# Patient Record
Sex: FEMALE | Race: WHITE | NOT HISPANIC OR LATINO | Employment: OTHER | URBAN - METROPOLITAN AREA
[De-identification: names, ages, dates, MRNs, and addresses within clinical notes are randomized per-mention and may not be internally consistent; named-entity substitution may affect disease eponyms.]

---

## 2017-01-04 ENCOUNTER — ONCOLOGY VISIT (OUTPATIENT)
Dept: ONCOLOGY | Facility: CLINIC | Age: 72
End: 2017-01-04
Attending: INTERNAL MEDICINE
Payer: COMMERCIAL

## 2017-01-04 ENCOUNTER — INFUSION THERAPY VISIT (OUTPATIENT)
Dept: INFUSION THERAPY | Facility: CLINIC | Age: 72
End: 2017-01-04
Attending: INTERNAL MEDICINE
Payer: COMMERCIAL

## 2017-01-04 ENCOUNTER — HOSPITAL ENCOUNTER (OUTPATIENT)
Facility: CLINIC | Age: 72
Setting detail: SPECIMEN
Discharge: HOME | End: 2017-01-04
Attending: INTERNAL MEDICINE | Admitting: INTERNAL MEDICINE
Payer: COMMERCIAL

## 2017-01-04 VITALS
TEMPERATURE: 98.2 F | RESPIRATION RATE: 17 BRPM | HEART RATE: 117 BPM | WEIGHT: 137.8 LBS | DIASTOLIC BLOOD PRESSURE: 76 MMHG | BODY MASS INDEX: 26.08 KG/M2 | SYSTOLIC BLOOD PRESSURE: 108 MMHG | OXYGEN SATURATION: 100 %

## 2017-01-04 DIAGNOSIS — D61.818 PANCYTOPENIA (H): ICD-10-CM

## 2017-01-04 DIAGNOSIS — D61.818 PANCYTOPENIA (H): Primary | ICD-10-CM

## 2017-01-04 LAB
ABO + RH BLD: NORMAL
BASOPHILS # BLD AUTO: 0 10E9/L (ref 0–0.2)
BASOPHILS NFR BLD AUTO: 0 %
BLD GP AB SCN SERPL QL: NORMAL
BLD GP AB SCN SERPL QL: NORMAL
BLD PROD TYP BPU: NORMAL
BLOOD BANK CMNT PATIENT-IMP: NORMAL
BLOOD BANK CMNT PATIENT-IMP: NORMAL
CELL TRANSPLANT TYPE: NORMAL
DIFFERENTIAL METHOD BLD: ABNORMAL
EOSINOPHIL # BLD AUTO: 0 10E9/L (ref 0–0.7)
EOSINOPHIL NFR BLD AUTO: 1.4 %
ERYTHROCYTE [DISTWIDTH] IN BLOOD BY AUTOMATED COUNT: 19 % (ref 10–15)
HCT VFR BLD AUTO: 25.7 % (ref 35–47)
HGB BLD-MCNC: 8.7 G/DL (ref 11.7–15.7)
IMM GRANULOCYTES # BLD: 0 10E9/L (ref 0–0.4)
IMM GRANULOCYTES NFR BLD: 0 %
LYMPHOCYTES # BLD AUTO: 1.8 10E9/L (ref 0.8–5.3)
LYMPHOCYTES NFR BLD AUTO: 83.3 %
MCH RBC QN AUTO: 31.4 PG (ref 26.5–33)
MCHC RBC AUTO-ENTMCNC: 33.9 G/DL (ref 31.5–36.5)
MCV RBC AUTO: 93 FL (ref 78–100)
MONOCYTES # BLD AUTO: 0.1 10E9/L (ref 0–1.3)
MONOCYTES NFR BLD AUTO: 3.3 %
NEUTROPHILS # BLD AUTO: 0.3 10E9/L (ref 1.6–8.3)
NEUTROPHILS NFR BLD AUTO: 12 %
NRBC # BLD AUTO: 0 10*3/UL
NRBC BLD AUTO-RTO: 0 /100
NUM BPU REQUESTED: 1
PLATELET # BLD AUTO: 6 10E9/L (ref 150–450)
RBC # BLD AUTO: 2.77 10E12/L (ref 3.8–5.2)
SPECIMEN EXP DATE BLD: NORMAL
SPECIMEN EXP DATE BLD: NORMAL
WBC # BLD AUTO: 2.1 10E9/L (ref 4–11)

## 2017-01-04 PROCEDURE — 86901 BLOOD TYPING SEROLOGIC RH(D): CPT | Performed by: INTERNAL MEDICINE

## 2017-01-04 PROCEDURE — 86850 RBC ANTIBODY SCREEN: CPT | Performed by: INTERNAL MEDICINE

## 2017-01-04 PROCEDURE — 85025 COMPLETE CBC W/AUTO DIFF WBC: CPT | Performed by: INTERNAL MEDICINE

## 2017-01-04 PROCEDURE — 36415 COLL VENOUS BLD VENIPUNCTURE: CPT

## 2017-01-04 PROCEDURE — 86900 BLOOD TYPING SEROLOGIC ABO: CPT | Performed by: INTERNAL MEDICINE

## 2017-01-04 PROCEDURE — 99214 OFFICE O/P EST MOD 30 MIN: CPT | Performed by: INTERNAL MEDICINE

## 2017-01-04 PROCEDURE — 99211 OFF/OP EST MAY X REQ PHY/QHP: CPT

## 2017-01-04 ASSESSMENT — PAIN SCALES - GENERAL: PAINLEVEL: NO PAIN (0)

## 2017-01-04 NOTE — PROGRESS NOTES
[facility] PHYSICIANS  HEMATOLOGY ONCOLOGY    HEMATOLOGY FOLLOWUP NOTE      DIAGNOSES:  Pancytopenia with moderate neutropenia, macrocytic anemia and severe thrombocytopenia.  S/P bone marrow biopsy 9/26/16 5-10% cellularity, no significant evidence of myelodysplasia. Normal flow-cytometry. Cytogenetics normal. Small clone of Red cells with CD 59 deficiency on Flow-cytometry. Working diagnosis is severe aplastic anemia vs MDS.  Repeat bone marrow biopsy 11/21/16 showed similar findings.      TREATMENT: patient to start  ATG, cyclosporine and eltrombopag     SUBJECTIVE:  The patient was seen as a followup today. She continues to have fatigued. No fever. No bleeding complications.     REVIEW OF SYSTEMS:  A complete review of systems was performed and found to be negative other than pertinent positives mentioned in history of present illness.     Past medical, social histories reviewed.    Meds- Reviewed.     PHYSICAL EXAMINATION:   VITAL SIGNS: /76 mmHg  Pulse 117  Temp(Src) 98.2  F (36.8  C) (Oral)  Resp 17  Wt 62.506 kg (137 lb 12.8 oz)  SpO2 100%  GENERAL: Sitting comfortably.   HEENT: Pupils are equal. Oropharynx is clear.   NECK: No cervical or supraclavicular lymphadenopathy.   LUNGS: Clear bilaterally.   HEART: S1, S2, regular.   ABDOMEN: Soft, nontender, nondistended, no hepatosplenomegaly.   EXTREMITIES: Warm, well perfused.   NEUROLOGIC: Alert, awake.   SKIN: No rash.   LYMPHATICS: No edema.     LABORATORY DATA:   Recent Labs   Lab Test  09/14/16   1345  09/08/16   0914   NA  140  141   POTASSIUM  3.8  4.2   CHLORIDE  108  110*   CO2  26  23   ANIONGAP  6  8   BUN  23  24   CR  0.95  0.91   GLC  132*  100*   LEO  8.7  9.1     Recent Labs   Lab Test  11/21/16   0945  11/16/16   1314  11/02/16   0845   WBC  1.9*  2.8*  2.5*   HGB  7.8*  6.9*  6.7*   PLT  9*  10*  18*   MCV  99  102*  112*   NEUTROPHIL  16.1  16.2  17.0     Recent Labs   Lab Test  09/14/16   1345  02/20/14   1049   01/30/12   1032   BILITOTAL  0.4  0.5  0.4   ALKPHOS  86  61  78   ALT  28  28  33   AST  17  27  32   ALBUMIN  3.6  3.9  4.2   LDH  195   --    --      ECOG PS: 1    ASSESSMENT:  Bonny Raymundo is a 70-year-old lady who presented with new onset dyspnea.  She has previous history of recurrent provoked venous thromboembolism and initial workup ruled out a venous thromboembolism.  Blood work was consistent with pancytopenia and she was seen by me on 09/14/2016 for initial evaluation.  Initial work up showed normal creatinine, normal LFTs, elevated erythropoietin, elevated ferritin, low iron of iron deficiency, normal B12 level.  She had a negative Salvador test, normal haptoglobin, serum protein electrophoresis was normal as well without any evidence of monoclonal gammopathy.  Abdominal ultrasound was also performed and there was no evidence of hepatosplenomegaly.   On 09/08/2016 she had a CT scan of the chest which did not show any evidence of pulmonary embolism.  There was a right-sided lung nodule which will need a followup CT scan.   PNH testing showed a small number of red cells which were CD-59 deficient.   She has seen Dr. Rafita Rogel at [facility].   parvovirus, CMV antibody, EBV antibody, aresenic level unremarkable. Repeat bone marrow biopsy 11/21/16 is consistent with severe AA..  - Transfusion parameters- Hb of 7 and platelet of 10K. Use leukocyte reduced irradiated products. Labs every two weeks.    She saw Dr. Rogel 12/8 and a discussion around ATG, cyclosporine and eltrombopag. I encouraged her to consider treatment, she agrees. She will follow up with [facility].     PLAN:   1. CBC diff every weeks- transfuse to keep Hb of 7 and platelet of 10K. Use leukocyte reduced irradiated products.   2- See Dr. Rogel 1/5  3- Continue prophylactic antibiotic    ANTONIA DHILLON MD    1/4/2017    cc: David Hicks NP

## 2017-01-04 NOTE — PROGRESS NOTES
"Bonny Raymundo is a 71 year old female who presents for:  Chief Complaint   Patient presents with     Oncology Clinic Visit     Ret Pancytopenia        Initial Vitals:  /76 mmHg  Pulse 117  Temp(Src) 98.2  F (36.8  C) (Oral)  Resp 17  Wt 62.506 kg (137 lb 12.8 oz)  SpO2 100% Estimated body mass index is 26.08 kg/(m^2) as calculated from the following:    Height as of 12/29/16: 1.548 m (5' 0.95\").    Weight as of this encounter: 62.506 kg (137 lb 12.8 oz).. There is no height on file to calculate BSA. BP completed using cuff size: regular  No Pain (0) No LMP recorded. Patient has had a hysterectomy. Allergies and medications reviewed.     Medications: Medication refills not needed today.  Pharmacy name entered into [employer]: [address]    Comments: None    6 minutes for nursing intake (face to face time)   Jennifer Garsia CMA    DISCHARGE PLAN:  Next appointments: See patient instruction section  Departure Mode: Ambulatory  Accompanied by: Self  4 minutes for nursing discharge (face to face time)   Jennie Monahan RN    1. CBC diff every week- transfuse to keep Hb of 7 and platelet of 10K. Use leukocyte reduced irradiated products. --encouraged Pt to schedule her wkly labs/poss trsfn appts but Pt wanted to wait until after her  appt.  2- See Dr. Rogel 1/5  3- Continue prophylactic antibiotic      1/5/2017 Thu  10:00 A(Ar*) 240 [facility] [[id number]] [facility] CHAIR 6 [[id number]] LEVEL 4 [666] transfuse 1 unit platelets       1/5/2017 Thu  2:00 PM  2:00 P(Ar*) 30 UCBMT [[id number]] RYANN ROGEL [[id number]] [facility] ONC RETURN [[id number]] APLASTIC ANEMIA/ALEJO REF pt request shelton scheduled in phone rm         "

## 2017-01-04 NOTE — MR AVS SNAPSHOT
After Visit Summary   1/4/2017    Bonny Raymundo    MRN: [id number]           Patient Information     Date Of Birth          1945        Visit Information        Provider Department      1/4/2017 2:45 PM Manpreet Mcdaniel MD [facility]        Care Instructions    1. CBC diff every week- transfuse to keep Hb of 7 and platelet of 10K. Use leukocyte reduced irradiated products.   2- See Dr. Rogel 1/5  3- Continue prophylactic antibiotic        Follow-ups after your visit        Your next 10 appointments already scheduled     Jan 05, 2017 10:00 AM   Level 4 with SOUTH CHAIR 6   [facility] ([facility])    [facility]  [address]  [facility] [id number] [phone]            Jan 05, 2017  2:00 PM   RETURN ONC with Rafita Rogel MD   [facility] Blood [facility] Marrow Transplant ([facility])    [address] [facility]  [address] [facility] [address] [phone]              Who to contact     If you have questions or need follow up information about today's clinic visit or your schedule please contact [facility] directly at [phone].  Normal or non-critical lab and imaging results will be communicated to you by [employer], letter or phone within 4 business days after the clinic has received the results. If you do not hear from us within 7 days, please contact the clinic through [employer] or phone. If you have a critical or abnormal lab result, we will notify you by phone as soon as possible.  Submit refill requests through [employer] or call your pharmacy and they will forward the refill request to us. Please allow 3 business days for your refill to be completed.          Additional Information About Your Visit        [employer] Information     [employer] lets you send messages to your doctor, view your test results, renew your prescriptions, schedule appointments and more.  "To sign up, go to [email or web address] . Click on \"Log in\" on the left side of the screen, which will take you to the Welcome page. Then click on \"Sign up Now\" on the right side of the page.     You will be asked to enter the access code listed below, as well as some personal information. Please follow the directions to create your username and password.     Your access code is: [email or web address]  Expires: 3/15/2017 11:47 AM     Your access code will  in 90 days. If you need help or a new code, please call your [facility] clinic or [phone].        Care EveryWhere ID     This is your Care EveryWhere ID. This could be used by other organizations to access your [facility] medical records  [id number]        Your Vitals Were     Pulse Temperature Respirations Pulse Oximetry          117 98.2  F (36.8  C) (Oral) 17 100%         Blood Pressure from Last 3 Encounters:   17 108/76   16 141/76   16 122/78    Weight from Last 3 Encounters:   17 62.506 kg (137 lb 12.8 oz)   16 64.003 kg (141 lb 1.6 oz)   16 61.508 kg (135 lb 9.6 oz)              Today, you had the following     No orders found for display       Primary Care Provider Office Phone # Fax #    Shivani Phelps PA-C [phone]       [facility] [address] [address]        Thank you!     Thank you for choosing [facility]  for your care. Our goal is always to provide you with excellent care. Hearing back from our patients is one way we can continue to improve our services. Please take a few minutes to complete the written survey that you may receive in the mail after your visit with us. Thank you!             Your Updated Medication List - Protect others around you: Learn how to safely use, store and throw away your medicines at www.disposemymeds.org.          This list is accurate as of: 17  3:27 PM.  Always use your most recent med list.                   Brand " Name Dispense Instructions for use    BENADRYL ALLERGY PO      Take 25 mg by mouth At Bedtime       calcium-vitamin D 600-400 MG-UNIT per tablet    CALTRATE     Take 1 tablet by mouth daily.       chlorpheniramine 4 MG tablet    CHLOR-TRIMETON     Take 4 mg by mouth every 6 hours as needed. For head cold       COMPRESSION STOCKINGS     2 each    Wear compression stockings at 20-30 mmHg rating most time during the day to the affected leg (left leg) or both legs. Take them off at night.       diclofenac 1 % Gel topical gel    VOLTAREN    100 g    Apply 2 g topically 2 times daily       fluconazole 200 MG tablet    DIFLUCAN    30 tablet    Take 1 tablet (200 mg) by mouth daily       levofloxacin 500 MG tablet    LEVAQUIN    30 tablet    Take 1 tablet (500 mg) by mouth daily       loratadine 10 MG tablet    CLARITIN     Take 10 mg by mouth daily as needed. For head cold       MULTIVITAMIN TABS   OR      1 tablet daily       order for DME     1 Box    Equipment being ordered: knee high compression stockings- 18-20 mm       TYLENOL PO      Take 325 mg by mouth every 6 hours as needed for mild pain or fever

## 2017-01-04 NOTE — PROGRESS NOTES
Medical Assistant Note:    Bonny Raymundo presents today for labs.    Patient seen by provider today: Yes: Jonas  Concerns: No Concerns    Labs drawn:  See orders   Peripheral RAC 21 gauge, green butterfly needle type. Gauze&coban applied.    Post Assessment:  Labs drawn without difficulty: Yes.    Discharge Plan:  Face to Face time: 8min.      Estefani Shankar MA

## 2017-01-04 NOTE — PATIENT INSTRUCTIONS
1. CBC diff every week- transfuse to keep Hb of 7 and platelet of 10K. Use leukocyte reduced irradiated products.   2- See Dr. Rogel 1/5  3- Continue prophylactic antibiotic

## 2017-01-05 ENCOUNTER — OFFICE VISIT (OUTPATIENT)
Dept: TRANSPLANT | Facility: CLINIC | Age: 72
End: 2017-01-05
Attending: INTERNAL MEDICINE
Payer: COMMERCIAL

## 2017-01-05 ENCOUNTER — INFUSION THERAPY VISIT (OUTPATIENT)
Dept: INFUSION THERAPY | Facility: CLINIC | Age: 72
End: 2017-01-05
Attending: INTERNAL MEDICINE
Payer: COMMERCIAL

## 2017-01-05 ENCOUNTER — HOSPITAL ENCOUNTER (OUTPATIENT)
Facility: CLINIC | Age: 72
Setting detail: SPECIMEN
End: 2017-01-05
Attending: INTERNAL MEDICINE

## 2017-01-05 VITALS
RESPIRATION RATE: 16 BRPM | SYSTOLIC BLOOD PRESSURE: 131 MMHG | DIASTOLIC BLOOD PRESSURE: 65 MMHG | HEART RATE: 75 BPM | TEMPERATURE: 97.9 F

## 2017-01-05 DIAGNOSIS — D61.818 PANCYTOPENIA (H): ICD-10-CM

## 2017-01-05 DIAGNOSIS — D61.818 PANCYTOPENIA (H): Primary | ICD-10-CM

## 2017-01-05 LAB
BLD PROD TYP BPU: NORMAL
BLD UNIT ID BPU: 0
BLOOD PRODUCT CODE: NORMAL
BPU ID: NORMAL
TRANSFUSION STATUS PATIENT QL: NORMAL
TRANSFUSION STATUS PATIENT QL: NORMAL

## 2017-01-05 PROCEDURE — P9037 PLATE PHERES LEUKOREDU IRRAD: HCPCS | Performed by: INTERNAL MEDICINE

## 2017-01-05 PROCEDURE — 99212 OFFICE O/P EST SF 10 MIN: CPT | Mod: 25

## 2017-01-05 PROCEDURE — 36430 TRANSFUSION BLD/BLD COMPNT: CPT

## 2017-01-05 PROCEDURE — 99212 OFFICE O/P EST SF 10 MIN: CPT | Mod: ZF

## 2017-01-05 RX ORDER — LIDOCAINE 40 MG/G
CREAM TOPICAL
Status: DISCONTINUED | OUTPATIENT
Start: 2017-01-05 | End: 2017-01-05 | Stop reason: HOSPADM

## 2017-01-05 RX ORDER — HEPARIN SODIUM,PORCINE 10 UNIT/ML
2-5 VIAL (ML) INTRAVENOUS
Status: DISCONTINUED | OUTPATIENT
Start: 2017-01-05 | End: 2017-01-05 | Stop reason: HOSPADM

## 2017-01-05 ASSESSMENT — PAIN SCALES - GENERAL: PAINLEVEL: NO PAIN (0)

## 2017-01-05 NOTE — PROGRESS NOTES
Infusion Nursing Note:  Bonny Raymundo presents today for 1 dose of Platelets.    Patient seen by provider today: No    Note: N/A.    Intravenous Access:  Peripheral IV placed.    Treatment Conditions:  HGB      8.7   1/4/2017  WBC      2.1   1/4/2017   ANEU      0.3   1/4/2017  PLT        6   1/4/2017     Results reviewed, labs MET treatment parameters, ok to proceed with treatment; Plts 6.  Blood consent signed 10/6/16.     Post Infusion Assessment:  Patient tolerated infusion without incident.  Blood return noted pre and post infusion.  Site patent and intact, free from redness, edema or discomfort.  No evidence of extravasations.  Access discontinued per protocol.    Discharge Plan:   AVS to patient via MYCHART.  Patient will return prn for next appointment.   Patient discharged in stable condition accompanied by: self.  Departure Mode: Ambulatory.    ANABELLE Jones RN (discharge)

## 2017-01-05 NOTE — NURSING NOTE
"Bonny Raymundo is a 71 year old female who presents for:  Chief Complaint   Patient presents with     Oncology Clinic Visit     Return: APLASTIC ANEMIA        Initial Vitals:  There were no vitals taken for this visit. Estimated body mass index is 26.08 kg/(m^2) as calculated from the following:    Height as of 12/29/16: 1.548 m (5' 0.95\").    Weight as of 1/4/17: 62.506 kg (137 lb 12.8 oz).. There is no height or weight on file to calculate BSA. BP completed using cuff size: regular  Data Unavailable No LMP recorded. Patient has had a hysterectomy. Allergies and medications reviewed.     Medications: Medication refills not needed today.  Pharmacy name entered into [facility]: [facility] [place] [address], [address]    Comments:     6 minutes for nursing intake (face to face time)   ANTHONY SPRINGER CMA          "

## 2017-01-05 NOTE — PROGRESS NOTES
Wt Readings from Last 4 Encounters:   01/04/17 62.506 kg (137 lb 12.8 oz)   12/29/16 64.003 kg (141 lb 1.6 oz)   12/08/16 61.508 kg (135 lb 9.6 oz)   11/30/16 62.143 kg (137 lb)     Vital Signs 1/5/2017 1/5/2017   Systolic 117 131   Diastolic 72 65   Pulse 72 75   Temperature     98 97.9   Respirations 16                               HISTORY OF PRESENT ILLNESS:  Bonny returns to follow up her severe aplastic anemia.  Over the last month, she has continued to have pancytopenia. Her neutrophils have been under 500 and platelets under 10,000 for most of the last 6 weeks. Her reticulocyte count is lower as well.  Her marrow in late November showed no new diagnostic or improved findings from the earlier one at diagnosis.    She has had no major infections or bleeding but has easy bruising and there is more cutaneous hemorrhage around her venapuncture sites.  She has had transfusions of red cells and platelets almost every week but thankfully has had no notable infections.  She has trace nosebleeds and petechiae but no other external bleeding.  She has some low back pain which has been treated by her primary care physician with topical diclofenac, which has helped a bit. She knows to avoid aspirin and systemic nonsteroidals.  She is eating reasonably well but has lost a bit of weight, even accounting for heavier clothes on this cold day.  There are no respiratory, cardiac, GI or  symptoms.  She is mildly constipated.      PHYSICAL EXAMINATION:   VITAL SIGNS:  Her exam shows acceptable vital signs.   LYMPH:  She has no peripheral lymphadenopathy.   EXTREMITIES:  She has no peripheral edema.   SKIN:  She has some petechiae in her lower extremities and her arms but they are not fresh.  She has a few slightly palpable bruises on her arms and her right lower shin laterally.   HEENT:  Her oropharynx is clear without mucosal lesions.  She has no conjunctival hemorrhage.   LUNGS:  Clear.   CARDIOVASCULAR:  Heart tones are  regular with a soft ejection murmur.   ABDOMEN:  Soft and nontender without hepatosplenomegaly or masses.      LABORATORY TESTING:  Laboratory testing  shows persisting pancytopenia. A recent INR was normal.     ASSESSMENT AND PLAN:  As we discussed on previous occasions, now her idiopathic aplastic anemia and pancytopenia is severe enough to be treated with ATG, cyclosporine, corticosteroids and eltrombopag.  I reviewed this plan with her last month and she agrees that it is time to get started.      She elects to be admitted on Monday 01/09.  Orders were placed for admission, placement of PICC line and then beginning the ATG therapy.      I reviewed the toxicities of ATG including fever, chills and transient increases in her red cell and platelet transfusion requirement, risks of infection, risk of bleeding.  We reviewed cyclosporine with the risks of hypertension, renal insufficiency, occasionally hyperglycemia, neurologic tremor and possibly even seizures or PRES.  We reviewed the hazards of corticosteroids and the other supportive care medicines accompany the ATG and the eltrombopag.  I told her that she would likely be hospitalized at least 5-7 days, but we will watch her closely and then she would need twice weekly minimum followup for blood counts, transfusion support and adjustment of her cyclosporine therapy afterwards.      If possible, we will arrange for outpatient care to be followed with Dr. Mcdaniel at [facility] as that is closer to her home, but we will accommodate what her needs are based on response to the ATG therapy.      Her questions were answered in full, and we will arrange admission for 01/09.      Rafita Rogel MD   Professor of Medicine     Extended; 50 minute visit.    Results for LISSETH PARIKH (MRN [id number]) as of 1/5/2017 15:00   Ref. Range 12/29/2016 08:22 12/29/2016 09:01 1/4/2017 15:00 1/4/2017 15:00 1/4/2017 20:43   Sodium Latest Ref Range: 133-144 mmol/L 143       Potassium  Latest Ref Range: 3.4-5.3 mmol/L 3.5       Chloride Latest Ref Range:  mmol/L 110 (H)       Carbon Dioxide Latest Ref Range: 20-32 mmol/L 24       Urea Nitrogen Latest Ref Range: 7-30 mg/dL 26       Creatinine Latest Ref Range: 0.52-1.04 mg/dL 0.88       GFR Estimate Latest Ref Range: >60 mL/min/1.7m2 63       GFR Estimate If Black Latest Ref Range: >60 mL/min/1.7m2 76       Calcium Latest Ref Range: 8.5-10.1 mg/dL 9.0       Anion Gap Latest Ref Range: 3-14 mmol/L 9       Albumin Latest Ref Range: 3.4-5.0 g/dL 3.4       Protein Total Latest Ref Range: 6.8-8.8 g/dL 7.1       Bilirubin Total Latest Ref Range: 0.2-1.3 mg/dL 0.5       Alkaline Phosphatase Latest Ref Range:  U/L 101       ALT Latest Ref Range: 0-50 U/L 21       AST Latest Ref Range: 0-45 U/L 19       Glucose Latest Ref Range: 70-99 mg/dL 111 (H)       WBC Latest Ref Range: 4.0-11.0 10e9/L 2.1 (L)  2.1 (L)     Hemoglobin Latest Ref Range: 11.7-15.7 g/dL 7.8 (L)  8.7 (L)     Hematocrit Latest Ref Range: 35.0-47.0 % 22.8 (L)  25.7 (L)     Platelet Count Latest Ref Range: 150-450 10e9/L 8 (LL)  6 (LL)     RBC Count Latest Ref Range: 3.8-5.2 10e12/L 2.45 (L)  2.77 (L)     MCV Latest Ref Range:  fl 93  93     MCH Latest Ref Range: 26.5-33.0 pg 31.8  31.4     MCHC Latest Ref Range: 31.5-36.5 g/dL 34.2  33.9     RDW Latest Ref Range: 10.0-15.0 % 21.8 (H)  19.0 (H)     Diff Method Unknown Automated Method  Automated Method     % Neutrophils Latest Units: % 10.7  12.0     % Lymphocytes Latest Units: % 86.3  83.3     % Monocytes Latest Units: % 1.5  3.3     % Eosinophils Latest Units: % 1.5  1.4     % Basophils Latest Units: % 0.0  0.0     % Immature Granulocytes Latest Units: % 0.0  0.0     Nucleated RBCs Latest Ref Range: 0 /100 0  0     Absolute Neutrophil Latest Ref Range: 1.6-8.3 10e9/L 0.2 (LL)  0.3 (LL)     Absolute Lymphocytes Latest Ref Range: 0.8-5.3 10e9/L 1.8  1.8     Absolute Monocytes Latest Ref Range: 0.0-1.3 10e9/L 0.0  0.1      Absolute Eosinophils Latest Ref Range: 0.0-0.7 10e9/L 0.0  0.0     Absolute Basophils Latest Ref Range: 0.0-0.2 10e9/L 0.0  0.0     Abs Immature Granulocytes Latest Ref Range: 0-0.4 10e9/L 0.0  0.0     Absolute Nucleated RBC Unknown 0.0  0.0     % Retic Latest Ref Range: 0.5-2.0 % 0.7       Absolute Retic Latest Ref Range: 25-95 10e9/L 15.9 (L)       INR Latest Ref Range: 0.86-1.14  1.06       ABO Unknown A  Blood Bank Cancel... A    RH(D) Unknown Pos  Blood Bank Cancel... Pos    Antibody Screen Unknown Neg  Canceled, Test cr... Neg    Test Valid Only At [facility]...  [facility]... [facility]...    Specimen Expires Unknown 01/01/2017 01/07/2017 01/07/2017    Ordered Component Type Unknown  PLT Pheresis   PLT Pheresis   Blood Component Type Unknown Red Blood Cells L... PlateletPheresis ...   PlateletPheresis,...   Unit Number Unknown [id number]   [id number]   Division Number Unknown 00 00   00   Status of Unit Unknown Released to care ... Released to care ...   Released to care ...   Cell Transplant Type Unknown    GROUP O CELLS DET...    Crossmatch Unknown Red Blood Cells       Unit Status Unknown ISS ISS   ISS

## 2017-01-05 NOTE — Clinical Note
1/5/2017      RE: Bonny Raymundo  [address]  [facility] [address]         Wt Readings from Last 4 Encounters:   01/04/17 62.506 kg (137 lb 12.8 oz)   12/29/16 64.003 kg (141 lb 1.6 oz)   12/08/16 61.508 kg (135 lb 9.6 oz)   11/30/16 62.143 kg (137 lb)     Vital Signs 1/5/2017 1/5/2017   Systolic 117 131   Diastolic 72 65   Pulse 72 75   Temperature     98 97.9   Respirations 16                               HISTORY OF PRESENT ILLNESS:  Bonny returns to follow up her severe aplastic anemia.  Over the last month, she has continued to have pancytopenia. Her neutrophils have been under 500 and platelets under 10,000 for most of the last 6 weeks. Her reticulocyte count is lower as well.  Her marrow in late November showed no new diagnostic or improved findings from the earlier one at diagnosis.    She has had no major infections or bleeding but has easy bruising and there is more cutaneous hemorrhage around her venapuncture sites.  She has had transfusions of red cells and platelets almost every week but thankfully has had no notable infections.  She has trace nosebleeds and petechiae but no other external bleeding.  She has some low back pain which has been treated by her primary care physician with topical diclofenac, which has helped a bit. She knows to avoid aspirin and systemic nonsteroidals.  She is eating reasonably well but has lost a bit of weight, even accounting for heavier clothes on this cold day.  There are no respiratory, cardiac, GI or  symptoms.  She is mildly constipated.      PHYSICAL EXAMINATION:   VITAL SIGNS:  Her exam shows acceptable vital signs.   LYMPH:  She has no peripheral lymphadenopathy.   EXTREMITIES:  She has no peripheral edema.   SKIN:  She has some petechiae in her lower extremities and her arms but they are not fresh.  She has a few slightly palpable bruises on her arms and her right lower shin laterally.   HEENT:  Her oropharynx is clear without mucosal lesions.  She  has no conjunctival hemorrhage.   LUNGS:  Clear.   CARDIOVASCULAR:  Heart tones are regular with a soft ejection murmur.   ABDOMEN:  Soft and nontender without hepatosplenomegaly or masses.      LABORATORY TESTING:  Laboratory testing  shows persisting pancytopenia. A recent INR was normal.     ASSESSMENT AND PLAN:  As we discussed on previous occasions, now her idiopathic aplastic anemia and pancytopenia is severe enough to be treated with ATG, cyclosporine, corticosteroids and eltrombopag.  I reviewed this plan with her last month and she agrees that it is time to get started.      She elects to be admitted on Monday 01/09.  Orders were placed for admission, placement of PICC line and then beginning the ATG therapy.      I reviewed the toxicities of ATG including fever, chills and transient increases in her red cell and platelet transfusion requirement, risks of infection, risk of bleeding.  We reviewed cyclosporine with the risks of hypertension, renal insufficiency, occasionally hyperglycemia, neurologic tremor and possibly even seizures or PRES.  We reviewed the hazards of corticosteroids and the other supportive care medicines accompany the ATG and the eltrombopag.  I told her that she would likely be hospitalized at least 5-7 days, but we will watch her closely and then she would need twice weekly minimum followup for blood counts, transfusion support and adjustment of her cyclosporine therapy afterwards.      If possible, we will arrange for outpatient care to be followed with Dr. Mcdaniel at [facility] as that is closer to her home, but we will accommodate what her needs are based on response to the ATG therapy.      Her questions were answered in full, and we will arrange admission for 01/09.      Rafita Rogel MD   Professor of Medicine     Extended; 50 minute visit.    Results for LISSETH PARIKH (MRN [id number]) as of 1/5/2017 15:00   Ref. Range 12/29/2016 08:22 12/29/2016 09:01 1/4/2017 15:00 1/4/2017  15:00 1/4/2017 20:43   Sodium Latest Ref Range: 133-144 mmol/L 143       Potassium Latest Ref Range: 3.4-5.3 mmol/L 3.5       Chloride Latest Ref Range:  mmol/L 110 (H)       Carbon Dioxide Latest Ref Range: 20-32 mmol/L 24       Urea Nitrogen Latest Ref Range: 7-30 mg/dL 26       Creatinine Latest Ref Range: 0.52-1.04 mg/dL 0.88       GFR Estimate Latest Ref Range: >60 mL/min/1.7m2 63       GFR Estimate If Black Latest Ref Range: >60 mL/min/1.7m2 76       Calcium Latest Ref Range: 8.5-10.1 mg/dL 9.0       Anion Gap Latest Ref Range: 3-14 mmol/L 9       Albumin Latest Ref Range: 3.4-5.0 g/dL 3.4       Protein Total Latest Ref Range: 6.8-8.8 g/dL 7.1       Bilirubin Total Latest Ref Range: 0.2-1.3 mg/dL 0.5       Alkaline Phosphatase Latest Ref Range:  U/L 101       ALT Latest Ref Range: 0-50 U/L 21       AST Latest Ref Range: 0-45 U/L 19       Glucose Latest Ref Range: 70-99 mg/dL 111 (H)       WBC Latest Ref Range: 4.0-11.0 10e9/L 2.1 (L)  2.1 (L)     Hemoglobin Latest Ref Range: 11.7-15.7 g/dL 7.8 (L)  8.7 (L)     Hematocrit Latest Ref Range: 35.0-47.0 % 22.8 (L)  25.7 (L)     Platelet Count Latest Ref Range: 150-450 10e9/L 8 (LL)  6 (LL)     RBC Count Latest Ref Range: 3.8-5.2 10e12/L 2.45 (L)  2.77 (L)     MCV Latest Ref Range:  fl 93  93     MCH Latest Ref Range: 26.5-33.0 pg 31.8  31.4     MCHC Latest Ref Range: 31.5-36.5 g/dL 34.2  33.9     RDW Latest Ref Range: 10.0-15.0 % 21.8 (H)  19.0 (H)     Diff Method Unknown Automated Method  Automated Method     % Neutrophils Latest Units: % 10.7  12.0     % Lymphocytes Latest Units: % 86.3  83.3     % Monocytes Latest Units: % 1.5  3.3     % Eosinophils Latest Units: % 1.5  1.4     % Basophils Latest Units: % 0.0  0.0     % Immature Granulocytes Latest Units: % 0.0  0.0     Nucleated RBCs Latest Ref Range: 0 /100 0  0     Absolute Neutrophil Latest Ref Range: 1.6-8.3 10e9/L 0.2 (LL)  0.3 (LL)     Absolute Lymphocytes Latest Ref Range: 0.8-5.3  10e9/L 1.8  1.8     Absolute Monocytes Latest Ref Range: 0.0-1.3 10e9/L 0.0  0.1     Absolute Eosinophils Latest Ref Range: 0.0-0.7 10e9/L 0.0  0.0     Absolute Basophils Latest Ref Range: 0.0-0.2 10e9/L 0.0  0.0     Abs Immature Granulocytes Latest Ref Range: 0-0.4 10e9/L 0.0  0.0     Absolute Nucleated RBC Unknown 0.0  0.0     % Retic Latest Ref Range: 0.5-2.0 % 0.7       Absolute Retic Latest Ref Range: 25-95 10e9/L 15.9 (L)       INR Latest Ref Range: 0.86-1.14  1.06       ABO Unknown A  Blood Bank Cancel... A    RH(D) Unknown Pos  Blood Bank Cancel... Pos    Antibody Screen Unknown Neg  Canceled, Test cr... Neg    Test Valid Only At Unknown [facility]...  [facility]... [facility]...    Specimen Expires Unknown 01/01/2017 01/07/2017 01/07/2017    Ordered Component Type Unknown  PLT Pheresis   PLT Pheresis   Blood Component Type Unknown Red Blood Cells L... PlateletPheresis ...   PlateletPheresis,...   Unit Number Unknown [id number]   [id number]   Division Number Unknown 00 00 00   Status of Unit Unknown Released to care ... Released to care ...   Released to care ...   Cell Transplant Type Unknown    GROUP O CELLS DET...    Crossmatch Unknown Red Blood Cells       Unit Status Unknown ISS ISS   ISS       Rafita Rogel MD

## 2017-01-06 DIAGNOSIS — D64.9 ANEMIA: Primary | ICD-10-CM

## 2017-01-09 ENCOUNTER — HOSPITAL ENCOUNTER (INPATIENT)
Facility: CLINIC | Age: 72
LOS: 4 days | Discharge: HOME HEALTH | DRG: 810 | End: 2017-01-13
Attending: INTERNAL MEDICINE | Admitting: INTERNAL MEDICINE
Payer: COMMERCIAL

## 2017-01-09 ENCOUNTER — APPOINTMENT (OUTPATIENT)
Dept: GENERAL RADIOLOGY | Facility: CLINIC | Age: 72
DRG: 810 | End: 2017-01-09
Attending: INTERNAL MEDICINE
Payer: COMMERCIAL

## 2017-01-09 ENCOUNTER — HOSPITAL ENCOUNTER (OUTPATIENT)
Dept: VASCULAR ULTRASOUND | Facility: CLINIC | Age: 72
DRG: 810 | End: 2017-01-09
Attending: INTERNAL MEDICINE
Payer: COMMERCIAL

## 2017-01-09 DIAGNOSIS — K21.9 GASTROESOPHAGEAL REFLUX DISEASE WITHOUT ESOPHAGITIS: ICD-10-CM

## 2017-01-09 DIAGNOSIS — D61.818 PANCYTOPENIA (H): ICD-10-CM

## 2017-01-09 DIAGNOSIS — I15.8 OTHER SECONDARY HYPERTENSION: ICD-10-CM

## 2017-01-09 DIAGNOSIS — D61.3 IDIOPATHIC APLASTIC ANEMIA (H): Primary | ICD-10-CM

## 2017-01-09 DIAGNOSIS — D64.9 ANEMIA: ICD-10-CM

## 2017-01-09 LAB
ALBUMIN SERPL-MCNC: 3.3 G/DL (ref 3.4–5)
ALBUMIN UR-MCNC: NEGATIVE MG/DL
ALP SERPL-CCNC: 122 U/L (ref 40–150)
ALT SERPL W P-5'-P-CCNC: 33 U/L (ref 0–50)
ANION GAP SERPL CALCULATED.3IONS-SCNC: 8 MMOL/L (ref 3–14)
APPEARANCE UR: CLEAR
AST SERPL W P-5'-P-CCNC: 18 U/L (ref 0–45)
BASOPHILS # BLD AUTO: 0 10E9/L (ref 0–0.2)
BASOPHILS NFR BLD AUTO: 0 %
BILIRUB SERPL-MCNC: 0.3 MG/DL (ref 0.2–1.3)
BILIRUB UR QL STRIP: NEGATIVE
BUN SERPL-MCNC: 29 MG/DL (ref 7–30)
CALCIUM SERPL-MCNC: 8.8 MG/DL (ref 8.5–10.1)
CHLORIDE SERPL-SCNC: 109 MMOL/L (ref 94–109)
CO2 SERPL-SCNC: 27 MMOL/L (ref 20–32)
COLOR UR AUTO: ABNORMAL
CREAT SERPL-MCNC: 0.95 MG/DL (ref 0.52–1.04)
DIFFERENTIAL METHOD BLD: ABNORMAL
EOSINOPHIL # BLD AUTO: 0 10E9/L (ref 0–0.7)
EOSINOPHIL NFR BLD AUTO: 0.4 %
ERYTHROCYTE [DISTWIDTH] IN BLOOD BY AUTOMATED COUNT: 19.3 % (ref 10–15)
GFR SERPL CREATININE-BSD FRML MDRD: 58 ML/MIN/1.7M2
GLUCOSE SERPL-MCNC: 109 MG/DL (ref 70–99)
GLUCOSE UR STRIP-MCNC: NEGATIVE MG/DL
HCT VFR BLD AUTO: 21.6 % (ref 35–47)
HGB BLD-MCNC: 7 G/DL (ref 11.7–15.7)
HGB UR QL STRIP: NEGATIVE
IMM GRANULOCYTES # BLD: 0 10E9/L (ref 0–0.4)
IMM GRANULOCYTES NFR BLD: 0 %
KETONES UR STRIP-MCNC: NEGATIVE MG/DL
LACTATE BLD-SCNC: 3.5 MMOL/L (ref 0.7–2.1)
LEUKOCYTE ESTERASE UR QL STRIP: NEGATIVE
LYMPHOCYTES # BLD AUTO: 2 10E9/L (ref 0.8–5.3)
LYMPHOCYTES NFR BLD AUTO: 86.7 %
MAGNESIUM SERPL-MCNC: 2.1 MG/DL (ref 1.6–2.3)
MCH RBC QN AUTO: 30.4 PG (ref 26.5–33)
MCHC RBC AUTO-ENTMCNC: 32.4 G/DL (ref 31.5–36.5)
MCV RBC AUTO: 94 FL (ref 78–100)
MONOCYTES # BLD AUTO: 0.1 10E9/L (ref 0–1.3)
MONOCYTES NFR BLD AUTO: 3 %
MUCOUS THREADS #/AREA URNS LPF: PRESENT /LPF
NEUTROPHILS # BLD AUTO: 0.2 10E9/L (ref 1.6–8.3)
NEUTROPHILS NFR BLD AUTO: 9.9 %
NITRATE UR QL: NEGATIVE
NRBC # BLD AUTO: 0 10*3/UL
NRBC BLD AUTO-RTO: 2 /100
PH UR STRIP: 5.5 PH (ref 5–7)
PHOSPHATE SERPL-MCNC: 3.6 MG/DL (ref 2.5–4.5)
PLATELET # BLD AUTO: 9 10E9/L (ref 150–450)
PLATELET # BLD EST: ABNORMAL 10*3/UL
POTASSIUM SERPL-SCNC: 3.8 MMOL/L (ref 3.4–5.3)
PROT SERPL-MCNC: 6.7 G/DL (ref 6.8–8.8)
RBC # BLD AUTO: 2.3 10E12/L (ref 3.8–5.2)
RBC #/AREA URNS AUTO: 0 /HPF (ref 0–2)
SODIUM SERPL-SCNC: 143 MMOL/L (ref 133–144)
SP GR UR STRIP: 1.01 (ref 1–1.03)
SQUAMOUS #/AREA URNS AUTO: <1 /HPF (ref 0–1)
URN SPEC COLLECT METH UR: ABNORMAL
UROBILINOGEN UR STRIP-MCNC: NORMAL MG/DL (ref 0–2)
WBC # BLD AUTO: 2.3 10E9/L (ref 4–11)
WBC #/AREA URNS AUTO: <1 /HPF (ref 0–2)

## 2017-01-09 PROCEDURE — 25000125 ZZHC RX 250: Performed by: INTERNAL MEDICINE

## 2017-01-09 PROCEDURE — 3E043WL INTRODUCTION OF IMMUNOSUPPRESSIVE INTO CENTRAL VEIN, PERCUTANEOUS: ICD-10-PCS | Performed by: INTERNAL MEDICINE

## 2017-01-09 PROCEDURE — 87040 BLOOD CULTURE FOR BACTERIA: CPT | Performed by: INTERNAL MEDICINE

## 2017-01-09 PROCEDURE — 36592 COLLECT BLOOD FROM PICC: CPT | Performed by: PHYSICIAN ASSISTANT

## 2017-01-09 PROCEDURE — 12000003 ZZH R&B CRITICAL UMMC

## 2017-01-09 PROCEDURE — 25000132 ZZH RX MED GY IP 250 OP 250 PS 637: Performed by: INTERNAL MEDICINE

## 2017-01-09 PROCEDURE — 25000128 H RX IP 250 OP 636: Performed by: INTERNAL MEDICINE

## 2017-01-09 PROCEDURE — 84100 ASSAY OF PHOSPHORUS: CPT | Performed by: PHYSICIAN ASSISTANT

## 2017-01-09 PROCEDURE — 83735 ASSAY OF MAGNESIUM: CPT | Performed by: PHYSICIAN ASSISTANT

## 2017-01-09 PROCEDURE — 36569 INSJ PICC 5 YR+ W/O IMAGING: CPT

## 2017-01-09 PROCEDURE — 81001 URINALYSIS AUTO W/SCOPE: CPT | Performed by: INTERNAL MEDICINE

## 2017-01-09 PROCEDURE — 80053 COMPREHEN METABOLIC PANEL: CPT | Performed by: PHYSICIAN ASSISTANT

## 2017-01-09 PROCEDURE — 83605 ASSAY OF LACTIC ACID: CPT | Performed by: INTERNAL MEDICINE

## 2017-01-09 PROCEDURE — 36592 COLLECT BLOOD FROM PICC: CPT | Performed by: INTERNAL MEDICINE

## 2017-01-09 PROCEDURE — 25000130 H RX MED GY IP 250 OP 259 PS 637: Performed by: INTERNAL MEDICINE

## 2017-01-09 PROCEDURE — 25000132 ZZH RX MED GY IP 250 OP 250 PS 637: Performed by: PHYSICIAN ASSISTANT

## 2017-01-09 PROCEDURE — 85025 COMPLETE CBC W/AUTO DIFF WBC: CPT | Performed by: PHYSICIAN ASSISTANT

## 2017-01-09 PROCEDURE — C1751 CATH, INF, PER/CENT/MIDLINE: HCPCS

## 2017-01-09 PROCEDURE — 71010 XR CHEST 1 VW: CPT

## 2017-01-09 PROCEDURE — 25000130 H RX MED GY IP 250 OP 259 PS 637: Performed by: PHYSICIAN ASSISTANT

## 2017-01-09 RX ORDER — PROCHLORPERAZINE MALEATE 5 MG
5 TABLET ORAL EVERY 6 HOURS PRN
Status: DISCONTINUED | OUTPATIENT
Start: 2017-01-09 | End: 2017-01-13 | Stop reason: HOSPADM

## 2017-01-09 RX ORDER — METHYLPREDNISOLONE SODIUM SUCCINATE 40 MG/ML
0.5 INJECTION, POWDER, LYOPHILIZED, FOR SOLUTION INTRAMUSCULAR; INTRAVENOUS EVERY 24 HOURS
Status: COMPLETED | OUTPATIENT
Start: 2017-01-10 | End: 2017-01-13

## 2017-01-09 RX ORDER — CYCLOSPORINE 100 MG/1
3 CAPSULE ORAL
Status: DISCONTINUED | OUTPATIENT
Start: 2017-01-09 | End: 2017-01-11

## 2017-01-09 RX ORDER — POTASSIUM CHLORIDE 29.8 MG/ML
20 INJECTION INTRAVENOUS
Status: DISCONTINUED | OUTPATIENT
Start: 2017-01-09 | End: 2017-01-13 | Stop reason: HOSPADM

## 2017-01-09 RX ORDER — ONDANSETRON 2 MG/ML
8 INJECTION INTRAMUSCULAR; INTRAVENOUS EVERY 8 HOURS PRN
Status: DISCONTINUED | OUTPATIENT
Start: 2017-01-09 | End: 2017-01-13 | Stop reason: HOSPADM

## 2017-01-09 RX ORDER — POTASSIUM CHLORIDE 1.5 G/1.58G
20-40 POWDER, FOR SOLUTION ORAL
Status: DISCONTINUED | OUTPATIENT
Start: 2017-01-09 | End: 2017-01-13 | Stop reason: HOSPADM

## 2017-01-09 RX ORDER — ONDANSETRON 8 MG/1
8 TABLET, ORALLY DISINTEGRATING ORAL EVERY 8 HOURS PRN
Status: DISCONTINUED | OUTPATIENT
Start: 2017-01-09 | End: 2017-01-13 | Stop reason: HOSPADM

## 2017-01-09 RX ORDER — ACETAMINOPHEN 325 MG/1
650 TABLET ORAL EVERY 24 HOURS
Status: COMPLETED | OUTPATIENT
Start: 2017-01-09 | End: 2017-01-12

## 2017-01-09 RX ORDER — ACETAMINOPHEN 325 MG/1
650 TABLET ORAL EVERY 4 HOURS PRN
Status: DISCONTINUED | OUTPATIENT
Start: 2017-01-09 | End: 2017-01-13 | Stop reason: HOSPADM

## 2017-01-09 RX ORDER — LORATADINE 10 MG/1
10 TABLET ORAL DAILY PRN
Status: DISCONTINUED | OUTPATIENT
Start: 2017-01-09 | End: 2017-01-13 | Stop reason: HOSPADM

## 2017-01-09 RX ORDER — POTASSIUM CHLORIDE 7.45 MG/ML
10 INJECTION INTRAVENOUS
Status: DISCONTINUED | OUTPATIENT
Start: 2017-01-09 | End: 2017-01-13 | Stop reason: HOSPADM

## 2017-01-09 RX ORDER — MAGNESIUM SULFATE HEPTAHYDRATE 40 MG/ML
4 INJECTION, SOLUTION INTRAVENOUS EVERY 4 HOURS PRN
Status: DISCONTINUED | OUTPATIENT
Start: 2017-01-09 | End: 2017-01-13 | Stop reason: HOSPADM

## 2017-01-09 RX ORDER — AMOXICILLIN 250 MG
1-2 CAPSULE ORAL 2 TIMES DAILY
Status: DISCONTINUED | OUTPATIENT
Start: 2017-01-09 | End: 2017-01-13 | Stop reason: HOSPADM

## 2017-01-09 RX ORDER — LORAZEPAM 2 MG/ML
.5-1 INJECTION INTRAMUSCULAR EVERY 6 HOURS PRN
Status: DISCONTINUED | OUTPATIENT
Start: 2017-01-09 | End: 2017-01-13 | Stop reason: HOSPADM

## 2017-01-09 RX ORDER — DIPHENHYDRAMINE HCL 25 MG
25 CAPSULE ORAL AT BEDTIME
Status: DISCONTINUED | OUTPATIENT
Start: 2017-01-09 | End: 2017-01-13 | Stop reason: HOSPADM

## 2017-01-09 RX ORDER — POTASSIUM CHLORIDE 750 MG/1
20-40 TABLET, EXTENDED RELEASE ORAL
Status: DISCONTINUED | OUTPATIENT
Start: 2017-01-09 | End: 2017-01-13 | Stop reason: HOSPADM

## 2017-01-09 RX ORDER — LIDOCAINE 40 MG/G
CREAM TOPICAL
Status: DISCONTINUED | OUTPATIENT
Start: 2017-01-09 | End: 2017-01-13 | Stop reason: HOSPADM

## 2017-01-09 RX ORDER — DIPHENHYDRAMINE HCL 25 MG
25 CAPSULE ORAL EVERY 24 HOURS
Status: COMPLETED | OUTPATIENT
Start: 2017-01-09 | End: 2017-01-12

## 2017-01-09 RX ORDER — LORAZEPAM 0.5 MG/1
.5-1 TABLET ORAL EVERY 6 HOURS PRN
Status: DISCONTINUED | OUTPATIENT
Start: 2017-01-09 | End: 2017-01-13 | Stop reason: HOSPADM

## 2017-01-09 RX ORDER — LEVOFLOXACIN 500 MG/1
500 TABLET, FILM COATED ORAL DAILY
Status: DISCONTINUED | OUTPATIENT
Start: 2017-01-09 | End: 2017-01-09

## 2017-01-09 RX ORDER — PANTOPRAZOLE SODIUM 40 MG/1
40 TABLET, DELAYED RELEASE ORAL EVERY MORNING
Status: DISCONTINUED | OUTPATIENT
Start: 2017-01-10 | End: 2017-01-13 | Stop reason: HOSPADM

## 2017-01-09 RX ORDER — POLYETHYLENE GLYCOL 3350 17 G/17G
17 POWDER, FOR SOLUTION ORAL DAILY PRN
Status: DISCONTINUED | OUTPATIENT
Start: 2017-01-09 | End: 2017-01-13 | Stop reason: HOSPADM

## 2017-01-09 RX ORDER — FLUCONAZOLE 200 MG/1
200 TABLET ORAL DAILY
Status: DISCONTINUED | OUTPATIENT
Start: 2017-01-09 | End: 2017-01-13 | Stop reason: HOSPADM

## 2017-01-09 RX ADMIN — FLUCONAZOLE 200 MG: 200 TABLET ORAL at 20:30

## 2017-01-09 RX ADMIN — METHYLPREDNISOLONE SODIUM SUCCINATE 31 MG: 40 INJECTION, POWDER, LYOPHILIZED, FOR SOLUTION INTRAMUSCULAR; INTRAVENOUS at 23:59

## 2017-01-09 RX ADMIN — METHYLPREDNISOLONE SODIUM SUCCINATE 2500 MG: 125 INJECTION, POWDER, LYOPHILIZED, FOR SOLUTION INTRAMUSCULAR; INTRAVENOUS at 14:50

## 2017-01-09 RX ADMIN — ACETAMINOPHEN 650 MG: 325 TABLET, FILM COATED ORAL at 14:07

## 2017-01-09 RX ADMIN — CEFEPIME 2 G: 2 INJECTION, POWDER, FOR SOLUTION INTRAMUSCULAR; INTRAVENOUS at 22:42

## 2017-01-09 RX ADMIN — ACETAMINOPHEN 650 MG: 325 TABLET, FILM COATED ORAL at 22:16

## 2017-01-09 RX ADMIN — CYCLOSPORINE 200 MG: 100 CAPSULE ORAL at 20:30

## 2017-01-09 RX ADMIN — ELTROMBOPAG OLAMINE 50 MG: 25 TABLET, FILM COATED ORAL at 14:04

## 2017-01-09 RX ADMIN — DIPHENHYDRAMINE HYDROCHLORIDE 25 MG: 25 CAPSULE ORAL at 20:30

## 2017-01-09 RX ADMIN — LIDOCAINE HYDROCHLORIDE 3 ML: 10 INJECTION, SOLUTION INFILTRATION; PERINEURAL at 10:06

## 2017-01-09 RX ADMIN — VANCOMYCIN HYDROCHLORIDE 1250 MG: 10 INJECTION, POWDER, LYOPHILIZED, FOR SOLUTION INTRAVENOUS at 23:59

## 2017-01-09 RX ADMIN — LEVOFLOXACIN 500 MG: 500 TABLET, FILM COATED ORAL at 20:30

## 2017-01-09 RX ADMIN — ACETAMINOPHEN 650 MG: 325 TABLET, FILM COATED ORAL at 17:54

## 2017-01-09 RX ADMIN — DIPHENHYDRAMINE HYDROCHLORIDE 25 MG: 25 CAPSULE ORAL at 14:04

## 2017-01-09 RX ADMIN — SENNOSIDES AND DOCUSATE SODIUM 1 TABLET: 8.6; 5 TABLET ORAL at 20:34

## 2017-01-09 ASSESSMENT — PAIN DESCRIPTION - DESCRIPTORS: DESCRIPTORS: ACHING

## 2017-01-09 NOTE — IP AVS SNAPSHOT
` `       Patient Information     Patient Name Sex     Bonny Raymundo ([id number]) Female 1945       Room Bed    7514 7514-01      Patient Demographics     Address Phone    [address]  [facility] [address] [phone] (Home) *Preferred*  none (Work)  none (Mobile)      Patient Ethnicity & Race     Ethnic Group Patient Race    American White      Emergency Contact(s)     Name Relation Home Work Mobile    Justo Carl Brother [phone] none none    Shakeel Raymundo [phone] none [phone]      Documents on File        Status Date Received Description       Documents for the Patient    Privacy Notice - Bureau Received 03     Insurance Card  03     Face Sheet  07     Insurance Card  06     External Medication Information Consent       Patient ID Received 12     Consent for Services - Hospital/Clinic Received 12     External Medication Information Consent Accepted 11     Consent for Services - Hospital/Clinic Received 11     CMS IM for Patient Signature       Insurance Card Received 12     Privacy Notice - Bureau Received 12     Consent for Services - Hospital/Clinic Received 12     Consent for Services - Hospital/Clinic       HIM JUANCARLOS Authorization - File Only   12  JUANCARLOS [place] [employer]    Advance Directives and Living Will Received 12/15/11     External Medication Information Consent Accepted 12     Consent for EHR Access  13 Copied from existing Consent for services - C/HOD collected on 2012    [facility] Specified Other       Consent for Services - Hospital/Clinic Received 13     Consent for Services - Hospital/Clinic Received 13     Patient ID Received 13     Insurance Card Received 14 [employer]    External Medication Information Consent Accepted 14     Patient ID Received 14 [place]    Consent for Services - Hospital/Clinic Received 14     Insurance Card  Received 09/15/16     Consent for Services - Hospital/Clinic Received 07/30/15     Consent for Services - Hospital/Clinic  07/31/15 CONSENT FOR SERVICE    Consent for Services/Privacy Notice - Hospital/Clinic Received 02/03/16     Consent for Services/Privacy Notice - Hospital/Clinic  02/05/16 CONSENT FOR SERVICES/PRIVACY NOTICE    Patient ID Received 09/16/16        Documents for the Encounter    CMS IM for Patient Signature Received 01/09/17       Admission Information     Attending Provider Admitting Provider Admission Type Admission Date/Time    Merritt Murray MD McClune, Brian Lee, MD Elective 01/09/17  0958    Discharge Date Hospital Service Auth/Cert Status Service Area     Hematology Incomplete [facility]    Unit Room/Bed Admission Status    UU U7D 7514/7514-01 Admission (Confirmed)            Admission     Complaint    aplastic anemia, Aplastic anemia (H)      Hospital Account     Name Acct ID Class Status Primary Coverage    Bonny Raymundo [id number] Inpatient Open [employer] - [employer]            Guarantor Account (for Hospital Account #[id number])     Name Relation to Pt Service Area Active? Acct Type    Bonny Raymundo  FCS Yes Personal/Family    Address Phone          [address] [place] [address] [phone](H)  none(O)              Coverage Information (for Hospital Account #[id number])     F/O Payor/Plan Precert #    [employer]     Subscriber Subscriber #    Bonny Raymundo [id number]    Address Phone    [address]  [place] [address] [phone]

## 2017-01-09 NOTE — IP AVS SNAPSHOT
MRN:[id number]                      After Visit Summary   1/9/2017    Bonny Raymundo    MRN: [id number]           Thank you!     Thank you for choosing [facility] for your care. Our goal is always to provide you with excellent care. Hearing back from our patients is one way we can continue to improve our services. Please take a few minutes to complete the written survey that you may receive in the mail after you visit with us. Thank you!        Patient Information     Date Of Birth          1945        About your hospital stay     You were admitted on:  January 9, 2017 You last received care in the:  [facility]    You were discharged on:  January 13, 2017        Reason for your hospital stay       Treatment for aplastic anemia with ATG, cyclosporine and eltrombopag                  Who to Call     For medical emergencies, please call 911.  For non-urgent questions about your medical care, please call your primary care provider or clinic, [phone]          Attending Provider     Provider    Merritt Murray MD       Primary Care Provider Office Phone # Fax #    Shivani Phelps PA-C [phone]       [facility] [place] [address] [facility] [address]  [place] [address]        After Care Instructions     Activity       Your activity upon discharge: activity as tolerated            Diet       Follow this diet upon discharge: Regular            Discharge Instructions       New medications: Cyclosporine take a total of 250 mg twice per day (this will be two 100 mg pills and two 25 mg pills), eltrombopag 50 mg once per day, prednisone 60 mg daily, protonix 40 mg in the morning before eating (this will help protect you stomach while taking prednisone. Amlodipine 5 mg daily for high blood pressure    Contact [facility] ([phone]) Monday - Friday during regular business hours (8-4:30), or call the main hospital number ([phone]) and request to  speak with the on-call hematology/oncology physician (after hours and weekends)  for temperature > 100.4, shortness of breath, chest pain, headaches, vision changes, bleeding, uncontrolled nausea, vomiting, diarrhea, or pain.            IV access       You are going home with the following vascular access device: PICC.                  Your next 10 appointments already scheduled     Benton 15, 2017  8:00 AM   Level 4 with SOUTH CHAIR 8   [facility] ([facility])    [facility] [place]  [address]  [place] [address]   [phone]            Jan 17, 2017 12:00 PM   [facility] Lab Draw with  MASONIC LAB DRAW   [facility] Lab Draw ([facility])    [address] [place] [address]  [address] [facility] [address]   [phone]            Jan 17, 2017 12:30 PM   (Arrive by 12:15 PM)   Return Visit with Celine Walls PA-C   [facility] ([facility])    [address] [place] [address]  [address] [facility] [address]   [phone]            Jan 19, 2017 10:00 AM   Level 5 with NORTH CHAIR 3   [facility] ([facility])    [facility] [place]  [address]  [place] [address]   [phone]            Jan 26, 2017  8:30 AM   Level 5 with NORTH CHAIR 3   [facility] ([facility])    [facility] [place]  [address]  [place] [address]   [phone]            Jan 26, 2017  2:30 PM   Masonic Lab Draw with  MASONIC LAB DRAW   [facility] Lab Draw ([facility])    [address] [place] [address]  2nd Floor  [facility] [address]   [phone]            Jan 26, 2017  3:00 PM   RETURN ONC with Rafita Rogel MD   [facility] ([facility])    [address] [place] [address]  Se  2nd Floor  [facility] [address]   [phone]            Feb 02, 2017  9:30 AM   Level 5 with NORTH CHAIR 3   [facility] ([facility])    [facility] [place]  [address]  [facility] [address]   [phone]              Additional Services     Home care nursing referral       [facility].  Phone  [phone]  Fax  [phone]    RN skilled nursing visit. RN to assess vital signs and weight, respiratory and cardiac status, pain level and activity tolerance, hydration, nutrition and bowel status and home safety.  RN to teach medication management.    Your provider has ordered home care nursing services. If you have not been contacted within 2 days of your discharge please call the inpatient department phone number at [phone] .            MED THERAPY MANAGE REFERRAL       Your provider has referred you to: **[facility] Scheduling (numerous locations) [phone]   [email or web address]  [facility]: [facility] [phone]   [email or web address]    Reason for Referral: Identified as a potential MTM candidate.    The [facility] department will contact you to schedule an appointment.  You may also schedule the appointment by calling [phone].  For [facility]   [place] patients, please call [phone] to confirm/schedule your appointment on the next business day.    This service is designed to help you get the most from your medications.  A specially trained Pharmacist will work closely with you and your providers to solve any questions, concerns, issues or problems related to your medications.    Please bring all of your prescription and non-prescription medications (such as vitamins, over-the-counter medications, and herbals) or a detailed medication list to  "your appointment.    If you have a glucose meter or other home monitoring information, please also bring this to your appointment (i.e. blood glucose log, blood pressure log, pain log, etc.).                  Future tests that were ordered for you     CBC with platelets and differential       Last Lab Result: HEMOGLOBIN (g/dL)       Date                     Value                 01/13/2017               7.7*             ----------            Basic metabolic panel  (Ca, Cl, CO2, Creat, Gluc, K, Na, BUN)           CMV DNA quantification           Cyclosporine                 Further instructions from your care team       Discharging RN or NST, Please fax at discharge to ensure continuity of care:    [facility].  Phone  [phone]  Fax  [phone]      Pending Results     Date and Time Order Name Status Description    1/9/2017 2115 Blood culture Preliminary     1/9/2017 2115 Blood culture Preliminary     1/9/2017 0859 IR PICC Placement > 5 Yrs of Age In process             Statement of Approval     Ordered          01/13/17 1220  I have reviewed and agree with all the recommendations and orders detailed in this document.   EFFECTIVE NOW     Approved and electronically signed by:  Susan Root PA-C             Admission Information        Provider Department Dept Phone    1/9/2017 Merritt Murray MD [facility] [phone]      Your Vitals Were     Blood Pressure Pulse Temperature    145/83 mmHg 74 97.7  F (36.5  C) (Oral)    Respirations Height Weight    18 1.6 m (5' 3\") 64.638 kg (142 lb 8 oz)    BMI (Body Mass Index) Pulse Oximetry       25.25 kg/m2 94%       MyChart Information     [employer] lets you send messages to your doctor, view your test results, renew your prescriptions, schedule appointments and more. To sign up, go to [email or web address] . Click on \"Log in\" on the left side of the screen, which will take you to the Welcome page. Then click on \"Sign up Now\" on the right side of the " page.     You will be asked to enter the access code listed below, as well as some personal information. Please follow the directions to create your username and password.     Your access code is: [email or web address]  Expires: 3/15/2017 11:47 AM     Your access code will  in 90 days. If you need help or a new code, please call your [facility] clinic or [phone].        Care EveryWhere ID     This is your Care EveryWhere ID. This could be used by other organizations to access your [facility] medical records  [id number]           Review of your medicines      START taking        Dose / Directions    amLODIPine 5 MG tablet   Commonly known as:  NORVASC   Used for:  Other secondary hypertension        Dose:  5 mg   Take 1 tablet (5 mg) by mouth daily   Quantity:  30 tablet   Refills:  0       * cycloSPORINE modified capsule   Used for:  Pancytopenia (H), Idiopathic aplastic anemia (H)        Dose:  200 mg   Take 2 capsules (200 mg) by mouth 2 times daily Along with two 25 mg capsules   Quantity:  120 capsule   Refills:  0       * cycloSPORINE modified 25 MG capsule   Commonly known as:  GENERIC EQUIVALENT   Used for:  Idiopathic aplastic anemia (H)        Dose:  50 mg   Take 2 capsules (50 mg) by mouth 2 times daily Along with two 100 mg capsules   Quantity:  120 capsule   Refills:  0       eltrombopag 50 MG tablet   Commonly known as:  PROMACTA   Used for:  Pancytopenia (H), Idiopathic aplastic anemia (H)        Dose:  50 mg   Take 1 tablet (50 mg) by mouth daily Administer on an empty stomach, 1 hour before or 2 hours after a meal.   Quantity:  30 tablet   Refills:  1       pantoprazole 40 MG EC tablet   Commonly known as:  PROTONIX   Used for:  Gastroesophageal reflux disease without esophagitis        Dose:  40 mg   Take 1 tablet (40 mg) by mouth every morning   Quantity:  30 tablet   Refills:  1       predniSONE 20 MG tablet   Commonly known as:  DELTASONE   Used for:  Pancytopenia (H), Idiopathic aplastic anemia  (H)        Dose:  1 mg/kg   Take 3 tablets (60 mg) by mouth daily   Quantity:  42 tablet   Refills:  0       * Notice:  This list has 2 medication(s) that are the same as other medications prescribed for you. Read the directions carefully, and ask your doctor or other care provider to review them with you.      CONTINUE these medicines which have NOT CHANGED        Dose / Directions    BENADRYL ALLERGY PO        Dose:  25 mg   Take 25 mg by mouth At Bedtime   Refills:  0       calcium-vitamin D 600-400 MG-UNIT per tablet   Commonly known as:  CALTRATE        Dose:  1 tablet   Take 1 tablet by mouth daily.   Refills:  0       chlorpheniramine 4 MG tablet   Commonly known as:  CHLOR-TRIMETON        Dose:  4 mg   Take 4 mg by mouth every 6 hours as needed. For head cold   Refills:  0       COMPRESSION STOCKINGS   Used for:  DVT (deep venous thrombosis), left, Postphlebitic syndrome        Wear compression stockings at 20-30 mmHg rating most time during the day to the affected leg (left leg) or both legs. Take them off at night.   Quantity:  2 each   Refills:  2       diclofenac 1 % Gel topical gel   Commonly known as:  VOLTAREN   Used for:  Myofascial pain        Dose:  2 g   Apply 2 g topically 2 times daily   Quantity:  100 g   Refills:  1       fluconazole 200 MG tablet   Commonly known as:  DIFLUCAN   Used for:  Pancytopenia (H)        Dose:  200 mg   Take 1 tablet (200 mg) by mouth daily   Quantity:  30 tablet   Refills:  3       levofloxacin 500 MG tablet   Commonly known as:  LEVAQUIN   Used for:  Pancytopenia (H)        Dose:  500 mg   Take 1 tablet (500 mg) by mouth daily   Quantity:  30 tablet   Refills:  3       loratadine 10 MG tablet   Commonly known as:  CLARITIN        Dose:  10 mg   Take 10 mg by mouth daily as needed. For head cold   Refills:  0       MULTIVITAMIN TABS   OR        1 tablet daily   Refills:  0       order for DME   Used for:  DVT prophylaxis        Equipment being ordered: knee high  compression stockings- 18-20 mm   Quantity:  1 Box   Refills:  1       TYLENOL PO        Dose:  325 mg   Take 325 mg by mouth every 6 hours as needed for mild pain or fever   Refills:  0            Where to get your medicines      These medications were sent to [facility] - [place], [place] - [address]  [address] [address]     Phone:  [phone]    - amLODIPine 5 MG tablet  - cycloSPORINE modified 25 MG capsule  - cycloSPORINE modified capsule  - eltrombopag 50 MG tablet  - pantoprazole 40 MG EC tablet  - predniSONE 20 MG tablet             Protect others around you: Learn how to safely use, store and throw away your medicines at www.disposemymeds.org.             Medication List: This is a list of all your medications and when to take them. Check marks below indicate your daily home schedule. Keep this list as a reference.      Medications           Morning Afternoon Evening Bedtime As Needed    amLODIPine 5 MG tablet   Commonly known as:  NORVASC   Take 1 tablet (5 mg) by mouth daily   Last time this was given:  5 mg on 1/13/2017  8:56 AM                                   BENADRYL ALLERGY PO   Take 25 mg by mouth At Bedtime   Last time this was given:  25 mg on 1/12/2017  8:34 PM                                   calcium-vitamin D 600-400 MG-UNIT per tablet   Commonly known as:  CALTRATE   Take 1 tablet by mouth daily.                                   chlorpheniramine 4 MG tablet   Commonly known as:  CHLOR-TRIMETON   Take 4 mg by mouth every 6 hours as needed. For head cold                                   COMPRESSION STOCKINGS   Wear compression stockings at 20-30 mmHg rating most time during the day to the affected leg (left leg) or both legs. Take them off at night.                                      * cycloSPORINE modified capsule   Take 2 capsules (200 mg) by mouth 2 times daily Along with two 25 mg capsules   Last time this was given:  250 mg on  1/13/2017  8:52 AM                                      * cycloSPORINE modified 25 MG capsule   Commonly known as:  GENERIC EQUIVALENT   Take 2 capsules (50 mg) by mouth 2 times daily Along with two 100 mg capsules                                      diclofenac 1 % Gel topical gel   Commonly known as:  VOLTAREN   Apply 2 g topically 2 times daily   Last time this was given:  2 g on 1/13/2017  8:52 AM                                eltrombopag 50 MG tablet   Commonly known as:  PROMACTA   Take 1 tablet (50 mg) by mouth daily Administer on an empty stomach, 1 hour before or 2 hours after a meal.   Last time this was given:  50 mg on 1/13/2017  8:56 AM                                   fluconazole 200 MG tablet   Commonly known as:  DIFLUCAN   Take 1 tablet (200 mg) by mouth daily   Last time this was given:  200 mg on 1/13/2017  8:52 AM                                   levofloxacin 500 MG tablet   Commonly known as:  LEVAQUIN   Take 1 tablet (500 mg) by mouth daily   Last time this was given:  500 mg on 1/13/2017  8:52 AM                                   loratadine 10 MG tablet   Commonly known as:  CLARITIN   Take 10 mg by mouth daily as needed. For head cold                                   MULTIVITAMIN TABS   OR   1 tablet daily                                   order for DME   Equipment being ordered: knee high compression stockings- 18-20 mm                                pantoprazole 40 MG EC tablet   Commonly known as:  PROTONIX   Take 1 tablet (40 mg) by mouth every morning   Last time this was given:  40 mg on 1/13/2017  8:52 AM                                   predniSONE 20 MG tablet   Commonly known as:  DELTASONE   Take 3 tablets (60 mg) by mouth daily   Last time this was given:  1/13/2017  8:52 AM                                   TYLENOL PO   Take 325 mg by mouth every 6 hours as needed for mild pain or fever   Last time this was given:  650 mg on 1/13/2017  2:38 PM                                    * Notice:  This list has 2 medication(s) that are the same as other medications prescribed for you. Read the directions carefully, and ask your doctor or other care provider to review them with you.

## 2017-01-09 NOTE — IP AVS SNAPSHOT
[address] [address]    Phone:  [phone]                                       After Visit Summary   1/9/2017    Bonny Raymundo    MRN: [id number]           After Visit Summary Signature Page     I have received my discharge instructions, and my questions have been answered. I have discussed any challenges I see with this plan with the nurse or doctor.    ..........................................................................................................................................  Patient/Patient Representative Signature      ..........................................................................................................................................  Patient Representative Print Name and Relationship to Patient    ..................................................               ................................................  Date                                            Time    ..........................................................................................................................................  Reviewed by Signature/Title    ...................................................              ..............................................  Date                                                            Time

## 2017-01-09 NOTE — H&P
[facility], [place]    History and Physical  Hospitalist       Date of Admission:  1/9/2017  Date of Service (when I saw the patient): 01/09/2017    Assessment and Plan  Bonny Raymundo is a 71 year old female with idiopathic aplastic anemia, PMH of provoked DVT x 2 and PE who presents with to start treatment with ATG, cyclosporine, eltrombopag and steroids.     HEME:  #Aplastic anemia: pancytopenia. First found on CBC 9/16 with symptoms of SOB. Primary hematologist is Dr. Rogel. Admitted to start treatment. PICC line placed prior to admission. No evidence of infection.   - Transfuse prn for hgb <7 and plt <10k    REGIMEN: Day 1= 1/9/17  ATG 2500 mg (40 mg/kg0 q24 hours D-4  Cyclosporine 200 mg (3 mg/kg) PO bid  Eltrombopag 50 mg daily  Methylpred 31 mg (0.5 mg/kg) q24 hours D1-4  Prednisone 60 mg (1 mg/kg) daily after completion of IV methylpred to continue for at least 2 weeks    ID:  #PPX: continue PTA fluconazole 200 mg daily, levaquin 500 mg daily     CV:  #Hx DVT: 2 episodes of provoked DVTs in 2012 and 2014 (travel related) with associated PE in 2012. Treated in the past with warfarin and lovenox. Transitioned to ASA which was stopped in the setting of thrombocytopenia    HEENT:  #Allergies: Continue prn claritin and benadryl qhs    FEN:  Regular diet  Electrolyte replacement prn per protocol    DVT Prophylaxis: VTE Prophylaxis contraindicated due to thrombocytopenia  Code Status: Full Code    Disposition: Expected discharge in 5 days once ATG is completed if she is tolerating it well.    Susan Root PA-C  Hematology/Oncology    Primary Care Physician  Shivani Phelps    Chief Complaint   Aplastic Anemia, admission to start treatment    History is obtained from the patient and chart review    History of Present Illness  Bonny Raymundo is a 71 year old female with idiopathic aplastic anemia, PMH of provoked DVT x 2 and PE who presents with to start treatment with  ATG, cyclosporine, eltrombopag and steroids. She presented to primary care with SOB in 2016 and was found on CBC to have pancytopenia. She went through an extensive hematologic work up and was diagnosed with idiopathic aplastic anemia. She has been requiring regular transfusions of prbcs and platelets. She still has mild OCASIO. She complains of some bruising. She has not had any other rashes. She has some chronic mild swelling on the left ankle since prior DVT. No new swelling. She denies recent fevers or chills. No abd pain. No n/v/d. No constipation. No dysuria. She has some chronic nasal drainage from allergies for which she takes prn claritin. Occasional blood when she blows her nose. No other bleeding symptoms. She denies mouth sores. Normal appetite. ROS is otherwise negative.     Past Medical History   I have reviewed this patient's medical history and updated it with pertinent information if needed.   Past Medical History   Diagnosis Date     Osteoporosis, unspecified      Allergic rhinitis due to other allergen      Headache(784.0)      Sensorineural hearing loss, unspecified      Closed anterior dislocation of humerus      Sprain of ankle, unspecified site      L     DVT of lower extremity (deep venous thrombosis) (H) 10-12     Left after prolonged sitting-plane     Pulmonary emboli (H) 10-12     DVT, recurrent, lower extremity, acute (H)        Past Surgical History  I have reviewed this patient's surgical history and updated it with pertinent information if needed.  Past Surgical History   Procedure Laterality Date     C nonspecific procedure            C nonspecific procedure       hysterectomy/BSO     C nonspecific procedure       myomectomy (fibroids)     Hc colonoscopy thru stoma, diagnostic       normal- minimal diverticulosis     C dexa interpretation, axial  03     Arthrodesis foot  11     Hallux valgus R-1st MP joint     Cataract iol, rt/lt Right      Cataract  iol, rt/lt Left 1985     Blepharoplasty bilateral  ,      Exchange intraocular lens implant Right 2015     Procedure: EXCHANGE INTRAOCULAR LENS IMPLANT;  Surgeon: Garrett Dawson MD;  Location:  [facility]     Vitrectomy parsplana with 23 gauge system Right 2015     Procedure: VITRECTOMY PARSPLANA WITH 23 GAUGE SYSTEM;  Surgeon: Racheal Loyd MD;  Location:  [facility]     Bone marrow biopsy, bone specimen, needle/trocar N/A 2016     Procedure: BIOPSY BONE MARROW;  Surgeon: Mu Morgan MD;  Location:  GI     Bone marrow biopsy, bone specimen, needle/trocar N/A 2016     Procedure: BIOPSY BONE MARROW;  Surgeon: Mu Morgan MD;  Location:  GI     Picc insertion Left 2017     5fr DL BioFlo PICC, 42cm (2cm external) in the L basilic vein w/ tip in the  SVC RA junction.       Prior to Admission Medications  Prior to Admission Medications   Prescriptions Last Dose Informant Patient Reported? Taking?   Acetaminophen (TYLENOL PO) 2017 at Unknown time Self Yes Yes   Sig: Take 325 mg by mouth every 6 hours as needed for mild pain or fever   COMPRESSION STOCKINGS Unknown at Unknown time Self No No   Sig: Wear compression stockings at 20-30 mmHg rating most time during the day to the affected leg (left leg) or both legs. Take them off at night.   DiphenhydrAMINE HCl (BENADRYL ALLERGY PO) 2017 at Unknown time Self Yes Yes   Sig: Take 25 mg by mouth At Bedtime    MULTIVITAMIN TABS   OR 2017 at Unknown time Self No Yes   Si tablet daily   ORDER FOR DME Unknown at Unknown time  No No   Sig: Equipment being ordered: knee high compression stockings- 18-20 mm   calcium-vitamin D (CALTRATE) 600-400 MG-UNIT per tablet 2017 at Unknown time Self Yes Yes   Sig: Take 1 tablet by mouth daily.     chlorpheniramine (CHLOR-TRIMETON) 4 MG tablet Unknown at Unknown time Self Yes No   Sig: Take 4 mg by mouth every 6 hours as needed. For head cold    diclofenac  (VOLTAREN) 1 % GEL topical gel 1/8/2017 at Unknown time  No Yes   Sig: Apply 2 g topically 2 times daily   fluconazole (DIFLUCAN) 200 MG tablet 1/8/2017 at Unknown time  Yes Yes   Sig: Take 1 tablet (200 mg) by mouth daily   levofloxacin (LEVAQUIN) 500 MG tablet 1/8/2017 at Unknown time  No Yes   Sig: Take 1 tablet (500 mg) by mouth daily   loratadine (CLARITIN) 10 MG tablet Past Month at Unknown time Self Yes Yes   Sig: Take 10 mg by mouth daily as needed. For head cold       Facility-Administered Medications: None     Allergies  Allergies   Allergen Reactions     Cats      Dogs      No Known Drug Allergies      Seasonal Allergies        Social History  I have reviewed this patient's social history and updated it with pertinent information if needed. Bonny Raymundo  reports that she quit smoking about 43 years ago. She has never used smokeless tobacco. She reports that she does not drink alcohol or use illicit drugs.    Family History  I have reviewed this patient's family history and updated it with pertinent information if needed.   Family History   Problem Relation Age of Onset     Musculoskeletal Disorder Mother      MS     HEART DISEASE Father      HEART DISEASE Brother      afib       Review of Systems  The 10 point Review of Systems is negative other than noted in the HPI or here.   Physical Exam  Temp: 95.6  F (35.3  C) Temp src: Oral BP: 133/66 mmHg Pulse: 87   Resp: 16 SpO2: 99 % O2 Device: None (Room air)    Vital Signs with Ranges  Temp:  [95.6  F (35.3  C)] 95.6  F (35.3  C)  Pulse:  [87] 87  Resp:  [16] 16  BP: (133)/(66) 133/66 mmHg  SpO2:  [99 %] 99 %  137 lbs 11.2 oz    Constitutional: Well appearing, in no acute distress  Eyes: PERRL, EOMs intact, sclera anicteric, conjunctiva clear  HEENT: normocephalic, atraumatic, moist mucus membranes, no thrush or mucositis  Respiratory: clear to auscultation bilaterally  Cardiovascular: RRR, S1S2, no m/r/g  GI: soft, nondistended, nontender  Genitourinary:  deferred  Skin: No visible rashes, PICC in place  Musculoskeletal: Trace LLE edema, no RLE edema, no swollen or erythematous joints  Neurologic: alert and oriented x 3, no focal deficits  Psychiatric: appropriate affect    Data  Data reviewed today:  I personally reviewed no images or EKG's today.    Recent Labs  Lab 01/04/17  1500   WBC 2.1*   HGB 8.7*   MCV 93   PLT 6*       No results found for this or any previous visit (from the past 24 hour(s)).     Staff Addendum: Patient was seen and examined by me. Full H&P, all  labs, VS and pertinent images were reviewed with the team on rounds. Plans for care were mutually developed and outlined above with my direct input.    Interval Events/ROS: New patient admit for treatment of underlying AA. Dates back to 8/2016 when Ms. Raymundo presented with pancytopenia. Work-up eventually revealed AA. Initially pt was managed with transfusions but with worsening transfusion requirements as of late, treatment was deemed necessary. She is feeling well today. She has no infectious sx. She feels able and ready to start.     Rest as above.     Exam: NAD/AAO, cooperative with exam. OP and lungs were clear, HRRR, Abd soft/NT/ND, no HSM or masses, No LE edema, Nro grossly non-focal, Skin exam was without rash. Few scattered bruised noted on extrems.     A/P: 70 yo F with idiopathic AMELIA here for immunosuppressive therapy  - No contraindications to starting therapy today.   - Std parameters for transfusions entered along with std infectious prophy  - Will monitor CMV PCRs and CSA levels weekly and as appropriate.   - Further per clinical course.     Merritt Murray,

## 2017-01-09 NOTE — PROGRESS NOTES
DATE/TIME  (DOT-TD, DOT-NOW) CHEMO CHECK ACTIVITY (REGIMEN & DOSE CHECK, DAY, DOSE #, NAME OF CHEMO #1)  CHEMO DRUG #2  CHEMO DRUG #3 NAME OF RN #1 (USE DOT-ME HERE) NAME OF RN#2 (2ND RN TO LOG IN SEPARATELY)   01/09/2017  11:43 AM   ATG protocol double check   Karley Ponce     1/09/2017  2:37 PM   ATG Dose 1   Fanta Monroe)

## 2017-01-09 NOTE — PLAN OF CARE
Problem: Goal Outcome Summary  Goal: Goal Outcome Summary  Admission    D: Pt admitted from home for ATG infusion. VSS. Double lumen PICC in place.   I: [place] to room and call light. PTA meds reconciled. Admission profile complete.   A: Pt steady on feet, up independent in room. Alert and oriented, asking good questions.  P: Medicate per MAR, continue POC.

## 2017-01-09 NOTE — LETTER
Transition Communication Hand-off for Care Transitions to Next Level of Care Provider    Name: Bonny Raymundo  MRN #: [id number]  Primary Care Provider: Shivani Phelps   Hematologist: Dr. Rogel, [facility]     Primary Clinic: [facility] [address] [facility] [address]  [place] [address]     Reason for Hospitalization:  aplastic anemia  Aplastic anemia (H)  Admit Date/Time: 1/9/2017  9:58 AM  Discharge Date: 01/13/2017  Payor Source: Payor: [facility] / Plan: [employer] / Product Type: HMO /     Plan of Care Goals/Milestone Events:   Patient Concerns: Lives alone with limited support.          Bonny Raymundo is a 71 year old female with idiopathic aplastic anemia, PMH of provoked DVT x 2 and PE admitted for ATG, cyclosporine, eltrombopag and steroids.     Discharge Plan: Home with frequent clinic visits. Home RN ordered for safety evaluation and to reinforce PICC education. PICC cares to be done in clinic.      Concern for non-adherence with plan of care: No     Discharge Needs Assessment:  Needs       Most Recent Value    Home Care [facility] [phone], Fax: [phone]        Follow-up plan:  Future Appointments  Date Time Provider Department Center   1/15/2017 8:00 AM S[facility]TH [facility] 8 [facility]   1/17/2017 12:00 PM  [facility]   1/17/2017 12:30 PM Celine Walls PA-C [facility]   1/19/2017 10:00 AM NORTH [facility] 3 [facility]   1/26/2017 8:30 AM NORTH [facility] 3 [facility]   1/26/2017 2:30 PM  [facility]   1/26/2017 3:00 PM Rafita Rogel MD [facility]   2/2/2017 9:30 AM NORTH [facility] 3 [facility]              Referrals     Future Labs/Procedures    Home care nursing referral     Comments:    [facility]  Phone  [phone]  Fax  [phone]    RN skilled nursing visit. RN to assess vital signs and weight, respiratory and cardiac status, pain level and activity tolerance, hydration, nutrition  and bowel status and home safety.  RN to teach medication management.    Your provider has ordered home care nursing services. If you have not been contacted within 2 days of your discharge please call the inpatient department phone number at [phone] .    MED THERAPY MANAGE REFERRAL     Comments:    Your provider has referred you to: **[facility] Scheduling (numerous locations) [phone]   [email or web address]  [facility]: [facility] [phone]   [email or web address]    Reason for Referral: Identified as a potential MTM candidate.    The [facility] department will contact you to schedule an appointment.  You may also schedule the appointment by calling [phone].  For [facility] patients, please call [phone] to confirm/schedule your appointment on the next business day.    This service is designed to help you get the most from your medications.  A specially trained Pharmacist will work closely with you and your providers to solve any questions, concerns, issues or problems related to your medications.    Please bring all of your prescription and non-prescription medications (such as vitamins, over-the-counter medications, and herbals) or a detailed medication list to your appointment.    If you have a glucose meter or other home monitoring information, please also bring this to your appointment (i.e. blood glucose log, blood pressure log, pain log, etc.).            Ansley Stoner RNCC  Phone [phone]  Pager [phone]    AVS/Discharge Summary is the source of truth; this is a helpful guide for improved communication of patient story

## 2017-01-10 ENCOUNTER — APPOINTMENT (OUTPATIENT)
Dept: GENERAL RADIOLOGY | Facility: CLINIC | Age: 72
DRG: 810 | End: 2017-01-10
Attending: PHYSICIAN ASSISTANT
Payer: COMMERCIAL

## 2017-01-10 LAB
ANION GAP SERPL CALCULATED.3IONS-SCNC: 9 MMOL/L (ref 3–14)
BLD PROD TYP BPU: NORMAL
BLD UNIT ID BPU: 0
BLOOD PRODUCT CODE: NORMAL
BPU ID: NORMAL
BUN SERPL-MCNC: 27 MG/DL (ref 7–30)
CALCIUM SERPL-MCNC: 7.9 MG/DL (ref 8.5–10.1)
CHLORIDE SERPL-SCNC: 111 MMOL/L (ref 94–109)
CO2 SERPL-SCNC: 22 MMOL/L (ref 20–32)
CREAT SERPL-MCNC: 0.79 MG/DL (ref 0.52–1.04)
DIFFERENTIAL METHOD BLD: ABNORMAL
ERYTHROCYTE [DISTWIDTH] IN BLOOD BY AUTOMATED COUNT: 17.9 % (ref 10–15)
GFR SERPL CREATININE-BSD FRML MDRD: 72 ML/MIN/1.7M2
GLUCOSE SERPL-MCNC: 160 MG/DL (ref 70–99)
HCT VFR BLD AUTO: 22.9 % (ref 35–47)
HGB BLD-MCNC: 7.5 G/DL (ref 11.7–15.7)
MCH RBC QN AUTO: 30.2 PG (ref 26.5–33)
MCHC RBC AUTO-ENTMCNC: 32.8 G/DL (ref 31.5–36.5)
MCV RBC AUTO: 92 FL (ref 78–100)
NUM BPU REQUESTED: 1
NUM BPU REQUESTED: 1
PLATELET # BLD AUTO: 7 10E9/L (ref 150–450)
POTASSIUM SERPL-SCNC: 4.1 MMOL/L (ref 3.4–5.3)
RBC # BLD AUTO: 2.48 10E12/L (ref 3.8–5.2)
SODIUM SERPL-SCNC: 142 MMOL/L (ref 133–144)
TRANSFUSION STATUS PATIENT QL: NORMAL
WBC # BLD AUTO: 0.4 10E9/L (ref 4–11)

## 2017-01-10 PROCEDURE — 25000130 H RX MED GY IP 250 OP 259 PS 637: Performed by: PHYSICIAN ASSISTANT

## 2017-01-10 PROCEDURE — 25000132 ZZH RX MED GY IP 250 OP 250 PS 637: Performed by: INTERNAL MEDICINE

## 2017-01-10 PROCEDURE — 25000132 ZZH RX MED GY IP 250 OP 250 PS 637: Performed by: PHYSICIAN ASSISTANT

## 2017-01-10 PROCEDURE — 25000125 ZZHC RX 250: Performed by: INTERNAL MEDICINE

## 2017-01-10 PROCEDURE — 40000940 XR CHEST PORT 1 VW

## 2017-01-10 PROCEDURE — 80048 BASIC METABOLIC PNL TOTAL CA: CPT | Performed by: PHYSICIAN ASSISTANT

## 2017-01-10 PROCEDURE — 85027 COMPLETE CBC AUTOMATED: CPT

## 2017-01-10 PROCEDURE — 25000128 H RX IP 250 OP 636: Performed by: INTERNAL MEDICINE

## 2017-01-10 PROCEDURE — 86923 COMPATIBILITY TEST ELECTRIC: CPT | Performed by: INTERNAL MEDICINE

## 2017-01-10 PROCEDURE — P9037 PLATE PHERES LEUKOREDU IRRAD: HCPCS | Performed by: PHYSICIAN ASSISTANT

## 2017-01-10 PROCEDURE — 86850 RBC ANTIBODY SCREEN: CPT | Performed by: INTERNAL MEDICINE

## 2017-01-10 PROCEDURE — 36592 COLLECT BLOOD FROM PICC: CPT | Performed by: PHYSICIAN ASSISTANT

## 2017-01-10 PROCEDURE — 12000008 ZZH R&B INTERMEDIATE UMMC

## 2017-01-10 PROCEDURE — 25000130 H RX MED GY IP 250 OP 259 PS 637: Performed by: INTERNAL MEDICINE

## 2017-01-10 PROCEDURE — P9040 RBC LEUKOREDUCED IRRADIATED: HCPCS | Performed by: INTERNAL MEDICINE

## 2017-01-10 PROCEDURE — 40000802 ZZH SITE CHECK

## 2017-01-10 PROCEDURE — 86900 BLOOD TYPING SEROLOGIC ABO: CPT | Performed by: INTERNAL MEDICINE

## 2017-01-10 PROCEDURE — 86901 BLOOD TYPING SEROLOGIC RH(D): CPT | Performed by: INTERNAL MEDICINE

## 2017-01-10 RX ORDER — BUTALBITAL, ACETAMINOPHEN AND CAFFEINE 50; 325; 40 MG/1; MG/1; MG/1
1 TABLET ORAL
Status: COMPLETED | OUTPATIENT
Start: 2017-01-10 | End: 2017-01-10

## 2017-01-10 RX ORDER — BUTALBITAL, ACETAMINOPHEN AND CAFFEINE 50; 325; 40 MG/1; MG/1; MG/1
1 TABLET ORAL
Status: DISCONTINUED | OUTPATIENT
Start: 2017-01-10 | End: 2017-01-13 | Stop reason: HOSPADM

## 2017-01-10 RX ORDER — OXYCODONE HYDROCHLORIDE 5 MG/1
5-10 TABLET ORAL ONCE
Status: COMPLETED | OUTPATIENT
Start: 2017-01-10 | End: 2017-01-10

## 2017-01-10 RX ORDER — HYDRALAZINE HYDROCHLORIDE 20 MG/ML
10 INJECTION INTRAMUSCULAR; INTRAVENOUS ONCE
Status: COMPLETED | OUTPATIENT
Start: 2017-01-10 | End: 2017-01-10

## 2017-01-10 RX ORDER — SODIUM CHLORIDE 9 MG/ML
INJECTION, SOLUTION INTRAVENOUS CONTINUOUS
Status: DISCONTINUED | OUTPATIENT
Start: 2017-01-10 | End: 2017-01-11

## 2017-01-10 RX ADMIN — CEFEPIME 2 G: 2 INJECTION, POWDER, FOR SOLUTION INTRAMUSCULAR; INTRAVENOUS at 08:47

## 2017-01-10 RX ADMIN — PANTOPRAZOLE SODIUM 40 MG: 40 TABLET, DELAYED RELEASE ORAL at 08:40

## 2017-01-10 RX ADMIN — HYDRALAZINE HYDROCHLORIDE 10 MG: 20 INJECTION INTRAMUSCULAR; INTRAVENOUS at 17:40

## 2017-01-10 RX ADMIN — DIPHENHYDRAMINE HYDROCHLORIDE 25 MG: 25 CAPSULE ORAL at 19:09

## 2017-01-10 RX ADMIN — DIPHENHYDRAMINE HYDROCHLORIDE 25 MG: 25 CAPSULE ORAL at 13:26

## 2017-01-10 RX ADMIN — ELTROMBOPAG OLAMINE 50 MG: 25 TABLET, FILM COATED ORAL at 08:40

## 2017-01-10 RX ADMIN — CYCLOSPORINE 200 MG: 100 CAPSULE ORAL at 08:40

## 2017-01-10 RX ADMIN — ACETAMINOPHEN 650 MG: 325 TABLET, FILM COATED ORAL at 22:21

## 2017-01-10 RX ADMIN — SODIUM CHLORIDE: 9 INJECTION, SOLUTION INTRAVENOUS at 01:20

## 2017-01-10 RX ADMIN — SODIUM CHLORIDE: 9 INJECTION, SOLUTION INTRAVENOUS at 19:17

## 2017-01-10 RX ADMIN — ACETAMINOPHEN 650 MG: 325 TABLET, FILM COATED ORAL at 02:26

## 2017-01-10 RX ADMIN — CEFEPIME 2 G: 2 INJECTION, POWDER, FOR SOLUTION INTRAMUSCULAR; INTRAVENOUS at 16:23

## 2017-01-10 RX ADMIN — OXYCODONE HYDROCHLORIDE 5 MG: 5 TABLET ORAL at 21:23

## 2017-01-10 RX ADMIN — CYCLOSPORINE 200 MG: 100 CAPSULE ORAL at 19:09

## 2017-01-10 RX ADMIN — FLUCONAZOLE 200 MG: 200 TABLET ORAL at 08:39

## 2017-01-10 RX ADMIN — METHYLPREDNISOLONE SODIUM SUCCINATE 2500 MG: 125 INJECTION, POWDER, LYOPHILIZED, FOR SOLUTION INTRAMUSCULAR; INTRAVENOUS at 13:54

## 2017-01-10 RX ADMIN — BUTALBITAL, ACETAMINOPHEN AND CAFFEINE 1 TABLET: 50; 325; 40 TABLET ORAL at 05:28

## 2017-01-10 RX ADMIN — ACETAMINOPHEN 650 MG: 325 TABLET, FILM COATED ORAL at 13:26

## 2017-01-10 ASSESSMENT — PAIN DESCRIPTION - DESCRIPTORS: DESCRIPTORS: HEADACHE

## 2017-01-10 NOTE — PLAN OF CARE
Problem: Goal Outcome Summary  Goal: Goal Outcome Summary  Outcome: No Change  Tmax 101.8f. HRmax 102. AOVSS. Sepsis protocol triggered. VS, LA, and BC completed. LA 3.5. MD paged and assessed pt. X-ray completed, UA/UC sent, AB started, and will start fluids (awaiting order). Cefepime infusing. Awaiting vanco from pharmacy. Pt completed first dose out of 4 of ATG over 7hrs as it was first dose. Subsequent doses may be given over 4hrs. Plts 9 and will need to be replaced. Hgb 7 and will need to be replaced. Pt up ad derick. Voiding adequately and having adequate intake. O2 stats dropped to 89%. Currently 1L NC. Continue with POC.

## 2017-01-10 NOTE — PHARMACY-VANCOMYCIN DOSING SERVICE
Pharmacy Vancomycin Initial Note  Date of Service 2017  Patient's  1945  71 year old, female    Indication: Febrile Neutropenia    Current estimated CrCl = Estimated Creatinine Clearance: 52 mL/min (based on Cr of 0.95).    Creatinine for last 3 days  2017:  1:54 PM Creatinine 0.95 mg/dL    Recent Vancomycin Level(s) for last 3 days  No results found for requested labs within last 3 days.      Vancomycin IV Administrations (past 72 hours)      No vancomycin orders with administrations in past 72 hours.                Nephrotoxins and other renal medications (Future)    Start     Dose/Rate Route Frequency Ordered Stop    17  vancomycin (VANCOCIN) 1,250 mg in NaCl 0.9 % 250 mL intermittent infusion      1,250 mg Intravenous EVERY 12 HOURS 17  cycloSPORINE modified (GENGRAF BRAND) capsule 200 mg      3 mg/kg × 62.5 kg (Treatment Plan Actual) Oral 2 TIMES DAILY. 17 1204            Contrast Orders - past 72 hours     None                Plan:  1.  Start vancomycin  1250 mg IV q12h.   2.  Goal Trough Level: 15-20 mg/L   3.  Pharmacy will check trough levels as appropriate in 1-3 Days.    4. Serum creatinine levels will be ordered daily for the first week of therapy and at least twice weekly for subsequent weeks.    5. Kennedy method utilized to dose vancomycin therapy: Method 1    Bairon Flores

## 2017-01-10 NOTE — PROVIDER NOTIFICATION
Sepsis protocol triggered. LA results 3.5. Paged to come assess. BC ordered per RN scope of practice. MD placed orders for UA, x-ray, and new AB.

## 2017-01-10 NOTE — PLAN OF CARE
Problem: Chemotherapy Effects (Adult)  Goal: Signs and Symptoms of Listed Potential Problems Will be Absent or Manageable (Chemotherapy Effects)  Signs and symptoms of listed potential problems will be absent or manageable by discharge/transition of care (reference Chemotherapy Effects (Adult) CPG).   Tmax 99.5. AOVSS. PRN tylenol given for headache with no relief, MD paged and gave pt one time dose of fioricet given.  1 unit of plts for plts of 9, 1 unit RBCs for hgb of 7.  Pt denies N/V. NS started @ 75/hour. Pt very tired. Continue to monitor and with POC.

## 2017-01-10 NOTE — PROGRESS NOTES
[facility], [place]    Hematology / Oncology Progress Note    Date of Service (when I saw the patient): 01/10/2017     Assessment and Plan  Bonny Raymundo is a 71 year old female with idiopathic aplastic anemia, PMH of provoked DVT x 2 and PE who presents with to start treatment with ATG, cyclosporine, eltrombopag and steroids.     HEME:  #Aplastic anemia: pancytopenia. First found on CBC 9/16 with symptoms of SOB. Primary hematologist is Dr. Rogel. Admitted to start treatment. PICC line placed prior to admission.   - Transfuse prn for hgb <7 and plt <10k    REGIMEN: Day 1= 1/9/17; Today is D2  ATG 2500 mg (40 mg/kg0 q24 hours D-4  Cyclosporine 200 mg (3 mg/kg) PO bid  Eltrombopag 50 mg daily  Methylpred 31 mg (0.5 mg/kg) q24 hours D1-4  Prednisone 60 mg (1 mg/kg) daily after completion of IV methylpred to continue for at least 2 weeks    ID:  #PPX: continue PTA fluconazole 200 mg daily.  levaquin 500 mg daily held while on cefepime    #Neutropenic fever: Spiked a fever shortly after receiving ATG yesterday. LA was 3.5. CXR no evidence of infection. UA negative. BC NGTD. She was given vanc and cefepime. No new symptoms of infection. This is likely a reaction to ATG.   - Continue cefepime for now as she is neutropenic. If her BC remain negative after 24 hours will d/c cefepime and resume ppx levaquin    CV:  #Hx DVT: 2 episodes of provoked DVTs in 2012 and 2014 (travel related) with associated PE in 2012. Treated in the past with warfarin and lovenox. Transitioned to ASA which was stopped in the setting of thrombocytopenia    HEENT:  #Allergies: Continue prn claritin and benadryl qhs    PULM:  #Hypoxia: she was mildly hypoxic (89%) while febrile last night. Also likely due to ATG. This AM 98% on RA.     FEN:  Regular diet  Electrolyte replacement prn per protocol  On CXR PICC was into the azygos, paged vascular access to try to reposition it    DVT Prophylaxis: VTE Prophylaxis  contraindicated due to thrombocytopenia  Code Status: Full Code    Disposition: Expected discharge in 4 days once ATG is completed if she is tolerating it well.    Susan Root PA-C  Hematology/Oncology    Interval History  She states that she is feeling fine. She was mildly chilled and warm when she was febrile. No other side effects from ATG. No rashes. No n/v/d. No abd pain. No sinus pain. Mild headaches. No SOB, CP or cough. No dysuria.     Physical Exam  Temp: 97  F (36.1  C) Temp src: Oral BP: 142/78 mmHg Pulse: 71   Resp: 16 SpO2: 98 % O2 Device: None (Room air)    Filed Vitals:    01/09/17 1012 01/10/17 0923   Weight: 62.46 kg (137 lb 11.2 oz) 63.231 kg (139 lb 6.4 oz)     Vital Signs with Ranges  Temp:  [95.9  F (35.5  C)-101.8  F (38.8  C)] 97  F (36.1  C)  Pulse:  [] 71  Resp:  [16-20] 16  BP: ()/(55-78) 142/78 mmHg  SpO2:  [89 %-98 %] 98 %  I/O last 3 completed shifts:  In: 1393 [P.O.:790; I.V.:100]  Out: 1400 [Urine:1400]    Constitutional: Well appearing, in no acute distress  Eyes: sclera anicteric, conjunctiva clear  HEENT: normocephalic, atraumatic, moist mucus membranes, no thrush or mucositis  Respiratory: clear to auscultation bilaterally  Cardiovascular: RRR, S1S2, no m/r/g  GI: soft, nondistended, nontender  Genitourinary: deferred  Skin: No visible rashes, PICC in place  Musculoskeletal: Trace LLE edema, no RLE edema, no swollen or erythematous joints  Neurologic: alert and oriented x 3, no focal deficits  Psychiatric: appropriate affect    Medications    NaCl 75 mL/hr at 01/10/17 0120     - MEDICATION INSTRUCTIONS -        -INFUSION HYPERSENSITIVITY-         ceFEPIme (MAIXPIME) IV  2 g Intravenous Q8H     senna-docusate  1-2 tablet Oral BID     sodium chloride (PF)  10 mL Intracatheter Q7 Days     fluconazole  200 mg Oral Daily     diphenhydrAMINE  25 mg Oral At Bedtime     diclofenac  2 g Topical BID     acetaminophen  650 mg Oral Q24H     diphenhydrAMINE  25 mg Oral  Q24H     eltrombopag  50 mg Oral Daily     anti-thymo immune glob (ATGAM - equine) intermittent infusion  40 mg/kg (Treatment Plan Actual) Intravenous Q24H     cycloSPORINE modified  3 mg/kg (Treatment Plan Actual) Oral BID IS     methylPREDNISolone  0.5 mg/kg (Treatment Plan Actual) Intravenous Q24H     [START ON 1/13/2017] predniSONE  1 mg/kg (Treatment Plan Actual) Oral Daily     pantoprazole  40 mg Oral QAM       Data  Results for orders placed or performed during the hospital encounter of 01/09/17 (from the past 24 hour(s))   CBC with platelets differential   Result Value Ref Range    WBC 2.3 (L) 4.0 - 11.0 10e9/L    RBC Count 2.30 (L) 3.8 - 5.2 10e12/L    Hemoglobin 7.0 (L) 11.7 - 15.7 g/dL    Hematocrit 21.6 (L) 35.0 - 47.0 %    MCV 94 78 - 100 fl    MCH 30.4 26.5 - 33.0 pg    MCHC 32.4 31.5 - 36.5 g/dL    RDW 19.3 (H) 10.0 - 15.0 %    Platelet Count 9 (LL) 150 - 450 10e9/L    Diff Method Automated Method     % Neutrophils 9.9 %    % Lymphocytes 86.7 %    % Monocytes 3.0 %    % Eosinophils 0.4 %    % Basophils 0.0 %    % Immature Granulocytes 0.0 %    Nucleated RBCs 2 (H) 0 /100    Absolute Neutrophil 0.2 (LL) 1.6 - 8.3 10e9/L    Absolute Lymphocytes 2.0 0.8 - 5.3 10e9/L    Absolute Monocytes 0.1 0.0 - 1.3 10e9/L    Absolute Eosinophils 0.0 0.0 - 0.7 10e9/L    Absolute Basophils 0.0 0.0 - 0.2 10e9/L    Abs Immature Granulocytes 0.0 0 - 0.4 10e9/L    Absolute Nucleated RBC 0.0     Platelet Estimate Confirming automated cell count    Comprehensive metabolic panel   Result Value Ref Range    Sodium 143 133 - 144 mmol/L    Potassium 3.8 3.4 - 5.3 mmol/L    Chloride 109 94 - 109 mmol/L    Carbon Dioxide 27 20 - 32 mmol/L    Anion Gap 8 3 - 14 mmol/L    Glucose 109 (H) 70 - 99 mg/dL    Urea Nitrogen 29 7 - 30 mg/dL    Creatinine 0.95 0.52 - 1.04 mg/dL    GFR Estimate 58 (L) >60 mL/min/1.7m2    GFR Estimate If Black 70 >60 mL/min/1.7m2    Calcium 8.8 8.5 - 10.1 mg/dL    Bilirubin Total 0.3 0.2 - 1.3 mg/dL    Albumin  3.3 (L) 3.4 - 5.0 g/dL    Protein Total 6.7 (L) 6.8 - 8.8 g/dL    Alkaline Phosphatase 122 40 - 150 U/L    ALT 33 0 - 50 U/L    AST 18 0 - 45 U/L   Magnesium   Result Value Ref Range    Magnesium 2.1 1.6 - 2.3 mg/dL   Phosphorus   Result Value Ref Range    Phosphorus 3.6 2.5 - 4.5 mg/dL   Lactic acid level STAT   Result Value Ref Range    Lactic Acid 3.5 (H) 0.7 - 2.1 mmol/L   Blood culture   Result Value Ref Range    Specimen Description Blood Right Hand     Culture Micro No growth after 6 hours     Micro Report Status Pending    Blood culture   Result Value Ref Range    Specimen Description Blood PICC     Culture Micro No growth after 6 hours     Micro Report Status Pending    XR Chest 1 View    Narrative    Exam:  XR CHEST 1 VW, 1/9/2017 10:48 PM    History: fever    Comparison:  Chest CT, 9/8/2016    Findings:  There is a left PICC projecting over the SVC with medial  deviation of the tip, likely in the azygos vein. The cardiac  silhouette and pulmonary vessels are within normal limits. Linear  opacities in lung bases likely represent scarring or atelectasis. The  lungs are otherwise clear no pleural effusion or pneumothorax.      Impression    Impression:    1.  No acute airspace abnormality.  2.  Left PICC projecting over the SVC with medial deviation of the  tip, likely toward/in the azygos vein    I have personally reviewed the examination and initial interpretation  and I agree with the findings.    HIRAM LEHMAN MD   UA with Microscopic reflex to Culture   Result Value Ref Range    Color Urine Light Yellow     Appearance Urine Clear     Glucose Urine Negative NEG mg/dL    Bilirubin Urine Negative NEG    Ketones Urine Negative NEG mg/dL    Specific Gravity Urine 1.015 1.003 - 1.035    Blood Urine Negative NEG    pH Urine 5.5 5.0 - 7.0 pH    Protein Albumin Urine Negative NEG mg/dL    Urobilinogen mg/dL Normal 0.0 - 2.0 mg/dL    Nitrite Urine Negative NEG    Leukocyte Esterase Urine Negative NEG    Source  Clean catch urine     WBC Urine <1 0 - 2 /HPF    RBC Urine 0 0 - 2 /HPF    Squamous Epithelial /HPF Urine <1 0 - 1 /HPF    Mucous Urine Present (A) NEG /LPF   Platelets prepare order unit conditional   Result Value Ref Range    Ordered Component Type PLT Pheresis     Units Ordered 1    Blood component   Result Value Ref Range    Unit Number [id number]     Blood Component Type PlateletPheresis,LeukoRed Irrad (Part 3)     Division Number 00     Status of Unit Released to care unit 01/10/2017 0217     Blood Product Code [id number]     Unit Status ISS    ABO/Rh type and screen   Result Value Ref Range    ABO A     RH(D)  Pos     Antibody Screen Neg     Test Valid Only At       [facility]    Specimen Expires 01/13/2017    Blood component   Result Value Ref Range    Unit Number [id number]     Blood Component Type Red Blood Cells LeukoReduced Irradiated     Division Number 00     Status of Unit Released to care unit 01/10/2017 0357     Blood Product Code [id number]     Unit Status ISS    CBC with platelets differential   Result Value Ref Range    WBC 0.4 (LL) 4.0 - 11.0 10e9/L    RBC Count 2.48 (L) 3.8 - 5.2 10e12/L    Hemoglobin 7.5 (L) 11.7 - 15.7 g/dL    Hematocrit 22.9 (L) 35.0 - 47.0 %    MCV 92 78 - 100 fl    MCH 30.2 26.5 - 33.0 pg    MCHC 32.8 31.5 - 36.5 g/dL    RDW 17.9 (H) 10.0 - 15.0 %    Platelet Count 7 (LL) 150 - 450 10e9/L    Diff Method WBC <0.5, Diff not done    Basic metabolic panel   Result Value Ref Range    Sodium 142 133 - 144 mmol/L    Potassium 4.1 3.4 - 5.3 mmol/L    Chloride 111 (H) 94 - 109 mmol/L    Carbon Dioxide 22 20 - 32 mmol/L    Anion Gap 9 3 - 14 mmol/L    Glucose 160 (H) 70 - 99 mg/dL    Urea Nitrogen 27 7 - 30 mg/dL    Creatinine 0.79 0.52 - 1.04 mg/dL    GFR Estimate 72 >60 mL/min/1.7m2    GFR Estimate If Black 87 >60 mL/min/1.7m2    Calcium 7.9 (L) 8.5 - 10.1 mg/dL   Platelets prepare order unit conditional   Result Value Ref Range     Ordered Component Type PLT Pheresis     Units Ordered 1    Blood component   Result Value Ref Range    Unit Number [id number]     Blood Component Type PlateletPheresis,LeukoRed Irrad (Part 2)     Division Number 00     Status of Unit Released to care unit 01/10/2017 0847     Blood Product Code [id number]     Unit Status ISS        Staff Addendum: Patient was seen and examined by me. All  labs, VS and pertinent images were reviewed with the team on rounds. Plans for care were mutually developed and outlined above with my direct input.    Interval Events/ROS: Fever overnight and mild hypoxia. Seems resolved this Am and pt has no new localizing s/s. No new bleeding. Breathing feels fine. Rest as above.      Exam: NAD/AAO, cooperative with exam. OP and lungs were clear, HRRR, Abd soft/NT/ND, no HSM or masses, No LE edema, Nro grossly non-focal, Skin exam was without rash. Few scattered bruised noted on extrems.     A/P: 72 yo F with idiopathic AMELIA here for immunosuppressive therapy; day 2 of ATG/MP/CSA  - Will give plts again today.   - Will continue Cefepime for 24 hours more until culture prelims are noted. D/C vanc as I do not feel it is needed at this time.   - Awaiting CMV PCRs; CSA level will be tomorrow.     - Further per clinical course.     Merritt Murray, DO

## 2017-01-11 LAB
ABO + RH BLD: NORMAL
ABO + RH BLD: NORMAL
ANION GAP SERPL CALCULATED.3IONS-SCNC: 10 MMOL/L (ref 3–14)
BASOPHILS # BLD AUTO: 0 10E9/L (ref 0–0.2)
BASOPHILS NFR BLD AUTO: 0 %
BLD GP AB SCN SERPL QL: NORMAL
BLD PROD TYP BPU: NORMAL
BLOOD BANK CMNT PATIENT-IMP: NORMAL
BUN SERPL-MCNC: 21 MG/DL (ref 7–30)
CALCIUM SERPL-MCNC: 8.1 MG/DL (ref 8.5–10.1)
CHLORIDE SERPL-SCNC: 114 MMOL/L (ref 94–109)
CO2 SERPL-SCNC: 20 MMOL/L (ref 20–32)
CREAT SERPL-MCNC: 0.63 MG/DL (ref 0.52–1.04)
CYCLOSPORINE BLD LC/MS/MS-MCNC: 135 UG/L (ref 50–400)
DIFFERENTIAL METHOD BLD: ABNORMAL
EOSINOPHIL # BLD AUTO: 0 10E9/L (ref 0–0.7)
EOSINOPHIL NFR BLD AUTO: 0 %
ERYTHROCYTE [DISTWIDTH] IN BLOOD BY AUTOMATED COUNT: 19.2 % (ref 10–15)
GFR SERPL CREATININE-BSD FRML MDRD: ABNORMAL ML/MIN/1.7M2
GLUCOSE SERPL-MCNC: 143 MG/DL (ref 70–99)
HCT VFR BLD AUTO: 21.7 % (ref 35–47)
HGB BLD-MCNC: 7.1 G/DL (ref 11.7–15.7)
LYMPHOCYTES # BLD AUTO: 0.1 10E9/L (ref 0.8–5.3)
LYMPHOCYTES NFR BLD AUTO: 19.9 %
MCH RBC QN AUTO: 30 PG (ref 26.5–33)
MCHC RBC AUTO-ENTMCNC: 32.7 G/DL (ref 31.5–36.5)
MCV RBC AUTO: 92 FL (ref 78–100)
METAMYELOCYTES # BLD: 0 10E9/L
METAMYELOCYTES NFR BLD MANUAL: 1.3 %
MONOCYTES # BLD AUTO: 0 10E9/L (ref 0–1.3)
MONOCYTES NFR BLD AUTO: 3.6 %
NEUTROPHILS # BLD AUTO: 0.5 10E9/L (ref 1.6–8.3)
NEUTROPHILS NFR BLD AUTO: 75.2 %
NRBC # BLD AUTO: 0 10*3/UL
NRBC BLD AUTO-RTO: 4 /100
NUM BPU REQUESTED: 1
PLATELET # BLD AUTO: 14 10E9/L (ref 150–450)
PLATELET # BLD AUTO: 24 10E9/L (ref 150–450)
POTASSIUM SERPL-SCNC: 4.6 MMOL/L (ref 3.4–5.3)
RBC # BLD AUTO: 2.37 10E12/L (ref 3.8–5.2)
SODIUM SERPL-SCNC: 143 MMOL/L (ref 133–144)
SPECIMEN EXP DATE BLD: NORMAL
TME LAST DOSE: NORMAL H
WBC # BLD AUTO: 0.6 10E9/L (ref 4–11)

## 2017-01-11 PROCEDURE — 25000125 ZZHC RX 250: Performed by: PHYSICIAN ASSISTANT

## 2017-01-11 PROCEDURE — 25000130 H RX MED GY IP 250 OP 259 PS 637: Performed by: INTERNAL MEDICINE

## 2017-01-11 PROCEDURE — 25000128 H RX IP 250 OP 636: Performed by: INTERNAL MEDICINE

## 2017-01-11 PROCEDURE — 36592 COLLECT BLOOD FROM PICC: CPT | Performed by: INTERNAL MEDICINE

## 2017-01-11 PROCEDURE — 40000802 ZZH SITE CHECK

## 2017-01-11 PROCEDURE — 25000132 ZZH RX MED GY IP 250 OP 250 PS 637: Performed by: INTERNAL MEDICINE

## 2017-01-11 PROCEDURE — 12000008 ZZH R&B INTERMEDIATE UMMC

## 2017-01-11 PROCEDURE — 25000130 H RX MED GY IP 250 OP 259 PS 637: Performed by: PHYSICIAN ASSISTANT

## 2017-01-11 PROCEDURE — 85025 COMPLETE CBC W/AUTO DIFF WBC: CPT | Performed by: PHYSICIAN ASSISTANT

## 2017-01-11 PROCEDURE — 25000125 ZZHC RX 250: Performed by: INTERNAL MEDICINE

## 2017-01-11 PROCEDURE — 80158 DRUG ASSAY CYCLOSPORINE: CPT | Performed by: INTERNAL MEDICINE

## 2017-01-11 PROCEDURE — 25000132 ZZH RX MED GY IP 250 OP 250 PS 637: Performed by: PHYSICIAN ASSISTANT

## 2017-01-11 PROCEDURE — 80048 BASIC METABOLIC PNL TOTAL CA: CPT | Performed by: PHYSICIAN ASSISTANT

## 2017-01-11 PROCEDURE — 36592 COLLECT BLOOD FROM PICC: CPT | Performed by: PHYSICIAN ASSISTANT

## 2017-01-11 PROCEDURE — 85049 AUTOMATED PLATELET COUNT: CPT | Performed by: PHYSICIAN ASSISTANT

## 2017-01-11 RX ORDER — LEVOFLOXACIN 500 MG/1
500 TABLET, FILM COATED ORAL DAILY
Status: DISCONTINUED | OUTPATIENT
Start: 2017-01-11 | End: 2017-01-13 | Stop reason: HOSPADM

## 2017-01-11 RX ORDER — NALOXONE HYDROCHLORIDE 0.4 MG/ML
.1-.4 INJECTION, SOLUTION INTRAMUSCULAR; INTRAVENOUS; SUBCUTANEOUS
Status: DISCONTINUED | OUTPATIENT
Start: 2017-01-11 | End: 2017-01-13 | Stop reason: HOSPADM

## 2017-01-11 RX ORDER — OXYCODONE HYDROCHLORIDE 5 MG/1
5 TABLET ORAL EVERY 4 HOURS PRN
Status: DISCONTINUED | OUTPATIENT
Start: 2017-01-11 | End: 2017-01-13 | Stop reason: HOSPADM

## 2017-01-11 RX ORDER — GUAIFENESIN 600 MG/1
600 TABLET, EXTENDED RELEASE ORAL 2 TIMES DAILY PRN
Status: DISCONTINUED | OUTPATIENT
Start: 2017-01-11 | End: 2017-01-13 | Stop reason: HOSPADM

## 2017-01-11 RX ORDER — HYDRALAZINE HYDROCHLORIDE 20 MG/ML
10 INJECTION INTRAMUSCULAR; INTRAVENOUS EVERY 6 HOURS PRN
Status: DISCONTINUED | OUTPATIENT
Start: 2017-01-11 | End: 2017-01-13 | Stop reason: HOSPADM

## 2017-01-11 RX ADMIN — PANTOPRAZOLE SODIUM 40 MG: 40 TABLET, DELAYED RELEASE ORAL at 09:01

## 2017-01-11 RX ADMIN — SENNOSIDES AND DOCUSATE SODIUM 2 TABLET: 8.6; 5 TABLET ORAL at 20:26

## 2017-01-11 RX ADMIN — CEFEPIME 2 G: 2 INJECTION, POWDER, FOR SOLUTION INTRAMUSCULAR; INTRAVENOUS at 08:59

## 2017-01-11 RX ADMIN — ACETAMINOPHEN 650 MG: 325 TABLET, FILM COATED ORAL at 11:12

## 2017-01-11 RX ADMIN — OXYCODONE HYDROCHLORIDE 5 MG: 5 TABLET ORAL at 23:55

## 2017-01-11 RX ADMIN — DIPHENHYDRAMINE HYDROCHLORIDE 25 MG: 25 CAPSULE ORAL at 20:26

## 2017-01-11 RX ADMIN — CEFEPIME 2 G: 2 INJECTION, POWDER, FOR SOLUTION INTRAMUSCULAR; INTRAVENOUS at 00:34

## 2017-01-11 RX ADMIN — ACETAMINOPHEN 650 MG: 325 TABLET, FILM COATED ORAL at 12:54

## 2017-01-11 RX ADMIN — DICLOFENAC SODIUM 2 G: 10 GEL TOPICAL at 23:59

## 2017-01-11 RX ADMIN — DIPHENHYDRAMINE HYDROCHLORIDE 25 MG: 25 CAPSULE ORAL at 12:54

## 2017-01-11 RX ADMIN — OXYCODONE HYDROCHLORIDE 5 MG: 5 TABLET ORAL at 20:26

## 2017-01-11 RX ADMIN — SENNOSIDES AND DOCUSATE SODIUM 1 TABLET: 8.6; 5 TABLET ORAL at 09:01

## 2017-01-11 RX ADMIN — HYDRALAZINE HYDROCHLORIDE 10 MG: 20 INJECTION INTRAMUSCULAR; INTRAVENOUS at 20:26

## 2017-01-11 RX ADMIN — METHYLPREDNISOLONE SODIUM SUCCINATE 31 MG: 40 INJECTION, POWDER, LYOPHILIZED, FOR SOLUTION INTRAMUSCULAR; INTRAVENOUS at 00:34

## 2017-01-11 RX ADMIN — ELTROMBOPAG OLAMINE 50 MG: 25 TABLET, FILM COATED ORAL at 09:01

## 2017-01-11 RX ADMIN — FLUCONAZOLE 200 MG: 200 TABLET ORAL at 09:01

## 2017-01-11 RX ADMIN — METHYLPREDNISOLONE SODIUM SUCCINATE 2500 MG: 125 INJECTION, POWDER, LYOPHILIZED, FOR SOLUTION INTRAMUSCULAR; INTRAVENOUS at 13:24

## 2017-01-11 RX ADMIN — CYCLOSPORINE 250 MG: 100 CAPSULE ORAL at 20:26

## 2017-01-11 RX ADMIN — CYCLOSPORINE 200 MG: 100 CAPSULE ORAL at 09:01

## 2017-01-11 RX ADMIN — LEVOFLOXACIN 500 MG: 500 TABLET, FILM COATED ORAL at 12:54

## 2017-01-11 ASSESSMENT — PAIN DESCRIPTION - DESCRIPTORS
DESCRIPTORS: ACHING
DESCRIPTORS: HEADACHE

## 2017-01-11 NOTE — PROGRESS NOTES
[facility], [place]    Hematology / Oncology Progress Note    Date of Service (when I saw the patient): 01/11/2017     Assessment and Plan  Bonny Raymundo is a 71 year old female with idiopathic aplastic anemia, PMH of provoked DVT x 2 and PE who presents with to start treatment with ATG, cyclosporine, eltrombopag and steroids.     HEME:  #Aplastic anemia: pancytopenia. First found on CBC 9/16 with symptoms of SOB. Primary hematologist is Dr. Rogel. Admitted to start treatment. PICC line placed prior to admission.   - Transfuse prn for hgb <7 and plt <10k    REGIMEN: Day 1= 1/9/17; Today is D2  ATG 2500 mg (40 mg/kg0 q24 hours D-4  Cyclosporine 200 mg (3 mg/kg) PO bid; cyclosporine level came back low (135) today, per pharmacy increase dose to 250 mg daily  Eltrombopag 50 mg daily  Methylpred 31 mg (0.5 mg/kg) q24 hours D1-4  Prednisone 60 mg (1 mg/kg) daily after completion of IV methylpred to continue for at least 2 weeks    ID:  #PPX: continue PTA fluconazole 200 mg daily, levaquin 500 mg daily     #Neutropenic fever: Spiked a fever shortly after receiving her first dose of ATG. LA was 3.5. CXR no evidence of infection. UA negative. BC NGTD. She was given vanc and cefepime. No new symptoms of infection. This is likely a reaction to ATG.   - D/c cefepime as she has been afebrile >24 hours and BC NGTD. Resume ppx levaquin    CV:  #Hx DVT: 2 episodes of provoked DVTs in 2012 and 2014 (travel related) with associated PE in 2012. Treated in the past with warfarin and lovenox. Transitioned to ASA which was stopped in the setting of thrombocytopenia    #Hypertensive: BP significantly elevated to 212/85. Improved with hydralazine. Likely from ATG, steroids or both. Normotensive this AM  - Continue hydralazine prn  - BID plt checks due to increased risk of stroke with very high BP    HEENT:  #Allergies: Continue prn claritin and benadryl qhs    PULM:  #Hypoxia: she was mildly hypoxic (89%)  while febrile last night. Also likely due to ATG. This AM 98% on RA.     FEN:  Regular diet  Electrolyte replacement prn per protocol  On CXR PICC was into the azygos, paged vascular access to try to reposition it    DVT Prophylaxis: VTE Prophylaxis contraindicated due to thrombocytopenia  Code Status: Full Code    Disposition: Expected discharge in 3 days once ATG is completed if she is tolerating it well.    Susan Root PA-C  Hematology/Oncology    Interval History  She states that she is feeling fine. Headache is very mild this AM and typical of headaches she gets frequently at home. No rashes. No n/v/d. No abd pain. No sinus pain. Mild headaches. No SOB, CP or cough. No dysuria.     Physical Exam  Temp: 97.8  F (36.6  C) Temp src: Oral BP: 162/89 mmHg Pulse: 65   Resp: 18 SpO2: 94 % O2 Device: None (Room air)    Filed Vitals:    01/09/17 1012 01/10/17 0923 01/11/17 1020   Weight: 62.46 kg (137 lb 11.2 oz) 63.231 kg (139 lb 6.4 oz) 65.772 kg (145 lb)     Vital Signs with Ranges  Temp:  [96.5  F (35.8  C)-99  F (37.2  C)] 97.8  F (36.6  C)  Pulse:  [65-87] 65  Resp:  [16-18] 18  BP: (143-212)/(68-89) 162/89 mmHg  SpO2:  [92 %-98 %] 94 %  I/O last 3 completed shifts:  In: 3027 [P.O.:1200; I.V.:1345]  Out: 1650 [Urine:1650]    Constitutional: Well appearing, in no acute distress  Eyes: sclera anicteric, conjunctiva clear  HEENT: normocephalic, atraumatic, moist mucus membranes, no thrush or mucositis  Respiratory: clear to auscultation bilaterally  Cardiovascular: RRR, S1S2, no m/r/g  GI: soft, nondistended, nontender  Genitourinary: deferred  Skin: No visible rashes, PICC in place  Musculoskeletal: Trace LLE edema, no RLE edema, no swollen or erythematous joints  Neurologic: alert and oriented x 3, no focal deficits  Psychiatric: appropriate affect    Medications    - MEDICATION INSTRUCTIONS -        -INFUSION HYPERSENSITIVITY-         levofloxacin  500 mg Oral Daily     senna-docusate  1-2 tablet Oral  BID     sodium chloride (PF)  10 mL Intracatheter Q7 Days     fluconazole  200 mg Oral Daily     diphenhydrAMINE  25 mg Oral At Bedtime     diclofenac  2 g Topical BID     acetaminophen  650 mg Oral Q24H     diphenhydrAMINE  25 mg Oral Q24H     eltrombopag  50 mg Oral Daily     anti-thymo immune glob (ATGAM - equine) intermittent infusion  40 mg/kg (Treatment Plan Actual) Intravenous Q24H     cycloSPORINE modified  3 mg/kg (Treatment Plan Actual) Oral BID IS     methylPREDNISolone  0.5 mg/kg (Treatment Plan Actual) Intravenous Q24H     [START ON 1/13/2017] predniSONE  1 mg/kg (Treatment Plan Actual) Oral Daily     pantoprazole  40 mg Oral QAM       Data  Results for orders placed or performed during the hospital encounter of 01/09/17 (from the past 24 hour(s))   CBC with platelets differential   Result Value Ref Range    WBC 0.6 (LL) 4.0 - 11.0 10e9/L    RBC Count 2.37 (L) 3.8 - 5.2 10e12/L    Hemoglobin 7.1 (L) 11.7 - 15.7 g/dL    Hematocrit 21.7 (L) 35.0 - 47.0 %    MCV 92 78 - 100 fl    MCH 30.0 26.5 - 33.0 pg    MCHC 32.7 31.5 - 36.5 g/dL    RDW 19.2 (H) 10.0 - 15.0 %    Platelet Count 24 (LL) 150 - 450 10e9/L    Diff Method Manual Differential     % Neutrophils 75.2 %    % Lymphocytes 19.9 %    % Monocytes 3.6 %    % Eosinophils 0.0 %    % Basophils 0.0 %    % Metamyelocytes 1.3 %    Nucleated RBCs 4 (H) 0 /100    Absolute Neutrophil 0.5 (L) 1.6 - 8.3 10e9/L    Absolute Lymphocytes 0.1 (L) 0.8 - 5.3 10e9/L    Absolute Monocytes 0.0 0.0 - 1.3 10e9/L    Absolute Eosinophils 0.0 0.0 - 0.7 10e9/L    Absolute Basophils 0.0 0.0 - 0.2 10e9/L    Absolute Metamyelocytes 0.0 0 10e9/L    Absolute Nucleated RBC 0.0    Basic metabolic panel   Result Value Ref Range    Sodium 143 133 - 144 mmol/L    Potassium 4.6 3.4 - 5.3 mmol/L    Chloride 114 (H) 94 - 109 mmol/L    Carbon Dioxide 20 20 - 32 mmol/L    Anion Gap 10 3 - 14 mmol/L    Glucose 143 (H) 70 - 99 mg/dL    Urea Nitrogen 21 7 - 30 mg/dL    Creatinine 0.63 0.52 - 1.04  mg/dL    GFR Estimate >90  Non  GFR Calc   >60 mL/min/1.7m2    GFR Estimate If Black >90   GFR Calc   >60 mL/min/1.7m2    Calcium 8.1 (L) 8.5 - 10.1 mg/dL       Staff Addendum: Patient was seen and examined by me. All  labs, VS and pertinent images were reviewed with the team on rounds. Plans for care were mutually developed and outlined above with my direct input.    Interval Events/ROS: No new fevers but was hypertensive with ATG last night with mild HA. Required stopping infusion and treatment prior to resumption.  Overall no other major changes today. Rest as above.      Exam: NAD/AAO, cooperative with exam. OP and lungs were clear, HRRR, Abd soft/NT/ND, no HSM or masses, No LE edema, Nro grossly non-focal, Skin exam was without rash. Few scattered bruised noted on extrems.     A/P: 70 yo F with idiopathic AMELIA here for immunosuppressive therapy; day 3 of ATG/MP/CSA  - No transfusions today but will assess plts BID given low counts and higher BPs.    - OK to d/c  Cefepime- Awaiting CMV PCR; CSA level noted.     - Further per clinical course.     Merritt Murray DO

## 2017-01-11 NOTE — PROGRESS NOTES
VSS. Afebrile. BMx2 today, senna held. Premeds given and dose #2 of ATG hung at 1400, pt tolerating well so far. Spoke with pharmacist and plan to infuse over 6 hours and not specified 4 d/t fever and 1 episode of hypotension yesterday evening (possible reaction?). Currently infusing at 150/hour and will increase to 180/hour if pt continues to tolerate after initial 60 minutes. Pt up ambulating halls this morning, took a shower. Eating well. Small petechiae noted on chin. No other new spots. Received 1 unit plt for 7K. Continue to monitor.

## 2017-01-11 NOTE — PLAN OF CARE
Problem: Goal Outcome Summary  Goal: Goal Outcome Summary  Outcome: No Change  Alert and oriented x4. Reports mild HA, reports Floricet and Tylenol minimally helps with the pain, MD notified, Oxycodone PO 5mg x1 given, some relief. Dose #2 ATG infused, after 3 hours, HTN, max /85, difficult for BP machine to measure, used manual BP cuff, /82, stopped ATG, MD notified, Hydralazine IV given, BP recheck 152/68, ATG restarted at 150 ml/hr, titrated back up to initiate rate of 180ml/hr. ATG completed at 2220. Pt reports a mild SOB, reports improve with rest, sating 92-98% on RA. Independent, steady up ad derick. Small BM x1, voided appropriately. Continue with POC.

## 2017-01-11 NOTE — PLAN OF CARE
Problem: Goal Outcome Summary  Goal: Goal Outcome Summary  Outcome: No Change    VSS. IVF d/c'd. Pt received tylenol for slight headache late this morning. Ate a large, late breakfast so declined lunch and had a snack instead. Pre-medications for ATG given. Will infuse ATG over 150/hour to prevent reaction. Pt up ambulating the halls. Continue to monitor.

## 2017-01-11 NOTE — PLAN OF CARE
Problem: Goal Outcome Summary  Goal: Goal Outcome Summary  Outcome: No Change    AVSS, afebrile. Denies pain, nausea, SOB. Voiding well. Cont POC.

## 2017-01-12 ENCOUNTER — DOCUMENTATION ONLY (OUTPATIENT)
Dept: PHARMACY | Facility: CLINIC | Age: 72
End: 2017-01-12

## 2017-01-12 LAB
ANION GAP SERPL CALCULATED.3IONS-SCNC: 10 MMOL/L (ref 3–14)
ANISOCYTOSIS BLD QL SMEAR: ABNORMAL
BASOPHILS # BLD AUTO: 0 10E9/L (ref 0–0.2)
BASOPHILS NFR BLD AUTO: 0 %
BLD PROD TYP BPU: NORMAL
BLD PROD TYP BPU: NORMAL
BLD UNIT ID BPU: 0
BLOOD PRODUCT CODE: NORMAL
BPU ID: NORMAL
BUN SERPL-MCNC: 25 MG/DL (ref 7–30)
CALCIUM SERPL-MCNC: 8.2 MG/DL (ref 8.5–10.1)
CHLORIDE SERPL-SCNC: 110 MMOL/L (ref 94–109)
CMV DNA SPEC NAA+PROBE-ACNC: NORMAL [IU]/ML
CMV DNA SPEC NAA+PROBE-LOG#: NORMAL {LOG_IU}/ML
CO2 SERPL-SCNC: 22 MMOL/L (ref 20–32)
CREAT SERPL-MCNC: 0.69 MG/DL (ref 0.52–1.04)
DIFFERENTIAL METHOD BLD: ABNORMAL
EOSINOPHIL # BLD AUTO: 0 10E9/L (ref 0–0.7)
EOSINOPHIL NFR BLD AUTO: 0 %
ERYTHROCYTE [DISTWIDTH] IN BLOOD BY AUTOMATED COUNT: 19.2 % (ref 10–15)
GFR SERPL CREATININE-BSD FRML MDRD: 84 ML/MIN/1.7M2
GLUCOSE SERPL-MCNC: 147 MG/DL (ref 70–99)
HCT VFR BLD AUTO: 21.2 % (ref 35–47)
HGB BLD-MCNC: 7.1 G/DL (ref 11.7–15.7)
LYMPHOCYTES # BLD AUTO: 0.1 10E9/L (ref 0.8–5.3)
LYMPHOCYTES NFR BLD AUTO: 17.5 %
MAGNESIUM SERPL-MCNC: 1.9 MG/DL (ref 1.6–2.3)
MCH RBC QN AUTO: 30.3 PG (ref 26.5–33)
MCHC RBC AUTO-ENTMCNC: 33.5 G/DL (ref 31.5–36.5)
MCV RBC AUTO: 91 FL (ref 78–100)
MONOCYTES # BLD AUTO: 0 10E9/L (ref 0–1.3)
MONOCYTES NFR BLD AUTO: 4.3 %
NEUTROPHILS # BLD AUTO: 0.5 10E9/L (ref 1.6–8.3)
NEUTROPHILS NFR BLD AUTO: 78.2 %
NRBC # BLD AUTO: 0.1 10*3/UL
NRBC BLD AUTO-RTO: 9 /100
NUM BPU REQUESTED: 1
PHOSPHATE SERPL-MCNC: 2.8 MG/DL (ref 2.5–4.5)
PLATELET # BLD AUTO: 10 10E9/L (ref 150–450)
PLATELET # BLD AUTO: 14 10E9/L (ref 150–450)
POTASSIUM SERPL-SCNC: 4.1 MMOL/L (ref 3.4–5.3)
RBC # BLD AUTO: 2.34 10E12/L (ref 3.8–5.2)
SODIUM SERPL-SCNC: 141 MMOL/L (ref 133–144)
SPECIMEN SOURCE: NORMAL
TRANSFUSION STATUS PATIENT QL: NORMAL
TRANSFUSION STATUS PATIENT QL: NORMAL
WBC # BLD AUTO: 0.7 10E9/L (ref 4–11)

## 2017-01-12 PROCEDURE — 36592 COLLECT BLOOD FROM PICC: CPT | Performed by: PHYSICIAN ASSISTANT

## 2017-01-12 PROCEDURE — 85049 AUTOMATED PLATELET COUNT: CPT | Performed by: PHYSICIAN ASSISTANT

## 2017-01-12 PROCEDURE — 40000802 ZZH SITE CHECK

## 2017-01-12 PROCEDURE — P9037 PLATE PHERES LEUKOREDU IRRAD: HCPCS | Performed by: PHYSICIAN ASSISTANT

## 2017-01-12 PROCEDURE — 25000130 H RX MED GY IP 250 OP 259 PS 637: Performed by: INTERNAL MEDICINE

## 2017-01-12 PROCEDURE — 85025 COMPLETE CBC W/AUTO DIFF WBC: CPT | Performed by: PHYSICIAN ASSISTANT

## 2017-01-12 PROCEDURE — 25000125 ZZHC RX 250: Performed by: PHYSICIAN ASSISTANT

## 2017-01-12 PROCEDURE — 12000008 ZZH R&B INTERMEDIATE UMMC

## 2017-01-12 PROCEDURE — 84100 ASSAY OF PHOSPHORUS: CPT | Performed by: PHYSICIAN ASSISTANT

## 2017-01-12 PROCEDURE — 80048 BASIC METABOLIC PNL TOTAL CA: CPT | Performed by: PHYSICIAN ASSISTANT

## 2017-01-12 PROCEDURE — 25000125 ZZHC RX 250: Performed by: INTERNAL MEDICINE

## 2017-01-12 PROCEDURE — 25000128 H RX IP 250 OP 636: Performed by: INTERNAL MEDICINE

## 2017-01-12 PROCEDURE — 83735 ASSAY OF MAGNESIUM: CPT | Performed by: PHYSICIAN ASSISTANT

## 2017-01-12 PROCEDURE — 25000130 H RX MED GY IP 250 OP 259 PS 637: Performed by: PHYSICIAN ASSISTANT

## 2017-01-12 PROCEDURE — 25000132 ZZH RX MED GY IP 250 OP 250 PS 637: Performed by: PHYSICIAN ASSISTANT

## 2017-01-12 PROCEDURE — 25000132 ZZH RX MED GY IP 250 OP 250 PS 637: Performed by: INTERNAL MEDICINE

## 2017-01-12 RX ORDER — FUROSEMIDE 10 MG/ML
20 INJECTION INTRAMUSCULAR; INTRAVENOUS ONCE
Status: COMPLETED | OUTPATIENT
Start: 2017-01-12 | End: 2017-01-12

## 2017-01-12 RX ADMIN — ELTROMBOPAG OLAMINE 50 MG: 25 TABLET, FILM COATED ORAL at 08:02

## 2017-01-12 RX ADMIN — SENNOSIDES AND DOCUSATE SODIUM 1 TABLET: 8.6; 5 TABLET ORAL at 08:04

## 2017-01-12 RX ADMIN — FLUCONAZOLE 200 MG: 200 TABLET ORAL at 08:01

## 2017-01-12 RX ADMIN — DIPHENHYDRAMINE HYDROCHLORIDE 25 MG: 25 CAPSULE ORAL at 20:34

## 2017-01-12 RX ADMIN — OXYCODONE HYDROCHLORIDE 5 MG: 5 TABLET ORAL at 21:45

## 2017-01-12 RX ADMIN — METHYLPREDNISOLONE SODIUM SUCCINATE 31 MG: 40 INJECTION, POWDER, LYOPHILIZED, FOR SOLUTION INTRAMUSCULAR; INTRAVENOUS at 00:16

## 2017-01-12 RX ADMIN — METHYLPREDNISOLONE SODIUM SUCCINATE 2500 MG: 125 INJECTION, POWDER, LYOPHILIZED, FOR SOLUTION INTRAMUSCULAR; INTRAVENOUS at 12:19

## 2017-01-12 RX ADMIN — OXYCODONE HYDROCHLORIDE 5 MG: 5 TABLET ORAL at 09:35

## 2017-01-12 RX ADMIN — PANTOPRAZOLE SODIUM 40 MG: 40 TABLET, DELAYED RELEASE ORAL at 08:01

## 2017-01-12 RX ADMIN — HYDRALAZINE HYDROCHLORIDE 10 MG: 20 INJECTION INTRAMUSCULAR; INTRAVENOUS at 08:28

## 2017-01-12 RX ADMIN — ACETAMINOPHEN 650 MG: 325 TABLET, FILM COATED ORAL at 11:13

## 2017-01-12 RX ADMIN — SENNOSIDES AND DOCUSATE SODIUM 2 TABLET: 8.6; 5 TABLET ORAL at 20:34

## 2017-01-12 RX ADMIN — FUROSEMIDE 20 MG: 10 INJECTION, SOLUTION INTRAVENOUS at 11:13

## 2017-01-12 RX ADMIN — CYCLOSPORINE 250 MG: 100 CAPSULE ORAL at 08:02

## 2017-01-12 RX ADMIN — DIPHENHYDRAMINE HYDROCHLORIDE 25 MG: 25 CAPSULE ORAL at 11:13

## 2017-01-12 RX ADMIN — CYCLOSPORINE 250 MG: 100 CAPSULE ORAL at 20:34

## 2017-01-12 RX ADMIN — LEVOFLOXACIN 500 MG: 500 TABLET, FILM COATED ORAL at 08:01

## 2017-01-12 ASSESSMENT — PAIN DESCRIPTION - DESCRIPTORS
DESCRIPTORS: HEADACHE
DESCRIPTORS: ACHING
DESCRIPTORS: HEADACHE

## 2017-01-12 NOTE — PROGRESS NOTES
Prior Authorization Requests    Cyclosporine 100 and 25 mg capsules  Qty: 120 and 60  Day Supply: 30  Diagnosis: Pancytopenia (H) (D61.818); Idiopathic aplastic anemia (H) (D61.3)  Expected copay: 82  Effective Dates: 12/13/2016 - 01/12/2020  Case Number: [id number]  Status: Approved    Promacta  Qty: 30  Day Supply: 30  Diagnosis: Pancytopenia (H) (D61.818); Idiopathic aplastic anemia (H) (D61.3)  Expected copay: 2702.62*  Effective Dates: 12/13/2016 - 01/12/2020  Case Number: [id number]   Status: Approved    Insurance: [facility]  Phone: [phone]  ID: [id number]  Submitted via: [facility]      *Note: Spoke with pt regarding high copay due to Medicare coverage gap. Applied for [employer] Patient Assistance Program. Pt was pre-approved for free Promacta 30-day shipment from [employer] . Per [employer] representative this will likely arrive Tuesday. Requested pt to provide proof of income to complete patient enrollment form.                     Laisha Mcelroy  Pharmacy Liaison  Cell: [phone] Page: [phone]

## 2017-01-12 NOTE — PLAN OF CARE
Problem: Goal Outcome Summary  Goal: Goal Outcome Summary  Outcome: No Change  Neutropenic precautions. Afebrile. SOB w/exertion. Oxy x 1 for back pain. VSS except for elevated blood pressure, but did not meet parameters for IV hydralazine. L) PICC x 2, saline locked. Independent. Good UOP. Today is Dose #4 of ATG. Will continue w/POC.

## 2017-01-12 NOTE — PLAN OF CARE
Problem: Goal Outcome Summary  Goal: Goal Outcome Summary  Outcome: No Change    HTN again today, hydralazine received x1 with some decrease in BP (see flow sheet).  Elevated again before ATG administered but too soon for another dose of hydralazine. Pt appears to be more SOB than previous days. Pt reports SOB with eating/drinking/talking/exeration. LS fine crackles in bases which is new from previous days. Wt up 8lbs. NP notified. Lasix 20mg IV x1 ordered and given. About 1L urine output since administration. 1 unit of platelets received for count of 10k, tolerated well. Pt feeling more fatigued today and concerned about d/c too soon and being unable to care for her self. Emotional support provided. Dose #4 ATG infusing, tolerating well so far. Oxycodone 5mg PO given x1 for headache with good relief. Continue to monitor.

## 2017-01-12 NOTE — PROGRESS NOTES
[facility] [place]    Hematology / Oncology Progress Note    Date of Service (when I saw the patient): 01/12/2017     Assessment and Plan  Bonny Raymundo is a 71 year old female with idiopathic aplastic anemia, PMH of provoked DVT x 2 and PE who presents with to start treatment with ATG, cyclosporine, eltrombopag and steroids.     HEME:  #Aplastic anemia: pancytopenia. First found on CBC 9/16 with symptoms of SOB. Primary hematologist is Dr. Rogel. Admitted to start treatment. PICC line placed prior to admission.   - Transfuse prn for hgb <7 and plt <10k    REGIMEN: Day 1= 1/9/17; Today is D4  ATG 2500 mg (40 mg/kg0 q24 hours D-4  Cyclosporine 200 mg (3 mg/kg) PO bid; cyclosporine level came back low (135) on 1/11, per pharmacy increased dose to 250 mg daily. Repeat level tomorrow (1/13).   Eltrombopag 50 mg daily  Methylpred 31 mg (0.5 mg/kg) q24 hours D1-4  Prednisone 60 mg (1 mg/kg) daily after completion of IV methylpred to continue for at least 2 weeks    ID:  #PPX: continue PTA fluconazole 200 mg daily, levaquin 500 mg daily     #Neutropenic fever: Spiked a fever shortly after receiving her first dose of ATG. LA was 3.5. CXR no evidence of infection. UA negative. BC NGTD. She was given vanc and cefepime. No new symptoms of infection. This is likely a reaction to ATG.   - D/c cefepime on 1/11 as she has been afebrile >24 hours and BC NGTD. Resume ppx levaquin    CV:  #Hx DVT: 2 episodes of provoked DVTs in 2012 and 2014 (travel related) with associated PE in 2012. Treated in the past with warfarin and lovenox. Transitioned to ASA which was stopped in the setting of thrombocytopenia    #Hypertensive: BP significantly elevated to 212/85. Improved with hydralazine. Continues to be elevated but not as significantly. Likely from ATG, steroids or cyclosporine   - Continue hydralazine prn  - BID plt checks due to increased risk of stroke with very high BP  - Consider norvasc if she  remains hypertensive  - Lasix today     #Fluid overload: Her weight is elevated 3 kg from admission. Mild crackles at the bases of her lungs. Received IV fluid when febrile and transfusions  - Lasix 20 mg IV today    HEENT:  #Allergies: Continue prn claritin and benadryl qhs    PULM:  #Hypoxia: she was mildly hypoxic (89%) while febrile 1/10. Also likely due to ATG. This AM 94% on RA.     FEN:  Regular diet  Electrolyte replacement prn per protocol  On CXR PICC was into the azygos, paged vascular access to try to reposition it on 1/10    DVT Prophylaxis: VTE Prophylaxis contraindicated due to thrombocytopenia  Code Status: Full Code    Disposition: Expected discharge in tomorrow once ATG is completed if she is tolerating it well.    Susan Root PA-C  Hematology/Oncology    Interval History  She states that she is feeling tired. Headache is very mild this AM and typical of headaches she gets frequently at home. No rashes. No n/v/d. No abd pain. No sinus pain. SOB is unchanged. No CP or cough. No dysuria.     Physical Exam  Temp: 98  F (36.7  C) Temp src: Oral BP: 162/83 mmHg Pulse: 64 Heart Rate: 74 Resp: 20 SpO2: 94 % O2 Device: None (Room air)    Filed Vitals:    01/10/17 0923 01/11/17 1020 01/12/17 0935   Weight: 63.231 kg (139 lb 6.4 oz) 65.772 kg (145 lb) 65.8 kg (145 lb 1 oz)     Vital Signs with Ranges  Temp:  [96.2  F (35.7  C)-98.4  F (36.9  C)] 98  F (36.7  C)  Pulse:  [64-74] 64  Heart Rate:  [67-79] 74  Resp:  [16-20] 20  BP: (142-170)/(66-89) 162/83 mmHg  SpO2:  [91 %-96 %] 94 %  I/O last 3 completed shifts:  In: 820 [P.O.:660; I.V.:160]  Out: 1775 [Urine:1775]    Constitutional: Well appearing, in no acute distress  Eyes: sclera anicteric, conjunctiva clear  HEENT: normocephalic, atraumatic, moist mucus membranes, no thrush or mucositis  Respiratory: faint crackles b/l bases.   Cardiovascular: RRR, S1S2, no m/r/g  GI: soft, nondistended, nontender  Genitourinary: deferred  Skin: No visible  rashes, PICC in place  Musculoskeletal: Trace LLE edema, no RLE edema, no swollen or erythematous joints  Neurologic: alert and oriented x 3, no focal deficits  Psychiatric: appropriate affect    Medications    - MEDICATION INSTRUCTIONS -        -INFUSION HYPERSENSITIVITY-         levofloxacin  500 mg Oral Daily     cycloSPORINE modified  250 mg Oral BID IS     senna-docusate  1-2 tablet Oral BID     sodium chloride (PF)  10 mL Intracatheter Q7 Days     fluconazole  200 mg Oral Daily     diphenhydrAMINE  25 mg Oral At Bedtime     diclofenac  2 g Topical BID     eltrombopag  50 mg Oral Daily     anti-thymo immune glob (ATGAM - equine) intermittent infusion  40 mg/kg (Treatment Plan Actual) Intravenous Q24H     methylPREDNISolone  0.5 mg/kg (Treatment Plan Actual) Intravenous Q24H     [START ON 1/13/2017] predniSONE  1 mg/kg (Treatment Plan Actual) Oral Daily     pantoprazole  40 mg Oral QAM       Data  Results for orders placed or performed during the hospital encounter of 01/09/17 (from the past 24 hour(s))   Platelet count   Result Value Ref Range    Platelet Count 14 (LL) 150 - 450 10e9/L   CBC with platelets differential   Result Value Ref Range    WBC 0.7 (LL) 4.0 - 11.0 10e9/L    RBC Count 2.34 (L) 3.8 - 5.2 10e12/L    Hemoglobin 7.1 (L) 11.7 - 15.7 g/dL    Hematocrit 21.2 (L) 35.0 - 47.0 %    MCV 91 78 - 100 fl    MCH 30.3 26.5 - 33.0 pg    MCHC 33.5 31.5 - 36.5 g/dL    RDW 19.2 (H) 10.0 - 15.0 %    Platelet Count 10 (LL) 150 - 450 10e9/L    Diff Method Manual Differential     % Neutrophils 78.2 %    % Lymphocytes 17.5 %    % Monocytes 4.3 %    % Eosinophils 0.0 %    % Basophils 0.0 %    Nucleated RBCs 9 (H) 0 /100    Absolute Neutrophil 0.5 (L) 1.6 - 8.3 10e9/L    Absolute Lymphocytes 0.1 (L) 0.8 - 5.3 10e9/L    Absolute Monocytes 0.0 0.0 - 1.3 10e9/L    Absolute Eosinophils 0.0 0.0 - 0.7 10e9/L    Absolute Basophils 0.0 0.0 - 0.2 10e9/L    Absolute Nucleated RBC 0.1     Anisocytosis Moderate    Basic  metabolic panel   Result Value Ref Range    Sodium 141 133 - 144 mmol/L    Potassium 4.1 3.4 - 5.3 mmol/L    Chloride 110 (H) 94 - 109 mmol/L    Carbon Dioxide 22 20 - 32 mmol/L    Anion Gap 10 3 - 14 mmol/L    Glucose 147 (H) 70 - 99 mg/dL    Urea Nitrogen 25 7 - 30 mg/dL    Creatinine 0.69 0.52 - 1.04 mg/dL    GFR Estimate 84 >60 mL/min/1.7m2    GFR Estimate If Black >90   GFR Calc   >60 mL/min/1.7m2    Calcium 8.2 (L) 8.5 - 10.1 mg/dL   Magnesium   Result Value Ref Range    Magnesium 1.9 1.6 - 2.3 mg/dL   Phosphorus   Result Value Ref Range    Phosphorus 2.8 2.5 - 4.5 mg/dL   Platelets prepare order unit conditional   Result Value Ref Range    Ordered Component Type PLT Pheresis     Units Ordered 1    Blood component   Result Value Ref Range    Unit Number [id number]     Blood Component Type PlateletPheresis,LeukoRed Irrad (Part 2)     Division Number 00     Status of Unit Released to care unit 01/12/2017 0755     Blood Product Code [id number]     Unit Status ISS        Staff Addendum: Patient was seen and examined by me. All  labs, VS and pertinent images were reviewed with the team on rounds. Plans for care were mutually developed and outlined above with my direct input.    Interval Events/ROS: No new fevers. Bps remain fluctuant. OCASIO and nervous about going home. Some edema noted in LEs. Rest as above.      Exam: NAD/AAO, cooperative with exam. OP and lungs were clear, HRRR, Abd soft/NT/ND, no HSM or masses, Tr LE edema, Nro grossly non-focal, Skin exam was without rash. Few scattered bruised noted on extrems.     A/P: 70 yo F with idiopathic AMELIA here for immunosuppressive therapy; day 4 of ATG/MP/CSA  - Will get plts and lasix today given pt's OCASIO and some third spacing.     - Check CSA again tomorrow given inc'd dose.   - Further per clinical course. D/C tomorrow or Sat expected.   Merritt Murray, DO

## 2017-01-12 NOTE — PLAN OF CARE
Problem: Goal Outcome Summary  Goal: Goal Outcome Summary  Outcome: No Change  Dose #3 ATG completed infusing, tolerated at the rate of 150 ml/hr; Hypertensive post ATG, max /88, Hydralazine IV x1 given, BP recheck 150/80. OVSS, afebrile. Reports ongoing HA, Oxy PO x1 given. Platelets recheck 14, no replacement needed. Reports SOB unchanged. Independent. Formed BM x2. Continue with POC.

## 2017-01-13 ENCOUNTER — TELEPHONE (OUTPATIENT)
Dept: FAMILY MEDICINE | Facility: CLINIC | Age: 72
End: 2017-01-13

## 2017-01-13 VITALS
BODY MASS INDEX: 25.25 KG/M2 | SYSTOLIC BLOOD PRESSURE: 145 MMHG | RESPIRATION RATE: 18 BRPM | WEIGHT: 142.5 LBS | HEART RATE: 74 BPM | HEIGHT: 63 IN | OXYGEN SATURATION: 94 % | DIASTOLIC BLOOD PRESSURE: 83 MMHG | TEMPERATURE: 97.7 F

## 2017-01-13 LAB
ANION GAP SERPL CALCULATED.3IONS-SCNC: 10 MMOL/L (ref 3–14)
ANISOCYTOSIS BLD QL SMEAR: SLIGHT
BASOPHILS # BLD AUTO: 0 10E9/L (ref 0–0.2)
BASOPHILS NFR BLD AUTO: 0 %
BLD PROD TYP BPU: NORMAL
BLD PROD TYP BPU: NORMAL
BLD UNIT ID BPU: 0
BLOOD PRODUCT CODE: NORMAL
BPU ID: NORMAL
BUN SERPL-MCNC: 28 MG/DL (ref 7–30)
CALCIUM SERPL-MCNC: 8.6 MG/DL (ref 8.5–10.1)
CHLORIDE SERPL-SCNC: 106 MMOL/L (ref 94–109)
CO2 SERPL-SCNC: 22 MMOL/L (ref 20–32)
CREAT SERPL-MCNC: 0.74 MG/DL (ref 0.52–1.04)
CYCLOSPORINE BLD LC/MS/MS-MCNC: 229 UG/L (ref 50–400)
DIFFERENTIAL METHOD BLD: ABNORMAL
EOSINOPHIL # BLD AUTO: 0 10E9/L (ref 0–0.7)
EOSINOPHIL NFR BLD AUTO: 0 %
ERYTHROCYTE [DISTWIDTH] IN BLOOD BY AUTOMATED COUNT: 18.9 % (ref 10–15)
GFR SERPL CREATININE-BSD FRML MDRD: 78 ML/MIN/1.7M2
GLUCOSE SERPL-MCNC: 170 MG/DL (ref 70–99)
HCT VFR BLD AUTO: 22.6 % (ref 35–47)
HGB BLD-MCNC: 7.7 G/DL (ref 11.7–15.7)
LYMPHOCYTES # BLD AUTO: 0.4 10E9/L (ref 0.8–5.3)
LYMPHOCYTES NFR BLD AUTO: 35.1 %
MCH RBC QN AUTO: 30.2 PG (ref 26.5–33)
MCHC RBC AUTO-ENTMCNC: 34.1 G/DL (ref 31.5–36.5)
MCV RBC AUTO: 89 FL (ref 78–100)
MONOCYTES # BLD AUTO: 0 10E9/L (ref 0–1.3)
MONOCYTES NFR BLD AUTO: 4.1 %
NEUTROPHILS # BLD AUTO: 0.6 10E9/L (ref 1.6–8.3)
NEUTROPHILS NFR BLD AUTO: 60.8 %
NRBC # BLD AUTO: 0.1 10*3/UL
NRBC BLD AUTO-RTO: 11 /100
NUM BPU REQUESTED: 1
PLATELET # BLD AUTO: 12 10E9/L (ref 150–450)
PLATELET # BLD AUTO: 67 10E9/L (ref 150–450)
PLATELET # BLD EST: ABNORMAL 10*3/UL
POIKILOCYTOSIS BLD QL SMEAR: SLIGHT
POTASSIUM SERPL-SCNC: 3.6 MMOL/L (ref 3.4–5.3)
RBC # BLD AUTO: 2.55 10E12/L (ref 3.8–5.2)
SODIUM SERPL-SCNC: 138 MMOL/L (ref 133–144)
TME LAST DOSE: NORMAL H
TRANSFUSION STATUS PATIENT QL: NORMAL
TRANSFUSION STATUS PATIENT QL: NORMAL
WBC # BLD AUTO: 1 10E9/L (ref 4–11)

## 2017-01-13 PROCEDURE — 25000125 ZZHC RX 250: Performed by: INTERNAL MEDICINE

## 2017-01-13 PROCEDURE — 80158 DRUG ASSAY CYCLOSPORINE: CPT | Performed by: INTERNAL MEDICINE

## 2017-01-13 PROCEDURE — 25000132 ZZH RX MED GY IP 250 OP 250 PS 637: Performed by: INTERNAL MEDICINE

## 2017-01-13 PROCEDURE — 85025 COMPLETE CBC W/AUTO DIFF WBC: CPT | Performed by: PHYSICIAN ASSISTANT

## 2017-01-13 PROCEDURE — P9037 PLATE PHERES LEUKOREDU IRRAD: HCPCS | Performed by: PHYSICIAN ASSISTANT

## 2017-01-13 PROCEDURE — 36592 COLLECT BLOOD FROM PICC: CPT | Performed by: PHYSICIAN ASSISTANT

## 2017-01-13 PROCEDURE — 85049 AUTOMATED PLATELET COUNT: CPT | Performed by: PHYSICIAN ASSISTANT

## 2017-01-13 PROCEDURE — 80048 BASIC METABOLIC PNL TOTAL CA: CPT | Performed by: PHYSICIAN ASSISTANT

## 2017-01-13 PROCEDURE — 25000132 ZZH RX MED GY IP 250 OP 250 PS 637: Performed by: PHYSICIAN ASSISTANT

## 2017-01-13 PROCEDURE — 40000802 ZZH SITE CHECK

## 2017-01-13 RX ORDER — AMLODIPINE BESYLATE 5 MG/1
5 TABLET ORAL DAILY
Status: DISCONTINUED | OUTPATIENT
Start: 2017-01-13 | End: 2017-01-13 | Stop reason: HOSPADM

## 2017-01-13 RX ORDER — PANTOPRAZOLE SODIUM 40 MG/1
40 TABLET, DELAYED RELEASE ORAL EVERY MORNING
Qty: 30 TABLET | Refills: 1 | Status: SHIPPED | OUTPATIENT
Start: 2017-01-13 | End: 2017-03-17

## 2017-01-13 RX ORDER — CYCLOSPORINE 100 MG/1
200 CAPSULE ORAL 2 TIMES DAILY
Qty: 120 CAPSULE | Refills: 0 | Status: SHIPPED | OUTPATIENT
Start: 2017-01-13 | End: 2017-01-23

## 2017-01-13 RX ORDER — CYCLOSPORINE 25 MG/1
50 CAPSULE, LIQUID FILLED ORAL 2 TIMES DAILY
Qty: 120 CAPSULE | Refills: 0 | Status: ON HOLD | OUTPATIENT
Start: 2017-01-13 | End: 2017-02-10

## 2017-01-13 RX ORDER — AMLODIPINE BESYLATE 5 MG/1
5 TABLET ORAL DAILY
Qty: 30 TABLET | Refills: 0 | Status: ON HOLD | OUTPATIENT
Start: 2017-01-13 | End: 2017-01-25

## 2017-01-13 RX ORDER — PREDNISONE 20 MG/1
1 TABLET ORAL DAILY
Qty: 42 TABLET | Refills: 0 | Status: SHIPPED | OUTPATIENT
Start: 2017-01-13 | End: 2017-01-26

## 2017-01-13 RX ADMIN — METHYLPREDNISOLONE SODIUM SUCCINATE 31 MG: 40 INJECTION, POWDER, LYOPHILIZED, FOR SOLUTION INTRAMUSCULAR; INTRAVENOUS at 00:51

## 2017-01-13 RX ADMIN — LEVOFLOXACIN 500 MG: 500 TABLET, FILM COATED ORAL at 08:52

## 2017-01-13 RX ADMIN — ELTROMBOPAG OLAMINE 50 MG: 25 TABLET, FILM COATED ORAL at 08:56

## 2017-01-13 RX ADMIN — PREDNISONE 60 MG: 50 TABLET ORAL at 08:52

## 2017-01-13 RX ADMIN — FLUCONAZOLE 200 MG: 200 TABLET ORAL at 08:52

## 2017-01-13 RX ADMIN — ACETAMINOPHEN 650 MG: 325 TABLET, FILM COATED ORAL at 14:38

## 2017-01-13 RX ADMIN — SENNOSIDES AND DOCUSATE SODIUM 2 TABLET: 8.6; 5 TABLET ORAL at 08:52

## 2017-01-13 RX ADMIN — PANTOPRAZOLE SODIUM 40 MG: 40 TABLET, DELAYED RELEASE ORAL at 08:52

## 2017-01-13 RX ADMIN — AMLODIPINE BESYLATE 5 MG: 5 TABLET ORAL at 08:56

## 2017-01-13 RX ADMIN — DICLOFENAC SODIUM 2 G: 10 GEL TOPICAL at 08:52

## 2017-01-13 RX ADMIN — CYCLOSPORINE 250 MG: 100 CAPSULE ORAL at 08:52

## 2017-01-13 ASSESSMENT — PAIN DESCRIPTION - DESCRIPTORS
DESCRIPTORS: HEADACHE
DESCRIPTORS: HEADACHE

## 2017-01-13 NOTE — PLAN OF CARE
"Problem: Goal Outcome Summary  Goal: Goal Outcome Summary  Outcome: No Change  VSS, afebrile. RA. BP WNL. Still states she has \"mild headache,\" but declined pain medication. L) PICC x 2, saline locked. Resting comfortably. Will continue w/POC.         "

## 2017-01-13 NOTE — PLAN OF CARE
Problem: Discharge Planning  Goal: Discharge Planning (Adult, OB, Behavioral, Peds)  1/13/17 Per discussion with unit Care Coordinator : PLC to teach PICC trouble shooting very basics only . Clinic will do saline flushes , end cap , and dressing changes . I reviewed very basic PICC trouble shooting per [facility] For Your PICC handout . I encouraged the pt to read the sheet as well before discharge and make sure and ask her nurse if she has any further questions or concerns. Pt verbalized understanding of content presented.Above information discussed with pt's nurse and unit Care Coordinator.Hospital and clinic staff to continue to reinforce information .

## 2017-01-13 NOTE — TELEPHONE ENCOUNTER
Shivani,    I think they are asking who cosigns on Medicare Certifications for homecare?  Are you able to do without cosign?  Ie. NP would have to have a MD cosign for Medicare.   Kindra Rojo RN

## 2017-01-13 NOTE — DISCHARGE SUMMARY
[facility], [place]    Discharge Summary  Hospitalist    Date of Admission:  1/9/2017  Date of Discharge:  1/13/2017  Discharging Provider: Susan Root  Date of Service (when I saw the patient): 01/13/2017    Discharge Diagnoses  Idiopathic aplastic anemia  Hypertension    History of Present Illness  Bonny Raymundo is a 71 year old female with idiopathic aplastic anemia, PMH of provoked DVT x 2 and PE who presents with to start treatment with ATG, cyclosporine, eltrombopag and steroids.     Hospital Course  Bonny Raymundo was admitted on 1/9/2017.  The following problems were addressed during her hospitalization:    HEME:  #Aplastic anemia: pancytopenia. First found on CBC 9/16 with symptoms of SOB. Primary hematologist is Dr. Rogel. Admitted to start treatment. PICC line placed prior to admission, patient wanted to keep it in due to frequency of transfusions and lab draws. Received teaching about cares, discussed risk of infection. Patient completed 4 days of ATG and methylpred along with PO cyclosporine and eltrombopag. She tolerated treatment well except for fever after the first dose and elevated BP. Cyclosporine dose increased to 250 mg bid on 1/11. Level therapeutic on 1/13 prior to discharge.   - Continue cyclosporine 250 mg bid, eltrombopag 50 mg daily (per pharmacy will not be able to get it delivered to her house until 1/17), and prednisone 60 mg daily.   - F/u in clinic for 3 time per week CBC and transfusions as needed  - Transfuse prn for hgb <7 and plt <10k  - Will have cyclosporine level and CMV repeated in clinic on 1/17    ID:  #PPX: continue PTA fluconazole 200 mg daily, levaquin 500 mg daily     #Neutropenic fever: Spiked a fever shortly after receiving her first dose of ATG. LA was 3.5. CXR no evidence of infection. UA negative. BC NGTD. She was given vanc and cefepime. No new symptoms of infection. This is likely a reaction to ATG.  D/c cefepime on  1/11 as she has been afebrile >24 hours and BC NGTD. Resume ppx levaquin    CV:  #Hx DVT: 2 episodes of provoked DVTs in 2012 and 2014 (travel related) with associated PE in 2012. Treated in the past with warfarin and lovenox. Transitioned to ASA which was stopped in the setting of thrombocytopenia    #Hypertensive: BP significantly elevated to 212/85. Improved with hydralazine. Continues to be elevated but not as significantly. Likely from ATG, steroids or cyclosporine. Controlled with hydralazine prn and lasix (mild fluid overload). Added norvasc today.   - Continue norvasc 5 mg daily and can be followed in clinic.      HEENT:  #Allergies: Continue prn claritin and benadryl qhs    PULM:  #Hypoxia: she was mildly hypoxic (89%) while febrile 1/10. Also likely due to ATG. This AM 94% on RA.      Susan Root PA-C  Hematology/Oncology    Significant Results and Procedures    Pending Results  Unresulted Labs Ordered in the Past 30 Days of this Admission     Date and Time Order Name Status Description    1/9/2017 2115 Blood culture Preliminary     1/9/2017 2115 Blood culture Preliminary           Code Status  Full Code       Primary Care Physician  Shivani Phelps    Constitutional: Well appearing, in no acute distress  Eyes: sclera anicteric, conjunctiva clear  HEENT: normocephalic, atraumatic, moist mucus membranes, no thrush or mucositis  Respiratory: faint crackles b/l bases.   Cardiovascular: RRR, S1S2, no m/r/g  GI: soft, nondistended, nontender  Skin: No visible rashes, PICC in place  Musculoskeletal: Trace LLE edema, no RLE edema, no swollen or erythematous joints  Neurologic: alert and oriented x 3, no focal deficits  Psychiatric: appropriate affect    Discharge Disposition  Discharged to home  Condition at discharge: Satisfactory    Consultations This Hospital Stay  PHARMACY TO DOSE VANCO  VASCULAR ACCESS ADULT IP CONSULT  PATIENT LEARNING CENTER IP CONSULT    Time Spent on This  Encounter    Discharge Orders    CBC with platelets and differential   Last Lab Result: HEMOGLOBIN (g/dL)      Date                     Value                01/13/2017               7.7*             ----------     Basic metabolic panel  (Ca, Cl, CO2, Creat, Gluc, K, Na, BUN)     Cyclosporine     CMV DNA quantification     MED THERAPY MANAGE REFERRAL     Home care nursing referral     Reason for your hospital stay   Treatment for aplastic anemia with ATG, cyclosporine and eltrombopag     Activity   Your activity upon discharge: activity as tolerated     Discharge Instructions   New medications: Cyclosporine take a total of 250 mg twice per day (this will be two 100 mg pills and two 25 mg pills), eltrombopag 50 mg once per day, prednisone 60 mg daily, protonix 40 mg in the morning before eating (this will help protect you stomach while taking prednisone. Amlodipine 5 mg daily for high blood pressure    [facility] ([phone]) Monday - Friday during regular business hours (8-4:30), or call the main hospital number ([phone]) and request to speak with the on-call hematology/oncology physician (after hours and weekends)  for temperature > 100.4, shortness of breath, chest pain, headaches, vision changes, bleeding, uncontrolled nausea, vomiting, diarrhea, or pain.     IV access   You are going home with the following vascular access device: PICC.     Full Code     Diet   Follow this diet upon discharge: Regular       Discharge Medications  Current Discharge Medication List      START taking these medications    Details   GENGRAF 100 MG PO CAPSULE Take 2 capsules (200 mg) by mouth 2 times daily Along with two 25 mg capsules  Qty: 120 capsule, Refills: 0    Associated Diagnoses: Pancytopenia (H); Idiopathic aplastic anemia (H)      cycloSPORINE modified (GENERIC EQUIVALENT) 25 MG capsule Take 2 capsules (50 mg) by mouth 2 times daily Along with two 100 mg capsules  Qty: 120 capsule,  Refills: 0    Associated Diagnoses: Idiopathic aplastic anemia (H)      amLODIPine (NORVASC) 5 MG tablet Take 1 tablet (5 mg) by mouth daily  Qty: 30 tablet, Refills: 0    Associated Diagnoses: Other secondary hypertension      predniSONE (DELTASONE) 20 MG tablet Take 3 tablets (60 mg) by mouth daily  Qty: 42 tablet, Refills: 0    Associated Diagnoses: Pancytopenia (H); Idiopathic aplastic anemia (H)      pantoprazole (PROTONIX) 40 MG EC tablet Take 1 tablet (40 mg) by mouth every morning  Qty: 30 tablet, Refills: 1    Associated Diagnoses: Gastroesophageal reflux disease without esophagitis      eltrombopag (PROMACTA) 50 MG tablet Take 1 tablet (50 mg) by mouth daily Administer on an empty stomach, 1 hour before or 2 hours after a meal.  Qty: 30 tablet, Refills: 1    Associated Diagnoses: Pancytopenia (H); Idiopathic aplastic anemia (H)         CONTINUE these medications which have NOT CHANGED    Details   fluconazole (DIFLUCAN) 200 MG tablet Take 1 tablet (200 mg) by mouth daily  Qty: 30 tablet, Refills: 3    Associated Diagnoses: Pancytopenia (H)      diclofenac (VOLTAREN) 1 % GEL topical gel Apply 2 g topically 2 times daily  Qty: 100 g, Refills: 1    Associated Diagnoses: Myofascial pain      levofloxacin (LEVAQUIN) 500 MG tablet Take 1 tablet (500 mg) by mouth daily  Qty: 30 tablet, Refills: 3    Associated Diagnoses: Pancytopenia (H)      Acetaminophen (TYLENOL PO) Take 325 mg by mouth every 6 hours as needed for mild pain or fever      DiphenhydrAMINE HCl (BENADRYL ALLERGY PO) Take 25 mg by mouth At Bedtime       calcium-vitamin D (CALTRATE) 600-400 MG-UNIT per tablet Take 1 tablet by mouth daily.        loratadine (CLARITIN) 10 MG tablet Take 10 mg by mouth daily as needed. For head cold       MULTIVITAMIN TABS   OR 1 tablet daily      COMPRESSION STOCKINGS Wear compression stockings at 20-30 mmHg rating most time during the day to the affected leg (left leg) or both legs. Take them off at night.  Qty: 2  each, Refills: 2    Associated Diagnoses: DVT (deep venous thrombosis), left; Postphlebitic syndrome      ORDER FOR DME Equipment being ordered: knee high compression stockings- 18-20 mm  Qty: 1 Box, Refills: 1    Associated Diagnoses: DVT prophylaxis      chlorpheniramine (CHLOR-TRIMETON) 4 MG tablet Take 4 mg by mouth every 6 hours as needed. For head cold            Allergies  Allergies   Allergen Reactions     Cats      Dogs      No Known Drug Allergies      Seasonal Allergies      Data  Most Recent 3 CBC's:  Recent Labs   Lab Test  01/13/17   0815  01/12/17   1741  01/12/17   0543   01/11/17   0813   WBC  1.0*   --   0.7*   --   0.6*   HGB  7.7*   --   7.1*   --   7.1*   MCV  89   --   91   --   92   PLT  12*  14*  10*   < >  24*    < > = values in this interval not displayed.      Most Recent 3 BMP's:  Recent Labs   Lab Test  01/13/17   0815  01/12/17   0543  01/11/17   0813   NA  138  141  143   POTASSIUM  3.6  4.1  4.6   CHLORIDE  106  110*  114*   CO2  22  22  20   BUN  28  25  21   CR  0.74  0.69  0.63   ANIONGAP  10  10  10   LEO  8.6  8.2*  8.1*   GLC  170*  147*  143*     Most Recent 2 LFT's:  Recent Labs   Lab Test  01/09/17   1354  12/29/16   0822   AST  18  19   ALT  33  21   ALKPHOS  122  101   BILITOTAL  0.3  0.5     Most Recent INR's and Anticoagulation Dosing History:  Anticoagulation Dose History     Recent Dosing and Labs Latest Ref Rn 7/23/2014 8/4/2014 8/13/2014 9/4/2014 9/23/2014 10/27/2014 12/29/2016    INR 0.86 - 1.14 - - - - - - 1.06    INR 0.86 - 1.14 4.0(A) 3.8(A) 3.1(A) 2.0(A) 2.3(A) - -    INR Point of Care 0.86 - 1.14 - - - - - 1.7(H) -        Most Recent 3 Troponin's:  Recent Labs   Lab Test  09/08/16   0914  07/06/14   1654  09/11/12   1832   TROPI  <0.015  The 99th percentile for upper reference range is 0.045 ug/L.  Troponin values in   the range of 0.045 - 0.120 ug/L may be associated with risks of adverse   clinical events.    <0.012  <0.012     Most Recent Cholesterol  Panel:  Recent Labs   Lab Test  02/20/14   1049   CHOL  185   LDL  117   HDL  53   TRIG  72     Most Recent 6 Bacteria Isolates From Any Culture (See EPIC Reports for Culture Details):  Recent Labs   Lab Test  01/09/17   2134  01/09/17   2129  10/19/16   1403  03/14/14   0938  08/16/11   1149   CULT  No growth after 3 days  No growth after 3 days  Moderate growth Normal baldemar  No Beta Streptococcus isolated    No Beta Streptococcus isolated  >100,000 colonies/mL Klebsiella pneumoniae     Most Recent TSH, T4 and A1c Labs:No lab results found.  Results for orders placed or performed during the hospital encounter of 01/09/17   XR Chest 1 View    Narrative    Exam:  XR CHEST 1 VW, 1/9/2017 10:48 PM    History: fever    Comparison:  Chest CT, 9/8/2016    Findings:  There is a left PICC projecting over the SVC with medial  deviation of the tip, likely in the azygos vein. The cardiac  silhouette and pulmonary vessels are within normal limits. Linear  opacities in lung bases likely represent scarring or atelectasis. The  lungs are otherwise clear no pleural effusion or pneumothorax.      Impression    Impression:    1.  No acute airspace abnormality.  2.  Left PICC projecting over the SVC with medial deviation of the  tip, likely toward/in the azygos vein    I have personally reviewed the examination and initial interpretation  and I agree with the findings.    HIRAM LEHMAN MD   XR Chest Port 1 View    Narrative    Exam:  XR CHEST PORT 1 VW, 1/10/2017 3:11 PM    History: check PICC position    Comparison:  Chest radiograph 1/9/2017 and chest CT 9/8/2016    Findings:  Single portable AP view of the chest was obtained.  Left-sided PICC with tip projecting over the low SVC.    The cardiomediastinal silhouette is stable. Atherosclerotic  calcifications of the aortic arch. The trachea is midline. Pulmonary  vasculature is indistinct. Small left-sided pleural effusion with  overlying patchy opacities. No pleural effusion on  the right. No  appreciable pneumothorax. Biapical scarring. Right retrocardiac nodule  noted which is better evaluated on chest CT 9/8/2016. No acute  airspace opacities.    The visualized upper abdomen is unremarkable. No acute osseous  abnormality.      Impression    Impression:    1. Left-sided PICC with tip projecting over the low SVC.  2. Small left-sided pleural effusion with overlying patchy  atelectasis.  3. Retrocardiac nodule better evaluated on chest CT 9/8/2016.    I have personally reviewed the examination and initial interpretation  and I agree with the findings.    ISMAEL KHOURY MD     Staff Addendum: Patient was seen and examined by me. All  labs, VS and pertinent images were reviewed with the team on rounds. Plans for care were mutually developed and outlined above with my direct input.    Interval Events/ROS: Feeling ok this AM. Seems at her baseline save for a bit more OCASIO. No new fevers. Nervous about going home but happy to be going. Rest as above.      Exam: NAD/AAO, cooperative with exam. OP and lungs were clear, HRRR, Abd soft/NT/ND, no HSM or masses, Tr LE edema, Nro grossly non-focal, Skin exam was without rash. Few scattered bruised noted on extrems.     A/P: 72 yo F with idiopathic AMELIA here for immunosuppressive therapy; day 5 of ATG/MP/CSA  - OK for d/c today. Will have follow-up over the weekend to assess plts.   - Add CCB for BP control.   - Trial of oral lasix.     >30 mins spent in coordination of this D/C.     Merritt Murray DO

## 2017-01-13 NOTE — TELEPHONE ENCOUNTER
PCP:    Spoke with Jolly with HomeCare.   She needs confirmation that PCP and Dr. Wen will follow the Patient for her HomeCare services.    Please advise and triage will call back with the confirmation.    Thank you    Little Guerrero RN

## 2017-01-13 NOTE — PLAN OF CARE
Problem: Goal Outcome Summary  Goal: Goal Outcome Summary  Outcome: No Change  AVSS, afebrile. BP WNL. Platelet recheck 14, no additional infusions needed. Reports mild HA, Oxy PO x1 given, comfortable. Pt observe to be more SOB than first seen by writer at 1600; pt declines feeling more SOB when at rest in bed, however feels more SOB when speaking, taking meds, eating and ambulating; MD notified, no additional interventions given; sating 92-93% on RA. Medium soft BM x1 this evening, scheduled Senna given. Dose #4 ATG completed. Decline ambulation in halls. Continue with POC.

## 2017-01-13 NOTE — TELEPHONE ENCOUNTER
Left message for Nurse to return call to Triage at Clinic.  Is nurse just needing ongoing medicare HC orders, what about would pt need OV FU with Shivani post hospitalization?  Kindra Rojo RN

## 2017-01-13 NOTE — PLAN OF CARE
Problem: Chemotherapy Effects (Adult)  Goal: Signs and Symptoms of Listed Potential Problems Will be Absent or Manageable (Chemotherapy Effects)  Signs and symptoms of listed potential problems will be absent or manageable by discharge/transition of care (reference Chemotherapy Effects (Adult) CPG).   Outcome: No Change    Transfused 1 unit of platelets without incident per 1x order.  Pt c/o mild headache, given Tylenol.  Pt will d/c with PICC, PLC RN did teaching at the bedside.  HTN, BP decreased WNL without meds.  OVSS.  Sating on RA mid 90%s.  Afebrile.  Denies nausea.  D/c this evening.

## 2017-01-13 NOTE — TELEPHONE ENCOUNTER
Reason for Call:  Home Health Care    Jolly with  Homecare called regarding (reason for call): orders    Orders are needed for this patient.     Nurse Jolly received orders from [facility] for HC eval and treat for med   Management; she wants to make sure we follow up with them          Pt Provider: DOROTA Phelps    Phone Number Homecare Nurse can be reached at: [phone]    Can we leave a detailed message on this number? YES        Call taken on 1/13/2017 at 3:52 PM by Vannessa Sung

## 2017-01-13 NOTE — PROGRESS NOTES
Care Coordinator- Discharge Planning     Admission Date/Time:  1/9/2017  Attending MD:  Merritt Murray MD  Hematologist: Dr. Rogel, [facility]     Data  Date of initial CC assessment:  01/13/2017  Chart reviewed, discussed with interdisciplinary team.   Patient was admitted for:   1. Idiopathic aplastic anemia (H)    2. Pancytopenia (H)    3. Other secondary hypertension    4. Gastroesophageal reflux disease without esophagitis         Assessment  Concerns with insurance coverage for discharge needs: None.  Current Living Situation: Patient lives alone.  Support System: Supportive  Services Involved: Home Care  Transportation: Family or Friend will provide  Barriers to Discharge: Patient lives alone with limited resources.      Coordination of Care and Referrals: Provided patient/family with options for Home Care.    Per DOROTA Hawk, patient will discharge today; follow up has been scheduled with [facility] and [facility]. Patient will discharge with PICC line; cares to be done in clinic.     Writer met with patient to review the role of the care coordinator and assess discharge needs. Patient shared would like nursing visits upon discharge to reinforce PICC education and home safety evaluation. Patient's son plans to move in with her in a month, but she will live alone with limited resources until then; referral made to [facility] for RN visits.     UPDATE 2:50 PM  Writer notified by [facility] that they are not able to accept the referral due to staffing. [facility]      Plan  Anticipated Discharge Date:  01/13/2017  Anticipated Discharge Plan:  Home with frequent clinic follow up    CTS Handoff completed:  YES (DELGADO Griffith)    DELGADO Obrien  Phone [phone]  Pager [phone]

## 2017-01-13 NOTE — DISCHARGE INSTRUCTIONS
Discharging RN or NST, Please fax at discharge to ensure continuity of care:    [facility]  Phone  [phone]  Fax  [phone]

## 2017-01-14 NOTE — PLAN OF CARE
Problem: Goal Outcome Summary  Goal: Goal Outcome Summary  Outcome: Adequate for Discharge Date Met:  01/13/17  Discharge  D: Orders for discharge and outpatient medications written.  I: Peripherally inserted central catheter saline-locked and external tubing secured with flexible mesh. Teaching reviewed on day shift. Home medications and return to clinic schedule reviewed with patient. Discharge instructions and parameters for calling Health Care Provider reviewed. After Visit Summary faxed to home nursing provider. Patient left at 18:00 accompanied by nursing assistant.   A: Patient verbalized understanding and was ready for discharge.   P: Patient instructed to  medications in Pharmacy. Follow up as scheduled on Sunday am for blood draw. Clinic to provide care for valved double-lumen peripherally inserted central catheter.

## 2017-01-15 ENCOUNTER — HOSPITAL ENCOUNTER (OUTPATIENT)
Facility: CLINIC | Age: 72
Setting detail: SPECIMEN
Discharge: HOME | End: 2017-01-15
Attending: PHYSICIAN ASSISTANT | Admitting: PHYSICIAN ASSISTANT
Payer: COMMERCIAL

## 2017-01-15 ENCOUNTER — INFUSION THERAPY VISIT (OUTPATIENT)
Dept: INFUSION THERAPY | Facility: CLINIC | Age: 72
DRG: 810 | End: 2017-01-15
Attending: PHYSICIAN ASSISTANT
Payer: COMMERCIAL

## 2017-01-15 VITALS
RESPIRATION RATE: 18 BRPM | SYSTOLIC BLOOD PRESSURE: 121 MMHG | DIASTOLIC BLOOD PRESSURE: 80 MMHG | TEMPERATURE: 97.3 F | OXYGEN SATURATION: 96 %

## 2017-01-15 DIAGNOSIS — D61.3 IDIOPATHIC APLASTIC ANEMIA (H): ICD-10-CM

## 2017-01-15 LAB
BASOPHILS # BLD AUTO: 0 10E9/L (ref 0–0.2)
BASOPHILS NFR BLD AUTO: 0 %
DIFFERENTIAL METHOD BLD: ABNORMAL
EOSINOPHIL # BLD AUTO: 0 10E9/L (ref 0–0.7)
EOSINOPHIL NFR BLD AUTO: 3 %
ERYTHROCYTE [DISTWIDTH] IN BLOOD BY AUTOMATED COUNT: 18.3 % (ref 10–15)
HCT VFR BLD AUTO: 22.7 % (ref 35–47)
HGB BLD-MCNC: 7.9 G/DL (ref 11.7–15.7)
LYMPHOCYTES # BLD AUTO: 0.4 10E9/L (ref 0.8–5.3)
LYMPHOCYTES NFR BLD AUTO: 39 %
MCH RBC QN AUTO: 30.9 PG (ref 26.5–33)
MCHC RBC AUTO-ENTMCNC: 34.8 G/DL (ref 31.5–36.5)
MCV RBC AUTO: 89 FL (ref 78–100)
MONOCYTES # BLD AUTO: 0.1 10E9/L (ref 0–1.3)
MONOCYTES NFR BLD AUTO: 7 %
NEUTROPHILS # BLD AUTO: 0.6 10E9/L (ref 1.6–8.3)
NEUTROPHILS NFR BLD AUTO: 51 %
NRBC # BLD AUTO: 0 10*3/UL
NRBC BLD AUTO-RTO: 2 /100
PLATELET # BLD AUTO: 32 10E9/L (ref 150–450)
RBC # BLD AUTO: 2.56 10E12/L (ref 3.8–5.2)
WBC # BLD AUTO: 1.1 10E9/L (ref 4–11)

## 2017-01-15 PROCEDURE — 85025 COMPLETE CBC W/AUTO DIFF WBC: CPT | Performed by: PHYSICIAN ASSISTANT

## 2017-01-15 ASSESSMENT — PAIN SCALES - GENERAL: PAINLEVEL: SEVERE PAIN (6)

## 2017-01-15 NOTE — MR AVS SNAPSHOT
After Visit Summary   1/15/2017    Bonny Raymundo    MRN: [id number]           Patient Information     Date Of Birth          1945        Visit Information        Provider Department      1/15/2017 8:00 AM SOUTH CHAIR 8 [facility]        Today's Diagnoses     Idiopathic aplastic anemia (H)            Follow-ups after your visit        Your next 10 appointments already scheduled     Jan 17, 2017 12:00 PM   Masonic Lab Draw with  MASONIC LAB DRAW   [facility] Masonic Lab Draw ([facility])    [address] [place] [address]  2nd [facility] [address]   [phone]            Jan 17, 2017 12:30 PM   (Arrive by 12:15 PM)   Return Visit with Celine Walls PA-C   [facility] ([facility])    [address] [place] [address]   [phone]            Jan 19, 2017 10:00 AM   Level 5 with NORTH CHAIR 3   [facility] ([facility])    [facility] [place]  [address]  [facility] [address]   [phone]            Jan 26, 2017  8:30 AM   Level 5 with NORTH CHAIR 3   [facility] ([facility])    [facility] [place]  [address]  [facility] [address]   [phone]            Jan 26, 2017  2:30 PM   Masonic Lab Draw with  MASONIC LAB DRAW   [facility] Masonic Lab Draw ([facility])    [address] [place] [address]  2nd [facility] [address]   [phone]            Jan 26, 2017  3:00 PM   RETURN ONC with Rafita Rogel MD   [facility] Blood and Marrow Transplant ([facility])    [address] [place] [address]   [phone]            Feb 02, 2017  9:30 AM   Level 5 with NORTH CHAIR 3   [facility] ([facility]  "[facility])    [facility] [place]  [address]  [place] [id number] [phone]              Who to contact     If you have questions or need follow up information about today's clinic visit or your schedule please contact [facility] directly at [phone].  Normal or non-critical lab and imaging results will be communicated to you by MyChart, letter or phone within 4 business days after the clinic has received the results. If you do not hear from us within 7 days, please contact the clinic through MyChart or phone. If you have a critical or abnormal lab result, we will notify you by phone as soon as possible.  Submit refill requests through [employer] or call your pharmacy and they will forward the refill request to us. Please allow 3 business days for your refill to be completed.          Additional Information About Your Visit        [employer] Information     [employer] lets you send messages to your doctor, view your test results, renew your prescriptions, schedule appointments and more. To sign up, go to [email or web address] . Click on \"Log in\" on the left side of the screen, which will take you to the Welcome page. Then click on \"Sign up Now\" on the right side of the page.     You will be asked to enter the access code listed below, as well as some personal information. Please follow the directions to create your username and password.     Your access code is: [email or web address]  Expires: 3/15/2017 11:47 AM     Your access code will  in 90 days. If you need help or a new code, please call your [facility] clinic or [phone].        Care EveryWhere ID     This is your Care EveryWhere ID. This could be used by other organizations to access your [facility] medical records  [id number]        Your Vitals Were     Temperature Respirations Pulse Oximetry             97.3  F (36.3  C) (Oral) 18 96%          Blood Pressure from Last 3 Encounters:   01/15/17 " 121/80   01/13/17 145/83   01/05/17 131/65    Weight from Last 3 Encounters:   01/13/17 64.638 kg (142 lb 8 oz)   01/04/17 62.506 kg (137 lb 12.8 oz)   12/29/16 64.003 kg (141 lb 1.6 oz)              We Performed the Following     CBC with platelets and differential        Primary Care Provider Office Phone # Fax #    Shivani Phelps PA-C [phone]       [facility] [address] [facility] [address]  [facility] [address]        Thank you!     Thank you for choosing [facility]  for your care. Our goal is always to provide you with excellent care. Hearing back from our patients is one way we can continue to improve our services. Please take a few minutes to complete the written survey that you may receive in the mail after your visit with us. Thank you!             Your Updated Medication List - Protect others around you: Learn how to safely use, store and throw away your medicines at www.disposemymeds.org.          This list is accurate as of: 1/15/17  9:10 AM.  Always use your most recent med list.                   Brand Name Dispense Instructions for use    amLODIPine 5 MG tablet    NORVASC    30 tablet    Take 1 tablet (5 mg) by mouth daily       BENADRYL ALLERGY PO      Take 25 mg by mouth At Bedtime       calcium-vitamin D 600-400 MG-UNIT per tablet    CALTRATE     Take 1 tablet by mouth daily.       chlorpheniramine 4 MG tablet    CHLOR-TRIMETON     Take 4 mg by mouth every 6 hours as needed. For head cold       COMPRESSION STOCKINGS     2 each    Wear compression stockings at 20-30 mmHg rating most time during the day to the affected leg (left leg) or both legs. Take them off at night.       * cycloSPORINE modified capsule     120 capsule    Take 2 capsules (200 mg) by mouth 2 times daily Along with two 25 mg capsules       * cycloSPORINE modified 25 MG capsule    GENERIC EQUIVALENT    120 capsule    Take 2 capsules (50 mg) by mouth 2 times daily Along with two  100 mg capsules       diclofenac 1 % Gel topical gel    VOLTAREN    100 g    Apply 2 g topically 2 times daily       eltrombopag 50 MG tablet    PROMACTA    30 tablet    Take 1 tablet (50 mg) by mouth daily Administer on an empty stomach, 1 hour before or 2 hours after a meal.       fluconazole 200 MG tablet    DIFLUCAN    30 tablet    Take 1 tablet (200 mg) by mouth daily       levofloxacin 500 MG tablet    LEVAQUIN    30 tablet    Take 1 tablet (500 mg) by mouth daily       loratadine 10 MG tablet    CLARITIN     Take 10 mg by mouth daily as needed. For head cold       MULTIVITAMIN TABS   OR      1 tablet daily       order for DME     1 Box    Equipment being ordered: knee high compression stockings- 18-20 mm       pantoprazole 40 MG EC tablet    PROTONIX    30 tablet    Take 1 tablet (40 mg) by mouth every morning       predniSONE 20 MG tablet    DELTASONE    42 tablet    Take 3 tablets (60 mg) by mouth daily       TYLENOL PO      Take 325 mg by mouth every 6 hours as needed for mild pain or fever       * Notice:  This list has 2 medication(s) that are the same as other medications prescribed for you. Read the directions carefully, and ask your doctor or other care provider to review them with you.

## 2017-01-15 NOTE — PROGRESS NOTES
Infusion Nursing Note:  Bonny Raymundo presents today for possible plt transfusion.    Patient seen by provider today: No    Note: N/A.    Intravenous Access:  PICC.    Treatment Conditions:  HGB      7.9   1/15/2017  WBC      1.1   1/15/2017   ANEU      0.6   1/13/2017  PLT       32   1/15/2017     Results reviewed, labs MET treatment parameters, ok to proceed with treatment. Patient declined treatment today. HgB level 7.9 trending upwards.  Per patient her SOB with OCASIO is her norm.  She is returning Tuesday to the  for lab draw and appt and would rather receive blood at that time.  Plt today 32,000. Patient given copy of last 3 lab draws (HgB and plts) Also reviewed neutropenic precautions with patient.  Patient agreed to plan and precautions.  Patient has not taken  AM meds today (total of 9) and wanted not to delay those medications by staying here for transfusion.      Discharge Plan:   Copy of AVS reviewed with patient and/or family.  Patient will return Tuesday to the  for next appointment.  Patient discharged in stable condition accompanied by: self.  Departure Mode: Ambulatory.    Too Singh RN

## 2017-01-16 LAB
BACTERIA SPEC CULT: NO GROWTH
BACTERIA SPEC CULT: NO GROWTH
MICRO REPORT STATUS: NORMAL
MICRO REPORT STATUS: NORMAL
SPECIMEN SOURCE: NORMAL
SPECIMEN SOURCE: NORMAL

## 2017-01-17 ENCOUNTER — APPOINTMENT (OUTPATIENT)
Dept: LAB | Facility: CLINIC | Age: 72
End: 2017-01-17
Attending: INTERNAL MEDICINE
Payer: COMMERCIAL

## 2017-01-17 ENCOUNTER — TELEPHONE (OUTPATIENT)
Dept: PHARMACY | Facility: OTHER | Age: 72
End: 2017-01-17

## 2017-01-17 ENCOUNTER — INFUSION THERAPY VISIT (OUTPATIENT)
Dept: ONCOLOGY | Facility: CLINIC | Age: 72
End: 2017-01-17
Attending: INTERNAL MEDICINE
Payer: COMMERCIAL

## 2017-01-17 ENCOUNTER — CARE COORDINATION (OUTPATIENT)
Dept: CASE MANAGEMENT | Facility: CLINIC | Age: 72
End: 2017-01-17

## 2017-01-17 ENCOUNTER — ONCOLOGY VISIT (OUTPATIENT)
Dept: ONCOLOGY | Facility: CLINIC | Age: 72
End: 2017-01-17
Attending: PHYSICIAN ASSISTANT
Payer: COMMERCIAL

## 2017-01-17 VITALS
RESPIRATION RATE: 16 BRPM | OXYGEN SATURATION: 99 % | WEIGHT: 135.8 LBS | DIASTOLIC BLOOD PRESSURE: 63 MMHG | BODY MASS INDEX: 24.06 KG/M2 | SYSTOLIC BLOOD PRESSURE: 109 MMHG | HEIGHT: 63 IN | TEMPERATURE: 97.8 F | HEART RATE: 91 BPM

## 2017-01-17 VITALS
DIASTOLIC BLOOD PRESSURE: 67 MMHG | SYSTOLIC BLOOD PRESSURE: 135 MMHG | TEMPERATURE: 97.9 F | OXYGEN SATURATION: 94 % | RESPIRATION RATE: 16 BRPM

## 2017-01-17 DIAGNOSIS — D61.3 IDIOPATHIC APLASTIC ANEMIA (H): Primary | ICD-10-CM

## 2017-01-17 DIAGNOSIS — M54.50 LEFT-SIDED LOW BACK PAIN WITHOUT SCIATICA, UNSPECIFIED CHRONICITY: ICD-10-CM

## 2017-01-17 DIAGNOSIS — D61.3 IDIOPATHIC APLASTIC ANEMIA (H): ICD-10-CM

## 2017-01-17 DIAGNOSIS — D61.818 PANCYTOPENIA (H): Primary | ICD-10-CM

## 2017-01-17 DIAGNOSIS — M79.89 SWELLING OF RIGHT LOWER EXTREMITY: ICD-10-CM

## 2017-01-17 DIAGNOSIS — K59.00 CONSTIPATION, UNSPECIFIED CONSTIPATION TYPE: ICD-10-CM

## 2017-01-17 LAB
ABO + RH BLD: NORMAL
ABO + RH BLD: NORMAL
ANION GAP SERPL CALCULATED.3IONS-SCNC: 9 MMOL/L (ref 3–14)
ANISOCYTOSIS BLD QL SMEAR: ABNORMAL
BASOPHILS # BLD AUTO: 0 10E9/L (ref 0–0.2)
BASOPHILS NFR BLD AUTO: 0 %
BLD GP AB SCN SERPL QL: NORMAL
BLD PROD TYP BPU: NORMAL
BLD PROD TYP BPU: NORMAL
BLD UNIT ID BPU: 0
BLOOD BANK CMNT PATIENT-IMP: NORMAL
BLOOD PRODUCT CODE: NORMAL
BPU ID: NORMAL
BUN SERPL-MCNC: 35 MG/DL (ref 7–30)
CALCIUM SERPL-MCNC: 8.8 MG/DL (ref 8.5–10.1)
CHLORIDE SERPL-SCNC: 107 MMOL/L (ref 94–109)
CO2 SERPL-SCNC: 25 MMOL/L (ref 20–32)
CREAT SERPL-MCNC: 0.8 MG/DL (ref 0.52–1.04)
DIFFERENTIAL METHOD BLD: ABNORMAL
EOSINOPHIL # BLD AUTO: 0 10E9/L (ref 0–0.7)
EOSINOPHIL NFR BLD AUTO: 0.9 %
ERYTHROCYTE [DISTWIDTH] IN BLOOD BY AUTOMATED COUNT: 19.2 % (ref 10–15)
GFR SERPL CREATININE-BSD FRML MDRD: 70 ML/MIN/1.7M2
GLUCOSE SERPL-MCNC: 132 MG/DL (ref 70–99)
HCT VFR BLD AUTO: 24.4 % (ref 35–47)
HGB BLD-MCNC: 8.4 G/DL (ref 11.7–15.7)
IMM PLASMA CELLS NFR BLD: 3.7 %
LYMPHOCYTES # BLD AUTO: 0.6 10E9/L (ref 0.8–5.3)
LYMPHOCYTES NFR BLD AUTO: 28.4 %
MCH RBC QN AUTO: 31.1 PG (ref 26.5–33)
MCHC RBC AUTO-ENTMCNC: 34.4 G/DL (ref 31.5–36.5)
MCV RBC AUTO: 90 FL (ref 78–100)
MONOCYTES # BLD AUTO: 0.1 10E9/L (ref 0–1.3)
MONOCYTES NFR BLD AUTO: 2.8 %
MYELOCYTES # BLD: 0 10E9/L
MYELOCYTES NFR BLD MANUAL: 0.9 %
NEUTROPHILS # BLD AUTO: 1.4 10E9/L (ref 1.6–8.3)
NEUTROPHILS NFR BLD AUTO: 63.3 %
NRBC # BLD AUTO: 0.1 10*3/UL
NRBC BLD AUTO-RTO: 6 /100
NUM BPU REQUESTED: 1
PLASMA CELLS # BLD MANUAL: 0.1 10E9/L
PLATELET # BLD AUTO: 5 10E9/L (ref 150–450)
POIKILOCYTOSIS BLD QL SMEAR: SLIGHT
POTASSIUM SERPL-SCNC: 3.8 MMOL/L (ref 3.4–5.3)
RBC # BLD AUTO: 2.7 10E12/L (ref 3.8–5.2)
SODIUM SERPL-SCNC: 140 MMOL/L (ref 133–144)
SPECIMEN EXP DATE BLD: NORMAL
TRANSFUSION STATUS PATIENT QL: NORMAL
TRANSFUSION STATUS PATIENT QL: NORMAL
WBC # BLD AUTO: 2.2 10E9/L (ref 4–11)

## 2017-01-17 PROCEDURE — P9037 PLATE PHERES LEUKOREDU IRRAD: HCPCS | Performed by: INTERNAL MEDICINE

## 2017-01-17 PROCEDURE — 36430 TRANSFUSION BLD/BLD COMPNT: CPT

## 2017-01-17 PROCEDURE — 86901 BLOOD TYPING SEROLOGIC RH(D): CPT | Performed by: PHYSICIAN ASSISTANT

## 2017-01-17 PROCEDURE — 86900 BLOOD TYPING SEROLOGIC ABO: CPT | Performed by: PHYSICIAN ASSISTANT

## 2017-01-17 PROCEDURE — 86850 RBC ANTIBODY SCREEN: CPT | Performed by: PHYSICIAN ASSISTANT

## 2017-01-17 PROCEDURE — 99212 OFFICE O/P EST SF 10 MIN: CPT | Mod: 25

## 2017-01-17 PROCEDURE — 80048 BASIC METABOLIC PNL TOTAL CA: CPT | Performed by: PHYSICIAN ASSISTANT

## 2017-01-17 PROCEDURE — 85025 COMPLETE CBC W/AUTO DIFF WBC: CPT | Performed by: PHYSICIAN ASSISTANT

## 2017-01-17 PROCEDURE — 99214 OFFICE O/P EST MOD 30 MIN: CPT | Mod: ZP | Performed by: PHYSICIAN ASSISTANT

## 2017-01-17 RX ORDER — AMOXICILLIN 250 MG
1-2 CAPSULE ORAL 2 TIMES DAILY PRN
Qty: 100 TABLET | Refills: 1 | Status: ON HOLD | OUTPATIENT
Start: 2017-01-17 | End: 2017-01-25

## 2017-01-17 RX ORDER — CYCLOSPORINE 100 MG/1
200 CAPSULE, LIQUID FILLED ORAL 2 TIMES DAILY
Status: ON HOLD | COMMUNITY
Start: 2017-01-13 | End: 2017-01-25

## 2017-01-17 RX ORDER — OXYCODONE HYDROCHLORIDE 5 MG/1
5 TABLET ORAL EVERY 6 HOURS PRN
Qty: 50 TABLET | Refills: 0 | Status: SHIPPED | OUTPATIENT
Start: 2017-01-17 | End: 2017-02-17

## 2017-01-17 RX ORDER — LIDOCAINE 50 MG/G
PATCH TOPICAL
Qty: 30 PATCH | Refills: 0 | Status: SHIPPED | OUTPATIENT
Start: 2017-01-17 | End: 2017-01-23

## 2017-01-17 ASSESSMENT — PAIN SCALES - GENERAL: PAINLEVEL: MODERATE PAIN (5)

## 2017-01-17 NOTE — PATIENT INSTRUCTIONS
January 2017 Sunday Monday Tuesday Wednesday Thursday Friday Saturday   1     2     3     4     Outpatient Visit    8:25 AM   Manpreet Mcdaniel MD   [facility]     LEVEL 0    2:00 PM   (30 min.)   NORTH LAB 2   [facility] Infusion [facility]     RETURN    2:45 PM   (15 min.)   Manpreet Mcdaniel MD   [facility] 5     Outpatient Visit    8:54 AM   [facility]     LEVEL 4   10:00 AM   (240 min.)   SOUTH CHAIR 6   [facility]fusion [facility]     [facility] ONC RETURN    2:00 PM   (30 min.)   Rafita Rogel MD   [facility] Blood and Marrow Transplant 6     7       8     9     IR PICC PLCMT > 5 YRS LEFT    9:00 AM   (60 min.)   [id number]   [facility], Vascular Access     Admission    9:58 AM   Merritt Murray MD   Unit [address] [facility]   (Discharge: 1/13/2017)     XR CHEST PORT 1 VIEW    9:35 PM   (20 min.)   [id number]   [facility],  Radiology 10     XR CHEST PORT 1 VIEW   12:15 PM   (20 min.)   [id number]   [facility],  Radiology 11     12     13     UU CENTRAL VENOUS CATHETER    1:00 PM   (60 min.)   Shivani Hess, RN   [facility], [facility] 14       15     LEVEL 4    8:00 AM   (240 min.)   SOUTH CHAIR 8   [facility] Infusion Center     Outpatient Visit    9:56 AM   [facility] 16     17     [facility] MASONIC LAB DRAW   12:00 PM   (15 min.)    MASONIC LAB DRAW   [facility] Lab Draw     [facility] RETURN   12:30 PM   (50 min.)   Celine Walls PA-C   [facility] ONC INFUSION 120    3:30 PM   (120 min.)   [facility]Y INFUSION   [facility] 18     19     US LWR EXT VENOUS DUPLEX RIGHT    9:10 AM   (30 min.)   SHUS5   [facility] Ultrasound     LEVEL 5   10:00 AM   (300 min.)   NORTH CHAIR 3   [facility]fusion [facility] 20     21       22     LEVEL 5    9:00 AM   (300 min.)   SOUTH CHAIR 2   [facility] and Infusion Center  23     24     LEVEL 5    9:00 AM   (300 min.)   SOUTH CHAIR 2   [facility] 25     26     LEVEL 5    8:30 AM   (300 min.)   NORTH CHAIR 3   [facility] and Infusion Center     [facility] ONC RETURN    3:00 PM   (30 min.)   Rafita Rogel MD   [facility] Blood and Marrow Transplant 27     28       29     LEVEL 5    9:00 AM   (300 min.)   SOUTH CHAIR 2   [facility]fusion [facility] 30     31     LEVEL 5    9:00 AM   (300 min.)   SOUTH CHAIR 1   [facility] Infusion Center                                February 2017 Sunday Monday Tuesday Wednesday Thursday Friday Saturday                  1     2     LEVEL 5    9:30 AM   (300 min.)   NORTH CHAIR 3   [facility] 3     4       5     LEVEL 5    9:00 AM   (300 min.)   SOUTH CHAIR 4   [facility] 6     7     LEVEL 5    9:00 AM   (300 min.)   SOUTH CHAIR 2   [facility] 8     9     10     [facility] MASONIC LAB DRAW    9:00 AM   (15 min.)    MASONIC LAB DRAW   [facility] Lab Draw     [facility] RETURN    9:30 AM   (50 min.)   Celine Walls PA-C   [facility] 360   10:30 AM   (360 min.)   [facility] ONCOLOGY INFUSION   [facility] 11       12     13     14     15     16     17     18       19     20     21     22     23     24     25       26     27     28 March 2017 Sunday Monday Tuesday Wednesday Thursday Friday Saturday                  1     2     3     4       5     6     7     8     9     10     11       12     13     14     15     16     17     18       19     20     21     22     23     24     25       26     27     28     29     30     31

## 2017-01-17 NOTE — Clinical Note
[facility] - CARE MANAGEMENT DEPT  [address] [address]  Phone: [phone]    01/17/2017    Bonny Raymundo  [address]  [facility] [address]        Dear Bonny,  I am the Clinic Care Coordinator that works with your primary care provider's clinic. I wanted to thank you for spending the time to talk with me.  Below is a description of what Clinic Care Coordination is and how I can further assist you.     The Clinic Care Coordinator role is a Registered Nurse and/or  who understands the health care system. The goal of Clinic Care Coordination is to help you manage your health and improve access to the [facility] system in the most efficient manner.  The Registered Nurse can assist you in meeting your health care goals by providing education, coordinating services, and strengthening the communication among your providers. The  can assist you with financial, behavioral, psychosocial, and chemical dependency and counseling/psychiatric resources.    Please feel free to keep this letter and contact information to contact me at [phone] with any further questions or concerns that may arise. We at [facility] are focused on providing you with the highest-quality healthcare experience possible and that all starts with you.       Sincerely,     JACQUELYN Ho, RN, PHN  FPA Care Coordinator      Enclosed: I have enclosed a copy of the Complex Care Plan and an application for [facility] mobility.  Please keep this in an easy to access place to use as needed.               Using Your Patient Care Plan: [facility]  When do I use my care plan?    Emergency room visits: The care plan gives the emergency room staff an overview of your health. And it gives instructions from your doctor about your care.    Hospital: If you bring your care plan, it will take less time to give your health history when you are admitted.    Seeing a  specialist:  Specialists can track changes to your medicines or enter a new diagnosis. You can have them fax the changes to your doctor to update your plan.    Regular or chronic care visits: Review your care plan for any errors before regular visits. Add information you feel would be helpful. (For example, all blood draws should be finger pokes if possible or note that your child cannot sit in a room for very long.)    Caregivers: Give the care plan to all caregivers. This might include your in-home health care team and family members.  How do I make changes to my care plan?  You may write on the care plan. Make changes by crossing out or adding information. Bring the revised care plan when you see your doctor, and share it with your Health Care Home team.  To send plan updates to your Health Care Home team, call, fax, mail or drop off the changes. We will update your care plan and send you a new copy. Please tell us if you need more than one copy. It s a good idea to keep a copy in your home, car, wheel-chair bag or wherever you might need one.

## 2017-01-17 NOTE — PATIENT INSTRUCTIONS
Post Blood Products Discharge Instructions    You have just received a blood product.  During the next 48 hours, please be aware of the following symptoms of a blood product reaction.  If you should experience any of these symptoms call your physician.    -Temperature 100.0 or greater  -Shaking or chills  -Shortness of breath or wheezing  -Headache  -Persistent non-productive cough  -Hives  -Itching  -Extreme weakness  -Facial swelling or flushing  -Red urine    If you experience any of these symptoms, please call triage at  [phone] (Monday through  Friday 8:00 AM-4:30 PM):     If after hours, weekends or holidays, please call:  [facility] page  at [phone] and ask for the doctor on call for Oncology/Hematology or Women's Health service   Or Emergency Department at [phone]             January 2017 Sunday Monday Tuesday Wednesday Thursday Friday Saturday   1     2     3     4     Outpatient Visit    8:25 AM   Manpreet Mcdaniel MD [facility]     LEVEL 0    2:00 PM   (30 min.)   [place] LAB 2   [facility]fusion [facility]     RETURN    2:45 PM   (15 min.)   Manpreet Mcdaniel MD   [facility] 5     Outpatient Visit    8:54 AM   [facility] Lab     LEVEL 4   10:00 AM   (240 min.)   [facility] 6   [facility] Infusion Center     [facility] ONC RETURN    2:00 PM   (30 min.)   Rafita Rogel MD   [facility] Blood and Marrow Transplant 6     7       8     9     IR PICC PLCMT > 5 YRS LEFT    9:00 AM   (60 min.)   UUVAS1   [facility], Vascular Access     Admission    9:58 AM   Merritt Murray MD   Unit 7D [facility]   (Discharge: 1/13/2017)     XR CHEST PORT 1 VIEW    9:35 PM   (20 min.)   UUXR1   [facility],  Radiology 10     XR CHEST PORT 1 VIEW   12:15 PM   (20 min.)   UUXRPP1   [facility],  Radiology 11     12     13     UU CENTRAL VENOUS CATHETER    1:00 PM   (60 min.)   Shivani Hess RN   [facility], Patient  [facility] 14       15     LEVEL 4    8:00 AM   (240 min.)   SOUTH CHAIR 8   [facility] and Infusion Center     Outpatient Visit    9:56 AM   [facility] 16     17     [facility] DRAW   12:00 PM   (15 min.)    [facility] LAB DRAW   [facility] Draw     [facility] RETURN   12:30 PM   (50 min.)   Celine Walls PA-C   [facility]     [facility] ONC INFUSION 120    3:30 PM   (120 min.)   [facility] ON[facility]OGY INFUSION   [facility] 18     19     US LWR EXT VENOUS DUPLEX RIGHT    9:10 AM   (30 min.)   SHUS5   [facility] Ultrasound     LEVEL 5   10:00 AM   (300 min.)   NORTH CHAIR 3   [facility] Infusion Center 20     21       22     LEVEL 5    9:00 AM   (300 min.)   SOUTH CHAIR 2   [facility] Infusion Center 23     24     LEVEL 5    9:00 AM   (300 min.)   SOUTH CHAIR 2   [facility] Infusion Center 25     26     LEVEL 5    8:30 AM   (300 min.)   NORTH CHAIR 3   [facility] Infusion Center     UMP ONC RETURN    3:00 PM   (30 min.)   Rafita Rogel MD   [facility] Blood and Marrow Transplant 27     28       29     LEVEL 5    9:00 AM   (300 min.)   SOUTH CHAIR 2   [facility]fusion [facility] 30     31     LEVEL 5    9:00 AM   (300 min.)   SOUTH CHAIR 1   [facility] Infusion Center                                February 2017 Sunday Monday Tuesday Wednesday Thursday Friday Saturday                  1     2     LEVEL 5    9:30 AM   (300 min.)   NORTH CHAIR 3   [facility]fusion [facility] 3     4       5     LEVEL 5    9:00 AM   (300 min.)   SOUTH CHAIR 4   [facility]fusion Center 6     7     LEVEL 5    9:00 AM   (300 min.)   SOUTH CHAIR 2   [facility] 8     9     10     [facility] DRAW    9:00 AM   (15 min.)    [facility] LAB DRAW   [facility] Draw     UMP RETURN     9:30 AM   (50 min.)   Celine Walls PA-C   [facility]     [facility] ONC INFUSION 360   10:30 AM   (360 min.)   UC ONCOLOGY INFUSION   [facility] 11       12     13     14     15     16     17     18       19     20     21     22     23     24     25       26     27     28                                     Recent Results (from the past 24 hour(s))   Basic metabolic panel  (Ca, Cl, CO2, Creat, Gluc, K, Na, BUN)    Collection Time: 01/17/17 12:46 PM   Result Value Ref Range    Sodium 140 133 - 144 mmol/L    Potassium 3.8 3.4 - 5.3 mmol/L    Chloride 107 94 - 109 mmol/L    Carbon Dioxide 25 20 - 32 mmol/L    Anion Gap 9 3 - 14 mmol/L    Glucose 132 (H) 70 - 99 mg/dL    Urea Nitrogen 35 (H) 7 - 30 mg/dL    Creatinine 0.80 0.52 - 1.04 mg/dL    GFR Estimate 70 >60 mL/min/1.7m2    GFR Estimate If Black 85 >60 mL/min/1.7m2    Calcium 8.8 8.5 - 10.1 mg/dL   CBC with platelets differential    Collection Time: 01/17/17  1:23 PM   Result Value Ref Range    WBC 2.2 (L) 4.0 - 11.0 10e9/L    RBC Count 2.70 (L) 3.8 - 5.2 10e12/L    Hemoglobin 8.4 (L) 11.7 - 15.7 g/dL    Hematocrit 24.4 (L) 35.0 - 47.0 %    MCV 90 78 - 100 fl    MCH 31.1 26.5 - 33.0 pg    MCHC 34.4 31.5 - 36.5 g/dL    RDW 19.2 (H) 10.0 - 15.0 %    Platelet Count 5 (LL) 150 - 450 10e9/L    Diff Method Manual Differential     % Neutrophils 63.3 %    % Lymphocytes 28.4 %    % Monocytes 2.8 %    % Eosinophils 0.9 %    % Basophils 0.0 %    % Myelocytes 0.9 %    % Plasma Cells 3.7 %    Nucleated RBCs 6 (H) 0 /100    Absolute Neutrophil 1.4 (L) 1.6 - 8.3 10e9/L    Absolute Lymphocytes 0.6 (L) 0.8 - 5.3 10e9/L    Absolute Monocytes 0.1 0.0 - 1.3 10e9/L    Absolute Eosinophils 0.0 0.0 - 0.7 10e9/L    Absolute Basophils 0.0 0.0 - 0.2 10e9/L    Absolute Myelocytes 0.0 0 10e9/L    Absolute Plasma Cells 0.1 (H) 0 10e9/L    Absolute Nucleated RBC 0.1     Anisocytosis Moderate     Poikilocytosis Slight    ABO/Rh type and screen     Collection Time: 01/17/17  1:24 PM   Result Value Ref Range    ABO Pending     RH(D) Pending     Antibody Screen Pending     Test Valid Only At       [facility]    Specimen Expires 01/20/2017    Platelets prepare order unit    Collection Time: 01/17/17  2:17 PM   Result Value Ref Range    Ordered Component Type PLT Pheresis     Units Ordered 1    Blood component    Collection Time: 01/17/17  2:17 PM   Result Value Ref Range    Unit Number [id number]     Blood Component Type PlateletPheresis,LeukoRed Irrad (Part 2)     Division Number 00     Status of Unit Released to care unit 01/17/2017 1503     Blood Product Code [id number]     Unit Status ISS

## 2017-01-17 NOTE — MR AVS SNAPSHOT
After Visit Summary   1/17/2017    Bonny Raymundo    MRN: [id number]           Patient Information     Date Of Birth          1945        Visit Information        Provider Department      1/17/2017 12:30 PM Celine Walls PA-C [facility]        Today's Diagnoses     Idiopathic aplastic anemia (H)    -  1     Left-sided low back pain without sciatica, unspecified chronicity         Constipation, unspecified constipation type         Swelling of right lower extremity           Care Instructions          January 2017 Sunday Monday Tuesday Wednesday Thursday Friday Saturday   1     2     3     4     Outpatient Visit    8:25 AM   Manpreet Mcdaniel MD   [facility] Lab     LEVEL 0    2:00 PM   (30 min.)   [place] LAB 2   [facility] Infusion Center     RETURN    2:45 PM   (15 min.)   Manpreet Mcdaniel MD   [facility] 5     Outpatient Visit    8:54 AM   [facility]     LEVEL 4   10:00 AM   (240 min.)   SOUTH CHAIR 6   [facility] Infusion Center     [facility] ONC RETURN    2:00 PM   (30 min.)   Rafita Rogel MD   [facility] Blood and Marrow Transplant 6     7       8     9     IR PICC PLCMT > 5 YRS LEFT    9:00 AM   (60 min.)   UUVAS1   [facility], Vascular Access     Admission    9:58 AM   Merritt Murray MD   Unit 7D [facility]   (Discharge: 1/13/2017)     XR CHEST PORT 1 VIEW    9:35 PM   (20 min.)   UUXR1   [facility],  Radiology 10     XR CHEST PORT 1 VIEW   12:15 PM   (20 min.)   UUXRPP1   [facility],  Radiology 11     12     13     UU CENTRAL VENOUS CATHETER    1:00 PM   (60 min.)   Shivani Hess, RN   [facility] 14       15     LEVEL 4    8:00 AM   (240 min.)   SOUTH CHAIR 8   [facility] Infusion Center     Outpatient Visit    9:56 AM   [facility] 16     17     [facility] DRAW   12:00 PM   (15 min.)    [facility] LAB DRAW   [facility] Draw     [facility] RETURN   12:30 PM   (50 min.)   Celine Walls PA-C   [facility]     [facility] ONC INFUSION 120    3:30 PM   (120 min.)   UC ONCOLOGY INFUSION   [facility] 18     19     US LWR EXT VENOUS DUPLEX RIGHT    9:10 AM   (30 min.)   SHUS5   [facility] Ultrasound     LEVEL 5   10:00 AM   (300 min.)   NORTH CHAIR 3   [facility] Infusion [facility] 20     21       22     LEVEL 5    9:00 AM   (300 min.)   SOUTH CHAIR 2   [facility] Infusion Center 23     24     LEVEL 5    9:00 AM   (300 min.)   SOUTH CHAIR 2   [facility] Infusion [facility] 25     26     LEVEL 5    8:30 AM   (300 min.)   NORTH CHAIR 3   [facility] Infusion Center     P ONC RETURN    3:00 PM   (30 min.)   Rafita Rogel MD   [facility] Blood and Marrow Transplant 27     28       29     LEVEL 5    9:00 AM   (300 min.)   SOUTH CHAIR 2   [facility]fusion [facility] 30     31     LEVEL 5    9:00 AM   (300 min.)   SOUTH CHAIR 1   [facility]fusion [facility]                                February 2017 Sunday Monday Tuesday Wednesday Thursday Friday Saturday                  1     2     LEVEL 5    9:30 AM   (300 min.)   NORTH CHAIR 3   [facility] 3     4       5     LEVEL 5    9:00 AM   (300 min.)   SOUTH CHAIR 4   [facility]fusion [facility] 6     7     LEVEL 5    9:00 AM   (300 min.)   SOUTH CHAIR 2   [facility]fusion [facility] 8     9     10     [facility] LAB DRAW    9:00 AM   (15 min.)    [facility] DRAW   [facility]     UMP RETURN    9:30 AM   (50 min.)   Celine Walls PA-C   [facility]     [facility] ONC INFUSION 360   10:30 AM   (360 min.)   UC ONCOLOGY INFUSION   [facility] 11       12     13     14     15     16     17     18       19     20     21     22      23     24     25       26     27     28 March 2017 Sunday Monday Tuesday Wednesday Thursday Friday Saturday                  1     2     3     4       5     6     7     8     9     10     11       12     13     14     15     16     17     18       19     20     21     22     23     24     25       26     27     28     29     30     31                         Follow-ups after your visit        Your next 10 appointments already scheduled     Jan 17, 2017  3:30 PM   Infusion 120 with UC ONCOLOGY INFUSION, UC 31 ATC   [facility])    [address]  2nd Floor  [facility] [facility] [id number] [phone]            Jan 19, 2017  9:10 AM   US LOWER EXTREMITY VENOUS DUPLEX RIGHT with SHUS5   [facility] Ultrasound ([facility])    [address] [facility] [address] [phone]           Please bring a list of your medicines (including vitamins, minerals and over-the-counter drugs). Also, tell your doctor about any allergies you may have. Wear comfortable clothes and leave your valuables at home.  You do not need to do anything special to prepare for your exam.  Please call the Imaging Department at your exam site with any questions.            Jan 19, 2017 10:00 AM   Level 5 with NORTH CHAIR 3   [facility] Clinic [facility] Infusion Center ([facility])    [facility]  [address]  [place] [address]   [phone]            Jan 22, 2017  9:00 AM   Level 5 with SOUTH CHAIR 2   [facility] ([facility])    [facility]  [address]  [facility] [address]   [phone]            Jan 24, 2017  9:00 AM   Level 5 with SOUTH CHAIR 2   [facility] ([facility])    [facility]  [address]  [place] [address]   [phone]            Jan 26, 2017  8:30 AM   Level 5 with NORTH CHAIR 3   [facility] Infusion [facility] ([facility])    [facility] [place]  [address]  [place] [address]   [phone]            Jan 26, 2017  3:00 PM   RETURN ONC with Rafita Rogel MD   [facility] Blood and Marrow Transplant ([facility])    [address]  2nd Floor  [facility] [facility] [address]   [phone]            Jan 29, 2017  9:00 AM   Level 5 with SOUTH CHAIR 2   [facility]fusion [facility] ([facility])    [facility] [place]  [address]  [place] [address]   [phone]            Jan 31, 2017  9:00 AM   Level 5 with SOUTH CHAIR 1   [facility] ([facility])    [facility]  [address]  [facility] [address]   [phone]              Future tests that were ordered for you today     Open Standing Orders        Priority Remaining Interval Expires Ordered    Platelets prepare order unit Routine 99/100 CONDITIONAL (SPECIFY) BLOOD  1/17/2017    Transfuse platelets unit Routine 100/100 TRANSFUSE 1 DOSE  1/17/2017          Open Future Orders        Priority Expected Expires Ordered    US Lower Extremity Venous Duplex RT Routine  1/17/2018 1/17/2017            Who to contact     If you have questions or need follow up information about today's clinic visit or your schedule please contact [facility] CANCER [facility] directly at [phone].  Normal or non-critical lab and imaging results will be communicated to you by MyChart, letter or phone within 4 business days after the clinic has received the results. If you do not hear from us within 7 days, please contact the clinic through MyChart or phone. If you have a critical or abnormal lab result, we will notify you by phone as  "soon as possible.  Submit refill requests through [employer] or call your pharmacy and they will forward the refill request to us. Please allow 3 business days for your refill to be completed.          Additional Information About Your Visit        MeetMe[employer]ix[employer]ield Information     [employer] lets you send messages to your doctor, view your test results, renew your prescriptions, schedule appointments and more. To sign up, go to [email or web address] . Click on \"Log in\" on the left side of the screen, which will take you to the Welcome page. Then click on \"Sign up Now\" on the right side of the page.     You will be asked to enter the access code listed below, as well as some personal information. Please follow the directions to create your username and password.     Your access code is: [email or web address]  Expires: 3/15/2017 11:47 AM     Your access code will  in 90 days. If you need help or a new code, please call your [place] clinic or [phone].        Care EveryWhere ID     This is your Care EveryWhere ID. This could be used by other organizations to access your [place] medical records  [id number]        Your Vitals Were     Pulse Temperature Respirations Height BMI (Body Mass Index) Pulse Oximetry    91 97.8  F (36.6  C) 16 1.6 m (5' 3\") 24.06 kg/m2 99%       Blood Pressure from Last 3 Encounters:   17 109/63   01/15/17 121/80   17 145/83    Weight from Last 3 Encounters:   17 61.598 kg (135 lb 12.8 oz)   17 64.638 kg (142 lb 8 oz)   17 62.506 kg (137 lb 12.8 oz)              We Performed the Following     ABO/Rh type and screen     CBC with platelets differential          Today's Medication Changes          These changes are accurate as of: 17  2:19 PM.  If you have any questions, ask your nurse or doctor.               Start taking these medicines.        Dose/Directions    lidocaine 5 % Patch   Commonly known as:  LIDODERM   Used for:  Left-sided low back pain without sciatica, " unspecified chronicity   Started by:  Celine Walls PA-C        Apply up to 3 patches to painful area at once for up to 12 h within a 24 h period.  Remove after 12 hours.   Quantity:  30 patch   Refills:  0       oxyCODONE 5 MG IR tablet   Commonly known as:  ROXICODONE   Used for:  Left-sided low back pain without sciatica, unspecified chronicity   Started by:  Celine Walls PA-C        Dose:  5 mg   Take 1 tablet (5 mg) by mouth every 6 hours as needed for moderate to severe pain   Quantity:  50 tablet   Refills:  0       senna-docusate 8.6-50 MG per tablet   Commonly known as:  SENNA S   Used for:  Left-sided low back pain without sciatica, unspecified chronicity, Constipation, unspecified constipation type   Started by:  Celine Walls PA-C        Dose:  1-2 tablet   Take 1-2 tablets by mouth 2 times daily as needed for constipation   Quantity:  100 tablet   Refills:  1            Where to get your medicines      These medications were sent to [address] [address]    Hours:  TRANSPLANT PHONE NUMBER [phone] Phone:  [phone]    - senna-docusate 8.6-50 MG per tablet      Some of these will need a paper prescription and others can be bought over the counter.  Ask your nurse if you have questions.     Bring a paper prescription for each of these medications    - oxyCODONE 5 MG IR tablet      Call your pharmacy to confirm that your medication is ready for pickup. It may take up to 24 hours for them to receive the prescription. If the prescription is not ready within 3 business days, please contact your clinic or your provider.     We will let you know when these medications are ready. If you don't hear back within 3 business days, please contact us.    - lidocaine 5 % Patch             Primary Care Provider Office Phone # Fax #    Shivani Phelps PA-C [phone]        [facility] [address] [facility] [address]  [facility] [address]        Thank you!     Thank you for choosing [facility]  for your care. Our goal is always to provide you with excellent care. Hearing back from our patients is one way we can continue to improve our services. Please take a few minutes to complete the written survey that you may receive in the mail after your visit with us. Thank you!             Your Updated Medication List - Protect others around you: Learn how to safely use, store and throw away your medicines at www.disposemymeds.org.          This list is accurate as of: 1/17/17  2:19 PM.  Always use your most recent med list.                   Brand Name Dispense Instructions for use    amLODIPine 5 MG tablet    NORVASC    30 tablet    Take 1 tablet (5 mg) by mouth daily       BENADRYL ALLERGY PO      Take 25 mg by mouth At Bedtime       calcium-vitamin D 600-400 MG-UNIT per tablet    CALTRATE     Take 1 tablet by mouth daily.       chlorpheniramine 4 MG tablet    CHLOR-TRIMETON     Take 4 mg by mouth every 6 hours as needed. For head cold       COMPRESSION STOCKINGS     2 each    Wear compression stockings at 20-30 mmHg rating most time during the day to the affected leg (left leg) or both legs. Take them off at night.       * cycloSPORINE modified capsule     120 capsule    Take 2 capsules (200 mg) by mouth 2 times daily Along with two 25 mg capsules       * cycloSPORINE modified 25 MG capsule    GENERIC EQUIVALENT    120 capsule    Take 2 capsules (50 mg) by mouth 2 times daily Along with two 100 mg capsules       * cycloSPORINE modified 100 MG capsule    GENERIC EQUIVALENT         diclofenac 1 % Gel topical gel    VOLTAREN    100 g    Apply 2 g topically 2 times daily       eltrombopag 50 MG tablet    PROMACTA    30 tablet    Take 1 tablet (50 mg) by mouth daily Administer on an empty stomach, 1 hour before or 2 hours after a meal.       fluconazole 200 MG tablet     DIFLUCAN    30 tablet    Take 1 tablet (200 mg) by mouth daily       levofloxacin 500 MG tablet    LEVAQUIN    30 tablet    Take 1 tablet (500 mg) by mouth daily       lidocaine 5 % Patch    LIDODERM    30 patch    Apply up to 3 patches to painful area at once for up to 12 h within a 24 h period.  Remove after 12 hours.       loratadine 10 MG tablet    CLARITIN     Take 10 mg by mouth daily as needed. For head cold       MULTIVITAMIN TABS   OR      1 tablet daily       order for DME     1 Box    Equipment being ordered: knee high compression stockings- 18-20 mm       oxyCODONE 5 MG IR tablet    ROXICODONE    50 tablet    Take 1 tablet (5 mg) by mouth every 6 hours as needed for moderate to severe pain       pantoprazole 40 MG EC tablet    PROTONIX    30 tablet    Take 1 tablet (40 mg) by mouth every morning       predniSONE 20 MG tablet    DELTASONE    42 tablet    Take 3 tablets (60 mg) by mouth daily       senna-docusate 8.6-50 MG per tablet    SENNA S    100 tablet    Take 1-2 tablets by mouth 2 times daily as needed for constipation       TYLENOL PO      Take 325 mg by mouth every 6 hours as needed for mild pain or fever       * Notice:  This list has 3 medication(s) that are the same as other medications prescribed for you. Read the directions carefully, and ask your doctor or other care provider to review them with you.

## 2017-01-17 NOTE — NURSING NOTE
"Bonny Raymundo is a 71 year old female who presents for:  Chief Complaint   Patient presents with     Blood Draw     labs drawn from PICC line by RN     Oncology Clinic Visit     Patient is seeing provider for f/u after been hopsitalized for 5 days        Initial Vitals:  /63 mmHg  Pulse 91  Temp(Src) 97.8  F (36.6  C)  Resp 16  Ht 1.6 m (5' 3\")  Wt 61.598 kg (135 lb 12.8 oz)  BMI 24.06 kg/m2  SpO2 99% Estimated body mass index is 24.06 kg/(m^2) as calculated from the following:    Height as of this encounter: 1.6 m (5' 3\").    Weight as of this encounter: 61.598 kg (135 lb 12.8 oz).. Body surface area is 1.65 meters squared. BP completed using cuff size: NA (Not Taken)  Moderate Pain (5) No LMP recorded. Patient has had a hysterectomy. Allergies and medications reviewed.     Medications: Medication refills not needed today.  Pharmacy name entered into [facility]: [facility] [address], [address]    Comments: Patient decided to not go through med list today. Patient complained of lower back pain 5/10. Vitals taken in lab today     6 minutes for nursing intake (face to face time)   Lucia Jones LPN        "

## 2017-01-17 NOTE — Clinical Note
1/17/2017       RE: Bonny Raymundo  [address]  [facility] [address]     Dear Colleague,    Thank you for referring your patient, Bonny Raymundo, to the [facility]. Please see a copy of my visit note below.    Chief Complaint   Patient presents with     Blood Draw     labs drawn from PICC line by RN     Patient did not hold her cyclosporine today.  Labs drawn from purple lumen of PICC line, flushed with NS.  Patient checked in for provider visit.  Shivani Ricketts RN          Again, thank you for allowing me to participate in the care of your patient.      Sincerely,    Celine Walls PA-C

## 2017-01-17 NOTE — TELEPHONE ENCOUNTER
MTM referral from: Transitions of Care (recent hospital discharge or ED visit)    MTM referral outreach attempt #1 on January 17, 2017 at 1:15 PM      Outcome: No Answer - voicemail box full    ALYX Pizano Coordinator Intern

## 2017-01-17 NOTE — PROGRESS NOTES
Infusion Nursing Note:  Bonny Raymundo presents today for 1 dose platelets.    Patient seen by provider today: Yes: DOROTA Patricia    Treatment Conditions:  HGB      8.4   1/17/2017  WBC      2.2   1/17/2017   ANEU      1.4   1/17/2017  PLT        5   1/17/2017     NA      140   1/17/2017                POTASSIUM      3.8   1/17/2017        CR     0.80   1/17/2017                LEO      8.8   1/17/2017               Intravenous Access:  PICC.  Access left intact at time of discharge.      Note:   PICC dressing change performed using sterile technique.  Side C/D/I.  Results reviewed, copy given to patient.  Proceed with treatment.    Copy of AVS given to patient. + Blood return from DBL LUMEN PICC.  Tolerated infusion without incident.  Prescriptions filled today.   D/C in care of self.  Pt will return 1/19 to [facility] for U/S and possible transfusions.       Shaunna Parson RN

## 2017-01-17 NOTE — PROGRESS NOTES
Clinic Care Coordination Contact  OUTREACH    Referral Information:  Referral Source: CTS  Reason for Contact: idiopathic aplastic anemia, pancytopenia, lives alone and limited support  Care Conference: No     Universal Utilization:   ED Visits in last year: 1  Hospital visits in last year: 1  Last PCP appointment: 12/29/16  Missed Appointments: 0  Concerns: None  Multiple Providers or Specialists: Yes, hematology  Upcoming appointment: 01/17/17 (with hematology and for labs)    Clinical Concerns:  Current Medical Concerns: aplastic anemia, pancytopenia    Current Behavioral Concerns: Transportation    Education Provided to patient: Patient advised to contact [employer] to see if she qualifies.   Clinical Pathway Name: None  Clinical Pathway: None    Medication Management:  Self manages.  She sets up one day at a time in a tray.  She does not use a pill box as all of her pills will not fit in a regular pill box.  She has 7 pills to take in the am, one mid afternoon and 7 pills in the pm.  She takes them at 8 am,  Mid afternoon and at 8 pm.       Functional Status:  Mobility Status: Independent  Equipment Currently Used at Home: walker, rolling  Transportation: Drives but not in bad weather or if her back is bothering her.  Would like to see if she qualifies for metro mobility. She does have friends who drive her if they are available.  She said sometimes she takes a taxi.  Her infusions can last 6 hours so she feels it is a lot to ask someone to wait that long to drive her home.   Independent with all ADL/IADL's  Cognitive Function : Alert and oriented          Psychosocial:  Current living arrangement:: I live alone, I live in a private home  Financial/Insurance: [facility]  Communication Barrier: None  Support System Patient/Caregivers: Friends, her son lives in [place] but will be moving back to [place] to live with her maybe in March 2017.       Resources and Interventions:  Current Resources:     Home  Care: [facility] On hold per patient as she isn't sure she needs home care.  They will be calling her back at the end of the week        Advanced Care Plans/Directives on file:: No, will discuss at a later date.  Referrals Placed: Transportation     Goals:   Goal 1 Statement: I will contact [place] transportation and review metro mobility application by 1/31/2017  Goal 1 Progression Percent: 50%  Goal 1 Progression Date: 01/17/17              Barriers: Transportation  Strengths: able to voice her concerns and needs, following up appropriately with hematology    Patient/Caregiver understanding: Bonny states she is doing okay since discharge.  She said she has a hematology appointment today at noon.  She will have labs checked as well.  She said she has not had to have a blood transfusion for a few days.  She went home with PICC line.  She said home care has contacted her but she has them on hold until the end of the week.  She said she doesn't think she really need home care.  She said she needs help with occasional transportation, food delivery and light housekeeping.  She said she wishes there was one agency that offered all those services.  She said she normally does drive but not if the weather is bad or if her back is bothering her.  She said she has friends who will drive her if available.  She said her son is planning on moving back to [place] to live with her but probably not until March.  She said that Dr. Rogel her hematologist as been her primary care provider.  She said she is waiting for delivery of Promacta which is coming from the .  She received a free supply.  She said she has been looking into food deliver options. She has been researching [employer].  We briefly discussed Store-to-Door.      Frequency of Care Coordination: Every 2-3 weeks or as needed       Plan:     1) Bonny will follow up as scheduled with hematology and for labs today at noon.    2) Bonny will contact [employer]  transportation to see if she qualifies for their service.    3) RN CC sent staff message to Светлана Nolasco RN who is Bonny's hematology care coordinator.  I introduced myself and updated her on resources that were given.  I encouraged her to contact me if I can be of further assistance.    4) RN CC mailed care plan, care coordination information sheet, and metro mobility application.  I will follow up with her in 1-2 weeks.    Kathy Mcqueen BS, RN, PHN  [facility] Care Coordinator  [phone]  January 17, 2017

## 2017-01-17 NOTE — Clinical Note
[facility]  Complex Care Plan  About Me  Patient Name:  Bonny Raymundo    YOB: 1945  Age:   71 year old   [place] MRN: [id number] Telephone Information:     Home Phone [phone]   Mobile none       Address:    [address]  [place] [address] Email address:  No e-mail address on record      Emergency Contact(s)  Name Relationship Lgl Grd Work Phone Home Phone Mobile Phone   1. LAZARO PERKINS* Brother  none [phone] none   2. ROSEANN RAYMUNDO Son  none [phone]           Primary language:  English     needed? No   [employer]:  [phone] op. 1  Other communication barriers: No  Preferred Method of Communication:  Phone  Current living arrangement: I live alone, I live in a private home  Mobility Status/ Medical Equipment: Independent  Other information to know about me:    Health Maintenance  Health Maintenance Reviewed: Due/Overdue:  Our records indicate you are due for the below listed health maintenance items.  Please let us know if you have completed them if not you can call [phone] to schedule a visit to complete them.    Health Maintenance Due   Topic Date Due     MAMMO SCREEN Q2 YR (SYSTEM ASSIGNED)  02/20/2016     FIT Q1 YR (NO INBASKET)  05/11/2016         My Access Plan  Medical Emergency 911   Primary Clinic Line [facility]- [phone]   24 Hour Appointment Line [phone] or  [phone]) (toll-free)   24 Hour Nurse Line [phone] (toll-free)   Preferred Urgent Care [facility], [phone]   Preferred Hospital [facility]  [phone]   Preferred Pharmacy [facility] [address], [address]     Behavioral Health Crisis Line [facility], [phone] or 911     My Care Team Members  Patient Care Team       Relationship Specialty Notifications Start End     Shivani Phelps PA-C PCP - General Internal Medicine  2/4/14     Phone: [phone] Fax: [phone]         [facility] [address] [facility] [address]    Aly Elliott MD MD Family Practice  9/30/15     Phone: [phone] Fax: [phone]          [facility] [address]  [facility] [address]    Rafita Rogel MD MD Internal Medicine Admissions 1/6/17     Phone: [phone] Fax: [phone]          [facility] [address]  [facility] [address]    Светлана Nolasco, RN Nurse Coordinator Hematology & Oncology Admissions 1/6/17     Phone: [phone]         Kathy Mcqueen RN Case Manager   1/17/17     Comment:  Through Shivani Phelps's office    Phone: [phone] Fax: [phone]             My Care Plans  Self Management and Treatment Plan  Goals and (Comments)  Goal #1: I will contact [facility] and review metro mobility application by 1/31/2017      50% of goal reached    Goal #2:          of goal reached    Goal #3:          of goal reached    Goal #4:         of goal reached     Goal #5:          of goal reached  -  Goal #6:          of goal reached    Goal #7:          of goal reached    Goal #8:           of goal reached        Goal #9:          of goal reached    Goal #10:        of goal reached    Action Plans on File: None  Advance Care Plans/Directives Type:   Type Advanced Care Plans/Directives: Other    My Medical and Care Information  Problem List   Patient Active Problem List   Diagnosis     DIARRHEA     Esophageal reflux     Chest pain     Osteoporosis     CARDIOVASCULAR SCREENING; LDL GOAL LESS THAN 160     Seasonal allergic rhinitis     PE (pulmonary embolism)     Health Care Home     Sensorineural hearing loss of both ears     BPPV (benign paroxysmal positional vertigo)     Anemia     DVT, recurrent, lower extremity, acute (H)     Pulmonary emboli (H)     Advanced directives, counseling/discussion      Pancytopenia (H)     Aplastic anemia (H)      Current Medications and Allergies:  See printed Medication Report.    Care Coordination Start Date: 01/17/17   Frequency of Care Coordination: Every 2-3 weeks or as needed   Form Last Updated: 01/17/2017

## 2017-01-17 NOTE — MR AVS SNAPSHOT
After Visit Summary   1/17/2017    Bonny Raymundo    MRN: [id number]           Patient Information     Date Of Birth          1945        Visit Information        Provider Department      1/17/2017 3:30 PM UC 31 ATC;  ONCOLOGY INFUSION [facility]        Today's Diagnoses     Pancytopenia (H)    -  1       Care Instructions    Post Blood Products Discharge Instructions    You have just received a blood product.  During the next 48 hours, please be aware of the following symptoms of a blood product reaction.  If you should experience any of these symptoms call your physician.    -Temperature 100.0 or greater  -Shaking or chills  -Shortness of breath or wheezing  -Headache  -Persistent non-productive cough  -Hives  -Itching  -Extreme weakness  -Facial swelling or flushing  -Red urine    If you experience any of these symptoms, please call triage at  [phone] (Monday through  Friday 8:00 AM-4:30 PM):     If after hours, weekends or holidays, please call:  [facility] page  at [phone] and ask for the doctor on call for Oncology/Hematology or Women's Health service   Or Emergency Department at [phone]             January 2017 Sunday Monday Tuesday Wednesday Thursday Friday Saturday   1     2     3     4     Outpatient Visit    8:25 AM   Mnapreet Mcdaniel MD [facility]     LEVEL 0    2:00 PM   (30 min.)   [place] [facility] 2   [facility]     RETURN    2:45 PM   (15 min.)   Manpreet Mcdaniel MD   [facility] 5     Outpatient Visit    8:54 AM   [facility]     LEVEL 4   10:00 AM   (240 min.)   SOUTH CHAIR 6   [facility]     [facility] ONC RETURN    2:00 PM   (30 min.)   Rafita Rogel MD   [facility] Blood and Marrow Transplant 6     7       8     9     IR PICC PLCMT > 5 YRS LEFT    9:00 AM   (60 min.)   UUVAS1   [facility], Vascular Access     Admission    9:58 AM    Merritt Murray MD   Unit 7D [facility]   (Discharge: 1/13/2017)     XR CHEST PORT 1 VIEW    9:35 PM   (20 min.)   UUXR1   [facility],  Radiology 10     XR CHEST PORT 1 VIEW   12:15 PM   (20 min.)   UUXRPP1   [facility],  Radiology 11     12     13     UU CENTRAL VENOUS CATHETER    1:00 PM   (60 min.)   Shivani Hess RN   [facility] [place] 14       15     LEVEL 4    8:00 AM   (240 min.)   SOUTH CHAIR 8   [facility] Infusion Center     Outpatient Visit    9:56 AM   [facility] Lab 16     17     [facility] DRAW   12:00 PM   (15 min.)    [facility] DRAW   [facility] Lab Draw     [facility] RETURN   12:30 PM   (50 min.)   Celine Walls PA-C   [facility] ONC INFUSION 120    3:30 PM   (120 min.)    ONCOLOGY INFUSION   [facility] 18     19     US LWR EXT VENOUS DUPLEX RIGHT    9:10 AM   (30 min.)   SHUS5   [facility] Ultrasound     LEVEL 5   10:00 AM   (300 min.)   NORTH CHAIR 3   [facility] 20     21       22     LEVEL 5    9:00 AM   (300 min.)   SOUTH CHAIR 2   [facility] 23     24     LEVEL 5    9:00 AM   (300 min.)   SOUTH CHAIR 2   [facility] 25     26     LEVEL 5    8:30 AM   (300 min.)   NORTH CHAIR 3   [facility]     [facility] ONC RETURN    3:00 PM   (30 min.)   Rafita Rogel MD   [facility] Blood and Marrow Transplant 27     28       29     LEVEL 5    9:00 AM   (300 min.)   SOUTH CHAIR 2   [facility] 30     31     LEVEL 5    9:00 AM   (300 min.)   SOUTH CHAIR 1   [facility]                                February 2017 Sunday Monday Tuesday Wednesday Thursday Friday Saturday                  1     2     LEVEL 5    9:30 AM   (300 min.)   NORTH CHAIR 3   [facility] and  Infusion Center 3     4       5     LEVEL 5    9:00 AM   (300 min.)   SOUTH CHAIR 4   [facility] Infusion Center 6     7     LEVEL 5    9:00 AM   (300 min.)   SOUTH CHAIR 2   [facility]fusion Center 8     9     10     [facility] DRAW    9:00 AM   (15 min.)    [facility] DRAW   [facility] Lab Draw     P RETURN    9:30 AM   (50 min.)   Celine Walls PA-C   [facility] ONC INFUSION 360   10:30 AM   (360 min.)    ONCOLOGY INFUSION   [facility] 11       12     13     14     15     16     17     18       19     20     21     22     23     24     25       26     27     28                                     Recent Results (from the past 24 hour(s))   Basic metabolic panel  (Ca, Cl, CO2, Creat, Gluc, K, Na, BUN)    Collection Time: 01/17/17 12:46 PM   Result Value Ref Range    Sodium 140 133 - 144 mmol/L    Potassium 3.8 3.4 - 5.3 mmol/L    Chloride 107 94 - 109 mmol/L    Carbon Dioxide 25 20 - 32 mmol/L    Anion Gap 9 3 - 14 mmol/L    Glucose 132 (H) 70 - 99 mg/dL    Urea Nitrogen 35 (H) 7 - 30 mg/dL    Creatinine 0.80 0.52 - 1.04 mg/dL    GFR Estimate 70 >60 mL/min/1.7m2    GFR Estimate If Black 85 >60 mL/min/1.7m2    Calcium 8.8 8.5 - 10.1 mg/dL   CBC with platelets differential    Collection Time: 01/17/17  1:23 PM   Result Value Ref Range    WBC 2.2 (L) 4.0 - 11.0 10e9/L    RBC Count 2.70 (L) 3.8 - 5.2 10e12/L    Hemoglobin 8.4 (L) 11.7 - 15.7 g/dL    Hematocrit 24.4 (L) 35.0 - 47.0 %    MCV 90 78 - 100 fl    MCH 31.1 26.5 - 33.0 pg    MCHC 34.4 31.5 - 36.5 g/dL    RDW 19.2 (H) 10.0 - 15.0 %    Platelet Count 5 (LL) 150 - 450 10e9/L    Diff Method Manual Differential     % Neutrophils 63.3 %    % Lymphocytes 28.4 %    % Monocytes 2.8 %    % Eosinophils 0.9 %    % Basophils 0.0 %    % Myelocytes 0.9 %    % Plasma Cells 3.7 %    Nucleated RBCs 6 (H) 0 /100    Absolute Neutrophil 1.4 (L) 1.6 - 8.3 10e9/L    Absolute  Lymphocytes 0.6 (L) 0.8 - 5.3 10e9/L    Absolute Monocytes 0.1 0.0 - 1.3 10e9/L    Absolute Eosinophils 0.0 0.0 - 0.7 10e9/L    Absolute Basophils 0.0 0.0 - 0.2 10e9/L    Absolute Myelocytes 0.0 0 10e9/L    Absolute Plasma Cells 0.1 (H) 0 10e9/L    Absolute Nucleated RBC 0.1     Anisocytosis Moderate     Poikilocytosis Slight    ABO/Rh type and screen    Collection Time: 01/17/17  1:24 PM   Result Value Ref Range    ABO Pending     RH(D) Pending     Antibody Screen Pending     Test Valid Only At       [facility]    Specimen Expires 01/20/2017    Platelets prepare order unit    Collection Time: 01/17/17  2:17 PM   Result Value Ref Range    Ordered Component Type PLT Pheresis     Units Ordered 1    Blood component    Collection Time: 01/17/17  2:17 PM   Result Value Ref Range    Unit Number [id number]     Blood Component Type PlateletPheresis,LeukoRed Irrad (Part 2)     Division Number 00     Status of Unit Released to care unit 01/17/2017 1503     Blood Product Code [id number]     Unit Status ISS                  Follow-ups after your visit        Your next 10 appointments already scheduled     Jan 17, 2017  3:30 PM   Infusion 120 with  ONCOLOGY INFUSION, UC 31 ATC   [facility] ([facility])    [address] [id number] [phone]            Jan 19, 2017  9:10 AM   US LOWER EXTREMITY VENOUS DUPLEX RIGHT with SHUS5   [facility]trasound ([facility])    [address] [place] [id number] [phone]           Please bring a list of your medicines (including vitamins, minerals and over-the-counter drugs). Also, tell your doctor about any allergies you may have. Wear comfortable clothes and leave your valuables at home.  You do not need to do anything special to prepare for your exam.  Please call the Imaging Department at your exam site with any  questions.            Jan 19, 2017 10:00 AM   Level 5 with NORTH CHAIR 3   [facility] ([facility])    [facility]  [address]  [place] [address]   [phone]            Jan 22, 2017  9:00 AM   Level 5 with SOUTH CHAIR 2   [facility] ([facility])    [facility] [place]  [address]  [place] [address]   [phone]            Jan 24, 2017  9:00 AM   Level 5 with SOUTH CHAIR 2   [facility] ([facility])    [facility] [place]  [address]  [place] [address]   [phone]            Jan 26, 2017  8:30 AM   Level 5 with NORTH CHAIR 3   [facility] ([facility])    [facility] [place] [id number]  [place] [address]   [phone]            Jan 26, 2017  3:00 PM   RETURN ONC with Rafita Rogel MD   [facility] Blood and Marrow Transplant ([facility])    [address] [place] [id number] [phone]            Jan 29, 2017  9:00 AM   Level 5 with SOUTH CHAIR 2   [facility] ([facility])    [facility]  [address]  [place] [address]   [phone]            Jan 31, 2017  9:00 AM   Level 5 with SOUTH CHAIR 1   [facility] ([facility])    [facility]  [address]  [place] [address]   [phone]              Future tests that were ordered for you today     Open Standing Orders        Priority Remaining Interval Expires Ordered    Platelets prepare order unit Routine 99/100 CONDITIONAL (SPECIFY) BLOOD  1/17/2017    Transfuse platelets unit Routine 99/100 TRANSFUSE 1 DOSE  1/17/2017        "   Open Future Orders        Priority Expected Expires Ordered    US Lower Extremity Venous Duplex RT Routine  2018            Who to contact     If you have questions or need follow up information about today's clinic visit or your schedule please contact [facility] CANCER [facility] directly at [phone].  Normal or non-critical lab and imaging results will be communicated to you by MyChart, letter or phone within 4 business days after the clinic has received the results. If you do not hear from us within 7 days, please contact the clinic through MyChart or phone. If you have a critical or abnormal lab result, we will notify you by phone as soon as possible.  Submit refill requests through [employer] or call your pharmacy and they will forward the refill request to us. Please allow 3 business days for your refill to be completed.          Additional Information About Your Visit        MyChart Information     [employer] lets you send messages to your doctor, view your test results, renew your prescriptions, schedule appointments and more. To sign up, go to [email or web address] . Click on \"Log in\" on the left side of the screen, which will take you to the Welcome page. Then click on \"Sign up Now\" on the right side of the page.     You will be asked to enter the access code listed below, as well as some personal information. Please follow the directions to create your username and password.     Your access code is: [email or web address]  Expires: 3/15/2017 11:47 AM     Your access code will  in 90 days. If you need help or a new code, please call your [facility] clinic or [phone].        Care EveryWhere ID     This is your Care EveryWhere ID. This could be used by other organizations to access your [facility] medical records  [id number]         Blood Pressure from Last 3 Encounters:   17 109/63   01/15/17 121/80   17 145/83    Weight from Last 3 Encounters:   17 61.598 kg (135 lb " 12.8 oz)   01/13/17 64.638 kg (142 lb 8 oz)   01/04/17 62.506 kg (137 lb 12.8 oz)              We Performed the Following     Blood component     Platelets prepare order unit     Transfuse platelets unit          Today's Medication Changes          These changes are accurate as of: 1/17/17  3:27 PM.  If you have any questions, ask your nurse or doctor.               Start taking these medicines.        Dose/Directions    lidocaine 5 % Patch   Commonly known as:  LIDODERM   Used for:  Left-sided low back pain without sciatica, unspecified chronicity   Started by:  Celine Walls PA-C        Apply up to 3 patches to painful area at once for up to 12 h within a 24 h period.  Remove after 12 hours.   Quantity:  30 patch   Refills:  0       oxyCODONE 5 MG IR tablet   Commonly known as:  ROXICODONE   Used for:  Left-sided low back pain without sciatica, unspecified chronicity   Started by:  Celine Walls PA-C        Dose:  5 mg   Take 1 tablet (5 mg) by mouth every 6 hours as needed for moderate to severe pain   Quantity:  50 tablet   Refills:  0       senna-docusate 8.6-50 MG per tablet   Commonly known as:  SENNA S   Used for:  Left-sided low back pain without sciatica, unspecified chronicity, Constipation, unspecified constipation type   Started by:  Celine Walls PA-C        Dose:  1-2 tablet   Take 1-2 tablets by mouth 2 times daily as needed for constipation   Quantity:  100 tablet   Refills:  1            Where to get your medicines      These medications were sent to [address] [place] [address] [address] [place] [address]    Hours:  TRANSPLANT PHONE NUMBER [phone] Phone:  [phone]    - senna-docusate 8.6-50 MG per tablet      Some of these will need a paper prescription and others can be bought over the counter.  Ask your nurse if you have questions.     Bring a paper prescription for each of these medications     - oxyCODONE 5 MG IR tablet      Call your pharmacy to confirm that your medication is ready for pickup. It may take up to 24 hours for them to receive the prescription. If the prescription is not ready within 3 business days, please contact your clinic or your provider.     We will let you know when these medications are ready. If you don't hear back within 3 business days, please contact us.    - lidocaine 5 % Patch             Primary Care Provider Office Phone # Fax #    Shivani Phelps PA-C [phone]       [facility] [address] [facility] [address]  [facility] [address]        Thank you!     Thank you for choosing [facility]  for your care. Our goal is always to provide you with excellent care. Hearing back from our patients is one way we can continue to improve our services. Please take a few minutes to complete the written survey that you may receive in the mail after your visit with us. Thank you!             Your Updated Medication List - Protect others around you: Learn how to safely use, store and throw away your medicines at www.disposemymeds.org.          This list is accurate as of: 1/17/17  3:27 PM.  Always use your most recent med list.                   Brand Name Dispense Instructions for use    amLODIPine 5 MG tablet    NORVASC    30 tablet    Take 1 tablet (5 mg) by mouth daily       BENADRYL ALLERGY PO      Take 25 mg by mouth At Bedtime       calcium-vitamin D 600-400 MG-UNIT per tablet    CALTRATE     Take 1 tablet by mouth daily.       chlorpheniramine 4 MG tablet    CHLOR-TRIMETON     Take 4 mg by mouth every 6 hours as needed. For head cold       COMPRESSION STOCKINGS     2 each    Wear compression stockings at 20-30 mmHg rating most time during the day to the affected leg (left leg) or both legs. Take them off at night.       * cycloSPORINE modified capsule     120 capsule    Take 2 capsules (200 mg) by mouth 2 times daily Along with two 25 mg capsules        * cycloSPORINE modified 25 MG capsule    GENERIC EQUIVALENT    120 capsule    Take 2 capsules (50 mg) by mouth 2 times daily Along with two 100 mg capsules       * cycloSPORINE modified 100 MG capsule    GENERIC EQUIVALENT         diclofenac 1 % Gel topical gel    VOLTAREN    100 g    Apply 2 g topically 2 times daily       eltrombopag 50 MG tablet    PROMACTA    30 tablet    Take 1 tablet (50 mg) by mouth daily Administer on an empty stomach, 1 hour before or 2 hours after a meal.       fluconazole 200 MG tablet    DIFLUCAN    30 tablet    Take 1 tablet (200 mg) by mouth daily       levofloxacin 500 MG tablet    LEVAQUIN    30 tablet    Take 1 tablet (500 mg) by mouth daily       lidocaine 5 % Patch    LIDODERM    30 patch    Apply up to 3 patches to painful area at once for up to 12 h within a 24 h period.  Remove after 12 hours.       loratadine 10 MG tablet    CLARITIN     Take 10 mg by mouth daily as needed. For head cold       MULTIVITAMIN TABS   OR      1 tablet daily       order for DME     1 Box    Equipment being ordered: knee high compression stockings- 18-20 mm       oxyCODONE 5 MG IR tablet    ROXICODONE    50 tablet    Take 1 tablet (5 mg) by mouth every 6 hours as needed for moderate to severe pain       pantoprazole 40 MG EC tablet    PROTONIX    30 tablet    Take 1 tablet (40 mg) by mouth every morning       predniSONE 20 MG tablet    DELTASONE    42 tablet    Take 3 tablets (60 mg) by mouth daily       senna-docusate 8.6-50 MG per tablet    SENNA S    100 tablet    Take 1-2 tablets by mouth 2 times daily as needed for constipation       TYLENOL PO      Take 325 mg by mouth every 6 hours as needed for mild pain or fever       * Notice:  This list has 3 medication(s) that are the same as other medications prescribed for you. Read the directions carefully, and ask your doctor or other care provider to review them with you.

## 2017-01-17 NOTE — Clinical Note
[facility] Home  Complex Care Plan  About Me  Patient Name:  Bonny Parikh    YOB: 1945  Age:   71 year old   [place] MRN: [id number] Telephone Information:     Home Phone [phone]   Mobile none       Address:    [address]  [facility] [address] Email address:  No e-mail address on record      Emergency Contact(s)  Name Relationship Lgl Grd Work Phone Home Phone Mobile Phone   1. LAZARO PERKINS* Brother  none [phone] none   2. ROSEANN PARIKH Son  none [phone]           Primary language:  English     needed? No   [employer]:  [phone] op. 1  Other communication barriers: No  Preferred Method of Communication:  Phone  Current living arrangement: I live alone, I live in a private home  Mobility Status/ Medical Equipment: Independent  Other information to know about me:    Health Maintenance  Health Maintenance Reviewed: Due/Overdue:  Our records indicate you are due for the below listed health maintenance items.  Please let us know if you have completed them if not you can call [phone] to schedule a visit to complete them.  My Access Plan  Medical Emergency 911   Primary Clinic Line [facility]- [phone]   24 Hour Appointment Line [phone] or  [phone]) (toll-free)   24 Hour Nurse Line [phone] (toll-free)   Preferred Urgent Care [facility], [phone]   Preferred Hospital [facility]  [phone]   Preferred Pharmacy [facility] [address]     Behavioral Health Crisis Line [facility], [phone] or 911     My Care Team Members  Patient Care Team       Relationship Specialty Notifications Start End    Shivani Phelps PA-C PCP - General Internal Medicine  2/4/14     Phone: [phone] Fax: [phone]         [facility]  [address] [facility] [address] [facility] [address]    Aly Elliott MD MD Family Practice  9/30/15     Phone: [phone] Fax: [phone]          PHYSICIANS [address] [facility] [address] [facility] [address]    Rafita Rogel MD MD Internal Medicine Admissions 1/6/17     Phone: [phone] Fax: [phone]          PHYSICIANS [address] [facility] [address] [facility] [address]    Светлана Nolasco, RN Nurse Coordinator Hematology & Oncology Admissions 1/6/17     Phone: [phone]         Kathy Mcqueen RN Case Manager   1/17/17     Comment:  Through Shivani Phelps's office    Phone: [phone] Fax: [phone]             My Care Plans  Self Management and Treatment Plan  Goals and (Comments)  Goal #1: I will contact [employer] and review [employer] mobility application by 1/31/2017      50% of goal reached    Action Plans on File: None  Advance Care Plans/Directives Type:   Type Advanced Care Plans/Directives: Other    My Medical and Care Information  Problem List   Patient Active Problem List   Diagnosis     DIARRHEA     Esophageal reflux     Chest pain     Osteoporosis     CARDIOVASCULAR SCREENING; LDL GOAL LESS THAN 160     Seasonal allergic rhinitis     PE (pulmonary embolism)     Health Care Home     Sensorineural hearing loss of both ears     BPPV (benign paroxysmal positional vertigo)     Anemia     DVT, recurrent, lower extremity, acute (H)     Pulmonary emboli (H)     Advanced directives, counseling/discussion     Pancytopenia (H)     Aplastic anemia (H)      Current Medications and Allergies:  See printed Medication Report.    Care Coordination Start Date: 01/17/17   Frequency of Care Coordination: Every 2-3 weeks or as needed   Form Last Updated: 01/17/2017

## 2017-01-17 NOTE — PROGRESS NOTES
Chief Complaint   Patient presents with     Blood Draw     labs drawn from PICC line by RN     Patient did not hold her cyclosporine today.  Labs drawn from purple lumen of PICC line, flushed with NS.  Patient checked in for provider visit.  Shivani Ricketts RN

## 2017-01-18 ENCOUNTER — TELEPHONE (OUTPATIENT)
Dept: ONCOLOGY | Facility: CLINIC | Age: 72
End: 2017-01-18

## 2017-01-18 DIAGNOSIS — D61.818 PANCYTOPENIA (H): Primary | ICD-10-CM

## 2017-01-18 DIAGNOSIS — D61.9 APLASTIC ANEMIA (H): ICD-10-CM

## 2017-01-18 LAB
CMV DNA SPEC NAA+PROBE-ACNC: NORMAL [IU]/ML
CMV DNA SPEC NAA+PROBE-LOG#: NORMAL {LOG_IU}/ML
SPECIMEN SOURCE: NORMAL

## 2017-01-18 NOTE — TELEPHONE ENCOUNTER
Social work was asked by RNCC to contact patient regarding transportation resources for patient.  Social work spoke with patient over the phone.  Patient stated she had contacted [facility] but had been told they would not assist her in finding rides to [facility].  Patient stated she would be trying to get a ride from family and friends for her [facility] appointments or would take a cab.  Social work also reviewed [employer] and explained application process and provided phone number for [employer] Link[employer] Line.  Patient expressed understanding and stated she would also explore those options. No other needs indicated.     Soo Yeon Han, [facility]  [phone]

## 2017-01-18 NOTE — PROGRESS NOTES
"[facility] CANCER CLINIC  FOLLOW-UP VISIT NOTE  Date of visit: 1-17-17          REASON FOR VISIT: Aplastic anemia, hospital follow up    HPI: Bonny is a 71 year old female who presented in the fall of 2016 with fatigue and shortness of breath. 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  Her counts showed pancytopenia and BMBX was concerning for aplastic anemia.  By December of 2016 she was transfusion dependent with platelets under 10 k and ANC dropped under 500. 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  She has met with Dr Mcdaniel at [facility] and consulted with Dr Rogel at [facility].      After further consultation it was decided to admit on 1/9/17 for ATG/cyclosporine/prednisone therapy.     The following was her inpatient regimen:  Day 1= 1/9/17  ATG 2500 mg (40 mg/kg0 q24 hours D-4)  Cyclosporine 200 mg (3 mg/kg) PO bid  Eltrombopag 50 mg daily  Methylpred 31 mg (0.5 mg/kg) q24 hours D1-4  Prednisone 60 mg (1 mg/kg) daily after completion of IV methylpred to continue for at least 2 weeks    INTERVAL HISTORY: Bonny was d/c'd on 1/13/17 and comes in for follow up today.  She feels incredibly parched in the mouth.  She admits to drinking about 16 oz daily, which she thinks is a lot. Her appetite is low, she mostly eats frozen meals.  Is having someone come soon to help her clean out her fridge and get some more food at home.  She lives alone and drives herself to appointments.  She continues to have left sided lumbar back pain which has been plaguing her for weeks.  She took oxycodone in the hospital and feels it helped.  At home she is taking 1000 mg of tylenol every 6 hours with minimal relief.  At rest its a zero, but with position changes or twisting her torso, its an 8.  She also felt a \"bursa\" pop in her right knee and now her right leg is swollen.  She is \"completely not worried about it.\"  She feels really tired today and " "assumes she needs a transfusion.  No bleeding issues.      No fevers, chest pain.  Daily BM, no black/blood. No  issues.     EXAM:  /63 mmHg  Pulse 91  Temp(Src) 97.8  F (36.6  C)  Resp 16  Ht 1.6 m (5' 3\")  Wt 61.598 kg (135 lb 12.8 oz)  BMI 24.06 kg/m2  SpO2 99%  Wt Readings from Last 4 Encounters:   01/17/17 61.598 kg (135 lb 12.8 oz)   01/13/17 64.638 kg (142 lb 8 oz)   01/04/17 62.506 kg (137 lb 12.8 oz)   12/29/16 64.003 kg (141 lb 1.6 oz)     Vital signs were reviewed.   Patient alert and oriented x3.  In wheelchair, but easily gets onto exam table without assistance.  PERRLA. EOMI. No scleral icterus noted. OP without thrush/sores.  Neck exam: No palpable cervical, supraclavicular or axillary nodes bilaterally.   Heart: RRR no murmurs noted.   Lungs: clear to auscultation bilaterally.  No crackles or wheezing.   Abd: positive bowel sounds in all four quadrants.  No tenderness to palpation.  No hepatomegaly.   Extremities: Edema noted in the R leg only.  Neuro: grossly intact.   MSK: I cannot reproduce her LBP but seems in the paraspinal muscles on the left side of her lumbar spine  Mood and affect is stable.       LABS:      1/12/2017 05:43 1/13/2017 08:15 1/15/2017 08:30 1/17/2017 13:23   WBC 0.7 (LL) 1.0 (L) 1.1 (L) 2.2 (L)   Hemoglobin 7.1 (L) 7.7 (L) 7.9 (L) 8.4 (L)   Hematocrit 21.2 (L) 22.6 (L) 22.7 (L) 24.4 (L)   Platelet Count 10 (LL) 12 (LL) 32 (LL) 5 (LL)   RBC Count 2.34 (L) 2.55 (L) 2.56 (L) 2.70 (L)   MCV 91 89 89 90   MCH 30.3 30.2 30.9 31.1   MCHC 33.5 34.1 34.8 34.4   RDW 19.2 (H) 18.9 (H) 18.3 (H) 19.2 (H)   Diff Method Manual Differential Manual Differential Manual Differential Manual Differential   % Neutrophils 78.2 60.8 51.0 63.3   % Lymphocytes 17.5 35.1 39.0 28.4   % Monocytes 4.3 4.1 7.0 2.8   % Eosinophils 0.0 0.0 3.0 0.9   % Basophils 0.0 0.0 0.0 0.0   % Myelocytes    0.9   % Plasma Cells    3.7   Nucleated RBCs 9 (H) 11 (H) 2 (H) 6 (H)   Absolute Neutrophil 0.5 (L) " 0.6 (L) 0.6 (L) 1.4 (L)      1/17/2017 12:46   Sodium 140   Potassium 3.8   Chloride 107   Carbon Dioxide 25   Urea Nitrogen 35 (H)   Creatinine 0.80   GFR Estimate 70   GFR Estimate If Black 85   Calcium 8.8   Anion Gap 9     ASSESSMENT/PLAN: 71 year old female with AA, currently pancytopenic 2/2 to disease and further worsening from recent therapy    HEME- Treatment for AA 1/9-1/13/17 with ATG, methylpred, cyclosporine and eltrombopag.   -cyclosporine 250 mg BID, reviewed meds today.  Level on 1/13 was therapeutic.  Was suppose to have drawn today but didn't hold her AM dose.  Will repeat lab on 1/19  -continue 60 mg daily of pred through 1/27 (two weeks) and then will discuss taper  -eltrombopag to be delivered today, will start tonight  -Will need transfusion support 3 x week, worked on schedule today mostly at [facility] (thurs, Sunday, Tuesday each week)  -Dr Rogel on 1/26, Sejal on 2/10  -platelets today for count of 5k    CV-two prior provoked DVT in 2012 and 2014 with travel. 2012 was associated with PE. Was on warfarin/lovenox in the past.  Transitioned to ASA in the setting of low platelets  -swollen right leg today which started over the weekend, will obtain a US  -hypertension 2/2 to cyclosporin.  Not needing lasix or hydralazine outpatient.  Maintained BP on Norvasc 5 mg daily, good pressure today    ID- ANC improved today at 1400. No fever or symptoms of infection.  -continue flucon 200 mg daily + levaquin 500 mg daily  -CMV pending today    MSK- low back pain for last month.  Taking too much Tylenol and asked her to stop to not mask a neutropenic fever.  Will try some lidoderm patch and oxycodone prn.     FEN: weight stable.  Fluid intake sub optimal, suggested over a liter of fluid intake daily.  Mild increase in BUN, otherwise stable creat/lytes    Sejal Walls PA-C

## 2017-01-19 ENCOUNTER — HOSPITAL ENCOUNTER (OUTPATIENT)
Facility: CLINIC | Age: 72
Setting detail: SPECIMEN
Discharge: HOME | End: 2017-01-19
Attending: INTERNAL MEDICINE | Admitting: INTERNAL MEDICINE
Payer: COMMERCIAL

## 2017-01-19 ENCOUNTER — INFUSION THERAPY VISIT (OUTPATIENT)
Dept: INFUSION THERAPY | Facility: CLINIC | Age: 72
End: 2017-01-19
Attending: INTERNAL MEDICINE
Payer: COMMERCIAL

## 2017-01-19 ENCOUNTER — HOSPITAL ENCOUNTER (OUTPATIENT)
Dept: ULTRASOUND IMAGING | Facility: CLINIC | Age: 72
Discharge: HOME | End: 2017-01-19
Attending: PHYSICIAN ASSISTANT | Admitting: PHYSICIAN ASSISTANT
Payer: COMMERCIAL

## 2017-01-19 VITALS
HEART RATE: 80 BPM | RESPIRATION RATE: 18 BRPM | SYSTOLIC BLOOD PRESSURE: 132 MMHG | DIASTOLIC BLOOD PRESSURE: 80 MMHG | TEMPERATURE: 97.9 F

## 2017-01-19 DIAGNOSIS — D61.818 PANCYTOPENIA (H): ICD-10-CM

## 2017-01-19 DIAGNOSIS — M79.89 SWELLING OF RIGHT LOWER EXTREMITY: ICD-10-CM

## 2017-01-19 DIAGNOSIS — D61.9 APLASTIC ANEMIA (H): ICD-10-CM

## 2017-01-19 LAB
ALBUMIN UR-MCNC: NEGATIVE MG/DL
APPEARANCE UR: CLEAR
BASOPHILS # BLD AUTO: 0 10E9/L (ref 0–0.2)
BASOPHILS NFR BLD AUTO: 0 %
BILIRUB UR QL STRIP: ABNORMAL
COLOR UR AUTO: ABNORMAL
DIFFERENTIAL METHOD BLD: ABNORMAL
EOSINOPHIL # BLD AUTO: 0 10E9/L (ref 0–0.7)
EOSINOPHIL NFR BLD AUTO: 0 %
ERYTHROCYTE [DISTWIDTH] IN BLOOD BY AUTOMATED COUNT: 19.5 % (ref 10–15)
GLUCOSE UR STRIP-MCNC: NEGATIVE MG/DL
HCT VFR BLD AUTO: 24.8 % (ref 35–47)
HGB BLD-MCNC: 8.1 G/DL (ref 11.7–15.7)
HGB UR QL STRIP: ABNORMAL
IMM PLASMA CELLS NFR BLD: 4 %
KETONES UR STRIP-MCNC: NEGATIVE MG/DL
LEUKOCYTE ESTERASE UR QL STRIP: NEGATIVE
LYMPHOCYTES # BLD AUTO: 1.9 10E9/L (ref 0.8–5.3)
LYMPHOCYTES NFR BLD AUTO: 71 %
MCH RBC QN AUTO: 30.3 PG (ref 26.5–33)
MCHC RBC AUTO-ENTMCNC: 32.7 G/DL (ref 31.5–36.5)
MCV RBC AUTO: 93 FL (ref 78–100)
MONOCYTES # BLD AUTO: 0.1 10E9/L (ref 0–1.3)
MONOCYTES NFR BLD AUTO: 4 %
NEUTROPHILS # BLD AUTO: 0.6 10E9/L (ref 1.6–8.3)
NEUTROPHILS NFR BLD AUTO: 21 %
NITRATE UR QL: NEGATIVE
NRBC # BLD AUTO: 0.1 10*3/UL
NRBC BLD AUTO-RTO: 3 /100
PH UR STRIP: 7.5 PH (ref 5–7)
PLASMA CELLS # BLD MANUAL: 0.1 10E9/L
PLATELET # BLD AUTO: 33 10E9/L (ref 150–450)
RBC # BLD AUTO: 2.67 10E12/L (ref 3.8–5.2)
RBC #/AREA URNS AUTO: 0 /HPF (ref 0–2)
SP GR UR STRIP: 1 (ref 1–1.03)
SQUAMOUS #/AREA URNS AUTO: 10 /HPF (ref 0–1)
URN SPEC COLLECT METH UR: ABNORMAL
UROBILINOGEN UR STRIP-MCNC: 8 MG/DL (ref 0–2)
WBC # BLD AUTO: 2.7 10E9/L (ref 4–11)
WBC #/AREA URNS AUTO: 4 /HPF (ref 0–2)

## 2017-01-19 PROCEDURE — 93971 EXTREMITY STUDY: CPT | Mod: RT

## 2017-01-19 PROCEDURE — 36592 COLLECT BLOOD FROM PICC: CPT

## 2017-01-19 ASSESSMENT — PAIN SCALES - GENERAL: PAINLEVEL: MILD PAIN (2)

## 2017-01-19 NOTE — TELEPHONE ENCOUNTER
MTM referral from: Transitions of Care (recent hospital discharge or ED visit)    MTM referral outreach attempt #2 on January 19, 2017 at 9:59 AM      Outcome: Patient not reachable after several attempts, will route to MTM Pharmacist/Provider as an FYI. Thank you for the referral.    Teresita Pardo MTM Coordinator

## 2017-01-19 NOTE — PROGRESS NOTES
Infusion Nursing Note:  Bonny Raymundo presents today for possible trasnfusion.    Patient seen by provider today: No   present during visit today: Not Applicable.    Note: pt took medication which alters cyclosporin lab results by accident. Order cancelled.    Intravenous Access:  PICC.    Treatment Conditions:  HGB      8.1   1/19/2017  WBC      2.7   1/19/2017   ANEU      0.6   1/19/2017  PLT       33   1/19/2017     Results reviewed, labs did NOT meet treatment parameters: Hgb 8.1, plt 33.      Post Infusion Assessment:  Site patent and intact, free from redness, edema or discomfort.  No evidence of extravasations.    Discharge Plan:   Patient and/or family verbalized understanding of discharge instructions and all questions answered.  Copy of AVS reviewed with patient and/or family.  Patient will return for next appointment.  Patient discharged in stable condition accompanied by: self.  Departure Mode: Ambulatory.    Laisha Jeffery RN

## 2017-01-20 ENCOUNTER — HOSPITAL ENCOUNTER (OUTPATIENT)
Facility: CLINIC | Age: 72
Setting detail: SPECIMEN
Discharge: HOME | End: 2017-01-20
Attending: PHYSICIAN ASSISTANT | Admitting: INTERNAL MEDICINE
Payer: COMMERCIAL

## 2017-01-20 ENCOUNTER — INFUSION THERAPY VISIT (OUTPATIENT)
Dept: INFUSION THERAPY | Facility: CLINIC | Age: 72
DRG: 393 | End: 2017-01-20
Attending: PHYSICIAN ASSISTANT
Payer: COMMERCIAL

## 2017-01-20 DIAGNOSIS — D61.818 PANCYTOPENIA (H): ICD-10-CM

## 2017-01-20 DIAGNOSIS — D61.9 APLASTIC ANEMIA (H): ICD-10-CM

## 2017-01-20 LAB
CYCLOSPORINE BLD LC/MS/MS-MCNC: 384 UG/L (ref 50–400)
ERYTHROCYTE [DISTWIDTH] IN BLOOD BY AUTOMATED COUNT: 18.6 % (ref 10–15)
HCT VFR BLD AUTO: 22.4 % (ref 35–47)
HGB BLD-MCNC: 7.9 G/DL (ref 11.7–15.7)
MCH RBC QN AUTO: 31.2 PG (ref 26.5–33)
MCHC RBC AUTO-ENTMCNC: 35.3 G/DL (ref 31.5–36.5)
MCV RBC AUTO: 89 FL (ref 78–100)
PLATELET # BLD AUTO: 19 10E9/L (ref 150–450)
RBC # BLD AUTO: 2.53 10E12/L (ref 3.8–5.2)
TME LAST DOSE: NORMAL H
WBC # BLD AUTO: 3.5 10E9/L (ref 4–11)

## 2017-01-20 PROCEDURE — 85027 COMPLETE CBC AUTOMATED: CPT | Performed by: PHYSICIAN ASSISTANT

## 2017-01-20 PROCEDURE — 80158 DRUG ASSAY CYCLOSPORINE: CPT | Performed by: INTERNAL MEDICINE

## 2017-01-20 NOTE — PROGRESS NOTES
Infusion Nursing Note:  Bonny Raymundo presents today for picc labs.    Patient seen by provider today: No   present during visit today: Not Applicable.    Note: Cyclosporine level drawn per ordres..    Intravenous Access:  PICC.    Treatment Conditions:  Not Applicable.      Post Infusion Assessment:  Blood return noted pre and post infusion.  Site patent and intact, free from redness, edema or discomfort.  No evidence of extravasations.    Discharge Plan:   Patient discharged in stable condition accompanied by: self and friend.  Departure Mode: Ambulatory.    Guerline Robles RN

## 2017-01-22 ENCOUNTER — INFUSION THERAPY VISIT (OUTPATIENT)
Dept: INFUSION THERAPY | Facility: CLINIC | Age: 72
DRG: 393 | End: 2017-01-22
Attending: PHYSICIAN ASSISTANT
Payer: COMMERCIAL

## 2017-01-22 ENCOUNTER — HOSPITAL ENCOUNTER (OUTPATIENT)
Facility: CLINIC | Age: 72
Setting detail: SPECIMEN
Discharge: HOME | End: 2017-01-22
Attending: INTERNAL MEDICINE | Admitting: INTERNAL MEDICINE
Payer: COMMERCIAL

## 2017-01-22 VITALS
OXYGEN SATURATION: 100 % | DIASTOLIC BLOOD PRESSURE: 72 MMHG | RESPIRATION RATE: 20 BRPM | HEART RATE: 82 BPM | TEMPERATURE: 97.4 F | SYSTOLIC BLOOD PRESSURE: 119 MMHG

## 2017-01-22 DIAGNOSIS — D61.818 PANCYTOPENIA (H): Primary | ICD-10-CM

## 2017-01-22 LAB
ABO + RH BLD: NORMAL
ABO + RH BLD: NORMAL
BASOPHILS # BLD AUTO: 0 10E9/L (ref 0–0.2)
BASOPHILS NFR BLD AUTO: 0 %
BLD GP AB SCN SERPL QL: NORMAL
BLD PROD TYP BPU: NORMAL
BLD PROD TYP BPU: NORMAL
BLD UNIT ID BPU: 0
BLOOD BANK CMNT PATIENT-IMP: NORMAL
BLOOD PRODUCT CODE: NORMAL
BPU ID: NORMAL
DIFFERENTIAL METHOD BLD: ABNORMAL
EOSINOPHIL # BLD AUTO: 0 10E9/L (ref 0–0.7)
EOSINOPHIL NFR BLD AUTO: 0.3 %
ERYTHROCYTE [DISTWIDTH] IN BLOOD BY AUTOMATED COUNT: 18.5 % (ref 10–15)
HCT VFR BLD AUTO: 22.6 % (ref 35–47)
HGB BLD-MCNC: 7.8 G/DL (ref 11.7–15.7)
IMM GRANULOCYTES # BLD: 0 10E9/L (ref 0–0.4)
IMM GRANULOCYTES NFR BLD: 0 %
LYMPHOCYTES # BLD AUTO: 2.6 10E9/L (ref 0.8–5.3)
LYMPHOCYTES NFR BLD AUTO: 80.9 %
MCH RBC QN AUTO: 30.8 PG (ref 26.5–33)
MCHC RBC AUTO-ENTMCNC: 34.5 G/DL (ref 31.5–36.5)
MCV RBC AUTO: 89 FL (ref 78–100)
MONOCYTES # BLD AUTO: 0.1 10E9/L (ref 0–1.3)
MONOCYTES NFR BLD AUTO: 2.8 %
NEUTROPHILS # BLD AUTO: 0.5 10E9/L (ref 1.6–8.3)
NEUTROPHILS NFR BLD AUTO: 16 %
NRBC # BLD AUTO: 0 10*3/UL
NRBC BLD AUTO-RTO: 1 /100
NUM BPU REQUESTED: 1
PLATELET # BLD AUTO: 10 10E9/L (ref 150–450)
RBC # BLD AUTO: 2.53 10E12/L (ref 3.8–5.2)
SPECIMEN EXP DATE BLD: NORMAL
TRANSFUSION STATUS PATIENT QL: NORMAL
TRANSFUSION STATUS PATIENT QL: NORMAL
WBC # BLD AUTO: 3.2 10E9/L (ref 4–11)

## 2017-01-22 PROCEDURE — 86901 BLOOD TYPING SEROLOGIC RH(D): CPT | Performed by: INTERNAL MEDICINE

## 2017-01-22 PROCEDURE — 86923 COMPATIBILITY TEST ELECTRIC: CPT | Performed by: INTERNAL MEDICINE

## 2017-01-22 PROCEDURE — 85025 COMPLETE CBC W/AUTO DIFF WBC: CPT | Performed by: INTERNAL MEDICINE

## 2017-01-22 PROCEDURE — P9040 RBC LEUKOREDUCED IRRADIATED: HCPCS | Performed by: INTERNAL MEDICINE

## 2017-01-22 PROCEDURE — 86850 RBC ANTIBODY SCREEN: CPT | Performed by: INTERNAL MEDICINE

## 2017-01-22 PROCEDURE — 86900 BLOOD TYPING SEROLOGIC ABO: CPT | Performed by: INTERNAL MEDICINE

## 2017-01-22 ASSESSMENT — PAIN SCALES - GENERAL: PAINLEVEL: MODERATE PAIN (4)

## 2017-01-22 NOTE — MR AVS SNAPSHOT
After Visit Summary   1/22/2017    Bonny Raymundo    MRN: [id number]           Patient Information     Date Of Birth          1945        Visit Information        Provider Department      1/22/2017 9:00 AM SOUTH CHAIR 2 [facility]        Today's Diagnoses     Pancytopenia (H)    -  1        Follow-ups after your visit        Your next 10 appointments already scheduled     Jan 24, 2017  9:00 AM   Level 5 with SOUTH CHAIR 2   [facility] ([facility])    [facility]  [address]  [place] [address]   [phone]            Jan 26, 2017  8:30 AM   Level 5 with NORTH CHAIR 3   [facility] ([facility])    [facility] [place]  [address]  [place] [address]   [phone]            Jan 26, 2017  3:00 PM   RETURN ONC with Rafita Rogel MD   [facility] ([facility])    [address] [place] [address]   [phone]            Jan 29, 2017  9:00 AM   Level 5 with SOUTH CHAIR 2   [facility] ([facility])    [facility]  [address]  [place] [address]   [phone]            Jan 31, 2017  9:00 AM   Level 5 with SOUTH CHAIR 1   [facility] ([facility])    [facility] [place]  [address]  [place] [address]   [phone]            Feb 02, 2017  9:30 AM   Level 5 with NORTH CHAIR 3   [facility] ([facility])    [facility]  [address]  [place] [address]   [phone]            Feb 05, 2017  9:00 AM   Level 5 with SOUTH CHAIR 4   [facility] ([facility])    [facility] [place]  [address]  [place] [address]   [phone]            Feb 07, 2017  9:00 AM   Level 5 with SOUTH CHAIR 2   [facility] ([facility])    [facility] [place]  [address]  [place] [address]   [phone]            Feb 10, 2017  9:30 AM   (Arrive by 9:15 AM)   Return Visit with Celine Walls PA-C   [facility] ([facility])    [address]  2nd Floor  [facility] [id number] [phone]            Feb 10, 2017 10:30 AM   Infusion 360 with [facility] INFUSION   [facility] ([facility])    [address]  [address]nd [address]  [facility] [id number] [phone]              Future tests that were ordered for you today     Open Standing Orders        Priority Remaining Interval Expires Ordered    Red blood cell prepare order unit Routine 99/100 CONDITIONAL (SPECIFY) BLOOD  1/22/2017    Transfuse red blood cell unit Routine 99/100 TRANSFUSE 1 UNIT  1/22/2017            Who to contact     If you have questions or need follow up information about today's clinic visit or your schedule please contact [facility] directly at [phone].  Normal or non-critical lab and imaging results will be communicated to you by MyChart, letter or phone within 4 business days after the clinic has received the results. If you do not hear from us within 7 days, please contact the clinic through [employer] or phone. If you have a critical or abnormal lab result, we will notify you by phone as soon as possible.  Submit refill requests through [employer] or call your pharmacy and they will forward the refill request to us. Please allow 3 business days for your refill to be completed.          Additional Information About Your Visit        MyChart Information      "eflow lets you send messages to your doctor, view your test results, renew your prescriptions, schedule appointments and more. To sign up, go to [email or web address] . Click on \"Log in\" on the left side of the screen, which will take you to the Welcome page. Then click on \"Sign up Now\" on the right side of the page.     You will be asked to enter the access code listed below, as well as some personal information. Please follow the directions to create your username and password.     Your access code is: [email or web address]  Expires: 3/15/2017 11:47 AM     Your access code will  in 90 days. If you need help or a new code, please call your [facility] clinic or [phone].        Care EveryWhere ID     This is your Care EveryWhere ID. This could be used by other organizations to access your [facility] medical records  [id number]        Your Vitals Were     Pulse Temperature Respirations Pulse Oximetry          82 97.4  F (36.3  C) (Oral) 20 100%         Blood Pressure from Last 3 Encounters:   17 119/72   17 132/80   17 135/67    Weight from Last 3 Encounters:   17 61.598 kg (135 lb 12.8 oz)   17 64.638 kg (142 lb 8 oz)   17 62.506 kg (137 lb 12.8 oz)              We Performed the Following     ABO/Rh type and screen     Blood component     CBC with platelets differential     Obtain affirmation of informed consent     Red blood cell prepare order unit     Transfuse red blood cell unit     Treatment Conditions        Primary Care Provider Office Phone # Fax #    Shivani Phelps PA-C [phone]       [facility] [address]  [facility] [address]        Thank you!     Thank you for choosing [facility]  for your care. Our goal is always to provide you with excellent care. Hearing back from our patients is one way we can continue to improve our services. Please take a few minutes to complete the written survey that " you may receive in the mail after your visit with us. Thank you!             Your Updated Medication List - Protect others around you: Learn how to safely use, store and throw away your medicines at www.disposemymeds.org.          This list is accurate as of: 1/22/17  1:29 PM.  Always use your most recent med list.                   Brand Name Dispense Instructions for use    amLODIPine 5 MG tablet    NORVASC    30 tablet    Take 1 tablet (5 mg) by mouth daily       BENADRYL ALLERGY PO      Take 25 mg by mouth At Bedtime       calcium-vitamin D 600-400 MG-UNIT per tablet    CALTRATE     Take 1 tablet by mouth daily.       chlorpheniramine 4 MG tablet    CHLOR-TRIMETON     Take 4 mg by mouth every 6 hours as needed. For head cold       COMPRESSION STOCKINGS     2 each    Wear compression stockings at 20-30 mmHg rating most time during the day to the affected leg (left leg) or both legs. Take them off at night.       * cycloSPORINE modified capsule     120 capsule    Take 2 capsules (200 mg) by mouth 2 times daily Along with two 25 mg capsules       * cycloSPORINE modified 25 MG capsule    GENERIC EQUIVALENT    120 capsule    Take 2 capsules (50 mg) by mouth 2 times daily Along with two 100 mg capsules       * cycloSPORINE modified 100 MG capsule    GENERIC EQUIVALENT         diclofenac 1 % Gel topical gel    VOLTAREN    100 g    Apply 2 g topically 2 times daily       eltrombopag 50 MG tablet    PROMACTA    30 tablet    Take 1 tablet (50 mg) by mouth daily Administer on an empty stomach, 1 hour before or 2 hours after a meal.       fluconazole 200 MG tablet    DIFLUCAN    30 tablet    Take 1 tablet (200 mg) by mouth daily       levofloxacin 500 MG tablet    LEVAQUIN    30 tablet    Take 1 tablet (500 mg) by mouth daily       lidocaine 5 % Patch    LIDODERM    30 patch    Apply up to 3 patches to painful area at once for up to 12 h within a 24 h period.  Remove after 12 hours.       loratadine 10 MG tablet    CLARITIN      Take 10 mg by mouth daily as needed. For head cold       MULTIVITAMIN TABS   OR      1 tablet daily       order for DME     1 Box    Equipment being ordered: knee high compression stockings- 18-20 mm       oxyCODONE 5 MG IR tablet    ROXICODONE    50 tablet    Take 1 tablet (5 mg) by mouth every 6 hours as needed for moderate to severe pain       pantoprazole 40 MG EC tablet    PROTONIX    30 tablet    Take 1 tablet (40 mg) by mouth every morning       predniSONE 20 MG tablet    DELTASONE    42 tablet    Take 3 tablets (60 mg) by mouth daily       senna-docusate 8.6-50 MG per tablet    SENNA S    100 tablet    Take 1-2 tablets by mouth 2 times daily as needed for constipation       TYLENOL PO      Take 325 mg by mouth every 6 hours as needed for mild pain or fever       * Notice:  This list has 3 medication(s) that are the same as other medications prescribed for you. Read the directions carefully, and ask your doctor or other care provider to review them with you.

## 2017-01-22 NOTE — PROGRESS NOTES
"Infusion Nursing Note:  Bonny Raymundo presents today for labs, transfusion.    Patient seen by provider today: No   present during visit today: Not Applicable.    Note: pt reports \"feeling weak like a kitten and shakey\". Patient instructed to call the clinic tomorrow if still feeling like she was today. Patient verbalized understanding to the above.     Intravenous Access:  Labs drawn without difficulty.  PICC.    Treatment Conditions:  HGB      7.8   1/22/2017  WBC      3.2   1/22/2017   ANEU      0.5   1/22/2017  PLT       10   1/22/2017     Results reviewed, labs MET treatment parameters, ok to proceed with treatment.  Blood transfusion consent signed 10/6/16.    Post Infusion Assessment:  Patient tolerated infusion without incident.  Site patent and intact, free from redness, edema or discomfort.  No evidence of extravasations.  Access discontinued per protocol.    Discharge Plan:   Discharge instructions reviewed with: Patient.  Patient and/or family verbalized understanding of discharge instructions and all questions answered.  Copy of AVS reviewed with patient and/or family.  Patient will return 1/24/17 for next appointment.  Patient discharged in stable condition accompanied by: self.  Departure Mode: Wheelchair.    ANABELLE Bustamante RN                          "

## 2017-01-23 ENCOUNTER — APPOINTMENT (OUTPATIENT)
Dept: GENERAL RADIOLOGY | Facility: CLINIC | Age: 72
DRG: 393 | End: 2017-01-23
Attending: EMERGENCY MEDICINE
Payer: COMMERCIAL

## 2017-01-23 ENCOUNTER — HOSPITAL ENCOUNTER (INPATIENT)
Facility: CLINIC | Age: 72
LOS: 2 days | Discharge: HOME HEALTH | DRG: 393 | End: 2017-01-25
Attending: FAMILY MEDICINE | Admitting: INTERNAL MEDICINE
Payer: COMMERCIAL

## 2017-01-23 ENCOUNTER — APPOINTMENT (OUTPATIENT)
Dept: GENERAL RADIOLOGY | Facility: CLINIC | Age: 72
DRG: 393 | End: 2017-01-23
Attending: FAMILY MEDICINE
Payer: COMMERCIAL

## 2017-01-23 DIAGNOSIS — K59.00 CONSTIPATION, UNSPECIFIED CONSTIPATION TYPE: ICD-10-CM

## 2017-01-23 DIAGNOSIS — D69.6 THROMBOCYTOPENIA (H): ICD-10-CM

## 2017-01-23 DIAGNOSIS — D61.818 PANCYTOPENIA (H): ICD-10-CM

## 2017-01-23 DIAGNOSIS — K62.5 BRBPR (BRIGHT RED BLOOD PER RECTUM): ICD-10-CM

## 2017-01-23 DIAGNOSIS — K64.9 HEMORRHOIDS, UNSPECIFIED HEMORRHOID TYPE: Primary | ICD-10-CM

## 2017-01-23 DIAGNOSIS — D61.3 IDIOPATHIC APLASTIC ANEMIA (H): ICD-10-CM

## 2017-01-23 DIAGNOSIS — R53.81 PHYSICAL DECONDITIONING: ICD-10-CM

## 2017-01-23 DIAGNOSIS — M54.50 LEFT-SIDED LOW BACK PAIN WITHOUT SCIATICA, UNSPECIFIED CHRONICITY: ICD-10-CM

## 2017-01-23 LAB
ABO + RH BLD: NORMAL
ABO + RH BLD: NORMAL
ALBUMIN SERPL-MCNC: 2.6 G/DL (ref 3.4–5)
ALP SERPL-CCNC: 104 U/L (ref 40–150)
ALT SERPL W P-5'-P-CCNC: 74 U/L (ref 0–50)
ANION GAP SERPL CALCULATED.3IONS-SCNC: 9 MMOL/L (ref 3–14)
APTT PPP: 23 SEC (ref 22–37)
AST SERPL W P-5'-P-CCNC: 20 U/L (ref 0–45)
BASOPHILS # BLD AUTO: 0 10E9/L (ref 0–0.2)
BASOPHILS NFR BLD AUTO: 0 %
BILIRUB DIRECT SERPL-MCNC: 1.3 MG/DL (ref 0–0.2)
BILIRUB SERPL-MCNC: 2 MG/DL (ref 0.2–1.3)
BLD GP AB SCN SERPL QL: NORMAL
BLD PROD TYP BPU: NORMAL
BLD UNIT ID BPU: 0
BLOOD BANK CMNT PATIENT-IMP: NORMAL
BLOOD PRODUCT CODE: NORMAL
BPU ID: NORMAL
BUN SERPL-MCNC: 33 MG/DL (ref 7–30)
CALCIUM SERPL-MCNC: 8.5 MG/DL (ref 8.5–10.1)
CHLORIDE SERPL-SCNC: 108 MMOL/L (ref 94–109)
CO2 SERPL-SCNC: 25 MMOL/L (ref 20–32)
CREAT SERPL-MCNC: 1.11 MG/DL (ref 0.52–1.04)
DIFFERENTIAL METHOD BLD: ABNORMAL
EOSINOPHIL # BLD AUTO: 0 10E9/L (ref 0–0.7)
EOSINOPHIL NFR BLD AUTO: 0.3 %
ERYTHROCYTE [DISTWIDTH] IN BLOOD BY AUTOMATED COUNT: 17.7 % (ref 10–15)
ERYTHROCYTE [DISTWIDTH] IN BLOOD BY AUTOMATED COUNT: 17.7 % (ref 10–15)
ERYTHROCYTE [DISTWIDTH] IN BLOOD BY AUTOMATED COUNT: 17.8 % (ref 10–15)
GFR SERPL CREATININE-BSD FRML MDRD: 48 ML/MIN/1.7M2
GLUCOSE SERPL-MCNC: 116 MG/DL (ref 70–99)
HCT VFR BLD AUTO: 23.9 % (ref 35–47)
HCT VFR BLD AUTO: 24.4 % (ref 35–47)
HCT VFR BLD AUTO: 26.3 % (ref 35–47)
HGB BLD-MCNC: 7.9 G/DL (ref 11.7–15.7)
HGB BLD-MCNC: 8 G/DL (ref 11.7–15.7)
HGB BLD-MCNC: 9.1 G/DL (ref 11.7–15.7)
IMM GRANULOCYTES # BLD: 0 10E9/L (ref 0–0.4)
IMM GRANULOCYTES NFR BLD: 0.3 %
INR PPP: 1.1 (ref 0.86–1.14)
LYMPHOCYTES # BLD AUTO: 2.3 10E9/L (ref 0.8–5.3)
LYMPHOCYTES NFR BLD AUTO: 77.9 %
MCH RBC QN AUTO: 29.6 PG (ref 26.5–33)
MCH RBC QN AUTO: 29.7 PG (ref 26.5–33)
MCH RBC QN AUTO: 30.5 PG (ref 26.5–33)
MCHC RBC AUTO-ENTMCNC: 32.8 G/DL (ref 31.5–36.5)
MCHC RBC AUTO-ENTMCNC: 33.1 G/DL (ref 31.5–36.5)
MCHC RBC AUTO-ENTMCNC: 34.6 G/DL (ref 31.5–36.5)
MCV RBC AUTO: 88 FL (ref 78–100)
MCV RBC AUTO: 90 FL (ref 78–100)
MCV RBC AUTO: 90 FL (ref 78–100)
MONOCYTES # BLD AUTO: 0.1 10E9/L (ref 0–1.3)
MONOCYTES NFR BLD AUTO: 3.1 %
NEUTROPHILS # BLD AUTO: 0.5 10E9/L (ref 1.6–8.3)
NEUTROPHILS NFR BLD AUTO: 18.4 %
NRBC # BLD AUTO: 0 10*3/UL
NRBC BLD AUTO-RTO: 1 /100
NUM BPU REQUESTED: 1
NUM BPU REQUESTED: 1
NUM BPU REQUESTED: 2
PLATELET # BLD AUTO: 56 10E9/L (ref 150–450)
PLATELET # BLD AUTO: 57 10E9/L (ref 150–450)
PLATELET # BLD AUTO: 7 10E9/L (ref 150–450)
POTASSIUM SERPL-SCNC: 4 MMOL/L (ref 3.4–5.3)
PROT SERPL-MCNC: 6.8 G/DL (ref 6.8–8.8)
RADIOLOGIST FLAGS: ABNORMAL
RADIOLOGIST FLAGS: ABNORMAL
RBC # BLD AUTO: 2.66 10E12/L (ref 3.8–5.2)
RBC # BLD AUTO: 2.7 10E12/L (ref 3.8–5.2)
RBC # BLD AUTO: 2.98 10E12/L (ref 3.8–5.2)
SODIUM SERPL-SCNC: 142 MMOL/L (ref 133–144)
SPECIMEN EXP DATE BLD: NORMAL
TRANSFUSION STATUS PATIENT QL: NORMAL
WBC # BLD AUTO: 1.3 10E9/L (ref 4–11)
WBC # BLD AUTO: 1.3 10E9/L (ref 4–11)
WBC # BLD AUTO: 2.9 10E9/L (ref 4–11)

## 2017-01-23 PROCEDURE — 85027 COMPLETE CBC AUTOMATED: CPT | Performed by: PHYSICIAN ASSISTANT

## 2017-01-23 PROCEDURE — 80076 HEPATIC FUNCTION PANEL: CPT | Performed by: EMERGENCY MEDICINE

## 2017-01-23 PROCEDURE — 86850 RBC ANTIBODY SCREEN: CPT | Performed by: PHYSICIAN ASSISTANT

## 2017-01-23 PROCEDURE — 71010 XR CHEST PORT 1 VW: CPT

## 2017-01-23 PROCEDURE — 71020 XR CHEST 2 VW: CPT

## 2017-01-23 PROCEDURE — 86900 BLOOD TYPING SEROLOGIC ABO: CPT | Performed by: FAMILY MEDICINE

## 2017-01-23 PROCEDURE — 96361 HYDRATE IV INFUSION ADD-ON: CPT | Performed by: FAMILY MEDICINE

## 2017-01-23 PROCEDURE — P9037 PLATE PHERES LEUKOREDU IRRAD: HCPCS | Performed by: FAMILY MEDICINE

## 2017-01-23 PROCEDURE — 86901 BLOOD TYPING SEROLOGIC RH(D): CPT | Performed by: FAMILY MEDICINE

## 2017-01-23 PROCEDURE — 25000128 H RX IP 250 OP 636: Performed by: FAMILY MEDICINE

## 2017-01-23 PROCEDURE — 93010 ELECTROCARDIOGRAM REPORT: CPT | Mod: Z6 | Performed by: FAMILY MEDICINE

## 2017-01-23 PROCEDURE — 12000008 ZZH R&B INTERMEDIATE UMMC

## 2017-01-23 PROCEDURE — 86850 RBC ANTIBODY SCREEN: CPT | Performed by: FAMILY MEDICINE

## 2017-01-23 PROCEDURE — 25000128 H RX IP 250 OP 636: Performed by: PHYSICIAN ASSISTANT

## 2017-01-23 PROCEDURE — P9040 RBC LEUKOREDUCED IRRADIATED: HCPCS | Performed by: FAMILY MEDICINE

## 2017-01-23 PROCEDURE — 25000132 ZZH RX MED GY IP 250 OP 250 PS 637: Performed by: INTERNAL MEDICINE

## 2017-01-23 PROCEDURE — 25000132 ZZH RX MED GY IP 250 OP 250 PS 637: Performed by: PHYSICIAN ASSISTANT

## 2017-01-23 PROCEDURE — 93005 ELECTROCARDIOGRAM TRACING: CPT | Performed by: FAMILY MEDICINE

## 2017-01-23 PROCEDURE — 36592 COLLECT BLOOD FROM PICC: CPT | Performed by: PHYSICIAN ASSISTANT

## 2017-01-23 PROCEDURE — 85025 COMPLETE CBC W/AUTO DIFF WBC: CPT | Performed by: EMERGENCY MEDICINE

## 2017-01-23 PROCEDURE — 36430 TRANSFUSION BLD/BLD COMPNT: CPT | Performed by: FAMILY MEDICINE

## 2017-01-23 PROCEDURE — 99285 EMERGENCY DEPT VISIT HI MDM: CPT | Mod: 25 | Performed by: FAMILY MEDICINE

## 2017-01-23 PROCEDURE — 25000131 ZZH RX MED GY IP 250 OP 636 PS 637: Performed by: INTERNAL MEDICINE

## 2017-01-23 PROCEDURE — 85730 THROMBOPLASTIN TIME PARTIAL: CPT | Performed by: EMERGENCY MEDICINE

## 2017-01-23 PROCEDURE — 86923 COMPATIBILITY TEST ELECTRIC: CPT | Performed by: FAMILY MEDICINE

## 2017-01-23 PROCEDURE — 40000940 XR CHEST PORT 1 VW

## 2017-01-23 PROCEDURE — 85610 PROTHROMBIN TIME: CPT | Performed by: EMERGENCY MEDICINE

## 2017-01-23 PROCEDURE — 86901 BLOOD TYPING SEROLOGIC RH(D): CPT | Performed by: PHYSICIAN ASSISTANT

## 2017-01-23 PROCEDURE — 96360 HYDRATION IV INFUSION INIT: CPT | Performed by: FAMILY MEDICINE

## 2017-01-23 PROCEDURE — 99212 OFFICE O/P EST SF 10 MIN: CPT | Mod: 25

## 2017-01-23 PROCEDURE — 86900 BLOOD TYPING SEROLOGIC ABO: CPT | Performed by: PHYSICIAN ASSISTANT

## 2017-01-23 PROCEDURE — 80048 BASIC METABOLIC PNL TOTAL CA: CPT | Performed by: EMERGENCY MEDICINE

## 2017-01-23 RX ORDER — LORATADINE 10 MG/1
10 TABLET ORAL DAILY PRN
Status: DISCONTINUED | OUTPATIENT
Start: 2017-01-23 | End: 2017-01-25 | Stop reason: HOSPADM

## 2017-01-23 RX ORDER — SODIUM CHLORIDE 9 MG/ML
INJECTION, SOLUTION INTRAVENOUS
Status: DISCONTINUED
Start: 2017-01-23 | End: 2017-01-23 | Stop reason: HOSPADM

## 2017-01-23 RX ORDER — LEVOFLOXACIN 500 MG/1
500 TABLET, FILM COATED ORAL EVERY EVENING
Status: DISCONTINUED | OUTPATIENT
Start: 2017-01-23 | End: 2017-01-25 | Stop reason: HOSPADM

## 2017-01-23 RX ORDER — ACETAMINOPHEN 500 MG
500 TABLET ORAL EVERY 6 HOURS PRN
Status: DISCONTINUED | OUTPATIENT
Start: 2017-01-23 | End: 2017-01-23

## 2017-01-23 RX ORDER — ACETAMINOPHEN 500 MG
500 TABLET ORAL EVERY 6 HOURS PRN
Status: DISCONTINUED | OUTPATIENT
Start: 2017-01-23 | End: 2017-01-25 | Stop reason: HOSPADM

## 2017-01-23 RX ORDER — ONDANSETRON 8 MG/1
8 TABLET, ORALLY DISINTEGRATING ORAL EVERY 8 HOURS PRN
Status: DISCONTINUED | OUTPATIENT
Start: 2017-01-23 | End: 2017-01-25 | Stop reason: HOSPADM

## 2017-01-23 RX ORDER — CYCLOSPORINE 25 MG/1
50 CAPSULE, LIQUID FILLED ORAL 2 TIMES DAILY
Status: DISCONTINUED | OUTPATIENT
Start: 2017-01-23 | End: 2017-01-23

## 2017-01-23 RX ORDER — AMOXICILLIN 250 MG
1-2 CAPSULE ORAL 2 TIMES DAILY PRN
Status: DISCONTINUED | OUTPATIENT
Start: 2017-01-23 | End: 2017-01-24

## 2017-01-23 RX ORDER — SODIUM CHLORIDE 9 MG/ML
INJECTION, SOLUTION INTRAVENOUS CONTINUOUS
Status: DISCONTINUED | OUTPATIENT
Start: 2017-01-23 | End: 2017-01-25

## 2017-01-23 RX ORDER — FLUCONAZOLE 200 MG/1
200 TABLET ORAL EVERY EVENING
Status: DISCONTINUED | OUTPATIENT
Start: 2017-01-23 | End: 2017-01-25 | Stop reason: HOSPADM

## 2017-01-23 RX ORDER — CYCLOSPORINE 100 MG/1
200 CAPSULE, LIQUID FILLED ORAL 2 TIMES DAILY
Status: DISCONTINUED | OUTPATIENT
Start: 2017-01-23 | End: 2017-01-23

## 2017-01-23 RX ORDER — DIPHENHYDRAMINE HCL 25 MG
25 CAPSULE ORAL
Status: DISCONTINUED | OUTPATIENT
Start: 2017-01-23 | End: 2017-01-25 | Stop reason: HOSPADM

## 2017-01-23 RX ORDER — PANTOPRAZOLE SODIUM 40 MG/1
40 TABLET, DELAYED RELEASE ORAL EVERY MORNING
Status: DISCONTINUED | OUTPATIENT
Start: 2017-01-24 | End: 2017-01-25 | Stop reason: HOSPADM

## 2017-01-23 RX ORDER — ZINC OXIDE 20 %
OINTMENT (GRAM) TOPICAL
Status: DISCONTINUED | OUTPATIENT
Start: 2017-01-23 | End: 2017-01-25 | Stop reason: HOSPADM

## 2017-01-23 RX ORDER — NALOXONE HYDROCHLORIDE 0.4 MG/ML
.1-.4 INJECTION, SOLUTION INTRAMUSCULAR; INTRAVENOUS; SUBCUTANEOUS
Status: DISCONTINUED | OUTPATIENT
Start: 2017-01-23 | End: 2017-01-25 | Stop reason: HOSPADM

## 2017-01-23 RX ORDER — ONDANSETRON 2 MG/ML
8 INJECTION INTRAMUSCULAR; INTRAVENOUS EVERY 8 HOURS PRN
Status: DISCONTINUED | OUTPATIENT
Start: 2017-01-23 | End: 2017-01-25 | Stop reason: HOSPADM

## 2017-01-23 RX ORDER — OXYCODONE HYDROCHLORIDE 5 MG/1
5 TABLET ORAL EVERY 6 HOURS PRN
Status: DISCONTINUED | OUTPATIENT
Start: 2017-01-23 | End: 2017-01-25 | Stop reason: HOSPADM

## 2017-01-23 RX ORDER — MAGNESIUM SULFATE HEPTAHYDRATE 40 MG/ML
4 INJECTION, SOLUTION INTRAVENOUS EVERY 4 HOURS PRN
Status: DISCONTINUED | OUTPATIENT
Start: 2017-01-23 | End: 2017-01-25 | Stop reason: HOSPADM

## 2017-01-23 RX ORDER — FLUORIDE TOOTHPASTE
5 TOOTHPASTE DENTAL 4 TIMES DAILY PRN
Status: DISCONTINUED | OUTPATIENT
Start: 2017-01-23 | End: 2017-01-25 | Stop reason: HOSPADM

## 2017-01-23 RX ORDER — ACETAMINOPHEN 325 MG/1
325 TABLET ORAL EVERY 6 HOURS PRN
Status: DISCONTINUED | OUTPATIENT
Start: 2017-01-23 | End: 2017-01-23

## 2017-01-23 RX ADMIN — CYCLOSPORINE 250 MG: 100 CAPSULE, LIQUID FILLED ORAL at 20:57

## 2017-01-23 RX ADMIN — DICLOFENAC SODIUM 2 G: 10 GEL TOPICAL at 20:57

## 2017-01-23 RX ADMIN — LEVOFLOXACIN 500 MG: 500 TABLET, FILM COATED ORAL at 20:57

## 2017-01-23 RX ADMIN — SODIUM CHLORIDE 1000 ML: 900 INJECTION, SOLUTION INTRAVENOUS at 10:05

## 2017-01-23 RX ADMIN — ACETAMINOPHEN 500 MG: 500 TABLET, FILM COATED ORAL at 17:53

## 2017-01-23 RX ADMIN — SODIUM CHLORIDE: 9 INJECTION, SOLUTION INTRAVENOUS at 17:54

## 2017-01-23 RX ADMIN — ZINC OXIDE: 200 OINTMENT TOPICAL at 19:01

## 2017-01-23 RX ADMIN — FLUCONAZOLE 200 MG: 200 TABLET ORAL at 20:57

## 2017-01-23 ASSESSMENT — ENCOUNTER SYMPTOMS
FLANK PAIN: 0
NAUSEA: 0
LIGHT-HEADEDNESS: 0
DECREASED CONCENTRATION: 0
ACTIVITY CHANGE: 0
DIFFICULTY URINATING: 0
VOMITING: 0
COLOR CHANGE: 0
CONFUSION: 0
RHINORRHEA: 0
CHEST TIGHTNESS: 0
JOINT SWELLING: 0
HEADACHES: 0
ANAL BLEEDING: 1
ADENOPATHY: 0
DIARRHEA: 0
NECK STIFFNESS: 0
APPETITE CHANGE: 0
DYSPHORIC MOOD: 0
WEAKNESS: 1
FATIGUE: 1
BLOOD IN STOOL: 1
WOUND: 0
CHILLS: 1
ARTHRALGIAS: 0
NUMBNESS: 0
DIZZINESS: 0
SHORTNESS OF BREATH: 0
EYE REDNESS: 0
FEVER: 0
SINUS PRESSURE: 0
BRUISES/BLEEDS EASILY: 1
ABDOMINAL PAIN: 0

## 2017-01-23 NOTE — PHARMACY-ADMISSION MEDICATION HISTORY
"Admission medication history interview status for the 2017 admission is complete. See Epic admission navigator for allergy information, pharmacy, prior to admission medications and immunization status.     Medication history interview sources:   Patient and pill bottles    Changes made to PTA medication list (reason)  Added: none  Deleted: Gengraft 100 mg (per patient, she takes cyclosporine modified (Generic equivalent), lidocaine 5% patch (per patient, she no longer uses this medication)  Changed: none    Additional medication history information (including reliability of information, actions taken by pharmacist):    Takes tylenol 650 mg PO daily prn for headache    Not currently taking Caltrate, multivitamin, Chlorpheniramine, Benadryl, and Loratadine (seasonal) due to \"too many meds.\" However, these medications still remain on her medication list.    Reported started Eltrombopag on 17 with total 4 doses through yesterday    Patient reports taking Senna S 1 tab PO bid prn    Patient complained of dry mouth and having difficulty swallowing the cyclosporine 100mg tabs \"because it's so big and has gotten stuck on the side of mouth.\" Tried Biotene mouth spray before but not helping, and she \"didn't like it.\"    Prior to Admission Medications   Prescriptions Last Dose Informant Patient Reported? Taking?   Acetaminophen (TYLENOL PO) 2017 at am  Self Yes Yes   Sig: Take 325 mg by mouth every 6 hours as needed for mild pain or fever   COMPRESSION STOCKINGS  Self No No   Sig: Wear compression stockings at 20-30 mmHg rating most time during the day to the affected leg (left leg) or both legs. Take them off at night.   DiphenhydrAMINE HCl (BENADRYL ALLERGY PO) 2017 Self Yes No   Sig: Take 25 mg by mouth At Bedtime    MULTIVITAMIN TABS   OR 2017 Self No No   Si tablet daily   Patient taking differently: 1 tablet by mouth daily   ORDER FOR DME   No No   Sig: Equipment being ordered: knee " high compression stockings- 18-20 mm   amLODIPine (NORVASC) 5 MG tablet 1/23/2017 at am  No Yes   Sig: Take 1 tablet (5 mg) by mouth daily   calcium-vitamin D (CALTRATE) 600-400 MG-UNIT per tablet 1/9/2017 Self Yes No   Sig: Take 1 tablet by mouth daily.     chlorpheniramine (CHLOR-TRIMETON) 4 MG tablet Past Month at Unknown time Self Yes Yes   Sig: Take 4 mg by mouth every 6 hours as needed. For head cold    cycloSPORINE modified (GENERIC EQUIVALENT) 100 MG capsule 1/23/2017 at AM  Yes Yes   Sig: Take 200 mg by mouth 2 times daily along with two 25 mg capsules for a total of 250 mg twice daily   cycloSPORINE modified (GENERIC EQUIVALENT) 25 MG capsule 1/23/2017 at am  No Yes   Sig: Take 2 capsules (50 mg) by mouth 2 times daily Along with two 100 mg capsules   diclofenac (VOLTAREN) 1 % GEL topical gel 1/23/2017 at Unknown time  No Yes   Sig: Apply 2 g topically 2 times daily   eltrombopag (PROMACTA) 50 MG tablet 1/22/2017 at pm  No Yes   Sig: Take 1 tablet (50 mg) by mouth daily Administer on an empty stomach, 1 hour before or 2 hours after a meal.   fluconazole (DIFLUCAN) 200 MG tablet 1/22/2017 at pm  Yes Yes   Sig: Take 1 tablet (200 mg) by mouth daily   levofloxacin (LEVAQUIN) 500 MG tablet 1/22/2017 at pm  No Yes   Sig: Take 1 tablet (500 mg) by mouth daily   loratadine (CLARITIN) 10 MG tablet Past Month at Unknown time Self Yes Yes   Sig: Take 10 mg by mouth daily as needed. For head cold    oxyCODONE (ROXICODONE) 5 MG IR tablet 1/22/2017 at 1 dose  No Yes   Sig: Take 1 tablet (5 mg) by mouth every 6 hours as needed for moderate to severe pain   pantoprazole (PROTONIX) 40 MG EC tablet 1/23/2017 at am  No Yes   Sig: Take 1 tablet (40 mg) by mouth every morning   predniSONE (DELTASONE) 20 MG tablet 1/23/2017 at am  No Yes   Sig: Take 3 tablets (60 mg) by mouth daily   senna-docusate (SENNA S) 8.6-50 MG per tablet 1/23/2017 at am  No Yes   Sig: Take 1-2 tablets by mouth 2 times daily as needed for constipation       Facility-Administered Medications: None     Medication history completed by: Liam Santos, PharmD student

## 2017-01-23 NOTE — IP AVS SNAPSHOT
[address] [address]    Phone:  [phone]                                       After Visit Summary   1/23/2017    Bonny Raymundo    MRN: [id number]           After Visit Summary Signature Page     I have received my discharge instructions, and my questions have been answered. I have discussed any challenges I see with this plan with the nurse or doctor.    ..........................................................................................................................................  Patient/Patient Representative Signature      ..........................................................................................................................................  Patient Representative Print Name and Relationship to Patient    ..................................................               ................................................  Date                                            Time    ..........................................................................................................................................  Reviewed by Signature/Title    ...................................................              ..............................................  Date                                                            Time

## 2017-01-23 NOTE — IP AVS SNAPSHOT
MRN:[id number]                      After Visit Summary   1/23/2017    Bonny Raymundo    MRN: [id number]           Thank you!     Thank you for choosing [facility] for your care. Our goal is always to provide you with excellent care. Hearing back from our patients is one way we can continue to improve our services. Please take a few minutes to complete the written survey that you may receive in the mail after you visit with us. Thank you!        Patient Information     Date Of Birth          1945        About your hospital stay     You were admitted on:  January 23, 2017 You last received care in the:  Unit [facility]    You were discharged on:  January 25, 2017        Reason for your hospital stay       Admission for rectal bleeding, presumed due to hemorrhoid in setting of low platelets.                  Who to Call     For medical emergencies, please call 911.  For non-urgent questions about your medical care, please call your primary care provider or clinic, [phone]          Attending Provider     Provider    Pelon Barcenas MD Cooley, Sarah Anne, MD       Primary Care Provider Office Phone # Fax #    Rafita Spencer Rogel [phone]        [facility] [address] [address]         When to contact your care team       Call [facility] cancer clinic triage line at [phone] for any of the following: temperature >100.4, recurrent rectal bleeding, any new bleeding not relieved with pressure, uncontrolled nausea/vomiting/diarrhea/constipation, unrelieved pain, dizziness, chest pain, shortness of breath, loss of consciousness, and any new or concerning symptoms.                  After Care Instructions     Activity       Your activity upon discharge: activity as tolerated            Diet       Follow this diet upon discharge: Regular diet            IV access       **Ordering Provider MUST call/page Care Coordinator/  to discuss arranging this service**    You are going home with the following vascular access device: PICC.                  Follow-up Appointments     Follow Up and recommended labs and tests       1. Follow-up as scheduled to see Dr. Rogel on 1/26/17. Your lab/possible transfusion appointments will be adjusted to coordinate with this visit.   2. Depending on your labs and transfusion needs in clinic on 1/26, your next appointment for labs and possible transfusion will be determined.   3. You are discharging with a REDUCED Cyclosporine dose. This is now 200 mg twice a day.   4. STOP taking your Norvasc (amlodipine) as your blood pressures have been better and we anticipate your blood pressures to remain normal with the lower dose of the Cyclosporine medication.   5. Continue to take Senna-S to help with moving and softening your bowels. You can take 1-2 tablets up to twice a day. Can start with 1-2 tablets daily. If no bowel movement within 24 hours, can increase the dosing to 1-2 tablets twice a day. HOLD if you develop diarrhea. Then resume taking if no bowel movement in 24 hours.    6. We have ADDED Miralax (to help move your bowels). Start taking 1 packet daily. If you start to have loose stools/diarrhea or multiple bowel movements, it is ok to hold this medication. Then resume if no bowel movement after 24-48hours.  7. The outpatient team can coordinate when it will be best for you to undergo an outpatient colonoscopy, as discussed with the Gastroenterology (GI) team that saw you in the hospital.  8. We are INCREASING your Eltrombopag. Begin taking 150 mg (3 of the 50mg tablets) starting on 1/25 evening. This was directed by Dr. Rogel.                  Your next 10 appointments already scheduled     Jan 26, 2017  2:30 PM   [facility] with  [facility]   [facility] ([facility])    [address]  2nd Floor  [facility] [address] [phone]             Jan 26, 2017  3:00 PM   RETURN ONC with Rafita Rogel MD   [facility] ([facility])    [address]  2nd Floor  [facility] [place] [address] [phone]            Jan 26, 2017  4:00 PM   Infusion 120 with UC ONCOLOGY INFUSION, UC 19 ATC   [facility] ([facility])    [address]  2nd Floor  [facility] [place] [address] [phone]            Jan 29, 2017  9:00 AM   Level 5 with SOUTH CHAIR 2   [facility] ([facility])    [facility] [place]  [address]  [place] [address]   [phone]            Jan 31, 2017  9:00 AM   Level 5 with SOUTH CHAIR 1   [facility] ([facility])    [address] [facility] [place]  [address]  [place] [address]   [phone]            Feb 02, 2017  9:30 AM   Level 5 with NORTH CHAIR 3   [facility] ([facility])    [address] [facility] [place]  [address]  [place] [address]   [phone]            Feb 05, 2017  9:00 AM   Level 5 with SOUTH CHAIR 4   [facility] ([facility])    [address] [facility] [place]  [address]  [place] [address]   [phone]            Feb 07, 2017  9:00 AM   Level 5 with SOUTH CHAIR 2   [facility] ([facility])    [address] [facility] [place]  [address]  [place] [address]   [phone]            Feb 10, 2017  9:30 AM   (Arrive by 9:15 AM)   Return Visit with Celine Walls PA-C   [facility] ([facility])    [address]  2nd Floor  [facility] [place] [address] [phone]              Additional Services     Home Care PT Referral for  Hospital Discharge       [facility].  Phone  [phone]  Fax  [phone]    PT to eval and treat    Your provider has ordered home care - physical therapy. If you have not been contacted within 2 days of your discharge please call the department phone number listed on the top of this document.            Home care nursing referral       [facility].  Phone  [phone]  Fax  [phone]    RN skilled nursing visit. RN to assess vital signs and weight, respiratory and cardiac status, pain level and activity tolerance, hydration, nutrition and bowel status, and home safety.  RN to teach medication management.  RN to assess need for home health aide.    Your provider has ordered home care nursing services. If you have not been contacted within 2 days of your discharge please call the inpatient department phone number at [phone] .                  Further instructions from your care team       _______________________________________________________________  Transportation  [place] [facility] [employer]  Rides are available to any [facility], [facility] clinic or [facility] Adult Day program for adults age 55 plus or to people who are temporarily disabled and unable to drive themselves. Rides must be scheduled within the [facility] area and within 20 miles of [place]. Rides must be scheduled at least 24 hours in advance. The cost to the patient is $5 per round trip (prices subject to change). Services not available to Assisted Living or Care Center sites that provide transportation. Our hours are Monday-Thursday, 7a.m. - 5:30 p.m. and Fridays, 7 a.m. to 3:30 p.m. Call [phone] or e-mail [email or web address]  ________________________________________________________________________________________________    [facility] [employer]  Staff Member Name: Danielle White  Email:  [email or web address]  Address: [address] [place] [address] [address] [place], [place] [address]  Phone number:  [phone]    Pending Results     No orders found from 2017 to 2017.            Statement of Approval     Ordered          17 1140  I have reviewed and agree with all the recommendations and orders detailed in this document.   EFFECTIVE NOW     Approved and electronically signed by:  Lana Chaney PA-C             Admission Information        Provider Department Dept Phone    2017 Laisha Joseph MD [facility] [phone]      Your Vitals Were     Blood Pressure Pulse Temperature    121/69 mmHg 85 96.8  F (36  C) (Oral)    Respirations Height Weight    18 1.6 m (5' 3\") 60.737 kg (133 lb 14.4 oz)    BMI (Body Mass Index) Pulse Oximetry       23.73 kg/m2 98%       MyChart Information     [employer] lets you send messages to your doctor, view your test results, renew your prescriptions, schedule appointments and more. To sign up, go to [email or web address] . Click on \"Log in\" on the left side of the screen, which will take you to the Welcome page. Then click on \"Sign up Now\" on the right side of the page.     You will be asked to enter the access code listed below, as well as some personal information. Please follow the directions to create your username and password.     Your access code is: [email or web address]  Expires: 3/15/2017 11:47 AM     Your access code will  in 90 days. If you need help or a new code, please call your [facility] clinic or [phone].        Care EveryWhere ID     This is your Care EveryWhere ID. This could be used by other organizations to access your [facility] medical records  [id number]           Review of your medicines      START taking        Dose / Directions    polyethylene glycol Packet   Commonly known as:  MIRALAX/GLYCOLAX   Used for:  Hemorrhoids, unspecified hemorrhoid type        Dose:  17 g   Take 17 g by mouth daily . If you start to have loose stools/diarrhea or multiple bowel movements, ok to hold this medication. Then resume if no bowel " movement after 24-48hours.   Quantity:  14 packet   Refills:  0         CONTINUE these medicines which may have CHANGED, or have new prescriptions. If we are uncertain of the size of tablets/capsules you have at home, strength may be listed as something that might have changed.        Dose / Directions    BENADRYL ALLERGY 25 MG tablet   This may have changed:    - medication strength  - when to take this  - reasons to take this   Generic drug:  diphenhydrAMINE        Dose:  25 mg   Take 1 tablet (25 mg) by mouth nightly as needed   Quantity:  56 tablet   Refills:  0       cycloSPORINE modified 100 MG capsule   Commonly known as:  GENERIC EQUIVALENT   This may have changed:    - additional instructions  - Another medication with the same name was removed. Continue taking this medication, and follow the directions you see here.        Dose:  200 mg   Take 2 capsules (200 mg) by mouth 2 times daily . This reflects a dose change.   Quantity:  120 capsule   Refills:  0       eltrombopag 50 MG tablet   Commonly known as:  PROMACTA   This may have changed:    - how much to take  - additional instructions   Used for:  Pancytopenia (H), Idiopathic aplastic anemia (H)        Dose:  150 mg   Take 3 tablets (150 mg) by mouth daily Administer on an empty stomach, 1 hour before or 2 hours after a meal. This represents an INCREASE in the dose, as per Dr. Rogel.   Quantity:  30 tablet   Refills:  1       SENNA S 8.6-50 MG per tablet   This may have changed:    - how much to take  - how to take this  - when to take this  - reasons to take this  - additional instructions   Used for:  Left-sided low back pain without sciatica, unspecified chronicity, Constipation, unspecified constipation type   Generic drug:  senna-docusate        Can take 1-2 tablets up to twice a day. Can start with 1-2 tablets daily. If no bowel movement within 24 hours, can take 1-2 tablets twice a day. HOLD if you develop diarrhea. Resume taking if no bowel  movement in 24 hours.   Quantity:  100 tablet   Refills:  1         CONTINUE these medicines which have NOT CHANGED        Dose / Directions    chlorpheniramine 4 MG tablet   Commonly known as:  CHLOR-TRIMETON        Dose:  4 mg   Take 4 mg by mouth every 6 hours as needed. For head cold   Refills:  0       COMPRESSION STOCKINGS   Used for:  DVT (deep venous thrombosis), left, Postphlebitic syndrome        Wear compression stockings at 20-30 mmHg rating most time during the day to the affected leg (left leg) or both legs. Take them off at night.   Quantity:  2 each   Refills:  2       diclofenac 1 % Gel topical gel   Commonly known as:  VOLTAREN   Used for:  Myofascial pain        Dose:  2 g   Apply 2 g topically 2 times daily   Quantity:  100 g   Refills:  1       fluconazole 200 MG tablet   Commonly known as:  DIFLUCAN   Used for:  Pancytopenia (H)        Dose:  200 mg   Take 1 tablet (200 mg) by mouth daily   Quantity:  30 tablet   Refills:  3       levofloxacin 500 MG tablet   Commonly known as:  LEVAQUIN   Used for:  Pancytopenia (H)        Dose:  500 mg   Take 1 tablet (500 mg) by mouth daily   Quantity:  30 tablet   Refills:  3       loratadine 10 MG tablet   Commonly known as:  CLARITIN        Dose:  10 mg   Take 10 mg by mouth daily as needed. For head cold   Refills:  0       order for DME   Used for:  DVT prophylaxis        Equipment being ordered: knee high compression stockings- 18-20 mm   Quantity:  1 Box   Refills:  1       oxyCODONE 5 MG IR tablet   Commonly known as:  ROXICODONE   Used for:  Left-sided low back pain without sciatica, unspecified chronicity        Dose:  5 mg   Take 1 tablet (5 mg) by mouth every 6 hours as needed for moderate to severe pain   Quantity:  50 tablet   Refills:  0       pantoprazole 40 MG EC tablet   Commonly known as:  PROTONIX   Used for:  Gastroesophageal reflux disease without esophagitis        Dose:  40 mg   Take 1 tablet (40 mg) by mouth every morning   Quantity:   30 tablet   Refills:  1       predniSONE 20 MG tablet   Commonly known as:  DELTASONE   Used for:  Pancytopenia (H), Idiopathic aplastic anemia (H)        Dose:  1 mg/kg   Take 3 tablets (60 mg) by mouth daily   Quantity:  42 tablet   Refills:  0       TYLENOL PO        Dose:  325 mg   Take 325 mg by mouth every 6 hours as needed for mild pain or fever   Refills:  0         STOP taking     amLODIPine 5 MG tablet   Commonly known as:  NORVASC           calcium-vitamin D 600-400 MG-UNIT per tablet   Commonly known as:  CALTRATE           MULTIVITAMIN TABS   OR                Where to get your medicines      Some of these will need a paper prescription and others can be bought over the counter. Ask your nurse if you have questions.     Bring a paper prescription for each of these medications    - polyethylene glycol Packet             Protect others around you: Learn how to safely use, store and throw away your medicines at www.disposemymeds.org.             Medication List: This is a list of all your medications and when to take them. Check marks below indicate your daily home schedule. Keep this list as a reference.      Medications           Morning Afternoon Evening Bedtime As Needed    BENADRYL ALLERGY 25 MG tablet   Take 1 tablet (25 mg) by mouth nightly as needed   Generic drug:  diphenhydrAMINE                                   chlorpheniramine 4 MG tablet   Commonly known as:  CHLOR-TRIMETON   Take 4 mg by mouth every 6 hours as needed. For head cold                                   COMPRESSION STOCKINGS   Wear compression stockings at 20-30 mmHg rating most time during the day to the affected leg (left leg) or both legs. Take them off at night.                                cycloSPORINE modified 100 MG capsule   Commonly known as:  GENERIC EQUIVALENT   Take 2 capsules (200 mg) by mouth 2 times daily . This reflects a dose change.   Last time this was given:  200 mg on 1/25/2017  8:37 AM            800            800               diclofenac 1 % Gel topical gel   Commonly known as:  VOLTAREN   Apply 2 g topically 2 times daily   Last time this was given:  2 g on 1/25/2017  7:12 AM            800           800               eltrombopag 50 MG tablet   Commonly known as:  PROMACTA   Take 3 tablets (150 mg) by mouth daily Administer on an empty stomach, 1 hour before or 2 hours after a meal. This represents an INCREASE in the dose, as per Dr. Rogel.   Last time this was given:  50 mg on 1/24/2017  7:54 PM                                   fluconazole 200 MG tablet   Commonly known as:  DIFLUCAN   Take 1 tablet (200 mg) by mouth daily   Last time this was given:  200 mg on 1/24/2017  7:53 PM            800                       levofloxacin 500 MG tablet   Commonly known as:  LEVAQUIN   Take 1 tablet (500 mg) by mouth daily   Last time this was given:  500 mg on 1/24/2017  7:53 PM                1100                   loratadine 10 MG tablet   Commonly known as:  CLARITIN   Take 10 mg by mouth daily as needed. For head cold                                   order for DME   Equipment being ordered: knee high compression stockings- 18-20 mm                                oxyCODONE 5 MG IR tablet   Commonly known as:  ROXICODONE   Take 1 tablet (5 mg) by mouth every 6 hours as needed for moderate to severe pain                                   pantoprazole 40 MG EC tablet   Commonly known as:  PROTONIX   Take 1 tablet (40 mg) by mouth every morning   Last time this was given:  40 mg on 1/25/2017  8:37 AM            800                       polyethylene glycol Packet   Commonly known as:  MIRALAX/GLYCOLAX   Take 17 g by mouth daily . If you start to have loose stools/diarrhea or multiple bowel movements, ok to hold this medication. Then resume if no bowel movement after 24-48hours.            800                       predniSONE 20 MG tablet   Commonly known as:  DELTASONE   Take 3 tablets (60 mg) by mouth daily   Last  time this was given:  1/25/2017  8:37 AM            800                       SENNA S 8.6-50 MG per tablet   Can take 1-2 tablets up to twice a day. Can start with 1-2 tablets daily. If no bowel movement within 24 hours, can take 1-2 tablets twice a day. HOLD if you develop diarrhea. Resume taking if no bowel movement in 24 hours.   Last time this was given:  2 tablets on 1/25/2017  8:37 AM   Generic drug:  senna-docusate            800           800               TYLENOL PO   Take 325 mg by mouth every 6 hours as needed for mild pain or fever   Last time this was given:  500 mg on 1/25/2017  4:33 AM

## 2017-01-23 NOTE — PROGRESS NOTES
Nursing Focus: Admission  D: Arrived at 1700 from ED via litter. Patient accompanied by self. Admitted for lower GI bleed. Complains of mild back pain and buttock discomfort d/t BM's/bleed.      I: Admission process began.  Patient oriented to room, enviroment, call light.  MD notified of patient's arrival on unit.     A: Vital signs stable, afebrile.  Patient stable at this time.  Complaining of mild buttock discomfort, tylenol x1.  Up w/ SBA, calling appropriately.  Pt endorses decreased appetite recently.  SOB on exertion.  CBC ordered, needs to be drawn.  Orders to transfuse plts <30, hgb <8. Continue to monitor.  Smear of BM on toilet paper w/ bright red blood noted.  Pt requesting zinc oxide cream, waiting to arrive from pharmacy.     P: Implement plan of care when available. Continue to monitor patient. Nursing interventions as appropriate. Notify MD with changes in pt status.    Platelet count 57k, hgb 8.0. Will need 1 unit RBC this severo (unit prepared).

## 2017-01-23 NOTE — H&P
[facility], [place]    History and Physical  Hematology / Oncology     Date of Admission:  1/23/2017  Date of Service (when I saw the patient): 01/23/2017    Assessment and Plan  Bonny Raymundo is a 71 year old female with PMH significant for h/o DVT/PE, HTN during rent admission, and idiopathic aplastic anemia (s/p ATG with ongoing cyclosporine, Prednisone and Eltrombopag) who presents with bright red blood per rectum.     GI:   # Lower GI bleed. Had stool with passage of dark red clot overnight (1/22-1/23) after approx 3-4 days of no BM. This was after pt presented to clinic for count check on 1/22 and received 1U PRBCs for Hgb 7.8. After bleeding noted overnight, pt presented to ED on 1/23 AM and at that time Hgb was 9.1 and Plt count 7K. Coags WNL. She has since had 2 soft/formed stools in the ED (starting approx 10 hours after her overnight stool), both with red blood per rectum but no passage of clots per her report. Does not have h/o hemorrhoids, nor rectal pain. Small, non-tender and non-bleeding external hemorrhoid appreciated on exam. Pt endorses some mild abdominal discomfort, cramping and intermittent nausea, but no rosie abdominal pain. No vomiting.   - trend CBC q8hr for now, or more frequently pending further GI recs. Transfuse to maintain Hgb >8 and Plt >30K (given difficulty in increasing her Plts above this in recent past)  - IVF support  - GI consult; pt discussed with team and they will see pt  - diet as tolerated until further GI assessment    # Hyperbilirubinemia. Tbili 2.0 (direct 1.3). This is increased from total bili 0.3 on 1/9. ?relation to therapy drugs. No RUQ pain, other LFTs WNL.   - trend with next CMP in AM    HEME:  # Aplastic anemia. Initiated treatment with ATG, cyclosporine, steroid (MP followed by Pred) and eltrombopag; D1=1/9). She was discharged to continue Cyclosporine 250mg BID and Prednisone 60mg daily. She resumed eltrombopag on 1/17 (after  drug had to be delivered to her house).   - continue cyclosporine (1/20 drug level was 384 (in range 200-400))  - continue Prednisone 60mg daily. Per clinic note, continue through 1/27, then taper.   - continue eltrombopag 50mg daily    # Pancytopenia. 2/2 aplastic anemia and recent therapy.   - CBC monitoring and transfusion parameters as above    ID:  # PPX.  - continue PTA fluconazole 200 mg daily, levaquin 500 mg daily     CV/PV:  # Hypertension. Noted during recent admission, thought possible 2/2 drugs on treatment regimen. Currently managed with Norvasc. BPs generally 120s-130s/70s-80s since presentation to ED.   - will hold next AM dose of Norvasc until can get a clearer picture of this bleeding and ensure ongoing HD stability. She is currently HD stable.     # Hx DVT/PE. Pt had 2 episodes of provoked LLE DVTs in 2012 and 2014 (travel related) with associated PE in 2012. Treated in the past with warfarin and lovenox. Transitioned to ASA which was stopped in the setting of thrombocytopenia. On no anticoagulation or antiplatelet agent at this time.     Renal:   # ELSA. Appears baseline Cr ranges 0.7-0.9 with GFR >60. On presentation today, Cr 1.1 (GFR 48). Likely 2/2 hypovolemia. Of note, pt had reported pink tinged urine at recent clinic visit. Had UA done (1/19) which was positive for moderate blood and 4 WBCs, no reflex to culture. Pt denies any urinary sxs at this time.   - S/p 1L bolus in ED. Will continue fluids with NS @75 cc/hr.   - will repeat UA today and send urine culture  - trend with next CMP in AM    MSK:   # Low back pain. Improved with use of Tylenol and topical cream (diclofenac). Has not used oxycodone.     FEN:  - IVFs as above  - monitor lytes, replace per unit protocol  - nutrition consult given report of decreased appetite/PO intake    Prophylaxis/Misc:   # VTE Prophylaxis contraindicated due to thrombocytopenia  # GI. Continue PPI for GI ppx with steroids  # PT consulted.     Dispo:  Pending at this time.     Lana Chaney PA-C  Heme/Onc  [phone] (pager)    Code Status  Full Code    Primary Care Physician  Rafita Rogel    Chief Complaint  Bright red blood per rectum.     History of Present Illness  Bonny Raymundo is a 71 year old female with PMH significant for h/o DVT/PE, HTN during rent admission, and idiopathic aplastic anemia (s/p ATG with ongoing cyclosporine, Prednisone and Eltrombopag) who presents with bright red blood per rectum. The patient had a formed stool with passage of dark red clot overnight (1/22-1/23). She reports this was 3-4 days after no BM and she did push at the end, thinking she was still having a BM. She then passed the presumed clot and saw blood in the toilet and on tissue paper. This was after pt had presented to clinic on 1/22 for count check and received 1U PRBCs for Hgb 7.8. After bleeding noted overnight, pt presented to ED on 1/23 AM and at that time Hgb was 9.1 and Plt count 7K (recently ranging generally 5-33 since Sept 2016). In the ED, she had 2 soft/formed stools (the first approx 10 hours after her overnight stool and the second approximately 1.5 hrs after that), each with less bright red blood compared to each prior stools. She did have some drops of blood in her underwear as well. She denies any acute or pinpoint abdominal pain. She has some generalized abdominal discomfort that is not new, also has some intermittent abdominal cramping. Is taking 1 Senna-S tab a day and her BM overnight was the first in 3-4 days. Reports intermittent, mild naudea-like feeling and poor appetite. Tries to drink fluids, as this also helps with ongoing dry mouth. However, her brother admits concern that she is not eating well enough at home and she lives alone. She reports that she has been eating but mainly yogurt, applesauce, cottage cheese, etc.     She otherwise has a mild HA (wouldn't necessarily need to take anything for this). Denies any mouth pain  "or sores, just dryness. Her SOB/dyspnea on exertion is the same. She states \"I tell people that I can't walk and talk\" but that is stable in the last 3-4 months. Review of GI system is as above. States she reported some \"pink tinged\" color on the tissue after voiding several days ago; mentioned this at a recent clinic visit and requested a UA. This was done on . She denies any further urine changes or pain/burning with urination at this time. She has h/o Left LE clots. Does think her legs are edematous to her, more on the right after feeling like her \"bursa\" popped in her right knee and felt some tenderness in her R calf. She has a RLE US, which was negative for clot (). She is now wearing a R knee brace. She reports overall feeling worse than when she was recently in the hospital. Was recently have more difficulty with left lower back pain, but this is improving with the use of topical cream (diclofenac).     Past Medical History   Past Medical History   Diagnosis Date     Osteoporosis, unspecified      Allergic rhinitis due to other allergen      Headache(784.0)      Sensorineural hearing loss, unspecified      Closed anterior dislocation of humerus      Sprain of ankle, unspecified site      L     DVT of lower extremity (deep venous thrombosis) (H) 10-12     Left after prolonged sitting-plane     Pulmonary emboli (H) 10-12     DVT, recurrent, lower extremity, acute (H)      Past Surgical History  Past Surgical History   Procedure Laterality Date     C nonspecific procedure            C nonspecific procedure       hysterectomy/BSO     C nonspecific procedure       myomectomy (fibroids)     Hc colonoscopy thru stoma, diagnostic       normal- minimal diverticulosis     C dexa interpretation, axial  03     Arthrodesis foot  11     Hallux valgus R-1st MP joint     Cataract iol, rt/lt Right      Cataract iol, rt/lt Left      Blepharoplasty bilateral  ,      Exchange " intraocular lens implant Right 2015     Procedure: EXCHANGE INTRAOCULAR LENS IMPLANT;  Surgeon: Garrett Dawson MD;  Location:  [facility]     Vitrectomy parsplana with 23 gauge system Right 2015     Procedure: VITRECTOMY PARSPLANA WITH 23 GAUGE SYSTEM;  Surgeon: Racheal Loyd MD;  Location:  [facility]     Bone marrow biopsy, bone specimen, needle/trocar N/A 2016     Procedure: BIOPSY BONE MARROW;  Surgeon: Mu Morgan MD;  [facility]     Bone marrow biopsy, bone specimen, needle/trocar N/A 2016     Procedure: BIOPSY BONE MARROW;  Surgeon: Mu Morgan MD;  Location:  [facility]     Picc insertion Left 2017     5fr DL BioFlo PICC, 42cm (2cm external) in the L basilic vein w/ tip in the  SVC RA junction.         Prior to Admission Medications  Prior to Admission Medications   Prescriptions Last Dose Informant Patient Reported? Taking?   Acetaminophen (TYLENOL PO)  Self Yes No   Sig: Take 325 mg by mouth every 6 hours as needed for mild pain or fever   COMPRESSION STOCKINGS  Self No No   Sig: Wear compression stockings at 20-30 mmHg rating most time during the day to the affected leg (left leg) or both legs. Take them off at night.   DiphenhydrAMINE HCl (BENADRYL ALLERGY PO)  Self Yes No   Sig: Take 25 mg by mouth At Bedtime    GENGRAF 100 MG PO CAPSULE   No No   Sig: Take 2 capsules (200 mg) by mouth 2 times daily Along with two 25 mg capsules   MULTIVITAMIN TABS   OR  Self No No   Si tablet daily   ORDER FOR DME   No No   Sig: Equipment being ordered: knee high compression stockings- 18-20 mm   amLODIPine (NORVASC) 5 MG tablet   No No   Sig: Take 1 tablet (5 mg) by mouth daily   calcium-vitamin D (CALTRATE) 600-400 MG-UNIT per tablet  Self Yes No   Sig: Take 1 tablet by mouth daily.     chlorpheniramine (CHLOR-TRIMETON) 4 MG tablet  Self Yes No   Sig: Take 4 mg by mouth every 6 hours as needed. For head cold    cycloSPORINE modified (GENERIC EQUIVALENT) 100  MG capsule   Yes No   cycloSPORINE modified (GENERIC EQUIVALENT) 25 MG capsule   No No   Sig: Take 2 capsules (50 mg) by mouth 2 times daily Along with two 100 mg capsules   diclofenac (VOLTAREN) 1 % GEL topical gel   No No   Sig: Apply 2 g topically 2 times daily   eltrombopag (PROMACTA) 50 MG tablet   No No   Sig: Take 1 tablet (50 mg) by mouth daily Administer on an empty stomach, 1 hour before or 2 hours after a meal.   fluconazole (DIFLUCAN) 200 MG tablet   Yes No   Sig: Take 1 tablet (200 mg) by mouth daily   levofloxacin (LEVAQUIN) 500 MG tablet   No No   Sig: Take 1 tablet (500 mg) by mouth daily   lidocaine (LIDODERM) 5 % Patch   No No   Sig: Apply up to 3 patches to painful area at once for up to 12 h within a 24 h period.  Remove after 12 hours.   loratadine (CLARITIN) 10 MG tablet  Self Yes No   Sig: Take 10 mg by mouth daily as needed. For head cold    oxyCODONE (ROXICODONE) 5 MG IR tablet   No No   Sig: Take 1 tablet (5 mg) by mouth every 6 hours as needed for moderate to severe pain   pantoprazole (PROTONIX) 40 MG [facility] tablet   No No   Sig: Take 1 tablet (40 mg) by mouth every morning   predniSONE (DELTASONE) 20 MG tablet   No No   Sig: Take 3 tablets (60 mg) by mouth daily   senna-docusate (SENNA S) 8.6-50 MG per tablet   No No   Sig: Take 1-2 tablets by mouth 2 times daily as needed for constipation      Facility-Administered Medications: None     Allergies  Allergies   Allergen Reactions     Cats      Dogs      No Known Drug Allergies      Seasonal Allergies        Social History   She lives alone in [place]. She has a brother (Justo) who is supportive and he lives with his wife in [place]. The patient's son lives in [place], but her nephew and his wife is also local and can help out from time to time. She has a dog and a cat at home.     Social History     Social History     Marital Status:      Spouse Name: N/A     Number of Children: 1     Years of Education: N/A      Occupational History     purchasing [employer]      Social History Main Topics     Smoking status: Former Smoker     Quit date: 05/14/1973     Smokeless tobacco: Never Used     Alcohol Use: No      Comment: occasionally     Drug Use: No     Sexual Activity:     Partners: Male     Other Topics Concern     Not on file     Social History Narrative         Family History  Family History   Problem Relation Age of Onset     Musculoskeletal Disorder Mother      MS     HEART DISEASE Father      HEART DISEASE Brother      afib       Review of Systems  The 10 point Review of Systems is negative other than noted in the HPI or here.     Physical Exam  Temp: 97.9  F (36.6  C) Temp src: Oral BP: 133/84 mmHg Pulse: 101 Heart Rate: 104 Resp: 16 SpO2: 90 % O2 Device: None (Room air)    Vital Signs with Ranges  Temp:  [97.8  F (36.6  C)-97.9  F (36.6  C)] 97.9  F (36.6  C)  Pulse:  [] 101  Heart Rate:  [] 104  Resp:  [11-26] 16  BP: (121-152)/(72-87) 133/84 mmHg  SpO2:  [90 %-100 %] 90 %  135 lbs 0 oz  Constitutional: Pt seen sitting up in ER gurney, alert, interactive. Able to relay recent events accurately. NAD.   Eyes: Lids and lashes normal. Sclera clear, conjunctiva normal.  ENT: NC/AT. OP pink and moist (after drinking water), no lesions or thrush.   Respiratory: Able to speak in full sentences. No increased work of breathing, good air exchange, on RA. Lungs with inspiratory wheezing noted in b/l upper posterior fields, no crackles appreciated.    Cardiovascular: RRR, normal S1 and S2, no murmur noted.  GI: Normal BS. Abd is soft, non-distended, non-tender. Negative silva's sign.   : Small, non-tender and non-bleeding external hemorrhoid in proximal midline. Some remnant of smeared stool, but no blood.   Skin: Pale but clean, dry, intact.   Musculoskeletal: R knee with brace in place. No erythema or edema of calves b/l. No appreciable b/l LE edema, apart from sockline edema slightly greater on R>L. She is  able to move freely in bed and to standing position.   Neurologic: Awake, alert, oriented.  Cranial nerves II-XII are grossly intact. Gait normal.   Neuropsychiatric: Calm, normal eye contact, affect congruent, memory for past and recent events intact and thought process normal.  VAD: PICC line in LUE, c/d/i.     Data  Results for orders placed or performed during the hospital encounter of 01/23/17 (from the past 24 hour(s))   CBC with platelets differential   Result Value Ref Range    WBC 2.9 (L) 4.0 - 11.0 10e9/L    RBC Count 2.98 (L) 3.8 - 5.2 10e12/L    Hemoglobin 9.1 (L) 11.7 - 15.7 g/dL    Hematocrit 26.3 (L) 35.0 - 47.0 %    MCV 88 78 - 100 fl    MCH 30.5 26.5 - 33.0 pg    MCHC 34.6 31.5 - 36.5 g/dL    RDW 17.7 (H) 10.0 - 15.0 %    Platelet Count 7 (LL) 150 - 450 10e9/L    Diff Method Automated Method     % Neutrophils 18.4 %    % Lymphocytes 77.9 %    % Monocytes 3.1 %    % Eosinophils 0.3 %    % Basophils 0.0 %    % Immature Granulocytes 0.3 %    Nucleated RBCs 1 (H) 0 /100    Absolute Neutrophil 0.5 (L) 1.6 - 8.3 10e9/L    Absolute Lymphocytes 2.3 0.8 - 5.3 10e9/L    Absolute Monocytes 0.1 0.0 - 1.3 10e9/L    Absolute Eosinophils 0.0 0.0 - 0.7 10e9/L    Absolute Basophils 0.0 0.0 - 0.2 10e9/L    Abs Immature Granulocytes 0.0 0 - 0.4 10e9/L    Absolute Nucleated RBC 0.0    Basic metabolic panel   Result Value Ref Range    Sodium 142 133 - 144 mmol/L    Potassium 4.0 3.4 - 5.3 mmol/L    Chloride 108 94 - 109 mmol/L    Carbon Dioxide 25 20 - 32 mmol/L    Anion Gap 9 3 - 14 mmol/L    Glucose 116 (H) 70 - 99 mg/dL    Urea Nitrogen 33 (H) 7 - 30 mg/dL    Creatinine 1.11 (H) 0.52 - 1.04 mg/dL    GFR Estimate 48 (L) >60 mL/min/1.7m2    GFR Estimate If Black 58 (L) >60 mL/min/1.7m2    Calcium 8.5 8.5 - 10.1 mg/dL   Hepatic panel   Result Value Ref Range    Bilirubin Direct 1.3 (H) 0.0 - 0.2 mg/dL    Bilirubin Total 2.0 (H) 0.2 - 1.3 mg/dL    Albumin 2.6 (L) 3.4 - 5.0 g/dL    Protein Total 6.8 6.8 - 8.8 g/dL     Alkaline Phosphatase 104 40 - 150 U/L    ALT 74 (H) 0 - 50 U/L    AST 20 0 - 45 U/L   INR   Result Value Ref Range    INR 1.10 0.86 - 1.14   Partial thromboplastin time   Result Value Ref Range    PTT 23 22 - 37 sec   ABO/Rh type and screen   Result Value Ref Range    ABO A     RH(D)  Pos     Antibody Screen Neg     Test Valid Only At       [facility]    Specimen Expires 01/26/2017    Platelets prepare order unit   Result Value Ref Range    Ordered Component Type PLT Pheresis     Units Ordered 1    Blood component   Result Value Ref Range    Unit Number [id number]     Blood Component Type PlateletPheresis,LeukoRed Irrad (Part 2)     Division Number 00     Status of Unit Released to care unit 01/23/2017 1129     Blood Product Code [id number]     Unit Status ISS    Platelets prepare order unit   Result Value Ref Range    Ordered Component Type PLT Pheresis     Units Ordered 1

## 2017-01-23 NOTE — LETTER
Transition Communication Hand-off for Care Transitions to Next Level of Care Provider    Name: Bonny Raymundo  MRN #: [id number]  Primary Care Provider: Rafita Rogel      Primary Clinic:  [facility] [address] [facility] [address]  [facility] [address]     Reason for Hospitalization:  BRBPR (bright red blood per rectum) [K62.5]  Admit Date/Time: 1/23/2017  9:31 AM  Discharge Date: 1/25/2017  Payor Source: Payor: [facility] / Plan: [employer] / Product Type: HMO /     Bonny Raymundo is a 71 year old female with PMH significant for h/o DVT/PE, HTN  and idiopathic aplastic anemia (s/p ATG with ongoing cyclosporine, Prednisone and Eltrombopag) admitted with bright red blood per rectum    Discharge Plan:    Home with frequent clinic follow up. Referral made to [employer] for RN, PT and HHA.      Concern for non-adherence with plan of care:   Y/N No  Discharge Needs Assessment:  Needs       Most Recent Value    Equipment Currently Used at Home walker, rolling    Transportation Available family or friend will provide, other (see comments) [Patient given information for [employer]]    Other Resources Transportation Services, Meals on Wheels    [facility] [phone], Fax: [phone] [RN, PT, HHA]        Follow-up plan:  Future Appointments  Date Time Provider Department Center   1/26/2017 2:30 PM  [facility]   1/26/2017 3:00 PM Rafita Rogel MD [facility]   1/26/2017 4:00 PM [facility]   1/29/2017 9:00 AM SOUTH CHAIR 2 [facility]   1/31/2017 9:00 AM SOUTH CHAIR 1 [facility]   2/2/2017 9:30 AM NORTH CHAIR 3 [facility]   2/5/2017 9:00 AM SOUTH CHAIR 4 [facility]   2/7/2017 9:00 AM SOUTH CHAIR 2 [facility]   2/10/2017 9:00 AM [facility]   2/10/2017 9:30 AM Celine Walls PA-C [facility]   2/10/2017 10:30 AM [facility]       Any outstanding  tests or procedures:        Referrals     Future Labs/Procedures    Home care nursing referral     Comments:    [facility]  Phone  [phone]  Fax  [phone]    RN skilled nursing visit. RN to assess vital signs and weight, respiratory and cardiac status, pain level and activity tolerance, hydration, nutrition and bowel status, and home safety.  RN to teach medication management.  RN to assess need for home health aide.    Your provider has ordered home care nursing services. If you have not been contacted within 2 days of your discharge please call the inpatient department phone number at [phone] .    Home Care PT Referral for Hospital Discharge     Comments:    [facility] [facility]  Phone  [phone]  Fax  [phone]    PT to eval and treat    Your provider has ordered home care - physical therapy. If you have not been contacted within 2 days of your discharge please call the department phone number listed on the top of this document.            Ansley Stoner RNCC  Phone [phone]  Pager [phone]    AVS/Discharge Summary is the source of truth; this is a helpful guide for improved communication of patient story

## 2017-01-23 NOTE — ED PROVIDER NOTES
History     Chief Complaint   Patient presents with     Shortness of Breath     HPI  Bonny Raymundo is a 71 year old female with a history of idiopathic aplastic anemia, provoked DVT x 2 and PE, currently on treatment with ATG, cyclosporine, eltrombopag and steroids (since 2017, 2 weeks ago) who presents with bloody stool. The patient reports that she she had a bowel movement around 3:00 AM today, and during this bowel movement she passed a significant amount of  red blood after the stool. She states that she was told if she had any abnormal bleeding to come to the ED. She has never had red blood per rectum like this in the past. patient denies history of hemorrhoids trauma fissures diverticulitis colitis proctitis. She denies any abdominal pain, lightheadedness or dizziness. She denies diarrhea, and notes that her bowel movement this morning was soft stool. The patient is concerned that this may be related to the medication regimen she was started on a couple weeks ago. She had a blood transfusion at [facility] with 1 unit of PRBCs for her hemoglobin of 7.8. She has not had a bowel movement since the one she had at 3:00 AM today. She denies any history of colon polyps, diverticulitis or diverticulosis. She also denies any history of hemorrhoids.   patient denies abdominal pain no nausea vomiting.  No syncope.    Past Medical History   Diagnosis Date     Osteoporosis, unspecified      Allergic rhinitis due to other allergen      Headache(784.0)      Sensorineural hearing loss, unspecified      Closed anterior dislocation of humerus      Sprain of ankle, unspecified site      L     DVT of lower extremity (deep venous thrombosis) (H) 10-12     Left after prolonged sitting-plane     Pulmonary emboli (H) 10-12     DVT, recurrent, lower extremity, acute (H)        Past Surgical History   Procedure Laterality Date     C nonspecific procedure            C nonspecific procedure       hysterectomy/BSO     C  nonspecific procedure       myomectomy (fibroids)     Hc colonoscopy thru stoma, diagnostic  5-03     normal- minimal diverticulosis     C dexa interpretation, axial  8-20-03     Arthrodesis foot  7-18-11     Hallux valgus R-1st MP joint     Cataract iol, rt/lt Right 1988     Cataract iol, rt/lt Left 1985     Blepharoplasty bilateral  2012, 2014     Exchange intraocular lens implant Right 2/2/2015     Procedure: EXCHANGE INTRAOCULAR LENS IMPLANT;  Surgeon: Garrett Dawson MD;  Location:  [facility]     Vitrectomy parsplana with 23 gauge system Right 2/2/2015     Procedure: VITRECTOMY PARSPLANA WITH 23 GAUGE SYSTEM;  Surgeon: Racheal Loyd MD;  Location:  [facility]     Bone marrow biopsy, bone specimen, needle/trocar N/A 9/26/2016     Procedure: BIOPSY BONE MARROW;  Surgeon: Mu Morgan MD;  Location:  GI     Bone marrow biopsy, bone specimen, needle/trocar N/A 11/21/2016     Procedure: BIOPSY BONE MARROW;  Surgeon: Mu Morgan MD;  Location:  GI     Picc insertion Left 1/9/2017     5fr DL BioFlo PICC, 42cm (2cm external) in the L basilic vein w/ tip in the  SVC RA junction.       Family History   Problem Relation Age of Onset     Musculoskeletal Disorder Mother      MS     HEART DISEASE Father      HEART DISEASE Brother      afib       Social History   Substance Use Topics     Smoking status: Former Smoker     Quit date: 05/14/1973     Smokeless tobacco: Never Used     Alcohol Use: No      Comment: occasionally     Current Facility-Administered Medications   Medication     sodium chloride (PF) 0.9% PF flush 3 mL     sodium chloride (PF) 0.9% PF flush 3 mL     diclofenac (VOLTAREN) 1 % topical gel 2 g     diphenhydrAMINE (BENADRYL) capsule 25 mg     eltrombopag (PROMACTA) tablet 50 mg     fluconazole (DIFLUCAN) tablet 200 mg     levofloxacin (LEVAQUIN) tablet 500 mg     loratadine (CLARITIN) tablet 10 mg     oxyCODONE (ROXICODONE) IR tablet 5 mg     [START ON 1/24/2017]  pantoprazole (PROTONIX) EC tablet 40 mg     [START ON 1/24/2017] predniSONE (DELTASONE) tablet 60 mg     senna-docusate (SENOKOT-S;PERICOLACE) 8.6-50 MG per tablet 1-2 tablet     Medication Instruction     0.9% sodium chloride infusion     ondansetron (ZOFRAN) injection 8 mg    Or     ondansetron (ZOFRAN-ODT) ODT tab 8 mg     sodium chloride (PF) 0.9% PF flush 10-20 mL     sodium chloride (PF) 0.9% PF flush 10 mL     magnesium sulfate 2 g in NS intermittent infusion ([employer]ar[employer] or [facility] Cmpd)     magnesium sulfate 4 g in 100 mL sterile water (premade)     sodium phosphate 15 mmol in D5W intermittent infusion     sodium phosphate 20 mmol in D5W intermittent infusion     sodium phosphate 25 mmol in D5W intermittent infusion     artificial saliva (BIOTENE DRY MOUTHWASH) liquid 5 mL     naloxone (NARCAN) injection 0.1-0.4 mg     cycloSPORINE modified (GENERIC EQUIVALENT) capsule 250 mg     acetaminophen (TYLENOL) tablet 500 mg     zinc oxide 20 % ointment        Allergies   Allergen Reactions     Cats      Dogs      No Known Drug Allergies      Seasonal Allergies       I have reviewed the Medications, Allergies, Past Medical and Surgical History, and Social History in the Epic system.    Review of Systems   Constitutional: Positive for chills and fatigue. Negative for fever, activity change and appetite change.   HENT: Negative for congestion, postnasal drip, rhinorrhea and sinus pressure.    Eyes: Negative for redness and visual disturbance.   Respiratory: Negative for chest tightness and shortness of breath.    Cardiovascular: Negative for chest pain.   Gastrointestinal: Positive for blood in stool and anal bleeding. Negative for nausea, vomiting, abdominal pain and diarrhea.   Endocrine: Negative for polyuria.   Genitourinary: Negative for flank pain and difficulty urinating.   Musculoskeletal: Negative for joint swelling, arthralgias, gait problem and neck stiffness.   Skin: Positive for pallor. Negative for color  change, rash and wound.   Allergic/Immunologic: Positive for immunocompromised state.   Neurological: Positive for weakness. Negative for dizziness, light-headedness, numbness and headaches.   Hematological: Negative for adenopathy. Bruises/bleeds easily (aplastic anemia with history of thrombocytopenia).   Psychiatric/Behavioral: Negative for confusion, dysphoric mood and decreased concentration.   All other systems reviewed and are negative.      Physical Exam   BP: 130/78 mmHg  Pulse: 84  Temp: 97.8  F (36.6  C)  Resp: 18  Weight: 61.236 kg (135 lb)  SpO2: 98 %  Physical Exam   Constitutional: She is oriented to person, place, and time. She appears well-developed and well-nourished. She appears distressed.   Patient mild distress here with brother alert oriented pale is not writhing in pain   HENT:   Head: Normocephalic and atraumatic.   Eyes: Conjunctivae and EOM are normal. Pupils are equal, round, and reactive to light. No scleral icterus.   Neck: Normal range of motion. Neck supple. No JVD present.   Cardiovascular: Normal rate and regular rhythm.    Pulmonary/Chest: No stridor. No respiratory distress. She has no wheezes.   Abdominal: She exhibits no distension and no mass. There is no tenderness. There is no rebound and no guarding.   Genitourinary:   Patient had 1 more stool bright red blood per rectum here in the ER   Musculoskeletal: She exhibits no edema or tenderness.   Neurological: She is alert and oriented to person, place, and time. She has normal reflexes. No cranial nerve deficit. Coordination normal.   Skin: Skin is warm and dry. No rash noted. She is not diaphoretic. No erythema. No pallor.   Psychiatric:   Mildly flattened otherwise appropriate   Nursing note and vitals reviewed.      ED Course     [unfilled]  Procedures     9:39 AM  The patient was seen and examined by Dr. Barcenas in Room 21.           patient does have a PICC line in this was accessed labs are drawn in the ER.  A liter of  normal saline given in the ER.  EKG did not show ischemic changes.  Patient was continually monitored in the ER.  Patient placed on oxygen.  Labs reviewed in Epic patient was transfused yesterday hemoglobin was 7.8.  Today patient INR 1.1 direct bilirubin 1.3 total bilirubin 2ALT 74.  lucose 116 creatinine 1.11 BUN 33  latelets were 7 white count 2.9 hematoma 9.1.    Discussed case with hematology.  Patient was transfused 2units of platelets down here in the ER after consented.    Patient had chest x-ray also done without signs of acute infiltrate noted the PICC line that was in the azygous vein the PICC line staff did come in slightly withdrew the PICC line and resecured it.  Repeat x-ray shows slight improvement and per PICC line team this is appropriate and can be used    s noted patient otherwise stable I talked to hematology service they will manage the rectal bleeding etc. And felt comfortable with this.  I did offer to contact colorectal but they will do so per their discretion.  Patient stable down here in the ER informed her and her brother several times.    Patient admitted to hematology under the care Dr. Lee         EKG Interpretation:      Interpreted by Pelon Barcenas  Time reviewed:936   Symptoms at time of EKG: sob chronic   Rhythm: normal sinus   Rate: normal  Axis: NORMAL  Ectopy:one pvc  Conduction: normal  ST Segments/ T Waves: No ST-T wave changes  Q Waves: none  Comparison to prior: No old EKG available    Clinical Impression:nsr without ischemic changes      Critical Care time:  none               Labs Ordered and Resulted from Time of ED Arrival Up to the Time of Departure from the ED   CBC WITH PLATELETS DIFFERENTIAL - Abnormal; Notable for the following:     WBC 2.9 (*)     RBC Count 2.98 (*)     Hemoglobin 9.1 (*)     Hematocrit 26.3 (*)     RDW 17.7 (*)     Platelet Count 7 (*)     Nucleated RBCs 1 (*)     Absolute Neutrophil 0.5 (*)     All other components within normal limits    BASIC METABOLIC PANEL - Abnormal; Notable for the following:     Glucose 116 (*)     Urea Nitrogen 33 (*)     Creatinine 1.11 (*)     GFR Estimate 48 (*)     GFR Estimate If Black 58 (*)     All other components within normal limits   HEPATIC PANEL - Abnormal; Notable for the following:     Bilirubin Direct 1.3 (*)     Bilirubin Total 2.0 (*)     Albumin 2.6 (*)     ALT 74 (*)     All other components within normal limits   INR   PARTIAL THROMBOPLASTIN TIME   CARDIAC CONTINUOUS MONITORING   PULSE OXIMETRY NURSING   ABO/RH TYPE AND SCREEN   PLATELETS PREPARE ORDER UNIT   BLOOD COMPONENT   PLATELETS PREPARE ORDER UNIT   BLOOD COMPONENT     Results for orders placed or performed during the hospital encounter of 01/23/17   XR Chest 2 Views   Result Value Ref Range    Radiologist flags Left PICC position. (Urgent)     Narrative    Exam:  XR CHEST 2 VW, 1/23/2017 11:50 AM    History: Shortness of breath    Comparison:  Chest radiograph from 1/10/2017.    Findings:  PA and lateral views of the chest were obtained. Left PICC  tip projects over the azygos vein. Cardiomediastinal silhouette is  within normal limits. Pulmonary vasculature is unremarkable. Right  retrocardiac nodule corresponds to mass visualized on prior CT  examination. Trace left pleural effusion. No pneumothorax. Visualized  upper abdomen is unremarkable. Stable multilevel compression  deformities in the mid and lower thoracic spine.      Impression    Impression:    1. Left PICC tip terminates in the azygos vein.  2. Trace left pleural effusion.  3. Right retrocardiac opacity is better evaluated on prior CT  examination.    [Urgent Result: Left PICC position.]    Finding was identified on 1/23/2017 11:50 AM.     Dr. Barcenas was contacted by Dr. Pratt at 1/23/2017 11:57 AM and  verbalized understanding of the urgent finding.     I have personally reviewed the examination and initial interpretation  and I agree with the findings.    ISMAEL KHOURY MD    XR Chest Port 1 View   Result Value Ref Range    Radiologist flags Left PICC malposition (Urgent)     Narrative    Exam:  XR CHEST PORT 1 VW, 1/23/2017 1:51 PM    History: PICC placement    Comparison:  Chest radiograph from 10:37 AM on 1/23/2017.    Findings:  And glenohumeral AP view of the chest is obtained. Interval  traction of the right-sided PICC with the tip now projecting over the  proximal azygos vein. Cardiomediastinal silhouette is within normal  limits. There is no pleural effusion or pneumothorax. No focal  airspace opacity. The pulmonary vasculature is indistinct. The  visualized upper abdomen is unremarkable.      Impression    Impression:    1. Interval adjustment of the left sided PICC with the tip likely in  the proximal azygos vein.  2. No acute cardiopulmonary abnormality.    [Urgent Result: Left PICC malposition]    Finding was identified on 1/23/2017 1:51 PM.     Dr. Barcenas was contacted by Dr. Pratt at 1/23/2017 2:36 PM and  verbalized understanding of the urgent finding.     I have personally reviewed the examination and initial interpretation  and I agree with the findings.    ISMAEL KHOURY MD   CBC with platelets differential   Result Value Ref Range    WBC 2.9 (L) 4.0 - 11.0 10e9/L    RBC Count 2.98 (L) 3.8 - 5.2 10e12/L    Hemoglobin 9.1 (L) 11.7 - 15.7 g/dL    Hematocrit 26.3 (L) 35.0 - 47.0 %    MCV 88 78 - 100 fl    MCH 30.5 26.5 - 33.0 pg    MCHC 34.6 31.5 - 36.5 g/dL    RDW 17.7 (H) 10.0 - 15.0 %    Platelet Count 7 (LL) 150 - 450 10e9/L    Diff Method Automated Method     % Neutrophils 18.4 %    % Lymphocytes 77.9 %    % Monocytes 3.1 %    % Eosinophils 0.3 %    % Basophils 0.0 %    % Immature Granulocytes 0.3 %    Nucleated RBCs 1 (H) 0 /100    Absolute Neutrophil 0.5 (L) 1.6 - 8.3 10e9/L    Absolute Lymphocytes 2.3 0.8 - 5.3 10e9/L    Absolute Monocytes 0.1 0.0 - 1.3 10e9/L    Absolute Eosinophils 0.0 0.0 - 0.7 10e9/L    Absolute Basophils 0.0 0.0 - 0.2 10e9/L    Abs  Immature Granulocytes 0.0 0 - 0.4 10e9/L    Absolute Nucleated RBC 0.0    Basic metabolic panel   Result Value Ref Range    Sodium 142 133 - 144 mmol/L    Potassium 4.0 3.4 - 5.3 mmol/L    Chloride 108 94 - 109 mmol/L    Carbon Dioxide 25 20 - 32 mmol/L    Anion Gap 9 3 - 14 mmol/L    Glucose 116 (H) 70 - 99 mg/dL    Urea Nitrogen 33 (H) 7 - 30 mg/dL    Creatinine 1.11 (H) 0.52 - 1.04 mg/dL    GFR Estimate 48 (L) >60 mL/min/1.7m2    GFR Estimate If Black 58 (L) >60 mL/min/1.7m2    Calcium 8.5 8.5 - 10.1 mg/dL   Hepatic panel   Result Value Ref Range    Bilirubin Direct 1.3 (H) 0.0 - 0.2 mg/dL    Bilirubin Total 2.0 (H) 0.2 - 1.3 mg/dL    Albumin 2.6 (L) 3.4 - 5.0 g/dL    Protein Total 6.8 6.8 - 8.8 g/dL    Alkaline Phosphatase 104 40 - 150 U/L    ALT 74 (H) 0 - 50 U/L    AST 20 0 - 45 U/L   INR   Result Value Ref Range    INR 1.10 0.86 - 1.14   Partial thromboplastin time   Result Value Ref Range    PTT 23 22 - 37 sec   ABO/Rh type and screen   Result Value Ref Range    ABO A     RH(D)  Pos     Antibody Screen Neg     Test Valid Only At       [facility]    Specimen Expires 01/26/2017    Platelets prepare order unit   Result Value Ref Range    Ordered Component Type PLT Pheresis     Units Ordered 1    Blood component   Result Value Ref Range    Unit Number [id number]     Blood Component Type PlateletPheresis,LeukoRed Irrad (Part 2)     Division Number 00     Status of Unit Released to care unit 01/23/2017 1129     Blood Product Code K1527S01     Unit Status ISS    Platelets prepare order unit   Result Value Ref Range    Ordered Component Type PLT Pheresis     Units Ordered 1    Blood component   Result Value Ref Range    Unit Number [id number]     Blood Component Type PlateletPheresis,LeukoRed Irrad (Part 3)     Division Number 00     Status of Unit Released to care unit 01/23/2017 1446     Blood Product Code [id number]     Unit Status ISS          Assessments & Plan  (with Medical Decision Making)  71-year-old female with known aplastic anemia presents to ER with rectal bleeding bright red blood per rectum.  No abdominal pain vital signs stable patient evaluated in the ER hemoglobin is 9.1 but platelets are down to 7.  Patient was consented and transfused 2 units oflate lives here in the ER patient did have 1 other episode of bright red blood per rectum otherwise stable no abdominal pain no nausea vomiting patient given fluids IV EKG without ischemic changes other labs stable.  Patient to be admitted to hematology servicefor further management of thrombus of the pinnae are aplastic anemia withbright red blood per rectum ×2         I have reviewed the nursing notes.    I have reviewed the findings, diagnosis, plan and need for follow up with the patient.    Current Discharge Medication List          Final diagnoses:   BRBPR (bright red blood per rectum)   Idiopathic aplastic anemia (H)   Thrombocytopenia (H)     I, Holly Silverio, am serving as a trained medical scribe to document services personally performed by Pelon Barcenas MD, based on the provider's statements to me.   IPelon MD, was physically present and have reviewed and verified the accuracy of this note documented by Holly Silverio.     1/23/2017   [facility] EMERGENCY DEPARTMENT      This note was created at least in part by the use of dragon voice dictation system. Inadvertent typographical errors may still exist.  Pelon Barcenas MD.        Pelon Barcenas MD  01/23/17 4106

## 2017-01-23 NOTE — PROGRESS NOTES
Vascular Access Services Notes:    PICC coiled in the SVC. PICC pulled back 2 cm (now 3 cm external). Tip in SVC after chest x-ray.    Time spent with patient - 30 minutes        ADAM Pedro, RN [facility]

## 2017-01-23 NOTE — ED NOTES
"Arrived to ED d/t shortness of breath and \"internal bleeding\", hx of aplastic anemia, discharge a week ago from 7D, has had blood checked 3x week,  Hgb 7 yesterday, received 1 unit RBC, had a bowel movement last night with dark red blood that showed up on tissue paper, VSS upon arrival  "

## 2017-01-24 ENCOUNTER — APPOINTMENT (OUTPATIENT)
Dept: PHYSICAL THERAPY | Facility: CLINIC | Age: 72
DRG: 393 | End: 2017-01-24
Attending: PHYSICIAN ASSISTANT
Payer: COMMERCIAL

## 2017-01-24 LAB
ALBUMIN SERPL-MCNC: 2.3 G/DL (ref 3.4–5)
ALBUMIN SERPL-MCNC: 2.8 G/DL (ref 3.4–5)
ALP SERPL-CCNC: 104 U/L (ref 40–150)
ALP SERPL-CCNC: 85 U/L (ref 40–150)
ALT SERPL W P-5'-P-CCNC: 57 U/L (ref 0–50)
ALT SERPL W P-5'-P-CCNC: ABNORMAL U/L (ref 0–50)
ANION GAP SERPL CALCULATED.3IONS-SCNC: 11 MMOL/L (ref 3–14)
ANION GAP SERPL CALCULATED.3IONS-SCNC: 8 MMOL/L (ref 3–14)
AST SERPL W P-5'-P-CCNC: 12 U/L (ref 0–45)
AST SERPL W P-5'-P-CCNC: ABNORMAL U/L (ref 0–45)
BASOPHILS # BLD AUTO: 0 10E9/L (ref 0–0.2)
BASOPHILS # BLD AUTO: 0 10E9/L (ref 0–0.2)
BASOPHILS NFR BLD AUTO: 0 %
BASOPHILS NFR BLD AUTO: 0 %
BILIRUB DIRECT SERPL-MCNC: 0.4 MG/DL (ref 0–0.2)
BILIRUB DIRECT SERPL-MCNC: 1.1 MG/DL (ref 0–0.2)
BILIRUB SERPL-MCNC: 1.9 MG/DL (ref 0.2–1.3)
BILIRUB SERPL-MCNC: 2.3 MG/DL (ref 0.2–1.3)
BUN SERPL-MCNC: 20 MG/DL (ref 7–30)
BUN SERPL-MCNC: 21 MG/DL (ref 7–30)
CALCIUM SERPL-MCNC: 7.9 MG/DL (ref 8.5–10.1)
CALCIUM SERPL-MCNC: 8.2 MG/DL (ref 8.5–10.1)
CHLORIDE SERPL-SCNC: 107 MMOL/L (ref 94–109)
CHLORIDE SERPL-SCNC: 109 MMOL/L (ref 94–109)
CO2 SERPL-SCNC: 23 MMOL/L (ref 20–32)
CO2 SERPL-SCNC: 23 MMOL/L (ref 20–32)
CREAT SERPL-MCNC: 0.74 MG/DL (ref 0.52–1.04)
CREAT SERPL-MCNC: 0.84 MG/DL (ref 0.52–1.04)
DIFFERENTIAL METHOD BLD: ABNORMAL
DIFFERENTIAL METHOD BLD: ABNORMAL
EOSINOPHIL # BLD AUTO: 0 10E9/L (ref 0–0.7)
EOSINOPHIL # BLD AUTO: 0 10E9/L (ref 0–0.7)
EOSINOPHIL NFR BLD AUTO: 0.4 %
EOSINOPHIL NFR BLD AUTO: 0.6 %
ERYTHROCYTE [DISTWIDTH] IN BLOOD BY AUTOMATED COUNT: 18 % (ref 10–15)
ERYTHROCYTE [DISTWIDTH] IN BLOOD BY AUTOMATED COUNT: 18.2 % (ref 10–15)
ERYTHROCYTE [DISTWIDTH] IN BLOOD BY AUTOMATED COUNT: 18.4 % (ref 10–15)
GFR SERPL CREATININE-BSD FRML MDRD: 67 ML/MIN/1.7M2
GFR SERPL CREATININE-BSD FRML MDRD: 76 ML/MIN/1.7M2
GLUCOSE SERPL-MCNC: 103 MG/DL (ref 70–99)
GLUCOSE SERPL-MCNC: 88 MG/DL (ref 70–99)
HCT VFR BLD AUTO: 26.4 % (ref 35–47)
HCT VFR BLD AUTO: 26.6 % (ref 35–47)
HCT VFR BLD AUTO: 28.3 % (ref 35–47)
HGB BLD-MCNC: 9.1 G/DL (ref 11.7–15.7)
HGB BLD-MCNC: 9.2 G/DL (ref 11.7–15.7)
HGB BLD-MCNC: 9.6 G/DL (ref 11.7–15.7)
IMM GRANULOCYTES # BLD: 0 10E9/L (ref 0–0.4)
IMM GRANULOCYTES # BLD: 0 10E9/L (ref 0–0.4)
IMM GRANULOCYTES NFR BLD: 0.4 %
IMM GRANULOCYTES NFR BLD: 0.6 %
INTERPRETATION ECG - MUSE: NORMAL
LDH SERPL L TO P-CCNC: 273 U/L (ref 81–234)
LYMPHOCYTES # BLD AUTO: 1 10E9/L (ref 0.8–5.3)
LYMPHOCYTES # BLD AUTO: 2.3 10E9/L (ref 0.8–5.3)
LYMPHOCYTES NFR BLD AUTO: 64.5 %
LYMPHOCYTES NFR BLD AUTO: 82.3 %
MAGNESIUM SERPL-MCNC: 1.6 MG/DL (ref 1.6–2.3)
MCH RBC QN AUTO: 29.5 PG (ref 26.5–33)
MCH RBC QN AUTO: 29.9 PG (ref 26.5–33)
MCH RBC QN AUTO: 30 PG (ref 26.5–33)
MCHC RBC AUTO-ENTMCNC: 33.9 G/DL (ref 31.5–36.5)
MCHC RBC AUTO-ENTMCNC: 34.5 G/DL (ref 31.5–36.5)
MCHC RBC AUTO-ENTMCNC: 34.6 G/DL (ref 31.5–36.5)
MCV RBC AUTO: 86 FL (ref 78–100)
MCV RBC AUTO: 87 FL (ref 78–100)
MCV RBC AUTO: 87 FL (ref 78–100)
MONOCYTES # BLD AUTO: 0 10E9/L (ref 0–1.3)
MONOCYTES # BLD AUTO: 0.1 10E9/L (ref 0–1.3)
MONOCYTES NFR BLD AUTO: 2.2 %
MONOCYTES NFR BLD AUTO: 2.6 %
NEUTROPHILS # BLD AUTO: 0.4 10E9/L (ref 1.6–8.3)
NEUTROPHILS # BLD AUTO: 0.5 10E9/L (ref 1.6–8.3)
NEUTROPHILS NFR BLD AUTO: 14.7 %
NEUTROPHILS NFR BLD AUTO: 31.7 %
NRBC # BLD AUTO: 0 10*3/UL
NRBC # BLD AUTO: 0 10*3/UL
NRBC BLD AUTO-RTO: 0 /100
NRBC BLD AUTO-RTO: 1 /100
PHOSPHATE SERPL-MCNC: 3.3 MG/DL (ref 2.5–4.5)
PLATELET # BLD AUTO: 34 10E9/L (ref 150–450)
PLATELET # BLD AUTO: 36 10E9/L (ref 150–450)
PLATELET # BLD AUTO: 41 10E9/L (ref 150–450)
PLATELET # BLD EST: ABNORMAL 10*3/UL
POTASSIUM SERPL-SCNC: 3.8 MMOL/L (ref 3.4–5.3)
POTASSIUM SERPL-SCNC: 7.3 MMOL/L (ref 3.4–5.3)
PROT SERPL-MCNC: 6.2 G/DL (ref 6.8–8.8)
PROT SERPL-MCNC: 6.8 G/DL (ref 6.8–8.8)
RBC # BLD AUTO: 3.03 10E12/L (ref 3.8–5.2)
RBC # BLD AUTO: 3.08 10E12/L (ref 3.8–5.2)
RBC # BLD AUTO: 3.25 10E12/L (ref 3.8–5.2)
RETICS # AUTO: 15.3 10E9/L (ref 25–95)
RETICS/RBC NFR AUTO: 0.5 % (ref 0.5–2)
SODIUM SERPL-SCNC: 140 MMOL/L (ref 133–144)
SODIUM SERPL-SCNC: 141 MMOL/L (ref 133–144)
WBC # BLD AUTO: 1.3 10E9/L (ref 4–11)
WBC # BLD AUTO: 1.6 10E9/L (ref 4–11)
WBC # BLD AUTO: 2.8 10E9/L (ref 4–11)

## 2017-01-24 PROCEDURE — 25000132 ZZH RX MED GY IP 250 OP 250 PS 637: Performed by: PHYSICIAN ASSISTANT

## 2017-01-24 PROCEDURE — 12000008 ZZH R&B INTERMEDIATE UMMC

## 2017-01-24 PROCEDURE — 25000131 ZZH RX MED GY IP 250 OP 636 PS 637: Performed by: INTERNAL MEDICINE

## 2017-01-24 PROCEDURE — 84100 ASSAY OF PHOSPHORUS: CPT | Performed by: PHYSICIAN ASSISTANT

## 2017-01-24 PROCEDURE — 97116 GAIT TRAINING THERAPY: CPT | Mod: GP | Performed by: PHYSICAL THERAPIST

## 2017-01-24 PROCEDURE — 80053 COMPREHEN METABOLIC PANEL: CPT | Performed by: PHYSICIAN ASSISTANT

## 2017-01-24 PROCEDURE — 85027 COMPLETE CBC AUTOMATED: CPT | Performed by: PHYSICIAN ASSISTANT

## 2017-01-24 PROCEDURE — 97530 THERAPEUTIC ACTIVITIES: CPT | Mod: GP | Performed by: PHYSICAL THERAPIST

## 2017-01-24 PROCEDURE — 83615 LACTATE (LD) (LDH) ENZYME: CPT | Performed by: PHYSICIAN ASSISTANT

## 2017-01-24 PROCEDURE — 25000125 ZZHC RX 250: Performed by: PHYSICIAN ASSISTANT

## 2017-01-24 PROCEDURE — 25000308 HC RX OP HPI UCR WEL MED 250 IP 250: Performed by: PHYSICIAN ASSISTANT

## 2017-01-24 PROCEDURE — 82248 BILIRUBIN DIRECT: CPT | Performed by: PHYSICIAN ASSISTANT

## 2017-01-24 PROCEDURE — 83735 ASSAY OF MAGNESIUM: CPT | Performed by: PHYSICIAN ASSISTANT

## 2017-01-24 PROCEDURE — 94642 AEROSOL INHALATION TREATMENT: CPT

## 2017-01-24 PROCEDURE — 80076 HEPATIC FUNCTION PANEL: CPT | Performed by: PHYSICIAN ASSISTANT

## 2017-01-24 PROCEDURE — 85025 COMPLETE CBC W/AUTO DIFF WBC: CPT | Performed by: PHYSICIAN ASSISTANT

## 2017-01-24 PROCEDURE — 94640 AIRWAY INHALATION TREATMENT: CPT

## 2017-01-24 PROCEDURE — 40000193 ZZH STATISTIC PT WARD VISIT: Performed by: PHYSICAL THERAPIST

## 2017-01-24 PROCEDURE — 40000611 ZZHCL STATISTIC MORPHOLOGY W/INTERP HEMEPATH TC 85060: Performed by: PHYSICIAN ASSISTANT

## 2017-01-24 PROCEDURE — 85045 AUTOMATED RETICULOCYTE COUNT: CPT | Performed by: PHYSICIAN ASSISTANT

## 2017-01-24 PROCEDURE — 40000275 ZZH STATISTIC RCP TIME EA 10 MIN

## 2017-01-24 PROCEDURE — 25000131 ZZH RX MED GY IP 250 OP 636 PS 637: Performed by: PHYSICIAN ASSISTANT

## 2017-01-24 PROCEDURE — 25000132 ZZH RX MED GY IP 250 OP 250 PS 637: Performed by: INTERNAL MEDICINE

## 2017-01-24 PROCEDURE — 80048 BASIC METABOLIC PNL TOTAL CA: CPT | Performed by: PHYSICIAN ASSISTANT

## 2017-01-24 PROCEDURE — 97161 PT EVAL LOW COMPLEX 20 MIN: CPT | Mod: GP | Performed by: PHYSICAL THERAPIST

## 2017-01-24 PROCEDURE — 40000802 ZZH SITE CHECK

## 2017-01-24 RX ORDER — POLYETHYLENE GLYCOL 3350 17 G/17G
17 POWDER, FOR SOLUTION ORAL DAILY
Status: DISCONTINUED | OUTPATIENT
Start: 2017-01-24 | End: 2017-01-25 | Stop reason: HOSPADM

## 2017-01-24 RX ORDER — PENTAMIDINE ISETHIONATE 300 MG/300MG
300 INHALANT RESPIRATORY (INHALATION) ONCE
Status: COMPLETED | OUTPATIENT
Start: 2017-01-24 | End: 2017-01-24

## 2017-01-24 RX ORDER — AMOXICILLIN 250 MG
1-2 CAPSULE ORAL 2 TIMES DAILY
Status: DISCONTINUED | OUTPATIENT
Start: 2017-01-24 | End: 2017-01-25 | Stop reason: HOSPADM

## 2017-01-24 RX ORDER — CYCLOSPORINE 100 MG/1
200 CAPSULE, LIQUID FILLED ORAL 2 TIMES DAILY
Status: DISCONTINUED | OUTPATIENT
Start: 2017-01-24 | End: 2017-01-25 | Stop reason: HOSPADM

## 2017-01-24 RX ORDER — ALBUTEROL SULFATE 0.83 MG/ML
2.5 SOLUTION RESPIRATORY (INHALATION) ONCE
Status: COMPLETED | OUTPATIENT
Start: 2017-01-24 | End: 2017-01-24

## 2017-01-24 RX ADMIN — LEVOFLOXACIN 500 MG: 500 TABLET, FILM COATED ORAL at 19:53

## 2017-01-24 RX ADMIN — CYCLOSPORINE 200 MG: 100 CAPSULE, LIQUID FILLED ORAL at 19:53

## 2017-01-24 RX ADMIN — ALBUTEROL SULFATE 2.5 MG: 2.5 SOLUTION RESPIRATORY (INHALATION) at 14:18

## 2017-01-24 RX ADMIN — ACETAMINOPHEN 500 MG: 500 TABLET, FILM COATED ORAL at 04:03

## 2017-01-24 RX ADMIN — ACETAMINOPHEN 500 MG: 500 TABLET, FILM COATED ORAL at 21:45

## 2017-01-24 RX ADMIN — DICLOFENAC SODIUM 2 G: 10 GEL TOPICAL at 08:22

## 2017-01-24 RX ADMIN — CYCLOSPORINE 250 MG: 100 CAPSULE, LIQUID FILLED ORAL at 08:21

## 2017-01-24 RX ADMIN — FLUCONAZOLE 200 MG: 200 TABLET ORAL at 19:53

## 2017-01-24 RX ADMIN — DICLOFENAC SODIUM 2 G: 10 GEL TOPICAL at 19:53

## 2017-01-24 RX ADMIN — PANTOPRAZOLE SODIUM 40 MG: 40 TABLET, DELAYED RELEASE ORAL at 08:21

## 2017-01-24 RX ADMIN — ACETAMINOPHEN 500 MG: 500 TABLET, FILM COATED ORAL at 10:03

## 2017-01-24 RX ADMIN — PREDNISONE 60 MG: 50 TABLET ORAL at 08:21

## 2017-01-24 RX ADMIN — PENTAMIDINE ISETHIONATE 300 MG: 300 INHALANT RESPIRATORY (INHALATION) at 14:18

## 2017-01-24 ASSESSMENT — PAIN DESCRIPTION - DESCRIPTORS: DESCRIPTORS: ACHING

## 2017-01-24 NOTE — PROGRESS NOTES
[facility] [place]    Hematology/Oncology Progress Note    Date of Service (when I saw the patient): 01/24/2017     Assessment and Plan  Bonny Raymundo is a 71 year old female with PMH significant for h/o DVT/PE, HTN during rent admission, and idiopathic aplastic anemia (s/p ATG with ongoing cyclosporine, Prednisone and Eltrombopag) who presents with bright red blood per rectum.     GI:   # Lower GI bleed/BRBPR. Possible relation to hemorrhoids, exacerbated by severe TCP. Improving per pt report. As per H&P, pt had stool with passage of dark red clot overnight (1/22-1/23) after approx 3-4 days of no BM. States he did push with this stool. Note that this was after pt presented to clinic for count check on 1/22 and received 1U PRBCs for Hgb 7.8. After bleeding noted overnight, pt presented to ED on 1/23 AM and at that time Hgb was 9.1 and Plt count 7K. Coags WNL. She had 2 soft/formed stools in the ED (starting approx 10 hours after her overnight stool), both with red blood per rectum but no passage of clots per her report. Does not have h/o hemorrhoids, nor rectal pain. Small, non-tender and non-bleeding external hemorrhoid appreciated on 1/23 external exam. Pt endorsed some mild abdominal discomfort, cramping and intermittent nausea, but no rosie abdominal pain. No vomiting.   - since admission, she had a smear of stool with red blood noted on toilet paper (1/23 evening) and no stool overnight (1/23-1/24). This AM (1/24), she reports soft stool with only pink tinged coloring on toilet paper. Her Hgb did downtrend from 9.1--> 7.9, improved to 9.1 again this AM after 1U PRBCs overnight. Plts did bump after 2U Plt in ED and they are 41K this AM.  Favor count trend relation to AA and therapy (vs. acute blood loss anemia).   - GI consulted, awaiting further recs  - continue to trend CBC but adjust from q8hr to q12hr today  - transfuse to maintain Hgb >8 and Plt >30K (given difficulty in  increasing her Plts above this in recent past)  - IVF support  - diet as tolerated  - dose reduce cyclosporine as below (which could be source of her generalized abdominal discomfort/nausea). No e/o UGI bleed at this time. She continues daily Protonix while on steroids.     # Hyperbilirubinemia. Unclear etiology. Tbili 2.0 (direct 1.3) on admission. This is increased from total bili 0.3 on 1/9. No RUQ pain, other LFTs WNL. Today, Tbili is 2.3 (direct 1.1) with very mild elevation in ALT. Abdominal exam benign.   - LDH slightly elevated, but unclear in context of underlying malignancy and recent therapy  - will add on peripheral smear before considering further hemolysis workup  - trend with hepatic panel in AM.    HEME:  # Aplastic anemia. Initiated treatment with ATG, cyclosporine, steroid (MP followed by Pred) and eltrombopag; D1=1/9). She was discharged to continue Cyclosporine 250mg BID and Prednisone 60mg daily. She resumed eltrombopag on 1/17 (after drug had to be delivered to her house).    - continue cyclosporine, however will reduce dose to 200mg BID. D/w Dr. Joseph: there is not a drug level to aim for, just don't want the level too low (hers was higher at 384 on 1/20 check) and want to ensure tolerance.   - continue Prednisone 60mg daily. Per recent clinic note, continue through 1/27, then taper.    - continue eltrombopag 50mg daily    # Pancytopenia. 2/2 aplastic anemia and recent therapy.    - CBC monitoring and transfusion parameters as above    ID:  # PPX.  - continue PTA fluconazole 200 mg daily, levaquin 500 mg daily   - will add monthly inh Pentamidine today for PCP ppx    CV/PV:  # Hypertension. Noted during recent admission, thought possible 2/2 drugs on treatment regime (?cyclosporine, Pred). Was managed with Norvasc, on which pt was discharged. BPs generally 110s-130s/60s-70s overnight. Normotensive despite holding Norvasc this AM.   - hold Norvasc at this time. Monitor BPs with lower dose of  "cyclosporine to see if antihypertensive can be discontinued.     # Hx DVT/PE. Pt had 2 episodes of provoked LLE DVTs in 2012 and 2014 (travel related) with associated PE in 2012. Treated in the past with warfarin and lovenox. Transitioned to ASA which was stopped in the setting of thrombocytopenia. On no anticoagulation or antiplatelet agent at this time.     Renal:   # ELSA. IMPROVED. Appears baseline Cr ranges 0.7-0.9 with GFR >60. On presentation to ED, Cr 1.1 (GFR 48). Likely 2/2 hypovolemia. Of note, pt had reported pink tinged urine at recent clinic visit. Had UA done (1/19) which was positive for moderate blood and 4 WBCs, no reflex to culture. Pt denies any urinary sxs at this time.    - S/p 1L bolus in ED.   - continue fluids but will further rate reduce to 50 cc/hr  - d/c UA/UCx (not collected last night) as no current urinary sxs and ELSA improved    MSK:    # Low back pain. Improved with use of Tylenol and topical cream (diclofenac). Has not used oxycodone.     FEN:  - IVFs as above  - monitor lytes, replace per unit protocol  - nutrition consult given report of decreased appetite/PO intake    Prophylaxis/Misc:    # VTE Prophylaxis contraindicated due to thrombocytopenia  # GI. Continue PPI for GI ppx with steroids  # PT consulted.     Dispo: Pending at this time. If rectal bleed remains improved and Hgb stable (within expected parameters on current treatment and able to manage effectively with transfusion support), consider d/c to home tomorrow. Pt has close f/u clinic visit on 1/26. However, will also await GI recs to further determine plan.     Lana Chaney PA-C  Heme/Onc  [phone] (pager)    Interval History  No acute events overnight, afebrile. Doing well this morning. Finishing breakfast and taking pills. Reports soft BM this AM without blood in toilet and only pink \"tinge\" color on toilet paper. No new abdominal pain apart from her generalized discomfort. No c/o nausea this AM.     Physical " Exam  Temp: 97.9  F (36.6  C) Temp src: Oral BP: 120/78 mmHg Pulse: 85 Heart Rate: 76 Resp: 18 SpO2: 96 % O2 Device: None (Room air)    Filed Vitals:    01/23/17 0929 01/23/17 1652 01/24/17 0900   Weight: 61.236 kg (135 lb) 60.464 kg (133 lb 4.8 oz) 60.283 kg (132 lb 14.4 oz)     Vital Signs with Ranges  Temp:  [97.2  F (36.2  C)-98.4  F (36.9  C)] 97.9  F (36.6  C)  Pulse:  [] 85  Heart Rate:  [] 76  Resp:  [12-26] 18  BP: (116-152)/(66-85) 120/78 mmHg  SpO2:  [90 %-99 %] 96 %  I/O last 3 completed shifts:  In: 1823.75 [P.O.:250; I.V.:973.75]  Out: 1450 [Urine:1450]  Constitutional: Pt seen sitting up in bed, finished breakfast and getting ready to take pills. NAD.   Eyes: Lids and lashes normal. Sclera clear, conjunctiva normal.  ENT: NC/AT. OP pink and moist, no lesions or thrush.    Respiratory: Able to speak in full sentences. No increased work of breathing, good air exchange, on RA. CTA b/l   Cardiovascular: RRR  GI: Normal BS. Abd is soft, non-distended, non-tender.  : Small non-tender, non-bleeding external hemorrhoid seen on 1/23 exam. Deferred this AM, as GI team performed exam.   Skin: Pale but clean, dry, intact.    Musculoskeletal: No appreciable b/l LE edema.  Neurologic: Awake, alert, oriented.  Cranial nerves II-XII are grossly intact. Gait normal.    Neuropsychiatric: Calm, normal eye contact, affect congruent, memory for past and recent events intact and thought process normal.  VAD: PICC line in LUE, c/d/i    Medications    - MEDICATION INSTRUCTIONS -       NaCl 75 mL/hr at 01/23/17 0921       cycloSPORINE modified  200 mg Oral BID     albuterol  2.5 mg Nebulization Once    Followed by     pentamidine  300 mg Inhalation Once     sodium chloride (PF)  3 mL Intracatheter Q8H     diclofenac  2 g Topical BID     eltrombopag  50 mg Oral QPM     fluconazole  200 mg Oral QPM     levofloxacin  500 mg Oral QPM     pantoprazole  40 mg Oral QAM     predniSONE  1 mg/kg Oral Daily     sodium  chloride (PF)  10 mL Intracatheter Q7 Days       Data  Results for orders placed or performed during the hospital encounter of 01/23/17 (from the past 24 hour(s))   XR Chest Port 1 View   Result Value Ref Range    Radiologist flags Left PICC malposition (Urgent)     Narrative    Exam:  XR CHEST PORT 1 VW, 1/23/2017 1:51 PM    History: PICC placement    Comparison:  Chest radiograph from 10:37 AM on 1/23/2017.    Findings:  And glenohumeral AP view of the chest is obtained. Interval  traction of the right-sided PICC with the tip now projecting over the  proximal azygos vein. Cardiomediastinal silhouette is within normal  limits. There is no pleural effusion or pneumothorax. No focal  airspace opacity. The pulmonary vasculature is indistinct. The  visualized upper abdomen is unremarkable.      Impression    Impression:    1. Interval adjustment of the left sided PICC with the tip likely in  the proximal azygos vein.  2. No acute cardiopulmonary abnormality.    [Urgent Result: Left PICC malposition]    Finding was identified on 1/23/2017 1:51 PM.     Dr. Barcenas was contacted by Dr. Pratt at 1/23/2017 2:36 PM and  verbalized understanding of the urgent finding.     I have personally reviewed the examination and initial interpretation  and I agree with the findings.    ISMAEL KHOURY MD   Platelets prepare order unit   Result Value Ref Range    Ordered Component Type PLT Pheresis     Units Ordered 1    Blood component   Result Value Ref Range    Unit Number [id number]     Blood Component Type PlateletPheresis,LeukoRed Irrad (Part 3)     Division Number 00     Status of Unit Released to care unit     Blood Product Code [id number]     Unit Status ISS    CBC with platelets   Result Value Ref Range    WBC 1.3 (L) 4.0 - 11.0 10e9/L    RBC Count 2.70 (L) 3.8 - 5.2 10e12/L    Hemoglobin 8.0 (L) 11.7 - 15.7 g/dL    Hematocrit 24.4 (L) 35.0 - 47.0 %    MCV 90 78 - 100 fl    MCH 29.6 26.5 - 33.0 pg    MCHC 32.8 31.5 - 36.5  g/dL    RDW 17.8 (H) 10.0 - 15.0 %    Platelet Count 57 (L) 150 - 450 10e9/L   CBC with platelets   Result Value Ref Range    WBC 1.3 (L) 4.0 - 11.0 10e9/L    RBC Count 2.66 (L) 3.8 - 5.2 10e12/L    Hemoglobin 7.9 (L) 11.7 - 15.7 g/dL    Hematocrit 23.9 (L) 35.0 - 47.0 %    MCV 90 78 - 100 fl    MCH 29.7 26.5 - 33.0 pg    MCHC 33.1 31.5 - 36.5 g/dL    RDW 17.7 (H) 10.0 - 15.0 %    Platelet Count 56 (L) 150 - 450 10e9/L   CBC with platelets differential   Result Value Ref Range    WBC 2.8 (L) 4.0 - 11.0 10e9/L    RBC Count 3.03 (L) 3.8 - 5.2 10e12/L    Hemoglobin 9.1 (L) 11.7 - 15.7 g/dL    Hematocrit 26.4 (L) 35.0 - 47.0 %    MCV 87 78 - 100 fl    MCH 30.0 26.5 - 33.0 pg    MCHC 34.5 31.5 - 36.5 g/dL    RDW 18.2 (H) 10.0 - 15.0 %    Platelet Count 41 (LL) 150 - 450 10e9/L    Diff Method Automated Method     % Neutrophils 14.7 %    % Lymphocytes 82.3 %    % Monocytes 2.2 %    % Eosinophils 0.4 %    % Basophils 0.0 %    % Immature Granulocytes 0.4 %    Nucleated RBCs 1 (H) 0 /100    Absolute Neutrophil 0.4 (LL) 1.6 - 8.3 10e9/L    Absolute Lymphocytes 2.3 0.8 - 5.3 10e9/L    Absolute Monocytes 0.1 0.0 - 1.3 10e9/L    Absolute Eosinophils 0.0 0.0 - 0.7 10e9/L    Absolute Basophils 0.0 0.0 - 0.2 10e9/L    Abs Immature Granulocytes 0.0 0 - 0.4 10e9/L    Absolute Nucleated RBC 0.0     Platelet Estimate Confirming automated cell count    Comprehensive metabolic panel   Result Value Ref Range    Sodium 140 133 - 144 mmol/L    Potassium 7.3 (HH) 3.4 - 5.3 mmol/L    Chloride 109 94 - 109 mmol/L    Carbon Dioxide 23 20 - 32 mmol/L    Anion Gap 8 3 - 14 mmol/L    Glucose 88 70 - 99 mg/dL    Urea Nitrogen 21 7 - 30 mg/dL    Creatinine 0.74 0.52 - 1.04 mg/dL    GFR Estimate 76 >60 mL/min/1.7m2    GFR Estimate If Black >90   GFR Calc   >60 mL/min/1.7m2    Calcium 7.9 (L) 8.5 - 10.1 mg/dL    Bilirubin Total 1.9 (H) 0.2 - 1.3 mg/dL    Albumin 2.3 (L) 3.4 - 5.0 g/dL    Protein Total 6.2 (L) 6.8 - 8.8 g/dL    Alkaline  Phosphatase 85 40 - 150 U/L    ALT  0 - 50 U/L     Canceled, Test credited   Unsatisfactory specimen - hemolyzed      AST  0 - 45 U/L     Canceled, Test credited   Unsatisfactory specimen - hemolyzed     Magnesium   Result Value Ref Range    Magnesium 1.6 1.6 - 2.3 mg/dL   Phosphorus   Result Value Ref Range    Phosphorus 3.3 2.5 - 4.5 mg/dL   Bilirubin direct   Result Value Ref Range    Bilirubin Direct 0.4 (H) 0.0 - 0.2 mg/dL   Basic metabolic panel   Result Value Ref Range    Sodium 141 133 - 144 mmol/L    Potassium 3.8 3.4 - 5.3 mmol/L    Chloride 107 94 - 109 mmol/L    Carbon Dioxide 23 20 - 32 mmol/L    Anion Gap 11 3 - 14 mmol/L    Glucose 103 (H) 70 - 99 mg/dL    Urea Nitrogen 20 7 - 30 mg/dL    Creatinine 0.84 0.52 - 1.04 mg/dL    GFR Estimate 67 >60 mL/min/1.7m2    GFR Estimate If Black 81 >60 mL/min/1.7m2    Calcium 8.2 (L) 8.5 - 10.1 mg/dL   Hepatic panel   Result Value Ref Range    Bilirubin Direct 1.1 (H) 0.0 - 0.2 mg/dL    Bilirubin Total 2.3 (H) 0.2 - 1.3 mg/dL    Albumin 2.8 (L) 3.4 - 5.0 g/dL    Protein Total 6.8 6.8 - 8.8 g/dL    Alkaline Phosphatase 104 40 - 150 U/L    ALT 57 (H) 0 - 50 U/L    AST 12 0 - 45 U/L   Lactate Dehydrogenase   Result Value Ref Range    Lactate Dehydrogenase 273 (H) 81 - 234 U/L

## 2017-01-24 NOTE — PLAN OF CARE
Problem: Individualization  Goal: Patient Preferences  Outcome: Improving  hgb 9.1 today. Pt reports she had smear of blood in toilet tissues after bm this am.good pain control. Afeb.vss.no picc teaching needed today since pt will go to clinic 3xs/week.fair po intake without n/v.

## 2017-01-24 NOTE — CONSULTS
Gastroenterology Consultation    Bonny Raymundo [id number] 71 year old 1945  Date of Admission: 1/23/2017  Requesting physician: Laisha Joseph MD         Reason for consult:   lower GI bleed/BRBPR; d/w cristopher MARES on 1/23 evening when pt was in ED. Pt has since been admitted to Unit 7D (Heme/Onc service)         HPI:   Bonny Raymundo is a 71 year old female with idiopathic aplastic anemia (s/p ATG w/ ongoing tx cyclosporine, prednisone & eltrombopag), hx provoked DVT/PE, and HTN who presents with bright red blood per rectum.  Patient had a formed stool in the evening of 1/22-1/223 after no BM for 3-4 days. She passed a dark red clot after the bowel movement. There was blood in the toilet and tissue paper. Of note, pt had received 1u pRBC on 1/22 at clinic appointment for Hgb of 7.8 and platelet count was 10. Patient notes that this has never happened before. She denies history of hemorrhoids. She does not feel that she was straining more than usually but had not had a bowel movement in some days. She did not have any significant pain when passing the blood clot. She notes some generalized abdominal discomfort that has been going on for some time that feels like she has done about 25 crunches. Notes that her appetite hasn't been good with all the current treatments. She denies worsening abdominal pain with eating.  She denies any recent fevers, chills, diarrhea, vomiting, hematemesis, dysuria. She denies any recent use of NSAIDs or alcohol.    Patient presented to ED after passing blood and found to have Hgb 9.1 and platelet count of 7K. She had 2 soft but formed stools in the ED each with subsequently less bright blood. Her vitals were stable other than heart rate around 100-105. Patient received 1u pRBC and 2u platelets. Since admission patient has had some light smearing of blood on a pad that she is wearing but her stools have looked normal and no further blood clots.         Past Medical History:      Past Medical History   Diagnosis Date     Osteoporosis, unspecified      Allergic rhinitis due to other allergen      Headache(784.0)      Sensorineural hearing loss, unspecified      Closed anterior dislocation of humerus      Sprain of ankle, unspecified site      L     DVT of lower extremity (deep venous thrombosis) (H) 10-12     Left after prolonged sitting-plane     Pulmonary emboli (H) 10-12     DVT, recurrent, lower extremity, acute (H)           Past Surgical History:     Past Surgical History   Procedure Laterality Date     C nonspecific procedure            C nonspecific procedure       hysterectomy/BSO     C nonspecific procedure       myomectomy (fibroids)     Hc colonoscopy thru stoma, diagnostic       normal- minimal diverticulosis     C dexa interpretation, axial  03     Arthrodesis foot  11     Hallux valgus R-1st MP joint     Cataract iol, rt/lt Right      Cataract iol, rt/lt Left      Blepharoplasty bilateral  ,      Exchange intraocular lens implant Right 2015     Procedure: EXCHANGE INTRAOCULAR LENS IMPLANT;  Surgeon: Garrett Dawson MD;  Location:  [facility]     Vitrectomy parsplana with 23 gauge system Right 2015     Procedure: VITRECTOMY PARSPLANA WITH 23 GAUGE SYSTEM;  Surgeon: Racheal Loyd MD;  Location:  [facility]     Bone marrow biopsy, bone specimen, needle/trocar N/A 2016     Procedure: BIOPSY BONE MARROW;  Surgeon: Mu Morgan MD;  Location:  [facility]     Bone marrow biopsy, bone specimen, needle/trocar N/A 2016     Procedure: BIOPSY BONE MARROW;  Surgeon: Mu Morgan MD;  Location:  GI     Picc insertion Left 2017     5fr DL BioFlo PICC, 42cm (2cm external) in the L basilic vein w/ tip in the  SVC RA junction.          Medications:     Prescriptions prior to admission   Medication Sig Dispense Refill Last Dose     cycloSPORINE modified (GENERIC EQUIVALENT) 100 MG capsule Take 200 mg  by mouth 2 times daily along with two 25 mg capsules for a total of 250 mg twice daily   1/23/2017 at AM     oxyCODONE (ROXICODONE) 5 MG IR tablet Take 1 tablet (5 mg) by mouth every 6 hours as needed for moderate to severe pain 50 tablet 0 1/22/2017 at 1 dose     senna-docusate (SENNA S) 8.6-50 MG per tablet Take 1-2 tablets by mouth 2 times daily as needed for constipation 100 tablet 1 1/23/2017 at am     cycloSPORINE modified (GENERIC EQUIVALENT) 25 MG capsule Take 2 capsules (50 mg) by mouth 2 times daily Along with two 100 mg capsules 120 capsule 0 1/23/2017 at am     amLODIPine (NORVASC) 5 MG tablet Take 1 tablet (5 mg) by mouth daily 30 tablet 0 1/23/2017 at am     predniSONE (DELTASONE) 20 MG tablet Take 3 tablets (60 mg) by mouth daily 42 tablet 0 1/23/2017 at am     pantoprazole (PROTONIX) 40 MG EC tablet Take 1 tablet (40 mg) by mouth every morning 30 tablet 1 1/23/2017 at am     eltrombopag (PROMACTA) 50 MG tablet Take 1 tablet (50 mg) by mouth daily Administer on an empty stomach, 1 hour before or 2 hours after a meal. 30 tablet 1 1/22/2017 at pm     fluconazole (DIFLUCAN) 200 MG tablet Take 1 tablet (200 mg) by mouth daily 30 tablet 3 1/22/2017 at pm     diclofenac (VOLTAREN) 1 % GEL topical gel Apply 2 g topically 2 times daily 100 g 1 1/23/2017 at Unknown time     levofloxacin (LEVAQUIN) 500 MG tablet Take 1 tablet (500 mg) by mouth daily 30 tablet 3 1/22/2017 at pm     Acetaminophen (TYLENOL PO) Take 325 mg by mouth every 6 hours as needed for mild pain or fever   1/23/2017 at am      loratadine (CLARITIN) 10 MG tablet Take 10 mg by mouth daily as needed. For head cold    Past Month at Unknown time     chlorpheniramine (CHLOR-TRIMETON) 4 MG tablet Take 4 mg by mouth every 6 hours as needed. For head cold    Past Month at Unknown time     COMPRESSION STOCKINGS Wear compression stockings at 20-30 mmHg rating most time during the day to the affected leg (left leg) or both legs. Take them off at night.  "2 each 2 Taking     ORDER FOR DME Equipment being ordered: knee high compression stockings- 18-20 mm 1 Box 1 Taking     DiphenhydrAMINE HCl (BENADRYL ALLERGY PO) Take 25 mg by mouth At Bedtime    1/9/2017     calcium-vitamin D (CALTRATE) 600-400 MG-UNIT per tablet Take 1 tablet by mouth daily.     1/9/2017     MULTIVITAMIN TABS   OR 1 tablet daily (Patient taking differently: 1 tablet by mouth daily)   1/9/2017          Allergies:     Allergies   Allergen Reactions     Cats      Dogs      No Known Drug Allergies      Seasonal Allergies           Social History:     Social History     Social History     Marital Status:      Spouse Name: N/A     Number of Children: 1     Years of Education: N/A     Occupational History     purchasing [employer]      Social History Main Topics     Smoking status: Former Smoker     Quit date: 05/14/1973     Smokeless tobacco: Never Used     Alcohol Use: No      Comment: occasionally     Drug Use: No     Sexual Activity:     Partners: Male     Other Topics Concern     Not on file     Social History Narrative          Family History:     Family History   Problem Relation Age of Onset     Musculoskeletal Disorder Mother      MS     HEART DISEASE Father      HEART DISEASE Brother      afib     Family history reviewed         Review of Systems:   A complete 10 point review of systems was obtained.  Please see the HPI for pertinent positives and negatives.  All other systems were reviewed and were found to be negative.          Physical Exam:   VS:  /72 mmHg  Pulse 85  Temp(Src) 97.2  F (36.2  C) (Oral)  Resp 18  Ht 1.6 m (5' 3\")  Wt 60.464 kg (133 lb 4.8 oz)  BMI 23.62 kg/m2  SpO2 95%    General:  A&Ox3, NAD, appears stated age, conversational and cooperative  HEENT:  NC/AT, no scleral icterus  CV:  RRR, no murmurs, gallops or rubs  Pulm: CTA B/L, no crackles, wheezes or rhonchi  Abd: Normoactive bowel sounds, soft, nontender, non-distended  Rectal: Skin tag present at " 1 o'clock position, no masses appreciated on exam, trace dark red blood on exam glove.  Skin: No jaundice  Ext: WWPx4, no LE edema  Neuro:  CN II-XII grossly intact         Data:     BMP  Recent Labs  Lab 01/24/17  0647 01/23/17  0944 01/17/17  1246    142 140   POTASSIUM 7.3* 4.0 3.8   CHLORIDE 109 108 107   LEO 7.9* 8.5 8.8   CO2 23 25 25   BUN 21 33* 35*   CR 0.74 1.11* 0.80   GLC 88 116* 132*     CBC  Recent Labs  Lab 01/24/17  0647 01/23/17  2137 01/23/17  1829 01/23/17  0944   WBC 2.8* 1.3* 1.3* 2.9*   RBC 3.03* 2.66* 2.70* 2.98*   HGB 9.1* 7.9* 8.0* 9.1*   HCT 26.4* 23.9* 24.4* 26.3*   MCV 87 90 90 88   MCH 30.0 29.7 29.6 30.5   MCHC 34.5 33.1 32.8 34.6   RDW 18.2* 17.7* 17.8* 17.7*   PLT 41* 56* 57* 7*     INR  Recent Labs  Lab 01/23/17  0944   INR 1.10     LFTs  Recent Labs  Lab 01/24/17  0647 01/23/17  0944   ALKPHOS 85 104   AST Canceled, Test credited Unsatisfactory specimen - hemolyzed 20   ALT Canceled, Test credited Unsatisfactory specimen - hemolyzed 74*   BILITOTAL 1.9* 2.0*   PROTTOTAL 6.2* 6.8   ALBUMIN 2.3* 2.6*      PANCNo lab results found in last 7 days.         Assessment/Recommendations:   Bonny Raymundo is a 71 year old female with a PMH significant for idiopathic aplastic anemia (s/p ATG w/ ongoing tx cyclosporine, prednisone & eltrombopag), hx DVT/PE, and HTN who presents with bright red blood per rectum.  Differential for LGIB is broad but given patient presentation, exam findings, hemodynamic stability and stable hemoglobin this is most likely hemorrhoidal bleed in setting of severe thrombocytopenia. Differential includes diverticular bleed but unlikely because wasn't a hemodynamically significant amount of blood, AVM/angiodysplasia which also has self-limiting coarse like this, and also infectious colitis given her neutropenia but this is unlikely in the setting of pt being afebrile and w/o diarrhea prior to onset.     Recommendations:  - Adding fiber with Miralax/metamucil daily  in addition to dietary sources  - Can use preparation H topically and sitz baths for irritation and pruritis. Can consider Anusol suppositories if the others do not provide sufficient relief.  - Would recommend outpatient colonoscopy once platelet count is more steadily above 50,000.    Patient has been seen and discussed with Dr. Dawson.    Guerline Youngblood  PGY2   [phone]

## 2017-01-24 NOTE — PLAN OF CARE
Problem: Goal Outcome Summary  Goal: Goal Outcome Summary  Outcome: No Change  A&O. VSS. Afebrile. NS @ 75mL/hr. Independent. Chronic back pain. Tylenol x 1 for pain. No bloody stools this shift. Will continue w/POC.

## 2017-01-24 NOTE — PROGRESS NOTES
"CLINICAL NUTRITION SERVICES - ASSESSMENT NOTE     Nutrition Prescription    RECOMMENDATIONS FOR MDs/PROVIDERS TO ORDER:  - none additional     Malnutrition Status:    - unable to complete all parameters of NFPA     Recommendations already ordered by Registered Dietitian (RD):  - ordered Ensure x 1 and Magic Cup x 2    Future/Additional Recommendations:  - PO/suppl intake and adequacy      REASON FOR ASSESSMENT  Bonny Raymundo is a/an 71 year old female assessed by the dietitian for Provider Order - decreased intake and appetite    Medical History:Pt  with a history of idiopathic aplastic anemia, provoked DVT x 2 and PE who presents with bloody stool/lower GI bleed.    NUTRITION HISTORY  Pt reported decreased appetite and poor PO PTA.   *unable to obtain: attempted visit but pt resting soundly and unable to wake with calls and gentle touch.     CURRENT NUTRITION ORDERS  Diet: Regular  Intake/Tolerance: no intake recorded on flowsheets    LABS  Labs reviewed    MEDICATIONS  Medications reviewed  Miralax/Senokot   Prednisone  IVF @ 50 ml/hr     ANTHROPOMETRICS  Height: 160 cm (5' 3\") 63\"  Most Recent Weight: 60.283 kg (132 lb 14.4 oz)    IBW: 52 kg  BMI: Normal BMI  Weight History:   Wt Readings from Last 8 Encounters:   01/24/17 60.283 kg (132 lb 14.4 oz)   01/17/17 61.598 kg (135 lb 12.8 oz)   01/13/17 64.638 kg (142 lb 8 oz)   01/04/17 62.506 kg (137 lb 12.8 oz)   12/29/16 64.003 kg (141 lb 1.6 oz)   12/08/16 61.508 kg (135 lb 9.6 oz)   11/30/16 62.143 kg (137 lb)   11/17/16 62.778 kg (138 lb 6.4 oz)   2% loss over 1 week. 6% weight loss over ~ 1 month     Dosing Weight: 60 kg (current/driest weight)    ASSESSED NUTRITION NEEDS  Estimated Energy Needs: 4315-9594 kcals/day (25 - 30 kcals/kg)  Justification: Maintenance  Estimated Protein Needs: 60-72 grams protein/day (1 - 1.2 grams of pro/kg)  Justification: Maintenance  Estimated Fluid Needs: 2568-2499 mL/day (1 mL/kcal)   Justification: Maintenance and Per " provider pending fluid status    PHYSICAL FINDINGS  See malnutrition section below.  Assess as able; unable to wake pt upon visit; pt wrapped in blankets and did not disturb.      MALNUTRITION  % Intake: Assess as able    % Weight Loss: > 5% in 1 month (severe)  Subcutaneous Fat Loss: Unable to assess     Muscle Loss: Unable to assess  Fluid Accumulation/Edema: Unable to assess  Malnutrition Diagnosis: Unable to determine due to unable to complete parameters of NFPA due to above noted     NUTRITION DIAGNOSIS  Inadequate protein-energy intake related to decreased appetite as evidenced by decreased PO PTA and at present      INTERVENTIONS  Implementation  Nutrition Education: No education needs assessed at this time   Medical food supplement therapy: ordered as highlighted above     Goals  Patient to consume % of nutritionally adequate meal trays TID, or the equivalent with supplements/snacks.    Monitoring/Evaluation  Progress toward goals will be monitored and evaluated per protocol.     Mary Bowen RD, LD  Unit Pager:  [phone]

## 2017-01-25 ENCOUNTER — CARE COORDINATION (OUTPATIENT)
Dept: CARDIOLOGY | Facility: CLINIC | Age: 72
End: 2017-01-25

## 2017-01-25 ENCOUNTER — APPOINTMENT (OUTPATIENT)
Dept: PHYSICAL THERAPY | Facility: CLINIC | Age: 72
DRG: 393 | End: 2017-01-25
Payer: COMMERCIAL

## 2017-01-25 VITALS
DIASTOLIC BLOOD PRESSURE: 69 MMHG | HEIGHT: 63 IN | WEIGHT: 133.9 LBS | TEMPERATURE: 96.8 F | BODY MASS INDEX: 23.73 KG/M2 | RESPIRATION RATE: 18 BRPM | HEART RATE: 85 BPM | SYSTOLIC BLOOD PRESSURE: 121 MMHG | OXYGEN SATURATION: 98 %

## 2017-01-25 LAB
ALBUMIN SERPL-MCNC: 2.5 G/DL (ref 3.4–5)
ALP SERPL-CCNC: 84 U/L (ref 40–150)
ALT SERPL W P-5'-P-CCNC: 41 U/L (ref 0–50)
ANION GAP SERPL CALCULATED.3IONS-SCNC: 10 MMOL/L (ref 3–14)
AST SERPL W P-5'-P-CCNC: 10 U/L (ref 0–45)
BASOPHILS # BLD AUTO: 0 10E9/L (ref 0–0.2)
BASOPHILS NFR BLD AUTO: 0 %
BILIRUB DIRECT SERPL-MCNC: 1 MG/DL (ref 0–0.2)
BILIRUB SERPL-MCNC: 2.1 MG/DL (ref 0.2–1.3)
BLD PROD TYP BPU: NORMAL
BLD UNIT ID BPU: 0
BLOOD PRODUCT CODE: NORMAL
BPU ID: NORMAL
BUN SERPL-MCNC: 18 MG/DL (ref 7–30)
CALCIUM SERPL-MCNC: 7.8 MG/DL (ref 8.5–10.1)
CHLORIDE SERPL-SCNC: 107 MMOL/L (ref 94–109)
CO2 SERPL-SCNC: 23 MMOL/L (ref 20–32)
COPATH REPORT: NORMAL
CREAT SERPL-MCNC: 0.67 MG/DL (ref 0.52–1.04)
DIFFERENTIAL METHOD BLD: ABNORMAL
EOSINOPHIL # BLD AUTO: 0 10E9/L (ref 0–0.7)
EOSINOPHIL NFR BLD AUTO: 0.4 %
ERYTHROCYTE [DISTWIDTH] IN BLOOD BY AUTOMATED COUNT: 17.7 % (ref 10–15)
GFR SERPL CREATININE-BSD FRML MDRD: 86 ML/MIN/1.7M2
GLUCOSE SERPL-MCNC: 78 MG/DL (ref 70–99)
HCT VFR BLD AUTO: 26.4 % (ref 35–47)
HGB BLD-MCNC: 8.9 G/DL (ref 11.7–15.7)
IMM GRANULOCYTES # BLD: 0 10E9/L (ref 0–0.4)
IMM GRANULOCYTES NFR BLD: 0 %
LYMPHOCYTES # BLD AUTO: 2.3 10E9/L (ref 0.8–5.3)
LYMPHOCYTES NFR BLD AUTO: 87 %
MAGNESIUM SERPL-MCNC: 1.4 MG/DL (ref 1.6–2.3)
MCH RBC QN AUTO: 29.4 PG (ref 26.5–33)
MCHC RBC AUTO-ENTMCNC: 33.7 G/DL (ref 31.5–36.5)
MCV RBC AUTO: 87 FL (ref 78–100)
MONOCYTES # BLD AUTO: 0 10E9/L (ref 0–1.3)
MONOCYTES NFR BLD AUTO: 1.1 %
NEUTROPHILS # BLD AUTO: 0.3 10E9/L (ref 1.6–8.3)
NEUTROPHILS NFR BLD AUTO: 11.5 %
NRBC # BLD AUTO: 0 10*3/UL
NRBC BLD AUTO-RTO: 1 /100
NUM BPU REQUESTED: 1
NUM BPU REQUESTED: 1
PHOSPHATE SERPL-MCNC: 2.5 MG/DL (ref 2.5–4.5)
PLATELET # BLD AUTO: 28 10E9/L (ref 150–450)
PLATELET # BLD EST: ABNORMAL 10*3/UL
POTASSIUM SERPL-SCNC: 4 MMOL/L (ref 3.4–5.3)
PROT SERPL-MCNC: 6 G/DL (ref 6.8–8.8)
RBC # BLD AUTO: 3.03 10E12/L (ref 3.8–5.2)
SODIUM SERPL-SCNC: 140 MMOL/L (ref 133–144)
TRANSFUSION STATUS PATIENT QL: NORMAL
TRANSFUSION STATUS PATIENT QL: NORMAL
WBC # BLD AUTO: 2.7 10E9/L (ref 4–11)

## 2017-01-25 PROCEDURE — 25000131 ZZH RX MED GY IP 250 OP 636 PS 637: Performed by: PHYSICIAN ASSISTANT

## 2017-01-25 PROCEDURE — 80048 BASIC METABOLIC PNL TOTAL CA: CPT | Performed by: PHYSICIAN ASSISTANT

## 2017-01-25 PROCEDURE — 25000128 H RX IP 250 OP 636: Performed by: PHYSICIAN ASSISTANT

## 2017-01-25 PROCEDURE — 84100 ASSAY OF PHOSPHORUS: CPT | Performed by: PHYSICIAN ASSISTANT

## 2017-01-25 PROCEDURE — 85025 COMPLETE CBC W/AUTO DIFF WBC: CPT | Performed by: PHYSICIAN ASSISTANT

## 2017-01-25 PROCEDURE — 83735 ASSAY OF MAGNESIUM: CPT | Performed by: PHYSICIAN ASSISTANT

## 2017-01-25 PROCEDURE — 97116 GAIT TRAINING THERAPY: CPT | Mod: GP | Performed by: PHYSICAL THERAPIST

## 2017-01-25 PROCEDURE — 97530 THERAPEUTIC ACTIVITIES: CPT | Mod: GP | Performed by: PHYSICAL THERAPIST

## 2017-01-25 PROCEDURE — P9037 PLATE PHERES LEUKOREDU IRRAD: HCPCS | Performed by: PHYSICIAN ASSISTANT

## 2017-01-25 PROCEDURE — 40000193 ZZH STATISTIC PT WARD VISIT: Performed by: PHYSICAL THERAPIST

## 2017-01-25 PROCEDURE — 25000132 ZZH RX MED GY IP 250 OP 250 PS 637: Performed by: PHYSICIAN ASSISTANT

## 2017-01-25 PROCEDURE — 36592 COLLECT BLOOD FROM PICC: CPT | Performed by: PHYSICIAN ASSISTANT

## 2017-01-25 PROCEDURE — 85027 COMPLETE CBC AUTOMATED: CPT | Performed by: PHYSICIAN ASSISTANT

## 2017-01-25 PROCEDURE — 25000132 ZZH RX MED GY IP 250 OP 250 PS 637: Performed by: INTERNAL MEDICINE

## 2017-01-25 PROCEDURE — 40000802 ZZH SITE CHECK

## 2017-01-25 PROCEDURE — 80076 HEPATIC FUNCTION PANEL: CPT | Performed by: PHYSICIAN ASSISTANT

## 2017-01-25 PROCEDURE — 25000125 ZZHC RX 250: Performed by: PHYSICIAN ASSISTANT

## 2017-01-25 PROCEDURE — 99238 HOSP IP/OBS DSCHRG MGMT 30/<: CPT | Performed by: INTERNAL MEDICINE

## 2017-01-25 RX ORDER — POLYETHYLENE GLYCOL 3350 17 G/17G
17 POWDER, FOR SOLUTION ORAL DAILY
Qty: 14 PACKET | Refills: 0 | Status: SHIPPED | OUTPATIENT
Start: 2017-01-25 | End: 2017-05-04

## 2017-01-25 RX ORDER — AMOXICILLIN 250 MG
CAPSULE ORAL
Qty: 100 TABLET | Refills: 1 | COMMUNITY
Start: 2017-01-25 | End: 2017-05-04

## 2017-01-25 RX ORDER — DIPHENHYDRAMINE HCL 25 MG
25 TABLET ORAL
Qty: 56 TABLET | COMMUNITY
Start: 2017-01-25

## 2017-01-25 RX ORDER — CYCLOSPORINE 100 MG/1
175 CAPSULE, LIQUID FILLED ORAL 2 TIMES DAILY
Qty: 120 CAPSULE | COMMUNITY
Start: 2017-01-25 | End: 2017-02-10

## 2017-01-25 RX ADMIN — DICLOFENAC SODIUM 2 G: 10 GEL TOPICAL at 07:12

## 2017-01-25 RX ADMIN — PREDNISONE 60 MG: 50 TABLET ORAL at 08:37

## 2017-01-25 RX ADMIN — CYCLOSPORINE 200 MG: 100 CAPSULE, LIQUID FILLED ORAL at 08:37

## 2017-01-25 RX ADMIN — Medication 2 G: at 11:28

## 2017-01-25 RX ADMIN — PANTOPRAZOLE SODIUM 40 MG: 40 TABLET, DELAYED RELEASE ORAL at 08:37

## 2017-01-25 RX ADMIN — SENNOSIDES AND DOCUSATE SODIUM 2 TABLET: 8.6; 5 TABLET ORAL at 08:37

## 2017-01-25 RX ADMIN — ACETAMINOPHEN 500 MG: 500 TABLET, FILM COATED ORAL at 04:33

## 2017-01-25 RX ADMIN — SODIUM CHLORIDE: 9 INJECTION, SOLUTION INTRAVENOUS at 00:02

## 2017-01-25 ASSESSMENT — PAIN DESCRIPTION - DESCRIPTORS
DESCRIPTORS: ACHING

## 2017-01-25 NOTE — PROGRESS NOTES
01/24/17 1645   Quick Adds   Type of Visit Initial PT Evaluation   Living Environment   Lives With alone   Living Arrangements house   Home Accessibility stairs to enter home;stairs within home;bed and bath on same level   Number of Stairs to Enter Home 4   Number of Stairs Within Home 12   Stair Railings at Home none;inside, present on right side   Transportation Available family or friend will provide   Self-Care   Dominant Hand right   Usual Activity Tolerance good   Current Activity Tolerance moderate   Equipment Currently Used at Home walker, rolling   Functional Level Prior   Ambulation 0-->independent   Transferring 0-->independent   Toileting 0-->independent   Bathing 0-->independent   Dressing 0-->independent   Eating 0-->independent   Communication 0-->understands/communicates without difficulty   Swallowing 0-->swallows foods/liquids without difficulty   Cognition 0 - no cognition issues reported   Fall history within last six months no   Which of the above functional risks had a recent onset or change? none   Prior Functional Level Comment vacumiong is harder to do, lifting heavy items  is hard. Has Beagle and a cat   General Information   Onset of Illness/Injury or Date of Surgery - Date 01/23/17   Referring Physician Lana Chaney PA-C   Patient/Family Goals Statement to go home   Pertinent History of Current Problem (include personal factors and/or comorbidities that impact the POC) Bonny Raymundo is a 71 year old female with PMH significant for h/o DVT/PE, HTN during rent admission, and idiopathic aplastic anemia (s/p ATG with ongoing cyclosporine, Prednisone and Eltrombopag) who presents with bright red blood per rectum.    Precautions/Limitations fall precautions;spinal precautions   Weight-Bearing Status - LUE full weight-bearing   Weight-Bearing Status - RUE full weight-bearing   Weight-Bearing Status - LLE full weight-bearing   Weight-Bearing Status - RLE full weight-bearing   Heart Disease Risk  Factors Gender;Medical history;High blood pressure;Lack of physical activity   General Info Comments pt has osteoporosis   Cognitive Status Examination   Orientation orientation to person, place and time   Level of Consciousness alert   Follows Commands and Answers Questions 100% of the time   Personal Safety and Judgment intact   Memory intact   Pain Assessment   Patient Currently in Pain Yes, see Vital Sign flowsheet   Posture    Posture Forward head position;Protracted shoulders   Range of Motion (ROM)   ROM Comment R shoulder lacks 25 degrees of active shoulder flexion, hamstring mm are tight   Strength   Strength Comments UE 4/5 with R shoudler 4-/5, LE 4+/5   Bed Mobility   Bed Mobility Comments pt is able to roll and move in bed on her own. bed mobility technique needs improvement   Transfer Skills   Transfer Comments pt transfers sit to stand with use of UE to push off surface   Gait   Gait Comments pt walked 50 feet without AD and was slightly unsteady. Pt walked without shoes on and has flat feet which impacted her walking. Pt did better with UE support on IV pole   Balance   Balance Comments Pt sits with good balancd in static position. Standing balance good in static position. Dynamic standing balance is fair   Muscle Tone   Muscle Tone no deficits were identified   General Therapy Interventions   Planned Therapy Interventions balance training;bed mobility training;gait training;strengthening;home program guidelines;risk factor education;transfer training;progressive activity/exercise   Clinical Impression   Criteria for Skilled Therapeutic Intervention yes, treatment indicated   PT Diagnosis impaired functiona mobility   Influenced by the following impairments general weakness, LBP, osteoporosis, fatigue from low HgB   Functional limitations due to impairments ability to walk community distances, difficulty vacumming and taking showers, difficulty cleaning her home and carrying items in home   Clinical  "Presentation Evolving/Changing   Clinical Presentation Rationale pt has been weaker and now is finding herself needing more assist at home. She has been hospitalized 2 x in one month   Clinical Decision Making (Complexity) Low complexity   Therapy Frequency` daily   Predicted Duration of Therapy Intervention (days/wks) 1/28/17   Anticipated Equipment Needs at Discharge reacher;shower chair  (hand held shower nozzle)   Anticipated Discharge Disposition Home with Home Therapy   Risk & Benefits of therapy have been explained Yes   Patient, Family & other staff in agreement with plan of care Yes   [facility] TM \"6 Clicks\"   2016, Trustees of [facility], under license to [employer].  All rights reserved.   6 Clicks Short Forms Basic Mobility Inpatient Short Form   [facility]  \"6 Clicks\" V.2 Basic Mobility Inpatient Short Form   1. Turning from your back to your side while in a flat bed without using bedrails? 4 - None   2. Moving from lying on your back to sitting on the side of a flat bed without using bedrails? 3 - A Little   3. Moving to and from a bed to a chair (including a wheelchair)? 3 - A Little   4. Standing up from a chair using your arms (e.g., wheelchair, or bedside chair)? 4 - None   5. To walk in hospital room? 3 - A Little   6. Climbing 3-5 steps with a railing? 4 - None   Basic Mobility Raw Score (Score out of 24.Lower scores equate to lower levels of function) 21   Total Evaluation Time   Total Evaluation Time (Minutes) 10     "

## 2017-01-25 NOTE — PROGRESS NOTES
This is a patient of [facility] and will be follow by one of their Care Coordinators for Post Discharge Follow up      [facility], [place]  History and Physical  Hematology / Oncology      Date of Admission:  1/23/2017  Date of Service (when I saw the patient): 01/23/2017

## 2017-01-25 NOTE — DISCHARGE INSTRUCTIONS
_______________________________________________________________  Transportation  [facility] Transportation  Rides are available to any [facility], [facility] clinic or [facility] Adult Day program for adults age 55 plus or to people who are temporarily disabled and unable to drive themselves. Rides must be scheduled within the [facility] area and within 20 miles of [place]. Rides must be scheduled at least 24 hours in advance. The cost to the patient is $5 per round trip (prices subject to change). Services not available to Assisted Living or Care Center sites that provide transportation. Our hours are Monday-Thursday, 7a.m. - 5:30 p.m. and Fridays, 7 a.m. to 3:30 p.m. Call [phone] or e-mail [email or web address]  ________________________________________________________________________________________________    [facility] Meals on Wheels  Staff Member Name: Danielle White  Email:  [email or web address]  Address: [facility] [place] [address]  Phone number: [phone]

## 2017-01-25 NOTE — PLAN OF CARE
Problem: Gastrointestinal Bleeding (Adult)  Goal: Signs and Symptoms of Listed Potential Problems Will be Absent or Manageable (Gastrointestinal Bleeding)  Signs and symptoms of listed potential problems will be absent or manageable by discharge/transition of care (reference Gastrointestinal Bleeding (Adult) CPG).   Outcome: No Change  VSS, afebrile, no c/o nausea. Pain in lower back controlled with Tylenol and voltaren gel. Cts remain stable and q8 hr Hgb draws still in place. Pt denies any rectal bleeding at this time. Continue monitoring and advise providers of any changes.

## 2017-01-25 NOTE — PROGRESS NOTES
Care Coordinator- Discharge Planning     Admission Date/Time:  1/23/2017  Attending MD:  Laisha Joseph MD  Hematologist: Dr. Rogel, [facility]     Data  Date of initial CC assessment:  1/25/2017  Chart reviewed, discussed with int/erdisciplinary team.   Patient was admitted for:   1. Hemorrhoids, unspecified hemorrhoid type    2. BRBPR (bright red blood per rectum)    3. Idiopathic aplastic anemia (H)    4. Thrombocytopenia (H)    5. Left-sided low back pain without sciatica, unspecified chronicity    6. Constipation, unspecified constipation type    7. Physical deconditioning         Assessment  Concerns with insurance coverage for discharge needs: None.  Current Living Situation: Patient lives alone.  Support System: Supportive and Involved (Patient's brother at bedside; requested information regarding home care, transportation and home nutrition support.)  Services Involved: Home Care  Transportation: [facility] for appointments. Patient's brother will provide transport home from hospital.   Barriers to Discharge: chronically ill and lives alone      Coordination of Care and Referrals: Provided patient/family with options for Home Care.    - Per DOROTA Rodriguez, patient stable to discharge today with clinic follow up. Patient scheduled to see Dr. Rogel tomorrow in clinic. Writer sent request to [facility] for labs and transfusions to be done at [facility] tomorrow; currently scheduled for [facility].   - Physical therapy recommends patient discharge home with RN and PT visits. Prior to admission, referral made to [facility] for RN visits. Per patient, initial assessment was not completed as patient was too busy with clinic appts. Patient confirmed that she would like referral made to [facility]. Writer updated AVS and notified [facility] that patient is discharging today; she is currently on their schedule for 1/27. Writer to fax orders when available.  - Prabha  KELSEA Graf, will meet with patient regarding [facility] and home nutrition resources.      Plan  Anticipated Discharge Date:  1/25/2017  Anticipated Discharge Plan:  Home with home care visits and clinic follow up.     CTS Handoff completed:  YES (DELGADO Griffith)    DELGADO Obrien  Phone [phone]  Pager [phone]

## 2017-01-25 NOTE — PLAN OF CARE
Problem: Gastrointestinal Bleeding (Adult)  Goal: Signs and Symptoms of Listed Potential Problems Will be Absent or Manageable (Gastrointestinal Bleeding)  Signs and symptoms of listed potential problems will be absent or manageable by discharge/transition of care (reference Gastrointestinal Bleeding (Adult) CPG).   Outcome: No Change  Pt did not have any stools today and did take 1 senna.  Eating well and denies any abd pain.  Does have pain in lower back.  DC to home today.  DC instructions and medication reviewed with pt.  PICC in place and care for PICC are done in clinic.

## 2017-01-25 NOTE — PLAN OF CARE
Problem: Goal Outcome Summary  Goal: Goal Outcome Summary  PT 7D- pt seen for eval and intervention. Pt with some LBP. Pt educated in body mechanics for safer transfer into and out of bed 2nd her osteoporosis. Pt walked 250 feet with UE support on an IV pole ( a long distance for  Her). Pt able to climb stairs with a railing. Pt would benefit from home health care PT, RN, and aide. She needs more assist at home for house work and carrying items like cat litter, etc. Pt is not walking with a ww but uses one at her bedside for transfer assist. Recommend a reacher, shower chair. Home PT could work with her on strengthening, balance, and her ability to move and do activities in her home

## 2017-01-25 NOTE — DISCHARGE SUMMARY
[facility], [place]    Discharge Summary  Hematology / Oncology    Date of Admission:  1/23/2017  Date of Discharge:  1/25/2017  3:07 PM  Discharging Provider: Lana Chaney PA-C / Dr. Ashley Pascual  Date of Service (when I saw the patient): 01/25/2017    Discharge Diagnoses  Active Problems:    Aplastic anemia (H)    Rectal bleed presumed due to hemorrhoidal bleeding in setting of severe thromobocytopenia    History of Present Illness  Bonny Raymundo is a 71 year old female with PMH significant for h/o DVT/PE, HTN during rent admission, and idiopathic aplastic anemia (s/p ATG with ongoing cyclosporine, Prednisone and Eltrombopag) who presented to the ED on 1/23/17 with bright red blood per rectum.     As per H&P, pt had stool with passage of dark red clot overnight (1/22-1/23) after approx 3-4 days of no BM. States he did push with this stool. Note that this was after pt presented to clinic for count check on 1/22 and received 1U PRBCs for Hgb 7.8. After bleeding noted overnight, pt presented to ED on 1/23 AM and at that time Hgb was 9.1 and Plt count 7K. Coags WNL. She had 2 soft/formed stools in the ED (starting approx 10 hours after her overnight stool), both with red blood per rectum but no passage of clots per her report. Does not have h/o hemorrhoids, nor rectal pain. Small, non-tender and non-bleeding external hemorrhoid appreciated on 1/23 external exam. Pt endorsed some mild abdominal discomfort, cramping and intermittent nausea, but no rosie abdominal pain. No vomiting. She was admitted to the Heme Malignancy service for further management.     Hospital Course  Bonny Raymundo was admitted on 1/23/2017.  The following problems were addressed during her hospitalization:    GI:   # Hematochezia/BRBPR. Possible relation to hemorrhoids, exacerbated by severe TCP. IMPROVED throughout admission with close monitoring of her Hgb and Plt support to keep Plt >30K. She had a smear  of stool with red blood noted on toilet paper (1/23 evening) and no stool overnight (1/23-1/24). Then on 1/24 AM she reported soft stool with only pink tinged coloring on toilet paper. Her Hgb did downtrend from 9.1--> 7.9 (1/23-1/24), impoved to 9.1 again on 1/24 AM after 1U PRBCs overnight. Hgb then remained stable ~9 g/dL from 1/24-1/25. Plts did bump to <50K after 2U Plt in ED and they have again downtrended to 28K on day of discharge. Pt receive 1U Plts prior to discharge. Favor count trend relation to AA and therapy (vs. acute blood loss anemia). She will have labs checked on 1/25 prior to clinic visit.   - transfuse to maintain Hgb >8 and Plt >30K at this time (outpt standing Plt transfusion orders have been adjusted)  - s/p MIVF support throughout admission.   - tolerated regular diet throughout admission   - dosed reduce cyclosporine as below (which could be source of her generalized abdominal discomfort/nausea). No e/o UGI bleed at this time. She continues daily Protonix while on steroids.     # Hyperbilirubinemia. Unclear etiology. Tbili 2.0 (direct 1.3) on admission. This is increased from total bili 0.3 on 1/9. Abdominal exam benign/no RUQ pain, other LFTs WNL. Tbili stable at 2.1 (1.0 direct) on day of discharge. LDH was slightly elevated, but unclear if this is meaningful in context of underlying malignancy and recent therapy. Retic count not elevated to signify response to hemolysis. Peripheral smear also w/o e/o hemolysis. D/w RPH and no obvious drug relation. D/w GI and given exam and overall clinical picture, they did not recommend any further workup at this time.   - recommend continuing to trend outpt (ordered with 1/26 labs). Could consider Abd US outpt pending trend or change in clinical status.     HEME:  # Aplastic anemia. Initiated treatment with ATG, cyclosporine, steroid (MP followed by Pred) and eltrombopag; D1=1/9). She was discharged to continue Cyclosporine 250mg BID and Prednisone  60mg daily. She resumed eltrombopag on 1/19 (after drug had to be delivered to her house).    - continue cyclosporine, however reduced dose to 200mg BID (x 1/24 PM). D/w Dr. Joseph: there is not a drug level to aim for, just don't want the level too low (hers was higher at 384 on 1/20 check) and want to ensure tolerance.  Defer to outpt team regarding when to check level.   - continue Prednisone 60mg daily. Per recent clinic note, continue through 1/27, then taper. Can be further discussed at 1/26 clinic visit.   - increased Eltrombopag from 50mg to 150mg daily as per Dr. Rogel. Pt instructed to begin this dose on 1/25 PM (when she takes this medication). She receives eltrombopag from a [facility] and just received a month supply on 1/19, so does currently have enough to increase the dose from this supply, but will need a refill sooner than originally anticipated.     # Pancytopenia. 2/2 aplastic anemia and recent therapy.      ID:  # PPX.  - continue PTA fluconazole 200 mg daily, levaquin 500 mg daily    - added monthly inh Pentamidine (given inpt on 1/24) for PCP ppx. Would next be due ~2/24.     CV/PV:  # Hypertension. IMPROVED. Noted during recent admission for ATG therapy and thought possibly 2/2 drugs on treatment regime (?cyclosporine, Pred). Was managed with Norvasc, on which pt was discharged from that admission. We held Norvasc on current admission as pt was normotensive and while assessing hemodynamic status/stability given bleeding at presentation. She remained generally normotensive throughout admission, and with lower dose of cyclosporine, will continue to HOLD Norvasc on discharge to see if antihypertensive therapy is no longer warranted.      # Hx DVT/PE. Pt had 2 episodes of provoked LLE DVTs in 2012 and 2014 (travel related) with associated PE in 2012. Treated in the past with warfarin and lovenox. Transitioned to ASA which was stopped in the setting of thrombocytopenia. On no  anticoagulation or antiplatelet agent at this time.     Renal:   # ELSA. RESOLVED s/p IVF support. Appears baseline Cr ranges 0.7-0.9 with GFR >60. On presentation to ED, Cr 1.1 (GFR 48). Likely 2/2 hypovolemia. Encouraged PO fluid intake.     MSK:    # Low back pain. Improved with use of Tylenol and topical cream (diclofenac). Has not used oxycodone.     FEN:  - IVFs as above  - monitor lytes, replace per unit protocol  - nutrition consult given report of decreased appetite/PO intake    Prophylaxis/Misc:    # VTE Prophylaxis contraindicated due to thrombocytopenia  # GI. Continue PPI for GI ppx with steroids  # PT consulted. Appreciate assessment. Rec home PT, home RN, and aide support. Appreciate CC referring for this.     Dispo: Discharge to home with support of brother/sister-in-law. Pt has close f/u clinic visit on 1/26 with lab/possible transfusion appt if needed. Pending labs and transfusion need on 1/26, pt may need appt for lab/possible transfusion arranged for either 1/27 or 1/28 (if 1/29 visit is felt to be too far away).     Lana Chaney PA-C  Heme/Onc  [phone] (pager)    Primary Care Physician  Rafita Rogel      Discharge Disposition  Discharged to home  Condition at discharge: Stable    Consultations This Hospital Stay  VASCULAR ACCESS ADULT IP CONSULT  VASCULAR ACCESS ADULT IP CONSULT  VASCULAR ACCESS ADULT IP CONSULT  MEDICATION HISTORY IP PHARMACY CONSULT  NUTRITION SERVICES ADULT IP CONSULT  PHYSICAL THERAPY ADULT IP CONSULT  GI LUMINAL ADULT IP CONSULT  VASCULAR ACCESS ADULT IP CONSULT    Discharge Orders    Home care nursing referral     Home Care PT Referral for Hospital Discharge     Reason for your hospital stay   Admission for rectal bleeding, presumed due to hemorrhoid in setting of low platelets.     Activity   Your activity upon discharge: activity as tolerated     IV access   **Ordering Provider MUST call/page Care Coordinator/ to discuss arranging this  service**    You are going home with the following vascular access device: PICC.     When to contact your care team   Call [facility] [place] [facility] triage line at [phone] for any of the following: temperature >100.4, recurrent rectal bleeding, any new bleeding not relieved with pressure, uncontrolled nausea/vomiting/diarrhea/constipation, unrelieved pain, dizziness, chest pain, shortness of breath, loss of consciousness, and any new or concerning symptoms.     Follow Up and recommended labs and tests   1. Follow-up as scheduled to see Dr. Rogel on 1/26/17. Your lab/possible transfusion appointments will be adjusted to coordinate with this visit.   2. Depending on your labs and transfusion needs in clinic on 1/26, your next appointment for labs and possible transfusion will be determined.   3. You are discharging with a REDUCED Cyclosporine dose. This is now 200 mg twice a day.   4. STOP taking your Norvasc (amlodipine) as your blood pressures have been better and we anticipate your blood pressures to remain normal with the lower dose of the Cyclosporine medication.   5. Continue to take Senna-S to help with moving and softening your bowels. You can take 1-2 tablets up to twice a day. Can start with 1-2 tablets daily. If no bowel movement within 24 hours, can increase the dosing to 1-2 tablets twice a day. HOLD if you develop diarrhea. Then resume taking if no bowel movement in 24 hours.    6. We have ADDED Miralax (to help move your bowels). Start taking 1 packet daily. If you start to have loose stools/diarrhea or multiple bowel movements, it is ok to hold this medication. Then resume if no bowel movement after 24-48hours.  7. The outpatient team can coordinate when it will be best for you to undergo an outpatient colonoscopy, as discussed with the Gastroenterology (GI) team that saw you in the hospital.  8. We are INCREASING your Eltrombopag. Begin taking 150 mg (3 of the 50mg tablets) starting on 1/25  evening. This was directed by Dr. Rogel.     Diet   Follow this diet upon discharge: Regular diet       Discharge Medications  Discharge Medication List as of 1/25/2017  2:58 PM      START taking these medications    Details   polyethylene glycol (MIRALAX/GLYCOLAX) Packet Take 17 g by mouth daily . If you start to have loose stools/diarrhea or multiple bowel movements, ok to hold this medication. Then resume if no bowel movement after 24-48hours., Disp-14 packet, R-0, Local Print         CONTINUE these medications which have CHANGED    Details   diphenhydrAMINE (BENADRYL ALLERGY) 25 MG tablet Take 1 tablet (25 mg) by mouth nightly as needed, Disp-56 tablet, Historical      cycloSPORINE modified (GENERIC EQUIVALENT) 100 MG capsule Take 2 capsules (200 mg) by mouth 2 times daily . This reflects a dose change., Disp-120 capsule, Historical      senna-docusate (SENNA S) 8.6-50 MG per tablet Can take 1-2 tablets up to twice a day. Can start with 1-2 tablets daily. If no bowel movement within 24 hours, can take 1-2 tablets twice a day. HOLD if you develop diarrhea. Resume taking if no bowel movement in 24 hours., Disp-100 tablet, R-1, Histori francesco      eltrombopag (PROMACTA) 50 MG tablet Take 3 tablets (150 mg) by mouth daily Administer on an empty stomach, 1 hour before or 2 hours after a meal. This represents an INCREASE in the dose, as per Dr. Rogel., Disp-30 tablet, R-1, Historical         CONTINUE these medications which have NOT CHANGED    Details   oxyCODONE (ROXICODONE) 5 MG IR tablet Take 1 tablet (5 mg) by mouth every 6 hours as needed for moderate to severe pain, Disp-50 tablet, R-0, Local Print      predniSONE (DELTASONE) 20 MG tablet Take 3 tablets (60 mg) by mouth daily, Disp-42 tablet, R-0, E-Prescribe      pantoprazole (PROTONIX) 40 MG EC tablet Take 1 tablet (40 mg) by mouth every morning, Disp-30 tablet, R-1, E-Prescribe      fluconazole (DIFLUCAN) 200 MG tablet Take 1 tablet (200 mg) by mouth  daily, Disp-30 tablet, R-3, Historical      diclofenac (VOLTAREN) 1 % GEL topical gel Apply 2 g topically 2 times dailyDisp-100 g, [id number]      levofloxacin (LEVAQUIN) 500 MG tablet Take 1 tablet (500 mg) by mouth daily, Disp-30 tablet, R-3, E-Prescribe      Acetaminophen (TYLENOL PO) Take 325 mg by mouth every 6 hours as needed for mild pain or fever, Historical      loratadine (CLARITIN) 10 MG tablet Take 10 mg by mouth daily as needed. For head cold , Historical      chlorpheniramine (CHLOR-TRIMETON) 4 MG tablet Take 4 mg by mouth every 6 hours as needed. For head cold , Historical      COMPRESSION STOCKINGS Wear compression stockings at 20-30 mmHg rating most time during the day to the affected leg (left leg) or both legs. Take them off at night., Disp-2 each, R-2, Local Print      ORDER FOR DME Equipment being ordered: knee high compression stockings- 18-20 mmDisp-1 Box, R-1, Normal         STOP taking these medications       amLODIPine (NORVASC) 5 MG tablet Comments:   Reason for Stopping:         calcium-vitamin D (CALTRATE) 600-400 MG-UNIT per tablet Comments:   Reason for Stopping:         MULTIVITAMIN TABS   OR Comments:   Reason for Stopping:             Allergies  Allergies   Allergen Reactions     Cats      Dogs      No Known Drug Allergies      Seasonal Allergies      Data     BMP  Recent Labs  Lab 01/25/17  0631 01/24/17  0843 01/24/17  0647 01/23/17  0944    141 140 142   POTASSIUM 4.0 3.8 7.3* 4.0   CHLORIDE 107 107 109 108   LEO 7.8* 8.2* 7.9* 8.5   CO2 23 23 23 25   BUN 18 20 21 33*   CR 0.67 0.84 0.74 1.11*   GLC 78 103* 88 116*     CBC  Recent Labs  Lab 01/25/17  0631 01/24/17  1933 01/24/17  1337 01/24/17  0647   WBC 2.7* 1.3* 1.6* 2.8*   RBC 3.03* 3.08* 3.25* 3.03*   HGB 8.9* 9.2* 9.6* 9.1*   HCT 26.4* 26.6* 28.3* 26.4*   MCV 87 86 87 87   MCH 29.4 29.9 29.5 30.0   MCHC 33.7 34.6 33.9 34.5   RDW 17.7* 18.0* 18.4* 18.2*   PLT 28* 34* 36* 41*     INR  Recent Labs  Lab 01/23/17  0944    INR 1.10       Results for orders placed or performed during the hospital encounter of 01/23/17   XR Chest 2 Views     Value    Radiologist flags Left PICC position. (Urgent)    Narrative    Exam:  XR CHEST 2 VW, 1/23/2017 11:50 AM    History: Shortness of breath    Comparison:  Chest radiograph from 1/10/2017.    Findings:  PA and lateral views of the chest were obtained. Left PICC  tip projects over the azygos vein. Cardiomediastinal silhouette is  within normal limits. Pulmonary vasculature is unremarkable. Right  retrocardiac nodule corresponds to mass visualized on prior CT  examination. Trace left pleural effusion. No pneumothorax. Visualized  upper abdomen is unremarkable. Stable multilevel compression  deformities in the mid and lower thoracic spine.      Impression    Impression:    1. Left PICC tip terminates in the azygos vein.  2. Trace left pleural effusion.  3. Right retrocardiac opacity is better evaluated on prior CT  examination.    [Urgent Result: Left PICC position.]    Finding was identified on 1/23/2017 11:50 AM.     Dr. Barcenas was contacted by Dr. Pratt at 1/23/2017 11:57 AM and  verbalized understanding of the urgent finding.     I have personally reviewed the examination and initial interpretation  and I agree with the findings.    ISMAEL KHOURY MD   XR Chest Port 1 View     Value    Radiologist flags Left PICC malposition (Urgent)    Narrative    Exam:  XR CHEST PORT 1 VW, 1/23/2017 1:51 PM    History: PICC placement    Comparison:  Chest radiograph from 10:37 AM on 1/23/2017.    Findings:  And glenohumeral AP view of the chest is obtained. Interval  traction of the right-sided PICC with the tip now projecting over the  proximal azygos vein. Cardiomediastinal silhouette is within normal  limits. There is no pleural effusion or pneumothorax. No focal  airspace opacity. The pulmonary vasculature is indistinct. The  visualized upper abdomen is unremarkable.      Impression    Impression:     1. Interval adjustment of the left sided PICC with the tip likely in  the proximal azygos vein.  2. No acute cardiopulmonary abnormality.    [Urgent Result: Left PICC malposition]    Finding was identified on 1/23/2017 1:51 PM.     Dr. Barcenas was contacted by Dr. Pratt at 1/23/2017 2:36 PM and  verbalized understanding of the urgent finding.     I have personally reviewed the examination and initial interpretation  and I agree with the findings.    ISMAEL KHOURY MD       I have seen, interviewed, and examined the patient independently.  I have reviewed the vital signs and labs.  This note reflects my assessment and plan.    Bonny's rectal bleeding appears to have been a thrombosed hemorrhoid in the setting of low plts. Very much appreciate GI assistance.    We discussed options and strategy for good bowel regimen to keep stool tone soft to prevent need to strain.    T bili remains elevated. Will be monitored as outpatient.    CSA level on the high range of her goal and BP is high (attributed to CSA), so we lowered the CSA dose to 200 BID, BP in better range and holding norvasc. Will need BP monitored, if this remains high may need norvasc restarted    Mg low today, replaced. Will monitor as outpatient, may need oral supplements if remains low (as some patients on CSA need).    I spoke with Dr. Rogel: will increase  Eltrombopag to 150 mg/day. We called her and told her to increase her dose.    She will have close clinic follow up (tomorrow) and frequent lab checks, bu tI encouraged her to call us if she has any concerning findings and to come to ED if she has significant bleeding anywhere.      Ashley Pascual MD/PhD

## 2017-01-25 NOTE — PLAN OF CARE
Problem: Goal Outcome Summary  Goal: Goal Outcome Summary  Outcome: No Change  A&O. VSS. Afebrile. Independent. No active bleeding. Tylenol x 1 for back pain. Possible discharge today. Will continue w/POC.

## 2017-01-25 NOTE — PLAN OF CARE
Problem: Goal Outcome Summary  Goal: Goal Outcome Summary  PT/7D: Pt trialed gait with fww vs cane vs no AD.  Increased stability noted with gait with use of cane/walker. Educated pt to utilize these upon discharge home, pt reporting having access to both AD. She ambulated ~200ft x2 and performed 4 stairs, CGA provided. Pt educated on proper body mechanics with activity. Recommend discharge home with HHPT.    Physical Therapy Discharge Summary    Reason for therapy discharge:    Discharged to home with home therapy.    Progress towards therapy goal(s). See goals on Care Plan in [facility] electronic health record for goal details.  Goals not met.  Barriers to achieving goals:   discharge from facility.    Therapy recommendation(s):    Continued therapy is recommended.  Rationale/Recommendations:  home PT to progress activity tolerance and improve balance.

## 2017-01-26 ENCOUNTER — INFUSION THERAPY VISIT (OUTPATIENT)
Dept: ONCOLOGY | Facility: CLINIC | Age: 72
End: 2017-01-26
Attending: INTERNAL MEDICINE
Payer: COMMERCIAL

## 2017-01-26 ENCOUNTER — CARE COORDINATION (OUTPATIENT)
Dept: CASE MANAGEMENT | Facility: CLINIC | Age: 72
End: 2017-01-26

## 2017-01-26 ENCOUNTER — OFFICE VISIT (OUTPATIENT)
Dept: TRANSPLANT | Facility: CLINIC | Age: 72
End: 2017-01-26
Attending: INTERNAL MEDICINE
Payer: COMMERCIAL

## 2017-01-26 ENCOUNTER — APPOINTMENT (OUTPATIENT)
Dept: LAB | Facility: CLINIC | Age: 72
End: 2017-01-26
Attending: INTERNAL MEDICINE
Payer: COMMERCIAL

## 2017-01-26 VITALS
BODY MASS INDEX: 23.55 KG/M2 | OXYGEN SATURATION: 97 % | HEART RATE: 104 BPM | DIASTOLIC BLOOD PRESSURE: 61 MMHG | SYSTOLIC BLOOD PRESSURE: 91 MMHG | RESPIRATION RATE: 18 BRPM | TEMPERATURE: 98.6 F | WEIGHT: 132.94 LBS

## 2017-01-26 VITALS — HEART RATE: 81 BPM | DIASTOLIC BLOOD PRESSURE: 74 MMHG | SYSTOLIC BLOOD PRESSURE: 134 MMHG

## 2017-01-26 DIAGNOSIS — D61.3 IDIOPATHIC APLASTIC ANEMIA (H): ICD-10-CM

## 2017-01-26 DIAGNOSIS — D61.818 PANCYTOPENIA (H): Primary | ICD-10-CM

## 2017-01-26 LAB
ABO + RH BLD: NORMAL
ABO + RH BLD: NORMAL
ALBUMIN SERPL-MCNC: 3.1 G/DL (ref 3.4–5)
ALP SERPL-CCNC: 112 U/L (ref 40–150)
ALT SERPL W P-5'-P-CCNC: 74 U/L (ref 0–50)
ANION GAP SERPL CALCULATED.3IONS-SCNC: 9 MMOL/L (ref 3–14)
ANISOCYTOSIS BLD QL SMEAR: SLIGHT
AST SERPL W P-5'-P-CCNC: 45 U/L (ref 0–45)
BASOPHILS # BLD AUTO: 0 10E9/L (ref 0–0.2)
BASOPHILS NFR BLD AUTO: 1 %
BILIRUB DIRECT SERPL-MCNC: 1.2 MG/DL (ref 0–0.2)
BILIRUB SERPL-MCNC: 2.2 MG/DL (ref 0.2–1.3)
BLD GP AB SCN SERPL QL: NORMAL
BLD PROD TYP BPU: NORMAL
BLD UNIT ID BPU: 0
BLOOD BANK CMNT PATIENT-IMP: NORMAL
BLOOD PRODUCT CODE: NORMAL
BPU ID: NORMAL
BUN SERPL-MCNC: 22 MG/DL (ref 7–30)
CALCIUM SERPL-MCNC: 8.5 MG/DL (ref 8.5–10.1)
CHLORIDE SERPL-SCNC: 105 MMOL/L (ref 94–109)
CO2 SERPL-SCNC: 24 MMOL/L (ref 20–32)
CREAT SERPL-MCNC: 0.73 MG/DL (ref 0.52–1.04)
DIFFERENTIAL METHOD BLD: ABNORMAL
EOSINOPHIL # BLD AUTO: 0 10E9/L (ref 0–0.7)
EOSINOPHIL NFR BLD AUTO: 0 %
ERYTHROCYTE [DISTWIDTH] IN BLOOD BY AUTOMATED COUNT: 16.9 % (ref 10–15)
GFR SERPL CREATININE-BSD FRML MDRD: 78 ML/MIN/1.7M2
GLUCOSE SERPL-MCNC: 175 MG/DL (ref 70–99)
HCT VFR BLD AUTO: 30.4 % (ref 35–47)
HGB BLD-MCNC: 10.6 G/DL (ref 11.7–15.7)
LYMPHOCYTES # BLD AUTO: 0.8 10E9/L (ref 0.8–5.3)
LYMPHOCYTES NFR BLD AUTO: 64 %
MCH RBC QN AUTO: 30.5 PG (ref 26.5–33)
MCHC RBC AUTO-ENTMCNC: 34.9 G/DL (ref 31.5–36.5)
MCV RBC AUTO: 88 FL (ref 78–100)
MONOCYTES # BLD AUTO: 0 10E9/L (ref 0–1.3)
MONOCYTES NFR BLD AUTO: 3 %
NEUTROPHILS # BLD AUTO: 0.4 10E9/L (ref 1.6–8.3)
NEUTROPHILS NFR BLD AUTO: 32 %
PLATELET # BLD AUTO: 42 10E9/L (ref 150–450)
POTASSIUM SERPL-SCNC: 4.3 MMOL/L (ref 3.4–5.3)
PROT SERPL-MCNC: 7.3 G/DL (ref 6.8–8.8)
RBC # BLD AUTO: 3.47 10E12/L (ref 3.8–5.2)
SODIUM SERPL-SCNC: 138 MMOL/L (ref 133–144)
SPECIMEN EXP DATE BLD: NORMAL
TRANSFUSION STATUS PATIENT QL: NORMAL
TRANSFUSION STATUS PATIENT QL: NORMAL
WBC # BLD AUTO: 1.3 10E9/L (ref 4–11)

## 2017-01-26 PROCEDURE — 82248 BILIRUBIN DIRECT: CPT | Performed by: PHYSICIAN ASSISTANT

## 2017-01-26 PROCEDURE — 96360 HYDRATION IV INFUSION INIT: CPT

## 2017-01-26 PROCEDURE — 86850 RBC ANTIBODY SCREEN: CPT | Performed by: INTERNAL MEDICINE

## 2017-01-26 PROCEDURE — 86901 BLOOD TYPING SEROLOGIC RH(D): CPT | Performed by: INTERNAL MEDICINE

## 2017-01-26 PROCEDURE — 80053 COMPREHEN METABOLIC PANEL: CPT | Performed by: PHYSICIAN ASSISTANT

## 2017-01-26 PROCEDURE — 25000128 H RX IP 250 OP 636: Mod: ZF | Performed by: INTERNAL MEDICINE

## 2017-01-26 PROCEDURE — 86900 BLOOD TYPING SEROLOGIC ABO: CPT | Performed by: INTERNAL MEDICINE

## 2017-01-26 PROCEDURE — 85025 COMPLETE CBC W/AUTO DIFF WBC: CPT | Performed by: PHYSICIAN ASSISTANT

## 2017-01-26 RX ORDER — PREDNISONE 10 MG/1
60 TABLET ORAL DAILY
Qty: 180 TABLET | Refills: 3 | Status: SHIPPED | OUTPATIENT
Start: 2017-01-26 | End: 2017-02-02

## 2017-01-26 RX ADMIN — SODIUM CHLORIDE 1000 ML: 9 INJECTION, SOLUTION INTRAVENOUS at 14:30

## 2017-01-26 ASSESSMENT — PAIN SCALES - GENERAL: PAINLEVEL: MODERATE PAIN (4)

## 2017-01-26 NOTE — Clinical Note
1/26/2017      RE: Bonny Raymundo  [address]  [place] [address]       BP 91/61 mmHg  Pulse 104  Temp(Src) 98.6  F (37  C) (Axillary)  Resp 18  Wt 60.3 kg (132 lb 15 oz)  SpO2 97%  Wt Readings from Last 4 Encounters:   01/26/17 60.3 kg (132 lb 15 oz)   01/25/17 60.737 kg (133 lb 14.4 oz)   01/17/17 61.598 kg (135 lb 12.8 oz)   01/13/17 64.638 kg (142 lb 8 oz)     Severe Aplastic anemia  Just OOH for hemorrhoidal or LGI bleeding. Given transfusion of RBC/plts.  NO major bleed recognized  Post ATG/CSA/Steroids/Eltrombopag for aplasia therpay early January.    No bleeding overnight  Some lightheaded ness on arrival with mild orthostatic BP; better post IV saline.  Trace blood/pink on toilet paper. NO visible rectal bleeding.  No oral, nasal,  bleeding  No fcs. No pain.  Eating ok since hosp discharge.    PHYSICAL EXAMINATION:   VITAL SIGNS:  Her exam shows acceptable vital signs.   LYMPH:  She has no peripheral lymphadenopathy.   EXTREMITIES:  She has no peripheral edema.   SKIN:  She has some rare petechiae in her lower extremities and her arms but they are not fresh.   HEENT:  Her oropharynx is clear without mucosal lesions.  She has no conjunctival hemorrhage.   LUNGS:  Clear.   CARDIOVASCULAR:  Heart tones are regular with a soft ejection murmur.   ABDOMEN:  Soft and nontender without hepatosplenomegaly or masses.      Labs;  Chemistry ok  CBC stable since yesterday.    IMpr;  Taking ongoing immunosuppression for aplasia   BID, pred 60mg/d; eltrombopag 150 mg/day    Cont and monitor csa levels targetting trough level 200-350.  If no changes will decrease pred to 40 mg/day next week.   Will taper slowly; only reducing by 10mg every other week.    Heme;    Transfuse for Hb <8  Plts <30,000  Conditional orders in place.    FEN   Ok. Monitor creatinine and BP while on CSA    GI/Nutrition.  Had some loose stools;   Better. Monitor external signs of GI bleed.    Plan:  Home care checkup  tomorrow    Sunday   To [facility] for CBC and possible transfusion    TUesday to To [facility] for CBC and possible transfusion; and CSA level    Thursday   RTC to [facility] for vist and labs and possible transfusion.    Pt advised about phone contacts for help/advice.  Also advised how to call for financial assistance in ongoing eltrombopag therapy.    Rafita Rogel MD  Professor of Medicine    Results for LISSETH PARIKH (MRN [id number]) as of 1/26/2017 17:18   Ref. Range 1/25/2017 06:31 1/25/2017 08:37 1/26/2017 01:00 1/26/2017 14:10   Sodium Latest Ref Range: 133-144 mmol/L 140   138   Potassium Latest Ref Range: 3.4-5.3 mmol/L 4.0   4.3   Chloride Latest Ref Range:  mmol/L 107   105   Carbon Dioxide Latest Ref Range: 20-32 mmol/L 23   24   Urea Nitrogen Latest Ref Range: 7-30 mg/dL 18   22   Creatinine Latest Ref Range: 0.52-1.04 mg/dL 0.67   0.73   GFR Estimate Latest Ref Range: >60 mL/min/1.7m2 86   78   GFR Estimate If Black Latest Ref Range: >60 mL/min/1.7m2 >90...   >90...   Calcium Latest Ref Range: 8.5-10.1 mg/dL 7.8 (L)   8.5   Anion Gap Latest Ref Range: 3-14 mmol/L 10   9   Magnesium Latest Ref Range: 1.6-2.3 mg/dL 1.4 (L)      Phosphorus Latest Ref Range: 2.5-4.5 mg/dL 2.5      Albumin Latest Ref Range: 3.4-5.0 g/dL 2.5 (L)   3.1 (L)   Protein Total Latest Ref Range: 6.8-8.8 g/dL 6.0 (L)   7.3   Bilirubin Total Latest Ref Range: 0.2-1.3 mg/dL 2.1 (H)   2.2 (H)   Alkaline Phosphatase Latest Ref Range:  U/L 84   112   ALT Latest Ref Range: 0-50 U/L 41   74 (H)   AST Latest Ref Range: 0-45 U/L 10   45   Bilirubin Direct Latest Ref Range: 0.0-0.2 mg/dL 1.0 (H)   1.2 (H)   Glucose Latest Ref Range: 70-99 mg/dL 78   175 (H)   WBC Latest Ref Range: 4.0-11.0 10e9/L 2.7 (L)   1.3 (L)   Hemoglobin Latest Ref Range: 11.7-15.7 g/dL 8.9 (L)   10.6 (L)   Hematocrit Latest Ref Range: 35.0-47.0 % 26.4 (L)   30.4 (L)   Platelet Count Latest Ref Range: 150-450 10e9/L 28 (LL)   42 (LL)   RBC Count Latest  Ref Range: 3.8-5.2 10e12/L 3.03 (L)   3.47 (L)   MCV Latest Ref Range:  fl 87   88   MCH Latest Ref Range: 26.5-33.0 pg 29.4   30.5   MCHC Latest Ref Range: 31.5-36.5 g/dL 33.7   34.9   RDW Latest Ref Range: 10.0-15.0 % 17.7 (H)   16.9 (H)   Diff Method Unknown Automated Method   Pending   % Neutrophils Latest Units: % 11.5      % Lymphocytes Latest Units: % 87.0      % Monocytes Latest Units: % 1.1      % Eosinophils Latest Units: % 0.4      % Basophils Latest Units: % 0.0      % Immature Granulocytes Latest Units: % 0.0      Nucleated RBCs Latest Ref Range: 0 /100 1 (H)      Absolute Neutrophil Latest Ref Range: 1.6-8.3 10e9/L 0.3 (LL)      Absolute Lymphocytes Latest Ref Range: 0.8-5.3 10e9/L 2.3      Absolute Monocytes Latest Ref Range: 0.0-1.3 10e9/L 0.0      Absolute Eosinophils Latest Ref Range: 0.0-0.7 10e9/L 0.0      Absolute Basophils Latest Ref Range: 0.0-0.2 10e9/L 0.0      Abs Immature Granulocytes Latest Ref Range: 0-0.4 10e9/L 0.0      Absolute Nucleated RBC Unknown 0.0      Platelet Estimate Unknown Confirming automa...      ABO Unknown    A   RH(D) Unknown    Pos   Antibody Screen Unknown    Neg   Test Valid Only At [facility]..   Specimen Expires Unknown    01/29/2017   Ordered Component Type Unknown  PLT Pheresis PLT Pheresis    Blood Component Type Unknown  PlateletPheresis,... PlateletPheresis,...    Unit Number Unknown  [id number]    Division Number Unknown  00 00    Status of Unit Unknown  Released to care ... Ready for patient...    Unit Status Unknown  [facility] GENEVA Rogel MD

## 2017-01-26 NOTE — NURSING NOTE
"Labs drawn via PICC.  Flushed with saline.  VS done.  Orthostatic and pt reporting \"a little bit lightheaded at times.\"  Dr. Rogel notified.    Raiza Cerrato  RN  "

## 2017-01-26 NOTE — MR AVS SNAPSHOT
After Visit Summary   1/26/2017    Bonny Raymundo    MRN: [id number]           Patient Information     Date Of Birth          1945        Visit Information        Provider Department      1/26/2017 4:00 PM UC 19 ATC;  ONCOLOGY INFUSION [facility]        Today's Diagnoses     Pancytopenia (H)    -  1       Care Instructions    Contact Numbers    [facility] Main Line: [phone]  [facility] Triage:  [phone]    Call triage with chills and/or temperature greater than or equal to 100.5, uncontrolled nausea/vomiting, diarrhea, constipation, dizziness, shortness of breath, chest pain, bleeding, unexplained bruising, or any new/concerning symptoms, questions/concerns.     If you are having any concerning symptoms or wish to speak to a provider before your next infusion visit, please call your care coordinator or triage to notify them so we can adequately serve you.       After Hours: [phone]    If after hours, weekends, or holidays, call main hospital  and ask for Oncology doctor on call.           January 2017 Sunday Monday Tuesday Wednesday Thursday Friday Saturday   1     2     3     4     Outpatient Visit    8:25 AM   Manpreet Mcdaniel MD [facility] Lab     LEVEL 0    2:00 PM   (30 min.)   [place] LAB 2   [facility] Infusion Center     RETURN    2:45 PM   (15 min.)   Manpreet Mcdaniel MD   [facility] Clinic 5     Outpatient Visit    8:54 AM   [place] [facility] Lab     LEVEL 4   10:00 AM   (240 min.)   SOUTH CHAIR 6   [facility]     [facility] ONC RETURN    2:00 PM   (30 min.)   Rafita Rogel MD   [facility] Blood and Marrow Transplant 6     7       8     9     IR PICC PLCMT > 5 YRS LEFT    9:00 AM   (60 min.)   [id number]   [facility], Vascular Access     Admission    9:58 AM   Merritt Murray MD   Unit 7D [facility]   (Discharge: 1/13/2017)     XR CHEST PORT 1 VIEW    9:35 PM   (20 min.)   UUXR1   [facility]  [place],  Radiology 10     XR CHEST PORT 1 VIEW   12:15 PM   (20 min.)   [id number]   [facility],  Radiology 11     12     13     UU CENTRAL VENOUS CATHETER    1:00 PM   (60 min.)   Shivani Hess, RN   [facility], [facility] 14       15     LEVEL 4    8:00 AM   (240 min.)   SOUTH CHAIR 8   [facility] and Infusion Center     Outpatient Visit    9:56 AM   Susan Root PA-C   [place] [facility] 16     17     [facility] LAB DRAW   12:00 PM   (15 min.)    MAS[facility] LAB DRAW   [facility] Mason[facility] Lab Draw     [facility] RETURN   12:30 PM   (50 min.)   Celine Walls PA-C   [facility] ONC INFUSION 120    3:30 PM   (120 min.)    ONCOLOGY INFUSION   [facility] 18     19     Outpatient Visit    8:22 AM   Manpreet Mcdaniel MD [place] [facility] Lab     US LWR EXT VENOUS DUPLEX RIGHT    9:10 AM   (30 min.)   SHUS5   [facility] Ultrasound     LEVEL 5   10:00 AM   (300 min.)   NORTH CHAIR 3   [facility] Cancer [facility] and Infusion Center 20     Outpatient Visit    8:11 AM   Manpreet Mcdaniel MD [place] [facility] Lab     LEVEL 0    8:30 AM   (30 min.)   NORTH CHAIR 6   [facility] and Infusion Center 21       22     Outpatient Visit    8:35 AM   Manpreet Mcdaniel MD [place] [facility] Lab     LEVEL 5    9:00 AM   (300 min.)   SOUTH CHAIR 2   [facility] and Infusion Center 23     Admission    9:31 AM   Laisha Joseph MD   Unit 7D [facility]   (Discharge: 1/25/2017)     XR CHEST 2 VIEWS   10:35 AM   (15 min.)   UUXR1   [facility],  Radiology     XR CHEST PORT 1 VIEW    1:15 PM   (20 min.)   UUXRPH1   [facility],  Radiology 24     IP EVALUATION    6:00 AM   (60 min.)   Brittaney Collins, PT   [facility], Physical Therapy 25     IP TREATMENT    6:00 AM   (30 min.)   Kathy Redman, PT   [facility] [place], Physical Therapy 26     [facility] LAB DRAW    2:30 PM   (15 min.)    MAS[facility]IC LAB DRAW   M  [facility] Masonic Lab Draw     [facility] ONC RETURN    3:00 PM   (30 min.)   Rafita Rogel MD   [facility] Blood and Marrow Transplant     [facility] ONC INFUSION 120    4:00 PM   (120 min.)    ONCOLOGY INFUSION   [facility] 27     28       29     LEVEL 5    9:00 AM   (300 min.)   SOUTH CHAIR 2   [facility] and Infusion Center 30     31     LEVEL 5    9:00 AM   (300 min.)   SOUTH CHAIR 1   [facility] and Infusion Center                                February 2017 Sunday Monday Tuesday Wednesday Thursday Friday Saturday                  1     2     LEVEL 5    9:30 AM   (300 min.)   NORTH CHAIR 3   [facility] and Infusion Center     [facility] MAS[facility] LAB DRAW   11:30 AM   (15 min.)    MASONIC LAB DRAW   [facility] Mason[facility] Lab Draw     [facility] ONC INFUSION 360   12:00 PM   (360 min.)    ONCOLOGY INFUSION   [facility]     [facility] ONC RETURN    3:30 PM   (30 min.)   Rafita Rogel MD   [facility] Blood and Marrow Transplant 3     4       5     LEVEL 5    9:00 AM   (300 min.)   SOUTH CHAIR 4   [facility] and Infusion Center 6     7     LEVEL 5    9:00 AM   (300 min.)   SOUTH CHAIR 2   [facility] and Infusion Center 8     9     10     [facility] LAB DRAW    9:00 AM   (15 min.)    MASONIC LAB DRAW   M [facility] Mason[facility] Lab Draw     [facility] RETURN    9:30 AM   (50 min.)   Celine Walls PA-C   [facility]     [facility] ONC INFUSION 360   10:30 AM   (360 min.)    ONCOLOGY INFUSION   [facility] 11       12     13     14     15     16     17     18       19     20     21     22     23     24     25       26     27     28                                     Recent Results (from the past 24 hour(s))   Platelets prepare order unit    Collection Time: 01/26/17  1:00 AM   Result Value Ref Range    Ordered Component Type PLT Pheresis     Units Ordered 1    Blood component    Collection Time: 01/26/17   1:00 AM   Result Value Ref Range    Unit Number [id number]     Blood Component Type PlateletPheresis,LeukoRed Irrad (Part 2)     Division Number 00     Status of Unit Ready for patient 01/26/2017 0729     Blood Product Code [id number]     Unit Status GENEVA    ABO/Rh type and screen    Collection Time: 01/26/17  2:10 PM   Result Value Ref Range    ABO A     RH(D)  Pos     Antibody Screen Neg     Test Valid Only At       [facility]    Specimen Expires 01/29/2017    CBC with platelets and differential    Collection Time: 01/26/17  2:10 PM   Result Value Ref Range    WBC 1.3 (L) 4.0 - 11.0 10e9/L    RBC Count 3.47 (L) 3.8 - 5.2 10e12/L    Hemoglobin 10.6 (L) 11.7 - 15.7 g/dL    Hematocrit 30.4 (L) 35.0 - 47.0 %    MCV 88 78 - 100 fl    MCH 30.5 26.5 - 33.0 pg    MCHC 34.9 31.5 - 36.5 g/dL    RDW 16.9 (H) 10.0 - 15.0 %    Platelet Count 42 (LL) 150 - 450 10e9/L    Diff Method Pending    Comprehensive metabolic panel (BMP + Alb, Alk Phos, ALT, AST, Total. Bili, TP)    Collection Time: 01/26/17  2:10 PM   Result Value Ref Range    Sodium 138 133 - 144 mmol/L    Potassium 4.3 3.4 - 5.3 mmol/L    Chloride 105 94 - 109 mmol/L    Carbon Dioxide 24 20 - 32 mmol/L    Anion Gap 9 3 - 14 mmol/L    Glucose 175 (H) 70 - 99 mg/dL    Urea Nitrogen 22 7 - 30 mg/dL    Creatinine 0.73 0.52 - 1.04 mg/dL    GFR Estimate 78 >60 mL/min/1.7m2    GFR Estimate If Black >90   GFR Calc   >60 mL/min/1.7m2    Calcium 8.5 8.5 - 10.1 mg/dL    Bilirubin Total 2.2 (H) 0.2 - 1.3 mg/dL    Albumin 3.1 (L) 3.4 - 5.0 g/dL    Protein Total 7.3 6.8 - 8.8 g/dL    Alkaline Phosphatase 112 40 - 150 U/L    ALT 74 (H) 0 - 50 U/L    AST 45 0 - 45 U/L   Bilirubin direct    Collection Time: 01/26/17  2:10 PM   Result Value Ref Range    Bilirubin Direct 1.2 (H) 0.0 - 0.2 mg/dL                       Follow-ups after your visit        Your next 10 appointments already scheduled     Jan 26, 2017  4:00 PM    Infusion 120 with UC ONCOLOGY INFUSION, UC 19 ATC   [facility] ([facility] [place])    [address]  2nd Floor  [facility] [address]   [phone]            Jan 29, 2017  9:00 AM   Level 5 with SOUTH CHAIR 2   [facility] Infusion Center ([facility])    [facility] [place]  [address]  [place] [address]   [phone]            Jan 31, 2017  9:00 AM   Level 5 with SOUTH CHAIR 1   [facility] ([facility])    [facility] [place]  [address]  [place] [address]   [phone]            Feb 02, 2017  9:30 AM   Level 5 with NORTH CHAIR 3   [facility] ([facility])    [facility] [place]  [address]  [place] [address]   [phone]            Feb 02, 2017 11:30 AM   [facility] Lab Draw with UC MASONIC LAB DRAW   [facility] Mason[facility] Lab Draw ([facility])    [address] [facility]  2nd Floor  [facility] [address]   [phone]            Feb 02, 2017 12:00 PM   Infusion 360 with UC ONCOLOGY INFUSION, UC 30 ATC   [facility] ([facility])    [address]  [address] Floor  [facility] [address]   [phone]            Feb 02, 2017  3:30 PM   RETURN ONC with Rafita Rogel MD   [facility] Blood and Marrow Transplant ([facility] [place])    [address]  [address] Floor  [facility] [address]   [phone]            Feb 05, 2017  9:00 AM   Level 5 with SOUTH CHAIR 4   [facility] ([facility])    [facility] [place]  [address]  [place] [address]   [phone]              Future tests that were ordered for you today     Open Standing Orders        Priority Remaining Interval  "Expires Ordered    Platelets prepare order unit Routine 99/100 CONDITIONAL (SPECIFY) BLOOD  2017          Open Future Orders        Priority Expected Expires Ordered    CBC with platelets differential Routine 2017    Cyclosporine Routine 2017    INR Routine 2017    CBC with platelets differential Routine 2017    Comprehensive metabolic panel Routine 2017            Who to contact     If you have questions or need follow up information about today's clinic visit or your schedule please contact [facility] directly at [phone].  Normal or non-critical lab and imaging results will be communicated to you by Packetzoomhart, letter or phone within 4 business days after the clinic has received the results. If you do not hear from us within 7 days, please contact the clinic through Packetzoomhart or phone. If you have a critical or abnormal lab result, we will notify you by phone as soon as possible.  Submit refill requests through LocalGuiding or call your pharmacy and they will forward the refill request to us. Please allow 3 business days for your refill to be completed.          Additional Information About Your Visit        LocalGuiding Information     LocalGuiding lets you send messages to your doctor, view your test results, renew your prescriptions, schedule appointments and more. To sign up, go to [email or web address] . Click on \"Log in\" on the left side of the screen, which will take you to the Welcome page. Then click on \"Sign up Now\" on the right side of the page.     You will be asked to enter the access code listed below, as well as some personal information. Please follow the directions to create your username and password.     Your access code is: [email or web address]  Expires: 3/15/2017 11:47 AM     Your access code will  in 90 days. If you need help or a new code, please call your " [facility] or [phone].        Care EveryWhere ID     This is your Care EveryWhere ID. This could be used by other organizations to access your [place] medical records  [id number]         Blood Pressure from Last 3 Encounters:   01/26/17 91/61   01/25/17 121/69   01/22/17 119/72    Weight from Last 3 Encounters:   01/26/17 60.3 kg (132 lb 15 oz)   01/25/17 60.737 kg (133 lb 14.4 oz)   01/17/17 61.598 kg (135 lb 12.8 oz)              We Performed the Following     ABO/Rh type and screen     Blood component     Platelets prepare order unit          Today's Medication Changes          These changes are accurate as of: 1/26/17  3:54 PM.  If you have any questions, ask your nurse or doctor.               These medicines have changed or have updated prescriptions.        Dose/Directions    predniSONE 10 MG tablet   Commonly known as:  DELTASONE   This may have changed:    - medication strength  - additional instructions   Used for:  Pancytopenia (H), Idiopathic aplastic anemia (H)   Changed by:  Rafita Rogel MD        Dose:  60 mg   Take 6 tablets (60 mg) by mouth daily Or as directed   Quantity:  180 tablet   Refills:  3            Where to get your medicines      These medications were sent to [facility] [address]  [address] [facility] [address], [facility] [address]     Phone:  [phone]    - predniSONE 10 MG tablet      These medications were sent to [facility] - [place], [place] - [address]  [address] [facility] [address]     Phone:  [phone]    - eltrombopag 50 MG tablet             Primary Care Provider Office Phone # Fax #    Rafita Rogel [phone]        [facility] [address] [facility] [address]  [facility] [address]        Thank you!     Thank you for choosing [facility] CANCER [facility]  for your care. Our goal is always to provide you with excellent care. Hearing back from our patients  is one way we can continue to improve our services. Please take a few minutes to complete the written survey that you may receive in the mail after your visit with us. Thank you!             Your Updated Medication List - Protect others around you: Learn how to safely use, store and throw away your medicines at www.disposemymeds.org.          This list is accurate as of: 1/26/17  3:54 PM.  Always use your most recent med list.                   Brand Name Dispense Instructions for use    BENADRYL ALLERGY 25 MG tablet   Generic drug:  diphenhydrAMINE     56 tablet    Take 1 tablet (25 mg) by mouth nightly as needed       chlorpheniramine 4 MG tablet    CHLOR-TRIMETON     Take 4 mg by mouth every 6 hours as needed. For head cold       COMPRESSION STOCKINGS     2 each    Wear compression stockings at 20-30 mmHg rating most time during the day to the affected leg (left leg) or both legs. Take them off at night.       cycloSPORINE modified 100 MG capsule    GENERIC EQUIVALENT    120 capsule    Take 2 capsules (200 mg) by mouth 2 times daily . This reflects a dose change.       diclofenac 1 % Gel topical gel    VOLTAREN    100 g    Apply 2 g topically 2 times daily       eltrombopag 50 MG tablet    PROMACTA    30 tablet    Take 3 tablets (150 mg) by mouth daily Administer on an empty stomach, 1 hour before or 2 hours after a meal. This represents an INCREASE in the dose, as per Dr. Rogel.       fluconazole 200 MG tablet    DIFLUCAN    30 tablet    Take 1 tablet (200 mg) by mouth daily       levofloxacin 500 MG tablet    LEVAQUIN    30 tablet    Take 1 tablet (500 mg) by mouth daily       loratadine 10 MG tablet    CLARITIN     Take 10 mg by mouth daily as needed. For head cold       order for DME     1 Box    Equipment being ordered: knee high compression stockings- 18-20 mm       oxyCODONE 5 MG IR tablet    ROXICODONE    50 tablet    Take 1 tablet (5 mg) by mouth every 6 hours as needed for moderate to severe pain        pantoprazole 40 MG EC tablet    PROTONIX    30 tablet    Take 1 tablet (40 mg) by mouth every morning       polyethylene glycol Packet    MIRALAX/GLYCOLAX    14 packet    Take 17 g by mouth daily . If you start to have loose stools/diarrhea or multiple bowel movements, ok to hold this medication. Then resume if no bowel movement after 24-48hours.       predniSONE 10 MG tablet    DELTASONE    180 tablet    Take 6 tablets (60 mg) by mouth daily Or as directed       SENNA S 8.6-50 MG per tablet   Generic drug:  senna-docusate     100 tablet    Can take 1-2 tablets up to twice a day. Can start with 1-2 tablets daily. If no bowel movement within 24 hours, can take 1-2 tablets twice a day. HOLD if you develop diarrhea. Resume taking if no bowel movement in 24 hours.       TYLENOL PO      Take 325 mg by mouth every 6 hours as needed for mild pain or fever

## 2017-01-26 NOTE — PROGRESS NOTES
BP 91/61 mmHg  Pulse 104  Temp(Src) 98.6  F (37  C) (Axillary)  Resp 18  Wt 60.3 kg (132 lb 15 oz)  SpO2 97%  Wt Readings from Last 4 Encounters:   01/26/17 60.3 kg (132 lb 15 oz)   01/25/17 60.737 kg (133 lb 14.4 oz)   01/17/17 61.598 kg (135 lb 12.8 oz)   01/13/17 64.638 kg (142 lb 8 oz)     Severe Aplastic anemia  Just OOH for hemorrhoidal or LGI bleeding. Given transfusion of RBC/plts.  NO major bleed recognized  Post ATG/CSA/Steroids/Eltrombopag for aplasia therpay early January.    No bleeding overnight  Some lightheaded ness on arrival with mild orthostatic BP; better post IV saline.  Trace blood/pink on toilet paper. NO visible rectal bleeding.  No oral, nasal,  bleeding  No fcs. No pain.  Eating ok since hosp discharge.    PHYSICAL EXAMINATION:   VITAL SIGNS:  Her exam shows acceptable vital signs.   LYMPH:  She has no peripheral lymphadenopathy.   EXTREMITIES:  She has no peripheral edema.   SKIN:  She has some rare petechiae in her lower extremities and her arms but they are not fresh.   HEENT:  Her oropharynx is clear without mucosal lesions.  She has no conjunctival hemorrhage.   LUNGS:  Clear.   CARDIOVASCULAR:  Heart tones are regular with a soft ejection murmur.   ABDOMEN:  Soft and nontender without hepatosplenomegaly or masses.      Labs;  Chemistry ok  CBC stable since yesterday.    IMpr;  Taking ongoing immunosuppression for aplasia   BID, pred 60mg/d; eltrombopag 150 mg/day    Cont and monitor csa levels targetting trough level 200-350.  If no changes will decrease pred to 40 mg/day next week.   Will taper slowly; only reducing by 10mg every other week.    Heme;    Transfuse for Hb <8  Plts <30,000  Conditional orders in place.    FEN   Ok. Monitor creatinine and BP while on CSA    GI/Nutrition.  Had some loose stools;   Better. Monitor external signs of GI bleed.    Plan:  Home care checkup tomorrow    Sunday   To  [place] for CBC and possible transfusion    TU[place]day to To   [place] for CBC and possible transfusion; and CSA level    Thursday   RTC to [facility] for vist and labs and possible transfusion.    Pt advised about phone contacts for help/advice.  Also advised how to call for financial assistance in ongoing eltrombopag therapy.    Rafita Rogel MD  Professor of Medicine    Results for LISSETH PARIKH (MRN [id number]) as of 1/26/2017 17:18   Ref. Range 1/25/2017 06:31 1/25/2017 08:37 1/26/2017 01:00 1/26/2017 14:10   Sodium Latest Ref Range: 133-144 mmol/L 140   138   Potassium Latest Ref Range: 3.4-5.3 mmol/L 4.0   4.3   Chloride Latest Ref Range:  mmol/L 107   105   Carbon Dioxide Latest Ref Range: 20-32 mmol/L 23   24   Urea Nitrogen Latest Ref Range: 7-30 mg/dL 18   22   Creatinine Latest Ref Range: 0.52-1.04 mg/dL 0.67   0.73   GFR Estimate Latest Ref Range: >60 mL/min/1.7m2 86   78   GFR Estimate If Black Latest Ref Range: >60 mL/min/1.7m2 >90...   >90...   Calcium Latest Ref Range: 8.5-10.1 mg/dL 7.8 (L)   8.5   Anion Gap Latest Ref Range: 3-14 mmol/L 10   9   Magnesium Latest Ref Range: 1.6-2.3 mg/dL 1.4 (L)      Phosphorus Latest Ref Range: 2.5-4.5 mg/dL 2.5      Albumin Latest Ref Range: 3.4-5.0 g/dL 2.5 (L)   3.1 (L)   Protein Total Latest Ref Range: 6.8-8.8 g/dL 6.0 (L)   7.3   Bilirubin Total Latest Ref Range: 0.2-1.3 mg/dL 2.1 (H)   2.2 (H)   Alkaline Phosphatase Latest Ref Range:  U/L 84   112   ALT Latest Ref Range: 0-50 U/L 41   74 (H)   AST Latest Ref Range: 0-45 U/L 10   45   Bilirubin Direct Latest Ref Range: 0.0-0.2 mg/dL 1.0 (H)   1.2 (H)   Glucose Latest Ref Range: 70-99 mg/dL 78   175 (H)   WBC Latest Ref Range: 4.0-11.0 10e9/L 2.7 (L)   1.3 (L)   Hemoglobin Latest Ref Range: 11.7-15.7 g/dL 8.9 (L)   10.6 (L)   Hematocrit Latest Ref Range: 35.0-47.0 % 26.4 (L)   30.4 (L)   Platelet Count Latest Ref Range: 150-450 10e9/L 28 (LL)   42 (LL)   RBC Count Latest Ref Range: 3.8-5.2 10e12/L 3.03 (L)   3.47 (L)   MCV Latest Ref Range:  fl 87   88    MCH Latest Ref Range: 26.5-33.0 pg 29.4   30.5   MCHC Latest Ref Range: 31.5-36.5 g/dL 33.7   34.9   RDW Latest Ref Range: 10.0-15.0 % 17.7 (H)   16.9 (H)   Diff Method Unknown Automated Method   Pending   % Neutrophils Latest Units: % 11.5      % Lymphocytes Latest Units: % 87.0      % Monocytes Latest Units: % 1.1      % Eosinophils Latest Units: % 0.4      % Basophils Latest Units: % 0.0      % Immature Granulocytes Latest Units: % 0.0      Nucleated RBCs Latest Ref Range: 0 /100 1 (H)      Absolute Neutrophil Latest Ref Range: 1.6-8.3 10e9/L 0.3 (LL)      Absolute Lymphocytes Latest Ref Range: 0.8-5.3 10e9/L 2.3      Absolute Monocytes Latest Ref Range: 0.0-1.3 10e9/L 0.0      Absolute Eosinophils Latest Ref Range: 0.0-0.7 10e9/L 0.0      Absolute Basophils Latest Ref Range: 0.0-0.2 10e9/L 0.0      Abs Immature Granulocytes Latest Ref Range: 0-0.4 10e9/L 0.0      Absolute Nucleated RBC Unknown 0.0      Platelet Estimate Unknown Confirming automa...      ABO Unknown    A   RH(D) Unknown    Pos   Antibody Screen Unknown    Neg   Test Valid Only At [facility]...   Specimen Expires Unknown    01/29/2017   Ordered Component Type Unknown  PLT Pheresis PLT Pheresis    Blood Component Type Unknown  PlateletPheresis,... PlateletPheresis,...    Unit Number Unknown  [id number]    Division Number Unknown  00 00    Status of Unit Unknown  Released to care ... Ready for patient...    Unit Status Unknown  ISS [facility]

## 2017-01-26 NOTE — PATIENT INSTRUCTIONS
Contact Numbers    [facility] [place]: [phone]  [facility] [place]:  [phone]    Call triage with chills and/or temperature greater than or equal to 100.5, uncontrolled nausea/vomiting, diarrhea, constipation, dizziness, shortness of breath, chest pain, bleeding, unexplained bruising, or any new/concerning symptoms, questions/concerns.     If you are having any concerning symptoms or wish to speak to a provider before your next infusion visit, please call your care coordinator or triage to notify them so we can adequately serve you.       After Hours: [phone]    If after hours, weekends, or holidays, call main hospital  and ask for Oncology doctor on call.           January 2017 Sunday Monday Tuesday Wednesday Thursday Friday Saturday   1     2     3     4     Outpatient Visit    8:25 AM   Manpreet Mcdaniel MD   [facility]     LEVEL 0    2:00 PM   (30 min.)   NORTH LAB 2   [facility] and Infusion Center     RETURN    2:45 PM   (15 min.)   Manpreet Mcdaniel MD   [facility] 5     Outpatient Visit    8:54 AM   [facility]     LEVEL 4   10:00 AM   (240 min.)   SOUTH CHAIR 6   [facility] and Infusion Center     [facility] ONC RETURN    2:00 PM   (30 min.)   Rafita Rogel MD   [facility] Blood and Marrow Transplant 6     7       8     9     IR PICC PLCMT > 5 YRS LEFT    9:00 AM   (60 min.)   [id number]   [facility], Vascular Access     Admission    9:58 AM   Merritt Murray MD   Unit 7D [facility]   (Discharge: 1/13/2017)     XR CHEST PORT 1 VIEW    9:35 PM   (20 min.)   UUXR1   [facility],  Radiology 10     XR CHEST PORT 1 VIEW   12:15 PM   (20 min.)   [id number]   [facility],  Radiology 11     12     13     UU CENTRAL VENOUS CATHETER    1:00 PM   (60 min.)   Shivani Hess, RN   [facility], [facility] 14       15     LEVEL 4    8:00 AM   (240 min.)   SOUTH CHAIR 8   [facility] and Infusion Center     Outpatient  Visit    9:56 AM   Susan Root PA-C   [facility] Lab 16     17     [facility] MASONIC LAB DRAW   12:00 PM   (15 min.)    MASONIC LAB DRAW   [facility] Masonic Lab Draw     P RETURN   12:30 PM   (50 min.)   Celine Walls PA-C   [facility] ONC INFUSION 120    3:30 PM   (120 min.)    ONCOLOGY INFUSION   [facility] 18     19     Outpatient Visit    8:22 AM   Man[facility]cd[facility]iel [facility]     US LWR EXT VENOUS DUPLEX RIGHT    9:10 AM   (30 min.)   SHUS5   [facility] Ultrasound     LEVEL 5   10:00 AM   (300 min.)   NORTH CHAIR 3   [facility] and Infusion Center 20     Outpatient Visit    8:11 AM   Manpreet Mcdaniel MD [facility]     LEVEL 0    8:30 AM   (30 min.)   NORTH CHAIR 6   [facility] and Infusion Center 21       22     Outpatient Visit    8:35 AM   Manpreet Mcdaniel MD [facility] Lab     LEVEL 5    9:00 AM   (300 min.)   SOUTH CHAIR 2   [facility] and Infusion Center 23     Admission    9:31 AM   Laisha Joseph MD   Unit 7D [facility]   (Discharge: 1/25/2017)     XR CHEST 2 VIEWS   10:35 AM   (15 min.)   UUXR1   [facility],  Radiology     XR CHEST PORT 1 VIEW    1:15 PM   (20 min.)   [id number]   [facility],  Radiology 24     IP EVALUATION    6:00 AM   (60 min.)   Brittaney Collins, PT   [facility], Physical Therapy 25     IP TREATMENT    6:00 AM   (30 min.)   Kathy Redman, PT   [facility], Physical Therapy 26     [facility] MASONIC LAB DRAW    2:30 PM   (15 min.)    MASONIC LAB DRAW   [facility] Lab Draw     [facility] ONC RETURN    3:00 PM   (30 min.)   Rafita Rogel MD   [facility] Blood and Marrow Transplant     [facility] ONC INFUSION 120    4:00 PM   (120 min.)    ONCOLOGY INFUSION   [facility] 27     28       29     LEVEL 5    9:00 AM   (300 min.)   SOUTH CHAIR 2   [facility] and Infusion Center 30     31      LEVEL 5    9:00 AM   (300 min.)   SOUTH CHAIR 1   [facility] Cancer [facility] and Infusion Center                                February 2017 Sunday Monday Tuesday Wednesday Thursday Friday Saturday                  1     2     LEVEL 5    9:30 AM   (300 min.)   NORTH CHAIR 3   [facility] and Infusion Center     [facility] MASONIC LAB DRAW   11:30 AM   (15 min.)    MASONIC LAB DRAW   [facility] Masonic Lab Draw     P ONC INFUSION 360   12:00 PM   (360 min.)    ONCOLOGY INFUSION   [facility]     [facility] ONC RETURN    3:30 PM   (30 min.)   Rafita Rogel MD   [facility] Blood and Marrow Transplant 3     4       5     LEVEL 5    9:00 AM   (300 min.)   SOUTH CHAIR 4   [facility] and Infusion Center 6     7     LEVEL 5    9:00 AM   (300 min.)   SOUTH CHAIR 2   [facility] and Infusion Center 8     9     10     [facility]IC LAB DRAW    9:00 AM   (15 min.)    MASONIC LAB DRAW   [facility] Masonic Lab Draw     [facility] RETURN    9:30 AM   (50 min.)   [facility]-C   [facility]   10:30 AM   (360 min.)    ONCOLOGY INFUSION   [facility] 11       12     13     14     15     16     17     18       19     20     21     22     23     24     25       26     27     28                                     Recent Results (from the past 24 hour(s))   Platelets prepare order unit    Collection Time: 01/26/17  1:00 AM   Result Value Ref Range    Ordered Component Type PLT Pheresis     Units Ordered 1    Blood component    Collection Time: 01/26/17  1:00 AM   Result Value Ref Range    Unit Number [id number]     Blood Component Type PlateletPheresis,LeukoRed Irrad (Part 2)     Division Number 00     Status of Unit Ready for patient 01/26/2017 0729     Blood Product Code [id number]     Unit Status GENEVA    ABO/Rh type and screen    Collection Time: 01/26/17  2:10 PM   Result Value Ref Range    ABO A     RH(D)  Pos      Antibody Screen Neg     Test Valid Only At       [facility]    Specimen Expires 01/29/2017    CBC with platelets and differential    Collection Time: 01/26/17  2:10 PM   Result Value Ref Range    WBC 1.3 (L) 4.0 - 11.0 10e9/L    RBC Count 3.47 (L) 3.8 - 5.2 10e12/L    Hemoglobin 10.6 (L) 11.7 - 15.7 g/dL    Hematocrit 30.4 (L) 35.0 - 47.0 %    MCV 88 78 - 100 fl    MCH 30.5 26.5 - 33.0 pg    MCHC 34.9 31.5 - 36.5 g/dL    RDW 16.9 (H) 10.0 - 15.0 %    Platelet Count 42 (LL) 150 - 450 10e9/L    Diff Method Pending    Comprehensive metabolic panel (BMP + Alb, Alk Phos, ALT, AST, Total. Bili, TP)    Collection Time: 01/26/17  2:10 PM   Result Value Ref Range    Sodium 138 133 - 144 mmol/L    Potassium 4.3 3.4 - 5.3 mmol/L    Chloride 105 94 - 109 mmol/L    Carbon Dioxide 24 20 - 32 mmol/L    Anion Gap 9 3 - 14 mmol/L    Glucose 175 (H) 70 - 99 mg/dL    Urea Nitrogen 22 7 - 30 mg/dL    Creatinine 0.73 0.52 - 1.04 mg/dL    GFR Estimate 78 >60 mL/min/1.7m2    GFR Estimate If Black >90   GFR Calc   >60 mL/min/1.7m2    Calcium 8.5 8.5 - 10.1 mg/dL    Bilirubin Total 2.2 (H) 0.2 - 1.3 mg/dL    Albumin 3.1 (L) 3.4 - 5.0 g/dL    Protein Total 7.3 6.8 - 8.8 g/dL    Alkaline Phosphatase 112 40 - 150 U/L    ALT 74 (H) 0 - 50 U/L    AST 45 0 - 45 U/L   Bilirubin direct    Collection Time: 01/26/17  2:10 PM   Result Value Ref Range    Bilirubin Direct 1.2 (H) 0.0 - 0.2 mg/dL

## 2017-01-26 NOTE — PROGRESS NOTES
Clinic Care Coordination Contact  Care Team Conversations    Notification received that patient was discharged from [facility] yesterday back home with [employer]  Per chart review her PCP was changed to Dr. Rogel who is her hematologist.    Plan:  I outreached to Светлана Nolasco RN who is her hematology care coordinator so we can discuss who will follow up with patient.  Will wait to hear back.  I will follow up in 2-3 business days.      JACQUELYN Ho, RN, PHN  [facility] Care Coordinator  [phone]  January 26, 2017

## 2017-01-27 NOTE — PROGRESS NOTES
Infusion Nursing Note:  Bonny Raymundo presents today for IV fluids.    Patient seen by provider today: Yes: Dr. Rogel in infusion room today   present during visit today: Not Applicable.    Note: NS 1 Liter given IV today per Dr. Rogel due to orthostatic hypotension in lab today.    Intravenous Access:  PICC.    Treatment Conditions:  HGB     10.6   1/26/2017  WBC      1.3   1/26/2017   ANEU      0.4   1/26/2017  PLT       42   1/26/2017     NA      138   1/26/2017                POTASSIUM      4.3   1/26/2017        MAG      1.4   1/25/2017         CR     0.73   1/26/2017                LEO      8.5   1/26/2017             BILITOTAL      2.2   1/26/2017        ALBUMIN      3.1   1/26/2017                 ALT       74   1/26/2017        AST       45   1/26/2017  Results reviewed, labs did NOT meet treatment parameters for transfusions today.      Post Infusion Assessment:  Patient tolerated infusion without incident.  Blood return noted pre and post infusion.  Site patent and intact, free from redness, edema or discomfort.  No evidence of extravasations.    Discharge Plan:   Patient declined prescription refills.  Copy of AVS reviewed with patient and/or family.  Patient will return 1/29 to [facility] for next appointment (labs and possible transfusions).  Patient discharged in stable condition accompanied by: self and ride.  Departure Mode: Wheelchair.    LAZARO PIERRE RN

## 2017-01-27 NOTE — PROGRESS NOTES
Clinic Care Coordination Contact  Care Team Conversations    Update received from ANABELLE Sotomayor CC with Dr. Rogel.  Se said patient was seen in clinic. Светлана said patient has been set up with everything she needs.  She said patient will primarily be seeing Dr. Louie for now but once she is stable post treatment she will probably return to a clinic closer to home.    Plan:  Hematology CC to follow.  I will not make any further outreaches at this time.  She can be referred back to care coordination if need when she returns to [facility].    JACQUELYN Ho, RN, PHN  A Care Coordinator  [phone]  January 27, 2017

## 2017-01-29 ENCOUNTER — HOSPITAL ENCOUNTER (OUTPATIENT)
Facility: CLINIC | Age: 72
Setting detail: SPECIMEN
Discharge: HOME | End: 2017-01-29
Attending: INTERNAL MEDICINE | Admitting: INTERNAL MEDICINE
Payer: COMMERCIAL

## 2017-01-29 ENCOUNTER — INFUSION THERAPY VISIT (OUTPATIENT)
Dept: INFUSION THERAPY | Facility: CLINIC | Age: 72
End: 2017-01-29
Attending: PHYSICIAN ASSISTANT
Payer: COMMERCIAL

## 2017-01-29 VITALS
HEART RATE: 77 BPM | RESPIRATION RATE: 16 BRPM | DIASTOLIC BLOOD PRESSURE: 87 MMHG | SYSTOLIC BLOOD PRESSURE: 130 MMHG | TEMPERATURE: 97 F | OXYGEN SATURATION: 97 %

## 2017-01-29 DIAGNOSIS — D61.818 PANCYTOPENIA (H): Primary | ICD-10-CM

## 2017-01-29 LAB
BASOPHILS # BLD AUTO: 0 10E9/L (ref 0–0.2)
BASOPHILS NFR BLD AUTO: 0 %
BLD PROD TYP BPU: NORMAL
BLD PROD TYP BPU: NORMAL
BLD UNIT ID BPU: 0
BLOOD PRODUCT CODE: NORMAL
BPU ID: NORMAL
DIFFERENTIAL METHOD BLD: ABNORMAL
EOSINOPHIL # BLD AUTO: 0 10E9/L (ref 0–0.7)
EOSINOPHIL NFR BLD AUTO: 0.3 %
ERYTHROCYTE [DISTWIDTH] IN BLOOD BY AUTOMATED COUNT: 16.5 % (ref 10–15)
HCT VFR BLD AUTO: 29.1 % (ref 35–47)
HGB BLD-MCNC: 10.2 G/DL (ref 11.7–15.7)
IMM GRANULOCYTES # BLD: 0 10E9/L (ref 0–0.4)
IMM GRANULOCYTES NFR BLD: 0 %
LYMPHOCYTES # BLD AUTO: 2.7 10E9/L (ref 0.8–5.3)
LYMPHOCYTES NFR BLD AUTO: 88.3 %
MCH RBC QN AUTO: 30.4 PG (ref 26.5–33)
MCHC RBC AUTO-ENTMCNC: 35.1 G/DL (ref 31.5–36.5)
MCV RBC AUTO: 87 FL (ref 78–100)
MONOCYTES # BLD AUTO: 0.1 10E9/L (ref 0–1.3)
MONOCYTES NFR BLD AUTO: 2.3 %
NEUTROPHILS # BLD AUTO: 0.3 10E9/L (ref 1.6–8.3)
NEUTROPHILS NFR BLD AUTO: 9.1 %
NRBC # BLD AUTO: 0 10*3/UL
NRBC BLD AUTO-RTO: 1 /100
NUM BPU REQUESTED: 1
PLATELET # BLD AUTO: 17 10E9/L (ref 150–450)
RBC # BLD AUTO: 3.35 10E12/L (ref 3.8–5.2)
TRANSFUSION STATUS PATIENT QL: NORMAL
TRANSFUSION STATUS PATIENT QL: NORMAL
WBC # BLD AUTO: 3.1 10E9/L (ref 4–11)

## 2017-01-29 PROCEDURE — 36430 TRANSFUSION BLD/BLD COMPNT: CPT

## 2017-01-29 PROCEDURE — P9037 PLATE PHERES LEUKOREDU IRRAD: HCPCS | Performed by: PHYSICIAN ASSISTANT

## 2017-01-29 PROCEDURE — 85025 COMPLETE CBC W/AUTO DIFF WBC: CPT | Performed by: INTERNAL MEDICINE

## 2017-01-29 ASSESSMENT — PAIN SCALES - GENERAL: PAINLEVEL: MILD PAIN (3)

## 2017-01-29 NOTE — MR AVS SNAPSHOT
After Visit Summary   1/29/2017    Bonny Raymundo    MRN: [id number]           Patient Information     Date Of Birth          1945        Visit Information        Provider Department      1/29/2017 9:00 AM SOUTH CHAIR 2 [facility] Infusion Center        Today's Diagnoses     Pancytopenia (H)    -  1        Follow-ups after your visit        Your next 10 appointments already scheduled     Jan 31, 2017  9:00 AM   Level 5 with SOUTH CHAIR 1   [facility] Infusion [facility] ([facility])    [facility] [place]  [address]  [place] [address]   [phone]            Feb 02, 2017 11:30 AM   Masonic Lab Draw with  MASONIC LAB DRAW   [facility] Masonic Lab Draw ([facility])    [address] [place] [address]   [phone]            Feb 02, 2017 12:00 PM   Infusion 360 with UC ONCOLOGY INFUSION, UC 30 ATC   [facility] ([facility])    [address] [place] [address]   [phone]            Feb 02, 2017  3:30 PM   RETURN ONC with Rafita Rogel MD   [facility] Blood and Marrow Transplant ([facility])    [address] [place] [address]   [phone]            Feb 05, 2017  9:00 AM   Level 5 with SOUTH CHAIR 4   [facility] ([facility])    [facility] [place]  [address]  [place] [address]   [phone]            Feb 07, 2017  9:00 AM   Level 5 with SOUTH CHAIR 2   [facility] ([facility])    [facility] [place]  [address]  [place] [address]   [phone]            Feb 10, 2017  9:00 AM   Masonic Lab Draw with UC MASONIC LAB DRAW   [facility] Masonic Lab Draw ([facility])  "   [address] [address]   [phone]            Feb 10, 2017  9:30 AM   (Arrive by 9:15 AM)   Return Visit with Celine Walls PA-C   [facility] ([facility])    [address] [place] [address]   [phone]            Feb 10, 2017 10:30 AM   Infusion 360 with UC ONCOLOGY INFUSION   [facility] ([facility])    [address] [place] [address]   [phone]              Future tests that were ordered for you today     Open Standing Orders        Priority Remaining Interval Expires Ordered    Platelets prepare order unit Routine 99/100 CONDITIONAL (SPECIFY) BLOOD  1/29/2017    Transfuse platelets unit Routine 99/100 TRANSFUSE 1 DOSE  1/29/2017            Who to contact     If you have questions or need follow up information about today's clinic visit or your schedule please contact [facility] CAN[facility]ER [facility] AND INFUSION CENTER directly at [phone].  Normal or non-critical lab and imaging results will be communicated to you by [employer], letter or phone within 4 business days after the clinic has received the results. If you do not hear from us within 7 days, please contact the clinic through [employer] or phone. If you have a critical or abnormal lab result, we will notify you by phone as soon as possible.  Submit refill requests through [employer] or call your pharmacy and they will forward the refill request to us. Please allow 3 business days for your refill to be completed.          Additional Information About Your Visit        [employer] Information     [employer] lets you send messages to your doctor, view your test results, renew your prescriptions, schedule appointments and more. To sign up, go to [email or web address] . Click on \"Log in\" on the left side of the screen, which will take you to the Welcome page. Then click on \"Sign up " "Now\" on the right side of the page.     You will be asked to enter the access code listed below, as well as some personal information. Please follow the directions to create your username and password.     Your access code is: [email or web address]  Expires: 3/15/2017 11:47 AM     Your access code will  in 90 days. If you need help or a new code, please call your [facility] or [phone].        Care EveryWhere ID     This is your Care EveryWhere ID. This could be used by other organizations to access your [place] medical records  [id number]        Your Vitals Were     Pulse Temperature Respirations Pulse Oximetry          77 97  F (36.1  C) (Oral) 16 97%         Blood Pressure from Last 3 Encounters:   17 130/87   17 134/74   17 91/61    Weight from Last 3 Encounters:   17 60.3 kg (132 lb 15 oz)   17 60.737 kg (133 lb 14.4 oz)   17 61.598 kg (135 lb 12.8 oz)              We Performed the Following     Blood component     CBC with platelets differential     Obtain affirmation of informed consent     Platelets prepare order unit     Transfuse platelets unit     Treatment Conditions        Primary Care Provider Office Phone # Fax #    Rafita Rogel [phone]        [facility] [address] [facility] [address]  [facility] [address]        Thank you!     Thank you for choosing [facility] CANCER [facility] AND [facility] CENTER  for your care. Our goal is always to provide you with excellent care. Hearing back from our patients is one way we can continue to improve our services. Please take a few minutes to complete the written survey that you may receive in the mail after your visit with us. Thank you!             Your Updated Medication List - Protect others around you: Learn how to safely use, store and throw away your medicines at www.disposemymeds.org.          This list is accurate as of: 17 11:21 AM.  Always use your most recent med list.                "    Brand Name Dispense Instructions for use    BENADRYL ALLERGY 25 MG tablet   Generic drug:  diphenhydrAMINE     56 tablet    Take 1 tablet (25 mg) by mouth nightly as needed       chlorpheniramine 4 MG tablet    CHLOR-TRIMETON     Take 4 mg by mouth every 6 hours as needed. For head cold       COMPRESSION STOCKINGS     2 each    Wear compression stockings at 20-30 mmHg rating most time during the day to the affected leg (left leg) or both legs. Take them off at night.       cycloSPORINE modified 100 MG capsule    GENERIC EQUIVALENT    120 capsule    Take 2 capsules (200 mg) by mouth 2 times daily . This reflects a dose change.       diclofenac 1 % Gel topical gel    VOLTAREN    100 g    Apply 2 g topically 2 times daily       eltrombopag 50 MG tablet    PROMACTA    30 tablet    Take 3 tablets (150 mg) by mouth daily Administer on an empty stomach, 1 hour before or 2 hours after a meal. This represents an INCREASE in the dose, as per Dr. Rogel.       fluconazole 200 MG tablet    DIFLUCAN    30 tablet    Take 1 tablet (200 mg) by mouth daily       levofloxacin 500 MG tablet    LEVAQUIN    30 tablet    Take 1 tablet (500 mg) by mouth daily       loratadine 10 MG tablet    CLARITIN     Take 10 mg by mouth daily as needed. For head cold       order for DME     1 Box    Equipment being ordered: knee high compression stockings- 18-20 mm       oxyCODONE 5 MG IR tablet    ROXICODONE    50 tablet    Take 1 tablet (5 mg) by mouth every 6 hours as needed for moderate to severe pain       pantoprazole 40 MG EC tablet    PROTONIX    30 tablet    Take 1 tablet (40 mg) by mouth every morning       polyethylene glycol Packet    MIRALAX/GLYCOLAX    14 packet    Take 17 g by mouth daily . If you start to have loose stools/diarrhea or multiple bowel movements, ok to hold this medication. Then resume if no bowel movement after 24-48hours.       predniSONE 10 MG tablet    DELTASONE    180 tablet    Take 6 tablets (60 mg) by mouth  daily Or as directed       SENNA S 8.6-50 MG per tablet   Generic drug:  senna-docusate     100 tablet    Can take 1-2 tablets up to twice a day. Can start with 1-2 tablets daily. If no bowel movement within 24 hours, can take 1-2 tablets twice a day. HOLD if you develop diarrhea. Resume taking if no bowel movement in 24 hours.       TYLENOL PO      Take 325 mg by mouth every 6 hours as needed for mild pain or fever

## 2017-01-29 NOTE — PROGRESS NOTES
Infusion Nursing Note:  Bonny Raymundo presents today for labs, possible transfusion.    Patient seen by provider today: No   present during visit today: Not Applicable.    Note: Patient feeling well overall, denies any signs of bleeding. Denies SOB or dizziness. No new concerns.     Intravenous Access:  Labs drawn without difficulty.  PICC. Caps changed and dressing changed per sterile protocol. Excellent blood return noted on gray and purple lumens.    Treatment Conditions:  HGB     10.2   1/29/2017  WBC      3.1   1/29/2017   ANEU      0.3   1/29/2017  PLT       17   1/29/2017     Results reviewed, labs MET treatment parameters for 1 dose platelets, ok to proceed with treatment.  Blood transfusion consent signed 1/9/17.      Post Infusion Assessment:  Patient tolerated infusion without incident.  Blood return noted pre and post infusion.  Site patent and intact, free from redness, edema or discomfort.  No evidence of extravasations.    Discharge Plan:   Discharge instructions reviewed with: Patient.  Patient and/or family verbalized understanding of discharge instructions and all questions answered.  Copy of AVS reviewed with patient and/or family.  Patient will return 1/31 for next appointment.  Patient discharged in stable condition accompanied by: self.  Departure Mode: Ambulatory.    Demetrice Connolly RN

## 2017-01-31 ENCOUNTER — INFUSION THERAPY VISIT (OUTPATIENT)
Dept: INFUSION THERAPY | Facility: CLINIC | Age: 72
End: 2017-01-31
Attending: PHYSICIAN ASSISTANT
Payer: COMMERCIAL

## 2017-01-31 ENCOUNTER — TELEPHONE (OUTPATIENT)
Dept: TRANSPLANT | Facility: CLINIC | Age: 72
End: 2017-01-31

## 2017-01-31 ENCOUNTER — HOSPITAL ENCOUNTER (OUTPATIENT)
Facility: CLINIC | Age: 72
Setting detail: SPECIMEN
Discharge: HOME | End: 2017-01-31
Attending: PHYSICIAN ASSISTANT | Admitting: INTERNAL MEDICINE
Payer: COMMERCIAL

## 2017-01-31 VITALS
TEMPERATURE: 97.6 F | HEART RATE: 74 BPM | RESPIRATION RATE: 16 BRPM | DIASTOLIC BLOOD PRESSURE: 78 MMHG | SYSTOLIC BLOOD PRESSURE: 133 MMHG

## 2017-01-31 DIAGNOSIS — D61.9 APLASTIC ANEMIA (H): ICD-10-CM

## 2017-01-31 DIAGNOSIS — D61.818 PANCYTOPENIA (H): ICD-10-CM

## 2017-01-31 DIAGNOSIS — D61.3 IDIOPATHIC APLASTIC ANEMIA (H): Primary | ICD-10-CM

## 2017-01-31 LAB
BASOPHILS # BLD AUTO: 0 10E9/L (ref 0–0.2)
BASOPHILS NFR BLD AUTO: 0 %
BLD PROD TYP BPU: NORMAL
BLD PROD TYP BPU: NORMAL
BLD UNIT ID BPU: 0
BLOOD PRODUCT CODE: NORMAL
BPU ID: NORMAL
CYCLOSPORINE BLD LC/MS/MS-MCNC: 455 UG/L (ref 50–400)
DIFFERENTIAL METHOD BLD: ABNORMAL
EOSINOPHIL # BLD AUTO: 0 10E9/L (ref 0–0.7)
EOSINOPHIL NFR BLD AUTO: 0.3 %
ERYTHROCYTE [DISTWIDTH] IN BLOOD BY AUTOMATED COUNT: 16.3 % (ref 10–15)
HCT VFR BLD AUTO: 26.6 % (ref 35–47)
HGB BLD-MCNC: 9.3 G/DL (ref 11.7–15.7)
IMM GRANULOCYTES # BLD: 0 10E9/L (ref 0–0.4)
IMM GRANULOCYTES NFR BLD: 0 %
LYMPHOCYTES # BLD AUTO: 3.2 10E9/L (ref 0.8–5.3)
LYMPHOCYTES NFR BLD AUTO: 87.3 %
MCH RBC QN AUTO: 30.3 PG (ref 26.5–33)
MCHC RBC AUTO-ENTMCNC: 35 G/DL (ref 31.5–36.5)
MCV RBC AUTO: 87 FL (ref 78–100)
MONOCYTES # BLD AUTO: 0.1 10E9/L (ref 0–1.3)
MONOCYTES NFR BLD AUTO: 2.2 %
NEUTROPHILS # BLD AUTO: 0.4 10E9/L (ref 1.6–8.3)
NEUTROPHILS NFR BLD AUTO: 10.2 %
NRBC # BLD AUTO: 0 10*3/UL
NRBC BLD AUTO-RTO: 1 /100
NUM BPU REQUESTED: 1
PLATELET # BLD AUTO: 12 10E9/L (ref 150–450)
RBC # BLD AUTO: 3.07 10E12/L (ref 3.8–5.2)
TME LAST DOSE: ABNORMAL H
TRANSFUSION STATUS PATIENT QL: NORMAL
TRANSFUSION STATUS PATIENT QL: NORMAL
WBC # BLD AUTO: 3.6 10E9/L (ref 4–11)

## 2017-01-31 PROCEDURE — P9037 PLATE PHERES LEUKOREDU IRRAD: HCPCS | Performed by: PHYSICIAN ASSISTANT

## 2017-01-31 PROCEDURE — 85025 COMPLETE CBC W/AUTO DIFF WBC: CPT | Performed by: INTERNAL MEDICINE

## 2017-01-31 PROCEDURE — 80158 DRUG ASSAY CYCLOSPORINE: CPT | Performed by: INTERNAL MEDICINE

## 2017-01-31 PROCEDURE — 36430 TRANSFUSION BLD/BLD COMPNT: CPT

## 2017-01-31 ASSESSMENT — PAIN SCALES - GENERAL: PAINLEVEL: MILD PAIN (3)

## 2017-01-31 NOTE — PROGRESS NOTES
Infusion Nursing Note:  Bonny Raymundo presents today for labs, possible blood transfusion  Patient seen by provider today: No   present during visit today: Not Applicable.    Note: NA.    Intravenous Access:  PICC.    Treatment Conditions:  HGB      9.3   1/31/2017  WBC      3.6   1/31/2017   ANEU      0.4   1/31/2017  PLT       12   1/31/2017     Results reviewed, labs MET treatment parameters, ok to proceed with treatment. One dose platelets only today.      Post Infusion Assessment:  Patient tolerated infusion without incident.    Discharge Plan:   Patient and/or family verbalized understanding of discharge instructions and all questions answered.  AVS to patient via flikdate.  Patient will return Sunday for next appointment.   Patient discharged in stable condition accompanied by: self.    Suze Thomas RN

## 2017-02-01 ENCOUNTER — CARE COORDINATION (OUTPATIENT)
Dept: ONCOLOGY | Facility: CLINIC | Age: 72
End: 2017-02-01

## 2017-02-01 LAB
BLD PROD TYP BPU: NORMAL
NUM BPU REQUESTED: 1

## 2017-02-01 NOTE — TELEPHONE ENCOUNTER
Paged regarding high CSA level of 455.  Called patient to hold tomorrow mornings dose until she can speak with the BMT clinic in the morning.

## 2017-02-01 NOTE — PROGRESS NOTES
Patient called in this morning d/t a phone call she received last night stating that her CSA level was at 455 and she was to hold her AM dose and call into clinic.  Confirmed with patient that she has been taking the 200 MG BID dose of CSA and that she indeed did HOLD her dose prior to her lab draw yesterday.  Called Dr. Rogel who would like her to reduce her dose of CSA this evening to 175 MG and then continue with the  MG BID.  Patient verbalized understanding of new dosing. Reviewed tomorrow's schedule with her and she is aware and will be to scheduled appointments.

## 2017-02-02 ENCOUNTER — APPOINTMENT (OUTPATIENT)
Dept: LAB | Facility: CLINIC | Age: 72
End: 2017-02-02
Attending: INTERNAL MEDICINE
Payer: COMMERCIAL

## 2017-02-02 ENCOUNTER — OFFICE VISIT (OUTPATIENT)
Dept: TRANSPLANT | Facility: CLINIC | Age: 72
End: 2017-02-02
Attending: INTERNAL MEDICINE
Payer: COMMERCIAL

## 2017-02-02 ENCOUNTER — INFUSION THERAPY VISIT (OUTPATIENT)
Dept: ONCOLOGY | Facility: CLINIC | Age: 72
End: 2017-02-02
Attending: INTERNAL MEDICINE
Payer: COMMERCIAL

## 2017-02-02 VITALS
BODY MASS INDEX: 23.57 KG/M2 | RESPIRATION RATE: 16 BRPM | HEART RATE: 83 BPM | SYSTOLIC BLOOD PRESSURE: 113 MMHG | DIASTOLIC BLOOD PRESSURE: 70 MMHG | WEIGHT: 133 LBS | OXYGEN SATURATION: 95 %

## 2017-02-02 VITALS
SYSTOLIC BLOOD PRESSURE: 109 MMHG | DIASTOLIC BLOOD PRESSURE: 66 MMHG | HEART RATE: 71 BPM | TEMPERATURE: 97.1 F | RESPIRATION RATE: 16 BRPM | OXYGEN SATURATION: 95 %

## 2017-02-02 DIAGNOSIS — D61.818 PANCYTOPENIA (H): ICD-10-CM

## 2017-02-02 DIAGNOSIS — D61.3 IDIOPATHIC APLASTIC ANEMIA (H): ICD-10-CM

## 2017-02-02 DIAGNOSIS — D61.3 IDIOPATHIC APLASTIC ANEMIA (H): Primary | ICD-10-CM

## 2017-02-02 DIAGNOSIS — D61.818 PANCYTOPENIA (H): Primary | ICD-10-CM

## 2017-02-02 LAB
ABO + RH BLD: NORMAL
ABO + RH BLD: NORMAL
ALBUMIN SERPL-MCNC: 3.2 G/DL (ref 3.4–5)
ALP SERPL-CCNC: 105 U/L (ref 40–150)
ALT SERPL W P-5'-P-CCNC: 33 U/L (ref 0–50)
ANION GAP SERPL CALCULATED.3IONS-SCNC: 10 MMOL/L (ref 3–14)
ANISOCYTOSIS BLD QL SMEAR: SLIGHT
AST SERPL W P-5'-P-CCNC: 13 U/L (ref 0–45)
BASOPHILS # BLD AUTO: 0 10E9/L (ref 0–0.2)
BASOPHILS NFR BLD AUTO: 0 %
BILIRUB SERPL-MCNC: 2.1 MG/DL (ref 0.2–1.3)
BLD GP AB SCN SERPL QL: NORMAL
BLD PROD TYP BPU: NORMAL
BLD UNIT ID BPU: 0
BLD UNIT ID BPU: 0
BLOOD BANK CMNT PATIENT-IMP: NORMAL
BLOOD PRODUCT CODE: NORMAL
BLOOD PRODUCT CODE: NORMAL
BPU ID: NORMAL
BPU ID: NORMAL
BUN SERPL-MCNC: 32 MG/DL (ref 7–30)
CALCIUM SERPL-MCNC: 8.6 MG/DL (ref 8.5–10.1)
CHLORIDE SERPL-SCNC: 102 MMOL/L (ref 94–109)
CO2 SERPL-SCNC: 27 MMOL/L (ref 20–32)
CREAT SERPL-MCNC: 1.04 MG/DL (ref 0.52–1.04)
DIFFERENTIAL METHOD BLD: ABNORMAL
EOSINOPHIL # BLD AUTO: 0 10E9/L (ref 0–0.7)
EOSINOPHIL NFR BLD AUTO: 0 %
ERYTHROCYTE [DISTWIDTH] IN BLOOD BY AUTOMATED COUNT: 16.6 % (ref 10–15)
GFR SERPL CREATININE-BSD FRML MDRD: 52 ML/MIN/1.7M2
GLUCOSE SERPL-MCNC: 105 MG/DL (ref 70–99)
HCT VFR BLD AUTO: 26.3 % (ref 35–47)
HGB BLD-MCNC: 8.8 G/DL (ref 11.7–15.7)
INR PPP: 0.93 (ref 0.86–1.14)
LYMPHOCYTES # BLD AUTO: 2.5 10E9/L (ref 0.8–5.3)
LYMPHOCYTES NFR BLD AUTO: 91 %
MCH RBC QN AUTO: 29.9 PG (ref 26.5–33)
MCHC RBC AUTO-ENTMCNC: 33.5 G/DL (ref 31.5–36.5)
MCV RBC AUTO: 90 FL (ref 78–100)
MONOCYTES # BLD AUTO: 0 10E9/L (ref 0–1.3)
MONOCYTES NFR BLD AUTO: 1 %
NEUTROPHILS # BLD AUTO: 0.2 10E9/L (ref 1.6–8.3)
NEUTROPHILS NFR BLD AUTO: 8 %
NRBC # BLD AUTO: 0 10*3/UL
NRBC BLD AUTO-RTO: 1 /100
NUM BPU REQUESTED: 1
PLATELET # BLD AUTO: 4 10E9/L (ref 150–450)
POTASSIUM SERPL-SCNC: 4.8 MMOL/L (ref 3.4–5.3)
PROT SERPL-MCNC: 7.2 G/DL (ref 6.8–8.8)
RBC # BLD AUTO: 2.94 10E12/L (ref 3.8–5.2)
SODIUM SERPL-SCNC: 138 MMOL/L (ref 133–144)
SPECIMEN EXP DATE BLD: NORMAL
TRANSFUSION STATUS PATIENT QL: NORMAL
WBC # BLD AUTO: 2.8 10E9/L (ref 4–11)

## 2017-02-02 PROCEDURE — 86850 RBC ANTIBODY SCREEN: CPT | Performed by: INTERNAL MEDICINE

## 2017-02-02 PROCEDURE — 86923 COMPATIBILITY TEST ELECTRIC: CPT | Performed by: INTERNAL MEDICINE

## 2017-02-02 PROCEDURE — 80053 COMPREHEN METABOLIC PANEL: CPT | Performed by: INTERNAL MEDICINE

## 2017-02-02 PROCEDURE — 36430 TRANSFUSION BLD/BLD COMPNT: CPT | Mod: ZF

## 2017-02-02 PROCEDURE — P9037 PLATE PHERES LEUKOREDU IRRAD: HCPCS | Performed by: PHYSICIAN ASSISTANT

## 2017-02-02 PROCEDURE — 86900 BLOOD TYPING SEROLOGIC ABO: CPT | Performed by: INTERNAL MEDICINE

## 2017-02-02 PROCEDURE — 85025 COMPLETE CBC W/AUTO DIFF WBC: CPT | Performed by: INTERNAL MEDICINE

## 2017-02-02 PROCEDURE — 85610 PROTHROMBIN TIME: CPT | Performed by: INTERNAL MEDICINE

## 2017-02-02 PROCEDURE — 86901 BLOOD TYPING SEROLOGIC RH(D): CPT | Performed by: INTERNAL MEDICINE

## 2017-02-02 RX ORDER — PREDNISONE 10 MG/1
50 TABLET ORAL DAILY
Qty: 180 TABLET | Refills: 3 | COMMUNITY
Start: 2017-02-02 | End: 2017-02-10

## 2017-02-02 ASSESSMENT — PAIN SCALES - GENERAL: PAINLEVEL: NO PAIN (0)

## 2017-02-02 NOTE — PATIENT INSTRUCTIONS
Contact Numbers    [facility] Main Line: [phone]  [facility] Triage:  [phone]    Call triage with chills and/or temperature greater than or equal to 100.5, uncontrolled nausea/vomiting, diarrhea, constipation, dizziness, shortness of breath, chest pain, bleeding, unexplained bruising, or any new/concerning symptoms, questions/concerns.     If you are having any concerning symptoms or wish to speak to a provider before your next infusion visit, please call your care coordinator or triage to notify them so we can adequately serve you.       After Hours: [phone]    If after hours, weekends, or holidays, call main hospital  and ask for Oncology doctor on call.           February 2017 Sunday Monday Tuesday Wednesday Thursday Friday Saturday                  1     2     [facility] DRAW   11:30 AM   (15 min.)    M[facility]ON[facility] LAB DRAW   [facility] Lab Draw     [facility] ONC INFUSION 360   12:00 PM   (360 min.)   [facility] ONCOLOGY INFUSION   [facility]     [facility] ONC RETURN    3:30 PM   (30 min.)   Rafita Rogel MD   [facility] Blood and Marrow Transplant 3     4       5     LEVEL 5    9:00 AM   (300 min.)   SOUTH CHAIR 4   [facility] 6     7     LEVEL 5    9:00 AM   (300 min.)   SOUTH CHAIR 2   [facility] 8     9     10     [facility] DRAW    9:00 AM   (15 min.)    [facility] LAB DRAW   [facility] Lab Draw     [facility] RETURN    9:30 AM   (50 min.)   Celine Walls PA-C   [facility]     [facility] ONC INFUSION 360   10:30 AM   (360 min.)   [facility] ONCOLOGY INFUSION   [facility] 11       12     13     14     15     16     17     18       19     20     21     22     23     24     25       26     27     28 March 2017 Sunday Monday Tuesday Wednesday Thursday Friday Saturday                  1     2     3     4       5     6     7     8     9     10      11       12     13     14     15     16     17     18       19     20     21     22     23     24     25       26     27     28     29     30     31                       Lab Results:  Recent Results (from the past 12 hour(s))   ABO/Rh type and screen    Collection Time: 02/02/17 11:57 AM   Result Value Ref Range    ABO Pending     RH(D) Pending     Antibody Screen Pending     Test Valid Only At Pending     Specimen Expires Pending    CBC with platelets differential    Collection Time: 02/02/17 11:57 AM   Result Value Ref Range    WBC 2.8 (L) 4.0 - 11.0 10e9/L    RBC Count 2.94 (L) 3.8 - 5.2 10e12/L    Hemoglobin 8.8 (L) 11.7 - 15.7 g/dL    Hematocrit 26.3 (L) 35.0 - 47.0 %    MCV 90 78 - 100 fl    MCH 29.9 26.5 - 33.0 pg    MCHC 33.5 31.5 - 36.5 g/dL    RDW 16.6 (H) 10.0 - 15.0 %    Platelet Count 4 (LL) 150 - 450 10e9/L    Diff Method Pending    Comprehensive metabolic panel    Collection Time: 02/02/17 11:57 AM   Result Value Ref Range    Sodium 138 133 - 144 mmol/L    Potassium 4.8 3.4 - 5.3 mmol/L    Chloride 102 94 - 109 mmol/L    Carbon Dioxide 27 20 - 32 mmol/L    Anion Gap 10 3 - 14 mmol/L    Glucose 105 (H) 70 - 99 mg/dL    Urea Nitrogen 32 (H) 7 - 30 mg/dL    Creatinine 1.04 0.52 - 1.04 mg/dL    GFR Estimate 52 (L) >60 mL/min/1.7m2    GFR Estimate If Black 63 >60 mL/min/1.7m2    Calcium 8.6 8.5 - 10.1 mg/dL    Bilirubin Total 2.1 (H) 0.2 - 1.3 mg/dL    Albumin 3.2 (L) 3.4 - 5.0 g/dL    Protein Total 7.2 6.8 - 8.8 g/dL    Alkaline Phosphatase 105 40 - 150 U/L    ALT 33 0 - 50 U/L    AST 13 0 - 45 U/L   INR    Collection Time: 02/02/17 11:57 AM   Result Value Ref Range    INR 0.93 0.86 - 1.14

## 2017-02-02 NOTE — MR AVS SNAPSHOT
After Visit Summary   2/2/2017    Bonny Raymundo    MRN: [id number]           Patient Information     Date Of Birth          1945        Visit Information        Provider Department      2/2/2017 12:00 PM UC 30 ATC;  ONCOLOGY INFUSION [facility]        Today's Diagnoses     Pancytopenia (H)    -  1     Idiopathic aplastic anemia (H)           Care Instructions    Contact Numbers    [facility] Main Line: [phone]  [facility] Triage:  [phone]    Call triage with chills and/or temperature greater than or equal to 100.5, uncontrolled nausea/vomiting, diarrhea, constipation, dizziness, shortness of breath, chest pain, bleeding, unexplained bruising, or any new/concerning symptoms, questions/concerns.     If you are having any concerning symptoms or wish to speak to a provider before your next infusion visit, please call your care coordinator or triage to notify them so we can adequately serve you.       After Hours: [phone]    If after hours, weekends, or holidays, call main hospital  and ask for Oncology doctor on call.           February 2017 Sunday Monday Tuesday Wednesday Thursday Friday Saturday                  1     2     [email or web address] DRAW   11:30 AM   (15 min.)    M[facility]ONIC LAB DRAW   [facility] Lab Draw     [email or web address] ONC INFUSION 360   12:00 PM   (360 min.)    ONCOLOGY INFUSION   [facility] ONC RETURN    3:30 PM   (30 min.)   Rafita Rogel MD   [facility] Blood and Marrow Transplant 3     4       5     LEVEL 5    9:00 AM   (300 min.)   SOUTH CHAIR 4   [facility] 6     7     LEVEL 5    9:00 AM   (300 min.)   SOUTH CHAIR 2   [facility] 8     9     10     P [facility] LAB DRAW    9:00 AM   (15 min.)    [facility]RAW   [facility]     [facility] RETURN    9:30 AM   (50 min.)   Celine Walls PA-C [facility] ONC  INFUSION 360   10:30 AM   (360 min.)    ONCOLOGY INFUSION   [facility] 11       12     13     14     15     16     17     18       19     20     21     22     23     24     25       26     27     28 March 2017 Sunday Monday Tuesday Wednesday Thursday Friday Saturday                  1     2     3     4       5     6     7     8     9     10     11       12     13     14     15     16     17     18       19     20     21     22     23     24     25       26     27     28     29     30     31                       Lab Results:  Recent Results (from the past 12 hour(s))   ABO/Rh type and screen    Collection Time: 02/02/17 11:57 AM   Result Value Ref Range    ABO Pending     RH(D) Pending     Antibody Screen Pending     Test Valid Only At Pending     Specimen Expires Pending    CBC with platelets differential    Collection Time: 02/02/17 11:57 AM   Result Value Ref Range    WBC 2.8 (L) 4.0 - 11.0 10e9/L    RBC Count 2.94 (L) 3.8 - 5.2 10e12/L    Hemoglobin 8.8 (L) 11.7 - 15.7 g/dL    Hematocrit 26.3 (L) 35.0 - 47.0 %    MCV 90 78 - 100 fl    MCH 29.9 26.5 - 33.0 pg    MCHC 33.5 31.5 - 36.5 g/dL    RDW 16.6 (H) 10.0 - 15.0 %    Platelet Count 4 (LL) 150 - 450 10e9/L    Diff Method Pending    Comprehensive metabolic panel    Collection Time: 02/02/17 11:57 AM   Result Value Ref Range    Sodium 138 133 - 144 mmol/L    Potassium 4.8 3.4 - 5.3 mmol/L    Chloride 102 94 - 109 mmol/L    Carbon Dioxide 27 20 - 32 mmol/L    Anion Gap 10 3 - 14 mmol/L    Glucose 105 (H) 70 - 99 mg/dL    Urea Nitrogen 32 (H) 7 - 30 mg/dL    Creatinine 1.04 0.52 - 1.04 mg/dL    GFR Estimate 52 (L) >60 mL/min/1.7m2    GFR Estimate If Black 63 >60 mL/min/1.7m2    Calcium 8.6 8.5 - 10.1 mg/dL    Bilirubin Total 2.1 (H) 0.2 - 1.3 mg/dL    Albumin 3.2 (L) 3.4 - 5.0 g/dL    Protein Total 7.2 6.8 - 8.8 g/dL    Alkaline Phosphatase 105 40 - 150 U/L    ALT 33 0 - 50 U/L    AST 13 0 - 45 U/L   INR     Collection Time: 02/02/17 11:57 AM   Result Value Ref Range    INR 0.93 0.86 - 1.14               Follow-ups after your visit        Your next 10 appointments already scheduled     Feb 02, 2017  3:30 PM   RETURN ONC with Rafita Rogel MD   [facility] Blood and Marrow Transplant ([facility])    [address]  [address] [facility] [address]   [phone]            Feb 05, 2017  9:00 AM   Level 5 with SOUTH CHAIR 4   [facility] ([facility])    [facility] [place]  [address]  [place] [address]   [phone]            Feb 07, 2017  9:00 AM   Level 5 with SOUTH CHAIR 2   [facility] ([facility])    [facility] [place]  [address]  [place] [address]   [phone]            Feb 10, 2017  9:00 AM   [facility] Lab Draw with  MASONIC LAB DRAW   [facility] Lab Draw ([facility])    [address] [address]   [phone]            Feb 10, 2017  9:30 AM   (Arrive by 9:15 AM)   Return Visit with Celine Walls PA-C   [facility] ([facility])    [address] [place] [address]   [phone]            Feb 10, 2017 10:30 AM   Infusion 360 with  ONCOLOGY INFUSION, UC 21 ATC   [facility] ([facility])    [address] [place] [address]   [phone]              Future tests that were ordered for you today     Open Standing Orders        Priority Remaining Interval Expires Ordered    Transfuse platelets unit Routine 99/100 TRANSFUSE 1 DOSE  2/2/2017    Red blood cell prepare order unit Routine 99/100 CONDITIONAL (SPECIFY) BLOOD  2/1/2017    Platelets prepare order unit Routine 99/100 CONDITIONAL (SPECIFY) BLOOD   "2017            Who to contact     If you have questions or need follow up information about today's clinic visit or your schedule please contact [facility] directly at [phone].  Normal or non-critical lab and imaging results will be communicated to you by MyChart, letter or phone within 4 business days after the clinic has received the results. If you do not hear from us within 7 days, please contact the clinic through MyChart or phone. If you have a critical or abnormal lab result, we will notify you by phone as soon as possible.  Submit refill requests through [employer] or call your pharmacy and they will forward the refill request to us. Please allow 3 business days for your refill to be completed.          Additional Information About Your Visit        [employer] TechnologyHospital for [facility] CareBright[employer] Information     [employer] lets you send messages to your doctor, view your test results, renew your prescriptions, schedule appointments and more. To sign up, go to [email or web address] . Click on \"Log in\" on the left side of the screen, which will take you to the Welcome page. Then click on \"Sign up Now\" on the right side of the page.     You will be asked to enter the access code listed below, as well as some personal information. Please follow the directions to create your username and password.     Your access code is: [email or web address]  Expires: 3/15/2017 11:47 AM     Your access code will  in 90 days. If you need help or a new code, please call your [facility] clinic or [phone].        Care EveryWhere ID     This is your Care EveryWhere ID. This could be used by other organizations to access your [facility] medical records  [id number]        Your Vitals Were     Pulse Temperature Respirations Pulse Oximetry          74 96  F (35.6  C) (Oral) 18 95%         Blood Pressure from Last 3 Encounters:   17 109/71   17 133/78   17 130/87    Weight from Last 3 Encounters:   17 60.3 kg (132 lb 15 oz) "   01/25/17 60.737 kg (133 lb 14.4 oz)   01/17/17 61.598 kg (135 lb 12.8 oz)              We Performed the Following     ABO/Rh type and screen     Blood component     CBC with platelets differential     Comprehensive metabolic panel     INR     Platelets prepare order unit     Red blood cell prepare order unit     Transfuse platelets unit        Primary Care Provider Office Phone # Fax #    Rafita Spencer Rogel [phone]        [facility] [address]  [facility] [address]        Thank you!     Thank you for choosing [facility]  for your care. Our goal is always to provide you with excellent care. Hearing back from our patients is one way we can continue to improve our services. Please take a few minutes to complete the written survey that you may receive in the mail after your visit with us. Thank you!             Your Updated Medication List - Protect others around you: Learn how to safely use, store and throw away your medicines at www.disposemymeds.org.          This list is accurate as of: 2/2/17  1:38 PM.  Always use your most recent med list.                   Brand Name Dispense Instructions for use    BENADRYL ALLERGY 25 MG tablet   Generic drug:  diphenhydrAMINE     56 tablet    Take 1 tablet (25 mg) by mouth nightly as needed       chlorpheniramine 4 MG tablet    CHLOR-TRIMETON     Take 4 mg by mouth every 6 hours as needed. For head cold       COMPRESSION STOCKINGS     2 each    Wear compression stockings at 20-30 mmHg rating most time during the day to the affected leg (left leg) or both legs. Take them off at night.       cycloSPORINE modified 100 MG capsule    GENERIC EQUIVALENT    120 capsule    Take 175 mg by mouth 2 times daily . This reflects a dose change.       diclofenac 1 % Gel topical gel    VOLTAREN    100 g    Apply 2 g topically 2 times daily       eltrombopag 50 MG tablet    PROMACTA    30 tablet    Take 3 tablets (150 mg) by mouth  daily Administer on an empty stomach, 1 hour before or 2 hours after a meal. This represents an INCREASE in the dose, as per Dr. Rogel.       fluconazole 200 MG tablet    DIFLUCAN    30 tablet    Take 1 tablet (200 mg) by mouth daily       levofloxacin 500 MG tablet    LEVAQUIN    30 tablet    Take 1 tablet (500 mg) by mouth daily       loratadine 10 MG tablet    CLARITIN     Take 10 mg by mouth daily as needed. For head cold       order for DME     1 Box    Equipment being ordered: knee high compression stockings- 18-20 mm       oxyCODONE 5 MG IR tablet    ROXICODONE    50 tablet    Take 1 tablet (5 mg) by mouth every 6 hours as needed for moderate to severe pain       pantoprazole 40 MG EC tablet    PROTONIX    30 tablet    Take 1 tablet (40 mg) by mouth every morning       polyethylene glycol Packet    MIRALAX/GLYCOLAX    14 packet    Take 17 g by mouth daily . If you start to have loose stools/diarrhea or multiple bowel movements, ok to hold this medication. Then resume if no bowel movement after 24-48hours.       predniSONE 10 MG tablet    DELTASONE    180 tablet    Take 6 tablets (60 mg) by mouth daily Or as directed       SENNA S 8.6-50 MG per tablet   Generic drug:  senna-docusate     100 tablet    Can take 1-2 tablets up to twice a day. Can start with 1-2 tablets daily. If no bowel movement within 24 hours, can take 1-2 tablets twice a day. HOLD if you develop diarrhea. Resume taking if no bowel movement in 24 hours.       TYLENOL PO      Take 325 mg by mouth every 6 hours as needed for mild pain or fever

## 2017-02-02 NOTE — NURSING NOTE
Chief Complaint   Patient presents with     Blood Draw     Pt with hx of pancytopenia labs collected via picc line.

## 2017-02-02 NOTE — PROGRESS NOTES
Infusion Nursing Note:  Bonny Raymundo presents today for platelet transfusion.    Patient seen by provider today: No: Will see Dr. Rogel after infusion today   present during visit today: Not Applicable.    Note: Patient feeling fatigued and short of breath but states this is unchanged.  Pt denies any abnormal bleeding, does have a new bruise on her forehead which she is unsure how she got. Denies any falls.    Platelets are 4 today and patient concerned only one bag of platelets would not be enough today.   TORB: Dr. Rogel/Leola Cantrell RN: Patient should receive only one bag of platelets today. Okay to keep schedule as is with patient returning on Sunday for recheck.  Pt agreed with plan and will discuss further with Dr. Rogel at office visit today.    Intravenous Access:  PICC.    Treatment Conditions:  HGB      8.8   2/2/2017  WBC      2.8   2/2/2017   ANEU      0.4   1/31/2017  PLT        4   2/2/2017     NA      138   2/2/2017                POTASSIUM      4.8   2/2/2017        MAG      1.4   1/25/2017         CR     1.04   2/2/2017                LEO      8.6   2/2/2017             BILITOTAL      2.1   2/2/2017        ALBUMIN      3.2   2/2/2017                 ALT       33   2/2/2017        AST       13   2/2/2017  Results reviewed, labs MET treatment parameters, ok to proceed with treatment.      Post Infusion Assessment:  Patient tolerated infusion without incident.  Blood return noted pre and post infusion.  Site patent and intact, free from redness, edema or discomfort.  No evidence of extravasations.    Discharge Plan:   Patient declined prescription refills.  Discharge instructions reviewed with: Patient.  Patient and/or family verbalized understanding of discharge instructions and all questions answered.  Patient discharged in stable condition ambulatory    Leola Cantrell RN

## 2017-02-02 NOTE — Clinical Note
2/2/2017      RE: Bonny Raymundo  [address]  [facility] [address]       /70 mmHg  Pulse 83  Resp 16  Wt 60.328 kg (133 lb)  SpO2 95%  Wt Readings from Last 4 Encounters:   02/02/17 60.328 kg (133 lb)   01/26/17 60.3 kg (132 lb 15 oz)   01/25/17 60.737 kg (133 lb 14.4 oz)   01/17/17 61.598 kg (135 lb 12.8 oz)     Severe Aplastic anemia  Had hemorrhoidal or LGI bleeding 2 weeks ago. No bleeding since. Getting plts q2-3 days.   Post ATG/CSA/Steroids/Eltrombopag for aplasia therpay early January.    No bleeding noted now. No nosebleed or  GI bleeding.  Easy brusing.  No fcs. No pain.  Eating ok ; mild constipation    PHYSICAL EXAMINATION:   VITAL SIGNS:  Her exam shows acceptable vital signs.   LYMPH:  She has no Supra clavic or cervical lymphadenopathy.   EXTREMITIES:  She has no peripheral edema.   SKIN:  No conjunctival or LE petech but notable non palp bruises  HEENT:  Her oropharynx is clear without mucosal lesions.   LUNGS:  Clear.   CARDIOVASCULAR:  Heart tones are regular with a soft ejection murmur.   ABDOMEN:  Soft and nontender without hepatosplenomegaly or masses.      Labs;  Chem ok  CBC with lower plts; slowly falling Hb   200-400 PMNs last few weeks    IMpr;  Taking ongoing immunosuppression for aplasia   BID, pred reduced today to 50mg/d; eltrombopag 150 mg/day    Cont and monitor csa levels targetting trough level 200-400  If no changes will decrease pred to 40 mg/day next week.   After that will taper slowly; only reducing by 10mg every other week.    Heme;    Transfuse for Hb <8  Plts <30,000  Conditional orders in place.    FEN   Ok. Monitor creatinine and BP while on CSA    GI/Nutrition.  Had some loose stools;   Monitor external signs of GI bleed.    Plan:  Home care checkup periodically.    Sunday Feb 5 To [facility] for CBC and planned platelet transfusion plus RBC crossmatch for Tuesday Feb 7 TUesday to To [facility] for CBC, cyclosporine level  and  planned platelet transfusion; likely RBC transfusions    Thursday Feb 9   RTC to [facility] for visit and labs and planned platelet transfusion.    Pt advised about phone contacts for help/advice. She knows how to call for financial assistance in ongoing eltrombopag therapy.    Rafita Rogel MD  Professor of Medicine

## 2017-02-03 ENCOUNTER — TELEPHONE (OUTPATIENT)
Dept: PHARMACY | Facility: CLINIC | Age: 72
End: 2017-02-03

## 2017-02-03 NOTE — TELEPHONE ENCOUNTER
I assisted the pt to receive free promacta from [employer] . Since the application to now, the dosing has changed to 3 tabs qd. Can someone verbally order the dose change to [facility] ph: [phone]? Pt has new shipment from [employer] but this will only last 10 days.     Laisha Mcelroy  Pharmacy Liaison  Cell: [phone] Page: [phone]

## 2017-02-04 NOTE — TELEPHONE ENCOUNTER
The Promacta was ordered by Dr. Rogel so I will defer this to Светлана.    Kathy Mcqueen, BS, RN, PHN  [facility] Care Coordinator  [phone]  February 3, 2017

## 2017-02-05 ENCOUNTER — INFUSION THERAPY VISIT (OUTPATIENT)
Dept: INFUSION THERAPY | Facility: CLINIC | Age: 72
End: 2017-02-05
Attending: PHYSICIAN ASSISTANT
Payer: COMMERCIAL

## 2017-02-05 ENCOUNTER — HOSPITAL ENCOUNTER (OUTPATIENT)
Facility: CLINIC | Age: 72
Setting detail: SPECIMEN
Discharge: HOME | End: 2017-02-05
Attending: INTERNAL MEDICINE | Admitting: INTERNAL MEDICINE
Payer: COMMERCIAL

## 2017-02-05 VITALS
DIASTOLIC BLOOD PRESSURE: 58 MMHG | HEART RATE: 113 BPM | OXYGEN SATURATION: 97 % | RESPIRATION RATE: 16 BRPM | SYSTOLIC BLOOD PRESSURE: 87 MMHG

## 2017-02-05 DIAGNOSIS — D61.818 PANCYTOPENIA (H): ICD-10-CM

## 2017-02-05 LAB
BASOPHILS # BLD AUTO: 0 10E9/L (ref 0–0.2)
BASOPHILS NFR BLD AUTO: 0 %
DIFFERENTIAL METHOD BLD: ABNORMAL
EOSINOPHIL # BLD AUTO: 0 10E9/L (ref 0–0.7)
EOSINOPHIL NFR BLD AUTO: 0 %
ERYTHROCYTE [DISTWIDTH] IN BLOOD BY AUTOMATED COUNT: 16.7 % (ref 10–15)
HCT VFR BLD AUTO: 25.5 % (ref 35–47)
HGB BLD-MCNC: 8.8 G/DL (ref 11.7–15.7)
IMM GRANULOCYTES # BLD: 0 10E9/L (ref 0–0.4)
IMM GRANULOCYTES NFR BLD: 0.3 %
LYMPHOCYTES # BLD AUTO: 3.3 10E9/L (ref 0.8–5.3)
LYMPHOCYTES NFR BLD AUTO: 83.6 %
MCH RBC QN AUTO: 30.3 PG (ref 26.5–33)
MCHC RBC AUTO-ENTMCNC: 34.5 G/DL (ref 31.5–36.5)
MCV RBC AUTO: 88 FL (ref 78–100)
MONOCYTES # BLD AUTO: 0.1 10E9/L (ref 0–1.3)
MONOCYTES NFR BLD AUTO: 3 %
NEUTROPHILS # BLD AUTO: 0.5 10E9/L (ref 1.6–8.3)
NEUTROPHILS NFR BLD AUTO: 13.1 %
NRBC # BLD AUTO: 0 10*3/UL
NRBC BLD AUTO-RTO: 1 /100
PLATELET # BLD AUTO: 33 10E9/L (ref 150–450)
RBC # BLD AUTO: 2.9 10E12/L (ref 3.8–5.2)
WBC # BLD AUTO: 4 10E9/L (ref 4–11)

## 2017-02-05 PROCEDURE — 36415 COLL VENOUS BLD VENIPUNCTURE: CPT

## 2017-02-05 PROCEDURE — 85025 COMPLETE CBC W/AUTO DIFF WBC: CPT | Performed by: INTERNAL MEDICINE

## 2017-02-05 ASSESSMENT — PAIN SCALES - GENERAL: PAINLEVEL: MILD PAIN (2)

## 2017-02-05 NOTE — PROGRESS NOTES
Infusion Nursing Note:  Bonny Raymundo presents today for labs, possible transfusion.    Patient seen by provider today: No   present during visit today: Not Applicable.    Note: Only concern is mildback pain today..    Intravenous Access:  Labs drawn without difficulty.  PICC.    Treatment Conditions:  HGB      8.8   2/5/2017  WBC      4.0   2/5/2017   ANEU      0.5   2/5/2017  PLT       33   2/5/2017     Results reviewed, labs did NOT meet treatment parameters: Plts 33 amd HGB 8.8. Copy of results given to pt.       Post Infusion Assessment:    Blood return noted pre and post infusion.  Site patent and intact, free from redness, edema or discomfort.  No evidence of extravasations.    Discharge Plan:   Copy of AVS reviewed with patient and/or family.  Patient will return 2/7/17 for next appointment.  Patient discharged in stable condition accompanied by: self.  Departure Mode: Ambulatory.    ANABELLE Lamar RN

## 2017-02-06 ENCOUNTER — CARE COORDINATION (OUTPATIENT)
Dept: ONCOLOGY | Facility: CLINIC | Age: 72
End: 2017-02-06

## 2017-02-06 NOTE — PROGRESS NOTES
Pharmacy assisted the pt to receive free promacta from [employer] . Since the application was sent the dosing has changed to 3 tabs qd. Called [facility] ph: [phone] per the request of the pharmacy to update them on current dosage for prescription.

## 2017-02-07 ENCOUNTER — INFUSION THERAPY VISIT (OUTPATIENT)
Dept: INFUSION THERAPY | Facility: CLINIC | Age: 72
End: 2017-02-07
Attending: PHYSICIAN ASSISTANT
Payer: COMMERCIAL

## 2017-02-07 ENCOUNTER — HOSPITAL ENCOUNTER (OUTPATIENT)
Facility: CLINIC | Age: 72
Setting detail: SPECIMEN
Discharge: HOME | End: 2017-02-07
Attending: INTERNAL MEDICINE | Admitting: INTERNAL MEDICINE
Payer: COMMERCIAL

## 2017-02-07 VITALS
SYSTOLIC BLOOD PRESSURE: 129 MMHG | RESPIRATION RATE: 16 BRPM | DIASTOLIC BLOOD PRESSURE: 82 MMHG | HEART RATE: 82 BPM | TEMPERATURE: 98 F

## 2017-02-07 DIAGNOSIS — D61.818 PANCYTOPENIA (H): ICD-10-CM

## 2017-02-07 DIAGNOSIS — D61.3 IDIOPATHIC APLASTIC ANEMIA (H): Primary | ICD-10-CM

## 2017-02-07 LAB
BASOPHILS # BLD AUTO: 0 10E9/L (ref 0–0.2)
BASOPHILS NFR BLD AUTO: 0 %
BLD PROD TYP BPU: NORMAL
BLD PROD TYP BPU: NORMAL
BLD UNIT ID BPU: 0
BLOOD PRODUCT CODE: NORMAL
BPU ID: NORMAL
CYCLOSPORINE BLD LC/MS/MS-MCNC: 406 UG/L (ref 50–400)
DIFFERENTIAL METHOD BLD: ABNORMAL
EOSINOPHIL # BLD AUTO: 0 10E9/L (ref 0–0.7)
EOSINOPHIL NFR BLD AUTO: 0 %
ERYTHROCYTE [DISTWIDTH] IN BLOOD BY AUTOMATED COUNT: 16.7 % (ref 10–15)
HCT VFR BLD AUTO: 23.8 % (ref 35–47)
HGB BLD-MCNC: 8.3 G/DL (ref 11.7–15.7)
LYMPHOCYTES # BLD AUTO: 3 10E9/L (ref 0.8–5.3)
LYMPHOCYTES NFR BLD AUTO: 80 %
MCH RBC QN AUTO: 30.3 PG (ref 26.5–33)
MCHC RBC AUTO-ENTMCNC: 34.9 G/DL (ref 31.5–36.5)
MCV RBC AUTO: 87 FL (ref 78–100)
MONOCYTES # BLD AUTO: 0 10E9/L (ref 0–1.3)
MONOCYTES NFR BLD AUTO: 0 %
NEUTROPHILS # BLD AUTO: 0.7 10E9/L (ref 1.6–8.3)
NEUTROPHILS NFR BLD AUTO: 20 %
NRBC # BLD AUTO: 0.1 10*3/UL
NRBC BLD AUTO-RTO: 3 /100
NUM BPU REQUESTED: 1
PLATELET # BLD AUTO: 18 10E9/L (ref 150–450)
RBC # BLD AUTO: 2.74 10E12/L (ref 3.8–5.2)
TRANSFUSION STATUS PATIENT QL: NORMAL
TRANSFUSION STATUS PATIENT QL: NORMAL
WBC # BLD AUTO: 3.7 10E9/L (ref 4–11)

## 2017-02-07 PROCEDURE — 85025 COMPLETE CBC W/AUTO DIFF WBC: CPT | Performed by: INTERNAL MEDICINE

## 2017-02-07 PROCEDURE — 36430 TRANSFUSION BLD/BLD COMPNT: CPT

## 2017-02-07 PROCEDURE — P9037 PLATE PHERES LEUKOREDU IRRAD: HCPCS | Performed by: PHYSICIAN ASSISTANT

## 2017-02-07 PROCEDURE — 80158 DRUG ASSAY CYCLOSPORINE: CPT | Performed by: INTERNAL MEDICINE

## 2017-02-07 ASSESSMENT — PAIN SCALES - GENERAL: PAINLEVEL: MILD PAIN (2)

## 2017-02-07 NOTE — MR AVS SNAPSHOT
After Visit Summary   2/7/2017    Bonny Raymundo    MRN: [id number]           Patient Information     Date Of Birth          1945        Visit Information        Provider Department      2/7/2017 9:00 AM SOUTH CHAIR 2 [facility]        Today's Diagnoses     Idiopathic aplastic anemia (H)    -  1     Pancytopenia (H)            Follow-ups after your visit        Your next 10 appointments already scheduled     Feb 10, 2017  9:00 AM   [facility] with [facility] LAB DRAW   [facility] Lab Draw ([facility])    [address] [place] [id number]   [phone]            Feb 10, 2017  9:30 AM   (Arrive by 9:15 AM)   Return Visit with Celine Walls PA-C   [facility] ([facility])    [address] [place] [id number]   [phone]            Feb 10, 2017 10:30 AM   Infusion 360 with  ONCOLOGY INFUSION,  21 ATC   [facility] ([facility])    [address] [place] [id number] [phone]              Future tests that were ordered for you today     Open Standing Orders        Priority Remaining Interval Expires Ordered    Platelets prepare order unit Routine 99/100 CONDITIONAL (SPECIFY) BLOOD  2/7/2017    Transfuse platelets unit Routine 99/100 TRANSFUSE 1 DOSE  2/7/2017            Who to contact     If you have questions or need follow up information about today's clinic visit or your schedule please contact [facility] directly at [phone].  Normal or non-critical lab and imaging results will be communicated to you by MyChart, letter or phone within 4 business days after the clinic has received the results. If you do not hear from us within 7 days, please contact the clinic through MyChart or phone. If you have a critical or  "abnormal lab result, we will notify you by phone as soon as possible.  Submit refill requests through [employer] or call your pharmacy and they will forward the refill request to us. Please allow 3 business days for your refill to be completed.          Additional Information About Your Visit        [facility] Information     [employer] lets you send messages to your doctor, view your test results, renew your prescriptions, schedule appointments and more. To sign up, go to [email or web address] . Click on \"Log in\" on the left side of the screen, which will take you to the Welcome page. Then click on \"Sign up Now\" on the right side of the page.     You will be asked to enter the access code listed below, as well as some personal information. Please follow the directions to create your username and password.     Your access code is: [email or web address]  Expires: 3/15/2017 11:47 AM     Your access code will  in 90 days. If you need help or a new code, please call your [place] clinic or [phone].        Care EveryWhere ID     This is your Care EveryWhere ID. This could be used by other organizations to access your [place] medical records  [id number]        Your Vitals Were     Pulse Temperature Respirations             82 98  F (36.7  C) (Oral) 16          Blood Pressure from Last 3 Encounters:   17 129/82   17 87/58   17 113/70    Weight from Last 3 Encounters:   17 60.328 kg (133 lb)   17 60.3 kg (132 lb 15 oz)   17 60.737 kg (133 lb 14.4 oz)              We Performed the Following     Blood component     CBC with platelets differential     Cyclosporine     Obtain affirmation of informed consent     Platelets prepare order unit     Transfuse platelets unit     Treatment Conditions        Primary Care Provider Office Phone # Fax #    Rafita Rogel [phone]        [facility] [address] [facility] [address]  [facility] [address]        Thank you!     Thank you " for choosing [facility]  for your care. Our goal is always to provide you with excellent care. Hearing back from our patients is one way we can continue to improve our services. Please take a few minutes to complete the written survey that you may receive in the mail after your visit with us. Thank you!             Your Updated Medication List - Protect others around you: Learn how to safely use, store and throw away your medicines at www.disposemymeds.org.          This list is accurate as of: 2/7/17 11:55 AM.  Always use your most recent med list.                   Brand Name Dispense Instructions for use    BENADRYL ALLERGY 25 MG tablet   Generic drug:  diphenhydrAMINE     56 tablet    Take 1 tablet (25 mg) by mouth nightly as needed       chlorpheniramine 4 MG tablet    CHLOR-TRIMETON     Take 4 mg by mouth every 6 hours as needed. For head cold       COMPRESSION STOCKINGS     2 each    Wear compression stockings at 20-30 mmHg rating most time during the day to the affected leg (left leg) or both legs. Take them off at night.       cycloSPORINE modified 100 MG capsule    GENERIC EQUIVALENT    120 capsule    Take 175 mg by mouth 2 times daily . This reflects a dose change.       diclofenac 1 % Gel topical gel    VOLTAREN    100 g    Apply 2 g topically 2 times daily       * eltrombopag 50 MG tablet    PROMACTA    30 tablet    Take 3 tablets (150 mg) by mouth daily Administer on an empty stomach, 1 hour before or 2 hours after a meal. This represents an INCREASE in the dose, as per Dr. Rogel.       * eltrombopag 50 MG tablet    PROMACTA    180 tablet    Take 3 tablets (150 mg) by mouth daily Administer on an empty stomach, 1 hour before or 2 hours after a meal. Or as directed       fluconazole 200 MG tablet    DIFLUCAN    30 tablet    Take 1 tablet (200 mg) by mouth daily       levofloxacin 500 MG tablet    LEVAQUIN    30 tablet    Take 1 tablet (500 mg) by mouth daily        loratadine 10 MG tablet    CLARITIN     Take 10 mg by mouth daily as needed. For head cold       order for DME     1 Box    Equipment being ordered: knee high compression stockings- 18-20 mm       oxyCODONE 5 MG IR tablet    ROXICODONE    50 tablet    Take 1 tablet (5 mg) by mouth every 6 hours as needed for moderate to severe pain       pantoprazole 40 MG EC tablet    PROTONIX    30 tablet    Take 1 tablet (40 mg) by mouth every morning       polyethylene glycol Packet    MIRALAX/GLYCOLAX    14 packet    Take 17 g by mouth daily . If you start to have loose stools/diarrhea or multiple bowel movements, ok to hold this medication. Then resume if no bowel movement after 24-48hours.       predniSONE 10 MG tablet    DELTASONE    180 tablet    Take 5 tablets (50 mg) by mouth daily Or as directed       SENNA S 8.6-50 MG per tablet   Generic drug:  senna-docusate     100 tablet    Can take 1-2 tablets up to twice a day. Can start with 1-2 tablets daily. If no bowel movement within 24 hours, can take 1-2 tablets twice a day. HOLD if you develop diarrhea. Resume taking if no bowel movement in 24 hours.       TYLENOL PO      Take 325 mg by mouth every 6 hours as needed for mild pain or fever       * Notice:  This list has 2 medication(s) that are the same as other medications prescribed for you. Read the directions carefully, and ask your doctor or other care provider to review them with you.

## 2017-02-07 NOTE — PROGRESS NOTES
Infusion Nursing Note:  Bonny Raymundo presents today for transfusion.    Patient seen by provider today: No   present during visit today: Not Applicable.    Note: N/A.    Intravenous Access:  PICC.    Treatment Conditions:  HGB      8.3   2/7/2017  WBC      3.7   2/7/2017   ANEU      0.5   2/5/2017  PLT       18   2/7/2017     Results reviewed, labs MET treatment parameters, ok to proceed with treatment. Pt received 1 unit platelets without difficulty.  Blood transfusion consent signed 1/9/17.      Post Infusion Assessment:  Patient tolerated transfusion without incident.  Site patent and intact, free from redness, edema or discomfort.  No evidence of extravasations.  Access discontinued per protocol.    Discharge Plan:   Patient and/or family verbalized understanding of discharge instructions and all questions answered.  Copy of AVS reviewed with patient and/or family.  Patient will return next week for next appointment.  Patient discharged in stable condition accompanied by: self.  Departure Mode: Ambulatory.    Laisha Jeffery RN

## 2017-02-08 ENCOUNTER — CARE COORDINATION (OUTPATIENT)
Dept: ONCOLOGY | Facility: CLINIC | Age: 72
End: 2017-02-08

## 2017-02-08 NOTE — PROGRESS NOTES
Patient called in this AM to know what her CSA level was from yesterday and if she needed to make any dose adjustments.   Her CSA level was 406 and per Dr. Rogel since there are no toxicities from the medication at this time and she is doing well at the current dose of 175 MG BID, Dr Rogel would like to continue as is for now.  Patient verbalized understanding and is agreeable to plan.She also made note that some of her friends and family had noticed that she seems a little more jaundice then normal though patient does not see it. She wanted Dr. Rogel to know. Will update him and get back to her if there are any additional changes to the plan. Informed her that is she does not hear back from me she should keep to her current schedule and be here on Friday as plannedfor her follow up appointment. Encouraged her to  call if she had any changes in status and or any questions.

## 2017-02-09 LAB
BLD PROD TYP BPU: NORMAL
NUM BPU REQUESTED: 1

## 2017-02-10 ENCOUNTER — ONCOLOGY VISIT (OUTPATIENT)
Dept: ONCOLOGY | Facility: CLINIC | Age: 72
End: 2017-02-10
Attending: PHYSICIAN ASSISTANT
Payer: COMMERCIAL

## 2017-02-10 VITALS
TEMPERATURE: 98.1 F | OXYGEN SATURATION: 94 % | RESPIRATION RATE: 18 BRPM | DIASTOLIC BLOOD PRESSURE: 89 MMHG | SYSTOLIC BLOOD PRESSURE: 153 MMHG | HEART RATE: 73 BPM

## 2017-02-10 VITALS
DIASTOLIC BLOOD PRESSURE: 58 MMHG | HEART RATE: 98 BPM | TEMPERATURE: 97.4 F | OXYGEN SATURATION: 92 % | SYSTOLIC BLOOD PRESSURE: 106 MMHG

## 2017-02-10 DIAGNOSIS — D61.818 PANCYTOPENIA (H): ICD-10-CM

## 2017-02-10 DIAGNOSIS — D61.3 IDIOPATHIC APLASTIC ANEMIA (H): Primary | ICD-10-CM

## 2017-02-10 LAB
ABO + RH BLD: NORMAL
ABO + RH BLD: NORMAL
ALBUMIN SERPL-MCNC: 3.2 G/DL (ref 3.4–5)
ALP SERPL-CCNC: 103 U/L (ref 40–150)
ALT SERPL W P-5'-P-CCNC: 31 U/L (ref 0–50)
ANION GAP SERPL CALCULATED.3IONS-SCNC: 12 MMOL/L (ref 3–14)
AST SERPL W P-5'-P-CCNC: 16 U/L (ref 0–45)
BASOPHILS # BLD AUTO: 0 10E9/L (ref 0–0.2)
BASOPHILS NFR BLD AUTO: 0 %
BILIRUB SERPL-MCNC: 2.6 MG/DL (ref 0.2–1.3)
BLD GP AB SCN SERPL QL: NORMAL
BLD PROD TYP BPU: NORMAL
BLD UNIT ID BPU: 0
BLD UNIT ID BPU: 0
BLOOD BANK CMNT PATIENT-IMP: NORMAL
BLOOD PRODUCT CODE: NORMAL
BLOOD PRODUCT CODE: NORMAL
BPU ID: NORMAL
BPU ID: NORMAL
BUN SERPL-MCNC: 47 MG/DL (ref 7–30)
CALCIUM SERPL-MCNC: 9 MG/DL (ref 8.5–10.1)
CHLORIDE SERPL-SCNC: 101 MMOL/L (ref 94–109)
CO2 SERPL-SCNC: 26 MMOL/L (ref 20–32)
COPATH REPORT: NORMAL
CREAT SERPL-MCNC: 1.47 MG/DL (ref 0.52–1.04)
DIFFERENTIAL METHOD BLD: ABNORMAL
EOSINOPHIL # BLD AUTO: 0 10E9/L (ref 0–0.7)
EOSINOPHIL NFR BLD AUTO: 0 %
ERYTHROCYTE [DISTWIDTH] IN BLOOD BY AUTOMATED COUNT: 17.2 % (ref 10–15)
GFR SERPL CREATININE-BSD FRML MDRD: 35 ML/MIN/1.7M2
GLUCOSE SERPL-MCNC: 90 MG/DL (ref 70–99)
HCT VFR BLD AUTO: 22.1 % (ref 35–47)
HGB BLD-MCNC: 7.7 G/DL (ref 11.7–15.7)
IMM GRANULOCYTES # BLD: 0 10E9/L (ref 0–0.4)
IMM GRANULOCYTES NFR BLD: 0.4 %
INR PPP: 0.97 (ref 0.86–1.14)
LDH SERPL L TO P-CCNC: 330 U/L (ref 81–234)
LYMPHOCYTES # BLD AUTO: 1.5 10E9/L (ref 0.8–5.3)
LYMPHOCYTES NFR BLD AUTO: 64.3 %
MCH RBC QN AUTO: 30.8 PG (ref 26.5–33)
MCHC RBC AUTO-ENTMCNC: 34.8 G/DL (ref 31.5–36.5)
MCV RBC AUTO: 88 FL (ref 78–100)
MONOCYTES # BLD AUTO: 0.2 10E9/L (ref 0–1.3)
MONOCYTES NFR BLD AUTO: 6.3 %
NEUTROPHILS # BLD AUTO: 0.7 10E9/L (ref 1.6–8.3)
NEUTROPHILS NFR BLD AUTO: 29 %
NRBC # BLD AUTO: 0.1 10*3/UL
NRBC BLD AUTO-RTO: 4 /100
NUM BPU REQUESTED: 1
PLATELET # BLD AUTO: 24 10E9/L (ref 150–450)
POTASSIUM SERPL-SCNC: 4.4 MMOL/L (ref 3.4–5.3)
PROT SERPL-MCNC: 7.2 G/DL (ref 6.8–8.8)
RBC # BLD AUTO: 2.5 10E12/L (ref 3.8–5.2)
RETICS # AUTO: 18.5 10E9/L (ref 25–95)
RETICS/RBC NFR AUTO: 0.7 % (ref 0.5–2)
SODIUM SERPL-SCNC: 138 MMOL/L (ref 133–144)
SPECIMEN EXP DATE BLD: NORMAL
TRANSFUSION STATUS PATIENT QL: NORMAL
WBC # BLD AUTO: 2.4 10E9/L (ref 4–11)

## 2017-02-10 PROCEDURE — P9037 PLATE PHERES LEUKOREDU IRRAD: HCPCS | Performed by: PHYSICIAN ASSISTANT

## 2017-02-10 PROCEDURE — 86923 COMPATIBILITY TEST ELECTRIC: CPT | Performed by: INTERNAL MEDICINE

## 2017-02-10 PROCEDURE — 83615 LACTATE (LD) (LDH) ENZYME: CPT | Performed by: INTERNAL MEDICINE

## 2017-02-10 PROCEDURE — 85045 AUTOMATED RETICULOCYTE COUNT: CPT | Performed by: INTERNAL MEDICINE

## 2017-02-10 PROCEDURE — 25000125 ZZHC RX 250: Mod: ZF | Performed by: PHYSICIAN ASSISTANT

## 2017-02-10 PROCEDURE — 85610 PROTHROMBIN TIME: CPT | Performed by: INTERNAL MEDICINE

## 2017-02-10 PROCEDURE — 86900 BLOOD TYPING SEROLOGIC ABO: CPT | Performed by: INTERNAL MEDICINE

## 2017-02-10 PROCEDURE — 86901 BLOOD TYPING SEROLOGIC RH(D): CPT | Performed by: INTERNAL MEDICINE

## 2017-02-10 PROCEDURE — 40000611 ZZHCL STATISTIC MORPHOLOGY W/INTERP HEMEPATH TC 85060: Performed by: PHYSICIAN ASSISTANT

## 2017-02-10 PROCEDURE — 80053 COMPREHEN METABOLIC PANEL: CPT | Performed by: INTERNAL MEDICINE

## 2017-02-10 PROCEDURE — 36430 TRANSFUSION BLD/BLD COMPNT: CPT

## 2017-02-10 PROCEDURE — 85025 COMPLETE CBC W/AUTO DIFF WBC: CPT | Performed by: INTERNAL MEDICINE

## 2017-02-10 PROCEDURE — P9040 RBC LEUKOREDUCED IRRADIATED: HCPCS | Performed by: INTERNAL MEDICINE

## 2017-02-10 PROCEDURE — 40000268 ZZH STATISTIC NO CHARGES: Mod: ZF

## 2017-02-10 PROCEDURE — 99214 OFFICE O/P EST MOD 30 MIN: CPT | Mod: ZP | Performed by: PHYSICIAN ASSISTANT

## 2017-02-10 PROCEDURE — 86850 RBC ANTIBODY SCREEN: CPT | Performed by: INTERNAL MEDICINE

## 2017-02-10 RX ORDER — PREDNISONE 10 MG/1
40 TABLET ORAL DAILY
Qty: 180 TABLET | Refills: 3 | COMMUNITY
Start: 2017-02-10 | End: 2017-02-24

## 2017-02-10 RX ORDER — HEPARIN SODIUM,PORCINE 10 UNIT/ML
5 VIAL (ML) INTRAVENOUS
Status: DISCONTINUED | OUTPATIENT
Start: 2017-02-10 | End: 2017-02-10 | Stop reason: HOSPADM

## 2017-02-10 RX ORDER — CYCLOSPORINE 25 MG/1
50 CAPSULE, LIQUID FILLED ORAL 2 TIMES DAILY
Qty: 120 CAPSULE | Refills: 0 | COMMUNITY
Start: 2017-02-10 | End: 2017-02-15

## 2017-02-10 RX ORDER — CYCLOSPORINE 100 MG/1
CAPSULE, LIQUID FILLED ORAL
Qty: 120 CAPSULE | Refills: 0 | COMMUNITY
Start: 2017-02-10 | End: 2017-02-15

## 2017-02-10 RX ORDER — CYCLOSPORINE 25 MG/1
75 CAPSULE, LIQUID FILLED ORAL 2 TIMES DAILY
Qty: 180 CAPSULE | Refills: 0 | Status: SHIPPED | OUTPATIENT
Start: 2017-02-10 | End: 2017-02-10

## 2017-02-10 RX ADMIN — SODIUM CHLORIDE, PRESERVATIVE FREE 5 ML: 5 INJECTION INTRAVENOUS at 09:31

## 2017-02-10 RX ADMIN — SODIUM CHLORIDE, PRESERVATIVE FREE 5 ML: 5 INJECTION INTRAVENOUS at 09:30

## 2017-02-10 ASSESSMENT — PAIN SCALES - GENERAL: PAINLEVEL: MODERATE PAIN (4)

## 2017-02-10 NOTE — PROGRESS NOTES
Infusion Nursing Note:  Bonny Raymundo presents today for 1 unit RBC and 1 platelets.      Patient seen by provider today: Yes: DOROTA Patricia    Note: Patient presents to clinic with no questions.  TORB 2/10/2017 1050 DOROTA Molina/EMILY Franco, RN: Instruct patient to hold cyclosporine today and start 150mg BID tomorrow.  Patient verbalized understanding.  Patient with asymptomatic elevated BP after RBC infusion; 150s/90s.  TORB 2/10/2017 1513 DOROTA Molina/EMILY Franco, RN: Okay to discharge patient if asymptomatic with SBP less than 160 and DBP less than 100.  Patient discharged, instructed to call or ED with any s/s hypertension.      Intravenous Access:  PICC.  Will have dressing changed at [facility] 2/12/17.    Treatment Conditions:  HGB      7.7   2/10/2017  WBC      2.4   2/10/2017   ANEU      0.7   2/10/2017  PLT       24   2/10/2017     NA      138   2/10/2017                POTASSIUM      4.4   2/10/2017        MAG      1.4   1/25/2017         CR     1.47   2/10/2017                LEO      9.0   2/10/2017             BILITOTAL      2.6   2/10/2017        ALBUMIN      3.2   2/10/2017                 ALT       31   2/10/2017        AST       16   2/10/2017  Results reviewed, labs MET treatment parameters, ok to proceed with treatment.  Blood transfusion consent signed 10/6/2016.    Post Infusion Assessment:  Patient tolerated infusion without incident.  Blood return noted pre and post infusion.  Site patent and intact, free from redness, edema or discomfort.  No evidence of extravasations.    Discharge Plan:   Patient declined prescription refills.  Discharge instructions reviewed with: Patient.  Patient and/or family verbalized understanding of discharge instructions and all questions answered.  Copy of AVS reviewed with patient and/or family.  Patient will return 2/12/2017 for next appointment.  Departure Mode: Wheelchair.    Natalie Franco RN

## 2017-02-10 NOTE — PROGRESS NOTES
Chief Complaint   Patient presents with     Blood Draw     Patient is here today for labs to be drawn via her PICC by RN. Vitals charted, and patient checked into her appt.     Nena Larson RN

## 2017-02-10 NOTE — Clinical Note
2/10/2017       RE: Bonny Raymundo  [address]  [facility] [address]     Dear Colleague,    Thank you for referring your patient, Bonny Raymundo, to the [facility]. Please see a copy of my visit note below.    No notes on file    Again, thank you for allowing me to participate in the care of your patient.      Sincerely,    Celine Walls PA-C

## 2017-02-10 NOTE — NURSING NOTE
Chief Complaint   Patient presents with     Blood Draw     Patient is here today for labs to be drawn via her PICC by RN. Vitals charted, and patient checked into her appt.     Oncology Clinic Visit     Return for Aplastic Anemia

## 2017-02-10 NOTE — NURSING NOTE
"Bonny Raymundo is a 71 year old female who presents for:  Chief Complaint   Patient presents with     Blood Draw     Patient is here today for labs to be drawn via her PICC by RN. Vitals charted, and patient checked into her appt.     Oncology Clinic Visit     Return for Aplastic Anemia         Initial Vitals:  /58 mmHg  Pulse 98  Temp(Src) 97.4  F (36.3  C)  SpO2 92% Estimated body mass index is 23.57 kg/(m^2) as calculated from the following:    Height as of 1/23/17: 1.6 m (5' 3\").    Weight as of 2/2/17: 60.328 kg (133 lb).. There is no height or weight on file to calculate BSA. BP completed using cuff size: NA (Not Taken)  Moderate Pain (4) No LMP recorded. Patient has had a hysterectomy. Allergies and medications reviewed.     Medications: Medication refills not needed today.  Pharmacy name entered into [facility]:    [facility], [place] - [address]  [facility], [place] - [address]    Comments: pt florentino t will to stand for WT    6  minutes for nursing intake (face to face time)   Janeth Schilling MA          "

## 2017-02-10 NOTE — PATIENT INSTRUCTIONS
Contact Numbers    [facility] Main Line: [phone]  [facility] Triage:  [phone]    Call triage with chills and/or temperature greater than or equal to 100.5, uncontrolled nausea/vomiting, diarrhea, constipation, dizziness, shortness of breath, chest pain, bleeding, unexplained bruising, or any new/concerning symptoms, questions/concerns.     If you are having any concerning symptoms or wish to speak to a provider before your next infusion visit, please call your care coordinator or triage to notify them so we can adequately serve you.       After Hours: [phone]    If after hours, weekends, or holidays, call main hospital  and ask for Oncology doctor on call.         February 2017 Sunday Monday Tuesday Wednesday Thursday Friday Saturday                  1     2     [facility] LAB DRAW   11:30 AM   (15 min.)    [facility]   [facility]     [facility] ONC INFUSION 360   12:00 PM   (360 min.)   [facility]    3:30 PM   (30 min.)   Rafita Rogel MD   [facility] Trans[facility] 3     4       5     Outpatient Visit    8:36 AM   Manpreet Mcdaniel MD   [facility]     LEVEL 5    9:00 AM   (300 min.)   SOUTH CHAIR 4   [facility] 6     7     Outpatient Visit    8:15 AM   [facility]     LEVEL 5    9:00 AM   (300 min.)   SOUTH CHAIR 2   [facility] 8     9     10     [facility] DRAW    9:00 AM   (15 min.)    [facility] LAB DRAW   [facility] Draw     [facility] RETURN    9:30 AM   (50 min.)   Celine Walls PA-C   [facility]   10:30 AM   (360 min.)   [facility]   [facility] 11       12     LEVEL 5    8:30 AM   (300 min.)   SOUTH CHAIR 5   [facility] 13     14     LEVEL 5    9:00 AM   (300 min.)   NORTH CHAIR 11   [facility] 15     16     17     [facility] DRAW    9:30 AM   (15 min.)    [facility] LAB DRAW   [facility] Draw     [facility] RETURN   10:20 AM   (50 min.)   Celine Walls PA-C [facility]     [facility] ONC INFUSION 360   10:30 AM   (360 min.)   [facility]FUSION   [facility] 18       19     LEVEL 5    8:30 AM   (300 min.)   SOUTH CHAIR 3   [facility] Infusion Center 20     21     LEVEL 5    9:00 AM   (300 min.)   SOUTH CHAIR 8   [facility] and [facility]fusion Center 22     23     24     P [facility] LAB DRAW    9:30 AM   (15 min.)    [facility]     [facility]   10:20 AM   (50 min.)   Celine Walls PA-C   [facility]     [facility] ONC INFUSION 360   11:00 AM   (360 min.)    ONCOLOGY INFUSION   [facility] 25       26     27     28 March 2017 Sunday Monday Tuesday Wednesday Thursday Friday Saturday                  1     2     3     4       5     6     7     8     9     [facility] ONC RETURN    3:00 PM   (30 min.)   Rafita Rogel MD   [facility] Blood and [facility] Transplant 10     11       12     13     14     15     16     17     18       19     20     21     22     23     24     25       26     27     28     29     30     31                       Lab Results:  Recent Results (from the past 12 hour(s))   Platelets prepare order unit    Collection Time: 02/10/17  1:00 AM   Result Value Ref Range    Ordered Component Type PLT Pheresis     Units Ordered 1    Blood component    Collection Time: 02/10/17  1:00 AM   Result Value Ref Range    Unit Number [id number]     Blood Component Type PlateletPheresis LeukoReduced Irradiated     Division Number 00     Status of Unit Released to care unit 02/10/2017 1255     Blood Product Code W2074G19     Unit Status ISS    CBC with platelets differential    Collection Time:  02/10/17  9:22 AM   Result Value Ref Range    WBC 2.4 (L) 4.0 - 11.0 10e9/L    RBC Count 2.50 (L) 3.8 - 5.2 10e12/L    Hemoglobin 7.7 (L) 11.7 - 15.7 g/dL    Hematocrit 22.1 (L) 35.0 - 47.0 %    MCV 88 78 - 100 fl    MCH 30.8 26.5 - 33.0 pg    MCHC 34.8 31.5 - 36.5 g/dL    RDW 17.2 (H) 10.0 - 15.0 %    Platelet Count 24 (LL) 150 - 450 10e9/L    Diff Method Automated Method     % Neutrophils 29.0 %    % Lymphocytes 64.3 %    % Monocytes 6.3 %    % Eosinophils 0.0 %    % Basophils 0.0 %    % Immature Granulocytes 0.4 %    Nucleated RBCs 4 (H) 0 /100    Absolute Neutrophil 0.7 (L) 1.6 - 8.3 10e9/L    Absolute Lymphocytes 1.5 0.8 - 5.3 10e9/L    Absolute Monocytes 0.2 0.0 - 1.3 10e9/L    Absolute Eosinophils 0.0 0.0 - 0.7 10e9/L    Absolute Basophils 0.0 0.0 - 0.2 10e9/L    Abs Immature Granulocytes 0.0 0 - 0.4 10e9/L    Absolute Nucleated RBC 0.1    INR    Collection Time: 02/10/17  9:22 AM   Result Value Ref Range    INR 0.97 0.86 - 1.14   Reticulocyte count    Collection Time: 02/10/17  9:22 AM   Result Value Ref Range    % Retic 0.7 0.5 - 2.0 %    Absolute Retic 18.5 (L) 25 - 95 10e9/L   ABO/Rh type and screen    Collection Time: 02/10/17  9:22 AM   Result Value Ref Range    Units Ordered 1     ABO A     RH(D)  Pos     Antibody Screen Neg     Test Valid Only At       [facility]    Specimen Expires 02/13/2017     Crossmatch Red Blood Cells    Comprehensive metabolic panel    Collection Time: 02/10/17  9:22 AM   Result Value Ref Range    Sodium 138 133 - 144 mmol/L    Potassium 4.4 3.4 - 5.3 mmol/L    Chloride 101 94 - 109 mmol/L    Carbon Dioxide 26 20 - 32 mmol/L    Anion Gap 12 3 - 14 mmol/L    Glucose 90 70 - 99 mg/dL    Urea Nitrogen 47 (H) 7 - 30 mg/dL    Creatinine 1.47 (H) 0.52 - 1.04 mg/dL    GFR Estimate 35 (L) >60 mL/min/1.7m2    GFR Estimate If Black 42 (L) >60 mL/min/1.7m2    Calcium 9.0 8.5 - 10.1 mg/dL    Bilirubin Total 2.6 (H) 0.2 - 1.3 mg/dL    Albumin  3.2 (L) 3.4 - 5.0 g/dL    Protein Total 7.2 6.8 - 8.8 g/dL    Alkaline Phosphatase 103 40 - 150 U/L    ALT 31 0 - 50 U/L    AST 16 0 - 45 U/L   Blood component    Collection Time: 02/10/17  9:22 AM   Result Value Ref Range    Unit Number [id number]     Blood Component Type Red Blood Cells LeukoReduced Irradiated     Division Number 00     Status of Unit Released to care unit 02/10/2017 1127     Blood Product Code [id number]     Unit Status ISS    Lactate Dehydrogenase    Collection Time: 02/10/17  9:22 AM   Result Value Ref Range    Lactate Dehydrogenase 330 (H) 81 - 234 U/L

## 2017-02-10 NOTE — MR AVS SNAPSHOT
After Visit Summary   2/10/2017    Bonny Raymundo    MRN: [id number]           Patient Information     Date Of Birth          1945        Visit Information        Provider Department      2/10/2017 10:30 AM  21 ATC;  ONCOLOGY INFUSION [facility]        Today's Diagnoses     Idiopathic aplastic anemia (H)    -  1     Pancytopenia (H)           Care Instructions    Contact Numbers    [facility] Main Line: [phone]  [facility] Triage:  [phone]    Call triage with chills and/or temperature greater than or equal to 100.5, uncontrolled nausea/vomiting, diarrhea, constipation, dizziness, shortness of breath, chest pain, bleeding, unexplained bruising, or any new/concerning symptoms, questions/concerns.     If you are having any concerning symptoms or wish to speak to a provider before your next infusion visit, please call your care coordinator or triage to notify them so we can adequately serve you.       After Hours: [phone]    If after hours, weekends, or holidays, call main hospital  and ask for Oncology doctor on call.         February 2017 Sunday Monday Tuesday Wednesday Thursday Friday Saturday                  1     2     [facility] LAB DRAW   11:30 AM   (15 min.)    [facility] LAB DRAW   [facility] Lab Draw     [facility] ONC INFUSION 360   12:00 PM   (360 min.)    ONCOLOGY INFUSION   [facility] ONC RETURN    3:30 PM   (30 min.)   Rafita Rogel MD   [facility] Blood and Marrow Transplant 3     4       5     Outpatient Visit    8:36 AM   Manpreet Mcdaniel MD   [facility]     LEVEL 5    9:00 AM   (300 min.)   SOUTH CHAIR 4   [facility] 6     7     Outpatient Visit    8:15 AM   [facility]     LEVEL 5    9:00 AM   (300 min.)   SOUTH CHAIR 2   [facility] 8     9     10     P [facility] LAB DRAW    9:00 AM   (15 min.)    [facility] LAB DRAW   M  [facility] Draw     [facility] RETURN    9:30 AM   (50 min.)   Celine Walls PA-C [facility]     [facility] ONC INFUSION 360   10:30 AM   (360 min.)   [facility] ONCOLOGY INFUSION   [facility] 11       12     LEVEL 5    8:30 AM   (300 min.)   SOUTH CHAIR 5   [facility] 13     14     LEVEL 5    9:00 AM   (300 min.)   NORTH CHAIR 11   [facility] 15     16     17     [facility] DRAW    9:30 AM   (15 min.)   [facility] DRAW   [facility] Draw     [facility] RETURN   10:20 AM   (50 min.)   Celine Walls PA-C [facility]     [facility] ONC INFUSION 360   10:30 AM   (360 min.)   [facility] ONCOLOGY INFUSION   [facility] 18       19     LEVEL 5    8:30 AM   (300 min.)   SOUTH CHAIR 3   [facility] 20     21     LEVEL 5    9:00 AM   (300 min.)   SOUTH CHAIR 8   [facility] 22     23     24     [facility] DRAW    9:30 AM   (15 min.)    [facility] LAB DRAW   [facility] Draw     [facility] RETURN   10:20 AM   (50 min.)   Celine Walls PA-C [facility]     [facility] ONC INFUSION 360   11:00 AM   (360 min.)   [facility] ONCOLOGY INFUSION   [facility] 25       26     27     28 March 2017 Sunday Monday Tuesday Wednesday Thursday Friday Saturday                  1     2     3     4       5     6     7     8     9     [facility] ONC RETURN    3:00 PM   (30 min.)   Rafita Rogel MD   [facility] Blood and Marrow Transplant 10     11       12     13     14     15     16     17     18       19     20     21     22     23     24     25       26     27     28     29     30     31                       Lab Results:  Recent Results (from the past 12 hour(s))   Platelets prepare order unit    Collection Time: 02/10/17  1:00 AM   Result Value Ref Range    Ordered  Component Type PLT Pheresis     Units Ordered 1    Blood component    Collection Time: 02/10/17  1:00 AM   Result Value Ref Range    Unit Number [id number]     Blood Component Type PlateletPheresis LeukoReduced Irradiated     Division Number 00     Status of Unit Released to care unit 02/10/2017 1255     Blood Product Code [id number]     Unit Status ISS    CBC with platelets differential    Collection Time: 02/10/17  9:22 AM   Result Value Ref Range    WBC 2.4 (L) 4.0 - 11.0 10e9/L    RBC Count 2.50 (L) 3.8 - 5.2 10e12/L    Hemoglobin 7.7 (L) 11.7 - 15.7 g/dL    Hematocrit 22.1 (L) 35.0 - 47.0 %    MCV 88 78 - 100 fl    MCH 30.8 26.5 - 33.0 pg    MCHC 34.8 31.5 - 36.5 g/dL    RDW 17.2 (H) 10.0 - 15.0 %    Platelet Count 24 (LL) 150 - 450 10e9/L    Diff Method Automated Method     % Neutrophils 29.0 %    % Lymphocytes 64.3 %    % Monocytes 6.3 %    % Eosinophils 0.0 %    % Basophils 0.0 %    % Immature Granulocytes 0.4 %    Nucleated RBCs 4 (H) 0 /100    Absolute Neutrophil 0.7 (L) 1.6 - 8.3 10e9/L    Absolute Lymphocytes 1.5 0.8 - 5.3 10e9/L    Absolute Monocytes 0.2 0.0 - 1.3 10e9/L    Absolute Eosinophils 0.0 0.0 - 0.7 10e9/L    Absolute Basophils 0.0 0.0 - 0.2 10e9/L    Abs Immature Granulocytes 0.0 0 - 0.4 10e9/L    Absolute Nucleated RBC 0.1    INR    Collection Time: 02/10/17  9:22 AM   Result Value Ref Range    INR 0.97 0.86 - 1.14   Reticulocyte count    Collection Time: 02/10/17  9:22 AM   Result Value Ref Range    % Retic 0.7 0.5 - 2.0 %    Absolute Retic 18.5 (L) 25 - 95 10e9/L   ABO/Rh type and screen    Collection Time: 02/10/17  9:22 AM   Result Value Ref Range    Units Ordered 1     ABO A     RH(D)  Pos     Antibody Screen Neg     Test Valid Only At       [facility]    Specimen Expires 02/13/2017     Crossmatch Red Blood Cells    Comprehensive metabolic panel    Collection Time: 02/10/17  9:22 AM   Result Value Ref Range    Sodium 138 133 - 144 mmol/L     Potassium 4.4 3.4 - 5.3 mmol/L    Chloride 101 94 - 109 mmol/L    Carbon Dioxide 26 20 - 32 mmol/L    Anion Gap 12 3 - 14 mmol/L    Glucose 90 70 - 99 mg/dL    Urea Nitrogen 47 (H) 7 - 30 mg/dL    Creatinine 1.47 (H) 0.52 - 1.04 mg/dL    GFR Estimate 35 (L) >60 mL/min/1.7m2    GFR Estimate If Black 42 (L) >60 mL/min/1.7m2    Calcium 9.0 8.5 - 10.1 mg/dL    Bilirubin Total 2.6 (H) 0.2 - 1.3 mg/dL    Albumin 3.2 (L) 3.4 - 5.0 g/dL    Protein Total 7.2 6.8 - 8.8 g/dL    Alkaline Phosphatase 103 40 - 150 U/L    ALT 31 0 - 50 U/L    AST 16 0 - 45 U/L   Blood component    Collection Time: 02/10/17  9:22 AM   Result Value Ref Range    Unit Number [id number]     Blood Component Type Red Blood Cells LeukoReduced Irradiated     Division Number 00     Status of Unit Released to care unit 02/10/2017 1127     Blood Product Code [id number]     Unit Status ISS    Lactate Dehydrogenase    Collection Time: 02/10/17  9:22 AM   Result Value Ref Range    Lactate Dehydrogenase 330 (H) 81 - 234 U/L               Follow-ups after your visit        Your next 10 appointments already scheduled     Feb 12, 2017  8:30 AM   Level 5 with SOUTH CHAIR 5   [facility] ([facility])    [facility]  [address]  [facility] [address]   [phone]            Feb 14, 2017  9:00 AM   Level 5 with NORTH CHAIR 11   [facility] ([facility])    [facility]  [address]  [facility] [address]   [phone]            Feb 17, 2017  9:30 AM   [facility] Lab Draw with  [facility] LAB DRAW   [facility] Draw ([facility])    [address]  [address] [facility] [address] [phone]            Feb 17, 2017 10:20 AM   (Arrive by 10:05 AM)   Return Visit with Celine Walls PA-C   [facility] ([facility])    [address]  [address] [facility] [address]   [phone]            Feb 17, 2017 10:30 AM   Infusion 360 with  ONCOLOGY INFUSION, UC 21 ATC   [facility] ([facility])    [address]  [address] [facility] [address]   [phone]            Feb 19, 2017  8:30 AM   Level 5 with SOUTH CHAIR 3   [facility] ([facility])    [facility]  [address]  [place] [address]   [phone]            Feb 21, 2017  9:00 AM   Level 5 with SOUTH CHAIR 8   [facility] ([facility])    [facility]  [address]  [place] [address]   [phone]            Feb 24, 2017  9:30 AM   [facility] Lab Draw with  MASONIC LAB DRAW   [facility] Lab Draw ([facility])    [address] [address]   [phone]            Feb 24, 2017 10:20 AM   (Arrive by 10:05 AM)   Return Visit with Celine Walls PA-C   [facility] ([facility])    [address] [place] [address]   [phone]              Future tests that were ordered for you today     Open Standing Orders        Priority Remaining Interval Expires Ordered    Transfuse red blood cell unit Routine 99/100 TRANSFUSE 1 UNIT  2/10/2017    Transfuse platelets unit Routine 99/100 TRANSFUSE 1 DOSE  2/10/2017    Red blood cell prepare order unit Routine 99/100 CONDITIONAL (SPECIFY) BLOOD  2/9/2017    Platelets prepare order unit Routine 99/100 CONDITIONAL (SPECIFY) BLOOD  2/9/2017          Open Future Orders        Priority Expected Expires Ordered    ABO/Rh type and screen Routine 2/14/2017 2/14/2017 2/10/2017    CBC with platelets differential Routine 2/14/2017 2/14/2017 2/10/2017    Comprehensive metabolic panel Routine  "2017 2017 2/10/2017    Cyclosporine Routine 2017 2017 2/10/2017            Who to contact     If you have questions or need follow up information about today's clinic visit or your schedule please contact [facility] directly at [phone].  Normal or non-critical lab and imaging results will be communicated to you by MyChart, letter or phone within 4 business days after the clinic has received the results. If you do not hear from us within 7 days, please contact the clinic through [employer] or phone. If you have a critical or abnormal lab result, we will notify you by phone as soon as possible.  Submit refill requests through [employer] or call your pharmacy and they will forward the refill request to us. Please allow 3 business days for your refill to be completed.          Additional Information About Your Visit        [employer] Information     [employer] lets you send messages to your doctor, view your test results, renew your prescriptions, schedule appointments and more. To sign up, go to [email or web address] . Click on \"Log in\" on the left side of the screen, which will take you to the Welcome page. Then click on \"Sign up Now\" on the right side of the page.     You will be asked to enter the access code listed below, as well as some personal information. Please follow the directions to create your username and password.     Your access code is: [email or web address]  Expires: 3/15/2017 11:47 AM     Your access code will  in 90 days. If you need help or a new code, please call your [place] clinic or [phone].        Care EveryWhere ID     This is your Care EveryWhere ID. This could be used by other organizations to access your [place] medical records  [id number]        Your Vitals Were     Pulse Temperature Respirations Pulse Oximetry          71 98.7  F (37.1  C) (Oral) 20 95%         Blood Pressure from Last 3 Encounters:   02/10/17 156/88   02/10/17 106/58   17 " 129/82    Weight from Last 3 Encounters:   02/02/17 60.328 kg (133 lb)   01/26/17 60.3 kg (132 lb 15 oz)   01/25/17 60.737 kg (133 lb 14.4 oz)              We Performed the Following     ABO/Rh type and screen     Bld morphology pathology review     Blood component     Blood component     CBC with platelets differential     Comprehensive metabolic panel     INR     Lactate Dehydrogenase     Obtain affirmation of informed consent     Platelets prepare order unit     Red blood cell prepare order unit     Reticulocyte count     Transfuse platelets unit     Transfuse red blood cell unit     Treatment Conditions          Today's Medication Changes          These changes are accurate as of: 2/10/17 12:58 PM.  If you have any questions, ask your nurse or doctor.               These medicines have changed or have updated prescriptions.        Dose/Directions    * cycloSPORINE modified 25 MG capsule   Commonly known as:  GENERIC EQUIVALENT   This may have changed:  You were already taking a medication with the same name, and this prescription was added. Make sure you understand how and when to take each.   Used for:  Idiopathic aplastic anemia (H)   Changed by:  Celine Walls PA-C        Dose:  50 mg   Take 2 capsules (50 mg) by mouth 2 times daily Along with one 100 mg capsules   Quantity:  120 capsule   Refills:  0       * cycloSPORINE modified 100 MG capsule   Commonly known as:  GENERIC EQUIVALENT   This may have changed:    - how much to take  - how to take this  - when to take this  - additional instructions   Used for:  Idiopathic aplastic anemia (H)   Changed by:  Celine Walls PA-C        Taking 150 mg BID. This reflects a dose change.   Quantity:  120 capsule   Refills:  0       predniSONE 10 MG tablet   Commonly known as:  DELTASONE   This may have changed:  how much to take   Used for:  Pancytopenia (H), Idiopathic aplastic anemia (H)   Changed by:  Celine Walls PA-C        Dose:  40 mg   Take  4 tablets (40 mg) by mouth daily Or as directed   Quantity:  180 tablet   Refills:  3       * Notice:  This list has 2 medication(s) that are the same as other medications prescribed for you. Read the directions carefully, and ask your doctor or other care provider to review them with you.             Primary Care Provider Office Phone # Fax #    Rafita Spencer Rogel [phone]        [facility] [address]  [facility] [address]        Thank you!     Thank you for choosing [facility]  for your care. Our goal is always to provide you with excellent care. Hearing back from our patients is one way we can continue to improve our services. Please take a few minutes to complete the written survey that you may receive in the mail after your visit with us. Thank you!             Your Updated Medication List - Protect others around you: Learn how to safely use, store and throw away your medicines at www.disposemymeds.org.          This list is accurate as of: 2/10/17 12:58 PM.  Always use your most recent med list.                   Brand Name Dispense Instructions for use    BENADRYL ALLERGY 25 MG tablet   Generic drug:  diphenhydrAMINE     56 tablet    Take 1 tablet (25 mg) by mouth nightly as needed       chlorpheniramine 4 MG tablet    CHLOR-TRIMETON     Take 4 mg by mouth every 6 hours as needed. For head cold       COMPRESSION STOCKINGS     2 each    Wear compression stockings at 20-30 mmHg rating most time during the day to the affected leg (left leg) or both legs. Take them off at night.       * cycloSPORINE modified 25 MG capsule    GENERIC EQUIVALENT    120 capsule    Take 2 capsules (50 mg) by mouth 2 times daily Along with one 100 mg capsules       * cycloSPORINE modified 100 MG capsule    GENERIC EQUIVALENT    120 capsule    Taking 150 mg BID. This reflects a dose change.       diclofenac 1 % Gel topical gel    VOLTAREN    100 g    Apply 2 g topically 2  times daily       eltrombopag 50 MG tablet    PROMACTA    180 tablet    Take 3 tablets (150 mg) by mouth daily Administer on an empty stomach, 1 hour before or 2 hours after a meal. Or as directed       fluconazole 200 MG tablet    DIFLUCAN    30 tablet    Take 1 tablet (200 mg) by mouth daily       levofloxacin 500 MG tablet    LEVAQUIN    30 tablet    Take 1 tablet (500 mg) by mouth daily       loratadine 10 MG tablet    CLARITIN     Take 10 mg by mouth daily as needed. For head cold       order for DME     1 Box    Equipment being ordered: knee high compression stockings- 18-20 mm       oxyCODONE 5 MG IR tablet    ROXICODONE    50 tablet    Take 1 tablet (5 mg) by mouth every 6 hours as needed for moderate to severe pain       pantoprazole 40 MG EC tablet    PROTONIX    30 tablet    Take 1 tablet (40 mg) by mouth every morning       polyethylene glycol Packet    MIRALAX/GLYCOLAX    14 packet    Take 17 g by mouth daily . If you start to have loose stools/diarrhea or multiple bowel movements, ok to hold this medication. Then resume if no bowel movement after 24-48hours.       predniSONE 10 MG tablet    DELTASONE    180 tablet    Take 4 tablets (40 mg) by mouth daily Or as directed       SENNA S 8.6-50 MG per tablet   Generic drug:  senna-docusate     100 tablet    Can take 1-2 tablets up to twice a day. Can start with 1-2 tablets daily. If no bowel movement within 24 hours, can take 1-2 tablets twice a day. HOLD if you develop diarrhea. Resume taking if no bowel movement in 24 hours.       TYLENOL PO      Take 325 mg by mouth every 6 hours as needed for mild pain or fever       * Notice:  This list has 2 medication(s) that are the same as other medications prescribed for you. Read the directions carefully, and ask your doctor or other care provider to review them with you.

## 2017-02-12 ENCOUNTER — HOSPITAL ENCOUNTER (OUTPATIENT)
Facility: CLINIC | Age: 72
Setting detail: SPECIMEN
Discharge: HOME | End: 2017-02-12
Attending: INTERNAL MEDICINE | Admitting: INTERNAL MEDICINE
Payer: COMMERCIAL

## 2017-02-12 ENCOUNTER — INFUSION THERAPY VISIT (OUTPATIENT)
Dept: INFUSION THERAPY | Facility: CLINIC | Age: 72
End: 2017-02-12
Attending: PHYSICIAN ASSISTANT
Payer: COMMERCIAL

## 2017-02-12 VITALS
HEART RATE: 79 BPM | RESPIRATION RATE: 20 BRPM | TEMPERATURE: 97.8 F | SYSTOLIC BLOOD PRESSURE: 120 MMHG | DIASTOLIC BLOOD PRESSURE: 79 MMHG | OXYGEN SATURATION: 95 %

## 2017-02-12 DIAGNOSIS — D61.9 APLASTIC ANEMIA (H): Primary | ICD-10-CM

## 2017-02-12 DIAGNOSIS — D61.818 PANCYTOPENIA (H): ICD-10-CM

## 2017-02-12 LAB
BASOPHILS # BLD AUTO: 0 10E9/L (ref 0–0.2)
BASOPHILS NFR BLD AUTO: 0 %
DIFFERENTIAL METHOD BLD: ABNORMAL
EOSINOPHIL # BLD AUTO: 0 10E9/L (ref 0–0.7)
EOSINOPHIL NFR BLD AUTO: 0 %
ERYTHROCYTE [DISTWIDTH] IN BLOOD BY AUTOMATED COUNT: 16.3 % (ref 10–15)
HCT VFR BLD AUTO: 26.8 % (ref 35–47)
HGB BLD-MCNC: 9.7 G/DL (ref 11.7–15.7)
LYMPHOCYTES # BLD AUTO: 2.2 10E9/L (ref 0.8–5.3)
LYMPHOCYTES NFR BLD AUTO: 69 %
MCH RBC QN AUTO: 30.6 PG (ref 26.5–33)
MCHC RBC AUTO-ENTMCNC: 36.2 G/DL (ref 31.5–36.5)
MCV RBC AUTO: 85 FL (ref 78–100)
METAMYELOCYTES # BLD: 0 10E9/L
METAMYELOCYTES NFR BLD MANUAL: 1 %
MONOCYTES # BLD AUTO: 0.1 10E9/L (ref 0–1.3)
MONOCYTES NFR BLD AUTO: 3 %
NEUTROPHILS # BLD AUTO: 0.9 10E9/L (ref 1.6–8.3)
NEUTROPHILS NFR BLD AUTO: 27 %
NRBC # BLD AUTO: 0.1 10*3/UL
NRBC BLD AUTO-RTO: 4 /100
PLATELET # BLD AUTO: 31 10E9/L (ref 150–450)
POIKILOCYTOSIS BLD QL SMEAR: SLIGHT
RBC # BLD AUTO: 3.17 10E12/L (ref 3.8–5.2)
TARGETS BLD QL SMEAR: SLIGHT
WBC # BLD AUTO: 3.2 10E9/L (ref 4–11)

## 2017-02-12 PROCEDURE — 36592 COLLECT BLOOD FROM PICC: CPT

## 2017-02-12 PROCEDURE — 85025 COMPLETE CBC W/AUTO DIFF WBC: CPT | Performed by: INTERNAL MEDICINE

## 2017-02-12 ASSESSMENT — PAIN SCALES - GENERAL: PAINLEVEL: MODERATE PAIN (4)

## 2017-02-12 NOTE — MR AVS SNAPSHOT
After Visit Summary   2/12/2017    Bonny Raymundo    MRN: [id number]           Patient Information     Date Of Birth          1945        Visit Information        Provider Department      2/12/2017 8:30 AM SOUTH CHAIR 5 [facility]        Today's Diagnoses     Aplastic anemia (H)    -  1    Pancytopenia (H)           Follow-ups after your visit        Follow-up notes from your care team     Return in 2 days (on 2/14/2017).      Your next 10 appointments already scheduled     Feb 14, 2017  9:00 AM CST   Level 5 with NORTH CHAIR 11   [facility] ([facility])    [facility]  [address]  [place] [address] [phone]            Feb 17, 2017  9:30 AM CST   Masonic Lab Draw with  MASONIC LAB DRAW   [facility] Lab Draw ([facility])    [address] [place] [address]  2nd [facility] [address]   [phone]            Feb 17, 2017 10:20 AM CST   (Arrive by 10:05 AM)   Return Visit with Celine Walls PA-C   [facility] ([facility])    [address]  2nd [facility] [address]   [phone]            Feb 17, 2017 10:30 AM CST   Infusion 360 with  ONCOLOGY INFUSION, UC 21 ATC   [facility] ([facility])    [address]  2nd Floor  [facility] [address]   [phone]            Feb 19, 2017  8:30 AM CST   Level 5 with SOUTH CHAIR 3   [facility] ([facility])    [facility] [place]  [address]  [place] [address] [phone]            Feb 21, 2017  9:00 AM CST   Level 5 with SOUTH CHAIR 8   [facility] ([facility])    [facility] [place]  [address]  [place] [address]  "  [phone]            Feb 24, 2017  9:30 AM CST   Masonic Lab Draw with  MASONIC LAB DRAW   [facility] Lab Draw ([facility])    [address] [place] [id number] [phone]            Feb 24, 2017 10:20 AM CST   (Arrive by 10:05 AM)   Return Visit with Celine Walls PA-C   [facility] ([facility])    [address] [place] [id number] [phone]            Feb 24, 2017 11:00 AM CST   Infusion 360 with  ONCOLOGY INFUSION   [facility] ([facility])    [address] [place] [id number] [phone]              Who to contact     If you have questions or need follow up information about today's clinic visit or your schedule please contact [facility] directly at [phone].  Normal or non-critical lab and imaging results will be communicated to you by Intoloophart, letter or phone within 4 business days after the clinic has received the results. If you do not hear from us within 7 days, please contact the clinic through SuperSonic Imaginet or phone. If you have a critical or abnormal lab result, we will notify you by phone as soon as possible.  Submit refill requests through Synetiq or call your pharmacy and they will forward the refill request to us. Please allow 3 business days for your refill to be completed.          Additional Information About Your Visit        Synetiq Information     Synetiq lets you send messages to your doctor, view your test results, renew your prescriptions, schedule appointments and more. To sign up, go to [email or web address] . Click on \"Log in\" on the left side of the screen, which will take you to the Welcome page. Then click on \"Sign up Now\" on the right side of the page.     You will be asked to enter the access code listed below, as well as some " personal information. Please follow the directions to create your username and password.     Your access code is: [email or web address]  Expires: 3/15/2017 11:47 AM     Your access code will  in 90 days. If you need help or a new code, please call your [facility] or [phone].        Care EveryWhere ID     This is your Care EveryWhere ID. This could be used by other organizations to access your [place] medical records  [id number]        Your Vitals Were     Pulse Temperature Respirations Pulse Oximetry          79 97.8  F (36.6  C) (Oral) 20 95%         Blood Pressure from Last 3 Encounters:   17 120/79   02/10/17 153/89   02/10/17 106/58    Weight from Last 3 Encounters:   17 60.3 kg (133 lb)   17 60.3 kg (132 lb 15 oz)   17 60.7 kg (133 lb 14.4 oz)              We Performed the Following     CBC with platelets differential        Primary Care Provider Office Phone # Fax #    Rafita Rogel [phone]        PHYSICIANS [address] [facility] [address]  [facility] [address]        Thank you!     Thank you for choosing [facility]  for your care. Our goal is always to provide you with excellent care. Hearing back from our patients is one way we can continue to improve our services. Please take a few minutes to complete the written survey that you may receive in the mail after your visit with us. Thank you!             Your Updated Medication List - Protect others around you: Learn how to safely use, store and throw away your medicines at www.disposemymeds.org.          This list is accurate as of: 17 10:48 AM.  Always use your most recent med list.                   Brand Name Dispense Instructions for use    BENADRYL ALLERGY 25 MG tablet   Generic drug:  diphenhydrAMINE     56 tablet    Take 1 tablet (25 mg) by mouth nightly as needed       chlorpheniramine 4 MG tablet    CHLOR-TRIMETON     Take 4 mg by mouth every 6 hours as  needed. For head cold       COMPRESSION STOCKINGS     2 each    Wear compression stockings at 20-30 mmHg rating most time during the day to the affected leg (left leg) or both legs. Take them off at night.       * cycloSPORINE modified 25 MG capsule    GENERIC EQUIVALENT    120 capsule    Take 2 capsules (50 mg) by mouth 2 times daily Along with one 100 mg capsules       * cycloSPORINE modified 100 MG capsule    GENERIC EQUIVALENT    120 capsule    Taking 150 mg BID. This reflects a dose change.       diclofenac 1 % Gel topical gel    VOLTAREN    100 g    Apply 2 g topically 2 times daily       eltrombopag 50 MG tablet    PROMACTA    180 tablet    Take 3 tablets (150 mg) by mouth daily Administer on an empty stomach, 1 hour before or 2 hours after a meal. Or as directed       fluconazole 200 MG tablet    DIFLUCAN    30 tablet    Take 1 tablet (200 mg) by mouth daily       levofloxacin 500 MG tablet    LEVAQUIN    30 tablet    Take 1 tablet (500 mg) by mouth daily       loratadine 10 MG tablet    CLARITIN     Take 10 mg by mouth daily as needed. For head cold       order for DME     1 Box    Equipment being ordered: knee high compression stockings- 18-20 mm       oxyCODONE 5 MG IR tablet    ROXICODONE    50 tablet    Take 1 tablet (5 mg) by mouth every 6 hours as needed for moderate to severe pain       pantoprazole 40 MG EC tablet    PROTONIX    30 tablet    Take 1 tablet (40 mg) by mouth every morning       polyethylene glycol Packet    MIRALAX/GLYCOLAX    14 packet    Take 17 g by mouth daily . If you start to have loose stools/diarrhea or multiple bowel movements, ok to hold this medication. Then resume if no bowel movement after 24-48hours.       predniSONE 10 MG tablet    DELTASONE    180 tablet    Take 4 tablets (40 mg) by mouth daily Or as directed       SENNA S 8.6-50 MG per tablet   Generic drug:  senna-docusate     100 tablet    Can take 1-2 tablets up to twice a day. Can start with 1-2 tablets daily. If  no bowel movement within 24 hours, can take 1-2 tablets twice a day. HOLD if you develop diarrhea. Resume taking if no bowel movement in 24 hours.       TYLENOL PO      Take 325 mg by mouth every 6 hours as needed for mild pain or fever       * Notice:  This list has 2 medication(s) that are the same as other medications prescribed for you. Read the directions carefully, and ask your doctor or other care provider to review them with you.

## 2017-02-12 NOTE — PROGRESS NOTES
Infusion Nursing Note:  Bonny Raymundo presents today for labs with possible transfusions.    Patient seen by provider today: No   present during visit today: Not Applicable.    Note: N/A.    Intravenous Access:  PICC.    Treatment Conditions:  Lab Results   Component Value Date    HGB 9.7 02/12/2017     Lab Results   Component Value Date    WBC 3.2 02/12/2017      Lab Results   Component Value Date    ANEU 0.9 02/12/2017     Lab Results   Component Value Date    PLT 31 02/12/2017      Results reviewed, labs did NOT meet treatment parameters: NO TRANSFUSIONS NEEDED.      Post Infusion Assessment:  Site patent and intact, free from redness, edema or discomfort.  No evidence of extravasations.    Discharge Plan:   AVS to patient via PanGenXHART.  Patient will return 2/14/17 for next appointment.   Patient discharged in stable condition accompanied by: self.  Departure Mode: Ambulatory.    James Parikh RN

## 2017-02-14 ENCOUNTER — INFUSION THERAPY VISIT (OUTPATIENT)
Dept: INFUSION THERAPY | Facility: CLINIC | Age: 72
End: 2017-02-14
Attending: PHYSICIAN ASSISTANT
Payer: COMMERCIAL

## 2017-02-14 ENCOUNTER — HOSPITAL ENCOUNTER (OUTPATIENT)
Facility: CLINIC | Age: 72
Setting detail: SPECIMEN
Discharge: HOME | End: 2017-02-14
Attending: PHYSICIAN ASSISTANT | Admitting: PHYSICIAN ASSISTANT
Payer: COMMERCIAL

## 2017-02-14 VITALS
OXYGEN SATURATION: 93 % | RESPIRATION RATE: 18 BRPM | SYSTOLIC BLOOD PRESSURE: 113 MMHG | HEART RATE: 83 BPM | TEMPERATURE: 98 F | DIASTOLIC BLOOD PRESSURE: 73 MMHG

## 2017-02-14 DIAGNOSIS — D61.818 PANCYTOPENIA (H): ICD-10-CM

## 2017-02-14 DIAGNOSIS — D61.3 IDIOPATHIC APLASTIC ANEMIA (H): Primary | ICD-10-CM

## 2017-02-14 LAB
ALBUMIN SERPL-MCNC: 2.8 G/DL (ref 3.4–5)
ALP SERPL-CCNC: 86 U/L (ref 40–150)
ALT SERPL W P-5'-P-CCNC: 33 U/L (ref 0–50)
ANION GAP SERPL CALCULATED.3IONS-SCNC: 9 MMOL/L (ref 3–14)
ANISOCYTOSIS BLD QL SMEAR: SLIGHT
AST SERPL W P-5'-P-CCNC: 15 U/L (ref 0–45)
BASOPHILS # BLD AUTO: 0 10E9/L (ref 0–0.2)
BASOPHILS NFR BLD AUTO: 0 %
BILIRUB SERPL-MCNC: 2.5 MG/DL (ref 0.2–1.3)
BLD PROD TYP BPU: NORMAL
BLD PROD TYP BPU: NORMAL
BLD UNIT ID BPU: 0
BLOOD PRODUCT CODE: NORMAL
BPU ID: NORMAL
BUN SERPL-MCNC: 59 MG/DL (ref 7–30)
CALCIUM SERPL-MCNC: 8.6 MG/DL (ref 8.5–10.1)
CHLORIDE SERPL-SCNC: 105 MMOL/L (ref 94–109)
CO2 SERPL-SCNC: 26 MMOL/L (ref 20–32)
CREAT SERPL-MCNC: 1.47 MG/DL (ref 0.52–1.04)
CYCLOSPORINE BLD LC/MS/MS-MCNC: 336 UG/L (ref 50–400)
DIFFERENTIAL METHOD BLD: ABNORMAL
EOSINOPHIL # BLD AUTO: 0 10E9/L (ref 0–0.7)
EOSINOPHIL NFR BLD AUTO: 0 %
ERYTHROCYTE [DISTWIDTH] IN BLOOD BY AUTOMATED COUNT: 16.6 % (ref 10–15)
GFR SERPL CREATININE-BSD FRML MDRD: 35 ML/MIN/1.7M2
GLUCOSE SERPL-MCNC: 126 MG/DL (ref 70–99)
HCT VFR BLD AUTO: 24 % (ref 35–47)
HGB BLD-MCNC: 8.6 G/DL (ref 11.7–15.7)
LYMPHOCYTES # BLD AUTO: 1.9 10E9/L (ref 0.8–5.3)
LYMPHOCYTES NFR BLD AUTO: 68 %
MCH RBC QN AUTO: 30.3 PG (ref 26.5–33)
MCHC RBC AUTO-ENTMCNC: 35.8 G/DL (ref 31.5–36.5)
MCV RBC AUTO: 85 FL (ref 78–100)
MONOCYTES # BLD AUTO: 0.1 10E9/L (ref 0–1.3)
MONOCYTES NFR BLD AUTO: 4 %
NEUTROPHILS # BLD AUTO: 0.8 10E9/L (ref 1.6–8.3)
NEUTROPHILS NFR BLD AUTO: 28 %
NRBC # BLD AUTO: 0.2 10*3/UL
NRBC BLD AUTO-RTO: 6 /100
NUM BPU REQUESTED: 1
PLATELET # BLD AUTO: 14 10E9/L (ref 150–450)
POLYCHROMASIA BLD QL SMEAR: SLIGHT
POTASSIUM SERPL-SCNC: 4 MMOL/L (ref 3.4–5.3)
PROT SERPL-MCNC: 7 G/DL (ref 6.8–8.8)
RBC # BLD AUTO: 2.84 10E12/L (ref 3.8–5.2)
SODIUM SERPL-SCNC: 140 MMOL/L (ref 133–144)
TARGETS BLD QL SMEAR: SLIGHT
TME LAST DOSE: NORMAL H
TRANSFUSION STATUS PATIENT QL: NORMAL
TRANSFUSION STATUS PATIENT QL: NORMAL
WBC # BLD AUTO: 2.8 10E9/L (ref 4–11)

## 2017-02-14 PROCEDURE — P9037 PLATE PHERES LEUKOREDU IRRAD: HCPCS | Performed by: PHYSICIAN ASSISTANT

## 2017-02-14 PROCEDURE — 85025 COMPLETE CBC W/AUTO DIFF WBC: CPT | Performed by: PHYSICIAN ASSISTANT

## 2017-02-14 PROCEDURE — 83010 ASSAY OF HAPTOGLOBIN QUANT: CPT | Performed by: PHYSICIAN ASSISTANT

## 2017-02-14 PROCEDURE — 80053 COMPREHEN METABOLIC PANEL: CPT | Performed by: PHYSICIAN ASSISTANT

## 2017-02-14 PROCEDURE — 36430 TRANSFUSION BLD/BLD COMPNT: CPT

## 2017-02-14 PROCEDURE — 80158 DRUG ASSAY CYCLOSPORINE: CPT | Performed by: PHYSICIAN ASSISTANT

## 2017-02-14 NOTE — MR AVS SNAPSHOT
After Visit Summary   2/14/2017    Bonny Raymundo    MRN: [id number]           Patient Information     Date Of Birth          1945        Visit Information        Provider Department      2/14/2017 9:00 AM NORTH CHAIR 11 [facility]        Today's Diagnoses     Idiopathic aplastic anemia (H)    -  1    Pancytopenia (H)           Follow-ups after your visit        Your next 10 appointments already scheduled     Feb 17, 2017  9:30 AM CST   Masonic Lab Draw with  MASONIC LAB DRAW   [facility] ([facility])    [address] [place] [address]   [phone]            Feb 17, 2017 10:20 AM CST   (Arrive by 10:05 AM)   Return Visit with Celine Walls PA-C   [facility] ([facility])    [address] [place] [address]   [phone]            Feb 17, 2017 10:30 AM CST   Infusion 360 with  ONCOLOGY INFUSION, UC 21 ATC   [facility] ([facility])    [address] [place] [address]   [phone]            Feb 19, 2017  8:30 AM CST   Level 5 with SOUTH CHAIR 3   [facility] ([facility])    [facility]  [address]  [place] [address]   [phone]            Feb 21, 2017  9:00 AM CST   Level 5 with SOUTH CHAIR 8   [facility] ([facility])    [facility]  [address]  [place] [address]   [phone]            Feb 24, 2017  9:30 AM CST   Masonic Lab Draw with  MASONIC LAB DRAW   [facility] ([facility])    [address] [place] [address]   [phone]            Feb 24, 2017 10:20 AM CST   (Arrive by 10:05 AM)   Return Visit with  "Celine Walls PA-C   [facility] ([facility] [place])    [address] [facility]  2nd [facility] [id number] [phone]            Feb 24, 2017 11:00 AM CST   Infusion 360 with UC ONCOLOGY INFUSION, UC 13 ATC   [facility] ([facility])    [address] [facility]  2nd [facility] [phone] [phone]              Future tests that were ordered for you today     Open Standing Orders        Priority Remaining Interval Expires Ordered    Platelets prepare order unit Routine 99/100 CONDITIONAL (SPECIFY) BLOOD  2/14/2017    Transfuse platelets unit Routine 99/100 TRANSFUSE 1 DOSE  2/14/2017            Who to contact     If you have questions or need follow up information about today's clinic visit or your schedule please contact [facility] IN[facility] directly at [phone].  Normal or non-critical lab and imaging results will be communicated to you by Convoehart, letter or phone within 4 business days after the clinic has received the results. If you do not hear from us within 7 days, please contact the clinic through Clinical Insightt or phone. If you have a critical or abnormal lab result, we will notify you by phone as soon as possible.  Submit refill requests through GoSpotCheck or call your pharmacy and they will forward the refill request to us. Please allow 3 business days for your refill to be completed.          Additional Information About Your Visit        GoSpotCheck Information     GoSpotCheck lets you send messages to your doctor, view your test results, renew your prescriptions, schedule appointments and more. To sign up, go to [email or web address] . Click on \"Log in\" on the left side of the screen, which will take you to the Welcome page. Then click on \"Sign up Now\" on the right side of the page.     You will be asked to enter the access code listed below, as well as some personal information. " Please follow the directions to create your username and password.     Your access code is: [email or web address]  Expires: 3/15/2017 11:47 AM     Your access code will  in 90 days. If you need help or a new code, please call your [place] clinic or [phone].        Care EveryWhere ID     This is your Care EveryWhere ID. This could be used by other organizations to access your [place] medical records  [id number]        Your Vitals Were     Pulse Temperature Respirations Pulse Oximetry          83 98  F (36.7  C) (Oral) 18 93%         Blood Pressure from Last 3 Encounters:   17 113/73   17 120/79   02/10/17 153/89    Weight from Last 3 Encounters:   17 60.3 kg (133 lb)   17 60.3 kg (132 lb 15 oz)   17 60.7 kg (133 lb 14.4 oz)              We Performed the Following     Blood component     CBC with platelets differential     Comprehensive metabolic panel     Cyclosporine     Haptoglobin     Obtain affirmation of informed consent     Platelets prepare order unit     Transfuse platelets unit     Treatment Conditions        Primary Care Provider Office Phone # Fax #    Rafita Rogel [phone]        PHYSICIANS [address] [facility] [address]  [facility] [address]        Thank you!     Thank you for choosing [facility]  for your care. Our goal is always to provide you with excellent care. Hearing back from our patients is one way we can continue to improve our services. Please take a few minutes to complete the written survey that you may receive in the mail after your visit with us. Thank you!             Your Updated Medication List - Protect others around you: Learn how to safely use, store and throw away your medicines at www.disposemymeds.org.          This list is accurate as of: 17 12:33 PM.  Always use your most recent med list.                   Brand Name Dispense Instructions for use    BENADRYL ALLERGY 25 MG tablet    Generic drug:  diphenhydrAMINE     56 tablet    Take 1 tablet (25 mg) by mouth nightly as needed       chlorpheniramine 4 MG tablet    CHLOR-TRIMETON     Take 4 mg by mouth every 6 hours as needed. For head cold       COMPRESSION STOCKINGS     2 each    Wear compression stockings at 20-30 mmHg rating most time during the day to the affected leg (left leg) or both legs. Take them off at night.       * cycloSPORINE modified 25 MG capsule    GENERIC EQUIVALENT    120 capsule    Take 2 capsules (50 mg) by mouth 2 times daily Along with one 100 mg capsules       * cycloSPORINE modified 100 MG capsule    GENERIC EQUIVALENT    120 capsule    Taking 150 mg BID. This reflects a dose change.       diclofenac 1 % Gel topical gel    VOLTAREN    100 g    Apply 2 g topically 2 times daily       eltrombopag 50 MG tablet    PROMACTA    180 tablet    Take 3 tablets (150 mg) by mouth daily Administer on an empty stomach, 1 hour before or 2 hours after a meal. Or as directed       fluconazole 200 MG tablet    DIFLUCAN    30 tablet    Take 1 tablet (200 mg) by mouth daily       levofloxacin 500 MG tablet    LEVAQUIN    30 tablet    Take 1 tablet (500 mg) by mouth daily       loratadine 10 MG tablet    CLARITIN     Take 10 mg by mouth daily as needed. For head cold       order for DME     1 Box    Equipment being ordered: knee high compression stockings- 18-20 mm       oxyCODONE 5 MG IR tablet    ROXICODONE    50 tablet    Take 1 tablet (5 mg) by mouth every 6 hours as needed for moderate to severe pain       pantoprazole 40 MG EC tablet    PROTONIX    30 tablet    Take 1 tablet (40 mg) by mouth every morning       polyethylene glycol Packet    MIRALAX/GLYCOLAX    14 packet    Take 17 g by mouth daily . If you start to have loose stools/diarrhea or multiple bowel movements, ok to hold this medication. Then resume if no bowel movement after 24-48hours.       predniSONE 10 MG tablet    DELTASONE    180 tablet    Take 4 tablets (40 mg)  by mouth daily Or as directed       SENNA S 8.6-50 MG per tablet   Generic drug:  senna-docusate     100 tablet    Can take 1-2 tablets up to twice a day. Can start with 1-2 tablets daily. If no bowel movement within 24 hours, can take 1-2 tablets twice a day. HOLD if you develop diarrhea. Resume taking if no bowel movement in 24 hours.       TYLENOL PO      Take 325 mg by mouth every 6 hours as needed for mild pain or fever       * Notice:  This list has 2 medication(s) that are the same as other medications prescribed for you. Read the directions carefully, and ask your doctor or other care provider to review them with you.

## 2017-02-14 NOTE — PROGRESS NOTES
Infusion Nursing Note:  Bonny Raymundo presents today for labs with possible transfusions.    Patient seen by provider today: No   present during visit today: Not Applicable.    Note: N/A.    Intravenous Access:  PICC.    Treatment Conditions:  Lab Results   Component Value Date    HGB 8.6 02/14/2017     Lab Results   Component Value Date    WBC 2.8 02/14/2017      Lab Results   Component Value Date    ANEU 0.9 02/12/2017     Lab Results   Component Value Date    PLT 14 02/14/2017      Blood transfusion consent signed 10/6/16.      Post Infusion Assessment:  Patient tolerated infusion without incident.  Blood return noted pre and post infusion.  Site patent and intact, free from redness, edema or discomfort.  No evidence of extravasations.    Discharge Plan:   Discharge instructions reviewed with: Patient.  Patient and/or family verbalized understanding of discharge instructions and all questions answered.  Copy of AVS reviewed with patient and/or family.  Patient will return 2/19 for next appointment.  Patient discharged in stable condition accompanied by: self.  Departure Mode: Ambulatory.    ANABELLE Valiente RN (discharge)

## 2017-02-15 DIAGNOSIS — D61.3 IDIOPATHIC APLASTIC ANEMIA (H): ICD-10-CM

## 2017-02-15 RX ORDER — CYCLOSPORINE 25 MG/1
25 CAPSULE, LIQUID FILLED ORAL 2 TIMES DAILY
Qty: 120 CAPSULE | Refills: 0 | COMMUNITY
Start: 2017-02-15 | End: 2017-02-17

## 2017-02-15 RX ORDER — CYCLOSPORINE 100 MG/1
100 CAPSULE, LIQUID FILLED ORAL 2 TIMES DAILY
Qty: 120 CAPSULE | Refills: 0 | COMMUNITY
Start: 2017-02-15 | End: 2017-02-24

## 2017-02-16 ENCOUNTER — HOSPITAL ENCOUNTER (OUTPATIENT)
Facility: CLINIC | Age: 72
Setting detail: SPECIMEN
End: 2017-02-16
Attending: PHYSICIAN ASSISTANT

## 2017-02-16 LAB — HAPTOGLOB SERPL-MCNC: 101 MG/DL (ref 35–175)

## 2017-02-17 ENCOUNTER — INFUSION THERAPY VISIT (OUTPATIENT)
Dept: ONCOLOGY | Facility: CLINIC | Age: 72
End: 2017-02-17
Attending: INTERNAL MEDICINE
Payer: COMMERCIAL

## 2017-02-17 ENCOUNTER — APPOINTMENT (OUTPATIENT)
Dept: LAB | Facility: CLINIC | Age: 72
End: 2017-02-17
Attending: INTERNAL MEDICINE
Payer: COMMERCIAL

## 2017-02-17 VITALS
SYSTOLIC BLOOD PRESSURE: 152 MMHG | TEMPERATURE: 97.8 F | OXYGEN SATURATION: 94 % | HEART RATE: 75 BPM | RESPIRATION RATE: 18 BRPM | DIASTOLIC BLOOD PRESSURE: 93 MMHG

## 2017-02-17 VITALS
HEART RATE: 96 BPM | WEIGHT: 132.2 LBS | SYSTOLIC BLOOD PRESSURE: 102 MMHG | RESPIRATION RATE: 18 BRPM | BODY MASS INDEX: 23.42 KG/M2 | OXYGEN SATURATION: 92 % | DIASTOLIC BLOOD PRESSURE: 66 MMHG | TEMPERATURE: 97.4 F

## 2017-02-17 DIAGNOSIS — M54.50 LEFT-SIDED LOW BACK PAIN WITHOUT SCIATICA, UNSPECIFIED CHRONICITY: ICD-10-CM

## 2017-02-17 DIAGNOSIS — D61.3 IDIOPATHIC APLASTIC ANEMIA (H): ICD-10-CM

## 2017-02-17 DIAGNOSIS — B37.0 ORAL THRUSH: Primary | ICD-10-CM

## 2017-02-17 DIAGNOSIS — D61.818 PANCYTOPENIA (H): Primary | ICD-10-CM

## 2017-02-17 DIAGNOSIS — D61.3 IDIOPATHIC APLASTIC ANEMIA (H): Primary | ICD-10-CM

## 2017-02-17 LAB
ABO + RH BLD: NORMAL
ABO + RH BLD: NORMAL
ALBUMIN SERPL-MCNC: 2.8 G/DL (ref 3.4–5)
ALBUMIN SERPL-MCNC: 2.8 G/DL (ref 3.4–5)
ALP SERPL-CCNC: 77 U/L (ref 40–150)
ALP SERPL-CCNC: 80 U/L (ref 40–150)
ALT SERPL W P-5'-P-CCNC: 23 U/L (ref 0–50)
ALT SERPL W P-5'-P-CCNC: <60 U/L (ref 0–50)
ANION GAP SERPL CALCULATED.3IONS-SCNC: 11 MMOL/L (ref 3–14)
ANION GAP SERPL CALCULATED.3IONS-SCNC: 9 MMOL/L (ref 3–14)
AST SERPL W P-5'-P-CCNC: 23 U/L (ref 0–45)
AST SERPL W P-5'-P-CCNC: 26 U/L (ref 0–45)
BASOPHILS # BLD AUTO: 0 10E9/L (ref 0–0.2)
BASOPHILS NFR BLD AUTO: 0 %
BILIRUB SERPL-MCNC: 2.3 MG/DL (ref 0.2–1.3)
BILIRUB SERPL-MCNC: 2.4 MG/DL (ref 0.2–1.3)
BLD GP AB SCN SERPL QL: NORMAL
BLD PROD TYP BPU: NORMAL
BLD UNIT ID BPU: 0
BLD UNIT ID BPU: 0
BLOOD BANK CMNT PATIENT-IMP: NORMAL
BLOOD PRODUCT CODE: NORMAL
BLOOD PRODUCT CODE: NORMAL
BPU ID: NORMAL
BPU ID: NORMAL
BUN SERPL-MCNC: 47 MG/DL (ref 7–30)
BUN SERPL-MCNC: 49 MG/DL (ref 7–30)
CALCIUM SERPL-MCNC: 8.2 MG/DL (ref 8.5–10.1)
CALCIUM SERPL-MCNC: 8.3 MG/DL (ref 8.5–10.1)
CHLORIDE SERPL-SCNC: 105 MMOL/L (ref 94–109)
CHLORIDE SERPL-SCNC: 105 MMOL/L (ref 94–109)
CO2 SERPL-SCNC: 24 MMOL/L (ref 20–32)
CO2 SERPL-SCNC: 25 MMOL/L (ref 20–32)
CREAT SERPL-MCNC: 1.29 MG/DL (ref 0.52–1.04)
CREAT SERPL-MCNC: 1.36 MG/DL (ref 0.52–1.04)
DIFFERENTIAL METHOD BLD: ABNORMAL
EOSINOPHIL # BLD AUTO: 0 10E9/L (ref 0–0.7)
EOSINOPHIL NFR BLD AUTO: 0 %
ERYTHROCYTE [DISTWIDTH] IN BLOOD BY AUTOMATED COUNT: 17.4 % (ref 10–15)
GFR SERPL CREATININE-BSD FRML MDRD: 38 ML/MIN/1.7M2
GFR SERPL CREATININE-BSD FRML MDRD: 40 ML/MIN/1.7M2
GLUCOSE SERPL-MCNC: 133 MG/DL (ref 70–99)
GLUCOSE SERPL-MCNC: 155 MG/DL (ref 70–99)
HCT VFR BLD AUTO: 22.9 % (ref 35–47)
HGB BLD-MCNC: 8 G/DL (ref 11.7–15.7)
IMM GRANULOCYTES # BLD: 0 10E9/L (ref 0–0.4)
IMM GRANULOCYTES NFR BLD: 1 %
LYMPHOCYTES # BLD AUTO: 1.9 10E9/L (ref 0.8–5.3)
LYMPHOCYTES NFR BLD AUTO: 64.2 %
MCH RBC QN AUTO: 30.5 PG (ref 26.5–33)
MCHC RBC AUTO-ENTMCNC: 34.9 G/DL (ref 31.5–36.5)
MCV RBC AUTO: 87 FL (ref 78–100)
MONOCYTES # BLD AUTO: 0.2 10E9/L (ref 0–1.3)
MONOCYTES NFR BLD AUTO: 7 %
NEUTROPHILS # BLD AUTO: 0.8 10E9/L (ref 1.6–8.3)
NEUTROPHILS NFR BLD AUTO: 27.8 %
NRBC # BLD AUTO: 0.1 10*3/UL
NRBC BLD AUTO-RTO: 5 /100
NUM BPU REQUESTED: 1
NUM BPU REQUESTED: 1
PLATELET # BLD AUTO: 9 10E9/L (ref 150–450)
POTASSIUM SERPL-SCNC: 4.1 MMOL/L (ref 3.4–5.3)
POTASSIUM SERPL-SCNC: 6.4 MMOL/L (ref 3.4–5.3)
POTASSIUM SERPL-SCNC: 6.5 MMOL/L (ref 3.4–5.3)
PROT SERPL-MCNC: 6.8 G/DL (ref 6.8–8.8)
PROT SERPL-MCNC: 7 G/DL (ref 6.8–8.8)
RBC # BLD AUTO: 2.62 10E12/L (ref 3.8–5.2)
SODIUM SERPL-SCNC: 139 MMOL/L (ref 133–144)
SODIUM SERPL-SCNC: 140 MMOL/L (ref 133–144)
SPECIMEN EXP DATE BLD: NORMAL
TRANSFUSION STATUS PATIENT QL: NORMAL
WBC # BLD AUTO: 3 10E9/L (ref 4–11)

## 2017-02-17 PROCEDURE — 36430 TRANSFUSION BLD/BLD COMPNT: CPT

## 2017-02-17 PROCEDURE — 86850 RBC ANTIBODY SCREEN: CPT | Performed by: INTERNAL MEDICINE

## 2017-02-17 PROCEDURE — 86900 BLOOD TYPING SEROLOGIC ABO: CPT | Performed by: INTERNAL MEDICINE

## 2017-02-17 PROCEDURE — 80053 COMPREHEN METABOLIC PANEL: CPT | Performed by: PHYSICIAN ASSISTANT

## 2017-02-17 PROCEDURE — 84132 ASSAY OF SERUM POTASSIUM: CPT | Mod: 91 | Performed by: PHYSICIAN ASSISTANT

## 2017-02-17 PROCEDURE — 86923 COMPATIBILITY TEST ELECTRIC: CPT | Performed by: INTERNAL MEDICINE

## 2017-02-17 PROCEDURE — P9037 PLATE PHERES LEUKOREDU IRRAD: HCPCS | Performed by: PHYSICIAN ASSISTANT

## 2017-02-17 PROCEDURE — P9040 RBC LEUKOREDUCED IRRADIATED: HCPCS | Performed by: INTERNAL MEDICINE

## 2017-02-17 PROCEDURE — 36415 COLL VENOUS BLD VENIPUNCTURE: CPT | Mod: ZF

## 2017-02-17 PROCEDURE — 85025 COMPLETE CBC W/AUTO DIFF WBC: CPT | Performed by: INTERNAL MEDICINE

## 2017-02-17 PROCEDURE — 86901 BLOOD TYPING SEROLOGIC RH(D): CPT | Performed by: INTERNAL MEDICINE

## 2017-02-17 PROCEDURE — 99214 OFFICE O/P EST MOD 30 MIN: CPT | Mod: ZP | Performed by: PHYSICIAN ASSISTANT

## 2017-02-17 RX ORDER — OXYCODONE HYDROCHLORIDE 5 MG/1
5 TABLET ORAL EVERY 6 HOURS PRN
Qty: 50 TABLET | Refills: 0 | Status: SHIPPED | OUTPATIENT
Start: 2017-02-17 | End: 2017-04-04

## 2017-02-17 RX ORDER — CYCLOSPORINE 25 MG/1
25 CAPSULE, LIQUID FILLED ORAL 2 TIMES DAILY
Qty: 60 CAPSULE | Refills: 0 | Status: SHIPPED | OUTPATIENT
Start: 2017-02-17 | End: 2017-02-24 | Stop reason: DRUGHIGH

## 2017-02-17 RX ORDER — NYSTATIN 100000/ML
500000 SUSPENSION, ORAL (FINAL DOSE FORM) ORAL 4 TIMES DAILY
Qty: 280 ML | Refills: 1 | Status: SHIPPED | OUTPATIENT
Start: 2017-02-17 | End: 2017-05-04

## 2017-02-17 ASSESSMENT — PAIN SCALES - GENERAL: PAINLEVEL: MODERATE PAIN (4)

## 2017-02-17 NOTE — PROGRESS NOTES
[facility]  FOLLOW-UP VISIT NOTE  Date of visit: 2-17-17          REASON FOR VISIT: Aplastic anemia, weekly follow up    HPI: Bonny is a 71 year old female who presented in the fall of 2016 with fatigue and shortness of breath. 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  Her counts showed pancytopenia and BMBX was concerning for aplastic anemia.  By December of 2016 she was transfusion dependent with platelets under 10 k and ANC dropped under 500. 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  She has met with Dr Mcdaniel at [facility] and consulted with Dr Rogel at [facility].      After further consultation it was decided to admit on 1/9/17 for ATG/cyclosporine/prednisone therapy.     The following was her inpatient regimen:  Day 1= 1/9/17  ATG 2500 mg (40 mg/kg0 q24 hours D-4)  Cyclosporine 200 mg (3 mg/kg) PO bid  Eltrombopag 50 mg daily  Methylpred 31 mg (0.5 mg/kg) q24 hours D1-4  Prednisone 60 mg (1 mg/kg) daily after completion of IV methylpred to continue for at least 2 weeks    Bonny also had a admission 1/23-1/25/17 for rectal bleeding (presumed hemorrhoidal).  See DC summary for details.     INTERVAL HISTORY: Bonny comes in for weekly follow up.  She feels she is stable. She continues to have a dry mouth.  She is trying to drink closer to 1 L daily of fluids. She is eating well and friends/family have been bringing her meals.  Her LBP continues to wax/wane, she feels she sits around too much and wishes she could move more, but the longer she is up and around the worse the pain is.  Takes Tylenol 2-3 times a day and oxycodone 0-2 times.  Heat actually helps the most.   Her energy levels are stable.  She has OCASIO in the last couple weeks.  Not dizzy.  No bleeding, anywhere.     No fevers, chest pain.  Daily BM, no black/blood. No  issues.     EXAM:  /66 (BP Location: Right arm, Patient Position: Chair, Cuff Size:  Adult Regular)  Pulse 96  Temp 97.4  F (36.3  C) (Oral)  Resp 18  Wt 60 kg (132 lb 3.2 oz)  SpO2 92%  BMI 23.42 kg/m2  Wt Readings from Last 4 Encounters:   02/17/17 60 kg (132 lb 3.2 oz)   02/02/17 60.3 kg (133 lb)   01/26/17 60.3 kg (132 lb 15 oz)   01/25/17 60.7 kg (133 lb 14.4 oz)     Vital signs were reviewed.   Patient alert and oriented x3.    PERRLA. EOMI. No scleral icterus noted. OP shows thick white coat on tongue  Neck exam: No palpable cervical, supraclavicular or axillary nodes bilaterally.   Heart: RRR no murmurs noted.   Lungs: clear to auscultation bilaterally.  No crackles or wheezing.   Abd: positive bowel sounds in all four quadrants.  No tenderness to palpation.  No hepatomegaly.   Extremities: Edema mild in left leg, stable  Neuro: grossly intact.   Mood and affect is stable.       LABS:      2/14/2017 09:40 2/17/2017 10:31   WBC 2.8 (L) 3.0 (L)   Hemoglobin 8.6 (L) 8.0 (L)   Hematocrit 24.0 (L) 22.9 (L)   Platelet Count 14 (LL) 9 (LL)   RBC Count 2.84 (L) 2.62 (L)   MCV 85 87   Absolute Neutrophil 0.8 (L) 0.8 (L)      2/17/2017 11:15 2/17/2017 12:35 2/17/2017 13:40   Sodium 139 140    Potassium 6.5 (HH) 6.4 (HH) 4.1   Chloride 105 105    Carbon Dioxide 25 24    Urea Nitrogen 49 (H) 47 (H)    Creatinine 1.36 (H) 1.29 (H)    GFR Estimate 38 (L) 40 (L)    GFR Estimate If Black 46 (L) 49 (L)    Calcium 8.2 (L) 8.3 (L)    Anion Gap 9 11    Albumin 2.8 (L) 2.8 (L)    Protein Total 7.0 6.8    Bilirubin Total 2.4 (H) 2.3 (H)    Alkaline Phosphatase 80 77    ALT <60 (H) 23    AST 26 23      ASSESSMENT/PLAN: 71 year old female with AA, currently pancytopenic 2/2 to disease and further worsening from recent therapy    HEME- Treatment for AA 1/9-1/13/17 with ATG, methylpred, cyclosporine and eltrombopag.   -cyclosporine 125 mg BID decreased (2/14) due to ELSA/hyperbili.  Improved today, thus keep at same dose. Continue weekly checks, keep level between 200-350. Next check on 2/21 at [facility].   -continue 40 mg  daily of prednisone  2/24 will decrease by 10 mg and continue EVERY OTHER WEEK  -eltrombopag 150 mg daily  -Will need transfusion support 3 x week, worked on schedule today mostly at  [facility] (Sunday, Tuesday each week, Lucy Friday)  -transfuse if hgb <8 and platelets <30k (will get 1 unit of PRBC and platelets today)      CV-two prior provoked DVT in 2012 and 2014 with travel. 2012 was associated with PE. Was on warfarin/lovenox in the past.  Transitioned to ASA which has been held in the setting of low platelets  -Norvasc stopped 1/25, BP stable    ID- ANC improved today at 800. No fever or symptoms of infection.  -continue flucon 200 mg daily + levaquin 500 mg daily  -CMV was neg  -inh pentamidine 1/24, next 2/24    MSK- low back pain for last month.  Low thoracic mid spine pain.  Last CXR showed compression fractures in her vertebral bodies.  Need to get some further imaging at some point.  For now continue prn tylenol and oxycodone    FEN: weight stable.  Fluid intake sub optimal, suggested over a liter of fluid intake daily.  ELSA today- likely 2/2 to CSA (see above)    GI: mild constipation, continue alternating senna + miralax.   -bili elevation, hemolysis was r/o twice now.  Likely 2/2 to CSA, check again 2/21 (CMP + CSA level)  -continue ppx protonix        Sejal Walls PA-C

## 2017-02-17 NOTE — PATIENT INSTRUCTIONS
Bonny-    1. Continue with 3 promacta daily  2. Continue with 40 mg prednisone until I see you next  3. Continue with 125 mg twice a day of the cyclosporine

## 2017-02-17 NOTE — PROGRESS NOTES
Infusion Nursing Note:  Bonny Raymundo presents today for 1 unit PRBC, 1 dose Platelets    Patient seen by provider today: Yes: Sejal Walls PA-C    Intravenous Access:  PICC. Dressing due to be changed 2/19/17.    Treatment Conditions:  Lab Results   Component Value Date    HGB 8.0 02/17/2017     Lab Results   Component Value Date    WBC 3.0 02/17/2017      Lab Results   Component Value Date    ANEU 0.8 02/17/2017     Lab Results   Component Value Date    PLT 9 02/17/2017      Results reviewed, labs MET treatment parameters, ok to proceed with treatment.  Blood transfusion consent signed 10/6/16.    Note: BP remained stable throughout transfusions. Blood transfusion rate: 120ml/hr.    Post Infusion Assessment:  Patient tolerated infusion without incident.  Blood return noted pre and post infusion.  Site patent and intact, free from redness, edema or discomfort.  No evidence of extravasations.  Access discontinued per protocol.    Discharge Plan:   Prescription refills given for oxycodone, nystatin, and cyclosporine.  Discharge instructions reviewed with: Patient.  Patient and/or family verbalized understanding of discharge instructions and all questions answered.  Copy of AVS reviewed with patient and/or family.  Patient will return 2/19/17 to [facility] and  for next appointment.  Patient discharged in stable condition accompanied by: self.  Departure Mode: Wheelchair.    Graciela Angelo RN

## 2017-02-17 NOTE — MR AVS SNAPSHOT
After Visit Summary   2/17/2017    Bonny Raymundo    MRN: [id number]           Patient Information     Date Of Birth          1945        Visit Information        Provider Department      2/17/2017 10:20 AM Celine Walls PA-C [facility]        Today's Diagnoses     Oral thrush    -  1    Idiopathic aplastic anemia (H)        Left-sided low back pain without sciatica, unspecified chronicity          Care Instructions    Bonny-    1. Continue with 3 promacta daily  2. Continue with 40 mg prednisone until I see you next  3. Continue with 125 mg twice a day of the cyclosporine          Follow-ups after your visit        Your next 10 appointments already scheduled     Feb 21, 2017  9:00 AM CST   Level 5 with SOUTH CHAIR 8   [facility] ([facility])    [facility]  [address]  [place] [address]   [phone]            Feb 24, 2017  9:30 AM CST   [facility] Lab Draw with  MASONIC LAB DRAW   [facility] ([facility])    [address] [place] [id number] [phone]            Feb 24, 2017 10:20 AM CST   (Arrive by 10:05 AM)   Return Visit with CLARIBEL [facility] ([facility])    [address] [place] [address]   [phone]            Feb 24, 2017 11:00 AM CST   Infusion 360 with  ONCOLOGY INFUSION, UC 13 ATC   [facility] ([facility])    [address] [place] [address]   [phone]            Feb 26, 2017  8:30 AM CST   Level 5 with SOUTH CHAIR 5   [facility] ([facility])    [facility]  [address]  [place] [address]   [phone]            Feb 28, 2017  8:30 AM CST   Level 5 with  "[facility] CHAIR 1   [facility] ([facility])    [facility] [place]  [address]  [place] [address]   [phone]            Mar 03, 2017  9:00 AM CST   [facility] Lab Draw with UC MASONIC LAB DRAW   [facility] Lab Draw ([facility])    [address]  [address] [facility] [id number] [phone]            Mar 03, 2017  9:30 AM CST   (Arrive by 9:15 AM)   Return Visit with Celine Walls PA-C   [facility] ([facility])    [address]  [address] [facility] [address] [phone]            Mar 03, 2017 10:30 AM CST   Infusion 360 with UC ONCOLOGY INFUSION   [facility] ([facility])    [address] [address] [phone]              Who to contact     If you have questions or need follow up information about today's clinic visit or your schedule please contact [facility] directly at [phone].  Normal or non-critical lab and imaging results will be communicated to you by MyChart, letter or phone within 4 business days after the clinic has received the results. If you do not hear from us within 7 days, please contact the clinic through MyChart or phone. If you have a critical or abnormal lab result, we will notify you by phone as soon as possible.  Submit refill requests through Expert or call your pharmacy and they will forward the refill request to us. Please allow 3 business days for your refill to be completed.          Additional Information About Your Visit        [employer] Information     Expert lets you send messages to your doctor, view your test results, renew your prescriptions, schedule appointments and more. To sign up, go to [email or web address] . Click on \"Log in\" on the left side of the screen, which will take you to " "the Welcome page. Then click on \"Sign up Now\" on the right side of the page.     You will be asked to enter the access code listed below, as well as some personal information. Please follow the directions to create your username and password.     Your access code is: [email or web address]  Expires: 3/15/2017 11:47 AM     Your access code will  in 90 days. If you need help or a new code, please call your [facility] clinic or [phone].        Care EveryWhere ID     This is your Care EveryWhere ID. This could be used by other organizations to access your [facility] medical records  [id number]        Your Vitals Were     Pulse Temperature Respirations Pulse Oximetry BMI (Body Mass Index)       96 97.4  F (36.3  C) (Oral) 18 92% 23.42 kg/m2        Blood Pressure from Last 3 Encounters:   17 130/84   17 (!) 152/93   17 102/66    Weight from Last 3 Encounters:   17 60 kg (132 lb 3.2 oz)   17 60.3 kg (133 lb)   17 60.3 kg (132 lb 15 oz)              Today, you had the following     No orders found for display         Today's Medication Changes          These changes are accurate as of: 17 11:59 PM.  If you have any questions, ask your nurse or doctor.               Start taking these medicines.        Dose/Directions    nystatin 880688 UNIT/ML suspension   Commonly known as:  MYCOSTATIN   Used for:  Idiopathic aplastic anemia (H), Oral thrush   Started by:  Celine Walls PA-C        Dose:  [id number] Units   Take 5 mLs (500,000 Units) by mouth 4 times daily Swish and spit.   Quantity:  280 mL   Refills:  1            Where to get your medicines      These medications were sent to [facility] [place], [place] - [address] [facility] [address]  [address] [place] [address] [address], [facility] [address]    Hours:  TRANSPLANT PHONE NUMBER [phone] Phone:  [phone]     cycloSPORINE modified 25 MG capsule    nystatin [id number] UNIT/ML suspension         Some of these will " need a paper prescription and others can be bought over the counter.  Ask your nurse if you have questions.     Bring a paper prescription for each of these medications     oxyCODONE 5 MG IR tablet                Primary Care Provider Office Phone # Fax #    Rafita Rogel [phone]        [facility] [address] [facility] [address]  [facility] [address]        Thank you!     Thank you for choosing [facility]  for your care. Our goal is always to provide you with excellent care. Hearing back from our patients is one way we can continue to improve our services. Please take a few minutes to complete the written survey that you may receive in the mail after your visit with us. Thank you!             Your Updated Medication List - Protect others around you: Learn how to safely use, store and throw away your medicines at www.disposemymeds.org.          This list is accurate as of: 2/17/17 11:59 PM.  Always use your most recent med list.                   Brand Name Dispense Instructions for use    BENADRYL ALLERGY 25 MG tablet   Generic drug:  diphenhydrAMINE     56 tablet    Take 1 tablet (25 mg) by mouth nightly as needed       chlorpheniramine 4 MG tablet    CHLOR-TRIMETON     Take 4 mg by mouth every 6 hours as needed. For head cold       COMPRESSION STOCKINGS     2 each    Wear compression stockings at 20-30 mmHg rating most time during the day to the affected leg (left leg) or both legs. Take them off at night.       * cycloSPORINE modified 100 MG capsule    GENERIC EQUIVALENT    120 capsule    Taking 125 mg BID. This reflects a dose change as of 2/15.       * cycloSPORINE modified 25 MG capsule    GENERIC EQUIVALENT    60 capsule    Take 1 capsule (25 mg) by mouth 2 times daily Along with one 100 mg capsules       diclofenac 1 % Gel topical gel    VOLTAREN    100 g    Apply 2 g topically 2 times daily       eltrombopag 50 MG tablet    PROMACTA    180 tablet    Take 3  tablets (150 mg) by mouth daily Administer on an empty stomach, 1 hour before or 2 hours after a meal. Or as directed       fluconazole 200 MG tablet    DIFLUCAN    30 tablet    Take 1 tablet (200 mg) by mouth daily       levofloxacin 500 MG tablet    LEVAQUIN    30 tablet    Take 1 tablet (500 mg) by mouth daily       loratadine 10 MG tablet    CLARITIN     Take 10 mg by mouth daily as needed. For head cold       nystatin [id number] UNIT/ML suspension    MYCOSTATIN    280 mL    Take 5 mLs (500,000 Units) by mouth 4 times daily Swish and spit.       order for DME     1 Box    Equipment being ordered: knee high compression stockings- 18-20 mm       oxyCODONE 5 MG IR tablet    ROXICODONE    50 tablet    Take 1 tablet (5 mg) by mouth every 6 hours as needed for moderate to severe pain       pantoprazole 40 MG EC tablet    PROTONIX    30 tablet    Take 1 tablet (40 mg) by mouth every morning       polyethylene glycol Packet    MIRALAX/GLYCOLAX    14 packet    Take 17 g by mouth daily . If you start to have loose stools/diarrhea or multiple bowel movements, ok to hold this medication. Then resume if no bowel movement after 24-48hours.       predniSONE 10 MG tablet    DELTASONE    180 tablet    Take 4 tablets (40 mg) by mouth daily Or as directed       SENNA S 8.6-50 MG per tablet   Generic drug:  senna-docusate     100 tablet    Can take 1-2 tablets up to twice a day. Can start with 1-2 tablets daily. If no bowel movement within 24 hours, can take 1-2 tablets twice a day. HOLD if you develop diarrhea. Resume taking if no bowel movement in 24 hours.       TYLENOL PO      Take 325 mg by mouth every 6 hours as needed for mild pain or fever       * Notice:  This list has 2 medication(s) that are the same as other medications prescribed for you. Read the directions carefully, and ask your doctor or other care provider to review them with you.

## 2017-02-17 NOTE — PATIENT INSTRUCTIONS
Bonny-     1. Continue with 3 promacta daily  2. Continue with 40 mg prednisone until I see you next  3. Continue with 125 mg twice a day of the cyclosporine    Contact Numbers  [facility] Nurse Triage: [phone]  After Hours Nurse Line:  [phone]  Hospital Main Line:  [phone]    Please call the [facility] Triage line if you experience a temperature greater than or equal to 100.5, shaking chills, have uncontrolled nausea, vomiting and/or diarrhea, dizziness, shortness of breath, chest pain, bleeding, unexplained bruising, or if you have any other new/concerning symptoms, questions or concerns.     If it is after hours, weekends, or holidays, please call either the after hours nurse line listed above or the main hospital  at  [phone] and ask to speak to the Oncology doctor on call.     If you are having any concerning symptoms or wish to speak to a provider before your next infusion visit, please call your care coordinator or triage to notify them so we can adequately serve you.     If you need a refill on a narcotic prescription or other medication, please call triage before your infusion appointment.         February 2017 Sunday Monday Tuesday Wednesday Thursday Friday Saturday                  1     2     [facility] LAB DRAW   11:30 AM   (15 min.)    [facility] LAB DRAW   [facility] Lab Draw     [facility] ONC INFUSION 360   12:00 PM   (360 min.)    ONCOLOGY INFUSION   [facility] ONC RETURN    3:30 PM   (30 min.)   Rafita Rogel MD   [facility] Blood and Marrow Transplant 3     4       5     Outpatient Visit    8:36 AM   Manpreet Mcdaniel MD [facility] Lab     LEVEL 5    9:00 AM   (300 min.)   SOUTH CHAIR 4   [facility] 6     7     Outpatient Visit    8:15 AM   Manpreet Mcdaniel MD [facility]     LEVEL 5    9:00 AM   (300 min.)   SOUTH CHAIR 2   [facility] 8      9     10     [facility] DRAW    9:00 AM   (15 min.)   [facility]   [facility] Lab Draw     [facility] RETURN    9:15 AM   (50 min.)   C[facility] Walls [facility]-C [facility]   10:30 AM   (360 min.)   [facility] ONCOLOGY INFUSION   [facility] 11       12     LEVEL 5    8:30 AM   (300 min.)   SOUTH CHAIR 5   [facility]     Outpatient Visit    8:54 AM   Manpreet Mcdaniel MD   [facility] Lab 13     14     Outpatient Visit    8:28 AM   [facility]     LEVEL 5    9:00 AM   (300 min.)   NORTH CHAIR 11   [facility] 15     16     17     [facility] DRAW    9:30 AM   (15 min.)   [facility] DRAW   [facility] Lab Draw     [facility] RETURN   10:05 AM   (50 min.)   Celine Walls PA-C [facility] ONC INFUSION 360   10:30 AM   (360 min.)    ONCOLOGY INFUSION   [facility] 18       19     LEVEL 5    8:30 AM   (300 min.)   SOUTH CHAIR 3   [facility] 20     21     LEVEL 5    9:00 AM   (300 min.)   SOUTH CHAIR 8   [facility] 22     23     24     [facility] DRAW    9:30 AM   (15 min.)   [facility] DRAW   [facility] Lab Draw     [facility] RETURN   10:05 AM   (50 min.)   Celine Walls [facility]-C [facility] 360   11:00 AM   (360 min.)    ONCOLOGY INFUSION   [facility] 25       26     27     28 March 2017 Sunday Monday Tuesday Wednesday Thursday Friday Saturday                  1     2     3     [facility] DRAW    9:00 AM   (15 min.)   [facility] DRAW   [facility] Draw     [facility] RETURN    9:15 AM   (50 min.)   Celine Walls PA-C [facility] 360   10:30 AM   (360 min.)   [facility] ONCOLOGY INFUSION   [facility]  [facility] 4       5     6     7     8     9     [facility] ONC RETURN    3:00 PM   (30 min.)   Rafita Rogel MD   [facility] Blood and Marrow Transplant 10     11       12     13     14     15     16     17     18       19     20     21     22     23     24     25       26     27     28     29     30     31                       Lab Results:  Recent Results (from the past 12 hour(s))   CBC with platelets differential    Collection Time: 02/17/17 10:31 AM   Result Value Ref Range    WBC 3.0 (L) 4.0 - 11.0 10e9/L    RBC Count 2.62 (L) 3.8 - 5.2 10e12/L    Hemoglobin 8.0 (L) 11.7 - 15.7 g/dL    Hematocrit 22.9 (L) 35.0 - 47.0 %    MCV 87 78 - 100 fl    MCH 30.5 26.5 - 33.0 pg    MCHC 34.9 31.5 - 36.5 g/dL    RDW 17.4 (H) 10.0 - 15.0 %    Platelet Count 9 (LL) 150 - 450 10e9/L    Diff Method Automated Method     % Neutrophils 27.8 %    % Lymphocytes 64.2 %    % Monocytes 7.0 %    % Eosinophils 0.0 %    % Basophils 0.0 %    % Immature Granulocytes 1.0 %    Nucleated RBCs 5 (H) 0 /100    Absolute Neutrophil 0.8 (L) 1.6 - 8.3 10e9/L    Absolute Lymphocytes 1.9 0.8 - 5.3 10e9/L    Absolute Monocytes 0.2 0.0 - 1.3 10e9/L    Absolute Eosinophils 0.0 0.0 - 0.7 10e9/L    Absolute Basophils 0.0 0.0 - 0.2 10e9/L    Abs Immature Granulocytes 0.0 0 - 0.4 10e9/L    Absolute Nucleated RBC 0.1    ABO/Rh type and screen    Collection Time: 02/17/17 10:31 AM   Result Value Ref Range    Units Ordered 1     ABO A     RH(D)  Pos     Antibody Screen Neg     Test Valid Only At       [facility]    Specimen Expires 02/20/2017     Crossmatch Red Blood Cells    Blood component    Collection Time: 02/17/17 10:31 AM   Result Value Ref Range    Unit Number [id number]     Blood Component Type Red Blood Cells LeukoReduced Irradiated     Division Number 00     Status of Unit Released to care unit 02/17/2017 1342     Blood Product Code P4429P79     Unit Status ISS    Platelets  prepare order unit    Collection Time: 02/17/17 10:59 AM   Result Value Ref Range    Ordered Component Type PLT Pheresis     Units Ordered 1    Blood component    Collection Time: 02/17/17 10:59 AM   Result Value Ref Range    Unit Number [id number]     Blood Component Type PlateletPheresis,LeukoRed Irrad (Part 2)     Division Number 00     Status of Unit Released to care unit 02/17/2017 1200     Blood Product Code O9262H90     Unit Status ISS    Comprehensive metabolic panel    Collection Time: 02/17/17 11:15 AM   Result Value Ref Range    Sodium 139 133 - 144 mmol/L    Potassium 6.5 (HH) 3.4 - 5.3 mmol/L    Chloride 105 94 - 109 mmol/L    Carbon Dioxide 25 20 - 32 mmol/L    Anion Gap 9 3 - 14 mmol/L    Glucose 133 (H) 70 - 99 mg/dL    Urea Nitrogen 49 (H) 7 - 30 mg/dL    Creatinine 1.36 (H) 0.52 - 1.04 mg/dL    GFR Estimate 38 (L) >60 mL/min/1.7m2    GFR Estimate If Black 46 (L) >60 mL/min/1.7m2    Calcium 8.2 (L) 8.5 - 10.1 mg/dL    Bilirubin Total 2.4 (H) 0.2 - 1.3 mg/dL    Albumin 2.8 (L) 3.4 - 5.0 g/dL    Protein Total 7.0 6.8 - 8.8 g/dL    Alkaline Phosphatase 80 40 - 150 U/L    ALT <60 (H) 0 - 50 U/L    AST 26 0 - 45 U/L   Comprehensive metabolic panel    Collection Time: 02/17/17 12:35 PM   Result Value Ref Range    Sodium 140 133 - 144 mmol/L    Potassium 6.4 (HH) 3.4 - 5.3 mmol/L    Chloride 105 94 - 109 mmol/L    Carbon Dioxide 24 20 - 32 mmol/L    Anion Gap 11 3 - 14 mmol/L    Glucose 155 (H) 70 - 99 mg/dL    Urea Nitrogen 47 (H) 7 - 30 mg/dL    Creatinine 1.29 (H) 0.52 - 1.04 mg/dL    GFR Estimate 40 (L) >60 mL/min/1.7m2    GFR Estimate If Black 49 (L) >60 mL/min/1.7m2    Calcium 8.3 (L) 8.5 - 10.1 mg/dL    Bilirubin Total 2.3 (H) 0.2 - 1.3 mg/dL    Albumin 2.8 (L) 3.4 - 5.0 g/dL    Protein Total 6.8 6.8 - 8.8 g/dL    Alkaline Phosphatase 77 40 - 150 U/L    ALT 23 0 - 50 U/L    AST 23 0 - 45 U/L   Potassium    Collection Time: 02/17/17  1:40 PM   Result Value Ref Range    Potassium 4.1 3.4 - 5.3  mmol/L

## 2017-02-17 NOTE — NURSING NOTE
Chief Complaint   Patient presents with     Blood Draw     Labs drawn by RN from PICC. VS taken.      Both lumen flushed with NS. Labs drawn from purple lumen.   Guerline Lopez RN

## 2017-02-17 NOTE — LETTER
2/17/2017      RE: Bonny Raymundo  [address]  [facility] [facility] [address]       [facility]  FOLLOW-UP VISIT NOTE  Date of visit: 2-17-17          REASON FOR VISIT: Aplastic anemia, weekly follow up    HPI: Bonny is a 71 year old female who presented in the fall of 2016 with fatigue and shortness of breath. 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  Her counts showed pancytopenia and BMBX was concerning for aplastic anemia.  By December of 2016 she was transfusion dependent with platelets under 10 k and ANC dropped under 500. 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  She has met with Dr Mcdaniel at [facility] and consulted with Dr Rogel at [facility].      After further consultation it was decided to admit on 1/9/17 for ATG/cyclosporine/prednisone therapy.     The following was her inpatient regimen:  Day 1= 1/9/17  ATG 2500 mg (40 mg/kg0 q24 hours D-4)  Cyclosporine 200 mg (3 mg/kg) PO bid  Eltrombopag 50 mg daily  Methylpred 31 mg (0.5 mg/kg) q24 hours D1-4  Prednisone 60 mg (1 mg/kg) daily after completion of IV methylpred to continue for at least 2 weeks    Bonny also had a admission 1/23-1/25/17 for rectal bleeding (presumed hemorrhoidal).  See DC summary for details.     INTERVAL HISTORY: Bonny comes in for weekly follow up.  She feels she is stable. She continues to have a dry mouth.  She is trying to drink closer to 1 L daily of fluids. She is eating well and friends/family have been bringing her meals.  Her LBP continues to wax/wane, she feels she sits around too much and wishes she could move more, but the longer she is up and around the worse the pain is.  Takes Tylenol 2-3 times a day and oxycodone 0-2 times.  Heat actually helps the most.   Her energy levels are stable.  She has OCASIO in the last couple weeks.  Not dizzy.  No bleeding, anywhere.     No fevers, chest pain.  Daily BM, no black/blood. No   issues.     EXAM:  /66 (BP Location: Right arm, Patient Position: Chair, Cuff Size: Adult Regular)  Pulse 96  Temp 97.4  F (36.3  C) (Oral)  Resp 18  Wt 60 kg (132 lb 3.2 oz)  SpO2 92%  BMI 23.42 kg/m2  Wt Readings from Last 4 Encounters:   02/17/17 60 kg (132 lb 3.2 oz)   02/02/17 60.3 kg (133 lb)   01/26/17 60.3 kg (132 lb 15 oz)   01/25/17 60.7 kg (133 lb 14.4 oz)     Vital signs were reviewed.   Patient alert and oriented x3.    PERRLA. EOMI. No scleral icterus noted. OP shows thick white coat on tongue  Neck exam: No palpable cervical, supraclavicular or axillary nodes bilaterally.   Heart: RRR no murmurs noted.   Lungs: clear to auscultation bilaterally.  No crackles or wheezing.   Abd: positive bowel sounds in all four quadrants.  No tenderness to palpation.  No hepatomegaly.   Extremities: Edema mild in left leg, stable  Neuro: grossly intact.   Mood and affect is stable.       LABS:      2/14/2017 09:40 2/17/2017 10:31   WBC 2.8 (L) 3.0 (L)   Hemoglobin 8.6 (L) 8.0 (L)   Hematocrit 24.0 (L) 22.9 (L)   Platelet Count 14 (LL) 9 (LL)   RBC Count 2.84 (L) 2.62 (L)   MCV 85 87   Absolute Neutrophil 0.8 (L) 0.8 (L)      2/17/2017 11:15 2/17/2017 12:35 2/17/2017 13:40   Sodium 139 140    Potassium 6.5 (HH) 6.4 (HH) 4.1   Chloride 105 105    Carbon Dioxide 25 24    Urea Nitrogen 49 (H) 47 (H)    Creatinine 1.36 (H) 1.29 (H)    GFR Estimate 38 (L) 40 (L)    GFR Estimate If Black 46 (L) 49 (L)    Calcium 8.2 (L) 8.3 (L)    Anion Gap 9 11    Albumin 2.8 (L) 2.8 (L)    Protein Total 7.0 6.8    Bilirubin Total 2.4 (H) 2.3 (H)    Alkaline Phosphatase 80 77    ALT <60 (H) 23    AST 26 23      ASSESSMENT/PLAN: 71 year old female with AA, currently pancytopenic 2/2 to disease and further worsening from recent therapy    HEME- Treatment for AA 1/9-1/13/17 with ATG, methylpred, cyclosporine and eltrombopag.   -cyclosporine 125 mg BID decreased (2/14) due to ELSA/hyperbili.  Improved today, thus keep at same dose.  Continue weekly checks, keep level between 200-350. Next check on 2/21 at [facility].   -continue 40 mg daily of prednisone  2/24 will decrease by 10 mg and continue EVERY OTHER WEEK  -eltrombopag 150 mg daily  -Will need transfusion support 3 x week, worked on schedule today mostly at  [facility] (Sunday, Tuesday each week, Lucy Friday)  -transfuse if hgb <8 and platelets <30k (will get 1 unit of PRBC and platelets today)      CV-two prior provoked DVT in 2012 and 2014 with travel. 2012 was associated with PE. Was on warfarin/lovenox in the past.  Transitioned to ASA which has been held in the setting of low platelets  -Norvasc stopped 1/25, BP stable    ID- ANC improved today at 800. No fever or symptoms of infection.  -continue flucon 200 mg daily + levaquin 500 mg daily  -CMV was neg  -inh pentamidine 1/24, next 2/24    MSK- low back pain for last month.  Low thoracic mid spine pain.  Last CXR showed compression fractures in her vertebral bodies.  Need to get some further imaging at some point.  For now continue prn tylenol and oxycodone    FEN: weight stable.  Fluid intake sub optimal, suggested over a liter of fluid intake daily.  ELSA today- likely 2/2 to CSA (see above)    GI: mild constipation, continue alternating senna + miralax.   -bili elevation, hemolysis was r/o twice now.  Likely 2/2 to CSA, check again 2/21 (CMP + CSA level)  -continue ppx protonix        Sejal Walls PA-C

## 2017-02-17 NOTE — MR AVS SNAPSHOT
After Visit Summary   2/17/2017    Bonny Raymundo    MRN: [id number]           Patient Information     Date Of Birth          1945        Visit Information        Provider Department      2/17/2017 10:30 AM  21 ATC;  ONCOLOGY INFUSION [facility]        Today's Diagnoses     Pancytopenia (H)    -  1      Care Instructions    Bonny-     1. Continue with 3 promacta daily  2. Continue with 40 mg prednisone until I see you next  3. Continue with 125 mg twice a day of the cyclosporine    Contact Numbers  [facility] Nurse Triage: [phone]  After Hours Nurse Line:  [phone]  Hospital Main Line:  [phone]    Please call the [facility] Triage line if you experience a temperature greater than or equal to 100.5, shaking chills, have uncontrolled nausea, vomiting and/or diarrhea, dizziness, shortness of breath, chest pain, bleeding, unexplained bruising, or if you have any other new/concerning symptoms, questions or concerns.     If it is after hours, weekends, or holidays, please call either the after hours nurse line listed above or the main hospital  at  [phone] and ask to speak to the Oncology doctor on call.     If you are having any concerning symptoms or wish to speak to a provider before your next infusion visit, please call your care coordinator or triage to notify them so we can adequately serve you.     If you need a refill on a narcotic prescription or other medication, please call triage before your infusion appointment.         February 2017 Sunday Monday Tuesday Wednesday Thursday Friday Saturday                  1     2     [facility] DRAW   11:30 AM   (15 min.)   [facility]   [facility] Lab Draw     [facility] ONC INFUSION 360   12:00 PM   (360 min.)    ONCOLOGY INFUSION   [facility] ONC RETURN    3:30 PM   (30 min.)   Rafita Rogel MD   [facility] Blood and Marrow Transplant 3      4       5     Outpatient Visit    8:36 AM   Manpreet Mcdaniel MD [facility]     LEVEL 5    9:00 AM   (300 min.)   SOUTH CHAIR 4   [facility] 6     7     Outpatient Visit    8:15 AM   Manpreet Mcdaniel MD [facility]     LEVEL 5    9:00 AM   (300 min.)   SOUTH CHAIR 2   [facility] 8     9     10     [facility]    9:00 AM   (15 min.)   [facility]   [facility] Draw     [facility] RETURN    9:15 AM   (50 min.)   Celine Walls PA-C M [facility]     [facility] ONC INFUSION 360   10:30 AM   (360 min.)   [facility] ONCOLOGY INFUSION   [facility] 11       12     LEVEL 5    8:30 AM   (300 min.)   SOUTH CHAIR 5   [facility]     Outpatient Visit    8:54 AM   Manpreet Mcdaniel MD [facility] 13     14     Outpatient Visit    8:28 AM   [facility]     LEVEL 5    9:00 AM   (300 min.)   [place] CHAIR 11   [facility] 15     16     17     [facility] D[facility]    9:30 AM   (15 min.)   [facility]   [facility] Draw     [facility] RETURN   10:05 AM   (50 min.)   Celine Walls PA-C M [facility]     [facility] ONC INFUSION 360   10:30 AM   (360 min.)   [facility] ONCOLOGY INFUSION   [facility] 18       19     LEVEL 5    8:30 AM   (300 min.)   SOUTH CHAIR 3   [facility] 20     21     LEVEL 5    9:00 AM   (300 min.)   SOUTH CHAIR 8   [facility] 22     23     24     [facility] DRAW    9:30 AM   (15 min.)   [facility] DRAW   [facility] Draw     [facility] RETURN   10:05 AM   (50 min.)   [facility]   11:00 AM   (360 min.)   [facility] ONCOLOGY INFUSION   [facility] 25       26     27     28                                     March 2017 Sunday Monday Tuesday Wednesday Thursday Friday Saturday                  1     2     3     [facility] DRAW    9:00 AM   (15 min.)    [facility] LAB DRAW   [facility] Lab Draw     [facility] RETURN    9:15 AM   (50 min.)   Celine Walls PA-C   [facility]     [facility] ONC INFUSION 360   10:30 AM   (360 min.)   [facility] ONCOLOGY INFUSION   [facility] 4       5     6     7     8     9     [facility] ONC RETURN    3:00 PM   (30 min.)   Rafita Rogel MD   [facility] Blood and Marrow Transplant 10     11       12     13     14     15     16     17     18       19     20     21     22     23     24     25       26     27     28     29     30     31                       Lab Results:  Recent Results (from the past 12 hour(s))   CBC with platelets differential    Collection Time: 02/17/17 10:31 AM   Result Value Ref Range    WBC 3.0 (L) 4.0 - 11.0 10e9/L    RBC Count 2.62 (L) 3.8 - 5.2 10e12/L    Hemoglobin 8.0 (L) 11.7 - 15.7 g/dL    Hematocrit 22.9 (L) 35.0 - 47.0 %    MCV 87 78 - 100 fl    MCH 30.5 26.5 - 33.0 pg    MCHC 34.9 31.5 - 36.5 g/dL    RDW 17.4 (H) 10.0 - 15.0 %    Platelet Count 9 (LL) 150 - 450 10e9/L    Diff Method Automated Method     % Neutrophils 27.8 %    % Lymphocytes 64.2 %    % Monocytes 7.0 %    % Eosinophils 0.0 %    % Basophils 0.0 %    % Immature Granulocytes 1.0 %    Nucleated RBCs 5 (H) 0 /100    Absolute Neutrophil 0.8 (L) 1.6 - 8.3 10e9/L    Absolute Lymphocytes 1.9 0.8 - 5.3 10e9/L    Absolute Monocytes 0.2 0.0 - 1.3 10e9/L    Absolute Eosinophils 0.0 0.0 - 0.7 10e9/L    Absolute Basophils 0.0 0.0 - 0.2 10e9/L    Abs Immature Granulocytes 0.0 0 - 0.4 10e9/L    Absolute Nucleated RBC 0.1    ABO/Rh type and screen    Collection Time: 02/17/17 10:31 AM   Result Value Ref Range    Units Ordered 1     ABO A     RH(D)  Pos     Antibody Screen Neg     Test Valid Only At       [facility]     Specimen Expires 02/20/2017     Crossmatch Red Blood Cells    Blood component    Collection Time: 02/17/17 10:31 AM   Result Value Ref Range    Unit Number [id number]     Blood Component Type Red Blood Cells LeukoReduced Irradiated     Division Number 00     Status of Unit Released to care unit 02/17/2017 1342     Blood Product Code [id number]     Unit Status ISS    Platelets prepare order unit    Collection Time: 02/17/17 10:59 AM   Result Value Ref Range    Ordered Component Type PLT Pheresis     Units Ordered 1    Blood component    Collection Time: 02/17/17 10:59 AM   Result Value Ref Range    Unit Number [id number]     Blood Component Type PlateletPheresis,LeukoRed Irrad (Part 2)     Division Number 00     Status of Unit Released to care unit 02/17/2017 1200     Blood Product Code [id number]     Unit Status ISS    Comprehensive metabolic panel    Collection Time: 02/17/17 11:15 AM   Result Value Ref Range    Sodium 139 133 - 144 mmol/L    Potassium 6.5 (HH) 3.4 - 5.3 mmol/L    Chloride 105 94 - 109 mmol/L    Carbon Dioxide 25 20 - 32 mmol/L    Anion Gap 9 3 - 14 mmol/L    Glucose 133 (H) 70 - 99 mg/dL    Urea Nitrogen 49 (H) 7 - 30 mg/dL    Creatinine 1.36 (H) 0.52 - 1.04 mg/dL    GFR Estimate 38 (L) >60 mL/min/1.7m2    GFR Estimate If Black 46 (L) >60 mL/min/1.7m2    Calcium 8.2 (L) 8.5 - 10.1 mg/dL    Bilirubin Total 2.4 (H) 0.2 - 1.3 mg/dL    Albumin 2.8 (L) 3.4 - 5.0 g/dL    Protein Total 7.0 6.8 - 8.8 g/dL    Alkaline Phosphatase 80 40 - 150 U/L    ALT <60 (H) 0 - 50 U/L    AST 26 0 - 45 U/L   Comprehensive metabolic panel    Collection Time: 02/17/17 12:35 PM   Result Value Ref Range    Sodium 140 133 - 144 mmol/L    Potassium 6.4 (HH) 3.4 - 5.3 mmol/L    Chloride 105 94 - 109 mmol/L    Carbon Dioxide 24 20 - 32 mmol/L    Anion Gap 11 3 - 14 mmol/L    Glucose 155 (H) 70 - 99 mg/dL    Urea Nitrogen 47 (H) 7 - 30 mg/dL    Creatinine 1.29 (H) 0.52 - 1.04 mg/dL    GFR Estimate 40 (L) >60 mL/min/1.7m2    GFR  Estimate If Black 49 (L) >60 mL/min/1.7m2    Calcium 8.3 (L) 8.5 - 10.1 mg/dL    Bilirubin Total 2.3 (H) 0.2 - 1.3 mg/dL    Albumin 2.8 (L) 3.4 - 5.0 g/dL    Protein Total 6.8 6.8 - 8.8 g/dL    Alkaline Phosphatase 77 40 - 150 U/L    ALT 23 0 - 50 U/L    AST 23 0 - 45 U/L   Potassium    Collection Time: 02/17/17  1:40 PM   Result Value Ref Range    Potassium 4.1 3.4 - 5.3 mmol/L                Follow-ups after your visit        Your next 10 appointments already scheduled     Feb 19, 2017  8:30 AM CST   Level 5 with SOUTH CHAIR 3   [facility] ([facility])    [facility]  [address]  [facility] [address]   [phone]            Feb 21, 2017  9:00 AM CST   Level 5 with SOUTH CHAIR 8   [facility] ([facility])    [facility]  [address]  [facility] [address]   [phone]            Feb 24, 2017  9:30 AM CST   [facility] Draw with  [facility] DRAW   [facility]    [address] [place] [address]   [phone]            Feb 24, 2017 10:20 AM CST   (Arrive by 10:05 AM)   Return Visit with Celine Walls PA-C   [facility] ([facility])    [address] [place] [address]   [phone]            Feb 24, 2017 11:00 AM CST   Infusion 360 with UC ONCOLOGY INFUSION, UC 13 ATC   [facility] ([facility])    [address] [place] [address]   [phone]            Mar 03, 2017  9:00 AM CST   [facility] Lab Draw with UC [facility] LAB DRAW   [facility] Draw ([facility])    [address] [place] [address]   [phone]            Mar 03, 2017  9:30 AM CST   (Arrive by 9:15 AM)    Return Visit with Celine Walls PA-C   [facility] ([facility])    [address]  [address]  [facility] [address] [phone]            Mar 03, 2017 10:30 AM CST   Infusion 360 with UC ONCOLOGY INFUSION, UC 16 ATC   [facility] ([facility])    [address]  [address]  [facility] [address] [phone]              Future tests that were ordered for you today     Open Standing Orders        Priority Remaining Interval Expires Ordered    Transfuse red blood cell unit Routine 99/100 TRANSFUSE 1 UNIT  2/17/2017    Platelets prepare order unit Routine 99/100 CONDITIONAL (SPECIFY) BLOOD  2/17/2017    Transfuse platelets unit Routine 99/100 TRANSFUSE 1 DOSE  2/17/2017    CBC with platelets differential Routine 30/30 3 x weekly 12/25/2017 2/17/2017    Comprehensive metabolic panel Routine 30/30 3 x weekly 12/25/2017 2/17/2017    ABO/Rh type and screen Routine 30/30 3 x weekly 12/25/2017 2/17/2017    Platelets prepare order unit Routine 30/30 3 x weekly 12/25/2017 2/17/2017    Red blood cell prepare order unit Routine 99/100 CONDITIONAL (SPECIFY) BLOOD  2/16/2017            Who to contact     If you have questions or need follow up information about today's clinic visit or your schedule please contact [facility] directly at [phone].  Normal or non-critical lab and imaging results will be communicated to you by MyChart, letter or phone within 4 business days after the clinic has received the results. If you do not hear from us within 7 days, please contact the clinic through MyChart or phone. If you have a critical or abnormal lab result, we will notify you by phone as soon as possible.  Submit refill requests through [facility] or call your pharmacy and they will forward the refill request to us. Please allow 3 business days for your refill to be completed.          Additional  "Information About Your Visit        MyChart Information     [email or web address] lets you send messages to your doctor, view your test results, renew your prescriptions, schedule appointments and more. To sign up, go to [email or web address] . Click on \"Log in\" on the left side of the screen, which will take you to the Welcome page. Then click on \"Sign up Now\" on the right side of the page.     You will be asked to enter the access code listed below, as well as some personal information. Please follow the directions to create your username and password.     Your access code is: [email or web address]  Expires: 3/15/2017 11:47 AM     Your access code will  in 90 days. If you need help or a new code, please call your [facility] clinic or [phone].        Care EveryWhere ID     This is your Care EveryWhere ID. This could be used by other organizations to access your [facility] medical records  [id number]        Your Vitals Were     Pulse Temperature Respirations Pulse Oximetry          89 98.4  F (36.9  C) (Oral) 16 92%         Blood Pressure from Last 3 Encounters:   17 133/84   17 102/66   17 113/73    Weight from Last 3 Encounters:   17 60 kg (132 lb 3.2 oz)   17 60.3 kg (133 lb)   17 60.3 kg (132 lb 15 oz)              We Performed the Following     ABO/Rh type and screen     Blood component     Blood component     CBC with platelets differential     Comprehensive metabolic panel     Comprehensive metabolic panel     Platelets prepare order unit     Potassium     Red blood cell prepare order unit     Transfuse platelets unit     Transfuse red blood cell unit          Today's Medication Changes          These changes are accurate as of: 17  3:33 PM.  If you have any questions, ask your nurse or doctor.               Start taking these medicines.        Dose/Directions    nystatin 706572 UNIT/ML suspension   Commonly known as:  MYCOSTATIN   Used for:  Idiopathic aplastic anemia (H), Oral " thrush   Started by:  Celine Walls PA-C        Dose:  266697 Units   Take 5 mLs (500,000 Units) by mouth 4 times daily Swish and spit.   Quantity:  280 mL   Refills:  1            Where to get your medicines      These medications were sent to [place], [place] - [address]  [address], [facility] [address]    Hours:  TRANSPLANT PHONE NUMBER [phone] Phone:  [phone]     cycloSPORINE modified 25 MG capsule    nystatin 179567 UNIT/ML suspension         Some of these will need a paper prescription and others can be bought over the counter.  Ask your nurse if you have questions.     Bring a paper prescription for each of these medications     oxyCODONE 5 MG IR tablet                Primary Care Provider Office Phone # Fax #    Rafita Rogel [phone]        [facility] [address]   [facility] [address]        Thank you!     Thank you for choosing [facility]  for your care. Our goal is always to provide you with excellent care. Hearing back from our patients is one way we can continue to improve our services. Please take a few minutes to complete the written survey that you may receive in the mail after your visit with us. Thank you!             Your Updated Medication List - Protect others around you: Learn how to safely use, store and throw away your medicines at www.disposemymeds.org.          This list is accurate as of: 2/17/17  3:33 PM.  Always use your most recent med list.                   Brand Name Dispense Instructions for use    BENADRYL ALLERGY 25 MG tablet   Generic drug:  diphenhydrAMINE     56 tablet    Take 1 tablet (25 mg) by mouth nightly as needed       chlorpheniramine 4 MG tablet    CHLOR-TRIMETON     Take 4 mg by mouth every 6 hours as needed. For head cold       COMPRESSION STOCKINGS     2 each    Wear compression stockings at 20-30 mmHg rating most  time during the day to the affected leg (left leg) or both legs. Take them off at night.       * cycloSPORINE modified 100 MG capsule    GENERIC EQUIVALENT    120 capsule    Taking 125 mg BID. This reflects a dose change as of 2/15.       * cycloSPORINE modified 25 MG capsule    GENERIC EQUIVALENT    60 capsule    Take 1 capsule (25 mg) by mouth 2 times daily Along with one 100 mg capsules       diclofenac 1 % Gel topical gel    VOLTAREN    100 g    Apply 2 g topically 2 times daily       eltrombopag 50 MG tablet    PROMACTA    180 tablet    Take 3 tablets (150 mg) by mouth daily Administer on an empty stomach, 1 hour before or 2 hours after a meal. Or as directed       fluconazole 200 MG tablet    DIFLUCAN    30 tablet    Take 1 tablet (200 mg) by mouth daily       levofloxacin 500 MG tablet    LEVAQUIN    30 tablet    Take 1 tablet (500 mg) by mouth daily       loratadine 10 MG tablet    CLARITIN     Take 10 mg by mouth daily as needed. For head cold       nystatin [id number] UNIT/ML suspension    MYCOSTATIN    280 mL    Take 5 mLs (500,000 Units) by mouth 4 times daily Swish and spit.       order for DME     1 Box    Equipment being ordered: knee high compression stockings- 18-20 mm       oxyCODONE 5 MG IR tablet    ROXICODONE    50 tablet    Take 1 tablet (5 mg) by mouth every 6 hours as needed for moderate to severe pain       pantoprazole 40 MG EC tablet    PROTONIX    30 tablet    Take 1 tablet (40 mg) by mouth every morning       polyethylene glycol Packet    MIRALAX/GLYCOLAX    14 packet    Take 17 g by mouth daily . If you start to have loose stools/diarrhea or multiple bowel movements, ok to hold this medication. Then resume if no bowel movement after 24-48hours.       predniSONE 10 MG tablet    DELTASONE    180 tablet    Take 4 tablets (40 mg) by mouth daily Or as directed       SENNA S 8.6-50 MG per tablet   Generic drug:  senna-docusate     100 tablet    Can take 1-2 tablets up to twice a day. Can start  with 1-2 tablets daily. If no bowel movement within 24 hours, can take 1-2 tablets twice a day. HOLD if you develop diarrhea. Resume taking if no bowel movement in 24 hours.       TYLENOL PO      Take 325 mg by mouth every 6 hours as needed for mild pain or fever       * Notice:  This list has 2 medication(s) that are the same as other medications prescribed for you. Read the directions carefully, and ask your doctor or other care provider to review them with you.

## 2017-02-19 ENCOUNTER — INFUSION THERAPY VISIT (OUTPATIENT)
Dept: INFUSION THERAPY | Facility: CLINIC | Age: 72
End: 2017-02-19
Attending: PHYSICIAN ASSISTANT
Payer: COMMERCIAL

## 2017-02-19 ENCOUNTER — HOSPITAL ENCOUNTER (OUTPATIENT)
Facility: CLINIC | Age: 72
Setting detail: SPECIMEN
Discharge: HOME | End: 2017-02-19
Attending: PHYSICIAN ASSISTANT | Admitting: PHYSICIAN ASSISTANT
Payer: COMMERCIAL

## 2017-02-19 VITALS
HEART RATE: 86 BPM | DIASTOLIC BLOOD PRESSURE: 84 MMHG | RESPIRATION RATE: 18 BRPM | TEMPERATURE: 98.1 F | SYSTOLIC BLOOD PRESSURE: 130 MMHG

## 2017-02-19 DIAGNOSIS — D61.3 IDIOPATHIC APLASTIC ANEMIA (H): ICD-10-CM

## 2017-02-19 LAB
ABO + RH BLD: NORMAL
ABO + RH BLD: NORMAL
ALBUMIN SERPL-MCNC: 2.9 G/DL (ref 3.4–5)
ALP SERPL-CCNC: 80 U/L (ref 40–150)
ALT SERPL W P-5'-P-CCNC: 39 U/L (ref 0–50)
ANION GAP SERPL CALCULATED.3IONS-SCNC: 10 MMOL/L (ref 3–14)
AST SERPL W P-5'-P-CCNC: 37 U/L (ref 0–45)
BASOPHILS # BLD AUTO: 0 10E9/L (ref 0–0.2)
BASOPHILS NFR BLD AUTO: 0 %
BILIRUB SERPL-MCNC: 2.7 MG/DL (ref 0.2–1.3)
BLD GP AB SCN SERPL QL: NORMAL
BLOOD BANK CMNT PATIENT-IMP: NORMAL
BUN SERPL-MCNC: 47 MG/DL (ref 7–30)
CALCIUM SERPL-MCNC: 8.4 MG/DL (ref 8.5–10.1)
CHLORIDE SERPL-SCNC: 101 MMOL/L (ref 94–109)
CO2 SERPL-SCNC: 23 MMOL/L (ref 20–32)
CREAT SERPL-MCNC: 1.26 MG/DL (ref 0.52–1.04)
DIFFERENTIAL METHOD BLD: ABNORMAL
EOSINOPHIL # BLD AUTO: 0 10E9/L (ref 0–0.7)
EOSINOPHIL NFR BLD AUTO: 0 %
ERYTHROCYTE [DISTWIDTH] IN BLOOD BY AUTOMATED COUNT: 17 % (ref 10–15)
GFR SERPL CREATININE-BSD FRML MDRD: 42 ML/MIN/1.7M2
GLUCOSE SERPL-MCNC: 101 MG/DL (ref 70–99)
HCT VFR BLD AUTO: 26.1 % (ref 35–47)
HGB BLD-MCNC: 9.2 G/DL (ref 11.7–15.7)
LYMPHOCYTES # BLD AUTO: 2.4 10E9/L (ref 0.8–5.3)
LYMPHOCYTES NFR BLD AUTO: 64 %
MCH RBC QN AUTO: 30 PG (ref 26.5–33)
MCHC RBC AUTO-ENTMCNC: 35.2 G/DL (ref 31.5–36.5)
MCV RBC AUTO: 85 FL (ref 78–100)
MONOCYTES # BLD AUTO: 0.1 10E9/L (ref 0–1.3)
MONOCYTES NFR BLD AUTO: 3 %
NEUTROPHILS # BLD AUTO: 1.3 10E9/L (ref 1.6–8.3)
NEUTROPHILS NFR BLD AUTO: 33 %
NRBC # BLD AUTO: 0 10*3/UL
NRBC BLD AUTO-RTO: 1 /100
PLATELET # BLD AUTO: 40 10E9/L (ref 150–450)
POTASSIUM SERPL-SCNC: 5.5 MMOL/L (ref 3.4–5.3)
PROT SERPL-MCNC: 7 G/DL (ref 6.8–8.8)
RBC # BLD AUTO: 3.07 10E12/L (ref 3.8–5.2)
SODIUM SERPL-SCNC: 134 MMOL/L (ref 133–144)
SPECIMEN EXP DATE BLD: NORMAL
WBC # BLD AUTO: 3.8 10E9/L (ref 4–11)

## 2017-02-19 PROCEDURE — 80053 COMPREHEN METABOLIC PANEL: CPT | Performed by: PHYSICIAN ASSISTANT

## 2017-02-19 PROCEDURE — 85025 COMPLETE CBC W/AUTO DIFF WBC: CPT | Performed by: PHYSICIAN ASSISTANT

## 2017-02-19 PROCEDURE — 86901 BLOOD TYPING SEROLOGIC RH(D): CPT | Performed by: PHYSICIAN ASSISTANT

## 2017-02-19 PROCEDURE — 86850 RBC ANTIBODY SCREEN: CPT | Performed by: PHYSICIAN ASSISTANT

## 2017-02-19 PROCEDURE — 36592 COLLECT BLOOD FROM PICC: CPT

## 2017-02-19 PROCEDURE — 86900 BLOOD TYPING SEROLOGIC ABO: CPT | Performed by: PHYSICIAN ASSISTANT

## 2017-02-19 NOTE — PROGRESS NOTES
Infusion Nursing Note:  Bonny Raymundo presents today for Labs/possible tx.    Patient seen by provider today: No   present during visit today: Not Applicable.    Note: Pt given copy of labs.    Intravenous Access:  Labs drawn without difficulty.  PICC.    Treatment Conditions:  Lab Results   Component Value Date    HGB 9.2 02/19/2017     Lab Results   Component Value Date    WBC 3.8 02/19/2017      Lab Results   Component Value Date    ANEU 0.8 02/17/2017     Lab Results   Component Value Date    PLT 40 02/19/2017      Lab Results   Component Value Date     02/19/2017                   Lab Results   Component Value Date    POTASSIUM 5.5 02/19/2017           Lab Results   Component Value Date    MAG 1.4 01/25/2017            Lab Results   Component Value Date    CR 1.26 02/19/2017                   Lab Results   Component Value Date    LEO 8.4 02/19/2017                Lab Results   Component Value Date    BILITOTAL 2.7 02/19/2017           Lab Results   Component Value Date    ALBUMIN 2.9 02/19/2017                    Lab Results   Component Value Date    ALT 39 02/19/2017           Lab Results   Component Value Date    AST 37 02/19/2017     Results reviewed, labs did NOT meet treatment parameters: hgb 9.2 Plt 40.      Post Infusion Assessment:  Patient tolerated labs and dressing change without incident.  Site patent and intact, free from redness, edema or discomfort.    Discharge Plan:   Patient and/or family verbalized understanding of discharge instructions and all questions answered.  Copy of AVS reviewed with patient and/or family.  Patient will return Tuesday for next appointment.  Patient discharged in stable condition accompanied by: self.  Departure Mode: Ambulatory.    Shant Nieto RN

## 2017-02-19 NOTE — MR AVS SNAPSHOT
After Visit Summary   2/19/2017    Bonny Raymundo    MRN: [id number]           Patient Information     Date Of Birth          1945        Visit Information        Provider Department      2/19/2017 8:30 AM SOUTH CHAIR 3 [facility]        Today's Diagnoses     Idiopathic aplastic anemia (H)           Follow-ups after your visit        Your next 10 appointments already scheduled     Feb 21, 2017  9:00 AM CST   Level 5 with SOUTH CHAIR 8   [facility] ([facility])    [facility] [place]  [address]  [place] [address]   [phone]            Feb 24, 2017  9:30 AM CST   Masonic Lab Draw with  MASONIC LAB DRAW   [facility] Lab Draw ([facility])    [address] [place] [address]   [phone]            Feb 24, 2017 10:20 AM CST   (Arrive by 10:05 AM)   Return Visit with Celine Walls PA-C   [facility] ([facility])    [address] [place] [address]   [phone]            Feb 24, 2017 11:00 AM CST   Infusion 360 with UC ONCOLOGY INFUSION, UC 13 ATC   [facility] ([facility])    [address] [place] [address]   [phone]            Feb 26, 2017  8:30 AM CST   Level 5 with SOUTH CHAIR 5   [facility] ([facility])    [facility] [place]  [address]  [place] [address]   [phone]            Feb 28, 2017  8:30 AM CST   Level 5 with SOUTH CHAIR 1   [facility] ([facility])    [facility] [place]  [address]  [place] [address]   [phone]            Mar 03, 2017  9:00 AM CST   Masonic Lab Draw with UC MASONIC LAB DRAW   [facility] ([facility])    [address] [facility] [address] [facility]  [address] [facility] [address]   [phone]            Mar 03, 2017  9:30 AM CST   (Arrive by 9:15 AM)   Return Visit with Celine Walls PA-C   [facility] ([facility])    [address] [address]   [phone]            Mar 03, 2017 10:30 AM CST   Infusion 360 with UC ONCOLOGY INFUSION   [facility] ([facility])    [address] [address]   [phone]              Who to contact     If you have questions or need follow up information about today's clinic visit or your schedule please contact [facility] AND [facility] CENTER directly at [phone].  Normal or non-critical lab and imaging results will be communicated to you by MyChart, letter or phone within 4 business days after the clinic has received the results. If you do not hear from us within 7 days, please contact the clinic through [employer] or phone. If you have a critical or abnormal lab result, we will notify you by phone as soon as possible.  Submit refill requests through [employer] or call your pharmacy and they will forward the refill request to us. Please allow 3 business days for your refill to be completed.          Additional Information About Your Visit        [employer] Information     WorkVoices lets you send messages to your doctor, view your test results, renew your prescriptions, schedule appointments and more. To sign up, go to [email or web address] . Click on \"Log in\" on the left side of the screen, which will take you to the Welcome page. Then click on \"Sign up Now\" on the right side of the page.     You will be asked to enter the access code listed below, as well as some personal information. Please follow the directions to create your username and password.     Your access code is: " [email or web address]  Expires: 3/15/2017 11:47 AM     Your access code will  in 90 days. If you need help or a new code, please call your [facility] or [phone].        Care EveryWhere ID     This is your Care EveryWhere ID. This could be used by other organizations to access your [place] medical records  [id number]        Your Vitals Were     Pulse Temperature Respirations             86 98.1  F (36.7  C) (Oral) 18          Blood Pressure from Last 3 Encounters:   17 130/84   17 (!) 152/93   17 102/66    Weight from Last 3 Encounters:   17 60 kg (132 lb 3.2 oz)   17 60.3 kg (133 lb)   17 60.3 kg (132 lb 15 oz)              We Performed the Following     ABO/Rh type and screen     CBC with platelets differential     Comprehensive metabolic panel        Primary Care Provider Office Phone # Fax #    Rafita Rogel [phone]        PHYSICIANS [address] [facility] [address]  [facility] [address]        Thank you!     Thank you for choosing [facility] AND [facility]ENTER  for your care. Our goal is always to provide you with excellent care. Hearing back from our patients is one way we can continue to improve our services. Please take a few minutes to complete the written survey that you may receive in the mail after your visit with us. Thank you!             Your Updated Medication List - Protect others around you: Learn how to safely use, store and throw away your medicines at www.disposemymeds.org.          This list is accurate as of: 17  9:36 AM.  Always use your most recent med list.                   Brand Name Dispense Instructions for use    BENADRYL ALLERGY 25 MG tablet   Generic drug:  diphenhydrAMINE     56 tablet    Take 1 tablet (25 mg) by mouth nightly as needed       chlorpheniramine 4 MG tablet    CHLOR-TRIMETON     Take 4 mg by mouth every 6 hours as needed. For head cold       COMPRESSION STOCKINGS     2 each    Wear  compression stockings at 20-30 mmHg rating most time during the day to the affected leg (left leg) or both legs. Take them off at night.       * cycloSPORINE modified 100 MG capsule    GENERIC EQUIVALENT    120 capsule    Taking 125 mg BID. This reflects a dose change as of 2/15.       * cycloSPORINE modified 25 MG capsule    GENERIC EQUIVALENT    60 capsule    Take 1 capsule (25 mg) by mouth 2 times daily Along with one 100 mg capsules       diclofenac 1 % Gel topical gel    VOLTAREN    100 g    Apply 2 g topically 2 times daily       eltrombopag 50 MG tablet    PROMACTA    180 tablet    Take 3 tablets (150 mg) by mouth daily Administer on an empty stomach, 1 hour before or 2 hours after a meal. Or as directed       fluconazole 200 MG tablet    DIFLUCAN    30 tablet    Take 1 tablet (200 mg) by mouth daily       levofloxacin 500 MG tablet    LEVAQUIN    30 tablet    Take 1 tablet (500 mg) by mouth daily       loratadine 10 MG tablet    CLARITIN     Take 10 mg by mouth daily as needed. For head cold       nystatin 511684 UNIT/ML suspension    MYCOSTATIN    280 mL    Take 5 mLs (500,000 Units) by mouth 4 times daily Swish and spit.       order for DME     1 Box    Equipment being ordered: knee high compression stockings- 18-20 mm       oxyCODONE 5 MG IR tablet    ROXICODONE    50 tablet    Take 1 tablet (5 mg) by mouth every 6 hours as needed for moderate to severe pain       pantoprazole 40 MG EC tablet    PROTONIX    30 tablet    Take 1 tablet (40 mg) by mouth every morning       polyethylene glycol Packet    MIRALAX/GLYCOLAX    14 packet    Take 17 g by mouth daily . If you start to have loose stools/diarrhea or multiple bowel movements, ok to hold this medication. Then resume if no bowel movement after 24-48hours.       predniSONE 10 MG tablet    DELTASONE    180 tablet    Take 4 tablets (40 mg) by mouth daily Or as directed       SENNA S 8.6-50 MG per tablet   Generic drug:  senna-docusate     100 tablet    Can  take 1-2 tablets up to twice a day. Can start with 1-2 tablets daily. If no bowel movement within 24 hours, can take 1-2 tablets twice a day. HOLD if you develop diarrhea. Resume taking if no bowel movement in 24 hours.       TYLENOL PO      Take 325 mg by mouth every 6 hours as needed for mild pain or fever       * Notice:  This list has 2 medication(s) that are the same as other medications prescribed for you. Read the directions carefully, and ask your doctor or other care provider to review them with you.

## 2017-02-21 ENCOUNTER — INFUSION THERAPY VISIT (OUTPATIENT)
Dept: INFUSION THERAPY | Facility: CLINIC | Age: 72
End: 2017-02-21
Attending: PHYSICIAN ASSISTANT
Payer: COMMERCIAL

## 2017-02-21 ENCOUNTER — HOSPITAL ENCOUNTER (OUTPATIENT)
Facility: CLINIC | Age: 72
Setting detail: SPECIMEN
Discharge: HOME | End: 2017-02-21
Attending: PHYSICIAN ASSISTANT | Admitting: PHYSICIAN ASSISTANT
Payer: COMMERCIAL

## 2017-02-21 ENCOUNTER — CARE COORDINATION (OUTPATIENT)
Dept: ONCOLOGY | Facility: CLINIC | Age: 72
End: 2017-02-21

## 2017-02-21 VITALS
RESPIRATION RATE: 16 BRPM | HEART RATE: 80 BPM | DIASTOLIC BLOOD PRESSURE: 72 MMHG | TEMPERATURE: 97.6 F | SYSTOLIC BLOOD PRESSURE: 130 MMHG

## 2017-02-21 DIAGNOSIS — D61.818 PANCYTOPENIA (H): ICD-10-CM

## 2017-02-21 DIAGNOSIS — D61.3 IDIOPATHIC APLASTIC ANEMIA (H): Primary | ICD-10-CM

## 2017-02-21 LAB
ALBUMIN SERPL-MCNC: 2.9 G/DL (ref 3.4–5)
ALBUMIN SERPL-MCNC: 3 G/DL (ref 3.4–5)
ALP SERPL-CCNC: 80 U/L (ref 40–150)
ALP SERPL-CCNC: 83 U/L (ref 40–150)
ALT SERPL W P-5'-P-CCNC: 87 U/L (ref 0–50)
ALT SERPL W P-5'-P-CCNC: 93 U/L (ref 0–50)
ANION GAP SERPL CALCULATED.3IONS-SCNC: 9 MMOL/L (ref 3–14)
ANION GAP SERPL CALCULATED.3IONS-SCNC: 9 MMOL/L (ref 3–14)
AST SERPL W P-5'-P-CCNC: 60 U/L (ref 0–45)
AST SERPL W P-5'-P-CCNC: 82 U/L (ref 0–45)
BASOPHILS # BLD AUTO: 0 10E9/L (ref 0–0.2)
BASOPHILS NFR BLD AUTO: 0 %
BILIRUB SERPL-MCNC: 3 MG/DL (ref 0.2–1.3)
BILIRUB SERPL-MCNC: 3.4 MG/DL (ref 0.2–1.3)
BLD PROD TYP BPU: NORMAL
BLD PROD TYP BPU: NORMAL
BLD UNIT ID BPU: 0
BLOOD PRODUCT CODE: NORMAL
BPU ID: NORMAL
BUN SERPL-MCNC: 49 MG/DL (ref 7–30)
BUN SERPL-MCNC: 50 MG/DL (ref 7–30)
CALCIUM SERPL-MCNC: 8.5 MG/DL (ref 8.5–10.1)
CALCIUM SERPL-MCNC: 8.7 MG/DL (ref 8.5–10.1)
CHLORIDE SERPL-SCNC: 101 MMOL/L (ref 94–109)
CHLORIDE SERPL-SCNC: 101 MMOL/L (ref 94–109)
CO2 SERPL-SCNC: 24 MMOL/L (ref 20–32)
CO2 SERPL-SCNC: 26 MMOL/L (ref 20–32)
CREAT SERPL-MCNC: 1.45 MG/DL (ref 0.52–1.04)
CREAT SERPL-MCNC: 1.51 MG/DL (ref 0.52–1.04)
CYCLOSPORINE BLD LC/MS/MS-MCNC: 296 UG/L (ref 50–400)
DIFFERENTIAL METHOD BLD: ABNORMAL
EOSINOPHIL # BLD AUTO: 0 10E9/L (ref 0–0.7)
EOSINOPHIL NFR BLD AUTO: 0 %
ERYTHROCYTE [DISTWIDTH] IN BLOOD BY AUTOMATED COUNT: 17.6 % (ref 10–15)
GFR SERPL CREATININE-BSD FRML MDRD: 34 ML/MIN/1.7M2
GFR SERPL CREATININE-BSD FRML MDRD: 35 ML/MIN/1.7M2
GLUCOSE SERPL-MCNC: 102 MG/DL (ref 70–99)
GLUCOSE SERPL-MCNC: 89 MG/DL (ref 70–99)
HCT VFR BLD AUTO: 25.3 % (ref 35–47)
HGB BLD-MCNC: 9 G/DL (ref 11.7–15.7)
LYMPHOCYTES # BLD AUTO: 1.9 10E9/L (ref 0.8–5.3)
LYMPHOCYTES NFR BLD AUTO: 69 %
MCH RBC QN AUTO: 30.2 PG (ref 26.5–33)
MCHC RBC AUTO-ENTMCNC: 35.6 G/DL (ref 31.5–36.5)
MCV RBC AUTO: 85 FL (ref 78–100)
MONOCYTES # BLD AUTO: 0.1 10E9/L (ref 0–1.3)
MONOCYTES NFR BLD AUTO: 2 %
NEUTROPHILS # BLD AUTO: 0.8 10E9/L (ref 1.6–8.3)
NEUTROPHILS NFR BLD AUTO: 29 %
NRBC # BLD AUTO: 0.1 10*3/UL
NRBC BLD AUTO-RTO: 4 /100
NUM BPU REQUESTED: 1
PLATELET # BLD AUTO: 24 10E9/L (ref 150–450)
POTASSIUM SERPL-SCNC: 4.1 MMOL/L (ref 3.4–5.3)
POTASSIUM SERPL-SCNC: 6.3 MMOL/L (ref 3.4–5.3)
PROT SERPL-MCNC: 6.8 G/DL (ref 6.8–8.8)
PROT SERPL-MCNC: 7.1 G/DL (ref 6.8–8.8)
RBC # BLD AUTO: 2.98 10E12/L (ref 3.8–5.2)
SMUDGE CELLS BLD QL SMEAR: PRESENT
SODIUM SERPL-SCNC: 134 MMOL/L (ref 133–144)
SODIUM SERPL-SCNC: 136 MMOL/L (ref 133–144)
STOMATOCYTES BLD QL SMEAR: SLIGHT
TME LAST DOSE: NORMAL H
TRANSFUSION STATUS PATIENT QL: NORMAL
TRANSFUSION STATUS PATIENT QL: NORMAL
WBC # BLD AUTO: 2.8 10E9/L (ref 4–11)

## 2017-02-21 PROCEDURE — P9037 PLATE PHERES LEUKOREDU IRRAD: HCPCS | Performed by: PHYSICIAN ASSISTANT

## 2017-02-21 PROCEDURE — 80053 COMPREHEN METABOLIC PANEL: CPT | Performed by: PHYSICIAN ASSISTANT

## 2017-02-21 PROCEDURE — 80053 COMPREHEN METABOLIC PANEL: CPT | Performed by: INTERNAL MEDICINE

## 2017-02-21 PROCEDURE — 85025 COMPLETE CBC W/AUTO DIFF WBC: CPT | Performed by: PHYSICIAN ASSISTANT

## 2017-02-21 PROCEDURE — 80158 DRUG ASSAY CYCLOSPORINE: CPT | Performed by: INTERNAL MEDICINE

## 2017-02-21 PROCEDURE — 36430 TRANSFUSION BLD/BLD COMPNT: CPT

## 2017-02-21 ASSESSMENT — PAIN SCALES - GENERAL: PAINLEVEL: MILD PAIN (2)

## 2017-02-21 NOTE — PROGRESS NOTES
Called Ms. Raymundo this afternoon to review with her the labs that were drawn today.  Her liver function tests were slightly elevated as well as her creatinine.  Per Sejal Walls PA-C, we are asking Ms. Raymundo to HOLD her CSA until we get her level back later today.  Trying to determine if the dose needs to be adjusted again or not.  Patient did not answer her phone so a detailed message was left for her to return call so that we know she got the message.

## 2017-02-21 NOTE — MR AVS SNAPSHOT
After Visit Summary   2/21/2017    Bonny Raymundo    MRN: [id number]           Patient Information     Date Of Birth          1945        Visit Information        Provider Department      2/21/2017 9:00 AM SOUTH CHAIR 8 [facility]        Today's Diagnoses     Idiopathic aplastic anemia (H)    -  1    Pancytopenia (H)           Follow-ups after your visit        Your next 10 appointments already scheduled     Feb 24, 2017  9:30 AM CST   Masonic Lab Draw with  MASONIC LAB DRAW   [facility] ([facility])    [address] [place] [address]   [phone]            Feb 24, 2017 10:20 AM CST   (Arrive by 10:05 AM)   Return Visit with Celine Walls PA-C   [facility] ([facility])    [address] [place] [address]   [phone]            Feb 24, 2017 11:00 AM CST   Infusion 360 with  ONCOLOGY INFUSION, UC 13 ATC   [facility] ([facility])    [address] [place] [address]   [phone]            Feb 26, 2017  8:30 AM CST   Level 5 with SOUTH CHAIR 5   [facility] ([facility])    [facility]  [address]  [place] [address]   [phone]            Feb 28, 2017  8:30 AM CST   Level 5 with SOUTH CHAIR 1   [facility] ([facility])    [facility]  [address]  [place] [address]   [phone]            Mar 03, 2017  9:00 AM CST   Masonic Lab Draw with  MASONIC LAB DRAW   [facility] ([facility])    [address] [place] [address]   [phone]            Mar 03, 2017  9:30 AM CST   (Arrive by 9:15 AM)   Return Visit with  "Celine Walls PA-C   [facility] ([facility] [place])    [address] [address] [facility] [address] [phone]            Mar 03, 2017 10:30 AM CST   Infusion 360 with UC ONCOLOGY INFUSION, UC 16 ATC   [facility] ([facility])    [address] [address]  [address] [facility] [id number] [phone]              Future tests that were ordered for you today     Open Standing Orders        Priority Remaining Interval Expires Ordered    Platelets prepare order unit Routine 99/100 CONDITIONAL (SPECIFY) BLOOD  2/21/2017    Transfuse platelets unit Routine 99/100 TRANSFUSE 1 DOSE  2/21/2017            Who to contact     If you have questions or need follow up information about today's clinic visit or your schedule please contact [facility]ION CENTER directly at [phone].  Normal or non-critical lab and imaging results will be communicated to you by InCortahart, letter or phone within 4 business days after the clinic has received the results. If you do not hear from us within 7 days, please contact the clinic through [employer] or phone. If you have a critical or abnormal lab result, we will notify you by phone as soon as possible.  Submit refill requests through [employer] or call your pharmacy and they will forward the refill request to us. Please allow 3 business days for your refill to be completed.          Additional Information About Your Visit        [employer] Information     [employer] lets you send messages to your doctor, view your test results, renew your prescriptions, schedule appointments and more. To sign up, go to [email or web address] . Click on \"Log in\" on the left side of the screen, which will take you to the Welcome page. Then click on \"Sign up Now\" on the right side of the page.     You will be asked to enter the access code listed below, as well as some personal information. " Please follow the directions to create your username and password.     Your access code is: [email or web address]  Expires: 3/15/2017 11:47 AM     Your access code will  in 90 days. If you need help or a new code, please call your [place] clinic or [phone].        Care EveryWhere ID     This is your Care EveryWhere ID. This could be used by other organizations to access your [place] medical records  [id number]        Your Vitals Were     Pulse Temperature Respirations             80 97.6  F (36.4  C) (Oral) 16          Blood Pressure from Last 3 Encounters:   17 130/72   17 130/84   17 (!) 152/93    Weight from Last 3 Encounters:   17 60 kg (132 lb 3.2 oz)   17 60.3 kg (133 lb)   17 60.3 kg (132 lb 15 oz)              We Performed the Following     Blood component     CBC with platelets differential     Comprehensive metabolic panel     Comprehensive metabolic panel     Cyclosporine     Platelets prepare order unit     Transfuse platelets unit        Primary Care Provider Office Phone # Fax #    Rafita Spencer Rogel [phone]        PHYSICIANS [address] [facility] [address]  [facility] [address]        Thank you!     Thank you for choosing [facility]CER CLINIC AND [facility] CENTER  for your care. Our goal is always to provide you with excellent care. Hearing back from our patients is one way we can continue to improve our services. Please take a few minutes to complete the written survey that you may receive in the mail after your visit with us. Thank you!             Your Updated Medication List - Protect others around you: Learn how to safely use, store and throw away your medicines at www.disposemymeds.org.          This list is accurate as of: 17  1:00 PM.  Always use your most recent med list.                   Brand Name Dispense Instructions for use    BENADRYL ALLERGY 25 MG tablet   Generic drug:  diphenhydrAMINE     56 tablet    Take 1 tablet  (25 mg) by mouth nightly as needed       chlorpheniramine 4 MG tablet    CHLOR-TRIMETON     Take 4 mg by mouth every 6 hours as needed. For head cold       COMPRESSION STOCKINGS     2 each    Wear compression stockings at 20-30 mmHg rating most time during the day to the affected leg (left leg) or both legs. Take them off at night.       * cycloSPORINE modified 100 MG capsule    GENERIC EQUIVALENT    120 capsule    Taking 125 mg BID. This reflects a dose change as of 2/15.       * cycloSPORINE modified 25 MG capsule    GENERIC EQUIVALENT    60 capsule    Take 1 capsule (25 mg) by mouth 2 times daily Along with one 100 mg capsules       diclofenac 1 % Gel topical gel    VOLTAREN    100 g    Apply 2 g topically 2 times daily       eltrombopag 50 MG tablet    PROMACTA    180 tablet    Take 3 tablets (150 mg) by mouth daily Administer on an empty stomach, 1 hour before or 2 hours after a meal. Or as directed       fluconazole 200 MG tablet    DIFLUCAN    30 tablet    Take 1 tablet (200 mg) by mouth daily       levofloxacin 500 MG tablet    LEVAQUIN    30 tablet    Take 1 tablet (500 mg) by mouth daily       loratadine 10 MG tablet    CLARITIN     Take 10 mg by mouth daily as needed. For head cold       nystatin [id number] UNIT/ML suspension    MYCOSTATIN    280 mL    Take 5 mLs (500,000 Units) by mouth 4 times daily Swish and spit.       order for DME     1 Box    Equipment being ordered: knee high compression stockings- 18-20 mm       oxyCODONE 5 MG IR tablet    ROXICODONE    50 tablet    Take 1 tablet (5 mg) by mouth every 6 hours as needed for moderate to severe pain       pantoprazole 40 MG EC tablet    PROTONIX    30 tablet    Take 1 tablet (40 mg) by mouth every morning       polyethylene glycol Packet    MIRALAX/GLYCOLAX    14 packet    Take 17 g by mouth daily . If you start to have loose stools/diarrhea or multiple bowel movements, ok to hold this medication. Then resume if no bowel movement after 24-48hours.        predniSONE 10 MG tablet    DELTASONE    180 tablet    Take 4 tablets (40 mg) by mouth daily Or as directed       SENNA S 8.6-50 MG per tablet   Generic drug:  senna-docusate     100 tablet    Can take 1-2 tablets up to twice a day. Can start with 1-2 tablets daily. If no bowel movement within 24 hours, can take 1-2 tablets twice a day. HOLD if you develop diarrhea. Resume taking if no bowel movement in 24 hours.       TYLENOL PO      Take 325 mg by mouth every 6 hours as needed for mild pain or fever       * Notice:  This list has 2 medication(s) that are the same as other medications prescribed for you. Read the directions carefully, and ask your doctor or other care provider to review them with you.

## 2017-02-21 NOTE — PROGRESS NOTES
Infusion Nursing Note:  Bonny Raymundo presents today for labs and possible transfusion.    Patient seen by provider today: No   present during visit today: Not Applicable.    Note: Alexi Cyclosporine level pt did not take her cyclosporine pills today yet..  Labs that were drawn from her Left PICC line came back sl elevated see ALT K+ 6.3 I put out a call to Светлана Nolasco who spoke with Abilio and they wanted me to repeat the CMP with a peripheral draw which I drew and results are pending. Repeat potassium came back at 4.1  Intravenous Access:  Labs drawn without difficulty.  PICC.  #23 butterfly to Right AC for repeat CMP  Treatment Conditions:  Lab Results   Component Value Date    HGB 9.0 02/21/2017     Lab Results   Component Value Date    WBC 2.8 02/21/2017      Lab Results   Component Value Date    ANEU 0.8 02/21/2017     Lab Results   Component Value Date    PLT 24 02/21/2017      Lab Results   Component Value Date     02/21/2017                   Lab Results   Component Value Date    POTASSIUM 4.1 02/21/2017           Lab Results   Component Value Date    MAG 1.4 01/25/2017            Lab Results   Component Value Date    CR 1.45 02/21/2017                   Lab Results   Component Value Date    LEO 8.7 02/21/2017                Lab Results   Component Value Date    BILITOTAL 3.0 02/21/2017           Lab Results   Component Value Date    ALBUMIN 3.0 02/21/2017                    Lab Results   Component Value Date    ALT 87 02/21/2017           Lab Results   Component Value Date    AST 60 02/21/2017     Results reviewed, labs MET treatment parameters, ok to proceed with treatment.  Blood transfusion consent signed 10/6/2017.      Post Infusion Assessment:  Patient tolerated infusion without incident.  Blood return noted pre and post infusion.  Site patent and intact, free from redness, edema or discomfort.  No evidence of extravasations.    Discharge Plan:   Discharge instructions  reviewed with: Patient.  Patient and/or family verbalized understanding of discharge instructions and all questions answered.  Copy of AVS reviewed with patient and/or family.  Patient will return 2/26/2017 for next appointment.  Patient discharged in stable condition accompanied by: self.  Departure Mode: Ambulatory.    Evelyn Bruner RN

## 2017-02-22 ENCOUNTER — CARE COORDINATION (OUTPATIENT)
Dept: ONCOLOGY | Facility: CLINIC | Age: 72
End: 2017-02-22

## 2017-02-22 NOTE — PROGRESS NOTES
Called patient to report to her that her CSA level was 296.  Per Sejal Walls, she is to restart her CSA this evening at 100 MG and continue with that dose BID.  Patient verbalized understanding and will resume her CSA this evening at 100 MG.  Reviewed schedule with patient and patient verbalized understanding.

## 2017-02-23 LAB
BLD PROD TYP BPU: NORMAL
NUM BPU REQUESTED: 1

## 2017-02-24 ENCOUNTER — INFUSION THERAPY VISIT (OUTPATIENT)
Dept: ONCOLOGY | Facility: CLINIC | Age: 72
End: 2017-02-24
Attending: INTERNAL MEDICINE
Payer: COMMERCIAL

## 2017-02-24 VITALS
WEIGHT: 133 LBS | RESPIRATION RATE: 16 BRPM | SYSTOLIC BLOOD PRESSURE: 125 MMHG | DIASTOLIC BLOOD PRESSURE: 81 MMHG | OXYGEN SATURATION: 94 % | TEMPERATURE: 97.6 F | BODY MASS INDEX: 23.56 KG/M2 | HEART RATE: 89 BPM

## 2017-02-24 VITALS
RESPIRATION RATE: 16 BRPM | OXYGEN SATURATION: 95 % | SYSTOLIC BLOOD PRESSURE: 141 MMHG | TEMPERATURE: 97.5 F | HEART RATE: 84 BPM | DIASTOLIC BLOOD PRESSURE: 80 MMHG

## 2017-02-24 DIAGNOSIS — D61.818 PANCYTOPENIA (H): Primary | ICD-10-CM

## 2017-02-24 DIAGNOSIS — D61.3 IDIOPATHIC APLASTIC ANEMIA (H): ICD-10-CM

## 2017-02-24 DIAGNOSIS — D61.818 PANCYTOPENIA (H): ICD-10-CM

## 2017-02-24 LAB
ABO + RH BLD: NORMAL
ABO + RH BLD: NORMAL
ALBUMIN SERPL-MCNC: 3 G/DL (ref 3.4–5)
ALP SERPL-CCNC: 79 U/L (ref 40–150)
ALT SERPL W P-5'-P-CCNC: 49 U/L (ref 0–50)
ANION GAP SERPL CALCULATED.3IONS-SCNC: 11 MMOL/L (ref 3–14)
ANISOCYTOSIS BLD QL SMEAR: ABNORMAL
AST SERPL W P-5'-P-CCNC: 27 U/L (ref 0–45)
BASOPHILS # BLD AUTO: 0 10E9/L (ref 0–0.2)
BASOPHILS NFR BLD AUTO: 0 %
BILIRUB SERPL-MCNC: 3 MG/DL (ref 0.2–1.3)
BLD GP AB SCN SERPL QL: NORMAL
BLD PROD TYP BPU: NORMAL
BLD UNIT ID BPU: 0
BLOOD BANK CMNT PATIENT-IMP: NORMAL
BLOOD PRODUCT CODE: NORMAL
BPU ID: NORMAL
BUN SERPL-MCNC: 51 MG/DL (ref 7–30)
CALCIUM SERPL-MCNC: 8.6 MG/DL (ref 8.5–10.1)
CHLORIDE SERPL-SCNC: 102 MMOL/L (ref 94–109)
CO2 SERPL-SCNC: 24 MMOL/L (ref 20–32)
CREAT SERPL-MCNC: 1.39 MG/DL (ref 0.52–1.04)
DIFFERENTIAL METHOD BLD: ABNORMAL
EOSINOPHIL # BLD AUTO: 0 10E9/L (ref 0–0.7)
EOSINOPHIL NFR BLD AUTO: 0 %
ERYTHROCYTE [DISTWIDTH] IN BLOOD BY AUTOMATED COUNT: 18.5 % (ref 10–15)
GFR SERPL CREATININE-BSD FRML MDRD: 37 ML/MIN/1.7M2
GLUCOSE SERPL-MCNC: 104 MG/DL (ref 70–99)
HCT VFR BLD AUTO: 22.6 % (ref 35–47)
HGB BLD-MCNC: 7.9 G/DL (ref 11.7–15.7)
LYMPHOCYTES # BLD AUTO: 1.7 10E9/L (ref 0.8–5.3)
LYMPHOCYTES NFR BLD AUTO: 57 %
MCH RBC QN AUTO: 30.4 PG (ref 26.5–33)
MCHC RBC AUTO-ENTMCNC: 35 G/DL (ref 31.5–36.5)
MCV RBC AUTO: 87 FL (ref 78–100)
MONOCYTES # BLD AUTO: 0.2 10E9/L (ref 0–1.3)
MONOCYTES NFR BLD AUTO: 5 %
NEUTROPHILS # BLD AUTO: 1.1 10E9/L (ref 1.6–8.3)
NEUTROPHILS NFR BLD AUTO: 38 %
NRBC # BLD AUTO: 0.2 10*3/UL
NRBC BLD AUTO-RTO: 6 /100
NUM BPU REQUESTED: 2
PLATELET # BLD AUTO: 22 10E9/L (ref 150–450)
POIKILOCYTOSIS BLD QL SMEAR: SLIGHT
POTASSIUM SERPL-SCNC: 4.7 MMOL/L (ref 3.4–5.3)
PROT SERPL-MCNC: 6.7 G/DL (ref 6.8–8.8)
RBC # BLD AUTO: 2.6 10E12/L (ref 3.8–5.2)
SMUDGE CELLS BLD QL SMEAR: PRESENT
SODIUM SERPL-SCNC: 138 MMOL/L (ref 133–144)
SPECIMEN EXP DATE BLD: NORMAL
TARGETS BLD QL SMEAR: SLIGHT
TRANSFUSION STATUS PATIENT QL: NORMAL
WBC # BLD AUTO: 3 10E9/L (ref 4–11)

## 2017-02-24 PROCEDURE — 94642 AEROSOL INHALATION TREATMENT: CPT

## 2017-02-24 PROCEDURE — 86901 BLOOD TYPING SEROLOGIC RH(D): CPT | Performed by: PHYSICIAN ASSISTANT

## 2017-02-24 PROCEDURE — P9040 RBC LEUKOREDUCED IRRADIATED: HCPCS | Performed by: PHYSICIAN ASSISTANT

## 2017-02-24 PROCEDURE — 85025 COMPLETE CBC W/AUTO DIFF WBC: CPT | Performed by: PHYSICIAN ASSISTANT

## 2017-02-24 PROCEDURE — 86923 COMPATIBILITY TEST ELECTRIC: CPT | Performed by: PHYSICIAN ASSISTANT

## 2017-02-24 PROCEDURE — 99212 OFFICE O/P EST SF 10 MIN: CPT | Mod: ZF

## 2017-02-24 PROCEDURE — 25000128 H RX IP 250 OP 636: Mod: ZF | Performed by: PHYSICIAN ASSISTANT

## 2017-02-24 PROCEDURE — 80053 COMPREHEN METABOLIC PANEL: CPT | Performed by: PHYSICIAN ASSISTANT

## 2017-02-24 PROCEDURE — 25000125 ZZHC RX 250: Mod: ZF | Performed by: NURSE PRACTITIONER

## 2017-02-24 PROCEDURE — 99214 OFFICE O/P EST MOD 30 MIN: CPT | Mod: ZP | Performed by: PHYSICIAN ASSISTANT

## 2017-02-24 PROCEDURE — 86900 BLOOD TYPING SEROLOGIC ABO: CPT | Performed by: PHYSICIAN ASSISTANT

## 2017-02-24 PROCEDURE — 94640 AIRWAY INHALATION TREATMENT: CPT

## 2017-02-24 PROCEDURE — P9037 PLATE PHERES LEUKOREDU IRRAD: HCPCS | Performed by: PHYSICIAN ASSISTANT

## 2017-02-24 PROCEDURE — 36430 TRANSFUSION BLD/BLD COMPNT: CPT

## 2017-02-24 PROCEDURE — 86850 RBC ANTIBODY SCREEN: CPT | Performed by: PHYSICIAN ASSISTANT

## 2017-02-24 RX ORDER — PENTAMIDINE ISETHIONATE 300 MG/300MG
300 INHALANT RESPIRATORY (INHALATION) ONCE
Status: CANCELLED
Start: 2017-03-01 | End: 2017-03-01

## 2017-02-24 RX ORDER — PENTAMIDINE ISETHIONATE 300 MG/300MG
300 INHALANT RESPIRATORY (INHALATION) ONCE
Status: COMPLETED | OUTPATIENT
Start: 2017-02-24 | End: 2017-02-24

## 2017-02-24 RX ORDER — ALBUTEROL SULFATE 5 MG/ML
2.5 SOLUTION RESPIRATORY (INHALATION) EVERY 6 HOURS PRN
Status: CANCELLED
Start: 2017-03-01

## 2017-02-24 RX ORDER — CYCLOSPORINE 100 MG/1
100 CAPSULE, LIQUID FILLED ORAL 2 TIMES DAILY
Qty: 120 CAPSULE | Refills: 0 | COMMUNITY
Start: 2017-02-24 | End: 2017-03-03

## 2017-02-24 RX ORDER — ALBUTEROL SULFATE 0.83 MG/ML
2.5 SOLUTION RESPIRATORY (INHALATION) EVERY 6 HOURS PRN
Status: DISCONTINUED | OUTPATIENT
Start: 2017-02-24 | End: 2017-02-24 | Stop reason: HOSPADM

## 2017-02-24 RX ORDER — LEVOFLOXACIN 250 MG/1
250 TABLET, FILM COATED ORAL DAILY
Qty: 30 TABLET | Refills: 0 | COMMUNITY
Start: 2017-02-24 | End: 2017-04-24

## 2017-02-24 RX ORDER — PREDNISONE 10 MG/1
30 TABLET ORAL DAILY
Qty: 180 TABLET | Refills: 3 | COMMUNITY
Start: 2017-02-24 | End: 2017-03-09

## 2017-02-24 RX ADMIN — ALBUTEROL SULFATE 2.5 MG: 2.5 SOLUTION RESPIRATORY (INHALATION) at 14:41

## 2017-02-24 RX ADMIN — SODIUM CHLORIDE 250 ML: 9 INJECTION, SOLUTION INTRAVENOUS at 14:05

## 2017-02-24 RX ADMIN — PENTAMIDINE ISETHIONATE 300 MG: 300 INHALANT RESPIRATORY (INHALATION) at 14:52

## 2017-02-24 ASSESSMENT — PAIN SCALES - GENERAL: PAINLEVEL: MILD PAIN (3)

## 2017-02-24 NOTE — PROGRESS NOTES
Infusion Nursing Note:  Bonny Raymundo presents today for 1 pack of platelets, 1 unit of blood, pentamidine nebulizer.    Patient seen by provider today: Yes: DOROTA Patricia    Intravenous Access:  PICC.    Treatment Conditions:  Lab Results   Component Value Date    HGB 7.9 02/24/2017     Lab Results   Component Value Date    WBC 3.0 02/24/2017      Lab Results   Component Value Date    ANEU 1.1 02/24/2017     Lab Results   Component Value Date    PLT 22 02/24/2017      Lab Results   Component Value Date     02/24/2017                   Lab Results   Component Value Date    POTASSIUM 4.7 02/24/2017           Lab Results   Component Value Date    MAG 1.4 01/25/2017            Lab Results   Component Value Date    CR 1.39 02/24/2017                   Lab Results   Component Value Date    LEO 8.6 02/24/2017                Lab Results   Component Value Date    BILITOTAL 3.0 02/24/2017           Lab Results   Component Value Date    ALBUMIN 3.0 02/24/2017                    Lab Results   Component Value Date    ALT 49 02/24/2017           Lab Results   Component Value Date    AST 27 02/24/2017     Results reviewed, labs MET treatment parameters, ok to proceed with treatment.  Blood transfusion consent signed 10/6/16.    Post Infusion Assessment:  Patient tolerated infusion without incident.  Blood return noted pre and post infusion.  Site patent and intact, free from redness, edema or discomfort.  No evidence of extravasations.    Discharge Plan:   Patient declined prescription refills.  Copy of AVS reviewed with patient and/or family.  Patient will return 2/26/17 for next appointment.  Patient discharged in stable condition accompanied by: self.  Departure Mode: Wheelchair.    Jyoti Alejandre RN

## 2017-02-24 NOTE — PROGRESS NOTES
[facility] CANCER CLINIC  FOLLOW-UP VISIT NOTE  Date of visit:   2/24/2017        REASON FOR VISIT: Aplastic anemia, weekly follow up    HPI: Bonny is a 71 year old female who presented in the fall of 2016 with fatigue and shortness of breath. 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  Her counts showed pancytopenia and BMBX was concerning for aplastic anemia.  By December of 2016 she was transfusion dependent with platelets under 10 k and ANC dropped under 500. 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  She has met with Dr Mcdaniel at [facility] and consulted with Dr Rogel at [facility].      After further consultation it was decided to admit on 1/9/17 for ATG/cyclosporine/prednisone therapy.     The following was her inpatient regimen:  Day 1= 1/9/17  ATG 2500 mg (40 mg/kg0 q24 hours D-4)  Cyclosporine 200 mg (3 mg/kg) PO bid  Eltrombopag 50 mg daily  Methylpred 31 mg (0.5 mg/kg) q24 hours D1-4  Prednisone 60 mg (1 mg/kg) daily after completion of IV methylpred to continue for at least 2 weeks    Bonny also had a admission 1/23-1/25/17 for rectal bleeding (presumed hemorrhoidal).  See DC summary for details.     INTERVAL HISTORY: Bonny is here today for her weekly follow up. Bonny feels that she is feeling the same as she has been. She has had OCASIO for the last few weeks. She continues to have a dry mouth and reports she is drinking about 1 liter of fluids a day. She continues to eat good for meals and family/friends continue to bring her food. Her low back pain is the same as last week. Bonny is using heat, tylenol (2-3 times a day) and oxycodone (0-2 times a day) which she feels is manageable. No fevers, chest pain and and no bleeding. Daily BM, no black/blood. No  issues.     ROS: complete review of systems was performed which was negative unless noted above.    EXAM:  /81 (BP Location: Right arm, Patient Position: Chair, Cuff  Size: Adult Regular)  Pulse 89  Temp 97.6  F (36.4  C) (Oral)  Resp 16  Wt 60.3 kg (133 lb)  SpO2 94%  BMI 23.56 kg/m2  Wt Readings from Last 4 Encounters:   02/24/17 60.3 kg (133 lb)   02/17/17 60 kg (132 lb 3.2 oz)   02/02/17 60.3 kg (133 lb)   01/26/17 60.3 kg (132 lb 15 oz)     General: Patient alert and oriented x3.    EYES: PERRLA, conjunctiva pink, sclera is jaundice.   Neck: No palpable cervical, supraclavicular or axillary nodes bilaterally.   Cardiovascular: RRR, no murmurs, gallops or rubs  Respiratory: clear to auscultation bilaterally;  no crackles, rhonchi or wheezing.   Abdomen: positive bowel sounds in all four quadrants, soft, nontender  Skin: light jaundice, intact  Neuro: grossly intact.   Mood and affect is stable.       LABS:        2/24/2017 10:10   Sodium 138   Potassium 4.7   Chloride 102   Carbon Dioxide 24   Urea Nitrogen 51 (H)   Creatinine 1.39 (H)   GFR Estimate 37 (L)   GFR Estimate If Black 45 (L)   Calcium 8.6   Anion Gap 11   Albumin 3.0 (L)   Protein Total 6.7 (L)   Bilirubin Total 3.0 (H)   Alkaline Phosphatase 79   ALT 49   AST 27   Glucose 104 (H)   WBC 3.0 (L)   Hemoglobin 7.9 (L)   Hematocrit 22.6 (L)   Platelet Count 22 (LL)   RBC Count 2.60 (L)   MCV 87   MCH 30.4   MCHC 35.0   RDW 18.5 (H)   Diff Method Manual Differential   % Neutrophils 38.0   % Lymphocytes 57.0   % Monocytes 5.0   % Eosinophils 0.0   % Basophils 0.0   Nucleated RBCs 6 (H)   Absolute Neutrophil 1.1 (L)   Absolute Lymphocytes 1.7   Absolute Monocytes 0.2   Absolute Eosinophils 0.0   Absolute Basophils 0.0   Absolute Nucleated RBC 0.2   Anisocytosis Moderate   Poikilocytosis Slight   Target Cells Slight   Smudge Cells Present         ASSESSMENT/PLAN: 71 year old female with AA, currently pancytopenic 2/2 to disease and further worsening from recent therapy    HEME- Treatment for AA 1/9-1/13/17 with ATG, methylpred, cyclosporine and eltrombopag.   -cyclosporine 100 mg BID decreased (2/22) due to ELSA  Improved  today, thus keep at same dose. Continue weekly checks, keep level between 200-350. Next check on 2/28 at [facility].   -decrease to 30 mg Prednisone starting 2/25/17. Will decrease by 10 mg and continue EVERY OTHER WEEK  -eltrombopag 150 mg daily  -Will need transfusion support 3 x week, worked on schedule today mostly at  [facility] (Sunday, Tuesday each week, Lucy Friday)  -transfuse if hgb <8 and platelets <30k   - will get 1 unit of PRBC and platelets today    CV-two prior provoked DVT in 2012 and 2014 with travel. 2012 was associated with PE. Was on warfarin/lovenox in the past.  Transitioned to ASA which has been held in the setting of low platelets  -Norvasc stopped 1/25, BP stable    ID- ANC improved today at 1100. No fever or symptoms of infection.  -continue flucon 200 mg daily  - Will dcrease levaquin to 250 mg daily due to CrCl = 34.3 mL/min  - CMV was neg  - inh pentamidine done today 2/24/17, next 3/24/17    MSK- low back pain for 5 weeks.  Low thoracic mid spine pain.  Last CXR showed compression fractures in her vertebral bodies.  Need to get some further imaging at some point.  For now continue prn tylenol and oxycodone    FEN: weight stable.  Fluid intake sub optimal, suggested over a liter of fluid intake daily. Creat 1.39- likely 2/2 to CSA - which is improving with CSA dose decreasing.    GI: mild constipation, continue alternating senna + miralax.   -bili elevation, hemolysis was r/o, started with therapy, check again 2/28 (CMP + CSA level)  -continue ppx protonix      Alina GAVIRIA NP-C [facility]    I saw patient and performed the ROS and exam myself.  The assessment and plan were mutually discussed and this note was edited to reflect my findings.  Sejal Walls PA-C

## 2017-02-24 NOTE — NURSING NOTE
Chief Complaint   Patient presents with     Blood Draw     Labs drawn by RN from PICC. VS taken.      Labs drawn from grey lumen. Both lumen flushed with NS.   Guerline Lopez RN

## 2017-02-24 NOTE — MR AVS SNAPSHOT
After Visit Summary   2/24/2017    Bonny Raymundo    MRN: [id number]           Patient Information     Date Of Birth          1945        Visit Information        Provider Department      2/24/2017 11:00 AM  13 ATC;  ONCOLOGY INFUSION [facility]        Today's Diagnoses     Pancytopenia (H)    -  1    Idiopathic aplastic anemia (H)          Care Instructions    Post Blood Products Discharge Instructions    You have just received a blood product.  During the next 48 hours, please be aware of the following symptoms of a blood product reaction.  If you should experience any of these symptoms call your physician.    -Temperature 100.0 or greater  -Shaking or chills  -Shortness of breath or wheezing  -Headache  -Persistent non-productive cough  -Hives  -Itching  -Extreme weakness  -Facial swelling or flushing  -Red urine    If you experience any of these symptoms, please call triage at [phone] (Monday through  Friday 8:00 AM-4:30 PM):     If after hours, weekends or holidays, please call:  [facility] page  at [phone] and ask for the doctor on call for Oncology/Hematology or Women's Health service   Or Emergency Department at [phone]             February 2017 Sunday Monday Tuesday Wednesday Thursday Friday Saturday                  1     2     [facility] LAB DRAW   11:30 AM   (15 min.)   [facility] LAB DRAW   [facility] Lab Draw     [facility] ONC INFUSION 360   12:00 PM   (360 min.)    ONCOLOGY INFUSION   [facility] ONC RETURN    3:30 PM   (30 min.)   Rafita Rogel MD   [facility] Blood and Marrow Transplant 3     4       5     Outpatient Visit    8:36 AM   [facility]     LEVEL 5    9:00 AM   (300 min.)   SOUTH [facility] 4   [facility] Cancer [facility]fusion Center 6     7     Outpatient Visit    8:15 AM   Manpreet Mcdaniel MD [facility]     LEVEL 5    9:00 AM   (300 min.)    SOUTH CHAIR 2   [facility] Infusion Center 8     9     10     [facility] DRAW    9:00 AM   (15 min.)   [facility] DRAW   M [facility] Lab Draw     [facility] RETURN    9:15 AM   (50 min.)   Celine Walls PA-C M [facility]     [facility] ONC INFUSION 360   10:30 AM   (360 min.)   [facility] ONCOLOGY INFUSION   [facility] 11       12     LEVEL 5    8:30 AM   (300 min.)   SOUTH CHAIR 5   [facility] and Infusion Center     Outpatient Visit    8:54 AM   Manpreet Mcdaniel MD   [facility] 13     14     Outpatient Visit    8:28 AM   Celine Walls PA-C   [facility]     LEVEL 5    9:00 AM   (300 min.)   NORTH CHAIR 11   [facility]fusion [facility] 15     16     Outpatient Visit    8:20 AM   [facility] 17     [facility] DRAW    9:30 AM   (15 min.)   [facility] DRAW   [facility] Lab Draw     [facility] RETURN   10:05 AM   (50 min.)   [facility] Wall[facility]C [facility] 360   10:30 AM   (360 min.)   [facility] ONCOLOGY INFUSION   [facility] 18       19     Outpatient Visit    8:25 AM   Celine Walls PA-C   [facility]     LEVEL 5    8:30 AM   (300 min.)   SOUTH CHAIR 3   [facility]fusion [facility] 20     21     Outpatient Visit    8:22 AM   [facility]     LEVEL 5    9:00 AM   (300 min.)   SOUTH CHAIR 8   [facility] 22     23     24     [facility] DRAW    9:30 AM   (15 min.)   [facility] DRAW   [facility] Lab Draw     [facility] RETURN   10:05 AM   (50 min.)   Celine Walls PA-C M [facility]     [facility] ONC INFUSION 360   11:00 AM   (360 min.)   [facility] ONCOLOGY INFUSION   [facility] 25       26     LEVEL 5    8:30 AM   (300 min.)   SOUTH CHAIR 5   [facility]fusion [facility] 27     28     LEVEL 5    8:30 AM    (300 min.)   [facility] 1   [facility] and Infusion Center                                March 2017 Sunday Monday Tuesday Wednesday Thursday Friday Saturday                  1     2     3     [facility] LAB DRAW    9:00 AM   (15 min.)    [facility] LAB DRAW   [facility] Lab Draw     [facility] RETURN    9:15 AM   (50 min.)   Celine Walls PA-C   [facility]   10:30 AM   (360 min.)   UC ONCOLOGY INFUSION   [facility] 4       5     6     7     8     9     [facility] ONC RETURN    3:00 PM   (30 min.)   Rafita Rogel MD   [facility] Blood and Marrow Transplant 10     11       12     13     14     15     16     17     18       19     20     21     22     23     24     25       26     27     28     29     30     31                      Recent Results (from the past 24 hour(s))   Platelets prepare order unit    Collection Time: 02/24/17  1:00 AM   Result Value Ref Range    Ordered Component Type PLT Pheresis     Units Ordered 1    Blood component    Collection Time: 02/24/17  1:00 AM   Result Value Ref Range    Unit Number [id number]     Blood Component Type PlateletPheresis LeukoReduced Irradiated     Division Number 00     Status of Unit Released to care unit 02/24/2017 1118     Blood Product Code [id number]     Unit Status ISS    CBC with platelets differential    Collection Time: 02/24/17 10:10 AM   Result Value Ref Range    WBC 3.0 (L) 4.0 - 11.0 10e9/L    RBC Count 2.60 (L) 3.8 - 5.2 10e12/L    Hemoglobin 7.9 (L) 11.7 - 15.7 g/dL    Hematocrit 22.6 (L) 35.0 - 47.0 %    MCV 87 78 - 100 fl    MCH 30.4 26.5 - 33.0 pg    MCHC 35.0 31.5 - 36.5 g/dL    RDW 18.5 (H) 10.0 - 15.0 %    Platelet Count 22 (LL) 150 - 450 10e9/L    Diff Method Manual Differential     % Neutrophils 38.0 %    % Lymphocytes 57.0 %    % Monocytes 5.0 %    % Eosinophils 0.0 %    % Basophils 0.0 %    Nucleated RBCs 6 (H) 0 /100    Absolute Neutrophil 1.1 (L) 1.6 -  8.3 10e9/L    Absolute Lymphocytes 1.7 0.8 - 5.3 10e9/L    Absolute Monocytes 0.2 0.0 - 1.3 10e9/L    Absolute Eosinophils 0.0 0.0 - 0.7 10e9/L    Absolute Basophils 0.0 0.0 - 0.2 10e9/L    Absolute Nucleated RBC 0.2     Anisocytosis Moderate     Poikilocytosis Slight     Target Cells Slight     Smudge Cells Present    Comprehensive metabolic panel    Collection Time: 02/24/17 10:10 AM   Result Value Ref Range    Sodium 138 133 - 144 mmol/L    Potassium 4.7 3.4 - 5.3 mmol/L    Chloride 102 94 - 109 mmol/L    Carbon Dioxide 24 20 - 32 mmol/L    Anion Gap 11 3 - 14 mmol/L    Glucose 104 (H) 70 - 99 mg/dL    Urea Nitrogen 51 (H) 7 - 30 mg/dL    Creatinine 1.39 (H) 0.52 - 1.04 mg/dL    GFR Estimate 37 (L) >60 mL/min/1.7m2    GFR Estimate If Black 45 (L) >60 mL/min/1.7m2    Calcium 8.6 8.5 - 10.1 mg/dL    Bilirubin Total 3.0 (H) 0.2 - 1.3 mg/dL    Albumin 3.0 (L) 3.4 - 5.0 g/dL    Protein Total 6.7 (L) 6.8 - 8.8 g/dL    Alkaline Phosphatase 79 40 - 150 U/L    ALT 49 0 - 50 U/L    AST 27 0 - 45 U/L   ABO/Rh type and screen    Collection Time: 02/24/17 10:11 AM   Result Value Ref Range    Units Ordered 2     ABO A     RH(D)  Pos     Antibody Screen Neg     Test Valid Only At       [facility]    Specimen Expires 02/27/2017     Crossmatch Red Blood Cells    Blood component    Collection Time: 02/24/17 10:11 AM   Result Value Ref Range    Unit Number [id number]     Blood Component Type Red Blood Cells LeukoReduced Irradiated     Division Number 00     Status of Unit      Blood Product Code [id number]     Unit Status     Blood component    Collection Time: 02/24/17 10:11 AM   Result Value Ref Range    Unit Number [id number]     Blood Component Type Red Blood Cells LeukoReduced Irradiated     Division Number 00     Status of Unit      Blood Product Code [id number]     Unit Status                 Follow-ups after your visit        Your next 10 appointments already scheduled     Feb  26, 2017  8:30 AM CST   Level 5 with SOUTH CHAIR 5   [facility]fusion [facility] ([facility])    [facility] [place]  [address]  [place] [address]   [phone]            Feb 28, 2017  8:30 AM CST   Level 5 with SOUTH CHAIR 1   [facility] ([facility])    [facility] [place]  [address]  [place] [address]   [phone]            Mar 03, 2017  9:00 AM CST   [facility] Draw with  [facility] LAB DRAW   [facility] Draw ([facility])    [address]  [address] [facility] [address]   [phone]            Mar 03, 2017  9:30 AM CST   (Arrive by 9:15 AM)   Return Visit with Celine Walls PA-C   [facility] ([facility])    [address]  [address] [facility] [address]   [phone]            Mar 03, 2017 10:30 AM CST   Infusion 360 with  ONCOLOGY INFUSION, UC 16 ATC   [facility] ([facility])    [address]  [address] [facility] [address]   [phone]            Mar 09, 2017  3:00 PM CST   RETURN ONC with Rafita Rogel MD   [facility] Blood and Marrow Transplant ([facility])    [address]  [address] [facility] [address]   [phone]              Future tests that were ordered for you today     Open Standing Orders        Priority Remaining Interval Expires Ordered    Transfuse red blood cell unit Routine 100/100 TRANSFUSE 1 UNIT  2/24/2017    Transfuse platelets unit Routine 99/100 TRANSFUSE 1 DOSE  2/24/2017    Red blood cell prepare order unit Routine 98/100 CONDITIONAL (SPECIFY) BLOOD  2/23/2017    Platelets prepare order unit Routine 99/100 CONDITIONAL (SPECIFY) BLOOD  2/23/2017            Who to contact     If you have questions or need follow up  "information about today's clinic visit or your schedule please contact [facility] CANCER [facility] directly at [phone].  Normal or non-critical lab and imaging results will be communicated to you by [employer], letter or phone within 4 business days after the clinic has received the results. If you do not hear from us within 7 days, please contact the clinic through [employer] or phone. If you have a critical or abnormal lab result, we will notify you by phone as soon as possible.  Submit refill requests through [facility] or call your pharmacy and they will forward the refill request to us. Please allow 3 business days for your refill to be completed.          Additional Information About Your Visit        [employer] Information     [facility] lets you send messages to your doctor, view your test results, renew your prescriptions, schedule appointments and more. To sign up, go to [email or web address] . Click on \"Log in\" on the left side of the screen, which will take you to the Welcome page. Then click on \"Sign up Now\" on the right side of the page.     You will be asked to enter the access code listed below, as well as some personal information. Please follow the directions to create your username and password.     Your access code is: [email or web address]  Expires: 3/15/2017 11:47 AM     Your access code will  in 90 days. If you need help or a new code, please call your [place] clinic or [phone].        Care EveryWhere ID     This is your Care EveryWhere ID. This could be used by other organizations to access your [place] medical records  [id number]        Your Vitals Were     Pulse Temperature Respirations Pulse Oximetry          68 97.6  F (36.4  C) (Oral) 16 92%         Blood Pressure from Last 3 Encounters:   17 137/82   17 125/81   17 130/72    Weight from Last 3 Encounters:   17 60.3 kg (133 lb)   17 60 kg (132 lb 3.2 oz)   17 60.3 kg (133 lb)              We Performed " the Following     ABO/Rh type and screen     Blood component     Blood component     Blood component     CBC with platelets differential     Comprehensive metabolic panel     Obtain affirmation of informed consent     Platelets prepare order unit     Red blood cell prepare order unit     Red blood cell prepare order unit     Transfuse platelets unit     Treatment Conditions          Today's Medication Changes          These changes are accurate as of: 2/24/17 12:02 PM.  If you have any questions, ask your nurse or doctor.               These medicines have changed or have updated prescriptions.        Dose/Directions    cycloSPORINE modified 100 MG capsule   Commonly known as:  GENERIC EQUIVALENT   This may have changed:    - how to take this  - additional instructions  - Another medication with the same name was removed. Continue taking this medication, and follow the directions you see here.   Used for:  Idiopathic aplastic anemia (H)   Changed by:  Celine Walls PA-C        Dose:  100 mg   Take 1 capsule (100 mg) by mouth 2 times daily Taking 100 mg bid. This is current 2/22/2017   Quantity:  120 capsule   Refills:  0       predniSONE 10 MG tablet   Commonly known as:  DELTASONE   This may have changed:  how much to take   Used for:  Pancytopenia (H), Idiopathic aplastic anemia (H)   Changed by:  Celine Walls PA-C        Dose:  30 mg   Take 3 tablets (30 mg) by mouth daily Or as directed   Quantity:  180 tablet   Refills:  3                Primary Care Provider Office Phone # Fax #    Rafita Spencer Rogel [phone]        [facility] [address]  [facility] [address]        Thank you!     Thank you for choosing [facility] CANCER CLINIC  for your care. Our goal is always to provide you with excellent care. Hearing back from our patients is one way we can continue to improve our services. Please take a few minutes to complete the written survey that you may  receive in the mail after your visit with us. Thank you!             Your Updated Medication List - Protect others around you: Learn how to safely use, store and throw away your medicines at www.disposemymeds.org.          This list is accurate as of: 2/24/17 12:02 PM.  Always use your most recent med list.                   Brand Name Dispense Instructions for use    BENADRYL ALLERGY 25 MG tablet   Generic drug:  diphenhydrAMINE     56 tablet    Take 1 tablet (25 mg) by mouth nightly as needed       chlorpheniramine 4 MG tablet    CHLOR-TRIMETON     Take 4 mg by mouth every 6 hours as needed. For head cold       COMPRESSION STOCKINGS     2 each    Wear compression stockings at 20-30 mmHg rating most time during the day to the affected leg (left leg) or both legs. Take them off at night.       cycloSPORINE modified 100 MG capsule    GENERIC EQUIVALENT    120 capsule    Take 1 capsule (100 mg) by mouth 2 times daily Taking 100 mg bid. This is current 2/22/2017       diclofenac 1 % Gel topical gel    VOLTAREN    100 g    Apply 2 g topically 2 times daily       eltrombopag 50 MG tablet    PROMACTA    180 tablet    Take 3 tablets (150 mg) by mouth daily Administer on an empty stomach, 1 hour before or 2 hours after a meal. Or as directed       fluconazole 200 MG tablet    DIFLUCAN    30 tablet    Take 1 tablet (200 mg) by mouth daily       * levofloxacin 500 MG tablet    LEVAQUIN    30 tablet    Take 1 tablet (500 mg) by mouth daily       * LEVAQUIN 250 MG tablet   Generic drug:  levofloxacin     30 tablet    Take 1 tablet (250 mg) by mouth daily       loratadine 10 MG tablet    CLARITIN     Take 10 mg by mouth daily as needed. For head cold       nystatin [id number] UNIT/ML suspension    MYCOSTATIN    280 mL    Take 5 mLs (500,000 Units) by mouth 4 times daily Swish and spit.       order for DME     1 Box    Equipment being ordered: knee high compression stockings- 18-20 mm       oxyCODONE 5 MG IR tablet    ROXICODONE     50 tablet    Take 1 tablet (5 mg) by mouth every 6 hours as needed for moderate to severe pain       pantoprazole 40 MG EC tablet    PROTONIX    30 tablet    Take 1 tablet (40 mg) by mouth every morning       polyethylene glycol Packet    MIRALAX/GLYCOLAX    14 packet    Take 17 g by mouth daily . If you start to have loose stools/diarrhea or multiple bowel movements, ok to hold this medication. Then resume if no bowel movement after 24-48hours.       predniSONE 10 MG tablet    DELTASONE    180 tablet    Take 3 tablets (30 mg) by mouth daily Or as directed       SENNA S 8.6-50 MG per tablet   Generic drug:  senna-docusate     100 tablet    Can take 1-2 tablets up to twice a day. Can start with 1-2 tablets daily. If no bowel movement within 24 hours, can take 1-2 tablets twice a day. HOLD if you develop diarrhea. Resume taking if no bowel movement in 24 hours.       TYLENOL PO      Take 325 mg by mouth every 6 hours as needed for mild pain or fever       * Notice:  This list has 2 medication(s) that are the same as other medications prescribed for you. Read the directions carefully, and ask your doctor or other care provider to review them with you.

## 2017-02-24 NOTE — PATIENT INSTRUCTIONS
Post Blood Products Discharge Instructions    You have just received a blood product.  During the next 48 hours, please be aware of the following symptoms of a blood product reaction.  If you should experience any of these symptoms call your physician.    -Temperature 100.0 or greater  -Shaking or chills  -Shortness of breath or wheezing  -Headache  -Persistent non-productive cough  -Hives  -Itching  -Extreme weakness  -Facial swelling or flushing  -Red urine    If you experience any of these symptoms, please call triage at [phone] (Monday through  Friday 8:00 AM-4:30 PM):     If after hours, weekends or holidays, please call:  [facility] page  at [phone] and ask for the doctor on call for Oncology/Hematology or Women's Health service   Or Emergency Department at [phone]             February 2017 Sunday Monday Tuesday Wednesday Thursday Friday Saturday                  1     2     [facility]   11:30 AM   (15 min.)    [facility]     [facility] ONC INFUSION 360   12:00 PM   (360 min.)    ONCOLOGY INFUSION   [facility]     [facility] ONC RETURN    3:30 PM   (30 min.)   Rafita Rogel MD   [facility] Blood and Marrow Transplant 3     4       5     Outpatient Visit    8:36 AM   Manpreet Mcdaniel MD   [facility] Lab     LEVEL 5    9:00 AM   (300 min.)   SOUTH CHAIR 4   [facility] 6     7     Outpatient Visit    8:15 AM   Manpreet Mcdaniel MD [facility]     LEVEL 5    9:00 AM   (300 min.)   SOUTH CHAIR 2   [facility] 8     9     10     [facility] DRAW    9:00 AM   (15 min.)    [facility] DRAW   [facility] Draw     [facility] RETURN    9:15 AM   (50 min.)   Celine Walls PA-C   [facility]     [facility] ONC INFUSION 360   10:30 AM   (360 min.)    ONCOLOGY INFUSION   [facility] 11       12     LEVEL 5    8:30  AM   (300 min.)   SOUTH CHAIR 5   [facility]     Outpatient Visit    8:54 AM   Manpreet Mcdaniel MD   [facility] 13     14     Outpatient Visit    8:28 AM   Celine Walls PA-C [facility]     LEVEL 5    9:00 AM   (300 min.)   NORTH CHAIR 11   [facility] 15     16     Outpatient Visit    8:20 AM   [facility] 17     [facility] DRAW    9:30 AM   (15 min.)   [facility]   [facility] Draw     [facility] RETURN   10:05 AM   (50 min.)   Celine Walls PA-C [facility] ONC INFUSION 360   10:30 AM   (360 min.)    ONCOLOGY INFUSION   [facility] 18       19     Outpatient Visit    8:25 AM   Celine Walls PA-C [facility]     LEVEL 5    8:30 AM   (300 min.)   SOUTH CHAIR 3   [facility] 20     21     Outpatient Visit    8:22 AM   [facility]     LEVEL 5    9:00 AM   (300 min.)   SOUTH CHAIR 8   [facility] 22     23     24     [facility] DRAW    9:30 AM   (15 min.)   [facility] D[facility]   [facility] Draw     [facility] RETURN   10:05 AM   (50 min.)   Celine Walls PA-C   [facility] ONC INFUSION 360   11:00 AM   (360 min.)   [facility] ONCOLOGY INFUSION   [facility] 25       26     LEVEL 5    8:30 AM   (300 min.)   SOUTH CHAIR 5   [facility] 27     28     LEVEL 5    8:30 AM   (300 min.)   SOUTH CHAIR 1   [facility]                                March 2017 Sunday Monday Tuesday Wednesday Thursday Friday Saturday                  1     2     3     [facility] D[facility]    9:00 AM   (15 min.)   [facility]     [facility] RETURN    9:15 AM   (50 min.)   Celine Walls PA-C [facility]  ONC INFUSION 360   10:30 AM   (360 min.)   UC ONCOLOGY INFUSION   [facility] 4       5     6     7     8     9     P ONC RETURN    3:00 PM   (30 min.)   Rafita Rogel MD   [facility] Blood and Marrow Transplant 10     11       12     13     14     15     16     17     18       19     20     21     22     23     24     25       26     27     28     29     30     31                      Recent Results (from the past 24 hour(s))   Platelets prepare order unit    Collection Time: 02/24/17  1:00 AM   Result Value Ref Range    Ordered Component Type PLT Pheresis     Units Ordered 1    Blood component    Collection Time: 02/24/17  1:00 AM   Result Value Ref Range    Unit Number [id number]     Blood Component Type PlateletPheresis LeukoReduced Irradiated     Division Number 00     Status of Unit Released to care unit 02/24/2017 1118     Blood Product Code S3101P53     Unit Status ISS    CBC with platelets differential    Collection Time: 02/24/17 10:10 AM   Result Value Ref Range    WBC 3.0 (L) 4.0 - 11.0 10e9/L    RBC Count 2.60 (L) 3.8 - 5.2 10e12/L    Hemoglobin 7.9 (L) 11.7 - 15.7 g/dL    Hematocrit 22.6 (L) 35.0 - 47.0 %    MCV 87 78 - 100 fl    MCH 30.4 26.5 - 33.0 pg    MCHC 35.0 31.5 - 36.5 g/dL    RDW 18.5 (H) 10.0 - 15.0 %    Platelet Count 22 (LL) 150 - 450 10e9/L    Diff Method Manual Differential     % Neutrophils 38.0 %    % Lymphocytes 57.0 %    % Monocytes 5.0 %    % Eosinophils 0.0 %    % Basophils 0.0 %    Nucleated RBCs 6 (H) 0 /100    Absolute Neutrophil 1.1 (L) 1.6 - 8.3 10e9/L    Absolute Lymphocytes 1.7 0.8 - 5.3 10e9/L    Absolute Monocytes 0.2 0.0 - 1.3 10e9/L    Absolute Eosinophils 0.0 0.0 - 0.7 10e9/L    Absolute Basophils 0.0 0.0 - 0.2 10e9/L    Absolute Nucleated RBC 0.2     Anisocytosis Moderate     Poikilocytosis Slight     Target Cells Slight     Smudge Cells Present    Comprehensive metabolic panel    Collection Time: 02/24/17 10:10 AM   Result Value Ref  Range    Sodium 138 133 - 144 mmol/L    Potassium 4.7 3.4 - 5.3 mmol/L    Chloride 102 94 - 109 mmol/L    Carbon Dioxide 24 20 - 32 mmol/L    Anion Gap 11 3 - 14 mmol/L    Glucose 104 (H) 70 - 99 mg/dL    Urea Nitrogen 51 (H) 7 - 30 mg/dL    Creatinine 1.39 (H) 0.52 - 1.04 mg/dL    GFR Estimate 37 (L) >60 mL/min/1.7m2    GFR Estimate If Black 45 (L) >60 mL/min/1.7m2    Calcium 8.6 8.5 - 10.1 mg/dL    Bilirubin Total 3.0 (H) 0.2 - 1.3 mg/dL    Albumin 3.0 (L) 3.4 - 5.0 g/dL    Protein Total 6.7 (L) 6.8 - 8.8 g/dL    Alkaline Phosphatase 79 40 - 150 U/L    ALT 49 0 - 50 U/L    AST 27 0 - 45 U/L   ABO/Rh type and screen    Collection Time: 02/24/17 10:11 AM   Result Value Ref Range    Units Ordered 2     ABO A     RH(D)  Pos     Antibody Screen Neg     Test Valid Only At       [facility]    Specimen Expires 02/27/2017     Crossmatch Red Blood Cells    Blood component    Collection Time: 02/24/17 10:11 AM   Result Value Ref Range    Unit Number [id number]     Blood Component Type Red Blood Cells LeukoReduced Irradiated     Division Number 00     Status of Unit      Blood Product Code E9987O95     Unit Status     Blood component    Collection Time: 02/24/17 10:11 AM   Result Value Ref Range    Unit Number [id number]     Blood Component Type Red Blood Cells LeukoReduced Irradiated     Division Number 00     Status of Unit      Blood Product Code Y9568T52     Unit Status

## 2017-02-24 NOTE — NURSING NOTE
"Bonny Raymundo is a 71 year old female who presents for:  Chief Complaint   Patient presents with     Blood Draw     Labs drawn by RN from PICC. VS taken.         Initial Vitals:  /81 (BP Location: Right arm, Patient Position: Chair, Cuff Size: Adult Regular)  Pulse 89  Temp 97.6  F (36.4  C) (Oral)  Resp 16  Wt 60.3 kg (133 lb)  SpO2 94%  BMI 23.56 kg/m2 Estimated body mass index is 23.56 kg/(m^2) as calculated from the following:    Height as of 1/23/17: 1.6 m (5' 3\").    Weight as of this encounter: 60.3 kg (133 lb).. Body surface area is 1.64 meters squared. BP completed using cuff size: regular  Mild Pain (3) No LMP recorded. Patient has had a hysterectomy. Allergies and medications reviewed.     Medications: Medication refills not needed today.  Pharmacy name entered into [facility]:    [facility], [place] - [address]  [facility], [place] - [address]    Comments:     6 minutes for nursing intake (face to face time)   Kayla Burger CMA        "

## 2017-02-24 NOTE — MR AVS SNAPSHOT
After Visit Summary   2/24/2017    Bonny Raymundo    MRN: [id number]           Patient Information     Date Of Birth          1945        Visit Information        Provider Department      2/24/2017 10:20 AM Celine Walls PA-C [facility]        Today's Diagnoses     Idiopathic aplastic anemia (H)        Pancytopenia (H)           Follow-ups after your visit        Your next 10 appointments already scheduled     Feb 28, 2017  8:30 AM CST   Level 5 with [facility] 1   [facility] ([facility])    [facility]  [address]  [facility] [address]   [phone]            Mar 03, 2017  9:00 AM CST   [facility] Draw with [facility] LAB DRAW   [facility] Lab Draw ([facility])    [address] [place] [address] [id number] [phone]            Mar 03, 2017  9:30 AM CST   (Arrive by 9:15 AM)   Return Visit with CLARIBEL [facility] ([facility])    [address] [place] [id number] [phone]            Mar 03, 2017 10:30 AM CST   Infusion 360 with  ONCOLOGY INFUSION, UC 16 ATC   [facility] ([facility])    [address] [place] [id number] [phone]            Mar 09, 2017  3:00 PM CST   RETURN ONC with Rafita Rogel MD   [facility] Blood and Marrow Transplant ([facility])    [address] [place] [address] [id number] [phone]              Who to contact     If you have questions or need follow up information about today's clinic visit or your schedule please contact [facility] directly at [phone].  Normal or non-critical lab and imaging results will be communicated to you by MyChart, letter or phone within 4  "business days after the clinic has received the results. If you do not hear from us within 7 days, please contact the clinic through [employer] or phone. If you have a critical or abnormal lab result, we will notify you by phone as soon as possible.  Submit refill requests through [employer] or call your pharmacy and they will forward the refill request to us. Please allow 3 business days for your refill to be completed.          Additional Information About Your Visit        [employer] Information     [employer] lets you send messages to your doctor, view your test results, renew your prescriptions, schedule appointments and more. To sign up, go to [email or web address] . Click on \"Log in\" on the left side of the screen, which will take you to the Welcome page. Then click on \"Sign up Now\" on the right side of the page.     You will be asked to enter the access code listed below, as well as some personal information. Please follow the directions to create your username and password.     Your access code is: [email or web address]  Expires: 3/15/2017 11:47 AM     Your access code will  in 90 days. If you need help or a new code, please call your [place] clinic or [phone].        Care EveryWhere ID     This is your Care EveryWhere ID. This could be used by other organizations to access your [place] medical records  [id number]        Your Vitals Were     Pulse Temperature Respirations Pulse Oximetry BMI (Body Mass Index)       89 97.6  F (36.4  C) (Oral) 16 94% 23.56 kg/m2        Blood Pressure from Last 3 Encounters:   17 120/76   17 141/80   17 125/81    Weight from Last 3 Encounters:   17 60.3 kg (133 lb)   17 60 kg (132 lb 3.2 oz)   17 60.3 kg (133 lb)              Today, you had the following     No orders found for display         Today's Medication Changes          These changes are accurate as of: 17 11:59 PM.  If you have any questions, ask your nurse or doctor.             "   These medicines have changed or have updated prescriptions.        Dose/Directions    cycloSPORINE modified 100 MG capsule   Commonly known as:  GENERIC EQUIVALENT   This may have changed:    - how to take this  - additional instructions  - Another medication with the same name was removed. Continue taking this medication, and follow the directions you see here.   Used for:  Idiopathic aplastic anemia (H)   Changed by:  Celine Walls PA-C        Dose:  100 mg   Take 1 capsule (100 mg) by mouth 2 times daily Taking 100 mg bid. This is current 2/22/2017   Quantity:  120 capsule   Refills:  0       predniSONE 10 MG tablet   Commonly known as:  DELTASONE   This may have changed:  how much to take   Used for:  Pancytopenia (H), Idiopathic aplastic anemia (H)   Changed by:  Celine Walls PA-C        Dose:  30 mg   Take 3 tablets (30 mg) by mouth daily Or as directed   Quantity:  180 tablet   Refills:  3                Primary Care Provider Office Phone # Fax #    Rafita Rogel [phone]        [facility] [address] [facility] [address]  [facility] [address]        Thank you!     Thank you for choosing [facility]  for your care. Our goal is always to provide you with excellent care. Hearing back from our patients is one way we can continue to improve our services. Please take a few minutes to complete the written survey that you may receive in the mail after your visit with us. Thank you!             Your Updated Medication List - Protect others around you: Learn how to safely use, store and throw away your medicines at www.disposemymeds.org.          This list is accurate as of: 2/24/17 11:59 PM.  Always use your most recent med list.                   Brand Name Dispense Instructions for use    BENADRYL ALLERGY 25 MG tablet   Generic drug:  diphenhydrAMINE     56 tablet    Take 1 tablet (25 mg) by mouth nightly as needed       chlorpheniramine 4 MG tablet     CHLOR-TRIMETON     Take 4 mg by mouth every 6 hours as needed. For head cold       COMPRESSION STOCKINGS     2 each    Wear compression stockings at 20-30 mmHg rating most time during the day to the affected leg (left leg) or both legs. Take them off at night.       cycloSPORINE modified 100 MG capsule    GENERIC EQUIVALENT    120 capsule    Take 1 capsule (100 mg) by mouth 2 times daily Taking 100 mg bid. This is current 2/22/2017       diclofenac 1 % Gel topical gel    VOLTAREN    100 g    Apply 2 g topically 2 times daily       eltrombopag 50 MG tablet    PROMACTA    180 tablet    Take 3 tablets (150 mg) by mouth daily Administer on an empty stomach, 1 hour before or 2 hours after a meal. Or as directed       fluconazole 200 MG tablet    DIFLUCAN    30 tablet    Take 1 tablet (200 mg) by mouth daily       * levofloxacin 500 MG tablet    LEVAQUIN    30 tablet    Take 1 tablet (500 mg) by mouth daily       * LEVAQUIN 250 MG tablet   Generic drug:  levofloxacin     30 tablet    Take 1 tablet (250 mg) by mouth daily       loratadine 10 MG tablet    CLARITIN     Take 10 mg by mouth daily as needed. For head cold       nystatin 988809 UNIT/ML suspension    MYCOSTATIN    280 mL    Take 5 mLs (500,000 Units) by mouth 4 times daily Swish and spit.       order for DME     1 Box    Equipment being ordered: knee high compression stockings- 18-20 mm       oxyCODONE 5 MG IR tablet    ROXICODONE    50 tablet    Take 1 tablet (5 mg) by mouth every 6 hours as needed for moderate to severe pain       pantoprazole 40 MG EC tablet    PROTONIX    30 tablet    Take 1 tablet (40 mg) by mouth every morning       polyethylene glycol Packet    MIRALAX/GLYCOLAX    14 packet    Take 17 g by mouth daily . If you start to have loose stools/diarrhea or multiple bowel movements, ok to hold this medication. Then resume if no bowel movement after 24-48hours.       predniSONE 10 MG tablet    DELTASONE    180 tablet    Take 3 tablets (30 mg) by  mouth daily Or as directed       SENNA S 8.6-50 MG per tablet   Generic drug:  senna-docusate     100 tablet    Can take 1-2 tablets up to twice a day. Can start with 1-2 tablets daily. If no bowel movement within 24 hours, can take 1-2 tablets twice a day. HOLD if you develop diarrhea. Resume taking if no bowel movement in 24 hours.       TYLENOL PO      Take 325 mg by mouth every 6 hours as needed for mild pain or fever       * Notice:  This list has 2 medication(s) that are the same as other medications prescribed for you. Read the directions carefully, and ask your doctor or other care provider to review them with you.

## 2017-02-26 ENCOUNTER — HOSPITAL ENCOUNTER (OUTPATIENT)
Facility: CLINIC | Age: 72
Setting detail: SPECIMEN
Discharge: HOME | End: 2017-02-26
Attending: PHYSICIAN ASSISTANT | Admitting: PHYSICIAN ASSISTANT
Payer: COMMERCIAL

## 2017-02-26 ENCOUNTER — INFUSION THERAPY VISIT (OUTPATIENT)
Dept: INFUSION THERAPY | Facility: CLINIC | Age: 72
End: 2017-02-26
Attending: PHYSICIAN ASSISTANT
Payer: COMMERCIAL

## 2017-02-26 VITALS
RESPIRATION RATE: 16 BRPM | TEMPERATURE: 97.7 F | DIASTOLIC BLOOD PRESSURE: 76 MMHG | SYSTOLIC BLOOD PRESSURE: 120 MMHG | HEART RATE: 82 BPM

## 2017-02-26 DIAGNOSIS — D61.3 IDIOPATHIC APLASTIC ANEMIA (H): Primary | ICD-10-CM

## 2017-02-26 LAB
ALBUMIN SERPL-MCNC: 3.3 G/DL (ref 3.4–5)
ALP SERPL-CCNC: 100 U/L (ref 40–150)
ALT SERPL W P-5'-P-CCNC: 66 U/L (ref 0–50)
ANION GAP SERPL CALCULATED.3IONS-SCNC: 9 MMOL/L (ref 3–14)
AST SERPL W P-5'-P-CCNC: 43 U/L (ref 0–45)
BASOPHILS # BLD AUTO: 0 10E9/L (ref 0–0.2)
BASOPHILS NFR BLD AUTO: 0 %
BILIRUB SERPL-MCNC: 2.9 MG/DL (ref 0.2–1.3)
BUN SERPL-MCNC: 38 MG/DL (ref 7–30)
CALCIUM SERPL-MCNC: 8.7 MG/DL (ref 8.5–10.1)
CHLORIDE SERPL-SCNC: 102 MMOL/L (ref 94–109)
CO2 SERPL-SCNC: 25 MMOL/L (ref 20–32)
CREAT SERPL-MCNC: 1.37 MG/DL (ref 0.52–1.04)
DIFFERENTIAL METHOD BLD: ABNORMAL
EOSINOPHIL # BLD AUTO: 0 10E9/L (ref 0–0.7)
EOSINOPHIL NFR BLD AUTO: 0 %
ERYTHROCYTE [DISTWIDTH] IN BLOOD BY AUTOMATED COUNT: 17.8 % (ref 10–15)
GFR SERPL CREATININE-BSD FRML MDRD: 38 ML/MIN/1.7M2
GLUCOSE SERPL-MCNC: 114 MG/DL (ref 70–99)
HCT VFR BLD AUTO: 26.9 % (ref 35–47)
HGB BLD-MCNC: 9.5 G/DL (ref 11.7–15.7)
LYMPHOCYTES # BLD AUTO: 2.3 10E9/L (ref 0.8–5.3)
LYMPHOCYTES NFR BLD AUTO: 65 %
MCH RBC QN AUTO: 30.6 PG (ref 26.5–33)
MCHC RBC AUTO-ENTMCNC: 35.3 G/DL (ref 31.5–36.5)
MCV RBC AUTO: 87 FL (ref 78–100)
MONOCYTES # BLD AUTO: 0.1 10E9/L (ref 0–1.3)
MONOCYTES NFR BLD AUTO: 2 %
NEUTROPHILS # BLD AUTO: 1.2 10E9/L (ref 1.6–8.3)
NEUTROPHILS NFR BLD AUTO: 33 %
NRBC # BLD AUTO: 0.4 10*3/UL
NRBC BLD AUTO-RTO: 10 /100
PLATELET # BLD AUTO: 58 10E9/L (ref 150–450)
POTASSIUM SERPL-SCNC: 5.1 MMOL/L (ref 3.4–5.3)
PROT SERPL-MCNC: 7.3 G/DL (ref 6.8–8.8)
RBC # BLD AUTO: 3.1 10E12/L (ref 3.8–5.2)
SODIUM SERPL-SCNC: 136 MMOL/L (ref 133–144)
WBC # BLD AUTO: 3.6 10E9/L (ref 4–11)

## 2017-02-26 PROCEDURE — 85025 COMPLETE CBC W/AUTO DIFF WBC: CPT | Performed by: PHYSICIAN ASSISTANT

## 2017-02-26 PROCEDURE — 36592 COLLECT BLOOD FROM PICC: CPT

## 2017-02-26 PROCEDURE — 80053 COMPREHEN METABOLIC PANEL: CPT | Performed by: PHYSICIAN ASSISTANT

## 2017-02-26 NOTE — MR AVS SNAPSHOT
After Visit Summary   2/26/2017    Bonny Raymundo    MRN: [id number]           Patient Information     Date Of Birth          1945        Visit Information        Provider Department      2/26/2017 8:30 AM SOUTH CHAIR 5 [facility]        Today's Diagnoses     Idiopathic aplastic anemia (H)    -  1       Follow-ups after your visit        Your next 10 appointments already scheduled     Feb 28, 2017  8:30 AM CST   Level 5 with SOUTH CHAIR 1   [facility] ([facility])    [facility]  [address]  [facility] [address]   [phone]            Mar 03, 2017  9:00 AM CST   [facility] Lab Draw with [facility] LAB DRAW   [facility] Lab Draw ([facility])    [address] [place] [id number] [phone]            Mar 03, 2017  9:30 AM CST   (Arrive by 9:15 AM)   Return Visit with Celine Walls PA-C   [facility] ([facility])    [address] [place] [id number] [phone]            Mar 03, 2017 10:30 AM CST   Infusion 360 with  ONCOLOGY INFUSION,  16 ATC   [facility] ([facility])    [address] [place] [id number] [phone]            Mar 09, 2017  3:00 PM CST   RETURN ONC with Rafita Rogel MD   [facility] Blood and Marrow Transplant ([facility])    [address] [place] [id number] [phone]              Who to contact     If you have questions or need follow up information about today's clinic visit or your schedule please contact [facility] directly at [phone].  Normal or non-critical lab and imaging results will be communicated to you by MyChart, letter or phone within 4  "business days after the clinic has received the results. If you do not hear from us within 7 days, please contact the clinic through [employer] or phone. If you have a critical or abnormal lab result, we will notify you by phone as soon as possible.  Submit refill requests through [employer] or call your pharmacy and they will forward the refill request to us. Please allow 3 business days for your refill to be completed.          Additional Information About Your Visit        [employer] Information     [employer] lets you send messages to your doctor, view your test results, renew your prescriptions, schedule appointments and more. To sign up, go to [email or web address] . Click on \"Log in\" on the left side of the screen, which will take you to the Welcome page. Then click on \"Sign up Now\" on the right side of the page.     You will be asked to enter the access code listed below, as well as some personal information. Please follow the directions to create your username and password.     Your access code is: [email or web address]  Expires: 3/15/2017 11:47 AM     Your access code will  in 90 days. If you need help or a new code, please call your [place] clinic or [phone].        Care EveryWhere ID     This is your Care EveryWhere ID. This could be used by other organizations to access your [place] medical records  [id number]        Your Vitals Were     Pulse Temperature Respirations             82 97.7  F (36.5  C) (Oral) 16          Blood Pressure from Last 3 Encounters:   17 120/76   17 141/80   17 125/81    Weight from Last 3 Encounters:   17 60.3 kg (133 lb)   17 60 kg (132 lb 3.2 oz)   17 60.3 kg (133 lb)              We Performed the Following     CBC with platelets differential     Comprehensive metabolic panel        Primary Care Provider Office Phone # Fax #    Rafita Rogel [phone]        PH[facility]ICIANS [address] " [address]        Thank you!     Thank you for choosing [facility] IN[facility]  for your care. Our goal is always to provide you with excellent care. Hearing back from our patients is one way we can continue to improve our services. Please take a few minutes to complete the written survey that you may receive in the mail after your visit with us. Thank you!             Your Updated Medication List - Protect others around you: Learn how to safely use, store and throw away your medicines at www.disposemymeds.org.          This list is accurate as of: 2/26/17 10:18 AM.  Always use your most recent med list.                   Brand Name Dispense Instructions for use    BENADRYL ALLERGY 25 MG tablet   Generic drug:  diphenhydrAMINE     56 tablet    Take 1 tablet (25 mg) by mouth nightly as needed       chlorpheniramine 4 MG tablet    CHLOR-TRIMETON     Take 4 mg by mouth every 6 hours as needed. For head cold       COMPRESSION STOCKINGS     2 each    Wear compression stockings at 20-30 mmHg rating most time during the day to the affected leg (left leg) or both legs. Take them off at night.       cycloSPORINE modified 100 MG capsule    GENERIC EQUIVALENT    120 capsule    Take 1 capsule (100 mg) by mouth 2 times daily Taking 100 mg bid. This is current 2/22/2017       diclofenac 1 % Gel topical gel    VOLTAREN    100 g    Apply 2 g topically 2 times daily       eltrombopag 50 MG tablet    PROMACTA    180 tablet    Take 3 tablets (150 mg) by mouth daily Administer on an empty stomach, 1 hour before or 2 hours after a meal. Or as directed       fluconazole 200 MG tablet    DIFLUCAN    30 tablet    Take 1 tablet (200 mg) by mouth daily       * levofloxacin 500 MG tablet    LEVAQUIN    30 tablet    Take 1 tablet (500 mg) by mouth daily       * LEVAQUIN 250 MG tablet   Generic drug:  levofloxacin     30 tablet    Take 1 tablet (250 mg) by mouth daily       loratadine 10 MG tablet    CLARITIN     Take 10 mg by  mouth daily as needed. For head cold       nystatin 264029 UNIT/ML suspension    MYCOSTATIN    280 mL    Take 5 mLs (500,000 Units) by mouth 4 times daily Swish and spit.       order for DME     1 Box    Equipment being ordered: knee high compression stockings- 18-20 mm       oxyCODONE 5 MG IR tablet    ROXICODONE    50 tablet    Take 1 tablet (5 mg) by mouth every 6 hours as needed for moderate to severe pain       pantoprazole 40 MG EC tablet    PROTONIX    30 tablet    Take 1 tablet (40 mg) by mouth every morning       polyethylene glycol Packet    MIRALAX/GLYCOLAX    14 packet    Take 17 g by mouth daily . If you start to have loose stools/diarrhea or multiple bowel movements, ok to hold this medication. Then resume if no bowel movement after 24-48hours.       predniSONE 10 MG tablet    DELTASONE    180 tablet    Take 3 tablets (30 mg) by mouth daily Or as directed       SENNA S 8.6-50 MG per tablet   Generic drug:  senna-docusate     100 tablet    Can take 1-2 tablets up to twice a day. Can start with 1-2 tablets daily. If no bowel movement within 24 hours, can take 1-2 tablets twice a day. HOLD if you develop diarrhea. Resume taking if no bowel movement in 24 hours.       TYLENOL PO      Take 325 mg by mouth every 6 hours as needed for mild pain or fever       * Notice:  This list has 2 medication(s) that are the same as other medications prescribed for you. Read the directions carefully, and ask your doctor or other care provider to review them with you.

## 2017-02-26 NOTE — PROGRESS NOTES
Infusion Nursing Note:  Bonny Raymundo presents today for possible transfusion.    Patient seen by provider today: No   present during visit today: Not Applicable.    Note: labs drawn today.    Intravenous Access:  PICC.    Treatment Conditions:  Blood transfusion consent signed 10/6/16.      Post Infusion Assessment:  Site patent and intact, free from redness, edema or discomfort.    Discharge Plan:   Copy of AVS reviewed with patient and/or family.  Patient will return Tuesday 2/28/17 for next appointment.  Patient discharged in stable condition accompanied by: self.  Departure Mode: Ambulatory.    Too Singh RN    Patient given copy of today's labs.

## 2017-02-28 ENCOUNTER — HOSPITAL ENCOUNTER (OUTPATIENT)
Facility: CLINIC | Age: 72
Setting detail: SPECIMEN
Discharge: HOME | End: 2017-02-28
Attending: PHYSICIAN ASSISTANT | Admitting: PHYSICIAN ASSISTANT
Payer: COMMERCIAL

## 2017-02-28 ENCOUNTER — INFUSION THERAPY VISIT (OUTPATIENT)
Dept: INFUSION THERAPY | Facility: CLINIC | Age: 72
End: 2017-02-28
Attending: PHYSICIAN ASSISTANT
Payer: COMMERCIAL

## 2017-02-28 VITALS — DIASTOLIC BLOOD PRESSURE: 79 MMHG | HEART RATE: 106 BPM | TEMPERATURE: 97.1 F | SYSTOLIC BLOOD PRESSURE: 124 MMHG

## 2017-02-28 DIAGNOSIS — D61.3 IDIOPATHIC APLASTIC ANEMIA (H): Primary | ICD-10-CM

## 2017-02-28 LAB
ALBUMIN SERPL-MCNC: 3.1 G/DL (ref 3.4–5)
ALP SERPL-CCNC: 121 U/L (ref 40–150)
ALT SERPL W P-5'-P-CCNC: 64 U/L (ref 0–50)
ANION GAP SERPL CALCULATED.3IONS-SCNC: 10 MMOL/L (ref 3–14)
AST SERPL W P-5'-P-CCNC: 33 U/L (ref 0–45)
BASOPHILS # BLD AUTO: 0 10E9/L (ref 0–0.2)
BASOPHILS NFR BLD AUTO: 0 %
BILIRUB SERPL-MCNC: 2.7 MG/DL (ref 0.2–1.3)
BUN SERPL-MCNC: 45 MG/DL (ref 7–30)
CALCIUM SERPL-MCNC: 8.6 MG/DL (ref 8.5–10.1)
CHLORIDE SERPL-SCNC: 103 MMOL/L (ref 94–109)
CO2 SERPL-SCNC: 24 MMOL/L (ref 20–32)
CREAT SERPL-MCNC: 1.35 MG/DL (ref 0.52–1.04)
CYCLOSPORINE BLD LC/MS/MS-MCNC: 269 UG/L (ref 50–400)
DIFFERENTIAL METHOD BLD: ABNORMAL
EOSINOPHIL # BLD AUTO: 0 10E9/L (ref 0–0.7)
EOSINOPHIL NFR BLD AUTO: 0 %
ERYTHROCYTE [DISTWIDTH] IN BLOOD BY AUTOMATED COUNT: 17.5 % (ref 10–15)
GFR SERPL CREATININE-BSD FRML MDRD: 38 ML/MIN/1.7M2
GLUCOSE SERPL-MCNC: 95 MG/DL (ref 70–99)
HCT VFR BLD AUTO: 26.6 % (ref 35–47)
HGB BLD-MCNC: 9.4 G/DL (ref 11.7–15.7)
LYMPHOCYTES # BLD AUTO: 2.2 10E9/L (ref 0.8–5.3)
LYMPHOCYTES NFR BLD AUTO: 64 %
MCH RBC QN AUTO: 30.5 PG (ref 26.5–33)
MCHC RBC AUTO-ENTMCNC: 35.3 G/DL (ref 31.5–36.5)
MCV RBC AUTO: 86 FL (ref 78–100)
MONOCYTES # BLD AUTO: 0.1 10E9/L (ref 0–1.3)
MONOCYTES NFR BLD AUTO: 2 %
NEUTROPHILS # BLD AUTO: 1.2 10E9/L (ref 1.6–8.3)
NEUTROPHILS NFR BLD AUTO: 34 %
NRBC # BLD AUTO: 0.2 10*3/UL
NRBC BLD AUTO-RTO: 7 /100
PLATELET # BLD AUTO: 32 10E9/L (ref 150–450)
POTASSIUM SERPL-SCNC: 4 MMOL/L (ref 3.4–5.3)
PROT SERPL-MCNC: 6.9 G/DL (ref 6.8–8.8)
RBC # BLD AUTO: 3.08 10E12/L (ref 3.8–5.2)
SODIUM SERPL-SCNC: 137 MMOL/L (ref 133–144)
TME LAST DOSE: NORMAL H
WBC # BLD AUTO: 3.4 10E9/L (ref 4–11)

## 2017-02-28 PROCEDURE — 80053 COMPREHEN METABOLIC PANEL: CPT | Performed by: PHYSICIAN ASSISTANT

## 2017-02-28 PROCEDURE — 80158 DRUG ASSAY CYCLOSPORINE: CPT | Performed by: PHYSICIAN ASSISTANT

## 2017-02-28 PROCEDURE — 36592 COLLECT BLOOD FROM PICC: CPT

## 2017-02-28 PROCEDURE — 85025 COMPLETE CBC W/AUTO DIFF WBC: CPT | Performed by: PHYSICIAN ASSISTANT

## 2017-02-28 ASSESSMENT — PAIN SCALES - GENERAL: PAINLEVEL: MILD PAIN (2)

## 2017-02-28 NOTE — MR AVS SNAPSHOT
After Visit Summary   2/28/2017    Bonny Raymundo    MRN: [id number]           Patient Information     Date Of Birth          1945        Visit Information        Provider Department      2/28/2017 8:30 AM SOUTH CHAIR 1 [facility]        Today's Diagnoses     Idiopathic aplastic anemia (H)    -  1       Follow-ups after your visit        Your next 10 appointments already scheduled     Mar 03, 2017  9:00 AM CST   [facility] Lab Draw with [facility] LAB DRAW   [facility] Lab Draw ([facility])    [address] [place] [id number]   [phone]            Mar 03, 2017  9:30 AM CST   (Arrive by 9:15 AM)   Return Visit with Celine Walls PA-C   [facility] ([facility])    [address] [place] [address]   [phone]            Mar 03, 2017 10:30 AM CST   Infusion 360 with  ONCOLOGY INFUSION,  16 ATC   [facility] ([facility])    [address] [place] [address]   [phone]            Mar 09, 2017  3:00 PM CST   RETURN ONC with Rafita Rogel MD   [facility] Blood and Marrow Transplant ([facility])    [address] [place] [address]   [phone]              Future tests that were ordered for you today     Open Standing Orders        Priority Remaining Interval Expires Ordered    Cyclosporine Routine 20/20 weekly 12/29/2017 2/28/2017            Who to contact     If you have questions or need follow up information about today's clinic visit or your schedule please contact [facility] directly at [phone].  Normal or non-critical lab and imaging results will be communicated to you by MyChart, letter or phone within 4 business days after the clinic has received the  "results. If you do not hear from us within 7 days, please contact the clinic through [employer] or phone. If you have a critical or abnormal lab result, we will notify you by phone as soon as possible.  Submit refill requests through [employer] or call your pharmacy and they will forward the refill request to us. Please allow 3 business days for your refill to be completed.          Additional Information About Your Visit        [employer] Information     [employer] lets you send messages to your doctor, view your test results, renew your prescriptions, schedule appointments and more. To sign up, go to [email or web address] . Click on \"Log in\" on the left side of the screen, which will take you to the Welcome page. Then click on \"Sign up Now\" on the right side of the page.     You will be asked to enter the access code listed below, as well as some personal information. Please follow the directions to create your username and password.     Your access code is: [email or web address]  Expires: 3/15/2017 11:47 AM     Your access code will  in 90 days. If you need help or a new code, please call your [place] clinic or [phone].        Care EveryWhere ID     This is your Care EveryWhere ID. This could be used by other organizations to access your [place] medical records  [id number]        Your Vitals Were     Pulse Temperature                106 97.1  F (36.2  C) (Oral)           Blood Pressure from Last 3 Encounters:   17 124/79   17 120/76   17 141/80    Weight from Last 3 Encounters:   17 60.3 kg (133 lb)   17 60 kg (132 lb 3.2 oz)   17 60.3 kg (133 lb)              We Performed the Following     CBC with platelets differential     Comprehensive metabolic panel     Cyclosporine     MD Instruction for Therapy Plan        Primary Care Provider Office Phone # Fax #    Rafita Rogel [phone]        PHYSICIANS [address] [address]      "   Thank you!     Thank you for choosing [facility]  for your care. Our goal is always to provide you with excellent care. Hearing back from our patients is one way we can continue to improve our services. Please take a few minutes to complete the written survey that you may receive in the mail after your visit with us. Thank you!             Your Updated Medication List - Protect others around you: Learn how to safely use, store and throw away your medicines at www.disposemymeds.org.          This list is accurate as of: 2/28/17  9:57 AM.  Always use your most recent med list.                   Brand Name Dispense Instructions for use    BENADRYL ALLERGY 25 MG tablet   Generic drug:  diphenhydrAMINE     56 tablet    Take 1 tablet (25 mg) by mouth nightly as needed       chlorpheniramine 4 MG tablet    CHLOR-TRIMETON     Take 4 mg by mouth every 6 hours as needed. For head cold       COMPRESSION STOCKINGS     2 each    Wear compression stockings at 20-30 mmHg rating most time during the day to the affected leg (left leg) or both legs. Take them off at night.       cycloSPORINE modified 100 MG capsule    GENERIC EQUIVALENT    120 capsule    Take 1 capsule (100 mg) by mouth 2 times daily Taking 100 mg bid. This is current 2/22/2017       diclofenac 1 % Gel topical gel    VOLTAREN    100 g    Apply 2 g topically 2 times daily       eltrombopag 50 MG tablet    PROMACTA    180 tablet    Take 3 tablets (150 mg) by mouth daily Administer on an empty stomach, 1 hour before or 2 hours after a meal. Or as directed       fluconazole 200 MG tablet    DIFLUCAN    30 tablet    Take 1 tablet (200 mg) by mouth daily       * levofloxacin 500 MG tablet    LEVAQUIN    30 tablet    Take 1 tablet (500 mg) by mouth daily       * LEVAQUIN 250 MG tablet   Generic drug:  levofloxacin     30 tablet    Take 1 tablet (250 mg) by mouth daily       loratadine 10 MG tablet    CLARITIN     Take 10 mg by mouth daily  as needed. For head cold       nystatin [id number] UNIT/ML suspension    MYCOSTATIN    280 mL    Take 5 mLs (500,000 Units) by mouth 4 times daily Swish and spit.       order for DME     1 Box    Equipment being ordered: knee high compression stockings- 18-20 mm       oxyCODONE 5 MG IR tablet    ROXICODONE    50 tablet    Take 1 tablet (5 mg) by mouth every 6 hours as needed for moderate to severe pain       pantoprazole 40 MG EC tablet    PROTONIX    30 tablet    Take 1 tablet (40 mg) by mouth every morning       polyethylene glycol Packet    MIRALAX/GLYCOLAX    14 packet    Take 17 g by mouth daily . If you start to have loose stools/diarrhea or multiple bowel movements, ok to hold this medication. Then resume if no bowel movement after 24-48hours.       predniSONE 10 MG tablet    DELTASONE    180 tablet    Take 3 tablets (30 mg) by mouth daily Or as directed       SENNA S 8.6-50 MG per tablet   Generic drug:  senna-docusate     100 tablet    Can take 1-2 tablets up to twice a day. Can start with 1-2 tablets daily. If no bowel movement within 24 hours, can take 1-2 tablets twice a day. HOLD if you develop diarrhea. Resume taking if no bowel movement in 24 hours.       TYLENOL PO      Take 325 mg by mouth every 6 hours as needed for mild pain or fever       * Notice:  This list has 2 medication(s) that are the same as other medications prescribed for you. Read the directions carefully, and ask your doctor or other care provider to review them with you.

## 2017-02-28 NOTE — PROGRESS NOTES
Infusion Nursing Note:  Bonny Raymundo presents today for labs and possible tx.    Patient seen by provider today: No   present during visit today: Not Applicable.    Note: N/A.    Intravenous Access:  Labs drawn without difficulty.  PICC.    Treatment Conditions:  Lab Results   Component Value Date    HGB 9.4 02/28/2017     Lab Results   Component Value Date    WBC 3.4 02/28/2017      Lab Results   Component Value Date    ANEU 1.2 02/26/2017     Lab Results   Component Value Date    PLT 32 02/28/2017      Results reviewed, labs did NOT meet treatment parameters: Plt 32 and Hgb 9.4.      Post Infusion Assessment:  Blood return noted pre and post infusion.  Site patent and intact, free from redness, edema or discomfort.  No evidence of extravasations.    Discharge Plan:   Patient and/or family verbalized understanding of discharge instructions and all questions answered.  Copy of AVS reviewed with patient and/or family.  Patient will return 3/4/17 for next appointment.  Patient discharged in stable condition accompanied by: self.  Departure Mode: Ambulatory.    Guerlnie Robles RN

## 2017-03-02 LAB
BLD PROD TYP BPU: NORMAL
NUM BPU REQUESTED: 1

## 2017-03-03 ENCOUNTER — ONCOLOGY VISIT (OUTPATIENT)
Dept: ONCOLOGY | Facility: CLINIC | Age: 72
End: 2017-03-03
Attending: INTERNAL MEDICINE
Payer: COMMERCIAL

## 2017-03-03 VITALS
HEART RATE: 99 BPM | TEMPERATURE: 97.5 F | RESPIRATION RATE: 20 BRPM | WEIGHT: 131.7 LBS | OXYGEN SATURATION: 94 % | SYSTOLIC BLOOD PRESSURE: 120 MMHG | BODY MASS INDEX: 23.33 KG/M2 | DIASTOLIC BLOOD PRESSURE: 73 MMHG

## 2017-03-03 VITALS
TEMPERATURE: 98.1 F | DIASTOLIC BLOOD PRESSURE: 72 MMHG | OXYGEN SATURATION: 95 % | HEART RATE: 76 BPM | SYSTOLIC BLOOD PRESSURE: 144 MMHG | RESPIRATION RATE: 16 BRPM

## 2017-03-03 VITALS
SYSTOLIC BLOOD PRESSURE: 120 MMHG | TEMPERATURE: 97.5 F | BODY MASS INDEX: 23.33 KG/M2 | RESPIRATION RATE: 20 BRPM | OXYGEN SATURATION: 99 % | DIASTOLIC BLOOD PRESSURE: 73 MMHG | WEIGHT: 131.7 LBS | HEART RATE: 99 BPM

## 2017-03-03 DIAGNOSIS — M79.18 MYOFASCIAL PAIN: ICD-10-CM

## 2017-03-03 DIAGNOSIS — D61.3 IDIOPATHIC APLASTIC ANEMIA (H): ICD-10-CM

## 2017-03-03 DIAGNOSIS — D61.818 PANCYTOPENIA (H): Primary | ICD-10-CM

## 2017-03-03 LAB
ABO + RH BLD: NORMAL
ABO + RH BLD: NORMAL
ALBUMIN SERPL-MCNC: 3.1 G/DL (ref 3.4–5)
ALP SERPL-CCNC: 114 U/L (ref 40–150)
ALT SERPL W P-5'-P-CCNC: 44 U/L (ref 0–50)
ANION GAP SERPL CALCULATED.3IONS-SCNC: 13 MMOL/L (ref 3–14)
AST SERPL W P-5'-P-CCNC: 23 U/L (ref 0–45)
BASOPHILS # BLD AUTO: 0 10E9/L (ref 0–0.2)
BASOPHILS NFR BLD AUTO: 0 %
BILIRUB SERPL-MCNC: 2.4 MG/DL (ref 0.2–1.3)
BLD GP AB SCN SERPL QL: NORMAL
BLD PROD TYP BPU: NORMAL
BLD UNIT ID BPU: 0
BLD UNIT ID BPU: 0
BLOOD BANK CMNT PATIENT-IMP: NORMAL
BLOOD PRODUCT CODE: NORMAL
BLOOD PRODUCT CODE: NORMAL
BPU ID: NORMAL
BPU ID: NORMAL
BUN SERPL-MCNC: 41 MG/DL (ref 7–30)
CALCIUM SERPL-MCNC: 8.6 MG/DL (ref 8.5–10.1)
CHLORIDE SERPL-SCNC: 104 MMOL/L (ref 94–109)
CO2 SERPL-SCNC: 23 MMOL/L (ref 20–32)
CREAT SERPL-MCNC: 1.23 MG/DL (ref 0.52–1.04)
DIFFERENTIAL METHOD BLD: ABNORMAL
EOSINOPHIL # BLD AUTO: 0 10E9/L (ref 0–0.7)
EOSINOPHIL NFR BLD AUTO: 0 %
ERYTHROCYTE [DISTWIDTH] IN BLOOD BY AUTOMATED COUNT: 18.7 % (ref 10–15)
GFR SERPL CREATININE-BSD FRML MDRD: 43 ML/MIN/1.7M2
GLUCOSE SERPL-MCNC: 104 MG/DL (ref 70–99)
HCT VFR BLD AUTO: 26.4 % (ref 35–47)
HGB BLD-MCNC: 9 G/DL (ref 11.7–15.7)
LYMPHOCYTES # BLD AUTO: 1.6 10E9/L (ref 0.8–5.3)
LYMPHOCYTES NFR BLD AUTO: 54 %
MCH RBC QN AUTO: 30.7 PG (ref 26.5–33)
MCHC RBC AUTO-ENTMCNC: 34.1 G/DL (ref 31.5–36.5)
MCV RBC AUTO: 90 FL (ref 78–100)
MONOCYTES # BLD AUTO: 0.2 10E9/L (ref 0–1.3)
MONOCYTES NFR BLD AUTO: 7 %
NEUTROPHILS # BLD AUTO: 1.2 10E9/L (ref 1.6–8.3)
NEUTROPHILS NFR BLD AUTO: 39 %
NRBC # BLD AUTO: 0.2 10*3/UL
NRBC BLD AUTO-RTO: 6 /100
NUM BPU REQUESTED: 1
PLATELET # BLD AUTO: 14 10E9/L (ref 150–450)
POTASSIUM SERPL-SCNC: 4.2 MMOL/L (ref 3.4–5.3)
PROT SERPL-MCNC: 6.9 G/DL (ref 6.8–8.8)
RBC # BLD AUTO: 2.93 10E12/L (ref 3.8–5.2)
RBC MORPH BLD: NORMAL
SODIUM SERPL-SCNC: 140 MMOL/L (ref 133–144)
SPECIMEN EXP DATE BLD: NORMAL
TRANSFUSION STATUS PATIENT QL: NORMAL
WBC # BLD AUTO: 3 10E9/L (ref 4–11)

## 2017-03-03 PROCEDURE — 80053 COMPREHEN METABOLIC PANEL: CPT | Performed by: PHYSICIAN ASSISTANT

## 2017-03-03 PROCEDURE — 36592 COLLECT BLOOD FROM PICC: CPT

## 2017-03-03 PROCEDURE — 86900 BLOOD TYPING SEROLOGIC ABO: CPT | Performed by: PHYSICIAN ASSISTANT

## 2017-03-03 PROCEDURE — 99212 OFFICE O/P EST SF 10 MIN: CPT | Mod: ZF,25

## 2017-03-03 PROCEDURE — 36430 TRANSFUSION BLD/BLD COMPNT: CPT

## 2017-03-03 PROCEDURE — P9037 PLATE PHERES LEUKOREDU IRRAD: HCPCS | Performed by: PHYSICIAN ASSISTANT

## 2017-03-03 PROCEDURE — 99214 OFFICE O/P EST MOD 30 MIN: CPT | Mod: ZP | Performed by: PHYSICIAN ASSISTANT

## 2017-03-03 PROCEDURE — 86901 BLOOD TYPING SEROLOGIC RH(D): CPT | Performed by: PHYSICIAN ASSISTANT

## 2017-03-03 PROCEDURE — 85025 COMPLETE CBC W/AUTO DIFF WBC: CPT | Performed by: PHYSICIAN ASSISTANT

## 2017-03-03 PROCEDURE — 86923 COMPATIBILITY TEST ELECTRIC: CPT | Performed by: PHYSICIAN ASSISTANT

## 2017-03-03 PROCEDURE — 86850 RBC ANTIBODY SCREEN: CPT | Performed by: PHYSICIAN ASSISTANT

## 2017-03-03 RX ORDER — CYCLOSPORINE 100 MG/1
100 CAPSULE, LIQUID FILLED ORAL 2 TIMES DAILY
Qty: 60 CAPSULE | Refills: 1 | Status: SHIPPED | OUTPATIENT
Start: 2017-03-03 | End: 2017-03-09

## 2017-03-03 ASSESSMENT — PAIN SCALES - GENERAL: PAINLEVEL: NO PAIN (0)

## 2017-03-03 NOTE — PROGRESS NOTES
Infusion Nursing Note:  Bonny Raymundo presents today for platelets one dose.    Patient seen by provider today: Yes: DOROTA Patricia, in infusion today   present during visit today: Not Applicable.    Note: N/A.    Intravenous Access:  PICC.    Treatment Conditions:  Lab Results   Component Value Date    HGB 9.0 03/03/2017     Lab Results   Component Value Date    WBC 3.0 03/03/2017      Lab Results   Component Value Date           Lab Results   Component Value Date    PLT 14 03/03/2017      Lab Results   Component Value Date     03/03/2017                   Lab Results   Component Value Date    POTASSIUM 4.2 03/03/2017           Lab Results   Component Value Date                  Lab Results   Component Value Date    CR 1.23 03/03/2017                   Lab Results   Component Value Date    LEO 8.6 03/03/2017                Lab Results   Component Value Date    BILITOTAL 2.4 03/03/2017           Lab Results   Component Value Date    ALBUMIN 3.1 03/03/2017                    Lab Results   Component Value Date    ALT 44 03/03/2017           Lab Results   Component Value Date    AST 23 03/03/2017     Results reviewed, labs MET treatment parameters for platelets today, ok to proceed with treatment.  Blood transfusion consent signed 10/6/16.      Post Infusion Assessment:  Patient tolerated infusion without incident.  Blood return noted pre and post infusion.  Site patent and intact, free from redness, edema or discomfort.  No evidence of extravasations.  Purple lumen of PICC flushed per protocol.    Discharge Plan:   Prescription refills given for cyclosporine and Voltaren gel.  Copy of AVS reviewed with patient and/or family.  Patient will return 3[facility]5 to [facility] for [facility] appointment.  Patient discharged in stable condition accompanied by: self.  Departure Mode: Ambulatory.    LAZARO PIERRE RN

## 2017-03-03 NOTE — PATIENT INSTRUCTIONS
Post Blood Products Discharge Instructions    You have just received a blood product.  During the next 48 hours, please be aware of the following symptoms of a blood product reaction.  If you should experience any of these symptoms call your physician.    -Temperature 100.0 or greater  -Shaking or chills  -Shortness of breath or wheezing  -Headache  -Persistent non-productive cough  -Hives  -Itching  -Extreme weakness  -Facial swelling or flushing  -Red urine    If you experience any of these symptoms, please call triage at [phone].     If after hours, weekends or holidays, please call:  [facility] page  at [phone] and ask for the doctor on call for Oncology/Hematology.          March 2017 Sunday Monday Tuesday Wednesday Thursday Friday Saturday                  1     2     3     [facility] LAB DRAW    9:00 AM   (15 min.)    [facility]     [facility] RETURN    9:15 AM   (50 min.)   Celine Walls PA-C   [facility] ONC INFUSION 360   10:30 AM   (360 min.)    ONCOLOGY INFUSION   [facility] 4       5     LEVEL 5    8:30 AM   (300 min.)    INFUSION CHAIR 14   [facility] 6     7     LEVEL 5    9:00 AM   (300 min.)    INFUSION CHAIR 16   [facility] 8     9     [facility] ONC RETURN    3:00 PM   (30 min.)   Rafita Rogel MD   [facility] Blood and Marrow Transplant 10     LEVEL 5    9:00 AM   (300 min.)    INFUSION CHAIR 16   [facility] 11       12     LEVEL 5    8:30 AM   (300 min.)    INFUSION CHAIR 19   [facility] 13     14     LEVEL 5    8:00 AM   (300 min.)    INFUSION CHAIR 13   [facility] 15     16     17     [facility] LAB DRAW    9:30 AM   (15 min.)    [facility]   [facility]     [facility] ONC INFUSION 360   10:00  AM   (360 min.)    [facility]   12:15 PM   (50 min.)   Celine Walls PA-C [facility] 18       19     20     21     22     23     24     [facility] LAB DRAW    8:30 AM   (15 min.)    [facility] LAB DRAW   [facility] Lab Draw     [facility] RETURN    9:15 AM   (50 min.)   Celine Walls PA-C M [facility] ONC INFUSION 360   10:30 AM   (360 min.)    ONCOLOGY INFUSION   [facility] 25       26     27     28     29     30     31 April 2017 Sunday Monday Tuesday Wednesday Thursday Friday Saturday                                 1       2     3     4     5     6     7     8       9     10     11     12     13     14     15       16     17     18     19     20     21     22       23     24     25     26     27     28     29       30                                               Recent Results (from the past 24 hour(s))   Platelets prepare order unit    Collection Time: 03/03/17  1:00 AM   Result Value Ref Range    Ordered Component Type PLT Pheresis     Units Ordered 1    Blood component    Collection Time: 03/03/17  1:00 AM   Result Value Ref Range    Unit Number [id number]     Blood Component Type PlateletPheresis,LeukoRed Irrad (Part 2)     Division Number 00     Status of Unit Released to care unit 03/03/2017 1019     Blood Product Code [id number]     Unit Status ISS    CBC with platelets differential    Collection Time: 03/03/17  9:35 AM   Result Value Ref Range    WBC 3.0 (L) 4.0 - 11.0 10e9/L    RBC Count 2.93 (L) 3.8 - 5.2 10e12/L    Hemoglobin 9.0 (L) 11.7 - 15.7 g/dL    Hematocrit 26.4 (L) 35.0 - 47.0 %    MCV 90 78 - 100 fl    MCH 30.7 26.5 - 33.0 pg    MCHC 34.1 31.5 - 36.5 g/dL    RDW 18.7 (H) 10.0 - 15.0 %    Platelet Count 14 (LL) 150 - 450 10e9/L    Diff Method Pending    Comprehensive metabolic panel    Collection Time: 03/03/17  9:35 AM   Result  Value Ref Range    Sodium 140 133 - 144 mmol/L    Potassium 4.2 3.4 - 5.3 mmol/L    Chloride 104 94 - 109 mmol/L    Carbon Dioxide 23 20 - 32 mmol/L    Anion Gap 13 3 - 14 mmol/L    Glucose 104 (H) 70 - 99 mg/dL    Urea Nitrogen 41 (H) 7 - 30 mg/dL    Creatinine 1.23 (H) 0.52 - 1.04 mg/dL    GFR Estimate 43 (L) >60 mL/min/1.7m2    GFR Estimate If Black 52 (L) >60 mL/min/1.7m2    Calcium 8.6 8.5 - 10.1 mg/dL    Bilirubin Total 2.4 (H) 0.2 - 1.3 mg/dL    Albumin 3.1 (L) 3.4 - 5.0 g/dL    Protein Total 6.9 6.8 - 8.8 g/dL    Alkaline Phosphatase 114 40 - 150 U/L    ALT 44 0 - 50 U/L    AST 23 0 - 45 U/L   ABO/Rh type and screen    Collection Time: 03/03/17  9:35 AM   Result Value Ref Range    Units Ordered 1     ABO A     RH(D)  Pos     Antibody Screen Neg     Test Valid Only At       [facility]    Specimen Expires 03/06/2017     Crossmatch Red Blood Cells    Blood component    Collection Time: 03/03/17  9:35 AM   Result Value Ref Range    Unit Number [id number]     Blood Component Type Red Blood Cells LeukoReduced Irradiated     Division Number 00     Status of Unit Ready for patient 03/03/2017 1055     Blood Product Code [id number]     Unit Status GENEVA

## 2017-03-03 NOTE — NURSING NOTE
"Bonny Raymundo is a 71 year old female who presents for:  Chief Complaint   Patient presents with     Blood Draw     Pt with hx of aplastic anemia labs collected via picc line.         Initial Vitals:  /73  Pulse 99  Temp 97.5  F (36.4  C) (Oral)  Resp 20  Wt 59.7 kg (131 lb 11.2 oz)  SpO2 99%  BMI 23.33 kg/m2 Estimated body mass index is 23.33 kg/(m^2) as calculated from the following:    Height as of 1/23/17: 1.6 m (5' 3\").    Weight as of this encounter: 59.7 kg (131 lb 11.2 oz).. Body surface area is 1.63 meters squared. BP completed using cuff size: BP was taken in LAB INTAKE  No Pain (0) No LMP recorded. Patient has had a hysterectomy. Allergies and medications reviewed.     Medications: MEDICATION REFILLS NEEDED TODAY.  Pharmacy name entered into [facility]:    [facility], [place] - [address]  [facility] [place], [place] - [address]    Comments: Cyclosporine and Diclofenac sodium topical gel     6 minutes for nursing intake (face to face time)   Pattie Camacho [facility]          "

## 2017-03-03 NOTE — MR AVS SNAPSHOT
After Visit Summary   3/3/2017    Bonny Raymundo    MRN: [id number]           Patient Information     Date Of Birth          1945        Visit Information        Provider Department      3/3/2017 10:30 AM  16 ATC;  ONCOLOGY INFUSION [facility]        Today's Diagnoses     Pancytopenia (H)    -  1    Idiopathic aplastic anemia (H)          Care Instructions    Post Blood Products Discharge Instructions    You have just received a blood product.  During the next 48 hours, please be aware of the following symptoms of a blood product reaction.  If you should experience any of these symptoms call your physician.    -Temperature 100.0 or greater  -Shaking or chills  -Shortness of breath or wheezing  -Headache  -Persistent non-productive cough  -Hives  -Itching  -Extreme weakness  -Facial swelling or flushing  -Red urine    If you experience any of these symptoms, please call triage at [phone].     If after hours, weekends or holidays, please call:  [facility] page  at [phone] and ask for the doctor on call for Oncology/Hematology.          March 2017 Sunday Monday Tuesday Wednesday Thursday Friday Saturday                  1     2     3     [facility] LAB DRAW    9:00 AM   (15 min.)   [facility] LAB DRAW   [facility] Lab Draw     [facility] RETURN    9:15 AM   (50 min.)   Celine Walls PA-C   [facility] ONC INFUSION 360   10:30 AM   (360 min.)    ONCOLOGY INFUSION   [facility] 4       5     LEVEL 5    8:30 AM   (300 min.)    INFUSION CHAIR 14   [facility] 6     7     LEVEL 5    9:00 AM   (300 min.)    INFUSION CHAIR 16   [facility] 8     9     P ONC RETURN    3:00 PM   (30 min.)   Rafita Rogel MD   [facility] Blood and Marrow Transplant 10     LEVEL 5    9:00 AM   (300 min.)    INFUSION CHAIR 16   [facility]  Clinic and Infusion Center 11       12     LEVEL 5    8:30 AM   (300 min.)    INFUSION CHAIR 19   [facility] 13     14     LEVEL 5    8:00 AM   (300 min.)    INFUSION CHAIR 13   [facility] 15     16     17     [facility] DRAW    9:30 AM   (15 min.)    [facility] LAB DRAW   [facility] Lab Draw     [facility] ONC INFUSION 360   10:00 AM   (360 min.)    ONCOLOGY INFUSION   [facility]     [facility] RETURN   12:15 PM   (50 min.)   Celine Walls PA-C   [facility] 18       19     20     21     22     23     24     [facility] LAB DRAW    8:30 AM   (15 min.)    [facility] LAB DRAW   [facility] Lab Draw     [facility] RETURN    9:15 AM   (50 min.)   Celine Walls PA-C   [facility] ONC INFUSION 360   10:30 AM   (360 min.)    ONCOLOGY INFUSION   [facility] 25       26     27     28     29     30     31 April 2017 Sunday Monday Tuesday Wednesday Thursday Friday Saturday                                 1       2     3     4     5     6     7     8       9     10     11     12     13     14     15       16     17     18     19     20     21     22       23     24     25     26     27     28     29       30                                               Recent Results (from the past 24 hour(s))   Platelets prepare order unit    Collection Time: 03/03/17  1:00 AM   Result Value Ref Range    Ordered Component Type PLT Pheresis     Units Ordered 1    Blood component    Collection Time: 03/03/17  1:00 AM   Result Value Ref Range    Unit Number [id number]     Blood Component Type PlateletPheresis,LeukoRed Irrad (Part 2)     Division Number 00     Status of Unit Released to care unit 03/03/2017 1019     Blood Product Code [id number]     Unit Status ISS    CBC with platelets differential    Collection Time: 03/03/17  9:35 AM   Result Value Ref Range     WBC 3.0 (L) 4.0 - 11.0 10e9/L    RBC Count 2.93 (L) 3.8 - 5.2 10e12/L    Hemoglobin 9.0 (L) 11.7 - 15.7 g/dL    Hematocrit 26.4 (L) 35.0 - 47.0 %    MCV 90 78 - 100 fl    MCH 30.7 26.5 - 33.0 pg    MCHC 34.1 31.5 - 36.5 g/dL    RDW 18.7 (H) 10.0 - 15.0 %    Platelet Count 14 (LL) 150 - 450 10e9/L    Diff Method Pending    Comprehensive metabolic panel    Collection Time: 03/03/17  9:35 AM   Result Value Ref Range    Sodium 140 133 - 144 mmol/L    Potassium 4.2 3.4 - 5.3 mmol/L    Chloride 104 94 - 109 mmol/L    Carbon Dioxide 23 20 - 32 mmol/L    Anion Gap 13 3 - 14 mmol/L    Glucose 104 (H) 70 - 99 mg/dL    Urea Nitrogen 41 (H) 7 - 30 mg/dL    Creatinine 1.23 (H) 0.52 - 1.04 mg/dL    GFR Estimate 43 (L) >60 mL/min/1.7m2    GFR Estimate If Black 52 (L) >60 mL/min/1.7m2    Calcium 8.6 8.5 - 10.1 mg/dL    Bilirubin Total 2.4 (H) 0.2 - 1.3 mg/dL    Albumin 3.1 (L) 3.4 - 5.0 g/dL    Protein Total 6.9 6.8 - 8.8 g/dL    Alkaline Phosphatase 114 40 - 150 U/L    ALT 44 0 - 50 U/L    AST 23 0 - 45 U/L   ABO/Rh type and screen    Collection Time: 03/03/17  9:35 AM   Result Value Ref Range    Units Ordered 1     ABO A     RH(D)  Pos     Antibody Screen Neg     Test Valid Only At       [facility]    Specimen Expires 03/06/2017     Crossmatch Red Blood Cells    Blood component    Collection Time: 03/03/17  9:35 AM   Result Value Ref Range    Unit Number [id number]     Blood Component Type Red Blood Cells LeukoReduced Irradiated     Division Number 00     Status of Unit Ready for patient 03/03/2017 1055     Blood Product Code [id number]     Unit Status GENEVA                Follow-ups after your visit        Your next 10 appointments already scheduled     Mar 05, 2017  8:30 AM CST   Level 5 with  INFUSION CHAIR 14   [facility] ([facility])    [facility]  [address]  [place] [address]    [phone]            Mar 07, 2017  9:00 AM CST   Level 5 with SH INFUSION CHAIR 16   [facility] ([facility])    [facility] [place]  [address]  [place] [address]   [phone]            Mar 09, 2017  3:00 PM CST   RETURN ONC with Rafita Rogel MD   [facility] Blood and Marrow Transplant ([facility])    [address]  [address] [employer] [id number] [phone]            Mar 10, 2017  9:00 AM CST   Level 5 with SH INFUSION CHAIR 16   [facility] ([facility])    [facility] [place]  [address]  [place] [address]   [phone]            Mar 12, 2017  8:30 AM CDT   Level 5 with SH INFUSION CHAIR 19   [facility] ([facility])    [facility] [place]  [address]  [place] [address]   [phone]            Mar 14, 2017  8:00 AM CDT   Level 5 with SH INFUSION CHAIR 13   [facility] ([facility])    [facility] [place]  [address]  [place] [address]   [phone]            Mar 17, 2017  9:30 AM CDT   [facility] Lab Draw with [facility] LAB DRAW   [facility] ([facility])    [address]  [address] [employer] [id number] [phone]            Mar 17, 2017 10:00 AM CDT   Infusion 360 with UC ONCOLOGY INFUSION, UC 12 ATC   [facility] ([facility])    [address]  [address] [employer] [id number] [phone]            Mar 17, 2017 12:30 PM CDT   (Arrive by 12:15 PM)   Return Visit with Celine Walls PA-C   [facility] ([facility])    [address]  [address] [employer] [id number] [phone]  "             Future tests that were ordered for you today     Open Standing Orders        Priority Remaining Interval Expires Ordered    Transfuse platelets unit Routine 99/100 TRANSFUSE 1 DOSE  3/3/2017    Red blood cell prepare order unit Routine 99/100 CONDITIONAL (SPECIFY) BLOOD  3/2/2017    Platelets prepare order unit Routine 99/100 CONDITIONAL (SPECIFY) BLOOD  3/2/2017            Who to contact     If you have questions or need follow up information about today's clinic visit or your schedule please contact [facility] directly at [phone].  Normal or non-critical lab and imaging results will be communicated to you by [employer], letter or phone within 4 business days after the clinic has received the results. If you do not hear from us within 7 days, please contact the clinic through [employer] or phone. If you have a critical or abnormal lab result, we will notify you by phone as soon as possible.  Submit refill requests through [employer] or call your pharmacy and they will forward the refill request to us. Please allow 3 business days for your refill to be completed.          Additional Information About Your Visit        [employer] Information     Whyd lets you send messages to your doctor, view your test results, renew your prescriptions, schedule appointments and more. To sign up, go to [email or web address] . Click on \"Log in\" on the left side of the screen, which will take you to the Welcome page. Then click on \"Sign up Now\" on the right side of the page.     You will be asked to enter the access code listed below, as well as some personal information. Please follow the directions to create your username and password.     Your access code is: [email or web address]  Expires: 3/15/2017 11:47 AM     Your access code will  in 90 days. If you need help or a new code, please call your [place] clinic or [phone].        Care EveryWhere ID     This is your Care EveryWhere ID. This could be " used by other organizations to access your [facility] medical records  [id number]        Your Vitals Were     Pulse Temperature Respirations Pulse Oximetry          77 98.2  F (36.8  C) (Tympanic) 20 97%         Blood Pressure from Last 3 Encounters:   03/03/17 132/82   03/03/17 120/73   03/03/17 120/73    Weight from Last 3 Encounters:   03/03/17 59.7 kg (131 lb 11.2 oz)   03/03/17 59.7 kg (131 lb 11.2 oz)   02/24/17 60.3 kg (133 lb)              We Performed the Following     ABO/Rh type and screen     Blood component     Blood component     CBC with platelets differential     Comprehensive metabolic panel     Platelets prepare order unit     Red blood cell prepare order unit     Transfuse platelets unit          Today's Medication Changes          These changes are accurate as of: 3/3/17 11:01 AM.  If you have any questions, ask your nurse or doctor.               These medicines have changed or have updated prescriptions.        Dose/Directions    cycloSPORINE modified 100 MG capsule   Commonly known as:  GENERIC EQUIVALENT   This may have changed:  additional instructions   Used for:  Idiopathic aplastic anemia (H)   Changed by:  Celine Walls PA-C        Dose:  100 mg   Take 1 capsule (100 mg) by mouth 2 times daily   Quantity:  60 capsule   Refills:  1            Where to get your medicines      These medications were sent to [facility] [place], [place] - [address]  [address] [place] [address]    Hours:  TRANSPLANT PHONE NUMBER [phone] Phone:  [phone]     cycloSPORINE modified 100 MG capsule    diclofenac 1 % Gel topical gel                Primary Care Provider Office Phone # Fax #    Rafita Rogel [phone]        [facility] [address]   [facility] [address]        Thank you!     Thank you for choosing [facility]  for your care. Our goal is always to provide you with  excellent care. Hearing back from our patients is one way we can continue to improve our services. Please take a few minutes to complete the written survey that you may receive in the mail after your visit with us. Thank you!             Your Updated Medication List - Protect others around you: Learn how to safely use, store and throw away your medicines at www.disposemymeds.org.          This list is accurate as of: 3/3/17 11:01 AM.  Always use your most recent med list.                   Brand Name Dispense Instructions for use    BENADRYL ALLERGY 25 MG tablet   Generic drug:  diphenhydrAMINE     56 tablet    Take 1 tablet (25 mg) by mouth nightly as needed       chlorpheniramine 4 MG tablet    CHLOR-TRIMETON     Take 4 mg by mouth every 6 hours as needed. For head cold       COMPRESSION STOCKINGS     2 each    Wear compression stockings at 20-30 mmHg rating most time during the day to the affected leg (left leg) or both legs. Take them off at night.       cycloSPORINE modified 100 MG capsule    GENERIC EQUIVALENT    60 capsule    Take 1 capsule (100 mg) by mouth 2 times daily       diclofenac 1 % Gel topical gel    VOLTAREN    100 g    Apply 2 g topically 2 times daily       eltrombopag 50 MG tablet    PROMACTA    180 tablet    Take 3 tablets (150 mg) by mouth daily Administer on an empty stomach, 1 hour before or 2 hours after a meal. Or as directed       fluconazole 200 MG tablet    DIFLUCAN    30 tablet    Take 1 tablet (200 mg) by mouth daily       LEVAQUIN 250 MG tablet   Generic drug:  levofloxacin     30 tablet    Take 1 tablet (250 mg) by mouth daily       loratadine 10 MG tablet    CLARITIN     Take 10 mg by mouth daily as needed. For head cold       nystatin 636392 UNIT/ML suspension    MYCOSTATIN    280 mL    Take 5 mLs (500,000 Units) by mouth 4 times daily Swish and spit.       order for DME     1 Box    Equipment being ordered: knee high compression stockings- 18-20 mm       oxyCODONE 5 MG IR tablet     ROXICODONE    50 tablet    Take 1 tablet (5 mg) by mouth every 6 hours as needed for moderate to severe pain       pantoprazole 40 MG EC tablet    PROTONIX    30 tablet    Take 1 tablet (40 mg) by mouth every morning       polyethylene glycol Packet    MIRALAX/GLYCOLAX    14 packet    Take 17 g by mouth daily . If you start to have loose stools/diarrhea or multiple bowel movements, ok to hold this medication. Then resume if no bowel movement after 24-48hours.       predniSONE 10 MG tablet    DELTASONE    180 tablet    Take 3 tablets (30 mg) by mouth daily Or as directed       SENNA S 8.6-50 MG per tablet   Generic drug:  senna-docusate     100 tablet    Can take 1-2 tablets up to twice a day. Can start with 1-2 tablets daily. If no bowel movement within 24 hours, can take 1-2 tablets twice a day. HOLD if you develop diarrhea. Resume taking if no bowel movement in 24 hours.       TYLENOL PO      Take 325 mg by mouth every 6 hours as needed for mild pain or fever

## 2017-03-03 NOTE — NURSING NOTE
Chief Complaint   Patient presents with     Blood Draw     Pt with hx of aplastic anemia labs collected via picc line.

## 2017-03-03 NOTE — MR AVS SNAPSHOT
After Visit Summary   3/3/2017    Bonny Raymundo    MRN: [id number]           Patient Information     Date Of Birth          1945        Visit Information        Provider Department      3/3/2017 9:30 AM Celine Walls PA-C [facility]        Today's Diagnoses     Idiopathic aplastic anemia (H)        Myofascial pain           Follow-ups after your visit        Your next 10 appointments already scheduled     Mar 09, 2017  3:00 PM CST   RETURN ONC with Rafita Rogel MD   [facility] Blood and Marrow Transplant ([facility])    [address] [facility] [address]  [address] [facility] [address]   [phone]            Mar 10, 2017  9:00 AM CST   Level 5 with  INFUSION CHAIR 16   [facility] ([facility])    [facility]  [address]  [place] [address]   [phone]            Mar 12, 2017  8:30 AM CDT   Level 5 with  INFUSION CHAIR 19   [facility] ([facility])    [facility]  [address]  [place] [address]   [phone]            Mar 14, 2017  8:00 AM CDT   Level 5 with  INFUSION CHAIR 13   [facility] ([facility])    [facility]  [address]  [place] [address]   [phone]            Mar 17, 2017  9:30 AM CDT   Masonic Lab Draw with UC MASONIC LAB DRAW   [facility] Lab Draw ([facility])    [address] [facility] [address] [address]  [address] [facility] [address]   [phone]            Mar 17, 2017 10:00 AM CDT   Infusion 360 with UC ONCOLOGY INFUSION, UC 12 ATC   [facility] ([facility])    [address] [facility] [address]  [address] [facility] [address]   [phone]            Mar 17, 2017 12:30 PM CDT   (Arrive by 12:15 PM)   Return  Visit with Celine Walls PA-C   [facility] ([facility])    [address]  2nd Floor  [facility] [id number] [phone]            Mar 19, 2017  8:00 AM CDT   Level 5 with SH INFUSION CHAIR 13   [facility] ([facility])    [facility]  [address]  [place] [address]   [phone]            Mar 21, 2017  9:30 AM CDT   Level 5 with  INFUSION CHAIR 7   [facility] ([facility])    [facility]  [address]  [facility] [address] [phone]              Future tests that were ordered for you today     Open Standing Orders        Priority Remaining Interval Expires Ordered    Platelets prepare order unit Routine 99/100 CONDITIONAL (SPECIFY) BLOOD  3/7/2017    Transfuse platelets unit Routine 99/100 TRANSFUSE 1 DOSE  3/7/2017            Who to contact     If you have questions or need follow up information about today's clinic visit or your schedule please contact [facility] directly at [phone].  Normal or non-critical lab and imaging results will be communicated to you by [employer], letter or phone within 4 business days after the clinic has received the results. If you do not hear from us within 7 days, please contact the clinic through [employer] or phone. If you have a critical or abnormal lab result, we will notify you by phone as soon as possible.  Submit refill requests through [employer] or call your pharmacy and they will forward the refill request to us. Please allow 3 business days for your refill to be completed.          Additional Information About Your Visit        [employer] Information     [employer] lets you send messages to your doctor, view your test results, renew your prescriptions, schedule appointments and more. To sign up, go to [email or web address] .  "Click on \"Log in\" on the left side of the screen, which will take you to the Welcome page. Then click on \"Sign up Now\" on the right side of the page.     You will be asked to enter the access code listed below, as well as some personal information. Please follow the directions to create your username and password.     Your access code is: [email or web address]  Expires: 3/15/2017 11:47 AM     Your access code will  in 90 days. If you need help or a new code, please call your [facility] or [phone].        Care EveryWhere ID     This is your Care EveryWhere ID. This could be used by other organizations to access your [facility] medical records  [id number]        Your Vitals Were     Pulse Temperature Respirations Pulse Oximetry BMI (Body Mass Index)       99 97.5  F (36.4  C) (Oral) 20 99% 23.33 kg/m2        Blood Pressure from Last 3 Encounters:   17 (!) 136/92   17 109/68   17 144/72    Weight from Last 3 Encounters:   17 59.7 kg (131 lb 11.2 oz)   17 59.7 kg (131 lb 11.2 oz)   17 60.3 kg (133 lb)              Today, you had the following     No orders found for display         Today's Medication Changes          These changes are accurate as of: 3/3/17 11:59 PM.  If you have any questions, ask your nurse or doctor.               These medicines have changed or have updated prescriptions.        Dose/Directions    cycloSPORINE modified 100 MG capsule   Commonly known as:  GENERIC EQUIVALENT   This may have changed:  additional instructions   Used for:  Idiopathic aplastic anemia (H)   Changed by:  Celine Walls PA-C        Dose:  100 mg   Take 1 capsule (100 mg) by mouth 2 times daily   Quantity:  60 capsule   Refills:  1            Where to get your medicines      These medications were sent to [facility] [place], [place] - [address] [place] [address] [address] [address]    Hours:  TRANSPLANT PHONE NUMBER " [phone] Phone:  [phone]     cycloSPORINE modified 100 MG capsule    diclofenac 1 % Gel topical gel                Primary Care Provider Office Phone # Fax #    Rafita Rogel [phone]        PHYSICIANS [address] [facility] [address]  [facility] [address]        Thank you!     Thank you for choosing [facility]  for your care. Our goal is always to provide you with excellent care. Hearing back from our patients is one way we can continue to improve our services. Please take a few minutes to complete the written survey that you may receive in the mail after your visit with us. Thank you!             Your Updated Medication List - Protect others around you: Learn how to safely use, store and throw away your medicines at www.disposemymeds.org.          This list is accurate as of: 3/3/17 11:59 PM.  Always use your most recent med list.                   Brand Name Dispense Instructions for use    BENADRYL ALLERGY 25 MG tablet   Generic drug:  diphenhydrAMINE     56 tablet    Take 1 tablet (25 mg) by mouth nightly as needed       chlorpheniramine 4 MG tablet    CHLOR-TRIMETON     Take 4 mg by mouth every 6 hours as needed. For head cold       COMPRESSION STOCKINGS     2 each    Wear compression stockings at 20-30 mmHg rating most time during the day to the affected leg (left leg) or both legs. Take them off at night.       cycloSPORINE modified 100 MG capsule    GENERIC EQUIVALENT    60 capsule    Take 1 capsule (100 mg) by mouth 2 times daily       diclofenac 1 % Gel topical gel    VOLTAREN    100 g    Apply 2 g topically 2 times daily       eltrombopag 50 MG tablet    PROMACTA    180 tablet    Take 3 tablets (150 mg) by mouth daily Administer on an empty stomach, 1 hour before or 2 hours after a meal. Or as directed       fluconazole 200 MG tablet    DIFLUCAN    30 tablet    Take 1 tablet (200 mg) by mouth daily       LEVAQUIN 250 MG tablet   Generic drug:   levofloxacin     30 tablet    Take 1 tablet (250 mg) by mouth daily       loratadine 10 MG tablet    CLARITIN     Take 10 mg by mouth daily as needed. For head cold       nystatin 843046 UNIT/ML suspension    MYCOSTATIN    280 mL    Take 5 mLs (500,000 Units) by mouth 4 times daily Swish and spit.       order for DME     1 Box    Equipment being ordered: knee high compression stockings- 18-20 mm       oxyCODONE 5 MG IR tablet    ROXICODONE    50 tablet    Take 1 tablet (5 mg) by mouth every 6 hours as needed for moderate to severe pain       pantoprazole 40 MG EC tablet    PROTONIX    30 tablet    Take 1 tablet (40 mg) by mouth every morning       polyethylene glycol Packet    MIRALAX/GLYCOLAX    14 packet    Take 17 g by mouth daily . If you start to have loose stools/diarrhea or multiple bowel movements, ok to hold this medication. Then resume if no bowel movement after 24-48hours.       predniSONE 10 MG tablet    DELTASONE    180 tablet    Take 3 tablets (30 mg) by mouth daily Or as directed       SENNA S 8.6-50 MG per tablet   Generic drug:  senna-docusate     100 tablet    Can take 1-2 tablets up to twice a day. Can start with 1-2 tablets daily. If no bowel movement within 24 hours, can take 1-2 tablets twice a day. HOLD if you develop diarrhea. Resume taking if no bowel movement in 24 hours.       TYLENOL PO      Take 325 mg by mouth every 6 hours as needed for mild pain or fever

## 2017-03-05 ENCOUNTER — HOSPITAL ENCOUNTER (OUTPATIENT)
Facility: CLINIC | Age: 72
Setting detail: SPECIMEN
Discharge: HOME | End: 2017-03-05
Attending: INTERNAL MEDICINE | Admitting: INTERNAL MEDICINE
Payer: COMMERCIAL

## 2017-03-05 ENCOUNTER — INFUSION THERAPY VISIT (OUTPATIENT)
Dept: INFUSION THERAPY | Facility: CLINIC | Age: 72
End: 2017-03-05
Attending: INTERNAL MEDICINE
Payer: COMMERCIAL

## 2017-03-05 VITALS
SYSTOLIC BLOOD PRESSURE: 109 MMHG | HEART RATE: 97 BPM | DIASTOLIC BLOOD PRESSURE: 68 MMHG | RESPIRATION RATE: 18 BRPM | TEMPERATURE: 97.7 F

## 2017-03-05 DIAGNOSIS — D61.818 PANCYTOPENIA (H): Primary | ICD-10-CM

## 2017-03-05 DIAGNOSIS — D61.3 IDIOPATHIC APLASTIC ANEMIA (H): ICD-10-CM

## 2017-03-05 LAB
BASOPHILS # BLD AUTO: 0 10E9/L (ref 0–0.2)
BASOPHILS NFR BLD AUTO: 0 %
DIFFERENTIAL METHOD BLD: ABNORMAL
EOSINOPHIL # BLD AUTO: 0 10E9/L (ref 0–0.7)
EOSINOPHIL NFR BLD AUTO: 0 %
ERYTHROCYTE [DISTWIDTH] IN BLOOD BY AUTOMATED COUNT: 18.9 % (ref 10–15)
HCT VFR BLD AUTO: 24.3 % (ref 35–47)
HGB BLD-MCNC: 8.5 G/DL (ref 11.7–15.7)
LYMPHOCYTES # BLD AUTO: 2.3 10E9/L (ref 0.8–5.3)
LYMPHOCYTES NFR BLD AUTO: 60 %
MACROCYTES BLD QL SMEAR: PRESENT
MCH RBC QN AUTO: 30.8 PG (ref 26.5–33)
MCHC RBC AUTO-ENTMCNC: 35 G/DL (ref 31.5–36.5)
MCV RBC AUTO: 88 FL (ref 78–100)
METAMYELOCYTES # BLD: 0 10E9/L
METAMYELOCYTES NFR BLD MANUAL: 1 %
MONOCYTES # BLD AUTO: 0.2 10E9/L (ref 0–1.3)
MONOCYTES NFR BLD AUTO: 5 %
NEUTROPHILS # BLD AUTO: 1.3 10E9/L (ref 1.6–8.3)
NEUTROPHILS NFR BLD AUTO: 34 %
NRBC # BLD AUTO: 0.1 10*3/UL
NRBC BLD AUTO-RTO: 3 /100
PLATELET # BLD AUTO: 51 10E9/L (ref 150–450)
RBC # BLD AUTO: 2.76 10E12/L (ref 3.8–5.2)
TARGETS BLD QL SMEAR: ABNORMAL
WBC # BLD AUTO: 3.8 10E9/L (ref 4–11)

## 2017-03-05 PROCEDURE — 85025 COMPLETE CBC W/AUTO DIFF WBC: CPT | Performed by: INTERNAL MEDICINE

## 2017-03-05 PROCEDURE — 36592 COLLECT BLOOD FROM PICC: CPT

## 2017-03-05 ASSESSMENT — PAIN SCALES - GENERAL: PAINLEVEL: MILD PAIN (2)

## 2017-03-05 NOTE — MR AVS SNAPSHOT
After Visit Summary   3/5/2017    Bonny Raymundo    MRN: [id number]           Patient Information     Date Of Birth          1945        Visit Information        Provider Department      3/5/2017 8:30 AM  INFUSION CHAIR 14 [facility]        Today's Diagnoses     Pancytopenia (H)    -  1    Idiopathic aplastic anemia (H)           Follow-ups after your visit        Your next 10 appointments already scheduled     Mar 07, 2017  9:00 AM CST   Level 5 with  INFUSION CHAIR 16   [facility] ([facility])    [facility] [place]  [address]  [place] [address]   [phone]            Mar 09, 2017  3:00 PM CST   RETURN ONC with Rafita Rogel MD   [facility] Blood and Marrow Transplant ([facility])    [address] [place] [id number] [phone]            Mar 10, 2017  9:00 AM CST   Level 5 with  INFUSION CHAIR 16   [facility] ([facility])    [facility]  [address]  [place] [address]   [phone]            Mar 12, 2017  8:30 AM CDT   Level 5 with  INFUSION CHAIR 19   [facility] ([facility])    [facility] [place]  [address]  [place] [address]   [phone]            Mar 14, 2017  8:00 AM CDT   Level 5 with  INFUSION CHAIR 13   [facility] ([facility])    [facility] [place]  [address]  [place] [address]   [phone]            Mar 17, 2017  9:30 AM CDT   Masonic Lab Draw with  MASONIC LAB DRAW   [facility]ic Lab Draw ([facility])    [address] [id number] [phone]            Mar 17, 2017 10:00 AM CDT  "  Infusion 360 with  ONCOLOGY INFUSION,  12 ATC   [facility] ([facility])    [address]  2nd Floor  [facility] [address]   [phone]            Mar 17, 2017 12:30 PM CDT   (Arrive by 12:15 PM)   Return Visit with Celine Walls PA-C   [facility] ([facility])    [address]  2nd Floor  [facility] [address]   [phone]            Mar 19, 2017  8:00 AM CDT   Level 5 with  INFUSION CHAIR 13   [facility] ([facility])    [facility]  [address]  [facility] [address] [phone]              Who to contact     If you have questions or need follow up information about today's clinic visit or your schedule please contact [facility] directly at [phone].  Normal or non-critical lab and imaging results will be communicated to you by [employer], letter or phone within 4 business days after the clinic has received the results. If you do not hear from us within 7 days, please contact the clinic through [employer] or phone. If you have a critical or abnormal lab result, we will notify you by phone as soon as possible.  Submit refill requests through [employer] or call your pharmacy and they will forward the refill request to us. Please allow 3 business days for your refill to be completed.          Additional Information About Your Visit        [employer] Information     [employer] lets you send messages to your doctor, view your test results, renew your prescriptions, schedule appointments and more. To sign up, go to [email or web address] . Click on \"Log in\" on the left side of the screen, which will take you to the Welcome page. Then click on \"Sign up Now\" on the right side of the page.     You will be asked to enter the access code listed below, as well as some personal information. " Please follow the directions to create your username and password.     Your access code is: [email or web address]  Expires: 3/15/2017 11:47 AM     Your access code will  in 90 days. If you need help or a new code, please call your [facility] clinic or [phone].        Care EveryWhere ID     This is your Care EveryWhere ID. This could be used by other organizations to access your [facility] medical records  [id number]        Your Vitals Were     Pulse Temperature Respirations             97 97.7  F (36.5  C) (Oral) 18          Blood Pressure from Last 3 Encounters:   17 109/68   17 144/72   17 120/73    Weight from Last 3 Encounters:   17 59.7 kg (131 lb 11.2 oz)   17 59.7 kg (131 lb 11.2 oz)   17 60.3 kg (133 lb)              We Performed the Following     CBC with platelets differential     MD Instruction for Therapy Plan        Primary Care Provider Office Phone # Fax #    Rafita Rogel [phone]        PHYSICIANS [address] [facility] [address]  [facility] [address]        Thank you!     Thank you for choosing [facility] CLINIC AND [facility]  for your care. Our goal is always to provide you with excellent care. Hearing back from our patients is one way we can continue to improve our services. Please take a few minutes to complete the written survey that you may receive in the mail after your visit with us. Thank you!             Your Updated Medication List - Protect others around you: Learn how to safely use, store and throw away your medicines at www.disposemymeds.org.          This list is accurate as of: 3/5/17  9:20 AM.  Always use your most recent med list.                   Brand Name Dispense Instructions for use    BENADRYL ALLERGY 25 MG tablet   Generic drug:  diphenhydrAMINE     56 tablet    Take 1 tablet (25 mg) by mouth nightly as needed       chlorpheniramine 4 MG tablet    CHLOR-TRIMETON     Take 4 mg by mouth every 6 hours as  needed. For head cold       COMPRESSION STOCKINGS     2 each    Wear compression stockings at 20-30 mmHg rating most time during the day to the affected leg (left leg) or both legs. Take them off at night.       cycloSPORINE modified 100 MG capsule    GENERIC EQUIVALENT    60 capsule    Take 1 capsule (100 mg) by mouth 2 times daily       diclofenac 1 % Gel topical gel    VOLTAREN    100 g    Apply 2 g topically 2 times daily       eltrombopag 50 MG tablet    PROMACTA    180 tablet    Take 3 tablets (150 mg) by mouth daily Administer on an empty stomach, 1 hour before or 2 hours after a meal. Or as directed       fluconazole 200 MG tablet    DIFLUCAN    30 tablet    Take 1 tablet (200 mg) by mouth daily       LEVAQUIN 250 MG tablet   Generic drug:  levofloxacin     30 tablet    Take 1 tablet (250 mg) by mouth daily       loratadine 10 MG tablet    CLARITIN     Take 10 mg by mouth daily as needed. For head cold       nystatin [id number] UNIT/ML suspension    MYCOSTATIN    280 mL    Take 5 mLs (500,000 Units) by mouth 4 times daily Swish and spit.       order for DME     1 Box    Equipment being ordered: knee high compression stockings- 18-20 mm       oxyCODONE 5 MG IR tablet    ROXICODONE    50 tablet    Take 1 tablet (5 mg) by mouth every 6 hours as needed for moderate to severe pain       pantoprazole 40 MG EC tablet    PROTONIX    30 tablet    Take 1 tablet (40 mg) by mouth every morning       polyethylene glycol Packet    MIRALAX/GLYCOLAX    14 packet    Take 17 g by mouth daily . If you start to have loose stools/diarrhea or multiple bowel movements, ok to hold this medication. Then resume if no bowel movement after 24-48hours.       predniSONE 10 MG tablet    DELTASONE    180 tablet    Take 3 tablets (30 mg) by mouth daily Or as directed       SENNA S 8.6-50 MG per tablet   Generic drug:  senna-docusate     100 tablet    Can take 1-2 tablets up to twice a day. Can start with 1-2 tablets daily. If no bowel movement  within 24 hours, can take 1-2 tablets twice a day. HOLD if you develop diarrhea. Resume taking if no bowel movement in 24 hours.       TYLENOL PO      Take 325 mg by mouth every 6 hours as needed for mild pain or fever

## 2017-03-05 NOTE — PROGRESS NOTES
Infusion Nursing Note:  Bonny Raymundo presents today for labs and possible PRBC and Platelets.    Patient seen by provider today: No   present during visit today: Not Applicable.    Note: N/A.    Intravenous Access:  Labs drawn without difficulty.  PICC.    Treatment Conditions:  Lab Results   Component Value Date    HGB 8.5 03/05/2017     Lab Results   Component Value Date    WBC 3.8 03/05/2017      Lab Results   Component Value Date    ANEU 1.2 03/03/2017     Lab Results   Component Value Date    PLT 51 03/05/2017      Results reviewed, labs did NOT meet treatment parameters: no need for transfusion today.      Post Infusion Assessment:  Patient tolerated infusion without incident.  Blood return noted pre and post infusion.  Site patent and intact, free from redness, edema or discomfort.  No evidence of extravasations.    Discharge Plan:   Patient declined prescription refills.  Discharge instructions reviewed with: Patient.  Patient and/or family verbalized understanding of discharge instructions and all questions answered.  Copy of AVS reviewed with patient and/or family.  Patient will return 3/7/17 for next appointment.  Patient discharged in stable condition accompanied by: self.  Departure Mode: Ambulatory.    Lory Buenrostro RN

## 2017-03-07 ENCOUNTER — HOSPITAL ENCOUNTER (OUTPATIENT)
Facility: CLINIC | Age: 72
Setting detail: SPECIMEN
Discharge: HOME | End: 2017-03-07
Attending: PHYSICIAN ASSISTANT | Admitting: PHYSICIAN ASSISTANT
Payer: COMMERCIAL

## 2017-03-07 ENCOUNTER — INFUSION THERAPY VISIT (OUTPATIENT)
Dept: INFUSION THERAPY | Facility: CLINIC | Age: 72
End: 2017-03-07
Attending: INTERNAL MEDICINE
Payer: COMMERCIAL

## 2017-03-07 VITALS
RESPIRATION RATE: 18 BRPM | TEMPERATURE: 98.6 F | HEART RATE: 79 BPM | DIASTOLIC BLOOD PRESSURE: 83 MMHG | SYSTOLIC BLOOD PRESSURE: 137 MMHG

## 2017-03-07 DIAGNOSIS — D61.3 IDIOPATHIC APLASTIC ANEMIA (H): Primary | ICD-10-CM

## 2017-03-07 DIAGNOSIS — D61.818 PANCYTOPENIA (H): ICD-10-CM

## 2017-03-07 DIAGNOSIS — E87.5 HYPERKALEMIA: ICD-10-CM

## 2017-03-07 LAB
ALBUMIN SERPL-MCNC: 3 G/DL (ref 3.4–5)
ALP SERPL-CCNC: 97 U/L (ref 40–150)
ALT SERPL W P-5'-P-CCNC: 35 U/L (ref 0–50)
ANION GAP SERPL CALCULATED.3IONS-SCNC: 9 MMOL/L (ref 3–14)
ANISOCYTOSIS BLD QL SMEAR: ABNORMAL
AST SERPL W P-5'-P-CCNC: 27 U/L (ref 0–45)
BASOPHILS # BLD AUTO: 0 10E9/L (ref 0–0.2)
BASOPHILS NFR BLD AUTO: 0 %
BILIRUB SERPL-MCNC: 2.5 MG/DL (ref 0.2–1.3)
BLD PROD TYP BPU: NORMAL
BLD PROD TYP BPU: NORMAL
BLD UNIT ID BPU: 0
BLOOD PRODUCT CODE: NORMAL
BPU ID: NORMAL
BUN SERPL-MCNC: 48 MG/DL (ref 7–30)
BURR CELLS BLD QL SMEAR: SLIGHT
CALCIUM SERPL-MCNC: 8.5 MG/DL (ref 8.5–10.1)
CHLORIDE SERPL-SCNC: 105 MMOL/L (ref 94–109)
CO2 SERPL-SCNC: 24 MMOL/L (ref 20–32)
CREAT SERPL-MCNC: 1.32 MG/DL (ref 0.52–1.04)
CYCLOSPORINE BLD LC/MS/MS-MCNC: 283 UG/L (ref 50–400)
DIFFERENTIAL METHOD BLD: ABNORMAL
EOSINOPHIL # BLD AUTO: 0 10E9/L (ref 0–0.7)
EOSINOPHIL NFR BLD AUTO: 0 %
ERYTHROCYTE [DISTWIDTH] IN BLOOD BY AUTOMATED COUNT: 19.7 % (ref 10–15)
GFR SERPL CREATININE-BSD FRML MDRD: 39 ML/MIN/1.7M2
GLUCOSE SERPL-MCNC: 108 MG/DL (ref 70–99)
HCT VFR BLD AUTO: 23.6 % (ref 35–47)
HGB BLD-MCNC: 8.2 G/DL (ref 11.7–15.7)
LYMPHOCYTES # BLD AUTO: 1.7 10E9/L (ref 0.8–5.3)
LYMPHOCYTES NFR BLD AUTO: 47 %
MCH RBC QN AUTO: 30.9 PG (ref 26.5–33)
MCHC RBC AUTO-ENTMCNC: 34.7 G/DL (ref 31.5–36.5)
MCV RBC AUTO: 89 FL (ref 78–100)
MONOCYTES # BLD AUTO: 0.5 10E9/L (ref 0–1.3)
MONOCYTES NFR BLD AUTO: 14 %
NEUTROPHILS # BLD AUTO: 1.4 10E9/L (ref 1.6–8.3)
NEUTROPHILS NFR BLD AUTO: 39 %
NRBC # BLD AUTO: 0.4 10*3/UL
NRBC BLD AUTO-RTO: 10 /100
NUM BPU REQUESTED: 1
PLATELET # BLD AUTO: 28 10E9/L (ref 150–450)
POTASSIUM SERPL-SCNC: 4 MMOL/L (ref 3.4–5.3)
POTASSIUM SERPL-SCNC: 5.7 MMOL/L (ref 3.4–5.3)
PROT SERPL-MCNC: 6.8 G/DL (ref 6.8–8.8)
RBC # BLD AUTO: 2.65 10E12/L (ref 3.8–5.2)
SODIUM SERPL-SCNC: 138 MMOL/L (ref 133–144)
TARGETS BLD QL SMEAR: ABNORMAL
TME LAST DOSE: NORMAL H
TRANSFUSION STATUS PATIENT QL: NORMAL
TRANSFUSION STATUS PATIENT QL: NORMAL
WBC # BLD AUTO: 3.7 10E9/L (ref 4–11)

## 2017-03-07 PROCEDURE — 80158 DRUG ASSAY CYCLOSPORINE: CPT | Performed by: PHYSICIAN ASSISTANT

## 2017-03-07 PROCEDURE — 36430 TRANSFUSION BLD/BLD COMPNT: CPT

## 2017-03-07 PROCEDURE — 85025 COMPLETE CBC W/AUTO DIFF WBC: CPT | Performed by: PHYSICIAN ASSISTANT

## 2017-03-07 PROCEDURE — P9037 PLATE PHERES LEUKOREDU IRRAD: HCPCS | Performed by: PHYSICIAN ASSISTANT

## 2017-03-07 PROCEDURE — 80053 COMPREHEN METABOLIC PANEL: CPT | Performed by: PHYSICIAN ASSISTANT

## 2017-03-07 PROCEDURE — 84132 ASSAY OF SERUM POTASSIUM: CPT | Performed by: PHYSICIAN ASSISTANT

## 2017-03-07 ASSESSMENT — PAIN SCALES - GENERAL: PAINLEVEL: NO PAIN (1)

## 2017-03-07 NOTE — PROGRESS NOTES
Infusion Nursing Note:  Bonny Raymundo presents today for labs, possible blood transfusion.    Patient seen by provider today: No   present during visit today: Not Applicable.    Note: Potassium level of 5.7 noted today. Called and spoke with DOROTA Patricia at 1112, orders as follows:    Recheck potassium via peripheral draw  Change standing orders to reflect CMP to be done once weekly on Tuesday when cyclosporine is drawn    Standing orders updated. Potassium recheck came back at 4.0 Sejal Walls paged with results as FYI.     Intravenous Access:  Labs drawn without difficulty.  PICC.    Treatment Conditions:  Lab Results   Component Value Date    HGB 8.2 03/07/2017     Lab Results   Component Value Date    WBC 3.7 03/07/2017      Lab Results   Component Value Date    ANEU 1.4 03/07/2017     Lab Results   Component Value Date    PLT 28 03/07/2017      Lab Results   Component Value Date     03/07/2017                   Lab Results   Component Value Date    POTASSIUM 5.7 03/07/2017             Lab Results   Component Value Date    CR 1.32 03/07/2017                   Lab Results   Component Value Date    LEO 8.5 03/07/2017                Lab Results   Component Value Date    BILITOTAL 2.5 03/07/2017           Lab Results   Component Value Date    ALBUMIN 3.0 03/07/2017                    Lab Results   Component Value Date    ALT 35 03/07/2017           Lab Results   Component Value Date    AST 27 03/07/2017     Results reviewed, labs MET treatment parameters for 1 dose platelets, ok to proceed with treatment. Did not meet parameters for PRBCs.  Blood transfusion consent signed: 1/9/17    Post Infusion Assessment:  Patient tolerated infusion without incident.  Blood return noted pre and post infusion.  Site patent and intact, free from redness, edema or discomfort.  No evidence of extravasations.    Discharge Plan:   Discharge instructions reviewed with: Patient.  Patient and/or family  verbalized understanding of discharge instructions and all questions answered.  Copy of AVS reviewed with patient and/or family.  Patient will return 3/10/17 for next appointment.  Patient discharged in stable condition accompanied by: self.  Departure Mode: Ambulatory.  Nursing face to face time: 15 minutes    Demetrice Connolly RN

## 2017-03-07 NOTE — MR AVS SNAPSHOT
After Visit Summary   3/7/2017    Bonny Raymundo    MRN: [id number]           Patient Information     Date Of Birth          1945        Visit Information        Provider Department      3/7/2017 9:00 AM  INFUSION CHAIR 16 [facility]        Today's Diagnoses     Idiopathic aplastic anemia (H)    -  1    Pancytopenia (H)        Hyperkalemia           Follow-ups after your visit        Your next 10 appointments already scheduled     Mar 09, 2017  3:00 PM CST   RETURN ONC with Rafita Rogel MD   [facility] ([facility])    [address]  [address] [facility] [address]   [phone]            Mar 10, 2017  9:00 AM CST   Level 5 with  INFUSION CHAIR 16   [facility] ([facility])    [facility]  [address]  [place] [address]   [phone]            Mar 12, 2017  8:30 AM CDT   Level 5 with  INFUSION CHAIR 19   [facility] ([facility])    [facility] [place]  [address]  [place] [address]   [phone]            Mar 14, 2017  8:00 AM CDT   Level 5 with  INFUSION CHAIR 13   [facility] ([facility])    [facility] [place]  [address]  [place] [address]   [phone]            Mar 17, 2017  9:30 AM CDT   [facility] Draw with [facility] DRAW   [facility] ([facility])    [address]  [address] [facility] [address]   [phone]            Mar 17, 2017 10:00 AM CDT   Infusion 360 with UC ONCOLOGY INFUSION, UC 12 ATC   [facility] ([facility])    [address]  [address] [facility] [address]   [phone]            Mar 17, 2017 12:30 PM  CDT   (Arrive by 12:15 PM)   Return Visit with Celine Walls PA-C   [facility] ([facility])    [address]  [address]nd [address]  [facility] [address]   [phone]            Mar 19, 2017  8:00 AM CDT   Level 5 with SH INFUSION CHAIR 13   [facility] ([facility])    [facility]  [address]  [facility] [address] [phone]            Mar 21, 2017  9:30 AM CDT   Level 5 with SH INFUSION CHAIR 7   [facility] ([facility])    [facility]  [address]  [facility] [address] [phone]              Future tests that were ordered for you today     Open Standing Orders        Priority Remaining Interval Expires Ordered    Comprehensive metabolic panel Routine 52/52 weekly on Tuesdays with cyclosporine level 3/7/2018 3/7/2017    Platelets prepare order unit Routine 99/100 CONDITIONAL (SPECIFY) BLOOD  3/7/2017    Transfuse platelets unit Routine 99/100 TRANSFUSE 1 DOSE  3/7/2017            Who to contact     If you have questions or need follow up information about today's clinic visit or your schedule please contact [facility] [place] directly at [phone].  Normal or non-critical lab and imaging results will be communicated to you by [employer], letter or phone within 4 business days after the clinic has received the results. If you do not hear from us within 7 days, please contact the clinic through [employer] or phone. If you have a critical or abnormal lab result, we will notify you by phone as soon as possible.  Submit refill requests through [employer] or call your pharmacy and they will forward the refill request to us. Please allow 3 business days for your refill to be completed.          Additional Information About Your Visit        [employer] Information     [employer] lets you  "send messages to your doctor, view your test results, renew your prescriptions, schedule appointments and more. To sign up, go to [email or web address] . Click on \"Log in\" on the left side of the screen, which will take you to the Welcome page. Then click on \"Sign up Now\" on the right side of the page.     You will be asked to enter the access code listed below, as well as some personal information. Please follow the directions to create your username and password.     Your access code is: [email or web address]  Expires: 3/15/2017 11:47 AM     Your access code will  in 90 days. If you need help or a new code, please call your [facility] clinic or [phone].        Care EveryWhere ID     This is your [facility] EveryWhere ID. This could be used by other organizations to access your [facility] medical records  [id number]        Your Vitals Were     Pulse Temperature Respirations             79 98.6  F (37  C) (Oral) 18          Blood Pressure from Last 3 Encounters:   17 137/83   17 109/68   17 144/72    Weight from Last 3 Encounters:   17 59.7 kg (131 lb 11.2 oz)   17 59.7 kg (131 lb 11.2 oz)   17 60.3 kg (133 lb)              We Performed the Following     Blood component     CBC with platelets differential     Comprehensive metabolic panel     Cyclosporine     Platelets prepare order unit     Potassium     Transfuse platelets unit        Primary Care Provider Office Phone # Fax #    Rafita Rogel [phone]        [facility] [address] [facility] [address]  [facility] [address]        Thank you!     Thank you for choosing [facility]  for your care. Our goal is always to provide you with excellent care. Hearing back from our patients is one way we can continue to improve our services. Please take a few minutes to complete the written survey that you may receive in the mail after your visit with us. Thank you!             Your " Updated Medication List - Protect others around you: Learn how to safely use, store and throw away your medicines at www.disposemymeds.org.          This list is accurate as of: 3/7/17  1:45 PM.  Always use your most recent med list.                   Brand Name Dispense Instructions for use    BENADRYL ALLERGY 25 MG tablet   Generic drug:  diphenhydrAMINE     56 tablet    Take 1 tablet (25 mg) by mouth nightly as needed       chlorpheniramine 4 MG tablet    CHLOR-TRIMETON     Take 4 mg by mouth every 6 hours as needed. For head cold       COMPRESSION STOCKINGS     2 each    Wear compression stockings at 20-30 mmHg rating most time during the day to the affected leg (left leg) or both legs. Take them off at night.       cycloSPORINE modified 100 MG capsule    GENERIC EQUIVALENT    60 capsule    Take 1 capsule (100 mg) by mouth 2 times daily       diclofenac 1 % Gel topical gel    VOLTAREN    100 g    Apply 2 g topically 2 times daily       eltrombopag 50 MG tablet    PROMACTA    180 tablet    Take 3 tablets (150 mg) by mouth daily Administer on an empty stomach, 1 hour before or 2 hours after a meal. Or as directed       fluconazole 200 MG tablet    DIFLUCAN    30 tablet    Take 1 tablet (200 mg) by mouth daily       LEVAQUIN 250 MG tablet   Generic drug:  levofloxacin     30 tablet    Take 1 tablet (250 mg) by mouth daily       loratadine 10 MG tablet    CLARITIN     Take 10 mg by mouth daily as needed. For head cold       nystatin 086640 UNIT/ML suspension    MYCOSTATIN    280 mL    Take 5 mLs (500,000 Units) by mouth 4 times daily Swish and spit.       order for DME     1 Box    Equipment being ordered: knee high compression stockings- 18-20 mm       oxyCODONE 5 MG IR tablet    ROXICODONE    50 tablet    Take 1 tablet (5 mg) by mouth every 6 hours as needed for moderate to severe pain       pantoprazole 40 MG EC tablet    PROTONIX    30 tablet    Take 1 tablet (40 mg) by mouth every morning       polyethylene  glycol Packet    MIRALAX/GLYCOLAX    14 packet    Take 17 g by mouth daily . If you start to have loose stools/diarrhea or multiple bowel movements, ok to hold this medication. Then resume if no bowel movement after 24-48hours.       predniSONE 10 MG tablet    DELTASONE    180 tablet    Take 3 tablets (30 mg) by mouth daily Or as directed       SENNA S 8.6-50 MG per tablet   Generic drug:  senna-docusate     100 tablet    Can take 1-2 tablets up to twice a day. Can start with 1-2 tablets daily. If no bowel movement within 24 hours, can take 1-2 tablets twice a day. HOLD if you develop diarrhea. Resume taking if no bowel movement in 24 hours.       TYLENOL PO      Take 325 mg by mouth every 6 hours as needed for mild pain or fever

## 2017-03-07 NOTE — PROGRESS NOTES
[facility]  FOLLOW-UP VISIT NOTE  Date of visit:   3/3/2017        REASON FOR VISIT: Aplastic anemia, weekly follow up    HPI: Bonny is a 71 year old female who presented in the fall of 2016 with fatigue and shortness of breath. 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  Her counts showed pancytopenia and BMBX was concerning for aplastic anemia.  By December of 2016 she was transfusion dependent with platelets under 10 k and ANC dropped under 500. 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  She has met with Dr Mcdaniel at [facility] and consulted with Dr Rogel at [facility].      After further consultation it was decided to admit on 1/9/17 for ATG/cyclosporine/prednisone therapy.     The following was her inpatient regimen:  Day 1= 1/9/17  ATG 2500 mg (40 mg/kg0 q24 hours D-4)  Cyclosporine 200 mg (3 mg/kg) PO bid  Eltrombopag 50 mg daily  Methylpred 31 mg (0.5 mg/kg) q24 hours D1-4  Prednisone 60 mg (1 mg/kg) daily after completion of IV methylpred to continue for at least 2 weeks    Bonny also had a admission 1/23-1/25/17 for rectal bleeding (presumed hemorrhoidal).  See DC summary for details.     INTERVAL HISTORY: Bonny is here today for her weekly follow up. Bonny feels that she is feeling the same as she has been. She has had OCASIO for the last few weeks. She continues to have a dry mouth and reports she is drinking about 1 liter of fluids a day. She continues to eat good for meals and family/friends continue to bring her food. Her low back pain is the same as last week. Bonny is using heat, tylenol (2-3 times a day) and oxycodone (0-2 times a day) which she feels is manageable. No fevers, chest pain and and no bleeding. Daily BM, no black/blood. No  issues.     ROS: complete review of systems was performed which was negative unless noted above.    EXAM:  /73  Pulse 99  Temp 97.5  F (36.4  C) (Oral)  Resp 20  Wt 59.7  kg (131 lb 11.2 oz)  SpO2 99%  BMI 23.33 kg/m2  Wt Readings from Last 4 Encounters:   03/03/17 59.7 kg (131 lb 11.2 oz)   03/03/17 59.7 kg (131 lb 11.2 oz)   02/24/17 60.3 kg (133 lb)   02/17/17 60 kg (132 lb 3.2 oz)     General: Patient alert and oriented x3.    EYES: PERRLA, conjunctiva pink, sclera is jaundice.   Neck: No palpable cervical, supraclavicular or axillary nodes bilaterally.   Cardiovascular: RRR, no murmurs, gallops or rubs  Respiratory: clear to auscultation bilaterally;  no crackles, rhonchi or wheezing.   Abdomen: positive bowel sounds in all four quadrants, soft, nontender  Skin: light jaundice, intact  Neuro: grossly intact.   Mood and affect is stable.       LABS:      3/3/2017 09:35   Sodium 140   Potassium 4.2   Chloride 104   Carbon Dioxide 23   Urea Nitrogen 41 (H)   Creatinine 1.23 (H)   GFR Estimate 43 (L)   GFR Estimate If Black 52 (L)   Calcium 8.6   Anion Gap 13   Albumin 3.1 (L)   Protein Total 6.9   Bilirubin Total 2.4 (H)   Alkaline Phosphatase 114   ALT 44   AST 23   Glucose 104 (H)   WBC 3.0 (L) ANC 1.2   Hemoglobin 9.0 (L)   Hematocrit 26.4 (L)   Platelet Count 14 (LL)   RBC Count 2.93 (L)   MCV 90         ASSESSMENT/PLAN: 71 year old female with AA, currently pancytopenic 2/2 to disease and further worsening from recent therapy    HEME- Treatment for AA 1/9-1/13/17 with ATG, methylpred, cyclosporine and eltrombopag.   -cyclosporine 100 mg BID decreased (2/22) due to ELSA  Improved today, thus keep at same dose. Continue weekly checks, keep level between 200-350. Next check on 3/7 at [facility].   -continue 30 mg Prednisone starting 2/25/17. Will decrease by 10 mg and continue EVERY OTHER WEEK (next change 3/10)  -eltrombopag 150 mg daily  -Will need transfusion support 3 x week, worked on schedule today mostly at  [facility] (Sunday, Tuesday each week, [place] Friday)  -transfuse if hgb <8 and platelets <30k   - will get 1 pack platelets today    CV-two prior provoked DVT in 2012 and 2014 with  travel. 2012 was associated with PE. Was on warfarin/lovenox in the past.  Transitioned to ASA which has been held in the setting of low platelets  -Norvasc stopped 1/25, BP stable    ID- ANC improved today at 1200. No fever or symptoms of infection.  -continue flucon 200 mg daily  - levaquin 250 mg daily   - CMV was neg  - inh pentamidine done today 2/24/17, next 3/24/17    MSK- low back -Low thoracic mid spine pain.  Last CXR showed compression fractures in her vertebral bodies.  Need to get some further imaging at some point.  For now continue prn tylenol and oxycodone    FEN: weight stable.  Eating OK, encouraged 1 + L hydration daily    GI: mild constipation, continue alternating senna + miralax.   -bili elevation, hemolysis was r/o, its all unconjugated- started with therapy likely 2/2 to csa   -continue ppx protonix     Sejal Walls PA-C

## 2017-03-09 ENCOUNTER — OFFICE VISIT (OUTPATIENT)
Dept: TRANSPLANT | Facility: CLINIC | Age: 72
End: 2017-03-09
Attending: INTERNAL MEDICINE
Payer: COMMERCIAL

## 2017-03-09 VITALS
BODY MASS INDEX: 23.16 KG/M2 | SYSTOLIC BLOOD PRESSURE: 126 MMHG | OXYGEN SATURATION: 94 % | TEMPERATURE: 97 F | WEIGHT: 130.73 LBS | DIASTOLIC BLOOD PRESSURE: 80 MMHG | RESPIRATION RATE: 16 BRPM | HEART RATE: 101 BPM

## 2017-03-09 DIAGNOSIS — D61.818 PANCYTOPENIA (H): ICD-10-CM

## 2017-03-09 DIAGNOSIS — D61.3 IDIOPATHIC APLASTIC ANEMIA (H): ICD-10-CM

## 2017-03-09 PROCEDURE — 99212 OFFICE O/P EST SF 10 MIN: CPT

## 2017-03-09 RX ORDER — CYCLOSPORINE 25 MG/1
75 CAPSULE, LIQUID FILLED ORAL 2 TIMES DAILY
Qty: 300 CAPSULE | Refills: 3 | Status: SHIPPED | OUTPATIENT
Start: 2017-03-09 | End: 2017-04-04

## 2017-03-09 RX ORDER — PREDNISONE 10 MG/1
10 TABLET ORAL DAILY
Qty: 180 TABLET | Refills: 3 | COMMUNITY
Start: 2017-03-09 | End: 2017-04-04

## 2017-03-09 ASSESSMENT — PAIN SCALES - GENERAL: PAINLEVEL: MILD PAIN (2)

## 2017-03-09 NOTE — MR AVS SNAPSHOT
After Visit Summary   3/9/2017    Bonny Raymundo    MRN: [id number]           Patient Information     Date Of Birth          1945        Visit Information        Provider Department      3/9/2017 3:00 PM Rafita Rogel MD [facility] Blood and Marrow Transplant        Today's Diagnoses     Pancytopenia (H)        Idiopathic aplastic anemia (H)              [facility] ([facility])  [address] [place] [address]  Phone: [phone]  Clinic Hours:   Monday-Friday:    8am to 4:30pm   Weekends and holidays:    8am to noon (in general)  If your fever is 100.5  or greater,   call the clinic.  After hours call the   hospital at [phone] or   [phone]. Ask for the BMT   fellow for pediatric or adult patients            Follow-ups after your visit        Follow-up notes from your care team     Return in about 2 weeks (around 3/23/2017) for Physical Exam, Lab Work, Routine Visit.      Your next 10 appointments already scheduled     Mar 10, 2017  9:00 AM CST   Level 5 with  INFUSION CHAIR 16   [facility] ([facility])    [facility]  [address]  [place] [address]   [phone]            Mar 12, 2017  8:30 AM CDT   Level 5 with  INFUSION CHAIR 19   [facility] ([facility])    [facility]  [address]  [place] [address]   [phone]            Mar 14, 2017  8:00 AM CDT   Level 5 with  INFUSION CHAIR 13   [facility] ([facility])    [facility]  [address]  [place] [address]   [phone]            Mar 17, 2017  9:30 AM CDT   [facility] Lab Draw with  [facility] LAB DRAW   [facility] Draw ([facility])    [address] [facility]  [address]nd [facility] [id number] [phone]             Mar 17, 2017 10:00 AM CDT   Infusion 360 with UC ONCOLOGY INFUSION, UC 12 ATC   [facility] ([facility])    [address]  2nd Floor  [facility] [address] [phone]            Mar 17, 2017 12:30 PM CDT   (Arrive by 12:15 PM)   Return Visit with CLARIBEL [facility] ([facility])    [address]  2nd Floor  [facility] [id number] [phone]            Mar 19, 2017  8:00 AM CDT   Level 5 with SH INFUSION CHAIR 13   [facility] ([facility])    [facility]  [address]  [facility] [address]   [phone]            Mar 21, 2017  9:30 AM CDT   Level 5 with SH INFUSION CHAIR 7   [facility] ([facility])    [facility] [place]  [address]  [facility] [address]   [phone]            Mar 24, 2017  9:30 AM CDT   (Arrive by 9:15 AM)   Return Visit with CLARIBEL [facility] ([facility])    [address]  [address] [facility] [address] [phone]              Future tests that were ordered for you today     Open Future Orders        Priority Expected Expires Ordered    CBC with platelets differential Routine 3/20/2017 4/1/2017 3/9/2017    Comprehensive metabolic panel Routine 3/20/2017 4/1/2017 3/9/2017    Cyclosporine Routine 3/20/2017 4/1/2017 3/9/2017    INR Routine 3/20/2017 4/1/2017 3/9/2017    Reticulocyte count Routine 3/20/2017 4/1/2017 3/9/2017            Who to contact     If you have questions or need follow up information about today's clinic visit or your schedule please contact [facility] BLOOD AND MARROW TRANSPLANT directly at [phone].  Normal or non-critical lab and imaging results will be communicated to you by MyChart, letter or  "phone within 4 business days after the clinic has received the results. If you do not hear from us within 7 days, please contact the clinic through [employer] or phone. If you have a critical or abnormal lab result, we will notify you by phone as soon as possible.  Submit refill requests through [employer] or call your pharmacy and they will forward the refill request to us. Please allow 3 business days for your refill to be completed.          Additional Information About Your Visit        [employer] Information     [employer] lets you send messages to your doctor, view your test results, renew your prescriptions, schedule appointments and more. To sign up, go to [email or web address] . Click on \"Log in\" on the left side of the screen, which will take you to the Welcome page. Then click on \"Sign up Now\" on the right side of the page.     You will be asked to enter the access code listed below, as well as some personal information. Please follow the directions to create your username and password.     Your access code is: [email or web address]  Expires: 3/15/2017 11:47 AM     Your access code will  in 90 days. If you need help or a new code, please call your [facility] clinic or [phone].        Care EveryWhere ID     This is your Care EveryWhere ID. This could be used by other organizations to access your [facility] medical records  [id number]        Your Vitals Were     Pulse Temperature Respirations Pulse Oximetry BMI (Body Mass Index)       101 97  F (36.1  C) (Oral) 16 94% 23.16 kg/m2        Blood Pressure from Last 3 Encounters:   17 126/80   17 137/83   17 109/68    Weight from Last 3 Encounters:   17 59.3 kg (130 lb 11.7 oz)   17 59.7 kg (131 lb 11.2 oz)   17 59.7 kg (131 lb 11.2 oz)                 Today's Medication Changes          These changes are accurate as of: 3/9/17  4:01 PM.  If you have any questions, ask your nurse or doctor.               Start taking these medicines.  "       Dose/Directions    cycloSPORINE modified 25 MG capsule   Commonly known as:  GENERIC EQUIVALENT   Used for:  Idiopathic aplastic anemia (H), Pancytopenia (H)   Replaces:  cycloSPORINE modified 100 MG capsule   Started by:  Rafita Rogel MD        Dose:  75 mg   Take 3 capsules (75 mg) by mouth 2 times daily   Quantity:  300 capsule   Refills:  3         These medicines have changed or have updated prescriptions.        Dose/Directions    predniSONE 10 MG tablet   Commonly known as:  DELTASONE   This may have changed:  how much to take   Used for:  Pancytopenia (H), Idiopathic aplastic anemia (H)   Changed by:  Rafita Rogel MD        Dose:  20 mg   Take 2 tablets (20 mg) by mouth daily Or as directed   Quantity:  180 tablet   Refills:  3         Stop taking these medicines if you haven't already. Please contact your care team if you have questions.     cycloSPORINE modified 100 MG capsule   Commonly known as:  GENERIC EQUIVALENT   Replaced by:  cycloSPORINE modified 25 MG capsule   Stopped by:  Rafita Rogel MD                Where to get your medicines      These medications were sent to [address] [address]    Hours:  TRANSPLANT PHONE NUMBER [phone] Phone:  [phone]     cycloSPORINE modified 25 MG capsule                Recent Review Flowsheet Data     BMT Recent Results Latest Ref Rng & Units 2/24/2017 2/26/2017 2/28/2017 3/3/2017 3/5/2017 3/7/2017 3/7/2017    WBC 4.0 - 11.0 10e9/L 3.0(L) 3.6(L) 3.4(L) 3.0(L) 3.8(L) 3.7(L) -    Hemoglobin 11.7 - 15.7 g/dL 7.9(L) 9.5(L) 9.4(L) 9.0(L) 8.5(L) 8.2(L) -    Platelet Count 150 - 450 10e9/L 22(LL) 58(L) 32(LL) 14(LL) 51(L) 28(LL) -    Neutrophils (Absolute) 1.6 - 8.3 10e9/L 1.1(L) 1.2(L) 1.2(L) 1.2(L) 1.3(L) 1.4(L) -    Sodium 133 - 144 mmol/L 138 136 137 140 - 138 -    Potassium 3.4 - 5.3 mmol/L 4.7 5.1 4.0 4.2 -  5.7(H) 4.0    Chloride 94 - 109 mmol/L 102 102 103 104 - 105 -    Glucose 70 - 99 mg/dL 104(H) 114(H) 95 104(H) - 108(H) -    Urea Nitrogen 7 - 30 mg/dL 51(H) 38(H) 45(H) 41(H) - 48(H) -    Creatinine 0.52 - 1.04 mg/dL 1.39(H) 1.37(H) 1.35(H) 1.23(H) - 1.32(H) -    Calcium (Total) 8.5 - 10.1 mg/dL 8.6 8.7 8.6 8.6 - 8.5 -    Protein (Total) 6.8 - 8.8 g/dL 6.7(L) 7.3 6.9 6.9 - 6.8 -    Albumin 3.4 - 5.0 g/dL 3.0(L) 3.3(L) 3.1(L) 3.1(L) - 3.0(L) -    Alkaline Phosphatase 40 - 150 U/L 79 100 121 114 - 97 -    AST 0 - 45 U/L 27 43 33 23 - 27 -    ALT 0 - 50 U/L 49 66(H) 64(H) 44 - 35 -    MCV 78 - 100 fl 87 87 86 90 88 89 -               Primary Care Provider Office Phone # Fax #    Rafita Rogel [phone]        [facility] [address]  [facility] [address]        Thank you!     Thank you for choosing [facility] BLOOD AND MARROW TRANSPLANT  for your care. Our goal is always to provide you with excellent care. Hearing back from our patients is one way we can continue to improve our services. Please take a few minutes to complete the written survey that you may receive in the mail after your visit with us. Thank you!             Your Updated Medication List - Protect others around you: Learn how to safely use, store and throw away your medicines at www.disposemymeds.org.          This list is accurate as of: 3/9/17  4:01 PM.  Always use your most recent med list.                   Brand Name Dispense Instructions for use    BENADRYL ALLERGY 25 MG tablet   Generic drug:  diphenhydrAMINE     56 tablet    Take 1 tablet (25 mg) by mouth nightly as needed       chlorpheniramine 4 MG tablet    CHLOR-TRIMETON     Take 4 mg by mouth every 6 hours as needed. For head cold       COMPRESSION STOCKINGS     2 each    Wear compression stockings at 20-30 mmHg rating most time during the day to the affected leg (left leg) or both legs. Take them off at night.       cycloSPORINE modified 25 MG  capsule    GENERIC EQUIVALENT    300 capsule    Take 3 capsules (75 mg) by mouth 2 times daily       diclofenac 1 % Gel topical gel    VOLTAREN    100 g    Apply 2 g topically 2 times daily       eltrombopag 50 MG tablet    PROMACTA    180 tablet    Take 3 tablets (150 mg) by mouth daily Administer on an empty stomach, 1 hour before or 2 hours after a meal. Or as directed       fluconazole 200 MG tablet    DIFLUCAN    30 tablet    Take 1 tablet (200 mg) by mouth daily       LEVAQUIN 250 MG tablet   Generic drug:  levofloxacin     30 tablet    Take 1 tablet (250 mg) by mouth daily       loratadine 10 MG tablet    CLARITIN     Take 10 mg by mouth daily as needed. For head cold       nystatin 739886 UNIT/ML suspension    MYCOSTATIN    280 mL    Take 5 mLs (500,000 Units) by mouth 4 times daily Swish and spit.       order for DME     1 Box    Equipment being ordered: knee high compression stockings- 18-20 mm       oxyCODONE 5 MG IR tablet    ROXICODONE    50 tablet    Take 1 tablet (5 mg) by mouth every 6 hours as needed for moderate to severe pain       pantoprazole 40 MG EC tablet    PROTONIX    30 tablet    Take 1 tablet (40 mg) by mouth every morning       polyethylene glycol Packet    MIRALAX/GLYCOLAX    14 packet    Take 17 g by mouth daily . If you start to have loose stools/diarrhea or multiple bowel movements, ok to hold this medication. Then resume if no bowel movement after 24-48hours.       predniSONE 10 MG tablet    DELTASONE    180 tablet    Take 2 tablets (20 mg) by mouth daily Or as directed       SENNA S 8.6-50 MG per tablet   Generic drug:  senna-docusate     100 tablet    Can take 1-2 tablets up to twice a day. Can start with 1-2 tablets daily. If no bowel movement within 24 hours, can take 1-2 tablets twice a day. HOLD if you develop diarrhea. Resume taking if no bowel movement in 24 hours.       TYLENOL PO      Take 325 mg by mouth every 6 hours as needed for mild pain or fever

## 2017-03-09 NOTE — PROGRESS NOTES
/80 (BP Location: Left arm, Patient Position: Chair, Cuff Size: Adult Regular)  Pulse 101  Temp 97  F (36.1  C) (Oral)  Resp 16  Wt 59.3 kg (130 lb 11.7 oz)  SpO2 94%  BMI 23.16 kg/m2  Wt Readings from Last 4 Encounters:   03/09/17 59.3 kg (130 lb 11.7 oz)   03/03/17 59.7 kg (131 lb 11.2 oz)   03/03/17 59.7 kg (131 lb 11.2 oz)   02/24/17 60.3 kg (133 lb)     Severe Aplastic anemia  No recent LGI or other bleeding.  No nose or dental bleeding.  Still some bruising;   Getting plts approx q3 days.   RBC approx q 2 weeks.  No fever/sweats/ chills      Eating ok.   Some R sided rib pain; worse going from standing to lying down. Not pleuritic.    Post ATG/CSA/Steroids/Eltrombopag for aplasia therpay early January.  ROS o/w neg    PHYSICAL EXAMINATION:   VITAL SIGNS: acceptable vital signs.   LYMPH:  She has no Supra clavic or cervical lymphadenopathy.   EXTREMITIES:  She has no peripheral edema.   SKIN:  No conjunctival or LE petech but few non palp bruises  HEENT:  Her oropharynx is clear without mucosal lesions.   Face cushingoid  LUNGS:  Clear.   CARDIOVASCULAR:  Heart tones are regular with a soft ejection murmur.   ABDOMEN:  Soft and nontender without hepatosplenomegaly or masses.      Labs;  Chem ok  Creat still 1.32; last CSA level ok   CBC with lower plts; slowly falling Hb; but  >1000  PMNs last few weeks  Retic not improved when last checked; recheck in 2 weeks.    IMpr;  Taking ongoing immunosuppression for aplasia  CSA reduce to 75 BID, pred reduced today to 20mg/d; cont  eltrombopag 150 mg/day    Cont and monitor csa levels targetting trough level 200-400  will decrease pred to 20 mg/day now.   After that will taper slowly; and then will reduce by 10mg every other week.    Heme;    Transfuse for Hb <8  Plts <30,000  Conditional orders in place.  Since she's not had bleeding. I told her we can change to twice weekly visits for transfusion at [facility].      FEN   Ok. Monitor creatinine and BP while on  CSA    GI/Nutrition.  No external signs of GI bleed.    Plan: OK to change schedule to two visits per week  RTC here in 2 weeks  Rafita Rogel MD  Professor of Medicine

## 2017-03-09 NOTE — NURSING NOTE
"Bonny Raymundo is a 71 year old female who presents for:  Chief Complaint   Patient presents with     Oncology Clinic Visit     Return for Aplastic Anemia         Initial Vitals:  /80 (BP Location: Left arm, Patient Position: Chair, Cuff Size: Adult Regular)  Pulse 101  Temp 97  F (36.1  C) (Oral)  Resp 16  Wt 59.3 kg (130 lb 11.7 oz)  SpO2 94%  BMI 23.16 kg/m2 Estimated body mass index is 23.16 kg/(m^2) as calculated from the following:    Height as of 1/23/17: 1.6 m (5' 3\").    Weight as of this encounter: 59.3 kg (130 lb 11.7 oz).. Body surface area is 1.62 meters squared. BP completed using cuff size: regular  Mild Pain (2) No LMP recorded. Patient has had a hysterectomy. Allergies and medications reviewed.     Medications: Medication refills not needed today.  Pharmacy name entered into [facility]:    [facility], [place] - [address]  [facility], [place] - [address]    Comments:     6 minutes for nursing intake (face to face time)   Janeth Schilling MA        "

## 2017-03-10 ENCOUNTER — INFUSION THERAPY VISIT (OUTPATIENT)
Dept: INFUSION THERAPY | Facility: CLINIC | Age: 72
End: 2017-03-10
Attending: INTERNAL MEDICINE
Payer: COMMERCIAL

## 2017-03-10 ENCOUNTER — HOSPITAL ENCOUNTER (OUTPATIENT)
Facility: CLINIC | Age: 72
Setting detail: SPECIMEN
Discharge: HOME | End: 2017-03-10
Attending: INTERNAL MEDICINE | Admitting: INTERNAL MEDICINE
Payer: COMMERCIAL

## 2017-03-10 VITALS — DIASTOLIC BLOOD PRESSURE: 88 MMHG | TEMPERATURE: 98.1 F | HEART RATE: 75 BPM | SYSTOLIC BLOOD PRESSURE: 136 MMHG

## 2017-03-10 DIAGNOSIS — D61.3 IDIOPATHIC APLASTIC ANEMIA (H): ICD-10-CM

## 2017-03-10 DIAGNOSIS — D61.818 PANCYTOPENIA (H): Primary | ICD-10-CM

## 2017-03-10 LAB
ABO + RH BLD: NORMAL
ABO + RH BLD: NORMAL
BASOPHILS # BLD AUTO: 0 10E9/L (ref 0–0.2)
BASOPHILS NFR BLD AUTO: 0 %
BLD GP AB SCN SERPL QL: NORMAL
BLD PROD TYP BPU: NORMAL
BLD PROD TYP BPU: NORMAL
BLD UNIT ID BPU: 0
BLOOD BANK CMNT PATIENT-IMP: NORMAL
BLOOD PRODUCT CODE: NORMAL
BPU ID: NORMAL
DIFFERENTIAL METHOD BLD: ABNORMAL
EOSINOPHIL # BLD AUTO: 0 10E9/L (ref 0–0.7)
EOSINOPHIL NFR BLD AUTO: 0 %
ERYTHROCYTE [DISTWIDTH] IN BLOOD BY AUTOMATED COUNT: 20.6 % (ref 10–15)
HCT VFR BLD AUTO: 22.6 % (ref 35–47)
HGB BLD-MCNC: 7.8 G/DL (ref 11.7–15.7)
LYMPHOCYTES # BLD AUTO: 1.5 10E9/L (ref 0.8–5.3)
LYMPHOCYTES NFR BLD AUTO: 49 %
MACROCYTES BLD QL SMEAR: PRESENT
MCH RBC QN AUTO: 31.3 PG (ref 26.5–33)
MCHC RBC AUTO-ENTMCNC: 34.5 G/DL (ref 31.5–36.5)
MCV RBC AUTO: 91 FL (ref 78–100)
MONOCYTES # BLD AUTO: 0.2 10E9/L (ref 0–1.3)
MONOCYTES NFR BLD AUTO: 7 %
NEUTROPHILS # BLD AUTO: 1.3 10E9/L (ref 1.6–8.3)
NEUTROPHILS NFR BLD AUTO: 44 %
NRBC # BLD AUTO: 0.3 10*3/UL
NRBC BLD AUTO-RTO: 10 /100
NUM BPU REQUESTED: 1
OVALOCYTES BLD QL SMEAR: SLIGHT
PLATELET # BLD AUTO: 47 10E9/L (ref 150–450)
PLATELET # BLD EST: ABNORMAL 10*3/UL
RBC # BLD AUTO: 2.49 10E12/L (ref 3.8–5.2)
RBC INCLUSIONS BLD: SLIGHT
SPECIMEN EXP DATE BLD: NORMAL
TARGETS BLD QL SMEAR: ABNORMAL
TRANSFUSION STATUS PATIENT QL: NORMAL
TRANSFUSION STATUS PATIENT QL: NORMAL
WBC # BLD AUTO: 3 10E9/L (ref 4–11)

## 2017-03-10 PROCEDURE — 86850 RBC ANTIBODY SCREEN: CPT | Performed by: INTERNAL MEDICINE

## 2017-03-10 PROCEDURE — P9040 RBC LEUKOREDUCED IRRADIATED: HCPCS | Performed by: INTERNAL MEDICINE

## 2017-03-10 PROCEDURE — 86901 BLOOD TYPING SEROLOGIC RH(D): CPT | Performed by: INTERNAL MEDICINE

## 2017-03-10 PROCEDURE — 86900 BLOOD TYPING SEROLOGIC ABO: CPT | Performed by: INTERNAL MEDICINE

## 2017-03-10 PROCEDURE — 85025 COMPLETE CBC W/AUTO DIFF WBC: CPT | Performed by: INTERNAL MEDICINE

## 2017-03-10 PROCEDURE — 36430 TRANSFUSION BLD/BLD COMPNT: CPT

## 2017-03-10 PROCEDURE — 86923 COMPATIBILITY TEST ELECTRIC: CPT | Performed by: INTERNAL MEDICINE

## 2017-03-10 ASSESSMENT — PAIN SCALES - GENERAL: PAINLEVEL: NO PAIN (0)

## 2017-03-10 NOTE — PROGRESS NOTES
Infusion Nursing Note:  Bonny Raymundo presents today for possible blood transfusion.    Patient seen by provider today: No   present during visit today: Not Applicable.    Note: HGB: 7.8; Platelet count 47,000.    Intravenous Access:  PICC.    Treatment Conditions:  Not Applicable.      Post Infusion Assessment:  Blood return noted pre and post infusion.  Site patent and intact, free from redness, edema or discomfort.  No evidence of extravasations.  Access discontinued per protocol.    Discharge Plan:   Copy of AVS reviewed with patient and/or family. Patient discharged in stable condition accompanied by: self.  Departure Mode: Ambulatory.    Carmina Walls, ANABELLE Singh RN

## 2017-03-10 NOTE — MR AVS SNAPSHOT
After Visit Summary   3/10/2017    Bonny Raymundo    MRN: [id number]           Patient Information     Date Of Birth          1945        Visit Information        Provider Department      3/10/2017 9:00 AM  INFUSION CHAIR 16 [facility]        Today's Diagnoses     Pancytopenia (H)    -  1    Idiopathic aplastic anemia (H)           Follow-ups after your visit        Your next 10 appointments already scheduled     Mar 12, 2017  8:30 AM CDT   Level 5 with  INFUSION CHAIR 19   [facility] ([facility])    [facility] [place]  [address]  [place] [address]   [phone]            Mar 15, 2017 10:00 AM CDT   Level 5 with  INFUSION CHAIR 10   [facility] ([facility])    [facility] [place]  [address]  [place] [address]   [phone]            Mar 17, 2017 12:30 PM CDT   (Arrive by 12:15 PM)   Return Visit with CLARIBEL Grant [facility] ([facility])    [address]  [address] [facility] [id number] [phone]            Mar 19, 2017  8:00 AM CDT   Level 5 with  INFUSION CHAIR 13   [facility] ([facility])    [facility] [place]  [address]  [place] [address]   [phone]            Mar 22, 2017 10:00 AM CDT   Level 5 with  INFUSION CHAIR 6   [facility] ([facility])    [facility] [place]  [address]  [place] [address]   [phone]            Mar 24, 2017  9:30 AM CDT   (Arrive by 9:15 AM)   Return Visit with CLARIBEL Grant [facility] ([facility])    [address]  [address] [facility] [id number] [phone]     "          Future tests that were ordered for you today     Open Standing Orders        Priority Remaining Interval Expires Ordered    Platelets prepare order unit Routine 100/100 CONDITIONAL (SPECIFY) BLOOD  3/10/2017    Transfuse platelets unit Routine 100/100 TRANSFUSE 1 DOSE  3/10/2017    Red blood cell prepare order unit Routine 99/100 CONDITIONAL (SPECIFY) BLOOD  3/10/2017    Transfuse red blood cell unit Routine 99/100 TRANSFUSE 1 UNIT  3/10/2017          Open Future Orders        Priority Expected Expires Ordered    CBC with platelets differential Routine 3/20/2017 4/1/2017 3/9/2017    Comprehensive metabolic panel Routine 3/20/2017 4/1/2017 3/9/2017    Cyclosporine Routine 3/20/2017 4/1/2017 3/9/2017    INR Routine 3/20/2017 4/1/2017 3/9/2017    Reticulocyte count Routine 3/20/2017 4/1/2017 3/9/2017            Who to contact     If you have questions or need follow up information about today's clinic visit or your schedule please contact [facility] directly at [phone].  Normal or non-critical lab and imaging results will be communicated to you by ONEHOPEhart, letter or phone within 4 business days after the clinic has received the results. If you do not hear from us within 7 days, please contact the clinic through [employer] or phone. If you have a critical or abnormal lab result, we will notify you by phone as soon as possible.  Submit refill requests through [employer] or call your pharmacy and they will forward the refill request to us. Please allow 3 business days for your refill to be completed.          Additional Information About Your Visit        ONEHOPEhart Information     [employer] lets you send messages to your doctor, view your test results, renew your prescriptions, schedule appointments and more. To sign up, go to [email or web address] . Click on \"Log in\" on the left side of the screen, which will take you to the Welcome page. Then click on \"Sign up Now\" on the right " side of the page.     You will be asked to enter the access code listed below, as well as some personal information. Please follow the directions to create your username and password.     Your access code is: [email or web address]  Expires: 3/15/2017 11:47 AM     Your access code will  in 90 days. If you need help or a new code, please call your [facility] or [phone].        Care EveryWhere ID     This is your Care EveryWhere ID. This could be used by other organizations to access your [facility] medical records  [id number]        Your Vitals Were     Pulse Temperature                75 98.1  F (36.7  C) (Oral)           Blood Pressure from Last 3 Encounters:   03/10/17 136/88   17 126/80   17 137/83    Weight from Last 3 Encounters:   17 59.3 kg (130 lb 11.7 oz)   17 59.7 kg (131 lb 11.2 oz)   17 59.7 kg (131 lb 11.2 oz)              We Performed the Following     ABO/Rh type and screen     Blood component     CBC with platelets differential     Obtain affirmation of informed consent     Red blood cell prepare order unit     Transfuse red blood cell unit     Treatment Conditions        Primary Care Provider Office Phone # Fax #    Rafita Rogel [phone]        PHYSICIANS [address] [facility] [address]  [facility] [address]        Thank you!     Thank you for choosing [facility]  for your care. Our goal is always to provide you with excellent care. Hearing back from our patients is one way we can continue to improve our services. Please take a few minutes to complete the written survey that you may receive in the mail after your visit with us. Thank you!             Your Updated Medication List - Protect others around you: Learn how to safely use, store and throw away your medicines at www.disposemymeds.org.          This list is accurate as of: 3/10/17  1:14 PM.  Always use your most recent med list.                    Brand Name Dispense Instructions for use    BENADRYL ALLERGY 25 MG tablet   Generic drug:  diphenhydrAMINE     56 tablet    Take 1 tablet (25 mg) by mouth nightly as needed       chlorpheniramine 4 MG tablet    CHLOR-TRIMETON     Take 4 mg by mouth every 6 hours as needed. For head cold       COMPRESSION STOCKINGS     2 each    Wear compression stockings at 20-30 mmHg rating most time during the day to the affected leg (left leg) or both legs. Take them off at night.       cycloSPORINE modified 25 MG capsule    GENERIC EQUIVALENT    300 capsule    Take 3 capsules (75 mg) by mouth 2 times daily       diclofenac 1 % Gel topical gel    VOLTAREN    100 g    Apply 2 g topically 2 times daily       eltrombopag 50 MG tablet    PROMACTA    180 tablet    Take 3 tablets (150 mg) by mouth daily Administer on an empty stomach, 1 hour before or 2 hours after a meal. Or as directed       fluconazole 200 MG tablet    DIFLUCAN    30 tablet    Take 1 tablet (200 mg) by mouth daily       LEVAQUIN 250 MG tablet   Generic drug:  levofloxacin     30 tablet    Take 1 tablet (250 mg) by mouth daily       loratadine 10 MG tablet    CLARITIN     Take 10 mg by mouth daily as needed. For head cold       nystatin 989543 UNIT/ML suspension    MYCOSTATIN    280 mL    Take 5 mLs (500,000 Units) by mouth 4 times daily Swish and spit.       order for DME     1 Box    Equipment being ordered: knee high compression stockings- 18-20 mm       oxyCODONE 5 MG IR tablet    ROXICODONE    50 tablet    Take 1 tablet (5 mg) by mouth every 6 hours as needed for moderate to severe pain       pantoprazole 40 MG EC tablet    PROTONIX    30 tablet    Take 1 tablet (40 mg) by mouth every morning       polyethylene glycol Packet    MIRALAX/GLYCOLAX    14 packet    Take 17 g by mouth daily . If you start to have loose stools/diarrhea or multiple bowel movements, ok to hold this medication. Then resume if no bowel movement after 24-48hours.       predniSONE 10 MG  tablet    DELTASONE    180 tablet    Take 2 tablets (20 mg) by mouth daily Or as directed       SENNA S 8.6-50 MG per tablet   Generic drug:  senna-docusate     100 tablet    Can take 1-2 tablets up to twice a day. Can start with 1-2 tablets daily. If no bowel movement within 24 hours, can take 1-2 tablets twice a day. HOLD if you develop diarrhea. Resume taking if no bowel movement in 24 hours.       TYLENOL PO      Take 325 mg by mouth every 6 hours as needed for mild pain or fever

## 2017-03-12 ENCOUNTER — HOSPITAL ENCOUNTER (OUTPATIENT)
Facility: CLINIC | Age: 72
Setting detail: SPECIMEN
Discharge: HOME | End: 2017-03-12
Attending: PHYSICIAN ASSISTANT | Admitting: PHYSICIAN ASSISTANT
Payer: COMMERCIAL

## 2017-03-12 ENCOUNTER — INFUSION THERAPY VISIT (OUTPATIENT)
Dept: INFUSION THERAPY | Facility: CLINIC | Age: 72
End: 2017-03-12
Attending: INTERNAL MEDICINE

## 2017-03-12 VITALS
HEART RATE: 85 BPM | SYSTOLIC BLOOD PRESSURE: 147 MMHG | DIASTOLIC BLOOD PRESSURE: 96 MMHG | RESPIRATION RATE: 14 BRPM | TEMPERATURE: 97.9 F

## 2017-03-12 DIAGNOSIS — D61.3 IDIOPATHIC APLASTIC ANEMIA (H): Primary | ICD-10-CM

## 2017-03-12 LAB
BASOPHILS # BLD AUTO: 0 10E9/L (ref 0–0.2)
BASOPHILS NFR BLD AUTO: 0 %
DIFFERENTIAL METHOD BLD: ABNORMAL
EOSINOPHIL # BLD AUTO: 0 10E9/L (ref 0–0.7)
EOSINOPHIL NFR BLD AUTO: 0 %
ERYTHROCYTE [DISTWIDTH] IN BLOOD BY AUTOMATED COUNT: 19.8 % (ref 10–15)
HCT VFR BLD AUTO: 27.8 % (ref 35–47)
HGB BLD-MCNC: 9.5 G/DL (ref 11.7–15.7)
LYMPHOCYTES # BLD AUTO: 2.1 10E9/L (ref 0.8–5.3)
LYMPHOCYTES NFR BLD AUTO: 61 %
MCH RBC QN AUTO: 30.5 PG (ref 26.5–33)
MCHC RBC AUTO-ENTMCNC: 34.2 G/DL (ref 31.5–36.5)
MCV RBC AUTO: 89 FL (ref 78–100)
MONOCYTES # BLD AUTO: 0.2 10E9/L (ref 0–1.3)
MONOCYTES NFR BLD AUTO: 7 %
NEUTROPHILS # BLD AUTO: 1.1 10E9/L (ref 1.6–8.3)
NEUTROPHILS NFR BLD AUTO: 32 %
NRBC # BLD AUTO: 0.2 10*3/UL
NRBC BLD AUTO-RTO: 6 /100
PLATELET # BLD AUTO: 38 10E9/L (ref 150–450)
RBC # BLD AUTO: 3.11 10E12/L (ref 3.8–5.2)
WBC # BLD AUTO: 3.5 10E9/L (ref 4–11)

## 2017-03-12 PROCEDURE — 85025 COMPLETE CBC W/AUTO DIFF WBC: CPT | Performed by: PHYSICIAN ASSISTANT

## 2017-03-12 NOTE — MR AVS SNAPSHOT
After Visit Summary   3/12/2017    Bonny Raymundo    MRN: [id number]           Patient Information     Date Of Birth          1945        Visit Information        Provider Department      3/12/2017 8:30 AM  INFUSION CHAIR 19 [facility]        Today's Diagnoses     Idiopathic aplastic anemia (H)    -  1       Follow-ups after your visit        Follow-up notes from your care team     Return in 3 days (on 3/15/2017) for Lab Work.      Your next 10 appointments already scheduled     Mar 15, 2017 10:00 AM CDT   Level 5 with  INFUSION CHAIR 10   [facility] ([facility])    [facility]  [address]  [place] [address]   [phone]            Mar 17, 2017 12:30 PM CDT   (Arrive by 12:15 PM)   Return Visit with CLARIBEL Grant [facility] ([facility])    [address] [place] [address]  [address] [facility] [address] [phone]            Mar 19, 2017  8:00 AM CDT   Level 5 with  INFUSION CHAIR 13   [facility] ([facility])    [facility]  [address]  [place] [address]   [phone]            Mar 22, 2017 10:00 AM CDT   Level 5 with  INFUSION CHAIR 6   [facility] ([facility])    [facility]  [address]  [place] [address]   [phone]            Mar 24, 2017  9:30 AM CDT   (Arrive by 9:15 AM)   Return Visit with CLARIBEL Grant [facility])    [address] [place] [address] [phone]              Who to contact     If you have questions or need follow up information about today's clinic visit or your schedule please contact [facility]  "CENTER directly at [phone].  Normal or non-critical lab and imaging results will be communicated to you by Looking for Gamershart, letter or phone within 4 business days after the clinic has received the results. If you do not hear from us within 7 days, please contact the clinic through Looking for Gamershart or phone. If you have a critical or abnormal lab result, we will notify you by phone as soon as possible.  Submit refill requests through [employer] or call your pharmacy and they will forward the refill request to us. Please allow 3 business days for your refill to be completed.          Additional Information About Your Visit        Looking for Gamers[employer] Information     [employer] lets you send messages to your doctor, view your test results, renew your prescriptions, schedule appointments and more. To sign up, go to [email or web address] . Click on \"Log in\" on the left side of the screen, which will take you to the Welcome page. Then click on \"Sign up Now\" on the right side of the page.     You will be asked to enter the access code listed below, as well as some personal information. Please follow the directions to create your username and password.     Your access code is: [email or web address]  Expires: 3/15/2017 12:47 PM     Your access code will  in 90 days. If you need help or a new code, please call your [place] clinic or [phone].        Care EveryWhere ID     This is your Care EveryWhere ID. This could be used by other organizations to access your [place] medical records  [id number]        Your Vitals Were     Pulse Temperature Respirations             85 97.9  F (36.6  C) (Oral) 14          Blood Pressure from Last 3 Encounters:   17 (!) 147/96   03/10/17 136/88   17 126/80    Weight from Last 3 Encounters:   17 59.3 kg (130 lb 11.7 oz)   17 59.7 kg (131 lb 11.2 oz)   17 59.7 kg (131 lb 11.2 oz)              We Performed the Following     CBC with platelets differential     MD Instruction for Therapy Plan  "       Primary Care Provider Office Phone # Fax #    Rafita Rogel [phone]        PHYSICIANS [address] [facility] [address]  [facility] [address]        Thank you!     Thank you for choosing [facility]  for your care. Our goal is always to provide you with excellent care. Hearing back from our patients is one way we can continue to improve our services. Please take a few minutes to complete the written survey that you may receive in the mail after your visit with us. Thank you!             Your Updated Medication List - Protect others around you: Learn how to safely use, store and throw away your medicines at www.disposemymeds.org.          This list is accurate as of: 3/12/17 10:35 AM.  Always use your most recent med list.                   Brand Name Dispense Instructions for use    BENADRYL ALLERGY 25 MG tablet   Generic drug:  diphenhydrAMINE     56 tablet    Take 1 tablet (25 mg) by mouth nightly as needed       chlorpheniramine 4 MG tablet    CHLOR-TRIMETON     Take 4 mg by mouth every 6 hours as needed. For head cold       COMPRESSION STOCKINGS     2 each    Wear compression stockings at 20-30 mmHg rating most time during the day to the affected leg (left leg) or both legs. Take them off at night.       cycloSPORINE modified 25 MG capsule    GENERIC EQUIVALENT    300 capsule    Take 3 capsules (75 mg) by mouth 2 times daily       diclofenac 1 % Gel topical gel    VOLTAREN    100 g    Apply 2 g topically 2 times daily       eltrombopag 50 MG tablet    PROMACTA    180 tablet    Take 3 tablets (150 mg) by mouth daily Administer on an empty stomach, 1 hour before or 2 hours after a meal. Or as directed       fluconazole 200 MG tablet    DIFLUCAN    30 tablet    Take 1 tablet (200 mg) by mouth daily       LEVAQUIN 250 MG tablet   Generic drug:  levofloxacin     30 tablet    Take 1 tablet (250 mg) by mouth daily       loratadine 10 MG tablet    CLARITIN      Take 10 mg by mouth daily as needed. For head cold       nystatin [id number] UNIT/ML suspension    MYCOSTATIN    280 mL    Take 5 mLs (500,000 Units) by mouth 4 times daily Swish and spit.       order for DME     1 Box    Equipment being ordered: knee high compression stockings- 18-20 mm       oxyCODONE 5 MG IR tablet    ROXICODONE    50 tablet    Take 1 tablet (5 mg) by mouth every 6 hours as needed for moderate to severe pain       pantoprazole 40 MG EC tablet    PROTONIX    30 tablet    Take 1 tablet (40 mg) by mouth every morning       polyethylene glycol Packet    MIRALAX/GLYCOLAX    14 packet    Take 17 g by mouth daily . If you start to have loose stools/diarrhea or multiple bowel movements, ok to hold this medication. Then resume if no bowel movement after 24-48hours.       predniSONE 10 MG tablet    DELTASONE    180 tablet    Take 2 tablets (20 mg) by mouth daily Or as directed       SENNA S 8.6-50 MG per tablet   Generic drug:  senna-docusate     100 tablet    Can take 1-2 tablets up to twice a day. Can start with 1-2 tablets daily. If no bowel movement within 24 hours, can take 1-2 tablets twice a day. HOLD if you develop diarrhea. Resume taking if no bowel movement in 24 hours.       TYLENOL PO      Take 325 mg by mouth every 6 hours as needed for mild pain or fever

## 2017-03-12 NOTE — PROGRESS NOTES
Infusion Nursing Note:  Bonny Raymundo presents today for labs and possible transfusion.    Patient seen by provider today: No   present during visit today: Not Applicable.    Note: N/A.    Intravenous Access:  Labs drawn without difficulty.  PICC.    Treatment Conditions:  Lab Results   Component Value Date    HGB 9.5 03/12/2017     Lab Results   Component Value Date    WBC 3.5 03/12/2017      Lab Results   Component Value Date    ANEU 1.3 03/10/2017     Lab Results   Component Value Date    PLT 38 03/12/2017      Results reviewed, labs MET treatment parameters, ok to proceed with treatment.      Post Infusion Assessment:  Blood return noted pre and post infusion.  Site patent and intact, free from redness, edema or discomfort.  No evidence of extravasations.    Discharge Plan:   Discharge instructions reviewed with: Patient.  Patient and/or family verbalized understanding of discharge instructions and all questions answered.  Copy of AVS reviewed with patient and/or family.  Patient will return 3- for next appointment.  Patient discharged in stable condition accompanied by: self.  Departure Mode: Ambulatory.    Evelyn Bruner RN

## 2017-03-15 ENCOUNTER — HOSPITAL ENCOUNTER (OUTPATIENT)
Facility: CLINIC | Age: 72
Setting detail: SPECIMEN
Discharge: HOME | End: 2017-03-15
Attending: PHYSICIAN ASSISTANT | Admitting: PHYSICIAN ASSISTANT
Payer: COMMERCIAL

## 2017-03-15 ENCOUNTER — INFUSION THERAPY VISIT (OUTPATIENT)
Dept: INFUSION THERAPY | Facility: CLINIC | Age: 72
End: 2017-03-15
Attending: INTERNAL MEDICINE
Payer: COMMERCIAL

## 2017-03-15 VITALS
TEMPERATURE: 98.1 F | RESPIRATION RATE: 16 BRPM | SYSTOLIC BLOOD PRESSURE: 134 MMHG | OXYGEN SATURATION: 92 % | HEART RATE: 77 BPM | DIASTOLIC BLOOD PRESSURE: 89 MMHG

## 2017-03-15 DIAGNOSIS — D61.3 IDIOPATHIC APLASTIC ANEMIA (H): Primary | ICD-10-CM

## 2017-03-15 DIAGNOSIS — D61.818 PANCYTOPENIA (H): ICD-10-CM

## 2017-03-15 LAB
ALBUMIN SERPL-MCNC: 3.2 G/DL (ref 3.4–5)
ALP SERPL-CCNC: 81 U/L (ref 40–150)
ALT SERPL W P-5'-P-CCNC: 33 U/L (ref 0–50)
ANION GAP SERPL CALCULATED.3IONS-SCNC: 7 MMOL/L (ref 3–14)
AST SERPL W P-5'-P-CCNC: 18 U/L (ref 0–45)
BASOPHILS # BLD AUTO: 0 10E9/L (ref 0–0.2)
BASOPHILS NFR BLD AUTO: 0 %
BILIRUB SERPL-MCNC: 2.3 MG/DL (ref 0.2–1.3)
BLD PROD TYP BPU: NORMAL
BLD PROD TYP BPU: NORMAL
BLD UNIT ID BPU: 0
BLOOD PRODUCT CODE: NORMAL
BPU ID: NORMAL
BUN SERPL-MCNC: 48 MG/DL (ref 7–30)
CALCIUM SERPL-MCNC: 8.6 MG/DL (ref 8.5–10.1)
CHLORIDE SERPL-SCNC: 104 MMOL/L (ref 94–109)
CO2 SERPL-SCNC: 27 MMOL/L (ref 20–32)
CREAT SERPL-MCNC: 1.2 MG/DL (ref 0.52–1.04)
DIFFERENTIAL METHOD BLD: ABNORMAL
EOSINOPHIL # BLD AUTO: 0 10E9/L (ref 0–0.7)
EOSINOPHIL NFR BLD AUTO: 0 %
ERYTHROCYTE [DISTWIDTH] IN BLOOD BY AUTOMATED COUNT: 20.5 % (ref 10–15)
GFR SERPL CREATININE-BSD FRML MDRD: 44 ML/MIN/1.7M2
GLUCOSE SERPL-MCNC: 98 MG/DL (ref 70–99)
HCT VFR BLD AUTO: 25.9 % (ref 35–47)
HGB BLD-MCNC: 8.8 G/DL (ref 11.7–15.7)
INR PPP: 0.95 (ref 0.86–1.14)
LYMPHOCYTES # BLD AUTO: 1.8 10E9/L (ref 0.8–5.3)
LYMPHOCYTES NFR BLD AUTO: 53 %
MCH RBC QN AUTO: 31 PG (ref 26.5–33)
MCHC RBC AUTO-ENTMCNC: 34 G/DL (ref 31.5–36.5)
MCV RBC AUTO: 91 FL (ref 78–100)
MONOCYTES # BLD AUTO: 0.2 10E9/L (ref 0–1.3)
MONOCYTES NFR BLD AUTO: 5 %
NEUTROPHILS # BLD AUTO: 1.4 10E9/L (ref 1.6–8.3)
NEUTROPHILS NFR BLD AUTO: 42 %
NRBC # BLD AUTO: 0.3 10*3/UL
NRBC BLD AUTO-RTO: 9 /100
NUM BPU REQUESTED: 1
PLATELET # BLD AUTO: 19 10E9/L (ref 150–450)
POTASSIUM SERPL-SCNC: 4 MMOL/L (ref 3.4–5.3)
PROT SERPL-MCNC: 6.8 G/DL (ref 6.8–8.8)
RBC # BLD AUTO: 2.84 10E12/L (ref 3.8–5.2)
RETICS # AUTO: 87.2 10E9/L (ref 25–95)
RETICS/RBC NFR AUTO: 3.1 % (ref 0.5–2)
SODIUM SERPL-SCNC: 138 MMOL/L (ref 133–144)
TRANSFUSION STATUS PATIENT QL: NORMAL
TRANSFUSION STATUS PATIENT QL: NORMAL
WBC # BLD AUTO: 3.4 10E9/L (ref 4–11)

## 2017-03-15 PROCEDURE — P9037 PLATE PHERES LEUKOREDU IRRAD: HCPCS | Performed by: PHYSICIAN ASSISTANT

## 2017-03-15 PROCEDURE — 85025 COMPLETE CBC W/AUTO DIFF WBC: CPT | Performed by: PHYSICIAN ASSISTANT

## 2017-03-15 PROCEDURE — 85045 AUTOMATED RETICULOCYTE COUNT: CPT | Performed by: PHYSICIAN ASSISTANT

## 2017-03-15 PROCEDURE — 85610 PROTHROMBIN TIME: CPT | Performed by: PHYSICIAN ASSISTANT

## 2017-03-15 PROCEDURE — 80053 COMPREHEN METABOLIC PANEL: CPT | Performed by: PHYSICIAN ASSISTANT

## 2017-03-15 PROCEDURE — 80158 DRUG ASSAY CYCLOSPORINE: CPT | Performed by: PHYSICIAN ASSISTANT

## 2017-03-15 PROCEDURE — 36430 TRANSFUSION BLD/BLD COMPNT: CPT

## 2017-03-15 NOTE — MR AVS SNAPSHOT
After Visit Summary   3/15/2017    Bonny Raymundo    MRN: [id number]           Patient Information     Date Of Birth          1945        Visit Information        Provider Department      3/15/2017 10:00 AM  INFUSION CHAIR 10 [facility]        Today's Diagnoses     Idiopathic aplastic anemia (H)    -  1    Pancytopenia (H)           Follow-ups after your visit        Follow-up notes from your care team     Return in 4 days (on 3/19/2017).      Your next 10 appointments already scheduled     Mar 17, 2017 12:30 PM CDT   (Arrive by 12:15 PM)   Return Visit with CLARIBEL [facility] ([facility])    [address]  [address] [facility] [address]   [phone]            Mar 19, 2017  8:00 AM CDT   Level 5 with  INFUSION CHAIR 13   [facility]fusion [facility] ([facility])    [facility]  [address]  [facility] [address]   [phone]            Mar 22, 2017 10:00 AM CDT   Level 5 with  INFUSION CHAIR 6   [facility] ([facility])    [facility]  [address]  [facility] [address]   [phone]            Mar 24, 2017  9:30 AM CDT   (Arrive by 9:15 AM)   Return Visit with CLARIBEL [facility] ([facility])    [address]  [address] [facility] [address]   [phone]              Future tests that were ordered for you today     Open Standing Orders        Priority Remaining Interval Expires Ordered    Platelets prepare order unit Routine 99/100 CONDITIONAL (SPECIFY) BLOOD  3/15/2017    Transfuse platelets unit Routine 99/100 TRANSFUSE 1 DOSE  3/15/2017            Who to contact     If you have questions or need follow up information about today's clinic visit or your  "schedule please contact [facility] directly at [phone].  Normal or non-critical lab and imaging results will be communicated to you by MyChart, letter or phone within 4 business days after the clinic has received the results. If you do not hear from us within 7 days, please contact the clinic through Isonashart or phone. If you have a critical or abnormal lab result, we will notify you by phone as soon as possible.  Submit refill requests through iYogi or call your pharmacy and they will forward the refill request to us. Please allow 3 business days for your refill to be completed.          Additional Information About Your Visit        [facility] Information     iYogi lets you send messages to your doctor, view your test results, renew your prescriptions, schedule appointments and more. To sign up, go to [email or web address] . Click on \"Log in\" on the left side of the screen, which will take you to the Welcome page. Then click on \"Sign up Now\" on the right side of the page.     You will be asked to enter the access code listed below, as well as some personal information. Please follow the directions to create your username and password.     Your access code is: [email or web address]  Expires: 2017  1:59 PM     Your access code will  in 90 days. If you need help or a new code, please call your [place] clinic or [phone].        Care EveryWhere ID     This is your Care EveryWhere ID. This could be used by other organizations to access your [place] medical records  [id number]        Your Vitals Were     Pulse Temperature Respirations Pulse Oximetry          77 98.1  F (36.7  C) (Oral) 16 92%         Blood Pressure from Last 3 Encounters:   03/15/17 134/89   17 (!) 147/96   03/10/17 136/88    Weight from Last 3 Encounters:   17 59.3 kg (130 lb 11.7 oz)   17 59.7 kg (131 lb 11.2 oz)   17 59.7 kg (131 lb 11.2 oz)              We Performed the " Following     Blood component     CBC with platelets differential     Comprehensive metabolic panel     Cyclosporine     INR     Platelets prepare order unit     Reticulocyte count     Transfuse platelets unit        Primary Care Provider Office Phone # Fax #    Rafita Rogel [phone]        PHYSICIANS [address]  [facility] [address]        Thank you!     Thank you for choosing [facility] INFUSION CENTER  for your care. Our goal is always to provide you with excellent care. Hearing back from our patients is one way we can continue to improve our services. Please take a few minutes to complete the written survey that you may receive in the mail after your visit with us. Thank you!             Your Updated Medication List - Protect others around you: Learn how to safely use, store and throw away your medicines at www.disposemymeds.org.          This list is accurate as of: 3/15/17  1:59 PM.  Always use your most recent med list.                   Brand Name Dispense Instructions for use    BENADRYL ALLERGY 25 MG tablet   Generic drug:  diphenhydrAMINE     56 tablet    Take 1 tablet (25 mg) by mouth nightly as needed       chlorpheniramine 4 MG tablet    CHLOR-TRIMETON     Take 4 mg by mouth every 6 hours as needed. For head cold       COMPRESSION STOCKINGS     2 each    Wear compression stockings at 20-30 mmHg rating most time during the day to the affected leg (left leg) or both legs. Take them off at night.       cycloSPORINE modified 25 MG capsule    GENERIC EQUIVALENT    300 capsule    Take 3 capsules (75 mg) by mouth 2 times daily       diclofenac 1 % Gel topical gel    VOLTAREN    100 g    Apply 2 g topically 2 times daily       eltrombopag 50 MG tablet    PROMACTA    180 tablet    Take 3 tablets (150 mg) by mouth daily Administer on an empty stomach, 1 hour before or 2 hours after a meal. Or as directed       fluconazole 200 MG tablet    DIFLUCAN     30 tablet    Take 1 tablet (200 mg) by mouth daily       LEVAQUIN 250 MG tablet   Generic drug:  levofloxacin     30 tablet    Take 1 tablet (250 mg) by mouth daily       loratadine 10 MG tablet    CLARITIN     Take 10 mg by mouth daily as needed. For head cold       nystatin 469020 UNIT/ML suspension    MYCOSTATIN    280 mL    Take 5 mLs (500,000 Units) by mouth 4 times daily Swish and spit.       order for DME     1 Box    Equipment being ordered: knee high compression stockings- 18-20 mm       oxyCODONE 5 MG IR tablet    ROXICODONE    50 tablet    Take 1 tablet (5 mg) by mouth every 6 hours as needed for moderate to severe pain       pantoprazole 40 MG EC tablet    PROTONIX    30 tablet    Take 1 tablet (40 mg) by mouth every morning       polyethylene glycol Packet    MIRALAX/GLYCOLAX    14 packet    Take 17 g by mouth daily . If you start to have loose stools/diarrhea or multiple bowel movements, ok to hold this medication. Then resume if no bowel movement after 24-48hours.       predniSONE 10 MG tablet    DELTASONE    180 tablet    Take 2 tablets (20 mg) by mouth daily Or as directed       SENNA S 8.6-50 MG per tablet   Generic drug:  senna-docusate     100 tablet    Can take 1-2 tablets up to twice a day. Can start with 1-2 tablets daily. If no bowel movement within 24 hours, can take 1-2 tablets twice a day. HOLD if you develop diarrhea. Resume taking if no bowel movement in 24 hours.       TYLENOL PO      Take 325 mg by mouth every 6 hours as needed for mild pain or fever

## 2017-03-15 NOTE — PROGRESS NOTES
Infusion Nursing Note:  Bonny Raymundo presents today for labs and possible blood transfusions.    Patient seen by provider today: No   present during visit today: Not Applicable.    Note: N/A.    Intravenous Access:  PICC.    Treatment Conditions:  Lab Results   Component Value Date    HGB 8.8 03/15/2017     Lab Results   Component Value Date    WBC 3.4 03/15/2017      Lab Results   Component Value Date    ANEU 1.4 03/15/2017     Lab Results   Component Value Date    PLT 19 03/15/2017      Results reviewed, labs MET treatment parameters, ok to proceed with treatment.  Blood transfusion consent signed 1/9/17      Post Infusion Assessment:  Patient tolerated infusion without incident.  Blood return noted pre and post infusion.  Site patent and intact, free from redness, edema or discomfort.  No evidence of extravasations.    Discharge Plan:   [facility]  Patient will return 3/19/17 for next appointment.   Patient discharged in stable condition accompanied by: self.  Departure Mode: Ambulatory.    James Parikh RN

## 2017-03-16 LAB
CYCLOSPORINE BLD LC/MS/MS-MCNC: 236 UG/L (ref 50–400)
TME LAST DOSE: NORMAL H

## 2017-03-17 ENCOUNTER — ONCOLOGY VISIT (OUTPATIENT)
Dept: ONCOLOGY | Facility: CLINIC | Age: 72
End: 2017-03-17
Attending: INTERNAL MEDICINE
Payer: COMMERCIAL

## 2017-03-17 VITALS
SYSTOLIC BLOOD PRESSURE: 147 MMHG | TEMPERATURE: 98.1 F | OXYGEN SATURATION: 98 % | DIASTOLIC BLOOD PRESSURE: 82 MMHG | RESPIRATION RATE: 19 BRPM | HEART RATE: 104 BPM | HEIGHT: 63 IN

## 2017-03-17 DIAGNOSIS — K21.9 GASTROESOPHAGEAL REFLUX DISEASE WITHOUT ESOPHAGITIS: ICD-10-CM

## 2017-03-17 PROCEDURE — 99214 OFFICE O/P EST MOD 30 MIN: CPT | Mod: ZP | Performed by: PHYSICIAN ASSISTANT

## 2017-03-17 PROCEDURE — 99212 OFFICE O/P EST SF 10 MIN: CPT | Mod: ZF

## 2017-03-17 RX ORDER — PANTOPRAZOLE SODIUM 40 MG/1
40 TABLET, DELAYED RELEASE ORAL EVERY MORNING
Qty: 30 TABLET | Refills: 1 | Status: SHIPPED | OUTPATIENT
Start: 2017-03-17 | End: 2017-05-04

## 2017-03-17 ASSESSMENT — PAIN SCALES - GENERAL: PAINLEVEL: NO PAIN (0)

## 2017-03-17 NOTE — Clinical Note
Is someone still working on this?  I see its not totally finsihed.  Please add Pentamadine inhalation on to my appt on 4/3.

## 2017-03-17 NOTE — NURSING NOTE
"Bonny Raymundo is a 71 year old female who presents for:  Chief Complaint   Patient presents with     Oncology Clinic Visit     Aplastic anemia        Initial Vitals:  /82  Pulse 104  Temp 98.1  F (36.7  C) (Oral)  Resp 19  Ht 1.6 m (5' 3\")  SpO2 98% Estimated body mass index is 23.16 kg/(m^2) as calculated from the following:    Height as of 1/23/17: 1.6 m (5' 3\").    Weight as of 3/9/17: 59.3 kg (130 lb 11.7 oz).. There is no height or weight on file to calculate BSA. BP completed using cuff size: regular  No Pain (0) No LMP recorded. Patient has had a hysterectomy. Allergies and medications reviewed.     Medications: Medication refills not needed today.  Pharmacy name entered into EPIC:    [facility] [place], [place] - [address]  [facility], [place] - [address]    Comments: patient declined most of rooming process. Patient did allow me to take bp, p, r, spo2, and temp.    5 minutes for nursing intake (face to face time)   Curt Martinez CMA        "

## 2017-03-17 NOTE — MR AVS SNAPSHOT
After Visit Summary   3/17/2017    Bonny Raymundo    MRN: [id number]           Patient Information     Date Of Birth          1945        Visit Information        Provider Department      3/17/2017 12:30 PM Celine Walls PA-C [facility]        Today's Diagnoses     Gastroesophageal reflux disease without esophagitis           Follow-ups after your visit        Your next 10 appointments already scheduled     Mar 22, 2017 10:00 AM CDT   Level 5 with SH INFUSION CHAIR 6   [facility] ([facility])    [facility]  [address]  [place] [address]   [phone]            Mar 26, 2017  8:30 AM CDT   Level 5 with SH INFUSION CHAIR 12   [facility] ([facility])    [facility] [place]  [address]  [place] [address]   [phone]            Mar 29, 2017  9:00 AM CDT   Level 5 with SH INFUSION CHAIR 17   [facility] ([facility])    [facility] [place]  [address]  [place] [address]   [phone]              Who to contact     If you have questions or need follow up information about today's clinic visit or your schedule please contact [facility] directly at [phone].  Normal or non-critical lab and imaging results will be communicated to you by MyChart, letter or phone within 4 business days after the clinic has received the results. If you do not hear from us within 7 days, please contact the clinic through MyChart or phone. If you have a critical or abnormal lab result, we will notify you by phone as soon as possible.  Submit refill requests through [employer] or call your pharmacy and they will forward the refill request to us. Please allow 3 business days for your refill to be completed.          Additional Information  "About Your Visit        MyChart Information     Lendinero lets you send messages to your doctor, view your test results, renew your prescriptions, schedule appointments and more. To sign up, go to [email or web address] . Click on \"Log in\" on the left side of the screen, which will take you to the Welcome page. Then click on \"Sign up Now\" on the right side of the page.     You will be asked to enter the access code listed below, as well as some personal information. Please follow the directions to create your username and password.     Your access code is: [email or web address]  Expires: 2017  1:59 PM     Your access code will  in 90 days. If you need help or a new code, please call your [place] clinic or [phone].        Care EveryWhere ID     This is your Care EveryWhere ID. This could be used by other organizations to access your [place] medical records  [id number]        Your Vitals Were     Pulse Temperature Respirations Height Pulse Oximetry       104 98.1  F (36.7  C) (Oral) 19 1.6 m (5' 3\") 98%        Blood Pressure from Last 3 Encounters:   17 114/75   17 147/82   03/15/17 134/89    Weight from Last 3 Encounters:   17 59.3 kg (130 lb 11.7 oz)   17 59.7 kg (131 lb 11.2 oz)   17 59.7 kg (131 lb 11.2 oz)              Today, you had the following     No orders found for display         Where to get your medicines      These medications were sent to [facility] [place], [place] - [address]  [address] , [facility] [address]    Hours:  TRANSPLANT PHONE NUMBER [phone] Phone:  [phone]     pantoprazole 40 MG EC tablet          Primary Care Provider Office Phone # Fax #    Rafita Rogel [phone]        PHYSICIANS [address] [facility] [address]  [facility] [address]        Thank you!     Thank you for choosing [facility]  for your care. Our goal is always to provide you " with excellent care. Hearing back from our patients is one way we can continue to improve our services. Please take a few minutes to complete the written survey that you may receive in the mail after your visit with us. Thank you!             Your Updated Medication List - Protect others around you: Learn how to safely use, store and throw away your medicines at www.disposemymeds.org.          This list is accurate as of: 3/17/17 11:59 PM.  Always use your most recent med list.                   Brand Name Dispense Instructions for use    BENADRYL ALLERGY 25 MG tablet   Generic drug:  diphenhydrAMINE     56 tablet    Take 1 tablet (25 mg) by mouth nightly as needed       chlorpheniramine 4 MG tablet    CHLOR-TRIMETON     Take 4 mg by mouth every 6 hours as needed. For head cold       COMPRESSION STOCKINGS     2 each    Wear compression stockings at 20-30 mmHg rating most time during the day to the affected leg (left leg) or both legs. Take them off at night.       cycloSPORINE modified 25 MG capsule    GENERIC EQUIVALENT    300 capsule    Take 3 capsules (75 mg) by mouth 2 times daily       diclofenac 1 % Gel topical gel    VOLTAREN    100 g    Apply 2 g topically 2 times daily       eltrombopag 50 MG tablet    PROMACTA    180 tablet    Take 3 tablets (150 mg) by mouth daily Administer on an empty stomach, 1 hour before or 2 hours after a meal. Or as directed       fluconazole 200 MG tablet    DIFLUCAN    30 tablet    Take 1 tablet (200 mg) by mouth daily       LEVAQUIN 250 MG tablet   Generic drug:  levofloxacin     30 tablet    Take 1 tablet (250 mg) by mouth daily       loratadine 10 MG tablet    CLARITIN     Take 10 mg by mouth daily as needed. For head cold       nystatin [id number] UNIT/ML suspension    MYCOSTATIN    280 mL    Take 5 mLs (500,000 Units) by mouth 4 times daily Swish and spit.       order for DME     1 Box    Equipment being ordered: knee high compression stockings- 18-20 mm       oxyCODONE 5 MG IR  tablet    ROXICODONE    50 tablet    Take 1 tablet (5 mg) by mouth every 6 hours as needed for moderate to severe pain       pantoprazole 40 MG EC tablet    PROTONIX    30 tablet    Take 1 tablet (40 mg) by mouth every morning       polyethylene glycol Packet    MIRALAX/GLYCOLAX    14 packet    Take 17 g by mouth daily . If you start to have loose stools/diarrhea or multiple bowel movements, ok to hold this medication. Then resume if no bowel movement after 24-48hours.       predniSONE 10 MG tablet    DELTASONE    180 tablet    Take 2 tablets (20 mg) by mouth daily Or as directed       SENNA S 8.6-50 MG per tablet   Generic drug:  senna-docusate     100 tablet    Can take 1-2 tablets up to twice a day. Can start with 1-2 tablets daily. If no bowel movement within 24 hours, can take 1-2 tablets twice a day. HOLD if you develop diarrhea. Resume taking if no bowel movement in 24 hours.       TYLENOL PO      Take 325 mg by mouth every 6 hours as needed for mild pain or fever

## 2017-03-19 ENCOUNTER — HOSPITAL ENCOUNTER (OUTPATIENT)
Facility: CLINIC | Age: 72
Setting detail: SPECIMEN
Discharge: HOME | End: 2017-03-19
Attending: INTERNAL MEDICINE | Admitting: INTERNAL MEDICINE
Payer: COMMERCIAL

## 2017-03-19 ENCOUNTER — INFUSION THERAPY VISIT (OUTPATIENT)
Dept: INFUSION THERAPY | Facility: CLINIC | Age: 72
End: 2017-03-19
Attending: INTERNAL MEDICINE
Payer: COMMERCIAL

## 2017-03-19 VITALS — TEMPERATURE: 97.1 F | SYSTOLIC BLOOD PRESSURE: 114 MMHG | DIASTOLIC BLOOD PRESSURE: 75 MMHG | HEART RATE: 93 BPM

## 2017-03-19 DIAGNOSIS — D61.3 IDIOPATHIC APLASTIC ANEMIA (H): ICD-10-CM

## 2017-03-19 DIAGNOSIS — D61.818 PANCYTOPENIA (H): Primary | ICD-10-CM

## 2017-03-19 LAB
BASOPHILS # BLD AUTO: 0 10E9/L (ref 0–0.2)
BASOPHILS NFR BLD AUTO: 0 %
DIFFERENTIAL METHOD BLD: ABNORMAL
EOSINOPHIL # BLD AUTO: 0 10E9/L (ref 0–0.7)
EOSINOPHIL NFR BLD AUTO: 0 %
ERYTHROCYTE [DISTWIDTH] IN BLOOD BY AUTOMATED COUNT: 22.7 % (ref 10–15)
HCT VFR BLD AUTO: 26.3 % (ref 35–47)
HGB BLD-MCNC: 9 G/DL (ref 11.7–15.7)
LYMPHOCYTES # BLD AUTO: 2.9 10E9/L (ref 0.8–5.3)
LYMPHOCYTES NFR BLD AUTO: 56 %
MCH RBC QN AUTO: 32.1 PG (ref 26.5–33)
MCHC RBC AUTO-ENTMCNC: 34.2 G/DL (ref 31.5–36.5)
MCV RBC AUTO: 94 FL (ref 78–100)
MONOCYTES # BLD AUTO: 0.3 10E9/L (ref 0–1.3)
MONOCYTES NFR BLD AUTO: 5 %
NEUTROPHILS # BLD AUTO: 2 10E9/L (ref 1.6–8.3)
NEUTROPHILS NFR BLD AUTO: 39 %
NRBC # BLD AUTO: 0.4 10*3/UL
NRBC BLD AUTO-RTO: 7 /100
PLATELET # BLD AUTO: 40 10E9/L (ref 150–450)
RBC # BLD AUTO: 2.8 10E12/L (ref 3.8–5.2)
WBC # BLD AUTO: 5.2 10E9/L (ref 4–11)

## 2017-03-19 PROCEDURE — 85025 COMPLETE CBC W/AUTO DIFF WBC: CPT | Performed by: INTERNAL MEDICINE

## 2017-03-19 PROCEDURE — 36592 COLLECT BLOOD FROM PICC: CPT

## 2017-03-19 ASSESSMENT — PAIN SCALES - GENERAL: PAINLEVEL: NO PAIN (0)

## 2017-03-19 NOTE — MR AVS SNAPSHOT
"              After Visit Summary   3/19/2017    Bonny Raymundo    MRN: [id number]           Patient Information     Date Of Birth          1945        Visit Information        Provider Department      3/19/2017 8:00 AM  INFUSION CHAIR 13 [facility]        Today's Diagnoses     Pancytopenia (H)    -  1    Idiopathic aplastic anemia (H)           Follow-ups after your visit        Your next 10 appointments already scheduled     Mar 22, 2017 10:00 AM CDT   Level 5 with  INFUSION CHAIR 6   [facility] ([facility])    [facility]  [address]  [facility] [address] [phone]              Who to contact     If you have questions or need follow up information about today's clinic visit or your schedule please contact [facility] directly at [phone].  Normal or non-critical lab and imaging results will be communicated to you by Coqueluxhart, letter or phone within 4 business days after the clinic has received the results. If you do not hear from us within 7 days, please contact the clinic through Coqueluxhart or phone. If you have a critical or abnormal lab result, we will notify you by phone as soon as possible.  Submit refill requests through Mengero or call your pharmacy and they will forward the refill request to us. Please allow 3 business days for your refill to be completed.          Additional Information About Your Visit        MyChart Information     Mengero lets you send messages to your doctor, view your test results, renew your prescriptions, schedule appointments and more. To sign up, go to [email or web address] . Click on \"Log in\" on the left side of the screen, which will take you to the Welcome page. Then click on \"Sign up Now\" on the right side of the page.     You will be asked to enter the access code listed below, as well as some personal " information. Please follow the directions to create your username and password.     Your access code is: [email or web address]  Expires: 2017  1:59 PM     Your access code will  in 90 days. If you need help or a new code, please call your [place] clinic or [phone].        Care EveryWhere ID     This is your Care EveryWhere ID. This could be used by other organizations to access your [place] medical records  [id number]        Your Vitals Were     Pulse Temperature                93 97.1  F (36.2  C) (Oral)           Blood Pressure from Last 3 Encounters:   17 114/75   17 147/82   03/15/17 134/89    Weight from Last 3 Encounters:   17 59.3 kg (130 lb 11.7 oz)   17 59.7 kg (131 lb 11.2 oz)   17 59.7 kg (131 lb 11.2 oz)              We Performed the Following     CBC with platelets differential        Primary Care Provider Office Phone # Fax #    Rafita Rogel [phone]        PHYSICIANS [address]  [facility] [address]        Thank you!     Thank you for choosing [facility]  for your care. Our goal is always to provide you with excellent care. Hearing back from our patients is one way we can continue to improve our services. Please take a few minutes to complete the written survey that you may receive in the mail after your visit with us. Thank you!             Your Updated Medication List - Protect others around you: Learn how to safely use, store and throw away your medicines at www.disposemymeds.org.          This list is accurate as of: 3/19/17  8:45 AM.  Always use your most recent med list.                   Brand Name Dispense Instructions for use    BENADRYL ALLERGY 25 MG tablet   Generic drug:  diphenhydrAMINE     56 tablet    Take 1 tablet (25 mg) by mouth nightly as needed       chlorpheniramine 4 MG tablet    CHLOR-TRIMETON     Take 4 mg by mouth every 6 hours as needed. For head cold        COMPRESSION STOCKINGS     2 each    Wear compression stockings at 20-30 mmHg rating most time during the day to the affected leg (left leg) or both legs. Take them off at night.       cycloSPORINE modified 25 MG capsule    GENERIC EQUIVALENT    300 capsule    Take 3 capsules (75 mg) by mouth 2 times daily       diclofenac 1 % Gel topical gel    VOLTAREN    100 g    Apply 2 g topically 2 times daily       eltrombopag 50 MG tablet    PROMACTA    180 tablet    Take 3 tablets (150 mg) by mouth daily Administer on an empty stomach, 1 hour before or 2 hours after a meal. Or as directed       fluconazole 200 MG tablet    DIFLUCAN    30 tablet    Take 1 tablet (200 mg) by mouth daily       LEVAQUIN 250 MG tablet   Generic drug:  levofloxacin     30 tablet    Take 1 tablet (250 mg) by mouth daily       loratadine 10 MG tablet    CLARITIN     Take 10 mg by mouth daily as needed. For head cold       nystatin [id number] UNIT/ML suspension    MYCOSTATIN    280 mL    Take 5 mLs (500,000 Units) by mouth 4 times daily Swish and spit.       order for DME     1 Box    Equipment being ordered: knee high compression stockings- 18-20 mm       oxyCODONE 5 MG IR tablet    ROXICODONE    50 tablet    Take 1 tablet (5 mg) by mouth every 6 hours as needed for moderate to severe pain       pantoprazole 40 MG EC tablet    PROTONIX    30 tablet    Take 1 tablet (40 mg) by mouth every morning       polyethylene glycol Packet    MIRALAX/GLYCOLAX    14 packet    Take 17 g by mouth daily . If you start to have loose stools/diarrhea or multiple bowel movements, ok to hold this medication. Then resume if no bowel movement after 24-48hours.       predniSONE 10 MG tablet    DELTASONE    180 tablet    Take 2 tablets (20 mg) by mouth daily Or as directed       SENNA S 8.6-50 MG per tablet   Generic drug:  senna-docusate     100 tablet    Can take 1-2 tablets up to twice a day. Can start with 1-2 tablets daily. If no bowel movement within 24 hours, can take  1-2 tablets twice a day. HOLD if you develop diarrhea. Resume taking if no bowel movement in 24 hours.       TYLENOL PO      Take 325 mg by mouth every 6 hours as needed for mild pain or fever

## 2017-03-19 NOTE — PROGRESS NOTES
Infusion Nursing Note:  Bonny Raymundo presents today for labs and possible tx.    Patient seen by provider today: No   present during visit today: Not Applicable.    Note: N/A.    Intravenous Access:  Labs drawn without difficulty.  PICC.    Treatment Conditions:  Lab Results   Component Value Date    HGB 9.0 03/19/2017     Lab Results   Component Value Date    WBC 5.2 03/19/2017      Lab Results   Component Value Date    ANEU 1.4 03/15/2017     Lab Results   Component Value Date    PLT 40 03/19/2017      Results reviewed, labs did NOT meet treatment parameters: Hgb 9.0 and Plts 40.      Post Infusion Assessment:  Blood return noted pre and post infusion.  Site patent and intact, free from redness, edema or discomfort.  No evidence of extravasations.  Access discontinued per protocol.    Discharge Plan:   Copy of AVS reviewed with patient and/or family.  Patient will return 3/22/17 for next appointment.  Patient discharged in stable condition accompanied by: self.  Departure Mode: Ambulatory.    Guerline Robles RN

## 2017-03-20 NOTE — PROGRESS NOTES
[facility] CANCER CLINIC  FOLLOW-UP VISIT NOTE  Date of visit:   3/17/2017        REASON FOR VISIT: Aplastic anemia, weekly follow up    HPI: Bonny is a 71 year old female who presented in the fall of 2016 with fatigue and shortness of breath. 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  Her counts showed pancytopenia and BMBX was concerning for aplastic anemia.  By December of 2016 she was transfusion dependent with platelets under 10 k and ANC dropped under 500. 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  She has met with Dr Mcdaniel at [facility] and consulted with Dr Rogel at [facility].      After further consultation it was decided to admit on 1/9/17 for ATG/cyclosporine/prednisone therapy.     The following was her inpatient regimen:  Day 1= 1/9/17  ATG 2500 mg (40 mg/kg0 q24 hours D-4)  Cyclosporine 200 mg (3 mg/kg) PO bid  Eltrombopag 50 mg daily  Methylpred 31 mg (0.5 mg/kg) q24 hours D1-4  Prednisone 60 mg (1 mg/kg) daily after completion of IV methylpred to continue for at least 2 weeks    Bonny also had a admission 1/23-1/25/17 for rectal bleeding (presumed hemorrhoidal).  See DC summary for details.     INTERVAL HISTORY: Bonny is here today for her weekly follow up. Bonny feels that she is feeling the same as she has been. She has had OCASIO for the last few weeks. She continues to have a dry mouth and reports she is drinking about 1 liter of fluids a day. She continues to eat good for meals and family/friends continue to bring her food. Her low back pain is the same as last week. Bonny is using heat, tylenol (2-3 times a day) and oxycodone (0-2 times a day) which she feels is manageable. No fevers, chest pain and and no bleeding. Daily BM, no black/blood. No  issues. She feels she is moving around better and that overall her fatigue must be less because she is doing more.  She still feels her breathing is short with activity and  "that blood doesn't really help that very much any more.     ROS: complete review of systems was performed which was negative unless noted above.    EXAM:  /82  Pulse 104  Temp 98.1  F (36.7  C) (Oral)  Resp 19  Ht 1.6 m (5' 3\")  SpO2 98%  Wt Readings from Last 4 Encounters:   03/09/17 59.3 kg (130 lb 11.7 oz)   03/03/17 59.7 kg (131 lb 11.2 oz)   03/03/17 59.7 kg (131 lb 11.2 oz)   02/24/17 60.3 kg (133 lb)     General: Patient alert and oriented x3.    EYES: PERRLA, conjunctiva pink, sclera is jaundice.   Neck: No palpable cervical, supraclavicular or axillary nodes bilaterally.   Cardiovascular: RRR, no murmurs, gallops or rubs  Respiratory: clear to auscultation bilaterally;  no crackles, rhonchi or wheezing.   Abdomen: positive bowel sounds in all four quadrants, soft, nontender  Skin: light jaundice, intact  Neuro: grossly intact.   Mood and affect is stable.       LABS: no labs today      ASSESSMENT/PLAN: 71 year old female with AA, currently pancytopenic 2/2 to disease and further worsening from recent therapy    HEME- Treatment for AA 1/9-1/13/17 with ATG, methylpred, cyclosporine and eltrombopag.   -cyclosporine 75 mg BID (decreased 3/9)  Continue weekly checks, keep level between 200-350. Next check 3/21 at [facility].   -continue 20 mg Prednisone 3/10-3/24. Will decrease by 10 mg and continue EVERY OTHER WEEK (next change 3/24)  -eltrombopag 150 mg daily  -Will need transfusion support 2 x week, worked on schedule today mostly at [facility] (Sun + Wed at [facility])  -transfuse if hgb <8 and platelets <30k     CV-two prior provoked DVT in 2012 and 2014 with travel. 2012 was associated with PE. Was on warfarin/lovenox in the past.  Transitioned to ASA which has been held in the setting of low platelets  -Norvasc stopped 1/25, BP stable    ID-  No fever or symptoms of infection. If ANC stays above 1.0 in the next couple weeks will go off ppx  -continue flucon 200 mg daily  - levaquin 250 mg daily   - CMV was neg  - inh " pentamidine done today 2/24/17, next 3/24/17    MSK- low back -Low thoracic mid spine pain.  Last CXR showed compression fractures in her vertebral bodies.  Need to get some further imaging at some point.  For now continue prn tylenol and oxycodone    FEN: weight stable.  Eating OK, encouraged 1 + L hydration daily    GI: mild constipation, continue alternating senna + miralax.   -bili elevation, hemolysis was r/o, its all unconjugated- started with therapy likely 2/2 to csa   -continue ppx protonix     Sejal Walls PA-C

## 2017-03-22 ENCOUNTER — INFUSION THERAPY VISIT (OUTPATIENT)
Dept: INFUSION THERAPY | Facility: CLINIC | Age: 72
End: 2017-03-22
Attending: INTERNAL MEDICINE
Payer: COMMERCIAL

## 2017-03-22 ENCOUNTER — CARE COORDINATION (OUTPATIENT)
Dept: ONCOLOGY | Facility: CLINIC | Age: 72
End: 2017-03-22

## 2017-03-22 ENCOUNTER — HOSPITAL ENCOUNTER (OUTPATIENT)
Facility: CLINIC | Age: 72
Setting detail: SPECIMEN
Discharge: HOME | End: 2017-03-22
Attending: INTERNAL MEDICINE | Admitting: INTERNAL MEDICINE
Payer: COMMERCIAL

## 2017-03-22 VITALS
TEMPERATURE: 98.2 F | DIASTOLIC BLOOD PRESSURE: 84 MMHG | SYSTOLIC BLOOD PRESSURE: 132 MMHG | RESPIRATION RATE: 18 BRPM | HEART RATE: 76 BPM

## 2017-03-22 DIAGNOSIS — D61.3 IDIOPATHIC APLASTIC ANEMIA (H): ICD-10-CM

## 2017-03-22 DIAGNOSIS — D61.3 IDIOPATHIC APLASTIC ANEMIA (H): Primary | ICD-10-CM

## 2017-03-22 DIAGNOSIS — D61.818 PANCYTOPENIA (H): Primary | ICD-10-CM

## 2017-03-22 LAB
ALBUMIN SERPL-MCNC: 3.4 G/DL (ref 3.4–5)
ALP SERPL-CCNC: 69 U/L (ref 40–150)
ALT SERPL W P-5'-P-CCNC: 29 U/L (ref 0–50)
ANION GAP SERPL CALCULATED.3IONS-SCNC: 11 MMOL/L (ref 3–14)
AST SERPL W P-5'-P-CCNC: 11 U/L (ref 0–45)
BASOPHILS # BLD AUTO: 0 10E9/L (ref 0–0.2)
BASOPHILS NFR BLD AUTO: 0 %
BILIRUB SERPL-MCNC: 2.2 MG/DL (ref 0.2–1.3)
BLD PROD TYP BPU: NORMAL
BLD PROD TYP BPU: NORMAL
BLD UNIT ID BPU: 0
BLOOD PRODUCT CODE: NORMAL
BPU ID: NORMAL
BUN SERPL-MCNC: 50 MG/DL (ref 7–30)
CALCIUM SERPL-MCNC: 8.6 MG/DL (ref 8.5–10.1)
CHLORIDE SERPL-SCNC: 105 MMOL/L (ref 94–109)
CO2 SERPL-SCNC: 24 MMOL/L (ref 20–32)
CREAT SERPL-MCNC: 1.27 MG/DL (ref 0.52–1.04)
CYCLOSPORINE BLD LC/MS/MS-MCNC: 248 UG/L (ref 50–400)
DIFFERENTIAL METHOD BLD: ABNORMAL
EOSINOPHIL # BLD AUTO: 0 10E9/L (ref 0–0.7)
EOSINOPHIL NFR BLD AUTO: 0 %
ERYTHROCYTE [DISTWIDTH] IN BLOOD BY AUTOMATED COUNT: 24.6 % (ref 10–15)
GFR SERPL CREATININE-BSD FRML MDRD: 41 ML/MIN/1.7M2
GLUCOSE SERPL-MCNC: 106 MG/DL (ref 70–99)
HCT VFR BLD AUTO: 24.6 % (ref 35–47)
HGB BLD-MCNC: 8.3 G/DL (ref 11.7–15.7)
LYMPHOCYTES # BLD AUTO: 2.1 10E9/L (ref 0.8–5.3)
LYMPHOCYTES NFR BLD AUTO: 50 %
MCH RBC QN AUTO: 31.9 PG (ref 26.5–33)
MCHC RBC AUTO-ENTMCNC: 33.7 G/DL (ref 31.5–36.5)
MCV RBC AUTO: 95 FL (ref 78–100)
MONOCYTES # BLD AUTO: 0.2 10E9/L (ref 0–1.3)
MONOCYTES NFR BLD AUTO: 5 %
NEUTROPHILS # BLD AUTO: 1.8 10E9/L (ref 1.6–8.3)
NEUTROPHILS NFR BLD AUTO: 45 %
NRBC # BLD AUTO: 0.2 10*3/UL
NRBC BLD AUTO-RTO: 4 /100
NUM BPU REQUESTED: 1
PLATELET # BLD AUTO: 23 10E9/L (ref 150–450)
POIKILOCYTOSIS BLD QL SMEAR: SLIGHT
POLYCHROMASIA BLD QL SMEAR: SLIGHT
POTASSIUM SERPL-SCNC: 4 MMOL/L (ref 3.4–5.3)
PROT SERPL-MCNC: 6.8 G/DL (ref 6.8–8.8)
RBC # BLD AUTO: 2.6 10E12/L (ref 3.8–5.2)
SODIUM SERPL-SCNC: 140 MMOL/L (ref 133–144)
TME LAST DOSE: NORMAL H
TRANSFUSION STATUS PATIENT QL: NORMAL
TRANSFUSION STATUS PATIENT QL: NORMAL
WBC # BLD AUTO: 4.1 10E9/L (ref 4–11)

## 2017-03-22 PROCEDURE — 80158 DRUG ASSAY CYCLOSPORINE: CPT | Performed by: INTERNAL MEDICINE

## 2017-03-22 PROCEDURE — P9037 PLATE PHERES LEUKOREDU IRRAD: HCPCS | Performed by: PHYSICIAN ASSISTANT

## 2017-03-22 PROCEDURE — 36430 TRANSFUSION BLD/BLD COMPNT: CPT

## 2017-03-22 PROCEDURE — 85025 COMPLETE CBC W/AUTO DIFF WBC: CPT | Performed by: INTERNAL MEDICINE

## 2017-03-22 PROCEDURE — 80053 COMPREHEN METABOLIC PANEL: CPT | Performed by: INTERNAL MEDICINE

## 2017-03-22 PROCEDURE — 36415 COLL VENOUS BLD VENIPUNCTURE: CPT

## 2017-03-22 RX ORDER — PREDNISONE 10 MG/1
10 TABLET ORAL DAILY
Qty: 30 TABLET | Refills: 0 | Status: SHIPPED | OUTPATIENT
Start: 2017-03-24 | End: 2017-04-23

## 2017-03-22 NOTE — PROGRESS NOTES
Infusion Nursing Note:  Bonny Olsondon presents today for Labs/poss. tx.    Patient seen by provider today: No   present during visit today: Not Applicable.    Note: N/A.    Intravenous Access:  Lab draw site right ac, Needle type BF, Gauge 23.  Labs drawn without difficulty.  PICC.    Treatment Conditions:  Lab Results   Component Value Date    HGB 8.3 03/22/2017     Lab Results   Component Value Date    WBC 4.1 03/22/2017      Lab Results   Component Value Date    ANEU 1.8 03/22/2017     Lab Results   Component Value Date    PLT 23 03/22/2017      Lab Results   Component Value Date     03/22/2017                   Lab Results   Component Value Date    POTASSIUM 4.0 03/22/2017           Lab Results   Component Value Date    MAG 1.4 01/25/2017            Lab Results   Component Value Date    CR 1.27 03/22/2017                   Lab Results   Component Value Date    LEO 8.6 03/22/2017                Lab Results   Component Value Date    BILITOTAL 2.2 03/22/2017           Lab Results   Component Value Date    ALBUMIN 3.4 03/22/2017                    Lab Results   Component Value Date    ALT 29 03/22/2017           Lab Results   Component Value Date    AST 11 03/22/2017     Blood transfusion consent signed 10/6/16.  Pt will receive 1 dose of platelets today for platelet count of 23,000.      Post Infusion Assessment:  Patient tolerated infusion without incident.  Blood return noted pre and post infusion.  Site patent and intact, free from redness, edema or discomfort.  No evidence of extravasations.    Discharge Plan:   Discharge instructions reviewed with: Patient.  Patient and/or family verbalized understanding of discharge instructions and all questions answered.  Copy of AVS reviewed with patient and/or family.  Patient will return 3/26/17 for next appointment.  Patient discharged in stable condition accompanied by: self.  Departure Mode: Ambulatory.    Shant Nieto RN

## 2017-03-22 NOTE — MR AVS SNAPSHOT
After Visit Summary   3/22/2017    Bonny Raymundo    MRN: [id number]           Patient Information     Date Of Birth          1945        Visit Information        Provider Department      3/22/2017 10:00 AM  INFUSION CHAIR 6 [facility]        Today's Diagnoses     Pancytopenia (H)    -  1    Idiopathic aplastic anemia (H)           Follow-ups after your visit        Your next 10 appointments already scheduled     Mar 26, 2017  8:30 AM CDT   Level 5 with  INFUSION CHAIR 12   [facility]fusion [facility] ([facility])    [facility] [place]  [address]  [place] [address]   [phone]            Mar 29, 2017  9:00 AM CDT   Level 5 with  INFUSION CHAIR 17   [facility]fusion [facility] ([facility])    [facility] [place]  [address]  [place] [address]   [phone]            Apr 02, 2017  8:30 AM CDT   Level 5 with  INFUSION CHAIR 16   [facility] ([facility])    [facility] [place]  [address]  [place] [address]   [phone]            Apr 04, 2017  7:15 AM CDT   [facility] Lab Draw with  [facility] LAB DRAW   [facility] Draw ([facility])    [address]  2nd [facility] [id number] [phone]            Apr 04, 2017  7:50 AM CDT   (Arrive by 7:35 AM)   Return Visit with Celine Walls PA-C   [facility] ([facility])    [address]  2nd Floor  [facility] [id number] [phone]            Apr 05, 2017  8:00 AM CDT   Level 5 with  INFUSION CHAIR 15   [facility] ([facility])    [facility] [place]  [address]  [place] [address]   [phone]            Apr 09, 2017  8:30  AM CDT   Level 5 with  INFUSION CHAIR 14   [facility] ([facility])    [facility] [place] [address]  [place] [address]   [phone]            Apr 12, 2017  8:30 AM CDT   Level 5 with  INFUSION CHAIR 3   [facility] ([facility])    [facility] [place]  [address]  [place] [address]   [phone]            Apr 21, 2017  9:45 AM CDT   [facility] Lab Draw with  MASONIC LAB DRAW   [facility] Lab Draw ([facility])    [address]  2nd [facility] [id number] [phone]            Apr 21, 2017 10:20 AM CDT   (Arrive by 10:05 AM)   Return Visit with Celine Walls PA-C   [facility] ([facility])    [address] [place] [id number] [phone]              Future tests that were ordered for you today     Open Standing Orders        Priority Remaining Interval Expires Ordered    Platelets prepare order unit Routine 98/100 CONDITIONAL (SPECIFY) BLOOD  3/22/2017    Transfuse platelets unit Routine 99/100 TRANSFUSE 1 DOSE  3/22/2017            Who to contact     If you have questions or need follow up information about today's clinic visit or your schedule please contact [facility] directly at [phone].  Normal or non-critical lab and imaging results will be communicated to you by Browsterhart, letter or phone within 4 business days after the clinic has received the results. If you do not hear from us within 7 days, please contact the clinic through Browster[employer] or phone. If you have a critical or abnormal lab result, we will notify you by phone as soon as possible.  Submit refill requests through [employer] or call your pharmacy and they will forward the refill request to us. Please allow 3 business days for your  "refill to be completed.          Additional Information About Your Visit        Guroo Information     Guroo lets you send messages to your doctor, view your test results, renew your prescriptions, schedule appointments and more. To sign up, go to [email or web address] . Click on \"Log in\" on the left side of the screen, which will take you to the Welcome page. Then click on \"Sign up Now\" on the right side of the page.     You will be asked to enter the access code listed below, as well as some personal information. Please follow the directions to create your username and password.     Your access code is: [email or web address]  Expires: 2017  1:59 PM     Your access code will  in 90 days. If you need help or a new code, please call your [facility] clinic or [phone].        Care EveryWhere ID     This is your Care EveryWhere ID. This could be used by other organizations to access your [facility] medical records  [id number]        Your Vitals Were     Pulse Temperature Respirations             76 98.2  F (36.8  C) (Oral) 18          Blood Pressure from Last 3 Encounters:   17 132/84   17 114/75   17 147/82    Weight from Last 3 Encounters:   17 59.3 kg (130 lb 11.7 oz)   17 59.7 kg (131 lb 11.2 oz)   17 59.7 kg (131 lb 11.2 oz)              We Performed the Following     Blood component     CBC with platelets differential     Comprehensive metabolic panel     Cyclosporine     Obtain affirmation of informed consent     Platelets prepare order unit     Platelets prepare order unit     Transfuse platelets unit          Today's Medication Changes          These changes are accurate as of: 3/22/17  3:28 PM.  If you have any questions, ask your nurse or doctor.               These medicines have changed or have updated prescriptions.        Dose/Directions    * predniSONE 10 MG tablet   Commonly known as:  DELTASONE   This may have changed:  Another medication with the same name " was added. Make sure you understand how and when to take each.   Used for:  Pancytopenia (H), Idiopathic aplastic anemia (H)   Changed by:  Rafita Rogel MD        Dose:  20 mg   Take 2 tablets (20 mg) by mouth daily Or as directed   Quantity:  180 tablet   Refills:  3       * predniSONE 10 MG tablet   Commonly known as:  DELTASONE   This may have changed:  You were already taking a medication with the same name, and this prescription was added. Make sure you understand how and when to take each.   Used for:  Idiopathic aplastic anemia (H)   Changed by:  Светлана Nolasco RN        Dose:  10 mg   Start taking on:  3/24/2017   Take 1 tablet (10 mg) by mouth daily for 30 doses   Quantity:  30 tablet   Refills:  0       * Notice:  This list has 2 medication(s) that are the same as other medications prescribed for you. Read the directions carefully, and ask your doctor or other care provider to review them with you.         Where to get your medicines      These medications were sent to [facility] [place] - [address]  [address], [place] [address]     Phone:  [phone]     predniSONE 10 MG tablet                Primary Care Provider Office Phone # Fax #    Rafita Rogel [phone]        [facility] [address] [facility] [facility] [address]  [facility] [address]        Thank you!     Thank you for choosing [facility] CANCER CLINIC AND [facility] CENTER  for your care. Our goal is always to provide you with excellent care. Hearing back from our patients is one way we can continue to improve our services. Please take a few minutes to complete the written survey that you may receive in the mail after your visit with us. Thank you!             Your Updated Medication List - Protect others around you: Learn how to safely use, store and throw away your medicines at www.disposemymeds.org.          This list is accurate as of: 3/22/17  3:28 PM.  Always use your  most recent med list.                   Brand Name Dispense Instructions for use    BENADRYL ALLERGY 25 MG tablet   Generic drug:  diphenhydrAMINE     56 tablet    Take 1 tablet (25 mg) by mouth nightly as needed       chlorpheniramine 4 MG tablet    CHLOR-TRIMETON     Take 4 mg by mouth every 6 hours as needed. For head cold       COMPRESSION STOCKINGS     2 each    Wear compression stockings at 20-30 mmHg rating most time during the day to the affected leg (left leg) or both legs. Take them off at night.       cycloSPORINE modified 25 MG capsule    GENERIC EQUIVALENT    300 capsule    Take 3 capsules (75 mg) by mouth 2 times daily       diclofenac 1 % Gel topical gel    VOLTAREN    100 g    Apply 2 g topically 2 times daily       eltrombopag 50 MG tablet    PROMACTA    180 tablet    Take 3 tablets (150 mg) by mouth daily Administer on an empty stomach, 1 hour before or 2 hours after a meal. Or as directed       fluconazole 200 MG tablet    DIFLUCAN    30 tablet    Take 1 tablet (200 mg) by mouth daily       LEVAQUIN 250 MG tablet   Generic drug:  levofloxacin     30 tablet    Take 1 tablet (250 mg) by mouth daily       loratadine 10 MG tablet    CLARITIN     Take 10 mg by mouth daily as needed. For head cold       nystatin 844749 UNIT/ML suspension    MYCOSTATIN    280 mL    Take 5 mLs (500,000 Units) by mouth 4 times daily Swish and spit.       order for DME     1 Box    Equipment being ordered: knee high compression stockings- 18-20 mm       oxyCODONE 5 MG IR tablet    ROXICODONE    50 tablet    Take 1 tablet (5 mg) by mouth every 6 hours as needed for moderate to severe pain       pantoprazole 40 MG EC tablet    PROTONIX    30 tablet    Take 1 tablet (40 mg) by mouth every morning       polyethylene glycol Packet    MIRALAX/GLYCOLAX    14 packet    Take 17 g by mouth daily . If you start to have loose stools/diarrhea or multiple bowel movements, ok to hold this medication. Then resume if no bowel movement after  24-48hours.       * predniSONE 10 MG tablet    DELTASONE    180 tablet    Take 2 tablets (20 mg) by mouth daily Or as directed       * predniSONE 10 MG tablet   Start taking on:  3/24/2017    DELTASONE    30 tablet    Take 1 tablet (10 mg) by mouth daily for 30 doses       SENNA S 8.6-50 MG per tablet   Generic drug:  senna-docusate     100 tablet    Can take 1-2 tablets up to twice a day. Can start with 1-2 tablets daily. If no bowel movement within 24 hours, can take 1-2 tablets twice a day. HOLD if you develop diarrhea. Resume taking if no bowel movement in 24 hours.       TYLENOL PO      Take 325 mg by mouth every 6 hours as needed for mild pain or fever       * Notice:  This list has 2 medication(s) that are the same as other medications prescribed for you. Read the directions carefully, and ask your doctor or other care provider to review them with you.

## 2017-03-22 NOTE — PROGRESS NOTES
Ms. Sheldon called in to state that she needed a refill on her prednisone.  She was also calling to follow up on her schedule.  Refill placed as requested with approval from Sejal Walls PA-C.  Reviewed schedule with patient who verbalized understanding.   Patient will have a copy printed out today at her appointment.  Encouraged patient to call with any further questions, comments, or concerns.

## 2017-03-26 ENCOUNTER — HOSPITAL ENCOUNTER (OUTPATIENT)
Facility: CLINIC | Age: 72
Setting detail: SPECIMEN
Discharge: HOME | End: 2017-03-26
Attending: INTERNAL MEDICINE | Admitting: INTERNAL MEDICINE
Payer: COMMERCIAL

## 2017-03-26 ENCOUNTER — INFUSION THERAPY VISIT (OUTPATIENT)
Dept: INFUSION THERAPY | Facility: CLINIC | Age: 72
End: 2017-03-26
Attending: PHYSICIAN ASSISTANT
Payer: COMMERCIAL

## 2017-03-26 VITALS — DIASTOLIC BLOOD PRESSURE: 76 MMHG | HEART RATE: 77 BPM | TEMPERATURE: 98.2 F | SYSTOLIC BLOOD PRESSURE: 122 MMHG

## 2017-03-26 DIAGNOSIS — D61.818 PANCYTOPENIA (H): Primary | ICD-10-CM

## 2017-03-26 DIAGNOSIS — D61.3 IDIOPATHIC APLASTIC ANEMIA (H): ICD-10-CM

## 2017-03-26 LAB
BASOPHILS # BLD AUTO: 0 10E9/L (ref 0–0.2)
BASOPHILS NFR BLD AUTO: 0 %
BLD PROD TYP BPU: NORMAL
BLD PROD TYP BPU: NORMAL
BLD UNIT ID BPU: 0
BLOOD PRODUCT CODE: NORMAL
BPU ID: NORMAL
DIFFERENTIAL METHOD BLD: ABNORMAL
EOSINOPHIL # BLD AUTO: 0 10E9/L (ref 0–0.7)
EOSINOPHIL NFR BLD AUTO: 0 %
ERYTHROCYTE [DISTWIDTH] IN BLOOD BY AUTOMATED COUNT: 26.8 % (ref 10–15)
HCT VFR BLD AUTO: 24.1 % (ref 35–47)
HGB BLD-MCNC: 8.2 G/DL (ref 11.7–15.7)
LYMPHOCYTES # BLD AUTO: 2.4 10E9/L (ref 0.8–5.3)
LYMPHOCYTES NFR BLD AUTO: 59 %
MCH RBC QN AUTO: 33.1 PG (ref 26.5–33)
MCHC RBC AUTO-ENTMCNC: 34 G/DL (ref 31.5–36.5)
MCV RBC AUTO: 97 FL (ref 78–100)
METAMYELOCYTES # BLD: 0 10E9/L
METAMYELOCYTES NFR BLD MANUAL: 1 %
MONOCYTES # BLD AUTO: 0.1 10E9/L (ref 0–1.3)
MONOCYTES NFR BLD AUTO: 3 %
NEUTROPHILS # BLD AUTO: 1.5 10E9/L (ref 1.6–8.3)
NEUTROPHILS NFR BLD AUTO: 37 %
NRBC # BLD AUTO: 0.2 10*3/UL
NRBC BLD AUTO-RTO: 4 /100
NUM BPU REQUESTED: 1
OVALOCYTES BLD QL SMEAR: SLIGHT
PLATELET # BLD AUTO: 25 10E9/L (ref 150–450)
PLATELET # BLD EST: ABNORMAL 10*3/UL
RBC # BLD AUTO: 2.48 10E12/L (ref 3.8–5.2)
SPHEROCYTES BLD QL SMEAR: SLIGHT
TARGETS BLD QL SMEAR: SLIGHT
TRANSFUSION STATUS PATIENT QL: NORMAL
TRANSFUSION STATUS PATIENT QL: NORMAL
WBC # BLD AUTO: 4.1 10E9/L (ref 4–11)

## 2017-03-26 PROCEDURE — P9037 PLATE PHERES LEUKOREDU IRRAD: HCPCS | Performed by: PHYSICIAN ASSISTANT

## 2017-03-26 PROCEDURE — 36430 TRANSFUSION BLD/BLD COMPNT: CPT

## 2017-03-26 PROCEDURE — 85025 COMPLETE CBC W/AUTO DIFF WBC: CPT | Performed by: INTERNAL MEDICINE

## 2017-03-26 ASSESSMENT — PAIN SCALES - GENERAL: PAINLEVEL: MILD PAIN (2)

## 2017-03-26 NOTE — PROGRESS NOTES
Infusion Nursing Note:  Bonny Raymundo presents today for poss. Blood transfusion.    Patient seen by provider today: No   present during visit today: Not Applicable.    Note: platelet count 25,000. 1 unit of platelets given    Intravenous Access:  PICC.    Treatment Conditions:  Not Applicable.      Post Infusion Assessment:  Patient tolerated infusion without incident.    Discharge Plan:   Patient and/or family verbalized understanding of discharge instructions and all questions answered.  AVS to patient via [employer]T.  Patient will return 3/29 for next appointment.   Patient discharged in stable condition accompanied by: self.  Departure Mode: Ambulatory.  Face to Face time: 30.    Carmina Walls RN

## 2017-03-26 NOTE — MR AVS SNAPSHOT
After Visit Summary   3/26/2017    Bonny Raymundo    MRN: [id number]           Patient Information     Date Of Birth          1945        Visit Information        Provider Department      3/26/2017 8:30 AM  INFUSION CHAIR 12 [facility]        Today's Diagnoses     Pancytopenia (H)    -  1    Idiopathic aplastic anemia (H)           Follow-ups after your visit        Your next 10 appointments already scheduled     Mar 29, 2017  9:00 AM CDT   Level 5 with  INFUSION CHAIR 17   [facility]fusion [facility] ([facility])    [facility]  [address]  [place] [address]   [phone]            Apr 02, 2017  8:30 AM CDT   Level 5 with  INFUSION CHAIR 16   [facility] ([facility])    [facility]  [address]  [place] [address]   [phone]            Apr 04, 2017  7:15 AM CDT   Masonic Lab Draw with  MASONIC LAB DRAW   [facility] Lab Draw ([facility])    [address] [place] [address]  [address] [facility] [address]   [phone]            Apr 04, 2017  7:50 AM CDT   (Arrive by 7:35 AM)   Return Visit with Celine Walls PA-C   [facility] ([facility])    [address] [place] [address]  [address] [facility] [address]   [phone]            Apr 05, 2017  8:00 AM CDT   Level 5 with  INFUSION CHAIR 15   [facility] ([facility])    [facility]  [address]  [place] [address]   [phone]            Apr 09, 2017  8:30 AM CDT   Level 5 with  INFUSION CHAIR 14   [facility] ([facility])    [facility]  [address]  [place] [address]   [phone]            Apr 12, 2017  8:30  AM CDT   Level 5 with  INFUSION CHAIR 3   [facility] ([facility])    n [facility]  [address]  [place] [address]   [phone]            Apr 21, 2017  9:45 AM CDT   Masonic Lab Draw with  MASONIC LAB DRAW   [facility] Masonic Lab Draw ([facility])    [address] [facility]  [address] [facility] [address] [phone]            Apr 21, 2017 10:20 AM CDT   (Arrive by 10:05 AM)   Return Visit with Celine Walls PA-C   [facility] ([facility])    [address] [facility]  [address] [facility] [address] [phone]            May 04, 2017  4:00 PM CDT   RETURN ONC with Rafita Rogel MD   [facility] Blood and Marrow Transplant ([facility])    [address] [id number] [phone]              Future tests that were ordered for you today     Open Standing Orders        Priority Remaining Interval Expires Ordered    Platelets prepare order unit Routine 99/100 CONDITIONAL (SPECIFY) BLOOD  3/26/2017    Transfuse platelets unit Routine 99/100 TRANSFUSE 1 DOSE  3/26/2017            Who to contact     If you have questions or need follow up information about today's clinic visit or your schedule please contact [facility] CANCER [facility] INFUSION CENTER directly at [phone].  Normal or non-critical lab and imaging results will be communicated to you by [employer], letter or phone within 4 business days after the clinic has received the results. If you do not hear from us within 7 days, please contact the clinic through [employer] or phone. If you have a critical or abnormal lab result, we will notify you by phone as soon as possible.  Submit refill requests through [employer] or call your pharmacy and they will forward the refill request to us. Please allow 3 business days for your refill to be  "completed.          Additional Information About Your Visit        [employer] [facility] Information     [employer] lets you send messages to your doctor, view your test results, renew your prescriptions, schedule appointments and more. To sign up, go to [email or web address] . Click on \"Log in\" on the left side of the screen, which will take you to the Welcome page. Then click on \"Sign up Now\" on the right side of the page.     You will be asked to enter the access code listed below, as well as some personal information. Please follow the directions to create your username and password.     Your access code is: [email or web address]  Expires: 2017  1:59 PM     Your access code will  in 90 days. If you need help or a new code, please call your [place] clinic or [phone].        Care EveryWhere ID     This is your Care EveryWhere ID. This could be used by other organizations to access your [place] medical records  [id number]        Your Vitals Were     Pulse Temperature                77 98.2  F (36.8  C)           Blood Pressure from Last 3 Encounters:   17 122/76   17 132/84   17 114/75    Weight from Last 3 Encounters:   17 59.3 kg (130 lb 11.7 oz)   17 59.7 kg (131 lb 11.2 oz)   17 59.7 kg (131 lb 11.2 oz)              We Performed the Following     Blood component     CBC with platelets differential     Platelets prepare order unit     Transfuse platelets unit        Primary Care Provider Office Phone # Fax #    aRfita Rogel [phone]        PHYSICIANS [address] [facility] [address]  [facility] [address]        Thank you!     Thank you for choosing [facility] CANCER [facility] AND [facility] CENTER  for your care. Our goal is always to provide you with excellent care. Hearing back from our patients is one way we can continue to improve our services. Please take a few minutes to complete the written survey that you may receive in the mail after your visit with us. " Thank you!             Your Updated Medication List - Protect others around you: Learn how to safely use, store and throw away your medicines at www.disposemymeds.org.          This list is accurate as of: 3/26/17  1:03 PM.  Always use your most recent med list.                   Brand Name Dispense Instructions for use    BENADRYL ALLERGY 25 MG tablet   Generic drug:  diphenhydrAMINE     56 tablet    Take 1 tablet (25 mg) by mouth nightly as needed       chlorpheniramine 4 MG tablet    CHLOR-TRIMETON     Take 4 mg by mouth every 6 hours as needed. For head cold       COMPRESSION STOCKINGS     2 each    Wear compression stockings at 20-30 mmHg rating most time during the day to the affected leg (left leg) or both legs. Take them off at night.       cycloSPORINE modified 25 MG capsule    GENERIC EQUIVALENT    300 capsule    Take 3 capsules (75 mg) by mouth 2 times daily       diclofenac 1 % Gel topical gel    VOLTAREN    100 g    Apply 2 g topically 2 times daily       eltrombopag 50 MG tablet    PROMACTA    180 tablet    Take 3 tablets (150 mg) by mouth daily Administer on an empty stomach, 1 hour before or 2 hours after a meal. Or as directed       fluconazole 200 MG tablet    DIFLUCAN    30 tablet    Take 1 tablet (200 mg) by mouth daily       LEVAQUIN 250 MG tablet   Generic drug:  levofloxacin     30 tablet    Take 1 tablet (250 mg) by mouth daily       loratadine 10 MG tablet    CLARITIN     Take 10 mg by mouth daily as needed. For head cold       nystatin 314095 UNIT/ML suspension    MYCOSTATIN    280 mL    Take 5 mLs (500,000 Units) by mouth 4 times daily Swish and spit.       order for DME     1 Box    Equipment being ordered: knee high compression stockings- 18-20 mm       oxyCODONE 5 MG IR tablet    ROXICODONE    50 tablet    Take 1 tablet (5 mg) by mouth every 6 hours as needed for moderate to severe pain       pantoprazole 40 MG EC tablet    PROTONIX    30 tablet    Take 1 tablet (40 mg) by mouth every  morning       polyethylene glycol Packet    MIRALAX/GLYCOLAX    14 packet    Take 17 g by mouth daily . If you start to have loose stools/diarrhea or multiple bowel movements, ok to hold this medication. Then resume if no bowel movement after 24-48hours.       * predniSONE 10 MG tablet    DELTASONE    180 tablet    Take 2 tablets (20 mg) by mouth daily Or as directed       * predniSONE 10 MG tablet    DELTASONE    30 tablet    Take 1 tablet (10 mg) by mouth daily for 30 doses       SENNA S 8.6-50 MG per tablet   Generic drug:  senna-docusate     100 tablet    Can take 1-2 tablets up to twice a day. Can start with 1-2 tablets daily. If no bowel movement within 24 hours, can take 1-2 tablets twice a day. HOLD if you develop diarrhea. Resume taking if no bowel movement in 24 hours.       TYLENOL PO      Take 325 mg by mouth every 6 hours as needed for mild pain or fever       * Notice:  This list has 2 medication(s) that are the same as other medications prescribed for you. Read the directions carefully, and ask your doctor or other care provider to review them with you.

## 2017-03-27 ENCOUNTER — DOCUMENTATION ONLY (OUTPATIENT)
Dept: ONCOLOGY | Facility: CLINIC | Age: 72
End: 2017-03-27

## 2017-03-27 ENCOUNTER — DOCUMENTATION ONLY (OUTPATIENT)
Dept: TRANSPLANT | Facility: CLINIC | Age: 72
End: 2017-03-27

## 2017-03-27 NOTE — PROGRESS NOTES
NO AUTH NEEDED    Date Range of Authorization: N/A  Medication:  Promacta  Approved Dose/Quantity: 150mg, 90/30 days  Diagnosis: Idiopathic Aplastic Anemia  Reference #: N/A  Insurance Company: [employer]   Insurance I/D #: [id number]  Expected CoPay: $ High  CoPay Card Available: N/A  Foundation Assistance Needed: Yes, free Promacta thru [employer], ID# [id number]  Available 2/1/2017 - 12/31/2017  Which Pharmacy is filling the prescription (Not needed for infusion/clinic administered): [facility]    If you received a fax notification from an outside pharmacy;  Pharmacy Name: [email or web address]  Pharmacy #: [phone]  Pharmacy Fax: [phone]

## 2017-03-28 DIAGNOSIS — D61.818 PANCYTOPENIA (H): ICD-10-CM

## 2017-03-28 DIAGNOSIS — D61.3 IDIOPATHIC APLASTIC ANEMIA (H): ICD-10-CM

## 2017-03-29 ENCOUNTER — HOSPITAL ENCOUNTER (OUTPATIENT)
Facility: CLINIC | Age: 72
Setting detail: SPECIMEN
Discharge: HOME | End: 2017-03-29
Attending: PHYSICIAN ASSISTANT | Admitting: PHYSICIAN ASSISTANT
Payer: COMMERCIAL

## 2017-03-29 ENCOUNTER — INFUSION THERAPY VISIT (OUTPATIENT)
Dept: INFUSION THERAPY | Facility: CLINIC | Age: 72
End: 2017-03-29
Attending: PHYSICIAN ASSISTANT
Payer: COMMERCIAL

## 2017-03-29 VITALS
TEMPERATURE: 97.4 F | DIASTOLIC BLOOD PRESSURE: 70 MMHG | SYSTOLIC BLOOD PRESSURE: 115 MMHG | HEART RATE: 95 BPM | RESPIRATION RATE: 18 BRPM

## 2017-03-29 DIAGNOSIS — D61.818 PANCYTOPENIA (H): ICD-10-CM

## 2017-03-29 DIAGNOSIS — D61.3 IDIOPATHIC APLASTIC ANEMIA (H): Primary | ICD-10-CM

## 2017-03-29 LAB
ABO + RH BLD: NORMAL
ABO + RH BLD: NORMAL
ALBUMIN SERPL-MCNC: 3.4 G/DL (ref 3.4–5)
ALP SERPL-CCNC: 67 U/L (ref 40–150)
ALT SERPL W P-5'-P-CCNC: 31 U/L (ref 0–50)
ANION GAP SERPL CALCULATED.3IONS-SCNC: 12 MMOL/L (ref 3–14)
ANISOCYTOSIS BLD QL SMEAR: ABNORMAL
AST SERPL W P-5'-P-CCNC: 15 U/L (ref 0–45)
BASOPHILS # BLD AUTO: 0 10E9/L (ref 0–0.2)
BASOPHILS NFR BLD AUTO: 0 %
BILIRUB SERPL-MCNC: 2.4 MG/DL (ref 0.2–1.3)
BLD GP AB SCN SERPL QL: NORMAL
BLOOD BANK CMNT PATIENT-IMP: NORMAL
BUN SERPL-MCNC: 36 MG/DL (ref 7–30)
CALCIUM SERPL-MCNC: 8.5 MG/DL (ref 8.5–10.1)
CHLORIDE SERPL-SCNC: 104 MMOL/L (ref 94–109)
CO2 SERPL-SCNC: 23 MMOL/L (ref 20–32)
CREAT SERPL-MCNC: 1.24 MG/DL (ref 0.52–1.04)
CYCLOSPORINE BLD LC/MS/MS-MCNC: 323 UG/L (ref 50–400)
DIFFERENTIAL METHOD BLD: ABNORMAL
EOSINOPHIL # BLD AUTO: 0 10E9/L (ref 0–0.7)
EOSINOPHIL NFR BLD AUTO: 1 %
ERYTHROCYTE [DISTWIDTH] IN BLOOD BY AUTOMATED COUNT: ABNORMAL % (ref 10–15)
GFR SERPL CREATININE-BSD FRML MDRD: 42 ML/MIN/1.7M2
GLUCOSE SERPL-MCNC: 117 MG/DL (ref 70–99)
HCT VFR BLD AUTO: 23.8 % (ref 35–47)
HGB BLD-MCNC: 8.2 G/DL (ref 11.7–15.7)
LYMPHOCYTES # BLD AUTO: 2.2 10E9/L (ref 0.8–5.3)
LYMPHOCYTES NFR BLD AUTO: 57 %
MCH RBC QN AUTO: 33.9 PG (ref 26.5–33)
MCHC RBC AUTO-ENTMCNC: 34.5 G/DL (ref 31.5–36.5)
MCV RBC AUTO: 98 FL (ref 78–100)
MONOCYTES # BLD AUTO: 0.2 10E9/L (ref 0–1.3)
MONOCYTES NFR BLD AUTO: 6 %
NEUTROPHILS # BLD AUTO: 1.4 10E9/L (ref 1.6–8.3)
NEUTROPHILS NFR BLD AUTO: 36 %
NRBC # BLD AUTO: 0.3 10*3/UL
NRBC BLD AUTO-RTO: 7 /100
PLATELET # BLD AUTO: 46 10E9/L (ref 150–450)
PLATELET # BLD EST: ABNORMAL 10*3/UL
POTASSIUM SERPL-SCNC: 3.8 MMOL/L (ref 3.4–5.3)
PROT SERPL-MCNC: 7 G/DL (ref 6.8–8.8)
RBC # BLD AUTO: 2.42 10E12/L (ref 3.8–5.2)
SODIUM SERPL-SCNC: 139 MMOL/L (ref 133–144)
SPECIMEN EXP DATE BLD: NORMAL
SPHEROCYTES BLD QL SMEAR: SLIGHT
TME LAST DOSE: NORMAL H
WBC # BLD AUTO: 3.9 10E9/L (ref 4–11)

## 2017-03-29 PROCEDURE — 80158 DRUG ASSAY CYCLOSPORINE: CPT | Performed by: PHYSICIAN ASSISTANT

## 2017-03-29 PROCEDURE — 85025 COMPLETE CBC W/AUTO DIFF WBC: CPT | Performed by: PHYSICIAN ASSISTANT

## 2017-03-29 PROCEDURE — 86901 BLOOD TYPING SEROLOGIC RH(D): CPT | Performed by: PHYSICIAN ASSISTANT

## 2017-03-29 PROCEDURE — 80053 COMPREHEN METABOLIC PANEL: CPT | Performed by: PHYSICIAN ASSISTANT

## 2017-03-29 PROCEDURE — 25000125 ZZHC RX 250: Performed by: NURSE PRACTITIONER

## 2017-03-29 PROCEDURE — 86850 RBC ANTIBODY SCREEN: CPT | Performed by: PHYSICIAN ASSISTANT

## 2017-03-29 PROCEDURE — 94642 AEROSOL INHALATION TREATMENT: CPT

## 2017-03-29 PROCEDURE — 94640 AIRWAY INHALATION TREATMENT: CPT

## 2017-03-29 PROCEDURE — 86900 BLOOD TYPING SEROLOGIC ABO: CPT | Performed by: PHYSICIAN ASSISTANT

## 2017-03-29 RX ORDER — ALBUTEROL SULFATE 5 MG/ML
2.5 SOLUTION RESPIRATORY (INHALATION) EVERY 6 HOURS PRN
Status: CANCELLED
Start: 2017-04-01

## 2017-03-29 RX ORDER — ALBUTEROL SULFATE 0.83 MG/ML
2.5 SOLUTION RESPIRATORY (INHALATION) EVERY 6 HOURS PRN
Status: DISCONTINUED | OUTPATIENT
Start: 2017-03-29 | End: 2017-03-29 | Stop reason: HOSPADM

## 2017-03-29 RX ORDER — PENTAMIDINE ISETHIONATE 300 MG/300MG
300 INHALANT RESPIRATORY (INHALATION) ONCE
Status: COMPLETED | OUTPATIENT
Start: 2017-03-29 | End: 2017-03-29

## 2017-03-29 RX ORDER — PENTAMIDINE ISETHIONATE 300 MG/300MG
300 INHALANT RESPIRATORY (INHALATION) ONCE
Status: CANCELLED
Start: 2017-04-01 | End: 2017-04-01

## 2017-03-29 RX ADMIN — ALBUTEROL SULFATE 2.5 MG: 2.5 SOLUTION RESPIRATORY (INHALATION) at 10:33

## 2017-03-29 RX ADMIN — PENTAMIDINE ISETHIONATE 300 MG: 300 INHALANT RESPIRATORY (INHALATION) at 10:52

## 2017-03-29 ASSESSMENT — PAIN SCALES - GENERAL: PAINLEVEL: NO PAIN (1)

## 2017-03-29 NOTE — MR AVS SNAPSHOT
After Visit Summary   3/29/2017    Bonny Raymundo    MRN: [id number]           Patient Information     Date Of Birth          1945        Visit Information        Provider Department      3/29/2017 9:00 AM  INFUSION CHAIR 17 [facility]        Today's Diagnoses     Idiopathic aplastic anemia (H)    -  1    Pancytopenia (H)           Follow-ups after your visit        Your next 10 appointments already scheduled     Apr 02, 2017  8:30 AM CDT   Level 5 with  INFUSION CHAIR 16   [facility] ([facility])    [facility]  [address]  [place] [address]   [phone]            Apr 04, 2017  7:15 AM CDT   Masonic Lab Draw with  MASONIC LAB DRAW   [facility] ([facility])    [address] [place] [address]   [phone]            Apr 04, 2017  7:50 AM CDT   (Arrive by 7:35 AM)   Return Visit with Celine Walls PA-C   [facility])    [address] [place] [address]   [phone]            Apr 05, 2017  8:00 AM CDT   Level 5 with  INFUSION CHAIR 15   [facility] ([facility])    [facility]  [address]  [place] [address]   [phone]            Apr 09, 2017  8:30 AM CDT   Level 5 with  INFUSION CHAIR 14   [facility] ([facility])    [facility]  [address]  [place] [address]   [phone]            Apr 12, 2017  8:30 AM CDT   Level 5 with  INFUSION CHAIR 3   [facility] ([facility])    [facility] [place]  [address]  [place] [address]   [phone]            Apr 21, 2017  9:45  "AM CDT   Masonic Lab Draw with  MASONIC LAB DRAW   [facility] Masonic Lab Draw ([facility])    [address] [facility]  [address] [facility] [address]   [phone]            Apr 21, 2017 10:20 AM CDT   (Arrive by 10:05 AM)   Return Visit with Celine Walls PA-C   [facility] ([facility])    [address] [facility]  [address] [facility] [address]   [phone]            May 04, 2017  3:30 PM CDT   Masonic Lab Draw with  MASONIC LAB DRAW   [facility] Masonic Lab Draw ([facility])    [address] [address]   [phone]            May 04, 2017  4:00 PM CDT   RETURN ONC with Rafita Rogel MD   [facility] Blood and Marrow Transplant ([facility])    [address] [address] [phone]              Who to contact     If you have questions or need follow up information about today's clinic visit or your schedule please contact [facility] CANCER CLINIC AND [facility] C[facility] directly at [phone].  Normal or non-critical lab and imaging results will be communicated to you by [employer], letter or phone within 4 business days after the clinic has received the results. If you do not hear from us within 7 days, please contact the clinic through [employer] or phone. If you have a critical or abnormal lab result, we will notify you by phone as soon as possible.  Submit refill requests through [facility] or call your pharmacy and they will forward the refill request to us. Please allow 3 business days for your refill to be completed.          Additional Information About Your Visit        [employer] Information     [facility] lets you send messages to your doctor, view your test results, renew your prescriptions, schedule appointments and more. To sign up, go to [email or web address] . Click on \"Log in\" on the left side of the " "screen, which will take you to the Welcome page. Then click on \"Sign up Now\" on the right side of the page.     You will be asked to enter the access code listed below, as well as some personal information. Please follow the directions to create your username and password.     Your access code is: [email or web address]  Expires: 2017  1:59 PM     Your access code will  in 90 days. If you need help or a new code, please call your [facility] or [phone].        Care EveryWhere ID     This is your Care EveryWhere ID. This could be used by other organizations to access your [place] medical records  [id number]        Your Vitals Were     Pulse Temperature Respirations             95 97.4  F (36.3  C) (Oral) 18          Blood Pressure from Last 3 Encounters:   17 115/70   17 122/76   17 132/84    Weight from Last 3 Encounters:   17 59.3 kg (130 lb 11.7 oz)   17 59.7 kg (131 lb 11.2 oz)   17 59.7 kg (131 lb 11.2 oz)              We Performed the Following     ABO/Rh type and screen     CBC with platelets differential     Comprehensive metabolic panel     Cyclosporine     MD Instruction for Therapy Plan        Primary Care Provider Office Phone # Fax #    Rafita Rogel [phone]        PHYSICIANS [address] [facility] [address]  [facility] [address]        Thank you!     Thank you for choosing [facility] CANCER [facility] AND [facility] CENTER  for your care. Our goal is always to provide you with excellent care. Hearing back from our patients is one way we can continue to improve our services. Please take a few minutes to complete the written survey that you may receive in the mail after your visit with us. Thank you!             Your Updated Medication List - Protect others around you: Learn how to safely use, store and throw away your medicines at www.disposemymeds.org.          This list is accurate as of: 3/29/17 11:46 AM.  Always use your most recent med " list.                   Brand Name Dispense Instructions for use    BENADRYL ALLERGY 25 MG tablet   Generic drug:  diphenhydrAMINE     56 tablet    Take 1 tablet (25 mg) by mouth nightly as needed       chlorpheniramine 4 MG tablet    CHLOR-TRIMETON     Take 4 mg by mouth every 6 hours as needed. For head cold       COMPRESSION STOCKINGS     2 each    Wear compression stockings at 20-30 mmHg rating most time during the day to the affected leg (left leg) or both legs. Take them off at night.       cycloSPORINE modified 25 MG capsule    GENERIC EQUIVALENT    300 capsule    Take 3 capsules (75 mg) by mouth 2 times daily       diclofenac 1 % Gel topical gel    VOLTAREN    100 g    Apply 2 g topically 2 times daily       eltrombopag 50 MG tablet    PROMACTA    90 tablet    Take 3 tablets (150 mg) by mouth daily Administer on an empty stomach, 1 hour before or 2 hours after a meal. Or as directed       fluconazole 200 MG tablet    DIFLUCAN    30 tablet    Take 1 tablet (200 mg) by mouth daily       LEVAQUIN 250 MG tablet   Generic drug:  levofloxacin     30 tablet    Take 1 tablet (250 mg) by mouth daily       loratadine 10 MG tablet    CLARITIN     Take 10 mg by mouth daily as needed. For head cold       nystatin [id number] UNIT/ML suspension    MYCOSTATIN    280 mL    Take 5 mLs (500,000 Units) by mouth 4 times daily Swish and spit.       order for DME     1 Box    Equipment being ordered: knee high compression stockings- 18-20 mm       oxyCODONE 5 MG IR tablet    ROXICODONE    50 tablet    Take 1 tablet (5 mg) by mouth every 6 hours as needed for moderate to severe pain       pantoprazole 40 MG EC tablet    PROTONIX    30 tablet    Take 1 tablet (40 mg) by mouth every morning       polyethylene glycol Packet    MIRALAX/GLYCOLAX    14 packet    Take 17 g by mouth daily . If you start to have loose stools/diarrhea or multiple bowel movements, ok to hold this medication. Then resume if no bowel movement after 24-48hours.        * predniSONE 10 MG tablet    DELTASONE    180 tablet    Take 2 tablets (20 mg) by mouth daily Or as directed       * predniSONE 10 MG tablet    DELTASONE    30 tablet    Take 1 tablet (10 mg) by mouth daily for 30 doses       SENNA S 8.6-50 MG per tablet   Generic drug:  senna-docusate     100 tablet    Can take 1-2 tablets up to twice a day. Can start with 1-2 tablets daily. If no bowel movement within 24 hours, can take 1-2 tablets twice a day. HOLD if you develop diarrhea. Resume taking if no bowel movement in 24 hours.       TYLENOL PO      Take 325 mg by mouth every 6 hours as needed for mild pain or fever       * Notice:  This list has 2 medication(s) that are the same as other medications prescribed for you. Read the directions carefully, and ask your doctor or other care provider to review them with you.

## 2017-03-29 NOTE — PROGRESS NOTES
Infusion Nursing Note:  Bonny Raymundo presents today for labs, possible transfusion, pent neb.    Patient seen by provider today: No   present during visit today: Not Applicable.    Note: N/A.    Intravenous Access:  Labs drawn without difficulty.  PICC.    Treatment Conditions:  Lab Results   Component Value Date    HGB 8.2 03/29/2017     Lab Results   Component Value Date    WBC 3.9 03/29/2017      Lab Results   Component Value Date    ANEU 1.4 03/29/2017     Lab Results   Component Value Date    PLT 46 03/29/2017      Lab Results   Component Value Date     03/29/2017                   Lab Results   Component Value Date    POTASSIUM 3.8 03/29/2017           Lab Results   Component Value Date    MAG 1.4 01/25/2017            Lab Results   Component Value Date    CR 1.24 03/29/2017                   Lab Results   Component Value Date    LEO 8.5 03/29/2017                Lab Results   Component Value Date    BILITOTAL 2.4 03/29/2017           Lab Results   Component Value Date    ALBUMIN 3.4 03/29/2017                    Lab Results   Component Value Date    ALT 31 03/29/2017           Lab Results   Component Value Date    AST 15 03/29/2017     Results reviewed, labs did NOT meet treatment parameters: no need for transfusion today.  Pent neb today..      Post Infusion Assessment:  Patient tolerated pent neb without incident.  Blood return noted pre and post infusion.  Site patent and intact, free from redness, edema or discomfort.  No evidence of extravasations.    Discharge Plan:   Patient declined prescription refills.  Discharge instructions reviewed with: Patient.  Patient and/or family verbalized understanding of discharge instructions and all questions answered.  Copy of AVS reviewed with patient and/or family.  Patient will return 4/2/17 for next appointment.  Patient discharged in stable condition accompanied by: self.  Departure Mode: Ambulatory.    Jyoti Upton,  RN

## 2017-03-30 RX ORDER — FLUCONAZOLE 200 MG/1
TABLET ORAL
Qty: 30 TABLET | Refills: 3 | Status: SHIPPED | OUTPATIENT
Start: 2017-03-30 | End: 2017-04-24

## 2017-03-30 RX ORDER — LEVOFLOXACIN 500 MG/1
TABLET, FILM COATED ORAL
Qty: 30 TABLET | Refills: 3 | Status: SHIPPED | OUTPATIENT
Start: 2017-03-30 | End: 2017-04-04

## 2017-04-02 ENCOUNTER — INFUSION THERAPY VISIT (OUTPATIENT)
Dept: INFUSION THERAPY | Facility: CLINIC | Age: 72
End: 2017-04-02
Attending: INTERNAL MEDICINE
Payer: COMMERCIAL

## 2017-04-02 ENCOUNTER — HOSPITAL ENCOUNTER (OUTPATIENT)
Facility: CLINIC | Age: 72
Setting detail: SPECIMEN
Discharge: HOME | End: 2017-04-02
Attending: INTERNAL MEDICINE | Admitting: INTERNAL MEDICINE
Payer: COMMERCIAL

## 2017-04-02 VITALS
RESPIRATION RATE: 16 BRPM | DIASTOLIC BLOOD PRESSURE: 97 MMHG | OXYGEN SATURATION: 96 % | SYSTOLIC BLOOD PRESSURE: 137 MMHG | HEART RATE: 85 BPM | TEMPERATURE: 98.6 F

## 2017-04-02 DIAGNOSIS — D61.818 PANCYTOPENIA (H): Primary | ICD-10-CM

## 2017-04-02 DIAGNOSIS — D61.3 IDIOPATHIC APLASTIC ANEMIA (H): ICD-10-CM

## 2017-04-02 LAB
ABO + RH BLD: NORMAL
ABO + RH BLD: NORMAL
ANISOCYTOSIS BLD QL SMEAR: ABNORMAL
BASOPHILS # BLD AUTO: 0 10E9/L (ref 0–0.2)
BASOPHILS NFR BLD AUTO: 0 %
BLD GP AB SCN SERPL QL: NORMAL
BLD PROD TYP BPU: NORMAL
BLD UNIT ID BPU: 0
BLD UNIT ID BPU: 0
BLOOD BANK CMNT PATIENT-IMP: NORMAL
BLOOD PRODUCT CODE: NORMAL
BLOOD PRODUCT CODE: NORMAL
BPU ID: NORMAL
BPU ID: NORMAL
DIFFERENTIAL METHOD BLD: ABNORMAL
EOSINOPHIL # BLD AUTO: 0 10E9/L (ref 0–0.7)
EOSINOPHIL NFR BLD AUTO: 1 %
ERYTHROCYTE [DISTWIDTH] IN BLOOD BY AUTOMATED COUNT: ABNORMAL % (ref 10–15)
HCT VFR BLD AUTO: 22.2 % (ref 35–47)
HGB BLD-MCNC: 7.5 G/DL (ref 11.7–15.7)
LYMPHOCYTES # BLD AUTO: 2 10E9/L (ref 0.8–5.3)
LYMPHOCYTES NFR BLD AUTO: 53 %
MCH RBC QN AUTO: 34.4 PG (ref 26.5–33)
MCHC RBC AUTO-ENTMCNC: 33.8 G/DL (ref 31.5–36.5)
MCV RBC AUTO: 102 FL (ref 78–100)
MONOCYTES # BLD AUTO: 0.1 10E9/L (ref 0–1.3)
MONOCYTES NFR BLD AUTO: 4 %
NEUTROPHILS # BLD AUTO: 1.6 10E9/L (ref 1.6–8.3)
NEUTROPHILS NFR BLD AUTO: 42 %
NRBC # BLD AUTO: 0.2 10*3/UL
NRBC BLD AUTO-RTO: 5 /100
NUM BPU REQUESTED: 0
NUM BPU REQUESTED: 1
NUM BPU REQUESTED: 1
PLATELET # BLD AUTO: 20 10E9/L (ref 150–450)
PLATELET # BLD EST: ABNORMAL 10*3/UL
RBC # BLD AUTO: 2.18 10E12/L (ref 3.8–5.2)
SPECIMEN EXP DATE BLD: NORMAL
TRANSFUSION STATUS PATIENT QL: NORMAL
WBC # BLD AUTO: 3.7 10E9/L (ref 4–11)

## 2017-04-02 PROCEDURE — P9037 PLATE PHERES LEUKOREDU IRRAD: HCPCS | Performed by: PHYSICIAN ASSISTANT

## 2017-04-02 PROCEDURE — P9040 RBC LEUKOREDUCED IRRADIATED: HCPCS | Performed by: INTERNAL MEDICINE

## 2017-04-02 PROCEDURE — 86923 COMPATIBILITY TEST ELECTRIC: CPT | Performed by: INTERNAL MEDICINE

## 2017-04-02 PROCEDURE — 85025 COMPLETE CBC W/AUTO DIFF WBC: CPT | Performed by: INTERNAL MEDICINE

## 2017-04-02 PROCEDURE — 36430 TRANSFUSION BLD/BLD COMPNT: CPT

## 2017-04-02 PROCEDURE — 86850 RBC ANTIBODY SCREEN: CPT | Performed by: INTERNAL MEDICINE

## 2017-04-02 PROCEDURE — 86901 BLOOD TYPING SEROLOGIC RH(D): CPT | Performed by: INTERNAL MEDICINE

## 2017-04-02 PROCEDURE — 86900 BLOOD TYPING SEROLOGIC ABO: CPT | Performed by: INTERNAL MEDICINE

## 2017-04-02 ASSESSMENT — PAIN SCALES - GENERAL: PAINLEVEL: NO PAIN (1)

## 2017-04-02 NOTE — PROGRESS NOTES
Infusion Nursing Note:  Bonny Raymundo presents today for PRBCs.    Patient seen by provider today: No   present during visit today: Not Applicable.    Note: N/A.    Intravenous Access:  PICC.    Treatment Conditions:  Lab Results   Component Value Date    HGB 7.5 04/02/2017     Lab Results   Component Value Date    WBC 3.7 04/02/2017      Lab Results   Component Value Date    ANEU 1.6 04/02/2017     Lab Results   Component Value Date    PLT 20 04/02/2017      Results reviewed, labs MET treatment parameters, ok to proceed with treatment.  Blood transfusion consent signed 10/6/16.      Post Infusion Assessment:  Patient tolerated transfusion without incident.  Site patent and intact, free from redness, edema or discomfort.  No evidence of extravasations.  Access discontinued per protocol.    Discharge Plan:   Patient and/or family verbalized understanding of discharge instructions and all questions answered.  Copy of AVS reviewed with patient and/or family.  Patient will return Wednesday for next appointment.  Patient discharged in stable condition accompanied by: self.  Departure Mode: Ambulatory.    Too Singh RN

## 2017-04-02 NOTE — MR AVS SNAPSHOT
After Visit Summary   4/2/2017    Bonny Raymundo    MRN: [id number]           Patient Information     Date Of Birth          1945        Visit Information        Provider Department      4/2/2017 8:30 AM  INFUSION CHAIR 16 [facility]        Today's Diagnoses     Pancytopenia (H)    -  1    Idiopathic aplastic anemia (H)           Follow-ups after your visit        Your next 10 appointments already scheduled     Apr 04, 2017  7:50 AM CDT   (Arrive by 7:35 AM)   Return Visit with CLARIBEL [facility]    [address]  [address]nd [facility] [address]   [phone]            Apr 05, 2017  8:00 AM CDT   Level 5 with  INFUSION CHAIR 15   [facility] ([facility])    [facility] [place]  [address]  [place] [address]   [phone]            Apr 09, 2017  8:30 AM CDT   Level 5 with  INFUSION CHAIR 14   [facility] ([facility])    [facility] [place]  [address]  [place] [address]   [phone]            Apr 12, 2017  8:30 AM CDT   Level 5 with  INFUSION CHAIR 3   [facility] ([facility])    [facility] [place]  [address]  [place] [address]   [phone]            Apr 21, 2017  9:45 AM CDT   [facility] with  [facility] DRAW   [facility] ([facility]    [address]  2nd Floor  [facility] [address] [phone]            Apr 21, 2017 10:20 AM CDT   (Arrive by 10:05 AM)   Return Visit with CLARIBEL [facility]    [address]  2nd Floor  [facility] [address]   [phone]            Apr 23, 2017   8:30 AM CDT   Level 5 with  INFUSION CHAIR 13   [facility] ([facility])    [facility] [place] [address]  [place] [address]   [phone]            Apr 26, 2017  8:30 AM CDT   Level 5 with SH INFUSION CHAIR 17   [facility] ([facility])    [facility] [place]  [address]  [place] [address]   [phone]            May 04, 2017  3:30 PM CDT   [facility] Draw with  [facility] LAB DRAW   [facility] ([facility])    [address]  [address] [facility] [address] [phone]            May 04, 2017  4:00 PM CDT   RETURN ONC with Rafita Rogel MD   [facility] ([facility])    [address] [address] [phone]              Future tests that were ordered for you today     Open Standing Orders        Priority Remaining Interval Expires Ordered    Red blood cell prepare order unit Routine 98/100 CONDITIONAL (SPECIFY) BLOOD  4/2/2017    Platelets prepare order unit Routine 98/100 CONDITIONAL (SPECIFY) BLOOD  4/2/2017    Transfuse platelets unit Routine 99/100 TRANSFUSE 1 DOSE  4/2/2017    Transfuse red blood cell unit Routine 99/100 TRANSFUSE 1 UNIT  4/2/2017            Who to contact     If you have questions or need follow up information about today's clinic visit or your schedule please contact [facility] directly at [phone].  Normal or non-critical lab and imaging results will be communicated to you by MyChart, letter or phone within 4 business days after the clinic has received the results. If you do not hear from us within 7 days, please contact the clinic through MyChart or phone. If you have a critical or abnormal lab result, we will notify you by phone as soon as  "possible.  Submit refill requests through [employer] or call your pharmacy and they will forward the refill request to us. Please allow 3 business days for your refill to be completed.          Additional Information About Your Visit        EpicTopicharSeguricel Information     [employer] lets you send messages to your doctor, view your test results, renew your prescriptions, schedule appointments and more. To sign up, go to [email or web address] . Click on \"Log in\" on the left side of the screen, which will take you to the Welcome page. Then click on \"Sign up Now\" on the right side of the page.     You will be asked to enter the access code listed below, as well as some personal information. Please follow the directions to create your username and password.     Your access code is: [email or web address]  Expires: 2017  1:59 PM     Your access code will  in 90 days. If you need help or a new code, please call your [facility] clinic or [phone].        Care EveryWhere ID     This is your Care EveryWhere ID. This could be used by other organizations to access your [facility] medical records  [id number]        Your Vitals Were     Pulse Temperature Respirations Pulse Oximetry          85 98.6  F (37  C) (Oral) 16 96%         Blood Pressure from Last 3 Encounters:   17 (!) 137/97   17 115/70   17 122/76    Weight from Last 3 Encounters:   17 59.3 kg (130 lb 11.7 oz)   17 59.7 kg (131 lb 11.2 oz)   17 59.7 kg (131 lb 11.2 oz)              We Performed the Following     ABO/Rh type and screen     Blood component     Blood component     CBC with platelets differential     MD Instruction for Therapy Plan     Platelets prepare order unit     Platelets prepare order unit     Red blood cell prepare order unit     Red blood cell prepare order unit     Transfuse platelets unit     Transfuse red blood cell unit        Primary Care Provider Office Phone # Fax #    Rafita Rogel [phone] " [phone]        [facility] [address] [facility] [address]  [facility] [address]        Thank you!     Thank you for choosing [facility]  for your care. Our goal is always to provide you with excellent care. Hearing back from our patients is one way we can continue to improve our services. Please take a few minutes to complete the written survey that you may receive in the mail after your visit with us. Thank you!             Your Updated Medication List - Protect others around you: Learn how to safely use, store and throw away your medicines at www.disposemymeds.org.          This list is accurate as of: 4/2/17  1:12 PM.  Always use your most recent med list.                   Brand Name Dispense Instructions for use    BENADRYL ALLERGY 25 MG tablet   Generic drug:  diphenhydrAMINE     56 tablet    Take 1 tablet (25 mg) by mouth nightly as needed       chlorpheniramine 4 MG tablet    CHLOR-TRIMETON     Take 4 mg by mouth every 6 hours as needed. For head cold       COMPRESSION STOCKINGS     2 each    Wear compression stockings at 20-30 mmHg rating most time during the day to the affected leg (left leg) or both legs. Take them off at night.       cycloSPORINE modified 25 MG capsule    GENERIC EQUIVALENT    300 capsule    Take 3 capsules (75 mg) by mouth 2 times daily       diclofenac 1 % Gel topical gel    VOLTAREN    100 g    Apply 2 g topically 2 times daily       eltrombopag 50 MG tablet    PROMACTA    90 tablet    Take 3 tablets (150 mg) by mouth daily Administer on an empty stomach, 1 hour before or 2 hours after a meal. Or as directed       * fluconazole 200 MG tablet    DIFLUCAN    30 tablet    Take 1 tablet (200 mg) by mouth daily       * fluconazole 200 MG tablet    DIFLUCAN    30 tablet    TAKE ONE TABLET BY MOUTH EVERY DAY       * LEVAQUIN 250 MG tablet   Generic drug:  levofloxacin     30 tablet    Take 1 tablet (250 mg) by mouth daily       * levofloxacin 500 MG tablet     LEVAQUIN    30 tablet    TAKE ONE TABLET BY MOUTH EVERY DAY       loratadine 10 MG tablet    CLARITIN     Take 10 mg by mouth daily as needed. For head cold       nystatin 850804 UNIT/ML suspension    MYCOSTATIN    280 mL    Take 5 mLs (500,000 Units) by mouth 4 times daily Swish and spit.       order for DME     1 Box    Equipment being ordered: knee high compression stockings- 18-20 mm       oxyCODONE 5 MG IR tablet    ROXICODONE    50 tablet    Take 1 tablet (5 mg) by mouth every 6 hours as needed for moderate to severe pain       pantoprazole 40 MG EC tablet    PROTONIX    30 tablet    Take 1 tablet (40 mg) by mouth every morning       polyethylene glycol Packet    MIRALAX/GLYCOLAX    14 packet    Take 17 g by mouth daily . If you start to have loose stools/diarrhea or multiple bowel movements, ok to hold this medication. Then resume if no bowel movement after 24-48hours.       * predniSONE 10 MG tablet    DELTASONE    180 tablet    Take 2 tablets (20 mg) by mouth daily Or as directed       * predniSONE 10 MG tablet    DELTASONE    30 tablet    Take 1 tablet (10 mg) by mouth daily for 30 doses       SENNA S 8.6-50 MG per tablet   Generic drug:  senna-docusate     100 tablet    Can take 1-2 tablets up to twice a day. Can start with 1-2 tablets daily. If no bowel movement within 24 hours, can take 1-2 tablets twice a day. HOLD if you develop diarrhea. Resume taking if no bowel movement in 24 hours.       TYLENOL PO      Take 325 mg by mouth every 6 hours as needed for mild pain or fever       * Notice:  This list has 6 medication(s) that are the same as other medications prescribed for you. Read the directions carefully, and ask your doctor or other care provider to review them with you.

## 2017-04-04 ENCOUNTER — ONCOLOGY VISIT (OUTPATIENT)
Dept: ONCOLOGY | Facility: CLINIC | Age: 72
End: 2017-04-04
Attending: PHYSICIAN ASSISTANT
Payer: COMMERCIAL

## 2017-04-04 VITALS
OXYGEN SATURATION: 96 % | RESPIRATION RATE: 18 BRPM | HEIGHT: 63 IN | HEART RATE: 100 BPM | TEMPERATURE: 97.4 F | DIASTOLIC BLOOD PRESSURE: 91 MMHG | BODY MASS INDEX: 23.23 KG/M2 | WEIGHT: 131.1 LBS | SYSTOLIC BLOOD PRESSURE: 142 MMHG

## 2017-04-04 DIAGNOSIS — D61.818 PANCYTOPENIA (H): ICD-10-CM

## 2017-04-04 DIAGNOSIS — D61.3 IDIOPATHIC APLASTIC ANEMIA (H): ICD-10-CM

## 2017-04-04 DIAGNOSIS — M54.50 LEFT-SIDED LOW BACK PAIN WITHOUT SCIATICA, UNSPECIFIED CHRONICITY: ICD-10-CM

## 2017-04-04 PROCEDURE — 99212 OFFICE O/P EST SF 10 MIN: CPT | Mod: ZF

## 2017-04-04 PROCEDURE — 99214 OFFICE O/P EST MOD 30 MIN: CPT | Mod: ZP | Performed by: PHYSICIAN ASSISTANT

## 2017-04-04 RX ORDER — OXYCODONE HYDROCHLORIDE 5 MG/1
5 TABLET ORAL EVERY 6 HOURS PRN
Qty: 50 TABLET | Refills: 0 | Status: SHIPPED | OUTPATIENT
Start: 2017-04-04 | End: 2017-04-12

## 2017-04-04 RX ORDER — CYCLOSPORINE 25 MG/1
75 CAPSULE, LIQUID FILLED ORAL 2 TIMES DAILY
Qty: 300 CAPSULE | Refills: 3 | COMMUNITY
Start: 2017-04-04 | End: 2017-06-14 | Stop reason: DRUGHIGH

## 2017-04-04 ASSESSMENT — PAIN SCALES - GENERAL: PAINLEVEL: NO PAIN (1)

## 2017-04-04 NOTE — PROGRESS NOTES
[facility]  FOLLOW-UP VISIT NOTE  Date of visit:   4/4/17        REASON FOR VISIT: Aplastic anemia, routine 2 week follow up    HPI: Bonny is a 71 year old female who presented in the fall of 2016 with fatigue and shortness of breath. 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  Her counts showed pancytopenia and BMBX was concerning for aplastic anemia.  By December of 2016 she was transfusion dependent with platelets under 10 k and ANC dropped under 500. 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  She has met with Dr Mcdaniel at [facility] and consulted with Dr Rogel at [facility].      After further consultation it was decided to admit on 1/9/17 for ATG/cyclosporine/prednisone therapy.     The following was her inpatient regimen:  Day 1= 1/9/17  ATG 2500 mg (40 mg/kg0 q24 hours D-4)  Cyclosporine 200 mg (3 mg/kg) PO bid  Eltrombopag 50 mg daily  Methylpred 31 mg (0.5 mg/kg) q24 hours D1-4  Prednisone 60 mg (1 mg/kg) daily after completion of IV methylpred to continue for at least 2 weeks    Bonny also had a admission 1/23-1/25/17 for rectal bleeding (presumed hemorrhoidal).  See DC summary for details.     INTERVAL HISTORY: Bonny is here today for her q2w follow up. Bonny feels that she is feeling the same as she has been. Her stamina and OCASIO has been marginally better the last month. She is now walking her dog most days of the week, although it is very short (15 minutes).  This wears her out, but she was unable to do this a month ago.  She continues to eat good for meals and family/friends continue to bring her food. Her low back pain is actually better. Bonny is using tylenol or 1 oxycodone daily.     No fevers, chest pain and and no bleeding. Daily BM, no black/blood. No  issues. She feels she is moving around better and that overall her fatigue must be less because she is doing more.  She still feels her breathing is short  "with activity and that blood doesn't really help that very much any more. She has a tremor.    ROS: complete review of systems was performed which was negative unless noted above.    EXAM:  BP (!) 142/91 (BP Location: Left arm, Patient Position: Chair, Cuff Size: Adult Regular)  Pulse 100  Temp 97.4  F (36.3  C) (Oral)  Resp 18  Ht 1.6 m (5' 2.99\")  Wt 59.5 kg (131 lb 1.6 oz)  SpO2 96%  BMI 23.23 kg/m2  Wt Readings from Last 4 Encounters:   04/04/17 59.5 kg (131 lb 1.6 oz)   03/09/17 59.3 kg (130 lb 11.7 oz)   03/03/17 59.7 kg (131 lb 11.2 oz)   03/03/17 59.7 kg (131 lb 11.2 oz)     General: Patient alert and oriented x3.    EYES: PERRLA, conjunctiva pink, sclera is jaundice.   Neck: No palpable cervical, supraclavicular or axillary nodes bilaterally.   Cardiovascular: RRR, no murmurs, gallops or rubs  Respiratory: clear to auscultation bilaterally;  no crackles, rhonchi or wheezing.   Abdomen: positive bowel sounds in all four quadrants, soft, nontender  Skin: light jaundice, intact  Neuro: grossly intact.   Mood and affect is stable.       LABS: no labs today- we reviewed recent labs done at [facility]       3/22/2017 10:20 3/26/2017 09:00 3/29/2017 09:33 4/2/2017 08:51   WBC 4.1 4.1 3.9 (L) 3.7 (L)   Hemoglobin 8.3 (L) 8.2 (L) 8.2 (L) 7.5 (L)   Hematocrit 24.6 (L) 24.1 (L) 23.8 (L) 22.2 (L)   Platelet Count 23 (LL) 25 (LL) 46 (LL) 20 (LL)   RBC Count 2.60 (L) 2.48 (L) 2.42 (L) 2.18 (L)   MCV 95 97 98 102 (H)   Absolute Neutrophil 1.8 1.5 (L) 1.4 (L) 1.6      3/15/2017 10:30 3/22/2017 10:20 3/29/2017 09:33   Sodium 138 140 139   Potassium 4.0 4.0 3.8   Chloride 104 105 104   Carbon Dioxide 27 24 23   Urea Nitrogen 48 (H) 50 (H) 36 (H)   Creatinine 1.20 (H) 1.27 (H) 1.24 (H)   GFR Estimate 44 (L) 41 (L) 42 (L)   GFR Estimate If Black 53 (L) 50 (L) 51 (L)   Calcium 8.6 8.6 8.5   Anion Gap 7 11 12   Albumin 3.2 (L) 3.4 3.4   Protein Total 6.8 6.8 7.0   Bilirubin Total 2.3 (H) 2.2 (H) 2.4 (H)   Alkaline Phosphatase 81 " 69 67   ALT 33 29 31   AST 18 11 15      3/29/2017 09:33   Cyclosporine Level 323     ASSESSMENT/PLAN: 71 year old female with AA, currently pancytopenic 2/2 to disease and further worsening from recent therapy    HEME- Treatment for AA 1/9-1/13/17 with ATG, methylpred, cyclosporine and eltrombopag.   -cyclosporine 50 mg BID (decreased 4/4 today) I decreased Ibrahima CSA as her kidney function, while improved, is not at baseline and her CSA levels are really quite good.  Continue weekly checks, keep level between 200-350. Next check 4/5 at [facility] (every Wednesday).   -continue 10 mg Prednisone until 4/7. I wrote out instructions 4/7-4/20 she will take 5 mg daily.  4/21-5/5 she will take 5 mg QOD.  Then stop.  -eltrombopag 150 mg daily  -Will need transfusion support 2 x week, worked on schedule today mostly at  [facility] (Sun + Wed at  [facility])  -transfuse if hgb <8 and platelets <30k     CV-two prior provoked DVT in 2012 and 2014 with travel. 2012 was associated with PE. Was on warfarin/lovenox in the past.  Transitioned to ASA which has been held in the setting of low platelets  -Norvasc stopped 1/25, BP stable    ID-  No fever or symptoms of infection. If ANC stays above 1.0 in the next couple weeks will go off ppx  -continue flucon 200 mg and levaquin 250 mg daily until I see her 4/21, then we'll stop  - CMV was neg  - inh pentamidine done today 2/24/17, next 3/24/17, overdue, will set up at [facility]    MSK- low back -Low thoracic mid spine pain.  Last CXR showed compression fractures in her vertebral bodies.  Need to get some further imaging at some point.  For now continue prn tylenol and oxycodone.  Pain is better    FEN: weight stable.  Eating OK, encouraged 1 + L hydration daily    GI: mild constipation, continue alternating senna + miralax.   -bili elevation, hemolysis was r/o, its all unconjugated- started with therapy likely 2/2 to csa   -continue ppx protonix till off pred     Sejal Walls PA-C

## 2017-04-04 NOTE — MR AVS SNAPSHOT
After Visit Summary   4/4/2017    Bonny Raymundo    MRN: [id number]           Patient Information     Date Of Birth          1945        Visit Information        Provider Department      4/4/2017 7:50 AM Celine Walls PA-C [facility]        Today's Diagnoses     Left-sided low back pain without sciatica, unspecified chronicity        Idiopathic aplastic anemia (H)        Pancytopenia (H)           Follow-ups after your visit        Your next 10 appointments already scheduled     Apr 05, 2017  8:00 AM CDT   Level 5 with SH INFUSION CHAIR 15   [facility] ([facility])    [facility] [place]  [address]  [place] [address]   [phone]            Apr 09, 2017  8:30 AM CDT   Level 5 with SH INFUSION CHAIR 14   [facility] ([facility])    [facility] [place]  [address]  [place] [address]   [phone]            Apr 12, 2017  8:30 AM CDT   Level 5 with SH INFUSION CHAIR 3   [facility] ([facility])    [facility] [place]  [address]  [place] [address]   [phone]            Apr 21, 2017  9:45 AM CDT   [facility] Lab Draw with  MASONIC LAB DRAW   [facility] Masonic Lab Draw ([facility] [place])    [address]  2nd Floor  [facility] [id number] [phone]            Apr 21, 2017 10:20 AM CDT   (Arrive by 10:05 AM)   Return Visit with CLARIBEL [facility] ([facility] [place])    [address]  2nd Floor  [facility] [id number] [phone]            Apr 23, 2017  8:30 AM CDT   Level 5 with SH INFUSION CHAIR 13   [facility] ([facility])    [facility]  [address]  [place]  "[place] [address]   [phone]            Apr 26, 2017  8:30 AM CDT   Level 5 with  INFUSION CHAIR 17   [facility] ([facility])    [facility] [place]  [address]  [place] [address]   [phone]            May 04, 2017  3:30 PM CDT   Masonic Lab Draw with  MASONIC LAB DRAW   [facility] Masonic Lab Draw ([facility])    [address]  2nd [facility] [id number] [phone]            May 04, 2017  4:00 PM CDT   RETURN ONC with Rafita Rogel MD   [facility] Blood and Marrow Transplant ([facility])    [address] [place] [id number] [phone]              Who to contact     If you have questions or need follow up information about today's clinic visit or your schedule please contact [facility] directly at [phone].  Normal or non-critical lab and imaging results will be communicated to you by [employer], letter or phone within 4 business days after the clinic has received the results. If you do not hear from us within 7 days, please contact the clinic through [employer] or phone. If you have a critical or abnormal lab result, we will notify you by phone as soon as possible.  Submit refill requests through [employer] or call your pharmacy and they will forward the refill request to us. Please allow 3 business days for your refill to be completed.          Additional Information About Your Visit        [employer] Information     [employer] lets you send messages to your doctor, view your test results, renew your prescriptions, schedule appointments and more. To sign up, go to [email or web address] . Click on \"Log in\" on the left side of the screen, which will take you to the Welcome page. Then click on \"Sign up Now\" on the right side of the page.     You will be asked to enter the access code listed below, as well as some " "personal information. Please follow the directions to create your username and password.     Your access code is: [email or web address]  Expires: 2017  1:59 PM     Your access code will  in 90 days. If you need help or a new code, please call your [facility] clinic or [phone].        Care EveryWhere ID     This is your Care EveryWhere ID. This could be used by other organizations to access your [facility] medical records  [id number]        Your Vitals Were     Pulse Temperature Respirations Height Pulse Oximetry BMI (Body Mass Index)    100 97.4  F (36.3  C) (Oral) 18 1.6 m (5' 2.99\") 96% 23.23 kg/m2       Blood Pressure from Last 3 Encounters:   17 (!) 142/91   17 (!) 137/97   17 115/70    Weight from Last 3 Encounters:   17 59.5 kg (131 lb 1.6 oz)   17 59.3 kg (130 lb 11.7 oz)   17 59.7 kg (131 lb 11.2 oz)              Today, you had the following     No orders found for display         Today's Medication Changes          These changes are accurate as of: 17 12:14 PM.  If you have any questions, ask your nurse or doctor.               These medicines have changed or have updated prescriptions.        Dose/Directions    cycloSPORINE modified 25 MG capsule   Commonly known as:  GENERIC EQUIVALENT   This may have changed:  how much to take   Used for:  Idiopathic aplastic anemia (H), Pancytopenia (H)   Changed by:  Celine Walls PA-C        Dose:  50 mg   Take 2 capsules (50 mg) by mouth 2 times daily   Quantity:  300 capsule   Refills:  3       LEVAQUIN 250 MG tablet   This may have changed:  Another medication with the same name was removed. Continue taking this medication, and follow the directions you see here.   Generic drug:  levofloxacin   Changed by:  Celine Walls PA-C        Dose:  250 mg   Take 1 tablet (250 mg) by mouth daily   Quantity:  30 tablet   Refills:  0            Where to get your medicines      Some of these will need a paper prescription " and others can be bought over the counter.  Ask your nurse if you have questions.     Bring a paper prescription for each of these medications     oxyCODONE 5 MG IR tablet                Primary Care Provider Office Phone # Fax #    Rafita Rogel [phone]        PHYSICIANS [address]  [facility] [address]        Thank you!     Thank you for choosing [facility]  for your care. Our goal is always to provide you with excellent care. Hearing back from our patients is one way we can continue to improve our services. Please take a few minutes to complete the written survey that you may receive in the mail after your visit with us. Thank you!             Your Updated Medication List - Protect others around you: Learn how to safely use, store and throw away your medicines at www.disposemymeds.org.          This list is accurate as of: 4/4/17 12:14 PM.  Always use your most recent med list.                   Brand Name Dispense Instructions for use    BENADRYL ALLERGY 25 MG tablet   Generic drug:  diphenhydrAMINE     56 tablet    Take 1 tablet (25 mg) by mouth nightly as needed       chlorpheniramine 4 MG tablet    CHLOR-TRIMETON     Take 4 mg by mouth every 6 hours as needed. For head cold       COMPRESSION STOCKINGS     2 each    Wear compression stockings at 20-30 mmHg rating most time during the day to the affected leg (left leg) or both legs. Take them off at night.       cycloSPORINE modified 25 MG capsule    GENERIC EQUIVALENT    300 capsule    Take 2 capsules (50 mg) by mouth 2 times daily       diclofenac 1 % Gel topical gel    VOLTAREN    100 g    Apply 2 g topically 2 times daily       eltrombopag 50 MG tablet    PROMACTA    90 tablet    Take 3 tablets (150 mg) by mouth daily Administer on an empty stomach, 1 hour before or 2 hours after a meal. Or as directed       * fluconazole 200 MG tablet    DIFLUCAN    30 tablet    Take 1 tablet (200 mg) by  mouth daily       * fluconazole 200 MG tablet    DIFLUCAN    30 tablet    TAKE ONE TABLET BY MOUTH EVERY DAY       LEVAQUIN 250 MG tablet   Generic drug:  levofloxacin     30 tablet    Take 1 tablet (250 mg) by mouth daily       loratadine 10 MG tablet    CLARITIN     Take 10 mg by mouth daily as needed. For head cold       nystatin 019411 UNIT/ML suspension    MYCOSTATIN    280 mL    Take 5 mLs (500,000 Units) by mouth 4 times daily Swish and spit.       order for DME     1 Box    Equipment being ordered: knee high compression stockings- 18-20 mm       oxyCODONE 5 MG IR tablet    ROXICODONE    50 tablet    Take 1 tablet (5 mg) by mouth every 6 hours as needed for moderate to severe pain       pantoprazole 40 MG EC tablet    PROTONIX    30 tablet    Take 1 tablet (40 mg) by mouth every morning       polyethylene glycol Packet    MIRALAX/GLYCOLAX    14 packet    Take 17 g by mouth daily . If you start to have loose stools/diarrhea or multiple bowel movements, ok to hold this medication. Then resume if no bowel movement after 24-48hours.       predniSONE 10 MG tablet    DELTASONE    30 tablet    Take 1 tablet (10 mg) by mouth daily for 30 doses       SENNA S 8.6-50 MG per tablet   Generic drug:  senna-docusate     100 tablet    Can take 1-2 tablets up to twice a day. Can start with 1-2 tablets daily. If no bowel movement within 24 hours, can take 1-2 tablets twice a day. HOLD if you develop diarrhea. Resume taking if no bowel movement in 24 hours.       TYLENOL PO      Take 325 mg by mouth every 6 hours as needed for mild pain or fever       * Notice:  This list has 2 medication(s) that are the same as other medications prescribed for you. Read the directions carefully, and ask your doctor or other care provider to review them with you.

## 2017-04-04 NOTE — NURSING NOTE
"Bonny Raymundo is a 71 year old female who presents for:  Chief Complaint   Patient presents with     Oncology Clinic Visit     Aplastic anemia        Initial Vitals:  BP (!) 142/91 (BP Location: Left arm, Patient Position: Chair, Cuff Size: Adult Regular)  Pulse 100  Temp 97.4  F (36.3  C) (Oral)  Resp 18  Ht 1.6 m (5' 2.99\")  Wt 59.5 kg (131 lb 1.6 oz)  SpO2 96%  BMI 23.23 kg/m2 Estimated body mass index is 23.23 kg/(m^2) as calculated from the following:    Height as of this encounter: 1.6 m (5' 2.99\").    Weight as of this encounter: 59.5 kg (131 lb 1.6 oz).. Body surface area is 1.63 meters squared. BP completed using cuff size: regular  No Pain (1) No LMP recorded. Patient has had a hysterectomy. Allergies and medications reviewed.     Medications: Medication refills not needed today.  Pharmacy name entered into [facility]:    [facility] [place], [place] - [address]  [facility] [place], [place] - [address]  ONCOLOGY RX CARE Replaced by [facility] [place], [place] - [id number] [place] [address] [facility] [address]  WRITTEN PRESCRIPTION REQUESTED    Comments:     7 minutes for nursing intake (face to face time)   Jaqui Manriquez MA        "

## 2017-04-05 ENCOUNTER — HOSPITAL ENCOUNTER (OUTPATIENT)
Facility: CLINIC | Age: 72
Setting detail: SPECIMEN
Discharge: HOME | End: 2017-04-05
Attending: PHYSICIAN ASSISTANT | Admitting: PHYSICIAN ASSISTANT
Payer: COMMERCIAL

## 2017-04-05 ENCOUNTER — INFUSION THERAPY VISIT (OUTPATIENT)
Dept: INFUSION THERAPY | Facility: CLINIC | Age: 72
End: 2017-04-05
Attending: INTERNAL MEDICINE
Payer: COMMERCIAL

## 2017-04-05 VITALS
TEMPERATURE: 97.9 F | HEART RATE: 90 BPM | RESPIRATION RATE: 18 BRPM | DIASTOLIC BLOOD PRESSURE: 94 MMHG | SYSTOLIC BLOOD PRESSURE: 132 MMHG

## 2017-04-05 DIAGNOSIS — D61.818 PANCYTOPENIA (H): ICD-10-CM

## 2017-04-05 DIAGNOSIS — D61.3 IDIOPATHIC APLASTIC ANEMIA (H): Primary | ICD-10-CM

## 2017-04-05 LAB
ABO + RH BLD: NORMAL
ABO + RH BLD: NORMAL
ALBUMIN SERPL-MCNC: 3.2 G/DL (ref 3.4–5)
ALP SERPL-CCNC: 57 U/L (ref 40–150)
ALT SERPL W P-5'-P-CCNC: 19 U/L (ref 0–50)
ANION GAP SERPL CALCULATED.3IONS-SCNC: 8 MMOL/L (ref 3–14)
AST SERPL W P-5'-P-CCNC: 30 U/L (ref 0–45)
BASOPHILS # BLD AUTO: 0 10E9/L (ref 0–0.2)
BASOPHILS NFR BLD AUTO: 0 %
BILIRUB SERPL-MCNC: 2.2 MG/DL (ref 0.2–1.3)
BLD GP AB SCN SERPL QL: NORMAL
BLOOD BANK CMNT PATIENT-IMP: NORMAL
BUN SERPL-MCNC: 42 MG/DL (ref 7–30)
CALCIUM SERPL-MCNC: 8.3 MG/DL (ref 8.5–10.1)
CHLORIDE SERPL-SCNC: 107 MMOL/L (ref 94–109)
CO2 SERPL-SCNC: 23 MMOL/L (ref 20–32)
CREAT SERPL-MCNC: 1.18 MG/DL (ref 0.52–1.04)
CYCLOSPORINE BLD LC/MS/MS-MCNC: 185 UG/L (ref 50–400)
DIFFERENTIAL METHOD BLD: ABNORMAL
EOSINOPHIL # BLD AUTO: 0 10E9/L (ref 0–0.7)
EOSINOPHIL NFR BLD AUTO: 0 %
ERYTHROCYTE [DISTWIDTH] IN BLOOD BY AUTOMATED COUNT: 27.5 % (ref 10–15)
GFR SERPL CREATININE-BSD FRML MDRD: 45 ML/MIN/1.7M2
GLUCOSE SERPL-MCNC: 93 MG/DL (ref 70–99)
HCT VFR BLD AUTO: 25.5 % (ref 35–47)
HGB BLD-MCNC: 8.6 G/DL (ref 11.7–15.7)
LYMPHOCYTES # BLD AUTO: 1.5 10E9/L (ref 0.8–5.3)
LYMPHOCYTES NFR BLD AUTO: 53 %
MCH RBC QN AUTO: 33.7 PG (ref 26.5–33)
MCHC RBC AUTO-ENTMCNC: 33.7 G/DL (ref 31.5–36.5)
MCV RBC AUTO: 100 FL (ref 78–100)
MONOCYTES # BLD AUTO: 0.2 10E9/L (ref 0–1.3)
MONOCYTES NFR BLD AUTO: 7 %
NEUTROPHILS # BLD AUTO: 1.2 10E9/L (ref 1.6–8.3)
NEUTROPHILS NFR BLD AUTO: 40 %
NRBC # BLD AUTO: 0.1 10*3/UL
NRBC BLD AUTO-RTO: 3 /100
PLATELET # BLD AUTO: 55 10E9/L (ref 150–450)
POTASSIUM SERPL-SCNC: 5.2 MMOL/L (ref 3.4–5.3)
PROT SERPL-MCNC: 6.8 G/DL (ref 6.8–8.8)
RBC # BLD AUTO: 2.55 10E12/L (ref 3.8–5.2)
SODIUM SERPL-SCNC: 138 MMOL/L (ref 133–144)
SPECIMEN EXP DATE BLD: NORMAL
TME LAST DOSE: NORMAL H
WBC # BLD AUTO: 2.9 10E9/L (ref 4–11)

## 2017-04-05 PROCEDURE — 86900 BLOOD TYPING SEROLOGIC ABO: CPT | Performed by: PHYSICIAN ASSISTANT

## 2017-04-05 PROCEDURE — 85025 COMPLETE CBC W/AUTO DIFF WBC: CPT | Performed by: PHYSICIAN ASSISTANT

## 2017-04-05 PROCEDURE — 80158 DRUG ASSAY CYCLOSPORINE: CPT | Performed by: PHYSICIAN ASSISTANT

## 2017-04-05 PROCEDURE — 86901 BLOOD TYPING SEROLOGIC RH(D): CPT | Performed by: PHYSICIAN ASSISTANT

## 2017-04-05 PROCEDURE — 36592 COLLECT BLOOD FROM PICC: CPT

## 2017-04-05 PROCEDURE — 86850 RBC ANTIBODY SCREEN: CPT | Performed by: PHYSICIAN ASSISTANT

## 2017-04-05 PROCEDURE — 80053 COMPREHEN METABOLIC PANEL: CPT | Performed by: PHYSICIAN ASSISTANT

## 2017-04-05 RX ORDER — ALBUTEROL SULFATE 5 MG/ML
2.5 SOLUTION RESPIRATORY (INHALATION) EVERY 6 HOURS PRN
Status: CANCELLED
Start: 2017-05-01

## 2017-04-05 RX ORDER — PENTAMIDINE ISETHIONATE 300 MG/300MG
300 INHALANT RESPIRATORY (INHALATION) ONCE
Status: CANCELLED
Start: 2017-05-01 | End: 2017-05-01

## 2017-04-05 NOTE — PROGRESS NOTES
Infusion Nursing Note:  Bonny Raymundo presents today for labs/possible tx.    Patient seen by provider today: No   present during visit today: Not Applicable.    Note: N/A.    Intravenous Access:  Labs drawn without difficulty.  PICC.    Treatment Conditions:  Lab Results   Component Value Date    HGB 8.6 04/05/2017     Lab Results   Component Value Date    WBC 2.9 04/05/2017      Lab Results   Component Value Date    ANEU 1.6 04/02/2017     Lab Results   Component Value Date    PLT 55 04/05/2017      Lab Results   Component Value Date     04/05/2017                   Lab Results   Component Value Date    POTASSIUM 5.2 04/05/2017           Lab Results   Component Value Date    MAG 1.4 01/25/2017            Lab Results   Component Value Date    CR 1.18 04/05/2017                   Lab Results   Component Value Date    LEO 8.3 04/05/2017                Lab Results   Component Value Date    BILITOTAL 2.2 04/05/2017           Lab Results   Component Value Date    ALBUMIN 3.2 04/05/2017                    Lab Results   Component Value Date    ALT 19 04/05/2017           Lab Results   Component Value Date    AST 30 04/05/2017     No need for any tx today for hgb 8.6 and platelet count 55,000.      Post Infusion Assessment:  Site patent and intact, free from redness, edema or discomfort.  No evidence of extravasations.    Discharge Plan:   Discharge instructions reviewed with: Patient.  Patient and/or family verbalized understanding of discharge instructions and all questions answered.  Copy of AVS reviewed with patient and/or family.  Patient will return 4/9/17 for next appointment.  Patient discharged in stable condition accompanied by: self.  Departure Mode: Ambulatory.    Shant Nieto RN

## 2017-04-05 NOTE — MR AVS SNAPSHOT
After Visit Summary   4/5/2017    Bonny Raymundo    MRN: [id number]           Patient Information     Date Of Birth          1945        Visit Information        Provider Department      4/5/2017 8:00 AM SH INFUSION CHAIR 15 [facility]        Today's Diagnoses     Idiopathic aplastic anemia (H)    -  1    Pancytopenia (H)           Follow-ups after your visit        Your next 10 appointments already scheduled     Apr 09, 2017  8:30 AM CDT   Level 5 with SH INFUSION CHAIR 14   [facility] ([facility])    [facility]  [address]  [place] [address]   [phone]            Apr 12, 2017  8:30 AM CDT   Level 5 with SH INFUSION CHAIR 3   [facility] ([facility])    [facility]  [address]  [place] [address]   [phone]            Apr 16, 2017  7:30 AM CDT   Level 5 with SH INFUSION CHAIR 14   [facility] ([facility])    [facility]  [address]  [place] [address]   [phone]            Apr 19, 2017  8:30 AM CDT   Level 5 with SH INFUSION CHAIR 17   [facility] ([facility])    [facility]  [address]  [place] [address]   [phone]            Apr 21, 2017 10:20 AM CDT   (Arrive by 10:05 AM)   Return Visit with CLARIBEL [facility] ([facility])    [address] [place] [id number] [phone]            Apr 23, 2017  8:30 AM CDT   Level 5 with SH INFUSION CHAIR 13   [facility] ([facility])    [facility]  [address]  [place] [address]   [phone]             Apr 26, 2017  8:30 AM CDT   Level 5 with SH INFUSION CHAIR 17   [facility] ([facility])    [facility] [place]  [address]  [place] [address]   [phone]            Apr 30, 2017  8:30 AM CDT   Level 5 with SH INFUSION CHAIR 15   [facility] ([facility])    [facility] [place] [address]  [place] [address]   [phone]            May 03, 2017  8:30 AM CDT   Level 5 with SH INFUSION CHAIR 3   [facility] ([facility])    [facility] [place]  [address]  [place] [address]   [phone]            May 04, 2017  4:00 PM CDT   RETURN ONC with Rafita Rogel MD   [facility] Blood and Marrow Transplant ([facility] [place] [facility])    [address] [place] [address] [phone]              Who to contact     If you have questions or need follow up information about today's clinic visit or your schedule please contact [facility] directly at [phone].  Normal or non-critical lab and imaging results will be communicated to you by "[employer]hart, letter or phone within 4 business days after the clinic has received the results. If you do not hear from us within 7 days, please contact the clinic through "[employer]hart or phone. If you have a critical or abnormal lab result, we will notify you by phone as soon as possible.  Submit refill requests through [employer] or call your pharmacy and they will forward the refill request to us. Please allow 3 business days for your refill to be completed.          Additional Information About Your Visit        [employer] Information     [employer] lets you send messages to your doctor, view your test results, renew your prescriptions, schedule appointments and more. To sign up, go to  [email or web address] . Click on \"Log in\" on the left side of the screen, which will take you to the Welcome page. Then click on \"Sign up Now\" on the right side of the page.     You will be asked to enter the access code listed below, as well as some personal information. Please follow the directions to create your username and password.     Your access code is: [email or web address]  Expires: 2017  1:59 PM     Your access code will  in 90 days. If you need help or a new code, please call your [facility] clinic or [phone].        Care EveryWhere ID     This is your Care EveryWhere ID. This could be used by other organizations to access your [facility] medical records  [id number]        Your Vitals Were     Pulse Temperature Respirations             90 97.9  F (36.6  C) (Oral) 18          Blood Pressure from Last 3 Encounters:   17 (!) 132/94   17 (!) 142/91   17 (!) 137/97    Weight from Last 3 Encounters:   17 59.5 kg (131 lb 1.6 oz)   17 59.3 kg (130 lb 11.7 oz)   17 59.7 kg (131 lb 11.2 oz)              We Performed the Following     ABO/Rh type and screen     CBC with platelets differential     Comprehensive metabolic panel     Cyclosporine     MD Instruction for Therapy Plan        Primary Care Provider Office Phone # Fax #    Rafita Rogel [phone]        PHYSICIANS [address] [facility] [address]  [facility] [address]        Thank you!     Thank you for choosing [facility] CLIN[facility] AND [facility] CENTER  for your care. Our goal is always to provide you with excellent care. Hearing back from our patients is one way we can continue to improve our services. Please take a few minutes to complete the written survey that you may receive in the mail after your visit with us. Thank you!             Your Updated Medication List - Protect others around you: Learn how to safely use, store and throw away your medicines at www.disposemymeds.org.        "   This list is accurate as of: 4/5/17  9:21 AM.  Always use your most recent med list.                   Brand Name Dispense Instructions for use    BENADRYL ALLERGY 25 MG tablet   Generic drug:  diphenhydrAMINE     56 tablet    Take 1 tablet (25 mg) by mouth nightly as needed       chlorpheniramine 4 MG tablet    CHLOR-TRIMETON     Take 4 mg by mouth every 6 hours as needed. For head cold       COMPRESSION STOCKINGS     2 each    Wear compression stockings at 20-30 mmHg rating most time during the day to the affected leg (left leg) or both legs. Take them off at night.       cycloSPORINE modified 25 MG capsule    GENERIC EQUIVALENT    300 capsule    Take 2 capsules (50 mg) by mouth 2 times daily       diclofenac 1 % Gel topical gel    VOLTAREN    100 g    Apply 2 g topically 2 times daily       eltrombopag 50 MG tablet    PROMACTA    90 tablet    Take 3 tablets (150 mg) by mouth daily Administer on an empty stomach, 1 hour before or 2 hours after a meal. Or as directed       * fluconazole 200 MG tablet    DIFLUCAN    30 tablet    Take 1 tablet (200 mg) by mouth daily       * fluconazole 200 MG tablet    DIFLUCAN    30 tablet    TAKE ONE TABLET BY MOUTH EVERY DAY       LEVAQUIN 250 MG tablet   Generic drug:  levofloxacin     30 tablet    Take 1 tablet (250 mg) by mouth daily       loratadine 10 MG tablet    CLARITIN     Take 10 mg by mouth daily as needed. For head cold       nystatin 374241 UNIT/ML suspension    MYCOSTATIN    280 mL    Take 5 mLs (500,000 Units) by mouth 4 times daily Swish and spit.       order for DME     1 Box    Equipment being ordered: knee high compression stockings- 18-20 mm       oxyCODONE 5 MG IR tablet    ROXICODONE    50 tablet    Take 1 tablet (5 mg) by mouth every 6 hours as needed for moderate to severe pain       pantoprazole 40 MG EC tablet    PROTONIX    30 tablet    Take 1 tablet (40 mg) by mouth every morning       polyethylene glycol Packet    MIRALAX/GLYCOLAX    14 packet     Take 17 g by mouth daily . If you start to have loose stools/diarrhea or multiple bowel movements, ok to hold this medication. Then resume if no bowel movement after 24-48hours.       predniSONE 10 MG tablet    DELTASONE    30 tablet    Take 1 tablet (10 mg) by mouth daily for 30 doses       SENNA S 8.6-50 MG per tablet   Generic drug:  senna-docusate     100 tablet    Can take 1-2 tablets up to twice a day. Can start with 1-2 tablets daily. If no bowel movement within 24 hours, can take 1-2 tablets twice a day. HOLD if you develop diarrhea. Resume taking if no bowel movement in 24 hours.       TYLENOL PO      Take 325 mg by mouth every 6 hours as needed for mild pain or fever       * Notice:  This list has 2 medication(s) that are the same as other medications prescribed for you. Read the directions carefully, and ask your doctor or other care provider to review them with you.

## 2017-04-06 ENCOUNTER — CARE COORDINATION (OUTPATIENT)
Dept: ONCOLOGY | Facility: CLINIC | Age: 72
End: 2017-04-06

## 2017-04-06 NOTE — PROGRESS NOTES
Called patient per the request of Celine Vera PA-C to inform her of her CSA level and to instruct her to increase her CSA to 75 MG BID.  CSA dose was dropped to 50 MG BID on 4/4/17 by Sejal but CSA level yesterday was 185 so patient needed to increase her CSA back to 75 MG BID.  Patient verbalized understanding and was grateful for the call and follow up.

## 2017-04-09 ENCOUNTER — INFUSION THERAPY VISIT (OUTPATIENT)
Dept: INFUSION THERAPY | Facility: CLINIC | Age: 72
End: 2017-04-09
Attending: INTERNAL MEDICINE
Payer: COMMERCIAL

## 2017-04-09 ENCOUNTER — HOSPITAL ENCOUNTER (OUTPATIENT)
Facility: CLINIC | Age: 72
Setting detail: SPECIMEN
Discharge: HOME | End: 2017-04-09
Attending: PHYSICIAN ASSISTANT | Admitting: PHYSICIAN ASSISTANT
Payer: COMMERCIAL

## 2017-04-09 VITALS
HEART RATE: 98 BPM | RESPIRATION RATE: 22 BRPM | TEMPERATURE: 98.2 F | DIASTOLIC BLOOD PRESSURE: 83 MMHG | OXYGEN SATURATION: 93 % | SYSTOLIC BLOOD PRESSURE: 138 MMHG

## 2017-04-09 DIAGNOSIS — D61.3 IDIOPATHIC APLASTIC ANEMIA (H): Primary | ICD-10-CM

## 2017-04-09 DIAGNOSIS — D61.818 PANCYTOPENIA (H): ICD-10-CM

## 2017-04-09 LAB
BASOPHILS # BLD AUTO: 0 10E9/L (ref 0–0.2)
BASOPHILS NFR BLD AUTO: 0.2 %
BLD PROD TYP BPU: NORMAL
BLD PROD TYP BPU: NORMAL
BLD UNIT ID BPU: 0
BLOOD PRODUCT CODE: NORMAL
BPU ID: NORMAL
DIFFERENTIAL METHOD BLD: ABNORMAL
EOSINOPHIL # BLD AUTO: 0 10E9/L (ref 0–0.7)
EOSINOPHIL NFR BLD AUTO: 1 %
ERYTHROCYTE [DISTWIDTH] IN BLOOD BY AUTOMATED COUNT: ABNORMAL % (ref 10–15)
HCT VFR BLD AUTO: 26.4 % (ref 35–47)
HGB BLD-MCNC: 8.9 G/DL (ref 11.7–15.7)
IMM GRANULOCYTES # BLD: 0 10E9/L (ref 0–0.4)
IMM GRANULOCYTES NFR BLD: 0 %
LYMPHOCYTES # BLD AUTO: 2.8 10E9/L (ref 0.8–5.3)
LYMPHOCYTES NFR BLD AUTO: 66.9 %
MCH RBC QN AUTO: 34.5 PG (ref 26.5–33)
MCHC RBC AUTO-ENTMCNC: 33.7 G/DL (ref 31.5–36.5)
MCV RBC AUTO: 102 FL (ref 78–100)
MONOCYTES # BLD AUTO: 0.3 10E9/L (ref 0–1.3)
MONOCYTES NFR BLD AUTO: 8.1 %
NEUTROPHILS # BLD AUTO: 1 10E9/L (ref 1.6–8.3)
NEUTROPHILS NFR BLD AUTO: 23.8 %
NRBC # BLD AUTO: 0.2 10*3/UL
NRBC BLD AUTO-RTO: 5 /100
NUM BPU REQUESTED: 1
PLATELET # BLD AUTO: 24 10E9/L (ref 150–450)
RBC # BLD AUTO: 2.58 10E12/L (ref 3.8–5.2)
TRANSFUSION STATUS PATIENT QL: NORMAL
TRANSFUSION STATUS PATIENT QL: NORMAL
WBC # BLD AUTO: 4.2 10E9/L (ref 4–11)

## 2017-04-09 PROCEDURE — P9037 PLATE PHERES LEUKOREDU IRRAD: HCPCS | Performed by: PHYSICIAN ASSISTANT

## 2017-04-09 PROCEDURE — 85025 COMPLETE CBC W/AUTO DIFF WBC: CPT | Performed by: PHYSICIAN ASSISTANT

## 2017-04-09 PROCEDURE — 36430 TRANSFUSION BLD/BLD COMPNT: CPT

## 2017-04-09 ASSESSMENT — PAIN SCALES - GENERAL: PAINLEVEL: MILD PAIN (2)

## 2017-04-09 NOTE — PROGRESS NOTES
Infusion Nursing Note:  Bonny Raymundo presents today for cbc with possible transfusion----one unit platelets given  Patient seen by provider today: No   present during visit today: Not Applicable.    Note: N/A.    Intravenous Access:  PICC.    Treatment Conditions:  Lab Results   Component Value Date    HGB 8.9 04/09/2017     Lab Results   Component Value Date    WBC 4.2 04/09/2017      Lab Results   Component Value Date    ANEU 1.0 04/09/2017     Lab Results   Component Value Date    PLT 24 04/09/2017      Results reviewed, labs MET treatment parameters, ok to proceed with treatment.      Post Infusion Assessment:  Patient tolerated infusion without incident.  Site patent and intact, free from redness, edema or discomfort.    Discharge Plan:   Discharge instructions reviewed with: Patient.  Patient and/or family verbalized understanding of discharge instructions and all questions answered.  Copy of AVS reviewed with patient and/or family.  Patient will return Wednesday for next appointment.  Patient discharged in stable condition accompanied by: self.    Suze Thomas RN

## 2017-04-09 NOTE — MR AVS SNAPSHOT
After Visit Summary   4/9/2017    Bonny Raymundo    MRN: [id number]           Patient Information     Date Of Birth          1945        Visit Information        Provider Department      4/9/2017 8:30 AM SH INFUSION CHAIR 14 [facility]        Today's Diagnoses     Idiopathic aplastic anemia (H)    -  1    Pancytopenia (H)           Follow-ups after your visit        Your next 10 appointments already scheduled     Apr 12, 2017  8:30 AM CDT   Level 5 with SH INFUSION CHAIR 3   [facility] ([facility])    [facility]  [address]  [place] [address]   [phone]            Apr 16, 2017  7:30 AM CDT   Level 5 with SH INFUSION CHAIR 14   [facility] ([facility])    [facility]  [address]  [place] [address]   [phone]            Apr 19, 2017  8:30 AM CDT   Level 5 with SH INFUSION CHAIR 17   [facility] ([facility])    [facility]  [address]  [place] [address]   [phone]            Apr 21, 2017 10:20 AM CDT   (Arrive by 10:05 AM)   Return Visit with CLARIBEL [facility] ([facility])    [address] [place] [address]   [phone]            Apr 23, 2017  8:30 AM CDT   Level 5 with SH INFUSION CHAIR 13   [facility] ([facility])    [facility]  [address]  [place] [address]   [phone]            Apr 26, 2017  8:30 AM CDT   Level 5 with SH INFUSION CHAIR 17   [facility] ([facility])    [facility]  [address]  [place] [address]   [phone]             Apr 30, 2017  8:30 AM CDT   Level 5 with  INFUSION CHAIR 15   [facility] ([facility])    [facility] [place]  [address]  [place] [address]   [phone]            May 03, 2017  8:30 AM CDT   Level 5 with SH INFUSION CHAIR 3   [facility] ([facility])    [facility] [place]  [address]  [place] [address]   [phone]            May 04, 2017  4:00 PM CDT   RETURN ONC with Rafita Rogel MD   [facility] ([facility])    [address] [place] [address] [phone]              Future tests that were ordered for you today     Open Standing Orders        Priority Remaining Interval Expires Ordered    Platelets prepare order unit Routine 99/100 CONDITIONAL (SPECIFY) BLOOD  4/9/2017    Transfuse platelets unit Routine 99/100 TRANSFUSE 1 DOSE  4/9/2017            Who to contact     If you have questions or need follow up information about today's clinic visit or your schedule please contact [facility] directly at [phone].  Normal or non-critical lab and imaging results will be communicated to you by [facility], letter or phone within 4 business days after the clinic has received the results. If you do not hear from us within 7 days, please contact the clinic through Brighterg[employer] or phone. If you have a critical or abnormal lab result, we will notify you by phone as soon as possible.  Submit refill requests through Flowdock or call your pharmacy and they will forward the refill request to us. Please allow 3 business days for your refill to be completed.          Additional Information About Your Visit        Flowdock Information     Flowdock lets you send messages to your doctor, view your test results, renew your prescriptions, schedule appointments  "and more. To sign up, go to [email or web address] . Click on \"Log in\" on the left side of the screen, which will take you to the Welcome page. Then click on \"Sign up Now\" on the right side of the page.     You will be asked to enter the access code listed below, as well as some personal information. Please follow the directions to create your username and password.     Your access code is: [email or web address]  Expires: 2017  1:59 PM     Your access code will  in 90 days. If you need help or a new code, please call your [place] clinic or [phone].        Care EveryWhere ID     This is your Care EveryWhere ID. This could be used by other organizations to access your [place] medical records  [id number]        Your Vitals Were     Pulse Temperature Respirations Pulse Oximetry          98 98.2  F (36.8  C) (Oral) 22 93%         Blood Pressure from Last 3 Encounters:   17 138/83   17 (!) 132/94   17 (!) 142/91    Weight from Last 3 Encounters:   17 59.5 kg (131 lb 1.6 oz)   17 59.3 kg (130 lb 11.7 oz)   17 59.7 kg (131 lb 11.2 oz)              We Performed the Following     Blood component     CBC with platelets differential     MD Instruction for Therapy Plan     Platelets prepare order unit     Transfuse platelets unit        Primary Care Provider Office Phone # Fax #    Rafita Rogel [phone]        PHYSICIANS [address] [facility] [address]  [facility] [address]        Thank you!     Thank you for choosing [facility]  for your care. Our goal is always to provide you with excellent care. Hearing back from our patients is one way we can continue to improve our services. Please take a few minutes to complete the written survey that you may receive in the mail after your visit with us. Thank you!             Your Updated Medication List - Protect others around you: Learn how to safely use, store and throw away your " medicines at www.disposemymeds.org.          This list is accurate as of: 4/9/17 11:10 AM.  Always use your most recent med list.                   Brand Name Dispense Instructions for use    BENADRYL ALLERGY 25 MG tablet   Generic drug:  diphenhydrAMINE     56 tablet    Take 1 tablet (25 mg) by mouth nightly as needed       chlorpheniramine 4 MG tablet    CHLOR-TRIMETON     Take 4 mg by mouth every 6 hours as needed. For head cold       COMPRESSION STOCKINGS     2 each    Wear compression stockings at 20-30 mmHg rating most time during the day to the affected leg (left leg) or both legs. Take them off at night.       cycloSPORINE modified 25 MG capsule    GENERIC EQUIVALENT    300 capsule    Take 2 capsules (50 mg) by mouth 2 times daily       diclofenac 1 % Gel topical gel    VOLTAREN    100 g    Apply 2 g topically 2 times daily       eltrombopag 50 MG tablet    PROMACTA    90 tablet    Take 3 tablets (150 mg) by mouth daily Administer on an empty stomach, 1 hour before or 2 hours after a meal. Or as directed       * fluconazole 200 MG tablet    DIFLUCAN    30 tablet    Take 1 tablet (200 mg) by mouth daily       * fluconazole 200 MG tablet    DIFLUCAN    30 tablet    TAKE ONE TABLET BY MOUTH EVERY DAY       LEVAQUIN 250 MG tablet   Generic drug:  levofloxacin     30 tablet    Take 1 tablet (250 mg) by mouth daily       loratadine 10 MG tablet    CLARITIN     Take 10 mg by mouth daily as needed. For head cold       nystatin 304923 UNIT/ML suspension    MYCOSTATIN    280 mL    Take 5 mLs (500,000 Units) by mouth 4 times daily Swish and spit.       order for DME     1 Box    Equipment being ordered: knee high compression stockings- 18-20 mm       oxyCODONE 5 MG IR tablet    ROXICODONE    50 tablet    Take 1 tablet (5 mg) by mouth every 6 hours as needed for moderate to severe pain       pantoprazole 40 MG EC tablet    PROTONIX    30 tablet    Take 1 tablet (40 mg) by mouth every morning       polyethylene glycol  Packet    MIRALAX/GLYCOLAX    14 packet    Take 17 g by mouth daily . If you start to have loose stools/diarrhea or multiple bowel movements, ok to hold this medication. Then resume if no bowel movement after 24-48hours.       predniSONE 10 MG tablet    DELTASONE    30 tablet    Take 1 tablet (10 mg) by mouth daily for 30 doses       SENNA S 8.6-50 MG per tablet   Generic drug:  senna-docusate     100 tablet    Can take 1-2 tablets up to twice a day. Can start with 1-2 tablets daily. If no bowel movement within 24 hours, can take 1-2 tablets twice a day. HOLD if you develop diarrhea. Resume taking if no bowel movement in 24 hours.       TYLENOL PO      Take 325 mg by mouth every 6 hours as needed for mild pain or fever       * Notice:  This list has 2 medication(s) that are the same as other medications prescribed for you. Read the directions carefully, and ask your doctor or other care provider to review them with you.

## 2017-04-12 ENCOUNTER — INFUSION THERAPY VISIT (OUTPATIENT)
Dept: INFUSION THERAPY | Facility: CLINIC | Age: 72
End: 2017-04-12
Attending: INTERNAL MEDICINE
Payer: COMMERCIAL

## 2017-04-12 ENCOUNTER — TELEPHONE (OUTPATIENT)
Dept: ONCOLOGY | Facility: CLINIC | Age: 72
End: 2017-04-12

## 2017-04-12 ENCOUNTER — HOSPITAL ENCOUNTER (OUTPATIENT)
Facility: CLINIC | Age: 72
Setting detail: SPECIMEN
Discharge: HOME | End: 2017-04-12
Attending: PHYSICIAN ASSISTANT | Admitting: PHYSICIAN ASSISTANT
Payer: COMMERCIAL

## 2017-04-12 VITALS
RESPIRATION RATE: 16 BRPM | TEMPERATURE: 97.8 F | SYSTOLIC BLOOD PRESSURE: 133 MMHG | DIASTOLIC BLOOD PRESSURE: 71 MMHG | OXYGEN SATURATION: 94 %

## 2017-04-12 DIAGNOSIS — D61.818 PANCYTOPENIA (H): ICD-10-CM

## 2017-04-12 DIAGNOSIS — D61.3 IDIOPATHIC APLASTIC ANEMIA (H): Primary | ICD-10-CM

## 2017-04-12 DIAGNOSIS — M54.50 LEFT-SIDED LOW BACK PAIN WITHOUT SCIATICA, UNSPECIFIED CHRONICITY: ICD-10-CM

## 2017-04-12 LAB
ALBUMIN SERPL-MCNC: 3.3 G/DL (ref 3.4–5)
ALP SERPL-CCNC: 63 U/L (ref 40–150)
ALT SERPL W P-5'-P-CCNC: 22 U/L (ref 0–50)
ANION GAP SERPL CALCULATED.3IONS-SCNC: 12 MMOL/L (ref 3–14)
AST SERPL W P-5'-P-CCNC: 14 U/L (ref 0–45)
BASOPHILS # BLD AUTO: 0 10E9/L (ref 0–0.2)
BASOPHILS NFR BLD AUTO: 0 %
BILIRUB SERPL-MCNC: 2.2 MG/DL (ref 0.2–1.3)
BUN SERPL-MCNC: 42 MG/DL (ref 7–30)
CALCIUM SERPL-MCNC: 8.7 MG/DL (ref 8.5–10.1)
CHLORIDE SERPL-SCNC: 108 MMOL/L (ref 94–109)
CO2 SERPL-SCNC: 22 MMOL/L (ref 20–32)
CREAT SERPL-MCNC: 1.25 MG/DL (ref 0.52–1.04)
CYCLOSPORINE BLD LC/MS/MS-MCNC: 190 UG/L (ref 50–400)
DIFFERENTIAL METHOD BLD: ABNORMAL
EOSINOPHIL # BLD AUTO: 0 10E9/L (ref 0–0.7)
EOSINOPHIL NFR BLD AUTO: 1.1 %
ERYTHROCYTE [DISTWIDTH] IN BLOOD BY AUTOMATED COUNT: ABNORMAL % (ref 10–15)
GFR SERPL CREATININE-BSD FRML MDRD: 42 ML/MIN/1.7M2
GLUCOSE SERPL-MCNC: 123 MG/DL (ref 70–99)
HCT VFR BLD AUTO: 24.8 % (ref 35–47)
HGB BLD-MCNC: 8.5 G/DL (ref 11.7–15.7)
IMM GRANULOCYTES # BLD: 0 10E9/L (ref 0–0.4)
IMM GRANULOCYTES NFR BLD: 0.3 %
LYMPHOCYTES # BLD AUTO: 2.2 10E9/L (ref 0.8–5.3)
LYMPHOCYTES NFR BLD AUTO: 59 %
MCH RBC QN AUTO: 35.6 PG (ref 26.5–33)
MCHC RBC AUTO-ENTMCNC: 34.3 G/DL (ref 31.5–36.5)
MCV RBC AUTO: 104 FL (ref 78–100)
MONOCYTES # BLD AUTO: 0.4 10E9/L (ref 0–1.3)
MONOCYTES NFR BLD AUTO: 10.3 %
NEUTROPHILS # BLD AUTO: 1.1 10E9/L (ref 1.6–8.3)
NEUTROPHILS NFR BLD AUTO: 29.3 %
NRBC # BLD AUTO: 0.1 10*3/UL
NRBC BLD AUTO-RTO: 3 /100
PLATELET # BLD AUTO: 34 10E9/L (ref 150–450)
POTASSIUM SERPL-SCNC: 3.8 MMOL/L (ref 3.4–5.3)
PROT SERPL-MCNC: 6.8 G/DL (ref 6.8–8.8)
RBC # BLD AUTO: 2.39 10E12/L (ref 3.8–5.2)
SODIUM SERPL-SCNC: 142 MMOL/L (ref 133–144)
TME LAST DOSE: NORMAL H
WBC # BLD AUTO: 3.7 10E9/L (ref 4–11)

## 2017-04-12 PROCEDURE — 80158 DRUG ASSAY CYCLOSPORINE: CPT | Performed by: PHYSICIAN ASSISTANT

## 2017-04-12 PROCEDURE — 85025 COMPLETE CBC W/AUTO DIFF WBC: CPT | Performed by: PHYSICIAN ASSISTANT

## 2017-04-12 PROCEDURE — 80053 COMPREHEN METABOLIC PANEL: CPT | Performed by: PHYSICIAN ASSISTANT

## 2017-04-12 PROCEDURE — 36592 COLLECT BLOOD FROM PICC: CPT

## 2017-04-12 RX ORDER — OXYCODONE HYDROCHLORIDE 5 MG/1
5 TABLET ORAL EVERY 6 HOURS PRN
Qty: 50 TABLET | Refills: 0 | Status: SHIPPED | OUTPATIENT
Start: 2017-04-12 | End: 2017-08-02

## 2017-04-12 ASSESSMENT — PAIN SCALES - GENERAL: PAINLEVEL: MILD PAIN (2)

## 2017-04-12 NOTE — PROGRESS NOTES
Infusion Nursing Note:  Bonny Raymundo presents today for labs and possible transfusions.    Patient seen by provider today: No   present during visit today: Not Applicable.    Note: N/A.    Intravenous Access:  PICC.    Treatment Conditions:  Lab Results   Component Value Date    HGB 8.5 04/12/2017     Lab Results   Component Value Date    WBC 3.7 04/12/2017      Lab Results   Component Value Date    ANEU 1.1 04/12/2017     Lab Results   Component Value Date    PLT 34 04/12/2017      Results reviewed, labs did NOT meet treatment parameters: NO TRANSFUSIONS NEEDED TODAY.      Post Infusion Assessment:  Site patent and intact, free from redness, edema or discomfort.    Discharge Plan:   [facility].  Patient will return 4/16/17 for next appointment.   Patient discharged in stable condition accompanied by: self.  Departure Mode: Ambulatory.    James Parikh RN

## 2017-04-12 NOTE — TELEPHONE ENCOUNTER
"Pt came in today for her lab draw & mentioned that she had requested an new Oxycodone Rx last week from [facility] at the [facility] but did not receive the hard copy Rx.    In Epic there is an Rx for on 4-4-17 by DOROTA Walls that \"local printed\" but Pt reports not receiving a hard copy yet & her [facility] has not yet received one.     approved & signed a new Oxycodone Rx & it was given to Pt today.     Updated [facility] for DOROTA Walls.  "

## 2017-04-12 NOTE — MR AVS SNAPSHOT
After Visit Summary   4/12/2017    Bonny Raymundo    MRN: [id number]           Patient Information     Date Of Birth          1945        Visit Information        Provider Department      4/12/2017 8:30 AM SH INFUSION CHAIR 3 [facility]        Today's Diagnoses     Idiopathic aplastic anemia (H)    -  1    Pancytopenia (H)        Left-sided low back pain without sciatica, unspecified chronicity           Follow-ups after your visit        Follow-up notes from your care team     Return in 4 days (on 4/16/2017).      Your next 10 appointments already scheduled     Apr 16, 2017  7:30 AM CDT   Level 5 with SH INFUSION CHAIR 14   [facility] ([facility])    [facility]  [address]  [place] [address]   [phone]            Apr 19, 2017  8:30 AM CDT   Level 5 with SH INFUSION CHAIR 17   [facility] ([facility])    [facility] [place]  [address]  [place] [address]   [phone]            Apr 21, 2017 10:20 AM CDT   (Arrive by 10:05 AM)   Return Visit with CLARIBEL [facility] ([facility])    [address] [place] [address]   [phone]            Apr 23, 2017  8:30 AM CDT   Level 5 with SH INFUSION CHAIR 13   [facility] ([facility])    [facility]  [address]  [place] [address]   [phone]            Apr 26, 2017  8:30 AM CDT   Level 5 with SH INFUSION CHAIR 17   [facility] ([facility])    [facility]  [address]  [place] [address]   [phone]            Apr 30, 2017  8:30 AM CDT   Level 5 with SH INFUSION CHAIR 15   [facility]  "[facility]fusion Center ([facility])    [facility] [place]  [address]  [place] [address]   [phone]            May 03, 2017  8:30 AM CDT   Level 5 with  INFUSION CHAIR 3   [facility] ([facility])    [facility] [place]  [address]  [place] [address]   [phone]            May 04, 2017  4:00 PM CDT   RETURN ONC with Rafita Rogel MD   [facility] Blood and Marrow Transplant ([facility])    [address]  [address] Floor  [facility] [address] [phone]              Who to contact     If you have questions or need follow up information about today's clinic visit or your schedule please contact [facility] AND INFUSION CENTER directly at [phone].  Normal or non-critical lab and imaging results will be communicated to you by [employer], letter or phone within 4 business days after the clinic has received the results. If you do not hear from us within 7 days, please contact the clinic through PhilSmilet or phone. If you have a critical or abnormal lab result, we will notify you by phone as soon as possible.  Submit refill requests through Airpersons or call your pharmacy and they will forward the refill request to us. Please allow 3 business days for your refill to be completed.          Additional Information About Your Visit        [employer] Information     Airpersons lets you send messages to your doctor, view your test results, renew your prescriptions, schedule appointments and more. To sign up, go to [email or web address] . Click on \"Log in\" on the left side of the screen, which will take you to the Welcome page. Then click on \"Sign up Now\" on the right side of the page.     You will be asked to enter the access code listed below, as well as some personal information. Please follow the directions to create your username and password.     Your " access code is: [email or web address]  Expires: 2017  1:59 PM     Your access code will  in 90 days. If you need help or a new code, please call your [facility] or [phone].        Care EveryWhere ID     This is your Care EveryWhere ID. This could be used by other organizations to access your [place] medical records  [id number]        Your Vitals Were     Temperature Respirations Pulse Oximetry             97.8  F (36.6  C) (Oral) 16 94%          Blood Pressure from Last 3 Encounters:   17 133/71   17 138/83   17 (!) 132/94    Weight from Last 3 Encounters:   17 59.5 kg (131 lb 1.6 oz)   17 59.3 kg (130 lb 11.7 oz)   17 59.7 kg (131 lb 11.2 oz)              We Performed the Following     CBC with platelets differential     Comprehensive metabolic panel     Cyclosporine     MD Instruction for Therapy Plan          Where to get your medicines      Some of these will need a paper prescription and others can be bought over the counter.  Ask your nurse if you have questions.     Bring a paper prescription for each of these medications     oxyCODONE 5 MG IR tablet          Primary Care Provider Office Phone # Fax #    Rafita Rogel [phone]        PHYSICIANS [address] [facility] [address]  [facility] [address]        Thank you!     Thank you for choosing [facility]ENTER  for your care. Our goal is always to provide you with excellent care. Hearing back from our patients is one way we can continue to improve our services. Please take a few minutes to complete the written survey that you may receive in the mail after your visit with us. Thank you!             Your Updated Medication List - Protect others around you: Learn how to safely use, store and throw away your medicines at www.disposemymeds.org.          This list is accurate as of: 17 10:45 AM.  Always use your most recent med list.                   Brand Name  Dispense Instructions for use    BENADRYL ALLERGY 25 MG tablet   Generic drug:  diphenhydrAMINE     56 tablet    Take 1 tablet (25 mg) by mouth nightly as needed       chlorpheniramine 4 MG tablet    CHLOR-TRIMETON     Take 4 mg by mouth every 6 hours as needed. For head cold       COMPRESSION STOCKINGS     2 each    Wear compression stockings at 20-30 mmHg rating most time during the day to the affected leg (left leg) or both legs. Take them off at night.       cycloSPORINE modified 25 MG capsule    GENERIC EQUIVALENT    300 capsule    Take 75 mg by mouth 2 times daily       diclofenac 1 % Gel topical gel    VOLTAREN    100 g    Apply 2 g topically 2 times daily       eltrombopag 50 MG tablet    PROMACTA    90 tablet    Take 3 tablets (150 mg) by mouth daily Administer on an empty stomach, 1 hour before or 2 hours after a meal. Or as directed       * fluconazole 200 MG tablet    DIFLUCAN    30 tablet    Take 1 tablet (200 mg) by mouth daily       * fluconazole 200 MG tablet    DIFLUCAN    30 tablet    TAKE ONE TABLET BY MOUTH EVERY DAY       LEVAQUIN 250 MG tablet   Generic drug:  levofloxacin     30 tablet    Take 1 tablet (250 mg) by mouth daily       loratadine 10 MG tablet    CLARITIN     Take 10 mg by mouth daily as needed. For head cold       nystatin 268988 UNIT/ML suspension    MYCOSTATIN    280 mL    Take 5 mLs (500,000 Units) by mouth 4 times daily Swish and spit.       order for DME     1 Box    Equipment being ordered: knee high compression stockings- 18-20 mm       oxyCODONE 5 MG IR tablet    ROXICODONE    50 tablet    Take 1 tablet (5 mg) by mouth every 6 hours as needed for moderate to severe pain       pantoprazole 40 MG EC tablet    PROTONIX    30 tablet    Take 1 tablet (40 mg) by mouth every morning       polyethylene glycol Packet    MIRALAX/GLYCOLAX    14 packet    Take 17 g by mouth daily . If you start to have loose stools/diarrhea or multiple bowel movements, ok to hold this medication. Then  resume if no bowel movement after 24-48hours.       predniSONE 10 MG tablet    DELTASONE    30 tablet    Take 1 tablet (10 mg) by mouth daily for 30 doses       SENNA S 8.6-50 MG per tablet   Generic drug:  senna-docusate     100 tablet    Can take 1-2 tablets up to twice a day. Can start with 1-2 tablets daily. If no bowel movement within 24 hours, can take 1-2 tablets twice a day. HOLD if you develop diarrhea. Resume taking if no bowel movement in 24 hours.       TYLENOL PO      Take 325 mg by mouth every 6 hours as needed for mild pain or fever       * Notice:  This list has 2 medication(s) that are the same as other medications prescribed for you. Read the directions carefully, and ask your doctor or other care provider to review them with you.

## 2017-04-13 ENCOUNTER — CARE COORDINATION (OUTPATIENT)
Dept: ONCOLOGY | Facility: CLINIC | Age: 72
End: 2017-04-13

## 2017-04-13 NOTE — PROGRESS NOTES
Reason for Outgoing call:    Returning patient's call.     Patients Questions/Concerns:    Patient had called in to inform us that she started to have these gagging refluxes.  She stated that it was manageable but wanted to inform us about it.     Nursing Action/Patient Instruction:    Called patient back to further follow up and assess symptoms.  Patient did not answer. Noted that she is on Protonix.  Left detailed message for patient asking her to call back into triage for further assessment.     Patient Response/Evaluation:    None.

## 2017-04-16 ENCOUNTER — HOSPITAL ENCOUNTER (OUTPATIENT)
Facility: CLINIC | Age: 72
Setting detail: SPECIMEN
Discharge: HOME | End: 2017-04-16
Attending: PHYSICIAN ASSISTANT | Admitting: PHYSICIAN ASSISTANT
Payer: COMMERCIAL

## 2017-04-16 ENCOUNTER — INFUSION THERAPY VISIT (OUTPATIENT)
Dept: INFUSION THERAPY | Facility: CLINIC | Age: 72
End: 2017-04-16
Attending: INTERNAL MEDICINE
Payer: COMMERCIAL

## 2017-04-16 VITALS
DIASTOLIC BLOOD PRESSURE: 83 MMHG | TEMPERATURE: 97.6 F | SYSTOLIC BLOOD PRESSURE: 136 MMHG | HEART RATE: 86 BPM | RESPIRATION RATE: 16 BRPM | OXYGEN SATURATION: 95 %

## 2017-04-16 DIAGNOSIS — D61.3 IDIOPATHIC APLASTIC ANEMIA (H): Primary | ICD-10-CM

## 2017-04-16 DIAGNOSIS — D61.818 PANCYTOPENIA (H): ICD-10-CM

## 2017-04-16 LAB
BASOPHILS # BLD AUTO: 0 10E9/L (ref 0–0.2)
BASOPHILS NFR BLD AUTO: 0 %
BLD PROD TYP BPU: NORMAL
BLD PROD TYP BPU: NORMAL
BLD UNIT ID BPU: 0
BLOOD PRODUCT CODE: NORMAL
BPU ID: NORMAL
DIFFERENTIAL METHOD BLD: ABNORMAL
EOSINOPHIL # BLD AUTO: 0 10E9/L (ref 0–0.7)
EOSINOPHIL NFR BLD AUTO: 1 %
ERYTHROCYTE [DISTWIDTH] IN BLOOD BY AUTOMATED COUNT: ABNORMAL % (ref 10–15)
HCT VFR BLD AUTO: 24.1 % (ref 35–47)
HGB BLD-MCNC: 8.3 G/DL (ref 11.7–15.7)
LYMPHOCYTES # BLD AUTO: 2.2 10E9/L (ref 0.8–5.3)
LYMPHOCYTES NFR BLD AUTO: 58 %
MCH RBC QN AUTO: 35.8 PG (ref 26.5–33)
MCHC RBC AUTO-ENTMCNC: 34.4 G/DL (ref 31.5–36.5)
MCV RBC AUTO: 104 FL (ref 78–100)
MONOCYTES # BLD AUTO: 0.2 10E9/L (ref 0–1.3)
MONOCYTES NFR BLD AUTO: 5 %
NEUTROPHILS # BLD AUTO: 1.4 10E9/L (ref 1.6–8.3)
NEUTROPHILS NFR BLD AUTO: 36 %
NRBC # BLD AUTO: 0.2 10*3/UL
NRBC BLD AUTO-RTO: 5 /100
NUM BPU REQUESTED: 1
PLATELET # BLD AUTO: 18 10E9/L (ref 150–450)
PLATELET # BLD EST: ABNORMAL 10*3/UL
RBC # BLD AUTO: 2.32 10E12/L (ref 3.8–5.2)
RBC INCLUSIONS BLD: SLIGHT
TRANSFUSION STATUS PATIENT QL: NORMAL
TRANSFUSION STATUS PATIENT QL: NORMAL
WBC # BLD AUTO: 3.8 10E9/L (ref 4–11)

## 2017-04-16 PROCEDURE — 36430 TRANSFUSION BLD/BLD COMPNT: CPT

## 2017-04-16 PROCEDURE — 85025 COMPLETE CBC W/AUTO DIFF WBC: CPT | Performed by: PHYSICIAN ASSISTANT

## 2017-04-16 PROCEDURE — P9037 PLATE PHERES LEUKOREDU IRRAD: HCPCS | Performed by: PHYSICIAN ASSISTANT

## 2017-04-16 ASSESSMENT — PAIN SCALES - GENERAL: PAINLEVEL: MILD PAIN (2)

## 2017-04-16 NOTE — MR AVS SNAPSHOT
After Visit Summary   4/16/2017    Bonny Raymundo    MRN: [id number]           Patient Information     Date Of Birth          1945        Visit Information        Provider Department      4/16/2017 7:30 AM SH INFUSION CHAIR 14 [facility]        Today's Diagnoses     Idiopathic aplastic anemia (H)    -  1    Pancytopenia (H)           Follow-ups after your visit        Your next 10 appointments already scheduled     Apr 19, 2017  8:30 AM CDT   Level 5 with SH INFUSION CHAIR 17   [facility] ([facility])    [facility]  [address]  [place] [address]   [phone]            Apr 21, 2017 10:20 AM CDT   (Arrive by 10:05 AM)   Return Visit with CLARIBEL Grant [facility] ([facility])    [address] [place] [address]   [phone]            Apr 23, 2017  8:30 AM CDT   Level 5 with SH INFUSION CHAIR 13   [facility] ([facility])    [facility]  [address]  [place] [address]   [phone]            Apr 26, 2017  8:30 AM CDT   Level 5 with SH INFUSION CHAIR 17   [facility] ([facility])    [facility]  [address]  [place] [address]   [phone]            Apr 30, 2017  8:30 AM CDT   Level 5 with SH INFUSION CHAIR 15   [facility] ([facility])    [facility]  [address]  [place] [address]   [phone]            May 03, 2017  8:30 AM CDT   Level 5 with SH INFUSION CHAIR 3   [facility] ([facility])    [facility]  [address]  [place] [address]   [phone]          "   May 04, 2017  4:00 PM CDT   RETURN ONC with Rafita Rogel MD   [facility] ([facility])    [address]  [address] [facility] [address] [phone]              Future tests that were ordered for you today     Open Standing Orders        Priority Remaining Interval Expires Ordered    Platelets prepare order unit Routine 99/100 CONDITIONAL (SPECIFY) BLOOD  2017    Transfuse platelets unit Routine 99/100 TRANSFUSE 1 DOSE  2017            Who to contact     If you have questions or need follow up information about today's clinic visit or your schedule please contact [facility] directly at [phone].  Normal or non-critical lab and imaging results will be communicated to you by Apartment Listhart, letter or phone within 4 business days after the clinic has received the results. If you do not hear from us within 7 days, please contact the clinic through Devshopt or phone. If you have a critical or abnormal lab result, we will notify you by phone as soon as possible.  Submit refill requests through "[employer] or call your pharmacy and they will forward the refill request to us. Please allow 3 business days for your refill to be completed.          Additional Information About Your Visit        Apartment ListharDigital Dandelion Information     "[employer] lets you send messages to your doctor, view your test results, renew your prescriptions, schedule appointments and more. To sign up, go to [email or web address] . Click on \"Log in\" on the left side of the screen, which will take you to the Welcome page. Then click on \"Sign up Now\" on the right side of the page.     You will be asked to enter the access code listed below, as well as some personal information. Please follow the directions to create your username and password.     Your access code is: [email or web address]  Expires: 2017  1:59 PM     Your access code will  in 90 days. If you " need help or a new code, please call your [facility] or [phone].        Care EveryWhere ID     This is your Care EveryWhere ID. This could be used by other organizations to access your [facility] medical records  [id number]        Your Vitals Were     Pulse Temperature Respirations Pulse Oximetry          86 97.6  F (36.4  C) (Oral) 16 95%         Blood Pressure from Last 3 Encounters:   04/16/17 136/83   04/12/17 133/71   04/09/17 138/83    Weight from Last 3 Encounters:   04/04/17 59.5 kg (131 lb 1.6 oz)   03/09/17 59.3 kg (130 lb 11.7 oz)   03/03/17 59.7 kg (131 lb 11.2 oz)              We Performed the Following     Blood component     CBC with platelets differential     Obtain affirmation of informed consent     Platelets prepare order unit     Transfuse platelets unit        Primary Care Provider Office Phone # Fax #    Rafita Rogel [phone]        PHYSICIANS [address]  [facility] [address]        Thank you!     Thank you for choosing [facility]  for your care. Our goal is always to provide you with excellent care. Hearing back from our patients is one way we can continue to improve our services. Please take a few minutes to complete the written survey that you may receive in the mail after your visit with us. Thank you!             Your Updated Medication List - Protect others around you: Learn how to safely use, store and throw away your medicines at www.disposemymeds.org.          This list is accurate as of: 4/16/17 11:18 AM.  Always use your most recent med list.                   Brand Name Dispense Instructions for use    BENADRYL ALLERGY 25 MG tablet   Generic drug:  diphenhydrAMINE     56 tablet    Take 1 tablet (25 mg) by mouth nightly as needed       chlorpheniramine 4 MG tablet    CHLOR-TRIMETON     Take 4 mg by mouth every 6 hours as needed. For head cold       COMPRESSION STOCKINGS     2 each    Wear  compression stockings at 20-30 mmHg rating most time during the day to the affected leg (left leg) or both legs. Take them off at night.       cycloSPORINE modified 25 MG capsule    GENERIC EQUIVALENT    300 capsule    Take 75 mg by mouth 2 times daily       diclofenac 1 % Gel topical gel    VOLTAREN    100 g    Apply 2 g topically 2 times daily       eltrombopag 50 MG tablet    PROMACTA    90 tablet    Take 3 tablets (150 mg) by mouth daily Administer on an empty stomach, 1 hour before or 2 hours after a meal. Or as directed       * fluconazole 200 MG tablet    DIFLUCAN    30 tablet    Take 1 tablet (200 mg) by mouth daily       * fluconazole 200 MG tablet    DIFLUCAN    30 tablet    TAKE ONE TABLET BY MOUTH EVERY DAY       LEVAQUIN 250 MG tablet   Generic drug:  levofloxacin     30 tablet    Take 1 tablet (250 mg) by mouth daily       loratadine 10 MG tablet    CLARITIN     Take 10 mg by mouth daily as needed. For head cold       nystatin 322066 UNIT/ML suspension    MYCOSTATIN    280 mL    Take 5 mLs (500,000 Units) by mouth 4 times daily Swish and spit.       order for DME     1 Box    Equipment being ordered: knee high compression stockings- 18-20 mm       oxyCODONE 5 MG IR tablet    ROXICODONE    50 tablet    Take 1 tablet (5 mg) by mouth every 6 hours as needed for moderate to severe pain       pantoprazole 40 MG EC tablet    PROTONIX    30 tablet    Take 1 tablet (40 mg) by mouth every morning       polyethylene glycol Packet    MIRALAX/GLYCOLAX    14 packet    Take 17 g by mouth daily . If you start to have loose stools/diarrhea or multiple bowel movements, ok to hold this medication. Then resume if no bowel movement after 24-48hours.       predniSONE 10 MG tablet    DELTASONE    30 tablet    Take 1 tablet (10 mg) by mouth daily for 30 doses       SENNA S 8.6-50 MG per tablet   Generic drug:  senna-docusate     100 tablet    Can take 1-2 tablets up to twice a day. Can start with 1-2 tablets daily. If no  bowel movement within 24 hours, can take 1-2 tablets twice a day. HOLD if you develop diarrhea. Resume taking if no bowel movement in 24 hours.       TYLENOL PO      Take 325 mg by mouth every 6 hours as needed for mild pain or fever       * Notice:  This list has 2 medication(s) that are the same as other medications prescribed for you. Read the directions carefully, and ask your doctor or other care provider to review them with you.

## 2017-04-16 NOTE — PROGRESS NOTES
Infusion Nursing Note:  Bonny Raymundo presents today for labs/poss. tx.    Patient seen by provider today: No   present during visit today: Not Applicable.    Note: N/A.    Intravenous Access:  Labs drawn without difficulty.  PICC.  Dressing changed per protocol.     Treatment Conditions:  Lab Results   Component Value Date    HGB 8.3 04/16/2017     Lab Results   Component Value Date    WBC 3.8 04/16/2017      Lab Results   Component Value Date    ANEU 1.4 04/16/2017     Lab Results   Component Value Date    PLT 18 04/16/2017      Blood transfusion consent signed 10/6/16.  Patient will receive 1 dose of platelets for platelet count of 18,000.      Post Infusion Assessment:  Patient tolerated infusion without incident.  Blood return noted pre and post infusion.  Site patent and intact, free from redness, edema or discomfort.  No evidence of extravasations.    Discharge Plan:   Copy of AVS reviewed with patient and/or family. Patient discharged in stable condition accompanied by: self.  Departure Mode: Ambulatory.    ANABELLE Morfin RN

## 2017-04-19 ENCOUNTER — INFUSION THERAPY VISIT (OUTPATIENT)
Dept: INFUSION THERAPY | Facility: CLINIC | Age: 72
End: 2017-04-19
Attending: INTERNAL MEDICINE
Payer: COMMERCIAL

## 2017-04-19 ENCOUNTER — HOSPITAL ENCOUNTER (OUTPATIENT)
Facility: CLINIC | Age: 72
Setting detail: SPECIMEN
Discharge: HOME | End: 2017-04-19
Attending: INTERNAL MEDICINE | Admitting: INTERNAL MEDICINE
Payer: COMMERCIAL

## 2017-04-19 VITALS
RESPIRATION RATE: 20 BRPM | TEMPERATURE: 98.1 F | DIASTOLIC BLOOD PRESSURE: 81 MMHG | SYSTOLIC BLOOD PRESSURE: 139 MMHG | HEART RATE: 88 BPM

## 2017-04-19 DIAGNOSIS — D61.818 PANCYTOPENIA (H): Primary | ICD-10-CM

## 2017-04-19 DIAGNOSIS — D61.3 IDIOPATHIC APLASTIC ANEMIA (H): ICD-10-CM

## 2017-04-19 LAB
ABO + RH BLD: NORMAL
ABO + RH BLD: NORMAL
ALBUMIN SERPL-MCNC: 3.3 G/DL (ref 3.4–5)
ALP SERPL-CCNC: 62 U/L (ref 40–150)
ALT SERPL W P-5'-P-CCNC: 22 U/L (ref 0–50)
ANION GAP SERPL CALCULATED.3IONS-SCNC: 10 MMOL/L (ref 3–14)
AST SERPL W P-5'-P-CCNC: 19 U/L (ref 0–45)
BASOPHILS # BLD AUTO: 0 10E9/L (ref 0–0.2)
BASOPHILS NFR BLD AUTO: 0 %
BILIRUB SERPL-MCNC: 2.1 MG/DL (ref 0.2–1.3)
BLD GP AB SCN SERPL QL: NORMAL
BLD PROD TYP BPU: NORMAL
BLD PROD TYP BPU: NORMAL
BLD UNIT ID BPU: 0
BLOOD BANK CMNT PATIENT-IMP: NORMAL
BLOOD PRODUCT CODE: NORMAL
BPU ID: NORMAL
BUN SERPL-MCNC: 41 MG/DL (ref 7–30)
CALCIUM SERPL-MCNC: 8.8 MG/DL (ref 8.5–10.1)
CHLORIDE SERPL-SCNC: 108 MMOL/L (ref 94–109)
CO2 SERPL-SCNC: 23 MMOL/L (ref 20–32)
CREAT SERPL-MCNC: 1.17 MG/DL (ref 0.52–1.04)
CYCLOSPORINE BLD LC/MS/MS-MCNC: 200 UG/L (ref 50–400)
DIFFERENTIAL METHOD BLD: ABNORMAL
DIFFERENTIAL METHOD BLD: NORMAL
EOSINOPHIL # BLD AUTO: 0 10E9/L (ref 0–0.7)
EOSINOPHIL NFR BLD AUTO: 0.6 %
ERYTHROCYTE [DISTWIDTH] IN BLOOD BY AUTOMATED COUNT: ABNORMAL % (ref 10–15)
ERYTHROCYTE [DISTWIDTH] IN BLOOD BY AUTOMATED COUNT: NORMAL % (ref 10–15)
GFR SERPL CREATININE-BSD FRML MDRD: 45 ML/MIN/1.7M2
GLUCOSE SERPL-MCNC: 113 MG/DL (ref 70–99)
HCT VFR BLD AUTO: 21.8 % (ref 35–47)
HCT VFR BLD AUTO: NORMAL % (ref 35–47)
HGB BLD-MCNC: 7.6 G/DL (ref 11.7–15.7)
HGB BLD-MCNC: NORMAL G/DL (ref 11.7–15.7)
IMM GRANULOCYTES # BLD: 0 10E9/L (ref 0–0.4)
IMM GRANULOCYTES NFR BLD: 0 %
LYMPHOCYTES # BLD AUTO: 1.9 10E9/L (ref 0.8–5.3)
LYMPHOCYTES NFR BLD AUTO: 53.8 %
MCH RBC QN AUTO: 36.5 PG (ref 26.5–33)
MCH RBC QN AUTO: NORMAL PG (ref 26.5–33)
MCHC RBC AUTO-ENTMCNC: 34.9 G/DL (ref 31.5–36.5)
MCHC RBC AUTO-ENTMCNC: NORMAL G/DL (ref 31.5–36.5)
MCV RBC AUTO: 105 FL (ref 78–100)
MCV RBC AUTO: NORMAL FL (ref 78–100)
MONOCYTES # BLD AUTO: 0.3 10E9/L (ref 0–1.3)
MONOCYTES NFR BLD AUTO: 8.5 %
NEUTROPHILS # BLD AUTO: 1.3 10E9/L (ref 1.6–8.3)
NEUTROPHILS NFR BLD AUTO: 37.1 %
NRBC # BLD AUTO: 0.1 10*3/UL
NRBC BLD AUTO-RTO: 3 /100
NUM BPU REQUESTED: 1
PLATELET # BLD AUTO: 65 10E9/L (ref 150–450)
PLATELET # BLD AUTO: NORMAL 10E9/L (ref 150–450)
POTASSIUM SERPL-SCNC: 4.7 MMOL/L (ref 3.4–5.3)
PROT SERPL-MCNC: 6.9 G/DL (ref 6.8–8.8)
RBC # BLD AUTO: 2.08 10E12/L (ref 3.8–5.2)
RBC # BLD AUTO: NORMAL 10E12/L (ref 3.8–5.2)
SODIUM SERPL-SCNC: 141 MMOL/L (ref 133–144)
SPECIMEN EXP DATE BLD: NORMAL
TME LAST DOSE: NORMAL H
TRANSFUSION STATUS PATIENT QL: NORMAL
TRANSFUSION STATUS PATIENT QL: NORMAL
WBC # BLD AUTO: 3.5 10E9/L (ref 4–11)
WBC # BLD AUTO: NORMAL 10E9/L (ref 4–11)

## 2017-04-19 PROCEDURE — 80158 DRUG ASSAY CYCLOSPORINE: CPT | Performed by: PHYSICIAN ASSISTANT

## 2017-04-19 PROCEDURE — P9040 RBC LEUKOREDUCED IRRADIATED: HCPCS | Performed by: INTERNAL MEDICINE

## 2017-04-19 PROCEDURE — 36430 TRANSFUSION BLD/BLD COMPNT: CPT

## 2017-04-19 PROCEDURE — 36593 DECLOT VASCULAR DEVICE: CPT

## 2017-04-19 PROCEDURE — 25000128 H RX IP 250 OP 636: Performed by: INTERNAL MEDICINE

## 2017-04-19 PROCEDURE — 86923 COMPATIBILITY TEST ELECTRIC: CPT | Performed by: INTERNAL MEDICINE

## 2017-04-19 PROCEDURE — 80053 COMPREHEN METABOLIC PANEL: CPT | Performed by: INTERNAL MEDICINE

## 2017-04-19 PROCEDURE — 86850 RBC ANTIBODY SCREEN: CPT | Performed by: INTERNAL MEDICINE

## 2017-04-19 PROCEDURE — 86900 BLOOD TYPING SEROLOGIC ABO: CPT | Performed by: INTERNAL MEDICINE

## 2017-04-19 PROCEDURE — 85025 COMPLETE CBC W/AUTO DIFF WBC: CPT | Performed by: INTERNAL MEDICINE

## 2017-04-19 PROCEDURE — 86901 BLOOD TYPING SEROLOGIC RH(D): CPT | Performed by: INTERNAL MEDICINE

## 2017-04-19 RX ADMIN — ALTEPLASE 2 MG: 2.2 INJECTION, POWDER, LYOPHILIZED, FOR SOLUTION INTRAVENOUS at 09:46

## 2017-04-19 RX ADMIN — ALTEPLASE 2 MG: 2.2 INJECTION, POWDER, LYOPHILIZED, FOR SOLUTION INTRAVENOUS at 09:47

## 2017-04-19 NOTE — PROGRESS NOTES
Infusion Nursing Note:  Bonny Raymundo presents today for RBC transfusion.    Patient seen by provider today: No   present during visit today: Not Applicable.    Note: Both lines of PICC are sluggish. Cath guerita ordered.    Intravenous Access:  PICC.    Treatment Conditions:  Lab Results   Component Value Date    HGB 7.6 04/19/2017     Lab Results   Component Value Date    WBC 3.5 04/19/2017      Lab Results   Component Value Date    ANEU 1.3 04/19/2017     Lab Results   Component Value Date    PLT 65 04/19/2017      Lab Results   Component Value Date     04/12/2017                   Lab Results   Component Value Date    POTASSIUM 3.8 04/12/2017           Lab Results   Component Value Date    MAG 1.4 01/25/2017            Lab Results   Component Value Date    CR 1.25 04/12/2017                   Lab Results   Component Value Date    LEO 8.7 04/12/2017                Lab Results   Component Value Date    BILITOTAL 2.2 04/12/2017           Lab Results   Component Value Date    ALBUMIN 3.3 04/12/2017                    Lab Results   Component Value Date    ALT 22 04/12/2017           Lab Results   Component Value Date    AST 14 04/12/2017     Results reviewed, labs MET treatment parameters, ok to proceed with treatment.  Blood transfusion consent signed 10/6/16.      Post Infusion Assessment:  Patient tolerated infusion without incident.  Site patent and intact, free from redness, edema or discomfort.  No evidence of extravasations.    Discharge Plan:   Patient and/or family verbalized understanding of discharge instructions and all questions answered.  Copy of AVS reviewed with patient and/or family.  Patient will return Sunday for next appointment.  Patient discharged in stable condition accompanied by: self.  Departure Mode: Ambulatory.    Laisha Jeffery RN

## 2017-04-19 NOTE — MR AVS SNAPSHOT
After Visit Summary   4/19/2017    Bonny Raymundo    MRN: [id number]           Patient Information     Date Of Birth          1945        Visit Information        Provider Department      4/19/2017 8:30 AM SH INFUSION CHAIR 17 [facility]        Today's Diagnoses     Pancytopenia (H)    -  1    Idiopathic aplastic anemia (H)           Follow-ups after your visit        Your next 10 appointments already scheduled     Apr 21, 2017 10:20 AM CDT   (Arrive by 10:05 AM)   Return Visit with Celine Walls PA-C   [facility] ([facility] [place])    [address]  [address]nd [address]  [facility] [id number] [phone]            Apr 23, 2017  8:30 AM CDT   Level 5 with SH INFUSION CHAIR 13   [facility] ([facility])    [facility] [place] [place]  [address]  [place] [address]   [phone]            Apr 26, 2017  8:30 AM CDT   Level 5 with SH INFUSION CHAIR 17   [facility] ([facility])    [facility] [place]  [address]  [place] [address]   [phone]            Apr 30, 2017  8:30 AM CDT   Level 5 with SH INFUSION CHAIR 15   [facility] ([facility])    [facility] [place]  [address]  [place] [address]   [phone]            May 03, 2017  8:30 AM CDT   Level 5 with SH INFUSION CHAIR 3   [facility] ([facility])    [facility] [place]  [address]  [place] [address]   [phone]            May 04, 2017  4:00 PM CDT   RETURN ONC with Rafita Rogel MD   [facility] ([facility])    [address]  [address]  [facility] [id number] [phone]             "  Future tests that were ordered for you today     Open Standing Orders        Priority Remaining Interval Expires Ordered    Red blood cell prepare order unit Routine 99/100 CONDITIONAL (SPECIFY) BLOOD  2017    Transfuse red blood cell unit Routine 99/100 TRANSFUSE 1 UNIT  2017            Who to contact     If you have questions or need follow up information about today's clinic visit or your schedule please contact [facility] INF[facility]ION [facility] directly at [phone].  Normal or non-critical lab and imaging results will be communicated to you by Business Monitor Internationalhart, letter or phone within 4 business days after the clinic has received the results. If you do not hear from us within 7 days, please contact the clinic through "[employer]t or phone. If you have a critical or abnormal lab result, we will notify you by phone as soon as possible.  Submit refill requests through FanBread or call your pharmacy and they will forward the refill request to us. Please allow 3 business days for your refill to be completed.          Additional Information About Your Visit        FanBread Information     FanBread lets you send messages to your doctor, view your test results, renew your prescriptions, schedule appointments and more. To sign up, go to [email or web address] . Click on \"Log in\" on the left side of the screen, which will take you to the Welcome page. Then click on \"Sign up Now\" on the right side of the page.     You will be asked to enter the access code listed below, as well as some personal information. Please follow the directions to create your username and password.     Your access code is: [email or web address]  Expires: 2017  1:59 PM     Your access code will  in 90 days. If you need help or a new code, please call your [facility] clinic or [phone].        Care EveryWhere ID     This is your Care EveryWhere ID. This could be used by other organizations to access your [facility] medical " records  [id number]        Your Vitals Were     Pulse Temperature Respirations             88 98.1  F (36.7  C) (Oral) 20          Blood Pressure from Last 3 Encounters:   04/19/17 139/81   04/16/17 136/83   04/12/17 133/71    Weight from Last 3 Encounters:   04/04/17 59.5 kg (131 lb 1.6 oz)   03/09/17 59.3 kg (130 lb 11.7 oz)   03/03/17 59.7 kg (131 lb 11.2 oz)              We Performed the Following     ABO/Rh type and screen     Blood component     CBC with platelets differential     CBC with platelets differential     Comprehensive metabolic panel     Cyclosporine     MD Instruction for Therapy Plan     Obtain affirmation of informed consent     Red blood cell prepare order unit     Transfuse red blood cell unit     Treatment Conditions        Primary Care Provider Office Phone # Fax #    Rafita Spencer Rogel [phone]        PHYSICIANS [address]  [facility] [address]        Thank you!     Thank you for choosing [facility]  for your care. Our goal is always to provide you with excellent care. Hearing back from our patients is one way we can continue to improve our services. Please take a few minutes to complete the written survey that you may receive in the mail after your visit with us. Thank you!             Your Updated Medication List - Protect others around you: Learn how to safely use, store and throw away your medicines at www.disposemymeds.org.          This list is accurate as of: 4/19/17  1:39 PM.  Always use your most recent med list.                   Brand Name Dispense Instructions for use    BENADRYL ALLERGY 25 MG tablet   Generic drug:  diphenhydrAMINE     56 tablet    Take 1 tablet (25 mg) by mouth nightly as needed       chlorpheniramine 4 MG tablet    CHLOR-TRIMETON     Take 4 mg by mouth every 6 hours as needed. For head cold       COMPRESSION STOCKINGS     2 each    Wear compression stockings at 20-30 mmHg rating most  time during the day to the affected leg (left leg) or both legs. Take them off at night.       cycloSPORINE modified 25 MG capsule    GENERIC EQUIVALENT    300 capsule    Take 75 mg by mouth 2 times daily       diclofenac 1 % Gel topical gel    VOLTAREN    100 g    Apply 2 g topically 2 times daily       eltrombopag 50 MG tablet    PROMACTA    90 tablet    Take 3 tablets (150 mg) by mouth daily Administer on an empty stomach, 1 hour before or 2 hours after a meal. Or as directed       * fluconazole 200 MG tablet    DIFLUCAN    30 tablet    Take 1 tablet (200 mg) by mouth daily       * fluconazole 200 MG tablet    DIFLUCAN    30 tablet    TAKE ONE TABLET BY MOUTH EVERY DAY       LEVAQUIN 250 MG tablet   Generic drug:  levofloxacin     30 tablet    Take 1 tablet (250 mg) by mouth daily       loratadine 10 MG tablet    CLARITIN     Take 10 mg by mouth daily as needed. For head cold       nystatin [id number] UNIT/ML suspension    MYCOSTATIN    280 mL    Take 5 mLs (500,000 Units) by mouth 4 times daily Swish and spit.       order for DME     1 Box    Equipment being ordered: knee high compression stockings- 18-20 mm       oxyCODONE 5 MG IR tablet    ROXICODONE    50 tablet    Take 1 tablet (5 mg) by mouth every 6 hours as needed for moderate to severe pain       pantoprazole 40 MG EC tablet    PROTONIX    30 tablet    Take 1 tablet (40 mg) by mouth every morning       polyethylene glycol Packet    MIRALAX/GLYCOLAX    14 packet    Take 17 g by mouth daily . If you start to have loose stools/diarrhea or multiple bowel movements, ok to hold this medication. Then resume if no bowel movement after 24-48hours.       predniSONE 10 MG tablet    DELTASONE    30 tablet    Take 1 tablet (10 mg) by mouth daily for 30 doses       SENNA S 8.6-50 MG per tablet   Generic drug:  senna-docusate     100 tablet    Can take 1-2 tablets up to twice a day. Can start with 1-2 tablets daily. If no bowel movement within 24 hours, can take 1-2  tablets twice a day. HOLD if you develop diarrhea. Resume taking if no bowel movement in 24 hours.       TYLENOL PO      Take 325 mg by mouth every 6 hours as needed for mild pain or fever       * Notice:  This list has 2 medication(s) that are the same as other medications prescribed for you. Read the directions carefully, and ask your doctor or other care provider to review them with you.

## 2017-04-23 ENCOUNTER — INFUSION THERAPY VISIT (OUTPATIENT)
Dept: INFUSION THERAPY | Facility: CLINIC | Age: 72
End: 2017-04-23
Attending: INTERNAL MEDICINE
Payer: COMMERCIAL

## 2017-04-23 ENCOUNTER — HOSPITAL ENCOUNTER (OUTPATIENT)
Facility: CLINIC | Age: 72
Setting detail: SPECIMEN
Discharge: HOME | End: 2017-04-23
Attending: PHYSICIAN ASSISTANT | Admitting: PHYSICIAN ASSISTANT
Payer: COMMERCIAL

## 2017-04-23 VITALS
HEART RATE: 82 BPM | RESPIRATION RATE: 14 BRPM | TEMPERATURE: 98.1 F | DIASTOLIC BLOOD PRESSURE: 76 MMHG | SYSTOLIC BLOOD PRESSURE: 126 MMHG

## 2017-04-23 DIAGNOSIS — D61.3 IDIOPATHIC APLASTIC ANEMIA (H): Primary | ICD-10-CM

## 2017-04-23 DIAGNOSIS — D61.818 PANCYTOPENIA (H): ICD-10-CM

## 2017-04-23 LAB
BASOPHILS # BLD AUTO: 0 10E9/L (ref 0–0.2)
BASOPHILS NFR BLD AUTO: 0 %
BLD PROD TYP BPU: NORMAL
BLD PROD TYP BPU: NORMAL
BLD UNIT ID BPU: 0
BLOOD PRODUCT CODE: NORMAL
BPU ID: NORMAL
DIFFERENTIAL METHOD BLD: ABNORMAL
EOSINOPHIL # BLD AUTO: 0.1 10E9/L (ref 0–0.7)
EOSINOPHIL NFR BLD AUTO: 3 %
ERYTHROCYTE [DISTWIDTH] IN BLOOD BY AUTOMATED COUNT: ABNORMAL % (ref 10–15)
HCT VFR BLD AUTO: 28.3 % (ref 35–47)
HGB BLD-MCNC: 9.8 G/DL (ref 11.7–15.7)
LYMPHOCYTES # BLD AUTO: 2.7 10E9/L (ref 0.8–5.3)
LYMPHOCYTES NFR BLD AUTO: 57 %
MCH RBC QN AUTO: 36 PG (ref 26.5–33)
MCHC RBC AUTO-ENTMCNC: 34.6 G/DL (ref 31.5–36.5)
MCV RBC AUTO: 104 FL (ref 78–100)
MONOCYTES # BLD AUTO: 0.2 10E9/L (ref 0–1.3)
MONOCYTES NFR BLD AUTO: 4 %
NEUTROPHILS # BLD AUTO: 1.7 10E9/L (ref 1.6–8.3)
NEUTROPHILS NFR BLD AUTO: 36 %
NRBC # BLD AUTO: 0.2 10*3/UL
NRBC BLD AUTO-RTO: 5 /100
NUM BPU REQUESTED: 1
PLATELET # BLD AUTO: 24 10E9/L (ref 150–450)
RBC # BLD AUTO: 2.72 10E12/L (ref 3.8–5.2)
TRANSFUSION STATUS PATIENT QL: NORMAL
TRANSFUSION STATUS PATIENT QL: NORMAL
WBC # BLD AUTO: 4.7 10E9/L (ref 4–11)

## 2017-04-23 PROCEDURE — 36430 TRANSFUSION BLD/BLD COMPNT: CPT

## 2017-04-23 PROCEDURE — 85025 COMPLETE CBC W/AUTO DIFF WBC: CPT | Performed by: PHYSICIAN ASSISTANT

## 2017-04-23 PROCEDURE — P9037 PLATE PHERES LEUKOREDU IRRAD: HCPCS | Performed by: PHYSICIAN ASSISTANT

## 2017-04-23 ASSESSMENT — PAIN SCALES - GENERAL: PAINLEVEL: MODERATE PAIN (4)

## 2017-04-23 NOTE — MR AVS SNAPSHOT
After Visit Summary   4/23/2017    Bonny Raymundo    MRN: [id number]           Patient Information     Date Of Birth          1945        Visit Information        Provider Department      4/23/2017 8:30 AM  INFUSION CHAIR 13 [facility]        Today's Diagnoses     Idiopathic aplastic anemia (H)    -  1    Pancytopenia (H)           Follow-ups after your visit        Follow-up notes from your care team     Return in 3 days (on 4/26/2017).      Your next 10 appointments already scheduled     Apr 24, 2017  2:20 PM CDT   (Arrive by 2:05 PM)   Return Visit with Celine Walls PA-C   [facility] ([facility])    [address]  2nd Floor  [facility] [address] [phone]            Apr 26, 2017  8:30 AM CDT   Level 5 with  INFUSION CHAIR 17   [facility] ([facility])    [facility] [employer]  [address]  [facility] [address]   [phone]            Apr 30, 2017  8:30 AM CDT   Level 5 with SH INFUSION CHAIR 15   [facility] ([facility])    [facility] [employer]  [address]  [facility] [address]   [phone]            May 03, 2017  8:30 AM CDT   Level 5 with  INFUSION CHAIR 3   [facility] ([facility])    [facility] [employer]  [address]  [facility] [address]   [phone]            May 04, 2017  4:00 PM CDT   RETURN ONC with Rafita Rogel MD   [facility] ([facility])    [address]  2nd Floor  [facility] [address] [phone]              Future tests that were ordered for you today     Open Standing Orders        Priority Remaining Interval Expires Ordered    Platelets prepare order unit Routine 99/100  "CONDITIONAL (SPECIFY) BLOOD  2017    Transfuse platelets unit Routine 99/100 TRANSFUSE 1 DOSE  2017            Who to contact     If you have questions or need follow up information about today's clinic visit or your schedule please contact [facility] directly at [phone].  Normal or non-critical lab and imaging results will be communicated to you by MyChart, letter or phone within 4 business days after the clinic has received the results. If you do not hear from us within 7 days, please contact the clinic through [employer]an[employer] or phone. If you have a critical or abnormal lab result, we will notify you by phone as soon as possible.  Submit refill requests through [employer] or call your pharmacy and they will forward the refill request to us. Please allow 3 business days for your refill to be completed.          Additional Information About Your Visit        MyChart Information     [employer] lets you send messages to your doctor, view your test results, renew your prescriptions, schedule appointments and more. To sign up, go to [email or web address] . Click on \"Log in\" on the left side of the screen, which will take you to the Welcome page. Then click on \"Sign up Now\" on the right side of the page.     You will be asked to enter the access code listed below, as well as some personal information. Please follow the directions to create your username and password.     Your access code is: [email or web address]  Expires: 2017  1:59 PM     Your access code will  in 90 days. If you need help or a new code, please call your [place] clinic or [phone].        Care EveryWhere ID     This is your Care EveryWhere ID. This could be used by other organizations to access your [place] medical records  [id number]        Your Vitals Were     Pulse Temperature Respirations             82 98.1  F (36.7  C) (Oral) 14          Blood Pressure from Last 3 Encounters:   17 126/76 "   04/19/17 139/81   04/16/17 136/83    Weight from Last 3 Encounters:   04/04/17 59.5 kg (131 lb 1.6 oz)   03/09/17 59.3 kg (130 lb 11.7 oz)   03/03/17 59.7 kg (131 lb 11.2 oz)              We Performed the Following     Blood component     CBC with platelets differential     Obtain affirmation of informed consent     Platelets prepare order unit     Transfuse platelets unit        Primary Care Provider Office Phone # Fax #    Rafita Spencer Rogel [phone]        [facility] [address]  [facility] [address]        Thank you!     Thank you for choosing [facility]  for your care. Our goal is always to provide you with excellent care. Hearing back from our patients is one way we can continue to improve our services. Please take a few minutes to complete the written survey that you may receive in the mail after your visit with us. Thank you!             Your Updated Medication List - Protect others around you: Learn how to safely use, store and throw away your medicines at www.disposemymeds.org.          This list is accurate as of: 4/23/17  1:34 PM.  Always use your most recent med list.                   Brand Name Dispense Instructions for use    BENADRYL ALLERGY 25 MG tablet   Generic drug:  diphenhydrAMINE     56 tablet    Take 1 tablet (25 mg) by mouth nightly as needed       chlorpheniramine 4 MG tablet    CHLOR-TRIMETON     Take 4 mg by mouth every 6 hours as needed. For head cold       COMPRESSION STOCKINGS     2 each    Wear compression stockings at 20-30 mmHg rating most time during the day to the affected leg (left leg) or both legs. Take them off at night.       cycloSPORINE modified 25 MG capsule    GENERIC EQUIVALENT    300 capsule    Take 75 mg by mouth 2 times daily       diclofenac 1 % Gel topical gel    VOLTAREN    100 g    Apply 2 g topically 2 times daily       eltrombopag 50 MG tablet    PROMACTA    90 tablet    Take 3 tablets  (150 mg) by mouth daily Administer on an empty stomach, 1 hour before or 2 hours after a meal. Or as directed       * fluconazole 200 MG tablet    DIFLUCAN    30 tablet    Take 1 tablet (200 mg) by mouth daily       * fluconazole 200 MG tablet    DIFLUCAN    30 tablet    TAKE ONE TABLET BY MOUTH EVERY DAY       LEVAQUIN 250 MG tablet   Generic drug:  levofloxacin     30 tablet    Take 1 tablet (250 mg) by mouth daily       loratadine 10 MG tablet    CLARITIN     Take 10 mg by mouth daily as needed. For head cold       nystatin 596006 UNIT/ML suspension    MYCOSTATIN    280 mL    Take 5 mLs (500,000 Units) by mouth 4 times daily Swish and spit.       order for DME     1 Box    Equipment being ordered: knee high compression stockings- 18-20 mm       oxyCODONE 5 MG IR tablet    ROXICODONE    50 tablet    Take 1 tablet (5 mg) by mouth every 6 hours as needed for moderate to severe pain       pantoprazole 40 MG EC tablet    PROTONIX    30 tablet    Take 1 tablet (40 mg) by mouth every morning       polyethylene glycol Packet    MIRALAX/GLYCOLAX    14 packet    Take 17 g by mouth daily . If you start to have loose stools/diarrhea or multiple bowel movements, ok to hold this medication. Then resume if no bowel movement after 24-48hours.       predniSONE 10 MG tablet    DELTASONE    30 tablet    Take 1 tablet (10 mg) by mouth daily for 30 doses       SENNA S 8.6-50 MG per tablet   Generic drug:  senna-docusate     100 tablet    Can take 1-2 tablets up to twice a day. Can start with 1-2 tablets daily. If no bowel movement within 24 hours, can take 1-2 tablets twice a day. HOLD if you develop diarrhea. Resume taking if no bowel movement in 24 hours.       TYLENOL PO      Take 325 mg by mouth every 6 hours as needed for mild pain or fever       * Notice:  This list has 2 medication(s) that are the same as other medications prescribed for you. Read the directions carefully, and ask your doctor or other care provider to review  them with you.

## 2017-04-23 NOTE — PROGRESS NOTES
Infusion Nursing Note:  Bonny Raymundo presents today for labs and possible transfusion.    Patient seen by provider today: No   present during visit today: Not Applicable.    Note: N/A.    Intravenous Access:  Labs drawn without difficulty.  Implanted Port.    Treatment Conditions:  Lab Results   Component Value Date    HGB 9.8 04/23/2017     Lab Results   Component Value Date    WBC 4.7 04/23/2017      Lab Results   Component Value Date    ANEU 1.7 04/23/2017     Lab Results   Component Value Date    PLT 24 04/23/2017      Results reviewed, labs MET treatment parameters, ok to proceed with treatment.  Blood transfusion consent signed 10-6-2016.      Post Infusion Assessment:  Patient tolerated infusion without incident.  Blood return noted pre and post infusion.  Site patent and intact, free from redness, edema or discomfort.  No evidence of extravasations.    Discharge Plan:   Discharge instructions reviewed with: Patient.  Patient and/or family verbalized understanding of discharge instructions and all questions answered.  Copy of AVS reviewed with patient and/or family.  Patient will return 4- for next appointment.  Patient discharged in stable condition accompanied by: self.  Departure Mode: Ambulatory.    Evelyn Bruner RN

## 2017-04-24 ENCOUNTER — ONCOLOGY VISIT (OUTPATIENT)
Dept: ONCOLOGY | Facility: CLINIC | Age: 72
End: 2017-04-24
Attending: PHYSICIAN ASSISTANT
Payer: COMMERCIAL

## 2017-04-24 VITALS
TEMPERATURE: 98.4 F | WEIGHT: 129.3 LBS | BODY MASS INDEX: 22.91 KG/M2 | SYSTOLIC BLOOD PRESSURE: 138 MMHG | DIASTOLIC BLOOD PRESSURE: 82 MMHG | RESPIRATION RATE: 18 BRPM | HEART RATE: 95 BPM | OXYGEN SATURATION: 93 % | HEIGHT: 63 IN

## 2017-04-24 DIAGNOSIS — R11.2 NAUSEA AND VOMITING, INTRACTABILITY OF VOMITING NOT SPECIFIED, UNSPECIFIED VOMITING TYPE: ICD-10-CM

## 2017-04-24 DIAGNOSIS — R13.19 OTHER DYSPHAGIA: Primary | ICD-10-CM

## 2017-04-24 PROCEDURE — 99214 OFFICE O/P EST MOD 30 MIN: CPT | Mod: ZP | Performed by: PHYSICIAN ASSISTANT

## 2017-04-24 PROCEDURE — 99212 OFFICE O/P EST SF 10 MIN: CPT | Mod: ZF

## 2017-04-24 RX ORDER — ONDANSETRON 4 MG/1
4 TABLET, ORALLY DISINTEGRATING ORAL EVERY 6 HOURS PRN
Qty: 20 TABLET | Refills: 1 | Status: SHIPPED | OUTPATIENT
Start: 2017-04-24 | End: 2017-07-13

## 2017-04-24 ASSESSMENT — PAIN SCALES - GENERAL: PAINLEVEL: MILD PAIN (3)

## 2017-04-24 NOTE — NURSING NOTE
"Bonny Raymundo is a 71 year old female who presents for:  Chief Complaint   Patient presents with     Oncology Clinic Visit     Aplastic Anemia        Initial Vitals:  /82 (BP Location: Right arm, Patient Position: Chair, Cuff Size: Adult Regular)  Pulse 95  Temp 98.4  F (36.9  C) (Oral)  Resp 18  Ht 1.6 m (5' 2.99\")  Wt 58.7 kg (129 lb 4.8 oz)  SpO2 93%  BMI 22.91 kg/m2 Estimated body mass index is 22.91 kg/(m^2) as calculated from the following:    Height as of this encounter: 1.6 m (5' 2.99\").    Weight as of this encounter: 58.7 kg (129 lb 4.8 oz).. Body surface area is 1.62 meters squared. BP completed using cuff size: regular  Mild Pain (3) No LMP recorded. Patient has had a hysterectomy. Allergies and medications reviewed.     Medications: MEDICATION REFILLS NEEDED TODAY.  Pharmacy name entered into [facility]:    [facility] [place], [place] - [address]  [facility] [place], [place] - [address]  [facility] [place], [place] - [address] [facility]  WRITTEN PRESCRIPTION REQUESTED    Comments: Pt declined to review medications, stating they are all UTD.    7 minutes for nursing intake (face to face time)   Clarice Munoz LPN        "

## 2017-04-24 NOTE — PATIENT INSTRUCTIONS
1. Continue cyclosporine 75 mg BID  2. Continue Promacta 150 mg daily  3. Continue prednisone taper  4. OK to stop Levaquin and Fluconazole  5. Take pantoprazole twice a day ( AM and PM ) on empty stomach (this is the heartburn medication)  6. Take oral dissolvable Zofran as needed for nausea

## 2017-04-24 NOTE — MR AVS SNAPSHOT
After Visit Summary   4/24/2017    Bonny Raymundo    MRN: [id number]           Patient Information     Date Of Birth          1945        Visit Information        Provider Department      4/24/2017 2:20 PM Celine Walls PA-C [facility]        Today's Diagnoses     Other dysphagia    -  1    Nausea and vomiting, intractability of vomiting not specified, unspecified vomiting type          Care Instructions    1. Continue cyclosporine 75 mg BID  2. Continue Promacta 150 mg daily  3. Continue prednisone taper  4. OK to stop Levaquin and Fluconazole  5. Take pantoprazole twice a day ( AM and PM ) on empty stomach (this is the heartburn medication)  6. Take oral dissolvable Zofran as needed for nausea        Follow-ups after your visit        Additional Services     Speech Therapy Referral       *This order will print in the [facility] Scheduling Office*    [facility] provides Speech Therapy evaluation and treatment and many specialty services across the [place] system.  If requesting a specialty program, please choose from the list below.    Call [phone] to schedule [facility] at all locations, with the exception of [facility], please call [phone].     Treatment: Evaluation & Treatment  Speech Treatment Diagnosis: Dysphagia  Special Instructions: gagging and emesis - new onset last 2 weeks  Special Programs: Clinical Swallow Study    Please be aware that coverage of these services is subject to the terms and limitations of your health insurance plan.  Call member services at your health plan with any benefit or coverage questions.                  Your next 10 appointments already scheduled     Apr 26, 2017  8:30 AM CDT   Level 5 with  INFUSION CHAIR [address] [place] [address] [facility] ([facility])    [facility] [place]  [address]  [place] [address]   [phone]            Apr 30, 2017  8:30 AM CDT   Level 5 with SH INFUSION CHAIR 15   [facility] ([facility])    [facility] [place]  [address]  [place] [address]   [phone]            May 03, 2017  8:30 AM CDT   Level 5 with SH INFUSION CHAIR 3   [facility] ([facility])    [facility] [place]  [address]  [place] [address]   [phone]            May 04, 2017  4:00 PM CDT   RETURN ONC with Rafita Rogel MD   [facility] ([facility])    [address] [place] [address]   [phone]            May 10, 2017  8:30 AM CDT   Level 5 with SH INFUSION CHAIR 13   [facility] ([facility])    [facility] [place]  [address]  [place] [address]   [phone]            May 17, 2017  9:00 AM CDT   Level 5 with SH INFUSION CHAIR 18   [facility] ([facility])    [facility] [place]  [address]  [place] [address]   [phone]            May 24, 2017  8:30 AM CDT   Level 5 with SH INFUSION CHAIR 3   [facility] ([facility])    [facility] [place]  [address]  [place] [address]   [phone]            May 31, 2017  8:00 AM CDT   Level 5 with SH INFUSION CHAIR 14   [facility] ([facility])    [facility] [place]  [address]  [place] [address]   [phone]            Jun 07, 2017  8:30 AM CDT   Level 5 with SH INFUSION CHAIR 18   [facility] ([facility]  [address]  [address]  [place] [address] [phone]              Future tests that were ordered for you today     Open Future Orders        Priority Expected Expires Ordered    XR Video Swallow w Esophagram Routine 2017            Who to contact     If you have questions or need follow up information about today's clinic visit or your schedule please contact [facility] directly at [phone].  Normal or non-critical lab and imaging results will be communicated to you by [employer], letter or phone within 4 business days after the clinic has received the results. If you do not hear from us within 7 days, please contact the clinic through [employer] or phone. If you have a critical or abnormal lab result, we will notify you by phone as soon as possible.  Submit refill requests through [employer] or call your pharmacy and they will forward the refill request to us. Please allow 3 business days for your refill to be completed.          Additional Information About Your Visit        Cor[employer]ia Information     [employer] lets you send messages to your doctor, view your test results, renew your prescriptions, schedule appointments and more. To sign up, go to [email or web address] . Click on \"Log in\" on the left side of the screen, which will take you to the Welcome page. Then click on \"Sign up Now\" on the right side of the page.     You will be asked to enter the access code listed below, as well as some personal information. Please follow the directions to create your username and password.     Your access code is: [email or web address]  Expires: 2017  1:59 PM     Your access code will  in 90 days. If you need help or a new code, please call your [place] clinic or [phone].        Care EveryWhere ID     This is your Care EveryWhere ID. This could be used by other organizations to access your [place] medical records  [id number]        Your Vitals Were     Pulse Temperature " "Respirations Height Pulse Oximetry BMI (Body Mass Index)    95 98.4  F (36.9  C) (Oral) 18 1.6 m (5' 2.99\") 93% 22.91 kg/m2       Blood Pressure from Last 3 Encounters:   04/24/17 138/82   04/23/17 126/76   04/19/17 139/81    Weight from Last 3 Encounters:   04/24/17 58.7 kg (129 lb 4.8 oz)   04/04/17 59.5 kg (131 lb 1.6 oz)   03/09/17 59.3 kg (130 lb 11.7 oz)              We Performed the Following     Speech Therapy Referral          Today's Medication Changes          These changes are accurate as of: 4/24/17 11:59 PM.  If you have any questions, ask your nurse or doctor.               Start taking these medicines.        Dose/Directions    ondansetron 4 MG ODT tab   Commonly known as:  ZOFRAN-ODT   Used for:  Nausea and vomiting, intractability of vomiting not specified, unspecified vomiting type   Started by:  Celine Walls PA-C        Dose:  4 mg   Take 1 tablet (4 mg) by mouth every 6 hours as needed for nausea   Quantity:  20 tablet   Refills:  1            Where to get your medicines      These medications were sent to [place] [id number] [place] [id number]    Hours:  TRANSPLANT PHONE NUMBER [phone] Phone:  [phone]     ondansetron 4 MG ODT tab                Primary Care Provider Office Phone # Fax #    Rafita Rogel [phone]        [facility] [address] [place] [address]        Thank you!     Thank you for choosing [facility]  for your care. Our goal is always to provide you with excellent care. Hearing back from our patients is one way we can continue to improve our services. Please take a few minutes to complete the written survey that you may receive in the mail after your visit with us. Thank you!             Your Updated Medication List - Protect others around you: Learn how to safely use, store and throw away your medicines " at www.disposemymeds.org.          This list is accurate as of: 4/24/17 11:59 PM.  Always use your most recent med list.                   Brand Name Dispense Instructions for use    BENADRYL ALLERGY 25 MG tablet   Generic drug:  diphenhydrAMINE     56 tablet    Take 1 tablet (25 mg) by mouth nightly as needed       chlorpheniramine 4 MG tablet    CHLOR-TRIMETON     Take 4 mg by mouth every 6 hours as needed. For head cold       COMPRESSION STOCKINGS     2 each    Wear compression stockings at 20-30 mmHg rating most time during the day to the affected leg (left leg) or both legs. Take them off at night.       cycloSPORINE modified 25 MG capsule    GENERIC EQUIVALENT    300 capsule    Take 75 mg by mouth 2 times daily       diclofenac 1 % Gel topical gel    VOLTAREN    100 g    Apply 2 g topically 2 times daily       eltrombopag 50 MG tablet    PROMACTA    90 tablet    Take 3 tablets (150 mg) by mouth daily Administer on an empty stomach, 1 hour before or 2 hours after a meal. Or as directed       fluconazole 200 MG tablet    DIFLUCAN    30 tablet    Take 1 tablet (200 mg) by mouth daily       loratadine 10 MG tablet    CLARITIN     Take 10 mg by mouth daily as needed. For head cold       nystatin 524635 UNIT/ML suspension    MYCOSTATIN    280 mL    Take 5 mLs (500,000 Units) by mouth 4 times daily Swish and spit.       ondansetron 4 MG ODT tab    ZOFRAN-ODT    20 tablet    Take 1 tablet (4 mg) by mouth every 6 hours as needed for nausea       order for DME     1 Box    Equipment being ordered: knee high compression stockings- 18-20 mm       oxyCODONE 5 MG IR tablet    ROXICODONE    50 tablet    Take 1 tablet (5 mg) by mouth every 6 hours as needed for moderate to severe pain       pantoprazole 40 MG EC tablet    PROTONIX    30 tablet    Take 1 tablet (40 mg) by mouth every morning       polyethylene glycol Packet    MIRALAX/GLYCOLAX    14 packet    Take 17 g by mouth daily . If you start to have loose  stools/diarrhea or multiple bowel movements, ok to hold this medication. Then resume if no bowel movement after 24-48hours.       SENNA S 8.6-50 MG per tablet   Generic drug:  senna-docusate     100 tablet    Can take 1-2 tablets up to twice a day. Can start with 1-2 tablets daily. If no bowel movement within 24 hours, can take 1-2 tablets twice a day. HOLD if you develop diarrhea. Resume taking if no bowel movement in 24 hours.       TYLENOL PO      Take 325 mg by mouth every 6 hours as needed for mild pain or fever

## 2017-04-25 DIAGNOSIS — R13.10 DYSPHAGIA, UNSPECIFIED TYPE: Primary | ICD-10-CM

## 2017-04-25 NOTE — PROGRESS NOTES
[facility] CANCER CLINIC  FOLLOW-UP VISIT NOTE  Date of visit:   4/24/17        REASON FOR VISIT: Aplastic anemia, routine 2 week follow up    HPI: Bonny is a 71 year old female who presented in the fall of 2016 with fatigue and shortness of breath. 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  Her counts showed pancytopenia and BMBX was concerning for aplastic anemia.  By December of 2016 she was transfusion dependent with platelets under 10 k and ANC dropped under 500. 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  She has met with Dr Mcdaniel at [facility] and consulted with Dr Rogel at [facility].      After further consultation it was decided to admit on 1/9/17 for ATG/cyclosporine/prednisone therapy.     The following was her inpatient regimen:  Day 1= 1/9/17  ATG 2500 mg (40 mg/kg0 q24 hours D-4)  Cyclosporine 200 mg (3 mg/kg) PO bid  Eltrombopag 50 mg daily  Methylpred 31 mg (0.5 mg/kg) q24 hours D1-4  Prednisone 60 mg (1 mg/kg) daily after completion of IV methylpred to continue for at least 2 weeks    Bonny also had a admission 1/23-1/25/17 for rectal bleeding (presumed hemorrhoidal).  See DC summary for details.     INTERVAL HISTORY: Bonny is here today for her q2w follow up. Bonny feels that she is feeling the same as she has been. Her stamina and OCASIO is fairly stable.  Specifically, she is completely independent with all her ADLs and can walk her dog 1-2 times a week - although its ten minutes max.  Standing/walking for more than 10-15 minutes wears her out and she needs to sit/rest.   She continues to eat good for meals and family/friends continue to bring her food.  She went out to dinner with friends last Friday night and stayed out until 10 pm and felt well.  Her low back pain waxes and wanes.  Some days it limits her activity and other days it does not. Bonny is using tylenol or 1 oxycodone daily.     New in the last ten days is  "some nausea, which she describes as a \"queezy\" feeling in her stomach but also as a \"gagging\" and feeling of fullness in the back of her throat.  She has thrown up three times in the last 2 weeks, twice its been her pills. She has been eating more soft foods hoping this would go down better.    No fevers, chest pain and and no bleeding. Daily BM, no black/blood. No  issues. She has a tremor.    ROS: complete review of systems was performed which was negative unless noted above.    EXAM:  /82 (BP Location: Right arm, Patient Position: Chair, Cuff Size: Adult Regular)  Pulse 95  Temp 98.4  F (36.9  C) (Oral)  Resp 18  Ht 1.6 m (5' 2.99\")  Wt 58.7 kg (129 lb 4.8 oz)  SpO2 93%  BMI 22.91 kg/m2  Wt Readings from Last 4 Encounters:   04/24/17 58.7 kg (129 lb 4.8 oz)   04/04/17 59.5 kg (131 lb 1.6 oz)   03/09/17 59.3 kg (130 lb 11.7 oz)   03/03/17 59.7 kg (131 lb 11.2 oz)     General: Patient alert and oriented x3.    EYES: PERRLA, conjunctiva pink, sclera is jaundice.   Neck: No palpable cervical, supraclavicular or axillary nodes bilaterally.   Cardiovascular: RRR, no murmurs, gallops or rubs  Respiratory: clear to auscultation bilaterally;  no crackles, rhonchi or wheezing.   Abdomen: positive bowel sounds in all four quadrants, soft, nontender  Skin: light jaundice, intact  Neuro: grossly intact.   Mood and affect is stable.       LABS: no labs today- we reviewed recent labs done at [facility] on 4/23 4/12/2017 08:45 4/16/2017 07:50 4/19/2017 10:00 4/23/2017 08:37   WBC 3.7 (L) 3.8 (L) 3.5 (L) 4.7   Hemoglobin 8.5 (L) 8.3 (L) 7.6 (L) 9.8 (L)   Hematocrit 24.8 (L) 24.1 (L) 21.8 (L) 28.3 (L)   Platelet Count 34 (LL) 18 (LL) 65 (L) 24 (LL)   RBC Count 2.39 (L) 2.32 (L) 2.08 (L) 2.72 (L)    (H) 104 (H) 105 (H) 104 (H)   Absolute Neutrophil 1.1 (L) 1.4 (L) 1.3 (L) 1.7        4/19/2017 09:15   Sodium 141   Potassium 4.7   Chloride 108   Carbon Dioxide 23   Urea Nitrogen 41 (H)   Creatinine 1.17 (H)   GFR " Estimate 45 (L)   GFR Estimate If Black 55 (L)   Calcium 8.8   Anion Gap 10   Albumin 3.3 (L)   Protein Total 6.9   Bilirubin Total 2.1 (H)   Alkaline Phosphatase 62   ALT 22   AST 19        4/19/2017 10:00   Cyclosporine Level 200     ASSESSMENT/PLAN: 71 year old female with AA, currently pancytopenic 2/2 to disease and further worsening from recent therapy    HEME- Treatment for AA 1/9-1/13/17 with ATG, methylpred, cyclosporine and eltrombopag.   -cyclosporine 75 mg BID Continue weekly checks, keep level between 200-350. Next check 4/5 at [facility] (every Wednesday).  Level last week was 200 which is low, but she threw up her pills twice so will keep at this dose until check this week.  -continue 5 mg Prednisone until 5/5.  4/7-4/20 she will take 5 mg daily.  4/21-5/5 she will take 5 mg QOD.  Then stop.  -eltrombopag 150 mg daily  -Will need transfusion support 2 x week, worked on schedule today mostly at  [facility] (Sun + Wed at  [facility]), I think we can start moving to once a week per her counts/transfusion needs  -transfuse if hgb <8 and platelets <30k     CV-two prior provoked DVT in 2012 and 2014 with travel. 2012 was associated with PE. Was on warfarin/lovenox in the past.  Transitioned to ASA which has been held in the setting of low platelets  -Norvasc stopped 1/25, BP stable    ID-  No fever or symptoms of infection. ANC has been >1 for two months now  - OK to stop Levaquin and fluconazole  - CMV was neg  - inh pentamidine done 3/24/17, will set up this month at [facility]    MSK- low back -Low thoracic mid spine pain.  Last CXR showed compression fractures in her vertebral bodies.  Need to get some further imaging at some point.  For now continue prn tylenol and oxycodone.  Pain is stable    FEN: weight down a couple pounds.  Eating soft foods due to dysphagia concerns (see below), encouraged 1 + L hydration daily and nutritional supplements    GI:   -mild constipation, continue alternating senna + miralax.   -bili elevation,  hemolysis was r/o, its all unconjugated- started with therapy likely 2/2 to csa   -Dyspepsia increase protonix to BID  -take ODT zofran prn nausea  -uncertain etiology of the dysphagia/gagging- will order a video swallow study     Sejal Walls PA-C

## 2017-04-26 ENCOUNTER — INFUSION THERAPY VISIT (OUTPATIENT)
Dept: INFUSION THERAPY | Facility: CLINIC | Age: 72
End: 2017-04-26
Attending: INTERNAL MEDICINE
Payer: COMMERCIAL

## 2017-04-26 ENCOUNTER — HOSPITAL ENCOUNTER (OUTPATIENT)
Facility: CLINIC | Age: 72
Setting detail: SPECIMEN
Discharge: HOME | End: 2017-04-26
Attending: INTERNAL MEDICINE | Admitting: INTERNAL MEDICINE
Payer: COMMERCIAL

## 2017-04-26 VITALS
RESPIRATION RATE: 18 BRPM | DIASTOLIC BLOOD PRESSURE: 80 MMHG | HEART RATE: 84 BPM | TEMPERATURE: 98.3 F | OXYGEN SATURATION: 93 % | SYSTOLIC BLOOD PRESSURE: 131 MMHG

## 2017-04-26 DIAGNOSIS — D61.3 IDIOPATHIC APLASTIC ANEMIA (H): Primary | ICD-10-CM

## 2017-04-26 DIAGNOSIS — D61.818 PANCYTOPENIA (H): ICD-10-CM

## 2017-04-26 LAB
ABO + RH BLD: NORMAL
ABO + RH BLD: NORMAL
ALBUMIN SERPL-MCNC: 3.3 G/DL (ref 3.4–5)
ALP SERPL-CCNC: 67 U/L (ref 40–150)
ALT SERPL W P-5'-P-CCNC: 28 U/L (ref 0–50)
ANION GAP SERPL CALCULATED.3IONS-SCNC: 9 MMOL/L (ref 3–14)
AST SERPL W P-5'-P-CCNC: 21 U/L (ref 0–45)
BASOPHILS # BLD AUTO: 0 10E9/L (ref 0–0.2)
BASOPHILS NFR BLD AUTO: 0 %
BILIRUB SERPL-MCNC: 2.1 MG/DL (ref 0.2–1.3)
BLD GP AB SCN SERPL QL: NORMAL
BLOOD BANK CMNT PATIENT-IMP: NORMAL
BUN SERPL-MCNC: 37 MG/DL (ref 7–30)
CALCIUM SERPL-MCNC: 8.5 MG/DL (ref 8.5–10.1)
CHLORIDE SERPL-SCNC: 108 MMOL/L (ref 94–109)
CO2 SERPL-SCNC: 23 MMOL/L (ref 20–32)
CREAT SERPL-MCNC: 1.21 MG/DL (ref 0.52–1.04)
CYCLOSPORINE BLD LC/MS/MS-MCNC: 194 UG/L (ref 50–400)
DIFFERENTIAL METHOD BLD: ABNORMAL
EOSINOPHIL # BLD AUTO: 0 10E9/L (ref 0–0.7)
EOSINOPHIL NFR BLD AUTO: 0.3 %
ERYTHROCYTE [DISTWIDTH] IN BLOOD BY AUTOMATED COUNT: ABNORMAL % (ref 10–15)
GFR SERPL CREATININE-BSD FRML MDRD: 44 ML/MIN/1.7M2
GLUCOSE SERPL-MCNC: 133 MG/DL (ref 70–99)
HCT VFR BLD AUTO: 26.9 % (ref 35–47)
HGB BLD-MCNC: 9 G/DL (ref 11.7–15.7)
IMM GRANULOCYTES # BLD: 0 10E9/L (ref 0–0.4)
IMM GRANULOCYTES NFR BLD: 0 %
LYMPHOCYTES # BLD AUTO: 2.2 10E9/L (ref 0.8–5.3)
LYMPHOCYTES NFR BLD AUTO: 55.3 %
MCH RBC QN AUTO: 35.7 PG (ref 26.5–33)
MCHC RBC AUTO-ENTMCNC: 33.5 G/DL (ref 31.5–36.5)
MCV RBC AUTO: 107 FL (ref 78–100)
MONOCYTES # BLD AUTO: 0.3 10E9/L (ref 0–1.3)
MONOCYTES NFR BLD AUTO: 8 %
NEUTROPHILS # BLD AUTO: 1.5 10E9/L (ref 1.6–8.3)
NEUTROPHILS NFR BLD AUTO: 36.4 %
NRBC # BLD AUTO: 0 10*3/UL
NRBC BLD AUTO-RTO: 1 /100
PLATELET # BLD AUTO: 44 10E9/L (ref 150–450)
POTASSIUM SERPL-SCNC: 5.3 MMOL/L (ref 3.4–5.3)
PROT SERPL-MCNC: 6.9 G/DL (ref 6.8–8.8)
RBC # BLD AUTO: 2.52 10E12/L (ref 3.8–5.2)
SODIUM SERPL-SCNC: 140 MMOL/L (ref 133–144)
SPECIMEN EXP DATE BLD: NORMAL
TME LAST DOSE: NORMAL H
WBC # BLD AUTO: 4 10E9/L (ref 4–11)

## 2017-04-26 PROCEDURE — 94640 AIRWAY INHALATION TREATMENT: CPT

## 2017-04-26 PROCEDURE — 86850 RBC ANTIBODY SCREEN: CPT | Performed by: INTERNAL MEDICINE

## 2017-04-26 PROCEDURE — 86900 BLOOD TYPING SEROLOGIC ABO: CPT | Performed by: INTERNAL MEDICINE

## 2017-04-26 PROCEDURE — 94642 AEROSOL INHALATION TREATMENT: CPT

## 2017-04-26 PROCEDURE — 80053 COMPREHEN METABOLIC PANEL: CPT | Performed by: INTERNAL MEDICINE

## 2017-04-26 PROCEDURE — 86901 BLOOD TYPING SEROLOGIC RH(D): CPT | Performed by: INTERNAL MEDICINE

## 2017-04-26 PROCEDURE — 80158 DRUG ASSAY CYCLOSPORINE: CPT | Performed by: PHYSICIAN ASSISTANT

## 2017-04-26 PROCEDURE — 25000125 ZZHC RX 250: Performed by: NURSE PRACTITIONER

## 2017-04-26 PROCEDURE — 85025 COMPLETE CBC W/AUTO DIFF WBC: CPT | Performed by: INTERNAL MEDICINE

## 2017-04-26 RX ORDER — PENTAMIDINE ISETHIONATE 300 MG/300MG
300 INHALANT RESPIRATORY (INHALATION) ONCE
Status: COMPLETED | OUTPATIENT
Start: 2017-04-26 | End: 2017-04-26

## 2017-04-26 RX ORDER — ALBUTEROL SULFATE 5 MG/ML
2.5 SOLUTION RESPIRATORY (INHALATION) EVERY 6 HOURS PRN
Status: CANCELLED
Start: 2017-05-01

## 2017-04-26 RX ORDER — PSEUDOEPHEDRINE HCL 120 MG/1
120 TABLET, FILM COATED, EXTENDED RELEASE ORAL EVERY 12 HOURS
COMMUNITY
End: 2017-08-02

## 2017-04-26 RX ORDER — PENTAMIDINE ISETHIONATE 300 MG/300MG
300 INHALANT RESPIRATORY (INHALATION) ONCE
Status: CANCELLED
Start: 2017-05-01 | End: 2017-05-01

## 2017-04-26 RX ORDER — ALBUTEROL SULFATE 5 MG/ML
2.5 SOLUTION RESPIRATORY (INHALATION) EVERY 6 HOURS PRN
Status: DISCONTINUED | OUTPATIENT
Start: 2017-04-26 | End: 2017-04-26 | Stop reason: HOSPADM

## 2017-04-26 RX ADMIN — ALBUTEROL SULFATE 2.5 MG: 2.5 SOLUTION RESPIRATORY (INHALATION) at 09:08

## 2017-04-26 RX ADMIN — PENTAMIDINE ISETHIONATE 300 MG: 300 INHALANT RESPIRATORY (INHALATION) at 09:26

## 2017-04-26 ASSESSMENT — PAIN SCALES - GENERAL: PAINLEVEL: MILD PAIN (3)

## 2017-04-26 NOTE — PROGRESS NOTES
Infusion Nursing Note:  Bonny Raymundo presents today for labs/nebs/possible TF.    Patient seen by provider today: No   present during visit today: Not Applicable.    Note: lung sounds clear but diminished in bases bilat. After albuterol neb, lung sounds unchanged.    Intravenous Access:  Labs drawn without difficulty.  PICC.    Treatment Conditions:  Lab Results   Component Value Date    HGB 9.0 04/26/2017     Lab Results   Component Value Date    WBC 4.0 04/26/2017      Lab Results   Component Value Date    ANEU 1.5 04/26/2017     Lab Results   Component Value Date    PLT 44 04/26/2017      Lab Results   Component Value Date     04/26/2017                   Lab Results   Component Value Date    POTASSIUM 5.3 04/26/2017           Lab Results   Component Value Date    MAG 1.4 01/25/2017            Lab Results   Component Value Date    CR 1.21 04/26/2017                   Lab Results   Component Value Date    LEO 8.5 04/26/2017                Lab Results   Component Value Date    BILITOTAL 2.1 04/26/2017           Lab Results   Component Value Date    ALBUMIN 3.3 04/26/2017                    Lab Results   Component Value Date    ALT 28 04/26/2017           Lab Results   Component Value Date    AST 21 04/26/2017     Results reviewed, labs did NOT meet treatment parameters: platelet 44 and HGB 9.      Post Infusion Assessment:  Site patent and intact, free from redness, edema or discomfort.  No evidence of extravasations.  Patient tolerated Neb treatments without any difficulty.    Discharge Plan:   Discharge instructions reviewed with: Patient.  Patient and/or family verbalized understanding of discharge instructions and all questions answered.  Copy of AVS reviewed with patient and/or family.  Patient will return 4/29/17 for next appointment.  Patient discharged in stable condition accompanied by: self.  Departure Mode: Ambulatory.    Lory Buenrostro RN

## 2017-04-26 NOTE — MR AVS SNAPSHOT
After Visit Summary   4/26/2017    Bonny Raymundo    MRN: [id number]           Patient Information     Date Of Birth          1945        Visit Information        Provider Department      4/26/2017 8:30 AM  INFUSION CHAIR 17 [facility]        Today's Diagnoses     Idiopathic aplastic anemia (H)    -  1    Pancytopenia (H)           Follow-ups after your visit        Your next 10 appointments already scheduled     Apr 29, 2017  8:00 AM CDT   Level 5 with  INFUSION CHAIR 14   [facility] ([facility])    [facility]  [address]  [facility] [address]   [phone]            May 02, 2017  1:30 PM CDT   XR VIDEO SPEECH EVALUATION WITH ESOPHAGRAM with XR5   [facility] ([facility])    [address] [facility] [id number] [phone]           Please bring a list of your current medicines to your exam. (Include vitamins, minerals and over-the-counter medicines.) Leave your valuables at home.  Tell the doctor if there is a chance you could be pregnant.  Do not eat for 8 hours before the exam. Keep drinking clear liquids until 2 hours before the exam.  You may take pain medicine (with a sip of water) up to 4 hours before the exam.  Do not swallow any other medicines unless your doctor tells you to. Talk to your doctor to be sure it s safe to stop your medicines.  Please call the Imaging Department at your exam site with any questions.            May 02, 2017  1:30 PM CDT   Evaluation with  SLP OP VFSS   [facility] ([facility])    [address]  [facility] [address] [phone]            May 03, 2017  8:30 AM CDT   Level 5 with  INFUSION CHAIR 3   [facility] ([facility])    [facility]  [address]  [facility] [address]   [phone]            May 04, 2017  1:00 PM CDT   XR ESOPHAGRAM with SHXR5   [facility] ([facility])    [facility] [facility] [address]   [phone]           Please bring a list of your current medicines to your exam. (Include vitamins, minerals and over-the-counter medicines.) Leave your valuables at home.  Tell the doctor if there is a chance you could be pregnant.  Do not eat for 8 hours before the exam. Keep drinking clear liquids until 2 hours before the exam.  You may take pain medicine (with a sip of water) up to 4 hours before the exam.  Do not swallow any other medicines unless your doctor tells you to. Talk to your doctor to be sure it s safe to stop your medicines.  Please call the Imaging Department at your exam site with any questions.            May 04, 2017  4:00 PM CDT   RETURN ONC with Rafita Rogel MD   [facility] ([facility])    [address] [place] [address]   [phone]            May 10, 2017  8:30 AM CDT   Level 5 with SH INFUSION CHAIR 13   [facility] ([facility])    [facility]  [address]  [facility] [address]   [phone]            May 17, 2017  9:00 AM CDT   Level 5 with SH INFUSION CHAIR 18   [facility] ([facility])    [facility]  [address]  [facility] [address]   [phone]            May 24, 2017  8:30 AM CDT   Level 5 with SH INFUSION CHAIR 3   [facility] ([facility])    [facility]  [address]  [facility] [address]   [phone]            May 31, 2017  8:00 AM CDT   Level 5 with SH INFUSION CHAIR 14   [facility] ([place]  [facility])    [facility]  [address]  [place] [address] [phone]              Future tests that were ordered for you today     Open Future Orders        Priority Expected Expires Ordered    XR Esophagram Routine 2017    XR Video Swallow w Esophagram Routine 2017            Who to contact     If you have questions or need follow up information about today's clinic visit or your schedule please contact [facility] CANCER CLINIC AND [facility] directly at [phone].  Normal or non-critical lab and imaging results will be communicated to you by [employer], letter or phone within 4 business days after the clinic has received the results. If you do not hear from us within 7 days, please contact the clinic through [employer] or phone. If you have a critical or abnormal lab result, we will notify you by phone as soon as possible.  Submit refill requests through [employer] or call your pharmacy and they will forward the refill request to us. Please allow 3 business days for your refill to be completed.          Additional Information About Your Visit        [employer] Information     [employer] lets you send messages to your doctor, view your test results, renew your prescriptions, schedule appointments and more. To sign up, go to [email or web address] . Click on \"Log in\" on the left side of the screen, which will take you to the Welcome page. Then click on \"Sign up Now\" on the right side of the page.     You will be asked to enter the access code listed below, as well as some personal information. Please follow the directions to create your username and password.     Your access code is: [email or web address]  Expires: 2017  1:59 PM     Your access code will  in 90 days. If you need help or a new code, please call your [place] clinic or [phone].        Care EveryWhere ID     This is your Care EveryWhere ID. This could be " used by other organizations to access your [facility] medical records  [id number]        Your Vitals Were     Pulse Temperature Respirations Pulse Oximetry          84 98.3  F (36.8  C) (Oral) 18 93%         Blood Pressure from Last 3 Encounters:   04/26/17 131/80   04/24/17 138/82   04/23/17 126/76    Weight from Last 3 Encounters:   04/24/17 58.7 kg (129 lb 4.8 oz)   04/04/17 59.5 kg (131 lb 1.6 oz)   03/09/17 59.3 kg (130 lb 11.7 oz)              We Performed the Following     ABO/Rh type and screen     CBC with platelets differential     Comprehensive metabolic panel     Cyclosporine        Primary Care Provider Office Phone # Fax #    Rafita Rogel [phone]        [facility] [address] [facility] [address]  [facility] [address]        Thank you!     Thank you for choosing [facility] CANCER [facility] AND [facility]ENTER  for your care. Our goal is always to provide you with excellent care. Hearing back from our patients is one way we can continue to improve our services. Please take a few minutes to complete the written survey that you may receive in the mail after your visit with us. Thank you!             Your Updated Medication List - Protect others around you: Learn how to safely use, store and throw away your medicines at www.disposemymeds.org.          This list is accurate as of: 4/26/17 10:16 AM.  Always use your most recent med list.                   Brand Name Dispense Instructions for use    BENADRYL ALLERGY 25 MG tablet   Generic drug:  diphenhydrAMINE     56 tablet    Take 1 tablet (25 mg) by mouth nightly as needed       chlorpheniramine 4 MG tablet    CHLOR-TRIMETON     Take 4 mg by mouth every 6 hours as needed. For head cold       COMPRESSION STOCKINGS     2 each    Wear compression stockings at 20-30 mmHg rating most time during the day to the affected leg (left leg) or both legs. Take them off at night.       cycloSPORINE modified 25 MG capsule    GENERIC EQUIVALENT    300  capsule    Take 75 mg by mouth 2 times daily       diclofenac 1 % Gel topical gel    VOLTAREN    100 g    Apply 2 g topically 2 times daily       eltrombopag 50 MG tablet    PROMACTA    90 tablet    Take 3 tablets (150 mg) by mouth daily Administer on an empty stomach, 1 hour before or 2 hours after a meal. Or as directed       fluconazole 200 MG tablet    DIFLUCAN    30 tablet    Take 1 tablet (200 mg) by mouth daily       loratadine 10 MG tablet    CLARITIN     Take 10 mg by mouth daily as needed Reported on 4/26/2017       nystatin 035737 UNIT/ML suspension    MYCOSTATIN    280 mL    Take 5 mLs (500,000 Units) by mouth 4 times daily Swish and spit.       ondansetron 4 MG ODT tab    ZOFRAN-ODT    20 tablet    Take 1 tablet (4 mg) by mouth every 6 hours as needed for nausea       order for DME     1 Box    Equipment being ordered: knee high compression stockings- 18-20 mm       oxyCODONE 5 MG IR tablet    ROXICODONE    50 tablet    Take 1 tablet (5 mg) by mouth every 6 hours as needed for moderate to severe pain       pantoprazole 40 MG EC tablet    PROTONIX    30 tablet    Take 1 tablet (40 mg) by mouth every morning       polyethylene glycol Packet    MIRALAX/GLYCOLAX    14 packet    Take 17 g by mouth daily . If you start to have loose stools/diarrhea or multiple bowel movements, ok to hold this medication. Then resume if no bowel movement after 24-48hours.       pseudoePHEDrine 120 MG 12 hr tablet    SUDAFED     Take 120 mg by mouth every 12 hours       SENNA S 8.6-50 MG per tablet   Generic drug:  senna-docusate     100 tablet    Can take 1-2 tablets up to twice a day. Can start with 1-2 tablets daily. If no bowel movement within 24 hours, can take 1-2 tablets twice a day. HOLD if you develop diarrhea. Resume taking if no bowel movement in 24 hours.       TYLENOL PO      Take 325 mg by mouth every 6 hours as needed for mild pain or fever

## 2017-04-27 ENCOUNTER — CARE COORDINATION (OUTPATIENT)
Dept: ONCOLOGY | Facility: CLINIC | Age: 72
End: 2017-04-27

## 2017-04-27 NOTE — PROGRESS NOTES
Called patient per Sejal RAYA-Butch request to inform her that her CSA level was just below the normal level at 194.  Sejal would like her to increase her CSA to 100 MG BID.  Bonny verbalized understanding and was grateful for the follow up. She will increase her CSA starting tonight to 100 MG.

## 2017-04-29 ENCOUNTER — HOSPITAL ENCOUNTER (OUTPATIENT)
Facility: CLINIC | Age: 72
Setting detail: SPECIMEN
Discharge: HOME | End: 2017-04-29
Attending: PHYSICIAN ASSISTANT | Admitting: PHYSICIAN ASSISTANT
Payer: COMMERCIAL

## 2017-04-29 ENCOUNTER — INFUSION THERAPY VISIT (OUTPATIENT)
Dept: INFUSION THERAPY | Facility: CLINIC | Age: 72
End: 2017-04-29
Attending: INTERNAL MEDICINE
Payer: COMMERCIAL

## 2017-04-29 VITALS
RESPIRATION RATE: 16 BRPM | HEART RATE: 91 BPM | DIASTOLIC BLOOD PRESSURE: 77 MMHG | TEMPERATURE: 98.5 F | OXYGEN SATURATION: 99 % | SYSTOLIC BLOOD PRESSURE: 129 MMHG

## 2017-04-29 DIAGNOSIS — D61.818 PANCYTOPENIA (H): ICD-10-CM

## 2017-04-29 DIAGNOSIS — D61.3 IDIOPATHIC APLASTIC ANEMIA (H): Primary | ICD-10-CM

## 2017-04-29 LAB
BASOPHILS # BLD AUTO: 0 10E9/L (ref 0–0.2)
BASOPHILS NFR BLD AUTO: 0 %
BLD PROD TYP BPU: NORMAL
BLD PROD TYP BPU: NORMAL
BLD UNIT ID BPU: 0
BLOOD PRODUCT CODE: NORMAL
BPU ID: NORMAL
DIFFERENTIAL METHOD BLD: ABNORMAL
EOSINOPHIL # BLD AUTO: 0 10E9/L (ref 0–0.7)
EOSINOPHIL NFR BLD AUTO: 0.5 %
ERYTHROCYTE [DISTWIDTH] IN BLOOD BY AUTOMATED COUNT: ABNORMAL % (ref 10–15)
HCT VFR BLD AUTO: 26 % (ref 35–47)
HGB BLD-MCNC: 8.9 G/DL (ref 11.7–15.7)
IMM GRANULOCYTES # BLD: 0 10E9/L (ref 0–0.4)
IMM GRANULOCYTES NFR BLD: 0.2 %
LYMPHOCYTES # BLD AUTO: 2.3 10E9/L (ref 0.8–5.3)
LYMPHOCYTES NFR BLD AUTO: 54.4 %
MCH RBC QN AUTO: 36.5 PG (ref 26.5–33)
MCHC RBC AUTO-ENTMCNC: 34.2 G/DL (ref 31.5–36.5)
MCV RBC AUTO: 107 FL (ref 78–100)
MONOCYTES # BLD AUTO: 0.4 10E9/L (ref 0–1.3)
MONOCYTES NFR BLD AUTO: 9.4 %
NEUTROPHILS # BLD AUTO: 1.5 10E9/L (ref 1.6–8.3)
NEUTROPHILS NFR BLD AUTO: 35.5 %
NRBC # BLD AUTO: 0.1 10*3/UL
NRBC BLD AUTO-RTO: 1 /100
NUM BPU REQUESTED: 1
PLATELET # BLD AUTO: 26 10E9/L (ref 150–450)
RBC # BLD AUTO: 2.44 10E12/L (ref 3.8–5.2)
TRANSFUSION STATUS PATIENT QL: NORMAL
TRANSFUSION STATUS PATIENT QL: NORMAL
WBC # BLD AUTO: 4.2 10E9/L (ref 4–11)

## 2017-04-29 PROCEDURE — P9037 PLATE PHERES LEUKOREDU IRRAD: HCPCS | Performed by: PHYSICIAN ASSISTANT

## 2017-04-29 PROCEDURE — 85025 COMPLETE CBC W/AUTO DIFF WBC: CPT | Performed by: PHYSICIAN ASSISTANT

## 2017-04-29 PROCEDURE — 36430 TRANSFUSION BLD/BLD COMPNT: CPT

## 2017-04-29 ASSESSMENT — PAIN SCALES - GENERAL: PAINLEVEL: NO PAIN (0)

## 2017-04-29 NOTE — MR AVS SNAPSHOT
After Visit Summary   4/29/2017    Bonny Raymundo    MRN: [id number]           Patient Information     Date Of Birth          1945        Visit Information        Provider Department      4/29/2017 8:00 AM  INFUSION CHAIR 14 [facility]        Today's Diagnoses     Idiopathic aplastic anemia (H)    -  1    Pancytopenia (H)           Follow-ups after your visit        Your next 10 appointments already scheduled     May 02, 2017  1:30 PM CDT   XR VIDEO SPEECH EVALUATION WITH ESOPHAGRAM with SHXR5   [facility] Radiology ([facility])    [address] [address] [phone]           Please bring a list of your current medicines to your exam. (Include vitamins, minerals and over-the-counter medicines.) Leave your valuables at home.  Tell the doctor if there is a chance you could be pregnant.  Do not eat for 8 hours before the exam. Keep drinking clear liquids until 2 hours before the exam.  You may take pain medicine (with a sip of water) up to 4 hours before the exam.  Do not swallow any other medicines unless your doctor tells you to. Talk to your doctor to be sure it s safe to stop your medicines.  Please call the Imaging Department at your exam site with any questions.            May 02, 2017  1:30 PM CDT   Evaluation with  SLP OP VFSS   [facility] Speech Therapy [facility])    [address]  [facility] [address]   [phone]            May 03, 2017  8:30 AM CDT   Level 5 with  INFUSION CHAIR 3   [facility] ([facility])    [facility]  [address]  [facility] [address]   [phone]            May 04, 2017  1:00 PM CDT   XR ESOPHAGRAM with SHXR5   [facility]iology [facility])    [address] [id number] [phone]           Please bring  a list of your current medicines to your exam. (Include vitamins, minerals and over-the-counter medicines.) Leave your valuables at home.  Tell the doctor if there is a chance you could be pregnant.  Do not eat for 8 hours before the exam. Keep drinking clear liquids until 2 hours before the exam.  You may take pain medicine (with a sip of water) up to 4 hours before the exam.  Do not swallow any other medicines unless your doctor tells you to. Talk to your doctor to be sure it s safe to stop your medicines.  Please call the Imaging Department at your exam site with any questions.            May 04, 2017  4:00 PM CDT   RETURN ONC with Rafita Rogel MD   [facility] Blood and Marrow Transplant ([facility] [place])    [address] [address]   [phone]            May 10, 2017  8:30 AM CDT   Level 5 with  INFUSION CHAIR 13   [facility] ([facility])    [facility]  [address]  [facility] [address]   [phone]            May 17, 2017  9:00 AM CDT   Level 5 with  INFUSION CHAIR 18   [facility] ([facility])    [facility]  [address]  [facility] [address]   [phone]            May 24, 2017  8:30 AM CDT   Level 5 with  INFUSION CHAIR 3   [facility] ([facility])    [facility]  [address]  [facility] [address]   [phone]            May 31, 2017  8:00 AM CDT   Level 5 with  INFUSION CHAIR 14   [facility] ([facility])    [facility]  [address]  [place] [address]   [phone]            Jun 07, 2017  8:30 AM CDT   Level 5 with  INFUSION CHAIR 18   [facility] ([facility]  "[facility])    [facility]  [address]  [place] [address]   [phone]              Future tests that were ordered for you today     Open Standing Orders        Priority Remaining Interval Expires Ordered    Platelets prepare order unit Routine 99/100 CONDITIONAL (SPECIFY) BLOOD  2017    Transfuse platelets unit Routine 99/100 TRANSFUSE 1 DOSE  2017            Who to contact     If you have questions or need follow up information about today's clinic visit or your schedule please contact [facility] directly at [phone].  Normal or non-critical lab and imaging results will be communicated to you by Room n Househart, letter or phone within 4 business days after the clinic has received the results. If you do not hear from us within 7 days, please contact the clinic through Room n Househart or phone. If you have a critical or abnormal lab result, we will notify you by phone as soon as possible.  Submit refill requests through Spling or call your pharmacy and they will forward the refill request to us. Please allow 3 business days for your refill to be completed.          Additional Information About Your Visit        MyChart Information     Spling lets you send messages to your doctor, view your test results, renew your prescriptions, schedule appointments and more. To sign up, go to [email or web address] . Click on \"Log in\" on the left side of the screen, which will take you to the Welcome page. Then click on \"Sign up Now\" on the right side of the page.     You will be asked to enter the access code listed below, as well as some personal information. Please follow the directions to create your username and password.     Your access code is: [email or web address]  Expires: 2017  1:59 PM     Your access code will  in 90 days. If you need help or a new code, please call your [facility] or [phone].        Care EveryWhere ID     This is your " Care EveryWhere ID. This could be used by other organizations to access your [facility] medical records  [id number]        Your Vitals Were     Pulse Temperature Respirations Pulse Oximetry          91 98.5  F (36.9  C) (Oral) 16 99%         Blood Pressure from Last 3 Encounters:   04/29/17 129/77   04/26/17 131/80   04/24/17 138/82    Weight from Last 3 Encounters:   04/24/17 58.7 kg (129 lb 4.8 oz)   04/04/17 59.5 kg (131 lb 1.6 oz)   03/09/17 59.3 kg (130 lb 11.7 oz)              We Performed the Following     Blood component     CBC with platelets differential     Obtain affirmation of informed consent     Platelets prepare order unit     Transfuse platelets unit        Primary Care Provider Office Phone # Fax #    Rafita Rogel [phone]        [facility] [address] [address]  [facility] [address]        Thank you!     Thank you for choosing [facility]  for your care. Our goal is always to provide you with excellent care. Hearing back from our patients is one way we can continue to improve our services. Please take a few minutes to complete the written survey that you may receive in the mail after your visit with us. Thank you!             Your Updated Medication List - Protect others around you: Learn how to safely use, store and throw away your medicines at www.disposemymeds.org.          This list is accurate as of: 4/29/17 10:24 AM.  Always use your most recent med list.                   Brand Name Dispense Instructions for use    BENADRYL ALLERGY 25 MG tablet   Generic drug:  diphenhydrAMINE     56 tablet    Take 1 tablet (25 mg) by mouth nightly as needed       chlorpheniramine 4 MG tablet    CHLOR-TRIMETON     Take 4 mg by mouth every 6 hours as needed. For head cold       COMPRESSION STOCKINGS     2 each    Wear compression stockings at 20-30 mmHg rating most time during the day to the affected leg (left leg) or both legs. Take them off  at night.       cycloSPORINE modified 25 MG capsule    GENERIC EQUIVALENT    300 capsule    Take 75 mg by mouth 2 times daily       diclofenac 1 % Gel topical gel    VOLTAREN    100 g    Apply 2 g topically 2 times daily       eltrombopag 50 MG tablet    PROMACTA    90 tablet    Take 3 tablets (150 mg) by mouth daily Administer on an empty stomach, 1 hour before or 2 hours after a meal. Or as directed       fluconazole 200 MG tablet    DIFLUCAN    30 tablet    Take 1 tablet (200 mg) by mouth daily       loratadine 10 MG tablet    CLARITIN     Take 10 mg by mouth daily as needed Reported on 4/26/2017       nystatin 301917 UNIT/ML suspension    MYCOSTATIN    280 mL    Take 5 mLs (500,000 Units) by mouth 4 times daily Swish and spit.       ondansetron 4 MG ODT tab    ZOFRAN-ODT    20 tablet    Take 1 tablet (4 mg) by mouth every 6 hours as needed for nausea       order for DME     1 Box    Equipment being ordered: knee high compression stockings- 18-20 mm       oxyCODONE 5 MG IR tablet    ROXICODONE    50 tablet    Take 1 tablet (5 mg) by mouth every 6 hours as needed for moderate to severe pain       pantoprazole 40 MG EC tablet    PROTONIX    30 tablet    Take 1 tablet (40 mg) by mouth every morning       polyethylene glycol Packet    MIRALAX/GLYCOLAX    14 packet    Take 17 g by mouth daily . If you start to have loose stools/diarrhea or multiple bowel movements, ok to hold this medication. Then resume if no bowel movement after 24-48hours.       pseudoePHEDrine 120 MG 12 hr tablet    SUDAFED     Take 120 mg by mouth every 12 hours       SENNA S 8.6-50 MG per tablet   Generic drug:  senna-docusate     100 tablet    Can take 1-2 tablets up to twice a day. Can start with 1-2 tablets daily. If no bowel movement within 24 hours, can take 1-2 tablets twice a day. HOLD if you develop diarrhea. Resume taking if no bowel movement in 24 hours.       TYLENOL PO      Take 325 mg by mouth every 6 hours as needed for mild pain  or fever

## 2017-04-29 NOTE — PROGRESS NOTES
Infusion Nursing Note:  Bonny Raymundo presents today for cbc and possible tx.    Patient seen by provider today: No   present during visit today: Not Applicable.    Note: N/A.    Intravenous Access:  Labs drawn without difficulty.  PICC.    Treatment Conditions:  Lab Results   Component Value Date    HGB 8.9 04/29/2017     Lab Results   Component Value Date    WBC 4.2 04/29/2017      Lab Results   Component Value Date    ANEU 1.5 04/29/2017     Lab Results   Component Value Date    PLT 26 04/29/2017      Results reviewed, labs MET treatment parameters for 1 dose platelets, ok to proceed with treatment.  Blood transfusion consent signed 10/6/16.      Post Infusion Assessment:  Patient tolerated infusion without incident.  Blood return noted pre and post infusion.  Site patent and intact, free from redness, edema or discomfort.  No evidence of extravasations.    Discharge Plan:   Patient discharged in stable condition accompanied by: self.  Departure Mode: Ambulatory.    Guerline Robles RN

## 2017-04-29 NOTE — PROGRESS NOTES
"1115: Received call from Bonny who is now at home stating she was having itching. Platelet transfusion completed at 1019. Writer asked patient when the itching started, she stated it did start when she was here in infusion but she did not report this. States she took 25mg of benadryl at home. Reports itching is \"all over\" on her ankles, forearms, back, chest and neck. States there is redness where she has been itching but no visible hives or rash. Writer asked patient if she was having any difficulty breathing, she responded \"there is a little of that but better when I am sitting.\" Writer informed patient she is likely having a reaction to the platelets and recommended she be seen immediately in the emergency room. Patient states she wanted to try taking another benadryl and give it some time. Writer again strongly suggested patient be seen in the emergency room for further assessment, patient stated understanding. Will send message to patient's care coordinator to update Dr. Rogel and add premeds to future transfusions. Demetrice Connolly RN  "

## 2017-05-02 ENCOUNTER — HOSPITAL ENCOUNTER (OUTPATIENT)
Dept: SPEECH THERAPY | Facility: CLINIC | Age: 72
End: 2017-05-02
Attending: PHYSICIAN ASSISTANT
Payer: COMMERCIAL

## 2017-05-02 ENCOUNTER — HOSPITAL ENCOUNTER (OUTPATIENT)
Dept: GENERAL RADIOLOGY | Facility: CLINIC | Age: 72
Discharge: HOME | End: 2017-05-02
Attending: PHYSICIAN ASSISTANT | Admitting: PHYSICIAN ASSISTANT
Payer: COMMERCIAL

## 2017-05-02 DIAGNOSIS — R13.10 DYSPHAGIA, UNSPECIFIED TYPE: ICD-10-CM

## 2017-05-02 PROCEDURE — 92611 MOTION FLUOROSCOPY/SWALLOW: CPT | Mod: GN | Performed by: SPEECH-LANGUAGE PATHOLOGIST

## 2017-05-02 PROCEDURE — 40000211 ZZHC STATISTIC SLP  DEPARTMENT VISIT: Performed by: SPEECH-LANGUAGE PATHOLOGIST

## 2017-05-02 PROCEDURE — G8998 SWALLOW D/C STATUS: HCPCS | Mod: GN,CI | Performed by: SPEECH-LANGUAGE PATHOLOGIST

## 2017-05-02 PROCEDURE — G8996 SWALLOW CURRENT STATUS: HCPCS | Mod: GN,CI | Performed by: SPEECH-LANGUAGE PATHOLOGIST

## 2017-05-02 PROCEDURE — 74230 X-RAY XM SWLNG FUNCJ C+: CPT

## 2017-05-02 PROCEDURE — G8997 SWALLOW GOAL STATUS: HCPCS | Mod: GN,CI | Performed by: SPEECH-LANGUAGE PATHOLOGIST

## 2017-05-02 NOTE — PROGRESS NOTES
05/02/17 1420   General Information   Type Of Visit Initial   Start Of Care Date 05/02/17   Referring Physician Celine RAYA   Medical Diagnosis Dysphagia   Onset Of Illness/injury Or Date Of Surgery (Unknown; Symptoms for at least a month)   Patient/family Goals To increase po intake and decrease difficulty swallowing.  Patient to continue self selection of softer foods.  Patient to proceed with an esophagram to further assess esophageal function.   General Information Comments Patient reports decreased po intake, globus sensation, belching, and decreased po intake for at least a month with weight loss of 4-5 pounds noted.     FALL RISK SCREEN   Comments See radiology staff documentation   VFSS Eval: Radiology   Radiologist Dr. García   Views Taken left lateral   Physical Location of Procedure [facility]   VFSS Eval: Thin Liquid Texture Trial   Mode of Presentation, Thin Liquid cup;straw   Order of Presentation 1, 2, 3, 4   Preparatory Phase WFL   Oral Phase, Thin Liquid WFL   Pharyngeal Phase, Thin Liquid (no delay)   Rosenbek's Penetration Aspiration Scale: Thin Liquid Trial Results 2 - contrast enters airway, remains above the vocal cords, no residue remains (penetration)   Diagnostic Statement Minimal decreased epiglottic inversion, bolus under the epiglottis with slight flash penetration at times, min-no pharyngeal residue and min residue at UES, prominent UES noted   VFSS Eval: Nectar Thick Liquid Texture Trial   Order of Presentation Did not test given performance with other textures   VFSS Eval: Honey Thick Texture Trial   Order of Presentation Did not test given performance with other textures   VFSS Eval: Puree Solid Texture Trial   Mode of Presentation, Puree spoon   Order of Presentation 5   Preparatory Phase WFL   Oral Phase, Puree WFL   Pharyngeal Phase, Puree (no delay)   Rosenbek's Penetration Aspiration Scale: Puree Food Trial Results 1 - no aspiration, contrast does not enter airway   Diagnostic  Statement Minimal decreased epiglottic inversion, min-no pharyngeal residue and min residue at UES, prominent UES noted   VFSS Eval: Semisolid Texture Trial   Mode of Presentation, Semisolid spoon   Order of Presentation 6   Preparatory Phase WFL   Oral Phase, Semisolid WFL   Pharyngeal Phase, Semisolid (no delay)   Rosenbek's Penetration Aspiration Scale: Semisolid Food Trial Results 1 - no aspiration, contrast does not enter airway   Diagnostic Statement Minimal decreased epiglottic inversion, min-no pharyngeal residue and min residue at UES, prominent UES noted   VFSS Eval: Solid Food Texture Trial   Mode of Presentation, Solid spoon   Order of Presentation 7   Preparatory Phase WFL   Oral Phase, Solid WFL   Pharyngeal Phase, Solid (no delay)   Rosenbek's Penetration Aspiration Scale: Solid Food Trial Results 1 - no aspiration, contrast does not enter airway   Diagnostic Statement Minimal decreased epiglottic inversion, min-no pharyngeal residue and min residue at UES, prominent UES noted   Educational Assessment   Barriers to Learning No barriers   Preferred Learning Style Demonstration;Listening;Pictures/video   Esophageal Phase of Swallow   Patient reports or presents with symptoms of esophageal dysphagia Yes   Esophageal sweep performed during today s vidofluoroscopic exam  No   Esophageal comments Esophagram scheduled to evaluate further   Swallow Eval: Clinical Impressions   Skilled Criteria for Therapy Intervention No problems identified which require skilled intervention   Functional Assessment Scale (FAS) 6   Dysphagia Outcome Severity Scale (KARIS) Level 6 - KARIS   Treatment Diagnosis minimal-no oral-pharyngeal dysphagia   Diet texture recommendations Regular diet;Thin liquids  (soft, moist selections)   Rehab Potential good, to achieve stated therapy goals   Therapy Frequency (N/A)   Predicted Duration of Therapy Intervention (days/wks) (N/A)   Risks and Benefits of Treatment have been explained. Yes    Patient, family and/or staff in agreement with Plan of Care Yes   Clinical Impression Comments Patient presents with minimal-no oral-pharyngeal dysphagia per study results.  Deficits include minimal decreased epiglottic inversion and prominent UES.  Deficits resulted in min-no pharyngeal residue, min residue at UES, and bolus under the epiglottis/slight flash penetration with thin liquids.  No signficant penetration or aspiration observed.  Recommend patient proceed with an esophagram to fully evaluate esophageal function given report of symptoms.  Recommend a regular diet and thin liquids with soft moist food selections and alternate liquids and solids.  No swallow Tx is indicated at this time.   Total Session Time   Total Evaluation Time 1:35-2:00pm (25 minutes)   Swallowing   Swallowing:  Current Status , Goal , Discharge -Qfff Only-Modifier the same for all G-codes CI: 1-19% impairment   Swallowing: Current  & Discharge Modifier Rationale-Eval Only PO Trials

## 2017-05-03 ENCOUNTER — HOSPITAL ENCOUNTER (OUTPATIENT)
Facility: CLINIC | Age: 72
Setting detail: SPECIMEN
Discharge: HOME | End: 2017-05-03
Attending: PHYSICIAN ASSISTANT | Admitting: PHYSICIAN ASSISTANT
Payer: COMMERCIAL

## 2017-05-03 ENCOUNTER — INFUSION THERAPY VISIT (OUTPATIENT)
Dept: INFUSION THERAPY | Facility: CLINIC | Age: 72
End: 2017-05-03
Attending: INTERNAL MEDICINE
Payer: COMMERCIAL

## 2017-05-03 VITALS
RESPIRATION RATE: 18 BRPM | OXYGEN SATURATION: 94 % | SYSTOLIC BLOOD PRESSURE: 126 MMHG | HEART RATE: 94 BPM | TEMPERATURE: 97.7 F | DIASTOLIC BLOOD PRESSURE: 80 MMHG

## 2017-05-03 DIAGNOSIS — D61.3 IDIOPATHIC APLASTIC ANEMIA (H): Primary | ICD-10-CM

## 2017-05-03 DIAGNOSIS — D61.818 PANCYTOPENIA (H): ICD-10-CM

## 2017-05-03 LAB
ALBUMIN SERPL-MCNC: 3.3 G/DL (ref 3.4–5)
ALP SERPL-CCNC: 73 U/L (ref 40–150)
ALT SERPL W P-5'-P-CCNC: 22 U/L (ref 0–50)
ANION GAP SERPL CALCULATED.3IONS-SCNC: 11 MMOL/L (ref 3–14)
AST SERPL W P-5'-P-CCNC: 13 U/L (ref 0–45)
BASOPHILS # BLD AUTO: 0 10E9/L (ref 0–0.2)
BASOPHILS NFR BLD AUTO: 0 %
BILIRUB SERPL-MCNC: 2.2 MG/DL (ref 0.2–1.3)
BUN SERPL-MCNC: 32 MG/DL (ref 7–30)
CALCIUM SERPL-MCNC: 8.7 MG/DL (ref 8.5–10.1)
CHLORIDE SERPL-SCNC: 108 MMOL/L (ref 94–109)
CO2 SERPL-SCNC: 23 MMOL/L (ref 20–32)
CREAT SERPL-MCNC: 1.06 MG/DL (ref 0.52–1.04)
CYCLOSPORINE BLD LC/MS/MS-MCNC: 184 UG/L (ref 50–400)
DIFFERENTIAL METHOD BLD: ABNORMAL
EOSINOPHIL # BLD AUTO: 0 10E9/L (ref 0–0.7)
EOSINOPHIL NFR BLD AUTO: 0.5 %
ERYTHROCYTE [DISTWIDTH] IN BLOOD BY AUTOMATED COUNT: ABNORMAL % (ref 10–15)
GFR SERPL CREATININE-BSD FRML MDRD: 51 ML/MIN/1.7M2
GLUCOSE SERPL-MCNC: 120 MG/DL (ref 70–99)
HCT VFR BLD AUTO: 25.4 % (ref 35–47)
HGB BLD-MCNC: 8.7 G/DL (ref 11.7–15.7)
IMM GRANULOCYTES # BLD: 0 10E9/L (ref 0–0.4)
IMM GRANULOCYTES NFR BLD: 0.3 %
LYMPHOCYTES # BLD AUTO: 1.9 10E9/L (ref 0.8–5.3)
LYMPHOCYTES NFR BLD AUTO: 50.1 %
MCH RBC QN AUTO: 36.9 PG (ref 26.5–33)
MCHC RBC AUTO-ENTMCNC: 34.3 G/DL (ref 31.5–36.5)
MCV RBC AUTO: 108 FL (ref 78–100)
MICROCYTES BLD QL SMEAR: PRESENT
MONOCYTES # BLD AUTO: 0.4 10E9/L (ref 0–1.3)
MONOCYTES NFR BLD AUTO: 9.7 %
NEUTROPHILS # BLD AUTO: 1.5 10E9/L (ref 1.6–8.3)
NEUTROPHILS NFR BLD AUTO: 39.4 %
NRBC # BLD AUTO: 0 10*3/UL
NRBC BLD AUTO-RTO: 1 /100
PLATELET # BLD AUTO: 50 10E9/L (ref 150–450)
PLATELET # BLD EST: ABNORMAL 10*3/UL
POIKILOCYTOSIS BLD QL SMEAR: ABNORMAL
POTASSIUM SERPL-SCNC: 3.7 MMOL/L (ref 3.4–5.3)
PROT SERPL-MCNC: 7.1 G/DL (ref 6.8–8.8)
RBC # BLD AUTO: 2.36 10E12/L (ref 3.8–5.2)
SODIUM SERPL-SCNC: 142 MMOL/L (ref 133–144)
TME LAST DOSE: NORMAL H
WBC # BLD AUTO: 3.8 10E9/L (ref 4–11)

## 2017-05-03 PROCEDURE — 80158 DRUG ASSAY CYCLOSPORINE: CPT | Performed by: PHYSICIAN ASSISTANT

## 2017-05-03 PROCEDURE — 80053 COMPREHEN METABOLIC PANEL: CPT | Performed by: PHYSICIAN ASSISTANT

## 2017-05-03 PROCEDURE — 85025 COMPLETE CBC W/AUTO DIFF WBC: CPT | Performed by: PHYSICIAN ASSISTANT

## 2017-05-03 PROCEDURE — 36592 COLLECT BLOOD FROM PICC: CPT

## 2017-05-03 ASSESSMENT — PAIN SCALES - GENERAL: PAINLEVEL: MILD PAIN (2)

## 2017-05-03 NOTE — MR AVS SNAPSHOT
After Visit Summary   5/3/2017    Bonny Raymundo    MRN: [id number]           Patient Information     Date Of Birth          1945        Visit Information        Provider Department      5/3/2017 8:30 AM  INFUSION CHAIR 3 [facility]        Today's Diagnoses     Idiopathic aplastic anemia (H)    -  1    Pancytopenia (H)           Follow-ups after your visit        Your next 10 appointments already scheduled     May 04, 2017  1:00 PM CDT   XR ESOPHAGRAM with SHXR5   [facility] ([facility])    [facility] [facility] [address]   [phone]           Please bring a list of your current medicines to your exam. (Include vitamins, minerals and over-the-counter medicines.) Leave your valuables at home.  Tell the doctor if there is a chance you could be pregnant.  Do not eat for 8 hours before the exam. Keep drinking clear liquids until 2 hours before the exam.  You may take pain medicine (with a sip of water) up to 4 hours before the exam.  Do not swallow any other medicines unless your doctor tells you to. Talk to your doctor to be sure it s safe to stop your medicines.  Please call the Imaging Department at your exam site with any questions.            May 04, 2017  4:00 PM CDT   RETURN ONC with Rafita Rogel MD   [facility] ([facility])    [address] [place] [id number] [phone]            May 10, 2017  8:30 AM CDT   Level 5 with  INFUSION CHAIR 13   [facility] ([facility])    [facility]  [address]  [facility] [address]   [phone]            May 17, 2017  9:00 AM CDT   Level 5 with  INFUSION CHAIR 18   [facility] ([facility])    [facility]  [address]  [place] [address]   [phone]            May 24, 2017  8:30 AM CDT   Level 5 with SH INFUSION CHAIR 3   [facility] ([facility])    [facility] [place] [address] [place] [address]   [phone]            May 31, 2017  8:00 AM CDT   Level 5 with SH INFUSION CHAIR 14   [facility] ([facility])    [facility] [place] [address]  [address]  [place] [address]   [phone]            Jun 07, 2017  8:30 AM CDT   Level 5 with SH INFUSION CHAIR 18   [facility] ([facility])    [facility] [address] [address]  [place] [address]   [phone]              Who to contact     If you have questions or need follow up information about today's clinic visit or your schedule please contact [facility] directly at [phone].  Normal or non-critical lab and imaging results will be communicated to you by [employer], letter or phone within 4 business days after the clinic has received the results. If you do not hear from us within 7 days, please contact the clinic through [employer] or phone. If you have a critical or abnormal lab result, we will notify you by phone as soon as possible.  Submit refill requests through [employer] or call your pharmacy and they will forward the refill request to us. Please allow 3 business days for your refill to be completed.          Additional Information About Your Visit        [employer] Information     [employer] lets you send messages to your doctor, view your test results, renew your prescriptions, schedule appointments and more. To sign up, go to [email or web address] . Click on \"Log in\" on the left side of the screen, which will take you to the Welcome page. Then click on \"Sign up Now\" on the right side of the page.     You will be asked " to enter the access code listed below, as well as some personal information. Please follow the directions to create your username and password.     Your access code is: [email or web address]  Expires: 2017  1:59 PM     Your access code will  in 90 days. If you need help or a new code, please call your [facility] or [phone].        Care EveryWhere ID     This is your Care EveryWhere ID. This could be used by other organizations to access your [facility] medical records  [id number]        Your Vitals Were     Pulse Temperature Respirations Pulse Oximetry          94 97.7  F (36.5  C) (Oral) 18 94%         Blood Pressure from Last 3 Encounters:   17 126/80   17 129/77   17 131/80    Weight from Last 3 Encounters:   17 58.7 kg (129 lb 4.8 oz)   17 59.5 kg (131 lb 1.6 oz)   17 59.3 kg (130 lb 11.7 oz)              We Performed the Following     CBC with platelets differential     Comprehensive metabolic panel     Cyclosporine        Primary Care Provider Office Phone # Fax #    Rafita Rogel [phone]        [facility] [address] [facility] [address]  [facility] [address]        Thank you!     Thank you for choosing [facility]  for your care. Our goal is always to provide you with excellent care. Hearing back from our patients is one way we can continue to improve our services. Please take a few minutes to complete the written survey that you may receive in the mail after your visit with us. Thank you!             Your Updated Medication List - Protect others around you: Learn how to safely use, store and throw away your medicines at www.disposemymeds.org.          This list is accurate as of: 5/3/17 10:23 AM.  Always use your most recent med list.                   Brand Name Dispense Instructions for use    BENADRYL ALLERGY 25 MG tablet   Generic drug:  diphenhydrAMINE     56 tablet    Take 1 tablet (25 mg) by mouth  nightly as needed       chlorpheniramine 4 MG tablet    CHLOR-TRIMETON     Take 4 mg by mouth every 6 hours as needed. For head cold       COMPRESSION STOCKINGS     2 each    Wear compression stockings at 20-30 mmHg rating most time during the day to the affected leg (left leg) or both legs. Take them off at night.       cycloSPORINE modified 25 MG capsule    GENERIC EQUIVALENT    300 capsule    Take 75 mg by mouth 2 times daily       diclofenac 1 % Gel topical gel    VOLTAREN    100 g    Apply 2 g topically 2 times daily       eltrombopag 50 MG tablet    PROMACTA    90 tablet    Take 3 tablets (150 mg) by mouth daily Administer on an empty stomach, 1 hour before or 2 hours after a meal. Or as directed       fluconazole 200 MG tablet    DIFLUCAN    30 tablet    Take 1 tablet (200 mg) by mouth daily       loratadine 10 MG tablet    CLARITIN     Take 10 mg by mouth daily as needed Reported on 4/26/2017       nystatin 368438 UNIT/ML suspension    MYCOSTATIN    280 mL    Take 5 mLs (500,000 Units) by mouth 4 times daily Swish and spit.       ondansetron 4 MG ODT tab    ZOFRAN-ODT    20 tablet    Take 1 tablet (4 mg) by mouth every 6 hours as needed for nausea       order for DME     1 Box    Equipment being ordered: knee high compression stockings- 18-20 mm       oxyCODONE 5 MG IR tablet    ROXICODONE    50 tablet    Take 1 tablet (5 mg) by mouth every 6 hours as needed for moderate to severe pain       pantoprazole 40 MG EC tablet    PROTONIX    30 tablet    Take 1 tablet (40 mg) by mouth every morning       polyethylene glycol Packet    MIRALAX/GLYCOLAX    14 packet    Take 17 g by mouth daily . If you start to have loose stools/diarrhea or multiple bowel movements, ok to hold this medication. Then resume if no bowel movement after 24-48hours.       pseudoePHEDrine 120 MG 12 hr tablet    SUDAFED     Take 120 mg by mouth every 12 hours       SENNA S 8.6-50 MG per tablet   Generic drug:  senna-docusate     100 tablet     Can take 1-2 tablets up to twice a day. Can start with 1-2 tablets daily. If no bowel movement within 24 hours, can take 1-2 tablets twice a day. HOLD if you develop diarrhea. Resume taking if no bowel movement in 24 hours.       TYLENOL PO      Take 325 mg by mouth every 6 hours as needed for mild pain or fever

## 2017-05-03 NOTE — PROGRESS NOTES
Infusion Nursing Note:  Bonny Raymundo presents today for labs, possible transfusion.    Patient seen by provider today: No   present during visit today: Not Applicable.    Note: Patient reports no new medical concerns, refer to doc flowsheet for assessment. Denies any signs of bleeding. Patient did have a reaction after her platelet transfusion on Saturday (see infusion note from 4/29). Patient did not go to the emergency room as recommended, states she took another benadryl and her itching subsided after awhile. Dr. Rogel is aware, refer to his note in orders only encounter from 5/1. Per Dr. Rogel's note, platelet parameters were changed to 20,000 or less and PO benadryl added prior to platelet transfusions, however the orders do not reflect this. Patient did not meet parameters for PRBC or platelet transfusion today, inbasket message sent to Dr. Rogel to update orders for future transfusions.    Intravenous Access:  PICC.    Treatment Conditions:  Lab Results   Component Value Date    HGB 8.7 05/03/2017     Lab Results   Component Value Date    WBC 3.8 05/03/2017      Lab Results   Component Value Date    ANEU 1.5 05/03/2017     Lab Results   Component Value Date    PLT 50 05/03/2017      Results reviewed, labs did NOT meet treatment parameters for PRBCs or platelets.      Post Infusion Assessment:  Site patent and intact, free from redness, edema or discomfort.  No evidence of extravasations.    Discharge Plan:   Discharge instructions reviewed with: Patient.  Patient and/or family verbalized understanding of discharge instructions and all questions answered.  Copy of AVS reviewed with patient and/or family.  Patient will return 5/10 for next appointment, sees Dr. Rogel tomorrow at [facility].  Patient discharged in stable condition accompanied by: self.  Departure Mode: Ambulatory.    Demetrice Connolly RN

## 2017-05-04 ENCOUNTER — HOSPITAL ENCOUNTER (OUTPATIENT)
Dept: GENERAL RADIOLOGY | Facility: CLINIC | Age: 72
Discharge: HOME | End: 2017-05-04
Attending: PHYSICIAN ASSISTANT | Admitting: PHYSICIAN ASSISTANT
Payer: COMMERCIAL

## 2017-05-04 ENCOUNTER — OFFICE VISIT (OUTPATIENT)
Dept: TRANSPLANT | Facility: CLINIC | Age: 72
End: 2017-05-04
Attending: INTERNAL MEDICINE
Payer: COMMERCIAL

## 2017-05-04 VITALS
BODY MASS INDEX: 22.73 KG/M2 | SYSTOLIC BLOOD PRESSURE: 167 MMHG | WEIGHT: 128.31 LBS | DIASTOLIC BLOOD PRESSURE: 97 MMHG | RESPIRATION RATE: 18 BRPM | HEART RATE: 101 BPM | HEIGHT: 63 IN | OXYGEN SATURATION: 95 % | TEMPERATURE: 97.4 F

## 2017-05-04 DIAGNOSIS — K64.9 HEMORRHOIDS, UNSPECIFIED HEMORRHOID TYPE: ICD-10-CM

## 2017-05-04 DIAGNOSIS — K21.9 GASTROESOPHAGEAL REFLUX DISEASE WITHOUT ESOPHAGITIS: ICD-10-CM

## 2017-05-04 DIAGNOSIS — M54.50 LEFT-SIDED LOW BACK PAIN WITHOUT SCIATICA, UNSPECIFIED CHRONICITY: ICD-10-CM

## 2017-05-04 DIAGNOSIS — R13.10 DYSPHAGIA, UNSPECIFIED TYPE: ICD-10-CM

## 2017-05-04 DIAGNOSIS — K59.00 CONSTIPATION, UNSPECIFIED CONSTIPATION TYPE: ICD-10-CM

## 2017-05-04 PROCEDURE — 74220 X-RAY XM ESOPHAGUS 1CNTRST: CPT

## 2017-05-04 PROCEDURE — 25500045 ZZH RX 255: Performed by: PHYSICIAN ASSISTANT

## 2017-05-04 PROCEDURE — 99212 OFFICE O/P EST SF 10 MIN: CPT | Mod: ZF

## 2017-05-04 RX ORDER — PANTOPRAZOLE SODIUM 40 MG/1
40 TABLET, DELAYED RELEASE ORAL 2 TIMES DAILY
Qty: 60 TABLET | Refills: 3 | Status: SHIPPED | OUTPATIENT
Start: 2017-05-04 | End: 2017-10-12

## 2017-05-04 RX ORDER — CYCLOSPORINE 100 MG/1
100 CAPSULE, LIQUID FILLED ORAL 2 TIMES DAILY
Qty: 120 CAPSULE | COMMUNITY
Start: 2017-05-04 | End: 2017-07-20

## 2017-05-04 RX ORDER — CYCLOSPORINE 100 MG/1
100 CAPSULE, LIQUID FILLED ORAL DAILY
COMMUNITY
Start: 2017-03-03 | End: 2017-05-04

## 2017-05-04 RX ORDER — SUCRALFATE ORAL 1 G/10ML
1 SUSPENSION ORAL 2 TIMES DAILY PRN
Qty: 1200 ML | Refills: 3 | Status: SHIPPED | OUTPATIENT
Start: 2017-05-04 | End: 2017-07-13

## 2017-05-04 RX ORDER — AMOXICILLIN 250 MG
CAPSULE ORAL
Qty: 100 TABLET | Refills: 1 | COMMUNITY
Start: 2017-05-04 | End: 2017-08-02

## 2017-05-04 RX ORDER — POLYETHYLENE GLYCOL 3350 17 G/17G
17 POWDER, FOR SOLUTION ORAL DAILY PRN
Qty: 14 PACKET | Refills: 0 | COMMUNITY
Start: 2017-05-04 | End: 2017-08-02

## 2017-05-04 RX ADMIN — ANTACID/ANTIFLATULENT 4 G: 380; 550; 10; 10 GRANULE, EFFERVESCENT ORAL at 13:44

## 2017-05-04 ASSESSMENT — PAIN SCALES - GENERAL: PAINLEVEL: MILD PAIN (3)

## 2017-05-04 NOTE — MR AVS SNAPSHOT
After Visit Summary   5/4/2017    Bonny Raymundo    MRN: [id number]           Patient Information     Date Of Birth          1945        Visit Information        Provider Department      5/4/2017 4:00 PM Rafita Rogel MD [facility] Blood and Marrow Transplant        Today's Diagnoses     Gastroesophageal reflux disease without esophagitis        Left-sided low back pain without sciatica, unspecified chronicity        Constipation, unspecified constipation type        Hemorrhoids, unspecified hemorrhoid type              [facility] ([facility])  [address] [place] [address]  Phone: [phone]  Clinic Hours:   Monday-Thursday: 7am to 7pm   Friday: 7am to 5:30pm   Weekends and holidays:    8am to noon (in general)  If your fever is 100.5  or greater,   call the clinic.  After hours call the   hospital at [phone] or   [phone]. Ask for the BMT   fellow on-call            Follow-ups after your visit        Follow-up notes from your care team     Return in about 4 weeks (around 6/1/2017).      Your next 10 appointments already scheduled     May 10, 2017  8:30 AM CDT   Level 5 with SH INFUSION CHAIR 13   [facility] ([facility])    [facility]  [address]  [facility] [address]   [phone]            May 17, 2017  9:00 AM CDT   Level 5 with SH INFUSION CHAIR 18   [facility] ([facility])    [facility]  [address]  [facility] [address]   [phone]            May 24, 2017  8:30 AM CDT   Level 5 with SH INFUSION CHAIR 3   [facility] ([facility])    [facility]  [address]  [facility] [address]   [phone]            May 31, 2017  8:00 AM CDT   Level 5 with SH INFUSION CHAIR 14   [facility] ([facility])    [facility]  [address]  [facility] [address]   [phone]            Jun 01, 2017  4:00 PM CDT   RETURN ONC with Rafita Rogel MD   [facility] Blood and Marrow Trans[facility] ([facility])    [address] [facility]  2nd Floor  [facility] [address] [phone]            Jun 07, 2017  8:30 AM CDT   Level 5 with  INFUSION CHAIR 18   [facility] ([facility])    [facility]  [address]  [facility] [address]   [phone]              Future tests that were ordered for you today     Open Future Orders        Priority Expected Expires Ordered    Magnesium Routine  10/31/2017 5/4/2017    Protein electrophoresis Routine  10/31/2017 5/4/2017    Reticulocyte count Routine  10/31/2017 5/4/2017            Who to contact     If you have questions or need follow up information about today's clinic visit or your schedule please contact [facility] BLOOD [facility]ARROW TRANSPLANT directly at [phone].  Normal or non-critical lab and imaging results will be communicated to you by [employer], letter or phone within 4 business days after the clinic has received the results. If you do not hear from us within 7 days, please contact the clinic through [employer] or phone. If you have a critical or abnormal lab result, we will notify you by phone as soon as possible.  Submit refill requests through [employer] or call your pharmacy and they will forward the refill request to us. Please allow 3 business days for your refill to be completed.          Additional Information About Your Visit        [employer] Information     [employer] lets you send messages to your doctor, view your test results, renew your prescriptions, schedule appointments and more. To sign up, go to [email or web address] . Click on \"Log in\" on the left side of the screen, which will " "take you to the Welcome page. Then click on \"Sign up Now\" on the right side of the page.     You will be asked to enter the access code listed below, as well as some personal information. Please follow the directions to create your username and password.     Your access code is: [email or web address]  Expires: 2017  1:59 PM     Your access code will  in 90 days. If you need help or a new code, please call your [facility] clinic or [phone].        Care EveryWhere ID     This is your Care EveryWhere ID. This could be used by other organizations to access your [facility] medical records  [id number]        Your Vitals Were     Pulse Temperature Respirations Height Pulse Oximetry BMI (Body Mass Index)    101 97.4  F (36.3  C) (Oral) 18 1.6 m (5' 2.99\") 95% 22.73 kg/m2       Blood Pressure from Last 3 Encounters:   17 (!) 167/97   17 126/80   17 129/77    Weight from Last 3 Encounters:   17 58.2 kg (128 lb 4.9 oz)   17 58.7 kg (129 lb 4.8 oz)   17 59.5 kg (131 lb 1.6 oz)                 Today's Medication Changes          These changes are accurate as of: 17 11:59 PM.  If you have any questions, ask your nurse or doctor.               Start taking these medicines.        Dose/Directions    sucralfate 1 GM/10ML suspension   Commonly known as:  CARAFATE   Used for:  Gastroesophageal reflux disease without esophagitis, Left-sided low back pain without sciatica, unspecified chronicity, Constipation, unspecified constipation type, Hemorrhoids, unspecified hemorrhoid type   Started by:  Rafita Rogel MD        Dose:  1 g   Take 10 mLs (1 g) by mouth 2 times daily as needed   Quantity:  1200 mL   Refills:  3         These medicines have changed or have updated prescriptions.        Dose/Directions    * cycloSPORINE modified 25 MG capsule   Commonly known as:  GENERIC EQUIVALENT   This may have changed:  Another medication with the same name was changed. Make sure you " understand how and when to take each.   Used for:  Idiopathic aplastic anemia (H), Pancytopenia (H)   Changed by:  Celine Walls PA-C        Dose:  75 mg   Take 75 mg by mouth 2 times daily   Quantity:  300 capsule   Refills:  3       * cycloSPORINE modified 100 MG capsule   Commonly known as:  GENERIC EQUIVALENT   This may have changed:  when to take this   Used for:  Gastroesophageal reflux disease without esophagitis, Left-sided low back pain without sciatica, unspecified chronicity, Constipation, unspecified constipation type, Hemorrhoids, unspecified hemorrhoid type   Changed by:  Rafita Rogel MD        Dose:  100 mg   Take 1 capsule (100 mg) by mouth 2 times daily Reported on 5/4/2017   Quantity:  120 capsule   Refills:  0       pantoprazole 40 MG EC tablet   Commonly known as:  PROTONIX   This may have changed:  when to take this   Used for:  Gastroesophageal reflux disease without esophagitis, Left-sided low back pain without sciatica, unspecified chronicity, Constipation, unspecified constipation type, Hemorrhoids, unspecified hemorrhoid type   Changed by:  Rafita Rogel MD        Dose:  40 mg   Take 1 tablet (40 mg) by mouth 2 times daily   Quantity:  60 tablet   Refills:  3       polyethylene glycol Packet   Commonly known as:  MIRALAX/GLYCOLAX   This may have changed:    - when to take this  - reasons to take this   Used for:  Hemorrhoids, unspecified hemorrhoid type, Gastroesophageal reflux disease without esophagitis, Left-sided low back pain without sciatica, unspecified chronicity, Constipation, unspecified constipation type   Changed by:  Rafita Rogel MD        Dose:  17 g   Take 17 g by mouth daily as needed for constipation . If you start to have loose stools/diarrhea or multiple bowel movements, ok to hold this medication. Then resume if no bowel movement after 24-48hours.   Quantity:  14 packet   Refills:  0       SENNA S 8.6-50 MG per tablet   This may  have changed:  additional instructions   Used for:  Left-sided low back pain without sciatica, unspecified chronicity, Constipation, unspecified constipation type, Gastroesophageal reflux disease without esophagitis, Hemorrhoids, unspecified hemorrhoid type   Generic drug:  senna-docusate   Changed by:  Rafita Rogel MD        Can take 1-2 tablets every other day   Quantity:  100 tablet   Refills:  1       * Notice:  This list has 2 medication(s) that are the same as other medications prescribed for you. Read the directions carefully, and ask your doctor or other care provider to review them with you.      Stop taking these medicines if you haven't already. Please contact your care team if you have questions.     fluconazole 200 MG tablet   Commonly known as:  DIFLUCAN   Stopped by:  Rafita Rogel MD           nystatin 017119 UNIT/ML suspension   Commonly known as:  MYCOSTATIN   Stopped by:  Rafita Rogel MD                Where to get your medicines      These medications were sent to [facility] [address]  [address] [facility] [address] [address]     Phone:  [phone]     pantoprazole 40 MG EC tablet    sucralfate 1 GM/10ML suspension                Recent Review Flowsheet Data     BMT Recent Results Latest Ref Rng & Units 4/16/2017 4/19/2017 4/19/2017 4/23/2017 4/26/2017 4/29/2017 5/3/2017    WBC 4.0 - 11.0 10e9/L 3.8(L) Canceled, Test credited  Unsatisfactory specimen - tube underfilled 3.5(L) 4.7 4.0 4.2 3.8(L)    Hemoglobin 11.7 - 15.7 g/dL 8.3(L) Canceled, Test credited  Unsatisfactory specimen - tube underfilled 7.6(L) 9.8(L) 9.0(L) 8.9(L) 8.7(L)    Platelet Count 150 - 450 10e9/L 18(LL) Canceled, Test credited  Unsatisfactory specimen - tube underfilled 65(L) 24(LL) 44(LL) 26(LL) 50(L)    Neutrophils (Absolute) 1.6 - 8.3 10e9/L 1.4(L) - 1.3(L) 1.7 1.5(L) 1.5(L) 1.5(L)    INR 0.86 - 1.14 - - - - - - -    Sodium 133 - 144 mmol/L - 141  - - 140 - 142    Potassium 3.4 - 5.3 mmol/L - 4.7 - - 5.3 - 3.7    Chloride 94 - 109 mmol/L - 108 - - 108 - 108    Glucose 70 - 99 mg/dL - 113(H) - - 133(H) - 120(H)    Urea Nitrogen 7 - 30 mg/dL - 41(H) - - 37(H) - 32(H)    Creatinine 0.52 - 1.04 mg/dL - 1.17(H) - - 1.21(H) - 1.06(H)    Calcium (Total) 8.5 - 10.1 mg/dL - 8.8 - - 8.5 - 8.7    Protein (Total) 6.8 - 8.8 g/dL - 6.9 - - 6.9 - 7.1    Albumin 3.4 - 5.0 g/dL - 3.3(L) - - 3.3(L) - 3.3(L)    Alkaline Phosphatase 40 - 150 U/L - 62 - - 67 - 73    AST 0 - 45 U/L - 19 - - 21 - 13    ALT 0 - 50 U/L - 22 - - 28 - 22    MCV 78 - 100 fl 104(H) Canceled, Test credited  Unsatisfactory specimen - tube underfilled 105(H) 104(H) 107(H) 107(H) 108(H)               Primary Care Provider Office Phone # Fax #    Rafita Rogel [phone]        [facility] [address] [facility] [address]  [facility] [address]        Thank you!     Thank you for choosing [facility]LOOD [facility]ANS[facility]  for your care. Our goal is always to provide you with excellent care. Hearing back from our patients is one way we can continue to improve our services. Please take a few minutes to complete the written survey that you may receive in the mail after your visit with us. Thank you!             Your Updated Medication List - Protect others around you: Learn how to safely use, store and throw away your medicines at www.disposemymeds.org.          This list is accurate as of: 5/4/17 11:59 PM.  Always use your most recent med list.                   Brand Name Dispense Instructions for use    BENADRYL ALLERGY 25 MG tablet   Generic drug:  diphenhydrAMINE     56 tablet    Take 1 tablet (25 mg) by mouth nightly as needed       chlorpheniramine 4 MG tablet    CHLOR-TRIMETON     Take 4 mg by mouth every 6 hours as needed. For head cold       COMPRESSION STOCKINGS     2 each    Wear compression stockings at 20-30 mmHg rating most time during the day to the affected leg (left leg)  or both legs. Take them off at night.       * cycloSPORINE modified 25 MG capsule    GENERIC EQUIVALENT    300 capsule    Take 75 mg by mouth 2 times daily       * cycloSPORINE modified 100 MG capsule    GENERIC EQUIVALENT    120 capsule    Take 1 capsule (100 mg) by mouth 2 times daily Reported on 5/4/2017       diclofenac 1 % Gel topical gel    VOLTAREN    100 g    Apply 2 g topically 2 times daily       eltrombopag 50 MG tablet    PROMACTA    90 tablet    Take 3 tablets (150 mg) by mouth daily Administer on an empty stomach, 1 hour before or 2 hours after a meal. Or as directed       loratadine 10 MG tablet    CLARITIN     Take 10 mg by mouth daily as needed Reported on 4/26/2017       ondansetron 4 MG ODT tab    ZOFRAN-ODT    20 tablet    Take 1 tablet (4 mg) by mouth every 6 hours as needed for nausea       order for DME     1 Box    Equipment being ordered: knee high compression stockings- 18-20 mm       oxyCODONE 5 MG IR tablet    ROXICODONE    50 tablet    Take 1 tablet (5 mg) by mouth every 6 hours as needed for moderate to severe pain       pantoprazole 40 MG EC tablet    PROTONIX    60 tablet    Take 1 tablet (40 mg) by mouth 2 times daily       polyethylene glycol Packet    MIRALAX/GLYCOLAX    14 packet    Take 17 g by mouth daily as needed for constipation . If you start to have loose stools/diarrhea or multiple bowel movements, ok to hold this medication. Then resume if no bowel movement after 24-48hours.       pseudoePHEDrine 120 MG 12 hr tablet    SUDAFED     Take 120 mg by mouth every 12 hours       SENNA S 8.6-50 MG per tablet   Generic drug:  senna-docusate     100 tablet    Can take 1-2 tablets every other day       sucralfate 1 GM/10ML suspension    CARAFATE    1200 mL    Take 10 mLs (1 g) by mouth 2 times daily as needed       TYLENOL PO      Take 325 mg by mouth every 6 hours as needed for mild pain or fever       * Notice:  This list has 2 medication(s) that are the same as other medications  prescribed for you. Read the directions carefully, and ask your doctor or other care provider to review them with you.

## 2017-05-04 NOTE — NURSING NOTE
"Oncology Rooming Note    May 4, 2017 4:26 PM   Bonny Raymundo is a 71 year old female who presents for:    Chief Complaint   Patient presents with     Oncology Clinic Visit     Pancytopenia     Initial Vitals: BP (!) 167/97 (BP Location: Left arm, Patient Position: Chair, Cuff Size: Adult Regular)  Pulse 101  Temp 97.4  F (36.3  C) (Oral)  Resp 18  Ht 1.6 m (5' 2.99\")  Wt 58.2 kg (128 lb 4.9 oz)  SpO2 95%  BMI 22.73 kg/m2 Estimated body mass index is 22.73 kg/(m^2) as calculated from the following:    Height as of this encounter: 1.6 m (5' 2.99\").    Weight as of this encounter: 58.2 kg (128 lb 4.9 oz). Body surface area is 1.61 meters squared.  Mild Pain (3) Comment: Mid back   No LMP recorded. Patient has had a hysterectomy.  Allergies reviewed: Yes  Medications reviewed: Yes    Medications: Medication refills not needed today.  Pharmacy name entered into [facility]:    [facility], [place] - [address]  [facility], [place] - [address]  [facility] [place] [place] - [facility] [place] [facility]  WRITTEN PRESCRIPTION REQUESTED    Clinical concerns: No new concerns  Provider was notified.    7 minutes for nursing intake (face to face time)     Jaqui Manriquez MA              "

## 2017-05-04 NOTE — LETTER
"5/4/2017      RE: Bonny Raymundo  [address]  [facility] [address]       BP (!) 167/97 (BP Location: Left arm, Patient Position: Chair, Cuff Size: Adult Regular)  Pulse 101  Temp 97.4  F (36.3  C) (Oral)  Resp 18  Ht 1.6 m (5' 2.99\")  Wt 58.2 kg (128 lb 4.9 oz)  SpO2 95%  BMI 22.73 kg/m2  Wt Readings from Last 4 Encounters:   05/04/17 58.2 kg (128 lb 4.9 oz)   04/24/17 58.7 kg (129 lb 4.8 oz)   04/04/17 59.5 kg (131 lb 1.6 oz)   03/09/17 59.3 kg (130 lb 11.7 oz)     HISTORY OF PRESENT ILLNESS: The patient is seen in the outpatient oncology clinic.       Bonny returns to followup her aplastic anemia. She is now 3-1/2 months since her treatment with ATG, cyclosporine and steroids. She is just completing the taper of steroids and is continuing on therapy with cyclosporine now 100 mg twice daily and eltrombopag 150 mg daily.       She has had a slow incremental response with a neutrophil count now above 1000 and a platelet count between 20,000 and 30,000, though it is uncertain if she is possibly transfusion independent at this point. She has needed red cells still periodically but less frequently.       In the last month or two, she has had no fevers, chills, rash, bleeding or bruising. She has had some dysphagia and sometimes feeling like she is choking. A swallow study several days ago and video esophagram today showed no specific lesions (mild esophageal dysmotility) and her discomfort may be due to esophageal reflux. Her Protonix has been increased to twice daily and today I suggested she initiate Carafate slurry as well. Her other complaint besides fatigue and general lack of energy is soreness on her left chest, somewhat in a dermatomal-like stripe around her L mid thorax at the lower end of her bra. There are some focal spots of tenderness and some small hyperpigmented areas, but she has not been aware of any previous rash and certainly no vesicles. It is suspicious that this might have been a " truncated version of shingles for which she has residual postherpetic neuralgia, but that is not for sure. The pain is not pleuritic or sore when she twists, but when she sits up or lies down and some specific focal areas on her ribs are tender but pushing on the same rib away from that area does not re-elicit the pain suggesting it is not a rib-specific injury. There are no other areas of bone tenderness. The rest of review of systems is unrevealing.       PHYSICAL EXAMINATION:   VITAL SIGNS: Her exam shows her weight stable over the last 2 months but the recent dysphagia led to a few pounds of weight loss. Her blood pressure was elevated on arrival  today, but her last few blood pressures have not been high, though that has been monitored closely with her cyclosporine therapy.   GENERAL: She was alert, comfortable and in no distress.   EXTREMITIES: She had no lower extremity edema.   SKIN: No petechiae. No inflammatory skin rash.   HEENT: There are no oral lesions.   MUSCULOSKELETAL: She had no focal bone tenderness except the lateral mid portion of her mid thoracic rib on the left side.   LUNGS: Clear without rales or wheezes.   CARDIOVASCULAR: Her heart tones are regular without gallop or murmur.   ABDOMEN: No carotid or abdominal bruit. No palpable masses or hepatosplenomegaly.       LABORATORY: Laboratory testing showed acceptable electrolytes and recent testing showed normal liver function. Her blood counts remained low and she has not had a retic count done in a while, but her neutrophil count is now 1500 without growth factor support. Cyclosporine level today was 184.       ASSESSMENT/PLAN: She has had a slow and demonstrable partial response to her immunosuppressive therapy and she will now discontinue low-dose prednisone (she has been on 5 mg every other day for the last 2 weeks) and will continue the cyclosporine and eltrombopag only along with additional supportive care. As noted above, we will add  Carafate to her GI regimen and continue to watch her closely.       I reviewed again the general plan of her treatment that her immunosuppression with cyclosporine will be to some extent indefinite because with her slow but progressive response in her neutrophil production and possible increase in her platelet and red cell production (we will measure a retic count at next visit) we will expect to potentially see further improvement over time.       Her questions were answered in full, and I told her she should continue the weekly blood checks and transfusions as needed through [facility] and we will see her here at the [place] once monthly, but she knows to call if additional issues arise.       NOTE: Though she has had many platelet transfusions, last week she had a first itching reaction soon after 1 platelet transfusion. We will add oral Benadryl to the premedication, but if the symptomatology does not recur, we may discontinue that so she does not have the dry mouth and sleepiness associated with Benadryl therapy every time.       Overall she is doing well and the encouraging partial response over the last period of time is hopeful.       Rafita Rogel MD   Professor of Medicine       Results for LISSETH PARIKH (MRN [id number]) as of 5/5/2017 09:12   Ref. Range 5/3/2017 08:44 5/4/2017 13:49   Sodium Latest Ref Range: 133 - 144 mmol/L 142    Potassium Latest Ref Range: 3.4 - 5.3 mmol/L 3.7    Chloride Latest Ref Range: 94 - 109 mmol/L 108    Carbon Dioxide Latest Ref Range: 20 - 32 mmol/L 23    Urea Nitrogen Latest Ref Range: 7 - 30 mg/dL 32 (H)    Creatinine Latest Ref Range: 0.52 - 1.04 mg/dL 1.06 (H)    GFR Estimate Latest Ref Range: >60 mL/min/1.7m2 51 (L)    GFR Estimate If Black Latest Ref Range: >60 mL/min/1.7m2 61    Calcium Latest Ref Range: 8.5 - 10.1 mg/dL 8.7    Anion Gap Latest Ref Range: 3 - 14 mmol/L 11    Albumin Latest Ref Range: 3.4 - 5.0 g/dL 3.3 (L)    Protein Total Latest Ref Range:  6.8 - 8.8 g/dL 7.1    Bilirubin Total Latest Ref Range: 0.2 - 1.3 mg/dL 2.2 (H)    Alkaline Phosphatase Latest Ref Range: 40 - 150 U/L 73    ALT Latest Ref Range: 0 - 50 U/L 22    AST Latest Ref Range: 0 - 45 U/L 13    Glucose Latest Ref Range: 70 - 99 mg/dL 120 (H)    WBC Latest Ref Range: 4.0 - 11.0 10e9/L 3.8 (L)    Hemoglobin Latest Ref Range: 11.7 - 15.7 g/dL 8.7 (L)    Hematocrit Latest Ref Range: 35.0 - 47.0 % 25.4 (L)    Platelet Count Latest Ref Range: 150 - 450 10e9/L 50 (L)    RBC Count Latest Ref Range: 3.8 - 5.2 10e12/L 2.36 (L)    MCV Latest Ref Range: 78 - 100 fl 108 (H)    MCH Latest Ref Range: 26.5 - 33.0 pg 36.9 (H)    MCHC Latest Ref Range: 31.5 - 36.5 g/dL 34.3    RDW Latest Ref Range: 10.0 - 15.0 % Dimorphic populat...    Diff Method Unknown Automated Method    % Neutrophils Latest Units: % 39.4    % Lymphocytes Latest Units: % 50.1    % Monocytes Latest Units: % 9.7    % Eosinophils Latest Units: % 0.5    % Basophils Latest Units: % 0.0    % Immature Granulocytes Latest Units: % 0.3    Nucleated RBCs Latest Ref Range: 0 /100 1 (H)    Absolute Neutrophil Latest Ref Range: 1.6 - 8.3 10e9/L 1.5 (L)    Absolute Lymphocytes Latest Ref Range: 0.8 - 5.3 10e9/L 1.9    Absolute Monocytes Latest Ref Range: 0.0 - 1.3 10e9/L 0.4    Absolute Eosinophils Latest Ref Range: 0.0 - 0.7 10e9/L 0.0    Absolute Basophils Latest Ref Range: 0.0 - 0.2 10e9/L 0.0    Abs Immature Granulocytes Latest Ref Range: 0 - 0.4 10e9/L 0.0    Absolute Nucleated RBC Unknown 0.0    Poikilocytosis Unknown Marked    Microcytes Unknown Present    Platelet Estimate Unknown Confirming automa...    Cyclosporine Last Dose Unknown NOT PROVIDED    Cyclosporine Level Latest Ref Range: 50 - 400 ug/L 184    XR ESOPHAGRAM Unknown  Rpt       HISTORY OF PRESENT ILLNESS:  The patient is seen in the outpatient oncology clinic.      Bonny returns to followup her aplastic anemia.  She is now 3-1/2 months since her treatment with ATG, cyclosporine and  steroids.  She is just completing the taper of steroids and is continuing on therapy with cyclosporine now 100 mg twice daily and eltrombopag 150 mg daily.      She has had a slow incremental response with a neutrophil count now above 1000 and a platelet count between 20,000 and 30,000, though it is uncertain if she is possibly transfusion independent at this point.  She has needed red cells still periodically but less frequently.      In the last month or two, she has had no fevers, chills, rash, bleeding or bruising.  She has had some dysphagia and sometimes feeling like she is choking.  A swallow study several days ago and video esophagram today showed no specific lesions and her discomfort may be due to esophageal reflux.  Her Protonix has been increased to twice daily and today I suggested she initiate Carafate slurry as well.  Her other complaint besides fatigue and general lack of energy is soreness on her left chest, somewhat in a dermatomal-like stripe around her L mid thorax at the lower end of her bra.  There are some focal spots of tenderness and some small hyperpigmented areas, but she has not been aware of any previous rash and certainly no vesicles.  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  The pain is not pleuritic or sore when she twists, but when she sits up or lies down and some specific focal areas on her ribs are tender but pushing on the same rib away from that area does not re-elicit the pain suggesting it is not a rib-specific injury.  There are no other areas of bone tenderness.  The rest of review of systems is unrevealing.        PHYSICAL EXAMINATION:   VITAL SIGNS:  Her exam shows her weight stable over the last 2 months but the recent dysphagia led to a few pounds of weight loss.  Her blood pressure was elevated on arrival  today, but her last few blood pressures have not been  high, though that has been monitored closely with her cyclosporine therapy.     GENERAL:  She was alert, comfortable and in no distress.     EXTREMITIES:  She had no lower extremity edema.     SKIN:  No petechiae.  No inflammatory skin rash.   HEENT:  There are no oral lesions.     MUSCULOSKELETAL:  She had no focal bone tenderness except the lateral mid portion of her mid thoracic rib on the left side.   LUNGS:  Clear without rales or wheezes.   CARDIOVASCULAR:  Her heart tones are regular without gallop or murmur.   ABDOMEN:  No carotid or abdominal  bruit.  No palpable masses or hepatosplenomegaly.        LABORATORY:  Laboratory testing showed acceptable electrolytes and recent testing showed normal liver function.  Her blood counts remained low and she has not had a retic count done in a while, but her neutrophil count is now 1500 without growth factor support.  Cyclosporine level today was 184.      ASSESSMENT/PLAN:  She has had a slow and demonstrable partial response to her immunosuppressive therapy and she will now discontinue low-dose prednisone (she has been on 5 mg every other day for the last 2 weeks) and will continue the cyclosporine and eltrombopag only along with additional supportive care.  As noted above, we will add Carafate to her GI regimen and continue to watch her closely.      I reviewed again the general plan of her treatment that her immunosuppression with cyclosporine will be to some extent indefinite because with her slow but progressive response in her neutrophil production and possible increase in her platelet and red cell production (we will measure a retic count at next visit) we will expect to potentially see further improvement over time.      Her questions were answered in full, and I told her she should continue the weekly blood checks and transfusions as needed through [facility] and we will see her here at the [facility] once monthly, but she knows to call if additional issues  arise.      NOTE:  Though she has had many platelet transfusions, last week she had a first itching reaction soon after 1 platelet transfusion.  We will add oral Benadryl to the premedication, but if the symptomatology does not recur, we may discontinue that so she does not have the dry mouth and sleepiness associated with Benadryl therapy every time.      Overall she is doing well and the encouraging partial response over the last period of time is hopeful.      Rafita Rogel MD   Professor of Medicine              D: 2017 21:22   T: 2017 08:39   MT: [email or web address]      Name:     LISSETH PARIKH   MRN:      [id number]        Account:      [id number]   :      1945           Service Date: 2017      Document: [id number]

## 2017-05-05 NOTE — PROGRESS NOTES
HISTORY OF PRESENT ILLNESS:  The patient is seen in the outpatient oncology clinic.      Bonny returns to followup her aplastic anemia.  She is now 3-1/2 months since her treatment with ATG, cyclosporine and steroids.  She is just completing the taper of steroids and is continuing on therapy with cyclosporine now 100 mg twice daily and eltrombopag 150 mg daily.      She has had a slow incremental response with a neutrophil count now above 1000 and a platelet count between 20,000 and 30,000, though it is uncertain if she is possibly transfusion independent at this point.  She has needed red cells still periodically but less frequently.      In the last month or two, she has had no fevers, chills, rash, bleeding or bruising.  She has had some dysphagia and sometimes feeling like she is choking.  A swallow study several days ago and video esophagram today showed no specific lesions and her discomfort may be due to esophageal reflux.  Her Protonix has been increased to twice daily and today I suggested she initiate Carafate slurry as well.  Her other complaint besides fatigue and general lack of energy is soreness on her left chest, somewhat in a dermatomal-like stripe around her L mid thorax at the lower end of her bra.  There are some focal spots of tenderness and some small hyperpigmented areas, but she has not been aware of any previous rash and certainly no vesicles.  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  The pain is not pleuritic or sore when she twists, but when she sits up or lies down and some specific focal areas on her ribs are tender but pushing on the same rib away from that area does not re-elicit the pain suggesting it is not a rib-specific injury.  There are no other areas of bone tenderness.  The rest of review of systems is unrevealing.        PHYSICAL EXAMINATION:   VITAL SIGNS:  Her exam  shows her weight stable over the last 2 months but the recent dysphagia led to a few pounds of weight loss.  Her blood pressure was elevated on arrival  today, but her last few blood pressures have not been high, though that has been monitored closely with her cyclosporine therapy.     GENERAL:  She was alert, comfortable and in no distress.     EXTREMITIES:  She had no lower extremity edema.     SKIN:  No petechiae.  No inflammatory skin rash.   HEENT:  There are no oral lesions.     MUSCULOSKELETAL:  She had no focal bone tenderness except the lateral mid portion of her mid thoracic rib on the left side.   LUNGS:  Clear without rales or wheezes.   CARDIOVASCULAR:  Her heart tones are regular without gallop or murmur.   ABDOMEN:  No carotid or abdominal  bruit.  No palpable masses or hepatosplenomegaly.        LABORATORY:  Laboratory testing showed acceptable electrolytes and recent testing showed normal liver function.  Her blood counts remained low and she has not had a retic count done in a while, but her neutrophil count is now 1500 without growth factor support.  Cyclosporine level today was 184.      ASSESSMENT/PLAN:  She has had a slow and demonstrable partial response to her immunosuppressive therapy and she will now discontinue low-dose prednisone (she has been on 5 mg every other day for the last 2 weeks) and will continue the cyclosporine and eltrombopag only along with additional supportive care.  As noted above, we will add Carafate to her GI regimen and continue to watch her closely.      I reviewed again the general plan of her treatment that her immunosuppression with cyclosporine will be to some extent indefinite because with her slow but progressive response in her neutrophil production and possible increase in her platelet and red cell production (we will measure a retic count at next visit) we will expect to potentially see further improvement over time.      Her questions were answered in full,  and I told her she should continue the weekly blood checks and transfusions as needed through [facility] and we will see her here at the [place] once monthly, but she knows to call if additional issues arise.      NOTE:  Though she has had many platelet transfusions, last week she had a first itching reaction soon after 1 platelet transfusion.  We will add oral Benadryl to the premedication, but if the symptomatology does not recur, we may discontinue that so she does not have the dry mouth and sleepiness associated with Benadryl therapy every time.      Overall she is doing well and the encouraging partial response over the last period of time is hopeful.      Ryann Gilbert MD   Professor of Medicine         RYANN GILBERT MD             D: 2017 21:22   T: 2017 08:39   MT: [email or web address]      Name:     LISSETH PARIKH   MRN:      [id number]        Account:      [id number]   :      1945           Service Date: 2017      Document: [id number]

## 2017-05-05 NOTE — PROGRESS NOTES
"BP (!) 167/97 (BP Location: Left arm, Patient Position: Chair, Cuff Size: Adult Regular)  Pulse 101  Temp 97.4  F (36.3  C) (Oral)  Resp 18  Ht 1.6 m (5' 2.99\")  Wt 58.2 kg (128 lb 4.9 oz)  SpO2 95%  BMI 22.73 kg/m2  Wt Readings from Last 4 Encounters:   05/04/17 58.2 kg (128 lb 4.9 oz)   04/24/17 58.7 kg (129 lb 4.8 oz)   04/04/17 59.5 kg (131 lb 1.6 oz)   03/09/17 59.3 kg (130 lb 11.7 oz)     HISTORY OF PRESENT ILLNESS: The patient is seen in the outpatient oncology clinic.       Bonny returns to followup her aplastic anemia. She is now 3-1/2 months since her treatment with ATG, cyclosporine and steroids. She is just completing the taper of steroids and is continuing on therapy with cyclosporine now 100 mg twice daily and eltrombopag 150 mg daily.       She has had a slow incremental response with a neutrophil count now above 1000 and a platelet count between 20,000 and 30,000, though it is uncertain if she is possibly transfusion independent at this point. She has needed red cells still periodically but less frequently.       In the last month or two, she has had no fevers, chills, rash, bleeding or bruising. She has had some dysphagia and sometimes feeling like she is choking. A swallow study several days ago and video esophagram today showed no specific lesions (mild esophageal dysmotility) and her discomfort may be due to esophageal reflux. Her Protonix has been increased to twice daily and today I suggested she initiate Carafate slurry as well. Her other complaint besides fatigue and general lack of energy is soreness on her left chest, somewhat in a dermatomal-like stripe around her L mid thorax at the lower end of her bra. There are some focal spots of tenderness and some small hyperpigmented areas, but she has not been aware of any previous rash and certainly no vesicles. 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 The pain is not pleuritic or sore when she twists, but when she sits up or lies down and some specific focal areas on her ribs are tender but pushing on the same rib away from that area does not re-elicit the pain suggesting it is not a rib-specific injury. There are no other areas of bone tenderness. The rest of review of systems is unrevealing.       PHYSICAL EXAMINATION:   VITAL SIGNS: Her exam shows her weight stable over the last 2 months but the recent dysphagia led to a few pounds of weight loss. Her blood pressure was elevated on arrival  today, but her last few blood pressures have not been high, though that has been monitored closely with her cyclosporine therapy.   GENERAL: She was alert, comfortable and in no distress.   EXTREMITIES: She had no lower extremity edema.   SKIN: No petechiae. No inflammatory skin rash.   HEENT: There are no oral lesions.   MUSCULOSKELETAL: She had no focal bone tenderness except the lateral mid portion of her mid thoracic rib on the left side.   LUNGS: Clear without rales or wheezes.   CARDIOVASCULAR: Her heart tones are regular without gallop or murmur.   ABDOMEN: No carotid or abdominal bruit. No palpable masses or hepatosplenomegaly.       LABORATORY: Laboratory testing showed acceptable electrolytes and recent testing showed normal liver function. Her blood counts remained low and she has not had a retic count done in a while, but her neutrophil count is now 1500 without growth factor support. Cyclosporine level today was 184.       ASSESSMENT/PLAN: She has had a slow and demonstrable partial response to her immunosuppressive therapy and she will now discontinue low-dose prednisone (she has been on 5 mg every other day for the last 2 weeks) and will continue the cyclosporine and eltrombopag only along with additional supportive care. As noted above, we will add Carafate to her GI regimen and continue to watch her closely.       I reviewed again the  general plan of her treatment that her immunosuppression with cyclosporine will be to some extent indefinite because with her slow but progressive response in her neutrophil production and possible increase in her platelet and red cell production (we will measure a retic count at next visit) we will expect to potentially see further improvement over time.       Her questions were answered in full, and I told her she should continue the weekly blood checks and transfusions as needed through [facility] and we will see her here at the [facility] once monthly, but she knows to call if additional issues arise.       NOTE: Though she has had many platelet transfusions, last week she had a first itching reaction soon after 1 platelet transfusion. We will add oral Benadryl to the premedication, but if the symptomatology does not recur, we may discontinue that so she does not have the dry mouth and sleepiness associated with Benadryl therapy every time.       Overall she is doing well and the encouraging partial response over the last period of time is hopeful.       Rafita Rogel MD   Professor of Medicine       Results for LISSETH PARIKH (MRN [id number]) as of 5/5/2017 09:12   Ref. Range 5/3/2017 08:44 5/4/2017 13:49   Sodium Latest Ref Range: 133 - 144 mmol/L 142    Potassium Latest Ref Range: 3.4 - 5.3 mmol/L 3.7    Chloride Latest Ref Range: 94 - 109 mmol/L 108    Carbon Dioxide Latest Ref Range: 20 - 32 mmol/L 23    Urea Nitrogen Latest Ref Range: 7 - 30 mg/dL 32 (H)    Creatinine Latest Ref Range: 0.52 - 1.04 mg/dL 1.06 (H)    GFR Estimate Latest Ref Range: >60 mL/min/1.7m2 51 (L)    GFR Estimate If Black Latest Ref Range: >60 mL/min/1.7m2 61    Calcium Latest Ref Range: 8.5 - 10.1 mg/dL 8.7    Anion Gap Latest Ref Range: 3 - 14 mmol/L 11    Albumin Latest Ref Range: 3.4 - 5.0 g/dL 3.3 (L)    Protein Total Latest Ref Range: 6.8 - 8.8 g/dL 7.1    Bilirubin Total Latest Ref Range: 0.2 - 1.3 mg/dL 2.2 (H)     Alkaline Phosphatase Latest Ref Range: 40 - 150 U/L 73    ALT Latest Ref Range: 0 - 50 U/L 22    AST Latest Ref Range: 0 - 45 U/L 13    Glucose Latest Ref Range: 70 - 99 mg/dL 120 (H)    WBC Latest Ref Range: 4.0 - 11.0 10e9/L 3.8 (L)    Hemoglobin Latest Ref Range: 11.7 - 15.7 g/dL 8.7 (L)    Hematocrit Latest Ref Range: 35.0 - 47.0 % 25.4 (L)    Platelet Count Latest Ref Range: 150 - 450 10e9/L 50 (L)    RBC Count Latest Ref Range: 3.8 - 5.2 10e12/L 2.36 (L)    MCV Latest Ref Range: 78 - 100 fl 108 (H)    MCH Latest Ref Range: 26.5 - 33.0 pg 36.9 (H)    MCHC Latest Ref Range: 31.5 - 36.5 g/dL 34.3    RDW Latest Ref Range: 10.0 - 15.0 % Dimorphic populat...    Diff Method Unknown Automated Method    % Neutrophils Latest Units: % 39.4    % Lymphocytes Latest Units: % 50.1    % Monocytes Latest Units: % 9.7    % Eosinophils Latest Units: % 0.5    % Basophils Latest Units: % 0.0    % Immature Granulocytes Latest Units: % 0.3    Nucleated RBCs Latest Ref Range: 0 /100 1 (H)    Absolute Neutrophil Latest Ref Range: 1.6 - 8.3 10e9/L 1.5 (L)    Absolute Lymphocytes Latest Ref Range: 0.8 - 5.3 10e9/L 1.9    Absolute Monocytes Latest Ref Range: 0.0 - 1.3 10e9/L 0.4    Absolute Eosinophils Latest Ref Range: 0.0 - 0.7 10e9/L 0.0    Absolute Basophils Latest Ref Range: 0.0 - 0.2 10e9/L 0.0    Abs Immature Granulocytes Latest Ref Range: 0 - 0.4 10e9/L 0.0    Absolute Nucleated RBC Unknown 0.0    Poikilocytosis Unknown Marked    Microcytes Unknown Present    Platelet Estimate Unknown Confirming automa...    Cyclosporine Last Dose Unknown NOT PROVIDED    Cyclosporine Level Latest Ref Range: 50 - 400 ug/L 184    XR ESOPHAGRAM Unknown  Rpt

## 2017-05-08 ENCOUNTER — OFFICE VISIT (OUTPATIENT)
Dept: FAMILY MEDICINE | Facility: CLINIC | Age: 72
End: 2017-05-08
Payer: COMMERCIAL

## 2017-05-08 ENCOUNTER — RADIANT APPOINTMENT (OUTPATIENT)
Dept: GENERAL RADIOLOGY | Facility: CLINIC | Age: 72
End: 2017-05-08
Attending: PHYSICIAN ASSISTANT
Payer: COMMERCIAL

## 2017-05-08 VITALS
WEIGHT: 128.6 LBS | DIASTOLIC BLOOD PRESSURE: 79 MMHG | SYSTOLIC BLOOD PRESSURE: 116 MMHG | HEIGHT: 63 IN | BODY MASS INDEX: 22.79 KG/M2 | TEMPERATURE: 97.1 F | HEART RATE: 108 BPM | OXYGEN SATURATION: 94 %

## 2017-05-08 DIAGNOSIS — Z86.711 HISTORY OF PULMONARY EMBOLISM: ICD-10-CM

## 2017-05-08 DIAGNOSIS — R07.9 LEFT SIDED CHEST PAIN: Primary | ICD-10-CM

## 2017-05-08 DIAGNOSIS — D61.3 IDIOPATHIC APLASTIC ANEMIA (H): ICD-10-CM

## 2017-05-08 DIAGNOSIS — Z87.898 HISTORY OF SOLITARY PULMONARY NODULE: ICD-10-CM

## 2017-05-08 DIAGNOSIS — R07.9 LEFT SIDED CHEST PAIN: ICD-10-CM

## 2017-05-08 PROCEDURE — 93000 ELECTROCARDIOGRAM COMPLETE: CPT | Performed by: PHYSICIAN ASSISTANT

## 2017-05-08 PROCEDURE — 71020 XR CHEST 2 VW: CPT

## 2017-05-08 PROCEDURE — 99215 OFFICE O/P EST HI 40 MIN: CPT | Performed by: PHYSICIAN ASSISTANT

## 2017-05-08 RX ORDER — DIPHENHYDRAMINE HCL 25 MG
25 CAPSULE ORAL SEE ADMIN INSTRUCTIONS
Status: CANCELLED
Start: 2017-05-08

## 2017-05-08 NOTE — PROGRESS NOTES
HPI: 72 yo female with aplastic anemia here with son Shakeel with complaint of L sided chest pain  She is a difficult historian and not sure when this pain came on  She did talk to her oncologist about this last week according to note in epic but doesn't recall that.  States the pain L chest wall pain that comes and goes  Noticed this yesterday in the afternoon but after she walked around the pain resolved, but came back when lying in bed last night. Still had the pain this morning, but currently is a little better  This is in the upper chest  She had some pain in her R upper back as well.  She also has acid reflux and is taking protonix (she thinks)  Today the pain is pleuritic but this wasn't last week.  Denies any burning  She has some belching off and on which is typical of her when she has reflux  She vomiting 10 days ago but has some dry heaves today  She has had diarrhea x 5d but was constipated before that  She is having 3 small BMs per day, but occas more.  That is making her feel weak and her appetite is poor.  She was able to eat quiche and drink V8 today.  She does take Tylenol but hasn't noticed this helps.      Past Medical History:   Diagnosis Date     Allergic rhinitis due to other allergen      Closed anterior dislocation of humerus      DVT of lower extremity (deep venous thrombosis) (H) 10-12    Left after prolonged sitting-plane     DVT, recurrent, lower extremity, acute (H) 2014     Headache(784.0)      Osteoporosis, unspecified      Pulmonary emboli (H) 10-12     Sensorineural hearing loss, unspecified      Sprain of ankle, unspecified site     L     Past Surgical History:   Procedure Laterality Date     ARTHRODESIS FOOT  7-18-11    Hallux valgus R-1st MP joint     BLEPHAROPLASTY BILATERAL  2012, 2014     BONE MARROW BIOPSY, BONE SPECIMEN, NEEDLE/TROCAR N/A 9/26/2016    Procedure: BIOPSY BONE MARROW;  Surgeon: Mu Morgan MD;  Location:  GI     BONE MARROW BIOPSY, BONE SPECIMEN,  NEEDLE/TROCAR N/A 2016    Procedure: BIOPSY BONE MARROW;  Surgeon: Mu Morgan MD;  Location:  GI     C DEXA INTERPRETATION, AXIAL  03     C NONSPECIFIC PROCEDURE           C NONSPECIFIC PROCEDURE      hysterectomy/BSO     C NONSPECIFIC PROCEDURE      myomectomy (fibroids)     CATARACT IOL, RT/LT Right      CATARACT IOL, RT/LT Left      EXCHANGE INTRAOCULAR LENS IMPLANT Right 2015    Procedure: EXCHANGE INTRAOCULAR LENS IMPLANT;  Surgeon: Garrett Dawson MD;  Location:  [facility]     HC COLONOSCOPY THRU STOMA, DIAGNOSTIC      normal- minimal diverticulosis     PICC INSERTION Left 2017    5fr DL BioFlo PICC, 42cm (2cm external) in the L basilic vein w/ tip in the  SVC RA junction.     VITRECTOMY PARSPLANA WITH 23 GAUGE SYSTEM Right 2015    Procedure: VITRECTOMY PARSPLANA WITH 23 GAUGE SYSTEM;  Surgeon: Racheal Loyd MD;  Location: [facility]     Social History   Substance Use Topics     Smoking status: Former Smoker     Quit date: 1973     Smokeless tobacco: Never Used     Alcohol use No      Comment: occasionally     Current Outpatient Prescriptions   Medication Sig Dispense Refill     pantoprazole (PROTONIX) 40 MG EC tablet Take 1 tablet (40 mg) by mouth 2 times daily 60 tablet 3     senna-docusate (SENNA S) 8.6-50 MG per tablet Can take 1-2 tablets every other day 100 tablet 1     cycloSPORINE modified (GENERIC EQUIVALENT) 100 MG capsule Take 1 capsule (100 mg) by mouth 2 times daily Reported on 2017 120 capsule      polyethylene glycol (MIRALAX/GLYCOLAX) Packet Take 17 g by mouth daily as needed for constipation . If you start to have loose stools/diarrhea or multiple bowel movements, ok to hold this medication. Then resume if no bowel movement after 24-48hours. 14 packet 0     sucralfate (CARAFATE) 1 GM/10ML suspension Take 10 mLs (1 g) by mouth 2 times daily as needed 1200 mL 3     pseudoePHEDrine (SUDAFED) 120 MG 12 hr tablet Take 120  "mg by mouth every 12 hours       ondansetron (ZOFRAN-ODT) 4 MG ODT tab Take 1 tablet (4 mg) by mouth every 6 hours as needed for nausea 20 tablet 1     oxyCODONE (ROXICODONE) 5 MG IR tablet Take 1 tablet (5 mg) by mouth every 6 hours as needed for moderate to severe pain 50 tablet 0     cycloSPORINE modified (GENERIC EQUIVALENT) 25 MG capsule Take 75 mg by mouth 2 times daily  300 capsule 3     eltrombopag (PROMACTA) 50 MG tablet Take 3 tablets (150 mg) by mouth daily Administer on an empty stomach, 1 hour before or 2 hours after a meal. Or as directed 90 tablet 6     diclofenac (VOLTAREN) 1 % GEL topical gel Apply 2 g topically 2 times daily 100 g 1     diphenhydrAMINE (BENADRYL ALLERGY) 25 MG tablet Take 1 tablet (25 mg) by mouth nightly as needed 56 tablet      COMPRESSION STOCKINGS Wear compression stockings at 20-30 mmHg rating most time during the day to the affected leg (left leg) or both legs. Take them off at night. 2 each 2     Acetaminophen (TYLENOL PO) Take 325 mg by mouth every 6 hours as needed for mild pain or fever       ORDER FOR DME Equipment being ordered: knee high compression stockings- 18-20 mm 1 Box 1     loratadine (CLARITIN) 10 MG tablet Take 10 mg by mouth daily as needed Reported on 4/26/2017       chlorpheniramine (CHLOR-TRIMETON) 4 MG tablet Take 4 mg by mouth every 6 hours as needed. For head cold        Allergies   Allergen Reactions     Cats      Dogs      No Known Drug Allergies      Seasonal Allergies      FAMILY HISTORY NOTED AND REVIEWED    PHYSICAL EXAM:    /79 (BP Location: Right arm, Patient Position: Chair, Cuff Size: Adult Regular)  Pulse 108  Temp 97.1  F (36.2  C) (Tympanic)  Ht 5' 2.99\" (1.6 m)  Wt 128 lb 9.6 oz (58.3 kg)  SpO2 94%  Breastfeeding? No  BMI 22.79 kg/m2    Patient appears pale  Lungs: CTA Bilat  Chest wall: no lesions, +chest wall tenderness L ant and post lower ribcage  No eccymoses  Heart: RRR without murmur    CXR: \"nothing acute\"  EKG: " NSR    Assessment and Plan:     (R07.9) Left sided chest pain  (primary encounter diagnosis)  Comment: ? Costochondritis? She also has gerd and has rx for protonix and carafate waiting for her at the pharmacy that she should get started.  F/u if pain worsens or persists.    Plan: EKG 12-lead complete w/read - Clinics, XR Chest        2 Views            (D61.3) Idiopathic aplastic anemia (H)  Comment:   Plan: followed by hematology    (Z87.89) History of solitary pulmonary nodule  Comment:   Plan: CT Chest w/o Contrast            (Z86.711) History of pulmonary embolism  Comment:   Plan: doubt PE cause of her pain as she is tender to palp    Spent 45 minutes FTF with patient and her son of which over 50% was spent discussing the coordination of care and management of their issues noted.        Shivani Phelps PA-C

## 2017-05-08 NOTE — NURSING NOTE
"Chief Complaint   Patient presents with     Breast Pain     left side pain started yesterday       Initial /79 (BP Location: Right arm, Patient Position: Chair, Cuff Size: Adult Regular)  Pulse 108  Temp 97.1  F (36.2  C) (Tympanic)  Ht 5' 2.99\" (1.6 m)  Wt 128 lb 9.6 oz (58.3 kg)  SpO2 94%  Breastfeeding? No  BMI 22.79 kg/m2 Estimated body mass index is 22.79 kg/(m^2) as calculated from the following:    Height as of this encounter: 5' 2.99\" (1.6 m).    Weight as of this encounter: 128 lb 9.6 oz (58.3 kg).  Medication Reconciliation: katie Antony      "

## 2017-05-08 NOTE — MR AVS SNAPSHOT
After Visit Summary   5/8/2017    Bonny Raymundo    MRN: [id number]           Patient Information     Date Of Birth          1945        Visit Information        Provider Department      5/8/2017 1:30 PM Shivani Phelps PA-C [facility]        Today's Diagnoses     Left sided chest pain    -  1    Idiopathic aplastic anemia (H)        History of solitary pulmonary nodule        History of pulmonary embolism           Follow-ups after your visit        Your next 10 appointments already scheduled     May 10, 2017  8:30 AM CDT   Level 5 with SH INFUSION CHAIR 13   [facility] ([facility])    [facility]  [address]  [place] [address]   [phone]            May 17, 2017  9:00 AM CDT   Level 5 with SH INFUSION CHAIR 18   [facility] ([facility])    [facility]  [address]  [place] [address]   [phone]            May 24, 2017  8:30 AM CDT   Level 5 with SH INFUSION CHAIR 3   [facility] ([facility])    [facility]  [address]  [place] [address]   [phone]            May 31, 2017  8:00 AM CDT   Level 5 with SH INFUSION CHAIR 14   [facility] ([facility])    [facility]  [address]  [place] [address]   [phone]            Jun 01, 2017  4:00 PM CDT   RETURN ONC with Rafita Rogel MD   [facility] ([facility])    [address] [id number] [phone]            Jun 07, 2017  8:30 AM CDT   Level 5 with SH INFUSION CHAIR 18   [facility] ([facility])    [facility]  [address]  [place] [address]   [phone]              Future tests that were ordered for you today     Open Future Orders        Priority Expected Expires Ordered    CT Chest w/o Contrast Routine 2017            Who to contact     If you have questions or need follow up information about today's clinic visit or your schedule please contact [facility] directly at [phone].  Normal or non-critical lab and imaging results will be communicated to you by [employer], letter or phone within 4 business days after the clinic has received the results. If you do not hear from us within 7 days, please contact the clinic through [employer] or phone. If you have a critical or abnormal lab result, we will notify you by phone as soon as possible.  Submit refill requests through [employer] or call your pharmacy and they will forward the refill request to us. Please allow 3 business days for your refill to be completed.          Additional Information About Your Visit        [employer] Information     [employer] lets you send messages to your doctor, view your test results, renew your prescriptions, schedule appointments and more. To sign up, go to [email or web address] . Click on \"Log in\" on the left side of the screen, which will take you to the Welcome page. Then click on \"Sign up Now\" on the right side of the page.     You will be asked to enter the access code listed below, as well as some personal information. Please follow the directions to create your username and password.     Your access code is: [email or web address]  Expires: 2017  1:59 PM     Your access code will  in 90 days. If you need help or a new code, please call your [facility] or [phone].        Care EveryWhere ID     This is your Care EveryWhere ID. This could be used by other organizations to access your [place] medical records  [id number]        Your Vitals Were     Pulse Temperature Height Pulse Oximetry " "Breastfeeding? BMI (Body Mass Index)    108 97.1  F (36.2  C) (Tympanic) 5' 2.99\" (1.6 m) 94% No 22.79 kg/m2       Blood Pressure from Last 3 Encounters:   05/08/17 116/79   05/04/17 (!) 167/97   05/03/17 126/80    Weight from Last 3 Encounters:   05/08/17 128 lb 9.6 oz (58.3 kg)   05/04/17 128 lb 4.9 oz (58.2 kg)   04/24/17 129 lb 4.8 oz (58.7 kg)              We Performed the Following     EKG 12-lead complete w/read - Clinics        Primary Care Provider Office Phone # Fax #    Rafita Rogel [phone]        [facility] [address]  [facility] [address]        Thank you!     Thank you for choosing [facility]  for your care. Our goal is always to provide you with excellent care. Hearing back from our patients is one way we can continue to improve our services. Please take a few minutes to complete the written survey that you may receive in the mail after your visit with us. Thank you!             Your Updated Medication List - Protect others around you: Learn how to safely use, store and throw away your medicines at www.disposemymeds.org.          This list is accurate as of: 5/8/17  2:53 PM.  Always use your most recent med list.                   Brand Name Dispense Instructions for use    BENADRYL ALLERGY 25 MG tablet   Generic drug:  diphenhydrAMINE     56 tablet    Take 1 tablet (25 mg) by mouth nightly as needed       chlorpheniramine 4 MG tablet    CHLOR-TRIMETON     Take 4 mg by mouth every 6 hours as needed. For head cold       COMPRESSION STOCKINGS     2 each    Wear compression stockings at 20-30 mmHg rating most time during the day to the affected leg (left leg) or both legs. Take them off at night.       * cycloSPORINE modified 25 MG capsule    GENERIC EQUIVALENT    300 capsule    Take 75 mg by mouth 2 times daily       * cycloSPORINE modified 100 MG capsule    GENERIC EQUIVALENT    120 capsule    Take 1 capsule (100 mg) by mouth 2 times daily " Reported on 5/4/2017       diclofenac 1 % Gel topical gel    VOLTAREN    100 g    Apply 2 g topically 2 times daily       eltrombopag 50 MG tablet    PROMACTA    90 tablet    Take 3 tablets (150 mg) by mouth daily Administer on an empty stomach, 1 hour before or 2 hours after a meal. Or as directed       loratadine 10 MG tablet    CLARITIN     Take 10 mg by mouth daily as needed Reported on 4/26/2017       ondansetron 4 MG ODT tab    ZOFRAN-ODT    20 tablet    Take 1 tablet (4 mg) by mouth every 6 hours as needed for nausea       order for DME     1 Box    Equipment being ordered: knee high compression stockings- 18-20 mm       oxyCODONE 5 MG IR tablet    ROXICODONE    50 tablet    Take 1 tablet (5 mg) by mouth every 6 hours as needed for moderate to severe pain       pantoprazole 40 MG EC tablet    PROTONIX    60 tablet    Take 1 tablet (40 mg) by mouth 2 times daily       polyethylene glycol Packet    MIRALAX/GLYCOLAX    14 packet    Take 17 g by mouth daily as needed for constipation . If you start to have loose stools/diarrhea or multiple bowel movements, ok to hold this medication. Then resume if no bowel movement after 24-48hours.       pseudoePHEDrine 120 MG 12 hr tablet    SUDAFED     Take 120 mg by mouth every 12 hours       SENNA S 8.6-50 MG per tablet   Generic drug:  senna-docusate     100 tablet    Can take 1-2 tablets every other day       sucralfate 1 GM/10ML suspension    CARAFATE    1200 mL    Take 10 mLs (1 g) by mouth 2 times daily as needed       TYLENOL PO      Take 325 mg by mouth every 6 hours as needed for mild pain or fever       * Notice:  This list has 2 medication(s) that are the same as other medications prescribed for you. Read the directions carefully, and ask your doctor or other care provider to review them with you.

## 2017-05-10 ENCOUNTER — INFUSION THERAPY VISIT (OUTPATIENT)
Dept: INFUSION THERAPY | Facility: CLINIC | Age: 72
End: 2017-05-10
Attending: INTERNAL MEDICINE
Payer: COMMERCIAL

## 2017-05-10 ENCOUNTER — HOSPITAL ENCOUNTER (OUTPATIENT)
Facility: CLINIC | Age: 72
Setting detail: SPECIMEN
Discharge: HOME | End: 2017-05-10
Attending: MIDWIFE | Admitting: PHYSICIAN ASSISTANT
Payer: COMMERCIAL

## 2017-05-10 DIAGNOSIS — D61.3 IDIOPATHIC APLASTIC ANEMIA (H): ICD-10-CM

## 2017-05-10 DIAGNOSIS — D61.818 PANCYTOPENIA (H): ICD-10-CM

## 2017-05-10 LAB
ALBUMIN SERPL-MCNC: 3.2 G/DL (ref 3.4–5)
ALP SERPL-CCNC: 77 U/L (ref 40–150)
ALT SERPL W P-5'-P-CCNC: 19 U/L (ref 0–50)
ANION GAP SERPL CALCULATED.3IONS-SCNC: 11 MMOL/L (ref 3–14)
AST SERPL W P-5'-P-CCNC: 13 U/L (ref 0–45)
BASOPHILS # BLD AUTO: 0 10E9/L (ref 0–0.2)
BASOPHILS NFR BLD AUTO: 0.2 %
BILIRUB SERPL-MCNC: 1.9 MG/DL (ref 0.2–1.3)
BUN SERPL-MCNC: 31 MG/DL (ref 7–30)
CALCIUM SERPL-MCNC: 8.8 MG/DL (ref 8.5–10.1)
CHLORIDE SERPL-SCNC: 107 MMOL/L (ref 94–109)
CO2 SERPL-SCNC: 23 MMOL/L (ref 20–32)
CREAT SERPL-MCNC: 1.07 MG/DL (ref 0.52–1.04)
CYCLOSPORINE BLD LC/MS/MS-MCNC: 140 UG/L (ref 50–400)
DIFFERENTIAL METHOD BLD: ABNORMAL
EOSINOPHIL # BLD AUTO: 0 10E9/L (ref 0–0.7)
EOSINOPHIL NFR BLD AUTO: 0.7 %
ERYTHROCYTE [DISTWIDTH] IN BLOOD BY AUTOMATED COUNT: ABNORMAL % (ref 10–15)
GFR SERPL CREATININE-BSD FRML MDRD: 50 ML/MIN/1.7M2
GLUCOSE SERPL-MCNC: 125 MG/DL (ref 70–99)
HCT VFR BLD AUTO: 24.3 % (ref 35–47)
HGB BLD-MCNC: 8.2 G/DL (ref 11.7–15.7)
IMM GRANULOCYTES # BLD: 0 10E9/L (ref 0–0.4)
IMM GRANULOCYTES NFR BLD: 0 %
LYMPHOCYTES # BLD AUTO: 1.9 10E9/L (ref 0.8–5.3)
LYMPHOCYTES NFR BLD AUTO: 45.5 %
MCH RBC QN AUTO: 37.1 PG (ref 26.5–33)
MCHC RBC AUTO-ENTMCNC: 33.7 G/DL (ref 31.5–36.5)
MCV RBC AUTO: 110 FL (ref 78–100)
MONOCYTES # BLD AUTO: 0.3 10E9/L (ref 0–1.3)
MONOCYTES NFR BLD AUTO: 7 %
NEUTROPHILS # BLD AUTO: 1.9 10E9/L (ref 1.6–8.3)
NEUTROPHILS NFR BLD AUTO: 46.6 %
NRBC # BLD AUTO: 0 10*3/UL
NRBC BLD AUTO-RTO: 1 /100
PLATELET # BLD AUTO: 31 10E9/L (ref 150–450)
POTASSIUM SERPL-SCNC: 3.9 MMOL/L (ref 3.4–5.3)
PROT SERPL-MCNC: 7.1 G/DL (ref 6.8–8.8)
RBC # BLD AUTO: 2.21 10E12/L (ref 3.8–5.2)
SODIUM SERPL-SCNC: 141 MMOL/L (ref 133–144)
TME LAST DOSE: NORMAL H
WBC # BLD AUTO: 4.2 10E9/L (ref 4–11)

## 2017-05-10 PROCEDURE — 36592 COLLECT BLOOD FROM PICC: CPT

## 2017-05-10 PROCEDURE — 80158 DRUG ASSAY CYCLOSPORINE: CPT | Performed by: PHYSICIAN ASSISTANT

## 2017-05-10 PROCEDURE — 80053 COMPREHEN METABOLIC PANEL: CPT | Performed by: PHYSICIAN ASSISTANT

## 2017-05-10 PROCEDURE — 85025 COMPLETE CBC W/AUTO DIFF WBC: CPT | Performed by: PHYSICIAN ASSISTANT

## 2017-05-10 NOTE — PROGRESS NOTES
Infusion Nursing Note:  Bonny Raymundo presents today for transfusion and labs.    Patient seen by provider today: No   present during visit today: Not Applicable.    Note: N/A.    Intravenous Access:  PICC.    Treatment Conditions:  Lab Results   Component Value Date    HGB 8.2 05/10/2017     Lab Results   Component Value Date    WBC 4.2 05/10/2017      Lab Results   Component Value Date    ANEU 1.9 05/10/2017     Lab Results   Component Value Date    PLT 31 05/10/2017      Results reviewed, labs did NOT meet treatment parameters.      Post Infusion Assessment:  Patient tolerated dressing change and lab draw without incident.  Site patent and intact, free from redness, edema or discomfort.  No evidence of extravasations.    Discharge Plan:   Patient and/or family verbalized understanding of discharge instructions and all questions answered.  Copy of AVS reviewed with patient and/or family.  Patient will return next Wednesday   for next appointment.  Patient discharged in stable condition accompanied by: self.  Departure Mode: Ambulatory.    Laisha Jeffery RN

## 2017-05-10 NOTE — MR AVS SNAPSHOT
After Visit Summary   5/10/2017    Bonny Raymundo    MRN: [id number]           Patient Information     Date Of Birth          1945        Visit Information        Provider Department      5/10/2017 8:30 AM  INFUSION CHAIR 13 [facility]fusion Center        Today's Diagnoses     Idiopathic aplastic anemia (H)        Pancytopenia (H)           Follow-ups after your visit        Your next 10 appointments already scheduled     May 11, 2017 11:00 AM CDT   CT CHEST W/O CONTRAST with SHCT1   [facility] CT ([facility])    [address] [address]   [phone]           Please bring any scans or X-rays taken at other hospitals, if similar tests were done. Also bring a list of your medicines, including vitamins, minerals and over-the-counter drugs. It is safest to leave personal items at home.  Be sure to tell your doctor:   If you have any allergies.   If there s any chance you are pregnant.   If you are breastfeeding.   If you have any special needs.  You do not need to do anything special to prepare.  Please wear loose clothing, such as a sweat suit or jogging clothes. Avoid snaps, zippers and other metal. We may ask you to undress and put on a hospital gown.            May 17, 2017  9:00 AM CDT   Level 5 with  INFUSION CHAIR 18   [facility] ([facility])    [facility]  [address]  [facility] [address]   [phone]            May 24, 2017  8:30 AM CDT   Level 5 with  INFUSION CHAIR 3   [facility] ([facility])    [facility]  [address]  [facility] [address]   [phone]            May 31, 2017  8:00 AM CDT   Level 5 with  INFUSION CHAIR 14   [facility] ([facility])    [facility]  [address]  [facility] [address]   [phone]            2017  4:00 PM CDT   RETURN ONC with Rafita Rogel MD   [facility] Blood and Marrow Transplant ([facility])    [address]  2nd Floor  [facility] [address]   [phone]            2017  8:30 AM CDT   Level 5 with  INFUSION CHAIR 18   [facility] ([facility])    [facility]  [address] [facility] [address]  [facility] [address]   [phone]              Who to contact     If you have questions or need follow up information about today's clinic visit or your schedule please contact [facility] directly at [phone].  Normal or non-critical lab and imaging results will be communicated to you by MyChart, letter or phone within 4 business days after the clinic has received the results. If you do not hear from us within 7 days, please contact the clinic through MyChart or phone. If you have a critical or abnormal lab result, we will notify you by phone as soon as possible.  Submit refill requests through [employer] or call your pharmacy and they will forward the refill request to us. Please allow 3 business days for your refill to be completed.          Additional Information About Your Visit        MyChart Information     [employer] lets you send messages to your doctor, view your test results, renew your prescriptions, schedule appointments and more. To sign up, go to [email or web address] . Click on \"Log in\" on the left side of the screen, which will take you to the Welcome page. Then click on \"Sign up Now\" on the right side of the page.     You will be asked to enter the access code listed below, as well as some personal information. Please follow the directions to create your username and password.     Your access code is: [email or web address]  Expires: 2017  1:59 PM     Your access code will  in " 90 days. If you need help or a new code, please call your [place] clinic or [phone].        Care EveryWhere ID     This is your Care EveryWhere ID. This could be used by other organizations to access your [place] medical records  [id number]         Blood Pressure from Last 3 Encounters:   05/08/17 116/79   05/04/17 (!) 167/97   05/03/17 126/80    Weight from Last 3 Encounters:   05/08/17 58.3 kg (128 lb 9.6 oz)   05/04/17 58.2 kg (128 lb 4.9 oz)   04/24/17 58.7 kg (129 lb 4.8 oz)              We Performed the Following     CBC with platelets differential     Comprehensive metabolic panel     Cyclosporine        Primary Care Provider Office Phone # Fax #    Rafita Rogel [phone]        [facility] [address] [facility] [address]  [facility] [address]        Thank you!     Thank you for choosing [facility]  for your care. Our goal is always to provide you with excellent care. Hearing back from our patients is one way we can continue to improve our services. Please take a few minutes to complete the written survey that you may receive in the mail after your visit with us. Thank you!             Your Updated Medication List - Protect others around you: Learn how to safely use, store and throw away your medicines at www.disposemymeds.org.          This list is accurate as of: 5/10/17 10:04 AM.  Always use your most recent med list.                   Brand Name Dispense Instructions for use    BENADRYL ALLERGY 25 MG tablet   Generic drug:  diphenhydrAMINE     56 tablet    Take 1 tablet (25 mg) by mouth nightly as needed       chlorpheniramine 4 MG tablet    CHLOR-TRIMETON     Take 4 mg by mouth every 6 hours as needed. For head cold       COMPRESSION STOCKINGS     2 each    Wear compression stockings at 20-30 mmHg rating most time during the day to the affected leg (left leg) or both legs. Take them off at night.       * cycloSPORINE modified 25 MG capsule     GENERIC EQUIVALENT    300 capsule    Take 75 mg by mouth 2 times daily       * cycloSPORINE modified 100 MG capsule    GENERIC EQUIVALENT    120 capsule    Take 1 capsule (100 mg) by mouth 2 times daily Reported on 5/4/2017       diclofenac 1 % Gel topical gel    VOLTAREN    100 g    Apply 2 g topically 2 times daily       eltrombopag 50 MG tablet    PROMACTA    90 tablet    Take 3 tablets (150 mg) by mouth daily Administer on an empty stomach, 1 hour before or 2 hours after a meal. Or as directed       loratadine 10 MG tablet    CLARITIN     Take 10 mg by mouth daily as needed Reported on 4/26/2017       ondansetron 4 MG ODT tab    ZOFRAN-ODT    20 tablet    Take 1 tablet (4 mg) by mouth every 6 hours as needed for nausea       order for DME     1 Box    Equipment being ordered: knee high compression stockings- 18-20 mm       oxyCODONE 5 MG IR tablet    ROXICODONE    50 tablet    Take 1 tablet (5 mg) by mouth every 6 hours as needed for moderate to severe pain       pantoprazole 40 MG EC tablet    PROTONIX    60 tablet    Take 1 tablet (40 mg) by mouth 2 times daily       polyethylene glycol Packet    MIRALAX/GLYCOLAX    14 packet    Take 17 g by mouth daily as needed for constipation . If you start to have loose stools/diarrhea or multiple bowel movements, ok to hold this medication. Then resume if no bowel movement after 24-48hours.       pseudoePHEDrine 120 MG 12 hr tablet    SUDAFED     Take 120 mg by mouth every 12 hours       SENNA S 8.6-50 MG per tablet   Generic drug:  senna-docusate     100 tablet    Can take 1-2 tablets every other day       sucralfate 1 GM/10ML suspension    CARAFATE    1200 mL    Take 10 mLs (1 g) by mouth 2 times daily as needed       TYLENOL PO      Take 325 mg by mouth every 6 hours as needed for mild pain or fever       * Notice:  This list has 2 medication(s) that are the same as other medications prescribed for you. Read the directions carefully, and ask your doctor or other  care provider to review them with you.

## 2017-05-11 ENCOUNTER — CARE COORDINATION (OUTPATIENT)
Dept: ONCOLOGY | Facility: CLINIC | Age: 72
End: 2017-05-11

## 2017-05-11 ENCOUNTER — HOSPITAL ENCOUNTER (OUTPATIENT)
Dept: CT IMAGING | Facility: CLINIC | Age: 72
Discharge: HOME | End: 2017-05-11
Attending: PHYSICIAN ASSISTANT | Admitting: PHYSICIAN ASSISTANT
Payer: COMMERCIAL

## 2017-05-11 DIAGNOSIS — Z87.898 HISTORY OF SOLITARY PULMONARY NODULE: ICD-10-CM

## 2017-05-11 PROCEDURE — 71250 CT THORAX DX C-: CPT

## 2017-05-11 NOTE — PROGRESS NOTES
Called patient this afternoon per the request of Sejal Walls PA-C to follow up with her as her CSA is low at 140.  Per patient she is taking her CSA as prescribed and has only had a couple episodes of nausea but they have been in the middle of the night.  Per Sejal's recommendation she should increase her CSA dose to 125 MG BID. Patient verbalized understanding and will increase the dose tonight.  She will call if she has any questions, comments, or concerns.

## 2017-05-15 DIAGNOSIS — E04.1 THYROID NODULE: Primary | ICD-10-CM

## 2017-05-15 NOTE — PROGRESS NOTES
Called pt home # and LM that CT chest okay, but that I ordered thyroid u/s to further eval the nodule

## 2017-05-17 ENCOUNTER — HOSPITAL ENCOUNTER (OUTPATIENT)
Facility: CLINIC | Age: 72
Setting detail: SPECIMEN
Discharge: HOME | End: 2017-05-17
Attending: INTERNAL MEDICINE | Admitting: INTERNAL MEDICINE
Payer: COMMERCIAL

## 2017-05-17 ENCOUNTER — INFUSION THERAPY VISIT (OUTPATIENT)
Dept: INFUSION THERAPY | Facility: CLINIC | Age: 72
End: 2017-05-17
Attending: INTERNAL MEDICINE
Payer: COMMERCIAL

## 2017-05-17 DIAGNOSIS — K21.9 GASTROESOPHAGEAL REFLUX DISEASE WITHOUT ESOPHAGITIS: ICD-10-CM

## 2017-05-17 DIAGNOSIS — D61.818 PANCYTOPENIA (H): ICD-10-CM

## 2017-05-17 DIAGNOSIS — K59.00 CONSTIPATION, UNSPECIFIED CONSTIPATION TYPE: ICD-10-CM

## 2017-05-17 DIAGNOSIS — M54.50 LEFT-SIDED LOW BACK PAIN WITHOUT SCIATICA, UNSPECIFIED CHRONICITY: ICD-10-CM

## 2017-05-17 DIAGNOSIS — D61.3 IDIOPATHIC APLASTIC ANEMIA (H): ICD-10-CM

## 2017-05-17 DIAGNOSIS — K64.9 HEMORRHOIDS, UNSPECIFIED HEMORRHOID TYPE: ICD-10-CM

## 2017-05-17 LAB
ALBUMIN SERPL-MCNC: 3.2 G/DL (ref 3.4–5)
ALP SERPL-CCNC: 91 U/L (ref 40–150)
ALT SERPL W P-5'-P-CCNC: 25 U/L (ref 0–50)
ANION GAP SERPL CALCULATED.3IONS-SCNC: 8 MMOL/L (ref 3–14)
ANISOCYTOSIS BLD QL SMEAR: ABNORMAL
AST SERPL W P-5'-P-CCNC: 30 U/L (ref 0–45)
BASOPHILS # BLD AUTO: 0 10E9/L (ref 0–0.2)
BASOPHILS NFR BLD AUTO: 0.3 %
BILIRUB SERPL-MCNC: 2 MG/DL (ref 0.2–1.3)
BUN SERPL-MCNC: 28 MG/DL (ref 7–30)
CALCIUM SERPL-MCNC: 8.7 MG/DL (ref 8.5–10.1)
CHLORIDE SERPL-SCNC: 108 MMOL/L (ref 94–109)
CO2 SERPL-SCNC: 23 MMOL/L (ref 20–32)
CREAT SERPL-MCNC: 1.04 MG/DL (ref 0.52–1.04)
CYCLOSPORINE BLD LC/MS/MS-MCNC: 183 UG/L (ref 50–400)
DIFFERENTIAL METHOD BLD: ABNORMAL
EOSINOPHIL # BLD AUTO: 0.1 10E9/L (ref 0–0.7)
EOSINOPHIL NFR BLD AUTO: 1.4 %
ERYTHROCYTE [DISTWIDTH] IN BLOOD BY AUTOMATED COUNT: 23.8 % (ref 10–15)
GFR SERPL CREATININE-BSD FRML MDRD: 52 ML/MIN/1.7M2
GLUCOSE SERPL-MCNC: 119 MG/DL (ref 70–99)
HCT VFR BLD AUTO: 24.6 % (ref 35–47)
HGB BLD-MCNC: 8.2 G/DL (ref 11.7–15.7)
IMM GRANULOCYTES # BLD: 0 10E9/L (ref 0–0.4)
IMM GRANULOCYTES NFR BLD: 0.3 %
LYMPHOCYTES # BLD AUTO: 1.7 10E9/L (ref 0.8–5.3)
LYMPHOCYTES NFR BLD AUTO: 47.9 %
MAGNESIUM SERPL-MCNC: 1.7 MG/DL (ref 1.6–2.3)
MCH RBC QN AUTO: 38.1 PG (ref 26.5–33)
MCHC RBC AUTO-ENTMCNC: 33.3 G/DL (ref 31.5–36.5)
MCV RBC AUTO: 114 FL (ref 78–100)
MONOCYTES # BLD AUTO: 0.3 10E9/L (ref 0–1.3)
MONOCYTES NFR BLD AUTO: 8 %
NEUTROPHILS # BLD AUTO: 1.5 10E9/L (ref 1.6–8.3)
NEUTROPHILS NFR BLD AUTO: 42.1 %
PLATELET # BLD AUTO: 35 10E9/L (ref 150–450)
PLATELET # BLD EST: ABNORMAL 10*3/UL
POTASSIUM SERPL-SCNC: 5.8 MMOL/L (ref 3.4–5.3)
PROT SERPL-MCNC: 7.1 G/DL (ref 6.8–8.8)
RBC # BLD AUTO: 2.15 10E12/L (ref 3.8–5.2)
RBC INCLUSIONS BLD: ABNORMAL
RETICS # AUTO: 57.8 10E9/L (ref 25–95)
RETICS/RBC NFR AUTO: 2.7 % (ref 0.5–2)
SODIUM SERPL-SCNC: 139 MMOL/L (ref 133–144)
TME LAST DOSE: NORMAL H
WBC # BLD AUTO: 3.5 10E9/L (ref 4–11)

## 2017-05-17 PROCEDURE — 80158 DRUG ASSAY CYCLOSPORINE: CPT | Performed by: PHYSICIAN ASSISTANT

## 2017-05-17 PROCEDURE — 84165 PROTEIN E-PHORESIS SERUM: CPT | Performed by: INTERNAL MEDICINE

## 2017-05-17 PROCEDURE — 85045 AUTOMATED RETICULOCYTE COUNT: CPT | Performed by: INTERNAL MEDICINE

## 2017-05-17 PROCEDURE — 36592 COLLECT BLOOD FROM PICC: CPT

## 2017-05-17 PROCEDURE — 85025 COMPLETE CBC W/AUTO DIFF WBC: CPT | Performed by: INTERNAL MEDICINE

## 2017-05-17 PROCEDURE — 83735 ASSAY OF MAGNESIUM: CPT | Performed by: INTERNAL MEDICINE

## 2017-05-17 PROCEDURE — 80053 COMPREHEN METABOLIC PANEL: CPT | Performed by: INTERNAL MEDICINE

## 2017-05-17 PROCEDURE — 00000402 ZZHCL STATISTIC TOTAL PROTEIN: Performed by: INTERNAL MEDICINE

## 2017-05-17 ASSESSMENT — PAIN SCALES - GENERAL: PAINLEVEL: MILD PAIN (2)

## 2017-05-17 NOTE — MR AVS SNAPSHOT
After Visit Summary   5/17/2017    Bonny Raymundo    MRN: [id number]           Patient Information     Date Of Birth          1945        Visit Information        Provider Department      5/17/2017 9:00 AM  INFUSION CHAIR 18 [facility]fusion [facility]        Today's Diagnoses     Pancytopenia (H)        Idiopathic aplastic anemia (H)        Gastroesophageal reflux disease without esophagitis        Left-sided low back pain without sciatica, unspecified chronicity        Constipation, unspecified constipation type        Hemorrhoids, unspecified hemorrhoid type           Follow-ups after your visit        Your next 10 appointments already scheduled     May 18, 2017  9:30 AM CDT   US THYROID with SHUS5   [facility] Ultrasound ([facility])    [address] [address] [phone]           Please bring a list of your medicines (including vitamins, minerals and over-the-counter drugs). Also, tell your doctor about any allergies you may have. Wear comfortable clothes and leave your valuables at home.  You do not need to do anything special to prepare for your exam.  Please call the Imaging Department at your exam site with any questions.            May 24, 2017  8:30 AM CDT   Level 5 with  INFUSION CHAIR 3   [facility] ([facility])    [facility]  [address]  [facility] [address]   [phone]            May 31, 2017  8:00 AM CDT   Level 5 with  INFUSION CHAIR 14   [facility] ([facility])    [facility]  [address]  [facility] [address]   [phone]            Jun 01, 2017  4:00 PM CDT   RETURN ONC with Rafita Rogel MD   [facility] Blood and Marrow Transplant ([facility])    [address]  2nd Floor  [facility] [address]    [phone]            Jun 07, 2017  8:30 AM CDT   Level 5 with SH INFUSION CHAIR 18   [facility] ([facility])    [facility] [place]  [address]  [place] [address]   [phone]            Jun 14, 2017  9:00 AM CDT   Level 5 with SH INFUSION CHAIR 14   [facility]fusion Center ([facility])    [facility] [place] [address]  [place] [address]   [phone]            Jun 21, 2017  9:00 AM CDT   Level 5 with SH INFUSION CHAIR 12   [facility] and Infusion Center ([facility])    [facility] [place]  [address]  [place] [address]   [phone]            Jun 28, 2017  9:00 AM CDT   Level 5 with SH INFUSION CHAIR 12   [facility] and Infusion Center ([facility])    [facility] [place] [address]  [place] [address]   [phone]              Who to contact     If you have questions or need follow up information about today's clinic visit or your schedule please contact [facility] [place] directly at [phone].  Normal or non-critical lab and imaging results will be communicated to you by [employer], letter or phone within 4 business days after the clinic has received the results. If you do not hear from us within 7 days, please contact the clinic through [employer] or phone. If you have a critical or abnormal lab result, we will notify you by phone as soon as possible.  Submit refill requests through [employer] or call your pharmacy and they will forward the refill request to us. Please allow 3 business days for your refill to be completed.          Additional Information About Your Visit        [employer] Information     [employer] lets you send messages to your doctor, view your test results, renew your prescriptions, schedule appointments and  "more. To sign up, go to [email or web address] . Click on \"Log in\" on the left side of the screen, which will take you to the Welcome page. Then click on \"Sign up Now\" on the right side of the page.     You will be asked to enter the access code listed below, as well as some personal information. Please follow the directions to create your username and password.     Your access code is: [email or web address]  Expires: 2017  1:59 PM     Your access code will  in 90 days. If you need help or a new code, please call your [place] clinic or [phone].        Care EveryWhere ID     This is your Care EveryWhere ID. This could be used by other organizations to access your [place] medical records  [id number]         Blood Pressure from Last 3 Encounters:   17 116/79   17 (!) 167/97   17 126/80    Weight from Last 3 Encounters:   17 58.3 kg (128 lb 9.6 oz)   17 58.2 kg (128 lb 4.9 oz)   17 58.7 kg (129 lb 4.8 oz)              We Performed the Following     CBC with platelets differential     Comprehensive metabolic panel     Cyclosporine     Magnesium     Protein electrophoresis     Reticulocyte count        Primary Care Provider Office Phone # Fax #    Rafita Rogel [phone]        PH[facility]IC[facility] [address]  [facility] [address]        Thank you!     Thank you for choosing [facility]ER [facility]  for your care. Our goal is always to provide you with excellent care. Hearing back from our patients is one way we can continue to improve our services. Please take a few minutes to complete the written survey that you may receive in the mail after your visit with us. Thank you!             Your Updated Medication List - Protect others around you: Learn how to safely use, store and throw away your medicines at www.disposemymeds.org.          This list is accurate as of: 17 10:46 AM.  Always use your most recent med list. "                   Brand Name Dispense Instructions for use    BENADRYL ALLERGY 25 MG tablet   Generic drug:  diphenhydrAMINE     56 tablet    Take 1 tablet (25 mg) by mouth nightly as needed       chlorpheniramine 4 MG tablet    CHLOR-TRIMETON     Take 4 mg by mouth every 6 hours as needed. For head cold       COMPRESSION STOCKINGS     2 each    Wear compression stockings at 20-30 mmHg rating most time during the day to the affected leg (left leg) or both legs. Take them off at night.       * cycloSPORINE modified 25 MG capsule    GENERIC EQUIVALENT    300 capsule    Take 75 mg by mouth 2 times daily       * cycloSPORINE modified 100 MG capsule    GENERIC EQUIVALENT    120 capsule    Take 1 capsule (100 mg) by mouth 2 times daily Reported on 5/4/2017       diclofenac 1 % Gel topical gel    VOLTAREN    100 g    Apply 2 g topically 2 times daily       eltrombopag 50 MG tablet    PROMACTA    90 tablet    Take 3 tablets (150 mg) by mouth daily Administer on an empty stomach, 1 hour before or 2 hours after a meal. Or as directed       loratadine 10 MG tablet    CLARITIN     Take 10 mg by mouth daily as needed Reported on 4/26/2017       ondansetron 4 MG ODT tab    ZOFRAN-ODT    20 tablet    Take 1 tablet (4 mg) by mouth every 6 hours as needed for nausea       order for DME     1 Box    Equipment being ordered: knee high compression stockings- 18-20 mm       oxyCODONE 5 MG IR tablet    ROXICODONE    50 tablet    Take 1 tablet (5 mg) by mouth every 6 hours as needed for moderate to severe pain       pantoprazole 40 MG EC tablet    PROTONIX    60 tablet    Take 1 tablet (40 mg) by mouth 2 times daily       polyethylene glycol Packet    MIRALAX/GLYCOLAX    14 packet    Take 17 g by mouth daily as needed for constipation . If you start to have loose stools/diarrhea or multiple bowel movements, ok to hold this medication. Then resume if no bowel movement after 24-48hours.       pseudoePHEDrine 120 MG 12 hr tablet    SUDAFED      Take 120 mg by mouth every 12 hours       SENNA S 8.6-50 MG per tablet   Generic drug:  senna-docusate     100 tablet    Can take 1-2 tablets every other day       sucralfate 1 GM/10ML suspension    CARAFATE    1200 mL    Take 10 mLs (1 g) by mouth 2 times daily as needed       TYLENOL PO      Take 325 mg by mouth every 6 hours as needed for mild pain or fever       * Notice:  This list has 2 medication(s) that are the same as other medications prescribed for you. Read the directions carefully, and ask your doctor or other care provider to review them with you.

## 2017-05-17 NOTE — PROGRESS NOTES
Infusion Nursing Note:  Bonny Raymundo presents today for transfusion.    Patient seen by provider today: No   present during visit today: Not Applicable.    Note: N/A.    Intravenous Access:  PICC.    Treatment Conditions:  Lab Results   Component Value Date    HGB 8.2 05/17/2017     Lab Results   Component Value Date    WBC 3.5 05/17/2017      Lab Results   Component Value Date    ANEU 1.5 05/17/2017     Lab Results   Component Value Date    PLT 35 05/17/2017      Lab Results   Component Value Date     05/17/2017                   Lab Results   Component Value Date    POTASSIUM 5.8 05/17/2017           Lab Results   Component Value Date    MAG 1.7 05/17/2017            Lab Results   Component Value Date    CR 1.04 05/17/2017                   Lab Results   Component Value Date    LEO 8.7 05/17/2017                Lab Results   Component Value Date    BILITOTAL 2.0 05/17/2017           Lab Results   Component Value Date    ALBUMIN 3.2 05/17/2017                    Lab Results   Component Value Date    ALT 25 05/17/2017           Lab Results   Component Value Date    AST 30 05/17/2017     Results reviewed, labs did NOT meet treatment parameters: Hgb 8.2, plt 35.      Post Infusion Assessment:  Patient tolerated lab draw and dressing change without incident.  Site patent and intact, free from redness, edema or discomfort.  No evidence of extravasations.    Discharge Plan:   Patient and/or family verbalized understanding of discharge instructions and all questions answered.  Copy of AVS reviewed with patient and/or family.  Patient will return in 1 week for next appointment.  Patient discharged in stable condition accompanied by: self.  Departure Mode: Ambulatory.    Laisha Jeffery RN

## 2017-05-18 ENCOUNTER — HOSPITAL ENCOUNTER (OUTPATIENT)
Dept: ULTRASOUND IMAGING | Facility: CLINIC | Age: 72
Discharge: HOME | End: 2017-05-18
Attending: PHYSICIAN ASSISTANT | Admitting: PHYSICIAN ASSISTANT
Payer: COMMERCIAL

## 2017-05-18 DIAGNOSIS — E04.1 THYROID NODULE: ICD-10-CM

## 2017-05-18 LAB
ALBUMIN SERPL ELPH-MCNC: 3.9 G/DL (ref 3.7–5.1)
ALPHA1 GLOB SERPL ELPH-MCNC: 0.4 G/DL (ref 0.2–0.4)
ALPHA2 GLOB SERPL ELPH-MCNC: 0.6 G/DL (ref 0.5–0.9)
B-GLOBULIN SERPL ELPH-MCNC: 0.7 G/DL (ref 0.6–1)
GAMMA GLOB SERPL ELPH-MCNC: 0.9 G/DL (ref 0.7–1.6)
M PROTEIN SERPL ELPH-MCNC: 0.1 G/DL
PROT PATTERN SERPL ELPH-IMP: ABNORMAL

## 2017-05-18 PROCEDURE — 76536 US EXAM OF HEAD AND NECK: CPT

## 2017-05-22 ENCOUNTER — TELEPHONE (OUTPATIENT)
Dept: FAMILY MEDICINE | Facility: CLINIC | Age: 72
End: 2017-05-22

## 2017-05-22 NOTE — ADDENDUM NOTE
Encounter addended by: Shivani Phelps PA-C on: 5/22/2017 12:57 PM<BR>     Actions taken: Results reviewed in IB

## 2017-05-22 NOTE — TELEPHONE ENCOUNTER
Reason for Call:  Other PCP    Detailed comments: pt said that Shivani is her PCP and there is none  Listed. Could you find out if Shivani is please.    Phone Number Patient can be reached at: Home number on file [phone] (home)    Best Time: any    Can we leave a detailed message on this number? YES    Call taken on 5/22/2017 at 9:36 AM by Lisseth Hernandez

## 2017-05-24 ENCOUNTER — HOSPITAL ENCOUNTER (OUTPATIENT)
Facility: CLINIC | Age: 72
Setting detail: SPECIMEN
Discharge: HOME | End: 2017-05-24
Attending: INTERNAL MEDICINE | Admitting: INTERNAL MEDICINE
Payer: COMMERCIAL

## 2017-05-24 ENCOUNTER — INFUSION THERAPY VISIT (OUTPATIENT)
Dept: INFUSION THERAPY | Facility: CLINIC | Age: 72
End: 2017-05-24
Attending: INTERNAL MEDICINE
Payer: COMMERCIAL

## 2017-05-24 VITALS
SYSTOLIC BLOOD PRESSURE: 128 MMHG | RESPIRATION RATE: 18 BRPM | DIASTOLIC BLOOD PRESSURE: 63 MMHG | TEMPERATURE: 97.8 F | HEART RATE: 98 BPM

## 2017-05-24 DIAGNOSIS — E87.5 HYPERKALEMIA: ICD-10-CM

## 2017-05-24 DIAGNOSIS — D61.818 PANCYTOPENIA (H): Primary | ICD-10-CM

## 2017-05-24 DIAGNOSIS — D61.3 IDIOPATHIC APLASTIC ANEMIA (H): ICD-10-CM

## 2017-05-24 LAB
ALBUMIN SERPL-MCNC: 3.2 G/DL (ref 3.4–5)
ALP SERPL-CCNC: 86 U/L (ref 40–150)
ALT SERPL W P-5'-P-CCNC: 23 U/L (ref 0–50)
ANION GAP SERPL CALCULATED.3IONS-SCNC: 8 MMOL/L (ref 3–14)
AST SERPL W P-5'-P-CCNC: 34 U/L (ref 0–45)
BASOPHILS # BLD AUTO: 0 10E9/L (ref 0–0.2)
BASOPHILS NFR BLD AUTO: 0 %
BILIRUB SERPL-MCNC: 2 MG/DL (ref 0.2–1.3)
BUN SERPL-MCNC: 29 MG/DL (ref 7–30)
CALCIUM SERPL-MCNC: 8.4 MG/DL (ref 8.5–10.1)
CHLORIDE SERPL-SCNC: 109 MMOL/L (ref 94–109)
CO2 SERPL-SCNC: 21 MMOL/L (ref 20–32)
CREAT SERPL-MCNC: 1.09 MG/DL (ref 0.52–1.04)
CYCLOSPORINE BLD LC/MS/MS-MCNC: 199 UG/L (ref 50–400)
DIFFERENTIAL METHOD BLD: ABNORMAL
EOSINOPHIL # BLD AUTO: 0 10E9/L (ref 0–0.7)
EOSINOPHIL NFR BLD AUTO: 0 %
ERYTHROCYTE [DISTWIDTH] IN BLOOD BY AUTOMATED COUNT: 22.9 % (ref 10–15)
GFR SERPL CREATININE-BSD FRML MDRD: 49 ML/MIN/1.7M2
GLUCOSE SERPL-MCNC: 125 MG/DL (ref 70–99)
HCT VFR BLD AUTO: 24 % (ref 35–47)
HGB BLD-MCNC: 8.1 G/DL (ref 11.7–15.7)
LYMPHOCYTES # BLD AUTO: 1.7 10E9/L (ref 0.8–5.3)
LYMPHOCYTES NFR BLD AUTO: 60 %
MCH RBC QN AUTO: 38.9 PG (ref 26.5–33)
MCHC RBC AUTO-ENTMCNC: 33.8 G/DL (ref 31.5–36.5)
MCV RBC AUTO: 115 FL (ref 78–100)
MONOCYTES # BLD AUTO: 0.1 10E9/L (ref 0–1.3)
MONOCYTES NFR BLD AUTO: 4 %
NEUTROPHILS # BLD AUTO: 1 10E9/L (ref 1.6–8.3)
NEUTROPHILS NFR BLD AUTO: 36 %
NRBC # BLD AUTO: 0 10*3/UL
NRBC BLD AUTO-RTO: 1 /100
PLATELET # BLD AUTO: 33 10E9/L (ref 150–450)
POTASSIUM SERPL-SCNC: 3.8 MMOL/L (ref 3.4–5.3)
POTASSIUM SERPL-SCNC: 6.2 MMOL/L (ref 3.4–5.3)
PROT SERPL-MCNC: 6.9 G/DL (ref 6.8–8.8)
RBC # BLD AUTO: 2.08 10E12/L (ref 3.8–5.2)
SODIUM SERPL-SCNC: 138 MMOL/L (ref 133–144)
TME LAST DOSE: NORMAL H
WBC # BLD AUTO: 2.8 10E9/L (ref 4–11)

## 2017-05-24 PROCEDURE — 80158 DRUG ASSAY CYCLOSPORINE: CPT | Performed by: PHYSICIAN ASSISTANT

## 2017-05-24 PROCEDURE — 80053 COMPREHEN METABOLIC PANEL: CPT | Performed by: INTERNAL MEDICINE

## 2017-05-24 PROCEDURE — 85025 COMPLETE CBC W/AUTO DIFF WBC: CPT | Performed by: INTERNAL MEDICINE

## 2017-05-24 PROCEDURE — 84132 ASSAY OF SERUM POTASSIUM: CPT | Performed by: INTERNAL MEDICINE

## 2017-05-24 PROCEDURE — 25000125 ZZHC RX 250: Performed by: NURSE PRACTITIONER

## 2017-05-24 PROCEDURE — 94642 AEROSOL INHALATION TREATMENT: CPT

## 2017-05-24 RX ORDER — ALBUTEROL SULFATE 0.83 MG/ML
2.5 SOLUTION RESPIRATORY (INHALATION) EVERY 6 HOURS PRN
Status: DISCONTINUED | OUTPATIENT
Start: 2017-05-24 | End: 2017-05-24 | Stop reason: HOSPADM

## 2017-05-24 RX ORDER — PENTAMIDINE ISETHIONATE 300 MG/300MG
300 INHALANT RESPIRATORY (INHALATION) ONCE
Status: COMPLETED | OUTPATIENT
Start: 2017-05-24 | End: 2017-05-24

## 2017-05-24 RX ORDER — ALBUTEROL SULFATE 5 MG/ML
2.5 SOLUTION RESPIRATORY (INHALATION) EVERY 6 HOURS PRN
Status: CANCELLED
Start: 2017-06-01

## 2017-05-24 RX ORDER — PENTAMIDINE ISETHIONATE 300 MG/300MG
300 INHALANT RESPIRATORY (INHALATION) ONCE
Status: CANCELLED
Start: 2017-06-01 | End: 2017-06-01

## 2017-05-24 RX ADMIN — ALBUTEROL SULFATE 2.5 MG: 2.5 SOLUTION RESPIRATORY (INHALATION) at 10:55

## 2017-05-24 RX ADMIN — PENTAMIDINE ISETHIONATE 300 MG: 300 INHALANT RESPIRATORY (INHALATION) at 11:10

## 2017-05-24 NOTE — MR AVS SNAPSHOT
After Visit Summary   5/24/2017    Bonny Raymundo    MRN: [id number]           Patient Information     Date Of Birth          1945        Visit Information        Provider Department      5/24/2017 8:30 AM  INFUSION CHAIR 3 [facility]        Today's Diagnoses     Pancytopenia (H)    -  1    Idiopathic aplastic anemia (H)        Hyperkalemia           Follow-ups after your visit        Your next 10 appointments already scheduled     May 31, 2017  8:00 AM CDT   Level 5 with  INFUSION CHAIR 14   [facility] ([facility])    [facility]  [address]  [place] [address]   [phone]            May 31, 2017  1:00 PM CDT   Office Visit with Shivani Phelps PA-C   [facility] ([facility])    [address] [facility] [address]   [phone]           Bring a current list of meds and any records pertaining to this visit.  For Physicals, please bring immunization records and any forms needing to be filled out.  Please arrive 10 minutes early to complete paperwork.            Jun 01, 2017  4:00 PM CDT   RETURN ONC with Rafita Rogel MD   [facility] ([facility])    [address] [place] [address]   [phone]            Jun 07, 2017  8:30 AM CDT   Level 5 with  INFUSION CHAIR 18   [facility] ([facility])    [facility]  [address]  [place] [address]   [phone]            Jun 14, 2017  9:00 AM CDT   Level 5 with  INFUSION CHAIR 14   [facility] ([facility])    [facility]  [address]  [place] [address]   [phone]            Jun 21, 2017  9:00 AM CDT   Level 5 with  INFUSION CHAIR 12  "  [facility] ([facility])    [facility] [place]  [address]  [place] [id number] [phone]            2017  9:00 AM CDT   Level 5 with  INFUSION CHAIR 12   [facility] ([facility])    [facility] [place]  [address]  [place] [id number] [phone]              Who to contact     If you have questions or need follow up information about today's clinic visit or your schedule please contact [facility] directly at [phone].  Normal or non-critical lab and imaging results will be communicated to you by Copybarhart, letter or phone within 4 business days after the clinic has received the results. If you do not hear from us within 7 days, please contact the clinic through Copybarhart or phone. If you have a critical or abnormal lab result, we will notify you by phone as soon as possible.  Submit refill requests through "[employer] or call your pharmacy and they will forward the refill request to us. Please allow 3 business days for your refill to be completed.          Additional Information About Your Visit        [employer] Information     "[employer] lets you send messages to your doctor, view your test results, renew your prescriptions, schedule appointments and more. To sign up, go to [email or web address] . Click on \"Log in\" on the left side of the screen, which will take you to the Welcome page. Then click on \"Sign up Now\" on the right side of the page.     You will be asked to enter the access code listed below, as well as some personal information. Please follow the directions to create your username and password.     Your access code is: [email or web address]  Expires: 2017  1:59 PM     Your access code will  in 90 days. If you need help or a new code, please call your [facility] clinic or [phone].        Care EveryWhere " ID     This is your Care EveryWhere ID. This could be used by other organizations to access your [place] medical records  [id number]        Your Vitals Were     Pulse Temperature Respirations             98 97.8  F (36.6  C) (Oral) 18          Blood Pressure from Last 3 Encounters:   05/24/17 128/63   05/08/17 116/79   05/04/17 (!) 167/97    Weight from Last 3 Encounters:   05/08/17 58.3 kg (128 lb 9.6 oz)   05/04/17 58.2 kg (128 lb 4.9 oz)   04/24/17 58.7 kg (129 lb 4.8 oz)              We Performed the Following     CBC with platelets differential     Comprehensive metabolic panel     Cyclosporine     Potassium        Primary Care Provider Office Phone # Fax #    Shivani Phelps PA-C [phone]       [facility] [place] [address]  [place] [address]        Thank you!     Thank you for choosing [facility] AND [facility]  for your care. Our goal is always to provide you with excellent care. Hearing back from our patients is one way we can continue to improve our services. Please take a few minutes to complete the written survey that you may receive in the mail after your visit with us. Thank you!             Your Updated Medication List - Protect others around you: Learn how to safely use, store and throw away your medicines at www.disposemymeds.org.          This list is accurate as of: 5/24/17  4:43 PM.  Always use your most recent med list.                   Brand Name Dispense Instructions for use    BENADRYL ALLERGY 25 MG tablet   Generic drug:  diphenhydrAMINE     56 tablet    Take 1 tablet (25 mg) by mouth nightly as needed       chlorpheniramine 4 MG tablet    CHLOR-TRIMETON     Take 4 mg by mouth every 6 hours as needed. For head cold       COMPRESSION STOCKINGS     2 each    Wear compression stockings at 20-30 mmHg rating most time during the day to the affected leg (left leg) or both legs. Take them off at night.       * cycloSPORINE modified 25 MG  capsule    GENERIC EQUIVALENT    300 capsule    Take 75 mg by mouth 2 times daily       * cycloSPORINE modified 100 MG capsule    GENERIC EQUIVALENT    120 capsule    Take 1 capsule (100 mg) by mouth 2 times daily Reported on 5/4/2017       diclofenac 1 % Gel topical gel    VOLTAREN    100 g    Apply 2 g topically 2 times daily       eltrombopag 50 MG tablet    PROMACTA    90 tablet    Take 3 tablets (150 mg) by mouth daily Administer on an empty stomach, 1 hour before or 2 hours after a meal. Or as directed       loratadine 10 MG tablet    CLARITIN     Take 10 mg by mouth daily as needed Reported on 4/26/2017       ondansetron 4 MG ODT tab    ZOFRAN-ODT    20 tablet    Take 1 tablet (4 mg) by mouth every 6 hours as needed for nausea       order for DME     1 Box    Equipment being ordered: knee high compression stockings- 18-20 mm       oxyCODONE 5 MG IR tablet    ROXICODONE    50 tablet    Take 1 tablet (5 mg) by mouth every 6 hours as needed for moderate to severe pain       pantoprazole 40 MG EC tablet    PROTONIX    60 tablet    Take 1 tablet (40 mg) by mouth 2 times daily       polyethylene glycol Packet    MIRALAX/GLYCOLAX    14 packet    Take 17 g by mouth daily as needed for constipation . If you start to have loose stools/diarrhea or multiple bowel movements, ok to hold this medication. Then resume if no bowel movement after 24-48hours.       pseudoePHEDrine 120 MG 12 hr tablet    SUDAFED     Take 120 mg by mouth every 12 hours       SENNA S 8.6-50 MG per tablet   Generic drug:  senna-docusate     100 tablet    Can take 1-2 tablets every other day       sucralfate 1 GM/10ML suspension    CARAFATE    1200 mL    Take 10 mLs (1 g) by mouth 2 times daily as needed       TYLENOL PO      Take 325 mg by mouth every 6 hours as needed for mild pain or fever       * Notice:  This list has 2 medication(s) that are the same as other medications prescribed for you. Read the directions carefully, and ask your doctor or  other care provider to review them with you.

## 2017-05-24 NOTE — PROGRESS NOTES
Infusion Nursing Note:Bonny Raymundo presents today for .  possible transfusion; pentamidine neb    Patient seen by provider today: No   present during visit today: Not Applicable.    Note: N/A.    Intravenous Access:  PICC.    Treatment Conditions:  Lab Results   Component Value Date    HGB 8.1 05/24/2017     Lab Results   Component Value Date    WBC 2.8 05/24/2017      Lab Results   Component Value Date    ANEU 1.0 05/24/2017     Lab Results   Component Value Date    PLT 33 05/24/2017      Lab Results   Component Value Date     05/24/2017                   Lab Results   Component Value Date    POTASSIUM 3.8 05/24/2017           Lab Results   Component Value Date    MAG 1.7 05/17/2017            Lab Results   Component Value Date    CR 1.09 05/24/2017                   Lab Results   Component Value Date    LEO 8.4 05/24/2017                Lab Results   Component Value Date    BILITOTAL 2.0 05/24/2017           Lab Results   Component Value Date    ALBUMIN 3.2 05/24/2017                    Lab Results   Component Value Date    ALT 23 05/24/2017           Lab Results   Component Value Date    AST 34 05/24/2017     No transfusions today.  Post Infusion Assessment:  Patient tolerated inhallation without incident.    Discharge Plan:   Copy of AVS reviewed with patient and/or family. Patient discharged in stable condition accompanied by: self.  Departure Mode: Ambulatory.    ANABELLE Angel RN

## 2017-05-31 ENCOUNTER — INFUSION THERAPY VISIT (OUTPATIENT)
Dept: INFUSION THERAPY | Facility: CLINIC | Age: 72
End: 2017-05-31
Attending: INTERNAL MEDICINE
Payer: COMMERCIAL

## 2017-05-31 ENCOUNTER — HOSPITAL ENCOUNTER (OUTPATIENT)
Facility: CLINIC | Age: 72
Setting detail: SPECIMEN
Discharge: HOME | End: 2017-05-31
Attending: PHYSICIAN ASSISTANT | Admitting: PHYSICIAN ASSISTANT
Payer: COMMERCIAL

## 2017-05-31 ENCOUNTER — OFFICE VISIT (OUTPATIENT)
Dept: FAMILY MEDICINE | Facility: CLINIC | Age: 72
End: 2017-05-31
Payer: COMMERCIAL

## 2017-05-31 VITALS
BODY MASS INDEX: 21.6 KG/M2 | HEART RATE: 96 BPM | HEIGHT: 63 IN | SYSTOLIC BLOOD PRESSURE: 115 MMHG | WEIGHT: 121.9 LBS | TEMPERATURE: 97.8 F | DIASTOLIC BLOOD PRESSURE: 73 MMHG | OXYGEN SATURATION: 95 %

## 2017-05-31 VITALS
RESPIRATION RATE: 20 BRPM | TEMPERATURE: 97.5 F | HEART RATE: 95 BPM | DIASTOLIC BLOOD PRESSURE: 78 MMHG | SYSTOLIC BLOOD PRESSURE: 122 MMHG

## 2017-05-31 DIAGNOSIS — D61.3 IDIOPATHIC APLASTIC ANEMIA (H): ICD-10-CM

## 2017-05-31 DIAGNOSIS — H61.23 BILATERAL IMPACTED CERUMEN: ICD-10-CM

## 2017-05-31 DIAGNOSIS — J31.0 RHINITIS MEDICAMENTOSA: Primary | ICD-10-CM

## 2017-05-31 DIAGNOSIS — E04.1 THYROID NODULE: ICD-10-CM

## 2017-05-31 DIAGNOSIS — T48.5X5A RHINITIS MEDICAMENTOSA: Primary | ICD-10-CM

## 2017-05-31 DIAGNOSIS — D61.818 PANCYTOPENIA (H): ICD-10-CM

## 2017-05-31 DIAGNOSIS — R09.81 NASAL CONGESTION: ICD-10-CM

## 2017-05-31 LAB
ALBUMIN SERPL-MCNC: 3.2 G/DL (ref 3.4–5)
ALP SERPL-CCNC: 89 U/L (ref 40–150)
ALT SERPL W P-5'-P-CCNC: 21 U/L (ref 0–50)
ANION GAP SERPL CALCULATED.3IONS-SCNC: 9 MMOL/L (ref 3–14)
AST SERPL W P-5'-P-CCNC: 15 U/L (ref 0–45)
BASOPHILS # BLD AUTO: 0 10E9/L (ref 0–0.2)
BASOPHILS NFR BLD AUTO: 0 %
BILIRUB SERPL-MCNC: 2.1 MG/DL (ref 0.2–1.3)
BUN SERPL-MCNC: 31 MG/DL (ref 7–30)
CALCIUM SERPL-MCNC: 8.6 MG/DL (ref 8.5–10.1)
CHLORIDE SERPL-SCNC: 110 MMOL/L (ref 94–109)
CO2 SERPL-SCNC: 23 MMOL/L (ref 20–32)
CREAT SERPL-MCNC: 1.23 MG/DL (ref 0.52–1.04)
CYCLOSPORINE BLD LC/MS/MS-MCNC: 217 UG/L (ref 50–400)
DIFFERENTIAL METHOD BLD: ABNORMAL
EOSINOPHIL # BLD AUTO: 0 10E9/L (ref 0–0.7)
EOSINOPHIL NFR BLD AUTO: 1 %
ERYTHROCYTE [DISTWIDTH] IN BLOOD BY AUTOMATED COUNT: 21.6 % (ref 10–15)
GFR SERPL CREATININE-BSD FRML MDRD: 43 ML/MIN/1.7M2
GLUCOSE SERPL-MCNC: 118 MG/DL (ref 70–99)
HCT VFR BLD AUTO: 23.2 % (ref 35–47)
HGB BLD-MCNC: 8 G/DL (ref 11.7–15.7)
IMM GRANULOCYTES # BLD: 0 10E9/L (ref 0–0.4)
IMM GRANULOCYTES NFR BLD: 0 %
LYMPHOCYTES # BLD AUTO: 1.4 10E9/L (ref 0.8–5.3)
LYMPHOCYTES NFR BLD AUTO: 36.3 %
MCH RBC QN AUTO: 39.6 PG (ref 26.5–33)
MCHC RBC AUTO-ENTMCNC: 34.5 G/DL (ref 31.5–36.5)
MCV RBC AUTO: 115 FL (ref 78–100)
MONOCYTES # BLD AUTO: 0.3 10E9/L (ref 0–1.3)
MONOCYTES NFR BLD AUTO: 8.2 %
NEUTROPHILS # BLD AUTO: 2.1 10E9/L (ref 1.6–8.3)
NEUTROPHILS NFR BLD AUTO: 54.5 %
NRBC # BLD AUTO: 0 10*3/UL
NRBC BLD AUTO-RTO: 1 /100
PLATELET # BLD AUTO: 36 10E9/L (ref 150–450)
POTASSIUM SERPL-SCNC: 4.4 MMOL/L (ref 3.4–5.3)
PROT SERPL-MCNC: 7 G/DL (ref 6.8–8.8)
RBC # BLD AUTO: 2.02 10E12/L (ref 3.8–5.2)
SODIUM SERPL-SCNC: 142 MMOL/L (ref 133–144)
TME LAST DOSE: NORMAL H
WBC # BLD AUTO: 3.9 10E9/L (ref 4–11)

## 2017-05-31 PROCEDURE — 99213 OFFICE O/P EST LOW 20 MIN: CPT | Performed by: PHYSICIAN ASSISTANT

## 2017-05-31 PROCEDURE — 36592 COLLECT BLOOD FROM PICC: CPT

## 2017-05-31 PROCEDURE — 80053 COMPREHEN METABOLIC PANEL: CPT | Performed by: PHYSICIAN ASSISTANT

## 2017-05-31 PROCEDURE — 85025 COMPLETE CBC W/AUTO DIFF WBC: CPT | Performed by: PHYSICIAN ASSISTANT

## 2017-05-31 PROCEDURE — 80158 DRUG ASSAY CYCLOSPORINE: CPT | Performed by: PHYSICIAN ASSISTANT

## 2017-05-31 RX ORDER — FLUTICASONE PROPIONATE 50 MCG
1-2 SPRAY, SUSPENSION (ML) NASAL DAILY
Qty: 1 BOTTLE | Refills: 11 | Status: SHIPPED | OUTPATIENT
Start: 2017-05-31 | End: 2018-01-10

## 2017-05-31 ASSESSMENT — PAIN SCALES - GENERAL: PAINLEVEL: NO PAIN (1)

## 2017-05-31 NOTE — MR AVS SNAPSHOT
After Visit Summary   5/31/2017    Bonny Raymundo    MRN: [id number]           Patient Information     Date Of Birth          1945        Visit Information        Provider Department      5/31/2017 1:00 PM Shivani Phelps PA-C [facility]        Today's Diagnoses     Rhinitis medicamentosa    -  1    Bilateral impacted cerumen        Nasal congestion          Care Instructions    Stop using the the Afrin. This causes rebound congestion.  Start flonase nasal spray 2 sprays at bedtime.          Follow-ups after your visit        Your next 10 appointments already scheduled     May 31, 2017  1:00 PM CDT   Office Visit with Shivani Phelps PA-C   [facility]    [address] [facility] [address]   [phone]           Bring a current list of meds and any records pertaining to this visit.  For Physicals, please bring immunization records and any forms needing to be filled out.  Please arrive 10 minutes early to complete paperwork.            Jun 01, 2017  4:00 PM CDT   RETURN ONC with Rafita Rogel MD   [facility] ([facility])    [address] [place] [address]   [phone]            Jun 07, 2017  8:30 AM CDT   Level 5 with  INFUSION CHAIR 18   [facility] ([facility])    [facility]  [address]  [facility] [address]   [phone]            Jun 14, 2017  9:00 AM CDT   Level 5 with  INFUSION CHAIR 14   [facility] ([facility])    [facility]  [address]  [facility] [address]   [phone]            Jun 21, 2017  9:00 AM CDT   Level 5 with  INFUSION CHAIR 12   [facility] ([facility])    [facility]  [address]  "[place] [address]   [phone]            2017  9:00 AM CDT   Level 5 with  INFUSION CHAIR 12   [facility] ([facility])    [facility] [place]  [address]  [place] [address]   [phone]              Who to contact     If you have questions or need follow up information about today's clinic visit or your schedule please contact [facility] directly at [phone].  Normal or non-critical lab and imaging results will be communicated to you by SellStagehart, letter or phone within 4 business days after the clinic has received the results. If you do not hear from us within 7 days, please contact the clinic through [employer]ell[employer] or phone. If you have a critical or abnormal lab result, we will notify you by phone as soon as possible.  Submit refill requests through [employer] or call your pharmacy and they will forward the refill request to us. Please allow 3 business days for your refill to be completed.          Additional Information About Your Visit        MyChart Information     [employer] lets you send messages to your doctor, view your test results, renew your prescriptions, schedule appointments and more. To sign up, go to [email or web address] . Click on \"Log in\" on the left side of the screen, which will take you to the Welcome page. Then click on \"Sign up Now\" on the right side of the page.     You will be asked to enter the access code listed below, as well as some personal information. Please follow the directions to create your username and password.     Your access code is: [email or web address]  Expires: 2017  1:59 PM     Your access code will  in 90 days. If you need help or a new code, please call your [facility] or [phone].        Care EveryWhere ID     This is your Care EveryWhere ID. This could be used by other organizations to access your [facility] medical records  [id number]        Your " "Vitals Were     Pulse Temperature Height Pulse Oximetry BMI (Body Mass Index)       96 97.8  F (36.6  C) (Oral) 5' 2.99\" (1.6 m) 95% 21.6 kg/m2        Blood Pressure from Last 3 Encounters:   05/31/17 115/73   05/31/17 122/78   05/24/17 128/63    Weight from Last 3 Encounters:   05/31/17 121 lb 14.4 oz (55.3 kg)   05/08/17 128 lb 9.6 oz (58.3 kg)   05/04/17 128 lb 4.9 oz (58.2 kg)              Today, you had the following     No orders found for display         Today's Medication Changes          These changes are accurate as of: 5/31/17 12:39 PM.  If you have any questions, ask your nurse or doctor.               Start taking these medicines.        Dose/Directions    fluticasone 50 MCG/ACT spray   Commonly known as:  FLONASE   Used for:  Rhinitis medicamentosa, Bilateral impacted cerumen, Nasal congestion   Started by:  Shivani Phelps PA-C        Dose:  1-2 spray   Spray 1-2 sprays into both nostrils daily   Quantity:  1 Bottle   Refills:  11            Where to get your medicines      These medications were sent to [facility] - [address]  [address], [place] [address]     Phone:  [phone]     fluticasone 50 MCG/ACT spray                Primary Care Provider Office Phone # Fax #    Shivani Phelps PA-C [phone]       [facility] [address]   [facility] [address]        Thank you!     Thank you for choosing [facility]  for your care. Our goal is always to provide you with excellent care. Hearing back from our patients is one way we can continue to improve our services. Please take a few minutes to complete the written survey that you may receive in the mail after your visit with us. Thank you!             Your Updated Medication List - Protect others around you: Learn how to safely use, store and throw away your medicines at www.disposemymeds.org.          This list is accurate as of: 5/31/17 12:39 PM.  Always " use your most recent med list.                   Brand Name Dispense Instructions for use    BENADRYL ALLERGY 25 MG tablet   Generic drug:  diphenhydrAMINE     56 tablet    Take 1 tablet (25 mg) by mouth nightly as needed       chlorpheniramine 4 MG tablet    CHLOR-TRIMETON     Take 4 mg by mouth every 6 hours as needed. For head cold       COMPRESSION STOCKINGS     2 each    Wear compression stockings at 20-30 mmHg rating most time during the day to the affected leg (left leg) or both legs. Take them off at night.       * cycloSPORINE modified 25 MG capsule    GENERIC EQUIVALENT    300 capsule    Take 75 mg by mouth 2 times daily       * cycloSPORINE modified 100 MG capsule    GENERIC EQUIVALENT    120 capsule    Take 1 capsule (100 mg) by mouth 2 times daily Reported on 5/4/2017       diclofenac 1 % Gel topical gel    VOLTAREN    100 g    Apply 2 g topically 2 times daily       eltrombopag 50 MG tablet    PROMACTA    90 tablet    Take 3 tablets (150 mg) by mouth daily Administer on an empty stomach, 1 hour before or 2 hours after a meal. Or as directed       fluticasone 50 MCG/ACT spray    FLONASE    1 Bottle    Spray 1-2 sprays into both nostrils daily       loratadine 10 MG tablet    CLARITIN     Take 10 mg by mouth daily as needed Reported on 4/26/2017       ondansetron 4 MG ODT tab    ZOFRAN-ODT    20 tablet    Take 1 tablet (4 mg) by mouth every 6 hours as needed for nausea       order for DME     1 Box    Equipment being ordered: knee high compression stockings- 18-20 mm       oxyCODONE 5 MG IR tablet    ROXICODONE    50 tablet    Take 1 tablet (5 mg) by mouth every 6 hours as needed for moderate to severe pain       pantoprazole 40 MG EC tablet    PROTONIX    60 tablet    Take 1 tablet (40 mg) by mouth 2 times daily       polyethylene glycol Packet    MIRALAX/GLYCOLAX    14 packet    Take 17 g by mouth daily as needed for constipation . If you start to have loose stools/diarrhea or multiple bowel movements,  ok to hold this medication. Then resume if no bowel movement after 24-48hours.       pseudoePHEDrine 120 MG 12 hr tablet    SUDAFED     Take 120 mg by mouth every 12 hours       SENNA S 8.6-50 MG per tablet   Generic drug:  senna-docusate     100 tablet    Can take 1-2 tablets every other day       sucralfate 1 GM/10ML suspension    CARAFATE    1200 mL    Take 10 mLs (1 g) by mouth 2 times daily as needed       TYLENOL PO      Take 325 mg by mouth every 6 hours as needed for mild pain or fever       * Notice:  This list has 2 medication(s) that are the same as other medications prescribed for you. Read the directions carefully, and ask your doctor or other care provider to review them with you.

## 2017-05-31 NOTE — PROGRESS NOTES
"HPI: 72 yo female with aplastic anemia here for L ear \"clicking\"  She can hear this even when not moving her jaw  Sometimes can hear this with talking or swallowing  This is not assoc with pain  R ear feels plugged  She has a little runny nose and nasal congestion  Denies itchy or watery eyes  She is taking some sudafed for her allergies that she does have  She does occas take one benadryl at bedtime.    The hematologist would like her to wait for her platelets to come up before having her thyroid biopsy for the nodule that was found.    Past Medical History:   Diagnosis Date     Allergic rhinitis due to other allergen      Aplastic anemia (H) 2017     Closed anterior dislocation of humerus      DVT of lower extremity (deep venous thrombosis) (H) 10-12    Left after prolonged sitting-plane     DVT, recurrent, lower extremity, acute (H)      Headache(784.0)      Osteoporosis, unspecified      Pulmonary emboli (H) 10-12     Sensorineural hearing loss, unspecified      Sprain of ankle, unspecified site     L     Past Surgical History:   Procedure Laterality Date     ARTHRODESIS FOOT  11    Hallux valgus R-1st MP joint     BLEPHAROPLASTY BILATERAL  ,      BONE MARROW BIOPSY, BONE SPECIMEN, NEEDLE/TROCAR N/A 2016    Procedure: BIOPSY BONE MARROW;  Surgeon: Mu Morgan MD;  Location: [facility]     BONE MARROW BIOPSY, BONE SPECIMEN, NEEDLE/TROCAR N/A 2016    Procedure: BIOPSY BONE MARROW;  Surgeon: Mu Morgan MD;  Location: [facility]     C DEXA INTERPRETATION, AXIAL  8-20-03     C NONSPECIFIC PROCEDURE           C NONSPECIFIC PROCEDURE      hysterectomy/BSO     C NONSPECIFIC PROCEDURE      myomectomy (fibroids)     CATARACT IOL, RT/LT Right      CATARACT IOL, RT/LT Left      EXCHANGE INTRAOCULAR LENS IMPLANT Right 2015    Procedure: EXCHANGE INTRAOCULAR LENS IMPLANT;  Surgeon: Garrett Dawson MD;  Location: [facility] COLONOSCOPY THRU STOMA, " DIAGNOSTIC  5-03    normal- minimal diverticulosis     PICC INSERTION Left 1/9/2017    5fr DL BioFlo PICC, 42cm (2cm external) in the L basilic vein w/ tip in the  SVC RA junction.     VITRECTOMY PARSPLANA WITH 23 GAUGE SYSTEM Right 2/2/2015    Procedure: VITRECTOMY PARSPLANA WITH 23 GAUGE SYSTEM;  Surgeon: Racheal Loyd MD;  Location: [facility]     Social History   Substance Use Topics     Smoking status: Former Smoker     Quit date: 5/14/1973     Smokeless tobacco: Never Used     Alcohol use No      Comment: occasionally     Current Outpatient Prescriptions   Medication Sig Dispense Refill     fluticasone (FLONASE) 50 MCG/ACT spray Spray 1-2 sprays into both nostrils daily 1 Bottle 11     pantoprazole (PROTONIX) 40 MG EC tablet Take 1 tablet (40 mg) by mouth 2 times daily 60 tablet 3     senna-docusate (SENNA S) 8.6-50 MG per tablet Can take 1-2 tablets every other day 100 tablet 1     cycloSPORINE modified (GENERIC EQUIVALENT) 100 MG capsule Take 1 capsule (100 mg) by mouth 2 times daily Reported on 5/4/2017 120 capsule      polyethylene glycol (MIRALAX/GLYCOLAX) Packet Take 17 g by mouth daily as needed for constipation . If you start to have loose stools/diarrhea or multiple bowel movements, ok to hold this medication. Then resume if no bowel movement after 24-48hours. 14 packet 0     sucralfate (CARAFATE) 1 GM/10ML suspension Take 10 mLs (1 g) by mouth 2 times daily as needed 1200 mL 3     pseudoePHEDrine (SUDAFED) 120 MG 12 hr tablet Take 120 mg by mouth every 12 hours       ondansetron (ZOFRAN-ODT) 4 MG ODT tab Take 1 tablet (4 mg) by mouth every 6 hours as needed for nausea 20 tablet 1     oxyCODONE (ROXICODONE) 5 MG IR tablet Take 1 tablet (5 mg) by mouth every 6 hours as needed for moderate to severe pain 50 tablet 0     cycloSPORINE modified (GENERIC EQUIVALENT) 25 MG capsule Take 75 mg by mouth 2 times daily  300 capsule 3     eltrombopag (PROMACTA) 50 MG tablet Take 3 tablets (150 mg) by mouth  "daily Administer on an empty stomach, 1 hour before or 2 hours after a meal. Or as directed 90 tablet 6     diclofenac (VOLTAREN) 1 % GEL topical gel Apply 2 g topically 2 times daily 100 g 1     diphenhydrAMINE (BENADRYL ALLERGY) 25 MG tablet Take 1 tablet (25 mg) by mouth nightly as needed 56 tablet      Acetaminophen (TYLENOL PO) Take 325 mg by mouth every 6 hours as needed for mild pain or fever       ORDER FOR DME Equipment being ordered: knee high compression stockings- 18-20 mm 1 Box 1     loratadine (CLARITIN) 10 MG tablet Take 10 mg by mouth daily as needed Reported on 4/26/2017       chlorpheniramine (CHLOR-TRIMETON) 4 MG tablet Take 4 mg by mouth every 6 hours as needed. For head cold        COMPRESSION STOCKINGS Wear compression stockings at 20-30 mmHg rating most time during the day to the affected leg (left leg) or both legs. Take them off at night. 2 each 2     Allergies   Allergen Reactions     Cats      Dogs      No Known Drug Allergies      Seasonal Allergies      FAMILY HISTORY NOTED AND REVIEWED    PHYSICAL EXAM:    /73 (BP Location: Right arm, Cuff Size: Adult Regular)  Pulse 96  Temp 97.8  F (36.6  C) (Oral)  Ht 5' 2.99\" (1.6 m)  Wt 121 lb 14.4 oz (55.3 kg)  SpO2 95%  BMI 21.6 kg/m2    Patient appears non toxic  Ears: bilat thick cerumen removed with cerumen spoon with some improvement on the R only  TMs pearly grey, no effusions, no erythema of TM  Nose: narrow nostrils    Assessment and Plan:     (J31.0,  T48.5X1A) Rhinitis medicamentosa  (primary encounter diagnosis)  Comment: KANDI afrin nasal spray and explained this can cause rebound congestion. Start flonase 2 psrays at bedtime  Plan: fluticasone (FLONASE) 50 MCG/ACT spray            (H61.23) Bilateral impacted cerumen  Comment:   Plan: Irrig done.  She may have some hearing loss from ETD so hopefully the flonase helps with that.            (R09.81) Nasal congestion  Comment:   Plan: fluticasone (FLONASE) 50 MCG/ACT spray        "     (D61.3) Idiopathic aplastic anemia (H)  Comment:   Plan: per hematology    (E04.1) Thyroid nodule  Comment:   Plan: she will need to wait for labs to improve before having the thyroid nodule biopsy      Shivani Phelps PA-C

## 2017-05-31 NOTE — PATIENT INSTRUCTIONS
Stop using the the Afrin. This causes rebound congestion.  Start flonase nasal spray 2 sprays at bedtime.

## 2017-05-31 NOTE — PROGRESS NOTES
Infusion Nursing Note:  Bonny Raymundo presents today for labs/possible transfusion  Patient seen by provider today: No   present during visit today: Not Applicable.    Note: N/A.    Intravenous Access:  PICC.    Treatment Conditions:  Lab Results   Component Value Date    HGB 8.0 05/31/2017     Lab Results   Component Value Date    WBC 3.9 05/31/2017      Lab Results   Component Value Date    ANEU 2.1 05/31/2017     Lab Results   Component Value Date    PLT 36 05/31/2017      Lab Results   Component Value Date     05/31/2017                   Lab Results   Component Value Date    POTASSIUM 4.4 05/31/2017           Lab Results   Component Value Date    MAG 1.7 05/17/2017            Lab Results   Component Value Date    CR 1.23 05/31/2017                   Lab Results   Component Value Date    LEO 8.6 05/31/2017                Lab Results   Component Value Date    BILITOTAL 2.1 05/31/2017           Lab Results   Component Value Date    ALBUMIN 3.2 05/31/2017                    Lab Results   Component Value Date    ALT 21 05/31/2017           Lab Results   Component Value Date    AST 15 05/31/2017     Results reviewed, labs did NOT meet treatment parameters: for blood transfusion.      Post Infusion Assessment:  Site patent and intact, free from redness, edema or discomfort.    Discharge Plan:   Patient and/or family verbalized understanding of discharge instructions and all questions answered.  Copy of AVS reviewed with patient and/or family.  Patient will return one week for next appointment.  Patient discharged in stable condition accompanied by: rachel Thomas RN

## 2017-05-31 NOTE — MR AVS SNAPSHOT
After Visit Summary   5/31/2017    Bonny Raymundo    MRN: [id number]           Patient Information     Date Of Birth          1945        Visit Information        Provider Department      5/31/2017 8:00 AM SH INFUSION CHAIR 14 [facility]        Today's Diagnoses     Idiopathic aplastic anemia (H)        Pancytopenia (H)           Follow-ups after your visit        Your next 10 appointments already scheduled     May 31, 2017  1:00 PM CDT   Office Visit with Shivani Phelps PA-C   [facility]    [address] [facility] [address]   [phone]           Bring a current list of meds and any records pertaining to this visit.  For Physicals, please bring immunization records and any forms needing to be filled out.  Please arrive 10 minutes early to complete paperwork.            Jun 01, 2017  4:00 PM CDT   RETURN ONC with Rafita Rogel MD   [facility] ([facility])    [address] [place] [id number] [phone]            Jun 07, 2017  8:30 AM CDT   Level 5 with  INFUSION CHAIR 18   [facility] ([facility])    [facility]  [address]  [facility] [address]   [phone]            Jun 14, 2017  9:00 AM CDT   Level 5 with SH INFUSION CHAIR 14   [facility] ([facility])    [facility]  [address]  [facility] [address]   [phone]            Jun 21, 2017  9:00 AM CDT   Level 5 with  INFUSION CHAIR 12   [facility] ([facility])    [facility]  [address]  [facility] [address]   [phone]            Jun 28, 2017  9:00 AM CDT   Level 5 with SH INFUSION CHAIR 12   [facility] ([facility])    [facility] [place]  [address]  [place] [address] [phone]              Who to contact     If you have questions or need follow up information about today's clinic visit or your schedule please contact [facility] directly at [phone].  Normal or non-critical lab and imaging results will be communicated to you by MyChart, letter or phone within 4 business days after the clinic has received the results. If you do not hear from us within 7 days, please contact the clinic through [employer] or phone. If you have a critical or abnormal lab result, we will notify you by phone as soon as possible.  Submit refill requests through [employer] or call your pharmacy and they will forward the refill request to us. Please allow 3 business days for your refill to be completed.          Additional Information About Your Visit        [employer] Information     [employer] lets you send messages to your doctor, view your test results, renew your prescriptions, schedule appointments and more. To sign up, go to [email or web address] . Click on \"Log in\" on the left side of the screen, which will take you to the Welcome page. Then click on \"Sign up Now\" on the right side of the page.     You will be asked to enter the access code listed below, as well as some personal information. Please follow the directions to create your username and password.     Your access code is: [email or web address]  Expires: 2017  1:59 PM     Your access code will  in 90 days. If you need help or a new code, please call your [place] clinic or [phone].        Care EveryWhere ID     This is your Care EveryWhere ID. This could be used by other organizations to access your [place] medical records  [id number]        Your Vitals Were     Pulse Temperature Respirations             95 97.5  F (36.4  C) (Oral) 20          Blood Pressure from Last 3 " Encounters:   05/31/17 122/78   05/24/17 128/63   05/08/17 116/79    Weight from Last 3 Encounters:   05/08/17 58.3 kg (128 lb 9.6 oz)   05/04/17 58.2 kg (128 lb 4.9 oz)   04/24/17 58.7 kg (129 lb 4.8 oz)              We Performed the Following     CBC with platelets differential     Comprehensive metabolic panel     Cyclosporine        Primary Care Provider Office Phone # Fax #    Shivani Phelps PA-C [phone]       [facility] [address]  [facility] [address]        Thank you!     Thank you for choosing [facility]  for your care. Our goal is always to provide you with excellent care. Hearing back from our patients is one way we can continue to improve our services. Please take a few minutes to complete the written survey that you may receive in the mail after your visit with us. Thank you!             Your Updated Medication List - Protect others around you: Learn how to safely use, store and throw away your medicines at www.disposemymeds.org.          This list is accurate as of: 5/31/17  9:58 AM.  Always use your most recent med list.                   Brand Name Dispense Instructions for use    BENADRYL ALLERGY 25 MG tablet   Generic drug:  diphenhydrAMINE     56 tablet    Take 1 tablet (25 mg) by mouth nightly as needed       chlorpheniramine 4 MG tablet    CHLOR-TRIMETON     Take 4 mg by mouth every 6 hours as needed. For head cold       COMPRESSION STOCKINGS     2 each    Wear compression stockings at 20-30 mmHg rating most time during the day to the affected leg (left leg) or both legs. Take them off at night.       * cycloSPORINE modified 25 MG capsule    GENERIC EQUIVALENT    300 capsule    Take 75 mg by mouth 2 times daily       * cycloSPORINE modified 100 MG capsule    GENERIC EQUIVALENT    120 capsule    Take 1 capsule (100 mg) by mouth 2 times daily Reported on 5/4/2017       diclofenac 1 % Gel topical gel    VOLTAREN    100 g     Apply 2 g topically 2 times daily       eltrombopag 50 MG tablet    PROMACTA    90 tablet    Take 3 tablets (150 mg) by mouth daily Administer on an empty stomach, 1 hour before or 2 hours after a meal. Or as directed       loratadine 10 MG tablet    CLARITIN     Take 10 mg by mouth daily as needed Reported on 4/26/2017       ondansetron 4 MG ODT tab    ZOFRAN-ODT    20 tablet    Take 1 tablet (4 mg) by mouth every 6 hours as needed for nausea       order for DME     1 Box    Equipment being ordered: knee high compression stockings- 18-20 mm       oxyCODONE 5 MG IR tablet    ROXICODONE    50 tablet    Take 1 tablet (5 mg) by mouth every 6 hours as needed for moderate to severe pain       pantoprazole 40 MG EC tablet    PROTONIX    60 tablet    Take 1 tablet (40 mg) by mouth 2 times daily       polyethylene glycol Packet    MIRALAX/GLYCOLAX    14 packet    Take 17 g by mouth daily as needed for constipation . If you start to have loose stools/diarrhea or multiple bowel movements, ok to hold this medication. Then resume if no bowel movement after 24-48hours.       pseudoePHEDrine 120 MG 12 hr tablet    SUDAFED     Take 120 mg by mouth every 12 hours       SENNA S 8.6-50 MG per tablet   Generic drug:  senna-docusate     100 tablet    Can take 1-2 tablets every other day       sucralfate 1 GM/10ML suspension    CARAFATE    1200 mL    Take 10 mLs (1 g) by mouth 2 times daily as needed       TYLENOL PO      Take 325 mg by mouth every 6 hours as needed for mild pain or fever       * Notice:  This list has 2 medication(s) that are the same as other medications prescribed for you. Read the directions carefully, and ask your doctor or other care provider to review them with you.

## 2017-05-31 NOTE — NURSING NOTE
"Chief Complaint   Patient presents with     Ear Problem     Forms     Health Care Quality Patient Assessment       Initial /73 (BP Location: Right arm, Cuff Size: Adult Regular)  Pulse 96  Temp 97.8  F (36.6  C) (Oral)  Ht 5' 2.99\" (1.6 m)  Wt 121 lb 14.4 oz (55.3 kg)  SpO2 95%  BMI 21.6 kg/m2 Estimated body mass index is 21.6 kg/(m^2) as calculated from the following:    Height as of this encounter: 5' 2.99\" (1.6 m).    Weight as of this encounter: 121 lb 14.4 oz (55.3 kg).  Medication Reconciliation: complete       Tran Burnette CMA      "

## 2017-06-01 ENCOUNTER — OFFICE VISIT (OUTPATIENT)
Dept: TRANSPLANT | Facility: CLINIC | Age: 72
End: 2017-06-01
Attending: INTERNAL MEDICINE
Payer: COMMERCIAL

## 2017-06-01 VITALS
OXYGEN SATURATION: 96 % | SYSTOLIC BLOOD PRESSURE: 131 MMHG | DIASTOLIC BLOOD PRESSURE: 89 MMHG | RESPIRATION RATE: 18 BRPM | TEMPERATURE: 98.1 F | HEART RATE: 105 BPM | WEIGHT: 122.5 LBS | BODY MASS INDEX: 21.71 KG/M2

## 2017-06-01 DIAGNOSIS — J30.1 SEASONAL ALLERGIC RHINITIS DUE TO POLLEN: ICD-10-CM

## 2017-06-01 DIAGNOSIS — D61.3 IDIOPATHIC APLASTIC ANEMIA (H): Primary | ICD-10-CM

## 2017-06-01 PROCEDURE — 99213 OFFICE O/P EST LOW 20 MIN: CPT | Mod: ZF

## 2017-06-01 RX ORDER — FLUTICASONE PROPIONATE 50 MCG
1-2 SPRAY, SUSPENSION (ML) NASAL DAILY
Qty: 1 BOTTLE | Refills: 3 | Status: SHIPPED | OUTPATIENT
Start: 2017-06-01 | End: 2017-07-13

## 2017-06-01 ASSESSMENT — PAIN SCALES - GENERAL: PAINLEVEL: NO PAIN (1)

## 2017-06-01 NOTE — MR AVS SNAPSHOT
After Visit Summary   6/1/2017    Bonny Raymundo    MRN: [id number]           Patient Information     Date Of Birth          1945        Visit Information        Provider Department      6/1/2017 4:00 PM Rafita Rogel MD [facility]        Today's Diagnoses     Idiopathic aplastic anemia (H)    -  1    Seasonal allergic rhinitis due to pollen              [facility] [place] [place]  [address] [place] [id number]  Phone: [phone]  Clinic Hours:   Monday-Thursday:7am to 7pm   Friday: 7am to 5pm   Weekends and holidays:    8am to noon (in general)  If your fever is 100.5  or greater,   call the clinic.  After hours call the   hospital at [phone] or   [phone]. Ask for the BMT   fellow on-call            Follow-ups after your visit        Follow-up notes from your care team     Return for Physical Exam, Lab Work, Routine Visit.      Your next 10 appointments already scheduled     Jun 07, 2017  8:30 AM CDT   Level 5 with SH INFUSION CHAIR 18   [facility] ([facility])    [facility]  [address]  [place] [address]   [phone]            Jun 14, 2017  9:00 AM CDT   Level 5 with SH INFUSION CHAIR 14   [facility] ([facility])    [facility]  [address]  [place] [address]   [phone]            Jun 21, 2017  9:00 AM CDT   Level 5 with SH INFUSION CHAIR 12   [facility] ([facility])    [facility]  [address]  [place] [address]   [phone]            Jun 28, 2017  9:00 AM CDT   Level 5 with SH INFUSION CHAIR 12   [facility] ([facility])    [facility]  [address]  [place] [address]  "  [phone]              Future tests that were ordered for you today     Open Future Orders        Priority Expected Expires Ordered    CBC with platelets differential Routine 6/17/2017 7/8/2017 6/1/2017    Comprehensive metabolic panel Routine 6/17/2017 7/8/2017 6/1/2017    Cyclosporine Routine 6/17/2017 7/8/2017 6/1/2017    CBC with platelets differential Routine 6/2/2017 6/30/2017 6/1/2017    Basic metabolic panel Routine 6/2/2017 6/30/2017 6/1/2017    Cyclosporine Routine 6/2/2017 6/30/2017 6/1/2017    Reticulocyte count Routine 6/2/2017 6/30/2017 6/1/2017    TSH with free T4 reflex Routine 6/2/2017 6/30/2017 6/1/2017            Who to contact     If you have questions or need follow up information about today's clinic visit or your schedule please contact [facility] directly at [phone].  Normal or non-critical lab and imaging results will be communicated to you by [employer], letter or phone within 4 business days after the clinic has received the results. If you do not hear from us within 7 days, please contact the clinic through [employer] or phone. If you have a critical or abnormal lab result, we will notify you by phone as soon as possible.  Submit refill requests through [employer] or call your pharmacy and they will forward the refill request to us. Please allow 3 business days for your refill to be completed.          Additional Information About Your Visit        [employer] Information     [employer] lets you send messages to your doctor, view your test results, renew your prescriptions, schedule appointments and more. To sign up, go to [email or web address] . Click on \"Log in\" on the left side of the screen, which will take you to the Welcome page. Then click on \"Sign up Now\" on the right side of the page.     You will be asked to enter the access code listed below, as well as some personal information. Please follow the directions to create your username and password.     Your " access code is: [email or web address]  Expires: 2017  1:59 PM     Your access code will  in 90 days. If you need help or a new code, please call your [facility] clinic or [phone].        Care EveryWhere ID     This is your Care EveryWhere ID. This could be used by other organizations to access your [facility] medical records  [id number]        Your Vitals Were     Pulse Temperature Respirations Pulse Oximetry BMI (Body Mass Index)       105 98.1  F (36.7  C) (Oral) 18 96% 21.71 kg/m2        Blood Pressure from Last 3 Encounters:   17 131/89   17 115/73   17 122/78    Weight from Last 3 Encounters:   17 55.6 kg (122 lb 8 oz)   17 55.3 kg (121 lb 14.4 oz)   17 58.3 kg (128 lb 9.6 oz)                 Today's Medication Changes          These changes are accurate as of: 17 11:59 PM.  If you have any questions, ask your nurse or doctor.               Start taking these medicines.        Dose/Directions    carbamide peroxide 6.5 % otic solution   Commonly known as:  DEBROX   Used for:  Idiopathic aplastic anemia (H), Seasonal allergic rhinitis due to pollen   Started by:  Rafita Rogel MD        Dose:  5-10 drop   Place 5-10 drops into the right ear 2 times daily   Quantity:  30 mL   Refills:  0         These medicines have changed or have updated prescriptions.        Dose/Directions    * fluticasone 50 MCG/ACT spray   Commonly known as:  FLONASE   This may have changed:  Another medication with the same name was added. Make sure you understand how and when to take each.   Used for:  Rhinitis medicamentosa, Bilateral impacted cerumen, Nasal congestion   Changed by:  Shivani Phelps PA-C        Dose:  1-2 spray   Spray 1-2 sprays into both nostrils daily   Quantity:  1 Bottle   Refills:  11       * fluticasone 50 MCG/ACT spray   Commonly known as:  FLONASE   This may have changed:  You were already taking a medication with the same name, and this prescription was  added. Make sure you understand how and when to take each.   Used for:  Idiopathic aplastic anemia (H), Seasonal allergic rhinitis due to pollen   Changed by:  Rafita Rogel MD        Dose:  1-2 spray   Spray 1-2 sprays into both nostrils daily   Quantity:  1 Bottle   Refills:  3       * Notice:  This list has 2 medication(s) that are the same as other medications prescribed for you. Read the directions carefully, and ask your doctor or other care provider to review them with you.         Where to get your medicines      These medications were sent to [facility] [place], [place] - [address]  [address], [place] [address]     Phone:  [phone]     carbamide peroxide 6.5 % otic solution    fluticasone 50 MCG/ACT spray                Recent Review Flowsheet Data     BMT Recent Results Latest Ref Rng & Units 4/29/2017 5/3/2017 5/10/2017 5/17/2017 5/24/2017 5/24/2017 5/31/2017    WBC 4.0 - 11.0 10e9/L 4.2 3.8(L) 4.2 3.5(L) 2.8(L) - 3.9(L)    Hemoglobin 11.7 - 15.7 g/dL 8.9(L) 8.7(L) 8.2(L) 8.2(L) 8.1(L) - 8.0(L)    Platelet Count 150 - 450 10e9/L 26(LL) 50(L) 31(LL) 35(LL) 33(LL) - 36(LL)    Neutrophils (Absolute) 1.6 - 8.3 10e9/L 1.5(L) 1.5(L) 1.9 1.5(L) 1.0(L) - 2.1    Sodium 133 - 144 mmol/L - 142 141 139 138 - 142    Potassium 3.4 - 5.3 mmol/L - 3.7 3.9 5.8(H) 6.2(HH) 3.8 4.4    Chloride 94 - 109 mmol/L - 108 107 108 109 - 110(H)    Glucose 70 - 99 mg/dL - 120(H) 125(H) 119(H) 125(H) - 118(H)    Urea Nitrogen 7 - 30 mg/dL - 32(H) 31(H) 28 29 - 31(H)    Creatinine 0.52 - 1.04 mg/dL - 1.06(H) 1.07(H) 1.04 1.09(H) - 1.23(H)    Calcium (Total) 8.5 - 10.1 mg/dL - 8.7 8.8 8.7 8.4(L) - 8.6    Protein (Total) 6.8 - 8.8 g/dL - 7.1 7.1 7.1 6.9 - 7.0    Albumin 3.4 - 5.0 g/dL - 3.3(L) 3.2(L) 3.2(L) 3.2(L) - 3.2(L)    Alkaline Phosphatase 40 - 150 U/L - 73 77 91 86 - 89    AST 0 - 45 U/L - 13 13 30 34 - 15    ALT 0 - 50 U/L - 22 19 25 23 - 21    MCV 78 - 100 fl 107(H) 108(H) 110(H)  114(H) 115(H) - 115(H)               Primary Care Provider Office Phone # Fax #    Shivani Phelps PA-C [phone]       [facility] [address] [facility] [address]  [facility] [address]        Thank you!     Thank you for choosing [facility]  for your care. Our goal is always to provide you with excellent care. Hearing back from our patients is one way we can continue to improve our services. Please take a few minutes to complete the written survey that you may receive in the mail after your visit with us. Thank you!             Your Updated Medication List - Protect others around you: Learn how to safely use, store and throw away your medicines at www.disposemymeds.org.          This list is accurate as of: 6/1/17 11:59 PM.  Always use your most recent med list.                   Brand Name Dispense Instructions for use    BENADRYL ALLERGY 25 MG tablet   Generic drug:  diphenhydrAMINE     56 tablet    Take 1 tablet (25 mg) by mouth nightly as needed       carbamide peroxide 6.5 % otic solution    DEBROX    30 mL    Place 5-10 drops into the right ear 2 times daily       chlorpheniramine 4 MG tablet    CHLOR-TRIMETON     Take 4 mg by mouth every 6 hours as needed. For head cold       COMPRESSION STOCKINGS     2 each    Wear compression stockings at 20-30 mmHg rating most time during the day to the affected leg (left leg) or both legs. Take them off at night.       * cycloSPORINE modified 25 MG capsule    GENERIC EQUIVALENT    300 capsule    Take 75 mg by mouth 2 times daily       * cycloSPORINE modified 100 MG capsule    GENERIC EQUIVALENT    120 capsule    Take 1 capsule (100 mg) by mouth 2 times daily Reported on 5/4/2017       diclofenac 1 % Gel topical gel    VOLTAREN    100 g    Apply 2 g topically 2 times daily       eltrombopag 50 MG tablet    PROMACTA    90 tablet    Take 3 tablets (150 mg) by mouth daily Administer on an empty stomach, 1 hour before or 2 hours after  a meal. Or as directed       * fluticasone 50 MCG/ACT spray    FLONASE    1 Bottle    Spray 1-2 sprays into both nostrils daily       * fluticasone 50 MCG/ACT spray    FLONASE    1 Bottle    Spray 1-2 sprays into both nostrils daily       loratadine 10 MG tablet    CLARITIN     Take 10 mg by mouth daily as needed Reported on 4/26/2017       ondansetron 4 MG ODT tab    ZOFRAN-ODT    20 tablet    Take 1 tablet (4 mg) by mouth every 6 hours as needed for nausea       order for DME     1 Box    Equipment being ordered: knee high compression stockings- 18-20 mm       oxyCODONE 5 MG IR tablet    ROXICODONE    50 tablet    Take 1 tablet (5 mg) by mouth every 6 hours as needed for moderate to severe pain       pantoprazole 40 MG EC tablet    PROTONIX    60 tablet    Take 1 tablet (40 mg) by mouth 2 times daily       polyethylene glycol Packet    MIRALAX/GLYCOLAX    14 packet    Take 17 g by mouth daily as needed for constipation . If you start to have loose stools/diarrhea or multiple bowel movements, ok to hold this medication. Then resume if no bowel movement after 24-48hours.       pseudoePHEDrine 120 MG 12 hr tablet    SUDAFED     Take 120 mg by mouth every 12 hours       SENNA S 8.6-50 MG per tablet   Generic drug:  senna-docusate     100 tablet    Can take 1-2 tablets every other day       sucralfate 1 GM/10ML suspension    CARAFATE    1200 mL    Take 10 mLs (1 g) by mouth 2 times daily as needed       TYLENOL PO      Take 325 mg by mouth every 6 hours as needed for mild pain or fever       * Notice:  This list has 4 medication(s) that are the same as other medications prescribed for you. Read the directions carefully, and ask your doctor or other care provider to review them with you.

## 2017-06-01 NOTE — PROGRESS NOTES
/89 (BP Location: Right arm, Patient Position: Chair, Cuff Size: Adult Regular)  Pulse 105  Temp 98.1  F (36.7  C) (Oral)  Resp 18  Wt 55.6 kg (122 lb 8 oz)  SpO2 96%  BMI 21.71 kg/m2  Wt Readings from Last 4 Encounters:   06/01/17 55.6 kg (122 lb 8 oz)   05/31/17 55.3 kg (121 lb 14.4 oz)   05/08/17 58.3 kg (128 lb 9.6 oz)   05/04/17 58.2 kg (128 lb 4.9 oz)     HISTORY OF PRESENT ILLNESS:  Bonny returns to follow up her severe aplastic anemia.  She received ATG, steroids and cyclosporine last January and is now only taking cyclosporine and eltrombopag.  She has had no red cell transfusions in 5 weeks, the longest interval since the immunosuppressive treatment began.  She has had no recent fever, chills or infection.  She had some mild bruises but no external bleeding.      Her ears have felt plugged, particularly the right and she saw her primary care provider yesterday and some ear cleaning was done with a cerumen spoon, but she still has quite a bit of plugged wax on the right, plus some external canal irritation from the procedure.  She also has a slightly stuffy nose, more the right than the left, but no sinus pain or ear pain.      She still had some difficulty swallowing but it is not worsening.  She does not have a great appetite but is able to eat, though she has lost 10-15 pounds over the 6 months; 5 pounds over the past month.  It is a little hard to assess since she was overhydrated, slightly edematous and taking corticosteroids which might have increased her prior weight since January but we will have to pay attention to her overall weight, nutrition and  sense of energy.      She still does not feel ill when her hemoglobin is down to the level of 8 and has no compromise in her energy.  She has no other GI or  symptoms.  She does describe a few brief and transient bouts of diarrhea over the last 2 months, which resolved spontaneously.  She is not taking Carafate or Senna regularly but gets  some relief with ondansetron.  The rest of the complete review of systems is unrevealing.      PHYSICAL EXAMINATION:   VITAL SIGNS:  Her weight has her down to 2.5 kg over the month of May and her vital signs are otherwise acceptable.   SKIN:  She has no rash or petechiae but has a few bruises in areas she thinks she has had minor trauma.   HEENT:  Her oropharynx is clear with a little redness on the posterior tonsillar pillars, a bit more on the right than the left, but no tonsillar enlargement or exudate.  Her nose is stuffed, more on the left than the right, though she feels like it is more stuffed on the right.  Her external auditory canal on the left is clear and the drum is clear.  On the right, the ear drum is fully obscured by wax and there is a little erosion on the posterior part of the external auditory canal, likely from yesterday's cerumen procedure.   NECK:  No thyromegaly or palpable thyroid nodule.   LUNGS:  Clear without rales or wheezing.   CARDIOVASCULAR:  Heart tones are regular.  There is no gallop, and she has a soft ejection murmur.   ABDOMEN:  Soft and nontender.  She has no palpable hepatosplenomegaly, masses or focal tenderness.      LABORATORY:  Laboratory evaluation done yesterday showed her creatinine up just a bit, but it is about the same as it was in March and April.  Her hemoglobin is 8.  Her white count is the best it has been in months and her platelet count is 36,000.  Her platelets have been in the 30,000 or higher range all through May without transfusion support.  A reticulocyte count done several weeks ago showed 57,000 reticulocytes, higher than it was at the beginning of her treatment.      Her primary care provider had some followup imaging and a chest CT scan showed resolution of previous patchy infiltrates in a comparison from last September and it no longer shows the right lower lobe, tiny lung nodule and there are no other lung abnormalities.  She still has a thyroid  nodule.  A thyroid ultrasound was done to follow up and she does indeed have a small left lower lobe of the thyroid hypodense nodule measuring 1.8 x 1.1 x 1.9 cm.      Previous biochemical assessment done years ago showed normal thyroid function and we will repeat that at her next visit.  Since she has blood drawn frequently, that can wait.        ASSESSMENT/PLAN:  Her severe aplastic anemia is doing well.  It is now in the moderate range, and we will reduce her transfusion threshold to 7.5 grams to see if we can avoid red cell transfusions.  We will leave her platelet transfusion threshold at 20,000, and she has not had infections and her neutrophil count is higher, so we will make no further changes.  Cyclosporine level done yesterday was 217 and we will make no change in her current dose of 125 mg twice daily.  She will also continue on eltrombopag currently 150 mg daily.      She has a thyroid nodule, but she has a low platelet count and it would be inadvisable to pursue this with a biopsy at this point, particularly since her thyroid function has not been assessed.  There is no urgency in evaluating this thyroid nodule.  It is not likely in any way connected to her bit of trouble swallowing, at least in my view.     She was given flonase for her stuffy nose and debrox for her wax plugged R ear.     We will now change her blood count monitoring to every 2 weeks which she will have done at [facility] with transfusion at a hemoglobin of 7.5 or less and platelets of 20,000 or less and I will see her again here at the [place] in 4 weeks' time.     Rafita Rogel MD  Professor of Medicine    Results for JL LISSETH M (MRN [id number]) as of 6/2/2017 11:58   Ref. Range 5/31/2017 08:40   Sodium Latest Ref Range: 133 - 144 mmol/L 142   Potassium Latest Ref Range: 3.4 - 5.3 mmol/L 4.4   Chloride Latest Ref Range: 94 - 109 mmol/L 110 (H)   Carbon Dioxide Latest Ref Range: 20 - 32 mmol/L 23   Urea Nitrogen Latest Ref  Range: 7 - 30 mg/dL 31 (H)   Creatinine Latest Ref Range: 0.52 - 1.04 mg/dL 1.23 (H)   GFR Estimate Latest Ref Range: >60 mL/min/1.7m2 43 (L)   GFR Estimate If Black Latest Ref Range: >60 mL/min/1.7m2 52 (L)   Calcium Latest Ref Range: 8.5 - 10.1 mg/dL 8.6   Anion Gap Latest Ref Range: 3 - 14 mmol/L 9   Albumin Latest Ref Range: 3.4 - 5.0 g/dL 3.2 (L)   Protein Total Latest Ref Range: 6.8 - 8.8 g/dL 7.0   Bilirubin Total Latest Ref Range: 0.2 - 1.3 mg/dL 2.1 (H)   Alkaline Phosphatase Latest Ref Range: 40 - 150 U/L 89   ALT Latest Ref Range: 0 - 50 U/L 21   AST Latest Ref Range: 0 - 45 U/L 15   Glucose Latest Ref Range: 70 - 99 mg/dL 118 (H)   WBC Latest Ref Range: 4.0 - 11.0 10e9/L 3.9 (L)   Hemoglobin Latest Ref Range: 11.7 - 15.7 g/dL 8.0 (L)   Hematocrit Latest Ref Range: 35.0 - 47.0 % 23.2 (L)   Platelet Count Latest Ref Range: 150 - 450 10e9/L 36 (LL)   RBC Count Latest Ref Range: 3.8 - 5.2 10e12/L 2.02 (L)   MCV Latest Ref Range: 78 - 100 fl 115 (H)   MCH Latest Ref Range: 26.5 - 33.0 pg 39.6 (H)   MCHC Latest Ref Range: 31.5 - 36.5 g/dL 34.5   RDW Latest Ref Range: 10.0 - 15.0 % 21.6 (H)   Diff Method Unknown Automated Method   % Neutrophils Latest Units: % 54.5   % Lymphocytes Latest Units: % 36.3   % Monocytes Latest Units: % 8.2   % Eosinophils Latest Units: % 1.0   % Basophils Latest Units: % 0.0   % Immature Granulocytes Latest Units: % 0.0   Nucleated RBCs Latest Ref Range: 0 /100 1 (H)   Absolute Neutrophil Latest Ref Range: 1.6 - 8.3 10e9/L 2.1   Absolute Lymphocytes Latest Ref Range: 0.8 - 5.3 10e9/L 1.4   Absolute Monocytes Latest Ref Range: 0.0 - 1.3 10e9/L 0.3   Absolute Eosinophils Latest Ref Range: 0.0 - 0.7 10e9/L 0.0   Absolute Basophils Latest Ref Range: 0.0 - 0.2 10e9/L 0.0   Abs Immature Granulocytes Latest Ref Range: 0 - 0.4 10e9/L 0.0   Absolute Nucleated RBC Unknown 0.0   Cyclosporine Last Dose Unknown Not Provided   Cyclosporine Level Latest Ref Range: 50 - 400 ug/L 217

## 2017-06-01 NOTE — LETTER
6/1/2017      RE: Bonny Raymundo  [address]  [facility] [address]       /89 (BP Location: Right arm, Patient Position: Chair, Cuff Size: Adult Regular)  Pulse 105  Temp 98.1  F (36.7  C) (Oral)  Resp 18  Wt 55.6 kg (122 lb 8 oz)  SpO2 96%  BMI 21.71 kg/m2  Wt Readings from Last 4 Encounters:   06/01/17 55.6 kg (122 lb 8 oz)   05/31/17 55.3 kg (121 lb 14.4 oz)   05/08/17 58.3 kg (128 lb 9.6 oz)   05/04/17 58.2 kg (128 lb 4.9 oz)     HISTORY OF PRESENT ILLNESS:  Bonny returns to follow up her severe aplastic anemia.  She received ATG, steroids and cyclosporine last January and is now only taking cyclosporine and eltrombopag.  She has had no red cell transfusions in 5 weeks, the longest interval since the immunosuppressive treatment began.  She has had no recent fever, chills or infection.  She had some mild bruises but no external bleeding.      Her ears have felt plugged, particularly the right and she saw her primary care provider yesterday and some ear cleaning was done with a cerumen spoon, but she still has quite a bit of plugged wax on the right, plus some external canal irritation from the procedure.  She also has a slightly stuffy nose, more the right than the left, but no sinus pain or ear pain.      She still had some difficulty swallowing but it is not worsening.  She does not have a great appetite but is able to eat, though she has lost 10-15 pounds over the 6 months; 5 pounds over the past month.  It is a little hard to assess since she was overhydrated, slightly edematous and taking corticosteroids which might have increased her prior weight since January but we will have to pay attention to her overall weight, nutrition and  sense of energy.      She still does not feel ill when her hemoglobin is down to the level of 8 and has no compromise in her energy.  She has no other GI or  symptoms.  She does describe a few brief and transient bouts of diarrhea over the last 2 months,  which resolved spontaneously.  She is not taking Carafate or Senna regularly but gets some relief with ondansetron.  The rest of the complete review of systems is unrevealing.      PHYSICAL EXAMINATION:   VITAL SIGNS:  Her weight has her down to 2.5 kg over the month of May and her vital signs are otherwise acceptable.   SKIN:  She has no rash or petechiae but has a few bruises in areas she thinks she has had minor trauma.   HEENT:  Her oropharynx is clear with a little redness on the posterior tonsillar pillars, a bit more on the right than the left, but no tonsillar enlargement or exudate.  Her nose is stuffed, more on the left than the right, though she feels like it is more stuffed on the right.  Her external auditory canal on the left is clear and the drum is clear.  On the right, the ear drum is fully obscured by wax and there is a little erosion on the posterior part of the external auditory canal, likely from yesterday's cerumen procedure.   NECK:  No thyromegaly or palpable thyroid nodule.   LUNGS:  Clear without rales or wheezing.   CARDIOVASCULAR:  Heart tones are regular.  There is no gallop, and she has a soft ejection murmur.   ABDOMEN:  Soft and nontender.  She has no palpable hepatosplenomegaly, masses or focal tenderness.      LABORATORY:  Laboratory evaluation done yesterday showed her creatinine up just a bit, but it is about the same as it was in March and April.  Her hemoglobin is 8.  Her white count is the best it has been in months and her platelet count is 36,000.  Her platelets have been in the 30,000 or higher range all through May without transfusion support.  A reticulocyte count done several weeks ago showed 57,000 reticulocytes, higher than it was at the beginning of her treatment.      Her primary care provider had some followup imaging and a chest CT scan showed resolution of previous patchy infiltrates in a comparison from last September and it no longer shows the right lower lobe,  tiny lung nodule and there are no other lung abnormalities.  She still has a thyroid nodule.  A thyroid ultrasound was done to follow up and she does indeed have a small left lower lobe of the thyroid hypodense nodule measuring 1.8 x 1.1 x 1.9 cm.      Previous biochemical assessment done years ago showed normal thyroid function and we will repeat that at her next visit.  Since she has blood drawn frequently, that can wait.        ASSESSMENT/PLAN:  Her severe aplastic anemia is doing well.  It is now in the moderate range, and we will reduce her transfusion threshold to 7.5 grams to see if we can avoid red cell transfusions.  We will leave her platelet transfusion threshold at 20,000, and she has not had infections and her neutrophil count is higher, so we will make no further changes.  Cyclosporine level done yesterday was 217 and we will make no change in her current dose of 125 mg twice daily.  She will also continue on eltrombopag currently 150 mg daily.      She has a thyroid nodule, but she has a low platelet count and it would be inadvisable to pursue this with a biopsy at this point, particularly since her thyroid function has not been assessed.  There is no urgency in evaluating this thyroid nodule.  It is not likely in any way connected to her bit of trouble swallowing, at least in my view.     She was given flonase for her stuffy nose and debrox for her wax plugged R ear.     We will now change her blood count monitoring to every 2 weeks which she will have done at [facility] with transfusion at a hemoglobin of 7.5 or less and platelets of 20,000 or less and I will see her again here at the [facility] in 4 weeks' time.     Rafita Rogel MD  Professor of Medicine    Results for JL LISSETH M (MRN [id number]) as of 6/2/2017 11:58   Ref. Range 5/31/2017 08:40   Sodium Latest Ref Range: 133 - 144 mmol/L 142   Potassium Latest Ref Range: 3.4 - 5.3 mmol/L 4.4   Chloride Latest Ref Range: 94 - 109 mmol/L 110  (H)   Carbon Dioxide Latest Ref Range: 20 - 32 mmol/L 23   Urea Nitrogen Latest Ref Range: 7 - 30 mg/dL 31 (H)   Creatinine Latest Ref Range: 0.52 - 1.04 mg/dL 1.23 (H)   GFR Estimate Latest Ref Range: >60 mL/min/1.7m2 43 (L)   GFR Estimate If Black Latest Ref Range: >60 mL/min/1.7m2 52 (L)   Calcium Latest Ref Range: 8.5 - 10.1 mg/dL 8.6   Anion Gap Latest Ref Range: 3 - 14 mmol/L 9   Albumin Latest Ref Range: 3.4 - 5.0 g/dL 3.2 (L)   Protein Total Latest Ref Range: 6.8 - 8.8 g/dL 7.0   Bilirubin Total Latest Ref Range: 0.2 - 1.3 mg/dL 2.1 (H)   Alkaline Phosphatase Latest Ref Range: 40 - 150 U/L 89   ALT Latest Ref Range: 0 - 50 U/L 21   AST Latest Ref Range: 0 - 45 U/L 15   Glucose Latest Ref Range: 70 - 99 mg/dL 118 (H)   WBC Latest Ref Range: 4.0 - 11.0 10e9/L 3.9 (L)   Hemoglobin Latest Ref Range: 11.7 - 15.7 g/dL 8.0 (L)   Hematocrit Latest Ref Range: 35.0 - 47.0 % 23.2 (L)   Platelet Count Latest Ref Range: 150 - 450 10e9/L 36 (LL)   RBC Count Latest Ref Range: 3.8 - 5.2 10e12/L 2.02 (L)   MCV Latest Ref Range: 78 - 100 fl 115 (H)   MCH Latest Ref Range: 26.5 - 33.0 pg 39.6 (H)   MCHC Latest Ref Range: 31.5 - 36.5 g/dL 34.5   RDW Latest Ref Range: 10.0 - 15.0 % 21.6 (H)   Diff Method Unknown Automated Method   % Neutrophils Latest Units: % 54.5   % Lymphocytes Latest Units: % 36.3   % Monocytes Latest Units: % 8.2   % Eosinophils Latest Units: % 1.0   % Basophils Latest Units: % 0.0   % Immature Granulocytes Latest Units: % 0.0   Nucleated RBCs Latest Ref Range: 0 /100 1 (H)   Absolute Neutrophil Latest Ref Range: 1.6 - 8.3 10e9/L 2.1   Absolute Lymphocytes Latest Ref Range: 0.8 - 5.3 10e9/L 1.4   Absolute Monocytes Latest Ref Range: 0.0 - 1.3 10e9/L 0.3   Absolute Eosinophils Latest Ref Range: 0.0 - 0.7 10e9/L 0.0   Absolute Basophils Latest Ref Range: 0.0 - 0.2 10e9/L 0.0   Abs Immature Granulocytes Latest Ref Range: 0 - 0.4 10e9/L 0.0   Absolute Nucleated RBC Unknown 0.0   Cyclosporine Last Dose Unknown  Not Provided   Cyclosporine Level Latest Ref Range: 50 - 400 ug/L 217           Rafita Rogel MD

## 2017-06-01 NOTE — NURSING NOTE
"Oncology Rooming Note    June 1, 2017 4:16 PM   Bonny Raymundo is a 71 year old female who presents for:    Chief Complaint   Patient presents with     Oncology Clinic Visit     Patient with Aplastic anemia here for provider visit      Initial Vitals: /89 (BP Location: Right arm, Patient Position: Chair, Cuff Size: Adult Regular)  Pulse 105  Temp 98.1  F (36.7  C) (Oral)  Resp 18  Wt 55.6 kg (122 lb 8 oz)  SpO2 96%  BMI 21.71 kg/m2 Estimated body mass index is 21.71 kg/(m^2) as calculated from the following:    Height as of 5/31/17: 1.6 m (5' 2.99\").    Weight as of this encounter: 55.6 kg (122 lb 8 oz). Body surface area is 1.57 meters squared.  No Pain (1) Comment: back pain    No LMP recorded. Patient has had a hysterectomy.  Allergies reviewed: Yes  Medications reviewed: Yes    Medications: Medication refills not needed today.  Pharmacy name entered into [facility]:    [facility] [place], [place] - [address], [address]  [facility] [place], [place] - [address]  [facility] [place], [place] - [address] [place] [address]. [address]  WRITTEN PRESCRIPTION REQUESTED    Clinical concerns:     7 minutes for nursing intake (face to face time)     Yaritza Giang CMA              "

## 2017-06-14 ENCOUNTER — INFUSION THERAPY VISIT (OUTPATIENT)
Dept: INFUSION THERAPY | Facility: CLINIC | Age: 72
End: 2017-06-14
Attending: PHYSICIAN ASSISTANT
Payer: COMMERCIAL

## 2017-06-14 ENCOUNTER — HOSPITAL ENCOUNTER (OUTPATIENT)
Facility: CLINIC | Age: 72
Setting detail: SPECIMEN
Discharge: HOME | End: 2017-06-14
Admitting: PHYSICIAN ASSISTANT
Payer: COMMERCIAL

## 2017-06-14 VITALS
DIASTOLIC BLOOD PRESSURE: 86 MMHG | TEMPERATURE: 97.7 F | HEART RATE: 93 BPM | SYSTOLIC BLOOD PRESSURE: 126 MMHG | RESPIRATION RATE: 18 BRPM

## 2017-06-14 DIAGNOSIS — D61.3 IDIOPATHIC APLASTIC ANEMIA (H): Primary | ICD-10-CM

## 2017-06-14 DIAGNOSIS — D61.818 PANCYTOPENIA (H): ICD-10-CM

## 2017-06-14 DIAGNOSIS — E87.5 HYPERKALEMIA: ICD-10-CM

## 2017-06-14 DIAGNOSIS — J30.1 SEASONAL ALLERGIC RHINITIS DUE TO POLLEN: ICD-10-CM

## 2017-06-14 LAB
ALBUMIN SERPL-MCNC: 3.1 G/DL (ref 3.4–5)
ALP SERPL-CCNC: 87 U/L (ref 40–150)
ALT SERPL W P-5'-P-CCNC: 24 U/L (ref 0–50)
ANION GAP SERPL CALCULATED.3IONS-SCNC: 5 MMOL/L (ref 3–14)
AST SERPL W P-5'-P-CCNC: 32 U/L (ref 0–45)
BASOPHILS # BLD AUTO: 0 10E9/L (ref 0–0.2)
BASOPHILS NFR BLD AUTO: 0.3 %
BILIRUB SERPL-MCNC: 2.1 MG/DL (ref 0.2–1.3)
BUN SERPL-MCNC: 37 MG/DL (ref 7–30)
CALCIUM SERPL-MCNC: 8.1 MG/DL (ref 8.5–10.1)
CHLORIDE SERPL-SCNC: 109 MMOL/L (ref 94–109)
CO2 SERPL-SCNC: 23 MMOL/L (ref 20–32)
CREAT SERPL-MCNC: 1.18 MG/DL (ref 0.52–1.04)
CYCLOSPORINE BLD LC/MS/MS-MCNC: 213 UG/L (ref 50–400)
DIFFERENTIAL METHOD BLD: ABNORMAL
EOSINOPHIL # BLD AUTO: 0.1 10E9/L (ref 0–0.7)
EOSINOPHIL NFR BLD AUTO: 1.7 %
ERYTHROCYTE [DISTWIDTH] IN BLOOD BY AUTOMATED COUNT: 19.2 % (ref 10–15)
GFR SERPL CREATININE-BSD FRML MDRD: 45 ML/MIN/1.7M2
GLUCOSE SERPL-MCNC: 105 MG/DL (ref 70–99)
HCT VFR BLD AUTO: 23.2 % (ref 35–47)
HGB BLD-MCNC: 7.9 G/DL (ref 11.7–15.7)
IMM GRANULOCYTES # BLD: 0 10E9/L (ref 0–0.4)
IMM GRANULOCYTES NFR BLD: 0 %
LYMPHOCYTES # BLD AUTO: 1.7 10E9/L (ref 0.8–5.3)
LYMPHOCYTES NFR BLD AUTO: 48 %
MCH RBC QN AUTO: 40.1 PG (ref 26.5–33)
MCHC RBC AUTO-ENTMCNC: 34.1 G/DL (ref 31.5–36.5)
MCV RBC AUTO: 118 FL (ref 78–100)
MONOCYTES # BLD AUTO: 0.3 10E9/L (ref 0–1.3)
MONOCYTES NFR BLD AUTO: 7.1 %
NEUTROPHILS # BLD AUTO: 1.5 10E9/L (ref 1.6–8.3)
NEUTROPHILS NFR BLD AUTO: 42.9 %
NRBC # BLD AUTO: 0 10*3/UL
NRBC BLD AUTO-RTO: 1 /100
PLATELET # BLD AUTO: 38 10E9/L (ref 150–450)
POTASSIUM SERPL-SCNC: 4.1 MMOL/L (ref 3.4–5.3)
POTASSIUM SERPL-SCNC: 6.5 MMOL/L (ref 3.4–5.3)
PROT SERPL-MCNC: 7 G/DL (ref 6.8–8.8)
RBC # BLD AUTO: 1.97 10E12/L (ref 3.8–5.2)
RETICS # AUTO: 64.9 10E9/L (ref 25–95)
RETICS/RBC NFR AUTO: 3.3 % (ref 0.5–2)
SODIUM SERPL-SCNC: 137 MMOL/L (ref 133–144)
TME LAST DOSE: NORMAL H
TSH SERPL DL<=0.005 MIU/L-ACNC: 1.16 MU/L (ref 0.4–4)
WBC # BLD AUTO: 3.5 10E9/L (ref 4–11)

## 2017-06-14 PROCEDURE — 84443 ASSAY THYROID STIM HORMONE: CPT | Performed by: PHYSICIAN ASSISTANT

## 2017-06-14 PROCEDURE — 85045 AUTOMATED RETICULOCYTE COUNT: CPT | Performed by: PHYSICIAN ASSISTANT

## 2017-06-14 PROCEDURE — 85025 COMPLETE CBC W/AUTO DIFF WBC: CPT | Performed by: PHYSICIAN ASSISTANT

## 2017-06-14 PROCEDURE — 80053 COMPREHEN METABOLIC PANEL: CPT | Performed by: PHYSICIAN ASSISTANT

## 2017-06-14 PROCEDURE — 36592 COLLECT BLOOD FROM PICC: CPT

## 2017-06-14 PROCEDURE — 80158 DRUG ASSAY CYCLOSPORINE: CPT | Performed by: PHYSICIAN ASSISTANT

## 2017-06-14 PROCEDURE — 84132 ASSAY OF SERUM POTASSIUM: CPT | Mod: 91 | Performed by: INTERNAL MEDICINE

## 2017-06-14 ASSESSMENT — PAIN SCALES - GENERAL: PAINLEVEL: MILD PAIN (2)

## 2017-06-14 NOTE — MR AVS SNAPSHOT
After Visit Summary   6/14/2017    Bonny Raymundo    MRN: [id number]           Patient Information     Date Of Birth          1945        Visit Information        Provider Department      6/14/2017 9:00 AM SH INFUSION CHAIR 14 [facility]        Today's Diagnoses     Idiopathic aplastic anemia (H)        Pancytopenia (H)        Seasonal allergic rhinitis due to pollen           Follow-ups after your visit        Your next 10 appointments already scheduled     Jun 21, 2017  1:30 PM CDT   Level 1 with SH INFUSION CHAIR 2   [facility] ([facility])    [facility]  [address]  [place] [address]   [phone]            Jun 28, 2017  9:00 AM CDT   Level 5 with SH INFUSION CHAIR 12   [facility] ([facility])    [facility]  [address]  [place] [address]   [phone]            Jul 05, 2017  1:30 PM CDT   Level 1 with SH INFUSION CHAIR 2   [facility] ([facility])    [facility] [place]  [address]  [place] [address]   [phone]            Jul 12, 2017  9:00 AM CDT   Level 5 with SH INFUSION CHAIR 2   [facility] ([facility])    [facility]  [address]  [place] [address]   [phone]            Jul 19, 2017  1:00 PM CDT   Level 1 with SH INFUSION CHAIR 15   [facility] ([facility])    [facility] [place]  [address]  [place] [address]   [phone]            Jul 26, 2017  9:00 AM CDT   Level 5 with SH INFUSION CHAIR 10   [facility] ([facility])    [facility]  [address]  [place]  "[place] [address] [phone]              Who to contact     If you have questions or need follow up information about today's clinic visit or your schedule please contact [facility] directly at [phone].  Normal or non-critical lab and imaging results will be communicated to you by MyChart, letter or phone within 4 business days after the clinic has received the results. If you do not hear from us within 7 days, please contact the clinic through MyChart or phone. If you have a critical or abnormal lab result, we will notify you by phone as soon as possible.  Submit refill requests through [facility] or call your pharmacy and they will forward the refill request to us. Please allow 3 business days for your refill to be completed.          Additional Information About Your Visit        [employer] Information     [employer] lets you send messages to your doctor, view your test results, renew your prescriptions, schedule appointments and more. To sign up, go to [email or web address] . Click on \"Log in\" on the left side of the screen, which will take you to the Welcome page. Then click on \"Sign up Now\" on the right side of the page.     You will be asked to enter the access code listed below, as well as some personal information. Please follow the directions to create your username and password.     Your access code is: [email or web address]  Expires: 2017  9:26 AM     Your access code will  in 90 days. If you need help or a new code, please call your [place] clinic or [phone].        Care EveryWhere ID     This is your Care EveryWhere ID. This could be used by other organizations to access your [place] medical records  [id number]         Blood Pressure from Last 3 Encounters:   17 131/89   17 115/73   17 122/78    Weight from Last 3 Encounters:   17 55.6 kg (122 lb 8 oz)   17 55.3 kg (121 lb 14.4 oz)   17 58.3 kg (128 lb 9.6 oz)            "   We Performed the Following     Comprehensive metabolic panel     Cyclosporine     Reticulocyte count     TSH with free T4 reflex        Primary Care Provider Office Phone # Fax #    Shivani Phelps PA-C [phone]       [facility] [address] [facility] [address]  [facility] [address]        Thank you!     Thank you for choosing [facility]  for your care. Our goal is always to provide you with excellent care. Hearing back from our patients is one way we can continue to improve our services. Please take a few minutes to complete the written survey that you may receive in the mail after your visit with us. Thank you!             Your Updated Medication List - Protect others around you: Learn how to safely use, store and throw away your medicines at www.disposemymeds.org.          This list is accurate as of: 6/14/17  9:26 AM.  Always use your most recent med list.                   Brand Name Dispense Instructions for use    BENADRYL ALLERGY 25 MG tablet   Generic drug:  diphenhydrAMINE     56 tablet    Take 1 tablet (25 mg) by mouth nightly as needed       carbamide peroxide 6.5 % otic solution    DEBROX    30 mL    Place 5-10 drops into the right ear 2 times daily       chlorpheniramine 4 MG tablet    CHLOR-TRIMETON     Take 4 mg by mouth every 6 hours as needed. For head cold       COMPRESSION STOCKINGS     2 each    Wear compression stockings at 20-30 mmHg rating most time during the day to the affected leg (left leg) or both legs. Take them off at night.       * cycloSPORINE modified 25 MG capsule    GENERIC EQUIVALENT    300 capsule    Take 75 mg by mouth 2 times daily       * cycloSPORINE modified 100 MG capsule    GENERIC EQUIVALENT    120 capsule    Take 1 capsule (100 mg) by mouth 2 times daily Reported on 5/4/2017       diclofenac 1 % Gel topical gel    VOLTAREN    100 g    Apply 2 g topically 2 times daily       eltrombopag 50 MG tablet    PROMACTA    90  tablet    Take 3 tablets (150 mg) by mouth daily Administer on an empty stomach, 1 hour before or 2 hours after a meal. Or as directed       * fluticasone 50 MCG/ACT spray    FLONASE    1 Bottle    Spray 1-2 sprays into both nostrils daily       * fluticasone 50 MCG/ACT spray    FLONASE    1 Bottle    Spray 1-2 sprays into both nostrils daily       loratadine 10 MG tablet    CLARITIN     Take 10 mg by mouth daily as needed Reported on 4/26/2017       ondansetron 4 MG ODT tab    ZOFRAN-ODT    20 tablet    Take 1 tablet (4 mg) by mouth every 6 hours as needed for nausea       order for DME     1 Box    Equipment being ordered: knee high compression stockings- 18-20 mm       oxyCODONE 5 MG IR tablet    ROXICODONE    50 tablet    Take 1 tablet (5 mg) by mouth every 6 hours as needed for moderate to severe pain       pantoprazole 40 MG EC tablet    PROTONIX    60 tablet    Take 1 tablet (40 mg) by mouth 2 times daily       polyethylene glycol Packet    MIRALAX/GLYCOLAX    14 packet    Take 17 g by mouth daily as needed for constipation . If you start to have loose stools/diarrhea or multiple bowel movements, ok to hold this medication. Then resume if no bowel movement after 24-48hours.       pseudoePHEDrine 120 MG 12 hr tablet    SUDAFED     Take 120 mg by mouth every 12 hours       SENNA S 8.6-50 MG per tablet   Generic drug:  senna-docusate     100 tablet    Can take 1-2 tablets every other day       sucralfate 1 GM/10ML suspension    CARAFATE    1200 mL    Take 10 mLs (1 g) by mouth 2 times daily as needed       TYLENOL PO      Take 325 mg by mouth every 6 hours as needed for mild pain or fever       * Notice:  This list has 4 medication(s) that are the same as other medications prescribed for you. Read the directions carefully, and ask your doctor or other care provider to review them with you.

## 2017-06-14 NOTE — PROGRESS NOTES
"Infusion Nursing Note:  Bonny Raymundo presents today for labs, possible transfusion, PICC dressing change.    Patient seen by provider today: No   present during visit today: Not Applicable.    Note: Patient feeling well overall, refer to doc flowsheet for assessment. No new concerns today. Patient did not meet parameters for either PRBCs or platelets so patient was discharged. CMP results called to writer from lab after patient had been discharged. Per lab, patient's plasma is a \"coffee color\" and the potassium level is likely not accurate. Writer paged Dr. Rogel with this info, orders as follows:    Recheck potassium via peripheral lab draw    Writer called and spoke with patient, she is agreeable to come back in today to have it re-drawn.    1242: Patient returned for recheck of potassium. Lab drawn from right AC with 23g butterfly. Patient will await results.   Potassium recheck results: 4.1    Intravenous Access:  Labs drawn without difficulty.  PICC. Caps changed and dressing changed per sterile protocol.     Treatment Conditions:  Lab Results   Component Value Date    HGB 7.9 06/14/2017     Lab Results   Component Value Date    WBC 3.5 06/14/2017      Lab Results   Component Value Date    ANEU 1.5 06/14/2017     Lab Results   Component Value Date    PLT 38 06/14/2017      Lab Results   Component Value Date     06/14/2017                   Lab Results   Component Value Date    POTASSIUM 6.5 06/14/2017           Lab Results   Component Value Date    MAG 1.7 05/17/2017            Lab Results   Component Value Date    CR 1.18 06/14/2017                   Lab Results   Component Value Date    LEO 8.1 06/14/2017                Lab Results   Component Value Date    BILITOTAL 2.1 06/14/2017           Lab Results   Component Value Date    ALBUMIN 3.1 06/14/2017                    Lab Results   Component Value Date    ALT 24 06/14/2017           Lab Results   Component Value Date    AST 32 " 06/14/2017     Results reviewed, labs did NOT meet treatment parameters for PRBCs or platelets. Refer to above note re: CMP.      Post Infusion Assessment:  Site patent and intact, free from redness, edema or discomfort.  No evidence of extravasations.    Discharge Plan:   Discharge instructions reviewed with: Patient.  Patient and/or family verbalized understanding of discharge instructions and all questions answered.  Copy of AVS reviewed with patient and/or family.  Patient will return 6/21 for next appointment.  Patient discharged in stable condition accompanied by: self.  Departure Mode: Ambulatory.      Demetrice Connolly RN

## 2017-06-21 ENCOUNTER — INFUSION THERAPY VISIT (OUTPATIENT)
Dept: INFUSION THERAPY | Facility: CLINIC | Age: 72
End: 2017-06-21
Attending: INTERNAL MEDICINE
Payer: COMMERCIAL

## 2017-06-21 DIAGNOSIS — D61.3 IDIOPATHIC APLASTIC ANEMIA (H): Primary | ICD-10-CM

## 2017-06-21 PROCEDURE — 40000269 ZZH STATISTIC NO CHARGE FACILITY FEE

## 2017-06-21 RX ORDER — ALBUTEROL SULFATE 0.83 MG/ML
2.5 SOLUTION RESPIRATORY (INHALATION) EVERY 6 HOURS PRN
Status: DISCONTINUED | OUTPATIENT
Start: 2017-06-21 | End: 2017-06-21 | Stop reason: HOSPADM

## 2017-06-21 RX ORDER — PENTAMIDINE ISETHIONATE 300 MG/300MG
300 INHALANT RESPIRATORY (INHALATION) ONCE
Status: CANCELLED
Start: 2017-07-19 | End: 2017-07-19

## 2017-06-21 RX ORDER — ALBUTEROL SULFATE 5 MG/ML
2.5 SOLUTION RESPIRATORY (INHALATION) EVERY 6 HOURS PRN
Status: CANCELLED
Start: 2017-07-01

## 2017-06-21 RX ORDER — ALBUTEROL SULFATE 5 MG/ML
2.5 SOLUTION RESPIRATORY (INHALATION) ONCE
Status: CANCELLED
Start: 2017-07-19 | End: 2017-07-19

## 2017-06-21 RX ORDER — PENTAMIDINE ISETHIONATE 300 MG/300MG
300 INHALANT RESPIRATORY (INHALATION) ONCE
Status: CANCELLED
Start: 2017-07-01 | End: 2017-07-01

## 2017-06-21 RX ORDER — PENTAMIDINE ISETHIONATE 300 MG/300MG
300 INHALANT RESPIRATORY (INHALATION) ONCE
Status: DISCONTINUED | OUTPATIENT
Start: 2017-06-21 | End: 2017-06-21 | Stop reason: HOSPADM

## 2017-06-21 NOTE — PROGRESS NOTES
Infusion Nursing Note:  Bonny Raymundo presents today for Picc Dressing change.    Patient seen by provider today: No   present during visit today: Not Applicable.    Note: Patient refused all labs today. She stated she took her Cyclosporine this morning and was not due for CBC/possible transfusion because that is every other week. Nurse explained according to open orders it looks as if she is need of both. Patient stated she would get these labs drawn next week. She was admit she did not want Pentamidine today and would schedule it for next week.     Intravenous Access:  PICC; dressing and caps changed    Treatment Conditions:  Not Applicable.      Post Infusion Assessment:  Site patent and intact, free from redness, edema or discomfort.  No evidence of extravasations.    Discharge Plan:   Discharge instructions reviewed with: Patient.  Patient and/or family verbalized understanding of discharge instructions and all questions answered.  Copy of AVS reviewed with patient and/or family.  Patient will return 6/28/2017 for next appointment.  Patient discharged in stable condition accompanied by: self.  Departure Mode: Ambulatory.    Lady Lovett RN

## 2017-06-21 NOTE — PROGRESS NOTES
"Infusion Nursing Note:  Bonny Raymundo presents today for ressing change  Patient seen by provider today: {YES (EXPLAIN)/NO:812303}   present during visit today: {UMHLANGUAGE:406717}    Note: {Not Applicable or free text:974965:s:\"N/A\"}.    Intravenous Access:  {UMHIVACCESS:144908}    Treatment Conditions:  {UMHTXCONDITIONS:752364}      Post Infusion Assessment:  {UMHPOSTINFUSION:208969}    Discharge Plan:   {UMHDISCHARGE:255895}    Lady Lovett RN    Pt took cyclosporine                         "

## 2017-06-21 NOTE — MR AVS SNAPSHOT
After Visit Summary   6/21/2017    Bonny Raymundo    MRN: [id number]           Patient Information     Date Of Birth          1945        Visit Information        Provider Department      6/21/2017 1:30 PM SH INFUSION CHAIR 2 [facility]        Today's Diagnoses     Idiopathic aplastic anemia (H)    -  1       Follow-ups after your visit        Your next 10 appointments already scheduled     Jun 22, 2017  4:30 PM CDT   Office Visit with Shivani Phelps PA-C   [facility]    [address] [facility] [address]   [phone]           Bring a current list of meds and any records pertaining to this visit.  For Physicals, please bring immunization records and any forms needing to be filled out.  Please arrive 10 minutes early to complete paperwork.            Jun 28, 2017  9:00 AM CDT   Level 5 with  INFUSION CHAIR 12   [facility] ([facility])    [facility]  [address]  [facility] [address]   [phone]            Jul 05, 2017  1:30 PM CDT   Level 1 with SH INFUSION CHAIR 2   [facility] ([facility])    [facility]  [address]  [facility] [address]   [phone]            Jul 12, 2017  9:00 AM CDT   Level 5 with SH INFUSION CHAIR 2   [facility] ([facility])    [facility]  [address]  [facility] [address]   [phone]            Jul 13, 2017  4:00 PM CDT   RETURN ONC with Rafita Rogel MD   [facility] ([facility] [place])    [address] [facility] [address]  [address] [facility] [address] [phone]            Jul 19, 2017  1:00 PM CDT   Level 1 with  INFUSION CHAIR 15   [facility] [place]  "([facility])    [facility] [place]  [address]  [place] [address] [phone]            2017  9:00 AM CDT   Level 5 with  INFUSION CHAIR 10   [facility] ([facility])    [facility] [place]  [address]  [place] [id number] [phone]              Who to contact     If you have questions or need follow up information about today's clinic visit or your schedule please contact [facility] directly at [phone].  Normal or non-critical lab and imaging results will be communicated to you by [employer], letter or phone within 4 business days after the clinic has received the results. If you do not hear from us within 7 days, please contact the clinic through [employer] or phone. If you have a critical or abnormal lab result, we will notify you by phone as soon as possible.  Submit refill requests through [employer] or call your pharmacy and they will forward the refill request to us. Please allow 3 business days for your refill to be completed.          Additional Information About Your Visit        MyChart Information     [employer] lets you send messages to your doctor, view your test results, renew your prescriptions, schedule appointments and more. To sign up, go to [email or web address] . Click on \"Log in\" on the left side of the screen, which will take you to the Welcome page. Then click on \"Sign up Now\" on the right side of the page.     You will be asked to enter the access code listed below, as well as some personal information. Please follow the directions to create your username and password.     Your access code is: [email or web address]  Expires: 2017  9:26 AM     Your access code will  in 90 days. If you need help or a new code, please call your [place] clinic or [phone].        Care EveryWhere ID     This is your Care EveryWhere ID. This " could be used by other organizations to access your [facility] medical records  [id number]         Blood Pressure from Last 3 Encounters:   06/14/17 126/86   06/01/17 131/89   05/31/17 115/73    Weight from Last 3 Encounters:   06/01/17 55.6 kg (122 lb 8 oz)   05/31/17 55.3 kg (121 lb 14.4 oz)   05/08/17 58.3 kg (128 lb 9.6 oz)              Today, you had the following     No orders found for display       Primary Care Provider Office Phone # Fax #    Shivani Phelps PA-C [phone]       [facility] [place] [address] [facility] [address]  [place] [address]        Equal Access to Services     CHARLES NOLASCO : Hadii erica ku hadasho Soomaali, waaxda luqadaha, qaybta kaalmada adeegyada, ambika strong . So [facility] [phone].    ATENCIÓN: Si habla español, tiene a robertson disposición servicios gratuitos de asistencia lingüística. [facility] [phone].    We comply with applicable federal civil rights laws and [place] laws. We do not discriminate on the basis of race, color, national origin, age, disability sex, sexual orientation or gender identity.            Thank you!     Thank you for choosing [facility]  for your care. Our goal is always to provide you with excellent care. Hearing back from our patients is one way we can continue to improve our services. Please take a few minutes to complete the written survey that you may receive in the mail after your visit with us. Thank you!             Your Updated Medication List - Protect others around you: Learn how to safely use, store and throw away your medicines at www.disposemymeds.org.          This list is accurate as of: 6/21/17  2:10 PM.  Always use your most recent med list.                   Brand Name Dispense Instructions for use Diagnosis    BENADRYL ALLERGY 25 MG tablet   Generic drug:  diphenhydrAMINE     56 tablet    Take 1 tablet (25 mg) by mouth nightly as needed        carbamide peroxide  6.5 % otic solution    DEBROX    30 mL    Place 5-10 drops into the right ear 2 times daily    Idiopathic aplastic anemia (H), Seasonal allergic rhinitis due to pollen       chlorpheniramine 4 MG tablet    CHLOR-TRIMETON     Take 4 mg by mouth every 6 hours as needed. For head cold        COMPRESSION STOCKINGS     2 each    Wear compression stockings at 20-30 mmHg rating most time during the day to the affected leg (left leg) or both legs. Take them off at night.    DVT (deep venous thrombosis), left, Postphlebitic syndrome       cycloSPORINE modified 100 MG capsule    GENERIC EQUIVALENT    120 capsule    Take 1 capsule (100 mg) by mouth 2 times daily Reported on 5/4/2017    Gastroesophageal reflux disease without esophagitis, Left-sided low back pain without sciatica, unspecified chronicity, Constipation, unspecified constipation type, Hemorrhoids, unspecified hemorrhoid type       diclofenac 1 % Gel topical gel    VOLTAREN    100 g    Apply 2 g topically 2 times daily    Myofascial pain       eltrombopag 50 MG tablet    PROMACTA    90 tablet    Take 3 tablets (150 mg) by mouth daily Administer on an empty stomach, 1 hour before or 2 hours after a meal. Or as directed    Idiopathic aplastic anemia (H), Pancytopenia (H)       * fluticasone 50 MCG/ACT spray    FLONASE    1 Bottle    Spray 1-2 sprays into both nostrils daily    Rhinitis medicamentosa, Bilateral impacted cerumen, Nasal congestion       * fluticasone 50 MCG/ACT spray    FLONASE    1 Bottle    Spray 1-2 sprays into both nostrils daily    Idiopathic aplastic anemia (H), Seasonal allergic rhinitis due to pollen       loratadine 10 MG tablet    CLARITIN     Take 10 mg by mouth daily as needed Reported on 4/26/2017        ondansetron 4 MG ODT tab    ZOFRAN-ODT    20 tablet    Take 1 tablet (4 mg) by mouth every 6 hours as needed for nausea    Nausea and vomiting, intractability of vomiting not specified, unspecified vomiting type       order for DME     1  Box    Equipment being ordered: knee high compression stockings- 18-20 mm    DVT prophylaxis       oxyCODONE 5 MG IR tablet    ROXICODONE    50 tablet    Take 1 tablet (5 mg) by mouth every 6 hours as needed for moderate to severe pain    Left-sided low back pain without sciatica, unspecified chronicity       pantoprazole 40 MG EC tablet    PROTONIX    60 tablet    Take 1 tablet (40 mg) by mouth 2 times daily    Gastroesophageal reflux disease without esophagitis, Left-sided low back pain without sciatica, unspecified chronicity, Constipation, unspecified constipation type, Hemorrhoids, unspecified hemorrhoid type       polyethylene glycol Packet    MIRALAX/GLYCOLAX    14 packet    Take 17 g by mouth daily as needed for constipation . If you start to have loose stools/diarrhea or multiple bowel movements, ok to hold this medication. Then resume if no bowel movement after 24-48hours.    Hemorrhoids, unspecified hemorrhoid type, Gastroesophageal reflux disease without esophagitis, Left-sided low back pain without sciatica, unspecified chronicity, Constipation, unspecified constipation type       pseudoePHEDrine 120 MG 12 hr tablet    SUDAFED     Take 120 mg by mouth every 12 hours        SENNA S 8.6-50 MG per tablet   Generic drug:  senna-docusate     100 tablet    Can take 1-2 tablets every other day    Left-sided low back pain without sciatica, unspecified chronicity, Constipation, unspecified constipation type, Gastroesophageal reflux disease without esophagitis, Hemorrhoids, unspecified hemorrhoid type       sucralfate 1 GM/10ML suspension    CARAFATE    1200 mL    Take 10 mLs (1 g) by mouth 2 times daily as needed    Gastroesophageal reflux disease without esophagitis, Left-sided low back pain without sciatica, unspecified chronicity, Constipation, unspecified constipation type, Hemorrhoids, unspecified hemorrhoid type       TYLENOL PO      Take 325 mg by mouth every 6 hours as needed for mild pain or fever         * Notice:  This list has 2 medication(s) that are the same as other medications prescribed for you. Read the directions carefully, and ask your doctor or other care provider to review them with you.

## 2017-06-22 ENCOUNTER — OFFICE VISIT (OUTPATIENT)
Dept: FAMILY MEDICINE | Facility: CLINIC | Age: 72
End: 2017-06-22
Payer: COMMERCIAL

## 2017-06-22 VITALS
DIASTOLIC BLOOD PRESSURE: 91 MMHG | OXYGEN SATURATION: 95 % | HEART RATE: 88 BPM | WEIGHT: 119.7 LBS | HEIGHT: 62 IN | SYSTOLIC BLOOD PRESSURE: 138 MMHG | TEMPERATURE: 98.4 F | BODY MASS INDEX: 22.03 KG/M2

## 2017-06-22 DIAGNOSIS — H74.8X1 HEMATOTYMPANUM OF RIGHT EAR: Primary | ICD-10-CM

## 2017-06-22 DIAGNOSIS — D61.3 IDIOPATHIC APLASTIC ANEMIA (H): ICD-10-CM

## 2017-06-22 PROCEDURE — 99213 OFFICE O/P EST LOW 20 MIN: CPT | Performed by: PHYSICIAN ASSISTANT

## 2017-06-22 NOTE — PROGRESS NOTES
HPI: This is a 72 yo female with  here with complaint of R ear plugging that feels like cotton x one month  Using ear wax drops without relief.  Denies any cold sxs  Denies ear pain, tinnitus, ear drainage, HA or dizziness.  Was seen on 17 and had some wax removed from the R ear with cerumen spoon without complications.    Past Medical History:   Diagnosis Date     Allergic rhinitis due to other allergen      Aplastic anemia (H) 2017     Closed anterior dislocation of humerus      DVT of lower extremity (deep venous thrombosis) (H) 10-    Left after prolonged sitting-plane     DVT, recurrent, lower extremity, acute (H)      Headache(784.0)      Osteoporosis, unspecified      Pulmonary emboli (H) 10-     Sensorineural hearing loss, unspecified      Sprain of ankle, unspecified site     L     Past Surgical History:   Procedure Laterality Date     ARTHRODESIS FOOT  11    Hallux valgus R-1st MP joint     BLEPHAROPLASTY BILATERAL  ,      BONE MARROW BIOPSY, BONE SPECIMEN, NEEDLE/TROCAR N/A 2016    Procedure: BIOPSY BONE MARROW;  Surgeon: Mu Morgan MD;  Location: [facility]     BONE MARROW BIOPSY, BONE SPECIMEN, NEEDLE/TROCAR N/A 2016    Procedure: BIOPSY BONE MARROW;  Surgeon: Mu Morgan MD;  Location: [facility] DEXA INTERPRETATION, AXIAL  03     C NONSPECIFIC PROCEDURE           C NONSPECIFIC PROCEDURE      hysterectomy/BSO     C NONSPECIFIC PROCEDURE      myomectomy (fibroids)     CATARACT IOL, RT/LT Right      CATARACT IOL, RT/LT Left      EXCHANGE INTRAOCULAR LENS IMPLANT Right 2015    Procedure: EXCHANGE INTRAOCULAR LENS IMPLANT;  Surgeon: Garrett Dawson MD;  Location:  [facility]     HC COLONOSCOPY THRU STOMA, DIAGNOSTIC      normal- minimal diverticulosis     PICC INSERTION Left 2017    5fr DL BioFlo PICC, 42cm (2cm external) in the L basilic vein w/ tip in the  SVC RA junction.     VITRECTOMY PARSPLANA WITH 23  GAUGE SYSTEM Right 2/2/2015    Procedure: VITRECTOMY PARSPLANA WITH 23 GAUGE SYSTEM;  Surgeon: Racheal Loyd MD;  Location: [facility]     Social History   Substance Use Topics     Smoking status: Former Smoker     Quit date: 5/14/1973     Smokeless tobacco: Never Used     Alcohol use No      Comment: occasionally     Current Outpatient Prescriptions   Medication Sig Dispense Refill     carbamide peroxide (DEBROX) 6.5 % otic solution Place 5-10 drops into the right ear 2 times daily 30 mL 0     fluticasone (FLONASE) 50 MCG/ACT spray Spray 1-2 sprays into both nostrils daily 1 Bottle 3     fluticasone (FLONASE) 50 MCG/ACT spray Spray 1-2 sprays into both nostrils daily 1 Bottle 11     pantoprazole (PROTONIX) 40 MG EC tablet Take 1 tablet (40 mg) by mouth 2 times daily 60 tablet 3     senna-docusate (SENNA S) 8.6-50 MG per tablet Can take 1-2 tablets every other day 100 tablet 1     cycloSPORINE modified (GENERIC EQUIVALENT) 100 MG capsule Take 1 capsule (100 mg) by mouth 2 times daily Reported on 5/4/2017 120 capsule      polyethylene glycol (MIRALAX/GLYCOLAX) Packet Take 17 g by mouth daily as needed for constipation . If you start to have loose stools/diarrhea or multiple bowel movements, ok to hold this medication. Then resume if no bowel movement after 24-48hours. 14 packet 0     sucralfate (CARAFATE) 1 GM/10ML suspension Take 10 mLs (1 g) by mouth 2 times daily as needed 1200 mL 3     pseudoePHEDrine (SUDAFED) 120 MG 12 hr tablet Take 120 mg by mouth every 12 hours       ondansetron (ZOFRAN-ODT) 4 MG ODT tab Take 1 tablet (4 mg) by mouth every 6 hours as needed for nausea 20 tablet 1     oxyCODONE (ROXICODONE) 5 MG IR tablet Take 1 tablet (5 mg) by mouth every 6 hours as needed for moderate to severe pain 50 tablet 0     eltrombopag (PROMACTA) 50 MG tablet Take 3 tablets (150 mg) by mouth daily Administer on an empty stomach, 1 hour before or 2 hours after a meal. Or as directed 90 tablet 6     diclofenac  "(VOLTAREN) 1 % GEL topical gel Apply 2 g topically 2 times daily 100 g 1     diphenhydrAMINE (BENADRYL ALLERGY) 25 MG tablet Take 1 tablet (25 mg) by mouth nightly as needed 56 tablet      COMPRESSION STOCKINGS Wear compression stockings at 20-30 mmHg rating most time during the day to the affected leg (left leg) or both legs. Take them off at night. 2 each 2     Acetaminophen (TYLENOL PO) Take 325 mg by mouth every 6 hours as needed for mild pain or fever       ORDER FOR DME Equipment being ordered: knee high compression stockings- 18-20 mm 1 Box 1     loratadine (CLARITIN) 10 MG tablet Take 10 mg by mouth daily as needed Reported on 4/26/2017       chlorpheniramine (CHLOR-TRIMETON) 4 MG tablet Take 4 mg by mouth every 6 hours as needed. For head cold        Allergies   Allergen Reactions     Cats      Dogs      No Known Drug Allergies      Seasonal Allergies        PHYSICAL EXAM:    BP (!) 138/91 (BP Location: Left arm, Patient Position: Chair, Cuff Size: Adult Regular)  Pulse 88  Temp 98.4  F (36.9  C) (Tympanic)  Ht 5' 2\" (1.575 m)  Wt 119 lb 11.2 oz (54.3 kg)  SpO2 95%  BMI 21.89 kg/m2    Patient appears non toxic  R ear: no cerumen or edema of the canal.  +maroon colored discharged noted behind TM. No erythema of the TM itself.    L Ear: normal    Results for orders placed or performed in visit on 06/14/17   Cyclosporine   Result Value Ref Range    Cyclosporine Last Dose Rh Unknown     Cyclosporine Level 213 50 - 400 ug/L   Comprehensive metabolic panel   Result Value Ref Range    Sodium 137 133 - 144 mmol/L    Potassium 6.5 (HH) 3.4 - 5.3 mmol/L    Chloride 109 94 - 109 mmol/L    Carbon Dioxide 23 20 - 32 mmol/L    Anion Gap 5 3 - 14 mmol/L    Glucose 105 (H) 70 - 99 mg/dL    Urea Nitrogen 37 (H) 7 - 30 mg/dL    Creatinine 1.18 (H) 0.52 - 1.04 mg/dL    GFR Estimate 45 (L) >60 mL/min/1.7m2    GFR Estimate If Black 54 (L) >60 mL/min/1.7m2    Calcium 8.1 (L) 8.5 - 10.1 mg/dL    Bilirubin Total 2.1 (H) 0.2 " - 1.3 mg/dL    Albumin 3.1 (L) 3.4 - 5.0 g/dL    Protein Total 7.0 6.8 - 8.8 g/dL    Alkaline Phosphatase 87 40 - 150 U/L    ALT 24 0 - 50 U/L    AST 32 0 - 45 U/L   Reticulocyte count   Result Value Ref Range    % Retic 3.3 (H) 0.5 - 2.0 %    Absolute Retic 64.9 25 - 95 10e9/L   TSH with free T4 reflex   Result Value Ref Range    TSH 1.16 0.40 - 4.00 mU/L   CBC with platelets differential   Result Value Ref Range    WBC 3.5 (L) 4.0 - 11.0 10e9/L    RBC Count 1.97 (L) 3.8 - 5.2 10e12/L    Hemoglobin 7.9 (L) 11.7 - 15.7 g/dL    Hematocrit 23.2 (L) 35.0 - 47.0 %     (H) 78 - 100 fl    MCH 40.1 (H) 26.5 - 33.0 pg    MCHC 34.1 31.5 - 36.5 g/dL    RDW 19.2 (H) 10.0 - 15.0 %    Platelet Count 38 (LL) 150 - 450 10e9/L    Diff Method Automated Method     % Neutrophils 42.9 %    % Lymphocytes 48.0 %    % Monocytes 7.1 %    % Eosinophils 1.7 %    % Basophils 0.3 %    % Immature Granulocytes 0.0 %    Nucleated RBCs 1 (H) 0 /100    Absolute Neutrophil 1.5 (L) 1.6 - 8.3 10e9/L    Absolute Lymphocytes 1.7 0.8 - 5.3 10e9/L    Absolute Monocytes 0.3 0.0 - 1.3 10e9/L    Absolute Eosinophils 0.1 0.0 - 0.7 10e9/L    Absolute Basophils 0.0 0.0 - 0.2 10e9/L    Abs Immature Granulocytes 0.0 0 - 0.4 10e9/L    Absolute Nucleated RBC 0.0    Potassium   Result Value Ref Range    Potassium 4.1 3.4 - 5.3 mmol/L       Assessment and Plan:     (H74.8X1) Hematotympanum of right ear  (primary encounter diagnosis)  Comment: discussed case with Dr. Krueger.  She likely bled into that space due to low plts.  He agrees to see pt next week to perform myringotomy if still symptomatic.  Plan: OTOLARYNGOLOGY REFERRAL        Dr. Krueger    (D61.3) Idiopathic aplastic anemia (H)  Comment:   Plan: per hematology      Shivani Phelps PA-C

## 2017-06-22 NOTE — MR AVS SNAPSHOT
After Visit Summary   6/22/2017    Bonny Raymundo    MRN: [id number]           Patient Information     Date Of Birth          1945        Visit Information        Provider Department      6/22/2017 4:30 PM Shivani Phelps PA-C [facility]        Today's Diagnoses     Hematotympanum of right ear    -  1    Idiopathic aplastic anemia (H)           Follow-ups after your visit        Additional Services     OTOLARYNGOLOGY REFERRAL       Your provider has referred you to: [facility]: [facility] [place] [phone]   [email or web address]    Please be aware that coverage of these services is subject to the terms and limitations of your health insurance plan.  Call member services at your health plan with any benefit or coverage questions.      Please bring the following with you to your appointment:    (1) Any X-Rays, CTs or MRIs which have been performed.  Contact the facility where they were done to arrange for  prior to your scheduled appointment.   (2) List of current medications  (3) This referral request   (4) Any documents/labs given to you for this referral                  Your next 10 appointments already scheduled     Jun 28, 2017  9:00 AM CDT   Level 5 with  INFUSION CHAIR 12   [facility] ([facility])    [facility]  [address]  [place] [address]   [phone]            Jul 05, 2017  1:30 PM CDT   Level 1 with  INFUSION CHAIR 2   [facility] ([facility])    [facility]  [address]  [place] [address]   [phone]            Jul 12, 2017  9:00 AM CDT   Level 5 with SH INFUSION CHAIR 2   [facility] ([facility])    [facility]  [address]  [place] [address]   [phone]            Jul  "13, 2017  4:00 PM CDT   RETURN ONC with Rafita Rogel MD   [facility] Trans[facility] ([facility])    [address]  2[address] Floor  [facility] [address] [phone]            Jul 19, 2017  1:00 PM CDT   Level 1 with SH INFUSION CHAIR 15   [facility] ([facility])    [facility]  [address]  [facility] [address] [phone]            Jul 26, 2017  9:00 AM CDT   Level 5 with SH INFUSION CHAIR 10   [facility] ([facility])    [facility]  [address]  [facility] [address] [phone]              Who to contact     If you have questions or need follow up information about today's clinic visit or your schedule please contact [facility] directly at [phone].  Normal or non-critical lab and imaging results will be communicated to you by [employer], letter or phone within 4 business days after the clinic has received the results. If you do not hear from us within 7 days, please contact the clinic through [employer] or phone. If you have a critical or abnormal lab result, we will notify you by phone as soon as possible.  Submit refill requests through [employer] or call your pharmacy and they will forward the refill request to us. Please allow 3 business days for your refill to be completed.          Additional Information About Your Visit        Lumetrics Information     Lumetrics lets you send messages to your doctor, view your test results, renew your prescriptions, schedule appointments and more. To sign up, go to [email or web address] . Click on \"Log in\" on the left side of the screen, which will take you to the Welcome page. Then click on \"Sign up Now\" on the right side of the page.     You will be asked to enter the access code listed below, as well as some personal information. Please " "follow the directions to create your username and password.     Your access code is: [email or web address]  Expires: 2017  9:26 AM     Your access code will  in 90 days. If you need help or a new code, please call your [place] clinic or [phone].        Care EveryWhere ID     This is your Care EveryWhere ID. This could be used by other organizations to access your [place] medical records  [id number]        Your Vitals Were     Pulse Temperature Height Pulse Oximetry BMI (Body Mass Index)       88 98.4  F (36.9  C) (Tympanic) 5' 2\" (1.575 m) 95% 21.89 kg/m2        Blood Pressure from Last 3 Encounters:   17 (!) 138/91   17 126/86   17 131/89    Weight from Last 3 Encounters:   17 119 lb 11.2 oz (54.3 kg)   17 122 lb 8 oz (55.6 kg)   17 121 lb 14.4 oz (55.3 kg)              We Performed the Following     OTOLARYNGOLOGY REFERRAL        Primary Care Provider Office Phone # Fax #    Shivani Phelps PA-C [phone]       [facility] [address] [address]        Equal Access to Services     CHARLES NOLASCO : Hadii aad ku hadasho Soomaali, waaxda luqadaha, qaybta kaalmada adeegyada, ambika strong . So [facility] [phone].    ATENCIÓN: Si habla español, tiene a robertson disposición servicios gratuitos de asistencia lingüística. Llame al [phone].    We comply with applicable federal civil rights laws and [place] laws. We do not discriminate on the basis of race, color, national origin, age, disability sex, sexual orientation or gender identity.            Thank you!     Thank you for choosing [facility]  for your care. Our goal is always to provide you with excellent care. Hearing back from our patients is one way we can continue to improve our services. Please take a few minutes to complete the written survey that you may receive in the mail after your visit with us. Thank you!             Your " Updated Medication List - Protect others around you: Learn how to safely use, store and throw away your medicines at www.disposemymeds.org.          This list is accurate as of: 6/22/17  4:56 PM.  Always use your most recent med list.                   Brand Name Dispense Instructions for use Diagnosis    BENADRYL ALLERGY 25 MG tablet   Generic drug:  diphenhydrAMINE     56 tablet    Take 1 tablet (25 mg) by mouth nightly as needed        carbamide peroxide 6.5 % otic solution    DEBROX    30 mL    Place 5-10 drops into the right ear 2 times daily    Idiopathic aplastic anemia (H), Seasonal allergic rhinitis due to pollen       chlorpheniramine 4 MG tablet    CHLOR-TRIMETON     Take 4 mg by mouth every 6 hours as needed. For head cold        COMPRESSION STOCKINGS     2 each    Wear compression stockings at 20-30 mmHg rating most time during the day to the affected leg (left leg) or both legs. Take them off at night.    DVT (deep venous thrombosis), left, Postphlebitic syndrome       cycloSPORINE modified 100 MG capsule    GENERIC EQUIVALENT    120 capsule    Take 1 capsule (100 mg) by mouth 2 times daily Reported on 5/4/2017    Gastroesophageal reflux disease without esophagitis, Left-sided low back pain without sciatica, unspecified chronicity, Constipation, unspecified constipation type, Hemorrhoids, unspecified hemorrhoid type       diclofenac 1 % Gel topical gel    VOLTAREN    100 g    Apply 2 g topically 2 times daily    Myofascial pain       eltrombopag 50 MG tablet    PROMACTA    90 tablet    Take 3 tablets (150 mg) by mouth daily Administer on an empty stomach, 1 hour before or 2 hours after a meal. Or as directed    Idiopathic aplastic anemia (H), Pancytopenia (H)       * fluticasone 50 MCG/ACT spray    FLONASE    1 Bottle    Spray 1-2 sprays into both nostrils daily    Rhinitis medicamentosa, Bilateral impacted cerumen, Nasal congestion       * fluticasone 50 MCG/ACT spray    FLONASE    1 Bottle    Spray  1-2 sprays into both nostrils daily    Idiopathic aplastic anemia (H), Seasonal allergic rhinitis due to pollen       loratadine 10 MG tablet    CLARITIN     Take 10 mg by mouth daily as needed Reported on 4/26/2017        ondansetron 4 MG ODT tab    ZOFRAN-ODT    20 tablet    Take 1 tablet (4 mg) by mouth every 6 hours as needed for nausea    Nausea and vomiting, intractability of vomiting not specified, unspecified vomiting type       order for DME     1 Box    Equipment being ordered: knee high compression stockings- 18-20 mm    DVT prophylaxis       oxyCODONE 5 MG IR tablet    ROXICODONE    50 tablet    Take 1 tablet (5 mg) by mouth every 6 hours as needed for moderate to severe pain    Left-sided low back pain without sciatica, unspecified chronicity       pantoprazole 40 MG EC tablet    PROTONIX    60 tablet    Take 1 tablet (40 mg) by mouth 2 times daily    Gastroesophageal reflux disease without esophagitis, Left-sided low back pain without sciatica, unspecified chronicity, Constipation, unspecified constipation type, Hemorrhoids, unspecified hemorrhoid type       polyethylene glycol Packet    MIRALAX/GLYCOLAX    14 packet    Take 17 g by mouth daily as needed for constipation . If you start to have loose stools/diarrhea or multiple bowel movements, ok to hold this medication. Then resume if no bowel movement after 24-48hours.    Hemorrhoids, unspecified hemorrhoid type, Gastroesophageal reflux disease without esophagitis, Left-sided low back pain without sciatica, unspecified chronicity, Constipation, unspecified constipation type       pseudoePHEDrine 120 MG 12 hr tablet    SUDAFED     Take 120 mg by mouth every 12 hours        SENNA S 8.6-50 MG per tablet   Generic drug:  senna-docusate     100 tablet    Can take 1-2 tablets every other day    Left-sided low back pain without sciatica, unspecified chronicity, Constipation, unspecified constipation type, Gastroesophageal reflux disease without esophagitis,  Hemorrhoids, unspecified hemorrhoid type       sucralfate 1 GM/10ML suspension    CARAFATE    1200 mL    Take 10 mLs (1 g) by mouth 2 times daily as needed    Gastroesophageal reflux disease without esophagitis, Left-sided low back pain without sciatica, unspecified chronicity, Constipation, unspecified constipation type, Hemorrhoids, unspecified hemorrhoid type       TYLENOL PO      Take 325 mg by mouth every 6 hours as needed for mild pain or fever        * Notice:  This list has 2 medication(s) that are the same as other medications prescribed for you. Read the directions carefully, and ask your doctor or other care provider to review them with you.

## 2017-06-22 NOTE — NURSING NOTE
"Chief Complaint   Patient presents with     Follow Up For     Ear pain        Initial BP (!) 138/91 (BP Location: Left arm, Patient Position: Chair, Cuff Size: Adult Regular)  Pulse 88  Temp 98.4  F (36.9  C) (Tympanic)  Ht 5' 2\" (1.575 m)  Wt 119 lb 11.2 oz (54.3 kg)  SpO2 95%  BMI 21.89 kg/m2 Estimated body mass index is 21.89 kg/(m^2) as calculated from the following:    Height as of this encounter: 5' 2\" (1.575 m).    Weight as of this encounter: 119 lb 11.2 oz (54.3 kg)..    BP completed using cuff size: regular  MEDICATIONS REVIEWED  SOCIAL AND FAMILY HX REVIEWED  Melonie Randall CMA  "

## 2017-06-28 ENCOUNTER — INFUSION THERAPY VISIT (OUTPATIENT)
Dept: INFUSION THERAPY | Facility: CLINIC | Age: 72
End: 2017-06-28
Attending: PHYSICIAN ASSISTANT
Payer: COMMERCIAL

## 2017-06-28 ENCOUNTER — HOSPITAL ENCOUNTER (OUTPATIENT)
Facility: CLINIC | Age: 72
Setting detail: SPECIMEN
Discharge: HOME | End: 2017-06-28
Attending: INTERNAL MEDICINE | Admitting: PHYSICIAN ASSISTANT
Payer: COMMERCIAL

## 2017-06-28 VITALS
HEART RATE: 96 BPM | SYSTOLIC BLOOD PRESSURE: 100 MMHG | TEMPERATURE: 98 F | RESPIRATION RATE: 16 BRPM | DIASTOLIC BLOOD PRESSURE: 62 MMHG

## 2017-06-28 DIAGNOSIS — D61.3 IDIOPATHIC APLASTIC ANEMIA (H): Primary | ICD-10-CM

## 2017-06-28 DIAGNOSIS — D61.818 PANCYTOPENIA (H): ICD-10-CM

## 2017-06-28 LAB
ALBUMIN SERPL-MCNC: 3.2 G/DL (ref 3.4–5)
ALP SERPL-CCNC: 79 U/L (ref 40–150)
ALT SERPL W P-5'-P-CCNC: 24 U/L (ref 0–50)
ANION GAP SERPL CALCULATED.3IONS-SCNC: 10 MMOL/L (ref 3–14)
AST SERPL W P-5'-P-CCNC: 31 U/L (ref 0–45)
BASOPHILS # BLD AUTO: 0 10E9/L (ref 0–0.2)
BASOPHILS NFR BLD AUTO: 0 %
BILIRUB SERPL-MCNC: 2.2 MG/DL (ref 0.2–1.3)
BUN SERPL-MCNC: 32 MG/DL (ref 7–30)
CALCIUM SERPL-MCNC: 8.5 MG/DL (ref 8.5–10.1)
CHLORIDE SERPL-SCNC: 108 MMOL/L (ref 94–109)
CO2 SERPL-SCNC: 21 MMOL/L (ref 20–32)
CREAT SERPL-MCNC: 1.1 MG/DL (ref 0.52–1.04)
DIFFERENTIAL METHOD BLD: ABNORMAL
EOSINOPHIL # BLD AUTO: 0.1 10E9/L (ref 0–0.7)
EOSINOPHIL NFR BLD AUTO: 1.6 %
ERYTHROCYTE [DISTWIDTH] IN BLOOD BY AUTOMATED COUNT: 17.1 % (ref 10–15)
GFR SERPL CREATININE-BSD FRML MDRD: 49 ML/MIN/1.7M2
GLUCOSE SERPL-MCNC: 160 MG/DL (ref 70–99)
HCT VFR BLD AUTO: 23.1 % (ref 35–47)
HGB BLD-MCNC: 7.7 G/DL (ref 11.7–15.7)
IMM GRANULOCYTES # BLD: 0 10E9/L (ref 0–0.4)
IMM GRANULOCYTES NFR BLD: 0 %
LYMPHOCYTES # BLD AUTO: 2 10E9/L (ref 0.8–5.3)
LYMPHOCYTES NFR BLD AUTO: 53.6 %
MCH RBC QN AUTO: 40.3 PG (ref 26.5–33)
MCHC RBC AUTO-ENTMCNC: 33.3 G/DL (ref 31.5–36.5)
MCV RBC AUTO: 121 FL (ref 78–100)
MONOCYTES # BLD AUTO: 0.2 10E9/L (ref 0–1.3)
MONOCYTES NFR BLD AUTO: 6.6 %
NEUTROPHILS # BLD AUTO: 1.4 10E9/L (ref 1.6–8.3)
NEUTROPHILS NFR BLD AUTO: 38.2 %
PLATELET # BLD AUTO: 39 10E9/L (ref 150–450)
POTASSIUM SERPL-SCNC: 5.5 MMOL/L (ref 3.4–5.3)
PROT SERPL-MCNC: 7 G/DL (ref 6.8–8.8)
RBC # BLD AUTO: 1.91 10E12/L (ref 3.8–5.2)
SODIUM SERPL-SCNC: 139 MMOL/L (ref 133–144)
WBC # BLD AUTO: 3.6 10E9/L (ref 4–11)

## 2017-06-28 PROCEDURE — 85025 COMPLETE CBC W/AUTO DIFF WBC: CPT | Performed by: PHYSICIAN ASSISTANT

## 2017-06-28 PROCEDURE — 80053 COMPREHEN METABOLIC PANEL: CPT | Performed by: PHYSICIAN ASSISTANT

## 2017-06-28 PROCEDURE — 94642 AEROSOL INHALATION TREATMENT: CPT

## 2017-06-28 PROCEDURE — 80158 DRUG ASSAY CYCLOSPORINE: CPT | Performed by: PHYSICIAN ASSISTANT

## 2017-06-28 PROCEDURE — 25000125 ZZHC RX 250: Performed by: NURSE PRACTITIONER

## 2017-06-28 RX ORDER — PENTAMIDINE ISETHIONATE 300 MG/300MG
300 INHALANT RESPIRATORY (INHALATION) ONCE
Status: COMPLETED | OUTPATIENT
Start: 2017-06-28 | End: 2017-06-28

## 2017-06-28 RX ORDER — ALBUTEROL SULFATE 5 MG/ML
2.5 SOLUTION RESPIRATORY (INHALATION) ONCE
Status: CANCELLED
Start: 2017-07-19 | End: 2017-07-19

## 2017-06-28 RX ORDER — PENTAMIDINE ISETHIONATE 300 MG/300MG
300 INHALANT RESPIRATORY (INHALATION) ONCE
Status: CANCELLED
Start: 2017-07-19 | End: 2017-07-19

## 2017-06-28 RX ORDER — ALBUTEROL SULFATE 0.83 MG/ML
2.5 SOLUTION RESPIRATORY (INHALATION) ONCE
Status: COMPLETED | OUTPATIENT
Start: 2017-06-28 | End: 2017-06-28

## 2017-06-28 RX ADMIN — ALBUTEROL SULFATE 2.5 MG: 2.5 SOLUTION RESPIRATORY (INHALATION) at 09:31

## 2017-06-28 RX ADMIN — PENTAMIDINE ISETHIONATE 300 MG: 300 INHALANT RESPIRATORY (INHALATION) at 09:40

## 2017-06-28 ASSESSMENT — PAIN SCALES - GENERAL: PAINLEVEL: NO PAIN (0)

## 2017-06-28 NOTE — MR AVS SNAPSHOT
After Visit Summary   6/28/2017    Bonny Raymundo    MRN: [id number]           Patient Information     Date Of Birth          1945        Visit Information        Provider Department      6/28/2017 9:00 AM  INFUSION CHAIR 12 [facility]        Today's Diagnoses     Idiopathic aplastic anemia (H)    -  1    Pancytopenia (H)           Follow-ups after your visit        Your next 10 appointments already scheduled     Jul 05, 2017  1:30 PM CDT   Level 1 with  INFUSION CHAIR 2   [facility] ([facility])    [facility]  [address]  [place] [address]   [phone]            Jul 12, 2017  9:00 AM CDT   Level 5 with  INFUSION CHAIR 2   [facility] ([facility])    [facility]  [address]  [place] [address]   [phone]            Jul 13, 2017  4:00 PM CDT   RETURN ONC with Rafita Rogel MD   [facility] Blood and Marrow Trans[facility] ([facility])    [address] [address]   [phone]            Jul 19, 2017  1:00 PM CDT   Level 1 with  INFUSION CHAIR 15   [facility] ([facility])    [facility]  [address]  [place] [address]   [phone]            Jul 26, 2017  9:00 AM CDT   Level 5 with  INFUSION CHAIR 10   [facility] ([facility])    [facility]  [address]  [place] [address]   [phone]              Who to contact     If you have questions or need follow up information about today's clinic visit or your schedule please contact [facility] [place] directly at [phone].  Normal or non-critical lab and imaging results will be  "communicated to you by MyChart, letter or phone within 4 business days after the clinic has received the results. If you do not hear from us within 7 days, please contact the clinic through [employer] or phone. If you have a critical or abnormal lab result, we will notify you by phone as soon as possible.  Submit refill requests through [employer] or call your pharmacy and they will forward the refill request to us. Please allow 3 business days for your refill to be completed.          Additional Information About Your Visit        [employer] Information     [employer] lets you send messages to your doctor, view your test results, renew your prescriptions, schedule appointments and more. To sign up, go to [email or web address] . Click on \"Log in\" on the left side of the screen, which will take you to the Welcome page. Then click on \"Sign up Now\" on the right side of the page.     You will be asked to enter the access code listed below, as well as some personal information. Please follow the directions to create your username and password.     Your access code is: [email or web address]  Expires: 2017  9:26 AM     Your access code will  in 90 days. If you need help or a new code, please call your [place] clinic or [phone].        Care EveryWhere ID     This is your Care EveryWhere ID. This could be used by other organizations to access your [place] medical records  [id number]        Your Vitals Were     Pulse Temperature Respirations             96 98  F (36.7  C) (Oral) 16          Blood Pressure from Last 3 Encounters:   17 100/62   17 (!) 138/91   17 126/86    Weight from Last 3 Encounters:   17 54.3 kg (119 lb 11.2 oz)   17 55.6 kg (122 lb 8 oz)   17 55.3 kg (121 lb 14.4 oz)              We Performed the Following     CBC with platelets differential     Comprehensive metabolic panel     Cyclosporine        Primary Care Provider Office Phone # Fax #    Shivani Phelps PA-C " [phone]       [facility] [address] [facility] [address]  [facility] [address]        Equal Access to Services     CHARLES NOLASCO : Misty erica ahumada jeanna Hines, ni lynn, dayanta kashayan mahoney, ambika jorgensen laGénesisiliana levine. So [facility] [phone].    ATENCIÓN: Si habla español, tiene a robertson disposición servicios gratuitos de asistencia lingüística. Llame al [phone].    We comply with applicable federal civil rights laws and [place] laws. We do not discriminate on the basis of race, color, national origin, age, disability sex, sexual orientation or gender identity.            Thank you!     Thank you for choosing [facility]  for your care. Our goal is always to provide you with excellent care. Hearing back from our patients is one way we can continue to improve our services. Please take a few minutes to complete the written survey that you may receive in the mail after your visit with us. Thank you!             Your Updated Medication List - Protect others around you: Learn how to safely use, store and throw away your medicines at www.disposemymeds.org.          This list is accurate as of: 6/28/17 12:38 PM.  Always use your most recent med list.                   Brand Name Dispense Instructions for use Diagnosis    BENADRYL ALLERGY 25 MG tablet   Generic drug:  diphenhydrAMINE     56 tablet    Take 1 tablet (25 mg) by mouth nightly as needed        carbamide peroxide 6.5 % otic solution    DEBROX    30 mL    Place 5-10 drops into the right ear 2 times daily    Idiopathic aplastic anemia (H), Seasonal allergic rhinitis due to pollen       chlorpheniramine 4 MG tablet    CHLOR-TRIMETON     Take 4 mg by mouth every 6 hours as needed. For head cold        COMPRESSION STOCKINGS     2 each    Wear compression stockings at 20-30 mmHg rating most time during the day to the affected leg (left leg) or both legs. Take them off at night.    DVT (deep  venous thrombosis), left, Postphlebitic syndrome       cycloSPORINE modified 100 MG capsule    GENERIC EQUIVALENT    120 capsule    Take 1 capsule (100 mg) by mouth 2 times daily Reported on 5/4/2017    Gastroesophageal reflux disease without esophagitis, Left-sided low back pain without sciatica, unspecified chronicity, Constipation, unspecified constipation type, Hemorrhoids, unspecified hemorrhoid type       diclofenac 1 % Gel topical gel    VOLTAREN    100 g    Apply 2 g topically 2 times daily    Myofascial pain       eltrombopag 50 MG tablet    PROMACTA    90 tablet    Take 3 tablets (150 mg) by mouth daily Administer on an empty stomach, 1 hour before or 2 hours after a meal. Or as directed    Idiopathic aplastic anemia (H), Pancytopenia (H)       * fluticasone 50 MCG/ACT spray    FLONASE    1 Bottle    Spray 1-2 sprays into both nostrils daily    Rhinitis medicamentosa, Bilateral impacted cerumen, Nasal congestion       * fluticasone 50 MCG/ACT spray    FLONASE    1 Bottle    Spray 1-2 sprays into both nostrils daily    Idiopathic aplastic anemia (H), Seasonal allergic rhinitis due to pollen       loratadine 10 MG tablet    CLARITIN     Take 10 mg by mouth daily as needed Reported on 4/26/2017        ondansetron 4 MG ODT tab    ZOFRAN-ODT    20 tablet    Take 1 tablet (4 mg) by mouth every 6 hours as needed for nausea    Nausea and vomiting, intractability of vomiting not specified, unspecified vomiting type       order for DME     1 Box    Equipment being ordered: knee high compression stockings- 18-20 mm    DVT prophylaxis       oxyCODONE 5 MG IR tablet    ROXICODONE    50 tablet    Take 1 tablet (5 mg) by mouth every 6 hours as needed for moderate to severe pain    Left-sided low back pain without sciatica, unspecified chronicity       pantoprazole 40 MG EC tablet    PROTONIX    60 tablet    Take 1 tablet (40 mg) by mouth 2 times daily    Gastroesophageal reflux disease without esophagitis, Left-sided low  back pain without sciatica, unspecified chronicity, Constipation, unspecified constipation type, Hemorrhoids, unspecified hemorrhoid type       polyethylene glycol Packet    MIRALAX/GLYCOLAX    14 packet    Take 17 g by mouth daily as needed for constipation . If you start to have loose stools/diarrhea or multiple bowel movements, ok to hold this medication. Then resume if no bowel movement after 24-48hours.    Hemorrhoids, unspecified hemorrhoid type, Gastroesophageal reflux disease without esophagitis, Left-sided low back pain without sciatica, unspecified chronicity, Constipation, unspecified constipation type       pseudoePHEDrine 120 MG 12 hr tablet    SUDAFED     Take 120 mg by mouth every 12 hours        SENNA S 8.6-50 MG per tablet   Generic drug:  senna-docusate     100 tablet    Can take 1-2 tablets every other day    Left-sided low back pain without sciatica, unspecified chronicity, Constipation, unspecified constipation type, Gastroesophageal reflux disease without esophagitis, Hemorrhoids, unspecified hemorrhoid type       sucralfate 1 GM/10ML suspension    CARAFATE    1200 mL    Take 10 mLs (1 g) by mouth 2 times daily as needed    Gastroesophageal reflux disease without esophagitis, Left-sided low back pain without sciatica, unspecified chronicity, Constipation, unspecified constipation type, Hemorrhoids, unspecified hemorrhoid type       TYLENOL PO      Take 325 mg by mouth every 6 hours as needed for mild pain or fever        * Notice:  This list has 2 medication(s) that are the same as other medications prescribed for you. Read the directions carefully, and ask your doctor or other care provider to review them with you.

## 2017-06-28 NOTE — PROGRESS NOTES
Infusion Nursing Note:  Bonny Raymundo presents today for labs, dressing change, pentamadine.    Patient seen by provider today: No   present during visit today: Not Applicable.    Note: N/A.    Intravenous Access:  Labs drawn without difficulty.  PICC.    Treatment Conditions:  Lab Results   Component Value Date    HGB 7.7 06/28/2017     Lab Results   Component Value Date    WBC 3.6 06/28/2017      Lab Results   Component Value Date    ANEU 1.5 06/14/2017     Lab Results   Component Value Date    PLT 39 06/28/2017      Lab Results   Component Value Date     06/28/2017                   Lab Results   Component Value Date    POTASSIUM 5.5 06/28/2017           Lab Results   Component Value Date    MAG 1.7 05/17/2017            Lab Results   Component Value Date    CR 1.10 06/28/2017                   Lab Results   Component Value Date    LEO 8.5 06/28/2017                Lab Results   Component Value Date    BILITOTAL 2.2 06/28/2017           Lab Results   Component Value Date    ALBUMIN 3.2 06/28/2017                    Lab Results   Component Value Date    ALT 24 06/28/2017           Lab Results   Component Value Date    AST 31 06/28/2017     Results reviewed, labs did NOT meet treatment parameters: Hgb 7.7 and Plts 39.      Post Infusion Assessment:  Patient tolerated infusion without incident.  Blood return noted pre and post infusion.  Site patent and intact, free from redness, edema or discomfort.  No evidence of extravasations.    Discharge Plan:   Copy of AVS reviewed with patient and/or family.  Patient will return in 1 week for next appointment.  Patient discharged in stable condition accompanied by: self.  Departure Mode: Ambulatory.    Guerline Robles RN

## 2017-06-29 LAB
CYCLOSPORINE BLD LC/MS/MS-MCNC: 208 UG/L (ref 50–400)
TME LAST DOSE: NORMAL H

## 2017-06-30 ENCOUNTER — TRANSFERRED RECORDS (OUTPATIENT)
Dept: HEALTH INFORMATION MANAGEMENT | Facility: CLINIC | Age: 72
End: 2017-06-30

## 2017-07-06 ENCOUNTER — TELEPHONE (OUTPATIENT)
Dept: ONCOLOGY | Facility: CLINIC | Age: 72
End: 2017-07-06

## 2017-07-06 ENCOUNTER — INFUSION THERAPY VISIT (OUTPATIENT)
Dept: INFUSION THERAPY | Facility: CLINIC | Age: 72
End: 2017-07-06
Attending: INTERNAL MEDICINE
Payer: COMMERCIAL

## 2017-07-06 DIAGNOSIS — D61.3 IDIOPATHIC APLASTIC ANEMIA (H): Primary | ICD-10-CM

## 2017-07-06 PROCEDURE — 96523 IRRIG DRUG DELIVERY DEVICE: CPT

## 2017-07-06 NOTE — MR AVS SNAPSHOT
After Visit Summary   7/6/2017    Bonny Raymundo    MRN: [id number]           Patient Information     Date Of Birth          1945        Visit Information        Provider Department      7/6/2017 1:45 PM  INFUSION CHAIR 14 [facility]        Today's Diagnoses     Idiopathic aplastic anemia (H)    -  1       Follow-ups after your visit        Your next 10 appointments already scheduled     Jul 12, 2017  9:00 AM CDT   Level 5 with  INFUSION CHAIR 2   [facility] ([facility])    [facility] [place] [id number]  [place] [address]   [phone]            Jul 13, 2017  4:00 PM CDT   RETURN ONC with Rafita Rogel MD   [facility] Blood and Marrow Transplant ([facility])    [address] [place] [address]   [phone]            Jul 19, 2017  1:00 PM CDT   Level 1 with  INFUSION CHAIR 15   [facility] ([facility])    [facility]  [address]  [place] [address]   [phone]            Jul 26, 2017  9:00 AM CDT   Level 5 with  INFUSION CHAIR 10   [facility] ([facility])    [facility]  [address]  [place] [address]   [phone]              Who to contact     If you have questions or need follow up information about today's clinic visit or your schedule please contact [facility] [place] directly at [phone].  Normal or non-critical lab and imaging results will be communicated to you by MyChart, letter or phone within 4 business days after the clinic has received the results. If you do not hear from us within 7 days, please contact the clinic through MyChart or phone. If you have a critical or abnormal lab result, we will notify  "you by phone as soon as possible.  Submit refill requests through [employer] or call your pharmacy and they will forward the refill request to us. Please allow 3 business days for your refill to be completed.          Additional Information About Your Visit        nanoThericsharIf You Can Information     [employer] lets you send messages to your doctor, view your test results, renew your prescriptions, schedule appointments and more. To sign up, go to [email or web address] . Click on \"Log in\" on the left side of the screen, which will take you to the Welcome page. Then click on \"Sign up Now\" on the right side of the page.     You will be asked to enter the access code listed below, as well as some personal information. Please follow the directions to create your username and password.     Your access code is: [email or web address]  Expires: 2017  9:26 AM     Your access code will  in 90 days. If you need help or a new code, please call your [facility] clinic or [phone].        Care EveryWhere ID     This is your Care EveryWhere ID. This could be used by other organizations to access your [facility] medical records  [id number]         Blood Pressure from Last 3 Encounters:   17 100/62   17 (!) 138/91   17 126/86    Weight from Last 3 Encounters:   17 54.3 kg (119 lb 11.2 oz)   17 55.6 kg (122 lb 8 oz)   17 55.3 kg (121 lb 14.4 oz)              Today, you had the following     No orders found for display       Primary Care Provider Office Phone # Fax #    Shivani Phelps PA-C [phone]       [facility] [address]  [facility] [address]        Equal Access to Services     CHARLES NOLASCO : Misty meeks Somichael, waaxda luqadaha, qaybta kaalmada danayalandy, ambika levine. So [facility] [phone].    ATENCIÓN: Si habla español, tiene a robertson disposición servicios gratuitos de asistencia lingüística. Llame al [phone].    We comply with " applicable federal civil rights laws and [place] laws. We do not discriminate on the basis of race, color, national origin, age, disability sex, sexual orientation or gender identity.            Thank you!     Thank you for choosing [facility]  for your care. Our goal is always to provide you with excellent care. Hearing back from our patients is one way we can continue to improve our services. Please take a few minutes to complete the written survey that you may receive in the mail after your visit with us. Thank you!             Your Updated Medication List - Protect others around you: Learn how to safely use, store and throw away your medicines at www.disposemymeds.org.          This list is accurate as of: 7/6/17  2:15 PM.  Always use your most recent med list.                   Brand Name Dispense Instructions for use Diagnosis    BENADRYL ALLERGY 25 MG tablet   Generic drug:  diphenhydrAMINE     56 tablet    Take 1 tablet (25 mg) by mouth nightly as needed        carbamide peroxide 6.5 % otic solution    DEBROX    30 mL    Place 5-10 drops into the right ear 2 times daily    Idiopathic aplastic anemia (H), Seasonal allergic rhinitis due to pollen       chlorpheniramine 4 MG tablet    CHLOR-TRIMETON     Take 4 mg by mouth every 6 hours as needed. For head cold        COMPRESSION STOCKINGS     2 each    Wear compression stockings at 20-30 mmHg rating most time during the day to the affected leg (left leg) or both legs. Take them off at night.    DVT (deep venous thrombosis), left, Postphlebitic syndrome       cycloSPORINE modified 100 MG capsule    GENERIC EQUIVALENT    120 capsule    Take 1 capsule (100 mg) by mouth 2 times daily Reported on 5/4/2017    Gastroesophageal reflux disease without esophagitis, Left-sided low back pain without sciatica, unspecified chronicity, Constipation, unspecified constipation type, Hemorrhoids, unspecified hemorrhoid type       diclofenac 1 %  Gel topical gel    VOLTAREN    100 g    Apply 2 g topically 2 times daily    Myofascial pain       eltrombopag 50 MG tablet    PROMACTA    90 tablet    Take 3 tablets (150 mg) by mouth daily Administer on an empty stomach, 1 hour before or 2 hours after a meal. Or as directed    Idiopathic aplastic anemia (H), Pancytopenia (H)       * fluticasone 50 MCG/ACT spray    FLONASE    1 Bottle    Spray 1-2 sprays into both nostrils daily    Rhinitis medicamentosa, Bilateral impacted cerumen, Nasal congestion       * fluticasone 50 MCG/ACT spray    FLONASE    1 Bottle    Spray 1-2 sprays into both nostrils daily    Idiopathic aplastic anemia (H), Seasonal allergic rhinitis due to pollen       loratadine 10 MG tablet    CLARITIN     Take 10 mg by mouth daily as needed Reported on 4/26/2017        ondansetron 4 MG ODT tab    ZOFRAN-ODT    20 tablet    Take 1 tablet (4 mg) by mouth every 6 hours as needed for nausea    Nausea and vomiting, intractability of vomiting not specified, unspecified vomiting type       order for DME     1 Box    Equipment being ordered: knee high compression stockings- 18-20 mm    DVT prophylaxis       oxyCODONE 5 MG IR tablet    ROXICODONE    50 tablet    Take 1 tablet (5 mg) by mouth every 6 hours as needed for moderate to severe pain    Left-sided low back pain without sciatica, unspecified chronicity       pantoprazole 40 MG EC tablet    PROTONIX    60 tablet    Take 1 tablet (40 mg) by mouth 2 times daily    Gastroesophageal reflux disease without esophagitis, Left-sided low back pain without sciatica, unspecified chronicity, Constipation, unspecified constipation type, Hemorrhoids, unspecified hemorrhoid type       polyethylene glycol Packet    MIRALAX/GLYCOLAX    14 packet    Take 17 g by mouth daily as needed for constipation . If you start to have loose stools/diarrhea or multiple bowel movements, ok to hold this medication. Then resume if no bowel movement after 24-48hours.    Hemorrhoids,  unspecified hemorrhoid type, Gastroesophageal reflux disease without esophagitis, Left-sided low back pain without sciatica, unspecified chronicity, Constipation, unspecified constipation type       pseudoePHEDrine 120 MG 12 hr tablet    SUDAFED     Take 120 mg by mouth every 12 hours        SENNA S 8.6-50 MG per tablet   Generic drug:  senna-docusate     100 tablet    Can take 1-2 tablets every other day    Left-sided low back pain without sciatica, unspecified chronicity, Constipation, unspecified constipation type, Gastroesophageal reflux disease without esophagitis, Hemorrhoids, unspecified hemorrhoid type       sucralfate 1 GM/10ML suspension    CARAFATE    1200 mL    Take 10 mLs (1 g) by mouth 2 times daily as needed    Gastroesophageal reflux disease without esophagitis, Left-sided low back pain without sciatica, unspecified chronicity, Constipation, unspecified constipation type, Hemorrhoids, unspecified hemorrhoid type       TYLENOL PO      Take 325 mg by mouth every 6 hours as needed for mild pain or fever        * Notice:  This list has 2 medication(s) that are the same as other medications prescribed for you. Read the directions carefully, and ask your doctor or other care provider to review them with you.

## 2017-07-06 NOTE — PROGRESS NOTES
Infusion Nursing Note:  Bonny Raymundo presents today for PICC dressing change and flush only. No labs per patient wanted today. Trying to do every other week..    Patient seen by provider today: No   present during visit today: Not Applicable.    Note: No concerns or changes to report.    Intravenous Access:  PICC.    Treatment Conditions:  Not Applicable.      Post Infusion Assessment:  Blood return noted pre and post infusion.  Site patent and intact, free from redness, edema or discomfort.  No evidence of extravasations.    Discharge Plan:   Discharge instructions reviewed with: Patient.  Patient and/or family verbalized understanding of discharge instructions and all questions answered.  Copy of AVS reviewed with patient and/or family.  Patient will return one week for next appointment.  Patient discharged in stable condition accompanied by: self.  Departure Mode: Ambulatory.    Leidy Tavera RN

## 2017-07-06 NOTE — TELEPHONE ENCOUNTER
----- Message from Rafita Rogel MD sent at 7/6/2017 10:55 AM CDT -----  Ok but she should remind the doctor that she has abnormal blood counts.    DW  ----- Message -----     From: Jennie Carmona RN     Sent: 7/6/2017   9:33 AM       To: Rafita Rogel MD, #    Pt asking if OK to schedule a myringotomy for herself.

## 2017-07-12 ENCOUNTER — INFUSION THERAPY VISIT (OUTPATIENT)
Dept: INFUSION THERAPY | Facility: CLINIC | Age: 72
End: 2017-07-12
Attending: INTERNAL MEDICINE
Payer: COMMERCIAL

## 2017-07-12 ENCOUNTER — HOSPITAL ENCOUNTER (OUTPATIENT)
Facility: CLINIC | Age: 72
Setting detail: SPECIMEN
Discharge: HOME | End: 2017-07-12
Attending: INTERNAL MEDICINE | Admitting: INTERNAL MEDICINE
Payer: COMMERCIAL

## 2017-07-12 DIAGNOSIS — D61.818 PANCYTOPENIA (H): ICD-10-CM

## 2017-07-12 DIAGNOSIS — D61.3 IDIOPATHIC APLASTIC ANEMIA (H): ICD-10-CM

## 2017-07-12 LAB
ALBUMIN SERPL-MCNC: 3.2 G/DL (ref 3.4–5)
ALP SERPL-CCNC: 72 U/L (ref 40–150)
ALT SERPL W P-5'-P-CCNC: 22 U/L (ref 0–50)
ANION GAP SERPL CALCULATED.3IONS-SCNC: 9 MMOL/L (ref 3–14)
AST SERPL W P-5'-P-CCNC: 29 U/L (ref 0–45)
BASOPHILS # BLD AUTO: 0 10E9/L (ref 0–0.2)
BASOPHILS NFR BLD AUTO: 0 %
BILIRUB SERPL-MCNC: 1.8 MG/DL (ref 0.2–1.3)
BUN SERPL-MCNC: 31 MG/DL (ref 7–30)
CALCIUM SERPL-MCNC: 8.6 MG/DL (ref 8.5–10.1)
CHLORIDE SERPL-SCNC: 108 MMOL/L (ref 94–109)
CO2 SERPL-SCNC: 20 MMOL/L (ref 20–32)
CREAT SERPL-MCNC: 1.31 MG/DL (ref 0.52–1.04)
CYCLOSPORINE BLD LC/MS/MS-MCNC: 204 UG/L (ref 50–400)
DIFFERENTIAL METHOD BLD: ABNORMAL
EOSINOPHIL # BLD AUTO: 0.1 10E9/L (ref 0–0.7)
EOSINOPHIL NFR BLD AUTO: 1.4 %
ERYTHROCYTE [DISTWIDTH] IN BLOOD BY AUTOMATED COUNT: 15.6 % (ref 10–15)
GFR SERPL CREATININE-BSD FRML MDRD: 40 ML/MIN/1.7M2
GLUCOSE SERPL-MCNC: 141 MG/DL (ref 70–99)
HCT VFR BLD AUTO: 22.8 % (ref 35–47)
HGB BLD-MCNC: 7.9 G/DL (ref 11.7–15.7)
IMM GRANULOCYTES # BLD: 0 10E9/L (ref 0–0.4)
IMM GRANULOCYTES NFR BLD: 0 %
LYMPHOCYTES # BLD AUTO: 2 10E9/L (ref 0.8–5.3)
LYMPHOCYTES NFR BLD AUTO: 47.1 %
MCH RBC QN AUTO: 41.1 PG (ref 26.5–33)
MCHC RBC AUTO-ENTMCNC: 34.6 G/DL (ref 31.5–36.5)
MCV RBC AUTO: 119 FL (ref 78–100)
MONOCYTES # BLD AUTO: 0.3 10E9/L (ref 0–1.3)
MONOCYTES NFR BLD AUTO: 6.4 %
NEUTROPHILS # BLD AUTO: 1.9 10E9/L (ref 1.6–8.3)
NEUTROPHILS NFR BLD AUTO: 45.1 %
NRBC # BLD AUTO: 0 10*3/UL
NRBC BLD AUTO-RTO: 0 /100
PLATELET # BLD AUTO: 40 10E9/L (ref 150–450)
POTASSIUM SERPL-SCNC: 5.9 MMOL/L (ref 3.4–5.3)
PROT SERPL-MCNC: 7 G/DL (ref 6.8–8.8)
RBC # BLD AUTO: 1.92 10E12/L (ref 3.8–5.2)
SODIUM SERPL-SCNC: 137 MMOL/L (ref 133–144)
TME LAST DOSE: NORMAL H
WBC # BLD AUTO: 4.3 10E9/L (ref 4–11)

## 2017-07-12 PROCEDURE — 80053 COMPREHEN METABOLIC PANEL: CPT | Performed by: INTERNAL MEDICINE

## 2017-07-12 PROCEDURE — 36592 COLLECT BLOOD FROM PICC: CPT

## 2017-07-12 PROCEDURE — 85025 COMPLETE CBC W/AUTO DIFF WBC: CPT | Performed by: INTERNAL MEDICINE

## 2017-07-12 PROCEDURE — 80158 DRUG ASSAY CYCLOSPORINE: CPT | Performed by: PHYSICIAN ASSISTANT

## 2017-07-12 NOTE — MR AVS SNAPSHOT
After Visit Summary   7/12/2017    Bonny Raymundo    MRN: [id number]           Patient Information     Date Of Birth          1945        Visit Information        Provider Department      7/12/2017 9:00 AM  INFUSION CHAIR 2 [facility]        Today's Diagnoses     Pancytopenia (H)        Idiopathic aplastic anemia (H)           Follow-ups after your visit        Your next 10 appointments already scheduled     Jul 13, 2017  4:00 PM CDT   RETURN ONC with Rafita Rogel MD   [facility] Blood and Marrow Transplant ([facility])    [address] [place] [address]  2nd [facility] [address]   [phone]            Jul 19, 2017  1:00 PM CDT   Level 1 with  INFUSION CHAIR 15   [facility] ([facility])    [facility]  [address]  [facility] [address]   [phone]            Jul 26, 2017  9:00 AM CDT   Level 5 with  INFUSION CHAIR 10   [facility] ([facility])    [facility]  [address]  [facility] [address]   [phone]              Who to contact     If you have questions or need follow up information about today's clinic visit or your schedule please contact [facility] [place] directly at [phone].  Normal or non-critical lab and imaging results will be communicated to you by MyChart, letter or phone within 4 business days after the clinic has received the results. If you do not hear from us within 7 days, please contact the clinic through MyChart or phone. If you have a critical or abnormal lab result, we will notify you by phone as soon as possible.  Submit refill requests through [employer] or call your pharmacy and they will forward the refill request to us. Please allow 3 business days for your refill to be completed.          Additional  "Information About Your Visit        MyChart Information     [employer] lets you send messages to your doctor, view your test results, renew your prescriptions, schedule appointments and more. To sign up, go to [email or web address] . Click on \"Log in\" on the left side of the screen, which will take you to the Welcome page. Then click on \"Sign up Now\" on the right side of the page.     You will be asked to enter the access code listed below, as well as some personal information. Please follow the directions to create your username and password.     Your access code is: [email or web address]  Expires: 2017  9:26 AM     Your access code will  in 90 days. If you need help or a new code, please call your [facility] clinic or [phone].        Care EveryWhere ID     This is your Care EveryWhere ID. This could be used by other organizations to access your [facility] medical records  [id number]         Blood Pressure from Last 3 Encounters:   17 100/62   17 (!) 138/91   17 126/86    Weight from Last 3 Encounters:   17 54.3 kg (119 lb 11.2 oz)   17 55.6 kg (122 lb 8 oz)   17 55.3 kg (121 lb 14.4 oz)              We Performed the Following     CBC with platelets differential     Comprehensive metabolic panel     Cyclosporine        Primary Care Provider Office Phone # Fax #    Shivani Phelps PA-C [phone]       [facility] [address] [facility] [address]  [facility] [address]        Equal Access to Services     [facility] : Hadii aad ku hadasho Soomaali, waaxda luqadaha, qaybta kaalmada denzel, ambika levine. So [facility] [phone].    ATENCIÓN: Si habla español, tiene a robertson disposición servicios gratuitos de asistencia lingüística. Llame al [phone].    We comply with applicable federal civil rights laws and [place] laws. We do not discriminate on the basis of race, color, national origin, age, disability sex, sexual orientation or " gender identity.            Thank you!     Thank you for choosing [facility]  for your care. Our goal is always to provide you with excellent care. Hearing back from our patients is one way we can continue to improve our services. Please take a few minutes to complete the written survey that you may receive in the mail after your visit with us. Thank you!             Your Updated Medication List - Protect others around you: Learn how to safely use, store and throw away your medicines at www.disposemymeds.org.          This list is accurate as of: 7/12/17 10:10 AM.  Always use your most recent med list.                   Brand Name Dispense Instructions for use Diagnosis    BENADRYL ALLERGY 25 MG tablet   Generic drug:  diphenhydrAMINE     56 tablet    Take 1 tablet (25 mg) by mouth nightly as needed        carbamide peroxide 6.5 % otic solution    DEBROX    30 mL    Place 5-10 drops into the right ear 2 times daily    Idiopathic aplastic anemia (H), Seasonal allergic rhinitis due to pollen       chlorpheniramine 4 MG tablet    CHLOR-TRIMETON     Take 4 mg by mouth every 6 hours as needed. For head cold        COMPRESSION STOCKINGS     2 each    Wear compression stockings at 20-30 mmHg rating most time during the day to the affected leg (left leg) or both legs. Take them off at night.    DVT (deep venous thrombosis), left, Postphlebitic syndrome       cycloSPORINE modified 100 MG capsule    GENERIC EQUIVALENT    120 capsule    Take 1 capsule (100 mg) by mouth 2 times daily Reported on 5/4/2017    Gastroesophageal reflux disease without esophagitis, Left-sided low back pain without sciatica, unspecified chronicity, Constipation, unspecified constipation type, Hemorrhoids, unspecified hemorrhoid type       diclofenac 1 % Gel topical gel    VOLTAREN    100 g    Apply 2 g topically 2 times daily    Myofascial pain       eltrombopag 50 MG tablet    PROMACTA    90 tablet    Take 3 tablets  (150 mg) by mouth daily Administer on an empty stomach, 1 hour before or 2 hours after a meal. Or as directed    Idiopathic aplastic anemia (H), Pancytopenia (H)       * fluticasone 50 MCG/ACT spray    FLONASE    1 Bottle    Spray 1-2 sprays into both nostrils daily    Rhinitis medicamentosa, Bilateral impacted cerumen, Nasal congestion       * fluticasone 50 MCG/ACT spray    FLONASE    1 Bottle    Spray 1-2 sprays into both nostrils daily    Idiopathic aplastic anemia (H), Seasonal allergic rhinitis due to pollen       loratadine 10 MG tablet    CLARITIN     Take 10 mg by mouth daily as needed Reported on 4/26/2017        ondansetron 4 MG ODT tab    ZOFRAN-ODT    20 tablet    Take 1 tablet (4 mg) by mouth every 6 hours as needed for nausea    Nausea and vomiting, intractability of vomiting not specified, unspecified vomiting type       order for DME     1 Box    Equipment being ordered: knee high compression stockings- 18-20 mm    DVT prophylaxis       oxyCODONE 5 MG IR tablet    ROXICODONE    50 tablet    Take 1 tablet (5 mg) by mouth every 6 hours as needed for moderate to severe pain    Left-sided low back pain without sciatica, unspecified chronicity       pantoprazole 40 MG EC tablet    PROTONIX    60 tablet    Take 1 tablet (40 mg) by mouth 2 times daily    Gastroesophageal reflux disease without esophagitis, Left-sided low back pain without sciatica, unspecified chronicity, Constipation, unspecified constipation type, Hemorrhoids, unspecified hemorrhoid type       polyethylene glycol Packet    MIRALAX/GLYCOLAX    14 packet    Take 17 g by mouth daily as needed for constipation . If you start to have loose stools/diarrhea or multiple bowel movements, ok to hold this medication. Then resume if no bowel movement after 24-48hours.    Hemorrhoids, unspecified hemorrhoid type, Gastroesophageal reflux disease without esophagitis, Left-sided low back pain without sciatica, unspecified chronicity, Constipation,  unspecified constipation type       pseudoePHEDrine 120 MG 12 hr tablet    SUDAFED     Take 120 mg by mouth every 12 hours        SENNA S 8.6-50 MG per tablet   Generic drug:  senna-docusate     100 tablet    Can take 1-2 tablets every other day    Left-sided low back pain without sciatica, unspecified chronicity, Constipation, unspecified constipation type, Gastroesophageal reflux disease without esophagitis, Hemorrhoids, unspecified hemorrhoid type       sucralfate 1 GM/10ML suspension    CARAFATE    1200 mL    Take 10 mLs (1 g) by mouth 2 times daily as needed    Gastroesophageal reflux disease without esophagitis, Left-sided low back pain without sciatica, unspecified chronicity, Constipation, unspecified constipation type, Hemorrhoids, unspecified hemorrhoid type       TYLENOL PO      Take 325 mg by mouth every 6 hours as needed for mild pain or fever        * Notice:  This list has 2 medication(s) that are the same as other medications prescribed for you. Read the directions carefully, and ask your doctor or other care provider to review them with you.

## 2017-07-12 NOTE — PROGRESS NOTES
Infusion Nursing Note:  Bonny Raymundo presents today for labs and possible transfusion.    Patient seen by provider today: No   present during visit today: Not Applicable.    Note: N/A.    Intravenous Access:  PICC.    Treatment Conditions:  Lab Results   Component Value Date    HGB 7.9 07/12/2017     Lab Results   Component Value Date    WBC 4.3 07/12/2017      Lab Results   Component Value Date    ANEU 1.9 07/12/2017     Lab Results   Component Value Date    PLT 40 07/12/2017      Lab Results   Component Value Date     07/12/2017                   Lab Results   Component Value Date    POTASSIUM 5.9 07/12/2017           Lab Results   Component Value Date    MAG 1.7 05/17/2017            Lab Results   Component Value Date    CR 1.31 07/12/2017                   Lab Results   Component Value Date    LEO 8.6 07/12/2017                Lab Results   Component Value Date    BILITOTAL 1.8 07/12/2017           Lab Results   Component Value Date    ALBUMIN 3.2 07/12/2017                    Lab Results   Component Value Date    ALT 22 07/12/2017           Lab Results   Component Value Date    AST 29 07/12/2017     Results reviewed, labs did NOT meet treatment parameters: hgb 7.9, plt 40, potassium 5.9. Pt refused peripheral blood draw.      Post Infusion Assessment:  Site patent and intact, free from redness, edema or discomfort.  No evidence of extravasations.  Access discontinued per protocol.    Discharge Plan:   Patient and/or family verbalized understanding of discharge instructions and all questions answered.  Copy of AVS reviewed with patient and/or family.  Patient will return next wednesday for next appointment.  Patient discharged in stable condition accompanied by: self.  Departure Mode: Ambulatory.    Laisha Jeffery RN

## 2017-07-13 ENCOUNTER — OFFICE VISIT (OUTPATIENT)
Dept: TRANSPLANT | Facility: CLINIC | Age: 72
End: 2017-07-13
Attending: INTERNAL MEDICINE
Payer: COMMERCIAL

## 2017-07-13 VITALS
HEART RATE: 85 BPM | WEIGHT: 117.9 LBS | DIASTOLIC BLOOD PRESSURE: 82 MMHG | BODY MASS INDEX: 21.56 KG/M2 | RESPIRATION RATE: 14 BRPM | TEMPERATURE: 97.2 F | OXYGEN SATURATION: 98 % | SYSTOLIC BLOOD PRESSURE: 145 MMHG

## 2017-07-13 DIAGNOSIS — D61.9 APLASTIC ANEMIA (H): Primary | ICD-10-CM

## 2017-07-13 PROCEDURE — 99212 OFFICE O/P EST SF 10 MIN: CPT | Mod: ZF

## 2017-07-13 RX ORDER — CYCLOSPORINE 25 MG/1
25 CAPSULE, LIQUID FILLED ORAL 2 TIMES DAILY
Qty: 540 CAPSULE | COMMUNITY
Start: 2017-07-13 | End: 2017-12-14

## 2017-07-13 NOTE — NURSING NOTE
"Oncology Rooming Note    July 13, 2017 4:51 PM   Bonny Raymundo is a 71 year old female who presents for:    Chief Complaint   Patient presents with     Oncology Clinic Visit     Initial Vitals: /82  Pulse 85  Temp 97.2  F (36.2  C) (Oral)  Resp 14  Wt 53.5 kg (117 lb 14.4 oz)  SpO2 98%  BMI 21.56 kg/m2 Estimated body mass index is 21.56 kg/(m^2) as calculated from the following:    Height as of 6/22/17: 1.575 m (5' 2\").    Weight as of this encounter: 53.5 kg (117 lb 14.4 oz). Body surface area is 1.53 meters squared.  Data Unavailable Comment: Data Unavailable   No LMP recorded. Patient has had a hysterectomy.  Allergies reviewed: Yes  Medications reviewed: Yes    Medications: Medication refills not needed today.  Pharmacy name entered into [email or web address]:    [facility], [place] - [address]  [facility] [place], [place] - [address]  [facility] [place], [place] - [address] [place] [address]. [address]  WRITTEN PRESCRIPTION REQUESTED    Clinical concerns:      6 minutes for nursing intake (face to face time)     Kayla Burger CMA              "

## 2017-07-13 NOTE — LETTER
7/13/2017      RE: Bonny Raymundo  [address]  [facility] [address]       /82  Pulse 85  Temp 97.2  F (36.2  C) (Oral)  Resp 14  Wt 53.5 kg (117 lb 14.4 oz)  SpO2 98%  BMI 21.56 kg/m2  Wt Readings from Last 4 Encounters:   07/13/17 53.5 kg (117 lb 14.4 oz)   06/22/17 54.3 kg (119 lb 11.2 oz)   06/01/17 55.6 kg (122 lb 8 oz)   05/31/17 55.3 kg (121 lb 14.4 oz)     Yokasta returns to follow-up her aplastic anemia. She's now been taking cyclosporine and he'll tremble PEG for a number of months. She's had no need for transfusions in several months and her blood counts have stabilized with her hemoglobin approximately 8 her neutrophils [id number] and her platelets in the 30-40,000 range. She's had no excess external bleeding or bruising. She's had no notable recent infections but she did have up PE tube placed in her right ear for ear stuffiness earlier this week. Air was a bit of drainage apparently at the tube placement and maybe it's a bit better since. She still has a stuffy nose. Her appetite is not great though no particular foods bother her in particular. She does feel full easily and thus is eating less. She's had no particular abdominal pain nausea or heartburn. Previously when she was taking more pain medication she had some tendency to constipation but with no laxatives and all she now has loose stools 1-3 times daily several days per week. She cannot attribute it to any particular foods and it doesn't feel like an infection in that it doesn't wake her at night and is not explosive. She has no new respiratory cardiac or neurologic symptoms and the rest of her review of systems is unrevealing.    Her exam shows her pale but in no acute distress. She has a few scattered bruises but no petechiae. There is no focal bone tenderness at any site. She has no cervical supraclavicular axillary or inguinal lymphadenopathy. Her oropharynx shows no mucosal lesions. The tube is present in her right  eardrum and there is no drainage or surrounding erythema. Her left external ear and the tympanic membrane look fine. Her lungs are clear without rales or wheezes. Her heart tones are regular there is no gallop. Her abdomen is soft and nontender without hepatosplenomegaly or masses.    Laboratory testing showed basically stable but low blood counts over some time and as noted she's not needed transfusions in 2 months.    Her serum creatinine is up a bit as is her systolic blood pressure, but her cyclosporine levels acceptable at just over 200 and we'll make no changes.    At this point I think it is best to continue her cyclosporine and eltrombopag at the same doses and we can spread out her intervals of checking every to every 3 weeks. Because she is, at least for now, free of transfusion dependence, I also suggested she could have her PICC line removed so she wouldn't need the dressing change as frequently and wouldn't be at risk for the infection or clotting hazards that come with a PICC line. It could not be arranged here at the [facility] today as it was late in the afternoon but will have it removed soon. She is aware that if transfusion dependence recurs we will have to replace it and that she'll need venipuncture for checking her blood counts.    At this point I suggested she return every 3 weeks for a brief visit and lab checks and if her blood counts stabilize we may extend the interval subsequently to monthly. She'll still need monitoring of her blood pressure kidney function and cyclosporine levels.    She knows to call if additional issues arise.    Rafita Palencia. of medicine    Results for LISSETH PARIKH (MRN [id number]) as of 7/13/2017 20:56   Ref. Range 7/12/2017 09:23   Sodium Latest Ref Range: 133 - 144 mmol/L 137   Potassium Latest Ref Range: 3.4 - 5.3 mmol/L 5.9 (H)   Chloride Latest Ref Range: 94 - 109 mmol/L 108   Carbon Dioxide Latest Ref Range: 20 - 32 mmol/L 20   Urea Nitrogen  Latest Ref Range: 7 - 30 mg/dL 31 (H)   Creatinine Latest Ref Range: 0.52 - 1.04 mg/dL 1.31 (H)   GFR Estimate Latest Ref Range: >60 mL/min/1.7m2 40 (L)   GFR Estimate If Black Latest Ref Range: >60 mL/min/1.7m2 48 (L)   Calcium Latest Ref Range: 8.5 - 10.1 mg/dL 8.6   Anion Gap Latest Ref Range: 3 - 14 mmol/L 9   Albumin Latest Ref Range: 3.4 - 5.0 g/dL 3.2 (L)   Protein Total Latest Ref Range: 6.8 - 8.8 g/dL 7.0   Bilirubin Total Latest Ref Range: 0.2 - 1.3 mg/dL 1.8 (H)   Alkaline Phosphatase Latest Ref Range: 40 - 150 U/L 72   ALT Latest Ref Range: 0 - 50 U/L 22   AST Latest Ref Range: 0 - 45 U/L 29   Glucose Latest Ref Range: 70 - 99 mg/dL 141 (H)   WBC Latest Ref Range: 4.0 - 11.0 10e9/L 4.3   Hemoglobin Latest Ref Range: 11.7 - 15.7 g/dL 7.9 (L)   Hematocrit Latest Ref Range: 35.0 - 47.0 % 22.8 (L)   Platelet Count Latest Ref Range: 150 - 450 10e9/L 40 (LL)   RBC Count Latest Ref Range: 3.8 - 5.2 10e12/L 1.92 (L)   MCV Latest Ref Range: 78 - 100 fl 119 (H)   MCH Latest Ref Range: 26.5 - 33.0 pg 41.1 (H)   MCHC Latest Ref Range: 31.5 - 36.5 g/dL 34.6   RDW Latest Ref Range: 10.0 - 15.0 % 15.6 (H)   Diff Method Unknown Automated Method   % Neutrophils Latest Units: % 45.1   % Lymphocytes Latest Units: % 47.1   % Monocytes Latest Units: % 6.4   % Eosinophils Latest Units: % 1.4   % Basophils Latest Units: % 0.0   % Immature Granulocytes Latest Units: % 0.0   Nucleated RBCs Latest Ref Range: 0 /100 0   Absolute Neutrophil Latest Ref Range: 1.6 - 8.3 10e9/L 1.9   Absolute Lymphocytes Latest Ref Range: 0.8 - 5.3 10e9/L 2.0   Absolute Monocytes Latest Ref Range: 0.0 - 1.3 10e9/L 0.3   Absolute Eosinophils Latest Ref Range: 0.0 - 0.7 10e9/L 0.1   Absolute Basophils Latest Ref Range: 0.0 - 0.2 10e9/L 0.0   Abs Immature Granulocytes Latest Ref Range: 0 - 0.4 10e9/L 0.0   Absolute Nucleated RBC Unknown 0.0   Cyclosporine Last Dose Unknown NOT GIVEN   Cyclosporine Level Latest Ref Range: 50 - 400 ug/L 204            Rafita Rogel MD

## 2017-07-13 NOTE — PROGRESS NOTES
/82  Pulse 85  Temp 97.2  F (36.2  C) (Oral)  Resp 14  Wt 53.5 kg (117 lb 14.4 oz)  SpO2 98%  BMI 21.56 kg/m2  Wt Readings from Last 4 Encounters:   07/13/17 53.5 kg (117 lb 14.4 oz)   06/22/17 54.3 kg (119 lb 11.2 oz)   06/01/17 55.6 kg (122 lb 8 oz)   05/31/17 55.3 kg (121 lb 14.4 oz)     Yokasta returns to follow-up her aplastic anemia. She's now been taking cyclosporine and he'll tremble PEG for a number of months. She's had no need for transfusions in several months and her blood counts have stabilized with her hemoglobin approximately 8 her neutrophils [id number] and her platelets in the 30-40,000 range. She's had no excess external bleeding or bruising. She's had no notable recent infections but she did have up PE tube placed in her right ear for ear stuffiness earlier this week. Air was a bit of drainage apparently at the tube placement and maybe it's a bit better since. She still has a stuffy nose. Her appetite is not great though no particular foods bother her in particular. She does feel full easily and thus is eating less. She's had no particular abdominal pain nausea or heartburn. Previously when she was taking more pain medication she had some tendency to constipation but with no laxatives and all she now has loose stools 1-3 times daily several days per week. She cannot attribute it to any particular foods and it doesn't feel like an infection in that it doesn't wake her at night and is not explosive. She has no new respiratory cardiac or neurologic symptoms and the rest of her review of systems is unrevealing.    Her exam shows her pale but in no acute distress. She has a few scattered bruises but no petechiae. There is no focal bone tenderness at any site. She has no cervical supraclavicular axillary or inguinal lymphadenopathy. Her oropharynx shows no mucosal lesions. The tube is present in her right eardrum and there is no drainage or surrounding erythema. Her left external ear and the  tympanic membrane look fine. Her lungs are clear without rales or wheezes. Her heart tones are regular there is no gallop. Her abdomen is soft and nontender without hepatosplenomegaly or masses.    Laboratory testing showed basically stable but low blood counts over some time and as noted she's not needed transfusions in 2 months.    Her serum creatinine is up a bit as is her systolic blood pressure, but her cyclosporine levels acceptable at just over 200 and we'll make no changes.    At this point I think it is best to continue her cyclosporine and eltrombopag at the same doses and we can spread out her intervals of checking every to every 3 weeks. Because she is, at least for now, free of transfusion dependence, I also suggested she could have her PICC line removed so she wouldn't need the dressing change as frequently and wouldn't be at risk for the infection or clotting hazards that come with a PICC line. It could not be arranged here at the [facility] today as it was late in the afternoon but will have it removed soon. She is aware that if transfusion dependence recurs we will have to replace it and that she'll need venipuncture for checking her blood counts.    At this point I suggested she return every 3 weeks for a brief visit and lab checks and if her blood counts stabilize we may extend the interval subsequently to monthly. She'll still need monitoring of her blood pressure kidney function and cyclosporine levels.    She knows to call if additional issues arise.    Rafita Palencia. of medicine    Results for LISSETH PARIKH (MRN [id number]) as of 7/13/2017 20:56   Ref. Range 7/12/2017 09:23   Sodium Latest Ref Range: 133 - 144 mmol/L 137   Potassium Latest Ref Range: 3.4 - 5.3 mmol/L 5.9 (H)   Chloride Latest Ref Range: 94 - 109 mmol/L 108   Carbon Dioxide Latest Ref Range: 20 - 32 mmol/L 20   Urea Nitrogen Latest Ref Range: 7 - 30 mg/dL 31 (H)   Creatinine Latest Ref Range: 0.52 - 1.04 mg/dL  1.31 (H)   GFR Estimate Latest Ref Range: >60 mL/min/1.7m2 40 (L)   GFR Estimate If Black Latest Ref Range: >60 mL/min/1.7m2 48 (L)   Calcium Latest Ref Range: 8.5 - 10.1 mg/dL 8.6   Anion Gap Latest Ref Range: 3 - 14 mmol/L 9   Albumin Latest Ref Range: 3.4 - 5.0 g/dL 3.2 (L)   Protein Total Latest Ref Range: 6.8 - 8.8 g/dL 7.0   Bilirubin Total Latest Ref Range: 0.2 - 1.3 mg/dL 1.8 (H)   Alkaline Phosphatase Latest Ref Range: 40 - 150 U/L 72   ALT Latest Ref Range: 0 - 50 U/L 22   AST Latest Ref Range: 0 - 45 U/L 29   Glucose Latest Ref Range: 70 - 99 mg/dL 141 (H)   WBC Latest Ref Range: 4.0 - 11.0 10e9/L 4.3   Hemoglobin Latest Ref Range: 11.7 - 15.7 g/dL 7.9 (L)   Hematocrit Latest Ref Range: 35.0 - 47.0 % 22.8 (L)   Platelet Count Latest Ref Range: 150 - 450 10e9/L 40 (LL)   RBC Count Latest Ref Range: 3.8 - 5.2 10e12/L 1.92 (L)   MCV Latest Ref Range: 78 - 100 fl 119 (H)   MCH Latest Ref Range: 26.5 - 33.0 pg 41.1 (H)   MCHC Latest Ref Range: 31.5 - 36.5 g/dL 34.6   RDW Latest Ref Range: 10.0 - 15.0 % 15.6 (H)   Diff Method Unknown Automated Method   % Neutrophils Latest Units: % 45.1   % Lymphocytes Latest Units: % 47.1   % Monocytes Latest Units: % 6.4   % Eosinophils Latest Units: % 1.4   % Basophils Latest Units: % 0.0   % Immature Granulocytes Latest Units: % 0.0   Nucleated RBCs Latest Ref Range: 0 /100 0   Absolute Neutrophil Latest Ref Range: 1.6 - 8.3 10e9/L 1.9   Absolute Lymphocytes Latest Ref Range: 0.8 - 5.3 10e9/L 2.0   Absolute Monocytes Latest Ref Range: 0.0 - 1.3 10e9/L 0.3   Absolute Eosinophils Latest Ref Range: 0.0 - 0.7 10e9/L 0.1   Absolute Basophils Latest Ref Range: 0.0 - 0.2 10e9/L 0.0   Abs Immature Granulocytes Latest Ref Range: 0 - 0.4 10e9/L 0.0   Absolute Nucleated RBC Unknown 0.0   Cyclosporine Last Dose Unknown NOT GIVEN   Cyclosporine Level Latest Ref Range: 50 - 400 ug/L 204

## 2017-07-13 NOTE — MR AVS SNAPSHOT
After Visit Summary   7/13/2017    Bonny Raymundo    MRN: [id number]           Patient Information     Date Of Birth          1945        Visit Information        Provider Department      7/13/2017 4:00 PM Rafita Rogel MD [facility] Blood and Marrow Transplant        Today's Diagnoses     Aplastic anemia (H)    -  1          [facility] ([facility])  [address] [place] [address]  Phone: [phone]  Clinic Hours:   Monday-Thursday:7am to 7pm   Friday: 7am to 5pm   Weekends and holidays:    8am to noon (in general)  If your fever is 100.5  or greater,   call the clinic.  After hours call the   hospital at [phone] or   [phone]. Ask for the BMT   fellow on-call            Follow-ups after your visit        Additional Services     RAMIN Physical Therapy Referral                 Your next 10 appointments already scheduled     Jul 19, 2017  1:00 PM CDT   Level 1 with  INFUSION CHAIR 15   [facility] ([facility])    [facility]  [address]  [facility] [address]   [phone]            Jul 26, 2017  9:00 AM CDT   Level 5 with  INFUSION CHAIR 10   [facility] ([facility])    [facility]  [address]  [facility] [address]   [phone]              Who to contact     If you have questions or need follow up information about today's clinic visit or your schedule please contact [facility] directly at [phone].  Normal or non-critical lab and imaging results will be communicated to you by MyChart, letter or phone within 4 business days after the clinic has received the results. If you do not hear from us within 7 days, please contact the clinic through MyChart or phone. If you have a critical or abnormal lab result, we will notify you by phone as soon as  "possible.  Submit refill requests through [employer] or call your pharmacy and they will forward the refill request to us. Please allow 3 business days for your refill to be completed.          Additional Information About Your Visit        [employer] Information     [employer] lets you send messages to your doctor, view your test results, renew your prescriptions, schedule appointments and more. To sign up, go to [email or web address] . Click on \"Log in\" on the left side of the screen, which will take you to the Welcome page. Then click on \"Sign up Now\" on the right side of the page.     You will be asked to enter the access code listed below, as well as some personal information. Please follow the directions to create your username and password.     Your access code is: [email or web address]  Expires: 2017  9:26 AM     Your access code will  in 90 days. If you need help or a new code, please call your [facility] clinic or [phone].        Care EveryWhere ID     This is your Care EveryWhere ID. This could be used by other organizations to access your [facility] medical records  [id number]        Your Vitals Were     Pulse Temperature Respirations Pulse Oximetry BMI (Body Mass Index)       85 97.2  F (36.2  C) (Oral) 14 98% 21.56 kg/m2        Blood Pressure from Last 3 Encounters:   17 145/82   17 100/62   17 (!) 138/91    Weight from Last 3 Encounters:   17 53.5 kg (117 lb 14.4 oz)   17 54.3 kg (119 lb 11.2 oz)   17 55.6 kg (122 lb 8 oz)              We Performed the Following     CBC with platelets differential     Comprehensive metabolic panel     Cyclosporine     RAMIN Physical Therapy Referral     Reticulocyte count          Today's Medication Changes          These changes are accurate as of: 17  8:59 PM.  If you have any questions, ask your nurse or doctor.               Stop taking these medicines if you haven't already. Please contact your care team if you have questions. "     carbamide peroxide 6.5 % otic solution   Commonly known as:  DEBROX   Stopped by:  Rafita Rogel MD           ondansetron 4 MG ODT tab   Commonly known as:  ZOFRAN-ODT   Stopped by:  Rafita Rogel MD           sucralfate 1 GM/10ML suspension   Commonly known as:  CARAFATE   Stopped by:  Rafita Rogel MD                    Recent Review Flowsheet Data     BMT Recent Results Latest Ref Rng & Units 5/24/2017 5/24/2017 5/31/2017 6/14/2017 6/14/2017 6/28/2017 7/12/2017    WBC 4.0 - 11.0 10e9/L 2.8(L) - 3.9(L) 3.5(L) - 3.6(L) 4.3    Hemoglobin 11.7 - 15.7 g/dL 8.1(L) - 8.0(L) 7.9(L) - 7.7(L) 7.9(L)    Platelet Count 150 - 450 10e9/L 33(LL) - 36(LL) 38(LL) - 39(LL) 40(LL)    Neutrophils (Absolute) 1.6 - 8.3 10e9/L 1.0(L) - 2.1 1.5(L) - 1.4(L) 1.9    Sodium 133 - 144 mmol/L 138 - 142 137 - 139 137    Potassium 3.4 - 5.3 mmol/L 6.2(HH) 3.8 4.4 6.5(HH) 4.1 5.5(H) 5.9(H)    Chloride 94 - 109 mmol/L 109 - 110(H) 109 - 108 108    Glucose 70 - 99 mg/dL 125(H) - 118(H) 105(H) - 160(H) 141(H)    Urea Nitrogen 7 - 30 mg/dL 29 - 31(H) 37(H) - 32(H) 31(H)    Creatinine 0.52 - 1.04 mg/dL 1.09(H) - 1.23(H) 1.18(H) - 1.10(H) 1.31(H)    Calcium (Total) 8.5 - 10.1 mg/dL 8.4(L) - 8.6 8.1(L) - 8.5 8.6    Protein (Total) 6.8 - 8.8 g/dL 6.9 - 7.0 7.0 - 7.0 7.0    Albumin 3.4 - 5.0 g/dL 3.2(L) - 3.2(L) 3.1(L) - 3.2(L) 3.2(L)    Alkaline Phosphatase 40 - 150 U/L 86 - 89 87 - 79 72    AST 0 - 45 U/L 34 - 15 32 - 31 29    ALT 0 - 50 U/L 23 - 21 24 - 24 22    MCV 78 - 100 fl 115(H) - 115(H) 118(H) - 121(H) 119(H)               Primary Care Provider Office Phone # Fax #    Shivani Phelps PA-C [phone]       [facility] [address]  [place] [address]        Equal Access to Services     LANDON NOLASCO : Misty Hines, wacharlotte lynn, qaybta kaalambika jimenes. [facility] [phone].    ATENCIÓN: Si lo jin  a robertson disposición servicios gratuitos de asistencia lingüística. Cayden mejia [phone].    We comply with applicable federal civil rights laws and [place] laws. We do not discriminate on the basis of race, color, national origin, age, disability sex, sexual orientation or gender identity.            Thank you!     Thank you for choosing [facility]  for your care. Our goal is always to provide you with excellent care. Hearing back from our patients is one way we can continue to improve our services. Please take a few minutes to complete the written survey that you may receive in the mail after your visit with us. Thank you!             Your Updated Medication List - Protect others around you: Learn how to safely use, store and throw away your medicines at www.disposemymeds.org.          This list is accurate as of: 7/13/17  8:59 PM.  Always use your most recent med list.                   Brand Name Dispense Instructions for use Diagnosis    BENADRYL ALLERGY 25 MG tablet   Generic drug:  diphenhydrAMINE     56 tablet    Take 1 tablet (25 mg) by mouth nightly as needed        chlorpheniramine 4 MG tablet    CHLOR-TRIMETON     Take 4 mg by mouth every 6 hours as needed. For head cold        COMPRESSION STOCKINGS     2 each    Wear compression stockings at 20-30 mmHg rating most time during the day to the affected leg (left leg) or both legs. Take them off at night.    DVT (deep venous thrombosis), left, Postphlebitic syndrome       * cycloSPORINE modified 100 MG capsule    GENERIC EQUIVALENT    120 capsule    Take 1 capsule (100 mg) by mouth 2 times daily Reported on 5/4/2017    Gastroesophageal reflux disease without esophagitis, Left-sided low back pain without sciatica, unspecified chronicity, Constipation, unspecified constipation type, Hemorrhoids, unspecified hemorrhoid type       * cycloSPORINE modified 25 MG capsule    GENERIC EQUIVALENT    540 capsule    Take 1 capsule (25 mg) by  mouth 2 times daily Or as directed    Aplastic anemia (H)       diclofenac 1 % Gel topical gel    VOLTAREN    100 g    Apply 2 g topically 2 times daily    Myofascial pain       eltrombopag 50 MG tablet    PROMACTA    90 tablet    Take 3 tablets (150 mg) by mouth daily Administer on an empty stomach, 1 hour before or 2 hours after a meal. Or as directed    Idiopathic aplastic anemia (H), Pancytopenia (H)       fluticasone 50 MCG/ACT spray    FLONASE    1 Bottle    Spray 1-2 sprays into both nostrils daily    Rhinitis medicamentosa, Bilateral impacted cerumen, Nasal congestion       loratadine 10 MG tablet    CLARITIN     Take 10 mg by mouth daily as needed Reported on 4/26/2017        order for DME     1 Box    Equipment being ordered: knee high compression stockings- 18-20 mm    DVT prophylaxis       oxyCODONE 5 MG IR tablet    ROXICODONE    50 tablet    Take 1 tablet (5 mg) by mouth every 6 hours as needed for moderate to severe pain    Left-sided low back pain without sciatica, unspecified chronicity       pantoprazole 40 MG EC tablet    PROTONIX    60 tablet    Take 1 tablet (40 mg) by mouth 2 times daily    Gastroesophageal reflux disease without esophagitis, Left-sided low back pain without sciatica, unspecified chronicity, Constipation, unspecified constipation type, Hemorrhoids, unspecified hemorrhoid type       polyethylene glycol Packet    MIRALAX/GLYCOLAX    14 packet    Take 17 g by mouth daily as needed for constipation . If you start to have loose stools/diarrhea or multiple bowel movements, ok to hold this medication. Then resume if no bowel movement after 24-48hours.    Hemorrhoids, unspecified hemorrhoid type, Gastroesophageal reflux disease without esophagitis, Left-sided low back pain without sciatica, unspecified chronicity, Constipation, unspecified constipation type       pseudoePHEDrine 120 MG 12 hr tablet    SUDAFED     Take 120 mg by mouth every 12 hours        SENNA S 8.6-50 MG per tablet    Generic drug:  senna-docusate     100 tablet    Can take 1-2 tablets every other day    Left-sided low back pain without sciatica, unspecified chronicity, Constipation, unspecified constipation type, Gastroesophageal reflux disease without esophagitis, Hemorrhoids, unspecified hemorrhoid type       TYLENOL PO      Take 325 mg by mouth every 6 hours as needed for mild pain or fever        * Notice:  This list has 2 medication(s) that are the same as other medications prescribed for you. Read the directions carefully, and ask your doctor or other care provider to review them with you.

## 2017-07-14 ENCOUNTER — INFUSION THERAPY VISIT (OUTPATIENT)
Dept: INFUSION THERAPY | Facility: CLINIC | Age: 72
End: 2017-07-14
Attending: PHYSICIAN ASSISTANT
Payer: COMMERCIAL

## 2017-07-14 DIAGNOSIS — Z45.2 PICC (PERIPHERALLY INSERTED CENTRAL CATHETER) IN PLACE: Primary | ICD-10-CM

## 2017-07-14 PROCEDURE — 40000269 ZZH STATISTIC NO CHARGE FACILITY FEE

## 2017-07-14 NOTE — PROGRESS NOTES
Infusion Nursing Note:  Bonny Raymundo presents today for PICC removal.    Patient seen by provider today: No   present during visit today: Not Applicable.    Note: No concerns to report..    Intravenous Access:  PICC.    Treatment Conditions:  Not Applicable.      Post Infusion Assessment:  PICC removed intact with bacitracin and sterile 2x2 gauze and tegaderm for 2 days with coban wrap over that for one hour.    Discharge Plan:   Discharge instructions reviewed with: Patient.  Patient and/or family verbalized understanding of discharge instructions and all questions answered.  Copy of AVS reviewed with patient and/or family.  Patient will return 7/26 for next appointment.  Patient discharged in stable condition accompanied by: self.  Departure Mode: Ambulatory.    Leidy Tavera RN

## 2017-07-14 NOTE — MR AVS SNAPSHOT
"              After Visit Summary   7/14/2017    Bonny Raymundo    MRN: [id number]           Patient Information     Date Of Birth          1945        Visit Information        Provider Department      7/14/2017 3:00 PM  INFUSION CHAIR 3 [facility]        Today's Diagnoses     PICC (peripherally inserted central catheter) in place    -  1       Follow-ups after your visit        Your next 10 appointments already scheduled     Jul 26, 2017  9:00 AM CDT   Level 5 with  INFUSION CHAIR 10   [facility] ([facility])    [facility]  [address]  [place] [address] [phone]              Who to contact     If you have questions or need follow up information about today's clinic visit or your schedule please contact [facility] directly at [phone].  Normal or non-critical lab and imaging results will be communicated to you by Depophart, letter or phone within 4 business days after the clinic has received the results. If you do not hear from us within 7 days, please contact the clinic through Depophart or phone. If you have a critical or abnormal lab result, we will notify you by phone as soon as possible.  Submit refill requests through WalletKit or call your pharmacy and they will forward the refill request to us. Please allow 3 business days for your refill to be completed.          Additional Information About Your Visit        MyChart Information     WalletKit lets you send messages to your doctor, view your test results, renew your prescriptions, schedule appointments and more. To sign up, go to [email or web address] . Click on \"Log in\" on the left side of the screen, which will take you to the Welcome page. Then click on \"Sign up Now\" on the right side of the page.     You will be asked to enter the access code listed below, as well as some personal " information. Please follow the directions to create your username and password.     Your access code is: [email or web address]  Expires: 2017  9:26 AM     Your access code will  in 90 days. If you need help or a new code, please call your [facility] clinic or [phone].        Care EveryWhere ID     This is your Care EveryWhere ID. This could be used by other organizations to access your [facility] medical records  [id number]         Blood Pressure from Last 3 Encounters:   17 145/82   17 100/62   17 (!) 138/91    Weight from Last 3 Encounters:   17 53.5 kg (117 lb 14.4 oz)   17 54.3 kg (119 lb 11.2 oz)   17 55.6 kg (122 lb 8 oz)              Today, you had the following     No orders found for display       Primary Care Provider Office Phone # Fax #    Shivani Phelps PA-C [phone]       [facility] [place] [address]  [place] [address]        Equal Access to Services     [facility] : Hadii aad ku hadasho Soomaali, waaxda luqadaha, qaybta kaalmada adeegyada, ambika ng hayiliana strong . So [facility] [phone].    ATENCIÓN: Si habla español, tiene a robertson disposición servicios gratuitos de asistencia lingüística. Llame al [phone].    We comply with applicable federal civil rights laws and [place] laws. We do not discriminate on the basis of race, color, national origin, age, disability sex, sexual orientation or gender identity.            Thank you!     Thank you for choosing [facility]  for your care. Our goal is always to provide you with excellent care. Hearing back from our patients is one way we can continue to improve our services. Please take a few minutes to complete the written survey that you may receive in the mail after your visit with us. Thank you!             Your Updated Medication List - Protect others around you: Learn how to safely use, store and throw away your medicines at  www.disposemymeds.org.          This list is accurate as of: 7/14/17  3:37 PM.  Always use your most recent med list.                   Brand Name Dispense Instructions for use Diagnosis    BENADRYL ALLERGY 25 MG tablet   Generic drug:  diphenhydrAMINE     56 tablet    Take 1 tablet (25 mg) by mouth nightly as needed        chlorpheniramine 4 MG tablet    CHLOR-TRIMETON     Take 4 mg by mouth every 6 hours as needed. For head cold        COMPRESSION STOCKINGS     2 each    Wear compression stockings at 20-30 mmHg rating most time during the day to the affected leg (left leg) or both legs. Take them off at night.    DVT (deep venous thrombosis), left, Postphlebitic syndrome       * cycloSPORINE modified 100 MG capsule    GENERIC EQUIVALENT    120 capsule    Take 1 capsule (100 mg) by mouth 2 times daily Reported on 5/4/2017    Gastroesophageal reflux disease without esophagitis, Left-sided low back pain without sciatica, unspecified chronicity, Constipation, unspecified constipation type, Hemorrhoids, unspecified hemorrhoid type       * cycloSPORINE modified 25 MG capsule    GENERIC EQUIVALENT    540 capsule    Take 1 capsule (25 mg) by mouth 2 times daily Or as directed    Aplastic anemia (H)       diclofenac 1 % Gel topical gel    VOLTAREN    100 g    Apply 2 g topically 2 times daily    Myofascial pain       eltrombopag 50 MG tablet    PROMACTA    90 tablet    Take 3 tablets (150 mg) by mouth daily Administer on an empty stomach, 1 hour before or 2 hours after a meal. Or as directed    Idiopathic aplastic anemia (H), Pancytopenia (H)       fluticasone 50 MCG/ACT spray    FLONASE    1 Bottle    Spray 1-2 sprays into both nostrils daily    Rhinitis medicamentosa, Bilateral impacted cerumen, Nasal congestion       loratadine 10 MG tablet    CLARITIN     Take 10 mg by mouth daily as needed Reported on 4/26/2017        order for DME     1 Box    Equipment being ordered: knee high compression stockings- 18-20 mm    DVT  prophylaxis       oxyCODONE 5 MG IR tablet    ROXICODONE    50 tablet    Take 1 tablet (5 mg) by mouth every 6 hours as needed for moderate to severe pain    Left-sided low back pain without sciatica, unspecified chronicity       pantoprazole 40 MG EC tablet    PROTONIX    60 tablet    Take 1 tablet (40 mg) by mouth 2 times daily    Gastroesophageal reflux disease without esophagitis, Left-sided low back pain without sciatica, unspecified chronicity, Constipation, unspecified constipation type, Hemorrhoids, unspecified hemorrhoid type       polyethylene glycol Packet    MIRALAX/GLYCOLAX    14 packet    Take 17 g by mouth daily as needed for constipation . If you start to have loose stools/diarrhea or multiple bowel movements, ok to hold this medication. Then resume if no bowel movement after 24-48hours.    Hemorrhoids, unspecified hemorrhoid type, Gastroesophageal reflux disease without esophagitis, Left-sided low back pain without sciatica, unspecified chronicity, Constipation, unspecified constipation type       pseudoePHEDrine 120 MG 12 hr tablet    SUDAFED     Take 120 mg by mouth every 12 hours        SENNA S 8.6-50 MG per tablet   Generic drug:  senna-docusate     100 tablet    Can take 1-2 tablets every other day    Left-sided low back pain without sciatica, unspecified chronicity, Constipation, unspecified constipation type, Gastroesophageal reflux disease without esophagitis, Hemorrhoids, unspecified hemorrhoid type       TYLENOL PO      Take 325 mg by mouth every 6 hours as needed for mild pain or fever        * Notice:  This list has 2 medication(s) that are the same as other medications prescribed for you. Read the directions carefully, and ask your doctor or other care provider to review them with you.

## 2017-07-20 DIAGNOSIS — M54.50 LEFT-SIDED LOW BACK PAIN WITHOUT SCIATICA, UNSPECIFIED CHRONICITY: ICD-10-CM

## 2017-07-20 DIAGNOSIS — K21.9 GASTROESOPHAGEAL REFLUX DISEASE WITHOUT ESOPHAGITIS: ICD-10-CM

## 2017-07-20 DIAGNOSIS — K64.9 HEMORRHOIDS, UNSPECIFIED HEMORRHOID TYPE: ICD-10-CM

## 2017-07-20 DIAGNOSIS — K59.00 CONSTIPATION, UNSPECIFIED CONSTIPATION TYPE: ICD-10-CM

## 2017-07-20 NOTE — TELEPHONE ENCOUNTER
CSA dose increased to 100 mg BID on 4/27/17 by Sejal RAYA. No change to dose per OV 7/13/17.    Next office visit: 8/2/17 with Sejal RAYA

## 2017-07-21 RX ORDER — CYCLOSPORINE 100 MG/1
100 CAPSULE, LIQUID FILLED ORAL 2 TIMES DAILY
Qty: 60 CAPSULE | Refills: 3 | Status: SHIPPED | OUTPATIENT
Start: 2017-07-21 | End: 2017-10-06

## 2017-08-01 ENCOUNTER — HOSPITAL ENCOUNTER (OUTPATIENT)
Dept: PHYSICAL THERAPY | Facility: CLINIC | Age: 72
Setting detail: THERAPIES SERIES
End: 2017-08-01
Attending: INTERNAL MEDICINE
Payer: COMMERCIAL

## 2017-08-01 PROCEDURE — 97162 PT EVAL MOD COMPLEX 30 MIN: CPT | Mod: GP | Performed by: PHYSICAL THERAPIST

## 2017-08-01 PROCEDURE — G8978 MOBILITY CURRENT STATUS: HCPCS | Mod: GP,CK | Performed by: PHYSICAL THERAPIST

## 2017-08-01 PROCEDURE — G8979 MOBILITY GOAL STATUS: HCPCS | Mod: GP,CJ | Performed by: PHYSICAL THERAPIST

## 2017-08-01 PROCEDURE — 40000719 ZZHC STATISTIC PT DEPARTMENT NEURO VISIT: Performed by: PHYSICAL THERAPIST

## 2017-08-01 ASSESSMENT — 6 MINUTE WALK TEST (6MWT): TOTAL DISTANCE WALKED (FT): 700

## 2017-08-02 ENCOUNTER — ONCOLOGY VISIT (OUTPATIENT)
Dept: ONCOLOGY | Facility: CLINIC | Age: 72
End: 2017-08-02
Attending: INTERNAL MEDICINE
Payer: COMMERCIAL

## 2017-08-02 ENCOUNTER — APPOINTMENT (OUTPATIENT)
Dept: LAB | Facility: CLINIC | Age: 72
End: 2017-08-02
Attending: INTERNAL MEDICINE
Payer: COMMERCIAL

## 2017-08-02 VITALS
HEART RATE: 86 BPM | SYSTOLIC BLOOD PRESSURE: 132 MMHG | TEMPERATURE: 98 F | OXYGEN SATURATION: 100 % | RESPIRATION RATE: 16 BRPM | WEIGHT: 117.5 LBS | BODY MASS INDEX: 21.49 KG/M2 | DIASTOLIC BLOOD PRESSURE: 85 MMHG

## 2017-08-02 DIAGNOSIS — M54.50 LEFT-SIDED LOW BACK PAIN WITHOUT SCIATICA, UNSPECIFIED CHRONICITY: ICD-10-CM

## 2017-08-02 DIAGNOSIS — D61.3 IDIOPATHIC APLASTIC ANEMIA (H): ICD-10-CM

## 2017-08-02 DIAGNOSIS — R63.4 LOSS OF WEIGHT: Primary | ICD-10-CM

## 2017-08-02 LAB
CYCLOSPORINE BLD LC/MS/MS-MCNC: 202 UG/L (ref 50–400)
TME LAST DOSE: NORMAL H

## 2017-08-02 PROCEDURE — 99212 OFFICE O/P EST SF 10 MIN: CPT | Mod: ZF

## 2017-08-02 PROCEDURE — 80158 DRUG ASSAY CYCLOSPORINE: CPT | Performed by: PHYSICIAN ASSISTANT

## 2017-08-02 PROCEDURE — 99214 OFFICE O/P EST MOD 30 MIN: CPT | Mod: ZP | Performed by: PHYSICIAN ASSISTANT

## 2017-08-02 RX ORDER — ONDANSETRON 8 MG/1
8 TABLET, ORALLY DISINTEGRATING ORAL
COMMUNITY
Start: 2014-05-20 | End: 2017-08-02

## 2017-08-02 RX ORDER — OXYCODONE HYDROCHLORIDE 5 MG/1
5 TABLET ORAL EVERY 6 HOURS PRN
Qty: 50 TABLET | Refills: 0 | Status: SHIPPED | OUTPATIENT
Start: 2017-08-02 | End: 2018-01-10

## 2017-08-02 NOTE — NURSING NOTE
"Oncology Rooming Note    August 2, 2017 9:41 AM   Bonny Raymundo is a 71 year old female who presents for:    Chief Complaint   Patient presents with     Blood Draw     labs drawn     Oncology Clinic Visit     aplastic anemia      Initial Vitals: /85  Pulse 86  Temp 98  F (36.7  C) (Oral)  Resp 16  Wt 53.3 kg (117 lb 8 oz)  SpO2 100%  BMI 21.49 kg/m2 Estimated body mass index is 21.49 kg/(m^2) as calculated from the following:    Height as of 6/22/17: 1.575 m (5' 2\").    Weight as of this encounter: 53.3 kg (117 lb 8 oz). Body surface area is 1.53 meters squared.  Data Unavailable Comment: Data Unavailable   No LMP recorded. Patient has had a hysterectomy.  Allergies reviewed: Yes  Medications reviewed: Yes    Medications: Medication refills not needed today.  Pharmacy name entered into EPIC:    [facility] [place] - [address]  [facility], [place] - [address]  [facility] [place], [place] - [id number] [place] [address]. [address]  WRITTEN PRESCRIPTION REQUESTED    Clinical concerns: no doc was NOT notified.    5 minutes for nursing intake (face to face time)     Hanane Giang CMA              "

## 2017-08-02 NOTE — NURSING NOTE
Chief Complaint   Patient presents with     Blood Draw     labs drawn     Labs drawn peripherally. Patient tolerated well.     EFFIE Redding RN, BSN

## 2017-08-02 NOTE — MR AVS SNAPSHOT
After Visit Summary   8/2/2017    Bonny Raymundo    MRN: [id number]           Patient Information     Date Of Birth          1945        Visit Information        Provider Department      8/2/2017 9:30 AM Celine Walls PA-C [facility]        Today's Diagnoses     Loss of weight    -  1    Idiopathic aplastic anemia (H)        Left-sided low back pain without sciatica, unspecified chronicity           Follow-ups after your visit        Additional Services     NUTRITION REFERRAL       Your provider has referred you to: [facility] (on call location) - [place] [phone]   [email or web address]    Please be aware that coverage of these services is subject to the terms and limitations of your health insurance plan.  Call member services at your health plan with any benefit or coverage questions.      Please bring the following with you to your appointment:    (1) This referral request  (2) Any documents given to you regarding this referral  (3) Any specific questions you have about diet and/or food choices                  Your next 10 appointments already scheduled     Aug 17, 2017  3:30 PM CDT   RETURN ONC with Rafita Rogel MD   [facility] ([facility])    [address]  2nd [address]  [facility] [address] [phone]            Aug 18, 2017 10:45 AM CDT   Neuro Treatment with Natalie Mckinnon, PT   [facility] ([facility])    [address]  [address]  [place] [address]   [phone]            Aug 23, 2017  9:15 AM CDT   Return Visit with  Oncology Nurse   [facility] ([facility])    [facility] [place]  [address]  [place] [address]   [phone]            Aug 25, 2017 10:30 AM CDT   Neuro Treatment with Natalie Mckinnon,  PT   [facility] ([facility])    [address]  [address]  [place] [address]   [phone]            Sep 01, 2017 10:00 AM CDT   Neuro Treatment with Natalie Dunne Jasmine Mckinnon, PT   [facility] ([facility])    [address]  [address]  [place] [address]   [phone]            Sep 08, 2017 10:00 AM CDT   Neuro Treatment with Natalie Dunne Jasmine Mckinnon, PT   [facility] ([facility])    [address] [place] [address]  [address]  [place] [address]   [phone]            Sep 13, 2017  8:15 AM CDT   [facility] Draw with  [facility] DRAW   [facility] ([facility])    [address]  [address]nd [address]  [facility] [address]   [phone]            Sep 13, 2017  8:40 AM CDT   (Arrive by 8:25 AM)   Return Visit with Celine Walls PA-C   [facility]    [address]  [address]nd [address]  [facility] [address]   [phone]            Sep 22, 2017  9:45 AM CDT   Neuro Treatment with Natalie Dunne Jasmine Mckinnon, PT   [facility] ([facility])    [address] [place] [address]  [address]  [place] [address]   [phone]            Oct 26, 2017  4:30 PM CDT   RETURN ONC with Rafita Rogel MD   [facility] ([facility])    [address]  [address]  [facility] [address]   [phone]              Future tests that were ordered for you today     Open Standing Orders        Priority Remaining Interval Expires Ordered    Red blood cell prepare order unit Routine 99/100 CONDITIONAL (SPECIFY) BLOOD  8/1/2017    Platelets prepare order unit Routine 99/100 CONDITIONAL (SPECIFY) BLOOD  8/1/2017            Who to contact     If you have questions or need follow up information about today's clinic visit or your  "schedule please contact [facility] directly at [phone].  Normal or non-critical lab and imaging results will be communicated to you by MyChart, letter or phone within 4 business days after the clinic has received the results. If you do not hear from us within 7 days, please contact the clinic through MyChart or phone. If you have a critical or abnormal lab result, we will notify you by phone as soon as possible.  Submit refill requests through CrowdWorks or call your pharmacy and they will forward the refill request to us. Please allow 3 business days for your refill to be completed.          Additional Information About Your Visit        [employer] Information     CrowdWorks lets you send messages to your doctor, view your test results, renew your prescriptions, schedule appointments and more. To sign up, go to [email or web address] . Click on \"Log in\" on the left side of the screen, which will take you to the Welcome page. Then click on \"Sign up Now\" on the right side of the page.     You will be asked to enter the access code listed below, as well as some personal information. Please follow the directions to create your username and password.     Your access code is: [email or web address]  Expires: 2017  9:26 AM     Your access code will  in 90 days. If you need help or a new code, please call your [facility] clinic or [phone].        Care EveryWhere ID     This is your Care EveryWhere ID. This could be used by other organizations to access your [facility] medical records  [id number]        Your Vitals Were     Pulse Temperature Respirations Pulse Oximetry BMI (Body Mass Index)       86 98  F (36.7  C) (Oral) 16 100% 21.49 kg/m2        Blood Pressure from Last 3 Encounters:   17 132/85   17 145/82   17 100/62    Weight from Last 3 Encounters:   17 53.3 kg (117 lb 8 oz)   17 53.5 kg (117 lb 14.4 oz)   17 54.3 kg (119 lb 11.2 oz)              We Performed " the Following     Cyclosporine     NUTRITION REFERRAL          Where to get your medicines      Some of these will need a paper prescription and others can be bought over the counter.  Ask your nurse if you have questions.     Bring a paper prescription for each of these medications     oxyCODONE 5 MG IR tablet          Primary Care Provider Office Phone # Fax #    Shivani Phelps PA-C [phone]       [facility] [address]  [facility] [address]        Equal Access to Services     CHARLES NOLASCO : Hadii aad ku hadasho Soomaali, waaxda luqadaha, qaybta kaalmada adeegyada, waxay idiin hayaan adeeg kharash larose . So [facility] [phone].    ATENCIÓN: Si habla español, tiene a robertson disposición servicios gratuitos de asistencia lingüística. [facility] [phone].    We comply with applicable federal civil rights laws and [place] laws. We do not discriminate on the basis of race, color, national origin, age, disability sex, sexual orientation or gender identity.            Thank you!     Thank you for choosing [facility]  for your care. Our goal is always to provide you with excellent care. Hearing back from our patients is one way we can continue to improve our services. Please take a few minutes to complete the written survey that you may receive in the mail after your visit with us. Thank you!             Your Updated Medication List - Protect others around you: Learn how to safely use, store and throw away your medicines at www.disposemymeds.org.          This list is accurate as of: 8/2/17 10:45 AM.  Always use your most recent med list.                   Brand Name Dispense Instructions for use Diagnosis    BENADRYL ALLERGY 25 MG tablet   Generic drug:  diphenhydrAMINE     56 tablet    Take 1 tablet (25 mg) by mouth nightly as needed        chlorpheniramine 4 MG tablet    CHLOR-TRIMETON     Take 4 mg by mouth every 6 hours as needed. For head cold         COMPRESSION STOCKINGS     2 each    Wear compression stockings at 20-30 mmHg rating most time during the day to the affected leg (left leg) or both legs. Take them off at night.    DVT (deep venous thrombosis), left, Postphlebitic syndrome       * cycloSPORINE modified 25 MG capsule    GENERIC EQUIVALENT    540 capsule    Take 1 capsule (25 mg) by mouth 2 times daily Or as directed    Aplastic anemia (H)       * cycloSPORINE modified 100 MG capsule    GENERIC EQUIVALENT    60 capsule    Take 1 capsule (100 mg) by mouth 2 times daily Reported on 5/4/2017    Gastroesophageal reflux disease without esophagitis, Left-sided low back pain without sciatica, unspecified chronicity, Constipation, unspecified constipation type, Hemorrhoids, unspecified hemorrhoid type       diclofenac 1 % Gel topical gel    VOLTAREN    100 g    Apply 2 g topically 2 times daily    Myofascial pain       eltrombopag 50 MG tablet    PROMACTA    90 tablet    Take 3 tablets (150 mg) by mouth daily Administer on an empty stomach, 1 hour before or 2 hours after a meal. Or as directed    Idiopathic aplastic anemia (H), Pancytopenia (H)       fluticasone 50 MCG/ACT spray    FLONASE    1 Bottle    Spray 1-2 sprays into both nostrils daily    Rhinitis medicamentosa, Bilateral impacted cerumen, Nasal congestion       loratadine 10 MG tablet    CLARITIN     Take 10 mg by mouth daily as needed Reported on 4/26/2017        order for DME     1 Box    Equipment being ordered: knee high compression stockings- 18-20 mm    DVT prophylaxis       oxyCODONE 5 MG IR tablet    ROXICODONE    50 tablet    Take 1 tablet (5 mg) by mouth every 6 hours as needed for moderate to severe pain    Left-sided low back pain without sciatica, unspecified chronicity       pantoprazole 40 MG EC tablet    PROTONIX    60 tablet    Take 1 tablet (40 mg) by mouth 2 times daily    Gastroesophageal reflux disease without esophagitis, Left-sided low back pain without sciatica, unspecified  chronicity, Constipation, unspecified constipation type, Hemorrhoids, unspecified hemorrhoid type       TYLENOL PO      Take 325 mg by mouth every 6 hours as needed for mild pain or fever        * Notice:  This list has 2 medication(s) that are the same as other medications prescribed for you. Read the directions carefully, and ask your doctor or other care provider to review them with you.

## 2017-08-03 NOTE — PROGRESS NOTES
[facility] CANCER CLINIC  FOLLOW-UP VISIT NOTE  Date of visit:   8/2/17        REASON FOR VISIT: Aplastic anemia, routine 3 week follow up    HPI: Bonny is a 71 year old female who presented in the fall of 2016 with fatigue and shortness of breath. 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  Her counts showed pancytopenia and BMBX was concerning for aplastic anemia.  By December of 2016 she was transfusion dependent with platelets under 10 k and ANC dropped under 500. 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  She has met with Dr Mcdaniel at [facility] and consulted with Dr Rogel at [facility].      After further consultation it was decided to admit on 1/9/17 for ATG/cyclosporine/prednisone therapy.     The following was her inpatient regimen:  Day 1= 1/9/17  ATG 2500 mg (40 mg/kg0 q24 hours D-4)  Cyclosporine 200 mg (3 mg/kg) PO bid  Eltrombopag 50 mg daily  Methylpred 31 mg (0.5 mg/kg) q24 hours D1-4  Prednisone 60 mg (1 mg/kg) daily after completion of IV methylpred to continue for at least 2 weeks    Bonny also had a admission 1/23-1/25/17 for rectal bleeding (presumed hemorrhoidal). Otherwise has done well with no other complications requiring hospitalization.     INTERVAL HISTORY: Bonny is here today for her q3w follow up. Bonny continues to deal with fatigue, unsteadiness, difficulty swallowing, OCASIO and back pain. However, they all seem to be mildly better.  She was impressed she went to [employer] the other day and instead of stopping multiple times to rest, she only stopped twice.  She tries to walk the dogs, but doesn't go very far.  She feels her dysphagia is better and that she is eating more, but is concerned that her weight is going down.  She just started PT weekly and is hoping this will help with her stamina and coordination and low back pain.       No fevers, chest pain and and no bleeding. Daily BM, no black/blood. No   issues. She has a tremor that is mild.  She just had bilateral myringotomy tubes placed and her hearing is better.     ROS: complete review of systems was performed which was negative unless noted above.    EXAM:  /85  Pulse 86  Temp 98  F (36.7  C) (Oral)  Resp 16  Wt 53.3 kg (117 lb 8 oz)  SpO2 100%  BMI 21.49 kg/m2  Wt Readings from Last 4 Encounters:   08/02/17 53.3 kg (117 lb 8 oz)   07/13/17 53.5 kg (117 lb 14.4 oz)   06/22/17 54.3 kg (119 lb 11.2 oz)   06/01/17 55.6 kg (122 lb 8 oz)     General: Patient alert and oriented x3.  She looks better/stronger better skin coloring than I have seen her in a while.   EYES: PERRLA, conjunctiva pink, sclera is jaundice.   Neck: No palpable cervical, supraclavicular or axillary nodes bilaterally.   Cardiovascular: RRR, no murmurs, gallops or rubs  Respiratory: clear to auscultation bilaterally;  no crackles, rhonchi or wheezing.   Abdomen: positive bowel sounds in all four quadrants, soft, nontender  Skin: light jaundice, intact  Neuro: grossly intact.   Mood and affect is stable.       LABS:    5/31/2017 08:40 6/14/2017 09:19 6/28/2017 09:10 7/12/2017 09:23 8/2/2017 09:32   WBC 3.9 (L) 3.5 (L) 3.6 (L) 4.3 3.8 (L)   Hemoglobin 8.0 (L) 7.9 (L) 7.7 (L) 7.9 (L) 7.8 (L)   Hematocrit 23.2 (L) 23.2 (L) 23.1 (L) 22.8 (L) 24.1 (L)   Platelet Count 36 (LL) 38 (LL) 39 (LL) 40 (LL) 38 (LL)   RBC Count 2.02 (L) 1.97 (L) 1.91 (L) 1.92 (L) 1.95 (L)    (H) 118 (H) 121 (H) 119 (H) 124 (H)   MCH 39.6 (H) 40.1 (H) 40.3 (H) 41.1 (H) 40.0 (H)   MCHC 34.5 34.1 33.3 34.6 32.4   Absolute Neutrophil 2.1 1.5 (L) 1.4 (L) 1.9 1.8      6/28/2017 09:10 7/12/2017 09:23 8/2/2017 09:32   Sodium 139 137 139   Potassium 5.5 (H) 5.9 (H) 4.2   Chloride 108 108 107   Carbon Dioxide 21 20 23   Urea Nitrogen 32 (H) 31 (H) 40 (H)   Creatinine 1.10 (H) 1.31 (H) 1.36 (H)   GFR Estimate 49 (L) 40 (L) 38 (L)   GFR Estimate If Black 59 (L) 48 (L) 46 (L)   Calcium 8.5 8.6 8.9   Anion Gap 10 9 9    Albumin 3.2 (L) 3.2 (L) 3.2 (L)   Protein Total 7.0 7.0 7.1   Bilirubin Total 2.2 (H) 1.8 (H) 1.8 (H)   Alkaline Phosphatase 79 72 80   ALT 24 22 23   AST 31 29 12   \   8/2/2017 09:33   Cyclosporine Level 202     ASSESSMENT/PLAN: 71 year old female with AA, now has been transfusion INDEPENDENT since April 2017, still dealing with fatigue, OCASIO and low stamina.    HEME- Treatment for AA 1/9-1/13/17 with ATG, methylpred, cyclosporine and eltrombopag. Has been off her prednisone and ppx medications since spring.  Her stamina has improved since I saw her last and counts are now independent from transfusions.   -cyclosporine 125 mg BID level is therapeutic today  -eltrombopag 150 mg daily  -counts  q3 w  -transfuse if hgb <7.5 or symptomatic and platelets <30k     CV-two prior provoked DVT in 2012 and 2014 with travel. 2012 was associated with PE. Was on warfarin/lovenox in the past.  Transitioned to ASA which has been held in the setting of low platelets.  Off all BP medications    ID-  No fever or symptoms of infection. No ppx medications   - CMV was neg  - monthly pentamidine    MSK- low back -Low thoracic mid spine pain.  Improving with PT    FEN: weight down a couple pounds.  Feels her appetite has improved but she is losing weight.  Nutrition consult provided.  Discussed adding in Ensure daily.          Sejal Walls PA-C

## 2017-08-17 ENCOUNTER — OFFICE VISIT (OUTPATIENT)
Dept: TRANSPLANT | Facility: CLINIC | Age: 72
End: 2017-08-17
Attending: INTERNAL MEDICINE
Payer: COMMERCIAL

## 2017-08-17 VITALS
TEMPERATURE: 97 F | HEIGHT: 62 IN | WEIGHT: 117.5 LBS | DIASTOLIC BLOOD PRESSURE: 87 MMHG | RESPIRATION RATE: 16 BRPM | SYSTOLIC BLOOD PRESSURE: 145 MMHG | BODY MASS INDEX: 21.62 KG/M2 | OXYGEN SATURATION: 96 % | HEART RATE: 81 BPM

## 2017-08-17 DIAGNOSIS — D61.9 APLASTIC ANEMIA (H): Primary | ICD-10-CM

## 2017-08-17 PROCEDURE — 99213 OFFICE O/P EST LOW 20 MIN: CPT | Mod: ZF

## 2017-08-17 ASSESSMENT — PAIN SCALES - GENERAL: PAINLEVEL: MILD PAIN (2)

## 2017-08-17 NOTE — PROGRESS NOTES
"/87 (BP Location: Left arm, Patient Position: Sitting, Cuff Size: Adult Regular)  Pulse 81  Temp 97  F (36.1  C)  Resp 16  Ht 1.575 m (5' 2.01\")  Wt 53.3 kg (117 lb 8 oz)  SpO2 96%  BMI 21.49 kg/m2   Wt Readings from Last 4 Encounters:   08/17/17 53.3 kg (117 lb 8 oz)   08/02/17 53.3 kg (117 lb 8 oz)   07/13/17 53.5 kg (117 lb 14.4 oz)   06/22/17 54.3 kg (119 lb 11.2 oz)     Bonny returns to follow-up her aplastic anemia ~ 7 months after her ATG cyclosporine and steroids therapy. She continues on cyclosporine and eltrombopag. She's had no transfusions for 4 months and her neutrophils are low normal; platelets have been reasonably steady at 40,000 or so.    She's had no recent infections bleeding chills or fever. Her shortness of breath is improving and she is working on some additional efforts at physical therapy pending insurance clearance. She was also referred for some nutritional counseling because she still is losing some weight but she is again  concerned whether insurance will cover the counseling advice. She was urged to call and pursue that.    She has no new pain in any site except for chronic low back pain and she has not needed oxycodone in a long period of time. She has no rash, bleeding, bruising, new bone and joint, GI, or respiratory symptomatology. The rest of her review of systems is negative.    Her exam shows her weight down a bit more, down 2 kg from June , but her vital signs are otherwise acceptable. Most blood pressures been normal even though today is slightly elevated systolic. She has no rash or peripheral edema. She has no bruises or petechiae in any site. Oropharynx is clear. There is no mucosal lesions. Her lungs are clear without rales or wheezing; heart tones show a soft ejection murmur but no gallop rhythm. She has no palpable abdominal tenderness, hepatosplenomegaly or masses. There are no other abnormal physical findings.    Laboratory testing done 2 weeks ago showed " the stable hemoglobin just below 8 and acceptable but low white count and platelets. There is no indication for transfusion support.   She'll continue on cyclosporine and eltrombopag without change. We will continue to monitor blood counts and a retic count, now monthly and I'll see her in 2 months time, She'll see her other physician assistant provider in 4 weeks .     Rafita Rogel M.D.    Results for LISSETH PARIKH (MRN [id number]) as of 8/17/2017 16:28   Ref. Range 8/2/2017 09:32 8/2/2017 09:33   Sodium Latest Ref Range: 133 - 144 mmol/L 139    Potassium Latest Ref Range: 3.4 - 5.3 mmol/L 4.2    Chloride Latest Ref Range: 94 - 109 mmol/L 107    Carbon Dioxide Latest Ref Range: 20 - 32 mmol/L 23    Urea Nitrogen Latest Ref Range: 7 - 30 mg/dL 40 (H)    Creatinine Latest Ref Range: 0.52 - 1.04 mg/dL 1.36 (H)    GFR Estimate Latest Ref Range: >60 mL/min/1.7m2 38 (L)    GFR Estimate If Black Latest Ref Range: >60 mL/min/1.7m2 46 (L)    Calcium Latest Ref Range: 8.5 - 10.1 mg/dL 8.9    Anion Gap Latest Ref Range: 3 - 14 mmol/L 9    Albumin Latest Ref Range: 3.4 - 5.0 g/dL 3.2 (L)    Protein Total Latest Ref Range: 6.8 - 8.8 g/dL 7.1    Bilirubin Total Latest Ref Range: 0.2 - 1.3 mg/dL 1.8 (H)    Alkaline Phosphatase Latest Ref Range: 40 - 150 U/L 80    ALT Latest Ref Range: 0 - 50 U/L 23    AST Latest Ref Range: 0 - 45 U/L 12    Glucose Latest Ref Range: 70 - 99 mg/dL 88    WBC Latest Ref Range: 4.0 - 11.0 10e9/L 3.8 (L)    Hemoglobin Latest Ref Range: 11.7 - 15.7 g/dL 7.8 (L)    Hematocrit Latest Ref Range: 35.0 - 47.0 % 24.1 (L)    Platelet Count Latest Ref Range: 150 - 450 10e9/L 38 (LL)    RBC Count Latest Ref Range: 3.8 - 5.2 10e12/L 1.95 (L)    MCV Latest Ref Range: 78 - 100 fl 124 (H)    MCH Latest Ref Range: 26.5 - 33.0 pg 40.0 (H)    MCHC Latest Ref Range: 31.5 - 36.5 g/dL 32.4    RDW Latest Ref Range: 10.0 - 15.0 % 14.3    Diff Method Unknown Automated Method    % Neutrophils Latest Units: % 46.1    %  Lymphocytes Latest Units: % 42.3    % Monocytes Latest Units: % 9.4    % Eosinophils Latest Units: % 1.6    % Basophils Latest Units: % 0.3    % Immature Granulocytes Latest Units: % 0.3    Nucleated RBCs Latest Ref Range: 0 /100 0    Absolute Neutrophil Latest Ref Range: 1.6 - 8.3 10e9/L 1.8    Absolute Lymphocytes Latest Ref Range: 0.8 - 5.3 10e9/L 1.6    Absolute Monocytes Latest Ref Range: 0.0 - 1.3 10e9/L 0.4    Absolute Eosinophils Latest Ref Range: 0.0 - 0.7 10e9/L 0.1    Absolute Basophils Latest Ref Range: 0.0 - 0.2 10e9/L 0.0    Abs Immature Granulocytes Latest Ref Range: 0 - 0.4 10e9/L 0.0    Absolute Nucleated RBC Unknown 0.0    ABO Unknown  A   RH(D) Unknown  Pos   Antibody Screen Unknown  Neg   Test Valid Only At [facility]..   Specimen Expires Unknown  08/05/2017   Blood Component Type Unknown  Red Blood Cells L...   Unit Number Unknown  [id number]   Division Number Unknown  00   Status of Unit Unknown  No longer availab...   Crossmatch Unknown  Red Blood Cells   Unit Status Unknown  RET   Cyclosporine Last Dose Unknown  last dose 10 pm 8...   Cyclosporine Level Latest Ref Range: 50 - 400 ug/L  202

## 2017-08-17 NOTE — LETTER
"8/17/2017      RE: Bonny Raymundo  [address]  [facility] [id number]       /87 (BP Location: Left arm, Patient Position: Sitting, Cuff Size: Adult Regular)  Pulse 81  Temp 97  F (36.1  C)  Resp 16  Ht 1.575 m (5' 2.01\")  Wt 53.3 kg (117 lb 8 oz)  SpO2 96%  BMI 21.49 kg/m2   Wt Readings from Last 4 Encounters:   08/17/17 53.3 kg (117 lb 8 oz)   08/02/17 53.3 kg (117 lb 8 oz)   07/13/17 53.5 kg (117 lb 14.4 oz)   06/22/17 54.3 kg (119 lb 11.2 oz)     Bonny returns to follow-up her aplastic anemia ~ 7 months after her ATG cyclosporine and steroids therapy. She continues on cyclosporine and eltrombopag. She's had no transfusions for 4 months and her neutrophils are low normal; platelets have been reasonably steady at 40,000 or so.    She's had no recent infections bleeding chills or fever. Her shortness of breath is improving and she is working on some additional efforts at physical therapy pending insurance clearance. She was also referred for some nutritional counseling because she still is losing some weight but she is again  concerned whether insurance will cover the counseling advice. She was urged to call and pursue that.    She has no new pain in any site except for chronic low back pain and she has not needed oxycodone in a long period of time. She has no rash, bleeding, bruising, new bone and joint, GI, or respiratory symptomatology. The rest of her review of systems is negative.    Her exam shows her weight down a bit more, down 2 kg from June , but her vital signs are otherwise acceptable. Most blood pressures been normal even though today is slightly elevated systolic. She has no rash or peripheral edema. She has no bruises or petechiae in any site. Oropharynx is clear. There is no mucosal lesions. Her lungs are clear without rales or wheezing; heart tones show a soft ejection murmur but no gallop rhythm. She has no palpable abdominal tenderness, hepatosplenomegaly or masses. There " are no other abnormal physical findings.    Laboratory testing done 2 weeks ago showed the stable hemoglobin just below 8 and acceptable but low white count and platelets. There is no indication for transfusion support.   She'll continue on cyclosporine and eltrombopag without change. We will continue to monitor blood counts and a retic count, now monthly and I'll see her in 2 months time, She'll see her other physician assistant provider in 4 weeks .     Rafita Rogel M.D.    Results for LISSETH PARIKH (MRN [id number]) as of 8/17/2017 16:28   Ref. Range 8/2/2017 09:32 8/2/2017 09:33   Sodium Latest Ref Range: 133 - 144 mmol/L 139    Potassium Latest Ref Range: 3.4 - 5.3 mmol/L 4.2    Chloride Latest Ref Range: 94 - 109 mmol/L 107    Carbon Dioxide Latest Ref Range: 20 - 32 mmol/L 23    Urea Nitrogen Latest Ref Range: 7 - 30 mg/dL 40 (H)    Creatinine Latest Ref Range: 0.52 - 1.04 mg/dL 1.36 (H)    GFR Estimate Latest Ref Range: >60 mL/min/1.7m2 38 (L)    GFR Estimate If Black Latest Ref Range: >60 mL/min/1.7m2 46 (L)    Calcium Latest Ref Range: 8.5 - 10.1 mg/dL 8.9    Anion Gap Latest Ref Range: 3 - 14 mmol/L 9    Albumin Latest Ref Range: 3.4 - 5.0 g/dL 3.2 (L)    Protein Total Latest Ref Range: 6.8 - 8.8 g/dL 7.1    Bilirubin Total Latest Ref Range: 0.2 - 1.3 mg/dL 1.8 (H)    Alkaline Phosphatase Latest Ref Range: 40 - 150 U/L 80    ALT Latest Ref Range: 0 - 50 U/L 23    AST Latest Ref Range: 0 - 45 U/L 12    Glucose Latest Ref Range: 70 - 99 mg/dL 88    WBC Latest Ref Range: 4.0 - 11.0 10e9/L 3.8 (L)    Hemoglobin Latest Ref Range: 11.7 - 15.7 g/dL 7.8 (L)    Hematocrit Latest Ref Range: 35.0 - 47.0 % 24.1 (L)    Platelet Count Latest Ref Range: 150 - 450 10e9/L 38 (LL)    RBC Count Latest Ref Range: 3.8 - 5.2 10e12/L 1.95 (L)    MCV Latest Ref Range: 78 - 100 fl 124 (H)    MCH Latest Ref Range: 26.5 - 33.0 pg 40.0 (H)    MCHC Latest Ref Range: 31.5 - 36.5 g/dL 32.4    RDW Latest Ref Range: 10.0 - 15.0 %  14.3    Diff Method Unknown Automated Method    % Neutrophils Latest Units: % 46.1    % Lymphocytes Latest Units: % 42.3    % Monocytes Latest Units: % 9.4    % Eosinophils Latest Units: % 1.6    % Basophils Latest Units: % 0.3    % Immature Granulocytes Latest Units: % 0.3    Nucleated RBCs Latest Ref Range: 0 /100 0    Absolute Neutrophil Latest Ref Range: 1.6 - 8.3 10e9/L 1.8    Absolute Lymphocytes Latest Ref Range: 0.8 - 5.3 10e9/L 1.6    Absolute Monocytes Latest Ref Range: 0.0 - 1.3 10e9/L 0.4    Absolute Eosinophils Latest Ref Range: 0.0 - 0.7 10e9/L 0.1    Absolute Basophils Latest Ref Range: 0.0 - 0.2 10e9/L 0.0    Abs Immature Granulocytes Latest Ref Range: 0 - 0.4 10e9/L 0.0    Absolute Nucleated RBC Unknown 0.0    ABO Unknown  A   RH(D) Unknown  Pos   Antibody Screen Unknown  Neg   Test Valid Only At [facility]..   Specimen Expires Unknown  08/05/2017   Blood Component Type Unknown  Red Blood Cells L...   Unit Number Unknown  [id number]   Division Number Unknown  00   Status of Unit Unknown  No longer availab...   Crossmatch Unknown  Red Blood Cells   Unit Status Unknown  RET   Cyclosporine Last Dose Unknown  last dose 10 pm 8...   Cyclosporine Level Latest Ref Range: 50 - 400 ug/L  202       Rafita Rogel MD

## 2017-08-17 NOTE — MR AVS SNAPSHOT
After Visit Summary   8/17/2017    Bonny Raymundo    MRN: [id number]           Patient Information     Date Of Birth          1945        Visit Information        Provider Department      8/17/2017 3:30 PM Rafita Rogel MD [facility] Blood and Marrow Transplant        Today's Diagnoses     Aplastic anemia (H)    -  1          [facility] ([facility]  [address] [place] [address]  Phone: [phone]  Clinic Hours:   Monday-Thursday:7am to 7pm   Friday: 7am to 5pm   Weekends and holidays:    8am to noon (in general)  If your fever is 100.5  or greater,   call the clinic.  After hours call the   hospital at [phone] or   [phone]. Ask for the BMT   fellow on-call            Follow-ups after your visit        Follow-up notes from your care team     Return for Physical Exam, Lab Work, Routine Visit.      Your next 10 appointments already scheduled     Sep 15, 2017  3:45 PM CDT   Neuro Treatment with Natalie Mckinnon, PT   [facility] Physical Therapy ([facility])    [address] [place] [address]   [phone]            Sep 20, 2017  8:45 AM CDT   Masonic Lab Draw with  MASONIC LAB DRAW   [facility] Lab Draw ([facility])    [address] [place] [id number] [phone]            Sep 20, 2017  9:30 AM CDT   (Arrive by 9:15 AM)   Return Visit with Celine Walls PA-C   [facility] Cancer [facility] ([facility])    [address] [place] [address] [phone]            Sep 20, 2017 11:00 AM CDT   Infusion 60 with UC ONCOLOGY INFUSION, UC 18 ATC   [facility] ([facility])    [address] [place] [id number] [phone]            Sep 22, 2017  9:45 AM CDT   Neuro Treatment with Natalie Mckinnon, PT    [facility] Physical Therapy ([facility])    [address] [place] [address]  [address]  [place] [address]   [phone]            Sep 29, 2017  9:45 AM CDT   Neuro Treatment with Natalie Mckinnon, PT   [facility] Physical Therapy ([facility])    [address] [place] [address]  [address]  [place] [address]   [phone]            Oct 04, 2017 10:00 AM CDT   Level 5 with  INFUSION CHAIR 19   [facility] ([facility])    [facility] [place]  [address]  [place] [address]   [phone]            Oct 12, 2017  4:00 PM CDT   Masonic Lab Draw with  MASONIC LAB DRAW   [facility] Masonic Lab Draw ([facility])    [address] [id number] [phone]            Oct 12, 2017  4:30 PM CDT   RETURN ONC with Rafita Rogel MD   [facility] Blood and Marrow Transplant ([facility])    [address] [address] [phone]              Who to contact     If you have questions or need follow up information about today's clinic visit or your schedule please contact [facility] BLOOD AND MARROW TRANSPLANT directly at [phone].  Normal or non-critical lab and imaging results will be communicated to you by Personics Labshart, letter or phone within 4 business days after the clinic has received the results. If you do not hear from us within 7 days, please contact the clinic through Personics Labshart or phone. If you have a critical or abnormal lab result, we will notify you by phone as soon as possible.  Submit refill requests through Shoop or call your pharmacy and they will forward the refill request to us. Please allow 3 business days for your refill to be completed.          Additional Information About Your Visit        Shoop Information     Shoop lets you send messages to your doctor, view your test results,  "renew your prescriptions, schedule appointments and more. To sign up, go to [email or web address] . Click on \"Log in\" on the left side of the screen, which will take you to the Welcome page. Then click on \"Sign up Now\" on the right side of the page.     You will be asked to enter the access code listed below, as well as some personal information. Please follow the directions to create your username and password.     Your access code is: [email or web address]  Expires: 2017  9:26 AM     Your access code will  in 90 days. If you need help or a new code, please call your [place] clinic or [phone].        Care EveryWhere ID     This is your Care EveryWhere ID. This could be used by other organizations to access your [place] medical records  [id number]        Your Vitals Were     Pulse Temperature Respirations Height Pulse Oximetry BMI (Body Mass Index)    81 97  F (36.1  C) 16 1.575 m (5' 2.01\") 96% 21.49 kg/m2       Blood Pressure from Last 3 Encounters:   17 145/87   17 132/85   17 145/82    Weight from Last 3 Encounters:   17 53.3 kg (117 lb 8 oz)   17 53.3 kg (117 lb 8 oz)   17 53.5 kg (117 lb 14.4 oz)               Recent Review Flowsheet Data     BMT Recent Results Latest Ref Rng & Units 2017    WBC 4.0 - 11.0 10e9/L 3.9(L) 3.5(L) - 3.6(L) 4.3 3.8(L) 3.7(L)    Hemoglobin 11.7 - 15.7 g/dL 8.0(L) 7.9(L) - 7.7(L) 7.9(L) 7.8(L) 8.1(L)    Platelet Count 150 - 450 10e9/L 36(LL) 38(LL) - 39(LL) 40(LL) 38(LL) 40(LL)    Neutrophils (Absolute) 1.6 - 8.3 10e9/L 2.1 1.5(L) - 1.4(L) 1.9 1.8 2.1    INR 0.86 - 1.14 - - - - - - -    Sodium 133 - 144 mmol/L 142 137 - 139 137 139 140    Potassium 3.4 - 5.3 mmol/L 4.4 6.5(HH) 4.1 5.5(H) 5.9(H) 4.2 4.1    Chloride 94 - 109 mmol/L 110(H) 109 - 108 108 107 108    Glucose 70 - 99 mg/dL 118(H) 105(H) - 160(H) 141(H) 88 107(H)    Urea Nitrogen 7 - 30 mg/dL 31(H) 37(H) - 32(H) " 31(H) 40(H) 43(H)    Creatinine 0.52 - 1.04 mg/dL 1.23(H) 1.18(H) - 1.10(H) 1.31(H) 1.36(H) 1.25(H)    Calcium (Total) 8.5 - 10.1 mg/dL 8.6 8.1(L) - 8.5 8.6 8.9 8.9    Protein (Total) 6.8 - 8.8 g/dL 7.0 7.0 - 7.0 7.0 7.1 7.2    Albumin 3.4 - 5.0 g/dL 3.2(L) 3.1(L) - 3.2(L) 3.2(L) 3.2(L) 3.4    Bilirubin (Direct) 0.0 - 0.2 mg/dL - - - - - - -    Alkaline Phosphatase 40 - 150 U/L 89 87 - 79 72 80 80    AST 0 - 45 U/L 15 32 - 31 29 12 16    ALT 0 - 50 U/L 21 24 - 24 22 23 25    MCV 78 - 100 fl 115(H) 118(H) - 121(H) 119(H) 124(H) 117(H)               Primary Care Provider Office Phone # Fax #    Shivani Phelps PA-C [phone] [address] [facility] [address]  [facility] [address]        Equal Access to Services     CHARLES NOLASCO AH: Hadii aad ku hadasho Somichael, waaxda luqadaha, qaybta kaalmada denzel, ambika strong . So [facility] [phone].    ATENCIÓN: Si habla español, tiene a robertson disposición servicios gratuitos de asistencia lingüística. Cayden al [phone].    We comply with applicable federal civil rights laws and [place] laws. We do not discriminate on the basis of race, color, national origin, age, disability sex, sexual orientation or gender identity.            Thank you!     Thank you for choosing [facility] BLOOD AND MARROW TRANSPLANT  for your care. Our goal is always to provide you with excellent care. Hearing back from our patients is one way we can continue to improve our services. Please take a few minutes to complete the written survey that you may receive in the mail after your visit with us. Thank you!             Your Updated Medication List - Protect others around you: Learn how to safely use, store and throw away your medicines at www.disposemymeds.org.          This list is accurate as of: 8/17/17 11:59 PM.  Always use your most recent med list.                   Brand Name Dispense Instructions for use Diagnosis    BENADRYL ALLERGY 25 MG tablet   Generic  drug:  diphenhydrAMINE     56 tablet    Take 1 tablet (25 mg) by mouth nightly as needed        chlorpheniramine 4 MG tablet    CHLOR-TRIMETON     Take 4 mg by mouth every 6 hours as needed. For head cold        COMPRESSION STOCKINGS     2 each    Wear compression stockings at 20-30 mmHg rating most time during the day to the affected leg (left leg) or both legs. Take them off at night.    DVT (deep venous thrombosis), left, Postphlebitic syndrome       * cycloSPORINE modified 25 MG capsule    GENERIC EQUIVALENT    540 capsule    Take 1 capsule (25 mg) by mouth 2 times daily Or as directed    Aplastic anemia (H)       * cycloSPORINE modified 100 MG capsule    GENERIC EQUIVALENT    60 capsule    Take 1 capsule (100 mg) by mouth 2 times daily Reported on 5/4/2017    Gastroesophageal reflux disease without esophagitis, Left-sided low back pain without sciatica, unspecified chronicity, Constipation, unspecified constipation type, Hemorrhoids, unspecified hemorrhoid type       diclofenac 1 % Gel topical gel    VOLTAREN    100 g    Apply 2 g topically 2 times daily    Myofascial pain       eltrombopag 50 MG tablet    PROMACTA    90 tablet    Take 3 tablets (150 mg) by mouth daily Administer on an empty stomach, 1 hour before or 2 hours after a meal. Or as directed    Idiopathic aplastic anemia (H), Pancytopenia (H)       fluticasone 50 MCG/ACT spray    FLONASE    1 Bottle    Spray 1-2 sprays into both nostrils daily    Rhinitis medicamentosa, Bilateral impacted cerumen, Nasal congestion       loratadine 10 MG tablet    CLARITIN     Take 10 mg by mouth daily as needed Reported on 4/26/2017        order for DME     1 Box    Equipment being ordered: knee high compression stockings- 18-20 mm    DVT prophylaxis       oxyCODONE 5 MG IR tablet    ROXICODONE    50 tablet    Take 1 tablet (5 mg) by mouth every 6 hours as needed for moderate to severe pain    Left-sided low back pain without sciatica, unspecified chronicity        pantoprazole 40 MG EC tablet    PROTONIX    60 tablet    Take 1 tablet (40 mg) by mouth 2 times daily    Gastroesophageal reflux disease without esophagitis, Left-sided low back pain without sciatica, unspecified chronicity, Constipation, unspecified constipation type, Hemorrhoids, unspecified hemorrhoid type       TYLENOL PO      Take 325 mg by mouth every 6 hours as needed for mild pain or fever        * Notice:  This list has 2 medication(s) that are the same as other medications prescribed for you. Read the directions carefully, and ask your doctor or other care provider to review them with you.

## 2017-08-17 NOTE — NURSING NOTE
"Oncology Rooming Note    August 17, 2017 3:50 PM   Bonny Raymundo is a 71 year old female who presents for:    Chief Complaint   Patient presents with     Oncology Clinic Visit     Return visit related to Aplastic Anemia     Initial Vitals: /87 (BP Location: Left arm, Patient Position: Sitting, Cuff Size: Adult Regular)  Pulse 81  Temp 97  F (36.1  C)  Resp 16  Ht 1.575 m (5' 2.01\")  Wt 53.3 kg (117 lb 8 oz)  SpO2 96%  BMI 21.49 kg/m2 Estimated body mass index is 21.49 kg/(m^2) as calculated from the following:    Height as of this encounter: 1.575 m (5' 2.01\").    Weight as of this encounter: 53.3 kg (117 lb 8 oz). Body surface area is 1.53 meters squared.  Mild Pain (2) Comment: Data Unavailable   No LMP recorded. Patient has had a hysterectomy.  Allergies reviewed: Yes  Medications reviewed: Yes    Medications: Medication refills not needed today.  Pharmacy name entered into [facility]:    [facility], [place] - [address]  [facility], [place] - [address]  ONCOLOGY RX CARE [facility], [place] - [address] [place] [address]. [address]  WRITTEN PRESCRIPTION REQUESTED    Clinical concerns: No new concerns. Provider was NOT notified.    10 minutes for nursing intake (face to face time)      Sydnee Patel LPN            "

## 2017-08-18 ENCOUNTER — HOSPITAL ENCOUNTER (OUTPATIENT)
Dept: PHYSICAL THERAPY | Facility: CLINIC | Age: 72
Setting detail: THERAPIES SERIES
End: 2017-08-18
Attending: INTERNAL MEDICINE
Payer: COMMERCIAL

## 2017-08-18 PROCEDURE — 97110 THERAPEUTIC EXERCISES: CPT | Mod: GP | Performed by: PHYSICAL THERAPIST

## 2017-08-18 PROCEDURE — 40000185 ZZHC STATISTIC PT OUTPT VISIT: Performed by: PHYSICAL THERAPIST

## 2017-08-22 ENCOUNTER — TRANSFERRED RECORDS (OUTPATIENT)
Dept: HEALTH INFORMATION MANAGEMENT | Facility: CLINIC | Age: 72
End: 2017-08-22

## 2017-08-23 ENCOUNTER — ONCOLOGY VISIT (OUTPATIENT)
Dept: ONCOLOGY | Facility: CLINIC | Age: 72
End: 2017-08-23
Attending: INTERNAL MEDICINE
Payer: COMMERCIAL

## 2017-08-23 ENCOUNTER — HOSPITAL ENCOUNTER (OUTPATIENT)
Facility: CLINIC | Age: 72
Setting detail: SPECIMEN
Discharge: HOME | End: 2017-08-23
Attending: INTERNAL MEDICINE | Admitting: INTERNAL MEDICINE
Payer: COMMERCIAL

## 2017-08-23 DIAGNOSIS — D61.818 PANCYTOPENIA (H): ICD-10-CM

## 2017-08-23 DIAGNOSIS — D61.9 APLASTIC ANEMIA (H): ICD-10-CM

## 2017-08-23 LAB
ABO + RH BLD: NORMAL
ABO + RH BLD: NORMAL
ALBUMIN SERPL-MCNC: 3.4 G/DL (ref 3.4–5)
ALP SERPL-CCNC: 80 U/L (ref 40–150)
ALT SERPL W P-5'-P-CCNC: 25 U/L (ref 0–50)
ANION GAP SERPL CALCULATED.3IONS-SCNC: 9 MMOL/L (ref 3–14)
AST SERPL W P-5'-P-CCNC: 16 U/L (ref 0–45)
BASOPHILS # BLD AUTO: 0 10E9/L (ref 0–0.2)
BASOPHILS NFR BLD AUTO: 0 %
BILIRUB SERPL-MCNC: 2.1 MG/DL (ref 0.2–1.3)
BLD GP AB SCN SERPL QL: NORMAL
BLOOD BANK CMNT PATIENT-IMP: NORMAL
BUN SERPL-MCNC: 43 MG/DL (ref 7–30)
CALCIUM SERPL-MCNC: 8.9 MG/DL (ref 8.5–10.1)
CHLORIDE SERPL-SCNC: 108 MMOL/L (ref 94–109)
CO2 SERPL-SCNC: 23 MMOL/L (ref 20–32)
CREAT SERPL-MCNC: 1.25 MG/DL (ref 0.52–1.04)
CYCLOSPORINE BLD LC/MS/MS-MCNC: 161 UG/L (ref 50–400)
DIFFERENTIAL METHOD BLD: ABNORMAL
EOSINOPHIL # BLD AUTO: 0.1 10E9/L (ref 0–0.7)
EOSINOPHIL NFR BLD AUTO: 1.9 %
ERYTHROCYTE [DISTWIDTH] IN BLOOD BY AUTOMATED COUNT: 14.2 % (ref 10–15)
GFR SERPL CREATININE-BSD FRML MDRD: 42 ML/MIN/1.7M2
GLUCOSE SERPL-MCNC: 107 MG/DL (ref 70–99)
HCT VFR BLD AUTO: 23.8 % (ref 35–47)
HGB BLD-MCNC: 8.1 G/DL (ref 11.7–15.7)
IMM GRANULOCYTES # BLD: 0 10E9/L (ref 0–0.4)
IMM GRANULOCYTES NFR BLD: 0 %
LYMPHOCYTES # BLD AUTO: 1.2 10E9/L (ref 0.8–5.3)
LYMPHOCYTES NFR BLD AUTO: 31.6 %
MCH RBC QN AUTO: 39.7 PG (ref 26.5–33)
MCHC RBC AUTO-ENTMCNC: 34 G/DL (ref 31.5–36.5)
MCV RBC AUTO: 117 FL (ref 78–100)
MONOCYTES # BLD AUTO: 0.4 10E9/L (ref 0–1.3)
MONOCYTES NFR BLD AUTO: 10.6 %
NEUTROPHILS # BLD AUTO: 2.1 10E9/L (ref 1.6–8.3)
NEUTROPHILS NFR BLD AUTO: 55.9 %
NRBC # BLD AUTO: 0 10*3/UL
NRBC BLD AUTO-RTO: 0 /100
PLATELET # BLD AUTO: 40 10E9/L (ref 150–450)
POTASSIUM SERPL-SCNC: 4.1 MMOL/L (ref 3.4–5.3)
PROT SERPL-MCNC: 7.2 G/DL (ref 6.8–8.8)
RBC # BLD AUTO: 2.04 10E12/L (ref 3.8–5.2)
RETICS # AUTO: 52.6 10E9/L (ref 25–95)
RETICS/RBC NFR AUTO: 2.6 % (ref 0.5–2)
SODIUM SERPL-SCNC: 140 MMOL/L (ref 133–144)
SPECIMEN EXP DATE BLD: NORMAL
TME LAST DOSE: NORMAL H
WBC # BLD AUTO: 3.7 10E9/L (ref 4–11)

## 2017-08-23 PROCEDURE — 85025 COMPLETE CBC W/AUTO DIFF WBC: CPT | Performed by: INTERNAL MEDICINE

## 2017-08-23 PROCEDURE — 85045 AUTOMATED RETICULOCYTE COUNT: CPT | Performed by: INTERNAL MEDICINE

## 2017-08-23 PROCEDURE — 86900 BLOOD TYPING SEROLOGIC ABO: CPT | Performed by: INTERNAL MEDICINE

## 2017-08-23 PROCEDURE — 80158 DRUG ASSAY CYCLOSPORINE: CPT | Performed by: INTERNAL MEDICINE

## 2017-08-23 PROCEDURE — 36415 COLL VENOUS BLD VENIPUNCTURE: CPT

## 2017-08-23 PROCEDURE — 86901 BLOOD TYPING SEROLOGIC RH(D): CPT | Performed by: INTERNAL MEDICINE

## 2017-08-23 PROCEDURE — 86850 RBC ANTIBODY SCREEN: CPT | Performed by: INTERNAL MEDICINE

## 2017-08-23 PROCEDURE — 80053 COMPREHEN METABOLIC PANEL: CPT | Performed by: INTERNAL MEDICINE

## 2017-08-23 NOTE — PROGRESS NOTES
Medical Assistant Note:  Bonny Raymundo presents today for lab visit.    Patient seen by provider today: No.   present during visit today: Not Applicable.    Concerns: No Concerns.    Procedure:  Lab draw site: LAC, Needle type: BF, Gauge: 21 g gauze and coban applied.    Post Assessment:  Labs drawn without difficulty: Yes.    Discharge Plan:  Departure Mode: Ambulatory.    Face to Face Time: 4.    Clarice Garcia MA

## 2017-08-23 NOTE — MR AVS SNAPSHOT
After Visit Summary   8/23/2017    Bonny Raymundo    MRN: [id number]           Patient Information     Date Of Birth          1945        Visit Information        Provider Department      8/23/2017 9:15 AM Nurse,  Oncology [facility]        Today's Diagnoses     Pancytopenia (H)        Aplastic anemia (H)           Follow-ups after your visit        Your next 10 appointments already scheduled     Aug 25, 2017 10:30 AM CDT   Neuro Treatment with Natalie Mckinnon, PT   [facility] Physical Therapy ([facility])    [address] [place] [address] [address]  [place] [address]   [phone]            Sep 01, 2017 10:00 AM CDT   Neuro Treatment with Natalie Mckinnon, PT   [facility] Physical Therapy ([facility])    [address] [place] [address]  [place] [address]   [phone]            Sep 08, 2017 10:00 AM CDT   Neuro Treatment with Natalie Mckinnon, PT   [facility] Physical Therapy ([facility])    [address] [place] [address]  [place] [address]   [phone]            Sep 13, 2017  8:15 AM CDT   Masonic Lab Draw with  MASONIC LAB DRAW   [facility] ([facility])    [address] [place] [address]   [phone]            Sep 13, 2017  8:40 AM CDT   (Arrive by 8:25 AM)   Return Visit with Celine Walls PA-C   [facility] ([facility])    [address] [place] [address]   [phone]            Sep 22, 2017  9:45 AM CDT   Neuro Treatment with Natalie Mckinnon, PT   [facility] Physical Therapy ([facility])    [address] [place] [address] [address]  [place] [address]   [phone]            Oct 04, 2017 10:00 AM CDT   Level 5 with  INFUSION CHAIR 19   [facility]fusion [facility] ([facility]  [facility])    [facility]  [address]  [facility] [address]   [phone]            Oct 26, 2017  4:00 PM CDT   Masonic Lab Draw with  MASONIC LAB DRAW   [facility] Masonic Lab Draw ([facility])    [address] [id number] [phone]            Oct 26, 2017  4:30 PM CDT   RETURN ONC with Rafita Rogel MD   [facility] Blood and Marrow Transplant ([facility])    [address] [phone] [phone]              Who to contact     If you have questions or need follow up information about today's clinic visit or your schedule please contact [facility] CANCER [facility] directly at [phone].  Normal or non-critical lab and imaging results will be communicated to you by Tempronicshart, letter or phone within 4 business days after the clinic has received the results. If you do not hear from us within 7 days, please contact the clinic through Tempronicshart or phone. If you have a critical or abnormal lab result, we will notify you by phone as soon as possible.  Submit refill requests through COH or call your pharmacy and they will forward the refill request to us. Please allow 3 business days for your refill to be completed.          Additional Information About Your Visit        Tempronicshart Information     COH lets you send messages to your doctor, view your test results, renew your prescriptions, schedule appointments and more. To sign up, go to [email or web address] . Click on \"Log in\" on the left side of the screen, which will take you to the Welcome page. Then click on \"Sign up Now\" on the right side of the page.     You will be asked to enter the access code listed below, as well as some personal information. Please follow the directions to create your username and password.     Your access code is: [email or web address]  Expires: 9/12/2017  9:26 AM     Your access code " will  in 90 days. If you need help or a new code, please call your [facility] or [phone].        Care EveryWhere ID     This is your Care EveryWhere ID. This could be used by other organizations to access your [place] medical records  [id number]         Blood Pressure from Last 3 Encounters:   17 145/87   17 132/85   17 145/82    Weight from Last 3 Encounters:   17 53.3 kg (117 lb 8 oz)   17 53.3 kg (117 lb 8 oz)   17 53.5 kg (117 lb 14.4 oz)              We Performed the Following     ABO/Rh type and screen     CBC with platelets differential     Comprehensive metabolic panel     Cyclosporine     Reticulocyte count        Primary Care Provider Office Phone # Fax #    Shivani Phelps PA-C [phone] [address]  [place] [address]        Equal Access to Services     LANDON NOLASCO : Hadii aad ku hadasho Soomaali, waaxda luqadaha, qaybta kaalmada adeegyada, waxay reubenin lizandro strong . So [facility] [phone].    ATENCIÓN: Si salvador lima, tiene a robertson disposición servicios gratuitos de asistencia lingüística. Llame al [phone].    We comply with applicable federal civil rights laws and [place] laws. We do not discriminate on the basis of race, color, national origin, age, disability sex, sexual orientation or gender identity.            Thank you!     Thank you for choosing [facility] CANCER [facility]  for your care. Our goal is always to provide you with excellent care. Hearing back from our patients is one way we can continue to improve our services. Please take a few minutes to complete the written survey that you may receive in the mail after your visit with us. Thank you!             Your Updated Medication List - Protect others around you: Learn how to safely use, store and throw away your medicines at www.disposemymeds.org.          This list is accurate as of: 17  9:40 AM.  Always use your most recent med  list.                   Brand Name Dispense Instructions for use Diagnosis    BENADRYL ALLERGY 25 MG tablet   Generic drug:  diphenhydrAMINE     56 tablet    Take 1 tablet (25 mg) by mouth nightly as needed        chlorpheniramine 4 MG tablet    CHLOR-TRIMETON     Take 4 mg by mouth every 6 hours as needed. For head cold        COMPRESSION STOCKINGS     2 each    Wear compression stockings at 20-30 mmHg rating most time during the day to the affected leg (left leg) or both legs. Take them off at night.    DVT (deep venous thrombosis), left, Postphlebitic syndrome       * cycloSPORINE modified 25 MG capsule    GENERIC EQUIVALENT    540 capsule    Take 1 capsule (25 mg) by mouth 2 times daily Or as directed    Aplastic anemia (H)       * cycloSPORINE modified 100 MG capsule    GENERIC EQUIVALENT    60 capsule    Take 1 capsule (100 mg) by mouth 2 times daily Reported on 5/4/2017    Gastroesophageal reflux disease without esophagitis, Left-sided low back pain without sciatica, unspecified chronicity, Constipation, unspecified constipation type, Hemorrhoids, unspecified hemorrhoid type       diclofenac 1 % Gel topical gel    VOLTAREN    100 g    Apply 2 g topically 2 times daily    Myofascial pain       eltrombopag 50 MG tablet    PROMACTA    90 tablet    Take 3 tablets (150 mg) by mouth daily Administer on an empty stomach, 1 hour before or 2 hours after a meal. Or as directed    Idiopathic aplastic anemia (H), Pancytopenia (H)       fluticasone 50 MCG/ACT spray    FLONASE    1 Bottle    Spray 1-2 sprays into both nostrils daily    Rhinitis medicamentosa, Bilateral impacted cerumen, Nasal congestion       loratadine 10 MG tablet    CLARITIN     Take 10 mg by mouth daily as needed Reported on 4/26/2017        order for DME     1 Box    Equipment being ordered: knee high compression stockings- 18-20 mm    DVT prophylaxis       oxyCODONE 5 MG IR tablet    ROXICODONE    50 tablet    Take 1 tablet (5 mg) by mouth every 6  hours as needed for moderate to severe pain    Left-sided low back pain without sciatica, unspecified chronicity       pantoprazole 40 MG EC tablet    PROTONIX    60 tablet    Take 1 tablet (40 mg) by mouth 2 times daily    Gastroesophageal reflux disease without esophagitis, Left-sided low back pain without sciatica, unspecified chronicity, Constipation, unspecified constipation type, Hemorrhoids, unspecified hemorrhoid type       TYLENOL PO      Take 325 mg by mouth every 6 hours as needed for mild pain or fever        * Notice:  This list has 2 medication(s) that are the same as other medications prescribed for you. Read the directions carefully, and ask your doctor or other care provider to review them with you.

## 2017-08-25 ENCOUNTER — HOSPITAL ENCOUNTER (OUTPATIENT)
Dept: PHYSICAL THERAPY | Facility: CLINIC | Age: 72
Setting detail: THERAPIES SERIES
End: 2017-08-25
Attending: INTERNAL MEDICINE
Payer: COMMERCIAL

## 2017-08-25 PROCEDURE — 97110 THERAPEUTIC EXERCISES: CPT | Mod: GP | Performed by: PHYSICAL THERAPIST

## 2017-08-25 PROCEDURE — 40000719 ZZHC STATISTIC PT DEPARTMENT NEURO VISIT: Performed by: PHYSICAL THERAPIST

## 2017-08-25 PROCEDURE — 97112 NEUROMUSCULAR REEDUCATION: CPT | Mod: GP | Performed by: PHYSICAL THERAPIST

## 2017-09-01 ENCOUNTER — HOSPITAL ENCOUNTER (OUTPATIENT)
Dept: PHYSICAL THERAPY | Facility: CLINIC | Age: 72
Setting detail: THERAPIES SERIES
End: 2017-09-01
Attending: INTERNAL MEDICINE
Payer: COMMERCIAL

## 2017-09-01 PROCEDURE — 40000185 ZZHC STATISTIC PT OUTPT VISIT: Performed by: PHYSICAL THERAPIST

## 2017-09-01 PROCEDURE — 97110 THERAPEUTIC EXERCISES: CPT | Mod: GP | Performed by: PHYSICAL THERAPIST

## 2017-09-08 ENCOUNTER — HOSPITAL ENCOUNTER (OUTPATIENT)
Dept: PHYSICAL THERAPY | Facility: CLINIC | Age: 72
Setting detail: THERAPIES SERIES
End: 2017-09-08
Attending: INTERNAL MEDICINE
Payer: COMMERCIAL

## 2017-09-08 PROCEDURE — 97110 THERAPEUTIC EXERCISES: CPT | Mod: GP | Performed by: PHYSICAL THERAPIST

## 2017-09-08 PROCEDURE — 40000719 ZZHC STATISTIC PT DEPARTMENT NEURO VISIT: Performed by: PHYSICAL THERAPIST

## 2017-09-15 ENCOUNTER — HOSPITAL ENCOUNTER (OUTPATIENT)
Dept: PHYSICAL THERAPY | Facility: CLINIC | Age: 72
Setting detail: THERAPIES SERIES
End: 2017-09-15
Attending: INTERNAL MEDICINE
Payer: COMMERCIAL

## 2017-09-15 PROCEDURE — 40000185 ZZHC STATISTIC PT OUTPT VISIT: Performed by: PHYSICAL THERAPIST

## 2017-09-15 PROCEDURE — 97110 THERAPEUTIC EXERCISES: CPT | Mod: GP | Performed by: PHYSICAL THERAPIST

## 2017-09-20 ENCOUNTER — ONCOLOGY VISIT (OUTPATIENT)
Dept: ONCOLOGY | Facility: CLINIC | Age: 72
End: 2017-09-20
Attending: INTERNAL MEDICINE
Payer: COMMERCIAL

## 2017-09-20 ENCOUNTER — APPOINTMENT (OUTPATIENT)
Dept: LAB | Facility: CLINIC | Age: 72
End: 2017-09-20
Attending: INTERNAL MEDICINE
Payer: COMMERCIAL

## 2017-09-20 VITALS
SYSTOLIC BLOOD PRESSURE: 134 MMHG | OXYGEN SATURATION: 99 % | DIASTOLIC BLOOD PRESSURE: 75 MMHG | RESPIRATION RATE: 18 BRPM | BODY MASS INDEX: 21.14 KG/M2 | WEIGHT: 114.9 LBS | HEART RATE: 101 BPM | HEIGHT: 62 IN | TEMPERATURE: 98.5 F

## 2017-09-20 DIAGNOSIS — D61.3 IDIOPATHIC APLASTIC ANEMIA (H): ICD-10-CM

## 2017-09-20 LAB
ABO + RH BLD: NORMAL
ABO + RH BLD: NORMAL
BLD GP AB SCN SERPL QL: NORMAL
BLOOD BANK CMNT PATIENT-IMP: NORMAL
SPECIMEN EXP DATE BLD: NORMAL

## 2017-09-20 PROCEDURE — 86901 BLOOD TYPING SEROLOGIC RH(D): CPT | Performed by: PHYSICIAN ASSISTANT

## 2017-09-20 PROCEDURE — 99212 OFFICE O/P EST SF 10 MIN: CPT | Mod: ZF

## 2017-09-20 PROCEDURE — 36415 COLL VENOUS BLD VENIPUNCTURE: CPT

## 2017-09-20 PROCEDURE — 99214 OFFICE O/P EST MOD 30 MIN: CPT | Mod: ZP | Performed by: PHYSICIAN ASSISTANT

## 2017-09-20 PROCEDURE — 86900 BLOOD TYPING SEROLOGIC ABO: CPT | Performed by: PHYSICIAN ASSISTANT

## 2017-09-20 PROCEDURE — 86850 RBC ANTIBODY SCREEN: CPT | Performed by: PHYSICIAN ASSISTANT

## 2017-09-20 ASSESSMENT — PAIN SCALES - GENERAL: PAINLEVEL: MILD PAIN (2)

## 2017-09-20 NOTE — PROGRESS NOTES
[facility] CANCER CLINIC  FOLLOW-UP VISIT NOTE  Date of visit:   9-20-17        REASON FOR VISIT: Aplastic anemia, routine 4 week follow up    HPI: Bonny is a 71 year old female who presented in the fall of 2016 with fatigue and shortness of breath. 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  Her counts showed pancytopenia and BMBX was concerning for aplastic anemia.  By December of 2016 she was transfusion dependent with platelets under 10 k and ANC dropped under 500. 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  She has met with Dr Mcdaniel at [facility] and consulted with Dr Rogel at [facility].      After further consultation it was decided to admit on 1/9/17 for ATG/cyclosporine/prednisone therapy.     The following was her inpatient regimen:  Day 1= 1/9/17  ATG 2500 mg (40 mg/kg0 q24 hours D-4)  Cyclosporine 200 mg (3 mg/kg) PO bid  Eltrombopag 50 mg daily  Methylpred 31 mg (0.5 mg/kg) q24 hours D1-4  Prednisone 60 mg (1 mg/kg) daily after completion of IV methylpred to continue for at least 2 weeks    Bonny also had a admission 1/23-1/25/17 for rectal bleeding (presumed hemorrhoidal). Otherwise has done well with no other complications requiring hospitalization.     INTERVAL HISTORY: Bonny is here today for her q4w follow up. Bonny continues to make some good strides of improvement.  Her fatigue persists, but is more managable and some days she wakes up and feels real energy.  Her unsteadiness, back pain and stamina are better with PT.  She is still mildly dyspneic but now walking her dogs around the block again.  She feels her dysphagia is better and that she is eating more, but is concerned that her weight is going down- however she feels this has finally plateued.  She is eating three times a day and using Boost, so is unsure why this is going on.  Couldn't afford a nutritionist.     No fevers, chest pain and and no bleeding. Daily  "BM, no black/blood. No  issues. She has a tremor that is mild.  She just had bilateral myringotomy tubes placed and her hearing is better.     ROS: complete review of systems was performed which was negative unless noted above.    EXAM:  /75 (BP Location: Left arm, Patient Position: Chair, Cuff Size: Adult Regular)  Pulse 101  Temp 98.5  F (36.9  C) (Oral)  Resp 18  Ht 1.575 m (5' 2.01\")  Wt 52.1 kg (114 lb 14.4 oz)  SpO2 99%  BMI 21.01 kg/m2  Wt Readings from Last 4 Encounters:   09/20/17 52.1 kg (114 lb 14.4 oz)   08/17/17 53.3 kg (117 lb 8 oz)   08/02/17 53.3 kg (117 lb 8 oz)   07/13/17 53.5 kg (117 lb 14.4 oz)     General: Patient alert and oriented x3.  She looks better/stronger better skin coloring than I have seen her in a while.   EYES: PERRLA, conjunctiva pink, sclera is jaundice.   Neck: No palpable cervical, supraclavicular or axillary nodes bilaterally.   Cardiovascular: RRR, no murmurs, gallops or rubs  Respiratory: clear to auscultation bilaterally;  no crackles, rhonchi or wheezing.   Abdomen: positive bowel sounds in all four quadrants, soft, nontender  Skin: light jaundice, intact  Neuro: grossly intact.   Mood and affect is stable.       LABS:      8/2/2017 09:32 8/23/2017 09:33 9/20/2017 09:04   WBC 3.8 (L) 3.7 (L) 3.6 (L)   Hemoglobin 7.8 (L) 8.1 (L) 8.6 (L)   Hematocrit 24.1 (L) 23.8 (L) 25.7 (L)   Platelet Count 38 (LL) 40 (LL) 46 (LL)   RBC Count 1.95 (L) 2.04 (L) 2.16 (L)    (H) 117 (H) 119 (H)      9/20/2017 09:04   Sodium 137   Potassium 3.6   Chloride 105   Carbon Dioxide 24   Urea Nitrogen 28   Creatinine 1.29 (H)   GFR Estimate 40 (L)   GFR Estimate If Black 49 (L)   Calcium 9.0   Anion Gap 8   Albumin 3.6   Protein Total 7.7   Bilirubin Total 2.0 (H)   Alkaline Phosphatase 78   ALT 25   AST 14   Glucose 115 (H)     ASSESSMENT/PLAN: 71 year old female with AA, now has been transfusion INDEPENDENT since April 2017, still dealing with fatigue, OCASIO and low stamina but " improving monthly.    HEME- Treatment for AA 1/9-1/13/17 with ATG, methylpred, cyclosporine and eltrombopag. Has been off her prednisone and ppx medications since spring.  Her stamina has improved since I saw her last and counts are now independent from transfusions since April of 2017.   -cyclosporine 125 mg BID level is therapeutic today  -eltrombopag 150 mg daily  -counts  q3 w  -transfuse if hgb <7.5 or symptomatic and platelets <30k   -q4-6 weeks for labs/visit    CV-two prior provoked DVT in 2012 and 2014 with travel. 2012 was associated with PE. Was on warfarin/lovenox in the past.  Transitioned to ASA which has been held in the setting of low platelets.  Off all BP medications    ID-  No fever or symptoms of infection. No ppx medications   - CMV was neg  - monthly pentamidine can stop now    MSK- low back -Low thoracic mid spine pain.  Improving with PT.  Has h/o compression fractures.  Discussed getting bone density to look for osteoporosis.     FEN: weight down a couple pounds.  Feels her appetite has improved but she is losing weight.  Nutrition consult not covered by insurance.  Continue Ensure daily.          Sejal Walls PA-C

## 2017-09-20 NOTE — NURSING NOTE
Chief Complaint   Patient presents with     Blood Draw     labs drawn from left arm via venipuncture and vitals taken by CHRIS Giang CMA September 20, 2017

## 2017-09-20 NOTE — MR AVS SNAPSHOT
After Visit Summary   9/20/2017    Bonny Raymundo    MRN: [id number]           Patient Information     Date Of Birth          1945        Visit Information        Provider Department      9/20/2017 9:30 AM Celine Walls PA-C [facility]        Today's Diagnoses     Idiopathic aplastic anemia (H)           Follow-ups after your visit        Your next 10 appointments already scheduled     Sep 20, 2017 11:00 AM CDT   Infusion 60 with  ONCOLOGY INFUSION, UC 18 ATC   [facility] ([facility])    [address] [facility] [address]  [address] [facility] [address]   [phone]            Sep 22, 2017  9:45 AM CDT   Neuro Treatment with Natalie Mckinnon, PT   [facility] Physical Therapy ([facility])    [address]  [address]  [facility] [address]   [phone]            Sep 29, 2017  9:45 AM CDT   Neuro Treatment with Natalie Mckinnon, PT   [facility] ([facility])    [address]  [address]  [facility] [address]   [phone]            Oct 04, 2017 10:00 AM CDT   Level 5 with  INFUSION CHAIR 19   [facility] ([facility])    [facility]  [address]  [facility] [address]   [phone]            Oct 12, 2017  4:00 PM CDT   Masonic Lab Draw with  MASONIC LAB DRAW   [facility] Masonic Lab Draw ([facility])    [address] [address]   [phone]            Oct 12, 2017  4:30 PM CDT   RETURN ONC with Rafita Rogel MD   [facility] Blood and Marrow Transplant ([facility])    [address] [place] [address]   [phone]            Nov 15, 2017  9:45 AM CST   Masonic Lab Draw with  MASONIC LAB DRAW   [facility] Masonic Lab Draw ([facility]  "Surgery Center)    [address] [place] [id number] [phone]            Nov 15, 2017 10:20 AM CST   (Arrive by 10:05 AM)   Return Visit with Celine Walls PA-C   [facility] ([facility])    [address] [place] [id number] [phone]              Who to contact     If you have questions or need follow up information about today's clinic visit or your schedule please contact [facility] CANCER [facility] directly at [phone].  Normal or non-critical lab and imaging results will be communicated to you by MyChart, letter or phone within 4 business days after the clinic has received the results. If you do not hear from us within 7 days, please contact the clinic through [employer] or phone. If you have a critical or abnormal lab result, we will notify you by phone as soon as possible.  Submit refill requests through [employer] or call your pharmacy and they will forward the refill request to us. Please allow 3 business days for your refill to be completed.          Additional Information About Your Visit        [employer] Information     [employer] lets you send messages to your doctor, view your test results, renew your prescriptions, schedule appointments and more. To sign up, go to [email or web address] . Click on \"Log in\" on the left side of the screen, which will take you to the Welcome page. Then click on \"Sign up Now\" on the right side of the page.     You will be asked to enter the access code listed below, as well as some personal information. Please follow the directions to create your username and password.     Your access code is: [email or web address]  Expires: 2017 10:51 AM     Your access code will  in 90 days. If you need help or a new code, please call your [place] clinic or [phone].        Care EveryWhere ID     This is your Care EveryWhere ID. This could be used by other organizations to " "access your [facility] medical records  [id number]        Your Vitals Were     Pulse Temperature Respirations Height Pulse Oximetry BMI (Body Mass Index)    101 98.5  F (36.9  C) (Oral) 18 1.575 m (5' 2.01\") 99% 21.01 kg/m2       Blood Pressure from Last 3 Encounters:   09/20/17 134/75   08/17/17 145/87   08/02/17 132/85    Weight from Last 3 Encounters:   09/20/17 52.1 kg (114 lb 14.4 oz)   08/17/17 53.3 kg (117 lb 8 oz)   08/02/17 53.3 kg (117 lb 8 oz)              We Performed the Following     ABO/Rh type and screen          Today's Medication Changes          These changes are accurate as of: 9/20/17 10:51 AM.  If you have any questions, ask your nurse or doctor.               These medicines have changed or have updated prescriptions.        Dose/Directions    fluticasone 50 MCG/ACT spray   Commonly known as:  FLONASE   This may have changed:    - when to take this  - reasons to take this   Used for:  Rhinitis medicamentosa, Bilateral impacted cerumen, Nasal congestion        Dose:  1-2 spray   Spray 1-2 sprays into both nostrils daily   Quantity:  1 Bottle   Refills:  11       pantoprazole 40 MG EC tablet   Commonly known as:  PROTONIX   This may have changed:  when to take this   Used for:  Gastroesophageal reflux disease without esophagitis, Left-sided low back pain without sciatica, unspecified chronicity, Constipation, unspecified constipation type, Hemorrhoids, unspecified hemorrhoid type        Dose:  40 mg   Take 1 tablet (40 mg) by mouth 2 times daily   Quantity:  60 tablet   Refills:  3                Primary Care Provider Office Phone # Fax #    Shivani Phelps PA-C [phone] [address] [facility] [address]  [facility] [address]        Equal Access to Services     CHARLES NOLASCO : Misty Hines, wacharlotte lynn, qaybta kaalmada denzel, ambika Barraza [facility] [phone].    ATENCIÓN: Si habla español, tiene a robertson disposición servicios gratuitos " de asistencia lingüística. Cayden mejia [phone].    We comply with applicable federal civil rights laws and [place] laws. We do not discriminate on the basis of race, color, national origin, age, disability sex, sexual orientation or gender identity.            Thank you!     Thank you for choosing [facility] CANCER CLINIC  for your care. Our goal is always to provide you with excellent care. Hearing back from our patients is one way we can continue to improve our services. Please take a few minutes to complete the written survey that you may receive in the mail after your visit with us. Thank you!             Your Updated Medication List - Protect others around you: Learn how to safely use, store and throw away your medicines at www.disposemymeds.org.          This list is accurate as of: 9/20/17 10:51 AM.  Always use your most recent med list.                   Brand Name Dispense Instructions for use Diagnosis    BENADRYL ALLERGY 25 MG tablet   Generic drug:  diphenhydrAMINE     56 tablet    Take 25 mg by mouth At Bedtime        chlorpheniramine 4 MG tablet    CHLOR-TRIMETON     Take 4 mg by mouth every 6 hours as needed. For head cold        COMPRESSION STOCKINGS     2 each    Wear compression stockings at 20-30 mmHg rating most time during the day to the affected leg (left leg) or both legs. Take them off at night.    DVT (deep venous thrombosis), left, Postphlebitic syndrome       * cycloSPORINE modified 25 MG capsule    GENERIC EQUIVALENT    540 capsule    Take 1 capsule (25 mg) by mouth 2 times daily Or as directed    Aplastic anemia (H)       * cycloSPORINE modified 100 MG capsule    GENERIC EQUIVALENT    60 capsule    Take 1 capsule (100 mg) by mouth 2 times daily Reported on 5/4/2017    Gastroesophageal reflux disease without esophagitis, Left-sided low back pain without sciatica, unspecified chronicity, Constipation, unspecified constipation type, Hemorrhoids, unspecified hemorrhoid type        diclofenac 1 % Gel topical gel    VOLTAREN    100 g    Apply 2 g topically 2 times daily    Myofascial pain       eltrombopag 50 MG tablet    PROMACTA    90 tablet    Take 3 tablets (150 mg) by mouth daily Administer on an empty stomach, 1 hour before or 2 hours after a meal. Or as directed    Idiopathic aplastic anemia (H), Pancytopenia (H)       fluticasone 50 MCG/ACT spray    FLONASE    1 Bottle    Spray 1-2 sprays into both nostrils daily    Rhinitis medicamentosa, Bilateral impacted cerumen, Nasal congestion       loratadine 10 MG tablet    CLARITIN     Take 10 mg by mouth daily as needed Reported on 4/26/2017        order for DME     1 Box    Equipment being ordered: knee high compression stockings- 18-20 mm    DVT prophylaxis       oxyCODONE 5 MG IR tablet    ROXICODONE    50 tablet    Take 1 tablet (5 mg) by mouth every 6 hours as needed for moderate to severe pain    Left-sided low back pain without sciatica, unspecified chronicity       pantoprazole 40 MG EC tablet    PROTONIX    60 tablet    Take 1 tablet (40 mg) by mouth 2 times daily    Gastroesophageal reflux disease without esophagitis, Left-sided low back pain without sciatica, unspecified chronicity, Constipation, unspecified constipation type, Hemorrhoids, unspecified hemorrhoid type       TYLENOL PO      Take 325 mg by mouth every 6 hours as needed for mild pain or fever        * Notice:  This list has 2 medication(s) that are the same as other medications prescribed for you. Read the directions carefully, and ask your doctor or other care provider to review them with you.

## 2017-09-20 NOTE — NURSING NOTE
"Oncology Rooming Note    September 20, 2017 9:37 AM   Bonny Raymundo is a 71 year old female who presents for:    Chief Complaint   Patient presents with     Blood Draw     labs drawn from left arm via venipuncture and vitals taken by [facility]      Oncology Clinic Visit     Aplastic Anemia F/U     Initial Vitals: /75 (BP Location: Left arm, Patient Position: Chair, Cuff Size: Adult Regular)  Pulse 101  Temp 98.5  F (36.9  C) (Oral)  Resp 18  Ht 1.575 m (5' 2.01\")  Wt 52.1 kg (114 lb 14.4 oz)  SpO2 99%  BMI 21.01 kg/m2 Estimated body mass index is 21.01 kg/(m^2) as calculated from the following:    Height as of this encounter: 1.575 m (5' 2.01\").    Weight as of this encounter: 52.1 kg (114 lb 14.4 oz). Body surface area is 1.51 meters squared.  Mild Pain (2) Comment: back pain    No LMP recorded. Patient has had a hysterectomy.  Allergies reviewed: Yes  Medications reviewed: Yes    Medications: Medication refills not needed today.  Pharmacy name entered into [employer]:    [facility], [place] - [address]  [facility], [place] - [address]  [facility] [place] [place] - [address] [place] [address]. [address]  WRITTEN PRESCRIPTION REQUESTED    Clinical concerns: None Taisha Walls was NOT notified.    7 minutes for nursing intake (face to face time)     Clarice Munoz LPN              "

## 2017-09-22 ENCOUNTER — HOSPITAL ENCOUNTER (OUTPATIENT)
Dept: PHYSICAL THERAPY | Facility: CLINIC | Age: 72
Setting detail: THERAPIES SERIES
End: 2017-09-22
Attending: INTERNAL MEDICINE
Payer: COMMERCIAL

## 2017-09-22 DIAGNOSIS — M81.0 OSTEOPOROSIS, UNSPECIFIED OSTEOPOROSIS TYPE, UNSPECIFIED PATHOLOGICAL FRACTURE PRESENCE: Primary | ICD-10-CM

## 2017-09-22 PROCEDURE — 97110 THERAPEUTIC EXERCISES: CPT | Mod: GP | Performed by: PHYSICAL THERAPIST

## 2017-09-22 PROCEDURE — 40000185 ZZHC STATISTIC PT OUTPT VISIT: Performed by: PHYSICAL THERAPIST

## 2017-09-29 ENCOUNTER — HOSPITAL ENCOUNTER (OUTPATIENT)
Dept: PHYSICAL THERAPY | Facility: CLINIC | Age: 72
Setting detail: THERAPIES SERIES
End: 2017-09-29
Attending: INTERNAL MEDICINE
Payer: COMMERCIAL

## 2017-09-29 ENCOUNTER — HOSPITAL ENCOUNTER (OUTPATIENT)
Dept: BONE DENSITY | Facility: CLINIC | Age: 72
Discharge: HOME | End: 2017-09-29
Attending: PHYSICIAN ASSISTANT | Admitting: PHYSICIAN ASSISTANT
Payer: COMMERCIAL

## 2017-09-29 DIAGNOSIS — M81.0 OSTEOPOROSIS, UNSPECIFIED OSTEOPOROSIS TYPE, UNSPECIFIED PATHOLOGICAL FRACTURE PRESENCE: ICD-10-CM

## 2017-09-29 PROCEDURE — G8979 MOBILITY GOAL STATUS: HCPCS | Mod: GP,CJ | Performed by: PHYSICAL THERAPIST

## 2017-09-29 PROCEDURE — 97750 PHYSICAL PERFORMANCE TEST: CPT | Mod: GP | Performed by: PHYSICAL THERAPIST

## 2017-09-29 PROCEDURE — G8980 MOBILITY D/C STATUS: HCPCS | Mod: GP,CJ | Performed by: PHYSICAL THERAPIST

## 2017-09-29 PROCEDURE — 40000719 ZZHC STATISTIC PT DEPARTMENT NEURO VISIT: Performed by: PHYSICAL THERAPIST

## 2017-09-29 PROCEDURE — 77085 DXA BONE DENSITY AXL VRT FX: CPT

## 2017-09-29 PROCEDURE — 97110 THERAPEUTIC EXERCISES: CPT | Mod: GP | Performed by: PHYSICAL THERAPIST

## 2017-09-29 NOTE — PROGRESS NOTES
08/01/17 0900   Quick Adds   Type of Visit Initial OP PT Evaluation   General Information   Start of Care Date 08/01/17   Referring Physician Rafita Rogel MD   Orders Evaluate and Treat as Indicated   Order Date 07/13/17   Medical Diagnosis Aplastic anemia, weakness   Onset of illness/injury or Date of Surgery 07/13/17  (Diagnosed last Oct/November with aplastic anemia)   Precautions/Limitations other (see comments)  (Hgb 7,  Platelets 40 at last blood draw. )   Surgical/Medical history reviewed Yes   Pertinent history of current problem (include personal factors and/or comorbidities that impact the POC) The pt presents with weakness and deconditioning. The pt has aplastic anemia. Recently had a PICC removed because she is no longer transfusion dependent. Has had difficulty maintaining weight and is very fatigued. Only able to walk short distances before becoming SOB. Also states that she can only stand about 5 minutes at a time. Hgb is slowly improving. PMH also significant for DVT and PE.    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 No   Per patient - Fall with injury in past year? No   Timed Up and Go score (seconds) 8.9   Fall screen comments yes per FGA   Pain   Patient currently in pain No   Pain comments but mild discomfort at low back along 12th rib   Cognitive Status Examination   Orientation orientation to person, place and time   Posture   Posture Forward head position;Protracted shoulders   Palpation   Palpation No pain to palpation along 12 rib   Range of  Motion (ROM)   ROM Comment R UE limited to 110 degrees of flexion, otherwise WFLs   Strength   Strength Comments UEs grossly 4/5, B hip flexion 4/5, B knee extension 4/5, hip abduction 4-/5, dorsiflexion 4+/5   Bed Mobility   Bed Mobility Comments Independent   Transfer Skills   Transfer Comments Able without use of UEs   Gait   Gait Comments M/L path deviations noted, especially with increased fatigue   Gait Special Tests   Gait Special Tests FUNCTIONAL GAIT ASSESSMENT;SIX MINUTE WALK TEST   Gait Special Tests Functional Gait Assessment Score out of 30   Score out of 30 21   Comments requires 1 rest break   Gait Special Tests Six Minute Walk Test   Feet 700 Feet   Comments needing to rest after 2 minutes   Balance Special Tests Sit to Stand Reps in 30 Seconds   Comments 5 reps in 15:30 seconds   Sensory Examination   Sensory Perception no deficits were identified   Coordination   Coordination no deficits were identified   Planned Therapy Interventions   Planned Therapy Interventions balance training;gait training;neuromuscular re-education;ROM;strengthening;stretching;transfer training   Clinical Impression   Criteria for Skilled Therapeutic Interventions Met yes, treatment indicated   PT Diagnosis Weakness and deconditioning   Influenced by the following impairments UE and LE weakness, fatigue, limited endurance, low lab values (platelets, Hgb)   Functional limitations due to impairments Repeated transfers, walking household and community distances, fall risk   Clinical Presentation Evolving/Changing   Clinical Presentation Rationale fatigue, fluctuating lab values, weight loss   Clinical Decision Making (Complexity) Moderate complexity   Therapy Frequency 1 time/week  (for 4 weeks, decreasing to 1 visit every other week for 4 we)   Predicted Duration of Therapy Intervention (days/wks) 12 weeks   Risk & Benefits of therapy have been explained Yes   Patient, Family & other staff in agreement with plan of care Yes    Clinical Impression Comments The pt would benefit from visits spread across a longer time period due to severe deconditioning and low lab values. Wll beneft from strengthening and conditioning program as well as balance training to decrease fall risk   Education Assessment   Preferred Learning Style Demonstration;Pictures/video   Barriers to Learning Hearing  ([place])   GOALS   PT Eval Goals 1;2;3;4   Goal 1   Goal Identifier 6MWT   Goal Description The pt will be able to ambulate 900' in 6 minutes without resting in order to improve ability to ambulate in the community   Target Date 11/06/17   Goal 2   Goal Identifier FGA   Goal Description The pt will improve score on the FGA to 24 points due to improvements in static and dynamic balance and therefore a reduction in fall risk   Target Date 11/06/17   Goal 3   Goal Identifier Standing tolerance   Goal Description The pt will be able to stand x 15 min in order to improve ability to prepare meals and complete other ADLs/IADLs   Target Date 11/06/17   Goal 4   Goal Identifier HEP   Goal Description The pt will be independent and consistently complete a HEP focusing on full body strengthening and balance in order to safely progress strength and mobility outside of therapy.   Target Date 11/06/17   Total Evaluation Time   Total Evaluation Time (Minutes) 45

## 2017-09-29 NOTE — PROGRESS NOTES
Outpatient Physical Therapy Discharge Note     Patient: Bonny Raymundo  : 1945    Beginning/End Dates of Reporting Period:  17 to 2017    Referring Provider: Dr. Pebbles MD    Therapy Diagnosis: Weakness and deconditioning     Client Self Report: Reports that yesterday was a good day but today is not feeling as strong. Realizes she may have pushed her self too much    Objective Measurements:  Objective Measure: 6MWT  Details: 1020', required no rest break.     Objective Measure: FGA  Details:                Goals:  Goal Identifier 6MWT   Goal Description The pt will be able to ambulate 900' in 6 minutes without resting in order to improve ability to ambulate in the community   Target Date 17   Date Met      Progress:     Goal Identifier FGA   Goal Description The pt will improve score on the FGA to 24 points due to improvements in static and dynamic balance and therefore a reduction in fall risk   Target Date 17   Date Met      Progress:     Goal Identifier Standing tolerance   Goal Description The pt will be able to stand x 15 min in order to improve ability to prepare meals and complete other ADLs/IADLs   Target Date 17   Date Met      Progress:     Goal Identifier HEP   Goal Description The pt will be independent and consistently complete a HEP focusing on full body strengthening and balance in order to safely progress strength and mobility outside of therapy.   Target Date 17   Date Met      Progress:     Goal Identifier     Goal Description     Target Date     Date Met      Progress:     Goal Identifier     Goal Description     Target Date     Date Met      Progress:     Goal Identifier     Goal Description     Target Date     Date Met      Progress:     Goal Identifier     Goal Description     Target Date     Date Met      Progress:     Progress Toward Goals:   Progress this reporting period: The pt made great progress with her activity tolerance, gait and  dynamic balance this reporting period. Her 6 minute walk test distance improved from 700' to 1020'. Was able to complete test without a rest break today. Her FGA score improved from a 21/30 to a 24/30, indicating a reduction in fall risk. The pt still fatigues relatively quickly however also feels that she is able to recover faster. The pt understands exercises that she can complete at home and then at the gym when she is ready to return.     Plan:  Discharge from therapy.    Discharge:    Reason for Discharge: ready to continue with HEP.      Discharge Plan: Patient to continue home program.

## 2017-09-29 NOTE — PROGRESS NOTES
09/29/17 1000   Signing Clinician's Name / Credentials   Signing clinician's name / credentials QUENTIN Mckinnon PT   Functional Gait Assessment (BARB Macario, SUSY Beard, et al. (2004))   1. GAIT LEVEL SURFACE 3   2. CHANGE IN GAIT SPEED 2   3. GAIT WITH HORIZONTAL HEAD TURNS 3   4. GAIT WITH VERTICAL HEAD TURNS 3   5. GAIT AND PIVOT TURN 3   6. STEP OVER OBSTACLE 3   7. GAIT WITH NARROW BASE OF SUPPORT 1   8. GAIT WITH EYES CLOSED 1   9. AMBULATING BACKWARDS 2   10. STEPS 3   Total Functional Gait Assessment Score   TOTAL SCORE: (MAXIMUM SCORE 30) 24   Functional Gait Assessment (FGA): The FGA assesses postural stability during various walking tasks.   Gait assistive device used: no AD     Patient Score: 24/30  Scores of <22/30 have been correlated with predicting falls in community-dwelling older adults according to Amirah & Tj 2010.   Scores of <18/30 have been correlated with increased risk for falls in patients with Parkinsons Disease according to Sebastian Hernandez, Melvin et al 2014.  Minimal Detectable Change for patients with acute/chronic stroke = 4.2 according to Thicésar & Ritschel 2009  Minimal Detectable Change for patients with vestibular disorder = 8 according to Amirah & Tj 2010    Assessment (rationale for performing, application to patient s function & care plan): The pt demonstrated improvements with gait speed, gait with head turns, stairs, and tandem gait. Still finds narrowed base of support challenging as well as speed changes and retroambulation. The pt is above the cut off for fall risk but should still be cautious in environments with low lighting.   Minutes billed as physical performance test: 10

## 2017-10-03 DIAGNOSIS — D61.818 PANCYTOPENIA (H): ICD-10-CM

## 2017-10-03 DIAGNOSIS — D61.3 IDIOPATHIC APLASTIC ANEMIA (H): ICD-10-CM

## 2017-10-06 DIAGNOSIS — M54.50 LEFT-SIDED LOW BACK PAIN WITHOUT SCIATICA, UNSPECIFIED CHRONICITY: ICD-10-CM

## 2017-10-06 DIAGNOSIS — K59.00 CONSTIPATION, UNSPECIFIED CONSTIPATION TYPE: ICD-10-CM

## 2017-10-06 DIAGNOSIS — K21.9 GASTROESOPHAGEAL REFLUX DISEASE WITHOUT ESOPHAGITIS: ICD-10-CM

## 2017-10-06 DIAGNOSIS — K64.9 HEMORRHOIDS, UNSPECIFIED HEMORRHOID TYPE: ICD-10-CM

## 2017-10-06 NOTE — TELEPHONE ENCOUNTER
Last fill: 8/14/17  Last office visit: 9/20/17    Patient requests at least 60-days supply (quantity of #120)    Thank you,  [facility] [place]

## 2017-10-09 RX ORDER — CYCLOSPORINE 100 MG/1
100 CAPSULE, LIQUID FILLED ORAL 2 TIMES DAILY
Qty: 120 CAPSULE | Refills: 3 | Status: SHIPPED | OUTPATIENT
Start: 2017-10-09 | End: 2017-12-14

## 2017-10-12 ENCOUNTER — OFFICE VISIT (OUTPATIENT)
Dept: TRANSPLANT | Facility: CLINIC | Age: 72
End: 2017-10-12
Attending: INTERNAL MEDICINE
Payer: COMMERCIAL

## 2017-10-12 ENCOUNTER — APPOINTMENT (OUTPATIENT)
Dept: LAB | Facility: CLINIC | Age: 72
End: 2017-10-12
Attending: INTERNAL MEDICINE
Payer: COMMERCIAL

## 2017-10-12 VITALS
SYSTOLIC BLOOD PRESSURE: 137 MMHG | OXYGEN SATURATION: 96 % | HEART RATE: 86 BPM | WEIGHT: 115.4 LBS | BODY MASS INDEX: 21.1 KG/M2 | RESPIRATION RATE: 18 BRPM | DIASTOLIC BLOOD PRESSURE: 82 MMHG | TEMPERATURE: 98.4 F

## 2017-10-12 DIAGNOSIS — K59.00 CONSTIPATION, UNSPECIFIED CONSTIPATION TYPE: ICD-10-CM

## 2017-10-12 DIAGNOSIS — D61.818 PANCYTOPENIA (H): ICD-10-CM

## 2017-10-12 DIAGNOSIS — D61.3 IDIOPATHIC APLASTIC ANEMIA (H): ICD-10-CM

## 2017-10-12 DIAGNOSIS — K21.9 GASTROESOPHAGEAL REFLUX DISEASE WITHOUT ESOPHAGITIS: ICD-10-CM

## 2017-10-12 DIAGNOSIS — M54.50 LEFT-SIDED LOW BACK PAIN WITHOUT SCIATICA, UNSPECIFIED CHRONICITY: ICD-10-CM

## 2017-10-12 DIAGNOSIS — K64.9 HEMORRHOIDS, UNSPECIFIED HEMORRHOID TYPE: ICD-10-CM

## 2017-10-12 LAB
ABO + RH BLD: NORMAL
ABO + RH BLD: NORMAL
ALBUMIN SERPL-MCNC: 3.4 G/DL (ref 3.4–5)
ALP SERPL-CCNC: 72 U/L (ref 40–150)
ALT SERPL W P-5'-P-CCNC: 20 U/L (ref 0–50)
ANION GAP SERPL CALCULATED.3IONS-SCNC: 6 MMOL/L (ref 3–14)
AST SERPL W P-5'-P-CCNC: 13 U/L (ref 0–45)
BASOPHILS # BLD AUTO: 0 10E9/L (ref 0–0.2)
BASOPHILS NFR BLD AUTO: 0 %
BILIRUB SERPL-MCNC: 1.7 MG/DL (ref 0.2–1.3)
BLD GP AB SCN SERPL QL: NORMAL
BLOOD BANK CMNT PATIENT-IMP: NORMAL
BUN SERPL-MCNC: 35 MG/DL (ref 7–30)
CALCIUM SERPL-MCNC: 8.7 MG/DL (ref 8.5–10.1)
CHLORIDE SERPL-SCNC: 107 MMOL/L (ref 94–109)
CO2 SERPL-SCNC: 25 MMOL/L (ref 20–32)
CREAT SERPL-MCNC: 1.34 MG/DL (ref 0.52–1.04)
CYCLOSPORINE BLD LC/MS/MS-MCNC: 232 UG/L (ref 50–400)
DIFFERENTIAL METHOD BLD: ABNORMAL
EOSINOPHIL # BLD AUTO: 0.1 10E9/L (ref 0–0.7)
EOSINOPHIL NFR BLD AUTO: 1.8 %
ERYTHROCYTE [DISTWIDTH] IN BLOOD BY AUTOMATED COUNT: 14.1 % (ref 10–15)
GFR SERPL CREATININE-BSD FRML MDRD: 39 ML/MIN/1.7M2
GLUCOSE SERPL-MCNC: 103 MG/DL (ref 70–99)
HCT VFR BLD AUTO: 24.9 % (ref 35–47)
HGB BLD-MCNC: 8.4 G/DL (ref 11.7–15.7)
IMM GRANULOCYTES # BLD: 0 10E9/L (ref 0–0.4)
IMM GRANULOCYTES NFR BLD: 0 %
LYMPHOCYTES # BLD AUTO: 1.6 10E9/L (ref 0.8–5.3)
LYMPHOCYTES NFR BLD AUTO: 57.4 %
MCH RBC QN AUTO: 40.2 PG (ref 26.5–33)
MCHC RBC AUTO-ENTMCNC: 33.7 G/DL (ref 31.5–36.5)
MCV RBC AUTO: 119 FL (ref 78–100)
MONOCYTES # BLD AUTO: 0.2 10E9/L (ref 0–1.3)
MONOCYTES NFR BLD AUTO: 8.1 %
NEUTROPHILS # BLD AUTO: 0.9 10E9/L (ref 1.6–8.3)
NEUTROPHILS NFR BLD AUTO: 32.7 %
NRBC # BLD AUTO: 0 10*3/UL
NRBC BLD AUTO-RTO: 0 /100
PLATELET # BLD AUTO: 38 10E9/L (ref 150–450)
POTASSIUM SERPL-SCNC: 4.3 MMOL/L (ref 3.4–5.3)
PROT SERPL-MCNC: 7.1 G/DL (ref 6.8–8.8)
RBC # BLD AUTO: 2.09 10E12/L (ref 3.8–5.2)
SODIUM SERPL-SCNC: 138 MMOL/L (ref 133–144)
SPECIMEN EXP DATE BLD: NORMAL
TME LAST DOSE: NORMAL H
WBC # BLD AUTO: 2.7 10E9/L (ref 4–11)

## 2017-10-12 PROCEDURE — 86850 RBC ANTIBODY SCREEN: CPT | Performed by: PHYSICIAN ASSISTANT

## 2017-10-12 PROCEDURE — 86901 BLOOD TYPING SEROLOGIC RH(D): CPT | Performed by: PHYSICIAN ASSISTANT

## 2017-10-12 PROCEDURE — 36415 COLL VENOUS BLD VENIPUNCTURE: CPT

## 2017-10-12 PROCEDURE — 80053 COMPREHEN METABOLIC PANEL: CPT | Performed by: PHYSICIAN ASSISTANT

## 2017-10-12 PROCEDURE — 86900 BLOOD TYPING SEROLOGIC ABO: CPT | Performed by: PHYSICIAN ASSISTANT

## 2017-10-12 PROCEDURE — 85025 COMPLETE CBC W/AUTO DIFF WBC: CPT | Performed by: PHYSICIAN ASSISTANT

## 2017-10-12 PROCEDURE — 80158 DRUG ASSAY CYCLOSPORINE: CPT | Performed by: PHYSICIAN ASSISTANT

## 2017-10-12 RX ORDER — PANTOPRAZOLE SODIUM 40 MG/1
40 TABLET, DELAYED RELEASE ORAL DAILY
COMMUNITY
Start: 2017-10-12 | End: 2018-02-15

## 2017-10-12 ASSESSMENT — PAIN SCALES - GENERAL: PAINLEVEL: NO PAIN (1)

## 2017-10-12 NOTE — PROGRESS NOTES
/82  Pulse 86  Temp 98.4  F (36.9  C) (Oral)  Resp 18  Wt 52.3 kg (115 lb 6.4 oz)  SpO2 96%  BMI 21.1 kg/m2  Wt Readings from Last 4 Encounters:   10/12/17 52.3 kg (115 lb 6.4 oz)   09/20/17 52.1 kg (114 lb 14.4 oz)   08/17/17 53.3 kg (117 lb 8 oz)   08/02/17 53.3 kg (117 lb 8 oz)     Bonny returns to follow-up her aplastic anemia. She's now been on therapy for the last 10 months and is had substantial improvement to a less severe Aplasia and she has not needed transfusions in 6 or more months. In the last few weeks. She's felt better with a bit more energy and with a chiropractor, kinesiology tapes and physical therapy, her back and right anterior ribs are not as sore. She's had no recent fevers, infections or bleeding.    Today she took her cyclosporine, so her level will be erroneously high.    She has a sore tooth that likely needs removal and an implant. I emphasized that she'll need antibiotics and the oral surgeon needs to know that her platelet count is low. She was given copies of her recent blood counts today to show the oral surgeon.    The rest of her review of systems is unrevealing.    Her vital signs are acceptable and her weight is stable for the last few months. She has no petechiae in her oropharynx. Conjunctiva or ankles. There is no skin rash. Her oral mucosa is not inflamed. Her lungs are clear. Heart tones are regular. There is no gallop rhythm. Her abdomen is nontender without palpable masses or hepatosplenomegaly. She has no peripheral edema, or bone tenderness.    Her blood counts are similar. There are white count to bit lower today with neutrophil count of 900 platelets of 38,000. I told her that would be better if it was higher, but is not cause for alarm and we will recheck in a few weeks. She assures me that she still taking her cyclosporine and eltrombopag daily as directed.    She'll return in 4 weeks for recheck, but knows to call if additional issues develop.     Rafita  BIB Rogel   Professor of medicine    Results for LISSETH PARIKH (MRN [id number]) as of 10/12/2017 17:15   Ref. Range 10/12/2017 16:10   Sodium Latest Ref Range: 133 - 144 mmol/L 138   Potassium Latest Ref Range: 3.4 - 5.3 mmol/L 4.3   Chloride Latest Ref Range: 94 - 109 mmol/L 107   Carbon Dioxide Latest Ref Range: 20 - 32 mmol/L 25   Urea Nitrogen Latest Ref Range: 7 - 30 mg/dL 35 (H)   Creatinine Latest Ref Range: 0.52 - 1.04 mg/dL 1.34 (H)   GFR Estimate Latest Ref Range: >60 mL/min/1.7m2 39 (L)   GFR Estimate If Black Latest Ref Range: >60 mL/min/1.7m2 47 (L)   Calcium Latest Ref Range: 8.5 - 10.1 mg/dL 8.7   Anion Gap Latest Ref Range: 3 - 14 mmol/L 6   Albumin Latest Ref Range: 3.4 - 5.0 g/dL 3.4   Protein Total Latest Ref Range: 6.8 - 8.8 g/dL 7.1   Bilirubin Total Latest Ref Range: 0.2 - 1.3 mg/dL 1.7 (H)   Alkaline Phosphatase Latest Ref Range: 40 - 150 U/L 72   ALT Latest Ref Range: 0 - 50 U/L 20   AST Latest Ref Range: 0 - 45 U/L 13   Glucose Latest Ref Range: 70 - 99 mg/dL 103 (H)   WBC Latest Ref Range: 4.0 - 11.0 10e9/L 2.7 (L)   Hemoglobin Latest Ref Range: 11.7 - 15.7 g/dL 8.4 (L)   Hematocrit Latest Ref Range: 35.0 - 47.0 % 24.9 (L)   Platelet Count Latest Ref Range: 150 - 450 10e9/L 38 (LL)   RBC Count Latest Ref Range: 3.8 - 5.2 10e12/L 2.09 (L)   MCV Latest Ref Range: 78 - 100 fl 119 (H)   MCH Latest Ref Range: 26.5 - 33.0 pg 40.2 (H)   MCHC Latest Ref Range: 31.5 - 36.5 g/dL 33.7   RDW Latest Ref Range: 10.0 - 15.0 % 14.1   Diff Method Unknown Automated Method   % Neutrophils Latest Units: % 32.7   % Lymphocytes Latest Units: % 57.4   % Monocytes Latest Units: % 8.1   % Eosinophils Latest Units: % 1.8   % Basophils Latest Units: % 0.0   % Immature Granulocytes Latest Units: % 0.0   Nucleated RBCs Latest Ref Range: 0 /100 0   Absolute Neutrophil Latest Ref Range: 1.6 - 8.3 10e9/L 0.9 (L)   Absolute Lymphocytes Latest Ref Range: 0.8 - 5.3 10e9/L 1.6   Absolute Monocytes Latest Ref Range: 0.0  - 1.3 10e9/L 0.2   Absolute Eosinophils Latest Ref Range: 0.0 - 0.7 10e9/L 0.1   Absolute Basophils Latest Ref Range: 0.0 - 0.2 10e9/L 0.0   Abs Immature Granulocytes Latest Ref Range: 0 - 0.4 10e9/L 0.0   Absolute Nucleated RBC Unknown 0.0

## 2017-10-12 NOTE — MR AVS SNAPSHOT
After Visit Summary   10/12/2017    Bonny Raymundo    MRN: [id number]           Patient Information     Date Of Birth          1945        Visit Information        Provider Department      10/12/2017 4:30 PM Rafita Rogel MD [facility]        Today's Diagnoses     Idiopathic aplastic anemia (H)        Pancytopenia (H)        Gastroesophageal reflux disease without esophagitis        Left-sided low back pain without sciatica, unspecified chronicity        Constipation, unspecified constipation type        Hemorrhoids, unspecified hemorrhoid type              [facility] ([facility])  [address] [place] [address]  Phone: [phone]  Clinic Hours:   Monday-Thursday:7am to 7pm   Friday: 7am to 5pm   Weekends and holidays:    8am to noon (in general)  If your fever is 100.5  or greater,   call the clinic.  After hours call the   hospital at [phone] or   [phone]. Ask for the BMT   fellow on-call            Follow-ups after your visit        Follow-up notes from your care team     Return for Physical Exam, Lab Work, Routine Visit.      Your next 10 appointments already scheduled     Nov 15, 2017  9:45 AM CST   [facility] Lab Draw with  [facility] LAB DRAW   [facility] Draw ([facility])    [address] [place] [address] [id number] [phone]            Nov 15, 2017 10:20 AM CST   (Arrive by 10:05 AM)   Return Visit with Celine Walls PA-C   [facility] ([facility])    [address] [place] [id number] [phone]            Nov 15, 2017  1:00 PM CST   (Arrive by 12:45 PM)   Hearing Evaluation with Kamilah Jon   [facility] ([facility])    [address] [place] [id number] [phone]           Please see your medical professional for ear  cleaning prior to this appointment if you believe wax buildup may be an issue. All patients are required to have a physician's order stating the medical reason for the hearing test. Your doctor can send an electronic order, use their own form or we have provided a form (called Physician's Order for Audiology Services). It states that there is a medical reason for your exam. Without an order you may need to be rescheduled until the order can be obtained.            Dec 14, 2017  4:00 PM CST   [facility] Lab Draw with  [facility] LAB DRAW   [facility] Lab Draw ([facility])    [address] [place] [address] [id number] [phone]            Dec 14, 2017  4:30 PM CST   RETURN ONC with Rafita Rogel MD   [facility] Mar[facility] ([facility])    [address] [place] [id number] [phone]              Future tests that were ordered for you today     Open Future Orders        Priority Expected Expires Ordered    Comprehensive metabolic panel Routine 10/22/2017 12/12/2017 10/12/2017    CBC with platelets differential Routine 10/22/2017 12/12/2017 10/12/2017    Cyclosporine Routine 10/22/2017 12/12/2017 10/12/2017    Reticulocyte count Routine 10/22/2017 12/12/2017 10/12/2017            Who to contact     If you have questions or need follow up information about today's clinic visit or your schedule please contact [facility] directly at [phone].  Normal or non-critical lab and imaging results will be communicated to you by MyChart, letter or phone within 4 business days after the clinic has received the results. If you do not hear from us within 7 days, please contact the clinic through [employer] or phone. If you have a critical or abnormal lab result, we will notify you by phone as soon as possible.  Submit refill requests through [employer] or call your pharmacy and they will  "forward the refill request to us. Please allow 3 business days for your refill to be completed.          Additional Information About Your Visit        [employer] Information     [email or web address] lets you send messages to your doctor, view your test results, renew your prescriptions, schedule appointments and more. To sign up, go to [email or web address] . Click on \"Log in\" on the left side of the screen, which will take you to the Welcome page. Then click on \"Sign up Now\" on the right side of the page.     You will be asked to enter the access code listed below, as well as some personal information. Please follow the directions to create your username and password.     Your access code is: [email or web address]  Expires: 2017 10:51 AM     Your access code will  in 90 days. If you need help or a new code, please call your [place] clinic or [phone].        Care EveryWhere ID     This is your Care EveryWhere ID. This could be used by other organizations to access your [place] medical records  [id number]        Your Vitals Were     Pulse Temperature Respirations Pulse Oximetry BMI (Body Mass Index)       86 98.4  F (36.9  C) (Oral) 18 96% 21.1 kg/m2        Blood Pressure from Last 3 Encounters:   10/12/17 137/82   17 134/75   17 145/87    Weight from Last 3 Encounters:   10/12/17 52.3 kg (115 lb 6.4 oz)   17 52.1 kg (114 lb 14.4 oz)   17 53.3 kg (117 lb 8 oz)              We Performed the Following     ABO/Rh type and screen     CBC with platelets differential     Comprehensive metabolic panel     Cyclosporine          Today's Medication Changes          These changes are accurate as of: 10/12/17  5:16 PM.  If you have any questions, ask your nurse or doctor.               These medicines have changed or have updated prescriptions.        Dose/Directions    fluticasone 50 MCG/ACT spray   Commonly known as:  FLONASE   This may have changed:    - when to take this  - reasons to take this   Used " for:  Rhinitis medicamentosa, Bilateral impacted cerumen, Nasal congestion        Dose:  1-2 spray   Spray 1-2 sprays into both nostrils daily   Quantity:  1 Bottle   Refills:  11                Recent Review Flowsheet Data     BMT Recent Results Latest Ref Rng & Units 6/14/2017 6/28/2017 7/12/2017 8/2/2017 8/23/2017 9/20/2017 10/12/2017    WBC 4.0 - 11.0 10e9/L - 3.6(L) 4.3 3.8(L) 3.7(L) 3.6(L) 2.7(L)    Hemoglobin 11.7 - 15.7 g/dL - 7.7(L) 7.9(L) 7.8(L) 8.1(L) 8.6(L) 8.4(L)    Platelet Count 150 - 450 10e9/L - 39(LL) 40(LL) 38(LL) 40(LL) 46(LL) 38(LL)    Neutrophils (Absolute) 1.6 - 8.3 10e9/L - 1.4(L) 1.9 1.8 2.1 2.0 0.9(L)    INR 0.86 - 1.14 - - - - - - -    Sodium 133 - 144 mmol/L - 139 137 139 140 137 138    Potassium 3.4 - 5.3 mmol/L 4.1 5.5(H) 5.9(H) 4.2 4.1 3.6 4.3    Chloride 94 - 109 mmol/L - 108 108 107 108 105 107    Glucose 70 - 99 mg/dL - 160(H) 141(H) 88 107(H) 115(H) 103(H)    Urea Nitrogen 7 - 30 mg/dL - 32(H) 31(H) 40(H) 43(H) 28 35(H)    Creatinine 0.52 - 1.04 mg/dL - 1.10(H) 1.31(H) 1.36(H) 1.25(H) 1.29(H) 1.34(H)    Calcium (Total) 8.5 - 10.1 mg/dL - 8.5 8.6 8.9 8.9 9.0 8.7    Protein (Total) 6.8 - 8.8 g/dL - 7.0 7.0 7.1 7.2 7.7 7.1    Albumin 3.4 - 5.0 g/dL - 3.2(L) 3.2(L) 3.2(L) 3.4 3.6 3.4    Bilirubin (Direct) 0.0 - 0.2 mg/dL - - - - - - -    Alkaline Phosphatase 40 - 150 U/L - 79 72 80 80 78 72    AST 0 - 45 U/L - 31 29 12 16 14 13    ALT 0 - 50 U/L - 24 22 23 25 25 20    MCV 78 - 100 fl - 121(H) 119(H) 124(H) 117(H) 119(H) 119(H)               Primary Care Provider Office Phone # Fax #    Shivani Phelps PA-C [phone] [address] [address]        Equal Access to Services     LANDON NOLASCO : Hadii erica Hines, ni lynn, qaybta kaalmada ambika mahoney . [facility] [phone].    ATENCIÓN: Si habla español, tiene a robertson disposición servicios gratuitos de asistencia lingüística. Llame al  [phone].    We comply with applicable federal civil rights laws and [place] laws. We do not discriminate on the basis of race, color, national origin, age, disability, sex, sexual orientation, or gender identity.            Thank you!     Thank you for choosing [facility]  for your care. Our goal is always to provide you with excellent care. Hearing back from our patients is one way we can continue to improve our services. Please take a few minutes to complete the written survey that you may receive in the mail after your visit with us. Thank you!             Your Updated Medication List - Protect others around you: Learn how to safely use, store and throw away your medicines at www.disposemymeds.org.          This list is accurate as of: 10/12/17  5:16 PM.  Always use your most recent med list.                   Brand Name Dispense Instructions for use Diagnosis    BENADRYL ALLERGY 25 MG tablet   Generic drug:  diphenhydrAMINE     56 tablet    Take 25 mg by mouth At Bedtime        chlorpheniramine 4 MG tablet    CHLOR-TRIMETON     Take 4 mg by mouth every 6 hours as needed. For head cold        COMPRESSION STOCKINGS     2 each    Wear compression stockings at 20-30 mmHg rating most time during the day to the affected leg (left leg) or both legs. Take them off at night.    DVT (deep venous thrombosis), left, Postphlebitic syndrome       * cycloSPORINE modified 25 MG capsule    GENERIC EQUIVALENT    540 capsule    Take 1 capsule (25 mg) by mouth 2 times daily Or as directed    Aplastic anemia (H)       * cycloSPORINE modified 100 MG capsule    GENERIC EQUIVALENT    120 capsule    Take 1 capsule (100 mg) by mouth 2 times daily Reported on 5/4/2017    Gastroesophageal reflux disease without esophagitis, Left-sided low back pain without sciatica, unspecified chronicity, Constipation, unspecified constipation type, Hemorrhoids, unspecified hemorrhoid type       diclofenac 1 % Gel topical  gel    VOLTAREN    100 g    Apply 2 g topically 2 times daily    Myofascial pain       eltrombopag 50 MG tablet    PROMACTA    90 tablet    Take 3 tablets (150 mg) by mouth daily Administer on an empty stomach, 1 hour before or 2 hours after a meal. Or as directed    Idiopathic aplastic anemia (H), Pancytopenia (H)       fluticasone 50 MCG/ACT spray    FLONASE    1 Bottle    Spray 1-2 sprays into both nostrils daily    Rhinitis medicamentosa, Bilateral impacted cerumen, Nasal congestion       loratadine 10 MG tablet    CLARITIN     Take 10 mg by mouth daily as needed Reported on 4/26/2017        order for DME     1 Box    Equipment being ordered: knee high compression stockings- 18-20 mm    DVT prophylaxis       oxyCODONE 5 MG IR tablet    ROXICODONE    50 tablet    Take 1 tablet (5 mg) by mouth every 6 hours as needed for moderate to severe pain    Left-sided low back pain without sciatica, unspecified chronicity       pantoprazole 40 MG EC tablet    PROTONIX     Take 1 tablet (40 mg) by mouth daily    Gastroesophageal reflux disease without esophagitis, Left-sided low back pain without sciatica, unspecified chronicity, Constipation, unspecified constipation type, Hemorrhoids, unspecified hemorrhoid type, Idiopathic aplastic anemia (H), Pancytopenia (H)       TYLENOL PO      Take 325 mg by mouth every 6 hours as needed for mild pain or fever        * Notice:  This list has 2 medication(s) that are the same as other medications prescribed for you. Read the directions carefully, and ask your doctor or other care provider to review them with you.

## 2017-10-12 NOTE — NURSING NOTE
VPT for lab collection by RN in patients [place], pt tolerated well. VS taken and pt checked in for next appt. Jes Virk

## 2017-10-12 NOTE — LETTER
10/12/2017      RE: Bonny Raymundo  [address]  [facility] [address]       /82  Pulse 86  Temp 98.4  F (36.9  C) (Oral)  Resp 18  Wt 52.3 kg (115 lb 6.4 oz)  SpO2 96%  BMI 21.1 kg/m2  Wt Readings from Last 4 Encounters:   10/12/17 52.3 kg (115 lb 6.4 oz)   09/20/17 52.1 kg (114 lb 14.4 oz)   08/17/17 53.3 kg (117 lb 8 oz)   08/02/17 53.3 kg (117 lb 8 oz)     Bonny returns to follow-up her aplastic anemia. She's now been on therapy for the last 10 months and is had substantial improvement to a less severe Aplasia and she has not needed transfusions in 6 or more months. In the last few weeks. She's felt better with a bit more energy and with a chiropractor, kinesiology tapes and physical therapy, her back and right anterior ribs are not as sore. She's had no recent fevers, infections or bleeding.    Today she took her cyclosporine, so her level will be erroneously high.    She has a sore tooth that likely needs removal and an implant. I emphasized that she'll need antibiotics and the oral surgeon needs to know that her platelet count is low. She was given copies of her recent blood counts today to show the oral surgeon.    The rest of her review of systems is unrevealing.    Her vital signs are acceptable and her weight is stable for the last few months. She has no petechiae in her oropharynx. Conjunctiva or ankles. There is no skin rash. Her oral mucosa is not inflamed. Her lungs are clear. Heart tones are regular. There is no gallop rhythm. Her abdomen is nontender without palpable masses or hepatosplenomegaly. She has no peripheral edema, or bone tenderness.    Her blood counts are similar. There are white count to bit lower today with neutrophil count of 900 platelets of 38,000. I told her that would be better if it was higher, but is not cause for alarm and we will recheck in a few weeks. She assures me that she still taking her cyclosporine and eltrombopag daily as directed.    She'll  return in 4 weeks for recheck, but knows to call if additional issues develop.     Rafita Rogel M.D.   Professor of medicine    Results for LISSETH PARIKH (MRN [id number]) as of 10/12/2017 17:15   Ref. Range 10/12/2017 16:10   Sodium Latest Ref Range: 133 - 144 mmol/L 138   Potassium Latest Ref Range: 3.4 - 5.3 mmol/L 4.3   Chloride Latest Ref Range: 94 - 109 mmol/L 107   Carbon Dioxide Latest Ref Range: 20 - 32 mmol/L 25   Urea Nitrogen Latest Ref Range: 7 - 30 mg/dL 35 (H)   Creatinine Latest Ref Range: 0.52 - 1.04 mg/dL 1.34 (H)   GFR Estimate Latest Ref Range: >60 mL/min/1.7m2 39 (L)   GFR Estimate If Black Latest Ref Range: >60 mL/min/1.7m2 47 (L)   Calcium Latest Ref Range: 8.5 - 10.1 mg/dL 8.7   Anion Gap Latest Ref Range: 3 - 14 mmol/L 6   Albumin Latest Ref Range: 3.4 - 5.0 g/dL 3.4   Protein Total Latest Ref Range: 6.8 - 8.8 g/dL 7.1   Bilirubin Total Latest Ref Range: 0.2 - 1.3 mg/dL 1.7 (H)   Alkaline Phosphatase Latest Ref Range: 40 - 150 U/L 72   ALT Latest Ref Range: 0 - 50 U/L 20   AST Latest Ref Range: 0 - 45 U/L 13   Glucose Latest Ref Range: 70 - 99 mg/dL 103 (H)   WBC Latest Ref Range: 4.0 - 11.0 10e9/L 2.7 (L)   Hemoglobin Latest Ref Range: 11.7 - 15.7 g/dL 8.4 (L)   Hematocrit Latest Ref Range: 35.0 - 47.0 % 24.9 (L)   Platelet Count Latest Ref Range: 150 - 450 10e9/L 38 (LL)   RBC Count Latest Ref Range: 3.8 - 5.2 10e12/L 2.09 (L)   MCV Latest Ref Range: 78 - 100 fl 119 (H)   MCH Latest Ref Range: 26.5 - 33.0 pg 40.2 (H)   MCHC Latest Ref Range: 31.5 - 36.5 g/dL 33.7   RDW Latest Ref Range: 10.0 - 15.0 % 14.1   Diff Method Unknown Automated Method   % Neutrophils Latest Units: % 32.7   % Lymphocytes Latest Units: % 57.4   % Monocytes Latest Units: % 8.1   % Eosinophils Latest Units: % 1.8   % Basophils Latest Units: % 0.0   % Immature Granulocytes Latest Units: % 0.0   Nucleated RBCs Latest Ref Range: 0 /100 0   Absolute Neutrophil Latest Ref Range: 1.6 - 8.3 10e9/L 0.9 (L)   Absolute  Lymphocytes Latest Ref Range: 0.8 - 5.3 10e9/L 1.6   Absolute Monocytes Latest Ref Range: 0.0 - 1.3 10e9/L 0.2   Absolute Eosinophils Latest Ref Range: 0.0 - 0.7 10e9/L 0.1   Absolute Basophils Latest Ref Range: 0.0 - 0.2 10e9/L 0.0   Abs Immature Granulocytes Latest Ref Range: 0 - 0.4 10e9/L 0.0   Absolute Nucleated RBC Unknown 0.0       Rafita Rogel MD

## 2017-11-15 ENCOUNTER — ONCOLOGY VISIT (OUTPATIENT)
Dept: ONCOLOGY | Facility: CLINIC | Age: 72
End: 2017-11-15
Attending: PHYSICIAN ASSISTANT
Payer: COMMERCIAL

## 2017-11-15 ENCOUNTER — APPOINTMENT (OUTPATIENT)
Dept: LAB | Facility: CLINIC | Age: 72
End: 2017-11-15
Attending: INTERNAL MEDICINE
Payer: COMMERCIAL

## 2017-11-15 ENCOUNTER — OFFICE VISIT (OUTPATIENT)
Dept: AUDIOLOGY | Facility: CLINIC | Age: 72
End: 2017-11-15

## 2017-11-15 VITALS
HEART RATE: 84 BPM | BODY MASS INDEX: 21.18 KG/M2 | SYSTOLIC BLOOD PRESSURE: 127 MMHG | DIASTOLIC BLOOD PRESSURE: 83 MMHG | HEIGHT: 62 IN | OXYGEN SATURATION: 97 % | TEMPERATURE: 98 F | RESPIRATION RATE: 18 BRPM | WEIGHT: 115.1 LBS

## 2017-11-15 DIAGNOSIS — H90.6 MIXED HEARING LOSS, BILATERAL: Primary | ICD-10-CM

## 2017-11-15 DIAGNOSIS — K21.9 GASTROESOPHAGEAL REFLUX DISEASE WITHOUT ESOPHAGITIS: ICD-10-CM

## 2017-11-15 DIAGNOSIS — K64.9 HEMORRHOIDS, UNSPECIFIED HEMORRHOID TYPE: ICD-10-CM

## 2017-11-15 DIAGNOSIS — D61.818 PANCYTOPENIA (H): ICD-10-CM

## 2017-11-15 DIAGNOSIS — M54.50 LEFT-SIDED LOW BACK PAIN WITHOUT SCIATICA, UNSPECIFIED CHRONICITY: ICD-10-CM

## 2017-11-15 DIAGNOSIS — J30.1 SEASONAL ALLERGIC RHINITIS DUE TO POLLEN: ICD-10-CM

## 2017-11-15 DIAGNOSIS — D61.3 IDIOPATHIC APLASTIC ANEMIA (H): ICD-10-CM

## 2017-11-15 DIAGNOSIS — K59.00 CONSTIPATION, UNSPECIFIED CONSTIPATION TYPE: ICD-10-CM

## 2017-11-15 LAB
ABO + RH BLD: NORMAL
ABO + RH BLD: NORMAL
ALBUMIN SERPL-MCNC: 3.4 G/DL (ref 3.4–5)
ALP SERPL-CCNC: 79 U/L (ref 40–150)
ALT SERPL W P-5'-P-CCNC: 22 U/L (ref 0–50)
ANION GAP SERPL CALCULATED.3IONS-SCNC: 7 MMOL/L (ref 3–14)
AST SERPL W P-5'-P-CCNC: 14 U/L (ref 0–45)
BASOPHILS # BLD AUTO: 0 10E9/L (ref 0–0.2)
BASOPHILS NFR BLD AUTO: 0.4 %
BILIRUB SERPL-MCNC: 1.5 MG/DL (ref 0.2–1.3)
BLD GP AB SCN SERPL QL: NORMAL
BLOOD BANK CMNT PATIENT-IMP: NORMAL
BUN SERPL-MCNC: 39 MG/DL (ref 7–30)
CALCIUM SERPL-MCNC: 8.6 MG/DL (ref 8.5–10.1)
CHLORIDE SERPL-SCNC: 108 MMOL/L (ref 94–109)
CO2 SERPL-SCNC: 23 MMOL/L (ref 20–32)
CREAT SERPL-MCNC: 1.38 MG/DL (ref 0.52–1.04)
CYCLOSPORINE BLD LC/MS/MS-MCNC: 174 UG/L (ref 50–400)
DIFFERENTIAL METHOD BLD: ABNORMAL
EOSINOPHIL # BLD AUTO: 0.1 10E9/L (ref 0–0.7)
EOSINOPHIL NFR BLD AUTO: 2.1 %
ERYTHROCYTE [DISTWIDTH] IN BLOOD BY AUTOMATED COUNT: 14.1 % (ref 10–15)
GFR SERPL CREATININE-BSD FRML MDRD: 37 ML/MIN/1.7M2
GLUCOSE SERPL-MCNC: 88 MG/DL (ref 70–99)
HCT VFR BLD AUTO: 25.7 % (ref 35–47)
HGB BLD-MCNC: 8.4 G/DL (ref 11.7–15.7)
IMM GRANULOCYTES # BLD: 0 10E9/L (ref 0–0.4)
IMM GRANULOCYTES NFR BLD: 0.4 %
LYMPHOCYTES # BLD AUTO: 1.1 10E9/L (ref 0.8–5.3)
LYMPHOCYTES NFR BLD AUTO: 39.5 %
MCH RBC QN AUTO: 39.6 PG (ref 26.5–33)
MCHC RBC AUTO-ENTMCNC: 32.7 G/DL (ref 31.5–36.5)
MCV RBC AUTO: 121 FL (ref 78–100)
MONOCYTES # BLD AUTO: 0.2 10E9/L (ref 0–1.3)
MONOCYTES NFR BLD AUTO: 8.5 %
NEUTROPHILS # BLD AUTO: 1.4 10E9/L (ref 1.6–8.3)
NEUTROPHILS NFR BLD AUTO: 49.1 %
NRBC # BLD AUTO: 0 10*3/UL
NRBC BLD AUTO-RTO: 0 /100
PLATELET # BLD AUTO: 49 10E9/L (ref 150–450)
POTASSIUM SERPL-SCNC: 4 MMOL/L (ref 3.4–5.3)
PROT SERPL-MCNC: 7.3 G/DL (ref 6.8–8.8)
RBC # BLD AUTO: 2.12 10E12/L (ref 3.8–5.2)
RETICS # AUTO: 46.9 10E9/L (ref 25–95)
RETICS/RBC NFR AUTO: 2.2 % (ref 0.5–2)
SODIUM SERPL-SCNC: 139 MMOL/L (ref 133–144)
SPECIMEN EXP DATE BLD: NORMAL
TME LAST DOSE: NORMAL H
WBC # BLD AUTO: 2.8 10E9/L (ref 4–11)

## 2017-11-15 PROCEDURE — 80048 BASIC METABOLIC PNL TOTAL CA: CPT | Performed by: INTERNAL MEDICINE

## 2017-11-15 PROCEDURE — 80053 COMPREHEN METABOLIC PANEL: CPT | Performed by: INTERNAL MEDICINE

## 2017-11-15 PROCEDURE — 99214 OFFICE O/P EST MOD 30 MIN: CPT | Mod: ZP | Performed by: PHYSICIAN ASSISTANT

## 2017-11-15 PROCEDURE — 25000128 H RX IP 250 OP 636: Mod: ZF | Performed by: INTERNAL MEDICINE

## 2017-11-15 PROCEDURE — G0008 ADMIN INFLUENZA VIRUS VAC: HCPCS

## 2017-11-15 PROCEDURE — 86900 BLOOD TYPING SEROLOGIC ABO: CPT | Performed by: PHYSICIAN ASSISTANT

## 2017-11-15 PROCEDURE — 85045 AUTOMATED RETICULOCYTE COUNT: CPT | Performed by: INTERNAL MEDICINE

## 2017-11-15 PROCEDURE — 36415 COLL VENOUS BLD VENIPUNCTURE: CPT

## 2017-11-15 PROCEDURE — 86850 RBC ANTIBODY SCREEN: CPT | Performed by: PHYSICIAN ASSISTANT

## 2017-11-15 PROCEDURE — 99212 OFFICE O/P EST SF 10 MIN: CPT | Mod: ZF

## 2017-11-15 PROCEDURE — 80158 DRUG ASSAY CYCLOSPORINE: CPT | Performed by: INTERNAL MEDICINE

## 2017-11-15 PROCEDURE — 85025 COMPLETE CBC W/AUTO DIFF WBC: CPT | Performed by: INTERNAL MEDICINE

## 2017-11-15 PROCEDURE — 86901 BLOOD TYPING SEROLOGIC RH(D): CPT | Performed by: PHYSICIAN ASSISTANT

## 2017-11-15 PROCEDURE — 90662 IIV NO PRSV INCREASED AG IM: CPT | Mod: ZF | Performed by: INTERNAL MEDICINE

## 2017-11-15 RX ADMIN — INFLUENZA A VIRUSA/MICHIGAN/45/2015 X-275 (H1N1) ANTIGEN (FORMALDEHYDE INACTIVATED), INFLUENZA A VIRUS A/HONG KONG/4801/2014 X-263B (H3N2) ANTIGEN (FORMALDEHYDE INACTIVATED), AND INFLUENZA B VIRUS B/BRISBANE/60/2008 ANTIGEN (FORMALDEHYDE INACTIVATED) 0.5 ML: 60; 60; 60 INJECTION, SUSPENSION INTRAMUSCULAR at 11:41

## 2017-11-15 ASSESSMENT — PAIN SCALES - GENERAL: PAINLEVEL: MILD PAIN (2)

## 2017-11-15 NOTE — MR AVS SNAPSHOT
After Visit Summary   11/15/2017    Bonny Raymundo    MRN: [id number]           Patient Information     Date Of Birth          1945        Visit Information        Provider Department      11/15/2017 1:00 PM Jaqui Chan AuD M [facility]         Follow-ups after your visit        Your next 10 appointments already scheduled     Dec 14, 2017  4:00 PM CST   [facility] Draw with [facility]   [facility] Lab Draw ([facility])    [address] [place] [address]   [phone]            Dec 14, 2017  4:30 PM CST   RETURN ONC with Rafita Rogel MD   [facility] Blood and Marrow Transplant ([facility])    [address] [place] [address]   [phone]            Benton 10, 2018  9:45 AM CST   [facility] Draw with [facility]   [facility] Lab Draw ([facility])    [address] [place] [address]   [phone]            Benton 10, 2018 10:20 AM CST   (Arrive by 10:05 AM)   Return Visit with Celine Walls PA-C   [facility] ([facility])    [address] [place] [address]   [phone]            Feb 08, 2018  1:00 PM CST   Hearing Aid Consult with Kamialh Grey [facility] ([facility])    [address] [place] [address]   [phone]            Feb 08, 2018  2:30 PM CST   [facility] Draw with [facility]   [facility] Lab Draw ([facility])    [address] [place] [address]   [phone]            Feb 08, 2018  3:00 PM CST   RETURN ONC with Rafita Rogel MD   [facility] Blood and Marrow Transplant ([facility])    [address] [place] [address]  [address]   [phone]            Mar 01, 2018  1:00 PM CST   Hearing Aid Fitting with Kamilah Grey [facility]iology ([facility])    [address] [place] [address]   [phone]            2018  1:00 PM CDT   (Arrive by 12:45 PM)   Initial Review Program with Kamilah Grey [facility]iology ([facility])    [address] [place] [address]   [phone]              Who to contact     Please call your clinic at [phone] to:    Ask questions about your health    Make or cancel appointments    Discuss your medicines    Learn about your test results    Speak to your doctor   If you have compliments or concerns about an experience at your clinic, or if you wish to file a complaint, please contact [facility] Patient Relations at [phone] or email us at [email or web address]         Additional Information About Your Visit        [facility] Information     [facility] is an electronic gateway that provides easy, online access to your medical records. With [facility], you can request a clinic appointment, read your test results, renew a prescription or communicate with your care team.     To sign up for [facility] visit the website at [email or web address]   You will be asked to enter the access code listed below, as well as some personal information. Please follow the directions to create your username and password.     Your access code is: [email or web address]  Expires: 2017  9:51 AM     Your access code will  in 90 days. If you need help or a new code, please contact your [facility] or call [phone] for assistance.        Care EveryWhere ID     This is your Care EveryWhere ID. This could be used by other organizations to access your [place] medical records  [id number]         Blood Pressure from Last 3  Encounters:   11/15/17 127/83   10/12/17 137/82   09/20/17 134/75    Weight from Last 3 Encounters:   11/15/17 52.2 kg (115 lb 1.6 oz)   10/12/17 52.3 kg (115 lb 6.4 oz)   09/20/17 52.1 kg (114 lb 14.4 oz)              Today, you had the following     No orders found for display         Today's Medication Changes          These changes are accurate as of: 11/15/17  1:25 PM.  If you have any questions, ask your nurse or doctor.               These medicines have changed or have updated prescriptions.        Dose/Directions    fluticasone 50 MCG/ACT spray   Commonly known as:  FLONASE   This may have changed:    - when to take this  - reasons to take this   Used for:  Rhinitis medicamentosa, Bilateral impacted cerumen, Nasal congestion        Dose:  1-2 spray   Spray 1-2 sprays into both nostrils daily   Quantity:  1 Bottle   Refills:  11                Primary Care Provider Office Phone # Fax #    Shivani Phelps PA-C [phone] [address]  [facility] [address]        Equal Access to Services     [facility] AH: Hadii erica Hines, waaxda lutrini, qaybta kaalmada denzel, ambika strong . So [facility] [phone].    ATENCIÓN: Si habla español, tiene a robertson disposición servicios gratuitos de asistencia lingüística. Llame al [phone].    We comply with applicable federal civil rights laws and [place] laws. We do not discriminate on the basis of race, color, national origin, age, disability, sex, sexual orientation, or gender identity.            Thank you!     Thank you for choosing [facility]UDIOLOGY  for your care. Our goal is always to provide you with excellent care. Hearing back from our patients is one way we can continue to improve our services. Please take a few minutes to complete the written survey that you may receive in the mail after your visit with us. Thank you!             Your Updated Medication List - Protect others around you:  Learn how to safely use, store and throw away your medicines at www.disposemymeds.org.          This list is accurate as of: 11/15/17  1:25 PM.  Always use your most recent med list.                   Brand Name Dispense Instructions for use Diagnosis    BENADRYL ALLERGY 25 MG tablet   Generic drug:  diphenhydrAMINE     56 tablet    Take 25 mg by mouth At Bedtime        chlorpheniramine 4 MG tablet    CHLOR-TRIMETON     Take 4 mg by mouth every 6 hours as needed. For head cold        COMPRESSION STOCKINGS     2 each    Wear compression stockings at 20-30 mmHg rating most time during the day to the affected leg (left leg) or both legs. Take them off at night.    DVT (deep venous thrombosis), left, Postphlebitic syndrome       * cycloSPORINE modified 25 MG capsule    GENERIC EQUIVALENT    540 capsule    Take 1 capsule (25 mg) by mouth 2 times daily Or as directed    Aplastic anemia (H)       * cycloSPORINE modified 100 MG capsule    GENERIC EQUIVALENT    120 capsule    Take 1 capsule (100 mg) by mouth 2 times daily Reported on 5/4/2017    Gastroesophageal reflux disease without esophagitis, Left-sided low back pain without sciatica, unspecified chronicity, Constipation, unspecified constipation type, Hemorrhoids, unspecified hemorrhoid type       diclofenac 1 % Gel topical gel    VOLTAREN    100 g    Apply 2 g topically 2 times daily    Myofascial pain       eltrombopag 50 MG tablet    PROMACTA    90 tablet    Take 3 tablets (150 mg) by mouth daily Administer on an empty stomach, 1 hour before or 2 hours after a meal. Or as directed    Idiopathic aplastic anemia (H), Pancytopenia (H)       fluticasone 50 MCG/ACT spray    FLONASE    1 Bottle    Spray 1-2 sprays into both nostrils daily    Rhinitis medicamentosa, Bilateral impacted cerumen, Nasal congestion       loratadine 10 MG tablet    CLARITIN     Take 10 mg by mouth daily as needed Reported on 4/26/2017        order for DME     1 Box    Equipment being ordered:  knee high compression stockings- 18-20 mm    DVT prophylaxis       oxyCODONE IR 5 MG tablet    ROXICODONE    50 tablet    Take 1 tablet (5 mg) by mouth every 6 hours as needed for moderate to severe pain    Left-sided low back pain without sciatica, unspecified chronicity       pantoprazole 40 MG EC tablet    PROTONIX     Take 1 tablet (40 mg) by mouth daily    Gastroesophageal reflux disease without esophagitis, Left-sided low back pain without sciatica, unspecified chronicity, Constipation, unspecified constipation type, Hemorrhoids, unspecified hemorrhoid type, Idiopathic aplastic anemia (H), Pancytopenia (H)       TYLENOL PO      Take 325 mg by mouth every 6 hours as needed for mild pain or fever        * Notice:  This list has 2 medication(s) that are the same as other medications prescribed for you. Read the directions carefully, and ask your doctor or other care provider to review them with you.

## 2017-11-15 NOTE — NURSING NOTE
"Oncology Rooming Note    November 15, 2017 10:31 AM   Bonny Raymundo is a 71 year old female who presents for:    Chief Complaint   Patient presents with     Oncology Clinic Visit     Aplastic Anemia, 1 Mo F/U     Blood Draw     VENIPUNCTURE LABS COLLECTED BY RN.      Initial Vitals: /83 (BP Location: Right arm, Patient Position: Sitting)  Pulse 84  Temp 98  F (36.7  C) (Oral)  Resp 18  Ht 1.575 m (5' 2.01\")  Wt 52.2 kg (115 lb 1.6 oz)  SpO2 97%  BMI 21.05 kg/m2 Estimated body mass index is 21.05 kg/(m^2) as calculated from the following:    Height as of this encounter: 1.575 m (5' 2.01\").    Weight as of this encounter: 52.2 kg (115 lb 1.6 oz). Body surface area is 1.51 meters squared.  Mild Pain (2) Comment: Data Unavailable   No LMP recorded. Patient has had a hysterectomy.  Allergies reviewed: Yes  Medications reviewed: Yes    Medications: Medication refills not needed today.  Pharmacy name entered into [facility]:    [facility] [place], [place] - [address]  [facility] [place], [place] - [address]  [facility] [place], [place] - [address] [place] [address]. [address]  WRITTEN PRESCRIPTION REQUESTED    Clinical concerns: Pt declined to review medications stating they are all up to date. Taisha Walls was NOT notified.    7 minutes for nursing intake (face to face time)     Clarice Munoz LPN              "

## 2017-11-15 NOTE — NURSING NOTE
Patient presents to influenza program requesting influenza vaccination.  Standing orders implemented. Given IM in pt left deltoid. Pt tolerated procedure well.    Vaccination given by MYRNA BARRIOS LPN

## 2017-11-15 NOTE — MR AVS SNAPSHOT
After Visit Summary   11/15/2017    Bonny Raymundo    MRN: [id number]           Patient Information     Date Of Birth          1945        Visit Information        Provider Department      11/15/2017 10:20 AM Celine Walls PA-C [facility]        Today's Diagnoses     Idiopathic aplastic anemia (H)        Seasonal allergic rhinitis due to pollen        Pancytopenia (H)        Gastroesophageal reflux disease without esophagitis        Left-sided low back pain without sciatica, unspecified chronicity        Constipation, unspecified constipation type        Hemorrhoids, unspecified hemorrhoid type           Follow-ups after your visit        Your next 10 appointments already scheduled     Nov 15, 2017  1:00 PM CST   (Arrive by 12:45 PM)   Hearing Evaluation with Kamilah Grey   [facility] ([facility])    [address] [place] [address]  [facility] [id number] [phone]           Please see your medical professional for ear cleaning prior to this appointment if you believe wax buildup may be an issue. All patients are required to have a physician's order stating the medical reason for the hearing test. Your doctor can send an electronic order, use their own form or we have provided a form (called Physician's Order for Audiology Services). It states that there is a medical reason for your exam. Without an order you may need to be rescheduled until the order can be obtained.            Dec 14, 2017  4:00 PM CST   [facility] Draw with  [facility] DRAW   [facility] Lab Draw ([facility])    [address] [place] [id number] [phone]            Dec 14, 2017  4:30 PM CST   RETURN ONC with Rafita Rogel MD   [facility] Blood and Marrow Transplant ([facility])    [address] [place] [address] [id number] [phone]  "           Benton 10, 2018  9:45 AM CST   [facility] Lab Draw with  [facility] LAB DRAW   [facility] Draw ([facility])    [address] [id number] [phone]            Benton 10, 2018 10:20 AM CST   (Arrive by 10:05 AM)   Return Visit with Celine Walls PA-C   [facility] ([facility])    [address] [place] [address] [id number] [phone]            Feb 08, 2018  2:30 PM CST   [facility] Lab Draw with  [facility] LAB DRAW   [facility] Draw ([facility])    [address] [id number] [phone]            Feb 08, 2018  3:00 PM CST   RETURN ONC with Rafita Rogel MD   [facility] Blood and Marrow Transplant ([facility])    [address] [place] [address] [id number] [phone]              Who to contact     If you have questions or need follow up information about today's clinic visit or your schedule please contact [facility] directly at [phone].  Normal or non-critical lab and imaging results will be communicated to you by MyChart, letter or phone within 4 business days after the clinic has received the results. If you do not hear from us within 7 days, please contact the clinic through MyChart or phone. If you have a critical or abnormal lab result, we will notify you by phone as soon as possible.  Submit refill requests through [employer] or call your pharmacy and they will forward the refill request to us. Please allow 3 business days for your refill to be completed.          Additional Information About Your Visit        All[employer]ers Information     [employer] lets you send messages to your doctor, view your test results, renew your prescriptions, schedule appointments and more. To sign up, go to [email or web address] . Click on \"Log in\" on the " "left side of the screen, which will take you to the Welcome page. Then click on \"Sign up Now\" on the right side of the page.     You will be asked to enter the access code listed below, as well as some personal information. Please follow the directions to create your username and password.     Your access code is: [email or web address]  Expires: 2017  9:51 AM     Your access code will  in 90 days. If you need help or a new code, please call your [place] clinic or [phone].        Care EveryWhere ID     This is your Care EveryWhere ID. This could be used by other organizations to access your [place] medical records  [id number]        Your Vitals Were     Pulse Temperature Respirations Height Pulse Oximetry BMI (Body Mass Index)    84 98  F (36.7  C) (Oral) 18 1.575 m (5' 2.01\") 97% 21.05 kg/m2       Blood Pressure from Last 3 Encounters:   11/15/17 127/83   10/12/17 137/82   17 134/75    Weight from Last 3 Encounters:   11/15/17 52.2 kg (115 lb 1.6 oz)   10/12/17 52.3 kg (115 lb 6.4 oz)   17 52.1 kg (114 lb 14.4 oz)              We Performed the Following     ABO/Rh type and screen     CBC with platelets differential     Comprehensive metabolic panel     Cyclosporine     Reticulocyte count          Today's Medication Changes          These changes are accurate as of: 11/15/17 11:21 AM.  If you have any questions, ask your nurse or doctor.               These medicines have changed or have updated prescriptions.        Dose/Directions    fluticasone 50 MCG/ACT spray   Commonly known as:  FLONASE   This may have changed:    - when to take this  - reasons to take this   Used for:  Rhinitis medicamentosa, Bilateral impacted cerumen, Nasal congestion        Dose:  1-2 spray   Spray 1-2 sprays into both nostrils daily   Quantity:  1 Bottle   Refills:  11                Primary Care Provider Office Phone # Fax #    Shivani Phelps PA-C [phone] [address]  [place] " [place] [address]        Equal Access to Services     [facility]: Hadii aad brandyn jeanna Hines, wamarioda luqjohnny, qaybta kaalmada danajuanalandy, waxharesh berenice garyshavoneric levine. So [facility] [phone].    ATENCIÓN: Si habla español, tiene a robertson disposición servicios gratuitos de asistencia lingüística. Maria Isabelame al [phone].    We comply with applicable federal civil rights laws and [place] laws. We do not discriminate on the basis of race, color, national origin, age, disability, sex, sexual orientation, or gender identity.            Thank you!     Thank you for choosing [facility]  for your care. Our goal is always to provide you with excellent care. Hearing back from our patients is one way we can continue to improve our services. Please take a few minutes to complete the written survey that you may receive in the mail after your visit with us. Thank you!             Your Updated Medication List - Protect others around you: Learn how to safely use, store and throw away your medicines at www.disposemymeds.org.          This list is accurate as of: 11/15/17 11:21 AM.  Always use your most recent med list.                   Brand Name Dispense Instructions for use Diagnosis    BENADRYL ALLERGY 25 MG tablet   Generic drug:  diphenhydrAMINE     56 tablet    Take 25 mg by mouth At Bedtime        chlorpheniramine 4 MG tablet    CHLOR-TRIMETON     Take 4 mg by mouth every 6 hours as needed. For head cold        COMPRESSION STOCKINGS     2 each    Wear compression stockings at 20-30 mmHg rating most time during the day to the affected leg (left leg) or both legs. Take them off at night.    DVT (deep venous thrombosis), left, Postphlebitic syndrome       * cycloSPORINE modified 25 MG capsule    GENERIC EQUIVALENT    540 capsule    Take 1 capsule (25 mg) by mouth 2 times daily Or as directed    Aplastic anemia (H)       * cycloSPORINE modified 100 MG capsule    GENERIC EQUIVALENT    120 capsule    Take 1  capsule (100 mg) by mouth 2 times daily Reported on 5/4/2017    Gastroesophageal reflux disease without esophagitis, Left-sided low back pain without sciatica, unspecified chronicity, Constipation, unspecified constipation type, Hemorrhoids, unspecified hemorrhoid type       diclofenac 1 % Gel topical gel    VOLTAREN    100 g    Apply 2 g topically 2 times daily    Myofascial pain       eltrombopag 50 MG tablet    PROMACTA    90 tablet    Take 3 tablets (150 mg) by mouth daily Administer on an empty stomach, 1 hour before or 2 hours after a meal. Or as directed    Idiopathic aplastic anemia (H), Pancytopenia (H)       fluticasone 50 MCG/ACT spray    FLONASE    1 Bottle    Spray 1-2 sprays into both nostrils daily    Rhinitis medicamentosa, Bilateral impacted cerumen, Nasal congestion       loratadine 10 MG tablet    CLARITIN     Take 10 mg by mouth daily as needed Reported on 4/26/2017        order for DME     1 Box    Equipment being ordered: knee high compression stockings- 18-20 mm    DVT prophylaxis       oxyCODONE IR 5 MG tablet    ROXICODONE    50 tablet    Take 1 tablet (5 mg) by mouth every 6 hours as needed for moderate to severe pain    Left-sided low back pain without sciatica, unspecified chronicity       pantoprazole 40 MG EC tablet    PROTONIX     Take 1 tablet (40 mg) by mouth daily    Gastroesophageal reflux disease without esophagitis, Left-sided low back pain without sciatica, unspecified chronicity, Constipation, unspecified constipation type, Hemorrhoids, unspecified hemorrhoid type, Idiopathic aplastic anemia (H), Pancytopenia (H)       TYLENOL PO      Take 325 mg by mouth every 6 hours as needed for mild pain or fever        * Notice:  This list has 2 medication(s) that are the same as other medications prescribed for you. Read the directions carefully, and ask your doctor or other care provider to review them with you.

## 2017-11-15 NOTE — NURSING NOTE
Bonny Raymundo      1.  Has the patient received the information for the influenza vaccine? YES    2.  Does the patient have any of the following contraindications?     Allergy to eggs?  No     Allergic reaction to previous influenza vaccines?  No     Any other problems to previous influenza vaccines?  No     Paralyzed by Guillain-[place] syndrome?  No     Currently pregnant? NO     Current moderate or severe illness?  No     Allergy to contact lens solution? No    3.  The vaccine has been administered in the usual fashion and the patient was instructed to wait 20 minutes before leaving the building in the event of an allergic reaction: NO    Recorded by MYRNA BARRIOS

## 2017-11-15 NOTE — NURSING NOTE
Chief Complaint   Patient presents with     Oncology Clinic Visit     Aplastic Anemia, 1 Mo F/U     Blood Draw     VENIPUNCTURE LABS COLLECTED BY RN.

## 2017-11-15 NOTE — PROGRESS NOTES
[facility]  FOLLOW-UP VISIT NOTE  Date of visit:   Nov 15, 2017          REASON FOR VISIT: Aplastic anemia, routine 4 week follow up    HPI: Bonny is a 71 year old female who presented in the fall of 2016 with fatigue and shortness of breath. 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  Her counts showed pancytopenia and BMBX was concerning for aplastic anemia.  By December of 2016 she was transfusion dependent with platelets under 10 k and ANC dropped under 500. 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  She has met with Dr Mcdaniel at [facility] and consulted with Dr Rogel at [facility].      After further consultation it was decided to admit on 1/9/17 for ATG/cyclosporine/prednisone therapy.     The following was her inpatient regimen:  Day 1= 1/9/17  ATG 2500 mg (40 mg/kg0 q24 hours D-4)  Cyclosporine 200 mg (3 mg/kg) PO bid  Eltrombopag 50 mg daily  Methylpred 31 mg (0.5 mg/kg) q24 hours D1-4  Prednisone 60 mg (1 mg/kg) daily after completion of IV methylpred to continue for at least 2 weeks    Bonny also had a admission 1/23-1/25/17 for rectal bleeding (presumed hemorrhoidal). Otherwise has done well with no other complications requiring hospitalization.     INTERVAL HISTORY: Bonny is here today for her q4w follow up. Bonny continues to make some good strides of improvement.  Her unsteadiness, back pain and stamina are better with PT- she is using kinesiology tape and a soft binder which both seem to really help her back pain. She has completed PT with attaining her goals of improved ambulation, she is really glad she did PT.  She feels her dysphagia is better and that she is eating more- weight has been stable.     No fevers, chest pain and and no bleeding. Daily BM, no black/blood. No  issues. She has a tremor that is mild.  Her hearing is still an issue- she is meeting with ENT to discuss hearing aides.     ROS:  "complete review of systems was performed which was negative unless noted above.    EXAM:  /83 (BP Location: Right arm, Patient Position: Sitting)  Pulse 84  Temp 98  F (36.7  C) (Oral)  Resp 18  Ht 1.575 m (5' 2.01\")  Wt 52.2 kg (115 lb 1.6 oz)  SpO2 97%  BMI 21.05 kg/m2  Wt Readings from Last 4 Encounters:   11/15/17 52.2 kg (115 lb 1.6 oz)   10/12/17 52.3 kg (115 lb 6.4 oz)   09/20/17 52.1 kg (114 lb 14.4 oz)   08/17/17 53.3 kg (117 lb 8 oz)     General: Patient alert and oriented x3.  She looks better/stronger better skin coloring than I have seen her in a while.   EYES: PERRLA, conjunctiva pink, sclera is jaundice.   Neck: No palpable cervical, supraclavicular or axillary nodes bilaterally.   Cardiovascular: RRR, no murmurs, gallops or rubs  Respiratory: clear to auscultation bilaterally;  no crackles, rhonchi or wheezing.   Abdomen: positive bowel sounds in all four quadrants, soft, nontender  Skin: light jaundice, intact  Neuro: grossly intact.   Mood and affect is stable.       LABS:      9/20/2017 09:04 10/12/2017 16:10 11/15/2017 10:22   WBC 3.6 (L) 2.7 (L) 2.8 (L) ANC 1.4    Hemoglobin 8.6 (L) 8.4 (L) 8.4 (L)   Hematocrit 25.7 (L) 24.9 (L) 25.7 (L)   Platelet Count 46 (LL) 38 (LL) 49 (LL)   RBC Count 2.16 (L) 2.09 (L) 2.12 (L)    (H) 119 (H) 121 (H)      11/15/2017 10:22   Sodium 139   Potassium 4.0   Chloride 108   Carbon Dioxide 23   Urea Nitrogen 39 (H)   Creatinine 1.38 (H)   GFR Estimate 37 (L)   GFR Estimate If Black 45 (L)   Calcium 8.6   Anion Gap 7   Albumin 3.4   Protein Total 7.3   Bilirubin Total 1.5 (H)   Alkaline Phosphatase 79   ALT 22   AST 14   Glucose 88     CSA pending    ASSESSMENT/PLAN: 71 year old female with AA, now has been transfusion INDEPENDENT since April 2017, still dealing with fatigue, OCASIO and low stamina but improving monthly.    HEME- Treatment for AA 1/9-1/13/17 with ATG, methylpred, cyclosporine and eltrombopag. Has been off her prednisone and ppx " medications since spring.  Her stamina has improved since I saw her last and counts are now independent from transfusions since April of 2017.   -cyclosporine 125 mg BID level is pending today  -eltrombopag 150 mg daily  -counts  q4 w  -transfuse if hgb <7.5 or symptomatic and platelets <30k   -q4-6 weeks for labs/visit    CV-two prior provoked DVT in 2012 and 2014 with travel. 2012 was associated with PE. Was on warfarin/lovenox in the past.  Transitioned to ASA which has been held in the setting of low platelets.  Off all BP medications    ID-  No fever or symptoms of infection. No ppx medications   - CMV was neg  - no more pentamidine    MSK- low back -Low thoracic mid spine pain.  Improving with PT.  Has h/o compression fractures.  Reviewed bone scan showing osteoporosis.  Discussed oral bisphos- however with her h/o dysphagia, might be better to give her Prolia q6m.  She has a tooth she feels might need to be extracted.  She will see the dentist and pursue this first before we start a bisphosphonate.     FEN: bobby improved, weight stable.      Sejal Walls PA-C

## 2017-11-16 NOTE — PROGRESS NOTES
AUDIOLOGY REPORT    SUBJECTIVE:  Bonny Raymundo is a 71 year old female who was seen in Audiology at the [facility] on 11/16/2017. Bonny was scheduled for a hearing evaluation; however she expressed to me that she would actually like to discuss hearing aids. Due to time constraints, a hearing aid consultation could not be completed. Bonny expressed understanding. She did leave me with her past audiology records. The most recent hearing evaluation from 8/22/2017 revealed left mild to severe mixed (primarily sensorineural) hearing loss, and right mild to asymmetric profound mixed hearing loss. Bonny reports no changes in her hearing aid did not want another hearing test.       PLAN:  Testing was not performed today as Bonny had a recent evaluation at the end of August. Bonny is scheduled to return for a hearing aid consultation on 2/8/2017. It is recommended that Bonny return for a hearing evaluation if she notices any changes or concerns. Please call this clinic with questions regarding these results or recommendations.      Dorys Garcia.  Licensed Audiologist  [place] #[id number]

## 2017-12-14 ENCOUNTER — OFFICE VISIT (OUTPATIENT)
Dept: TRANSPLANT | Facility: CLINIC | Age: 72
End: 2017-12-14
Attending: INTERNAL MEDICINE
Payer: COMMERCIAL

## 2017-12-14 ENCOUNTER — APPOINTMENT (OUTPATIENT)
Dept: LAB | Facility: CLINIC | Age: 72
End: 2017-12-14
Attending: INTERNAL MEDICINE
Payer: COMMERCIAL

## 2017-12-14 VITALS
WEIGHT: 116.7 LBS | DIASTOLIC BLOOD PRESSURE: 83 MMHG | OXYGEN SATURATION: 98 % | BODY MASS INDEX: 21.34 KG/M2 | TEMPERATURE: 98.5 F | SYSTOLIC BLOOD PRESSURE: 143 MMHG | RESPIRATION RATE: 16 BRPM | HEART RATE: 73 BPM

## 2017-12-14 DIAGNOSIS — M54.50 LEFT-SIDED LOW BACK PAIN WITHOUT SCIATICA, UNSPECIFIED CHRONICITY: ICD-10-CM

## 2017-12-14 DIAGNOSIS — K21.9 GASTROESOPHAGEAL REFLUX DISEASE WITHOUT ESOPHAGITIS: ICD-10-CM

## 2017-12-14 DIAGNOSIS — K59.00 CONSTIPATION, UNSPECIFIED CONSTIPATION TYPE: ICD-10-CM

## 2017-12-14 DIAGNOSIS — D61.3 IDIOPATHIC APLASTIC ANEMIA (H): ICD-10-CM

## 2017-12-14 DIAGNOSIS — D61.818 PANCYTOPENIA (H): ICD-10-CM

## 2017-12-14 DIAGNOSIS — D61.9 APLASTIC ANEMIA (H): ICD-10-CM

## 2017-12-14 DIAGNOSIS — K64.9 HEMORRHOIDS, UNSPECIFIED HEMORRHOID TYPE: ICD-10-CM

## 2017-12-14 LAB
ABO + RH BLD: NORMAL
ABO + RH BLD: NORMAL
ALBUMIN SERPL-MCNC: 3.6 G/DL (ref 3.4–5)
ALP SERPL-CCNC: 80 U/L (ref 40–150)
ALT SERPL W P-5'-P-CCNC: 21 U/L (ref 0–50)
ANION GAP SERPL CALCULATED.3IONS-SCNC: 6 MMOL/L (ref 3–14)
AST SERPL W P-5'-P-CCNC: 16 U/L (ref 0–45)
BASOPHILS # BLD AUTO: 0 10E9/L (ref 0–0.2)
BASOPHILS NFR BLD AUTO: 0 %
BILIRUB SERPL-MCNC: 1.4 MG/DL (ref 0.2–1.3)
BLD GP AB SCN SERPL QL: NORMAL
BLOOD BANK CMNT PATIENT-IMP: NORMAL
BUN SERPL-MCNC: 41 MG/DL (ref 7–30)
CALCIUM SERPL-MCNC: 8.5 MG/DL (ref 8.5–10.1)
CHLORIDE SERPL-SCNC: 110 MMOL/L (ref 94–109)
CO2 SERPL-SCNC: 24 MMOL/L (ref 20–32)
CREAT SERPL-MCNC: 1.5 MG/DL (ref 0.52–1.04)
DIFFERENTIAL METHOD BLD: ABNORMAL
EOSINOPHIL # BLD AUTO: 0 10E9/L (ref 0–0.7)
EOSINOPHIL NFR BLD AUTO: 0.9 %
ERYTHROCYTE [DISTWIDTH] IN BLOOD BY AUTOMATED COUNT: 14.7 % (ref 10–15)
GFR SERPL CREATININE-BSD FRML MDRD: 34 ML/MIN/1.7M2
GLUCOSE SERPL-MCNC: 116 MG/DL (ref 70–99)
HCT VFR BLD AUTO: 24.3 % (ref 35–47)
HGB BLD-MCNC: 8 G/DL (ref 11.7–15.7)
IMM GRANULOCYTES # BLD: 0 10E9/L (ref 0–0.4)
IMM GRANULOCYTES NFR BLD: 0 %
LYMPHOCYTES # BLD AUTO: 1.5 10E9/L (ref 0.8–5.3)
LYMPHOCYTES NFR BLD AUTO: 46.9 %
MCH RBC QN AUTO: 39.6 PG (ref 26.5–33)
MCHC RBC AUTO-ENTMCNC: 32.9 G/DL (ref 31.5–36.5)
MCV RBC AUTO: 120 FL (ref 78–100)
MONOCYTES # BLD AUTO: 0.3 10E9/L (ref 0–1.3)
MONOCYTES NFR BLD AUTO: 7.9 %
NEUTROPHILS # BLD AUTO: 1.4 10E9/L (ref 1.6–8.3)
NEUTROPHILS NFR BLD AUTO: 44.3 %
NRBC # BLD AUTO: 0 10*3/UL
NRBC BLD AUTO-RTO: 0 /100
PLATELET # BLD AUTO: 45 10E9/L (ref 150–450)
PLATELET # BLD EST: ABNORMAL 10*3/UL
POTASSIUM SERPL-SCNC: 4.5 MMOL/L (ref 3.4–5.3)
PROT SERPL-MCNC: 7.2 G/DL (ref 6.8–8.8)
RBC # BLD AUTO: 2.02 10E12/L (ref 3.8–5.2)
SODIUM SERPL-SCNC: 140 MMOL/L (ref 133–144)
SPECIMEN EXP DATE BLD: NORMAL
WBC # BLD AUTO: 3.2 10E9/L (ref 4–11)

## 2017-12-14 PROCEDURE — 36415 COLL VENOUS BLD VENIPUNCTURE: CPT

## 2017-12-14 PROCEDURE — 85025 COMPLETE CBC W/AUTO DIFF WBC: CPT | Performed by: INTERNAL MEDICINE

## 2017-12-14 PROCEDURE — 80053 COMPREHEN METABOLIC PANEL: CPT | Performed by: INTERNAL MEDICINE

## 2017-12-14 PROCEDURE — 86900 BLOOD TYPING SEROLOGIC ABO: CPT | Performed by: INTERNAL MEDICINE

## 2017-12-14 PROCEDURE — 99212 OFFICE O/P EST SF 10 MIN: CPT | Mod: ZF

## 2017-12-14 PROCEDURE — 86850 RBC ANTIBODY SCREEN: CPT | Performed by: INTERNAL MEDICINE

## 2017-12-14 PROCEDURE — 86901 BLOOD TYPING SEROLOGIC RH(D): CPT | Performed by: INTERNAL MEDICINE

## 2017-12-14 RX ORDER — CYCLOSPORINE 25 MG/1
25 CAPSULE, LIQUID FILLED ORAL 2 TIMES DAILY
Qty: 540 CAPSULE | COMMUNITY
Start: 2017-12-14 | End: 2018-01-11

## 2017-12-14 RX ORDER — CYCLOSPORINE 100 MG/1
100 CAPSULE, LIQUID FILLED ORAL 2 TIMES DAILY
Qty: 120 CAPSULE | Refills: 3 | COMMUNITY
Start: 2017-12-14 | End: 2018-01-11

## 2017-12-14 ASSESSMENT — PAIN SCALES - GENERAL: PAINLEVEL: NO PAIN (1)

## 2017-12-14 NOTE — NURSING NOTE
"Oncology Rooming Note    December 14, 2017 4:36 PM   Bonny Raymundo is a 71 year old female who presents for:    Chief Complaint   Patient presents with     Blood Draw     venipuncture     RECHECK     pt with hx of AA here for labs and md visit     Initial Vitals: /83  Pulse 73  Temp 98.5  F (36.9  C) (Oral)  Resp 16  Wt 52.9 kg (116 lb 11.2 oz)  SpO2 98%  BMI 21.34 kg/m2 Estimated body mass index is 21.34 kg/(m^2) as calculated from the following:    Height as of 11/15/17: 1.575 m (5' 2.01\").    Weight as of this encounter: 52.9 kg (116 lb 11.2 oz). Body surface area is 1.52 meters squared.  No Pain (1) Comment: Data Unavailable   No LMP recorded. Patient has had a hysterectomy.  Allergies reviewed: Yes  Medications reviewed: Yes    Medications: Medication refills not needed today.  Pharmacy name entered into [facility]:    [facility], [place] - [address]  [facility], [place] - [address]  [facility] [place], [place] - [id number] [place] [address]. [address]  WRITTEN PRESCRIPTION REQUESTED    Clinical concerns: no     6 minutes for nursing intake (face to face time)     Danette Centeno RN              "

## 2017-12-14 NOTE — LETTER
12/14/2017      RE: Bonny Raymundo  [address]  [facility] [address]       /83  Pulse 73  Temp 98.5  F (36.9  C) (Oral)  Resp 16  Wt 52.9 kg (116 lb 11.2 oz)  SpO2 98%  BMI 21.34 kg/m2  Wt Readings from Last 4 Encounters:   12/14/17 52.9 kg (116 lb 11.2 oz)   11/15/17 52.2 kg (115 lb 1.6 oz)   10/12/17 52.3 kg (115 lb 6.4 oz)   09/20/17 52.1 kg (114 lb 14.4 oz)     Bonny returns to follow-up her aplastic anemia. She continues on cyclosporine and eltrombopag. In the last few months she has needed no transfusions and has had substantial improvement now to only moderate aplastic anemia with considerably better safety. Her appetite and energy are approved it and her back pain is less troublesome. She's had no recent fever, chills, rash, bleeding or bruising. She has no new GI or  upset. She has some new dental problems; but they are mosly assymptomatic. The rest of her review of systems is unrevealing.    Her exam shows her pale as usual but comfortable, in no distress. She has no focal areas of bone tenderness. She has no lower extremity edema. Her oropharynx is clear with no mucosal lesions or petechiae. Her lungs are clear. Heart tones are regular with a soft ejection murmur but no gallop rhythm. Her abdomen is soft and nontender without palpable masses or hepatosplenomegaly.    Laboratory testing shows a stable mild pancytopenia with a platelet count 45,000 and her creatinine has elevated a bit up to 1.50 and it has been rising a bit over the last 4 months, likely all attributable to the cyclosporine therapy.    Today, I made 2 changes in her medications. First reducing the cyclosporine to 125 mg in the morning and 100 mg at night and we will not taper further, but will hope that that will reduce the modest renal insufficiency, but we'll need to monitor it.  We will continue the eltrombopag.    Additionally, she tells me that she has a right lower bicuspid that needs extraction and possible  dental implant because of bone resorption below the gumline. She has received additional oral surgery opinions about it and if the recommendation is that extraction and later a new tooth implant to be done, that would be suitable.    Because of her anemia and thrombocytopenia, however, before the dental extraction she should have a platelet transfusion. wthin 12 or 24 hours of the planned time along with antibiotics surrounding the dental procedure.    She was also recognized to have osteoporosis on a DEXA scan done in September and consideration was raised about treating with bone strengthening agent such as bisphosphonates or denosumab. Both of these my increase the potential of develolping osteonecrosis of the jaw or poor healing associated with the dental extraction and tooth implant.  Therefore, bone strengthening agents will need to be deferred until formal decision making and a plan is made about her dental work. We will not start though either of those classes of agents at this point.    Overall, her moderately severe aplastic anemia is responding well to immunosuppressive therapy and she continues to be free of the need for transfusions. We will continue to check her blood counts and her serum chemistry to monitor her renal function on a monthly basis, but its overall good to see how well she is doing.    Rafita Rogel M.D.    Professor of Medicine    Results for LISSETH PARIKH (MRN [id number]) as of 12/14/2017 17:31   Ref. Range 11/15/2017 10:22 12/14/2017 16:09   Sodium Latest Ref Range: 133 - 144 mmol/L 139 140   Potassium Latest Ref Range: 3.4 - 5.3 mmol/L 4.0 4.5   Chloride Latest Ref Range: 94 - 109 mmol/L 108 110 (H)   Carbon Dioxide Latest Ref Range: 20 - 32 mmol/L 23 24   Urea Nitrogen Latest Ref Range: 7 - 30 mg/dL 39 (H) 41 (H)   Creatinine Latest Ref Range: 0.52 - 1.04 mg/dL 1.38 (H) 1.50 (H)   GFR Estimate Latest Ref Range: >60 mL/min/1.7m2 37 (L) 34 (L)   GFR Estimate If Black Latest Ref  Range: >60 mL/min/1.7m2 45 (L) 41 (L)   Calcium Latest Ref Range: 8.5 - 10.1 mg/dL 8.6 8.5   Anion Gap Latest Ref Range: 3 - 14 mmol/L 7 6   Albumin Latest Ref Range: 3.4 - 5.0 g/dL 3.4 3.6   Protein Total Latest Ref Range: 6.8 - 8.8 g/dL 7.3 7.2   Bilirubin Total Latest Ref Range: 0.2 - 1.3 mg/dL 1.5 (H) 1.4 (H)   Alkaline Phosphatase Latest Ref Range: 40 - 150 U/L 79 80   ALT Latest Ref Range: 0 - 50 U/L 22 21   AST Latest Ref Range: 0 - 45 U/L 14 16   Glucose Latest Ref Range: 70 - 99 mg/dL 88 116 (H)   WBC Latest Ref Range: 4.0 - 11.0 10e9/L 2.8 (L) 3.2 (L)   Hemoglobin Latest Ref Range: 11.7 - 15.7 g/dL 8.4 (L) 8.0 (L)   Hematocrit Latest Ref Range: 35.0 - 47.0 % 25.7 (L) 24.3 (L)   Platelet Count Latest Ref Range: 150 - 450 10e9/L 49 (LL) 45 (LL)   RBC Count Latest Ref Range: 3.8 - 5.2 10e12/L 2.12 (L) 2.02 (L)   MCV Latest Ref Range: 78 - 100 fl 121 (H) 120 (H)   MCH Latest Ref Range: 26.5 - 33.0 pg 39.6 (H) 39.6 (H)   MCHC Latest Ref Range: 31.5 - 36.5 g/dL 32.7 32.9   RDW Latest Ref Range: 10.0 - 15.0 % 14.1 14.7   Diff Method Unknown Automated Method Automated Method   % Neutrophils Latest Units: % 49.1 44.3   % Lymphocytes Latest Units: % 39.5 46.9   % Monocytes Latest Units: % 8.5 7.9   % Eosinophils Latest Units: % 2.1 0.9   % Basophils Latest Units: % 0.4 0.0   % Immature Granulocytes Latest Units: % 0.4 0.0   Nucleated RBCs Latest Ref Range: 0 /100 0 0   Absolute Neutrophil Latest Ref Range: 1.6 - 8.3 10e9/L 1.4 (L) 1.4 (L)   Absolute Lymphocytes Latest Ref Range: 0.8 - 5.3 10e9/L 1.1 1.5   Absolute Monocytes Latest Ref Range: 0.0 - 1.3 10e9/L 0.2 0.3   Absolute Eosinophils Latest Ref Range: 0.0 - 0.7 10e9/L 0.1 0.0   Absolute Basophils Latest Ref Range: 0.0 - 0.2 10e9/L 0.0 0.0   Abs Immature Granulocytes Latest Ref Range: 0 - 0.4 10e9/L 0.0 0.0   Absolute Nucleated RBC Unknown 0.0 0.0   Platelet Estimate Unknown  Confirming automa...   % Retic Latest Ref Range: 0.5 - 2.0 % 2.2 (H)    Absolute Retic  Latest Ref Range: 25 - 95 10e9/L 46.9    ABO Unknown A    RH(D) Unknown Pos    Antibody Screen Unknown Neg    Test Valid Only At Latest Units:     [facility]..    Specimen Expires Unknown 11/18/2017    Cyclosporine Last Dose Unknown 11/14/17 2230    Cyclosporine Level Latest Ref Range: 50 - 400 ug/L 174        Rafita Rogel MD

## 2017-12-14 NOTE — NURSING NOTE
Chief Complaint   Patient presents with     Blood Draw     venipuncture     Vitals done and labs drawn, see flow sheets.  ANTHONY SPRINGER, CMA

## 2017-12-14 NOTE — MR AVS SNAPSHOT
After Visit Summary   12/14/2017    Bonny Raymundo    MRN: [id number]           Patient Information     Date Of Birth          1945        Visit Information        Provider Department      12/14/2017 4:30 PM Rafita Rogel MD [facility] Trans[facility]        Today's Diagnoses     Pancytopenia (H)        Idiopathic aplastic anemia (H)        Gastroesophageal reflux disease without esophagitis        Left-sided low back pain without sciatica, unspecified chronicity        Constipation, unspecified constipation type        Hemorrhoids, unspecified hemorrhoid type        Aplastic anemia (H)              [facility] ([facility])  [address] [place] [id number]  Phone: [phone]  Clinic Hours:   Monday-Thursday:7am to 7pm   Friday: 7am to 5pm   Weekends and holidays:    8am to noon (in general)  If your fever is 100.5  or greater,   call the clinic.  After hours call the   hospital at [phone] or   [phone]. Ask for the BMT   fellow on-call            Follow-ups after your visit        Follow-up notes from your care team     Return for Physical Exam, Lab Work, Routine Visit.      Your next 10 appointments already scheduled     Benton 10, 2018  9:45 AM CST   [facility] Draw with  [facility]   [facility] ([facility])    [address] [place] [id number] [phone]            B[place] 10, 2018 10:20 AM CST   (Arrive by 10:05 AM)   Return Visit with Celine Walls PA-C   [facility] ([facility])    [address] [place] [id number] [phone]            Feb 08, 2018  1:00 PM CST   Hearing Aid Consult with Kamilah Grey   [facility] ([facility])    [address] [place] [id number]   [phone]            Feb 08, 2018  2:30 PM CST   [facility]  "Lab Draw with  [facility]   [facility] ([facility])    [address] [place] [address]  [address] [facility] [id number] [phone]            Feb 08, 2018  3:00 PM CST   RETURN ONC with Rafita Rogel MD   [facility] Marrow Transplant ([facility])    [address] [place] [id number] [phone]            Mar 01, 2018  1:00 PM CST   Hearing Aid Fitting with Kamilah Grey   [facility] ([facility])    [address] [place] [address]  [address] [facility] [id number] [phone]            Apr 12, 2018  1:00 PM CDT   (Arrive by 12:45 PM)   Initial Review Program with Kamilah Grey   [facility] ([facility])    [address] [place] [id number] [phone]              Who to contact     If you have questions or need follow up information about today's clinic visit or your schedule please contact [facility] directly at [phone].  Normal or non-critical lab and imaging results will be communicated to you by [employer], letter or phone within 4 business days after the clinic has received the results. If you do not hear from us within 7 days, please contact the clinic through [employer] or phone. If you have a critical or abnormal lab result, we will notify you by phone as soon as possible.  Submit refill requests through [employer] or call your pharmacy and they will forward the refill request to us. Please allow 3 business days for your refill to be completed.          Additional Information About Your Visit        [employer] Information     [employer] lets you send messages to your doctor, view your test results, renew your prescriptions, schedule appointments and more. To sign up, go to [email or web address] . Click on \"Log in\" on the left side of the screen, which will take you to " "the Welcome page. Then click on \"Sign up Now\" on the right side of the page.     You will be asked to enter the access code listed below, as well as some personal information. Please follow the directions to create your username and password.     Your access code is: [email or web address]  Expires: 2017  9:51 AM     Your access code will  in 90 days. If you need help or a new code, please call your [facility] clinic or [phone].        Care EveryWhere ID     This is your Care EveryWhere ID. This could be used by other organizations to access your [facility] medical records  [id number]        Your Vitals Were     Pulse Temperature Respirations Pulse Oximetry BMI (Body Mass Index)       73 98.5  F (36.9  C) (Oral) 16 98% 21.34 kg/m2        Blood Pressure from Last 3 Encounters:   17 143/83   11/15/17 127/83   10/12/17 137/82    Weight from Last 3 Encounters:   17 52.9 kg (116 lb 11.2 oz)   11/15/17 52.2 kg (115 lb 1.6 oz)   10/12/17 52.3 kg (115 lb 6.4 oz)              We Performed the Following     ABO/Rh type and screen     CBC with platelets differential     Comprehensive metabolic panel          Today's Medication Changes          These changes are accurate as of: 17  5:32 PM.  If you have any questions, ask your nurse or doctor.               These medicines have changed or have updated prescriptions.        Dose/Directions    * cycloSPORINE modified 100 MG capsule   Commonly known as:  GENERIC EQUIVALENT   This may have changed:  additional instructions   Used for:  Gastroesophageal reflux disease without esophagitis, Left-sided low back pain without sciatica, unspecified chronicity, Constipation, unspecified constipation type, Hemorrhoids, unspecified hemorrhoid type   Changed by:  Rafita Rogel MD        Dose:  100 mg   Take 1 capsule (100 mg) by mouth 2 times daily Change to 125 mg AM;  100 mg pm as of Dec 14, 2017   Quantity:  120 capsule   Refills:  3       * cycloSPORINE " modified 25 MG capsule   Commonly known as:  GENERIC EQUIVALENT   This may have changed:  additional instructions   Used for:  Aplastic anemia (H)   Changed by:  Rafita Rogel MD        Dose:  25 mg   Take 1 capsule (25 mg) by mouth 2 times daily 125 mg AM   100 mg PM as of Dec 14, 2017 or as directed   Quantity:  540 capsule   Refills:  0       * Notice:  This list has 2 medication(s) that are the same as other medications prescribed for you. Read the directions carefully, and ask your doctor or other care provider to review them with you.             Recent Review Flowsheet Data     BMT Recent Results Latest Ref Rng & Units 7/12/2017 8/2/2017 8/23/2017 9/20/2017 10/12/2017 11/15/2017 12/14/2017    WBC 4.0 - 11.0 10e9/L 4.3 3.8(L) 3.7(L) 3.6(L) 2.7(L) 2.8(L) 3.2(L)    Hemoglobin 11.7 - 15.7 g/dL 7.9(L) 7.8(L) 8.1(L) 8.6(L) 8.4(L) 8.4(L) 8.0(L)    Platelet Count 150 - 450 10e9/L 40(LL) 38(LL) 40(LL) 46(LL) 38(LL) 49(LL) 45(LL)    Neutrophils (Absolute) 1.6 - 8.3 10e9/L 1.9 1.8 2.1 2.0 0.9(L) 1.4(L) 1.4(L)    INR 0.86 - 1.14 - - - - - - -    Sodium 133 - 144 mmol/L 137 139 140 137 138 139 140    Potassium 3.4 - 5.3 mmol/L 5.9(H) 4.2 4.1 3.6 4.3 4.0 4.5    Chloride 94 - 109 mmol/L 108 107 108 105 107 108 110(H)    Glucose 70 - 99 mg/dL 141(H) 88 107(H) 115(H) 103(H) 88 116(H)    Urea Nitrogen 7 - 30 mg/dL 31(H) 40(H) 43(H) 28 35(H) 39(H) 41(H)    Creatinine 0.52 - 1.04 mg/dL 1.31(H) 1.36(H) 1.25(H) 1.29(H) 1.34(H) 1.38(H) 1.50(H)    Calcium (Total) 8.5 - 10.1 mg/dL 8.6 8.9 8.9 9.0 8.7 8.6 8.5    Protein (Total) 6.8 - 8.8 g/dL 7.0 7.1 7.2 7.7 7.1 7.3 7.2    Albumin 3.4 - 5.0 g/dL 3.2(L) 3.2(L) 3.4 3.6 3.4 3.4 3.6    Bilirubin (Direct) 0.0 - 0.2 mg/dL - - - - - - -    Alkaline Phosphatase 40 - 150 U/L 72 80 80 78 72 79 80    AST 0 - 45 U/L 29 12 16 14 13 14 16    ALT 0 - 50 U/L 22 23 25 25 20 22 21    MCV 78 - 100 fl 119(H) 124(H) 117(H) 119(H) 119(H) 121(H) 120(H)               Primary Care Provider Office  Phone # Fax #    Shivnai Phelps PA-C [phone] [address] [facility] [address]  [facility] [address]        Equal Access to Services     CHARLES NOLASCO : Hadii aad ku hadlatishao Soomaali, waaxda luqadaha, qaybta kaalmada adeegyada, ambika luziliana levine. So [facility] [phone].    ATENCIÓN: Si habla español, tiene a robertson disposición servicios gratuitos de asistencia lingüística. Llame al [phone].    We comply with applicable federal civil rights laws and [place] laws. We do not discriminate on the basis of race, color, national origin, age, disability, sex, sexual orientation, or gender identity.            Thank you!     Thank you for choosing [facility] TRANSPL[facility]  for your care. Our goal is always to provide you with excellent care. Hearing back from our patients is one way we can continue to improve our services. Please take a few minutes to complete the written survey that you may receive in the mail after your visit with us. Thank you!             Your Updated Medication List - Protect others around you: Learn how to safely use, store and throw away your medicines at www.disposemymeds.org.          This list is accurate as of: 12/14/17  5:32 PM.  Always use your most recent med list.                   Brand Name Dispense Instructions for use Diagnosis    BENADRYL ALLERGY 25 MG tablet   Generic drug:  diphenhydrAMINE     56 tablet    Take 25 mg by mouth At Bedtime        chlorpheniramine 4 MG tablet    CHLOR-TRIMETON     Take 4 mg by mouth every 6 hours as needed. For head cold        COMPRESSION STOCKINGS     2 each    Wear compression stockings at 20-30 mmHg rating most time during the day to the affected leg (left leg) or both legs. Take them off at night.    DVT (deep venous thrombosis), left, Postphlebitic syndrome       * cycloSPORINE modified 100 MG capsule    GENERIC EQUIVALENT    120 capsule    Take 1 capsule (100 mg) by mouth 2 times daily Change to 125 mg  AM;  100 mg pm as of Dec 14, 2017    Gastroesophageal reflux disease without esophagitis, Left-sided low back pain without sciatica, unspecified chronicity, Constipation, unspecified constipation type, Hemorrhoids, unspecified hemorrhoid type       * cycloSPORINE modified 25 MG capsule    GENERIC EQUIVALENT    540 capsule    Take 1 capsule (25 mg) by mouth 2 times daily 125 mg AM   100 mg PM as of Dec 14, 2017 or as directed    Aplastic anemia (H)       diclofenac 1 % Gel topical gel    VOLTAREN    100 g    Apply 2 g topically 2 times daily    Myofascial pain       eltrombopag 50 MG tablet    PROMACTA    90 tablet    Take 3 tablets (150 mg) by mouth daily Administer on an empty stomach, 1 hour before or 2 hours after a meal. Or as directed    Idiopathic aplastic anemia (H), Pancytopenia (H)       fluticasone 50 MCG/ACT spray    FLONASE    1 Bottle    Spray 1-2 sprays into both nostrils daily    Rhinitis medicamentosa, Bilateral impacted cerumen, Nasal congestion       loratadine 10 MG tablet    CLARITIN     Take 10 mg by mouth daily as needed Reported on 4/26/2017        order for DME     1 Box    Equipment being ordered: knee high compression stockings- 18-20 mm    DVT prophylaxis       oxyCODONE IR 5 MG tablet    ROXICODONE    50 tablet    Take 1 tablet (5 mg) by mouth every 6 hours as needed for moderate to severe pain    Left-sided low back pain without sciatica, unspecified chronicity       pantoprazole 40 MG EC tablet    PROTONIX     Take 1 tablet (40 mg) by mouth daily    Gastroesophageal reflux disease without esophagitis, Left-sided low back pain without sciatica, unspecified chronicity, Constipation, unspecified constipation type, Hemorrhoids, unspecified hemorrhoid type, Idiopathic aplastic anemia (H), Pancytopenia (H)       TYLENOL PO      Take 325 mg by mouth every 6 hours as needed for mild pain or fever        * Notice:  This list has 2 medication(s) that are the same as other medications prescribed  for you. Read the directions carefully, and ask your doctor or other care provider to review them with you.

## 2017-12-14 NOTE — PROGRESS NOTES
/83  Pulse 73  Temp 98.5  F (36.9  C) (Oral)  Resp 16  Wt 52.9 kg (116 lb 11.2 oz)  SpO2 98%  BMI 21.34 kg/m2  Wt Readings from Last 4 Encounters:   12/14/17 52.9 kg (116 lb 11.2 oz)   11/15/17 52.2 kg (115 lb 1.6 oz)   10/12/17 52.3 kg (115 lb 6.4 oz)   09/20/17 52.1 kg (114 lb 14.4 oz)     Bonny returns to follow-up her aplastic anemia. She continues on cyclosporine and eltrombopag. In the last few months she has needed no transfusions and has had substantial improvement now to only moderate aplastic anemia with considerably better safety. Her appetite and energy are approved it and her back pain is less troublesome. She's had no recent fever, chills, rash, bleeding or bruising. She has no new GI or  upset. She has some new dental problems; but they are mosly assymptomatic. The rest of her review of systems is unrevealing.    Her exam shows her pale as usual but comfortable, in no distress. She has no focal areas of bone tenderness. She has no lower extremity edema. Her oropharynx is clear with no mucosal lesions or petechiae. Her lungs are clear. Heart tones are regular with a soft ejection murmur but no gallop rhythm. Her abdomen is soft and nontender without palpable masses or hepatosplenomegaly.    Laboratory testing shows a stable mild pancytopenia with a platelet count 45,000 and her creatinine has elevated a bit up to 1.50 and it has been rising a bit over the last 4 months, likely all attributable to the cyclosporine therapy.    Today, I made 2 changes in her medications. First reducing the cyclosporine to 125 mg in the morning and 100 mg at night and we will not taper further, but will hope that that will reduce the modest renal insufficiency, but we'll need to monitor it.  We will continue the eltrombopag.    Additionally, she tells me that she has a right lower bicuspid that needs extraction and possible dental implant because of bone resorption below the gumline. She has received  additional oral surgery opinions about it and if the recommendation is that extraction and later a new tooth implant to be done, that would be suitable.    Because of her anemia and thrombocytopenia, however, before the dental extraction she should have a platelet transfusion. wthin 12 or 24 hours of the planned time along with antibiotics surrounding the dental procedure.    She was also recognized to have osteoporosis on a DEXA scan done in September and consideration was raised about treating with bone strengthening agent such as bisphosphonates or denosumab. Both of these my increase the potential of develolping osteonecrosis of the jaw or poor healing associated with the dental extraction and tooth implant.  Therefore, bone strengthening agents will need to be deferred until formal decision making and a plan is made about her dental work. We will not start though either of those classes of agents at this point.    Overall, her moderately severe aplastic anemia is responding well to immunosuppressive therapy and she continues to be free of the need for transfusions. We will continue to check her blood counts and her serum chemistry to monitor her renal function on a monthly basis, but its overall good to see how well she is doing.    Rafita Rogel M.D.    Professor of Medicine    Results for LISSETH PARIKH (MRN [id number]) as of 12/14/2017 17:31   Ref. Range 11/15/2017 10:22 12/14/2017 16:09   Sodium Latest Ref Range: 133 - 144 mmol/L 139 140   Potassium Latest Ref Range: 3.4 - 5.3 mmol/L 4.0 4.5   Chloride Latest Ref Range: 94 - 109 mmol/L 108 110 (H)   Carbon Dioxide Latest Ref Range: 20 - 32 mmol/L 23 24   Urea Nitrogen Latest Ref Range: 7 - 30 mg/dL 39 (H) 41 (H)   Creatinine Latest Ref Range: 0.52 - 1.04 mg/dL 1.38 (H) 1.50 (H)   GFR Estimate Latest Ref Range: >60 mL/min/1.7m2 37 (L) 34 (L)   GFR Estimate If Black Latest Ref Range: >60 mL/min/1.7m2 45 (L) 41 (L)   Calcium Latest Ref Range: 8.5 - 10.1  mg/dL 8.6 8.5   Anion Gap Latest Ref Range: 3 - 14 mmol/L 7 6   Albumin Latest Ref Range: 3.4 - 5.0 g/dL 3.4 3.6   Protein Total Latest Ref Range: 6.8 - 8.8 g/dL 7.3 7.2   Bilirubin Total Latest Ref Range: 0.2 - 1.3 mg/dL 1.5 (H) 1.4 (H)   Alkaline Phosphatase Latest Ref Range: 40 - 150 U/L 79 80   ALT Latest Ref Range: 0 - 50 U/L 22 21   AST Latest Ref Range: 0 - 45 U/L 14 16   Glucose Latest Ref Range: 70 - 99 mg/dL 88 116 (H)   WBC Latest Ref Range: 4.0 - 11.0 10e9/L 2.8 (L) 3.2 (L)   Hemoglobin Latest Ref Range: 11.7 - 15.7 g/dL 8.4 (L) 8.0 (L)   Hematocrit Latest Ref Range: 35.0 - 47.0 % 25.7 (L) 24.3 (L)   Platelet Count Latest Ref Range: 150 - 450 10e9/L 49 (LL) 45 (LL)   RBC Count Latest Ref Range: 3.8 - 5.2 10e12/L 2.12 (L) 2.02 (L)   MCV Latest Ref Range: 78 - 100 fl 121 (H) 120 (H)   MCH Latest Ref Range: 26.5 - 33.0 pg 39.6 (H) 39.6 (H)   MCHC Latest Ref Range: 31.5 - 36.5 g/dL 32.7 32.9   RDW Latest Ref Range: 10.0 - 15.0 % 14.1 14.7   Diff Method Unknown Automated Method Automated Method   % Neutrophils Latest Units: % 49.1 44.3   % Lymphocytes Latest Units: % 39.5 46.9   % Monocytes Latest Units: % 8.5 7.9   % Eosinophils Latest Units: % 2.1 0.9   % Basophils Latest Units: % 0.4 0.0   % Immature Granulocytes Latest Units: % 0.4 0.0   Nucleated RBCs Latest Ref Range: 0 /100 0 0   Absolute Neutrophil Latest Ref Range: 1.6 - 8.3 10e9/L 1.4 (L) 1.4 (L)   Absolute Lymphocytes Latest Ref Range: 0.8 - 5.3 10e9/L 1.1 1.5   Absolute Monocytes Latest Ref Range: 0.0 - 1.3 10e9/L 0.2 0.3   Absolute Eosinophils Latest Ref Range: 0.0 - 0.7 10e9/L 0.1 0.0   Absolute Basophils Latest Ref Range: 0.0 - 0.2 10e9/L 0.0 0.0   Abs Immature Granulocytes Latest Ref Range: 0 - 0.4 10e9/L 0.0 0.0   Absolute Nucleated RBC Unknown 0.0 0.0   Platelet Estimate Unknown  Confirming automa...   % Retic Latest Ref Range: 0.5 - 2.0 % 2.2 (H)    Absolute Retic Latest Ref Range: 25 - 95 10e9/L 46.9    ABO Unknown A    RH(D) Unknown Pos     Antibody Screen Unknown Neg    Test Valid Only At Latest Units:     [facility]...    Specimen Expires Unknown 11/18/2017    Cyclosporine Last Dose Unknown 11/14/17 2230    Cyclosporine Level Latest Ref Range: 50 - 400 ug/L 174

## 2017-12-28 ENCOUNTER — MEDICAL CORRESPONDENCE (OUTPATIENT)
Dept: HEALTH INFORMATION MANAGEMENT | Facility: CLINIC | Age: 72
End: 2017-12-28

## 2018-01-04 DIAGNOSIS — D61.9 APLASTIC ANEMIA (H): Primary | ICD-10-CM

## 2018-01-04 NOTE — PROGRESS NOTES
Aplastic anemia  SCheduled for dental extraction (near [facility]) Jan 25th at 12:10P  Spoke with oral surgeon. He'll give antibiotics.   Extraction will require some bone removal.    We'll set up to check coags ahead (ordered for next week).  And to give plts in AM Jan 25 prior to dental extraction; and possible 2nd dose of plts post extraction if there is persistent bleeding.    BARB Rogel

## 2018-01-10 ENCOUNTER — ONCOLOGY VISIT (OUTPATIENT)
Dept: ONCOLOGY | Facility: CLINIC | Age: 73
End: 2018-01-10
Attending: PHYSICIAN ASSISTANT
Payer: COMMERCIAL

## 2018-01-10 ENCOUNTER — APPOINTMENT (OUTPATIENT)
Dept: LAB | Facility: CLINIC | Age: 73
End: 2018-01-10
Attending: INTERNAL MEDICINE
Payer: COMMERCIAL

## 2018-01-10 ENCOUNTER — CARE COORDINATION (OUTPATIENT)
Dept: ONCOLOGY | Facility: CLINIC | Age: 73
End: 2018-01-10

## 2018-01-10 VITALS
RESPIRATION RATE: 16 BRPM | OXYGEN SATURATION: 98 % | BODY MASS INDEX: 21.12 KG/M2 | TEMPERATURE: 98 F | HEART RATE: 94 BPM | SYSTOLIC BLOOD PRESSURE: 139 MMHG | DIASTOLIC BLOOD PRESSURE: 82 MMHG | WEIGHT: 115.5 LBS

## 2018-01-10 DIAGNOSIS — D61.9 APLASTIC ANEMIA (H): ICD-10-CM

## 2018-01-10 LAB
ABO + RH BLD: NORMAL
ABO + RH BLD: NORMAL
ALBUMIN SERPL-MCNC: 3.5 G/DL (ref 3.4–5)
ALP SERPL-CCNC: 81 U/L (ref 40–150)
ALT SERPL W P-5'-P-CCNC: 11 U/L (ref 0–50)
ANION GAP SERPL CALCULATED.3IONS-SCNC: 8 MMOL/L (ref 3–14)
AST SERPL W P-5'-P-CCNC: 13 U/L (ref 0–45)
BASOPHILS # BLD AUTO: 0 10E9/L (ref 0–0.2)
BASOPHILS NFR BLD AUTO: 0 %
BILIRUB SERPL-MCNC: 1.1 MG/DL (ref 0.2–1.3)
BLD GP AB SCN SERPL QL: NORMAL
BLOOD BANK CMNT PATIENT-IMP: NORMAL
BUN SERPL-MCNC: 37 MG/DL (ref 7–30)
CALCIUM SERPL-MCNC: 8.7 MG/DL (ref 8.5–10.1)
CHLORIDE SERPL-SCNC: 109 MMOL/L (ref 94–109)
CO2 SERPL-SCNC: 24 MMOL/L (ref 20–32)
CREAT SERPL-MCNC: 1.71 MG/DL (ref 0.52–1.04)
CYCLOSPORINE BLD LC/MS/MS-MCNC: 152 UG/L (ref 50–400)
DIFFERENTIAL METHOD BLD: ABNORMAL
EOSINOPHIL # BLD AUTO: 0.1 10E9/L (ref 0–0.7)
EOSINOPHIL NFR BLD AUTO: 3.9 %
ERYTHROCYTE [DISTWIDTH] IN BLOOD BY AUTOMATED COUNT: 14.9 % (ref 10–15)
GFR SERPL CREATININE-BSD FRML MDRD: 29 ML/MIN/1.7M2
GLUCOSE SERPL-MCNC: 92 MG/DL (ref 70–99)
HCT VFR BLD AUTO: 27.1 % (ref 35–47)
HGB BLD-MCNC: 8.8 G/DL (ref 11.7–15.7)
IMM GRANULOCYTES # BLD: 0 10E9/L (ref 0–0.4)
IMM GRANULOCYTES NFR BLD: 0 %
INR PPP: 1.01 (ref 0.86–1.14)
LYMPHOCYTES # BLD AUTO: 1 10E9/L (ref 0.8–5.3)
LYMPHOCYTES NFR BLD AUTO: 36.1 %
MCH RBC QN AUTO: 39.1 PG (ref 26.5–33)
MCHC RBC AUTO-ENTMCNC: 32.5 G/DL (ref 31.5–36.5)
MCV RBC AUTO: 120 FL (ref 78–100)
MONOCYTES # BLD AUTO: 0.2 10E9/L (ref 0–1.3)
MONOCYTES NFR BLD AUTO: 8.4 %
NEUTROPHILS # BLD AUTO: 1.5 10E9/L (ref 1.6–8.3)
NEUTROPHILS NFR BLD AUTO: 51.6 %
NRBC # BLD AUTO: 0 10*3/UL
NRBC BLD AUTO-RTO: 0 /100
PLATELET # BLD AUTO: 51 10E9/L (ref 150–450)
POTASSIUM SERPL-SCNC: 4 MMOL/L (ref 3.4–5.3)
PROT SERPL-MCNC: 7.7 G/DL (ref 6.8–8.8)
RBC # BLD AUTO: 2.25 10E12/L (ref 3.8–5.2)
RETICS # AUTO: 63 10E9/L (ref 25–95)
RETICS/RBC NFR AUTO: 2.8 % (ref 0.5–2)
SODIUM SERPL-SCNC: 142 MMOL/L (ref 133–144)
SPECIMEN EXP DATE BLD: NORMAL
TME LAST DOSE: NORMAL H
WBC # BLD AUTO: 2.9 10E9/L (ref 4–11)

## 2018-01-10 PROCEDURE — 86901 BLOOD TYPING SEROLOGIC RH(D): CPT | Performed by: INTERNAL MEDICINE

## 2018-01-10 PROCEDURE — 85610 PROTHROMBIN TIME: CPT | Performed by: INTERNAL MEDICINE

## 2018-01-10 PROCEDURE — 86900 BLOOD TYPING SEROLOGIC ABO: CPT | Performed by: INTERNAL MEDICINE

## 2018-01-10 PROCEDURE — 99214 OFFICE O/P EST MOD 30 MIN: CPT | Mod: ZP | Performed by: PHYSICIAN ASSISTANT

## 2018-01-10 PROCEDURE — 36415 COLL VENOUS BLD VENIPUNCTURE: CPT

## 2018-01-10 PROCEDURE — G0463 HOSPITAL OUTPT CLINIC VISIT: HCPCS

## 2018-01-10 PROCEDURE — 80158 DRUG ASSAY CYCLOSPORINE: CPT | Performed by: INTERNAL MEDICINE

## 2018-01-10 PROCEDURE — 80053 COMPREHEN METABOLIC PANEL: CPT | Performed by: INTERNAL MEDICINE

## 2018-01-10 PROCEDURE — 86850 RBC ANTIBODY SCREEN: CPT | Performed by: INTERNAL MEDICINE

## 2018-01-10 PROCEDURE — 85025 COMPLETE CBC W/AUTO DIFF WBC: CPT | Performed by: INTERNAL MEDICINE

## 2018-01-10 PROCEDURE — 85045 AUTOMATED RETICULOCYTE COUNT: CPT | Performed by: INTERNAL MEDICINE

## 2018-01-10 ASSESSMENT — PAIN SCALES - GENERAL: PAINLEVEL: NO PAIN (0)

## 2018-01-10 NOTE — NURSING NOTE
"Oncology Rooming Note    January 10, 2018 10:59 AM   Bonny Raymundo is a 72 year old female who presents for:    Chief Complaint   Patient presents with     Blood Draw     VS done, labs collected via venipuncture by RN.  Hx aplastic anemia.     Oncology Clinic Visit     Alpastic Anemia 1 month f./u      Initial Vitals: /82  Pulse 94  Temp 98  F (36.7  C)  Resp 16  Wt 52.4 kg (115 lb 8 oz)  SpO2 98%  BMI 21.12 kg/m2 Estimated body mass index is 21.12 kg/(m^2) as calculated from the following:    Height as of 11/15/17: 1.575 m (5' 2.01\").    Weight as of this encounter: 52.4 kg (115 lb 8 oz). Body surface area is 1.51 meters squared.  No Pain (0) Comment: Data Unavailable   No LMP recorded. Patient has had a hysterectomy.  Allergies reviewed: Yes  Medications reviewed: Yes    Medications: Medication refills not needed today.  Pharmacy name entered into [facility]:    [facility] [place], [place] - [address], [address]  [facility] [place], [place] - [address]  [facility] [place], [place] - [id number] [place] [address]. [address]  WRITTEN PRESCRIPTION REQUESTED    Clinical concerns: No concerns  provider was NOT notified.    7 minutes for nursing intake (face to face time)     Karthikeyan Thompson              "

## 2018-01-10 NOTE — MR AVS SNAPSHOT
After Visit Summary   1/10/2018    Bonny Raymundo    MRN: [id number]           Patient Information     Date Of Birth          1945        Visit Information        Provider Department      1/10/2018 10:20 AM Celine Walls PA-C [facility]        Today's Diagnoses     Aplastic anemia (H)           Follow-ups after your visit        Your next 10 appointments already scheduled     Jan 25, 2018  9:30 AM CST   Level 3 with SH INFUSION CHAIR 17   [facility] ([facility])    [facility]  [address]  [place] [address]   [phone]            Jan 25, 2018  2:00 PM CST   Level 3 with SH INFUSION CHAIR 17   [facility] ([facility])    [facility]  [address]  [place] [address]   [phone]            Feb 07, 2018  1:30 PM CST   Hearing Aid Consult with Kamilah Cruz [facility] ([facility])    [address] [place] [id number]   [phone]            Feb 15, 2018  4:00 PM CST   [facility] Lab Draw with  [facility] LAB DRAW   [facility] Lab Draw ([facility])    [address] [place] [address]  [address] [place] [address]   [phone]            Feb 15, 2018  4:30 PM CST   RETURN ONC with Rafita Rogel MD   [facility] Blood and Marrow Transplant ([facility])    [address] [place] [address]  [address] [place] [address]   [phone]            Mar 01, 2018  1:00 PM CST   Hearing Aid Fitting with Kamilah Grey [facility] ([facility])    [address] [place] [id number]   [phone]            Apr 12, 2018  1:00 PM CDT   (Arrive by 12:45 PM)   Initial Review Program with Kamilah Grey [facility]  "([facility])    [address]  [address] [facility] [address] [phone]              Who to contact     If you have questions or need follow up information about today's clinic visit or your schedule please contact [facility] directly at [phone].  Normal or non-critical lab and imaging results will be communicated to you by MyChart, letter or phone within 4 business days after the clinic has received the results. If you do not hear from us within 7 days, please contact the clinic through MyChart or phone. If you have a critical or abnormal lab result, we will notify you by phone as soon as possible.  Submit refill requests through [employer] or call your pharmacy and they will forward the refill request to us. Please allow 3 business days for your refill to be completed.          Additional Information About Your Visit        RealPage[employer] Information     [employer] lets you send messages to your doctor, view your test results, renew your prescriptions, schedule appointments and more. To sign up, go to [email or web address] . Click on \"Log in\" on the left side of the screen, which will take you to the Welcome page. Then click on \"Sign up Now\" on the right side of the page.     You will be asked to enter the access code listed below, as well as some personal information. Please follow the directions to create your username and password.     Your access code is: [email or web address]  Expires: 3/27/2018  6:31 AM     Your access code will  in 90 days. If you need help or a new code, please call your [facility] clinic or [phone].        Care EveryWhere ID     This is your Care EveryWhere ID. This could be used by other organizations to access your [facility] medical records  [id number]        Your Vitals Were     Pulse Temperature Respirations Pulse Oximetry BMI (Body Mass Index)       94 98  F (36.7  C) 16 98% 21.12 kg/m2        Blood Pressure from Last 3 " Encounters:   01/10/18 139/82   12/14/17 143/83   11/15/17 127/83    Weight from Last 3 Encounters:   01/10/18 52.4 kg (115 lb 8 oz)   12/14/17 52.9 kg (116 lb 11.2 oz)   11/15/17 52.2 kg (115 lb 1.6 oz)              We Performed the Following     ABO/Rh type and screen     CBC with platelets differential     Comprehensive metabolic panel     Cyclosporine     INR     Reticulocyte count        Primary Care Provider Office Phone # Fax #    Shivani Phelps PA-C [phone] [address]  [place] [address]        Equal Access to Services     LANDON NOLASCO : Hadii erica Hines, wacharlotte lynn, qaybangela kaalmada denzel, ambika levine. So [facility] [phone].    ATENCIÓN: Si habla español, tiene a robertson disposición servicios gratuitos de asistencia lingüística. Llame al [phone].    We comply with applicable federal civil rights laws and [place] laws. We do not discriminate on the basis of race, color, national origin, age, disability, sex, sexual orientation, or gender identity.            Thank you!     Thank you for choosing [facility]  for your care. Our goal is always to provide you with excellent care. Hearing back from our patients is one way we can continue to improve our services. Please take a few minutes to complete the written survey that you may receive in the mail after your visit with us. Thank you!             Your Updated Medication List - Protect others around you: Learn how to safely use, store and throw away your medicines at www.disposemymeds.org.          This list is accurate as of: 1/10/18 11:59 PM.  Always use your most recent med list.                   Brand Name Dispense Instructions for use Diagnosis    BENADRYL ALLERGY 25 MG tablet   Generic drug:  diphenhydrAMINE     56 tablet    Take 25 mg by mouth At Bedtime        chlorpheniramine 4 MG tablet    CHLOR-TRIMETON     Take 4 mg by mouth every 6 hours as  needed. For head cold        COMPRESSION STOCKINGS     2 each    Wear compression stockings at 20-30 mmHg rating most time during the day to the affected leg (left leg) or both legs. Take them off at night.    DVT (deep venous thrombosis), left, Postphlebitic syndrome       eltrombopag 50 MG tablet    PROMACTA    90 tablet    Take 3 tablets (150 mg) by mouth daily Administer on an empty stomach, 1 hour before or 2 hours after a meal. Or as directed    Idiopathic aplastic anemia (H), Pancytopenia (H)       loratadine 10 MG tablet    CLARITIN     Take 10 mg by mouth daily as needed Reported on 4/26/2017        order for DME     1 Box    Equipment being ordered: knee high compression stockings- 18-20 mm    DVT prophylaxis       pantoprazole 40 MG EC tablet    PROTONIX     Take 1 tablet (40 mg) by mouth daily    Gastroesophageal reflux disease without esophagitis, Left-sided low back pain without sciatica, unspecified chronicity, Constipation, unspecified constipation type, Hemorrhoids, unspecified hemorrhoid type, Idiopathic aplastic anemia (H), Pancytopenia (H)       TYLENOL PO      Take 325 mg by mouth every 6 hours as needed for mild pain or fever

## 2018-01-10 NOTE — NURSING NOTE
Chief Complaint   Patient presents with     Blood Draw     VS done, labs collected via venipuncture by RN.  Hx aplastic anemia.

## 2018-01-11 DIAGNOSIS — K64.9 HEMORRHOIDS, UNSPECIFIED HEMORRHOID TYPE: ICD-10-CM

## 2018-01-11 DIAGNOSIS — K59.00 CONSTIPATION, UNSPECIFIED CONSTIPATION TYPE: ICD-10-CM

## 2018-01-11 DIAGNOSIS — D61.9 APLASTIC ANEMIA (H): ICD-10-CM

## 2018-01-11 DIAGNOSIS — K21.9 GASTROESOPHAGEAL REFLUX DISEASE WITHOUT ESOPHAGITIS: ICD-10-CM

## 2018-01-11 DIAGNOSIS — M54.50 LEFT-SIDED LOW BACK PAIN WITHOUT SCIATICA, UNSPECIFIED CHRONICITY: ICD-10-CM

## 2018-01-11 RX ORDER — CYCLOSPORINE 25 MG/1
25 CAPSULE, LIQUID FILLED ORAL 2 TIMES DAILY
Qty: 540 CAPSULE | COMMUNITY
Start: 2018-01-11 | End: 2018-02-15

## 2018-01-11 RX ORDER — CYCLOSPORINE 100 MG/1
100 CAPSULE, LIQUID FILLED ORAL 2 TIMES DAILY
Qty: 120 CAPSULE | Refills: 3 | COMMUNITY
Start: 2018-01-11 | End: 2018-04-05

## 2018-01-11 NOTE — PROGRESS NOTES
Counts stable but creatinine up a bit.  INR normal    Called patient to reduce Cyclosporine dose to 100 mg twice daily.    Also, set up for dental extraction Jan 25.  Will go to [facility] for infusion of platelets in the AM. Then to oral surgeon for dental extraction.    If bleeding is persistent and oral surgeon thinks its not just from the local trauma; then she can return to [facility] for a 2nd dose of plts in the afternoon.    Orders in and patient informed of this plan.    Rafita Palmer [facility]      Results for LISSETH PARIKH (MRN [id number]) as of 1/11/2018 15:50   Ref. Range 1/10/2018 10:13   Sodium Latest Ref Range: 133 - 144 mmol/L 142   Potassium Latest Ref Range: 3.4 - 5.3 mmol/L 4.0   Chloride Latest Ref Range: 94 - 109 mmol/L 109   Carbon Dioxide Latest Ref Range: 20 - 32 mmol/L 24   Urea Nitrogen Latest Ref Range: 7 - 30 mg/dL 37 (H)   Creatinine Latest Ref Range: 0.52 - 1.04 mg/dL 1.71 (H)   GFR Estimate Latest Ref Range: >60 mL/min/1.7m2 29 (L)   GFR Estimate If Black Latest Ref Range: >60 mL/min/1.7m2 35 (L)   Calcium Latest Ref Range: 8.5 - 10.1 mg/dL 8.7   Anion Gap Latest Ref Range: 3 - 14 mmol/L 8   Albumin Latest Ref Range: 3.4 - 5.0 g/dL 3.5   Protein Total Latest Ref Range: 6.8 - 8.8 g/dL 7.7   Bilirubin Total Latest Ref Range: 0.2 - 1.3 mg/dL 1.1   Alkaline Phosphatase Latest Ref Range: 40 - 150 U/L 81   ALT Latest Ref Range: 0 - 50 U/L 11   AST Latest Ref Range: 0 - 45 U/L 13   Glucose Latest Ref Range: 70 - 99 mg/dL 92   WBC Latest Ref Range: 4.0 - 11.0 10e9/L 2.9 (L)   Hemoglobin Latest Ref Range: 11.7 - 15.7 g/dL 8.8 (L)   Hematocrit Latest Ref Range: 35.0 - 47.0 % 27.1 (L)   Platelet Count Latest Ref Range: 150 - 450 10e9/L 51 (L)   RBC Count Latest Ref Range: 3.8 - 5.2 10e12/L 2.25 (L)   MCV Latest Ref Range: 78 - 100 fl 120 (H)   MCH Latest Ref Range: 26.5 - 33.0 pg 39.1 (H)   MCHC Latest Ref Range: 31.5 - 36.5 g/dL 32.5   RDW Latest Ref Range: 10.0 - 15.0 % 14.9    Diff Method Unknown Automated Method   % Neutrophils Latest Units: % 51.6   % Lymphocytes Latest Units: % 36.1   % Monocytes Latest Units: % 8.4   % Eosinophils Latest Units: % 3.9   % Basophils Latest Units: % 0.0   % Immature Granulocytes Latest Units: % 0.0   Nucleated RBCs Latest Ref Range: 0 /100 0   Absolute Neutrophil Latest Ref Range: 1.6 - 8.3 10e9/L 1.5 (L)   Absolute Lymphocytes Latest Ref Range: 0.8 - 5.3 10e9/L 1.0   Absolute Monocytes Latest Ref Range: 0.0 - 1.3 10e9/L 0.2   Absolute Eosinophils Latest Ref Range: 0.0 - 0.7 10e9/L 0.1   Absolute Basophils Latest Ref Range: 0.0 - 0.2 10e9/L 0.0   Abs Immature Granulocytes Latest Ref Range: 0 - 0.4 10e9/L 0.0   Absolute Nucleated RBC Unknown 0.0   % Retic Latest Ref Range: 0.5 - 2.0 % 2.8 (H)   Absolute Retic Latest Ref Range: 25 - 95 10e9/L 63.0   INR Latest Ref Range: 0.86 - 1.14  1.01   ABO Unknown A   RH(D) Unknown Pos   Antibody Screen Unknown Neg   Test Valid Only At Latest Units:     [facility].   Specimen Expires Unknown 01/13/2018   Cyclosporine Last Dose Unknown LAST DOSE 1/9@2230   Cyclosporine Level Latest Ref Range: 50 - 400 ug/L 152

## 2018-01-17 NOTE — PROGRESS NOTES
[facility] CANCER CLINIC  FOLLOW-UP VISIT NOTE  Date of visit: Benton 10, 2018            REASON FOR VISIT: Aplastic anemia, routine 4 week follow up    HPI: Bonny is a 71 year old female who presented in the fall of 2016 with fatigue and shortness of breath. 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  Her counts showed pancytopenia and BMBX was concerning for aplastic anemia.  By December of 2016 she was transfusion dependent with platelets under 10 k and ANC dropped under 500. 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  She has met with Dr Mcdaniel at [facility] and consulted with Dr Rogel at [facility].      After further consultation it was decided to admit on 1/9/17 for ATG/cyclosporine/prednisone therapy.     The following was her inpatient regimen:  Day 1= 1/9/17  ATG 2500 mg (40 mg/kg0 q24 hours D-4)  Cyclosporine 200 mg (3 mg/kg) PO bid  Eltrombopag 50 mg daily  Methylpred 31 mg (0.5 mg/kg) q24 hours D1-4  Prednisone 60 mg (1 mg/kg) daily after completion of IV methylpred to continue for at least 2 weeks    Bonny also had a admission 1/23-1/25/17 for rectal bleeding (presumed hemorrhoidal). Otherwise has done well with no other complications requiring hospitalization.     INTERVAL HISTORY: Bonny is here today for her q4w follow up. Bonny continues to make some good strides of improvement.  Her unsteadiness, back pain and stamina are better with PT- she is using kinesiology tape and a soft binder which both seem to really help her back pain. She has completed PT with attaining her goals of improved ambulation, she is really glad she did PT.  She feels her dysphagia is better and that she is eating more- weight is still not as high as she would like it to be, but at least it is stable.      No fevers, chest pain and and no bleeding. Daily BM, no black/blood. No  issues. She has a tooth that needs to be pulled and then she will need an  implant placed.  She has osteoporosis and needs to get on a bisphosphonate at some point, but that will have to be put off with the dental issues.     ROS: complete review of systems was performed which was negative unless noted above.    EXAM:  /82  Pulse 94  Temp 98  F (36.7  C)  Resp 16  Wt 52.4 kg (115 lb 8 oz)  SpO2 98%  BMI 21.12 kg/m2  Wt Readings from Last 4 Encounters:   01/10/18 52.4 kg (115 lb 8 oz)   12/14/17 52.9 kg (116 lb 11.2 oz)   11/15/17 52.2 kg (115 lb 1.6 oz)   10/12/17 52.3 kg (115 lb 6.4 oz)     General: Patient alert and oriented x3.  She looks better/stronger better skin coloring than I have seen her in a while.   EYES: PERRLA, conjunctiva pink, sclera is jaundice.   Neck: No palpable cervical, supraclavicular or axillary nodes bilaterally.   Cardiovascular: RRR, no murmurs, gallops or rubs  Respiratory: clear to auscultation bilaterally;  no crackles, rhonchi or wheezing.   Abdomen: positive bowel sounds in all four quadrants, soft, nontender  Skin: light jaundice, intact  Neuro: grossly intact.   Mood and affect is stable.       LABS:      11/15/2017 10:22 12/14/2017 16:09 1/10/2018 10:13   WBC 2.8 (L) 3.2 (L) 2.9 (L)   Hemoglobin 8.4 (L) 8.0 (L) 8.8 (L)   Hematocrit 25.7 (L) 24.3 (L) 27.1 (L)   Platelet Count 49 (LL) 45 (LL) 51 (L)   RBC Count 2.12 (L) 2.02 (L) 2.25 (L)    (H) 120 (H) 120 (H)   MCH 39.6 (H) 39.6 (H) 39.1 (H)   MCHC 32.7 32.9 32.5   RDW 14.1 14.7 14.9   Diff Method Automated Method Automated Method Automated Method   % Neutrophils 49.1 44.3 51.6   % Lymphocytes 39.5 46.9 36.1   % Monocytes 8.5 7.9 8.4   % Eosinophils 2.1 0.9 3.9   % Basophils 0.4 0.0 0.0   % Immature Granulocytes 0.4 0.0 0.0   Nucleated RBCs 0 0 0   Absolute Neutrophil 1.4 (L) 1.4 (L) 1.5 (L)      11/15/2017 10:22 12/14/2017 16:09 1/10/2018 10:13   Sodium 139 140 142   Potassium 4.0 4.5 4.0   Chloride 108 110 (H) 109   Carbon Dioxide 23 24 24   Urea Nitrogen 39 (H) 41 (H) 37 (H)    Creatinine 1.38 (H) 1.50 (H) 1.71 (H)   GFR Estimate 37 (L) 34 (L) 29 (L)   GFR Estimate If Black 45 (L) 41 (L) 35 (L)   Calcium 8.6 8.5 8.7   Anion Gap 7 6 8   Albumin 3.4 3.6 3.5   Protein Total 7.3 7.2 7.7   Bilirubin Total 1.5 (H) 1.4 (H) 1.1   Alkaline Phosphatase 79 80 81   ALT 22 21 11   AST 14 16 13   Glucose 88 116 (H) 92       ASSESSMENT/PLAN: 71 year old female with AA, now has been transfusion INDEPENDENT since April 2017, still dealing with fatigue, OCASIO and low stamina but improving monthly.    HEME- Treatment for AA 1/9-1/13/17 with ATG, methylpred, cyclosporine and eltrombopag. Has been off her prednisone and ppx medications since spring.  Her stamina has improved since I saw her last and counts are now independent from transfusions since April of 2017.   -cyclosporine 125 mg in the AM and 100 mg in the PM  -eltrombopag 150 mg daily  -counts  q4 w  -transfuse if hgb <7.5 or symptomatic and platelets <30k (or prior to dental issues)  -q4-6 weeks for labs/visit    Renal: Creat continues to increase.  She admits she is not drinking much more than ~4 cups fluids daily.  She will work on better hydration and we'll wait for CSA level to return.     CV-two prior provoked DVT in 2012 and 2014 with travel. 2012 was associated with PE. Was on warfarin/lovenox in the past.  Transitioned to ASA which has been held in the setting of low platelets.  Off all BP medications    ID-  No fever or symptoms of infection. No ppx medications   - CMV was neg  - no more pentamidine    MSK- low back -Low thoracic mid spine pain which has significantly improved.  Has h/o compression fractures.  Reviewed bone scan showing osteoporosis.  Currently, she needs some dental extractions, so we will HOLD on any bisphosphonate until the dental work is clearly done and healed.      FEN: bobby improved, weight stable.      Sejal Walls PA-C      AMEND: CSA level is 150.  Will review with Dr Rogel. LGR

## 2018-01-23 DIAGNOSIS — D64.9 ANEMIA: Primary | ICD-10-CM

## 2018-01-25 ENCOUNTER — INFUSION THERAPY VISIT (OUTPATIENT)
Dept: INFUSION THERAPY | Facility: CLINIC | Age: 73
End: 2018-01-25
Attending: INTERNAL MEDICINE
Payer: COMMERCIAL

## 2018-01-25 ENCOUNTER — HOSPITAL ENCOUNTER (OUTPATIENT)
Facility: CLINIC | Age: 73
Setting detail: SPECIMEN
Discharge: HOME | End: 2018-01-25
Attending: INTERNAL MEDICINE | Admitting: INTERNAL MEDICINE
Payer: COMMERCIAL

## 2018-01-25 VITALS
RESPIRATION RATE: 16 BRPM | DIASTOLIC BLOOD PRESSURE: 80 MMHG | SYSTOLIC BLOOD PRESSURE: 141 MMHG | OXYGEN SATURATION: 93 % | TEMPERATURE: 98 F | HEART RATE: 68 BPM

## 2018-01-25 DIAGNOSIS — D61.9 APLASTIC ANEMIA (H): ICD-10-CM

## 2018-01-25 DIAGNOSIS — D64.9 ANEMIA: ICD-10-CM

## 2018-01-25 DIAGNOSIS — D61.818 PANCYTOPENIA (H): Primary | ICD-10-CM

## 2018-01-25 LAB
ALBUMIN SERPL-MCNC: 3.6 G/DL (ref 3.4–5)
ALBUMIN UR-MCNC: NEGATIVE MG/DL
ALP SERPL-CCNC: 84 U/L (ref 40–150)
ALT SERPL W P-5'-P-CCNC: 18 U/L (ref 0–50)
ANION GAP SERPL CALCULATED.3IONS-SCNC: 8 MMOL/L (ref 3–14)
APPEARANCE UR: CLEAR
AST SERPL W P-5'-P-CCNC: 13 U/L (ref 0–45)
BASOPHILS # BLD AUTO: 0 10E9/L (ref 0–0.2)
BASOPHILS NFR BLD AUTO: 0.3 %
BILIRUB SERPL-MCNC: 0.9 MG/DL (ref 0.2–1.3)
BILIRUB UR QL STRIP: NEGATIVE
BLD PROD TYP BPU: NORMAL
BLD UNIT ID BPU: 0
BLOOD PRODUCT CODE: NORMAL
BPU ID: NORMAL
BUN SERPL-MCNC: 39 MG/DL (ref 7–30)
CALCIUM SERPL-MCNC: 9 MG/DL (ref 8.5–10.1)
CHLORIDE SERPL-SCNC: 109 MMOL/L (ref 94–109)
CO2 SERPL-SCNC: 24 MMOL/L (ref 20–32)
COLOR UR AUTO: YELLOW
CREAT SERPL-MCNC: 1.55 MG/DL (ref 0.52–1.04)
DIFFERENTIAL METHOD BLD: ABNORMAL
EOSINOPHIL # BLD AUTO: 0.2 10E9/L (ref 0–0.7)
EOSINOPHIL NFR BLD AUTO: 7.5 %
ERYTHROCYTE [DISTWIDTH] IN BLOOD BY AUTOMATED COUNT: 14.8 % (ref 10–15)
GFR SERPL CREATININE-BSD FRML MDRD: 33 ML/MIN/1.7M2
GLUCOSE SERPL-MCNC: 69 MG/DL (ref 70–99)
GLUCOSE UR STRIP-MCNC: NEGATIVE MG/DL
HCT VFR BLD AUTO: 27.6 % (ref 35–47)
HGB BLD-MCNC: 9.1 G/DL (ref 11.7–15.7)
HGB UR QL STRIP: NEGATIVE
IMM GRANULOCYTES # BLD: 0 10E9/L (ref 0–0.4)
IMM GRANULOCYTES NFR BLD: 0 %
KETONES UR STRIP-MCNC: NEGATIVE MG/DL
LEUKOCYTE ESTERASE UR QL STRIP: ABNORMAL
LYMPHOCYTES # BLD AUTO: 1.3 10E9/L (ref 0.8–5.3)
LYMPHOCYTES NFR BLD AUTO: 41.2 %
MCH RBC QN AUTO: 38.4 PG (ref 26.5–33)
MCHC RBC AUTO-ENTMCNC: 33 G/DL (ref 31.5–36.5)
MCV RBC AUTO: 117 FL (ref 78–100)
MONOCYTES # BLD AUTO: 0.2 10E9/L (ref 0–1.3)
MONOCYTES NFR BLD AUTO: 7.2 %
MUCOUS THREADS #/AREA URNS LPF: PRESENT /LPF
NEUTROPHILS # BLD AUTO: 1.4 10E9/L (ref 1.6–8.3)
NEUTROPHILS NFR BLD AUTO: 43.8 %
NITRATE UR QL: NEGATIVE
NRBC # BLD AUTO: 0 10*3/UL
NRBC BLD AUTO-RTO: 0 /100
PH UR STRIP: 5 PH (ref 5–7)
PLATELET # BLD AUTO: 52 10E9/L (ref 150–450)
POTASSIUM SERPL-SCNC: 4.1 MMOL/L (ref 3.4–5.3)
PROT SERPL-MCNC: 7.6 G/DL (ref 6.8–8.8)
RBC # BLD AUTO: 2.37 10E12/L (ref 3.8–5.2)
RBC #/AREA URNS AUTO: 1 /HPF (ref 0–2)
SODIUM SERPL-SCNC: 141 MMOL/L (ref 133–144)
SOURCE: ABNORMAL
SP GR UR STRIP: 1.01 (ref 1–1.03)
SQUAMOUS #/AREA URNS AUTO: 1 /HPF (ref 0–1)
TRANSFUSION STATUS PATIENT QL: NORMAL
TRANSFUSION STATUS PATIENT QL: NORMAL
UROBILINOGEN UR STRIP-MCNC: NORMAL MG/DL (ref 0–2)
WBC # BLD AUTO: 3.2 10E9/L (ref 4–11)
WBC #/AREA URNS AUTO: 3 /HPF (ref 0–2)

## 2018-01-25 PROCEDURE — 81001 URINALYSIS AUTO W/SCOPE: CPT | Performed by: PHYSICIAN ASSISTANT

## 2018-01-25 PROCEDURE — P9037 PLATE PHERES LEUKOREDU IRRAD: HCPCS | Performed by: INTERNAL MEDICINE

## 2018-01-25 PROCEDURE — 85025 COMPLETE CBC W/AUTO DIFF WBC: CPT | Performed by: INTERNAL MEDICINE

## 2018-01-25 PROCEDURE — 80053 COMPREHEN METABOLIC PANEL: CPT | Performed by: INTERNAL MEDICINE

## 2018-01-25 PROCEDURE — 36430 TRANSFUSION BLD/BLD COMPNT: CPT

## 2018-01-25 NOTE — MR AVS SNAPSHOT
After Visit Summary   1/25/2018    Bonny Raymundo    MRN: [id number]           Patient Information     Date Of Birth          1945        Visit Information        Provider Department      1/25/2018 9:30 AM  INFUSION CHAIR 17 [facility]        Today's Diagnoses     Pancytopenia (H)    -  1    Anemia        Aplastic anemia (H)           Follow-ups after your visit        Your next 10 appointments already scheduled     Feb 07, 2018  1:30 PM CST   Hearing Aid Consult with Kamilah Cruz [facility] Audiology ([facility])    [address] [place] [address]   [phone]            Feb 15, 2018  4:00 PM CST   Masonic Lab Draw with  MASONIC LAB DRAW   [facility] Masonic Lab Draw ([facility])    [address] [place] [address]  [address]  [facility] [address] [phone]            Feb 15, 2018  4:30 PM CST   RETURN ONC with Rafita Rogel MD   [facility] Blood and Marrow Transplant ([facility])    [address] [place] [address]  [address] [place] [address]   [phone]            Mar 01, 2018  1:00 PM CST   Hearing Aid Fitting with Kamilah Grey [facility] ([facility])    [address] [place] [address]   [phone]            Apr 12, 2018  1:00 PM CDT   (Arrive by 12:45 PM)   Initial Review Program with Kamilah Grey [facility]iology ([facility])    [address] [place] [address]   [phone]              Who to contact     If you have questions or need follow up information about today's clinic visit or your schedule please contact [facility] directly at [phone].  Normal or non-critical lab and imaging results will be communicated to you by MyChart, letter or phone within 4  "business days after the clinic has received the results. If you do not hear from us within 7 days, please contact the clinic through [employer] or phone. If you have a critical or abnormal lab result, we will notify you by phone as soon as possible.  Submit refill requests through [employer] or call your pharmacy and they will forward the refill request to us. Please allow 3 business days for your refill to be completed.          Additional Information About Your Visit        [employer] Information     [employer] lets you send messages to your doctor, view your test results, renew your prescriptions, schedule appointments and more. To sign up, go to [email or web address] . Click on \"Log in\" on the left side of the screen, which will take you to the Welcome page. Then click on \"Sign up Now\" on the right side of the page.     You will be asked to enter the access code listed below, as well as some personal information. Please follow the directions to create your username and password.     Your access code is: [email or web address]  Expires: 3/27/2018  6:31 AM     Your access code will  in 90 days. If you need help or a new code, please call your [place] clinic or [phone].        Care EveryWhere ID     This is your Care EveryWhere ID. This could be used by other organizations to access your [place] medical records  [id number]        Your Vitals Were     Pulse Temperature Respirations Pulse Oximetry          68 98  F (36.7  C) 16 93%         Blood Pressure from Last 3 Encounters:   18 141/80   01/10/18 139/82   17 143/83    Weight from Last 3 Encounters:   01/10/18 52.4 kg (115 lb 8 oz)   17 52.9 kg (116 lb 11.2 oz)   11/15/17 52.2 kg (115 lb 1.6 oz)              We Performed the Following     Blood component     CBC with platelets differential     Comprehensive metabolic panel     Platelets prepare order unit     Routine UA with micro reflex to culture     Transfuse platelets unit        Primary Care " Provider Office Phone # Fax #    Shivani Phelps PA-C [phone] [address] [facility] [address]  [facility] [address]        Equal Access to Services     CHARLES NOLASCO : Hadii aad ku hadlatishao Soomaali, waaxda luqadaha, qaybta kaalmada adeegyada, ambika thompsonn dana luziliana levine. So [facility] [phone].    ATENCIÓN: Si habla español, tiene a robertson disposición servicios gratuitos de asistencia lingüística. Llame al [phone].    We comply with applicable federal civil rights laws and [place] laws. We do not discriminate on the basis of race, color, national origin, age, disability, sex, sexual orientation, or gender identity.            Thank you!     Thank you for choosing [facility] CANCER [facility] C[facility]  for your care. Our goal is always to provide you with excellent care. Hearing back from our patients is one way we can continue to improve our services. Please take a few minutes to complete the written survey that you may receive in the mail after your visit with us. Thank you!             Your Updated Medication List - Protect others around you: Learn how to safely use, store and throw away your medicines at www.disposemymeds.org.          This list is accurate as of 1/25/18  3:23 PM.  Always use your most recent med list.                   Brand Name Dispense Instructions for use Diagnosis    BENADRYL ALLERGY 25 MG tablet   Generic drug:  diphenhydrAMINE     56 tablet    Take 25 mg by mouth At Bedtime        chlorpheniramine 4 MG tablet    CHLOR-TRIMETON     Take 4 mg by mouth every 6 hours as needed. For head cold        COMPRESSION STOCKINGS     2 each    Wear compression stockings at 20-30 mmHg rating most time during the day to the affected leg (left leg) or both legs. Take them off at night.    DVT (deep venous thrombosis), left, Postphlebitic syndrome       * cycloSPORINE modified 100 MG capsule    GENERIC EQUIVALENT    120 capsule    Take 1 capsule (100 mg) by mouth 2 times  daily as of Jan 11, 2018    Gastroesophageal reflux disease without esophagitis, Left-sided low back pain without sciatica, unspecified chronicity, Constipation, unspecified constipation type, Hemorrhoids, unspecified hemorrhoid type       * cycloSPORINE modified 25 MG capsule    GENERIC EQUIVALENT    540 capsule    Take 1 capsule (25 mg) by mouth 2 times daily 100 mg twice daily as of Jan 11,. 2018 or as directed    Aplastic anemia (H)       eltrombopag 50 MG tablet    PROMACTA    90 tablet    Take 3 tablets (150 mg) by mouth daily Administer on an empty stomach, 1 hour before or 2 hours after a meal. Or as directed    Idiopathic aplastic anemia (H), Pancytopenia (H)       loratadine 10 MG tablet    CLARITIN     Take 10 mg by mouth daily as needed Reported on 4/26/2017        order for DME     1 Box    Equipment being ordered: knee high compression stockings- 18-20 mm    DVT prophylaxis       pantoprazole 40 MG EC tablet    PROTONIX     Take 1 tablet (40 mg) by mouth daily    Gastroesophageal reflux disease without esophagitis, Left-sided low back pain without sciatica, unspecified chronicity, Constipation, unspecified constipation type, Hemorrhoids, unspecified hemorrhoid type, Idiopathic aplastic anemia (H), Pancytopenia (H)       TYLENOL PO      Take 325 mg by mouth every 6 hours as needed for mild pain or fever        * Notice:  This list has 2 medication(s) that are the same as other medications prescribed for you. Read the directions carefully, and ask your doctor or other care provider to review them with you.

## 2018-01-25 NOTE — PROGRESS NOTES
Infusion Nursing Note:  Bonny Raymundo presents today for 1 unit plts; CBC.    Patient seen by provider today: No   present during visit today: Not Applicable.    Note: N/A.    Intravenous Access:  Peripheral IV placed.    Treatment Conditions:  Blood transfusion consent signed today.      Post Infusion Assessment:  Patient tolerated infusion without incident.  Blood return noted pre and post infusion.  Site patent and intact, free from redness, edema or discomfort.  No evidence of extravasations.  Access discontinued per protocol.    Discharge Plan:   Discharge instructions reviewed with: Patient.  Patient and/or family verbalized understanding of discharge instructions and all questions answered.  Copy of AVS reviewed with patient and/or family.  Patient may return today after dental appt ( SEE orders).Patient discharged in stable condition accompanied by: self.  Departure Mode: Ambulatory.    ANABELLE Angel RN    3036  Spoke with Dr. Micheal Hollis.  He stated that Bonny's procedure went well and hemostasis had been achieved by 10 minutes after procedure.  Bonny was discharged home with instructions to call his office if she had any concerns.  He also stated he will be calling Bonny at 6pm tonight to check on her.  Too Singh RN'

## 2018-01-26 ENCOUNTER — CARE COORDINATION (OUTPATIENT)
Dept: ONCOLOGY | Facility: CLINIC | Age: 73
End: 2018-01-26

## 2018-01-26 NOTE — PROGRESS NOTES
Reason for Outgoing call:    Called patient this afternoon per the request of Sejal Walls PA-C to inform her that her creatinine was better and that her UA was normal. For now per Sejal, she should make no changes to her medications and keep pushing hydration and we will recheck again when she comes to see Dr. Rogel in February.     Patients Questions/Concerns:    Patient stated that she had see the results and was pleased that the labs had improved.     Nursing Action/Patient Instruction:    Encourage patient to call if she had any questions, comments, or concerns.     Patient Response/Evaluation:     Patient verbalized understanding and was grateful for the follow up response.

## 2018-01-26 NOTE — PROGRESS NOTES
Reason for Outgoing call:          Patients Questions/Concerns:        Nursing Action/Patient Instruction:        Patient Response/Evaluation:

## 2018-02-07 ENCOUNTER — OFFICE VISIT (OUTPATIENT)
Dept: AUDIOLOGY | Facility: CLINIC | Age: 73
End: 2018-02-07
Payer: COMMERCIAL

## 2018-02-07 DIAGNOSIS — H93.13 TINNITUS OF BOTH EARS: ICD-10-CM

## 2018-02-07 DIAGNOSIS — H90.6 MIXED HEARING LOSS, BILATERAL: Primary | ICD-10-CM

## 2018-02-07 NOTE — MR AVS SNAPSHOT
After Visit Summary   2/7/2018    Bonny Raymundo    MRN: [id number]           Patient Information     Date Of Birth          1945        Visit Information        Provider Department      2/7/2018 1:30 PM Cassia Lewis AuD [facility] Audiology        Today's Diagnoses     Mixed hearing loss, bilateral    -  1    Tinnitus of both ears           Follow-ups after your visit        Your next 10 appointments already scheduled     Feb 15, 2018  4:00 PM CST   Masonic Lab Draw with  MASONIC LAB DRAW   [facility] Masonic Lab Draw ([facility])    [address] [place] [address]  [address] [place] [address] [phone]            Feb 15, 2018  4:30 PM CST   RETURN ONC with Rafita Rogel MD   [facility] Blood and Marrow Transplant ([facility])    [address] [place] [address]  [address] [place] [id number] [phone]            Mar 01, 2018  1:00 PM CST   Hearing Aid Fitting with Kamilah Grey [facility]iology ([facility])    [address] [place] [id number] [phone]            Apr 12, 2018  1:00 PM CDT   (Arrive by 12:45 PM)   Initial Review Program with Kamilah Grey [facility]iology ([facility])    [address] [place] [id number] [phone]              Who to contact     Please call your clinic at [phone] to:    Ask questions about your health    Make or cancel appointments    Discuss your medicines    Learn about your test results    Speak to your doctor   If you have compliments or concerns about an experience at your clinic, or if you wish to file a complaint, please contact [facility] Patient Relations at [phone] or email us at [email or web address]         Additional Information About Your Visit        MyChart Information     MyChart is an electronic  gateway that provides easy, online access to your medical records. With [employer], you can request a clinic appointment, read your test results, renew a prescription or communicate with your care team.     To sign up for [employer] visit the website at [email or web address]   You will be asked to enter the access code listed below, as well as some personal information. Please follow the directions to create your username and password.     Your access code is: [email or web address]  Expires: 3/27/2018  6:31 AM     Your access code will  in 90 days. If you need help or a new code, please contact your [facility] or call [phone] for assistance.        Care EveryWhere ID     This is your Care EveryWhere ID. This could be used by other organizations to access your [place] medical records  [id number]         Blood Pressure from Last 3 Encounters:   18 141/80   01/10/18 139/82   17 143/83    Weight from Last 3 Encounters:   01/10/18 52.4 kg (115 lb 8 oz)   17 52.9 kg (116 lb 11.2 oz)   11/15/17 52.2 kg (115 lb 1.6 oz)              We Performed the Following     Hearing Aid Exam, Binaural ([address])        Primary Care Provider Office Phone # Fax #    Shivani Phelps PA-C [phone] [address]  [place] [address]        Equal Access to Services     [facility] : Hadii aad ku hadasho Soomaali, waaxda luqadaha, qaybta kaalmada adeegyada, waxay idiin hayiliana strong . So [facility] [phone].    ATENCIÓN: Si habla clarence, tiene a robertson disposición servicios gratuitos de asistencia lingüística. Llame al [phone].    We comply with applicable federal civil rights laws and [place] laws. We do not discriminate on the basis of race, color, national origin, age, disability, sex, sexual orientation, or gender identity.            Thank you!     Thank you for choosing [facility]  for your care. Our goal is always to provide you  with excellent care. Hearing back from our patients is one way we can continue to improve our services. Please take a few minutes to complete the written survey that you may receive in the mail after your visit with us. Thank you!             Your Updated Medication List - Protect others around you: Learn how to safely use, store and throw away your medicines at www.disposemymeds.org.          This list is accurate as of 2/7/18  4:21 PM.  Always use your most recent med list.                   Brand Name Dispense Instructions for use Diagnosis    BENADRYL ALLERGY 25 MG tablet   Generic drug:  diphenhydrAMINE     56 tablet    Take 25 mg by mouth At Bedtime        chlorpheniramine 4 MG tablet    CHLOR-TRIMETON     Take 4 mg by mouth every 6 hours as needed. For head cold        COMPRESSION STOCKINGS     2 each    Wear compression stockings at 20-30 mmHg rating most time during the day to the affected leg (left leg) or both legs. Take them off at night.    DVT (deep venous thrombosis), left, Postphlebitic syndrome       * cycloSPORINE modified 100 MG capsule    GENERIC EQUIVALENT    120 capsule    Take 1 capsule (100 mg) by mouth 2 times daily as of Jan 11, 2018    Gastroesophageal reflux disease without esophagitis, Left-sided low back pain without sciatica, unspecified chronicity, Constipation, unspecified constipation type, Hemorrhoids, unspecified hemorrhoid type       * cycloSPORINE modified 25 MG capsule    GENERIC EQUIVALENT    540 capsule    Take 1 capsule (25 mg) by mouth 2 times daily 100 mg twice daily as of Jan 11,. 2018 or as directed    Aplastic anemia (H)       eltrombopag 50 MG tablet    PROMACTA    90 tablet    Take 3 tablets (150 mg) by mouth daily Administer on an empty stomach, 1 hour before or 2 hours after a meal. Or as directed    Idiopathic aplastic anemia (H), Pancytopenia (H)       loratadine 10 MG tablet    CLARITIN     Take 10 mg by mouth daily as needed Reported on 4/26/2017        order  for DME     1 Box    Equipment being ordered: knee high compression stockings- 18-20 mm    DVT prophylaxis       pantoprazole 40 MG EC tablet    PROTONIX     Take 1 tablet (40 mg) by mouth daily    Gastroesophageal reflux disease without esophagitis, Left-sided low back pain without sciatica, unspecified chronicity, Constipation, unspecified constipation type, Hemorrhoids, unspecified hemorrhoid type, Idiopathic aplastic anemia (H), Pancytopenia (H)       TYLENOL PO      Take 325 mg by mouth every 6 hours as needed for mild pain or fever        * Notice:  This list has 2 medication(s) that are the same as other medications prescribed for you. Read the directions carefully, and ask your doctor or other care provider to review them with you.

## 2018-02-07 NOTE — PROGRESS NOTES
AUDIOLOGY REPORT    SUBJECTIVE: Bonny Raymundo is a 72 year old female was seen in the Audiology Clinic at  [facility] on 2/07/18 to discuss concerns with hearing and functional communication difficulties. Bonny has been seen previously on 8/22/2017 at an outside clinic ([facility]), and results revealed a bilateral mixed hearing loss.  She has a history of PE tubes but reports that her ears are normally dry and she does not experience drainage. Bonny notes difficulty with communication in a variety of listening situations.    The insurance benefit check was discussed, it was reviewed that we found no coverage for hearing aids.  Patient reported she thought she had a benefit, she was encouraged to call her insurance and verify her hearing aid coverage.    OBJECTIVE:  Patient is a hearing aid candidate. Patient would like to move forward with a hearing aid evaluation today. Therefore, the patient was presented with different options for amplification to help aid in communication. Discussed styles, levels of technology and monaural vs. binaural fitting. Patient was interested in bluetooth technology, although noted she does not experience difficulty hearing on the phone.  She reports difficulty hearing the TV, especially when watching a show where the actors have an accent. She also reports constant bilateral tinnitus.    Patient was unsure which device she would like to try and was quoted the following options.    Binaural: Widex Beyond Fusion 330  COLOR: 081  BATTERY SIZE: 312  EARMOLD/TIPS: Closed domes  CANAL/ LENGTH: 1    The hearing aid(s) mutually chosen were:  Binaural: Phonak Audeo B50-direct  COLOR: P1  BATTERY SIZE: 312  EARMOLD/TIPS: Closed domes  CANAL/ LENGTH: 1    It was reviewed with the patient that she would likely experience better sound quality with custom earmolds. Patient did not want to pursue the custom earmolds at this time as it is  an added expense. It was reviewed that domes could be tried first, but that if they did not allow for enough volume to reach target gain, she would need to change to custom earmolds. She expressed understanding.      ASSESSMENT:     Reviewed purchase information and warranty information with patient. The 45 day trial period was explained to patient. The patient was given a copy of the [place] Department of Health consumer brochure on purchasing hearing instruments. Patient risk factors have been provided to the patient in writing prior to the sale of the hearing aid per FDA regulation. The risk factors are also available in the User Instructional Booklet to be presented on the day of the hearing aid fitting. Patient will contact the clinic with her decision of which hearing aids to trial first. Hearing aid evaluation completed.    PLAN: Patient will contact the clinic in the next week with her device of choice should she decide to proceed with hearing aids in this clinic. Bonny is scheduled to return in 2-3 weeks for a hearing aid fitting and programming. Purchase agreement will be completed on that date. Please contact this clinic with any questions or concerns.      Kamilah Cruz  Audiologist  [place] License  #[id number]

## 2018-02-09 ENCOUNTER — TELEPHONE (OUTPATIENT)
Dept: ONCOLOGY | Facility: CLINIC | Age: 73
End: 2018-02-09

## 2018-02-09 NOTE — TELEPHONE ENCOUNTER
Bonny is approved for free Promacta through the [email or web address] for the remainder of the calendar year.

## 2018-02-15 ENCOUNTER — OFFICE VISIT (OUTPATIENT)
Dept: TRANSPLANT | Facility: CLINIC | Age: 73
End: 2018-02-15
Attending: INTERNAL MEDICINE
Payer: COMMERCIAL

## 2018-02-15 VITALS
OXYGEN SATURATION: 98 % | BODY MASS INDEX: 20.7 KG/M2 | HEART RATE: 83 BPM | RESPIRATION RATE: 16 BRPM | WEIGHT: 113.2 LBS | TEMPERATURE: 98.6 F | DIASTOLIC BLOOD PRESSURE: 73 MMHG | SYSTOLIC BLOOD PRESSURE: 138 MMHG

## 2018-02-15 VITALS — TEMPERATURE: 98 F

## 2018-02-15 DIAGNOSIS — D61.818 PANCYTOPENIA (H): ICD-10-CM

## 2018-02-15 DIAGNOSIS — K64.9 HEMORRHOIDS, UNSPECIFIED HEMORRHOID TYPE: ICD-10-CM

## 2018-02-15 DIAGNOSIS — K21.9 GASTROESOPHAGEAL REFLUX DISEASE WITHOUT ESOPHAGITIS: ICD-10-CM

## 2018-02-15 DIAGNOSIS — K59.00 CONSTIPATION, UNSPECIFIED CONSTIPATION TYPE: ICD-10-CM

## 2018-02-15 DIAGNOSIS — D61.9 APLASTIC ANEMIA (H): ICD-10-CM

## 2018-02-15 DIAGNOSIS — D61.3 IDIOPATHIC APLASTIC ANEMIA (H): ICD-10-CM

## 2018-02-15 DIAGNOSIS — M54.50 LEFT-SIDED LOW BACK PAIN WITHOUT SCIATICA, UNSPECIFIED CHRONICITY: ICD-10-CM

## 2018-02-15 LAB
ALBUMIN SERPL-MCNC: 3.5 G/DL (ref 3.4–5)
ALP SERPL-CCNC: 79 U/L (ref 40–150)
ALT SERPL W P-5'-P-CCNC: 12 U/L (ref 0–50)
ANION GAP SERPL CALCULATED.3IONS-SCNC: 6 MMOL/L (ref 3–14)
AST SERPL W P-5'-P-CCNC: 10 U/L (ref 0–45)
BASOPHILS # BLD AUTO: 0 10E9/L (ref 0–0.2)
BASOPHILS NFR BLD AUTO: 0 %
BILIRUB SERPL-MCNC: 0.9 MG/DL (ref 0.2–1.3)
BUN SERPL-MCNC: 30 MG/DL (ref 7–30)
CALCIUM SERPL-MCNC: 8.9 MG/DL (ref 8.5–10.1)
CHLORIDE SERPL-SCNC: 106 MMOL/L (ref 94–109)
CO2 SERPL-SCNC: 26 MMOL/L (ref 20–32)
CREAT SERPL-MCNC: 1.26 MG/DL (ref 0.52–1.04)
CYCLOSPORINE BLD LC/MS/MS-MCNC: 94 UG/L (ref 50–400)
DIFFERENTIAL METHOD BLD: ABNORMAL
EOSINOPHIL # BLD AUTO: 0.1 10E9/L (ref 0–0.7)
EOSINOPHIL NFR BLD AUTO: 4.4 %
ERYTHROCYTE [DISTWIDTH] IN BLOOD BY AUTOMATED COUNT: 14.9 % (ref 10–15)
GFR SERPL CREATININE-BSD FRML MDRD: 41 ML/MIN/1.7M2
GLUCOSE SERPL-MCNC: 99 MG/DL (ref 70–99)
HCT VFR BLD AUTO: 26.9 % (ref 35–47)
HGB BLD-MCNC: 8.7 G/DL (ref 11.7–15.7)
IMM GRANULOCYTES # BLD: 0 10E9/L (ref 0–0.4)
IMM GRANULOCYTES NFR BLD: 0 %
LYMPHOCYTES # BLD AUTO: 1.3 10E9/L (ref 0.8–5.3)
LYMPHOCYTES NFR BLD AUTO: 47.8 %
MCH RBC QN AUTO: 38.5 PG (ref 26.5–33)
MCHC RBC AUTO-ENTMCNC: 32.3 G/DL (ref 31.5–36.5)
MCV RBC AUTO: 119 FL (ref 78–100)
MONOCYTES # BLD AUTO: 0.2 10E9/L (ref 0–1.3)
MONOCYTES NFR BLD AUTO: 8.4 %
NEUTROPHILS # BLD AUTO: 1.1 10E9/L (ref 1.6–8.3)
NEUTROPHILS NFR BLD AUTO: 39.4 %
NRBC # BLD AUTO: 0 10*3/UL
NRBC BLD AUTO-RTO: 0 /100
PLATELET # BLD AUTO: 41 10E9/L (ref 150–450)
PLATELET # BLD EST: ABNORMAL 10*3/UL
POTASSIUM SERPL-SCNC: 4.6 MMOL/L (ref 3.4–5.3)
PROT SERPL-MCNC: 7.2 G/DL (ref 6.8–8.8)
RBC # BLD AUTO: 2.26 10E12/L (ref 3.8–5.2)
SODIUM SERPL-SCNC: 139 MMOL/L (ref 133–144)
TME LAST DOSE: NORMAL H
WBC # BLD AUTO: 2.7 10E9/L (ref 4–11)

## 2018-02-15 PROCEDURE — G0463 HOSPITAL OUTPT CLINIC VISIT: HCPCS | Mod: ZF

## 2018-02-15 PROCEDURE — 80158 DRUG ASSAY CYCLOSPORINE: CPT | Performed by: PHYSICIAN ASSISTANT

## 2018-02-15 PROCEDURE — 85025 COMPLETE CBC W/AUTO DIFF WBC: CPT | Performed by: PHYSICIAN ASSISTANT

## 2018-02-15 PROCEDURE — 36415 COLL VENOUS BLD VENIPUNCTURE: CPT

## 2018-02-15 PROCEDURE — 80053 COMPREHEN METABOLIC PANEL: CPT | Performed by: PHYSICIAN ASSISTANT

## 2018-02-15 RX ORDER — PANTOPRAZOLE SODIUM 20 MG/1
20 TABLET, DELAYED RELEASE ORAL DAILY
Qty: 30 TABLET | Refills: 3 | Status: SHIPPED | OUTPATIENT
Start: 2018-02-15 | End: 2018-04-05

## 2018-02-15 RX ORDER — CYCLOSPORINE 25 MG/1
25 CAPSULE, LIQUID FILLED ORAL 2 TIMES DAILY
Qty: 540 CAPSULE | COMMUNITY
Start: 2018-02-15 | End: 2018-04-05

## 2018-02-15 ASSESSMENT — PAIN SCALES - GENERAL: PAINLEVEL: NO PAIN (0)

## 2018-02-15 NOTE — NURSING NOTE
"Oncology Rooming Note    February 15, 2018 4:34 PM   Bonny Raymundo is a 72 year old female who presents for:    Chief Complaint   Patient presents with     Blood Draw     Oncology Clinic Visit     APLASTIC ANEMIA     Initial Vitals: There were no vitals taken for this visit. Estimated body mass index is 21.12 kg/(m^2) as calculated from the following:    Height as of 11/15/17: 1.575 m (5' 2.01\").    Weight as of 1/10/18: 52.4 kg (115 lb 8 oz). There is no height or weight on file to calculate BSA.  Data Unavailable Comment: Data Unavailable   No LMP recorded. Patient has had a hysterectomy.  Allergies reviewed: Yes  Medications reviewed: Yes    Medications: Medication refills not needed today.  Pharmacy name entered into [facility]:    [facility], [place] - [address]  [facility], [place] - [address]  [facility], [facility], [address]  WRITTEN PRESCRIPTION REQUESTED    Clinical concerns: Protonix refill requested.    10 minutes for nursing intake (face to face time)     ANTHONY SPRINGER CMA              "

## 2018-02-15 NOTE — NURSING NOTE
Labs completed via  and vitals obtained without complication.    See flowsheets.    Patient was checked into next appt.    Kayla Burger CMA (AAMA)

## 2018-02-15 NOTE — MR AVS SNAPSHOT
After Visit Summary   2/15/2018    Bonny Raymundo    MRN: [id number]           Patient Information     Date Of Birth          1945        Visit Information        Provider Department      2/15/2018 4:30 PM Rafita Rogel MD [facility] Blood and Marrow Transplant        Today's Diagnoses     Aplastic anemia (H)        Gastroesophageal reflux disease without esophagitis        Left-sided low back pain without sciatica, unspecified chronicity        Constipation, unspecified constipation type        Hemorrhoids, unspecified hemorrhoid type        Idiopathic aplastic anemia (H)        Pancytopenia (H)              [facility] [place]  [address] [place] [address]  Phone: [phone]  Clinic Hours:   Monday-Thursday:7am to 7pm   Friday: 7am to 5pm   Weekends and holidays:    8am to noon (in general)  If your fever is 100.5  or greater,   call the clinic.  After hours call the   hospital at [phone] or   [phone]. Ask for the BMT   fellow on-call            Follow-ups after your visit        Follow-up notes from your care team     Return for Physical Exam, Lab Work, Routine Visit.      Your next 10 appointments already scheduled     Mar 01, 2018  1:00 PM CST   Hearing Aid Fitting with Kamilah Grey   [facility] Audiology ([facility])    [address] [place] [address]  4th [facility] [id number] [phone]            Mar 14, 2018 12:00 PM CDT   [facility] Lab Draw with  [facility] LAB DRAW   [facility])    [address] [place] [address]  [address]  [facility] [id number] [phone]            Mar 14, 2018 12:30 PM CDT   (Arrive by 12:15 PM)   Return Visit with Celine Walls PA-C   [facility] ([facility])    [address] [place] [address]  [address]  [facility] [id number] [phone]            Apr 05, 2018  3:30 PM CDT   [employer]  "Lab Draw with  [facility] LAB DRAW   [facility] Lab Draw ([facility])    [address]  [address]  [facility] [id number] [phone]            Apr 05, 2018  4:00 PM CDT   RETURN ONC with Rafita Rogel MD   [facility] Blood and Marrow Transplant ([facility])    [address]  [address]  [facility] [id number] [phone]            Apr 12, 2018  1:00 PM CDT   (Arrive by 12:45 PM)   Initial Review Program with Kamilah Grey   [facility] Audiology ([facility])    [address]  4th Floor  [facility] [address] [phone]              Who to contact     If you have questions or need follow up information about today's clinic visit or your schedule please contact [facility] BLOOD AND MARROW TRANSPLANT directly at [phone].  Normal or non-critical lab and imaging results will be communicated to you by [employer], letter or phone within 4 business days after the clinic has received the results. If you do not hear from us within 7 days, please contact the clinic through [employer] or phone. If you have a critical or abnormal lab result, we will notify you by phone as soon as possible.  Submit refill requests through [employer] or call your pharmacy and they will forward the refill request to us. Please allow 3 business days for your refill to be completed.          Additional Information About Your Visit        Smart [employer] Information     [employer] lets you send messages to your doctor, view your test results, renew your prescriptions, schedule appointments and more. To sign up, go to [email or web address] . Click on \"Log in\" on the left side of the screen, which will take you to the Welcome page. Then click on \"Sign up Now\" on the right side of the page.     You will be asked to enter the access code listed below, as well as some personal information. Please follow the directions to create your " username and password.     Your access code is: [email or web address]  Expires: 3/27/2018  6:31 AM     Your access code will  in 90 days. If you need help or a new code, please call your [facility] clinic or [phone].        Care EveryWhere ID     This is your Care EveryWhere ID. This could be used by other organizations to access your [facility] medical records  [id number]        Your Vitals Were     Pulse Temperature Respirations Pulse Oximetry BMI (Body Mass Index)       83 98.6  F (37  C) (Oral) 16 98% 20.7 kg/m2        Blood Pressure from Last 3 Encounters:   02/15/18 138/73   18 141/80   01/10/18 139/82    Weight from Last 3 Encounters:   02/15/18 51.3 kg (113 lb 3.2 oz)   01/10/18 52.4 kg (115 lb 8 oz)   17 52.9 kg (116 lb 11.2 oz)              We Performed the Following     CBC with platelets differential     Comprehensive metabolic panel     Cyclosporine          Today's Medication Changes          These changes are accurate as of 2/15/18  5:24 PM.  If you have any questions, ask your nurse or doctor.               These medicines have changed or have updated prescriptions.        Dose/Directions    pantoprazole 20 MG EC tablet   Commonly known as:  PROTONIX   This may have changed:    - medication strength  - how much to take   Used for:  Gastroesophageal reflux disease without esophagitis, Left-sided low back pain without sciatica, unspecified chronicity, Constipation, unspecified constipation type, Hemorrhoids, unspecified hemorrhoid type, Idiopathic aplastic anemia (H), Pancytopenia (H)   Changed by:  Rafita Rogel MD        Dose:  20 mg   Take 1 tablet (20 mg) by mouth daily   Quantity:  30 tablet   Refills:  3            Where to get your medicines      These medications were sent to [facility] - [address]  [address] [facility] [address]     Phone:  [phone]     pantoprazole 20 MG EC tablet                Recent Review Flowsheet  Data     BMT Recent Results Latest Ref Rng & Units 9/20/2017 10/12/2017 11/15/2017 12/14/2017 1/10/2018 1/25/2018 2/15/2018    WBC 4.0 - 11.0 10e9/L 3.6(L) 2.7(L) 2.8(L) 3.2(L) 2.9(L) 3.2(L) 2.7(L)    Hemoglobin 11.7 - 15.7 g/dL 8.6(L) 8.4(L) 8.4(L) 8.0(L) 8.8(L) 9.1(L) 8.7(L)    Platelet Count 150 - 450 10e9/L 46(LL) 38(LL) 49(LL) 45(LL) 51(L) 52(L) 41(LL)    Neutrophils (Absolute) 1.6 - 8.3 10e9/L 2.0 0.9(L) 1.4(L) 1.4(L) 1.5(L) 1.4(L) 1.1(L)    INR 0.86 - 1.14 - - - - 1.01 - -    Sodium 133 - 144 mmol/L 137 138 139 140 142 141 139    Potassium 3.4 - 5.3 mmol/L 3.6 4.3 4.0 4.5 4.0 4.1 4.6    Chloride 94 - 109 mmol/L 105 107 108 110(H) 109 109 106    Glucose 70 - 99 mg/dL 115(H) 103(H) 88 116(H) 92 69(L) 99    Urea Nitrogen 7 - 30 mg/dL 28 35(H) 39(H) 41(H) 37(H) 39(H) 30    Creatinine 0.52 - 1.04 mg/dL 1.29(H) 1.34(H) 1.38(H) 1.50(H) 1.71(H) 1.55(H) 1.26(H)    Calcium (Total) 8.5 - 10.1 mg/dL 9.0 8.7 8.6 8.5 8.7 9.0 8.9    Protein (Total) 6.8 - 8.8 g/dL 7.7 7.1 7.3 7.2 7.7 7.6 7.2    Albumin 3.4 - 5.0 g/dL 3.6 3.4 3.4 3.6 3.5 3.6 3.5    Bilirubin (Direct) 0.0 - 0.2 mg/dL - - - - - - -    Alkaline Phosphatase 40 - 150 U/L 78 72 79 80 81 84 79    AST 0 - 45 U/L 14 13 14 16 13 13 10    ALT 0 - 50 U/L 25 20 22 21 11 18 12    MCV 78 - 100 fl 119(H) 119(H) 121(H) 120(H) 120(H) 117(H) 119(H)               Primary Care Provider Office Phone # Fax #    Shivani Phelps PA-C [phone] [address] [facility] [address]  [place] [address]        Equal Access to Services     CHARLES NOLASCO : Hadii erica pérezo Somichael, waaxda luqadaha, qaybta kaalmada adeegyalandy, ambika strong . So [facility] [phone].    ATENCIÓN: Si habla español, tiene a robertson disposición servicios gratuitos de asistencia lingüística. Cayden al [phone].    We comply with applicable federal civil rights laws and [place] laws. We do not discriminate on the basis of race, color, national origin, age, disability, sex,  sexual orientation, or gender identity.            Thank you!     Thank you for choosing [facility] BLOOD AND MARROW TRANSPLANT  for your care. Our goal is always to provide you with excellent care. Hearing back from our patients is one way we can continue to improve our services. Please take a few minutes to complete the written survey that you may receive in the mail after your visit with us. Thank you!             Your Updated Medication List - Protect others around you: Learn how to safely use, store and throw away your medicines at www.disposemymeds.org.          This list is accurate as of 2/15/18  5:24 PM.  Always use your most recent med list.                   Brand Name Dispense Instructions for use Diagnosis    BENADRYL ALLERGY 25 MG tablet   Generic drug:  diphenhydrAMINE     56 tablet    Take 25 mg by mouth At Bedtime        chlorpheniramine 4 MG tablet    CHLOR-TRIMETON     Take 4 mg by mouth every 6 hours as needed. For head cold        COMPRESSION STOCKINGS     2 each    Wear compression stockings at 20-30 mmHg rating most time during the day to the affected leg (left leg) or both legs. Take them off at night.    DVT (deep venous thrombosis), left, Postphlebitic syndrome       * cycloSPORINE modified 100 MG capsule    GENERIC EQUIVALENT    120 capsule    Take 1 capsule (100 mg) by mouth 2 times daily as of Jan 11, 2018    Gastroesophageal reflux disease without esophagitis, Left-sided low back pain without sciatica, unspecified chronicity, Constipation, unspecified constipation type, Hemorrhoids, unspecified hemorrhoid type       * cycloSPORINE modified 25 MG capsule    GENERIC EQUIVALENT    540 capsule    Take 1 capsule (25 mg) by mouth 2 times daily 100 mg twice daily as of Jan 11,. 2018 or as directed    Aplastic anemia (H)       eltrombopag 50 MG tablet    PROMACTA    90 tablet    Take 3 tablets (150 mg) by mouth daily Administer on an empty stomach, 1 hour before or 2 hours after a meal. Or as  directed    Idiopathic aplastic anemia (H), Pancytopenia (H)       loratadine 10 MG tablet    CLARITIN     Take 10 mg by mouth daily as needed Reported on 4/26/2017        order for DME     1 Box    Equipment being ordered: knee high compression stockings- 18-20 mm    DVT prophylaxis       pantoprazole 20 MG EC tablet    PROTONIX    30 tablet    Take 1 tablet (20 mg) by mouth daily    Gastroesophageal reflux disease without esophagitis, Left-sided low back pain without sciatica, unspecified chronicity, Constipation, unspecified constipation type, Hemorrhoids, unspecified hemorrhoid type, Idiopathic aplastic anemia (H), Pancytopenia (H)       TYLENOL PO      Take 325 mg by mouth every 6 hours as needed for mild pain or fever        * Notice:  This list has 2 medication(s) that are the same as other medications prescribed for you. Read the directions carefully, and ask your doctor or other care provider to review them with you.

## 2018-02-15 NOTE — PROGRESS NOTES
/73  Pulse 83  Temp 98.6  F (37  C) (Oral)  Resp 16  Wt 51.3 kg (113 lb 3.2 oz)  SpO2 98%  BMI 20.7 kg/m2  Wt Readings from Last 4 Encounters:   02/15/18 51.3 kg (113 lb 3.2 oz)   01/10/18 52.4 kg (115 lb 8 oz)   12/14/17 52.9 kg (116 lb 11.2 oz)   11/15/17 52.2 kg (115 lb 1.6 oz)     Lisseth returns to follow-up her aplastic anemia. She continues on cyclosporine and eltrombopag. 2 weeks ago she had a dental extraction with a preprocedure platelet transfusion and had no problems with bleeding. She has a small 1 mm or so. Cystlike lesion on the inside of the dental extraction site, but there is not much redness around it. It's only minimally tender. She's also  had some bad headaches with associated vomiting in the last 2 weeks but no fever or symptoms suggestive of a respiratory infection. Her GI tract and the headaches are much improved the last several days, but she lost a few pounds. She's had no external bleeding and has no respiratory, cardiac or other ENT symptoms. She has no skin rash.    Her exam shows her weight down 2.5 lb but she has no peripheral edema, or bone tenderness. She has no petechiae. Her oropharynx shows the above-mentioned cystic/mucocele-like lesion on the inside of her dental extraction site, but no oral petechiae. She has no peripheral adenopathy. Her lungs are clear and her heart tones are regular. Her abdomen is soft without masses are palpable hepatosplenomegaly.    Her blood counts are low but in the same range as before and her chemistries are good. We will continue on cyclosporine 100 mg twice daily. Because of compromised renal function we have been slowly tapering her dose, but the eltrombopag will continue at 150 mg daily. I've reduced her pantoprazole to 20 mg daily.     We'll now check her blood counts monthly. I'll see her in 2 months time. She knows to call if questions arise.    Rafita Rogel M.D.    Professor of Medicine    Results for JL LISSETH PAK (MRN  [id number]) as of 2/15/2018 17:14   Ref. Range 2/15/2018 16:28   Sodium Latest Ref Range: 133 - 144 mmol/L 139   Potassium Latest Ref Range: 3.4 - 5.3 mmol/L 4.6   Chloride Latest Ref Range: 94 - 109 mmol/L 106   Carbon Dioxide Latest Ref Range: 20 - 32 mmol/L 26   Urea Nitrogen Latest Ref Range: 7 - 30 mg/dL 30   Creatinine Latest Ref Range: 0.52 - 1.04 mg/dL 1.26 (H)   GFR Estimate Latest Ref Range: >60 mL/min/1.7m2 41 (L)   GFR Estimate If Black Latest Ref Range: >60 mL/min/1.7m2 50 (L)   Calcium Latest Ref Range: 8.5 - 10.1 mg/dL 8.9   Anion Gap Latest Ref Range: 3 - 14 mmol/L 6   Albumin Latest Ref Range: 3.4 - 5.0 g/dL 3.5   Protein Total Latest Ref Range: 6.8 - 8.8 g/dL 7.2   Bilirubin Total Latest Ref Range: 0.2 - 1.3 mg/dL 0.9   Alkaline Phosphatase Latest Ref Range: 40 - 150 U/L 79   ALT Latest Ref Range: 0 - 50 U/L 12   AST Latest Ref Range: 0 - 45 U/L 10   Glucose Latest Ref Range: 70 - 99 mg/dL 99   WBC Latest Ref Range: 4.0 - 11.0 10e9/L 2.7 (L)   Hemoglobin Latest Ref Range: 11.7 - 15.7 g/dL 8.7 (L)   Hematocrit Latest Ref Range: 35.0 - 47.0 % 26.9 (L)   Platelet Count Latest Ref Range: 150 - 450 10e9/L 41 (LL)   RBC Count Latest Ref Range: 3.8 - 5.2 10e12/L 2.26 (L)   MCV Latest Ref Range: 78 - 100 fl 119 (H)   MCH Latest Ref Range: 26.5 - 33.0 pg 38.5 (H)   MCHC Latest Ref Range: 31.5 - 36.5 g/dL 32.3   RDW Latest Ref Range: 10.0 - 15.0 % 14.9   Diff Method Unknown Automated Method   % Neutrophils Latest Units: % 39.4   % Lymphocytes Latest Units: % 47.8   % Monocytes Latest Units: % 8.4   % Eosinophils Latest Units: % 4.4   % Basophils Latest Units: % 0.0   % Immature Granulocytes Latest Units: % 0.0   Nucleated RBCs Latest Ref Range: 0 /100 0   Absolute Neutrophil Latest Ref Range: 1.6 - 8.3 10e9/L 1.1 (L)   Absolute Lymphocytes Latest Ref Range: 0.8 - 5.3 10e9/L 1.3   Absolute Monocytes Latest Ref Range: 0.0 - 1.3 10e9/L 0.2   Absolute Eosinophils Latest Ref Range: 0.0 - 0.7 10e9/L 0.1   Absolute  Basophils Latest Ref Range: 0.0 - 0.2 10e9/L 0.0   Abs Immature Granulocytes Latest Ref Range: 0 - 0.4 10e9/L 0.0   Absolute Nucleated RBC Unknown 0.0   Platelet Estimate Unknown Confirming automa...

## 2018-02-15 NOTE — LETTER
2/15/2018      RE: Bonny Raymundo  [address]  [facility] [address]       /73  Pulse 83  Temp 98.6  F (37  C) (Oral)  Resp 16  Wt 51.3 kg (113 lb 3.2 oz)  SpO2 98%  BMI 20.7 kg/m2  Wt Readings from Last 4 Encounters:   02/15/18 51.3 kg (113 lb 3.2 oz)   01/10/18 52.4 kg (115 lb 8 oz)   12/14/17 52.9 kg (116 lb 11.2 oz)   11/15/17 52.2 kg (115 lb 1.6 oz)     Bonny returns to follow-up her aplastic anemia. She continues on cyclosporine and eltrombopag. 2 weeks ago she had a dental extraction with a preprocedure platelet transfusion and had no problems with bleeding. She has a small 1 mm or so. Cystlike lesion on the inside of the dental extraction site, but there is not much redness around it. It's only minimally tender. She's also  had some bad headaches with associated vomiting in the last 2 weeks but no fever or symptoms suggestive of a respiratory infection. Her GI tract and the headaches are much improved the last several days, but she lost a few pounds. She's had no external bleeding and has no respiratory, cardiac or other ENT symptoms. She has no skin rash.    Her exam shows her weight down 2.5 lb but she has no peripheral edema, or bone tenderness. She has no petechiae. Her oropharynx shows the above-mentioned cystic/mucocele-like lesion on the inside of her dental extraction site, but no oral petechiae. She has no peripheral adenopathy. Her lungs are clear and her heart tones are regular. Her abdomen is soft without masses are palpable hepatosplenomegaly.    Her blood counts are low but in the same range as before and her chemistries are good. We will continue on cyclosporine 100 mg twice daily. Because of compromised renal function we have been slowly tapering her dose, but the eltrombopag will continue at 150 mg daily. I've reduced her pantoprazole to 20 mg daily.     We'll now check her blood counts monthly. I'll see her in 2 months time. She knows to call if questions  arise.    Rafita Rogel M.D.    Professor of Medicine    Results for LISSETH PARIKH (MRN [id number]) as of 2/15/2018 17:14   Ref. Range 2/15/2018 16:28   Sodium Latest Ref Range: 133 - 144 mmol/L 139   Potassium Latest Ref Range: 3.4 - 5.3 mmol/L 4.6   Chloride Latest Ref Range: 94 - 109 mmol/L 106   Carbon Dioxide Latest Ref Range: 20 - 32 mmol/L 26   Urea Nitrogen Latest Ref Range: 7 - 30 mg/dL 30   Creatinine Latest Ref Range: 0.52 - 1.04 mg/dL 1.26 (H)   GFR Estimate Latest Ref Range: >60 mL/min/1.7m2 41 (L)   GFR Estimate If Black Latest Ref Range: >60 mL/min/1.7m2 50 (L)   Calcium Latest Ref Range: 8.5 - 10.1 mg/dL 8.9   Anion Gap Latest Ref Range: 3 - 14 mmol/L 6   Albumin Latest Ref Range: 3.4 - 5.0 g/dL 3.5   Protein Total Latest Ref Range: 6.8 - 8.8 g/dL 7.2   Bilirubin Total Latest Ref Range: 0.2 - 1.3 mg/dL 0.9   Alkaline Phosphatase Latest Ref Range: 40 - 150 U/L 79   ALT Latest Ref Range: 0 - 50 U/L 12   AST Latest Ref Range: 0 - 45 U/L 10   Glucose Latest Ref Range: 70 - 99 mg/dL 99   WBC Latest Ref Range: 4.0 - 11.0 10e9/L 2.7 (L)   Hemoglobin Latest Ref Range: 11.7 - 15.7 g/dL 8.7 (L)   Hematocrit Latest Ref Range: 35.0 - 47.0 % 26.9 (L)   Platelet Count Latest Ref Range: 150 - 450 10e9/L 41 (LL)   RBC Count Latest Ref Range: 3.8 - 5.2 10e12/L 2.26 (L)   MCV Latest Ref Range: 78 - 100 fl 119 (H)   MCH Latest Ref Range: 26.5 - 33.0 pg 38.5 (H)   MCHC Latest Ref Range: 31.5 - 36.5 g/dL 32.3   RDW Latest Ref Range: 10.0 - 15.0 % 14.9   Diff Method Unknown Automated Method   % Neutrophils Latest Units: % 39.4   % Lymphocytes Latest Units: % 47.8   % Monocytes Latest Units: % 8.4   % Eosinophils Latest Units: % 4.4   % Basophils Latest Units: % 0.0   % Immature Granulocytes Latest Units: % 0.0   Nucleated RBCs Latest Ref Range: 0 /100 0   Absolute Neutrophil Latest Ref Range: 1.6 - 8.3 10e9/L 1.1 (L)   Absolute Lymphocytes Latest Ref Range: 0.8 - 5.3 10e9/L 1.3   Absolute Monocytes Latest Ref Range:  0.0 - 1.3 10e9/L 0.2   Absolute Eosinophils Latest Ref Range: 0.0 - 0.7 10e9/L 0.1   Absolute Basophils Latest Ref Range: 0.0 - 0.2 10e9/L 0.0   Abs Immature Granulocytes Latest Ref Range: 0 - 0.4 10e9/L 0.0   Absolute Nucleated RBC Unknown 0.0   Platelet Estimate Unknown Confirming automa...       Rafita Rogel MD

## 2018-02-22 ENCOUNTER — TELEPHONE (OUTPATIENT)
Dept: AUDIOLOGY | Facility: CLINIC | Age: 73
End: 2018-02-22

## 2018-02-22 NOTE — TELEPHONE ENCOUNTER
Left voicemail stating that our clinic has not heard from her regarding which device (if any) that she would like to trial. Patient is currently scheduled for a hearing aid fitting appointment on 3/1/18 and so if we do not hear from her by tomorrow that appointment will be cancelled as we will not have a device to fit her with.  Left clinic number for patient to call if she would like to order a device.    Kamilah Cruz  Audiologist  [place] License  #[id number]

## 2018-03-14 ENCOUNTER — ONCOLOGY VISIT (OUTPATIENT)
Dept: ONCOLOGY | Facility: CLINIC | Age: 73
End: 2018-03-14
Attending: PHYSICIAN ASSISTANT
Payer: COMMERCIAL

## 2018-03-14 VITALS
OXYGEN SATURATION: 98 % | HEIGHT: 62 IN | DIASTOLIC BLOOD PRESSURE: 79 MMHG | SYSTOLIC BLOOD PRESSURE: 132 MMHG | RESPIRATION RATE: 16 BRPM | TEMPERATURE: 97.7 F | BODY MASS INDEX: 20.85 KG/M2 | HEART RATE: 92 BPM | WEIGHT: 113.3 LBS

## 2018-03-14 DIAGNOSIS — K21.9 GASTROESOPHAGEAL REFLUX DISEASE WITHOUT ESOPHAGITIS: Primary | ICD-10-CM

## 2018-03-14 DIAGNOSIS — D61.818 PANCYTOPENIA (H): ICD-10-CM

## 2018-03-14 DIAGNOSIS — D61.3 IDIOPATHIC APLASTIC ANEMIA (H): ICD-10-CM

## 2018-03-14 LAB
ALBUMIN SERPL-MCNC: 3.5 G/DL (ref 3.4–5)
ALP SERPL-CCNC: 81 U/L (ref 40–150)
ALT SERPL W P-5'-P-CCNC: 13 U/L (ref 0–50)
ANION GAP SERPL CALCULATED.3IONS-SCNC: 7 MMOL/L (ref 3–14)
AST SERPL W P-5'-P-CCNC: 12 U/L (ref 0–45)
BASOPHILS # BLD AUTO: 0 10E9/L (ref 0–0.2)
BASOPHILS NFR BLD AUTO: 0.3 %
BILIRUB SERPL-MCNC: 1.2 MG/DL (ref 0.2–1.3)
BUN SERPL-MCNC: 40 MG/DL (ref 7–30)
CALCIUM SERPL-MCNC: 8.8 MG/DL (ref 8.5–10.1)
CHLORIDE SERPL-SCNC: 110 MMOL/L (ref 94–109)
CO2 SERPL-SCNC: 24 MMOL/L (ref 20–32)
CREAT SERPL-MCNC: 1.23 MG/DL (ref 0.52–1.04)
CYCLOSPORINE BLD LC/MS/MS-MCNC: 132 UG/L (ref 50–400)
DIFFERENTIAL METHOD BLD: ABNORMAL
EOSINOPHIL # BLD AUTO: 0.2 10E9/L (ref 0–0.7)
EOSINOPHIL NFR BLD AUTO: 4.2 %
ERYTHROCYTE [DISTWIDTH] IN BLOOD BY AUTOMATED COUNT: 15.2 % (ref 10–15)
GFR SERPL CREATININE-BSD FRML MDRD: 43 ML/MIN/1.7M2
GLUCOSE SERPL-MCNC: 110 MG/DL (ref 70–99)
HCT VFR BLD AUTO: 25.6 % (ref 35–47)
HGB BLD-MCNC: 8.6 G/DL (ref 11.7–15.7)
IMM GRANULOCYTES # BLD: 0 10E9/L (ref 0–0.4)
IMM GRANULOCYTES NFR BLD: 0 %
LYMPHOCYTES # BLD AUTO: 1.5 10E9/L (ref 0.8–5.3)
LYMPHOCYTES NFR BLD AUTO: 41 %
MCH RBC QN AUTO: 39.8 PG (ref 26.5–33)
MCHC RBC AUTO-ENTMCNC: 33.6 G/DL (ref 31.5–36.5)
MCV RBC AUTO: 119 FL (ref 78–100)
MONOCYTES # BLD AUTO: 0.3 10E9/L (ref 0–1.3)
MONOCYTES NFR BLD AUTO: 9.3 %
NEUTROPHILS # BLD AUTO: 1.6 10E9/L (ref 1.6–8.3)
NEUTROPHILS NFR BLD AUTO: 45.2 %
NRBC # BLD AUTO: 0 10*3/UL
NRBC BLD AUTO-RTO: 0 /100
PLATELET # BLD AUTO: 53 10E9/L (ref 150–450)
POTASSIUM SERPL-SCNC: 4.4 MMOL/L (ref 3.4–5.3)
PROT SERPL-MCNC: 7.1 G/DL (ref 6.8–8.8)
RBC # BLD AUTO: 2.16 10E12/L (ref 3.8–5.2)
SODIUM SERPL-SCNC: 140 MMOL/L (ref 133–144)
TME LAST DOSE: NORMAL H
WBC # BLD AUTO: 3.5 10E9/L (ref 4–11)

## 2018-03-14 PROCEDURE — G0463 HOSPITAL OUTPT CLINIC VISIT: HCPCS | Mod: ZF

## 2018-03-14 PROCEDURE — 80158 DRUG ASSAY CYCLOSPORINE: CPT | Performed by: PHYSICIAN ASSISTANT

## 2018-03-14 PROCEDURE — 80053 COMPREHEN METABOLIC PANEL: CPT | Performed by: INTERNAL MEDICINE

## 2018-03-14 PROCEDURE — 85025 COMPLETE CBC W/AUTO DIFF WBC: CPT | Performed by: INTERNAL MEDICINE

## 2018-03-14 PROCEDURE — 99214 OFFICE O/P EST MOD 30 MIN: CPT | Mod: ZP | Performed by: PHYSICIAN ASSISTANT

## 2018-03-14 RX ORDER — PANTOPRAZOLE SODIUM 40 MG/1
40 TABLET, DELAYED RELEASE ORAL DAILY
Qty: 30 TABLET | Refills: 3 | Status: SHIPPED | OUTPATIENT
Start: 2018-03-14 | End: 2018-08-08

## 2018-03-14 ASSESSMENT — PAIN SCALES - GENERAL: PAINLEVEL: MILD PAIN (2)

## 2018-03-14 NOTE — PROGRESS NOTES
[facility]  FOLLOW-UP VISIT NOTE  Date of visit: Mar 14, 2018            REASON FOR VISIT: Aplastic anemia, routine 4 week follow up    HPI: Bonny is a 71 year old female who presented in the fall of 2016 with fatigue and shortness of breath. 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  Her counts showed pancytopenia and BMBX was concerning for aplastic anemia.  By December of 2016 she was transfusion dependent with platelets under 10 k and ANC dropped under 500. 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  She has met with Dr Mcdaniel at [facility] and consulted with Dr Rogel at [facility].      After further consultation it was decided to admit on 1/9/17 for ATG/cyclosporine/prednisone therapy.     The following was her inpatient regimen:  Day 1= 1/9/17  ATG 2500 mg (40 mg/kg0 q24 hours D-4)  Cyclosporine 200 mg (3 mg/kg) PO bid  Eltrombopag 50 mg daily  Methylpred 31 mg (0.5 mg/kg) q24 hours D1-4  Prednisone 60 mg (1 mg/kg) daily after completion of IV methylpred to continue for at least 2 weeks      INTERVAL HISTORY: Bonny is here today for her q4w follow up. She had her tooth pulled last month.  Oddly, she had some nausea and vomiting that lasted for a few days after the procedure, she improved and then it occurred again the next week.  Oral surgeon was unsure what the cause was.  The last few weeks the nausea has resolved and she has been eating well.  She has had worsening heartburn and wonders if this is from decreasing her Protonix dose.  She has completed PT with attaining her goals of improved ambulation, she is really glad she did PT. She is walking further with the dog- 6 blocks now and can get through [employer] and the grocery store without issus.  She feels her dysphagia is better and that she is eating more- weight is still not as high as she would like it to be, but at least it is stable.      No fevers, chest pain  "and and no bleeding. Daily BM, no black/blood. No  issues. She has osteoporosis and needs to get on a bisphosphonate at some point, but that will have to be put off with the dental issues.     ROS: complete review of systems was performed which was negative unless noted above.    EXAM:  /79 (BP Location: Left arm, Patient Position: Left side, Cuff Size: Adult Small)  Pulse 92  Temp 97.7  F (36.5  C) (Oral)  Resp 16  Ht 1.575 m (5' 2.01\")  Wt 51.4 kg (113 lb 4.8 oz)  SpO2 98%  BMI 20.72 kg/m2  Wt Readings from Last 4 Encounters:   03/14/18 51.4 kg (113 lb 4.8 oz)   02/15/18 51.3 kg (113 lb 3.2 oz)   01/10/18 52.4 kg (115 lb 8 oz)   12/14/17 52.9 kg (116 lb 11.2 oz)     General: Patient alert and oriented x3.   EYES: PERRLA, conjunctiva pink  Neck: No palpable cervical, supraclavicular or axillary nodes bilaterally.   Cardiovascular: RRR, no murmurs, gallops or rubs  Respiratory: clear to auscultation bilaterally;  no crackles, rhonchi or wheezing.   Abdomen: positive bowel sounds in all four quadrants, soft, nontender  Neuro: grossly intact.   Mood and affect is stable.       LABS:      3/14/2018 11:52   Sodium 140   Potassium 4.4   Chloride 110 (H)   Carbon Dioxide 24   Urea Nitrogen 40 (H)   Creatinine 1.23 (H)   GFR Estimate 43 (L)   GFR Estimate If Black 52 (L)   Calcium 8.8   Anion Gap 7   Albumin 3.5   Protein Total 7.1   Bilirubin Total 1.2   Alkaline Phosphatase 81   ALT 13   AST 12   Glucose 110 (H)   WBC 3.5 (L)   Hemoglobin 8.6 (L)   Hematocrit 25.6 (L)   Platelet Count 53 (L)   RBC Count 2.16 (L)    (H)   MCH 39.8 (H)   MCHC 33.6   RDW 15.2 (H)   Diff Method Automated Method   % Neutrophils 45.2   % Lymphocytes 41.0   % Monocytes 9.3   % Eosinophils 4.2   % Basophils 0.3   % Immature Granulocytes 0.0   Nucleated RBCs 0   Absolute Neutrophil 1.6       ASSESSMENT/PLAN: 71 year old female with AA, now has been transfusion INDEPENDENT since April 2017.    HEME- Treatment for AA " 1/9-1/13/17 with ATG, methylpred, cyclosporine and eltrombopag. Has been off her prednisone and ppx medications since spring.  Her stamina has improved since I saw her last and counts are now independent from transfusions since April of 2017.   -cyclosporine 100 mg BID  -eltrombopag 150 mg daily  -counts  q4 w  -transfuse if hgb <7.5 or symptomatic and platelets <30k (or prior to dental issues)  -q4-6 weeks for labs/visit    Renal: Creat has improved with the lower dose of CSA.  Continue hydration.      CV-two prior provoked DVT in 2012 and 2014 with travel. 2012 was associated with PE. Was on warfarin/lovenox in the past.  Transitioned to ASA which has been held in the setting of low platelets.  Off all BP medications    ID-  No fever or symptoms of infection. No ppx medications   - CMV was neg  - no more pentamidine    MSK- low back -Low thoracic mid spine pain which has significantly improved.  Has h/o compression fractures and has osteoporosis.  She will need Prolia once she is finished with her dental work    FEN: bobby improved, weight stable.      GI: has GERD and dropped her Protonix from 40 mg to 20 mg.  Since then she has been having quite a bit of GERD/ burping.  She will go back up to 40 mg for a couple weeks and then try and taper down as high/prolonged usage makes it more challenging to cut doses and a taper is suggested.     Sejal Walls PA-C

## 2018-03-14 NOTE — NURSING NOTE
"Oncology Rooming Note    March 14, 2018 12:04 PM   Bonny Raymundo is a 72 year old female who presents for:    Chief Complaint   Patient presents with     Blood Draw     Labs drawn and vitals done by MA for provider visit     Oncology Clinic Visit     Return visit related to Aplastic Anemia     Initial Vitals: /79 (BP Location: Left arm, Patient Position: Left side, Cuff Size: Adult Small)  Pulse 92  Temp 97.7  F (36.5  C) (Oral)  Resp 16  Ht 1.575 m (5' 2.01\")  Wt 51.4 kg (113 lb 4.8 oz)  SpO2 98%  BMI 20.72 kg/m2 Estimated body mass index is 20.72 kg/(m^2) as calculated from the following:    Height as of this encounter: 1.575 m (5' 2.01\").    Weight as of this encounter: 51.4 kg (113 lb 4.8 oz). Body surface area is 1.5 meters squared.  Mild Pain (2) Comment: Data Unavailable   No LMP recorded. Patient has had a hysterectomy.  Allergies reviewed: Yes  Medications reviewed: Yes    Medications: Medication refills not needed today.  Pharmacy name entered into [facility]:    [facility] [place], [place] - [address]  [facility], [place] - [address]  [facility], [address]  WRITTEN PRESCRIPTION REQUESTED    Clinical concerns: No new concerns. Provider was notified.    10 minutes for nursing intake (face to face time)     Sydnee Patel LPN            "

## 2018-03-14 NOTE — NURSING NOTE
Chief Complaint   Patient presents with     Blood Draw     Labs drawn and vitals done by MA for provider visit     Skylardaisy Hood MA

## 2018-04-05 ENCOUNTER — APPOINTMENT (OUTPATIENT)
Dept: LAB | Facility: CLINIC | Age: 73
End: 2018-04-05
Attending: PHYSICIAN ASSISTANT
Payer: COMMERCIAL

## 2018-04-05 ENCOUNTER — OFFICE VISIT (OUTPATIENT)
Dept: TRANSPLANT | Facility: CLINIC | Age: 73
End: 2018-04-05
Attending: INTERNAL MEDICINE
Payer: COMMERCIAL

## 2018-04-05 VITALS
TEMPERATURE: 97.8 F | RESPIRATION RATE: 16 BRPM | WEIGHT: 113.9 LBS | OXYGEN SATURATION: 98 % | BODY MASS INDEX: 20.83 KG/M2 | DIASTOLIC BLOOD PRESSURE: 86 MMHG | SYSTOLIC BLOOD PRESSURE: 144 MMHG | HEART RATE: 83 BPM

## 2018-04-05 DIAGNOSIS — D61.3 IDIOPATHIC APLASTIC ANEMIA (H): ICD-10-CM

## 2018-04-05 DIAGNOSIS — D61.9 APLASTIC ANEMIA (H): ICD-10-CM

## 2018-04-05 DIAGNOSIS — D61.818 PANCYTOPENIA (H): ICD-10-CM

## 2018-04-05 LAB
ALBUMIN SERPL-MCNC: 3.4 G/DL (ref 3.4–5)
ALP SERPL-CCNC: 84 U/L (ref 40–150)
ALT SERPL W P-5'-P-CCNC: 14 U/L (ref 0–50)
ANION GAP SERPL CALCULATED.3IONS-SCNC: 7 MMOL/L (ref 3–14)
AST SERPL W P-5'-P-CCNC: 12 U/L (ref 0–45)
BASOPHILS # BLD AUTO: 0 10E9/L (ref 0–0.2)
BASOPHILS NFR BLD AUTO: 0 %
BILIRUB SERPL-MCNC: 1.2 MG/DL (ref 0.2–1.3)
BUN SERPL-MCNC: 35 MG/DL (ref 7–30)
CALCIUM SERPL-MCNC: 8.8 MG/DL (ref 8.5–10.1)
CHLORIDE SERPL-SCNC: 111 MMOL/L (ref 94–109)
CO2 SERPL-SCNC: 23 MMOL/L (ref 20–32)
CREAT SERPL-MCNC: 1.18 MG/DL (ref 0.52–1.04)
CYCLOSPORINE BLD LC/MS/MS-MCNC: 108 UG/L (ref 50–400)
DIFFERENTIAL METHOD BLD: ABNORMAL
EOSINOPHIL # BLD AUTO: 0.2 10E9/L (ref 0–0.7)
EOSINOPHIL NFR BLD AUTO: 4.3 %
ERYTHROCYTE [DISTWIDTH] IN BLOOD BY AUTOMATED COUNT: 15.6 % (ref 10–15)
GFR SERPL CREATININE-BSD FRML MDRD: 45 ML/MIN/1.7M2
GLUCOSE SERPL-MCNC: 107 MG/DL (ref 70–99)
HCT VFR BLD AUTO: 25.8 % (ref 35–47)
HGB BLD-MCNC: 8.8 G/DL (ref 11.7–15.7)
IMM GRANULOCYTES # BLD: 0 10E9/L (ref 0–0.4)
IMM GRANULOCYTES NFR BLD: 0 %
LYMPHOCYTES # BLD AUTO: 1.4 10E9/L (ref 0.8–5.3)
LYMPHOCYTES NFR BLD AUTO: 41.2 %
MCH RBC QN AUTO: 40 PG (ref 26.5–33)
MCHC RBC AUTO-ENTMCNC: 34.1 G/DL (ref 31.5–36.5)
MCV RBC AUTO: 117 FL (ref 78–100)
MONOCYTES # BLD AUTO: 0.2 10E9/L (ref 0–1.3)
MONOCYTES NFR BLD AUTO: 6.7 %
NEUTROPHILS # BLD AUTO: 1.7 10E9/L (ref 1.6–8.3)
NEUTROPHILS NFR BLD AUTO: 47.8 %
NRBC # BLD AUTO: 0 10*3/UL
NRBC BLD AUTO-RTO: 0 /100
PLATELET # BLD AUTO: 54 10E9/L (ref 150–450)
POTASSIUM SERPL-SCNC: 4.5 MMOL/L (ref 3.4–5.3)
PROT SERPL-MCNC: 7.4 G/DL (ref 6.8–8.8)
RBC # BLD AUTO: 2.2 10E12/L (ref 3.8–5.2)
SODIUM SERPL-SCNC: 140 MMOL/L (ref 133–144)
TME LAST DOSE: NORMAL H
WBC # BLD AUTO: 3.5 10E9/L (ref 4–11)

## 2018-04-05 PROCEDURE — 80158 DRUG ASSAY CYCLOSPORINE: CPT | Performed by: INTERNAL MEDICINE

## 2018-04-05 PROCEDURE — 80053 COMPREHEN METABOLIC PANEL: CPT | Performed by: INTERNAL MEDICINE

## 2018-04-05 PROCEDURE — G0463 HOSPITAL OUTPT CLINIC VISIT: HCPCS

## 2018-04-05 PROCEDURE — 85025 COMPLETE CBC W/AUTO DIFF WBC: CPT | Performed by: INTERNAL MEDICINE

## 2018-04-05 PROCEDURE — 36415 COLL VENOUS BLD VENIPUNCTURE: CPT

## 2018-04-05 RX ORDER — CYCLOSPORINE 25 MG/1
100 CAPSULE, LIQUID FILLED ORAL 2 TIMES DAILY
Qty: 540 CAPSULE | Refills: 6 | Status: SHIPPED | OUTPATIENT
Start: 2018-04-05 | End: 2018-06-28

## 2018-04-05 ASSESSMENT — PAIN SCALES - GENERAL: PAINLEVEL: NO PAIN (0)

## 2018-04-05 NOTE — NURSING NOTE
"Oncology Rooming Note    April 5, 2018 3:57 PM   Bonny Raymundo is a 72 year old female who presents for:    Chief Complaint   Patient presents with     Blood Draw     labs drawn by venipuncture by rn.  vs taken.     Initial Vitals: /86 (BP Location: Right arm, Patient Position: Sitting, Cuff Size: Adult Regular)  Pulse 83  Temp 97.8  F (36.6  C) (Oral)  Resp 16  Wt 51.7 kg (113 lb 14.4 oz)  SpO2 98%  BMI 20.83 kg/m2 Estimated body mass index is 20.83 kg/(m^2) as calculated from the following:    Height as of 3/14/18: 1.575 m (5' 2.01\").    Weight as of this encounter: 51.7 kg (113 lb 14.4 oz). Body surface area is 1.5 meters squared.  No Pain (0) Comment: Data Unavailable   No LMP recorded. Patient has had a hysterectomy.  Allergies reviewed: Yes  Medications reviewed: Yes    Medications: Medication refills not needed today.  Pharmacy name entered into [facility]:    [facility] [facility], [place] - [address], [address]  [place] [facility], [place] - [address]  [facility], [facility], [address]  WRITTEN PRESCRIPTION REQUESTED    Clinical concerns: none     6 minutes for nursing intake (face to face time)     Skylar Hood MA              "

## 2018-04-05 NOTE — LETTER
4/5/2018      RE: Bonny Raymundo  [address]  [facility] [address]       /86 (BP Location: Right arm, Patient Position: Sitting, Cuff Size: Adult Regular)  Pulse 83  Temp 97.8  F (36.6  C) (Oral)  Resp 16  Wt 51.7 kg (113 lb 14.4 oz)  SpO2 98%  BMI 20.83 kg/m2  Wt Readings from Last 4 Encounters:   04/05/18 51.7 kg (113 lb 14.4 oz)   03/14/18 51.4 kg (113 lb 4.8 oz)   02/15/18 51.3 kg (113 lb 3.2 oz)   01/10/18 52.4 kg (115 lb 8 oz)     Bonny returns to follow-up her aplastic anemia.  She has been taking cyclosporine and eltrombopag for a number of months and her counts are  stable since last September. She had some GI upset with nausea and other problems in February and early March but with increasing her pantoprazole dosing, her GI symptoms have been lessening.  She has had no skin rash bleeding bruising fever chills or other GI symptoms.  She has had no respiratory or cardiac complaints.  She has a mild tremor but no problems with coordination or her balance.  The rest of her review of systems is unrevealing.    Her vital signs are acceptable (her BP was up a bit on arrival but that is unusual)  and her weights been stable for some months.  She has no peripheral lymphadenopathy or edema.  She has no focal bone tenderness.  There is no rash or oral mucosal cutaneous or conjunctival petechiae.  Her lungs are clear.  Her heart tones are regular there is no gallop rhythm.  Her abdomen is soft and nontender.  She has no palpable masses or hepatosplenomegaly.  She has a fine resting tremor but no other focal neurologic findings.    Her blood counts are stable though still low and I made no changes in her medications.  We are not aiming for full dose therapeutic level cyclosporine as she developed significant renal insufficiency when that was targeted and so we are treating her with cyclosporine to ensure her creatinine is not greater than perhaps 1.2 and accepting a level below the therapeutic  target of 200 and greater.    She has not needed transfusions in some time and has had no recent infections.  Will spread out the interval of her evaluation monitoring to every 6 weeks when she will return and I will see her again in 12 weeks time.  She knows to call if additional issues arise    Rafita Rogel MD    Professor medicine    Results for LISSETH PARIKH (MRN [id number]) as of 4/5/2018 16:38   Ref. Range 3/14/2018 11:52 4/5/2018 15:41   Sodium Latest Ref Range: 133 - 144 mmol/L 140 140   Potassium Latest Ref Range: 3.4 - 5.3 mmol/L 4.4 4.5   Chloride Latest Ref Range: 94 - 109 mmol/L 110 (H) 111 (H)   Carbon Dioxide Latest Ref Range: 20 - 32 mmol/L 24 23   Urea Nitrogen Latest Ref Range: 7 - 30 mg/dL 40 (H) 35 (H)   Creatinine Latest Ref Range: 0.52 - 1.04 mg/dL 1.23 (H) 1.18 (H)   GFR Estimate Latest Ref Range: >60 mL/min/1.7m2 43 (L) 45 (L)   GFR Estimate If Black Latest Ref Range: >60 mL/min/1.7m2 52 (L) 54 (L)   Calcium Latest Ref Range: 8.5 - 10.1 mg/dL 8.8 8.8   Anion Gap Latest Ref Range: 3 - 14 mmol/L 7 7   Albumin Latest Ref Range: 3.4 - 5.0 g/dL 3.5 3.4   Protein Total Latest Ref Range: 6.8 - 8.8 g/dL 7.1 7.4   Bilirubin Total Latest Ref Range: 0.2 - 1.3 mg/dL 1.2 1.2   Alkaline Phosphatase Latest Ref Range: 40 - 150 U/L 81 84   ALT Latest Ref Range: 0 - 50 U/L 13 14   AST Latest Ref Range: 0 - 45 U/L 12 12   Glucose Latest Ref Range: 70 - 99 mg/dL 110 (H) 107 (H)   WBC Latest Ref Range: 4.0 - 11.0 10e9/L 3.5 (L) 3.5 (L)   Hemoglobin Latest Ref Range: 11.7 - 15.7 g/dL 8.6 (L) 8.8 (L)   Hematocrit Latest Ref Range: 35.0 - 47.0 % 25.6 (L) 25.8 (L)   Platelet Count Latest Ref Range: 150 - 450 10e9/L 53 (L) 54 (L)   RBC Count Latest Ref Range: 3.8 - 5.2 10e12/L 2.16 (L) 2.20 (L)   MCV Latest Ref Range: 78 - 100 fl 119 (H) 117 (H)   MCH Latest Ref Range: 26.5 - 33.0 pg 39.8 (H) 40.0 (H)   MCHC Latest Ref Range: 31.5 - 36.5 g/dL 33.6 34.1   RDW Latest Ref Range: 10.0 - 15.0 % 15.2 (H) 15.6 (H)    Diff Method Unknown Automated Method Automated Method   % Neutrophils Latest Units: % 45.2 47.8   % Lymphocytes Latest Units: % 41.0 41.2   % Monocytes Latest Units: % 9.3 6.7   % Eosinophils Latest Units: % 4.2 4.3   % Basophils Latest Units: % 0.3 0.0   % Immature Granulocytes Latest Units: % 0.0 0.0   Nucleated RBCs Latest Ref Range: 0 /100 0 0   Absolute Neutrophil Latest Ref Range: 1.6 - 8.3 10e9/L 1.6 1.7   Absolute Lymphocytes Latest Ref Range: 0.8 - 5.3 10e9/L 1.5 1.4   Absolute Monocytes Latest Ref Range: 0.0 - 1.3 10e9/L 0.3 0.2   Absolute Eosinophils Latest Ref Range: 0.0 - 0.7 10e9/L 0.2 0.2   Absolute Basophils Latest Ref Range: 0.0 - 0.2 10e9/L 0.0 0.0   Abs Immature Granulocytes Latest Ref Range: 0 - 0.4 10e9/L 0.0 0.0   Absolute Nucleated RBC Unknown 0.0 0.0   CSA level  108    Rafita Rogel MD

## 2018-04-05 NOTE — MR AVS SNAPSHOT
After Visit Summary   4/5/2018    Bonny Raymundo    MRN: [id number]           Patient Information     Date Of Birth          1945        Visit Information        Provider Department      4/5/2018 4:00 PM Rafita Rogel MD [facility] Blood and Marrow Transplant        Today's Diagnoses     Pancytopenia (H)        Idiopathic aplastic anemia (H)        Aplastic anemia (H)              [facility] ([facility])  [address] [place] [address]  Phone: [phone]  Clinic Hours:   Monday-Thursday:7am to 7pm   Friday: 7am to 5pm   Weekends and holidays:    8am to noon (in general)  If your fever is 100.5  or greater,   call the clinic.  After hours call the   hospital at [phone] or   [phone]. Ask for the BMT   fellow on-call            Follow-ups after your visit        Follow-up notes from your care team     Return in about 6 weeks (around 5/17/2018).      Your next 10 appointments already scheduled     May 15, 2018 12:00 PM CDT   [facility] Lab Draw with  [facility] DRAW   [facility] Lab Draw ([facility])    [address] [place] [address]  [address]  [facility] [id number] [phone]            May 15, 2018 12:30 PM CDT   (Arrive by 12:15 PM)   Return Visit with Celine Walls PA-C   [facility] ([facility])    [address] [place] [address]  [address]  [facility] [id number] [phone]            Jun 28, 2018  3:00 PM CDT   [facility]ic Lab Draw with  MASONIC LAB DRAW   [facility] Lab Draw ([facility])    [address] [place] [address]  [address]  [facility] [id number] [phone]            Jun 28, 2018  3:30 PM CDT   RETURN ONC with Rafita Rogel MD   [facility] Blood and Marrow Transplant ([facility])    [address] [place] [address]  [address]  [facility] [id number] [phone]              Who to contact     If you have  "questions or need follow up information about today's clinic visit or your schedule please contact [facility] B[facility]OW TRANSPL[facility] directly at [phone].  Normal or non-critical lab and imaging results will be communicated to you by [employer], letter or phone within 4 business days after the clinic has received the results. If you do not hear from us within 7 days, please contact the clinic through [employer] or phone. If you have a critical or abnormal lab result, we will notify you by phone as soon as possible.  Submit refill requests through [employer] or call your pharmacy and they will forward the refill request to us. Please allow 3 business days for your refill to be completed.          Additional Information About Your Visit        [employer] Information     [employer] lets you send messages to your doctor, view your test results, renew your prescriptions, schedule appointments and more. To sign up, go to [email or web address] . Click on \"Log in\" on the left side of the screen, which will take you to the Welcome page. Then click on \"Sign up Now\" on the right side of the page.     You will be asked to enter the access code listed below, as well as some personal information. Please follow the directions to create your username and password.     Your access code is: [email or web address]  Expires: 2018  4:28 PM     Your access code will  in 90 days. If you need help or a new code, please call your [facility] clinic or [phone].        Care EveryWhere ID     This is your Care EveryWhere ID. This could be used by other organizations to access your [facility] medical records  [id number]        Your Vitals Were     Pulse Temperature Respirations Pulse Oximetry BMI (Body Mass Index)       83 97.8  F (36.6  C) (Oral) 16 98% 20.83 kg/m2        Blood Pressure from Last 3 Encounters:   18 144/86   18 132/79   02/15/18 138/73    Weight from Last 3 Encounters:   18 51.7 kg (113 lb 14.4 oz)   18 51.4 " kg (113 lb 4.8 oz)   02/15/18 51.3 kg (113 lb 3.2 oz)              We Performed the Following     CBC with platelets differential     Comprehensive metabolic panel     Cyclosporine          Today's Medication Changes          These changes are accurate as of 4/5/18  4:28 PM.  If you have any questions, ask your nurse or doctor.               These medicines have changed or have updated prescriptions.        Dose/Directions    cycloSPORINE modified 25 MG capsule   Commonly known as:  GENERIC EQUIVALENT   This may have changed:    - how much to take  - additional instructions  - Another medication with the same name was removed. Continue taking this medication, and follow the directions you see here.   Used for:  Aplastic anemia (H)   Changed by:  Rafita Rogel MD        Dose:  100 mg   Take 4 capsules (100 mg) by mouth 2 times daily or as directed   Quantity:  540 capsule   Refills:  6       pantoprazole 40 MG EC tablet   Commonly known as:  PROTONIX   This may have changed:  Another medication with the same name was removed. Continue taking this medication, and follow the directions you see here.   Used for:  Gastroesophageal reflux disease without esophagitis   Changed by:  Rafita Rogel MD        Dose:  40 mg   Take 1 tablet (40 mg) by mouth daily   Quantity:  30 tablet   Refills:  3            Where to get your medicines      These medications were sent to [facility] - [address]  [address] [facility] [address], [place] [address]     Phone:  [phone]     cycloSPORINE modified 25 MG capsule                Recent Review Flowsheet Data     BMT Recent Results Latest Ref Rng & Units 11/15/2017 12/14/2017 1/10/2018 1/25/2018 2/15/2018 3/14/2018 4/5/2018    WBC 4.0 - 11.0 10e9/L 2.8(L) 3.2(L) 2.9(L) 3.2(L) 2.7(L) 3.5(L) 3.5(L)    Hemoglobin 11.7 - 15.7 g/dL 8.4(L) 8.0(L) 8.8(L) 9.1(L) 8.7(L) 8.6(L) 8.8(L)    Platelet Count 150 - 450 10e9/L 49(LL) 45(LL) 51(L) 52(L)  41(LL) 53(L) 54(L)    Neutrophils (Absolute) 1.6 - 8.3 10e9/L 1.4(L) 1.4(L) 1.5(L) 1.4(L) 1.1(L) 1.6 1.7    INR 0.86 - 1.14 - - 1.01 - - - -    Sodium 133 - 144 mmol/L 139 140 142 141 139 140 140    Potassium 3.4 - 5.3 mmol/L 4.0 4.5 4.0 4.1 4.6 4.4 4.5    Chloride 94 - 109 mmol/L 108 110(H) 109 109 106 110(H) 111(H)    Glucose 70 - 99 mg/dL 88 116(H) 92 69(L) 99 110(H) 107(H)    Urea Nitrogen 7 - 30 mg/dL 39(H) 41(H) 37(H) 39(H) 30 40(H) 35(H)    Creatinine 0.52 - 1.04 mg/dL 1.38(H) 1.50(H) 1.71(H) 1.55(H) 1.26(H) 1.23(H) 1.18(H)    Calcium (Total) 8.5 - 10.1 mg/dL 8.6 8.5 8.7 9.0 8.9 8.8 8.8    Protein (Total) 6.8 - 8.8 g/dL 7.3 7.2 7.7 7.6 7.2 7.1 7.4    Albumin 3.4 - 5.0 g/dL 3.4 3.6 3.5 3.6 3.5 3.5 3.4    Bilirubin (Direct) 0.0 - 0.2 mg/dL - - - - - - -    Alkaline Phosphatase 40 - 150 U/L 79 80 81 84 79 81 84    AST 0 - 45 U/L 14 16 13 13 10 12 12    ALT 0 - 50 U/L 22 21 11 18 12 13 14    MCV 78 - 100 fl 121(H) 120(H) 120(H) 117(H) 119(H) 119(H) 117(H)               Primary Care Provider Office Phone # Fax #    Shivani Phelps PA-C [phone] [address]  [place] [address]        Equal Access to Services     CHARLES NOLASCO : Hadii erica Hines, wacharlotte lynn, eliot mahoney, ambika strong . So [facility] [phone].    ATENCIÓN: Si habla español, tiene a robertson disposición servicios gratuitos de asistencia lingüística. Llame al [phone].    We comply with applicable federal civil rights laws and [place] laws. We do not discriminate on the basis of race, color, national origin, age, disability, sex, sexual orientation, or gender identity.            Thank you!     Thank you for choosing [facility] B[facility]OD [facility] MARROW TRANSPLANT  for your care. Our goal is always to provide you with excellent care. Hearing back from our patients is one way we can continue to improve our services. Please take a few minutes to complete the written survey  that you may receive in the mail after your visit with us. Thank you!             Your Updated Medication List - Protect others around you: Learn how to safely use, store and throw away your medicines at www.disposemymeds.org.          This list is accurate as of 4/5/18  4:28 PM.  Always use your most recent med list.                   Brand Name Dispense Instructions for use Diagnosis    BENADRYL ALLERGY 25 MG tablet   Generic drug:  diphenhydrAMINE     56 tablet    Take 25 mg by mouth At Bedtime        chlorpheniramine 4 MG tablet    CHLOR-TRIMETON     Take 4 mg by mouth every 6 hours as needed. For head cold        COMPRESSION STOCKINGS     2 each    Wear compression stockings at 20-30 mmHg rating most time during the day to the affected leg (left leg) or both legs. Take them off at night.    DVT (deep venous thrombosis), left, Postphlebitic syndrome       cycloSPORINE modified 25 MG capsule    GENERIC EQUIVALENT    540 capsule    Take 4 capsules (100 mg) by mouth 2 times daily or as directed    Aplastic anemia (H)       eltrombopag 50 MG tablet    PROMACTA    90 tablet    Take 3 tablets (150 mg) by mouth daily Administer on an empty stomach, 1 hour before or 2 hours after a meal. Or as directed    Idiopathic aplastic anemia (H), Pancytopenia (H)       loratadine 10 MG tablet    CLARITIN     Take 10 mg by mouth daily as needed Reported on 4/26/2017        order for DME     1 Box    Equipment being ordered: knee high compression stockings- 18-20 mm    DVT prophylaxis       pantoprazole 40 MG EC tablet    PROTONIX    30 tablet    Take 1 tablet (40 mg) by mouth daily    Gastroesophageal reflux disease without esophagitis       TYLENOL PO      Take 325 mg by mouth every 6 hours as needed for mild pain or fever

## 2018-04-05 NOTE — PROGRESS NOTES
/86 (BP Location: Right arm, Patient Position: Sitting, Cuff Size: Adult Regular)  Pulse 83  Temp 97.8  F (36.6  C) (Oral)  Resp 16  Wt 51.7 kg (113 lb 14.4 oz)  SpO2 98%  BMI 20.83 kg/m2  Wt Readings from Last 4 Encounters:   04/05/18 51.7 kg (113 lb 14.4 oz)   03/14/18 51.4 kg (113 lb 4.8 oz)   02/15/18 51.3 kg (113 lb 3.2 oz)   01/10/18 52.4 kg (115 lb 8 oz)     Bonny returns to follow-up her aplastic anemia.  She has been taking cyclosporine and eltrombopag for a number of months and her counts are  stable since last September. She had some GI upset with nausea and other problems in February and early March but with increasing her pantoprazole dosing, her GI symptoms have been lessening.  She has had no skin rash bleeding bruising fever chills or other GI symptoms.  She has had no respiratory or cardiac complaints.  She has a mild tremor but no problems with coordination or her balance.  The rest of her review of systems is unrevealing.    Her vital signs are acceptable (her BP was up a bit on arrival but that is unusual)  and her weights been stable for some months.  She has no peripheral lymphadenopathy or edema.  She has no focal bone tenderness.  There is no rash or oral mucosal cutaneous or conjunctival petechiae.  Her lungs are clear.  Her heart tones are regular there is no gallop rhythm.  Her abdomen is soft and nontender.  She has no palpable masses or hepatosplenomegaly.  She has a fine resting tremor but no other focal neurologic findings.    Her blood counts are stable though still low and I made no changes in her medications.  We are not aiming for full dose therapeutic level cyclosporine as she developed significant renal insufficiency when that was targeted and so we are treating her with cyclosporine to ensure her creatinine is not greater than perhaps 1.2 and accepting a level below the therapeutic target of 200 and greater.    She has not needed transfusions in some time and has had  no recent infections.  Will spread out the interval of her evaluation monitoring to every 6 weeks when she will return and I will see her again in 12 weeks time.  She knows to call if additional issues arise    Rafita Rogel MD    Professor medicine    Results for LISSETH PARIKH (MRN [id number]) as of 4/5/2018 16:38   Ref. Range 3/14/2018 11:52 4/5/2018 15:41   Sodium Latest Ref Range: 133 - 144 mmol/L 140 140   Potassium Latest Ref Range: 3.4 - 5.3 mmol/L 4.4 4.5   Chloride Latest Ref Range: 94 - 109 mmol/L 110 (H) 111 (H)   Carbon Dioxide Latest Ref Range: 20 - 32 mmol/L 24 23   Urea Nitrogen Latest Ref Range: 7 - 30 mg/dL 40 (H) 35 (H)   Creatinine Latest Ref Range: 0.52 - 1.04 mg/dL 1.23 (H) 1.18 (H)   GFR Estimate Latest Ref Range: >60 mL/min/1.7m2 43 (L) 45 (L)   GFR Estimate If Black Latest Ref Range: >60 mL/min/1.7m2 52 (L) 54 (L)   Calcium Latest Ref Range: 8.5 - 10.1 mg/dL 8.8 8.8   Anion Gap Latest Ref Range: 3 - 14 mmol/L 7 7   Albumin Latest Ref Range: 3.4 - 5.0 g/dL 3.5 3.4   Protein Total Latest Ref Range: 6.8 - 8.8 g/dL 7.1 7.4   Bilirubin Total Latest Ref Range: 0.2 - 1.3 mg/dL 1.2 1.2   Alkaline Phosphatase Latest Ref Range: 40 - 150 U/L 81 84   ALT Latest Ref Range: 0 - 50 U/L 13 14   AST Latest Ref Range: 0 - 45 U/L 12 12   Glucose Latest Ref Range: 70 - 99 mg/dL 110 (H) 107 (H)   WBC Latest Ref Range: 4.0 - 11.0 10e9/L 3.5 (L) 3.5 (L)   Hemoglobin Latest Ref Range: 11.7 - 15.7 g/dL 8.6 (L) 8.8 (L)   Hematocrit Latest Ref Range: 35.0 - 47.0 % 25.6 (L) 25.8 (L)   Platelet Count Latest Ref Range: 150 - 450 10e9/L 53 (L) 54 (L)   RBC Count Latest Ref Range: 3.8 - 5.2 10e12/L 2.16 (L) 2.20 (L)   MCV Latest Ref Range: 78 - 100 fl 119 (H) 117 (H)   MCH Latest Ref Range: 26.5 - 33.0 pg 39.8 (H) 40.0 (H)   MCHC Latest Ref Range: 31.5 - 36.5 g/dL 33.6 34.1   RDW Latest Ref Range: 10.0 - 15.0 % 15.2 (H) 15.6 (H)   Diff Method Unknown Automated Method Automated Method   % Neutrophils Latest Units: % 45.2  47.8   % Lymphocytes Latest Units: % 41.0 41.2   % Monocytes Latest Units: % 9.3 6.7   % Eosinophils Latest Units: % 4.2 4.3   % Basophils Latest Units: % 0.3 0.0   % Immature Granulocytes Latest Units: % 0.0 0.0   Nucleated RBCs Latest Ref Range: 0 /100 0 0   Absolute Neutrophil Latest Ref Range: 1.6 - 8.3 10e9/L 1.6 1.7   Absolute Lymphocytes Latest Ref Range: 0.8 - 5.3 10e9/L 1.5 1.4   Absolute Monocytes Latest Ref Range: 0.0 - 1.3 10e9/L 0.3 0.2   Absolute Eosinophils Latest Ref Range: 0.0 - 0.7 10e9/L 0.2 0.2   Absolute Basophils Latest Ref Range: 0.0 - 0.2 10e9/L 0.0 0.0   Abs Immature Granulocytes Latest Ref Range: 0 - 0.4 10e9/L 0.0 0.0   Absolute Nucleated RBC Unknown 0.0 0.0   CSA level  108

## 2018-04-05 NOTE — NURSING NOTE
Chief Complaint   Patient presents with     Blood Draw     labs drawn by venipuncture by rn.  vs taken.     Labs drawn by venipuncture by rn.  Vital signs taken.  Pt checked in to next appointment.  Blank Ackerman RN

## 2018-04-26 ENCOUNTER — TELEPHONE (OUTPATIENT)
Dept: FAMILY MEDICINE | Facility: CLINIC | Age: 73
End: 2018-04-26

## 2018-04-26 DIAGNOSIS — Z00.00 ENCOUNTER FOR ROUTINE ADULT HEALTH EXAMINATION WITHOUT ABNORMAL FINDINGS: Primary | ICD-10-CM

## 2018-04-26 DIAGNOSIS — D61.9 APLASTIC ANEMIA (H): ICD-10-CM

## 2018-04-26 NOTE — TELEPHONE ENCOUNTER
Patient informed that her last physical was 2/20/14.  Transferred to the front to schedule that.  Melonie Randall CMA

## 2018-04-26 NOTE — TELEPHONE ENCOUNTER
Reason for Call: Request for an order or referral:    Order or referral being requested: Fasting labs    Date needed: Patient coming tomorrow 4/27    Has the patient been seen by the PCP for this problem? Not Applicable    Additional comments: Please place orders for labs     Phone number Patient can be reached at:  Cell number on file:    Telephone Information:   Mobile [phone]       Best Time:  any    Can we leave a detailed message on this number?  YES    Call taken on 4/26/2018 at 12:09 PM by Stacy Redding

## 2018-04-26 NOTE — TELEPHONE ENCOUNTER
Reason for Call:  Other questions    Detailed comments: patient is calling to see if she is due for a physical, mammogram, colonoscopy, when she last did FIT test, questions about what she needs to update at this time.     Phone Number Patient can be reached at: Other phone number:  [phone]  Best Time: any    Can we leave a detailed message on this number? YES    Call taken on 4/26/2018 at 9:36 AM by Adriana Huynh

## 2018-04-27 DIAGNOSIS — Z00.00 ENCOUNTER FOR ROUTINE ADULT HEALTH EXAMINATION WITHOUT ABNORMAL FINDINGS: ICD-10-CM

## 2018-04-27 LAB
CHOLEST SERPL-MCNC: 180 MG/DL
HDLC SERPL-MCNC: 30 MG/DL
LDLC SERPL CALC-MCNC: 119 MG/DL
NONHDLC SERPL-MCNC: 150 MG/DL
TRIGL SERPL-MCNC: 156 MG/DL

## 2018-04-27 PROCEDURE — 36415 COLL VENOUS BLD VENIPUNCTURE: CPT | Performed by: PHYSICIAN ASSISTANT

## 2018-04-27 PROCEDURE — 80061 LIPID PANEL: CPT | Performed by: PHYSICIAN ASSISTANT

## 2018-04-30 ENCOUNTER — OFFICE VISIT (OUTPATIENT)
Dept: FAMILY MEDICINE | Facility: CLINIC | Age: 73
End: 2018-04-30
Payer: COMMERCIAL

## 2018-04-30 ENCOUNTER — HOSPITAL ENCOUNTER (OUTPATIENT)
Dept: MAMMOGRAPHY | Facility: CLINIC | Age: 73
Discharge: HOME | End: 2018-04-30
Attending: PHYSICIAN ASSISTANT | Admitting: PHYSICIAN ASSISTANT
Payer: COMMERCIAL

## 2018-04-30 VITALS
DIASTOLIC BLOOD PRESSURE: 78 MMHG | BODY MASS INDEX: 21.52 KG/M2 | HEART RATE: 72 BPM | OXYGEN SATURATION: 97 % | HEIGHT: 61 IN | TEMPERATURE: 97.4 F | SYSTOLIC BLOOD PRESSURE: 124 MMHG | WEIGHT: 114 LBS

## 2018-04-30 DIAGNOSIS — R63.4 LOSS OF WEIGHT: ICD-10-CM

## 2018-04-30 DIAGNOSIS — Z12.31 VISIT FOR SCREENING MAMMOGRAM: ICD-10-CM

## 2018-04-30 DIAGNOSIS — E04.1 THYROID NODULE: ICD-10-CM

## 2018-04-30 DIAGNOSIS — R19.5 LOOSE STOOLS: ICD-10-CM

## 2018-04-30 DIAGNOSIS — Z00.00 ENCOUNTER FOR ROUTINE ADULT HEALTH EXAMINATION WITHOUT ABNORMAL FINDINGS: Primary | ICD-10-CM

## 2018-04-30 DIAGNOSIS — Z86.711 HISTORY OF PULMONARY EMBOLISM: ICD-10-CM

## 2018-04-30 DIAGNOSIS — Z12.11 SCREEN FOR COLON CANCER: ICD-10-CM

## 2018-04-30 DIAGNOSIS — D61.9 APLASTIC ANEMIA (H): ICD-10-CM

## 2018-04-30 PROCEDURE — G0439 PPPS, SUBSEQ VISIT: HCPCS | Performed by: PHYSICIAN ASSISTANT

## 2018-04-30 PROCEDURE — 84443 ASSAY THYROID STIM HORMONE: CPT | Performed by: PHYSICIAN ASSISTANT

## 2018-04-30 PROCEDURE — 77063 BREAST TOMOSYNTHESIS BI: CPT

## 2018-04-30 PROCEDURE — 36415 COLL VENOUS BLD VENIPUNCTURE: CPT | Performed by: PHYSICIAN ASSISTANT

## 2018-04-30 NOTE — MR AVS SNAPSHOT
After Visit Summary   4/30/2018    Bonny Raymundo    MRN: [id number]           Patient Information     Date Of Birth          1945        Visit Information        Provider Department      4/30/2018 12:00 PM Shivani Phelps PA-C [facility]        Today's Diagnoses     Encounter for routine adult health examination without abnormal findings    -  1    Aplastic anemia (H)        Loose stools        Thyroid nodule        Loss of weight        Screen for colon cancer          Care Instructions      Preventive Health Recommendations    Female Ages 65 +    Yearly exam:     See your health care provider every year in order to  o Review health changes.   o Discuss preventive care.    o Review your medicines if your doctor has prescribed any.      You no longer need a yearly Pap test unless you've had an abnormal Pap test in the past 10 years. If you have vaginal symptoms, such as bleeding or discharge, be sure to talk with your provider about a Pap test.      Every 1 to 2 years, have a mammogram.  If you are over 69, talk with your health care provider about whether or not you want to continue having screening mammograms.      Every 10 years, have a colonoscopy. Or, have a yearly FIT test (stool test). These exams will check for colon cancer.       Have a cholesterol test every 5 years, or more often if your doctor advises it.       Have a diabetes test (fasting glucose) every three years. If you are at risk for diabetes, you should have this test more often.       At age 65, have a bone density scan (DEXA) to check for osteoporosis (brittle bone disease).    Shots:    Get a flu shot each year.    Get a tetanus shot every 10 years.    Talk to your doctor about your pneumonia vaccines. There are now two you should receive - Pneumovax (PPSV 23) and Prevnar (PCV 13).    Talk to your doctor about the shingles vaccine.    Talk to your doctor about the hepatitis B vaccine.    Nutrition:     Eat at  least 5 servings of fruits and vegetables each day.      Eat whole-grain bread, whole-wheat pasta and brown rice instead of white grains and rice.      Talk to your provider about Calcium and Vitamin D.     Lifestyle    Exercise at least 150 minutes a week (30 minutes a day, 5 days a week). This will help you control your weight and prevent disease.      Limit alcohol to one drink per day.      No smoking.       Wear sunscreen to prevent skin cancer.       See your dentist twice a year for an exam and cleaning.      See your eye doctor every 1 to 2 years to screen for conditions such as glaucoma, macular degeneration and cataracts.    Shingrex is the new shingles vaccine, check with insurance.          Follow-ups after your visit        Additional Services     GASTROENTEROLOGY ADULT REF PROCEDURE ONLY [facility]ia [phone]; Dr. DELANEY Castrejon (pt has aplastic anemia, intermittent loose stools for months)       Last Lab Result: Creatinine (mg/dL)       Date                     Value                 04/05/2018               1.18 (H)         ----------  Body mass index is 21.9 kg/(m^2).      Patient will be contacted to schedule procedure.     Please be aware that coverage of these services is subject to the terms and limitations of your health insurance plan.  Call member services at your health plan with any benefit or coverage questions.  Any procedures must be performed at a [place] facility OR coordinated by your clinic's referral office.    Please bring the following with you to your appointment:    (1) Any X-Rays, CTs or MRIs which have been performed.  Contact the facility where they were done to arrange for  prior to your scheduled appointment.    (2) List of current medications   (3) This referral request   (4) Any documents/labs given to you for this referral                  Your next 10 appointments already scheduled     May 15, 2018 12:00 PM [facility]   Masonic Lab Draw with  MASONIC LAB DRAW    [facility] Masonic Lab Draw ([facility])    [address]  [address]  [place] [facility] [address]   [phone]            May 15, 2018 12:30 PM CDT   (Arrive by 12:15 PM)   Return Visit with Celine Walls PA-C   [facility] ([facility])    [address]  [address]  [place] [facility] [address]   [phone]            Jun 28, 2018  3:00 PM CDT   Masonic Lab Draw with  MASONIC LAB DRAW   [facility] Lab Draw ([facility])    [address]  [address]  [place] [facility] [address]   [phone]            Jun 28, 2018  3:30 PM CDT   RETURN ONC with Rafita Rogel MD   [facility] Blood and Marrow Transplant ([facility])    [address]  [address]  [place] [facility] [address]   [phone]              Future tests that were ordered for you today     Open Future Orders        Priority Expected Expires Ordered    US Biopsy Thyroid Fine Needle Aspiration Routine  4/30/2019 4/30/2018            Who to contact     If you have questions or need follow up information about today's clinic visit or your schedule please contact [facility] directly at [phone].  Normal or non-critical lab and imaging results will be communicated to you by MyChart, letter or phone within 4 business days after the clinic has received the results. If you do not hear from us within 7 days, please contact the clinic through MyChart or phone. If you have a critical or abnormal lab result, we will notify you by phone as soon as possible.  Submit refill requests through [email or web address] or call your pharmacy and they will forward the refill request to us. Please allow 3 business days for your refill to be completed.          Additional Information About Your Visit        [email or web address] Information     [email or web address] lets you send messages to your doctor, view your test results, renew your prescriptions,  "schedule appointments and more. To sign up, go to [email or web address] . Click on \"Log in\" on the left side of the screen, which will take you to the Welcome page. Then click on \"Sign up Now\" on the right side of the page.     You will be asked to enter the access code listed below, as well as some personal information. Please follow the directions to create your username and password.     Your access code is: [email or web address]  Expires: 2018  4:28 PM     Your access code will  in 90 days. If you need help or a new code, please call your [facility] clinic or [phone].        Care EveryWhere ID     This is your Care EveryWhere ID. This could be used by other organizations to access your [facility] medical records  [id number]        Your Vitals Were     Pulse Temperature Height Pulse Oximetry Breastfeeding? BMI (Body Mass Index)    72 97.4  F (36.3  C) (Oral) 5' 0.5\" (1.537 m) 97% No 21.9 kg/m2       Blood Pressure from Last 3 Encounters:   18 124/78   18 144/86   18 132/79    Weight from Last 3 Encounters:   18 114 lb (51.7 kg)   18 113 lb 14.4 oz (51.7 kg)   18 113 lb 4.8 oz (51.4 kg)              We Performed the Following     GASTROENTEROLOGY ADULT REF PROCEDURE ONLY Teresa Murguia [phone]; Dr. DELANEY Castrejon (pt has aplastic anemia, intermittent loose stools for months)     TSH with free T4 reflex        Primary Care Provider Office Phone # Fax #    Shivani Phelps PA-C [phone] [address]  [place] [address]        Equal Access to Services     CHARLES NOLASCO : Misty Hines, wacharlotte lynn, qaybta kaalambika jimenes. [facility] [phone].    ATENCIÓN: Si habla español, tiene a robertson disposición servicios gratuitos de asistencia lingüística. Llame al [phone].    We comply with applicable federal civil rights laws and [place] laws. We do not discriminate on the basis " of race, color, national origin, age, disability, sex, sexual orientation, or gender identity.            Thank you!     Thank you for choosing [facility]  for your care. Our goal is always to provide you with excellent care. Hearing back from our patients is one way we can continue to improve our services. Please take a few minutes to complete the written survey that you may receive in the mail after your visit with us. Thank you!             Your Updated Medication List - Protect others around you: Learn how to safely use, store and throw away your medicines at www.disposemymeds.org.          This list is accurate as of 4/30/18 12:45 PM.  Always use your most recent med list.                   Brand Name Dispense Instructions for use Diagnosis    BENADRYL ALLERGY 25 MG tablet   Generic drug:  diphenhydrAMINE     56 tablet    Take 25 mg by mouth At Bedtime        chlorpheniramine 4 MG tablet    CHLOR-TRIMETON     Take 4 mg by mouth every 6 hours as needed. For head cold        COMPRESSION STOCKINGS     2 each    Wear compression stockings at 20-30 mmHg rating most time during the day to the affected leg (left leg) or both legs. Take them off at night.    DVT (deep venous thrombosis), left, Postphlebitic syndrome       cycloSPORINE modified 25 MG capsule    GENERIC EQUIVALENT    540 capsule    Take 4 capsules (100 mg) by mouth 2 times daily or as directed    Aplastic anemia (H)       eltrombopag 50 MG tablet    PROMACTA    90 tablet    Take 3 tablets (150 mg) by mouth daily Administer on an empty stomach, 1 hour before or 2 hours after a meal. Or as directed    Idiopathic aplastic anemia (H), Pancytopenia (H)       loratadine 10 MG tablet    CLARITIN     Take 10 mg by mouth daily as needed Reported on 4/26/2017        order for DME     1 Box    Equipment being ordered: knee high compression stockings- 18-20 mm    DVT prophylaxis       pantoprazole 40 MG EC tablet    PROTONIX    30 tablet    Take 1 tablet  (40 mg) by mouth daily    Gastroesophageal reflux disease without esophagitis       TYLENOL PO      Take 325 mg by mouth every 6 hours as needed for mild pain or fever

## 2018-04-30 NOTE — LETTER
[facility]  [address]  [address]  [place]  [address]  Tel: [phone]    May 1, 2018    Bonny Raymundo  [address] [facility] [address]      Bonny,    Your thyroid blood test was normal.    If you have any further questions or problems, please contact our office.      Sincerely,    Shivani Phelps PA-C / [facility]        Resulted Orders   TSH with free T4 reflex   Result Value Ref Range    TSH 1.08 0.40 - 4.00 mU/L

## 2018-04-30 NOTE — PATIENT INSTRUCTIONS

## 2018-04-30 NOTE — PROGRESS NOTES
SUBJECTIVE:   Bonny Raymundo is a 72 year old female who presents for Preventive Visit.    Pt has loose stools for months off and on.  She has some unint wt loss as well.  Would like a colonoscopy.   Admits to decreased appetite which is not new.  Last colonoscopy was in 2003.  She is followed by oncology for aplastic anemia.  She has a thyroid nodule that was discovered last year and FNA was ordered but she refused to do it then; agrees to do this now.    TSH   Date Value Ref Range Status   06/14/2017 1.16 0.40 - 4.00 mU/L Final     Are you in the first 12 months of your Medicare Part B coverage?  No    Healthy Habits:    Do you get at least three servings of calcium containing foods daily (dairy, green leafy vegetables, etc.)? Not every day , not currently     Amount of exercise or daily activities, outside of work: variable day(s) per week, caution due to anemia , walk dog ever day, yoga at home and yoga class     Problems taking medications regularly No    Medication side effects: No    Have you had an eye exam in the past two years? yes    Do you see a dentist twice per year? yes    Do you have sleep apnea, excessive snoring or daytime drowsiness?no      Ability to successfully perform activities of daily living: Yes, no assistance needed    Home safety:  lack of handrails on stairs     Hearing loss: YES, in process of aquiring hearing aids         Fall risk:  Fallen 2 or more times in the past year?: No  Any fall with injury in the past year?: No        COGNITIVE SCREEN  1) Repeat 3 items (Banana, Sunrise, Chair)    2) Clock draw: NORMAL  3) 3 item recall: Recalls 3 objects  Results: 3 items recalled: COGNITIVE IMPAIRMENT LESS LIKELY    Mini-CogTM Copyright S Mario. Licensed by the author for use in [facility]; reprinted with permission (ashley@.[facility]). All rights reserved.    Reviewed and updated as needed this visit by clinical staff  Tobacco  Allergies  Meds  Soc Hx        Reviewed and  updated as needed this visit by Provider  Tobacco  Allergies  Meds        Social History   Substance Use Topics     Smoking status: Former Smoker     Quit date: 5/14/1973     Smokeless tobacco: Never Used     Alcohol use No      Comment: occasionally       If you drink alcohol do you typically have >3 drinks per day or >7 drinks per week?                         Today's PHQ-2 Score:   PHQ-2 ( 1999 [employer]) 4/30/2018 1/10/2018   Q1: Little interest or pleasure in doing things 0 0   Q2: Feeling down, depressed or hopeless 0 0   PHQ-2 Score 0 0       Do you feel safe in your environment - Yes  Current providers sharing in care for this patient include:   Patient Care Team:  Shivani Phelps PA-C as PCP - General (Internal Medicine)  Aly Elliott MD as MD (Family Practice)  Rafita Rogel MD as MD (Internal Medicine)  Светлана Nolasco, RN as Nurse Coordinator (Hematology & Oncology)    The following health maintenance items are reviewed in Epic and correct as of today:  Health Maintenance   Topic Date Due     MAMMO SCREEN Q2 YR (SYSTEM ASSIGNED)  02/20/2016     FIT Q1 YR  05/11/2016     FALL RISK ASSESSMENT  01/10/2019     ADVANCE DIRECTIVE PLANNING Q5 YRS  08/07/2020     LIPID SCREEN Q5 YR FEMALE (SYSTEM ASSIGNED)  04/27/2023     TETANUS IMMUNIZATION (SYSTEM ASSIGNED)  08/17/2023     DEXA SCAN SCREENING (SYSTEM ASSIGNED)  Completed     PNEUMOCOCCAL  Completed     INFLUENZA VACCINE  Completed     Past Medical History:   Diagnosis Date     Allergic rhinitis due to other allergen      Aplastic anemia (H) 1/9/2017     Closed anterior dislocation of humerus      DVT of lower extremity (deep venous thrombosis) (H) 10-12    Left after prolonged sitting-plane     DVT, recurrent, lower extremity, acute (H) 2014     Headache(784.0)      Osteoporosis, unspecified      Pulmonary emboli (H) 10-12     Sensorineural hearing loss, unspecified      Sprain of ankle, unspecified site     L     Past Surgical  History:   Procedure Laterality Date     ARTHRODESIS FOOT  11    Hallux valgus R-1st MP joint     BLEPHAROPLASTY BILATERAL  ,      BONE MARROW BIOPSY, BONE SPECIMEN, NEEDLE/TROCAR N/A 2016    Procedure: BIOPSY BONE MARROW;  Surgeon: Mu Morgan MD;  Location: [facility]     BONE MARROW BIOPSY, BONE SPECIMEN, NEEDLE/TROCAR N/A 2016    Procedure: BIOPSY BONE MARROW;  Surgeon: Mu Morgan MD;  Location: [facility]     C DEXA INTERPRETATION, AXIAL  03     C NONSPECIFIC PROCEDURE           C NONSPECIFIC PROCEDURE      hysterectomy/BSO     C NONSPECIFIC PROCEDURE      myomectomy (fibroids)     CATARACT IOL, RT/LT Right      CATARACT IOL, RT/LT Left      EXCHANGE INTRAOCULAR LENS IMPLANT Right 2015    Procedure: EXCHANGE INTRAOCULAR LENS IMPLANT;  Surgeon: Garrett Dawson MD;  [facility]: [facility] COLONOSCOPY THRU STOMA, DIAGNOSTIC      normal- minimal diverticulosis     PICC INSERTION Left 2017    5fr DL BioFlo PICC, 42cm (2cm external) in the L basilic vein w/ tip in the  SVC RA junction.     VITRECTOMY PARSPLANA WITH 23 GAUGE SYSTEM Right 2015    Procedure: VITRECTOMY PARSPLANA WITH 23 GAUGE SYSTEM;  Surgeon: Racheal Loyd MD;  Location: [facility]     Current Outpatient Prescriptions   Medication Sig Dispense Refill     Acetaminophen (TYLENOL PO) Take 325 mg by mouth every 6 hours as needed for mild pain or fever       chlorpheniramine (CHLOR-TRIMETON) 4 MG tablet Take 4 mg by mouth every 6 hours as needed. For head cold        COMPRESSION STOCKINGS Wear compression stockings at 20-30 mmHg rating most time during the day to the affected leg (left leg) or both legs. Take them off at night. 2 each 2     cycloSPORINE modified (GENERIC EQUIVALENT) 25 MG capsule Take 4 capsules (100 mg) by mouth 2 times daily or as directed 540 capsule 6     diphenhydrAMINE (BENADRYL ALLERGY) 25 MG tablet Take 25 mg by mouth At Bedtime  56 tablet   "    eltrombopag (PROMACTA) 50 MG tablet Take 3 tablets (150 mg) by mouth daily Administer on an empty stomach, 1 hour before or 2 hours after a meal. Or as directed 90 tablet 6     loratadine (CLARITIN) 10 MG tablet Take 10 mg by mouth daily as needed Reported on 4/26/2017       ORDER FOR DME Equipment being ordered: knee high compression stockings- 18-20 mm 1 Box 1     pantoprazole (PROTONIX) 40 MG EC tablet Take 1 tablet (40 mg) by mouth daily 30 tablet 3       ROS:  Constitutional, HEENT, cardiovascular, pulmonary, GI, , musculoskeletal, neuro, skin, endocrine and psych systems are negative, except as otherwise noted.    OBJECTIVE:   /78  Pulse 72  Temp 97.4  F (36.3  C) (Oral)  Ht 5' 0.5\" (1.537 m)  Wt 114 lb (51.7 kg)  SpO2 97%  Breastfeeding? No  BMI 21.9 kg/m2 Estimated body mass index is 21.9 kg/(m^2) as calculated from the following:    Height as of this encounter: 5' 0.5\" (1.537 m).    Weight as of this encounter: 114 lb (51.7 kg).  EXAM:   GENERAL APPEARANCE: healthy, alert and no distress  EYES: Eyes grossly normal to inspection, PERRL and conjunctivae and sclerae normal  HENT: ear canals and TM's normal, nose and mouth without ulcers or lesions, oropharynx clear and oral mucous membranes moist  NECK: no adenopathy, no asymmetry, masses, or scars and thyroid normal to palpation  RESP: lungs clear to auscultation - no rales, rhonchi or wheezes  BREAST: normal without masses, tenderness or nipple discharge and no palpable axillary masses or adenopathy  CV: regular rate and rhythm, normal S1 S2, no S3 or S4, no murmur, click or rub, no peripheral edema and peripheral pulses strong  ABDOMEN: soft, nontender, no hepatosplenomegaly, no masses and bowel sounds normal  MS: no musculoskeletal defects are noted and gait is age appropriate without ataxia  SKIN: no suspicious lesions or rashes  NEURO: Normal strength and tone, sensory exam grossly normal, mentation intact and speech normal  PSYCH: " "mentation appears normal and affect normal/bright    ASSESSMENT / PLAN:   Assessment and Plan:     (Z00.00) Encounter for routine adult health examination without abnormal findings  (primary encounter diagnosis)  Comment:  Plan: printed out copy of lipids done prior to appt and discussed. Recd she go for her mammogram today. Recd she check with insurance re: Shingrex    (D61.9) Aplastic anemia (H)  Comment:   Plan: per hematology    (R19.5) Loose stools  Comment:   Plan: GASTROENTEROLOGY ADULT REF PROCEDURE ONLY         [facility]  [phone]; Dr. DELANEY Castrejon (pt has aplastic anemia, intermittent         loose stools for months)            (E04.1) Thyroid nodule  Comment: she never the FNA that was ordered last year.  Plan: TSH with free T4 reflex, US Biopsy Thyroid Fine        Needle Aspiration            (R63.4) Loss of weight  Comment:   Plan: likely due to decreased appetite, but will check TSH today and have her get the FNA.    (Z86.711) History of pulmonary embolism  Comment:   Plan: completed 6 months of anticoagulation    (Z12.11) Screen for colon cancer  Comment:   Plan: GASTROENTEROLOGY ADULT REF PROCEDURE ONLY         [facility] [phone]; Dr. DELANEY Castrejon (pt has aplastic anemia, intermittent         loose stools for months)            End of Life Planning:  Patient currently has an advanced directive: No.  I have verified the patient's ablity to prepare an advanced directive/make health care decisions.  Literature was provided to assist patient in preparing an advanced directive.    COUNSELING:  Reviewed preventive health counseling, as reflected in patient instructions        Estimated body mass index is 21.9 kg/(m^2) as calculated from the following:    Height as of this encounter: 5' 0.5\" (1.537 m).    Weight as of this encounter: 114 lb (51.7 kg).       reports that she quit smoking about 44 years ago. She has never used smokeless tobacco.      Appropriate " preventive services were discussed with this patient, including applicable screening as appropriate for cardiovascular disease, diabetes, osteopenia/osteoporosis, and glaucoma.  As appropriate for age/gender, discussed screening for colorectal cancer, prostate cancer, breast cancer, and cervical cancer. Checklist reviewing preventive services available has been given to the patient.    Reviewed patients plan of care and provided an AVS. The Basic Care Plan (routine screening as documented in Health Maintenance) for Bonny meets the Care Plan requirement. This Care Plan has been established and reviewed with the Patient.    Counseling Resources:  ATP IV Guidelines  Pooled Cohorts Equation Calculator  Breast Cancer Risk Calculator  FRAX Risk Assessment  ICSI Preventive Guidelines  Dietary Guidelines for Americans, 2010  USDA's MyPlate  ASA Prophylaxis  Lung CA Screening    Shivani Phelps PA-C  [facility]

## 2018-05-01 LAB — TSH SERPL DL<=0.005 MIU/L-ACNC: 1.08 MU/L (ref 0.4–4)

## 2018-05-07 ENCOUNTER — TELEPHONE (OUTPATIENT)
Dept: INTERVENTIONAL RADIOLOGY/VASCULAR | Facility: CLINIC | Age: 73
End: 2018-05-07

## 2018-05-07 NOTE — TELEPHONE ENCOUNTER
Interventional Radiology   Interventional Radiology at [facility] has been requested to perform a Left thyroid nodule FNA from Shivani RAYA on 5/2/18.  Bonny Raymundo is a 72 year old woman with a history of Aplastic anemia, DVT and PE (2012), osteoporosis and a left thyroid nodule which was noted on imaging last year. A thyroid ultrasound was done which showed a 1.8 X 1.9 cm thyroid nodule. A thyroid nodule FNA was ordered last year but the patient did not want to have it done. She had her annual physical recently and agreed to get the FNA done this year.   No new imaging was done since 5/2017.   Central scheduling will contact the patient to schedule the biopsy.     Thanks [facility] Interventional Radiology CNP ([phone])

## 2018-05-15 ENCOUNTER — APPOINTMENT (OUTPATIENT)
Dept: LAB | Facility: CLINIC | Age: 73
End: 2018-05-15
Attending: PHYSICIAN ASSISTANT
Payer: COMMERCIAL

## 2018-05-15 ENCOUNTER — ONCOLOGY VISIT (OUTPATIENT)
Dept: ONCOLOGY | Facility: CLINIC | Age: 73
End: 2018-05-15
Attending: PHYSICIAN ASSISTANT
Payer: COMMERCIAL

## 2018-05-15 VITALS
TEMPERATURE: 97.9 F | WEIGHT: 116.7 LBS | HEART RATE: 64 BPM | RESPIRATION RATE: 18 BRPM | SYSTOLIC BLOOD PRESSURE: 159 MMHG | OXYGEN SATURATION: 98 % | DIASTOLIC BLOOD PRESSURE: 81 MMHG | BODY MASS INDEX: 22.42 KG/M2

## 2018-05-15 DIAGNOSIS — D61.3 IDIOPATHIC APLASTIC ANEMIA (H): ICD-10-CM

## 2018-05-15 DIAGNOSIS — D61.818 PANCYTOPENIA (H): ICD-10-CM

## 2018-05-15 LAB
ALBUMIN SERPL-MCNC: 3.8 G/DL (ref 3.4–5)
ALP SERPL-CCNC: 86 U/L (ref 40–150)
ALT SERPL W P-5'-P-CCNC: 14 U/L (ref 0–50)
ANION GAP SERPL CALCULATED.3IONS-SCNC: 7 MMOL/L (ref 3–14)
AST SERPL W P-5'-P-CCNC: 15 U/L (ref 0–45)
BASOPHILS # BLD AUTO: 0 10E9/L (ref 0–0.2)
BASOPHILS NFR BLD AUTO: 0.3 %
BILIRUB SERPL-MCNC: 1.1 MG/DL (ref 0.2–1.3)
BUN SERPL-MCNC: 36 MG/DL (ref 7–30)
CALCIUM SERPL-MCNC: 8.7 MG/DL (ref 8.5–10.1)
CHLORIDE SERPL-SCNC: 111 MMOL/L (ref 94–109)
CO2 SERPL-SCNC: 22 MMOL/L (ref 20–32)
CREAT SERPL-MCNC: 1.18 MG/DL (ref 0.52–1.04)
DIFFERENTIAL METHOD BLD: ABNORMAL
EOSINOPHIL # BLD AUTO: 0.3 10E9/L (ref 0–0.7)
EOSINOPHIL NFR BLD AUTO: 9.3 %
ERYTHROCYTE [DISTWIDTH] IN BLOOD BY AUTOMATED COUNT: 14.6 % (ref 10–15)
GFR SERPL CREATININE-BSD FRML MDRD: 45 ML/MIN/1.7M2
GLUCOSE SERPL-MCNC: 90 MG/DL (ref 70–99)
HCT VFR BLD AUTO: 26.7 % (ref 35–47)
HGB BLD-MCNC: 8.9 G/DL (ref 11.7–15.7)
IMM GRANULOCYTES # BLD: 0 10E9/L (ref 0–0.4)
IMM GRANULOCYTES NFR BLD: 0.3 %
LYMPHOCYTES # BLD AUTO: 1.6 10E9/L (ref 0.8–5.3)
LYMPHOCYTES NFR BLD AUTO: 51.1 %
MCH RBC QN AUTO: 39.9 PG (ref 26.5–33)
MCHC RBC AUTO-ENTMCNC: 33.3 G/DL (ref 31.5–36.5)
MCV RBC AUTO: 120 FL (ref 78–100)
MONOCYTES # BLD AUTO: 0.2 10E9/L (ref 0–1.3)
MONOCYTES NFR BLD AUTO: 6.2 %
NEUTROPHILS # BLD AUTO: 1.1 10E9/L (ref 1.6–8.3)
NEUTROPHILS NFR BLD AUTO: 32.8 %
NRBC # BLD AUTO: 0 10*3/UL
NRBC BLD AUTO-RTO: 0 /100
PLATELET # BLD AUTO: 51 10E9/L (ref 150–450)
POTASSIUM SERPL-SCNC: 4.4 MMOL/L (ref 3.4–5.3)
PROT SERPL-MCNC: 7.4 G/DL (ref 6.8–8.8)
RBC # BLD AUTO: 2.23 10E12/L (ref 3.8–5.2)
SODIUM SERPL-SCNC: 140 MMOL/L (ref 133–144)
WBC # BLD AUTO: 3.2 10E9/L (ref 4–11)

## 2018-05-15 PROCEDURE — 85025 COMPLETE CBC W/AUTO DIFF WBC: CPT | Performed by: INTERNAL MEDICINE

## 2018-05-15 PROCEDURE — G0463 HOSPITAL OUTPT CLINIC VISIT: HCPCS | Mod: ZF

## 2018-05-15 PROCEDURE — 36415 COLL VENOUS BLD VENIPUNCTURE: CPT

## 2018-05-15 PROCEDURE — 99214 OFFICE O/P EST MOD 30 MIN: CPT | Mod: ZP | Performed by: PHYSICIAN ASSISTANT

## 2018-05-15 PROCEDURE — 80053 COMPREHEN METABOLIC PANEL: CPT | Performed by: INTERNAL MEDICINE

## 2018-05-15 ASSESSMENT — PAIN SCALES - GENERAL: PAINLEVEL: MILD PAIN (2)

## 2018-05-15 NOTE — NURSING NOTE
"Oncology Rooming Note    May 15, 2018 4:13 PM   Bonny Raymundo is a 72 year old female who presents for:    Chief Complaint   Patient presents with     Blood Draw     extra red gel and purple sent for cyclosporine please place orders asa     Oncology Clinic Visit     Aplastic Anemia;      Initial Vitals: /81  Pulse 64  Temp 97.9  F (36.6  C)  Resp 18  Wt 52.9 kg (116 lb 11.2 oz)  SpO2 98%  BMI 22.42 kg/m2 Estimated body mass index is 22.42 kg/(m^2) as calculated from the following:    Height as of 4/30/18: 1.537 m (5' 0.5\").    Weight as of this encounter: 52.9 kg (116 lb 11.2 oz). Body surface area is 1.5 meters squared.  Mild Pain (2) Comment: backache   No LMP recorded. Patient has had a hysterectomy.  Allergies reviewed: Yes  Medications reviewed: Yes    Medications: Medication refills not needed today.  Pharmacy name entered into [facility]:    [facility], [place] - [address]  [facility], [place] - [address]  [facility], [address]  WRITTEN PRESCRIPTION REQUESTED    Clinical concerns: Patient states there are no new concerns to discuss with provider.  Sejal Walls was not notified.       8 minutes for nursing intake (face to face time)     Kayla Burger CMA              "

## 2018-05-15 NOTE — MR AVS SNAPSHOT
After Visit Summary   5/15/2018    Bonny Raymundo    MRN: [id number]           Patient Information     Date Of Birth          1945        Visit Information        Provider Department      5/15/2018 3:50 PM Celine Walls PA-C [facility]        Today's Diagnoses     Pancytopenia (H)        Idiopathic aplastic anemia (H)           Follow-ups after your visit        Your next 10 appointments already scheduled     May 21, 2018  9:30 AM CDT   US BIOPSY THYROID FINE NEEDLE ASPIRATION with SHUS4, SH BODY RAD   [facility] Ultrasound ([facility])    [address]  [place] [address]   [phone]           Bring a list of your medicines to the exam. Include vitamins, minerals and over-the-counter drugs.  Tell your doctor in advance:   If you are or may be pregnant.   If you are taking Coumadin (or any other blood thinners) 5 days prior to the exam for any special instructions.  IF YOUR DOCTOR HAS TOLD YOU THAT YOU WILL BE RECEIVING SEDATION (medicine to help you relax): (Typically sedation is only for liver exams at [facility] and Renal Biopsy exams in Pediatrics)   See your family doctor for an exam within 30 days of treatment.   Plan for an adult to drive you home and stay with you for at least 24 hours.   No eating or drinking for 4 hours before your test. You may take medicine with small sips of water.   If you have diabetes:If you take insulin, call your diabetes care team for any special instructions for this exam.  Please call the Imaging Department at your exam site with any questions.             May 24, 2018   Procedure with Meliton Castrejon MD   [facility] Endoscopy ([facility])    [address]  [place] [address]   [phone]           [facility] is located at [address] [place]            Jun 28, 2018  3:00 PM CDT   [facility] Draw with  [facility] DRAW  "  [facility])    [address]  [address]  [place] [address]   [phone]            Jun 28, 2018  3:30 PM CDT   RETURN ONC with Rafita Rogel MD   [facility] Blood and Marrow Transplant ([facility])    [address]  [address]  [place] [address]   [phone]            Aug 08, 2018 11:00 AM CDT   [facility]RAW   [facility] Lab Draw ([facility])    [address]  [address]  [place] [address]   [phone]            Aug 08, 2018 11:40 AM CDT   (Arrive by 11:25 AM)   Return Visit with Celine Walls PA-C   [facility] ([facility])    [address] [place] [address]  [address]  [place] [facility] [address]   [phone]              Who to contact     If you have questions or need follow up information about today's clinic visit or your schedule please contact [facility] directly at [phone].  Normal or non-critical lab and imaging results will be communicated to you by MyChart, letter or phone within 4 business days after the clinic has received the results. If you do not hear from us within 7 days, please contact the clinic through MyChart or phone. If you have a critical or abnormal lab result, we will notify you by phone as soon as possible.  Submit refill requests through goBalto or call your pharmacy and they will forward the refill request to us. Please allow 3 business days for your refill to be completed.          Additional Information About Your Visit        goBalto Information     goBalto lets you send messages to your doctor, view your test results, renew your prescriptions, schedule appointments and more. To sign up, go to [email or web address] . Click on \"Log in\" on the left side of the screen, which will take you to the Welcome page. Then click on " "\"Sign up Now\" on the right side of the page.     You will be asked to enter the access code listed below, as well as some personal information. Please follow the directions to create your username and password.     Your access code is: [email or web address]  Expires: 2018  4:28 PM     Your access code will  in 90 days. If you need help or a new code, please call your [facility] or [phone].        Care EveryWhere ID     This is your Care EveryWhere ID. This could be used by other organizations to access your [place] medical records  [id number]        Your Vitals Were     Pulse Temperature Respirations Pulse Oximetry BMI (Body Mass Index)       64 97.9  F (36.6  C) 18 98% 22.42 kg/m2        Blood Pressure from Last 3 Encounters:   05/15/18 159/81   18 124/78   18 144/86    Weight from Last 3 Encounters:   05/15/18 52.9 kg (116 lb 11.2 oz)   18 51.7 kg (114 lb)   18 51.7 kg (113 lb 14.4 oz)              We Performed the Following     CBC with platelets differential     Comprehensive metabolic panel        Primary Care Provider Office Phone # Fax #    Shivani Phelps PA-C [phone] [address]  [place] [address]        Equal Access to Services     [facility]: Hadii aad ku hadasho Somichael, waaxda luqadaha, qaybta kaalmada adecee, ambika strong . So [facility] [phone].    ATENCIÓN: Si habla español, tiene a robertson disposición servicios gratuitos de asistencia lingüística. Llame al [phone].    We comply with applicable federal civil rights laws and [place] laws. We do not discriminate on the basis of race, color, national origin, age, disability, sex, sexual orientation, or gender identity.            Thank you!     Thank you for choosing [facility]  for your care. Our goal is always to provide you with excellent care. Hearing back from our patients is one way we can continue to improve our " services. Please take a few minutes to complete the written survey that you may receive in the mail after your visit with us. Thank you!             Your Updated Medication List - Protect others around you: Learn how to safely use, store and throw away your medicines at www.disposemymeds.org.          This list is accurate as of 5/15/18  9:26 PM.  Always use your most recent med list.                   Brand Name Dispense Instructions for use Diagnosis    BENADRYL ALLERGY 25 MG tablet   Generic drug:  diphenhydrAMINE     56 tablet    Take 25 mg by mouth At Bedtime        Calcium + D3 600-200 MG-UNIT Tabs     60 tablet    Take 1 tablet by mouth 2 times daily        chlorpheniramine 4 MG tablet    CHLOR-TRIMETON     Take 4 mg by mouth every 6 hours as needed. For head cold        COMPRESSION STOCKINGS     2 each    Wear compression stockings at 20-30 mmHg rating most time during the day to the affected leg (left leg) or both legs. Take them off at night.    DVT (deep venous thrombosis), left, Postphlebitic syndrome       cycloSPORINE modified 25 MG capsule    GENERIC EQUIVALENT    540 capsule    Take 4 capsules (100 mg) by mouth 2 times daily or as directed    Aplastic anemia (H)       eltrombopag 50 MG tablet    PROMACTA    90 tablet    Take 3 tablets (150 mg) by mouth daily Administer on an empty stomach, 1 hour before or 2 hours after a meal. Or as directed    Idiopathic aplastic anemia (H), Pancytopenia (H)       loratadine 10 MG tablet    CLARITIN     Take 10 mg by mouth daily as needed Reported on 4/26/2017        order for DME     1 Box    Equipment being ordered: knee high compression stockings- 18-20 mm    DVT prophylaxis       pantoprazole 40 MG EC tablet    PROTONIX    30 tablet    Take 1 tablet (40 mg) by mouth daily    Gastroesophageal reflux disease without esophagitis       TYLENOL PO      Take 325 mg by mouth every 6 hours as needed for mild pain or fever

## 2018-05-15 NOTE — NURSING NOTE
done by RN, pt tolerated well, labs collected and sent, VS taken and pt checked in for next appt.   Jes Virk

## 2018-05-15 NOTE — NURSING NOTE
done by RN, pt tolerated well, labs collected and sent, VS taken and pt checked in for next appt.   Jes Virk  Cyclosporine last taken 5/14/18 at 2300 pt reported. Jes Virk

## 2018-05-15 NOTE — PROGRESS NOTES
[facility]CER CLINIC  FOLLOW-UP VISIT NOTE  Date of visit: May 15, 2018            REASON FOR VISIT: Aplastic anemia, routine 6 week follow up    HPI: Bonny is a 72 year old female who presented in the fall of 2016 with fatigue and shortness of breath. 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  Her counts showed pancytopenia and BMBX was concerning for aplastic anemia.  By December of 2016 she was transfusion dependent with platelets under 10 k and ANC dropped under 500. 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  She has met with Dr Mcdaniel at [facility] and consulted with Dr Rogel at [facility].      After further consultation it was decided to admit on 1/9/17 for ATG/cyclosporine/prednisone therapy.     The following was her inpatient regimen:  Day 1= 1/9/17  ATG 2500 mg (40 mg/kg0 q24 hours D-4)  Cyclosporine 200 mg (3 mg/kg) PO bid  Eltrombopag 50 mg daily  Methylpred 31 mg (0.5 mg/kg) q24 hours D1-4  Prednisone 60 mg (1 mg/kg) daily after completion of IV methylpred to continue for at least 2 weeks      INTERVAL HISTORY: Bonny is here today for her q6w follow up. The last time I saw her she had some N/V post getting her tooth pulled.  This seemed to resolve and she has been eating and drinking well.  She doesn't have issues with gagging/dyphagia like she has had in the past.  She had previously completed PT last fall with attaining her goals of improved ambulation, since its a new year she almost wishes she could do PT again- she felt it held her accountable to exercise. She is walking further with the dog- 6 blocks now and can get through [employer] and the grocery store without issues.  Her stamina is stable.     No fevers, chest pain and and no bleeding. Daily BM, no black/blood. No  issues. She has osteoporosis and needs to get on a bisphosphonate at some point, but that will have to be put off with the dental issues.  She has  a thyroid biopsy and colonoscopy this next week. She had a mammo done which was normal.  She still has some right sided back pain, but it is considerable better than last year.     ROS: complete review of systems was performed which was negative unless noted above.    EXAM:  /81  Pulse 64  Temp 97.9  F (36.6  C)  Resp 18  Wt 52.9 kg (116 lb 11.2 oz)  SpO2 98%  BMI 22.42 kg/m2  Wt Readings from Last 4 Encounters:   05/15/18 52.9 kg (116 lb 11.2 oz)   04/30/18 51.7 kg (114 lb)   04/05/18 51.7 kg (113 lb 14.4 oz)   03/14/18 51.4 kg (113 lb 4.8 oz)     General: Patient alert and oriented x3.   EYES: PERRLA, conjunctiva pink  Neck: No palpable cervical, supraclavicular or axillary nodes bilaterally.   Cardiovascular: RRR, no murmurs, gallops or rubs  Respiratory: clear to auscultation bilaterally;  no crackles, rhonchi or wheezing.   Abdomen: positive bowel sounds in all four quadrants, soft, nontender  Neuro: grossly intact.   Mood and affect is stable.       LABS:      2/15/2018 16:28 3/14/2018 11:52 4/5/2018 15:41 5/15/2018 15:49   WBC 2.7 (L) 3.5 (L) 3.5 (L) 3.2 (L)   Hemoglobin 8.7 (L) 8.6 (L) 8.8 (L) 8.9 (L)   Hematocrit 26.9 (L) 25.6 (L) 25.8 (L) 26.7 (L)   Platelet Count 41 (LL) 53 (L) 54 (L) 51 (L)   RBC Count 2.26 (L) 2.16 (L) 2.20 (L) 2.23 (L)    (H) 119 (H) 117 (H) 120 (H)   Absolute Neutrophil 1.1 (L) 1.6 1.7 1.1 (L)        3/14/2018 11:52 4/5/2018 15:41 5/15/2018 15:49   Sodium 140 140 140   Potassium 4.4 4.5 4.4   Chloride 110 (H) 111 (H) 111 (H)   Carbon Dioxide 24 23 22   Urea Nitrogen 40 (H) 35 (H) 36 (H)   Creatinine 1.23 (H) 1.18 (H) 1.18 (H)   GFR Estimate 43 (L) 45 (L) 45 (L)   GFR Estimate If Black 52 (L) 54 (L) 54 (L)   Calcium 8.8 8.8 8.7   Anion Gap 7 7 7   Albumin 3.5 3.4 3.8   Protein Total 7.1 7.4 7.4   Bilirubin Total 1.2 1.2 1.1   Alkaline Phosphatase 81 84 86   ALT 13 14 14   AST 12 12 15     ASSESSMENT/PLAN: 72 year old female with AA, now has been transfusion  INDEPENDENT since April 2017.    HEME- Treatment for AA 1/9-1/13/17 with ATG, methylpred, cyclosporine and eltrombopag. Has been off her prednisone and ppx medications since spring.  Her stamina has improved since I saw her last and counts are now independent from transfusions since April of 2017. Her CSA levels have been subtherapeutic however she developed renal toxicity with higher doses so we are keeping her at the following doses:  -cyclosporine 100 mg BID  -eltrombopag 150 mg daily  -counts  q6 w  -transfuse if hgb <7.5 or symptomatic and platelets <30k (or prior to dental issues)  -q4-6 weeks for labs/visits    Renal: Creat has improved with the lower dose of CSA.  Continue hydration.      HEME-two prior provoked DVT in 2012 and 2014 with travel. 2012 was associated with PE. Was on warfarin/lovenox in the past.  Transitioned to ASA.  Off all BP medications    ID-  No fever or symptoms of infection. No ppx medications, CMV was neg.  Completed pentamidine in 2017.    MSK- low back -Low thoracic mid spine pain which has significantly improved.  Has h/o compression fractures and has osteoporosis.  She will need Prolia once she is finished with her dental work. She would like to see ortho, OK for [facility]    FEN: bobby improved, weight stable.      GI: has GERD and we tried to dose reduce her, failed attempt, staying at 40 mg daily    Health maintenance: sees pcp routinely.  Thyroid biopsy, colonoscopy next week.  Theresa MART.     Sejal Walls PA-C

## 2018-05-21 ENCOUNTER — HOSPITAL ENCOUNTER (OUTPATIENT)
Dept: ULTRASOUND IMAGING | Facility: CLINIC | Age: 73
End: 2018-05-21
Attending: PHYSICIAN ASSISTANT | Admitting: RADIOLOGY
Payer: COMMERCIAL

## 2018-05-21 ENCOUNTER — HOSPITAL ENCOUNTER (OUTPATIENT)
Facility: CLINIC | Age: 73
Discharge: HOME | End: 2018-05-21
Attending: RADIOLOGY | Admitting: RADIOLOGY
Payer: COMMERCIAL

## 2018-05-21 VITALS — SYSTOLIC BLOOD PRESSURE: 136 MMHG | RESPIRATION RATE: 16 BRPM | DIASTOLIC BLOOD PRESSURE: 77 MMHG | HEART RATE: 82 BPM

## 2018-05-21 DIAGNOSIS — E04.1 THYROID NODULE: ICD-10-CM

## 2018-05-21 PROCEDURE — 40000863 ZZH STATISTIC RADIOLOGY XRAY, US, CT, MAR, NM

## 2018-05-21 PROCEDURE — 88173 CYTOPATH EVAL FNA REPORT: CPT | Mod: 26 | Performed by: PHYSICIAN ASSISTANT

## 2018-05-21 PROCEDURE — 10022 US BIOPSY THYROID FINE NEEDLE ASPIRATION: CPT

## 2018-05-21 PROCEDURE — 88173 CYTOPATH EVAL FNA REPORT: CPT | Mod: 91 | Performed by: PHYSICIAN ASSISTANT

## 2018-05-21 NOTE — DISCHARGE INSTRUCTIONS
Thyroid/Lymph Node Biopsy Discharge Instructions     After you go home:      You may resume your normal diet    Care of Puncture Site:      You may have mild bruising, soreness & swelling at the puncture site. This will go away in a few days    For swelling & bruising, you may use an ice pack on the site.     Activity:      You may go back to your normal activity    Avoid strenuous activity for 24 hours    Medicines:      You may resume all medications    For minor pain, you may take Acetaminophen (Tylenol) or Ibuprofen (Advil)            Call the provider who ordered this test if:      Increased redness or swelling at the site    Fluid or blood oozing from the site    Severe pain at the site    Chills or a fever greater than 101 F (38 C).    Any questions or concerns    Call  911 or go to the Emergency Room if:      Trouble breathing    Your neck swells    Bleeding that you cannot control    Severe difficulty swallowing    If you have questions call:      [facility] @ [phone]

## 2018-05-21 NOTE — IP AVS SNAPSHOT
[place] [address]    [place] [address]    Phone:  [phone]                                       After Visit Summary   5/21/2018    Bonny Raymundo    MRN: [id number]           After Visit Summary Signature Page     I have received my discharge instructions, and my questions have been answered. I have discussed any challenges I see with this plan with the nurse or doctor.    ..........................................................................................................................................  Patient/Patient Representative Signature      ..........................................................................................................................................  Patient Representative Print Name and Relationship to Patient    ..................................................               ................................................  Date                                            Time    ..........................................................................................................................................  Reviewed by Signature/Title    ...................................................              ..............................................  Date                                                            Time

## 2018-05-21 NOTE — PROGRESS NOTES
Pt assessed for thyroid fna. Denies c/o. Procedure explained and discharge instructions given. States understanding.    1030 Post assessment. VSS . Neck fna site D/I. No bleeding/swelling. Discharged per self

## 2018-05-21 NOTE — PROGRESS NOTES
RADIOLOGY PROCEDURE NOTE  Patient name: Bonny Raymundo  MRN: [id number]  : 1945    Pre-procedure diagnosis: Thyroid nodule  Post-procedure diagnosis: Same    Procedure Date/Time: May 21, 2018  10:39 AM  Procedure: Left thyroid nodule biopsy  Estimated blood loss: None  Specimen(s) collected with description: 3 passes with aspirate onto slides  The patient tolerated the procedure well with no immediate complications.  Significant findings:none    See imaging dictation for procedural details.    Provider name: Lucas Decker  Assistant(s):None

## 2018-05-21 NOTE — IP AVS SNAPSHOT
MRN:[id number]                      After Visit Summary   5/21/2018    Bonny Raymundo    MRN: [id number]           Visit Information        Department      5/21/2018  9:17 AM [facility]          Review of your medicines      UNREVIEWED medicines. Ask your doctor about these medicines        Dose / Directions    BENADRYL ALLERGY 25 MG tablet   Generic drug:  diphenhydrAMINE        Dose:  25 mg   Take 25 mg by mouth At Bedtime   Quantity:  56 tablet   Refills:  0       Calcium + D3 600-200 MG-UNIT Tabs        Dose:  1 tablet   Take 1 tablet by mouth 2 times daily   Quantity:  60 tablet   Refills:  0       chlorpheniramine 4 MG tablet   Commonly known as:  CHLOR-TRIMETON        Dose:  4 mg   Take 4 mg by mouth every 6 hours as needed. For head cold   Refills:  0       cycloSPORINE modified 25 MG capsule   Commonly known as:  GENERIC EQUIVALENT   Used for:  Aplastic anemia (H)        Dose:  100 mg   Take 4 capsules (100 mg) by mouth 2 times daily or as directed   Quantity:  540 capsule   Refills:  6       eltrombopag 50 MG tablet   Commonly known as:  PROMACTA   Used for:  Idiopathic aplastic anemia (H), Pancytopenia (H)        Dose:  150 mg   Take 3 tablets (150 mg) by mouth daily Administer on an empty stomach, 1 hour before or 2 hours after a meal. Or as directed   Quantity:  90 tablet   Refills:  6       loratadine 10 MG tablet   Commonly known as:  CLARITIN        Dose:  10 mg   Take 10 mg by mouth daily as needed Reported on 4/26/2017   Refills:  0       pantoprazole 40 MG EC tablet   Commonly known as:  PROTONIX   Used for:  Gastroesophageal reflux disease without esophagitis        Dose:  40 mg   Take 1 tablet (40 mg) by mouth daily   Quantity:  30 tablet   Refills:  3       TYLENOL PO        Dose:  325 mg   Take 325 mg by mouth every 6 hours as needed for mild pain or fever   Refills:  0         CONTINUE these medicines which have NOT CHANGED        Dose / Directions     COMPRESSION STOCKINGS   Used for:  DVT (deep venous thrombosis), left, Postphlebitic syndrome        Wear compression stockings at 20-30 mmHg rating most time during the day to the affected leg (left leg) or both legs. Take them off at night.   Quantity:  2 each   Refills:  2       order for DME   Used for:  DVT prophylaxis        Equipment being ordered: knee high compression stockings- 18-20 mm   Quantity:  1 Box   Refills:  1                Protect others around you: Learn how to safely use, store and throw away your medicines at www.disposemymeds.org.         Follow-ups after your visit        Your next 10 appointments already scheduled     May 21, 2018  9:30 AM CDT   US BIOPSY THYROID FINE NEEDLE ASPIRATION with SHUS4, SH BODY RAD   [facility] Ultrasound ([facility])    [address] [facility] [address] [phone]           Bring a list of your medicines to the exam. Include vitamins, minerals and over-the-counter drugs.  Tell your doctor in advance:   If you are or may be pregnant.   If you are taking Coumadin (or any other blood thinners) 5 days prior to the exam for any special instructions.  IF YOUR DOCTOR HAS TOLD YOU THAT YOU WILL BE RECEIVING SEDATION (medicine to help you relax): (Typically sedation is only for liver exams at [facility] and Renal Biopsy exams in Pediatrics)   See your family doctor for an exam within 30 days of treatment.   Plan for an adult to drive you home and stay with you for at least 24 hours.   No eating or drinking for 4 hours before your test. You may take medicine with small sips of water.   If you have diabetes:If you take insulin, call your diabetes care team for any special instructions for this exam.  Please call the Imaging Department at your exam site with any questions.             May 24, 2018   Procedure with Meliton Castrejon MD   [facility] Endoscopy ([facility])    [address] [place] [address]  [place] [address]   [phone]           [facility] is located at [address] [place], [place] [address]j luis [place]            Jun 28, 2018  3:00 PM CDT   Masonic Lab Draw with  MASONIC LAB DRAW   [facility] Masonic Lab Draw ([facility])    [address] [place] [address]  [address]  [facility] [address]   [phone]            Jun 28, 2018  3:30 PM CDT   RETURN ONC with Rafita Rogel MD   [facility] Blood and Marrow Transplant ([facility])    [address] [place] [address]  [address]  [facility] [address]   [phone]            Aug 08, 2018 11:00 AM CDT   Masonic Lab Draw with  MASONIC LAB DRAW   [facility] Masonic Lab Draw ([facility])    [address] [place] [address]  [address]  [facility] [address]   [phone]            Aug 08, 2018 11:40 AM CDT   (Arrive by 11:25 AM)   Return Visit with Celine Walls PA-C   [facility] ([facility])    [address] [place] [address]  [address]  [facility] [address]   [phone]               Care Instructions        Further instructions from your care team       Thyroid/Lymph Node Biopsy Discharge Instructions     After you go home:      You may resume your normal diet    Care of Puncture Site:      You may have mild bruising, soreness & swelling at the puncture site. This will go away in a few days    For swelling & bruising, you may use an ice pack on the site.     Activity:      You may go back to your normal activity    Avoid strenuous activity for 24 hours    Medicines:      You may resume all medications    For minor pain, you may take Acetaminophen (Tylenol) or Ibuprofen (Advil)            Call the provider who ordered this test if:      Increased redness or swelling at the site    Fluid or blood oozing from the site    Severe pain at the site    Chills or a fever greater than 101 F (38 C).    Any questions or concerns    Call  911 or go to the  "Emergency Room if:      Trouble breathing    Your neck swells    Bleeding that you cannot control    Severe difficulty swallowing    If you have questions call:      [facility] Radiology Dept @ [phone]         Additional Information About Your Visit        MyChart Information     [employer] lets you send messages to your doctor, view your test results, renew your prescriptions, schedule appointments and more. To sign up, go to [email or web address] . Click on \"Log in\" on the left side of the screen, which will take you to the Welcome page. Then click on \"Sign up Now\" on the right side of the page.     You will be asked to enter the access code listed below, as well as some personal information. Please follow the directions to create your username and password.     Your access code is: [email or web address]  Expires: 2018  4:28 PM     Your access code will  in 90 days. If you need help or a new code, please call your [facility] clinic or [phone].        Care EveryWhere ID     This is your Care EveryWhere ID. This could be used by other organizations to access your [facility] medical records  [id number]         Primary Care Provider Office Phone # Fax #    Shivani Phelps PA-C [phone]      Equal Access to Services     [facility] GAURAV AH: Hadii erica ahumada hadasho Soomaali, waaxda luqadaha, qaybta kaalmada adeegyada, ambika strong . So [facility] [phone].    ATENCIÓN: Si habla español, tiene a robertson disposición servicios gratuitos de asistencia lingüística. Cayden al [phone].    We comply with applicable federal civil rights laws and [place] laws. We do not discriminate on the basis of race, color, national origin, age, disability, sex, sexual orientation, or gender identity.            Thank you!     Thank you for choosing [facility] for your care. Our goal is always to provide you with excellent care. Hearing back from our patients is one way we can continue to " improve our services. Please take a few minutes to complete the written survey that you may receive in the mail after you visit with us. Thank you!             Medication List: This is a list of all your medications and when to take them. Check marks below indicate your daily home schedule. Keep this list as a reference.      Medications           Morning Afternoon Evening Bedtime As Needed    BENADRYL ALLERGY 25 MG tablet   Take 25 mg by mouth At Bedtime   Generic drug:  diphenhydrAMINE                                Calcium + D3 600-200 MG-UNIT Tabs   Take 1 tablet by mouth 2 times daily                                chlorpheniramine 4 MG tablet   Commonly known as:  CHLOR-TRIMETON   Take 4 mg by mouth every 6 hours as needed. For head cold                                COMPRESSION STOCKINGS   Wear compression stockings at 20-30 mmHg rating most time during the day to the affected leg (left leg) or both legs. Take them off at night.                                cycloSPORINE modified 25 MG capsule   Commonly known as:  GENERIC EQUIVALENT   Take 4 capsules (100 mg) by mouth 2 times daily or as directed                                eltrombopag 50 MG tablet   Commonly known as:  PROMACTA   Take 3 tablets (150 mg) by mouth daily Administer on an empty stomach, 1 hour before or 2 hours after a meal. Or as directed                                loratadine 10 MG tablet   Commonly known as:  CLARITIN   Take 10 mg by mouth daily as needed Reported on 4/26/2017                                order for DME   Equipment being ordered: knee high compression stockings- 18-20 mm                                pantoprazole 40 MG EC tablet   Commonly known as:  PROTONIX   Take 1 tablet (40 mg) by mouth daily                                TYLENOL PO   Take 325 mg by mouth every 6 hours as needed for mild pain or fever

## 2018-05-22 LAB — COPATH REPORT: NORMAL

## 2018-05-24 ENCOUNTER — HOSPITAL ENCOUNTER (OUTPATIENT)
Facility: CLINIC | Age: 73
Discharge: HOME | End: 2018-05-24
Attending: INTERNAL MEDICINE | Admitting: INTERNAL MEDICINE
Payer: COMMERCIAL

## 2018-05-24 ENCOUNTER — SURGERY (OUTPATIENT)
Age: 73
End: 2018-05-24

## 2018-05-24 VITALS
RESPIRATION RATE: 24 BRPM | OXYGEN SATURATION: 97 % | DIASTOLIC BLOOD PRESSURE: 84 MMHG | SYSTOLIC BLOOD PRESSURE: 140 MMHG

## 2018-05-24 DIAGNOSIS — E04.1 THYROID NODULE: Primary | ICD-10-CM

## 2018-05-24 LAB — COLONOSCOPY: NORMAL

## 2018-05-24 PROCEDURE — G0121 COLON CA SCRN NOT HI RSK IND: HCPCS | Performed by: INTERNAL MEDICINE

## 2018-05-24 PROCEDURE — 45378 DIAGNOSTIC COLONOSCOPY: CPT | Performed by: INTERNAL MEDICINE

## 2018-05-24 PROCEDURE — 99153 MOD SED SAME PHYS/QHP EA: CPT | Performed by: INTERNAL MEDICINE

## 2018-05-24 PROCEDURE — 25000128 H RX IP 250 OP 636: Performed by: INTERNAL MEDICINE

## 2018-05-24 RX ORDER — ONDANSETRON 2 MG/ML
4 INJECTION INTRAMUSCULAR; INTRAVENOUS
Status: DISCONTINUED | OUTPATIENT
Start: 2018-05-24 | End: 2018-05-24 | Stop reason: HOSPADM

## 2018-05-24 RX ORDER — FENTANYL CITRATE 50 UG/ML
INJECTION, SOLUTION INTRAMUSCULAR; INTRAVENOUS PRN
Status: DISCONTINUED | OUTPATIENT
Start: 2018-05-24 | End: 2018-05-24 | Stop reason: HOSPADM

## 2018-05-24 RX ORDER — LIDOCAINE 40 MG/G
CREAM TOPICAL
Status: DISCONTINUED | OUTPATIENT
Start: 2018-05-24 | End: 2018-05-24 | Stop reason: HOSPADM

## 2018-05-24 RX ADMIN — MIDAZOLAM 2 MG: 1 INJECTION INTRAMUSCULAR; INTRAVENOUS at 10:53

## 2018-05-24 RX ADMIN — FENTANYL CITRATE 100 MCG: 50 INJECTION, SOLUTION INTRAMUSCULAR; INTRAVENOUS at 10:53

## 2018-05-24 NOTE — ADDENDUM NOTE
Encounter addended by: Shivani Phelps PA-C on: 5/24/2018  1:14 PM<BR>     Actions taken: Results reviewed in IB

## 2018-05-31 ENCOUNTER — TELEPHONE (OUTPATIENT)
Dept: INTERVENTIONAL RADIOLOGY/VASCULAR | Facility: CLINIC | Age: 73
End: 2018-05-31

## 2018-05-31 NOTE — TELEPHONE ENCOUNTER
Interventional Radiology   Radiology at [facility] has been requested to perform a re do Left thyroid nodule FNA from Shivani RAYA on 5/30/18.  Bonny Raymundo is a 72 year old woman with a history of Aplastic anemia, DVT and PE (2012), osteoporosis and a left thyroid nodule which was noted on imaging last year. A thyroid ultrasound was done which showed a 1.8 X 1.9 cm thyroid nodule. Bonny had the FNA done on 5/21/18 and the results were non diagnostic. Another Left Thyroid nodule FNA has been ordered.  Central scheduling will contact the patient to schedule the biopsy.      Thanks Arlette [facility] Interventional Radiology CNP ([phone])

## 2018-06-05 ENCOUNTER — HOSPITAL ENCOUNTER (OUTPATIENT)
Dept: ULTRASOUND IMAGING | Facility: CLINIC | Age: 73
End: 2018-06-05
Attending: PHYSICIAN ASSISTANT | Admitting: RADIOLOGY
Payer: COMMERCIAL

## 2018-06-05 ENCOUNTER — HOSPITAL ENCOUNTER (OUTPATIENT)
Facility: CLINIC | Age: 73
Discharge: HOME | End: 2018-06-05
Attending: RADIOLOGY | Admitting: RADIOLOGY
Payer: COMMERCIAL

## 2018-06-05 VITALS
RESPIRATION RATE: 18 BRPM | OXYGEN SATURATION: 96 % | SYSTOLIC BLOOD PRESSURE: 150 MMHG | DIASTOLIC BLOOD PRESSURE: 81 MMHG | TEMPERATURE: 98.9 F

## 2018-06-05 DIAGNOSIS — E04.1 THYROID NODULE: ICD-10-CM

## 2018-06-05 PROCEDURE — 25000125 ZZHC RX 250: Performed by: RADIOLOGY

## 2018-06-05 PROCEDURE — 27211108 US BIOPSY THYROID FINE NEEDLE ASPIRATION

## 2018-06-05 PROCEDURE — 88173 CYTOPATH EVAL FNA REPORT: CPT | Mod: 26 | Performed by: RADIOLOGY

## 2018-06-05 PROCEDURE — 40000863 ZZH STATISTIC RADIOLOGY XRAY, US, CT, MAR, NM

## 2018-06-05 PROCEDURE — 88173 CYTOPATH EVAL FNA REPORT: CPT | Performed by: RADIOLOGY

## 2018-06-05 RX ORDER — LIDOCAINE HYDROCHLORIDE 10 MG/ML
2 INJECTION, SOLUTION EPIDURAL; INFILTRATION; INTRACAUDAL; PERINEURAL ONCE
Status: COMPLETED | OUTPATIENT
Start: 2018-06-05 | End: 2018-06-05

## 2018-06-05 RX ORDER — LIDOCAINE 40 MG/G
CREAM TOPICAL
Status: DISCONTINUED | OUTPATIENT
Start: 2018-06-05 | End: 2018-06-05 | Stop reason: HOSPADM

## 2018-06-05 RX ADMIN — LIDOCAINE HYDROCHLORIDE 2 ML: 10 INJECTION, SOLUTION INFILTRATION; PERINEURAL at 15:02

## 2018-06-05 NOTE — IP AVS SNAPSHOT
[address] [place] [address]    Phone:  [phone]                                       After Visit Summary   6/5/2018    Bonny Raymundo    MRN: [id number]           After Visit Summary Signature Page     I have received my discharge instructions, and my questions have been answered. I have discussed any challenges I see with this plan with the nurse or doctor.    ..........................................................................................................................................  Patient/Patient Representative Signature      ..........................................................................................................................................  Patient Representative Print Name and Relationship to Patient    ..................................................               ................................................  Date                                            Time    ..........................................................................................................................................  Reviewed by Signature/Title    ...................................................              ..............................................  Date                                                            Time

## 2018-06-05 NOTE — DISCHARGE INSTRUCTIONS
Thyroid Biopsy Discharge Instructions     After you go home:      You may resume your normal diet    Care of Puncture Site:      You may have mild bruising, soreness & swelling at the puncture site. This will go away in a few days    For swelling & bruising, you may use an ice pack on the site.     Activity:      You may go back to your normal activity    Avoid strenuous activity for 24 hours    Medicines:      You may resume all medications    For minor pain, you may take Acetaminophen (Tylenol) or Ibuprofen (Advil)            Call the provider who ordered this test if:      Increased redness or swelling at the site    Fluid or blood oozing from the site    Severe pain at the site    Chills or a fever greater than 101 F (38 C).    Any questions or concerns    Call  911 or go to the Emergency Room if:      Trouble breathing    Your neck swells    Bleeding that you cannot control    Severe difficulty swallowing    If you have questions call:      [facility] Radiology Dept @ [phone]    The provider who performed your procedure was _________________.

## 2018-06-05 NOTE — IP AVS SNAPSHOT
MRN:[id number]                      After Visit Summary   6/5/2018    Bonny Raymundo    MRN: [id number]           Visit Information        Department      6/5/2018  1:56 PM [facility]          Review of your medicines      UNREVIEWED medicines. Ask your doctor about these medicines        Dose / Directions    BENADRYL ALLERGY 25 MG tablet   Generic drug:  diphenhydrAMINE        Dose:  25 mg   Take 25 mg by mouth At Bedtime   Quantity:  56 tablet   Refills:  0       Calcium + D3 600-200 MG-UNIT Tabs        Dose:  1 tablet   Take 1 tablet by mouth 2 times daily   Quantity:  60 tablet   Refills:  0       chlorpheniramine 4 MG tablet   Commonly known as:  CHLOR-TRIMETON        Dose:  4 mg   Take 4 mg by mouth every 6 hours as needed. For head cold   Refills:  0       cycloSPORINE modified 25 MG capsule   Commonly known as:  GENERIC EQUIVALENT   Used for:  Aplastic anemia (H)        Dose:  100 mg   Take 4 capsules (100 mg) by mouth 2 times daily or as directed   Quantity:  540 capsule   Refills:  6       eltrombopag 50 MG tablet   Commonly known as:  PROMACTA   Used for:  Idiopathic aplastic anemia (H), Pancytopenia (H)        Dose:  150 mg   Take 3 tablets (150 mg) by mouth daily Administer on an empty stomach, 1 hour before or 2 hours after a meal. Or as directed   Quantity:  90 tablet   Refills:  6       loratadine 10 MG tablet   Commonly known as:  CLARITIN        Dose:  10 mg   Take 10 mg by mouth daily as needed Reported on 4/26/2017   Refills:  0       pantoprazole 40 MG EC tablet   Commonly known as:  PROTONIX   Used for:  Gastroesophageal reflux disease without esophagitis        Dose:  40 mg   Take 1 tablet (40 mg) by mouth daily   Quantity:  30 tablet   Refills:  3       TYLENOL PO        Dose:  325 mg   Take 325 mg by mouth every 6 hours as needed for mild pain or fever   Refills:  0         CONTINUE these medicines which have NOT CHANGED        Dose / Directions     COMPRESSION STOCKINGS   Used for:  DVT (deep venous thrombosis), left, Postphlebitic syndrome        Wear compression stockings at 20-30 mmHg rating most time during the day to the affected leg (left leg) or both legs. Take them off at night.   Quantity:  2 each   Refills:  2       order for DME   Used for:  DVT prophylaxis        Equipment being ordered: knee high compression stockings- 18-20 mm   Quantity:  1 Box   Refills:  1                Protect others around you: Learn how to safely use, store and throw away your medicines at www.disposemymeds.org.         Follow-ups after your visit        Your next 10 appointments already scheduled     Jun 28, 2018  3:00 PM CDT   Masonic Lab Draw with  MASONIC LAB DRAW   [facility] Masonic Lab Draw ([facility])    [address] [place] [address]  [address] [place] [address]   [phone]            Jun 28, 2018  3:30 PM CDT   RETURN ONC with Rafita Rogel MD   [facility] Blood and Marrow Transplant ([facility])    [address] [place] [address]  [address] [place] [address]   [phone]            Aug 08, 2018 11:00 AM CDT   Masonic Lab Draw with  MASONIC LAB DRAW   [facility] Masonic Lab Draw ([facility])    [address] [place] [address]  [address] [place] [address]   [phone]            Aug 08, 2018 11:40 AM CDT   (Arrive by 11:25 AM)   Return Visit with Celine Walls PA-C   [facility] ([facility])    [address] [place] [address]  [address] [place] [address]   [phone]               Care Instructions        Further instructions from your care team       Thyroid Biopsy Discharge Instructions     After you go home:      You may resume your normal diet    Care of Puncture Site:      You may have mild bruising, soreness & swelling at the puncture site. This will go away in a few days    For swelling & bruising, you may use an ice pack on  "the site.     Activity:      You may go back to your normal activity    Avoid strenuous activity for 24 hours    Medicines:      You may resume all medications    For minor pain, you may take Acetaminophen (Tylenol) or Ibuprofen (Advil)            Call the provider who ordered this test if:      Increased redness or swelling at the site    Fluid or blood oozing from the site    Severe pain at the site    Chills or a fever greater than 101 F (38 C).    Any questions or concerns    Call  911 or go to the Emergency Room if:      Trouble breathing    Your neck swells    Bleeding that you cannot control    Severe difficulty swallowing    If you have questions call:      [facility] Radiology Dept @ [phone]    The provider who performed your procedure was _________________.     Additional Information About Your Visit        MyChart Information     [facility] lets you send messages to your doctor, view your test results, renew your prescriptions, schedule appointments and more. To sign up, go to [email or web address] . Click on \"Log in\" on the left side of the screen, which will take you to the Welcome page. Then click on \"Sign up Now\" on the right side of the page.     You will be asked to enter the access code listed below, as well as some personal information. Please follow the directions to create your username and password.     Your access code is: [email or web address]  Expires: 2018  4:28 PM     Your access code will  in 90 days. If you need help or a new code, please call your [facility] clinic or [phone].        Care EveryWhere ID     This is your Care EveryWhere ID. This could be used by other organizations to access your [facility] medical records  [id number]        Your Vitals Were     Blood Pressure Temperature Respirations Pulse Oximetry          134/80 98.9  F (37.2  C) 18 96%         Primary Care Provider Office Phone # Fax #    Shivani Phelps PA-C [phone]      Equal " Access to Services     [facility]: Hadii erica ahumada jeanna Hines, wamarioda luqadaha, qaybta kalubalandy cortezjuanaambika billshavoneric levine. So [facility] [phone].    ATENCIÓN: Si habla español, tiene a robertson disposición servicios gratuitos de asistencia lingüística. Llame al [phone].    We comply with applicable federal civil rights laws and [place] laws. We do not discriminate on the basis of race, color, national origin, age, disability, sex, sexual orientation, or gender identity.            Thank you!     Thank you for choosing [facility] for your care. Our goal is always to provide you with excellent care. Hearing back from our patients is one way we can continue to improve our services. Please take a few minutes to complete the written survey that you may receive in the mail after you visit with us. Thank you!             Medication List: This is a list of all your medications and when to take them. Check marks below indicate your daily home schedule. Keep this list as a reference.      Medications           Morning Afternoon Evening Bedtime As Needed    BENADRYL ALLERGY 25 MG tablet   Take 25 mg by mouth At Bedtime   Generic drug:  diphenhydrAMINE                                Calcium + D3 600-200 MG-UNIT Tabs   Take 1 tablet by mouth 2 times daily                                chlorpheniramine 4 MG tablet   Commonly known as:  CHLOR-TRIMETON   Take 4 mg by mouth every 6 hours as needed. For head cold                                COMPRESSION STOCKINGS   Wear compression stockings at 20-30 mmHg rating most time during the day to the affected leg (left leg) or both legs. Take them off at night.                                cycloSPORINE modified 25 MG capsule   Commonly known as:  GENERIC EQUIVALENT   Take 4 capsules (100 mg) by mouth 2 times daily or as directed                                eltrombopag 50 MG tablet   Commonly known as:  PROMACTA   Take 3 tablets (150 mg) by mouth daily  Administer on an empty stomach, 1 hour before or 2 hours after a meal. Or as directed                                loratadine 10 MG tablet   Commonly known as:  CLARITIN   Take 10 mg by mouth daily as needed Reported on 4/26/2017                                order for DME   Equipment being ordered: knee high compression stockings- 18-20 mm                                pantoprazole 40 MG EC tablet   Commonly known as:  PROTONIX   Take 1 tablet (40 mg) by mouth daily                                TYLENOL PO   Take 325 mg by mouth every 6 hours as needed for mild pain or fever

## 2018-06-05 NOTE — PROGRESS NOTES
Care Suites Arrival    Reason for Visit: FNA Thyroid Biopsy     Patient is alert and oriented. Denies pain. D/C instructions reviewed with pt with verbal understanding received. Copy given to pt.      Post Procedure:    Midline neck thyroid biopsy site CDI. No reddness or swelling noted. Pt denies discomfort, resp distress or difficulty swallowing. VSS. Pt discharged per self. All belongings taken with pt.

## 2018-06-07 LAB — COPATH REPORT: NORMAL

## 2018-06-21 ENCOUNTER — TRANSFERRED RECORDS (OUTPATIENT)
Dept: HEALTH INFORMATION MANAGEMENT | Facility: CLINIC | Age: 73
End: 2018-06-21

## 2018-06-28 ENCOUNTER — OFFICE VISIT (OUTPATIENT)
Dept: TRANSPLANT | Facility: CLINIC | Age: 73
End: 2018-06-28
Attending: INTERNAL MEDICINE
Payer: COMMERCIAL

## 2018-06-28 ENCOUNTER — APPOINTMENT (OUTPATIENT)
Dept: LAB | Facility: CLINIC | Age: 73
End: 2018-06-28
Attending: INTERNAL MEDICINE
Payer: COMMERCIAL

## 2018-06-28 VITALS
HEART RATE: 82 BPM | OXYGEN SATURATION: 95 % | TEMPERATURE: 98.5 F | DIASTOLIC BLOOD PRESSURE: 79 MMHG | HEIGHT: 61 IN | WEIGHT: 115.2 LBS | SYSTOLIC BLOOD PRESSURE: 134 MMHG | BODY MASS INDEX: 21.75 KG/M2 | RESPIRATION RATE: 16 BRPM

## 2018-06-28 DIAGNOSIS — D61.3 IDIOPATHIC APLASTIC ANEMIA (H): ICD-10-CM

## 2018-06-28 DIAGNOSIS — D61.9 APLASTIC ANEMIA (H): ICD-10-CM

## 2018-06-28 DIAGNOSIS — D61.818 PANCYTOPENIA (H): ICD-10-CM

## 2018-06-28 LAB
ALBUMIN SERPL-MCNC: 3.6 G/DL (ref 3.4–5)
ALP SERPL-CCNC: 76 U/L (ref 40–150)
ALT SERPL W P-5'-P-CCNC: 17 U/L (ref 0–50)
ANION GAP SERPL CALCULATED.3IONS-SCNC: 8 MMOL/L (ref 3–14)
AST SERPL W P-5'-P-CCNC: 16 U/L (ref 0–45)
BASOPHILS # BLD AUTO: 0 10E9/L (ref 0–0.2)
BASOPHILS NFR BLD AUTO: 0.3 %
BILIRUB SERPL-MCNC: 0.8 MG/DL (ref 0.2–1.3)
BUN SERPL-MCNC: 51 MG/DL (ref 7–30)
CALCIUM SERPL-MCNC: 8.7 MG/DL (ref 8.5–10.1)
CHLORIDE SERPL-SCNC: 112 MMOL/L (ref 94–109)
CO2 SERPL-SCNC: 21 MMOL/L (ref 20–32)
CREAT SERPL-MCNC: 1.66 MG/DL (ref 0.52–1.04)
CYCLOSPORINE BLD LC/MS/MS-MCNC: 94 UG/L (ref 50–400)
DIFFERENTIAL METHOD BLD: ABNORMAL
EOSINOPHIL # BLD AUTO: 0.2 10E9/L (ref 0–0.7)
EOSINOPHIL NFR BLD AUTO: 5.3 %
ERYTHROCYTE [DISTWIDTH] IN BLOOD BY AUTOMATED COUNT: 15.5 % (ref 10–15)
GFR SERPL CREATININE-BSD FRML MDRD: 30 ML/MIN/1.7M2
GLUCOSE SERPL-MCNC: 99 MG/DL (ref 70–99)
HCT VFR BLD AUTO: 25.9 % (ref 35–47)
HGB BLD-MCNC: 8.6 G/DL (ref 11.7–15.7)
IMM GRANULOCYTES # BLD: 0 10E9/L (ref 0–0.4)
IMM GRANULOCYTES NFR BLD: 0.3 %
LYMPHOCYTES # BLD AUTO: 1.5 10E9/L (ref 0.8–5.3)
LYMPHOCYTES NFR BLD AUTO: 40.7 %
MCH RBC QN AUTO: 40 PG (ref 26.5–33)
MCHC RBC AUTO-ENTMCNC: 33.2 G/DL (ref 31.5–36.5)
MCV RBC AUTO: 121 FL (ref 78–100)
MONOCYTES # BLD AUTO: 0.3 10E9/L (ref 0–1.3)
MONOCYTES NFR BLD AUTO: 7.7 %
NEUTROPHILS # BLD AUTO: 1.7 10E9/L (ref 1.6–8.3)
NEUTROPHILS NFR BLD AUTO: 45.7 %
NRBC # BLD AUTO: 0 10*3/UL
NRBC BLD AUTO-RTO: 1 /100
PLATELET # BLD AUTO: 62 10E9/L (ref 150–450)
POTASSIUM SERPL-SCNC: 4.4 MMOL/L (ref 3.4–5.3)
PROT SERPL-MCNC: 7.5 G/DL (ref 6.8–8.8)
RBC # BLD AUTO: 2.15 10E12/L (ref 3.8–5.2)
SODIUM SERPL-SCNC: 142 MMOL/L (ref 133–144)
TME LAST DOSE: NORMAL H
WBC # BLD AUTO: 3.8 10E9/L (ref 4–11)

## 2018-06-28 PROCEDURE — G0463 HOSPITAL OUTPT CLINIC VISIT: HCPCS | Mod: ZF

## 2018-06-28 PROCEDURE — 85025 COMPLETE CBC W/AUTO DIFF WBC: CPT | Performed by: INTERNAL MEDICINE

## 2018-06-28 PROCEDURE — 80053 COMPREHEN METABOLIC PANEL: CPT | Performed by: INTERNAL MEDICINE

## 2018-06-28 PROCEDURE — 80158 DRUG ASSAY CYCLOSPORINE: CPT | Performed by: INTERNAL MEDICINE

## 2018-06-28 PROCEDURE — 36415 COLL VENOUS BLD VENIPUNCTURE: CPT

## 2018-06-28 RX ORDER — CYCLOSPORINE 25 MG/1
75 CAPSULE, LIQUID FILLED ORAL 2 TIMES DAILY
Qty: 540 CAPSULE | Refills: 6 | COMMUNITY
Start: 2018-06-28 | End: 2018-08-08

## 2018-06-28 NOTE — NURSING NOTE
Chief Complaint   Patient presents with     Blood Draw     vs and labs     Vitals done and labs drawn vpt in right AC. See flowsheets. Pt took last dose of cyclosporine at 10:30 pm 6/27/18.    Danielle ArellanoCMA

## 2018-06-28 NOTE — LETTER
"6/28/2018      RE: Bonny Raymundo  [address]  [facility] [address]       /79  Pulse 82  Temp 98.5  F (36.9  C) (Oral)  Resp 16  Ht 1.537 m (5' 0.51\")  Wt 52.3 kg (115 lb 3.2 oz)  SpO2 95%  BMI 22.12 kg/m2  Wt Readings from Last 4 Encounters:   06/28/18 52.3 kg (115 lb 3.2 oz)   05/15/18 52.9 kg (116 lb 11.2 oz)   04/30/18 51.7 kg (114 lb)   04/05/18 51.7 kg (113 lb 14.4 oz)     Suze returns to follow-up for severe aplastic anemia. She's had a good response to immunosuppressive therapy and continues on cyclosporine currently 100 mg twice daily and eltrombopeg.  She's not needed transfusions in some months and she's had no notable recent infections. She inquired about a shingles vaccine and it was late today so we didn't do it, but I told her it is safe if she has a recombinant shingrix vaccine, though I can't validate its efficacy when given during her ongoing suppressive therapy. It is acceptable to administer it and we will do so next month.    She's had no recent fever, chills, rash, bleeding, bruising or infection. She has no new GI or  symptomatology. She has no new ENT, cardiac or respiratory symptoms. She 's had some slight runny nose which she thinks is allergic like;as its associated with itchy eyes.    Her physical exam shows her weight is stable and her vital signs are acceptable. She has no notable bruises or petechiae. She has no cervical, supraclavicular or axillary lymphadenopathy. Her abdomen is soft without palpable hepatosplenomegaly or masses. Her oropharynx is clear. Her lungs are clear without rales or wheezes. Her heart tones are regular. There is a soft ejection murmur at the upper sternal border but no gallop. She has no lower extremity edema or skin rash.    Her blood counts are acceptable, though she still has chronic anemia but it's been unchanged.Her platelet count is the highest It's Been in Several Years and 62,000, but Her Renal Function Is a Bit Worse " Today with a Creatinine up to 1.66.   I Advised Her to Reduce Her cyclosporine to 75 Mg Twice Daily.    We Will Recheck Her Creatinine in Roughly 2 Weeks and Adjust Further. We Are Not Targeting any particular Trough Cyclosporine Level, but We Are Continuing the Therapy at a level which does not produce Progressive Renal Insufficiency. The eltrombopag Should Continue As Well.     She Will Have the Lab Check in 2 Weeks and Be Seen Again in a Month and Shingrix Will Be Given at That Time. Her  Questions Were Answered in full, She Knows to Call If New Problems Arise.    Rfaita Rogel M.D.    Professor Medicine    Results for LISSETH PARIKH (MRN [id number]) as of 6/28/2018 21:37   Ref. Range 6/5/2018 15:18 6/6/2018 10:36 6/28/2018 15:17 6/28/2018 15:25   Sodium Latest Ref Range: 133 - 144 mmol/L   142    Potassium Latest Ref Range: 3.4 - 5.3 mmol/L   4.4    Chloride Latest Ref Range: 94 - 109 mmol/L   112 (H)    Carbon Dioxide Latest Ref Range: 20 - 32 mmol/L   21    Urea Nitrogen Latest Ref Range: 7 - 30 mg/dL   51 (H)    Creatinine Latest Ref Range: 0.52 - 1.04 mg/dL   1.66 (H)    GFR Estimate Latest Ref Range: >60 mL/min/1.7m2   30 (L)    GFR Estimate If Black Latest Ref Range: >60 mL/min/1.7m2   36 (L)    Calcium Latest Ref Range: 8.5 - 10.1 mg/dL   8.7    Anion Gap Latest Ref Range: 3 - 14 mmol/L   8    Albumin Latest Ref Range: 3.4 - 5.0 g/dL   3.6    Protein Total Latest Ref Range: 6.8 - 8.8 g/dL   7.5    Bilirubin Total Latest Ref Range: 0.2 - 1.3 mg/dL   0.8    Alkaline Phosphatase Latest Ref Range: 40 - 150 U/L   76    ALT Latest Ref Range: 0 - 50 U/L   17    AST Latest Ref Range: 0 - 45 U/L   16    Glucose Latest Ref Range: 70 - 99 mg/dL   99    WBC Latest Ref Range: 4.0 - 11.0 10e9/L   3.8 (L)    Hemoglobin Latest Ref Range: 11.7 - 15.7 g/dL   8.6 (L)    Hematocrit Latest Ref Range: 35.0 - 47.0 %   25.9 (L)    Platelet Count Latest Ref Range: 150 - 450 10e9/L   62 (L)    RBC Count Latest Ref Range: 3.8 -  5.2 10e12/L   2.15 (L)    MCV Latest Ref Range: 78 - 100 fl   121 (H)    MCH Latest Ref Range: 26.5 - 33.0 pg   40.0 (H)    MCHC Latest Ref Range: 31.5 - 36.5 g/dL   33.2    RDW Latest Ref Range: 10.0 - 15.0 %   15.5 (H)    Diff Method Unknown   Automated Method    % Neutrophils Latest Units: %   45.7    % Lymphocytes Latest Units: %   40.7    % Monocytes Latest Units: %   7.7    % Eosinophils Latest Units: %   5.3    % Basophils Latest Units: %   0.3    % Immature Granulocytes Latest Units: %   0.3    Nucleated RBCs Latest Ref Range: 0 /100   1 (H)    Absolute Neutrophil Latest Ref Range: 1.6 - 8.3 10e9/L   1.7    Absolute Lymphocytes Latest Ref Range: 0.8 - 5.3 10e9/L   1.5    Absolute Monocytes Latest Ref Range: 0.0 - 1.3 10e9/L   0.3    Absolute Eosinophils Latest Ref Range: 0.0 - 0.7 10e9/L   0.2    Absolute Basophils Latest Ref Range: 0.0 - 0.2 10e9/L   0.0    Abs Immature Granulocytes Latest Ref Range: 0 - 0.4 10e9/L   0.0    Absolute Nucleated RBC Unknown   0.0    Cyclosporine Last Dose Unknown    Not Provided   Cyclosporine Level Latest Ref Range: 50 - 400 ug/L    94   FINE NEEDLE ASPIRATION Unknown  Rpt     US BIOPSY THYROID FINE NEEDLE ASPIRATION Unknown Rpt          Rafita Rogel MD

## 2018-06-28 NOTE — MR AVS SNAPSHOT
After Visit Summary   6/28/2018    Bonny Raymundo    MRN: [id number]           Patient Information     Date Of Birth          1945        Visit Information        Provider Department      6/28/2018 3:30 PM Rafita Rogel MD [facility] Blood and Marrow Transplant        Today's Diagnoses     Pancytopenia (H)        Idiopathic aplastic anemia (H)        Aplastic anemia (H)              [facility] ([facility])  [address] [place] [id number]  Phone: [phone]  Clinic Hours:   Monday-Thursday:7am to 7pm   Friday: 7am to 5pm   Weekends and holidays:    8am to noon (in general)  If your fever is 100.5  or greater,   call the clinic.  After hours call the   hospital at [phone] or   [phone]. Ask for the BMT   fellow on-call            Follow-ups after your visit        Your next 10 appointments already scheduled     Aug 08, 2018 11:00 AM CDT   [facility] with  [facility]   [facility] ([facility])    [address] [place] [id number] [phone]            Aug 08, 2018 11:40 AM CDT   (Arrive by 11:25 AM)   Return Visit with Celine Walls PA-C   [facility] ([facility])    [address] [place] [id number]   [phone]            Sep 27, 2018  2:30 PM CDT   [facility] Draw with  [facility] DRAW   [facility] ([facility])    [address] [place] [id number]   [phone]            Sep 27, 2018  3:00 PM CDT   RETURN ONC with Rafita Rogel MD   [facility] Blood and Marrow Transplant ([facility])    [address] [place] [id number] [phone]              Future tests that were ordered for you today     Open Future Orders        Priority Expected Expires Ordered    Comprehensive  "metabolic panel Routine 7/20/2018 8/28/2018 6/28/2018    CBC with platelets differential Routine 7/20/2018 8/28/2018 6/28/2018    Cyclosporine Routine 7/20/2018 8/28/2018 6/28/2018    CBC with platelets differential Routine  12/25/2018 6/28/2018    Basic metabolic panel Routine  12/25/2018 6/28/2018            Who to contact     If you have questions or need follow up information about today's clinic visit or your schedule please contact [facility] BLOOD AND MARROW TRANSPLANT directly at [phone].  Normal or non-critical lab and imaging results will be communicated to you by MyChart, letter or phone within 4 business days after the clinic has received the results. If you do not hear from us within 7 days, please contact the clinic through Mapplashart or phone. If you have a critical or abnormal lab result, we will notify you by phone as soon as possible.  Submit refill requests through [employer]only or call your pharmacy and they will forward the refill request to us. Please allow 3 business days for your refill to be completed.          Additional Information About Your Visit        Care EveryWhere ID     This is your Care EveryWhere ID. This could be used by other organizations to access your [place] medical records  [id number]        Your Vitals Were     Pulse Temperature Respirations Height Pulse Oximetry BMI (Body Mass Index)    82 98.5  F (36.9  C) (Oral) 16 1.537 m (5' 0.51\") 95% 22.12 kg/m2       Blood Pressure from Last 3 Encounters:   06/28/18 134/79   06/05/18 150/81   05/24/18 140/84    Weight from Last 3 Encounters:   06/28/18 52.3 kg (115 lb 3.2 oz)   05/15/18 52.9 kg (116 lb 11.2 oz)   04/30/18 51.7 kg (114 lb)              We Performed the Following     CBC with platelets differential     Comprehensive metabolic panel     Cyclosporine          Today's Medication Changes          These changes are accurate as of 6/28/18  9:38 PM.  If you have any questions, ask your nurse or doctor.               These " medicines have changed or have updated prescriptions.        Dose/Directions    cycloSPORINE modified 25 MG capsule   Commonly known as:  GENERIC EQUIVALENT   This may have changed:  how much to take   Used for:  Aplastic anemia (H), Pancytopenia (H), Idiopathic aplastic anemia (H)   Changed by:  Rafita Rogel MD        Dose:  75 mg   Take 3 capsules (75 mg) by mouth 2 times daily or as directed   Quantity:  540 capsule   Refills:  6                Recent Review Flowsheet Data     BMT Recent Results Latest Ref Rng & Units 1/10/2018 1/25/2018 2/15/2018 3/14/2018 4/5/2018 5/15/2018 6/28/2018    WBC 4.0 - 11.0 10e9/L 2.9(L) 3.2(L) 2.7(L) 3.5(L) 3.5(L) 3.2(L) 3.8(L)    Hemoglobin 11.7 - 15.7 g/dL 8.8(L) 9.1(L) 8.7(L) 8.6(L) 8.8(L) 8.9(L) 8.6(L)    Platelet Count 150 - 450 10e9/L 51(L) 52(L) 41(LL) 53(L) 54(L) 51(L) 62(L)    Neutrophils (Absolute) 1.6 - 8.3 10e9/L 1.5(L) 1.4(L) 1.1(L) 1.6 1.7 1.1(L) 1.7    INR 0.86 - 1.14 1.01 - - - - - -    Sodium 133 - 144 mmol/L 142 141 139 140 140 140 142    Potassium 3.4 - 5.3 mmol/L 4.0 4.1 4.6 4.4 4.5 4.4 4.4    Chloride 94 - 109 mmol/L 109 109 106 110(H) 111(H) 111(H) 112(H)    Glucose 70 - 99 mg/dL 92 69(L) 99 110(H) 107(H) 90 99    Urea Nitrogen 7 - 30 mg/dL 37(H) 39(H) 30 40(H) 35(H) 36(H) 51(H)    Creatinine 0.52 - 1.04 mg/dL 1.71(H) 1.55(H) 1.26(H) 1.23(H) 1.18(H) 1.18(H) 1.66(H)    Calcium (Total) 8.5 - 10.1 mg/dL 8.7 9.0 8.9 8.8 8.8 8.7 8.7    Protein (Total) 6.8 - 8.8 g/dL 7.7 7.6 7.2 7.1 7.4 7.4 7.5    Albumin 3.4 - 5.0 g/dL 3.5 3.6 3.5 3.5 3.4 3.8 3.6    Bilirubin (Direct) 0.0 - 0.2 mg/dL - - - - - - -    Alkaline Phosphatase 40 - 150 U/L 81 84 79 81 84 86 76    AST 0 - 45 U/L 13 13 10 12 12 15 16    ALT 0 - 50 U/L 11 18 12 13 14 14 17    MCV 78 - 100 fl 120(H) 117(H) 119(H) 119(H) 117(H) 120(H) 121(H)               Primary Care Provider Office Phone # Fax #    Shivani Phelps PA-C [phone] [address]  [place] [address]         Equal Access to Services     [facility]: Hadii erica ahumada betoleno Jose Gali, wamarioda luqjohnny, qaybta kalubaambika perry. So [facility] [phone].    ATENCIÓN: Si habla español, tiene a robertson disposición servicios gratuitos de asistencia lingüística. Llame al [phone].    We comply with applicable federal civil rights laws and [place] laws. We do not discriminate on the basis of race, color, national origin, age, disability, sex, sexual orientation, or gender identity.            Thank you!     Thank you for choosing [facility] BLOOD AND MARROW TRANSPLANT  for your care. Our goal is always to provide you with excellent care. Hearing back from our patients is one way we can continue to improve our services. Please take a few minutes to complete the written survey that you may receive in the mail after your visit with us. Thank you!             Your Updated Medication List - Protect others around you: Learn how to safely use, store and throw away your medicines at www.disposemymeds.org.          This list is accurate as of 6/28/18  9:38 PM.  Always use your most recent med list.                   Brand Name Dispense Instructions for use Diagnosis    BENADRYL ALLERGY 25 MG tablet   Generic drug:  diphenhydrAMINE     56 tablet    Take 25 mg by mouth At Bedtime        Calcium + D3 600-200 MG-UNIT Tabs     60 tablet    Take 1 tablet by mouth 2 times daily        chlorpheniramine 4 MG tablet    CHLOR-TRIMETON     Take 4 mg by mouth every 6 hours as needed. For head cold        COMPRESSION STOCKINGS     2 each    Wear compression stockings at 20-30 mmHg rating most time during the day to the affected leg (left leg) or both legs. Take them off at night.    DVT (deep venous thrombosis), left, Postphlebitic syndrome       cycloSPORINE modified 25 MG capsule    GENERIC EQUIVALENT    540 capsule    Take 3 capsules (75 mg) by mouth 2 times daily or as directed    Aplastic anemia (H),  Pancytopenia (H), Idiopathic aplastic anemia (H)       eltrombopag 50 MG tablet    PROMACTA    90 tablet    Take 3 tablets (150 mg) by mouth daily Administer on an empty stomach, 1 hour before or 2 hours after a meal. Or as directed    Idiopathic aplastic anemia (H), Pancytopenia (H)       loratadine 10 MG tablet    CLARITIN     Take 10 mg by mouth daily as needed Reported on 4/26/2017        order for DME     1 Box    Equipment being ordered: knee high compression stockings- 18-20 mm    DVT prophylaxis       pantoprazole 40 MG EC tablet    PROTONIX    30 tablet    Take 1 tablet (40 mg) by mouth daily    Gastroesophageal reflux disease without esophagitis       TYLENOL PO      Take 325 mg by mouth every 6 hours as needed for mild pain or fever

## 2018-06-28 NOTE — NURSING NOTE
"Oncology Rooming Note    June 28, 2018 3:52 PM   Bonny Raymundo is a 72 year old female who presents for:    Chief Complaint   Patient presents with     Blood Draw     vs and labs     RECHECK     Aplastic anemia      Initial Vitals: /79  Pulse 82  Temp 98.5  F (36.9  C) (Oral)  Resp 16  Ht 1.537 m (5' 0.51\")  Wt 52.3 kg (115 lb 3.2 oz)  SpO2 95%  BMI 22.12 kg/m2 Estimated body mass index is 22.12 kg/(m^2) as calculated from the following:    Height as of this encounter: 1.537 m (5' 0.51\").    Weight as of this encounter: 52.3 kg (115 lb 3.2 oz). Body surface area is 1.49 meters squared.  Data Unavailable Comment: collapsed arch - ankles   No LMP recorded. Patient has had a hysterectomy.  Allergies reviewed: Yes  Medications reviewed: Yes    Medications: Medication refills not needed today.  Pharmacy name entered into [facility]:    [facility] [place], [place] - [address]  [facility] [place], [place] - [address]  [facility], [address]  WRITTEN PRESCRIPTION REQUESTED    Clinical concerns:  No new concerns  Provider was notified.    5 minutes for nursing intake (face to face time)     Jaqui Manriquez MA              "

## 2018-06-28 NOTE — PROGRESS NOTES
"/79  Pulse 82  Temp 98.5  F (36.9  C) (Oral)  Resp 16  Ht 1.537 m (5' 0.51\")  Wt 52.3 kg (115 lb 3.2 oz)  SpO2 95%  BMI 22.12 kg/m2  Wt Readings from Last 4 Encounters:   06/28/18 52.3 kg (115 lb 3.2 oz)   05/15/18 52.9 kg (116 lb 11.2 oz)   04/30/18 51.7 kg (114 lb)   04/05/18 51.7 kg (113 lb 14.4 oz)     Suze returns to follow-up for severe aplastic anemia. She's had a good response to immunosuppressive therapy and continues on cyclosporine currently 100 mg twice daily and eltrombopeg.  She's not needed transfusions in some months and she's had no notable recent infections. She inquired about a shingles vaccine and it was late today so we didn't do it, but I told her it is safe if she has a recombinant shingrix vaccine, though I can't validate its efficacy when given during her ongoing suppressive therapy. It is acceptable to administer it and we will do so next month.    She's had no recent fever, chills, rash, bleeding, bruising or infection. She has no new GI or  symptomatology. She has no new ENT, cardiac or respiratory symptoms. She 's had some slight runny nose which she thinks is allergic like;as its associated with itchy eyes.    Her physical exam shows her weight is stable and her vital signs are acceptable. She has no notable bruises or petechiae. She has no cervical, supraclavicular or axillary lymphadenopathy. Her abdomen is soft without palpable hepatosplenomegaly or masses. Her oropharynx is clear. Her lungs are clear without rales or wheezes. Her heart tones are regular. There is a soft ejection murmur at the upper sternal border but no gallop. She has no lower extremity edema or skin rash.    Her blood counts are acceptable, though she still has chronic anemia but it's been unchanged.Her platelet count is the highest It's Been in Several Years and 62,000, but Her Renal Function Is a Bit Worse Today with a Creatinine up to 1.66.   I Advised Her to Reduce Her cyclosporine to 75 Mg " Twice Daily.    We Will Recheck Her Creatinine in Roughly 2 Weeks and Adjust Further. We Are Not Targeting any particular Trough Cyclosporine Level, but We Are Continuing the Therapy at a level which does not produce Progressive Renal Insufficiency. The eltrombopag Should Continue As Well.     She Will Have the Lab Check in 2 Weeks and Be Seen Again in a Month and Shingrix Will Be Given at That Time. Her  Questions Were Answered in full, She Knows to Call If New Problems Arise.    Rafita Rogel M.D.    Professor Medicine    Results for LISSETH PARIKH (MRN [id number]) as of 6/28/2018 21:37   Ref. Range 6/5/2018 15:18 6/6/2018 10:36 6/28/2018 15:17 6/28/2018 15:25   Sodium Latest Ref Range: 133 - 144 mmol/L   142    Potassium Latest Ref Range: 3.4 - 5.3 mmol/L   4.4    Chloride Latest Ref Range: 94 - 109 mmol/L   112 (H)    Carbon Dioxide Latest Ref Range: 20 - 32 mmol/L   21    Urea Nitrogen Latest Ref Range: 7 - 30 mg/dL   51 (H)    Creatinine Latest Ref Range: 0.52 - 1.04 mg/dL   1.66 (H)    GFR Estimate Latest Ref Range: >60 mL/min/1.7m2   30 (L)    GFR Estimate If Black Latest Ref Range: >60 mL/min/1.7m2   36 (L)    Calcium Latest Ref Range: 8.5 - 10.1 mg/dL   8.7    Anion Gap Latest Ref Range: 3 - 14 mmol/L   8    Albumin Latest Ref Range: 3.4 - 5.0 g/dL   3.6    Protein Total Latest Ref Range: 6.8 - 8.8 g/dL   7.5    Bilirubin Total Latest Ref Range: 0.2 - 1.3 mg/dL   0.8    Alkaline Phosphatase Latest Ref Range: 40 - 150 U/L   76    ALT Latest Ref Range: 0 - 50 U/L   17    AST Latest Ref Range: 0 - 45 U/L   16    Glucose Latest Ref Range: 70 - 99 mg/dL   99    WBC Latest Ref Range: 4.0 - 11.0 10e9/L   3.8 (L)    Hemoglobin Latest Ref Range: 11.7 - 15.7 g/dL   8.6 (L)    Hematocrit Latest Ref Range: 35.0 - 47.0 %   25.9 (L)    Platelet Count Latest Ref Range: 150 - 450 10e9/L   62 (L)    RBC Count Latest Ref Range: 3.8 - 5.2 10e12/L   2.15 (L)    MCV Latest Ref Range: 78 - 100 fl   121 (H)    MCH Latest Ref  Range: 26.5 - 33.0 pg   40.0 (H)    MCHC Latest Ref Range: 31.5 - 36.5 g/dL   33.2    RDW Latest Ref Range: 10.0 - 15.0 %   15.5 (H)    Diff Method Unknown   Automated Method    % Neutrophils Latest Units: %   45.7    % Lymphocytes Latest Units: %   40.7    % Monocytes Latest Units: %   7.7    % Eosinophils Latest Units: %   5.3    % Basophils Latest Units: %   0.3    % Immature Granulocytes Latest Units: %   0.3    Nucleated RBCs Latest Ref Range: 0 /100   1 (H)    Absolute Neutrophil Latest Ref Range: 1.6 - 8.3 10e9/L   1.7    Absolute Lymphocytes Latest Ref Range: 0.8 - 5.3 10e9/L   1.5    Absolute Monocytes Latest Ref Range: 0.0 - 1.3 10e9/L   0.3    Absolute Eosinophils Latest Ref Range: 0.0 - 0.7 10e9/L   0.2    Absolute Basophils Latest Ref Range: 0.0 - 0.2 10e9/L   0.0    Abs Immature Granulocytes Latest Ref Range: 0 - 0.4 10e9/L   0.0    Absolute Nucleated RBC Unknown   0.0    Cyclosporine Last Dose Unknown    Not Provided   Cyclosporine Level Latest Ref Range: 50 - 400 ug/L    94   FINE NEEDLE ASPIRATION Unknown  Rpt     US BIOPSY THYROID FINE NEEDLE ASPIRATION Unknown Rpt

## 2018-07-12 ENCOUNTER — HOSPITAL ENCOUNTER (OUTPATIENT)
Facility: CLINIC | Age: 73
Setting detail: SPECIMEN
Discharge: HOME | End: 2018-07-12
Attending: INTERNAL MEDICINE | Admitting: INTERNAL MEDICINE
Payer: COMMERCIAL

## 2018-07-12 ENCOUNTER — ONCOLOGY VISIT (OUTPATIENT)
Dept: ONCOLOGY | Facility: CLINIC | Age: 73
End: 2018-07-12
Attending: INTERNAL MEDICINE
Payer: COMMERCIAL

## 2018-07-12 DIAGNOSIS — D61.3 IDIOPATHIC APLASTIC ANEMIA (H): ICD-10-CM

## 2018-07-12 DIAGNOSIS — D61.818 PANCYTOPENIA (H): ICD-10-CM

## 2018-07-12 DIAGNOSIS — D61.9 APLASTIC ANEMIA (H): ICD-10-CM

## 2018-07-12 LAB
ANION GAP SERPL CALCULATED.3IONS-SCNC: 5 MMOL/L (ref 3–14)
BASOPHILS # BLD AUTO: 0 10E9/L (ref 0–0.2)
BASOPHILS NFR BLD AUTO: 0 %
BUN SERPL-MCNC: 39 MG/DL (ref 7–30)
CALCIUM SERPL-MCNC: 8.6 MG/DL (ref 8.5–10.1)
CHLORIDE SERPL-SCNC: 115 MMOL/L (ref 94–109)
CO2 SERPL-SCNC: 24 MMOL/L (ref 20–32)
CREAT SERPL-MCNC: 1.32 MG/DL (ref 0.52–1.04)
CYCLOSPORINE BLD LC/MS/MS-MCNC: 68 UG/L (ref 50–400)
DIFFERENTIAL METHOD BLD: ABNORMAL
EOSINOPHIL # BLD AUTO: 0.2 10E9/L (ref 0–0.7)
EOSINOPHIL NFR BLD AUTO: 6.2 %
ERYTHROCYTE [DISTWIDTH] IN BLOOD BY AUTOMATED COUNT: 14.8 % (ref 10–15)
GFR SERPL CREATININE-BSD FRML MDRD: 39 ML/MIN/1.7M2
GLUCOSE SERPL-MCNC: 94 MG/DL (ref 70–99)
HCT VFR BLD AUTO: 25.4 % (ref 35–47)
HGB BLD-MCNC: 8.5 G/DL (ref 11.7–15.7)
IMM GRANULOCYTES # BLD: 0 10E9/L (ref 0–0.4)
IMM GRANULOCYTES NFR BLD: 0.4 %
LYMPHOCYTES # BLD AUTO: 1.2 10E9/L (ref 0.8–5.3)
LYMPHOCYTES NFR BLD AUTO: 42.3 %
MCH RBC QN AUTO: 39.4 PG (ref 26.5–33)
MCHC RBC AUTO-ENTMCNC: 33.5 G/DL (ref 31.5–36.5)
MCV RBC AUTO: 118 FL (ref 78–100)
MONOCYTES # BLD AUTO: 0.2 10E9/L (ref 0–1.3)
MONOCYTES NFR BLD AUTO: 8.4 %
NEUTROPHILS # BLD AUTO: 1.2 10E9/L (ref 1.6–8.3)
NEUTROPHILS NFR BLD AUTO: 42.7 %
NRBC # BLD AUTO: 0 10*3/UL
NRBC BLD AUTO-RTO: 0 /100
PLATELET # BLD AUTO: 63 10E9/L (ref 150–450)
POTASSIUM SERPL-SCNC: 4.5 MMOL/L (ref 3.4–5.3)
RBC # BLD AUTO: 2.16 10E12/L (ref 3.8–5.2)
SODIUM SERPL-SCNC: 144 MMOL/L (ref 133–144)
TME LAST DOSE: NORMAL H
WBC # BLD AUTO: 2.7 10E9/L (ref 4–11)

## 2018-07-12 PROCEDURE — 85025 COMPLETE CBC W/AUTO DIFF WBC: CPT | Performed by: INTERNAL MEDICINE

## 2018-07-12 PROCEDURE — 36415 COLL VENOUS BLD VENIPUNCTURE: CPT

## 2018-07-12 PROCEDURE — 80048 BASIC METABOLIC PNL TOTAL CA: CPT | Performed by: INTERNAL MEDICINE

## 2018-07-12 PROCEDURE — 80158 DRUG ASSAY CYCLOSPORINE: CPT | Performed by: INTERNAL MEDICINE

## 2018-07-12 NOTE — PROGRESS NOTES
Medical Assistant Note:  Bonny Raymundo presents today for yes.    Patient seen by provider today: No.   present during visit today: Not Applicable.    Concerns: No Concerns.    Procedure:  Lab draw site: LAC, Needle type: BF, Gauge: 21. Gauze and coban applied    Post Assessment:  Labs drawn without difficulty: Yes.    Discharge Plan:  Departure Mode: Ambulatory.    Face to Face Time: 5.    Ramya Zabala MA

## 2018-07-12 NOTE — LETTER
7/12/2018         RE: Bonny Raymundo  [address]  [facility] [address]        Dear Colleague,    Thank you for referring your patient, Bonny Raymundo, to the [facility]. Please see a copy of my visit note below.    Medical Assistant Note:  Bonny Raymundo presents today for yes.    Patient seen by provider today: No.   present during visit today: Not Applicable.    Concerns: No Concerns.    Procedure:  Lab draw site: LAC, Needle type: BF, Gauge: 21. Gauze and coban applied    Post Assessment:  Labs drawn without difficulty: Yes.    Discharge Plan:  Departure Mode: Ambulatory.    Face to Face Time: 5.    Ramya Zabala MA              Again, thank you for allowing me to participate in the care of your patient.        Sincerely,        Oncology Nurse

## 2018-07-12 NOTE — MR AVS SNAPSHOT
After Visit Summary   7/12/2018    Bonny Raymundo    MRN: [id number]           Patient Information     Date Of Birth          1945        Visit Information        Provider Department      7/12/2018 10:45 AM Nurse,  Oncology [facility]        Today's Diagnoses     Pancytopenia (H)        Idiopathic aplastic anemia (H)        Aplastic anemia (H)           Follow-ups after your visit        Your next 10 appointments already scheduled     Aug 08, 2018 11:00 AM CDT   Masonic Lab Draw with  MASONIC LAB DRAW   [facility] Masonic Lab Draw ([facility])    [address] [place] [address]  [address]  [facility] [address]   [phone]            Aug 08, 2018 11:40 AM CDT   (Arrive by 11:25 AM)   Return Visit with Celine Walls PA-C   [facility] ([facility])    [address] [place] [address]  [address]  [facility] [address]   [phone]            Sep 27, 2018  2:30 PM CDT   Masonic Lab Draw with  MASONIC LAB DRAW   [facility] Masonic Lab Draw ([facility])    [address] [place] [address]  [address]  [facility] [address]   [phone]            Sep 27, 2018  3:00 PM CDT   RETURN ONC with Rafita Rogel MD   [facility] Blood and Marrow Transplant ([facility])    [address] [place] [address]  [address] [place] [address]   [phone]              Who to contact     If you have questions or need follow up information about today's clinic visit or your schedule please contact [facility] CANCER [facility] directly at [phone].  Normal or non-critical lab and imaging results will be communicated to you by MyChart, letter or phone within 4 business days after the clinic has received the results. If you do not hear from us within 7 days, please contact the clinic through MyChart or phone. If you have a critical or abnormal lab result, we will notify you by phone as soon as  possible.  Submit refill requests through [email or web address] or call your pharmacy and they will forward the refill request to us. Please allow 3 business days for your refill to be completed.          Additional Information About Your Visit        Care EveryWhere ID     This is your Care EveryWhere ID. This could be used by other organizations to access your [place] medical records  [id number]         Blood Pressure from Last 3 Encounters:   06/28/18 134/79   06/05/18 150/81   05/24/18 140/84    Weight from Last 3 Encounters:   06/28/18 52.3 kg (115 lb 3.2 oz)   05/15/18 52.9 kg (116 lb 11.2 oz)   04/30/18 51.7 kg (114 lb)              We Performed the Following     Basic metabolic panel     CBC with platelets differential     Cyclosporine        Primary Care Provider Office Phone # Fax #    Shivani Phelps PA-C [phone] [address]  [place] [address]        Equal Access to Services     [facility]: Hadii aad ku hadasho Soomaali, waaxda luqadaha, qaybta kaalmada adeegyada, waxay idiin hayaan dana strong . So [facility] [phone].    ATENCIÓN: Si habla español, tiene a robertson disposición servicios gratuitos de asistencia lingüística. Ll[facility] [phone].    We comply with applicable federal civil rights laws and [place] laws. We do not discriminate on the basis of race, color, national origin, age, disability, sex, sexual orientation, or gender identity.            Thank you!     Thank you for choosing [facility] CANCER [facility]  for your care. Our goal is always to provide you with excellent care. Hearing back from our patients is one way we can continue to improve our services. Please take a few minutes to complete the written survey that you may receive in the mail after your visit with us. Thank you!             Your Updated Medication List - Protect others around you: Learn how to safely use, store and throw away your medicines at www.disposemymeds.org.          This list is  accurate as of 7/12/18 11:14 AM.  Always use your most recent med list.                   Brand Name Dispense Instructions for use Diagnosis    BENADRYL ALLERGY 25 MG tablet   Generic drug:  diphenhydrAMINE     56 tablet    Take 25 mg by mouth At Bedtime        Calcium + D3 600-200 MG-UNIT Tabs     60 tablet    Take 1 tablet by mouth 2 times daily        chlorpheniramine 4 MG tablet    CHLOR-TRIMETON     Take 4 mg by mouth every 6 hours as needed. For head cold        COMPRESSION STOCKINGS     2 each    Wear compression stockings at 20-30 mmHg rating most time during the day to the affected leg (left leg) or both legs. Take them off at night.    DVT (deep venous thrombosis), left, Postphlebitic syndrome       cycloSPORINE modified 25 MG capsule    GENERIC EQUIVALENT    540 capsule    Take 3 capsules (75 mg) by mouth 2 times daily or as directed    Aplastic anemia (H), Pancytopenia (H), Idiopathic aplastic anemia (H)       eltrombopag 50 MG tablet    PROMACTA    90 tablet    Take 3 tablets (150 mg) by mouth daily Administer on an empty stomach, 1 hour before or 2 hours after a meal. Or as directed    Idiopathic aplastic anemia (H), Pancytopenia (H)       loratadine 10 MG tablet    CLARITIN     Take 10 mg by mouth daily as needed Reported on 4/26/2017        order for DME     1 Box    Equipment being ordered: knee high compression stockings- 18-20 mm    DVT prophylaxis       pantoprazole 40 MG EC tablet    PROTONIX    30 tablet    Take 1 tablet (40 mg) by mouth daily    Gastroesophageal reflux disease without esophagitis       TYLENOL PO      Take 325 mg by mouth every 6 hours as needed for mild pain or fever

## 2018-08-08 ENCOUNTER — ONCOLOGY VISIT (OUTPATIENT)
Dept: ONCOLOGY | Facility: CLINIC | Age: 73
End: 2018-08-08
Attending: PHYSICIAN ASSISTANT
Payer: COMMERCIAL

## 2018-08-08 ENCOUNTER — APPOINTMENT (OUTPATIENT)
Dept: LAB | Facility: CLINIC | Age: 73
End: 2018-08-08
Attending: PHYSICIAN ASSISTANT
Payer: COMMERCIAL

## 2018-08-08 VITALS
DIASTOLIC BLOOD PRESSURE: 75 MMHG | TEMPERATURE: 98.2 F | OXYGEN SATURATION: 98 % | RESPIRATION RATE: 18 BRPM | HEART RATE: 85 BPM | BODY MASS INDEX: 21.84 KG/M2 | HEIGHT: 61 IN | SYSTOLIC BLOOD PRESSURE: 138 MMHG | WEIGHT: 115.7 LBS

## 2018-08-08 DIAGNOSIS — K21.9 GASTROESOPHAGEAL REFLUX DISEASE WITHOUT ESOPHAGITIS: Primary | ICD-10-CM

## 2018-08-08 DIAGNOSIS — D61.3 IDIOPATHIC APLASTIC ANEMIA (H): ICD-10-CM

## 2018-08-08 DIAGNOSIS — D61.818 PANCYTOPENIA (H): ICD-10-CM

## 2018-08-08 DIAGNOSIS — D61.9 APLASTIC ANEMIA (H): ICD-10-CM

## 2018-08-08 LAB
ABO + RH BLD: NORMAL
ABO + RH BLD: NORMAL
ALBUMIN SERPL-MCNC: 3.7 G/DL (ref 3.4–5)
ALP SERPL-CCNC: 82 U/L (ref 40–150)
ALT SERPL W P-5'-P-CCNC: 16 U/L (ref 0–50)
ANION GAP SERPL CALCULATED.3IONS-SCNC: 7 MMOL/L (ref 3–14)
AST SERPL W P-5'-P-CCNC: 14 U/L (ref 0–45)
BASOPHILS # BLD AUTO: 0 10E9/L (ref 0–0.2)
BASOPHILS NFR BLD AUTO: 0 %
BILIRUB SERPL-MCNC: 1 MG/DL (ref 0.2–1.3)
BLD GP AB SCN SERPL QL: NORMAL
BLOOD BANK CMNT PATIENT-IMP: NORMAL
BUN SERPL-MCNC: 43 MG/DL (ref 7–30)
CALCIUM SERPL-MCNC: 9 MG/DL (ref 8.5–10.1)
CHLORIDE SERPL-SCNC: 109 MMOL/L (ref 94–109)
CO2 SERPL-SCNC: 23 MMOL/L (ref 20–32)
CREAT SERPL-MCNC: 1.42 MG/DL (ref 0.52–1.04)
DIFFERENTIAL METHOD BLD: ABNORMAL
EOSINOPHIL # BLD AUTO: 0.2 10E9/L (ref 0–0.7)
EOSINOPHIL NFR BLD AUTO: 7.3 %
ERYTHROCYTE [DISTWIDTH] IN BLOOD BY AUTOMATED COUNT: 14.7 % (ref 10–15)
GFR SERPL CREATININE-BSD FRML MDRD: 36 ML/MIN/1.7M2
GLUCOSE SERPL-MCNC: 80 MG/DL (ref 70–99)
HCT VFR BLD AUTO: 27.3 % (ref 35–47)
HGB BLD-MCNC: 9 G/DL (ref 11.7–15.7)
IMM GRANULOCYTES # BLD: 0 10E9/L (ref 0–0.4)
IMM GRANULOCYTES NFR BLD: 0.4 %
LYMPHOCYTES # BLD AUTO: 1.1 10E9/L (ref 0.8–5.3)
LYMPHOCYTES NFR BLD AUTO: 42.5 %
MCH RBC QN AUTO: 39.3 PG (ref 26.5–33)
MCHC RBC AUTO-ENTMCNC: 33 G/DL (ref 31.5–36.5)
MCV RBC AUTO: 119 FL (ref 78–100)
MONOCYTES # BLD AUTO: 0.2 10E9/L (ref 0–1.3)
MONOCYTES NFR BLD AUTO: 6.5 %
NEUTROPHILS # BLD AUTO: 1.1 10E9/L (ref 1.6–8.3)
NEUTROPHILS NFR BLD AUTO: 43.3 %
NRBC # BLD AUTO: 0 10*3/UL
NRBC BLD AUTO-RTO: 0 /100
PLATELET # BLD AUTO: 66 10E9/L (ref 150–450)
POTASSIUM SERPL-SCNC: 4.6 MMOL/L (ref 3.4–5.3)
PROT SERPL-MCNC: 7.8 G/DL (ref 6.8–8.8)
RBC # BLD AUTO: 2.29 10E12/L (ref 3.8–5.2)
SODIUM SERPL-SCNC: 139 MMOL/L (ref 133–144)
SPECIMEN EXP DATE BLD: NORMAL
WBC # BLD AUTO: 2.6 10E9/L (ref 4–11)

## 2018-08-08 PROCEDURE — 90471 IMMUNIZATION ADMIN: CPT

## 2018-08-08 PROCEDURE — 86850 RBC ANTIBODY SCREEN: CPT | Performed by: INTERNAL MEDICINE

## 2018-08-08 PROCEDURE — 85025 COMPLETE CBC W/AUTO DIFF WBC: CPT | Performed by: INTERNAL MEDICINE

## 2018-08-08 PROCEDURE — 86901 BLOOD TYPING SEROLOGIC RH(D): CPT | Performed by: INTERNAL MEDICINE

## 2018-08-08 PROCEDURE — 86900 BLOOD TYPING SEROLOGIC ABO: CPT | Performed by: INTERNAL MEDICINE

## 2018-08-08 PROCEDURE — G0463 HOSPITAL OUTPT CLINIC VISIT: HCPCS | Mod: 25,ZF

## 2018-08-08 PROCEDURE — 80053 COMPREHEN METABOLIC PANEL: CPT | Performed by: INTERNAL MEDICINE

## 2018-08-08 PROCEDURE — 90750 HZV VACC RECOMBINANT IM: CPT | Mod: ZF | Performed by: PHYSICIAN ASSISTANT

## 2018-08-08 PROCEDURE — 25000125 ZZHC RX 250: Mod: ZF | Performed by: PHYSICIAN ASSISTANT

## 2018-08-08 PROCEDURE — 99214 OFFICE O/P EST MOD 30 MIN: CPT | Mod: ZP | Performed by: PHYSICIAN ASSISTANT

## 2018-08-08 RX ORDER — PANTOPRAZOLE SODIUM 20 MG/1
20 TABLET, DELAYED RELEASE ORAL DAILY
Qty: 30 TABLET | Refills: 11 | Status: SHIPPED | OUTPATIENT
Start: 2018-08-08 | End: 2019-07-11

## 2018-08-08 RX ORDER — CYCLOSPORINE 25 MG/1
75 CAPSULE, LIQUID FILLED ORAL 2 TIMES DAILY
Qty: 180 CAPSULE | Refills: 6 | Status: SHIPPED | OUTPATIENT
Start: 2018-08-08 | End: 2019-03-20

## 2018-08-08 RX ADMIN — ZOSTER VACCINE RECOMBINANT, ADJUVANTED 0.5 ML: KIT at 12:51

## 2018-08-08 ASSESSMENT — PAIN SCALES - GENERAL: PAINLEVEL: NO PAIN (0)

## 2018-08-08 NOTE — NURSING NOTE
"Oncology Rooming Note    August 8, 2018 11:37 AM   Bonny Raymundo is a 72 year old female who presents for:    Chief Complaint   Patient presents with     Oncology Clinic Visit     Return for Aplastic Anemia      Initial Vitals: /75 (BP Location: Right arm, Patient Position: Sitting, Cuff Size: Adult Regular)  Pulse 85  Temp 98.2  F (36.8  C) (Oral)  Resp 18  Ht 1.537 m (5' 0.51\")  Wt 52.5 kg (115 lb 11.2 oz)  SpO2 98%  BMI 22.22 kg/m2 Estimated body mass index is 22.22 kg/(m^2) as calculated from the following:    Height as of this encounter: 1.537 m (5' 0.51\").    Weight as of this encounter: 52.5 kg (115 lb 11.2 oz). Body surface area is 1.5 meters squared.  No Pain (0) Comment: Data Unavailable   No LMP recorded. Patient has had a hysterectomy.  Allergies reviewed: Yes  Medications reviewed: Yes    Medications: MEDICATION REFILLS NEEDED TODAY. Provider was notified.  Pharmacy name entered into [facility]:    [facility], [place] - [address]  [facility], [place] - [address]  [facility], [address]  WRITTEN PRESCRIPTION REQUESTED    Clinical concerns: Results  Sejal was notified.    6  minutes for nursing intake (face to face time)     Janeth Schilling MA              "

## 2018-08-08 NOTE — MR AVS SNAPSHOT
After Visit Summary   8/8/2018    Bonny Raymundo    MRN: [id number]           Patient Information     Date Of Birth          1945        Visit Information        Provider Department      8/8/2018 11:40 AM Celine Walls PA-C [facility] Cancer [facility]        Today's Diagnoses     Gastroesophageal reflux disease without esophagitis    -  1    Pancytopenia (H)        Idiopathic aplastic anemia (H)        Aplastic anemia (H)           Follow-ups after your visit        Your next 10 appointments already scheduled     Sep 05, 2018 12:00 PM CDT   [facility] with Shivani [facility]ps [facility]   [facility]    [address] [facility] [id number] [phone]           Bring a current list of meds and any records pertaining to this visit. For Physicals, please bring immunization records and any forms needing to be filled out. Please arrive 10 minutes early to complete paperwork.            Oct 04, 2018  1:30 PM CDT   Masonic Lab Draw with  MASONIC LAB DRAW   [facility]    [address] [place] [address]  [address] [place] [address] [phone]            Oct 04, 2018  2:00 PM CDT   RETURN ONC with Rafita Rogel MD   [facility] Blood and Marrow Transplant ([facility])    [address] [place] [address]  [address] [place] [address] [phone]            Nov 06, 2018 11:00 AM CST   Masonic Lab Draw with  MASONIC LAB DRAW   [facility] Masonic Lab Draw ([facility])    [address] [place] [address]  [address] [place] [address]   [phone]            Nov 06, 2018 11:40 AM CST   (Arrive by 11:25 AM)   Return Visit with Celine Walls PA-C   [facility] ([facility])    [address] [place] [address]  [address] [place] [address] [phone]              Who to contact     If you have questions or need  "follow up information about today's clinic visit or your schedule please contact [facility] CANCER CLINIC directly at [phone].  Normal or non-critical lab and imaging results will be communicated to you by MyChart, letter or phone within 4 business days after the clinic has received the results. If you do not hear from us within 7 days, please contact the clinic through MyChart or phone. If you have a critical or abnormal lab result, we will notify you by phone as soon as possible.  Submit refill requests through [employer] or call your pharmacy and they will forward the refill request to us. Please allow 3 business days for your refill to be completed.          Additional Information About Your Visit        Care EveryWhere ID     This is your Care EveryWhere ID. This could be used by other organizations to access your [place] medical records  [id number]        Your Vitals Were     Pulse Temperature Respirations Height Pulse Oximetry BMI (Body Mass Index)    85 98.2  F (36.8  C) (Oral) 18 1.537 m (5' 0.51\") 98% 22.22 kg/m2       Blood Pressure from Last 3 Encounters:   08/08/18 138/75   06/28/18 134/79   06/05/18 150/81    Weight from Last 3 Encounters:   08/08/18 52.5 kg (115 lb 11.2 oz)   06/28/18 52.3 kg (115 lb 3.2 oz)   05/15/18 52.9 kg (116 lb 11.2 oz)              We Performed the Following     ABO/Rh type and screen     CBC with platelets differential     Comprehensive metabolic panel          Today's Medication Changes          These changes are accurate as of 8/8/18 11:59 PM.  If you have any questions, ask your nurse or doctor.               Start taking these medicines.        Dose/Directions    pantoprazole 20 MG EC tablet   Commonly known as:  PROTONIX   Used for:  Gastroesophageal reflux disease without esophagitis   Started by:  Celine Walls PA-C        Dose:  20 mg   Take 1 tablet (20 mg) by mouth daily   Quantity:  30 tablet   Refills:  11            Where to get your medicines    "   These medications were sent to [facility],  - [facility] [place], [place] - [address] [facility]  [address] [facility] [address], [place] [address]     Phone:  [phone]     eltrombopag 50 MG tablet         Some of these will need a paper prescription and others can be bought over the counter.  Ask your nurse if you have questions.     Bring a paper prescription for each of these medications     cycloSPORINE modified 25 MG capsule    pantoprazole 20 MG EC tablet                Primary Care Provider Office Phone # Fax #    Shivani Phelps PA-C [phone] [address]   [facility] [address]        Equal Access to Services     CHARLES NOLASCO : Hadii erica pérezo Somichael, waaxda radhaadaha, qaybta kaalmada denzel, ambika strong . So [facility] [place] [phone].    ATENCIÓN: Si habla español, tiene a robertson disposición servicios gratuitos de asistencia lingüística. Llame al [phone].    We comply with applicable federal civil rights laws and [place] laws. We do not discriminate on the basis of race, color, national origin, age, disability, sex, sexual orientation, or gender identity.            Thank you!     Thank you for choosing [facility] CANCER CLINIC  for your care. Our goal is always to provide you with excellent care. Hearing back from our patients is one way we can continue to improve our services. Please take a few minutes to complete the written survey that you may receive in the mail after your visit with us. Thank you!             Your Updated Medication List - Protect others around you: Learn how to safely use, store and throw away your medicines at www.disposemymeds.org.          This list is accurate as of 8/8/18 11:59 PM.  Always use your most recent med list.                   Brand Name Dispense Instructions for use Diagnosis    aspirin 81 MG tablet     30 tablet    Take 1 tablet (81 mg) by mouth daily        BENADRYL ALLERGY 25 MG tablet    Generic drug:  diphenhydrAMINE     56 tablet    Take 25 mg by mouth At Bedtime        Calcium + D3 600-200 MG-UNIT Tabs     60 tablet    Take 1 tablet by mouth 2 times daily        chlorpheniramine 4 MG tablet    CHLOR-TRIMETON     Take 4 mg by mouth every 6 hours as needed. For head cold        COMPRESSION STOCKINGS     2 each    Wear compression stockings at 20-30 mmHg rating most time during the day to the affected leg (left leg) or both legs. Take them off at night.    DVT (deep venous thrombosis), left, Postphlebitic syndrome       cycloSPORINE modified 25 MG capsule    GENERIC EQUIVALENT    180 capsule    Take 3 capsules (75 mg) by mouth 2 times daily or as directed    Aplastic anemia (H), Pancytopenia (H), Idiopathic aplastic anemia (H)       eltrombopag 50 MG tablet    PROMACTA    90 tablet    Take 3 tablets (150 mg) by mouth daily Administer on an empty stomach, 1 hour before or 2 hours after a meal. Or as directed    Idiopathic aplastic anemia (H), Pancytopenia (H)       loratadine 10 MG tablet    CLARITIN     Take 10 mg by mouth daily as needed Reported on 4/26/2017        order for DME     1 Box    Equipment being ordered: knee high compression stockings- 18-20 mm    DVT prophylaxis       pantoprazole 20 MG EC tablet    PROTONIX    30 tablet    Take 1 tablet (20 mg) by mouth daily    Gastroesophageal reflux disease without esophagitis       TYLENOL PO      Take 325 mg by mouth every 6 hours as needed for mild pain or fever

## 2018-08-08 NOTE — PROGRESS NOTES
"[facility] CANCER CLINIC  FOLLOW-UP VISIT NOTE  Date of visit: Aug 8, 2018            REASON FOR VISIT: Aplastic anemia, routine 6 week follow up    HPI: Bonny is a 72 year old female who presented in the fall of 2016 with fatigue and shortness of breath. 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  Her counts showed pancytopenia and BMBX was concerning for aplastic anemia.  By December of 2016 she was transfusion dependent with platelets under 10 k and ANC dropped under 500. 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  She has met with Dr Mcdaniel at [facility] and consulted with Dr Rogel at [facility].      After further consultation it was decided to admit on 1/9/17 for ATG/cyclosporine/prednisone therapy.     The following was her inpatient regimen:  Day 1= 1/9/17  ATG 2500 mg (40 mg/kg0 q24 hours D-4)  Cyclosporine 200 mg (3 mg/kg) PO bid  Eltrombopag 50 mg daily  Methylpred 31 mg (0.5 mg/kg) q24 hours D1-4  Prednisone 60 mg (1 mg/kg) daily after completion of IV methylpred to continue for at least 2 weeks      INTERVAL HISTORY: Bonny is here today for her q6w follow up. Bonny seems to be doing really well.  She is walking her dog and active running erands.  She denies any nausea/vomiting or GERD.  She is eating well and stabilized her weight.  She had a colonoscopy without issues and her thyroid biopsy was negative.  Her back pain is really minimal at this point.      No fevers, chest pain and and no bleeding. Daily BM, no black/blood. No  issues. Her dental issues are all completed now (had some implants).    ROS: complete review of systems was performed which was negative unless noted above.    EXAM:  /75 (BP Location: Right arm, Patient Position: Sitting, Cuff Size: Adult Regular)  Pulse 85  Temp 98.2  F (36.8  C) (Oral)  Resp 18  Ht 1.537 m (5' 0.51\")  Wt 52.5 kg (115 lb 11.2 oz)  SpO2 98%  BMI 22.22 kg/m2  Wt Readings " from Last 4 Encounters:   08/08/18 52.5 kg (115 lb 11.2 oz)   06/28/18 52.3 kg (115 lb 3.2 oz)   05/15/18 52.9 kg (116 lb 11.2 oz)   04/30/18 51.7 kg (114 lb)     General: Patient alert and oriented x3.   EYES: PERRLA, conjunctiva pink  Neck: No palpable cervical, supraclavicular or axillary nodes bilaterally.   Cardiovascular: RRR, no murmurs, gallops or rubs  Respiratory: clear to auscultation bilaterally;  no crackles, rhonchi or wheezing.   Abdomen: positive bowel sounds in all four quadrants, soft, nontender  Neuro: grossly intact.   Mood and affect is stable.       LABS:      6/28/2018 15:17 7/12/2018 11:07 8/8/2018 11:18   WBC 3.8 (L) 2.7 (L) 2.6 (L)   Hemoglobin 8.6 (L) 8.5 (L) 9.0 (L)   Hematocrit 25.9 (L) 25.4 (L) 27.3 (L)   Platelet Count 62 (L) 63 (L) 66 (L)   RBC Count 2.15 (L) 2.16 (L) 2.29 (L)    (H) 118 (H) 119 (H)   MCH 40.0 (H) 39.4 (H) 39.3 (H)   MCHC 33.2 33.5 33.0   RDW 15.5 (H) 14.8 14.7   Diff Method Automated Method Automated Method Automated Method   % Neutrophils 45.7 42.7 43.3   % Lymphocytes 40.7 42.3 42.5   % Monocytes 7.7 8.4 6.5   % Eosinophils 5.3 6.2 7.3   % Basophils 0.3 0.0 0.0   % Immature Granulocytes 0.3 0.4 0.4   Nucleated RBCs 1 (H) 0 0   Absolute Neutrophil 1.7 1.2 (L) 1.1 (L)       ASSESSMENT/PLAN: 72 year old female with AA, now has been transfusion INDEPENDENT since April 2017.    HEME- Treatment for AA 1/9-1/13/17 with ATG, methylpred, cyclosporine and eltrombopag. Has been off her prednisone and ppx medications since spring.  Her stamina has improved since I saw her last and counts are now independent from transfusions since April of 2017. Her CSA levels have been subtherapeutic however she developed renal toxicity with higher doses so we are keeping her at the following doses:  -cyclosporine 75 mg BID  -eltrombopag 150 mg daily  -counts  q6 w  -transfuse if hgb <7.5 or symptomatic and platelets <30k (or prior to dental issues)  -q6 weeks for labs/visits    Renal:  Creat has improved with the lower dose of CSA.  Continue hydration.      HEME-two prior provoked DVT in 2012 and 2014 with travel. 2012 was associated with PE. Was on warfarin/lovenox in the past.  Transitioned to ASA 81 mg.  Off all BP medications    ID-  No fever or symptoms of infection. No ppx medications, CMV was neg.  Completed pentamidine in 2017.    MSK- low back -Low thoracic mid spine pain which has significantly improved.  Has h/o compression fractures and has osteoporosis.  Completed her dental work.    -Wrote orders for her Prolia for 9/27/18.  Every 6 months for age related osteoporosis     FEN: bobby improved, weight stable.      GI: has GERD, will try her at 20 mg Protonix (was 40 mg) and see if she does OK    Health maintenance: sees pcp routinely.  Thyroid biopsy, colonoscopy--WNL.  Gave Shigrix today, repeat q6m    Sejal Walls PA-C

## 2018-09-05 ENCOUNTER — OFFICE VISIT (OUTPATIENT)
Dept: FAMILY MEDICINE | Facility: CLINIC | Age: 73
End: 2018-09-05
Payer: COMMERCIAL

## 2018-09-05 VITALS
OXYGEN SATURATION: 96 % | HEIGHT: 61 IN | DIASTOLIC BLOOD PRESSURE: 88 MMHG | WEIGHT: 117 LBS | HEART RATE: 90 BPM | TEMPERATURE: 97.6 F | SYSTOLIC BLOOD PRESSURE: 136 MMHG | BODY MASS INDEX: 22.09 KG/M2

## 2018-09-05 DIAGNOSIS — H61.23 BILATERAL IMPACTED CERUMEN: ICD-10-CM

## 2018-09-05 DIAGNOSIS — H93.8X3 PLUGGED FEELING IN EAR, BILATERAL: Primary | ICD-10-CM

## 2018-09-05 DIAGNOSIS — Z23 NEED FOR PROPHYLACTIC VACCINATION AND INOCULATION AGAINST INFLUENZA: ICD-10-CM

## 2018-09-05 PROCEDURE — G0008 ADMIN INFLUENZA VIRUS VAC: HCPCS | Performed by: PHYSICIAN ASSISTANT

## 2018-09-05 PROCEDURE — 90662 IIV NO PRSV INCREASED AG IM: CPT | Performed by: PHYSICIAN ASSISTANT

## 2018-09-05 PROCEDURE — 99213 OFFICE O/P EST LOW 20 MIN: CPT | Performed by: PHYSICIAN ASSISTANT

## 2018-09-05 NOTE — PROGRESS NOTES
Injectable Influenza Immunization Documentation    1.  Is the person to be vaccinated sick today?   No    2. Does the person to be vaccinated have an allergy to a component   of the vaccine?   No  Egg Allergy Algorithm Link    3. Has the person to be vaccinated ever had a serious reaction   to influenza vaccine in the past?   No    4. Has the person to be vaccinated ever had Guillain-Barré syndrome?   No    Form completed by Chela Manriquez CMA  Prior to injection verified patient identity using patient's name and date of birth.  Due to injection administration, patient instructed to remain in clinic for 15 minutes  afterwards, and to report any adverse reaction to me immediately.

## 2018-09-05 NOTE — MR AVS SNAPSHOT
After Visit Summary   9/5/2018    Bonny Raymundo    MRN: [id number]           Patient Information     Date Of Birth          1945        Visit Information        Provider Department      9/5/2018 12:00 PM Shivani Phelps PA-C [facility]        Today's Diagnoses     Plugged feeling in ear, bilateral    -  1    Bilateral impacted cerumen           Follow-ups after your visit        Your next 10 appointments already scheduled     Oct 04, 2018  1:30 PM CDT   Masonic Lab Draw with  MASONIC LAB DRAW   [facility] Masonic Lab Draw ([facility])    [address] [place] [address]  [address]  [facility] [address]   [phone]            Oct 04, 2018  2:00 PM CDT   RETURN ONC with Rafita Rogel MD   [facility] Blood and Marrow Transplant ([facility])    [address] [place] [address]  [address]  [facility] [address]   [phone]            Nov 06, 2018 11:00 AM CST   Masonic Lab Draw with  MASONIC LAB DRAW   [facility] Masonic Lab Draw ([facility])    [address] [place] [address]  [address]  [facility] [address]   [phone]            Nov 06, 2018 11:40 AM CST   (Arrive by 11:25 AM)   Return Visit with Celine Walls PA-C   [facility] ([facility])    [address] [place] [address]  [address]  [facility] [address] [phone]              Who to contact     If you have questions or need follow up information about today's clinic visit or your schedule please contact [facility] directly at [phone].  Normal or non-critical lab and imaging results will be communicated to you by MyChart, letter or phone within 4 business days after the clinic has received the results. If you do not hear from us within 7 days, please contact the clinic through MyChart or phone. If you have a critical or abnormal lab result, we will notify you by phone as soon as possible.  Submit  "refill requests through Pixleet or call your pharmacy and they will forward the refill request to us. Please allow 3 business days for your refill to be completed.          Additional Information About Your Visit        Care EveryWhere ID     This is your Care EveryWhere ID. This could be used by other organizations to access your [facility] medical records  [id number]        Your Vitals Were     Pulse Temperature Height Pulse Oximetry Breastfeeding? BMI (Body Mass Index)    90 97.6  F (36.4  C) (Oral) 5' 0.5\" (1.537 m) 96% No 22.47 kg/m2       Blood Pressure from Last 3 Encounters:   09/05/18 136/88   08/08/18 138/75   06/28/18 134/79    Weight from Last 3 Encounters:   09/05/18 117 lb (53.1 kg)   08/08/18 115 lb 11.2 oz (52.5 kg)   06/28/18 115 lb 3.2 oz (52.3 kg)              Today, you had the following     No orders found for display       Primary Care Provider Office Phone # Fax #    Shivani Phelps PA-C [phone] [address] [facility] [address] [facility] [address]  [facility] [address]        Equal Access to Services     [facility] : Hadii aad ku hadasho Sohernandezali, waaxda luqadaha, qaybta kaalmada adeegyada, ambika strong . So [facility] [phone].    ATENCIÓN: Si habla español, tiene a robertson disposición servicios gratuitos de asistencia lingüística. Maria Isabelame al [phone].    We comply with applicable federal civil rights laws and [place] laws. We do not discriminate on the basis of race, color, national origin, age, disability, sex, sexual orientation, or gender identity.            Thank you!     Thank you for choosing [facility]  for your care. Our goal is always to provide you with excellent care. Hearing back from our patients is one way we can continue to improve our services. Please take a few minutes to complete the written survey that you may receive in the mail after your visit with us. Thank you!             Your Updated Medication List - Protect others around you: Learn " how to safely use, store and throw away your medicines at www.disposemymeds.org.          This list is accurate as of 9/5/18  1:01 PM.  Always use your most recent med list.                   Brand Name Dispense Instructions for use Diagnosis    aspirin 81 MG tablet     30 tablet    Take 1 tablet (81 mg) by mouth daily        BENADRYL ALLERGY 25 MG tablet   Generic drug:  diphenhydrAMINE     56 tablet    Take 25 mg by mouth At Bedtime        Calcium + D3 600-200 MG-UNIT Tabs     60 tablet    Take 1 tablet by mouth 2 times daily        chlorpheniramine 4 MG tablet    CHLOR-TRIMETON     Take 4 mg by mouth every 6 hours as needed. For head cold        COMPRESSION STOCKINGS     2 each    Wear compression stockings at 20-30 mmHg rating most time during the day to the affected leg (left leg) or both legs. Take them off at night.    DVT (deep venous thrombosis), left, Postphlebitic syndrome       cycloSPORINE modified 25 MG capsule    GENERIC EQUIVALENT    180 capsule    Take 3 capsules (75 mg) by mouth 2 times daily or as directed    Aplastic anemia (H), Pancytopenia (H), Idiopathic aplastic anemia (H)       eltrombopag 50 MG tablet    PROMACTA    90 tablet    Take 3 tablets (150 mg) by mouth daily Administer on an empty stomach, 1 hour before or 2 hours after a meal. Or as directed    Idiopathic aplastic anemia (H), Pancytopenia (H)       loratadine 10 MG tablet    CLARITIN     Take 10 mg by mouth daily as needed Reported on 4/26/2017        order for DME     1 Box    Equipment being ordered: knee high compression stockings- 18-20 mm    DVT prophylaxis       pantoprazole 20 MG EC tablet    PROTONIX    30 tablet    Take 1 tablet (20 mg) by mouth daily    Gastroesophageal reflux disease without esophagitis       TYLENOL PO      Take 325 mg by mouth every 6 hours as needed for mild pain or fever

## 2018-09-05 NOTE — PROGRESS NOTES
HPI: 71 yo female here with plugged L ear x 3 weeks   She has bilat ear tubs and wonders if something is out of place  Denies any ear pain  She has a clicking tinnitus which is not new  She has see Dr. Krueger  Denies any drainage from her ears.      She has a collapsed arch and wants ref to podiatry    She will be starting Prolia for her bones  She had her flu shot one month ago and her shoulder still hurts.    Past Medical History:   Diagnosis Date     Allergic rhinitis due to other allergen      Aplastic anemia (H) 2017     Closed anterior dislocation of humerus      DVT of lower extremity (deep venous thrombosis) (H) 10-    Left after prolonged sitting-plane     DVT, recurrent, lower extremity, acute (H)      Headache(784.0)      Osteoporosis, unspecified      Pulmonary emboli (H) 10-12     Sensorineural hearing loss, unspecified      Sprain of ankle, unspecified site     L     Past Surgical History:   Procedure Laterality Date     ARTHRODESIS FOOT  11    Hallux valgus R-1st MP joint     BLEPHAROPLASTY BILATERAL  ,      BONE MARROW BIOPSY, BONE SPECIMEN, NEEDLE/TROCAR N/A 2016    Procedure: BIOPSY BONE MARROW;  Surgeon: Mu Morgan MD;  Location: [facility]     BONE MARROW BIOPSY, BONE SPECIMEN, NEEDLE/TROCAR N/A 2016    Procedure: BIOPSY BONE MARROW;  Surgeon: Mu Morgan MD;  Location:  GI     C DEXA INTERPRETATION, AXIAL  03     C NONSPECIFIC PROCEDURE           C NONSPECIFIC PROCEDURE      hysterectomy/BSO     C NONSPECIFIC PROCEDURE      myomectomy (fibroids)     CATARACT IOL, RT/LT Right      CATARACT IOL, RT/LT Left      COLONOSCOPY N/A 2018    Procedure: COLONOSCOPY;  colonoscopy;  Surgeon: Meliton Castrejon MD;  Location:  GI     EXCHANGE INTRAOCULAR LENS IMPLANT Right 2015    Procedure: EXCHANGE INTRAOCULAR LENS IMPLANT;  Surgeon: Garrett Dawson MD;  Location:  [facility]     HC COLONOSCOPY THRU STOMA,  DIAGNOSTIC  5-03    normal- minimal diverticulosis     PICC INSERTION Left 1/9/2017    5fr DL BioFlo PICC, 42cm (2cm external) in the L basilic vein w/ tip in the  SVC RA junction.     VITRECTOMY PARSPLANA WITH 23 GAUGE SYSTEM Right 2/2/2015    Procedure: VITRECTOMY PARSPLANA WITH 23 GAUGE SYSTEM;  Surgeon: Racheal Loyd MD;  Location: [facility]     Social History   Substance Use Topics     Smoking status: Former Smoker     Quit date: 5/14/1973     Smokeless tobacco: Never Used     Alcohol use No      Comment: occasionally     Current Outpatient Prescriptions   Medication Sig Dispense Refill     Acetaminophen (TYLENOL PO) Take 325 mg by mouth every 6 hours as needed for mild pain or fever       aspirin 81 MG tablet Take 1 tablet (81 mg) by mouth daily 30 tablet      Calcium Carb-Cholecalciferol (CALCIUM + D3) 600-200 MG-UNIT TABS Take 1 tablet by mouth 2 times daily 60 tablet      chlorpheniramine (CHLOR-TRIMETON) 4 MG tablet Take 4 mg by mouth every 6 hours as needed. For head cold        COMPRESSION STOCKINGS Wear compression stockings at 20-30 mmHg rating most time during the day to the affected leg (left leg) or both legs. Take them off at night. 2 each 2     cycloSPORINE modified (GENERIC EQUIVALENT) 25 MG capsule Take 3 capsules (75 mg) by mouth 2 times daily or as directed 180 capsule 6     diphenhydrAMINE (BENADRYL ALLERGY) 25 MG tablet Take 25 mg by mouth At Bedtime  56 tablet      eltrombopag (PROMACTA) 50 MG tablet Take 3 tablets (150 mg) by mouth daily Administer on an empty stomach, 1 hour before or 2 hours after a meal. Or as directed 90 tablet 6     loratadine (CLARITIN) 10 MG tablet Take 10 mg by mouth daily as needed Reported on 4/26/2017       ORDER FOR DME Equipment being ordered: knee high compression stockings- 18-20 mm 1 Box 1     pantoprazole (PROTONIX) 20 MG EC tablet Take 1 tablet (20 mg) by mouth daily 30 tablet 11     Allergies   Allergen Reactions     Cats      Dogs      Seasonal  "Allergies        PHYSICAL EXAM:    /88 (BP Location: Right arm, Patient Position: Sitting, Cuff Size: Adult Regular)  Pulse 90  Temp 97.6  F (36.4  C) (Oral)  Ht 5' 0.5\" (1.537 m)  Wt 117 lb (53.1 kg)  SpO2 96%  Breastfeeding? No  BMI 22.47 kg/m2    Patient appears non toxic  L ear: there is thick cerumen easily removed with cerumen spoon and her ear tube is in the cerumen.  There appears to be blood behind her TM which is intact.  R ear: minimal cerumen removed and ear tube appears to be in place    Assessment and Plan:     (H93.8X3) Plugged feeling in ear, bilateral  (primary encounter diagnosis)  Comment: her hearing is worse, was told needs hearing aids. L ear tube fell out.   Plan: f/u with Dr. Krueger in ENT    (H61.23) Bilateral impacted cerumen  Comment:   Plan: removed with cerumen spoon without complications.    *HD flu shot given today.          Shivani Phelps PA-C        "

## 2018-09-13 ENCOUNTER — OFFICE VISIT (OUTPATIENT)
Dept: PODIATRY | Facility: CLINIC | Age: 73
End: 2018-09-13
Payer: COMMERCIAL

## 2018-09-13 ENCOUNTER — TRANSFERRED RECORDS (OUTPATIENT)
Dept: HEALTH INFORMATION MANAGEMENT | Facility: CLINIC | Age: 73
End: 2018-09-13

## 2018-09-13 VITALS
HEIGHT: 60 IN | WEIGHT: 117 LBS | BODY MASS INDEX: 22.97 KG/M2 | DIASTOLIC BLOOD PRESSURE: 70 MMHG | SYSTOLIC BLOOD PRESSURE: 126 MMHG

## 2018-09-13 DIAGNOSIS — L60.3 DYSTROPHIC NAIL: Primary | ICD-10-CM

## 2018-09-13 DIAGNOSIS — M21.42 PES PLANUS OF LEFT FOOT: ICD-10-CM

## 2018-09-13 PROCEDURE — 99203 OFFICE O/P NEW LOW 30 MIN: CPT | Performed by: PODIATRIST

## 2018-09-13 NOTE — MR AVS SNAPSHOT
After Visit Summary   9/13/2018    Bonny Raymundo    MRN: [id number]           Patient Information     Date Of Birth          1945        Visit Information        Provider Department      9/13/2018 9:30 AM Justo Casarez DPM [facility]        Care Instructions    Thank you for choosing [facility]!    DR. CASAREZ'S CLINIC LOCATIONS:   MONDAY - [place] TUESDAY - [place]   [address] [facility]  [address]   [place] [address] [place] [address]   [phone]       THURSDAY AM - [place] THURSDAY PM - [place]   [address]   [place] [address] [place] [address]   [phone]       FRIDAY AM - [place] SET UP SURGERY: [phone] [address] APPOINTMENTS: [phone]   [place] [address] BILLING QUESTIONS: [phone] FAX NUMBER: [phone]     Follow Up: As needed    Body Mass Index (BMI)  Many things can cause foot and ankle problems. Foot structure, activity level, foot mechanics and injuries are common causes of pain. One very important issue that often goes unmentioned, is body weight. Extra weight can cause increased stress on muscles, ligaments, bones and tendons. Sometimes just a few extra pounds is all it takes to put one over her/his threshold. Without reducing that stress, it can be difficult to alleviate pain. Some people are uncomfortable addressing this issue, but we feel it is important for you to think about it. As Foot &  Ankle specialists, our job is addressing the lower extremity problem and possible causes. Regarding extra body weight, we encourage patients to discuss diet and weight management plans with their primary care doctors. It is this team approach that gives you the best opportunity for pain relief and getting you back on your feet.    Patient to follow up with Primary Care provider regarding elevated blood  pressure.      SMOKING CESSATION  What's in cigarette smoke? - Cigarette smoke contains over 4,000 chemicals. Nicotine is one of the main ingredients which is an insecticide/herbicide. It is poisonous to our nervous system, increases blood clotting risk, and decreases the body's defenses to fight off infection. Another chemical is Carbon Monoxide is an asphyxiating gas that permanently binds to blood cells and blocks the transport of oxygen. This leads to tissue death and decreases your metabolism. Tar is a chemical that coats your lungs and trachea which impairs new oxygen coming in and carbon dioxide getting out of your body.   How does smoking impact surgery? - Smoking is particularly hazardous with regards to surgery. Surgery puts stress on the body and a smoker's body is already under strain from these chemicals. Putting the two together, especially for an elective surgery, could be a recipe for disaster. Smoking before and after surgery increases your risk of heart problems, slow wound healing, delayed bone healing, blood clots, wound infection and anesthesia complications.   What are the benefits of quitting? - In 20 minutes your blood pressure will drop back down to normal. In 8 hours the carbon monoxide (a toxic gas) levels in your blood stream will drop by half, and oxygen levels will return to normal. In 48 hours your chance of having a heart attack will have decreased. All nicotine will have left your body. Your sense of taste and smell will return to a normal level. In 72 hours your bronchial tubes will relax, and your energy levels will increase. In 2 weeks your circulation will increase, and it will continue to improve for the next 10 weeks.    Recommendations for elective surgery - Ideally, patients should quit smoking 8 weeks before and at least 2 weeks after elective surgery in order to avoid complications. Simply cutting back on the amount of cigarettes smoked per day does not offer any benefit or  decrease the risk of poor wound healing, heart problems, and infection. Smokers should also start taking Vitamin C and B for two weeks before surgery and two weeks after surgery.    Ways to Stop Smokin. Nicotine patches, lozenges, or gum  2. Support Groups  3. Medications (see below)    List of Medications:  1. Varenicline Tartrate (CHANTIX)   2. Bupropion HCL (WELLBUTRIN, ZYBAN) - note: make sure Wellbutrin is for smoking cessation and not other issues   3. Nicotine Patch (NICODERM)   4. Nicotine Inhaler (NICOTROL)   5. Nicotine Gum Nicotine Polacrilex   6. Nicotine Lozenge: Nicotine Polacrilex (COMMIT)   * [place] offers a smoking support group as well!  Please visit: [email or web address]  If you are interested in these, ask about getting a prescription or talk to your primary care doctor about what may be the best way for you to quit.       FOOT CARE NURSES  If you are interested in having a foot care nurse come out to your   home, please call one of these contacts for more information:  [facility]  [phone] [facility]  [phone]   [facility]  [phone]  [place] [phone]  [place]   [facility]  [phone]  At [facility] [phone]       PRICE THERAPY  Many aches and pains throughout the foot and ankle can be helped with many simple treatments. This is usually described as PRICE Therapy.      P - Protection - often times, inflammation/pain in the lower extremity is not able to improve simply because the areas involved are never allowed to rest. Every step we take can bother the problematic area. Protecting those areas is an important step in the healing process. This may involve a walking cast boot, a special insert/orthotic device, an ankle brace, or simply avoiding barefoot walking.    R - Rest - in addition to protecting the foot/ankle, resting is an important, but often times  difficult, treatment option. Getting off your feet when they bother you, and specifically avoiding activities that cause pain/discomfort, are very beneficial to prevent, and treat, foot/ankle pain.      I - Ice - icing regularly can help to decrease inflammation and swelling in the foot, thus decreasing pain. Using an ice pack or a bag of frozen veggies works very well. Ice for 20 minutes multiple times per day as needed.  Do not place the ice directly on the skin as this can cause tissue damage.    C - Compression - using a compression wrap or an ACE wrap can help to decrease swelling, which can help to decrease pain. Wearing the wraps is generally not needed at night, but they should be worn on a regular basis when you are going to be on your feet for prolonged periods as gravity tends to pull fluids down to your feet/ankles.    E - Elevation - elevating your lower extremities multiple times daily for 15-20 minutes can help to decrease swelling, which works well in decreasing pain levels.    NSAID/Tylenol - Anti-inflammatories like Aleve or ibuprofen, and/or a pain medication, such as Tylenol, can help to improve pain levels and get the issue resolved sooner rather than later. Anyone with liver issues should be careful with Tylenol, and anyone with high blood pressure or heart, stomach or kidney issues should be careful with anti-inflammatories. Please ask if you have questions about these medications, including dosage.    OVER THE COUNTER INSERTS    SuperFeet   Sofsole Fit Spenco   Power Step   Walk-Fit Arch Cradles     Most of these can be found at your local [employer], [employer], or online:    1.  https://www.Travergence.Harvest/en-us/  2.  Https://RETAIL PRO.Harvest/  3.  Https://www.ViSSee.Harvest/    **A good high quality over the counter insert should cost around $40-$50                        Follow-ups after your visit        Your next 10 appointments already scheduled      Oct 04, 2018  1:30 PM CDT   Masonic Lab Draw with  MASONIC LAB DRAW   [facility] Masonic Lab Draw ([facility])    [address]  [address]  [facility] [address]   [phone]            Oct 04, 2018  2:00 PM CDT   RETURN ONC with Rafita Rogel MD   [facility] Blood and Marrow Transplant ([facility])    [address]  [address]  [facility] [address]   [phone]            Nov 06, 2018 11:00 AM CST   Masonic Lab Draw with  MASONIC LAB DRAW   [facility] Masonic Lab Draw ([facility])    [address]  [address]  [facility] [address]   [phone]            Nov 06, 2018 11:40 AM CST   (Arrive by 11:25 AM)   Return Visit with Celine Walls PA-C   [facility] ([facility])    [address] [place] [address]  [address]  [facility] [address] [phone]              Who to contact     If you have questions or need follow up information about today's clinic visit or your schedule please contact [facility] directly at [phone].  Normal or non-critical lab and imaging results will be communicated to you by MyChart, letter or phone within 4 business days after the clinic has received the results. If you do not hear from us within 7 days, please contact the clinic through MyChart or phone. If you have a critical or abnormal lab result, we will notify you by phone as soon as possible.  Submit refill requests through [employer] or call your pharmacy and they will forward the refill request to us. Please allow 3 business days for your refill to be completed.          Additional Information About Your Visit        Care EveryWhere ID     This is your Care EveryWhere ID. This could be used by other organizations to access your [facility] medical records  [id number]        Your Vitals Were     Height BMI (Body Mass Index)                5' (1.524 m) 22.85  kg/m2           Blood Pressure from Last 3 Encounters:   09/13/18 126/70   09/05/18 136/88   08/08/18 138/75    Weight from Last 3 Encounters:   09/13/18 117 lb (53.1 kg)   09/05/18 117 lb (53.1 kg)   08/08/18 115 lb 11.2 oz (52.5 kg)              Today, you had the following     No orders found for display       Primary Care Provider Office Phone # Fax #    Shivani Phelps PA-C [phone] [address]  [facility] [address]        Equal Access to Services     [facility]: Hadii aad ku hadasho Somichael, waaxda luqadaha, qaybta kaalmada adedaveyada, ambika strong . So [facility] [phone].    ATENCIÓN: Si habla español, tiene a robertson disposición servicios gratuitos de asistencia lingüística. [facility] [phone].    We comply with applicable federal civil rights laws and [place] laws. We do not discriminate on the basis of race, color, national origin, age, disability, sex, sexual orientation, or gender identity.            Thank you!     Thank you for choosing [facility]  for your care. Our goal is always to provide you with excellent care. Hearing back from our patients is one way we can continue to improve our services. Please take a few minutes to complete the written survey that you may receive in the mail after your visit with us. Thank you!             Your Updated Medication List - Protect others around you: Learn how to safely use, store and throw away your medicines at www.disposemymeds.org.          This list is accurate as of 9/13/18  9:52 AM.  Always use your most recent med list.                   Brand Name Dispense Instructions for use Diagnosis    aspirin 81 MG tablet     30 tablet    Take 1 tablet (81 mg) by mouth daily        BENADRYL ALLERGY 25 MG tablet   Generic drug:  diphenhydrAMINE     56 tablet    Take 25 mg by mouth At Bedtime        Calcium + D3 600-200 MG-UNIT Tabs     60 tablet    Take 1 tablet by mouth 2 times daily         chlorpheniramine 4 MG tablet    CHLOR-TRIMETON     Take 4 mg by mouth every 6 hours as needed. For head cold        COMPRESSION STOCKINGS     2 each    Wear compression stockings at 20-30 mmHg rating most time during the day to the affected leg (left leg) or both legs. Take them off at night.    DVT (deep venous thrombosis), left, Postphlebitic syndrome       cycloSPORINE modified 25 MG capsule    GENERIC EQUIVALENT    180 capsule    Take 3 capsules (75 mg) by mouth 2 times daily or as directed    Aplastic anemia (H), Pancytopenia (H), Idiopathic aplastic anemia (H)       eltrombopag 50 MG tablet    PROMACTA    90 tablet    Take 3 tablets (150 mg) by mouth daily Administer on an empty stomach, 1 hour before or 2 hours after a meal. Or as directed    Idiopathic aplastic anemia (H), Pancytopenia (H)       loratadine 10 MG tablet    CLARITIN     Take 10 mg by mouth daily as needed Reported on 4/26/2017        order for DME     1 Box    Equipment being ordered: knee high compression stockings- 18-20 mm    DVT prophylaxis       pantoprazole 20 MG EC tablet    PROTONIX    30 tablet    Take 1 tablet (20 mg) by mouth daily    Gastroesophageal reflux disease without esophagitis       TYLENOL PO      Take 325 mg by mouth every 6 hours as needed for mild pain or fever

## 2018-09-13 NOTE — PATIENT INSTRUCTIONS
Thank you for choosing [facility]!    DR. CASAREZ'S CLINIC LOCATIONS:   MONDAY - [place] TUESDAY - [place]   [facility]  [address]   [place] [address] [place] [address]   [phone]       THURSDAY AM - [place] THURSDAY PM - [place]   [address]   [place] [address] [place], [place] [address] [phone]       FRIDAY AM - [place] SET UP SURGERY: [phone] [address] APPOINTMENTS: [phone]   [place] [address] BILLING QUESTIONS: [phone] FAX NUMBER: [phone]     Follow Up: As needed    Body Mass Index (BMI)  Many things can cause foot and ankle problems. Foot structure, activity level, foot mechanics and injuries are common causes of pain. One very important issue that often goes unmentioned, is body weight. Extra weight can cause increased stress on muscles, ligaments, bones and tendons. Sometimes just a few extra pounds is all it takes to put one over her/his threshold. Without reducing that stress, it can be difficult to alleviate pain. Some people are uncomfortable addressing this issue, but we feel it is important for you to think about it. As Foot &  Ankle specialists, our job is addressing the lower extremity problem and possible causes. Regarding extra body weight, we encourage patients to discuss diet and weight management plans with their primary care doctors. It is this team approach that gives you the best opportunity for pain relief and getting you back on your feet.    Patient to follow up with Primary Care provider regarding elevated blood pressure.      SMOKING CESSATION  What's in cigarette smoke? - Cigarette smoke contains over 4,000 chemicals. Nicotine is one of the main ingredients which is an insecticide/herbicide. It is poisonous to our nervous system, increases blood clotting risk, and decreases the body's defenses to fight off  infection. Another chemical is Carbon Monoxide is an asphyxiating gas that permanently binds to blood cells and blocks the transport of oxygen. This leads to tissue death and decreases your metabolism. Tar is a chemical that coats your lungs and trachea which impairs new oxygen coming in and carbon dioxide getting out of your body.   How does smoking impact surgery? - Smoking is particularly hazardous with regards to surgery. Surgery puts stress on the body and a smoker's body is already under strain from these chemicals. Putting the two together, especially for an elective surgery, could be a recipe for disaster. Smoking before and after surgery increases your risk of heart problems, slow wound healing, delayed bone healing, blood clots, wound infection and anesthesia complications.   What are the benefits of quitting? - In 20 minutes your blood pressure will drop back down to normal. In 8 hours the carbon monoxide (a toxic gas) levels in your blood stream will drop by half, and oxygen levels will return to normal. In 48 hours your chance of having a heart attack will have decreased. All nicotine will have left your body. Your sense of taste and smell will return to a normal level. In 72 hours your bronchial tubes will relax, and your energy levels will increase. In 2 weeks your circulation will increase, and it will continue to improve for the next 10 weeks.    Recommendations for elective surgery - Ideally, patients should quit smoking 8 weeks before and at least 2 weeks after elective surgery in order to avoid complications. Simply cutting back on the amount of cigarettes smoked per day does not offer any benefit or decrease the risk of poor wound healing, heart problems, and infection. Smokers should also start taking Vitamin C and B for two weeks before surgery and two weeks after surgery.    Ways to Stop Smokin. Nicotine patches, lozenges, or gum  2. Support Groups  3. Medications (see below)    List  of Medications:  1. Varenicline Tartrate (CHANTIX)   2. Bupropion HCL (WELLBUTRIN, ZYBAN) - note: make sure Wellbutrin is for smoking cessation and not other issues   3. Nicotine Patch (NICODERM)   4. Nicotine Inhaler (NICOTROL)   5. Nicotine Gum Nicotine Polacrilex   6. Nicotine Lozenge: Nicotine Polacrilex (COMMIT)   * [employer] offers a smoking support group as well!  Please visit: [email or web address]  If you are interested in these, ask about getting a prescription or talk to your primary care doctor about what may be the best way for you to quit.       FOOT CARE NURSES  If you are interested in having a foot care nurse come out to your   home, please call one of these contacts for more information:  [facility]  [phone] [facility]  [phone]   [facility]  [phone]  [facility] [phone]  [place]   [facility]  [phone]  At [facility] [phone]       PRICE THERAPY  Many aches and pains throughout the foot and ankle can be helped with many simple treatments. This is usually described as PRICE Therapy.      P - Protection - often times, inflammation/pain in the lower extremity is not able to improve simply because the areas involved are never allowed to rest. Every step we take can bother the problematic area. Protecting those areas is an important step in the healing process. This may involve a walking cast boot, a special insert/orthotic device, an ankle brace, or simply avoiding barefoot walking.    R - Rest - in addition to protecting the foot/ankle, resting is an important, but often times difficult, treatment option. Getting off your feet when they bother you, and specifically avoiding activities that cause pain/discomfort, are very beneficial to prevent, and treat, foot/ankle pain.      I - Ice - icing regularly can help to decrease inflammation and swelling in the foot, thus  decreasing pain. Using an ice pack or a bag of frozen veggies works very well. Ice for 20 minutes multiple times per day as needed.  Do not place the ice directly on the skin as this can cause tissue damage.    C - Compression - using a compression wrap or an ACE wrap can help to decrease swelling, which can help to decrease pain. Wearing the wraps is generally not needed at night, but they should be worn on a regular basis when you are going to be on your feet for prolonged periods as gravity tends to pull fluids down to your feet/ankles.    E - Elevation - elevating your lower extremities multiple times daily for 15-20 minutes can help to decrease swelling, which works well in decreasing pain levels.    NSAID/Tylenol - Anti-inflammatories like Aleve or ibuprofen, and/or a pain medication, such as Tylenol, can help to improve pain levels and get the issue resolved sooner rather than later. Anyone with liver issues should be careful with Tylenol, and anyone with high blood pressure or heart, stomach or kidney issues should be careful with anti-inflammatories. Please ask if you have questions about these medications, including dosage.    OVER THE COUNTER INSERTS    [employer]   Sofsole Fit Spenco   Power Step   Walk-Fit Arch Cradles     Most of these can be found at your local [employer], [employer], or online:    1.  [email or web address]  2.  [email or web address]  3.  [email or web address]/    **A good high quality over the counter insert should cost around $40-$50

## 2018-09-13 NOTE — LETTER
"    9/13/2018         RE: Bonny aRymundo  [address]  [facility] [address]        Dear Colleague,    Thank you for referring your patient, Bonny Raymundo, to the [facility]. Please see a copy of my visit note below.    Foot & Ankle Surgery  September 13, 2018    CC: \"collapsed arch - left foot and toe fungus\"    I was asked to see Bonny Raymundo regarding the chief complaint by:  EM Phelps PA-C    HPI:  Pt is a 72 year old female who presents with above complaint.  Left arch and bilateral hallux nail issues x \"10 ?\".  \"aching while walking/standing\".  Pain 2/10 daily, worse with long distance walking.  She has a brace and a wrap and 20 year old orthotics that actually still help.  Bilateral hallux nails are thick, difficult to cut.     ROS:   Pos for CC.  The patient denies current nausea, vomiting, chills, fevers, belly pain, calf pain, chest pain or SOB.  Complete remainder of ROS is otherwise neg.    VITALS:    Vitals:    09/13/18 0935   BP: 126/70   Weight: 117 lb (53.1 kg)   Height: 5' (1.524 m)       PMH:    Past Medical History:   Diagnosis Date     Allergic rhinitis due to other allergen      Aplastic anemia (H) 1/9/2017     Closed anterior dislocation of humerus      DVT of lower extremity (deep venous thrombosis) (H) 10-12    Left after prolonged sitting-plane     DVT, recurrent, lower extremity, acute (H) 2014     Headache(784.0)      Osteoporosis, unspecified      Pulmonary emboli (H) 10-12     Sensorineural hearing loss, unspecified      Sprain of ankle, unspecified site     L       SXHX:    Past Surgical History:   Procedure Laterality Date     ARTHRODESIS FOOT  7-18-11    Hallux valgus R-1st MP joint     BLEPHAROPLASTY BILATERAL  2012, 2014     BONE MARROW BIOPSY, BONE SPECIMEN, NEEDLE/TROCAR N/A 9/26/2016    Procedure: BIOPSY BONE MARROW;  Surgeon: Mu Morgan MD;  Location: [facility]     BONE MARROW BIOPSY, BONE SPECIMEN, NEEDLE/TROCAR N/A 11/21/2016    Procedure: " BIOPSY BONE MARROW;  Surgeon: Mu Morgan MD;  Location:  GI     C DEXA INTERPRETATION, AXIAL  03     C NONSPECIFIC PROCEDURE           C NONSPECIFIC PROCEDURE      hysterectomy/BSO     C NONSPECIFIC PROCEDURE      myomectomy (fibroids)     CATARACT IOL, RT/LT Right      CATARACT IOL, RT/LT Left      COLONOSCOPY N/A 2018    Procedure: COLONOSCOPY;  colonoscopy;  Surgeon: Meliton Castrejon MD;  Location:  GI     EXCHANGE INTRAOCULAR LENS IMPLANT Right 2015    Procedure: EXCHANGE INTRAOCULAR LENS IMPLANT;  Surgeon: Garrett Dawson MD;  Location:  [facility]     HC COLONOSCOPY THRU STOMA, DIAGNOSTIC      normal- minimal diverticulosis     PICC INSERTION Left 2017    5fr DL BioFlo PICC, 42cm (2cm external) in the L basilic vein w/ tip in the  SVC RA junction.     VITRECTOMY PARSPLANA WITH 23 GAUGE SYSTEM Right 2015    Procedure: VITRECTOMY PARSPLANA WITH 23 GAUGE SYSTEM;  Surgeon: Racheal Loyd MD;  Location:  [facility]        MEDS:    Current Outpatient Prescriptions   Medication     Acetaminophen (TYLENOL PO)     aspirin 81 MG tablet     Calcium Carb-Cholecalciferol (CALCIUM + D3) 600-200 MG-UNIT TABS     chlorpheniramine (CHLOR-TRIMETON) 4 MG tablet     COMPRESSION STOCKINGS     cycloSPORINE modified (GENERIC EQUIVALENT) 25 MG capsule     diphenhydrAMINE (BENADRYL ALLERGY) 25 MG tablet     eltrombopag (PROMACTA) 50 MG tablet     loratadine (CLARITIN) 10 MG tablet     ORDER FOR DME     pantoprazole (PROTONIX) 20 MG EC tablet     No current facility-administered medications for this visit.        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[employer]      Social History Main Topics     Smoking status: Former Smoker     Quit date: 5/14/1973     Smokeless tobacco: Never Used     Alcohol use No      Comment: occasionally     Drug use: No     Sexual activity: Not Currently     Partners: Male     Other Topics Concern     Not on file     Social History Narrative           EXAMINATION:  Gen:   No apparent distress  Neuro:   A&Ox3, no deficits  Psych:    Answering questions appropriately for age and situation with normal affect  Head:    NCAT  Eye:    Visual scanning without deficit  Ear:    Response to auditory stimuli wnl  Lung:    Non-labored breathing on RA noted  Abd:    NTND per patient report  Lymph:    Neg for pitting/non-pitting edema BLE  Vasc:    Pulses palpable, CFT minimally delayed  Neuro:    Light touch sensation intact to all sensory nerve distributions without paresthesias  Derm:    Bilateral hallux nails are thickened, dystrophic without paronychia  MSK:    Left lower extremity - significant collapse of medial arch with forefoot abduction and tenderness along PT tendon  Calf:    Neg for redness, swelling or tenderness    Assessment:  72 year old female with dystrophic hallux nails bilateral; significant pes planus with forefoot abduction      Plan:  Discussed etiologies, anatomy and options  1.  Dystrophic hallux nails bilateral   -discussed treatment options, including regular debridements to thin the nails and minimize shoe gear pressure vs avulsion options  -nail care handout for routine nail care    2.  Significant pes planus with forefoot abduction left lower extremity   -RICE/NSAID vs tylenol prn base on pain  -tensogrip for edema control  -comfortable shoe gear  -continue current orthotics as they are helping her pain  -discussed a new Orthotic Lab referral, bracing and surgery as next options     Follow up:  prn or sooner with acute  issues      Patient's medical history was reviewed today    Body mass index is 22.85 kg/(m^2).          Justo Bennett DPM FACMunising Memorial HospitalFA  Podiatric Foot & Ankle Surgeon  [facility]  [phone]      Again, thank you for allowing me to participate in the care of your patient.        Sincerely,        Justo Bennett DPM, LUCIANO

## 2018-09-13 NOTE — PROGRESS NOTES
"Foot & Ankle Surgery  2018    CC: \"collapsed arch - left foot and toe fungus\"    I was asked to see Bonny Raymundo regarding the chief complaint by:  ME Phelps PA-C    HPI:  Pt is a 72 year old female who presents with above complaint.  Left arch and bilateral hallux nail issues x \"10 ?\".  \"aching while walking/standing\".  Pain 2/10 daily, worse with long distance walking.  She has a brace and a wrap and 20 year old orthotics that actually still help.  Bilateral hallux nails are thick, difficult to cut.     ROS:   Pos for CC.  The patient denies current nausea, vomiting, chills, fevers, belly pain, calf pain, chest pain or SOB.  Complete remainder of ROS is otherwise neg.    VITALS:    Vitals:    18 0935   BP: 126/70   Weight: 117 lb (53.1 kg)   Height: 5' (1.524 m)       PMH:    Past Medical History:   Diagnosis Date     Allergic rhinitis due to other allergen      Aplastic anemia (H) 2017     Closed anterior dislocation of humerus      DVT of lower extremity (deep venous thrombosis) (H) 10-12    Left after prolonged sitting-plane     DVT, recurrent, lower extremity, acute (H)      Headache(784.0)      Osteoporosis, unspecified      Pulmonary emboli (H) 10-12     Sensorineural hearing loss, unspecified      Sprain of ankle, unspecified site     L       SXHX:    Past Surgical History:   Procedure Laterality Date     ARTHRODESIS FOOT  11    Hallux valgus R-1st MP joint     BLEPHAROPLASTY BILATERAL  ,      BONE MARROW BIOPSY, BONE SPECIMEN, NEEDLE/TROCAR N/A 2016    Procedure: BIOPSY BONE MARROW;  Surgeon: Mu Morgan MD;  Location: [facility]     BONE MARROW BIOPSY, BONE SPECIMEN, NEEDLE/TROCAR N/A 2016    Procedure: BIOPSY BONE MARROW;  Surgeon: Mu Morgan MD;  Location:  GI     C DEXA INTERPRETATION, AXIAL  03     C NONSPECIFIC PROCEDURE           C NONSPECIFIC PROCEDURE      hysterectomy/BSO     C NONSPECIFIC PROCEDURE      " myomectomy (fibroids)     CATARACT IOL, RT/LT Right 1988     CATARACT IOL, RT/LT Left 1985     COLONOSCOPY N/A 5/24/2018    Procedure: COLONOSCOPY;  colonoscopy;  Surgeon: Meliton Castrejon MD;  Location:  GI     EXCHANGE INTRAOCULAR LENS IMPLANT Right 2/2/2015    Procedure: EXCHANGE INTRAOCULAR LENS IMPLANT;  Surgeon: Garrett Dawson MD;  Location:  [facility]     HC COLONOSCOPY THRU STOMA, DIAGNOSTIC  5-03    normal- minimal diverticulosis     PICC INSERTION Left 1/9/2017    5fr DL BioFlo PICC, 42cm (2cm external) in the L basilic vein w/ tip in the  SVC RA junction.     VITRECTOMY PARSPLANA WITH 23 GAUGE SYSTEM Right 2/2/2015    Procedure: VITRECTOMY PARSPLANA WITH 23 GAUGE SYSTEM;  Surgeon: Racheal Loyd MD;  Location: [facility]        MEDS:    Current Outpatient Prescriptions   Medication     Acetaminophen (TYLENOL PO)     aspirin 81 MG tablet     Calcium Carb-Cholecalciferol (CALCIUM + D3) 600-200 MG-UNIT TABS     chlorpheniramine (CHLOR-TRIMETON) 4 MG tablet     COMPRESSION STOCKINGS     cycloSPORINE modified (GENERIC EQUIVALENT) 25 MG capsule     diphenhydrAMINE (BENADRYL ALLERGY) 25 MG tablet     eltrombopag (PROMACTA) 50 MG tablet     loratadine (CLARITIN) 10 MG tablet     ORDER FOR DME     pantoprazole (PROTONIX) 20 MG EC tablet     No current facility-administered medications for this visit.        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[employer]      Social History Main Topics     Smoking status: Former Smoker     Quit date: 5/14/1973     Smokeless tobacco: Never Used     Alcohol use No       Comment: occasionally     Drug use: No     Sexual activity: Not Currently     Partners: Male     Other Topics Concern     Not on file     Social History Narrative           EXAMINATION:  Gen:   No apparent distress  Neuro:   A&Ox3, no deficits  Psych:    Answering questions appropriately for age and situation with normal affect  Head:    NCAT  Eye:    Visual scanning without deficit  Ear:    Response to auditory stimuli wnl  Lung:    Non-labored breathing on RA noted  Abd:    NTND per patient report  Lymph:    Neg for pitting/non-pitting edema BLE  Vasc:    Pulses palpable, CFT minimally delayed  Neuro:    Light touch sensation intact to all sensory nerve distributions without paresthesias  Derm:    Bilateral hallux nails are thickened, dystrophic without paronychia  MSK:    Left lower extremity - significant collapse of medial arch with forefoot abduction and tenderness along PT tendon  Calf:    Neg for redness, swelling or tenderness    Assessment:  72 year old female with dystrophic hallux nails bilateral; significant pes planus with forefoot abduction      Plan:  Discussed etiologies, anatomy and options  1.  Dystrophic hallux nails bilateral   -discussed treatment options, including regular debridements to thin the nails and minimize shoe gear pressure vs avulsion options  -nail care handout for routine nail care    2.  Significant pes planus with forefoot abduction left lower extremity   -RICE/NSAID vs tylenol prn base on pain  -tensogrip for edema control  -comfortable shoe gear  -continue current orthotics as they are helping her pain  -discussed a new Orthotic Lab referral, bracing and surgery as next options     Follow up:  prn or sooner with acute issues      Patient's medical history was reviewed today    Body mass index is 22.85 kg/(m^2).          Justo Bennett DPM FACFAS FACFAOM  Podiatric Foot & Ankle Surgeon  [facility]  [phone]

## 2018-10-04 ENCOUNTER — OFFICE VISIT (OUTPATIENT)
Dept: TRANSPLANT | Facility: CLINIC | Age: 73
End: 2018-10-04
Attending: INTERNAL MEDICINE
Payer: COMMERCIAL

## 2018-10-04 ENCOUNTER — APPOINTMENT (OUTPATIENT)
Dept: LAB | Facility: CLINIC | Age: 73
End: 2018-10-04
Attending: INTERNAL MEDICINE
Payer: COMMERCIAL

## 2018-10-04 ENCOUNTER — ALLIED HEALTH/NURSE VISIT (OUTPATIENT)
Dept: ONCOLOGY | Facility: CLINIC | Age: 73
End: 2018-10-04
Attending: INTERNAL MEDICINE
Payer: COMMERCIAL

## 2018-10-04 VITALS
RESPIRATION RATE: 16 BRPM | BODY MASS INDEX: 23.08 KG/M2 | HEART RATE: 79 BPM | OXYGEN SATURATION: 96 % | TEMPERATURE: 97.8 F | DIASTOLIC BLOOD PRESSURE: 53 MMHG | SYSTOLIC BLOOD PRESSURE: 158 MMHG | WEIGHT: 118.2 LBS

## 2018-10-04 DIAGNOSIS — D61.818 PANCYTOPENIA (H): ICD-10-CM

## 2018-10-04 DIAGNOSIS — D61.3 IDIOPATHIC APLASTIC ANEMIA (H): ICD-10-CM

## 2018-10-04 DIAGNOSIS — M80.00XD OSTEOPOROSIS WITH CURRENT PATHOLOGICAL FRACTURE WITH ROUTINE HEALING, UNSPECIFIED OSTEOPOROSIS TYPE, SUBSEQUENT ENCOUNTER: Primary | ICD-10-CM

## 2018-10-04 LAB
ABO + RH BLD: NORMAL
ABO + RH BLD: NORMAL
ALBUMIN SERPL-MCNC: 3.2 G/DL (ref 3.4–5)
ALP SERPL-CCNC: 93 U/L (ref 40–150)
ALT SERPL W P-5'-P-CCNC: 16 U/L (ref 0–50)
ANION GAP SERPL CALCULATED.3IONS-SCNC: 8 MMOL/L (ref 3–14)
AST SERPL W P-5'-P-CCNC: 13 U/L (ref 0–45)
BASOPHILS # BLD AUTO: 0 10E9/L (ref 0–0.2)
BASOPHILS NFR BLD AUTO: 0.3 %
BILIRUB SERPL-MCNC: 1 MG/DL (ref 0.2–1.3)
BLD GP AB SCN SERPL QL: NORMAL
BLOOD BANK CMNT PATIENT-IMP: NORMAL
BUN SERPL-MCNC: 40 MG/DL (ref 7–30)
CALCIUM SERPL-MCNC: 8.5 MG/DL (ref 8.5–10.1)
CHLORIDE SERPL-SCNC: 111 MMOL/L (ref 94–109)
CO2 SERPL-SCNC: 22 MMOL/L (ref 20–32)
CREAT SERPL-MCNC: 1.27 MG/DL (ref 0.52–1.04)
CYCLOSPORINE BLD LC/MS/MS-MCNC: 70 UG/L (ref 50–400)
DIFFERENTIAL METHOD BLD: ABNORMAL
EOSINOPHIL # BLD AUTO: 0.1 10E9/L (ref 0–0.7)
EOSINOPHIL NFR BLD AUTO: 3.4 %
ERYTHROCYTE [DISTWIDTH] IN BLOOD BY AUTOMATED COUNT: 15.3 % (ref 10–15)
GFR SERPL CREATININE-BSD FRML MDRD: 41 ML/MIN/1.7M2
GLUCOSE SERPL-MCNC: 102 MG/DL (ref 70–99)
HCT VFR BLD AUTO: 25.4 % (ref 35–47)
HGB BLD-MCNC: 8.2 G/DL (ref 11.7–15.7)
IMM GRANULOCYTES # BLD: 0 10E9/L (ref 0–0.4)
IMM GRANULOCYTES NFR BLD: 0.3 %
LYMPHOCYTES # BLD AUTO: 1.1 10E9/L (ref 0.8–5.3)
LYMPHOCYTES NFR BLD AUTO: 31.3 %
MCH RBC QN AUTO: 39.4 PG (ref 26.5–33)
MCHC RBC AUTO-ENTMCNC: 32.3 G/DL (ref 31.5–36.5)
MCV RBC AUTO: 122 FL (ref 78–100)
MONOCYTES # BLD AUTO: 0.2 10E9/L (ref 0–1.3)
MONOCYTES NFR BLD AUTO: 5.9 %
NEUTROPHILS # BLD AUTO: 2.1 10E9/L (ref 1.6–8.3)
NEUTROPHILS NFR BLD AUTO: 58.8 %
NRBC # BLD AUTO: 0 10*3/UL
NRBC BLD AUTO-RTO: 1 /100
PLATELET # BLD AUTO: 67 10E9/L (ref 150–450)
POTASSIUM SERPL-SCNC: 4 MMOL/L (ref 3.4–5.3)
PROT SERPL-MCNC: 7.6 G/DL (ref 6.8–8.8)
RBC # BLD AUTO: 2.08 10E12/L (ref 3.8–5.2)
SODIUM SERPL-SCNC: 141 MMOL/L (ref 133–144)
SPECIMEN EXP DATE BLD: NORMAL
TME LAST DOSE: NORMAL H
WBC # BLD AUTO: 3.6 10E9/L (ref 4–11)

## 2018-10-04 PROCEDURE — 80053 COMPREHEN METABOLIC PANEL: CPT | Performed by: INTERNAL MEDICINE

## 2018-10-04 PROCEDURE — 86900 BLOOD TYPING SEROLOGIC ABO: CPT | Performed by: INTERNAL MEDICINE

## 2018-10-04 PROCEDURE — 86901 BLOOD TYPING SEROLOGIC RH(D): CPT | Performed by: INTERNAL MEDICINE

## 2018-10-04 PROCEDURE — 80158 DRUG ASSAY CYCLOSPORINE: CPT | Performed by: INTERNAL MEDICINE

## 2018-10-04 PROCEDURE — 86850 RBC ANTIBODY SCREEN: CPT | Performed by: INTERNAL MEDICINE

## 2018-10-04 PROCEDURE — 85025 COMPLETE CBC W/AUTO DIFF WBC: CPT | Performed by: INTERNAL MEDICINE

## 2018-10-04 PROCEDURE — 25000128 H RX IP 250 OP 636: Mod: ZF | Performed by: PHYSICIAN ASSISTANT

## 2018-10-04 PROCEDURE — 96372 THER/PROPH/DIAG INJ SC/IM: CPT

## 2018-10-04 RX ADMIN — DENOSUMAB 60 MG: 60 INJECTION SUBCUTANEOUS at 15:25

## 2018-10-04 ASSESSMENT — PAIN SCALES - GENERAL: PAINLEVEL: NO PAIN (0)

## 2018-10-04 NOTE — PROGRESS NOTES
Chief Complaint   Patient presents with     Imm/Inj     patient with unspecified osteoporosis type - here for a Prolia injection     Patient arrived to clinic for a Prolia injection today.  Patient declined to speak with an RN today.   Results reviewed, labs MET treatment parameters, ok to proceed with treatment.  Prolia injection given to left arm without incident.  Discharge instructions reviewed with: Patient.  Patient will return sometime in January for next provider appointment.    We discussed when patient will be coming back for her second dose of Prolia, but Dr. Rogel is still in the process of planning that, so I verbalized to patient that she will see the provider before she is due for her next injection so they will decide then and make an appointment.

## 2018-10-04 NOTE — NURSING NOTE
Chief Complaint   Patient presents with     Blood Draw     Per pt JIC tube for Cyclosporine taken, requested labs drawn, vitals completed, pt checked in for next appt.        Zulay Child, CMA Student

## 2018-10-04 NOTE — PROGRESS NOTES
/53  Pulse 79  Temp 97.8  F (36.6  C) (Oral)  Resp 16  Wt 53.6 kg (118 lb 3.2 oz)  SpO2 96%  BMI 23.08 kg/m2  Wt Readings from Last 4 Encounters:   10/04/18 53.6 kg (118 lb 3.2 oz)   09/13/18 53.1 kg (117 lb)   09/05/18 53.1 kg (117 lb)   08/08/18 52.5 kg (115 lb 11.2 oz)      Lisseth returns to follow-up her aplastic anemia.  In the last month or so she has been doing well.  She has had no fever chills GI or  upset.    Her platelet count remains low and I told her to discontinue the low-dose aspirin she was recommended at the last visit.  She has had no external bleeding or bruising.    She is had some fullness in her left ear and maybe lost a PE tube from that site and is having follow-up with ENT in several weeks    Her review of systems is otherwise unrevealing.    Her exam shows only rare petechiae and no bruises.  Her oropharynx is clear.  Her left ear external canal was uninflamed without wax and the right had some edema in the mucosa anteriorly and lots of wax.  Her lungs were clear without rales or wheezing.  Heart tones are regular without a gallop.  Her abdomen was soft and nontender without palpable hepatosplenomegaly or masses.  She no peripheral edema or bone tenderness.    Her blood counts are roughly stable and her creatinine was a bit better than at her last visit.  His cyclosporine level is low at 70 but there is no justification to increase her dose at this time.    We'll recheck CBC and retic at next visit since her HgB is down a bit.    Today she will receive a denosumab (prolia) injection and we will do that periodically.    Again I told her to discontinue the low-dose aspirin she was recommended as her platelet count does not justify its continuation.  She will be seen in 5 or 6 weeks and I will see her again in roughly 3 months.  Her cyclosporine dosing will be unchanged.    Rafita Rogel MD   Professor of medicine    Results for LISSETH PARIKH (MRN [id number]) as of 10/5/2018  05:23   Ref. Range 8/8/2018 11:18 10/4/2018 13:48   Sodium Latest Ref Range: 133 - 144 mmol/L 139 141   Potassium Latest Ref Range: 3.4 - 5.3 mmol/L 4.6 4.0   Chloride Latest Ref Range: 94 - 109 mmol/L 109 111 (H)   Carbon Dioxide Latest Ref Range: 20 - 32 mmol/L 23 22   Urea Nitrogen Latest Ref Range: 7 - 30 mg/dL 43 (H) 40 (H)   Creatinine Latest Ref Range: 0.52 - 1.04 mg/dL 1.42 (H) 1.27 (H)   GFR Estimate Latest Ref Range: >60 mL/min/1.7m2 36 (L) 41 (L)   GFR Estimate If Black Latest Ref Range: >60 mL/min/1.7m2 44 (L) 50 (L)   Calcium Latest Ref Range: 8.5 - 10.1 mg/dL 9.0 8.5   Anion Gap Latest Ref Range: 3 - 14 mmol/L 7 8   Albumin Latest Ref Range: 3.4 - 5.0 g/dL 3.7 3.2 (L)   Protein Total Latest Ref Range: 6.8 - 8.8 g/dL 7.8 7.6   Bilirubin Total Latest Ref Range: 0.2 - 1.3 mg/dL 1.0 1.0   Alkaline Phosphatase Latest Ref Range: 40 - 150 U/L 82 93   ALT Latest Ref Range: 0 - 50 U/L 16 16   AST Latest Ref Range: 0 - 45 U/L 14 13   Glucose Latest Ref Range: 70 - 99 mg/dL 80 102 (H)   WBC Latest Ref Range: 4.0 - 11.0 10e9/L 2.6 (L) 3.6 (L)   Hemoglobin Latest Ref Range: 11.7 - 15.7 g/dL 9.0 (L) 8.2 (L)   Hematocrit Latest Ref Range: 35.0 - 47.0 % 27.3 (L) 25.4 (L)   Platelet Count Latest Ref Range: 150 - 450 10e9/L 66 (L) 67 (L)   RBC Count Latest Ref Range: 3.8 - 5.2 10e12/L 2.29 (L) 2.08 (L)   MCV Latest Ref Range: 78 - 100 fl 119 (H) 122 (H)   MCH Latest Ref Range: 26.5 - 33.0 pg 39.3 (H) 39.4 (H)   MCHC Latest Ref Range: 31.5 - 36.5 g/dL 33.0 32.3   RDW Latest Ref Range: 10.0 - 15.0 % 14.7 15.3 (H)   Diff Method Unknown Automated Method Automated Method   % Neutrophils Latest Units: % 43.3 58.8   % Lymphocytes Latest Units: % 42.5 31.3   % Monocytes Latest Units: % 6.5 5.9   % Eosinophils Latest Units: % 7.3 3.4   % Basophils Latest Units: % 0.0 0.3   % Immature Granulocytes Latest Units: % 0.4 0.3   Nucleated RBCs Latest Ref Range: 0 /100 0 1 (H)   Absolute Neutrophil Latest Ref Range: 1.6 - 8.3 10e9/L 1.1  (L) 2.1   Absolute Lymphocytes Latest Ref Range: 0.8 - 5.3 10e9/L 1.1 1.1   Absolute Monocytes Latest Ref Range: 0.0 - 1.3 10e9/L 0.2 0.2   Absolute Eosinophils Latest Ref Range: 0.0 - 0.7 10e9/L 0.2 0.1   Absolute Basophils Latest Ref Range: 0.0 - 0.2 10e9/L 0.0 0.0   Abs Immature Granulocytes Latest Ref Range: 0 - 0.4 10e9/L 0.0 0.0   Absolute Nucleated RBC Unknown 0.0 0.0   ABO Unknown A A   RH(D) Unknown Pos Pos   Antibody Screen Unknown Neg Neg   Test Valid Only At Latest Units:     [facility]...   Specimen Expires Unknown 08/11/2018 10/07/2018   Cyclosporine Last Dose Unknown  Not Provided   Cyclosporine Level Latest Ref Range: 50 - 400 ug/L  70

## 2018-10-04 NOTE — MR AVS SNAPSHOT
After Visit Summary   10/4/2018    Bonny Raymundo    MRN: [id number]           Patient Information     Date Of Birth          1945        Visit Information        Provider Department      10/4/2018 3:00 PM Nurse, [facility]        Today's Diagnoses     Osteoporosis with current pathological fracture with routine healing, unspecified osteoporosis type, subsequent encounter    -  1       Follow-ups after your visit        Your next 10 appointments already scheduled     Nov 14, 2018 12:00 PM CST   Masonic Lab Draw with  MASONIC LAB DRAW   [facility] Lab Draw ([facility])    [address] [place] [address]  [address]  [facility] [address]   [phone]            Nov 14, 2018 12:30 PM CST   (Arrive by 12:15 PM)   Return Visit with Celine Walls PA-C   [facility] ([facility])    [address] [place] [address]  [address]  [facility] [address]   [phone]            Benton 10, 2019  2:30 PM CST   Masonic Lab Draw with  MASONIC LAB DRAW   [facility])    [address] [place] [address]  [address]  [facility] [address]   [phone]            Benton 10, 2019  3:00 PM CST   RETURN ONC with Rafita Rogel MD   [facility] Blood and Marrow Transplant ([facility])    [address] [place] [address]  [address]  [facility] [address]   [phone]              Future tests that were ordered for you today     Open Future Orders        Priority Expected Expires Ordered    Comprehensive metabolic panel Routine  3/4/2019 10/4/2018    CBC with platelets differential Routine  3/4/2019 10/4/2018    Cyclosporine Routine  3/4/2019 10/4/2018    Reticulocyte count Routine  3/4/2019 10/4/2018            Who to contact     If you have questions or need follow up information about today's clinic visit or your schedule please contact [facility] directly at [phone].  Normal or non-critical lab and imaging results will be communicated to you by MyChart, letter or phone within 4 business days after the clinic has received the results. If you do not hear from us within 7 days, please contact the clinic through [employer] or phone. If you have a critical or abnormal lab result, we will notify you by phone as soon as possible.  Submit refill requests through [employer] or call your pharmacy and they will forward the refill request to us. Please allow 3 business days for your refill to be completed.          Additional Information About Your Visit        [facility] Information     [employer] lets you send messages to your doctor, view your test results, renew your prescriptions, schedule appointments and more. To sign up, go to [email or web address] . Click on \"Log in\" on the left side of the screen, which will take you to the Welcome page. Then click on \"Sign up Now\" on the right side of the page.     You will be asked to enter the access code listed below, as well as some personal information. Please follow the directions to create your username and password.     Your access code is: [email or web address]  Expires: 2019  3:56 PM     Your access code will  in 90 days. If you need help or a new code, please call your [place] clinic or [phone].        Care EveryWhere ID     This is your Care EveryWhere ID. This could be used by other organizations to access your [facility] medical records  [id number]         Blood Pressure from Last 3 Encounters:   10/04/18 158/53   18 126/70   18 136/88    Weight from Last 3 Encounters:   10/04/18 53.6 kg (118 lb 3.2 oz)   18 53.1 kg (117 lb)   18 53.1 kg (117 lb)              Today, you had the following     No orders found for display         Today's Medication Changes          These changes are accurate as of 10/4/18  3:56 PM.  If you have any questions, ask your nurse or doctor. "               Stop taking these medicines if you haven't already. Please contact your care team if you have questions.     aspirin 81 MG tablet   Stopped by:  Rafita Rogel MD                    Primary Care Provider Office Phone # Fax #    Shivani Phelps PA-C [phone] [address]  [place] [address]        Equal Access to Services     [facility]: Hadii aad ku hadasho Soomaali, waaxda luqadaha, qaybta kaalmada adeegyada, waxay idiin hayaan adeeg kharash la'aan . So [facility] [phone].    ATENCIÓN: Si habla espchan, tiene a robertson disposición servicios gratuitos de asistencia lingüística. [place] al [phone].    We comply with applicable federal civil rights laws and [place] laws. We do not discriminate on the basis of race, color, national origin, age, disability, sex, sexual orientation, or gender identity.            Thank you!     Thank you for choosing [facility]  for your care. Our goal is always to provide you with excellent care. Hearing back from our patients is one way we can continue to improve our services. Please take a few minutes to complete the written survey that you may receive in the mail after your visit with us. Thank you!             Your Updated Medication List - Protect others around you: Learn how to safely use, store and throw away your medicines at www.disposemymeds.org.          This list is accurate as of 10/4/18  3:56 PM.  Always use your most recent med list.                   Brand Name Dispense Instructions for use Diagnosis    BENADRYL ALLERGY 25 MG tablet   Generic drug:  diphenhydrAMINE     56 tablet    Take 25 mg by mouth At Bedtime        Calcium + D3 600-200 MG-UNIT Tabs     60 tablet    Take 1 tablet by mouth 2 times daily        chlorpheniramine 4 MG tablet    CHLOR-TRIMETON     Take 4 mg by mouth every 6 hours as needed. For head cold        COMPRESSION STOCKINGS     2 each    Wear compression stockings at  20-30 mmHg rating most time during the day to the affected leg (left leg) or both legs. Take them off at night.    DVT (deep venous thrombosis), left, Postphlebitic syndrome       cycloSPORINE modified 25 MG capsule    GENERIC EQUIVALENT    180 capsule    Take 3 capsules (75 mg) by mouth 2 times daily or as directed    Aplastic anemia (H), Pancytopenia (H), Idiopathic aplastic anemia (H)       eltrombopag 50 MG tablet    PROMACTA    90 tablet    Take 3 tablets (150 mg) by mouth daily Administer on an empty stomach, 1 hour before or 2 hours after a meal. Or as directed    Idiopathic aplastic anemia (H), Pancytopenia (H)       loratadine 10 MG tablet    CLARITIN     Take 10 mg by mouth daily as needed Reported on 4/26/2017        order for DME     1 Box    Equipment being ordered: knee high compression stockings- 18-20 mm    DVT prophylaxis       pantoprazole 20 MG EC tablet    PROTONIX    30 tablet    Take 1 tablet (20 mg) by mouth daily    Gastroesophageal reflux disease without esophagitis       TYLENOL PO      Take 325 mg by mouth every 6 hours as needed for mild pain or fever

## 2018-10-04 NOTE — MR AVS SNAPSHOT
After Visit Summary   10/4/2018    Bonny Raymundo    MRN: [id number]           Patient Information     Date Of Birth          1945        Visit Information        Provider Department      10/4/2018 2:00 PM Rafita Rogel MD [facility] Blood and Marrow Transplant        Today's Diagnoses     Pancytopenia (H)        Idiopathic aplastic anemia (H)              [facility] ([facility])  [address] [place] [address]  Phone: [phone]  Clinic Hours:   Monday-Thursday:7am to 7pm   Friday: 7am to 5pm   Weekends and holidays:    8am to noon (in general)  If your fever is 100.5  or greater,   call the clinic.  After hours call the   hospital at [phone] or   [phone]. Ask for the BMT   fellow on-call            Follow-ups after your visit        Follow-up notes from your care team     Return for Lab Work, Routine Visit.      Your next 10 appointments already scheduled     Nov 14, 2018 12:00 PM CST   Masonic Lab Draw with  MASONIC LAB DRAW   [facility] Mason[facility] Lab Draw ([facility])    [address] [place] [address]  [address] [place] [id number] [phone]            Nov 14, 2018 12:30 PM CST   (Arrive by 12:15 PM)   Return Visit with Celine Walls PA-C   [facility] ([facility])    [address] [place] [address]  [address] [place] [address]   [phone]            B[place] 10, 2019  2:30 PM CST   Masonic Lab Draw with  MASONIC LAB DRAW   [facility]    [address] [place] [address]  [address] [place] [address]   [phone]            Benton 10, 2019  3:00 PM CST   RETURN ONC with Rafita Rogel MD   [facility] and Marrow Transplant ([facility])    [address] [place] [address]   [phone]              Future tests that were ordered for you today     Open Future Orders      "   Priority Expected Expires Ordered    Comprehensive metabolic panel Routine  3/4/2019 10/4/2018    CBC with platelets differential Routine  3/4/2019 10/4/2018    Cyclosporine Routine  3/4/2019 10/4/2018    Reticulocyte count Routine  3/4/2019 10/4/2018            Who to contact     If you have questions or need follow up information about today's clinic visit or your schedule please contact [facility] directly at [phone].  Normal or non-critical lab and imaging results will be communicated to you by [employer], letter or phone within 4 business days after the clinic has received the results. If you do not hear from us within 7 days, please contact the clinic through [employer] or phone. If you have a critical or abnormal lab result, we will notify you by phone as soon as possible.  Submit refill requests through [facility] or call your pharmacy and they will forward the refill request to us. Please allow 3 business days for your refill to be completed.          Additional Information About Your Visit        [facility] Information     [facility] lets you send messages to your doctor, view your test results, renew your prescriptions, schedule appointments and more. To sign up, go to [email or web address] . Click on \"Log in\" on the left side of the screen, which will take you to the Welcome page. Then click on \"Sign up Now\" on the right side of the page.     You will be asked to enter the access code listed below, as well as some personal information. Please follow the directions to create your username and password.     Your access code is: [email or web address]  Expires: 2019  3:56 PM     Your access code will  in 90 days. If you need help or a new code, please call your [facility] clinic or [phone].        Care EveryWhere ID     This is your Care EveryWhere ID. This could be used by other organizations to access your [facility] medical records  [id number]        Your Vitals Were     Pulse " Temperature Respirations Pulse Oximetry BMI (Body Mass Index)       79 97.8  F (36.6  C) (Oral) 16 96% 23.08 kg/m2        Blood Pressure from Last 3 Encounters:   10/04/18 158/53   09/13/18 126/70   09/05/18 136/88    Weight from Last 3 Encounters:   10/04/18 53.6 kg (118 lb 3.2 oz)   09/13/18 53.1 kg (117 lb)   09/05/18 53.1 kg (117 lb)              We Performed the Following     ABO/Rh type and screen     CBC with platelets differential     Comprehensive metabolic panel     Cyclosporine          Today's Medication Changes          These changes are accurate as of 10/4/18 11:59 PM.  If you have any questions, ask your nurse or doctor.               Stop taking these medicines if you haven't already. Please contact your care team if you have questions.     aspirin 81 MG tablet   Stopped by:  Rafita Rogel MD                    Recent Review Flowsheet Data     BMT Recent Results Latest Ref Rng & Units 3/14/2018 4/5/2018 5/15/2018 6/28/2018 7/12/2018 8/8/2018 10/4/2018    WBC 4.0 - 11.0 10e9/L 3.5(L) 3.5(L) 3.2(L) 3.8(L) 2.7(L) 2.6(L) 3.6(L)    Hemoglobin 11.7 - 15.7 g/dL 8.6(L) 8.8(L) 8.9(L) 8.6(L) 8.5(L) 9.0(L) 8.2(L)    Platelet Count 150 - 450 10e9/L 53(L) 54(L) 51(L) 62(L) 63(L) 66(L) 67(L)    Neutrophils (Absolute) 1.6 - 8.3 10e9/L 1.6 1.7 1.1(L) 1.7 1.2(L) 1.1(L) 2.1    INR 0.86 - 1.14 - - - - - - -    Sodium 133 - 144 mmol/L 140 140 140 142 144 139 141    Potassium 3.4 - 5.3 mmol/L 4.4 4.5 4.4 4.4 4.5 4.6 4.0    Chloride 94 - 109 mmol/L 110(H) 111(H) 111(H) 112(H) 115(H) 109 111(H)    Glucose 70 - 99 mg/dL 110(H) 107(H) 90 99 94 80 102(H)    Urea Nitrogen 7 - 30 mg/dL 40(H) 35(H) 36(H) 51(H) 39(H) 43(H) 40(H)    Creatinine 0.52 - 1.04 mg/dL 1.23(H) 1.18(H) 1.18(H) 1.66(H) 1.32(H) 1.42(H) 1.27(H)    Calcium (Total) 8.5 - 10.1 mg/dL 8.8 8.8 8.7 8.7 8.6 9.0 8.5    Protein (Total) 6.8 - 8.8 g/dL 7.1 7.4 7.4 7.5 - 7.8 7.6    Albumin 3.4 - 5.0 g/dL 3.5 3.4 3.8 3.6 - 3.7 3.2(L)    Bilirubin (Direct) 0.0 -  0.2 mg/dL - - - - - - -    Alkaline Phosphatase 40 - 150 U/L 81 84 86 76 - 82 93    AST 0 - 45 U/L 12 12 15 16 - 14 13    ALT 0 - 50 U/L 13 14 14 17 - 16 16    MCV 78 - 100 fl 119(H) 117(H) 120(H) 121(H) 118(H) 119(H) 122(H)               Primary Care Provider Office Phone # Fax #    Shivani Lauren Phelps PA-C [phone] [address]  [place] [address]        Equal Access to Services     [facility] : Hadii aad ku hadasho Soomaali, waaxda luqadaha, qaybta kaalmada adeegyada, ambika bone adedave strong . So [facility] [phone].    ATENCIÓN: Si habla español, tiene a robertson disposición servicios gratuitos de asistencia lingüística. [facility] [phone].    We comply with applicable federal civil rights laws and [place] laws. We do not discriminate on the basis of race, color, national origin, age, disability, sex, sexual orientation, or gender identity.            Thank you!     Thank you for choosing [facility] BLOOD [facility]ARROW TRANSPLANT  for your care. Our goal is always to provide you with excellent care. Hearing back from our patients is one way we can continue to improve our services. Please take a few minutes to complete the written survey that you may receive in the mail after your visit with us. Thank you!             Your Updated Medication List - Protect others around you: Learn how to safely use, store and throw away your medicines at www.disposemymeds.org.          This list is accurate as of 10/4/18 11:59 PM.  Always use your most recent med list.                   Brand Name Dispense Instructions for use Diagnosis    BENADRYL ALLERGY 25 MG tablet   Generic drug:  diphenhydrAMINE     56 tablet    Take 25 mg by mouth At Bedtime        Calcium + D3 600-200 MG-UNIT Tabs     60 tablet    Take 1 tablet by mouth 2 times daily        chlorpheniramine 4 MG tablet    CHLOR-TRIMETON     Take 4 mg by mouth every 6 hours as needed. For head cold        COMPRESSION STOCKINGS     2  each    Wear compression stockings at 20-30 mmHg rating most time during the day to the affected leg (left leg) or both legs. Take them off at night.    DVT (deep venous thrombosis), left, Postphlebitic syndrome       cycloSPORINE modified 25 MG capsule    GENERIC EQUIVALENT    180 capsule    Take 3 capsules (75 mg) by mouth 2 times daily or as directed    Aplastic anemia (H), Pancytopenia (H), Idiopathic aplastic anemia (H)       eltrombopag 50 MG tablet    PROMACTA    90 tablet    Take 3 tablets (150 mg) by mouth daily Administer on an empty stomach, 1 hour before or 2 hours after a meal. Or as directed    Idiopathic aplastic anemia (H), Pancytopenia (H)       loratadine 10 MG tablet    CLARITIN     Take 10 mg by mouth daily as needed Reported on 4/26/2017        order for DME     1 Box    Equipment being ordered: knee high compression stockings- 18-20 mm    DVT prophylaxis       pantoprazole 20 MG EC tablet    PROTONIX    30 tablet    Take 1 tablet (20 mg) by mouth daily    Gastroesophageal reflux disease without esophagitis       TYLENOL PO      Take 325 mg by mouth every 6 hours as needed for mild pain or fever

## 2018-10-04 NOTE — LETTER
10/4/2018      RE: Bonny Raymundo  [address]  [facility] [address]       /53  Pulse 79  Temp 97.8  F (36.6  C) (Oral)  Resp 16  Wt 53.6 kg (118 lb 3.2 oz)  SpO2 96%  BMI 23.08 kg/m2  Wt Readings from Last 4 Encounters:   10/04/18 53.6 kg (118 lb 3.2 oz)   09/13/18 53.1 kg (117 lb)   09/05/18 53.1 kg (117 lb)   08/08/18 52.5 kg (115 lb 11.2 oz)      Bonny returns to follow-up her aplastic anemia.  In the last month or so she has been doing well.  She has had no fever chills GI or  upset.    Her platelet count remains low and I told her to discontinue the low-dose aspirin she was recommended at the last visit.  She has had no external bleeding or bruising.    She is had some fullness in her left ear and maybe lost a PE tube from that site and is having follow-up with ENT in several weeks    Her review of systems is otherwise unrevealing.    Her exam shows only rare petechiae and no bruises.  Her oropharynx is clear.  Her left ear external canal was uninflamed without wax and the right had some edema in the mucosa anteriorly and lots of wax.  Her lungs were clear without rales or wheezing.  Heart tones are regular without a gallop.  Her abdomen was soft and nontender without palpable hepatosplenomegaly or masses.  She no peripheral edema or bone tenderness.    Her blood counts are roughly stable and her creatinine was a bit better than at her last visit.  His cyclosporine level is low at 70 but there is no justification to increase her dose at this time.    We'll recheck CBC and retic at next visit since her HgB is down a bit.    Today she will receive a denosumab (prolia) injection and we will do that periodically.    Again I told her to discontinue the low-dose aspirin she was recommended as her platelet count does not justify its continuation.  She will be seen in 5 or 6 weeks and I will see her again in roughly 3 months.  Her cyclosporine dosing will be unchanged.    Rafita Rogel MD    Professor of medicine    Results for LISSETH PARIKH (MRN [id number]) as of 10/5/2018 05:23   Ref. Range 8/8/2018 11:18 10/4/2018 13:48   Sodium Latest Ref Range: 133 - 144 mmol/L 139 141   Potassium Latest Ref Range: 3.4 - 5.3 mmol/L 4.6 4.0   Chloride Latest Ref Range: 94 - 109 mmol/L 109 111 (H)   Carbon Dioxide Latest Ref Range: 20 - 32 mmol/L 23 22   Urea Nitrogen Latest Ref Range: 7 - 30 mg/dL 43 (H) 40 (H)   Creatinine Latest Ref Range: 0.52 - 1.04 mg/dL 1.42 (H) 1.27 (H)   GFR Estimate Latest Ref Range: >60 mL/min/1.7m2 36 (L) 41 (L)   GFR Estimate If Black Latest Ref Range: >60 mL/min/1.7m2 44 (L) 50 (L)   Calcium Latest Ref Range: 8.5 - 10.1 mg/dL 9.0 8.5   Anion Gap Latest Ref Range: 3 - 14 mmol/L 7 8   Albumin Latest Ref Range: 3.4 - 5.0 g/dL 3.7 3.2 (L)   Protein Total Latest Ref Range: 6.8 - 8.8 g/dL 7.8 7.6   Bilirubin Total Latest Ref Range: 0.2 - 1.3 mg/dL 1.0 1.0   Alkaline Phosphatase Latest Ref Range: 40 - 150 U/L 82 93   ALT Latest Ref Range: 0 - 50 U/L 16 16   AST Latest Ref Range: 0 - 45 U/L 14 13   Glucose Latest Ref Range: 70 - 99 mg/dL 80 102 (H)   WBC Latest Ref Range: 4.0 - 11.0 10e9/L 2.6 (L) 3.6 (L)   Hemoglobin Latest Ref Range: 11.7 - 15.7 g/dL 9.0 (L) 8.2 (L)   Hematocrit Latest Ref Range: 35.0 - 47.0 % 27.3 (L) 25.4 (L)   Platelet Count Latest Ref Range: 150 - 450 10e9/L 66 (L) 67 (L)   RBC Count Latest Ref Range: 3.8 - 5.2 10e12/L 2.29 (L) 2.08 (L)   MCV Latest Ref Range: 78 - 100 fl 119 (H) 122 (H)   MCH Latest Ref Range: 26.5 - 33.0 pg 39.3 (H) 39.4 (H)   MCHC Latest Ref Range: 31.5 - 36.5 g/dL 33.0 32.3   RDW Latest Ref Range: 10.0 - 15.0 % 14.7 15.3 (H)   Diff Method Unknown Automated Method Automated Method   % Neutrophils Latest Units: % 43.3 58.8   % Lymphocytes Latest Units: % 42.5 31.3   % Monocytes Latest Units: % 6.5 5.9   % Eosinophils Latest Units: % 7.3 3.4   % Basophils Latest Units: % 0.0 0.3   % Immature Granulocytes Latest Units: % 0.4 0.3   Nucleated RBCs  Latest Ref Range: 0 /100 0 1 (H)   Absolute Neutrophil Latest Ref Range: 1.6 - 8.3 10e9/L 1.1 (L) 2.1   Absolute Lymphocytes Latest Ref Range: 0.8 - 5.3 10e9/L 1.1 1.1   Absolute Monocytes Latest Ref Range: 0.0 - 1.3 10e9/L 0.2 0.2   Absolute Eosinophils Latest Ref Range: 0.0 - 0.7 10e9/L 0.2 0.1   Absolute Basophils Latest Ref Range: 0.0 - 0.2 10e9/L 0.0 0.0   Abs Immature Granulocytes Latest Ref Range: 0 - 0.4 10e9/L 0.0 0.0   Absolute Nucleated RBC Unknown 0.0 0.0   ABO Unknown A A   RH(D) Unknown Pos Pos   Antibody Screen Unknown Neg Neg   Test Valid Only At Latest Units:     [facility]..   Specimen Expires Unknown 08/11/2018 10/07/2018   Cyclosporine Last Dose Unknown  Not Provided   Cyclosporine Level Latest Ref Range: 50 - 400 ug/L  70           Rafita Rogel MD

## 2018-10-08 ENCOUNTER — OFFICE VISIT (OUTPATIENT)
Dept: FAMILY MEDICINE | Facility: CLINIC | Age: 73
End: 2018-10-08
Payer: COMMERCIAL

## 2018-10-08 VITALS
HEIGHT: 60 IN | TEMPERATURE: 97 F | BODY MASS INDEX: 23.16 KG/M2 | DIASTOLIC BLOOD PRESSURE: 72 MMHG | SYSTOLIC BLOOD PRESSURE: 114 MMHG | HEART RATE: 87 BPM | OXYGEN SATURATION: 97 % | WEIGHT: 118 LBS

## 2018-10-08 DIAGNOSIS — N39.0 URINARY TRACT INFECTION WITHOUT HEMATURIA, SITE UNSPECIFIED: ICD-10-CM

## 2018-10-08 DIAGNOSIS — R35.0 URINARY FREQUENCY: Primary | ICD-10-CM

## 2018-10-08 LAB
ALBUMIN UR-MCNC: 30 MG/DL
APPEARANCE UR: CLEAR
BACTERIA #/AREA URNS HPF: ABNORMAL /HPF
BILIRUB UR QL STRIP: NEGATIVE
COLOR UR AUTO: YELLOW
GLUCOSE UR STRIP-MCNC: NEGATIVE MG/DL
HGB UR QL STRIP: NEGATIVE
KETONES UR STRIP-MCNC: NEGATIVE MG/DL
LEUKOCYTE ESTERASE UR QL STRIP: ABNORMAL
NITRATE UR QL: POSITIVE
NON-SQ EPI CELLS #/AREA URNS LPF: ABNORMAL /LPF
PH UR STRIP: 5.5 PH (ref 5–7)
RBC #/AREA URNS AUTO: ABNORMAL /HPF
SOURCE: ABNORMAL
SP GR UR STRIP: 1.01 (ref 1–1.03)
UROBILINOGEN UR STRIP-ACNC: 1 EU/DL (ref 0.2–1)
WBC #/AREA URNS AUTO: >100 /HPF

## 2018-10-08 PROCEDURE — 87088 URINE BACTERIA CULTURE: CPT | Performed by: PHYSICIAN ASSISTANT

## 2018-10-08 PROCEDURE — 87086 URINE CULTURE/COLONY COUNT: CPT | Performed by: PHYSICIAN ASSISTANT

## 2018-10-08 PROCEDURE — 81001 URINALYSIS AUTO W/SCOPE: CPT | Performed by: PHYSICIAN ASSISTANT

## 2018-10-08 PROCEDURE — 87186 SC STD MICRODIL/AGAR DIL: CPT | Mod: 59 | Performed by: PHYSICIAN ASSISTANT

## 2018-10-08 PROCEDURE — 99212 OFFICE O/P EST SF 10 MIN: CPT | Performed by: PHYSICIAN ASSISTANT

## 2018-10-08 RX ORDER — CIPROFLOXACIN 250 MG/1
250 TABLET, FILM COATED ORAL 2 TIMES DAILY
Qty: 10 TABLET | Refills: 0 | Status: SHIPPED | OUTPATIENT
Start: 2018-10-08 | End: 2018-11-14

## 2018-10-08 NOTE — MR AVS SNAPSHOT
After Visit Summary   10/8/2018    Bonny Raymundo    MRN: [id number]           Patient Information     Date Of Birth          1945        Visit Information        Provider Department      10/8/2018 1:00 PM Shivani Phelps PA-C [facility]        Today's Diagnoses     Urinary frequency    -  1    Urinary tract infection without hematuria, site unspecified           Follow-ups after your visit        Your next 10 appointments already scheduled     Nov 14, 2018 12:00 PM CST   [facility] Draw with  [facility]   [facility] Draw ([facility])    [address] [place] [address]  [address]  [facility] [address]   [phone]            Nov 14, 2018 12:30 PM CST   (Arrive by 12:15 PM)   Return Visit with Celine Walls PA-C   [facility] ([facility])    [address] [place] [address]  [address]  [facility] [address]   [phone]            [place] 10, 2019  2:30 PM CST   [facility] Lab Draw with  [facility] DRAW   [facility] Draw ([facility])    [address] [place] [address]  [address]  [facility] [address]   [phone]            [place] 10, 2019  3:00 PM CST   RETURN ONC with Rafita Rogel MD   [facility] Mar[facility] Trans[facility] ([facility])    [address] [place] [address]  [address] [place] [id number] [phone]              Who to contact     If you have questions or need follow up information about today's clinic visit or your schedule please contact [facility] [place] directly at [phone].  Normal or non-critical lab and imaging results will be communicated to you by MyChart, letter or phone within 4 business days after the clinic has received the results. If you do not hear from us within 7 days, please contact the clinic through MyChart or phone. If you have a critical or abnormal lab result, we will notify you by phone as soon as  "possible.  Submit refill requests through [facility] or call your pharmacy and they will forward the refill request to us. Please allow 3 business days for your refill to be completed.          Additional Information About Your Visit        [facility] Information     [facility] lets you send messages to your doctor, view your test results, renew your prescriptions, schedule appointments and more. To sign up, go to [email or web address] . Click on \"Log in\" on the left side of the screen, which will take you to the Welcome page. Then click on \"Sign up Now\" on the right side of the page.     You will be asked to enter the access code listed below, as well as some personal information. Please follow the directions to create your username and password.     Your access code is: [email or web address]  Expires: 2019  3:56 PM     Your access code will  in 90 days. If you need help or a new code, please call your [facility] clinic or [phone].        Care EveryWhere ID     This is your Care EveryWhere ID. This could be used by other organizations to access your [facility] medical records  [id number]        Your Vitals Were     Pulse Temperature Height Pulse Oximetry Breastfeeding? BMI (Body Mass Index)    87 97  F (36.1  C) (Oral) 5' (1.524 m) 97% No 23.05 kg/m2       Blood Pressure from Last 3 Encounters:   10/08/18 114/72   10/04/18 158/53   18 126/70    Weight from Last 3 Encounters:   10/08/18 118 lb (53.5 kg)   10/04/18 118 lb 3.2 oz (53.6 kg)   18 117 lb (53.1 kg)              We Performed the Following     *UA reflex to Microscopic and Culture ([place] and [facility] (except [place] and [place])          Today's Medication Changes          These changes are accurate as of 10/8/18  1:23 PM.  If you have any questions, ask your nurse or doctor.               Start taking these medicines.        Dose/Directions    ciprofloxacin 250 MG tablet   Commonly known as:  CIPRO   Used for:  Urinary tract infection " without hematuria, site unspecified   Started by:  Shivani Phelps PA-C        Dose:  250 mg   Take 1 tablet (250 mg) by mouth 2 times daily   Quantity:  10 tablet   Refills:  0            Where to get your medicines      These medications were sent to [facility]/pharmacy #[id number] - [place] - [address]  [address] [address]     Phone:  [phone]     ciprofloxacin 250 MG tablet                Primary Care Provider Office Phone # Fax #    Shivani Phelps PA-C [phone] [address]  [facility] [address]        Equal Access to Services     [facility]: Hadii aad ku hadasho Sohernandezali, waaxda luqadaha, qaybta kaalmada adeegyada, ambika strong . So [facility] [phone].    ATENCIÓN: Si habla español, tiene a robertson disposición servicios gratuitos de asistencia lingüística. [facility] [phone].    We comply with applicable federal civil rights laws and [place] laws. We do not discriminate on the basis of race, color, national origin, age, disability, sex, sexual orientation, or gender identity.            Thank you!     Thank you for choosing [facility]  for your care. Our goal is always to provide you with excellent care. Hearing back from our patients is one way we can continue to improve our services. Please take a few minutes to complete the written survey that you may receive in the mail after your visit with us. Thank you!             Your Updated Medication List - Protect others around you: Learn how to safely use, store and throw away your medicines at www.disposemymeds.org.          This list is accurate as of 10/8/18  1:23 PM.  Always use your most recent med list.                   Brand Name Dispense Instructions for use Diagnosis    BENADRYL ALLERGY 25 MG tablet   Generic drug:  diphenhydrAMINE     56 tablet    Take 25 mg by mouth At Bedtime        Calcium + D3 600-200 MG-UNIT Tabs     60 tablet    Take 1 tablet by mouth 2 times  daily        chlorpheniramine 4 MG tablet    CHLOR-TRIMETON     Take 4 mg by mouth every 6 hours as needed. For head cold        ciprofloxacin 250 MG tablet    CIPRO    10 tablet    Take 1 tablet (250 mg) by mouth 2 times daily    Urinary tract infection without hematuria, site unspecified       COMPRESSION STOCKINGS     2 each    Wear compression stockings at 20-30 mmHg rating most time during the day to the affected leg (left leg) or both legs. Take them off at night.    DVT (deep venous thrombosis), left, Postphlebitic syndrome       cycloSPORINE modified 25 MG capsule    GENERIC EQUIVALENT    180 capsule    Take 3 capsules (75 mg) by mouth 2 times daily or as directed    Aplastic anemia (H), Pancytopenia (H), Idiopathic aplastic anemia (H)       eltrombopag 50 MG tablet    PROMACTA    90 tablet    Take 3 tablets (150 mg) by mouth daily Administer on an empty stomach, 1 hour before or 2 hours after a meal. Or as directed    Idiopathic aplastic anemia (H), Pancytopenia (H)       loratadine 10 MG tablet    CLARITIN     Take 10 mg by mouth daily as needed Reported on 4/26/2017        order for DME     1 Box    Equipment being ordered: knee high compression stockings- 18-20 mm    DVT prophylaxis       pantoprazole 20 MG EC tablet    PROTONIX    30 tablet    Take 1 tablet (20 mg) by mouth daily    Gastroesophageal reflux disease without esophagitis       TYLENOL PO      Take 325 mg by mouth every 6 hours as needed for mild pain or fever

## 2018-10-08 NOTE — PROGRESS NOTES
HPI: 71 yo female with aplastic anemia here with urinary sxs  States she has had urinary freq x 2 weeks  Denies any dysuria  Denies gross hematuria  Denies cloudy urine or odor  Denies flank pain or fever  Caffeine: tea one per day one 16oz Pepsi per day    Past Medical History:   Diagnosis Date     Allergic rhinitis due to other allergen      Aplastic anemia (H) 2017     Closed anterior dislocation of humerus      DVT of lower extremity (deep venous thrombosis) (H) 10-12    Left after prolonged sitting-plane     DVT, recurrent, lower extremity, acute (H)      Headache(784.0)      Osteoporosis, unspecified      Pulmonary emboli (H) 10-12     Sensorineural hearing loss, unspecified      Sprain of ankle, unspecified site     L     Past Surgical History:   Procedure Laterality Date     ARTHRODESIS FOOT  11    Hallux valgus R-1st MP joint     BLEPHAROPLASTY BILATERAL  ,      BONE MARROW BIOPSY, BONE SPECIMEN, NEEDLE/TROCAR N/A 2016    Procedure: BIOPSY BONE MARROW;  Surgeon: Mu Morgan MD;  Location:  GI     BONE MARROW BIOPSY, BONE SPECIMEN, NEEDLE/TROCAR N/A 2016    Procedure: BIOPSY BONE MARROW;  Surgeon: Mu Morgan MD;  Location:  GI     C DEXA INTERPRETATION, AXIAL  03     C NONSPECIFIC PROCEDURE           C NONSPECIFIC PROCEDURE      hysterectomy/BSO     C NONSPECIFIC PROCEDURE      myomectomy (fibroids)     CATARACT IOL, RT/LT Right      CATARACT IOL, RT/LT Left      COLONOSCOPY N/A 2018    Procedure: COLONOSCOPY;  colonoscopy;  Surgeon: Meliton Castrejon MD;  Location:  GI     EXCHANGE INTRAOCULAR LENS IMPLANT Right 2015    Procedure: EXCHANGE INTRAOCULAR LENS IMPLANT;  Surgeon: Garrett Dawson MD;  Location:  EC     HC COLONOSCOPY THRU STOMA, DIAGNOSTIC      normal- minimal diverticulosis     PICC INSERTION Left 2017    5fr DL BioFlo PICC, 42cm (2cm external) in the L basilic vein w/ tip in  the  SVC RA junction.     VITRECTOMY PARSPLANA WITH 23 GAUGE SYSTEM Right 2/2/2015    Procedure: VITRECTOMY PARSPLANA WITH 23 GAUGE SYSTEM;  Surgeon: Racheal Loyd MD;  Location: [facility]     Social History   Substance Use Topics     Smoking status: Former Smoker     Quit date: 5/14/1973     Smokeless tobacco: Never Used     Alcohol use No      Comment: occasionally     Current Outpatient Prescriptions   Medication Sig Dispense Refill     Acetaminophen (TYLENOL PO) Take 325 mg by mouth every 6 hours as needed for mild pain or fever       Calcium Carb-Cholecalciferol (CALCIUM + D3) 600-200 MG-UNIT TABS Take 1 tablet by mouth 2 times daily 60 tablet      chlorpheniramine (CHLOR-TRIMETON) 4 MG tablet Take 4 mg by mouth every 6 hours as needed. For head cold        ciprofloxacin (CIPRO) 250 MG tablet Take 1 tablet (250 mg) by mouth 2 times daily 10 tablet 0     COMPRESSION STOCKINGS Wear compression stockings at 20-30 mmHg rating most time during the day to the affected leg (left leg) or both legs. Take them off at night. 2 each 2     cycloSPORINE modified (GENERIC EQUIVALENT) 25 MG capsule Take 3 capsules (75 mg) by mouth 2 times daily or as directed 180 capsule 6     diphenhydrAMINE (BENADRYL ALLERGY) 25 MG tablet Take 25 mg by mouth At Bedtime  56 tablet      eltrombopag (PROMACTA) 50 MG tablet Take 3 tablets (150 mg) by mouth daily Administer on an empty stomach, 1 hour before or 2 hours after a meal. Or as directed 90 tablet 6     loratadine (CLARITIN) 10 MG tablet Take 10 mg by mouth daily as needed Reported on 4/26/2017       ORDER FOR DME Equipment being ordered: knee high compression stockings- 18-20 mm 1 Box 1     pantoprazole (PROTONIX) 20 MG EC tablet Take 1 tablet (20 mg) by mouth daily 30 tablet 11     Allergies   Allergen Reactions     Cats      Dogs      Seasonal Allergies      FAMILY HISTORY NOTED AND REVIEWED    PHYSICAL EXAM:    /72 (Cuff Size: Adult Regular)  Pulse 87  Temp 97  F  (36.1  C) (Oral)  Ht 5' (1.524 m)  Wt 118 lb (53.5 kg)  SpO2 97%  Breastfeeding? No  BMI 23.05 kg/m2    Patient appears non toxic    Results for orders placed or performed in visit on 10/08/18   *UA reflex to Microscopic and Culture ([place] and [facility] (except [place] and [place])   Result Value Ref Range    Color Urine Yellow     Appearance Urine Clear     Glucose Urine Negative NEG^Negative mg/dL    Bilirubin Urine Negative NEG^Negative    Ketones Urine Negative NEG^Negative mg/dL    Specific Gravity Urine 1.015 1.003 - 1.035    Blood Urine Negative NEG^Negative    pH Urine 5.5 5.0 - 7.0 pH    Protein Albumin Urine 30 (A) NEG^Negative mg/dL    Urobilinogen Urine 1.0 0.2 - 1.0 EU/dL    Nitrite Urine Positive (A) NEG^Negative    Leukocyte Esterase Urine Moderate (A) NEG^Negative    Source Midstream Urine    Urine Microscopic   Result Value Ref Range    WBC Urine >100 (A) OTO5^0 - 5 /HPF    RBC Urine O - 2 OTO2^O - 2 /HPF    Squamous Epithelial /LPF Urine Moderate (A) FEW^Few /LPF    Bacteria Urine Many (A) NEG^Negative /HPF     Assessment and Plan:     (R35.0) Urinary frequency  (primary encounter diagnosis)  Comment:   Plan: *UA reflex to Microscopic and Culture ([place]         and [facility] (except [place] and         [place]), Urine Microscopic, Urine Culture         Aerobic Bacterial            (N39.0) Urinary tract infection without hematuria, site unspecified  Comment: hold calcium supplement while on cipro  Plan: ciprofloxacin (CIPRO) 250 MG tablet bid x 5d, Urine         Culture Aerobic Bacterial              Shivani Phelps PA-C

## 2018-10-11 LAB
BACTERIA SPEC CULT: ABNORMAL
BACTERIA SPEC CULT: ABNORMAL
SPECIMEN SOURCE: ABNORMAL

## 2018-11-14 ENCOUNTER — ONCOLOGY VISIT (OUTPATIENT)
Dept: ONCOLOGY | Facility: CLINIC | Age: 73
End: 2018-11-14
Attending: PHYSICIAN ASSISTANT
Payer: COMMERCIAL

## 2018-11-14 ENCOUNTER — APPOINTMENT (OUTPATIENT)
Dept: LAB | Facility: CLINIC | Age: 73
End: 2018-11-14
Attending: INTERNAL MEDICINE
Payer: COMMERCIAL

## 2018-11-14 VITALS
OXYGEN SATURATION: 97 % | HEART RATE: 69 BPM | DIASTOLIC BLOOD PRESSURE: 95 MMHG | TEMPERATURE: 98.1 F | HEIGHT: 60 IN | SYSTOLIC BLOOD PRESSURE: 163 MMHG | WEIGHT: 121.4 LBS | BODY MASS INDEX: 23.84 KG/M2 | RESPIRATION RATE: 16 BRPM

## 2018-11-14 DIAGNOSIS — D61.818 PANCYTOPENIA (H): ICD-10-CM

## 2018-11-14 DIAGNOSIS — D61.3 IDIOPATHIC APLASTIC ANEMIA (H): ICD-10-CM

## 2018-11-14 LAB
ALBUMIN SERPL-MCNC: 3.5 G/DL (ref 3.4–5)
ALP SERPL-CCNC: 78 U/L (ref 40–150)
ALT SERPL W P-5'-P-CCNC: 19 U/L (ref 0–50)
ANION GAP SERPL CALCULATED.3IONS-SCNC: 7 MMOL/L (ref 3–14)
AST SERPL W P-5'-P-CCNC: 16 U/L (ref 0–45)
BASOPHILS # BLD AUTO: 0 10E9/L (ref 0–0.2)
BASOPHILS NFR BLD AUTO: 0 %
BILIRUB SERPL-MCNC: 1.2 MG/DL (ref 0.2–1.3)
BUN SERPL-MCNC: 26 MG/DL (ref 7–30)
CALCIUM SERPL-MCNC: 8.1 MG/DL (ref 8.5–10.1)
CHLORIDE SERPL-SCNC: 110 MMOL/L (ref 94–109)
CO2 SERPL-SCNC: 22 MMOL/L (ref 20–32)
CREAT SERPL-MCNC: 1.15 MG/DL (ref 0.52–1.04)
CYCLOSPORINE BLD LC/MS/MS-MCNC: 72 UG/L (ref 50–400)
DIFFERENTIAL METHOD BLD: ABNORMAL
EOSINOPHIL # BLD AUTO: 0.1 10E9/L (ref 0–0.7)
EOSINOPHIL NFR BLD AUTO: 2.3 %
ERYTHROCYTE [DISTWIDTH] IN BLOOD BY AUTOMATED COUNT: 14.5 % (ref 10–15)
GFR SERPL CREATININE-BSD FRML MDRD: 46 ML/MIN/1.7M2
GLUCOSE SERPL-MCNC: 82 MG/DL (ref 70–99)
HCT VFR BLD AUTO: 26.3 % (ref 35–47)
HGB BLD-MCNC: 8.4 G/DL (ref 11.7–15.7)
IMM GRANULOCYTES # BLD: 0 10E9/L (ref 0–0.4)
IMM GRANULOCYTES NFR BLD: 0.3 %
LYMPHOCYTES # BLD AUTO: 1.4 10E9/L (ref 0.8–5.3)
LYMPHOCYTES NFR BLD AUTO: 39.9 %
MCH RBC QN AUTO: 39.4 PG (ref 26.5–33)
MCHC RBC AUTO-ENTMCNC: 31.9 G/DL (ref 31.5–36.5)
MCV RBC AUTO: 124 FL (ref 78–100)
MONOCYTES # BLD AUTO: 0.2 10E9/L (ref 0–1.3)
MONOCYTES NFR BLD AUTO: 6.6 %
NEUTROPHILS # BLD AUTO: 1.8 10E9/L (ref 1.6–8.3)
NEUTROPHILS NFR BLD AUTO: 50.9 %
NRBC # BLD AUTO: 0 10*3/UL
NRBC BLD AUTO-RTO: 1 /100
PLATELET # BLD AUTO: 67 10E9/L (ref 150–450)
POTASSIUM SERPL-SCNC: 4.3 MMOL/L (ref 3.4–5.3)
PROT SERPL-MCNC: 7.5 G/DL (ref 6.8–8.8)
RBC # BLD AUTO: 2.13 10E12/L (ref 3.8–5.2)
RETICS # AUTO: 50.5 10E9/L (ref 25–95)
RETICS/RBC NFR AUTO: 2.4 % (ref 0.5–2)
SODIUM SERPL-SCNC: 139 MMOL/L (ref 133–144)
TME LAST DOSE: NORMAL H
WBC # BLD AUTO: 3.5 10E9/L (ref 4–11)

## 2018-11-14 PROCEDURE — G0463 HOSPITAL OUTPT CLINIC VISIT: HCPCS | Mod: ZF

## 2018-11-14 PROCEDURE — 80053 COMPREHEN METABOLIC PANEL: CPT | Performed by: INTERNAL MEDICINE

## 2018-11-14 PROCEDURE — 36415 COLL VENOUS BLD VENIPUNCTURE: CPT

## 2018-11-14 PROCEDURE — 85045 AUTOMATED RETICULOCYTE COUNT: CPT | Performed by: INTERNAL MEDICINE

## 2018-11-14 PROCEDURE — 80158 DRUG ASSAY CYCLOSPORINE: CPT | Performed by: INTERNAL MEDICINE

## 2018-11-14 PROCEDURE — 85025 COMPLETE CBC W/AUTO DIFF WBC: CPT | Performed by: INTERNAL MEDICINE

## 2018-11-14 PROCEDURE — 99214 OFFICE O/P EST MOD 30 MIN: CPT | Mod: ZP | Performed by: PHYSICIAN ASSISTANT

## 2018-11-14 ASSESSMENT — PAIN SCALES - GENERAL: PAINLEVEL: NO PAIN (0)

## 2018-11-14 NOTE — PROGRESS NOTES
[facility]  FOLLOW-UP VISIT NOTE  Date of visit: Nov 14, 2018            REASON FOR VISIT: Aplastic anemia, routine 6 week follow up    HPI: Bonny is a 72 year old female who presented in the fall of 2016 with fatigue and shortness of breath. 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  Her counts showed pancytopenia and BMBX was concerning for aplastic anemia.  By December of 2016 she was transfusion dependent with platelets under 10 k and ANC dropped under 500. 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  She has met with Dr Mcdaniel at [facility] and consulted with Dr Rogel at [facility].      After further consultation it was decided to admit on 1/9/17 for ATG/cyclosporine/prednisone therapy.     The following was her inpatient regimen:  Day 1= 1/9/17  ATG 2500 mg (40 mg/kg0 q24 hours D-4)  Cyclosporine 200 mg (3 mg/kg) PO bid  Eltrombopag 50 mg daily  Methylpred 31 mg (0.5 mg/kg) q24 hours D1-4  Prednisone 60 mg (1 mg/kg) daily after completion of IV methylpred to continue for at least 2 weeks      INTERVAL HISTORY: Bonny is here today for her q6w follow up. Bonny seems to be doing really well.  She is walking her dog and active running erands.  She denies any nausea/vomiting or GERD.  She is eating well and actually gained weigh.  She had a colonoscopy without issues and her thyroid biopsy was negative.  Her back pain is really minimal at this point.  This is probably the most functional she has felt in a long time. She is excited for the holidays and feels she has no real concerns today.     No fevers, chest pain and and no bleeding. Daily BM, no black/blood. No  issues. Her dental issues are all completed now (had some implants).  She tolerated her bisphosphonate well.  Shingles vax was very tender in her R arm for weeks, finally better.   ROS: complete review of systems was performed which was negative unless noted  above.    EXAM:  BP (!) 163/95 (BP Location: Left arm, Patient Position: Chair, Cuff Size: Adult Regular)  Pulse 69  Temp 98.1  F (36.7  C) (Oral)  Resp 16  Ht 1.524 m (5')  Wt 55.1 kg (121 lb 6.4 oz)  SpO2 97%  BMI 23.71 kg/m2  Wt Readings from Last 4 Encounters:   11/14/18 55.1 kg (121 lb 6.4 oz)   10/08/18 53.5 kg (118 lb)   10/04/18 53.6 kg (118 lb 3.2 oz)   09/13/18 53.1 kg (117 lb)     General: Patient alert and oriented x3.   EYES: PERRLA, conjunctiva pink  Neck: No palpable cervical, supraclavicular or axillary nodes bilaterally.   Cardiovascular: RRR, no murmurs, gallops or rubs  Respiratory: clear to auscultation bilaterally;  no crackles, rhonchi or wheezing.   Abdomen: positive bowel sounds in all four quadrants, soft, nontender  Neuro: grossly intact.   Mood and affect is stable.       LABS:      7/12/2018 11:07 8/8/2018 11:18 10/4/2018 13:48 11/14/2018 12:33   WBC 2.7 (L) 2.6 (L) 3.6 (L) 3.5 (L)   Hemoglobin 8.5 (L) 9.0 (L) 8.2 (L) 8.4 (L)   Hematocrit 25.4 (L) 27.3 (L) 25.4 (L) 26.3 (L)   Platelet Count 63 (L) 66 (L) 67 (L) 67 (L)   RBC Count 2.16 (L) 2.29 (L) 2.08 (L) 2.13 (L)    (H) 119 (H) 122 (H) 124 (H)   MCH 39.4 (H) 39.3 (H) 39.4 (H) 39.4 (H)   MCHC 33.5 33.0 32.3 31.9   RDW 14.8 14.7 15.3 (H) 14.5   Diff Method Automated Method Automated Method Automated Method Automated Method   % Neutrophils 42.7 43.3 58.8 50.9   % Lymphocytes 42.3 42.5 31.3 39.9   % Monocytes 8.4 6.5 5.9 6.6   % Eosinophils 6.2 7.3 3.4 2.3   % Basophils 0.0 0.0 0.3 0.0   % Immature Granulocytes 0.4 0.4 0.3 0.3   Nucleated RBCs 0 0 1 (H) 1 (H)   Absolute Neutrophil 1.2 (L) 1.1 (L) 2.1 1.8        11/14/2018 12:33   Sodium 139   Potassium 4.3   Chloride 110 (H)   Carbon Dioxide 22   Urea Nitrogen 26   Creatinine 1.15 (H)   GFR Estimate 46 (L)   GFR Estimate If Black 56 (L)   Calcium 8.1 (L)   Anion Gap 7   Albumin 3.5   Protein Total 7.5   Bilirubin Total 1.2   Alkaline Phosphatase 78   ALT 19   AST 16        ASSESSMENT/PLAN: 72 year old female with AA, now has been transfusion INDEPENDENT since April 2017.    HEME- Treatment for AA 1/9-1/13/17 with ATG, methylpred, cyclosporine and eltrombopag. Has been off her prednisone and ppx medications since spring.  Her stamina has improved since I saw her last and counts are now independent from transfusions since April of 2017. Her CSA levels have been subtherapeutic however she developed renal toxicity with higher doses so we are keeping her at the following doses:  -cyclosporine 75 mg BID  -eltrombopag 150 mg daily  -counts  q6 w  -transfuse if hgb <7.5 or symptomatic and platelets <30k (or prior to dental issues)  -q6 weeks for labs/visits    Bonny was doing really well today.  Best she has felt in a long time.  RTC in 6 weeks for labs.     Renal: Creat has improved with the lower dose of CSA.  Continue hydration.      HEME-  -Platelets stable at 67k.  Hgb stable at 8.4.  ANC 1.8  -two prior provoked DVT in 2012 and 2014 with travel. 2012 was associated with PE. Was on warfarin/lovenox in the past. Will keep off ASA until platelets improve more.      ID-  No fever or symptoms of infection.   No ppx medications, CMV was neg.    Completed pentamidine in 2017.    MSK- low back -Low thoracic mid spine pain which has significantly improved.  Has h/o compression fractures and has osteoporosis.  Completed her dental work.    -Prolia in October.  Every 6 months for age related osteoporosis     FEN: bobby improved, weight stable.      GI: has GERD, I cut her Protonix from 40 mg to 20 mg.  If continued stability can cut down again in future.    Health maintenance: sees pcp routinely.  Thyroid biopsy, colonoscopy--WNL.  Due for Shingrix in Feb 2019    Sejal Walls PA-C

## 2018-11-14 NOTE — NURSING NOTE
Chief Complaint   Patient presents with     Blood Draw     labs drawn via venipuncture by RN     BP (!) 163/95 (BP Location: Left arm, Patient Position: Chair, Cuff Size: Adult Regular)  Pulse 69  Temp 98.1  F (36.7  C) (Oral)  Wt 55.1 kg (121 lb 6.4 oz)  SpO2 97%  BMI 23.71 kg/m2    Vitals taken.  Labs collected and sent from left antecubital venipuncture. Pt tolerated well. Pt checked in for next appointment.    Laxmi Thomas RN

## 2018-11-14 NOTE — MR AVS SNAPSHOT
After Visit Summary   11/14/2018    Bonny Raymundo    MRN: [id number]           Patient Information     Date Of Birth          1945        Visit Information        Provider Department      11/14/2018 12:30 PM Celine Walls PA-C [facility]        Today's Diagnoses     Pancytopenia (H)        Idiopathic aplastic anemia (H)           Follow-ups after your visit        Your next 10 appointments already scheduled     Benton 10, 2019  2:30 PM CST   [facility] Lab Draw with  [facility] DRAW   [facility] ([facility])    [address] [place] [address]  [address]  [facility] [address]   [phone]            B[place] 10, 2019  3:00 PM CST   RETURN ONC with Rafita Rogel MD   [facility] Blood and Marrow Transplant ([facility])    [address] [place] [address]  [address]  [facility] [id number] [phone]            Feb 20, 2019 11:00 AM CST   [facility] Lab Draw with  [facility] LAB DRAW   [facility] ([facility])    [address] [place] [address]  [address]  [facility] [id number] [phone]            Feb 20, 2019 11:40 AM CST   (Arrive by 11:25 AM)   Return Visit with Celine Walls PA-C   [facility] ([facility])    [address] [place] [address]  [address]  [facility] [address] [phone]              Who to contact     If you have questions or need follow up information about today's clinic visit or your schedule please contact [facility] directly at [phone].  Normal or non-critical lab and imaging results will be communicated to you by MyChart, letter or phone within 4 business days after the clinic has received the results. If you do not hear from us within 7 days, please contact the clinic through MyChart or phone. If you have a critical or abnormal lab result, we will notify you by phone as soon as  possible.  Submit refill requests through [employer] or call your pharmacy and they will forward the refill request to us. Please allow 3 business days for your refill to be completed.          Additional Information About Your Visit        Care EveryWhere ID     This is your Care EveryWhere ID. This could be used by other organizations to access your [facility] medical records  [id number]        Your Vitals Were     Pulse Temperature Respirations Height Pulse Oximetry BMI (Body Mass Index)    69 98.1  F (36.7  C) (Oral) 16 1.524 m (5') 97% 23.71 kg/m2       Blood Pressure from Last 3 Encounters:   11/14/18 (!) 163/95   10/08/18 114/72   10/04/18 158/53    Weight from Last 3 Encounters:   11/14/18 55.1 kg (121 lb 6.4 oz)   10/08/18 53.5 kg (118 lb)   10/04/18 53.6 kg (118 lb 3.2 oz)              We Performed the Following     CBC with platelets differential     Comprehensive metabolic panel     Cyclosporine     Reticulocyte count        Primary Care Provider Office Phone # Fax #    Shivani Phelps PA-C [phone] [address]  [place] [address]        Equal Access to Services     LANDON NOLASCO : Hadii erica ku hadasho Soomaali, waaxda luqadaha, qaybta kaalmada adeegyada, ambika levine. So [facility] [phone].    ATENCIÓN: Si habla español, tiene a robertson disposición servicios gratuitos de asistencia lingüística. Llame al [phone].    We comply with applicable federal civil rights laws and [place] laws. We do not discriminate on the basis of race, color, national origin, age, disability, sex, sexual orientation, or gender identity.            Thank you!     Thank you for choosing [facility]  for your care. Our goal is always to provide you with excellent care. Hearing back from our patients is one way we can continue to improve our services. Please take a few minutes to complete the written survey that you may receive in the mail after your  visit with us. Thank you!             Your Updated Medication List - Protect others around you: Learn how to safely use, store and throw away your medicines at www.disposemymeds.org.          This list is accurate as of 11/14/18 11:59 PM.  Always use your most recent med list.                   Brand Name Dispense Instructions for use Diagnosis    BENADRYL ALLERGY 25 MG tablet   Generic drug:  diphenhydrAMINE     56 tablet    Take 25 mg by mouth At Bedtime        Calcium + D3 600-200 MG-UNIT Tabs     60 tablet    Take 1 tablet by mouth 2 times daily        COMPRESSION STOCKINGS     2 each    Wear compression stockings at 20-30 mmHg rating most time during the day to the affected leg (left leg) or both legs. Take them off at night.    DVT (deep venous thrombosis), left, Postphlebitic syndrome       cycloSPORINE modified 25 MG capsule    GENERIC EQUIVALENT    180 capsule    Take 3 capsules (75 mg) by mouth 2 times daily or as directed    Aplastic anemia (H), Pancytopenia (H), Idiopathic aplastic anemia (H)       eltrombopag 50 MG tablet    PROMACTA    90 tablet    Take 3 tablets (150 mg) by mouth daily Administer on an empty stomach, 1 hour before or 2 hours after a meal. Or as directed    Idiopathic aplastic anemia (H), Pancytopenia (H)       loratadine 10 MG tablet    CLARITIN     Take 10 mg by mouth daily as needed Reported on 4/26/2017        order for DME     1 Box    Equipment being ordered: knee high compression stockings- 18-20 mm    DVT prophylaxis       pantoprazole 20 MG EC tablet    PROTONIX    30 tablet    Take 1 tablet (20 mg) by mouth daily    Gastroesophageal reflux disease without esophagitis       TYLENOL PO      Take 325 mg by mouth every 6 hours as needed for mild pain or fever

## 2018-11-14 NOTE — NURSING NOTE
Oncology Rooming Note    November 14, 2018 12:45 PM   Bonny Raymundo is a 72 year old female who presents for:    Chief Complaint   Patient presents with     Blood Draw     labs drawn via venipuncture by RN     Oncology Clinic Visit     Return visit related to Aplastic Anemia     Initial Vitals: BP (!) 163/95 (BP Location: Left arm, Patient Position: Chair, Cuff Size: Adult Regular)  Pulse 69  Temp 98.1  F (36.7  C) (Oral)  Resp 16  Ht 1.524 m (5')  Wt 55.1 kg (121 lb 6.4 oz)  SpO2 97%  BMI 23.71 kg/m2 Estimated body mass index is 23.71 kg/(m^2) as calculated from the following:    Height as of this encounter: 1.524 m (5').    Weight as of this encounter: 55.1 kg (121 lb 6.4 oz). Body surface area is 1.53 meters squared.  No Pain (0) Comment: Data Unavailable   No LMP recorded. Patient has had a hysterectomy.  Allergies reviewed: Yes  Medications reviewed: Yes    Medications: Medication refills not needed today.  Pharmacy name entered into [facility]:    [place] [facility], [place] - [address]  [facility], [place] - [address]  [facility], [address]  WRITTEN PRESCRIPTION REQUESTED  [id number], [place] - [address]    Clinical concerns: No new concerns. Provider was notified.    10 minutes for nursing intake (face to face time)     Sydnee Patel LPN

## 2018-12-07 ENCOUNTER — TELEPHONE (OUTPATIENT)
Dept: ONCOLOGY | Facility: CLINIC | Age: 73
End: 2018-12-07

## 2019-01-10 ENCOUNTER — OFFICE VISIT (OUTPATIENT)
Dept: TRANSPLANT | Facility: CLINIC | Age: 74
End: 2019-01-10
Attending: INTERNAL MEDICINE
Payer: COMMERCIAL

## 2019-01-10 ENCOUNTER — APPOINTMENT (OUTPATIENT)
Dept: LAB | Facility: CLINIC | Age: 74
End: 2019-01-10
Attending: INTERNAL MEDICINE
Payer: COMMERCIAL

## 2019-01-10 VITALS
OXYGEN SATURATION: 98 % | SYSTOLIC BLOOD PRESSURE: 131 MMHG | TEMPERATURE: 98 F | BODY MASS INDEX: 23.63 KG/M2 | DIASTOLIC BLOOD PRESSURE: 81 MMHG | RESPIRATION RATE: 18 BRPM | HEART RATE: 68 BPM | WEIGHT: 121 LBS

## 2019-01-10 DIAGNOSIS — D61.818 PANCYTOPENIA (H): ICD-10-CM

## 2019-01-10 DIAGNOSIS — D61.3 IDIOPATHIC APLASTIC ANEMIA (H): ICD-10-CM

## 2019-01-10 LAB
ABO + RH BLD: NORMAL
ABO + RH BLD: NORMAL
ALBUMIN SERPL-MCNC: 3.2 G/DL (ref 3.4–5)
ALP SERPL-CCNC: 93 U/L (ref 40–150)
ALT SERPL W P-5'-P-CCNC: 14 U/L (ref 0–50)
ANION GAP SERPL CALCULATED.3IONS-SCNC: 4 MMOL/L (ref 3–14)
AST SERPL W P-5'-P-CCNC: 12 U/L (ref 0–45)
BASOPHILS # BLD AUTO: 0 10E9/L (ref 0–0.2)
BASOPHILS NFR BLD AUTO: 0.3 %
BILIRUB SERPL-MCNC: 0.9 MG/DL (ref 0.2–1.3)
BLD GP AB SCN SERPL QL: NORMAL
BLOOD BANK CMNT PATIENT-IMP: NORMAL
BUN SERPL-MCNC: 28 MG/DL (ref 7–30)
CALCIUM SERPL-MCNC: 8.4 MG/DL (ref 8.5–10.1)
CHLORIDE SERPL-SCNC: 110 MMOL/L (ref 94–109)
CO2 SERPL-SCNC: 25 MMOL/L (ref 20–32)
CREAT SERPL-MCNC: 1.14 MG/DL (ref 0.52–1.04)
DIFFERENTIAL METHOD BLD: ABNORMAL
EOSINOPHIL # BLD AUTO: 0.5 10E9/L (ref 0–0.7)
EOSINOPHIL NFR BLD AUTO: 16.2 %
ERYTHROCYTE [DISTWIDTH] IN BLOOD BY AUTOMATED COUNT: 14 % (ref 10–15)
GFR SERPL CREATININE-BSD FRML MDRD: 47 ML/MIN/{1.73_M2}
GLUCOSE SERPL-MCNC: 90 MG/DL (ref 70–99)
HCT VFR BLD AUTO: 28.7 % (ref 35–47)
HGB BLD-MCNC: 9.6 G/DL (ref 11.7–15.7)
IMM GRANULOCYTES # BLD: 0 10E9/L (ref 0–0.4)
IMM GRANULOCYTES NFR BLD: 0 %
LYMPHOCYTES # BLD AUTO: 1.1 10E9/L (ref 0.8–5.3)
LYMPHOCYTES NFR BLD AUTO: 34 %
MCH RBC QN AUTO: 39.8 PG (ref 26.5–33)
MCHC RBC AUTO-ENTMCNC: 33.4 G/DL (ref 31.5–36.5)
MCV RBC AUTO: 119 FL (ref 78–100)
MONOCYTES # BLD AUTO: 0.3 10E9/L (ref 0–1.3)
MONOCYTES NFR BLD AUTO: 8.3 %
NEUTROPHILS # BLD AUTO: 1.3 10E9/L (ref 1.6–8.3)
NEUTROPHILS NFR BLD AUTO: 41.2 %
NRBC # BLD AUTO: 0 10*3/UL
NRBC BLD AUTO-RTO: 0 /100
PLATELET # BLD AUTO: 51 10E9/L (ref 150–450)
POTASSIUM SERPL-SCNC: 4.5 MMOL/L (ref 3.4–5.3)
PROT SERPL-MCNC: 7.5 G/DL (ref 6.8–8.8)
RBC # BLD AUTO: 2.41 10E12/L (ref 3.8–5.2)
SODIUM SERPL-SCNC: 139 MMOL/L (ref 133–144)
SPECIMEN EXP DATE BLD: NORMAL
WBC # BLD AUTO: 3.2 10E9/L (ref 4–11)

## 2019-01-10 PROCEDURE — 86850 RBC ANTIBODY SCREEN: CPT | Performed by: INTERNAL MEDICINE

## 2019-01-10 PROCEDURE — 80158 DRUG ASSAY CYCLOSPORINE: CPT | Performed by: INTERNAL MEDICINE

## 2019-01-10 PROCEDURE — G0463 HOSPITAL OUTPT CLINIC VISIT: HCPCS | Mod: ZF

## 2019-01-10 PROCEDURE — 80053 COMPREHEN METABOLIC PANEL: CPT | Performed by: PHYSICIAN ASSISTANT

## 2019-01-10 PROCEDURE — 36415 COLL VENOUS BLD VENIPUNCTURE: CPT

## 2019-01-10 PROCEDURE — 85025 COMPLETE CBC W/AUTO DIFF WBC: CPT | Performed by: PHYSICIAN ASSISTANT

## 2019-01-10 PROCEDURE — 86900 BLOOD TYPING SEROLOGIC ABO: CPT | Performed by: INTERNAL MEDICINE

## 2019-01-10 PROCEDURE — 86901 BLOOD TYPING SEROLOGIC RH(D): CPT | Performed by: INTERNAL MEDICINE

## 2019-01-10 ASSESSMENT — PAIN SCALES - GENERAL: PAINLEVEL: NO PAIN (0)

## 2019-01-10 NOTE — PROGRESS NOTES
/81 (BP Location: Right arm, Patient Position: Sitting, Cuff Size: Adult Regular)   Pulse 68   Temp 98  F (36.7  C) (Oral)   Resp 18   Wt 54.9 kg (121 lb)   SpO2 98%   BMI 23.63 kg/m     Wt Readings from Last 4 Encounters:   01/10/19 54.9 kg (121 lb)   11/14/18 55.1 kg (121 lb 6.4 oz)   10/08/18 53.5 kg (118 lb)   10/04/18 53.6 kg (118 lb 3.2 oz)   Bonny returns to follow-up her aplastic anemia.  She continues on cyclosporine 75 mg twice daily and nd eltrombopag 150 mg daily without problems.  She has occasional heartburn but it is episodic.  She has not been taking her calcium and vitamin D for a while to see if that makes a difference.    She gets a runny nose (without fever chills cough or respiratory distress) after eating and and she says it occurs after almost anything she eats.  We discussed whether taking her loratadine on a regular basis might reduce that symptom.  She has not had bleeding bruising rash cough other GI or  symptoms.  She has some aching in her right hand that she thinks is a bit of arthritis but has no neuropathic symptoms tremor or discoordination.  The rest of her review of systems is unrevealing.    Her exam shows her vital signs are acceptable and her weight is stable over the last 3 months.  She has no bruises or petechiae.  She has no lower extremity edema.  There is no bone tenderness in any site.  Oropharynx is clear without mucosal lesions.  Her lungs are clear without rales or wheezing.  Her heart tones are regular.  She has no gallop rhythm or murmur.  Her abdomen is soft and nontender without masses or hepatosplenomegaly.  She has minimal to no resting tremor.    Laboratory testing showed stable mild cytopenias but no notable trend in her counts over the last number of months .  Her renal function and liver function is acceptable.  CSA level is low at 58 but we'll make no changes as we've reduced her CSA dosing to protect against excess renal toxicity which is now  better controlled.    We will make no change in her medications and she will continue to get seen at roughly 6-week intervals,  in late February and I will see her again in April.  She knows to call if additional issues arise.    Rafita Rogel MD    Professor of medicine    Results for LISSETH PARIKH (MRN [id number]) as of 1/11/2019 11:39   Ref. Range 11/14/2018 12:33 1/10/2019 15:36   Sodium Latest Ref Range: 133 - 144 mmol/L 139 139   Potassium Latest Ref Range: 3.4 - 5.3 mmol/L 4.3 4.5   Chloride Latest Ref Range: 94 - 109 mmol/L 110 (H) 110 (H)   Carbon Dioxide Latest Ref Range: 20 - 32 mmol/L 22 25   Urea Nitrogen Latest Ref Range: 7 - 30 mg/dL 26 28   Creatinine Latest Ref Range: 0.52 - 1.04 mg/dL 1.15 (H) 1.14 (H)   GFR Estimate Latest Ref Range: >60 mL/min/1.73_m2 46 (L) 47 (L)   GFR Estimate If Black Latest Ref Range: >60 mL/min/1.73_m2 56 (L) 54 (L)   Calcium Latest Ref Range: 8.5 - 10.1 mg/dL 8.1 (L) 8.4 (L)   Anion Gap Latest Ref Range: 3 - 14 mmol/L 7 4   Albumin Latest Ref Range: 3.4 - 5.0 g/dL 3.5 3.2 (L)   Protein Total Latest Ref Range: 6.8 - 8.8 g/dL 7.5 7.5   Bilirubin Total Latest Ref Range: 0.2 - 1.3 mg/dL 1.2 0.9   Alkaline Phosphatase Latest Ref Range: 40 - 150 U/L 78 93   ALT Latest Ref Range: 0 - 50 U/L 19 14   AST Latest Ref Range: 0 - 45 U/L 16 12   Glucose Latest Ref Range: 70 - 99 mg/dL 82 90   WBC Latest Ref Range: 4.0 - 11.0 10e9/L 3.5 (L) 3.2 (L)   Hemoglobin Latest Ref Range: 11.7 - 15.7 g/dL 8.4 (L) 9.6 (L)   Hematocrit Latest Ref Range: 35.0 - 47.0 % 26.3 (L) 28.7 (L)   Platelet Count Latest Ref Range: 150 - 450 10e9/L 67 (L) 51 (L)   RBC Count Latest Ref Range: 3.8 - 5.2 10e12/L 2.13 (L) 2.41 (L)   MCV Latest Ref Range: 78 - 100 fl 124 (H) 119 (H)   MCH Latest Ref Range: 26.5 - 33.0 pg 39.4 (H) 39.8 (H)   MCHC Latest Ref Range: 31.5 - 36.5 g/dL 31.9 33.4   RDW Latest Ref Range: 10.0 - 15.0 % 14.5 14.0   Diff Method Unknown Automated Method Automated Method   % Neutrophils  Latest Units: % 50.9 41.2   % Lymphocytes Latest Units: % 39.9 34.0   % Monocytes Latest Units: % 6.6 8.3   % Eosinophils Latest Units: % 2.3 16.2   % Basophils Latest Units: % 0.0 0.3   % Immature Granulocytes Latest Units: % 0.3 0.0   Nucleated RBCs Latest Ref Range: 0 /100 1 (H) 0   Absolute Neutrophil Latest Ref Range: 1.6 - 8.3 10e9/L 1.8 1.3 (L)   Absolute Lymphocytes Latest Ref Range: 0.8 - 5.3 10e9/L 1.4 1.1   Absolute Monocytes Latest Ref Range: 0.0 - 1.3 10e9/L 0.2 0.3   Absolute Eosinophils Latest Ref Range: 0.0 - 0.7 10e9/L 0.1 0.5   Absolute Basophils Latest Ref Range: 0.0 - 0.2 10e9/L 0.0 0.0   Abs Immature Granulocytes Latest Ref Range: 0 - 0.4 10e9/L 0.0 0.0   Absolute Nucleated RBC Unknown 0.0 0.0   % Retic Latest Ref Range: 0.5 - 2.0 % 2.4 (H)    Absolute Retic Latest Ref Range: 25 - 95 10e9/L 50.5    ABO Unknown  A   RH(D) Unknown  Pos   Antibody Screen Unknown  Neg   Test Valid Only At Latest Units:      [facility]..   Specimen Expires Unknown  01/13/2019   Cyclosporine Last Dose Unknown 11/13/2018 2230    Cyclosporine Level Latest Ref Range: 50 - 400 ug/L 72

## 2019-01-10 NOTE — NURSING NOTE
Chief Complaint   Patient presents with     Blood Draw     Labs drawn via  by RN in lab. VS taken.      Aryan Geller RN

## 2019-01-10 NOTE — LETTER
1/10/2019      RE: Bonny Raymundo  [address]  [facility] [address]       /81 (BP Location: Right arm, Patient Position: Sitting, Cuff Size: Adult Regular)   Pulse 68   Temp 98  F (36.7  C) (Oral)   Resp 18   Wt 54.9 kg (121 lb)   SpO2 98%   BMI 23.63 kg/m      Wt Readings from Last 4 Encounters:   01/10/19 54.9 kg (121 lb)   11/14/18 55.1 kg (121 lb 6.4 oz)   10/08/18 53.5 kg (118 lb)   10/04/18 53.6 kg (118 lb 3.2 oz)   Bonny returns to follow-up her aplastic anemia.  She continues on cyclosporine 75 mg twice daily and nd eltrombopag 150 mg daily without problems.  She has occasional heartburn but it is episodic.  She has not been taking her calcium and vitamin D for a while to see if that makes a difference.    She gets a runny nose (without fever chills cough or respiratory distress) after eating and and she says it occurs after almost anything she eats.  We discussed whether taking her loratadine on a regular basis might reduce that symptom.  She has not had bleeding bruising rash cough other GI or  symptoms.  She has some aching in her right hand that she thinks is a bit of arthritis but has no neuropathic symptoms tremor or discoordination.  The rest of her review of systems is unrevealing.    Her exam shows her vital signs are acceptable and her weight is stable over the last 3 months.  She has no bruises or petechiae.  She has no lower extremity edema.  There is no bone tenderness in any site.  Oropharynx is clear without mucosal lesions.  Her lungs are clear without rales or wheezing.  Her heart tones are regular.  She has no gallop rhythm or murmur.  Her abdomen is soft and nontender without masses or hepatosplenomegaly.  She has minimal to no resting tremor.    Laboratory testing showed stable mild cytopenias but no notable trend in her counts over the last number of months .  Her renal function and liver function is acceptable.  CSA level is low at 58 but we'll make no changes  as we've reduced her CSA dosing to protect against excess renal toxicity which is now better controlled.    We will make no change in her medications and she will continue to get seen at roughly 6-week intervals,  in late February and I will see her again in April.  She knows to call if additional issues arise.    Rafita Rogel MD    Professor of medicine    Results for LISESTH PARIKH (MRN [id number]) as of 1/11/2019 11:39   Ref. Range 11/14/2018 12:33 1/10/2019 15:36   Sodium Latest Ref Range: 133 - 144 mmol/L 139 139   Potassium Latest Ref Range: 3.4 - 5.3 mmol/L 4.3 4.5   Chloride Latest Ref Range: 94 - 109 mmol/L 110 (H) 110 (H)   Carbon Dioxide Latest Ref Range: 20 - 32 mmol/L 22 25   Urea Nitrogen Latest Ref Range: 7 - 30 mg/dL 26 28   Creatinine Latest Ref Range: 0.52 - 1.04 mg/dL 1.15 (H) 1.14 (H)   GFR Estimate Latest Ref Range: >60 mL/min/1.73_m2 46 (L) 47 (L)   GFR Estimate If Black Latest Ref Range: >60 mL/min/1.73_m2 56 (L) 54 (L)   Calcium Latest Ref Range: 8.5 - 10.1 mg/dL 8.1 (L) 8.4 (L)   Anion Gap Latest Ref Range: 3 - 14 mmol/L 7 4   Albumin Latest Ref Range: 3.4 - 5.0 g/dL 3.5 3.2 (L)   Protein Total Latest Ref Range: 6.8 - 8.8 g/dL 7.5 7.5   Bilirubin Total Latest Ref Range: 0.2 - 1.3 mg/dL 1.2 0.9   Alkaline Phosphatase Latest Ref Range: 40 - 150 U/L 78 93   ALT Latest Ref Range: 0 - 50 U/L 19 14   AST Latest Ref Range: 0 - 45 U/L 16 12   Glucose Latest Ref Range: 70 - 99 mg/dL 82 90   WBC Latest Ref Range: 4.0 - 11.0 10e9/L 3.5 (L) 3.2 (L)   Hemoglobin Latest Ref Range: 11.7 - 15.7 g/dL 8.4 (L) 9.6 (L)   Hematocrit Latest Ref Range: 35.0 - 47.0 % 26.3 (L) 28.7 (L)   Platelet Count Latest Ref Range: 150 - 450 10e9/L 67 (L) 51 (L)   RBC Count Latest Ref Range: 3.8 - 5.2 10e12/L 2.13 (L) 2.41 (L)   MCV Latest Ref Range: 78 - 100 fl 124 (H) 119 (H)   MCH Latest Ref Range: 26.5 - 33.0 pg 39.4 (H) 39.8 (H)   MCHC Latest Ref Range: 31.5 - 36.5 g/dL 31.9 33.4   RDW Latest Ref Range: 10.0 - 15.0 %  14.5 14.0   Diff Method Unknown Automated Method Automated Method   % Neutrophils Latest Units: % 50.9 41.2   % Lymphocytes Latest Units: % 39.9 34.0   % Monocytes Latest Units: % 6.6 8.3   % Eosinophils Latest Units: % 2.3 16.2   % Basophils Latest Units: % 0.0 0.3   % Immature Granulocytes Latest Units: % 0.3 0.0   Nucleated RBCs Latest Ref Range: 0 /100 1 (H) 0   Absolute Neutrophil Latest Ref Range: 1.6 - 8.3 10e9/L 1.8 1.3 (L)   Absolute Lymphocytes Latest Ref Range: 0.8 - 5.3 10e9/L 1.4 1.1   Absolute Monocytes Latest Ref Range: 0.0 - 1.3 10e9/L 0.2 0.3   Absolute Eosinophils Latest Ref Range: 0.0 - 0.7 10e9/L 0.1 0.5   Absolute Basophils Latest Ref Range: 0.0 - 0.2 10e9/L 0.0 0.0   Abs Immature Granulocytes Latest Ref Range: 0 - 0.4 10e9/L 0.0 0.0   Absolute Nucleated RBC Unknown 0.0 0.0   % Retic Latest Ref Range: 0.5 - 2.0 % 2.4 (H)    Absolute Retic Latest Ref Range: 25 - 95 10e9/L 50.5    ABO Unknown  A   RH(D) Unknown  Pos   Antibody Screen Unknown  Neg   Test Valid Only At Latest Units:      [facility].   Specimen Expires Unknown  01/13/2019   Cyclosporine Last Dose Unknown 11/13/2018 2230    Cyclosporine Level Latest Ref Range: 50 - 400 ug/L 72        Rafita Rogel MD

## 2019-01-10 NOTE — NURSING NOTE
Oncology Rooming Note    January 10, 2019 3:58 PM   Bonny Raymundo is a 73 year old female who presents for:    Chief Complaint   Patient presents with     Blood Draw     Labs drawn via  by RN in lab. VS taken.      Oncology Clinic Visit     RTN- Aplastic Anemia     Initial Vitals: /81 (BP Location: Right arm, Patient Position: Sitting, Cuff Size: Adult Regular)   Pulse 68   Temp 98  F (36.7  C) (Oral)   Resp 18   Wt 54.9 kg (121 lb)   SpO2 98%   BMI 23.63 kg/m   Estimated body mass index is 23.63 kg/m  as calculated from the following:    Height as of 11/14/18: 1.524 m (5').    Weight as of this encounter: 54.9 kg (121 lb). Body surface area is 1.52 meters squared.  No Pain (0) Comment: Data Unavailable   No LMP recorded. Patient has had a hysterectomy.  Allergies reviewed: Yes  Medications reviewed: Yes    Medications: Medication refills not needed today.  Pharmacy name entered into [facility]:    [place] [facility], [place] - [address]  [place] [facility], [place] - [address]  [facility], [address]  WRITTEN PRESCRIPTION REQUESTED  [id number] [place], [place] - [address]    Clinical concerns: none     7 minutes for nursing intake (face to face time)     Hoa oGnzalez, [facility]

## 2019-01-11 LAB
CYCLOSPORINE BLD LC/MS/MS-MCNC: 58 UG/L (ref 50–400)
TME LAST DOSE: NORMAL H

## 2019-02-04 ENCOUNTER — TELEPHONE (OUTPATIENT)
Dept: ONCOLOGY | Facility: CLINIC | Age: 74
End: 2019-02-04

## 2019-02-04 NOTE — TELEPHONE ENCOUNTER
Free Drug Application Approved  Effective Dates 2/1/2019-12/31/2019   Patient Notified    Additional Information   Zainab Morse  Oncology Clinic Liaison  [address], [address]  [place] [address]  Ph: [phone]/Fax: [phone]

## 2019-02-22 ENCOUNTER — TELEPHONE (OUTPATIENT)
Dept: ONCOLOGY | Facility: CLINIC | Age: 74
End: 2019-02-22

## 2019-02-22 ENCOUNTER — HOSPITAL ENCOUNTER (OUTPATIENT)
Facility: CLINIC | Age: 74
Setting detail: SPECIMEN
Discharge: HOME | End: 2019-02-22
Attending: INTERNAL MEDICINE | Admitting: INTERNAL MEDICINE
Payer: COMMERCIAL

## 2019-02-22 ENCOUNTER — INFUSION THERAPY VISIT (OUTPATIENT)
Dept: INFUSION THERAPY | Facility: CLINIC | Age: 74
End: 2019-02-22
Attending: INTERNAL MEDICINE
Payer: COMMERCIAL

## 2019-02-22 DIAGNOSIS — D61.818 PANCYTOPENIA (H): ICD-10-CM

## 2019-02-22 DIAGNOSIS — D61.3 IDIOPATHIC APLASTIC ANEMIA (H): ICD-10-CM

## 2019-02-22 LAB
ABO + RH BLD: NORMAL
ABO + RH BLD: NORMAL
ALBUMIN SERPL-MCNC: 3.2 G/DL (ref 3.4–5)
ALP SERPL-CCNC: 68 U/L (ref 40–150)
ALT SERPL W P-5'-P-CCNC: 18 U/L (ref 0–50)
ANION GAP SERPL CALCULATED.3IONS-SCNC: 6 MMOL/L (ref 3–14)
AST SERPL W P-5'-P-CCNC: 18 U/L (ref 0–45)
BASOPHILS # BLD AUTO: 0 10E9/L (ref 0–0.2)
BASOPHILS NFR BLD AUTO: 0 %
BILIRUB SERPL-MCNC: 1.2 MG/DL (ref 0.2–1.3)
BLD GP AB SCN SERPL QL: NORMAL
BLOOD BANK CMNT PATIENT-IMP: NORMAL
BUN SERPL-MCNC: 38 MG/DL (ref 7–30)
CALCIUM SERPL-MCNC: 8.7 MG/DL (ref 8.5–10.1)
CHLORIDE SERPL-SCNC: 113 MMOL/L (ref 94–109)
CO2 SERPL-SCNC: 23 MMOL/L (ref 20–32)
CREAT SERPL-MCNC: 1.27 MG/DL (ref 0.52–1.04)
CYCLOSPORINE BLD LC/MS/MS-MCNC: 80 UG/L (ref 50–400)
DIFFERENTIAL METHOD BLD: ABNORMAL
EOSINOPHIL # BLD AUTO: 0.2 10E9/L (ref 0–0.7)
EOSINOPHIL NFR BLD AUTO: 4.3 %
ERYTHROCYTE [DISTWIDTH] IN BLOOD BY AUTOMATED COUNT: 15.4 % (ref 10–15)
GFR SERPL CREATININE-BSD FRML MDRD: 41 ML/MIN/{1.73_M2}
GLUCOSE SERPL-MCNC: 99 MG/DL (ref 70–99)
HCT VFR BLD AUTO: 27.4 % (ref 35–47)
HGB BLD-MCNC: 9.1 G/DL (ref 11.7–15.7)
IMM GRANULOCYTES # BLD: 0 10E9/L (ref 0–0.4)
IMM GRANULOCYTES NFR BLD: 0 %
LYMPHOCYTES # BLD AUTO: 1.4 10E9/L (ref 0.8–5.3)
LYMPHOCYTES NFR BLD AUTO: 40.5 %
MCH RBC QN AUTO: 38.6 PG (ref 26.5–33)
MCHC RBC AUTO-ENTMCNC: 33.2 G/DL (ref 31.5–36.5)
MCV RBC AUTO: 116 FL (ref 78–100)
MONOCYTES # BLD AUTO: 0.3 10E9/L (ref 0–1.3)
MONOCYTES NFR BLD AUTO: 7.4 %
NEUTROPHILS # BLD AUTO: 1.7 10E9/L (ref 1.6–8.3)
NEUTROPHILS NFR BLD AUTO: 47.8 %
NRBC # BLD AUTO: 0 10*3/UL
NRBC BLD AUTO-RTO: 1 /100
PLATELET # BLD AUTO: 64 10E9/L (ref 150–450)
POTASSIUM SERPL-SCNC: 4.2 MMOL/L (ref 3.4–5.3)
PROT SERPL-MCNC: 7.6 G/DL (ref 6.8–8.8)
RBC # BLD AUTO: 2.36 10E12/L (ref 3.8–5.2)
RETICS # AUTO: 52.4 10E9/L (ref 25–95)
RETICS/RBC NFR AUTO: 2.2 % (ref 0.5–2)
SODIUM SERPL-SCNC: 142 MMOL/L (ref 133–144)
SPECIMEN EXP DATE BLD: NORMAL
WBC # BLD AUTO: 3.5 10E9/L (ref 4–11)

## 2019-02-22 PROCEDURE — 80158 DRUG ASSAY CYCLOSPORINE: CPT | Performed by: INTERNAL MEDICINE

## 2019-02-22 PROCEDURE — 85025 COMPLETE CBC W/AUTO DIFF WBC: CPT | Performed by: INTERNAL MEDICINE

## 2019-02-22 PROCEDURE — 86901 BLOOD TYPING SEROLOGIC RH(D): CPT | Performed by: INTERNAL MEDICINE

## 2019-02-22 PROCEDURE — 86900 BLOOD TYPING SEROLOGIC ABO: CPT | Performed by: INTERNAL MEDICINE

## 2019-02-22 PROCEDURE — 85045 AUTOMATED RETICULOCYTE COUNT: CPT | Performed by: INTERNAL MEDICINE

## 2019-02-22 PROCEDURE — 36415 COLL VENOUS BLD VENIPUNCTURE: CPT

## 2019-02-22 PROCEDURE — 80053 COMPREHEN METABOLIC PANEL: CPT | Performed by: INTERNAL MEDICINE

## 2019-02-22 PROCEDURE — 86850 RBC ANTIBODY SCREEN: CPT | Performed by: INTERNAL MEDICINE

## 2019-02-22 NOTE — PROGRESS NOTES
Medical Assistant Note:  Bonny Raymundo presents today for blood draw.    Patient seen by provider today: No.   present during visit today: Not Applicable.    Concerns: No Concerns.    Procedure:  Lab draw site: LAC, Needle type: BF, Gauge: 23g with gauze and bandaid.    Post Assessment:  Labs drawn without difficulty: Yes.    Discharge Plan:  Departure Mode: Ambulatory.    Face to Face Time: 5.    Anahi Cesar, CMA

## 2019-02-22 NOTE — TELEPHONE ENCOUNTER
Patient did show for [facility] labs but thought appointment was at  1:00pm.

## 2019-02-22 NOTE — TELEPHONE ENCOUNTER
Patient missed peripheral labs for Dr. Rogel at the [facility] on 2/22/19.  Called patient, but had to leave a message to call clinic.

## 2019-03-20 DIAGNOSIS — D61.3 IDIOPATHIC APLASTIC ANEMIA (H): ICD-10-CM

## 2019-03-20 DIAGNOSIS — D61.9 APLASTIC ANEMIA (H): ICD-10-CM

## 2019-03-20 DIAGNOSIS — D61.818 PANCYTOPENIA (H): ICD-10-CM

## 2019-03-20 RX ORDER — CYCLOSPORINE 25 MG/1
75 CAPSULE, LIQUID FILLED ORAL 2 TIMES DAILY
Qty: 180 CAPSULE | Refills: 6 | Status: SHIPPED | OUTPATIENT
Start: 2019-03-20 | End: 2019-11-02

## 2019-03-20 NOTE — TELEPHONE ENCOUNTER
"Per last visit note 1/10/19: \"She continues on cyclosporine 75 mg twice daily and nd eltrombopag 150 mg daily without problems.\"    Routed to Sejal Walls for signature  "

## 2019-04-04 ENCOUNTER — APPOINTMENT (OUTPATIENT)
Dept: LAB | Facility: CLINIC | Age: 74
End: 2019-04-04
Attending: INTERNAL MEDICINE
Payer: COMMERCIAL

## 2019-04-04 ENCOUNTER — OFFICE VISIT (OUTPATIENT)
Dept: TRANSPLANT | Facility: CLINIC | Age: 74
End: 2019-04-04
Attending: INTERNAL MEDICINE
Payer: COMMERCIAL

## 2019-04-04 VITALS
DIASTOLIC BLOOD PRESSURE: 84 MMHG | RESPIRATION RATE: 16 BRPM | TEMPERATURE: 97.4 F | OXYGEN SATURATION: 95 % | WEIGHT: 128.1 LBS | HEART RATE: 87 BPM | SYSTOLIC BLOOD PRESSURE: 142 MMHG | BODY MASS INDEX: 25.15 KG/M2 | HEIGHT: 60 IN

## 2019-04-04 DIAGNOSIS — D61.3 IDIOPATHIC APLASTIC ANEMIA (H): Primary | ICD-10-CM

## 2019-04-04 DIAGNOSIS — D61.818 PANCYTOPENIA (H): ICD-10-CM

## 2019-04-04 LAB
ABO + RH BLD: NORMAL
ABO + RH BLD: NORMAL
ALBUMIN SERPL-MCNC: 3.5 G/DL (ref 3.4–5)
ALP SERPL-CCNC: 62 U/L (ref 40–150)
ALT SERPL W P-5'-P-CCNC: 15 U/L (ref 0–50)
ANION GAP SERPL CALCULATED.3IONS-SCNC: 6 MMOL/L (ref 3–14)
AST SERPL W P-5'-P-CCNC: 12 U/L (ref 0–45)
BASOPHILS # BLD AUTO: 0 10E9/L (ref 0–0.2)
BASOPHILS NFR BLD AUTO: 0.3 %
BILIRUB SERPL-MCNC: 1 MG/DL (ref 0.2–1.3)
BLD GP AB SCN SERPL QL: NORMAL
BLOOD BANK CMNT PATIENT-IMP: NORMAL
BUN SERPL-MCNC: 40 MG/DL (ref 7–30)
CALCIUM SERPL-MCNC: 8.8 MG/DL (ref 8.5–10.1)
CHLORIDE SERPL-SCNC: 115 MMOL/L (ref 94–109)
CO2 SERPL-SCNC: 20 MMOL/L (ref 20–32)
CREAT SERPL-MCNC: 1.27 MG/DL (ref 0.52–1.04)
DIFFERENTIAL METHOD BLD: ABNORMAL
EOSINOPHIL # BLD AUTO: 0.1 10E9/L (ref 0–0.7)
EOSINOPHIL NFR BLD AUTO: 3.4 %
ERYTHROCYTE [DISTWIDTH] IN BLOOD BY AUTOMATED COUNT: 15.6 % (ref 10–15)
GFR SERPL CREATININE-BSD FRML MDRD: 41 ML/MIN/{1.73_M2}
GLUCOSE SERPL-MCNC: 88 MG/DL (ref 70–99)
HCT VFR BLD AUTO: 28.4 % (ref 35–47)
HGB BLD-MCNC: 8.9 G/DL (ref 11.7–15.7)
IMM GRANULOCYTES # BLD: 0 10E9/L (ref 0–0.4)
IMM GRANULOCYTES NFR BLD: 0.3 %
LYMPHOCYTES # BLD AUTO: 1.4 10E9/L (ref 0.8–5.3)
LYMPHOCYTES NFR BLD AUTO: 38.8 %
MCH RBC QN AUTO: 39 PG (ref 26.5–33)
MCHC RBC AUTO-ENTMCNC: 31.3 G/DL (ref 31.5–36.5)
MCV RBC AUTO: 125 FL (ref 78–100)
MONOCYTES # BLD AUTO: 0.3 10E9/L (ref 0–1.3)
MONOCYTES NFR BLD AUTO: 8.9 %
NEUTROPHILS # BLD AUTO: 1.7 10E9/L (ref 1.6–8.3)
NEUTROPHILS NFR BLD AUTO: 48.3 %
NRBC # BLD AUTO: 0 10*3/UL
NRBC BLD AUTO-RTO: 1 /100
PLATELET # BLD AUTO: 74 10E9/L (ref 150–450)
POTASSIUM SERPL-SCNC: 4.5 MMOL/L (ref 3.4–5.3)
PROT SERPL-MCNC: 7.5 G/DL (ref 6.8–8.8)
RBC # BLD AUTO: 2.28 10E12/L (ref 3.8–5.2)
SODIUM SERPL-SCNC: 140 MMOL/L (ref 133–144)
SPECIMEN EXP DATE BLD: NORMAL
WBC # BLD AUTO: 3.5 10E9/L (ref 4–11)

## 2019-04-04 PROCEDURE — 80053 COMPREHEN METABOLIC PANEL: CPT | Performed by: PHYSICIAN ASSISTANT

## 2019-04-04 PROCEDURE — 36415 COLL VENOUS BLD VENIPUNCTURE: CPT

## 2019-04-04 PROCEDURE — 86850 RBC ANTIBODY SCREEN: CPT | Performed by: INTERNAL MEDICINE

## 2019-04-04 PROCEDURE — G0463 HOSPITAL OUTPT CLINIC VISIT: HCPCS | Mod: ZF

## 2019-04-04 PROCEDURE — 80158 DRUG ASSAY CYCLOSPORINE: CPT | Performed by: INTERNAL MEDICINE

## 2019-04-04 PROCEDURE — 86901 BLOOD TYPING SEROLOGIC RH(D): CPT | Performed by: INTERNAL MEDICINE

## 2019-04-04 PROCEDURE — 85025 COMPLETE CBC W/AUTO DIFF WBC: CPT | Performed by: PHYSICIAN ASSISTANT

## 2019-04-04 PROCEDURE — 86900 BLOOD TYPING SEROLOGIC ABO: CPT | Performed by: INTERNAL MEDICINE

## 2019-04-04 ASSESSMENT — MIFFLIN-ST. JEOR: SCORE: 997.56

## 2019-04-04 ASSESSMENT — PAIN SCALES - GENERAL: PAINLEVEL: NO PAIN (0)

## 2019-04-04 NOTE — PROGRESS NOTES
"  HPI:  Lisseth Raymundo returns to follow up her aplastic anemia. She is currently on cyclosporine 75 mg BID and eltrombopag 150 mg daily. She has not had any side effects from these medications. She complains of a \"scratchy\" throat but she denies pain in her throat, recent coughs, fevers, or chills. She has a runny nose which she attributes to her allergies. She takes loratadine for her runny nose. She has not noticed any bleeding or easy bruising. Her energy levels have been well, she has not had any limitations in her activities. Her appetite has been fine. She does note that she has gained some weight. She is 128 lb today. She was 118 on 10/4/18. She otherwise has no further concerns.     The remainder of a complete 12-point review of systems was completed and was otherwise negative unless noted in HPI.     Physical Exam:  /84 (BP Location: Right arm, Patient Position: Sitting, Cuff Size: Adult Regular)   Pulse 87   Temp 97.4  F (36.3  C) (Oral)   Resp 16   Ht 1.524 m (5')   Wt 58.1 kg (128 lb 1.6 oz)   SpO2 95%   BMI 25.02 kg/m    Wt Readings from Last 5 Encounters:   04/04/19 58.1 kg (128 lb 1.6 oz)   01/10/19 54.9 kg (121 lb)   11/14/18 55.1 kg (121 lb 6.4 oz)   10/08/18 53.5 kg (118 lb)   10/04/18 53.6 kg (118 lb 3.2 oz)   General Appearance:  NAD.  HEENT: Anicteric, atraumatic. Neck supple  Lymph: No palpable cervical or supraclavicular lymphadenopathy.  Respiratory:  Chest clear to auscultation  Cardiovascular: Regular rate and rhythm. no m/r/g,  GI:  Soft, nontender. No rebound or guarding  Skin: No rash  Musculoskeletal: No edema in bilateral lower extremities  Neuro: Alert.  No speech deficits.   Psych: Normal affect    Results for LISSETH RAYMUNDO (MRN [id number]) as of 4/4/2019 18:25   Ref. Range 4/4/2019 14:41   Sodium Latest Ref Range: 133 - 144 mmol/L 140   Potassium Latest Ref Range: 3.4 - 5.3 mmol/L 4.5   Chloride Latest Ref Range: 94 - 109 mmol/L 115 (H)   Carbon Dioxide Latest Ref " Range: 20 - 32 mmol/L 20   Urea Nitrogen Latest Ref Range: 7 - 30 mg/dL 40 (H)   Creatinine Latest Ref Range: 0.52 - 1.04 mg/dL 1.27 (H)   GFR Estimate Latest Ref Range: >60 mL/min/1.73_m2 41 (L)   GFR Estimate If Black Latest Ref Range: >60 mL/min/1.73_m2 48 (L)   Calcium Latest Ref Range: 8.5 - 10.1 mg/dL 8.8   Anion Gap Latest Ref Range: 3 - 14 mmol/L 6   Albumin Latest Ref Range: 3.4 - 5.0 g/dL 3.5   Protein Total Latest Ref Range: 6.8 - 8.8 g/dL 7.5   Bilirubin Total Latest Ref Range: 0.2 - 1.3 mg/dL 1.0   Alkaline Phosphatase Latest Ref Range: 40 - 150 U/L 62   ALT Latest Ref Range: 0 - 50 U/L 15   AST Latest Ref Range: 0 - 45 U/L 12   Glucose Latest Ref Range: 70 - 99 mg/dL 88   WBC Latest Ref Range: 4.0 - 11.0 10e9/L 3.5 (L)   Hemoglobin Latest Ref Range: 11.7 - 15.7 g/dL 8.9 (L)   Hematocrit Latest Ref Range: 35.0 - 47.0 % 28.4 (L)   Platelet Count Latest Ref Range: 150 - 450 10e9/L 74 (L)   RBC Count Latest Ref Range: 3.8 - 5.2 10e12/L 2.28 (L)   MCV Latest Ref Range: 78 - 100 fl 125 (H)   MCH Latest Ref Range: 26.5 - 33.0 pg 39.0 (H)   MCHC Latest Ref Range: 31.5 - 36.5 g/dL 31.3 (L)   RDW Latest Ref Range: 10.0 - 15.0 % 15.6 (H)   Diff Method Unknown Automated Method   % Neutrophils Latest Units: % 48.3   % Lymphocytes Latest Units: % 38.8   % Monocytes Latest Units: % 8.9   % Eosinophils Latest Units: % 3.4   % Basophils Latest Units: % 0.3   % Immature Granulocytes Latest Units: % 0.3   Nucleated RBCs Latest Ref Range: 0 /100 1 (H)   Absolute Neutrophil Latest Ref Range: 1.6 - 8.3 10e9/L 1.7   Absolute Lymphocytes Latest Ref Range: 0.8 - 5.3 10e9/L 1.4   Absolute Monocytes Latest Ref Range: 0.0 - 1.3 10e9/L 0.3   Absolute Eosinophils Latest Ref Range: 0.0 - 0.7 10e9/L 0.1   Absolute Basophils Latest Ref Range: 0.0 - 0.2 10e9/L 0.0   Abs Immature Granulocytes Latest Ref Range: 0 - 0.4 10e9/L 0.0   Absolute Nucleated RBC Unknown 0.0       Assessment and Plan:  Bonny Raymundo is a 73 year old female with  aplastic anemia. She is on cyclosporine 75 mg BID and eltrombopag 150 mg daily. She is tolerating these medications well. Her WBC, platelets, and hemoglobin has remained at her baseline. Her kidney function is also at baseline. Other electrolytes within normal limits. We will make no change in her medications    Scribed by Lonny Guzmán, MS4 for Dr. Pebbles Garcia Onc attending;    Reviewed and edited above note  Return for AMELIA f/u. No recent infecitons or bleeding  No new sx except scratchy throat s other signs of infection.  GI  ENT Resp and other sx neg.  Exam  with nl VS.  s rash or edema  No bone tenderness  No resp or cardiac findings    Labs with stable but sl lower plts; No other worrisome findings.  Cont same meds and f/u q ~6 weeks.   CSA level today 76; roughly the same as previously on this lower dose.    No particular need to follow CSA levels in future as she's not tolerated full therapeutic CSA levels.   Will treat to clinical response and renal tolerance. so no change in dose now.  Rafita Rogel

## 2019-04-04 NOTE — NURSING NOTE
Oncology Rooming Note    April 4, 2019 2:56 PM   Bonny Raymundo is a 73 year old female who presents for:    Chief Complaint   Patient presents with     Oncology Clinic Visit     Return: Aplastic Anemia      Blood Draw     Labs drawn from venipuncture by RN in lab. Vs taken and pt checked in for appt.     Initial Vitals: /84 (BP Location: Right arm, Patient Position: Sitting, Cuff Size: Adult Regular)   Pulse 87   Temp 97.4  F (36.3  C) (Oral)   Resp 16   Ht 1.524 m (5')   Wt 58.1 kg (128 lb 1.6 oz)   SpO2 95%   BMI 25.02 kg/m   Estimated body mass index is 25.02 kg/m  as calculated from the following:    Height as of this encounter: 1.524 m (5').    Weight as of this encounter: 58.1 kg (128 lb 1.6 oz). Body surface area is 1.57 meters squared.  No Pain (0) Comment: Data Unavailable   No LMP recorded. Patient has had a hysterectomy.  Allergies reviewed: Yes  Medications reviewed: Yes    Medications: Medication refills not needed today.  Pharmacy name entered into [facility]:    [facility], [place] - [address]  [facility] [place], [place] - [address] [facility]  [facility], [address]  WRITTEN PRESCRIPTION REQUESTED  [id number] - [place], [place] - [address] [facility]    Clinical concerns: N/A      Mary Iyer CMA

## 2019-04-04 NOTE — NURSING NOTE
Chief Complaint   Patient presents with     Oncology Clinic Visit     Return: Aplastic Anemia      Blood Draw     Labs drawn from venipuncture by RN in lab. Vs taken and pt checked in for appt     Labs drawn from venipuncture by RN in lab. Vs taken and pt checked in for appt    Ana Maria Muller RN

## 2019-04-04 NOTE — NURSING NOTE
Chief Complaint   Patient presents with     Oncology Clinic Visit     Return: Aplastic Anemia      Blood Draw     Labs drawn from venipuncture by RN in lab. Vs taken and pt checked in for appt.     Labs drawn from venipuncture by RN in lab. Vs taken and pt checked in for appt.    Ana Maria Muller RN

## 2019-04-04 NOTE — LETTER
"4/4/2019      RE: Lisseth Raymundo  [address] [facility] [facility] [address]         HPI:  Lisseth Raymundo returns to follow up her aplastic anemia. She is currently on cyclosporine 75 mg BID and eltrombopag 150 mg daily. She has not had any side effects from these medications. She complains of a \"scratchy\" throat but she denies pain in her throat, recent coughs, fevers, or chills. She has a runny nose which she attributes to her allergies. She takes loratadine for her runny nose. She has not noticed any bleeding or easy bruising. Her energy levels have been well, she has not had any limitations in her activities. Her appetite has been fine. She does note that she has gained some weight. She is 128 lb today. She was 118 on 10/4/18. She otherwise has no further concerns.     The remainder of a complete 12-point review of systems was completed and was otherwise negative unless noted in HPI.     Physical Exam:  /84 (BP Location: Right arm, Patient Position: Sitting, Cuff Size: Adult Regular)   Pulse 87   Temp 97.4  F (36.3  C) (Oral)   Resp 16   Ht 1.524 m (5')   Wt 58.1 kg (128 lb 1.6 oz)   SpO2 95%   BMI 25.02 kg/m     Wt Readings from Last 5 Encounters:   04/04/19 58.1 kg (128 lb 1.6 oz)   01/10/19 54.9 kg (121 lb)   11/14/18 55.1 kg (121 lb 6.4 oz)   10/08/18 53.5 kg (118 lb)   10/04/18 53.6 kg (118 lb 3.2 oz)   General Appearance:  NAD.  HEENT: Anicteric, atraumatic. Neck supple  Lymph: No palpable cervical or supraclavicular lymphadenopathy.  Respiratory:  Chest clear to auscultation  Cardiovascular: Regular rate and rhythm. no m/r/g,  GI:  Soft, nontender. No rebound or guarding  Skin: No rash  Musculoskeletal: No edema in bilateral lower extremities  Neuro: Alert.  No speech deficits.   Psych: Normal affect    Results for LISSETH RAYMUNDO (MRN [id number]) as of 4/4/2019 18:25   Ref. Range 4/4/2019 14:41   Sodium Latest Ref Range: 133 - 144 mmol/L 140   Potassium Latest Ref Range: 3.4 - 5.3 mmol/L " 4.5   Chloride Latest Ref Range: 94 - 109 mmol/L 115 (H)   Carbon Dioxide Latest Ref Range: 20 - 32 mmol/L 20   Urea Nitrogen Latest Ref Range: 7 - 30 mg/dL 40 (H)   Creatinine Latest Ref Range: 0.52 - 1.04 mg/dL 1.27 (H)   GFR Estimate Latest Ref Range: >60 mL/min/1.73_m2 41 (L)   GFR Estimate If Black Latest Ref Range: >60 mL/min/1.73_m2 48 (L)   Calcium Latest Ref Range: 8.5 - 10.1 mg/dL 8.8   Anion Gap Latest Ref Range: 3 - 14 mmol/L 6   Albumin Latest Ref Range: 3.4 - 5.0 g/dL 3.5   Protein Total Latest Ref Range: 6.8 - 8.8 g/dL 7.5   Bilirubin Total Latest Ref Range: 0.2 - 1.3 mg/dL 1.0   Alkaline Phosphatase Latest Ref Range: 40 - 150 U/L 62   ALT Latest Ref Range: 0 - 50 U/L 15   AST Latest Ref Range: 0 - 45 U/L 12   Glucose Latest Ref Range: 70 - 99 mg/dL 88   WBC Latest Ref Range: 4.0 - 11.0 10e9/L 3.5 (L)   Hemoglobin Latest Ref Range: 11.7 - 15.7 g/dL 8.9 (L)   Hematocrit Latest Ref Range: 35.0 - 47.0 % 28.4 (L)   Platelet Count Latest Ref Range: 150 - 450 10e9/L 74 (L)   RBC Count Latest Ref Range: 3.8 - 5.2 10e12/L 2.28 (L)   MCV Latest Ref Range: 78 - 100 fl 125 (H)   MCH Latest Ref Range: 26.5 - 33.0 pg 39.0 (H)   MCHC Latest Ref Range: 31.5 - 36.5 g/dL 31.3 (L)   RDW Latest Ref Range: 10.0 - 15.0 % 15.6 (H)   Diff Method Unknown Automated Method   % Neutrophils Latest Units: % 48.3   % Lymphocytes Latest Units: % 38.8   % Monocytes Latest Units: % 8.9   % Eosinophils Latest Units: % 3.4   % Basophils Latest Units: % 0.3   % Immature Granulocytes Latest Units: % 0.3   Nucleated RBCs Latest Ref Range: 0 /100 1 (H)   Absolute Neutrophil Latest Ref Range: 1.6 - 8.3 10e9/L 1.7   Absolute Lymphocytes Latest Ref Range: 0.8 - 5.3 10e9/L 1.4   Absolute Monocytes Latest Ref Range: 0.0 - 1.3 10e9/L 0.3   Absolute Eosinophils Latest Ref Range: 0.0 - 0.7 10e9/L 0.1   Absolute Basophils Latest Ref Range: 0.0 - 0.2 10e9/L 0.0   Abs Immature Granulocytes Latest Ref Range: 0 - 0.4 10e9/L 0.0   Absolute Nucleated RBC  Unknown 0.0       Assessment and Plan:  Bonny Raymundo is a 73 year old female with aplastic anemia. She is on cyclosporine 75 mg BID and eltrombopag 150 mg daily. She is tolerating these medications well. Her WBC, platelets, and hemoglobin has remained at her baseline. Her kidney function is also at baseline. Other electrolytes within normal limits. We will make no change in her medications    Scribed by Lonny Guzmán, MS4 for Dr. Rogel     Heme Onc attending;    Reviewed and edited above note  Return for AMELIA f/u. No recent infecitons or bleeding  No new sx except scratchy throat s other signs of infection.  GI  ENT Resp and other sx neg.  Exam  with nl VS.  s rash or edema  No bone tenderness  No resp or cardiac findings    Labs with stable but sl lower plts; No other worrisome findings.  Cont same meds and f/u q ~6 weeks.   CSA level today 76; roughly the same as previously on this lower dose.    No particular need to follow CSA levels in future as she's not tolerated full therapeutic CSA levels.   Will treat to clinical response and renal tolerance. so no change in dose now.  Rafita Rogel MD

## 2019-04-05 LAB
CYCLOSPORINE BLD LC/MS/MS-MCNC: 76 UG/L (ref 50–400)
TME LAST DOSE: NORMAL H

## 2019-05-06 ENCOUNTER — OFFICE VISIT (OUTPATIENT)
Dept: FAMILY MEDICINE | Facility: CLINIC | Age: 74
End: 2019-05-06
Payer: COMMERCIAL

## 2019-05-06 VITALS
WEIGHT: 124 LBS | HEART RATE: 95 BPM | HEIGHT: 60 IN | SYSTOLIC BLOOD PRESSURE: 109 MMHG | BODY MASS INDEX: 24.35 KG/M2 | OXYGEN SATURATION: 95 % | TEMPERATURE: 99.6 F | DIASTOLIC BLOOD PRESSURE: 63 MMHG

## 2019-05-06 DIAGNOSIS — R19.5 LOOSE STOOLS: Primary | ICD-10-CM

## 2019-05-06 DIAGNOSIS — D61.9 APLASTIC ANEMIA (H): ICD-10-CM

## 2019-05-06 DIAGNOSIS — L30.9 PERIANAL DERMATITIS: ICD-10-CM

## 2019-05-06 PROCEDURE — 99214 OFFICE O/P EST MOD 30 MIN: CPT | Performed by: PHYSICIAN ASSISTANT

## 2019-05-06 RX ORDER — MENTHOL AND ZINC OXIDE .44; 20.625 G/100G; G/100G
OINTMENT TOPICAL 4 TIMES DAILY PRN
Qty: 71 G | Refills: 1 | Status: SHIPPED | OUTPATIENT
Start: 2019-05-06 | End: 2020-05-14

## 2019-05-06 ASSESSMENT — MIFFLIN-ST. JEOR: SCORE: 978.96

## 2019-05-06 NOTE — PROGRESS NOTES
HPI: Bonny is a 74 yo female with aplastic anemia here for loose stools  For the past 3-4 weeks has intermittent soft stools   Stools are soft and not watery  Denies abd pain, fever or untint wt loss.  Has about 3 BMs per day which is irritating her bottom  She has loose stools like this off and on in the past as well but typically doesn't last this long.  She has some burning due to such freq BMs  She has int hemorrhoids on her last colonoscopy ()  She has hx of diverticulosis  Denies using any new meds  Denies cramping, pain or blood  She is having to bathe 2-3 times per day  She tried zinc oxide cream and powder    Past Medical History:   Diagnosis Date     Allergic rhinitis due to other allergen      Aplastic anemia (H) 2017     Closed anterior dislocation of humerus      DVT of lower extremity (deep venous thrombosis) (H) 10-12    Left after prolonged sitting-plane     DVT, recurrent, lower extremity, acute (H)      Headache(784.0)      Osteoporosis, unspecified      Pulmonary emboli (H) 10-12     Sensorineural hearing loss, unspecified      Sprain of ankle, unspecified site     L     Past Surgical History:   Procedure Laterality Date     ARTHRODESIS FOOT  11    Hallux valgus R-1st MP joint     BLEPHAROPLASTY BILATERAL  ,      BONE MARROW BIOPSY, BONE SPECIMEN, NEEDLE/TROCAR N/A 2016    Procedure: BIOPSY BONE MARROW;  Surgeon: Mu Morgan MD;  Location:  GI     BONE MARROW BIOPSY, BONE SPECIMEN, NEEDLE/TROCAR N/A 2016    Procedure: BIOPSY BONE MARROW;  Surgeon: Mu Morgan MD;  Location:  GI     C DEXA INTERPRETATION, AXIAL  03     C NONSPECIFIC PROCEDURE           C NONSPECIFIC PROCEDURE      hysterectomy/BSO     C NONSPECIFIC PROCEDURE      myomectomy (fibroids)     CATARACT IOL, RT/LT Right      CATARACT IOL, RT/LT Left      COLONOSCOPY N/A 2018    Procedure: COLONOSCOPY;  colonoscopy;  Surgeon: Meliton Castrejon  MD Christopher;  Location:  GI     EXCHANGE INTRAOCULAR LENS IMPLANT Right 2015    Procedure: EXCHANGE INTRAOCULAR LENS IMPLANT;  Surgeon: Garrett Dawson MD;  Location: [facility]     HC COLONOSCOPY THRU STOMA, DIAGNOSTIC      normal- minimal diverticulosis     PICC INSERTION Left 2017    5fr DL BioFlo PICC, 42cm (2cm external) in the L basilic vein w/ tip in the  SVC RA junction.     VITRECTOMY PARSPLANA WITH 23 GAUGE SYSTEM Right 2015    Procedure: VITRECTOMY PARSPLANA WITH 23 GAUGE SYSTEM;  Surgeon: Racheal Loyd MD;  Location: [facility]     Social History     Tobacco Use     Smoking status: Former Smoker     Last attempt to quit: 1973     Years since quittin.0     Smokeless tobacco: Never Used   Substance Use Topics     Alcohol use: No     Alcohol/week: 0.0 oz     Comment: occasionally     Current Outpatient Medications   Medication Sig Dispense Refill     Acetaminophen (TYLENOL PO) Take 325 mg by mouth every 6 hours as needed for mild pain or fever       Calcium Carb-Cholecalciferol (CALCIUM + D3) 600-200 MG-UNIT TABS Take 1 tablet by mouth 2 times daily 60 tablet      COMPRESSION STOCKINGS Wear compression stockings at 20-30 mmHg rating most time during the day to the affected leg (left leg) or both legs. Take them off at night. 2 each 2     cycloSPORINE modified (GENERIC EQUIVALENT) 25 MG capsule Take 3 capsules (75 mg) by mouth 2 times daily or as directed 180 capsule 6     diphenhydrAMINE (BENADRYL ALLERGY) 25 MG tablet Take 25 mg by mouth At Bedtime  56 tablet      eltrombopag (PROMACTA) 50 MG tablet Take 3 tablets (150 mg) by mouth daily Administer on an empty stomach, 1 hour before or 2 hours after a meal. Or as directed 90 tablet 6     loratadine (CLARITIN) 10 MG tablet Take 10 mg by mouth daily as needed Reported on 2017       menthol-zinc oxide (CALMOSEPTINE) 0.44-20.625 % OINT ointment Apply topically 4 times daily as needed for skin protection 71 g 1     ORDER  FOR DME Equipment being ordered: knee high compression stockings- 18-20 mm 1 Box 1     pantoprazole (PROTONIX) 20 MG EC tablet Take 1 tablet (20 mg) by mouth daily 30 tablet 11     Allergies   Allergen Reactions     Cats      Dogs      Seasonal Allergies      FAMILY HISTORY NOTED AND REVIEWED    PHYSICAL EXAM:    /63 (BP Location: Right arm, Patient Position: Chair, Cuff Size: Adult Regular)   Pulse 95   Temp 99.6  F (37.6  C) (Oral)   Ht 1.524 m (5')   Wt 56.2 kg (124 lb)   SpO2 95%   BMI 24.22 kg/m      Patient appears non toxic  Abd: soft, NT/ND. No masses.  Perianal area appears irritated    Assessment and Plan:     (R19.5) Loose stools  (primary encounter diagnosis)  Comment: ? IBS flare  Plan: recd she start one scoop of metamucil powder daily to bulk stools. Has up to date colonoscopy which is reviewed with pt at time of visit.  Doubt C diff or infection. Pt not ill. Pt to f/u if sxs worsen or persist.    (L30.9) Perianal dermatitis  Comment:   Plan: menthol-zinc oxide (CALMOSEPTINE) 0.44-20.625 %        OINT ointment        Qid prn    (D61.9) Aplastic anemia (H)  Comment:   Plan: per hematology. Labs up to date.      Shivani Phelps PA-C

## 2019-05-22 ENCOUNTER — ONCOLOGY VISIT (OUTPATIENT)
Dept: ONCOLOGY | Facility: CLINIC | Age: 74
End: 2019-05-22
Attending: PHYSICIAN ASSISTANT
Payer: COMMERCIAL

## 2019-05-22 ENCOUNTER — APPOINTMENT (OUTPATIENT)
Dept: LAB | Facility: CLINIC | Age: 74
End: 2019-05-22
Attending: INTERNAL MEDICINE
Payer: COMMERCIAL

## 2019-05-22 VITALS
DIASTOLIC BLOOD PRESSURE: 75 MMHG | HEART RATE: 74 BPM | HEIGHT: 60 IN | SYSTOLIC BLOOD PRESSURE: 136 MMHG | BODY MASS INDEX: 24.39 KG/M2 | OXYGEN SATURATION: 96 % | TEMPERATURE: 98 F | WEIGHT: 124.2 LBS

## 2019-05-22 DIAGNOSIS — D61.3 IDIOPATHIC APLASTIC ANEMIA (H): ICD-10-CM

## 2019-05-22 LAB
ALBUMIN SERPL-MCNC: 3.3 G/DL (ref 3.4–5)
ALP SERPL-CCNC: 91 U/L (ref 40–150)
ALT SERPL W P-5'-P-CCNC: 21 U/L (ref 0–50)
ANION GAP SERPL CALCULATED.3IONS-SCNC: 9 MMOL/L (ref 3–14)
AST SERPL W P-5'-P-CCNC: 11 U/L (ref 0–45)
BASOPHILS # BLD AUTO: 0 10E9/L (ref 0–0.2)
BASOPHILS NFR BLD AUTO: 0.3 %
BILIRUB SERPL-MCNC: 1.2 MG/DL (ref 0.2–1.3)
BUN SERPL-MCNC: 46 MG/DL (ref 7–30)
CALCIUM SERPL-MCNC: 9.1 MG/DL (ref 8.5–10.1)
CHLORIDE SERPL-SCNC: 109 MMOL/L (ref 94–109)
CO2 SERPL-SCNC: 21 MMOL/L (ref 20–32)
CREAT SERPL-MCNC: 1.53 MG/DL (ref 0.52–1.04)
DIFFERENTIAL METHOD BLD: ABNORMAL
EOSINOPHIL # BLD AUTO: 0.1 10E9/L (ref 0–0.7)
EOSINOPHIL NFR BLD AUTO: 3.5 %
ERYTHROCYTE [DISTWIDTH] IN BLOOD BY AUTOMATED COUNT: 14.2 % (ref 10–15)
GFR SERPL CREATININE-BSD FRML MDRD: 33 ML/MIN/{1.73_M2}
GLUCOSE SERPL-MCNC: 101 MG/DL (ref 70–99)
HCT VFR BLD AUTO: 27.3 % (ref 35–47)
HGB BLD-MCNC: 9 G/DL (ref 11.7–15.7)
IMM GRANULOCYTES # BLD: 0 10E9/L (ref 0–0.4)
IMM GRANULOCYTES NFR BLD: 0.3 %
LYMPHOCYTES # BLD AUTO: 1.3 10E9/L (ref 0.8–5.3)
LYMPHOCYTES NFR BLD AUTO: 33.8 %
MCH RBC QN AUTO: 39.8 PG (ref 26.5–33)
MCHC RBC AUTO-ENTMCNC: 33 G/DL (ref 31.5–36.5)
MCV RBC AUTO: 121 FL (ref 78–100)
MONOCYTES # BLD AUTO: 0.3 10E9/L (ref 0–1.3)
MONOCYTES NFR BLD AUTO: 7.5 %
NEUTROPHILS # BLD AUTO: 2 10E9/L (ref 1.6–8.3)
NEUTROPHILS NFR BLD AUTO: 54.6 %
NRBC # BLD AUTO: 0 10*3/UL
NRBC BLD AUTO-RTO: 0 /100
PLATELET # BLD AUTO: 63 10E9/L (ref 150–450)
POTASSIUM SERPL-SCNC: 4.1 MMOL/L (ref 3.4–5.3)
PROT SERPL-MCNC: 7.4 G/DL (ref 6.8–8.8)
RBC # BLD AUTO: 2.26 10E12/L (ref 3.8–5.2)
SODIUM SERPL-SCNC: 139 MMOL/L (ref 133–144)
WBC # BLD AUTO: 3.7 10E9/L (ref 4–11)

## 2019-05-22 PROCEDURE — 85025 COMPLETE CBC W/AUTO DIFF WBC: CPT | Performed by: INTERNAL MEDICINE

## 2019-05-22 PROCEDURE — 36415 COLL VENOUS BLD VENIPUNCTURE: CPT

## 2019-05-22 PROCEDURE — 80053 COMPREHEN METABOLIC PANEL: CPT | Performed by: INTERNAL MEDICINE

## 2019-05-22 PROCEDURE — 99214 OFFICE O/P EST MOD 30 MIN: CPT | Mod: ZP | Performed by: PHYSICIAN ASSISTANT

## 2019-05-22 ASSESSMENT — PAIN SCALES - GENERAL: PAINLEVEL: NO PAIN (0)

## 2019-05-22 ASSESSMENT — MIFFLIN-ST. JEOR: SCORE: 979.87

## 2019-05-22 NOTE — NURSING NOTE
Oncology Rooming Note    May 22, 2019 12:41 PM   Bonny Raymundo is a 73 year old female who presents for:    Chief Complaint   Patient presents with     Blood Draw     Pt here for venipuncture blood draw, vitals completed by Ma, pt checked in for appt.      Initial Vitals: Blood Pressure 136/75 (BP Location: Right leg, Patient Position: Sitting, Cuff Size: Adult Regular)   Pulse 74   Temperature 98  F (36.7  C) (Oral)   Height 1.524 m (5')   Weight 56.3 kg (124 lb 3.2 oz)   Oxygen Saturation 96%   Body Mass Index 24.26 kg/m   Estimated body mass index is 24.26 kg/m  as calculated from the following:    Height as of this encounter: 1.524 m (5').    Weight as of this encounter: 56.3 kg (124 lb 3.2 oz). Body surface area is 1.54 meters squared.  No Pain (0) Comment: Data Unavailable   No LMP recorded. Patient has had a hysterectomy.  Allergies reviewed: Yes  Medications reviewed: Yes    Medications: Medication refills not needed today.  Pharmacy name entered into [facility]:    [facility], [place] - [address]  [facility], [place] - [address] [facility], [address]  WRITTEN PRESCRIPTION REQUESTED  [id number] - [place], [place] - [address] [facility]    Clinical concerns: none       Skylar Hood MA

## 2019-06-10 ENCOUNTER — OFFICE VISIT (OUTPATIENT)
Dept: FAMILY MEDICINE | Facility: CLINIC | Age: 74
End: 2019-06-10
Payer: COMMERCIAL

## 2019-06-10 VITALS
BODY MASS INDEX: 24.35 KG/M2 | WEIGHT: 124 LBS | OXYGEN SATURATION: 94 % | DIASTOLIC BLOOD PRESSURE: 72 MMHG | HEART RATE: 72 BPM | HEIGHT: 60 IN | SYSTOLIC BLOOD PRESSURE: 123 MMHG | TEMPERATURE: 97.9 F

## 2019-06-10 DIAGNOSIS — H65.21 RIGHT CHRONIC SEROUS OTITIS MEDIA: Primary | ICD-10-CM

## 2019-06-10 PROCEDURE — 99213 OFFICE O/P EST LOW 20 MIN: CPT | Performed by: NURSE PRACTITIONER

## 2019-06-10 ASSESSMENT — MIFFLIN-ST. JEOR: SCORE: 978.96

## 2019-06-10 NOTE — PROGRESS NOTES
Subjective     Bonny Raymundo is a 73 year old female who presents to clinic today for the following health issues:    HPI   Decreased hearing and fluid in right earX 2-3 weeks   Denies pain, fevers and discharge from ear.   Had bilateral myringotomies within the last 2 years by Dr Krueger  She has no ear pain and no discharge from ears    Last Sept was seen for same in left ear and at that time the tube fellout- blood seen behind the TM  Was present in the right ear        Reviewed and updated as needed this visit by Provider       Review of Systems   ROS COMP: Constitutional, HEENT, cardiovascular, pulmonary, gi and gu systems are negative, except as otherwise noted.      Objective    /72 (BP Location: Left arm, Cuff Size: Adult Regular)   Pulse 72   Temp 97.9  F (36.6  C) (Oral)   Ht 1.524 m (5')   Wt 56.2 kg (124 lb)   SpO2 94%   BMI 24.22 kg/m    Body mass index is 24.22 kg/m .  Physical Exam   GENERAL: pale, alert and no distress  EYES: Eyes grossly normal to inspection, PERRL and conjunctivae and sclerae normal  HENT: ear canals normal left TM normal: right TM intact , no tubes either eardrum,  There appears to be thick yvette fluid and blood behind the RIGHT eardrum, nose and mouth without ulcers or lesions  NECK: no adenopathy, no asymmetry, masses, or scars   Diagnostic Test Results:  Labs reviewed in Epic        Assessment & Plan       ICD-10-CM    1. Right chronic serous otitis media H65.21 OTOLARYNGOLOGY REFERRAL   her serous otitis is chronic and has been previously treated by specialist with myringotomy  She will follow up with Dr Krueger at this time      KHADRA Escalante [facility]

## 2019-06-17 ENCOUNTER — TRANSFERRED RECORDS (OUTPATIENT)
Dept: HEALTH INFORMATION MANAGEMENT | Facility: CLINIC | Age: 74
End: 2019-06-17

## 2019-06-18 NOTE — PROGRESS NOTES
[facility] CANCER CLINIC  FOLLOW-UP VISIT NOTE  Date of visit: May 22, 2019            REASON FOR VISIT: Aplastic anemia, routine 6 week follow up    HPI: Bonny is a 72 year old female who presented in the fall of 2016 with fatigue and shortness of breath. 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  Her counts showed pancytopenia and BMBX was concerning for aplastic anemia.  By December of 2016 she was transfusion dependent with platelets under 10 k and ANC dropped under 500. 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  She has met with Dr Mcdaniel at [facility] and consulted with Dr Rogel at [facility].      After further consultation it was decided to admit on 1/9/17 for ATG/cyclosporine/prednisone therapy.     The following was her inpatient regimen:  Day 1= 1/9/17  ATG 2500 mg (40 mg/kg0 q24 hours D-4)  Cyclosporine 200 mg (3 mg/kg) PO bid  Eltrombopag 50 mg daily  Methylpred 31 mg (0.5 mg/kg) q24 hours D1-4  Prednisone 60 mg (1 mg/kg) daily after completion of IV methylpred to continue for at least 2 weeks      INTERVAL HISTORY: Bonny is here today for her q6w follow up. Bonny seems to be doing really well.  She is walking her dog and active running erands.  She denies any nausea/vomiting or GERD.  No fevers, chest pain and and no bleeding. Daily BM, has been dealing with some looser stools, seems to be improving, no black/blood. No  issues.     ROS: complete review of systems was performed which was negative unless noted above.    EXAM:  /75 (BP Location: Right leg, Patient Position: Sitting, Cuff Size: Adult Regular)   Pulse 74   Temp 98  F (36.7  C) (Oral)   Ht 1.524 m (5')   Wt 56.3 kg (124 lb 3.2 oz)   SpO2 96%   BMI 24.26 kg/m    Wt Readings from Last 4 Encounters:   06/10/19 56.2 kg (124 lb)   05/22/19 56.3 kg (124 lb 3.2 oz)   05/06/19 56.2 kg (124 lb)   04/04/19 58.1 kg (128 lb 1.6 oz)     General: Patient alert  and oriented x3.   EYES: PERRLA, conjunctiva pink  Neck: No palpable cervical, supraclavicular or axillary nodes bilaterally.   Cardiovascular: RRR, no murmurs, gallops or rubs  Respiratory: clear to auscultation bilaterally;  no crackles, rhonchi or wheezing.   Abdomen: positive bowel sounds in all four quadrants, soft, nontender  Neuro: grossly intact.   Mood and affect is stable.       LABS:      2/22/2019 13:09 4/4/2019 14:41 5/22/2019 11:47   Sodium 142 140 139   Potassium 4.2 4.5 4.1   Chloride 113 (H) 115 (H) 109   Carbon Dioxide 23 20 21   Urea Nitrogen 38 (H) 40 (H) 46 (H)   Creatinine 1.27 (H) 1.27 (H) 1.53 (H)   GFR Estimate 41 (L) 41 (L) 33 (L)   GFR Estimate If Black 48 (L) 48 (L) 38 (L)   Calcium 8.7 8.8 9.1   Anion Gap 6 6 9   Albumin 3.2 (L) 3.5 3.3 (L)   Protein Total 7.6 7.5 7.4   Bilirubin Total 1.2 1.0 1.2   Alkaline Phosphatase 68 62 91   ALT 18 15 21   AST 18 12 11   Glucose 99 88 101 (H)   WBC 3.5 (L) 3.5 (L) 3.7 (L)   Hemoglobin 9.1 (L) 8.9 (L) 9.0 (L)   Hematocrit 27.4 (L) 28.4 (L) 27.3 (L)   Platelet Count 64 (L) 74 (L) 63 (L)   RBC Count 2.36 (L) 2.28 (L) 2.26 (L)    (H) 125 (H) 121 (H)       ASSESSMENT/PLAN: 73 year old female with AA, now has been transfusion INDEPENDENT since April 2017.    HEME- Treatment for AA 1/9-1/13/17 with ATG, methylpred, cyclosporine and eltrombopag. Has been off her prednisone and ppx medications since spring.  Her stamina has improved since I saw her last and counts are now independent from transfusions since April of 2017. Her CSA levels have been subtherapeutic however she developed renal toxicity with higher doses so we are keeping her at the following doses:  -cyclosporine 75 mg BID  -eltrombopag 150 mg daily  -counts  q6 w  -transfuse if hgb <7.5 or symptomatic and platelets <30k (or prior to dental issues)  -q6 weeks for labs/visits    Bonny was doing really well today.   RTC in 6 weeks for labs.     Renal: Creat is elevated today.  She has been  dealing with intermittently looser stools, feels its better, encouraged better hydration     HEME-  -Platelets stable at 63k.  Hgb stable at 9.0.   -two prior provoked DVT in 2012 and 2014 with travel. 2012 was associated with PE. Was on warfarin/lovenox in the past. Will keep off ASA until platelets improve more.      ID-  No fever or symptoms of infection.   No ppx medications, CMV was neg.    Completed pentamidine in 2017.    MSK- low back -Low thoracic mid spine pain which has significantly improved.  Has h/o compression fractures and has osteoporosis.  Completed her dental work.    -s/p Prolia in October 2018.  Every 6 months for age related osteoporosis     FEN: bobby improved, weight stable.      GI: has GERD, I cut her Protonix from 40 mg to 20 mg.  If continued stability can cut down again in future.    Health maintenance: sees pcp routinely.  Thyroid biopsy, colonoscopy--WNL.  s/p Shingrix    Sejal Walls PA-C

## 2019-07-11 ENCOUNTER — APPOINTMENT (OUTPATIENT)
Dept: LAB | Facility: CLINIC | Age: 74
End: 2019-07-11
Attending: INTERNAL MEDICINE
Payer: COMMERCIAL

## 2019-07-11 ENCOUNTER — OFFICE VISIT (OUTPATIENT)
Dept: TRANSPLANT | Facility: CLINIC | Age: 74
End: 2019-07-11
Attending: INTERNAL MEDICINE
Payer: COMMERCIAL

## 2019-07-11 VITALS
RESPIRATION RATE: 16 BRPM | SYSTOLIC BLOOD PRESSURE: 147 MMHG | TEMPERATURE: 97.6 F | BODY MASS INDEX: 24.53 KG/M2 | WEIGHT: 125.6 LBS | OXYGEN SATURATION: 98 % | DIASTOLIC BLOOD PRESSURE: 81 MMHG

## 2019-07-11 DIAGNOSIS — D61.3 IDIOPATHIC APLASTIC ANEMIA (H): ICD-10-CM

## 2019-07-11 DIAGNOSIS — D61.818 PANCYTOPENIA (H): ICD-10-CM

## 2019-07-11 DIAGNOSIS — K21.9 GASTROESOPHAGEAL REFLUX DISEASE WITHOUT ESOPHAGITIS: ICD-10-CM

## 2019-07-11 LAB
ABO + RH BLD: NORMAL
ABO + RH BLD: NORMAL
ALBUMIN SERPL-MCNC: 3.8 G/DL (ref 3.4–5)
ALP SERPL-CCNC: 75 U/L (ref 40–150)
ALT SERPL W P-5'-P-CCNC: 18 U/L (ref 0–50)
ANION GAP SERPL CALCULATED.3IONS-SCNC: 7 MMOL/L (ref 3–14)
AST SERPL W P-5'-P-CCNC: 17 U/L (ref 0–45)
BASOPHILS # BLD AUTO: 0 10E9/L (ref 0–0.2)
BASOPHILS NFR BLD AUTO: 0.2 %
BILIRUB SERPL-MCNC: 1.3 MG/DL (ref 0.2–1.3)
BLD GP AB SCN SERPL QL: NORMAL
BLOOD BANK CMNT PATIENT-IMP: NORMAL
BUN SERPL-MCNC: 44 MG/DL (ref 7–30)
CALCIUM SERPL-MCNC: 8.9 MG/DL (ref 8.5–10.1)
CHLORIDE SERPL-SCNC: 112 MMOL/L (ref 94–109)
CO2 SERPL-SCNC: 21 MMOL/L (ref 20–32)
CREAT SERPL-MCNC: 1.5 MG/DL (ref 0.52–1.04)
DIFFERENTIAL METHOD BLD: ABNORMAL
EOSINOPHIL # BLD AUTO: 0.1 10E9/L (ref 0–0.7)
EOSINOPHIL NFR BLD AUTO: 3.3 %
ERYTHROCYTE [DISTWIDTH] IN BLOOD BY AUTOMATED COUNT: 14.7 % (ref 10–15)
GFR SERPL CREATININE-BSD FRML MDRD: 34 ML/MIN/{1.73_M2}
GLUCOSE SERPL-MCNC: 133 MG/DL (ref 70–99)
HCT VFR BLD AUTO: 29.1 % (ref 35–47)
HGB BLD-MCNC: 9.4 G/DL (ref 11.7–15.7)
IMM GRANULOCYTES # BLD: 0 10E9/L (ref 0–0.4)
IMM GRANULOCYTES NFR BLD: 0.2 %
LYMPHOCYTES # BLD AUTO: 1.4 10E9/L (ref 0.8–5.3)
LYMPHOCYTES NFR BLD AUTO: 33.3 %
MCH RBC QN AUTO: 39.3 PG (ref 26.5–33)
MCHC RBC AUTO-ENTMCNC: 32.3 G/DL (ref 31.5–36.5)
MCV RBC AUTO: 122 FL (ref 78–100)
MONOCYTES # BLD AUTO: 0.3 10E9/L (ref 0–1.3)
MONOCYTES NFR BLD AUTO: 7.6 %
NEUTROPHILS # BLD AUTO: 2.3 10E9/L (ref 1.6–8.3)
NEUTROPHILS NFR BLD AUTO: 55.4 %
NRBC # BLD AUTO: 0 10*3/UL
NRBC BLD AUTO-RTO: 1 /100
PLATELET # BLD AUTO: 58 10E9/L (ref 150–450)
POTASSIUM SERPL-SCNC: 3.9 MMOL/L (ref 3.4–5.3)
PROT SERPL-MCNC: 7.9 G/DL (ref 6.8–8.8)
RBC # BLD AUTO: 2.39 10E12/L (ref 3.8–5.2)
SODIUM SERPL-SCNC: 140 MMOL/L (ref 133–144)
SPECIMEN EXP DATE BLD: NORMAL
WBC # BLD AUTO: 4.2 10E9/L (ref 4–11)

## 2019-07-11 PROCEDURE — 80053 COMPREHEN METABOLIC PANEL: CPT | Performed by: PHYSICIAN ASSISTANT

## 2019-07-11 PROCEDURE — 86901 BLOOD TYPING SEROLOGIC RH(D): CPT | Performed by: INTERNAL MEDICINE

## 2019-07-11 PROCEDURE — 86900 BLOOD TYPING SEROLOGIC ABO: CPT | Performed by: INTERNAL MEDICINE

## 2019-07-11 PROCEDURE — 36415 COLL VENOUS BLD VENIPUNCTURE: CPT

## 2019-07-11 PROCEDURE — G0463 HOSPITAL OUTPT CLINIC VISIT: HCPCS

## 2019-07-11 PROCEDURE — 86850 RBC ANTIBODY SCREEN: CPT | Performed by: INTERNAL MEDICINE

## 2019-07-11 PROCEDURE — 85025 COMPLETE CBC W/AUTO DIFF WBC: CPT | Performed by: PHYSICIAN ASSISTANT

## 2019-07-11 RX ORDER — PANTOPRAZOLE SODIUM 20 MG/1
20 TABLET, DELAYED RELEASE ORAL DAILY
Qty: 30 TABLET | Refills: 11 | Status: SHIPPED | OUTPATIENT
Start: 2019-07-11 | End: 2019-10-03

## 2019-07-11 ASSESSMENT — PAIN SCALES - GENERAL: PAINLEVEL: NO PAIN (0)

## 2019-07-11 NOTE — LETTER
7/11/2019      RE: Bonny Raymundo  [address]  [facility] [address]       /81 (BP Location: Right arm, Patient Position: Sitting, Cuff Size: Adult Regular)   Temp 97.6  F (36.4  C) (Oral)   Resp 16   Wt 57 kg (125 lb 9.6 oz)   SpO2 98%   BMI 24.53 kg/m      Wt Readings from Last 4 Encounters:   07/11/19 57 kg (125 lb 9.6 oz)   06/10/19 56.2 kg (124 lb)   05/22/19 56.3 kg (124 lb 3.2 oz)   05/06/19 56.2 kg (124 lb)   Bonny returns to follow-up her severe aplastic anemia.  She remains on low-dose cyclosporine and eltrombopag.  She has not had recent overt infections but has had progressive hearing loss and is interested in an additional audiology consultation (follow-up from February 2018) here to discuss hearing aids.  She follows with an outside ENT physician who is managed her serous otitis and ear tubes, currently on the left) for some time.  She also feels like that in addition to the hearing loss, her voice is weak and she sometimes gets hoarse because she feels as though she is shouting.  She has no trouble swallowing, aspiration or sense of dyspnea when she is eating or drinking.  There is no throat pain.    Her other complaint is numbing paresthesias on the sole of her left foot.  This L leg had a serious deep vein thrombosis with accompanying pulmonary embolism in September 2012 but no clots since.  Once in a while it swells and she has compression stockings but over the last few weeks she thinks the numbness it has been a bit more prominent.  There is no numbness on the right and no paresthesias or clumsiness in her upper extremities or hands.  She has no new GI  or respiratory complaints.  She has no skin rash or external bleeding.    Her exam shows her weight is stable for some time and her vital signs are acceptable.  She has no more than trace peripheral edema in the left ankle; none on the R..  She has no skin rash or petechiae.  Her oropharynx is clear.  Her left ear shows an  ear tube in place and some scarring on the drum but I do not appreciate middle ear fluid though that exam is not my strength.  The right eardrum shows scarring but no active inflammation.  Her oropharynx is clear without mucosal lesions.  There is no neck mass or palpable abnormalities while swallowing.  She has no cervical or supraclavicular adenopathy.  Her lungs are clear.  Her heart tones are regular with a soft ejection murmur.  Her abdomen shows no focal tenderness, mass or hepatosplenomegaly.    Laboratory testing showed stable blood counts with chronic but not much change in her thrombocytopenia over the last year and her chemistries are acceptable.    I said made no change in her chronic medication and is we are not following cyclosporine levels as she stays on the same dose since she has had chronic renal insufficiency with higher doses of cyclosporine.  The eltrombopag is also essential and should continue.    I recommended that she follow through on the audiology evaluation and she chose to have the evaluation here at the [place] and to discuss her weak voice and hoarseness with her ENT physician at their next visit.    We will continue to see her here roughly every 2 months to monitor her aplastic anemia but she knows to call if additional issues develop    Rafita Rogel MD  Professor of medicine    Results for LISSETH PARIKH (MRN [id number]) as of 7/11/2019 21:13   Ref. Range 5/22/2019 11:47 7/11/2019 15:32   Sodium Latest Ref Range: 133 - 144 mmol/L 139 140   Potassium Latest Ref Range: 3.4 - 5.3 mmol/L 4.1 3.9   Chloride Latest Ref Range: 94 - 109 mmol/L 109 112 (H)   Carbon Dioxide Latest Ref Range: 20 - 32 mmol/L 21 21   Urea Nitrogen Latest Ref Range: 7 - 30 mg/dL 46 (H) 44 (H)   Creatinine Latest Ref Range: 0.52 - 1.04 mg/dL 1.53 (H) 1.50 (H)   GFR Estimate Latest Ref Range: >60 mL/min/1.73_m2 33 (L) 34 (L)   GFR Estimate If Black Latest Ref Range: >60 mL/min/1.73_m2 38 (L) 39 (L)    Calcium Latest Ref Range: 8.5 - 10.1 mg/dL 9.1 8.9   Anion Gap Latest Ref Range: 3 - 14 mmol/L 9 7   Albumin Latest Ref Range: 3.4 - 5.0 g/dL 3.3 (L) 3.8   Protein Total Latest Ref Range: 6.8 - 8.8 g/dL 7.4 7.9   Bilirubin Total Latest Ref Range: 0.2 - 1.3 mg/dL 1.2 1.3   Alkaline Phosphatase Latest Ref Range: 40 - 150 U/L 91 75   ALT Latest Ref Range: 0 - 50 U/L 21 18   AST Latest Ref Range: 0 - 45 U/L 11 17   Glucose Latest Ref Range: 70 - 99 mg/dL 101 (H) 133 (H)   WBC Latest Ref Range: 4.0 - 11.0 10e9/L 3.7 (L) 4.2   Hemoglobin Latest Ref Range: 11.7 - 15.7 g/dL 9.0 (L) 9.4 (L)   Hematocrit Latest Ref Range: 35.0 - 47.0 % 27.3 (L) 29.1 (L)   Platelet Count Latest Ref Range: 150 - 450 10e9/L 63 (L) 58 (L)   RBC Count Latest Ref Range: 3.8 - 5.2 10e12/L 2.26 (L) 2.39 (L)   MCV Latest Ref Range: 78 - 100 fl 121 (H) 122 (H)   MCH Latest Ref Range: 26.5 - 33.0 pg 39.8 (H) 39.3 (H)   MCHC Latest Ref Range: 31.5 - 36.5 g/dL 33.0 32.3   RDW Latest Ref Range: 10.0 - 15.0 % 14.2 14.7   Diff Method Unknown Automated Method Automated Method   % Neutrophils Latest Units: % 54.6 55.4   % Lymphocytes Latest Units: % 33.8 33.3   % Monocytes Latest Units: % 7.5 7.6   % Eosinophils Latest Units: % 3.5 3.3   % Basophils Latest Units: % 0.3 0.2   % Immature Granulocytes Latest Units: % 0.3 0.2   Nucleated RBCs Latest Ref Range: 0 /100 0 1 (H)   Absolute Neutrophil Latest Ref Range: 1.6 - 8.3 10e9/L 2.0 2.3   Absolute Lymphocytes Latest Ref Range: 0.8 - 5.3 10e9/L 1.3 1.4   Absolute Monocytes Latest Ref Range: 0.0 - 1.3 10e9/L 0.3 0.3   Absolute Eosinophils Latest Ref Range: 0.0 - 0.7 10e9/L 0.1 0.1   Absolute Basophils Latest Ref Range: 0.0 - 0.2 10e9/L 0.0 0.0   Abs Immature Granulocytes Latest Ref Range: 0 - 0.4 10e9/L 0.0 0.0   Absolute Nucleated RBC Unknown 0.0 0.0   ABO Unknown  A   RH(D) Unknown  Pos   Antibody Screen Unknown  Neg   Test Valid Only At Latest Units:      [facility]   Specimen Expires Unknown  07/14/2019     Rafita Rogel MD

## 2019-07-11 NOTE — NURSING NOTE
Oncology Rooming Note    July 11, 2019 4:06 PM   Bonny Raymundo is a 73 year old female who presents for:    Chief Complaint   Patient presents with     Blood Draw     Labs drawn via  by RN in lab. VS taken.      RECHECK     Patient here for provider visit r/t AA     Initial Vitals: /81 (BP Location: Right arm, Patient Position: Sitting, Cuff Size: Adult Regular)   Temp 97.6  F (36.4  C) (Oral)   Resp 16   Wt 57 kg (125 lb 9.6 oz)   SpO2 98%   BMI 24.53 kg/m   Estimated body mass index is 24.53 kg/m  as calculated from the following:    Height as of 6/10/19: 1.524 m (5').    Weight as of this encounter: 57 kg (125 lb 9.6 oz). Body surface area is 1.55 meters squared.  No Pain (0) Comment: Data Unavailable   No LMP recorded. Patient has had a hysterectomy.  Allergies reviewed: Yes  Medications reviewed: Yes    Medications: MEDICATION REFILLS NEEDED TODAY. Provider was notified.  Pharmacy name entered into [facility]:    [facility], [place] - [address]  [facility], [place] - [address] [facility]  [facility], [address]  WRITTEN PRESCRIPTION REQUESTED  [id number] - [place], [place] - [address]    Clinical concerns: ANGIE Rios RN

## 2019-07-11 NOTE — NURSING NOTE
Chief Complaint   Patient presents with     Blood Draw     Labs drawn via  by RN in lab. VS taken.      Labs drawn via venipuncture. Vital signs taken. Checked into next appointment.   Lana Griggs RN

## 2019-07-11 NOTE — PROGRESS NOTES
/81 (BP Location: Right arm, Patient Position: Sitting, Cuff Size: Adult Regular)   Temp 97.6  F (36.4  C) (Oral)   Resp 16   Wt 57 kg (125 lb 9.6 oz)   SpO2 98%   BMI 24.53 kg/m     Wt Readings from Last 4 Encounters:   07/11/19 57 kg (125 lb 9.6 oz)   06/10/19 56.2 kg (124 lb)   05/22/19 56.3 kg (124 lb 3.2 oz)   05/06/19 56.2 kg (124 lb)   Bonny returns to follow-up her severe aplastic anemia.  She remains on low-dose cyclosporine and eltrombopag.  She has not had recent overt infections but has had progressive hearing loss and is interested in an additional audiology consultation (follow-up from February 2018) here to discuss hearing aids.  She follows with an outside ENT physician who is managed her serous otitis and ear tubes, currently on the left) for some time.  She also feels like that in addition to the hearing loss, her voice is weak and she sometimes gets hoarse because she feels as though she is shouting.  She has no trouble swallowing, aspiration or sense of dyspnea when she is eating or drinking.  There is no throat pain.    Her other complaint is numbing paresthesias on the sole of her left foot.  This L leg had a serious deep vein thrombosis with accompanying pulmonary embolism in September 2012 but no clots since.  Once in a while it swells and she has compression stockings but over the last few weeks she thinks the numbness it has been a bit more prominent.  There is no numbness on the right and no paresthesias or clumsiness in her upper extremities or hands.  She has no new GI  or respiratory complaints.  She has no skin rash or external bleeding.    Her exam shows her weight is stable for some time and her vital signs are acceptable.  She has no more than trace peripheral edema in the left ankle; none on the R..  She has no skin rash or petechiae.  Her oropharynx is clear.  Her left ear shows an ear tube in place and some scarring on the drum but I do not appreciate middle ear fluid  though that exam is not my strength.  The right eardrum shows scarring but no active inflammation.  Her oropharynx is clear without mucosal lesions.  There is no neck mass or palpable abnormalities while swallowing.  She has no cervical or supraclavicular adenopathy.  Her lungs are clear.  Her heart tones are regular with a soft ejection murmur.  Her abdomen shows no focal tenderness, mass or hepatosplenomegaly.    Laboratory testing showed stable blood counts with chronic but not much change in her thrombocytopenia over the last year and her chemistries are acceptable.    I said made no change in her chronic medication and is we are not following cyclosporine levels as she stays on the same dose since she has had chronic renal insufficiency with higher doses of cyclosporine.  The eltrombopag is also essential and should continue.    I recommended that she follow through on the audiology evaluation and she chose to have the evaluation here at the [facility] and to discuss her weak voice and hoarseness with her ENT physician at their next visit.    We will continue to see her here roughly every 2 months to monitor her aplastic anemia but she knows to call if additional issues develop    Rafita Rogel MD  Professor of medicine    Results for LISSETH PARIKH (MRN [id number]) as of 7/11/2019 21:13   Ref. Range 5/22/2019 11:47 7/11/2019 15:32   Sodium Latest Ref Range: 133 - 144 mmol/L 139 140   Potassium Latest Ref Range: 3.4 - 5.3 mmol/L 4.1 3.9   Chloride Latest Ref Range: 94 - 109 mmol/L 109 112 (H)   Carbon Dioxide Latest Ref Range: 20 - 32 mmol/L 21 21   Urea Nitrogen Latest Ref Range: 7 - 30 mg/dL 46 (H) 44 (H)   Creatinine Latest Ref Range: 0.52 - 1.04 mg/dL 1.53 (H) 1.50 (H)   GFR Estimate Latest Ref Range: >60 mL/min/1.73_m2 33 (L) 34 (L)   GFR Estimate If Black Latest Ref Range: >60 mL/min/1.73_m2 38 (L) 39 (L)   Calcium Latest Ref Range: 8.5 - 10.1 mg/dL 9.1 8.9   Anion Gap Latest Ref Range: 3 - 14 mmol/L  9 7   Albumin Latest Ref Range: 3.4 - 5.0 g/dL 3.3 (L) 3.8   Protein Total Latest Ref Range: 6.8 - 8.8 g/dL 7.4 7.9   Bilirubin Total Latest Ref Range: 0.2 - 1.3 mg/dL 1.2 1.3   Alkaline Phosphatase Latest Ref Range: 40 - 150 U/L 91 75   ALT Latest Ref Range: 0 - 50 U/L 21 18   AST Latest Ref Range: 0 - 45 U/L 11 17   Glucose Latest Ref Range: 70 - 99 mg/dL 101 (H) 133 (H)   WBC Latest Ref Range: 4.0 - 11.0 10e9/L 3.7 (L) 4.2   Hemoglobin Latest Ref Range: 11.7 - 15.7 g/dL 9.0 (L) 9.4 (L)   Hematocrit Latest Ref Range: 35.0 - 47.0 % 27.3 (L) 29.1 (L)   Platelet Count Latest Ref Range: 150 - 450 10e9/L 63 (L) 58 (L)   RBC Count Latest Ref Range: 3.8 - 5.2 10e12/L 2.26 (L) 2.39 (L)   MCV Latest Ref Range: 78 - 100 fl 121 (H) 122 (H)   MCH Latest Ref Range: 26.5 - 33.0 pg 39.8 (H) 39.3 (H)   MCHC Latest Ref Range: 31.5 - 36.5 g/dL 33.0 32.3   RDW Latest Ref Range: 10.0 - 15.0 % 14.2 14.7   Diff Method Unknown Automated Method Automated Method   % Neutrophils Latest Units: % 54.6 55.4   % Lymphocytes Latest Units: % 33.8 33.3   % Monocytes Latest Units: % 7.5 7.6   % Eosinophils Latest Units: % 3.5 3.3   % Basophils Latest Units: % 0.3 0.2   % Immature Granulocytes Latest Units: % 0.3 0.2   Nucleated RBCs Latest Ref Range: 0 /100 0 1 (H)   Absolute Neutrophil Latest Ref Range: 1.6 - 8.3 10e9/L 2.0 2.3   Absolute Lymphocytes Latest Ref Range: 0.8 - 5.3 10e9/L 1.3 1.4   Absolute Monocytes Latest Ref Range: 0.0 - 1.3 10e9/L 0.3 0.3   Absolute Eosinophils Latest Ref Range: 0.0 - 0.7 10e9/L 0.1 0.1   Absolute Basophils Latest Ref Range: 0.0 - 0.2 10e9/L 0.0 0.0   Abs Immature Granulocytes Latest Ref Range: 0 - 0.4 10e9/L 0.0 0.0   Absolute Nucleated RBC Unknown 0.0 0.0   ABO Unknown  A   RH(D) Unknown  Pos   Antibody Screen Unknown  Neg   Test Valid Only At Latest Units:      [facility]   Specimen Expires Unknown  07/14/2019

## 2019-07-26 NOTE — PROGRESS NOTES
[facility] CANCER CLINIC  FOLLOW-UP VISIT NOTE  Date of visit: 2-10-17          REASON FOR VISIT: Aplastic anemia, weekly follow up    HPI: Bonny is a 71 year old female who presented in the fall of 2016 with fatigue and shortness of breath. 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  Her counts showed pancytopenia and BMBX was concerning for aplastic anemia.  By December of 2016 she was transfusion dependent with platelets under 10 k and ANC dropped under 500. 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  She has met with Dr Mcdaniel at [facility] and consulted with Dr Rogel at [facility].      After further consultation it was decided to admit on 1/9/17 for ATG/cyclosporine/prednisone therapy.     The following was her inpatient regimen:  Day 1= 1/9/17  ATG 2500 mg (40 mg/kg0 q24 hours D-4)  Cyclosporine 200 mg (3 mg/kg) PO bid  Eltrombopag 50 mg daily  Methylpred 31 mg (0.5 mg/kg) q24 hours D1-4  Prednisone 60 mg (1 mg/kg) daily after completion of IV methylpred to continue for at least 2 weeks    Bonny also had a admission 1/23-1/25/17 for rectal bleeding (presumed hemorrhoidal).  See DC summary for details.     INTERVAL HISTORY: Bonny comes in for weekly follow up.  She feels she is stable. She continues to have a dry mouth and drinks sub-optimally (24 oz daily). She is eating well and friends/family have been bringing her meals.  Her LBP was getting better and now worse again this week.  It continues to wax/wane, she feels she sits around too much and wishes she could move more, but the longer she is up and around the worse the pain is.  Takes Tylenol 3-4 times a day and oxycodone 0-2 times.  Heat actually helps the most.   Her energy levels are stable.  She has OCASIO in the last couple weeks.  Not dizzy.  No bleeding, anywhere.     No fevers, chest pain.  Daily BM, no black/blood. No  issues.     EXAM:  /58 mmHg  Pulse 98   Temp(Src) 97.4  F (36.3  C)  SpO2 92%  Wt Readings from Last 4 Encounters:   02/02/17 60.328 kg (133 lb)   01/26/17 60.3 kg (132 lb 15 oz)   01/25/17 60.737 kg (133 lb 14.4 oz)   01/17/17 61.598 kg (135 lb 12.8 oz)     Vital signs were reviewed.   Patient alert and oriented x3.  In wheelchair today  PERRLA. EOMI. No scleral icterus noted. OP without thrush/sores.  Neck exam: No palpable cervical, supraclavicular or axillary nodes bilaterally.   Heart: RRR no murmurs noted.   Lungs: clear to auscultation bilaterally.  No crackles or wheezing.   Abd: positive bowel sounds in all four quadrants.  No tenderness to palpation.  No hepatomegaly.   Extremities: Edema mild in left leg, stable  Neuro: grossly intact.   MSK: I cannot reproduce her LBP but seems in the lower thoracic spine on the left (different than my last exam)  Mood and affect is stable.       LABS:      1/26/2017 14:10 1/29/2017 09:35 1/31/2017 09:30 2/2/2017 11:57 2/5/2017 09:13 2/7/2017 09:21 2/10/2017 09:22   WBC 1.3 (L) 3.1 (L) 3.6 (L) 2.8 (L) 4.0 3.7 (L) 2.4 (L)   Hemoglobin 10.6 (L) 10.2 (L) 9.3 (L) 8.8 (L) 8.8 (L) 8.3 (L) 7.7 (L)   Hematocrit 30.4 (L) 29.1 (L) 26.6 (L) 26.3 (L) 25.5 (L) 23.8 (L) 22.1 (L)   Platelet Count 42 (LL) 17 (LL) 12 (LL) 4 (LL) 33 (LL) 18 (LL) 24 (LL)   RBC Count 3.47 (L) 3.35 (L) 3.07 (L) 2.94 (L) 2.90 (L) 2.74 (L) 2.50 (L)   MCV 88 87 87 90 88 87 88   Absolute Neutrophil 0.4 (LL) 0.3 (LL) 0.4 (LL) 0.2 (LL) 0.5 (L) 0.7 (L) 0.7 (L)        1/26/2017 14:10 2/2/2017 11:57 2/10/2017 09:22   Sodium 138 138 138   Potassium 4.3 4.8 4.4   Chloride 105 102 101   Carbon Dioxide 24 27 26   Urea Nitrogen 22 32 (H) 47 (H)   Creatinine 0.73 1.04 1.47 (H)   GFR Estimate 78 52 (L) 35 (L)   GFR Estimate If Black >90... 63 42 (L)   Calcium 8.5 8.6 9.0   Anion Gap 9 10 12   Albumin 3.1 (L) 3.2 (L) 3.2 (L)   Protein Total 7.3 7.2 7.2   Bilirubin Total 2.2 (H) 2.1 (H) 2.6 (H)   Alkaline Phosphatase 112 105 103   ALT 74 (H) 33 31   AST 45 13 16      ASSESSMENT/PLAN: 71 year old female with AA, currently pancytopenic 2/2 to disease and further worsening from recent therapy    HEME- Treatment for AA 1/9-1/13/17 with ATG, methylpred, cyclosporine and eltrombopag.   -cyclosporine 150 mg BID decreased today due to ELSA/hyperbili. Per pharmacy: hold tonight and start 150 mg BID tomorrow 2/11. Continue weekly checks, keep level between 200-400. Next check on 2/14 at [facility].   -decrease to 40 mg daily of prednisone  After that, will decrease by 10 mg EVERY OTHER WEEK  -eltrombopag 150 mg daily  -Will need transfusion support 3 x week, worked on schedule today mostly at  [facility] (Sunday, Tuesday each week, [facility] Friday)  -transfuse if hgb <8 and platelets <30k (will get 1 unit of PRBC and platelets today)      CV-two prior provoked DVT in 2012 and 2014 with travel. 2012 was associated with PE. Was on warfarin/lovenox in the past.  Transitioned to ASA which has been held in the setting of low platelets  -Norvasc stopped 1/25, BP stable    ID- ANC improved today at 700. No fever or symptoms of infection.  -continue flucon 200 mg daily + levaquin 500 mg daily  -CMV was neg  -inh pentamidine 1/24, next 2/24    MSK- low back pain for last month.  Low thoracic mid spine pain.  Last CXR showed compression fractures in her vertebral bodies.  Need to get some further imaging at some point.  For now continue prn tylenol and oxycodone    FEN: weight stable.  Fluid intake sub optimal, suggested over a liter of fluid intake daily.  ELSA today- likely 2/2 to CSA (see above)    GI: mild constipation, continue alternating senna + miralax.   -bili elevation, hemolysis was r/o but will recheck labs again today.  Likely 2/2 to CSA, recheck on 2/14 (CMP + CSA level)  -continue ppx protonix        Sejal Walls PA-C   Stable

## 2019-08-08 ENCOUNTER — TRANSFERRED RECORDS (OUTPATIENT)
Dept: HEALTH INFORMATION MANAGEMENT | Facility: CLINIC | Age: 74
End: 2019-08-08

## 2019-08-21 ENCOUNTER — ONCOLOGY VISIT (OUTPATIENT)
Dept: ONCOLOGY | Facility: CLINIC | Age: 74
End: 2019-08-21
Attending: PHYSICIAN ASSISTANT
Payer: COMMERCIAL

## 2019-08-21 ENCOUNTER — APPOINTMENT (OUTPATIENT)
Dept: LAB | Facility: CLINIC | Age: 74
End: 2019-08-21
Attending: PHYSICIAN ASSISTANT
Payer: COMMERCIAL

## 2019-08-21 VITALS
RESPIRATION RATE: 16 BRPM | WEIGHT: 123.6 LBS | BODY MASS INDEX: 24.14 KG/M2 | TEMPERATURE: 97.7 F | DIASTOLIC BLOOD PRESSURE: 82 MMHG | HEART RATE: 75 BPM | OXYGEN SATURATION: 96 % | SYSTOLIC BLOOD PRESSURE: 135 MMHG

## 2019-08-21 DIAGNOSIS — D61.3 IDIOPATHIC APLASTIC ANEMIA (H): ICD-10-CM

## 2019-08-21 DIAGNOSIS — D61.818 PANCYTOPENIA (H): ICD-10-CM

## 2019-08-21 DIAGNOSIS — M54.6 MIDLINE THORACIC BACK PAIN, UNSPECIFIED CHRONICITY: ICD-10-CM

## 2019-08-21 LAB
ALBUMIN SERPL-MCNC: 3.4 G/DL (ref 3.4–5)
ALP SERPL-CCNC: 314 U/L (ref 40–150)
ALT SERPL W P-5'-P-CCNC: 184 U/L (ref 0–50)
ANION GAP SERPL CALCULATED.3IONS-SCNC: 8 MMOL/L (ref 3–14)
AST SERPL W P-5'-P-CCNC: 192 U/L (ref 0–45)
BASOPHILS # BLD AUTO: 0 10E9/L (ref 0–0.2)
BASOPHILS NFR BLD AUTO: 0.2 %
BILIRUB SERPL-MCNC: 1.3 MG/DL (ref 0.2–1.3)
BUN SERPL-MCNC: 43 MG/DL (ref 7–30)
CALCIUM SERPL-MCNC: 8.7 MG/DL (ref 8.5–10.1)
CHLORIDE SERPL-SCNC: 111 MMOL/L (ref 94–109)
CO2 SERPL-SCNC: 21 MMOL/L (ref 20–32)
CREAT SERPL-MCNC: 1.47 MG/DL (ref 0.52–1.04)
DIFFERENTIAL METHOD BLD: ABNORMAL
EOSINOPHIL # BLD AUTO: 0.1 10E9/L (ref 0–0.7)
EOSINOPHIL NFR BLD AUTO: 1.8 %
ERYTHROCYTE [DISTWIDTH] IN BLOOD BY AUTOMATED COUNT: 14.8 % (ref 10–15)
GFR SERPL CREATININE-BSD FRML MDRD: 34 ML/MIN/{1.73_M2}
GLUCOSE SERPL-MCNC: 96 MG/DL (ref 70–99)
HCT VFR BLD AUTO: 27.5 % (ref 35–47)
HGB BLD-MCNC: 8.9 G/DL (ref 11.7–15.7)
IMM GRANULOCYTES # BLD: 0 10E9/L (ref 0–0.4)
IMM GRANULOCYTES NFR BLD: 0.2 %
LYMPHOCYTES # BLD AUTO: 1 10E9/L (ref 0.8–5.3)
LYMPHOCYTES NFR BLD AUTO: 21.7 %
MAGNESIUM SERPL-MCNC: 2.1 MG/DL (ref 1.6–2.3)
MCH RBC QN AUTO: 39.2 PG (ref 26.5–33)
MCHC RBC AUTO-ENTMCNC: 32.4 G/DL (ref 31.5–36.5)
MCV RBC AUTO: 121 FL (ref 78–100)
MONOCYTES # BLD AUTO: 0.4 10E9/L (ref 0–1.3)
MONOCYTES NFR BLD AUTO: 9.4 %
NEUTROPHILS # BLD AUTO: 2.9 10E9/L (ref 1.6–8.3)
NEUTROPHILS NFR BLD AUTO: 66.7 %
NRBC # BLD AUTO: 0 10*3/UL
NRBC BLD AUTO-RTO: 0 /100
PLATELET # BLD AUTO: 61 10E9/L (ref 150–450)
POTASSIUM SERPL-SCNC: 4.8 MMOL/L (ref 3.4–5.3)
PROT SERPL-MCNC: 7.6 G/DL (ref 6.8–8.8)
RBC # BLD AUTO: 2.27 10E12/L (ref 3.8–5.2)
SODIUM SERPL-SCNC: 139 MMOL/L (ref 133–144)
WBC # BLD AUTO: 4.4 10E9/L (ref 4–11)

## 2019-08-21 PROCEDURE — 36415 COLL VENOUS BLD VENIPUNCTURE: CPT

## 2019-08-21 PROCEDURE — 85025 COMPLETE CBC W/AUTO DIFF WBC: CPT | Performed by: INTERNAL MEDICINE

## 2019-08-21 PROCEDURE — 83735 ASSAY OF MAGNESIUM: CPT | Performed by: INTERNAL MEDICINE

## 2019-08-21 PROCEDURE — 80053 COMPREHEN METABOLIC PANEL: CPT | Performed by: INTERNAL MEDICINE

## 2019-08-21 PROCEDURE — 99214 OFFICE O/P EST MOD 30 MIN: CPT | Mod: ZP | Performed by: PHYSICIAN ASSISTANT

## 2019-08-21 PROCEDURE — G0463 HOSPITAL OUTPT CLINIC VISIT: HCPCS | Mod: ZF

## 2019-08-21 RX ORDER — TRAMADOL HYDROCHLORIDE 50 MG/1
50-100 TABLET ORAL EVERY 8 HOURS PRN
Qty: 40 TABLET | Refills: 0 | Status: SHIPPED | OUTPATIENT
Start: 2019-08-21 | End: 2019-09-16

## 2019-08-21 ASSESSMENT — PAIN SCALES - GENERAL: PAINLEVEL: NO PAIN (0)

## 2019-08-21 NOTE — NURSING NOTE
Chief Complaint   Patient presents with     Blood Draw     labs drawn by venipuncture by rn.  vital signs taken.     Labs drawn by venipuncture by RN in lab.  Vital signs taken.  Pt checked in to next appointment.  Blank Ackerman RN

## 2019-08-21 NOTE — NURSING NOTE
Oncology Rooming Note    August 21, 2019 12:36 PM   Bonny Raymundo is a 73 year old female who presents for:    Chief Complaint   Patient presents with     Blood Draw     labs drawn by venipuncture by rn.  vital signs taken.     Oncology Clinic Visit     Anemia , Labs      Initial Vitals: /82 (BP Location: Right arm, Patient Position: Sitting, Cuff Size: Adult Regular)   Pulse 75   Temp 97.7  F (36.5  C) (Oral)   Resp 16   Wt 56.1 kg (123 lb 9.6 oz)   SpO2 96%   BMI 24.14 kg/m   Estimated body mass index is 24.14 kg/m  as calculated from the following:    Height as of 6/10/19: 1.524 m (5').    Weight as of this encounter: 56.1 kg (123 lb 9.6 oz). Body surface area is 1.54 meters squared.  No Pain (0) Comment: Data Unavailable   No LMP recorded. Patient has had a hysterectomy.  Allergies reviewed: Yes  Medications reviewed: Yes    Medications: Medication refills not needed today.  Pharmacy name entered into [facility]:    [facility], [place] - [address]  [facility], [place] - [address] [facility] [address]  [facility], [place] - [address] [place] [address]  WRITTEN PRESCRIPTION REQUESTED  [id number]  [place], [place] - [address]    Clinical concerns: no  Sejal was notified.      Janeth Schilling MA

## 2019-08-22 NOTE — PROGRESS NOTES
[facility] CANCER CLINIC  FOLLOW-UP VISIT NOTE  Date of visit: Aug 21, 2019            REASON FOR VISIT: Aplastic anemia, routine 6 week follow up    HPI: Bonny is a 72 year old female who presented in the fall of 2016 with fatigue and shortness of breath. 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  Her counts showed pancytopenia and BMBX was concerning for aplastic anemia.  By December of 2016 she was transfusion dependent with platelets under 10 k and ANC dropped under 500. 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  She has met with Dr Mcdaniel at [facility] and consulted with Dr Rogel at [facility].      After further consultation it was decided to admit on 1/9/17 for ATG/cyclosporine/prednisone therapy.     The following was her inpatient regimen:  Day 1= 1/9/17  ATG 2500 mg (40 mg/kg0 q24 hours D-4)  Cyclosporine 200 mg (3 mg/kg) PO bid  Eltrombopag 50 mg daily  Methylpred 31 mg (0.5 mg/kg) q24 hours D1-4  Prednisone 60 mg (1 mg/kg) daily after completion of IV methylpred to continue for at least 2 weeks      INTERVAL HISTORY: Bonny is here today for her q6w follow up. Bonny seems to be doing really well.  She is walking her dog and active running erands.  She denies any nausea/vomiting or GERD.  No fevers, chest pain and and no bleeding. Daily BM, has been dealing with some looser stools, seems to be improving with fiber, no black/blood. No  issues. Still dealing with back pain and taking Tylenol 1 gram TID. No radiculopathy.    ROS: complete review of systems was performed which was negative unless noted above.    EXAM:  /82 (BP Location: Right arm, Patient Position: Sitting, Cuff Size: Adult Regular)   Pulse 75   Temp 97.7  F (36.5  C) (Oral)   Resp 16   Wt 56.1 kg (123 lb 9.6 oz)   SpO2 96%   BMI 24.14 kg/m    Wt Readings from Last 4 Encounters:   08/21/19 56.1 kg (123 lb 9.6 oz)   07/11/19 57 kg (125 lb 9.6 oz)    06/10/19 56.2 kg (124 lb)   05/22/19 56.3 kg (124 lb 3.2 oz)     General: Patient alert and oriented x3.   EYES: PERRLA, conjunctiva pink  Neck: No palpable cervical, supraclavicular or axillary nodes bilaterally.   Cardiovascular: RRR, no murmurs, gallops or rubs  Respiratory: clear to auscultation bilaterally;  no crackles, rhonchi or wheezing.   Abdomen: positive bowel sounds in all four quadrants, soft, nontender.  No palpable liver  Neuro: grossly intact.   Mood and affect is stable.       LABS:      4/4/2019 14:41 5/22/2019 11:47 7/11/2019 15:32 8/21/2019 12:18   Sodium 140 139 140 139   Potassium 4.5 4.1 3.9 4.8   Chloride 115 (H) 109 112 (H) 111 (H)   Carbon Dioxide 20 21 21 21   Urea Nitrogen 40 (H) 46 (H) 44 (H) 43 (H)   Creatinine 1.27 (H) 1.53 (H) 1.50 (H) 1.47 (H)   GFR Estimate 41 (L) 33 (L) 34 (L) 34 (L)   GFR Estimate If Black 48 (L) 38 (L) 39 (L) 40 (L)   Calcium 8.8 9.1 8.9 8.7   Anion Gap 6 9 7 8   Magnesium    2.1   Albumin 3.5 3.3 (L) 3.8 3.4   Protein Total 7.5 7.4 7.9 7.6   Bilirubin Total 1.0 1.2 1.3 1.3   Alkaline Phosphatase 62 91 75 314 (H)   ALT 15 21 18 184 (H)   AST 12 11 17 192 (H)   Glucose 88 101 (H) 133 (H) 96   WBC 3.5 (L) 3.7 (L) 4.2 4.4   Hemoglobin 8.9 (L) 9.0 (L) 9.4 (L) 8.9 (L)   Hematocrit 28.4 (L) 27.3 (L) 29.1 (L) 27.5 (L)   Platelet Count 74 (L) 63 (L) 58 (L) 61 (L)   RBC Count 2.28 (L) 2.26 (L) 2.39 (L) 2.27 (L)    (H) 121 (H) 122 (H) 121 (H)         ASSESSMENT/PLAN: 73 year old female with AA, now has been transfusion INDEPENDENT since April 2017.    HEME- Treatment for AA 1/9-1/13/17 with ATG, methylpred, cyclosporine and eltrombopag. She has been independent from transfusions since April of 2017. Her CSA levels have been subtherapeutic however she developed renal toxicity with higher doses so we are keeping her at the following doses:  -cyclosporine 75 mg BID  -eltrombopag 150 mg daily  -counts  q6 w  -transfuse if hgb <7.5 or symptomatic and platelets <30k  (or prior to dental issues)  -q6 weeks for labs/visits    LFT elevation: Acute, non toxic today.  No etoh or supplements.  No abdominal pain or change in her soft stools.  Is taking tylenol 1 gram TID, which we'll stop today. It would be odd to have CSA toxicity to the liver at her lower subtherapeutic dose, 2.5 years into taking it.  If elevation does not improve with holding Tylenol or she becomes ill, can recheck CMV/EBV and/or consider imaging.  Recheck locally next week and I will call her.      Renal: Creat is mildly elevated today, stable.    HEME-  -Platelets stable at 61k.  Hgb stable at 8.9.   -two prior provoked DVT in 2012 and 2014 with travel. 2012 was associated with PE. Was on warfarin/lovenox in the past. Will keep off ASA until platelets improve more.      ID-  No fever or symptoms of infection.   No ppx medications, CMV was neg.    Completed pentamidine in 2017.    MSK- low back -Low thoracic mid spine pain which is stable.  Given we are holding her Tylenol- will refill tramadol to take BID prn for pain.   Has h/o compression fractures and has osteoporosis.     -s/p Prolia in October 2018.  Every 6 months for age related osteoporosis-OVERDUE, we decided to sort out her liver issues first and next week when I call her about her labs we'll get this scheduled     FEN: bobby improved, weight stable.      GI: has GERD, cont Protonix 20 mg.     Health maintenance: sees pcp routinely.  Thyroid biopsy, colonoscopy--WNL.  s/p Shingrix.  Will need flu shot this fall.     Sejal Walls PA-C    AMEND:      8/21/2019 12:18 8/30/2019 11:00   Bilirubin Total 1.3 1.3   Alkaline Phosphatase 314 (H) 162 (H)    (H) 28    (H) 15     Elevated LFT's improved with holding her Tylenol (was taking a gram TID), encouraged her to stay with 2 doses or less a day of her Tylenol. She was actually fine getting by with the Tramadol once daily.   LGR

## 2019-08-27 ENCOUNTER — OFFICE VISIT (OUTPATIENT)
Dept: FAMILY MEDICINE | Facility: CLINIC | Age: 74
End: 2019-08-27
Payer: COMMERCIAL

## 2019-08-27 VITALS
DIASTOLIC BLOOD PRESSURE: 62 MMHG | TEMPERATURE: 98.7 F | SYSTOLIC BLOOD PRESSURE: 117 MMHG | WEIGHT: 124 LBS | BODY MASS INDEX: 24.22 KG/M2 | HEART RATE: 90 BPM | OXYGEN SATURATION: 97 %

## 2019-08-27 DIAGNOSIS — R30.0 DYSURIA: Primary | ICD-10-CM

## 2019-08-27 DIAGNOSIS — N30.01 ACUTE CYSTITIS WITH HEMATURIA: ICD-10-CM

## 2019-08-27 DIAGNOSIS — D61.9 APLASTIC ANEMIA (H): ICD-10-CM

## 2019-08-27 LAB
ALBUMIN UR-MCNC: 100 MG/DL
APPEARANCE UR: CLEAR
BACTERIA #/AREA URNS HPF: ABNORMAL /HPF
BILIRUB UR QL STRIP: ABNORMAL
COLOR UR AUTO: YELLOW
GLUCOSE UR STRIP-MCNC: NEGATIVE MG/DL
HGB UR QL STRIP: ABNORMAL
KETONES UR STRIP-MCNC: NEGATIVE MG/DL
LEUKOCYTE ESTERASE UR QL STRIP: ABNORMAL
NITRATE UR QL: NEGATIVE
PH UR STRIP: 5.5 PH (ref 5–7)
RBC #/AREA URNS AUTO: ABNORMAL /HPF
SOURCE: ABNORMAL
SP GR UR STRIP: 1.02 (ref 1–1.03)
UROBILINOGEN UR STRIP-ACNC: 2 EU/DL (ref 0.2–1)
WBC #/AREA URNS AUTO: >100 /HPF

## 2019-08-27 PROCEDURE — 87088 URINE BACTERIA CULTURE: CPT | Performed by: NURSE PRACTITIONER

## 2019-08-27 PROCEDURE — 87086 URINE CULTURE/COLONY COUNT: CPT | Performed by: NURSE PRACTITIONER

## 2019-08-27 PROCEDURE — 99213 OFFICE O/P EST LOW 20 MIN: CPT | Performed by: NURSE PRACTITIONER

## 2019-08-27 PROCEDURE — 81001 URINALYSIS AUTO W/SCOPE: CPT | Performed by: NURSE PRACTITIONER

## 2019-08-27 PROCEDURE — 87186 SC STD MICRODIL/AGAR DIL: CPT | Performed by: NURSE PRACTITIONER

## 2019-08-27 RX ORDER — SULFAMETHOXAZOLE/TRIMETHOPRIM 800-160 MG
1 TABLET ORAL 2 TIMES DAILY
Qty: 14 TABLET | Refills: 0 | Status: SHIPPED | OUTPATIENT
Start: 2019-08-27 | End: 2019-09-16

## 2019-08-27 NOTE — LETTER
[facility]  [address]  [address]  [place]  [address]  Tel: [phone]    September 17, 2019    Bonny Raymundo  [address] [facility] [address]        Dear Ms. Raymundo,    The bactrim was a good choice to fight this infection, Bonny.   I hope is has completely cleared.     If you have any further questions or problems, please contact our office.      Sincerely,    María Rock NP/feliz          Enclosure: Lab Results                                        Results for orders placed or performed in visit on 08/27/19   *UA reflex to Microscopic and Culture ([place] [facility] (except [place] and [place])   Result Value Ref Range    Color Urine Yellow     Appearance Urine Clear     Glucose Urine Negative NEG^Negative mg/dL    Bilirubin Urine Small (A) NEG^Negative    Ketones Urine Negative NEG^Negative mg/dL    Specific Gravity Urine 1.025 1.003 - 1.035    Blood Urine Moderate (A) NEG^Negative    pH Urine 5.5 5.0 - 7.0 pH    Protein Albumin Urine 100 (A) NEG^Negative mg/dL    Urobilinogen Urine 2.0 (H) 0.2 - 1.0 EU/dL    Nitrite Urine Negative NEG^Negative    Leukocyte Esterase Urine Small (A) NEG^Negative    Source Midstream Urine    Urine Microscopic   Result Value Ref Range    WBC Urine >100 (A) OTO5^0 - 5 /HPF    RBC Urine O - 2 OTO2^O - 2 /HPF    Bacteria Urine Many (A) NEG^Negative /HPF   Urine Culture Aerobic Bacterial   Result Value Ref Range    Specimen Description Midstream Urine     Culture Micro >100,000 colonies/mL  Escherichia coli   (A)     Culture Micro >100,000 colonies/mL  Klebsiella pneumoniae   (A)        Susceptibility    Escherichia coli - THI     AMPICILLIN 4 Sensitive ug/mL     CEFAZOLIN* <=4 Sensitive ug/mL      * Cefazolin THI breakpoints are for the treatment of uncomplicated urinary tract infections.  For the treatment of systemic infections, please contact the laboratory for additional testing.Cefazolin THI breakpoints are for the treatment of  uncomplicated urinary tract infections.  For the treatment of systemic infections, please contact the laboratory for additional testing.     CEFOXITIN <=4 Sensitive ug/mL     CEFTAZIDIME <=1 Sensitive ug/mL     CEFTRIAXONE <=1 Sensitive ug/mL     CIPROFLOXACIN <=0.25 Sensitive ug/mL     GENTAMICIN <=1 Sensitive ug/mL     LEVOFLOXACIN <=0.12 Sensitive ug/mL     NITROFURANTOIN <=16 Sensitive ug/mL     TOBRAMYCIN <=1 Sensitive ug/mL     Trimethoprim/Sulfa <=1/19 Sensitive ug/mL     AMPICILLIN/SULBACTAM <=2 Sensitive ug/mL     Piperacillin/Tazo <=4 Sensitive ug/mL     CEFEPIME <=1 Sensitive ug/mL    Klebsiella pneumoniae - THI     AMPICILLIN*  Resistant ug/mL      * Intrinsically Resistant     CEFAZOLIN* <=4 Sensitive ug/mL      * Cefazolin THI breakpoints are for the treatment of uncomplicated urinary tract infections.  For the treatment of systemic infections, please contact the laboratory for additional testing.     CEFOXITIN <=4 Sensitive ug/mL     CEFTAZIDIME <=1 Sensitive ug/mL     CEFTRIAXONE <=1 Sensitive ug/mL     CIPROFLOXACIN <=0.25 Sensitive ug/mL     GENTAMICIN <=1 Sensitive ug/mL     LEVOFLOXACIN <=0.12 Sensitive ug/mL     NITROFURANTOIN 64 Intermediate ug/mL     TOBRAMYCIN <=1 Sensitive ug/mL     Trimethoprim/Sulfa <=1/19 Sensitive ug/mL     AMPICILLIN/SULBACTAM 4 Sensitive ug/mL     Piperacillin/Tazo <=4 Sensitive ug/mL     CEFEPIME <=1 Sensitive ug/mL

## 2019-08-27 NOTE — PROGRESS NOTES
Subjective     Bonny Raymundo is a 73 year old female who presents to clinic today for the following health issues:    HPI   URINARY TRACT SYMPTOMS      Duration: 1 week     Description  Frequency- getting up 3 times at night instead of 2 and normal during the day and urgency    Intensity:  mild    Accompanying signs and symptoms:  Fever/chills: no   Flank pain no   Nausea and vomiting: no   Vaginal symptoms: none  Abdominal/Pelvic Pain: no     History- aplastic anemia  History of frequent UTI's: YES  History of kidney stones: no   Sexually Active: no   Possibility of pregnancy: No    Precipitating or alleviating factors: has had some loose stools that are responding to use of metamucil last bm this morning and was satisfactory and formed    Therapies tried and outcome: none   ; has not increased fluid intake     Patient Active Problem List   Diagnosis     DIARRHEA     Esophageal reflux     Chest pain     Osteoporosis     CARDIOVASCULAR SCREENING; LDL GOAL LESS THAN 160     Seasonal allergic rhinitis     PE (pulmonary embolism)     Health Care Home     Sensorineural hearing loss of both ears     BPPV (benign paroxysmal positional vertigo)     Anemia     DVT, recurrent, lower extremity, acute (H)     Pulmonary emboli (H)     Advanced directives, counseling/discussion     Pancytopenia (H)     Aplastic anemia (H)     Thyroid nodule     History of pulmonary embolism     Past Surgical History:   Procedure Laterality Date     ARTHRODESIS FOOT  11    Hallux valgus R-1st MP joint     BLEPHAROPLASTY BILATERAL  ,      BONE MARROW BIOPSY, BONE SPECIMEN, NEEDLE/TROCAR N/A 2016    Procedure: BIOPSY BONE MARROW;  Surgeon: Mu Morgan MD;  Location: [facility]     BONE MARROW BIOPSY, BONE SPECIMEN, NEEDLE/TROCAR N/A 2016    Procedure: BIOPSY BONE MARROW;  Surgeon: Mu Morgan MD;  Location:  GI     C DEXA INTERPRETATION, AXIAL  03     C NONSPECIFIC PROCEDURE            C NONSPECIFIC PROCEDURE      hysterectomy/BSO     C NONSPECIFIC PROCEDURE      myomectomy (fibroids)     CATARACT IOL, RT/LT Right      CATARACT IOL, RT/LT Left      COLONOSCOPY N/A 2018    Procedure: COLONOSCOPY;  colonoscopy;  Surgeon: Meliton Castrejon MD;  Location:  GI     EXCHANGE INTRAOCULAR LENS IMPLANT Right 2015    Procedure: EXCHANGE INTRAOCULAR LENS IMPLANT;  Surgeon: Garrett Dawson MD;  Location: [facility]     HC COLONOSCOPY THRU STOMA, DIAGNOSTIC      normal- minimal diverticulosis     PICC INSERTION Left 2017    5fr DL BioFlo PICC, 42cm (2cm external) in the L basilic vein w/ tip in the  SVC RA junction.     VITRECTOMY PARSPLANA WITH 23 GAUGE SYSTEM Right 2015    Procedure: VITRECTOMY PARSPLANA WITH 23 GAUGE SYSTEM;  Surgeon: Racheal Loyd MD;  Location: [facility]       Social History     Tobacco Use     Smoking status: Former Smoker     Last attempt to quit: 1973     Years since quittin.3     Smokeless tobacco: Never Used   Substance Use Topics     Alcohol use: No     Alcohol/week: 0.0 oz     Comment: occasionally     Family History   Problem Relation Age of Onset     Musculoskeletal Disorder Mother         MS     Heart Disease Father      Heart Disease Brother         afib         Current Outpatient Medications   Medication Sig Dispense Refill     Acetaminophen (TYLENOL PO) Take 325 mg by mouth every 6 hours as needed for mild pain or fever       Calcium Carb-Cholecalciferol (CALCIUM + D3) 600-200 MG-UNIT TABS Take 1 tablet by mouth 2 times daily 60 tablet      COMPRESSION STOCKINGS Wear compression stockings at 20-30 mmHg rating most time during the day to the affected leg (left leg) or both legs. Take them off at night. 2 each 2     cycloSPORINE modified (GENERIC EQUIVALENT) 25 MG capsule Take 3 capsules (75 mg) by mouth 2 times daily or as directed 180 capsule 6     diphenhydrAMINE (BENADRYL ALLERGY) 25 MG tablet Take 25 mg by mouth At  Bedtime  56 tablet      eltrombopag (PROMACTA) 50 MG tablet Take 3 tablets (150 mg) by mouth daily Administer on an empty stomach, 1 hour before or 2 hours after a meal. Or as directed 90 tablet 6     menthol-zinc oxide (CALMOSEPTINE) 0.44-20.625 % OINT ointment Apply topically 4 times daily as needed for skin protection 71 g 1     ORDER FOR DME Equipment being ordered: knee high compression stockings- 18-20 mm 1 Box 1     pantoprazole (PROTONIX) 20 MG EC tablet Take 1 tablet (20 mg) by mouth daily 30 tablet 11     psyllium (METAMUCIL) 58.6 % packet Take 1 packet by mouth Every Mon, Wed, Fri Morning       traMADol (ULTRAM) 50 MG tablet Take 1-2 tablets ( mg) by mouth every 8 hours as needed for moderate pain 40 tablet 0     Allergies   Allergen Reactions     Cats      Dogs      Seasonal Allergies      Reviewed and updated as needed this visit by Provider         Review of Systems   ROS COMP: Constitutional, HEENT, cardiovascular, pulmonary, gi and gu systems are negative, except as otherwise noted.      Objective      /62 (BP Location: Left arm, Patient Position: Chair, Cuff Size: Adult Regular)   Pulse 90   Temp 98.7  F (37.1  C) (Tympanic)   Wt 56.2 kg (124 lb)   SpO2 97%   BMI 24.22 kg/m      Physical Exam   GENERAL: healthy, alert and no distress  ABDOMEN: soft, nontender  ,BACK: no CVA tenderness  Diagnostic Test Results:  Labs reviewed in Epic        Assessment & Plan       ICD-10-CM    1. Dysuria R30.0 *UA reflex to Microscopic and Culture ([place] [facility] (except [place] and [place])     Urine Microscopic   2. Nonspecific finding on examination of urine R82.90 Urine Culture Aerobic Bacterial   3. Acute cystitis with hematuria N30.01 sulfamethoxazole-trimethoprim (BACTRIM DS) 800-160 MG tablet     Increase fluid intake         KHADRA Escalante [facility]

## 2019-08-28 ENCOUNTER — PATIENT OUTREACH (OUTPATIENT)
Dept: ONCOLOGY | Facility: CLINIC | Age: 74
End: 2019-08-28

## 2019-08-28 NOTE — PROGRESS NOTES
Oncology RN Care Coordination Note:     Per Sejal Walls PA-C patient should not wait until next Tuesday to get her labs re-checked.      Writer called patient to express this concern.  Patient states she took Tylenol over the weekend and she did not want to get her labs drawn until she had time to recover after taking the Tylenol.     Writer encouraged patient to get her labs checked this week, she states she will get her labs drawn on Friday at [facility].     Karley Cunha RN BSN   [facility]  Nurse Coordinator

## 2019-08-28 NOTE — PROGRESS NOTES
Oncology RN Care Coordination Note:     Patient left writer a voicemail this morning stating she was not going to get her labs done today and she has rescheduled them for Tuesday 9/3/19, writer has updated Sejal Walls PA-C.     Karley Cunha, RN BSN   [facility]  Nurse Coordinator

## 2019-08-29 LAB
BACTERIA SPEC CULT: ABNORMAL
BACTERIA SPEC CULT: ABNORMAL
SPECIMEN SOURCE: ABNORMAL

## 2019-08-30 ENCOUNTER — INFUSION THERAPY VISIT (OUTPATIENT)
Dept: INFUSION THERAPY | Facility: CLINIC | Age: 74
End: 2019-08-30
Attending: INTERNAL MEDICINE
Payer: COMMERCIAL

## 2019-08-30 ENCOUNTER — HOSPITAL ENCOUNTER (OUTPATIENT)
Facility: CLINIC | Age: 74
Setting detail: SPECIMEN
Discharge: HOME | End: 2019-08-30
Attending: INTERNAL MEDICINE | Admitting: PHYSICIAN ASSISTANT
Payer: COMMERCIAL

## 2019-08-30 DIAGNOSIS — D61.818 PANCYTOPENIA (H): ICD-10-CM

## 2019-08-30 DIAGNOSIS — D61.3 IDIOPATHIC APLASTIC ANEMIA (H): ICD-10-CM

## 2019-08-30 LAB
ABO + RH BLD: NORMAL
ABO + RH BLD: NORMAL
ALBUMIN SERPL-MCNC: 3.3 G/DL (ref 3.4–5)
ALP SERPL-CCNC: 162 U/L (ref 40–150)
ALT SERPL W P-5'-P-CCNC: 28 U/L (ref 0–50)
ANION GAP SERPL CALCULATED.3IONS-SCNC: 8 MMOL/L (ref 3–14)
AST SERPL W P-5'-P-CCNC: 15 U/L (ref 0–45)
BASOPHILS # BLD AUTO: 0 10E9/L (ref 0–0.2)
BASOPHILS NFR BLD AUTO: 0 %
BILIRUB SERPL-MCNC: 1.3 MG/DL (ref 0.2–1.3)
BLD GP AB SCN SERPL QL: NORMAL
BLOOD BANK CMNT PATIENT-IMP: NORMAL
BUN SERPL-MCNC: 43 MG/DL (ref 7–30)
CALCIUM SERPL-MCNC: 9.2 MG/DL (ref 8.5–10.1)
CHLORIDE SERPL-SCNC: 109 MMOL/L (ref 94–109)
CO2 SERPL-SCNC: 21 MMOL/L (ref 20–32)
CREAT SERPL-MCNC: 1.87 MG/DL (ref 0.52–1.04)
DIFFERENTIAL METHOD BLD: ABNORMAL
EOSINOPHIL # BLD AUTO: 0.1 10E9/L (ref 0–0.7)
EOSINOPHIL NFR BLD AUTO: 2 %
ERYTHROCYTE [DISTWIDTH] IN BLOOD BY AUTOMATED COUNT: 14.4 % (ref 10–15)
GFR SERPL CREATININE-BSD FRML MDRD: 26 ML/MIN/{1.73_M2}
GLUCOSE SERPL-MCNC: 106 MG/DL (ref 70–99)
HCT VFR BLD AUTO: 26.4 % (ref 35–47)
HGB BLD-MCNC: 8.9 G/DL (ref 11.7–15.7)
IMM GRANULOCYTES # BLD: 0 10E9/L (ref 0–0.4)
IMM GRANULOCYTES NFR BLD: 0.3 %
LYMPHOCYTES # BLD AUTO: 1.3 10E9/L (ref 0.8–5.3)
LYMPHOCYTES NFR BLD AUTO: 32.2 %
MCH RBC QN AUTO: 38.9 PG (ref 26.5–33)
MCHC RBC AUTO-ENTMCNC: 33.7 G/DL (ref 31.5–36.5)
MCV RBC AUTO: 115 FL (ref 78–100)
MONOCYTES # BLD AUTO: 0.2 10E9/L (ref 0–1.3)
MONOCYTES NFR BLD AUTO: 6.1 %
NEUTROPHILS # BLD AUTO: 2.3 10E9/L (ref 1.6–8.3)
NEUTROPHILS NFR BLD AUTO: 59.4 %
NRBC # BLD AUTO: 0 10*3/UL
NRBC BLD AUTO-RTO: 0 /100
PLATELET # BLD AUTO: 72 10E9/L (ref 150–450)
POTASSIUM SERPL-SCNC: 4.4 MMOL/L (ref 3.4–5.3)
PROT SERPL-MCNC: 7.8 G/DL (ref 6.8–8.8)
RBC # BLD AUTO: 2.29 10E12/L (ref 3.8–5.2)
SODIUM SERPL-SCNC: 138 MMOL/L (ref 133–144)
SPECIMEN EXP DATE BLD: NORMAL
WBC # BLD AUTO: 3.9 10E9/L (ref 4–11)

## 2019-08-30 PROCEDURE — 85025 COMPLETE CBC W/AUTO DIFF WBC: CPT | Performed by: PHYSICIAN ASSISTANT

## 2019-08-30 PROCEDURE — 86850 RBC ANTIBODY SCREEN: CPT | Performed by: INTERNAL MEDICINE

## 2019-08-30 PROCEDURE — 36415 COLL VENOUS BLD VENIPUNCTURE: CPT

## 2019-08-30 PROCEDURE — 86901 BLOOD TYPING SEROLOGIC RH(D): CPT | Performed by: INTERNAL MEDICINE

## 2019-08-30 PROCEDURE — 80053 COMPREHEN METABOLIC PANEL: CPT | Performed by: PHYSICIAN ASSISTANT

## 2019-08-30 PROCEDURE — 86900 BLOOD TYPING SEROLOGIC ABO: CPT | Performed by: INTERNAL MEDICINE

## 2019-08-30 NOTE — PROGRESS NOTES
Medical Assistant Note:  Bonny Raymundo presents today for BLOOD DRAW.    Patient seen by provider today: No.   present during visit today: Not Applicable.    Concerns: No Concerns.    Procedure:  Lab draw site: LAC, Needle type: BF, Gauge: 23.    Post Assessment:  Labs drawn without difficulty: Yes.    Discharge Plan:  Departure Mode: Ambulatory.    Face to Face Time: 5 MIN  .    Bianca King, CMA

## 2019-09-16 ENCOUNTER — DOCUMENTATION ONLY (OUTPATIENT)
Dept: ONCOLOGY | Facility: CLINIC | Age: 74
End: 2019-09-16

## 2019-09-16 ENCOUNTER — INFUSION THERAPY VISIT (OUTPATIENT)
Dept: INFUSION THERAPY | Facility: CLINIC | Age: 74
End: 2019-09-16
Attending: PHYSICIAN ASSISTANT
Payer: COMMERCIAL

## 2019-09-16 VITALS
DIASTOLIC BLOOD PRESSURE: 68 MMHG | RESPIRATION RATE: 18 BRPM | TEMPERATURE: 98 F | SYSTOLIC BLOOD PRESSURE: 128 MMHG | HEART RATE: 83 BPM

## 2019-09-16 DIAGNOSIS — M80.00XD OSTEOPOROSIS WITH CURRENT PATHOLOGICAL FRACTURE WITH ROUTINE HEALING, UNSPECIFIED OSTEOPOROSIS TYPE, SUBSEQUENT ENCOUNTER: Primary | ICD-10-CM

## 2019-09-16 PROCEDURE — 25000128 H RX IP 250 OP 636: Performed by: PHYSICIAN ASSISTANT

## 2019-09-16 PROCEDURE — 96372 THER/PROPH/DIAG INJ SC/IM: CPT

## 2019-09-16 RX ADMIN — DENOSUMAB 60 MG: 60 INJECTION SUBCUTANEOUS at 12:58

## 2019-09-16 ASSESSMENT — PAIN SCALES - GENERAL: PAINLEVEL: NO PAIN (0)

## 2019-09-16 NOTE — PROGRESS NOTES
Patient received Prolia today. Her Calcium level was normal at 9.2. Her creatinine was elevated at 1.87. The creatinine clearance is estimated to be 20 mL/min. For patients with decreased creatinine clearance, the  recommends rechecking Calcium 10-14 days after getting Prolia.     The patient has an upcoming lab appnt on 10/3 and that could be used to recheck the calcium if the provider choses to recheck it. I will route the note to Celine Walls to determine if the calcium needs to be rechecked.    Alfred SimsD

## 2019-09-16 NOTE — RESULT ENCOUNTER NOTE
The bactrim was a good choice to fight this infection , Bonny.  I hope is has completely cleared

## 2019-09-16 NOTE — PROGRESS NOTES
Infusion Nursing Note:  Bonny Raymundo presents today for prolia.    Patient seen by provider today: No   present during visit today: Not Applicable.    Note: N/A.    Intravenous Access:  No Intravenous access/labs at this visit.    Treatment Conditions:  Lab Results   Component Value Date     08/30/2019                   Lab Results   Component Value Date    POTASSIUM 4.4 08/30/2019           Lab Results   Component Value Date    MAG 2.1 08/21/2019            Lab Results   Component Value Date    CR 1.87 08/30/2019                   Lab Results   Component Value Date    LEO 9.2 08/30/2019                Lab Results   Component Value Date    BILITOTAL 1.3 08/30/2019           Lab Results   Component Value Date    ALBUMIN 3.3 08/30/2019                    Lab Results   Component Value Date    ALT 28 08/30/2019           Lab Results   Component Value Date    AST 15 08/30/2019       Results reviewed, labs MET treatment parameters, ok to proceed with treatment.      Post Infusion Assessment:  Patient tolerated injection without incident.  Site patent and intact, free from redness, edema or discomfort.       Discharge Plan:   AVS to patient via MYCHART.  Patient will return in 6 months as [facility] for next appointment.   Patient discharged in stable condition accompanied by: self.  Departure Mode: Ambulatory.    James Parikh, RN, RN

## 2019-10-03 ENCOUNTER — APPOINTMENT (OUTPATIENT)
Dept: LAB | Facility: CLINIC | Age: 74
End: 2019-10-03
Attending: INTERNAL MEDICINE
Payer: COMMERCIAL

## 2019-10-03 ENCOUNTER — OFFICE VISIT (OUTPATIENT)
Dept: TRANSPLANT | Facility: CLINIC | Age: 74
End: 2019-10-03
Attending: INTERNAL MEDICINE
Payer: COMMERCIAL

## 2019-10-03 VITALS
HEART RATE: 83 BPM | WEIGHT: 123 LBS | BODY MASS INDEX: 24.15 KG/M2 | SYSTOLIC BLOOD PRESSURE: 144 MMHG | OXYGEN SATURATION: 96 % | DIASTOLIC BLOOD PRESSURE: 79 MMHG | RESPIRATION RATE: 16 BRPM | TEMPERATURE: 97.7 F | HEIGHT: 60 IN

## 2019-10-03 DIAGNOSIS — D61.9 APLASTIC ANEMIA (H): ICD-10-CM

## 2019-10-03 DIAGNOSIS — D61.818 PANCYTOPENIA (H): ICD-10-CM

## 2019-10-03 DIAGNOSIS — D61.3 IDIOPATHIC APLASTIC ANEMIA (H): ICD-10-CM

## 2019-10-03 DIAGNOSIS — K21.9 GASTROESOPHAGEAL REFLUX DISEASE WITHOUT ESOPHAGITIS: ICD-10-CM

## 2019-10-03 LAB
ALBUMIN SERPL-MCNC: 3.4 G/DL (ref 3.4–5)
ALP SERPL-CCNC: 84 U/L (ref 40–150)
ALT SERPL W P-5'-P-CCNC: 15 U/L (ref 0–50)
ANION GAP SERPL CALCULATED.3IONS-SCNC: 6 MMOL/L (ref 3–14)
AST SERPL W P-5'-P-CCNC: 12 U/L (ref 0–45)
BASOPHILS # BLD AUTO: 0 10E9/L (ref 0–0.2)
BASOPHILS NFR BLD AUTO: 0.2 %
BILIRUB SERPL-MCNC: 1.3 MG/DL (ref 0.2–1.3)
BUN SERPL-MCNC: 33 MG/DL (ref 7–30)
CALCIUM SERPL-MCNC: 8.9 MG/DL (ref 8.5–10.1)
CHLORIDE SERPL-SCNC: 110 MMOL/L (ref 94–109)
CO2 SERPL-SCNC: 24 MMOL/L (ref 20–32)
CREAT SERPL-MCNC: 1.25 MG/DL (ref 0.52–1.04)
DIFFERENTIAL METHOD BLD: ABNORMAL
EOSINOPHIL # BLD AUTO: 0.1 10E9/L (ref 0–0.7)
EOSINOPHIL NFR BLD AUTO: 2.2 %
ERYTHROCYTE [DISTWIDTH] IN BLOOD BY AUTOMATED COUNT: 15.4 % (ref 10–15)
GFR SERPL CREATININE-BSD FRML MDRD: 42 ML/MIN/{1.73_M2}
GLUCOSE SERPL-MCNC: 114 MG/DL (ref 70–99)
HCT VFR BLD AUTO: 27.8 % (ref 35–47)
HGB BLD-MCNC: 9.1 G/DL (ref 11.7–15.7)
IMM GRANULOCYTES # BLD: 0 10E9/L (ref 0–0.4)
IMM GRANULOCYTES NFR BLD: 0.5 %
LYMPHOCYTES # BLD AUTO: 1.6 10E9/L (ref 0.8–5.3)
LYMPHOCYTES NFR BLD AUTO: 39.2 %
MCH RBC QN AUTO: 39.4 PG (ref 26.5–33)
MCHC RBC AUTO-ENTMCNC: 32.7 G/DL (ref 31.5–36.5)
MCV RBC AUTO: 120 FL (ref 78–100)
MONOCYTES # BLD AUTO: 0.4 10E9/L (ref 0–1.3)
MONOCYTES NFR BLD AUTO: 9 %
NEUTROPHILS # BLD AUTO: 2 10E9/L (ref 1.6–8.3)
NEUTROPHILS NFR BLD AUTO: 48.9 %
NRBC # BLD AUTO: 0 10*3/UL
NRBC BLD AUTO-RTO: 0 /100
PLATELET # BLD AUTO: 73 10E9/L (ref 150–450)
POTASSIUM SERPL-SCNC: 3.9 MMOL/L (ref 3.4–5.3)
PROT SERPL-MCNC: 7.5 G/DL (ref 6.8–8.8)
RBC # BLD AUTO: 2.31 10E12/L (ref 3.8–5.2)
SODIUM SERPL-SCNC: 140 MMOL/L (ref 133–144)
WBC # BLD AUTO: 4 10E9/L (ref 4–11)

## 2019-10-03 PROCEDURE — 85025 COMPLETE CBC W/AUTO DIFF WBC: CPT | Performed by: PHYSICIAN ASSISTANT

## 2019-10-03 PROCEDURE — 36415 COLL VENOUS BLD VENIPUNCTURE: CPT

## 2019-10-03 PROCEDURE — G0463 HOSPITAL OUTPT CLINIC VISIT: HCPCS | Mod: ZF

## 2019-10-03 PROCEDURE — 80053 COMPREHEN METABOLIC PANEL: CPT | Performed by: PHYSICIAN ASSISTANT

## 2019-10-03 RX ORDER — PANTOPRAZOLE SODIUM 20 MG/1
20 TABLET, DELAYED RELEASE ORAL DAILY
Qty: 90 TABLET | Refills: 11 | Status: SHIPPED | OUTPATIENT
Start: 2019-10-03 | End: 2020-01-16

## 2019-10-03 RX ORDER — ACETAMINOPHEN 325 MG/1
325 TABLET ORAL EVERY 6 HOURS PRN
Status: ON HOLD | COMMUNITY
Start: 2019-10-03 | End: 2021-01-10

## 2019-10-03 ASSESSMENT — PAIN SCALES - GENERAL: PAINLEVEL: NO PAIN (0)

## 2019-10-03 ASSESSMENT — MIFFLIN-ST. JEOR: SCORE: 974.42

## 2019-10-03 NOTE — PROGRESS NOTES
BP (!) 144/79 (BP Location: Right arm, Patient Position: Sitting, Cuff Size: Adult Regular)   Pulse 83   Temp 97.7  F (36.5  C) (Oral)   Resp 16   Ht 1.524 m (5')   Wt 55.8 kg (123 lb)   SpO2 96%   BMI 24.02 kg/m     Wt Readings from Last 4 Encounters:   10/03/19 55.8 kg (123 lb)   08/27/19 56.2 kg (124 lb)   08/21/19 56.1 kg (123 lb 9.6 oz)   07/11/19 57 kg (125 lb 9.6 oz)   Bonny returns to follow-up her aplastic anemia.  She continues on cyclosporine and eltrombopag.  The reduced doses of cyclosporine allowed her renal function to return to an acceptable level.  She had transient liver abnormalities in August, of somewhat uncertain etiology, perhaps from too much acetaminophen.  They have returned to normal as well.  We discussed the importance of not taking more than 2 g of acetaminophen per day but she is uncertain what strength her pills are: whether they are 325 or 500 mg.  She will look at the bottle she has.    She has had no recent fever chills infection bleeding bruising.  She has her ongoing episodic sore back but it has not changed.  She has no paresthesias but she has a very fine subtle resting tremor which she did not say she was aware of until I asked about it.  She has had no other new GI  or respiratory symptoms.  Her review of systems otherwise unrevealing.    Her exam shows her weight steady over the last 4 months and her vital signs were acceptable though her blood pressure was elevated a bit on arrival but the last few visits is been acceptable.  She has no cervical supraclavicular or axillary lymphadenopathy.  Her oropharynx is clear without mucosal changes.  Her lungs were clear.  She had no sinus tenderness.  Her heart tones are regular without a gallop.  There was no abdominal tenderness masses or rebound.  She had no hepatosplenomegaly.  She had a very subtle fine tremor but otherwise normal deep tendon reflexes and good  strength.    Her laboratory testing showed good stable  blood counts though her platelets are still low and her renal function was better with a creatinine down to 1.25.    We will make no change in her medications and she will continue cyclosporine 75 mg twice a day and eltrombopag 150 mg daily with her other medications and a clear limit of acetaminophen maximum of 2 g/day.    Her questions were answered in full and we discussed her sense of a weak voice.    I reviewed the normal laryngoscopy she had at her ENT physician in August.  She is likely to plan a hearing aid evaluation after the first of the year    Will have her return for recheck in 2 months time and I will see her in 4 months.    Rafita Rogel MD    Professor of medicine    Results for LISSETH PARIKH (MRN [id number]) as of 10/3/2019 17:53   Ref. Range 8/30/2019 11:00 10/3/2019 14:48   Sodium Latest Ref Range: 133 - 144 mmol/L 138 140   Potassium Latest Ref Range: 3.4 - 5.3 mmol/L 4.4 3.9   Chloride Latest Ref Range: 94 - 109 mmol/L 109 110 (H)   Carbon Dioxide Latest Ref Range: 20 - 32 mmol/L 21 24   Urea Nitrogen Latest Ref Range: 7 - 30 mg/dL 43 (H) 33 (H)   Creatinine Latest Ref Range: 0.52 - 1.04 mg/dL 1.87 (H) 1.25 (H)   GFR Estimate Latest Ref Range: >60 mL/min/1.73_m2 26 (L) 42 (L)   GFR Estimate If Black Latest Ref Range: >60 mL/min/1.73_m2 30 (L) 48 (L)   Calcium Latest Ref Range: 8.5 - 10.1 mg/dL 9.2 8.9   Anion Gap Latest Ref Range: 3 - 14 mmol/L 8 6   Albumin Latest Ref Range: 3.4 - 5.0 g/dL 3.3 (L) 3.4   Protein Total Latest Ref Range: 6.8 - 8.8 g/dL 7.8 7.5   Bilirubin Total Latest Ref Range: 0.2 - 1.3 mg/dL 1.3 1.3   Alkaline Phosphatase Latest Ref Range: 40 - 150 U/L 162 (H) 84   ALT Latest Ref Range: 0 - 50 U/L 28 15   AST Latest Ref Range: 0 - 45 U/L 15 12   Glucose Latest Ref Range: 70 - 99 mg/dL 106 (H) 114 (H)   WBC Latest Ref Range: 4.0 - 11.0 10e9/L 3.9 (L) 4.0   Hemoglobin Latest Ref Range: 11.7 - 15.7 g/dL 8.9 (L) 9.1 (L)   Hematocrit Latest Ref Range: 35.0 - 47.0 % 26.4 (L)  27.8 (L)   Platelet Count Latest Ref Range: 150 - 450 10e9/L 72 (L) 73 (L)   RBC Count Latest Ref Range: 3.8 - 5.2 10e12/L 2.29 (L) 2.31 (L)   MCV Latest Ref Range: 78 - 100 fl 115 (H) 120 (H)   MCH Latest Ref Range: 26.5 - 33.0 pg 38.9 (H) 39.4 (H)   MCHC Latest Ref Range: 31.5 - 36.5 g/dL 33.7 32.7   RDW Latest Ref Range: 10.0 - 15.0 % 14.4 15.4 (H)   Diff Method Unknown Automated Method Automated Method   % Neutrophils Latest Units: % 59.4 48.9   % Lymphocytes Latest Units: % 32.2 39.2   % Monocytes Latest Units: % 6.1 9.0   % Eosinophils Latest Units: % 2.0 2.2   % Basophils Latest Units: % 0.0 0.2   % Immature Granulocytes Latest Units: % 0.3 0.5   Nucleated RBCs Latest Ref Range: 0 /100 0 0   Absolute Neutrophil Latest Ref Range: 1.6 - 8.3 10e9/L 2.3 2.0   Absolute Lymphocytes Latest Ref Range: 0.8 - 5.3 10e9/L 1.3 1.6   Absolute Monocytes Latest Ref Range: 0.0 - 1.3 10e9/L 0.2 0.4   Absolute Eosinophils Latest Ref Range: 0.0 - 0.7 10e9/L 0.1 0.1   Absolute Basophils Latest Ref Range: 0.0 - 0.2 10e9/L 0.0 0.0   Abs Immature Granulocytes Latest Ref Range: 0 - 0.4 10e9/L 0.0 0.0   Absolute Nucleated RBC Unknown 0.0 0.0   ABO Unknown A    RH(D) Unknown Pos    Antibody Screen Unknown Neg    Test Valid Only At Latest Units:     [facility]    Specimen Expires Unknown 09/02/2019

## 2019-10-03 NOTE — NURSING NOTE
Chief Complaint   Patient presents with     Labs Only     venipuncture, vitals checked     Danette Centeno RN on 10/3/2019 at 2:55 PM

## 2019-10-03 NOTE — NURSING NOTE
Oncology Rooming Note    October 3, 2019 3:15 PM   Bonny Raymundo is a 73 year old female who presents for:    Chief Complaint   Patient presents with     Labs Only     venipuncture, vitals checked     Oncology Clinic Visit     Return visit related to Aplastic Anemia     Initial Vitals: BP (!) 144/79 (BP Location: Right arm, Patient Position: Sitting, Cuff Size: Adult Regular)   Pulse 83   Temp 97.7  F (36.5  C) (Oral)   Resp 16   Ht 1.524 m (5')   Wt 55.8 kg (123 lb)   SpO2 96%   BMI 24.02 kg/m   Estimated body mass index is 24.02 kg/m  as calculated from the following:    Height as of this encounter: 1.524 m (5').    Weight as of this encounter: 55.8 kg (123 lb). Body surface area is 1.54 meters squared.  No Pain (0) Comment: Data Unavailable   No LMP recorded. Patient has had a hysterectomy.  Allergies reviewed: Yes  Medications reviewed: Yes    Medications: Medication refills not needed today.  Pharmacy name entered into [facility]:    [facility], [place] - [address]  [facility], [place] - [address] [facility]  [facility], [place] - [address] [place] [address]  WRITTEN PRESCRIPTION REQUESTED  [id number] - [place], [place] - [address]    Clinical concerns: No new concerns. Provider was notified.      Sydnee Patel LPN

## 2019-10-03 NOTE — LETTER
10/3/2019      RE: Bonny Raymundo  [address]  [facility] [place] [address]       BP (!) 144/79 (BP Location: Right arm, Patient Position: Sitting, Cuff Size: Adult Regular)   Pulse 83   Temp 97.7  F (36.5  C) (Oral)   Resp 16   Ht 1.524 m (5')   Wt 55.8 kg (123 lb)   SpO2 96%   BMI 24.02 kg/m      Wt Readings from Last 4 Encounters:   10/03/19 55.8 kg (123 lb)   08/27/19 56.2 kg (124 lb)   08/21/19 56.1 kg (123 lb 9.6 oz)   07/11/19 57 kg (125 lb 9.6 oz)   Bonny returns to follow-up her aplastic anemia.  She continues on cyclosporine and eltrombopag.  The reduced doses of cyclosporine allowed her renal function to return to an acceptable level.  She had transient liver abnormalities in August, of somewhat uncertain etiology, perhaps from too much acetaminophen.  They have returned to normal as well.  We discussed the importance of not taking more than 2 g of acetaminophen per day but she is uncertain what strength her pills are: whether they are 325 or 500 mg.  She will look at the bottle she has.    She has had no recent fever chills infection bleeding bruising.  She has her ongoing episodic sore back but it has not changed.  She has no paresthesias but she has a very fine subtle resting tremor which she did not say she was aware of until I asked about it.  She has had no other new GI  or respiratory symptoms.  Her review of systems otherwise unrevealing.    Her exam shows her weight steady over the last 4 months and her vital signs were acceptable though her blood pressure was elevated a bit on arrival but the last few visits is been acceptable.  She has no cervical supraclavicular or axillary lymphadenopathy.  Her oropharynx is clear without mucosal changes.  Her lungs were clear.  She had no sinus tenderness.  Her heart tones are regular without a gallop.  There was no abdominal tenderness masses or rebound.  She had no hepatosplenomegaly.  She had a very subtle fine tremor but otherwise normal  deep tendon reflexes and good  strength.    Her laboratory testing showed good stable blood counts though her platelets are still low and her renal function was better with a creatinine down to 1.25.    We will make no change in her medications and she will continue cyclosporine 75 mg twice a day and eltrombopag 150 mg daily with her other medications and a clear limit of acetaminophen maximum of 2 g/day.    Her questions were answered in full and we discussed her sense of a weak voice.    I reviewed the normal laryngoscopy she had at her ENT physician in August.  She is likely to plan a hearing aid evaluation after the first of the year    Will have her return for recheck in 2 months time and I will see her in 4 months.    Rafita Rogel MD    Professor of medicine    Results for LISSETH PARIKH (MRN [id number]) as of 10/3/2019 17:53   Ref. Range 8/30/2019 11:00 10/3/2019 14:48   Sodium Latest Ref Range: 133 - 144 mmol/L 138 140   Potassium Latest Ref Range: 3.4 - 5.3 mmol/L 4.4 3.9   Chloride Latest Ref Range: 94 - 109 mmol/L 109 110 (H)   Carbon Dioxide Latest Ref Range: 20 - 32 mmol/L 21 24   Urea Nitrogen Latest Ref Range: 7 - 30 mg/dL 43 (H) 33 (H)   Creatinine Latest Ref Range: 0.52 - 1.04 mg/dL 1.87 (H) 1.25 (H)   GFR Estimate Latest Ref Range: >60 mL/min/1.73_m2 26 (L) 42 (L)   GFR Estimate If Black Latest Ref Range: >60 mL/min/1.73_m2 30 (L) 48 (L)   Calcium Latest Ref Range: 8.5 - 10.1 mg/dL 9.2 8.9   Anion Gap Latest Ref Range: 3 - 14 mmol/L 8 6   Albumin Latest Ref Range: 3.4 - 5.0 g/dL 3.3 (L) 3.4   Protein Total Latest Ref Range: 6.8 - 8.8 g/dL 7.8 7.5   Bilirubin Total Latest Ref Range: 0.2 - 1.3 mg/dL 1.3 1.3   Alkaline Phosphatase Latest Ref Range: 40 - 150 U/L 162 (H) 84   ALT Latest Ref Range: 0 - 50 U/L 28 15   AST Latest Ref Range: 0 - 45 U/L 15 12   Glucose Latest Ref Range: 70 - 99 mg/dL 106 (H) 114 (H)   WBC Latest Ref Range: 4.0 - 11.0 10e9/L 3.9 (L) 4.0   Hemoglobin Latest Ref Range:  11.7 - 15.7 g/dL 8.9 (L) 9.1 (L)   Hematocrit Latest Ref Range: 35.0 - 47.0 % 26.4 (L) 27.8 (L)   Platelet Count Latest Ref Range: 150 - 450 10e9/L 72 (L) 73 (L)   RBC Count Latest Ref Range: 3.8 - 5.2 10e12/L 2.29 (L) 2.31 (L)   MCV Latest Ref Range: 78 - 100 fl 115 (H) 120 (H)   MCH Latest Ref Range: 26.5 - 33.0 pg 38.9 (H) 39.4 (H)   MCHC Latest Ref Range: 31.5 - 36.5 g/dL 33.7 32.7   RDW Latest Ref Range: 10.0 - 15.0 % 14.4 15.4 (H)   Diff Method Unknown Automated Method Automated Method   % Neutrophils Latest Units: % 59.4 48.9   % Lymphocytes Latest Units: % 32.2 39.2   % Monocytes Latest Units: % 6.1 9.0   % Eosinophils Latest Units: % 2.0 2.2   % Basophils Latest Units: % 0.0 0.2   % Immature Granulocytes Latest Units: % 0.3 0.5   Nucleated RBCs Latest Ref Range: 0 /100 0 0   Absolute Neutrophil Latest Ref Range: 1.6 - 8.3 10e9/L 2.3 2.0   Absolute Lymphocytes Latest Ref Range: 0.8 - 5.3 10e9/L 1.3 1.6   Absolute Monocytes Latest Ref Range: 0.0 - 1.3 10e9/L 0.2 0.4   Absolute Eosinophils Latest Ref Range: 0.0 - 0.7 10e9/L 0.1 0.1   Absolute Basophils Latest Ref Range: 0.0 - 0.2 10e9/L 0.0 0.0   Abs Immature Granulocytes Latest Ref Range: 0 - 0.4 10e9/L 0.0 0.0   Absolute Nucleated RBC Unknown 0.0 0.0   ABO Unknown A    RH(D) Unknown Pos    Antibody Screen Unknown Neg    Test Valid Only At Latest Units:     [facility]    Specimen Expires Unknown 09/02/2019        Rafita Rogel MD

## 2019-11-02 DIAGNOSIS — D61.3 IDIOPATHIC APLASTIC ANEMIA (H): ICD-10-CM

## 2019-11-02 DIAGNOSIS — D61.9 APLASTIC ANEMIA (H): ICD-10-CM

## 2019-11-02 DIAGNOSIS — D61.818 PANCYTOPENIA (H): ICD-10-CM

## 2019-11-04 RX ORDER — CYCLOSPORINE 25 MG/1
75 CAPSULE, LIQUID FILLED ORAL 2 TIMES DAILY
Qty: 540 CAPSULE | Refills: 2 | Status: SHIPPED | OUTPATIENT
Start: 2019-11-04 | End: 2020-05-14

## 2019-11-20 ENCOUNTER — APPOINTMENT (OUTPATIENT)
Dept: LAB | Facility: CLINIC | Age: 74
End: 2019-11-20
Attending: PHYSICIAN ASSISTANT
Payer: COMMERCIAL

## 2019-11-20 ENCOUNTER — ONCOLOGY VISIT (OUTPATIENT)
Dept: ONCOLOGY | Facility: CLINIC | Age: 74
End: 2019-11-20
Attending: PHYSICIAN ASSISTANT
Payer: COMMERCIAL

## 2019-11-20 VITALS
DIASTOLIC BLOOD PRESSURE: 88 MMHG | RESPIRATION RATE: 18 BRPM | TEMPERATURE: 97.7 F | OXYGEN SATURATION: 98 % | SYSTOLIC BLOOD PRESSURE: 166 MMHG | WEIGHT: 124.1 LBS | HEIGHT: 60 IN | HEART RATE: 76 BPM | BODY MASS INDEX: 24.36 KG/M2

## 2019-11-20 DIAGNOSIS — D61.818 PANCYTOPENIA (H): ICD-10-CM

## 2019-11-20 DIAGNOSIS — Z23 NEED FOR PROPHYLACTIC VACCINATION AND INOCULATION AGAINST INFLUENZA: Primary | ICD-10-CM

## 2019-11-20 DIAGNOSIS — D61.3 IDIOPATHIC APLASTIC ANEMIA (H): ICD-10-CM

## 2019-11-20 LAB
ALBUMIN SERPL-MCNC: 3.4 G/DL (ref 3.4–5)
ALP SERPL-CCNC: 60 U/L (ref 40–150)
ALT SERPL W P-5'-P-CCNC: 16 U/L (ref 0–50)
ANION GAP SERPL CALCULATED.3IONS-SCNC: 7 MMOL/L (ref 3–14)
AST SERPL W P-5'-P-CCNC: 9 U/L (ref 0–45)
BASOPHILS # BLD AUTO: 0 10E9/L (ref 0–0.2)
BASOPHILS NFR BLD AUTO: 0 %
BILIRUB SERPL-MCNC: 1.1 MG/DL (ref 0.2–1.3)
BUN SERPL-MCNC: 36 MG/DL (ref 7–30)
CALCIUM SERPL-MCNC: 8.6 MG/DL (ref 8.5–10.1)
CHLORIDE SERPL-SCNC: 113 MMOL/L (ref 94–109)
CO2 SERPL-SCNC: 21 MMOL/L (ref 20–32)
CREAT SERPL-MCNC: 1.36 MG/DL (ref 0.52–1.04)
DIFFERENTIAL METHOD BLD: ABNORMAL
EOSINOPHIL # BLD AUTO: 0.1 10E9/L (ref 0–0.7)
EOSINOPHIL NFR BLD AUTO: 2.9 %
ERYTHROCYTE [DISTWIDTH] IN BLOOD BY AUTOMATED COUNT: 14.7 % (ref 10–15)
GFR SERPL CREATININE-BSD FRML MDRD: 38 ML/MIN/{1.73_M2}
GLUCOSE SERPL-MCNC: 88 MG/DL (ref 70–99)
HCT VFR BLD AUTO: 28.3 % (ref 35–47)
HGB BLD-MCNC: 9 G/DL (ref 11.7–15.7)
IMM GRANULOCYTES # BLD: 0 10E9/L (ref 0–0.4)
IMM GRANULOCYTES NFR BLD: 0.3 %
LYMPHOCYTES # BLD AUTO: 1.2 10E9/L (ref 0.8–5.3)
LYMPHOCYTES NFR BLD AUTO: 34.3 %
MCH RBC QN AUTO: 38.1 PG (ref 26.5–33)
MCHC RBC AUTO-ENTMCNC: 31.8 G/DL (ref 31.5–36.5)
MCV RBC AUTO: 120 FL (ref 78–100)
MONOCYTES # BLD AUTO: 0.4 10E9/L (ref 0–1.3)
MONOCYTES NFR BLD AUTO: 10.7 %
NEUTROPHILS # BLD AUTO: 1.8 10E9/L (ref 1.6–8.3)
NEUTROPHILS NFR BLD AUTO: 51.8 %
NRBC # BLD AUTO: 0 10*3/UL
NRBC BLD AUTO-RTO: 0 /100
PLATELET # BLD AUTO: 90 10E9/L (ref 150–450)
POTASSIUM SERPL-SCNC: 4.6 MMOL/L (ref 3.4–5.3)
PROT SERPL-MCNC: 7.4 G/DL (ref 6.8–8.8)
RBC # BLD AUTO: 2.36 10E12/L (ref 3.8–5.2)
SODIUM SERPL-SCNC: 142 MMOL/L (ref 133–144)
WBC # BLD AUTO: 3.5 10E9/L (ref 4–11)

## 2019-11-20 PROCEDURE — 90662 IIV NO PRSV INCREASED AG IM: CPT | Mod: ZF | Performed by: INTERNAL MEDICINE

## 2019-11-20 PROCEDURE — 80053 COMPREHEN METABOLIC PANEL: CPT | Performed by: INTERNAL MEDICINE

## 2019-11-20 PROCEDURE — 36415 COLL VENOUS BLD VENIPUNCTURE: CPT

## 2019-11-20 PROCEDURE — 25000128 H RX IP 250 OP 636: Mod: ZF | Performed by: INTERNAL MEDICINE

## 2019-11-20 PROCEDURE — G0463 HOSPITAL OUTPT CLINIC VISIT: HCPCS | Mod: 25,ZF

## 2019-11-20 PROCEDURE — 99214 OFFICE O/P EST MOD 30 MIN: CPT | Mod: ZP | Performed by: PHYSICIAN ASSISTANT

## 2019-11-20 PROCEDURE — 85025 COMPLETE CBC W/AUTO DIFF WBC: CPT | Performed by: INTERNAL MEDICINE

## 2019-11-20 PROCEDURE — G0008 ADMIN INFLUENZA VIRUS VAC: HCPCS

## 2019-11-20 RX ADMIN — INFLUENZA A VIRUS A/MICHIGAN/45/2015 X-275 (H1N1) ANTIGEN (FORMALDEHYDE INACTIVATED), INFLUENZA A VIRUS A/SINGAPORE/INFIMH-16-0019/2016 IVR-186 (H3N2) ANTIGEN (FORMALDEHYDE INACTIVATED), AND INFLUENZA B VIRUS B/MARYLAND/15/2016 BX-69A (A B/COLORADO/6/2017-LIKE VIRUS) ANTIGEN (FORMALDEHYDE INACTIVATED) 0.5 ML: 60; 60; 60 INJECTION, SUSPENSION INTRAMUSCULAR at 12:36

## 2019-11-20 ASSESSMENT — PAIN SCALES - GENERAL: PAINLEVEL: NO PAIN (0)

## 2019-11-20 ASSESSMENT — MIFFLIN-ST. JEOR: SCORE: 979.41

## 2019-11-20 NOTE — NURSING NOTE
Influenza vaccination give to patient in left arm.  Patient was instructed to wait 15 minutes after injection.    Patient tolerated injection, see KEN Burger CMA (AAMA)

## 2019-11-20 NOTE — NURSING NOTE
Oncology Rooming Note    November 20, 2019 11:51 AM   Bonny Raymundo is a 73 year old female who presents for:    Chief Complaint   Patient presents with     Blood Draw     labs drawn via vpt by rn. vs taken      Oncology Clinic Visit     Return; Aplastic Anemia     Initial Vitals: BP (!) 166/88   Pulse 76   Temp 97.7  F (36.5  C) (Oral)   Resp 18   Ht 1.524 m (5')   Wt 56.3 kg (124 lb 1.6 oz)   SpO2 98%   BMI 24.24 kg/m   Estimated body mass index is 24.24 kg/m  as calculated from the following:    Height as of this encounter: 1.524 m (5').    Weight as of this encounter: 56.3 kg (124 lb 1.6 oz). Body surface area is 1.54 meters squared.  No Pain (0) Comment: Data Unavailable   No LMP recorded. Patient has had a hysterectomy.  Allergies reviewed: Yes  Medications reviewed: Yes    Medications: Medication refills not needed today.  Pharmacy name entered into [facility]:    [facility], [place] - [address]  [facility] [place] - [address] [facility] [address]  [facility], [place] - [address]  WRITTEN PRESCRIPTION REQUESTED  [id number] - [place], [place] - [address]    Clinical concerns: No new concerns        Shira Arnold CMA

## 2019-11-20 NOTE — PROGRESS NOTES
[facility] CANCER CLINIC  FOLLOW-UP VISIT NOTE  Date of visit: Nov 20, 2019            REASON FOR VISIT: Aplastic anemia, routine 8 week follow up    HPI: Bonny is a 73 year old female who presented in the fall of 2016 with fatigue and shortness of breath. 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  Her counts showed pancytopenia and BMBX was concerning for aplastic anemia.  By December of 2016 she was transfusion dependent with platelets under 10 k and ANC dropped under 500. 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  She has met with Dr Mcdaniel at [facility] and consulted with Dr Rogel at [facility].      After further consultation it was decided to admit on 1/9/17 for ATG/cyclosporine/prednisone therapy.     The following was her inpatient regimen:  Day 1= 1/9/17  ATG 2500 mg (40 mg/kg0 q24 hours D-4)  Cyclosporine 200 mg (3 mg/kg) PO bid  Eltrombopag 50 mg daily  Methylpred 31 mg (0.5 mg/kg) q24 hours D1-4  Prednisone 60 mg (1 mg/kg) daily after completion of IV methylpred to continue for at least 2 weeks      INTERVAL HISTORY: Bonny is here today for her q8w follow up. Bonny seems to be doing really well.  She is walking her dog and active running erands.  She feels less exhausted after her walks and more energy so she was hoping her hemoglobin was going to be better.  She continues to have tinnitus and is getting hearing aides in January.  She denies any nausea/vomiting or GERD.  No fevers, chest pain and and no bleeding. Daily BM, has been dealing with some looser stools, seems to be improving with fiber, no black/blood. No  issues. Her back pain is really mild at this time.  Only taking 2 tylenol in AM 2 later, total 4/day.    ROS: complete review of systems was performed which was negative unless noted above.    EXAM:  BP (!) 166/88   Pulse 76   Temp 97.7  F (36.5  C) (Oral)   Resp 18   Ht 1.524 m (5')   Wt 56.3 kg (124 lb  1.6 oz)   SpO2 98%   BMI 24.24 kg/m    Wt Readings from Last 4 Encounters:   11/20/19 56.3 kg (124 lb 1.6 oz)   10/03/19 55.8 kg (123 lb)   08/27/19 56.2 kg (124 lb)   08/21/19 56.1 kg (123 lb 9.6 oz)     General: Patient alert and oriented x3.   EYES: PERRLA, conjunctiva pink  Neck: No palpable cervical, supraclavicular or axillary nodes bilaterally.   Cardiovascular: RRR, no murmurs, gallops or rubs  Respiratory: clear to auscultation bilaterally;  no crackles, rhonchi or wheezing.   Abdomen: positive bowel sounds in all four quadrants, soft, nontender.  No palpable liver  Neuro: grossly intact.   Mood and affect is stable.       LABS:      8/30/2019 11:00 10/3/2019 14:48 11/20/2019 11:38   WBC 3.9 (L) 4.0 3.5 (L)   Hemoglobin 8.9 (L) 9.1 (L) 9.0 (L)   Hematocrit 26.4 (L) 27.8 (L) 28.3 (L)   Platelet Count 72 (L) 73 (L) 90 (L)   RBC Count 2.29 (L) 2.31 (L) 2.36 (L)    (H) 120 (H) 120 (H)   MCH 38.9 (H) 39.4 (H) 38.1 (H)   MCHC 33.7 32.7 31.8   RDW 14.4 15.4 (H) 14.7   Diff Method Automated Method Automated Method Automated Method   % Neutrophils 59.4 48.9 51.8   % Lymphocytes 32.2 39.2 34.3   % Monocytes 6.1 9.0 10.7   % Eosinophils 2.0 2.2 2.9   % Basophils 0.0 0.2 0.0   % Immature Granulocytes 0.3 0.5 0.3   Nucleated RBCs 0 0 0   Absolute Neutrophil 2.3 2.0 1.8     Results for LISSETH PARIKH (MRN [id number]) as of 11/20/2019 18:41   10/3/2019 14:48 11/20/2019 11:38   Sodium 140 142   Potassium 3.9 4.6   Chloride 110 (H) 113 (H)   Carbon Dioxide 24 21   Urea Nitrogen 33 (H) 36 (H)   Creatinine 1.25 (H) 1.36 (H)   GFR Estimate 42 (L) 38 (L)   GFR Estimate If Black 48 (L) 44 (L)   Calcium 8.9 8.6   Anion Gap 6 7   Albumin 3.4 3.4   Protein Total 7.5 7.4   Bilirubin Total 1.3 1.1   Alkaline Phosphatase 84 60   ALT 15 16   AST 12 9     ASSESSMENT/PLAN: 73 year old female with AA, now has been transfusion INDEPENDENT since April 2017.    HEME- Treatment for AA 1/9-1/13/17 with ATG, methylpred, cyclosporine  and eltrombopag. She has been independent from transfusions since April of 2017. Her CSA levels have been subtherapeutic however she developed renal toxicity with higher doses so we are keeping her at the following doses:  -cyclosporine 75 mg BID  -eltrombopag 150 mg daily  -counts  q6-8w    LFT elevation: resolved was thought to be 2/2 to too much tylenol.  Ibrahima back pain stable on 2 tylenol BID and LFTs normal    Renal: Creat is mildly elevated today, stable.    HEME-  -Platelets stable at 90k.  Hgb stable at 9.0.   -two prior provoked DVT in 2012 and 2014 with travel. 2012 was associated with PE. Was on warfarin/lovenox in the past. Will keep off ASA until platelets improve more.      ID-  No fever or symptoms of infection.   No ppx medications, CMV was neg.    Completed pentamidine in 2017.    MSK- low back -Low thoracic mid spine pain which is stable. Has h/o compression fractures and has osteoporosis.     -s/p Prolia in October 2018.  Every 6 months for age related osteoporosis-had last in 9/2019    FEN: bobby improved, weight stable.      GI: has GERD, cont Protonix 20 mg.     Health maintenance: sees pcp routinely.  Thyroid biopsy, colonoscopy--WNL.  s/p Shingrix.    -flu shot today     Sejal Walls PA-C

## 2019-11-20 NOTE — NURSING NOTE
Chief Complaint   Patient presents with     Blood Draw     labs drawn via vpt by rn. vs taken      Blood drawn via vpt by RN in lab. VS taken. Pt checked into next appointment.   Fanta Solano RN

## 2019-12-12 ENCOUNTER — TELEPHONE (OUTPATIENT)
Dept: ONCOLOGY | Facility: CLINIC | Age: 74
End: 2019-12-12

## 2019-12-12 NOTE — TELEPHONE ENCOUNTER
This writer called patient to inform her that [employer] free drug re-enrollment forms have been sent in. Left message on [employer] with call back number if any questions.    [facility]   Oncology Pharmacy Liaison  [email or web address]   [phone] (phone)   [phone] (fax)

## 2020-01-02 ENCOUNTER — OFFICE VISIT (OUTPATIENT)
Dept: AUDIOLOGY | Facility: CLINIC | Age: 75
End: 2020-01-02
Payer: COMMERCIAL

## 2020-01-02 DIAGNOSIS — H90.3 SENSORINEURAL HEARING LOSS, BILATERAL: Primary | ICD-10-CM

## 2020-01-02 NOTE — PROGRESS NOTES
AUDIOLOGY REPORT    SUBJECTIVE: Bonny Raymundo is a 74 year old female who was seen in the Audiology Clinic at [facility] on 1/2/2020 to discuss concerns with hearing and functional communication difficulties. An Advance Beneficiary Notice of Noncoverage was signed prior to the start of today's appointment. The patient has been [facility] previously [facility] on 8/8/2019, and results revealed mild sloping to profound sensorineural hearing loss bilaterally. Bonny notes difficulty with communication in a variety of listening situations.     OBJECTIVE: The patient is a hearing aid candidate. The patient would like to move forward with a hearing aid evaluation today. Therefore, the patient was presented with different options for amplification to help aid in communication. Discussed styles, levels of technology, iPhone compatibility, and rechargeable options.     The hearing aids mutually chosen were:  BILATERAL: Widex Evoke 330-Z Fusion  COLOR: Summer gold  BATTERY SIZE: Rechargeable/Size 312  EARMOLD/TIPS: Small power domes (Note: The patient would like to pursue this option first but understands we may need to switch to custom earmolds if targets cannot be met/feedback is an issue)  CANAL/ LENGTH: 1    ASSESSMENT: Reviewed purchase information and warranty information with the patient. The 45 day trial period was explained to the patient. The patient was given a copy of the [place] Department of Health consumer brochure on purchasing hearing instruments. Patient risk factors have been provided to the patient in writing prior to the sale of the hearing aid per FDA regulation. The risk factors are also available in the User Instructional Booklet to be presented on the day of the hearing aid fitting. Hearing aids ordered. Hearing aid evaluation completed.    PLAN: Bonny is scheduled to return in 2-3 weeks for a hearing aid fitting and  programming. Purchase agreement will be completed on that date. Please contact this clinic with any questions or concerns.      Arjun Moody, [facility]  Licensed Audiologist  [place] #[id number]

## 2020-01-05 NOTE — PROGRESS NOTES
/70 mmHg  Pulse 83  Resp 16  Wt 60.328 kg (133 lb)  SpO2 95%  Wt Readings from Last 4 Encounters:   02/02/17 60.328 kg (133 lb)   01/26/17 60.3 kg (132 lb 15 oz)   01/25/17 60.737 kg (133 lb 14.4 oz)   01/17/17 61.598 kg (135 lb 12.8 oz)     Severe Aplastic anemia  Had hemorrhoidal or LGI bleeding 2 weeks ago. No bleeding since. Getting plts q2-3 days.   Post ATG/CSA/Steroids/Eltrombopag for aplasia therpay early January.    No bleeding noted now. No nosebleed or  GI bleeding.  Easy brusing.  No fcs. No pain.  Eating ok ; mild constipation    PHYSICAL EXAMINATION:   VITAL SIGNS:  Her exam shows acceptable vital signs.   LYMPH:  She has no Supra clavic or cervical lymphadenopathy.   EXTREMITIES:  She has no peripheral edema.   SKIN:  No conjunctival or LE petech but notable non palp bruises  HEENT:  Her oropharynx is clear without mucosal lesions.   LUNGS:  Clear.   CARDIOVASCULAR:  Heart tones are regular with a soft ejection murmur.   ABDOMEN:  Soft and nontender without hepatosplenomegaly or masses.      Labs;  Chem ok  CBC with lower plts; slowly falling Hb   200-400 PMNs last few weeks    IMpr;  Taking ongoing immunosuppression for aplasia   BID, pred reduced today to 50mg/d; eltrombopag 150 mg/day    Cont and monitor csa levels targetting trough level 200-400  If no changes will decrease pred to 40 mg/day next week.   After that will taper slowly; only reducing by 10mg every other week.    Heme;    Transfuse for Hb <8  Plts <30,000  Conditional orders in place.    FEN   Ok. Monitor creatinine and BP while on CSA    GI/Nutrition.  Had some loose stools;   Monitor external signs of GI bleed.    Plan:  Home care checkup periodically.    Sunday Feb 5 To [facility] for CBC and planned platelet transfusion plus RBC crossmatch for Tuesday Feb 7 TUesday to To [facility] for CBC, cyclosporine level  and planned platelet transfusion; likely RBC transfusions    Thursday Feb 9   RTC to [facility] for  visit and labs and planned platelet transfusion.    Pt advised about phone contacts for help/advice. She knows how to call for financial assistance in ongoing eltrombopag therapy.    Rafita Rogel MD  Professor of Medicine       Initial (On Arrival)

## 2020-01-16 ENCOUNTER — OFFICE VISIT (OUTPATIENT)
Dept: TRANSPLANT | Facility: CLINIC | Age: 75
End: 2020-01-16
Attending: INTERNAL MEDICINE
Payer: COMMERCIAL

## 2020-01-16 ENCOUNTER — APPOINTMENT (OUTPATIENT)
Dept: LAB | Facility: CLINIC | Age: 75
End: 2020-01-16
Attending: INTERNAL MEDICINE
Payer: COMMERCIAL

## 2020-01-16 VITALS
HEART RATE: 73 BPM | SYSTOLIC BLOOD PRESSURE: 170 MMHG | RESPIRATION RATE: 16 BRPM | DIASTOLIC BLOOD PRESSURE: 93 MMHG | TEMPERATURE: 97.6 F | OXYGEN SATURATION: 99 % | BODY MASS INDEX: 24.54 KG/M2 | HEIGHT: 60 IN | WEIGHT: 125 LBS

## 2020-01-16 DIAGNOSIS — D61.3 IDIOPATHIC APLASTIC ANEMIA (H): ICD-10-CM

## 2020-01-16 DIAGNOSIS — K21.9 GASTROESOPHAGEAL REFLUX DISEASE WITHOUT ESOPHAGITIS: ICD-10-CM

## 2020-01-16 LAB
ALBUMIN SERPL-MCNC: 3.4 G/DL (ref 3.4–5)
ALP SERPL-CCNC: 48 U/L (ref 40–150)
ALT SERPL W P-5'-P-CCNC: 17 U/L (ref 0–50)
ANION GAP SERPL CALCULATED.3IONS-SCNC: 3 MMOL/L (ref 3–14)
AST SERPL W P-5'-P-CCNC: 12 U/L (ref 0–45)
BASOPHILS # BLD AUTO: 0 10E9/L (ref 0–0.2)
BASOPHILS NFR BLD AUTO: 0.3 %
BILIRUB SERPL-MCNC: 1.1 MG/DL (ref 0.2–1.3)
BUN SERPL-MCNC: 36 MG/DL (ref 7–30)
CALCIUM SERPL-MCNC: 8.3 MG/DL (ref 8.5–10.1)
CHLORIDE SERPL-SCNC: 113 MMOL/L (ref 94–109)
CO2 SERPL-SCNC: 25 MMOL/L (ref 20–32)
CREAT SERPL-MCNC: 1.41 MG/DL (ref 0.52–1.04)
DIFFERENTIAL METHOD BLD: ABNORMAL
EOSINOPHIL # BLD AUTO: 0.1 10E9/L (ref 0–0.7)
EOSINOPHIL NFR BLD AUTO: 2.1 %
ERYTHROCYTE [DISTWIDTH] IN BLOOD BY AUTOMATED COUNT: 14.9 % (ref 10–15)
GFR SERPL CREATININE-BSD FRML MDRD: 36 ML/MIN/{1.73_M2}
GLUCOSE SERPL-MCNC: 96 MG/DL (ref 70–99)
HCT VFR BLD AUTO: 29.8 % (ref 35–47)
HGB BLD-MCNC: 9.7 G/DL (ref 11.7–15.7)
IMM GRANULOCYTES # BLD: 0 10E9/L (ref 0–0.4)
IMM GRANULOCYTES NFR BLD: 0.3 %
LYMPHOCYTES # BLD AUTO: 1.5 10E9/L (ref 0.8–5.3)
LYMPHOCYTES NFR BLD AUTO: 40.1 %
MCH RBC QN AUTO: 36.7 PG (ref 26.5–33)
MCHC RBC AUTO-ENTMCNC: 32.6 G/DL (ref 31.5–36.5)
MCV RBC AUTO: 113 FL (ref 78–100)
MONOCYTES # BLD AUTO: 0.5 10E9/L (ref 0–1.3)
MONOCYTES NFR BLD AUTO: 12.3 %
NEUTROPHILS # BLD AUTO: 1.7 10E9/L (ref 1.6–8.3)
NEUTROPHILS NFR BLD AUTO: 44.9 %
NRBC # BLD AUTO: 0 10*3/UL
NRBC BLD AUTO-RTO: 0 /100
PLATELET # BLD AUTO: 80 10E9/L (ref 150–450)
POTASSIUM SERPL-SCNC: 4.6 MMOL/L (ref 3.4–5.3)
PROT SERPL-MCNC: 7.4 G/DL (ref 6.8–8.8)
RBC # BLD AUTO: 2.64 10E12/L (ref 3.8–5.2)
SODIUM SERPL-SCNC: 141 MMOL/L (ref 133–144)
WBC # BLD AUTO: 3.8 10E9/L (ref 4–11)

## 2020-01-16 PROCEDURE — 36415 COLL VENOUS BLD VENIPUNCTURE: CPT

## 2020-01-16 PROCEDURE — 80053 COMPREHEN METABOLIC PANEL: CPT | Performed by: PHYSICIAN ASSISTANT

## 2020-01-16 PROCEDURE — G0463 HOSPITAL OUTPT CLINIC VISIT: HCPCS | Mod: ZF

## 2020-01-16 PROCEDURE — 85025 COMPLETE CBC W/AUTO DIFF WBC: CPT | Performed by: PHYSICIAN ASSISTANT

## 2020-01-16 RX ORDER — PANTOPRAZOLE SODIUM 20 MG/1
20 TABLET, DELAYED RELEASE ORAL DAILY
Qty: 90 TABLET | Refills: 11 | Status: SHIPPED | OUTPATIENT
Start: 2020-01-16 | End: 2020-05-14

## 2020-01-16 ASSESSMENT — PAIN SCALES - GENERAL: PAINLEVEL: NO PAIN (0)

## 2020-01-16 ASSESSMENT — MIFFLIN-ST. JEOR: SCORE: 978.5

## 2020-01-16 NOTE — LETTER
1/16/2020      RE: Bonny Raymundo  [address]  [facility] [address]       [facility]  Outpatient Progress Note  Last clinic visit: 11/20/19    Hem/onc History:     Bonny is a 73 year old female with a hx of DVT/PE and aplastic anemia. She initially presented in the fall of 2016 with fatigue and shortness of breath and was found to be pancytopenia with BMBX concerning for aplastic anemia. By December of 2016 she was transfusion dependent with platelets under 10 k and ANC dropped under 500. She was treated with ATG, prednisone, cyclosporine, eltrombopag 1/9/17. Since that time, she has required dose reduction in CsA due to renal insufficiency and hyperbilirubinemia. She was initially taking 250mg BID and progressively decreased to 75mg BID by 3/2017. Her dose was increased to 125mg BID in late 2017(4-8) and then decreased again to 100mg BID early 2018. Since 6/2018, she has been taking 75mg BID with fluctuating creatinine.    She has been transfusion independent since 4/2017.    Interval history:   Ms Raymundo presents today unaccompanied. She is doing well and has no particular questions nor concerns. She did mention that it'd be nice if she did not need to have such frequent clinic visits, and would be more than happy to get blood work done closer to home per our preference.    On ROS in addition to the above  Endorses:  +tremor - fine tremor not affecting her ability to write or button; noticed over past month or so  +back pain - chronic, takes acetaminophen at least once per day. She did shovel snow yesterday and noticed some mild L sided back pain  +Headache for past two days - slight ache, localized over right temple  +chronic diplopia - has b/l IOLs; uses glasses with a prism  +Hard to stand for long periods of time  +Chronically decreased exertional tolerance - can walk half a mile walk with her dog (a beagle), but then will need to lie down   + stable energy level  +Occasional diarrhea -  "she thinks it's due to CsA, noted over the past 1-2 months; takes metamucil to help \"even out\" bowel habits    Denies  fevers, chills, NS, dysphagia/odynophagia, change in weight, change in appetite, cough, SOB, CP, n/v, abd pain, constipation, hematochezia, dysuria, hematuria, swelling, rashes, lymphadenopathy    Comprehensive ROS otherwise negative.    Past Medical History:   Reviewed in EPIC    Past Medical History:   Diagnosis Date     Allergic rhinitis due to other allergen      Aplastic anemia (H) 2017     Closed anterior dislocation of humerus      DVT of lower extremity (deep venous thrombosis) (H) 10-    Left after prolonged sitting-plane     DVT, recurrent, lower extremity, acute (H)      Headache(784.0)      Osteoporosis, unspecified      Pulmonary emboli (H) 10-12     Sensorineural hearing loss, unspecified      Sprain of ankle, unspecified site     L      Past Surgical History:   Reviewed in EPIC    Past Surgical History:   Procedure Laterality Date     ARTHRODESIS FOOT  11    Hallux valgus R-1st MP joint     BLEPHAROPLASTY BILATERAL  ,      BONE MARROW BIOPSY, BONE SPECIMEN, NEEDLE/TROCAR N/A 2016    Procedure: BIOPSY BONE MARROW;  Surgeon: Mu Morgan MD;  Location:  GI     BONE MARROW BIOPSY, BONE SPECIMEN, NEEDLE/TROCAR N/A 2016    Procedure: BIOPSY BONE MARROW;  Surgeon: Mu Morgan MD;  Location:  GI     C DEXA INTERPRETATION, AXIAL  8-20-03     C NONSPECIFIC PROCEDURE           C NONSPECIFIC PROCEDURE      hysterectomy/BSO     C NONSPECIFIC PROCEDURE      myomectomy (fibroids)     CATARACT IOL, RT/LT Right      CATARACT IOL, RT/LT Left      COLONOSCOPY N/A 2018    Procedure: COLONOSCOPY;  colonoscopy;  Surgeon: Meliton Castrejon MD;  Location:  GI     EXCHANGE INTRAOCULAR LENS IMPLANT Right 2015    Procedure: EXCHANGE INTRAOCULAR LENS IMPLANT;  Surgeon: Garrett Dawson MD;  Location:  EC "     HC COLONOSCOPY THRU STOMA, DIAGNOSTIC      normal- minimal diverticulosis     PICC INSERTION Left 2017    5fr DL BioFlo PICC, 42cm (2cm external) in the L basilic vein w/ tip in the  SVC RA junction.     VITRECTOMY PARSPLANA WITH 23 GAUGE SYSTEM Right 2015    Procedure: VITRECTOMY PARSPLANA WITH 23 GAUGE SYSTEM;  Surgeon: Racheal Loyd MD;  Location: [facility]      Social History:   Reviewed in EPIC    Social History     Tobacco Use     Smoking status: Former Smoker     Last attempt to quit: 1973     Years since quittin.7     Smokeless tobacco: Never Used   Substance Use Topics     Alcohol use: No     Alcohol/week: 0.0 standard drinks     Comment: occasionally      Family History:   Reviewed in EPIC    Family History   Problem Relation Age of Onset     Musculoskeletal Disorder Mother         MS     Heart Disease Father      Heart Disease Brother         afib      Medications:   Reviewed in EPIC    Medications reviewed with patient    Current Outpatient Medications   Medication Sig     acetaminophen (TYLENOL) 325 MG tablet Take 1 tablet (325 mg) by mouth every 6 hours as needed for mild pain or fever maximum 2000 mg (2 grams) per day     Calcium Carb-Cholecalciferol (CALCIUM + D3) 600-200 MG-UNIT TABS Take 1 tablet by mouth 2 times daily     COMPRESSION STOCKINGS Wear compression stockings at 20-30 mmHg rating most time during the day to the affected leg (left leg) or both legs. Take them off at night.     cycloSPORINE modified (GENERIC EQUIVALENT) 25 MG capsule TAKE 3 CAPSULES (75 MG) BY MOUTH 2 TIMES DAILY OR AS DIRECTED     diphenhydrAMINE (BENADRYL ALLERGY) 25 MG tablet Take 25 mg by mouth At Bedtime      eltrombopag (PROMACTA) 50 MG tablet Take 3 tablets (150 mg) by mouth daily Administer on an empty stomach, 1 hour before or 2 hours after a meal. Or as directed     menthol-zinc oxide (CALMOSEPTINE) 0.44-20.625 % OINT ointment Apply topically 4 times daily as needed for skin  protection     ORDER FOR DME Equipment being ordered: knee high compression stockings- 18-20 mm     pantoprazole (PROTONIX) 20 MG EC tablet Take 1 tablet (20 mg) by mouth daily     psyllium (METAMUCIL) 58.6 % packet Take 1 packet by mouth Every Mon, Wed, Fri Morning     No current facility-administered medications for this visit.       Physical Exam (Resident / Clinician):   Blood pressure (!) 170/93, pulse 73, temperature 97.6  F (36.4  C), temperature source Oral, resp. rate 16, weight 56.7 kg (125 lb), SpO2 99 %, not currently breastfeeding.    Repeat SBP (R) 136 -> (R) 163 -> (L) 151    ECOG PS: 1  Constitutional: WDWN female in NAD, pleasant and appropriate  HEENT:  NC/AT, no icterus, OP clear, MMM  Skin: No jaundice nor ecchymoses  Lungs: CTAB, no w/r/r, nonlabored breathing  Cardiovascular: RRR, S1, S2, no m/r/g  GI/Abdomen: +BS, soft, nontender, nondistended, no organomegaly nor masses  Back: no tenderness over spinous processes, iliac crests, scapulae  MSK/Extremities: Warm, well perfused. No edema  LN: no cervical, supraclavicular, nor axillary lymphadenopathy  Neurologic: alert, answering questions appropriately, moving all extremities spontaneously. Fine resting tremor in hands without dysmetria; finger to nose and heel to shin intact, gait stable  Psych: appropriate affect  Data:   Reviewed in EPIC and notable for:    Results for LISSETH PARIKH (MRN [id number]) as of 1/17/2020 22:19   Ref. Range 1/16/2020 16:18   Sodium Latest Ref Range: 133 - 144 mmol/L 141   Potassium Latest Ref Range: 3.4 - 5.3 mmol/L 4.6   Chloride Latest Ref Range: 94 - 109 mmol/L 113 (H)   Carbon Dioxide Latest Ref Range: 20 - 32 mmol/L 25   Urea Nitrogen Latest Ref Range: 7 - 30 mg/dL 36 (H)   Creatinine Latest Ref Range: 0.52 - 1.04 mg/dL 1.41 (H)   GFR Estimate Latest Ref Range: >60 mL/min/1.73_m2 36 (L)   GFR Estimate If Black Latest Ref Range: >60 mL/min/1.73_m2 42 (L)   Calcium Latest Ref Range: 8.5 - 10.1 mg/dL 8.3 (L)    Anion Gap Latest Ref Range: 3 - 14 mmol/L 3   Albumin Latest Ref Range: 3.4 - 5.0 g/dL 3.4   Protein Total Latest Ref Range: 6.8 - 8.8 g/dL 7.4   Bilirubin Total Latest Ref Range: 0.2 - 1.3 mg/dL 1.1   Alkaline Phosphatase Latest Ref Range: 40 - 150 U/L 48   ALT Latest Ref Range: 0 - 50 U/L 17   AST Latest Ref Range: 0 - 45 U/L 12   Glucose Latest Ref Range: 70 - 99 mg/dL 96   WBC Latest Ref Range: 4.0 - 11.0 10e9/L 3.8 (L)   Hemoglobin Latest Ref Range: 11.7 - 15.7 g/dL 9.7 (L)   Hematocrit Latest Ref Range: 35.0 - 47.0 % 29.8 (L)   Platelet Count Latest Ref Range: 150 - 450 10e9/L 80 (L)   RBC Count Latest Ref Range: 3.8 - 5.2 10e12/L 2.64 (L)   MCV Latest Ref Range: 78 - 100 fl 113 (H)   MCH Latest Ref Range: 26.5 - 33.0 pg 36.7 (H)   MCHC Latest Ref Range: 31.5 - 36.5 g/dL 32.6   RDW Latest Ref Range: 10.0 - 15.0 % 14.9   Diff Method Unknown Automated Method   % Neutrophils Latest Units: % 44.9   % Lymphocytes Latest Units: % 40.1   % Monocytes Latest Units: % 12.3   % Eosinophils Latest Units: % 2.1   % Basophils Latest Units: % 0.3   % Immature Granulocytes Latest Units: % 0.3   Nucleated RBCs Latest Ref Range: 0 /100 0   Absolute Neutrophil Latest Ref Range: 1.6 - 8.3 10e9/L 1.7   Absolute Lymphocytes Latest Ref Range: 0.8 - 5.3 10e9/L 1.5   Absolute Monocytes Latest Ref Range: 0.0 - 1.3 10e9/L 0.5   Absolute Eosinophils Latest Ref Range: 0.0 - 0.7 10e9/L 0.1   Absolute Basophils Latest Ref Range: 0.0 - 0.2 10e9/L 0.0   Abs Immature Granulocytes Latest Ref Range: 0 - 0.4 10e9/L 0.0   Absolute Nucleated RBC Unknown 0.0       Assessment and Plan:   # Aplastic Anemia    Ms. Raymundo is doing well overall and generally tolerating her medications. She continues to have a fluctuating creatinine despite being maintained on 75mg BID CsA for the past 1.5 years. Today, her creatinine is 1.41, slightly up from 1.36 from 2 months ago. She also endorses a fine tremor which is not affecting her ADLs.    Her counts remain  stable with ongoing mild anemia and thrombocytopenia and minimal leukopenia but with normal absolute differential.    Her SBP is elevated today, which could be a side effect of the CNI. In reviewing her prior clinic blood pressures, they do fluctuate from a low of 109 to today's high of 170. On average, her SBP tends to be around 120's-130's. Today's value may be an isolated incident and would advise her to check intermittently at home. Certainly in the context of her renal function, it would be ideal to maintain adequate BP control, however given that she does occasionally have lower SBP readings, we need to be cautious about starting an antihypertensive. If she does need one, would likely start amlodipine 5mg qday.    Plan  -pantoprazole refill eprescribed  -continue cyclosporine 75mg BID and eltrombopag  -instructed patient to check BP intermittently at home; if remains elevated, will initiate amlodipine 5mg  -RTC 2 months with labs    Labs and treatment plan reviewed with patient. All questions answered.    In this 25 minutes visit, > 50% spent in counseling and coordinating care.    Patient discussed with Dr. Rogel.    Richa Thorpe MD ([place]), PhD   Hem/Onc Fellow    Patient has been seen and evaluated by me. I have reviewed today's vital signs, medications, labs and imaging results. I have discussed the plan with the team and agree with the findings and plan in this note.      Counts ok and renal fxn stablized but BP up a bit.  Patient advised to monitor BP over next few weeks (firehouses; big drug stores, etc) and call in results.    Then we'll decide if treatment needed; or change in CSA is indicated.    Otherwise no new changes in plan.  Rafita Rogel MD

## 2020-01-16 NOTE — NURSING NOTE
Oncology Rooming Note    January 16, 2020 4:32 PM   Bonny Raymundo is a 74 year old female who presents for:    Chief Complaint   Patient presents with     Blood Draw     Labs drawn via VPT by RN in lab. VS taken. Pt checked in for next appt     RECHECK     Return: Aplastic anemia     Initial Vitals: BP (!) 170/93 (BP Location: Right arm, Patient Position: Sitting, Cuff Size: Adult Regular)   Pulse 73   Temp 97.6  F (36.4  C) (Oral)   Resp 16   Ht 1.524 m (5')   Wt 56.7 kg (125 lb)   SpO2 99%   BMI 24.41 kg/m   Estimated body mass index is 24.41 kg/m  as calculated from the following:    Height as of this encounter: 1.524 m (5').    Weight as of this encounter: 56.7 kg (125 lb). Body surface area is 1.55 meters squared.  No Pain (0) Comment: Data Unavailable   No LMP recorded. Patient has had a hysterectomy.  Allergies reviewed: Yes  Medications reviewed: Yes    Medications: Medication refills not needed today.  Pharmacy name entered into [facility]:    [facility], [place] - [address], [address]  [facility], [place] - [address] [facility] [address]  [facility], [place] - [address]  WRITTEN PRESCRIPTION REQUESTED  [id number] [place], [place] - [address]    Clinical concerns:ANGIE Dangelo, CHRIS

## 2020-01-16 NOTE — PROGRESS NOTES
[facility]  Outpatient Progress Note  Last clinic visit: 11/20/19    Hem/onc History:     Bonny is a 73 year old female with a hx of DVT/PE and aplastic anemia. She initially presented in the fall of 2016 with fatigue and shortness of breath and was found to be pancytopenia with BMBX concerning for aplastic anemia. By December of 2016 she was transfusion dependent with platelets under 10 k and ANC dropped under 500. She was treated with ATG, prednisone, cyclosporine, eltrombopag 1/9/17. Since that time, she has required dose reduction in CsA due to renal insufficiency and hyperbilirubinemia. She was initially taking 250mg BID and progressively decreased to 75mg BID by 3/2017. Her dose was increased to 125mg BID in late 2017(4-8) and then decreased again to 100mg BID early 2018. Since 6/2018, she has been taking 75mg BID with fluctuating creatinine.    She has been transfusion independent since 4/2017.    Interval history:   Ms Raymundo presents today unaccompanied. She is doing well and has no particular questions nor concerns. She did mention that it'd be nice if she did not need to have such frequent clinic visits, and would be more than happy to get blood work done closer to home per our preference.    On ROS in addition to the above  Endorses:  +tremor - fine tremor not affecting her ability to write or button; noticed over past month or so  +back pain - chronic, takes acetaminophen at least once per day. She did shovel snow yesterday and noticed some mild L sided back pain  +Headache for past two days - slight ache, localized over right temple  +chronic diplopia - has b/l IOLs; uses glasses with a prism  +Hard to stand for long periods of time  +Chronically decreased exertional tolerance - can walk half a mile walk with her dog (a beagle), but then will need to lie down   + stable energy level  +Occasional diarrhea - she thinks it's due to CsA, noted over the past 1-2 months; takes metamucil to help  "\"even out\" bowel habits    Denies  fevers, chills, NS, dysphagia/odynophagia, change in weight, change in appetite, cough, SOB, CP, n/v, abd pain, constipation, hematochezia, dysuria, hematuria, swelling, rashes, lymphadenopathy    Comprehensive ROS otherwise negative.    Past Medical History:   Reviewed in EPIC    Past Medical History:   Diagnosis Date     Allergic rhinitis due to other allergen      Aplastic anemia (H) 2017     Closed anterior dislocation of humerus      DVT of lower extremity (deep venous thrombosis) (H) 10-    Left after prolonged sitting-plane     DVT, recurrent, lower extremity, acute (H)      Headache(784.0)      Osteoporosis, unspecified      Pulmonary emboli (H) 10-     Sensorineural hearing loss, unspecified      Sprain of ankle, unspecified site     L      Past Surgical History:   Reviewed in EPIC    Past Surgical History:   Procedure Laterality Date     ARTHRODESIS FOOT  11    Hallux valgus R-1st MP joint     BLEPHAROPLASTY BILATERAL  ,      BONE MARROW BIOPSY, BONE SPECIMEN, NEEDLE/TROCAR N/A 2016    Procedure: BIOPSY BONE MARROW;  Surgeon: Mu Morgan MD;  Location: [facility]     BONE MARROW BIOPSY, BONE SPECIMEN, NEEDLE/TROCAR N/A 2016    Procedure: BIOPSY BONE MARROW;  Surgeon: Mu Morgan MD;  Location:  GI     C DEXA INTERPRETATION, AXIAL  03     C NONSPECIFIC PROCEDURE           C NONSPECIFIC PROCEDURE      hysterectomy/BSO     C NONSPECIFIC PROCEDURE      myomectomy (fibroids)     CATARACT IOL, RT/LT Right      CATARACT IOL, RT/LT Left      COLONOSCOPY N/A 2018    Procedure: COLONOSCOPY;  colonoscopy;  Surgeon: Meliton Castrejon MD;  Location:  GI     EXCHANGE INTRAOCULAR LENS IMPLANT Right 2015    Procedure: EXCHANGE INTRAOCULAR LENS IMPLANT;  Surgeon: Garrett Dawson MD;  Location:  EC     HC COLONOSCOPY THRU STOMA, DIAGNOSTIC      normal- minimal diverticulosis     " PICC INSERTION Left 2017    5fr DL BioFlo PICC, 42cm (2cm external) in the L basilic vein w/ tip in the  SVC RA junction.     VITRECTOMY PARSPLANA WITH 23 GAUGE SYSTEM Right 2015    Procedure: VITRECTOMY PARSPLANA WITH 23 GAUGE SYSTEM;  Surgeon: Racheal Loyd MD;  Location: [facility]      Social History:   Reviewed in EPIC    Social History     Tobacco Use     Smoking status: Former Smoker     Last attempt to quit: 1973     Years since quittin.7     Smokeless tobacco: Never Used   Substance Use Topics     Alcohol use: No     Alcohol/week: 0.0 standard drinks     Comment: occasionally      Family History:   Reviewed in EPIC    Family History   Problem Relation Age of Onset     Musculoskeletal Disorder Mother         MS     Heart Disease Father      Heart Disease Brother         afib      Medications:   Reviewed in EPIC    Medications reviewed with patient    Current Outpatient Medications   Medication Sig     acetaminophen (TYLENOL) 325 MG tablet Take 1 tablet (325 mg) by mouth every 6 hours as needed for mild pain or fever maximum 2000 mg (2 grams) per day     Calcium Carb-Cholecalciferol (CALCIUM + D3) 600-200 MG-UNIT TABS Take 1 tablet by mouth 2 times daily     COMPRESSION STOCKINGS Wear compression stockings at 20-30 mmHg rating most time during the day to the affected leg (left leg) or both legs. Take them off at night.     cycloSPORINE modified (GENERIC EQUIVALENT) 25 MG capsule TAKE 3 CAPSULES (75 MG) BY MOUTH 2 TIMES DAILY OR AS DIRECTED     diphenhydrAMINE (BENADRYL ALLERGY) 25 MG tablet Take 25 mg by mouth At Bedtime      eltrombopag (PROMACTA) 50 MG tablet Take 3 tablets (150 mg) by mouth daily Administer on an empty stomach, 1 hour before or 2 hours after a meal. Or as directed     menthol-zinc oxide (CALMOSEPTINE) 0.44-20.625 % OINT ointment Apply topically 4 times daily as needed for skin protection     ORDER FOR DME Equipment being ordered: knee high compression stockings-  18-20 mm     pantoprazole (PROTONIX) 20 MG EC tablet Take 1 tablet (20 mg) by mouth daily     psyllium (METAMUCIL) 58.6 % packet Take 1 packet by mouth Every Mon, Wed, Fri Morning     No current facility-administered medications for this visit.       Physical Exam (Resident / Clinician):   Blood pressure (!) 170/93, pulse 73, temperature 97.6  F (36.4  C), temperature source Oral, resp. rate 16, weight 56.7 kg (125 lb), SpO2 99 %, not currently breastfeeding.    Repeat SBP (R) 136 -> (R) 163 -> (L) 151    ECOG PS: 1  Constitutional: WDWN female in NAD, pleasant and appropriate  HEENT:  NC/AT, no icterus, OP clear, MMM  Skin: No jaundice nor ecchymoses  Lungs: CTAB, no w/r/r, nonlabored breathing  Cardiovascular: RRR, S1, S2, no m/r/g  GI/Abdomen: +BS, soft, nontender, nondistended, no organomegaly nor masses  Back: no tenderness over spinous processes, iliac crests, scapulae  MSK/Extremities: Warm, well perfused. No edema  LN: no cervical, supraclavicular, nor axillary lymphadenopathy  Neurologic: alert, answering questions appropriately, moving all extremities spontaneously. Fine resting tremor in hands without dysmetria; finger to nose and heel to shin intact, gait stable  Psych: appropriate affect  Data:   Reviewed in EPIC and notable for:    Results for LISSETH PARIKH (MRN [id number]) as of 1/17/2020 22:19   Ref. Range 1/16/2020 16:18   Sodium Latest Ref Range: 133 - 144 mmol/L 141   Potassium Latest Ref Range: 3.4 - 5.3 mmol/L 4.6   Chloride Latest Ref Range: 94 - 109 mmol/L 113 (H)   Carbon Dioxide Latest Ref Range: 20 - 32 mmol/L 25   Urea Nitrogen Latest Ref Range: 7 - 30 mg/dL 36 (H)   Creatinine Latest Ref Range: 0.52 - 1.04 mg/dL 1.41 (H)   GFR Estimate Latest Ref Range: >60 mL/min/1.73_m2 36 (L)   GFR Estimate If Black Latest Ref Range: >60 mL/min/1.73_m2 42 (L)   Calcium Latest Ref Range: 8.5 - 10.1 mg/dL 8.3 (L)   Anion Gap Latest Ref Range: 3 - 14 mmol/L 3   Albumin Latest Ref Range: 3.4 - 5.0 g/dL  3.4   Protein Total Latest Ref Range: 6.8 - 8.8 g/dL 7.4   Bilirubin Total Latest Ref Range: 0.2 - 1.3 mg/dL 1.1   Alkaline Phosphatase Latest Ref Range: 40 - 150 U/L 48   ALT Latest Ref Range: 0 - 50 U/L 17   AST Latest Ref Range: 0 - 45 U/L 12   Glucose Latest Ref Range: 70 - 99 mg/dL 96   WBC Latest Ref Range: 4.0 - 11.0 10e9/L 3.8 (L)   Hemoglobin Latest Ref Range: 11.7 - 15.7 g/dL 9.7 (L)   Hematocrit Latest Ref Range: 35.0 - 47.0 % 29.8 (L)   Platelet Count Latest Ref Range: 150 - 450 10e9/L 80 (L)   RBC Count Latest Ref Range: 3.8 - 5.2 10e12/L 2.64 (L)   MCV Latest Ref Range: 78 - 100 fl 113 (H)   MCH Latest Ref Range: 26.5 - 33.0 pg 36.7 (H)   MCHC Latest Ref Range: 31.5 - 36.5 g/dL 32.6   RDW Latest Ref Range: 10.0 - 15.0 % 14.9   Diff Method Unknown Automated Method   % Neutrophils Latest Units: % 44.9   % Lymphocytes Latest Units: % 40.1   % Monocytes Latest Units: % 12.3   % Eosinophils Latest Units: % 2.1   % Basophils Latest Units: % 0.3   % Immature Granulocytes Latest Units: % 0.3   Nucleated RBCs Latest Ref Range: 0 /100 0   Absolute Neutrophil Latest Ref Range: 1.6 - 8.3 10e9/L 1.7   Absolute Lymphocytes Latest Ref Range: 0.8 - 5.3 10e9/L 1.5   Absolute Monocytes Latest Ref Range: 0.0 - 1.3 10e9/L 0.5   Absolute Eosinophils Latest Ref Range: 0.0 - 0.7 10e9/L 0.1   Absolute Basophils Latest Ref Range: 0.0 - 0.2 10e9/L 0.0   Abs Immature Granulocytes Latest Ref Range: 0 - 0.4 10e9/L 0.0   Absolute Nucleated RBC Unknown 0.0       Assessment and Plan:   # Aplastic Anemia    Ms. Raymundo is doing well overall and generally tolerating her medications. She continues to have a fluctuating creatinine despite being maintained on 75mg BID CsA for the past 1.5 years. Today, her creatinine is 1.41, slightly up from 1.36 from 2 months ago. She also endorses a fine tremor which is not affecting her ADLs.    Her counts remain stable with ongoing mild anemia and thrombocytopenia and minimal leukopenia but with  normal absolute differential.    Her SBP is elevated today, which could be a side effect of the CNI. In reviewing her prior clinic blood pressures, they do fluctuate from a low of 109 to today's high of 170. On average, her SBP tends to be around 120's-130's. Today's value may be an isolated incident and would advise her to check intermittently at home. Certainly in the context of her renal function, it would be ideal to maintain adequate BP control, however given that she does occasionally have lower SBP readings, we need to be cautious about starting an antihypertensive. If she does need one, would likely start amlodipine 5mg qday.    Plan  -pantoprazole refill eprescribed  -continue cyclosporine 75mg BID and eltrombopag  -instructed patient to check BP intermittently at home; if remains elevated, will initiate amlodipine 5mg  -RTC 2 months with labs    Labs and treatment plan reviewed with patient. All questions answered.    In this 25 minutes visit, > 50% spent in counseling and coordinating care.    Patient discussed with Dr. Rogel.    Richa Thorpe MD ([place]), PhD   Hem/Onc Fellow    Patient has been seen and evaluated by me. I have reviewed today's vital signs, medications, labs and imaging results. I have discussed the plan with the team and agree with the findings and plan in this note.      Counts ok and renal fxn stablized but BP up a bit.  Patient advised to monitor BP over next few weeks (firehouses; big drug stores, etc) and call in results.    Then we'll decide if treatment needed; or change in CSA is indicated.    Otherwise no new changes in plan.  Rafita Rogel MD

## 2020-01-16 NOTE — NURSING NOTE
Chief Complaint   Patient presents with     Blood Draw     Labs drawn via VPT by RN in lab. VS taken. Pt checked in for next appt     Labs collected from venipuncture by RN. Vitals taken. Checked in for appointment(s).    Becka LEES RN PHN BSN  BMT/Oncology Lab

## 2020-01-24 ENCOUNTER — OFFICE VISIT (OUTPATIENT)
Dept: FAMILY MEDICINE | Facility: CLINIC | Age: 75
End: 2020-01-24
Payer: COMMERCIAL

## 2020-01-24 VITALS
TEMPERATURE: 97.8 F | BODY MASS INDEX: 24.41 KG/M2 | SYSTOLIC BLOOD PRESSURE: 137 MMHG | WEIGHT: 125 LBS | HEART RATE: 79 BPM | OXYGEN SATURATION: 98 % | DIASTOLIC BLOOD PRESSURE: 84 MMHG

## 2020-01-24 DIAGNOSIS — R82.90 NONSPECIFIC FINDING ON EXAMINATION OF URINE: ICD-10-CM

## 2020-01-24 DIAGNOSIS — R30.0 DYSURIA: Primary | ICD-10-CM

## 2020-01-24 DIAGNOSIS — N30.00 ACUTE CYSTITIS WITHOUT HEMATURIA: ICD-10-CM

## 2020-01-24 LAB
ALBUMIN UR-MCNC: NEGATIVE MG/DL
APPEARANCE UR: CLEAR
BACTERIA #/AREA URNS HPF: ABNORMAL /HPF
BILIRUB UR QL STRIP: NEGATIVE
COLOR UR AUTO: YELLOW
GLUCOSE UR STRIP-MCNC: NEGATIVE MG/DL
HGB UR QL STRIP: NEGATIVE
KETONES UR STRIP-MCNC: NEGATIVE MG/DL
LEUKOCYTE ESTERASE UR QL STRIP: ABNORMAL
NITRATE UR QL: NEGATIVE
NON-SQ EPI CELLS #/AREA URNS LPF: ABNORMAL /LPF
PH UR STRIP: 6 PH (ref 5–7)
RBC #/AREA URNS AUTO: ABNORMAL /HPF
SOURCE: ABNORMAL
SP GR UR STRIP: 1.02 (ref 1–1.03)
UROBILINOGEN UR STRIP-ACNC: 1 EU/DL (ref 0.2–1)
WBC #/AREA URNS AUTO: ABNORMAL /HPF

## 2020-01-24 PROCEDURE — 99213 OFFICE O/P EST LOW 20 MIN: CPT | Performed by: NURSE PRACTITIONER

## 2020-01-24 PROCEDURE — 81001 URINALYSIS AUTO W/SCOPE: CPT | Performed by: NURSE PRACTITIONER

## 2020-01-24 PROCEDURE — 87086 URINE CULTURE/COLONY COUNT: CPT | Performed by: NURSE PRACTITIONER

## 2020-01-24 RX ORDER — CEPHALEXIN 500 MG/1
500 CAPSULE ORAL 2 TIMES DAILY
Qty: 14 CAPSULE | Refills: 0 | Status: SHIPPED | OUTPATIENT
Start: 2020-01-24 | End: 2020-03-27

## 2020-01-24 NOTE — LETTER
[facility]  [address]  [address]  [place]  [address]  Tel: [phone]    January 27, 2020    Bonny Raymundo  [address] [facility] [place] [address]        Dear MsCaitlyn Raymundo,    The culture did not identify a single organism causing your symptoms.  In these cases the keflex is usually a good choice of treatment.  If you do not get better please call the clinic or schedule a follow up visit         Sincerely,    María Rock, MARYLOU/SMl          Enclosure: Lab Results  Results for orders placed or performed in visit on 01/24/20   *UA reflex to Microscopic and Culture ([place] and [facility] (except [place] and [place])     Status: Abnormal   Result Value Ref Range    Color Urine Yellow     Appearance Urine Clear     Glucose Urine Negative NEG^Negative mg/dL    Bilirubin Urine Negative NEG^Negative    Ketones Urine Negative NEG^Negative mg/dL    Specific Gravity Urine 1.020 1.003 - 1.035    Blood Urine Negative NEG^Negative    pH Urine 6.0 5.0 - 7.0 pH    Protein Albumin Urine Negative NEG^Negative mg/dL    Urobilinogen Urine 1.0 0.2 - 1.0 EU/dL    Nitrite Urine Negative NEG^Negative    Leukocyte Esterase Urine Small (A) NEG^Negative    Source Midstream Urine    Urine Microscopic     Status: Abnormal   Result Value Ref Range    WBC Urine 10-25 (A) OTO5^0 - 5 /HPF    RBC Urine O - 2 OTO2^O - 2 /HPF    Squamous Epithelial /LPF Urine Many (A) FEW^Few /LPF    Bacteria Urine Many (A) NEG^Negative /HPF   Urine Culture Aerobic Bacterial     Status: None   Result Value Ref Range    Specimen Description Midstream Urine     Culture Micro       >100,000 colonies/mL  mixed urogenital baldemar  Susceptibility testing not routinely done

## 2020-01-24 NOTE — PROGRESS NOTES
Subjective     Bonny Raymundo is a 74 year old female who presents to clinic today for the following health issues:    HPI   URINARY TRACT SYMPTOMS      Duration: since last night but is better now    Description  frequency, urgency and nocturia x 2    Intensity:  mild    Accompanying signs and symptoms:  Fever/chills: no   Flank pain no   Nausea and vomiting: no   Vaginal symptoms: none  Abdominal/Pelvic Pain: no     History  History of frequent UTI's: YES- 2 in the last year  History of kidney stones: no   Sexually Active: no   Possibility of pregnancy: No    Precipitating or alleviating factors: None    Therapies tried and outcome: none   Outcome:  s    Patient Active Problem List   Diagnosis     DIARRHEA     Esophageal reflux     Chest pain     Osteoporosis     CARDIOVASCULAR SCREENING; LDL GOAL LESS THAN 160     Seasonal allergic rhinitis     PE (pulmonary embolism)     Health Care Home     Sensorineural hearing loss of both ears     BPPV (benign paroxysmal positional vertigo)     Anemia     DVT, recurrent, lower extremity, acute (H)     Pulmonary emboli (H)     Advanced directives, counseling/discussion     Pancytopenia (H)     Aplastic anemia (H)     Thyroid nodule     History of pulmonary embolism     Past Surgical History:   Procedure Laterality Date     ARTHRODESIS FOOT  11    Hallux valgus R-1st MP joint     BLEPHAROPLASTY BILATERAL  ,      BONE MARROW BIOPSY, BONE SPECIMEN, NEEDLE/TROCAR N/A 2016    Procedure: BIOPSY BONE MARROW;  Surgeon: Mu Morgan MD;  Location: [facility]     BONE MARROW BIOPSY, BONE SPECIMEN, NEEDLE/TROCAR N/A 2016    Procedure: BIOPSY BONE MARROW;  Surgeon: Mu Morgan MD;  Location:  GI     C DEXA INTERPRETATION, AXIAL  20-     C NONSPECIFIC PROCEDURE           C NONSPECIFIC PROCEDURE      hysterectomy/BSO     C NONSPECIFIC PROCEDURE      myomectomy (fibroids)     CATARACT IOL, RT/LT Right      CATARACT IOL,  RT/LT Left      COLONOSCOPY N/A 2018    Procedure: COLONOSCOPY;  colonoscopy;  Surgeon: Meliton Castrejon MD;  Location:  GI     EXCHANGE INTRAOCULAR LENS IMPLANT Right 2015    Procedure: EXCHANGE INTRAOCULAR LENS IMPLANT;  Surgeon: Garrett Dawson MD;  Location: [facility]     HC COLONOSCOPY THRU STOMA, DIAGNOSTIC      normal- minimal diverticulosis     PICC INSERTION Left 2017    5fr DL BioFlo PICC, 42cm (2cm external) in the L basilic vein w/ tip in the  SVC RA junction.     VITRECTOMY PARSPLANA WITH 23 GAUGE SYSTEM Right 2015    Procedure: VITRECTOMY PARSPLANA WITH 23 GAUGE SYSTEM;  Surgeon: Racheal Loyd MD;  Location: [facility]       Social History     Tobacco Use     Smoking status: Former Smoker     Last attempt to quit: 1973     Years since quittin.7     Smokeless tobacco: Never Used   Substance Use Topics     Alcohol use: No     Alcohol/week: 0.0 standard drinks     Comment: occasionally     Family History   Problem Relation Age of Onset     Musculoskeletal Disorder Mother         MS     Heart Disease Father      Heart Disease Brother         afib         Current Outpatient Medications   Medication Sig Dispense Refill     acetaminophen (TYLENOL) 325 MG tablet Take 1 tablet (325 mg) by mouth every 6 hours as needed for mild pain or fever maximum 2000 mg (2 grams) per day       Calcium Carb-Cholecalciferol (CALCIUM + D3) 600-200 MG-UNIT TABS Take 1 tablet by mouth 2 times daily 60 tablet      cephALEXin (KEFLEX) 500 MG capsule Take 1 capsule (500 mg) by mouth 2 times daily 14 capsule 0     COMPRESSION STOCKINGS Wear compression stockings at 20-30 mmHg rating most time during the day to the affected leg (left leg) or both legs. Take them off at night. 2 each 2     cycloSPORINE modified (GENERIC EQUIVALENT) 25 MG capsule TAKE 3 CAPSULES (75 MG) BY MOUTH 2 TIMES DAILY OR AS DIRECTED 540 capsule 2     diphenhydrAMINE (BENADRYL ALLERGY) 25 MG tablet Take 25 mg by  mouth At Bedtime  56 tablet      eltrombopag (PROMACTA) 50 MG tablet Take 3 tablets (150 mg) by mouth daily Administer on an empty stomach, 1 hour before or 2 hours after a meal. Or as directed 90 tablet 6     menthol-zinc oxide (CALMOSEPTINE) 0.44-20.625 % OINT ointment Apply topically 4 times daily as needed for skin protection 71 g 1     ORDER FOR DME Equipment being ordered: knee high compression stockings- 18-20 mm 1 Box 1     pantoprazole (PROTONIX) 20 MG EC tablet Take 1 tablet (20 mg) by mouth daily 90 tablet 11     psyllium (METAMUCIL) 58.6 % packet Take 1 packet by mouth Every Mon, Wed, Fri Morning       Allergies   Allergen Reactions     Cats      Dogs      Seasonal Allergies        Reviewed and updated as needed this visit by Provider         Review of Systems   ROS COMP: Constitutional, HEENT, cardiovascular, pulmonary, gi and gu systems are negative, except as otherwise noted.      Objective    There were no vitals taken for this visit.  There is no height or weight on file to calculate BMI.  Physical Exam   GENERAL: healthy, alert and no distress  ABDOMEN: soft, nontender,  BACK: no CVA tenderness,     Diagnostic Test Results:  Results for orders placed or performed in visit on 01/24/20   *UA reflex to Microscopic and Culture ([place] and [facility] (except [place] and [place])     Status: Abnormal   Result Value Ref Range    Color Urine Yellow     Appearance Urine Clear     Glucose Urine Negative NEG^Negative mg/dL    Bilirubin Urine Negative NEG^Negative    Ketones Urine Negative NEG^Negative mg/dL    Specific Gravity Urine 1.020 1.003 - 1.035    Blood Urine Negative NEG^Negative    pH Urine 6.0 5.0 - 7.0 pH    Protein Albumin Urine Negative NEG^Negative mg/dL    Urobilinogen Urine 1.0 0.2 - 1.0 EU/dL    Nitrite Urine Negative NEG^Negative    Leukocyte Esterase Urine Small (A) NEG^Negative    Source Midstream Urine    Urine Microscopic     Status: Abnormal   Result Value Ref Range    WBC  Urine 10-25 (A) OTO5^0 - 5 /HPF    RBC Urine O - 2 OTO2^O - 2 /HPF    Squamous Epithelial /LPF Urine Many (A) FEW^Few /LPF    Bacteria Urine Many (A) NEG^Negative /HPF   Urine Culture Aerobic Bacterial     Status: None   Result Value Ref Range    Specimen Description Midstream Urine     Culture Micro       >100,000 colonies/mL  mixed urogenital baldemar  Susceptibility testing not routinely done         Assessment & Plan       ICD-10-CM    1. Dysuria R30.0 *UA reflex to Microscopic and Culture ([place] and [facility] (except [place] and [place])     Urine Microscopic   2. Nonspecific finding on examination of urine R82.90 Urine Culture Aerobic Bacterial   3. Acute cystitis without hematuria N30.00 cephALEXin (KEFLEX) 500 MG capsule   push fluids - not just when you have an infection      KHADRA [facility]al[facility]

## 2020-01-25 LAB
BACTERIA SPEC CULT: NORMAL
SPECIMEN SOURCE: NORMAL

## 2020-01-27 NOTE — RESULT ENCOUNTER NOTE
The culture did not identify a single organism causing your symptoms.  In these cases the keflex is usually a good choice of treatment.  If you do not get better please call the clinic or schedule a follow up visit

## 2020-02-03 DIAGNOSIS — H35.351 CYSTOID MACULAR EDEMA OF RIGHT EYE: Primary | ICD-10-CM

## 2020-02-05 ENCOUNTER — OFFICE VISIT (OUTPATIENT)
Dept: OPHTHALMOLOGY | Facility: CLINIC | Age: 75
End: 2020-02-05
Attending: OPHTHALMOLOGY
Payer: COMMERCIAL

## 2020-02-05 DIAGNOSIS — H52.13 MYOPIA OF BOTH EYES: ICD-10-CM

## 2020-02-05 DIAGNOSIS — H53.2 DIPLOPIA: ICD-10-CM

## 2020-02-05 DIAGNOSIS — Z96.1 PSEUDOPHAKIA: Primary | ICD-10-CM

## 2020-02-05 PROCEDURE — G0463 HOSPITAL OUTPT CLINIC VISIT: HCPCS | Mod: ZF

## 2020-02-05 ASSESSMENT — VISUAL ACUITY
OD_CC: 20/25-3
METHOD: SNELLEN - LINEAR
OS_CC: 20/20-2

## 2020-02-05 ASSESSMENT — REFRACTION_WEARINGRX
OD_CYLINDER: +3.00
OS_ADD: +2.75
OD_AXIS: 057
OS_CYLINDER: +2.25
OD_SPHERE: -2.50
OD_ADD: +2.75
OS_SPHERE: -1.25
SPECS_TYPE: PAL
OS_AXIS: 151

## 2020-02-05 ASSESSMENT — CONF VISUAL FIELD
METHOD: COUNTING FINGERS
OD_NORMAL: 1
OS_NORMAL: 1

## 2020-02-05 ASSESSMENT — REFRACTION_FINALRX
OS_HPRISM: 3 BO
OD_VPRISM: 1 BD
OS_VPRISM: 1 BU
OD_HPRISM: 3 BO

## 2020-02-05 ASSESSMENT — REFRACTION_MANIFEST
OS_ADD: +2.75
OS_SPHERE: -1.25
OS_CYLINDER: +2.25
OD_ADD: +2.75
OS_AXIS: 159
OD_SPHERE: -2.50
OD_CYLINDER: +2.75
OD_AXIS: 060

## 2020-02-05 ASSESSMENT — TONOMETRY
OD_IOP_MMHG: 11
IOP_METHOD: ICARE
OS_IOP_MMHG: 12

## 2020-02-05 ASSESSMENT — EXTERNAL EXAM - RIGHT EYE: OD_EXAM: NORMAL

## 2020-02-05 ASSESSMENT — SLIT LAMP EXAM - LIDS
COMMENTS: NORMAL
COMMENTS: NORMAL

## 2020-02-05 ASSESSMENT — CUP TO DISC RATIO
OS_RATIO: 0.4
OD_RATIO: 0.4

## 2020-02-05 NOTE — PROGRESS NOTES
CC: Here for second opinion diplopia  HPI: Bonny Raymundo is a 74 year old here for second opinion of diplopia. She thinks it is right eye only, however she does have a small misalignment with prism in her current glasses. Her glasses are from last summer, still had diplopia with current glasses.    Ocular History:  S/p dislocated intraocular lens right eye secondary intraocular lens right eye 2.2.15 Dr. Loyd  CEIOL each eye, Dr. Snow 30-40 years ago  Lower lid blepharoplasty  Myopic fundus    Optical Coherence Tomography: 2-5-20   right eye normal, tr erm  Left eye normal    CN 2-12 intact each eye     Impression  0. Binocular diplopia- Small angle esotropia and small right hypo- no prism in current glasses on our check, patient prefers 6BO and 2BD over RE, could be due to Heavy Eye vs. Sagging Eye syndrome   1. status post 23g Pars plana vitrectomy (PPV) removal  Of Dislocated right eye and secondary intraocular lens placement 2.2.15 right eye   Retina attached, doing well, [facility] 20/25, corrects to 20/20  2. Cataract surgery (1980's) both eyes, no PCO  3. History of Lower lid blepharoplasty  4. Myopic fundus  5. Trace Epiretinal membrane right eye- not visually significant   observe    Plan:     Update MRx with prism  Follow up with retina in 1 yr  Follow up with [facility] as needed for prescription/ prism    Katy Dos Santos MD  Ophthalmology Resident, PGY-3      ~~~~~~~~~~~~~~~~~~~~~~~~~~~~~~~~~~   Complete documentation of historical and exam elements from today's encounter can be found in the full encounter summary report (not reduplicated in this progress note).  I personally obtained the chief complaint(s) and history of present illness.  I confirmed and edited as necessary the review of systems, past medical/surgical history, family history, social history, and examination findings as documented by others; and I examined the patient myself.  I personally reviewed the relevant tests, images, and reports as  documented above.  I personally reviewed the ophthalmic test(s) associated with this encounter, agree with the interpretation(s) as documented by the resident/fellow, and have edited the corresponding report(s) as necessary.   I formulated and edited as necessary the assessment and plan and discussed the findings and management plan with the patient and family    Racheal Loyd MD   of Ophthalmology.  Retina Service   Department of Ophthalmology and Visual Neurosciences   [facility]  Phone: [phone]   Fax: [phone]

## 2020-02-10 ENCOUNTER — TRANSFERRED RECORDS (OUTPATIENT)
Dept: HEALTH INFORMATION MANAGEMENT | Facility: CLINIC | Age: 75
End: 2020-02-10

## 2020-02-10 ENCOUNTER — TELEPHONE (OUTPATIENT)
Dept: ONCOLOGY | Facility: CLINIC | Age: 75
End: 2020-02-10

## 2020-02-10 NOTE — TELEPHONE ENCOUNTER
Prior Authorization Approval    Authorization Effective Date: 1/11/2020  Authorization Expiration Date: 2/9/2023  Medication: Cyclosporine Modified 25mg  Approved Dose/Quantity: 540  Reference #:   [id number]  Insurance Company: [employer] - Phone [phone] Fax [phone]  Which Pharmacy is filling the prescription (Not needed for infusion/clinic administered): [id number] - [place], [place] - [address]  Pharmacy Notified: Yes  Patient Notified:  **Instructed pharmacy to notify patient when script is ready to /ship.**

## 2020-02-10 NOTE — TELEPHONE ENCOUNTER
Prior Authorization Retail Medication Request    Medication/Dose: Cyclosporine Modified 25mg  ICD code:  D61.9, D61.818, D61.3      Insurance Name: [facility]  Insurance ID:  [id number]      Pharmacy Information:  Name:  [facility]  Phone:  [phone]  Prescriber Order Number: [id number]  Rx Reference Number: [id number]

## 2020-02-10 NOTE — TELEPHONE ENCOUNTER
PA Initiation    Medication: Cyclosporine Modified 25mg  Insurance Company: [employer] - Phone [phone] Fax [phone]  Pharmacy Filling the Rx: [id number] [place] - [address] [facility]  Filling Pharmacy Phone: [phone]  Filling Pharmacy Fax:    Start Date: 2/10/2020    Central Prior Authorization Team   Phone: [phone]

## 2020-03-09 ENCOUNTER — TELEPHONE (OUTPATIENT)
Dept: ONCOLOGY | Facility: CLINIC | Age: 75
End: 2020-03-09

## 2020-03-27 ENCOUNTER — VIRTUAL VISIT (OUTPATIENT)
Dept: FAMILY MEDICINE | Facility: CLINIC | Age: 75
End: 2020-03-27
Payer: COMMERCIAL

## 2020-03-27 DIAGNOSIS — N30.00 ACUTE CYSTITIS WITHOUT HEMATURIA: ICD-10-CM

## 2020-03-27 PROCEDURE — G2012 BRIEF CHECK IN BY MD/QHP: HCPCS | Performed by: INTERNAL MEDICINE

## 2020-03-27 RX ORDER — CEPHALEXIN 500 MG/1
500 CAPSULE ORAL 2 TIMES DAILY
Qty: 14 CAPSULE | Refills: 0 | Status: SHIPPED | OUTPATIENT
Start: 2020-03-27 | End: 2020-05-14

## 2020-03-27 NOTE — PROGRESS NOTES
"Bonny Raymundo is a 74 year old female who is being evaluated via a telephone visit.      The patient has been notified of following (by M.A) Anahi Godinez CMA       \"We have found that certain health care needs can be provided without the need for a physical exam.  This service lets us provide the care you need with a short phone conversation.  If a prescription is necessary we can send it directly to your pharmacy.  If lab work is needed we can place an order for that and you can then stop by our lab to have the test done at a later time.    This telephone visit will be conducted via 3 way call with the you (the patient) , the physician/provider, and a me all on the line at the same time.  This allows your physician/provider to have the phone conversation with you while I will be taking notes for your medical record.  We will have full access to your [place] medical record during this entire phone call.    Since this is like an office visit,  will bill your insurance company for this service.  Please check with your medical insurance if this type of telephone/virtual is covered . You may be responsible for the cost of this service if insurance coverage is denied.  The typical cost is $30 (10min), $59(11-20min) and $85 (21-30min)     If during the course of the call the physician/provider feels a telephone visit is not appropriate, you will not be charged for this service\"    Consent has been obtained for this service by care team member: yes.  See the scanned image in the medical record.      Provider notes:    Patient is concerned about developing dysuria and urinary frequency.  Denies gross hematuria.  No fever/chills, nausea/vomiting, abdominal pain, diarrhea.  She had a urinary tract infection on 1/24/2020 and also last year on 8/27/2019.  Urine culture from 1/24/2020 showed mixed urogenital baldemar while the one from August of last year showed E. coli which was resistant to ampicillin but sensitive to the " rest of the antibiotics on the panel.  She was previously prescribed cephalexin which was effective and well-tolerated.  She is immunocompromised by virtue of her pancytopenia and cyclosporine treatment.    Patient was advised to contact us if her urination symptoms persist.    She was also advised to seek immediate medical attention if she develops any fever/chills, vomiting, abdominal pain, blood in the urine.      Assessment/Plan:      ICD-10-CM    1. Acute cystitis without hematuria  N30.00 cephALEXin (KEFLEX) 500 MG capsule       Total time spent with the patient over the phone was 5 minutes.      Dr. Dmitry Gonzalez

## 2020-05-07 DIAGNOSIS — D61.3 IDIOPATHIC APLASTIC ANEMIA (H): Primary | ICD-10-CM

## 2020-05-11 ENCOUNTER — INFUSION THERAPY VISIT (OUTPATIENT)
Dept: INFUSION THERAPY | Facility: CLINIC | Age: 75
End: 2020-05-11
Attending: INTERNAL MEDICINE
Payer: COMMERCIAL

## 2020-05-11 ENCOUNTER — HOSPITAL ENCOUNTER (OUTPATIENT)
Facility: CLINIC | Age: 75
Setting detail: SPECIMEN
Discharge: HOME | End: 2020-05-11
Attending: INTERNAL MEDICINE | Admitting: INTERNAL MEDICINE
Payer: COMMERCIAL

## 2020-05-11 DIAGNOSIS — D61.3 IDIOPATHIC APLASTIC ANEMIA (H): ICD-10-CM

## 2020-05-11 DIAGNOSIS — C50.919 METASTATIC BREAST CANCER: Primary | ICD-10-CM

## 2020-05-11 LAB
ALBUMIN SERPL-MCNC: 3.2 G/DL (ref 3.4–5)
ALBUMIN SERPL-MCNC: NORMAL G/DL (ref 3.4–5)
ALP SERPL-CCNC: 64 U/L (ref 40–150)
ALP SERPL-CCNC: NORMAL U/L (ref 40–150)
ALT SERPL W P-5'-P-CCNC: 21 U/L (ref 0–50)
ALT SERPL W P-5'-P-CCNC: NORMAL U/L (ref 0–50)
ANION GAP SERPL CALCULATED.3IONS-SCNC: 6 MMOL/L (ref 3–14)
ANION GAP SERPL CALCULATED.3IONS-SCNC: NORMAL MMOL/L (ref 6–17)
AST SERPL W P-5'-P-CCNC: 16 U/L (ref 0–45)
AST SERPL W P-5'-P-CCNC: NORMAL U/L (ref 0–45)
BASOPHILS # BLD AUTO: 0 10E9/L (ref 0–0.2)
BASOPHILS # BLD AUTO: NORMAL 10E9/L (ref 0–0.2)
BASOPHILS NFR BLD AUTO: 0.2 %
BASOPHILS NFR BLD AUTO: NORMAL %
BILIRUB SERPL-MCNC: 1.1 MG/DL (ref 0.2–1.3)
BILIRUB SERPL-MCNC: NORMAL MG/DL (ref 0.2–1.3)
BUN SERPL-MCNC: 52 MG/DL (ref 7–30)
BUN SERPL-MCNC: NORMAL MG/DL (ref 7–30)
CALCIUM SERPL-MCNC: 9 MG/DL (ref 8.5–10.1)
CALCIUM SERPL-MCNC: NORMAL MG/DL (ref 8.5–10.1)
CHLORIDE SERPL-SCNC: 111 MMOL/L (ref 94–109)
CHLORIDE SERPL-SCNC: NORMAL MMOL/L (ref 94–109)
CO2 SERPL-SCNC: 22 MMOL/L (ref 20–32)
CO2 SERPL-SCNC: NORMAL MMOL/L (ref 20–32)
CREAT SERPL-MCNC: 1.9 MG/DL (ref 0.52–1.04)
CREAT SERPL-MCNC: NORMAL MG/DL (ref 0.52–1.04)
CYCLOSPORINE BLD LC/MS/MS-MCNC: NORMAL UG/L (ref 50–400)
DIFFERENTIAL METHOD BLD: ABNORMAL
DIFFERENTIAL METHOD BLD: NORMAL
EOSINOPHIL # BLD AUTO: 0.1 10E9/L (ref 0–0.7)
EOSINOPHIL # BLD AUTO: NORMAL 10E9/L (ref 0–0.7)
EOSINOPHIL NFR BLD AUTO: 3.2 %
EOSINOPHIL NFR BLD AUTO: NORMAL %
ERYTHROCYTE [DISTWIDTH] IN BLOOD BY AUTOMATED COUNT: 16.2 % (ref 10–15)
ERYTHROCYTE [DISTWIDTH] IN BLOOD BY AUTOMATED COUNT: NORMAL % (ref 10–15)
GFR SERPL CREATININE-BSD FRML MDRD: 25 ML/MIN/{1.73_M2}
GFR SERPL CREATININE-BSD FRML MDRD: NORMAL ML/MIN/{1.73_M2}
GLUCOSE SERPL-MCNC: 119 MG/DL (ref 70–99)
GLUCOSE SERPL-MCNC: NORMAL MG/DL (ref 70–99)
HCT VFR BLD AUTO: 28.8 % (ref 35–47)
HCT VFR BLD AUTO: NORMAL % (ref 35–47)
HGB BLD-MCNC: 9.3 G/DL (ref 11.7–15.7)
HGB BLD-MCNC: NORMAL G/DL (ref 11.7–15.7)
IMM GRANULOCYTES # BLD: 0 10E9/L (ref 0–0.4)
IMM GRANULOCYTES # BLD: NORMAL 10E9/L (ref 0–0.4)
IMM GRANULOCYTES NFR BLD: 0 %
IMM GRANULOCYTES NFR BLD: NORMAL %
LYMPHOCYTES # BLD AUTO: 1.5 10E9/L (ref 0.8–5.3)
LYMPHOCYTES # BLD AUTO: NORMAL 10E9/L (ref 0.8–5.3)
LYMPHOCYTES NFR BLD AUTO: 33.7 %
LYMPHOCYTES NFR BLD AUTO: NORMAL %
MCH RBC QN AUTO: 34.6 PG (ref 26.5–33)
MCH RBC QN AUTO: NORMAL PG (ref 26.5–33)
MCHC RBC AUTO-ENTMCNC: 32.3 G/DL (ref 31.5–36.5)
MCHC RBC AUTO-ENTMCNC: NORMAL G/DL (ref 31.5–36.5)
MCV RBC AUTO: 107 FL (ref 78–100)
MCV RBC AUTO: NORMAL FL (ref 78–100)
MONOCYTES # BLD AUTO: 0.5 10E9/L (ref 0–1.3)
MONOCYTES # BLD AUTO: NORMAL 10E9/L (ref 0–1.3)
MONOCYTES NFR BLD AUTO: 11.7 %
MONOCYTES NFR BLD AUTO: NORMAL %
NEUTROPHILS # BLD AUTO: 2.2 10E9/L (ref 1.6–8.3)
NEUTROPHILS # BLD AUTO: NORMAL 10E9/L (ref 1.6–8.3)
NEUTROPHILS NFR BLD AUTO: 51.2 %
NEUTROPHILS NFR BLD AUTO: NORMAL %
NRBC # BLD AUTO: 0 10*3/UL
NRBC # BLD AUTO: NORMAL 10*3/UL
NRBC BLD AUTO-RTO: 1 /100
NRBC BLD AUTO-RTO: NORMAL /100
PLATELET # BLD AUTO: 112 10E9/L (ref 150–450)
PLATELET # BLD AUTO: NORMAL 10E9/L (ref 150–450)
POTASSIUM SERPL-SCNC: 4.1 MMOL/L (ref 3.4–5.3)
POTASSIUM SERPL-SCNC: NORMAL MMOL/L (ref 3.4–5.3)
PROT SERPL-MCNC: 7.5 G/DL (ref 6.8–8.8)
PROT SERPL-MCNC: NORMAL G/DL (ref 6.8–8.8)
RBC # BLD AUTO: 2.69 10E12/L (ref 3.8–5.2)
RBC # BLD AUTO: NORMAL 10E12/L (ref 3.8–5.2)
SODIUM SERPL-SCNC: 139 MMOL/L (ref 133–144)
SODIUM SERPL-SCNC: NORMAL MMOL/L (ref 133–144)
TME LAST DOSE: NORMAL H
WBC # BLD AUTO: 4.4 10E9/L (ref 4–11)
WBC # BLD AUTO: NORMAL 10E9/L (ref 4–11)

## 2020-05-11 PROCEDURE — 85025 COMPLETE CBC W/AUTO DIFF WBC: CPT | Performed by: INTERNAL MEDICINE

## 2020-05-11 PROCEDURE — 36415 COLL VENOUS BLD VENIPUNCTURE: CPT

## 2020-05-11 PROCEDURE — 80158 DRUG ASSAY CYCLOSPORINE: CPT | Performed by: INTERNAL MEDICINE

## 2020-05-11 PROCEDURE — 80053 COMPREHEN METABOLIC PANEL: CPT | Performed by: INTERNAL MEDICINE

## 2020-05-12 LAB
CYCLOSPORINE BLD LC/MS/MS-MCNC: 161 UG/L (ref 50–400)
TME LAST DOSE: NORMAL H

## 2020-05-14 ENCOUNTER — VIRTUAL VISIT (OUTPATIENT)
Dept: TRANSPLANT | Facility: CLINIC | Age: 75
End: 2020-05-14
Attending: INTERNAL MEDICINE
Payer: COMMERCIAL

## 2020-05-14 DIAGNOSIS — D61.3 IDIOPATHIC APLASTIC ANEMIA (H): ICD-10-CM

## 2020-05-14 DIAGNOSIS — D61.818 PANCYTOPENIA (H): ICD-10-CM

## 2020-05-14 DIAGNOSIS — D61.9 APLASTIC ANEMIA (H): ICD-10-CM

## 2020-05-14 DIAGNOSIS — K21.9 GASTROESOPHAGEAL REFLUX DISEASE WITHOUT ESOPHAGITIS: ICD-10-CM

## 2020-05-14 RX ORDER — CYCLOSPORINE 25 MG/1
50 CAPSULE, LIQUID FILLED ORAL 2 TIMES DAILY
Qty: 540 CAPSULE | Refills: 2 | Status: SHIPPED | OUTPATIENT
Start: 2020-05-14 | End: 2020-08-13

## 2020-05-14 RX ORDER — CYCLOSPORINE 25 MG/1
50 CAPSULE, LIQUID FILLED ORAL 2 TIMES DAILY
Qty: 540 CAPSULE | Refills: 2 | COMMUNITY
Start: 2020-05-14 | End: 2020-05-14

## 2020-05-14 RX ORDER — PANTOPRAZOLE SODIUM 20 MG/1
20 TABLET, DELAYED RELEASE ORAL DAILY
Qty: 90 TABLET | Refills: 11 | Status: SHIPPED | OUTPATIENT
Start: 2020-05-14 | End: 2021-06-03

## 2020-05-14 NOTE — PROGRESS NOTES
"Bonny Raymundo is a 74 year old female who is being evaluated via a billable telephone visit.      The patient has been notified of following:     \"This telephone visit will be conducted via a call between you and your physician/provider. We have found that certain health care needs can be provided without the need for a physical exam.  This service lets us provide the care you need with a short phone conversation.  If a prescription is necessary we can send it directly to your pharmacy.  If lab work is needed we can place an order for that and you can then stop by our lab to have the test done at a later time.    Telephone visits are billed at different rates depending on your insurance coverage. During this emergency period, for some insurers they may be billed the same as an in-person visit.  Please reach out to your insurance provider with any questions.    If during the course of the call the physician/provider feels a telephone visit is not appropriate, you will not be charged for this service.\"    Patient has given verbal consent for Telephone visit?  Yes    What phone number would you like to be contacted at? [phone]    How would you like to obtain your AVS? Mail a copy    Phone call duration: 25 minutes  *Pt needs refill of pantoprazole & cycloSPORINE    Mirella Dangelo CMA on 5/14/2020 at 4:00 PM    This is a 25-minute telephone visit with Bonny to follow-up her aplastic anemia.  She is generally been feeling well.  She had a UTI several months ago which has not recurred.  She had had episodic trouble with her GI tract and tried Metamucil but it did not help so she discontinued it.  She does continue to eat without much restriction.  She has had no fever chills rash bleeding or bruising.  She has no new areas of bone soreness.  She has no peripheral edema.  She has some stuffy nose which she thinks are related to allergies, not infection and we discussed nonsedating antihistamines like Allegra or " Claritin that she might try.    She takes Benadryl sometimes at night to help her sleep.  She has a little bit of tremor, likely attributable to the cyclosporine but no other acute problems.  She generally feels well.    Laboratory testing showed good blood counts and chemistries.  She has a better platelet count than she usually has but it is in the same range as it is been for some time.    Her creatinine is worse up to 1.90 is high as it is been since last October and we made another adjustment in her cyclosporine level, reducing her dose to 50 mg twice daily with hopes that her renal function and her associated mild tremor will be diminished.    We will check her lab tests in 3 weeks (the second week of June) and she will be seen again with labs as well in 3 months time.  She knows to call if additional problems arise.    Rafita Rogel MD    Professor of medicine    Results for LISSETH PARIKH (MRN [id number]) as of 5/14/2020 20:37   Ref. Range 5/11/2020 11:44 5/11/2020 11:44   Sodium Latest Ref Range: 133 - 144 mmol/L Canceled, Test credited 139   Potassium Latest Ref Range: 3.4 - 5.3 mmol/L Canceled, Test credited 4.1   Chloride Latest Ref Range: 94 - 109 mmol/L Canceled, Test credited 111 (H)   Carbon Dioxide Latest Ref Range: 20 - 32 mmol/L Canceled, Test credited 22   Urea Nitrogen Latest Ref Range: 7 - 30 mg/dL Canceled, Test credited 52 (H)   Creatinine Latest Ref Range: 0.52 - 1.04 mg/dL Canceled, Test credited 1.90 (H)   GFR Estimate Latest Ref Range: >60 mL/min/1.73_m2 Canceled, Test credited 25 (L)   GFR Estimate If Black Latest Ref Range: >60 mL/min/1.73_m2 Canceled, Test credited 29 (L)   Calcium Latest Ref Range: 8.5 - 10.1 mg/dL Canceled, Test credited 9.0   Anion Gap Latest Ref Range: 3 - 14 mmol/L Canceled, Test credited 6   Albumin Latest Ref Range: 3.4 - 5.0 g/dL Canceled, Test credited 3.2 (L)   Protein Total Latest Ref Range: 6.8 - 8.8 g/dL Canceled, Test credited 7.5   Bilirubin Total  Latest Ref Range: 0.2 - 1.3 mg/dL Canceled, Test credited 1.1   Alkaline Phosphatase Latest Ref Range: 40 - 150 U/L Canceled, Test credited 64   ALT Latest Ref Range: 0 - 50 U/L Canceled, Test credited 21   AST Latest Ref Range: 0 - 45 U/L Canceled, Test credited 16   Glucose Latest Ref Range: 70 - 99 mg/dL Canceled, Test credited 119 (H)   WBC Latest Ref Range: 4.0 - 11.0 10e9/L Canceled, Test credited 4.4   Hemoglobin Latest Ref Range: 11.7 - 15.7 g/dL Canceled, Test credited 9.3 (L)   Hematocrit Latest Ref Range: 35.0 - 47.0 % Canceled, Test credited 28.8 (L)   Platelet Count Latest Ref Range: 150 - 450 10e9/L Canceled, Test credited 112 (L)   RBC Count Latest Ref Range: 3.8 - 5.2 10e12/L Canceled, Test credited 2.69 (L)   MCV Latest Ref Range: 78 - 100 fl Canceled, Test credited 107 (H)   MCH Latest Ref Range: 26.5 - 33.0 pg Canceled, Test credited 34.6 (H)   MCHC Latest Ref Range: 31.5 - 36.5 g/dL Canceled, Test credited 32.3   RDW Latest Ref Range: 10.0 - 15.0 % Canceled, Test credited 16.2 (H)   Diff Method Unknown Canceled, Test credited Automated Method   % Neutrophils Latest Units: % Canceled, Test credited 51.2   % Lymphocytes Latest Units: % Canceled, Test credited 33.7   % Monocytes Latest Units: % Canceled, Test credited 11.7   % Eosinophils Latest Units: % Canceled, Test credited 3.2   % Basophils Latest Units: % Canceled, Test credited 0.2   % Immature Granulocytes Latest Units: % Canceled, Test credited 0.0   Nucleated RBCs Latest Ref Range: 0 /100 Canceled, Test credited 1 (H)   Absolute Neutrophil Latest Ref Range: 1.6 - 8.3 10e9/L Canceled, Test credited 2.2   Absolute Lymphocytes Latest Ref Range: 0.8 - 5.3 10e9/L Canceled, Test credited 1.5   Absolute Monocytes Latest Ref Range: 0.0 - 1.3 10e9/L Canceled, Test credited 0.5   Absolute Eosinophils Latest Ref Range: 0.0 - 0.7 10e9/L Canceled, Test credited 0.1   Absolute Basophils Latest Ref Range: 0.0 - 0.2 10e9/L Canceled, Test credited 0.0    Abs Immature Granulocytes Latest Ref Range: 0 - 0.4 10e9/L Canceled, Test credited 0.0   Absolute Nucleated RBC Unknown Canceled, Test credited 0.0   Cyclosporine Last Dose Unknown Canceled, Test credited LAST DOSE UNKNOWN   Cyclosporine Level Latest Ref Range: 50 - 400 ug/L Canceled, Test credited 161

## 2020-05-14 NOTE — LETTER
"5/14/2020       RE: Bonny Raymundo  [address]  [facility] [address]     Dear Colleague,    Thank you for referring your patient, Bonny Raymundo, to the [facility] at [facility]. Please see a copy of my visit note below.    Bonny Raymundo is a 74 year old female who is being evaluated via a billable telephone visit.      The patient has been notified of following:     \"This telephone visit will be conducted via a call between you and your physician/provider. We have found that certain health care needs can be provided without the need for a physical exam.  This service lets us provide the care you need with a short phone conversation.  If a prescription is necessary we can send it directly to your pharmacy.  If lab work is needed we can place an order for that and you can then stop by our lab to have the test done at a later time.    Telephone visits are billed at different rates depending on your insurance coverage. During this emergency period, for some insurers they may be billed the same as an in-person visit.  Please reach out to your insurance provider with any questions.    If during the course of the call the physician/provider feels a telephone visit is not appropriate, you will not be charged for this service.\"    Patient has given verbal consent for Telephone visit?  Yes    What phone number would you like to be contacted at? [phone]    How would you like to obtain your AVS? Mail a copy    Phone call duration: 25 minutes  *Pt needs refill of pantoprazole & cycloSPORINE    Mirella Dangelo CMA on 5/14/2020 at 4:00 PM    This is a 25-minute telephone visit with Bonny to follow-up her aplastic anemia.  She is generally been feeling well.  She had a UTI several months ago which has not recurred.  She had had episodic trouble with her GI tract and tried Metamucil but it did not help so she discontinued it.  She does continue to eat " without much restriction.  She has had no fever chills rash bleeding or bruising.  She has no new areas of bone soreness.  She has no peripheral edema.  She has some stuffy nose which she thinks are related to allergies, not infection and we discussed nonsedating antihistamines like Allegra or Claritin that she might try.    She takes Benadryl sometimes at night to help her sleep.  She has a little bit of tremor, likely attributable to the cyclosporine but no other acute problems.  She generally feels well.    Laboratory testing showed good blood counts and chemistries.  She has a better platelet count than she usually has but it is in the same range as it is been for some time.    Her creatinine is worse up to 1.90 is high as it is been since last October and we made another adjustment in her cyclosporine level, reducing her dose to 50 mg twice daily with hopes that her renal function and her associated mild tremor will be diminished.    We will check her lab tests in 3 weeks (the second week of June) and she will be seen again with labs as well in 3 months time.  She knows to call if additional problems arise.    Rafita Rogel MD    Professor of medicine    Results for LISSETH PAIRKH (MRN [id number]) as of 5/14/2020 20:37   Ref. Range 5/11/2020 11:44 5/11/2020 11:44   Sodium Latest Ref Range: 133 - 144 mmol/L Canceled, Test credited 139   Potassium Latest Ref Range: 3.4 - 5.3 mmol/L Canceled, Test credited 4.1   Chloride Latest Ref Range: 94 - 109 mmol/L Canceled, Test credited 111 (H)   Carbon Dioxide Latest Ref Range: 20 - 32 mmol/L Canceled, Test credited 22   Urea Nitrogen Latest Ref Range: 7 - 30 mg/dL Canceled, Test credited 52 (H)   Creatinine Latest Ref Range: 0.52 - 1.04 mg/dL Canceled, Test credited 1.90 (H)   GFR Estimate Latest Ref Range: >60 mL/min/1.73_m2 Canceled, Test credited 25 (L)   GFR Estimate If Black Latest Ref Range: >60 mL/min/1.73_m2 Canceled, Test credited 29 (L)   Calcium Latest Ref  Range: 8.5 - 10.1 mg/dL Canceled, Test credited 9.0   Anion Gap Latest Ref Range: 3 - 14 mmol/L Canceled, Test credited 6   Albumin Latest Ref Range: 3.4 - 5.0 g/dL Canceled, Test credited 3.2 (L)   Protein Total Latest Ref Range: 6.8 - 8.8 g/dL Canceled, Test credited 7.5   Bilirubin Total Latest Ref Range: 0.2 - 1.3 mg/dL Canceled, Test credited 1.1   Alkaline Phosphatase Latest Ref Range: 40 - 150 U/L Canceled, Test credited 64   ALT Latest Ref Range: 0 - 50 U/L Canceled, Test credited 21   AST Latest Ref Range: 0 - 45 U/L Canceled, Test credited 16   Glucose Latest Ref Range: 70 - 99 mg/dL Canceled, Test credited 119 (H)   WBC Latest Ref Range: 4.0 - 11.0 10e9/L Canceled, Test credited 4.4   Hemoglobin Latest Ref Range: 11.7 - 15.7 g/dL Canceled, Test credited 9.3 (L)   Hematocrit Latest Ref Range: 35.0 - 47.0 % Canceled, Test credited 28.8 (L)   Platelet Count Latest Ref Range: 150 - 450 10e9/L Canceled, Test credited 112 (L)   RBC Count Latest Ref Range: 3.8 - 5.2 10e12/L Canceled, Test credited 2.69 (L)   MCV Latest Ref Range: 78 - 100 fl Canceled, Test credited 107 (H)   MCH Latest Ref Range: 26.5 - 33.0 pg Canceled, Test credited 34.6 (H)   MCHC Latest Ref Range: 31.5 - 36.5 g/dL Canceled, Test credited 32.3   RDW Latest Ref Range: 10.0 - 15.0 % Canceled, Test credited 16.2 (H)   Diff Method Unknown Canceled, Test credited Automated Method   % Neutrophils Latest Units: % Canceled, Test credited 51.2   % Lymphocytes Latest Units: % Canceled, Test credited 33.7   % Monocytes Latest Units: % Canceled, Test credited 11.7   % Eosinophils Latest Units: % Canceled, Test credited 3.2   % Basophils Latest Units: % Canceled, Test credited 0.2   % Immature Granulocytes Latest Units: % Canceled, Test credited 0.0   Nucleated RBCs Latest Ref Range: 0 /100 Canceled, Test credited 1 (H)   Absolute Neutrophil Latest Ref Range: 1.6 - 8.3 10e9/L Canceled, Test credited 2.2   Absolute Lymphocytes Latest Ref Range: 0.8 - 5.3  10e9/L Canceled, Test credited 1.5   Absolute Monocytes Latest Ref Range: 0.0 - 1.3 10e9/L Canceled, Test credited 0.5   Absolute Eosinophils Latest Ref Range: 0.0 - 0.7 10e9/L Canceled, Test credited 0.1   Absolute Basophils Latest Ref Range: 0.0 - 0.2 10e9/L Canceled, Test credited 0.0   Abs Immature Granulocytes Latest Ref Range: 0 - 0.4 10e9/L Canceled, Test credited 0.0   Absolute Nucleated RBC Unknown Canceled, Test credited 0.0   Cyclosporine Last Dose Unknown Canceled, Test credited LAST DOSE UNKNOWN   Cyclosporine Level Latest Ref Range: 50 - 400 ug/L Canceled, Test credited 161       Again, thank you for allowing me to participate in the care of your patient.      Sincerely,    Rafita Rogel MD

## 2020-07-16 ENCOUNTER — TRANSFERRED RECORDS (OUTPATIENT)
Dept: HEALTH INFORMATION MANAGEMENT | Facility: CLINIC | Age: 75
End: 2020-07-16

## 2020-08-13 ENCOUNTER — APPOINTMENT (OUTPATIENT)
Dept: LAB | Facility: CLINIC | Age: 75
End: 2020-08-13
Attending: INTERNAL MEDICINE
Payer: COMMERCIAL

## 2020-08-13 ENCOUNTER — OFFICE VISIT (OUTPATIENT)
Dept: TRANSPLANT | Facility: CLINIC | Age: 75
End: 2020-08-13
Attending: INTERNAL MEDICINE
Payer: COMMERCIAL

## 2020-08-13 VITALS
RESPIRATION RATE: 16 BRPM | DIASTOLIC BLOOD PRESSURE: 76 MMHG | BODY MASS INDEX: 24.33 KG/M2 | TEMPERATURE: 97.7 F | HEART RATE: 85 BPM | OXYGEN SATURATION: 98 % | WEIGHT: 124.6 LBS | SYSTOLIC BLOOD PRESSURE: 131 MMHG

## 2020-08-13 DIAGNOSIS — D61.9 APLASTIC ANEMIA (H): ICD-10-CM

## 2020-08-13 DIAGNOSIS — D61.818 PANCYTOPENIA (H): ICD-10-CM

## 2020-08-13 DIAGNOSIS — D61.3 IDIOPATHIC APLASTIC ANEMIA (H): ICD-10-CM

## 2020-08-13 LAB
ALBUMIN SERPL-MCNC: 3.1 G/DL (ref 3.4–5)
ALP SERPL-CCNC: 73 U/L (ref 40–150)
ALT SERPL W P-5'-P-CCNC: 19 U/L (ref 0–50)
ANION GAP SERPL CALCULATED.3IONS-SCNC: 6 MMOL/L (ref 3–14)
AST SERPL W P-5'-P-CCNC: 12 U/L (ref 0–45)
BASOPHILS # BLD AUTO: 0 10E9/L (ref 0–0.2)
BASOPHILS NFR BLD AUTO: 0.3 %
BILIRUB SERPL-MCNC: 1.3 MG/DL (ref 0.2–1.3)
BUN SERPL-MCNC: 44 MG/DL (ref 7–30)
CALCIUM SERPL-MCNC: 8.7 MG/DL (ref 8.5–10.1)
CHLORIDE SERPL-SCNC: 110 MMOL/L (ref 94–109)
CO2 SERPL-SCNC: 24 MMOL/L (ref 20–32)
CREAT SERPL-MCNC: 2.03 MG/DL (ref 0.52–1.04)
CYCLOSPORINE BLD LC/MS/MS-MCNC: 179 UG/L (ref 50–400)
DIFFERENTIAL METHOD BLD: ABNORMAL
EOSINOPHIL # BLD AUTO: 0.2 10E9/L (ref 0–0.7)
EOSINOPHIL NFR BLD AUTO: 4.5 %
ERYTHROCYTE [DISTWIDTH] IN BLOOD BY AUTOMATED COUNT: 18 % (ref 10–15)
GFR SERPL CREATININE-BSD FRML MDRD: 23 ML/MIN/{1.73_M2}
GLUCOSE SERPL-MCNC: 109 MG/DL (ref 70–99)
HCT VFR BLD AUTO: 28.1 % (ref 35–47)
HGB BLD-MCNC: 8.8 G/DL (ref 11.7–15.7)
IMM GRANULOCYTES # BLD: 0 10E9/L (ref 0–0.4)
IMM GRANULOCYTES NFR BLD: 0.3 %
LYMPHOCYTES # BLD AUTO: 1.3 10E9/L (ref 0.8–5.3)
LYMPHOCYTES NFR BLD AUTO: 40.2 %
MAGNESIUM SERPL-MCNC: 2.4 MG/DL (ref 1.6–2.3)
MCH RBC QN AUTO: 30.8 PG (ref 26.5–33)
MCHC RBC AUTO-ENTMCNC: 31.3 G/DL (ref 31.5–36.5)
MCV RBC AUTO: 98 FL (ref 78–100)
MONOCYTES # BLD AUTO: 0.6 10E9/L (ref 0–1.3)
MONOCYTES NFR BLD AUTO: 18.9 %
NEUTROPHILS # BLD AUTO: 1.2 10E9/L (ref 1.6–8.3)
NEUTROPHILS NFR BLD AUTO: 35.8 %
NRBC # BLD AUTO: 0.1 10*3/UL
NRBC BLD AUTO-RTO: 2 /100
PLATELET # BLD AUTO: 108 10E9/L (ref 150–450)
PLATELET # BLD EST: ABNORMAL 10*3/UL
POTASSIUM SERPL-SCNC: 4.8 MMOL/L (ref 3.4–5.3)
PROT SERPL-MCNC: 7.5 G/DL (ref 6.8–8.8)
RBC # BLD AUTO: 2.86 10E12/L (ref 3.8–5.2)
SODIUM SERPL-SCNC: 140 MMOL/L (ref 133–144)
TME LAST DOSE: NORMAL H
WBC # BLD AUTO: 3.3 10E9/L (ref 4–11)

## 2020-08-13 PROCEDURE — 80158 DRUG ASSAY CYCLOSPORINE: CPT | Performed by: INTERNAL MEDICINE

## 2020-08-13 PROCEDURE — 36415 COLL VENOUS BLD VENIPUNCTURE: CPT

## 2020-08-13 PROCEDURE — 85025 COMPLETE CBC W/AUTO DIFF WBC: CPT | Performed by: INTERNAL MEDICINE

## 2020-08-13 PROCEDURE — 83735 ASSAY OF MAGNESIUM: CPT | Performed by: INTERNAL MEDICINE

## 2020-08-13 PROCEDURE — G0463 HOSPITAL OUTPT CLINIC VISIT: HCPCS | Mod: ZF

## 2020-08-13 PROCEDURE — 80053 COMPREHEN METABOLIC PANEL: CPT | Performed by: INTERNAL MEDICINE

## 2020-08-13 RX ORDER — CYCLOSPORINE 25 MG/1
25 CAPSULE, LIQUID FILLED ORAL 2 TIMES DAILY
Qty: 540 CAPSULE | Refills: 2 | COMMUNITY
Start: 2020-08-13 | End: 2021-05-06

## 2020-08-13 ASSESSMENT — PAIN SCALES - GENERAL: PAINLEVEL: NO PAIN (0)

## 2020-08-13 NOTE — PROGRESS NOTES
/76   Pulse 85   Temp 97.7  F (36.5  C) (Oral)   Resp 16   Wt 56.5 kg (124 lb 9.6 oz)   SpO2 98%   BMI 24.33 kg/m  /  Wt Readings from Last 4 Encounters:   08/13/20 56.5 kg (124 lb 9.6 oz)   01/24/20 56.7 kg (125 lb)   01/16/20 56.7 kg (125 lb)   11/20/19 56.3 kg (124 lb 1.6 oz)   '  Bonny returns to follow-up her aplastic anemia.  She has been more fatigued in the last few months.  She is walking her dog shorter distances some because she is getting tired; and  maybe because she is getting out of breath but she does not notice breathlessness at other times.  She is napping more during the day and therefore having some trouble sleeping at night.    She has no GI  specific respiratory  ENT or neurologic complaints.  Her hearing is worse than usual today.  She has had no recent infections or bleeding and has no specific new areas of pain or discomfort except her back seems to be having even more tendency to have her hunched forward so she is wearing a bit of a more elastic brace which she calls a harness to keep her more upright.  The rest of her review of systems is unrevealing.    Her exam shows her weight unchanged in months. Her blood pressure is acceptable and her vital signs are otherwise unrevealing.  She has no bruises or petechiae.  Oropharynx is clear without mucosal changes.  She has no inflammatory skin rash.  Her lungs are clear without rales or wheezes and her heart tones are regular with occasional extrasystoles but no notable murmur.  Her abdomen is soft and nontender without hepatosplenomegaly or masses.    Her creatinine is up to 2.03 and she is a bit more anemic than usual.  We will further reduce her cyclosporine down to 25 mg twice daily and not worry about the level.  Her other chemistries are unremarkable.  Her CSA level is 179; which is therapeutic, but is also too high for her.  We will not monitor CSA levels any more with this low dose therapy.    Because of the worsening renal  function she may have future intolerance of cyclosporine altogether.  So we will check her labs in 4 weeks and see her again in 8 weeks.    We also discussed that she is not having any heartburn and she asked whether she can discontinue her pantoprazole.  I said is not a medicine that works if you take it episodically so if she decides to stop (which would be safe) she can take Tums or Mylanta or famotidine (Pepcid) which she can get over-the-counter for periodic heartburn or if it was persistent, she could restart the pantoprazole.    I am hopeful that her renal function will further improve but her blood counts are safe and stable.    Rafita Rogel MD    Professor of medicine    Results for LISSETH PARIKH (MRN [id number]) as of 8/13/2020 20:45   Ref. Range 5/11/2020 11:44 8/13/2020 15:43 8/13/2020 15:44   Sodium Latest Ref Range: 133 - 144 mmol/L 139  140   Potassium Latest Ref Range: 3.4 - 5.3 mmol/L 4.1  4.8   Chloride Latest Ref Range: 94 - 109 mmol/L 111 (H)  110 (H)   Carbon Dioxide Latest Ref Range: 20 - 32 mmol/L 22  24   Urea Nitrogen Latest Ref Range: 7 - 30 mg/dL 52 (H)  44 (H)   Creatinine Latest Ref Range: 0.52 - 1.04 mg/dL 1.90 (H)  2.03 (H)   GFR Estimate Latest Ref Range: >60 mL/min/1.73_m2 25 (L)  23 (L)   GFR Estimate If Black Latest Ref Range: >60 mL/min/1.73_m2 29 (L)  27 (L)   Calcium Latest Ref Range: 8.5 - 10.1 mg/dL 9.0  8.7   Anion Gap Latest Ref Range: 3 - 14 mmol/L 6  6   Magnesium Latest Ref Range: 1.6 - 2.3 mg/dL   2.4 (H)   Albumin Latest Ref Range: 3.4 - 5.0 g/dL 3.2 (L)  3.1 (L)   Protein Total Latest Ref Range: 6.8 - 8.8 g/dL 7.5  7.5   Bilirubin Total Latest Ref Range: 0.2 - 1.3 mg/dL 1.1  1.3   Alkaline Phosphatase Latest Ref Range: 40 - 150 U/L 64  73   ALT Latest Ref Range: 0 - 50 U/L 21  19   AST Latest Ref Range: 0 - 45 U/L 16  12   Glucose Latest Ref Range: 70 - 99 mg/dL 119 (H)  109 (H)   WBC Latest Ref Range: 4.0 - 11.0 10e9/L 4.4  3.3 (L)   Hemoglobin Latest Ref Range:  11.7 - 15.7 g/dL 9.3 (L)  8.8 (L)   Hematocrit Latest Ref Range: 35.0 - 47.0 % 28.8 (L)  28.1 (L)   Platelet Count Latest Ref Range: 150 - 450 10e9/L 112 (L)  108 (L)   RBC Count Latest Ref Range: 3.8 - 5.2 10e12/L 2.69 (L)  2.86 (L)   MCV Latest Ref Range: 78 - 100 fl 107 (H)  98   MCH Latest Ref Range: 26.5 - 33.0 pg 34.6 (H)  30.8   MCHC Latest Ref Range: 31.5 - 36.5 g/dL 32.3  31.3 (L)   RDW Latest Ref Range: 10.0 - 15.0 % 16.2 (H)  18.0 (H)   Diff Method Unknown Automated Method  Automated Method   % Neutrophils Latest Units: % 51.2  35.8   % Lymphocytes Latest Units: % 33.7  40.2   % Monocytes Latest Units: % 11.7  18.9   % Eosinophils Latest Units: % 3.2  4.5   % Basophils Latest Units: % 0.2  0.3   % Immature Granulocytes Latest Units: % 0.0  0.3   Nucleated RBCs Latest Ref Range: 0 /100 1 (H)  2 (H)   Absolute Neutrophil Latest Ref Range: 1.6 - 8.3 10e9/L 2.2  1.2 (L)   Absolute Lymphocytes Latest Ref Range: 0.8 - 5.3 10e9/L 1.5  1.3   Absolute Monocytes Latest Ref Range: 0.0 - 1.3 10e9/L 0.5  0.6   Absolute Eosinophils Latest Ref Range: 0.0 - 0.7 10e9/L 0.1  0.2   Absolute Basophils Latest Ref Range: 0.0 - 0.2 10e9/L 0.0  0.0   Abs Immature Granulocytes Latest Ref Range: 0 - 0.4 10e9/L 0.0  0.0   Absolute Nucleated RBC Unknown 0.0  0.1   Platelet Estimate Unknown   Confirming automated cell count   Cyclosporine Last Dose Unknown LAST DOSE UNKNOWN 8/13 1030    Cyclosporine Level Latest Ref Range: 50 - 400 ug/L 161 179

## 2020-08-13 NOTE — NURSING NOTE
Oncology Rooming Note    August 13, 2020 4:06 PM   Bonny Raymundo is a 74 year old female who presents for:    Chief Complaint   Patient presents with     Blood Draw     Labs drawn via  by RN in lab. VS taken.      Oncology Clinic Visit     Aplastic anemia (H)     Initial Vitals: /76   Pulse 85   Temp 97.7  F (36.5  C) (Oral)   Resp 16   Wt 56.5 kg (124 lb 9.6 oz)   SpO2 98%   BMI 24.33 kg/m   Estimated body mass index is 24.33 kg/m  as calculated from the following:    Height as of 1/16/20: 1.524 m (5').    Weight as of this encounter: 56.5 kg (124 lb 9.6 oz). Body surface area is 1.55 meters squared.  No Pain (0) Comment: Data Unavailable   No LMP recorded. Patient has had a hysterectomy.  Allergies reviewed: Yes  Medications reviewed: Yes    Medications: Medication refills not needed today.  Pharmacy name entered into [facility]:    [facility], [place] - [address]  [facility] [place] [place] - [address] [facility]  [place], [place] - [address] [facility]  WRITTEN PRESCRIPTION REQUESTED  [id number] [place], [place] - [address]    Clinical concerns: No new concerns today. Dr. Rogel was notified.      Ga Mendoza MA

## 2020-08-13 NOTE — LETTER
8/13/2020         RE: Bonny Raymundo  [address]  [facility] [address]        Dear Colleague,    Thank you for referring your patient, Bonny Raymundo, to the [facility]. Please see a copy of my visit note below.    /76   Pulse 85   Temp 97.7  F (36.5  C) (Oral)   Resp 16   Wt 56.5 kg (124 lb 9.6 oz)   SpO2 98%   BMI 24.33 kg/m  /  Wt Readings from Last 4 Encounters:   08/13/20 56.5 kg (124 lb 9.6 oz)   01/24/20 56.7 kg (125 lb)   01/16/20 56.7 kg (125 lb)   11/20/19 56.3 kg (124 lb 1.6 oz)   '  Bonny returns to follow-up her aplastic anemia.  She has been more fatigued in the last few months.  She is walking her dog shorter distances some because she is getting tired; and  maybe because she is getting out of breath but she does not notice breathlessness at other times.  She is napping more during the day and therefore having some trouble sleeping at night.    She has no GI  specific respiratory  ENT or neurologic complaints.  Her hearing is worse than usual today.  She has had no recent infections or bleeding and has no specific new areas of pain or discomfort except her back seems to be having even more tendency to have her hunched forward so she is wearing a bit of a more elastic brace which she calls a harness to keep her more upright.  The rest of her review of systems is unrevealing.    Her exam shows her weight unchanged in months. Her blood pressure is acceptable and her vital signs are otherwise unrevealing.  She has no bruises or petechiae.  Oropharynx is clear without mucosal changes.  She has no inflammatory skin rash.  Her lungs are clear without rales or wheezes and her heart tones are regular with occasional extrasystoles but no notable murmur.  Her abdomen is soft and nontender without hepatosplenomegaly or masses.    Her creatinine is up to 2.03 and she is a bit more anemic than usual.  We will further reduce her cyclosporine down to 25 mg twice  daily and not worry about the level.  Her other chemistries are unremarkable.  Her CSA level is 179; which is therapeutic, but is also too high for her.  We will not monitor CSA levels any more with this low dose therapy.    Because of the worsening renal function she may have future intolerance of cyclosporine altogether.  So we will check her labs in 4 weeks and see her again in 8 weeks.    We also discussed that she is not having any heartburn and she asked whether she can discontinue her pantoprazole.  I said is not a medicine that works if you take it episodically so if she decides to stop (which would be safe) she can take Tums or Mylanta or famotidine (Pepcid) which she can get over-the-counter for periodic heartburn or if it was persistent, she could restart the pantoprazole.    I am hopeful that her renal function will further improve but her blood counts are safe and stable.    Rafita Rogel MD    Professor of medicine    Results for LISSETH PARIKH (MRN [id number]) as of 8/13/2020 20:45   Ref. Range 5/11/2020 11:44 8/13/2020 15:43 8/13/2020 15:44   Sodium Latest Ref Range: 133 - 144 mmol/L 139  140   Potassium Latest Ref Range: 3.4 - 5.3 mmol/L 4.1  4.8   Chloride Latest Ref Range: 94 - 109 mmol/L 111 (H)  110 (H)   Carbon Dioxide Latest Ref Range: 20 - 32 mmol/L 22  24   Urea Nitrogen Latest Ref Range: 7 - 30 mg/dL 52 (H)  44 (H)   Creatinine Latest Ref Range: 0.52 - 1.04 mg/dL 1.90 (H)  2.03 (H)   GFR Estimate Latest Ref Range: >60 mL/min/1.73_m2 25 (L)  23 (L)   GFR Estimate If Black Latest Ref Range: >60 mL/min/1.73_m2 29 (L)  27 (L)   Calcium Latest Ref Range: 8.5 - 10.1 mg/dL 9.0  8.7   Anion Gap Latest Ref Range: 3 - 14 mmol/L 6  6   Magnesium Latest Ref Range: 1.6 - 2.3 mg/dL   2.4 (H)   Albumin Latest Ref Range: 3.4 - 5.0 g/dL 3.2 (L)  3.1 (L)   Protein Total Latest Ref Range: 6.8 - 8.8 g/dL 7.5  7.5   Bilirubin Total Latest Ref Range: 0.2 - 1.3 mg/dL 1.1  1.3   Alkaline Phosphatase Latest Ref  Range: 40 - 150 U/L 64  73   ALT Latest Ref Range: 0 - 50 U/L 21  19   AST Latest Ref Range: 0 - 45 U/L 16  12   Glucose Latest Ref Range: 70 - 99 mg/dL 119 (H)  109 (H)   WBC Latest Ref Range: 4.0 - 11.0 10e9/L 4.4  3.3 (L)   Hemoglobin Latest Ref Range: 11.7 - 15.7 g/dL 9.3 (L)  8.8 (L)   Hematocrit Latest Ref Range: 35.0 - 47.0 % 28.8 (L)  28.1 (L)   Platelet Count Latest Ref Range: 150 - 450 10e9/L 112 (L)  108 (L)   RBC Count Latest Ref Range: 3.8 - 5.2 10e12/L 2.69 (L)  2.86 (L)   MCV Latest Ref Range: 78 - 100 fl 107 (H)  98   MCH Latest Ref Range: 26.5 - 33.0 pg 34.6 (H)  30.8   MCHC Latest Ref Range: 31.5 - 36.5 g/dL 32.3  31.3 (L)   RDW Latest Ref Range: 10.0 - 15.0 % 16.2 (H)  18.0 (H)   Diff Method Unknown Automated Method  Automated Method   % Neutrophils Latest Units: % 51.2  35.8   % Lymphocytes Latest Units: % 33.7  40.2   % Monocytes Latest Units: % 11.7  18.9   % Eosinophils Latest Units: % 3.2  4.5   % Basophils Latest Units: % 0.2  0.3   % Immature Granulocytes Latest Units: % 0.0  0.3   Nucleated RBCs Latest Ref Range: 0 /100 1 (H)  2 (H)   Absolute Neutrophil Latest Ref Range: 1.6 - 8.3 10e9/L 2.2  1.2 (L)   Absolute Lymphocytes Latest Ref Range: 0.8 - 5.3 10e9/L 1.5  1.3   Absolute Monocytes Latest Ref Range: 0.0 - 1.3 10e9/L 0.5  0.6   Absolute Eosinophils Latest Ref Range: 0.0 - 0.7 10e9/L 0.1  0.2   Absolute Basophils Latest Ref Range: 0.0 - 0.2 10e9/L 0.0  0.0   Abs Immature Granulocytes Latest Ref Range: 0 - 0.4 10e9/L 0.0  0.0   Absolute Nucleated RBC Unknown 0.0  0.1   Platelet Estimate Unknown   Confirming automated cell count   Cyclosporine Last Dose Unknown LAST DOSE UNKNOWN 8/13 1030    Cyclosporine Level Latest Ref Range: 50 - 400 ug/L 161 179        Again, thank you for allowing me to participate in the care of your patient.        Sincerely,        Rafita Rogel MD

## 2020-08-13 NOTE — LETTER
8/13/2020         RE: Bonny Raymundo  [address]  [facility] [address]      /76   Pulse 85   Temp 97.7  F (36.5  C) (Oral)   Resp 16   Wt 56.5 kg (124 lb 9.6 oz)   SpO2 98%   BMI 24.33 kg/m  /  Wt Readings from Last 4 Encounters:   08/13/20 56.5 kg (124 lb 9.6 oz)   01/24/20 56.7 kg (125 lb)   01/16/20 56.7 kg (125 lb)   11/20/19 56.3 kg (124 lb 1.6 oz)   '  Bonny returns to follow-up her aplastic anemia.  She has been more fatigued in the last few months.  She is walking her dog shorter distances some because she is getting tired; and  maybe because she is getting out of breath but she does not notice breathlessness at other times.  She is napping more during the day and therefore having some trouble sleeping at night.    She has no GI  specific respiratory  ENT or neurologic complaints.  Her hearing is worse than usual today.  She has had no recent infections or bleeding and has no specific new areas of pain or discomfort except her back seems to be having even more tendency to have her hunched forward so she is wearing a bit of a more elastic brace which she calls a harness to keep her more upright.  The rest of her review of systems is unrevealing.    Her exam shows her weight unchanged in months. Her blood pressure is acceptable and her vital signs are otherwise unrevealing.  She has no bruises or petechiae.  Oropharynx is clear without mucosal changes.  She has no inflammatory skin rash.  Her lungs are clear without rales or wheezes and her heart tones are regular with occasional extrasystoles but no notable murmur.  Her abdomen is soft and nontender without hepatosplenomegaly or masses.    Her creatinine is up to 2.03 and she is a bit more anemic than usual.  We will further reduce her cyclosporine down to 25 mg twice daily and not worry about the level.  Her other chemistries are unremarkable.  Her CSA level is 179; which is therapeutic, but is also too high for her.  We will  not monitor CSA levels any more with this low dose therapy.    Because of the worsening renal function she may have future intolerance of cyclosporine altogether.  So we will check her labs in 4 weeks and see her again in 8 weeks.    We also discussed that she is not having any heartburn and she asked whether she can discontinue her pantoprazole.  I said is not a medicine that works if you take it episodically so if she decides to stop (which would be safe) she can take Tums or Mylanta or famotidine (Pepcid) which she can get over-the-counter for periodic heartburn or if it was persistent, she could restart the pantoprazole.    I am hopeful that her renal function will further improve but her blood counts are safe and stable.    Rafita Rogel MD    Professor of medicine    Results for LISSETH PARIKH (MRN [id number]) as of 8/13/2020 20:45   Ref. Range 5/11/2020 11:44 8/13/2020 15:43 8/13/2020 15:44   Sodium Latest Ref Range: 133 - 144 mmol/L 139  140   Potassium Latest Ref Range: 3.4 - 5.3 mmol/L 4.1  4.8   Chloride Latest Ref Range: 94 - 109 mmol/L 111 (H)  110 (H)   Carbon Dioxide Latest Ref Range: 20 - 32 mmol/L 22  24   Urea Nitrogen Latest Ref Range: 7 - 30 mg/dL 52 (H)  44 (H)   Creatinine Latest Ref Range: 0.52 - 1.04 mg/dL 1.90 (H)  2.03 (H)   GFR Estimate Latest Ref Range: >60 mL/min/1.73_m2 25 (L)  23 (L)   GFR Estimate If Black Latest Ref Range: >60 mL/min/1.73_m2 29 (L)  27 (L)   Calcium Latest Ref Range: 8.5 - 10.1 mg/dL 9.0  8.7   Anion Gap Latest Ref Range: 3 - 14 mmol/L 6  6   Magnesium Latest Ref Range: 1.6 - 2.3 mg/dL   2.4 (H)   Albumin Latest Ref Range: 3.4 - 5.0 g/dL 3.2 (L)  3.1 (L)   Protein Total Latest Ref Range: 6.8 - 8.8 g/dL 7.5  7.5   Bilirubin Total Latest Ref Range: 0.2 - 1.3 mg/dL 1.1  1.3   Alkaline Phosphatase Latest Ref Range: 40 - 150 U/L 64  73   ALT Latest Ref Range: 0 - 50 U/L 21  19   AST Latest Ref Range: 0 - 45 U/L 16  12   Glucose Latest Ref Range: 70 - 99 mg/dL 119 (H)   109 (H)   WBC Latest Ref Range: 4.0 - 11.0 10e9/L 4.4  3.3 (L)   Hemoglobin Latest Ref Range: 11.7 - 15.7 g/dL 9.3 (L)  8.8 (L)   Hematocrit Latest Ref Range: 35.0 - 47.0 % 28.8 (L)  28.1 (L)   Platelet Count Latest Ref Range: 150 - 450 10e9/L 112 (L)  108 (L)   RBC Count Latest Ref Range: 3.8 - 5.2 10e12/L 2.69 (L)  2.86 (L)   MCV Latest Ref Range: 78 - 100 fl 107 (H)  98   MCH Latest Ref Range: 26.5 - 33.0 pg 34.6 (H)  30.8   MCHC Latest Ref Range: 31.5 - 36.5 g/dL 32.3  31.3 (L)   RDW Latest Ref Range: 10.0 - 15.0 % 16.2 (H)  18.0 (H)   Diff Method Unknown Automated Method  Automated Method   % Neutrophils Latest Units: % 51.2  35.8   % Lymphocytes Latest Units: % 33.7  40.2   % Monocytes Latest Units: % 11.7  18.9   % Eosinophils Latest Units: % 3.2  4.5   % Basophils Latest Units: % 0.2  0.3   % Immature Granulocytes Latest Units: % 0.0  0.3   Nucleated RBCs Latest Ref Range: 0 /100 1 (H)  2 (H)   Absolute Neutrophil Latest Ref Range: 1.6 - 8.3 10e9/L 2.2  1.2 (L)   Absolute Lymphocytes Latest Ref Range: 0.8 - 5.3 10e9/L 1.5  1.3   Absolute Monocytes Latest Ref Range: 0.0 - 1.3 10e9/L 0.5  0.6   Absolute Eosinophils Latest Ref Range: 0.0 - 0.7 10e9/L 0.1  0.2   Absolute Basophils Latest Ref Range: 0.0 - 0.2 10e9/L 0.0  0.0   Abs Immature Granulocytes Latest Ref Range: 0 - 0.4 10e9/L 0.0  0.0   Absolute Nucleated RBC Unknown 0.0  0.1   Platelet Estimate Unknown   Confirming automated cell count   Cyclosporine Last Dose Unknown LAST DOSE UNKNOWN 8/13 1030    Cyclosporine Level Latest Ref Range: 50 - 400 ug/L 161 179          Rafita Rogel MD

## 2020-09-10 ENCOUNTER — HOSPITAL ENCOUNTER (EMERGENCY)
Facility: CLINIC | Age: 75
Discharge: HOME | End: 2020-09-10
Attending: EMERGENCY MEDICINE | Admitting: EMERGENCY MEDICINE
Payer: COMMERCIAL

## 2020-09-10 ENCOUNTER — APPOINTMENT (OUTPATIENT)
Dept: CT IMAGING | Facility: CLINIC | Age: 75
End: 2020-09-10
Attending: EMERGENCY MEDICINE
Payer: COMMERCIAL

## 2020-09-10 ENCOUNTER — APPOINTMENT (OUTPATIENT)
Dept: MRI IMAGING | Facility: CLINIC | Age: 75
End: 2020-09-10
Attending: EMERGENCY MEDICINE
Payer: COMMERCIAL

## 2020-09-10 VITALS
TEMPERATURE: 97.8 F | RESPIRATION RATE: 18 BRPM | OXYGEN SATURATION: 99 % | HEART RATE: 84 BPM | SYSTOLIC BLOOD PRESSURE: 183 MMHG | DIASTOLIC BLOOD PRESSURE: 82 MMHG

## 2020-09-10 DIAGNOSIS — N18.9 CHRONIC RENAL FAILURE, UNSPECIFIED CKD STAGE: ICD-10-CM

## 2020-09-10 DIAGNOSIS — R20.2 PARESTHESIAS: ICD-10-CM

## 2020-09-10 DIAGNOSIS — R74.8 ELEVATED LIVER ENZYMES: ICD-10-CM

## 2020-09-10 LAB
ACANTHOCYTES BLD QL SMEAR: ABNORMAL
ALBUMIN SERPL-MCNC: 3.1 G/DL (ref 3.4–5)
ALP SERPL-CCNC: 79 U/L (ref 40–150)
ALT SERPL W P-5'-P-CCNC: 120 U/L (ref 0–50)
ANION GAP SERPL CALCULATED.3IONS-SCNC: 7 MMOL/L (ref 3–14)
ANISOCYTOSIS BLD QL SMEAR: ABNORMAL
APTT PPP: 32 SEC (ref 22–37)
AST SERPL W P-5'-P-CCNC: 93 U/L (ref 0–45)
BASOPHILS # BLD AUTO: 0 10E9/L (ref 0–0.2)
BASOPHILS NFR BLD AUTO: 0 %
BILIRUB SERPL-MCNC: 2.5 MG/DL (ref 0.2–1.3)
BUN SERPL-MCNC: 43 MG/DL (ref 7–30)
BURR CELLS BLD QL SMEAR: SLIGHT
CALCIUM SERPL-MCNC: 8.7 MG/DL (ref 8.5–10.1)
CHLORIDE SERPL-SCNC: 112 MMOL/L (ref 94–109)
CO2 SERPL-SCNC: 22 MMOL/L (ref 20–32)
CREAT SERPL-MCNC: 2.03 MG/DL (ref 0.52–1.04)
DIFFERENTIAL METHOD BLD: ABNORMAL
EOSINOPHIL # BLD AUTO: 0.1 10E9/L (ref 0–0.7)
EOSINOPHIL NFR BLD AUTO: 2 %
ERYTHROCYTE [DISTWIDTH] IN BLOOD BY AUTOMATED COUNT: 19.1 % (ref 10–15)
GFR SERPL CREATININE-BSD FRML MDRD: 23 ML/MIN/{1.73_M2}
GLUCOSE SERPL-MCNC: 99 MG/DL (ref 70–99)
HCT VFR BLD AUTO: 30.5 % (ref 35–47)
HGB BLD-MCNC: 9.9 G/DL (ref 11.7–15.7)
INR PPP: 1.38 (ref 0.86–1.14)
LYMPHOCYTES # BLD AUTO: 1.4 10E9/L (ref 0.8–5.3)
LYMPHOCYTES NFR BLD AUTO: 33 %
MCH RBC QN AUTO: 29.3 PG (ref 26.5–33)
MCHC RBC AUTO-ENTMCNC: 32.5 G/DL (ref 31.5–36.5)
MCV RBC AUTO: 90 FL (ref 78–100)
MONOCYTES # BLD AUTO: 0.4 10E9/L (ref 0–1.3)
MONOCYTES NFR BLD AUTO: 10 %
NEUTROPHILS # BLD AUTO: 2.3 10E9/L (ref 1.6–8.3)
NEUTROPHILS NFR BLD AUTO: 55 %
NRBC # BLD AUTO: 0.4 10*3/UL
NRBC BLD AUTO-RTO: 10 /100
OVALOCYTES BLD QL SMEAR: SLIGHT
PLATELET # BLD AUTO: 87 10E9/L (ref 150–450)
PLATELET # BLD EST: ABNORMAL 10*3/UL
POTASSIUM SERPL-SCNC: 3.9 MMOL/L (ref 3.4–5.3)
PROT SERPL-MCNC: 6.9 G/DL (ref 6.8–8.8)
RBC # BLD AUTO: 3.38 10E12/L (ref 3.8–5.2)
SODIUM SERPL-SCNC: 141 MMOL/L (ref 133–144)
TARGETS BLD QL SMEAR: SLIGHT
TROPONIN I SERPL-MCNC: <0.015 UG/L (ref 0–0.04)
WBC # BLD AUTO: 4.1 10E9/L (ref 4–11)

## 2020-09-10 PROCEDURE — 85730 THROMBOPLASTIN TIME PARTIAL: CPT | Performed by: EMERGENCY MEDICINE

## 2020-09-10 PROCEDURE — 70450 CT HEAD/BRAIN W/O DYE: CPT | Mod: XS

## 2020-09-10 PROCEDURE — 70553 MRI BRAIN STEM W/O & W/DYE: CPT

## 2020-09-10 PROCEDURE — 80053 COMPREHEN METABOLIC PANEL: CPT | Performed by: EMERGENCY MEDICINE

## 2020-09-10 PROCEDURE — 84484 ASSAY OF TROPONIN QUANT: CPT | Performed by: EMERGENCY MEDICINE

## 2020-09-10 PROCEDURE — 25000128 H RX IP 250 OP 636: Performed by: EMERGENCY MEDICINE

## 2020-09-10 PROCEDURE — 25500064 ZZH RX 255 OP 636: Performed by: EMERGENCY MEDICINE

## 2020-09-10 PROCEDURE — 25000125 ZZHC RX 250: Performed by: EMERGENCY MEDICINE

## 2020-09-10 PROCEDURE — 0042T CT HEAD PERFUSION WITH CONTRAST: CPT

## 2020-09-10 PROCEDURE — A9585 GADOBUTROL INJECTION: HCPCS | Performed by: EMERGENCY MEDICINE

## 2020-09-10 PROCEDURE — 70498 CT ANGIOGRAPHY NECK: CPT

## 2020-09-10 PROCEDURE — 85610 PROTHROMBIN TIME: CPT | Performed by: EMERGENCY MEDICINE

## 2020-09-10 PROCEDURE — 99285 EMERGENCY DEPT VISIT HI MDM: CPT | Mod: 25

## 2020-09-10 PROCEDURE — 85025 COMPLETE CBC W/AUTO DIFF WBC: CPT | Performed by: EMERGENCY MEDICINE

## 2020-09-10 PROCEDURE — 25800030 ZZH RX IP 258 OP 636: Performed by: EMERGENCY MEDICINE

## 2020-09-10 RX ORDER — GADOBUTROL 604.72 MG/ML
0.1 INJECTION INTRAVENOUS ONCE
Status: COMPLETED | OUTPATIENT
Start: 2020-09-10 | End: 2020-09-10

## 2020-09-10 RX ORDER — IOPAMIDOL 755 MG/ML
120 INJECTION, SOLUTION INTRAVASCULAR ONCE
Status: COMPLETED | OUTPATIENT
Start: 2020-09-10 | End: 2020-09-10

## 2020-09-10 RX ORDER — SODIUM CHLORIDE 9 MG/ML
INJECTION, SOLUTION INTRAVENOUS CONTINUOUS
Status: DISCONTINUED | OUTPATIENT
Start: 2020-09-10 | End: 2020-09-10 | Stop reason: HOSPADM

## 2020-09-10 RX ADMIN — GADOBUTROL 5.6 ML: 604.72 INJECTION INTRAVENOUS at 12:02

## 2020-09-10 RX ADMIN — SODIUM CHLORIDE 100 ML: 9 INJECTION, SOLUTION INTRAVENOUS at 09:31

## 2020-09-10 RX ADMIN — IOPAMIDOL 120 ML: 755 INJECTION, SOLUTION INTRAVENOUS at 09:31

## 2020-09-10 RX ADMIN — SODIUM CHLORIDE 500 ML: 9 INJECTION, SOLUTION INTRAVENOUS at 09:40

## 2020-09-10 ASSESSMENT — ENCOUNTER SYMPTOMS
SHORTNESS OF BREATH: 0
MYALGIAS: 0
HEADACHES: 0
COUGH: 0
FEVER: 0
WEAKNESS: 1
NUMBNESS: 1

## 2020-09-10 NOTE — ED PROVIDER NOTES
History     Chief Complaint:  Numbness    HPI   Bonny Raymundo is a right handed 74 year old female with a history of DVT and PE who presents with numbness. The patient reports between 8145-1025 last night she developed with left sided numbness and weakness. She states she had difficulty ambulating secondary to left leg weakness. She notes a history of DVT but states her present numbness is distinct from that because she today she has associated left jaw and left hand paresthesias. She denies cough, chest pain, shortness of breath, fever, and headache. Of note, she notes a few weeks ago her dosage of cyclosporine was decreased.  She denies a history of known stroke.    Allergies:  No known drug allergies      Medications:    Cyclosporine   Promacta   Protonix     Past Medical History:    Osteoporosis  Diplopia  Pseudophakia  Myopia of both eyes  PE  Thyroid nodule  Aplastic anemia   Pancytopenia  DVT, recurrent, lower extremity, acute  BPPV  Sensorineural hearing loss of both ears  Seasonal allergic rhinitis  Esophageal reflux    Past Surgical History:    Arthrodesis foot  Blepharoplasty bilateral   Bone marrow biopsy, bone specimen, needle/trocar x2    Hysterectomy   Myomectomy   Cataract IOL, RT/LT   Exchange intraocular lens implant  PICC insertion  Vitrectomy parsplana with 23 gauge system     Family History:    MS  Heart disease  Atrial fibrillation     Social History:  Smoking status- former smoker  Alcohol use- no  Drug use- no   Marital Status:       Review of Systems   Constitutional: Negative for fever.   Respiratory: Negative for cough and shortness of breath.    Cardiovascular: Negative for chest pain.   Musculoskeletal: Negative for myalgias.   Neurological: Positive for weakness and numbness. Negative for headaches.        Paresthesias   All other systems reviewed and are negative.      Physical Exam     Patient Vitals for the past 24 hrs:   BP Temp Temp src Pulse Resp SpO2    09/10/20 1041 (!) 183/82 97.8  F (36.6  C) Oral 84 18 99 %   09/10/20 0907 (!) 151/72 97.8  F (36.6  C) Oral 87 18 99 %     Physical Exam  Nursing note and vitals reviewed.    Constitutional:  Appears comfortable.    HENT:    Nose normal.  No discharge.      Oral mucosa is moist.  Eyes:    Conjunctivae are normal without injection.     Pupils are equal.  Lymph:  No enlarged or tender cervical or submandibular lymph nodes.   Cardiovascular:  Normal rate, regular rhythm with normal S1 and S2.      Normal heart sounds and peripheral pulses 2+ and equal.       No murmur or camilla.  Pulmonary:  Effort normal and breath sounds clear to auscultation bilaterally.     No respiratory distress.  No stridor.     No wheezes. No rales.     GI:    Soft. No distension and no mass. No tenderness.   Musculoskeletal:  Normal range of motion. No extremity deformity.     No edema and no tenderness.    Neurological:   GCS 15. NIH stroke scale score 1. O X 3.   Normal strength. Patient reports decreased sensation left side of face and left hand. Normal coordination. Normal finger to nose. Normal heel-knee-shin. No hand drift. No leg drift. Visual fields full. EOMs intact. No diplopia. No facial droop. No slurred speech. Mental status and memory normal. Comprehension and expression of speech is normal.    Skin:    Skin is warm and dry. No rash noted. No diaphoresis.      No erythema. No pallor.  No lesions.  Psychiatric:   Behavior is normal. Appropriate mood and affect.     Judgment and thought content normal.      Emergency Department Course     Imaging:  Radiology findings were communicated with the patient who voiced understanding of the findings.    CT Head wo contrast   Diffuse cerebral volume loss and cerebral white matter  changes consistent with chronic small vessel ischemic disease. No  evidence for acute intracranial pathology.   Reading per radiology     CTA Head Neck w contrast   Normal neck and head CTA.  Reading per  radiology     CT Head Perfusion w contrast   Normal CT perfusion of the brain.  Reading per radiology     MR Brain w & w/o contrast   Diffuse cerebral volume loss and cerebral white matter  changes consistent with chronic small vessel ischemic disease. No  evidence for acute intracranial pathology.  Reading per radiology     Laboratory:  Laboratory findings were communicated with the patient who voiced understanding of the findings.    CBC: RBC Count 3.38 (L), HGB 9.9 (L), Hematocrit 30.5 (L), RDW 19.1 (H), PLT 87 (L), Nucleated RBCs 10 (H), o/w WNL (WBC 4.1)  CMP: Chloride 112 (H), Urea Nitrogen 43 (H), GFR Estimate 23 (L), Bilirubin total 2.5 (H), Albumin 3.1 (L),  (H), AST 93 (H), o/w WNL (Creatinine 2.03 (H))     Troponin (Collected 0918): <0.015    INR: 1.38 (H)    Partial thromboplastin time: 32    Interventions:  0940 NS 1L IV Bolus     1202 Gadavist 5.6 mL IV    Emergency Department Course:  Past medical records, nursing notes, and vitals reviewed.    0910 I performed an exam of the patient as documented above.     0918 I spoke to [facility] of stroke neurology.     0920 Code stroke called.     0925 The patient was sent for a CT head and CTA head and neck while in the emergency department, results above.     0947 IV was inserted and blood was drawn for laboratory testing, results above.    0952 I spoke with [facility] of stroke neurology.    0956 I rechecked the patient and discussed the results of their workup thus far.     1221 Patient passed the road test and is safe for discharge.    Findings and plan explained to the Patient. Patient discharged home with instructions regarding supportive care, medications, and reasons to return. The importance of close follow-up was reviewed.    Impression & Plan     Covid-19  Bonny Raymundo was evaluated during a global COVID-19 pandemic, which necessitated consideration that the patient might be at risk for infection with the SARS-CoV-2 virus that causes  COVID-19.   Applicable protocols for evaluation were followed during the patient's care.   COVID-19 was considered as part of the patient's evaluation. The plan for testing is:  a test was obtained during this visit.     CMS Diagnoses: The patient has stroke symptoms:         ED Stroke specific documentation           NIHSS PDF     Patient last known well time: 2200 last night  ED Provider first to bedside at: 0908  CT Results received at: 0950    tPA:   Not given due to greater than 4 hours since onset.    If treating with tPA: Ensure SBP<185 and DBP<105 prior to treatment with IV tPA.  Administering IV tPA after treatment with IV labetalol, hydralazine, or nicardipine is reasonable once BP control is established.    Endovascular Retrieval:  Not initiated due to absence of proximal vessel occlusion    National Institutes of Health Stroke Scale (Baseline)  Time Performed: 0915     Score    Level of consciousness: (0)   Alert, keenly responsive    LOC questions: (0)   Answers both questions correctly    LOC commands: (0)   Performs both tasks correctly    Best gaze: (0)   Normal    Visual: (0)   No visual loss    Facial palsy: (0)   Normal symmetrical movements    Motor arm (left): (0)   No drift    Motor arm (right): (0)   No drift    Motor leg (left): (0)   No drift    Motor leg (right): (0)   No drift    Limb ataxia: (0)   Absent    Sensory: (1)   Mild to moderate sensory loss    Best language: (0)   Normal- no aphasia    Dysarthria: (0)   Normal    Extinction and inattention: (0)   No abnormality        Total Score:  1        Stroke Mimics were considered (including migraine headache, seizure disorder, hypoglycemia (or hyperglycemia), head or spinal trauma, CNS infection, Toxin ingestion and shock state (e.g. sepsis) .      Medical Decision Making:  Patient comes in with some subjective numbness on the left side of her face and left arm.  I do not find any tingling in her left leg.  She describes weakness but I  could not appreciated on exam in the left leg.  Her NIH score was 1.  I talked with stroke neurology and I did call a code stroke initially although she was not a TPA candidate and she was de-escalated.  CT and CTA revealed evidence of an old left basal ganglia stroke but otherwise no acute bleed or lesion in the blood vessels.  Perfusion scan is normal as well.  I talked with stroke neurology again and they recommended an MRI and this is ordered here in the emergency room.  The MRI was obtained and did not show any acute stroke.  The patient just had tingling in her left hand at this time.  She got up and ambulated to the bathroom and back without any problem.  I do not feel she needs admission at this time.  This possibly could have been a TIA but the symptoms were spread out and hard to measure on clinical exam.  She did have evidence of a small stroke on the CT in the past.  I am comfortable discharging her home but she should follow-up in the clinic with her doctor.  Her LFTs were up slightly with her blood work as they had been in the past.  They were not sure if it was from the Tylenol or her ciclosporine.  She will be following up with her doctor regarding this.  She has no abdominal pain that I feel would necessitate the need for x-rays or ultrasound.  It is possible she could have some peripheral nerve issue that is causing the tingling in her hand.  It did not seem to be in her leg today and I am not sure what this subjective complaint of weakness in the leg was but I did not appreciate it here and she was ambulating without difficulty.  She does have renal failure as well and this can be followed up in the clinic.  If she gets worse and I discussed with her symptoms of stroke, she should return to the ER.    Stay hydrated today, reduce your Tylenol to 4 tablets a day.  See your provider in the clinic either tomorrow or Monday for a recheck of the numbness in your left hand.  You might need to see a  neurologist or have some other studies done such as an EMG of your arm if it persists.  Keep your blood draw appointment next week and you should have your kidney function and your liver function tests rechecked at that appointment.  Notify your hematologist about your elevated liver tests and see what they want you to do about your cyclosporine dose.  If you develop stroke symptoms at any time such as a facial droop, inability to speak, profound weakness and in arm or leg, call 911.    Diagnosis:    ICD-10-CM    1. Paresthesias  R20.2    2. Chronic renal failure, unspecified CKD stage  N18.9    3. Elevated liver enzymes  R74.8      Disposition:  Discharged to home.    Discharge Medications:  Discharge Medication List as of 9/10/2020 12:43 PM        Scribe Disclosure:  I, Lela Jerry, am serving as a scribe at 9:08 AM on 9/10/2020 to document services personally performed by Ina Green MD based on my observations and the provider's statements to me.        Ina Green MD  09/10/20 3509

## 2020-09-10 NOTE — DISCHARGE INSTRUCTIONS
Stay hydrated today, reduce your Tylenol to 4 tablets a day.  See your provider in the clinic either tomorrow or Monday for a recheck of the numbness in your left hand.  You might need to see a neurologist or have some other studies done such as an EMG of your arm if it persists.  Keep your blood draw appointment next week and you should have your kidney function and your liver function tests rechecked at that appointment.  Notify your hematologist about your elevated liver tests and see what they want you to do about your cyclosporine dose.  If you develop stroke symptoms at any time such as a facial droop, inability to speak, profound weakness and in arm or leg, call 911.

## 2020-09-10 NOTE — ED AVS SNAPSHOT
Emergency Department  [address] [facility] [address]  Phone:  [phone]  Fax:  [phone]                                    Bonny Raymundo   MRN: [id number]    Department:   Emergency Department   Date of Visit:  9/10/2020           After Visit Summary Signature Page    I have received my discharge instructions, and my questions have been answered. I have discussed any challenges I see with this plan with the nurse or doctor.    ..........................................................................................................................................  Patient/Patient Representative Signature      ..........................................................................................................................................  Patient Representative Print Name and Relationship to Patient    ..................................................               ................................................  Date                                   Time    ..........................................................................................................................................  Reviewed by Signature/Title    ...................................................              ..............................................  Date                                               Time          22EPIC Rev 08/18

## 2020-09-10 NOTE — PROGRESS NOTES
Stable and cleared for discharge by provider. AVS printed and reviewed with patient. Patient verbalizes understanding of all instructions. Discharged at 1245. All personal belongings sent with patient.

## 2020-09-16 ENCOUNTER — OFFICE VISIT (OUTPATIENT)
Dept: FAMILY MEDICINE | Facility: CLINIC | Age: 75
End: 2020-09-16
Payer: COMMERCIAL

## 2020-09-16 VITALS
DIASTOLIC BLOOD PRESSURE: 82 MMHG | OXYGEN SATURATION: 97 % | WEIGHT: 126 LBS | BODY MASS INDEX: 24.74 KG/M2 | HEIGHT: 60 IN | TEMPERATURE: 97.5 F | HEART RATE: 78 BPM | SYSTOLIC BLOOD PRESSURE: 126 MMHG

## 2020-09-16 DIAGNOSIS — R20.0 LEFT SIDED NUMBNESS: Primary | ICD-10-CM

## 2020-09-16 DIAGNOSIS — D61.9 APLASTIC ANEMIA (H): ICD-10-CM

## 2020-09-16 DIAGNOSIS — Z23 NEED FOR PROPHYLACTIC VACCINATION AND INOCULATION AGAINST INFLUENZA: ICD-10-CM

## 2020-09-16 PROCEDURE — 90662 IIV NO PRSV INCREASED AG IM: CPT | Performed by: PHYSICIAN ASSISTANT

## 2020-09-16 PROCEDURE — 99214 OFFICE O/P EST MOD 30 MIN: CPT | Mod: 25 | Performed by: PHYSICIAN ASSISTANT

## 2020-09-16 PROCEDURE — G0008 ADMIN INFLUENZA VIRUS VAC: HCPCS | Performed by: PHYSICIAN ASSISTANT

## 2020-09-16 ASSESSMENT — MIFFLIN-ST. JEOR: SCORE: 983.03

## 2020-09-16 NOTE — PROGRESS NOTES
Subjective     Bonny Raymundo is a 74 year old female who presents to clinic today for the following health issues:    Discharge summary from On license of [facility]:     Medical Decision Making:  Patient comes in with some subjective numbness on the left side of her face and left arm.  I do not find any tingling in her left leg.  She describes weakness but I could not appreciated on exam in the left leg.  Her NIH score was 1.  I talked with stroke neurology and I did call a code stroke initially although she was not a TPA candidate and she was de-escalated.  CT and CTA revealed evidence of an old left basal ganglia stroke but otherwise no acute bleed or lesion in the blood vessels.  Perfusion scan is normal as well.  I talked with stroke neurology again and they recommended an MRI and this is ordered here in the emergency room.  The MRI was obtained and did not show any acute stroke.  The patient just had tingling in her left hand at this time.  She got up and ambulated to the bathroom and back without any problem.  I do not feel she needs admission at this time.  This possibly could have been a TIA but the symptoms were spread out and hard to measure on clinical exam.  She did have evidence of a small stroke on the CT in the past.  I am comfortable discharging her home but she should follow-up in the clinic with her doctor.  Her LFTs were up slightly with her blood work as they had been in the past.  They were not sure if it was from the Tylenol or her ciclosporine.  She will be following up with her doctor regarding this.  She has no abdominal pain that I feel would necessitate the need for x-rays or ultrasound.  It is possible she could have some peripheral nerve issue that is causing the tingling in her hand.  It did not seem to be in her leg today and I am not sure what this subjective complaint of weakness in the leg was but I did not appreciate it here and she was ambulating without difficulty.  She does have renal failure as  well and this can be followed up in the clinic.  If she gets worse and I discussed with her symptoms of stroke, she should return to the ER.     Stay hydrated today, reduce your Tylenol to 4 tablets a day.  See your provider in the clinic either tomorrow or Monday for a recheck of the numbness in your left hand.  You might need to see a neurologist or have some other studies done such as an EMG of your arm if it persists.  Keep your blood draw appointment next week and you should have your kidney function and your liver function tests rechecked at that appointment.  Notify your hematologist about your elevated liver tests and see what they want you to do about your cyclosporine dose.  If you develop stroke symptoms at any time such as a facial droop, inability to speak, profound weakness and in arm or leg, call 911.    HPI     Pt here with L sided numbness and tingling in the L side of the jaw, L leg and L arm   She is getting better and the arm feels normal  Denies any facial droop  She had a crown and filling last week on the left side at the dentist  She feels some tingling in L foot with her shoes on    She was taking tylenol throughout the day  LFTs elevated so she has tried to reduce the dose  Cyclosporine dose was decreased due to CKD  She is scheduled for labs tomorrow.      ED/UC Followup:    Facility:   ED  Date of visit: 09/10/2020  Reason for visit:     Paresthesias   Chronic renal failure, unspecified CKD stage   Elevated liver enzymes     Current Status: improved     Past Medical History:   Diagnosis Date     Allergic rhinitis due to other allergen      Aplastic anemia (H) 1/9/2017     Closed anterior dislocation of humerus      DVT of lower extremity (deep venous thrombosis) (H) 10-12    Left after prolonged sitting-plane     DVT, recurrent, lower extremity, acute (H) 2014     Headache(784.0)      Osteoporosis, unspecified      Pulmonary emboli (H) 10-12     Sensorineural hearing loss, unspecified       Sprain of ankle, unspecified site     L     Past Surgical History:   Procedure Laterality Date     ARTHRODESIS FOOT  11    Hallux valgus R-1st MP joint     BLEPHAROPLASTY BILATERAL  ,      BONE MARROW BIOPSY, BONE SPECIMEN, NEEDLE/TROCAR N/A 2016    Procedure: BIOPSY BONE MARROW;  Surgeon: Mu Morgan MD;  Location:  GI     BONE MARROW BIOPSY, BONE SPECIMEN, NEEDLE/TROCAR N/A 2016    Procedure: BIOPSY BONE MARROW;  Surgeon: Mu Morgan MD;  Location:  GI     C DEXA INTERPRETATION, AXIAL  03     C NONSPECIFIC PROCEDURE           C NONSPECIFIC PROCEDURE      hysterectomy/BSO     C NONSPECIFIC PROCEDURE      myomectomy (fibroids)     CATARACT IOL, RT/LT Right      CATARACT IOL, RT/LT Left      COLONOSCOPY N/A 2018    Procedure: COLONOSCOPY;  colonoscopy;  Surgeon: Meliton Castrejon MD;  Location:  GI     EXCHANGE INTRAOCULAR LENS IMPLANT Right 2015    Procedure: EXCHANGE INTRAOCULAR LENS IMPLANT;  Surgeon: Garertt Dawson MD;  Location: [facility]     HC COLONOSCOPY THRU STOMA, DIAGNOSTIC      normal- minimal diverticulosis     PICC INSERTION Left 2017    5fr DL BioFlo PICC, 42cm (2cm external) in the L basilic vein w/ tip in the  SVC RA junction.     VITRECTOMY PARSPLANA WITH 23 GAUGE SYSTEM Right 2015    Procedure: VITRECTOMY PARSPLANA WITH 23 GAUGE SYSTEM;  Surgeon: Racheal Loyd MD;  Location: [facility]     Social History     Tobacco Use     Smoking status: Former Smoker     Last attempt to quit: 1973     Years since quittin.3     Smokeless tobacco: Never Used   Substance Use Topics     Alcohol use: No     Alcohol/week: 0.0 standard drinks     Comment: occasionally     Current Outpatient Medications   Medication Sig Dispense Refill     acetaminophen (TYLENOL) 325 MG tablet Take 1 tablet (325 mg) by mouth every 6 hours as needed for mild pain or fever maximum 2000 mg (2 grams) per day        Calcium Carb-Cholecalciferol (CALCIUM + D3) 600-200 MG-UNIT TABS Take 1 tablet by mouth 2 times daily 60 tablet      COMPRESSION STOCKINGS Wear compression stockings at 20-30 mmHg rating most time during the day to the affected leg (left leg) or both legs. Take them off at night. 2 each 2     cycloSPORINE modified (GENERIC EQUIVALENT) 25 MG capsule Take 1 capsule (25 mg) by mouth 2 times daily or as directed 540 capsule 2     diphenhydrAMINE (BENADRYL ALLERGY) 25 MG tablet Take 25 mg by mouth At Bedtime  56 tablet      eltrombopag (PROMACTA) 50 MG tablet Take 3 tablets (150 mg) by mouth daily Administer on an empty stomach, 1 hour before or 2 hours after a meal. Or as directed 90 tablet 6     ORDER FOR DME Equipment being ordered: knee high compression stockings- 18-20 mm 1 Box 1     pantoprazole (PROTONIX) 20 MG EC tablet Take 1 tablet (20 mg) by mouth daily 90 tablet 11     Allergies   Allergen Reactions     Cats      Dogs      Seasonal Allergies      FAMILY HISTORY NOTED AND REVIEWED    PHYSICAL EXAM:    /82 (BP Location: Right arm, Patient Position: Sitting, Cuff Size: Adult Regular)   Pulse 78   Temp 97.5  F (36.4  C) (Temporal)   Ht 1.524 m (5')   Wt 57.2 kg (126 lb)   SpO2 97%   BMI 24.61 kg/m      Patient appears non toxic  Neuro: normal speech and gait  Normal facial symmetry  No rash   strength symmetric    Assessment and Plan:     (R20.0) Left sided numbness  (primary encounter diagnosis)  Comment: may need EMG for further eval  Plan: NEUROLOGY ADULT REFERRAL            (D61.9) Aplastic anemia (H)  Comment:   Plan: managed by Dr. Rogel and recent med dose changes reviewed    (Z23) Need for prophylactic vaccination and inoculation against influenza  Comment:   Plan: FLUZONE HIGH DOSE 65+  [93960], Vaccine         Administration, Initial [30382], ADMIN         INFLUENZA (For MEDICARE Patients ONLY) []              Shivani Phelps PA-C

## 2020-09-17 ENCOUNTER — HOSPITAL ENCOUNTER (OUTPATIENT)
Facility: CLINIC | Age: 75
Setting detail: SPECIMEN
Discharge: HOME | End: 2020-09-17
Attending: INTERNAL MEDICINE | Admitting: INTERNAL MEDICINE
Payer: COMMERCIAL

## 2020-09-17 ENCOUNTER — INFUSION THERAPY VISIT (OUTPATIENT)
Dept: INFUSION THERAPY | Facility: CLINIC | Age: 75
End: 2020-09-17
Attending: INTERNAL MEDICINE
Payer: COMMERCIAL

## 2020-09-17 DIAGNOSIS — D61.818 PANCYTOPENIA (H): ICD-10-CM

## 2020-09-17 DIAGNOSIS — D61.3 IDIOPATHIC APLASTIC ANEMIA (H): ICD-10-CM

## 2020-09-17 LAB
ACANTHOCYTES BLD QL SMEAR: SLIGHT
ALBUMIN SERPL-MCNC: 3 G/DL (ref 3.4–5)
ALP SERPL-CCNC: 92 U/L (ref 40–150)
ALT SERPL W P-5'-P-CCNC: 210 U/L (ref 0–50)
ANION GAP SERPL CALCULATED.3IONS-SCNC: 5 MMOL/L (ref 3–14)
ANISOCYTOSIS BLD QL SMEAR: ABNORMAL
AST SERPL W P-5'-P-CCNC: 126 U/L (ref 0–45)
BASOPHILS # BLD AUTO: 0 10E9/L (ref 0–0.2)
BASOPHILS NFR BLD AUTO: 0 %
BILIRUB SERPL-MCNC: 2.7 MG/DL (ref 0.2–1.3)
BUN SERPL-MCNC: 35 MG/DL (ref 7–30)
CALCIUM SERPL-MCNC: 8.2 MG/DL (ref 8.5–10.1)
CHLORIDE SERPL-SCNC: 105 MMOL/L (ref 94–109)
CO2 SERPL-SCNC: 26 MMOL/L (ref 20–32)
CREAT SERPL-MCNC: 1.81 MG/DL (ref 0.52–1.04)
DIFFERENTIAL METHOD BLD: ABNORMAL
EOSINOPHIL # BLD AUTO: 0.4 10E9/L (ref 0–0.7)
EOSINOPHIL NFR BLD AUTO: 11 %
ERYTHROCYTE [DISTWIDTH] IN BLOOD BY AUTOMATED COUNT: 20.8 % (ref 10–15)
GFR SERPL CREATININE-BSD FRML MDRD: 27 ML/MIN/{1.73_M2}
GLUCOSE SERPL-MCNC: 109 MG/DL (ref 70–99)
HCT VFR BLD AUTO: 29.1 % (ref 35–47)
HGB BLD-MCNC: 9.4 G/DL (ref 11.7–15.7)
LYMPHOCYTES # BLD AUTO: 0.9 10E9/L (ref 0.8–5.3)
LYMPHOCYTES NFR BLD AUTO: 26 %
MCH RBC QN AUTO: 28.9 PG (ref 26.5–33)
MCHC RBC AUTO-ENTMCNC: 32.3 G/DL (ref 31.5–36.5)
MCV RBC AUTO: 90 FL (ref 78–100)
MONOCYTES # BLD AUTO: 0.7 10E9/L (ref 0–1.3)
MONOCYTES NFR BLD AUTO: 21 %
NEUTROPHILS # BLD AUTO: 1.4 10E9/L (ref 1.6–8.3)
NEUTROPHILS NFR BLD AUTO: 42 %
NRBC # BLD AUTO: 0.5 10*3/UL
NRBC BLD AUTO-RTO: 14 /100
PLATELET # BLD AUTO: 75 10E9/L (ref 150–450)
PLATELET # BLD EST: ABNORMAL 10*3/UL
POTASSIUM SERPL-SCNC: 3.9 MMOL/L (ref 3.4–5.3)
PROT SERPL-MCNC: 7.2 G/DL (ref 6.8–8.8)
RBC # BLD AUTO: 3.25 10E12/L (ref 3.8–5.2)
RBC INCLUSIONS BLD: SLIGHT
SODIUM SERPL-SCNC: 136 MMOL/L (ref 133–144)
TARGETS BLD QL SMEAR: SLIGHT
WBC # BLD AUTO: 3.3 10E9/L (ref 4–11)

## 2020-09-17 PROCEDURE — 80053 COMPREHEN METABOLIC PANEL: CPT | Performed by: INTERNAL MEDICINE

## 2020-09-17 PROCEDURE — 36415 COLL VENOUS BLD VENIPUNCTURE: CPT

## 2020-09-17 PROCEDURE — 85025 COMPLETE CBC W/AUTO DIFF WBC: CPT | Performed by: INTERNAL MEDICINE

## 2020-09-17 NOTE — PROGRESS NOTES
Medical Assistant Note:  Bonny Raymundo presents today for lab draw.    Patient seen by provider today: No.   present during visit today: Not Applicable.    Concerns: No Concerns.    Procedure:  Lab draw site: LAC, Needle type: BF, Gauge: 21. Gauze and coban applied    Post Assessment:  Labs drawn without difficulty: Yes.    Discharge Plan:  Departure Mode: Ambulatory.    Face to Face Time: 5.    Rmaya Zabala CMA

## 2020-10-14 ENCOUNTER — TELEPHONE (OUTPATIENT)
Dept: ONCOLOGY | Facility: CLINIC | Age: 75
End: 2020-10-14

## 2020-10-14 DIAGNOSIS — M80.88XD OSTEOPOROTIC COMPRESSION FRACTURE OF SPINE, WITH ROUTINE HEALING, SUBSEQUENT ENCOUNTER: ICD-10-CM

## 2020-10-14 DIAGNOSIS — Z92.29 PERSONAL HISTORY OF OTHER DRUG THERAPY: ICD-10-CM

## 2020-10-15 ENCOUNTER — INFUSION THERAPY VISIT (OUTPATIENT)
Dept: INFUSION THERAPY | Facility: CLINIC | Age: 75
End: 2020-10-15
Attending: PHYSICIAN ASSISTANT
Payer: COMMERCIAL

## 2020-10-15 ENCOUNTER — HOSPITAL ENCOUNTER (OUTPATIENT)
Facility: CLINIC | Age: 75
Setting detail: SPECIMEN
Discharge: HOME | End: 2020-10-15
Attending: INTERNAL MEDICINE | Admitting: INTERNAL MEDICINE
Payer: COMMERCIAL

## 2020-10-15 DIAGNOSIS — D61.3 IDIOPATHIC APLASTIC ANEMIA (H): ICD-10-CM

## 2020-10-15 PROBLEM — Z92.29 PERSONAL HISTORY OF OTHER DRUG THERAPY: Status: ACTIVE | Noted: 2020-10-15

## 2020-10-15 LAB
ACANTHOCYTES BLD QL SMEAR: ABNORMAL
ALBUMIN SERPL-MCNC: 2.9 G/DL (ref 3.4–5)
ALP SERPL-CCNC: 83 U/L (ref 40–150)
ALT SERPL W P-5'-P-CCNC: 63 U/L (ref 0–50)
ANION GAP SERPL CALCULATED.3IONS-SCNC: 4 MMOL/L (ref 3–14)
ANISOCYTOSIS BLD QL SMEAR: ABNORMAL
AST SERPL W P-5'-P-CCNC: 26 U/L (ref 0–45)
BASOPHILS # BLD AUTO: 0 10E9/L (ref 0–0.2)
BASOPHILS NFR BLD AUTO: 0 %
BILIRUB SERPL-MCNC: 2.6 MG/DL (ref 0.2–1.3)
BUN SERPL-MCNC: 30 MG/DL (ref 7–30)
CALCIUM SERPL-MCNC: 8.8 MG/DL (ref 8.5–10.1)
CHLORIDE SERPL-SCNC: 113 MMOL/L (ref 94–109)
CO2 SERPL-SCNC: 26 MMOL/L (ref 20–32)
CREAT SERPL-MCNC: 1.73 MG/DL (ref 0.52–1.04)
DIFFERENTIAL METHOD BLD: ABNORMAL
EOSINOPHIL # BLD AUTO: 0 10E9/L (ref 0–0.7)
EOSINOPHIL NFR BLD AUTO: 2 %
ERYTHROCYTE [DISTWIDTH] IN BLOOD BY AUTOMATED COUNT: 27 % (ref 10–15)
GFR SERPL CREATININE-BSD FRML MDRD: 28 ML/MIN/{1.73_M2}
GLUCOSE SERPL-MCNC: 126 MG/DL (ref 70–99)
HCT VFR BLD AUTO: 28.5 % (ref 35–47)
HGB BLD-MCNC: 8.7 G/DL (ref 11.7–15.7)
LYMPHOCYTES # BLD AUTO: 0.8 10E9/L (ref 0.8–5.3)
LYMPHOCYTES NFR BLD AUTO: 34 %
MCH RBC QN AUTO: 29.2 PG (ref 26.5–33)
MCHC RBC AUTO-ENTMCNC: 30.5 G/DL (ref 31.5–36.5)
MCV RBC AUTO: 96 FL (ref 78–100)
MONOCYTES # BLD AUTO: 0.4 10E9/L (ref 0–1.3)
MONOCYTES NFR BLD AUTO: 15 %
NEUTROPHILS # BLD AUTO: 1.2 10E9/L (ref 1.6–8.3)
NEUTROPHILS NFR BLD AUTO: 49 %
NRBC # BLD AUTO: 0.8 10*3/UL
NRBC BLD AUTO-RTO: 33 /100
PLATELET # BLD AUTO: 91 10E9/L (ref 150–450)
PLATELET # BLD EST: ABNORMAL 10*3/UL
POTASSIUM SERPL-SCNC: 4.4 MMOL/L (ref 3.4–5.3)
PROT SERPL-MCNC: 7.4 G/DL (ref 6.8–8.8)
RBC # BLD AUTO: 2.98 10E12/L (ref 3.8–5.2)
RBC INCLUSIONS BLD: SLIGHT
SODIUM SERPL-SCNC: 143 MMOL/L (ref 133–144)
TARGETS BLD QL SMEAR: SLIGHT
WBC # BLD AUTO: 2.4 10E9/L (ref 4–11)

## 2020-10-15 PROCEDURE — 36415 COLL VENOUS BLD VENIPUNCTURE: CPT

## 2020-10-15 PROCEDURE — 85025 COMPLETE CBC W/AUTO DIFF WBC: CPT | Performed by: INTERNAL MEDICINE

## 2020-10-15 PROCEDURE — 80053 COMPREHEN METABOLIC PANEL: CPT | Performed by: INTERNAL MEDICINE

## 2020-10-15 NOTE — PROGRESS NOTES
Medical Assistant Note:  Bonny Raymundo presents today for lab draw.    Patient seen by provider today: No.   present during visit today: Not Applicable.    Concerns: No Concerns.    Procedure:  Lab draw site: LAC, Needle type: BF, Gauge: 21. Gauze and coban applied    Post Assessment:  Labs drawn without difficulty: Yes.    Discharge Plan:  Departure Mode: Ambulatory.    Face to Face Time: 5.    Ramya Zabala CMA

## 2020-10-16 ENCOUNTER — ALLIED HEALTH/NURSE VISIT (OUTPATIENT)
Dept: INFUSION THERAPY | Facility: CLINIC | Age: 75
End: 2020-10-16
Attending: INTERNAL MEDICINE
Payer: COMMERCIAL

## 2020-10-16 ENCOUNTER — VIRTUAL VISIT (OUTPATIENT)
Dept: ONCOLOGY | Facility: CLINIC | Age: 75
End: 2020-10-16
Attending: INTERNAL MEDICINE
Payer: COMMERCIAL

## 2020-10-16 ENCOUNTER — DOCUMENTATION ONLY (OUTPATIENT)
Dept: PHARMACY | Facility: CLINIC | Age: 75
End: 2020-10-16

## 2020-10-16 VITALS
RESPIRATION RATE: 22 BRPM | DIASTOLIC BLOOD PRESSURE: 72 MMHG | OXYGEN SATURATION: 100 % | SYSTOLIC BLOOD PRESSURE: 112 MMHG | TEMPERATURE: 98.1 F | HEART RATE: 106 BPM

## 2020-10-16 VITALS
HEIGHT: 60 IN | DIASTOLIC BLOOD PRESSURE: 72 MMHG | BODY MASS INDEX: 24.61 KG/M2 | SYSTOLIC BLOOD PRESSURE: 112 MMHG | RESPIRATION RATE: 22 BRPM | HEART RATE: 106 BPM | TEMPERATURE: 98.1 F | OXYGEN SATURATION: 100 %

## 2020-10-16 DIAGNOSIS — D61.3 IDIOPATHIC APLASTIC ANEMIA (H): ICD-10-CM

## 2020-10-16 DIAGNOSIS — M80.88XD OSTEOPOROTIC COMPRESSION FRACTURE OF SPINE, WITH ROUTINE HEALING, SUBSEQUENT ENCOUNTER: ICD-10-CM

## 2020-10-16 DIAGNOSIS — Z92.29 PERSONAL HISTORY OF OTHER DRUG THERAPY: ICD-10-CM

## 2020-10-16 DIAGNOSIS — D61.818 PANCYTOPENIA (H): ICD-10-CM

## 2020-10-16 DIAGNOSIS — M80.00XD OSTEOPOROSIS WITH CURRENT PATHOLOGICAL FRACTURE WITH ROUTINE HEALING, UNSPECIFIED OSTEOPOROSIS TYPE, SUBSEQUENT ENCOUNTER: Primary | ICD-10-CM

## 2020-10-16 PROCEDURE — 250N000011 HC RX IP 250 OP 636: Performed by: PHYSICIAN ASSISTANT

## 2020-10-16 PROCEDURE — 96372 THER/PROPH/DIAG INJ SC/IM: CPT | Performed by: PHYSICIAN ASSISTANT

## 2020-10-16 PROCEDURE — 999N001193 HC VIDEO/TELEPHONE VISIT; NO CHARGE

## 2020-10-16 PROCEDURE — 99443 PR PHYSICIAN TELEPHONE EVALUATION 21-30 MIN: CPT | Mod: 95 | Performed by: PHYSICIAN ASSISTANT

## 2020-10-16 RX ADMIN — DENOSUMAB 60 MG: 60 INJECTION SUBCUTANEOUS at 11:04

## 2020-10-16 ASSESSMENT — PAIN SCALES - GENERAL
PAINLEVEL: NO PAIN (0)
PAINLEVEL: NO PAIN (0)

## 2020-10-16 NOTE — PROGRESS NOTES
Infusion Nursing Note:  Bonny Raymundo presents today for prolia.    Patient seen by provider today: No   present during visit today: Not Applicable.    Note: Advised to take Ca+ orally if not already taking. Also, told pt that we will be calling her dr regarding her elevated Creatinine.    Intravenous Access:  No Intravenous access/labs at this visit.    Treatment Conditions:  Lab Results   Component Value Date    HGB 8.7 10/15/2020     Lab Results   Component Value Date    WBC 2.4 10/15/2020      Lab Results   Component Value Date    ANEU 1.2 10/15/2020     Lab Results   Component Value Date    PLT 91 10/15/2020      Results reviewed, labs MET treatment parameters, ok to proceed with treatment.      Post Infusion Assessment:  Patient tolerated injection without incident.  Site patent and intact, free from redness, edema or discomfort.       Discharge Plan:   Patient and/or family verbalized understanding of discharge instructions and all questions answered.  Patient discharged in stable condition accompanied by: self.  Departure Mode: Ambulatory.    Laisha Jeffery RN

## 2020-10-16 NOTE — PROGRESS NOTES
"Bonny Raymundo is a 74 year old female who is being evaluated via a billable telephone visit.      The patient has been notified of following:     \"This telephone visit will be conducted via a call between you and your physician/provider. We have found that certain health care needs can be provided without the need for a physical exam.  This service lets us provide the care you need with a short phone conversation.  If a prescription is necessary we can send it directly to your pharmacy.  If lab work is needed we can place an order for that and you can then stop by our lab to have the test done at a later time.    Telephone visits are billed at different rates depending on your insurance coverage. During this emergency period, for some insurers they may be billed the same as an in-person visit.  Please reach out to your insurance provider with any questions.    If during the course of the call the physician/provider feels a telephone visit is not appropriate, you will not be charged for this service.\"    Patient has given verbal consent for Telephone visit?  Yes    What phone number would you like to be contacted at? [phone]    How would you like to obtain your AVS? Mail a copy     /72   Pulse 106   Temp 98.1  F (36.7  C) (Oral)   Resp 22   Ht 1.524 m (5')   SpO2 100%   BMI 24.61 kg/m      Roque Erwin LPN    Phone call duration: 27 minutes    Celine Walls PA-C      Oct 16, 2020        Reason for Visit: f/u for aplastic anemia    Oncology HPI:   Bonny is a 73 year old female who presented in the fall of 2016 with fatigue and shortness of breath. 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  Her counts showed pancytopenia and BMBX was concerning for aplastic anemia.  By December of 2016 she was transfusion dependent with platelets under 10 k and ANC dropped under 500. 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  She has met " with Dr Mcdaniel at [facility] and consulted with Dr Rogel at [facility].       After further consultation it was decided to admit on 1/9/17 for ATG/cyclosporine/prednisone therapy.      The following was her inpatient regimen:  Day 1= 1/9/17  ATG 2500 mg (40 mg/kg0 q24 hours D-4)  Cyclosporine 200 mg (3 mg/kg) PO bid  Eltrombopag 50 mg daily  Methylpred 31 mg (0.5 mg/kg) q24 hours D1-4  Prednisone 60 mg (1 mg/kg) daily after completion of IV methylpred to continue for at least 2 weeks    Interval history: Bonny is doing well today.  She had her Prolia this AM.  She notes we haven't talked in almost a year.  A couple months she was in the ED for numbness on the entire left side.  She thought she was having a stroke, but work up was negative.  It took a couple to resolve; she is seeing neurology later this fall to review.  She also brings up that she has noticed OCASIO when walking her dog.  In fact, she often cuts her walks shorter and will sit down to rest during her walks because of this.  She did buy a pulse oximeter and her levels are always 91-94. When she is in clinic it was higher and this confused her.  No cough or cardiac concerns that are new.   No new fevers.  Appetite is low, but weight is stable.  Doesn't like to cook for herself, too much effort.  Takes metamucil tablets and her bowels are well managed.          Current Outpatient Medications   Medication Sig Dispense Refill     acetaminophen (TYLENOL) 325 MG tablet Take 1 tablet (325 mg) by mouth every 6 hours as needed for mild pain or fever maximum 2000 mg (2 grams) per day       Calcium Carb-Cholecalciferol (CALCIUM + D3) 600-200 MG-UNIT TABS Take 1 tablet by mouth 2 times daily 60 tablet      COMPRESSION STOCKINGS Wear compression stockings at 20-30 mmHg rating most time during the day to the affected leg (left leg) or both legs. Take them off at night. 2 each 2     cycloSPORINE modified (GENERIC EQUIVALENT) 25 MG capsule Take 1 capsule (25 mg) by mouth 2 times  "daily or as directed 540 capsule 2     diphenhydrAMINE (BENADRYL ALLERGY) 25 MG tablet Take 25 mg by mouth At Bedtime  56 tablet      eltrombopag (PROMACTA) 50 MG tablet Take 3 tablets (150 mg) by mouth daily Administer on an empty stomach, 1 hour before or 2 hours after a meal. Or as directed 90 tablet 6     ORDER FOR DME Equipment being ordered: knee high compression stockings- 18-20 mm 1 Box 1     pantoprazole (PROTONIX) 20 MG EC tablet Take 1 tablet (20 mg) by mouth daily 90 tablet 11         Exam:  not currently breastfeeding.  Wt Readings from Last 4 Encounters:   09/16/20 57.2 kg (126 lb)   08/13/20 56.5 kg (124 lb 9.6 oz)   01/24/20 56.7 kg (125 lb)   01/16/20 56.7 kg (125 lb)     There were no vitals taken for this visit.   General: alert and no distress   Psych: Alert and oriented times; coherent speech, normal rate and volume, able to articulate logical thoughts, able to abstract reason, no tangential thoughts, no hallucinations or delusions  Patient's affect is appropriate.    Pulm: Speaking in full sentences, unlabored, no audible wheezes or cough.   The rest of a comprehensive physical examination is deferred due to PHE (public health emergency) video restrictions\"     Labs:      10/15/2020 14:29   Sodium 143   Potassium 4.4   Chloride 113 (H)   Carbon Dioxide 26   Urea Nitrogen 30   Creatinine 1.73 (H)   GFR Estimate 28 (L)   GFR Estimate If Black 32 (L)   Calcium 8.8   Anion Gap 4   Albumin 2.9 (L)   Protein Total 7.4   Bilirubin Total 2.6 (H)   Alkaline Phosphatase 83   ALT 63 (H)   AST 26   Glucose 126 (H)   WBC 2.4 (L)   Hemoglobin 8.7 (L)   Hematocrit 28.5 (L)   Platelet Count 91 (L)   RBC Count 2.98 (L)   MCV 96   MCH 29.2   MCHC 30.5 (L)   RDW 27.0 (H)   Diff Method Manual Differential   % Neutrophils 49.0   % Lymphocytes 34.0   % Monocytes 15.0   % Eosinophils 2.0   % Basophils 0.0   Nucleated RBCs 33 (H)   Absolute Neutrophil 1.2 (L)   Absolute Lymphocytes 0.8   Absolute Monocytes 0.4 "   Absolute Eosinophils 0.0   Absolute Basophils 0.0   Absolute Nucleated RBC 0.8   Anisocytosis Moderate   RBC Fragments Slight   Acanthocytes Moderate   Target Cells Slight   Platelet Estimate Automated count confirmed.  Platelet morphology is normal.       Assessment/plan:   73 year old female with AA, now has been transfusion INDEPENDENT since April 2017.     HEME- Treatment for AA 1/9-1/13/17 with ATG, methylpred, cyclosporine and eltrombopag. She has been independent from transfusions since April of 2017. Her CSA levels have been subtherapeutic however she developed renal toxicity with higher doses so we are keeping her at the following doses:  -cyclosporine 25 mg BID  -eltrombopag 150 mg daily  -counts  q6w     LFT elevation: resolved was thought to be 2/2 to too much tylenol.  Ibrahima back pain stable using a binder, still using 1 Tylenol QID     Renal: Creat is mildly elevated, improved from summer     HEME-  -Platelets stable at 91k.  Hgb stable at 8.7.   -two prior provoked DVT in 2012 and 2014 with travel. 2012 was associated with PE.   -no current anti coagulation    MSK- low back -Low thoracic mid spine pain which is stable. Has h/o compression fractures and has osteoporosis.     -s/p Prolia today for age related osteoporosis and compression fracture     FEN: bobby/weight stable.  Creat improved with lower CSA dosing. Lytes stable.      GI: h/o of GERD, taper off Protonix 20 mg.  Could add in Pepcid.      Health maintenance: sees pcp routinely. Encourage her to RTC to see her PCP with ongoing OCASIO.  This has been going on for months, not new.  I told her to walk with her pulse oximeter on and monitor her readings.  She had a flu shot.        Sejal Walls PA-C

## 2020-10-16 NOTE — PROGRESS NOTES
Patient receiving Prolia injection today. Her Calcium level drawn yesterday was 8.8. Corrected calcium was 9.68 mg/dL. However her creatinine was elevated at 1.73 with a calculated Creatinine Clearance of 22mL/min.    For patient with a creatinine clearance that is less than 30mL/min, the Prolia  recommends rechecking calcium level again after patient gets Prolia injection just to make sure they dont get hypocalcemia. The recommendation is to recheck calcium within 2 weeks after getting Prolia. Patient does appear to be getting labs on a regular basis.    Will route message to provider.    Alfred SimsD.

## 2020-11-06 DIAGNOSIS — D61.9 APLASTIC ANEMIA (H): Primary | ICD-10-CM

## 2020-11-12 ENCOUNTER — HOSPITAL ENCOUNTER (OUTPATIENT)
Facility: CLINIC | Age: 75
Setting detail: SPECIMEN
Discharge: HOME | End: 2020-11-12
Attending: INTERNAL MEDICINE | Admitting: PHYSICIAN ASSISTANT
Payer: COMMERCIAL

## 2020-11-12 ENCOUNTER — OFFICE VISIT (OUTPATIENT)
Dept: INFUSION THERAPY | Facility: CLINIC | Age: 75
End: 2020-11-12
Attending: INTERNAL MEDICINE
Payer: COMMERCIAL

## 2020-11-12 DIAGNOSIS — D61.9 APLASTIC ANEMIA (H): ICD-10-CM

## 2020-11-12 LAB
ACANTHOCYTES BLD QL SMEAR: ABNORMAL
ALBUMIN SERPL-MCNC: 3 G/DL (ref 3.4–5)
ALP SERPL-CCNC: 96 U/L (ref 40–150)
ALT SERPL W P-5'-P-CCNC: 101 U/L (ref 0–50)
ANION GAP SERPL CALCULATED.3IONS-SCNC: 6 MMOL/L (ref 3–14)
ANISOCYTOSIS BLD QL SMEAR: ABNORMAL
AST SERPL W P-5'-P-CCNC: 91 U/L (ref 0–45)
BASOPHILS # BLD AUTO: 0 10E9/L (ref 0–0.2)
BASOPHILS NFR BLD AUTO: 1 %
BILIRUB SERPL-MCNC: 2.6 MG/DL (ref 0.2–1.3)
BUN SERPL-MCNC: 31 MG/DL (ref 7–30)
BURR CELLS BLD QL SMEAR: SLIGHT
CALCIUM SERPL-MCNC: 7.9 MG/DL (ref 8.5–10.1)
CHLORIDE SERPL-SCNC: 110 MMOL/L (ref 94–109)
CO2 SERPL-SCNC: 23 MMOL/L (ref 20–32)
CREAT SERPL-MCNC: 1.55 MG/DL (ref 0.52–1.04)
DIFFERENTIAL METHOD BLD: ABNORMAL
EOSINOPHIL # BLD AUTO: 0.1 10E9/L (ref 0–0.7)
EOSINOPHIL NFR BLD AUTO: 2 %
ERYTHROCYTE [DISTWIDTH] IN BLOOD BY AUTOMATED COUNT: 21.2 % (ref 10–15)
GFR SERPL CREATININE-BSD FRML MDRD: 32 ML/MIN/{1.73_M2}
GLUCOSE SERPL-MCNC: 115 MG/DL (ref 70–99)
HCT VFR BLD AUTO: 30.2 % (ref 35–47)
HGB BLD-MCNC: 9.5 G/DL (ref 11.7–15.7)
LYMPHOCYTES # BLD AUTO: 1.5 10E9/L (ref 0.8–5.3)
LYMPHOCYTES NFR BLD AUTO: 45 %
MACROCYTES BLD QL SMEAR: PRESENT
MCH RBC QN AUTO: 29.6 PG (ref 26.5–33)
MCHC RBC AUTO-ENTMCNC: 31.5 G/DL (ref 31.5–36.5)
MCV RBC AUTO: 94 FL (ref 78–100)
MONOCYTES # BLD AUTO: 0.4 10E9/L (ref 0–1.3)
MONOCYTES NFR BLD AUTO: 13 %
NEUTROPHILS # BLD AUTO: 1.3 10E9/L (ref 1.6–8.3)
NEUTROPHILS NFR BLD AUTO: 39 %
NRBC # BLD AUTO: 0.3 10*3/UL
NRBC BLD AUTO-RTO: 9 /100
PLATELET # BLD AUTO: 93 10E9/L (ref 150–450)
PLATELET # BLD EST: ABNORMAL 10*3/UL
POTASSIUM SERPL-SCNC: 4.1 MMOL/L (ref 3.4–5.3)
PROT SERPL-MCNC: 7.3 G/DL (ref 6.8–8.8)
RBC # BLD AUTO: 3.21 10E12/L (ref 3.8–5.2)
RBC INCLUSIONS BLD: SLIGHT
SODIUM SERPL-SCNC: 139 MMOL/L (ref 133–144)
WBC # BLD AUTO: 3.3 10E9/L (ref 4–11)

## 2020-11-12 PROCEDURE — 80053 COMPREHEN METABOLIC PANEL: CPT | Performed by: PHYSICIAN ASSISTANT

## 2020-11-12 PROCEDURE — 85025 COMPLETE CBC W/AUTO DIFF WBC: CPT | Performed by: PHYSICIAN ASSISTANT

## 2020-11-12 PROCEDURE — 36415 COLL VENOUS BLD VENIPUNCTURE: CPT

## 2020-11-12 NOTE — LETTER
11/12/2020         RE: Bonny Raymundo  [address]  [place] [facility] [address]        Dear Colleague,    Thank you for referring your patient, Bonny Raymundo, to the [facility]. Please see a copy of my visit note below.    Medical Assistant Note:  Bonny Raymundo presents today for blood draw.    Patient seen by provider today: No.   present during visit today: Not Applicable.    Concerns: No Concerns.    Procedure:  Lab draw site: lac, Needle type: bf, Gauge: 23.    Post Assessment:  Labs drawn without difficulty: Yes.    Discharge Plan:  Departure Mode: Ambulatory.    Face to Face Time: 5 min  .    Bianca King [facility]              Again, thank you for allowing me to participate in the care of your patient.        Sincerely,         Lab Draw

## 2020-11-20 ENCOUNTER — TRANSFERRED RECORDS (OUTPATIENT)
Dept: HEALTH INFORMATION MANAGEMENT | Facility: CLINIC | Age: 75
End: 2020-11-20

## 2020-11-23 ENCOUNTER — PATIENT OUTREACH (OUTPATIENT)
Dept: ONCOLOGY | Facility: CLINIC | Age: 75
End: 2020-11-23

## 2020-11-23 NOTE — PROGRESS NOTES
"Oncology RN Care Coordination Note:     Incoming Call:  Patient left a voicemail stating she has some questions, patient states:      -she was placed on a baby aspirin, she thinks by neurology for numbness, but she isn't sure?  She wants to know who ordered it or why she is on it?  -is requesting her resting heart rate.  States \"I should have this information here somewhere, but I don't, can you look this up for me?\"  -is wondering when her last shingles shot was, states \"I think I am due for a shingles shot, but I can't remember when my last one was?\"  -states she was requested to have her labs drawn to have her creatinine levels drawn, \"I had them drawn a few weeks ago and I was wondering what Dr. Rogel thought of my creatinine levels?\"    Outgoing Call 11.24.2020:      Writer heard back from Sejal Walls PA-C, stating 1) patient's creatinine has improved slightly continue on same CSA dose.  2) patient's platelets are good in the 90k range, a baby ASA is ok.  3)  Patient never completed 2nd step of shingles vaccine in 2018, writer suggested she get her 2 part vaccine at her PCP clinic, she stated she her PCP wouldn't give her the vaccine, she would have to go to [facility] or something.  4)  Patient needs to reduce Tylenol from QID to BID due to LFT's slightly rising.  Bonny verbalized understanding.      No further questions at this time.           Karley Cunha, RN BSN   [facility]  Nurse Coordinator      "

## 2020-11-24 ENCOUNTER — OFFICE VISIT (OUTPATIENT)
Dept: FAMILY MEDICINE | Facility: CLINIC | Age: 75
End: 2020-11-24
Payer: COMMERCIAL

## 2020-11-24 ENCOUNTER — ANCILLARY PROCEDURE (OUTPATIENT)
Dept: GENERAL RADIOLOGY | Facility: CLINIC | Age: 75
End: 2020-11-24
Attending: PHYSICIAN ASSISTANT
Payer: COMMERCIAL

## 2020-11-24 VITALS
DIASTOLIC BLOOD PRESSURE: 74 MMHG | HEART RATE: 97 BPM | WEIGHT: 123 LBS | SYSTOLIC BLOOD PRESSURE: 106 MMHG | TEMPERATURE: 97 F | HEIGHT: 60 IN | OXYGEN SATURATION: 97 % | BODY MASS INDEX: 24.15 KG/M2

## 2020-11-24 DIAGNOSIS — D61.818 PANCYTOPENIA (H): ICD-10-CM

## 2020-11-24 DIAGNOSIS — I26.99 PULMONARY EMBOLISM, UNSPECIFIED CHRONICITY, UNSPECIFIED PULMONARY EMBOLISM TYPE, UNSPECIFIED WHETHER ACUTE COR PULMONALE PRESENT (H): ICD-10-CM

## 2020-11-24 DIAGNOSIS — R06.09 DOE (DYSPNEA ON EXERTION): ICD-10-CM

## 2020-11-24 DIAGNOSIS — D61.3 IDIOPATHIC APLASTIC ANEMIA (H): ICD-10-CM

## 2020-11-24 DIAGNOSIS — D61.9 APLASTIC ANEMIA (H): ICD-10-CM

## 2020-11-24 DIAGNOSIS — R20.2 TINGLING: ICD-10-CM

## 2020-11-24 DIAGNOSIS — R06.09 DOE (DYSPNEA ON EXERTION): Primary | ICD-10-CM

## 2020-11-24 PROCEDURE — 93000 ELECTROCARDIOGRAM COMPLETE: CPT | Performed by: PHYSICIAN ASSISTANT

## 2020-11-24 PROCEDURE — 99214 OFFICE O/P EST MOD 30 MIN: CPT | Performed by: PHYSICIAN ASSISTANT

## 2020-11-24 PROCEDURE — 71046 X-RAY EXAM CHEST 2 VIEWS: CPT | Performed by: INTERNAL MEDICINE

## 2020-11-24 ASSESSMENT — MIFFLIN-ST. JEOR: SCORE: 969.42

## 2020-11-24 NOTE — PROGRESS NOTES
HPI: Bonny is a 75 yo female here for f/u OCASIO   This is not a new issue  She did see Dr. Sadler and Siddharth  for this numbness in leg and started on Mg  She needed to stop that supplement due to diarrhea.  Neuro recd she stop her MVI and start a specific vitamin B12  She did have labs but I don't have those results yet.    Last Thursday she went to the grocery store and had to sit down 4 times due to L leg numbness and OCASIO.  Her sob has been going on for years ever since she was dx with aplastic anemia  Pt denies chest pain or palpit.    Pt followed by [facility] for apastic anemia    Lab Results   Component Value Date    WBC 3.3 11/12/2020     Lab Results   Component Value Date    RBC 3.21 11/12/2020     Lab Results   Component Value Date    HGB 9.5 11/12/2020     Lab Results   Component Value Date    HCT 30.2 11/12/2020     Lab Results   Component Value Date    MCV 94 11/12/2020     Lab Results   Component Value Date    MCH 29.6 11/12/2020     Lab Results   Component Value Date    MCHC 31.5 11/12/2020     Lab Results   Component Value Date    RDW 21.2 11/12/2020     Lab Results   Component Value Date    PLT 93 11/12/2020     Past Medical History:   Diagnosis Date     Allergic rhinitis due to other allergen      Aplastic anemia (H) 1/9/2017     Closed anterior dislocation of humerus      DVT of lower extremity (deep venous thrombosis) (H) 10-12    Left after prolonged sitting-plane     DVT, recurrent, lower extremity, acute (H) 2014     Headache(784.0)      Osteoporosis, unspecified      Pulmonary emboli (H) 10-12     Sensorineural hearing loss, unspecified      Sprain of ankle, unspecified site     L     Past Surgical History:   Procedure Laterality Date     ARTHRODESIS FOOT  7-18-11    Hallux valgus R-1st MP joint     BLEPHAROPLASTY BILATERAL  2012, 2014     BONE MARROW BIOPSY, BONE SPECIMEN, NEEDLE/TROCAR N/A 9/26/2016    Procedure: BIOPSY BONE MARROW;  Surgeon: Mu Morgan MD;  Location: [facility]     BONE  MARROW BIOPSY, BONE SPECIMEN, NEEDLE/TROCAR N/A 2016    Procedure: BIOPSY BONE MARROW;  Surgeon: Mu Morgan MD;  Location:  GI     C DEXA INTERPRETATION, AXIAL  03     CATARACT IOL, RT/LT Right      CATARACT IOL, RT/LT Left      COLONOSCOPY N/A 2018    Procedure: COLONOSCOPY;  colonoscopy;  Surgeon: Meliton Castrejon MD;  Location:  GI     EXCHANGE INTRAOCULAR LENS IMPLANT Right 2015    Procedure: EXCHANGE INTRAOCULAR LENS IMPLANT;  Surgeon: Garrett Dawson MD;  Location:  [facility]     HC COLONOSCOPY THRU STOMA, DIAGNOSTIC      normal- minimal diverticulosis     PICC INSERTION Left 2017    5fr DL BioFlo PICC, 42cm (2cm external) in the L basilic vein w/ tip in the  SVC RA junction.     VITRECTOMY PARSPLANA WITH 23 GAUGE SYSTEM Right 2015    Procedure: VITRECTOMY PARSPLANA WITH 23 GAUGE SYSTEM;  Surgeon: Racheal Loyd MD;  Location:  [facility]     ZZC NONSPECIFIC PROCEDURE           ZZC NONSPECIFIC PROCEDURE      hysterectomy/BSO     ZZC NONSPECIFIC PROCEDURE      myomectomy (fibroids)     Social History     Tobacco Use     Smoking status: Former Smoker     Quit date: 1973     Years since quittin.5     Smokeless tobacco: Never Used   Substance Use Topics     Alcohol use: No     Alcohol/week: 0.0 standard drinks     Comment: occasionally     Current Outpatient Medications   Medication Sig Dispense Refill     acetaminophen (TYLENOL) 325 MG tablet Take 1 tablet (325 mg) by mouth every 6 hours as needed for mild pain or fever maximum 2000 mg (2 grams) per day       Calcium Carb-Cholecalciferol (CALCIUM + D3) 600-200 MG-UNIT TABS Take 1 tablet by mouth 2 times daily 60 tablet      COMPRESSION STOCKINGS Wear compression stockings at 20-30 mmHg rating most time during the day to the affected leg (left leg) or both legs. Take them off at night. 2 each 2     cycloSPORINE modified (GENERIC EQUIVALENT) 25 MG capsule Take 1 capsule (25 mg)  by mouth 2 times daily or as directed 540 capsule 2     diphenhydrAMINE (BENADRYL ALLERGY) 25 MG tablet Take 25 mg by mouth At Bedtime  56 tablet      eltrombopag (PROMACTA) 50 MG tablet Take 3 tablets (150 mg) by mouth daily Administer on an empty stomach, 1 hour before or 2 hours after a meal. Or as directed 90 tablet 6     ORDER FOR DME Equipment being ordered: knee high compression stockings- 18-20 mm 1 Box 1     pantoprazole (PROTONIX) 20 MG EC tablet Take 1 tablet (20 mg) by mouth daily 90 tablet 11     Allergies   Allergen Reactions     Cats      Dogs      Seasonal Allergies      FAMILY HISTORY NOTED AND REVIEWED    PHYSICAL EXAM:    /74 (BP Location: Right arm, Patient Position: Chair, Cuff Size: Adult Large)   Pulse 97   Temp 97  F (36.1  C) (Oral)   Ht 1.524 m (5')   Wt 55.8 kg (123 lb)   SpO2 97%   Breastfeeding No   BMI 24.02 kg/m      Patient appears non toxic  Lungs: CTA bilat, no crackles  Heart: RRR without m/r/g.  Extr: no edema    EKG: no ischemic changes    Assessment and Plan:     (R06.00) OCASIO (dyspnea on exertion)  (primary encounter diagnosis)  Comment: She has had these sxs for years, but may be getting worse.    Plan: XR Chest 2 Views, Echocardiogram Complete            (R20.2) Tingling  Comment: chronic toe numbness. Also numbness in L leg. Will request records from neuro.  She will be starting vitamin B12.  Plan: EKG 12-lead complete w/read - Clinics            (D61.9) Aplastic anemia (H)  Comment:   Plan: followed by hem/onc    (I26.99) Pulmonary embolism, unspecified chronicity, unspecified pulmonary embolism type, unspecified whether acute cor pulmonale present (H)  Comment: pt had bilat lower lobe PEs in 2012  Plan: Pt states she is currently on asa although that is not on her med list      Shivani Phelps PA-C

## 2020-12-08 ENCOUNTER — HOSPITAL ENCOUNTER (OUTPATIENT)
Dept: CARDIOLOGY | Facility: CLINIC | Age: 75
Discharge: HOME | End: 2020-12-08
Attending: PHYSICIAN ASSISTANT | Admitting: PHYSICIAN ASSISTANT
Payer: COMMERCIAL

## 2020-12-08 DIAGNOSIS — R06.09 DOE (DYSPNEA ON EXERTION): ICD-10-CM

## 2020-12-08 PROCEDURE — 93306 TTE W/DOPPLER COMPLETE: CPT

## 2020-12-08 PROCEDURE — 93306 TTE W/DOPPLER COMPLETE: CPT | Mod: 26 | Performed by: INTERNAL MEDICINE

## 2020-12-08 NOTE — LETTER
2020      Bonny Parikh  [address]  [facility] [address]        Dear ,    We are writing to inform you of your test results.    Анна Draper,     This is to inform you regarding your test result.     I am covering for Shivani Cloud.   Echocardiogram result is satisfactory .   You have slightly leaky aortic valve.   Follow up with Shivani Cloud.     Resulted Orders   Echocardiogram Complete    Narrative    [id number]  [id number]  [id number]  [id number]^PEDRO LUIS^SHIVANI^MOLLY           [facility]  Echocardiography Laboratory  [address] [place] [address], [place] [address]        Name: BONNY PARIKH  MRN: [id number]  : 1945  Study Date: 2020 01:01 PM  Age: 74 yrs  Gender: Female  Patient Location: [facility]  Reason For Study: OCASIO (dyspnea on exertion)  Ordering Physician: SHIVANI CLOUD  Referring Physician: SHIVANI CLOUD  Performed By: Leola Thrasher     BSA: 1.5 m2  Height: 60 in  Weight: 123 lb  HR: 60  _____________________________________________________________________________  __        Procedure  Complete Portable Echo Adult.  _____________________________________________________________________________  __        Interpretation Summary     Technically difficult study.  The visual ejection fraction is estimated at 55-60%.  The right ventricle is normal in size and function.  Unable to assess mean RA pressure due to technically difficult study.  Right ventricular systolic pressure could not be approximated due to  inadequate tricuspid regurgitation.  Mild-moderate AI. No AS.     No prior studies for comparison.  _____________________________________________________________________________  __        Left Ventricle  The left ventricle is normal in size. There is borderline concentric left  ventricular hypertrophy. The visual ejection fraction is estimated at 55-60%.  Diastolic Doppler findings (E/E' ratio and/or other parameters) suggest left  ventricular filling  pressures are indeterminate.     Right Ventricle  The right ventricle is normal in size and function.     Atria  Normal left atrial size. Right atrial size is normal.     Mitral Valve  There is mild mitral annular calcification. There is trace mitral  regurgitation. There is no mitral valve stenosis.        Tricuspid Valve  Right ventricular systolic pressure could not be approximated due to  inadequate tricuspid regurgitation. There is trace to mild tricuspid  regurgitation.     Aortic Valve  The aortic valve is trileaflet with aortic valve sclerosis. There is mild to  moderate (1-2+) aortic regurgitation. No hemodynamically significant valvular  aortic stenosis.     Pulmonic Valve  The pulmonic valve is not well visualized.     Vessels  The aortic root is normal size. Normal size ascending aorta. Unable to assess  mean RA pressure due to technically difficult study.     Pericardium  There is no pericardial effusion.     _____________________________________________________________________________  __  MMode/2D Measurements & Calculations  IVSd: 1.1 cm  LVIDd: 4.1 cm  LVIDs: 2.5 cm  LVPWd: 0.92 cm  FS: 39.9 %     LV mass(C)d: 129.5 grams  LV mass(C)dI: 85.3 grams/m2  Ao root diam: 3.3 cm  LA dimension: 3.2 cm  asc Aorta Diam: 3.3 cm  LA/Ao: 0.98  LA Volume (BP): 43.0 ml  LA Volume Index (BP): 28.3 ml/m2  RWT: 0.45        Doppler Measurements & Calculations  MV E max gabby: 47.6 cm/sec  MV A max gabby: 73.2 cm/sec  MV E/A: 0.65     MV dec time: 0.23 sec  AI P1/2t: 566.2 msec  PA acc time: 0.10 sec  E/E' av.0  Lateral E/e': 4.9  Medial E/e': 13.0           _____________________________________________________________________________  __           Report approved by: Jericho Victro 2020 02:09 PM          If you have any questions or concerns, please call the clinic at the number listed above.       Sincerely,      Shivani Phelps PA-C

## 2020-12-15 ENCOUNTER — TELEPHONE (OUTPATIENT)
Dept: FAMILY MEDICINE | Facility: CLINIC | Age: 75
End: 2020-12-15

## 2020-12-15 DIAGNOSIS — R06.02 SOB (SHORTNESS OF BREATH): Primary | ICD-10-CM

## 2020-12-15 NOTE — TELEPHONE ENCOUNTER
Reason for Call:  Other appointment    Detailed comments:  Pt was told make a f/u appt for CT scan but openings . Please advise    Phone Number Patient can be reached at: Cell number on file:    Telephone Information:   Mobile [phone]       Best Time:  any    Can we leave a detailed message on this number? YES    Call taken on 12/15/2020 at 1:17 PM by Charlie Sheets

## 2020-12-23 ENCOUNTER — TELEPHONE (OUTPATIENT)
Dept: FAMILY MEDICINE | Facility: CLINIC | Age: 75
End: 2020-12-23

## 2020-12-23 DIAGNOSIS — N18.4 CKD (CHRONIC KIDNEY DISEASE) STAGE 4, GFR 15-29 ML/MIN (H): Primary | ICD-10-CM

## 2020-12-23 NOTE — TELEPHONE ENCOUNTER
"Detailed message left asking patient to call back     Please forward to [facility] Triage \"green calls\" line (Shanna ESTEBAN RN) at [phone] when patient calls back    Comment: Your provider has referred you to: N: Lucio Carias [phone]   [email or web address]   "

## 2020-12-23 NOTE — TELEPHONE ENCOUNTER
Yes the Cr is even worse than when I checked this. I want to have her see a nephrologist.  I will place the referral if you could have her call to set up appt.

## 2020-12-23 NOTE — TELEPHONE ENCOUNTER
Reason for call:  Other   Patient called regarding (reason for call): call back  Additional comments: Pt called stating she went in for a CT scan and they stopped mid scan with concerns about her kidney fuctions and levels. Pt does not have to reschedule CT until she gets cleared by her Provider. Please advise.     Phone number to reach patient:  Cell number on file:    Telephone Information:   Mobile [phone]           Can we leave a detailed message on this number?  YES

## 2020-12-23 NOTE — TELEPHONE ENCOUNTER
Patient returned call.     Information and scheduling number for [facility] given to patient.     Patient stated understanding and agreement with advise/plan.      Marcela CHOU RN,BSN

## 2021-01-08 ENCOUNTER — HOSPITAL ENCOUNTER (INPATIENT)
Facility: CLINIC | Age: 76
LOS: 2 days | Discharge: HOME | DRG: 809 | End: 2021-01-10
Attending: EMERGENCY MEDICINE | Admitting: HOSPITALIST
Payer: COMMERCIAL

## 2021-01-08 ENCOUNTER — PATIENT OUTREACH (OUTPATIENT)
Dept: ONCOLOGY | Facility: CLINIC | Age: 76
End: 2021-01-08

## 2021-01-08 ENCOUNTER — APPOINTMENT (OUTPATIENT)
Dept: GENERAL RADIOLOGY | Facility: CLINIC | Age: 76
DRG: 809 | End: 2021-01-08
Attending: EMERGENCY MEDICINE
Payer: COMMERCIAL

## 2021-01-08 DIAGNOSIS — R06.00 DYSPNEA, UNSPECIFIED TYPE: ICD-10-CM

## 2021-01-08 DIAGNOSIS — D61.9 APLASTIC ANEMIA (H): ICD-10-CM

## 2021-01-08 LAB
ALBUMIN SERPL-MCNC: 2.7 G/DL (ref 3.4–5)
ALP SERPL-CCNC: 131 U/L (ref 40–150)
ALT SERPL W P-5'-P-CCNC: 245 U/L (ref 0–50)
ANION GAP SERPL CALCULATED.3IONS-SCNC: 9 MMOL/L (ref 3–14)
AST SERPL W P-5'-P-CCNC: 196 U/L (ref 0–45)
BILIRUB SERPL-MCNC: 6.2 MG/DL (ref 0.2–1.3)
BLD PROD TYP BPU: NORMAL
BLD UNIT ID BPU: 0
BLOOD PRODUCT CODE: NORMAL
BPU ID: NORMAL
BUN SERPL-MCNC: 32 MG/DL (ref 7–30)
CALCIUM SERPL-MCNC: 8.1 MG/DL (ref 8.5–10.1)
CHLORIDE SERPL-SCNC: 103 MMOL/L (ref 94–109)
CO2 SERPL-SCNC: 23 MMOL/L (ref 20–32)
CREAT SERPL-MCNC: 1.83 MG/DL (ref 0.52–1.04)
D DIMER PPP FEU-MCNC: 2.3 UG/ML FEU (ref 0–0.5)
FLUAV RNA RESP QL NAA+PROBE: NEGATIVE
FLUBV RNA RESP QL NAA+PROBE: NEGATIVE
GFR SERPL CREATININE-BSD FRML MDRD: 26 ML/MIN/{1.73_M2}
GLUCOSE SERPL-MCNC: 122 MG/DL (ref 70–99)
LABORATORY COMMENT REPORT: NORMAL
NT-PROBNP SERPL-MCNC: 600 PG/ML (ref 0–1800)
POTASSIUM SERPL-SCNC: 3.9 MMOL/L (ref 3.4–5.3)
PROT SERPL-MCNC: 7.1 G/DL (ref 6.8–8.8)
RSV RNA SPEC QL NAA+PROBE: NEGATIVE
SARS-COV-2 RNA RESP QL NAA+PROBE: NEGATIVE
SODIUM SERPL-SCNC: 135 MMOL/L (ref 133–144)
SPECIMEN SOURCE: NORMAL
TRANSFUSION STATUS PATIENT QL: NORMAL
TRANSFUSION STATUS PATIENT QL: NORMAL
TROPONIN I SERPL-MCNC: <0.015 UG/L (ref 0–0.04)

## 2021-01-08 PROCEDURE — 84484 ASSAY OF TROPONIN QUANT: CPT | Performed by: EMERGENCY MEDICINE

## 2021-01-08 PROCEDURE — 86923 COMPATIBILITY TEST ELECTRIC: CPT | Performed by: EMERGENCY MEDICINE

## 2021-01-08 PROCEDURE — 99223 1ST HOSP IP/OBS HIGH 75: CPT | Mod: AI | Performed by: HOSPITALIST

## 2021-01-08 PROCEDURE — 86900 BLOOD TYPING SEROLOGIC ABO: CPT | Performed by: EMERGENCY MEDICINE

## 2021-01-08 PROCEDURE — 99285 EMERGENCY DEPT VISIT HI MDM: CPT | Mod: 25

## 2021-01-08 PROCEDURE — 86850 RBC ANTIBODY SCREEN: CPT | Performed by: EMERGENCY MEDICINE

## 2021-01-08 PROCEDURE — 93005 ELECTROCARDIOGRAM TRACING: CPT

## 2021-01-08 PROCEDURE — 85379 FIBRIN DEGRADATION QUANT: CPT | Performed by: HOSPITALIST

## 2021-01-08 PROCEDURE — 80053 COMPREHEN METABOLIC PANEL: CPT | Performed by: EMERGENCY MEDICINE

## 2021-01-08 PROCEDURE — 71045 X-RAY EXAM CHEST 1 VIEW: CPT

## 2021-01-08 PROCEDURE — 83880 ASSAY OF NATRIURETIC PEPTIDE: CPT | Performed by: EMERGENCY MEDICINE

## 2021-01-08 PROCEDURE — 87636 SARSCOV2 & INF A&B AMP PRB: CPT | Performed by: EMERGENCY MEDICINE

## 2021-01-08 PROCEDURE — C9803 HOPD COVID-19 SPEC COLLECT: HCPCS

## 2021-01-08 PROCEDURE — P9040 RBC LEUKOREDUCED IRRADIATED: HCPCS | Performed by: EMERGENCY MEDICINE

## 2021-01-08 PROCEDURE — 86901 BLOOD TYPING SEROLOGIC RH(D): CPT | Performed by: EMERGENCY MEDICINE

## 2021-01-08 PROCEDURE — 85025 COMPLETE CBC W/AUTO DIFF WBC: CPT | Performed by: EMERGENCY MEDICINE

## 2021-01-08 PROCEDURE — 120N000001 HC R&B MED SURG/OB

## 2021-01-08 ASSESSMENT — ENCOUNTER SYMPTOMS
SHORTNESS OF BREATH: 1
FEVER: 0
NUMBNESS: 1
DIZZINESS: 1

## 2021-01-08 ASSESSMENT — MIFFLIN-ST. JEOR: SCORE: 973.5

## 2021-01-08 NOTE — PROGRESS NOTES
Received VM from pt stating she needs help with paperwork for [employer] so she can get her Promacta prescription per her conversation with specialty pharmacy ([facility]).    Looks like [facility] assisted with this form last year per chart review but I am not remembering the alpha breakdown so including whole team as well.    I will call pt and let her know forwarded information to Karley and the pharm liaison team, including [facility].      Left message with for pt with above information but cut off while including pharm liaison team phone number.  Called back and spoke with pt sharing information above.  Pt appreciated information.

## 2021-01-09 ENCOUNTER — APPOINTMENT (OUTPATIENT)
Dept: ULTRASOUND IMAGING | Facility: CLINIC | Age: 76
DRG: 809 | End: 2021-01-09
Attending: HOSPITALIST
Payer: COMMERCIAL

## 2021-01-09 LAB
ALBUMIN SERPL-MCNC: 2.2 G/DL (ref 3.4–5)
ALP SERPL-CCNC: 109 U/L (ref 40–150)
ALT SERPL W P-5'-P-CCNC: 175 U/L (ref 0–50)
ANION GAP SERPL CALCULATED.3IONS-SCNC: 7 MMOL/L (ref 3–14)
AST SERPL W P-5'-P-CCNC: 116 U/L (ref 0–45)
BILIRUB DIRECT SERPL-MCNC: 2.2 MG/DL (ref 0–0.2)
BILIRUB SERPL-MCNC: 5.3 MG/DL (ref 0.2–1.3)
BUN SERPL-MCNC: 28 MG/DL (ref 7–30)
CALCIUM SERPL-MCNC: 8.1 MG/DL (ref 8.5–10.1)
CHLORIDE SERPL-SCNC: 107 MMOL/L (ref 94–109)
CO2 SERPL-SCNC: 25 MMOL/L (ref 20–32)
CREAT SERPL-MCNC: 1.61 MG/DL (ref 0.52–1.04)
ERYTHROCYTE [DISTWIDTH] IN BLOOD BY AUTOMATED COUNT: 23.9 % (ref 10–15)
GFR SERPL CREATININE-BSD FRML MDRD: 31 ML/MIN/{1.73_M2}
GLUCOSE SERPL-MCNC: 107 MG/DL (ref 70–99)
HCT VFR BLD AUTO: 25.1 % (ref 35–47)
HGB BLD-MCNC: 8.3 G/DL (ref 11.7–15.7)
LDH SERPL L TO P-CCNC: 269 U/L (ref 81–234)
MCH RBC QN AUTO: 28.3 PG (ref 26.5–33)
MCHC RBC AUTO-ENTMCNC: 33.1 G/DL (ref 31.5–36.5)
MCV RBC AUTO: 86 FL (ref 78–100)
PLATELET # BLD AUTO: 102 10E9/L (ref 150–450)
POTASSIUM SERPL-SCNC: 4.4 MMOL/L (ref 3.4–5.3)
PROT SERPL-MCNC: 6.1 G/DL (ref 6.8–8.8)
RBC # BLD AUTO: 2.93 10E12/L (ref 3.8–5.2)
RETICS # AUTO: 25.8 10E9/L (ref 25–95)
RETICS/RBC NFR AUTO: 0.9 % (ref 0.5–2)
SODIUM SERPL-SCNC: 139 MMOL/L (ref 133–144)
WBC # BLD AUTO: 3.7 10E9/L (ref 4–11)

## 2021-01-09 PROCEDURE — 85045 AUTOMATED RETICULOCYTE COUNT: CPT | Performed by: HOSPITALIST

## 2021-01-09 PROCEDURE — 85027 COMPLETE CBC AUTOMATED: CPT | Performed by: HOSPITALIST

## 2021-01-09 PROCEDURE — 83615 LACTATE (LD) (LDH) ENZYME: CPT | Performed by: HOSPITALIST

## 2021-01-09 PROCEDURE — 76705 ECHO EXAM OF ABDOMEN: CPT

## 2021-01-09 PROCEDURE — 250N000013 HC RX MED GY IP 250 OP 250 PS 637: Performed by: HOSPITALIST

## 2021-01-09 PROCEDURE — 82248 BILIRUBIN DIRECT: CPT | Performed by: HOSPITALIST

## 2021-01-09 PROCEDURE — 36415 COLL VENOUS BLD VENIPUNCTURE: CPT | Performed by: HOSPITALIST

## 2021-01-09 PROCEDURE — 99223 1ST HOSP IP/OBS HIGH 75: CPT | Performed by: INTERNAL MEDICINE

## 2021-01-09 PROCEDURE — 83010 ASSAY OF HAPTOGLOBIN QUANT: CPT | Performed by: HOSPITALIST

## 2021-01-09 PROCEDURE — 80053 COMPREHEN METABOLIC PANEL: CPT | Performed by: HOSPITALIST

## 2021-01-09 PROCEDURE — 120N000001 HC R&B MED SURG/OB

## 2021-01-09 PROCEDURE — 250N000012 HC RX MED GY IP 250 OP 636 PS 637: Performed by: HOSPITALIST

## 2021-01-09 PROCEDURE — 99232 SBSQ HOSP IP/OBS MODERATE 35: CPT | Performed by: INTERNAL MEDICINE

## 2021-01-09 RX ORDER — ONDANSETRON 4 MG/1
4 TABLET, ORALLY DISINTEGRATING ORAL EVERY 6 HOURS PRN
Status: DISCONTINUED | OUTPATIENT
Start: 2021-01-09 | End: 2021-01-10 | Stop reason: HOSPADM

## 2021-01-09 RX ORDER — PROCHLORPERAZINE 25 MG
12.5 SUPPOSITORY, RECTAL RECTAL EVERY 12 HOURS PRN
Status: DISCONTINUED | OUTPATIENT
Start: 2021-01-09 | End: 2021-01-10 | Stop reason: HOSPADM

## 2021-01-09 RX ORDER — LIDOCAINE 40 MG/G
CREAM TOPICAL
Status: DISCONTINUED | OUTPATIENT
Start: 2021-01-09 | End: 2021-01-10 | Stop reason: HOSPADM

## 2021-01-09 RX ORDER — CYCLOSPORINE 25 MG/1
25 CAPSULE, LIQUID FILLED ORAL 2 TIMES DAILY
Status: DISCONTINUED | OUTPATIENT
Start: 2021-01-09 | End: 2021-01-10 | Stop reason: HOSPADM

## 2021-01-09 RX ORDER — PANTOPRAZOLE SODIUM 20 MG/1
20 TABLET, DELAYED RELEASE ORAL DAILY
Status: DISCONTINUED | OUTPATIENT
Start: 2021-01-09 | End: 2021-01-10 | Stop reason: HOSPADM

## 2021-01-09 RX ORDER — PROCHLORPERAZINE MALEATE 5 MG
5 TABLET ORAL EVERY 6 HOURS PRN
Status: DISCONTINUED | OUTPATIENT
Start: 2021-01-09 | End: 2021-01-10 | Stop reason: HOSPADM

## 2021-01-09 RX ORDER — ONDANSETRON 2 MG/ML
4 INJECTION INTRAMUSCULAR; INTRAVENOUS EVERY 6 HOURS PRN
Status: DISCONTINUED | OUTPATIENT
Start: 2021-01-09 | End: 2021-01-10 | Stop reason: HOSPADM

## 2021-01-09 RX ADMIN — CYCLOSPORINE 25 MG: 25 CAPSULE, LIQUID FILLED ORAL at 09:51

## 2021-01-09 RX ADMIN — PANTOPRAZOLE SODIUM 20 MG: 20 TABLET, DELAYED RELEASE ORAL at 09:51

## 2021-01-09 RX ADMIN — CYCLOSPORINE 25 MG: 25 CAPSULE, LIQUID FILLED ORAL at 21:55

## 2021-01-09 ASSESSMENT — ACTIVITIES OF DAILY LIVING (ADL)
ADLS_ACUITY_SCORE: 14
WEAR_GLASSES_OR_BLIND: YES
DIFFICULTY_EATING/SWALLOWING: NO
CONCENTRATING,_REMEMBERING_OR_MAKING_DECISIONS_DIFFICULTY: NO
TOILETING_ISSUES: NO
DIFFICULTY_COMMUNICATING: NO
WALKING_OR_CLIMBING_STAIRS_DIFFICULTY: YES
ADLS_ACUITY_SCORE: 14
DOING_ERRANDS_INDEPENDENTLY_DIFFICULTY: YES
ADLS_ACUITY_SCORE: 14
PATIENT_/_FAMILY_COMMUNICATION_STYLE: SPOKEN LANGUAGE (ENGLISH OR BILINGUAL)
HEARING_DIFFICULTY_OR_DEAF: YES
DRESSING/BATHING_DIFFICULTY: NO
USE_OF_HEARING_ASSISTIVE_DEVICES: BILATERAL HEARING AIDS
HEARING_MANAGEMENT: HEARING AIDS AT HOME
ADLS_ACUITY_SCORE: 14
ADLS_ACUITY_SCORE: 14

## 2021-01-09 NOTE — PROGRESS NOTES
[facility]  Hospitalist Progress Note  Fili Momin MD  01/09/2021    Assessment & Plan   Bonny Raymundo is a 75 year old female with a history of idiopathic aplastic anemia, chronic kidney disease stage IV, DVT, and pulmonary embolism who presented to the ER with shortness of breath.  After evaluation in the ER, she was noted to have a hemoglobin of 7.7 and elevated LFTs.  She was transfused with 1 unit of PRBCs and the hospitalist service was contacted to admit her for further evaluation and management.     Acute on chronic anemia  Idiopathic aplastic anemia  Pancytopenia    Baseline hemoglobin is in the 8-9 range.    No signs/symptoms of bleeding.    Hemoglobin down to 7.7 improved to 8.3.      Hematology notes recommend transfusion to keep hemoglobin greater than 8 and platelets greater than 30.    1 unit PRBCs ordered in the ER.7.7 > 8.3     Followed by Dr. Rogel at the  as an outpatient.  Appreciate hem/onc consult    Continue PTA cyclosporine.    Hold PTA Promacta pending improvement of LTS     Shortness of breath - suspect from anemia    Not hypoxic or tachypneic.  No respiratory distress.    Chest x-ray shows no acute cardiopulmonary disease.    Possibly related to worsening anemia, will reevaluate after PRBC transfusion.    D-dimer elevated, however lab stated this was possibly due to significantly elevated pigment in her blood.  Suspicion for PE is low, but could consider further work-up if symptoms do not improve after PRBC transfusion.    Transthoracic echocardiogram on 12/8/2020 showed mild to moderate aortic regurgitation but was otherwise fairly unremarkable.     Elevated LFTs    Total bilirubin 6.2, alk phos 131, , .    Etiology unclear, could be related to her medication for aplastic anemia.    No abdominal pain or tenderness.  No nausea, vomiting, or other GI symptoms.    Will get a right upper quadrant ultrasound.    Awaiting LFT's from  today    Will ask hematology to follow     Chronic kidney disease, stage IV    Baseline creatinine appears to be between 1.5 and 2.0.    Creatinine stable at 1.83 in the ER.    Recheck in a.m.     History of DVT and pulmonary embolism    Occurred in 2012.    Not currently on any anticoagulation.    D-dimer was elevated in the ER, however labs states this is likely abnormal due to significant pigment in her blood.    Current suspicion for pulmonary embolism is low.  Given elevated creatinine, cannot pursue CT scanning at this time.  Will treat other acute issues as noted above and reevaluate.     DVT Prophylaxis: Pneumatic Compression Devices  Code Status: Full Code  Expected discharge:   -- anticipate on 1/11    Interval History   -- chart reviewed  -- appreciate hem/onc consut    -Data reviewed today: I reviewed all new labs and imaging over the last 24 hours. I personally reviewed no images or EKG's today.    Physical Exam    , Blood pressure 134/80, pulse 85, temperature 98.6  F (37  C), temperature source Oral, resp. rate 16, height 1.524 m (5'), weight 56.7 kg (125 lb), SpO2 93 %, not currently breastfeeding.  Vitals:    01/08/21 1945   Weight: 56.7 kg (125 lb)     Vital Signs with Ranges  Temp:  [97.5  F (36.4  C)-98.6  F (37  C)] 98.6  F (37  C)  Pulse:  [] 85  Resp:  [13-21] 16  BP: ()/(49-82) 134/80  SpO2:  [93 %-97 %] 93 %  I/O's Last 24 hours  I/O last 3 completed shifts:  In: 300   Out: -     Constitutional: Awake, alert, cooperative, no apparent distress  Respiratory: Clear to auscultation bilaterally, no crackles or wheezing  Cardiovascular: Regular rate and rhythm, normal S1 and S2, and no murmur noted  GI: Normal bowel sounds, soft, non-distended, non-tender  Skin/Integumen: No rashes, no cyanosis, no edema  Other:      Medications   All medications were reviewed.      cycloSPORINE modified  25 mg Oral BID     pantoprazole  20 mg Oral Daily     sodium chloride (PF)  3 mL Intracatheter Q8H         Data   Recent Labs   Lab 01/09/21  1442 01/08/21 2000   WBC PENDING 3.9*   HGB 8.3* 7.7*   MCV 86 89   * 119*    135   POTASSIUM 4.4 3.9   CHLORIDE 107 103   CO2 PENDING 23   BUN PENDING 32*   CR PENDING 1.83*   ANIONGAP PENDING 9   LEO PENDING 8.1*   GLC PENDING 122*   ALBUMIN PENDING 2.7*   PROTTOTAL PENDING 7.1   BILITOTAL PENDING 6.2*   ALKPHOS PENDING 131   ALT PENDING 245*   AST PENDING 196*   TROPI  --  <0.015       Recent Results (from the past 24 hour(s))   XR Chest Port 1 View    Narrative    XR PORTABLE CHEST ONE VIEW   1/8/2021 9:00 PM     HISTORY: Short of breath.    COMPARISON: Chest x-ray 11/24/2020.      Impression    IMPRESSION: Portable chest. Lungs are clear. Heart is normal in size.  No pneumothorax. No definite pleural effusions.    SALVADOR STEIN MD   US Abdomen Limited    Narrative    ULTRASOUND ABDOMEN LIMITED 1/9/2021 9:28 AM    CLINICAL HISTORY: Elevated LFTs.    TECHNIQUE: Limited abdominal ultrasound.    COMPARISON: None.    FINDINGS:  GALLBLADDER: The gallbladder is normal. No gallstones, wall  thickening, or pericholecystic fluid. Negative sonographic Oneil's  sign.    BILE DUCTS: Mild biliary dilatation, possibly related to age. The  common duct measures 8 mm.    LIVER: Unremarkable echogenicity without focal lesions.    RIGHT KIDNEY: No hydronephrosis.    PANCREAS: The visualized portions of the pancreas are normal.    No ascites.      Impression    IMPRESSION:  Normal limited abdominal ultrasound.    MD Fili GARZA MD  Text Page  (7am to 6pm)

## 2021-01-09 NOTE — ED NOTES
Report received. Patient visualized. Vital signs are stable. Patient is resting in bed with television on. Patient denies any signs of blood transfusion reaction at this time. Lights dimmed per patient request. Will continue to monitor prior to admission

## 2021-01-09 NOTE — CONSULTS
Consult Date:  01/09/2021      REASON FOR CONSULTATION:  Bonny Raymundo is a 75-year-old patient we have been asked to see by Dr. Barahona in hematology consultation for a diagnosis of idiopathic aplastic anemia, shortness of breath and elevated liver function tests.      HISTORY OF PRESENTING COMPLAINT:   1.  Idiopathic aplastic anemia for approximately 3-4 years seen at the [facility] bone marrow transplant team.   2.  Admitted with increased shortness of breath and anemia.  Hemoglobin 7.7.   3.  Advancing elevated liver function tests, worsening over the last 2 months.      HISTORY OF PRESENTING COMPLAINT:  Bonny Raymundo is a 75-year-old patient who typically sees Dr. Chacko 12 03/03/2020 at the [facility].  She was last seen in the Hematology outpatient in 10/2020 and had her last blood work in 11/2020. Her history dates back to 2016 when she had pancytopenia and a bone marrow biopsy confirmed aplastic anemia.  She is followed at the [facility] and is currently on a treatment protocol of cyclosporine 25 mg b.i.d. and eltrombopag 150 mg daily.  She had normal liver function tests back in 08/2020 and then they started elevating in September.  She had been taking quite a bit of Tylenol and had been recommended to reduce the amount of Tylenol at her Hematology Clinic.  Her creatinine had been elevated and over time the cyclosporine dose has been lowered with an improvement in her creatinine.  On this occasion, she was admitted with increased shortness of breath and jaundice.  She had a chest x-ray done on admission, which was normal.  She also had an ultrasound of her abdomen today to look at her liver and gallbladder and biliary system and that was normal.  Of note, her liver function tests are much more elevated than what they were in 11/2020.  Currently, her bilirubin is 6.2, alkaline phosphatase is 131, ALT 45, AST is 196.  Of note, back in August,  her ALT was 19 and her AST was 12.  The had  gone up and were slightly improved when she came down on the Tylenol, but have gone back up again.  Her creatinine had improved with a lower dose of cyclosporine to 1.5 on admission, it was 1.83.  Her hemoglobin on admission was 7.7, down from 9.5 in November.  White cell count and platelets are stable for her.  We have been asked to help with her hematological management.  She is status post 1 unit of packed red blood cells in the emergency room and feels less short of breath today.      PAST MEDICAL HISTORY:   1.  Idiopathic aplastic anemia with pancytopenia.   2.  Acute on chronic anemia.   3.  History of admission with shortness of breath.   4.  Elevated liver function tests.   5.  Chronic renal insufficiency.   6.  History of deep venous thrombosis and pulmonary embolism.   7.  History of hearing loss.      PAST SURGICAL HISTORY:  History of bone marrow biopsy, history of foot surgery, history of cataract surgery, history of colonoscopy, history of lens implant, history of vitrectomy, history of hysterectomy and bilateral salpingo-oophorectomy, history of , history of myomectomy for uterine fibroids.      MEDICATIONS AND ALLERGIES:  Outlined in the nursing records.      SOCIAL HISTORY:  The patient is  and she lives alone.  She quit smoking about 47 years ago.  She does not use alcohol.      FAMILY HISTORY:  Unremarkable for any blood disorders.  Her father and brother both had heart disease.  Her mother had muscular sclerosis.      COMPREHENSIVE REVIEW OF SYSTEMS:  A 12-point comprehensive review of systems has been done with her.  She has been getting progressively short of breath over the last several months, but worse prior to this hospitalization.  She denies any fevers, chills or sweats.  No chest pain or cough.  She denies any GI or  symptomatology.  She did notice that her skin was slightly yellow.  She has no abdominal pain that is worrisome.  No constipation or diarrhea.  She does  get chronic headaches and has been taking 2-3 Tylenol per day for headaches, have asked her to stop this.      PHYSICAL EXAMINATION:   VITAL SIGNS:  Stable.   GENERAL:  She is alert and oriented x 3.  She has an icteric sclera.   HEENT:  Oropharynx clear, mucous membranes moist.   NECK:  Has no masses or goiter.   NECK:  There is no cervical, supraclavicular or infraclavicular adenopathy.   CHEST:  Clear to auscultation and percussion bilaterally.   HEART:  Sounds 1, 2 normal.  No added sounds, no murmurs.   SKIN:  Jaundiced.   ABDOMEN:  Soft and nontender, no hepatosplenomegaly.   EXTREMITIES:  Without tenderness or edema.   NEUROLOGIC:  The patient is alert and oriented x 3.      DATA REVIEW:  I have personally reviewed for the last several months all of her blood work as well as all of her imaging and notes from the Hematology Clinic.      IMPRESSION:  This is a patient with idiopathic aplastic anemia.  She presented with shortness of breath and hemoglobin is 7.7.  She was transfused 1 unit of packed red blood cells in the emergency room.  She will have repeat blood work with a CBC today or tomorrow.  She does feel less short of breath.  She has got worsening liver function tests since 09/2020 with an unremarkable liver ultrasound.  I have asked her to stop all Tylenol as she continues to take 2-3 tablets per day.  Promacta can also cause an elevation in liver function tests, so currently that is on hold.  Her AST and ALT are significantly elevated from her normal in 08/2020.  For that reason, I would hold the Promacta and the Tylenol and repeat liver function tests again tomorrow.  If we are seeing a trend downwards in the liver function, then we can restart of the Promacta again with the 25 mg reduction in the dose.  Her kidney function is not far off her baseline, with a GFR of 26.  I would continue her on a low dose of cyclosporine at 25 mg p.o. b.i.d.  We will follow her counts over the next couple of days.   We will also touch base with the Bone Marrow Transplant team on Monday if her situation does not look like it is improving.      COMPLEXITY OF CASE TODAY:  High.      Thank you for allowing us to participate in her care.         KATEY FUENTES MD             D: 2021   T: 2021   MT: ESTELITA      Name:     LISSETH PARIKH   MRN:      [id number]        Account:       [id number]   :      1945           Consult Date:  2021      Document: [id number]

## 2021-01-09 NOTE — UTILIZATION REVIEW
"  Admission Status; Secondary Review Determination     Admission Date: 1/8/2021  7:48 PM      Under the authority of the Utilization Management Committee, the utilization review process indicated a secondary review on the above patient.  The review outcome is based on review of the medical records, discussions with staff, and applying clinical experience noted on the date of the review.        (x)      Inpatient Status Appropriate - This patient's medical care is consistent with medical management for inpatient care and reasonable inpatient medical practice.      () Observation Status Appropriate - This patient does not meet hospital inpatient criteria and is placed in observation status. If this patient's primary payer is Medicare and was admitted as an inpatient, Condition Code 44 should be used and patient status changed to \"observation\".   () Admission Status NOT Appropriate - This patient's medical care is not consistent with medical management for Inpatient or Observation Status.          RATIONALE FOR DETERMINATION   Bonny Raymundo is a 75 year old female with a history of idiopathic aplastic anemia, chronic kidney disease stage IV, DVT, and pulmonary embolism who presented to the ER with shortness of breath.  She was found to have pancytopenia.  She was given a transfusion of packed red blood cells.  She is placed in the hospital for further evaluation of her pancytopenia.  She has been seen in consultation by hematology.She is expected to require a prolonged hospital stay.  Due to this patient's advanced age, complex past medical history, anemia with need for transfusion of packed red blood cells, need for hematology consult, and expectation of a prolonged hospital stay, it is appropriate to have this patient mated to the hospital as an inpatient.      The severity of illness, intensity of service provided, expected LOS and risk for adverse outcome make the care complex, high risk and appropriate for " hospital admission.        The information on this document is developed by the utilization review team in order for the business office to ensure compliance.  This only denotes the appropriateness of proper admission status and does not reflect the quality of care rendered.         The definitions of Inpatient Status and Observation Status used in making the determination above are those provided in the CMS Coverage Manual, Chapter 1 and Chapter 6, section 70.4.      Sincerely,     Jone Marshall D.O.  Utilization Review/ Case Management  [facility].

## 2021-01-09 NOTE — ED NOTES
"[facility]  ED Nurse Handoff Report    ED Chief complaint: Shortness of Breath      ED Diagnosis:   Final diagnoses:   Dyspnea, unspecified type   Aplastic anemia (H)       Code Status: Full Code    Allergies:   Allergies   Allergen Reactions     Cats      Dogs      Seasonal Allergies        Patient Story: pt with hx of aplastic anemia c/o sob x several months (\"since summer\") and also reports numbness in left lower leg x couple months. Pt states tonight while making supper she was having more sob and also having dizziness.   Focused Assessment:  Pt a&o x4, hard of hearing (pt states she left her hearing aids at home), sob on exertion, lungs clear on auscultation. Skin pale. Denies chest pain or abdominal pain. Numbness to left lower leg, but able to ambulate in room independently.     Treatments and/or interventions provided: labs, hgb is 7.7 - pt usually receives one unit of blood when hgb drops below 8, blood transfusion to be given. CXR negative  Patient's response to treatments and/or interventions: N/A    To be done/followed up on inpatient unit:  n/a    Does this patient have any cognitive concerns?: none    Activity level - Baseline/Home:  Independent  Activity Level - Current:   Stand with Assist    Patient's Preferred language: English   Needed?: No    Isolation: None and COVID r/o and special precautions  Infection: Not Applicable  COVID r/o and special precautions  Patient tested for COVID 19 prior to admission: YES  Bariatric?: No    Vital Signs:   Vitals:    01/08/21 1945 01/08/21 2000 01/08/21 2030 01/08/21 2100   BP: 96/49 108/73 111/73 117/72   Pulse: 119 101 95 92   Resp: 16 20 13    Temp: 97.5  F (36.4  C)      TempSrc: Oral      SpO2: 97% 96% 95% 95%   Weight: 56.7 kg (125 lb)      Height: 1.524 m (5')          Cardiac Rhythm:     Was the PSS-3 completed: yes  What interventions are required if any?               Family Comments: n/a  OBS brochure/video " discussed/provided to patient/family: N/A              Name of person given brochure if not patient: n/a              Relationship to patient: n/a    For the majority of the shift this patient's behavior was Green.   Behavioral interventions performed were reassurance and information.    ED NURSE PHONE NUMBER: *[phone]

## 2021-01-09 NOTE — PLAN OF CARE
Summary:     DATE & TIME: 1/9/21 DAY  Cognitive Concerns/ Orientation : A&Ox4   BEHAVIOR & AGGRESSION TOOL COLOR: Green  CIWA SCORE: NA   ABNL VS/O2: VSS on RA.   MOBILITY: Indep in room/steady on feet  PAIN MANAGMENT: Denies  DIET: Regular- good appetite.   BOWEL/BLADDER: Cont.   ABNL LAB/BG: Hgb 7.7, Cr 1.83, , , D-Dimer 2.3  DRAIN/DEVICES: PIV SL  TELEMETRY RHYTHM: NA  SKIN: Intact; slightly jaundiced.   TESTS/PROCEDURES: Abdominal U/S (-).   D/C DAY/GOALS/PLACE: Pending 1-2 days  OTHER IMPORTANT INFO: Hem/Onc saw today- will monitor LFT's.  Some minor OCASIO. L leg numbness present. Promacta held/Pt shouldn't have tylenol per hematology recs.

## 2021-01-09 NOTE — ED PROVIDER NOTES
History   Chief Complaint:  Shortness of Breath       HPI   Bonny Raymundo is a 75 year old female with history of DVT, aplastic anemia, and pulmonary embolisms whose son presented her to the ER with shortness of breath and numbness of the legs. She claims that she has had shortness of breath and numbness in her legs for several months and that she is unable to walk from one room to another without feeling short of breath. She says that the numbness in her legs is more prominent in the lower left leg and has been worse today compared to normal. She says she also has been experiencing dizziness. She states that she had gotten an MRI and chest xray a couple months ago and was found to have had a mini stroke. She states the main reason she came into the ER tonight was because she couldn't make her supper without feeling short of breath. She does not show any symptoms of fever or swelling of the legs.      Review of Systems   Constitutional: Negative for fever.   Respiratory: Positive for shortness of breath.    Cardiovascular: Negative for leg swelling.   Neurological: Positive for dizziness and numbness (legs).   All other systems reviewed and are negative.      Allergies:  The patient has no known drug allergies      Medications:  Cyclosporine modified  Benadryl  Promacta  Protonix      Past Medical History:    Allergic rhinitis due to other allergen  Aplastic anemia (H)  Closed anterior dislocation of humerus  DVT of lower extremity (H)  DVT, recurrent, lower extremity, acute (H)  Headache (784.0)  Osteoporosis, unspecified  Pulmonary emboli (H)  Sensorineural hearing loss of both ears  Sprain of ankle, unspecified site  Personal history of other drug therapy  Osteoporotic compression fracture of spine, with routine healing, subsequent encounter  Diplopia  Pseudophakia  Myopia of both eyes  Thyroid nodule  Pancytopenia  History of Pulmonary Embolism  BPPV  Anemia  Cardiovascular Screening       Past Surgical  History:    Arthrodesis Foot  Blepharoplasty  Bone Marrow Biopsy, bone specimen, needle/trocar x 2  Dexa interpretation, axial  Cataract IOL, bilateral  Colonoscopy  Exchange intraocular lens implant  Colonoscopy thru stoma, diagnostic  PICC insertion  Vitrectomy parsplana with 23 gauge system, right   section  Hysterectomy/BSO  Myomectomy (fibroids)  Tonsillectomy  [place] teeth extraction       Family History:    Musculoskeletal disorder, MS  Heart disease, afib  Hypertension  Hyperlipidemia      Social History:  The patient lives alone.  The patient's son presented her to the ED.    Physical Exam     Patient Vitals for the past 24 hrs:   BP Temp Temp src Pulse Resp SpO2 Height Weight   21 117/72 -- -- 92 -- 95 % -- --   21 111/73 -- -- 95 13 95 % -- --   21 108/73 -- -- 101 20 96 % -- --   21 1945 96/49 97.5  F (36.4  C) Oral 119 16 97 % 1.524 m (5') 56.7 kg (125 lb)       Physical Exam  Physical Exam   Nursing note and vitals reviewed.  General: Oriented to person, place, and time. Appears well-developed and well-nourished.   Head: No signs of trauma.   Mouth/Throat: Oropharynx is clear and moist.   Eyes: Conjunctivae are normal. Pupils are equal, round, and reactive to light.   Neck: Normal range of motion. No nuchal rigidity.   Cardiovascular: Normal rate and regular rhythm.    Respiratory: Effort normal and breath sounds normal. No respiratory distress.   Abdominal: Soft. There is no tenderness. There is no guarding.   Musculoskeletal: Normal range of motion. no edema.   Neurological: The patient is alert and oriented to person, place, and time.  PERRLA, EOMI, visual fields intact, strength in upper/lower extremities normal and symmetrical.   Sensation normal. Speech normal  GCS eye subscore is 4. GCS verbal subscore is 5. GCS motor subscore is 6.   Skin: Skin is warm and dry. No rash noted.   Psychiatric: normal mood and affect. behavior is normal.        Emergency Department Course     ECG:  ECG taken at 2028, ECG read at 2035  Sinus rhythm with occasional premature ventricular complexes  Left anterior fascicular block  Possible Lateral infarct, age undetermined  Abnormal ECG  Rate 97 bpm. MI interval 150 ms. QRS duration 82 ms. QT/QTc 348/441 ms. P-R-T axes 42 -45 20.        Imaging:  XR Chest Port 1 View  Portable chest. Lungs are clear. Heart is normal in size.  No pneumothorax. No definite pleural effusions.    SALVADOR STEIN MD      Laboratory:  CBC: WBC 3.9 (L), HGB 7.7 (L),  (L)   CMP: Glucose 122 (H), BUN 32 (H), GFR 26 (L), Calcium 8.1 (L), Bilirubin total 6.2 (H), Albumin 2.7 (L),  (H),  (H) (Creatinine: 1.83 (H)) o/w WNL    Troponin (Collected 2000): <0.015  BNP: 600    Symptomatic Influenza A/B & SARS-CoV2 (COVID-19) Virus PCR Multiplex: pending    Emergency Department Course:    Reviewed:  1949 I reviewed nursing notes, vitals, past medical history and care everywhere    Assessments:  2012 I obtained history and examined the patient as noted above.   2137 I rechecked the patient and explained findings.     Consults:   2135 I consulted Dr. Barahona of the hospitalist service. They agree to accept care of the patient.    Disposition:  2131 The patient was admitted to the hospital under the care of Dr. Barahona.       Impression & Plan     Medical Decision Making:  Bonny Raymundo is a 75 year old female who presents for evaluation of shortness of breath.  She is anemic.  A broad differential was of course considered, most probable etiologies include anemia of chronic disease, genetic disorder with abnormal hemoglobin, acute blood loss, slow or fast gastrointestinal bleeding, iron deficiency anemia, etc.  Given exam, history and labs, I feel the most likely etiology of her anemia is her chronic aplastic anemia.      Based on hgb and dyspnea, I recommended blood transfusion and admission to the hospital. They consent to this. Discussed with  medicine team who accepted patient for admission.      Critical Care Time: was 40 minutes for this patient excluding procedures    Covid-19  Bonny aRymundo was evaluated during a global COVID-19 pandemic, which necessitated consideration that the patient might be at risk for infection with the SARS-CoV-2 virus that causes COVID-19.   Applicable protocols for evaluation were followed during the patient's care.   COVID-19 was considered as part of the patient's evaluation. The plan for testing is:  a test was obtained during this visit.    Diagnosis:    ICD-10-CM    1. Dyspnea, unspecified type  R06.00 ABO/Rh type and screen   2. Aplastic anemia (H)  D61.9        Discharge Medications:  New Prescriptions    No medications on file       Scribe Disclosure:  IMilton, am serving as a scribe at 7:55 PM on 1/8/2021 to document services personally performed by Daniel Phillips MD based on my observations and the provider's statements to me.     Scribe Disclosure:  I, Graciela To, am serving as a scribe () at 9:01 PM on 1/8/2021 to document services personally performed by Daniel Phillips MD based on my observations and the provider's statements to me.         Daniel Phillips MD  01/08/21 0190

## 2021-01-09 NOTE — PLAN OF CARE
Admission    Patient arrives to room 637-2 via cart from ED.  Care plan note: A&Ox4. VSS on RA. Up with SBA. Blood infusion in progress.    Inpatient nursing criteria listed below were met:    PCD's Documented: Yes  Skin issues/needs documented :NA  Isolation education started/completed NA  Patient allergies verified with patient: No  Verified completion of [place] Risk Assessment Tool:  Yes  Verified completion of Guardianship screening tool: Yes  Fall Prevention: Care plan updated, Education given and documented Yes  Care Plan initiated: Yes  Home medications documented in belongings flowsheet: NA  Patient belongings documented in [place] flowsheet: Yes  Reminder note (belongings/ medications) placed in discharge instructions:Yes  Admission profile/ required documentation complete: No  Bedside Report Letter given and explained to patient No  Visitor Designated? No  If patient is a 72 hour hold/Commitment are belongings removed from room and locked up? NA

## 2021-01-09 NOTE — ED TRIAGE NOTES
Pt reports months of SOB. Pt states her SOB became too much to take tonight. Pt states that she also experiences L leg numbness.

## 2021-01-09 NOTE — PHARMACY-ADMISSION MEDICATION HISTORY
Pharmacy Medication History  Admission medication history interview status for the 1/8/2021  admission is complete. See EPIC admission navigator for prior to admission medications       Medication history sources: Patient  Location of interview: Phone  Adherence Assessment: Good    Significant changes made to the medication list:  Added: Vit B12  Removed: Calcium + vit D3      Additional medication history information:   None    Medication reconciliation completed by provider prior to medication history? No    Time spent in this activity: 15 mins      Prior to Admission medications    Medication Sig Last Dose Taking? Auth Provider   acetaminophen (TYLENOL) 325 MG tablet Take 1 tablet (325 mg) by mouth every 6 hours as needed for mild pain or fever maximum 2000 mg (2 grams) per day  at PRN Yes Rafita Rogel MD   Cyanocobalamin (VITAMIN B-12 PO) Take 1 tablet by mouth daily 1/8/2021 at AM Yes Unknown, Entered By History   cycloSPORINE modified (GENERIC EQUIVALENT) 25 MG capsule Take 25 mg by mouth 2 times daily  1/8/2021 at Unknown time Yes Rafita Rogel MD   diphenhydrAMINE (BENADRYL ALLERGY) 25 MG tablet Take 25 mg by mouth At Bedtime  1/7/2021 at PM Yes Lana Panchal PA-C   eltrombopag (PROMACTA) 50 MG tablet Take 150 mg by mouth At Bedtime Administer on an empty stomach, 1 hour before or 2 hours after a meal. 1/7/2021 at PM Yes Unknown, Entered By History   pantoprazole (PROTONIX) 20 MG EC tablet Take 1 tablet (20 mg) by mouth daily 1/8/2021 at AM Yes Rafita Rogel MD   COMPRESSION STOCKINGS Wear compression stockings at 20-30 mmHg rating most time during the day to the affected leg (left leg) or both legs. Take them off at night.   Sam Tafoya PA-C   ORDER FOR DME Equipment being ordered: knee high compression stockings- 18-20 mm   Donna Redd MD       The information provided in this note is only as accurate as the sources available at the time of the  update(s).     Bethany ShahD

## 2021-01-09 NOTE — CONSULTS
"Asked to see patient for positive nutrition screen -->   Weight loss - \"Unsure\" - \"States she has lost appetite, tries to eat small portions frequently\"    Per review of weight history in [facility] -->    Wt Readings from Last 10 Encounters:   01/08/21 56.7 kg (125 lb)   11/24/20 55.8 kg (123 lb)   09/16/20 57.2 kg (126 lb)   08/13/20 56.5 kg (124 lb 9.6 oz)   01/24/20 56.7 kg (125 lb)   01/16/20 56.7 kg (125 lb)   11/20/19 56.3 kg (124 lb 1.6 oz)   10/03/19 55.8 kg (123 lb)   08/27/19 56.2 kg (124 lb)   08/21/19 56.1 kg (123 lb 9.6 oz)     No weight loss noted, BMI 24.41 kg/m^2 (normal)    Per RN notes, appetite is good  Patient is ordering good sized meals    Breakfast today was omelet, banana bread, grapes, and OJ  Lunch consisted of soup, sandwich, OJ, Vital Water, and pudding     Will follow up at LOS per department protocol or sooner if consulted    Susan Austin, RD, LD, CNSC   Clinical Dietitian - [facility]       "

## 2021-01-09 NOTE — PROGRESS NOTES
Heme       Seen and examined   Chart reviewed     Full consult to follow     Worsening LFTs since Sept 2020    , were normal in August    US liver unremarkable     She should stop all Tylenol  , takes 2-3 per day     Hold Promacta for now     Repeat LFTs tomorrow     If Promacta and tylenol combination is the cause then when we see a trend downwards we can start up Promacta again with a 25 mg reduction in dose     Repeat Hb post transfusion     Low dose cyclosporine can continue       Feliciano Oneil

## 2021-01-09 NOTE — PLAN OF CARE
RECEIVING UNIT ED HANDOFF REVIEW    ED Nurse Handoff Report was reviewed by: Betty Shay RN on January 8, 2021 at 11:37 PM

## 2021-01-09 NOTE — PLAN OF CARE
Cognitive Concerns/ Orientation : A&Ox4   BEHAVIOR & AGGRESSION TOOL COLOR: Green  CIWA SCORE: NA   ABNL VS/O2: VSS on RA. Denies sob  MOBILITY: SBA  PAIN MANAGMENT: Denies any pain  DIET: Regular(NPO now for abdominal US)  BOWEL/BLADDER: Continent  ABNL LAB/BG: Hgb 7.7; no s/sx of bleeding noted/reported  DRAIN/DEVICES: PIV  TELEMETRY RHYTHM: NA  SKIN: Intact  TESTS/PROCEDURES: Abdominal US  D/C DAY/GOALS/PLACE: Pending  OTHER IMPORTANT INFO: Denies any dizziness. Continues to reports left Leg numbness. Hematology/Oncology consulted.

## 2021-01-09 NOTE — H&P
[facility]    History and Physical  Hospitalist       Date of Admission:  1/8/2021    Assessment & Plan   Bonny Raymundo is a 75 year old female with a history of idiopathic aplastic anemia, chronic kidney disease stage IV, DVT, and pulmonary embolism who presented to the ER with shortness of breath.  After evaluation in the ER, she was noted to have a hemoglobin of 7.7 and elevated LFTs.  She was transfused with 1 unit of PRBCs and the hospitalist service was contacted to admit her for further evaluation and management.    Acute on chronic anemia  Idiopathic aplastic anemia  Pancytopenia    Baseline hemoglobin is in the 8-9 range.    No signs/symptoms of bleeding.    Hemoglobin down to 7.7 today.  WBC 3.9.  Platelets 119.    Hematology notes recommend transfusion to keep hemoglobin greater than 8 and platelets greater than 30.    1 unit PRBCs ordered in the ER.    Recheck labs in a.m.    Followed by Dr. Rogel at the  as an outpatient.  Will ask hematology to see her in the hospital.    Continue PTA cyclosporine.    Hold PTA Promacta pending hematology evaluation.    Shortness of breath    Not hypoxic or tachypneic.  No respiratory distress.    Chest x-ray shows no acute cardiopulmonary disease.    Possibly related to worsening anemia, will reevaluate after PRBC transfusion.    D-dimer elevated, however lab stated this was possibly due to significantly elevated pigment in her blood.  Suspicion for PE is low, but could consider further work-up if symptoms do not improve after PRBC transfusion.    Transthoracic echocardiogram on 12/8/2020 showed mild to moderate aortic regurgitation but was otherwise fairly unremarkable.    Elevated LFTs    Total bilirubin 6.2, alk phos 131, , .    Etiology unclear, could be related to her medication for aplastic anemia.    No abdominal pain or tenderness.  No nausea, vomiting, or other GI symptoms.    Will get a right upper quadrant  ultrasound.    Recheck labs in a.m.    Will ask hematology to see her as noted above, wonder if her medication for aplastic anemia may be contributing.    Chronic kidney disease, stage IV    Baseline creatinine appears to be between 1.5 and 2.0.    Creatinine stable at 1.83 in the ER.    Recheck in a.m.    History of DVT and pulmonary embolism    Occurred in 2012.    Not currently on any anticoagulation.    D-dimer was elevated in the ER, however labs states this is likely abnormal due to significant pigment in her blood.    Current suspicion for pulmonary embolism is low.  Given elevated creatinine, cannot pursue CT scanning at this time.  Will treat other acute issues as noted above and reevaluate.    DVT Prophylaxis: Pneumatic Compression Devices  Code Status: Full Code  Expected discharge: Admit to inpatient. I expect that she will be in the hospital for at least two midnights.    Rick Barahona MD    Primary Care Physician   Shivani Phelps    Chief Complaint   Shortness of breath    History is obtained from the patient    History of Present Illness   Bonny Raymundo is a 75 year old female with a history of idiopathic aplastic anemia, chronic kidney disease stage IV, DVT, and pulmonary embolism who presented to the ER with shortness of breath.  She has not been feeling well for several months.  She has been short of breath, especially with exertion.  She is also had some numbness in her legs.  The symptoms are slowly progressing.  She has been seen by her provider but no specific etiology has been determined.  She had an echocardiogram in late November which revealed mild to moderate aortic regurgitation but was otherwise fairly unremarkable.  She denies any fevers, chills, or sweats.  No chest pain or cough.  No change in her weight or lower extremity edema.  No abdominal pain, nausea, vomiting, diarrhea, or constipation.  No urinary symptoms.  She does have some numbness in her legs but states this is  been going on for several months and she has been seen by a neurologist without any obvious etiology being determined other than a possible vitamin B12 deficiency.  Due to the ongoing symptoms, she came into the ER today for evaluation.    In the ER, she was evaluated by Dr. Phillips.  Vitals are fairly unremarkable.  She is afebrile.  O2 sats were in the mid 90s on room air.  Labs revealed a hemoglobin of 7.7 and elevated LFTs among other findings as noted below.  Chest x-ray showed no acute cardiopulmonary disease.  EKG showed sinus rhythm with no acute ischemic changes.  She was given a unit of packed red blood cells.  The hospitalist service was contacted to admit her for further evaluation and management.    Past Medical History    I have reviewed this patient's medical history and updated it with pertinent information if needed.   Past Medical History:   Diagnosis Date     Allergic rhinitis due to other allergen      Aplastic anemia (H) 1/9/2017     Closed anterior dislocation of humerus      DVT of lower extremity (deep venous thrombosis) (H) 10-12    Left after prolonged sitting-plane     DVT, recurrent, lower extremity, acute (H) 2014     Headache(784.0)      Osteoporosis, unspecified      Pulmonary emboli (H) 10-12     Sensorineural hearing loss, unspecified      Sprain of ankle, unspecified site     L     Past Surgical History   I have reviewed this patient's surgical history and updated it with pertinent information if needed.  Past Surgical History:   Procedure Laterality Date     ARTHRODESIS FOOT  7-18-11    Hallux valgus R-1st MP joint     BLEPHAROPLASTY BILATERAL  2012, 2014     BONE MARROW BIOPSY, BONE SPECIMEN, NEEDLE/TROCAR N/A 9/26/2016    Procedure: BIOPSY BONE MARROW;  Surgeon: Mu Morgan MD;  Location: [facility]     BONE MARROW BIOPSY, BONE SPECIMEN, NEEDLE/TROCAR N/A 11/21/2016    Procedure: BIOPSY BONE MARROW;  Surgeon: Mu Morgan MD;  Location: [facility]     C DEXA  INTERPRETATION, AXIAL  03     CATARACT IOL, RT/LT Right      CATARACT IOL, RT/LT Left      COLONOSCOPY N/A 2018    Procedure: COLONOSCOPY;  colonoscopy;  Surgeon: Meliton Castrejon MD;  Location:  GI     EXCHANGE INTRAOCULAR LENS IMPLANT Right 2015    Procedure: EXCHANGE INTRAOCULAR LENS IMPLANT;  Surgeon: Garrett Dawson MD;  Location: [facility]     HC COLONOSCOPY THRU STOMA, DIAGNOSTIC      normal- minimal diverticulosis     PICC INSERTION Left 2017    5fr DL BioFlo PICC, 42cm (2cm external) in the L basilic vein w/ tip in the  SVC RA junction.     VITRECTOMY PARSPLANA WITH 23 GAUGE SYSTEM Right 2015    Procedure: VITRECTOMY PARSPLANA WITH 23 GAUGE SYSTEM;  Surgeon: Racheal Loyd MD;  Location: [facility]     ZZC NONSPECIFIC PROCEDURE           ZZC NONSPECIFIC PROCEDURE      hysterectomy/BSO     ZZC NONSPECIFIC PROCEDURE      myomectomy (fibroids)     Prior to Admission Medications   Prior to Admission Medications   Prescriptions Last Dose Informant Patient Reported? Taking?   COMPRESSION STOCKINGS  Self No No   Sig: Wear compression stockings at 20-30 mmHg rating most time during the day to the affected leg (left leg) or both legs. Take them off at night.   Calcium Carb-Cholecalciferol (CALCIUM + D3) 600-200 MG-UNIT TABS   Yes No   Sig: Take 1 tablet by mouth 2 times daily   ORDER FOR DME   No No   Sig: Equipment being ordered: knee high compression stockings- 18-20 mm   acetaminophen (TYLENOL) 325 MG tablet   Yes No   Sig: Take 1 tablet (325 mg) by mouth every 6 hours as needed for mild pain or fever maximum 2000 mg (2 grams) per day   cycloSPORINE modified (GENERIC EQUIVALENT) 25 MG capsule   Yes No   Sig: Take 1 capsule (25 mg) by mouth 2 times daily or as directed   diphenhydrAMINE (BENADRYL ALLERGY) 25 MG tablet   Yes No   Sig: Take 25 mg by mouth At Bedtime    eltrombopag (PROMACTA) 50 MG tablet   No No   Sig: Take 3 tablets (150 mg) by mouth daily  Administer on an empty stomach, 1 hour before or 2 hours after a meal. Or as directed   pantoprazole (PROTONIX) 20 MG EC tablet   No No   Sig: Take 1 tablet (20 mg) by mouth daily      Facility-Administered Medications: None     Allergies   Allergies   Allergen Reactions     Cats      Dogs      Seasonal Allergies      Social History   I have reviewed this patient's social history and updated it with pertinent information if needed. Bonny Raymundo  reports that she quit smoking about 47 years ago. She has never used smokeless tobacco. She reports that she does not drink alcohol or use drugs.    Family History   I have reviewed this patient's family history and updated it with pertinent information if needed.   Family History   Problem Relation Age of Onset     Musculoskeletal Disorder Mother         MS     Heart Disease Father      Heart Disease Brother         afib     Review of Systems   The 10 point Review of Systems is negative other than noted in the HPI or here.    Physical Exam   Temp: 97.5  F (36.4  C) Temp src: Oral BP: 123/82 Pulse: 97   Resp: 18 SpO2: 95 %      Vital Signs with Ranges  Temp:  [97.5  F (36.4  C)] 97.5  F (36.4  C)  Pulse:  [] 97  Resp:  [13-20] 18  BP: ()/(49-82) 123/82  SpO2:  [95 %-97 %] 95 %  125 lbs 0 oz    Constitutional: awake, alert, cooperative, no apparent distress  Eyes: sclera clear, conjunctiva normal  ENT: oral pharynx with moist mucous membranes  Respiratory: clear to auscultation bilaterally, no crackles or wheezing  Cardiovascular: regular rate and rhythm, normal S1 and S2, no murmur noted  GI: normal bowel sounds, soft, non-distended, non-tender  Skin: warm, dry  Musculoskeletal: no lower extremity pitting edema present  Neurologic: awake, alert, oriented to name, place and time, motor is 5 out of 5 bilaterally, sensory is intact    Data   Data reviewed today:  I personally reviewed the EKG tracing showing Sinus rhythm with no acute ischemic changes and the  chest x-ray image(s) showing No acute cardiopulmonary disease.    Recent Labs   Lab 01/08/21 2000   WBC 3.9*   HGB 7.7*   MCV 89   *      POTASSIUM 3.9   CHLORIDE 103   CO2 23   BUN 32*   CR 1.83*   ANIONGAP 9   LEO 8.1*   *   ALBUMIN 2.7*   PROTTOTAL 7.1   BILITOTAL 6.2*   ALKPHOS 131   *   *   TROPI <0.015     Recent Results (from the past 24 hour(s))   XR Chest Port 1 View    Narrative    XR PORTABLE CHEST ONE VIEW   1/8/2021 9:00 PM     HISTORY: Short of breath.    COMPARISON: Chest x-ray 11/24/2020.      Impression    IMPRESSION: Portable chest. Lungs are clear. Heart is normal in size.  No pneumothorax. No definite pleural effusions.    SALVADOR STEIN MD

## 2021-01-10 VITALS
OXYGEN SATURATION: 93 % | SYSTOLIC BLOOD PRESSURE: 139 MMHG | WEIGHT: 125 LBS | BODY MASS INDEX: 24.54 KG/M2 | HEIGHT: 60 IN | HEART RATE: 94 BPM | RESPIRATION RATE: 16 BRPM | TEMPERATURE: 98.2 F | DIASTOLIC BLOOD PRESSURE: 92 MMHG

## 2021-01-10 LAB
ABO + RH BLD: NORMAL
ABO + RH BLD: NORMAL
ALBUMIN SERPL-MCNC: 2.2 G/DL (ref 3.4–5)
ALP SERPL-CCNC: 108 U/L (ref 40–150)
ALT SERPL W P-5'-P-CCNC: 147 U/L (ref 0–50)
ANION GAP SERPL CALCULATED.3IONS-SCNC: 7 MMOL/L (ref 3–14)
AST SERPL W P-5'-P-CCNC: 77 U/L (ref 0–45)
BILIRUB DIRECT SERPL-MCNC: 1.6 MG/DL (ref 0–0.2)
BILIRUB SERPL-MCNC: 4.2 MG/DL (ref 0.2–1.3)
BLD GP AB SCN SERPL QL: NORMAL
BLD PROD TYP BPU: NORMAL
BLOOD BANK CMNT PATIENT-IMP: NORMAL
BUN SERPL-MCNC: 31 MG/DL (ref 7–30)
CALCIUM SERPL-MCNC: 8.2 MG/DL (ref 8.5–10.1)
CHLORIDE SERPL-SCNC: 109 MMOL/L (ref 94–109)
CO2 SERPL-SCNC: 24 MMOL/L (ref 20–32)
CREAT SERPL-MCNC: 1.61 MG/DL (ref 0.52–1.04)
ERYTHROCYTE [DISTWIDTH] IN BLOOD BY AUTOMATED COUNT: 24.9 % (ref 10–15)
GFR SERPL CREATININE-BSD FRML MDRD: 31 ML/MIN/{1.73_M2}
GLUCOSE SERPL-MCNC: 117 MG/DL (ref 70–99)
HCT VFR BLD AUTO: 25.4 % (ref 35–47)
HGB BLD-MCNC: 8.1 G/DL (ref 11.7–15.7)
INTERPRETATION ECG - MUSE: NORMAL
MCH RBC QN AUTO: 28.1 PG (ref 26.5–33)
MCHC RBC AUTO-ENTMCNC: 31.9 G/DL (ref 31.5–36.5)
MCV RBC AUTO: 88 FL (ref 78–100)
NUM BPU REQUESTED: 2
PLATELET # BLD AUTO: 106 10E9/L (ref 150–450)
POTASSIUM SERPL-SCNC: 4.4 MMOL/L (ref 3.4–5.3)
PROT SERPL-MCNC: 6.2 G/DL (ref 6.8–8.8)
RBC # BLD AUTO: 2.88 10E12/L (ref 3.8–5.2)
SODIUM SERPL-SCNC: 140 MMOL/L (ref 133–144)
SPECIMEN EXP DATE BLD: NORMAL
WBC # BLD AUTO: 4.7 10E9/L (ref 4–11)

## 2021-01-10 PROCEDURE — 250N000013 HC RX MED GY IP 250 OP 250 PS 637: Performed by: HOSPITALIST

## 2021-01-10 PROCEDURE — 85027 COMPLETE CBC AUTOMATED: CPT | Performed by: INTERNAL MEDICINE

## 2021-01-10 PROCEDURE — 36415 COLL VENOUS BLD VENIPUNCTURE: CPT | Performed by: INTERNAL MEDICINE

## 2021-01-10 PROCEDURE — 80076 HEPATIC FUNCTION PANEL: CPT | Performed by: INTERNAL MEDICINE

## 2021-01-10 PROCEDURE — 250N000012 HC RX MED GY IP 250 OP 636 PS 637: Performed by: HOSPITALIST

## 2021-01-10 PROCEDURE — 99233 SBSQ HOSP IP/OBS HIGH 50: CPT | Performed by: INTERNAL MEDICINE

## 2021-01-10 PROCEDURE — 80048 BASIC METABOLIC PNL TOTAL CA: CPT | Performed by: INTERNAL MEDICINE

## 2021-01-10 PROCEDURE — 99238 HOSP IP/OBS DSCHRG MGMT 30/<: CPT | Performed by: INTERNAL MEDICINE

## 2021-01-10 RX ADMIN — CYCLOSPORINE 25 MG: 25 CAPSULE, LIQUID FILLED ORAL at 09:21

## 2021-01-10 RX ADMIN — PANTOPRAZOLE SODIUM 20 MG: 20 TABLET, DELAYED RELEASE ORAL at 09:21

## 2021-01-10 ASSESSMENT — ACTIVITIES OF DAILY LIVING (ADL)
ADLS_ACUITY_SCORE: 14

## 2021-01-10 NOTE — PLAN OF CARE
Summary:     DATE & TIME: 1/9/21 7974-5716  Cognitive Concerns/ Orientation : A&Ox4   BEHAVIOR & AGGRESSION TOOL COLOR: Green  CIWA SCORE: NA   ABNL VS/O2: VSS on RA.   MOBILITY: Indep in room/steady  PAIN MANAGMENT: Denies  DIET: Regular- good appetite.   BOWEL/BLADDER: Continent  ABNL LAB/BG: Hgb 8.3, Cr 1.61, ,   DRAIN/DEVICES: PIV SL  TELEMETRY RHYTHM: NA  SKIN: Intact  TESTS/PROCEDURES: Abdominal U/S (-).   D/C DAY/GOALS/PLACE: Pending 1-2 days  OTHER IMPORTANT INFO: Hem/Onc saw today. Some minor OCASIO. L leg numbness present.

## 2021-01-10 NOTE — PLAN OF CARE
Cognitive Concerns/ Orientation : A&Ox4   BEHAVIOR & AGGRESSION TOOL COLOR: Green  CIWA SCORE: NA   ABNL VS/O2: VSS on RA.   MOBILITY: Independent  PAIN MANAGMENT: Denies any pain  DIET: Regular- good appetite.   BOWEL/BLADDER: Continent  ABNL LAB/BG: Hgb 8.3, Cr 1.61, ,   DRAIN/DEVICES: PIV SL  TELEMETRY RHYTHM: NA  SKIN: Intact  TESTS/PROCEDURES: Abdominal U/S (-).   D/C DAY/GOALS/PLACE: Pending 1-2 days  OTHER IMPORTANT INFO: Hem/Onc following. Reports mild sob with ambulation. L leg numbness present.

## 2021-01-10 NOTE — PROGRESS NOTES
Heme       Patient seen and examined     Labs reviewed     Her LFTs are improving off tylenol and Promacta     Bili now 4.2   AST 77        Hb stable 8.1     I discussed with her we can reintroduce Promacta at 25 mg reduction in dose for total dose 125mgs   Continue to hold tylenol   She could restart Promacta today or tomorrow depending on discharge     [facility] appt at [facility] this [facility]     O/E VSS   Not SOB   Starting to walk around   Chest clear   Heart normal   Jaundiced   GI abd soft non tender   Legs neg   Alert   Psych  Anxious     CBC  WCC 4.7   Hb 8.1  Plts 102         IMP     Aplastic anemia   Elevated LFTs   Improving     Discussed plan with patient     Call if any aislinn Oneil     35 mins

## 2021-01-10 NOTE — DISCHARGE SUMMARY
[facility]  Discharge Summary        Bonny Raymundo MRN# [id number]   YOB: 1945 Age: 75 year old     Date of Admission:  1/8/2021  Date of Discharge:  1/10/2021  Admitting Physician:  Rick Barahona MD  Discharge Physician: Fili Momin MD  Discharging Service: Hospitalist     Primary Provider:  Shivani Phelps  Primary Care Physician Phone Number: [phone]         Discharge Diagnoses/Problem Oriented Hospital Course (Providers):    Bonny Raymundo was admitted on 1/8/2021 by Rick Barahona MD and I would refer you to their history and physical.  The following problems were addressed during her hospitalization:    Bonny Raymundo is a 75 year old female with a history of idiopathic aplastic anemia, chronic kidney disease stage IV, DVT, and pulmonary embolism who presented to the ER with shortness of breath.  After evaluation in the ER, she was noted to have a hemoglobin of 7.7 and elevated LFTs.  She was transfused with 1 unit of PRBCs and the hospitalist service was contacted to admit her for further evaluation and management.     Acute on chronic anemia  Idiopathic aplastic anemia  Pancytopenia    Baseline hemoglobin is in the 8-9 range.    No signs/symptoms of bleeding.    Hemoglobin down to 7.7 improved to 8.3.      Hematology notes recommend transfusion to keep hemoglobin greater than 8 and platelets greater than 30.    1 unit PRBCs ordered in the ER.7.7 > 8.3  > 8.1    Followed by Dr. Rogel at the  as an outpatient.  Appreciate hem/onc consult    Continue PTA cyclosporine.    Resume Promacta but decrease dose from 150 to 125 mg    Continue to hold tylenol     Shortness of breath - suspect from anemia    Not hypoxic or tachypneic.  No respiratory distress.    Chest x-ray shows no acute cardiopulmonary disease.    Possibly related to worsening anemia, will reevaluate after PRBC transfusion.    D-dimer elevated, however lab stated this was  possibly due to significantly elevated pigment in her blood.  Suspicion for PE is low, but could consider further work-up if symptoms do not improve after PRBC transfusion.    Transthoracic echocardiogram on 12/8/2020 showed mild to moderate aortic regurgitation but was otherwise fairly unremarkable.     Elevated LFTs    Total bilirubin 6.2, alk phos 131, , .    Etiology unclear, could be related to her medication for aplastic anemia.    No abdominal pain or tenderness.  No nausea, vomiting, or other GI symptoms.    right upper quadrant ultrasound is normal    LFT's improved     Chronic kidney disease, stage IV    Baseline creatinine appears to be between 1.5 and 2.0.    Creatinine stable at 1.83 in the ER.    Recheck 1.61     History of DVT and pulmonary embolism    Occurred in 2012.    Not currently on any anticoagulation.    D-dimer was elevated in the ER, however labs states this is likely abnormal due to significant pigment in her blood.    Current suspicion for pulmonary embolism is low.  Given elevated creatinine, cannot pursue CT scanning at this time.  Will treat other acute issues as noted above and reevaluate.         Code Status:      Full Code         Important Results:      NAD  HEENT NORMAL  PULM CTA  CV RRR  GI SOFT +BS  MS NO EDEMA  NEURO NON FOCAL         Pending Results:        Unresulted Labs Ordered in the Past 30 Days of this Admission     Date and Time Order Name Status Description    1/9/2021 0012 Haptoglobin In process                Discharge Instructions and Follow-Up:      Follow-up Appointments     Follow-up and recommended labs and tests       Follow up with Dr Rogel in 2-3 weeks with repeat LFT's    Stop tylenol                  Discharge Disposition:      Discharged to home         Discharge Medications:        Current Discharge Medication List      CONTINUE these medications which have CHANGED    Details   eltrombopag (PROMACTA) 25 MG tablet Take 5 tablets (125 mg)  by mouth At Bedtime Administer on an empty stomach, 1 hour before or 2 hours after a meal.  Qty: 150 tablet, Refills: 0    Associated Diagnoses: Aplastic anemia (H)         CONTINUE these medications which have NOT CHANGED    Details   Cyanocobalamin (VITAMIN B-12 PO) Take 1 tablet by mouth daily      cycloSPORINE modified (GENERIC EQUIVALENT) 25 MG capsule Take 25 mg by mouth 2 times daily   Qty: 540 capsule, Refills: 2    Associated Diagnoses: Aplastic anemia (H); Pancytopenia (H); Idiopathic aplastic anemia (H)      diphenhydrAMINE (BENADRYL ALLERGY) 25 MG tablet Take 25 mg by mouth At Bedtime   Qty: 56 tablet      pantoprazole (PROTONIX) 20 MG EC tablet Take 1 tablet (20 mg) by mouth daily  Qty: 90 tablet, Refills: 11    Associated Diagnoses: Gastroesophageal reflux disease without esophagitis      COMPRESSION STOCKINGS Wear compression stockings at 20-30 mmHg rating most time during the day to the affected leg (left leg) or both legs. Take them off at night.  Qty: 2 each, Refills: 2    Associated Diagnoses: DVT (deep venous thrombosis), left; Postphlebitic syndrome      ORDER FOR DME Equipment being ordered: knee high compression stockings- 18-20 mm  Qty: 1 Box, Refills: 1    Associated Diagnoses: DVT prophylaxis         STOP taking these medications       acetaminophen (TYLENOL) 325 MG tablet Comments:   Reason for Stopping:                    Allergies:         Allergies   Allergen Reactions     Cats      Dogs      Seasonal Allergies             Consultations This Hospital Stay:      Consultation during this admission received from hematology          Discharge Orders for Skilled Facility (from Discharge Orders):        After Care Instructions     Activity      Your activity upon discharge: activity as tolerated         Diet      Follow this diet upon discharge: Orders Placed This Encounter      Combination Diet Regular Diet Adult                      Discharge Time:      Less than 30 minutes.        Image  Results From This Hospital Stay (For Non-EPIC Providers):        Results for orders placed or performed during the hospital encounter of 01/08/21   XR Chest Port 1 View    Narrative    XR PORTABLE CHEST ONE VIEW   1/8/2021 9:00 PM     HISTORY: Short of breath.    COMPARISON: Chest x-ray 11/24/2020.      Impression    IMPRESSION: Portable chest. Lungs are clear. Heart is normal in size.  No pneumothorax. No definite pleural effusions.    SALVADOR STEIN MD   US Abdomen Limited    Narrative    ULTRASOUND ABDOMEN LIMITED 1/9/2021 9:28 AM    CLINICAL HISTORY: Elevated LFTs.    TECHNIQUE: Limited abdominal ultrasound.    COMPARISON: None.    FINDINGS:  GALLBLADDER: The gallbladder is normal. No gallstones, wall  thickening, or pericholecystic fluid. Negative sonographic Oneil's  sign.    BILE DUCTS: Mild biliary dilatation, possibly related to age. The  common duct measures 8 mm.    LIVER: Unremarkable echogenicity without focal lesions.    RIGHT KIDNEY: No hydronephrosis.    PANCREAS: The visualized portions of the pancreas are normal.    No ascites.      Impression    IMPRESSION:  Normal limited abdominal ultrasound.    ROSEANN MASSEY MD           Most Recent Lab Results In EPIC (For Non-EPIC Providers):    Most Recent 3 CBC's:  Recent Labs   Lab Test 01/10/21  1106 01/09/21  1442 01/08/21 2000   WBC 4.7 3.7* 3.9*   HGB 8.1* 8.3* 7.7*   MCV 88 86 89   * 102* 119*      Most Recent 3 BMP's:  Recent Labs   Lab Test 01/10/21  1106 01/09/21  1442 01/08/21 2000    139 135   POTASSIUM 4.4 4.4 3.9   CHLORIDE 109 107 103   CO2 24 25 23   BUN 31* 28 32*   CR 1.61* 1.61* 1.83*   ANIONGAP 7 7 9   LEO 8.2* 8.1* 8.1*   * 107* 122*     Most Recent 3 Troponin's:  Recent Labs   Lab Test 01/08/21  2000 09/10/20  0918 09/08/16  0914   TROPI <0.015 <0.015 <0.015  The 99th percentile for upper reference range is 0.045 ug/L.  Troponin values in   the range of 0.045 - 0.120 ug/L may be associated with risks of adverse    clinical events.       Most Recent 3 INR's:  Recent Labs   Lab Test 09/10/20  0918 01/10/18  1013 03/15/17  1030   INR 1.38* 1.01 0.95     Most Recent 2 LFT's:  Recent Labs   Lab Test 01/10/21  1106 01/09/21  1442   AST 77* 116*   * 175*   ALKPHOS 108 109   BILITOTAL 4.2* 5.3*     Most Recent Cholesterol Panel:  Recent Labs   Lab Test 04/27/18  0957   CHOL 180   *   HDL 30*   TRIG 156*     Most Recent 6 Bacteria Isolates From Any Culture (See EPIC Reports for Culture Details):  Recent Labs   Lab Test 01/24/20  1242 08/27/19  1155 10/08/18  1329 01/09/17  2134 01/09/17  2129 10/19/16  1403   CULT >100,000 colonies/mL  mixed urogenital baldemar  Susceptibility testing not routinely done   >100,000 colonies/mL  Escherichia coli  *  >100,000 colonies/mL  Klebsiella pneumoniae  * >100,000 colonies/mL  Klebsiella pneumoniae  *  >100,000 colonies/mL  Strain 2  Klebsiella pneumoniae  * No growth No growth Moderate growth Normal baldemar  No Beta Streptococcus isolated       Most Recent TSH, T4 and HgbA1c:  Recent Labs   Lab Test 04/30/18  1250   TSH 1.08

## 2021-01-10 NOTE — PROGRESS NOTES
Discharge    Patient discharged to HOME via private transportation with son.   Care plan note: complete.     Listed belongings gathered and returned to patient. Yes  Care Plan and Patient education resolved: Yes  Prescriptions if needed, hard copies sent with patient  Yes  Home and hospital acquired medications returned to patient: Yes  Medication Bin checked and emptied on discharge Yes  Follow up appointment made for patient: Yes     Summary:     DATE & TIME: 1/9/21 DAY   Cognitive Concerns/ Orientation : A&Ox4   BEHAVIOR & AGGRESSION TOOL COLOR: Green  CIWA SCORE: NA   ABNL VS/O2: VSS on RA.   MOBILITY: Independent  PAIN MANAGMENT: Denies any pain  DIET: Regular- good appetite.   BOWEL/BLADDER: Continent  ABNL LAB/BG: Hgb 8.1, alk phos 4.2, , AST 77 Hgb 8.1  DRAIN/DEVICES: PIV SL  TELEMETRY RHYTHM: NA  SKIN: Intact  TESTS/PROCEDURES: Abdominal U/S (-).   D/C DAY/GOALS/PLACE: TODAY- pt will follow up with LFT's  OTHER IMPORTANT INFO: Hem/Onc following. Reports mild sob with ambulation. L leg numbness present.

## 2021-01-11 ENCOUNTER — PATIENT OUTREACH (OUTPATIENT)
Dept: ONCOLOGY | Facility: CLINIC | Age: 76
End: 2021-01-11

## 2021-01-11 ENCOUNTER — TELEPHONE (OUTPATIENT)
Dept: FAMILY MEDICINE | Facility: CLINIC | Age: 76
End: 2021-01-11

## 2021-01-11 LAB
ACANTHOCYTES BLD QL SMEAR: ABNORMAL
ANISOCYTOSIS BLD QL SMEAR: ABNORMAL
BASOPHILS # BLD AUTO: 0 10E9/L (ref 0–0.2)
BASOPHILS NFR BLD AUTO: 0 %
BURR CELLS BLD QL SMEAR: SLIGHT
DIFFERENTIAL METHOD BLD: ABNORMAL
EOSINOPHIL # BLD AUTO: 0.1 10E9/L (ref 0–0.7)
EOSINOPHIL NFR BLD AUTO: 3 %
ERYTHROCYTE [DISTWIDTH] IN BLOOD BY AUTOMATED COUNT: 27.6 % (ref 10–15)
HAPTOGLOB SERPL-MCNC: <3 MG/DL (ref 32–197)
HCT VFR BLD AUTO: 24.7 % (ref 35–47)
HGB BLD-MCNC: 7.7 G/DL (ref 11.7–15.7)
LYMPHOCYTES # BLD AUTO: 1.3 10E9/L (ref 0.8–5.3)
LYMPHOCYTES NFR BLD AUTO: 34 %
MACROCYTES BLD QL SMEAR: PRESENT
MCH RBC QN AUTO: 27.6 PG (ref 26.5–33)
MCHC RBC AUTO-ENTMCNC: 31.2 G/DL (ref 31.5–36.5)
MCV RBC AUTO: 89 FL (ref 78–100)
MONOCYTES # BLD AUTO: 0.4 10E9/L (ref 0–1.3)
MONOCYTES NFR BLD AUTO: 9 %
NEUTROPHILS # BLD AUTO: 2 10E9/L (ref 1.6–8.3)
NEUTROPHILS NFR BLD AUTO: 51 %
NRBC # BLD AUTO: 3.1 10*3/UL
NRBC BLD AUTO-RTO: 80 /100
PLATELET # BLD AUTO: 119 10E9/L (ref 150–450)
PLATELET # BLD EST: ABNORMAL 10*3/UL
RBC # BLD AUTO: 2.79 10E12/L (ref 3.8–5.2)
RBC INCLUSIONS BLD: SLIGHT
SPHEROCYTES BLD QL SMEAR: ABNORMAL
TARGETS BLD QL SMEAR: SLIGHT
WBC # BLD AUTO: 3.9 10E9/L (ref 4–11)

## 2021-01-11 NOTE — TELEPHONE ENCOUNTER
Chief Complaint: Dyspnea, Unspecified Type, SUN 10-GRAZYNA-2021, 1 / 1    Pt ph: [phone] (home)

## 2021-01-11 NOTE — TELEPHONE ENCOUNTER
"  ED / Discharge Outreach Protocol    Patient Contact    Attempt # 1    Was call answered?  Yes.  \"May I please speak with <Bonny>\"  Is patient available?   Yes    ED/Discharge Protocol    \"Hi, my name is Lana Arambula RN, a registered nurse, and I am calling on behalf of Shivani Phelps's office at [place].  I am calling to follow up and see how things are going for you after your recent visit.\"    \"I see that you were in the (ER/UC/IP) on 1/8-1/10.    How are you doing now that you are home?\" doing okay - labs and tele conference     Is patient experiencing symptoms that may require a hospital visit?  No     Discharge Instructions    \"Let's review your discharge instructions.  What is/are the follow-up recommendations?  Pt. Response: Follow-up Appointments     Follow-up and recommended labs and tests       Follow up with Dr Rogel in 2-3 weeks with repeat LFT's     Stop tylenol       \"Were you instructed to make a follow-up appointment?\"  Pt. Response: No.       \"When you see the provider, I would recommend that you bring your discharge instructions with you.    Medications    \"How many new medications are you on since your hospitalization/ED visit?\"    0-1  \"How many of your current medicines changed (dose, timing, name, etc.) while you were in the hospital/ED visit?\"   0-1  \"Do you have questions about your medications?\"   No  \"Were you newly diagnosed with heart failure, COPD, diabetes or did you have a heart attack?\"   No  For patients on insulin: \"Did you start on insulin in the hospital or did you have your insulin dose changed?\"   No  Post Discharge Medication Reconciliation Status: discharge medications reconciled, continue medications without change.    Was MTM referral placed (*Make sure to put transitions as reason for referral)?   No    Call Summary    \"Do you have any questions or concerns about your condition or care plan at the moment?\"    No  Triage nurse advice given:    Patient was in ER 2x in the " "past year (assess appropriateness of ER visits.)      \"If you have questions or things don't continue to improve, we encourage you contact us through the main clinic number,  ([phone])  .  Even if the clinic is not open, triage nurses are available 24/7 to help you.     We would like you to know that our clinic has extended hours (provide information).  We also have urgent care (provide details on closest location and hours/contact info)\"      \"Thank you for your time and take care!\"      Lana BALTAZAR RN    "

## 2021-01-12 ENCOUNTER — OFFICE VISIT (OUTPATIENT)
Dept: INFUSION THERAPY | Facility: CLINIC | Age: 76
End: 2021-01-12
Attending: INTERNAL MEDICINE
Payer: COMMERCIAL

## 2021-01-12 ENCOUNTER — HOSPITAL ENCOUNTER (OUTPATIENT)
Facility: CLINIC | Age: 76
Setting detail: SPECIMEN
Discharge: HOME | End: 2021-01-12
Attending: INTERNAL MEDICINE | Admitting: PHYSICIAN ASSISTANT
Payer: COMMERCIAL

## 2021-01-12 DIAGNOSIS — D61.3 IDIOPATHIC APLASTIC ANEMIA (H): ICD-10-CM

## 2021-01-12 LAB
ALBUMIN SERPL-MCNC: 2.6 G/DL (ref 3.4–5)
ALP SERPL-CCNC: 99 U/L (ref 40–150)
ALT SERPL W P-5'-P-CCNC: 106 U/L (ref 0–50)
ANION GAP SERPL CALCULATED.3IONS-SCNC: 7 MMOL/L (ref 3–14)
ANISOCYTOSIS BLD QL SMEAR: ABNORMAL
AST SERPL W P-5'-P-CCNC: 44 U/L (ref 0–45)
BASOPHILS # BLD AUTO: 0 10E9/L (ref 0–0.2)
BASOPHILS NFR BLD AUTO: 0 %
BILIRUB SERPL-MCNC: 4.4 MG/DL (ref 0.2–1.3)
BLD PROD TYP BPU: NORMAL
BLD UNIT ID BPU: 0
BLOOD PRODUCT CODE: NORMAL
BPU ID: NORMAL
BUN SERPL-MCNC: 25 MG/DL (ref 7–30)
CALCIUM SERPL-MCNC: 8.6 MG/DL (ref 8.5–10.1)
CHLORIDE SERPL-SCNC: 104 MMOL/L (ref 94–109)
CO2 SERPL-SCNC: 26 MMOL/L (ref 20–32)
CREAT SERPL-MCNC: 1.61 MG/DL (ref 0.52–1.04)
DACRYOCYTES BLD QL SMEAR: SLIGHT
DIFFERENTIAL METHOD BLD: ABNORMAL
EOSINOPHIL # BLD AUTO: 0.2 10E9/L (ref 0–0.7)
EOSINOPHIL NFR BLD AUTO: 5 %
ERYTHROCYTE [DISTWIDTH] IN BLOOD BY AUTOMATED COUNT: 24.6 % (ref 10–15)
GFR SERPL CREATININE-BSD FRML MDRD: 31 ML/MIN/{1.73_M2}
GLUCOSE SERPL-MCNC: 120 MG/DL (ref 70–99)
HCT VFR BLD AUTO: 28.6 % (ref 35–47)
HGB BLD-MCNC: 8.7 G/DL (ref 11.7–15.7)
HOWELL-JOLLY BOD BLD QL SMEAR: PRESENT
HYPOCHROMIA BLD QL: PRESENT
LYMPHOCYTES # BLD AUTO: 0.6 10E9/L (ref 0.8–5.3)
LYMPHOCYTES NFR BLD AUTO: 18 %
MCH RBC QN AUTO: 28.3 PG (ref 26.5–33)
MCHC RBC AUTO-ENTMCNC: 30.4 G/DL (ref 31.5–36.5)
MCV RBC AUTO: 93 FL (ref 78–100)
METAMYELOCYTES # BLD: 0 10E9/L
METAMYELOCYTES NFR BLD MANUAL: 1 %
MICROCYTES BLD QL SMEAR: PRESENT
MONOCYTES # BLD AUTO: 0.6 10E9/L (ref 0–1.3)
MONOCYTES NFR BLD AUTO: 18 %
NEUTROPHILS # BLD AUTO: 1.9 10E9/L (ref 1.6–8.3)
NEUTROPHILS NFR BLD AUTO: 58 %
NRBC # BLD AUTO: 2.8 10*3/UL
NRBC BLD AUTO-RTO: 89 /100
PLATELET # BLD AUTO: 112 10E9/L (ref 150–450)
PLATELET # BLD EST: ABNORMAL 10*3/UL
POTASSIUM SERPL-SCNC: 4.1 MMOL/L (ref 3.4–5.3)
PROT SERPL-MCNC: 6.9 G/DL (ref 6.8–8.8)
RBC # BLD AUTO: 3.07 10E12/L (ref 3.8–5.2)
RBC INCLUSIONS BLD: SLIGHT
SODIUM SERPL-SCNC: 137 MMOL/L (ref 133–144)
TARGETS BLD QL SMEAR: ABNORMAL
TRANSFUSION STATUS PATIENT QL: NORMAL
TRANSFUSION STATUS PATIENT QL: NORMAL
WBC # BLD AUTO: 3.2 10E9/L (ref 4–11)

## 2021-01-12 PROCEDURE — 80053 COMPREHEN METABOLIC PANEL: CPT | Performed by: PHYSICIAN ASSISTANT

## 2021-01-12 PROCEDURE — 85025 COMPLETE CBC W/AUTO DIFF WBC: CPT | Performed by: PHYSICIAN ASSISTANT

## 2021-01-12 PROCEDURE — 36415 COLL VENOUS BLD VENIPUNCTURE: CPT

## 2021-01-12 NOTE — PROGRESS NOTES
Medical Assistant Note:  Bonny Raymundo presents today for lab draw.    Patient seen by provider today: No.   present during visit today: Not Applicable.    Concerns: No Concerns.    Procedure:  Lab draw site: LAC, Needle type: Butterfly, Gauge: 23.    Post Assessment:  Labs drawn without difficulty: Yes.    Discharge Plan:  Departure Mode: Ambulatory.    Face to Face Time: 4 min.    Shari Schoenberger, CMA

## 2021-01-12 NOTE — LETTER
1/12/2021         RE: Bonny Raymundo  [address]  [facility] [address]        Dear Colleague,    Thank you for referring your patient, Bonny Raymundo, to the [facility]. Please see a copy of my visit note below.    Medical Assistant Note:  Bonny Raymundo presents today for lab draw.    Patient seen by provider today: No.   present during visit today: Not Applicable.    Concerns: No Concerns.    Procedure:  Lab draw site: LAC, Needle type: Butterfly, Gauge: 23.    Post Assessment:  Labs drawn without difficulty: Yes.    Discharge Plan:  Departure Mode: Ambulatory.    Face to Face Time: 4 min.    Shari Schoenberger, CMA      Again, thank you for allowing me to participate in the care of your patient.        Sincerely,         Lab Draw

## 2021-01-13 ENCOUNTER — TELEPHONE (OUTPATIENT)
Dept: ONCOLOGY | Facility: CLINIC | Age: 76
End: 2021-01-13

## 2021-01-13 NOTE — TELEPHONE ENCOUNTER
Writer called and left voicemail requesting call back to discuss [employer] re-enrollment for Promacta.    [facility]   Oncology Pharmacy Liaison  [email or web address]   [phone] (phone)   [phone] (fax)

## 2021-01-14 ENCOUNTER — VIRTUAL VISIT (OUTPATIENT)
Dept: TRANSPLANT | Facility: CLINIC | Age: 76
End: 2021-01-14
Attending: INTERNAL MEDICINE
Payer: COMMERCIAL

## 2021-01-14 DIAGNOSIS — N18.4 CKD (CHRONIC KIDNEY DISEASE) STAGE 4, GFR 15-29 ML/MIN (H): ICD-10-CM

## 2021-01-14 DIAGNOSIS — D61.9 APLASTIC ANEMIA (H): ICD-10-CM

## 2021-01-14 PROCEDURE — 99442 PR PHYSICIAN TELEPHONE EVALUATION 11-20 MIN: CPT | Performed by: INTERNAL MEDICINE

## 2021-01-14 PROCEDURE — 999N001193 HC VIDEO/TELEPHONE VISIT; NO CHARGE

## 2021-01-14 NOTE — LETTER
1/14/2021         RE: Bonny Raymundo  [address]  [facility] [address]        Dear Colleague,    Thank you for referring your patient, Bonny Raymundo, to the [facility] [place]. Please see a copy of my visit note below.    Bonny is a 75 year old who is being evaluated via a billable telephone visit.      What phone number would you like to be contacted at? [phone]  How would you like to obtain your AVS? Mail a copy   Vitals - Patient Reported  Weight (Patient Reported): 56.7 kg (125 lb)  Height (Patient Reported): 152.4 cm (5')  BMI (Based on Pt Reported Ht/Wt): 24.41  Pain Score: No Pain (0)    Leola Knutson CMA January 14, 2021  4:14 PM     Phone call duration: 20 minutes    This is a 20-minute telephone visit with Bonny who has chronic aplastic anemia.  She was briefly hospitalized 10 days ago for fatigue and more severe anemia associated with substantially abnormal liver function.  Her liver function had been mildly abnormal earlier in the fall but her hemoglobin then fell to 7.7.  It may have been attributable to excess Tylenol which was discontinued and she was advised to reduce her eltrombopag dosing to 125 mg daily.  She was given 1 unit of blood in the hospital but no other abnormalities were identified.    Today she feels better but not fully back to baseline.  Her activity level is improving and she no longer has dyspnea on exertion or chest pain.  She is eating acceptably and has had no fever chills rash bleeding or bruising since she has been home from the hospital.  She has 2 sore slightly loose teeth, the posterior lower molars on each side so she feels like her cheeks are a bit swollen and is going to make arrangements for a dental exam.  She thinks she may need dental extractions.    The rest of her review of systems is unrevealing.    Laboratory testing from 2 days ago shows improvement in her hemoglobin to 8.7 with a stable  white count and platelet count.  Her liver functions remain abnormal but are much closer to baseline.  Her bilirubin remains elevated at 4.4, ALT twice normal at 106, AST finally normal at 44.  I reemphasized the need to avoid Tylenol or any excess medications that may need liver detoxification.  She has some trouble sleeping so I suggested Benadryl and avoid any other longer acting sleeping pills or narcotics.    If she needs a dental extraction, low-dose oxycodone would be suitable but preferably that be delayed until her liver function fully returns towards normal.    She will continue on cyclosporine 25 mg twice daily and eltrombopag 125 mg in the evening and avoid excess other medications.    We will recheck her labs in 2 weeks and I will probably see her again in 4 weeks but will talk after the laboratory results are available    Rafita Rogel MD    Professor of medicine    Results for LISSETH PARIKH (MRN [id number]) as of 1/15/2021 14:29   Ref. Range 12/8/2020 13:19 12/21/2020 17:20 12/21/2020 17:22 1/8/2021 20:00 1/8/2021 20:00 1/8/2021 20:28 1/8/2021 20:55 1/8/2021 21:00 1/8/2021 21:32 1/9/2021 09:28 1/9/2021 14:42 1/10/2021 11:06 1/12/2021 14:29   Sodium Latest Ref Range: 133 - 144 mmol/L    135       139 140 137   Potassium Latest Ref Range: 3.4 - 5.3 mmol/L    3.9       4.4 4.4 4.1   Chloride Latest Ref Range: 94 - 109 mmol/L    103       107 109 104   Carbon Dioxide Latest Ref Range: 20 - 32 mmol/L    23       25 24 26   Urea Nitrogen Latest Ref Range: 7 - 30 mg/dL    32 (H)       28 31 (H) 25   Creatinine Latest Ref Range: 0.52 - 1.04 mg/dL   1.8 (H) 1.83 (H)       1.61 (H) 1.61 (H) 1.61 (H)   GFR Estimate Latest Ref Range: >60 mL/min/1.73_m2   27 (L) 26 (L)       31 (L) 31 (L) 31 (L)   GFR Estimate If Black Latest Ref Range: >60 mL/min/1.73_m2   33 (L) 30 (L)       35 (L) 35 (L) 35 (L)   Calcium Latest Ref Range: 8.5 - 10.1 mg/dL    8.1 (L)       8.1 (L) 8.2 (L) 8.6   Anion Gap Latest Ref Range: 3  - 14 mmol/L    9       7 7 7   Albumin Latest Ref Range: 3.4 - 5.0 g/dL    2.7 (L)       2.2 (L) 2.2 (L) 2.6 (L)   Protein Total Latest Ref Range: 6.8 - 8.8 g/dL    7.1       6.1 (L) 6.2 (L) 6.9   Bilirubin Total Latest Ref Range: 0.2 - 1.3 mg/dL    6.2 (H)       5.3 (H) 4.2 (H) 4.4 (H)   Alkaline Phosphatase Latest Ref Range: 40 - 150 U/L    131       109 108 99   ALT Latest Ref Range: 0 - 50 U/L    245 (H)       175 (H) 147 (H) 106 (H)   AST Latest Ref Range: 0 - 45 U/L    196 (H)       116 (H) 77 (H) 44   Bilirubin Direct Latest Ref Range: 0.0 - 0.2 mg/dL           2.2 (H) 1.6 (H)    Lactate Dehydrogenase Latest Ref Range: 81 - 234 U/L           269 (H)     N-Terminal Pro BNP Inpatient Latest Ref Range: 0 - 1,800 pg/mL    600            Troponin I ES Latest Ref Range: 0.000 - 0.045 ug/L    <0.015            Glucose Latest Ref Range: 70 - 99 mg/dL    122 (H)       107 (H) 117 (H) 120 (H)   WBC Latest Ref Range: 4.0 - 11.0 10e9/L    3.9 (L)       3.7 (L) 4.7 3.2 (L)   Hemoglobin Latest Ref Range: 11.7 - 15.7 g/dL    7.7 (L)       8.3 (L) 8.1 (L) 8.7 (L)   Hematocrit Latest Ref Range: 35.0 - 47.0 %    24.7 (L)       25.1 (L) 25.4 (L) 28.6 (L)   Platelet Count Latest Ref Range: 150 - 450 10e9/L    119 (L)       102 (L) 106 (L) 112 (L)   RBC Count Latest Ref Range: 3.8 - 5.2 10e12/L    2.79 (L)       2.93 (L) 2.88 (L) 3.07 (L)   MCV Latest Ref Range: 78 - 100 fl    89       86 88 93   MCH Latest Ref Range: 26.5 - 33.0 pg    27.6       28.3 28.1 28.3   MCHC Latest Ref Range: 31.5 - 36.5 g/dL    31.2 (L)       33.1 31.9 30.4 (L)   RDW Latest Ref Range: 10.0 - 15.0 %    27.6 (H)       23.9 (H) 24.9 (H) 24.6 (H)   Diff Method Unknown    Manual Differential         Manual Differential   % Neutrophils Latest Units: %    51.0         58.0   % Lymphocytes Latest Units: %    34.0         18.0   % Monocytes Latest Units: %    9.0         18.0   % Eosinophils Latest Units: %    3.0         5.0   % Basophils Latest Units: %     0.0         0.0   % Metamyelocytes Latest Units: %             1.0   Nucleated RBCs Latest Ref Range: 0 /100    80 (H)         89 (H)   Absolute Neutrophil Latest Ref Range: 1.6 - 8.3 10e9/L    2.0         1.9   Absolute Lymphocytes Latest Ref Range: 0.8 - 5.3 10e9/L    1.3         0.6 (L)   Absolute Monocytes Latest Ref Range: 0.0 - 1.3 10e9/L    0.4         0.6   Absolute Eosinophils Latest Ref Range: 0.0 - 0.7 10e9/L    0.1         0.2   Absolute Basophils Latest Ref Range: 0.0 - 0.2 10e9/L    0.0         0.0   Absolute Metamyelocytes Latest Ref Range: 0 10e9/L             0.0   Absolute Nucleated RBC Unknown    3.1         2.8   Anisocytosis Unknown    Marked         Marked   Spherocytes Unknown    Slide reviewed by Pathologist            RBC Fragments Unknown    Slight         Slight   Teardrop Cells Unknown             Slight   Acanthocytes Unknown    Moderate            Target Cells Unknown    Slight         Marked   Quang Cells Unknown    Slight            Microcytes Unknown             Present   Macrocytes Unknown    Present            Lynch Winger Bodies Unknown             Present   Hypochromasia Unknown             Present   Platelet Estimate Unknown    Automated count confirmed.  Giant platelets are present.         Automated count confirmed.  Giant platelets are present.   % Retic Latest Ref Range: 0.5 - 2.0 %           0.9     Absolute Retic Latest Ref Range: 25 - 95 10e9/L           25.8     D-Dimer Latest Ref Range: 0.0 - 0.50 ug/ml FEU         2.3 (H)       ABO Unknown    A            RH(D) Unknown    Pos            Antibody Screen Unknown    Neg            Test Valid Only At Latest Units:        [facility]            Specimen Expires Unknown    01/11/2021            Blood Component Type Unknown    Red Blood Cells LeukoReduced Irradiated Red Blood Cells LeukoReduced Irradiated           Unit Number Unknown    [id number]           Division Number Unknown    00 00            Status of Unit Unknown    Released to care unit No longer available 01/12/2021 0300           Crossmatch Unknown    Red Blood Cells            Unit Status Unknown    ISS RET           Flu A/B & SARS-COV-2 PCR Source Unknown       Nasopharyngeal         SARS-CoV-2 PCR Result Unknown       NEGATIVE         Influenza A Latest Ref Range: NEG^Negative        Negative         Influenza B Latest Ref Range: NEG^Negative        Negative         Respiratory Syncytial Virus PCR Latest Ref Range: NEG^Negative        Negative         Haptoglobin Latest Ref Range: 32 - 197 mg/dL           <3 (L)     XR CHEST PORT 1 VW Unknown        Rpt        CT CHEST PULMONARY EMBOLISM W CONTRAST Unknown  Rpt              US ABDOMEN LIMITED Unknown          Rpt          Again, thank you for allowing me to participate in the care of your patient.        Sincerely,        Rafita Rogel MD

## 2021-01-14 NOTE — LETTER
1/14/2021         RE: Bonny Raymundo  [address]  [place] [facility] [address]      Bonny is a 75 year old who is being evaluated via a billable telephone visit.      What phone number would you like to be contacted at? [phone]  How would you like to obtain your AVS? Mail a copy   Vitals - Patient Reported  Weight (Patient Reported): 56.7 kg (125 lb)  Height (Patient Reported): 152.4 cm (5')  BMI (Based on Pt Reported Ht/Wt): 24.41  Pain Score: No Pain (0)    Leola Knutson CMA January 14, 2021  4:14 PM     Phone call duration: 20 minutes    This is a 20-minute telephone visit with Bonny who has chronic aplastic anemia.  She was briefly hospitalized 10 days ago for fatigue and more severe anemia associated with substantially abnormal liver function.  Her liver function had been mildly abnormal earlier in the fall but her hemoglobin then fell to 7.7.  It may have been attributable to excess Tylenol which was discontinued and she was advised to reduce her eltrombopag dosing to 125 mg daily.  She was given 1 unit of blood in the hospital but no other abnormalities were identified.    Today she feels better but not fully back to baseline.  Her activity level is improving and she no longer has dyspnea on exertion or chest pain.  She is eating acceptably and has had no fever chills rash bleeding or bruising since she has been home from the hospital.  She has 2 sore slightly loose teeth, the posterior lower molars on each side so she feels like her cheeks are a bit swollen and is going to make arrangements for a dental exam.  She thinks she may need dental extractions.    The rest of her review of systems is unrevealing.    Laboratory testing from 2 days ago shows improvement in her hemoglobin to 8.7 with a stable white count and platelet count.  Her liver functions remain abnormal but are much closer to baseline.  Her bilirubin remains elevated at 4.4, ALT twice normal at 106, AST finally normal at 44.  I  reemphasized the need to avoid Tylenol or any excess medications that may need liver detoxification.  She has some trouble sleeping so I suggested Benadryl and avoid any other longer acting sleeping pills or narcotics.    If she needs a dental extraction, low-dose oxycodone would be suitable but preferably that be delayed until her liver function fully returns towards normal.    She will continue on cyclosporine 25 mg twice daily and eltrombopag 125 mg in the evening and avoid excess other medications.    We will recheck her labs in 2 weeks and I will probably see her again in 4 weeks but will talk after the laboratory results are available    Rafita Rogel MD    Professor of medicine    Results for LISSETH PARIKH (MRN [id number]) as of 1/15/2021 14:29   Ref. Range 12/8/2020 13:19 12/21/2020 17:20 12/21/2020 17:22 1/8/2021 20:00 1/8/2021 20:00 1/8/2021 20:28 1/8/2021 20:55 1/8/2021 21:00 1/8/2021 21:32 1/9/2021 09:28 1/9/2021 14:42 1/10/2021 11:06 1/12/2021 14:29   Sodium Latest Ref Range: 133 - 144 mmol/L    135       139 140 137   Potassium Latest Ref Range: 3.4 - 5.3 mmol/L    3.9       4.4 4.4 4.1   Chloride Latest Ref Range: 94 - 109 mmol/L    103       107 109 104   Carbon Dioxide Latest Ref Range: 20 - 32 mmol/L    23       25 24 26   Urea Nitrogen Latest Ref Range: 7 - 30 mg/dL    32 (H)       28 31 (H) 25   Creatinine Latest Ref Range: 0.52 - 1.04 mg/dL   1.8 (H) 1.83 (H)       1.61 (H) 1.61 (H) 1.61 (H)   GFR Estimate Latest Ref Range: >60 mL/min/1.73_m2   27 (L) 26 (L)       31 (L) 31 (L) 31 (L)   GFR Estimate If Black Latest Ref Range: >60 mL/min/1.73_m2   33 (L) 30 (L)       35 (L) 35 (L) 35 (L)   Calcium Latest Ref Range: 8.5 - 10.1 mg/dL    8.1 (L)       8.1 (L) 8.2 (L) 8.6   Anion Gap Latest Ref Range: 3 - 14 mmol/L    9       7 7 7   Albumin Latest Ref Range: 3.4 - 5.0 g/dL    2.7 (L)       2.2 (L) 2.2 (L) 2.6 (L)   Protein Total Latest Ref Range: 6.8 - 8.8 g/dL    7.1       6.1 (L) 6.2 (L) 6.9    Bilirubin Total Latest Ref Range: 0.2 - 1.3 mg/dL    6.2 (H)       5.3 (H) 4.2 (H) 4.4 (H)   Alkaline Phosphatase Latest Ref Range: 40 - 150 U/L    131       109 108 99   ALT Latest Ref Range: 0 - 50 U/L    245 (H)       175 (H) 147 (H) 106 (H)   AST Latest Ref Range: 0 - 45 U/L    196 (H)       116 (H) 77 (H) 44   Bilirubin Direct Latest Ref Range: 0.0 - 0.2 mg/dL           2.2 (H) 1.6 (H)    Lactate Dehydrogenase Latest Ref Range: 81 - 234 U/L           269 (H)     N-Terminal Pro BNP Inpatient Latest Ref Range: 0 - 1,800 pg/mL    600            Troponin I ES Latest Ref Range: 0.000 - 0.045 ug/L    <0.015            Glucose Latest Ref Range: 70 - 99 mg/dL    122 (H)       107 (H) 117 (H) 120 (H)   WBC Latest Ref Range: 4.0 - 11.0 10e9/L    3.9 (L)       3.7 (L) 4.7 3.2 (L)   Hemoglobin Latest Ref Range: 11.7 - 15.7 g/dL    7.7 (L)       8.3 (L) 8.1 (L) 8.7 (L)   Hematocrit Latest Ref Range: 35.0 - 47.0 %    24.7 (L)       25.1 (L) 25.4 (L) 28.6 (L)   Platelet Count Latest Ref Range: 150 - 450 10e9/L    119 (L)       102 (L) 106 (L) 112 (L)   RBC Count Latest Ref Range: 3.8 - 5.2 10e12/L    2.79 (L)       2.93 (L) 2.88 (L) 3.07 (L)   MCV Latest Ref Range: 78 - 100 fl    89       86 88 93   MCH Latest Ref Range: 26.5 - 33.0 pg    27.6       28.3 28.1 28.3   MCHC Latest Ref Range: 31.5 - 36.5 g/dL    31.2 (L)       33.1 31.9 30.4 (L)   RDW Latest Ref Range: 10.0 - 15.0 %    27.6 (H)       23.9 (H) 24.9 (H) 24.6 (H)   Diff Method Unknown    Manual Differential         Manual Differential   % Neutrophils Latest Units: %    51.0         58.0   % Lymphocytes Latest Units: %    34.0         18.0   % Monocytes Latest Units: %    9.0         18.0   % Eosinophils Latest Units: %    3.0         5.0   % Basophils Latest Units: %    0.0         0.0   % Metamyelocytes Latest Units: %             1.0   Nucleated RBCs Latest Ref Range: 0 /100    80 (H)         89 (H)   Absolute Neutrophil Latest Ref Range: 1.6 - 8.3 10e9/L    2.0          1.9   Absolute Lymphocytes Latest Ref Range: 0.8 - 5.3 10e9/L    1.3         0.6 (L)   Absolute Monocytes Latest Ref Range: 0.0 - 1.3 10e9/L    0.4         0.6   Absolute Eosinophils Latest Ref Range: 0.0 - 0.7 10e9/L    0.1         0.2   Absolute Basophils Latest Ref Range: 0.0 - 0.2 10e9/L    0.0         0.0   Absolute Metamyelocytes Latest Ref Range: 0 10e9/L             0.0   Absolute Nucleated RBC Unknown    3.1         2.8   Anisocytosis Unknown    Marked         Marked   Spherocytes Unknown    Slide reviewed by Pathologist            RBC Fragments Unknown    Slight         Slight   Teardrop Cells Unknown             Slight   Acanthocytes Unknown    Moderate            Target Cells Unknown    Slight         Marked   [place] Cells Unknown    Slight            Microcytes Unknown             Present   Macrocytes Unknown    Present            Lynch Delaplaine Bodies Unknown             Present   Hypochromasia Unknown             Present   Platelet Estimate Unknown    Automated count confirmed.  Giant platelets are present.         Automated count confirmed.  Giant platelets are present.   % Retic Latest Ref Range: 0.5 - 2.0 %           0.9     Absolute Retic Latest Ref Range: 25 - 95 10e9/L           25.8     D-Dimer Latest Ref Range: 0.0 - 0.50 ug/ml FEU         2.3 (H)       ABO Unknown    A            RH(D) Unknown    Pos            Antibody Screen Unknown    Neg            Test Valid Only At Latest Units:        [facility]            Specimen Expires Unknown    01/11/2021            Blood Component Type Unknown    Red Blood Cells LeukoReduced Irradiated Red Blood Cells LeukoReduced Irradiated           Unit Number Unknown    [id number]           Division Number Unknown    00 00           Status of Unit Unknown    Released to care unit No longer available 01/12/2021 0300           Crossmatch Unknown    Red Blood Cells            Unit Status Unknown    ISS RET           Flu A/B  & SARS-COV-2 PCR Source Unknown       Nasopharyngeal         SARS-CoV-2 PCR Result Unknown       NEGATIVE         Influenza A Latest Ref Range: NEG^Negative        Negative         Influenza B Latest Ref Range: NEG^Negative        Negative         Respiratory Syncytial Virus PCR Latest Ref Range: NEG^Negative        Negative         Haptoglobin Latest Ref Range: 32 - 197 mg/dL           <3 (L)     XR CHEST PORT 1 VW Unknown        Rpt        CT CHEST PULMONARY EMBOLISM W CONTRAST Unknown  Rpt              US ABDOMEN LIMITED Unknown          Rpt            Rafita Rogel MD

## 2021-01-14 NOTE — PROGRESS NOTES
Bonny is a 75 year old who is being evaluated via a billable telephone visit.      What phone number would you like to be contacted at? [phone]  How would you like to obtain your AVS? Mail a copy   Vitals - Patient Reported  Weight (Patient Reported): 56.7 kg (125 lb)  Height (Patient Reported): 152.4 cm (5')  BMI (Based on Pt Reported Ht/Wt): 24.41  Pain Score: No Pain (0)    Leola Knutson CMA January 14, 2021  4:14 PM     Phone call duration: 20 minutes    This is a 20-minute telephone visit with Bonny who has chronic aplastic anemia.  She was briefly hospitalized 10 days ago for fatigue and more severe anemia associated with substantially abnormal liver function.  Her liver function had been mildly abnormal earlier in the fall but her hemoglobin then fell to 7.7.  It may have been attributable to excess Tylenol which was discontinued and she was advised to reduce her eltrombopag dosing to 125 mg daily.  She was given 1 unit of blood in the hospital but no other abnormalities were identified.    Today she feels better but not fully back to baseline.  Her activity level is improving and she no longer has dyspnea on exertion or chest pain.  She is eating acceptably and has had no fever chills rash bleeding or bruising since she has been home from the hospital.  She has 2 sore slightly loose teeth, the posterior lower molars on each side so she feels like her cheeks are a bit swollen and is going to make arrangements for a dental exam.  She thinks she may need dental extractions.    The rest of her review of systems is unrevealing.    Laboratory testing from 2 days ago shows improvement in her hemoglobin to 8.7 with a stable white count and platelet count.  Her liver functions remain abnormal but are much closer to baseline.  Her bilirubin remains elevated at 4.4, ALT twice normal at 106, AST finally normal at 44.  I reemphasized the need to avoid Tylenol or any excess medications that may need liver  detoxification.  She has some trouble sleeping so I suggested Benadryl and avoid any other longer acting sleeping pills or narcotics.    If she needs a dental extraction, low-dose oxycodone would be suitable but preferably that be delayed until her liver function fully returns towards normal.    She will continue on cyclosporine 25 mg twice daily and eltrombopag 125 mg in the evening and avoid excess other medications.    We will recheck her labs in 2 weeks and I will probably see her again in 4 weeks but will talk after the laboratory results are available    Rafita Rogel MD    Professor of medicine    Results for LISSETH PARIKH (MRN [id number]) as of 1/15/2021 14:29   Ref. Range 12/8/2020 13:19 12/21/2020 17:20 12/21/2020 17:22 1/8/2021 20:00 1/8/2021 20:00 1/8/2021 20:28 1/8/2021 20:55 1/8/2021 21:00 1/8/2021 21:32 1/9/2021 09:28 1/9/2021 14:42 1/10/2021 11:06 1/12/2021 14:29   Sodium Latest Ref Range: 133 - 144 mmol/L    135       139 140 137   Potassium Latest Ref Range: 3.4 - 5.3 mmol/L    3.9       4.4 4.4 4.1   Chloride Latest Ref Range: 94 - 109 mmol/L    103       107 109 104   Carbon Dioxide Latest Ref Range: 20 - 32 mmol/L    23       25 24 26   Urea Nitrogen Latest Ref Range: 7 - 30 mg/dL    32 (H)       28 31 (H) 25   Creatinine Latest Ref Range: 0.52 - 1.04 mg/dL   1.8 (H) 1.83 (H)       1.61 (H) 1.61 (H) 1.61 (H)   GFR Estimate Latest Ref Range: >60 mL/min/1.73_m2   27 (L) 26 (L)       31 (L) 31 (L) 31 (L)   GFR Estimate If Black Latest Ref Range: >60 mL/min/1.73_m2   33 (L) 30 (L)       35 (L) 35 (L) 35 (L)   Calcium Latest Ref Range: 8.5 - 10.1 mg/dL    8.1 (L)       8.1 (L) 8.2 (L) 8.6   Anion Gap Latest Ref Range: 3 - 14 mmol/L    9       7 7 7   Albumin Latest Ref Range: 3.4 - 5.0 g/dL    2.7 (L)       2.2 (L) 2.2 (L) 2.6 (L)   Protein Total Latest Ref Range: 6.8 - 8.8 g/dL    7.1       6.1 (L) 6.2 (L) 6.9   Bilirubin Total Latest Ref Range: 0.2 - 1.3 mg/dL    6.2 (H)       5.3 (H) 4.2 (H)  4.4 (H)   Alkaline Phosphatase Latest Ref Range: 40 - 150 U/L    131       109 108 99   ALT Latest Ref Range: 0 - 50 U/L    245 (H)       175 (H) 147 (H) 106 (H)   AST Latest Ref Range: 0 - 45 U/L    196 (H)       116 (H) 77 (H) 44   Bilirubin Direct Latest Ref Range: 0.0 - 0.2 mg/dL           2.2 (H) 1.6 (H)    Lactate Dehydrogenase Latest Ref Range: 81 - 234 U/L           269 (H)     N-Terminal Pro BNP Inpatient Latest Ref Range: 0 - 1,800 pg/mL    600            Troponin I ES Latest Ref Range: 0.000 - 0.045 ug/L    <0.015            Glucose Latest Ref Range: 70 - 99 mg/dL    122 (H)       107 (H) 117 (H) 120 (H)   WBC Latest Ref Range: 4.0 - 11.0 10e9/L    3.9 (L)       3.7 (L) 4.7 3.2 (L)   Hemoglobin Latest Ref Range: 11.7 - 15.7 g/dL    7.7 (L)       8.3 (L) 8.1 (L) 8.7 (L)   Hematocrit Latest Ref Range: 35.0 - 47.0 %    24.7 (L)       25.1 (L) 25.4 (L) 28.6 (L)   Platelet Count Latest Ref Range: 150 - 450 10e9/L    119 (L)       102 (L) 106 (L) 112 (L)   RBC Count Latest Ref Range: 3.8 - 5.2 10e12/L    2.79 (L)       2.93 (L) 2.88 (L) 3.07 (L)   MCV Latest Ref Range: 78 - 100 fl    89       86 88 93   MCH Latest Ref Range: 26.5 - 33.0 pg    27.6       28.3 28.1 28.3   MCHC Latest Ref Range: 31.5 - 36.5 g/dL    31.2 (L)       33.1 31.9 30.4 (L)   RDW Latest Ref Range: 10.0 - 15.0 %    27.6 (H)       23.9 (H) 24.9 (H) 24.6 (H)   Diff Method Unknown    Manual Differential         Manual Differential   % Neutrophils Latest Units: %    51.0         58.0   % Lymphocytes Latest Units: %    34.0         18.0   % Monocytes Latest Units: %    9.0         18.0   % Eosinophils Latest Units: %    3.0         5.0   % Basophils Latest Units: %    0.0         0.0   % Metamyelocytes Latest Units: %             1.0   Nucleated RBCs Latest Ref Range: 0 /100    80 (H)         89 (H)   Absolute Neutrophil Latest Ref Range: 1.6 - 8.3 10e9/L    2.0         1.9   Absolute Lymphocytes Latest Ref Range: 0.8 - 5.3 10e9/L    1.3          0.6 (L)   Absolute Monocytes Latest Ref Range: 0.0 - 1.3 10e9/L    0.4         0.6   Absolute Eosinophils Latest Ref Range: 0.0 - 0.7 10e9/L    0.1         0.2   Absolute Basophils Latest Ref Range: 0.0 - 0.2 10e9/L    0.0         0.0   Absolute Metamyelocytes Latest Ref Range: 0 10e9/L             0.0   Absolute Nucleated RBC Unknown    3.1         2.8   Anisocytosis Unknown    Marked         Marked   Spherocytes Unknown    Slide reviewed by Pathologist            RBC Fragments Unknown    Slight         Slight   Teardrop Cells Unknown             Slight   Acanthocytes Unknown    Moderate            Target Cells Unknown    Slight         Marked   Quang Cells Unknown    Slight            Microcytes Unknown             Present   Macrocytes Unknown    Present            [place] Bodies Unknown             Present   Hypochromasia Unknown             Present   Platelet Estimate Unknown    Automated count confirmed.  Giant platelets are present.         Automated count confirmed.  Giant platelets are present.   % Retic Latest Ref Range: 0.5 - 2.0 %           0.9     Absolute Retic Latest Ref Range: 25 - 95 10e9/L           25.8     D-Dimer Latest Ref Range: 0.0 - 0.50 ug/ml FEU         2.3 (H)       ABO Unknown    A            RH(D) Unknown    Pos            Antibody Screen Unknown    Neg            Test Valid Only At Latest Units:        [facility]            Specimen Expires Unknown    01/11/2021            Blood Component Type Unknown    Red Blood Cells LeukoReduced Irradiated Red Blood Cells LeukoReduced Irradiated           Unit Number Unknown    [id number]           Division Number Unknown    00 00           Status of Unit Unknown    Released to care unit No longer available 01/12/2021 0300           Crossmatch Unknown    Red Blood Cells            Unit Status Unknown    ISS RET           Flu A/B & SARS-COV-2 PCR Source Unknown       Nasopharyngeal         SARS-CoV-2 PCR Result  Unknown       NEGATIVE         Influenza A Latest Ref Range: NEG^Negative        Negative         Influenza B Latest Ref Range: NEG^Negative        Negative         Respiratory Syncytial Virus PCR Latest Ref Range: NEG^Negative        Negative         Haptoglobin Latest Ref Range: 32 - 197 mg/dL           <3 (L)     XR CHEST PORT 1 VW Unknown        Rpt        CT CHEST PULMONARY EMBOLISM W CONTRAST Unknown  Rpt              US ABDOMEN LIMITED Unknown          Rpt

## 2021-01-27 ENCOUNTER — HOSPITAL ENCOUNTER (OUTPATIENT)
Facility: CLINIC | Age: 76
Setting detail: SPECIMEN
Discharge: HOME | End: 2021-01-27
Attending: INTERNAL MEDICINE | Admitting: INTERNAL MEDICINE
Payer: COMMERCIAL

## 2021-01-27 ENCOUNTER — OFFICE VISIT (OUTPATIENT)
Dept: INFUSION THERAPY | Facility: CLINIC | Age: 76
End: 2021-01-27
Attending: INTERNAL MEDICINE
Payer: COMMERCIAL

## 2021-01-27 DIAGNOSIS — D61.9 APLASTIC ANEMIA (H): ICD-10-CM

## 2021-01-27 DIAGNOSIS — N18.4 CKD (CHRONIC KIDNEY DISEASE) STAGE 4, GFR 15-29 ML/MIN (H): ICD-10-CM

## 2021-01-27 LAB
ALBUMIN SERPL-MCNC: 2.7 G/DL (ref 3.4–5)
ALP SERPL-CCNC: 69 U/L (ref 40–150)
ALT SERPL W P-5'-P-CCNC: 25 U/L (ref 0–50)
ANION GAP SERPL CALCULATED.3IONS-SCNC: 7 MMOL/L (ref 3–14)
ANISOCYTOSIS BLD QL SMEAR: ABNORMAL
AST SERPL W P-5'-P-CCNC: 22 U/L (ref 0–45)
BASOPHILS # BLD AUTO: 0 10E9/L (ref 0–0.2)
BASOPHILS NFR BLD AUTO: 1 %
BILIRUB SERPL-MCNC: 2.4 MG/DL (ref 0.2–1.3)
BUN SERPL-MCNC: 24 MG/DL (ref 7–30)
CALCIUM SERPL-MCNC: 9.1 MG/DL (ref 8.5–10.1)
CHLORIDE SERPL-SCNC: 109 MMOL/L (ref 94–109)
CO2 SERPL-SCNC: 25 MMOL/L (ref 20–32)
CREAT SERPL-MCNC: 1.59 MG/DL (ref 0.52–1.04)
DACRYOCYTES BLD QL SMEAR: SLIGHT
DIFFERENTIAL METHOD BLD: ABNORMAL
EOSINOPHIL # BLD AUTO: 0.2 10E9/L (ref 0–0.7)
EOSINOPHIL NFR BLD AUTO: 7 %
ERYTHROCYTE [DISTWIDTH] IN BLOOD BY AUTOMATED COUNT: 26.2 % (ref 10–15)
GFR SERPL CREATININE-BSD FRML MDRD: 31 ML/MIN/{1.73_M2}
GLUCOSE SERPL-MCNC: 89 MG/DL (ref 70–99)
HCT VFR BLD AUTO: 30.2 % (ref 35–47)
HGB BLD-MCNC: 9.1 G/DL (ref 11.7–15.7)
HYPOCHROMIA BLD QL: PRESENT
LYMPHOCYTES # BLD AUTO: 1.1 10E9/L (ref 0.8–5.3)
LYMPHOCYTES NFR BLD AUTO: 30 %
MCH RBC QN AUTO: 30.5 PG (ref 26.5–33)
MCHC RBC AUTO-ENTMCNC: 30.1 G/DL (ref 31.5–36.5)
MCV RBC AUTO: 101 FL (ref 78–100)
MICROCYTES BLD QL SMEAR: PRESENT
MONOCYTES # BLD AUTO: 0.6 10E9/L (ref 0–1.3)
MONOCYTES NFR BLD AUTO: 17 %
NEUTROPHILS # BLD AUTO: 1.6 10E9/L (ref 1.6–8.3)
NEUTROPHILS NFR BLD AUTO: 45 %
NRBC # BLD AUTO: 0.1 10*3/UL
NRBC BLD AUTO-RTO: 4 /100
PLATELET # BLD AUTO: 107 10E9/L (ref 150–450)
PLATELET # BLD EST: ABNORMAL 10*3/UL
POTASSIUM SERPL-SCNC: 4 MMOL/L (ref 3.4–5.3)
PROT SERPL-MCNC: 6.9 G/DL (ref 6.8–8.8)
RBC # BLD AUTO: 2.98 10E12/L (ref 3.8–5.2)
RBC INCLUSIONS BLD: ABNORMAL
RETICS # AUTO: 53.3 10E9/L (ref 25–95)
RETICS/RBC NFR AUTO: 1.8 % (ref 0.5–2)
SODIUM SERPL-SCNC: 141 MMOL/L (ref 133–144)
TARGETS BLD QL SMEAR: SLIGHT
WBC # BLD AUTO: 3.5 10E9/L (ref 4–11)

## 2021-01-27 PROCEDURE — 80053 COMPREHEN METABOLIC PANEL: CPT | Performed by: INTERNAL MEDICINE

## 2021-01-27 PROCEDURE — 85025 COMPLETE CBC W/AUTO DIFF WBC: CPT | Performed by: INTERNAL MEDICINE

## 2021-01-27 PROCEDURE — 85045 AUTOMATED RETICULOCYTE COUNT: CPT | Performed by: INTERNAL MEDICINE

## 2021-01-27 PROCEDURE — 36415 COLL VENOUS BLD VENIPUNCTURE: CPT

## 2021-01-27 NOTE — LETTER
1/27/2021         RE: Bonny Raymundo  [address]  [facility] [address]        Dear Colleague,    Thank you for referring your patient, Bonny Raymundo, to the [facility]. Please see a copy of my visit note below.    Medical Assistant Note:  Bonny Raymundo presents today for blood draw.    Patient seen by provider today: No.   present during visit today: Not Applicable.    Concerns: No Concerns.    Procedure:  Lab draw site: lac, Needle type: bf, Gauge: 21.    Post Assessment:  Labs drawn without difficulty: Yes.    Discharge Plan:  Departure Mode: Ambulatory.    Face to Face Time: 5 min  .    Bianca King CMA              Again, thank you for allowing me to participate in the care of your patient.        Sincerely,         Lab Draw

## 2021-01-27 NOTE — PROGRESS NOTES
Medical Assistant Note:  Bonny Raymundo presents today for blood draw.    Patient seen by provider today: No.   present during visit today: Not Applicable.    Concerns: No Concerns.    Procedure:  Lab draw site: lac, Needle type: bf, Gauge: 21.    Post Assessment:  Labs drawn without difficulty: Yes.    Discharge Plan:  Departure Mode: Ambulatory.    Face to Face Time: 5 min  .    Bianca King, CMA

## 2021-02-09 ENCOUNTER — TELEPHONE (OUTPATIENT)
Dept: TRANSPLANT | Facility: CLINIC | Age: 76
End: 2021-02-09

## 2021-02-09 NOTE — TELEPHONE ENCOUNTER
"Oncology RN Care Coordination Note:     Outgoing Call:  Writer returned patient's call wondering if she needed labs again prior to her appointment with Dr. Rogel and per the appt notes, the labs were scheduled on 01.27.2021 for the appointment with Dr. Rogel on 02.18.2021.  Bonny said \"that's fine, that's all I needed to know I can ask my other questions to Dr. Oneal when I see him on the 18th.\"  Then she hung up.  Writer was unable to address any of the other questions patient left with the triage staff.     Karley Cunha, RN BSN   [facility]  Nurse Coordinator        "

## 2021-02-09 NOTE — TELEPHONE ENCOUNTER
Pt called in to triage stating her last lab draw was 1/27, reviewed results with her, she was very happy to hear Hgb was going up (9.1) and total bili was down (2.4). Her next visit with Dr. Rogel is 2/18 and wonders if she needs another lab draw before then. She has stopped her Prolia since last visit.    Also stated dental extraction (2 teeth) is scheduled for 3/3, does she need a CBC right before? Routing to care team for follow-up.

## 2021-02-16 ENCOUNTER — IMMUNIZATION (OUTPATIENT)
Dept: PEDIATRICS | Facility: CLINIC | Age: 76
End: 2021-02-16
Payer: COMMERCIAL

## 2021-02-16 PROCEDURE — 91300 PR COVID VAC PFIZER DIL RECON 30 MCG/0.3 ML IM: CPT

## 2021-02-16 PROCEDURE — 0001A PR COVID VAC PFIZER DIL RECON 30 MCG/0.3 ML IM: CPT

## 2021-02-18 ENCOUNTER — VIRTUAL VISIT (OUTPATIENT)
Dept: TRANSPLANT | Facility: CLINIC | Age: 76
End: 2021-02-18
Attending: INTERNAL MEDICINE
Payer: COMMERCIAL

## 2021-02-18 DIAGNOSIS — N18.4 CKD (CHRONIC KIDNEY DISEASE) STAGE 4, GFR 15-29 ML/MIN (H): ICD-10-CM

## 2021-02-18 DIAGNOSIS — D61.9 APLASTIC ANEMIA (H): ICD-10-CM

## 2021-02-18 PROCEDURE — 99442 PR PHYSICIAN TELEPHONE EVALUATION 11-20 MIN: CPT | Mod: 95 | Performed by: INTERNAL MEDICINE

## 2021-02-18 PROCEDURE — 999N001193 HC VIDEO/TELEPHONE VISIT; NO CHARGE

## 2021-02-18 NOTE — LETTER
2/18/2021         RE: Bonny Parikh  [address]  [facility] [address]        Dear Colleague,    Thank you for referring your patient, Bonny Parikh, to the [facility] [place]. Please see a copy of my visit note below.    Bonny Parikh is a 75 year old who is being evaluated via a billable telephone visit.      What phone number would you like to be contacted at? [phone]   How would you like to obtain your AVS? MyChart  Phone call duration:15 minutes    Mirella Dangelo CMA on 2/18/2021 at 3:32 PM    This is a 15-minute phone visit with Bonny.  She was hospitalized briefly in January with more anemia and abnormal liver function. Things are improving towards normal but her energy is still not back to baseline.  She is more tired walking her dog even a short distance now though the extreme cold may compromise figuring that out.  She has had no abdominal discomfort, GI upset or new sites of pain.  She has no cough or wheezing and nothing that she thinks feels like an infection.  She has no bleeding.    She is due for some dental extractions in early March and so we will recheck her labs and coagulation testing before then.  We otherwise discussed that her last lab tests showed her improving back to her baseline hemoglobin of roughly 9 and liver functions nearly normal.  We will continue the low-dose cyclosporine 25 twice a day and eltrombopag 125/day for now.  We will not add any additional medication at this point until we are sure that her hematologic situation has stabilized.    We will get labs in the next 10 to 14 days before her dental visit and I will see her again in 2 months time.  She knows to call if additional problems develop    Rafita Rogel MD    Professor of medicine    Results for BONNY PARIKH (MRN [id number]) as of 2/18/2021 16:07   Ref. Range 1/9/2021 14:42 1/10/2021 11:06 1/12/2021 14:29 1/27/2021 11:10   Sodium Latest Ref  Range: 133 - 144 mmol/L 139 140 137 141   Potassium Latest Ref Range: 3.4 - 5.3 mmol/L 4.4 4.4 4.1 4.0   Chloride Latest Ref Range: 94 - 109 mmol/L 107 109 104 109   Carbon Dioxide Latest Ref Range: 20 - 32 mmol/L 25 24 26 25   Urea Nitrogen Latest Ref Range: 7 - 30 mg/dL 28 31 (H) 25 24   Creatinine Latest Ref Range: 0.52 - 1.04 mg/dL 1.61 (H) 1.61 (H) 1.61 (H) 1.59 (H)   GFR Estimate Latest Ref Range: >60 mL/min/1.73_m2 31 (L) 31 (L) 31 (L) 31 (L)   GFR Estimate If Black Latest Ref Range: >60 mL/min/1.73_m2 35 (L) 35 (L) 35 (L) 36 (L)   Calcium Latest Ref Range: 8.5 - 10.1 mg/dL 8.1 (L) 8.2 (L) 8.6 9.1   Anion Gap Latest Ref Range: 3 - 14 mmol/L 7 7 7 7   Albumin Latest Ref Range: 3.4 - 5.0 g/dL 2.2 (L) 2.2 (L) 2.6 (L) 2.7 (L)   Protein Total Latest Ref Range: 6.8 - 8.8 g/dL 6.1 (L) 6.2 (L) 6.9 6.9   Bilirubin Total Latest Ref Range: 0.2 - 1.3 mg/dL 5.3 (H) 4.2 (H) 4.4 (H) 2.4 (H)   Alkaline Phosphatase Latest Ref Range: 40 - 150 U/L 109 108 99 69   ALT Latest Ref Range: 0 - 50 U/L 175 (H) 147 (H) 106 (H) 25   AST Latest Ref Range: 0 - 45 U/L 116 (H) 77 (H) 44 22   Bilirubin Direct Latest Ref Range: 0.0 - 0.2 mg/dL 2.2 (H) 1.6 (H)     Lactate Dehydrogenase Latest Ref Range: 81 - 234 U/L 269 (H)      Glucose Latest Ref Range: 70 - 99 mg/dL 107 (H) 117 (H) 120 (H) 89   WBC Latest Ref Range: 4.0 - 11.0 10e9/L 3.7 (L) 4.7 3.2 (L) 3.5 (L)   Hemoglobin Latest Ref Range: 11.7 - 15.7 g/dL 8.3 (L) 8.1 (L) 8.7 (L) 9.1 (L)   Hematocrit Latest Ref Range: 35.0 - 47.0 % 25.1 (L) 25.4 (L) 28.6 (L) 30.2 (L)   Platelet Count Latest Ref Range: 150 - 450 10e9/L 102 (L) 106 (L) 112 (L) 107 (L)   RBC Count Latest Ref Range: 3.8 - 5.2 10e12/L 2.93 (L) 2.88 (L) 3.07 (L) 2.98 (L)   MCV Latest Ref Range: 78 - 100 fl 86 88 93 101 (H)   MCH Latest Ref Range: 26.5 - 33.0 pg 28.3 28.1 28.3 30.5   MCHC Latest Ref Range: 31.5 - 36.5 g/dL 33.1 31.9 30.4 (L) 30.1 (L)   RDW Latest Ref Range: 10.0 - 15.0 % 23.9 (H) 24.9 (H) 24.6 (H) 26.2 (H)   Diff  Method Unknown   Manual Differential Manual Differential   % Neutrophils Latest Units: %   58.0 45.0   % Lymphocytes Latest Units: %   18.0 30.0   % Monocytes Latest Units: %   18.0 17.0   % Eosinophils Latest Units: %   5.0 7.0   % Basophils Latest Units: %   0.0 1.0   % Metamyelocytes Latest Units: %   1.0    Nucleated RBCs Latest Ref Range: 0 /100   89 (H) 4 (H)   Absolute Neutrophil Latest Ref Range: 1.6 - 8.3 10e9/L   1.9 1.6   Absolute Lymphocytes Latest Ref Range: 0.8 - 5.3 10e9/L   0.6 (L) 1.1   Absolute Monocytes Latest Ref Range: 0.0 - 1.3 10e9/L   0.6 0.6   Absolute Eosinophils Latest Ref Range: 0.0 - 0.7 10e9/L   0.2 0.2   Absolute Basophils Latest Ref Range: 0.0 - 0.2 10e9/L   0.0 0.0   Absolute Metamyelocytes Latest Ref Range: 0 10e9/L   0.0    Absolute Nucleated RBC Unknown   2.8 0.1   Anisocytosis Unknown   Marked Marked   RBC Fragments Unknown   Slight Moderate   Teardrop Cells Unknown   Slight Slight   Target Cells Unknown   Marked Slight   Microcytes Unknown   Present Present   [place] Bodies Unknown   Present    Hypochromasia Unknown   Present Present   Platelet Estimate Unknown   Automated count confirmed.  Giant platelets are present. Automated count confirmed.  Platelet morphology is normal.   % Retic Latest Ref Range: 0.5 - 2.0 % 0.9   1.8   Absolute Retic Latest Ref Range: 25 - 95 10e9/L 25.8   53.3   Haptoglobin Latest Ref Range: 32 - 197 mg/dL <3 (L)              Again, thank you for allowing me to participate in the care of your patient.        Sincerely,        Rafita Rogel MD

## 2021-02-18 NOTE — LETTER
2/18/2021         RE: Bonny Parikh  [address]  [facility] [address]      Bonny Parikh is a 75 year old who is being evaluated via a billable telephone visit.      What phone number would you like to be contacted at? [phone]   How would you like to obtain your AVS? [facility]  Phone call duration:15 minutes    Mirella Dangelo CMA on 2/18/2021 at 3:32 PM    This is a 15-minute phone visit with Bonny.  She was hospitalized briefly in January with more anemia and abnormal liver function. Things are improving towards normal but her energy is still not back to baseline.  She is more tired walking her dog even a short distance now though the extreme cold may compromise figuring that out.  She has had no abdominal discomfort, GI upset or new sites of pain.  She has no cough or wheezing and nothing that she thinks feels like an infection.  She has no bleeding.    She is due for some dental extractions in early March and so we will recheck her labs and coagulation testing before then.  We otherwise discussed that her last lab tests showed her improving back to her baseline hemoglobin of roughly 9 and liver functions nearly normal.  We will continue the low-dose cyclosporine 25 twice a day and eltrombopag 125/day for now.  We will not add any additional medication at this point until we are sure that her hematologic situation has stabilized.    We will get labs in the next 10 to 14 days before her dental visit and I will see her again in 2 months time.  She knows to call if additional problems develop    Rafita Rogel MD    Professor of medicine    Results for BONNY PARIKH (MRN [id number]) as of 2/18/2021 16:07   Ref. Range 1/9/2021 14:42 1/10/2021 11:06 1/12/2021 14:29 1/27/2021 11:10   Sodium Latest Ref Range: 133 - 144 mmol/L 139 140 137 141   Potassium Latest Ref Range: 3.4 - 5.3 mmol/L 4.4 4.4 4.1 4.0   Chloride Latest Ref Range: 94 - 109 mmol/L 107 109 104 109   Carbon Dioxide Latest Ref  Range: 20 - 32 mmol/L 25 24 26 25   Urea Nitrogen Latest Ref Range: 7 - 30 mg/dL 28 31 (H) 25 24   Creatinine Latest Ref Range: 0.52 - 1.04 mg/dL 1.61 (H) 1.61 (H) 1.61 (H) 1.59 (H)   GFR Estimate Latest Ref Range: >60 mL/min/1.73_m2 31 (L) 31 (L) 31 (L) 31 (L)   GFR Estimate If Black Latest Ref Range: >60 mL/min/1.73_m2 35 (L) 35 (L) 35 (L) 36 (L)   Calcium Latest Ref Range: 8.5 - 10.1 mg/dL 8.1 (L) 8.2 (L) 8.6 9.1   Anion Gap Latest Ref Range: 3 - 14 mmol/L 7 7 7 7   Albumin Latest Ref Range: 3.4 - 5.0 g/dL 2.2 (L) 2.2 (L) 2.6 (L) 2.7 (L)   Protein Total Latest Ref Range: 6.8 - 8.8 g/dL 6.1 (L) 6.2 (L) 6.9 6.9   Bilirubin Total Latest Ref Range: 0.2 - 1.3 mg/dL 5.3 (H) 4.2 (H) 4.4 (H) 2.4 (H)   Alkaline Phosphatase Latest Ref Range: 40 - 150 U/L 109 108 99 69   ALT Latest Ref Range: 0 - 50 U/L 175 (H) 147 (H) 106 (H) 25   AST Latest Ref Range: 0 - 45 U/L 116 (H) 77 (H) 44 22   Bilirubin Direct Latest Ref Range: 0.0 - 0.2 mg/dL 2.2 (H) 1.6 (H)     Lactate Dehydrogenase Latest Ref Range: 81 - 234 U/L 269 (H)      Glucose Latest Ref Range: 70 - 99 mg/dL 107 (H) 117 (H) 120 (H) 89   WBC Latest Ref Range: 4.0 - 11.0 10e9/L 3.7 (L) 4.7 3.2 (L) 3.5 (L)   Hemoglobin Latest Ref Range: 11.7 - 15.7 g/dL 8.3 (L) 8.1 (L) 8.7 (L) 9.1 (L)   Hematocrit Latest Ref Range: 35.0 - 47.0 % 25.1 (L) 25.4 (L) 28.6 (L) 30.2 (L)   Platelet Count Latest Ref Range: 150 - 450 10e9/L 102 (L) 106 (L) 112 (L) 107 (L)   RBC Count Latest Ref Range: 3.8 - 5.2 10e12/L 2.93 (L) 2.88 (L) 3.07 (L) 2.98 (L)   MCV Latest Ref Range: 78 - 100 fl 86 88 93 101 (H)   MCH Latest Ref Range: 26.5 - 33.0 pg 28.3 28.1 28.3 30.5   MCHC Latest Ref Range: 31.5 - 36.5 g/dL 33.1 31.9 30.4 (L) 30.1 (L)   RDW Latest Ref Range: 10.0 - 15.0 % 23.9 (H) 24.9 (H) 24.6 (H) 26.2 (H)   Diff Method Unknown   Manual Differential Manual Differential   % Neutrophils Latest Units: %   58.0 45.0   % Lymphocytes Latest Units: %   18.0 30.0   % Monocytes Latest Units: %   18.0 17.0   %  Eosinophils Latest Units: %   5.0 7.0   % Basophils Latest Units: %   0.0 1.0   % Metamyelocytes Latest Units: %   1.0    Nucleated RBCs Latest Ref Range: 0 /100   89 (H) 4 (H)   Absolute Neutrophil Latest Ref Range: 1.6 - 8.3 10e9/L   1.9 1.6   Absolute Lymphocytes Latest Ref Range: 0.8 - 5.3 10e9/L   0.6 (L) 1.1   Absolute Monocytes Latest Ref Range: 0.0 - 1.3 10e9/L   0.6 0.6   Absolute Eosinophils Latest Ref Range: 0.0 - 0.7 10e9/L   0.2 0.2   Absolute Basophils Latest Ref Range: 0.0 - 0.2 10e9/L   0.0 0.0   Absolute Metamyelocytes Latest Ref Range: 0 10e9/L   0.0    Absolute Nucleated RBC Unknown   2.8 0.1   Anisocytosis Unknown   Marked Marked   RBC Fragments Unknown   Slight Moderate   Teardrop Cells Unknown   Slight Slight   Target Cells Unknown   Marked Slight   Microcytes Unknown   Present Present   [place] [employer] Bodies Unknown   Present    Hypochromasia Unknown   Present Present   Platelet Estimate Unknown   Automated count confirmed.  Giant platelets are present. Automated count confirmed.  Platelet morphology is normal.   % Retic Latest Ref Range: 0.5 - 2.0 % 0.9   1.8   Absolute Retic Latest Ref Range: 25 - 95 10e9/L 25.8   53.3   Haptoglobin Latest Ref Range: 32 - 197 mg/dL <3 (L)                Rafita Rogel MD

## 2021-02-18 NOTE — PROGRESS NOTES
Bonny Raymundo is a 75 year old who is being evaluated via a billable telephone visit.      What phone number would you like to be contacted at? [phone]   How would you like to obtain your [facility]? [facility]  Phone call duration:15 minutes    Mirella Dangelo CMA on 2/18/2021 at 3:32 PM    This is a 15-minute phone visit with Bonny.  She was hospitalized briefly in January with more anemia and abnormal liver function. Things are improving towards normal but her energy is still not back to baseline.  She is more tired walking her dog even a short distance now though the extreme cold may compromise figuring that out.  She has had no abdominal discomfort, GI upset or new sites of pain.  She has no cough or wheezing and nothing that she thinks feels like an infection.  She has no bleeding.    She is due for some dental extractions in early March and so we will recheck her labs and coagulation testing before then.  We otherwise discussed that her last lab tests showed her improving back to her baseline hemoglobin of roughly 9 and liver functions nearly normal.  We will continue the low-dose cyclosporine 25 twice a day and eltrombopag 125/day for now.  We will not add any additional medication at this point until we are sure that her hematologic situation has stabilized.    We will get labs in the next 10 to 14 days before her dental visit and I will see her again in 2 months time.  She knows to call if additional problems develop    Rafita Rogel MD    Professor of medicine    Results for BONNY RAYMUNDO (MRN [id number]) as of 2/18/2021 16:07   Ref. Range 1/9/2021 14:42 1/10/2021 11:06 1/12/2021 14:29 1/27/2021 11:10   Sodium Latest Ref Range: 133 - 144 mmol/L 139 140 137 141   Potassium Latest Ref Range: 3.4 - 5.3 mmol/L 4.4 4.4 4.1 4.0   Chloride Latest Ref Range: 94 - 109 mmol/L 107 109 104 109   Carbon Dioxide Latest Ref Range: 20 - 32 mmol/L 25 24 26 25   Urea Nitrogen Latest Ref Range: 7 - 30 mg/dL 28 31 (H) 25  24   Creatinine Latest Ref Range: 0.52 - 1.04 mg/dL 1.61 (H) 1.61 (H) 1.61 (H) 1.59 (H)   GFR Estimate Latest Ref Range: >60 mL/min/1.73_m2 31 (L) 31 (L) 31 (L) 31 (L)   GFR Estimate If Black Latest Ref Range: >60 mL/min/1.73_m2 35 (L) 35 (L) 35 (L) 36 (L)   Calcium Latest Ref Range: 8.5 - 10.1 mg/dL 8.1 (L) 8.2 (L) 8.6 9.1   Anion Gap Latest Ref Range: 3 - 14 mmol/L 7 7 7 7   Albumin Latest Ref Range: 3.4 - 5.0 g/dL 2.2 (L) 2.2 (L) 2.6 (L) 2.7 (L)   Protein Total Latest Ref Range: 6.8 - 8.8 g/dL 6.1 (L) 6.2 (L) 6.9 6.9   Bilirubin Total Latest Ref Range: 0.2 - 1.3 mg/dL 5.3 (H) 4.2 (H) 4.4 (H) 2.4 (H)   Alkaline Phosphatase Latest Ref Range: 40 - 150 U/L 109 108 99 69   ALT Latest Ref Range: 0 - 50 U/L 175 (H) 147 (H) 106 (H) 25   AST Latest Ref Range: 0 - 45 U/L 116 (H) 77 (H) 44 22   Bilirubin Direct Latest Ref Range: 0.0 - 0.2 mg/dL 2.2 (H) 1.6 (H)     Lactate Dehydrogenase Latest Ref Range: 81 - 234 U/L 269 (H)      Glucose Latest Ref Range: 70 - 99 mg/dL 107 (H) 117 (H) 120 (H) 89   WBC Latest Ref Range: 4.0 - 11.0 10e9/L 3.7 (L) 4.7 3.2 (L) 3.5 (L)   Hemoglobin Latest Ref Range: 11.7 - 15.7 g/dL 8.3 (L) 8.1 (L) 8.7 (L) 9.1 (L)   Hematocrit Latest Ref Range: 35.0 - 47.0 % 25.1 (L) 25.4 (L) 28.6 (L) 30.2 (L)   Platelet Count Latest Ref Range: 150 - 450 10e9/L 102 (L) 106 (L) 112 (L) 107 (L)   RBC Count Latest Ref Range: 3.8 - 5.2 10e12/L 2.93 (L) 2.88 (L) 3.07 (L) 2.98 (L)   MCV Latest Ref Range: 78 - 100 fl 86 88 93 101 (H)   MCH Latest Ref Range: 26.5 - 33.0 pg 28.3 28.1 28.3 30.5   MCHC Latest Ref Range: 31.5 - 36.5 g/dL 33.1 31.9 30.4 (L) 30.1 (L)   RDW Latest Ref Range: 10.0 - 15.0 % 23.9 (H) 24.9 (H) 24.6 (H) 26.2 (H)   Diff Method Unknown   Manual Differential Manual Differential   % Neutrophils Latest Units: %   58.0 45.0   % Lymphocytes Latest Units: %   18.0 30.0   % Monocytes Latest Units: %   18.0 17.0   % Eosinophils Latest Units: %   5.0 7.0   % Basophils Latest Units: %   0.0 1.0   %  Metamyelocytes Latest Units: %   1.0    Nucleated RBCs Latest Ref Range: 0 /100   89 (H) 4 (H)   Absolute Neutrophil Latest Ref Range: 1.6 - 8.3 10e9/L   1.9 1.6   Absolute Lymphocytes Latest Ref Range: 0.8 - 5.3 10e9/L   0.6 (L) 1.1   Absolute Monocytes Latest Ref Range: 0.0 - 1.3 10e9/L   0.6 0.6   Absolute Eosinophils Latest Ref Range: 0.0 - 0.7 10e9/L   0.2 0.2   Absolute Basophils Latest Ref Range: 0.0 - 0.2 10e9/L   0.0 0.0   Absolute Metamyelocytes Latest Ref Range: 0 10e9/L   0.0    Absolute Nucleated RBC Unknown   2.8 0.1   Anisocytosis Unknown   Marked Marked   RBC Fragments Unknown   Slight Moderate   Teardrop Cells Unknown   Slight Slight   Target Cells Unknown   Marked Slight   Microcytes Unknown   Present Present   [place] Bodies Unknown   Present    Hypochromasia Unknown   Present Present   Platelet Estimate Unknown   Automated count confirmed.  Giant platelets are present. Automated count confirmed.  Platelet morphology is normal.   % Retic Latest Ref Range: 0.5 - 2.0 % 0.9   1.8   Absolute Retic Latest Ref Range: 25 - 95 10e9/L 25.8   53.3   Haptoglobin Latest Ref Range: 32 - 197 mg/dL <3 (L)

## 2021-02-23 ENCOUNTER — HOSPITAL ENCOUNTER (OUTPATIENT)
Facility: CLINIC | Age: 76
Setting detail: SPECIMEN
Discharge: HOME | End: 2021-02-23
Attending: INTERNAL MEDICINE | Admitting: INTERNAL MEDICINE
Payer: COMMERCIAL

## 2021-02-23 ENCOUNTER — OFFICE VISIT (OUTPATIENT)
Dept: INFUSION THERAPY | Facility: CLINIC | Age: 76
End: 2021-02-23
Attending: INTERNAL MEDICINE
Payer: COMMERCIAL

## 2021-02-23 DIAGNOSIS — D61.9 APLASTIC ANEMIA (H): ICD-10-CM

## 2021-02-23 DIAGNOSIS — N18.4 CKD (CHRONIC KIDNEY DISEASE) STAGE 4, GFR 15-29 ML/MIN (H): ICD-10-CM

## 2021-02-23 LAB
ACANTHOCYTES BLD QL SMEAR: SLIGHT
ALBUMIN SERPL-MCNC: 2.7 G/DL (ref 3.4–5)
ALP SERPL-CCNC: 82 U/L (ref 40–150)
ALT SERPL W P-5'-P-CCNC: 76 U/L (ref 0–50)
ANION GAP SERPL CALCULATED.3IONS-SCNC: 6 MMOL/L (ref 3–14)
ANISOCYTOSIS BLD QL SMEAR: ABNORMAL
AST SERPL W P-5'-P-CCNC: 57 U/L (ref 0–45)
BASOPHILS # BLD AUTO: 0 10E9/L (ref 0–0.2)
BASOPHILS NFR BLD AUTO: 0 %
BILIRUB SERPL-MCNC: 3.4 MG/DL (ref 0.2–1.3)
BUN SERPL-MCNC: 33 MG/DL (ref 7–30)
CALCIUM SERPL-MCNC: 8.7 MG/DL (ref 8.5–10.1)
CHLORIDE SERPL-SCNC: 105 MMOL/L (ref 94–109)
CO2 SERPL-SCNC: 26 MMOL/L (ref 20–32)
CREAT SERPL-MCNC: 1.63 MG/DL (ref 0.52–1.04)
CYCLOSPORINE BLD LC/MS/MS-MCNC: 106 UG/L (ref 50–400)
DACRYOCYTES BLD QL SMEAR: SLIGHT
DIFFERENTIAL METHOD BLD: ABNORMAL
EOSINOPHIL # BLD AUTO: 0.1 10E9/L (ref 0–0.7)
EOSINOPHIL NFR BLD AUTO: 3 %
ERYTHROCYTE [DISTWIDTH] IN BLOOD BY AUTOMATED COUNT: 21.2 % (ref 10–15)
GFR SERPL CREATININE-BSD FRML MDRD: 30 ML/MIN/{1.73_M2}
GLUCOSE SERPL-MCNC: 94 MG/DL (ref 70–99)
HCT VFR BLD AUTO: 30.2 % (ref 35–47)
HGB BLD-MCNC: 9.6 G/DL (ref 11.7–15.7)
INR PPP: 1.19 (ref 0.86–1.14)
LYMPHOCYTES # BLD AUTO: 1.5 10E9/L (ref 0.8–5.3)
LYMPHOCYTES NFR BLD AUTO: 42 %
MCH RBC QN AUTO: 29 PG (ref 26.5–33)
MCHC RBC AUTO-ENTMCNC: 31.8 G/DL (ref 31.5–36.5)
MCV RBC AUTO: 91 FL (ref 78–100)
MONOCYTES # BLD AUTO: 0.8 10E9/L (ref 0–1.3)
MONOCYTES NFR BLD AUTO: 21 %
NEUTROPHILS # BLD AUTO: 1.2 10E9/L (ref 1.6–8.3)
NEUTROPHILS NFR BLD AUTO: 34 %
NRBC # BLD AUTO: 0.6 10*3/UL
NRBC BLD AUTO-RTO: 16 /100
OVALOCYTES BLD QL SMEAR: SLIGHT
PLATELET # BLD AUTO: 108 10E9/L (ref 150–450)
PLATELET # BLD EST: ABNORMAL 10*3/UL
POTASSIUM SERPL-SCNC: 4.2 MMOL/L (ref 3.4–5.3)
PROT SERPL-MCNC: 7.2 G/DL (ref 6.8–8.8)
RBC # BLD AUTO: 3.31 10E12/L (ref 3.8–5.2)
RBC INCLUSIONS BLD: SLIGHT
RETICS # AUTO: 68.5 10E9/L (ref 25–95)
RETICS/RBC NFR AUTO: 2.1 % (ref 0.5–2)
SODIUM SERPL-SCNC: 137 MMOL/L (ref 133–144)
TARGETS BLD QL SMEAR: ABNORMAL
TME LAST DOSE: NORMAL H
WBC # BLD AUTO: 3.6 10E9/L (ref 4–11)

## 2021-02-23 PROCEDURE — 85610 PROTHROMBIN TIME: CPT | Performed by: INTERNAL MEDICINE

## 2021-02-23 PROCEDURE — 36415 COLL VENOUS BLD VENIPUNCTURE: CPT

## 2021-02-23 PROCEDURE — 80053 COMPREHEN METABOLIC PANEL: CPT | Performed by: INTERNAL MEDICINE

## 2021-02-23 PROCEDURE — 85045 AUTOMATED RETICULOCYTE COUNT: CPT | Performed by: INTERNAL MEDICINE

## 2021-02-23 PROCEDURE — 85025 COMPLETE CBC W/AUTO DIFF WBC: CPT | Performed by: INTERNAL MEDICINE

## 2021-02-23 PROCEDURE — 80158 DRUG ASSAY CYCLOSPORINE: CPT | Performed by: INTERNAL MEDICINE

## 2021-02-23 NOTE — PROGRESS NOTES
Medical Assistant Note:  Bonny Raymundo presents today for lab draw.    Patient seen by provider today: No.   present during visit today: Not Applicable.    Concerns: No Concerns.    Procedure:  Lab draw site: RAC, Needle type: BF, Gauge: 23. Gauze and coban applied    Post Assessment:  Labs drawn without difficulty: Yes.    Discharge Plan:  Departure Mode: Ambulatory.    Face to Face Time: 5.    Ramya Zabala CMA

## 2021-02-23 NOTE — LETTER
2/23/2021         RE: Bonny Raymundo  [address]  [facility] [address]        Dear Colleague,    Thank you for referring your patient, Bonny Raymundo, to the [facility]. Please see a copy of my visit note below.    Medical Assistant Note:  Bonny Raymundo presents today for lab draw.    Patient seen by provider today: No.   present during visit today: Not Applicable.    Concerns: No Concerns.    Procedure:  Lab draw site: RAC, Needle type: BF, Gauge: 23. Gauze and coban applied    Post Assessment:  Labs drawn without difficulty: Yes.    Discharge Plan:  Departure Mode: Ambulatory.    Face to Face Time: 5.    Ramya Zabala CMA                Again, thank you for allowing me to participate in the care of your patient.        Sincerely,         Lab Draw

## 2021-03-03 ENCOUNTER — VIRTUAL VISIT (OUTPATIENT)
Dept: FAMILY MEDICINE | Facility: CLINIC | Age: 76
End: 2021-03-03
Payer: COMMERCIAL

## 2021-03-03 ENCOUNTER — TELEPHONE (OUTPATIENT)
Dept: FAMILY MEDICINE | Facility: CLINIC | Age: 76
End: 2021-03-03

## 2021-03-03 DIAGNOSIS — R35.0 URINARY FREQUENCY: Primary | ICD-10-CM

## 2021-03-03 DIAGNOSIS — R35.0 URINARY FREQUENCY: ICD-10-CM

## 2021-03-03 LAB
ALBUMIN UR-MCNC: NEGATIVE MG/DL
APPEARANCE UR: CLEAR
BILIRUB UR QL STRIP: NEGATIVE
COLOR UR AUTO: YELLOW
GLUCOSE UR STRIP-MCNC: NEGATIVE MG/DL
HGB UR QL STRIP: NEGATIVE
KETONES UR STRIP-MCNC: NEGATIVE MG/DL
LEUKOCYTE ESTERASE UR QL STRIP: NEGATIVE
NITRATE UR QL: NEGATIVE
PH UR STRIP: 5.5 PH (ref 5–7)
SOURCE: NORMAL
SP GR UR STRIP: 1.02 (ref 1–1.03)
UROBILINOGEN UR STRIP-ACNC: 1 EU/DL (ref 0.2–1)

## 2021-03-03 PROCEDURE — 99441 PR PHYSICIAN TELEPHONE EVALUATION 5-10 MIN: CPT | Mod: 95 | Performed by: NURSE PRACTITIONER

## 2021-03-03 PROCEDURE — 81003 URINALYSIS AUTO W/O SCOPE: CPT | Performed by: NURSE PRACTITIONER

## 2021-03-03 RX ORDER — CEPHALEXIN 500 MG/1
500 CAPSULE ORAL 2 TIMES DAILY
Qty: 14 CAPSULE | Refills: 0 | Status: SHIPPED | OUTPATIENT
Start: 2021-03-03 | End: 2021-03-10

## 2021-03-03 NOTE — PROGRESS NOTES
Bonny Raymundo is a 75 year old who is being evaluated via a billable telephone visit.      What phone number would you like to be contacted at? [phone]  How would you like to obtain your AVS? Mail a copy    Assessment & Plan   Problem List Items Addressed This Visit     None      Visit Diagnoses     Urinary frequency    -  Primary    Relevant Medications    cephALEXin (KEFLEX) 500 MG capsule    Other Relevant Orders    **UA reflex to Microscopic FUTURE anytime (Completed)           UTI symptoms. Will come to clinic to leave sample then can start abx that are sent in     KHADRA Sanchez CNP  [facility] VITO    Subjective   Bonny Raymundo is a 75 year old who presents for the following health issues    HPI       3-4 days of urinary frequency, urgency   Hasn't had a UTI for about 1 year.   Otherwise is feeling well without any other associated symptoms       Review of Systems   Detailed as above      Objective           Vitals:  No vitals were obtained today due to virtual visit.    Physical Exam   healthy, alert and no distress  PSYCH: Alert and oriented times 3; coherent speech, normal   rate and volume, able to articulate logical thoughts, able   to abstract reason, no tangential thoughts, no hallucinations   or delusions  Her affect is normal  RESP: No cough, no audible wheezing, able to talk in full sentences  Remainder of exam unable to be completed due to telephone visits            Phone call duration: 5 minutes

## 2021-03-03 NOTE — LETTER
March 4, 2021      Bonny PAK Zackary  [address] [facility] [address]        Dear Bonny Cervantes your urine is completely normal. No need to take the antibiotic. Make sure to drink plenty of water.     Resulted Orders   **UA reflex to Microscopic FUTURE anytime   Result Value Ref Range    Color Urine Yellow     Appearance Urine Clear     Glucose Urine Negative NEG^Negative mg/dL    Bilirubin Urine Negative NEG^Negative    Ketones Urine Negative NEG^Negative mg/dL    Specific Gravity Urine 1.025 1.003 - 1.035    Blood Urine Negative NEG^Negative    pH Urine 5.5 5.0 - 7.0 pH    Protein Albumin Urine Negative NEG^Negative mg/dL    Urobilinogen Urine 1.0 0.2 - 1.0 EU/dL    Nitrite Urine Negative NEG^Negative    Leukocyte Esterase Urine Negative NEG^Negative    Source Midstream Urine        If you have any questions or concerns, please call the clinic at the number listed above.       Sincerely,      David Rey, KHADRA CNP        yolette

## 2021-03-04 NOTE — RESULT ENCOUNTER NOTE
Please make sure pt gets this  Bonny your urine is completely normal. No need to take the antibiotic. Make sure to drink plenty of water.

## 2021-03-09 ENCOUNTER — IMMUNIZATION (OUTPATIENT)
Dept: PEDIATRICS | Facility: CLINIC | Age: 76
End: 2021-03-09
Attending: INTERNAL MEDICINE
Payer: COMMERCIAL

## 2021-03-09 PROCEDURE — 91300 PR COVID VAC PFIZER DIL RECON 30 MCG/0.3 ML IM: CPT

## 2021-03-09 PROCEDURE — 0002A PR COVID VAC PFIZER DIL RECON 30 MCG/0.3 ML IM: CPT

## 2021-03-17 ENCOUNTER — APPOINTMENT (OUTPATIENT)
Dept: ULTRASOUND IMAGING | Facility: CLINIC | Age: 76
End: 2021-03-17
Attending: EMERGENCY MEDICINE
Payer: COMMERCIAL

## 2021-03-17 ENCOUNTER — HOSPITAL ENCOUNTER (EMERGENCY)
Facility: CLINIC | Age: 76
Discharge: HOME | End: 2021-03-17
Attending: EMERGENCY MEDICINE | Admitting: EMERGENCY MEDICINE
Payer: COMMERCIAL

## 2021-03-17 ENCOUNTER — APPOINTMENT (OUTPATIENT)
Dept: GENERAL RADIOLOGY | Facility: CLINIC | Age: 76
End: 2021-03-17
Attending: EMERGENCY MEDICINE
Payer: COMMERCIAL

## 2021-03-17 VITALS
BODY MASS INDEX: 22.15 KG/M2 | RESPIRATION RATE: 16 BRPM | HEART RATE: 81 BPM | HEIGHT: 63 IN | TEMPERATURE: 97.7 F | WEIGHT: 125 LBS | SYSTOLIC BLOOD PRESSURE: 136 MMHG | OXYGEN SATURATION: 95 % | DIASTOLIC BLOOD PRESSURE: 78 MMHG

## 2021-03-17 DIAGNOSIS — I82.412 DEEP VEIN THROMBOSIS (DVT) OF FEMORAL VEIN OF LEFT LOWER EXTREMITY, UNSPECIFIED CHRONICITY (H): ICD-10-CM

## 2021-03-17 DIAGNOSIS — N28.9 RENAL INSUFFICIENCY: ICD-10-CM

## 2021-03-17 DIAGNOSIS — D61.9 APLASTIC ANEMIA (H): ICD-10-CM

## 2021-03-17 LAB
ACANTHOCYTES BLD QL SMEAR: SLIGHT
ALBUMIN SERPL-MCNC: 2.6 G/DL (ref 3.4–5)
ALP SERPL-CCNC: 89 U/L (ref 40–150)
ALT SERPL W P-5'-P-CCNC: 58 U/L (ref 0–50)
ANION GAP SERPL CALCULATED.3IONS-SCNC: 14 MMOL/L (ref 3–14)
ANISOCYTOSIS BLD QL SMEAR: ABNORMAL
AST SERPL W P-5'-P-CCNC: 37 U/L (ref 0–45)
BILIRUB SERPL-MCNC: 3.9 MG/DL (ref 0.2–1.3)
BUN SERPL-MCNC: 25 MG/DL (ref 7–30)
BURR CELLS BLD QL SMEAR: SLIGHT
CALCIUM SERPL-MCNC: 8.6 MG/DL (ref 8.5–10.1)
CHLORIDE SERPL-SCNC: 106 MMOL/L (ref 94–109)
CO2 SERPL-SCNC: 20 MMOL/L (ref 20–32)
CREAT SERPL-MCNC: 1.66 MG/DL (ref 0.52–1.04)
DIFFERENTIAL METHOD BLD: ABNORMAL
ELLIPTOCYTES BLD QL SMEAR: SLIGHT
EOSINOPHIL # BLD AUTO: 0.1 10E9/L (ref 0–0.7)
EOSINOPHIL NFR BLD AUTO: 3 %
ERYTHROCYTE [DISTWIDTH] IN BLOOD BY AUTOMATED COUNT: 24.9 % (ref 10–15)
GFR SERPL CREATININE-BSD FRML MDRD: 29 ML/MIN/{1.73_M2}
GLUCOSE SERPL-MCNC: 106 MG/DL (ref 70–99)
HCT VFR BLD AUTO: 27.9 % (ref 35–47)
HGB BLD-MCNC: 8.9 G/DL (ref 11.7–15.7)
HOWELL-JOLLY BOD BLD QL SMEAR: PRESENT
LYMPHOCYTES # BLD AUTO: 1.1 10E9/L (ref 0.8–5.3)
LYMPHOCYTES NFR BLD AUTO: 30 %
MCH RBC QN AUTO: 29.3 PG (ref 26.5–33)
MCHC RBC AUTO-ENTMCNC: 31.9 G/DL (ref 31.5–36.5)
MCV RBC AUTO: 92 FL (ref 78–100)
METAMYELOCYTES # BLD: 0.1 10E9/L
METAMYELOCYTES NFR BLD MANUAL: 2 %
MONOCYTES # BLD AUTO: 0.5 10E9/L (ref 0–1.3)
MONOCYTES NFR BLD AUTO: 14 %
NEUTROPHILS # BLD AUTO: 1.9 10E9/L (ref 1.6–8.3)
NEUTROPHILS NFR BLD AUTO: 51 %
NRBC # BLD AUTO: 2.6 10*3/UL
NRBC BLD AUTO-RTO: 68 /100
PLATELET # BLD AUTO: 96 10E9/L (ref 150–450)
PLATELET # BLD EST: ABNORMAL 10*3/UL
POLYCHROMASIA BLD QL SMEAR: SLIGHT
POTASSIUM SERPL-SCNC: 3.9 MMOL/L (ref 3.4–5.3)
PROT SERPL-MCNC: 7 G/DL (ref 6.8–8.8)
RBC # BLD AUTO: 3.04 10E12/L (ref 3.8–5.2)
RBC INCLUSIONS BLD: SLIGHT
SODIUM SERPL-SCNC: 140 MMOL/L (ref 133–144)
TARGETS BLD QL SMEAR: ABNORMAL
WBC # BLD AUTO: 3.8 10E9/L (ref 4–11)

## 2021-03-17 PROCEDURE — 71046 X-RAY EXAM CHEST 2 VIEWS: CPT

## 2021-03-17 PROCEDURE — 99285 EMERGENCY DEPT VISIT HI MDM: CPT | Mod: 25

## 2021-03-17 PROCEDURE — 85025 COMPLETE CBC W/AUTO DIFF WBC: CPT | Performed by: EMERGENCY MEDICINE

## 2021-03-17 PROCEDURE — 93971 EXTREMITY STUDY: CPT | Mod: LT

## 2021-03-17 PROCEDURE — 80053 COMPREHEN METABOLIC PANEL: CPT | Performed by: EMERGENCY MEDICINE

## 2021-03-17 ASSESSMENT — ENCOUNTER SYMPTOMS
COUGH: 1
SHORTNESS OF BREATH: 0
CHILLS: 0
FEVER: 0

## 2021-03-17 ASSESSMENT — MIFFLIN-ST. JEOR: SCORE: 1021.13

## 2021-03-17 NOTE — ED PROVIDER NOTES
History   Chief Complaint:  Leg Swelling     The history is provided by the patient.      Bonny Raymundo is a 75 year old female with history of DVT and PE, aplastic anemia, who presents with leg swelling. The patient reports that she has had a history of DVT in the past and feels like this is similar. She reports that her yesterday with left leg swelling. She also reports that she has a new cough that started today. She notes that she always feels shortness of breath. She notes that she has both of her COVID vaccines a few weeks ago. She denies chest pain. She denies use of blood thinners. She reports that she sees Dr. Rogel of [facility] for her anemia. She reports that she lives alone.     Review of Systems   Constitutional: Negative for chills and fever.   Respiratory: Positive for cough. Negative for shortness of breath.    Cardiovascular: Positive for leg swelling. Negative for chest pain.   All other systems reviewed and are negative.      Allergies:  Cats  Dogs  Seasonal Allergies    Medications:  Compression stockings  Promacta   Protonix     Past Medical History:    Aplastic  anemia   Closed anterior dislocation of humerus   Osteoporosis   Pulmonary emboli   Sprain of ankle  DVT   Headache  BPPV  CKD stage 4  Dyspnea   Pancytopenia    Past Surgical History:    Arthrodesis foot   Blepharoplasty  Bone marrow biopsy x2  Colonoscopy   Vitrectomy    section   Hysterectomy    Myomectomy     Family History:    MS, mother   Heart disease, father and mother     Social History:  Smoking status: no  Alcohol use: no  Drug use: no  PCP: Shivanibrock SimpsonLaurenroberto Phelps  Presents to the ED alone  She reports that she lives alone in her own home  She states that she is a retired.    Physical Exam     Patient Vitals for the past 24 hrs:   BP Temp Temp src Pulse Resp SpO2 Height Weight   21 1100 136/86 -- -- 79 -- 95 % -- --   21 1045 -- -- -- -- -- 96 % -- --   21 1030 134/74 -- -- 73 -- 96 % -- --   21 1015  "127/72 -- -- 74 -- 96 % -- --   03/17/21 0920 -- -- -- -- -- 95 % -- --   03/17/21 0910 -- -- -- -- -- 95 % -- --   03/17/21 0900 -- -- -- -- -- 96 % -- --   03/17/21 0850 -- -- -- -- -- 97 % -- --   03/17/21 0830 -- -- -- -- -- 97 % -- --   03/17/21 0829 101/69 -- -- 98 -- -- -- --   03/17/21 0816 102/70 97.7  F (36.5  C) Temporal 110 16 98 % 1.6 m (5' 3\") 56.7 kg (125 lb)       Physical Exam  Constitutional: middle age white female, supine.   HENT: No signs of trauma.   Eyes: EOM are normal. Pupils are equal, round, and reactive to light.   Neck: Normal range of motion. No JVD present. No cervical adenopathy.  Cardiovascular: Regular rhythm.  Exam reveals no gallop and no friction rub.    No murmur heard.  Pulmonary/Chest: Bilateral breath sounds normal. No wheezes, rhonchi or rales.  Abdominal: Soft. No tenderness. No rebound or guarding.   Musculoskeletal: . Left leg 2+ femoral popliteal and tibial pulses. Diffuse swelling from ankle to thigh of the left leg.   Lymphadenopathy: No lymphadenopathy.   Neurological: Alert and oriented to person, place, and time. Normal strength. Coordination normal.   Skin: Skin is warm and dry. No rash noted. No erythema.       Emergency Department Course     Imaging:  Chest  XR:  AP and lateral views of the chest were obtained.   Cardiomediastinal silhouette is within normal limits. Minimal left   basilar pulmonary opacities, likely atelectasis. No significant   pleural effusion or pneumothorax. Significant degenerative changes of   the bilateral shoulder joints. Diffuse osteopenia with multiple   compression deformities of the thoracolumbar spine.     Reading per radiology     US Lower extremity venous left:   Positive for deep venous thrombosis throughout the left   lower extremity. Nonocclusive thrombus in the distal common femoral   vein with occlusive thrombus throughout the femoral vein.    Reading per radiology    Laboratory:  CBC: WBC 4.8, HGB 8.9, PLT 96  CMP: Glucose " "106, GFR 29, Creatinine: 1.66    Emergency Department Course:  Reviewed:  I reviewed the patient's nursing notes, vitals, past medical records, Care Everywhere.     Assessments:  0816 I performed an assessment and examination of the patient as noted above.     1046 The patient would like to be discharged home and to consult with her hematology doctor. Findings and plan explained to the Patient. Patient discharged home with instructions regarding supportive care, medications, and reasons to return. The importance of close follow-up was reviewed.     Consults:  3177 I spoke with Dr. Guzmán of the Hemotology service from [facility] regarding patient's presentation, findings, and plan of care.     Disposition:  The patient was discharged to home.       Impression & Plan   Medical Decision Making:  Bonny Raymundo is a 75 year old female who presents to the ED due to left leg swelling and states \" I think I have a DVT again\". She has had previous DVT's with PE and unfortunately she suffers form aplastic anemia of unknown cause and has elevated creatinine. On exam, she has quite and extensive swelling of her left leg going up to the thigh. Ultrasound confirmed DVT. I spoke with the patient about bringing her into the hospital but she was very adamant about wanting to go home. She asked if I would contact her hematologist at the  and I did speak with Dr. Guzmán. We decided that she would be an okay candidate for NOACS and I will start her on Apixaban 10 mg 2x a day for a week and then 5 mg x2  Day. I have highly recommended that she make a follow up appointment with her PMD in the next week and have warned her that if she develops new shortness of breath, chest pain or weakness to return to the ED. Again, I recommened inpatient treatment to start but she did not want this.       Diagnosis:    ICD-10-CM    1. Deep vein thrombosis (DVT) of femoral vein of left lower extremity, unspecified chronicity (H)  I82.412    2. Aplastic " anemia (H)  D61.9    3. Renal insufficiency  N28.9        Discharge Medications:  New Prescriptions    APIXABAN ANTICOAGULANT (ELIQUIS) 5 MG TABLET    Take 2 tablets (10 mg) by mouth 2 times daily    APIXABAN ANTICOAGULANT (ELIQUIS) 5 MG TABLET    Take 1 tablet (5 mg) by mouth 2 times daily Start after the 1st week of 10 mg 2x daily       Scribe Disclosure:  Graciela BAKER, am serving as a scribe at 8:16 AM on 3/17/2021 to document services personally performed by Deshawn Garcia MD based on my observations and the provider's statements to me.           Deshawn Garcia MD  03/17/21 [id number]

## 2021-03-17 NOTE — ED TRIAGE NOTES
"\"I think I have a DVT\" Left elg noticeably swollen with pain. Began yesterday. H/O DVTs in left leg. Pale/ashen appearance.   "

## 2021-03-18 ENCOUNTER — PATIENT OUTREACH (OUTPATIENT)
Dept: NURSING | Facility: CLINIC | Age: 76
End: 2021-03-18
Payer: COMMERCIAL

## 2021-03-18 DIAGNOSIS — Z71.89 OTHER SPECIFIED COUNSELING: ICD-10-CM

## 2021-03-18 NOTE — LETTER
[facility] CARE COORDINATION  [address]  [facility] [address]      March 18, 2021      Bonny Raymundo  [address]  [facility] [address]      Dear Bonny,    I am a clinic community health worker who works with Shivani Phelps PA-C at [facility]. I wanted to thank you for spending the time to talk with me.  Below is a description of clinic care coordination and how I can further assist you.      The clinic care coordination team is made up of a registered nurse,  and community health worker who understand the health care system. The goal of clinic care coordination is to help you manage your health and improve access to the health care system in the most efficient manner. The team can assist you in meeting your health care goals by providing education, coordinating services, strengthening the communication among your providers and supporting you with any resource needs.    Please feel free to contact me at [phone] with any questions or concerns. We are focused on providing you with the highest-quality healthcare experience possible and that all starts with you.     Sincerely,     JOSE Lewis  Clinic Care Coordination  [facility] Clinics: [place] [place], [place], [place], and [facility]  Phone: [phone]

## 2021-03-18 NOTE — PROGRESS NOTES
Clinic Care Coordination Contact  Community Health Worker Initial Outreach    CHW Initial Information Gathering:  Referral Source: ED Follow-Up    Chart Review: Referral from a discharge.  [facility] 3/17/21  ED for Leg Swelling  Medication Changes- Yes  Follow up with PCP    Preferred Hospital: [facility]  [phone]  No PCP office visit in Past Year: No  CHW Additional Questions  If ED/Hospital discharge, follow-up appointment scheduled as recommended?: No  Patient agreeable to assistance with scheduling?: Yes  CHW assisted patient to schedule (specify): CHW called the schedule line with patient.  Patient is scheduled with PCP on 3/24/21 at 2:00pm    Patient accepts CC: No, Not at this time, but would like information. Patient will be sent Care Coordination introduction letter for future reference.  Patient requested the letter to be mailed instead of sending via [employer].     Plan: Care Coordinator will send care coordination introduction letter with care coordinator contact information and explanation of care coordination services via mail. Care Coordinator will do no further outreaches at this time.    JOSE Lewis  Clinic Care Coordination  [facility] Clinics: [facility] [place], [place], [place], and [facility]  Phone: [phone]

## 2021-03-21 ENCOUNTER — HEALTH MAINTENANCE LETTER (OUTPATIENT)
Age: 76
End: 2021-03-21

## 2021-03-24 ENCOUNTER — OFFICE VISIT (OUTPATIENT)
Dept: FAMILY MEDICINE | Facility: CLINIC | Age: 76
End: 2021-03-24
Payer: COMMERCIAL

## 2021-03-24 VITALS
SYSTOLIC BLOOD PRESSURE: 112 MMHG | TEMPERATURE: 97 F | DIASTOLIC BLOOD PRESSURE: 73 MMHG | OXYGEN SATURATION: 98 % | BODY MASS INDEX: 22.32 KG/M2 | HEART RATE: 100 BPM | WEIGHT: 126 LBS | HEIGHT: 63 IN

## 2021-03-24 DIAGNOSIS — R22.0 SWELLING OF LEFT SIDE OF FACE: ICD-10-CM

## 2021-03-24 DIAGNOSIS — Z86.711 HISTORY OF PULMONARY EMBOLISM: ICD-10-CM

## 2021-03-24 DIAGNOSIS — Z86.718 ENCOUNTER FOR FOLLOW-UP OF DEEP VEIN THROMBOSIS (DVT) OF LEFT LOWER EXTREMITY: Primary | ICD-10-CM

## 2021-03-24 DIAGNOSIS — D61.9 APLASTIC ANEMIA (H): ICD-10-CM

## 2021-03-24 DIAGNOSIS — Z09 ENCOUNTER FOR FOLLOW-UP OF DEEP VEIN THROMBOSIS (DVT) OF LEFT LOWER EXTREMITY: Primary | ICD-10-CM

## 2021-03-24 PROBLEM — J44.9 COPD (CHRONIC OBSTRUCTIVE PULMONARY DISEASE) (H): Status: ACTIVE | Noted: 2021-03-24

## 2021-03-24 PROCEDURE — 99214 OFFICE O/P EST MOD 30 MIN: CPT | Performed by: PHYSICIAN ASSISTANT

## 2021-03-24 ASSESSMENT — MIFFLIN-ST. JEOR: SCORE: 1025.66

## 2021-03-24 NOTE — PROGRESS NOTES
"  Subjective   Bonny Raymundo is a 75 year old who presents for the following health issues: L leg DVT    Discharge summary as follows:    Medical Decision Making:  Bonny Raymundo is a 75 year old female who presents to the ED due to left leg swelling and states \" I think I have a DVT again\". She has had previous DVT's with PE and unfortunately she suffers form aplastic anemia of unknown cause and has elevated creatinine. On exam, she has quite and extensive swelling of her left leg going up to the thigh. Ultrasound confirmed DVT. I spoke with the patient about bringing her into the hospital but she was very adamant about wanting to go home. She asked if I would contact her hematologist at the  and I did speak with Dr. Guzmán. We decided that she would be an okay candidate for NOACS and I will start her on Apixaban 10 mg 2x a day for a week and then 5 mg x2  Day. I have highly recommended that she make a follow up appointment with her PMD in the next week and have warned her that if she develops new shortness of breath, chest pain or weakness to return to the ED. Again, I recommened inpatient treatment to start but she did not want this.        HPI     ED/UC Followup:    Facility:  [facility] Emergency Dept  Date of visit: 3/17/2021  Reason for visit: DVT  Current Status: Doing okay but leg still swollen. It's about half the size currently.      Pt has recurrent L leg DVT  The swelling has decreased and feels softer  Has f/u with her hematologist next month who sees her for aplastic anemia  She was discharged on Eliquis and today cuts dose from 10mg bid to 5mg bid today as directed.  She is compliant with wearing her compression stockings  Her cough has resolved    2. She has L facial swelling x one year that is unchanged  3. She has some SOB which is not new.  PMH includes COPD and anemia.  4. She is jaundiced and has elevated bili for the past several months  She is completely off of " tylenol    Osteoporosis: She went off of Prolia due to needing to have the tooth pulled    Past Medical History:   Diagnosis Date     Allergic rhinitis due to other allergen      Aplastic anemia (H) 2017     Closed anterior dislocation of humerus      DVT of lower extremity (deep venous thrombosis) (H) 10-12    Left after prolonged sitting-plane     DVT, recurrent, lower extremity, acute (H)      Headache(784.0)      Osteoporosis, unspecified      Pulmonary emboli (H) 10-12     Sensorineural hearing loss, unspecified      Sprain of ankle, unspecified site     L     Past Surgical History:   Procedure Laterality Date     ARTHRODESIS FOOT  11    Hallux valgus R-1st MP joint     BLEPHAROPLASTY BILATERAL  ,      BONE MARROW BIOPSY, BONE SPECIMEN, NEEDLE/TROCAR N/A 2016    Procedure: BIOPSY BONE MARROW;  Surgeon: Mu Morgan MD;  Location:  GI     BONE MARROW BIOPSY, BONE SPECIMEN, NEEDLE/TROCAR N/A 2016    Procedure: BIOPSY BONE MARROW;  Surgeon: Mu Morgan MD;  Location:  GI     C DEXA INTERPRETATION, AXIAL  03     CATARACT IOL, RT/LT Right      CATARACT IOL, RT/LT Left      COLONOSCOPY N/A 2018    Procedure: COLONOSCOPY;  colonoscopy;  Surgeon: Meliton Castrejon MD;  Location:  GI     EXCHANGE INTRAOCULAR LENS IMPLANT Right 2015    Procedure: EXCHANGE INTRAOCULAR LENS IMPLANT;  Surgeon: Garrett Dawson MD;  Location: [facility]     HC COLONOSCOPY THRU STOMA, DIAGNOSTIC      normal- minimal diverticulosis     PICC INSERTION Left 2017    5fr DL BioFlo PICC, 42cm (2cm external) in the L basilic vein w/ tip in the  SVC RA junction.     VITRECTOMY PARSPLANA WITH 23 GAUGE SYSTEM Right 2015    Procedure: VITRECTOMY PARSPLANA WITH 23 GAUGE SYSTEM;  Surgeon: Racheal Loyd MD;  Location:  EC     ZZC NONSPECIFIC PROCEDURE           ZZC NONSPECIFIC PROCEDURE      hysterectomy/BSO     ZZC NONSPECIFIC  "PROCEDURE      myomectomy (fibroids)     Social History     Tobacco Use     Smoking status: Former Smoker     Quit date: 1973     Years since quittin.8     Smokeless tobacco: Never Used   Substance Use Topics     Alcohol use: No     Alcohol/week: 0.0 standard drinks     Comment: occasionally     Current Outpatient Medications   Medication Sig Dispense Refill     apixaban ANTICOAGULANT (ELIQUIS) 5 MG tablet Take 1 tablet (5 mg) by mouth 2 times daily 60 tablet 0     COMPRESSION STOCKINGS Wear compression stockings at 20-30 mmHg rating most time during the day to the affected leg (left leg) or both legs. Take them off at night. 2 each 2     Cyanocobalamin (VITAMIN B-12 PO) Take 1 tablet by mouth daily       cycloSPORINE modified (GENERIC EQUIVALENT) 25 MG capsule Take 25 mg by mouth 2 times daily  540 capsule 2     diphenhydrAMINE (BENADRYL ALLERGY) 25 MG tablet Take 25 mg by mouth At Bedtime  56 tablet      eltrombopag (PROMACTA) 50 MG tablet 2.5 tablets daily (125 mg) Administer on an empty stomach, 1 hour before or 2 hours after a meal.       ORDER FOR DME Equipment being ordered: knee high compression stockings- 18-20 mm 1 Box 1     pantoprazole (PROTONIX) 20 MG EC tablet Take 1 tablet (20 mg) by mouth daily 90 tablet 11     Allergies   Allergen Reactions     Cats      Dogs      Seasonal Allergies      FAMILY HISTORY NOTED AND REVIEWED    PHYSICAL EXAM:    /73 (BP Location: Right arm, Cuff Size: Adult Regular)   Pulse 100   Temp 97  F (36.1  C)   Ht 1.6 m (5' 3\")   Wt 57.2 kg (126 lb)   SpO2 98%   Breastfeeding No   BMI 22.32 kg/m      Patient appears jaundiced  L cheek appears puffy compared to R.  No skin changes or masses in cheek  No tenderness to palp  No lesions in buccal mucosa.  Lungs: CTA bilat  Heart: RRR without m/r/g.  Extr: L leg larger than the right. No palp cord or erythema. Pt feels the leg swelling reduced by about 50% since ER     Assessment and Plan:     (M84,  K21.036) " Encounter for follow-up of deep vein thrombosis (DVT) of left lower extremity  (primary encounter diagnosis)  Comment:   Plan: Pt will need lifelong anticoagulation due to recurrent DVT and hx of PE. She has appt schedule for next month with her hematologist.     (Z86.323) History of pulmonary embolism  Comment:   Plan:     (D61.9) Aplastic anemia (H)  Comment:   Plan: per heme    (R22.0) Swelling of left side of face  Comment:   Plan: Uncertain of etiology. This has been unchanged for a year.      Shivani Phelps PA-C

## 2021-04-12 NOTE — RESULT ENCOUNTER NOTE
Daily Note     Today's date: 2021  Patient name: Jeb Reyes  : 1967  MRN: [id number]  Referring provider: Katlyn Roca MD  Dx:   Encounter Diagnosis     ICD-10-CM    1  Acute left-sided low back pain, unspecified whether sciatica present  M54 5                   Subjective: Pt reports no improvement in the last week, notes that her pain has been elevated since last session  Had iron and B12 infusion on Friday but feels no different  Pain slowly moving up L side back  Frustrated by progress  She has appt w/ Dr Raymond Silver on   Wants to continue w/ PT until then  Objective: no flex or ext bias, both motions equally painful  LAD on L w/ slight knee flexion provides relief  L LE elevated in supine, unable to assume positions for MET correction, pain throughout attempts  Assessment: Tolerated treatment poor  Patient demonstrated fatigue post treatment and would benefit from continued PT  Pt notes relief from LAD on L side, but overall no significant change by end of session  Sitting w/ lumbar support also helps, bit relief does not translate into relief in standing  Significant tension surrounding L SI joint but unable to perform METs s/t position intolerance  Will continue w/ gentle strengthening and pain relief work until MD visit  Pt (-) for all red flag questioning today  Plan: Continue per plan of care   standing posture correction work      Precautions: fibromyalgia, HTN       Date (Visit #) 3/22 (6) PN  (7)   (8)   3/18 (5)   Manual              DTM and TPR             Lumbar mobs           Lumbar traction     LAD on L side 3x2 min holds  x2 min  x2 min B        MET for L innonimate post rotation attempted x 2-3 mins     trialed                    Exercise Diary              Ther Ex        Active w/u      x10 min mid session lvl 2-3   pre 10 min lvl 3-4   HS/glute/piri/HF stretching B HS and piri x2-3 mins  No  L side HS and piri glute attempted x 2-3 mins    B HS and Please notify patient by sending following letter with copy of test results      Анна Bonny,    This is to inform you regarding your test result.    I am covering for Shivani Phelps.  Echocardiogram result is satisfactory .  You have slightly leaky aortic valve.  Follow up with Shivani Phelps.    Sincerely,      Dr.Nasima Kilo MD,FACP     piri x 5 min total   HS and piri w/ intermittent LAD x 5 mins    Mobility (LTR, open books, cat/cow) x10-15   LTR 2x10-15, s/l open books on L side attempted x 10-15  x10-15 total     Review of prone work  x10-15     CORY x2 mins, PPU x 5 (deferred)    Standing ext deferred    Rows/ext   Reflexes and red flag check x 2-3 mins Yellow tubing rows and ext 3x10 each    Hip stability   Self lumbar correction in sitting w/ cueing x 5 mins Review  Review     Mechanical traction 18 min up to 90 lbs intermittent  Mechanical traction x 17 min up to 93 lbs intermittent in supported hooklying       FOTO, ROM, HEP, MMT review x 5 mins  Review of HEP and POC, walked to ortho x 6-7 mins Review and update x 2-3 mins        Ad sq in hooklying and sitting x 3-4 mins        Self hip abd in sitting x3-4 mins              Neuro Re Ed        TrA progressions     isos x10-15  isos x 10-15   isos x 10, ad sq x 10   Bridge progressions     2x10 w/ ad sq   3x10 w/ ad sq   Squat progressions     review      Hip abd walking/monster walking            Balance    Yellow tubing pallof x 20 B     YTB sh ext w/ march 2x15         Review         Review and update x 2-3 mins         pball iso press x 10, w/ GTB hip abd and ext 2x10 each  pball iso press x20 total, w/ WS demo and mini march x20 - explanation of form x 5 mins         Review of HEP and POC x 3-4 mins   Ther Act             Stairs             Functional Transfers             Functional Lifting                                                                                                                                                           Modalities             heat  pre x 5 mins seated     pre seated x 6 mins       Ice              Mechanical Traction

## 2021-04-14 ENCOUNTER — INFUSION THERAPY VISIT (OUTPATIENT)
Dept: INFUSION THERAPY | Facility: CLINIC | Age: 76
End: 2021-04-14
Attending: INTERNAL MEDICINE
Payer: COMMERCIAL

## 2021-04-14 ENCOUNTER — HOSPITAL ENCOUNTER (OUTPATIENT)
Facility: CLINIC | Age: 76
Setting detail: SPECIMEN
Discharge: HOME | End: 2021-04-14
Attending: INTERNAL MEDICINE | Admitting: INTERNAL MEDICINE
Payer: COMMERCIAL

## 2021-04-14 DIAGNOSIS — D61.9 APLASTIC ANEMIA (H): ICD-10-CM

## 2021-04-14 DIAGNOSIS — N18.4 CKD (CHRONIC KIDNEY DISEASE) STAGE 4, GFR 15-29 ML/MIN (H): ICD-10-CM

## 2021-04-14 DIAGNOSIS — D61.9 APLASTIC ANEMIA (H): Primary | ICD-10-CM

## 2021-04-14 LAB
ACANTHOCYTES BLD QL SMEAR: SLIGHT
ALBUMIN SERPL-MCNC: 2.5 G/DL (ref 3.4–5)
ALP SERPL-CCNC: 97 U/L (ref 40–150)
ALT SERPL W P-5'-P-CCNC: 52 U/L (ref 0–50)
ANION GAP SERPL CALCULATED.3IONS-SCNC: 5 MMOL/L (ref 3–14)
ANISOCYTOSIS BLD QL SMEAR: ABNORMAL
AST SERPL W P-5'-P-CCNC: 70 U/L (ref 0–45)
BASOPHILS # BLD AUTO: 0 10E9/L (ref 0–0.2)
BASOPHILS NFR BLD AUTO: 0 %
BILIRUB DIRECT SERPL-MCNC: 1.3 MG/DL (ref 0–0.2)
BILIRUB SERPL-MCNC: 4.3 MG/DL (ref 0.2–1.3)
BUN SERPL-MCNC: 37 MG/DL (ref 7–30)
BURR CELLS BLD QL SMEAR: SLIGHT
CALCIUM SERPL-MCNC: 8.5 MG/DL (ref 8.5–10.1)
CHLORIDE SERPL-SCNC: 108 MMOL/L (ref 94–109)
CO2 SERPL-SCNC: 26 MMOL/L (ref 20–32)
CREAT SERPL-MCNC: 1.58 MG/DL (ref 0.52–1.04)
DIFFERENTIAL METHOD BLD: ABNORMAL
ELLIPTOCYTES BLD QL SMEAR: SLIGHT
EOSINOPHIL # BLD AUTO: 0 10E9/L (ref 0–0.7)
EOSINOPHIL NFR BLD AUTO: 1 %
ERYTHROCYTE [DISTWIDTH] IN BLOOD BY AUTOMATED COUNT: 27.4 % (ref 10–15)
GFR SERPL CREATININE-BSD FRML MDRD: 31 ML/MIN/{1.73_M2}
GLUCOSE SERPL-MCNC: 132 MG/DL (ref 70–99)
HCT VFR BLD AUTO: 26.3 % (ref 35–47)
HGB BLD-MCNC: 8.3 G/DL (ref 11.7–15.7)
LYMPHOCYTES # BLD AUTO: 2.1 10E9/L (ref 0.8–5.3)
LYMPHOCYTES NFR BLD AUTO: 59 %
MCH RBC QN AUTO: 29.9 PG (ref 26.5–33)
MCHC RBC AUTO-ENTMCNC: 31.6 G/DL (ref 31.5–36.5)
MCV RBC AUTO: 95 FL (ref 78–100)
MONOCYTES # BLD AUTO: 0.3 10E9/L (ref 0–1.3)
MONOCYTES NFR BLD AUTO: 8 %
NEUTROPHILS # BLD AUTO: 1.2 10E9/L (ref 1.6–8.3)
NEUTROPHILS NFR BLD AUTO: 32 %
NRBC # BLD AUTO: 2.2 10*3/UL
NRBC BLD AUTO-RTO: 60 /100
PLATELET # BLD AUTO: 124 10E9/L (ref 150–450)
PLATELET # BLD EST: ABNORMAL 10*3/UL
POTASSIUM SERPL-SCNC: 4 MMOL/L (ref 3.4–5.3)
PROT SERPL-MCNC: 7.3 G/DL (ref 6.8–8.8)
RBC # BLD AUTO: 2.78 10E12/L (ref 3.8–5.2)
RBC INCLUSIONS BLD: SLIGHT
RBC MORPH BLD: ABNORMAL
RETICS # AUTO: 89.8 10E9/L (ref 25–95)
RETICS/RBC NFR AUTO: 3.2 % (ref 0.5–2)
SMUDGE CELLS BLD QL SMEAR: PRESENT
SODIUM SERPL-SCNC: 139 MMOL/L (ref 133–144)
TARGETS BLD QL SMEAR: SLIGHT
WBC # BLD AUTO: 3.6 10E9/L (ref 4–11)

## 2021-04-14 PROCEDURE — 85045 AUTOMATED RETICULOCYTE COUNT: CPT | Performed by: INTERNAL MEDICINE

## 2021-04-14 PROCEDURE — 36415 COLL VENOUS BLD VENIPUNCTURE: CPT

## 2021-04-14 PROCEDURE — 82248 BILIRUBIN DIRECT: CPT | Performed by: INTERNAL MEDICINE

## 2021-04-14 PROCEDURE — 80053 COMPREHEN METABOLIC PANEL: CPT | Performed by: INTERNAL MEDICINE

## 2021-04-14 PROCEDURE — 85025 COMPLETE CBC W/AUTO DIFF WBC: CPT | Performed by: INTERNAL MEDICINE

## 2021-04-15 ENCOUNTER — VIRTUAL VISIT (OUTPATIENT)
Dept: TRANSPLANT | Facility: CLINIC | Age: 76
End: 2021-04-15
Attending: INTERNAL MEDICINE
Payer: COMMERCIAL

## 2021-04-15 DIAGNOSIS — Z11.59 ENCOUNTER FOR SCREENING FOR OTHER VIRAL DISEASES: ICD-10-CM

## 2021-04-15 DIAGNOSIS — I82.412 ACUTE DEEP VEIN THROMBOSIS (DVT) OF FEMORAL VEIN OF LEFT LOWER EXTREMITY (H): Primary | ICD-10-CM

## 2021-04-15 DIAGNOSIS — Z11.59 NEED FOR HEPATITIS B SCREENING TEST: ICD-10-CM

## 2021-04-15 DIAGNOSIS — D61.9 APLASTIC ANEMIA (H): ICD-10-CM

## 2021-04-15 DIAGNOSIS — N18.4 CKD (CHRONIC KIDNEY DISEASE) STAGE 4, GFR 15-29 ML/MIN (H): ICD-10-CM

## 2021-04-15 PROCEDURE — 99443 PR PHYSICIAN TELEPHONE EVALUATION 21-30 MIN: CPT | Mod: 95 | Performed by: INTERNAL MEDICINE

## 2021-04-15 NOTE — LETTER
"    4/15/2021         RE: Bonny Raymundo  [address] [facility] [address]      Bonny Raymundo is a 75 year old who is being evaluated via a billable telephone visit.      What phone number would you like to be contacted at? [phone]  How would you like to obtain your AVS? [place]     Vitals - Patient Reported  Weight (Patient Reported): 57.2 kg (126 lb)  Height (Patient Reported): 160 cm (5' 3\")  BMI (Based on Pt Reported Ht/Wt): 22.32  Pain Score: No Pain (0)    Marygamaliel ROSSRONY    Phone call duration: 25  minutes    This is a 25 minute telephone visit with Bonny to follow-up for aplastic anemia, her liver dysfunction and her chronic renal insufficiency.  In January she had some worsened dyspnea and tingling in her legs, seen in the emergency room at [facility] and ended up being admitted for transfusion but no specific diagnosis of her liver dysfunction was recognized.   In mid March she developed a left leg venous thrombosis and did not want hospitalization so she was started on oral apixaban 5 mg twice daily.  Note she had a clot and PE some years ago.  Since January she does not feel like her energy is up to par but she has had no bleeding bruising fever or chills.    She has no more numbness in her legs but does now have numbness around her lips.  She has no skin rash, mouth sores, pleuritic pain, cough or wheezing.  She has no palpitations.  She feels like she is eating well and has no bowel or bladder troubles.  She has no new bone or joint discomfort.      She tells me that in the past people thought that her face is mildly puffy on one side but not drooping.  She does not think her facial symmetry has changed.  Her vision is not disturbed and her eyelids are not drooping.  There were no other findings on this telephone review of systems.    Laboratory testing showed persisting minimal transaminase elevations but hyperbilirubinemia and normal alkaline phosphatase.  She remains mildly anemic " and thrombocytopenic.  Her chronic renal insufficiency, likely due to her low-dose cyclosporine therapy is unchanged.    The extant undiagnosed problems are primarily the liver disease and her chronic aplastic anemia.  We will reassess viral serologic studies to see if chronic hepatitis has developed but her medication will not change and she needs to continue her anticoagulation, still apixaban 5 mg twice daily.    She will get these lab measures done in the next 2 or 3 weeks and I will have another phone visit with her after.    She knows to call if new problems develop    Rafita Rogel MD    Professor of medicine    Results for LISSETH PARIKH (MRN [id number]) as of 4/15/2021 20:44   Ref. Range 3/17/2021 08:50 3/17/2021 09:59 4/14/2021 13:57   Sodium Latest Ref Range: 133 - 144 mmol/L   139   Potassium Latest Ref Range: 3.4 - 5.3 mmol/L   4.0   Chloride Latest Ref Range: 94 - 109 mmol/L   108   Carbon Dioxide Latest Ref Range: 20 - 32 mmol/L   26   Urea Nitrogen Latest Ref Range: 7 - 30 mg/dL   37 (H)   Creatinine Latest Ref Range: 0.52 - 1.04 mg/dL   1.58 (H)   GFR Estimate Latest Ref Range: >60 mL/min/1.73_m2   31 (L)   GFR Estimate If Black Latest Ref Range: >60 mL/min/1.73_m2   36 (L)   Calcium Latest Ref Range: 8.5 - 10.1 mg/dL   8.5   Anion Gap Latest Ref Range: 3 - 14 mmol/L   5   Albumin Latest Ref Range: 3.4 - 5.0 g/dL   2.5 (L)   Protein Total Latest Ref Range: 6.8 - 8.8 g/dL   7.3   Bilirubin Total Latest Ref Range: 0.2 - 1.3 mg/dL   4.3 (H)   Alkaline Phosphatase Latest Ref Range: 40 - 150 U/L   97   ALT Latest Ref Range: 0 - 50 U/L   52 (H)   AST Latest Ref Range: 0 - 45 U/L   70 (H)   Bilirubin Direct Latest Ref Range: 0.0 - 0.2 mg/dL   1.3 (H)   Glucose Latest Ref Range: 70 - 99 mg/dL   132 (H)   WBC Latest Ref Range: 4.0 - 11.0 10e9/L   3.6 (L)   Hemoglobin Latest Ref Range: 11.7 - 15.7 g/dL   8.3 (L)   Hematocrit Latest Ref Range: 35.0 - 47.0 %   26.3 (L)   Platelet Count Latest Ref  Range: 150 - 450 10e9/L   124 (L)   RBC Count Latest Ref Range: 3.8 - 5.2 10e12/L   2.78 (L)   MCV Latest Ref Range: 78 - 100 fl   95   MCH Latest Ref Range: 26.5 - 33.0 pg   29.9   MCHC Latest Ref Range: 31.5 - 36.5 g/dL   31.6   RDW Latest Ref Range: 10.0 - 15.0 %   27.4 (H)   Diff Method Unknown   Manual Differential   % Neutrophils Latest Units: %   32.0   % Lymphocytes Latest Units: %   59.0   % Monocytes Latest Units: %   8.0   % Eosinophils Latest Units: %   1.0   % Basophils Latest Units: %   0.0   Nucleated RBCs Latest Ref Range: 0 /100   60 (H)   Absolute Neutrophil Latest Ref Range: 1.6 - 8.3 10e9/L   1.2 (L)   Absolute Lymphocytes Latest Ref Range: 0.8 - 5.3 10e9/L   2.1   Absolute Monocytes Latest Ref Range: 0.0 - 1.3 10e9/L   0.3   Absolute Eosinophils Latest Ref Range: 0.0 - 0.7 10e9/L   0.0   Absolute Basophils Latest Ref Range: 0.0 - 0.2 10e9/L   0.0   Absolute Nucleated RBC Unknown   2.2   RBC Morphology Unknown   Consistent with reported results   Anisocytosis Unknown   Marked   RBC Fragments Unknown   Slight   Acanthocytes Unknown   Slight   Target Cells Unknown   Slight   Elliptocytes Unknown   Slight   Quang Cells Unknown   Slight   Smudge Cells Unknown   Present   Platelet Estimate Unknown   Automated count confirmed.  Platelet morphology is normal.   % Retic Latest Ref Range: 0.5 - 2.0 %   3.2 (H)   Absolute Retic Latest Ref Range: 25 - 95 10e9/L   89.8   XR CHEST 2 VW Unknown Rpt     US LOWER EXTREMITY VENOUS DUPLEX LEFT Unknown  Rpt          Rafita Rogel MD

## 2021-04-15 NOTE — LETTER
"    4/15/2021         RE: Bonny Raymundo  [address] [facility] [address]        Dear Colleague,    Thank you for referring your patient, Bonny Raymundo, to the [facility] [place]. Please see a copy of my visit note below.    Bonny Raymundo is a 75 year old who is being evaluated via a billable telephone visit.      What phone number would you like to be contacted at? [phone]  How would you like to obtain your AVS? Angelito     Vitals - Patient Reported  Weight (Patient Reported): 57.2 kg (126 lb)  Height (Patient Reported): 160 cm (5' 3\")  BMI (Based on Pt Reported Ht/Wt): 22.32  Pain Score: No Pain (0)    Mary POMPA    Phone call duration: 25  minutes    This is a 25 minute telephone visit with Bonny to follow-up for aplastic anemia, her liver dysfunction and her chronic renal insufficiency.  In January she had some worsened dyspnea and tingling in her legs, seen in the emergency room at [facility] and ended up being admitted for transfusion but no specific diagnosis of her liver dysfunction was recognized.   In mid March she developed a left leg venous thrombosis and did not want hospitalization so she was started on oral apixaban 5 mg twice daily.  Note she had a clot and PE some years ago.  Since January she does not feel like her energy is up to par but she has had no bleeding bruising fever or chills.    She has no more numbness in her legs but does now have numbness around her lips.  She has no skin rash, mouth sores, pleuritic pain, cough or wheezing.  She has no palpitations.  She feels like she is eating well and has no bowel or bladder troubles.  She has no new bone or joint discomfort.      She tells me that in the past people thought that her face is mildly puffy on one side but not drooping.  She does not think her facial symmetry has changed.  Her vision is not disturbed and her eyelids are not drooping.  There were no other findings " on this telephone review of systems.    Laboratory testing showed persisting minimal transaminase elevations but hyperbilirubinemia and normal alkaline phosphatase.  She remains mildly anemic and thrombocytopenic.  Her chronic renal insufficiency, likely due to her low-dose cyclosporine therapy is unchanged.    The extant undiagnosed problems are primarily the liver disease and her chronic aplastic anemia.  We will reassess viral serologic studies to see if chronic hepatitis has developed but her medication will not change and she needs to continue her anticoagulation, still apixaban 5 mg twice daily.    She will get these lab measures done in the next 2 or 3 weeks and I will have another phone visit with her after.    She knows to call if new problems develop    Rafita Rogel MD    Professor of medicine    Results for LISSETH PARIKH (MRN [id number]) as of 4/15/2021 20:44   Ref. Range 3/17/2021 08:50 3/17/2021 09:59 4/14/2021 13:57   Sodium Latest Ref Range: 133 - 144 mmol/L   139   Potassium Latest Ref Range: 3.4 - 5.3 mmol/L   4.0   Chloride Latest Ref Range: 94 - 109 mmol/L   108   Carbon Dioxide Latest Ref Range: 20 - 32 mmol/L   26   Urea Nitrogen Latest Ref Range: 7 - 30 mg/dL   37 (H)   Creatinine Latest Ref Range: 0.52 - 1.04 mg/dL   1.58 (H)   GFR Estimate Latest Ref Range: >60 mL/min/1.73_m2   31 (L)   GFR Estimate If Black Latest Ref Range: >60 mL/min/1.73_m2   36 (L)   Calcium Latest Ref Range: 8.5 - 10.1 mg/dL   8.5   Anion Gap Latest Ref Range: 3 - 14 mmol/L   5   Albumin Latest Ref Range: 3.4 - 5.0 g/dL   2.5 (L)   Protein Total Latest Ref Range: 6.8 - 8.8 g/dL   7.3   Bilirubin Total Latest Ref Range: 0.2 - 1.3 mg/dL   4.3 (H)   Alkaline Phosphatase Latest Ref Range: 40 - 150 U/L   97   ALT Latest Ref Range: 0 - 50 U/L   52 (H)   AST Latest Ref Range: 0 - 45 U/L   70 (H)   Bilirubin Direct Latest Ref Range: 0.0 - 0.2 mg/dL   1.3 (H)   Glucose Latest Ref Range: 70 - 99 mg/dL   132 (H)   WBC  Latest Ref Range: 4.0 - 11.0 10e9/L   3.6 (L)   Hemoglobin Latest Ref Range: 11.7 - 15.7 g/dL   8.3 (L)   Hematocrit Latest Ref Range: 35.0 - 47.0 %   26.3 (L)   Platelet Count Latest Ref Range: 150 - 450 10e9/L   124 (L)   RBC Count Latest Ref Range: 3.8 - 5.2 10e12/L   2.78 (L)   MCV Latest Ref Range: 78 - 100 fl   95   MCH Latest Ref Range: 26.5 - 33.0 pg   29.9   MCHC Latest Ref Range: 31.5 - 36.5 g/dL   31.6   RDW Latest Ref Range: 10.0 - 15.0 %   27.4 (H)   Diff Method Unknown   Manual Differential   % Neutrophils Latest Units: %   32.0   % Lymphocytes Latest Units: %   59.0   % Monocytes Latest Units: %   8.0   % Eosinophils Latest Units: %   1.0   % Basophils Latest Units: %   0.0   Nucleated RBCs Latest Ref Range: 0 /100   60 (H)   Absolute Neutrophil Latest Ref Range: 1.6 - 8.3 10e9/L   1.2 (L)   Absolute Lymphocytes Latest Ref Range: 0.8 - 5.3 10e9/L   2.1   Absolute Monocytes Latest Ref Range: 0.0 - 1.3 10e9/L   0.3   Absolute Eosinophils Latest Ref Range: 0.0 - 0.7 10e9/L   0.0   Absolute Basophils Latest Ref Range: 0.0 - 0.2 10e9/L   0.0   Absolute Nucleated RBC Unknown   2.2   RBC Morphology Unknown   Consistent with reported results   Anisocytosis Unknown   Marked   RBC Fragments Unknown   Slight   Acanthocytes Unknown   Slight   Target Cells Unknown   Slight   Elliptocytes Unknown   Slight   Quang Cells Unknown   Slight   Smudge Cells Unknown   Present   Platelet Estimate Unknown   Automated count confirmed.  Platelet morphology is normal.   % Retic Latest Ref Range: 0.5 - 2.0 %   3.2 (H)   Absolute Retic Latest Ref Range: 25 - 95 10e9/L   89.8   XR CHEST 2 VW Unknown Rpt     US LOWER EXTREMITY VENOUS DUPLEX LEFT Unknown  Rpt        Again, thank you for allowing me to participate in the care of your patient.        Sincerely,        Rafita Rogel MD

## 2021-04-15 NOTE — PROGRESS NOTES
"Bonny Raymundo is a 75 year old who is being evaluated via a billable telephone visit.      What phone number would you like to be contacted at? [phone]  How would you like to obtain your AVS? Angelito     Vitals - Patient Reported  Weight (Patient Reported): 57.2 kg (126 lb)  Height (Patient Reported): 160 cm (5' 3\")  BMI (Based on Pt Reported Ht/Wt): 22.32  Pain Score: No Pain (0)    Mary POMPA    Phone call duration: 25  minutes    This is a 25 minute telephone visit with Bonny to follow-up for aplastic anemia, her liver dysfunction and her chronic renal insufficiency.  In January she had some worsened dyspnea and tingling in her legs, seen in the emergency room at [facility] and ended up being admitted for transfusion but no specific diagnosis of her liver dysfunction was recognized.   In mid March she developed a left leg venous thrombosis and did not want hospitalization so she was started on oral apixaban 5 mg twice daily.  Note she had a clot and PE some years ago.  Since January she does not feel like her energy is up to par but she has had no bleeding bruising fever or chills.    She has no more numbness in her legs but does now have numbness around her lips.  She has no skin rash, mouth sores, pleuritic pain, cough or wheezing.  She has no palpitations.  She feels like she is eating well and has no bowel or bladder troubles.  She has no new bone or joint discomfort.      She tells me that in the past people thought that her face is mildly puffy on one side but not drooping.  She does not think her facial symmetry has changed.  Her vision is not disturbed and her eyelids are not drooping.  There were no other findings on this telephone review of systems.    Laboratory testing showed persisting minimal transaminase elevations but hyperbilirubinemia and normal alkaline phosphatase.  She remains mildly anemic and thrombocytopenic.  Her chronic renal insufficiency, likely due to her low-dose " cyclosporine therapy is unchanged.    The extant undiagnosed problems are primarily the liver disease and her chronic aplastic anemia.  We will reassess viral serologic studies to see if chronic hepatitis has developed but her medication will not change and she needs to continue her anticoagulation, still apixaban 5 mg twice daily.    She will get these lab measures done in the next 2 or 3 weeks and I will have another phone visit with her after.    She knows to call if new problems develop    Rafita Rogel MD    Professor of medicine    Results for LISSETH PARIKH (MRN [id number]) as of 4/15/2021 20:44   Ref. Range 3/17/2021 08:50 3/17/2021 09:59 4/14/2021 13:57   Sodium Latest Ref Range: 133 - 144 mmol/L   139   Potassium Latest Ref Range: 3.4 - 5.3 mmol/L   4.0   Chloride Latest Ref Range: 94 - 109 mmol/L   108   Carbon Dioxide Latest Ref Range: 20 - 32 mmol/L   26   Urea Nitrogen Latest Ref Range: 7 - 30 mg/dL   37 (H)   Creatinine Latest Ref Range: 0.52 - 1.04 mg/dL   1.58 (H)   GFR Estimate Latest Ref Range: >60 mL/min/1.73_m2   31 (L)   GFR Estimate If Black Latest Ref Range: >60 mL/min/1.73_m2   36 (L)   Calcium Latest Ref Range: 8.5 - 10.1 mg/dL   8.5   Anion Gap Latest Ref Range: 3 - 14 mmol/L   5   Albumin Latest Ref Range: 3.4 - 5.0 g/dL   2.5 (L)   Protein Total Latest Ref Range: 6.8 - 8.8 g/dL   7.3   Bilirubin Total Latest Ref Range: 0.2 - 1.3 mg/dL   4.3 (H)   Alkaline Phosphatase Latest Ref Range: 40 - 150 U/L   97   ALT Latest Ref Range: 0 - 50 U/L   52 (H)   AST Latest Ref Range: 0 - 45 U/L   70 (H)   Bilirubin Direct Latest Ref Range: 0.0 - 0.2 mg/dL   1.3 (H)   Glucose Latest Ref Range: 70 - 99 mg/dL   132 (H)   WBC Latest Ref Range: 4.0 - 11.0 10e9/L   3.6 (L)   Hemoglobin Latest Ref Range: 11.7 - 15.7 g/dL   8.3 (L)   Hematocrit Latest Ref Range: 35.0 - 47.0 %   26.3 (L)   Platelet Count Latest Ref Range: 150 - 450 10e9/L   124 (L)   RBC Count Latest Ref Range: 3.8 - 5.2 10e12/L    2.78 (L)   MCV Latest Ref Range: 78 - 100 fl   95   MCH Latest Ref Range: 26.5 - 33.0 pg   29.9   MCHC Latest Ref Range: 31.5 - 36.5 g/dL   31.6   RDW Latest Ref Range: 10.0 - 15.0 %   27.4 (H)   Diff Method Unknown   Manual Differential   % Neutrophils Latest Units: %   32.0   % Lymphocytes Latest Units: %   59.0   % Monocytes Latest Units: %   8.0   % Eosinophils Latest Units: %   1.0   % Basophils Latest Units: %   0.0   Nucleated RBCs Latest Ref Range: 0 /100   60 (H)   Absolute Neutrophil Latest Ref Range: 1.6 - 8.3 10e9/L   1.2 (L)   Absolute Lymphocytes Latest Ref Range: 0.8 - 5.3 10e9/L   2.1   Absolute Monocytes Latest Ref Range: 0.0 - 1.3 10e9/L   0.3   Absolute Eosinophils Latest Ref Range: 0.0 - 0.7 10e9/L   0.0   Absolute Basophils Latest Ref Range: 0.0 - 0.2 10e9/L   0.0   Absolute Nucleated RBC Unknown   2.2   RBC Morphology Unknown   Consistent with reported results   Anisocytosis Unknown   Marked   RBC Fragments Unknown   Slight   Acanthocytes Unknown   Slight   Target Cells Unknown   Slight   Elliptocytes Unknown   Slight   [place] Cells Unknown   Slight   Smudge Cells Unknown   Present   Platelet Estimate Unknown   Automated count confirmed.  Platelet morphology is normal.   % Retic Latest Ref Range: 0.5 - 2.0 %   3.2 (H)   Absolute Retic Latest Ref Range: 25 - 95 10e9/L   89.8   XR CHEST 2 VW Unknown Rpt     US LOWER EXTREMITY VENOUS DUPLEX LEFT Unknown  Rpt

## 2021-05-05 ENCOUNTER — HOSPITAL ENCOUNTER (OUTPATIENT)
Facility: CLINIC | Age: 76
Setting detail: SPECIMEN
Discharge: HOME | End: 2021-05-05
Attending: INTERNAL MEDICINE | Admitting: INTERNAL MEDICINE
Payer: COMMERCIAL

## 2021-05-05 ENCOUNTER — INFUSION THERAPY VISIT (OUTPATIENT)
Dept: INFUSION THERAPY | Facility: CLINIC | Age: 76
End: 2021-05-05
Attending: INTERNAL MEDICINE
Payer: COMMERCIAL

## 2021-05-05 DIAGNOSIS — I82.412 ACUTE DEEP VEIN THROMBOSIS (DVT) OF FEMORAL VEIN OF LEFT LOWER EXTREMITY (H): ICD-10-CM

## 2021-05-05 DIAGNOSIS — D61.9 APLASTIC ANEMIA (H): ICD-10-CM

## 2021-05-05 DIAGNOSIS — Z11.59 ENCOUNTER FOR SCREENING FOR OTHER VIRAL DISEASES: ICD-10-CM

## 2021-05-05 DIAGNOSIS — N18.4 CKD (CHRONIC KIDNEY DISEASE) STAGE 4, GFR 15-29 ML/MIN (H): ICD-10-CM

## 2021-05-05 DIAGNOSIS — Z11.59 NEED FOR HEPATITIS B SCREENING TEST: ICD-10-CM

## 2021-05-05 LAB
ACANTHOCYTES BLD QL SMEAR: SLIGHT
ALBUMIN SERPL-MCNC: 2.5 G/DL (ref 3.4–5)
ALP SERPL-CCNC: 83 U/L (ref 40–150)
ALT SERPL W P-5'-P-CCNC: 46 U/L (ref 0–50)
ANION GAP SERPL CALCULATED.3IONS-SCNC: 4 MMOL/L (ref 3–14)
ANISOCYTOSIS BLD QL SMEAR: ABNORMAL
AST SERPL W P-5'-P-CCNC: 42 U/L (ref 0–45)
BASOPHILS # BLD AUTO: 0 10E9/L (ref 0–0.2)
BASOPHILS NFR BLD AUTO: 0 %
BILIRUB SERPL-MCNC: 3.7 MG/DL (ref 0.2–1.3)
BUN SERPL-MCNC: 30 MG/DL (ref 7–30)
CALCIUM SERPL-MCNC: 8 MG/DL (ref 8.5–10.1)
CHLORIDE SERPL-SCNC: 107 MMOL/L (ref 94–109)
CO2 SERPL-SCNC: 27 MMOL/L (ref 20–32)
CREAT SERPL-MCNC: 1.71 MG/DL (ref 0.52–1.04)
DACRYOCYTES BLD QL SMEAR: SLIGHT
DIFFERENTIAL METHOD BLD: ABNORMAL
ELLIPTOCYTES BLD QL SMEAR: SLIGHT
EOSINOPHIL # BLD AUTO: 0.1 10E9/L (ref 0–0.7)
EOSINOPHIL NFR BLD AUTO: 3 %
ERYTHROCYTE [DISTWIDTH] IN BLOOD BY AUTOMATED COUNT: 25.2 % (ref 10–15)
FERRITIN SERPL-MCNC: 33 NG/ML (ref 8–252)
GFR SERPL CREATININE-BSD FRML MDRD: 28 ML/MIN/{1.73_M2}
GLUCOSE SERPL-MCNC: 112 MG/DL (ref 70–99)
HCT VFR BLD AUTO: 25.9 % (ref 35–47)
HGB BLD-MCNC: 7.9 G/DL (ref 11.7–15.7)
INR PPP: 2.02 (ref 0.86–1.14)
LDH SERPL L TO P-CCNC: 287 U/L (ref 81–234)
LYMPHOCYTES # BLD AUTO: 1.3 10E9/L (ref 0.8–5.3)
LYMPHOCYTES NFR BLD AUTO: 41 %
MCH RBC QN AUTO: 30.4 PG (ref 26.5–33)
MCHC RBC AUTO-ENTMCNC: 30.5 G/DL (ref 31.5–36.5)
MCV RBC AUTO: 100 FL (ref 78–100)
MONOCYTES # BLD AUTO: 0.7 10E9/L (ref 0–1.3)
MONOCYTES NFR BLD AUTO: 21 %
NEUTROPHILS # BLD AUTO: 1.1 10E9/L (ref 1.6–8.3)
NEUTROPHILS NFR BLD AUTO: 35 %
NRBC # BLD AUTO: 0.7 10*3/UL
NRBC BLD AUTO-RTO: 23 /100
PLATELET # BLD AUTO: 149 10E9/L (ref 150–450)
PLATELET # BLD EST: ABNORMAL 10*3/UL
POTASSIUM SERPL-SCNC: 4 MMOL/L (ref 3.4–5.3)
PROT SERPL-MCNC: 7.1 G/DL (ref 6.8–8.8)
RBC # BLD AUTO: 2.6 10E12/L (ref 3.8–5.2)
RBC INCLUSIONS BLD: SLIGHT
RETICS # AUTO: 84 10E9/L (ref 25–95)
RETICS/RBC NFR AUTO: 3.2 % (ref 0.5–2)
SMUDGE CELLS BLD QL SMEAR: PRESENT
SODIUM SERPL-SCNC: 138 MMOL/L (ref 133–144)
TARGETS BLD QL SMEAR: SLIGHT
WBC # BLD AUTO: 3.2 10E9/L (ref 4–11)

## 2021-05-05 PROCEDURE — 83615 LACTATE (LD) (LDH) ENZYME: CPT | Performed by: INTERNAL MEDICINE

## 2021-05-05 PROCEDURE — 87340 HEPATITIS B SURFACE AG IA: CPT | Performed by: INTERNAL MEDICINE

## 2021-05-05 PROCEDURE — 85045 AUTOMATED RETICULOCYTE COUNT: CPT | Performed by: INTERNAL MEDICINE

## 2021-05-05 PROCEDURE — 85610 PROTHROMBIN TIME: CPT | Performed by: INTERNAL MEDICINE

## 2021-05-05 PROCEDURE — 85025 COMPLETE CBC W/AUTO DIFF WBC: CPT | Performed by: INTERNAL MEDICINE

## 2021-05-05 PROCEDURE — 82728 ASSAY OF FERRITIN: CPT | Performed by: INTERNAL MEDICINE

## 2021-05-05 PROCEDURE — 86803 HEPATITIS C AB TEST: CPT | Performed by: INTERNAL MEDICINE

## 2021-05-05 PROCEDURE — 80053 COMPREHEN METABOLIC PANEL: CPT | Performed by: INTERNAL MEDICINE

## 2021-05-05 PROCEDURE — 36415 COLL VENOUS BLD VENIPUNCTURE: CPT

## 2021-05-05 PROCEDURE — 86706 HEP B SURFACE ANTIBODY: CPT | Performed by: INTERNAL MEDICINE

## 2021-05-05 PROCEDURE — 87522 HEPATITIS C REVRS TRNSCRPJ: CPT | Performed by: INTERNAL MEDICINE

## 2021-05-05 PROCEDURE — 87799 DETECT AGENT NOS DNA QUANT: CPT | Performed by: INTERNAL MEDICINE

## 2021-05-06 ENCOUNTER — VIRTUAL VISIT (OUTPATIENT)
Dept: TRANSPLANT | Facility: CLINIC | Age: 76
End: 2021-05-06
Attending: INTERNAL MEDICINE
Payer: COMMERCIAL

## 2021-05-06 DIAGNOSIS — D61.818 PANCYTOPENIA (H): ICD-10-CM

## 2021-05-06 DIAGNOSIS — N18.4 CKD (CHRONIC KIDNEY DISEASE) STAGE 4, GFR 15-29 ML/MIN (H): ICD-10-CM

## 2021-05-06 DIAGNOSIS — D61.3 IDIOPATHIC APLASTIC ANEMIA (H): ICD-10-CM

## 2021-05-06 DIAGNOSIS — D61.9 APLASTIC ANEMIA (H): ICD-10-CM

## 2021-05-06 LAB
HBV SURFACE AB SERPL IA-ACNC: 0.69 M[IU]/ML
HBV SURFACE AG SERPL QL IA: NONREACTIVE
HCV AB SERPL QL IA: NONREACTIVE
HCV RNA SERPL NAA+PROBE-ACNC: NORMAL [IU]/ML
HCV RNA SERPL NAA+PROBE-LOG IU: NORMAL LOG IU/ML

## 2021-05-06 PROCEDURE — 99442 PR PHYSICIAN TELEPHONE EVALUATION 11-20 MIN: CPT | Performed by: INTERNAL MEDICINE

## 2021-05-06 RX ORDER — CYCLOSPORINE 25 MG/1
25 CAPSULE, LIQUID FILLED ORAL DAILY
Qty: 540 CAPSULE | Refills: 2 | COMMUNITY
Start: 2021-05-06 | End: 2021-05-27

## 2021-05-06 NOTE — PROGRESS NOTES
Lisseth Raymundo is a 75 year old who is being evaluated via a billable telephone visit.      What phone number would you like to be contacted at? [phone]  How would you like to obtain your AVS? Mail a copy     PEÑA Dill    Phone call duration: 15  minutes    Lisseth had another visit to follow-up her aplastic anemia.  She still tired but not necessarily winded.  It is worse in the morning but she tries to work in her yard and gets tired and needs to go rest a bit.  It sometimes leads to some back aching and she is taking an occasional Tylenol but not a lot;  with some relief.  Her appetite has been down a bit but she does not think she has lost weight.  She has a persisting nonproductive cough that is worse when she is laying down; is not associated with hemoptysis or pleuritic symptoms.  She has no skin rash.    Laboratory testing showed a little worsened anemia and a little worsening of her renal function but maintained white count and platelets.  Her liver function has now returned to normal.  Viral serology testing was all negative for hepatitis B and C today.    Because of the elevation of her creatinine will cut her cyclosporine to 25 mg once daily and continue the eltrombopag.  We will recheck her counts in 3 weeks and if her cough is persistent I told her the visit should be in person not virtual.    She knows to call if additional problems arise    Rafita Rogel MD    Professor of medicine    Results for LISSETH RAYMUNDO (MRN [id number]) as of 5/7/2021 14:21   Ref. Range 4/14/2021 13:57 5/5/2021 14:32   Sodium Latest Ref Range: 133 - 144 mmol/L 139 138   Potassium Latest Ref Range: 3.4 - 5.3 mmol/L 4.0 4.0   Chloride Latest Ref Range: 94 - 109 mmol/L 108 107   Carbon Dioxide Latest Ref Range: 20 - 32 mmol/L 26 27   Urea Nitrogen Latest Ref Range: 7 - 30 mg/dL 37 (H) 30   Creatinine Latest Ref Range: 0.52 - 1.04 mg/dL 1.58 (H) 1.71 (H)   GFR Estimate Latest Ref Range: >60 mL/min/1.73_m2 31  (L) 28 (L)   GFR Estimate If Black Latest Ref Range: >60 mL/min/1.73_m2 36 (L) 33 (L)   Calcium Latest Ref Range: 8.5 - 10.1 mg/dL 8.5 8.0 (L)   Anion Gap Latest Ref Range: 3 - 14 mmol/L 5 4   Albumin Latest Ref Range: 3.4 - 5.0 g/dL 2.5 (L) 2.5 (L)   Protein Total Latest Ref Range: 6.8 - 8.8 g/dL 7.3 7.1   Bilirubin Total Latest Ref Range: 0.2 - 1.3 mg/dL 4.3 (H) 3.7 (H)   Alkaline Phosphatase Latest Ref Range: 40 - 150 U/L 97 83   ALT Latest Ref Range: 0 - 50 U/L 52 (H) 46   AST Latest Ref Range: 0 - 45 U/L 70 (H) 42   Bilirubin Direct Latest Ref Range: 0.0 - 0.2 mg/dL 1.3 (H)    Ferritin Latest Ref Range: 8 - 252 ng/mL  33   Lactate Dehydrogenase Latest Ref Range: 81 - 234 U/L  287 (H)   Glucose Latest Ref Range: 70 - 99 mg/dL 132 (H) 112 (H)   WBC Latest Ref Range: 4.0 - 11.0 10e9/L 3.6 (L) 3.2 (L)   Hemoglobin Latest Ref Range: 11.7 - 15.7 g/dL 8.3 (L) 7.9 (L)   Hematocrit Latest Ref Range: 35.0 - 47.0 % 26.3 (L) 25.9 (L)   Platelet Count Latest Ref Range: 150 - 450 10e9/L 124 (L) 149 (L)   RBC Count Latest Ref Range: 3.8 - 5.2 10e12/L 2.78 (L) 2.60 (L)   MCV Latest Ref Range: 78 - 100 fl 95 100   MCH Latest Ref Range: 26.5 - 33.0 pg 29.9 30.4   MCHC Latest Ref Range: 31.5 - 36.5 g/dL 31.6 30.5 (L)   RDW Latest Ref Range: 10.0 - 15.0 % 27.4 (H) 25.2 (H)   Diff Method Unknown Manual Differential Manual Differential   % Neutrophils Latest Units: % 32.0 35.0   % Lymphocytes Latest Units: % 59.0 41.0   % Monocytes Latest Units: % 8.0 21.0   % Eosinophils Latest Units: % 1.0 3.0   % Basophils Latest Units: % 0.0 0.0   Nucleated RBCs Latest Ref Range: 0 /100 60 (H) 23 (H)   Absolute Neutrophil Latest Ref Range: 1.6 - 8.3 10e9/L 1.2 (L) 1.1 (L)   Absolute Lymphocytes Latest Ref Range: 0.8 - 5.3 10e9/L 2.1 1.3   Absolute Monocytes Latest Ref Range: 0.0 - 1.3 10e9/L 0.3 0.7   Absolute Eosinophils Latest Ref Range: 0.0 - 0.7 10e9/L 0.0 0.1   Absolute Basophils Latest Ref Range: 0.0 - 0.2 10e9/L 0.0 0.0   Absolute  Nucleated RBC Unknown 2.2 0.7   RBC Morphology Unknown Consistent with reported results    Anisocytosis Unknown Marked Marked   RBC Fragments Unknown Slight Slight   Teardrop Cells Unknown  Slight   Acanthocytes Unknown Slight Slight   Target Cells Unknown Slight Slight   Elliptocytes Unknown Slight Slight   [place] Cells Unknown Slight    Smudge Cells Unknown Present Present   Platelet Estimate Unknown Automated count confirmed.  Platelet morphology is normal. Automated count confirmed.  Giant platelets are present.   % Retic Latest Ref Range: 0.5 - 2.0 % 3.2 (H) 3.2 (H)   Absolute Retic Latest Ref Range: 25 - 95 10e9/L 89.8 84.0   INR Latest Ref Range: 0.86 - 1.14   2.02 (H)   Hep B Surface Agn Latest Ref Range: NR^Nonreactive   Nonreactive   Hepatitis B Surface Antibody Latest Ref Range: <8.00 m[IU]/mL  0.69   Hepatitis C Antibody Latest Ref Range: NR^Nonreactive   Nonreactive   HCV RNA Quant IU/ml Latest Ref Range: HCVND^HCV RNA Not Detected [IU]/mL  HCV RNA Not Detected   Log of HCV RNA Qt Latest Ref Range: <1.2 Log IU/mL  Not Calculated

## 2021-05-06 NOTE — LETTER
5/6/2021         RE: Bonny Parikh  [address]  [facility] [address]        Dear Colleague,    Thank you for referring your patient, Bonny Parikh, to the [facility] [place]. Please see a copy of my visit note below.    Bonny Parikh is a 75 year old who is being evaluated via a billable telephone visit.      What phone number would you like to be contacted at? [phone]  How would you like to obtain your AVS? Mail a copy     PEÑA Dill    Phone call duration: 15  minutes    Bonny had another visit to follow-up her aplastic anemia.  She still tired but not necessarily winded.  It is worse in the morning but she tries to work in her yard and gets tired and needs to go rest a bit.  It sometimes leads to some back aching and she is taking an occasional Tylenol but not a lot;  with some relief.  Her appetite has been down a bit but she does not think she has lost weight.  She has a persisting nonproductive cough that is worse when she is laying down; is not associated with hemoptysis or pleuritic symptoms.  She has no skin rash.    Laboratory testing showed a little worsened anemia and a little worsening of her renal function but maintained white count and platelets.  Her liver function has now returned to normal.  Viral serology testing was all negative for hepatitis B and C today.    Because of the elevation of her creatinine will cut her cyclosporine to 25 mg once daily and continue the eltrombopag.  We will recheck her counts in 3 weeks and if her cough is persistent I told her the visit should be in person not virtual.    She knows to call if additional problems arise    Rafita Rogel MD    Professor of medicine    Results for BONNY PARIKH (MRN [id number]) as of 5/7/2021 14:21   Ref. Range 4/14/2021 13:57 5/5/2021 14:32   Sodium Latest Ref Range: 133 - 144 mmol/L 139 138   Potassium Latest Ref Range: 3.4 - 5.3 mmol/L 4.0 4.0    Chloride Latest Ref Range: 94 - 109 mmol/L 108 107   Carbon Dioxide Latest Ref Range: 20 - 32 mmol/L 26 27   Urea Nitrogen Latest Ref Range: 7 - 30 mg/dL 37 (H) 30   Creatinine Latest Ref Range: 0.52 - 1.04 mg/dL 1.58 (H) 1.71 (H)   GFR Estimate Latest Ref Range: >60 mL/min/1.73_m2 31 (L) 28 (L)   GFR Estimate If Black Latest Ref Range: >60 mL/min/1.73_m2 36 (L) 33 (L)   Calcium Latest Ref Range: 8.5 - 10.1 mg/dL 8.5 8.0 (L)   Anion Gap Latest Ref Range: 3 - 14 mmol/L 5 4   Albumin Latest Ref Range: 3.4 - 5.0 g/dL 2.5 (L) 2.5 (L)   Protein Total Latest Ref Range: 6.8 - 8.8 g/dL 7.3 7.1   Bilirubin Total Latest Ref Range: 0.2 - 1.3 mg/dL 4.3 (H) 3.7 (H)   Alkaline Phosphatase Latest Ref Range: 40 - 150 U/L 97 83   ALT Latest Ref Range: 0 - 50 U/L 52 (H) 46   AST Latest Ref Range: 0 - 45 U/L 70 (H) 42   Bilirubin Direct Latest Ref Range: 0.0 - 0.2 mg/dL 1.3 (H)    Ferritin Latest Ref Range: 8 - 252 ng/mL  33   Lactate Dehydrogenase Latest Ref Range: 81 - 234 U/L  287 (H)   Glucose Latest Ref Range: 70 - 99 mg/dL 132 (H) 112 (H)   WBC Latest Ref Range: 4.0 - 11.0 10e9/L 3.6 (L) 3.2 (L)   Hemoglobin Latest Ref Range: 11.7 - 15.7 g/dL 8.3 (L) 7.9 (L)   Hematocrit Latest Ref Range: 35.0 - 47.0 % 26.3 (L) 25.9 (L)   Platelet Count Latest Ref Range: 150 - 450 10e9/L 124 (L) 149 (L)   RBC Count Latest Ref Range: 3.8 - 5.2 10e12/L 2.78 (L) 2.60 (L)   MCV Latest Ref Range: 78 - 100 fl 95 100   MCH Latest Ref Range: 26.5 - 33.0 pg 29.9 30.4   MCHC Latest Ref Range: 31.5 - 36.5 g/dL 31.6 30.5 (L)   RDW Latest Ref Range: 10.0 - 15.0 % 27.4 (H) 25.2 (H)   Diff Method Unknown Manual Differential Manual Differential   % Neutrophils Latest Units: % 32.0 35.0   % Lymphocytes Latest Units: % 59.0 41.0   % Monocytes Latest Units: % 8.0 21.0   % Eosinophils Latest Units: % 1.0 3.0   % Basophils Latest Units: % 0.0 0.0   Nucleated RBCs Latest Ref Range: 0 /100 60 (H) 23 (H)   Absolute Neutrophil Latest Ref Range: 1.6 - 8.3 10e9/L 1.2  (L) 1.1 (L)   Absolute Lymphocytes Latest Ref Range: 0.8 - 5.3 10e9/L 2.1 1.3   Absolute Monocytes Latest Ref Range: 0.0 - 1.3 10e9/L 0.3 0.7   Absolute Eosinophils Latest Ref Range: 0.0 - 0.7 10e9/L 0.0 0.1   Absolute Basophils Latest Ref Range: 0.0 - 0.2 10e9/L 0.0 0.0   Absolute Nucleated RBC Unknown 2.2 0.7   RBC Morphology Unknown Consistent with reported results    Anisocytosis Unknown Marked Marked   RBC Fragments Unknown Slight Slight   [place] Cells Unknown  Slight   Acanthocytes Unknown Slight Slight   Target Cells Unknown Slight Slight   Elliptocytes Unknown Slight Slight   [place] Cells Unknown Slight    Smudge Cells Unknown Present Present   Platelet Estimate Unknown Automated count confirmed.  Platelet morphology is normal. Automated count confirmed.  Giant platelets are present.   % Retic Latest Ref Range: 0.5 - 2.0 % 3.2 (H) 3.2 (H)   Absolute Retic Latest Ref Range: 25 - 95 10e9/L 89.8 84.0   INR Latest Ref Range: 0.86 - 1.14   2.02 (H)   Hep B Surface Agn Latest Ref Range: NR^Nonreactive   Nonreactive   Hepatitis B Surface Antibody Latest Ref Range: <8.00 m[IU]/mL  0.69   Hepatitis C Antibody Latest Ref Range: NR^Nonreactive   Nonreactive   HCV RNA Quant IU/ml Latest Ref Range: HCVND^HCV RNA Not Detected [IU]/mL  HCV RNA Not Detected   Log of HCV RNA Qt Latest Ref Range: <1.2 Log IU/mL  Not Calculated       Again, thank you for allowing me to participate in the care of your patient.        Sincerely,        Rafita Rogel MD

## 2021-05-06 NOTE — LETTER
5/6/2021         RE: Bonny Parikh  [address]  [facility] [address]      Bonny Parikh is a 75 year old who is being evaluated via a billable telephone visit.      What phone number would you like to be contacted at? [phone]  How would you like to obtain your AVS? Mail a copy     PEÑA Dill    Phone call duration: 15  minutes    Bonny had another visit to follow-up her aplastic anemia.  She still tired but not necessarily winded.  It is worse in the morning but she tries to work in her yard and gets tired and needs to go rest a bit.  It sometimes leads to some back aching and she is taking an occasional Tylenol but not a lot;  with some relief.  Her appetite has been down a bit but she does not think she has lost weight.  She has a persisting nonproductive cough that is worse when she is laying down; is not associated with hemoptysis or pleuritic symptoms.  She has no skin rash.    Laboratory testing showed a little worsened anemia and a little worsening of her renal function but maintained white count and platelets.  Her liver function has now returned to normal.  Viral serology testing was all negative for hepatitis B and C today.    Because of the elevation of her creatinine will cut her cyclosporine to 25 mg once daily and continue the eltrombopag.  We will recheck her counts in 3 weeks and if her cough is persistent I told her the visit should be in person not virtual.    She knows to call if additional problems arise    Rafita Rogel MD    Professor of medicine    Results for BONYN PARIKH (MRN [id number]) as of 5/7/2021 14:21   Ref. Range 4/14/2021 13:57 5/5/2021 14:32   Sodium Latest Ref Range: 133 - 144 mmol/L 139 138   Potassium Latest Ref Range: 3.4 - 5.3 mmol/L 4.0 4.0   Chloride Latest Ref Range: 94 - 109 mmol/L 108 107   Carbon Dioxide Latest Ref Range: 20 - 32 mmol/L 26 27   Urea Nitrogen Latest Ref Range: 7 - 30 mg/dL 37 (H) 30   Creatinine Latest Ref Range:  0.52 - 1.04 mg/dL 1.58 (H) 1.71 (H)   GFR Estimate Latest Ref Range: >60 mL/min/1.73_m2 31 (L) 28 (L)   GFR Estimate If Black Latest Ref Range: >60 mL/min/1.73_m2 36 (L) 33 (L)   Calcium Latest Ref Range: 8.5 - 10.1 mg/dL 8.5 8.0 (L)   Anion Gap Latest Ref Range: 3 - 14 mmol/L 5 4   Albumin Latest Ref Range: 3.4 - 5.0 g/dL 2.5 (L) 2.5 (L)   Protein Total Latest Ref Range: 6.8 - 8.8 g/dL 7.3 7.1   Bilirubin Total Latest Ref Range: 0.2 - 1.3 mg/dL 4.3 (H) 3.7 (H)   Alkaline Phosphatase Latest Ref Range: 40 - 150 U/L 97 83   ALT Latest Ref Range: 0 - 50 U/L 52 (H) 46   AST Latest Ref Range: 0 - 45 U/L 70 (H) 42   Bilirubin Direct Latest Ref Range: 0.0 - 0.2 mg/dL 1.3 (H)    Ferritin Latest Ref Range: 8 - 252 ng/mL  33   Lactate Dehydrogenase Latest Ref Range: 81 - 234 U/L  287 (H)   Glucose Latest Ref Range: 70 - 99 mg/dL 132 (H) 112 (H)   WBC Latest Ref Range: 4.0 - 11.0 10e9/L 3.6 (L) 3.2 (L)   Hemoglobin Latest Ref Range: 11.7 - 15.7 g/dL 8.3 (L) 7.9 (L)   Hematocrit Latest Ref Range: 35.0 - 47.0 % 26.3 (L) 25.9 (L)   Platelet Count Latest Ref Range: 150 - 450 10e9/L 124 (L) 149 (L)   RBC Count Latest Ref Range: 3.8 - 5.2 10e12/L 2.78 (L) 2.60 (L)   MCV Latest Ref Range: 78 - 100 fl 95 100   MCH Latest Ref Range: 26.5 - 33.0 pg 29.9 30.4   MCHC Latest Ref Range: 31.5 - 36.5 g/dL 31.6 30.5 (L)   RDW Latest Ref Range: 10.0 - 15.0 % 27.4 (H) 25.2 (H)   Diff Method Unknown Manual Differential Manual Differential   % Neutrophils Latest Units: % 32.0 35.0   % Lymphocytes Latest Units: % 59.0 41.0   % Monocytes Latest Units: % 8.0 21.0   % Eosinophils Latest Units: % 1.0 3.0   % Basophils Latest Units: % 0.0 0.0   Nucleated RBCs Latest Ref Range: 0 /100 60 (H) 23 (H)   Absolute Neutrophil Latest Ref Range: 1.6 - 8.3 10e9/L 1.2 (L) 1.1 (L)   Absolute Lymphocytes Latest Ref Range: 0.8 - 5.3 10e9/L 2.1 1.3   Absolute Monocytes Latest Ref Range: 0.0 - 1.3 10e9/L 0.3 0.7   Absolute Eosinophils Latest Ref Range: 0.0 - 0.7 10e9/L  0.0 0.1   Absolute Basophils Latest Ref Range: 0.0 - 0.2 10e9/L 0.0 0.0   Absolute Nucleated RBC Unknown 2.2 0.7   RBC Morphology Unknown Consistent with reported results    Anisocytosis Unknown Marked Marked   RBC Fragments Unknown Slight Slight   Teardrop Cells Unknown  Slight   Acanthocytes Unknown Slight Slight   Target Cells Unknown Slight Slight   Elliptocytes Unknown Slight Slight   Quang Cells Unknown Slight    Smudge Cells Unknown Present Present   Platelet Estimate Unknown Automated count confirmed.  Platelet morphology is normal. Automated count confirmed.  Giant platelets are present.   % Retic Latest Ref Range: 0.5 - 2.0 % 3.2 (H) 3.2 (H)   Absolute Retic Latest Ref Range: 25 - 95 10e9/L 89.8 84.0   INR Latest Ref Range: 0.86 - 1.14   2.02 (H)   Hep B Surface Agn Latest Ref Range: NR^Nonreactive   Nonreactive   Hepatitis B Surface Antibody Latest Ref Range: <8.00 m[IU]/mL  0.69   Hepatitis C Antibody Latest Ref Range: NR^Nonreactive   Nonreactive   HCV RNA Quant IU/ml Latest Ref Range: HCVND^HCV RNA Not Detected [IU]/mL  HCV RNA Not Detected   Log of HCV RNA Qt Latest Ref Range: <1.2 Log IU/mL  Not Calculated         Rafita Rogel MD

## 2021-05-07 LAB
EBV DNA # SPEC NAA+PROBE: 647 {COPIES}/ML
EBV DNA SPEC NAA+PROBE-LOG#: 2.8 {LOG_COPIES}/ML

## 2021-05-18 ENCOUNTER — TELEPHONE (OUTPATIENT)
Dept: ONCOLOGY | Facility: CLINIC | Age: 76
End: 2021-05-18

## 2021-05-18 NOTE — TELEPHONE ENCOUNTER
PRIOR AUTHORIZATION DENIED    Medication: Promacta 50mg Appeal Pending    Denial Date: 3/23/2021    Denial Rational: off-kathi use    Appeal Information: faxed in 5/10 -denied - requested letter to be faaxed to me                              Wendy Paulino CPhT  [facility]  Oncology Pharmacy Liaison   [email or web address]  [phone] (phone  [phone] (fax

## 2021-05-19 NOTE — TELEPHONE ENCOUNTER
MEDICATION APPEAL DENIED    Medication: Promacta 50mg Appeal Denied    Denial Date:  5/18    Denial Rational:upheld original pa    Second Level Appeal Information: no - application in for free medication    Second level appeals will be managed by the clinic staff and provider. Please contact the [facility] Prior Authorization Team if additional information about the denial is needed.    Wendy Paulino CPhT  [facility]  Oncology Pharmacy Liaison   [email or web address]  [phone] (phone  [phone] (fax

## 2021-05-19 NOTE — TELEPHONE ENCOUNTER
This is not [facility]'s signature - will need to send in e-script.    Wendy Paulino CPhT  [facility]  Oncology Pharmacy Liaison   [email or web address]  [phone] (phone  [phone] (fax

## 2021-05-25 NOTE — TELEPHONE ENCOUNTER
Prescription verified by Jasper Sloan, PharmD.  Oral Chemotherapy Monitoring Program  [phone]

## 2021-05-27 ENCOUNTER — OFFICE VISIT (OUTPATIENT)
Dept: TRANSPLANT | Facility: CLINIC | Age: 76
End: 2021-05-27
Attending: INTERNAL MEDICINE
Payer: COMMERCIAL

## 2021-05-27 ENCOUNTER — APPOINTMENT (OUTPATIENT)
Dept: LAB | Facility: CLINIC | Age: 76
End: 2021-05-27
Attending: INTERNAL MEDICINE
Payer: COMMERCIAL

## 2021-05-27 VITALS
DIASTOLIC BLOOD PRESSURE: 82 MMHG | BODY MASS INDEX: 22.13 KG/M2 | OXYGEN SATURATION: 97 % | WEIGHT: 124.9 LBS | RESPIRATION RATE: 18 BRPM | SYSTOLIC BLOOD PRESSURE: 130 MMHG | HEART RATE: 98 BPM

## 2021-05-27 DIAGNOSIS — D61.818 PANCYTOPENIA (H): ICD-10-CM

## 2021-05-27 DIAGNOSIS — D61.9 APLASTIC ANEMIA (H): ICD-10-CM

## 2021-05-27 DIAGNOSIS — N18.4 CKD (CHRONIC KIDNEY DISEASE) STAGE 4, GFR 15-29 ML/MIN (H): ICD-10-CM

## 2021-05-27 LAB
ACANTHOCYTES BLD QL SMEAR: SLIGHT
ALBUMIN SERPL-MCNC: 2.5 G/DL (ref 3.4–5)
ALP SERPL-CCNC: 90 U/L (ref 40–150)
ALT SERPL W P-5'-P-CCNC: 24 U/L (ref 0–50)
ANION GAP SERPL CALCULATED.3IONS-SCNC: 10 MMOL/L (ref 3–14)
ANISOCYTOSIS BLD QL SMEAR: ABNORMAL
AST SERPL W P-5'-P-CCNC: 26 U/L (ref 0–45)
BASOPHILS # BLD AUTO: 0 10E9/L (ref 0–0.2)
BASOPHILS NFR BLD AUTO: 0.9 %
BILIRUB DIRECT SERPL-MCNC: 0.9 MG/DL (ref 0–0.2)
BILIRUB SERPL-MCNC: 3.6 MG/DL (ref 0.2–1.3)
BUN SERPL-MCNC: 30 MG/DL (ref 7–30)
CALCIUM SERPL-MCNC: 8.4 MG/DL (ref 8.5–10.1)
CHLORIDE SERPL-SCNC: 109 MMOL/L (ref 94–109)
CO2 SERPL-SCNC: 22 MMOL/L (ref 20–32)
CREAT SERPL-MCNC: 1.7 MG/DL (ref 0.52–1.04)
DIFFERENTIAL METHOD BLD: ABNORMAL
EOSINOPHIL # BLD AUTO: 0.2 10E9/L (ref 0–0.7)
EOSINOPHIL NFR BLD AUTO: 5.5 %
ERYTHROCYTE [DISTWIDTH] IN BLOOD BY AUTOMATED COUNT: 25.8 % (ref 10–15)
GFR SERPL CREATININE-BSD FRML MDRD: 29 ML/MIN/{1.73_M2}
GLUCOSE SERPL-MCNC: 109 MG/DL (ref 70–99)
HCT VFR BLD AUTO: 24.7 % (ref 35–47)
HGB BLD-MCNC: 7.6 G/DL (ref 11.7–15.7)
HYPOCHROMIA BLD QL: PRESENT
LDH SERPL L TO P-CCNC: 291 U/L (ref 81–234)
LYMPHOCYTES # BLD AUTO: 1.7 10E9/L (ref 0.8–5.3)
LYMPHOCYTES NFR BLD AUTO: 44.5 %
MCH RBC QN AUTO: 30.6 PG (ref 26.5–33)
MCHC RBC AUTO-ENTMCNC: 30.8 G/DL (ref 31.5–36.5)
MCV RBC AUTO: 100 FL (ref 78–100)
MONOCYTES # BLD AUTO: 0.5 10E9/L (ref 0–1.3)
MONOCYTES NFR BLD AUTO: 13.6 %
NEUTROPHILS # BLD AUTO: 1.4 10E9/L (ref 1.6–8.3)
NEUTROPHILS NFR BLD AUTO: 35.5 %
NRBC # BLD AUTO: 2.8 10*3/UL
NRBC BLD AUTO-RTO: 71 /100
PLATELET # BLD AUTO: 124 10E9/L (ref 150–450)
PLATELET # BLD EST: ABNORMAL 10*3/UL
POIKILOCYTOSIS BLD QL SMEAR: ABNORMAL
POLYCHROMASIA BLD QL SMEAR: SLIGHT
POTASSIUM SERPL-SCNC: 3.8 MMOL/L (ref 3.4–5.3)
PROT SERPL-MCNC: 7.3 G/DL (ref 6.8–8.8)
RBC # BLD AUTO: 2.48 10E12/L (ref 3.8–5.2)
RBC INCLUSIONS BLD: SLIGHT
RETICS # AUTO: 112.8 10E9/L (ref 25–95)
RETICS/RBC NFR AUTO: 4.6 % (ref 0.5–2)
SODIUM SERPL-SCNC: 141 MMOL/L (ref 133–144)
TARGETS BLD QL SMEAR: SLIGHT
WBC # BLD AUTO: 3.9 10E9/L (ref 4–11)

## 2021-05-27 PROCEDURE — 82668 ASSAY OF ERYTHROPOIETIN: CPT | Performed by: INTERNAL MEDICINE

## 2021-05-27 PROCEDURE — 83615 LACTATE (LD) (LDH) ENZYME: CPT | Performed by: INTERNAL MEDICINE

## 2021-05-27 PROCEDURE — G0463 HOSPITAL OUTPT CLINIC VISIT: HCPCS

## 2021-05-27 PROCEDURE — 80053 COMPREHEN METABOLIC PANEL: CPT | Performed by: INTERNAL MEDICINE

## 2021-05-27 PROCEDURE — 85045 AUTOMATED RETICULOCYTE COUNT: CPT | Performed by: INTERNAL MEDICINE

## 2021-05-27 PROCEDURE — 87799 DETECT AGENT NOS DNA QUANT: CPT | Performed by: INTERNAL MEDICINE

## 2021-05-27 PROCEDURE — 36415 COLL VENOUS BLD VENIPUNCTURE: CPT

## 2021-05-27 PROCEDURE — 85025 COMPLETE CBC W/AUTO DIFF WBC: CPT | Performed by: INTERNAL MEDICINE

## 2021-05-27 PROCEDURE — 99214 OFFICE O/P EST MOD 30 MIN: CPT | Performed by: INTERNAL MEDICINE

## 2021-05-27 PROCEDURE — 82248 BILIRUBIN DIRECT: CPT | Performed by: INTERNAL MEDICINE

## 2021-05-27 ASSESSMENT — PAIN SCALES - GENERAL: PAINLEVEL: NO PAIN (0)

## 2021-05-27 NOTE — LETTER
5/27/2021         RE: Bonny Raymundo  [address]  [facility] [address]      /82   Pulse 98   Resp 18   Wt 56.7 kg (124 lb 14.4 oz)   SpO2 97%   BMI 22.13 kg/m     Wt Readings from Last 4 Encounters:   05/27/21 56.7 kg (124 lb 14.4 oz)   03/24/21 57.2 kg (126 lb)   03/17/21 56.7 kg (125 lb)   01/08/21 56.7 kg (125 lb)     Bonny returns to follow-up for aplastic anemia.  She is a bit more tired and has about half good days and half bad days.  She has had a minimally productive cough but it has been going on for 4 to 6 weeks without fever hemoptysis or chest pain.  It might be a bit worse when she is recumbent but she has it during the day  as well.  She has had no external bleeding or bruising she feels like her face is puffy and she has a tiny sore on the right side of her tongue but she has been able to eat.      She had her left leg nonocclusive venous thrombus in March but has been tolerating her apixaban without problem.  When she crosses her legs she feels like her left foot feels puffier and odd but she can still walk and has had no trouble with her balance.  She has had no GI or  symptoms.    Her exam shows her weight steady since January and her vital signs are acceptable.  She has slight edema of her left foot; a bit more than the right but no bone tenderness in either site and no calf pain.  She still appears mildly jaundiced.  She has no cervical supraclavicular lymphadenopathy.  Her lungs are clear and heart tones are regular.  Her abdomen is soft and there is no palpable hepatosplenomegaly or masses.    Laboratory testing showed persistent renal insufficiency and bilirubin above 3; again with normal transaminases.  Her white blood count and platelets are stable but she is even more anemic.  Her retic count today is a bit higher but still well below what would be compensatory for this severe anemia.  LDH is only minimally elevated.    In early May hepatitis B and C serologies  and PCR were negative.  This was 6 weeks after her mid March emergency room visit when her transaminases were elevated.  EBV was low-level abnormal in mid March and we will repeat it soon along with erythropoietin level to see if her persisting renal insufficiency is compromising her red cell production.  Her reticulocyte count has remained low (though a bit higher today) associated with her ongoing anemia and chronic renal insufficiency.  A recent abdominal ultrasound showed no biliary obstruction    At this point we will continue apixaban and  the slightly reduced eltrombopag dosing at 125 mg daily and will discontinue cyclosporine completely as it is not helping her recover renal function.    We will recheck her counts in 3 weeks and I will see her again in 6 weeks or sooner if need be.     It seems likely that her mild hyperbilirubinemia, with normal transaminases is somehow a reflection of her overall illness but there is no substantially elevated LDH or other clear hemolytic signs that would tie this to a microangiopathic process.    We will see the extra labs added to today's visit and assess if other interventions would be of value.    PS/ extra labs:   EBV still positive but not high;  retic 112,000; higher than several weeks ago.  EPO elevated as well; likely appropriate for Hgb leve.    Perhaps this is eltrombopag toxicity; will consider further dose reduction or discontinuation at next lab check in 3 weeks.    Rafita Rogel MD    Professor of medicine    Results for LISSETH PARIKH (MRN [id number]) as of 5/31/2021 08:21   Ref. Range 5/5/2021 14:32 5/27/2021 16:44   Sodium Latest Ref Range: 133 - 144 mmol/L 138 141   Potassium Latest Ref Range: 3.4 - 5.3 mmol/L 4.0 3.8   Chloride Latest Ref Range: 94 - 109 mmol/L 107 109   Carbon Dioxide Latest Ref Range: 20 - 32 mmol/L 27 22   Urea Nitrogen Latest Ref Range: 7 - 30 mg/dL 30 30   Creatinine Latest Ref Range: 0.52 - 1.04 mg/dL 1.71 (H) 1.70 (H)    GFR Estimate Latest Ref Range: >60 mL/min/1.73_m2 28 (L) 29 (L)   GFR Estimate If Black Latest Ref Range: >60 mL/min/1.73_m2 33 (L) 33 (L)   Calcium Latest Ref Range: 8.5 - 10.1 mg/dL 8.0 (L) 8.4 (L)   Anion Gap Latest Ref Range: 3 - 14 mmol/L 4 10   Albumin Latest Ref Range: 3.4 - 5.0 g/dL 2.5 (L) 2.5 (L)   Protein Total Latest Ref Range: 6.8 - 8.8 g/dL 7.1 7.3   Bilirubin Total Latest Ref Range: 0.2 - 1.3 mg/dL 3.7 (H) 3.6 (H)   Alkaline Phosphatase Latest Ref Range: 40 - 150 U/L 83 90   ALT Latest Ref Range: 0 - 50 U/L 46 24   AST Latest Ref Range: 0 - 45 U/L 42 26   Bilirubin Direct Latest Ref Range: 0.0 - 0.2 mg/dL  0.9 (H)   Erythropoietin Latest Ref Range: 4 - 27 mU/mL  280 (H)   Ferritin Latest Ref Range: 8 - 252 ng/mL 33    Lactate Dehydrogenase Latest Ref Range: 81 - 234 U/L 287 (H) 291 (H)   Glucose Latest Ref Range: 70 - 99 mg/dL 112 (H) 109 (H)   WBC Latest Ref Range: 4.0 - 11.0 10e9/L 3.2 (L) 3.9 (L)   Hemoglobin Latest Ref Range: 11.7 - 15.7 g/dL 7.9 (L) 7.6 (L)   Hematocrit Latest Ref Range: 35.0 - 47.0 % 25.9 (L) 24.7 (L)   Platelet Count Latest Ref Range: 150 - 450 10e9/L 149 (L) 124 (L)   RBC Count Latest Ref Range: 3.8 - 5.2 10e12/L 2.60 (L) 2.48 (L)   MCV Latest Ref Range: 78 - 100 fl 100 100   MCH Latest Ref Range: 26.5 - 33.0 pg 30.4 30.6   MCHC Latest Ref Range: 31.5 - 36.5 g/dL 30.5 (L) 30.8 (L)   RDW Latest Ref Range: 10.0 - 15.0 % 25.2 (H) 25.8 (H)   Diff Method Unknown Manual Differential Manual Differential   % Neutrophils Latest Units: % 35.0 35.5   % Lymphocytes Latest Units: % 41.0 44.5   % Monocytes Latest Units: % 21.0 13.6   % Eosinophils Latest Units: % 3.0 5.5   % Basophils Latest Units: % 0.0 0.9   Nucleated RBCs Latest Ref Range: 0 /100 23 (H) 71 (H)   Absolute Neutrophil Latest Ref Range: 1.6 - 8.3 10e9/L 1.1 (L) 1.4 (L)   Absolute Lymphocytes Latest Ref Range: 0.8 - 5.3 10e9/L 1.3 1.7   Absolute Monocytes Latest Ref Range: 0.0 - 1.3 10e9/L 0.7 0.5   Absolute Eosinophils  Latest Ref Range: 0.0 - 0.7 10e9/L 0.1 0.2   Absolute Basophils Latest Ref Range: 0.0 - 0.2 10e9/L 0.0 0.0   Absolute Nucleated RBC Unknown 0.7 2.8   Anisocytosis Unknown Marked Marked   Poikilocytosis Unknown  Moderate   Polychromasia Unknown  Slight   RBC Fragments Unknown Slight Slight   Teardrop Cells Unknown Slight    Acanthocytes Unknown Slight Slight   Target Cells Unknown Slight Slight   Elliptocytes Unknown Slight    Smudge Cells Unknown Present    Hypochromasia Unknown  Present   Platelet Estimate Unknown Automated count confirmed.  Giant platelets are present. Confirming automated cell count   % Retic Latest Ref Range: 0.5 - 2.0 % 3.2 (H) 4.6 (H)   Absolute Retic Latest Ref Range: 25 - 95 10e9/L 84.0 112.8 (H)   INR Latest Ref Range: 0.86 - 1.14  2.02 (H)    Hep B Surface Agn Latest Ref Range: NR^Nonreactive  Nonreactive    Hepatitis B Surface Antibody Latest Ref Range: <8.00 m[IU]/mL 0.69    Hepatitis C Antibody Latest Ref Range: NR^Nonreactive  Nonreactive    EBV DNA Copies/mL Latest Ref Range: EBVNEG^EBV DNA Not Detected Copies/mL 647 (A) 1,997 (A)   EBV DNA Log of Copies Latest Ref Range: <2.7 Log_copies/mL 2.8 (H) 3.3 (H)   HCV RNA Quant IU/ml Latest Ref Range: HCVND^HCV RNA Not Detected [IU]/mL HCV RNA Not Detected    Log of HCV RNA Qt Latest Ref Range: <1.2 Log IU/mL Not Calculated          Rafita Rogel MD

## 2021-05-27 NOTE — NURSING NOTE
"Oncology Rooming Note    May 27, 2021 4:55 PM   Bonny Raymundo is a 75 year old female who presents for:    Chief Complaint   Patient presents with     Blood Draw     Labs drawn via  by RN. VS taken.     Oncology Clinic Visit     Return: Aplastic anemia     Initial Vitals: /82   Pulse 98   Resp 18   Wt 56.7 kg (124 lb 14.4 oz)   SpO2 97%   BMI 22.13 kg/m   Estimated body mass index is 22.13 kg/m  as calculated from the following:    Height as of 3/24/21: 1.6 m (5' 3\").    Weight as of this encounter: 56.7 kg (124 lb 14.4 oz). Body surface area is 1.59 meters squared.  No Pain (0) Comment: Data Unavailable   No LMP recorded. Patient has had a hysterectomy.  Allergies reviewed: Yes  Medications reviewed: Yes    Medications: Medication refills not needed today.  Pharmacy name entered into [employer]:    [facility] [place] [place] - [address]  [facility] [place], [place] - [address] [facility]  [facility] [address]  WRITTEN PRESCRIPTION REQUESTED  [id number] [place] [place] - [address]    Clinical concerns: N/A        Mary Iyer CMA              "

## 2021-05-27 NOTE — TELEPHONE ENCOUNTER
Wendy Paulino CPhT  [facility]  Oncology Pharmacy Liaison   [email or web address]  [phone] (phone  [phone] (fax

## 2021-05-27 NOTE — LETTER
5/27/2021         RE: Bonny Raymundo  [address]  [facility] [address]        Dear Colleague,    Thank you for referring your patient, Bonny Raymundo, to the [facility] [place]. Please see a copy of my visit note below.    /82   Pulse 98   Resp 18   Wt 56.7 kg (124 lb 14.4 oz)   SpO2 97%   BMI 22.13 kg/m     Wt Readings from Last 4 Encounters:   05/27/21 56.7 kg (124 lb 14.4 oz)   03/24/21 57.2 kg (126 lb)   03/17/21 56.7 kg (125 lb)   01/08/21 56.7 kg (125 lb)     Bonny returns to follow-up for aplastic anemia.  She is a bit more tired and has about half good days and half bad days.  She has had a minimally productive cough but it has been going on for 4 to 6 weeks without fever hemoptysis or chest pain.  It might be a bit worse when she is recumbent but she has it during the day  as well.  She has had no external bleeding or bruising she feels like her face is puffy and she has a tiny sore on the right side of her tongue but she has been able to eat.      She had her left leg nonocclusive venous thrombus in March but has been tolerating her apixaban without problem.  When she crosses her legs she feels like her left foot feels puffier and odd but she can still walk and has had no trouble with her balance.  She has had no GI or  symptoms.    Her exam shows her weight steady since January and her vital signs are acceptable.  She has slight edema of her left foot; a bit more than the right but no bone tenderness in either site and no calf pain.  She still appears mildly jaundiced.  She has no cervical supraclavicular lymphadenopathy.  Her lungs are clear and heart tones are regular.  Her abdomen is soft and there is no palpable hepatosplenomegaly or masses.    Laboratory testing showed persistent renal insufficiency and bilirubin above 3; again with normal transaminases.  Her white blood count and platelets are stable but she is even more  anemic.  Her retic count today is a bit higher but still well below what would be compensatory for this severe anemia.  LDH is only minimally elevated.    In early May hepatitis B and C serologies and PCR were negative.  This was 6 weeks after her mid March emergency room visit when her transaminases were elevated.  EBV was low-level abnormal in mid March and we will repeat it soon along with erythropoietin level to see if her persisting renal insufficiency is compromising her red cell production.  Her reticulocyte count has remained low (though a bit higher today) associated with her ongoing anemia and chronic renal insufficiency.  A recent abdominal ultrasound showed no biliary obstruction    At this point we will continue apixaban and  the slightly reduced eltrombopag dosing at 125 mg daily and will discontinue cyclosporine completely as it is not helping her recover renal function.    We will recheck her counts in 3 weeks and I will see her again in 6 weeks or sooner if need be.     It seems likely that her mild hyperbilirubinemia, with normal transaminases is somehow a reflection of her overall illness but there is no substantially elevated LDH or other clear hemolytic signs that would tie this to a microangiopathic process.    We will see the extra labs added to today's visit and assess if other interventions would be of value.    PS/ extra labs:   EBV still positive but not high;  retic 112,000; higher than several weeks ago.  EPO elevated as well; likely appropriate for Hgb leve.    Perhaps this is eltrombopag toxicity; will consider further dose reduction or discontinuation at next lab check in 3 weeks.    Rafita Rogel MD    Professor of medicine    Results for LISSETH PARIKH (MRN [id number]) as of 5/31/2021 08:21   Ref. Range 5/5/2021 14:32 5/27/2021 16:44   Sodium Latest Ref Range: 133 - 144 mmol/L 138 141   Potassium Latest Ref Range: 3.4 - 5.3 mmol/L 4.0 3.8   Chloride Latest Ref Range: 94 -  109 mmol/L 107 109   Carbon Dioxide Latest Ref Range: 20 - 32 mmol/L 27 22   Urea Nitrogen Latest Ref Range: 7 - 30 mg/dL 30 30   Creatinine Latest Ref Range: 0.52 - 1.04 mg/dL 1.71 (H) 1.70 (H)   GFR Estimate Latest Ref Range: >60 mL/min/1.73_m2 28 (L) 29 (L)   GFR Estimate If Black Latest Ref Range: >60 mL/min/1.73_m2 33 (L) 33 (L)   Calcium Latest Ref Range: 8.5 - 10.1 mg/dL 8.0 (L) 8.4 (L)   Anion Gap Latest Ref Range: 3 - 14 mmol/L 4 10   Albumin Latest Ref Range: 3.4 - 5.0 g/dL 2.5 (L) 2.5 (L)   Protein Total Latest Ref Range: 6.8 - 8.8 g/dL 7.1 7.3   Bilirubin Total Latest Ref Range: 0.2 - 1.3 mg/dL 3.7 (H) 3.6 (H)   Alkaline Phosphatase Latest Ref Range: 40 - 150 U/L 83 90   ALT Latest Ref Range: 0 - 50 U/L 46 24   AST Latest Ref Range: 0 - 45 U/L 42 26   Bilirubin Direct Latest Ref Range: 0.0 - 0.2 mg/dL  0.9 (H)   Erythropoietin Latest Ref Range: 4 - 27 mU/mL  280 (H)   Ferritin Latest Ref Range: 8 - 252 ng/mL 33    Lactate Dehydrogenase Latest Ref Range: 81 - 234 U/L 287 (H) 291 (H)   Glucose Latest Ref Range: 70 - 99 mg/dL 112 (H) 109 (H)   WBC Latest Ref Range: 4.0 - 11.0 10e9/L 3.2 (L) 3.9 (L)   Hemoglobin Latest Ref Range: 11.7 - 15.7 g/dL 7.9 (L) 7.6 (L)   Hematocrit Latest Ref Range: 35.0 - 47.0 % 25.9 (L) 24.7 (L)   Platelet Count Latest Ref Range: 150 - 450 10e9/L 149 (L) 124 (L)   RBC Count Latest Ref Range: 3.8 - 5.2 10e12/L 2.60 (L) 2.48 (L)   MCV Latest Ref Range: 78 - 100 fl 100 100   MCH Latest Ref Range: 26.5 - 33.0 pg 30.4 30.6   MCHC Latest Ref Range: 31.5 - 36.5 g/dL 30.5 (L) 30.8 (L)   RDW Latest Ref Range: 10.0 - 15.0 % 25.2 (H) 25.8 (H)   Diff Method Unknown Manual Differential Manual Differential   % Neutrophils Latest Units: % 35.0 35.5   % Lymphocytes Latest Units: % 41.0 44.5   % Monocytes Latest Units: % 21.0 13.6   % Eosinophils Latest Units: % 3.0 5.5   % Basophils Latest Units: % 0.0 0.9   Nucleated RBCs Latest Ref Range: 0 /100 23 (H) 71 (H)   Absolute Neutrophil Latest Ref  Range: 1.6 - 8.3 10e9/L 1.1 (L) 1.4 (L)   Absolute Lymphocytes Latest Ref Range: 0.8 - 5.3 10e9/L 1.3 1.7   Absolute Monocytes Latest Ref Range: 0.0 - 1.3 10e9/L 0.7 0.5   Absolute Eosinophils Latest Ref Range: 0.0 - 0.7 10e9/L 0.1 0.2   Absolute Basophils Latest Ref Range: 0.0 - 0.2 10e9/L 0.0 0.0   Absolute Nucleated RBC Unknown 0.7 2.8   Anisocytosis Unknown Marked Marked   Poikilocytosis Unknown  Moderate   Polychromasia Unknown  Slight   RBC Fragments Unknown Slight Slight   Teardrop Cells Unknown Slight    Acanthocytes Unknown Slight Slight   Target Cells Unknown Slight Slight   Elliptocytes Unknown Slight    Smudge Cells Unknown Present    Hypochromasia Unknown  Present   Platelet Estimate Unknown Automated count confirmed.  Giant platelets are present. Confirming automated cell count   % Retic Latest Ref Range: 0.5 - 2.0 % 3.2 (H) 4.6 (H)   Absolute Retic Latest Ref Range: 25 - 95 10e9/L 84.0 112.8 (H)   INR Latest Ref Range: 0.86 - 1.14  2.02 (H)    Hep B Surface Agn Latest Ref Range: NR^Nonreactive  Nonreactive    Hepatitis B Surface Antibody Latest Ref Range: <8.00 m[IU]/mL 0.69    Hepatitis C Antibody Latest Ref Range: NR^Nonreactive  Nonreactive    EBV DNA Copies/mL Latest Ref Range: EBVNEG^EBV DNA Not Detected Copies/mL 647 (A) 1,997 (A)   EBV DNA Log of Copies Latest Ref Range: <2.7 Log_copies/mL 2.8 (H) 3.3 (H)   HCV RNA Quant IU/ml Latest Ref Range: HCVND^HCV RNA Not Detected [IU]/mL HCV RNA Not Detected    Log of HCV RNA Qt Latest Ref Range: <1.2 Log IU/mL Not Calculated        Again, thank you for allowing me to participate in the care of your patient.        Sincerely,        Rafita Rogel MD

## 2021-05-27 NOTE — PROGRESS NOTES
/82   Pulse 98   Resp 18   Wt 56.7 kg (124 lb 14.4 oz)   SpO2 97%   BMI 22.13 kg/m     Wt Readings from Last 4 Encounters:   05/27/21 56.7 kg (124 lb 14.4 oz)   03/24/21 57.2 kg (126 lb)   03/17/21 56.7 kg (125 lb)   01/08/21 56.7 kg (125 lb)     Bonny returns to follow-up for aplastic anemia.  She is a bit more tired and has about half good days and half bad days.  She has had a minimally productive cough but it has been going on for 4 to 6 weeks without fever hemoptysis or chest pain.  It might be a bit worse when she is recumbent but she has it during the day  as well.  She has had no external bleeding or bruising she feels like her face is puffy and she has a tiny sore on the right side of her tongue but she has been able to eat.      She had her left leg nonocclusive venous thrombus in March but has been tolerating her apixaban without problem.  When she crosses her legs she feels like her left foot feels puffier and odd but she can still walk and has had no trouble with her balance.  She has had no GI or  symptoms.    Her exam shows her weight steady since January and her vital signs are acceptable.  She has slight edema of her left foot; a bit more than the right but no bone tenderness in either site and no calf pain.  She still appears mildly jaundiced.  She has no cervical supraclavicular lymphadenopathy.  Her lungs are clear and heart tones are regular.  Her abdomen is soft and there is no palpable hepatosplenomegaly or masses.    Laboratory testing showed persistent renal insufficiency and bilirubin above 3; again with normal transaminases.  Her white blood count and platelets are stable but she is even more anemic.  Her retic count today is a bit higher but still well below what would be compensatory for this severe anemia.  LDH is only minimally elevated.    In early May hepatitis B and C serologies and PCR were negative.  This was 6 weeks after her mid March emergency room visit when her  transaminases were elevated.  EBV was low-level abnormal in mid March and we will repeat it soon along with erythropoietin level to see if her persisting renal insufficiency is compromising her red cell production.  Her reticulocyte count has remained low (though a bit higher today) associated with her ongoing anemia and chronic renal insufficiency.  A recent abdominal ultrasound showed no biliary obstruction    At this point we will continue apixaban and  the slightly reduced eltrombopag dosing at 125 mg daily and will discontinue cyclosporine completely as it is not helping her recover renal function.    We will recheck her counts in 3 weeks and I will see her again in 6 weeks or sooner if need be.     It seems likely that her mild hyperbilirubinemia, with normal transaminases is somehow a reflection of her overall illness but there is no substantially elevated LDH or other clear hemolytic signs that would tie this to a microangiopathic process.    We will see the extra labs added to today's visit and assess if other interventions would be of value.    PS/ extra labs:   EBV still positive but not high;  retic 112,000; higher than several weeks ago.  EPO elevated as well; likely appropriate for Hgb leve.    Perhaps this is eltrombopag toxicity; will consider further dose reduction or discontinuation at next lab check in 3 weeks.    Rafita Rogel MD    Professor of medicine    Results for LISSETH PARIKH (MRN [id number]) as of 5/31/2021 08:21   Ref. Range 5/5/2021 14:32 5/27/2021 16:44   Sodium Latest Ref Range: 133 - 144 mmol/L 138 141   Potassium Latest Ref Range: 3.4 - 5.3 mmol/L 4.0 3.8   Chloride Latest Ref Range: 94 - 109 mmol/L 107 109   Carbon Dioxide Latest Ref Range: 20 - 32 mmol/L 27 22   Urea Nitrogen Latest Ref Range: 7 - 30 mg/dL 30 30   Creatinine Latest Ref Range: 0.52 - 1.04 mg/dL 1.71 (H) 1.70 (H)   GFR Estimate Latest Ref Range: >60 mL/min/1.73_m2 28 (L) 29 (L)   GFR Estimate If Black  Latest Ref Range: >60 mL/min/1.73_m2 33 (L) 33 (L)   Calcium Latest Ref Range: 8.5 - 10.1 mg/dL 8.0 (L) 8.4 (L)   Anion Gap Latest Ref Range: 3 - 14 mmol/L 4 10   Albumin Latest Ref Range: 3.4 - 5.0 g/dL 2.5 (L) 2.5 (L)   Protein Total Latest Ref Range: 6.8 - 8.8 g/dL 7.1 7.3   Bilirubin Total Latest Ref Range: 0.2 - 1.3 mg/dL 3.7 (H) 3.6 (H)   Alkaline Phosphatase Latest Ref Range: 40 - 150 U/L 83 90   ALT Latest Ref Range: 0 - 50 U/L 46 24   AST Latest Ref Range: 0 - 45 U/L 42 26   Bilirubin Direct Latest Ref Range: 0.0 - 0.2 mg/dL  0.9 (H)   Erythropoietin Latest Ref Range: 4 - 27 mU/mL  280 (H)   Ferritin Latest Ref Range: 8 - 252 ng/mL 33    Lactate Dehydrogenase Latest Ref Range: 81 - 234 U/L 287 (H) 291 (H)   Glucose Latest Ref Range: 70 - 99 mg/dL 112 (H) 109 (H)   WBC Latest Ref Range: 4.0 - 11.0 10e9/L 3.2 (L) 3.9 (L)   Hemoglobin Latest Ref Range: 11.7 - 15.7 g/dL 7.9 (L) 7.6 (L)   Hematocrit Latest Ref Range: 35.0 - 47.0 % 25.9 (L) 24.7 (L)   Platelet Count Latest Ref Range: 150 - 450 10e9/L 149 (L) 124 (L)   RBC Count Latest Ref Range: 3.8 - 5.2 10e12/L 2.60 (L) 2.48 (L)   MCV Latest Ref Range: 78 - 100 fl 100 100   MCH Latest Ref Range: 26.5 - 33.0 pg 30.4 30.6   MCHC Latest Ref Range: 31.5 - 36.5 g/dL 30.5 (L) 30.8 (L)   RDW Latest Ref Range: 10.0 - 15.0 % 25.2 (H) 25.8 (H)   Diff Method Unknown Manual Differential Manual Differential   % Neutrophils Latest Units: % 35.0 35.5   % Lymphocytes Latest Units: % 41.0 44.5   % Monocytes Latest Units: % 21.0 13.6   % Eosinophils Latest Units: % 3.0 5.5   % Basophils Latest Units: % 0.0 0.9   Nucleated RBCs Latest Ref Range: 0 /100 23 (H) 71 (H)   Absolute Neutrophil Latest Ref Range: 1.6 - 8.3 10e9/L 1.1 (L) 1.4 (L)   Absolute Lymphocytes Latest Ref Range: 0.8 - 5.3 10e9/L 1.3 1.7   Absolute Monocytes Latest Ref Range: 0.0 - 1.3 10e9/L 0.7 0.5   Absolute Eosinophils Latest Ref Range: 0.0 - 0.7 10e9/L 0.1 0.2   Absolute Basophils Latest Ref Range: 0.0 - 0.2  10e9/L 0.0 0.0   Absolute Nucleated RBC Unknown 0.7 2.8   Anisocytosis Unknown Marked Marked   Poikilocytosis Unknown  Moderate   Polychromasia Unknown  Slight   RBC Fragments Unknown Slight Slight   Teardrop Cells Unknown Slight    Acanthocytes Unknown Slight Slight   Target Cells Unknown Slight Slight   Elliptocytes Unknown Slight    Smudge Cells Unknown Present    Hypochromasia Unknown  Present   Platelet Estimate Unknown Automated count confirmed.  Giant platelets are present. Confirming automated cell count   % Retic Latest Ref Range: 0.5 - 2.0 % 3.2 (H) 4.6 (H)   Absolute Retic Latest Ref Range: 25 - 95 10e9/L 84.0 112.8 (H)   INR Latest Ref Range: 0.86 - 1.14  2.02 (H)    Hep B Surface Agn Latest Ref Range: NR^Nonreactive  Nonreactive    Hepatitis B Surface Antibody Latest Ref Range: <8.00 m[IU]/mL 0.69    Hepatitis C Antibody Latest Ref Range: NR^Nonreactive  Nonreactive    EBV DNA Copies/mL Latest Ref Range: EBVNEG^EBV DNA Not Detected Copies/mL 647 (A) 1,997 (A)   EBV DNA Log of Copies Latest Ref Range: <2.7 Log_copies/mL 2.8 (H) 3.3 (H)   HCV RNA Quant IU/ml Latest Ref Range: HCVND^HCV RNA Not Detected [IU]/mL HCV RNA Not Detected    Log of HCV RNA Qt Latest Ref Range: <1.2 Log IU/mL Not Calculated

## 2021-05-28 LAB
EBV DNA # SPEC NAA+PROBE: 1997 {COPIES}/ML
EBV DNA SPEC NAA+PROBE-LOG#: 3.3 {LOG_COPIES}/ML

## 2021-05-29 DIAGNOSIS — K21.9 GASTROESOPHAGEAL REFLUX DISEASE WITHOUT ESOPHAGITIS: ICD-10-CM

## 2021-05-29 LAB — EPO SERPL-ACNC: 280 MU/ML (ref 4–27)

## 2021-06-01 DIAGNOSIS — N18.4 CKD (CHRONIC KIDNEY DISEASE) STAGE 4, GFR 15-29 ML/MIN (H): Primary | ICD-10-CM

## 2021-06-01 DIAGNOSIS — D61.9 APLASTIC ANEMIA (H): ICD-10-CM

## 2021-06-02 NOTE — TELEPHONE ENCOUNTER
[facility] Refill Request    Date of last oncology visit: 5/27/21  Provider: Pebbles  Date of next oncology visit: 7/8/21  Provider: Pebbles    Notes related to prescription from last visit:   None noted    Routed to provider

## 2021-06-03 RX ORDER — PANTOPRAZOLE SODIUM 20 MG/1
TABLET, DELAYED RELEASE ORAL
Qty: 90 TABLET | Refills: 11 | Status: SHIPPED | OUTPATIENT
Start: 2021-06-03 | End: 2022-06-14

## 2021-06-17 ENCOUNTER — INFUSION THERAPY VISIT (OUTPATIENT)
Dept: INFUSION THERAPY | Facility: CLINIC | Age: 76
End: 2021-06-17
Attending: INTERNAL MEDICINE
Payer: COMMERCIAL

## 2021-06-17 ENCOUNTER — HOSPITAL ENCOUNTER (OUTPATIENT)
Facility: CLINIC | Age: 76
Setting detail: SPECIMEN
Discharge: HOME | End: 2021-06-17
Attending: INTERNAL MEDICINE | Admitting: INTERNAL MEDICINE
Payer: COMMERCIAL

## 2021-06-17 DIAGNOSIS — D61.9 APLASTIC ANEMIA (H): ICD-10-CM

## 2021-06-17 DIAGNOSIS — N18.4 CKD (CHRONIC KIDNEY DISEASE) STAGE 4, GFR 15-29 ML/MIN (H): ICD-10-CM

## 2021-06-17 DIAGNOSIS — D61.818 PANCYTOPENIA (H): ICD-10-CM

## 2021-06-17 LAB
ALBUMIN SERPL-MCNC: 2.3 G/DL (ref 3.4–5)
ALP SERPL-CCNC: 81 U/L (ref 40–150)
ALT SERPL W P-5'-P-CCNC: 24 U/L (ref 0–50)
ANION GAP SERPL CALCULATED.3IONS-SCNC: 7 MMOL/L (ref 3–14)
ANISOCYTOSIS BLD QL SMEAR: ABNORMAL
AST SERPL W P-5'-P-CCNC: 23 U/L (ref 0–45)
BASOPHILS # BLD AUTO: 0 10E9/L (ref 0–0.2)
BASOPHILS NFR BLD AUTO: 1.5 %
BILIRUB SERPL-MCNC: 1.7 MG/DL (ref 0.2–1.3)
BUN SERPL-MCNC: 23 MG/DL (ref 7–30)
CALCIUM SERPL-MCNC: 8.3 MG/DL (ref 8.5–10.1)
CHLORIDE SERPL-SCNC: 112 MMOL/L (ref 94–109)
CO2 SERPL-SCNC: 24 MMOL/L (ref 20–32)
CREAT SERPL-MCNC: 1.68 MG/DL (ref 0.52–1.04)
DIFFERENTIAL METHOD BLD: ABNORMAL
EOSINOPHIL # BLD AUTO: 0.1 10E9/L (ref 0–0.7)
EOSINOPHIL NFR BLD AUTO: 3 %
ERYTHROCYTE [DISTWIDTH] IN BLOOD BY AUTOMATED COUNT: 23.7 % (ref 10–15)
FERRITIN SERPL-MCNC: 20 NG/ML (ref 8–252)
GFR SERPL CREATININE-BSD FRML MDRD: 29 ML/MIN/{1.73_M2}
GLUCOSE SERPL-MCNC: 148 MG/DL (ref 70–99)
HCT VFR BLD AUTO: 23.9 % (ref 35–47)
HGB BLD-MCNC: 7.1 G/DL (ref 11.7–15.7)
HYPOCHROMIA BLD QL: PRESENT
IRON SATN MFR SERPL: 42 % (ref 15–46)
IRON SERPL-MCNC: 166 UG/DL (ref 35–180)
LDH SERPL L TO P-CCNC: 301 U/L (ref 81–234)
LYMPHOCYTES # BLD AUTO: 0.7 10E9/L (ref 0.8–5.3)
LYMPHOCYTES NFR BLD AUTO: 27.5 %
MCH RBC QN AUTO: 28.7 PG (ref 26.5–33)
MCHC RBC AUTO-ENTMCNC: 29.7 G/DL (ref 31.5–36.5)
MCV RBC AUTO: 97 FL (ref 78–100)
METAMYELOCYTES # BLD: 0 10E9/L
METAMYELOCYTES NFR BLD MANUAL: 1.5 %
MONOCYTES # BLD AUTO: 0.3 10E9/L (ref 0–1.3)
MONOCYTES NFR BLD AUTO: 11 %
NEUTROPHILS # BLD AUTO: 1.3 10E9/L (ref 1.6–8.3)
NEUTROPHILS NFR BLD AUTO: 55.5 %
NRBC # BLD AUTO: 0.3 10*3/UL
NRBC BLD AUTO-RTO: 12 /100
PLATELET # BLD AUTO: 129 10E9/L (ref 150–450)
POLYCHROMASIA BLD QL SMEAR: SLIGHT
POTASSIUM SERPL-SCNC: 3.9 MMOL/L (ref 3.4–5.3)
PROT SERPL-MCNC: 6.9 G/DL (ref 6.8–8.8)
RBC # BLD AUTO: 2.47 10E12/L (ref 3.8–5.2)
RBC INCLUSIONS BLD: SLIGHT
RETICS # AUTO: 69.4 10E9/L (ref 25–95)
RETICS/RBC NFR AUTO: 2.8 % (ref 0.5–2)
SODIUM SERPL-SCNC: 143 MMOL/L (ref 133–144)
TARGETS BLD QL SMEAR: SLIGHT
TIBC SERPL-MCNC: 395 UG/DL (ref 240–430)
WBC # BLD AUTO: 2.4 10E9/L (ref 4–11)

## 2021-06-17 PROCEDURE — 83550 IRON BINDING TEST: CPT | Performed by: INTERNAL MEDICINE

## 2021-06-17 PROCEDURE — 80053 COMPREHEN METABOLIC PANEL: CPT | Performed by: INTERNAL MEDICINE

## 2021-06-17 PROCEDURE — 85045 AUTOMATED RETICULOCYTE COUNT: CPT | Performed by: INTERNAL MEDICINE

## 2021-06-17 PROCEDURE — 87799 DETECT AGENT NOS DNA QUANT: CPT | Performed by: INTERNAL MEDICINE

## 2021-06-17 PROCEDURE — 82728 ASSAY OF FERRITIN: CPT | Performed by: INTERNAL MEDICINE

## 2021-06-17 PROCEDURE — 83615 LACTATE (LD) (LDH) ENZYME: CPT | Performed by: INTERNAL MEDICINE

## 2021-06-17 PROCEDURE — 83540 ASSAY OF IRON: CPT | Performed by: INTERNAL MEDICINE

## 2021-06-17 PROCEDURE — 36415 COLL VENOUS BLD VENIPUNCTURE: CPT

## 2021-06-17 PROCEDURE — 85025 COMPLETE CBC W/AUTO DIFF WBC: CPT | Performed by: INTERNAL MEDICINE

## 2021-06-19 LAB
EBV DNA # SPEC NAA+PROBE: 4467 {COPIES}/ML
EBV DNA SPEC NAA+PROBE-LOG#: 3.7 {LOG_COPIES}/ML

## 2021-06-24 ENCOUNTER — VIRTUAL VISIT (OUTPATIENT)
Dept: TRANSPLANT | Facility: CLINIC | Age: 76
End: 2021-06-24
Attending: INTERNAL MEDICINE
Payer: COMMERCIAL

## 2021-06-24 DIAGNOSIS — D61.3 IDIOPATHIC APLASTIC ANEMIA (H): ICD-10-CM

## 2021-06-24 DIAGNOSIS — D61.9 APLASTIC ANEMIA (H): Primary | ICD-10-CM

## 2021-06-24 PROCEDURE — 99443 PR PHYSICIAN TELEPHONE EVALUATION 21-30 MIN: CPT | Mod: 95 | Performed by: INTERNAL MEDICINE

## 2021-06-24 RX ORDER — ALBUTEROL SULFATE 90 UG/1
1-2 AEROSOL, METERED RESPIRATORY (INHALATION)
Status: CANCELLED
Start: 2021-06-28

## 2021-06-24 RX ORDER — EPINEPHRINE 1 MG/ML
0.3 INJECTION, SOLUTION INTRAMUSCULAR; SUBCUTANEOUS EVERY 5 MIN PRN
Status: CANCELLED | OUTPATIENT
Start: 2021-06-28

## 2021-06-24 RX ORDER — METHYLPREDNISOLONE SODIUM SUCCINATE 125 MG/2ML
125 INJECTION, POWDER, LYOPHILIZED, FOR SOLUTION INTRAMUSCULAR; INTRAVENOUS
Status: CANCELLED
Start: 2021-06-28

## 2021-06-24 RX ORDER — ALBUTEROL SULFATE 5 MG/ML
2.5 SOLUTION RESPIRATORY (INHALATION)
Status: CANCELLED | OUTPATIENT
Start: 2021-06-28

## 2021-06-24 RX ORDER — MEPERIDINE HYDROCHLORIDE 25 MG/ML
25 INJECTION INTRAMUSCULAR; INTRAVENOUS; SUBCUTANEOUS EVERY 30 MIN PRN
Status: CANCELLED | OUTPATIENT
Start: 2021-06-28

## 2021-06-24 RX ORDER — NALOXONE HYDROCHLORIDE 0.4 MG/ML
0.2 INJECTION, SOLUTION INTRAMUSCULAR; INTRAVENOUS; SUBCUTANEOUS
Status: CANCELLED | OUTPATIENT
Start: 2021-06-28

## 2021-06-24 RX ORDER — HEPARIN SODIUM,PORCINE 10 UNIT/ML
5 VIAL (ML) INTRAVENOUS
Status: CANCELLED | OUTPATIENT
Start: 2021-06-28

## 2021-06-24 RX ORDER — HEPARIN SODIUM (PORCINE) LOCK FLUSH IV SOLN 100 UNIT/ML 100 UNIT/ML
5 SOLUTION INTRAVENOUS
Status: CANCELLED | OUTPATIENT
Start: 2021-06-28

## 2021-06-24 RX ORDER — DIPHENHYDRAMINE HYDROCHLORIDE 50 MG/ML
50 INJECTION INTRAMUSCULAR; INTRAVENOUS
Status: CANCELLED
Start: 2021-06-28

## 2021-06-24 NOTE — LETTER
"    6/24/2021         RE: Bonny Raymundo  [address]  [facility] [address]      Bonny Raymundo is a 75 year old who is being evaluated via a billable telephone visit.      What phone number would you like to be contacted at? [phone]    How would you like to obtain your AVS? Angelito    Vitals - Patient Reported  Weight (Patient Reported): 56.7 kg (125 lb)  Height (Patient Reported): 160 cm (5' 2.99\")  BMI (Based on Pt Reported Ht/Wt): 22.15  Pain Score: No Pain (0)    Fell 06/15/2021 has black eye/ cheek on right side and some swelling on bridge of nose.    Roque Erwin LPN    This is a 25 minute (including lab and records review) phone visit follow-up for Bonny and her 4+ years of aplastic anemia.  We had progressively reduced and then stopped her cyclosporine because of renal insufficiency and stopped her eltrombopag because of progressive modest hyperbilirubinemia.  She has been off both medicines for several months.    She had one fall recently where she tripped on her untied shoelace in her garden and hit her face which is worrisome because of her anticoagulation with apixaban.  It gave her a black eye and some swelling of the bridge of her nose but no persisting external bleeding.  Her energy is intermediate but she has had no fever chills or external bleeding.  She has no new bone pain or GI upset.  She has no respiratory or cardiac symptomatology.    Her white count and platelets are steady but her hemoglobin is down a bit over the last few months.  Her red cells are mildly hypochromic but macrocytic.  Her iron studies showed mixed findings with a ferritin down to 20 but adequate iron saturation.  Since her ferritin in the past was substantially higher it is possible that she has had progressive iron loss with minor bleeding.  Additionally her chronic renal insufficiency could compromise her erythropoietin production even though when measured once in late May it was 280 and thus was " probably appropriate for her hemoglobin at the time.    We will continue to hold the eltrombopag and cyclosporine but give her partial iron repletion parenterally and see if that resolves a bit her anemia.  Will check her counts and retake on the day of the iron infusion and again 3 weeks later and I will see her roughly 4 weeks from now.    Rafita Rogel MD    Professor medicine    Results for LISSETH PARIKH (MRN [id number]) as of 6/24/2021 16:26   Ref. Range 5/5/2021 14:32 5/27/2021 16:44 6/17/2021 14:28   Sodium Latest Ref Range: 133 - 144 mmol/L 138 141 143   Potassium Latest Ref Range: 3.4 - 5.3 mmol/L 4.0 3.8 3.9   Chloride Latest Ref Range: 94 - 109 mmol/L 107 109 112 (H)   Carbon Dioxide Latest Ref Range: 20 - 32 mmol/L 27 22 24   Urea Nitrogen Latest Ref Range: 7 - 30 mg/dL 30 30 23   Creatinine Latest Ref Range: 0.52 - 1.04 mg/dL 1.71 (H) 1.70 (H) 1.68 (H)   GFR Estimate Latest Ref Range: >60 mL/min/1.73_m2 28 (L) 29 (L) 29 (L)   GFR Estimate If Black Latest Ref Range: >60 mL/min/1.73_m2 33 (L) 33 (L) 33 (L)   Calcium Latest Ref Range: 8.5 - 10.1 mg/dL 8.0 (L) 8.4 (L) 8.3 (L)   Anion Gap Latest Ref Range: 3 - 14 mmol/L 4 10 7   Albumin Latest Ref Range: 3.4 - 5.0 g/dL 2.5 (L) 2.5 (L) 2.3 (L)   Protein Total Latest Ref Range: 6.8 - 8.8 g/dL 7.1 7.3 6.9   Bilirubin Total Latest Ref Range: 0.2 - 1.3 mg/dL 3.7 (H) 3.6 (H) 1.7 (H)   Alkaline Phosphatase Latest Ref Range: 40 - 150 U/L 83 90 81   ALT Latest Ref Range: 0 - 50 U/L 46 24 24   AST Latest Ref Range: 0 - 45 U/L 42 26 23   Bilirubin Direct Latest Ref Range: 0.0 - 0.2 mg/dL  0.9 (H)    Erythropoietin Latest Ref Range: 4 - 27 mU/mL  280 (H)    Ferritin Latest Ref Range: 8 - 252 ng/mL 33  20   Iron Latest Ref Range: 35 - 180 ug/dL   166   Iron Binding Cap Latest Ref Range: 240 - 430 ug/dL   395   Iron Saturation Index Latest Ref Range: 15 - 46 %   42   Lactate Dehydrogenase Latest Ref Range: 81 - 234 U/L 287 (H) 291 (H) 301 (H)   Glucose Latest  Ref Range: 70 - 99 mg/dL 112 (H) 109 (H) 148 (H)   WBC Latest Ref Range: 4.0 - 11.0 10e9/L 3.2 (L) 3.9 (L) 2.4 (L)   Hemoglobin Latest Ref Range: 11.7 - 15.7 g/dL 7.9 (L) 7.6 (L) 7.1 (L)   Hematocrit Latest Ref Range: 35.0 - 47.0 % 25.9 (L) 24.7 (L) 23.9 (L)   Platelet Count Latest Ref Range: 150 - 450 10e9/L 149 (L) 124 (L) 129 (L)   RBC Count Latest Ref Range: 3.8 - 5.2 10e12/L 2.60 (L) 2.48 (L) 2.47 (L)   MCV Latest Ref Range: 78 - 100 fl 100 100 97   MCH Latest Ref Range: 26.5 - 33.0 pg 30.4 30.6 28.7   MCHC Latest Ref Range: 31.5 - 36.5 g/dL 30.5 (L) 30.8 (L) 29.7 (L)   RDW Latest Ref Range: 10.0 - 15.0 % 25.2 (H) 25.8 (H) 23.7 (H)   Diff Method Unknown Manual Differential Manual Differential Manual Differential   % Neutrophils Latest Units: % 35.0 35.5 55.5   % Lymphocytes Latest Units: % 41.0 44.5 27.5   % Monocytes Latest Units: % 21.0 13.6 11.0   % Eosinophils Latest Units: % 3.0 5.5 3.0   % Basophils Latest Units: % 0.0 0.9 1.5   % Metamyelocytes Latest Units: %   1.5   Nucleated RBCs Latest Ref Range: 0 /100 23 (H) 71 (H) 12 (H)   Absolute Neutrophil Latest Ref Range: 1.6 - 8.3 10e9/L 1.1 (L) 1.4 (L) 1.3 (L)   Absolute Lymphocytes Latest Ref Range: 0.8 - 5.3 10e9/L 1.3 1.7 0.7 (L)   Absolute Monocytes Latest Ref Range: 0.0 - 1.3 10e9/L 0.7 0.5 0.3   Absolute Eosinophils Latest Ref Range: 0.0 - 0.7 10e9/L 0.1 0.2 0.1   Absolute Basophils Latest Ref Range: 0.0 - 0.2 10e9/L 0.0 0.0 0.0   Absolute Metamyelocytes Latest Ref Range: 0 10e9/L   0.0   Absolute Nucleated RBC Unknown 0.7 2.8 0.3   Anisocytosis Unknown Marked Marked Marked   Poikilocytosis Unknown  Moderate    Polychromasia Unknown  Slight Slight   RBC Fragments Unknown Slight Slight Slight   Teardrop Cells Unknown Slight     Acanthocytes Unknown Slight Slight    Target Cells Unknown Slight Slight Slight   Elliptocytes Unknown Slight     Smudge Cells Unknown Present     Hypochromasia Unknown  Present Present   Platelet Estimate Unknown Automated  count confirmed.  Giant platelets are present. Confirming automated cell count    % Retic Latest Ref Range: 0.5 - 2.0 % 3.2 (H) 4.6 (H) 2.8 (H)   Absolute Retic Latest Ref Range: 25 - 95 10e9/L 84.0 112.8 (H) 69.4   INR Latest Ref Range: 0.86 - 1.14  2.02 (H)     Hep B Surface Agn Latest Ref Range: NR^Nonreactive  Nonreactive     Hepatitis B Surface Antibody Latest Ref Range: <8.00 m[IU]/mL 0.69     Hepatitis C Antibody Latest Ref Range: NR^Nonreactive  Nonreactive     EBV DNA Copies/mL Latest Ref Range: EBVNEG^EBV DNA Not Detected Copies/mL 647 (A) 1,997 (A) 4,467 (A)   EBV DNA Log of Copies Latest Ref Range: <2.7 Log_copies/mL 2.8 (H) 3.3 (H) 3.7 (H)   HCV RNA Quant IU/ml Latest Ref Range: HCVND^HCV RNA Not Detected [IU]/mL HCV RNA Not Detected     Log of HCV RNA Qt Latest Ref Range: <1.2 Log IU/mL Not Calculated           Rafita Rogel MD

## 2021-06-24 NOTE — PROGRESS NOTES
"Bonny Raymundo is a 75 year old who is being evaluated via a billable telephone visit.      What phone number would you like to be contacted at? [phone]    How would you like to obtain your AVS? Angelito    Vitals - Patient Reported  Weight (Patient Reported): 56.7 kg (125 lb)  Height (Patient Reported): 160 cm (5' 2.99\")  BMI (Based on Pt Reported Ht/Wt): 22.15  Pain Score: No Pain (0)    Fell 06/15/2021 has black eye/ cheek on right side and some swelling on bridge of nose.    Roque Erwin LPN    This is a 25 minute (including lab and records review) phone visit follow-up for Bonny and her 4+ years of aplastic anemia.  We had progressively reduced and then stopped her cyclosporine because of renal insufficiency and stopped her eltrombopag because of progressive modest hyperbilirubinemia.  She has been off both medicines for several months.    She had one fall recently where she tripped on her untied shoelace in her garden and hit her face which is worrisome because of her anticoagulation with apixaban.  It gave her a black eye and some swelling of the bridge of her nose but no persisting external bleeding.  Her energy is intermediate but she has had no fever chills or external bleeding.  She has no new bone pain or GI upset.  She has no respiratory or cardiac symptomatology.    Her white count and platelets are steady but her hemoglobin is down a bit over the last few months.  Her red cells are mildly hypochromic but macrocytic.  Her iron studies showed mixed findings with a ferritin down to 20 but adequate iron saturation.  Since her ferritin in the past was substantially higher it is possible that she has had progressive iron loss with minor bleeding.  Additionally her chronic renal insufficiency could compromise her erythropoietin production even though when measured once in late May it was 280 and thus was probably appropriate for her hemoglobin at the time.    We will continue to hold the eltrombopag " and cyclosporine but give her partial iron repletion parenterally and see if that resolves a bit her anemia.  Will check her counts and retake on the day of the iron infusion and again 3 weeks later and I will see her roughly 4 weeks from now.    Rafita Rogel MD    Professor medicine    Results for LISSETH PARIKH (MRN [id number]) as of 6/24/2021 16:26   Ref. Range 5/5/2021 14:32 5/27/2021 16:44 6/17/2021 14:28   Sodium Latest Ref Range: 133 - 144 mmol/L 138 141 143   Potassium Latest Ref Range: 3.4 - 5.3 mmol/L 4.0 3.8 3.9   Chloride Latest Ref Range: 94 - 109 mmol/L 107 109 112 (H)   Carbon Dioxide Latest Ref Range: 20 - 32 mmol/L 27 22 24   Urea Nitrogen Latest Ref Range: 7 - 30 mg/dL 30 30 23   Creatinine Latest Ref Range: 0.52 - 1.04 mg/dL 1.71 (H) 1.70 (H) 1.68 (H)   GFR Estimate Latest Ref Range: >60 mL/min/1.73_m2 28 (L) 29 (L) 29 (L)   GFR Estimate If Black Latest Ref Range: >60 mL/min/1.73_m2 33 (L) 33 (L) 33 (L)   Calcium Latest Ref Range: 8.5 - 10.1 mg/dL 8.0 (L) 8.4 (L) 8.3 (L)   Anion Gap Latest Ref Range: 3 - 14 mmol/L 4 10 7   Albumin Latest Ref Range: 3.4 - 5.0 g/dL 2.5 (L) 2.5 (L) 2.3 (L)   Protein Total Latest Ref Range: 6.8 - 8.8 g/dL 7.1 7.3 6.9   Bilirubin Total Latest Ref Range: 0.2 - 1.3 mg/dL 3.7 (H) 3.6 (H) 1.7 (H)   Alkaline Phosphatase Latest Ref Range: 40 - 150 U/L 83 90 81   ALT Latest Ref Range: 0 - 50 U/L 46 24 24   AST Latest Ref Range: 0 - 45 U/L 42 26 23   Bilirubin Direct Latest Ref Range: 0.0 - 0.2 mg/dL  0.9 (H)    Erythropoietin Latest Ref Range: 4 - 27 mU/mL  280 (H)    Ferritin Latest Ref Range: 8 - 252 ng/mL 33  20   Iron Latest Ref Range: 35 - 180 ug/dL   166   Iron Binding Cap Latest Ref Range: 240 - 430 ug/dL   395   Iron Saturation Index Latest Ref Range: 15 - 46 %   42   Lactate Dehydrogenase Latest Ref Range: 81 - 234 U/L 287 (H) 291 (H) 301 (H)   Glucose Latest Ref Range: 70 - 99 mg/dL 112 (H) 109 (H) 148 (H)   WBC Latest Ref Range: 4.0 - 11.0 10e9/L 3.2  (L) 3.9 (L) 2.4 (L)   Hemoglobin Latest Ref Range: 11.7 - 15.7 g/dL 7.9 (L) 7.6 (L) 7.1 (L)   Hematocrit Latest Ref Range: 35.0 - 47.0 % 25.9 (L) 24.7 (L) 23.9 (L)   Platelet Count Latest Ref Range: 150 - 450 10e9/L 149 (L) 124 (L) 129 (L)   RBC Count Latest Ref Range: 3.8 - 5.2 10e12/L 2.60 (L) 2.48 (L) 2.47 (L)   MCV Latest Ref Range: 78 - 100 fl 100 100 97   MCH Latest Ref Range: 26.5 - 33.0 pg 30.4 30.6 28.7   MCHC Latest Ref Range: 31.5 - 36.5 g/dL 30.5 (L) 30.8 (L) 29.7 (L)   RDW Latest Ref Range: 10.0 - 15.0 % 25.2 (H) 25.8 (H) 23.7 (H)   Diff Method Unknown Manual Differential Manual Differential Manual Differential   % Neutrophils Latest Units: % 35.0 35.5 55.5   % Lymphocytes Latest Units: % 41.0 44.5 27.5   % Monocytes Latest Units: % 21.0 13.6 11.0   % Eosinophils Latest Units: % 3.0 5.5 3.0   % Basophils Latest Units: % 0.0 0.9 1.5   % Metamyelocytes Latest Units: %   1.5   Nucleated RBCs Latest Ref Range: 0 /100 23 (H) 71 (H) 12 (H)   Absolute Neutrophil Latest Ref Range: 1.6 - 8.3 10e9/L 1.1 (L) 1.4 (L) 1.3 (L)   Absolute Lymphocytes Latest Ref Range: 0.8 - 5.3 10e9/L 1.3 1.7 0.7 (L)   Absolute Monocytes Latest Ref Range: 0.0 - 1.3 10e9/L 0.7 0.5 0.3   Absolute Eosinophils Latest Ref Range: 0.0 - 0.7 10e9/L 0.1 0.2 0.1   Absolute Basophils Latest Ref Range: 0.0 - 0.2 10e9/L 0.0 0.0 0.0   Absolute Metamyelocytes Latest Ref Range: 0 10e9/L   0.0   Absolute Nucleated RBC Unknown 0.7 2.8 0.3   Anisocytosis Unknown Marked Marked Marked   Poikilocytosis Unknown  Moderate    Polychromasia Unknown  Slight Slight   RBC Fragments Unknown Slight Slight Slight   Teardrop Cells Unknown Slight     Acanthocytes Unknown Slight Slight    Target Cells Unknown Slight Slight Slight   Elliptocytes Unknown Slight     Smudge Cells Unknown Present     Hypochromasia Unknown  Present Present   Platelet Estimate Unknown Automated count confirmed.  Giant platelets are present. Confirming automated cell count    % Retic Latest  Ref Range: 0.5 - 2.0 % 3.2 (H) 4.6 (H) 2.8 (H)   Absolute Retic Latest Ref Range: 25 - 95 10e9/L 84.0 112.8 (H) 69.4   INR Latest Ref Range: 0.86 - 1.14  2.02 (H)     Hep B Surface Agn Latest Ref Range: NR^Nonreactive  Nonreactive     Hepatitis B Surface Antibody Latest Ref Range: <8.00 m[IU]/mL 0.69     Hepatitis C Antibody Latest Ref Range: NR^Nonreactive  Nonreactive     EBV DNA Copies/mL Latest Ref Range: EBVNEG^EBV DNA Not Detected Copies/mL 647 (A) 1,997 (A) 4,467 (A)   EBV DNA Log of Copies Latest Ref Range: <2.7 Log_copies/mL 2.8 (H) 3.3 (H) 3.7 (H)   HCV RNA Quant IU/ml Latest Ref Range: HCVND^HCV RNA Not Detected [IU]/mL HCV RNA Not Detected     Log of HCV RNA Qt Latest Ref Range: <1.2 Log IU/mL Not Calculated

## 2021-06-24 NOTE — LETTER
"    6/24/2021         RE: Bonny Raymundo  [address]  [facility] [address]        Dear Colleague,    Thank you for referring your patient, Bonny Raymundo, to the [facility] [place]. Please see a copy of my visit note below.    Bonny Raymundo is a 75 year old who is being evaluated via a billable telephone visit.      What phone number would you like to be contacted at? [phone]    How would you like to obtain your AVS? [email or web address]    Vitals - Patient Reported  Weight (Patient Reported): 56.7 kg (125 lb)  Height (Patient Reported): 160 cm (5' 2.99\")  BMI (Based on Pt Reported Ht/Wt): 22.15  Pain Score: No Pain (0)    Fell 06/15/2021 has black eye/ cheek on right side and some swelling on bridge of nose.    Roque Erwin LPN    This is a 25 minute (including lab and records review) phone visit follow-up for Bonny and her 4+ years of aplastic anemia.  We had progressively reduced and then stopped her cyclosporine because of renal insufficiency and stopped her eltrombopag because of progressive modest hyperbilirubinemia.  She has been off both medicines for several months.    She had one fall recently where she tripped on her untied shoelace in her garden and hit her face which is worrisome because of her anticoagulation with apixaban.  It gave her a black eye and some swelling of the bridge of her nose but no persisting external bleeding.  Her energy is intermediate but she has had no fever chills or external bleeding.  She has no new bone pain or GI upset.  She has no respiratory or cardiac symptomatology.    Her white count and platelets are steady but her hemoglobin is down a bit over the last few months.  Her red cells are mildly hypochromic but macrocytic.  Her iron studies showed mixed findings with a ferritin down to 20 but adequate iron saturation.  Since her ferritin in the past was substantially higher it is possible that she has had progressive " iron loss with minor bleeding.  Additionally her chronic renal insufficiency could compromise her erythropoietin production even though when measured once in late May it was 280 and thus was probably appropriate for her hemoglobin at the time.    We will continue to hold the eltrombopag and cyclosporine but give her partial iron repletion parenterally and see if that resolves a bit her anemia.  Will check her counts and retake on the day of the iron infusion and again 3 weeks later and I will see her roughly 4 weeks from now.    Rafita Rogel MD    Professor medicine    Results for LISSETH PARIKH (MRN [id number]) as of 6/24/2021 16:26   Ref. Range 5/5/2021 14:32 5/27/2021 16:44 6/17/2021 14:28   Sodium Latest Ref Range: 133 - 144 mmol/L 138 141 143   Potassium Latest Ref Range: 3.4 - 5.3 mmol/L 4.0 3.8 3.9   Chloride Latest Ref Range: 94 - 109 mmol/L 107 109 112 (H)   Carbon Dioxide Latest Ref Range: 20 - 32 mmol/L 27 22 24   Urea Nitrogen Latest Ref Range: 7 - 30 mg/dL 30 30 23   Creatinine Latest Ref Range: 0.52 - 1.04 mg/dL 1.71 (H) 1.70 (H) 1.68 (H)   GFR Estimate Latest Ref Range: >60 mL/min/1.73_m2 28 (L) 29 (L) 29 (L)   GFR Estimate If Black Latest Ref Range: >60 mL/min/1.73_m2 33 (L) 33 (L) 33 (L)   Calcium Latest Ref Range: 8.5 - 10.1 mg/dL 8.0 (L) 8.4 (L) 8.3 (L)   Anion Gap Latest Ref Range: 3 - 14 mmol/L 4 10 7   Albumin Latest Ref Range: 3.4 - 5.0 g/dL 2.5 (L) 2.5 (L) 2.3 (L)   Protein Total Latest Ref Range: 6.8 - 8.8 g/dL 7.1 7.3 6.9   Bilirubin Total Latest Ref Range: 0.2 - 1.3 mg/dL 3.7 (H) 3.6 (H) 1.7 (H)   Alkaline Phosphatase Latest Ref Range: 40 - 150 U/L 83 90 81   ALT Latest Ref Range: 0 - 50 U/L 46 24 24   AST Latest Ref Range: 0 - 45 U/L 42 26 23   Bilirubin Direct Latest Ref Range: 0.0 - 0.2 mg/dL  0.9 (H)    Erythropoietin Latest Ref Range: 4 - 27 mU/mL  280 (H)    Ferritin Latest Ref Range: 8 - 252 ng/mL 33  20   Iron Latest Ref Range: 35 - 180 ug/dL   166   Iron Binding Cap  Latest Ref Range: 240 - 430 ug/dL   395   Iron Saturation Index Latest Ref Range: 15 - 46 %   42   Lactate Dehydrogenase Latest Ref Range: 81 - 234 U/L 287 (H) 291 (H) 301 (H)   Glucose Latest Ref Range: 70 - 99 mg/dL 112 (H) 109 (H) 148 (H)   WBC Latest Ref Range: 4.0 - 11.0 10e9/L 3.2 (L) 3.9 (L) 2.4 (L)   Hemoglobin Latest Ref Range: 11.7 - 15.7 g/dL 7.9 (L) 7.6 (L) 7.1 (L)   Hematocrit Latest Ref Range: 35.0 - 47.0 % 25.9 (L) 24.7 (L) 23.9 (L)   Platelet Count Latest Ref Range: 150 - 450 10e9/L 149 (L) 124 (L) 129 (L)   RBC Count Latest Ref Range: 3.8 - 5.2 10e12/L 2.60 (L) 2.48 (L) 2.47 (L)   MCV Latest Ref Range: 78 - 100 fl 100 100 97   MCH Latest Ref Range: 26.5 - 33.0 pg 30.4 30.6 28.7   MCHC Latest Ref Range: 31.5 - 36.5 g/dL 30.5 (L) 30.8 (L) 29.7 (L)   RDW Latest Ref Range: 10.0 - 15.0 % 25.2 (H) 25.8 (H) 23.7 (H)   Diff Method Unknown Manual Differential Manual Differential Manual Differential   % Neutrophils Latest Units: % 35.0 35.5 55.5   % Lymphocytes Latest Units: % 41.0 44.5 27.5   % Monocytes Latest Units: % 21.0 13.6 11.0   % Eosinophils Latest Units: % 3.0 5.5 3.0   % Basophils Latest Units: % 0.0 0.9 1.5   % Metamyelocytes Latest Units: %   1.5   Nucleated RBCs Latest Ref Range: 0 /100 23 (H) 71 (H) 12 (H)   Absolute Neutrophil Latest Ref Range: 1.6 - 8.3 10e9/L 1.1 (L) 1.4 (L) 1.3 (L)   Absolute Lymphocytes Latest Ref Range: 0.8 - 5.3 10e9/L 1.3 1.7 0.7 (L)   Absolute Monocytes Latest Ref Range: 0.0 - 1.3 10e9/L 0.7 0.5 0.3   Absolute Eosinophils Latest Ref Range: 0.0 - 0.7 10e9/L 0.1 0.2 0.1   Absolute Basophils Latest Ref Range: 0.0 - 0.2 10e9/L 0.0 0.0 0.0   Absolute Metamyelocytes Latest Ref Range: 0 10e9/L   0.0   Absolute Nucleated RBC Unknown 0.7 2.8 0.3   Anisocytosis Unknown Marked Marked Marked   Poikilocytosis Unknown  Moderate    Polychromasia Unknown  Slight Slight   RBC Fragments Unknown Slight Slight Slight   Teardrop Cells Unknown Slight     Acanthocytes Unknown Slight Slight     Target Cells Unknown Slight Slight Slight   Elliptocytes Unknown Slight     Smudge Cells Unknown Present     Hypochromasia Unknown  Present Present   Platelet Estimate Unknown Automated count confirmed.  Giant platelets are present. Confirming automated cell count    % Retic Latest Ref Range: 0.5 - 2.0 % 3.2 (H) 4.6 (H) 2.8 (H)   Absolute Retic Latest Ref Range: 25 - 95 10e9/L 84.0 112.8 (H) 69.4   INR Latest Ref Range: 0.86 - 1.14  2.02 (H)     Hep B Surface Agn Latest Ref Range: NR^Nonreactive  Nonreactive     Hepatitis B Surface Antibody Latest Ref Range: <8.00 m[IU]/mL 0.69     Hepatitis C Antibody Latest Ref Range: NR^Nonreactive  Nonreactive     EBV DNA Copies/mL Latest Ref Range: EBVNEG^EBV DNA Not Detected Copies/mL 647 (A) 1,997 (A) 4,467 (A)   EBV DNA Log of Copies Latest Ref Range: <2.7 Log_copies/mL 2.8 (H) 3.3 (H) 3.7 (H)   HCV RNA Quant IU/ml Latest Ref Range: HCVND^HCV RNA Not Detected [IU]/mL HCV RNA Not Detected     Log of HCV RNA Qt Latest Ref Range: <1.2 Log IU/mL Not Calculated         Again, thank you for allowing me to participate in the care of your patient.        Sincerely,        Rafita Rogel MD

## 2021-07-07 ENCOUNTER — HOSPITAL ENCOUNTER (OUTPATIENT)
Facility: CLINIC | Age: 76
Setting detail: SPECIMEN
Discharge: HOME | End: 2021-07-07
Attending: INTERNAL MEDICINE | Admitting: INTERNAL MEDICINE
Payer: COMMERCIAL

## 2021-07-07 ENCOUNTER — INFUSION THERAPY VISIT (OUTPATIENT)
Dept: INFUSION THERAPY | Facility: CLINIC | Age: 76
End: 2021-07-07
Attending: INTERNAL MEDICINE
Payer: COMMERCIAL

## 2021-07-07 ENCOUNTER — PATIENT OUTREACH (OUTPATIENT)
Dept: ONCOLOGY | Facility: CLINIC | Age: 76
End: 2021-07-07

## 2021-07-07 VITALS
SYSTOLIC BLOOD PRESSURE: 125 MMHG | TEMPERATURE: 98 F | OXYGEN SATURATION: 100 % | DIASTOLIC BLOOD PRESSURE: 63 MMHG | RESPIRATION RATE: 20 BRPM | HEART RATE: 84 BPM

## 2021-07-07 DIAGNOSIS — D61.9 APLASTIC ANEMIA (H): ICD-10-CM

## 2021-07-07 DIAGNOSIS — N18.4 CKD (CHRONIC KIDNEY DISEASE) STAGE 4, GFR 15-29 ML/MIN (H): Primary | ICD-10-CM

## 2021-07-07 DIAGNOSIS — D61.3 IDIOPATHIC APLASTIC ANEMIA (H): ICD-10-CM

## 2021-07-07 LAB
ABO + RH BLD: NORMAL
ABO + RH BLD: NORMAL
ALBUMIN SERPL-MCNC: 2.2 G/DL (ref 3.4–5)
ALP SERPL-CCNC: 65 U/L (ref 40–150)
ALT SERPL W P-5'-P-CCNC: 16 U/L (ref 0–50)
ANION GAP SERPL CALCULATED.3IONS-SCNC: 4 MMOL/L (ref 3–14)
ANISOCYTOSIS BLD QL SMEAR: ABNORMAL
AST SERPL W P-5'-P-CCNC: 18 U/L (ref 0–45)
BASOPHILS # BLD AUTO: 0 10E9/L (ref 0–0.2)
BASOPHILS NFR BLD AUTO: 0 %
BILIRUB SERPL-MCNC: 0.8 MG/DL (ref 0.2–1.3)
BLD GP AB SCN SERPL QL: NORMAL
BLOOD BANK CMNT PATIENT-IMP: NORMAL
BUN SERPL-MCNC: 29 MG/DL (ref 7–30)
CALCIUM SERPL-MCNC: 8 MG/DL (ref 8.5–10.1)
CHLORIDE SERPL-SCNC: 113 MMOL/L (ref 94–109)
CO2 SERPL-SCNC: 24 MMOL/L (ref 20–32)
CREAT SERPL-MCNC: 1.55 MG/DL (ref 0.52–1.04)
DIFFERENTIAL METHOD BLD: ABNORMAL
EOSINOPHIL # BLD AUTO: 0.3 10E9/L (ref 0–0.7)
EOSINOPHIL NFR BLD AUTO: 11 %
ERYTHROCYTE [DISTWIDTH] IN BLOOD BY AUTOMATED COUNT: 20.2 % (ref 10–15)
GFR SERPL CREATININE-BSD FRML MDRD: 32 ML/MIN/{1.73_M2}
GLUCOSE SERPL-MCNC: 115 MG/DL (ref 70–99)
HCT VFR BLD AUTO: 23.7 % (ref 35–47)
HGB BLD-MCNC: 6.9 G/DL (ref 11.7–15.7)
LYMPHOCYTES # BLD AUTO: 1 10E9/L (ref 0.8–5.3)
LYMPHOCYTES NFR BLD AUTO: 36 %
MCH RBC QN AUTO: 27.1 PG (ref 26.5–33)
MCHC RBC AUTO-ENTMCNC: 29.1 G/DL (ref 31.5–36.5)
MCV RBC AUTO: 93 FL (ref 78–100)
MICROCYTES BLD QL SMEAR: PRESENT
MONOCYTES # BLD AUTO: 0.4 10E9/L (ref 0–1.3)
MONOCYTES NFR BLD AUTO: 14 %
NEUTROPHILS # BLD AUTO: 1.1 10E9/L (ref 1.6–8.3)
NEUTROPHILS NFR BLD AUTO: 39 %
NRBC # BLD AUTO: 0.1 10*3/UL
NRBC BLD AUTO-RTO: 4 /100
PLATELET # BLD AUTO: 141 10E9/L (ref 150–450)
POLYCHROMASIA BLD QL SMEAR: SLIGHT
POTASSIUM SERPL-SCNC: 4 MMOL/L (ref 3.4–5.3)
PROT SERPL-MCNC: 6.2 G/DL (ref 6.8–8.8)
RBC # BLD AUTO: 2.55 10E12/L (ref 3.8–5.2)
RBC INCLUSIONS BLD: SLIGHT
RETICS # AUTO: 56.6 10E9/L (ref 25–95)
RETICS/RBC NFR AUTO: 2.2 % (ref 0.5–2)
SODIUM SERPL-SCNC: 141 MMOL/L (ref 133–144)
SPECIMEN EXP DATE BLD: NORMAL
WBC # BLD AUTO: 2.9 10E9/L (ref 4–11)

## 2021-07-07 PROCEDURE — 85045 AUTOMATED RETICULOCYTE COUNT: CPT | Performed by: INTERNAL MEDICINE

## 2021-07-07 PROCEDURE — 86900 BLOOD TYPING SEROLOGIC ABO: CPT | Performed by: INTERNAL MEDICINE

## 2021-07-07 PROCEDURE — 86901 BLOOD TYPING SEROLOGIC RH(D): CPT | Performed by: INTERNAL MEDICINE

## 2021-07-07 PROCEDURE — 250N000011 HC RX IP 250 OP 636: Performed by: INTERNAL MEDICINE

## 2021-07-07 PROCEDURE — 86850 RBC ANTIBODY SCREEN: CPT | Performed by: INTERNAL MEDICINE

## 2021-07-07 PROCEDURE — 85025 COMPLETE CBC W/AUTO DIFF WBC: CPT | Performed by: INTERNAL MEDICINE

## 2021-07-07 PROCEDURE — 258N000003 HC RX IP 258 OP 636: Performed by: INTERNAL MEDICINE

## 2021-07-07 PROCEDURE — 96365 THER/PROPH/DIAG IV INF INIT: CPT

## 2021-07-07 PROCEDURE — 80053 COMPREHEN METABOLIC PANEL: CPT | Performed by: INTERNAL MEDICINE

## 2021-07-07 RX ORDER — EPINEPHRINE 1 MG/ML
0.3 INJECTION, SOLUTION INTRAMUSCULAR; SUBCUTANEOUS EVERY 5 MIN PRN
Status: CANCELLED | OUTPATIENT
Start: 2021-07-07

## 2021-07-07 RX ORDER — NALOXONE HYDROCHLORIDE 0.4 MG/ML
0.2 INJECTION, SOLUTION INTRAMUSCULAR; INTRAVENOUS; SUBCUTANEOUS
Status: CANCELLED | OUTPATIENT
Start: 2021-07-07

## 2021-07-07 RX ORDER — METHYLPREDNISOLONE SODIUM SUCCINATE 125 MG/2ML
125 INJECTION, POWDER, LYOPHILIZED, FOR SOLUTION INTRAMUSCULAR; INTRAVENOUS
Status: CANCELLED
Start: 2021-07-07

## 2021-07-07 RX ORDER — ALBUTEROL SULFATE 90 UG/1
1-2 AEROSOL, METERED RESPIRATORY (INHALATION)
Status: CANCELLED
Start: 2021-07-07

## 2021-07-07 RX ORDER — MEPERIDINE HYDROCHLORIDE 25 MG/ML
25 INJECTION INTRAMUSCULAR; INTRAVENOUS; SUBCUTANEOUS EVERY 30 MIN PRN
Status: CANCELLED | OUTPATIENT
Start: 2021-07-07

## 2021-07-07 RX ORDER — HEPARIN SODIUM (PORCINE) LOCK FLUSH IV SOLN 100 UNIT/ML 100 UNIT/ML
5 SOLUTION INTRAVENOUS
Status: CANCELLED | OUTPATIENT
Start: 2021-07-07

## 2021-07-07 RX ORDER — ALBUTEROL SULFATE 0.83 MG/ML
2.5 SOLUTION RESPIRATORY (INHALATION)
Status: CANCELLED | OUTPATIENT
Start: 2021-07-07

## 2021-07-07 RX ORDER — DIPHENHYDRAMINE HYDROCHLORIDE 50 MG/ML
50 INJECTION INTRAMUSCULAR; INTRAVENOUS
Status: CANCELLED
Start: 2021-07-07

## 2021-07-07 RX ORDER — HEPARIN SODIUM,PORCINE 10 UNIT/ML
5 VIAL (ML) INTRAVENOUS
Status: CANCELLED | OUTPATIENT
Start: 2021-07-07

## 2021-07-07 RX ADMIN — SODIUM CHLORIDE 250 ML: 9 INJECTION, SOLUTION INTRAVENOUS at 14:40

## 2021-07-07 RX ADMIN — IRON SUCROSE 300 MG: 20 INJECTION, SOLUTION INTRAVENOUS at 14:41

## 2021-07-07 ASSESSMENT — PAIN SCALES - GENERAL: PAINLEVEL: NO PAIN (0)

## 2021-07-07 NOTE — PROGRESS NOTES
Oncology RN Care Coordination Note:     Incoming Call:  This writer received a voicemail from this patient stating she talked to someone last week who told her she was supposed to have 3 treatments of her iron supplement, but is only scheduled for 1.  Also wondering if her labs are scheduled correctly.     This writer has sent a message to the scheduling pool requesting they follow up with Bonny regarding her scheduling questions.  Writer included the lead clinic coordinators regarding Bonny's questions.      Outgoing Call:  This writer returned Bonny's call to let her know that a message has been sent to scheduling to complete her appointments, Bonny didn't answer when this writer called, a detailed message was left for her.    Karley Cunha RN BSN   [facility]  Nurse Coordinator

## 2021-07-07 NOTE — PROGRESS NOTES
Infusion Nursing Note:  Bonny Raymundo presents today for Venofer.    Patient seen by provider today: No   present during visit today: Not Applicable.    Note: Patient's first time receiving Venofer today.  Drug reviewed with the patient and possible side effects discussed with the patient.      Patient arrives to clinic sob.  She reports that this is her baseline.  She reports more fatigue lately. Heart rate somewhat irregular on assessment as well.  Heart rate initially between  bpm.  Resting heart rate at 84 prior to discharge.    Per orders, baseline CMP/CBC and Reticulocyte count drawn prior to Venofer infusion. Hbg came back at 6.9.  Patient denies the need for a blood transfusion.  Dr. Rogel paged to update him.    TORB:Dr. Rogel/ GALILEA Conner RN on 7/7/21 @ 1527:  - As ordered, patient to only have one dose of Venofer.  Will reassess her need for more doses later.  - Patient to call Dr. Rogel on Friday to update him on how she is feeling.  -Draw a type and cross today incase patient will need a transfusion by Friday.    Patient verbalized understanding of the plan.     Intravenous Access:  Peripheral IV placed.    Treatment Conditions:  Lab Results   Component Value Date    HGB 6.9 07/07/2021     Lab Results   Component Value Date    WBC 2.9 07/07/2021      Lab Results   Component Value Date    ANEU 1.3 06/17/2021     Lab Results   Component Value Date     07/07/2021      Lab Results   Component Value Date     07/07/2021                   Lab Results   Component Value Date    POTASSIUM 4.0 07/07/2021           Lab Results   Component Value Date    MAG 2.4 08/13/2020            Lab Results   Component Value Date    CR 1.55 07/07/2021                   Lab Results   Component Value Date    LEO 8.0 07/07/2021                Lab Results   Component Value Date    BILITOTAL 0.8 07/07/2021           Lab Results   Component Value Date    ALBUMIN 2.2 07/07/2021                     Lab Results   Component Value Date    ALT 16 07/07/2021           Lab Results   Component Value Date    AST 18 07/07/2021       Results reviewed, labs MET treatment parameters, ok to proceed with treatment.      Post Infusion Assessment:  Patient tolerated infusion without incident.  Blood return noted pre and post infusion.  Site patent and intact, free from redness, edema or discomfort.  No evidence of extravasations.  Access discontinued per protocol.       Discharge Plan:   Patient declined prescription refills.  Discharge instructions reviewed with: Patient.  Patient and/or family verbalized understanding of discharge instructions and all questions answered.  AVS to patient via [employer].  Patient will return 7/21/21  For lab draw for next appointment.   Patient discharged in stable condition accompanied by: self.  Departure Mode: Ambulatory.      Sue Conner RN

## 2021-07-08 DIAGNOSIS — I82.412 ACUTE DEEP VEIN THROMBOSIS (DVT) OF FEMORAL VEIN OF LEFT LOWER EXTREMITY (H): ICD-10-CM

## 2021-07-08 DIAGNOSIS — N18.4 CKD (CHRONIC KIDNEY DISEASE) STAGE 4, GFR 15-29 ML/MIN (H): ICD-10-CM

## 2021-07-08 DIAGNOSIS — D61.9 APLASTIC ANEMIA (H): ICD-10-CM

## 2021-07-08 RX ORDER — APIXABAN 5 MG/1
TABLET, FILM COATED ORAL
Qty: 180 TABLET | Refills: 1 | Status: SHIPPED | OUTPATIENT
Start: 2021-07-08 | End: 2021-12-15

## 2021-07-08 NOTE — TELEPHONE ENCOUNTER
Eliquis 5 mg tabs   Last prescribing provider: Dr Rogel     Last clinic visit date: 6/24/21 Dr Rogel     Any missed appointments or no-shows since last clinic visit?: No    Recommendations for requested medication (if none, N/A): N/A    Next clinic visit date: 7/29 Dr Rogel     Any other pertinent information (if none, N/A): N/A

## 2021-07-09 ENCOUNTER — PATIENT OUTREACH (OUTPATIENT)
Dept: ONCOLOGY | Facility: CLINIC | Age: 76
End: 2021-07-09

## 2021-07-09 NOTE — PROGRESS NOTES
Oncology RN Care Coordination Note:     Outgoing Call:  This writer received a call from Bonny stating she was returning a call from Dr. Rogel yesterday.  She said he called to see how she was feeling.  She said she is feeling fine, feeling about the same as she was prior to the iron infusions, states she can wait to follow up until her next appointments.  This writer has updated Dr. Rogel.  No further questions or concerns at this time.    Karley Cunha, RN BSN   [facility]  Nurse Coordinator

## 2021-07-21 ENCOUNTER — HOSPITAL ENCOUNTER (OUTPATIENT)
Facility: CLINIC | Age: 76
Discharge: HOME | End: 2021-07-21
Attending: INTERNAL MEDICINE | Admitting: INTERNAL MEDICINE
Payer: COMMERCIAL

## 2021-07-21 ENCOUNTER — LAB (OUTPATIENT)
Dept: INFUSION THERAPY | Facility: CLINIC | Age: 76
End: 2021-07-21
Attending: INTERNAL MEDICINE
Payer: COMMERCIAL

## 2021-07-21 DIAGNOSIS — D61.9 APLASTIC ANEMIA (H): ICD-10-CM

## 2021-07-21 LAB
ALBUMIN SERPL-MCNC: 2.6 G/DL (ref 3.4–5)
ALP SERPL-CCNC: 77 U/L (ref 40–150)
ALT SERPL W P-5'-P-CCNC: 17 U/L (ref 0–50)
ANION GAP SERPL CALCULATED.3IONS-SCNC: 3 MMOL/L (ref 3–14)
AST SERPL W P-5'-P-CCNC: 18 U/L (ref 0–45)
BASOPHILS # BLD AUTO: 0 10E3/UL (ref 0–0.2)
BASOPHILS NFR BLD AUTO: 0 %
BILIRUB SERPL-MCNC: 1 MG/DL (ref 0.2–1.3)
BUN SERPL-MCNC: 28 MG/DL (ref 7–30)
CALCIUM SERPL-MCNC: 8.7 MG/DL (ref 8.5–10.1)
CHLORIDE BLD-SCNC: 112 MMOL/L (ref 94–109)
CO2 SERPL-SCNC: 26 MMOL/L (ref 20–32)
CREAT SERPL-MCNC: 1.53 MG/DL (ref 0.52–1.04)
ELLIPTOCYTES BLD QL SMEAR: SLIGHT
EOSINOPHIL # BLD AUTO: 0.2 10E3/UL (ref 0–0.7)
EOSINOPHIL NFR BLD AUTO: 8 %
ERYTHROCYTE [DISTWIDTH] IN BLOOD BY AUTOMATED COUNT: 27.2 % (ref 10–15)
FRAGMENTS BLD QL SMEAR: SLIGHT
GFR SERPL CREATININE-BSD FRML MDRD: 33 ML/MIN/1.73M2
GLUCOSE BLD-MCNC: 119 MG/DL (ref 70–99)
HCT VFR BLD AUTO: 28.6 % (ref 35–47)
HGB BLD-MCNC: 8.5 G/DL (ref 11.7–15.7)
IMM GRANULOCYTES # BLD: 0 10E3/UL
IMM GRANULOCYTES NFR BLD: 0 %
LYMPHOCYTES # BLD AUTO: 1.3 10E3/UL (ref 0.8–5.3)
LYMPHOCYTES NFR BLD AUTO: 45 %
MCH RBC QN AUTO: 28.5 PG (ref 26.5–33)
MCHC RBC AUTO-ENTMCNC: 29.7 G/DL (ref 31.5–36.5)
MCV RBC AUTO: 96 FL (ref 78–100)
MONOCYTES # BLD AUTO: 0.5 10E3/UL (ref 0–1.3)
MONOCYTES NFR BLD AUTO: 17 %
NEUTROPHILS # BLD AUTO: 0.9 10E3/UL (ref 1.6–8.3)
NEUTROPHILS NFR BLD AUTO: 30 %
NRBC # BLD AUTO: 0.1 10E3/UL
NRBC BLD AUTO-RTO: 2 /100
PLAT MORPH BLD: ABNORMAL
PLATELET # BLD AUTO: 114 10E3/UL (ref 150–450)
POTASSIUM BLD-SCNC: 4.2 MMOL/L (ref 3.4–5.3)
PROT SERPL-MCNC: 7 G/DL (ref 6.8–8.8)
RBC # BLD AUTO: 2.98 10E6/UL (ref 3.8–5.2)
RBC MORPH BLD: ABNORMAL
RETICS # AUTO: 0.07 10E6/UL (ref 0.03–0.1)
RETICS/RBC NFR AUTO: 2.4 % (ref 0.5–2)
SODIUM SERPL-SCNC: 141 MMOL/L (ref 133–144)
WBC # BLD AUTO: 3 10E3/UL (ref 4–11)

## 2021-07-21 PROCEDURE — 85025 COMPLETE CBC W/AUTO DIFF WBC: CPT

## 2021-07-21 PROCEDURE — 80053 COMPREHEN METABOLIC PANEL: CPT

## 2021-07-21 PROCEDURE — 36415 COLL VENOUS BLD VENIPUNCTURE: CPT

## 2021-07-21 PROCEDURE — 85045 AUTOMATED RETICULOCYTE COUNT: CPT

## 2021-07-28 ENCOUNTER — TELEPHONE (OUTPATIENT)
Dept: INFUSION THERAPY | Facility: CLINIC | Age: 76
End: 2021-07-28

## 2021-07-28 NOTE — TELEPHONE ENCOUNTER
"----- Message from Marlena Figueroa sent at 7/10/2021 10:31 PM CDT -----  Regarding: FW: [facility] patient -- venofer infusions  Appointments Requested:    Labs needed: No.  If \"YES\" type of access: NOT APPLICABLE.    Provider appointment needed: No.    Infusion appointment needed: Yes.  If \"YES type of infusion: Velcade.    Other cancer clinic appointments needed: No.  If \"YES\" type of visit: NOT APPLICABLE.    Dates needed: Next two weeks her day 7 and 14...  did her first one there on 7/7.    Ordering provider: [facility]    Has the patient been to this site before: Yes.    Additional scheduling notes: NOT APPLICABLE.              ----- Message -----  From: Karley Cunha RN  Sent: 7/7/2021  11:01 AM CDT  To: Marlena Ramirez  Subject: RE: [facility] patient -- venofer infusions        Great thanks!   ----- Message -----  From: Marlena Figueroa  Sent: 7/7/2021  11:00 AM CDT  To: Karley Ramirez RN  Subject: RE: [facility] patient -- venofer infusions        Looks like her appt is at [facility] today - I am not sure if they only do one appt at a time? I can send a message to them to ask if they can touch base with her today.   ----- Message -----  From: Karley Cunha RN  Sent: 7/7/2021  10:51 AM CDT  To: Marlena Ramirez, #  Subject: [facility] patient -- venofer infusions            Team,      Please complete patient's scheduling.  She left a voicemail asking about her schedule.  She was told by someone else that she is to have 3 infusions of venofer and she is only scheduled for 1.     She is also wondering about her lab appointments - if they are correct?  Please follow up with her regarding her schedule.    Thank you!  Karley"

## 2021-07-28 NOTE — PROGRESS NOTES
Received message to schedule next iron infusions here at [facility]. Note indicated patient had received one dose of Venofer 300 on 7/7/21.  Patient did not recall that more doses were needed. She wants to talk to Dr. Rogel at next visit to discuss.

## 2021-07-29 ENCOUNTER — APPOINTMENT (OUTPATIENT)
Dept: LAB | Facility: CLINIC | Age: 76
End: 2021-07-29
Attending: INTERNAL MEDICINE
Payer: COMMERCIAL

## 2021-07-29 ENCOUNTER — OFFICE VISIT (OUTPATIENT)
Dept: TRANSPLANT | Facility: CLINIC | Age: 76
End: 2021-07-29
Attending: INTERNAL MEDICINE
Payer: COMMERCIAL

## 2021-07-29 VITALS
WEIGHT: 119 LBS | TEMPERATURE: 97.9 F | BODY MASS INDEX: 21.08 KG/M2 | SYSTOLIC BLOOD PRESSURE: 156 MMHG | DIASTOLIC BLOOD PRESSURE: 75 MMHG | OXYGEN SATURATION: 97 % | RESPIRATION RATE: 16 BRPM | HEART RATE: 91 BPM

## 2021-07-29 DIAGNOSIS — D61.9 APLASTIC ANEMIA (H): ICD-10-CM

## 2021-07-29 DIAGNOSIS — D61.3 IDIOPATHIC APLASTIC ANEMIA (H): ICD-10-CM

## 2021-07-29 DIAGNOSIS — D50.8 OTHER IRON DEFICIENCY ANEMIA: ICD-10-CM

## 2021-07-29 LAB
ALBUMIN SERPL-MCNC: 2.5 G/DL (ref 3.4–5)
ALP SERPL-CCNC: 78 U/L (ref 40–150)
ALT SERPL W P-5'-P-CCNC: 16 U/L (ref 0–50)
ANION GAP SERPL CALCULATED.3IONS-SCNC: 4 MMOL/L (ref 3–14)
AST SERPL W P-5'-P-CCNC: 20 U/L (ref 0–45)
BASOPHILS # BLD MANUAL: 0 10E3/UL (ref 0–0.2)
BASOPHILS NFR BLD MANUAL: 1 %
BILIRUB SERPL-MCNC: 0.6 MG/DL (ref 0.2–1.3)
BUN SERPL-MCNC: 28 MG/DL (ref 7–30)
CALCIUM SERPL-MCNC: 8.7 MG/DL (ref 8.5–10.1)
CHLORIDE BLD-SCNC: 110 MMOL/L (ref 94–109)
CO2 SERPL-SCNC: 27 MMOL/L (ref 20–32)
CREAT SERPL-MCNC: 1.52 MG/DL (ref 0.52–1.04)
DACRYOCYTES BLD QL SMEAR: SLIGHT
EOSINOPHIL # BLD MANUAL: 0.1 10E3/UL (ref 0–0.7)
EOSINOPHIL NFR BLD MANUAL: 5 %
ERYTHROCYTE [DISTWIDTH] IN BLOOD BY AUTOMATED COUNT: 26.8 % (ref 10–15)
FERRITIN SERPL-MCNC: 23 NG/ML (ref 8–252)
GFR SERPL CREATININE-BSD FRML MDRD: 33 ML/MIN/1.73M2
GLUCOSE BLD-MCNC: 103 MG/DL (ref 70–99)
HCT VFR BLD AUTO: 28.5 % (ref 35–47)
HGB BLD-MCNC: 8.7 G/DL (ref 11.7–15.7)
HOLD SPECIMEN: NORMAL
LYMPHOCYTES # BLD MANUAL: 1.6 10E3/UL (ref 0.8–5.3)
LYMPHOCYTES NFR BLD MANUAL: 55 %
MCH RBC QN AUTO: 29.2 PG (ref 26.5–33)
MCHC RBC AUTO-ENTMCNC: 30.5 G/DL (ref 31.5–36.5)
MCV RBC AUTO: 96 FL (ref 78–100)
MONOCYTES # BLD MANUAL: 0.4 10E3/UL (ref 0–1.3)
MONOCYTES NFR BLD MANUAL: 15 %
NEUTROPHILS # BLD MANUAL: 0.7 10E3/UL (ref 1.6–8.3)
NEUTROPHILS NFR BLD MANUAL: 24 %
PLAT MORPH BLD: ABNORMAL
PLATELET # BLD AUTO: 90 10E3/UL (ref 150–450)
POTASSIUM BLD-SCNC: 4.2 MMOL/L (ref 3.4–5.3)
PROT SERPL-MCNC: 6.8 G/DL (ref 6.8–8.8)
RBC # BLD AUTO: 2.98 10E6/UL (ref 3.8–5.2)
RBC MORPH BLD: ABNORMAL
RETICS # AUTO: 0.05 10E6/UL (ref 0.03–0.1)
RETICS/RBC NFR AUTO: 1.6 % (ref 0.5–2)
SODIUM SERPL-SCNC: 141 MMOL/L (ref 133–144)
URATE SERPL-MCNC: 6.2 MG/DL (ref 2.6–6)
WBC # BLD AUTO: 2.9 10E3/UL (ref 4–11)

## 2021-07-29 PROCEDURE — 82728 ASSAY OF FERRITIN: CPT | Performed by: INTERNAL MEDICINE

## 2021-07-29 PROCEDURE — 82040 ASSAY OF SERUM ALBUMIN: CPT | Performed by: INTERNAL MEDICINE

## 2021-07-29 PROCEDURE — 36415 COLL VENOUS BLD VENIPUNCTURE: CPT | Performed by: INTERNAL MEDICINE

## 2021-07-29 PROCEDURE — 85027 COMPLETE CBC AUTOMATED: CPT | Performed by: INTERNAL MEDICINE

## 2021-07-29 PROCEDURE — 84550 ASSAY OF BLOOD/URIC ACID: CPT | Performed by: INTERNAL MEDICINE

## 2021-07-29 PROCEDURE — 85045 AUTOMATED RETICULOCYTE COUNT: CPT | Performed by: INTERNAL MEDICINE

## 2021-07-29 PROCEDURE — 99214 OFFICE O/P EST MOD 30 MIN: CPT | Performed by: INTERNAL MEDICINE

## 2021-07-29 ASSESSMENT — PAIN SCALES - GENERAL: PAINLEVEL: NO PAIN (0)

## 2021-07-29 NOTE — PROGRESS NOTES
BP (!) 156/75   Pulse 91   Temp 97.9  F (36.6  C) (Oral)   Resp 16   Wt 54 kg (119 lb)   SpO2 97%   BMI 21.08 kg/m    Wt Readings from Last 4 Encounters:   07/29/21 54 kg (119 lb)   05/27/21 56.7 kg (124 lb 14.4 oz)   03/24/21 57.2 kg (126 lb)   03/17/21 56.7 kg (125 lb)     Bonny returns to follow-up her aplastic anemia.  She had some renal insufficiency developing as a consequence of her chronic cyclosporine therapy and we discontinued cyclosporine in May. Because of persistent modest hyperbilirubinemia and transaminase elevation we also discontinued eltrombopag some months ago.  Her renal function has stabilized with a creatinine of 1.5 (down from 2.0 at the peak) and her liver functions have fully normalized.    In early July she had an infusion of iron because of falling ferritin and falling MCHC suggesting modest iron depletion.  The consideration was also that she may benefit from erythropoietin supplementation after the iron was given because her EPO level though elevated, was inappropriately low, likely as a consequence of her renal insufficiency.    Recently she has felt quite well and thinks her energy has improved since the iron infusion.  Her hemoglobin is a bit higher over the last 3 weeks since then.  She has had no fever chills or rash.  She has had no external bleeding or bruising.  There is no respiratory, GI or  symptomatology.    Her exam shows her systolic blood pressure slightly elevated on arrival though that is uncommon from other visits.  Her weight is down 2 kg from March.  She has no right lower extremity edema but her left is still slightly puffy from her previous nonocclusive thrombus.  There is no calf tenderness.    She has no bruises or notable petechiae in her mouth or conjunctiva.  There is no notable petechiae on her legs either.  Her oral mucosa is clear and her lungs are clear as well.  Her heart tones are regular without a gallop but there is a soft ejection murmur.  Her  abdomen is soft and nontender without masses or hepatosplenomegaly.  Laboratory testing shows slightly higher HgB and stable reticulocyte counts; still low ferritin and renal insufficiency..    It is likely, if her renal function stays abnormal that she may benefit from erythropoietin supplementation to see if we can further stimulate her red throat red cell production.    Her white count and platelet count today are acceptable but off all immunosuppression will still have to monitor them cautiously.    With her ferritin still low retic uric acid and chemistries today  We will add allopurinol 150 mg/day  and give one more iron infusion.     We will later make further decisions whether EPO supplementation would be of benefit.   Rafita Rogel MD    Professor of medicine    Results for LISSETH PARIKH (MRN [id number]) as of 7/29/2021 20:43   Ref. Range 7/7/2021 14:30 7/7/2021 16:10 7/21/2021 13:58 7/29/2021 17:02   Sodium Latest Ref Range: 133 - 144 mmol/L 141  141 141   Potassium Latest Ref Range: 3.4 - 5.3 mmol/L 4.0  4.2 4.2   Chloride Latest Ref Range: 94 - 109 mmol/L 113 (H)  112 (H) 110 (H)   Carbon Dioxide Latest Ref Range: 20 - 32 mmol/L 24  26 27   Urea Nitrogen Latest Ref Range: 7 - 30 mg/dL 29  28 28   Creatinine Latest Ref Range: 0.52 - 1.04 mg/dL 1.55 (H)  1.53 (H) 1.52 (H)   GFR Estimate Latest Ref Range: >60 mL/min/1.73m2 32 (L)  33 (L) 33 (L)   GFR Estimate If Black Latest Ref Range: >60 mL/min/1.73_m2 37 (L)      Calcium Latest Ref Range: 8.5 - 10.1 mg/dL 8.0 (L)  8.7 8.7   Anion Gap Latest Ref Range: 3 - 14 mmol/L 4  3 4   Albumin Latest Ref Range: 3.4 - 5.0 g/dL 2.2 (L)  2.6 (L) 2.5 (L)   Protein Total Latest Ref Range: 6.8 - 8.8 g/dL 6.2 (L)  7.0 6.8   Bilirubin Total Latest Ref Range: 0.2 - 1.3 mg/dL 0.8  1.0 0.6   Alkaline Phosphatase Latest Ref Range: 40 - 150 U/L 65      Alkaline Phosphatase Latest Ref Range: 40 - 150 U/L   77 78   ALT Latest Ref Range: 0 - 50 U/L 16  17 16   AST  Latest Ref Range: 0 - 45 U/L 18  18 20   Ferritin Latest Ref Range: 8 - 252 ng/mL    23   Uric Acid Latest Ref Range: 2.6 - 6.0 mg/dL    6.2 (H)   Glucose Latest Ref Range: 70 - 99 mg/dL 115 (H)  119 (H) 103 (H)   WBC Latest Ref Range: 4.0 - 11.0 10e3/uL 2.9 (L)  3.0 (L) 2.9 (L)   Hemoglobin Latest Ref Range: 11.7 - 15.7 g/dL 6.9 (LL)  8.5 (L) 8.7 (L)   Hematocrit Latest Ref Range: 35.0 - 47.0 % 23.7 (L)  28.6 (L) 28.5 (L)   Platelet Count Latest Ref Range: 150 - 450 10e3/uL 141 (L)  114 (L) 90 (L)   RBC Count Latest Ref Range: 3.80 - 5.20 10e6/uL 2.55 (L)  2.98 (L) 2.98 (L)   MCV Latest Ref Range: 78 - 100 fL 93  96 96   MCH Latest Ref Range: 26.5 - 33.0 pg 27.1  28.5 29.2   MCHC Latest Ref Range: 31.5 - 36.5 g/dL 29.1 (L)  29.7 (L) 30.5 (L)   RDW Latest Ref Range: 10.0 - 15.0 % 20.2 (H)  27.2 (H) 26.8 (H)   Diff Method Unknown Manual Differential      % Neutrophils Latest Units: % 39.0  30 24   % Lymphocytes Latest Units: % 36.0  45 55   % Monocytes Latest Units: % 14.0  17 15   % Eosinophils Latest Units: % 11.0  8 5   Absolute Basophils Latest Ref Range: 0.0 - 0.2 10e3/uL   0.0    Absolute Basophils Latest Ref Range: 0.0 - 0.2 10e3/uL    0.0   % Basophils Latest Units: % 0.0  0 1   Nucleated RBCs Latest Ref Range: 0 /100 4 (H)      Absolute Neutrophil Latest Ref Range: 1.6 - 8.3 10e3/uL 1.1 (L)   0.7 (L)   Absolute Lymphocytes Latest Ref Range: 0.8 - 5.3 10e3/uL 1.0   1.6   Absolute Monocytes Latest Ref Range: 0.0 - 1.3 10e3/uL 0.4   0.4   Absolute Eosinophils Latest Ref Range: 0.0 - 0.7 10e3/uL 0.3   0.1   Absolute Eosinophils Latest Ref Range: 0.0 - 0.7 10e3/uL   0.2    Absolute Basophils Latest Ref Range: 0.0 - 0.2 10e9/L 0.0      Absolute Immature Granulocytes Latest Ref Range: <=0.0 10e3/uL   0.0    Absolute Lymphocytes Latest Ref Range: 0.8 - 5.3 10e3/uL   1.3    Absolute Monocytes Latest Ref Range: 0.0 - 1.3 10e3/uL   0.5    % Immature Granulocytes Latest Units: %   0    Absolute Neutrophils Latest Ref  Range: 1.6 - 8.3 10e3/uL   0.9 (L)    Absolute Nucleated RBC Unknown 0.1      Absolute NRBCs Latest Units: 10e3/uL   0.1    NRBCs per 100 WBC Latest Ref Range: <1 /100   2 (H)    RBC Morphology Unknown   Confirmed RBC Indices Confirmed RBC Indices   Platelet Morphology Latest Ref Range: Automated Count Confirmed. Platelet morphology is normal.    Automated Count Confirmed. Platelet morphology is normal. Automated Count Confirmed. Platelet morphology is normal.   Anisocytosis Unknown Moderate      Polychromasia Unknown Slight      RBC Fragments Latest Ref Range: None Seen  Slight  Slight (A)    Teardrop Cells Latest Ref Range: None Seen     Slight (A)   Elliptocytes Latest Ref Range: None Seen    Slight (A)    Microcytes Unknown Present      % Retic Latest Ref Range: 0.5 - 2.0 % 2.2 (H)  2.4 (H) 1.6   Absolute Retic Latest Ref Range: 0.025 - 0.095 10e6/uL 56.6  0.072 0.048   ABO Unknown  A     RH(D) Unknown  Pos     Antibody Screen Unknown  Neg     Test Valid Only At Latest Units:      [facility]     Specimen Expires Unknown  07/10/2021

## 2021-07-29 NOTE — NURSING NOTE
Chief Complaint   Patient presents with     Blood Draw     Labs drawn via VPT by RN in lab. VS taken.     Labs collected from venipuncture by RN. Vitals taken. Checked in for appointment(s). JIC tubes drawn per patient request.    Becka LEES RN PHN BSN  BMT/Oncology Lab

## 2021-07-29 NOTE — LETTER
7/29/2021         RE: Bonny Raymundo  [address]  [facility] [address]        Dear Colleague,    Thank you for referring your patient, Bonny Raymundo, to the [facility] [place]. Please see a copy of my visit note below.    BP (!) 156/75   Pulse 91   Temp 97.9  F (36.6  C) (Oral)   Resp 16   Wt 54 kg (119 lb)   SpO2 97%   BMI 21.08 kg/m    Wt Readings from Last 4 Encounters:   07/29/21 54 kg (119 lb)   05/27/21 56.7 kg (124 lb 14.4 oz)   03/24/21 57.2 kg (126 lb)   03/17/21 56.7 kg (125 lb)     Bonny returns to follow-up her aplastic anemia.  She had some renal insufficiency developing as a consequence of her chronic cyclosporine therapy and we discontinued cyclosporine in May. Because of persistent modest hyperbilirubinemia and transaminase elevation we also discontinued eltrombopag some months ago.  Her renal function has stabilized with a creatinine of 1.5 (down from 2.0 at the peak) and her liver functions have fully normalized.    In early July she had an infusion of iron because of falling ferritin and falling MCHC suggesting modest iron depletion.  The consideration was also that she may benefit from erythropoietin supplementation after the iron was given because her EPO level though elevated, was inappropriately low, likely as a consequence of her renal insufficiency.    Recently she has felt quite well and thinks her energy has improved since the iron infusion.  Her hemoglobin is a bit higher over the last 3 weeks since then.  She has had no fever chills or rash.  She has had no external bleeding or bruising.  There is no respiratory, GI or  symptomatology.    Her exam shows her systolic blood pressure slightly elevated on arrival though that is uncommon from other visits.  Her weight is down 2 kg from March.  She has no right lower extremity edema but her left is still slightly puffy from her previous nonocclusive thrombus.  There is no  calf tenderness.    She has no bruises or notable petechiae in her mouth or conjunctiva.  There is no notable petechiae on her legs either.  Her oral mucosa is clear and her lungs are clear as well.  Her heart tones are regular without a gallop but there is a soft ejection murmur.  Her abdomen is soft and nontender without masses or hepatosplenomegaly.  Laboratory testing shows slightly higher HgB and stable reticulocyte counts; still low ferritin and renal insufficiency..    It is likely, if her renal function stays abnormal that she may benefit from erythropoietin supplementation to see if we can further stimulate her red throat red cell production.    Her white count and platelet count today are acceptable but off all immunosuppression will still have to monitor them cautiously.    With her ferritin still low retic uric acid and chemistries today  We will add allopurinol 150 mg/day  and give one more iron infusion.     We will later make further decisions whether EPO supplementation would be of benefit.   Rafita Rogel MD    Professor of medicine    Results for JL, LISSETH JL PASHA (MRN [id number]) as of 7/29/2021 20:43   Ref. Range 7/7/2021 14:30 7/7/2021 16:10 7/21/2021 13:58 7/29/2021 17:02   Sodium Latest Ref Range: 133 - 144 mmol/L 141  141 141   Potassium Latest Ref Range: 3.4 - 5.3 mmol/L 4.0  4.2 4.2   Chloride Latest Ref Range: 94 - 109 mmol/L 113 (H)  112 (H) 110 (H)   Carbon Dioxide Latest Ref Range: 20 - 32 mmol/L 24  26 27   Urea Nitrogen Latest Ref Range: 7 - 30 mg/dL 29  28 28   Creatinine Latest Ref Range: 0.52 - 1.04 mg/dL 1.55 (H)  1.53 (H) 1.52 (H)   GFR Estimate Latest Ref Range: >60 mL/min/1.73m2 32 (L)  33 (L) 33 (L)   GFR Estimate If Black Latest Ref Range: >60 mL/min/1.73_m2 37 (L)      Calcium Latest Ref Range: 8.5 - 10.1 mg/dL 8.0 (L)  8.7 8.7   Anion Gap Latest Ref Range: 3 - 14 mmol/L 4  3 4   Albumin Latest Ref Range: 3.4 - 5.0 g/dL 2.2 (L)  2.6 (L) 2.5 (L)   Protein Total  Latest Ref Range: 6.8 - 8.8 g/dL 6.2 (L)  7.0 6.8   Bilirubin Total Latest Ref Range: 0.2 - 1.3 mg/dL 0.8  1.0 0.6   Alkaline Phosphatase Latest Ref Range: 40 - 150 U/L 65      Alkaline Phosphatase Latest Ref Range: 40 - 150 U/L   77 78   ALT Latest Ref Range: 0 - 50 U/L 16  17 16   AST Latest Ref Range: 0 - 45 U/L 18  18 20   Ferritin Latest Ref Range: 8 - 252 ng/mL    23   Uric Acid Latest Ref Range: 2.6 - 6.0 mg/dL    6.2 (H)   Glucose Latest Ref Range: 70 - 99 mg/dL 115 (H)  119 (H) 103 (H)   WBC Latest Ref Range: 4.0 - 11.0 10e3/uL 2.9 (L)  3.0 (L) 2.9 (L)   Hemoglobin Latest Ref Range: 11.7 - 15.7 g/dL 6.9 (LL)  8.5 (L) 8.7 (L)   Hematocrit Latest Ref Range: 35.0 - 47.0 % 23.7 (L)  28.6 (L) 28.5 (L)   Platelet Count Latest Ref Range: 150 - 450 10e3/uL 141 (L)  114 (L) 90 (L)   RBC Count Latest Ref Range: 3.80 - 5.20 10e6/uL 2.55 (L)  2.98 (L) 2.98 (L)   MCV Latest Ref Range: 78 - 100 fL 93  96 96   MCH Latest Ref Range: 26.5 - 33.0 pg 27.1  28.5 29.2   MCHC Latest Ref Range: 31.5 - 36.5 g/dL 29.1 (L)  29.7 (L) 30.5 (L)   RDW Latest Ref Range: 10.0 - 15.0 % 20.2 (H)  27.2 (H) 26.8 (H)   Diff Method Unknown Manual Differential      % Neutrophils Latest Units: % 39.0  30 24   % Lymphocytes Latest Units: % 36.0  45 55   % Monocytes Latest Units: % 14.0  17 15   % Eosinophils Latest Units: % 11.0  8 5   Absolute Basophils Latest Ref Range: 0.0 - 0.2 10e3/uL   0.0    Absolute Basophils Latest Ref Range: 0.0 - 0.2 10e3/uL    0.0   % Basophils Latest Units: % 0.0  0 1   Nucleated RBCs Latest Ref Range: 0 /100 4 (H)      Absolute Neutrophil Latest Ref Range: 1.6 - 8.3 10e3/uL 1.1 (L)   0.7 (L)   Absolute Lymphocytes Latest Ref Range: 0.8 - 5.3 10e3/uL 1.0   1.6   Absolute Monocytes Latest Ref Range: 0.0 - 1.3 10e3/uL 0.4   0.4   Absolute Eosinophils Latest Ref Range: 0.0 - 0.7 10e3/uL 0.3   0.1   Absolute Eosinophils Latest Ref Range: 0.0 - 0.7 10e3/uL   0.2    Absolute Basophils Latest Ref Range: 0.0 - 0.2 10e9/L 0.0       Absolute Immature Granulocytes Latest Ref Range: <=0.0 10e3/uL   0.0    Absolute Lymphocytes Latest Ref Range: 0.8 - 5.3 10e3/uL   1.3    Absolute Monocytes Latest Ref Range: 0.0 - 1.3 10e3/uL   0.5    % Immature Granulocytes Latest Units: %   0    Absolute Neutrophils Latest Ref Range: 1.6 - 8.3 10e3/uL   0.9 (L)    Absolute Nucleated RBC Unknown 0.1      Absolute NRBCs Latest Units: 10e3/uL   0.1    NRBCs per 100 WBC Latest Ref Range: <1 /100   2 (H)    RBC Morphology Unknown   Confirmed RBC Indices Confirmed RBC Indices   Platelet Morphology Latest Ref Range: Automated Count Confirmed. Platelet morphology is normal.    Automated Count Confirmed. Platelet morphology is normal. Automated Count Confirmed. Platelet morphology is normal.   Anisocytosis Unknown Moderate      Polychromasia Unknown Slight      RBC Fragments Latest Ref Range: None Seen  Slight  Slight (A)    Teardrop Cells Latest Ref Range: None Seen     Slight (A)   Elliptocytes Latest Ref Range: None Seen    Slight (A)    Microcytes Unknown Present      % Retic Latest Ref Range: 0.5 - 2.0 % 2.2 (H)  2.4 (H) 1.6   Absolute Retic Latest Ref Range: 0.025 - 0.095 10e6/uL 56.6  0.072 0.048   ABO Unknown  A     RH(D) Unknown  Pos     Antibody Screen Unknown  Neg     Test Valid Only At Latest Units:      [facility]     Specimen Expires Unknown  07/10/2021         Again, thank you for allowing me to participate in the care of your patient.        Sincerely,        Rafita Rogel MD

## 2021-07-29 NOTE — NURSING NOTE
"Oncology Rooming Note    July 29, 2021 5:05 PM   Bonny Raymundo is a 75 year old female who presents for:    Chief Complaint   Patient presents with     Blood Draw     Labs drawn via VPT by RN in lab. VS taken.     Oncology Clinic Visit     aplastic anemia      Initial Vitals: BP (!) 156/75   Pulse 91   Temp 97.9  F (36.6  C) (Oral)   Resp 16   Wt 54 kg (119 lb)   SpO2 97%   BMI 21.08 kg/m   Estimated body mass index is 21.08 kg/m  as calculated from the following:    Height as of 3/24/21: 1.6 m (5' 3\").    Weight as of this encounter: 54 kg (119 lb). Body surface area is 1.55 meters squared.  No Pain (0) Comment: Data Unavailable   No LMP recorded. Patient has had a hysterectomy.  Allergies reviewed: Yes  Medications reviewed: Yes    Medications: Medication refills not needed today.  Pharmacy name entered into [employer]:    [facility] [place], [place] - [address], [address]  [facility] [address] [facility] [address]  [facility], [place] - [address]  WRITTEN PRESCRIPTION REQUESTED  [id number]  [place] - [address]  [id number] [place] [facility] [address]    Clinical concerns: none       Pattie Borrego CMA              "

## 2021-07-30 RX ORDER — ALLOPURINOL 300 MG/1
150 TABLET ORAL DAILY
Qty: 40 TABLET | Refills: 1 | Status: SHIPPED | OUTPATIENT
Start: 2021-07-30 | End: 2021-08-26

## 2021-08-05 ENCOUNTER — PATIENT OUTREACH (OUTPATIENT)
Dept: ONCOLOGY | Facility: CLINIC | Age: 76
End: 2021-08-05

## 2021-08-05 DIAGNOSIS — D61.3 IDIOPATHIC APLASTIC ANEMIA (H): ICD-10-CM

## 2021-08-05 DIAGNOSIS — N18.4 CKD (CHRONIC KIDNEY DISEASE) STAGE 4, GFR 15-29 ML/MIN (H): Primary | ICD-10-CM

## 2021-08-05 RX ORDER — ALBUTEROL SULFATE 0.83 MG/ML
2.5 SOLUTION RESPIRATORY (INHALATION)
Status: CANCELLED | OUTPATIENT
Start: 2021-08-05

## 2021-08-05 RX ORDER — NALOXONE HYDROCHLORIDE 0.4 MG/ML
0.2 INJECTION, SOLUTION INTRAMUSCULAR; INTRAVENOUS; SUBCUTANEOUS
Status: CANCELLED | OUTPATIENT
Start: 2021-08-05

## 2021-08-05 RX ORDER — MEPERIDINE HYDROCHLORIDE 25 MG/ML
25 INJECTION INTRAMUSCULAR; INTRAVENOUS; SUBCUTANEOUS EVERY 30 MIN PRN
Status: CANCELLED | OUTPATIENT
Start: 2021-08-05

## 2021-08-05 RX ORDER — DIPHENHYDRAMINE HYDROCHLORIDE 50 MG/ML
50 INJECTION INTRAMUSCULAR; INTRAVENOUS
Status: CANCELLED
Start: 2021-08-05

## 2021-08-05 RX ORDER — METHYLPREDNISOLONE SODIUM SUCCINATE 125 MG/2ML
125 INJECTION, POWDER, LYOPHILIZED, FOR SOLUTION INTRAMUSCULAR; INTRAVENOUS
Status: CANCELLED
Start: 2021-08-05

## 2021-08-05 RX ORDER — EPINEPHRINE 1 MG/ML
0.3 INJECTION, SOLUTION INTRAMUSCULAR; SUBCUTANEOUS EVERY 5 MIN PRN
Status: CANCELLED | OUTPATIENT
Start: 2021-08-05

## 2021-08-05 RX ORDER — ALBUTEROL SULFATE 90 UG/1
1-2 AEROSOL, METERED RESPIRATORY (INHALATION)
Status: CANCELLED
Start: 2021-08-05

## 2021-08-05 RX ORDER — HEPARIN SODIUM (PORCINE) LOCK FLUSH IV SOLN 100 UNIT/ML 100 UNIT/ML
5 SOLUTION INTRAVENOUS
Status: CANCELLED | OUTPATIENT
Start: 2021-08-05

## 2021-08-05 RX ORDER — HEPARIN SODIUM,PORCINE 10 UNIT/ML
5 VIAL (ML) INTRAVENOUS
Status: CANCELLED | OUTPATIENT
Start: 2021-08-05

## 2021-08-05 NOTE — PROGRESS NOTES
ISABELLA Ramos at [facility] stating pt is scheduled for treatment tomorrow but orders are needed from Dr. Rogel or pt will be cancelled. Also spoke with infusion charge and encouraged them to page Dr. Rogel and also send an inbasket requesting orders but to try to avoid cancelling pt if at all possible.

## 2021-08-06 ENCOUNTER — INFUSION THERAPY VISIT (OUTPATIENT)
Dept: INFUSION THERAPY | Facility: CLINIC | Age: 76
End: 2021-08-06
Attending: INTERNAL MEDICINE
Payer: COMMERCIAL

## 2021-08-06 ENCOUNTER — HOSPITAL ENCOUNTER (OUTPATIENT)
Facility: CLINIC | Age: 76
Discharge: HOME | End: 2021-08-06
Attending: INTERNAL MEDICINE | Admitting: INTERNAL MEDICINE
Payer: COMMERCIAL

## 2021-08-06 VITALS
SYSTOLIC BLOOD PRESSURE: 123 MMHG | RESPIRATION RATE: 16 BRPM | DIASTOLIC BLOOD PRESSURE: 60 MMHG | TEMPERATURE: 97.9 F | HEART RATE: 84 BPM

## 2021-08-06 DIAGNOSIS — N18.4 CKD (CHRONIC KIDNEY DISEASE) STAGE 4, GFR 15-29 ML/MIN (H): Primary | ICD-10-CM

## 2021-08-06 DIAGNOSIS — D61.3 IDIOPATHIC APLASTIC ANEMIA (H): ICD-10-CM

## 2021-08-06 DIAGNOSIS — D50.8 OTHER IRON DEFICIENCY ANEMIA: ICD-10-CM

## 2021-08-06 LAB
ALBUMIN SERPL-MCNC: 2.4 G/DL (ref 3.4–5)
ALP SERPL-CCNC: 78 U/L (ref 40–150)
ALT SERPL W P-5'-P-CCNC: 18 U/L (ref 0–50)
ANION GAP SERPL CALCULATED.3IONS-SCNC: 3 MMOL/L (ref 3–14)
AST SERPL W P-5'-P-CCNC: 23 U/L (ref 0–45)
BASOPHILS # BLD MANUAL: 0 10E3/UL (ref 0–0.2)
BASOPHILS NFR BLD MANUAL: 0 %
BILIRUB SERPL-MCNC: 0.6 MG/DL (ref 0.2–1.3)
BUN SERPL-MCNC: 26 MG/DL (ref 7–30)
CALCIUM SERPL-MCNC: 8.1 MG/DL (ref 8.5–10.1)
CHLORIDE BLD-SCNC: 111 MMOL/L (ref 94–109)
CO2 SERPL-SCNC: 26 MMOL/L (ref 20–32)
CREAT SERPL-MCNC: 1.7 MG/DL (ref 0.52–1.04)
EOSINOPHIL # BLD MANUAL: 0.1 10E3/UL (ref 0–0.7)
EOSINOPHIL NFR BLD MANUAL: 3 %
ERYTHROCYTE [DISTWIDTH] IN BLOOD BY AUTOMATED COUNT: 25.6 % (ref 10–15)
FERRITIN SERPL-MCNC: 16 NG/ML (ref 8–252)
GFR SERPL CREATININE-BSD FRML MDRD: 29 ML/MIN/1.73M2
GLUCOSE BLD-MCNC: 121 MG/DL (ref 70–99)
HCT VFR BLD AUTO: 28.5 % (ref 35–47)
HGB BLD-MCNC: 8.8 G/DL (ref 11.7–15.7)
LYMPHOCYTES # BLD MANUAL: 1.1 10E3/UL (ref 0.8–5.3)
LYMPHOCYTES NFR BLD MANUAL: 44 %
MCH RBC QN AUTO: 29.2 PG (ref 26.5–33)
MCHC RBC AUTO-ENTMCNC: 30.9 G/DL (ref 31.5–36.5)
MCV RBC AUTO: 95 FL (ref 78–100)
MONOCYTES # BLD MANUAL: 0.1 10E3/UL (ref 0–1.3)
MONOCYTES NFR BLD MANUAL: 4 %
NEUTROPHILS # BLD MANUAL: 1.3 10E3/UL (ref 1.6–8.3)
NEUTROPHILS NFR BLD MANUAL: 49 %
PLAT MORPH BLD: ABNORMAL
PLATELET # BLD AUTO: 86 10E3/UL (ref 150–450)
POTASSIUM BLD-SCNC: 3.9 MMOL/L (ref 3.4–5.3)
PROT SERPL-MCNC: 7.1 G/DL (ref 6.8–8.8)
RBC # BLD AUTO: 3.01 10E6/UL (ref 3.8–5.2)
RBC MORPH BLD: ABNORMAL
RETICS # AUTO: 0.05 10E6/UL (ref 0.03–0.1)
RETICS/RBC NFR AUTO: 1.5 % (ref 0.5–2)
SODIUM SERPL-SCNC: 140 MMOL/L (ref 133–144)
URATE SERPL-MCNC: 4.5 MG/DL (ref 2.6–6)
WBC # BLD AUTO: 2.6 10E3/UL (ref 4–11)

## 2021-08-06 PROCEDURE — 80053 COMPREHEN METABOLIC PANEL: CPT | Performed by: INTERNAL MEDICINE

## 2021-08-06 PROCEDURE — 85014 HEMATOCRIT: CPT | Performed by: INTERNAL MEDICINE

## 2021-08-06 PROCEDURE — 258N000003 HC RX IP 258 OP 636: Performed by: INTERNAL MEDICINE

## 2021-08-06 PROCEDURE — 36415 COLL VENOUS BLD VENIPUNCTURE: CPT

## 2021-08-06 PROCEDURE — 84550 ASSAY OF BLOOD/URIC ACID: CPT | Performed by: INTERNAL MEDICINE

## 2021-08-06 PROCEDURE — 85045 AUTOMATED RETICULOCYTE COUNT: CPT | Performed by: INTERNAL MEDICINE

## 2021-08-06 PROCEDURE — 96365 THER/PROPH/DIAG IV INF INIT: CPT

## 2021-08-06 PROCEDURE — 250N000011 HC RX IP 250 OP 636: Performed by: INTERNAL MEDICINE

## 2021-08-06 PROCEDURE — 82668 ASSAY OF ERYTHROPOIETIN: CPT | Performed by: INTERNAL MEDICINE

## 2021-08-06 PROCEDURE — 82728 ASSAY OF FERRITIN: CPT | Performed by: INTERNAL MEDICINE

## 2021-08-06 PROCEDURE — 96366 THER/PROPH/DIAG IV INF ADDON: CPT

## 2021-08-06 RX ORDER — HEPARIN SODIUM,PORCINE 10 UNIT/ML
5 VIAL (ML) INTRAVENOUS
Status: CANCELLED | OUTPATIENT
Start: 2021-08-08

## 2021-08-06 RX ORDER — ALBUTEROL SULFATE 90 UG/1
1-2 AEROSOL, METERED RESPIRATORY (INHALATION)
Status: CANCELLED
Start: 2021-08-08

## 2021-08-06 RX ORDER — EPINEPHRINE 1 MG/ML
0.3 INJECTION, SOLUTION INTRAMUSCULAR; SUBCUTANEOUS EVERY 5 MIN PRN
Status: CANCELLED | OUTPATIENT
Start: 2021-08-08

## 2021-08-06 RX ORDER — MEPERIDINE HYDROCHLORIDE 25 MG/ML
25 INJECTION INTRAMUSCULAR; INTRAVENOUS; SUBCUTANEOUS EVERY 30 MIN PRN
Status: CANCELLED | OUTPATIENT
Start: 2021-08-08

## 2021-08-06 RX ORDER — ALBUTEROL SULFATE 0.83 MG/ML
2.5 SOLUTION RESPIRATORY (INHALATION)
Status: CANCELLED | OUTPATIENT
Start: 2021-08-08

## 2021-08-06 RX ORDER — DIPHENHYDRAMINE HYDROCHLORIDE 50 MG/ML
50 INJECTION INTRAMUSCULAR; INTRAVENOUS
Status: CANCELLED
Start: 2021-08-08

## 2021-08-06 RX ORDER — HEPARIN SODIUM (PORCINE) LOCK FLUSH IV SOLN 100 UNIT/ML 100 UNIT/ML
5 SOLUTION INTRAVENOUS
Status: CANCELLED | OUTPATIENT
Start: 2021-08-08

## 2021-08-06 RX ORDER — NALOXONE HYDROCHLORIDE 0.4 MG/ML
0.2 INJECTION, SOLUTION INTRAMUSCULAR; INTRAVENOUS; SUBCUTANEOUS
Status: CANCELLED | OUTPATIENT
Start: 2021-08-08

## 2021-08-06 RX ORDER — METHYLPREDNISOLONE SODIUM SUCCINATE 125 MG/2ML
125 INJECTION, POWDER, LYOPHILIZED, FOR SOLUTION INTRAMUSCULAR; INTRAVENOUS
Status: CANCELLED
Start: 2021-08-08

## 2021-08-06 RX ADMIN — SODIUM CHLORIDE 250 ML: 9 INJECTION, SOLUTION INTRAVENOUS at 13:12

## 2021-08-06 RX ADMIN — IRON SUCROSE 300 MG: 20 INJECTION, SOLUTION INTRAVENOUS at 13:13

## 2021-08-06 NOTE — PROGRESS NOTES
Infusion Nursing Note:  Bonny PAK Zackary presents today for venofer and labs per patient request.    Patient seen by provider today: No   present during visit today: Not Applicable.    Note: N/A.      Intravenous Access:  Labs drawn without difficulty.  Peripheral IV placed.    Treatment Conditions:  Not Applicable.      Post Infusion Assessment:  Patient tolerated infusion without incident.  Blood return noted pre and post infusion.  Site patent and intact, free from redness, edema or discomfort.  No evidence of extravasations.  Access discontinued per protocol.       Discharge Plan:   Discharge instructions reviewed with: Patient.  Patient and/or family verbalized understanding of discharge instructions and all questions answered.  Patient discharged in stable condition accompanied by: self.  Departure Mode: Ambulatory.      Kathy Corea RN

## 2021-08-07 LAB — EPO SERPL-ACNC: 151 MU/ML

## 2021-08-09 ENCOUNTER — TELEPHONE (OUTPATIENT)
Dept: TRANSPLANT | Facility: CLINIC | Age: 76
End: 2021-08-09

## 2021-08-09 NOTE — TELEPHONE ENCOUNTER
Patient added on tomorrow for another dose of Venofer 300mg, although no orders have been entered.  RN page Dr. Rogel to clarify.  Per Dr. Rogel, no need for a 3rd dose of Venofer, pt has already had 2 doses.  Scheduling notified to call patient and cancel appointment.

## 2021-08-22 DIAGNOSIS — D61.3 IDIOPATHIC APLASTIC ANEMIA (H): ICD-10-CM

## 2021-08-26 RX ORDER — ALLOPURINOL 300 MG/1
150 TABLET ORAL DAILY
Qty: 15 TABLET | Refills: 5 | Status: SHIPPED | OUTPATIENT
Start: 2021-08-26 | End: 2021-12-07

## 2021-09-02 ENCOUNTER — APPOINTMENT (OUTPATIENT)
Dept: LAB | Facility: CLINIC | Age: 76
End: 2021-09-02
Attending: INTERNAL MEDICINE
Payer: COMMERCIAL

## 2021-09-02 ENCOUNTER — OFFICE VISIT (OUTPATIENT)
Dept: TRANSPLANT | Facility: CLINIC | Age: 76
End: 2021-09-02
Attending: INTERNAL MEDICINE
Payer: COMMERCIAL

## 2021-09-02 VITALS
SYSTOLIC BLOOD PRESSURE: 134 MMHG | HEART RATE: 82 BPM | RESPIRATION RATE: 16 BRPM | DIASTOLIC BLOOD PRESSURE: 82 MMHG | WEIGHT: 121.7 LBS | BODY MASS INDEX: 21.56 KG/M2 | TEMPERATURE: 97.9 F | OXYGEN SATURATION: 98 %

## 2021-09-02 DIAGNOSIS — D50.8 OTHER IRON DEFICIENCY ANEMIA: ICD-10-CM

## 2021-09-02 DIAGNOSIS — D61.3 IDIOPATHIC APLASTIC ANEMIA (H): Primary | ICD-10-CM

## 2021-09-02 LAB
ACANTHOCYTES BLD QL SMEAR: SLIGHT
ALBUMIN SERPL-MCNC: 2.6 G/DL (ref 3.4–5)
ALP SERPL-CCNC: 79 U/L (ref 40–150)
ALT SERPL W P-5'-P-CCNC: 15 U/L (ref 0–50)
ANION GAP SERPL CALCULATED.3IONS-SCNC: 4 MMOL/L (ref 3–14)
AST SERPL W P-5'-P-CCNC: 15 U/L (ref 0–45)
BASOPHILS # BLD MANUAL: 0 10E3/UL (ref 0–0.2)
BASOPHILS NFR BLD MANUAL: 0 %
BILIRUB SERPL-MCNC: 0.5 MG/DL (ref 0.2–1.3)
BUN SERPL-MCNC: 24 MG/DL (ref 7–30)
CALCIUM SERPL-MCNC: 9 MG/DL (ref 8.5–10.1)
CHLORIDE BLD-SCNC: 110 MMOL/L (ref 94–109)
CO2 SERPL-SCNC: 27 MMOL/L (ref 20–32)
CREAT SERPL-MCNC: 1.28 MG/DL (ref 0.52–1.04)
EOSINOPHIL # BLD MANUAL: 0.2 10E3/UL (ref 0–0.7)
EOSINOPHIL NFR BLD MANUAL: 8 %
ERYTHROCYTE [DISTWIDTH] IN BLOOD BY AUTOMATED COUNT: 25.1 % (ref 10–15)
FRAGMENTS BLD QL SMEAR: SLIGHT
GFR SERPL CREATININE-BSD FRML MDRD: 40 ML/MIN/1.73M2
GLUCOSE BLD-MCNC: 104 MG/DL (ref 70–99)
HCT VFR BLD AUTO: 27.8 % (ref 35–47)
HGB BLD-MCNC: 8.9 G/DL (ref 11.7–15.7)
HOLD SPECIMEN: NORMAL
LYMPHOCYTES # BLD MANUAL: 1.1 10E3/UL (ref 0.8–5.3)
LYMPHOCYTES NFR BLD MANUAL: 52 %
MCH RBC QN AUTO: 31.8 PG (ref 26.5–33)
MCHC RBC AUTO-ENTMCNC: 32 G/DL (ref 31.5–36.5)
MCV RBC AUTO: 99 FL (ref 78–100)
MONOCYTES # BLD MANUAL: 0.3 10E3/UL (ref 0–1.3)
MONOCYTES NFR BLD MANUAL: 12 %
NEUTROPHILS # BLD MANUAL: 0.6 10E3/UL (ref 1.6–8.3)
NEUTROPHILS NFR BLD MANUAL: 28 %
NRBC # BLD AUTO: 0 10E3/UL
NRBC BLD MANUAL-RTO: 2 %
PLAT MORPH BLD: ABNORMAL
PLATELET # BLD AUTO: 74 10E3/UL (ref 150–450)
POTASSIUM BLD-SCNC: 4.1 MMOL/L (ref 3.4–5.3)
PROT SERPL-MCNC: 7.2 G/DL (ref 6.8–8.8)
RBC # BLD AUTO: 2.8 10E6/UL (ref 3.8–5.2)
RBC MORPH BLD: ABNORMAL
RETICS # AUTO: 0.06 10E6/UL (ref 0.03–0.1)
RETICS/RBC NFR AUTO: 2 % (ref 0.5–2)
SODIUM SERPL-SCNC: 141 MMOL/L (ref 133–144)
TARGETS BLD QL SMEAR: SLIGHT
WBC # BLD AUTO: 2.1 10E3/UL (ref 4–11)

## 2021-09-02 PROCEDURE — 85027 COMPLETE CBC AUTOMATED: CPT | Performed by: INTERNAL MEDICINE

## 2021-09-02 PROCEDURE — 85045 AUTOMATED RETICULOCYTE COUNT: CPT | Performed by: INTERNAL MEDICINE

## 2021-09-02 PROCEDURE — 80053 COMPREHEN METABOLIC PANEL: CPT | Performed by: INTERNAL MEDICINE

## 2021-09-02 PROCEDURE — G0463 HOSPITAL OUTPT CLINIC VISIT: HCPCS

## 2021-09-02 PROCEDURE — 99214 OFFICE O/P EST MOD 30 MIN: CPT | Performed by: INTERNAL MEDICINE

## 2021-09-02 PROCEDURE — 36415 COLL VENOUS BLD VENIPUNCTURE: CPT | Performed by: INTERNAL MEDICINE

## 2021-09-02 ASSESSMENT — PAIN SCALES - GENERAL: PAINLEVEL: NO PAIN (0)

## 2021-09-02 NOTE — LETTER
9/2/2021         RE: Bonny Raymundo  [address]  [facility] [address]      /82 (BP Location: Right arm, Patient Position: Sitting, Cuff Size: Adult Regular)   Pulse 82   Temp 97.9  F (36.6  C) (Oral)   Resp 16   Wt 55.2 kg (121 lb 11.2 oz)   SpO2 98%   BMI 21.56 kg/m    Wt Readings from Last 4 Encounters:   09/02/21 55.2 kg (121 lb 11.2 oz)   07/29/21 54 kg (119 lb)   05/27/21 56.7 kg (124 lb 14.4 oz)   03/24/21 57.2 kg (126 lb)     Bonny returns to follow-up her aplastic anemia.  We stopped cyclosporine in May due to continued renal insufficiency even with progressively lower dose and stopped eltrombopag in June due to abnormal liver function.    Allopurinol was added at the end of July due to mildly elevated uric acid and that corrected promptly.    Over the last month she is felt a bit better.  She has had 2 iron infusions for her possible iron depletion reflectd in her falling MCHC which she tolerated without trouble.  She has had no fever chills or rash.  She had a little bit of gum bleeding last week but that is unusual for her.  She has mild bruises otherwise some from gardening injuries but no external other bleeding.  She has no respiratory cardiac GI or  new symptoms and no paresthesias.    Her exam shows her weight roughly steady in the last 3-4 months and her vital signs were acceptable.  She has no peripheral lymphadenopathy.  She pointed out to me that the area under her right maxilla is a little less puffy than on the left as though her cheeks have lost some fullness.  There is no redness tenderness in the area or on the skin and there is no tenderness in the maxillary bone itself.  Her lungs are clear without rales or wheezing.  Her heart tones are regular with a soft ejection murmur.  Her abdomen is soft without palpable masses or hepatosplenomegaly.  She has no lower extremity edema.      Laboratory evaluation showed a mildly lower platelet count low neutrophils at 600  but is slightly more elevated hemoglobin at 8.9.  Her reticulocytes are stable at 55,000, no increase to compensate for her anemia but they are not falling.    Her chemistries show improvement in her creatinine in the several months off cyclosporine and her liver functions normalized soon after stopping eltrombopag several months ago.    Her erythropoietin is inadequately elevated for her level of anemia and with her renal insufficiency I had considered erythropoietin supplementation but at the moment her hemoglobin is not worse and in fact slowly rising so we will defer.    We will recheck her counts in a few weeks because we have to decide whether her falling neutrophil and platelet count justifies an additional intervention but due to her notable organ toxicities with both cyclosporine and eltrombopag in the past we will delay rather than initiate either of those now.    She will call if bleeding or infection develops but I will check her counts and 3 to 4 weeks and I will see her again in 2 months time or sooner if the need arises    Rafita Rogel MD    Professor medicine    Results for LISSETH PARIKH (MRN [id number]) as of 9/2/2021 20:41   Ref. Range 8/6/2021 13:12 9/2/2021 16:34   Sodium Latest Ref Range: 133 - 144 mmol/L 140 141   Potassium Latest Ref Range: 3.4 - 5.3 mmol/L 3.9 4.1   Chloride Latest Ref Range: 94 - 109 mmol/L 111 (H) 110 (H)   Carbon Dioxide Latest Ref Range: 20 - 32 mmol/L 26 27   Urea Nitrogen Latest Ref Range: 7 - 30 mg/dL 26 24   Creatinine Latest Ref Range: 0.52 - 1.04 mg/dL 1.70 (H) 1.28 (H)   GFR Estimate Latest Ref Range: >60 mL/min/1.73m2 29 (L) 40 (L)   Calcium Latest Ref Range: 8.5 - 10.1 mg/dL 8.1 (L) 9.0   Anion Gap Latest Ref Range: 3 - 14 mmol/L 3 4   Albumin Latest Ref Range: 3.4 - 5.0 g/dL 2.4 (L) 2.6 (L)   Protein Total Latest Ref Range: 6.8 - 8.8 g/dL 7.1 7.2   Bilirubin Total Latest Ref Range: 0.2 - 1.3 mg/dL 0.6 0.5   Alkaline Phosphatase Latest Ref Range: 40  - 150 U/L 78 79   ALT Latest Ref Range: 0 - 50 U/L 18 15   AST Latest Ref Range: 0 - 45 U/L 23 15   Erythropoietin Latest Ref Range: 4 - 27 mU/mL 151 (H)    Ferritin Latest Ref Range: 8 - 252 ng/mL 16    Uric Acid Latest Ref Range: 2.6 - 6.0 mg/dL 4.5    Glucose Latest Ref Range: 70 - 99 mg/dL 121 (H) 104 (H)   WBC Latest Ref Range: 4.0 - 11.0 10e3/uL 2.6 (L) 2.1 (L)   Hemoglobin Latest Ref Range: 11.7 - 15.7 g/dL 8.8 (L) 8.9 (L)   Hematocrit Latest Ref Range: 35.0 - 47.0 % 28.5 (L) 27.8 (L)   Platelet Count Latest Ref Range: 150 - 450 10e3/uL 86 (L) 74 (L)   RBC Count Latest Ref Range: 3.80 - 5.20 10e6/uL 3.01 (L) 2.80 (L)   MCV Latest Ref Range: 78 - 100 fL 95 99   MCH Latest Ref Range: 26.5 - 33.0 pg 29.2 31.8   MCHC Latest Ref Range: 31.5 - 36.5 g/dL 30.9 (L) 32.0   RDW Latest Ref Range: 10.0 - 15.0 % 25.6 (H) 25.1 (H)   % Neutrophils Latest Units: % 49 28   % Lymphocytes Latest Units: % 44 52   % Monocytes Latest Units: % 4 12   % Eosinophils Latest Units: % 3 8   Absolute Basophils Latest Ref Range: 0.0 - 0.2 10e3/uL 0.0 0.0   % Basophils Latest Units: % 0 0   NRBC/W Latest Ref Range: <=0 %  2 (H)   Absolute Neutrophil Latest Ref Range: 1.6 - 8.3 10e3/uL 1.3 (L) 0.6 (L)   Absolute Lymphocytes Latest Ref Range: 0.8 - 5.3 10e3/uL 1.1 1.1   Absolute Monocytes Latest Ref Range: 0.0 - 1.3 10e3/uL 0.1 0.3   Absolute Eosinophils Latest Ref Range: 0.0 - 0.7 10e3/uL 0.1 0.2   Absolute NRBCs Latest Ref Range: <=0.0 10e3/uL  0.0   RBC Morphology Unknown Confirmed RBC Indices Confirmed RBC Indices   Platelet Morphology Latest Ref Range: Automated Count Confirmed. Platelet morphology is normal.  Automated Count Confirmed. Platelet morphology is normal. Automated Count Confirmed. Platelet morphology is normal.   RBC Fragments Latest Ref Range: None Seen   Slight (A)   Acanthocytes Latest Ref Range: None Seen   Slight (A)   Target Cells Latest Ref Range: None Seen   Slight (A)   % Retic Latest Ref Range: 0.5 - 2.0 % 1.5 2.0    Absolute Retic Latest Ref Range: 0.025 - 0.095 10e6/uL 0.046 0.055       Results for LISSETH PARIKH (MRN [id number]) as of 9/2/2021 17:56   Ref. Range 8/6/2021 13:12 9/2/2021 16:34   Sodium Latest Ref Range: 133 - 144 mmol/L 140 141   Potassium Latest Ref Range: 3.4 - 5.3 mmol/L 3.9 4.1   Chloride Latest Ref Range: 94 - 109 mmol/L 111 (H) 110 (H)   Carbon Dioxide Latest Ref Range: 20 - 32 mmol/L 26 27   Urea Nitrogen Latest Ref Range: 7 - 30 mg/dL 26 24   Creatinine Latest Ref Range: 0.52 - 1.04 mg/dL 1.70 (H) 1.28 (H)   GFR Estimate Latest Ref Range: >60 mL/min/1.73m2 29 (L) 40 (L)   Calcium Latest Ref Range: 8.5 - 10.1 mg/dL 8.1 (L) 9.0   Anion Gap Latest Ref Range: 3 - 14 mmol/L 3 4   Albumin Latest Ref Range: 3.4 - 5.0 g/dL 2.4 (L) 2.6 (L)   Protein Total Latest Ref Range: 6.8 - 8.8 g/dL 7.1 7.2   Bilirubin Total Latest Ref Range: 0.2 - 1.3 mg/dL 0.6 0.5   Alkaline Phosphatase Latest Ref Range: 40 - 150 U/L 78 79   ALT Latest Ref Range: 0 - 50 U/L 18 15   AST Latest Ref Range: 0 - 45 U/L 23 15   Erythropoietin Latest Ref Range: 4 - 27 mU/mL 151 (H)    Ferritin Latest Ref Range: 8 - 252 ng/mL 16    Uric Acid Latest Ref Range: 2.6 - 6.0 mg/dL 4.5    Glucose Latest Ref Range: 70 - 99 mg/dL 121 (H) 104 (H)   WBC Latest Ref Range: 4.0 - 11.0 10e3/uL 2.6 (L) 2.1 (L)   Hemoglobin Latest Ref Range: 11.7 - 15.7 g/dL 8.8 (L) 8.9 (L)   Hematocrit Latest Ref Range: 35.0 - 47.0 % 28.5 (L) 27.8 (L)   Platelet Count Latest Ref Range: 150 - 450 10e3/uL 86 (L) 74 (L)   RBC Count Latest Ref Range: 3.80 - 5.20 10e6/uL 3.01 (L) 2.80 (L)   MCV Latest Ref Range: 78 - 100 fL 95 99   MCH Latest Ref Range: 26.5 - 33.0 pg 29.2 31.8   MCHC Latest Ref Range: 31.5 - 36.5 g/dL 30.9 (L) 32.0   RDW Latest Ref Range: 10.0 - 15.0 % 25.6 (H) 25.1 (H)   % Neutrophils Latest Units: % 49 28   % Lymphocytes Latest Units: % 44 52   % Monocytes Latest Units: % 4 12   % Eosinophils Latest Units: % 3 8   Absolute Basophils Latest Ref Range:  0.0 - 0.2 10e3/uL 0.0 0.0   % Basophils Latest Units: % 0 0   NRBC/W Latest Ref Range: <=0 %  2 (H)   Absolute Neutrophil Latest Ref Range: 1.6 - 8.3 10e3/uL 1.3 (L) 0.6 (L)   Absolute Lymphocytes Latest Ref Range: 0.8 - 5.3 10e3/uL 1.1 1.1   Absolute Monocytes Latest Ref Range: 0.0 - 1.3 10e3/uL 0.1 0.3   Absolute Eosinophils Latest Ref Range: 0.0 - 0.7 10e3/uL 0.1 0.2   Absolute NRBCs Latest Ref Range: <=0.0 10e3/uL  0.0   RBC Morphology Unknown Confirmed RBC Indices Confirmed RBC Indices   Platelet Morphology Latest Ref Range: Automated Count Confirmed. Platelet morphology is normal.  Automated Count Confirmed. Platelet morphology is normal. Automated Count Confirmed. Platelet morphology is normal.   RBC Fragments Latest Ref Range: None Seen   Slight (A)   Acanthocytes Latest Ref Range: None Seen   Slight (A)   Target Cells Latest Ref Range: None Seen   Slight (A)   % Retic Latest Ref Range: 0.5 - 2.0 % 1.5 2.0   Absolute Retic Latest Ref Range: 0.025 - 0.095 10e6/uL 0.046 0.055         Rafita Rogel MD

## 2021-09-02 NOTE — LETTER
9/2/2021         RE: Bonny Raymundo  [address]  [facility] [address]        Dear Colleague,    Thank you for referring your patient, Bonny Raymundo, to the [facility] [place]. Please see a copy of my visit note below.    /82 (BP Location: Right arm, Patient Position: Sitting, Cuff Size: Adult Regular)   Pulse 82   Temp 97.9  F (36.6  C) (Oral)   Resp 16   Wt 55.2 kg (121 lb 11.2 oz)   SpO2 98%   BMI 21.56 kg/m    Wt Readings from Last 4 Encounters:   09/02/21 55.2 kg (121 lb 11.2 oz)   07/29/21 54 kg (119 lb)   05/27/21 56.7 kg (124 lb 14.4 oz)   03/24/21 57.2 kg (126 lb)     Bonny returns to follow-up her aplastic anemia.  We stopped cyclosporine in May due to continued renal insufficiency even with progressively lower dose and stopped eltrombopag in June due to abnormal liver function.    Allopurinol was added at the end of July due to mildly elevated uric acid and that corrected promptly.    Over the last month she is felt a bit better.  She has had 2 iron infusions for her possible iron depletion reflectd in her falling MCHC which she tolerated without trouble.  She has had no fever chills or rash.  She had a little bit of gum bleeding last week but that is unusual for her.  She has mild bruises otherwise some from gardening injuries but no external other bleeding.  She has no respiratory cardiac GI or  new symptoms and no paresthesias.    Her exam shows her weight roughly steady in the last 3-4 months and her vital signs were acceptable.  She has no peripheral lymphadenopathy.  She pointed out to me that the area under her right maxilla is a little less puffy than on the left as though her cheeks have lost some fullness.  There is no redness tenderness in the area or on the skin and there is no tenderness in the maxillary bone itself.  Her lungs are clear without rales or wheezing.  Her heart tones are regular with a soft ejection  murmur.  Her abdomen is soft without palpable masses or hepatosplenomegaly.  She has no lower extremity edema.      Laboratory evaluation showed a mildly lower platelet count low neutrophils at 600 but is slightly more elevated hemoglobin at 8.9.  Her reticulocytes are stable at 55,000, no increase to compensate for her anemia but they are not falling.    Her chemistries show improvement in her creatinine in the several months off cyclosporine and her liver functions normalized soon after stopping eltrombopag several months ago.    Her erythropoietin is inadequately elevated for her level of anemia and with her renal insufficiency I had considered erythropoietin supplementation but at the moment her hemoglobin is not worse and in fact slowly rising so we will defer.    We will recheck her counts in a few weeks because we have to decide whether her falling neutrophil and platelet count justifies an additional intervention but due to her notable organ toxicities with both cyclosporine and eltrombopag in the past we will delay rather than initiate either of those now.    She will call if bleeding or infection develops but I will check her counts and 3 to 4 weeks and I will see her again in 2 months time or sooner if the need arises    Rafita Rogel MD    Professor medicine    Results for LISSETH PARIKH (MRN [id number]) as of 9/2/2021 20:41   Ref. Range 8/6/2021 13:12 9/2/2021 16:34   Sodium Latest Ref Range: 133 - 144 mmol/L 140 141   Potassium Latest Ref Range: 3.4 - 5.3 mmol/L 3.9 4.1   Chloride Latest Ref Range: 94 - 109 mmol/L 111 (H) 110 (H)   Carbon Dioxide Latest Ref Range: 20 - 32 mmol/L 26 27   Urea Nitrogen Latest Ref Range: 7 - 30 mg/dL 26 24   Creatinine Latest Ref Range: 0.52 - 1.04 mg/dL 1.70 (H) 1.28 (H)   GFR Estimate Latest Ref Range: >60 mL/min/1.73m2 29 (L) 40 (L)   Calcium Latest Ref Range: 8.5 - 10.1 mg/dL 8.1 (L) 9.0   Anion Gap Latest Ref Range: 3 - 14 mmol/L 3 4   Albumin Latest Ref  Range: 3.4 - 5.0 g/dL 2.4 (L) 2.6 (L)   Protein Total Latest Ref Range: 6.8 - 8.8 g/dL 7.1 7.2   Bilirubin Total Latest Ref Range: 0.2 - 1.3 mg/dL 0.6 0.5   Alkaline Phosphatase Latest Ref Range: 40 - 150 U/L 78 79   ALT Latest Ref Range: 0 - 50 U/L 18 15   AST Latest Ref Range: 0 - 45 U/L 23 15   Erythropoietin Latest Ref Range: 4 - 27 mU/mL 151 (H)    Ferritin Latest Ref Range: 8 - 252 ng/mL 16    Uric Acid Latest Ref Range: 2.6 - 6.0 mg/dL 4.5    Glucose Latest Ref Range: 70 - 99 mg/dL 121 (H) 104 (H)   WBC Latest Ref Range: 4.0 - 11.0 10e3/uL 2.6 (L) 2.1 (L)   Hemoglobin Latest Ref Range: 11.7 - 15.7 g/dL 8.8 (L) 8.9 (L)   Hematocrit Latest Ref Range: 35.0 - 47.0 % 28.5 (L) 27.8 (L)   Platelet Count Latest Ref Range: 150 - 450 10e3/uL 86 (L) 74 (L)   RBC Count Latest Ref Range: 3.80 - 5.20 10e6/uL 3.01 (L) 2.80 (L)   MCV Latest Ref Range: 78 - 100 fL 95 99   MCH Latest Ref Range: 26.5 - 33.0 pg 29.2 31.8   MCHC Latest Ref Range: 31.5 - 36.5 g/dL 30.9 (L) 32.0   RDW Latest Ref Range: 10.0 - 15.0 % 25.6 (H) 25.1 (H)   % Neutrophils Latest Units: % 49 28   % Lymphocytes Latest Units: % 44 52   % Monocytes Latest Units: % 4 12   % Eosinophils Latest Units: % 3 8   Absolute Basophils Latest Ref Range: 0.0 - 0.2 10e3/uL 0.0 0.0   % Basophils Latest Units: % 0 0   NRBC/W Latest Ref Range: <=0 %  2 (H)   Absolute Neutrophil Latest Ref Range: 1.6 - 8.3 10e3/uL 1.3 (L) 0.6 (L)   Absolute Lymphocytes Latest Ref Range: 0.8 - 5.3 10e3/uL 1.1 1.1   Absolute Monocytes Latest Ref Range: 0.0 - 1.3 10e3/uL 0.1 0.3   Absolute Eosinophils Latest Ref Range: 0.0 - 0.7 10e3/uL 0.1 0.2   Absolute NRBCs Latest Ref Range: <=0.0 10e3/uL  0.0   RBC Morphology Unknown Confirmed RBC Indices Confirmed RBC Indices   Platelet Morphology Latest Ref Range: Automated Count Confirmed. Platelet morphology is normal.  Automated Count Confirmed. Platelet morphology is normal. Automated Count Confirmed. Platelet morphology is normal.   RBC Fragments  Latest Ref Range: None Seen   Slight (A)   Acanthocytes Latest Ref Range: None Seen   Slight (A)   Target Cells Latest Ref Range: None Seen   Slight (A)   % Retic Latest Ref Range: 0.5 - 2.0 % 1.5 2.0   Absolute Retic Latest Ref Range: 0.025 - 0.095 10e6/uL 0.046 0.055       Results for LISSETH PARIKH (MRN [id number]) as of 9/2/2021 17:56   Ref. Range 8/6/2021 13:12 9/2/2021 16:34   Sodium Latest Ref Range: 133 - 144 mmol/L 140 141   Potassium Latest Ref Range: 3.4 - 5.3 mmol/L 3.9 4.1   Chloride Latest Ref Range: 94 - 109 mmol/L 111 (H) 110 (H)   Carbon Dioxide Latest Ref Range: 20 - 32 mmol/L 26 27   Urea Nitrogen Latest Ref Range: 7 - 30 mg/dL 26 24   Creatinine Latest Ref Range: 0.52 - 1.04 mg/dL 1.70 (H) 1.28 (H)   GFR Estimate Latest Ref Range: >60 mL/min/1.73m2 29 (L) 40 (L)   Calcium Latest Ref Range: 8.5 - 10.1 mg/dL 8.1 (L) 9.0   Anion Gap Latest Ref Range: 3 - 14 mmol/L 3 4   Albumin Latest Ref Range: 3.4 - 5.0 g/dL 2.4 (L) 2.6 (L)   Protein Total Latest Ref Range: 6.8 - 8.8 g/dL 7.1 7.2   Bilirubin Total Latest Ref Range: 0.2 - 1.3 mg/dL 0.6 0.5   Alkaline Phosphatase Latest Ref Range: 40 - 150 U/L 78 79   ALT Latest Ref Range: 0 - 50 U/L 18 15   AST Latest Ref Range: 0 - 45 U/L 23 15   Erythropoietin Latest Ref Range: 4 - 27 mU/mL 151 (H)    Ferritin Latest Ref Range: 8 - 252 ng/mL 16    Uric Acid Latest Ref Range: 2.6 - 6.0 mg/dL 4.5    Glucose Latest Ref Range: 70 - 99 mg/dL 121 (H) 104 (H)   WBC Latest Ref Range: 4.0 - 11.0 10e3/uL 2.6 (L) 2.1 (L)   Hemoglobin Latest Ref Range: 11.7 - 15.7 g/dL 8.8 (L) 8.9 (L)   Hematocrit Latest Ref Range: 35.0 - 47.0 % 28.5 (L) 27.8 (L)   Platelet Count Latest Ref Range: 150 - 450 10e3/uL 86 (L) 74 (L)   RBC Count Latest Ref Range: 3.80 - 5.20 10e6/uL 3.01 (L) 2.80 (L)   MCV Latest Ref Range: 78 - 100 fL 95 99   MCH Latest Ref Range: 26.5 - 33.0 pg 29.2 31.8   MCHC Latest Ref Range: 31.5 - 36.5 g/dL 30.9 (L) 32.0   RDW Latest Ref Range: 10.0 - 15.0 % 25.6  (H) 25.1 (H)   % Neutrophils Latest Units: % 49 28   % Lymphocytes Latest Units: % 44 52   % Monocytes Latest Units: % 4 12   % Eosinophils Latest Units: % 3 8   Absolute Basophils Latest Ref Range: 0.0 - 0.2 10e3/uL 0.0 0.0   % Basophils Latest Units: % 0 0   NRBC/W Latest Ref Range: <=0 %  2 (H)   Absolute Neutrophil Latest Ref Range: 1.6 - 8.3 10e3/uL 1.3 (L) 0.6 (L)   Absolute Lymphocytes Latest Ref Range: 0.8 - 5.3 10e3/uL 1.1 1.1   Absolute Monocytes Latest Ref Range: 0.0 - 1.3 10e3/uL 0.1 0.3   Absolute Eosinophils Latest Ref Range: 0.0 - 0.7 10e3/uL 0.1 0.2   Absolute NRBCs Latest Ref Range: <=0.0 10e3/uL  0.0   RBC Morphology Unknown Confirmed RBC Indices Confirmed RBC Indices   Platelet Morphology Latest Ref Range: Automated Count Confirmed. Platelet morphology is normal.  Automated Count Confirmed. Platelet morphology is normal. Automated Count Confirmed. Platelet morphology is normal.   RBC Fragments Latest Ref Range: None Seen   Slight (A)   Acanthocytes Latest Ref Range: None Seen   Slight (A)   Target Cells Latest Ref Range: None Seen   Slight (A)   % Retic Latest Ref Range: 0.5 - 2.0 % 1.5 2.0   Absolute Retic Latest Ref Range: 0.025 - 0.095 10e6/uL 0.046 0.055       Again, thank you for allowing me to participate in the care of your patient.        Sincerely,        Rafita Rogel MD

## 2021-09-02 NOTE — PROGRESS NOTES
/82 (BP Location: Right arm, Patient Position: Sitting, Cuff Size: Adult Regular)   Pulse 82   Temp 97.9  F (36.6  C) (Oral)   Resp 16   Wt 55.2 kg (121 lb 11.2 oz)   SpO2 98%   BMI 21.56 kg/m    Wt Readings from Last 4 Encounters:   09/02/21 55.2 kg (121 lb 11.2 oz)   07/29/21 54 kg (119 lb)   05/27/21 56.7 kg (124 lb 14.4 oz)   03/24/21 57.2 kg (126 lb)     Bonny returns to follow-up her aplastic anemia.  We stopped cyclosporine in May due to continued renal insufficiency even with progressively lower dose and stopped eltrombopag in June due to abnormal liver function.    Allopurinol was added at the end of July due to mildly elevated uric acid and that corrected promptly.    Over the last month she is felt a bit better.  She has had 2 iron infusions for her possible iron depletion reflectd in her falling MCHC which she tolerated without trouble.  She has had no fever chills or rash.  She had a little bit of gum bleeding last week but that is unusual for her.  She has mild bruises otherwise some from gardening injuries but no external other bleeding.  She has no respiratory cardiac GI or  new symptoms and no paresthesias.    Her exam shows her weight roughly steady in the last 3-4 months and her vital signs were acceptable.  She has no peripheral lymphadenopathy.  She pointed out to me that the area under her right maxilla is a little less puffy than on the left as though her cheeks have lost some fullness.  There is no redness tenderness in the area or on the skin and there is no tenderness in the maxillary bone itself.  Her lungs are clear without rales or wheezing.  Her heart tones are regular with a soft ejection murmur.  Her abdomen is soft without palpable masses or hepatosplenomegaly.  She has no lower extremity edema.      Laboratory evaluation showed a mildly lower platelet count low neutrophils at 600 but is slightly more elevated hemoglobin at 8.9.  Her reticulocytes are stable at 55,000,  no increase to compensate for her anemia but they are not falling.    Her chemistries show improvement in her creatinine in the several months off cyclosporine and her liver functions normalized soon after stopping eltrombopag several months ago.    Her erythropoietin is inadequately elevated for her level of anemia and with her renal insufficiency I had considered erythropoietin supplementation but at the moment her hemoglobin is not worse and in fact slowly rising so we will defer.    We will recheck her counts in a few weeks because we have to decide whether her falling neutrophil and platelet count justifies an additional intervention but due to her notable organ toxicities with both cyclosporine and eltrombopag in the past we will delay rather than initiate either of those now.    She will call if bleeding or infection develops but I will check her counts and 3 to 4 weeks and I will see her again in 2 months time or sooner if the need arises    Rafita Rogel MD    Professor medicine    Results for LISSETH PARIKH (MRN [id number]) as of 9/2/2021 20:41   Ref. Range 8/6/2021 13:12 9/2/2021 16:34   Sodium Latest Ref Range: 133 - 144 mmol/L 140 141   Potassium Latest Ref Range: 3.4 - 5.3 mmol/L 3.9 4.1   Chloride Latest Ref Range: 94 - 109 mmol/L 111 (H) 110 (H)   Carbon Dioxide Latest Ref Range: 20 - 32 mmol/L 26 27   Urea Nitrogen Latest Ref Range: 7 - 30 mg/dL 26 24   Creatinine Latest Ref Range: 0.52 - 1.04 mg/dL 1.70 (H) 1.28 (H)   GFR Estimate Latest Ref Range: >60 mL/min/1.73m2 29 (L) 40 (L)   Calcium Latest Ref Range: 8.5 - 10.1 mg/dL 8.1 (L) 9.0   Anion Gap Latest Ref Range: 3 - 14 mmol/L 3 4   Albumin Latest Ref Range: 3.4 - 5.0 g/dL 2.4 (L) 2.6 (L)   Protein Total Latest Ref Range: 6.8 - 8.8 g/dL 7.1 7.2   Bilirubin Total Latest Ref Range: 0.2 - 1.3 mg/dL 0.6 0.5   Alkaline Phosphatase Latest Ref Range: 40 - 150 U/L 78 79   ALT Latest Ref Range: 0 - 50 U/L 18 15   AST Latest Ref Range: 0 - 45  U/L 23 15   Erythropoietin Latest Ref Range: 4 - 27 mU/mL 151 (H)    Ferritin Latest Ref Range: 8 - 252 ng/mL 16    Uric Acid Latest Ref Range: 2.6 - 6.0 mg/dL 4.5    Glucose Latest Ref Range: 70 - 99 mg/dL 121 (H) 104 (H)   WBC Latest Ref Range: 4.0 - 11.0 10e3/uL 2.6 (L) 2.1 (L)   Hemoglobin Latest Ref Range: 11.7 - 15.7 g/dL 8.8 (L) 8.9 (L)   Hematocrit Latest Ref Range: 35.0 - 47.0 % 28.5 (L) 27.8 (L)   Platelet Count Latest Ref Range: 150 - 450 10e3/uL 86 (L) 74 (L)   RBC Count Latest Ref Range: 3.80 - 5.20 10e6/uL 3.01 (L) 2.80 (L)   MCV Latest Ref Range: 78 - 100 fL 95 99   MCH Latest Ref Range: 26.5 - 33.0 pg 29.2 31.8   MCHC Latest Ref Range: 31.5 - 36.5 g/dL 30.9 (L) 32.0   RDW Latest Ref Range: 10.0 - 15.0 % 25.6 (H) 25.1 (H)   % Neutrophils Latest Units: % 49 28   % Lymphocytes Latest Units: % 44 52   % Monocytes Latest Units: % 4 12   % Eosinophils Latest Units: % 3 8   Absolute Basophils Latest Ref Range: 0.0 - 0.2 10e3/uL 0.0 0.0   % Basophils Latest Units: % 0 0   NRBC/W Latest Ref Range: <=0 %  2 (H)   Absolute Neutrophil Latest Ref Range: 1.6 - 8.3 10e3/uL 1.3 (L) 0.6 (L)   Absolute Lymphocytes Latest Ref Range: 0.8 - 5.3 10e3/uL 1.1 1.1   Absolute Monocytes Latest Ref Range: 0.0 - 1.3 10e3/uL 0.1 0.3   Absolute Eosinophils Latest Ref Range: 0.0 - 0.7 10e3/uL 0.1 0.2   Absolute NRBCs Latest Ref Range: <=0.0 10e3/uL  0.0   RBC Morphology Unknown Confirmed RBC Indices Confirmed RBC Indices   Platelet Morphology Latest Ref Range: Automated Count Confirmed. Platelet morphology is normal.  Automated Count Confirmed. Platelet morphology is normal. Automated Count Confirmed. Platelet morphology is normal.   RBC Fragments Latest Ref Range: None Seen   Slight (A)   Acanthocytes Latest Ref Range: None Seen   Slight (A)   Target Cells Latest Ref Range: None Seen   Slight (A)   % Retic Latest Ref Range: 0.5 - 2.0 % 1.5 2.0   Absolute Retic Latest Ref Range: 0.025 - 0.095 10e6/uL 0.046 0.055       Results for  JLLISSETH (MRN [id number]) as of 9/2/2021 17:56   Ref. Range 8/6/2021 13:12 9/2/2021 16:34   Sodium Latest Ref Range: 133 - 144 mmol/L 140 141   Potassium Latest Ref Range: 3.4 - 5.3 mmol/L 3.9 4.1   Chloride Latest Ref Range: 94 - 109 mmol/L 111 (H) 110 (H)   Carbon Dioxide Latest Ref Range: 20 - 32 mmol/L 26 27   Urea Nitrogen Latest Ref Range: 7 - 30 mg/dL 26 24   Creatinine Latest Ref Range: 0.52 - 1.04 mg/dL 1.70 (H) 1.28 (H)   GFR Estimate Latest Ref Range: >60 mL/min/1.73m2 29 (L) 40 (L)   Calcium Latest Ref Range: 8.5 - 10.1 mg/dL 8.1 (L) 9.0   Anion Gap Latest Ref Range: 3 - 14 mmol/L 3 4   Albumin Latest Ref Range: 3.4 - 5.0 g/dL 2.4 (L) 2.6 (L)   Protein Total Latest Ref Range: 6.8 - 8.8 g/dL 7.1 7.2   Bilirubin Total Latest Ref Range: 0.2 - 1.3 mg/dL 0.6 0.5   Alkaline Phosphatase Latest Ref Range: 40 - 150 U/L 78 79   ALT Latest Ref Range: 0 - 50 U/L 18 15   AST Latest Ref Range: 0 - 45 U/L 23 15   Erythropoietin Latest Ref Range: 4 - 27 mU/mL 151 (H)    Ferritin Latest Ref Range: 8 - 252 ng/mL 16    Uric Acid Latest Ref Range: 2.6 - 6.0 mg/dL 4.5    Glucose Latest Ref Range: 70 - 99 mg/dL 121 (H) 104 (H)   WBC Latest Ref Range: 4.0 - 11.0 10e3/uL 2.6 (L) 2.1 (L)   Hemoglobin Latest Ref Range: 11.7 - 15.7 g/dL 8.8 (L) 8.9 (L)   Hematocrit Latest Ref Range: 35.0 - 47.0 % 28.5 (L) 27.8 (L)   Platelet Count Latest Ref Range: 150 - 450 10e3/uL 86 (L) 74 (L)   RBC Count Latest Ref Range: 3.80 - 5.20 10e6/uL 3.01 (L) 2.80 (L)   MCV Latest Ref Range: 78 - 100 fL 95 99   MCH Latest Ref Range: 26.5 - 33.0 pg 29.2 31.8   MCHC Latest Ref Range: 31.5 - 36.5 g/dL 30.9 (L) 32.0   RDW Latest Ref Range: 10.0 - 15.0 % 25.6 (H) 25.1 (H)   % Neutrophils Latest Units: % 49 28   % Lymphocytes Latest Units: % 44 52   % Monocytes Latest Units: % 4 12   % Eosinophils Latest Units: % 3 8   Absolute Basophils Latest Ref Range: 0.0 - 0.2 10e3/uL 0.0 0.0   % Basophils Latest Units: % 0 0   NRBC/W Latest Ref Range:  <=0 %  2 (H)   Absolute Neutrophil Latest Ref Range: 1.6 - 8.3 10e3/uL 1.3 (L) 0.6 (L)   Absolute Lymphocytes Latest Ref Range: 0.8 - 5.3 10e3/uL 1.1 1.1   Absolute Monocytes Latest Ref Range: 0.0 - 1.3 10e3/uL 0.1 0.3   Absolute Eosinophils Latest Ref Range: 0.0 - 0.7 10e3/uL 0.1 0.2   Absolute NRBCs Latest Ref Range: <=0.0 10e3/uL  0.0   RBC Morphology Unknown Confirmed RBC Indices Confirmed RBC Indices   Platelet Morphology Latest Ref Range: Automated Count Confirmed. Platelet morphology is normal.  Automated Count Confirmed. Platelet morphology is normal. Automated Count Confirmed. Platelet morphology is normal.   RBC Fragments Latest Ref Range: None Seen   Slight (A)   Acanthocytes Latest Ref Range: None Seen   Slight (A)   Target Cells Latest Ref Range: None Seen   Slight (A)   % Retic Latest Ref Range: 0.5 - 2.0 % 1.5 2.0   Absolute Retic Latest Ref Range: 0.025 - 0.095 10e6/uL 0.046 0.055

## 2021-09-02 NOTE — NURSING NOTE
Chief Complaint   Patient presents with     Blood Draw     Labs drawn via  by rn in lab. VS taken.     Labs collected from venipuncture by RN. Vitals taken. Checked in for appointment(s).    Shakeel Feliz RN

## 2021-09-02 NOTE — NURSING NOTE
"Oncology Rooming Note    September 2, 2021 5:06 PM   Bonny Raymundo is a 75 year old female who presents for:    Chief Complaint   Patient presents with     Blood Draw     Labs drawn via  by rn in lab. VS taken.     Oncology Clinic Visit     aplatic anemia      Initial Vitals: /82 (BP Location: Right arm, Patient Position: Sitting, Cuff Size: Adult Regular)   Pulse 82   Temp 97.9  F (36.6  C) (Oral)   Resp 16   Wt 55.2 kg (121 lb 11.2 oz)   SpO2 98%   BMI 21.56 kg/m   Estimated body mass index is 21.56 kg/m  as calculated from the following:    Height as of 3/24/21: 1.6 m (5' 3\").    Weight as of this encounter: 55.2 kg (121 lb 11.2 oz). Body surface area is 1.57 meters squared.  No Pain (0) Comment: Data Unavailable   No LMP recorded. Patient has had a hysterectomy.  Allergies reviewed: Yes  Medications reviewed: Yes    Medications: Medication refills not needed today.  Pharmacy name entered into [facility]:    [facility], [place] - [address]  [facility] [place], [place] - [address]  [facility] [place] [place] - [address] [place] [address]  WRITTEN PRESCRIPTION REQUESTED  [id number], [place] - [address]  [id number] [place], [place] - [address] [address] [facility]    Clinical concerns: None.       Amy Abreu CMA            "

## 2021-09-05 ENCOUNTER — HEALTH MAINTENANCE LETTER (OUTPATIENT)
Age: 76
End: 2021-09-05

## 2021-09-30 ENCOUNTER — LAB (OUTPATIENT)
Dept: INFUSION THERAPY | Facility: CLINIC | Age: 76
End: 2021-09-30
Attending: INTERNAL MEDICINE
Payer: COMMERCIAL

## 2021-09-30 DIAGNOSIS — D61.3 IDIOPATHIC APLASTIC ANEMIA (H): ICD-10-CM

## 2021-09-30 LAB
BASOPHILS # BLD MANUAL: 0 10E3/UL (ref 0–0.2)
BASOPHILS NFR BLD MANUAL: 0 %
EOSINOPHIL # BLD MANUAL: 0.1 10E3/UL (ref 0–0.7)
EOSINOPHIL NFR BLD MANUAL: 4 %
ERYTHROCYTE [DISTWIDTH] IN BLOOD BY AUTOMATED COUNT: 21.5 % (ref 10–15)
HCT VFR BLD AUTO: 28.1 % (ref 35–47)
HGB BLD-MCNC: 8.7 G/DL (ref 11.7–15.7)
LYMPHOCYTES # BLD MANUAL: 1.2 10E3/UL (ref 0.8–5.3)
LYMPHOCYTES NFR BLD MANUAL: 64 %
MCH RBC QN AUTO: 33.1 PG (ref 26.5–33)
MCHC RBC AUTO-ENTMCNC: 31 G/DL (ref 31.5–36.5)
MCV RBC AUTO: 107 FL (ref 78–100)
MONOCYTES # BLD MANUAL: 0.3 10E3/UL (ref 0–1.3)
MONOCYTES NFR BLD MANUAL: 16 %
NEUTROPHILS # BLD MANUAL: 0.3 10E3/UL (ref 1.6–8.3)
NEUTROPHILS NFR BLD MANUAL: 16 %
PLAT MORPH BLD: ABNORMAL
PLATELET # BLD AUTO: 87 10E3/UL (ref 150–450)
RBC # BLD AUTO: 2.63 10E6/UL (ref 3.8–5.2)
RBC MORPH BLD: ABNORMAL
SMUDGE CELLS BLD QL SMEAR: PRESENT
WBC # BLD AUTO: 1.9 10E3/UL (ref 4–11)

## 2021-09-30 PROCEDURE — 85027 COMPLETE CBC AUTOMATED: CPT | Performed by: PHYSICIAN ASSISTANT

## 2021-09-30 PROCEDURE — 36415 COLL VENOUS BLD VENIPUNCTURE: CPT

## 2021-10-01 DIAGNOSIS — D61.3 IDIOPATHIC APLASTIC ANEMIA (H): Primary | ICD-10-CM

## 2021-10-14 ENCOUNTER — LAB (OUTPATIENT)
Dept: INFUSION THERAPY | Facility: CLINIC | Age: 76
End: 2021-10-14
Attending: INTERNAL MEDICINE
Payer: COMMERCIAL

## 2021-10-14 ENCOUNTER — TRANSFERRED RECORDS (OUTPATIENT)
Dept: HEALTH INFORMATION MANAGEMENT | Facility: CLINIC | Age: 76
End: 2021-10-14
Payer: COMMERCIAL

## 2021-10-14 DIAGNOSIS — D61.3 IDIOPATHIC APLASTIC ANEMIA (H): ICD-10-CM

## 2021-10-14 LAB
ALBUMIN SERPL-MCNC: 2.9 G/DL (ref 3.4–5)
ALP SERPL-CCNC: 92 U/L (ref 40–150)
ALT SERPL W P-5'-P-CCNC: 17 U/L (ref 0–50)
ANION GAP SERPL CALCULATED.3IONS-SCNC: 7 MMOL/L (ref 3–14)
AST SERPL W P-5'-P-CCNC: 22 U/L (ref 0–45)
BASOPHILS # BLD AUTO: 0 10E3/UL (ref 0–0.2)
BASOPHILS NFR BLD AUTO: 0 %
BILIRUB SERPL-MCNC: 0.6 MG/DL (ref 0.2–1.3)
BUN SERPL-MCNC: 21 MG/DL (ref 7–30)
CALCIUM SERPL-MCNC: 9.1 MG/DL (ref 8.5–10.1)
CHLORIDE BLD-SCNC: 108 MMOL/L (ref 94–109)
CO2 SERPL-SCNC: 25 MMOL/L (ref 20–32)
CREAT SERPL-MCNC: 1.33 MG/DL (ref 0.52–1.04)
EOSINOPHIL # BLD AUTO: 0.1 10E3/UL (ref 0–0.7)
EOSINOPHIL NFR BLD AUTO: 4 %
ERYTHROCYTE [DISTWIDTH] IN BLOOD BY AUTOMATED COUNT: 20.1 % (ref 10–15)
GFR SERPL CREATININE-BSD FRML MDRD: 39 ML/MIN/1.73M2
GLUCOSE BLD-MCNC: 96 MG/DL (ref 70–99)
HCT VFR BLD AUTO: 31.7 % (ref 35–47)
HGB BLD-MCNC: 9.6 G/DL (ref 11.7–15.7)
IMM GRANULOCYTES # BLD: 0 10E3/UL
IMM GRANULOCYTES NFR BLD: 0 %
LYMPHOCYTES # BLD AUTO: 1.2 10E3/UL (ref 0.8–5.3)
LYMPHOCYTES NFR BLD AUTO: 52 %
MCH RBC QN AUTO: 32.9 PG (ref 26.5–33)
MCHC RBC AUTO-ENTMCNC: 30.3 G/DL (ref 31.5–36.5)
MCV RBC AUTO: 109 FL (ref 78–100)
MONOCYTES # BLD AUTO: 0.4 10E3/UL (ref 0–1.3)
MONOCYTES NFR BLD AUTO: 17 %
NEUTROPHILS # BLD AUTO: 0.6 10E3/UL (ref 1.6–8.3)
NEUTROPHILS NFR BLD AUTO: 27 %
NRBC # BLD AUTO: 0 10E3/UL
NRBC BLD AUTO-RTO: 1 /100
PLATELET # BLD AUTO: 133 10E3/UL (ref 150–450)
POTASSIUM BLD-SCNC: 4.1 MMOL/L (ref 3.4–5.3)
PROT SERPL-MCNC: 7.8 G/DL (ref 6.8–8.8)
RBC # BLD AUTO: 2.92 10E6/UL (ref 3.8–5.2)
RETICS # AUTO: 0.07 10E6/UL (ref 0.03–0.1)
RETICS/RBC NFR AUTO: 2.3 % (ref 0.5–2)
SODIUM SERPL-SCNC: 140 MMOL/L (ref 133–144)
URATE SERPL-MCNC: 3.6 MG/DL (ref 2.6–6)
WBC # BLD AUTO: 2.3 10E3/UL (ref 4–11)

## 2021-10-14 PROCEDURE — 84550 ASSAY OF BLOOD/URIC ACID: CPT | Performed by: INTERNAL MEDICINE

## 2021-10-14 PROCEDURE — 85025 COMPLETE CBC W/AUTO DIFF WBC: CPT | Performed by: INTERNAL MEDICINE

## 2021-10-14 PROCEDURE — 36415 COLL VENOUS BLD VENIPUNCTURE: CPT

## 2021-10-14 PROCEDURE — 85045 AUTOMATED RETICULOCYTE COUNT: CPT | Performed by: INTERNAL MEDICINE

## 2021-10-14 PROCEDURE — 80053 COMPREHEN METABOLIC PANEL: CPT | Performed by: INTERNAL MEDICINE

## 2021-11-03 DIAGNOSIS — H35.371 EPIRETINAL MEMBRANE (ERM) OF RIGHT EYE: Primary | ICD-10-CM

## 2021-11-04 ENCOUNTER — OFFICE VISIT (OUTPATIENT)
Dept: TRANSPLANT | Facility: CLINIC | Age: 76
End: 2021-11-04
Attending: INTERNAL MEDICINE
Payer: COMMERCIAL

## 2021-11-04 ENCOUNTER — APPOINTMENT (OUTPATIENT)
Dept: LAB | Facility: CLINIC | Age: 76
End: 2021-11-04
Attending: INTERNAL MEDICINE
Payer: COMMERCIAL

## 2021-11-04 ENCOUNTER — OFFICE VISIT (OUTPATIENT)
Dept: OPHTHALMOLOGY | Facility: CLINIC | Age: 76
End: 2021-11-04
Attending: OPHTHALMOLOGY
Payer: COMMERCIAL

## 2021-11-04 VITALS
WEIGHT: 118.5 LBS | BODY MASS INDEX: 20.99 KG/M2 | HEART RATE: 104 BPM | TEMPERATURE: 97.9 F | OXYGEN SATURATION: 97 % | SYSTOLIC BLOOD PRESSURE: 123 MMHG | DIASTOLIC BLOOD PRESSURE: 73 MMHG | RESPIRATION RATE: 18 BRPM

## 2021-11-04 DIAGNOSIS — H50.32 INTERMITTENT ESOTROPIA, ALTERNATING: Primary | ICD-10-CM

## 2021-11-04 DIAGNOSIS — H35.371 EPIRETINAL MEMBRANE (ERM) OF RIGHT EYE: ICD-10-CM

## 2021-11-04 DIAGNOSIS — D61.3 IDIOPATHIC APLASTIC ANEMIA (H): ICD-10-CM

## 2021-11-04 DIAGNOSIS — D69.8 OTHER SPECIFIED HEMORRHAGIC CONDITIONS (H): ICD-10-CM

## 2021-11-04 DIAGNOSIS — D50.8 OTHER IRON DEFICIENCY ANEMIA: ICD-10-CM

## 2021-11-04 LAB
ALBUMIN SERPL-MCNC: 3 G/DL (ref 3.4–5)
ALP SERPL-CCNC: 101 U/L (ref 40–150)
ALT SERPL W P-5'-P-CCNC: 20 U/L (ref 0–50)
ANION GAP SERPL CALCULATED.3IONS-SCNC: 5 MMOL/L (ref 3–14)
AST SERPL W P-5'-P-CCNC: 20 U/L (ref 0–45)
BASOPHILS # BLD MANUAL: 0 10E3/UL (ref 0–0.2)
BASOPHILS NFR BLD MANUAL: 0 %
BILIRUB SERPL-MCNC: 0.6 MG/DL (ref 0.2–1.3)
BUN SERPL-MCNC: 24 MG/DL (ref 7–30)
CALCIUM SERPL-MCNC: 8.9 MG/DL (ref 8.5–10.1)
CHLORIDE BLD-SCNC: 108 MMOL/L (ref 94–109)
CO2 SERPL-SCNC: 26 MMOL/L (ref 20–32)
CREAT SERPL-MCNC: 1.33 MG/DL (ref 0.52–1.04)
DACRYOCYTES BLD QL SMEAR: SLIGHT
EOSINOPHIL # BLD MANUAL: 0.1 10E3/UL (ref 0–0.7)
EOSINOPHIL NFR BLD MANUAL: 4 %
ERYTHROCYTE [DISTWIDTH] IN BLOOD BY AUTOMATED COUNT: 19.1 % (ref 10–15)
GFR SERPL CREATININE-BSD FRML MDRD: 39 ML/MIN/1.73M2
GLUCOSE BLD-MCNC: 110 MG/DL (ref 70–99)
HCT VFR BLD AUTO: 29.3 % (ref 35–47)
HGB BLD-MCNC: 9.1 G/DL (ref 11.7–15.7)
HOLD SPECIMEN: NORMAL
HOLD SPECIMEN: NORMAL
LYMPHOCYTES # BLD MANUAL: 1.2 10E3/UL (ref 0.8–5.3)
LYMPHOCYTES NFR BLD MANUAL: 58 %
MCH RBC QN AUTO: 34.5 PG (ref 26.5–33)
MCHC RBC AUTO-ENTMCNC: 31.1 G/DL (ref 31.5–36.5)
MCV RBC AUTO: 111 FL (ref 78–100)
MONOCYTES # BLD MANUAL: 0.3 10E3/UL (ref 0–1.3)
MONOCYTES NFR BLD MANUAL: 14 %
NEUTROPHILS # BLD MANUAL: 0.5 10E3/UL (ref 1.6–8.3)
NEUTROPHILS NFR BLD MANUAL: 24 %
NRBC # BLD AUTO: 0.1 10E3/UL
NRBC BLD MANUAL-RTO: 3 %
PLAT MORPH BLD: ABNORMAL
PLATELET # BLD AUTO: 133 10E3/UL (ref 150–450)
POLYCHROMASIA BLD QL SMEAR: SLIGHT
POTASSIUM BLD-SCNC: 3.9 MMOL/L (ref 3.4–5.3)
PROT SERPL-MCNC: 7.8 G/DL (ref 6.8–8.8)
RBC # BLD AUTO: 2.64 10E6/UL (ref 3.8–5.2)
RBC MORPH BLD: ABNORMAL
SODIUM SERPL-SCNC: 139 MMOL/L (ref 133–144)
URATE SERPL-MCNC: 4.4 MG/DL (ref 2.6–6)
WBC # BLD AUTO: 2 10E3/UL (ref 4–11)

## 2021-11-04 PROCEDURE — 85027 COMPLETE CBC AUTOMATED: CPT | Performed by: INTERNAL MEDICINE

## 2021-11-04 PROCEDURE — G0463 HOSPITAL OUTPT CLINIC VISIT: HCPCS

## 2021-11-04 PROCEDURE — 92134 CPTRZ OPH DX IMG PST SGM RTA: CPT | Performed by: OPHTHALMOLOGY

## 2021-11-04 PROCEDURE — 36415 COLL VENOUS BLD VENIPUNCTURE: CPT | Performed by: INTERNAL MEDICINE

## 2021-11-04 PROCEDURE — 92014 COMPRE OPH EXAM EST PT 1/>: CPT | Performed by: OPHTHALMOLOGY

## 2021-11-04 PROCEDURE — 84550 ASSAY OF BLOOD/URIC ACID: CPT | Performed by: INTERNAL MEDICINE

## 2021-11-04 PROCEDURE — 92060 SENSORIMOTOR EXAMINATION: CPT | Performed by: OPHTHALMOLOGY

## 2021-11-04 PROCEDURE — 92060 SENSORIMOTOR EXAMINATION: CPT | Mod: 26 | Performed by: OPHTHALMOLOGY

## 2021-11-04 PROCEDURE — 80053 COMPREHEN METABOLIC PANEL: CPT | Performed by: INTERNAL MEDICINE

## 2021-11-04 PROCEDURE — 99214 OFFICE O/P EST MOD 30 MIN: CPT | Performed by: INTERNAL MEDICINE

## 2021-11-04 ASSESSMENT — REFRACTION_WEARINGRX
OS_AXIS: 159
OD_VPRISM: 1 BD
OD_AXIS: 060
OD_ADD: +2.75
OD_SPHERE: -2.50
OS_HPRISM: 3 BO
OD_CYLINDER: +2.75
OS_ADD: +2.75
OS_CYLINDER: +2.25
OD_HPRISM: 3 BO
OS_SPHERE: -1.25
SPECS_TYPE: PAL
OS_VPRISM: 1 BU

## 2021-11-04 ASSESSMENT — TONOMETRY
IOP_METHOD: ICARE
OS_IOP_MMHG: 15
OD_IOP_MMHG: 13

## 2021-11-04 ASSESSMENT — CUP TO DISC RATIO
OS_RATIO: 0.4
OD_RATIO: 0.4

## 2021-11-04 ASSESSMENT — SLIT LAMP EXAM - LIDS
COMMENTS: NORMAL
COMMENTS: NORMAL

## 2021-11-04 ASSESSMENT — VISUAL ACUITY
METHOD: SNELLEN - LINEAR
OS_SC: 20/20
OD_SC: 20/20
OD_SC+: -2

## 2021-11-04 ASSESSMENT — EXTERNAL EXAM - RIGHT EYE: OD_EXAM: NORMAL

## 2021-11-04 ASSESSMENT — CONF VISUAL FIELD
METHOD: COUNTING FINGERS
OS_NORMAL: 1
OD_NORMAL: 1

## 2021-11-04 ASSESSMENT — PAIN SCALES - GENERAL: PAINLEVEL: NO PAIN (0)

## 2021-11-04 NOTE — LETTER
11/4/2021         RE: Bonny Raymundo  [address]  [facility] [id number]        Dear Colleague,    Thank you for referring your patient, Bonny Raymundo, to the [facility] [place]. Please see a copy of my visit note below.    /73 (BP Location: Right arm, Patient Position: Sitting, Cuff Size: Adult Regular)   Pulse 104   Temp 97.9  F (36.6  C) (Oral)   Resp 18   Wt 53.8 kg (118 lb 8 oz)   SpO2 97%   BMI 20.99 kg/m    Wt Readings from Last 4 Encounters:   11/04/21 53.8 kg (118 lb 8 oz)   09/02/21 55.2 kg (121 lb 11.2 oz)   07/29/21 54 kg (119 lb)   05/27/21 56.7 kg (124 lb 14.4 oz)     Bonny returns to follow-up her aplastic anemia.  She has been off immunosuppression for some time but has not had fever chills rash bleeding or bruising.  Her appetite has been down a bit recently but not associated with vomiting or diarrhea.  She has had no external bleeding and continues her anticoagulation therapy.  She has no new GI  or respiratory symptoms and the rest of her review of systems is unrevealing.    Her exam shows her weight down 3 kg in the last 2 months but her vital signs are acceptable.  She has no notable bruises or petechiae.  There is no inflammatory skin rash.  She has no cervical supraclavicular or inguinal lymphadenopathy.  Her oropharynx is clear without mucosal lesions though I did not look under her dentures.  Her lungs are clear her heart tones are regular without a gallop and her abdomen is soft there is no abdominal tenderness or palpable masses or hepatosplenomegaly.  She has no peripheral edema.    Laboratory testing showed stable but lowish blood counts and her chemistries are acceptable.    She had a recent eye clinic visit with no new ocular findings.    She is due to get her third Covid vaccination soon and we discussed how to keep her self safe amongst family members who have not chosen to be vaccinated over holiday  get-togethers.    We will check her labs again in 3 weeks and I will see her in 6 weeks also with labs she knows to call if additional problems arise    Rafita Rogel MD    Professor of medicine    Results for LISSETH PARIKH (MRN [id number]) as of 11/4/2021 17:23   Ref. Range 10/14/2021 11:03 11/4/2021 15:18   Sodium Latest Ref Range: 133 - 144 mmol/L 140 139   Potassium Latest Ref Range: 3.4 - 5.3 mmol/L 4.1 3.9   Chloride Latest Ref Range: 94 - 109 mmol/L 108 108   Carbon Dioxide Latest Ref Range: 20 - 32 mmol/L 25 26   Urea Nitrogen Latest Ref Range: 7 - 30 mg/dL 21 24   Creatinine Latest Ref Range: 0.52 - 1.04 mg/dL 1.33 (H) 1.33 (H)   GFR Estimate Latest Ref Range: >60 mL/min/1.73m2 39 (L) 39 (L)   Calcium Latest Ref Range: 8.5 - 10.1 mg/dL 9.1 8.9   Anion Gap Latest Ref Range: 3 - 14 mmol/L 7 5   Albumin Latest Ref Range: 3.4 - 5.0 g/dL 2.9 (L) 3.0 (L)   Protein Total Latest Ref Range: 6.8 - 8.8 g/dL 7.8 7.8   Bilirubin Total Latest Ref Range: 0.2 - 1.3 mg/dL 0.6 0.6   Alkaline Phosphatase Latest Ref Range: 40 - 150 U/L 92 101   ALT Latest Ref Range: 0 - 50 U/L 17 20   AST Latest Ref Range: 0 - 45 U/L 22 20   Uric Acid Latest Ref Range: 2.6 - 6.0 mg/dL 3.6 4.4   Glucose Latest Ref Range: 70 - 99 mg/dL 96 110 (H)   WBC Latest Ref Range: 4.0 - 11.0 10e3/uL 2.3 (L) 2.0 (L)   Hemoglobin Latest Ref Range: 11.7 - 15.7 g/dL 9.6 (L) 9.1 (L)   Hematocrit Latest Ref Range: 35.0 - 47.0 % 31.7 (L) 29.3 (L)   Platelet Count Latest Ref Range: 150 - 450 10e3/uL 133 (L) 133 (L)   RBC Count Latest Ref Range: 3.80 - 5.20 10e6/uL 2.92 (L) 2.64 (L)   MCV Latest Ref Range: 78 - 100 fL 109 (H) 111 (H)   MCH Latest Ref Range: 26.5 - 33.0 pg 32.9 34.5 (H)   MCHC Latest Ref Range: 31.5 - 36.5 g/dL 30.3 (L) 31.1 (L)   RDW Latest Ref Range: 10.0 - 15.0 % 20.1 (H) 19.1 (H)   % Neutrophils Latest Units: % 27 24   % Lymphocytes Latest Units: % 52 58   % Monocytes Latest Units: % 17 14   % Eosinophils Latest Units: % 4 4   %  Basophils Latest Units: % 0 0   Absolute Basophils Latest Ref Range: 0.0 - 0.2 10e3/uL 0.0    Absolute Basophils Latest Ref Range: 0.0 - 0.2 10e3/uL  0.0   NRBC/W Latest Ref Range: <=0 %  3 (H)   Absolute Neutrophil Latest Ref Range: 1.6 - 8.3 10e3/uL  0.5 (L)   Absolute Lymphocytes Latest Ref Range: 0.8 - 5.3 10e3/uL  1.2   Absolute Monocytes Latest Ref Range: 0.0 - 1.3 10e3/uL  0.3   Absolute Eosinophils Latest Ref Range: 0.0 - 0.7 10e3/uL  0.1   Absolute Eosinophils Latest Ref Range: 0.0 - 0.7 10e3/uL 0.1    Absolute Immature Granulocytes Latest Ref Range: <=0.0 10e3/uL 0.0    Absolute Lymphocytes Latest Ref Range: 0.8 - 5.3 10e3/uL 1.2    Absolute Monocytes Latest Ref Range: 0.0 - 1.3 10e3/uL 0.4    % Immature Granulocytes Latest Units: % 0    Absolute Neutrophils Latest Ref Range: 1.6 - 8.3 10e3/uL 0.6 (L)    Absolute NRBCs Latest Units: 10e3/uL 0.0    Absolute NRBCs Latest Ref Range: <=0.0 10e3/uL  0.1 (H)   NRBCs per 100 WBC Latest Ref Range: <1 /100 1 (H)    RBC Morphology Unknown  Confirmed RBC Indices   Platelet Morphology Latest Ref Range: Automated Count Confirmed. Platelet morphology is normal.   Automated Count Confirmed. Platelet morphology is normal.   Polychromasia Latest Ref Range: None Seen   Slight (A)   Teardrop Cells Latest Ref Range: None Seen   Slight (A)   % Retic Latest Ref Range: 0.5 - 2.0 % 2.3 (H)    Absolute Retic Latest Ref Range: 0.025 - 0.095 10e6/uL 0.068          Sincerely,  Rafita Rogel MD

## 2021-11-04 NOTE — NURSING NOTE
Chief Complaints and History of Present Illnesses   Patient presents with     Diplopia Follow-Up     Retina Follow Up     Chief Complaint(s) and History of Present Illness(es)     Diplopia Follow-Up     Laterality: right eye    Onset: recurrent    Treatments tried: glasses and closing one eye    Response to treatment: mild improvement              Retina Follow Up               Comments     Pt here for retina follow up. No eye health concerns.  Diplopia continues. Would like orthoptist to check prism today.    Ocular meds = none    Kari CHRISTINA, MAYCO 12:48 PM 11/04/2021

## 2021-11-04 NOTE — NURSING NOTE
"Oncology Rooming Note    November 4, 2021 3:37 PM   Bonny Raymundo is a 75 year old female who presents for:    Chief Complaint   Patient presents with     Blood Draw     Labs drawn via  by RN in lab. VS taken.      Oncology Clinic Visit     aplastic anemia     Initial Vitals: /73 (BP Location: Right arm, Patient Position: Sitting, Cuff Size: Adult Regular)   Pulse 104   Temp 97.9  F (36.6  C) (Oral)   Resp 18   Wt 53.8 kg (118 lb 8 oz)   SpO2 97%   BMI 20.99 kg/m   Estimated body mass index is 20.99 kg/m  as calculated from the following:    Height as of 3/24/21: 1.6 m (5' 3\").    Weight as of this encounter: 53.8 kg (118 lb 8 oz). Body surface area is 1.55 meters squared.  No Pain (0) Comment: Data Unavailable   No LMP recorded. Patient has had a hysterectomy.  Allergies reviewed: Yes  Medications reviewed: Yes    Medications: Medication refills not needed today.  Pharmacy name entered into [facility]:    [facility] [place], [place] - [address]  [facility] [place], [place] - [address]  [facility] [place], [place] - [address] [facility]  WRITTEN PRESCRIPTION REQUESTED  [id number], [place] - [address]  [id number] [place], [place] - [address] [facility]    Clinical concerns: Had a sore mouth last night. Also has medication questions      Lottie Oshea CMA            "

## 2021-11-04 NOTE — NURSING NOTE
Chief Complaint   Patient presents with     Blood Draw     Labs drawn via  by RN in lab. VS taken.      Labs collected from venipuncture by RN. Vitals taken. Checked in for appointment(s).    Roxi Melgar RN

## 2021-11-04 NOTE — PROGRESS NOTES
/73 (BP Location: Right arm, Patient Position: Sitting, Cuff Size: Adult Regular)   Pulse 104   Temp 97.9  F (36.6  C) (Oral)   Resp 18   Wt 53.8 kg (118 lb 8 oz)   SpO2 97%   BMI 20.99 kg/m    Wt Readings from Last 4 Encounters:   11/04/21 53.8 kg (118 lb 8 oz)   09/02/21 55.2 kg (121 lb 11.2 oz)   07/29/21 54 kg (119 lb)   05/27/21 56.7 kg (124 lb 14.4 oz)     Lisseth returns to follow-up her aplastic anemia.  She has been off immunosuppression for some time but has not had fever chills rash bleeding or bruising.  Her appetite has been down a bit recently but not associated with vomiting or diarrhea.  She has had no external bleeding and continues her anticoagulation therapy.  She has no new GI  or respiratory symptoms and the rest of her review of systems is unrevealing.    Her exam shows her weight down 3 kg in the last 2 months but her vital signs are acceptable.  She has no notable bruises or petechiae.  There is no inflammatory skin rash.  She has no cervical supraclavicular or inguinal lymphadenopathy.  Her oropharynx is clear without mucosal lesions though I did not look under her dentures.  Her lungs are clear her heart tones are regular without a gallop and her abdomen is soft there is no abdominal tenderness or palpable masses or hepatosplenomegaly.  She has no peripheral edema.    Laboratory testing showed stable but lowish blood counts and her chemistries are acceptable.    She had a recent eye clinic visit with no new ocular findings.    She is due to get her third Covid vaccination soon and we discussed how to keep her self safe amongst family members who have not chosen to be vaccinated over holiday get-togethers.    We will check her labs again in 3 weeks and I will see her in 6 weeks also with labs she knows to call if additional problems arise    Rafita Rogel MD    Professor of medicine    Results for JL LISSETH JL PAK (MRN [id number]) as of 11/4/2021 17:23   Ref. Range  10/14/2021 11:03 11/4/2021 15:18   Sodium Latest Ref Range: 133 - 144 mmol/L 140 139   Potassium Latest Ref Range: 3.4 - 5.3 mmol/L 4.1 3.9   Chloride Latest Ref Range: 94 - 109 mmol/L 108 108   Carbon Dioxide Latest Ref Range: 20 - 32 mmol/L 25 26   Urea Nitrogen Latest Ref Range: 7 - 30 mg/dL 21 24   Creatinine Latest Ref Range: 0.52 - 1.04 mg/dL 1.33 (H) 1.33 (H)   GFR Estimate Latest Ref Range: >60 mL/min/1.73m2 39 (L) 39 (L)   Calcium Latest Ref Range: 8.5 - 10.1 mg/dL 9.1 8.9   Anion Gap Latest Ref Range: 3 - 14 mmol/L 7 5   Albumin Latest Ref Range: 3.4 - 5.0 g/dL 2.9 (L) 3.0 (L)   Protein Total Latest Ref Range: 6.8 - 8.8 g/dL 7.8 7.8   Bilirubin Total Latest Ref Range: 0.2 - 1.3 mg/dL 0.6 0.6   Alkaline Phosphatase Latest Ref Range: 40 - 150 U/L 92 101   ALT Latest Ref Range: 0 - 50 U/L 17 20   AST Latest Ref Range: 0 - 45 U/L 22 20   Uric Acid Latest Ref Range: 2.6 - 6.0 mg/dL 3.6 4.4   Glucose Latest Ref Range: 70 - 99 mg/dL 96 110 (H)   WBC Latest Ref Range: 4.0 - 11.0 10e3/uL 2.3 (L) 2.0 (L)   Hemoglobin Latest Ref Range: 11.7 - 15.7 g/dL 9.6 (L) 9.1 (L)   Hematocrit Latest Ref Range: 35.0 - 47.0 % 31.7 (L) 29.3 (L)   Platelet Count Latest Ref Range: 150 - 450 10e3/uL 133 (L) 133 (L)   RBC Count Latest Ref Range: 3.80 - 5.20 10e6/uL 2.92 (L) 2.64 (L)   MCV Latest Ref Range: 78 - 100 fL 109 (H) 111 (H)   MCH Latest Ref Range: 26.5 - 33.0 pg 32.9 34.5 (H)   MCHC Latest Ref Range: 31.5 - 36.5 g/dL 30.3 (L) 31.1 (L)   RDW Latest Ref Range: 10.0 - 15.0 % 20.1 (H) 19.1 (H)   % Neutrophils Latest Units: % 27 24   % Lymphocytes Latest Units: % 52 58   % Monocytes Latest Units: % 17 14   % Eosinophils Latest Units: % 4 4   % Basophils Latest Units: % 0 0   Absolute Basophils Latest Ref Range: 0.0 - 0.2 10e3/uL 0.0    Absolute Basophils Latest Ref Range: 0.0 - 0.2 10e3/uL  0.0   NRBC/W Latest Ref Range: <=0 %  3 (H)   Absolute Neutrophil Latest Ref Range: 1.6 - 8.3 10e3/uL  0.5 (L)   Absolute Lymphocytes Latest  Ref Range: 0.8 - 5.3 10e3/uL  1.2   Absolute Monocytes Latest Ref Range: 0.0 - 1.3 10e3/uL  0.3   Absolute Eosinophils Latest Ref Range: 0.0 - 0.7 10e3/uL  0.1   Absolute Eosinophils Latest Ref Range: 0.0 - 0.7 10e3/uL 0.1    Absolute Immature Granulocytes Latest Ref Range: <=0.0 10e3/uL 0.0    Absolute Lymphocytes Latest Ref Range: 0.8 - 5.3 10e3/uL 1.2    Absolute Monocytes Latest Ref Range: 0.0 - 1.3 10e3/uL 0.4    % Immature Granulocytes Latest Units: % 0    Absolute Neutrophils Latest Ref Range: 1.6 - 8.3 10e3/uL 0.6 (L)    Absolute NRBCs Latest Units: 10e3/uL 0.0    Absolute NRBCs Latest Ref Range: <=0.0 10e3/uL  0.1 (H)   NRBCs per 100 WBC Latest Ref Range: <1 /100 1 (H)    RBC Morphology Unknown  Confirmed RBC Indices   Platelet Morphology Latest Ref Range: Automated Count Confirmed. Platelet morphology is normal.   Automated Count Confirmed. Platelet morphology is normal.   Polychromasia Latest Ref Range: None Seen   Slight (A)   Teardrop Cells Latest Ref Range: None Seen   Slight (A)   % Retic Latest Ref Range: 0.5 - 2.0 % 2.3 (H)    Absolute Retic Latest Ref Range: 0.025 - 0.095 10e6/uL 0.068

## 2021-11-04 NOTE — PROGRESS NOTES
CC: retina follow up   HPI: Bonny Raymundo is a 74 year old with history of intraocular lens dislocation; diplopia and Epiretinal membrane   She still has mild diplopia that corrects with current glasses and prism. No changes in vision; no flashes and floaters     Ocular History: S/p dislocated intraocular lens right eye secondary intraocular lens right eye 2.2.15 Dr. Evert HEWITTIOL each eye, Dr. Snow 30-40 years ago  Lower lid blepharoplasty    Optical Coherence Tomography:11/04/21    right eye normal, tr erm  Left eye normal    CN 2-12 intact each eye     Impression   # Myopic fundus  Retina detachment precautions were discussed with the patient (presence or increased in flashes, floaters or a curtain in the visual field) and was asked to return if any of the those occur     # history of Binocular diplopia  Mildly symptomatic  - Small angle esotropia and hypertropia  could be due to Heavy Eye vs. Sagging Eye syndrome?  Same prescription than last yr  11/04/21   observe    # status post 23g Pars plana vitrectomy (PPV) removal  Of Dislocated right eye and secondary intraocular lens placement 2.2.15 right eye (SM) scleral fixated intraocular lens [id number] 16.5 D  Intraocular lens in good position  Retina attached, doing well, VA corrects to 20/20    # Cataract surgery (1980's) both eyes,   #History of Lower lid blepharoplasty  # Trace Epiretinal membrane right eye- not visually significant   observe    Plan:  Follow up with retina in 1 yr with optos and Optical Coherence Tomography     ~~~~~~~~~~~~~~~~~~~~~~~~~~~~~~~~~~   Complete documentation of historical and exam elements from today's encounter can be found in the full encounter summary report (not reduplicated in this progress note).  I personally obtained the chief complaint(s) and history of present illness.  I confirmed and edited as necessary the review of systems, past medical/surgical history, family history, social history, and examination findings as  documented by others; and I examined the patient myself.  I personally reviewed the relevant tests, images, and reports as documented above.  I formulated and edited as necessary the assessment and plan and discussed the findings and management plan with the patient and family    Racheal Loyd MD   of Ophthalmology.  Retina Service   Department of Ophthalmology and Visual Neurosciences   [facility]  Phone: [phone]   Fax: [phone]

## 2021-11-15 ENCOUNTER — PATIENT OUTREACH (OUTPATIENT)
Dept: ONCOLOGY | Facility: CLINIC | Age: 76
End: 2021-11-15
Payer: COMMERCIAL

## 2021-11-15 NOTE — PROGRESS NOTES
Oncology RN Care Coordination Note:     Incoming Call:  This patient left a voicemail stating she has been experiencing bleeding gums and she is wondering how to decrease the amount of Eliquis she is on.     This writer has sent a message to Dr. Rogel for this patient's request.     Karley Cunha RN BSN   [facility]  Nurse Coordinator

## 2021-11-19 NOTE — PROGRESS NOTES
Oncology RN Care Coordination Note:     Incoming Call:  This patient called to say she has reduced her eliquis because of gum bleeding.  States she has decreased to 1/2 tab 2x daily.      Dr. Rogel has been updated via in-basket message.  Awaiting a reply from him at this time regarding patient's bleeding.      Outgoing Call:  This writer called Bonny back to let her know I received her voicemail and I have updated Dr. Rogel, awaiting a response at this time.  Patient states bleeding has resolved since decreasing dosage.  Informed patient we would reach out when we hear from Dr. Rogel if she needs to change anything.     Karley Cunha, RN BSN   [facility]  Nurse Coordinator

## 2021-12-01 ENCOUNTER — LAB (OUTPATIENT)
Dept: INFUSION THERAPY | Facility: CLINIC | Age: 76
End: 2021-12-01
Attending: INTERNAL MEDICINE
Payer: COMMERCIAL

## 2021-12-01 DIAGNOSIS — D61.3 IDIOPATHIC APLASTIC ANEMIA (H): ICD-10-CM

## 2021-12-01 LAB
ALBUMIN SERPL-MCNC: 3 G/DL (ref 3.4–5)
ALP SERPL-CCNC: 96 U/L (ref 40–150)
ALT SERPL W P-5'-P-CCNC: 17 U/L (ref 0–50)
ANION GAP SERPL CALCULATED.3IONS-SCNC: 5 MMOL/L (ref 3–14)
AST SERPL W P-5'-P-CCNC: 19 U/L (ref 0–45)
BASOPHILS # BLD AUTO: 0 10E3/UL (ref 0–0.2)
BASOPHILS NFR BLD AUTO: 0 %
BILIRUB SERPL-MCNC: 0.5 MG/DL (ref 0.2–1.3)
BUN SERPL-MCNC: 26 MG/DL (ref 7–30)
CALCIUM SERPL-MCNC: 8.9 MG/DL (ref 8.5–10.1)
CHLORIDE BLD-SCNC: 108 MMOL/L (ref 94–109)
CO2 SERPL-SCNC: 26 MMOL/L (ref 20–32)
CREAT SERPL-MCNC: 1.4 MG/DL (ref 0.52–1.04)
ELLIPTOCYTES BLD QL SMEAR: SLIGHT
EOSINOPHIL # BLD AUTO: 0.1 10E3/UL (ref 0–0.7)
EOSINOPHIL NFR BLD AUTO: 4 %
ERYTHROCYTE [DISTWIDTH] IN BLOOD BY AUTOMATED COUNT: 18.1 % (ref 10–15)
GFR SERPL CREATININE-BSD FRML MDRD: 36 ML/MIN/1.73M2
GLUCOSE BLD-MCNC: 109 MG/DL (ref 70–99)
HCT VFR BLD AUTO: 29.3 % (ref 35–47)
HGB BLD-MCNC: 9 G/DL (ref 11.7–15.7)
IMM GRANULOCYTES # BLD: 0 10E3/UL
IMM GRANULOCYTES NFR BLD: 1 %
LYMPHOCYTES # BLD AUTO: 1 10E3/UL (ref 0.8–5.3)
LYMPHOCYTES NFR BLD AUTO: 55 %
MCH RBC QN AUTO: 33.8 PG (ref 26.5–33)
MCHC RBC AUTO-ENTMCNC: 30.7 G/DL (ref 31.5–36.5)
MCV RBC AUTO: 110 FL (ref 78–100)
MONOCYTES # BLD AUTO: 0.4 10E3/UL (ref 0–1.3)
MONOCYTES NFR BLD AUTO: 21 %
NEUTROPHILS # BLD AUTO: 0.3 10E3/UL (ref 1.6–8.3)
NEUTROPHILS NFR BLD AUTO: 19 %
NRBC # BLD AUTO: 0 10E3/UL
NRBC BLD AUTO-RTO: 2 /100
PLAT MORPH BLD: ABNORMAL
PLATELET # BLD AUTO: 126 10E3/UL (ref 150–450)
POTASSIUM BLD-SCNC: 3.9 MMOL/L (ref 3.4–5.3)
PROT SERPL-MCNC: 7.8 G/DL (ref 6.8–8.8)
RBC # BLD AUTO: 2.66 10E6/UL (ref 3.8–5.2)
RBC MORPH BLD: ABNORMAL
SODIUM SERPL-SCNC: 139 MMOL/L (ref 133–144)
URATE SERPL-MCNC: 4.4 MG/DL (ref 2.6–6)
WBC # BLD AUTO: 1.8 10E3/UL (ref 4–11)

## 2021-12-01 PROCEDURE — 84550 ASSAY OF BLOOD/URIC ACID: CPT | Performed by: INTERNAL MEDICINE

## 2021-12-01 PROCEDURE — 85025 COMPLETE CBC W/AUTO DIFF WBC: CPT | Performed by: INTERNAL MEDICINE

## 2021-12-01 PROCEDURE — 36415 COLL VENOUS BLD VENIPUNCTURE: CPT

## 2021-12-01 PROCEDURE — 80053 COMPREHEN METABOLIC PANEL: CPT | Performed by: INTERNAL MEDICINE

## 2021-12-01 NOTE — PROGRESS NOTES
Medical Assistant Note:  Bonny Raymundo presents today for blood draw.    Patient seen by provider today: No.   present during visit today: Not Applicable.    Concerns: No Concerns.    Procedure:  Lab draw site: lac, Needle type: bf, Gauge: 23.    Post Assessment:  Labs drawn without difficulty: Yes.    Discharge Plan:  Departure Mode: Ambulatory.    Face to Face Time: 5 min.    Bianca King, CMA

## 2021-12-07 ENCOUNTER — OFFICE VISIT (OUTPATIENT)
Dept: FAMILY MEDICINE | Facility: CLINIC | Age: 76
End: 2021-12-07
Payer: COMMERCIAL

## 2021-12-07 VITALS
BODY MASS INDEX: 21.09 KG/M2 | TEMPERATURE: 97.3 F | HEIGHT: 63 IN | HEART RATE: 89 BPM | DIASTOLIC BLOOD PRESSURE: 78 MMHG | WEIGHT: 119 LBS | OXYGEN SATURATION: 98 % | SYSTOLIC BLOOD PRESSURE: 124 MMHG

## 2021-12-07 DIAGNOSIS — Z23 NEED FOR PROPHYLACTIC VACCINATION AND INOCULATION AGAINST INFLUENZA: ICD-10-CM

## 2021-12-07 DIAGNOSIS — R51.9 NONINTRACTABLE HEADACHE, UNSPECIFIED CHRONICITY PATTERN, UNSPECIFIED HEADACHE TYPE: Primary | ICD-10-CM

## 2021-12-07 LAB — ERYTHROCYTE [SEDIMENTATION RATE] IN BLOOD BY WESTERGREN METHOD: 73 MM/HR (ref 0–30)

## 2021-12-07 PROCEDURE — G0008 ADMIN INFLUENZA VIRUS VAC: HCPCS | Performed by: PHYSICIAN ASSISTANT

## 2021-12-07 PROCEDURE — 85652 RBC SED RATE AUTOMATED: CPT | Performed by: PHYSICIAN ASSISTANT

## 2021-12-07 PROCEDURE — 90662 IIV NO PRSV INCREASED AG IM: CPT | Performed by: PHYSICIAN ASSISTANT

## 2021-12-07 PROCEDURE — 36415 COLL VENOUS BLD VENIPUNCTURE: CPT | Performed by: PHYSICIAN ASSISTANT

## 2021-12-07 PROCEDURE — 99213 OFFICE O/P EST LOW 20 MIN: CPT | Mod: 25 | Performed by: PHYSICIAN ASSISTANT

## 2021-12-07 ASSESSMENT — MIFFLIN-ST. JEOR: SCORE: 993.91

## 2021-12-07 ASSESSMENT — PAIN SCALES - GENERAL: PAINLEVEL: SEVERE PAIN (6)

## 2021-12-07 NOTE — PROGRESS NOTES
HPI:Bonny is a pleasant 76 yo female with aplastic anemia here with complaint of pain in her forehead starting about 2 weeks ago  Pain was intermittent but seems to be more freq now  Describes this as a sharp jab of pain she feels right under the skin on one particular spot R forehead.   Denies assoc skin lesions  Denies any blurred vision or double vision  Denies any loss of her vision  She has slight pain behind the R eye as well that is intermittent  Denies any pain in the temples  Denies assoc cold sxs ( no nasal congestion, sore throat or fever)    Pain does not go deeper, feels like the pain right in/under the skin  Not dizzy and otherwise feels well  She tried taking tylenol which helped initially but in the past 3 days the pain is worse.  If she doesn't move her head or touch that area it is not painful.  Denies neck pain or stiffness    Past Medical History:   Diagnosis Date     Allergic rhinitis due to other allergen      Aplastic anemia (H) 1/9/2017     Closed anterior dislocation of humerus      DVT of lower extremity (deep venous thrombosis) (H) 10-12    Left after prolonged sitting-plane     DVT, recurrent, lower extremity, acute (H) 2014     Headache(784.0)      Osteoporosis, unspecified      Pulmonary emboli (H) 10-12     Sensorineural hearing loss, unspecified      Sprain of ankle, unspecified site     L     Past Surgical History:   Procedure Laterality Date     ARTHRODESIS FOOT  7-18-11    Hallux valgus R-1st MP joint     BLEPHAROPLASTY BILATERAL  2012, 2014     BONE MARROW BIOPSY, BONE SPECIMEN, NEEDLE/TROCAR N/A 9/26/2016    Procedure: BIOPSY BONE MARROW;  Surgeon: Mu Morgan MD;  Location: [facility]     BONE MARROW BIOPSY, BONE SPECIMEN, NEEDLE/TROCAR N/A 11/21/2016    Procedure: BIOPSY BONE MARROW;  Surgeon: Mu Morgan MD;  Location:  GI     C DEXA INTERPRETATION, AXIAL  8-20-03     CATARACT IOL, RT/LT Right 1988     CATARACT IOL, RT/LT Left 1985     COLONOSCOPY N/A  "2018    Procedure: COLONOSCOPY;  colonoscopy;  Surgeon: Meliton Castrejon MD;  Location:  GI     EXCHANGE INTRAOCULAR LENS IMPLANT Right 2015    Procedure: EXCHANGE INTRAOCULAR LENS IMPLANT;  Surgeon: Garrett Dawson MD;  Location:  EC     PICC INSERTION Left 2017    5fr DL BioFlo PICC, 42cm (2cm external) in the L basilic vein w/ tip in the  SVC RA junction.     VITRECTOMY PARSPLANA WITH 23 GAUGE SYSTEM Right 2015    Procedure: VITRECTOMY PARSPLANA WITH 23 GAUGE SYSTEM;  Surgeon: Racheal Loyd MD;  Location:  EC     ZZC NONSPECIFIC PROCEDURE           ZZC NONSPECIFIC PROCEDURE      hysterectomy/BSO     ZZC NONSPECIFIC PROCEDURE      myomectomy (fibroids)     ZZHC COLONOSCOPY THRU STOMA, DIAGNOSTIC      normal- minimal diverticulosis     Social History     Tobacco Use     Smoking status: Former Smoker     Quit date: 1973     Years since quittin.6     Smokeless tobacco: Never Used   Substance Use Topics     Alcohol use: No     Alcohol/week: 0.0 standard drinks     Comment: occasionally     Current Outpatient Medications   Medication Sig Dispense Refill     allopurinol (ZYLOPRIM) 300 MG tablet TAKE 0.5 TABLETS (150 MG) BY MOUTH DAILY 15 tablet 5     Cyanocobalamin (VITAMIN B-12 PO) Take 1 tablet by mouth daily       diphenhydrAMINE (BENADRYL ALLERGY) 25 MG tablet Take 25 mg by mouth At Bedtime  56 tablet      ELIQUIS ANTICOAGULANT 5 MG tablet TAKE 1 TABLET BY MOUTH TWICE A  tablet 1     pantoprazole (PROTONIX) 20 MG EC tablet TAKE 1 TABLET BY MOUTH EVERY DAY 90 tablet 11     Allergies   Allergen Reactions     Cats      Dogs      Seasonal Allergies      FAMILY HISTORY NOTED AND REVIEWED    PHYSICAL EXAM:    /79 (BP Location: Left arm, Patient Position: Sitting, Cuff Size: Adult Regular)   Pulse 89   Temp 97.3  F (36.3  C) (Temporal)   Ht 1.6 m (5' 3\")   Wt 54 kg (119 lb)   SpO2 98%   BMI 21.08 kg/m      Patient appears non toxic  Skin " in the area of her tenderness R forehead appears normal without skin lesions.   No palp mass  No tenderness over temples  Neuro: normal speech and gait. Normal facial symmetry.     Assessment and Plan:     (R51.9) Nonintractable headache, unspecified chronicity pattern, unspecified headache type  (primary encounter diagnosis)  Comment: try icing this area of sensitivity. Doubt temporal arteritis, but will check ESR as her pain is atypical.  Plan: ESR: Erythrocyte sedimentation rate            (Z23) Need for prophylactic vaccination and inoculation against influenza  Comment:   Plan: INFLUENZA, QUAD, HIGH DOSE, PF, 65YR + (FLUZONE        HD), ADMIN INFLUENZA (For MEDICARE Patients         ONLY) []              Shivani Phelps PA-C

## 2021-12-08 ENCOUNTER — TELEPHONE (OUTPATIENT)
Dept: FAMILY MEDICINE | Facility: CLINIC | Age: 76
End: 2021-12-08
Payer: COMMERCIAL

## 2021-12-08 DIAGNOSIS — R51.9 FACIAL PAIN, ACUTE: Primary | ICD-10-CM

## 2021-12-08 NOTE — RESULT ENCOUNTER NOTE
Bonny,    Your blood test is elevated but I suspect that is due to you being anemic from having aplastic anemia.  Let me know if the icing helps the pain.    Shivani Phelps PA-C

## 2021-12-08 NOTE — TELEPHONE ENCOUNTER
Pt called     Was advised to try icing/ice packs - not helping     Headache is persistent     APAP does not seem to be helping either     Asking for next step(s) to addressing headache pain     Lana BALTAZAR, Triage RN  [facility]

## 2021-12-09 ENCOUNTER — HOSPITAL ENCOUNTER (EMERGENCY)
Facility: CLINIC | Age: 76
Discharge: HOME | End: 2021-12-09
Attending: EMERGENCY MEDICINE | Admitting: EMERGENCY MEDICINE
Payer: COMMERCIAL

## 2021-12-09 VITALS
DIASTOLIC BLOOD PRESSURE: 70 MMHG | RESPIRATION RATE: 16 BRPM | BODY MASS INDEX: 21.9 KG/M2 | HEIGHT: 62 IN | TEMPERATURE: 97.5 F | SYSTOLIC BLOOD PRESSURE: 135 MMHG | HEART RATE: 90 BPM | OXYGEN SATURATION: 100 % | WEIGHT: 119 LBS

## 2021-12-09 DIAGNOSIS — B02.9 HERPES ZOSTER WITHOUT COMPLICATION: ICD-10-CM

## 2021-12-09 PROCEDURE — 250N000013 HC RX MED GY IP 250 OP 250 PS 637: Performed by: EMERGENCY MEDICINE

## 2021-12-09 PROCEDURE — 99283 EMERGENCY DEPT VISIT LOW MDM: CPT

## 2021-12-09 PROCEDURE — 250N000009 HC RX 250: Performed by: EMERGENCY MEDICINE

## 2021-12-09 RX ORDER — VALACYCLOVIR HYDROCHLORIDE 1 G/1
1000 TABLET, FILM COATED ORAL DAILY
Qty: 7 TABLET | Refills: 0 | Status: SHIPPED | OUTPATIENT
Start: 2021-12-09 | End: 2022-02-09

## 2021-12-09 RX ORDER — HYDROCODONE BITARTRATE AND ACETAMINOPHEN 5; 325 MG/1; MG/1
1 TABLET ORAL EVERY 6 HOURS PRN
Qty: 10 TABLET | Refills: 0 | Status: SHIPPED | OUTPATIENT
Start: 2021-12-09 | End: 2021-12-12

## 2021-12-09 RX ORDER — VALACYCLOVIR HYDROCHLORIDE 1 G/1
1000 TABLET, FILM COATED ORAL ONCE
Status: DISCONTINUED | OUTPATIENT
Start: 2021-12-09 | End: 2021-12-09

## 2021-12-09 RX ORDER — VALACYCLOVIR HYDROCHLORIDE 1 G/1
1000 TABLET, FILM COATED ORAL ONCE
Status: COMPLETED | OUTPATIENT
Start: 2021-12-09 | End: 2021-12-09

## 2021-12-09 RX ORDER — VALACYCLOVIR HYDROCHLORIDE 1 G/1
1000 TABLET, FILM COATED ORAL 3 TIMES DAILY
Qty: 21 TABLET | Refills: 0 | Status: SHIPPED | OUTPATIENT
Start: 2021-12-09 | End: 2021-12-09

## 2021-12-09 RX ORDER — PROPARACAINE HYDROCHLORIDE 5 MG/ML
1 SOLUTION/ DROPS OPHTHALMIC ONCE
Status: COMPLETED | OUTPATIENT
Start: 2021-12-09 | End: 2021-12-09

## 2021-12-09 RX ADMIN — VALACYCLOVIR HYDROCHLORIDE 1000 MG: 1 TABLET, FILM COATED ORAL at 13:26

## 2021-12-09 RX ADMIN — FLUORESCEIN SODIUM 600 MCG: 0.6 STRIP OPHTHALMIC at 13:25

## 2021-12-09 RX ADMIN — PROPARACAINE HYDROCHLORIDE 1 DROP: 5 SOLUTION/ DROPS OPHTHALMIC at 13:25

## 2021-12-09 ASSESSMENT — VISUAL ACUITY
OS: 20/25
OD: 20/25
OS: 20/20
OD: 20/20

## 2021-12-09 ASSESSMENT — ENCOUNTER SYMPTOMS: EYE PAIN: 1

## 2021-12-09 ASSESSMENT — MIFFLIN-ST. JEOR: SCORE: 978.03

## 2021-12-09 NOTE — ED NOTES
Patient had many questions regarding events in her life and if she should cancel them. Explained to patient that shingles is contagious even at this stage. Advised her to see how she feels and how the rash appears on her event days.

## 2021-12-09 NOTE — TELEPHONE ENCOUNTER
Pt called back and was given Shivani Phelps's message and referral information.  Pt states she had a bad night and pain is worse.  If she is not able to get in with neurology will go to the ED for evaluation.    Adult Neurology Referral        Comment: Please be aware that coverage of these services is subject to the terms and limitations of your health insurance plan.  Call member services at your health plan with any benefit or coverage questions.   [facility] will call you to coordinate your care as prescribed by your provider. If you don't hear from a representative within 2 business days, please call [phone].     Josefina LUTZ RN  EP Triage

## 2021-12-09 NOTE — ED PROVIDER NOTES
"  History   Chief Complaint:  Headache       HPI   Bonny Raymundo is a 75 year old female with history of shingles who presents with a worsening pain on the upper right side of her forehead and scalp since Sunday (12/5). The patient states that she saw her PCP on Tuesday who told her she has shingles. The patient was instructed to ice her head but she states that this did not help, nor did Tylenol. She called her PCP again on Wednesday and was recommended to see a neurologist. She says she couldn't get in soon enough so she came to the ER. She reports new lesions on the top of her head. She denies any ocular pain, though does have new lesions to her upper R eyelid. She has no changes in vision.     Review of Systems   Eyes: Positive for pain.   All other systems reviewed and are negative.    Allergies:  Cats  Dogs  Seasonal Allergies    Medications:  Eliquis  Benadryl  Protonix    Past Medical History:     Aplastic anemia  DVT of lower extremity  Headache  Osteoporosis  Pulmonary emboli  Sensorineural hearing loss  Ankle sprain  Pancytopenia  Pseudophakia  CKD stage 4  COPD  Dyspnea    Shingles    Past Surgical History:    Cataract surgery  Myomectomy  Vitrectomy parsplana  Picc insertion   Bunion correction    Family History:    Mother: Musculoskeletal disorder  Father: Heart disease  Brother: A fib, Hypertension, Hyperlipidemia    Social History:  The patient presents alone. Former smoker.    Physical Exam     Patient Vitals for the past 24 hrs:   BP Temp Temp src Pulse Resp SpO2 Height Weight   12/09/21 1350 135/70 -- -- -- -- -- -- --   12/09/21 1054 138/70 97.5  F (36.4  C) Oral 90 16 100 % 1.575 m (5' 2\") 54 kg (119 lb)       Physical Exam  General/Appearance: appears stated age, well-groomed, appears comfortable  Eyes: EOMI, no scleral injection, no icterus, mild erythema to upper eyelid, VA:R 20/25 and L 20/20, neg fluorsceine uptake  ENT: MMM  Neck: supple, nl ROM, no stiffness  Cardiovascular: RRR, nl " S1S2, no m/r/g, 2+ pulses in all 4 extremities, cap refill <2sec  Respiratory: CTAB, good air movement throughout, no wheezes/rhonchi/rales, no increased WOB, no retractions  MSK: ANN, good tone, no bony abnormality  Skin: warm and well-perfused, no edema, no ecchymosis, nl turgor, erythematous papular lesions in C1 dermatomal distribution currently sparing her nose  Neuro: GCS 15, alert and oriented, no gross focal neuro deficits  Psych: interacts appropriately  Heme: no petechia, no purpura, no active bleeding    Emergency Department Course     Emergency Department Course:  Reviewed:  I reviewed nursing notes, vitals, past medical history and Care Everywhere    Assessments:  1157 I obtained history and examined the patient as noted above.     Interventions:  1325 Alcaine 0.5%, 1 drop in right eye  1325 Gul-Nilam, 600 mcg, right eye  1326 Valtrex, 1000 mg, PO    Disposition:  The patient was discharged to home.     Impression & Plan       Medical Decision Making:  This patient is a pleasant 75-year-old female who presents with classic shingles in the V1 dermatome.  There is no involvement of the nose.  Visual acuity was normal, she has no ocular complaints, and fluorescein stain was negative for any dendritic uptake.  At this point in time I think it is ocular sparing.  To try to prevent its progression I am to be starting her on valacyclovir.  Based on her creatinine clearance/GFR we are changing it from 3 times daily to once daily for 7 days.  She feels comfortable with discharge and supportive care over the next couple days.  I have asked her to follow-up with her PCP for any further guidance needed.  She feels comfortable with this plan and outpatient management.  Diagnosis:    ICD-10-CM    1. Herpes zoster without complication  B02.9        Discharge Medications:  Discharge Medication List as of 12/9/2021  1:32 PM      START taking these medications    Details   HYDROcodone-acetaminophen (NORCO) 5-325 MG tablet  Take 1 tablet by mouth every 6 hours as needed for moderate to severe pain, Disp-10 tablet, R-0, Local Print      valACYclovir (VALTREX) 1000 mg tablet Take 1 tablet (1,000 mg) by mouth daily for 7 days, Disp-7 tablet, R-0, Local Print             Scribe Disclosure:  Abbie BAKER, am serving as a scribe at 11:54 AM on 12/9/2021 to document services personally performed by Natalie Garber MD based on my observations and the provider's statements to me.      Natalie Garber MD  12/09/21 [id number]

## 2021-12-09 NOTE — TELEPHONE ENCOUNTER
PCP, patient went to the ED and is being treated for shingles.      Marah Georges RN  [facility]

## 2021-12-09 NOTE — ED TRIAGE NOTES
Patient complains of right forehead pain for 2 weeks. States pain became worse and persistent Sunday. Now has bumps to the area in question. Complains of pain behind her eye. Right upper eyelid in swollen.

## 2021-12-13 ENCOUNTER — PATIENT OUTREACH (OUTPATIENT)
Dept: FAMILY MEDICINE | Facility: CLINIC | Age: 76
End: 2021-12-13
Payer: COMMERCIAL

## 2021-12-13 DIAGNOSIS — D61.9 APLASTIC ANEMIA (H): ICD-10-CM

## 2021-12-13 DIAGNOSIS — N18.4 CKD (CHRONIC KIDNEY DISEASE) STAGE 4, GFR 15-29 ML/MIN (H): ICD-10-CM

## 2021-12-13 DIAGNOSIS — I82.412 ACUTE DEEP VEIN THROMBOSIS (DVT) OF FEMORAL VEIN OF LEFT LOWER EXTREMITY (H): ICD-10-CM

## 2021-12-13 NOTE — TELEPHONE ENCOUNTER
What type of discharge? Emergency Department  Risk of Hospital admission or ED visit: 70%  Is a TCM episode required? No  When should the patient follow up with PCP? within 30 days of discharge.    Yamila Andrade RN  [facility]

## 2021-12-14 NOTE — TELEPHONE ENCOUNTER
"Patient Contact    Attempt # 1    Was call answered?  Yes    ED/Discharge Protocol    \"Hi, my name is Lana Arambula RN, a registered nurse, and I am calling on behalf of Shivani Phelps's office at [place].  I am calling to follow up and see how things are going for you after your recent visit.\"    \"I see that you were in the (ER/UC/IP) on 12/9/21 .    How are you doing now that you are home?\" doing okay - starting to crust over - concern regarding right eye but vision is fine. Wanting to close eye, a little swollen - keeping a close eye on it     Is patient experiencing symptoms that may require a hospital visit?  No     Discharge Instructions    \"Let's review your discharge instructions.  What is/are the follow-up recommendations?  Pt. Response: follow up     \"Were you instructed to make a follow-up appointment?\"  Pt. Response: Yes.  Has appointment been made?   No.  \"Can I help you schedule that appointment?\" no pt states she sees another provider and doesn't want to follow up at this time       \"When you see the provider, I would recommend that you bring your discharge instructions with you.    Medications    \"How many new medications are you on since your hospitalization/ED visit?\"    0-1  \"How many of your current medicines changed (dose, timing, name, etc.) while you were in the hospital/ED visit?\"   0-1  \"Do you have questions about your medications?\"   No  \"Were you newly diagnosed with heart failure, COPD, diabetes or did you have a heart attack?\"   No  For patients on insulin: \"Did you start on insulin in the hospital or did you have your insulin dose changed?\"   No  Post Discharge Medication Reconciliation Status: discharge medications reconciled, continue medications without change.    Was MTM referral placed (*Make sure to put transitions as reason for referral)?   No    Call Summary    \"Do you have any questions or concerns about your condition or care plan at the moment?\"    No  Triage nurse advice " "given    Patient was in ER 3x in the past year (assess appropriateness of ER visits.)      \"If you have questions or things don't continue to improve, we encourage you contact us through the main clinic number,  ([phone]).  Even if the clinic is not open, triage nurses are available 24/7 to help you.     We would like you to know that our clinic has extended hours (provide information).  We also have urgent care (provide details on closest location and hours/contact info)\"      \"Thank you for your time and take care!\"    Lana BALTAZAR, Triage RN  [facility]       "

## 2021-12-15 RX ORDER — APIXABAN 5 MG/1
TABLET, FILM COATED ORAL
Qty: 180 TABLET | Refills: 1 | Status: SHIPPED | OUTPATIENT
Start: 2021-12-15 | End: 2022-07-20

## 2021-12-15 NOTE — CONFIDENTIAL NOTE
ELIQUIS ANTICOAGULANT 5 MG tablet  Last prescribing provider: Dr Rogel     Last clinic visit date: 11/4/21 Dr Rogel     Any missed appointments or no-shows since last clinic visit?: No     Recommendations for requested medication (if none, N/A): medication not mentioned on last clinic visit 11/4/21     Next clinic visit date: 1/6/22 Dr Rogel     Any other pertinent information (if none, N/A): N/A

## 2021-12-31 ENCOUNTER — NURSE TRIAGE (OUTPATIENT)
Dept: FAMILY MEDICINE | Facility: CLINIC | Age: 76
End: 2021-12-31

## 2021-12-31 ENCOUNTER — VIRTUAL VISIT (OUTPATIENT)
Dept: FAMILY MEDICINE | Facility: CLINIC | Age: 76
End: 2021-12-31
Payer: COMMERCIAL

## 2021-12-31 DIAGNOSIS — B02.9 HERPES ZOSTER WITHOUT COMPLICATION: Primary | ICD-10-CM

## 2021-12-31 PROCEDURE — 99442 PR PHYSICIAN TELEPHONE EVALUATION 11-20 MIN: CPT | Mod: 95 | Performed by: INTERNAL MEDICINE

## 2021-12-31 NOTE — TELEPHONE ENCOUNTER
"  Nurse Triage SBAR    Is this a 2nd Level Triage? YES, LICENSED PRACTITIONER REVIEW IS REQUIRED    Situation  : Patient calling due to 6-7/10 facial pain that woke her up last night. Patient was seen at ED on 12/9 for shingles of right forehead, scalp and eye. Had 7 day course of valacyclovir.   Patient states that the pain has been intermittent, but \"would not want another night like last night!\" Took Excedrin without relief.   Time of call pain is 3/10. Also reports some swelling, stinging and numbness or her right eye. States that at times she itches it and wonders if swelling isn't related to her itching it,     Background  : Aplastic anemia. Patient is asking if second round of acyclovir?    Assessment :  Post herpetic neuralgia   (See information below for more triage details.)    Recommendation : Routing to provider for recommendation on same day appointment for persistent pain and swelling of right eye.    Can we leave a detailed message on this number? YES  Phone number patient can be reached at: Cell number on file:    Telephone Information:   Mobile [phone]       Anahi Hidalgo RN  [facility] Triage        Protocol Recommended Disposition: Urgent office follow-up appointment   Reason for Disposition    Patient wants to be seen    Additional Information    Negative: Shock suspected (e.g., cold/pale/clammy skin, too weak to stand, low BP, rapid pulse)    Negative: Similar pain previously and it was from 'heart attack'    Negative: Similar pain previously and it was from 'angina' and not relieved by nitroglycerin    Negative: Sounds like a life-threatening emergency to the triager    Negative: Chest pain    Negative: Sinus pain and congestion    Negative: Headache or pain in upper forehead    Negative: Toothache is the main symptom    Negative: Followed a face injury    Negative: Difficulty breathing or unusual sweating (e.g., sweating without exertion)    Negative: Can't close the " "mouth fully (i.e., \"mouth is locked open\", patient will have difficulty talking)    Negative: Fever and localized red rash    Negative: Fever and area of face is swollen    Negative: Difficult to awaken or acting confused (e.g., disoriented, slurred speech)    Negative: Sounds like a life-threatening emergency to the triager    Negative: Localized rash and doesn't match the SYMPTOMS of shingles    Negative: Back pain and doesn't match the SYMPTOMS of shingles    Negative: Shingles Vaccine (Recombinant Zoster Vaccine; RZV; Shingrix), questions about    Negative: Shingles rash of face and eye pain or blurred vision    Negative: Shingles rash on the eyelid or tip of the nose    Negative: Shingles rash and spots start appearing other places on body    Negative: Patient sounds very sick or weak to the triager    Negative: Shingles rash (matches SYMPTOMS) and weak immune system (e.g., HIV positive,  cancer chemotherapy, chronic steroid treatment, splenectomy) and NOT taking antiviral medication    Negative: Shingles rash of face and facial weakness    Negative: Shingles rash of face or ear and earache or ringing in the ear    Negative: Fever > 100.4 F (38.0 C)    Negative: SEVERE pain (e.g., excruciating)    Negative: Shingles rash (matches SYMPTOMS) and onset within past 72 hours    Negative: Looks infected (spreading redness, pus) and no fever    Answer Assessment - Initial Assessment Questions  1. ONSET: \"When did the pain start?\" (e.g., minutes, hours, days)      Face pain since start of shingles on 12/9. Reports intermittent swelling of right eye. States that it itches and itching seems to increase the swelling.  2. ONSET: \"Does the pain come and go, or has it been constant since it started?\" (e.g., constant, intermittent, fleeting)      Intermittent since it started  3. SEVERITY: \"How bad is the pain?\"   (Scale 1-10; mild, moderate or severe)    - MILD (1-3): doesn't interfere with normal activities     - MODERATE " "(4-7): interferes with normal activities or awakens from sleep     - SEVERE (8-10): excruciating pain, unable to do any normal activities       Last night it was worse - 6-7/10. Woke the patient up. Excedrin didn't work. Reports as throbbing pain.   4. LOCATION: \"Where does it hurt?\"       Right forehead, scalp and right eye. Eye pain has been there as well since start of shingles.   5. RASH: \"Is there any redness, rash, or swelling of the face?\"      No new rash or redness.   6. FEVER: \"Do you have a fever?\" If so, ask: \"What is it, how was it measured, and when did it start?\"       No fever. Has had night sweats a couple of times since onset of shingles 12/9  7. OTHER SYMPTOMS: \"Do you have any other symptoms?\" (e.g., fever, toothache, nasal discharge, nasal congestion, clicking sensation in jaw joint)      no  8. PREGNANCY: \"Is there any chance you are pregnant?\" \"When was your last menstrual period?\"      NA    Protocols used: SHINGLES-A-OH, FACE PAIN-A-OH      "

## 2021-12-31 NOTE — PROGRESS NOTES
Bonny Raymundo is a 76 year old who is being evaluated via a billable telephone visit.      What phone number would you like to be contacted at? [phone]  How would you like to obtain your AVS? Mail a copy   Patient has shingles as noted and reviewed, seen here then emergency room and given valtrex, but just once daily for 7 days.      Bad night last night, now better.  Stinging pain, numbness and pain behind right eye.  No vision changes, vision is fine.  Pain is intermittent.  Some swelling on eye brow.  The eye is not red.    Rash crusted over now, no new lesions.  Rash is on scalp, forehead on right side, right upper cheek.    ASSESSMENT:  1. shingles, I doubt eye involvement but I did rec patient see her eye md this week.  If the vision changes or eye pain worsens to call ophtho right away.  For now to use tylenol prn, call if not effective.  No reason to repeat valtrex.    Flii Wen M.D.  13 minutes on the day of the encounter doing chart review, history and exam, documentation and further activities as noted above.

## 2021-12-31 NOTE — TELEPHONE ENCOUNTER
Called patient, scheduled telephone visit   DEC 31   2021 12:00 PM - Provider Visit  [facility] MD Torres Marcus RN  [facility]

## 2022-01-01 ENCOUNTER — TELEPHONE (OUTPATIENT)
Dept: GASTROENTEROLOGY | Facility: CLINIC | Age: 77
End: 2022-01-01

## 2022-01-01 ENCOUNTER — INFUSION THERAPY VISIT (OUTPATIENT)
Dept: INFUSION THERAPY | Facility: CLINIC | Age: 77
End: 2022-01-01
Attending: INTERNAL MEDICINE
Payer: COMMERCIAL

## 2022-01-01 ENCOUNTER — OFFICE VISIT (OUTPATIENT)
Dept: FAMILY MEDICINE | Facility: CLINIC | Age: 77
End: 2022-01-01
Payer: COMMERCIAL

## 2022-01-01 ENCOUNTER — TELEPHONE (OUTPATIENT)
Dept: FAMILY MEDICINE | Facility: CLINIC | Age: 77
End: 2022-01-01

## 2022-01-01 ENCOUNTER — PATIENT OUTREACH (OUTPATIENT)
Dept: ONCOLOGY | Facility: CLINIC | Age: 77
End: 2022-01-01

## 2022-01-01 ENCOUNTER — LAB (OUTPATIENT)
Dept: LAB | Facility: CLINIC | Age: 77
End: 2022-01-01
Payer: COMMERCIAL

## 2022-01-01 VITALS
TEMPERATURE: 96.6 F | DIASTOLIC BLOOD PRESSURE: 70 MMHG | RESPIRATION RATE: 20 BRPM | OXYGEN SATURATION: 99 % | WEIGHT: 95.3 LBS | HEART RATE: 88 BPM | SYSTOLIC BLOOD PRESSURE: 102 MMHG | BODY MASS INDEX: 17.54 KG/M2 | HEIGHT: 62 IN

## 2022-01-01 VITALS
HEART RATE: 66 BPM | OXYGEN SATURATION: 99 % | DIASTOLIC BLOOD PRESSURE: 72 MMHG | TEMPERATURE: 98 F | SYSTOLIC BLOOD PRESSURE: 118 MMHG | RESPIRATION RATE: 16 BRPM

## 2022-01-01 DIAGNOSIS — N18.4 CKD (CHRONIC KIDNEY DISEASE) STAGE 4, GFR 15-29 ML/MIN (H): ICD-10-CM

## 2022-01-01 DIAGNOSIS — I82.402 RECURRENT ACUTE DEEP VEIN THROMBOSIS (DVT) OF LEFT LOWER EXTREMITY (H): ICD-10-CM

## 2022-01-01 DIAGNOSIS — D61.818 PANCYTOPENIA (H): ICD-10-CM

## 2022-01-01 DIAGNOSIS — D46.22 REFRACTORY ANEMIA WITH EXCESS BLASTS-2 (H): ICD-10-CM

## 2022-01-01 DIAGNOSIS — D46.22 REFRACTORY ANEMIA WITH EXCESS BLASTS-2 (H): Primary | ICD-10-CM

## 2022-01-01 DIAGNOSIS — J44.9 CHRONIC OBSTRUCTIVE PULMONARY DISEASE, UNSPECIFIED COPD TYPE (H): ICD-10-CM

## 2022-01-01 DIAGNOSIS — D61.9 APLASTIC ANEMIA (H): Primary | ICD-10-CM

## 2022-01-01 DIAGNOSIS — R19.7 DIARRHEA, UNSPECIFIED TYPE: ICD-10-CM

## 2022-01-01 DIAGNOSIS — N18.4 ANEMIA OF CHRONIC RENAL FAILURE, STAGE 4 (SEVERE) (H): ICD-10-CM

## 2022-01-01 DIAGNOSIS — D46.22 RAEB-2 (REFRACTORY ANEMIA WITH EXCESS BLASTS-2) (H): Chronic | ICD-10-CM

## 2022-01-01 DIAGNOSIS — D63.1 ANEMIA OF CHRONIC RENAL FAILURE, STAGE 4 (SEVERE) (H): ICD-10-CM

## 2022-01-01 DIAGNOSIS — R19.7 DIARRHEA, UNSPECIFIED TYPE: Primary | ICD-10-CM

## 2022-01-01 DIAGNOSIS — Z01.818 PREOP GENERAL PHYSICAL EXAM: Primary | ICD-10-CM

## 2022-01-01 DIAGNOSIS — D61.9 APLASTIC ANEMIA (H): ICD-10-CM

## 2022-01-01 LAB
ABO/RH(D): NORMAL
ALBUMIN SERPL BCG-MCNC: 2.6 G/DL (ref 3.5–5.2)
ALP SERPL-CCNC: 143 U/L (ref 35–104)
ALT SERPL W P-5'-P-CCNC: 8 U/L (ref 10–35)
ANION GAP SERPL CALCULATED.3IONS-SCNC: 11 MMOL/L (ref 7–15)
ANTIBODY SCREEN: NEGATIVE
AST SERPL W P-5'-P-CCNC: 26 U/L (ref 10–35)
BASOPHILS # BLD MANUAL: 0 10E3/UL (ref 0–0.2)
BASOPHILS NFR BLD MANUAL: 0 %
BILIRUB SERPL-MCNC: 0.7 MG/DL
BUN SERPL-MCNC: 21.9 MG/DL (ref 8–23)
CALCIUM SERPL-MCNC: 8.8 MG/DL (ref 8.8–10.2)
CHLORIDE SERPL-SCNC: 102 MMOL/L (ref 98–107)
CREAT SERPL-MCNC: 1.15 MG/DL (ref 0.51–0.95)
CREAT UR-MCNC: 60.4 MG/DL
CULTURE HARVEST COMPLETE DATE: NORMAL
CULTURE HARVEST COMPLETE DATE: NORMAL
DEPRECATED HCO3 PLAS-SCNC: 24 MMOL/L (ref 22–29)
EOSINOPHIL # BLD MANUAL: 0 10E3/UL (ref 0–0.7)
EOSINOPHIL NFR BLD MANUAL: 0 %
GFR SERPL CREATININE-BSD FRML MDRD: 48 ML/MIN/1.73M2
GLUCOSE SERPL-MCNC: 106 MG/DL (ref 70–99)
INTERPRETATION: NORMAL
ISCN: NORMAL
LDH SERPL L TO P-CCNC: NORMAL U/L
LYMPHOCYTES # BLD MANUAL: 0.6 10E3/UL (ref 0.8–5.3)
LYMPHOCYTES NFR BLD MANUAL: 27 %
METHODS: NORMAL
MICROALBUMIN UR-MCNC: <12 MG/L
MICROALBUMIN/CREAT UR: NORMAL MG/G{CREAT}
MONOCYTES # BLD MANUAL: 0.4 10E3/UL (ref 0–1.3)
MONOCYTES NFR BLD MANUAL: 19 %
MYELOCYTES # BLD MANUAL: 0 10E3/UL
MYELOCYTES NFR BLD MANUAL: 1 %
NEUTROPHILS # BLD MANUAL: 1.2 10E3/UL (ref 1.6–8.3)
NEUTROPHILS NFR BLD MANUAL: 53 %
PATH REPORT.COMMENTS IMP SPEC: ABNORMAL
PATH REPORT.COMMENTS IMP SPEC: YES
PATH REPORT.FINAL DX SPEC: ABNORMAL
PATH REPORT.MICROSCOPIC SPEC OTHER STN: ABNORMAL
PATH REPORT.RELEVANT HX SPEC: ABNORMAL
PLAT MORPH BLD: ABNORMAL
POTASSIUM SERPL-SCNC: 4 MMOL/L (ref 3.4–5.3)
PROT SERPL-MCNC: 6.9 G/DL (ref 6.4–8.3)
PTH-INTACT SERPL-MCNC: 19 PG/ML (ref 15–65)
RBC MORPH BLD: ABNORMAL
SODIUM SERPL-SCNC: 137 MMOL/L (ref 136–145)
SPECIMEN EXPIRATION DATE: NORMAL
URATE SERPL-MCNC: 6.4 MG/DL (ref 2.4–5.7)

## 2022-01-01 PROCEDURE — 99215 OFFICE O/P EST HI 40 MIN: CPT | Performed by: PHYSICIAN ASSISTANT

## 2022-01-01 PROCEDURE — P9016 RBC LEUKOCYTES REDUCED: HCPCS | Performed by: INTERNAL MEDICINE

## 2022-01-01 PROCEDURE — 86901 BLOOD TYPING SEROLOGIC RH(D): CPT

## 2022-01-01 PROCEDURE — 36430 TRANSFUSION BLD/BLD COMPNT: CPT

## 2022-01-01 PROCEDURE — 84550 ASSAY OF BLOOD/URIC ACID: CPT

## 2022-01-01 PROCEDURE — 80053 COMPREHEN METABOLIC PANEL: CPT

## 2022-01-01 PROCEDURE — 36415 COLL VENOUS BLD VENIPUNCTURE: CPT

## 2022-01-01 PROCEDURE — 85007 BL SMEAR W/DIFF WBC COUNT: CPT

## 2022-01-01 PROCEDURE — 96372 THER/PROPH/DIAG INJ SC/IM: CPT | Performed by: INTERNAL MEDICINE

## 2022-01-01 PROCEDURE — 83970 ASSAY OF PARATHORMONE: CPT

## 2022-01-01 PROCEDURE — 86850 RBC ANTIBODY SCREEN: CPT

## 2022-01-01 PROCEDURE — 85027 COMPLETE CBC AUTOMATED: CPT

## 2022-01-01 PROCEDURE — 86900 BLOOD TYPING SEROLOGIC ABO: CPT

## 2022-01-01 PROCEDURE — 82570 ASSAY OF URINE CREATININE: CPT

## 2022-01-01 PROCEDURE — 82043 UR ALBUMIN QUANTITATIVE: CPT

## 2022-01-01 PROCEDURE — 250N000011 HC RX IP 250 OP 636: Mod: EC | Performed by: INTERNAL MEDICINE

## 2022-01-01 RX ORDER — EPINEPHRINE 1 MG/ML
0.3 INJECTION, SOLUTION INTRAMUSCULAR; SUBCUTANEOUS EVERY 5 MIN PRN
Status: CANCELLED | OUTPATIENT
Start: 2022-01-01

## 2022-01-01 RX ORDER — ALBUTEROL SULFATE 0.83 MG/ML
2.5 SOLUTION RESPIRATORY (INHALATION)
Status: CANCELLED | OUTPATIENT
Start: 2022-01-01

## 2022-01-01 RX ORDER — DIPHENHYDRAMINE HYDROCHLORIDE 50 MG/ML
50 INJECTION INTRAMUSCULAR; INTRAVENOUS
Status: CANCELLED
Start: 2022-01-01

## 2022-01-01 RX ORDER — METHYLPREDNISOLONE SODIUM SUCCINATE 125 MG/2ML
125 INJECTION, POWDER, LYOPHILIZED, FOR SOLUTION INTRAMUSCULAR; INTRAVENOUS
Status: CANCELLED
Start: 2022-01-01

## 2022-01-01 RX ORDER — MEPERIDINE HYDROCHLORIDE 25 MG/ML
25 INJECTION INTRAMUSCULAR; INTRAVENOUS; SUBCUTANEOUS EVERY 30 MIN PRN
Status: CANCELLED | OUTPATIENT
Start: 2022-01-01

## 2022-01-01 RX ORDER — ALBUTEROL SULFATE 90 UG/1
1-2 AEROSOL, METERED RESPIRATORY (INHALATION)
Status: CANCELLED
Start: 2022-01-01

## 2022-01-01 RX ORDER — BISACODYL 5 MG
TABLET, DELAYED RELEASE (ENTERIC COATED) ORAL
Qty: 4 TABLET | Refills: 0 | Status: SHIPPED | OUTPATIENT
Start: 2022-01-01 | End: 2023-01-01

## 2022-01-01 RX ADMIN — DARBEPOETIN ALFA 300 MCG: 300 INJECTION, SOLUTION INTRAVENOUS; SUBCUTANEOUS at 12:48

## 2022-01-01 ASSESSMENT — PAIN SCALES - GENERAL
PAINLEVEL: NO PAIN (0)
PAINLEVEL: NO PAIN (0)

## 2022-01-04 ENCOUNTER — LAB (OUTPATIENT)
Dept: INFUSION THERAPY | Facility: CLINIC | Age: 77
End: 2022-01-04
Attending: INTERNAL MEDICINE
Payer: COMMERCIAL

## 2022-01-04 DIAGNOSIS — D61.3 IDIOPATHIC APLASTIC ANEMIA (H): ICD-10-CM

## 2022-01-04 LAB
ALBUMIN SERPL-MCNC: 2.9 G/DL (ref 3.4–5)
ALP SERPL-CCNC: 92 U/L (ref 40–150)
ALT SERPL W P-5'-P-CCNC: 14 U/L (ref 0–50)
ANION GAP SERPL CALCULATED.3IONS-SCNC: 5 MMOL/L (ref 3–14)
AST SERPL W P-5'-P-CCNC: 13 U/L (ref 0–45)
BASOPHILS # BLD AUTO: 0 10E3/UL (ref 0–0.2)
BASOPHILS NFR BLD AUTO: 0 %
BILIRUB SERPL-MCNC: 0.6 MG/DL (ref 0.2–1.3)
BUN SERPL-MCNC: 20 MG/DL (ref 7–30)
CALCIUM SERPL-MCNC: 9.5 MG/DL (ref 8.5–10.1)
CHLORIDE BLD-SCNC: 105 MMOL/L (ref 94–109)
CO2 SERPL-SCNC: 26 MMOL/L (ref 20–32)
CREAT SERPL-MCNC: 1.21 MG/DL (ref 0.52–1.04)
ELLIPTOCYTES BLD QL SMEAR: SLIGHT
EOSINOPHIL # BLD AUTO: 0 10E3/UL (ref 0–0.7)
EOSINOPHIL NFR BLD AUTO: 2 %
ERYTHROCYTE [DISTWIDTH] IN BLOOD BY AUTOMATED COUNT: 18.8 % (ref 10–15)
GFR SERPL CREATININE-BSD FRML MDRD: 46 ML/MIN/1.73M2
GLUCOSE BLD-MCNC: 100 MG/DL (ref 70–99)
HCT VFR BLD AUTO: 27.3 % (ref 35–47)
HGB BLD-MCNC: 8.1 G/DL (ref 11.7–15.7)
IMM GRANULOCYTES # BLD: 0 10E3/UL
IMM GRANULOCYTES NFR BLD: 1 %
LYMPHOCYTES # BLD AUTO: 1 10E3/UL (ref 0.8–5.3)
LYMPHOCYTES NFR BLD AUTO: 44 %
MCH RBC QN AUTO: 33.1 PG (ref 26.5–33)
MCHC RBC AUTO-ENTMCNC: 29.7 G/DL (ref 31.5–36.5)
MCV RBC AUTO: 111 FL (ref 78–100)
MONOCYTES # BLD AUTO: 0.4 10E3/UL (ref 0–1.3)
MONOCYTES NFR BLD AUTO: 21 %
NEUTROPHILS # BLD AUTO: 0.7 10E3/UL (ref 1.6–8.3)
NEUTROPHILS NFR BLD AUTO: 32 %
NRBC # BLD AUTO: 0 10E3/UL
NRBC BLD AUTO-RTO: 1 /100
PLAT MORPH BLD: ABNORMAL
PLATELET # BLD AUTO: 142 10E3/UL (ref 150–450)
POTASSIUM BLD-SCNC: 4.2 MMOL/L (ref 3.4–5.3)
PROT SERPL-MCNC: 7.6 G/DL (ref 6.8–8.8)
RBC # BLD AUTO: 2.45 10E6/UL (ref 3.8–5.2)
RBC MORPH BLD: ABNORMAL
SODIUM SERPL-SCNC: 136 MMOL/L (ref 133–144)
TARGETS BLD QL SMEAR: SLIGHT
URATE SERPL-MCNC: 6.5 MG/DL (ref 2.6–6)
WBC # BLD AUTO: 2.1 10E3/UL (ref 4–11)

## 2022-01-04 PROCEDURE — 85004 AUTOMATED DIFF WBC COUNT: CPT | Performed by: INTERNAL MEDICINE

## 2022-01-04 PROCEDURE — 84550 ASSAY OF BLOOD/URIC ACID: CPT | Performed by: INTERNAL MEDICINE

## 2022-01-04 PROCEDURE — 80053 COMPREHEN METABOLIC PANEL: CPT | Performed by: INTERNAL MEDICINE

## 2022-01-04 PROCEDURE — 36415 COLL VENOUS BLD VENIPUNCTURE: CPT

## 2022-01-04 NOTE — PROGRESS NOTES
Medical Assistant Note:  Bonny Raymundo presents today for lab draw.    Patient seen by provider today: No.   present during visit today: Not Applicable.    Concerns: No Concerns.    Procedure:  Lab draw site: LAC, Needle type: Butterfly, Gauge: 23.    Post Assessment:  Labs drawn without difficulty: Yes.    Discharge Plan:  Departure Mode: Ambulatory.    Face to Face Time: 4 min.    Inez Cesar CMA

## 2022-01-21 ENCOUNTER — OFFICE VISIT (OUTPATIENT)
Dept: FAMILY MEDICINE | Facility: CLINIC | Age: 77
End: 2022-01-21
Payer: COMMERCIAL

## 2022-01-21 ENCOUNTER — NURSE TRIAGE (OUTPATIENT)
Dept: FAMILY MEDICINE | Facility: CLINIC | Age: 77
End: 2022-01-21
Payer: COMMERCIAL

## 2022-01-21 VITALS
DIASTOLIC BLOOD PRESSURE: 77 MMHG | HEIGHT: 62 IN | TEMPERATURE: 99.6 F | SYSTOLIC BLOOD PRESSURE: 127 MMHG | HEART RATE: 98 BPM | RESPIRATION RATE: 22 BRPM | OXYGEN SATURATION: 100 % | WEIGHT: 111 LBS | BODY MASS INDEX: 20.43 KG/M2

## 2022-01-21 DIAGNOSIS — D61.3 IDIOPATHIC APLASTIC ANEMIA (H): ICD-10-CM

## 2022-01-21 DIAGNOSIS — J44.9 CHRONIC OBSTRUCTIVE PULMONARY DISEASE, UNSPECIFIED COPD TYPE (H): ICD-10-CM

## 2022-01-21 DIAGNOSIS — D61.818 PANCYTOPENIA (H): ICD-10-CM

## 2022-01-21 DIAGNOSIS — N18.4 CKD (CHRONIC KIDNEY DISEASE) STAGE 4, GFR 15-29 ML/MIN (H): ICD-10-CM

## 2022-01-21 DIAGNOSIS — R19.7 DIARRHEA, UNSPECIFIED TYPE: Primary | ICD-10-CM

## 2022-01-21 LAB
ERYTHROCYTE [DISTWIDTH] IN BLOOD BY AUTOMATED COUNT: 19.8 % (ref 10–15)
HCT VFR BLD AUTO: 26.3 % (ref 35–47)
HGB BLD-MCNC: 8 G/DL (ref 11.7–15.7)
MCH RBC QN AUTO: 33.1 PG (ref 26.5–33)
MCHC RBC AUTO-ENTMCNC: 30.4 G/DL (ref 31.5–36.5)
MCV RBC AUTO: 109 FL (ref 78–100)
PLATELET # BLD AUTO: 121 10E3/UL (ref 150–450)
RBC # BLD AUTO: 2.42 10E6/UL (ref 3.8–5.2)
WBC # BLD AUTO: 2.6 10E3/UL (ref 4–11)

## 2022-01-21 PROCEDURE — 99213 OFFICE O/P EST LOW 20 MIN: CPT | Performed by: INTERNAL MEDICINE

## 2022-01-21 PROCEDURE — 85027 COMPLETE CBC AUTOMATED: CPT | Performed by: INTERNAL MEDICINE

## 2022-01-21 PROCEDURE — 80053 COMPREHEN METABOLIC PANEL: CPT | Performed by: INTERNAL MEDICINE

## 2022-01-21 PROCEDURE — 36415 COLL VENOUS BLD VENIPUNCTURE: CPT | Performed by: INTERNAL MEDICINE

## 2022-01-21 PROCEDURE — 99215 OFFICE O/P EST HI 40 MIN: CPT | Performed by: INTERNAL MEDICINE

## 2022-01-21 ASSESSMENT — MIFFLIN-ST. JEOR: SCORE: 936.74

## 2022-01-21 ASSESSMENT — PAIN SCALES - GENERAL: PAINLEVEL: NO PAIN (0)

## 2022-01-21 NOTE — PROGRESS NOTES
Assessment & Plan   Problem List Items Addressed This Visit        Respiratory    COPD (chronic obstructive pulmonary disease) (H)       Digestive    DIARRHEA - Primary    Relevant Orders    CBC with platelets (Completed)    Comprehensive metabolic panel (BMP + Alb, Alk Phos, ALT, AST, Total. Bili, TP) (Completed)    Enteric Bacteria and Virus Panel by BETTIE Stool (Completed)    Fecal Lactoferrin (Completed)    Ova and Parasite Exam Routine (Completed)       Immune    Pancytopenia (H)       Urinary    CKD (chronic kidney disease) stage 4, GFR 15-29 ml/min (H)       Hematologic    Anemia           Keep well-hydrated water with electrolytes, do some stool studies including enteric bacterial, O&P, as well as WBC.  She had not received any antibiotics recently.  Patient is not sick or toxic looking.  Vitals are stable slightly tachycardic.  Denies any other symptoms.  Start taking probiotic she started yesterday including Yogurt  Avoid sugary liquids food she has been drinking, she was advised to abstain.  Started on Imodium up to 3 times a day as needed for diarrhea is stool studies are negative.  Further recommendation pending labs and stool test.  Patient discharged from clinic in stable condition       See Patient Instructions    Return in about 1 week (around 1/28/2022), or if symptoms worsen or fail to improve, for As needed and if symptoms worsen.  Total time was 40 minutes, review of records, exam and recommendations  Ayad White MD  [facility] IVTO Draper is a 76 year old who presents for the following health issues   HPI   H/o AA    No abdominal pain, diarrhea x 3 weeks    Had shingles, received 7 days of shingles    No blood or mucus, very watery     No fever , ONCE WHILE GETS CHILLS, LOSING WEIGHT    Today had toast banana juice, and cracker,     Appetite is OK,       Tolerating fluids, wayter and electrolytes / smart water / fruit juice    Started probiotics  "yesterday      Review of Systems   Constitutional, HEENT, cardiovascular, pulmonary, gi and gu systems are negative, except as otherwise noted.      Objective    /77 (BP Location: Right arm, Patient Position: Chair, Cuff Size: Adult Regular)   Pulse 98   Temp 99.6  F (37.6  C) (Temporal)   Resp 22   Ht 1.575 m (5' 2\")   Wt 50.3 kg (111 lb)   SpO2 100%   BMI 20.30 kg/m    Body mass index is 20.3 kg/m .  Physical Exam   GENERAL: healthy, alert and no distress  EYES: Eyes grossly normal to inspection, PERRL and conjunctivae and sclerae normal  RESP: lungs clear to auscultation - no rales, rhonchi or wheezes  CV: regular rate and rhythm, normal S1 S2, no S3 or S4, no murmur, click or rub, no peripheral edema and peripheral pulses strong  ABDOMEN: soft, nontender, no hepatosplenomegaly, no masses and bowel sounds normal  SKIN: no suspicious lesions or rashes  NEURO: Normal strength and tone, mentation intact and speech normal  PSYCH: mentation appears normal, affect normal/bright    Lab on 01/04/2022   Component Date Value Ref Range Status     Sodium 01/04/2022 136  133 - 144 mmol/L Final     Potassium 01/04/2022 4.2  3.4 - 5.3 mmol/L Final     Chloride 01/04/2022 105  94 - 109 mmol/L Final     Carbon Dioxide (CO2) 01/04/2022 26  20 - 32 mmol/L Final     Anion Gap 01/04/2022 5  3 - 14 mmol/L Final     Urea Nitrogen 01/04/2022 20  7 - 30 mg/dL Final     Creatinine 01/04/2022 1.21* 0.52 - 1.04 mg/dL Final     Calcium 01/04/2022 9.5  8.5 - 10.1 mg/dL Final     Glucose 01/04/2022 100* 70 - 99 mg/dL Final     Alkaline Phosphatase 01/04/2022 92  40 - 150 U/L Final     AST 01/04/2022 13  0 - 45 U/L Final     ALT 01/04/2022 14  0 - 50 U/L Final     Protein Total 01/04/2022 7.6  6.8 - 8.8 g/dL Final     Albumin 01/04/2022 2.9* 3.4 - 5.0 g/dL Final     Bilirubin Total 01/04/2022 0.6  0.2 - 1.3 mg/dL Final     GFR Estimate 01/04/2022 46* >60 mL/min/1.73m2 Final    Effective December 21, 2021 eGFRcr in adults is " calculated using the 2021 CKD-EPI creatinine equation which includes age and gender (Mariah et al., [facility], DOI: 10.1056/ZDNUgt5506236)     WBC Count 01/04/2022 2.1* 4.0 - 11.0 10e3/uL Final     RBC Count 01/04/2022 2.45* 3.80 - 5.20 10e6/uL Final     Hemoglobin 01/04/2022 8.1* 11.7 - 15.7 g/dL Final     Hematocrit 01/04/2022 27.3* 35.0 - 47.0 % Final     MCV 01/04/2022 111* 78 - 100 fL Final     MCH 01/04/2022 33.1* 26.5 - 33.0 pg Final     MCHC 01/04/2022 29.7* 31.5 - 36.5 g/dL Final     RDW 01/04/2022 18.8* 10.0 - 15.0 % Final     Platelet Count 01/04/2022 142* 150 - 450 10e3/uL Final     % Neutrophils 01/04/2022 32  % Final     % Lymphocytes 01/04/2022 44  % Final     % Monocytes 01/04/2022 21  % Final     % Eosinophils 01/04/2022 2  % Final     % Basophils 01/04/2022 0  % Final     % Immature Granulocytes 01/04/2022 1  % Final     NRBCs per 100 WBC 01/04/2022 1* <1 /100 Final     Absolute Neutrophils 01/04/2022 0.7* 1.6 - 8.3 10e3/uL Final     Absolute Lymphocytes 01/04/2022 1.0  0.8 - 5.3 10e3/uL Final     Absolute Monocytes 01/04/2022 0.4  0.0 - 1.3 10e3/uL Final     Absolute Eosinophils 01/04/2022 0.0  0.0 - 0.7 10e3/uL Final     Absolute Basophils 01/04/2022 0.0  0.0 - 0.2 10e3/uL Final     Absolute Immature Granulocytes 01/04/2022 0.0  <=0.4 10e3/uL Final     Absolute NRBCs 01/04/2022 0.0  10e3/uL Final     Uric Acid 01/04/2022 6.5* 2.6 - 6.0 mg/dL Final     Platelet Assessment 01/04/2022 Automated Count Confirmed. Platelet morphology is normal.  Automated Count Confirmed. Platelet morphology is normal. Final     Elliptocytes 01/04/2022 Slight* None Seen Final     Target Cells 01/04/2022 Slight* None Seen Final     RBC Morphology 01/04/2022 Confirmed RBC Indices   Final

## 2022-01-21 NOTE — PATIENT INSTRUCTIONS
TAKE IMODIUM ONE TABLET UP TO 3 TIMES  A DAY AFTER EACH DIARRHEIC BOWEL MOVEMENT, FIRST TIME TAKE 2 TABLETS      BRING STOOL SAMPLE TO LAB

## 2022-01-21 NOTE — TELEPHONE ENCOUNTER
"S-(situation): Patient having diarrhea going on 3 weeks today.     B-(background): Patient has been taking Metamucil with no effect. No recent travel. No recent antibiotics.     A-(assessment): See below    R-(recommendations): Per Disposition, Patient needs to be seen today in office for moderate diarrhea present >2 days      1. DIARRHEA SEVERITY: \"How bad is the diarrhea?\" \"How many extra stools have you had in the past 24 hours than normal?\"     -  MODERATE (SCALE 4-7): Increase of 4-6 stools daily over normal; moderate increase in ostomy output.  2. ONSET: \"When did the diarrhea begin?\"       3 weeks ago  3. BM CONSISTENCY: \"How loose or watery is the diarrhea?\"       Watery  4. VOMITING: \"Are you also vomiting?\" If so, ask: \"How many times in the past 24 hours?\"       No vomiting but is feeling gaggy  5. ABDOMINAL PAIN: \"Are you having any abdominal pain?\" If yes: \"What does it feel like?\" (e.g., crampy, dull, intermittent, constant)       No pain  6. ABDOMINAL PAIN SEVERITY: If present, ask: \"How bad is the pain?\"  (e.g., Scale 1-10; mild, moderate, or severe)    - MILD (1-3): doesn't interfere with normal activities, abdomen soft and not tender to touch   7. ORAL INTAKE: If vomiting, \"Have you been able to drink liquids?\" \"How much fluids have you had in the past 24 hours?\"      16oz  8. HYDRATION: \"Any signs of dehydration?\" (e.g., dry mouth [not just dry lips], too weak to stand, dizziness, new weight loss) \"When did you last urinate?\"      Dry mouth, feeling weaker than usual, weight loss of 5 lbs  9. EXPOSURE: \"Have you traveled to a foreign country recently?\" \"Have you been exposed to anyone with diarrhea?\" \"Could you have eaten any food that was spoiled?\"      No  10. ANTIBIOTIC USE: \"Are you taking antibiotics now or have you taken antibiotics in the past 2 months?\"        No  11. OTHER SYMPTOMS: \"Do you have any other symptoms?\" (e.g., fever, blood in stool)        No    Reason for Disposition    " MODERATE diarrhea (e.g., 4-6 times / day more than normal) and present > 48 hours (2 days)    Additional Information    Negative: Shock suspected (e.g., cold/pale/clammy skin, too weak to stand, low BP, rapid pulse)    Negative: Difficult to awaken or acting confused (e.g., disoriented, slurred speech)    Negative: Sounds like a life-threatening emergency to the triager    Negative: Vomiting also present and worse than the diarrhea    Negative: Blood in stool and without diarrhea    Negative: SEVERE abdominal pain (e.g., excruciating) and present > 1 hour    Negative: SEVERE abdominal pain and age > 60    Negative: Bloody, black, or tarry bowel movements (Exception: chronic-unchanged black-grey bowel movements and is taking iron pills or Pepto-bismol)    Negative: SEVERE diarrhea (e.g., 7 or more times / day more than normal) and age > 60 years    Negative: Constant abdominal pain lasting > 2 hours    Negative: Drinking very little and has signs of dehydration (e.g., no urine > 12 hours, very dry mouth, very lightheaded)    Negative: Patient sounds very sick or weak to the triager    Negative: SEVERE diarrhea (e.g., 7 or more times / day more than normal) and present > 24 hours (1 day)    Protocols used: DIARRHEA-A-OH

## 2022-01-22 LAB
ALBUMIN SERPL-MCNC: 2.3 G/DL (ref 3.4–5)
ALP SERPL-CCNC: 80 U/L (ref 40–150)
ALT SERPL W P-5'-P-CCNC: 15 U/L (ref 0–50)
ANION GAP SERPL CALCULATED.3IONS-SCNC: 6 MMOL/L (ref 3–14)
AST SERPL W P-5'-P-CCNC: 28 U/L (ref 0–45)
BILIRUB SERPL-MCNC: 0.5 MG/DL (ref 0.2–1.3)
BUN SERPL-MCNC: 23 MG/DL (ref 7–30)
CALCIUM SERPL-MCNC: 8.2 MG/DL (ref 8.5–10.1)
CHLORIDE BLD-SCNC: 107 MMOL/L (ref 94–109)
CO2 SERPL-SCNC: 23 MMOL/L (ref 20–32)
CREAT SERPL-MCNC: 1.3 MG/DL (ref 0.52–1.04)
GFR SERPL CREATININE-BSD FRML MDRD: 42 ML/MIN/1.73M2
GLUCOSE BLD-MCNC: 99 MG/DL (ref 70–99)
POTASSIUM BLD-SCNC: 3.9 MMOL/L (ref 3.4–5.3)
PROT SERPL-MCNC: 7.2 G/DL (ref 6.8–8.8)
SODIUM SERPL-SCNC: 136 MMOL/L (ref 133–144)

## 2022-01-23 DIAGNOSIS — R19.7 DIARRHEA, UNSPECIFIED TYPE: ICD-10-CM

## 2022-01-23 NOTE — RESULT ENCOUNTER NOTE
Bonny, reviewed rest of your labs , chemistry shows normal electrolytes which is reassuring, kidney function test is stable from 4 months ago, please increase fluid intake, calcium level is low but it is due to low albumin, please increase protein in your diet such as in the form of Ensure Plus or boost 1 can every other day.  Liver enzymes are normal.  Please avoid anti-inflammatory medications these can be toxic to the kidney.Any further questions please let me knowDr. Christopher

## 2022-01-25 ENCOUNTER — TELEPHONE (OUTPATIENT)
Dept: FAMILY MEDICINE | Facility: CLINIC | Age: 77
End: 2022-01-25
Payer: COMMERCIAL

## 2022-01-25 NOTE — TELEPHONE ENCOUNTER
Patient Contact    Attempt # 1    Was call answered?  No.  Left message on voicemail with information to call back.    Lana BALTAZAR, Triage RN  [facility]

## 2022-01-25 NOTE — TELEPHONE ENCOUNTER
----- Message from Ayad White MD sent at 1/23/2022 11:49 AM CST -----  Bonny, reviewed rest of your labs , chemistry shows normal electrolytes which is reassuring, kidney function test is stable from 4 months ago, please increase fluid intake, calcium level is low but it is due to low albumin, please increase protein in your diet such as in the form of Ensure Plus or boost 1 can every other day.  Liver enzymes are normal.  Please avoid anti-inflammatory medications these can be toxic to the kidney.Any further questions please let me knowDrCaitlyn White

## 2022-01-26 PROCEDURE — 87209 SMEAR COMPLEX STAIN: CPT | Performed by: INTERNAL MEDICINE

## 2022-01-26 PROCEDURE — 87177 OVA AND PARASITES SMEARS: CPT | Performed by: INTERNAL MEDICINE

## 2022-01-26 PROCEDURE — 83630 LACTOFERRIN FECAL (QUAL): CPT | Performed by: INTERNAL MEDICINE

## 2022-01-26 PROCEDURE — 87506 IADNA-DNA/RNA PROBE TQ 6-11: CPT | Performed by: INTERNAL MEDICINE

## 2022-01-27 ENCOUNTER — APPOINTMENT (OUTPATIENT)
Dept: LAB | Facility: CLINIC | Age: 77
End: 2022-01-27
Payer: COMMERCIAL

## 2022-01-27 LAB — LACTOFERRIN STL QL IA: POSITIVE

## 2022-01-28 LAB
C COLI+JEJUNI+LARI FUSA STL QL NAA+PROBE: NOT DETECTED
EC STX1 GENE STL QL NAA+PROBE: NOT DETECTED
EC STX2 GENE STL QL NAA+PROBE: NOT DETECTED
NOROV GI+II ORF1-ORF2 JNC STL QL NAA+PR: NOT DETECTED
O+P STL MICRO: NEGATIVE
RVA NSP5 STL QL NAA+PROBE: NOT DETECTED
SALMONELLA SP RPOD STL QL NAA+PROBE: NOT DETECTED
SHIGELLA SP+EIEC IPAH STL QL NAA+PROBE: NOT DETECTED
V CHOL+PARA RFBL+TRKH+TNAA STL QL NAA+PR: NOT DETECTED
Y ENTERO RECN STL QL NAA+PROBE: NOT DETECTED

## 2022-01-28 NOTE — TELEPHONE ENCOUNTER
Patient Contact    Attempt # 2    Was call answered?  No.  Left message on voicemail with information to call triage back or to review lab result note in mychart.    Torres Montelongo RN  [facility]

## 2022-01-29 ENCOUNTER — TELEPHONE (OUTPATIENT)
Dept: NURSING | Facility: CLINIC | Age: 77
End: 2022-01-29
Payer: COMMERCIAL

## 2022-01-29 NOTE — TELEPHONE ENCOUNTER
"Patient calling for results of stool studies. Patient unable to get into her MyChart.     Bonny, stool studies shows no bacterial infection detected, there is positive fecal white blood cell which is inflammatory cells, suggestive of inflammation of bowels.  Please schedule follow-up virtual visit next week to discuss further your labs and if ongoing symptoms.  Please keep well-hydrated.  Dr. White    Patient reports that her whole body is sore and is seeking \"short term pain pill\". Informed patient that if she is requesting narcotic pain medication she will need to be seen and evaluated in person for that type of prescription.   Laurita Puente RN   01/29/22 10:16 AM  [facility] Nurse Advisor  "

## 2022-01-31 NOTE — TELEPHONE ENCOUNTER
Spoke to patient and she had read the notes in My Chart.     Marah Georges RN  -[facility]

## 2022-01-31 NOTE — ADDENDUM NOTE
Addended by: ANDREW OHARA on: 1/31/2022 10:08 AM     Modules accepted: Level of Service, SmartSet

## 2022-02-02 ENCOUNTER — TELEPHONE (OUTPATIENT)
Dept: FAMILY MEDICINE | Facility: CLINIC | Age: 77
End: 2022-02-02
Payer: COMMERCIAL

## 2022-02-02 NOTE — TELEPHONE ENCOUNTER
Left voice message asking pt to call triage back.      Ayad White MD  [facility] Triage  Bonny, stool studies shows no bacterial infection detected, there is positive fecal white blood cell which is inflammatory cells, suggestive of inflammation of bowels.   Please schedule follow-up virtual visit next week to discuss further your labs and if ongoing symptoms.  Please keep well-hydrated.   Dr. White   Written by Ayad White MD on 1/28/2022  9:15 PM CST

## 2022-02-02 NOTE — RESULT ENCOUNTER NOTE
Bonny, stool studies shows no bacterial infection detected, there is positive fecal white blood cell which is inflammatory cells, suggestive of inflammation of bowels.  Please schedule follow-up virtual visit next week to discuss further your labs and if ongoing symptoms.  Please keep well-hydrated.  Dr. White  Written by Ayad White MD on 1/28/2022  9:15 PM CST

## 2022-02-03 NOTE — TELEPHONE ENCOUNTER
Per chart review, pt has not reviewed Mychart message sent by PCP. Left voice message advising she call triage back.

## 2022-02-04 DIAGNOSIS — D69.8 OTHER SPECIFIED HEMORRHAGIC CONDITIONS (H): ICD-10-CM

## 2022-02-04 DIAGNOSIS — D61.3 IDIOPATHIC APLASTIC ANEMIA (H): Primary | ICD-10-CM

## 2022-02-09 ENCOUNTER — VIRTUAL VISIT (OUTPATIENT)
Dept: FAMILY MEDICINE | Facility: CLINIC | Age: 77
End: 2022-02-09
Payer: COMMERCIAL

## 2022-02-09 ENCOUNTER — TELEPHONE (OUTPATIENT)
Dept: TRANSPLANT | Facility: CLINIC | Age: 77
End: 2022-02-09

## 2022-02-09 DIAGNOSIS — R19.5 LOOSE STOOLS: ICD-10-CM

## 2022-02-09 DIAGNOSIS — B02.29 POST HERPETIC NEURALGIA: Primary | ICD-10-CM

## 2022-02-09 PROCEDURE — 99442 PR PHYSICIAN TELEPHONE EVALUATION 11-20 MIN: CPT | Mod: 95 | Performed by: PHYSICIAN ASSISTANT

## 2022-02-09 RX ORDER — GABAPENTIN 100 MG/1
100 CAPSULE ORAL 2 TIMES DAILY
Qty: 60 CAPSULE | Refills: 1 | Status: SHIPPED | OUTPATIENT
Start: 2022-02-09 | End: 2022-04-14

## 2022-02-09 NOTE — PROGRESS NOTES
Bonny is a 76 year old who is being evaluated via a billable telephone visit.      What phone number would you like to be contacted at? [phone]  How would you like to obtain your AVS? Angelito Waggoner   Bonny is a 76 year old who presents for the following health issues: diarrhea and shingrex    HPI       Chief Complaint   Patient presents with     Diarrhea     follow up     Diarrhea x one month; getting better.  Pt had diarrhea last week, then fine for days with formed stool until yesterday when had a loose stool.  Denies abd pain  She did lose weight with this but appetite has improved.  She did try Immodium when stool is loose which helps.  Her last colonoscopy was in 2018    She has pain in her forehead/scalp and when she blinks from her shingles  Objective           Vitals:  No vitals were obtained today due to virtual visit.    Physical Exam   healthy, alert and no distress  PSYCH: Alert and oriented times 3; coherent speech, normal   rate and volume, able to articulate logical thoughts, able   to abstract reason, no tangential thoughts, no hallucinations   or delusions  Her affect is normal  RESP: No cough, no audible wheezing, able to talk in full sentences  Remainder of exam unable to be completed due to telephone visits        Assessment and Plan:     (B02.29) Post herpetic neuralgia  (primary encounter diagnosis)  Comment:  Plan: gabapentin (NEURONTIN) 100 MG capsule bid            (R19.5) Loose stools  Comment:   Plan: recd observation for now since getting better. If recurs, recd she f/u with Dr. Castrejon as had pos fecal lactoferrin      Shivani Phelps PA-C            Phone call duration: 12 minutes

## 2022-02-09 NOTE — TELEPHONE ENCOUNTER
2/9 Third attempt to schedule Bonny for labs and a follow up with Dr. Rogel. Message sent to care team -angeline

## 2022-02-11 ENCOUNTER — TELEPHONE (OUTPATIENT)
Dept: TRANSPLANT | Facility: CLINIC | Age: 77
End: 2022-02-11
Payer: COMMERCIAL

## 2022-02-11 NOTE — TELEPHONE ENCOUNTER
Called patient;    Still Sore forehead and ocular region from Shingles in December; Outside MD gave her low dose gabapentin 100 mg BID    Also loose stools x 1 month; cultures neg (no C diff done) but now improving and not using or needing much imodium but has lost weight with the ongoing diarrhea.    She'll have CBCdp done in a week or so.  She had no other new questions.    CRISTOBAL Rogel

## 2022-03-03 ENCOUNTER — LAB (OUTPATIENT)
Dept: INFUSION THERAPY | Facility: CLINIC | Age: 77
End: 2022-03-03
Attending: INTERNAL MEDICINE
Payer: COMMERCIAL

## 2022-03-03 DIAGNOSIS — D69.8 OTHER SPECIFIED HEMORRHAGIC CONDITIONS (H): ICD-10-CM

## 2022-03-03 DIAGNOSIS — D61.3 IDIOPATHIC APLASTIC ANEMIA (H): ICD-10-CM

## 2022-03-03 LAB
ACANTHOCYTES BLD QL SMEAR: SLIGHT
ALBUMIN SERPL-MCNC: 2.5 G/DL (ref 3.4–5)
ALP SERPL-CCNC: 80 U/L (ref 40–150)
ALT SERPL W P-5'-P-CCNC: 15 U/L (ref 0–50)
ANION GAP SERPL CALCULATED.3IONS-SCNC: 4 MMOL/L (ref 3–14)
AST SERPL W P-5'-P-CCNC: 14 U/L (ref 0–45)
BASOPHILS # BLD MANUAL: 0 10E3/UL (ref 0–0.2)
BASOPHILS NFR BLD MANUAL: 0 %
BILIRUB SERPL-MCNC: 0.5 MG/DL (ref 0.2–1.3)
BUN SERPL-MCNC: 24 MG/DL (ref 7–30)
CALCIUM SERPL-MCNC: 8.7 MG/DL (ref 8.5–10.1)
CHLORIDE BLD-SCNC: 108 MMOL/L (ref 94–109)
CO2 SERPL-SCNC: 27 MMOL/L (ref 20–32)
CREAT SERPL-MCNC: 1.14 MG/DL (ref 0.52–1.04)
EOSINOPHIL # BLD MANUAL: 0 10E3/UL (ref 0–0.7)
EOSINOPHIL NFR BLD MANUAL: 0 %
ERYTHROCYTE [DISTWIDTH] IN BLOOD BY AUTOMATED COUNT: 21.8 % (ref 10–15)
GFR SERPL CREATININE-BSD FRML MDRD: 49 ML/MIN/1.73M2
GLUCOSE BLD-MCNC: 108 MG/DL (ref 70–99)
HCT VFR BLD AUTO: 26.3 % (ref 35–47)
HGB BLD-MCNC: 7.7 G/DL (ref 11.7–15.7)
INR PPP: 1.81 (ref 0.85–1.15)
LYMPHOCYTES # BLD MANUAL: 0.8 10E3/UL (ref 0.8–5.3)
LYMPHOCYTES NFR BLD MANUAL: 38 %
MAGNESIUM SERPL-MCNC: 1.7 MG/DL (ref 1.6–2.3)
MCH RBC QN AUTO: 33 PG (ref 26.5–33)
MCHC RBC AUTO-ENTMCNC: 29.3 G/DL (ref 31.5–36.5)
MCV RBC AUTO: 113 FL (ref 78–100)
MONOCYTES # BLD MANUAL: 0.3 10E3/UL (ref 0–1.3)
MONOCYTES NFR BLD MANUAL: 13 %
NEUTROPHILS # BLD MANUAL: 1 10E3/UL (ref 1.6–8.3)
NEUTROPHILS NFR BLD MANUAL: 49 %
NRBC # BLD AUTO: 0.1 10E3/UL
NRBC BLD MANUAL-RTO: 3 %
PLAT MORPH BLD: ABNORMAL
PLATELET # BLD AUTO: 180 10E3/UL (ref 150–450)
POTASSIUM BLD-SCNC: 4.5 MMOL/L (ref 3.4–5.3)
PROT SERPL-MCNC: 7 G/DL (ref 6.8–8.8)
RBC # BLD AUTO: 2.33 10E6/UL (ref 3.8–5.2)
RBC MORPH BLD: ABNORMAL
SODIUM SERPL-SCNC: 139 MMOL/L (ref 133–144)
TARGETS BLD QL SMEAR: SLIGHT
WBC # BLD AUTO: 2.1 10E3/UL (ref 4–11)

## 2022-03-03 PROCEDURE — 85027 COMPLETE CBC AUTOMATED: CPT | Performed by: INTERNAL MEDICINE

## 2022-03-03 PROCEDURE — 85610 PROTHROMBIN TIME: CPT | Performed by: INTERNAL MEDICINE

## 2022-03-03 PROCEDURE — 80053 COMPREHEN METABOLIC PANEL: CPT | Performed by: INTERNAL MEDICINE

## 2022-03-03 PROCEDURE — 36415 COLL VENOUS BLD VENIPUNCTURE: CPT

## 2022-03-03 PROCEDURE — 83735 ASSAY OF MAGNESIUM: CPT | Performed by: INTERNAL MEDICINE

## 2022-03-03 PROCEDURE — 82040 ASSAY OF SERUM ALBUMIN: CPT | Performed by: INTERNAL MEDICINE

## 2022-03-28 DIAGNOSIS — D61.3 IDIOPATHIC APLASTIC ANEMIA (H): Primary | ICD-10-CM

## 2022-03-28 DIAGNOSIS — N18.4 CKD (CHRONIC KIDNEY DISEASE) STAGE 4, GFR 15-29 ML/MIN (H): ICD-10-CM

## 2022-04-13 DIAGNOSIS — N18.4 CKD (CHRONIC KIDNEY DISEASE) STAGE 4, GFR 15-29 ML/MIN (H): Primary | ICD-10-CM

## 2022-04-13 DIAGNOSIS — D61.3 IDIOPATHIC APLASTIC ANEMIA (H): ICD-10-CM

## 2022-04-14 ENCOUNTER — OFFICE VISIT (OUTPATIENT)
Dept: TRANSPLANT | Facility: CLINIC | Age: 77
End: 2022-04-14
Attending: INTERNAL MEDICINE
Payer: COMMERCIAL

## 2022-04-14 ENCOUNTER — INFUSION THERAPY VISIT (OUTPATIENT)
Dept: ONCOLOGY | Facility: CLINIC | Age: 77
End: 2022-04-14
Attending: INTERNAL MEDICINE
Payer: COMMERCIAL

## 2022-04-14 ENCOUNTER — APPOINTMENT (OUTPATIENT)
Dept: LAB | Facility: CLINIC | Age: 77
End: 2022-04-14
Attending: INTERNAL MEDICINE
Payer: COMMERCIAL

## 2022-04-14 VITALS
BODY MASS INDEX: 19.37 KG/M2 | TEMPERATURE: 98 F | WEIGHT: 105.9 LBS | OXYGEN SATURATION: 98 % | SYSTOLIC BLOOD PRESSURE: 108 MMHG | DIASTOLIC BLOOD PRESSURE: 64 MMHG | RESPIRATION RATE: 18 BRPM | HEART RATE: 102 BPM

## 2022-04-14 DIAGNOSIS — D61.3 IDIOPATHIC APLASTIC ANEMIA (H): Primary | ICD-10-CM

## 2022-04-14 DIAGNOSIS — N18.4 CKD (CHRONIC KIDNEY DISEASE) STAGE 4, GFR 15-29 ML/MIN (H): Primary | ICD-10-CM

## 2022-04-14 DIAGNOSIS — N18.4 CKD (CHRONIC KIDNEY DISEASE) STAGE 4, GFR 15-29 ML/MIN (H): ICD-10-CM

## 2022-04-14 DIAGNOSIS — D61.3 IDIOPATHIC APLASTIC ANEMIA (H): ICD-10-CM

## 2022-04-14 LAB
ALBUMIN SERPL-MCNC: 2.6 G/DL (ref 3.4–5)
ALP SERPL-CCNC: 90 U/L (ref 40–150)
ALT SERPL W P-5'-P-CCNC: 16 U/L (ref 0–50)
ANION GAP SERPL CALCULATED.3IONS-SCNC: 7 MMOL/L (ref 3–14)
AST SERPL W P-5'-P-CCNC: 16 U/L (ref 0–45)
BASOPHILS # BLD MANUAL: 0 10E3/UL (ref 0–0.2)
BASOPHILS NFR BLD MANUAL: 0 %
BILIRUB SERPL-MCNC: 0.4 MG/DL (ref 0.2–1.3)
BUN SERPL-MCNC: 24 MG/DL (ref 7–30)
BURR CELLS BLD QL SMEAR: SLIGHT
CALCIUM SERPL-MCNC: 8.6 MG/DL (ref 8.5–10.1)
CHLORIDE BLD-SCNC: 108 MMOL/L (ref 94–109)
CO2 SERPL-SCNC: 25 MMOL/L (ref 20–32)
CREAT SERPL-MCNC: 1.17 MG/DL (ref 0.52–1.04)
ELLIPTOCYTES BLD QL SMEAR: SLIGHT
EOSINOPHIL # BLD MANUAL: 0 10E3/UL (ref 0–0.7)
EOSINOPHIL NFR BLD MANUAL: 0 %
ERYTHROCYTE [DISTWIDTH] IN BLOOD BY AUTOMATED COUNT: 19.1 % (ref 10–15)
FERRITIN SERPL-MCNC: 107 NG/ML (ref 8–252)
FOLATE SERPL-MCNC: 45 NG/ML
GFR SERPL CREATININE-BSD FRML MDRD: 48 ML/MIN/1.73M2
GLUCOSE BLD-MCNC: 127 MG/DL (ref 70–99)
HCT VFR BLD AUTO: 27.6 % (ref 35–47)
HGB BLD-MCNC: 8.3 G/DL (ref 11.7–15.7)
INR PPP: 1.73 (ref 0.85–1.15)
IRON SATN MFR SERPL: 30 % (ref 15–46)
IRON SERPL-MCNC: 52 UG/DL (ref 35–180)
LYMPHOCYTES # BLD MANUAL: 1 10E3/UL (ref 0.8–5.3)
LYMPHOCYTES NFR BLD MANUAL: 44 %
MCH RBC QN AUTO: 32.4 PG (ref 26.5–33)
MCHC RBC AUTO-ENTMCNC: 30.1 G/DL (ref 31.5–36.5)
MCV RBC AUTO: 108 FL (ref 78–100)
MONOCYTES # BLD MANUAL: 0.4 10E3/UL (ref 0–1.3)
MONOCYTES NFR BLD MANUAL: 19 %
NEUTROPHILS # BLD MANUAL: 0.8 10E3/UL (ref 1.6–8.3)
NEUTROPHILS NFR BLD MANUAL: 37 %
NRBC # BLD AUTO: 0 10E3/UL
NRBC BLD MANUAL-RTO: 1 %
PLAT MORPH BLD: ABNORMAL
PLATELET # BLD AUTO: 112 10E3/UL (ref 150–450)
POTASSIUM BLD-SCNC: 3.8 MMOL/L (ref 3.4–5.3)
PROT SERPL-MCNC: 7.5 G/DL (ref 6.8–8.8)
RBC # BLD AUTO: 2.56 10E6/UL (ref 3.8–5.2)
RBC MORPH BLD: ABNORMAL
RETICS # AUTO: 0.07 10E6/UL (ref 0.03–0.1)
RETICS/RBC NFR AUTO: 2.9 % (ref 0.5–2)
SODIUM SERPL-SCNC: 140 MMOL/L (ref 133–144)
TARGETS BLD QL SMEAR: SLIGHT
TIBC SERPL-MCNC: 174 UG/DL (ref 240–430)
VIT B12 SERPL-MCNC: 2462 PG/ML (ref 193–986)
WBC # BLD AUTO: 2.2 10E3/UL (ref 4–11)

## 2022-04-14 PROCEDURE — 83550 IRON BINDING TEST: CPT | Performed by: INTERNAL MEDICINE

## 2022-04-14 PROCEDURE — G0463 HOSPITAL OUTPT CLINIC VISIT: HCPCS

## 2022-04-14 PROCEDURE — 99214 OFFICE O/P EST MOD 30 MIN: CPT | Performed by: INTERNAL MEDICINE

## 2022-04-14 PROCEDURE — 258N000003 HC RX IP 258 OP 636: Performed by: INTERNAL MEDICINE

## 2022-04-14 PROCEDURE — 85014 HEMATOCRIT: CPT | Performed by: INTERNAL MEDICINE

## 2022-04-14 PROCEDURE — 96366 THER/PROPH/DIAG IV INF ADDON: CPT

## 2022-04-14 PROCEDURE — 96365 THER/PROPH/DIAG IV INF INIT: CPT

## 2022-04-14 PROCEDURE — 85045 AUTOMATED RETICULOCYTE COUNT: CPT | Performed by: INTERNAL MEDICINE

## 2022-04-14 PROCEDURE — 85610 PROTHROMBIN TIME: CPT | Performed by: INTERNAL MEDICINE

## 2022-04-14 PROCEDURE — 82728 ASSAY OF FERRITIN: CPT | Performed by: INTERNAL MEDICINE

## 2022-04-14 PROCEDURE — 82607 VITAMIN B-12: CPT | Performed by: INTERNAL MEDICINE

## 2022-04-14 PROCEDURE — 82746 ASSAY OF FOLIC ACID SERUM: CPT | Performed by: INTERNAL MEDICINE

## 2022-04-14 PROCEDURE — 80053 COMPREHEN METABOLIC PANEL: CPT | Performed by: INTERNAL MEDICINE

## 2022-04-14 PROCEDURE — 36415 COLL VENOUS BLD VENIPUNCTURE: CPT | Performed by: INTERNAL MEDICINE

## 2022-04-14 PROCEDURE — 250N000011 HC RX IP 250 OP 636: Performed by: INTERNAL MEDICINE

## 2022-04-14 RX ORDER — MULTIPLE VITAMINS W/ MINERALS TAB 9MG-400MCG
1 TAB ORAL DAILY
COMMUNITY

## 2022-04-14 RX ORDER — ALBUTEROL SULFATE 90 UG/1
1-2 AEROSOL, METERED RESPIRATORY (INHALATION)
Status: CANCELLED
Start: 2022-04-16

## 2022-04-14 RX ORDER — HEPARIN SODIUM (PORCINE) LOCK FLUSH IV SOLN 100 UNIT/ML 100 UNIT/ML
5 SOLUTION INTRAVENOUS
Status: CANCELLED | OUTPATIENT
Start: 2022-04-16

## 2022-04-14 RX ORDER — DIPHENHYDRAMINE HYDROCHLORIDE 50 MG/ML
50 INJECTION INTRAMUSCULAR; INTRAVENOUS
Status: CANCELLED
Start: 2022-04-16

## 2022-04-14 RX ORDER — EPINEPHRINE 1 MG/ML
0.3 INJECTION, SOLUTION INTRAMUSCULAR; SUBCUTANEOUS EVERY 5 MIN PRN
Status: CANCELLED | OUTPATIENT
Start: 2022-04-16

## 2022-04-14 RX ORDER — NALOXONE HYDROCHLORIDE 0.4 MG/ML
0.2 INJECTION, SOLUTION INTRAMUSCULAR; INTRAVENOUS; SUBCUTANEOUS
Status: CANCELLED | OUTPATIENT
Start: 2022-04-16

## 2022-04-14 RX ORDER — METHYLPREDNISOLONE SODIUM SUCCINATE 125 MG/2ML
125 INJECTION, POWDER, LYOPHILIZED, FOR SOLUTION INTRAMUSCULAR; INTRAVENOUS
Status: CANCELLED
Start: 2022-04-16

## 2022-04-14 RX ORDER — HEPARIN SODIUM,PORCINE 10 UNIT/ML
5 VIAL (ML) INTRAVENOUS
Status: CANCELLED | OUTPATIENT
Start: 2022-04-16

## 2022-04-14 RX ORDER — MEPERIDINE HYDROCHLORIDE 25 MG/ML
25 INJECTION INTRAMUSCULAR; INTRAVENOUS; SUBCUTANEOUS EVERY 30 MIN PRN
Status: CANCELLED | OUTPATIENT
Start: 2022-04-16

## 2022-04-14 RX ORDER — ALBUTEROL SULFATE 0.83 MG/ML
2.5 SOLUTION RESPIRATORY (INHALATION)
Status: CANCELLED | OUTPATIENT
Start: 2022-04-16

## 2022-04-14 RX ADMIN — SODIUM CHLORIDE 250 ML: 9 INJECTION, SOLUTION INTRAVENOUS at 16:13

## 2022-04-14 RX ADMIN — IRON SUCROSE 300 MG: 20 INJECTION, SOLUTION INTRAVENOUS at 16:14

## 2022-04-14 ASSESSMENT — PAIN SCALES - GENERAL: PAINLEVEL: NO PAIN (0)

## 2022-04-14 NOTE — LETTER
4/14/2022         RE: Bonny Raymundo  [address]  [facility] [facility] [address]        Dear Colleague,    Thank you for referring your patient, Bonny Raymundo, to the [facility] [place]. Please see a copy of my visit note below.    /64 (BP Location: Right arm, Patient Position: Sitting, Cuff Size: Adult Regular)   Pulse 102   Temp 98  F (36.7  C) (Oral)   Resp 18   Wt 48 kg (105 lb 14.4 oz)   SpO2 98%   BMI 19.37 kg/m    Wt Readings from Last 4 Encounters:   04/14/22 48 kg (105 lb 14.4 oz)   01/21/22 50.3 kg (111 lb)   12/09/21 54 kg (119 lb)   12/07/21 54 kg (119 lb)     Bonny returns to follow-up her aplastic anemia.  She has been feeling well with no recent fever chills rash or bleeding.  She has some residual itching and stinging from her shingles on her right forehead near her eye from some months ago but there are no visible cutaneous lesions anymore and her vision she thinks is acceptable.  She has a small some conjunctival hemorrhage in the left eye but that is asymptomatic.  She has had no GI  or other new symptoms and generally feels well.    Her exam shows no rash, petechaie or edema; No bone tenderness or enlarged nodes.  Oral mucosa negative s petech.   Lungs clear and cor reg.  No abd tenderness or distension  No R forehead lesions where she is itchy.    Laboratory studies show stable mild pancytopenia.  Because she was iron depleted (though not severely iron deficient) last year and only received 1 infusion of iron sucrose, will give her a second infusion today to ensure that iron depletion is not in any way contributing to her persisting anemia.  It is notable that she has macrocytic red cells but they are hypochromic by indices. B12 has been replaced orally with good recovery of B 12 levels.      I made no other change in her ongoing therapy and she will continue her anticoagulation as before but will still watch her blood counts  carefully every 6 to 8 weeks at a minimum.    Rafita Rogel MD    Professor of medicine     Latest Reference Range & Units 03/03/22 11:06 04/14/22 14:53   Sodium 133 - 144 mmol/L 139 140   Potassium 3.4 - 5.3 mmol/L 4.5 3.8   Chloride 94 - 109 mmol/L 108 108   Carbon Dioxide 20 - 32 mmol/L 27 25   Urea Nitrogen 7 - 30 mg/dL 24 24   Creatinine 0.52 - 1.04 mg/dL 1.14 (H) 1.17 (H)   GFR Estimate >60 mL/min/1.73m2 49 (L) [1] 48 (L) [2]   Calcium 8.5 - 10.1 mg/dL 8.7 8.6   Anion Gap 3 - 14 mmol/L 4 7   Magnesium 1.6 - 2.3 mg/dL 1.7    Albumin 3.4 - 5.0 g/dL 2.5 (L) 2.6 (L)   Protein Total 6.8 - 8.8 g/dL 7.0 7.5   Bilirubin Total 0.2 - 1.3 mg/dL 0.5 0.4   Alkaline Phosphatase 40 - 150 U/L 80 90   ALT 0 - 50 U/L 15 16   AST 0 - 45 U/L 14 16   Ferritin 8 - 252 ng/mL  107   Iron 35 - 180 ug/dL  52   Iron Binding Cap 240 - 430 ug/dL  174 (L)   Iron Saturation Index 15 - 46 %  30   Vitamin B12 193 - 986 pg/mL  2,462 (H)   Glucose 70 - 99 mg/dL 108 (H) 127 (H)   WBC 4.0 - 11.0 10e3/uL 2.1 (L) 2.2 (L)   Hemoglobin 11.7 - 15.7 g/dL 7.7 (L) 8.3 (L)   Hematocrit 35.0 - 47.0 % 26.3 (L) 27.6 (L)   Platelet Count 150 - 450 10e3/uL 180 112 (L)   RBC Count 3.80 - 5.20 10e6/uL 2.33 (L) 2.56 (L)   MCV 78 - 100 fL 113 (H) 108 (H)   MCH 26.5 - 33.0 pg 33.0 32.4   MCHC 31.5 - 36.5 g/dL 29.3 (L) 30.1 (L)   RDW 10.0 - 15.0 % 21.8 (H) 19.1 (H)   % Neutrophils % 49 37   % Lymphocytes % 38 44   % Monocytes % 13 19   % Eosinophils % 0 0   % Basophils % 0 0   Absolute Basophils 0.0 - 0.2 10e3/uL 0.0 0.0   NRBC/W <=0 % 3 (H) 1 (H)   Absolute Neutrophil 1.6 - 8.3 10e3/uL 1.0 (L) 0.8 (L)   Absolute Lymphocytes 0.8 - 5.3 10e3/uL 0.8 1.0   Absolute Monocytes 0.0 - 1.3 10e3/uL 0.3 0.4   Absolute Eosinophils 0.0 - 0.7 10e3/uL 0.0 0.0   Absolute NRBCs <=0.0 10e3/uL 0.1 (H) 0.0   RBC Morphology  Confirmed RBC Indices Confirmed RBC Indices   Platelet Morphology Automated Count Confirmed. Platelet morphology is normal.  Automated Count Confirmed. Platelet  morphology is normal. Automated Count Confirmed. Platelet morphology is normal.   Acanthocytes None Seen  Slight !    Target Cells None Seen  Slight ! Slight !   Elliptocytes None Seen   Slight !   [place] Cells None Seen   Slight !   % Retic 0.5 - 2.0 %  2.9 (H)   Absolute Retic 0.025 - 0.095 10e6/uL  0.074   INR 0.85 - 1.15  1.81 (H) 1.73 (H)   (H): Data is abnormally high  (L): Data is abnormally low  !: Data is abnormal  [1] Effective December 21, 2021 eGFRcr in adults is calculated using the 2021 CKD-EPI creatinine equation which includes age and gender ( et al., NEJM, DOI: 10.1056/MHJTmi6292124)  [2] Effective December 21, 2021 eGFRcr in adults is calculated using the 2021 CKD-EPI creatinine equation which includes age and gender ( et al., NEJM, DOI: 10.1056/TKGZwx6257937)        Again, thank you for allowing me to participate in the care of your patient.      Sincerely,    Rafita Rogel MD

## 2022-04-14 NOTE — PATIENT INSTRUCTIONS
Contact Numbers  [facility]: [phone] (for symptom and scheduling needs)    Please call the [facility] Triage line if you experience a temperature greater than or equal to 100.4, shaking chills, have uncontrolled nausea, vomiting and/or diarrhea, dizziness, shortness of breath, chest pain, bleeding, unexplained bruising, or if you have any other new/concerning symptoms, questions or concerns.     If you are having any concerning symptoms or wish to speak to a provider before your next infusion visit, please call your care coordinator or triage to notify them so we can adequately serve you.     If you need a refill on a narcotic prescription or other medication, please call triage before your infusion appointment.

## 2022-04-14 NOTE — NURSING NOTE
"Oncology Rooming Note    April 14, 2022 3:16 PM   Bonny Raymundo is a 76 year old female who presents for:    Chief Complaint   Patient presents with     Blood Draw     Labs drawn via  by RN in lab. VS taken.      Oncology Clinic Visit     Rtn Aplastic Anemia     Initial Vitals: /64 (BP Location: Right arm, Patient Position: Sitting, Cuff Size: Adult Regular)   Pulse 102   Temp 98  F (36.7  C) (Oral)   Resp 18   Wt 48 kg (105 lb 14.4 oz)   SpO2 98%   BMI 19.37 kg/m   Estimated body mass index is 19.37 kg/m  as calculated from the following:    Height as of 1/21/22: 1.575 m (5' 2\").    Weight as of this encounter: 48 kg (105 lb 14.4 oz). Body surface area is 1.45 meters squared.  No Pain (0) Comment: Data Unavailable   No LMP recorded. Patient has had a hysterectomy.  Allergies reviewed: Yes  Medications reviewed: Yes    Medications: Medication refills not needed today.  Pharmacy name entered into [facility]:    [facility] [place], [place] - [address], [address]  [facility] [place] - [address] [id number] - [place], [place] - [address] [facility]    Clinical concerns: Pt says something is wrong with left eye. Says she still has shingles, gabapentin didn't help so she stopped taking. MD was notified.      Delaney Aguirre, EMT            "

## 2022-04-14 NOTE — PROGRESS NOTES
/64 (BP Location: Right arm, Patient Position: Sitting, Cuff Size: Adult Regular)   Pulse 102   Temp 98  F (36.7  C) (Oral)   Resp 18   Wt 48 kg (105 lb 14.4 oz)   SpO2 98%   BMI 19.37 kg/m    Wt Readings from Last 4 Encounters:   04/14/22 48 kg (105 lb 14.4 oz)   01/21/22 50.3 kg (111 lb)   12/09/21 54 kg (119 lb)   12/07/21 54 kg (119 lb)     Bonny returns to follow-up her aplastic anemia.  She has been feeling well with no recent fever chills rash or bleeding.  She has some residual itching and stinging from her shingles on her right forehead near her eye from some months ago but there are no visible cutaneous lesions anymore and her vision she thinks is acceptable.  She has a small some conjunctival hemorrhage in the left eye but that is asymptomatic.  She has had no GI  or other new symptoms and generally feels well.    Her exam shows no rash, petechaie or edema; No bone tenderness or enlarged nodes.  Oral mucosa negative s petech.   Lungs clear and cor reg.  No abd tenderness or distension  No R forehead lesions where she is itchy.    Laboratory studies show stable mild pancytopenia.  Because she was iron depleted (though not severely iron deficient) last year and only received 1 infusion of iron sucrose, will give her a second infusion today to ensure that iron depletion is not in any way contributing to her persisting anemia.  It is notable that she has macrocytic red cells but they are hypochromic by indices. B12 has been replaced orally with good recovery of B 12 levels.      I made no other change in her ongoing therapy and she will continue her anticoagulation as before but will still watch her blood counts carefully every 6 to 8 weeks at a minimum.    Rafita Rogel MD    Professor of medicine     Latest Reference Range & Units 03/03/22 11:06 04/14/22 14:53   Sodium 133 - 144 mmol/L 139 140   Potassium 3.4 - 5.3 mmol/L 4.5 3.8   Chloride 94 - 109 mmol/L 108 108   Carbon Dioxide 20 - 32  mmol/L 27 25   Urea Nitrogen 7 - 30 mg/dL 24 24   Creatinine 0.52 - 1.04 mg/dL 1.14 (H) 1.17 (H)   GFR Estimate >60 mL/min/1.73m2 49 (L) [1] 48 (L) [2]   Calcium 8.5 - 10.1 mg/dL 8.7 8.6   Anion Gap 3 - 14 mmol/L 4 7   Magnesium 1.6 - 2.3 mg/dL 1.7    Albumin 3.4 - 5.0 g/dL 2.5 (L) 2.6 (L)   Protein Total 6.8 - 8.8 g/dL 7.0 7.5   Bilirubin Total 0.2 - 1.3 mg/dL 0.5 0.4   Alkaline Phosphatase 40 - 150 U/L 80 90   ALT 0 - 50 U/L 15 16   AST 0 - 45 U/L 14 16   Ferritin 8 - 252 ng/mL  107   Iron 35 - 180 ug/dL  52   Iron Binding Cap 240 - 430 ug/dL  174 (L)   Iron Saturation Index 15 - 46 %  30   Vitamin B12 193 - 986 pg/mL  2,462 (H)   Glucose 70 - 99 mg/dL 108 (H) 127 (H)   WBC 4.0 - 11.0 10e3/uL 2.1 (L) 2.2 (L)   Hemoglobin 11.7 - 15.7 g/dL 7.7 (L) 8.3 (L)   Hematocrit 35.0 - 47.0 % 26.3 (L) 27.6 (L)   Platelet Count 150 - 450 10e3/uL 180 112 (L)   RBC Count 3.80 - 5.20 10e6/uL 2.33 (L) 2.56 (L)   MCV 78 - 100 fL 113 (H) 108 (H)   MCH 26.5 - 33.0 pg 33.0 32.4   MCHC 31.5 - 36.5 g/dL 29.3 (L) 30.1 (L)   RDW 10.0 - 15.0 % 21.8 (H) 19.1 (H)   % Neutrophils % 49 37   % Lymphocytes % 38 44   % Monocytes % 13 19   % Eosinophils % 0 0   % Basophils % 0 0   Absolute Basophils 0.0 - 0.2 10e3/uL 0.0 0.0   NRBC/W <=0 % 3 (H) 1 (H)   Absolute Neutrophil 1.6 - 8.3 10e3/uL 1.0 (L) 0.8 (L)   Absolute Lymphocytes 0.8 - 5.3 10e3/uL 0.8 1.0   Absolute Monocytes 0.0 - 1.3 10e3/uL 0.3 0.4   Absolute Eosinophils 0.0 - 0.7 10e3/uL 0.0 0.0   Absolute NRBCs <=0.0 10e3/uL 0.1 (H) 0.0   RBC Morphology  Confirmed RBC Indices Confirmed RBC Indices   Platelet Morphology Automated Count Confirmed. Platelet morphology is normal.  Automated Count Confirmed. Platelet morphology is normal. Automated Count Confirmed. Platelet morphology is normal.   Acanthocytes None Seen  Slight !    Target Cells None Seen  Slight ! Slight !   Elliptocytes None Seen   Slight !   [place] Cells None Seen   Slight !   % Retic 0.5 - 2.0 %  2.9 (H)   Absolute Retic 0.025 -  0.095 10e6/uL  0.074   INR 0.85 - 1.15  1.81 (H) 1.73 (H)   (H): Data is abnormally high  (L): Data is abnormally low  !: Data is abnormal  [1] Effective December 21, 2021 eGFRcr in adults is calculated using the 2021 CKD-EPI creatinine equation which includes age and gender ( et al., NEJ, DOI: 10.1056/UNQPmy8435879)  [2] Effective December 21, 2021 eGFRcr in adults is calculated using the 2021 CKD-EPI creatinine equation which includes age and gender ( et al., NEJM, DOI: 10.1056/TZJLjf7633807)

## 2022-04-14 NOTE — LETTER
4/14/2022         RE: Bonny Raymundo  [address]  [facility] [address]      /64 (BP Location: Right arm, Patient Position: Sitting, Cuff Size: Adult Regular)   Pulse 102   Temp 98  F (36.7  C) (Oral)   Resp 18   Wt 48 kg (105 lb 14.4 oz)   SpO2 98%   BMI 19.37 kg/m    Wt Readings from Last 4 Encounters:   04/14/22 48 kg (105 lb 14.4 oz)   01/21/22 50.3 kg (111 lb)   12/09/21 54 kg (119 lb)   12/07/21 54 kg (119 lb)     Bonny returns to follow-up her aplastic anemia.  She has been feeling well with no recent fever chills rash or bleeding.  She has some residual itching and stinging from her shingles on her right forehead near her eye from some months ago but there are no visible cutaneous lesions anymore and her vision she thinks is acceptable.  She has a small some conjunctival hemorrhage in the left eye but that is asymptomatic.  She has had no GI  or other new symptoms and generally feels well.    Her exam shows no rash, petechaie or edema; No bone tenderness or enlarged nodes.  Oral mucosa negative s petech.   Lungs clear and cor reg.  No abd tenderness or distension  No R forehead lesions where she is itchy.    Laboratory studies show stable mild pancytopenia.  Because she was iron depleted (though not severely iron deficient) last year and only received 1 infusion of iron sucrose, will give her a second infusion today to ensure that iron depletion is not in any way contributing to her persisting anemia.  It is notable that she has macrocytic red cells but they are hypochromic by indices. B12 has been replaced orally with good recovery of B 12 levels.      I made no other change in her ongoing therapy and she will continue her anticoagulation as before but will still watch her blood counts carefully every 6 to 8 weeks at a minimum.    Rafita Rogel MD    Professor of medicine     Latest Reference Range & Units 03/03/22 11:06 04/14/22 14:53   Sodium 133 - 144 mmol/L 139 140    Potassium 3.4 - 5.3 mmol/L 4.5 3.8   Chloride 94 - 109 mmol/L 108 108   Carbon Dioxide 20 - 32 mmol/L 27 25   Urea Nitrogen 7 - 30 mg/dL 24 24   Creatinine 0.52 - 1.04 mg/dL 1.14 (H) 1.17 (H)   GFR Estimate >60 mL/min/1.73m2 49 (L) [1] 48 (L) [2]   Calcium 8.5 - 10.1 mg/dL 8.7 8.6   Anion Gap 3 - 14 mmol/L 4 7   Magnesium 1.6 - 2.3 mg/dL 1.7    Albumin 3.4 - 5.0 g/dL 2.5 (L) 2.6 (L)   Protein Total 6.8 - 8.8 g/dL 7.0 7.5   Bilirubin Total 0.2 - 1.3 mg/dL 0.5 0.4   Alkaline Phosphatase 40 - 150 U/L 80 90   ALT 0 - 50 U/L 15 16   AST 0 - 45 U/L 14 16   Ferritin 8 - 252 ng/mL  107   Iron 35 - 180 ug/dL  52   Iron Binding Cap 240 - 430 ug/dL  174 (L)   Iron Saturation Index 15 - 46 %  30   Vitamin B12 193 - 986 pg/mL  2,462 (H)   Glucose 70 - 99 mg/dL 108 (H) 127 (H)   WBC 4.0 - 11.0 10e3/uL 2.1 (L) 2.2 (L)   Hemoglobin 11.7 - 15.7 g/dL 7.7 (L) 8.3 (L)   Hematocrit 35.0 - 47.0 % 26.3 (L) 27.6 (L)   Platelet Count 150 - 450 10e3/uL 180 112 (L)   RBC Count 3.80 - 5.20 10e6/uL 2.33 (L) 2.56 (L)   MCV 78 - 100 fL 113 (H) 108 (H)   MCH 26.5 - 33.0 pg 33.0 32.4   MCHC 31.5 - 36.5 g/dL 29.3 (L) 30.1 (L)   RDW 10.0 - 15.0 % 21.8 (H) 19.1 (H)   % Neutrophils % 49 37   % Lymphocytes % 38 44   % Monocytes % 13 19   % Eosinophils % 0 0   % Basophils % 0 0   Absolute Basophils 0.0 - 0.2 10e3/uL 0.0 0.0   NRBC/W <=0 % 3 (H) 1 (H)   Absolute Neutrophil 1.6 - 8.3 10e3/uL 1.0 (L) 0.8 (L)   Absolute Lymphocytes 0.8 - 5.3 10e3/uL 0.8 1.0   Absolute Monocytes 0.0 - 1.3 10e3/uL 0.3 0.4   Absolute Eosinophils 0.0 - 0.7 10e3/uL 0.0 0.0   Absolute NRBCs <=0.0 10e3/uL 0.1 (H) 0.0   RBC Morphology  Confirmed RBC Indices Confirmed RBC Indices   Platelet Morphology Automated Count Confirmed. Platelet morphology is normal.  Automated Count Confirmed. Platelet morphology is normal. Automated Count Confirmed. Platelet morphology is normal.   Acanthocytes None Seen  Slight !    Target Cells None Seen  Slight ! Slight !   Elliptocytes None Seen    Slight !   Qu[place] Cells None Seen   Slight !   % Retic 0.5 - 2.0 %  2.9 (H)   Absolute Retic 0.025 - 0.095 10e6/uL  0.074   INR 0.85 - 1.15  1.81 (H) 1.73 (H)   (H): Data is abnormally high  (L): Data is abnormally low  !: Data is abnormal  [1] Effective December 21, 2021 eGFRcr in adults is calculated using the 2021 CKD-EPI creatinine equation which includes age and gender ( et al., NEJM, DOI: 10.1056/EKMKax4345976)  [2] Effective December 21, 2021 eGFRcr in adults is calculated using the 2021 CKD-EPI creatinine equation which includes age and gender ( et al., NEJM, DOI: 10.1056/OYFHlp9198329)        Rafita Rogel MD

## 2022-04-14 NOTE — PROGRESS NOTES
Infusion Nursing Note:  Bonny Raymundo presents today for Venofer.    Patient seen by provider today: Yes: Dr. Rogel   present during visit today: Not Applicable.    Note:   TORCLAU Rogel/Hawa Quesada, ANABELLE on 4/14/22  Give Venofer today.  Patient only needs 1 dose.      Intravenous Access:  Peripheral IV placed.    Treatment Conditions:   Latest Reference Range & Units 04/14/22 14:53   Sodium 133 - 144 mmol/L 140   Potassium 3.4 - 5.3 mmol/L 3.8   Chloride 94 - 109 mmol/L 108   Carbon Dioxide 20 - 32 mmol/L 25   Urea Nitrogen 7 - 30 mg/dL 24   Creatinine 0.52 - 1.04 mg/dL 1.17 (H)   GFR Estimate >60 mL/min/1.73m2 48 (L) [1]   Calcium 8.5 - 10.1 mg/dL 8.6   Anion Gap 3 - 14 mmol/L 7   Albumin 3.4 - 5.0 g/dL 2.6 (L)   Protein Total 6.8 - 8.8 g/dL 7.5   Bilirubin Total 0.2 - 1.3 mg/dL 0.4   Alkaline Phosphatase 40 - 150 U/L 90   ALT 0 - 50 U/L 16   AST 0 - 45 U/L 16   Ferritin 8 - 252 ng/mL 107   Iron 35 - 180 ug/dL 52   Iron Binding Cap 240 - 430 ug/dL 174 (L)   Iron Saturation Index 15 - 46 % 30   Vitamin B12 193 - 986 pg/mL 2,462 (H)   Glucose 70 - 99 mg/dL 127 (H)   WBC 4.0 - 11.0 10e3/uL 2.2 (L)   Hemoglobin 11.7 - 15.7 g/dL 8.3 (L)   Hematocrit 35.0 - 47.0 % 27.6 (L)   Platelet Count 150 - 450 10e3/uL 112 (L)   RBC Count 3.80 - 5.20 10e6/uL 2.56 (L)   MCV 78 - 100 fL 108 (H)   MCH 26.5 - 33.0 pg 32.4   MCHC 31.5 - 36.5 g/dL 30.1 (L)   RDW 10.0 - 15.0 % 19.1 (H)   % Neutrophils % 37   % Lymphocytes % 44   % Monocytes % 19   % Eosinophils % 0   % Basophils % 0   Absolute Basophils 0.0 - 0.2 10e3/uL 0.0   NRBC/W <=0 % 1 (H)   Absolute Neutrophil 1.6 - 8.3 10e3/uL 0.8 (L)   Absolute Lymphocytes 0.8 - 5.3 10e3/uL 1.0   Absolute Monocytes 0.0 - 1.3 10e3/uL 0.4   Absolute Eosinophils 0.0 - 0.7 10e3/uL 0.0         Post Infusion Assessment:  Patient tolerated infusion without incident.  Blood return noted pre and post infusion.  Site patent and intact, free from redness, edema or discomfort.  No  evidence of extravasations.  Access discontinued per protocol.  Confirmed with juanita Falcon that Venofer does not need an observation.       Discharge Plan:   Discharge instructions reviewed with: Patient.  Patient and/or family verbalized understanding of discharge instructions and all questions answered.  AVS to patient via H2scanT.  Next appointment with Dr. Rogel to be in 5-7 weeks per Dr. Rogel's check out note.  Patient aware to watch for this appointment.  Patient discharged in stable condition accompanied by: self.  Departure Mode: Ambulatory.      Hawa Quesada RN

## 2022-04-17 ENCOUNTER — HEALTH MAINTENANCE LETTER (OUTPATIENT)
Age: 77
End: 2022-04-17

## 2022-04-28 ENCOUNTER — OFFICE VISIT (OUTPATIENT)
Dept: FAMILY MEDICINE | Facility: CLINIC | Age: 77
End: 2022-04-28
Payer: COMMERCIAL

## 2022-04-28 VITALS
WEIGHT: 104 LBS | SYSTOLIC BLOOD PRESSURE: 107 MMHG | HEART RATE: 60 BPM | BODY MASS INDEX: 19.14 KG/M2 | OXYGEN SATURATION: 94 % | TEMPERATURE: 97.7 F | HEIGHT: 62 IN | DIASTOLIC BLOOD PRESSURE: 68 MMHG | RESPIRATION RATE: 16 BRPM

## 2022-04-28 DIAGNOSIS — E78.5 HYPERLIPIDEMIA LDL GOAL <100: ICD-10-CM

## 2022-04-28 DIAGNOSIS — H61.21 IMPACTED CERUMEN OF RIGHT EAR: Primary | ICD-10-CM

## 2022-04-28 DIAGNOSIS — H90.3 SENSORINEURAL HEARING LOSS OF BOTH EARS: ICD-10-CM

## 2022-04-28 DIAGNOSIS — J44.9 CHRONIC OBSTRUCTIVE PULMONARY DISEASE, UNSPECIFIED COPD TYPE (H): ICD-10-CM

## 2022-04-28 DIAGNOSIS — J30.2 SEASONAL ALLERGIC RHINITIS, UNSPECIFIED TRIGGER: ICD-10-CM

## 2022-04-28 PROCEDURE — 99214 OFFICE O/P EST MOD 30 MIN: CPT | Mod: 25 | Performed by: INTERNAL MEDICINE

## 2022-04-28 PROCEDURE — 69209 REMOVE IMPACTED EAR WAX UNI: CPT | Performed by: INTERNAL MEDICINE

## 2022-04-28 ASSESSMENT — PAIN SCALES - GENERAL: PAINLEVEL: NO PAIN (0)

## 2022-04-28 NOTE — PROGRESS NOTES
Subjective   Bonny is a 76 year old who presents for the following health issues     HPI         Chief Complaint:     Ear concern    HPI:   Patient Bonny Raymundo is a very pleasant 76 year old female with history of COPD, hearing loss, GERD who presents to Internal Medicine clinic today for cerumen impaction symptoms in both ears. Patient is also followed by the ENT clinic in [place] for chronic hearing loss symptoms in both ears. No chest pain, headaches, fever or chills.        Current Medications:     Current Outpatient Medications   Medication Sig Dispense Refill     Cyanocobalamin (VITAMIN B-12 PO) Take 1 tablet by mouth daily       diphenhydrAMINE (BENADRYL) 25 MG tablet Take 25 mg by mouth At Bedtime  56 tablet      ELIQUIS ANTICOAGULANT 5 MG tablet TAKE 1 TABLET BY MOUTH TWICE A  tablet 1     multivitamin w/minerals (THERA-VIT-M) tablet Take 1 tablet by mouth daily       pantoprazole (PROTONIX) 20 MG EC tablet TAKE 1 TABLET BY MOUTH EVERY DAY 90 tablet 11         Allergies:      Allergies   Allergen Reactions     Cats      Dogs      Seasonal Allergies             Past Medical History:     Past Medical History:   Diagnosis Date     Allergic rhinitis due to other allergen      Aplastic anemia (H) 1/9/2017     Closed anterior dislocation of humerus      DVT of lower extremity (deep venous thrombosis) (H) 10-12    Left after prolonged sitting-plane     DVT, recurrent, lower extremity, acute (H) 2014     Headache(784.0)      Osteoporosis, unspecified      Pulmonary emboli (H) 10-12     Sensorineural hearing loss, unspecified      Sprain of ankle, unspecified site     L         Past Surgical History:     Past Surgical History:   Procedure Laterality Date     ARTHRODESIS FOOT  7-18-11    Hallux valgus R-1st MP joint     BLEPHAROPLASTY BILATERAL  2012, 2014     BONE MARROW BIOPSY, BONE SPECIMEN, NEEDLE/TROCAR N/A 9/26/2016    Procedure: BIOPSY BONE MARROW;  Surgeon: Mu Morgan MD;  Location:   GI     BONE MARROW BIOPSY, BONE SPECIMEN, NEEDLE/TROCAR N/A 2016    Procedure: BIOPSY BONE MARROW;  Surgeon: Mu Morgan MD;  Location:  GI     C DEXA INTERPRETATION, AXIAL  03     CATARACT IOL, RT/LT Right      CATARACT IOL, RT/LT Left      COLONOSCOPY N/A 2018    Procedure: COLONOSCOPY;  colonoscopy;  Surgeon: Meliton Castrejon MD;  Location:  GI     EXCHANGE INTRAOCULAR LENS IMPLANT Right 2015    Procedure: EXCHANGE INTRAOCULAR LENS IMPLANT;  Surgeon: Garrett Dawson MD;  Location:  EC     PICC INSERTION Left 2017    5fr DL BioFlo PICC, 42cm (2cm external) in the L basilic vein w/ tip in the  SVC RA junction.     VITRECTOMY PARSPLANA WITH 23 GAUGE SYSTEM Right 2015    Procedure: VITRECTOMY PARSPLANA WITH 23 GAUGE SYSTEM;  Surgeon: Racheal Loyd MD;  Location:  EC     ZZC NONSPECIFIC PROCEDURE           ZZC NONSPECIFIC PROCEDURE      hysterectomy/BSO     ZZC NONSPECIFIC PROCEDURE      myomectomy (fibroids)     ZZHC COLONOSCOPY THRU STOMA, DIAGNOSTIC      normal- minimal diverticulosis         Family Medical History:     Family History   Problem Relation Age of Onset     Musculoskeletal Disorder Mother         MS     Heart Disease Father      Heart Disease Brother         afib         Social History:     Social History     Socioeconomic History     Marital status:      Spouse name: Not on file     Number of children: 1     Years of education: Not on file     Highest education level: Not on file   Occupational History     Occupation: purchasing [employer]   Tobacco Use     Smoking status: Former Smoker     Quit date: 1973     Years since quittin.9     Smokeless tobacco: Never Used   Substance and Sexual Activity     Alcohol use: No     Alcohol/week: 0.0 standard drinks     Comment: occasionally     Drug use: No     Sexual activity: Not Currently     Partners: Male   Other Topics Concern     Not on file  "  Social History Narrative     Not on file     Social Determinants of Health     Financial Resource Strain: Not on file   Food Insecurity: Not on file   Transportation Needs: Not on file   Physical Activity: Not on file   Stress: Not on file   Social Connections: Not on file   Intimate Partner Violence: Not on file   Housing Stability: Not on file           Review of System:     Constitutional: Negative for fever or chills  Skin: Negative for rashes  Ears/Nose/Throat: Negative for nasal congestion, sore throat, positive for right ear cerumen impaction symptoms, hearing loss in both ears  Respiratory: No shortness of breath, dyspnea on exertion, cough, or hemoptysis  Cardiovascular: Negative for chest pain  Gastrointestinal: Negative for nausea, vomiting  Genitourinary: Negative for dysuria, hematuria  Musculoskeletal: Negative for myalgias  Neurologic: Negative for headaches  Psychiatric: Negative for depression, anxiety  Hematologic/Lymphatic/Immunologic: Negative  Endocrine: Negative  Behavioral: Negative for tobacco use       Physical Exam:   /68 (BP Location: Right arm, Patient Position: Sitting, Cuff Size: Adult Regular)   Pulse 60   Temp 97.7  F (36.5  C) (Temporal)   Resp 16   Ht 1.575 m (5' 2\")   Wt 47.2 kg (104 lb)   SpO2 94%   BMI 19.02 kg/m      GENERAL: alert and no distress  EYES: eyes grossly normal to inspection, and conjunctivae and sclerae normal  HENT: Normocephalic atraumatic. Nose and mouth without ulcers or lesions, cerumen impaction symptoms noted in right ear  NECK: supple  RESP: lungs clear to auscultation   CV: regular rate and rhythm, normal S1 S2  LYMPH: no peripheral edema   ABDOMEN: nondistended  MS: no gross musculoskeletal defects noted  SKIN: no suspicious lesions or rashes  NEURO: Alert & Oriented x 3. Hearing loss symptoms present in both ears  PSYCH: mentation appears normal, affect normal        Diagnostic Test Results:     Diagnostic Test Results:  Labs reviewed in " Epic    ASSESSMENT/PLAN:       Bonny was seen today for ear problem.    Diagnoses and all orders for this visit:  Sensorineural hearing loss of both ears  Impacted cerumen of right ear  -     VT REMOVAL IMPACTED CERUMEN IRRIGATION/LVG UNILAT  - patient's TM is clear, visible in right ear after cerumen removal in clinic today  - continue outpatient ENT clinic follow up going forward      Chronic obstructive pulmonary disease, unspecified COPD type (H)  - stable, continue current therapy      Seasonal allergic rhinitis, unspecified trigger  - stalbe, continue current therapy    Hyperlipidemia  - stalbe, continue current therapy      Follow Up Plan:     Patient is instructed to return to Internal Medicine clinic for follow-up visit in 1 month.        Abbie Silva MD  Internal Medicine  [facility]

## 2022-04-28 NOTE — PROGRESS NOTES
Patient identified using two patient identifiers.  Ear exam showing wax occlusion completed by provider.  Solution: warm water was placed in the right ear(s) via irrigation tool: elephant ear.  Feliciano Gomez CMA on 4/28/2022 at 10:06 AM

## 2022-06-02 ENCOUNTER — OFFICE VISIT (OUTPATIENT)
Dept: TRANSPLANT | Facility: CLINIC | Age: 77
End: 2022-06-02
Attending: INTERNAL MEDICINE
Payer: COMMERCIAL

## 2022-06-02 ENCOUNTER — APPOINTMENT (OUTPATIENT)
Dept: LAB | Facility: CLINIC | Age: 77
End: 2022-06-02
Attending: INTERNAL MEDICINE
Payer: COMMERCIAL

## 2022-06-02 VITALS
BODY MASS INDEX: 18.66 KG/M2 | WEIGHT: 102 LBS | OXYGEN SATURATION: 99 % | DIASTOLIC BLOOD PRESSURE: 74 MMHG | TEMPERATURE: 98 F | HEART RATE: 105 BPM | SYSTOLIC BLOOD PRESSURE: 115 MMHG

## 2022-06-02 DIAGNOSIS — D61.9 APLASTIC ANEMIA (H): ICD-10-CM

## 2022-06-02 DIAGNOSIS — N18.4 CKD (CHRONIC KIDNEY DISEASE) STAGE 4, GFR 15-29 ML/MIN (H): ICD-10-CM

## 2022-06-02 DIAGNOSIS — D61.3 IDIOPATHIC APLASTIC ANEMIA (H): ICD-10-CM

## 2022-06-02 DIAGNOSIS — D61.818 PANCYTOPENIA (H): ICD-10-CM

## 2022-06-02 DIAGNOSIS — R19.7 DIARRHEA, UNSPECIFIED TYPE: Primary | ICD-10-CM

## 2022-06-02 LAB
ABO/RH(D): NORMAL
ALBUMIN SERPL-MCNC: 2.1 G/DL (ref 3.4–5)
ALP SERPL-CCNC: 104 U/L (ref 40–150)
ALT SERPL W P-5'-P-CCNC: 12 U/L (ref 0–50)
ANION GAP SERPL CALCULATED.3IONS-SCNC: 8 MMOL/L (ref 3–14)
ANTIBODY SCREEN: NEGATIVE
AST SERPL W P-5'-P-CCNC: 14 U/L (ref 0–45)
BASOPHILS # BLD AUTO: 0 10E3/UL (ref 0–0.2)
BASOPHILS NFR BLD AUTO: 1 %
BILIRUB SERPL-MCNC: 0.4 MG/DL (ref 0.2–1.3)
BUN SERPL-MCNC: 24 MG/DL (ref 7–30)
CALCIUM SERPL-MCNC: 8.4 MG/DL (ref 8.5–10.1)
CHLORIDE BLD-SCNC: 108 MMOL/L (ref 94–109)
CO2 SERPL-SCNC: 25 MMOL/L (ref 20–32)
CREAT SERPL-MCNC: 1.28 MG/DL (ref 0.52–1.04)
EOSINOPHIL # BLD AUTO: 0 10E3/UL (ref 0–0.7)
EOSINOPHIL NFR BLD AUTO: 0 %
ERYTHROCYTE [DISTWIDTH] IN BLOOD BY AUTOMATED COUNT: 22.5 % (ref 10–15)
GFR SERPL CREATININE-BSD FRML MDRD: 43 ML/MIN/1.73M2
GLUCOSE BLD-MCNC: 101 MG/DL (ref 70–99)
HCT VFR BLD AUTO: 22.7 % (ref 35–47)
HGB BLD-MCNC: 6.5 G/DL (ref 11.7–15.7)
IMM GRANULOCYTES # BLD: 0.1 10E3/UL
IMM GRANULOCYTES NFR BLD: 2 %
LYMPHOCYTES # BLD AUTO: 1.2 10E3/UL (ref 0.8–5.3)
LYMPHOCYTES NFR BLD AUTO: 52 %
MAGNESIUM SERPL-MCNC: 1.8 MG/DL (ref 1.6–2.3)
MCH RBC QN AUTO: 32.8 PG (ref 26.5–33)
MCHC RBC AUTO-ENTMCNC: 28.6 G/DL (ref 31.5–36.5)
MCV RBC AUTO: 115 FL (ref 78–100)
MONOCYTES # BLD AUTO: 0.3 10E3/UL (ref 0–1.3)
MONOCYTES NFR BLD AUTO: 14 %
NEUTROPHILS # BLD AUTO: 0.7 10E3/UL (ref 1.6–8.3)
NEUTROPHILS NFR BLD AUTO: 31 %
NRBC # BLD AUTO: 0.1 10E3/UL
NRBC BLD AUTO-RTO: 3 /100
PLATELET # BLD AUTO: 121 10E3/UL (ref 150–450)
POTASSIUM BLD-SCNC: 3.9 MMOL/L (ref 3.4–5.3)
PROT SERPL-MCNC: 7.1 G/DL (ref 6.8–8.8)
RBC # BLD AUTO: 1.98 10E6/UL (ref 3.8–5.2)
RETICS # AUTO: 0.09 10E6/UL (ref 0.03–0.1)
RETICS/RBC NFR AUTO: 4.4 % (ref 0.5–2)
SODIUM SERPL-SCNC: 141 MMOL/L (ref 133–144)
SPECIMEN EXPIRATION DATE: NORMAL
WBC # BLD AUTO: 2.2 10E3/UL (ref 4–11)

## 2022-06-02 PROCEDURE — 83735 ASSAY OF MAGNESIUM: CPT | Performed by: INTERNAL MEDICINE

## 2022-06-02 PROCEDURE — 85025 COMPLETE CBC W/AUTO DIFF WBC: CPT | Performed by: INTERNAL MEDICINE

## 2022-06-02 PROCEDURE — 36415 COLL VENOUS BLD VENIPUNCTURE: CPT | Performed by: INTERNAL MEDICINE

## 2022-06-02 PROCEDURE — 99214 OFFICE O/P EST MOD 30 MIN: CPT | Performed by: INTERNAL MEDICINE

## 2022-06-02 PROCEDURE — 82040 ASSAY OF SERUM ALBUMIN: CPT | Performed by: INTERNAL MEDICINE

## 2022-06-02 PROCEDURE — G0463 HOSPITAL OUTPT CLINIC VISIT: HCPCS

## 2022-06-02 PROCEDURE — 80053 COMPREHEN METABOLIC PANEL: CPT | Performed by: INTERNAL MEDICINE

## 2022-06-02 PROCEDURE — 85045 AUTOMATED RETICULOCYTE COUNT: CPT | Performed by: INTERNAL MEDICINE

## 2022-06-02 PROCEDURE — 86850 RBC ANTIBODY SCREEN: CPT | Performed by: INTERNAL MEDICINE

## 2022-06-02 RX ORDER — HEPARIN SODIUM (PORCINE) LOCK FLUSH IV SOLN 100 UNIT/ML 100 UNIT/ML
5 SOLUTION INTRAVENOUS
Status: CANCELLED | OUTPATIENT
Start: 2022-06-03

## 2022-06-02 RX ORDER — HEPARIN SODIUM,PORCINE 10 UNIT/ML
5 VIAL (ML) INTRAVENOUS
Status: CANCELLED | OUTPATIENT
Start: 2022-06-03

## 2022-06-02 RX ORDER — LOPERAMIDE HYDROCHLORIDE 2 MG/1
1 TABLET ORAL DAILY
COMMUNITY
Start: 2022-06-02 | End: 2023-01-01

## 2022-06-02 ASSESSMENT — PAIN SCALES - GENERAL: PAINLEVEL: MODERATE PAIN (5)

## 2022-06-02 NOTE — LETTER
6/2/2022         RE: Bonny Raymundo  [address]  [place] [address]        Dear Colleague,    Thank you for referring your patient, Bonny Raymundo, to the [facility] BLOOD AND MARROW TRANSPLANT PROGRAM [place]. Please see a copy of my visit note below.    /74   Pulse 105   Temp 98  F (36.7  C) (Oral)   Wt 46.3 kg (102 lb)   SpO2 99%   BMI 18.66 kg/m    Wt Readings from Last 4 Encounters:   06/02/22 46.3 kg (102 lb)   04/28/22 47.2 kg (104 lb)   04/14/22 48 kg (105 lb 14.4 oz)   01/21/22 50.3 kg (111 lb)     Bonny returns to follow-up her aplastic anemia.  She has been off immunosuppression for some time stopping CSA last year in May (for persisting renal insufficiency) and eltrombopag in June for abnormal LFTs.  She still has irritation and itching on her right forehead from her prior shingles and has generally felt more tired.  Sometimes when standing she has to stop to steady herself but is a bit uncertain to figure out how long that has been going on.  She has not had external bleeding or bruising.  She has not had recent fevers or chills or other symptoms seeming like infection.  Sometime ago she had a plugged left ear and her primary care office cleaned some wax out of it but that has not been as prominent recently.  She has had no rash or new areas of bone pain.  Mostly she is just been more tired.  She has no nausea but has episodic loose stools and has been taking 1 Imodium or a half a pill daily with reasonably good control of her symptoms.  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 plus radiographic findings of previous cerebrovascular disease and continues on eliquis.    Her exam shows her looking tired but in no acute distress.  She has lost 9 pounds in the last 6 months and her vital signs are acceptable.  She has no edema or focal bone tenderness.  She has no bruises  or petechiae.  Her oropharynx is clear without mucosal lesions.  Both tympanic membranes are clear and neither ear was blocked with earwax.  Her lungs were clear without rales or wheezing; her heart tones were quiet but regular.  Her abdomen was soft without hepatosplenomegaly tenderness or rebound.  SHe had no rash or LE edema; and no LE or oral petechiae  She has a subconjunctival hemorrhage in her L eye.    Laboratory testing showed her more anemic than previously but her white count and platelets are stable.   Her mild renal insufficiency is stable as well and her electrolytes are acceptable.  Her reticulocyte count is 88,000 and has been roughly stable over the last several years as its been monitored periodically.    She has had iron repletion parenterally and still takes B12 with confirmation that her serum B12 levels are acceptable.  She continues on Eliquis.    We will arrange a transfusion for her tomorrow or Saturday at [facility].  Type and screen testing has been done here at the [facility] and the blood orders are in place.      She knows to call the [facility] infusion center in the morning and confirm the timing.    Consent for transfusion orders will be signed and faxed to [facility] but her consent will need to be confirmed when she arrives for the transfusion.  She heard this in a followup call Friday AM.    She has not had known anti-Red cell antibodies in the past so our blood bank here tells me this is possible to do an electronic crossmatch since her antibody screen remains negative.    I suggested that she also get a CBC done in about 1- 2 weeks after the transfusion to see if her hemoglobin is stabilized and whether we have to pursue additional issues related to concerns about bleeding.    I made no recommendations or change in the plan to reinstitute immune suppression for her as her white count and platelets are stable and there are toxicities associated with the immunosuppression  she has had in the past.    She is aware that I will not be seeing patients after June 30 but I will connect with her later this month and she will then be introduced to one of my benign hematology physician colleagues who will follow her subsequently.    Rafita Rogel MD    Professor of medicine     Latest Reference Range & Units 03/03/22 11:06 04/14/22 14:53 06/02/22 17:41   Sodium 133 - 144 mmol/L 139 140 141   Potassium 3.4 - 5.3 mmol/L 4.5 3.8 3.9   Chloride 94 - 109 mmol/L 108 108 108   Carbon Dioxide 20 - 32 mmol/L 27 25 25   Urea Nitrogen 7 - 30 mg/dL 24 24 24   Creatinine 0.52 - 1.04 mg/dL 1.14 (H) 1.17 (H) 1.28 (H)   GFR Estimate >60 mL/min/1.73m2 49 (L) [1] 48 (L) [2] 43 (L) [3]   Calcium 8.5 - 10.1 mg/dL 8.7 8.6 8.4 (L)   Anion Gap 3 - 14 mmol/L 4 7 8   Magnesium 1.6 - 2.3 mg/dL 1.7  1.8   Albumin 3.4 - 5.0 g/dL 2.5 (L) 2.6 (L) 2.1 (L)   Protein Total 6.8 - 8.8 g/dL 7.0 7.5 7.1   Bilirubin Total 0.2 - 1.3 mg/dL 0.5 0.4 0.4   Alkaline Phosphatase 40 - 150 U/L 80 90 104   ALT 0 - 50 U/L 15 16 12   AST 0 - 45 U/L 14 16 14   Ferritin 8 - 252 ng/mL  107    Folate >=5.4 ng/mL  45.0 [4]    Iron 35 - 180 ug/dL  52    Iron Binding Cap 240 - 430 ug/dL  174 (L)    Iron Saturation Index 15 - 46 %  30    Vitamin B12 193 - 986 pg/mL  2,462 (H)    Glucose 70 - 99 mg/dL 108 (H) 127 (H) 101 (H)   WBC 4.0 - 11.0 10e3/uL 2.1 (L) 2.2 (L) 2.2 (L)   Hemoglobin 11.7 - 15.7 g/dL 7.7 (L) 8.3 (L) 6.5 (LL)   Hematocrit 35.0 - 47.0 % 26.3 (L) 27.6 (L) 22.7 (L)   Platelet Count 150 - 450 10e3/uL 180 112 (L) 121 (L)   RBC Count 3.80 - 5.20 10e6/uL 2.33 (L) 2.56 (L) 1.98 (L)   MCV 78 - 100 fL 113 (H) 108 (H) 115 (H)   MCH 26.5 - 33.0 pg 33.0 32.4 32.8   MCHC 31.5 - 36.5 g/dL 29.3 (L) 30.1 (L) 28.6 (L)   RDW 10.0 - 15.0 % 21.8 (H) 19.1 (H) 22.5 (H)   % Neutrophils % 49 37 31   % Lymphocytes % 38 44 52   % Monocytes % 13 19 14   % Eosinophils % 0 0 0   % Basophils % 0 0 1   Absolute Basophils 0.0 - 0.2 10e3/uL   0.0   Absolute  Basophils 0.0 - 0.2 10e3/uL 0.0 0.0    NRBC/W <=0 % 3 (H) 1 (H)    Absolute Neutrophil 1.6 - 8.3 10e3/uL 1.0 (L) 0.8 (L)    Absolute Lymphocytes 0.8 - 5.3 10e3/uL 0.8 1.0    Absolute Monocytes 0.0 - 1.3 10e3/uL 0.3 0.4    Absolute Eosinophils 0.0 - 0.7 10e3/uL 0.0 0.0    Absolute Eosinophils 0.0 - 0.7 10e3/uL   0.0   Absolute Immature Granulocytes <=0.4 10e3/uL   0.1   Absolute Lymphocytes 0.8 - 5.3 10e3/uL   1.2   Absolute Monocytes 0.0 - 1.3 10e3/uL   0.3   % Immature Granulocytes %   2   Absolute Neutrophils 1.6 - 8.3 10e3/uL   0.7 (L)   Absolute NRBCs 10e3/uL   0.1   Absolute NRBCs <=0.0 10e3/uL 0.1 (H) 0.0    NRBCs per 100 WBC <1 /100   3 (H)   RBC Morphology  Confirmed RBC Indices Confirmed RBC Indices    Platelet Morphology Automated Count Confirmed. Platelet morphology is normal.  Automated Count Confirmed. Platelet morphology is normal. Automated Count Confirmed. Platelet morphology is normal.    Acanthocytes None Seen  Slight !     Target Cells None Seen  Slight ! Slight !    Elliptocytes None Seen   Slight !    Quang Cells None Seen   Slight !    % Retic 0.5 - 2.0 %  2.9 (H) 4.4 (H)   Absolute Retic 0.025 - 0.095 10e6/uL  0.074 0.088   INR 0.85 - 1.15  1.81 (H) 1.73 (H)    ABO/Rh(D)    A POS   Antibody Screen Negative    Negative   SPECIMEN EXPIRATION DATE    [id number]   (H): Data is abnormally high  (L): Data is abnormally low  !: Data is abnormal  (LL): Data is critically low  [1] Effective December 21, 2021 eGFRcr in adults is calculated using the 2021 CKD-EPI creatinine equation which includes age and gender (Mariah kaur al., NEJM, DOI: 10.1056/NUMQga7361520)  [2] Effective December 21, 2021 eGFRcr in adults is calculated using the 2021 CKD-EPI creatinine equation which includes age and gender (Mariah et al., NEJM, DOI: 10.1056/IOMRbc7195092)  [3] Effective December 21, 2021 eGFRcr in adults is calculated using the 2021 CKD-EPI creatinine equation which includes age and gender (Mariah et al., NEJM,  DOI: 10.1056/ZBKRxc8498671)  [4] Deficient: <3.4 ng/mL  Indeterminate: 3.4-5.4 ng/mL  Normal: > 5.4 ng/mL        Sincerely,    Rafita Rogel MD

## 2022-06-02 NOTE — PROGRESS NOTES
/74   Pulse 105   Temp 98  F (36.7  C) (Oral)   Wt 46.3 kg (102 lb)   SpO2 99%   BMI 18.66 kg/m    Wt Readings from Last 4 Encounters:   06/02/22 46.3 kg (102 lb)   04/28/22 47.2 kg (104 lb)   04/14/22 48 kg (105 lb 14.4 oz)   01/21/22 50.3 kg (111 lb)     Bonny returns to follow-up her aplastic anemia.  She has been off immunosuppression for some time stopping CSA last year in May (for persisting renal insufficiency) and eltrombopag in June for abnormal LFTs.  She still has irritation and itching on her right forehead from her prior shingles and has generally felt more tired.  Sometimes when standing she has to stop to steady herself but is a bit uncertain to figure out how long that has been going on.  She has not had external bleeding or bruising.  She has not had recent fevers or chills or other symptoms seeming like infection.  Sometime ago she had a plugged left ear and her primary care office cleaned some wax out of it but that has not been as prominent recently.  She has had no rash or new areas of bone pain.  Mostly she is just been more tired.  She has no nausea but has episodic loose stools and has been taking 1 Imodium or a half a pill daily with reasonably good control of her symptoms.  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 plus radiographic findings of previous cerebrovascular disease and continues on eliquis.    Her exam shows her looking tired but in no acute distress.  She has lost 9 pounds in the last 6 months and her vital signs are acceptable.  She has no edema or focal bone tenderness.  She has no bruises or petechiae.  Her oropharynx is clear without mucosal lesions.  Both tympanic membranes are clear and neither ear was blocked with earwax.  Her lungs were clear without rales or wheezing; her heart tones were quiet but regular.  Her abdomen was soft without hepatosplenomegaly  tenderness or rebound.  SHe had no rash or LE edema; and no LE or oral petechiae  She has a subconjunctival hemorrhage in her L eye.    Laboratory testing showed her more anemic than previously but her white count and platelets are stable.   Her mild renal insufficiency is stable as well and her electrolytes are acceptable.  Her reticulocyte count is 88,000 and has been roughly stable over the last several years as its been monitored periodically.    She has had iron repletion parenterally and still takes B12 with confirmation that her serum B12 levels are acceptable.  She continues on Eliquis.    We will arrange a transfusion for her tomorrow or Saturday at [facility].  Type and screen testing has been done here at the [facility] and the blood orders are in place.      She knows to call the [facility] infusion center in the morning and confirm the timing.    Consent for transfusion orders will be signed and faxed to [facility] but her consent will need to be confirmed when she arrives for the transfusion.  She heard this in a followup call Friday AM.    She has not had known anti-Red cell antibodies in the past so our blood bank here tells me this is possible to do an electronic crossmatch since her antibody screen remains negative.    I suggested that she also get a CBC done in about 1- 2 weeks after the transfusion to see if her hemoglobin is stabilized and whether we have to pursue additional issues related to concerns about bleeding.    I made no recommendations or change in the plan to reinstitute immune suppression for her as her white count and platelets are stable and there are toxicities associated with the immunosuppression she has had in the past.    She is aware that I will not be seeing patients after June 30 but I will connect with her later this month and she will then be introduced to one of my benign hematology physician colleagues who will follow her subsequently.    Rafita Rogel  MD    Professor of medicine     Latest Reference Range & Units 03/03/22 11:06 04/14/22 14:53 06/02/22 17:41   Sodium 133 - 144 mmol/L 139 140 141   Potassium 3.4 - 5.3 mmol/L 4.5 3.8 3.9   Chloride 94 - 109 mmol/L 108 108 108   Carbon Dioxide 20 - 32 mmol/L 27 25 25   Urea Nitrogen 7 - 30 mg/dL 24 24 24   Creatinine 0.52 - 1.04 mg/dL 1.14 (H) 1.17 (H) 1.28 (H)   GFR Estimate >60 mL/min/1.73m2 49 (L) [1] 48 (L) [2] 43 (L) [3]   Calcium 8.5 - 10.1 mg/dL 8.7 8.6 8.4 (L)   Anion Gap 3 - 14 mmol/L 4 7 8   Magnesium 1.6 - 2.3 mg/dL 1.7  1.8   Albumin 3.4 - 5.0 g/dL 2.5 (L) 2.6 (L) 2.1 (L)   Protein Total 6.8 - 8.8 g/dL 7.0 7.5 7.1   Bilirubin Total 0.2 - 1.3 mg/dL 0.5 0.4 0.4   Alkaline Phosphatase 40 - 150 U/L 80 90 104   ALT 0 - 50 U/L 15 16 12   AST 0 - 45 U/L 14 16 14   Ferritin 8 - 252 ng/mL  107    Folate >=5.4 ng/mL  45.0 [4]    Iron 35 - 180 ug/dL  52    Iron Binding Cap 240 - 430 ug/dL  174 (L)    Iron Saturation Index 15 - 46 %  30    Vitamin B12 193 - 986 pg/mL  2,462 (H)    Glucose 70 - 99 mg/dL 108 (H) 127 (H) 101 (H)   WBC 4.0 - 11.0 10e3/uL 2.1 (L) 2.2 (L) 2.2 (L)   Hemoglobin 11.7 - 15.7 g/dL 7.7 (L) 8.3 (L) 6.5 (LL)   Hematocrit 35.0 - 47.0 % 26.3 (L) 27.6 (L) 22.7 (L)   Platelet Count 150 - 450 10e3/uL 180 112 (L) 121 (L)   RBC Count 3.80 - 5.20 10e6/uL 2.33 (L) 2.56 (L) 1.98 (L)   MCV 78 - 100 fL 113 (H) 108 (H) 115 (H)   MCH 26.5 - 33.0 pg 33.0 32.4 32.8   MCHC 31.5 - 36.5 g/dL 29.3 (L) 30.1 (L) 28.6 (L)   RDW 10.0 - 15.0 % 21.8 (H) 19.1 (H) 22.5 (H)   % Neutrophils % 49 37 31   % Lymphocytes % 38 44 52   % Monocytes % 13 19 14   % Eosinophils % 0 0 0   % Basophils % 0 0 1   Absolute Basophils 0.0 - 0.2 10e3/uL   0.0   Absolute Basophils 0.0 - 0.2 10e3/uL 0.0 0.0    NRBC/W <=0 % 3 (H) 1 (H)    Absolute Neutrophil 1.6 - 8.3 10e3/uL 1.0 (L) 0.8 (L)    Absolute Lymphocytes 0.8 - 5.3 10e3/uL 0.8 1.0    Absolute Monocytes 0.0 - 1.3 10e3/uL 0.3 0.4    Absolute Eosinophils 0.0 - 0.7 10e3/uL 0.0 0.0     Absolute Eosinophils 0.0 - 0.7 10e3/uL   0.0   Absolute Immature Granulocytes <=0.4 10e3/uL   0.1   Absolute Lymphocytes 0.8 - 5.3 10e3/uL   1.2   Absolute Monocytes 0.0 - 1.3 10e3/uL   0.3   % Immature Granulocytes %   2   Absolute Neutrophils 1.6 - 8.3 10e3/uL   0.7 (L)   Absolute NRBCs 10e3/uL   0.1   Absolute NRBCs <=0.0 10e3/uL 0.1 (H) 0.0    NRBCs per 100 WBC <1 /100   3 (H)   RBC Morphology  Confirmed RBC Indices Confirmed RBC Indices    Platelet Morphology Automated Count Confirmed. Platelet morphology is normal.  Automated Count Confirmed. Platelet morphology is normal. Automated Count Confirmed. Platelet morphology is normal.    Acanthocytes None Seen  Slight !     Target Cells None Seen  Slight ! Slight !    Elliptocytes None Seen   Slight !    Quang Cells None Seen   Slight !    % Retic 0.5 - 2.0 %  2.9 (H) 4.4 (H)   Absolute Retic 0.025 - 0.095 10e6/uL  0.074 0.088   INR 0.85 - 1.15  1.81 (H) 1.73 (H)    ABO/Rh(D)    A POS   Antibody Screen Negative    Negative   SPECIMEN EXPIRATION DATE    [id number]   (H): Data is abnormally high  (L): Data is abnormally low  !: Data is abnormal  (LL): Data is critically low  [1] Effective December 21, 2021 eGFRcr in adults is calculated using the 2021 CKD-EPI creatinine equation which includes age and gender ( et al., NEJ, DOI: 10.1056/KKHJsa8888114)  [2] Effective December 21, 2021 eGFRcr in adults is calculated using the 2021 CKD-EPI creatinine equation which includes age and gender ( et al., NEJ, DOI: 10.1056/KTKDvl6025443)  [3] Effective December 21, 2021 eGFRcr in adults is calculated using the 2021 CKD-EPI creatinine equation which includes age and gender (Mariah et al., [facility], DOI: 10.1056/[id number])  [4] Deficient: <3.4 ng/mL  Indeterminate: 3.4-5.4 ng/mL  Normal: > 5.4 ng/mL

## 2022-06-02 NOTE — NURSING NOTE
"Oncology Rooming Note    June 2, 2022 5:39 PM   Bonny Raymundo is a 76 year old female who presents for:    Chief Complaint   Patient presents with     Oncology Clinic Visit     Aplastic anemia     Initial Vitals: /74   Pulse 105   Temp 98  F (36.7  C) (Oral)   Wt 46.3 kg (102 lb)   SpO2 99%   BMI 18.66 kg/m   Estimated body mass index is 18.66 kg/m  as calculated from the following:    Height as of 4/28/22: 1.575 m (5' 2\").    Weight as of this encounter: 46.3 kg (102 lb). Body surface area is 1.42 meters squared.  Moderate Pain (5) Comment: Data Unavailable   No LMP recorded. Patient has had a hysterectomy.  Allergies reviewed: Yes  Medications reviewed: Yes    Medications: Medication refills not needed today.  Pharmacy name entered into [facility]:    [facility] [place] - [address], [address]  [facility] [place] [place] [place] [facility] [address] [id number] - [place] - [address] [facility]    Clinical concerns: none       Lottie Oshea CMA            "

## 2022-06-02 NOTE — NURSING NOTE
Chief Complaint   Patient presents with     Oncology Clinic Visit     Aplastic anemia     Labs drawn with  by rn.  Pt tolerated well.    Sajan Villagran RN

## 2022-06-02 NOTE — LETTER
6/2/2022         RE: Bonny Raymundo  [address]  [facility] [address]      /74   Pulse 105   Temp 98  F (36.7  C) (Oral)   Wt 46.3 kg (102 lb)   SpO2 99%   BMI 18.66 kg/m    Wt Readings from Last 4 Encounters:   06/02/22 46.3 kg (102 lb)   04/28/22 47.2 kg (104 lb)   04/14/22 48 kg (105 lb 14.4 oz)   01/21/22 50.3 kg (111 lb)     Bonny returns to follow-up her aplastic anemia.  She has been off immunosuppression for some time stopping CSA last year in May (for persisting renal insufficiency) and eltrombopag in June for abnormal LFTs.  She still has irritation and itching on her right forehead from her prior shingles and has generally felt more tired.  Sometimes when standing she has to stop to steady herself but is a bit uncertain to figure out how long that has been going on.  She has not had external bleeding or bruising.  She has not had recent fevers or chills or other symptoms seeming like infection.  Sometime ago she had a plugged left ear and her primary care office cleaned some wax out of it but that has not been as prominent recently.  She has had no rash or new areas of bone pain.  Mostly she is just been more tired.  She has no nausea but has episodic loose stools and has been taking 1 Imodium or a half a pill daily with reasonably good control of her symptoms.  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 plus radiographic findings of previous cerebrovascular disease and continues on eliquis.    Her exam shows her looking tired but in no acute distress.  She has lost 9 pounds in the last 6 months and her vital signs are acceptable.  She has no edema or focal bone tenderness.  She has no bruises or petechiae.  Her oropharynx is clear without mucosal lesions.  Both tympanic membranes are clear and neither ear was blocked with earwax.  Her lungs were clear without rales or wheezing; her  heart tones were quiet but regular.  Her abdomen was soft without hepatosplenomegaly tenderness or rebound.  SHe had no rash or LE edema; and no LE or oral petechiae  She has a subconjunctival hemorrhage in her L eye.    Laboratory testing showed her more anemic than previously but her white count and platelets are stable.   Her mild renal insufficiency is stable as well and her electrolytes are acceptable.  Her reticulocyte count is 88,000 and has been roughly stable over the last several years as its been monitored periodically.    She has had iron repletion parenterally and still takes B12 with confirmation that her serum B12 levels are acceptable.  She continues on Eliquis.    We will arrange a transfusion for her tomorrow or Saturday at [facility].  Type and screen testing has been done here at the [facility] and the blood orders are in place.      She knows to call the [facility] infusion center in the morning and confirm the timing.    Consent for transfusion orders will be signed and faxed to [facility] but her consent will need to be confirmed when she arrives for the transfusion.  She heard this in a followup call Friday AM.    She has not had known anti-Red cell antibodies in the past so our blood bank here tells me this is possible to do an electronic crossmatch since her antibody screen remains negative.    I suggested that she also get a CBC done in about 1- 2 weeks after the transfusion to see if her hemoglobin is stabilized and whether we have to pursue additional issues related to concerns about bleeding.    I made no recommendations or change in the plan to reinstitute immune suppression for her as her white count and platelets are stable and there are toxicities associated with the immunosuppression she has had in the past.    She is aware that I will not be seeing patients after June 30 but I will connect with her later this month and she will then be introduced to one of my benign  hematology physician colleagues who will follow her subsequently.    Rafita Rogel MD    Professor of medicine     Latest Reference Range & Units 03/03/22 11:06 04/14/22 14:53 06/02/22 17:41   Sodium 133 - 144 mmol/L 139 140 141   Potassium 3.4 - 5.3 mmol/L 4.5 3.8 3.9   Chloride 94 - 109 mmol/L 108 108 108   Carbon Dioxide 20 - 32 mmol/L 27 25 25   Urea Nitrogen 7 - 30 mg/dL 24 24 24   Creatinine 0.52 - 1.04 mg/dL 1.14 (H) 1.17 (H) 1.28 (H)   GFR Estimate >60 mL/min/1.73m2 49 (L) [1] 48 (L) [2] 43 (L) [3]   Calcium 8.5 - 10.1 mg/dL 8.7 8.6 8.4 (L)   Anion Gap 3 - 14 mmol/L 4 7 8   Magnesium 1.6 - 2.3 mg/dL 1.7  1.8   Albumin 3.4 - 5.0 g/dL 2.5 (L) 2.6 (L) 2.1 (L)   Protein Total 6.8 - 8.8 g/dL 7.0 7.5 7.1   Bilirubin Total 0.2 - 1.3 mg/dL 0.5 0.4 0.4   Alkaline Phosphatase 40 - 150 U/L 80 90 104   ALT 0 - 50 U/L 15 16 12   AST 0 - 45 U/L 14 16 14   Ferritin 8 - 252 ng/mL  107    Folate >=5.4 ng/mL  45.0 [4]    Iron 35 - 180 ug/dL  52    Iron Binding Cap 240 - 430 ug/dL  174 (L)    Iron Saturation Index 15 - 46 %  30    Vitamin B12 193 - 986 pg/mL  2,462 (H)    Glucose 70 - 99 mg/dL 108 (H) 127 (H) 101 (H)   WBC 4.0 - 11.0 10e3/uL 2.1 (L) 2.2 (L) 2.2 (L)   Hemoglobin 11.7 - 15.7 g/dL 7.7 (L) 8.3 (L) 6.5 (LL)   Hematocrit 35.0 - 47.0 % 26.3 (L) 27.6 (L) 22.7 (L)   Platelet Count 150 - 450 10e3/uL 180 112 (L) 121 (L)   RBC Count 3.80 - 5.20 10e6/uL 2.33 (L) 2.56 (L) 1.98 (L)   MCV 78 - 100 fL 113 (H) 108 (H) 115 (H)   MCH 26.5 - 33.0 pg 33.0 32.4 32.8   MCHC 31.5 - 36.5 g/dL 29.3 (L) 30.1 (L) 28.6 (L)   RDW 10.0 - 15.0 % 21.8 (H) 19.1 (H) 22.5 (H)   % Neutrophils % 49 37 31   % Lymphocytes % 38 44 52   % Monocytes % 13 19 14   % Eosinophils % 0 0 0   % Basophils % 0 0 1   Absolute Basophils 0.0 - 0.2 10e3/uL   0.0   Absolute Basophils 0.0 - 0.2 10e3/uL 0.0 0.0    NRBC/W <=0 % 3 (H) 1 (H)    Absolute Neutrophil 1.6 - 8.3 10e3/uL 1.0 (L) 0.8 (L)    Absolute Lymphocytes 0.8 - 5.3 10e3/uL 0.8 1.0    Absolute Monocytes  0.0 - 1.3 10e3/uL 0.3 0.4    Absolute Eosinophils 0.0 - 0.7 10e3/uL 0.0 0.0    Absolute Eosinophils 0.0 - 0.7 10e3/uL   0.0   Absolute Immature Granulocytes <=0.4 10e3/uL   0.1   Absolute Lymphocytes 0.8 - 5.3 10e3/uL   1.2   Absolute Monocytes 0.0 - 1.3 10e3/uL   0.3   % Immature Granulocytes %   2   Absolute Neutrophils 1.6 - 8.3 10e3/uL   0.7 (L)   Absolute NRBCs 10e3/uL   0.1   Absolute NRBCs <=0.0 10e3/uL 0.1 (H) 0.0    NRBCs per 100 WBC <1 /100   3 (H)   RBC Morphology  Confirmed RBC Indices Confirmed RBC Indices    Platelet Morphology Automated Count Confirmed. Platelet morphology is normal.  Automated Count Confirmed. Platelet morphology is normal. Automated Count Confirmed. Platelet morphology is normal.    Acanthocytes None Seen  Slight !     Target Cells None Seen  Slight ! Slight !    Elliptocytes None Seen   Slight !    Quang Cells None Seen   Slight !    % Retic 0.5 - 2.0 %  2.9 (H) 4.4 (H)   Absolute Retic 0.025 - 0.095 10e6/uL  0.074 0.088   INR 0.85 - 1.15  1.81 (H) 1.73 (H)    ABO/Rh(D)    A POS   Antibody Screen Negative    Negative   SPECIMEN EXPIRATION DATE    [id number]   (H): Data is abnormally high  (L): Data is abnormally low  !: Data is abnormal  (LL): Data is critically low  [1] Effective December 21, 2021 eGFRcr in adults is calculated using the 2021 CKD-EPI creatinine equation which includes age and gender ( et al., NEJM, DOI: 10.1056/AXIVko3308348)  [2] Effective December 21, 2021 eGFRcr in adults is calculated using the 2021 CKD-EPI creatinine equation which includes age and gender ( et al., NEJM, DOI: 10.1056/XRADhb9460004)  [3] Effective December 21, 2021 eGFRcr in adults is calculated using the 2021 CKD-EPI creatinine equation which includes age and gender ( et al., NEJM, DOI: 10.1056/VVFPib2350774)  [4] Deficient: <3.4 ng/mL  Indeterminate: 3.4-5.4 ng/mL  Normal: > 5.4 ng/mL        Rafita Rogel MD

## 2022-06-03 RX ORDER — HEPARIN SODIUM (PORCINE) LOCK FLUSH IV SOLN 100 UNIT/ML 100 UNIT/ML
5 SOLUTION INTRAVENOUS
Status: CANCELLED | OUTPATIENT
Start: 2022-06-03

## 2022-06-03 RX ORDER — HEPARIN SODIUM,PORCINE 10 UNIT/ML
5 VIAL (ML) INTRAVENOUS
Status: CANCELLED | OUTPATIENT
Start: 2022-06-03

## 2022-06-04 ENCOUNTER — INFUSION THERAPY VISIT (OUTPATIENT)
Dept: INFUSION THERAPY | Facility: CLINIC | Age: 77
End: 2022-06-04
Attending: INTERNAL MEDICINE
Payer: COMMERCIAL

## 2022-06-04 VITALS
DIASTOLIC BLOOD PRESSURE: 78 MMHG | SYSTOLIC BLOOD PRESSURE: 128 MMHG | TEMPERATURE: 98.4 F | RESPIRATION RATE: 16 BRPM | HEART RATE: 66 BPM

## 2022-06-04 DIAGNOSIS — D61.9 APLASTIC ANEMIA (H): Primary | ICD-10-CM

## 2022-06-04 DIAGNOSIS — D61.818 PANCYTOPENIA (H): ICD-10-CM

## 2022-06-04 LAB
BLD PROD TYP BPU: NORMAL
BLOOD COMPONENT TYPE: NORMAL
CODING SYSTEM: NORMAL
CROSSMATCH: NORMAL
ISSUE DATE AND TIME: NORMAL
UNIT ABO/RH: NORMAL
UNIT NUMBER: NORMAL
UNIT STATUS: NORMAL
UNIT TYPE ISBT: 6200

## 2022-06-04 PROCEDURE — P9016 RBC LEUKOCYTES REDUCED: HCPCS

## 2022-06-04 PROCEDURE — 86923 COMPATIBILITY TEST ELECTRIC: CPT

## 2022-06-04 PROCEDURE — 36430 TRANSFUSION BLD/BLD COMPNT: CPT

## 2022-06-04 RX ORDER — HEPARIN SODIUM,PORCINE 10 UNIT/ML
5 VIAL (ML) INTRAVENOUS
Status: DISCONTINUED | OUTPATIENT
Start: 2022-06-04 | End: 2022-06-04 | Stop reason: HOSPADM

## 2022-06-04 RX ORDER — HEPARIN SODIUM (PORCINE) LOCK FLUSH IV SOLN 100 UNIT/ML 100 UNIT/ML
5 SOLUTION INTRAVENOUS
Status: DISCONTINUED | OUTPATIENT
Start: 2022-06-04 | End: 2022-06-04 | Stop reason: HOSPADM

## 2022-06-04 NOTE — PROGRESS NOTES
Infusion Nursing Note:  Bonny Raymundo presents today for 1 unit pRBC's.    Patient seen by provider today: No   present during visit today: Not Applicable.    Note: N/A.      Intravenous Access:  Peripheral IV placed.    Treatment Conditions:  Lab Results   Component Value Date    HGB 6.5 (LL) 06/02/2022    WBC 2.2 (L) 06/02/2022    ANEU 0.8 (L) 04/14/2022    ANEUTAUTO 0.7 (L) 06/02/2022     (L) 06/02/2022      Blood transfusion consent signed 6/4/22.      Post Infusion Assessment:  Patient tolerated infusion without incident.  Blood return noted pre and post infusion.  Site patent and intact, free from redness, edema or discomfort.  No evidence of extravasations.  Access discontinued per protocol.       Discharge Plan:   Discharge instructions reviewed with: Patient.  Patient and/or family verbalized understanding of discharge instructions and all questions answered.  AVS to patient via MYCHART.  Patient will return PRN for next appointment.   Patient discharged in stable condition accompanied by: self.  Departure Mode: Ambulatory.      Shant Nieto RN

## 2022-06-13 DIAGNOSIS — K21.9 GASTROESOPHAGEAL REFLUX DISEASE WITHOUT ESOPHAGITIS: ICD-10-CM

## 2022-06-14 RX ORDER — PANTOPRAZOLE SODIUM 20 MG/1
TABLET, DELAYED RELEASE ORAL
Qty: 90 TABLET | Refills: 11 | Status: SHIPPED | OUTPATIENT
Start: 2022-06-14 | End: 2023-01-01

## 2022-06-14 NOTE — CONFIDENTIAL NOTE
pantoprazole (PROTONIX) Refill   Last prescribing provider: Dr Rogel     Last clinic visit date: 6/2/22 Dr Rogel     Any missed appointments or no-shows since last clinic visit?: N/A    Recommendations for requested medication (if none, N/A): N/A    Next clinic visit date: 7/20/22 Dr Spann     Any other pertinent information (if none, N/A): N/A

## 2022-06-16 ENCOUNTER — LAB (OUTPATIENT)
Dept: LAB | Facility: CLINIC | Age: 77
End: 2022-06-16
Attending: INTERNAL MEDICINE
Payer: COMMERCIAL

## 2022-06-16 ENCOUNTER — PATIENT OUTREACH (OUTPATIENT)
Dept: ONCOLOGY | Facility: CLINIC | Age: 77
End: 2022-06-16

## 2022-06-16 ENCOUNTER — OFFICE VISIT (OUTPATIENT)
Dept: OPHTHALMOLOGY | Facility: CLINIC | Age: 77
End: 2022-06-16
Attending: OPHTHALMOLOGY
Payer: COMMERCIAL

## 2022-06-16 DIAGNOSIS — D61.3 IDIOPATHIC APLASTIC ANEMIA (H): ICD-10-CM

## 2022-06-16 DIAGNOSIS — H35.371 EPIRETINAL MEMBRANE (ERM) OF RIGHT EYE: Primary | ICD-10-CM

## 2022-06-16 LAB
ALBUMIN SERPL-MCNC: 2.2 G/DL (ref 3.4–5)
ALP SERPL-CCNC: 101 U/L (ref 40–150)
ALT SERPL W P-5'-P-CCNC: 11 U/L (ref 0–50)
ANION GAP SERPL CALCULATED.3IONS-SCNC: 8 MMOL/L (ref 3–14)
AST SERPL W P-5'-P-CCNC: 13 U/L (ref 0–45)
BASOPHILS # BLD MANUAL: 0 10E3/UL (ref 0–0.2)
BASOPHILS NFR BLD MANUAL: 0 %
BILIRUB SERPL-MCNC: 0.4 MG/DL (ref 0.2–1.3)
BUN SERPL-MCNC: 28 MG/DL (ref 7–30)
BURR CELLS BLD QL SMEAR: SLIGHT
CALCIUM SERPL-MCNC: 8.6 MG/DL (ref 8.5–10.1)
CHLORIDE BLD-SCNC: 105 MMOL/L (ref 94–109)
CO2 SERPL-SCNC: 25 MMOL/L (ref 20–32)
CREAT SERPL-MCNC: 1.29 MG/DL (ref 0.52–1.04)
ELLIPTOCYTES BLD QL SMEAR: SLIGHT
EOSINOPHIL # BLD MANUAL: 0 10E3/UL (ref 0–0.7)
EOSINOPHIL NFR BLD MANUAL: 1 %
ERYTHROCYTE [DISTWIDTH] IN BLOOD BY AUTOMATED COUNT: 21.1 % (ref 10–15)
GFR SERPL CREATININE-BSD FRML MDRD: 42 ML/MIN/1.73M2
GLUCOSE BLD-MCNC: 108 MG/DL (ref 70–99)
HCT VFR BLD AUTO: 26.4 % (ref 35–47)
HGB BLD-MCNC: 7.5 G/DL (ref 11.7–15.7)
LYMPHOCYTES # BLD MANUAL: 0.7 10E3/UL (ref 0.8–5.3)
LYMPHOCYTES NFR BLD MANUAL: 29 %
MCH RBC QN AUTO: 31.3 PG (ref 26.5–33)
MCHC RBC AUTO-ENTMCNC: 28.4 G/DL (ref 31.5–36.5)
MCV RBC AUTO: 110 FL (ref 78–100)
MONOCYTES # BLD MANUAL: 0.5 10E3/UL (ref 0–1.3)
MONOCYTES NFR BLD MANUAL: 19 %
NEUTROPHILS # BLD MANUAL: 1.2 10E3/UL (ref 1.6–8.3)
NEUTROPHILS NFR BLD MANUAL: 48 %
NRBC # BLD AUTO: 0.1 10E3/UL
NRBC BLD MANUAL-RTO: 4 %
OTHER CELLS # BLD MANUAL: 0.1 10E3/UL
OTHER CELLS NFR BLD MANUAL: 3 %
PATH REV: ABNORMAL
PLAT MORPH BLD: ABNORMAL
PLATELET # BLD AUTO: 106 10E3/UL (ref 150–450)
POTASSIUM BLD-SCNC: 3.6 MMOL/L (ref 3.4–5.3)
PROT SERPL-MCNC: 7 G/DL (ref 6.8–8.8)
RBC # BLD AUTO: 2.4 10E6/UL (ref 3.8–5.2)
RBC MORPH BLD: ABNORMAL
SODIUM SERPL-SCNC: 138 MMOL/L (ref 133–144)
WBC # BLD AUTO: 2.4 10E3/UL (ref 4–11)

## 2022-06-16 PROCEDURE — 99000 SPECIMEN HANDLING OFFICE-LAB: CPT | Performed by: PATHOLOGY

## 2022-06-16 PROCEDURE — 36415 COLL VENOUS BLD VENIPUNCTURE: CPT | Performed by: PATHOLOGY

## 2022-06-16 PROCEDURE — G0463 HOSPITAL OUTPT CLINIC VISIT: HCPCS | Mod: 25

## 2022-06-16 PROCEDURE — 99213 OFFICE O/P EST LOW 20 MIN: CPT | Performed by: OPHTHALMOLOGY

## 2022-06-16 PROCEDURE — 92134 CPTRZ OPH DX IMG PST SGM RTA: CPT | Performed by: OPHTHALMOLOGY

## 2022-06-16 PROCEDURE — 85007 BL SMEAR W/DIFF WBC COUNT: CPT | Mod: 90 | Performed by: PATHOLOGY

## 2022-06-16 PROCEDURE — 80053 COMPREHEN METABOLIC PANEL: CPT | Performed by: PATHOLOGY

## 2022-06-16 PROCEDURE — 85027 COMPLETE CBC AUTOMATED: CPT | Performed by: PATHOLOGY

## 2022-06-16 ASSESSMENT — CONF VISUAL FIELD
OS_SUPERIOR_NASAL_RESTRICTION: 3
OD_INFERIOR_NASAL_RESTRICTION: 1
OS_SUPERIOR_TEMPORAL_RESTRICTION: 3
OS_INFERIOR_NASAL_RESTRICTION: 1
OD_INFERIOR_TEMPORAL_RESTRICTION: 2
OD_SUPERIOR_TEMPORAL_RESTRICTION: 3
OD_SUPERIOR_NASAL_RESTRICTION: 3
OS_INFERIOR_TEMPORAL_RESTRICTION: 3

## 2022-06-16 ASSESSMENT — VISUAL ACUITY
OS_CC: 20/20
METHOD: SNELLEN - LINEAR
OS_CC+: -2
CORRECTION_TYPE: GLASSES
OD_CC+: -2
OD_CC: 20/25

## 2022-06-16 ASSESSMENT — CUP TO DISC RATIO
OD_RATIO: 0.4
OS_RATIO: 0.4

## 2022-06-16 ASSESSMENT — EXTERNAL EXAM - RIGHT EYE: OD_EXAM: NORMAL

## 2022-06-16 ASSESSMENT — TONOMETRY
IOP_METHOD: TONOPEN
OD_IOP_MMHG: 11
OS_IOP_MMHG: 12

## 2022-06-16 ASSESSMENT — SLIT LAMP EXAM - LIDS
COMMENTS: NORMAL
COMMENTS: NORMAL

## 2022-06-16 NOTE — PROGRESS NOTES
CC: retina follow up   HPI: Bonny Raymundo is a 76 year old with history of intraocular lens dislocation; diplopia and Epiretinal membrane.  She still has mild diplopia that corrects with current glasses and prism. No changes in vision; no flashes and floaters     Ocular History: S/p dislocated intraocular lens right eye secondary intraocular lens right eye 2.2.15 Dr. Evert HEWITTIOL each eye, Dr. Snow 30-40 years ago  Lower lid blepharoplasty    Optical Coherence Tomography:11/04/21    right eye normal, tr erm  Left eye normal    CN 2-12 intact each eye     Impression   # Myopic fundus  Retina detachment precautions were discussed with the patient (presence or increased in flashes, floaters or a curtain in the visual field) and was asked to return if any of the those occur     # status post 23g Pars plana vitrectomy (PPV) removal  Of Dislocated right eye and secondary intraocular lens placement 2.2.15 right eye (SM) scleral fixated intraocular lens [id number] 16.5 D  Intraocular lens in good position  Retina attached, doing well    # Cataract surgery (1980's) both eyes,   #History of Lower lid blepharoplasty  # Trace Epiretinal membrane right eye- not visually significant -- observe    # history of Binocular diplopia  - Small angle esotropia and hypertropia  Uses prisms   Reports had gotten worse in the past few months  could be due to Heavy Eye vs. Sagging Eye syndrome?  Recommend neuro-op consult    # history of Herpes zoster virus R side of the forehead   Patient reports had Herpes zoster virus dec last year   Currently still experiencing some redness; occasional itchiness and occasional pain   possible post-herpetic neuralgia  On exam good cornea sensation. Punctate epithelial erosions   Recommend artificial tears  Four times a day    Recommend follow up with neurology   Follow up neuro-oph next available    Plan:    Follow up with retina in 1 yr with optos and Optical Coherence Tomography   Follow up  neuro-oph next available- management of diplopia- update prism and possible post-herpetic neuralgia      ~~~~~~~~~~~~~~~~~~~~~~~~~~~~~~~~~~   Complete documentation of historical and exam elements from today's encounter can be found in the full encounter summary report (not reduplicated in this progress note).  I personally obtained the chief complaint(s) and history of present illness.  I confirmed and edited as necessary the review of systems, past medical/surgical history, family history, social history, and examination findings as documented by others; and I examined the patient myself.  I personally reviewed the relevant tests, images, and reports as documented above.  I formulated and edited as necessary the assessment and plan and discussed the findings and management plan with the patient and family    Racheal Loyd MD   of Ophthalmology.  Retina Service   Department of Ophthalmology and Visual Neurosciences   [facility]  Phone: [phone]   Fax: [phone]

## 2022-06-16 NOTE — NURSING NOTE
Chief Complaints and History of Present Illnesses   Patient presents with     Vision Changes Od     Chief Complaint(s) and History of Present Illness(es)     Vision Changes Od     Onset: sudden    Onset: 2 weeks ago    Quality: blurred vision, spots in vision, flashing lights and fluctuating    Frequency: constantly    Timing: throughout the day    Course: stable    Associated symptoms: double vision, flashes and floaters.  Negative for eye pain and itching    Pain scale: 0/10              Comments     About two weeks ago, about , I noted sudden decrease of VA in RE along with flashes/floaters. 'Woke up and couldn't see. I'm not driving. I seem to have VF diminished in right side of RE. I am also having trouble walking, feels like I want to veer to the left side. I've been holding on to the furniture so I don't fall. All these things started a few days after my dog , so I'm wondering if this is stress related. I have history of double vision, but it has been worse. I also still have shingles over my right eye, and it encroached down further to my eye. The shingles appeared in late December. I haven't had any open sores.'  Patients states no problems with speaking or swallowing.    Alanna Mata, COT COT 11:06 AM 2022

## 2022-06-16 NOTE — PROGRESS NOTES
Lab states 3 cells in Pt's labs looked abnormal and sample was being routed to pathology for further review.

## 2022-06-18 DIAGNOSIS — D61.9 APLASTIC ANEMIA (H): Primary | ICD-10-CM

## 2022-06-18 NOTE — PROGRESS NOTES
possible atypical cells on blood smear  will get flow with next blood draw in a week or so.    CRISTOBAL Rogel

## 2022-06-27 ENCOUNTER — PATIENT OUTREACH (OUTPATIENT)
Dept: ONCOLOGY | Facility: CLINIC | Age: 77
End: 2022-06-27

## 2022-06-27 ENCOUNTER — LAB (OUTPATIENT)
Dept: INFUSION THERAPY | Facility: CLINIC | Age: 77
End: 2022-06-27
Attending: INTERNAL MEDICINE
Payer: COMMERCIAL

## 2022-06-27 DIAGNOSIS — D61.9 APLASTIC ANEMIA (H): ICD-10-CM

## 2022-06-27 DIAGNOSIS — D61.9 APLASTIC ANEMIA (H): Primary | ICD-10-CM

## 2022-06-27 LAB
ABO/RH(D): NORMAL
ALBUMIN SERPL-MCNC: 2.1 G/DL (ref 3.4–5)
ALP SERPL-CCNC: 93 U/L (ref 40–150)
ALT SERPL W P-5'-P-CCNC: 14 U/L (ref 0–50)
ANION GAP SERPL CALCULATED.3IONS-SCNC: 2 MMOL/L (ref 3–14)
ANTIBODY SCREEN: NEGATIVE
AST SERPL W P-5'-P-CCNC: 20 U/L (ref 0–45)
BASOPHILS # BLD AUTO: 0 10E3/UL (ref 0–0.2)
BASOPHILS NFR BLD AUTO: 0 %
BILIRUB SERPL-MCNC: 0.4 MG/DL (ref 0.2–1.3)
BLD PROD TYP BPU: NORMAL
BLOOD COMPONENT TYPE: NORMAL
BUN SERPL-MCNC: 21 MG/DL (ref 7–30)
CALCIUM SERPL-MCNC: 8.7 MG/DL (ref 8.5–10.1)
CHLORIDE BLD-SCNC: 107 MMOL/L (ref 94–109)
CO2 SERPL-SCNC: 28 MMOL/L (ref 20–32)
CODING SYSTEM: NORMAL
CREAT SERPL-MCNC: 1.22 MG/DL (ref 0.52–1.04)
CROSSMATCH: NORMAL
ELLIPTOCYTES BLD QL SMEAR: SLIGHT
EOSINOPHIL # BLD AUTO: 0 10E3/UL (ref 0–0.7)
EOSINOPHIL NFR BLD AUTO: 1 %
ERYTHROCYTE [DISTWIDTH] IN BLOOD BY AUTOMATED COUNT: 21.5 % (ref 10–15)
GFR SERPL CREATININE-BSD FRML MDRD: 45 ML/MIN/1.73M2
GLUCOSE BLD-MCNC: 107 MG/DL (ref 70–99)
HCT VFR BLD AUTO: 23.8 % (ref 35–47)
HGB BLD-MCNC: 6.8 G/DL (ref 11.7–15.7)
IMM GRANULOCYTES # BLD: 0.1 10E3/UL
IMM GRANULOCYTES NFR BLD: 2 %
LYMPHOCYTES # BLD AUTO: 0.9 10E3/UL (ref 0.8–5.3)
LYMPHOCYTES NFR BLD AUTO: 37 %
MCH RBC QN AUTO: 31.5 PG (ref 26.5–33)
MCHC RBC AUTO-ENTMCNC: 28.6 G/DL (ref 31.5–36.5)
MCV RBC AUTO: 110 FL (ref 78–100)
MONOCYTES # BLD AUTO: 0.6 10E3/UL (ref 0–1.3)
MONOCYTES NFR BLD AUTO: 22 %
NEUTROPHILS # BLD AUTO: 1 10E3/UL (ref 1.6–8.3)
NEUTROPHILS NFR BLD AUTO: 38 %
NRBC # BLD AUTO: 0 10E3/UL
NRBC BLD AUTO-RTO: 1 /100
PLAT MORPH BLD: ABNORMAL
PLATELET # BLD AUTO: 111 10E3/UL (ref 150–450)
POTASSIUM BLD-SCNC: 4.6 MMOL/L (ref 3.4–5.3)
PROT SERPL-MCNC: 6.9 G/DL (ref 6.8–8.8)
RBC # BLD AUTO: 2.16 10E6/UL (ref 3.8–5.2)
RBC MORPH BLD: ABNORMAL
RETICS # AUTO: 0.08 10E6/UL (ref 0.03–0.1)
RETICS/RBC NFR AUTO: 3.6 % (ref 0.5–2)
SODIUM SERPL-SCNC: 137 MMOL/L (ref 133–144)
SPECIMEN EXPIRATION DATE: NORMAL
UNIT ABO/RH: NORMAL
UNIT NUMBER: NORMAL
UNIT STATUS: NORMAL
UNIT TYPE ISBT: 6200
WBC # BLD AUTO: 2.6 10E3/UL (ref 4–11)

## 2022-06-27 PROCEDURE — 86850 RBC ANTIBODY SCREEN: CPT | Performed by: INTERNAL MEDICINE

## 2022-06-27 PROCEDURE — 85025 COMPLETE CBC W/AUTO DIFF WBC: CPT | Performed by: INTERNAL MEDICINE

## 2022-06-27 PROCEDURE — 86923 COMPATIBILITY TEST ELECTRIC: CPT | Performed by: INTERNAL MEDICINE

## 2022-06-27 PROCEDURE — 86923 COMPATIBILITY TEST ELECTRIC: CPT

## 2022-06-27 PROCEDURE — 86901 BLOOD TYPING SEROLOGIC RH(D): CPT | Performed by: INTERNAL MEDICINE

## 2022-06-27 PROCEDURE — 85045 AUTOMATED RETICULOCYTE COUNT: CPT | Performed by: INTERNAL MEDICINE

## 2022-06-27 PROCEDURE — 80053 COMPREHEN METABOLIC PANEL: CPT | Performed by: INTERNAL MEDICINE

## 2022-06-27 PROCEDURE — 36415 COLL VENOUS BLD VENIPUNCTURE: CPT

## 2022-06-27 RX ORDER — HEPARIN SODIUM,PORCINE 10 UNIT/ML
5 VIAL (ML) INTRAVENOUS
Status: CANCELLED | OUTPATIENT
Start: 2022-06-27

## 2022-06-27 RX ORDER — HEPARIN SODIUM (PORCINE) LOCK FLUSH IV SOLN 100 UNIT/ML 100 UNIT/ML
5 SOLUTION INTRAVENOUS
Status: CANCELLED | OUTPATIENT
Start: 2022-06-27

## 2022-06-27 NOTE — PROGRESS NOTES
DATE:  6/27/2022   TIME OF RECEIPT FROM LAB:  0136 PM  LAB TEST:  Hgb  LAB VALUE:  6.8   RESULTS GIVEN WITH READ-BACK TO (PROVIDER):   Called [facility] triage and will route to pool   TIME LAB VALUE REPORTED TO PROVIDER:   0138 PM     Received call from lab regarding critical lab result on [facility]  Patient. Lab drawn at [facility].

## 2022-06-27 NOTE — PROGRESS NOTES
1349 Paged Dr. Rogel    Per Therapy plan meet parameters for RBC transfusion          1400 Per BMT char nurse Ana Maria, [facility] able to add Type & Screen today so able to take pt tomorrow 6/28/22  at 1pm for 1 unit of RBC transfusion    1405 This writer spoke to Bonny, would prefer to have transfusion at [facility]     1612 This Writer spoke to Jyoti Baugh RN from [place], no transfusion appts until  Sunday so would recommend pt keep appt at [facility] with BMT for tomorrow 6/28/22 at 1pm.     1416 Pt is resistant to want to go to [facility] for transfusion, wondering if can wait until Gage 7/3 but if needs will come in tomorrow 6/28/22 for 1 unit of RBC     1425 This writer spoke to care team, recommendation is transfusion tomorrow 6/28 or risk possible ED visit if starts to become symptomatic.     1430 Per Bonny, in agreement with transfusion plan for tomorrow 1pm at BMT clinic.

## 2022-06-27 NOTE — PROGRESS NOTES
Dr. Rogel returning call from Lana about transfusion. Okay for pt to have transfusion tomorrow. Does not need to go to ED today/tonight.     Pt is currently scheduled for a transfusion at [facility] tomorrow which pt is aware of and in agreement to come to.

## 2022-06-28 ENCOUNTER — INFUSION THERAPY VISIT (OUTPATIENT)
Dept: TRANSPLANT | Facility: CLINIC | Age: 77
End: 2022-06-28
Attending: INTERNAL MEDICINE
Payer: COMMERCIAL

## 2022-06-28 VITALS
RESPIRATION RATE: 16 BRPM | TEMPERATURE: 97.8 F | SYSTOLIC BLOOD PRESSURE: 147 MMHG | HEART RATE: 82 BPM | OXYGEN SATURATION: 98 % | DIASTOLIC BLOOD PRESSURE: 80 MMHG

## 2022-06-28 DIAGNOSIS — D61.818 PANCYTOPENIA (H): ICD-10-CM

## 2022-06-28 DIAGNOSIS — D61.9 APLASTIC ANEMIA (H): Primary | ICD-10-CM

## 2022-06-28 PROCEDURE — 36430 TRANSFUSION BLD/BLD COMPNT: CPT

## 2022-06-28 PROCEDURE — P9016 RBC LEUKOCYTES REDUCED: HCPCS

## 2022-06-28 ASSESSMENT — PAIN SCALES - GENERAL: PAINLEVEL: NO PAIN (0)

## 2022-06-28 NOTE — LETTER
6/28/2022         RE: Bonny Raymundo  [address]  [facility] [address]        Dear Colleague,    Thank you for referring your patient, Bonny Raymundo, to the [facility] BLOOD AND MARROW TRANSPLANT PROGRAM [place]. Please see a copy of my visit note below.    Infusion Nursing Note:  Bonny Raymundo presents today for scheduled RBC transfusion.    Patient seen by provider today: No   present during visit today: Not Applicable.    Note: Pt to receive 1u PRBCs today per labs drawn yesterday 6/27. Consent in chart. Vitals stable throughout. Pt tolerated infusion without side effect or symptoms.     Intravenous Access:  Peripheral IV placed.    Treatment Conditions:  Lab Results   Component Value Date    HGB 6.8 (LL) 06/27/2022    WBC 2.6 (L) 06/27/2022    ANEU 1.2 (L) 06/16/2022    ANEUTAUTO 1.0 (L) 06/27/2022     (L) 06/27/2022        Post Infusion Assessment:  Patient tolerated infusion without incident.  Blood return noted pre and post infusion.  Site patent and intact, free from redness, edema or discomfort.  No evidence of extravasations.  Access discontinued per protocol.     Discharge Plan:   Patient discharged in stable condition accompanied by: self.  Departure Mode: Ambulatory.      Ana Maria Durand RN                          Again, thank you for allowing me to participate in the care of your patient.        Sincerely,        [facility]

## 2022-06-28 NOTE — PROGRESS NOTES
Infusion Nursing Note:  Bonny Raymundo presents today for scheduled RBC transfusion.    Patient seen by provider today: No   present during visit today: Not Applicable.    Note: Pt to receive 1u PRBCs today per labs drawn yesterday 6/27. Consent in chart. Vitals stable throughout. Pt tolerated infusion without side effect or symptoms.     Intravenous Access:  Peripheral IV placed.    Treatment Conditions:  Lab Results   Component Value Date    HGB 6.8 (LL) 06/27/2022    WBC 2.6 (L) 06/27/2022    ANEU 1.2 (L) 06/16/2022    ANEUTAUTO 1.0 (L) 06/27/2022     (L) 06/27/2022        Post Infusion Assessment:  Patient tolerated infusion without incident.  Blood return noted pre and post infusion.  Site patent and intact, free from redness, edema or discomfort.  No evidence of extravasations.  Access discontinued per protocol.     Discharge Plan:   Patient discharged in stable condition accompanied by: self.  Departure Mode: Ambulatory.      Ana Maria Durand RN

## 2022-07-01 ENCOUNTER — PATIENT OUTREACH (OUTPATIENT)
Dept: ONCOLOGY | Facility: CLINIC | Age: 77
End: 2022-07-01

## 2022-07-01 NOTE — PROGRESS NOTES
"Called Pt to relay, Per Dr. Rogel, that \"she still has suppressed RBC production but stable and safe WBC and plts.  She still needs to check her counts every 2 weeks to see if she needs additional transfusions.Should get labs around July 10. CBCdp and comp metabolic and reticulocytes. make sure she knows she has appt with her new MD July 20;   Dr Spann\"  Pt verbalized understanding.    "

## 2022-07-08 ENCOUNTER — PATIENT OUTREACH (OUTPATIENT)
Dept: ONCOLOGY | Facility: CLINIC | Age: 77
End: 2022-07-08

## 2022-07-08 ENCOUNTER — LAB (OUTPATIENT)
Dept: INFUSION THERAPY | Facility: CLINIC | Age: 77
End: 2022-07-08
Attending: INTERNAL MEDICINE
Payer: COMMERCIAL

## 2022-07-08 DIAGNOSIS — D61.3 IDIOPATHIC APLASTIC ANEMIA (H): ICD-10-CM

## 2022-07-08 LAB
ALBUMIN SERPL-MCNC: 2.2 G/DL (ref 3.4–5)
ALP SERPL-CCNC: 95 U/L (ref 40–150)
ALT SERPL W P-5'-P-CCNC: 12 U/L (ref 0–50)
ANION GAP SERPL CALCULATED.3IONS-SCNC: 6 MMOL/L (ref 3–14)
AST SERPL W P-5'-P-CCNC: 15 U/L (ref 0–45)
BASOPHILS # BLD MANUAL: 0 10E3/UL (ref 0–0.2)
BASOPHILS NFR BLD MANUAL: 0 %
BILIRUB SERPL-MCNC: 0.5 MG/DL (ref 0.2–1.3)
BUN SERPL-MCNC: 25 MG/DL (ref 7–30)
CALCIUM SERPL-MCNC: 8.9 MG/DL (ref 8.5–10.1)
CHLORIDE BLD-SCNC: 108 MMOL/L (ref 94–109)
CO2 SERPL-SCNC: 26 MMOL/L (ref 20–32)
CREAT SERPL-MCNC: 1.26 MG/DL (ref 0.52–1.04)
ELLIPTOCYTES BLD QL SMEAR: SLIGHT
EOSINOPHIL # BLD MANUAL: 0 10E3/UL (ref 0–0.7)
EOSINOPHIL NFR BLD MANUAL: 0 %
ERYTHROCYTE [DISTWIDTH] IN BLOOD BY AUTOMATED COUNT: 21.2 % (ref 10–15)
GFR SERPL CREATININE-BSD FRML MDRD: 43 ML/MIN/1.73M2
GLUCOSE BLD-MCNC: 106 MG/DL (ref 70–99)
HCT VFR BLD AUTO: 26 % (ref 35–47)
HGB BLD-MCNC: 7.6 G/DL (ref 11.7–15.7)
LYMPHOCYTES # BLD MANUAL: 0.6 10E3/UL (ref 0.8–5.3)
LYMPHOCYTES NFR BLD MANUAL: 27 %
MCH RBC QN AUTO: 30.8 PG (ref 26.5–33)
MCHC RBC AUTO-ENTMCNC: 29.2 G/DL (ref 31.5–36.5)
MCV RBC AUTO: 105 FL (ref 78–100)
MONOCYTES # BLD MANUAL: 0.3 10E3/UL (ref 0–1.3)
MONOCYTES NFR BLD MANUAL: 15 %
NEUTROPHILS # BLD MANUAL: 1.3 10E3/UL (ref 1.6–8.3)
NEUTROPHILS NFR BLD MANUAL: 58 %
NRBC # BLD AUTO: 0 10E3/UL
NRBC BLD MANUAL-RTO: 2 %
PLAT MORPH BLD: ABNORMAL
PLATELET # BLD AUTO: 116 10E3/UL (ref 150–450)
POTASSIUM BLD-SCNC: 4.3 MMOL/L (ref 3.4–5.3)
PROT SERPL-MCNC: 7.3 G/DL (ref 6.8–8.8)
RBC # BLD AUTO: 2.47 10E6/UL (ref 3.8–5.2)
RBC MORPH BLD: ABNORMAL
RETICS # AUTO: 0.09 10E6/UL (ref 0.03–0.1)
RETICS/RBC NFR AUTO: 3.6 % (ref 0.5–2)
SODIUM SERPL-SCNC: 140 MMOL/L (ref 133–144)
TARGETS BLD QL SMEAR: SLIGHT
WBC # BLD AUTO: 2.3 10E3/UL (ref 4–11)

## 2022-07-08 PROCEDURE — 36415 COLL VENOUS BLD VENIPUNCTURE: CPT

## 2022-07-08 PROCEDURE — 80053 COMPREHEN METABOLIC PANEL: CPT | Performed by: INTERNAL MEDICINE

## 2022-07-08 PROCEDURE — 85007 BL SMEAR W/DIFF WBC COUNT: CPT | Performed by: INTERNAL MEDICINE

## 2022-07-08 PROCEDURE — 85045 AUTOMATED RETICULOCYTE COUNT: CPT | Performed by: INTERNAL MEDICINE

## 2022-07-08 PROCEDURE — 85027 COMPLETE CBC AUTOMATED: CPT | Performed by: INTERNAL MEDICINE

## 2022-07-08 NOTE — PROGRESS NOTES
[facility]: Cancer Care                                                                                          Left message for patient to call the office.  Hgb above 7 and no need for transfusion on Sunday.  Verified with DELGADO Lopez who was the coordinator with Dr. Rogel.  She will establish with Dr. Spann on 7 /20.  Will also send mychart to patient.  It looks like Sunday appt has already been cancelled by someone else.      Signature:  Sue Marquez RN

## 2022-07-19 ENCOUNTER — DOCUMENTATION ONLY (OUTPATIENT)
Dept: TRANSPLANT | Facility: CLINIC | Age: 77
End: 2022-07-19

## 2022-07-19 DIAGNOSIS — D64.9 ANEMIA: Primary | ICD-10-CM

## 2022-07-20 ENCOUNTER — APPOINTMENT (OUTPATIENT)
Dept: LAB | Facility: CLINIC | Age: 77
End: 2022-07-20
Attending: INTERNAL MEDICINE
Payer: COMMERCIAL

## 2022-07-20 ENCOUNTER — ONCOLOGY VISIT (OUTPATIENT)
Dept: ONCOLOGY | Facility: CLINIC | Age: 77
End: 2022-07-20
Attending: INTERNAL MEDICINE
Payer: COMMERCIAL

## 2022-07-20 VITALS
OXYGEN SATURATION: 97 % | TEMPERATURE: 98 F | RESPIRATION RATE: 20 BRPM | WEIGHT: 98 LBS | HEART RATE: 94 BPM | BODY MASS INDEX: 17.92 KG/M2 | DIASTOLIC BLOOD PRESSURE: 64 MMHG | SYSTOLIC BLOOD PRESSURE: 107 MMHG

## 2022-07-20 DIAGNOSIS — N18.4 CKD (CHRONIC KIDNEY DISEASE) STAGE 4, GFR 15-29 ML/MIN (H): ICD-10-CM

## 2022-07-20 DIAGNOSIS — D61.9 APLASTIC ANEMIA (H): ICD-10-CM

## 2022-07-20 DIAGNOSIS — Z86.718 HISTORY OF DEEP VENOUS THROMBOSIS: Primary | ICD-10-CM

## 2022-07-20 DIAGNOSIS — R63.30 FEEDING DIFFICULTIES, UNSPECIFIED: ICD-10-CM

## 2022-07-20 DIAGNOSIS — R63.4 WEIGHT LOSS, UNINTENTIONAL: ICD-10-CM

## 2022-07-20 DIAGNOSIS — D61.3 IDIOPATHIC APLASTIC ANEMIA (H): ICD-10-CM

## 2022-07-20 DIAGNOSIS — I82.412 ACUTE DEEP VEIN THROMBOSIS (DVT) OF FEMORAL VEIN OF LEFT LOWER EXTREMITY (H): ICD-10-CM

## 2022-07-20 LAB
BASOPHILS # BLD MANUAL: 0 10E3/UL (ref 0–0.2)
BASOPHILS NFR BLD MANUAL: 0 %
BURR CELLS BLD QL SMEAR: SLIGHT
EOSINOPHIL # BLD MANUAL: 0 10E3/UL (ref 0–0.7)
EOSINOPHIL NFR BLD MANUAL: 0 %
ERYTHROCYTE [DISTWIDTH] IN BLOOD BY AUTOMATED COUNT: 21.4 % (ref 10–15)
HCT VFR BLD AUTO: 24.4 % (ref 35–47)
HGB BLD-MCNC: 7.3 G/DL (ref 11.7–15.7)
LYMPHOCYTES # BLD MANUAL: 0.6 10E3/UL (ref 0.8–5.3)
LYMPHOCYTES NFR BLD MANUAL: 30 %
MCH RBC QN AUTO: 31.6 PG (ref 26.5–33)
MCHC RBC AUTO-ENTMCNC: 29.9 G/DL (ref 31.5–36.5)
MCV RBC AUTO: 106 FL (ref 78–100)
MONOCYTES # BLD MANUAL: 0.4 10E3/UL (ref 0–1.3)
MONOCYTES NFR BLD MANUAL: 20 %
NEUTROPHILS # BLD MANUAL: 1 10E3/UL (ref 1.6–8.3)
NEUTROPHILS NFR BLD MANUAL: 50 %
NRBC # BLD AUTO: 0 10E3/UL
NRBC BLD MANUAL-RTO: 2 %
PLAT MORPH BLD: ABNORMAL
PLATELET # BLD AUTO: 93 10E3/UL (ref 150–450)
RBC # BLD AUTO: 2.31 10E6/UL (ref 3.8–5.2)
RBC MORPH BLD: ABNORMAL
WBC # BLD AUTO: 1.9 10E3/UL (ref 4–11)

## 2022-07-20 PROCEDURE — 85007 BL SMEAR W/DIFF WBC COUNT: CPT | Performed by: INTERNAL MEDICINE

## 2022-07-20 PROCEDURE — 99215 OFFICE O/P EST HI 40 MIN: CPT | Performed by: INTERNAL MEDICINE

## 2022-07-20 PROCEDURE — G0463 HOSPITAL OUTPT CLINIC VISIT: HCPCS

## 2022-07-20 PROCEDURE — 99417 PROLNG OP E/M EACH 15 MIN: CPT | Performed by: INTERNAL MEDICINE

## 2022-07-20 PROCEDURE — 36415 COLL VENOUS BLD VENIPUNCTURE: CPT | Performed by: INTERNAL MEDICINE

## 2022-07-20 PROCEDURE — 85027 COMPLETE CBC AUTOMATED: CPT | Performed by: INTERNAL MEDICINE

## 2022-07-20 ASSESSMENT — PAIN SCALES - GENERAL: PAINLEVEL: NO PAIN (0)

## 2022-07-20 NOTE — NURSING NOTE
"Oncology Rooming Note    July 20, 2022 10:56 AM   Bonny Raymundo is a 76 year old female who presents for:    Chief Complaint   Patient presents with     Blood Draw     Labs drawn by RN via , vitals taken.     Oncology Clinic Visit     APLASTIC ANEMIA      Initial Vitals: /64   Pulse 94   Temp 98  F (36.7  C)   Resp 20   Wt 44.5 kg (98 lb)   SpO2 97%   BMI 17.92 kg/m   Estimated body mass index is 17.92 kg/m  as calculated from the following:    Height as of 4/28/22: 1.575 m (5' 2\").    Weight as of this encounter: 44.5 kg (98 lb). Body surface area is 1.4 meters squared.  No Pain (0) Comment: Data Unavailable   No LMP recorded. Patient has had a hysterectomy.  Allergies reviewed: Yes  Medications reviewed: Yes    Medications: Medication refills not needed today.  Pharmacy name entered into [employer]:    [facility] [place], [place] - [address], [address]  [facility], [address]/[facility]AC[facility][id number] - [place], [place] - [address] [facility]    Clinical concerns:  Has questions for provider. Pt is hard of hearing, forgot hearing aid at home.       Amy Abreu [facility]            "

## 2022-07-20 NOTE — PROGRESS NOTES
Hematology clinic visit  In person visit    Problem list:  - Aplastic anemia-diagnosed 9/26/2016, repeat bone marrow biopsy 12/22/16- bone marrow <5% cellular with no dysplasia.  Normal cytogenetics. PNH negative. 1/9/ 2017-treated with ATG, methylprednisolone, cyclosporine, and eltrombopag.  Cyclosporine discontinued May 2021, eltrombopag discontinued June 2021 due to abnormal liver function test.    Partial remission with the immunotherapy, which is gradually weaned.  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  - Shingles right face  2022    Interval history:  is here for follow-up of aplastic anemia.  She has previously seen Dr. Rogel, who has retired.  She was treated with ATG, methylprednisolone, cyclosporine, eltrombopag starting in January 2016, with a partial remission.  The cyclosporine was discontinued May 2021 due to renal insufficiency.  She was on eltrombopag for the platelet count, that was discontinued in June 2021 because of abnormal liver function tests.  Since her diagnosis in 2016, she has intermittently needed red blood cell transfusions.  She is also received Venofer for iron deficiency in 21/22.  She has not been on erythropoietin injections.    Today she reports that she has had some weight loss since January, although in looking through the notes it looks like its been going on longer than that.  She had some episodes of nausea in January, now better.  No abdominal pain.  She has chronic diarrhea and takes Imodium at night.  No abdominal pain.   No heartburn symptoms.  No shortness of breath.  She does note bruising on her forearms, which is been chronic.  No epistaxis, melena, hematochezia, hematuria, vaginal bleeding.  She is taking apixaban (Eliquis) 5 mg twice daily since she had recurrent DVT in March 2021.     She is wondering if she will receive another red blood cell transfusion, thinks there is one scheduled 2 days from now.    She also reports that today her left ear is hurting a lot.  She has had them plugged up before, needing wax removal.  She unfortunately did not wear her hearing aids today, making the interview difficult.     She also reports that her right eye is bothering her, ever since she had shingles.  She has follow-up already scheduled in ophthalmology.    Medications reviewed-Exanta 5 mg twice daily, B12 1000 mcg daily, loperamide 0.5 mg nightly, multivitamin 1 tablet daily, pantoprazole 20 mg daily.    PHYSICAL EXAMINATION:  /64   Pulse 94   Temp 98  F (36.7  C)   Resp 20   Wt 44.5 kg (98 lb)   SpO2 97%   BMI 17.92 kg/m      General appearance:  Patient is in no acute distress, frail, able to climb up onto exam table without assistance.  I had almost shout for her to understand what I was saying.  HEENT:  No pallor, icterus, or mucositis.  No obvious rash on right side of face.  No obvious abnormality of eyes, but I did not attempt to look at fundi.  Ears show bilateral tympanic membrane tubes surrounded by impacted cerumen.   Lymph nodes:  No cervical, supraclavicular, axillary, or inguinal lymphadenopathy.   Lungs:  Clear to auscultation bilaterally.   Heart:  Regular rate and rhythm; no S3 S4 or murmer.     Abdomen:  Positive bowel sounds, soft and nontender, nondistended.  No hepatomegaly. No splenomegaly appreciated.    Extremities:  No joint swelling or tenderness.  No ankle edema.     Skin: Thin skin with senile purpura on forearms and hands.  No rash, no petechiae or other ecchymoses.    Labs:   Latest Reference  Range & Units 07/08/22 11:03 07/20/22 10:43   WBC 4.0 - 11.0 10e3/uL 2.3 (L) 1.9 (L)   Hemoglobin 11.7 - 15.7 g/dL 7.6 (L) 7.3 (L)   Hematocrit 35.0 - 47.0 % 26.0 (L) 24.4 (L)   Platelet Count 150 - 450 10e3/uL 116 (L) 93 (L)   RBC Count 3.80 - 5.20 10e6/uL 2.47 (L) 2.31 (L)   MCV 78 - 100 fL 105 (H) 106 (H)   MCH 26.5 - 33.0 pg 30.8 31.6   MCHC 31.5 - 36.5 g/dL 29.2 (L) 29.9 (L)   RDW 10.0 - 15.0 % 21.2 (H) 21.4 (H)   % Neutrophils % 58 50   % Lymphocytes % 27 30   % Monocytes % 15 20   % Eosinophils % 0 0   % Basophils % 0 0   Absolute Basophils 0.0 - 0.2 10e3/uL 0.0 0.0   NRBC/W <=0 % 2 (H) 2 (H)   Absolute Neutrophil 1.6 - 8.3 10e3/uL 1.3 (L) 1.0 (L)   Absolute Lymphocytes 0.8 - 5.3 10e3/uL 0.6 (L) 0.6 (L)   Absolute Monocytes 0.0 - 1.3 10e3/uL 0.3 0.4   Absolute Eosinophils 0.0 - 0.7 10e3/uL 0.0 0.0   Absolute NRBCs <=0.0 10e3/uL 0.0 0.0   RBC Morphology  Confirmed RBC Indices Confirmed RBC Indices   Platelet Morphology Automated Count Confirmed. Platelet morphology is normal.  Automated Count Confirmed. Platelet morphology is normal. Automated Count Confirmed. Platelet morphology is normal.   Target Cells None Seen  Slight !    Elliptocytes None Seen  Slight !    Quang Cells None Seen   Slight !   % Retic 0.5 - 2.0 % 3.6 (H)    Absolute Retic 0.025 - 0.095 10e6/uL 0.090       Latest Reference Range & Units 07/08/22 11:03   Sodium 133 - 144 mmol/L 140   Potassium 3.4 - 5.3 mmol/L 4.3   Chloride 94 - 109 mmol/L 108   Carbon Dioxide 20 - 32 mmol/L 26   Urea Nitrogen 7 - 30 mg/dL 25   Creatinine 0.52 - 1.04 mg/dL 1.26 (H)   GFR Estimate >60 mL/min/1.73m2 43 (L)   Calcium 8.5 - 10.1 mg/dL 8.9   Anion Gap 3 - 14 mmol/L 6   Albumin 3.4 - 5.0 g/dL 2.2 (L)   Protein Total 6.8 - 8.8 g/dL 7.3   Alkaline Phosphatase 40 - 150 U/L 95   ALT 0 - 50 U/L 12   AST 0 - 45 U/L 15   Bilirubin Total 0.2 - 1.3 mg/dL 0.5       Assessment and Recommendation:  #Aplastic anemia- she has mild neutropenia and thrombocytopenia, which at this  point are not of clinical significance.  The bigger issue is the anemia.  She has been requiring some transfusions.  She has had erythropoietin checked more than a year ago and it was somewhat elevated, but not enough for her degree of anemia.  Therefore some of the anemia may be related to chronic kidney disease along with the aplastic anemia.  I am intrigued that she did have an undetectable haptoglobin in 2021.  She also perhaps had a tiny PNH clone way back in 2016 when she was diagnosed.  It would be reasonable to recheck that.  She also had an MGUS in 2017 that may be worth rechecking.  Assuming there is no PNH and the MGUS has not changed much, that she may benefit from Aranesp.  Over the short-term, I will set her up for a transfusion sometime within the next week, not urgent.  - Check erythropoietin level, iron studies, copper, PNH, SPEP, kappa/lambda, TSH with her next blood draw.  - Transfuse 1 unit packed red blood cells sometime within the next week  - If no clear-cut additional cause for the low hemoglobin, other than inappropriately low erythropoietin level, start Aranesp 100 mcg subcu every 3 weeks for hemoglobin less than 10 g/dL (may need to escalate)      #History of recurrent unprovoked DVT and PE-on apixaban (Eliquis) 5 mg twice daily.  She has not had any significant bleeding symptoms other than senile purpura.  Since she is been anticoagulated for more than 6 months, and her weight is less than 50 kg and she has chronic kidney disease (that is probably worse than indicated by the GFR which does not take into account her very low weight), she should have the dose of Eliquis decreased to 2.5 mg twice daily.  - Decrease apixaban to 2.5 mg twice daily-prescription sent    #Unexplained weight loss-she weighed 53.9 kg on 12/9/2021, she is now 44.5 kg today.  Some of that may have been the weight of boots and clothing in December, but I still if there is some true weight loss.  I did not have time  today to address this significantly.  She has had the diarrhea worked up in the past, negative for infectious agents.  She would likely benefit from oral supplements, will discuss next visit.    #Chronic kidney disease- 7/8/2022-creatinine 1.26 with GFR estimate 43, but since this is based on a BSA of 1.73, and her BSA is 1.44, I suspect her creatinine clearance is lower than 43.    -Send urine for protein  - Consider nephrology referral    #Decreased hearing, cerumen [id number] with primary care provider or ENT.    #Goals of care-we did not have time to get into this today since we are meeting for the first time and because of her hearing issues.  I do not see any sort of advance care directives and she is full code.  Her overall health seems to be declining, and she needs a discussion about goals of care.    RTC 8/22- Yeni Payne with labs  RTC 10/5/2022- Zana with labs    Time: I spent a total of 90 minutes on the day of the visit. Please see the note for further information on patient assessment and treatment.  Patient is new to me and I spent extensive time reviewing her chart and arranging various needed follow-up.    Minerva Spann MD  Hematology

## 2022-07-20 NOTE — NURSING NOTE
Chief Complaint   Patient presents with     Blood Draw     Labs drawn by RN via , vitals taken.     Labs collected from venipuncture by RN. Vitals taken. Checked in for appointment(s).    Santa Pool RN

## 2022-07-20 NOTE — LETTER
7/20/2022         RE: Bonny Raymundo  [address]  [place] [id number]        Dear Colleague,    Thank you for referring your patient, Bonny Raymundo, to the [facility]. Please see a copy of my visit note below.    Hematology clinic visit  In person visit    Problem list:  - Aplastic anemia-diagnosed 9/26/2016, repeat bone marrow biopsy 12/22/16- bone marrow <5% cellular with no dysplasia.  Normal cytogenetics. PNH negative. 1/9/ 2017-treated with ATG, methylprednisolone, cyclosporine, and eltrombopag.  Cyclosporine discontinued May 2021, eltrombopag discontinued June 2021 due to abnormal liver function test.    Partial remission with the immunotherapy, which is gradually weaned.  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  - Shingles right face  2022    Interval history:  is here for follow-up of aplastic anemia.  She has previously seen Dr. Rogel, who has retired.  She was treated with ATG, methylprednisolone, cyclosporine, eltrombopag starting in January 2016, with a partial remission.  The cyclosporine was discontinued May 2021 due to renal insufficiency.  She was on eltrombopag for the platelet count, that was discontinued in June 2021 because of abnormal liver function tests.  Since her diagnosis in 2016, she has intermittently needed red blood cell transfusions.  She is also received Venofer for iron deficiency in 21/22.  She has not been on erythropoietin injections.    Today she reports that she has had some weight  loss since January, although in looking through the notes it looks like its been going on longer than that.  She had some episodes of nausea in January, now better.  No abdominal pain.  She has chronic diarrhea and takes Imodium at night.  No abdominal pain.  No heartburn symptoms.  No shortness of breath.  She does note bruising on her forearms, which is been chronic.  No epistaxis, melena, hematochezia, hematuria, vaginal bleeding.  She is taking apixaban (Eliquis) 5 mg twice daily since she had recurrent DVT in March 2021.     She is wondering if she will receive another red blood cell transfusion, thinks there is one scheduled 2 days from now.    She also reports that today her left ear is hurting a lot.  She has had them plugged up before, needing wax removal.  She unfortunately did not wear her hearing aids today, making the interview difficult.     She also reports that her right eye is bothering her, ever since she had shingles.  She has follow-up already scheduled in ophthalmology.    Medications reviewed-Exanta 5 mg twice daily, B12 1000 mcg daily, loperamide 0.5 mg nightly, multivitamin 1 tablet daily, pantoprazole 20 mg daily.    PHYSICAL EXAMINATION:  /64   Pulse 94   Temp 98  F (36.7  C)   Resp 20   Wt 44.5 kg (98 lb)   SpO2 97%   BMI 17.92 kg/m      General appearance:  Patient is in no acute distress, frail, able to climb up onto exam table without assistance.  I had almost shout for her to understand what I was saying.  HEENT:  No pallor, icterus, or mucositis.  No obvious rash on right side of face.  No obvious abnormality of eyes, but I did not attempt to look at fundi.  Ears show bilateral tympanic membrane tubes surrounded by impacted cerumen.   Lymph nodes:  No cervical, supraclavicular, axillary, or inguinal lymphadenopathy.   Lungs:  Clear to auscultation bilaterally.   Heart:  Regular rate and rhythm; no S3 S4 or murmer.     Abdomen:  Positive bowel sounds, soft and nontender,  nondistended.  No hepatomegaly. No splenomegaly appreciated.    Extremities:  No joint swelling or tenderness.  No ankle edema.     Skin: Thin skin with senile purpura on forearms and hands.  No rash, no petechiae or other ecchymoses.    Labs:   Latest Reference Range & Units 07/08/22 11:03 07/20/22 10:43   WBC 4.0 - 11.0 10e3/uL 2.3 (L) 1.9 (L)   Hemoglobin 11.7 - 15.7 g/dL 7.6 (L) 7.3 (L)   Hematocrit 35.0 - 47.0 % 26.0 (L) 24.4 (L)   Platelet Count 150 - 450 10e3/uL 116 (L) 93 (L)   RBC Count 3.80 - 5.20 10e6/uL 2.47 (L) 2.31 (L)   MCV 78 - 100 fL 105 (H) 106 (H)   MCH 26.5 - 33.0 pg 30.8 31.6   MCHC 31.5 - 36.5 g/dL 29.2 (L) 29.9 (L)   RDW 10.0 - 15.0 % 21.2 (H) 21.4 (H)   % Neutrophils % 58 50   % Lymphocytes % 27 30   % Monocytes % 15 20   % Eosinophils % 0 0   % Basophils % 0 0   Absolute Basophils 0.0 - 0.2 10e3/uL 0.0 0.0   NRBC/W <=0 % 2 (H) 2 (H)   Absolute Neutrophil 1.6 - 8.3 10e3/uL 1.3 (L) 1.0 (L)   Absolute Lymphocytes 0.8 - 5.3 10e3/uL 0.6 (L) 0.6 (L)   Absolute Monocytes 0.0 - 1.3 10e3/uL 0.3 0.4   Absolute Eosinophils 0.0 - 0.7 10e3/uL 0.0 0.0   Absolute NRBCs <=0.0 10e3/uL 0.0 0.0   RBC Morphology  Confirmed RBC Indices Confirmed RBC Indices   Platelet Morphology Automated Count Confirmed. Platelet morphology is normal.  Automated Count Confirmed. Platelet morphology is normal. Automated Count Confirmed. Platelet morphology is normal.   Target Cells None Seen  Slight !    Elliptocytes None Seen  Slight !    Quang Cells None Seen   Slight !   % Retic 0.5 - 2.0 % 3.6 (H)    Absolute Retic 0.025 - 0.095 10e6/uL 0.090       Latest Reference Range & Units 07/08/22 11:03   Sodium 133 - 144 mmol/L 140   Potassium 3.4 - 5.3 mmol/L 4.3   Chloride 94 - 109 mmol/L 108   Carbon Dioxide 20 - 32 mmol/L 26   Urea Nitrogen 7 - 30 mg/dL 25   Creatinine 0.52 - 1.04 mg/dL 1.26 (H)   GFR Estimate >60 mL/min/1.73m2 43 (L)   Calcium 8.5 - 10.1 mg/dL 8.9   Anion Gap 3 - 14 mmol/L 6   Albumin 3.4 - 5.0 g/dL 2.2 (L)    Protein Total 6.8 - 8.8 g/dL 7.3   Alkaline Phosphatase 40 - 150 U/L 95   ALT 0 - 50 U/L 12   AST 0 - 45 U/L 15   Bilirubin Total 0.2 - 1.3 mg/dL 0.5       Assessment and Recommendation:  #Aplastic anemia- she has mild neutropenia and thrombocytopenia, which at this point are not of clinical significance.  The bigger issue is the anemia.  She has been requiring some transfusions.  She has had erythropoietin checked more than a year ago and it was somewhat elevated, but not enough for her degree of anemia.  Therefore some of the anemia may be related to chronic kidney disease along with the aplastic anemia.  I am intrigued that she did have an undetectable haptoglobin in 2021.  She also perhaps had a tiny PNH clone way back in 2016 when she was diagnosed.  It would be reasonable to recheck that.  She also had an MGUS in 2017 that may be worth rechecking.  Assuming there is no PNH and the MGUS has not changed much, that she may benefit from Aranesp.  Over the short-term, I will set her up for a transfusion sometime within the next week, not urgent.  - Check erythropoietin level, iron studies, copper, PNH, SPEP, kappa/lambda, TSH with her next blood draw.  - Transfuse 1 unit packed red blood cells sometime within the next week  - If no clear-cut additional cause for the low hemoglobin, other than inappropriately low erythropoietin level, start Aranesp 100 mcg subcu every 3 weeks for hemoglobin less than 10 g/dL (may need to escalate)      #History of recurrent unprovoked DVT and PE-on apixaban (Eliquis) 5 mg twice daily.  She has not had any significant bleeding symptoms other than senile purpura.  Since she is been anticoagulated for more than 6 months, and her weight is less than 50 kg and she has chronic kidney disease (that is probably worse than indicated by the GFR which does not take into account her very low weight), she should have the dose of Eliquis decreased to 2.5 mg twice daily.  - Decrease apixaban to  2.5 mg twice daily-prescription sent    #Unexplained weight loss-she weighed 53.9 kg on 12/9/2021, she is now 44.5 kg today.  Some of that may have been the weight of boots and clothing in December, but I still if there is some true weight loss.  I did not have time today to address this significantly.  She has had the diarrhea worked up in the past, negative for infectious agents.  She would likely benefit from oral supplements, will discuss next visit.    #Chronic kidney disease- 7/8/2022-creatinine 1.26 with GFR estimate 43, but since this is based on a BSA of 1.73, and her BSA is 1.44, I suspect her creatinine clearance is lower than 43.    -Send urine for protein  - Consider nephrology referral    #Decreased hearing, cerumen [id number] with primary care provider or ENT.    #Goals of care-we did not have time to get into this today since we are meeting for the first time and because of her hearing issues.  I do not see any sort of advance care directives and she is full code.  Her overall health seems to be declining, and she needs a discussion about goals of care.    RTC 8/22- Yeni Payne with labs  RTC 10/5/2022- Zana with labs    Time: I spent a total of 90 minutes on the day of the visit. Please see the note for further information on patient assessment and treatment.  Patient is new to me and I spent extensive time reviewing her chart and arranging various needed follow-up.      Minerva Spann MD  Hematology

## 2022-07-21 LAB
PATH REPORT.COMMENTS IMP SPEC: ABNORMAL
PATH REPORT.COMMENTS IMP SPEC: YES
PATH REPORT.FINAL DX SPEC: ABNORMAL
PATH REPORT.MICROSCOPIC SPEC OTHER STN: ABNORMAL
PATH REPORT.RELEVANT HX SPEC: ABNORMAL

## 2022-07-21 PROCEDURE — 88184 FLOWCYTOMETRY/ TC 1 MARKER: CPT | Performed by: INTERNAL MEDICINE

## 2022-07-21 PROCEDURE — 88185 FLOWCYTOMETRY/TC ADD-ON: CPT | Performed by: INTERNAL MEDICINE

## 2022-07-21 PROCEDURE — 88189 FLOWCYTOMETRY/READ 16 & >: CPT | Performed by: STUDENT IN AN ORGANIZED HEALTH CARE EDUCATION/TRAINING PROGRAM

## 2022-07-26 ENCOUNTER — LAB (OUTPATIENT)
Dept: INFUSION THERAPY | Facility: CLINIC | Age: 77
End: 2022-07-26
Attending: NURSE PRACTITIONER
Payer: COMMERCIAL

## 2022-07-26 ENCOUNTER — TELEPHONE (OUTPATIENT)
Dept: ONCOLOGY | Facility: CLINIC | Age: 77
End: 2022-07-26

## 2022-07-26 DIAGNOSIS — N18.4 CKD (CHRONIC KIDNEY DISEASE) STAGE 4, GFR 15-29 ML/MIN (H): ICD-10-CM

## 2022-07-26 DIAGNOSIS — R63.30 FEEDING DIFFICULTIES, UNSPECIFIED: ICD-10-CM

## 2022-07-26 DIAGNOSIS — R63.4 WEIGHT LOSS, UNINTENTIONAL: ICD-10-CM

## 2022-07-26 DIAGNOSIS — D61.3 IDIOPATHIC APLASTIC ANEMIA (H): ICD-10-CM

## 2022-07-26 LAB
ABO/RH(D): NORMAL
ALBUMIN SERPL-MCNC: 2.1 G/DL (ref 3.4–5)
ALP SERPL-CCNC: 100 U/L (ref 40–150)
ALT SERPL W P-5'-P-CCNC: 14 U/L (ref 0–50)
ANION GAP SERPL CALCULATED.3IONS-SCNC: 5 MMOL/L (ref 3–14)
ANTIBODY SCREEN: NEGATIVE
AST SERPL W P-5'-P-CCNC: 18 U/L (ref 0–45)
BASOPHILS # BLD MANUAL: 0 10E3/UL (ref 0–0.2)
BASOPHILS NFR BLD MANUAL: 0 %
BILIRUB SERPL-MCNC: 0.5 MG/DL (ref 0.2–1.3)
BLD PROD TYP BPU: NORMAL
BLOOD COMPONENT TYPE: NORMAL
BUN SERPL-MCNC: 22 MG/DL (ref 7–30)
CALCIUM SERPL-MCNC: 8.4 MG/DL (ref 8.5–10.1)
CHLORIDE BLD-SCNC: 107 MMOL/L (ref 94–109)
CO2 SERPL-SCNC: 27 MMOL/L (ref 20–32)
CODING SYSTEM: NORMAL
CREAT SERPL-MCNC: 1.15 MG/DL (ref 0.52–1.04)
CREAT UR-MCNC: 74 MG/DL
CROSSMATCH: NORMAL
EOSINOPHIL # BLD MANUAL: 0 10E3/UL (ref 0–0.7)
EOSINOPHIL NFR BLD MANUAL: 0 %
ERYTHROCYTE [DISTWIDTH] IN BLOOD BY AUTOMATED COUNT: 21.9 % (ref 10–15)
FERRITIN SERPL-MCNC: 367 NG/ML (ref 8–252)
GFR SERPL CREATININE-BSD FRML MDRD: 49 ML/MIN/1.73M2
GLUCOSE BLD-MCNC: 110 MG/DL (ref 70–99)
HCT VFR BLD AUTO: 23.6 % (ref 35–47)
HGB BLD-MCNC: 6.9 G/DL (ref 11.7–15.7)
HOLD SPECIMEN: NORMAL
IRON SATN MFR SERPL: 36 % (ref 15–46)
IRON SERPL-MCNC: 41 UG/DL (ref 35–180)
ISSUE DATE AND TIME: NORMAL
LYMPHOCYTES # BLD MANUAL: 1 10E3/UL (ref 0.8–5.3)
LYMPHOCYTES NFR BLD MANUAL: 46 %
MCH RBC QN AUTO: 31.4 PG (ref 26.5–33)
MCHC RBC AUTO-ENTMCNC: 29.2 G/DL (ref 31.5–36.5)
MCV RBC AUTO: 107 FL (ref 78–100)
METAMYELOCYTES # BLD MANUAL: 0 10E3/UL
METAMYELOCYTES NFR BLD MANUAL: 2 %
MONOCYTES # BLD MANUAL: 0.4 10E3/UL (ref 0–1.3)
MONOCYTES NFR BLD MANUAL: 19 %
NEUTROPHILS # BLD MANUAL: 0.7 10E3/UL (ref 1.6–8.3)
NEUTROPHILS NFR BLD MANUAL: 33 %
NRBC # BLD AUTO: 0.1 10E3/UL
NRBC BLD MANUAL-RTO: 3 %
PLAT MORPH BLD: ABNORMAL
PLATELET # BLD AUTO: 111 10E3/UL (ref 150–450)
POTASSIUM BLD-SCNC: 4 MMOL/L (ref 3.4–5.3)
PROT SERPL-MCNC: 6.8 G/DL (ref 6.8–8.8)
PROT UR-MCNC: 0.11 G/L
PROT/CREAT 24H UR: 0.15 G/G CR (ref 0–0.2)
RBC # BLD AUTO: 2.2 10E6/UL (ref 3.8–5.2)
RBC MORPH BLD: ABNORMAL
RETICS # AUTO: 0.09 10E6/UL (ref 0.03–0.1)
RETICS/RBC NFR AUTO: 4.2 % (ref 0.5–2)
SODIUM SERPL-SCNC: 139 MMOL/L (ref 133–144)
SPECIMEN EXPIRATION DATE: NORMAL
TIBC SERPL-MCNC: 113 UG/DL (ref 240–430)
TOTAL PROTEIN SERUM FOR ELP: 6.4 G/DL (ref 6.4–8.3)
TSH SERPL DL<=0.005 MIU/L-ACNC: 1.97 MU/L (ref 0.4–4)
UNIT ABO/RH: NORMAL
UNIT NUMBER: NORMAL
UNIT STATUS: NORMAL
UNIT TYPE ISBT: 6200
WBC # BLD AUTO: 2.2 10E3/UL (ref 4–11)

## 2022-07-26 PROCEDURE — 86901 BLOOD TYPING SEROLOGIC RH(D): CPT | Performed by: INTERNAL MEDICINE

## 2022-07-26 PROCEDURE — 84155 ASSAY OF PROTEIN SERUM: CPT | Mod: 91 | Performed by: INTERNAL MEDICINE

## 2022-07-26 PROCEDURE — 82525 ASSAY OF COPPER: CPT | Performed by: INTERNAL MEDICINE

## 2022-07-26 PROCEDURE — 88187 FLOWCYTOMETRY/READ 2-8: CPT | Performed by: STUDENT IN AN ORGANIZED HEALTH CARE EDUCATION/TRAINING PROGRAM

## 2022-07-26 PROCEDURE — 85007 BL SMEAR W/DIFF WBC COUNT: CPT | Performed by: INTERNAL MEDICINE

## 2022-07-26 PROCEDURE — 84165 PROTEIN E-PHORESIS SERUM: CPT | Mod: TC | Performed by: PATHOLOGY

## 2022-07-26 PROCEDURE — 84165 PROTEIN E-PHORESIS SERUM: CPT | Mod: 26

## 2022-07-26 PROCEDURE — 83521 IG LIGHT CHAINS FREE EACH: CPT | Performed by: INTERNAL MEDICINE

## 2022-07-26 PROCEDURE — 85045 AUTOMATED RETICULOCYTE COUNT: CPT | Performed by: INTERNAL MEDICINE

## 2022-07-26 PROCEDURE — 82668 ASSAY OF ERYTHROPOIETIN: CPT | Performed by: INTERNAL MEDICINE

## 2022-07-26 PROCEDURE — 36415 COLL VENOUS BLD VENIPUNCTURE: CPT

## 2022-07-26 PROCEDURE — 84156 ASSAY OF PROTEIN URINE: CPT | Performed by: INTERNAL MEDICINE

## 2022-07-26 PROCEDURE — 85027 COMPLETE CBC AUTOMATED: CPT | Performed by: INTERNAL MEDICINE

## 2022-07-26 PROCEDURE — 84443 ASSAY THYROID STIM HORMONE: CPT | Performed by: INTERNAL MEDICINE

## 2022-07-26 PROCEDURE — 82728 ASSAY OF FERRITIN: CPT | Performed by: INTERNAL MEDICINE

## 2022-07-26 PROCEDURE — 83010 ASSAY OF HAPTOGLOBIN QUANT: CPT | Performed by: INTERNAL MEDICINE

## 2022-07-26 PROCEDURE — 82040 ASSAY OF SERUM ALBUMIN: CPT | Performed by: INTERNAL MEDICINE

## 2022-07-26 PROCEDURE — 88184 FLOWCYTOMETRY/ TC 1 MARKER: CPT | Performed by: INTERNAL MEDICINE

## 2022-07-26 PROCEDURE — 83550 IRON BINDING TEST: CPT | Performed by: INTERNAL MEDICINE

## 2022-07-26 PROCEDURE — 80053 COMPREHEN METABOLIC PANEL: CPT | Performed by: INTERNAL MEDICINE

## 2022-07-26 RX ORDER — HEPARIN SODIUM,PORCINE 10 UNIT/ML
5 VIAL (ML) INTRAVENOUS
Status: CANCELLED | OUTPATIENT
Start: 2022-07-26

## 2022-07-26 RX ORDER — HEPARIN SODIUM (PORCINE) LOCK FLUSH IV SOLN 100 UNIT/ML 100 UNIT/ML
5 SOLUTION INTRAVENOUS
Status: CANCELLED | OUTPATIENT
Start: 2022-07-26

## 2022-07-26 NOTE — TELEPHONE ENCOUNTER
DATE:  7/26/22    TIME OF RECEIPT FROM LAB:  12:36pm    LAB TEST:  Hgb 6.9 Platelets 111   Has therapy plan in place to transfuse if Hgb 7 or less   Has appt scheduled on 7/27/22 at [facility] 12:00pm for transfusion.     RESULTS SENT TO (PROVIDER):  Dr Spann     Call placed to parvin Draper message to let her know that her Hgb was 6.9 and she should keep her transfusion appt for tomorrow.

## 2022-07-27 ENCOUNTER — INFUSION THERAPY VISIT (OUTPATIENT)
Dept: INFUSION THERAPY | Facility: CLINIC | Age: 77
End: 2022-07-27
Attending: NURSE PRACTITIONER
Payer: COMMERCIAL

## 2022-07-27 VITALS
TEMPERATURE: 98.4 F | RESPIRATION RATE: 16 BRPM | DIASTOLIC BLOOD PRESSURE: 76 MMHG | SYSTOLIC BLOOD PRESSURE: 128 MMHG | HEART RATE: 69 BPM | OXYGEN SATURATION: 97 %

## 2022-07-27 DIAGNOSIS — D61.9 APLASTIC ANEMIA (H): Primary | ICD-10-CM

## 2022-07-27 DIAGNOSIS — D61.818 PANCYTOPENIA (H): ICD-10-CM

## 2022-07-27 LAB
ALBUMIN SERPL ELPH-MCNC: 2.6 G/DL (ref 3.7–5.1)
ALPHA1 GLOB SERPL ELPH-MCNC: 0.5 G/DL (ref 0.2–0.4)
ALPHA2 GLOB SERPL ELPH-MCNC: 0.5 G/DL (ref 0.5–0.9)
B-GLOBULIN SERPL ELPH-MCNC: 0.7 G/DL (ref 0.6–1)
EPO SERPL-ACNC: 340 MU/ML
GAMMA GLOB SERPL ELPH-MCNC: 2.1 G/DL (ref 0.7–1.6)
HAPTOGLOB SERPL-MCNC: 61 MG/DL (ref 32–197)
KAPPA LC FREE SER-MCNC: 15.78 MG/DL (ref 0.33–1.94)
KAPPA LC FREE/LAMBDA FREE SER NEPH: 1.39 {RATIO} (ref 0.26–1.65)
LAMBDA LC FREE SERPL-MCNC: 11.33 MG/DL (ref 0.57–2.63)
M PROTEIN SERPL ELPH-MCNC: 0 G/DL
PATH REPORT.COMMENTS IMP SPEC: NORMAL
PATH REPORT.FINAL DX SPEC: NORMAL
PATH REPORT.MICROSCOPIC SPEC OTHER STN: NORMAL
PATH REPORT.RELEVANT HX SPEC: NORMAL
PROT PATTERN SERPL ELPH-IMP: ABNORMAL

## 2022-07-27 PROCEDURE — P9016 RBC LEUKOCYTES REDUCED: HCPCS | Performed by: INTERNAL MEDICINE

## 2022-07-27 PROCEDURE — 36430 TRANSFUSION BLD/BLD COMPNT: CPT

## 2022-07-27 PROCEDURE — 86923 COMPATIBILITY TEST ELECTRIC: CPT | Performed by: INTERNAL MEDICINE

## 2022-07-27 ASSESSMENT — PAIN SCALES - GENERAL: PAINLEVEL: NO PAIN (0)

## 2022-07-27 NOTE — PROGRESS NOTES
Infusion Nursing Note:  Bonny Raymundo presents today for 1 unit PRBCs.    Patient seen by provider today: No   present during visit today: Not Applicable.    Note: Patient reports increase in generalized weakness. Continues with SOB on exertion. Denies dizziness or lightheadedness. Reports no new concerns since exam with Dr. Spann on 7/20.    Intravenous Access:  Peripheral IV placed.    Treatment Conditions:  Lab Results   Component Value Date    HGB 6.9 (LL) 07/26/2022    WBC 2.2 (L) 07/26/2022    ANEU 0.7 (L) 07/26/2022    ANEUTAUTO 1.0 (L) 06/27/2022     (L) 07/26/2022      Results reviewed, labs MET treatment parameters for 1 unit PRBCs, ok to proceed with treatment. Remainder of CBC stable for patient.  Blood transfusion consent signed 6/4/22.    Post Infusion Assessment:  Patient tolerated infusion without incident.  Blood return noted pre and post infusion.  Site patent and intact, free from redness, edema or discomfort.  No evidence of extravasations.  Access discontinued per protocol.     Discharge Plan:   Discharge instructions reviewed with: Patient.  Patient and/or family verbalized understanding of discharge instructions and all questions answered.  Copy of AVS reviewed with patient and/or family.  Patient will return to [facility] 8/22/22 for next appointment.  Patient discharged in stable condition accompanied by: self.  Departure Mode: Ambulatory.      Demetrice Connolly RN

## 2022-07-28 ENCOUNTER — OFFICE VISIT (OUTPATIENT)
Dept: OPHTHALMOLOGY | Facility: CLINIC | Age: 77
End: 2022-07-28
Attending: OPHTHALMOLOGY
Payer: COMMERCIAL

## 2022-07-28 DIAGNOSIS — H53.10 SUBJECTIVE VISUAL DISTURBANCE: ICD-10-CM

## 2022-07-28 DIAGNOSIS — H53.2 DOUBLE VISION: ICD-10-CM

## 2022-07-28 DIAGNOSIS — I63.9 OCCIPITAL STROKE (H): Primary | ICD-10-CM

## 2022-07-28 DIAGNOSIS — H53.461 RIGHT HOMONYMOUS INFERIOR QUADRANTANOPIA: ICD-10-CM

## 2022-07-28 DIAGNOSIS — H53.462 HOMONYMOUS BILATERAL FIELD DEFECTS IN VISUAL FIELD, LEFT: ICD-10-CM

## 2022-07-28 PROCEDURE — 99215 OFFICE O/P EST HI 40 MIN: CPT | Mod: GC | Performed by: OPHTHALMOLOGY

## 2022-07-28 PROCEDURE — 92060 SENSORIMOTOR EXAMINATION: CPT | Performed by: OPHTHALMOLOGY

## 2022-07-28 PROCEDURE — G0463 HOSPITAL OUTPT CLINIC VISIT: HCPCS | Performed by: TECHNICIAN/TECHNOLOGIST

## 2022-07-28 ASSESSMENT — EXTERNAL EXAM - RIGHT EYE: OD_EXAM: NORMAL

## 2022-07-28 ASSESSMENT — REFRACTION_WEARINGRX
OD_AXIS: 060
OD_HPRISM: 3.0
OD_SPHERE: -2.50
OS_HPRISM: 3.0
OS_SPHERE: -1.25
OD_CYLINDER: +2.75
OD_VBASE: DOWN
OS_VPRISM: 1.0
OD_VPRISM: 1.0
OS_HBASE: OUT
OS_CYLINDER: +2.25
OS_ADD: +2.75
OS_VBASE: UP
OD_ADD: +2.75
OS_AXIS: 159
OD_HBASE: OUT

## 2022-07-28 ASSESSMENT — REFRACTION_MANIFEST
OD_SPHERE: -2.00
OS_AXIS: 160
OD_CYLINDER: +2.75
OS_ADD: +2.75
OS_CYLINDER: +2.00
OS_SPHERE: -1.25
OD_AXIS: 053
OD_ADD: +2.75

## 2022-07-28 ASSESSMENT — VISUAL ACUITY
CORRECTION_TYPE: GLASSES
METHOD: SNELLEN - LINEAR
OS_CC+: -2
OS_CC: 20/20
OD_CC: 20/30

## 2022-07-28 ASSESSMENT — TONOMETRY
OS_IOP_MMHG: 13
IOP_METHOD: ICARE
OD_IOP_MMHG: 10

## 2022-07-28 ASSESSMENT — CONF VISUAL FIELD
METHOD: COUNTING FINGERS
OS_INFERIOR_NASAL_RESTRICTION: 1
OD_INFERIOR_TEMPORAL_RESTRICTION: 1

## 2022-07-28 ASSESSMENT — SLIT LAMP EXAM - LIDS
COMMENTS: NORMAL
COMMENTS: NORMAL

## 2022-07-28 ASSESSMENT — CUP TO DISC RATIO
OD_RATIO: 0.2
OS_RATIO: 0.2

## 2022-07-28 NOTE — PROGRESS NOTES
"   1.  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 With her glasses she has 2 diopters of esophoria in primary gaze but fuses it easily with even greater fusional amplitude available. No changes in prescription presently indicated as she is not having diplopia. Will reassess in a few months    2. New right homonymous hemianopia with history of acute onset blurred side vision in the right eye 6/4/22 and difficulty walking- clinical concern for recent stroke.    Patient notes that around 6/4/2022 she woke up and \"couldn't see\" and complained of decreased visual field in right side of right eye. Also complained of trouble walking, feeling like she was veering to the left, feeling like she had to hold on to furniture so she did not fall. Today her visual acuity in the right eye is 20/30 and she has a right inferior homonymous quadrantopia concerning for a stroke given sudden onset of symptoms. Does not take aspirin because of her history of aplastic anemia so will not start.  Of note also has epiretinal membrane in right eye. Ordered STAT MRI brain and MRA head/neck.     Return to clinic in 2-3 months. Will call patient with results of imaging and plan.  If the patient does have a stroke it is now nearly 7 weeks old so did not see the need to send the patient to the emergency instead I would set her up as an outpatient for stroke neurology evaluation    Note to DOROTA Phelps     Bonny Raymundo is a pleasant 76 year old White female who presents to my neuro-ophthalmology clinic today for management of diplopia- update prism and possible post-herpetic neuralgia per Dr. Loyd.    HPI:  Bonny Raymundo is a " "76 year old female with past ocular history of myopia, dislocated IOL s/p PPV with lens placement in right eye, pseudophakia 1980s each eye, ERM right eye, shingles V2, and esotropia/hyperopia who presents today referred by Dr. Loyd for assessment for heavy eye vs sagging eye syndrome.     States she's had blurry vision for a long time which is corrected with prisms. Feels new blurry vision started in 6/2022 when woke up with blurry vision and dizziness and she felt her right eye was bigger, pupil and iris were funny looking. She states that since then all these things have \"self corrected.\" She was evaluated by Dr. Loyd in retina clinic for these complaints who felt that patient had worsening binocular diplopia and referred here for evaluation.     Today she states that she has blurry vision in the right side of her right eye visual field which is present constantly and is worse in the early morning than later in the day. Uses artificial tears twice per day which she does not feel help with her blurry vision in the right eye. Does not see double presently and states she feels her diplopia has been very well controlled even since June 2022. States she does not have any pain in the area of her previous shingles rash but does have some itching which worsens with scratching but finds it all manageable and tolerable.    Independent historians:  Patient    Review of outside testing:  MRI OF THE BRAIN WITHOUT AND WITH CONTRAST 9/10/2020 12:01 PM   Diffuse cerebral volume loss and cerebral white matter  changes consistent with chronic small vessel ischemic disease. No evidence for acute intracranial pathology.    My interpretation performed today of outside testing:  None    Review of outside clinical notes:  Last retina clinic note with Dr. Loyd.    Past medical history:  Past Medical History:   Diagnosis Date     Allergic rhinitis due to other allergen      Aplastic anemia (H) 1/9/2017     Closed anterior " dislocation of humerus      DVT of lower extremity (deep venous thrombosis) (H) 10-12    Left after prolonged sitting-plane     DVT, recurrent, lower extremity, acute (H)      Headache(784.0)      Osteoporosis, unspecified      Pulmonary emboli (H) 10-12     Sensorineural hearing loss, unspecified      Sprain of ankle, unspecified site     L   POH as above in one liner.      Family history / social history:  I have reviewed this patient's family history and updated it with pertinent information if needed.  Family History   Problem Relation Age of Onset     Musculoskeletal Disorder Mother         MS     Heart Disease Father      Heart Disease Brother         afib     Social History     Tobacco Use     Smoking status: Former Smoker     Quit date: 1973     Years since quittin.2     Smokeless tobacco: Never Used   Substance Use Topics     Alcohol use: No     Alcohol/week: 0.0 standard drinks     Comment: occasionally     Drug use: No       Exam:  Visual acuity 20/30 right eye 20/20 left eye.  Pupils right irregular and sluggish, left round and slow, no afferent pupillary defect.  Intraocular pressure 10 right eye and 13 left eye.      Please see epic chart for complete exam     Confrontation visual fields performed by myself revealed a dense inferior right quadrantanopia defect.  Slit-lamp examination revealed artificial intraocular lenses in both eyes in good location.  Both optic nerve heads appeared normal.  The fundus exam was essentially unremarkable.    Tests ordered and interpreted today:  Sensorimotor examination revealed full motility in both eyes.  Wearing her prism glasses with a total of 6 base out correction and 2 prism diopters right hypertropia correction demonstrated an esodeviation of 2 prism diopters in primary gaze right gaze upgaze downgaze which increased slightly to 4 prism diopters in left gaze.  She was fusing in all gaze positions with her prism glasses today without diplopia.   There is no nystagmus in any gaze position.         45 minutes were spent on the date of the encounter by me doing chart review, history and exam, documentation, and further activities as noted above    Complete documentation of historical and exam elements from today's encounter can be found in the full encounter summary report (not reduplicated in this progress note).  I personally obtained the chief complaint(s) and history of present illness.  I confirmed and edited as necessary the review of systems, past medical/surgical history, family history, social history, and examination findings as documented by others; and I examined the patient myself.  I personally reviewed the relevant tests, images, and reports as documented above.  I formulated and edited as necessary the assessment and plan and discussed the findings and management plan with the patient and family.  I personally reviewed the ophthalmic test(s) associated with this encounter, agree with the interpretation(s) as documented by the resident/fellow, and have edited the corresponding report(s) as necessary.     MD Ellen Negrete MD  Resident Physician - PGY3  Department of Ophthalmology   [facility]

## 2022-07-28 NOTE — LETTER
"2022    RE: Bonny Raymundo  : 1945  MRN: [id number]    Dear Providers,    I saw our mutual patient, Bonny Raymundo, in follow-up in my clinic recently.  After a thorough neuro-ophthalmic history and examination, I came to the following conclusions:     1.  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 With her glasses she has 2 diopters of esophoria in primary gaze but fuses it easily with even greater fusional amplitude available. No changes in prescription presently indicated as she is not having diplopia. Will reassess in a few months    2. New right homonymous hemianopia with history of acute onset blurred side vision in the right eye 22 and difficulty walking- clinical concern for recent stroke.    Patient notes that around 2022 she woke up and \"couldn't see\" and complained of decreased visual field in right side of right eye. Also complained of trouble walking, feeling like she was veering to the left, feeling like she had to hold on to furniture so she did not fall. Today her visual acuity in the right eye is 20/30 and she has a right inferior homonymous quadrantopia concerning for a stroke given sudden onset of symptoms. Does not take aspirin because of her history of aplastic anemia so will not start.  Of note also has epiretinal membrane in right eye. Ordered STAT MRI brain and MRA head/neck.     Return to clinic in 2-3 months. Will call patient with results of imaging and plan.  If the patient does have a stroke it is now nearly 7 weeks old so did not see the need to send the patient to the emergency instead I would set her up as an outpatient for stroke neurology " "evaluation    Note to DOROTA Phelps     Bonny Raymundo is a pleasant 76 year old White female who presents to my neuro-ophthalmology clinic today for management of diplopia- update prism and possible post-herpetic neuralgia per Dr. Loyd.      HPI:  Bonny Raymundo is a 76 year old female with past ocular history of myopia, dislocated IOL s/p PPV with lens placement in right eye, pseudophakia 1980s each eye, ERM right eye, shingles V2, and esotropia/hyperopia who presents today referred by Dr. Loyd for assessment for heavy eye vs sagging eye syndrome.     States she's had blurry vision for a long time which is corrected with prisms. Feels new blurry vision started in 6/2022 when woke up with blurry vision and dizziness and she felt her right eye was bigger, pupil and iris were funny looking. She states that since then all these things have \"self corrected.\" She was evaluated by Dr. Loyd in retina clinic for these complaints who felt that patient had worsening binocular diplopia and referred here for evaluation.     Today she states that she has blurry vision in the right side of her right eye visual field which is present constantly and is worse in the early morning than later in the day. Uses artificial tears twice per day which she does not feel help with her blurry vision in the right eye. Does not see double presently and states she feels her diplopia has been very well controlled even since June 2022. States she does not have any pain in the area of her previous shingles rash but does have some itching which worsens with scratching but finds it all manageable and tolerable.    Independent historians:  Patient    Review of outside testing:  MRI OF THE BRAIN WITHOUT AND WITH CONTRAST 9/10/2020 12:01 PM   Diffuse cerebral volume loss and cerebral white matter  changes consistent with chronic small vessel ischemic disease. No evidence for acute intracranial pathology.    My interpretation performed today " of outside testing:  None    Review of outside clinical notes:  Last retina clinic note with Dr. Loyd.    Past medical history:  Past Medical History:   Diagnosis Date     Allergic rhinitis due to other allergen      Aplastic anemia (H) 2017     Closed anterior dislocation of humerus      DVT of lower extremity (deep venous thrombosis) (H) 10-12    Left after prolonged sitting-plane     DVT, recurrent, lower extremity, acute (H)      Headache(784.0)      Osteoporosis, unspecified      Pulmonary emboli (H) 10-12     Sensorineural hearing loss, unspecified      Sprain of ankle, unspecified site     L   POH as above in one liner.      Family history / social history:  I have reviewed this patient's family history and updated it with pertinent information if needed.  Family History   Problem Relation Age of Onset     Musculoskeletal Disorder Mother         MS     Heart Disease Father      Heart Disease Brother         afib     Social History     Tobacco Use     Smoking status: Former Smoker     Quit date: 1973     Years since quittin.2     Smokeless tobacco: Never Used   Substance Use Topics     Alcohol use: No     Alcohol/week: 0.0 standard drinks     Comment: occasionally     Drug use: No     Exam:  Visual acuity 20/30 right eye 20/20 left eye.  Pupils right irregular and sluggish, left round and slow, no afferent pupillary defect.  Intraocular pressure 10 right eye and 13 left eye.      Please see epic chart for complete exam     Confrontation visual fields performed by myself revealed a dense inferior right quadrantanopia defect.  Slit-lamp examination revealed artificial intraocular lenses in both eyes in good location.  Both optic nerve heads appeared normal.  The fundus exam was essentially unremarkable.    Tests ordered and interpreted today:  Sensorimotor examination revealed full motility in both eyes.  Wearing her prism glasses with a total of 6 base out correction and 2 prism diopters  right hypertropia correction demonstrated an esodeviation of 2 prism diopters in primary gaze right gaze upgaze downgaze which increased slightly to 4 prism diopters in left gaze.  She was fusing in all gaze positions with her prism glasses today without diplopia.  There is no nystagmus in any gaze position.    For further exam details, please feel free to contact our office for additional records.  If you wish to contact me regarding this patient please email me at [email or web address] or give my clinic a call to arrange a phone conversation.  Sincerely,    Kal Padilla MD  , Neuro-Ophthalmology and Adult Strabismus Surgery  The Kavita STRICKLAND and Sangeeta Ruelas Chair in Neuro-Ophthalmology  Department of Ophthalmology and Visual Neurosciences  [facility]    DX: quadrantanopia

## 2022-07-29 DIAGNOSIS — D61.818 PANCYTOPENIA (H): Primary | ICD-10-CM

## 2022-07-29 DIAGNOSIS — N18.4 CKD (CHRONIC KIDNEY DISEASE) STAGE 4, GFR 15-29 ML/MIN (H): ICD-10-CM

## 2022-07-29 DIAGNOSIS — N18.4 ANEMIA OF CHRONIC RENAL FAILURE, STAGE 4 (SEVERE) (H): ICD-10-CM

## 2022-07-29 DIAGNOSIS — D63.1 ANEMIA OF CHRONIC RENAL FAILURE, STAGE 4 (SEVERE) (H): ICD-10-CM

## 2022-07-29 LAB — COPPER SERPL-MCNC: 95.7 UG/DL

## 2022-07-29 RX ORDER — ALBUTEROL SULFATE 90 UG/1
1-2 AEROSOL, METERED RESPIRATORY (INHALATION)
Status: CANCELLED
Start: 2022-08-22

## 2022-07-29 RX ORDER — EPINEPHRINE 1 MG/ML
0.3 INJECTION, SOLUTION INTRAMUSCULAR; SUBCUTANEOUS EVERY 5 MIN PRN
Status: CANCELLED | OUTPATIENT
Start: 2022-08-22

## 2022-07-29 RX ORDER — DIPHENHYDRAMINE HYDROCHLORIDE 50 MG/ML
50 INJECTION INTRAMUSCULAR; INTRAVENOUS
Status: CANCELLED
Start: 2022-08-22

## 2022-07-29 RX ORDER — ALBUTEROL SULFATE 0.83 MG/ML
2.5 SOLUTION RESPIRATORY (INHALATION)
Status: CANCELLED | OUTPATIENT
Start: 2022-08-22

## 2022-07-29 RX ORDER — MEPERIDINE HYDROCHLORIDE 25 MG/ML
25 INJECTION INTRAMUSCULAR; INTRAVENOUS; SUBCUTANEOUS EVERY 30 MIN PRN
Status: CANCELLED | OUTPATIENT
Start: 2022-08-22

## 2022-07-29 RX ORDER — METHYLPREDNISOLONE SODIUM SUCCINATE 125 MG/2ML
125 INJECTION, POWDER, LYOPHILIZED, FOR SOLUTION INTRAMUSCULAR; INTRAVENOUS
Status: CANCELLED
Start: 2022-08-22

## 2022-08-07 ENCOUNTER — HOSPITAL ENCOUNTER (OUTPATIENT)
Dept: MRI IMAGING | Facility: CLINIC | Age: 77
Discharge: HOME | End: 2022-08-07
Attending: OPHTHALMOLOGY
Payer: COMMERCIAL

## 2022-08-07 ENCOUNTER — HEALTH MAINTENANCE LETTER (OUTPATIENT)
Age: 77
End: 2022-08-07

## 2022-08-07 DIAGNOSIS — H53.462 HOMONYMOUS BILATERAL FIELD DEFECTS IN VISUAL FIELD, LEFT: ICD-10-CM

## 2022-08-07 DIAGNOSIS — H53.10 SUBJECTIVE VISUAL DISTURBANCE: ICD-10-CM

## 2022-08-07 DIAGNOSIS — H53.2 DOUBLE VISION: ICD-10-CM

## 2022-08-07 PROCEDURE — A9585 GADOBUTROL INJECTION: HCPCS | Performed by: OPHTHALMOLOGY

## 2022-08-07 PROCEDURE — 70553 MRI BRAIN STEM W/O & W/DYE: CPT

## 2022-08-07 PROCEDURE — 255N000002 HC RX 255 OP 636: Performed by: OPHTHALMOLOGY

## 2022-08-07 PROCEDURE — 70544 MR ANGIOGRAPHY HEAD W/O DYE: CPT

## 2022-08-07 PROCEDURE — 70549 MR ANGIOGRAPH NECK W/O&W/DYE: CPT

## 2022-08-07 RX ORDER — GADOBUTROL 604.72 MG/ML
10 INJECTION INTRAVENOUS ONCE
Status: COMPLETED | OUTPATIENT
Start: 2022-08-07 | End: 2022-08-07

## 2022-08-07 RX ADMIN — GADOBUTROL 10 ML: 604.72 INJECTION INTRAVENOUS at 15:08

## 2022-08-09 ENCOUNTER — TELEPHONE (OUTPATIENT)
Dept: OPHTHALMOLOGY | Facility: CLINIC | Age: 77
End: 2022-08-09

## 2022-08-09 NOTE — TELEPHONE ENCOUNTER
Called patient to discuss MRI results at both home phone and mobile phone twice without response. Left voicemail requesting call back.

## 2022-08-09 NOTE — TELEPHONE ENCOUNTER
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 Explained that this stroke is different from her previous \"mini-stroke\" in 2020 and is the source of her vision and neurologic changes. Instructed her to follow up with neurology for a stroke evaluation as soon as possible in the outpatient setting. Our  has reached out to the neurology clinic to help arrange this and the patient awaits a call. Also messaged  to request scheduling neuro-ophthalmology follow up in 2-3 months. Patient understood and agreed with the plan.    Ellen Pearson MD  Resident Physician - PGY3  Department of Ophthalmology   [facility]    "

## 2022-08-11 ENCOUNTER — TRANSFERRED RECORDS (OUTPATIENT)
Dept: HEALTH INFORMATION MANAGEMENT | Facility: CLINIC | Age: 77
End: 2022-08-11

## 2022-08-15 ENCOUNTER — TELEPHONE (OUTPATIENT)
Dept: OPHTHALMOLOGY | Facility: CLINIC | Age: 77
End: 2022-08-15

## 2022-08-15 NOTE — TELEPHONE ENCOUNTER
LVM with patient regarding appointment with Dr. Padilla. Confirmed date/time/location    Provided direct line for call back    Aracelis Pradhan on 8/15/2022 at 9:23 AM

## 2022-08-19 NOTE — PROGRESS NOTES
Hematology clinic visit  In person visit    Problem list:  - Aplastic anemia-diagnosed 9/26/2016, repeat bone marrow biopsy 12/22/16- bone marrow <5% cellular with no dysplasia.  Normal cytogenetics. PNH negative. 1/9/ 2017-treated with ATG, methylprednisolone, cyclosporine, and eltrombopag.  Cyclosporine discontinued May 2021, eltrombopag discontinued June 2021 due to abnormal liver function test.    Partial remission with the immunotherapy, which is gradually weaned.  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  - Shingles right face  2022     is here for follow-up of aplastic anemia.  She was treated with ATG, methylprednisolone, cyclosporine, eltrombopag starting in January 2016, with a partial remission.  The cyclosporine was discontinued May 2021 due to renal insufficiency.  She was on eltrombopag for the platelet count, that was discontinued in June 2021 because of abnormal liver function tests.  Since her diagnosis in 2016, she has intermittently needed red blood cell transfusions.  She is also received Venofer for iron deficiency in 21/22.  She has not been on erythropoietin injections.    Interval history:   Her weight loss continues to be her primary concern. She has lost an additional 2 pounds since her visit last month. She continues to have chronic diarrhea, this started around January.  She has an appointment with her PCP on Monday to discuss her weight loss concerns. She has noticed some fatigue and weakness recently. Denies dizziness or shortness of breath.   No epistaxis, melena, hematochezia, hematuria, vaginal bleeding.  She is taking apixaban (Eliquis) 5 mg twice daily since she had recurrent DVT in March 2021. Denies fevers, chills, night sweats, headaches, new lumps or bumps, chest pain, shortness of breath, cough, abdominal pain, nausea, vomiting, swelling of extremities, bleeding issues, or rash.      Medications     Current Outpatient Medications   Medication     apixaban ANTICOAGULANT (ELIQUIS) 2.5 MG tablet     Cyanocobalamin (VITAMIN B-12 PO)     diphenhydrAMINE (BENADRYL) 25 MG tablet     pantoprazole (PROTONIX) 20 MG EC tablet     psyllium (METAMUCIL/KONSYL) 58.6 % powder     loperamide (IMODIUM A-D) 2 MG tablet     multivitamin w/minerals (THERA-VIT-M) tablet     No current facility-administered medications for this visit.         PHYSICAL EXAMINATION:  /71   Pulse 93   Temp 97.6  F (36.4  C) (Oral)   Resp 16   Wt 43.5 kg (96 lb)   SpO2 98%   BMI 17.56 kg/m      Gen: alert, pleasant and conversational, frail, pale  HEENT: NC/AT,EOMI w/ PERRL, anicteric sclera. OP clear. MMM.   Neck: Supple, no LAD  CV: normal S1,S2 with RRR no m/r/g  Resp: lungs CTA bilaterally with adequate air movement to bases. No wheezes or crackles  Abd: soft NTND no organomegaly or masses. BS normoactive.   Ext: warm and well perfused, no edema or cyanosis  Skin: no concerning lesions or rashes  Neuro: A&Ox4, no lateralizing sx. Grossly nonfocal.  Psych: appropriate, reactive      Labs:  I personally reviewed the following labs:    Most Recent 3 CBC's:  Recent Labs   Lab Test 08/22/22  1336 07/26/22  1123 07/20/22  1043   WBC 1.6* 2.2* 1.9*   HGB 6.8* 6.9* 7.3*   * 107* 106*   PLT 78* 111* 93*     Most Recent 3 BMP's:  Recent Labs   Lab Test 08/22/22  1336 07/26/22  1123 07/08/22  1103    139 140   POTASSIUM 3.7 4.0 4.3   CHLORIDE 109 107 108   CO2 25 27 26   BUN 20 22 25   CR 1.13* 1.15* 1.26*   ANIONGAP 7 5 6   LEO 8.8 8.4* 8.9   * 110* 106*      Most Recent 2 LFT's:  Recent Labs   Lab Test 08/22/22  1336 07/26/22  1123   AST 14 18   ALT 11 14   ALKPHOS 87 100   BILITOTAL 0.4 0.5       Assessment and Recommendation:  #Aplastic anemia- She has mild neutropenia and thrombocytopenia, which at this point are not of clinical significance.  The bigger issue is the anemia.  She has been requiring some transfusions.  Some of the anemia may be related to chronic kidney disease along with the aplastic anemia. PNH and MGUS were checked and did not show much change, so it was decided she may benefit from Aranesp.  She will also need to be set up for a blood transfusion in the next 1-2 days.  - Start Aranesp 100 mcg subcu every 3 weeks for hemoglobin less than 10 g/dL, first dose in clinic today.,  Injection in 3 weeks can be at [facility], 6 week injection will be after her appointment with Dr. Spann.  - Transfuse 1 unit packed red blood cells sometime this week      #History of recurrent unprovoked DVT and PE-on apixaban (Eliquis) 5 mg twice daily.  She has not had any significant bleeding symptoms other than senile purpura.  Since she is been anticoagulated for more than 6 months, and her weight is less than 50 kg and she has chronic kidney disease (that is probably worse than indicated by the GFR which does not take into account her very low weight), she should remain on a lower dose of Eliquis 2.5 mg twice daily.  - Continue apixaban 2.5 mg twice daily    #Unexplained weight loss-she weighed 53.9 kg on 12/9/2021, she is now 43.5 kg today.  She has had the diarrhea worked up in the past, negative for infectious agents.  She has a PCP appointment in 1 week to discuss this.    #Chronic kidney disease- creatinine 1.13 with GFR estimate 50   - Urine protein 0.11 on 7/26/22  - Consider nephrology referral    RTC 9/12 for labs and Aranesp  RTC 10/5/2022- Zana with labs and Aranesp    56 minutes spent on the date of the encounter doing chart review, review of test  results, patient visit and documentation     KHADRA Sweeney [facility]  [address] [place] [address] [phone]

## 2022-08-21 LAB
ABO/RH(D): NORMAL
ANTIBODY SCREEN: NEGATIVE
SPECIMEN EXPIRATION DATE: NORMAL

## 2022-08-22 ENCOUNTER — APPOINTMENT (OUTPATIENT)
Dept: LAB | Facility: CLINIC | Age: 77
End: 2022-08-22
Attending: REGISTERED NURSE
Payer: COMMERCIAL

## 2022-08-22 ENCOUNTER — ONCOLOGY VISIT (OUTPATIENT)
Dept: ONCOLOGY | Facility: CLINIC | Age: 77
End: 2022-08-22
Attending: REGISTERED NURSE
Payer: COMMERCIAL

## 2022-08-22 VITALS
SYSTOLIC BLOOD PRESSURE: 109 MMHG | OXYGEN SATURATION: 98 % | RESPIRATION RATE: 16 BRPM | TEMPERATURE: 97.6 F | WEIGHT: 96 LBS | BODY MASS INDEX: 17.56 KG/M2 | DIASTOLIC BLOOD PRESSURE: 71 MMHG | HEART RATE: 93 BPM

## 2022-08-22 DIAGNOSIS — D61.818 PANCYTOPENIA (H): Primary | ICD-10-CM

## 2022-08-22 DIAGNOSIS — N18.4 ANEMIA OF CHRONIC RENAL FAILURE, STAGE 4 (SEVERE) (H): ICD-10-CM

## 2022-08-22 DIAGNOSIS — D63.1 ANEMIA OF CHRONIC RENAL FAILURE, STAGE 4 (SEVERE) (H): ICD-10-CM

## 2022-08-22 DIAGNOSIS — N18.4 CKD (CHRONIC KIDNEY DISEASE) STAGE 4, GFR 15-29 ML/MIN (H): ICD-10-CM

## 2022-08-22 DIAGNOSIS — D61.9 APLASTIC ANEMIA (H): ICD-10-CM

## 2022-08-22 LAB
ALBUMIN SERPL-MCNC: 2.3 G/DL (ref 3.4–5)
ALP SERPL-CCNC: 87 U/L (ref 40–150)
ALT SERPL W P-5'-P-CCNC: 11 U/L (ref 0–50)
ANION GAP SERPL CALCULATED.3IONS-SCNC: 7 MMOL/L (ref 3–14)
AST SERPL W P-5'-P-CCNC: 14 U/L (ref 0–45)
BASOPHILS # BLD MANUAL: 0 10E3/UL (ref 0–0.2)
BASOPHILS NFR BLD MANUAL: 1 %
BILIRUB SERPL-MCNC: 0.4 MG/DL (ref 0.2–1.3)
BUN SERPL-MCNC: 20 MG/DL (ref 7–30)
CALCIUM SERPL-MCNC: 8.8 MG/DL (ref 8.5–10.1)
CHLORIDE BLD-SCNC: 109 MMOL/L (ref 94–109)
CO2 SERPL-SCNC: 25 MMOL/L (ref 20–32)
CREAT SERPL-MCNC: 1.13 MG/DL (ref 0.52–1.04)
EOSINOPHIL # BLD MANUAL: 0 10E3/UL (ref 0–0.7)
EOSINOPHIL NFR BLD MANUAL: 0 %
ERYTHROCYTE [DISTWIDTH] IN BLOOD BY AUTOMATED COUNT: 20.3 % (ref 10–15)
GFR SERPL CREATININE-BSD FRML MDRD: 50 ML/MIN/1.73M2
GLUCOSE BLD-MCNC: 107 MG/DL (ref 70–99)
HCT VFR BLD AUTO: 23.2 % (ref 35–47)
HGB BLD-MCNC: 6.8 G/DL (ref 11.7–15.7)
LYMPHOCYTES # BLD MANUAL: 1 10E3/UL (ref 0.8–5.3)
LYMPHOCYTES NFR BLD MANUAL: 62 %
MCH RBC QN AUTO: 31.9 PG (ref 26.5–33)
MCHC RBC AUTO-ENTMCNC: 29.3 G/DL (ref 31.5–36.5)
MCV RBC AUTO: 109 FL (ref 78–100)
MONOCYTES # BLD MANUAL: 0.2 10E3/UL (ref 0–1.3)
MONOCYTES NFR BLD MANUAL: 12 %
NEUTROPHILS # BLD MANUAL: 0.4 10E3/UL (ref 1.6–8.3)
NEUTROPHILS NFR BLD MANUAL: 25 %
NRBC # BLD AUTO: 0 10E3/UL
NRBC BLD MANUAL-RTO: 1 %
PLAT MORPH BLD: ABNORMAL
PLATELET # BLD AUTO: 78 10E3/UL (ref 150–450)
POLYCHROMASIA BLD QL SMEAR: SLIGHT
POTASSIUM BLD-SCNC: 3.7 MMOL/L (ref 3.4–5.3)
PROT SERPL-MCNC: 7.2 G/DL (ref 6.8–8.8)
RBC # BLD AUTO: 2.13 10E6/UL (ref 3.8–5.2)
RBC MORPH BLD: ABNORMAL
SODIUM SERPL-SCNC: 141 MMOL/L (ref 133–144)
WBC # BLD AUTO: 1.6 10E3/UL (ref 4–11)

## 2022-08-22 PROCEDURE — 85007 BL SMEAR W/DIFF WBC COUNT: CPT | Performed by: REGISTERED NURSE

## 2022-08-22 PROCEDURE — G0463 HOSPITAL OUTPT CLINIC VISIT: HCPCS

## 2022-08-22 PROCEDURE — 86901 BLOOD TYPING SEROLOGIC RH(D): CPT | Performed by: REGISTERED NURSE

## 2022-08-22 PROCEDURE — 86850 RBC ANTIBODY SCREEN: CPT | Performed by: REGISTERED NURSE

## 2022-08-22 PROCEDURE — 99215 OFFICE O/P EST HI 40 MIN: CPT | Performed by: REGISTERED NURSE

## 2022-08-22 PROCEDURE — 96372 THER/PROPH/DIAG INJ SC/IM: CPT | Performed by: INTERNAL MEDICINE

## 2022-08-22 PROCEDURE — 250N000011 HC RX IP 250 OP 636: Performed by: INTERNAL MEDICINE

## 2022-08-22 PROCEDURE — G0463 HOSPITAL OUTPT CLINIC VISIT: HCPCS | Mod: 25

## 2022-08-22 PROCEDURE — 80053 COMPREHEN METABOLIC PANEL: CPT | Performed by: REGISTERED NURSE

## 2022-08-22 PROCEDURE — 36415 COLL VENOUS BLD VENIPUNCTURE: CPT | Performed by: REGISTERED NURSE

## 2022-08-22 PROCEDURE — 85027 COMPLETE CBC AUTOMATED: CPT | Performed by: REGISTERED NURSE

## 2022-08-22 RX ORDER — ALBUTEROL SULFATE 90 UG/1
1-2 AEROSOL, METERED RESPIRATORY (INHALATION)
Status: CANCELLED
Start: 2022-08-22

## 2022-08-22 RX ORDER — DIPHENHYDRAMINE HYDROCHLORIDE 50 MG/ML
50 INJECTION INTRAMUSCULAR; INTRAVENOUS
Status: CANCELLED
Start: 2022-08-22

## 2022-08-22 RX ORDER — EPINEPHRINE 1 MG/ML
0.3 INJECTION, SOLUTION INTRAMUSCULAR; SUBCUTANEOUS EVERY 5 MIN PRN
Status: CANCELLED | OUTPATIENT
Start: 2022-08-22

## 2022-08-22 RX ORDER — METHYLPREDNISOLONE SODIUM SUCCINATE 125 MG/2ML
125 INJECTION, POWDER, LYOPHILIZED, FOR SOLUTION INTRAMUSCULAR; INTRAVENOUS
Status: CANCELLED
Start: 2022-08-22

## 2022-08-22 RX ORDER — ALBUTEROL SULFATE 0.83 MG/ML
2.5 SOLUTION RESPIRATORY (INHALATION)
Status: CANCELLED | OUTPATIENT
Start: 2022-08-22

## 2022-08-22 RX ORDER — MEPERIDINE HYDROCHLORIDE 25 MG/ML
25 INJECTION INTRAMUSCULAR; INTRAVENOUS; SUBCUTANEOUS EVERY 30 MIN PRN
Status: CANCELLED | OUTPATIENT
Start: 2022-08-22

## 2022-08-22 RX ADMIN — DARBEPOETIN ALFA 100 MCG: 100 INJECTION, SOLUTION INTRAVENOUS; SUBCUTANEOUS at 14:25

## 2022-08-22 ASSESSMENT — PAIN SCALES - GENERAL: PAINLEVEL: NO PAIN (0)

## 2022-08-22 NOTE — NURSING NOTE
Chief Complaint   Patient presents with     Blood Draw     Labs drawn via  by RN. VS taken.     Labs drawn with  by rn.  Pt tolerated well.  VS taken.  Pt checked in for next appt.    Sajan Villagran RN

## 2022-08-22 NOTE — LETTER
8/22/2022         RE: Bonny Raymundo  [address]  [facility] [id number]        Dear Colleague,    Thank you for referring your patient, Bonny Raymundo, to the [facility]. Please see a copy of my visit note below.    Hematology clinic visit  In person visit    Problem list:  - Aplastic anemia-diagnosed 9/26/2016, repeat bone marrow biopsy 12/22/16- bone marrow <5% cellular with no dysplasia.  Normal cytogenetics. PNH negative. 1/9/ 2017-treated with ATG, methylprednisolone, cyclosporine, and eltrombopag.  Cyclosporine discontinued May 2021, eltrombopag discontinued June 2021 due to abnormal liver function test.    Partial remission with the immunotherapy, which is gradually weaned.  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  - Shingles right face  2022     is here for follow-up of aplastic anemia.  She was treated with ATG, methylprednisolone, cyclosporine, eltrombopag starting in January 2016, with a partial remission.  The cyclosporine was discontinued May 2021 due to renal insufficiency.  She was on eltrombopag for the platelet count, that was discontinued in June 2021 because of abnormal liver function tests.  Since her diagnosis in 2016, she has intermittently needed red blood cell transfusions.  She is also received Venofer for iron deficiency in 21/22.  She has not been on erythropoietin injections.    Interval history:   Her weight loss continues to be her primary concern. She has lost an additional 2 pounds since her  visit last month. She continues to have chronic diarrhea, this started around January.  She has an appointment with her PCP on Monday to discuss her weight loss concerns. She has noticed some fatigue and weakness recently. Denies dizziness or shortness of breath.  No epistaxis, melena, hematochezia, hematuria, vaginal bleeding.  She is taking apixaban (Eliquis) 5 mg twice daily since she had recurrent DVT in March 2021. Denies fevers, chills, night sweats, headaches, new lumps or bumps, chest pain, shortness of breath, cough, abdominal pain, nausea, vomiting, swelling of extremities, bleeding issues, or rash.      Medications     Current Outpatient Medications   Medication     apixaban ANTICOAGULANT (ELIQUIS) 2.5 MG tablet     Cyanocobalamin (VITAMIN B-12 PO)     diphenhydrAMINE (BENADRYL) 25 MG tablet     pantoprazole (PROTONIX) 20 MG EC tablet     psyllium (METAMUCIL/KONSYL) 58.6 % powder     loperamide (IMODIUM A-D) 2 MG tablet     multivitamin w/minerals (THERA-VIT-M) tablet     No current facility-administered medications for this visit.         PHYSICAL EXAMINATION:  /71   Pulse 93   Temp 97.6  F (36.4  C) (Oral)   Resp 16   Wt 43.5 kg (96 lb)   SpO2 98%   BMI 17.56 kg/m      Gen: alert, pleasant and conversational, frail, pale  HEENT: NC/AT,EOMI w/ PERRL, anicteric sclera. OP clear. MMM.   Neck: Supple, no LAD  CV: normal S1,S2 with RRR no m/r/g  Resp: lungs CTA bilaterally with adequate air movement to bases. No wheezes or crackles  Abd: soft NTND no organomegaly or masses. BS normoactive.   Ext: warm and well perfused, no edema or cyanosis  Skin: no concerning lesions or rashes  Neuro: A&Ox4, no lateralizing sx. Grossly nonfocal.  Psych: appropriate, reactive      Labs:  I personally reviewed the following labs:    Most Recent 3 CBC's:  Recent Labs   Lab Test 08/22/22  1336 07/26/22  1123 07/20/22  1043   WBC 1.6* 2.2* 1.9*   HGB 6.8* 6.9* 7.3*   * 107* 106*   PLT 78* 111* 93*     Most  Recent 3 BMP's:  Recent Labs   Lab Test 08/22/22  1336 07/26/22  1123 07/08/22  1103    139 140   POTASSIUM 3.7 4.0 4.3   CHLORIDE 109 107 108   CO2 25 27 26   BUN 20 22 25   CR 1.13* 1.15* 1.26*   ANIONGAP 7 5 6   LEO 8.8 8.4* 8.9   * 110* 106*     Most Recent 2 LFT's:  Recent Labs   Lab Test 08/22/22  1336 07/26/22  1123   AST 14 18   ALT 11 14   ALKPHOS 87 100   BILITOTAL 0.4 0.5       Assessment and Recommendation:  #Aplastic anemia- She has mild neutropenia and thrombocytopenia, which at this point are not of clinical significance.  The bigger issue is the anemia.  She has been requiring some transfusions.  Some of the anemia may be related to chronic kidney disease along with the aplastic anemia. PNH and MGUS were checked and did not show much change, so it was decided she may benefit from Aranesp.  She will also need to be set up for a blood transfusion in the next 1-2 days.  - Start Aranesp 100 mcg subcu every 3 weeks for hemoglobin less than 10 g/dL, first dose in clinic today.,  Injection in 3 weeks can be at [facility], 6 week injection will be after her appointment with Dr. Spann.  - Transfuse 1 unit packed red blood cells sometime this week      #History of recurrent unprovoked DVT and PE-on apixaban (Eliquis) 5 mg twice daily.  She has not had any significant bleeding symptoms other than senile purpura.  Since she is been anticoagulated for more than 6 months, and her weight is less than 50 kg and she has chronic kidney disease (that is probably worse than indicated by the GFR which does not take into account her very low weight), she should remain on a lower dose of Eliquis 2.5 mg twice daily.  - Continue apixaban 2.5 mg twice daily    #Unexplained weight loss-she weighed 53.9 kg on 12/9/2021, she is now 43.5 kg today.  She has had the diarrhea worked up in the past, negative for infectious agents.  She has a PCP appointment in 1 week to discuss this.    #Chronic kidney disease- creatinine  1.13 with GFR estimate 50   - Urine protein 0.11 on 7/26/22  - Consider nephrology referral    RTC 9/12 for labs and Aranesp  RTC 10/5/2022- Zana with labs and Aranesp    56 minutes spent on the date of the encounter doing chart review, review of test results, patient visit and documentation         Again, thank you for allowing me to participate in the care of your patient.      Sincerely,    KHADRA Sweeney CNP

## 2022-08-22 NOTE — NURSING NOTE
100 mcg of aranesp given to pt per Yeni B orders. Pt stated it was her first time receiving this injection. Last name and D.O.B verified with pt. Checked MAR and verified. Pt stated she felt well after injection. Injection given in Lower right abdomen.15 min observation done on patient, no reaction. See MAR for more detail.     /71   Pulse 93   Temp 97.6  F (36.4  C) (Oral)   Resp 16   Wt 43.5 kg (96 lb)   SpO2 98%   BMI 17.56 kg/m      Amy Abreu CMA on 8/22/2022 at 2:33 PM

## 2022-08-22 NOTE — NURSING NOTE
"Oncology Rooming Note    August 22, 2022 1:43 PM   Bonny Raymundo is a 76 year old female who presents for:    Chief Complaint   Patient presents with     Blood Draw     Labs drawn via  by RN. VS taken.     Oncology Clinic Visit     Aplastic anemia      Initial Vitals: /71   Pulse 93   Temp 97.6  F (36.4  C) (Oral)   Resp 16   Wt 43.5 kg (96 lb)   SpO2 98%   BMI 17.56 kg/m   Estimated body mass index is 17.56 kg/m  as calculated from the following:    Height as of 4/28/22: 1.575 m (5' 2\").    Weight as of this encounter: 43.5 kg (96 lb). Body surface area is 1.38 meters squared.  No Pain (0) Comment: Data Unavailable   No LMP recorded. Patient has had a hysterectomy.  Allergies reviewed: Yes  Medications reviewed: Yes    Medications: Medication refills not needed today.  Pharmacy name entered into [facility]:    [facility] [place], [place] - [address], [address]  [facility]  [place], [place] - [address] [place] [address]/[id number] - [place], [place] - [address] [facility]    Clinical concerns: NO new concerns per pt.       Amy Abreu CMA            "

## 2022-08-24 RX ORDER — HEPARIN SODIUM (PORCINE) LOCK FLUSH IV SOLN 100 UNIT/ML 100 UNIT/ML
5 SOLUTION INTRAVENOUS
Status: CANCELLED | OUTPATIENT
Start: 2022-08-24

## 2022-08-24 RX ORDER — HEPARIN SODIUM,PORCINE 10 UNIT/ML
5 VIAL (ML) INTRAVENOUS
Status: CANCELLED | OUTPATIENT
Start: 2022-08-24

## 2022-08-25 ENCOUNTER — INFUSION THERAPY VISIT (OUTPATIENT)
Dept: INFUSION THERAPY | Facility: CLINIC | Age: 77
End: 2022-08-25
Attending: NURSE PRACTITIONER
Payer: COMMERCIAL

## 2022-08-25 VITALS
SYSTOLIC BLOOD PRESSURE: 123 MMHG | OXYGEN SATURATION: 98 % | HEART RATE: 66 BPM | RESPIRATION RATE: 18 BRPM | DIASTOLIC BLOOD PRESSURE: 85 MMHG | TEMPERATURE: 98 F

## 2022-08-25 DIAGNOSIS — D61.9 APLASTIC ANEMIA (H): Primary | ICD-10-CM

## 2022-08-25 DIAGNOSIS — D61.818 PANCYTOPENIA (H): ICD-10-CM

## 2022-08-25 PROCEDURE — 86923 COMPATIBILITY TEST ELECTRIC: CPT | Performed by: INTERNAL MEDICINE

## 2022-08-25 PROCEDURE — 36430 TRANSFUSION BLD/BLD COMPNT: CPT

## 2022-08-25 PROCEDURE — P9016 RBC LEUKOCYTES REDUCED: HCPCS | Performed by: INTERNAL MEDICINE

## 2022-08-25 NOTE — PROGRESS NOTES
Infusion Nursing Note:  Bonny Raymundo presents today for 1 unit PRBC.    Patient seen by provider today: No   present during visit today: Not Applicable.    Note: Patient c/o weakness and fatigue. She also c/o ongoing diarrhea.    Intravenous Access:  Peripheral IV placed.    Treatment Conditions:  Lab Results   Component Value Date    HGB 6.8 (LL) 08/22/2022    WBC 1.6 (L) 08/22/2022    ANEU 0.4 (LL) 08/22/2022    ANEUTAUTO 1.0 (L) 06/27/2022    PLT 78 (L) 08/22/2022      Results reviewed, labs MET treatment parameters, ok to proceed with treatment.  Blood transfusion consent signed 6/4/2022.    Post Infusion Assessment:  Patient tolerated infusion without incident.  Blood return noted pre and post infusion.  Site patent and intact, free from redness, edema or discomfort.  No evidence of extravasations.  Access discontinued per protocol.     Discharge Plan:   Patient declined prescription refills.  Discharge instructions reviewed with: Patient.  Patient and/or family verbalized understanding of discharge instructions and all questions answered.  AVS to patient via TappnGoT.  Patient will return 9/12 for next appointment.   Patient discharged in stable condition accompanied by: self.  Departure Mode: Ambulatory.      Rohith Bell RN

## 2022-08-31 ENCOUNTER — OFFICE VISIT (OUTPATIENT)
Dept: FAMILY MEDICINE | Facility: CLINIC | Age: 77
End: 2022-08-31
Payer: COMMERCIAL

## 2022-08-31 VITALS
RESPIRATION RATE: 16 BRPM | TEMPERATURE: 97.7 F | BODY MASS INDEX: 17.61 KG/M2 | DIASTOLIC BLOOD PRESSURE: 72 MMHG | WEIGHT: 95.7 LBS | OXYGEN SATURATION: 99 % | HEIGHT: 62 IN | HEART RATE: 91 BPM | SYSTOLIC BLOOD PRESSURE: 118 MMHG

## 2022-08-31 DIAGNOSIS — H90.3 SENSORINEURAL HEARING LOSS OF BOTH EARS: ICD-10-CM

## 2022-08-31 DIAGNOSIS — I82.402 RECURRENT ACUTE DEEP VEIN THROMBOSIS (DVT) OF LEFT LOWER EXTREMITY (H): ICD-10-CM

## 2022-08-31 DIAGNOSIS — D61.9 APLASTIC ANEMIA (H): ICD-10-CM

## 2022-08-31 DIAGNOSIS — N18.4 CKD (CHRONIC KIDNEY DISEASE) STAGE 4, GFR 15-29 ML/MIN (H): ICD-10-CM

## 2022-08-31 DIAGNOSIS — H81.13 BENIGN PAROXYSMAL POSITIONAL VERTIGO DUE TO BILATERAL VESTIBULAR DISORDER: ICD-10-CM

## 2022-08-31 DIAGNOSIS — Z86.711 HISTORY OF PULMONARY EMBOLISM: ICD-10-CM

## 2022-08-31 DIAGNOSIS — R19.7 DIARRHEA, UNSPECIFIED TYPE: Primary | ICD-10-CM

## 2022-08-31 PROBLEM — J44.9 COPD (CHRONIC OBSTRUCTIVE PULMONARY DISEASE) (H): Status: RESOLVED | Noted: 2021-03-24 | Resolved: 2022-08-31

## 2022-08-31 PROCEDURE — 99214 OFFICE O/P EST MOD 30 MIN: CPT | Performed by: INTERNAL MEDICINE

## 2022-08-31 NOTE — PROGRESS NOTES
Assessment & Plan     Diarrhea, unspecified type  Refer to GI for evaluation  - Adult GI  Referral - Consult Only; Future    Sensorineural hearing loss of both ears  Already has appt with audiology/ent. She has hx of ear tubes due to fluid behind TM. Currently has b/l hearing aids still very hard of hearing.     Recurrent acute deep vein thrombosis (DVT) of left lower extremity (H)  On eliquis.     Aplastic anemia (H)  Follows with hematology. Plans for iron infusions and repeat lab work.    CKD (chronic kidney disease) stage 4, GFR 15-29 ml/min (H)  - ACE/ARB/ARNI NOT PRESCRIBED (INTENTIONAL); Please choose reason not prescribed from choices below.  - Adult Nephrology  Referral; Future    History of pulmonary embolism  On eliquis.    Benign paroxysmal positional vertigo due to bilateral vestibular disorder  Stable at this time.      See Patient Instructions    Return in about 3 months (around 11/30/2022) for Follow up, Routine preventive, with me.    RUSSELL GOODMAN MD  [facility]    Edilson Draper is a 76 year old, presenting for the following health issues:  Establish Care      HPI     Bonny is a very pleasant 76 year old lady who presented to the clinic to establish care,   She has extensive medical hx of aplastic anemia, she follows with hematology. Most recent blood work showed hgb < 7 and they planned to administer iron infusions and possible blood transfusion.   She has stage 4 CKD and does not see a nephrologist.   She has bilateral hearing loss and has appt with audiology coming up. She has hx of shingles on right side of forehead and had post herpetic neuralgia. Currently she only has some itching in the area.  She is concerned about weight loss. In Jan 2022 she weighed around 111 lbs and it dropped to 95 lbs today. She does not have much appetite.   She has diarrhea for almost a year and takes half a tablet of imodium daily.     Review of Systems       Objective   "  /72 (BP Location: Right arm, Patient Position: Sitting, Cuff Size: Adult Small)   Pulse 91   Temp 97.7  F (36.5  C) (Tympanic)   Resp 16   Ht 1.575 m (5' 2\")   Wt 43.4 kg (95 lb 11.2 oz)   SpO2 99%   BMI 17.50 kg/m    Body mass index is 17.5 kg/m .  Physical Exam  Vitals reviewed.   HENT:      Right Ear: Tympanic membrane normal. There is no impacted cerumen.      Left Ear: Tympanic membrane normal. There is no impacted cerumen.                .  ..  "

## 2022-08-31 NOTE — PATIENT INSTRUCTIONS
Stop taking vitamin B12 supplement     Please check the dose of vitamin D3 and send a mychart message.

## 2022-09-01 ENCOUNTER — TRANSFERRED RECORDS (OUTPATIENT)
Dept: FAMILY MEDICINE | Facility: CLINIC | Age: 77
End: 2022-09-01

## 2022-09-12 ENCOUNTER — INFUSION THERAPY VISIT (OUTPATIENT)
Dept: INFUSION THERAPY | Facility: CLINIC | Age: 77
End: 2022-09-12
Payer: COMMERCIAL

## 2022-09-12 ENCOUNTER — LAB (OUTPATIENT)
Dept: INFUSION THERAPY | Facility: CLINIC | Age: 77
End: 2022-09-12
Payer: COMMERCIAL

## 2022-09-12 VITALS
DIASTOLIC BLOOD PRESSURE: 64 MMHG | HEART RATE: 83 BPM | OXYGEN SATURATION: 99 % | TEMPERATURE: 97.5 F | SYSTOLIC BLOOD PRESSURE: 110 MMHG | RESPIRATION RATE: 18 BRPM

## 2022-09-12 DIAGNOSIS — N18.4 CKD (CHRONIC KIDNEY DISEASE) STAGE 4, GFR 15-29 ML/MIN (H): Primary | ICD-10-CM

## 2022-09-12 DIAGNOSIS — N18.4 ANEMIA OF CHRONIC RENAL FAILURE, STAGE 4 (SEVERE) (H): ICD-10-CM

## 2022-09-12 DIAGNOSIS — D63.1 ANEMIA OF CHRONIC RENAL FAILURE, STAGE 4 (SEVERE) (H): ICD-10-CM

## 2022-09-12 DIAGNOSIS — N18.4 CKD (CHRONIC KIDNEY DISEASE) STAGE 4, GFR 15-29 ML/MIN (H): ICD-10-CM

## 2022-09-12 DIAGNOSIS — D61.3 IDIOPATHIC APLASTIC ANEMIA (H): ICD-10-CM

## 2022-09-12 DIAGNOSIS — R63.4 WEIGHT LOSS, UNINTENTIONAL: ICD-10-CM

## 2022-09-12 LAB
BASOPHILS # BLD AUTO: 0 10E3/UL (ref 0–0.2)
BASOPHILS NFR BLD AUTO: 0 %
EOSINOPHIL # BLD AUTO: 0 10E3/UL (ref 0–0.7)
EOSINOPHIL NFR BLD AUTO: 0 %
ERYTHROCYTE [DISTWIDTH] IN BLOOD BY AUTOMATED COUNT: 18.9 % (ref 10–15)
HCT VFR BLD AUTO: 25.1 % (ref 35–47)
HGB BLD-MCNC: 7.5 G/DL (ref 11.7–15.7)
IMM GRANULOCYTES # BLD: 0.1 10E3/UL
IMM GRANULOCYTES NFR BLD: 3 %
LYMPHOCYTES # BLD AUTO: 1 10E3/UL (ref 0.8–5.3)
LYMPHOCYTES NFR BLD AUTO: 34 %
MCH RBC QN AUTO: 31.9 PG (ref 26.5–33)
MCHC RBC AUTO-ENTMCNC: 29.9 G/DL (ref 31.5–36.5)
MCV RBC AUTO: 107 FL (ref 78–100)
MONOCYTES # BLD AUTO: 0.6 10E3/UL (ref 0–1.3)
MONOCYTES NFR BLD AUTO: 19 %
NEUTROPHILS # BLD AUTO: 1.3 10E3/UL (ref 1.6–8.3)
NEUTROPHILS NFR BLD AUTO: 44 %
NRBC # BLD AUTO: 0.1 10E3/UL
NRBC BLD AUTO-RTO: 2 /100
PLATELET # BLD AUTO: 80 10E3/UL (ref 150–450)
RBC # BLD AUTO: 2.35 10E6/UL (ref 3.8–5.2)
WBC # BLD AUTO: 3.1 10E3/UL (ref 4–11)

## 2022-09-12 PROCEDURE — 250N000011 HC RX IP 250 OP 636: Mod: EC | Performed by: INTERNAL MEDICINE

## 2022-09-12 PROCEDURE — 85025 COMPLETE CBC W/AUTO DIFF WBC: CPT | Performed by: INTERNAL MEDICINE

## 2022-09-12 PROCEDURE — 96372 THER/PROPH/DIAG INJ SC/IM: CPT | Performed by: INTERNAL MEDICINE

## 2022-09-12 PROCEDURE — 36415 COLL VENOUS BLD VENIPUNCTURE: CPT

## 2022-09-12 RX ORDER — ALBUTEROL SULFATE 90 UG/1
1-2 AEROSOL, METERED RESPIRATORY (INHALATION)
Status: CANCELLED
Start: 2022-09-12

## 2022-09-12 RX ORDER — METHYLPREDNISOLONE SODIUM SUCCINATE 125 MG/2ML
125 INJECTION, POWDER, LYOPHILIZED, FOR SOLUTION INTRAMUSCULAR; INTRAVENOUS
Status: CANCELLED
Start: 2022-09-12

## 2022-09-12 RX ORDER — EPINEPHRINE 1 MG/ML
0.3 INJECTION, SOLUTION INTRAMUSCULAR; SUBCUTANEOUS EVERY 5 MIN PRN
Status: CANCELLED | OUTPATIENT
Start: 2022-09-12

## 2022-09-12 RX ORDER — DIPHENHYDRAMINE HYDROCHLORIDE 50 MG/ML
50 INJECTION INTRAMUSCULAR; INTRAVENOUS
Status: CANCELLED
Start: 2022-09-12

## 2022-09-12 RX ORDER — MEPERIDINE HYDROCHLORIDE 25 MG/ML
25 INJECTION INTRAMUSCULAR; INTRAVENOUS; SUBCUTANEOUS EVERY 30 MIN PRN
Status: CANCELLED | OUTPATIENT
Start: 2022-09-12

## 2022-09-12 RX ORDER — ALBUTEROL SULFATE 0.83 MG/ML
2.5 SOLUTION RESPIRATORY (INHALATION)
Status: CANCELLED | OUTPATIENT
Start: 2022-09-12

## 2022-09-12 RX ADMIN — DARBEPOETIN ALFA 100 MCG: 100 INJECTION, SOLUTION INTRAVENOUS; SUBCUTANEOUS at 16:07

## 2022-09-12 NOTE — PROGRESS NOTES
Medical Assistant Note:  Bonny Raymundo presents today for lab draw.    Patient seen by provider today: No.   present during visit today: Not Applicable.    Concerns: No Concerns.    Procedure:  Lab draw site: RAC, Needle type: BF, Gauge: 21. Gauze and coban applied    Post Assessment:  Labs drawn without difficulty: Yes.    Discharge Plan:  Departure Mode: Ambulatory.    Face to Face Time: 5.    Ramya Zabala CMA

## 2022-09-12 NOTE — PROGRESS NOTES
Infusion Nursing Note:  Bonny Raymundo presents today for  Aranesp.    Patient seen by provider today: No   present during visit today: Not Applicable.    Note: N/A.    Intravenous Access:  No Intravenous access/labs at this visit.    Treatment Conditions:  Results reviewed, labs MET treatment parameters, ok to proceed with treatment.    Post Infusion Assessment:  Patient tolerated injection without incident.     Discharge Plan:   Discharge instructions reviewed with: Patient.  Patient and/or family verbalized understanding of discharge instructions and all questions answered.  AVS to patient via CosharedHART.  Patient discharged in stable condition accompanied by: self.  Departure Mode: Ambulatory.      Carla Nguyen RN

## 2022-09-13 ENCOUNTER — TELEPHONE (OUTPATIENT)
Dept: FAMILY MEDICINE | Facility: CLINIC | Age: 77
End: 2022-09-13

## 2022-09-13 NOTE — TELEPHONE ENCOUNTER
Pt calling with clarification for Dr. Cash.     1. Pt is taking 50,000 international unit(s) of D3. She  takes this every other Monday, so every two weeks.     2. Pt states provider instructed her to stop taking B12 supplement. Pt wants to know why provider recommends this? Is this d/t labs or other reason?    3. Coin assistance number was provided as she is having a difficult time sending messages.     OK to leave detailed vm on either home and/or cell number.     Nga JAIN RN  [facility]

## 2022-09-13 NOTE — TELEPHONE ENCOUNTER
Asked to stop B12 as last time labs were done in April 2022 levels were > 2000   How long has she been taking vitamin D for

## 2022-09-20 NOTE — TELEPHONE ENCOUNTER
Left message for pt of below, and to call us back and give answer for Vitamin D.   This answer can be taken by anyone and sent to Dr. Cash.  Kindra Ruff, RN  [facility] RN Triage Team

## 2022-09-21 NOTE — TELEPHONE ENCOUNTER
Triage Patient Outreach    Attempt # 1    Was call answered?  No.  Left voicemail to return call to Triage at Primary Clinic    Kindra Ruff RN

## 2022-09-22 ENCOUNTER — TRANSFERRED RECORDS (OUTPATIENT)
Dept: HEALTH INFORMATION MANAGEMENT | Facility: CLINIC | Age: 77
End: 2022-09-22

## 2022-09-22 ENCOUNTER — MYC MEDICAL ADVICE (OUTPATIENT)
Dept: FAMILY MEDICINE | Facility: CLINIC | Age: 77
End: 2022-09-22

## 2022-09-22 NOTE — TELEPHONE ENCOUNTER
Patient Contact     Attempt #: 3     Was call answered?  No.  Left detailed message on voicemail with provider message below and information to call clinic back or respond via mychart.    Writer also sent patient mychart message with provider message below.    3 attempts to reach patient, no response/callback.Signing encounter.       Torres Montelongo RN  [facility]

## 2022-09-28 ENCOUNTER — TELEPHONE (OUTPATIENT)
Dept: GASTROENTEROLOGY | Facility: CLINIC | Age: 77
End: 2022-09-28

## 2022-09-28 NOTE — TELEPHONE ENCOUNTER
Health Call Center    Phone Message    May a detailed message be left on voicemail: yes     Reason for Call: Appointment Intake    Referring Provider Name: Shelli Cash MD  Diagnosis and/or Symptoms: Diarrhea, unspecified type [R19.7]    Per triage notes, patient is to be seen as a GI Urgent, but is currently scheduled on 05/30/2023 with Dr. Peter Barton as patient declined a virtual visit and is only wanting to be seen in [place]. Current appointment is outside requested time frame. Please review for further follow up per scheduling guidelines. Thanks!     Action Taken: Message routed to:  [facility] GI    Travel Screening: Not Applicable

## 2022-09-29 ENCOUNTER — TELEPHONE (OUTPATIENT)
Dept: FAMILY MEDICINE | Facility: CLINIC | Age: 77
End: 2022-09-29

## 2022-09-29 NOTE — TELEPHONE ENCOUNTER
"Pt called requesting advice on lab results. States she received a phone call from nurses that she needs to stop taking vitamin D3 and Vitamin B-12.     Reports she was taking vitamin D3 dose- 50,000 international unit(s) every Monday. When  Triage asked for how long \"not very long\".     She is questioning why she has to stop supplements above. Writer informed her vitamin B-12 level 04/14 was over 2,000. No record of recent vitamin D level. Writer advised she needs to check levels before taking supplements high dose vitamin-D may not be appropriate. Pt also wants to recheck vitamin D3. Asked if she can get an appt with doctor Shailesh to discuss lab results for vitamin D- & B12. Future VV appt was scheduled.   "

## 2022-10-04 ENCOUNTER — TELEPHONE (OUTPATIENT)
Dept: FAMILY MEDICINE | Facility: CLINIC | Age: 77
End: 2022-10-04

## 2022-10-04 ENCOUNTER — PRE VISIT (OUTPATIENT)
Dept: GASTROENTEROLOGY | Facility: CLINIC | Age: 77
End: 2022-10-04

## 2022-10-04 ENCOUNTER — VIRTUAL VISIT (OUTPATIENT)
Dept: FAMILY MEDICINE | Facility: CLINIC | Age: 77
End: 2022-10-04

## 2022-10-04 ENCOUNTER — OFFICE VISIT (OUTPATIENT)
Dept: GASTROENTEROLOGY | Facility: CLINIC | Age: 77
End: 2022-10-04
Attending: INTERNAL MEDICINE
Payer: COMMERCIAL

## 2022-10-04 VITALS
BODY MASS INDEX: 17.94 KG/M2 | OXYGEN SATURATION: 99 % | HEART RATE: 94 BPM | WEIGHT: 97.5 LBS | HEIGHT: 62 IN | DIASTOLIC BLOOD PRESSURE: 61 MMHG | SYSTOLIC BLOOD PRESSURE: 112 MMHG

## 2022-10-04 DIAGNOSIS — N94.89 LABIAL SWELLING: Primary | ICD-10-CM

## 2022-10-04 DIAGNOSIS — A04.8 OTHER SPECIFIED BACTERIAL INTESTINAL INFECTIONS: ICD-10-CM

## 2022-10-04 DIAGNOSIS — R19.7 DIARRHEA, UNSPECIFIED TYPE: ICD-10-CM

## 2022-10-04 LAB
ABO/RH(D): NORMAL
ANTIBODY SCREEN: NEGATIVE
SPECIMEN EXPIRATION DATE: NORMAL

## 2022-10-04 PROCEDURE — 99204 OFFICE O/P NEW MOD 45 MIN: CPT | Mod: GC | Performed by: STUDENT IN AN ORGANIZED HEALTH CARE EDUCATION/TRAINING PROGRAM

## 2022-10-04 PROCEDURE — 99207 PR NO CHARGE LOS: CPT | Performed by: FAMILY MEDICINE

## 2022-10-04 NOTE — PROGRESS NOTES
Bonny is a 76 year old who is being evaluated via a billable telephone visit.      What phone number would you like to be contacted at? [phone]  How would you like to obtain your AVS?     Assessment & Plan     Labial swelling  Discussed that she would need to be seen , not appropriate for a telephone visit , she does have an appointment with her PCP tomorrow and will try to switch to in person appointment       Raquel Hernandez MD  [facility]    Subjective   Bonny is a 76 year old, presenting for the following health issues:  No chief complaint on file.      HPI     Vaginal Symptoms its been a long time   No dysuria hurts when sitting   Onset/Duration: 4 days   Description:  Vaginal Discharge: none   Itching (Pruritis): No  Burning sensation:  YES  Odor: No  Accompanying Signs & Symptoms:  Urinary symptoms: No  Abdominal pain: No  Fever: No  History:   Sexually active: Not Currently  New Partner: No  Possibility of Pregnancy:  No  Recent antibiotic use: YES  Previous vaginitis issues: N/A  Precipitating or alleviating factors: None  Therapies tried and outcome: Calmoseptine, Not effective          Review of Systems   Constitutional, HEENT, cardiovascular, pulmonary, GI, , musculoskeletal, neuro, skin, endocrine and psych systems are negative, except as otherwise noted.      Objective           Vitals:  No vitals were obtained today due to virtual visit.    Physical Exam   healthy, alert and no distress  PSYCH: Alert and oriented times 3; coherent speech, normal   rate and volume, able to articulate logical thoughts, able   to abstract reason, no tangential thoughts, no hallucinations   or delusions  Her affect is normal  RESP: No cough, no audible wheezing, able to talk in full sentences  Remainder of exam unable to be completed due to telephone visits    No results found for this or any previous visit (from the past 24 hour(s)).            Phone call duration: 6 minutes

## 2022-10-04 NOTE — TELEPHONE ENCOUNTER
Spoke with patient and changed appointment for 2:40 Pm tomorrow in person.    Tran Burnette ,CHRIS

## 2022-10-04 NOTE — TELEPHONE ENCOUNTER
Can you change the visit to in person?      ----- Message from Raquel Hernandez MD sent at 10/4/2022 12:16 PM CDT -----  Regarding: Appoitment for one of your patients  Hi Dr Marques,   I had one of your patient on my schedule as a telephone visit today - she has some edema and redness on her labia and thinks this is an infection, I told her this needs to be an OV and not a telephone visit , so someone can look at the area , in order to make a proper diagnosis .  She has scheduled a telephone visit with you for tomorrow , are you able to make this an OV or fit her in a same day slot if you have ?  Thank you     Raquel Hernandez M.D.    Family physician   [facility]   [phone]

## 2022-10-04 NOTE — LETTER
10/4/2022         RE: Bonny Raymundo  [address]  [facility] [address]        Dear Colleague,    Thank you for referring your patient, Bonny Raymundo, to the [facility] GASTROENTEROLOG[facility] [place]. Please see a copy of my visit note below.    [facility] Gastroenterology Clinic:   New Consult for Chronic diarrhea      Date of visit: 10/4/2022 --    Referring provider: Dr. Cash (PCP)    CC: 76 year old female referred by PCP for evaluation of chronic diarrhea.    Chronic, watery, loose diarrhea ongoing for past 1 year, well controlled with half a tab of Imodium daily. No blood noted. Associated weight loss. Prior colonoscopy in 2018 showed diverticulosis. Follows with Dr. Spann in Oncology clinic for aplastic anemia.    Clinical picture concerning given associated weight loss. Differential includes microscopic colitis (also with PPI use) vs celiac disease (prior GHAZALA as well) vs malignancy vs CMV colitis (prior immunosuppression use) vs infectious vs neutropenic colitis. Will need stool studies and EGD/Colonoscopy for further evaluation.     ASSESSMENT AND PLAN:    #  Chronic diarrhea  #  Weight loss  -- Ordered stool studies (Fecal calprotectin, culture, ova and parasites)  -- Will need EGD/Colonoscopy for further evaluation. Given neutropenia, will reach out to Dr. Spann to discuss risks vs benefits.   -- Other study to consider would be CT abdomen/pelvis w IV contrast for further evaluation         Return to Clinic: 6 months    Kane Castellano MD      Patient discussed and seen with Gastroenterology staff Dr. Dawson, who is in agreement with the above.     ====================================================================================================================================  HPI: Ms. Raymundo is a 76 years old F with PMH of aplastic anemia, CKD stage IV, prior GIB, iron deficiency anemia previously on IV iron, DVT/PE on Eliquis who presents to GI clinic for  evaluation of chronic diarrhea.     Patient started having watery, loose diarrhea around 1 year ago, describes loose in consistency, takes 1/2 tab of Imodium in the morning which works well for her most days, decreases her bowel from 3-4 times daily to twice daily. Denies blood, denies constipation. Has associated weight loss of 20-25 lbs the past year. Reports mild abdominal discomfort. Previously tried Metamucil but stopped taking because she thought it's counteracting the effect of Imodium.     Had prior colonoscopy in 2018 remarkable for diverticulosis. Follows with Dr. Spann in Oncology clinic.         Targeted GI review of systems complete and is otherwise negative.     Past Medical History:   Diagnosis Date     Allergic rhinitis due to other allergen      Aplastic anemia (H) 1/9/2017     Closed anterior dislocation of humerus      DVT of lower extremity (deep venous thrombosis) (H) 10-12    Left after prolonged sitting-plane     DVT, recurrent, lower extremity, acute (H) 2014     Headache(784.0)      Osteoporosis, unspecified      Pulmonary emboli (H) 10-12     Sensorineural hearing loss, unspecified      Sprain of ankle, unspecified site     L       Past Surgical History:   Procedure Laterality Date     ARTHRODESIS FOOT  7-18-11    Hallux valgus R-1st MP joint     BLEPHAROPLASTY BILATERAL  2012, 2014     BONE MARROW BIOPSY, BONE SPECIMEN, NEEDLE/TROCAR N/A 9/26/2016    Procedure: BIOPSY BONE MARROW;  Surgeon: Mu Morgan MD;  Location:  GI     BONE MARROW BIOPSY, BONE SPECIMEN, NEEDLE/TROCAR N/A 11/21/2016    Procedure: BIOPSY BONE MARROW;  Surgeon: Mu Morgan MD;  Location:  GI     C DEXA INTERPRETATION, AXIAL  8-20-03     CATARACT IOL, RT/LT Right 1988     CATARACT IOL, RT/LT Left 1985     COLONOSCOPY N/A 5/24/2018    Procedure: COLONOSCOPY;  colonoscopy;  Surgeon: Meliton Castrejon MD;  Location:  GI     EXCHANGE INTRAOCULAR LENS IMPLANT Right 2/2/2015     "Procedure: EXCHANGE INTRAOCULAR LENS IMPLANT;  Surgeon: Garrett Dawson MD;  Location:  EC     PICC INSERTION Left 2017    5fr DL BioFlo PICC, 42cm (2cm external) in the L basilic vein w/ tip in the  SVC RA junction.     VITRECTOMY PARSPLANA WITH 23 GAUGE SYSTEM Right 2015    Procedure: VITRECTOMY PARSPLANA WITH 23 GAUGE SYSTEM;  Surgeon: Racheal Loyd MD;  Location:  EC     ZZC NONSPECIFIC PROCEDURE           ZZC NONSPECIFIC PROCEDURE      hysterectomy/BSO     ZZC NONSPECIFIC PROCEDURE      myomectomy (fibroids)     ZZHC COLONOSCOPY THRU STOMA, DIAGNOSTIC      normal- minimal diverticulosis       Family History   Problem Relation Age of Onset     Musculoskeletal Disorder Mother         MS     Heart Disease Father      Heart Disease Brother         afib       Social History     Tobacco Use     Smoking status: Former Smoker     Quit date: 1973     Years since quittin.4     Smokeless tobacco: Never Used   Substance Use Topics     Alcohol use: No     Alcohol/week: 0.0 standard drinks     Comment: occasionally       Physical exam:     Vitals:/61   Pulse 94   Ht 1.575 m (5' 2\")   Wt 44.2 kg (97 lb 8 oz)   SpO2 99%   BMI 17.83 kg/m     BMI: Body mass index is 17.83 kg/m .      GEN: NAD, A&Ox3, appropriate  HEENT: EOMI, PERRLA, MMM, hearing grossly intact  Neck: full ROM  Cardiopulmonary: non labored, comfortable on RA, nondiaphoretic, no LE edema  Abdominal: soft, nontender, nondistended, no HSM, no rebound, no guarding, normoactive BS  Skin: no jaundice, no gross lesions on visible skin  Neuro: A&Ox3, grossly intact motor/sensation, gait intact  MSK: no gross deformity, moves arms/legs equally  Psych: normal affect, congruent with mood      Labs:       Relevant imaging:     U/S abdomen 2021    GALLBLADDER: The gallbladder is normal. No gallstones, wall  thickening, or pericholecystic fluid. Negative sonographic Oneil's  sign.     BILE DUCTS: Mild biliary " dilatation, possibly related to age. The  common duct measures 8 mm.     LIVER: Unremarkable echogenicity without focal lesions.     RIGHT KIDNEY: No hydronephrosis.     PANCREAS: The visualized portions of the pancreas are normal.     No ascites.        Endoscopy:    Colonoscopy 05/2018                                                                                      Impression:               - The examined portion of the ileum was normal.                             - Diverticulosis in the sigmoid colon and in the                             descending colon.                             - No specimens collected.       Pathology:       Relevant surgical reports:           Attestation signed by Sean Dawson MD at 10/13/2022  9:44 AM:  ATTENDING ATTESTATION:     DATE SEEN: 9/4/22    Patient was discussed, seen, and examined by me, Sean Dawson. The plan of care and pertinent data/imaging were reviewed with Dr. Castellano. I agree with the assessment and plan as delineated above with the following additions:     Chronic diarrhea well controlled with imodium. However, she does have weight loss. Discussed risks/benefits of endoscopy with patient and with her hematologist (Dr. Spann) given neutropenia. Dr. Spann feels EGD/colonoscopy are appropriate and safe. When we have date of procedure we will let the hematology team know so they can give her some Neupogen leading up to it.      All questions answered to patient's stated satisfaction    Thank you for this consultation.  It was a pleasure to participate in the care of this patient; please contact us with any further questions.  I spent a total of 45 minutes during the day of encounter performed chart review, meeting with patient, patient counseling, care coordination, and documentation.    Please contact me with any further questions.          Again, thank you for allowing me to participate in the care of your patient.      Sincerely,    Kane  MD Gray

## 2022-10-04 NOTE — TELEPHONE ENCOUNTER
REFERRAL INFORMATION:    Referring Provider: Dr. Shelli Cash     Referring Clinic:  [facility]     Reason for Visit/Diagnosis: Diarrhea      FUTURE VISIT INFORMATION:    Appointment Date: 10/4/2022    Appointment Time: 1 PM      NOTES STATUS DETAILS   OFFICE NOTE from Referring Provider Internal 8/31/2022 Office visit with Dr. Cash      OFFICE NOTE from Other Specialist Internal 6/2/2022 Office visit with Dr. Rafita Rogel ([facility])    1/21/2022 Office visit with Dr.Kamil Urban ([facility])    5/6/19 Office visit with Shivani Phelps PA-C ([facility])           HOSPITAL DISCHARGE SUMMARY/  ED VISITS N/A    OPERATIVE REPORT N/A    MEDICATION LIST Internal         ENDOSCOPY  N/A    COLONOSCOPY Internal/ Received  5/24/18 5/14/03 ([facility])   ERCP N/A    EUS N/A    STOOL TESTING Internal  1/26/2022   PERTINENT LABS Internal    PATHOLOGY REPORTS (RELATED) N/A    IMAGING (CT, MRI, EGD, MRCP, Small Bowel Follow Through/SBT, MR/CT Enterography) N/A

## 2022-10-04 NOTE — TELEPHONE ENCOUNTER
YOEL to pt return our call.  would like to see this pt in person. Patient have a telephone visit scheduled for tomorrow 10/05/2022 at 5:00 but  can see her in person at 2:40 PM.    Tran Burnette CMA

## 2022-10-04 NOTE — PROGRESS NOTES
[facility] Gastroenterology Clinic:   New Consult for Chronic diarrhea      Date of visit: 10/4/2022 --    Referring provider: Dr. Cash (PCP)    CC: 76 year old female referred by PCP for evaluation of chronic diarrhea.    Chronic, watery, loose diarrhea ongoing for past 1 year, well controlled with half a tab of Imodium daily. No blood noted. Associated weight loss. Prior colonoscopy in 2018 showed diverticulosis. Follows with Dr. Spann in Oncology clinic for aplastic anemia.    Clinical picture concerning given associated weight loss. Differential includes microscopic colitis (also with PPI use) vs celiac disease (prior GHAZALA as well) vs malignancy vs CMV colitis (prior immunosuppression use) vs infectious vs neutropenic colitis. Will need stool studies and EGD/Colonoscopy for further evaluation.     ASSESSMENT AND PLAN:    #  Chronic diarrhea  #  Weight loss  -- Ordered stool studies (Fecal calprotectin, culture, ova and parasites)  -- Will need EGD/Colonoscopy for further evaluation. Given neutropenia, will reach out to Dr. Spann to discuss risks vs benefits.   -- Other study to consider would be CT abdomen/pelvis w IV contrast for further evaluation         Return to Clinic: 6 months    Kane Castellano MD      Patient discussed and seen with Gastroenterology staff Dr. Dawson, who is in agreement with the above.     ====================================================================================================================================  HPI: Ms. Raymundo is a 76 years old F with PMH of aplastic anemia, CKD stage IV, prior GIB, iron deficiency anemia previously on IV iron, DVT/PE on Eliquis who presents to GI clinic for evaluation of chronic diarrhea.     Patient started having watery, loose diarrhea around 1 year ago, describes loose in consistency, takes 1/2 tab of Imodium in the morning which works well for her most days, decreases her bowel from 3-4 times daily to twice daily. Denies  blood, denies constipation. Has associated weight loss of 20-25 lbs the past year. Reports mild abdominal discomfort. Previously tried Metamucil but stopped taking because she thought it's counteracting the effect of Imodium.     Had prior colonoscopy in 2018 remarkable for diverticulosis. Follows with Dr. Spann in Oncology clinic.         Targeted GI review of systems complete and is otherwise negative.     Past Medical History:   Diagnosis Date     Allergic rhinitis due to other allergen      Aplastic anemia (H) 1/9/2017     Closed anterior dislocation of humerus      DVT of lower extremity (deep venous thrombosis) (H) 10-12    Left after prolonged sitting-plane     DVT, recurrent, lower extremity, acute (H) 2014     Headache(784.0)      Osteoporosis, unspecified      Pulmonary emboli (H) 10-12     Sensorineural hearing loss, unspecified      Sprain of ankle, unspecified site     L       Past Surgical History:   Procedure Laterality Date     ARTHRODESIS FOOT  7-18-11    Hallux valgus R-1st MP joint     BLEPHAROPLASTY BILATERAL  2012, 2014     BONE MARROW BIOPSY, BONE SPECIMEN, NEEDLE/TROCAR N/A 9/26/2016    Procedure: BIOPSY BONE MARROW;  Surgeon: Mu Morgan MD;  Location: [facility]     BONE MARROW BIOPSY, BONE SPECIMEN, NEEDLE/TROCAR N/A 11/21/2016    Procedure: BIOPSY BONE MARROW;  Surgeon: Mu Morgan MD;  Location:  GI     C DEXA INTERPRETATION, AXIAL  8-20-03     CATARACT IOL, RT/LT Right 1988     CATARACT IOL, RT/LT Left 1985     COLONOSCOPY N/A 5/24/2018    Procedure: COLONOSCOPY;  colonoscopy;  Surgeon: Meliton Castrejon MD;  Location:  GI     EXCHANGE INTRAOCULAR LENS IMPLANT Right 2/2/2015    Procedure: EXCHANGE INTRAOCULAR LENS IMPLANT;  Surgeon: Garrett Dawson MD;  Location:  EC     PICC INSERTION Left 1/9/2017    5fr DL BioFlo PICC, 42cm (2cm external) in the L basilic vein w/ tip in the  SVC RA junction.     VITRECTOMY PARSPLANA WITH 23 GAUGE SYSTEM Right  "2015    Procedure: VITRECTOMY PARSPLANA WITH 23 GAUGE SYSTEM;  Surgeon: Racheal Loyd MD;  Location:  EC     [facility] NONSPECIFIC PROCEDURE           [facility] NONSPECIFIC PROCEDURE      hysterectomy/BSO     [facility] NONSPECIFIC PROCEDURE      myomectomy (fibroids)     ZZ COLONOSCOPY THRU STOMA, DIAGNOSTIC      normal- minimal diverticulosis       Family History   Problem Relation Age of Onset     Musculoskeletal Disorder Mother         MS     Heart Disease Father      Heart Disease Brother         afib       Social History     Tobacco Use     Smoking status: Former Smoker     Quit date: 1973     Years since quittin.4     Smokeless tobacco: Never Used   Substance Use Topics     Alcohol use: No     Alcohol/week: 0.0 standard drinks     Comment: occasionally       Physical exam:     Vitals:/61   Pulse 94   Ht 1.575 m (5' 2\")   Wt 44.2 kg (97 lb 8 oz)   SpO2 99%   BMI 17.83 kg/m     BMI: Body mass index is 17.83 kg/m .      GEN: NAD, A&Ox3, appropriate  HEENT: EOMI, PERRLA, MMM, hearing grossly intact  Neck: full ROM  Cardiopulmonary: non labored, comfortable on RA, nondiaphoretic, no LE edema  Abdominal: soft, nontender, nondistended, no HSM, no rebound, no guarding, normoactive BS  Skin: no jaundice, no gross lesions on visible skin  Neuro: A&Ox3, grossly intact motor/sensation, gait intact  MSK: no gross deformity, moves arms/legs equally  Psych: normal affect, congruent with mood      Labs:       Relevant imaging:     U/S abdomen 2021    GALLBLADDER: The gallbladder is normal. No gallstones, wall  thickening, or pericholecystic fluid. Negative sonographic Oneil's  sign.     BILE DUCTS: Mild biliary dilatation, possibly related to age. The  common duct measures 8 mm.     LIVER: Unremarkable echogenicity without focal lesions.     RIGHT KIDNEY: No hydronephrosis.     PANCREAS: The visualized portions of the pancreas are normal.     No ascites.        Endoscopy:    Colonoscopy " 05/2018                                                                                      Impression:               - The examined portion of the ileum was normal.                             - Diverticulosis in the sigmoid colon and in the                             descending colon.                             - No specimens collected.       Pathology:       Relevant surgical reports:

## 2022-10-04 NOTE — PATIENT INSTRUCTIONS
Thank you for visiting us today!      We will check some stool studies for your diarrhea. We think doing an endoscopy to evaluate your diarrhea is important but we will check with your Oncology provider first. We might consider an imaging scan of your belly soon.     Please get your stool studies done! Let us know if you have any questions.

## 2022-10-04 NOTE — NURSING NOTE
"Chief Complaint   Patient presents with     New Patient       Vitals:    10/04/22 1314   BP: 112/61   Pulse: 94   SpO2: 99%   Weight: 44.2 kg (97 lb 8 oz)   Height: 1.575 m (5' 2\")       Body mass index is 17.83 kg/m .    Eboni Ascencio CMA    "

## 2022-10-05 ENCOUNTER — INFUSION THERAPY VISIT (OUTPATIENT)
Dept: INFUSION THERAPY | Facility: CLINIC | Age: 77
End: 2022-10-05
Attending: INTERNAL MEDICINE
Payer: COMMERCIAL

## 2022-10-05 ENCOUNTER — LAB (OUTPATIENT)
Dept: LAB | Facility: CLINIC | Age: 77
End: 2022-10-05
Payer: COMMERCIAL

## 2022-10-05 ENCOUNTER — ONCOLOGY VISIT (OUTPATIENT)
Dept: ONCOLOGY | Facility: CLINIC | Age: 77
End: 2022-10-05
Attending: INTERNAL MEDICINE
Payer: COMMERCIAL

## 2022-10-05 ENCOUNTER — PATIENT OUTREACH (OUTPATIENT)
Dept: ONCOLOGY | Facility: CLINIC | Age: 77
End: 2022-10-05

## 2022-10-05 ENCOUNTER — TELEPHONE (OUTPATIENT)
Dept: GASTROENTEROLOGY | Facility: CLINIC | Age: 77
End: 2022-10-05

## 2022-10-05 ENCOUNTER — OFFICE VISIT (OUTPATIENT)
Dept: FAMILY MEDICINE | Facility: CLINIC | Age: 77
End: 2022-10-05

## 2022-10-05 VITALS
TEMPERATURE: 97.5 F | DIASTOLIC BLOOD PRESSURE: 67 MMHG | WEIGHT: 98.1 LBS | RESPIRATION RATE: 16 BRPM | BODY MASS INDEX: 17.94 KG/M2 | OXYGEN SATURATION: 96 % | SYSTOLIC BLOOD PRESSURE: 111 MMHG | HEART RATE: 99 BPM

## 2022-10-05 VITALS
DIASTOLIC BLOOD PRESSURE: 69 MMHG | TEMPERATURE: 97.4 F | OXYGEN SATURATION: 100 % | HEIGHT: 62 IN | WEIGHT: 97.8 LBS | HEART RATE: 104 BPM | SYSTOLIC BLOOD PRESSURE: 107 MMHG | RESPIRATION RATE: 20 BRPM | BODY MASS INDEX: 18 KG/M2

## 2022-10-05 VITALS
WEIGHT: 98.1 LBS | OXYGEN SATURATION: 96 % | HEART RATE: 99 BPM | TEMPERATURE: 97.5 F | RESPIRATION RATE: 16 BRPM | DIASTOLIC BLOOD PRESSURE: 67 MMHG | SYSTOLIC BLOOD PRESSURE: 111 MMHG | BODY MASS INDEX: 17.94 KG/M2

## 2022-10-05 DIAGNOSIS — N18.4 ANEMIA OF CHRONIC RENAL FAILURE, STAGE 4 (SEVERE) (H): ICD-10-CM

## 2022-10-05 DIAGNOSIS — N18.4 CKD (CHRONIC KIDNEY DISEASE) STAGE 4, GFR 15-29 ML/MIN (H): Primary | ICD-10-CM

## 2022-10-05 DIAGNOSIS — D63.1 ANEMIA OF CHRONIC RENAL FAILURE, STAGE 4 (SEVERE) (H): ICD-10-CM

## 2022-10-05 DIAGNOSIS — D61.9 APLASTIC ANEMIA (H): Primary | ICD-10-CM

## 2022-10-05 DIAGNOSIS — R26.89 LOSS OF BALANCE: ICD-10-CM

## 2022-10-05 DIAGNOSIS — N18.4 CKD (CHRONIC KIDNEY DISEASE) STAGE 4, GFR 15-29 ML/MIN (H): ICD-10-CM

## 2022-10-05 DIAGNOSIS — E55.9 VITAMIN D DEFICIENCY: ICD-10-CM

## 2022-10-05 DIAGNOSIS — L03.90 CELLULITIS, UNSPECIFIED CELLULITIS SITE: ICD-10-CM

## 2022-10-05 DIAGNOSIS — D61.3 IDIOPATHIC APLASTIC ANEMIA (H): ICD-10-CM

## 2022-10-05 DIAGNOSIS — D61.9 APLASTIC ANEMIA (H): ICD-10-CM

## 2022-10-05 DIAGNOSIS — B37.2 INTERTRIGINOUS CANDIDIASIS: Primary | ICD-10-CM

## 2022-10-05 DIAGNOSIS — R39.89 URINARY PROBLEM: ICD-10-CM

## 2022-10-05 DIAGNOSIS — Z86.718 HISTORY OF DEEP VENOUS THROMBOSIS: ICD-10-CM

## 2022-10-05 DIAGNOSIS — R63.4 WEIGHT LOSS, UNINTENTIONAL: ICD-10-CM

## 2022-10-05 LAB
ANION GAP SERPL CALCULATED.3IONS-SCNC: 12 MMOL/L (ref 7–15)
BASOPHILS # BLD MANUAL: 0 10E3/UL (ref 0–0.2)
BASOPHILS NFR BLD MANUAL: 0 %
BLD PROD TYP BPU: NORMAL
BLOOD COMPONENT TYPE: NORMAL
BUN SERPL-MCNC: 22.9 MG/DL (ref 8–23)
CALCIUM SERPL-MCNC: 8.8 MG/DL (ref 8.8–10.2)
CHLORIDE SERPL-SCNC: 103 MMOL/L (ref 98–107)
CODING SYSTEM: NORMAL
CREAT SERPL-MCNC: 1.19 MG/DL (ref 0.51–0.95)
CROSSMATCH: NORMAL
DACRYOCYTES BLD QL SMEAR: SLIGHT
DEPRECATED HCO3 PLAS-SCNC: 24 MMOL/L (ref 22–29)
EOSINOPHIL # BLD MANUAL: 0 10E3/UL (ref 0–0.7)
EOSINOPHIL NFR BLD MANUAL: 1 %
ERYTHROCYTE [DISTWIDTH] IN BLOOD BY AUTOMATED COUNT: 22.5 % (ref 10–15)
GFR SERPL CREATININE-BSD FRML MDRD: 47 ML/MIN/1.73M2
GLUCOSE SERPL-MCNC: 114 MG/DL (ref 70–99)
HCT VFR BLD AUTO: 22.5 % (ref 35–47)
HGB BLD-MCNC: 6.6 G/DL (ref 11.7–15.7)
HOLD SPECIMEN: NORMAL
ISSUE DATE AND TIME: NORMAL
LYMPHOCYTES # BLD MANUAL: 0.7 10E3/UL (ref 0.8–5.3)
LYMPHOCYTES NFR BLD MANUAL: 32 %
MCH RBC QN AUTO: 32 PG (ref 26.5–33)
MCHC RBC AUTO-ENTMCNC: 29.3 G/DL (ref 31.5–36.5)
MCV RBC AUTO: 109 FL (ref 78–100)
MONOCYTES # BLD MANUAL: 0.4 10E3/UL (ref 0–1.3)
MONOCYTES NFR BLD MANUAL: 21 %
NEUTROPHILS # BLD MANUAL: 1 10E3/UL (ref 1.6–8.3)
NEUTROPHILS NFR BLD MANUAL: 46 %
NRBC # BLD AUTO: 0.1 10E3/UL
NRBC BLD MANUAL-RTO: 5 %
PLAT MORPH BLD: ABNORMAL
PLATELET # BLD AUTO: 95 10E3/UL (ref 150–450)
POTASSIUM SERPL-SCNC: 3.6 MMOL/L (ref 3.4–5.3)
RBC # BLD AUTO: 2.06 10E6/UL (ref 3.8–5.2)
RBC MORPH BLD: ABNORMAL
SODIUM SERPL-SCNC: 139 MMOL/L (ref 136–145)
UNIT ABO/RH: NORMAL
UNIT NUMBER: NORMAL
UNIT STATUS: NORMAL
UNIT TYPE ISBT: 6200
VIT B12 SERPL-MCNC: 1515 PG/ML (ref 232–1245)
WBC # BLD AUTO: 2.1 10E3/UL (ref 4–11)

## 2022-10-05 PROCEDURE — 82607 VITAMIN B-12: CPT | Performed by: INTERNAL MEDICINE

## 2022-10-05 PROCEDURE — 99214 OFFICE O/P EST MOD 30 MIN: CPT | Performed by: INTERNAL MEDICINE

## 2022-10-05 PROCEDURE — 36415 COLL VENOUS BLD VENIPUNCTURE: CPT | Performed by: INTERNAL MEDICINE

## 2022-10-05 PROCEDURE — 85027 COMPLETE CBC AUTOMATED: CPT | Performed by: INTERNAL MEDICINE

## 2022-10-05 PROCEDURE — 250N000011 HC RX IP 250 OP 636: Mod: EC | Performed by: INTERNAL MEDICINE

## 2022-10-05 PROCEDURE — 86901 BLOOD TYPING SEROLOGIC RH(D): CPT | Performed by: INTERNAL MEDICINE

## 2022-10-05 PROCEDURE — 99215 OFFICE O/P EST HI 40 MIN: CPT | Performed by: INTERNAL MEDICINE

## 2022-10-05 PROCEDURE — 85007 BL SMEAR W/DIFF WBC COUNT: CPT | Performed by: INTERNAL MEDICINE

## 2022-10-05 PROCEDURE — 96372 THER/PROPH/DIAG INJ SC/IM: CPT | Performed by: INTERNAL MEDICINE

## 2022-10-05 PROCEDURE — 82310 ASSAY OF CALCIUM: CPT

## 2022-10-05 PROCEDURE — 82374 ASSAY BLOOD CARBON DIOXIDE: CPT

## 2022-10-05 PROCEDURE — 86850 RBC ANTIBODY SCREEN: CPT | Performed by: INTERNAL MEDICINE

## 2022-10-05 PROCEDURE — G0463 HOSPITAL OUTPT CLINIC VISIT: HCPCS | Mod: 25

## 2022-10-05 RX ORDER — ALBUTEROL SULFATE 0.83 MG/ML
2.5 SOLUTION RESPIRATORY (INHALATION)
Status: CANCELLED | OUTPATIENT
Start: 2022-10-05

## 2022-10-05 RX ORDER — EPINEPHRINE 1 MG/ML
0.3 INJECTION, SOLUTION INTRAMUSCULAR; SUBCUTANEOUS EVERY 5 MIN PRN
Status: CANCELLED | OUTPATIENT
Start: 2022-10-05

## 2022-10-05 RX ORDER — MEPERIDINE HYDROCHLORIDE 25 MG/ML
25 INJECTION INTRAMUSCULAR; INTRAVENOUS; SUBCUTANEOUS EVERY 30 MIN PRN
Status: CANCELLED | OUTPATIENT
Start: 2022-10-05

## 2022-10-05 RX ORDER — HEPARIN SODIUM,PORCINE 10 UNIT/ML
5 VIAL (ML) INTRAVENOUS
Status: CANCELLED | OUTPATIENT
Start: 2022-10-05

## 2022-10-05 RX ORDER — NYSTATIN 100000 U/G
OINTMENT TOPICAL 2 TIMES DAILY
Qty: 30 G | Refills: 0 | Status: SHIPPED | OUTPATIENT
Start: 2022-10-05 | End: 2022-10-12

## 2022-10-05 RX ORDER — HEPARIN SODIUM (PORCINE) LOCK FLUSH IV SOLN 100 UNIT/ML 100 UNIT/ML
5 SOLUTION INTRAVENOUS
Status: CANCELLED | OUTPATIENT
Start: 2022-10-05

## 2022-10-05 RX ORDER — DIPHENHYDRAMINE HYDROCHLORIDE 50 MG/ML
50 INJECTION INTRAMUSCULAR; INTRAVENOUS
Status: CANCELLED
Start: 2022-10-05

## 2022-10-05 RX ORDER — CEPHALEXIN 500 MG/1
500 CAPSULE ORAL 2 TIMES DAILY
Qty: 14 CAPSULE | Refills: 0 | Status: SHIPPED | OUTPATIENT
Start: 2022-10-05 | End: 2022-10-12

## 2022-10-05 RX ORDER — METHYLPREDNISOLONE SODIUM SUCCINATE 125 MG/2ML
125 INJECTION, POWDER, LYOPHILIZED, FOR SOLUTION INTRAMUSCULAR; INTRAVENOUS
Status: CANCELLED
Start: 2022-10-05

## 2022-10-05 RX ORDER — ZINC OXIDE
OINTMENT (GRAM) TOPICAL PRN
Qty: 56 G | Refills: 0 | Status: SHIPPED | OUTPATIENT
Start: 2022-10-05 | End: 2023-01-01

## 2022-10-05 RX ORDER — ALBUTEROL SULFATE 90 UG/1
1-2 AEROSOL, METERED RESPIRATORY (INHALATION)
Status: CANCELLED
Start: 2022-10-05

## 2022-10-05 RX ADMIN — DARBEPOETIN ALFA 100 MCG: 100 INJECTION, SOLUTION INTRAVENOUS; SUBCUTANEOUS at 10:53

## 2022-10-05 ASSESSMENT — PAIN SCALES - GENERAL
PAINLEVEL: EXTREME PAIN (9)
PAINLEVEL: NO PAIN (0)
PAINLEVEL: NO PAIN (0)

## 2022-10-05 NOTE — PATIENT INSTRUCTIONS
Apply nystatin cream to the lesion and top it with zinc oxide cream   Take antibiotics for 7 days  Take a daily probiotic while you are taking this antibiotic    Keep the area clean and dry.

## 2022-10-05 NOTE — PROGRESS NOTES
Patient presents to the [facility] for Aranesp injection.  Order written by Dr. Spann was completed today. Name and  verified with patient. See MAR for medication details. Medication was divided into 1 syringes by pharmacy and given in the following sites abdominal tissues, RLQ. Patient tolerated injection well and was discharged to home. Patient to return back in 21 days.    LEONEL Delcid LPN    Administrations This Visit     darbepoetin karolina-polysorbate (ARANESP) injection 100 mcg     Admin Date  10/05/2022 Action  Given Dose  100 mcg Route  Subcutaneous Administered By  Desiree Delcid LPN

## 2022-10-05 NOTE — PROGRESS NOTES
Assessment & Plan     Intertriginous candidiasis  Discussed with patient to: Apply nystatin cream to the lesion and top it with zinc oxide cream   Keep the area clean and dry.  - zinc oxide (DESITIN) 40 % external ointment; Apply topically as needed for irritation  - nystatin (MYCOSTATIN) 855644 UNIT/GM external ointment; Apply topically 2 times daily    Cellulitis, unspecified cellulitis site  Take antibiotics for 7 days  Take a daily probiotic while you are taking this antibiotic  - cephALEXin (KEFLEX) 500 MG capsule; Take 1 capsule (500 mg) by mouth 2 times daily for 7 days    Urinary problem  Patient was not able to give a urine sample.   - UA Macro with Reflex to Micro and Culture - lab collect    Vitamin D deficiency  - Vitamin D Deficiency; Future    Loss of balance  - Physical Therapy Referral; Future     See Patient Instructions    Return in about 6 months (around 4/5/2023) for Follow up, Routine preventive, with me.    RUSSELL GOODMAN MD  [facility] VITO Draper is a 76 year old, presenting for the following health issues:  Vaginal Problem      ANSELMO Draper is  A very pleasant 76 year old lady who presented to the clinic for the following concerns.    She has right labia intertriginous lesion, which started 4 days ago. She started using a barrier cream without much relief.   She also started having some dysuria this morning.     On exam, under the right labia majora there is redness and skin break which looks like intertriginous fungal infection however there is surrounding redness which raises suspicion of cellulitis.     She takes vitamin D3 50,000 units every other week. Wants to know if this is the right dose.   She brought supplement bottles containing vitamin K-2 and magnesium malate.    She wants to get physical therapy for balance problems.       Review of Systems       Objective    /69 (BP Location: Left arm, Patient Position: Sitting, Cuff Size: Adult Small)    "Pulse 104   Temp 97.4  F (36.3  C) (Oral)   Resp 20   Ht 1.575 m (5' 2\")   Wt 44.4 kg (97 lb 12.8 oz)   SpO2 100%   BMI 17.89 kg/m    Body mass index is 17.89 kg/m .  Physical Exam               "

## 2022-10-05 NOTE — PROGRESS NOTES
[facility]: Cancer Care                                                                                          Spoke with pt and gave her the appt information for her blood transfusion tomorrow at [facility] at 9am.  Pt has another commitment, but was going to try and reschedule her other appt.  Pt given infusion number to [facility] if she can't make transfusion tomorrow.  Writer faxed consent that was signed in clinic today to [facility] and [employer] [facility] [employer] is in process.  Writer advised pt to reach back out if unable to make transfusion tomorrow or Friday.       Signature:  Sue Marquez RN

## 2022-10-05 NOTE — TELEPHONE ENCOUNTER
YESICA and sent my chart to schedule a follow up appointment with Dr. Castellano in about 3 months (around 1/4/23)

## 2022-10-05 NOTE — NURSING NOTE
Chief Complaint   Patient presents with     Blood Draw     Pt had labs drawn via  by RN. VS taken.     Labs drawn with  by rn.  Pt tolerated well.  VS taken.  Pt checked in for next appt.    Abundio Rivers RN

## 2022-10-05 NOTE — PROGRESS NOTES
"Hematology clinic visit  In person visit     Problem list:  - Aplastic anemia-diagnosed 9/26/2016, repeat bone marrow biopsy 12/22/16- bone marrow <5% cellular with no dysplasia.  Normal cytogenetics. PNH negative. 1/9/ 2017-treated with ATG, methylprednisolone, cyclosporine, and eltrombopag.  Cyclosporine discontinued May 2021, eltrombopag discontinued June 2021 due to abnormal liver function test.    Partial remission with the immunotherapy, which is gradually weaned.  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  - Shingles right face  2022     Interval history:  is here for follow-up of aplastic anemia.  She received Aranesp 100 mcg subcutaneous on 8/22/2022 and 9/12/2022.  She received 1 unit packed RBCs on 8/25/2022.    She says her energy is okay.  She complains of poor appetite.  She has tried eating smaller meals more frequently, but then she says she is just not hungry at mealtimes.  She complains of some itching.  She feels a little bit \"wobbly\".  She is going to ask her primary care physician regarding physical therapy.  She has some chronic left leg edema related to her history of DVT, unchanged.  She continues on apixaban,No epistaxis, melena, hematochezia, easy bruising. She quit taking B12, so wonders if her level is ok.       PHYSICAL EXAMINATION:  /67 (BP Location: Right arm, Patient Position: Sitting, Cuff Size: Adult Small)   Pulse 99   Temp 97.5  F (36.4  C) (Oral)   Resp 16   Wt 44.5 kg (98 lb 1.6 oz)   SpO2 96%   BMI 17.94 kg/m      General " appearance:  Patient is 76 year old woman in no acute distress.  Frail-appearing, but able to climb up onto exam table.  HEENT:  No  icterus, or mucositis.   Lungs:  Clear to auscultation bilaterally.   Heart:  Regular rate and rhythm; no S3 S4 or murmer.     Abdomen:  Positive bowel sounds, soft and nontender, nondistended.  No hepatomegaly. No splenomegaly appreciated.    Extremities:  Trace left ankle edema.     Skin:  No rash, no petechiae or ecchymoses.      Labs:   Latest Reference Range & Units 08/22/22 13:36 09/12/22 14:43 10/05/22 10:17   WBC 4.0 - 11.0 10e3/uL 1.6 (L) 3.1 (L) 2.1 (L) (P)   Hemoglobin 11.7 - 15.7 g/dL 6.8 (LL) 7.5 (L) 6.6 (LL) (P)   Hematocrit 35.0 - 47.0 % 23.2 (L) 25.1 (L) 22.5 (L) (P)   Platelet Count 150 - 450 10e3/uL 78 (L) 80 (L) 95 (L) (P)   RBC Count 3.80 - 5.20 10e6/uL 2.13 (L) 2.35 (L) 2.06 (L) (P)   MCV 78 - 100 fL 109 (H) 107 (H) 109 (H) (P)   MCH 26.5 - 33.0 pg 31.9 31.9 32.0 (P)   MCHC 31.5 - 36.5 g/dL 29.3 (L) 29.9 (L) 29.3 (L) (P)   RDW 10.0 - 15.0 % 20.3 (H) 18.9 (H) 22.5 (H) (P)   % Neutrophils % 25 44 46   % Lymphocytes % 62 34 32   % Monocytes % 12 19 21   % Eosinophils % 0 0 1   % Basophils % 1 0 0   Absolute Basophils 0.0 - 0.2 10e3/uL  0.0    Absolute Basophils 0.0 - 0.2 10e3/uL 0.0  0.0   NRBC/W <=0 % 1 (H)  5 (H)   Absolute Neutrophil 1.6 - 8.3 10e3/uL 0.4 (LL)  1.0 (L)   Absolute Lymphocytes 0.8 - 5.3 10e3/uL 1.0  0.7 (L)   Absolute Monocytes 0.0 - 1.3 10e3/uL 0.2  0.4   Absolute Eosinophils 0.0 - 0.7 10e3/uL 0.0  0.0   Absolute Eosinophils 0.0 - 0.7 10e3/uL  0.0    Absolute Immature Granulocytes <=0.4 10e3/uL  0.1    Absolute Lymphocytes 0.8 - 5.3 10e3/uL  1.0    Absolute Monocytes 0.0 - 1.3 10e3/uL  0.6    (L): Data is abnormally low  (H): Data is abnormally high  (LL): Data is critically low  (P): Preliminary    Assessment and Recommendation:  #Aplastic anemia-  severe anemia with mild neutropenia and thrombocytopenia  - erythropoietin level elevated, but < 500,  iron studies , copper-normal, PNH-negative, SPEP-no monoclonal protein, kappa/lambda-both elevated with normal ratio, probably related to renal failure, TSH-normal.  Recheck B12 today that is pending.  I discussed with her that I do not know if increasing Aranesp will help prevent the need for transfusions.  She might eventually need routine Red cell transfusions.  If she has not responded to Aranesp, then it is also worth considering doing a repeat bone marrow biopsy.  She is very frail, I am not sure that if she has an overt MDS that she would be able to tolerate chemotherapy.  If she has worsening aplastic anemia, again I am not sure she would tolerate immunosuppression medications.   - Transfuse 1 unit packed red blood cells sometime within the next week  - Increase Aranesp to 300 mcg subcu every 2 weeks        #History of recurrent unprovoked DVT and PE-continue  apixaban to 2.5 mg twice daily-refilled 90 day supply iwith refills.      #Unexplained weight loss-stable, no splenomegaly to cause early satiety     #Chronic kidney disease-  her primary care physician placed the referral, has not seen nephrology yet.     #Unsteadiness-she should talk to her PCP about physical therapy.    RTC Yeni Medina in 2  Months, Zana in 4 months.      Time: I spent a total of 50 minutes on the day of the visit. Please see the note for further information on patient assessment and treatment.  Patient is new to me and I spent extensive time reviewing her chart and arranging various needed follow-up.     Minerva Spann MD  Hematology

## 2022-10-05 NOTE — NURSING NOTE
"Oncology Rooming Note    October 5, 2022 11:15 AM   Bonny Raymundo is a 76 year old female who presents for:    Chief Complaint   Patient presents with     Blood Draw     Pt had labs drawn via  by RN. VS taken.     Oncology Clinic Visit     Aplastic Anemia     Initial Vitals: /67 (BP Location: Right arm, Patient Position: Sitting, Cuff Size: Adult Small)   Pulse 99   Temp 97.5  F (36.4  C) (Oral)   Resp 16   Wt 44.5 kg (98 lb 1.6 oz)   SpO2 96%   BMI 17.94 kg/m   Estimated body mass index is 17.94 kg/m  as calculated from the following:    Height as of 10/4/22: 1.575 m (5' 2\").    Weight as of this encounter: 44.5 kg (98 lb 1.6 oz). Body surface area is 1.4 meters squared.  No Pain (0) Comment: Data Unavailable   No LMP recorded. Patient has had a hysterectomy.  Allergies reviewed: Yes  Medications reviewed: Yes    Medications: Medication refills not needed today.  Pharmacy name entered into [facility]:    [facility] [place], [place] - [address], [address]  [facility] [place] - [address]  [id number] [place], [place] - [address]    Clinical concerns:  Pt has multiple concerns.      Kayla Burger CMA              "

## 2022-10-05 NOTE — LETTER
"    10/5/2022         RE: Bonny Raymundo  [address]  [place] [address]        Dear Colleague,    Thank you for referring your patient, Bonny Raymundo, to the [facility]. Please see a copy of my visit note below.    Hematology clinic visit  In person visit     Problem list:  - Aplastic anemia-diagnosed 9/26/2016, repeat bone marrow biopsy 12/22/16- bone marrow <5% cellular with no dysplasia.  Normal cytogenetics. PNH negative. 1/9/ 2017-treated with ATG, methylprednisolone, cyclosporine, and eltrombopag.  Cyclosporine discontinued May 2021, eltrombopag discontinued June 2021 due to abnormal liver function test.    Partial remission with the immunotherapy, which is gradually weaned.  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  - Shingles right face  2022     Interval history:  is here for follow-up of aplastic anemia.  She received Aranesp 100 mcg subcutaneous on 8/22/2022 and 9/12/2022.  She received 1 unit packed RBCs on 8/25/2022.    She says her energy is okay.  She complains of poor appetite.  She has tried eating smaller meals more frequently, but then she says she is just not hungry at mealtimes.  She complains of some itching.  She feels a little bit \"wobbly\".  She is going to ask her primary care physician regarding physical therapy.  She has some chronic left leg edema related to her history of DVT, unchanged.  She continues on apixaban,No epistaxis, melena, hematochezia, easy bruising. She quit taking B12, so wonders " if her level is ok.       PHYSICAL EXAMINATION:  /67 (BP Location: Right arm, Patient Position: Sitting, Cuff Size: Adult Small)   Pulse 99   Temp 97.5  F (36.4  C) (Oral)   Resp 16   Wt 44.5 kg (98 lb 1.6 oz)   SpO2 96%   BMI 17.94 kg/m      General appearance:  Patient is 76 year old woman in no acute distress.  Frail-appearing, but able to climb up onto exam table.  HEENT:  No  icterus, or mucositis.   Lungs:  Clear to auscultation bilaterally.   Heart:  Regular rate and rhythm; no S3 S4 or murmer.     Abdomen:  Positive bowel sounds, soft and nontender, nondistended.  No hepatomegaly. No splenomegaly appreciated.    Extremities:  Trace left ankle edema.     Skin:  No rash, no petechiae or ecchymoses.      Labs:   Latest Reference Range & Units 08/22/22 13:36 09/12/22 14:43 10/05/22 10:17   WBC 4.0 - 11.0 10e3/uL 1.6 (L) 3.1 (L) 2.1 (L) (P)   Hemoglobin 11.7 - 15.7 g/dL 6.8 (LL) 7.5 (L) 6.6 (LL) (P)   Hematocrit 35.0 - 47.0 % 23.2 (L) 25.1 (L) 22.5 (L) (P)   Platelet Count 150 - 450 10e3/uL 78 (L) 80 (L) 95 (L) (P)   RBC Count 3.80 - 5.20 10e6/uL 2.13 (L) 2.35 (L) 2.06 (L) (P)   MCV 78 - 100 fL 109 (H) 107 (H) 109 (H) (P)   MCH 26.5 - 33.0 pg 31.9 31.9 32.0 (P)   MCHC 31.5 - 36.5 g/dL 29.3 (L) 29.9 (L) 29.3 (L) (P)   RDW 10.0 - 15.0 % 20.3 (H) 18.9 (H) 22.5 (H) (P)   % Neutrophils % 25 44 46   % Lymphocytes % 62 34 32   % Monocytes % 12 19 21   % Eosinophils % 0 0 1   % Basophils % 1 0 0   Absolute Basophils 0.0 - 0.2 10e3/uL  0.0    Absolute Basophils 0.0 - 0.2 10e3/uL 0.0  0.0   NRBC/W <=0 % 1 (H)  5 (H)   Absolute Neutrophil 1.6 - 8.3 10e3/uL 0.4 (LL)  1.0 (L)   Absolute Lymphocytes 0.8 - 5.3 10e3/uL 1.0  0.7 (L)   Absolute Monocytes 0.0 - 1.3 10e3/uL 0.2  0.4   Absolute Eosinophils 0.0 - 0.7 10e3/uL 0.0  0.0   Absolute Eosinophils 0.0 - 0.7 10e3/uL  0.0    Absolute Immature Granulocytes <=0.4 10e3/uL  0.1    Absolute Lymphocytes 0.8 - 5.3 10e3/uL  1.0    Absolute Monocytes 0.0 - 1.3 10e3/uL  0.6     (L): Data is abnormally low  (H): Data is abnormally high  (LL): Data is critically low  (P): Preliminary    Assessment and Recommendation:  #Aplastic anemia-  severe anemia with mild neutropenia and thrombocytopenia  - erythropoietin level elevated, but < 500, iron studies , copper-normal, PNH-negative, SPEP-no monoclonal protein, kappa/lambda-both elevated with normal ratio, probably related to renal failure, TSH-normal.  Recheck B12 today that is pending.  I discussed with her that I do not know if increasing Aranesp will help prevent the need for transfusions.  She might eventually need routine Red cell transfusions.  If she has not responded to Aranesp, then it is also worth considering doing a repeat bone marrow biopsy.  She is very frail, I am not sure that if she has an overt MDS that she would be able to tolerate chemotherapy.  If she has worsening aplastic anemia, again I am not sure she would tolerate immunosuppression medications.   - Transfuse 1 unit packed red blood cells sometime within the next week  - Increase Aranesp to 300 mcg subcu every 2 weeks        #History of recurrent unprovoked DVT and PE-continue  apixaban to 2.5 mg twice daily-refilled 90 day supply iwith refills.      #Unexplained weight loss-stable, no splenomegaly to cause early satiety     #Chronic kidney disease-  her primary care physician placed the referral, has not seen nephrology yet.     #Unsteadiness-she should talk to her PCP about physical therapy.    RTC Yeni Medina in 2  Months, Zana in 4 months.      Time: I spent a total of 50 minutes on the day of the visit. Please see the note for further information on patient assessment and treatment.  Patient is new to me and I spent extensive time reviewing her chart and arranging various needed follow-up.         Again, thank you for allowing me to participate in the care of your patient.      Sincerely,    Minerva Spann MD

## 2022-10-07 ENCOUNTER — INFUSION THERAPY VISIT (OUTPATIENT)
Dept: INFUSION THERAPY | Facility: CLINIC | Age: 77
End: 2022-10-07
Attending: INTERNAL MEDICINE
Payer: COMMERCIAL

## 2022-10-07 VITALS
TEMPERATURE: 98 F | SYSTOLIC BLOOD PRESSURE: 125 MMHG | RESPIRATION RATE: 18 BRPM | OXYGEN SATURATION: 100 % | DIASTOLIC BLOOD PRESSURE: 78 MMHG | HEART RATE: 71 BPM

## 2022-10-07 DIAGNOSIS — D61.818 PANCYTOPENIA (H): ICD-10-CM

## 2022-10-07 DIAGNOSIS — D61.9 APLASTIC ANEMIA (H): Primary | ICD-10-CM

## 2022-10-07 PROCEDURE — 86923 COMPATIBILITY TEST ELECTRIC: CPT | Performed by: INTERNAL MEDICINE

## 2022-10-07 PROCEDURE — 36430 TRANSFUSION BLD/BLD COMPNT: CPT

## 2022-10-07 PROCEDURE — P9016 RBC LEUKOCYTES REDUCED: HCPCS | Performed by: INTERNAL MEDICINE

## 2022-10-07 NOTE — PROGRESS NOTES
Medical Assistant Note:  Bonny Raymundo presents today for lab draw.    Patient seen by provider today: No.   present during visit today: Not Applicable.    Concerns: No Concerns.    Procedure:  Lab draw site: LAC, Needle type: BF, Gauge: 21. Koki and coban applied    Post Assessment:  Labs drawn without difficulty: Yes.    Discharge Plan:  Departure Mode: Ambulatory.    Face to Face Time: 5.    Ramya Zabala CMA             07-Oct-2022

## 2022-10-07 NOTE — PROGRESS NOTES
Infusion Nursing Note:  Bonny Raymundo presents today for unit PRBC.    Patient seen by provider today: No.  Seen by Dr. Zana quintero 10/5/22   present during visit today: Not Applicable.    Note: Patient reports to having fatigue and sob on exertion with her anemia.  She also reports to a history of diarrhea.  Has a standing order to collect stool to r/o c-diff.  Per patient she has the collection kit at home and is not interested in trying to leave a stool sample while in the clinic today.    Intravenous Access:  Peripheral IV placed.    Treatment Conditions:  Lab Results   Component Value Date    HGB 6.6 (LL) 10/05/2022    WBC 2.1 (L) 10/05/2022    ANEU 1.0 (L) 10/05/2022    ANEUTAUTO 1.3 (L) 09/12/2022    PLT 95 (L) 10/05/2022      Lab Results   Component Value Date     10/05/2022    POTASSIUM 3.6 10/05/2022    MAG 1.8 06/02/2022    CR 1.19 (H) 10/05/2022    LEO 8.8 10/05/2022    BILITOTAL 0.4 08/22/2022    ALBUMIN 2.3 (L) 08/22/2022    ALT 11 08/22/2022    AST 14 08/22/2022     Results reviewed, labs MET treatment parameters, ok to proceed with treatment.  Blood transfusion consent signed 10/5/22.    Post Infusion Assessment:  Patient tolerated infusion without incident.  Blood return noted pre and post infusion.  Site patent and intact, free from redness, edema or discomfort.  No evidence of extravasations.  Access discontinued per protocol.     Discharge Plan:   Patient declined prescription refills.  Discharge instructions reviewed with: Patient.  Patient and/or family verbalized understanding of discharge instructions and all questions answered.  AVS to patient via [employer].  Patient currently has no new appointments scheduled.  Message sent to scheduling to help schedule her next lab appointment/possible Aranesp injection.  Patient discharged in stable condition accompanied by: self.        Sue Conner RN

## 2022-10-11 ENCOUNTER — NURSE TRIAGE (OUTPATIENT)
Dept: FAMILY MEDICINE | Facility: CLINIC | Age: 77
End: 2022-10-11

## 2022-10-11 ENCOUNTER — TELEPHONE (OUTPATIENT)
Dept: FAMILY MEDICINE | Facility: CLINIC | Age: 77
End: 2022-10-11

## 2022-10-11 NOTE — TELEPHONE ENCOUNTER
Nurse Triage SBAR    Is this a 2nd Level Triage? NO    Situation: Pt is experiencing generalized itching all over her body. She states this is not a rash and not hives but she does have some pinpoint red spots on her arms, back, and legs. She denies scratch marks and bleeding. No fever and denies any other symptoms. She says it does not interfere with her normal activities and started months ago. However, concerned this is worsening.     Background: She has not come into contact with anything she can think of, not exposed to anything.     Assessment: see below    Protocol Recommended Disposition:   See in Office Within 3 Days    Recommendation: protocol states to be seen within 3 days. At first pt did not want to be seen. Advised pt provider will likely want to evaluate and advise this issue with visit. Nurse offered to look any multiple provider schedules. She states she only wants to see Dr. Cash. Is provider OK with a virtual visit for this tomorrow or next day or can a same day/virtual slot be changed to in person to see her? She can only do telephone if virtual. Please review/advise.     Routed to provider    Does the patient meet one of the following criteria for ADS visit consideration? 16+ years old, with an [facility] PCP     TIP  Providers, please consider if this condition is appropriate for management at one of our Acute and Diagnostic Services sites.     If patient is a good candidate, please use dotphrase <dot>triageresponse and select Refer to ADS to document.    OK to leave detailed vm.     Reason for Disposition    Widespread itching and cause unknown and present > 48 hours    Additional Information    Negative: Life-threatening reaction (anaphylaxis) in the past to similar substance (e.g., food, insect bite/sting, chemical, etc.) and < 2 hours since exposure    Negative: Difficulty breathing or wheezing    Negative: Difficulty swallowing or slurred speech and sudden onset    Negative: Sounds like a  "life-threatening emergency to the triager    Negative: Insect bites suspected    Negative: Hives suspected (urticaria, itchy pink bumps with pale centers that come and go over minutes to hours)    Negative: Widespread rash also present    Negative: Itching in just one area or spot    Negative: Patient sounds very sick or weak to the triager    Negative: MODERATE-SEVERE widespread itching (i.e., interferes with sleep, normal activities or school) and not improved after 24 hours of itching Care Advice    Negative: MODERATE-SEVERE widespread itching (i.e., interferes with sleep, normal activities or school) AND pregnant    Negative: Patient wants to be seen    Negative: MILD widespread itching AND pregnant    Negative: Hand or foot itching AND pregnant    Negative: Taking prescription medication that could cause itching (e.g., codeine/morphine/other opiates, aspirin)    Answer Assessment - Initial Assessment Questions  1. DESCRIPTION: \"Describe the itching you are having.\"        Generalized itching, all over the body.     2. SEVERITY: \"How bad is it?\"     - MILD - doesn't interfere with normal activities    - MODERATE-SEVERE: interferes with work, school, sleep, or other activities         Mild, still able to do normal activities.     3. SCRATCHING: \"Are there any scratch marks? Bleeding?\"        She does notice pinpoint dots, red. They seem to be arms, back, legs.     4. ONSET: \"When did this begin?\"         This has been going on for months now. She is concerned that this is worsening.     5. CAUSE: \"What do you think is causing the itching?\" (ask about swimming pools, pollen, animals, soaps, etc.)        Unsure, not exposed to anything.     6. OTHER SYMPTOMS: \"Do you have any other symptoms?\"         No. No fever.     7. PREGNANCY: \"Is there any chance you are pregnant?\" \"When was your last menstrual period?\"        NA    Protocols used: ITCHING - WIDESPREAD-A-OH    SEE IN OFFICE WITHIN 3 DAYS:   * You need to be " examined.   * Let me give you an appointment.      CALL BACK IF:  * Rash occurs   * Itching becomes worse or lasts over 48 hours   * You become worse        Patient/Caregiver understands and will follow care advice? Other, see documentation     Nga Gutierrez [facility]

## 2022-10-11 NOTE — TELEPHONE ENCOUNTER
Reason for Call:  Medication or medication refill:    Do you use a [facility]?  Name of the pharmacy and phone number for the current request:  [id number] - [place], [place] - [address]    Name of the medication requested: cephALEXin (KEFLEX) 500 MG capsule    Other request: Patient states the med helped a bit, but not completely, feels she may need more    Can we leave a detailed message on this number? YES    Phone number patient can be reached at: Home number on file [phone] (home)    Best Time: anytime    Call taken on 10/11/2022 at 7:25 AM by Jo Amezquita

## 2022-10-11 NOTE — TELEPHONE ENCOUNTER
Patient called back, OV scheduled for 10/12/22. Virtual slot used for an in person visit - if this is not okay please send back to patient care team.     Appointments in Next Year    Oct 12, 2022  4:30 PM  (Arrive by 4:10 PM)  Provider Visit with Shelli Cash MD  [facility] ([facility] ) [phone]

## 2022-10-11 NOTE — TELEPHONE ENCOUNTER
Patient Contact    Attempt # 2    Was call answered?  No.  Left message on voicemail with information to call triage back.    On callback - please schedule patient with Dr. Cash per below     Cr Thompson RN  [facility]

## 2022-10-11 NOTE — TELEPHONE ENCOUNTER
Talked to the patient about itching, she is already taking benadryl and will take an extra dose.   She was seen in clinic for vulvar infection 1 week ago and it has not completely resolved. I would like to see her in clinic 10/12 or 10/13 use any available slot, for in person visit.

## 2022-10-12 ENCOUNTER — APPOINTMENT (OUTPATIENT)
Dept: LAB | Facility: CLINIC | Age: 77
End: 2022-10-12
Payer: COMMERCIAL

## 2022-10-12 ENCOUNTER — OFFICE VISIT (OUTPATIENT)
Dept: FAMILY MEDICINE | Facility: CLINIC | Age: 77
End: 2022-10-12
Payer: COMMERCIAL

## 2022-10-12 VITALS
WEIGHT: 98.6 LBS | HEART RATE: 77 BPM | SYSTOLIC BLOOD PRESSURE: 141 MMHG | TEMPERATURE: 97 F | HEIGHT: 62 IN | DIASTOLIC BLOOD PRESSURE: 86 MMHG | OXYGEN SATURATION: 100 % | BODY MASS INDEX: 18.14 KG/M2 | RESPIRATION RATE: 16 BRPM

## 2022-10-12 DIAGNOSIS — L29.9 ITCHING: ICD-10-CM

## 2022-10-12 DIAGNOSIS — B02.29 POST HERPETIC NEURALGIA: Primary | ICD-10-CM

## 2022-10-12 DIAGNOSIS — Z23 NEED FOR PROPHYLACTIC VACCINATION AND INOCULATION AGAINST INFLUENZA: ICD-10-CM

## 2022-10-12 DIAGNOSIS — Z23 HIGH PRIORITY FOR 2019-NCOV VACCINE: ICD-10-CM

## 2022-10-12 DIAGNOSIS — B37.2 INTERTRIGINOUS CANDIDIASIS: ICD-10-CM

## 2022-10-12 PROCEDURE — 99213 OFFICE O/P EST LOW 20 MIN: CPT | Mod: 25 | Performed by: INTERNAL MEDICINE

## 2022-10-12 PROCEDURE — 91312 COVID-19,PF,PFIZER BOOSTER BIVALENT: CPT | Performed by: INTERNAL MEDICINE

## 2022-10-12 PROCEDURE — 0124A COVID-19,PF,PFIZER BOOSTER BIVALENT: CPT | Performed by: INTERNAL MEDICINE

## 2022-10-12 PROCEDURE — G0008 ADMIN INFLUENZA VIRUS VAC: HCPCS | Mod: 59 | Performed by: INTERNAL MEDICINE

## 2022-10-12 PROCEDURE — 90662 IIV NO PRSV INCREASED AG IM: CPT | Performed by: INTERNAL MEDICINE

## 2022-10-12 RX ORDER — NYSTATIN 100000 U/G
OINTMENT TOPICAL 2 TIMES DAILY
Qty: 30 G | Refills: 0 | Status: SHIPPED | OUTPATIENT
Start: 2022-10-12 | End: 2022-01-01

## 2022-10-12 ASSESSMENT — PAIN SCALES - GENERAL: PAINLEVEL: MILD PAIN (2)

## 2022-10-12 NOTE — PROGRESS NOTES
"  Assessment & Plan     Post herpetic neuralgia  Pharmacy was not able to dispense this topical agent, changed to oral gabapentin for PRN use.   - ketamine 5%-gabapentin 8%-lidocaine 2.5% in PLO cream; Apply 4 clicks (1 g) topically daily as needed (for pain/itching on forehead)    Intertrigo labialis  Redness/cellulitis has resolved completely.   There is still a wound under right labia majora.   Patient has not been applying zinc oxide on top of nystatin. She was applying another topical agent which contains zinc oxide 20% and menthol. Asked her to apply nystatin and 40% zinc oxide cream for barrier. Follow up in 1 week via phone call or visit. If lesion does not improve will refer her to Gyn for further evaluation.   - nystatin (MYCOSTATIN) 728388 UNIT/GM external ointment; Apply topically 2 times daily    Intertriginous candidiasis  - nystatin (MYCOSTATIN) [id number] UNIT/GM external ointment; Apply topically 2 times daily    Itching  generalized itching.   Already using benadryl twice daily as needed.   - Adult Dermatology Referral; Future    High priority for 2019-nCoV vaccine  - COVID-19,PF,PFIZER BOOSTER BIVALENT 12+Yrs    Need for prophylactic vaccination and inoculation against influenza  - INFLUENZA, QUAD, HIGH DOSE, PF, 65YR + (FLUZONE HD)     See Patient Instructions    Return in about 6 months (around 4/12/2023) for Follow up, Routine preventive, with me.    RUSSELL GOODMAN MD  [facility] [place]    Edilson Draper is a 76 year old, presenting for the following health issues:  Vaginal Problem    HPI   Bonny is here for follow up on right labia majora lesion which was treated with antibiotics.   She was also given nystatin + zinc oxide barrier cream prescription.    Review of Systems         Objective    BP (!) 141/86 (BP Location: Left arm, Patient Position: Sitting, Cuff Size: Adult Small)   Pulse 77   Temp 97  F (36.1  C) (Oral)   Resp 16   Ht 1.575 m (5' 2.01\")   Wt 44.7 kg (98 lb 9.6 " oz)   SpO2 100%   BMI 18.03 kg/m    Body mass index is 18.03 kg/m .  Physical Exam

## 2022-10-13 ENCOUNTER — TELEPHONE (OUTPATIENT)
Dept: FAMILY MEDICINE | Facility: CLINIC | Age: 77
End: 2022-10-13

## 2022-10-13 ENCOUNTER — TELEPHONE (OUTPATIENT)
Dept: GASTROENTEROLOGY | Facility: CLINIC | Age: 77
End: 2022-10-13

## 2022-10-13 DIAGNOSIS — B02.29 POST HERPETIC NEURALGIA: Primary | ICD-10-CM

## 2022-10-13 PROBLEM — Z23 NEED FOR PROPHYLACTIC VACCINATION AND INOCULATION AGAINST INFLUENZA: Status: ACTIVE | Noted: 2022-10-13

## 2022-10-13 PROBLEM — Z23 HIGH PRIORITY FOR 2019-NCOV VACCINE: Status: ACTIVE | Noted: 2022-10-13

## 2022-10-13 RX ORDER — GABAPENTIN 100 MG/1
100 CAPSULE ORAL DAILY PRN
Qty: 30 CAPSULE | Refills: 0 | Status: SHIPPED | OUTPATIENT
Start: 2022-10-13 | End: 2022-01-01

## 2022-10-13 NOTE — TELEPHONE ENCOUNTER
----- Message from Sean Dawson MD sent at 10/13/2022  9:41 AM CDT -----  Regarding: FW: Concern for neutropenia prior endoscopy  Hi Sourav,    Can you help get this EGD/colon scheduled. Not urgent.    Once we have a date let me know so I can send to his hematology team to arrange for Neupogen leading up to it.    Thanks!  J    ----- Message -----  From: Minerva Spann MD  Sent: 10/9/2022   4:05 PM CDT  To: Kane Castellano MD, #  Subject: RE: Concern for neutropenia prior endoscopy      It is ok to go forward with endoscopies. Her ANC is unlikely to get better on its own, because she has aplastic anemia. If I know the date of procedure, I can arrange for her to receive Neupogen a few days before.  Minerva Spann  ----- Message -----  From: Kane Castellano MD  Sent: 10/4/2022   2:05 PM CDT  To: Minerva Spann MD, #  Subject: Concern for neutropenia prior endoscopy          [facility] Dr. Spann,    My name is Maury, I am one of the Gastroenterology fellows working with Dr. Dawson in the GI clinic. We saw Ms. Raymundo to evaluate her chronic diarrhea. We were concerned by her weight loss ongoing for the past year and we would ideally like to perform EGD/Colonoscopy for further evaluation (rule out malignancy, celiac disease, microscopic colitis etc.). The problem is her neutrophil count is quite low. What are your thoughts about proceeding with the endoscopies? Do you think we should proceed or hold off? Should we wait until her neutrophil count improves first? Please let us know about your thoughts. Thank you in advance!    Best,  Maury Castellano  PGY-4 GI fellow  #[id number]

## 2022-10-13 NOTE — TELEPHONE ENCOUNTER
Disp Refills Start End KAMRAN   ketamine 5%-gabapentin 8%-lidocaine 2.5% in PLO cream 30 g 0 10/12/2022  --   Sig - Route: Apply 4 clicks (1 g) topically daily as needed (for pain/itching on forehead) - Topical     [facility] pharmacy states they cannot compound this med, RX needs to be sent elsewhere

## 2022-10-13 NOTE — CONFIDENTIAL NOTE
Sent [employer] message with instructions to schedule EGD/colonoscopy, per Dr. Dawson/Gray instructions on appointment on 10/4. Sent inbasket message to endoscopy scheduling as well.

## 2022-10-13 NOTE — TELEPHONE ENCOUNTER
Left Message for patient to call us back to schedule    Irais Godinez RN  P Endoscopy Scheduling Pool  See Dr. Dawson's message below.     Thank you,   Sourav           Previous Messages     ----- Message -----   From: Sean Dawson MD   Sent: 10/13/2022   9:45 AM CDT   To: Irais Godienz RN   Subject: FW: Concern for neutropenia prior endoscopy       Hi [facility],     Can you help get this EGD/colon scheduled. Not urgent.     Once we have a date let me know so I can send to his hematology team to arrange for Neupogen leading up to it.     Thanks!   J     ----- Message -----   From: Minerva Spann MD   Sent: 10/9/2022   4:05 PM CDT   To: Kane Castellano MD, *   Subject: RE: Concern for neutropenia prior endoscopy       It is ok to go forward with endoscopies. Her ANC is unlikely to get better on its own, because she has aplastic anemia. If I know the date of procedure, I can arrange for her to receive Neupogen a few days before.   Minerva Spann   ----- Message -----   From: Kane Castellano MD   Sent: 10/4/2022   2:05 PM CDT   To: Minerva Spann MD, *   Subject: Concern for neutropenia prior endoscopy           [facility] Dr. Spann,     My name is Maury, I am one of the Gastroenterology fellows working with Dr. Dawson in the GI clinic. We saw Ms. Raymundo to evaluate her chronic diarrhea. We were concerned by her weight loss ongoing for the past year and we would ideally like to perform EGD/Colonoscopy for further evaluation (rule out malignancy, celiac disease, microscopic colitis etc.). The problem is her neutrophil count is quite low. What are your thoughts about proceeding with the endoscopies? Do you think we should proceed or hold off? Should we wait until her neutrophil count improves first? Please let us know about your thoughts. Thank you in advance!     Best,   Maury Castellano   PGY-4 GI fellow   #[phone]

## 2022-10-14 ENCOUNTER — TELEPHONE (OUTPATIENT)
Dept: GASTROENTEROLOGY | Facility: CLINIC | Age: 77
End: 2022-10-14

## 2022-10-14 NOTE — TELEPHONE ENCOUNTER
Call to [facility] pharmacy at [phone]. Detailed message left informing pharmacy of Dr. Cash's below response.       Neda Hermosillo RN BSN MSN  [facility]

## 2022-10-14 NOTE — CONFIDENTIAL NOTE
Attempted to contact the patient to assist with scheduling EGD/colonoscopy. No answer, left voicemail. Awaiting callback.

## 2022-10-14 NOTE — TELEPHONE ENCOUNTER
----- Message from Sean Dawson MD sent at 10/13/2022  9:41 AM CDT -----  Regarding: FW: Concern for neutropenia prior endoscopy  Hi Sourav,    Can you help get this EGD/colon scheduled. Not urgent.    Once we have a date let me know so I can send to his hematology team to arrange for Neupogen leading up to it.    Thanks!  J    ----- Message -----  From: Minerva Spann MD  Sent: 10/9/2022   4:05 PM CDT  To: Kane Castellano MD, #  Subject: RE: Concern for neutropenia prior endoscopy      It is ok to go forward with endoscopies. Her ANC is unlikely to get better on its own, because she has aplastic anemia. If I know the date of procedure, I can arrange for her to receive Neupogen a few days before.  Minerva Spann  ----- Message -----  From: Kane Castellano MD  Sent: 10/4/2022   2:05 PM CDT  To: Minerva Spann MD, #  Subject: Concern for neutropenia prior endoscopy          [facility] Dr. Spann,    My name is Maury, I am one of the Gastroenterology fellows working with Dr. Dawson in the GI clinic. We saw Ms. Raymundo to evaluate her chronic diarrhea. We were concerned by her weight loss ongoing for the past year and we would ideally like to perform EGD/Colonoscopy for further evaluation (rule out malignancy, celiac disease, microscopic colitis etc.). The problem is her neutrophil count is quite low. What are your thoughts about proceeding with the endoscopies? Do you think we should proceed or hold off? Should we wait until her neutrophil count improves first? Please let us know about your thoughts. Thank you in advance!    Best,  Maury Castellano  PGY-4 GI fellow  #[phone]

## 2022-10-19 ENCOUNTER — PATIENT OUTREACH (OUTPATIENT)
Dept: GASTROENTEROLOGY | Facility: CLINIC | Age: 77
End: 2022-10-19

## 2022-10-19 ENCOUNTER — OFFICE VISIT (OUTPATIENT)
Dept: NEUROLOGY | Facility: CLINIC | Age: 77
End: 2022-10-19
Attending: OPHTHALMOLOGY
Payer: COMMERCIAL

## 2022-10-19 VITALS — SYSTOLIC BLOOD PRESSURE: 101 MMHG | HEART RATE: 115 BPM | OXYGEN SATURATION: 99 % | DIASTOLIC BLOOD PRESSURE: 71 MMHG

## 2022-10-19 DIAGNOSIS — R79.9 ABNORMAL FINDING OF BLOOD CHEMISTRY, UNSPECIFIED: ICD-10-CM

## 2022-10-19 DIAGNOSIS — I63.9 OCCIPITAL STROKE (H): Primary | ICD-10-CM

## 2022-10-19 DIAGNOSIS — H53.462 HOMONYMOUS BILATERAL FIELD DEFECTS IN VISUAL FIELD, LEFT: ICD-10-CM

## 2022-10-19 PROCEDURE — 99205 OFFICE O/P NEW HI 60 MIN: CPT | Performed by: PSYCHIATRY & NEUROLOGY

## 2022-10-19 NOTE — LETTER
10/19/2022         RE: Bonny Raymundo  [address]  [place] [address]        Dear Colleague,    Thank you for referring your patient, Bonny Raymundo, to the [facility] NEUROLOGY CLINICS [facility]. Please see a copy of my visit note below.    INITIAL NEUROLOGY CONSULTATION    DATE OF VISIT: 10/19/2022  CLINIC LOCATION: [facility]  MRN: [id number]  PATIENT NAME: Bonny Raymundo  YOB: 1945    REASON FOR VISIT:   Chief Complaint   Patient presents with     Referral     Homonymous bilateral field defects in visual field  Occipital stroke      HISTORY OF PRESENT ILLNESS:                                                    Ms. 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    Per patient's report, in June 2022 the patient woke up with right visual field defect affected her ability to walk.  She was seen in ophthalmology clinic and ordered to have MRI of the brain together with head and neck MRA along with neurology referral.    Brain MRI with and without contrast from 8/7/2022 demonstrated of zone of late subacute to early chronic infarct in the left posterior cerebral artery territory affecting the medial left occipital lobe and splenium of the corpus callosum, correlating with the provided history.  In addition, mild generalized atrophy and small vessel ischemic changes affecting left corona radiata, right thalamus, bilateral cerebellum and white matter were seen.  Head MRA was negative.  Neck MRI did not demonstrate any evidence for dissection or hemodynamically significant narrowing.  All images were personally reviewed and independently interpreted.    Most recent echocardiogram was done in December 2020 and was negative for intracardiac  thrombus.  Most recent laboratory results from October 2020 to include creatinine of 1.19, glucose of 114, vitamin B12 1515, WBC of 2.1, hemoglobin of 6.6, hematocrit of 22.5, MCV of 109, and platelet count of 95.  Her LDL was last checked in April 2018 (119), hemoglobin A1C was not checked in the past.    At the present time, the patient reports improvement of the right visual cut.  She is on 2.5 mg of Eliquis twice daily.  This dose was reduced in July 2022 by her hematologist oncologist.  She is not on statin. Complaints of itching on the right side of the scalp following shingles. She denies any additional focal neurological symptoms.  Reports several episodes of diarrhea that started this morning.  Has a visit with a provider to discuss.    No additional useful information is available in Care Everywhere, which was reviewed.  PAST MEDICAL/SURGICAL HISTORY:                                                    I personally reviewed patient's past medical and surgical history with the patient at today's visit.  MEDICATIONS:                                                    I personally reviewed patient's medications and allergies with the patient at today's visit.  ALLERGIES:                                                      Allergies   Allergen Reactions     Cats      Dogs      Seasonal Allergies      EXAM:                                                    VITAL SIGNS:   /71   Pulse 115   SpO2 99%   Mini-Cog Assessment:  Mini Cog Assessment  Clock Draw Score: 2 Normal  3 Item Recall: 3 objects recalled  Mini Cog Total Score: 5    General: pt is in NAD, cooperative.  Skin: normal turgor, moist mucous membranes, no lesions/rashes noticed.  HEENT: ATNC, EOMI, PERRL, white sclera, normal conjunctiva, no nystagmus or ptosis. No carotid bruits bilaterally.  Respiratory: lung sounds clear to auscultation bilaterally, no crackles, wheezes, rhonchi. Symmetric lung excursion, no accessory respiratory muscle  use.  Cardiovascular: normal S1/S2, no murmurs/rubs/gallops.   Abdomen: Not distended.  : deferred.    Neurological:  Mental: alert, follows commands, Mini Cog Total Score: 5/5 with 3/3 on memory recall, no aphasia or dysarthria. Fund of knowledge is appropriate for age.  Cranial Nerves:  CN II: visual acuity - able to accurately count fingers with each eye. Visual fields - partial right hemianopsia, fundi: discs sharp, no papilledema and normal vessels bilaterally.  CN III, IV, VI: EOM intact, pupils equal and reactive  CN V: facial sensation to pinprick is increased in the right V1 distribution  CN VII: face symmetric, no facial droop  CN VIII: hearing significantly reduced bilaterally  CN IX: palate elevation symmetric, uvula at midline  CN XI SCM normal, shoulder shrug nl  CN XII: tongue midline  Motor: Strength: 5/5 in all major groups of all extremities. Normal tone. No abnormal movements. No pronator drift b/l.  Reflexes: Triceps, biceps, brachioradialis, and patellar reflexes normal and symmetric, achilles reflexes are reduced bilaterally. No clonus noted. Toes are down-going b/l.   Sensory: light touch, pinprick, and vibration intact. Romberg: negative.  Coordination: FNF and heel-shin tests intact b/l.   Gait:  Normal, able to tandem walk with mild difficulty.  DATA:   LABS/EEG/IMAGING/OTHER STUDIES: I reviewed pertinent medical records, as detailed in the history of present illness.  ASSESSMENT AND PLAN:      ASSESSMENT: Bonny Raymundo is a 76 year old female patient with listed above past medical history, who presents with left occipital stroke.    We had a detailed discussion with the patient regarding her presenting complaints.  The neurological exam today is supportive of her known diagnosis of stroke with resultant right hemianopsia and prior history of shingles with possible postherpetic neuralgia/hyperesthesia in the right V1 distribution.  I reviewed brain MRI and head/neck MRA images.  She is  already on Eliquis, reduced dosing due to her aplastic anemia.  The stroke occurred while on Eliquis.  I recommended the patient to discuss with her hematologist oncologist whether the dose needs to be increased or Eliquis should be switched to a different direct anticoagulant to prevent stroke recurrence.  I would also like to check her hemoglobin A1C and fasting lipid panel.  Do not think that we need to repeat her TTE or do prolonged cardiac monitoring as it will not change the management.    DIAGNOSES:    ICD-10-CM    1. Occipital stroke (H)  I63.9 Adult Neurology  Referral      2. 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    Symptoms and signs of stroke were discussed.  Patient was advised to call 911 with any SUDDEN onset of weakness, vision loss, speech problems, numbness, or severe headache of unknown cause.    For secondary stroke prevention, I counseled the patient to:  -Discuss with her hematologist oncologist whether the dose of Eliquis (Apixaban) could be further increased or if Eliquis should be changed to a different direct anticoagulant (preferred) to prevent stroke recurrence  -Perform the recommended laboratory work-up, including fasting lipid panel and hemoglobin A1C  -Consider starting statin if LDL is above the goal under the guidance of her primary care provider  -Long-term blood pressure goal is less than 130/85  -Long-term LDL goal is 40-70  -Long-term goal of hemoglobin A1C is less than 7.0  -Low-salt low-fat diet  -Regular aerobic exercise 30 minutes 3-4 times per week    Next follow-up appointment is on as needed basis.    Total Time: 61 minutes spent on the date of the encounter doing chart review, history and exam, documentation and  further activities per the note.    Eliseo Cuevas MD  [facility] Neurology  (Chart documentation was completed in part with Dragon voice-recognition software. Even though reviewed, some grammatical, spelling, and word errors may remain.)            Again, thank you for allowing me to participate in the care of your patient.        Sincerely,        Eliseo Cuevas MD

## 2022-10-19 NOTE — PATIENT INSTRUCTIONS
AFTER VISIT SUMMARY (AVS):    At today's visit we thoroughly discussed current symptoms, results of stroke evaluation, symptoms and signs of stroke, secondary stroke prevention measures, and the plan.    Symptoms and signs of stroke were discussed.  You should call 911 with any SUDDEN onset of weakness, vision loss, speech problems, numbness, or severe headache of unknown cause.    For secondary stroke prevention:  -Please discuss with your hematologist oncologist whether the dose of Eliquis (Apixaban) could be further increased or if Eliquis should be changed to a different direct anticoagulant to prevent stroke recurrence  -Please perform the recommended laboratory work-up, including fasting lipid panel and hemoglobin A1C  -Consider starting statin if LDL is above the goal  -Long-term blood pressure goal is less than 130/85  -Long-term LDL goal is 40-70  -Long-term goal of hemoglobin A1C is less than 7.0  -Low-salt low-fat diet  -Regular aerobic exercise 30 minutes 3-4 times per week    Next follow-up appointment is on as needed basis.    Please do not hesitate to call me with any questions or concerns.    Thanks.

## 2022-10-19 NOTE — PROGRESS NOTES
INITIAL NEUROLOGY CONSULTATION    DATE OF VISIT: 10/19/2022  CLINIC LOCATION: [facility]  MRN: [id number]  PATIENT NAME: Bonny Raymundo  YOB: 1945    REASON FOR VISIT:   Chief Complaint   Patient presents with     Referral     Homonymous bilateral field defects in visual field  Occipital stroke      HISTORY OF PRESENT ILLNESS:                                                    Ms. 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    Per patient's report, in June 2022 the patient woke up with right visual field defect affected her ability to walk.  She was seen in ophthalmology clinic and ordered to have MRI of the brain together with head and neck MRA along with neurology referral.    Brain MRI with and without contrast from 8/7/2022 demonstrated of zone of late subacute to early chronic infarct in the left posterior cerebral artery territory affecting the medial left occipital lobe and splenium of the corpus callosum, correlating with the provided history.  In addition, mild generalized atrophy and small vessel ischemic changes affecting left corona radiata, right thalamus, bilateral cerebellum and white matter were seen.  Head MRA was negative.  Neck MRI did not demonstrate any evidence for dissection or hemodynamically significant narrowing.  All images were personally reviewed and independently interpreted.    Most recent echocardiogram was done in December 2020 and was negative for intracardiac thrombus.  Most recent laboratory results from October 2020 to include creatinine of 1.19, glucose of 114, vitamin B12 1515, WBC of 2.1, hemoglobin of 6.6, hematocrit of 22.5, MCV of 109, and platelet count of 95.  Her LDL was last checked in April 2018 (119),  hemoglobin A1C was not checked in the past.    At the present time, the patient reports improvement of the right visual cut.  She is on 2.5 mg of Eliquis twice daily.  This dose was reduced in July 2022 by her hematologist oncologist.  She is not on statin. Complaints of itching on the right side of the scalp following shingles. She denies any additional focal neurological symptoms.  Reports several episodes of diarrhea that started this morning.  Has a visit with a provider to discuss.    No additional useful information is available in Care Everywhere, which was reviewed.  PAST MEDICAL/SURGICAL HISTORY:                                                    I personally reviewed patient's past medical and surgical history with the patient at today's visit.  MEDICATIONS:                                                    I personally reviewed patient's medications and allergies with the patient at today's visit.  ALLERGIES:                                                      Allergies   Allergen Reactions     Cats      Dogs      Seasonal Allergies      EXAM:                                                    VITAL SIGNS:   /71   Pulse 115   SpO2 99%   Mini-Cog Assessment:  Mini Cog Assessment  Clock Draw Score: 2 Normal  3 Item Recall: 3 objects recalled  Mini Cog Total Score: 5    General: pt is in NAD, cooperative.  Skin: normal turgor, moist mucous membranes, no lesions/rashes noticed.  HEENT: ATNC, EOMI, PERRL, white sclera, normal conjunctiva, no nystagmus or ptosis. No carotid bruits bilaterally.  Respiratory: lung sounds clear to auscultation bilaterally, no crackles, wheezes, rhonchi. Symmetric lung excursion, no accessory respiratory muscle use.  Cardiovascular: normal S1/S2, no murmurs/rubs/gallops.   Abdomen: Not distended.  : deferred.    Neurological:  Mental: alert, follows commands, Mini Cog Total Score: 5/5 with 3/3 on memory recall, no aphasia or dysarthria. Fund of knowledge is appropriate  for age.  Cranial Nerves:  CN II: visual acuity - able to accurately count fingers with each eye. Visual fields - partial right hemianopsia, fundi: discs sharp, no papilledema and normal vessels bilaterally.  CN III, IV, VI: EOM intact, pupils equal and reactive  CN V: facial sensation to pinprick is increased in the right V1 distribution  CN VII: face symmetric, no facial droop  CN VIII: hearing significantly reduced bilaterally  CN IX: palate elevation symmetric, uvula at midline  CN XI SCM normal, shoulder shrug nl  CN XII: tongue midline  Motor: Strength: 5/5 in all major groups of all extremities. Normal tone. No abnormal movements. No pronator drift b/l.  Reflexes: Triceps, biceps, brachioradialis, and patellar reflexes normal and symmetric, achilles reflexes are reduced bilaterally. No clonus noted. Toes are down-going b/l.   Sensory: light touch, pinprick, and vibration intact. Romberg: negative.  Coordination: FNF and heel-shin tests intact b/l.   Gait:  Normal, able to tandem walk with mild difficulty.  DATA:   LABS/EEG/IMAGING/OTHER STUDIES: I reviewed pertinent medical records, as detailed in the history of present illness.  ASSESSMENT AND PLAN:      ASSESSMENT: Bonny Raymundo is a 76 year old female patient with listed above past medical history, who presents with left occipital stroke.    We had a detailed discussion with the patient regarding her presenting complaints.  The neurological exam today is supportive of her known diagnosis of stroke with resultant right hemianopsia and prior history of shingles with possible postherpetic neuralgia/hyperesthesia in the right V1 distribution.  I reviewed brain MRI and head/neck MRA images.  She is already on Eliquis, reduced dosing due to her aplastic anemia.  The stroke occurred while on Eliquis.  I recommended the patient to discuss with her hematologist oncologist whether the dose needs to be increased or Eliquis should be switched to a different direct  anticoagulant to prevent stroke recurrence.  I would also like to check her hemoglobin A1C and fasting lipid panel.  Do not think that we need to repeat her TTE or do prolonged cardiac monitoring as it will not change the management.    DIAGNOSES:    ICD-10-CM    1. Occipital stroke (H)  I63.9 Adult Neurology  Referral      2. 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    Symptoms and signs of stroke were discussed.  Patient was advised to call 911 with any SUDDEN onset of weakness, vision loss, speech problems, numbness, or severe headache of unknown cause.    For secondary stroke prevention, I counseled the patient to:  -Discuss with her hematologist oncologist whether the dose of Eliquis (Apixaban) could be further increased or if Eliquis should be changed to a different direct anticoagulant (preferred) to prevent stroke recurrence  -Perform the recommended laboratory work-up, including fasting lipid panel and hemoglobin A1C  -Consider starting statin if LDL is above the goal under the guidance of her primary care provider  -Long-term blood pressure goal is less than 130/85  -Long-term LDL goal is 40-70  -Long-term goal of hemoglobin A1C is less than 7.0  -Low-salt low-fat diet  -Regular aerobic exercise 30 minutes 3-4 times per week    Next follow-up appointment is on as needed basis.    Total Time: 61 minutes spent on the date of the encounter doing chart review, history and exam, documentation and further activities per the note.    Eliseo Cuevas MD  [facility] Neurology  (Chart documentation was completed in part with Dragon voice-recognition software. Even though reviewed, some grammatical, spelling, and word errors may remain.)

## 2022-10-21 ENCOUNTER — TELEPHONE (OUTPATIENT)
Dept: GASTROENTEROLOGY | Facility: CLINIC | Age: 77
End: 2022-10-21

## 2022-10-21 ENCOUNTER — PATIENT OUTREACH (OUTPATIENT)
Dept: GASTROENTEROLOGY | Facility: CLINIC | Age: 77
End: 2022-10-21

## 2022-10-21 NOTE — CONFIDENTIAL NOTE
Received callback from the patient regarding EGD/colonoscopy procedure. Provided explanation for how scheduling works and that both procedures are on the same day/same appointment. Transferred the patient to endoscopy scheduling.   Procedure scheduled for 1/4/23.

## 2022-10-21 NOTE — TELEPHONE ENCOUNTER
Screening Questions  BLUE  KIND OF PREP RED  LOCATION [review exclusion criteria] GREEN  SEDATION TYPE        N Are you active on mychart?       Sean Dawson MD    Ordering/Referring Provider?        [facility]/MEDICARE What type of coverage do you have?      N Have you had a positive covid test in the last 90 days?     18.3 1. BMI  [BMI 40+ - review exclusion criteria]    Y  2. Are you able to give consent for your medical care? [IF NO,RN REVIEW]        N  3. Are you taking any prescription pain medications on a routine schedule?        3a. EXTENDED PREP What kind of prescription?     N 4. Do you have any chemical dependencies such as alcohol, street drugs, or methadone?    N 5. Do you have any history of post-traumatic stress syndrome, severe anxiety or history of psychosis?      **If yes 3- 5 , please schedule with MAC sedation.**          IF YES TO ANY 6 - 10 - HOSPITAL SETTING ONLY.     N 6.   Do you need assistance transferring?     N 7.   Have you had a heart or lung transplant?    N 8.   Are you currently on dialysis?   N 9.   Do you use daily home oxygen?   N 10. Do you take nitroglycerin?   10a.  If yes, how often?     11. [FEMALES]  N Are you currently pregnant?    11a.  If yes, how many weeks? [ Greater than 12 weeks, OR NEEDED]    N 12. Do you have Pulmonary Hypertension? *NEED PAC APPT AT [facility]*     N 13. [review exclusion criteria]  Do you have any implantable devices in your body (pacemaker, defib, LVAD)?    N 14. In the past 6 months, have you had any heart related issues including cardiomyopathy or heart attack?     14a.  If yes, did it require cardiac stenting if so when?     Y 15. Have you had a stroke or Transient ischemic attack (TIA - aka  mini stroke ) within 6 months?      N 16. Do you have mod to severe Obstructive Sleep Apnea?  [Hospital only - Ok at [place]]    N 17. Do you have SEVERE AND UNCONTROLLED asthma? *NEED PAC APPT AT [facility]*     Y 18. Are you currently taking any  "blood thinners?     18a. If yes, inform patient to \"follow up w/ ordering provider for bridging instructions.\"    N 19. Do you take the medication Phentermine?    19a. If yes, \"Hold for 7 days before procedure.  Please consult your prescribing provider if you have questions about holding this medication.\"     N  20. Do you have chronic kidney disease?      N  21. Do you have a diagnosis of diabetes?     N  22. On a regular basis do you go 3-5 days between bowel movements?      23. Preferred LOCAL Pharmacy for Pre Prescription    [ LIST ONLY ONE PHARMACY]        [facility]/PHARMACY #[id number] [facility] [address]      - CLOSING REMINDERS -    Informed patient they will need an adult    Cannot take any type of public or medical transportation alone    Conscious Sedation- Needs  for 6 hours after the procedure       MAC/General-Needs  for 24 hours after procedure    Pre-Procedure Covid test to be completed [[facility] PCR Testing Required]    Confirmed Nurse will call to complete assessment       - SCHEDULING DETAILS -     LEILANI  Surgeon    1/4  Date of Procedure  Upper and Lower Endoscopy [EGD and Colonoscopy]  Type of Procedure Scheduled   [facility] PREP-If you answer yes to questions #8, #20, #21Which Colonoscopy Prep was Sent?     MAC, PER ORDER Sedation Type     Y, PT WILL SCHEDULE WITH PCP PAC / Pre-op Required         Additional comments:            "

## 2022-10-26 LAB
ABO/RH(D): NORMAL
ANTIBODY SCREEN: NEGATIVE
SPECIMEN EXPIRATION DATE: NORMAL

## 2022-10-27 ENCOUNTER — TELEPHONE (OUTPATIENT)
Dept: INFUSION THERAPY | Facility: CLINIC | Age: 77
End: 2022-10-27

## 2022-10-27 ENCOUNTER — LAB (OUTPATIENT)
Dept: INFUSION THERAPY | Facility: CLINIC | Age: 77
End: 2022-10-27
Attending: INTERNAL MEDICINE
Payer: COMMERCIAL

## 2022-10-27 VITALS
RESPIRATION RATE: 18 BRPM | TEMPERATURE: 98.2 F | OXYGEN SATURATION: 98 % | HEART RATE: 80 BPM | DIASTOLIC BLOOD PRESSURE: 67 MMHG | SYSTOLIC BLOOD PRESSURE: 102 MMHG

## 2022-10-27 DIAGNOSIS — R63.4 WEIGHT LOSS, UNINTENTIONAL: ICD-10-CM

## 2022-10-27 DIAGNOSIS — D61.3 IDIOPATHIC APLASTIC ANEMIA (H): ICD-10-CM

## 2022-10-27 DIAGNOSIS — N18.4 CKD (CHRONIC KIDNEY DISEASE) STAGE 4, GFR 15-29 ML/MIN (H): ICD-10-CM

## 2022-10-27 DIAGNOSIS — D63.1 ANEMIA OF CHRONIC RENAL FAILURE, STAGE 4 (SEVERE) (H): Primary | ICD-10-CM

## 2022-10-27 DIAGNOSIS — D61.818 PANCYTOPENIA (H): Primary | ICD-10-CM

## 2022-10-27 DIAGNOSIS — N18.4 ANEMIA OF CHRONIC RENAL FAILURE, STAGE 4 (SEVERE) (H): Primary | ICD-10-CM

## 2022-10-27 DIAGNOSIS — D70.8 OTHER NEUTROPENIA (H): Primary | ICD-10-CM

## 2022-10-27 LAB
ERYTHROCYTE [DISTWIDTH] IN BLOOD BY AUTOMATED COUNT: 19.6 % (ref 10–15)
HCT VFR BLD AUTO: 23 % (ref 35–47)
HGB BLD-MCNC: 6.9 G/DL (ref 11.7–15.7)
MCH RBC QN AUTO: 32.7 PG (ref 26.5–33)
MCHC RBC AUTO-ENTMCNC: 30 G/DL (ref 31.5–36.5)
MCV RBC AUTO: 109 FL (ref 78–100)
PLATELET # BLD AUTO: 65 10E3/UL (ref 150–450)
RBC # BLD AUTO: 2.11 10E6/UL (ref 3.8–5.2)
WBC # BLD AUTO: 2.4 10E3/UL (ref 4–11)

## 2022-10-27 PROCEDURE — 36415 COLL VENOUS BLD VENIPUNCTURE: CPT

## 2022-10-27 PROCEDURE — 250N000011 HC RX IP 250 OP 636: Performed by: INTERNAL MEDICINE

## 2022-10-27 PROCEDURE — 86923 COMPATIBILITY TEST ELECTRIC: CPT | Performed by: INTERNAL MEDICINE

## 2022-10-27 PROCEDURE — 96372 THER/PROPH/DIAG INJ SC/IM: CPT | Performed by: INTERNAL MEDICINE

## 2022-10-27 PROCEDURE — 85007 BL SMEAR W/DIFF WBC COUNT: CPT | Performed by: INTERNAL MEDICINE

## 2022-10-27 PROCEDURE — 85014 HEMATOCRIT: CPT | Performed by: INTERNAL MEDICINE

## 2022-10-27 PROCEDURE — 86850 RBC ANTIBODY SCREEN: CPT | Performed by: INTERNAL MEDICINE

## 2022-10-27 RX ORDER — DIPHENHYDRAMINE HYDROCHLORIDE 50 MG/ML
50 INJECTION INTRAMUSCULAR; INTRAVENOUS
Status: CANCELLED
Start: 2023-01-01

## 2022-10-27 RX ORDER — ALBUTEROL SULFATE 90 UG/1
1-2 AEROSOL, METERED RESPIRATORY (INHALATION)
Status: CANCELLED
Start: 2023-01-01

## 2022-10-27 RX ORDER — METHYLPREDNISOLONE SODIUM SUCCINATE 125 MG/2ML
125 INJECTION, POWDER, LYOPHILIZED, FOR SOLUTION INTRAMUSCULAR; INTRAVENOUS
Status: CANCELLED
Start: 2022-10-27

## 2022-10-27 RX ORDER — MEPERIDINE HYDROCHLORIDE 25 MG/ML
25 INJECTION INTRAMUSCULAR; INTRAVENOUS; SUBCUTANEOUS EVERY 30 MIN PRN
Status: CANCELLED | OUTPATIENT
Start: 2022-10-27

## 2022-10-27 RX ORDER — ALBUTEROL SULFATE 90 UG/1
1-2 AEROSOL, METERED RESPIRATORY (INHALATION)
Status: CANCELLED
Start: 2022-10-27

## 2022-10-27 RX ORDER — DIPHENHYDRAMINE HYDROCHLORIDE 50 MG/ML
50 INJECTION INTRAMUSCULAR; INTRAVENOUS
Status: CANCELLED
Start: 2022-10-27

## 2022-10-27 RX ORDER — MEPERIDINE HYDROCHLORIDE 25 MG/ML
25 INJECTION INTRAMUSCULAR; INTRAVENOUS; SUBCUTANEOUS EVERY 30 MIN PRN
Status: CANCELLED | OUTPATIENT
Start: 2023-01-01

## 2022-10-27 RX ORDER — EPINEPHRINE 1 MG/ML
0.3 INJECTION, SOLUTION INTRAMUSCULAR; SUBCUTANEOUS EVERY 5 MIN PRN
Status: CANCELLED | OUTPATIENT
Start: 2023-01-01

## 2022-10-27 RX ORDER — ALBUTEROL SULFATE 0.83 MG/ML
2.5 SOLUTION RESPIRATORY (INHALATION)
Status: CANCELLED | OUTPATIENT
Start: 2022-10-27

## 2022-10-27 RX ORDER — EPINEPHRINE 1 MG/ML
0.3 INJECTION, SOLUTION INTRAMUSCULAR; SUBCUTANEOUS EVERY 5 MIN PRN
Status: CANCELLED | OUTPATIENT
Start: 2022-10-27

## 2022-10-27 RX ORDER — ALBUTEROL SULFATE 0.83 MG/ML
2.5 SOLUTION RESPIRATORY (INHALATION)
Status: CANCELLED | OUTPATIENT
Start: 2023-01-01

## 2022-10-27 RX ORDER — METHYLPREDNISOLONE SODIUM SUCCINATE 125 MG/2ML
125 INJECTION, POWDER, LYOPHILIZED, FOR SOLUTION INTRAMUSCULAR; INTRAVENOUS
Status: CANCELLED
Start: 2023-01-01

## 2022-10-27 RX ADMIN — DARBEPOETIN ALFA 300 MCG: 300 INJECTION, SOLUTION INTRAVENOUS; SUBCUTANEOUS at 12:52

## 2022-10-27 ASSESSMENT — PAIN SCALES - GENERAL: PAINLEVEL: NO PAIN (0)

## 2022-10-27 NOTE — TELEPHONE ENCOUNTER
Left voicemail message requesting a return call regarding scheduling blood transfusion, time held for Saturday 10/29/22.

## 2022-10-27 NOTE — PROGRESS NOTES
Infusion Nursing Note:  Bonny Raymundo presents today for Aranesp.    Patient seen by provider today: No   present during visit today: Not Applicable.    Note: N/A.    Intravenous Access:  No Intravenous access/labs at this visit.    Treatment Conditions:  Lab Results   Component Value Date    HGB 6.9 (LL) 10/27/2022    WBC 2.4 (L) 10/27/2022    ANEU 1.0 (L) 10/05/2022    ANEUTAUTO 1.3 (L) 09/12/2022    PLT 65 (L) 10/27/2022      Results reviewed, labs MET treatment parameters, ok to proceed with treatment.    Post Infusion Assessment:  Patient tolerated injection without incident.     Discharge Plan:   Patient and/or family verbalized understanding of discharge instructions and all questions answered.  Patient discharged in stable condition accompanied by: self.  Departure Mode: Ambulatory.      Merry Bean RN

## 2022-10-27 NOTE — PROGRESS NOTES
Medical Assistant Note:  Bonny Raymundo presents today for blood draw.    Patient seen by provider today: No.   present during visit today: Not Applicable.    Concerns: No Concerns.    Procedure:  Lab draw site: rac, Needle type: bf, Gauge: 23.    Post Assessment:  Labs drawn without difficulty: No.    Discharge Plan:  Departure Mode: Ambulatory.    Face to Face Time: 5 min  .    Bianca King, CMA

## 2022-10-28 LAB
BASOPHILS # BLD MANUAL: 0 10E3/UL (ref 0–0.2)
BASOPHILS NFR BLD MANUAL: 2 %
BLASTS # BLD MANUAL: 0 10E3/UL
BLASTS NFR BLD MANUAL: 2 %
BLD PROD TYP BPU: NORMAL
BLOOD COMPONENT TYPE: NORMAL
CODING SYSTEM: NORMAL
CROSSMATCH: NORMAL
EOSINOPHIL # BLD MANUAL: 0 10E3/UL (ref 0–0.7)
EOSINOPHIL NFR BLD MANUAL: 0 %
ISSUE DATE AND TIME: NORMAL
LYMPHOCYTES # BLD MANUAL: 0.8 10E3/UL (ref 0.8–5.3)
LYMPHOCYTES NFR BLD MANUAL: 33 %
MONOCYTES # BLD MANUAL: 0.5 10E3/UL (ref 0–1.3)
MONOCYTES NFR BLD MANUAL: 19 %
NEUTROPHILS # BLD MANUAL: 1.1 10E3/UL (ref 1.6–8.3)
NEUTROPHILS NFR BLD MANUAL: 44 %
NRBC # BLD AUTO: 0.1 10E3/UL
NRBC BLD MANUAL-RTO: 3 %
PATH REV: ABNORMAL
PLAT MORPH BLD: ABNORMAL
RBC MORPH BLD: ABNORMAL
UNIT ABO/RH: NORMAL
UNIT NUMBER: NORMAL
UNIT STATUS: NORMAL
UNIT TYPE ISBT: 6200

## 2022-10-28 RX ORDER — HEPARIN SODIUM (PORCINE) LOCK FLUSH IV SOLN 100 UNIT/ML 100 UNIT/ML
5 SOLUTION INTRAVENOUS
Status: CANCELLED | OUTPATIENT
Start: 2022-10-28

## 2022-10-28 RX ORDER — HEPARIN SODIUM,PORCINE 10 UNIT/ML
5 VIAL (ML) INTRAVENOUS
Status: CANCELLED | OUTPATIENT
Start: 2022-10-28

## 2022-10-29 ENCOUNTER — INFUSION THERAPY VISIT (OUTPATIENT)
Dept: INFUSION THERAPY | Facility: CLINIC | Age: 77
End: 2022-10-29
Attending: INTERNAL MEDICINE
Payer: COMMERCIAL

## 2022-10-29 VITALS
HEART RATE: 60 BPM | DIASTOLIC BLOOD PRESSURE: 50 MMHG | TEMPERATURE: 98.3 F | SYSTOLIC BLOOD PRESSURE: 101 MMHG | RESPIRATION RATE: 18 BRPM | OXYGEN SATURATION: 96 %

## 2022-10-29 DIAGNOSIS — D61.818 PANCYTOPENIA (H): ICD-10-CM

## 2022-10-29 DIAGNOSIS — D61.9 APLASTIC ANEMIA (H): Primary | ICD-10-CM

## 2022-10-29 PROCEDURE — P9016 RBC LEUKOCYTES REDUCED: HCPCS | Performed by: INTERNAL MEDICINE

## 2022-10-29 PROCEDURE — 36430 TRANSFUSION BLD/BLD COMPNT: CPT

## 2022-10-29 RX ORDER — HEPARIN SODIUM (PORCINE) LOCK FLUSH IV SOLN 100 UNIT/ML 100 UNIT/ML
5 SOLUTION INTRAVENOUS
Status: DISCONTINUED | OUTPATIENT
Start: 2022-10-29 | End: 2022-10-29 | Stop reason: HOSPADM

## 2022-10-29 RX ORDER — HEPARIN SODIUM,PORCINE 10 UNIT/ML
5 VIAL (ML) INTRAVENOUS
Status: DISCONTINUED | OUTPATIENT
Start: 2022-10-29 | End: 2022-10-29 | Stop reason: HOSPADM

## 2022-10-29 NOTE — PROGRESS NOTES
Infusion Nursing Note:  Bonny Raymundo presents today for 1u PRBC.    Patient seen by provider today: No   present during visit today: Not Applicable.    Note: N/A.    Intravenous Access:  Peripheral IV placed.    Treatment Conditions:  Lab Results   Component Value Date    HGB 6.9 (LL) 10/27/2022    WBC 2.4 (L) 10/27/2022    ANEU 1.1 (L) 10/27/2022    ANEUTAUTO 1.3 (L) 09/12/2022    PLT 65 (L) 10/27/2022      Results reviewed, labs MET treatment parameters, ok to proceed with treatment.  Blood transfusion consent signed 10/5/22.    Post Infusion Assessment:  Patient tolerated infusion without incident.  Blood return noted pre and post infusion.  Site patent and intact, free from redness, edema or discomfort.  No evidence of extravasations.  Access discontinued per protocol.     Discharge Plan:   Discharge instructions reviewed with: Patient.  Patient and/or family verbalized understanding of discharge instructions and all questions answered.  Patient discharged in stable condition accompanied by: self.  Departure Mode: Ambulatory.      Kathy Corea RN

## 2022-11-01 ENCOUNTER — HOSPITAL ENCOUNTER (OUTPATIENT)
Dept: PHYSICAL THERAPY | Facility: CLINIC | Age: 77
Discharge: HOME | End: 2022-11-01
Attending: INTERNAL MEDICINE
Payer: COMMERCIAL

## 2022-11-01 DIAGNOSIS — M62.81 GENERALIZED MUSCLE WEAKNESS: Primary | ICD-10-CM

## 2022-11-01 DIAGNOSIS — R26.89 LOSS OF BALANCE: ICD-10-CM

## 2022-11-01 PROCEDURE — 97162 PT EVAL MOD COMPLEX 30 MIN: CPT | Mod: GP | Performed by: PHYSICAL THERAPIST

## 2022-11-01 ASSESSMENT — 6 MINUTE WALK TEST (6MWT): TOTAL DISTANCE WALKED (FT): 750

## 2022-11-01 NOTE — PROGRESS NOTES
11/01/22 1000   Signing Clinician's Name / Credentials   Signing clinician's name / credentials Jennifer Snider MPT//   Functional Gait Assessment (BARB Macario., Chirag GCaitlyn F., et al. (2004))   1. GAIT LEVEL SURFACE 2   2. CHANGE IN GAIT SPEED 0   3. GAIT WITH HORIZONTAL HEAD TURNS 2   4. GAIT WITH VERTICAL HEAD TURNS 2   5. GAIT AND PIVOT TURN 2   6. STEP OVER OBSTACLE 3   7. GAIT WITH NARROW BASE OF SUPPORT 0   8. GAIT WITH EYES CLOSED 1   9. AMBULATING BACKWARDS 2   10. STEPS 2   Total Functional Gait Assessment Score   TOTAL SCORE: (MAXIMUM SCORE 30) 16

## 2022-11-02 NOTE — PROGRESS NOTES
11/01/22 1000   Quick Adds   Type of Visit Initial OP PT Evaluation   General Information   Start of Care Date 11/01/22   Referring Physician Dr. Shelli Cash   Orders Evaluate and Treat as Indicated   Order Date 10/05/22   Medical Diagnosis Loss of balance   Onset of illness/injury or Date of Surgery 10/05/22   Surgical/Medical history reviewed Yes   Pertinent history of current problem (include personal factors and/or comorbidities that impact the POC) Saw PT about 5 years ago.  Recently had a CVA in June 2022 which has affected her balance. Feels she can't walk a straight line. Reports she is very sedentary at baseline.  She lays down intermittantly throughout the day.i.e.after each meal or any small activity Reports R edge of R eye is blurry.  Has not prevented her from driving.  Wears hearing aids but did not bring them today.  Reports has lost a lot of weight  like 35-40 lbs and has requested consultation with nutritionist. Has had intermittant diarrhea which has left her weak and without energy. PMH: tube in L ear.   Pertinent Visual History  see above   Prior level of function comment Has been sedentary for a while.   Living environment House/townhome   Home/Community Accessibility Comments Cleaning assist every 3 months.  4 steps to get into back door and has steps to the basement.  Does them 1 at a time.   Patient/Family Goals Statement improve balance   Fall Risk Screen   Have you fallen 2 or more times in the past year? Yes   Have you fallen and had an injury in the past year? No   Cognitive Status Examination   Level of Consciousness alert   Observation   Observation Very thin elderly female. SOB with walking into the clinic.   Range of Motion (ROM)   ROM Comment Decreased B shoulder ROM   Strength   Strength Comments General weakness in all muscle groups about 3+ to 4-/5.   Bed Mobility   Bed Mobility Comments Independent   Transfer Skills   Transfer Comments Independent   Gait   Gait Comments Walks  slowly. Decreased step length, INcreased path deviation.   Gait Special Tests 25 Foot Timed Walk   Seconds 9.43   Gait Special Tests Six Minute Walk Test   Feet 750 Feet   Comments Stopped after 4 min at 600 ft for 1 minute and then finished with additional 150 ft.   Balance Special Tests Modified CTSIB Conditions   Condition 1, seconds 30 Seconds   Condition 2, seconds 30 Seconds   Condition 4, seconds 30 Seconds   Condition 5, seconds 30 Seconds   Balance Special Tests Sit to Stand Reps in 30 Seconds   Reps in 30 seconds 3   Planned Therapy Interventions   Planned Therapy Interventions balance training;gait training;neuromuscular re-education;ROM;strengthening;stretching;transfer training   Clinical Impression   Criteria for Skilled Therapeutic Interventions Met yes, treatment indicated   PT Diagnosis Weakness, Impaired gait   Influenced by the following impairments decreased strength, decreased balance   Functional limitations due to impairments at increased risk for falls. , low activity tolerance   Clinical Presentation Evolving/Changing   Clinical Presentation Rationale multiple medical issues that are not resolved right now, sedentary lifestyle, 30 sec STS, 6MWT, FGA   Clinical Decision Making (Complexity) Moderate complexity   Therapy Frequency 1 time/week   Predicted Duration of Therapy Intervention (days/wks) 90 days   Risk & Benefits of therapy have been explained Yes   Patient, Family & other staff in agreement with plan of care Yes   Clinical Impression Comments Recommended 2x/week but client only wanted to come in 1x/week citing multiple medical appts.   Goal 1   Goal Identifier 6MWT   Goal Description The client will demonstate improved activity tolerance by being able to walk 900 ft on the 6MWT   Goal Progress baseline: 750 ft   Target Date 01/30/23   Goal 2   Goal Identifier FGA   Goal Description Client will demonstrate improved standing balance by scoring at least a 22/30 on the FGA   Goal  Progress baseline: 16/30   Target Date 01/30/23   Goal 3   Goal Identifier 30 sec STS   Goal Description Client will demonstrate improved functional strength by scoring at least 8 reps on the 30 sec STS   Goal Progress baseline: 3 reps   Target Date 01/30/23   Total Evaluation Time   PT Rick, Moderate Complexity Minutes (57459) 40

## 2022-11-02 NOTE — PROGRESS NOTES
[facility]                                                                                   OUTPATIENT PHYSICAL THERAPY FUNCTIONAL EVALUATION  PLAN OF TREATMENT FOR OUTPATIENT REHABILITATION  (COMPLETE FOR INITIAL CLAIMS ONLY)  Patient's Last Name, First Name, M.I.  YOB: 1945  Bonny Raymundo     Provider's Name   [facility]   Medical Record No.  [id number]     Start of Care Date:  11/01/22   Onset Date:  10/05/22   Type:     _X__PT   ____OT  ____SLP Medical Diagnosis:   Loss of balance     PT Diagnosis:  Weakness, Impaired gait Visits from SOC:  1                              __________________________________________________________________________________  Plan of Treatment/Functional Goals:  balance training, gait training, neuromuscular re-education, ROM, strengthening, stretching, transfer training           GOALS  6MWT  The client will demonstate improved activity tolerance by being able to walk 900 ft on the 6MWT  01/30/23    FGA  Client will demonstrate improved standing balance by scoring at least a 22/30 on the FGA  01/30/23    30 sec STS  Client will demonstrate improved functional strength by scoring at least 8 reps on the 30 sec STS  01/30/23                          Therapy Frequency:  1 time/week   Predicted Duration of Therapy Intervention:  90 days    Jennifer Toussaint, PT                                    I CERTIFY THE NEED FOR THESE SERVICES FURNISHED UNDER        THIS PLAN OF TREATMENT AND WHILE UNDER MY CARE     (Physician co-signature of this document indicates review and certification of the therapy plan).                Certification Date From:    11/1/22  Certification Date To:   1/30/23    Referring Provider:  Dr. Shelli Cash    Initial Assessment  See Epic Evaluation- Start of Care Date: 11/01/22

## 2022-11-07 ENCOUNTER — HOSPITAL ENCOUNTER (OUTPATIENT)
Dept: PHYSICAL THERAPY | Facility: CLINIC | Age: 77
Discharge: HOME | End: 2022-11-07
Payer: COMMERCIAL

## 2022-11-07 DIAGNOSIS — R26.89 LOSS OF BALANCE: Primary | ICD-10-CM

## 2022-11-07 DIAGNOSIS — M62.81 GENERALIZED MUSCLE WEAKNESS: ICD-10-CM

## 2022-11-07 PROCEDURE — 97112 NEUROMUSCULAR REEDUCATION: CPT | Mod: GP

## 2022-11-07 PROCEDURE — 97110 THERAPEUTIC EXERCISES: CPT | Mod: GP

## 2022-11-08 ENCOUNTER — OFFICE VISIT (OUTPATIENT)
Dept: DERMATOLOGY | Facility: CLINIC | Age: 77
End: 2022-11-08
Attending: INTERNAL MEDICINE
Payer: COMMERCIAL

## 2022-11-08 ENCOUNTER — PATIENT OUTREACH (OUTPATIENT)
Dept: ONCOLOGY | Facility: CLINIC | Age: 77
End: 2022-11-08

## 2022-11-08 ENCOUNTER — TELEPHONE (OUTPATIENT)
Dept: FAMILY MEDICINE | Facility: CLINIC | Age: 77
End: 2022-11-08

## 2022-11-08 DIAGNOSIS — N18.4 ANEMIA OF CHRONIC RENAL FAILURE, STAGE 4 (SEVERE) (H): ICD-10-CM

## 2022-11-08 DIAGNOSIS — D63.1 ANEMIA OF CHRONIC RENAL FAILURE, STAGE 4 (SEVERE) (H): ICD-10-CM

## 2022-11-08 DIAGNOSIS — N18.4 CKD (CHRONIC KIDNEY DISEASE) STAGE 4, GFR 15-29 ML/MIN (H): ICD-10-CM

## 2022-11-08 DIAGNOSIS — L20.81 ATOPIC NEURODERMATITIS: Primary | ICD-10-CM

## 2022-11-08 DIAGNOSIS — R63.4 WEIGHT LOSS, UNINTENTIONAL: Primary | ICD-10-CM

## 2022-11-08 DIAGNOSIS — L29.9 ITCHING: ICD-10-CM

## 2022-11-08 PROCEDURE — 99204 OFFICE O/P NEW MOD 45 MIN: CPT | Performed by: PHYSICIAN ASSISTANT

## 2022-11-08 RX ORDER — TRIAMCINOLONE ACETONIDE 1 MG/G
CREAM TOPICAL
Qty: 454 G | Refills: 2 | Status: SHIPPED | OUTPATIENT
Start: 2022-11-08 | End: 2023-01-01

## 2022-11-08 NOTE — TELEPHONE ENCOUNTER
Patient called left message requesting a referral for a Nutritionist.. Patient says she has lost weight. Patient went from weighing 119 down to 93 pounds. Sending to Triage was not sure if patient should be seen. Please call patient if referral to nutritionist is completed. [phone]. Thanks    Bambi PAK-referral coordinator

## 2022-11-08 NOTE — LETTER
11/8/2022         RE: Bonny Raymundo  [address]  [place] [address]        Dear Colleague,    Thank you for referring your patient, Bonny Raymundo, to the [facility]. Please see a copy of my visit note below.    HPI:   Chief complaints: Bonny Raymundo is a pleasant 76 year old female who presents for evaluation of itching around the torso; mainly across her stomach and lower back. She has had this for awhile. The itch is intermittent and will last for a few minutes then resolve. She does not use moisturizers. 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 She does have decreased kidney functions but does not receive treatment for this.       PHYSICAL EXAM:    There were no vitals taken for this visit.  Skin exam performed as follows: Type 2 skin. Mood appropriate  Alert and Oriented X 3. Well developed, well nourished in no distress.  General appearance: Normal  Head including face: Normal  Eyes: conjunctiva and lids: Normal  Mouth: Lips, teeth, gums: Normal  Neck: Normal  Cardiovascular: Exam of peripheral vascular system by observation for swelling, varicosities, edema: Normal  Right upper extremity: Normal  Left upper extremity: Normal  Right lower extremity: Normal  Left lower extremity: Normal  Skin: Scalp and body hair: See below    Xerosis, excoriation and dermatitis on the lower back and abdomen; xerosis of arms and legs    ASSESSMENT/PLAN:     1. Atopic neurodermatitis, xerosis, skin pruritus; ? Uremic pruritis? - discussed diagnoses and treatment options. Will start with topical steroids and emollients; discussed NBUVB if struggling as she lives close to the clinic.   --Start TAC cream BID x 1-2 weeks PRN flares  --Thick emollients daily  --Shower daily          Follow-up: 4-6 months/PRN sooner  CC:   Scribed By: Karley Massey, MS, PA-C          Again, thank you for allowing me to participate in the care of your patient.         Sincerely,        Karley Hogan PA-C

## 2022-11-08 NOTE — PATIENT INSTRUCTIONS
Directions for dry skin:  Bathe every day - we recommend short showers or baths (5 minutes or less) with lukewarm water.  Use a gentle soap such as Dove for sensitive skin, Cetaphil, Vanicream or CeraVe   Wash the  dirty areas  only - this means armpits, groin, buttocks and feet.   After the bath or shower, within 2 minutes:   Pat dry with towel    If prescribed a topical prescription, apply this FIRST to any red/rashy/itchy areas - triamcinolone cream - you can use this twice per day for the next 1-2 weeks, then when needed after that.     Apply a moisturizer to entire body, even where you just put the prescription medicine. We recommend CeraVe cream, Cetaphil cream, Eucerin cream, Gold Bond or Vanicream. You will do this every single day. If you don t bathe every day we still recommend an application of body moisturizer.

## 2022-11-08 NOTE — PROGRESS NOTES
HPI:   Chief complaints: Bonny Raymundo is a pleasant 76 year old female who presents for evaluation of itching around the torso; mainly across her stomach and lower back. She has had this for awhile. The itch is intermittent and will last for a few minutes then resolve. She does not use moisturizers. 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 She does have decreased kidney functions but does not receive treatment for this.       PHYSICAL EXAM:    There were no vitals taken for this visit.  Skin exam performed as follows: Type 2 skin. Mood appropriate  Alert and Oriented X 3. Well developed, well nourished in no distress.  General appearance: Normal  Head including face: Normal  Eyes: conjunctiva and lids: Normal  Mouth: Lips, teeth, gums: Normal  Neck: Normal  Cardiovascular: Exam of peripheral vascular system by observation for swelling, varicosities, edema: Normal  Right upper extremity: Normal  Left upper extremity: Normal  Right lower extremity: Normal  Left lower extremity: Normal  Skin: Scalp and body hair: See below    Xerosis, excoriation and dermatitis on the lower back and abdomen; xerosis of arms and legs    ASSESSMENT/PLAN:     1. Atopic neurodermatitis, xerosis, skin pruritus; ? Uremic pruritis? - discussed diagnoses and treatment options. Will start with topical steroids and emollients; discussed NBUVB if struggling as she lives close to the clinic.   --Start TAC cream BID x 1-2 weeks PRN flares  --Thick emollients daily  --Shower daily          Follow-up: 4-6 months/PRN sooner  CC:   Scribed By: Karley Massey, MS, PA-C

## 2022-11-08 NOTE — PROGRESS NOTES
[facility]: Cancer Care                                                                                          Bonny called writer last Friday and requested a nutrition referral for weight loss.  Dr Spann is fine with referring her and referral placed.  Will send MyChart to pt with scheduling information.      Signature:  Sue Marquez RN

## 2022-11-09 ENCOUNTER — TELEPHONE (OUTPATIENT)
Dept: OPHTHALMOLOGY | Facility: CLINIC | Age: 77
End: 2022-11-09

## 2022-11-09 ENCOUNTER — LAB (OUTPATIENT)
Dept: INFUSION THERAPY | Facility: CLINIC | Age: 77
End: 2022-11-09
Attending: INTERNAL MEDICINE
Payer: COMMERCIAL

## 2022-11-09 VITALS
RESPIRATION RATE: 20 BRPM | SYSTOLIC BLOOD PRESSURE: 113 MMHG | TEMPERATURE: 98.4 F | OXYGEN SATURATION: 98 % | DIASTOLIC BLOOD PRESSURE: 69 MMHG | HEART RATE: 84 BPM

## 2022-11-09 DIAGNOSIS — R63.4 WEIGHT LOSS, UNINTENTIONAL: ICD-10-CM

## 2022-11-09 DIAGNOSIS — D61.3 IDIOPATHIC APLASTIC ANEMIA (H): ICD-10-CM

## 2022-11-09 DIAGNOSIS — N18.4 ANEMIA OF CHRONIC RENAL FAILURE, STAGE 4 (SEVERE) (H): Primary | ICD-10-CM

## 2022-11-09 DIAGNOSIS — N18.4 CKD (CHRONIC KIDNEY DISEASE) STAGE 4, GFR 15-29 ML/MIN (H): ICD-10-CM

## 2022-11-09 DIAGNOSIS — D63.1 ANEMIA OF CHRONIC RENAL FAILURE, STAGE 4 (SEVERE) (H): Primary | ICD-10-CM

## 2022-11-09 LAB
BASOPHILS # BLD MANUAL: 0.1 10E3/UL (ref 0–0.2)
BASOPHILS NFR BLD MANUAL: 2 %
BLASTS # BLD MANUAL: 0.1 10E3/UL
BLASTS NFR BLD MANUAL: 2 %
EOSINOPHIL # BLD MANUAL: 0 10E3/UL (ref 0–0.7)
EOSINOPHIL NFR BLD MANUAL: 0 %
ERYTHROCYTE [DISTWIDTH] IN BLOOD BY AUTOMATED COUNT: 20.3 % (ref 10–15)
HCT VFR BLD AUTO: 28.5 % (ref 35–47)
HGB BLD-MCNC: 8.2 G/DL (ref 11.7–15.7)
LYMPHOCYTES # BLD MANUAL: 1.1 10E3/UL (ref 0.8–5.3)
LYMPHOCYTES NFR BLD MANUAL: 41 %
MCH RBC QN AUTO: 31.3 PG (ref 26.5–33)
MCHC RBC AUTO-ENTMCNC: 28.8 G/DL (ref 31.5–36.5)
MCV RBC AUTO: 109 FL (ref 78–100)
MONOCYTES # BLD MANUAL: 0.4 10E3/UL (ref 0–1.3)
MONOCYTES NFR BLD MANUAL: 15 %
NEUTROPHILS # BLD MANUAL: 1.1 10E3/UL (ref 1.6–8.3)
NEUTROPHILS NFR BLD MANUAL: 40 %
PLAT MORPH BLD: ABNORMAL
PLATELET # BLD AUTO: 73 10E3/UL (ref 150–450)
RBC # BLD AUTO: 2.62 10E6/UL (ref 3.8–5.2)
RBC MORPH BLD: ABNORMAL
WBC # BLD AUTO: 2.7 10E3/UL (ref 4–11)

## 2022-11-09 PROCEDURE — 36415 COLL VENOUS BLD VENIPUNCTURE: CPT

## 2022-11-09 PROCEDURE — 96372 THER/PROPH/DIAG INJ SC/IM: CPT | Performed by: INTERNAL MEDICINE

## 2022-11-09 PROCEDURE — 85027 COMPLETE CBC AUTOMATED: CPT | Performed by: INTERNAL MEDICINE

## 2022-11-09 PROCEDURE — 85007 BL SMEAR W/DIFF WBC COUNT: CPT | Performed by: INTERNAL MEDICINE

## 2022-11-09 PROCEDURE — 250N000011 HC RX IP 250 OP 636: Performed by: INTERNAL MEDICINE

## 2022-11-09 RX ORDER — EPINEPHRINE 1 MG/ML
0.3 INJECTION, SOLUTION INTRAMUSCULAR; SUBCUTANEOUS EVERY 5 MIN PRN
Status: CANCELLED | OUTPATIENT
Start: 2022-11-09

## 2022-11-09 RX ORDER — METHYLPREDNISOLONE SODIUM SUCCINATE 125 MG/2ML
125 INJECTION, POWDER, LYOPHILIZED, FOR SOLUTION INTRAMUSCULAR; INTRAVENOUS
Status: CANCELLED
Start: 2022-11-09

## 2022-11-09 RX ORDER — MEPERIDINE HYDROCHLORIDE 25 MG/ML
25 INJECTION INTRAMUSCULAR; INTRAVENOUS; SUBCUTANEOUS EVERY 30 MIN PRN
Status: CANCELLED | OUTPATIENT
Start: 2022-11-09

## 2022-11-09 RX ORDER — DIPHENHYDRAMINE HYDROCHLORIDE 50 MG/ML
50 INJECTION INTRAMUSCULAR; INTRAVENOUS
Status: CANCELLED
Start: 2022-11-09

## 2022-11-09 RX ORDER — ALBUTEROL SULFATE 90 UG/1
1-2 AEROSOL, METERED RESPIRATORY (INHALATION)
Status: CANCELLED
Start: 2022-11-09

## 2022-11-09 RX ORDER — ALBUTEROL SULFATE 0.83 MG/ML
2.5 SOLUTION RESPIRATORY (INHALATION)
Status: CANCELLED | OUTPATIENT
Start: 2022-11-09

## 2022-11-09 RX ADMIN — DARBEPOETIN ALFA 300 MCG: 300 INJECTION, SOLUTION INTRAVENOUS; SUBCUTANEOUS at 14:08

## 2022-11-09 NOTE — TELEPHONE ENCOUNTER
Spoke to pt to move up follow-up with Dr. Padilla. Confirmed new date/time.     Aracelis Pradhan on 11/9/2022 at 4:49 PM

## 2022-11-09 NOTE — PROGRESS NOTES
Infusion Nursing Note:  Bonny Raymundo presents today for Aranesp.    Patient seen by provider today: No   present during visit today: Not Applicable.    Note: N/A.    Intravenous Access:  No Intravenous access/labs at this visit.    Treatment Conditions:  Lab Results   Component Value Date    HGB 8.2 (L) 11/09/2022    WBC 2.7 (L) 11/09/2022    ANEU 1.1 (L) 10/27/2022    ANEUTAUTO 1.3 (L) 09/12/2022    PLT 73 (L) 11/09/2022      Results reviewed, labs MET treatment parameters, ok to proceed with treatment.    Post Infusion Assessment:  Patient tolerated injection without incident.  Site patent and intact, free from redness, edema or discomfort.  No evidence of extravasations.     Discharge Plan:   Patient declined prescription refills.  Discharge instructions reviewed with: Patient.  Patient and/or family verbalized understanding of discharge instructions and all questions answered.  AVS to patient via [employer].  Patient will return 11/23 for next appointment.   Patient discharged in stable condition accompanied by: self.  Departure Mode: Ambulatory.      Rohith Bell RN

## 2022-11-09 NOTE — PROGRESS NOTES
Nursing Note:  Bonny Raymundo presents today for peripheral labs.    Patient seen by provider today: No   present during visit today: Not Applicable.    Note: N/A.    Intravenous Access:  Lab draw site L AC, Needle type butterfly, Gauge 23.  Labs drawn without difficulty.    Discharge Plan:   Patient was sent to infusion for aranesp appointment.    Kathy Corea RN

## 2022-11-14 ENCOUNTER — TELEPHONE (OUTPATIENT)
Dept: HEMATOLOGY | Facility: CLINIC | Age: 77
End: 2022-11-14

## 2022-11-14 DIAGNOSIS — D61.818 PANCYTOPENIA (H): Primary | ICD-10-CM

## 2022-11-14 NOTE — TELEPHONE ENCOUNTER
I called Bonny to discuss CBC results. She has had some blasts noted in the peripheral blood. This might be from the Aranesp, but concerning for worsening of bone marrow with possible MDS or leukemia. It has been 5 years since she had a BM Bx. Will schedule her for one sometime within the next few weeks. Keep other appts.  Minerva Spann MD  Hematology

## 2022-11-15 ENCOUNTER — PATIENT OUTREACH (OUTPATIENT)
Dept: ONCOLOGY | Facility: CLINIC | Age: 77
End: 2022-11-15

## 2022-11-15 NOTE — PROGRESS NOTES
[facility]: Cancer Care                                                                                          Spoke with patient.  Dr Spann spoke with her about having another BMBx done.  Writer explained process to patient and will send handout through [employer].  Pt is on Eliquis and understands that she will need to hold it the day before and the day of the procedure.  She also understands that she will need a  the day of the procedure.  Pt prefers to have the procedure done at [facility] and was informed that they are able to do that by SIMBA there in clinic.      Signature:  Sue Marquez RN

## 2022-11-17 ENCOUNTER — HOSPITAL ENCOUNTER (OUTPATIENT)
Dept: PHYSICAL THERAPY | Facility: CLINIC | Age: 77
Discharge: HOME | End: 2022-11-17
Payer: COMMERCIAL

## 2022-11-17 DIAGNOSIS — R26.89 BALANCE PROBLEMS: ICD-10-CM

## 2022-11-17 DIAGNOSIS — R53.81 PHYSICAL DECONDITIONING: Primary | ICD-10-CM

## 2022-11-17 PROCEDURE — 97110 THERAPEUTIC EXERCISES: CPT | Mod: GP | Performed by: PHYSICAL THERAPIST

## 2022-11-17 PROCEDURE — 97112 NEUROMUSCULAR REEDUCATION: CPT | Mod: GP | Performed by: PHYSICAL THERAPIST

## 2022-11-21 ENCOUNTER — HOSPITAL ENCOUNTER (OUTPATIENT)
Dept: PHYSICAL THERAPY | Facility: CLINIC | Age: 77
Discharge: HOME | End: 2022-11-21
Payer: COMMERCIAL

## 2022-11-21 DIAGNOSIS — R26.89 BALANCE PROBLEMS: ICD-10-CM

## 2022-11-21 DIAGNOSIS — M62.81 GENERALIZED MUSCLE WEAKNESS: Primary | ICD-10-CM

## 2022-11-21 PROCEDURE — 97112 NEUROMUSCULAR REEDUCATION: CPT | Mod: GP | Performed by: PHYSICAL THERAPIST

## 2022-11-21 PROCEDURE — 97110 THERAPEUTIC EXERCISES: CPT | Mod: GP | Performed by: PHYSICAL THERAPIST

## 2022-11-22 LAB
ABO/RH(D): NORMAL
ANTIBODY SCREEN: NEGATIVE
SPECIMEN EXPIRATION DATE: NORMAL

## 2022-11-23 ENCOUNTER — ALLIED HEALTH/NURSE VISIT (OUTPATIENT)
Dept: INFUSION THERAPY | Facility: CLINIC | Age: 77
End: 2022-11-23
Attending: INTERNAL MEDICINE
Payer: COMMERCIAL

## 2022-11-23 VITALS
HEART RATE: 99 BPM | TEMPERATURE: 98 F | OXYGEN SATURATION: 96 % | RESPIRATION RATE: 18 BRPM | DIASTOLIC BLOOD PRESSURE: 81 MMHG | SYSTOLIC BLOOD PRESSURE: 132 MMHG

## 2022-11-23 DIAGNOSIS — N18.4 CKD (CHRONIC KIDNEY DISEASE) STAGE 4, GFR 15-29 ML/MIN (H): ICD-10-CM

## 2022-11-23 DIAGNOSIS — D63.1 ANEMIA OF CHRONIC RENAL FAILURE, STAGE 4 (SEVERE) (H): Primary | ICD-10-CM

## 2022-11-23 DIAGNOSIS — D61.3 IDIOPATHIC APLASTIC ANEMIA (H): ICD-10-CM

## 2022-11-23 DIAGNOSIS — R63.4 WEIGHT LOSS, UNINTENTIONAL: ICD-10-CM

## 2022-11-23 DIAGNOSIS — N18.4 ANEMIA OF CHRONIC RENAL FAILURE, STAGE 4 (SEVERE) (H): Primary | ICD-10-CM

## 2022-11-23 LAB
BASOPHILS # BLD MANUAL: 0 10E3/UL (ref 0–0.2)
BASOPHILS NFR BLD MANUAL: 1 %
BLASTS # BLD MANUAL: 0 10E3/UL
BLASTS NFR BLD MANUAL: 1 %
EOSINOPHIL # BLD MANUAL: 0 10E3/UL (ref 0–0.7)
EOSINOPHIL NFR BLD MANUAL: 1 %
ERYTHROCYTE [DISTWIDTH] IN BLOOD BY AUTOMATED COUNT: 21.4 % (ref 10–15)
HCT VFR BLD AUTO: 23.6 % (ref 35–47)
HGB BLD-MCNC: 7 G/DL (ref 11.7–15.7)
LYMPHOCYTES # BLD MANUAL: 0.8 10E3/UL (ref 0.8–5.3)
LYMPHOCYTES NFR BLD MANUAL: 40 %
MCH RBC QN AUTO: 32.3 PG (ref 26.5–33)
MCHC RBC AUTO-ENTMCNC: 29.7 G/DL (ref 31.5–36.5)
MCV RBC AUTO: 109 FL (ref 78–100)
MONOCYTES # BLD MANUAL: 0.2 10E3/UL (ref 0–1.3)
MONOCYTES NFR BLD MANUAL: 10 %
NEUTROPHILS # BLD MANUAL: 0.9 10E3/UL (ref 1.6–8.3)
NEUTROPHILS NFR BLD MANUAL: 47 %
NRBC # BLD AUTO: 0.1 10E3/UL
NRBC BLD MANUAL-RTO: 5 %
PLAT MORPH BLD: ABNORMAL
PLATELET # BLD AUTO: 81 10E3/UL (ref 150–450)
RBC # BLD AUTO: 2.17 10E6/UL (ref 3.8–5.2)
RBC MORPH BLD: ABNORMAL
WBC # BLD AUTO: 2 10E3/UL (ref 4–11)

## 2022-11-23 PROCEDURE — 86850 RBC ANTIBODY SCREEN: CPT | Performed by: INTERNAL MEDICINE

## 2022-11-23 PROCEDURE — 250N000011 HC RX IP 250 OP 636: Performed by: INTERNAL MEDICINE

## 2022-11-23 PROCEDURE — 96372 THER/PROPH/DIAG INJ SC/IM: CPT | Performed by: INTERNAL MEDICINE

## 2022-11-23 PROCEDURE — 85027 COMPLETE CBC AUTOMATED: CPT | Performed by: INTERNAL MEDICINE

## 2022-11-23 PROCEDURE — 36415 COLL VENOUS BLD VENIPUNCTURE: CPT

## 2022-11-23 PROCEDURE — 85007 BL SMEAR W/DIFF WBC COUNT: CPT | Performed by: INTERNAL MEDICINE

## 2022-11-23 PROCEDURE — 86923 COMPATIBILITY TEST ELECTRIC: CPT | Performed by: INTERNAL MEDICINE

## 2022-11-23 PROCEDURE — 86901 BLOOD TYPING SEROLOGIC RH(D): CPT | Performed by: INTERNAL MEDICINE

## 2022-11-23 RX ORDER — METHYLPREDNISOLONE SODIUM SUCCINATE 125 MG/2ML
125 INJECTION, POWDER, LYOPHILIZED, FOR SOLUTION INTRAMUSCULAR; INTRAVENOUS
Status: CANCELLED
Start: 2022-11-23

## 2022-11-23 RX ORDER — EPINEPHRINE 1 MG/ML
0.3 INJECTION, SOLUTION INTRAMUSCULAR; SUBCUTANEOUS EVERY 5 MIN PRN
Status: CANCELLED | OUTPATIENT
Start: 2022-11-23

## 2022-11-23 RX ORDER — ALBUTEROL SULFATE 90 UG/1
1-2 AEROSOL, METERED RESPIRATORY (INHALATION)
Status: CANCELLED
Start: 2022-11-23

## 2022-11-23 RX ORDER — ALBUTEROL SULFATE 0.83 MG/ML
2.5 SOLUTION RESPIRATORY (INHALATION)
Status: CANCELLED | OUTPATIENT
Start: 2022-11-23

## 2022-11-23 RX ORDER — MEPERIDINE HYDROCHLORIDE 25 MG/ML
25 INJECTION INTRAMUSCULAR; INTRAVENOUS; SUBCUTANEOUS EVERY 30 MIN PRN
Status: CANCELLED | OUTPATIENT
Start: 2022-11-23

## 2022-11-23 RX ORDER — DIPHENHYDRAMINE HYDROCHLORIDE 50 MG/ML
50 INJECTION INTRAMUSCULAR; INTRAVENOUS
Status: CANCELLED
Start: 2022-11-23

## 2022-11-23 RX ADMIN — DARBEPOETIN ALFA 300 MCG: 300 INJECTION, SOLUTION INTRAVENOUS; SUBCUTANEOUS at 12:55

## 2022-11-23 ASSESSMENT — PAIN SCALES - GENERAL: PAINLEVEL: SEVERE PAIN (6)

## 2022-11-23 NOTE — PROGRESS NOTES
Infusion Nursing Note:  Bonny Raymundo presents today for aranesp.    Patient seen by provider today: No   present during visit today: Not Applicable.    Note: Patient reports ongoing low energy, fatigue and occasional dizziness. Chronic diarrhea managed with imodium and metamucil. No other new concerns today. Hgb noted at 7.0 today. Patient also meet parameters for PRBCs, active orders in place. Patient did not want to stay for an appointment to be made. Message sent to charge RN and  staff to add patient on for 1 unit PRBCs Friday or Saturday. Type and screen added to labs drawn earlier today, blood bank notified. Patient also refused to stay for remainder of lab results to come back, platelet level and differential still in process at time of discharge.    Intravenous Access:  No Intravenous access/labs at this visit.    Treatment Conditions:  11/23/22: Hgb 7.0   Results reviewed, labs MET treatment parameters for aranesp, ok to proceed with treatment.    Post Infusion Assessment:  Patient tolerated injection without incident.  Site patent and intact, free from redness, edema or discomfort.     Discharge Plan:   Patient discharged in stable condition accompanied by: self.  Departure Mode: Ambulatory.  Face to Face time: 20 minutes coordinating care.      Demetrice Connolly RN

## 2022-11-25 RX ORDER — HEPARIN SODIUM,PORCINE 10 UNIT/ML
5 VIAL (ML) INTRAVENOUS
Status: CANCELLED | OUTPATIENT
Start: 2022-11-25

## 2022-11-25 RX ORDER — HEPARIN SODIUM (PORCINE) LOCK FLUSH IV SOLN 100 UNIT/ML 100 UNIT/ML
5 SOLUTION INTRAVENOUS
Status: CANCELLED | OUTPATIENT
Start: 2022-11-25

## 2022-11-26 ENCOUNTER — INFUSION THERAPY VISIT (OUTPATIENT)
Dept: INFUSION THERAPY | Facility: CLINIC | Age: 77
End: 2022-11-26
Attending: INTERNAL MEDICINE
Payer: COMMERCIAL

## 2022-11-26 VITALS
DIASTOLIC BLOOD PRESSURE: 71 MMHG | RESPIRATION RATE: 16 BRPM | OXYGEN SATURATION: 99 % | SYSTOLIC BLOOD PRESSURE: 112 MMHG | HEART RATE: 71 BPM | TEMPERATURE: 97.8 F

## 2022-11-26 DIAGNOSIS — D61.9 APLASTIC ANEMIA (H): Primary | ICD-10-CM

## 2022-11-26 DIAGNOSIS — D61.818 PANCYTOPENIA (H): ICD-10-CM

## 2022-11-26 PROCEDURE — P9016 RBC LEUKOCYTES REDUCED: HCPCS | Performed by: INTERNAL MEDICINE

## 2022-11-26 PROCEDURE — 36430 TRANSFUSION BLD/BLD COMPNT: CPT

## 2022-11-26 RX ORDER — HEPARIN SODIUM (PORCINE) LOCK FLUSH IV SOLN 100 UNIT/ML 100 UNIT/ML
5 SOLUTION INTRAVENOUS
Status: DISCONTINUED | OUTPATIENT
Start: 2022-11-26 | End: 2022-11-26 | Stop reason: HOSPADM

## 2022-11-26 RX ORDER — HEPARIN SODIUM,PORCINE 10 UNIT/ML
5 VIAL (ML) INTRAVENOUS
Status: DISCONTINUED | OUTPATIENT
Start: 2022-11-26 | End: 2022-11-26 | Stop reason: HOSPADM

## 2022-11-26 NOTE — PROGRESS NOTES
Infusion Nursing Note:  Bonny Raymundo presents today for 1 unit PRBC.    Patient seen by provider today: No   present during visit today:   Not Applicable.  Note: N/A.    Intravenous Access:  Peripheral IV placed.    Treatment Conditions:  Lab Results   Component Value Date    HGB 7.0 (L) 11/23/2022    WBC 2.0 (L) 11/23/2022    ANEU 0.9 (L) 11/23/2022    ANEUTAUTO 1.3 (L) 09/12/2022    PLT 81 (L) 11/23/2022        Post Infusion Assessment:  Patient tolerated infusion without incident.  Blood return noted pre and post infusion.  Site patent and intact, free from redness, edema or discomfort.  No evidence of extravasations.  Access discontinued per protocol.     Discharge Plan:   Discharge instructions reviewed with: Patient.  Patient and/or family verbalized understanding of discharge instructions and all questions answered.  Patient discharged in stable condition accompanied by: self.  Departure Mode: Ambulatory.      Merry Bean RN

## 2022-11-28 ENCOUNTER — HOSPITAL ENCOUNTER (OUTPATIENT)
Dept: PHYSICAL THERAPY | Facility: CLINIC | Age: 77
Discharge: HOME | End: 2022-11-28
Payer: COMMERCIAL

## 2022-11-28 DIAGNOSIS — R26.89 BALANCE PROBLEMS: ICD-10-CM

## 2022-11-28 DIAGNOSIS — R53.81 PHYSICAL DECONDITIONING: Primary | ICD-10-CM

## 2022-11-28 PROCEDURE — 97112 NEUROMUSCULAR REEDUCATION: CPT | Mod: GP | Performed by: PHYSICAL THERAPIST

## 2022-11-28 PROCEDURE — 97110 THERAPEUTIC EXERCISES: CPT | Mod: GP | Performed by: PHYSICAL THERAPIST

## 2022-12-03 DIAGNOSIS — D61.818 PANCYTOPENIA (H): Primary | ICD-10-CM

## 2022-12-06 ENCOUNTER — PROCEDURE ONLY VISIT (OUTPATIENT)
Dept: ONCOLOGY | Facility: CLINIC | Age: 77
End: 2022-12-06
Attending: INTERNAL MEDICINE
Payer: COMMERCIAL

## 2022-12-06 ENCOUNTER — INFUSION THERAPY VISIT (OUTPATIENT)
Dept: INFUSION THERAPY | Facility: CLINIC | Age: 77
End: 2022-12-06
Attending: NURSE PRACTITIONER
Payer: COMMERCIAL

## 2022-12-06 VITALS
HEART RATE: 100 BPM | BODY MASS INDEX: 17.97 KG/M2 | RESPIRATION RATE: 20 BRPM | WEIGHT: 98.3 LBS | OXYGEN SATURATION: 96 % | DIASTOLIC BLOOD PRESSURE: 67 MMHG | SYSTOLIC BLOOD PRESSURE: 106 MMHG

## 2022-12-06 DIAGNOSIS — D61.818 PANCYTOPENIA (H): Primary | ICD-10-CM

## 2022-12-06 DIAGNOSIS — D61.9 APLASTIC ANEMIA (H): ICD-10-CM

## 2022-12-06 LAB
ABO/RH(D): NORMAL
ANTIBODY SCREEN: NEGATIVE
BASOPHILS # BLD MANUAL: 0 10E3/UL (ref 0–0.2)
BASOPHILS NFR BLD MANUAL: 1 %
EOSINOPHIL # BLD MANUAL: 0 10E3/UL (ref 0–0.7)
EOSINOPHIL NFR BLD MANUAL: 0 %
ERYTHROCYTE [DISTWIDTH] IN BLOOD BY AUTOMATED COUNT: 23.1 % (ref 10–15)
HCT VFR BLD AUTO: 27.2 % (ref 35–47)
HGB BLD-MCNC: 8.2 G/DL (ref 11.7–15.7)
LYMPHOCYTES # BLD MANUAL: 0.9 10E3/UL (ref 0.8–5.3)
LYMPHOCYTES NFR BLD MANUAL: 36 %
MCH RBC QN AUTO: 31.7 PG (ref 26.5–33)
MCHC RBC AUTO-ENTMCNC: 30.1 G/DL (ref 31.5–36.5)
MCV RBC AUTO: 105 FL (ref 78–100)
MONOCYTES # BLD MANUAL: 0.8 10E3/UL (ref 0–1.3)
MONOCYTES NFR BLD MANUAL: 33 %
NEUTROPHILS # BLD MANUAL: 0.7 10E3/UL (ref 1.6–8.3)
NEUTROPHILS NFR BLD MANUAL: 30 %
NRBC # BLD AUTO: 0 10E3/UL
NRBC BLD MANUAL-RTO: 2 %
PLAT MORPH BLD: ABNORMAL
PLATELET # BLD AUTO: 66 10E3/UL (ref 150–450)
RBC # BLD AUTO: 2.59 10E6/UL (ref 3.8–5.2)
RBC MORPH BLD: ABNORMAL
RETICS # AUTO: 0.07 10E6/UL (ref 0.03–0.1)
RETICS/RBC NFR AUTO: 2.6 % (ref 0.5–2)
SPECIMEN EXPIRATION DATE: NORMAL
WBC # BLD AUTO: 2.4 10E3/UL (ref 4–11)

## 2022-12-06 PROCEDURE — 86901 BLOOD TYPING SEROLOGIC RH(D): CPT | Performed by: INTERNAL MEDICINE

## 2022-12-06 PROCEDURE — 85007 BL SMEAR W/DIFF WBC COUNT: CPT | Performed by: NURSE PRACTITIONER

## 2022-12-06 PROCEDURE — 36415 COLL VENOUS BLD VENIPUNCTURE: CPT

## 2022-12-06 PROCEDURE — 86850 RBC ANTIBODY SCREEN: CPT | Performed by: INTERNAL MEDICINE

## 2022-12-06 PROCEDURE — 85027 COMPLETE CBC AUTOMATED: CPT | Performed by: NURSE PRACTITIONER

## 2022-12-06 PROCEDURE — 85045 AUTOMATED RETICULOCYTE COUNT: CPT | Performed by: NURSE PRACTITIONER

## 2022-12-06 RX ORDER — HEPARIN SODIUM (PORCINE) LOCK FLUSH IV SOLN 100 UNIT/ML 100 UNIT/ML
5 SOLUTION INTRAVENOUS
Status: CANCELLED | OUTPATIENT
Start: 2022-12-06

## 2022-12-06 RX ORDER — HEPARIN SODIUM,PORCINE 10 UNIT/ML
5 VIAL (ML) INTRAVENOUS
Status: CANCELLED | OUTPATIENT
Start: 2022-12-06

## 2022-12-06 NOTE — PROGRESS NOTES
Infusion Nursing Note:  Bonny Raymundo presents today for:Bone Marrow Biopsy.  Patient seen by provider today: Yes: aRkesh Craven NP   present during visit today: Not Applicable.    Note: Patient arrived to infusion reporting weakness this morning due to diarrhea.  Per patient, the diarrhea is not new for her and does resolve with Imodium. During medication reconciliation this morning, it was noted that patient did take her Eliquis this morning. Patient also reported to not remembering being educated on holding Eliquis or the need to have a  post procedure if given versed. This RN consulted with Rakesh Craven NP regarding patient's Eliquis use.     VORB: Rakesh Craven NP/ GALILEA Conner RN on 12/6/22 @0900:  - Hold bone marrow biopsy for today due to taking blood thinner this morning.  - Reschedule for next available time.    Patient updated on plan by Rakesh Craven NP. This RN reviewed bone marrow instructions with the patient including information on holding Eliquis for 2 days prior to biopsy and need for a  home from procedure. Patient's cbc resulted with a Hgb of 8.2 so does not meet criteria for transfusion today. Lorin asked to leave clinic today prior to recieivng new schedule of rescheduled bone marroe biopsdy. This RN will call patient at home with updated time. Patient verbalizes understanding.     Intravenous Access:  Peripheral IV placed.    Treatment Conditions:  Lab Results   Component Value Date    HGB 8.2 (L) 12/06/2022    WBC 2.4 (L) 12/06/2022    ANEU 0.7 (L) 12/06/2022    ANEUTAUTO 1.3 (L) 09/12/2022    PLT 66 (L) 12/06/2022        Post Infusion Assessment:  No evidence of extravasations.  Access discontinued per protocol.     Discharge Plan:   Patient declined prescription refills.  Discharge instructions reviewed with: Patient.  Patient and/or family verbalized understanding of discharge instructions and all questions answered.  AVS to patient via [employer].  Patient will return 12/7/22  with Yeni Hartman CNP for next appointment.   Patient discharged in stable condition accompanied by: self.  Departure Mode: Ambulatory.      Sue Conner RN        Addendum:  After patient discharged from clinic, this RN attempted to call patient on her cell phone number to update her on newly scheduled bone marrow biopsy scheduled for 12/15/22 at 0800.  Patient did not answer so voice mail left on her phone updating her of the new appointment date and time and reminding patient to hold on her Eliquis prior to procedure.

## 2022-12-07 ENCOUNTER — VIRTUAL VISIT (OUTPATIENT)
Dept: ONCOLOGY | Facility: CLINIC | Age: 77
End: 2022-12-07
Attending: REGISTERED NURSE
Payer: COMMERCIAL

## 2022-12-07 DIAGNOSIS — D61.818 PANCYTOPENIA (H): ICD-10-CM

## 2022-12-07 DIAGNOSIS — D61.9 APLASTIC ANEMIA (H): Primary | ICD-10-CM

## 2022-12-07 DIAGNOSIS — N18.4 ANEMIA OF CHRONIC RENAL FAILURE, STAGE 4 (SEVERE) (H): ICD-10-CM

## 2022-12-07 DIAGNOSIS — D63.1 ANEMIA OF CHRONIC RENAL FAILURE, STAGE 4 (SEVERE) (H): ICD-10-CM

## 2022-12-07 DIAGNOSIS — R63.4 WEIGHT LOSS, UNINTENTIONAL: ICD-10-CM

## 2022-12-07 PROCEDURE — 99214 OFFICE O/P EST MOD 30 MIN: CPT | Mod: 95 | Performed by: REGISTERED NURSE

## 2022-12-07 NOTE — PROGRESS NOTES
Bonny is a 76 year old who is being evaluated via a billable telephone visit.      Patient stated she is in the state of [place] for the visit today.    What phone number would you like to be contacted at? [phone]  How would you like to obtain your AVS? Angelito Ceballos, Virtual Visit Facilitator    Phone call duration: 10 minutes      Hematology clinic visit  In person visit     Problem list:  - Aplastic anemia-diagnosed 9/26/2016, repeat bone marrow biopsy 12/22/16- bone marrow <5% cellular with no dysplasia.  Normal cytogenetics. PNH negative. 1/9/ 2017-treated with ATG, methylprednisolone, cyclosporine, and eltrombopag.  Cyclosporine discontinued May 2021, eltrombopag discontinued June 2021 due to abnormal liver function test.    Partial remission with the immunotherapy, which is gradually weaned.  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  - Shingles right face  2022     Interval history:  is being interviewed by phone to follow-up with her aplastic anemia. She had an in-person appointment scheduled, but transitioned to a phone visit due to not wanting to drive in winter weather today. She is currently receiving aranesp 300 mcg every 2 weeks (dose increased from 100 mcg on 10/27/22).  Dr. Spann recommended a bone marrow biopsy - this was scheduled yesterday, but unfortunately she forgot to hold her Eliquis, so it was re-scheduled for next week (12/15).    Hosted 5 people for Thanksgiving, feels like she is still recovering from an energy  "standpoint.    Diarrhea is \"crippling me\" because days are planned around whether or not she is going to have diarrhea.  Colonoscopy is scheduled in January.  Stool studies were negative in October.    Feels like she is eating \"a little better\" but also notes meals are still limited to about 1/2 a sandwich. Weight is down 25 lb from this time last year, currently stable/2-3lb gain since August.    She continues on apixaban.  No epistaxis, melena, hematochezia, easy bruising.       PHYSICAL EXAMINATION:    Objective:  General: patient sounds in no audible acute distress, alert and oriented, speech clear and fluid  Resp: Speaking in full sentences, no audible respiratory distress, no cough, no audible wheeze  Psych: able to articulate logical thoughts, able to abstract reason, no tangential thoughts, no hallucinations or delusions.  Affect is normal    The rest of a comprehensive physical examination is deferred due to PHE (public health emergency) phone restrictions        Labs:  I personally reviewed the following labs:    Most Recent 3 CBC's:Recent Labs   Lab Test 12/06/22  0844 11/23/22  1116 11/09/22  1312   WBC 2.4* 2.0* 2.7*   HGB 8.2* 7.0* 8.2*   * 109* 109*   PLT 66* 81* 73*     Most Recent 3 BMP's:  Recent Labs   Lab Test 10/05/22  1017 08/22/22  1336 07/26/22  1123    141 139   POTASSIUM 3.6 3.7 4.0   CHLORIDE 103 109 107   CO2 24 25 27   BUN 22.9 20 22   CR 1.19* 1.13* 1.15*   ANIONGAP 12 7 5   LEO 8.8 8.8 8.4*   * 107* 110*     Most Recent 2 LFT's:  Recent Labs   Lab Test 08/22/22  1336 07/26/22  1123   AST 14 18   ALT 11 14   ALKPHOS 87 100   BILITOTAL 0.4 0.5       Assessment and Recommendation:  #Aplastic anemia-  severe anemia with mild neutropenia and thrombocytopenia  - erythropoietin level elevated, but < 500, iron studies , copper-normal, PNH-negative, SPEP-no monoclonal protein, kappa/lambda-both elevated with normal ratio, probably related to renal failure, TSH-normal.  " Rechecked B12 in October, above normal limits. She might eventually need routine Red cell transfusions.  Suboptimal response to aranesp.   - Bone marrow biopsy next week  - Continue Aranesp to 300 mcg subcu every 2 weeks       #History of recurrent unprovoked DVT and PE-continue  apixaban 2.5 mg twice daily     #Unexplained weight loss-stable, no splenomegaly to cause early satiety, colonoscopy scheduled in January     #Chronic kidney disease-  her primary care physician placed the referral, has not seen nephrology yet.     #Unsteadiness-she should talk to her PCP about physical therapy.    RTC with Dr. Spann in 2 months    34 minutes spent on the date of the encounter doing chart review, review of test results, patient visit and documentation     KHADRA Salvador CNP  [facility]  [address] [place] [id number] [phone]

## 2022-12-07 NOTE — LETTER
12/7/2022         RE: Bonny Raymundo  [address] [facility] [address]        Dear Colleague,    Thank you for referring your patient, Bonny Raymundo, to the [facility]. Please see a copy of my visit note below.    Bonny is a 76 year old who is being evaluated via a billable telephone visit.      Patient stated she is in the state of [place] for the visit today.    What phone number would you like to be contacted at? [phone]  How would you like to obtain your AVS? Angelito Ceballos, Virtual Visit Facilitator    Phone call duration: 10 minutes      Hematology clinic visit  In person visit     Problem list:  - Aplastic anemia-diagnosed 9/26/2016, repeat bone marrow biopsy 12/22/16- bone marrow <5% cellular with no dysplasia.  Normal cytogenetics. PNH negative. 1/9/ 2017-treated with ATG, methylprednisolone, cyclosporine, and eltrombopag.  Cyclosporine discontinued May 2021, eltrombopag discontinued June 2021 due to abnormal liver function test.    Partial remission with the immunotherapy, which is gradually weaned.  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  - Shingles right face  2022     Interval history:  is being interviewed by phone to follow-up with her aplastic anemia. She had an in-person appointment scheduled, but transitioned to a phone visit due to not wanting to drive in winter weather today. She is currently receiving aranesp 300 mcg every 2 weeks (dose increased from 100 mcg on 10/27/22).  Dr. Spann  "recommended a bone marrow biopsy - this was scheduled yesterday, but unfortunately she forgot to hold her Eliquis, so it was re-scheduled for next week (12/15).    Hosted 5 people for Thanksgiving, feels like she is still recovering from an energy standpoint.    Diarrhea is \"crippling me\" because days are planned around whether or not she is going to have diarrhea.  Colonoscopy is scheduled in January.  Stool studies were negative in October.    Feels like she is eating \"a little better\" but also notes meals are still limited to about 1/2 a sandwich. Weight is down 25 lb from this time last year, currently stable/2-3lb gain since August.    She continues on apixaban.  No epistaxis, melena, hematochezia, easy bruising.       PHYSICAL EXAMINATION:    Objective:  General: patient sounds in no audible acute distress, alert and oriented, speech clear and fluid  Resp: Speaking in full sentences, no audible respiratory distress, no cough, no audible wheeze  Psych: able to articulate logical thoughts, able to abstract reason, no tangential thoughts, no hallucinations or delusions.  Affect is normal    The rest of a comprehensive physical examination is deferred due to PHE (public health emergency) phone restrictions        Labs:  I personally reviewed the following labs:    Most Recent 3 CBC's:Recent Labs   Lab Test 12/06/22  0844 11/23/22  1116 11/09/22  1312   WBC 2.4* 2.0* 2.7*   HGB 8.2* 7.0* 8.2*   * 109* 109*   PLT 66* 81* 73*     Most Recent 3 BMP's:  Recent Labs   Lab Test 10/05/22  1017 08/22/22  1336 07/26/22  1123    141 139   POTASSIUM 3.6 3.7 4.0   CHLORIDE 103 109 107   CO2 24 25 27   BUN 22.9 20 22   CR 1.19* 1.13* 1.15*   ANIONGAP 12 7 5   LEO 8.8 8.8 8.4*   * 107* 110*     Most Recent 2 LFT's:  Recent Labs   Lab Test 08/22/22  1336 07/26/22  1123   AST 14 18   ALT 11 14   ALKPHOS 87 100   BILITOTAL 0.4 0.5       Assessment and Recommendation:  #Aplastic anemia-  severe anemia with mild " neutropenia and thrombocytopenia  - erythropoietin level elevated, but < 500, iron studies , copper-normal, PNH-negative, SPEP-no monoclonal protein, kappa/lambda-both elevated with normal ratio, probably related to renal failure, TSH-normal.  Rechecked B12 in October, above normal limits. She might eventually need routine Red cell transfusions.  Suboptimal response to aranesp.   - Bone marrow biopsy next week  - Continue Aranesp to 300 mcg subcu every 2 weeks       #History of recurrent unprovoked DVT and PE-continue  apixaban 2.5 mg twice daily     #Unexplained weight loss-stable, no splenomegaly to cause early satiety, colonoscopy scheduled in January     #Chronic kidney disease-  her primary care physician placed the referral, has not seen nephrology yet.     #Unsteadiness-she should talk to her PCP about physical therapy.    RTC with Dr. Spann in 2 months    34 minutes spent on the date of the encounter doing chart review, review of test results, patient visit and documentation     KHADRA Salvador CNP  [facility]  [address], [place] [id number] [phone]

## 2022-12-14 DIAGNOSIS — D61.818 PANCYTOPENIA (H): Primary | ICD-10-CM

## 2022-12-15 ENCOUNTER — PROCEDURE ONLY VISIT (OUTPATIENT)
Dept: ONCOLOGY | Facility: CLINIC | Age: 77
End: 2022-12-15
Attending: INTERNAL MEDICINE
Payer: COMMERCIAL

## 2022-12-15 ENCOUNTER — INFUSION THERAPY VISIT (OUTPATIENT)
Dept: INFUSION THERAPY | Facility: CLINIC | Age: 77
End: 2022-12-15
Attending: NURSE PRACTITIONER
Payer: COMMERCIAL

## 2022-12-15 VITALS
TEMPERATURE: 98.6 F | HEART RATE: 78 BPM | DIASTOLIC BLOOD PRESSURE: 80 MMHG | OXYGEN SATURATION: 96 % | SYSTOLIC BLOOD PRESSURE: 124 MMHG | RESPIRATION RATE: 20 BRPM

## 2022-12-15 DIAGNOSIS — D61.818 PANCYTOPENIA (H): Primary | ICD-10-CM

## 2022-12-15 DIAGNOSIS — D61.9 APLASTIC ANEMIA (H): ICD-10-CM

## 2022-12-15 DIAGNOSIS — D61.818 PANCYTOPENIA (H): ICD-10-CM

## 2022-12-15 LAB
ABO/RH(D): NORMAL
ANTIBODY SCREEN: NEGATIVE
BASO STIPL BLD QL SMEAR: PRESENT
BASOPHILS # BLD MANUAL: 0 10E3/UL (ref 0–0.2)
BASOPHILS NFR BLD MANUAL: 0 %
BLD PROD TYP BPU: NORMAL
BLOOD COMPONENT TYPE: NORMAL
CODING SYSTEM: NORMAL
CROSSMATCH: NORMAL
EOSINOPHIL # BLD MANUAL: 0 10E3/UL (ref 0–0.7)
EOSINOPHIL NFR BLD MANUAL: 0 %
ERYTHROCYTE [DISTWIDTH] IN BLOOD BY AUTOMATED COUNT: 23.6 % (ref 10–15)
HCT VFR BLD AUTO: 24.2 % (ref 35–47)
HGB BLD-MCNC: 7.2 G/DL (ref 11.7–15.7)
INTERPRETATION: NORMAL
ISSUE DATE AND TIME: NORMAL
LAB DIRECTOR COMMENTS: NORMAL
LAB DIRECTOR DISCLAIMER: NORMAL
LAB DIRECTOR INTERPRETATION: NORMAL
LAB DIRECTOR METHODOLOGY: NORMAL
LAB DIRECTOR RESULTS: NORMAL
LYMPHOCYTES # BLD MANUAL: 0.6 10E3/UL (ref 0.8–5.3)
LYMPHOCYTES NFR BLD MANUAL: 31 %
MCH RBC QN AUTO: 30.8 PG (ref 26.5–33)
MCHC RBC AUTO-ENTMCNC: 29.8 G/DL (ref 31.5–36.5)
MCV RBC AUTO: 103 FL (ref 78–100)
MONOCYTES # BLD MANUAL: 0.4 10E3/UL (ref 0–1.3)
MONOCYTES NFR BLD MANUAL: 20 %
NEUTROPHILS # BLD MANUAL: 1 10E3/UL (ref 1.6–8.3)
NEUTROPHILS NFR BLD MANUAL: 49 %
PLAT MORPH BLD: ABNORMAL
PLATELET # BLD AUTO: 59 10E3/UL (ref 150–450)
RBC # BLD AUTO: 2.34 10E6/UL (ref 3.8–5.2)
RBC MORPH BLD: ABNORMAL
RETICS # AUTO: 0.04 10E6/UL (ref 0.03–0.1)
RETICS/RBC NFR AUTO: 1.9 % (ref 0.5–2)
SIGNIFICANT RESULTS: NORMAL
SPECIMEN DESCRIPTION: NORMAL
SPECIMEN DESCRIPTION: NORMAL
SPECIMEN EXPIRATION DATE: NORMAL
TARGETS BLD QL SMEAR: SLIGHT
TEST DETAILS, MDL: NORMAL
UNIT ABO/RH: NORMAL
UNIT NUMBER: NORMAL
UNIT STATUS: NORMAL
UNIT TYPE ISBT: 6200
WBC # BLD AUTO: 2 10E3/UL (ref 4–11)

## 2022-12-15 PROCEDURE — 85097 BONE MARROW INTERPRETATION: CPT | Performed by: PATHOLOGY

## 2022-12-15 PROCEDURE — 86901 BLOOD TYPING SEROLOGIC RH(D): CPT | Performed by: NURSE PRACTITIONER

## 2022-12-15 PROCEDURE — 88311 DECALCIFY TISSUE: CPT | Mod: 26 | Performed by: PATHOLOGY

## 2022-12-15 PROCEDURE — 86923 COMPATIBILITY TEST ELECTRIC: CPT | Performed by: INTERNAL MEDICINE

## 2022-12-15 PROCEDURE — 88313 SPECIAL STAINS GROUP 2: CPT | Mod: 26 | Performed by: PATHOLOGY

## 2022-12-15 PROCEDURE — 38222 DX BONE MARROW BX & ASPIR: CPT | Performed by: NURSE PRACTITIONER

## 2022-12-15 PROCEDURE — G0452 MOLECULAR PATHOLOGY INTERPR: HCPCS | Mod: 26 | Performed by: PATHOLOGY

## 2022-12-15 PROCEDURE — 88185 FLOWCYTOMETRY/TC ADD-ON: CPT | Performed by: INTERNAL MEDICINE

## 2022-12-15 PROCEDURE — G0452 MOLECULAR PATHOLOGY INTERPR: HCPCS | Mod: 26 | Performed by: STUDENT IN AN ORGANIZED HEALTH CARE EDUCATION/TRAINING PROGRAM

## 2022-12-15 PROCEDURE — 88264 CHROMOSOME ANALYSIS 20-25: CPT | Performed by: INTERNAL MEDICINE

## 2022-12-15 PROCEDURE — 88184 FLOWCYTOMETRY/ TC 1 MARKER: CPT | Performed by: INTERNAL MEDICINE

## 2022-12-15 PROCEDURE — 88342 IMHCHEM/IMCYTCHM 1ST ANTB: CPT | Mod: 26 | Performed by: PATHOLOGY

## 2022-12-15 PROCEDURE — 86850 RBC ANTIBODY SCREEN: CPT | Performed by: NURSE PRACTITIONER

## 2022-12-15 PROCEDURE — 81450 HL NEO GSAP 5-50DNA/DNA&RNA: CPT | Performed by: INTERNAL MEDICINE

## 2022-12-15 PROCEDURE — 88291 CYTO/MOLECULAR REPORT: CPT | Performed by: MEDICAL GENETICS

## 2022-12-15 PROCEDURE — 85045 AUTOMATED RETICULOCYTE COUNT: CPT | Performed by: NURSE PRACTITIONER

## 2022-12-15 PROCEDURE — 36415 COLL VENOUS BLD VENIPUNCTURE: CPT

## 2022-12-15 PROCEDURE — 88305 TISSUE EXAM BY PATHOLOGIST: CPT | Mod: 26 | Performed by: PATHOLOGY

## 2022-12-15 PROCEDURE — 88305 TISSUE EXAM BY PATHOLOGIST: CPT | Mod: TC | Performed by: INTERNAL MEDICINE

## 2022-12-15 PROCEDURE — 88189 FLOWCYTOMETRY/READ 16 & >: CPT | Mod: GC | Performed by: STUDENT IN AN ORGANIZED HEALTH CARE EDUCATION/TRAINING PROGRAM

## 2022-12-15 PROCEDURE — 85007 BL SMEAR W/DIFF WBC COUNT: CPT | Performed by: NURSE PRACTITIONER

## 2022-12-15 PROCEDURE — 85027 COMPLETE CBC AUTOMATED: CPT | Performed by: NURSE PRACTITIONER

## 2022-12-15 RX ORDER — HEPARIN SODIUM (PORCINE) LOCK FLUSH IV SOLN 100 UNIT/ML 100 UNIT/ML
5 SOLUTION INTRAVENOUS
Status: CANCELLED | OUTPATIENT
Start: 2022-01-01

## 2022-12-15 RX ORDER — HEPARIN SODIUM,PORCINE 10 UNIT/ML
5 VIAL (ML) INTRAVENOUS
Status: CANCELLED | OUTPATIENT
Start: 2022-01-01

## 2022-12-15 NOTE — PROGRESS NOTES
BMT ONC Adult Bone Marrow Biopsy Procedure Note  December 15, 2022    DIAGNOSIS: Pancytopenia  PROCEDURE: Unilateral Bone Marrow Biopsy and Aspiration    LOCATION: [facility]    Patient s identification was positively verified by verbal identification and invasive procedure safety checklist was completed. Informed consent was obtained. patient was placed in the prone position and prepped and draped in a sterile manner. Approximately 10 cc of 1% Lidocaine was used over the left posterior iliac spine. Following this a 3 mm incision was made. Trephine bone marrow core(s) was (were) obtained from the [facility]. Bone marrow aspirates were obtained from the [facility]. Aspirates were sent for morphology, immunophenotyping and cytogenetics. A total of approximately 19 ml of marrow was aspirated. Following this procedure a sterile dressing was applied to the bone marrow biopsy site(s). The patient was placed in the supine position to maintain pressure on the biopsy site. Post-procedure wound care instructions were given.     Complications: NO    Pre-procedural pain: 0 out of 10 on the numeric pain rating scale.     Procedural pain: 1 out of 10 on the numeric pain rating scale.     Post-procedural pain assessment: 1 out of 10 on the numeric pain rating scale.     Interventions: NO    Length of procedure:20 minutes or less      Procedure performed by: KHADRA Cramer CNP

## 2022-12-15 NOTE — PROGRESS NOTES
Infusion Nursing Note:  Bonny Raymundo presents today for Bone Marrow Biopsy.    Patient seen by provider today: Yes: Rakesh   present during visit today: Not Applicable.    Note: Patient did not bring , so patient opted out of Versed. BMBx site WNL. Patient states she feels well. Per Rakesh, will transfuse 1 unit PRBC without hemoglobin recheck on 12/18. Released prepare order.    Intravenous Access:  Labs drawn without difficulty.  Peripheral IV placed.    Treatment Conditions:  Lab Results   Component Value Date    HGB 7.2 (L) 12/15/2022    WBC 2.0 (L) 12/15/2022    ANEU 1.0 (L) 12/15/2022    ANEUTAUTO 1.3 (L) 09/12/2022    PLT 59 (L) 12/15/2022      Results reviewed, labs MET treatment parameters, ok to proceed with treatment.    Post Infusion Assessment:  No evidence of extravasations.  Access discontinued per protocol.     Discharge Plan:   Patient declined prescription refills.  Discharge instructions reviewed with: Patient.  Patient and/or family verbalized understanding of discharge instructions and all questions answered.  AVS to patient via MobiTXT.  Patient will return 12/18 for next appointment.   Patient discharged in stable condition accompanied by: self.  Departure Mode: Ambulatory.      Rohith Bell RN

## 2022-12-18 NOTE — PROGRESS NOTES
Infusion Nursing Note:  Bonny Raymundo presents today for 1 unit PRBC, aranesp.    Patient seen by provider today: No   present during visit today: Not Applicable.    Note: Patient reports low energy, fatigue and occasional dizziness related to low hgb. No other new concerns today.    Intravenous Access:  Peripheral IV placed.    Treatment Conditions:  Lab Results   Component Value Date    HGB 7.2 (L) 12/15/2022    WBC 2.0 (L) 12/15/2022    ANEU 1.0 (L) 12/15/2022    ANEUTAUTO 1.3 (L) 09/12/2022    PLT 59 (L) 12/15/2022      Results reviewed, labs MET treatment parameters for aranesp, ok to proceed with treatment.  Results reviewed, labs did NOT meet treatment parameters for PRBCs, ok to transfuse today per Rakesh Craven NP in orders.  Blood transfusion consent signed 10/5/22.    Post Infusion Assessment:  Patient tolerated transfusion without incident.  Patient tolerated injection without incident.  Blood return noted pre and post infusion.  Site patent and intact, free from redness, edema or discomfort.  No evidence of extravasations.  Access discontinued per protocol.     Discharge Plan:   Discharge instructions reviewed with: Patient.  Patient and/or family verbalized understanding of discharge instructions and all questions answered.  AVS to patient via [employer].  Patient will return to [facility] 12/22 for clinic visit with Dr. Spann.   Patient discharged in stable condition accompanied by: self.  Departure Mode: Ambulatory.      Demetrice Connolly RN

## 2022-12-20 NOTE — TELEPHONE ENCOUNTER
Received call from patient. States that she was on the phone earlier and an appointment for tomorrow at 10:30 for a pre-op with Dr. Cash was cancelled. Pt states she did not want this appointment cancelled. Appointment no longer available. Procedure is 01/04/2023.    Ok to take virtual slot tomorrow for this pre-op? Or any other virtual or williams day?    Can we leave a detailed message on this number? YES  Phone number patient can be reached at: Home number on file [phone] (home)    Ki Cruz RN  [facility] Triage

## 2022-12-20 NOTE — TELEPHONE ENCOUNTER
Called patient to relay PCP message. Per PCP approval, patient double booked for Pre-Op examination on 12/21/2022 at 10:30 am with PCP.     Patient had no additional questions at this time.

## 2022-12-20 NOTE — TELEPHONE ENCOUNTER
Reason for Call:  Same Day Appointment, Requested Provider:  Shelli Cash    PCP: Shelli Cash    Reason for visit: Pre op for surgery on 1/5/22    Duration of symptoms: n/a    Have you been treated for this in the past? No    Additional comments: Patient's time slot was marked for reschedule. Please assist patient in scheduling in time for procedure.    Can we leave a detailed message on this number? Unknown    Phone number patient can be reached at: Home number on file [phone] (home)    Best Time: Unknown    Call taken on 12/20/2022 at 8:31 AM by Marky Hollis

## 2022-12-20 NOTE — Clinical Note
5/22/2019       RE: Bonny Raymundo  [address]  [facility] [address]     Dear Colleague,    Thank you for referring your patient, Bonny Raymundo, to the [facility]. Please see a copy of my visit note below.    No notes on file    Again, thank you for allowing me to participate in the care of your patient.      Sincerely,    Celine Walls PA-C    
Wound manager continues to leak so received order from Dr. Costa to place wound vac with isolation of fistula. Periwound area protected with stoma powder and Cavilon, also applied hydrocolloids over skin for protection. Clear drape applied over area of abdomen larger than wound also for protection. Hole cut into granufoam for fistula crown, silicone contact barrier applied to wound bed with hole cut out for fistula. Wound vac at 75 mm/Hg negative pressure, 1 3/4" Heriberto 2 piece appliance placed over hole in granufoam for fistula drainage. 
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 Costa for persistence drainage from abdominal wound. Pt afebrile, HDS. Abdomen with large midline wound with exposed erythematous subcutaneous tissue with drainage of yellow serous fluid, circumferential erythema/cellulitis around wound. Labs significant for WBC 10.99, Hb 16.5, unremarkable chemistry panel, and ALT 50. CTAP with PO/IV contrast demonstrates large midline abdominal wall muscular defect, likely from recent open abdominal surgery. Small bowel containing left anterolateral spigelian type hernia without evidence of strangulation. Periwound protected with skin barrier wipes and stoma paste, stoma powder applied to wound then Aquacel hydrofiber placed in area, this all covered with hydrocolloid. Stoma ring applied around fistula in wound bed and [employer] wound manager #[id number] placed over entire site. 
Per patient, wound vac dressing leaked over the weekend so he removed it and has been applying ABD pad with powder over site. Less erythema than on Friday, stoma powder applied over eroded area near fistula as well as periwound and then sealed in with CAvilon barrier wipes. Multiple stoma rings applied around periwound to build area up and also to protect skin. Adapt convex barrier ring applied around fistula, hydrocolloids placed over denuded area. Lindsey wound/fistula pouch #[id number] applied around fistula, encouraged patient to splint abdomen when coughing or laughing. Abdominal binder applied around abdomen/fistula pouch.
Wound/fistula manager intact, pt to be discharged today. Pt states he is able to do dressing change with the assistance of his spouse declined home care.  Extra supplies given to patient for discharge, list of Rx to be given to him ordering supplies, education provided on where to order supplies. Reviewed how to place appliance, pt verbalized understanding.

## 2022-12-21 NOTE — H&P (VIEW-ONLY)
[address], [address]  [facility] [address]  Phone: [phone]  Primary Provider: Shelli Cash  Pre-op Performing Provider: NOVA COOPER      PREOPERATIVE EVALUATION:  Today's date: 12/21/2022    Bonny Raymundo is a 77 year old female who presents for a preoperative evaluation.    Surgical Information:  Surgery/Procedure: ESOPHAGOGASTRODUODENOSCOPY (EGD), COLONOSCOPY  Surgery Location: [facility]  Surgeon: Sean Dawson MD  Surgery Date: 1/4/2023  Time of Surgery: 1:00pm  Where patient plans to recover: At home with family  Fax number for surgical facility: Note does not need to be faxed, will be available electronically in Epic.    Type of Anesthesia Anticipated: MAC    Assessment & Plan     The proposed surgical procedure is considered LOW risk.    (Z01.818) Preop general physical exam  (primary encounter diagnosis)  Comment: she is transfusion dependent due to chronic/worsening anemia  Plan: cleared for surgery, will need to have labs drawn next week which are ordered, platelets have been low but stable in the 50s-80s    (R19.7) Diarrhea, unspecified type  Comment: daily, on imodium  Plan: colonoscopy     (D61.9) Aplastic anemia (H)  Comment: follows with Dr. Spann, on aranesp   Plan: labs next week    (N18.4) CKD (chronic kidney disease) stage 4, GFR 15-29 ml/min (H)  Comment: baseline GFR 40-50s  Plan: BASIC METABOLIC PANEL  Avoid nephrotoxins     (J44.9) Chronic obstructive pulmonary disease, unspecified COPD type (H)  Comment: stable  Plan:     (I82.402) Recurrent acute deep vein thrombosis (DVT) of left lower extremity (H)  Comment: on eliquis  Plan: w/renal function will have her skip evening dose on 1/2/23, skip both doses on 1/3/23 and am dose before surgery, resume when surgeon allows  We discussed today     Risks and Recommendations:  The patient has the following additional risks and recommendations for  perioperative complications:   - Consult Hospitalist / IM to assist with post-op medical management    Medication Instructions:  Don't take eliquis the evening of 1/2/22, don't take both doses on 1/3/22 and the morning of surgery (1/4/22).  Resume after procedure when your surgeon allows.    Take imodium and tylenol with a sip of water the am of surgery.    Take the rest of your medications as usual the night before surgery.    RECOMMENDATION:  APPROVAL GIVEN to proceed with proposed procedure, without further diagnostic evaluation.    Jade Angela PA-C  45 minutes on the day of the encounter doing chart review, history and exam, documentation and further activities as noted above.      Subjective     HPI related to upcoming procedure: Dmitriy is here for a pre-op for colonoscopy.  She has history of aplastic anemia.  She has had several blood transfusions this year.  She gets labs drawn about every two weeks, she will be due for labs next week.    She is able to walk up a flight of stairs without chest pain or significant dyspnea.    She is on eliquis for DVT.    Health Care Directive:  Patient does not have a Health Care Directive or Living Will: Patient states has Advance Directive and will bring in a copy to clinic.    Review of Systems  CONSTITUTIONAL: NEGATIVE for fever, chills, change in weight  INTEGUMENTARY/SKIN: NEGATIVE for worrisome rashes, moles or lesions  EYES: POSITIVE for right eye vision changes NEGATIVE for eye irritation  ENT/MOUTH: NEGATIVE for ear, mouth and throat problems  RESP: NEGATIVE for significant cough or SOB  CV: NEGATIVE for chest pain, palpitations or peripheral edema  GI: NEGATIVE for nausea, abdominal pain, heartburn, or change in bowel habits  : NEGATIVE for frequency, dysuria, or hematuria  MUSCULOSKELETAL: NEGATIVE for significant arthralgias or myalgia  NEURO: NEGATIVE for weakness, dizziness or paresthesias  HEME: NEGATIVE for bleeding problems    Patient Active  Problem List    Diagnosis Date Noted     Osteoporosis      Priority: High     Problem list name updated by automated process. Provider to review       Other neutropenia (H) 10/27/2022     Priority: Medium     High priority for 2019-nCoV vaccine 10/13/2022     Priority: Medium     Need for prophylactic vaccination and inoculation against influenza 10/13/2022     Priority: Medium     Post herpetic neuralgia 10/12/2022     Priority: Medium     Intertrigo labialis 10/12/2022     Priority: Medium     Itching 10/12/2022     Priority: Medium     Urinary problem 10/05/2022     Priority: Medium     Intertriginous candidiasis 10/05/2022     Priority: Medium     Cellulitis, unspecified cellulitis site 10/05/2022     Priority: Medium     Vitamin D deficiency 10/05/2022     Priority: Medium     Loss of balance 10/05/2022     Priority: Medium     Anemia of chronic renal failure, stage 4 (severe) (H) 07/29/2022     Priority: Medium     CKD (chronic kidney disease) stage 4, GFR 15-29 ml/min (H) 01/14/2021     Priority: Medium     Dyspnea, unspecified type 01/08/2021     Priority: Medium     Personal history of other drug therapy 10/15/2020     Priority: Medium     Osteoporotic compression fracture of spine, with routine healing, subsequent encounter 10/14/2020     Priority: Medium     Diplopia 02/05/2020     Priority: Medium     Pseudophakia 02/05/2020     Priority: Medium     Myopia of both eyes 02/05/2020     Priority: Medium     History of pulmonary embolism 04/30/2018     Priority: Medium     Thyroid nodule 05/31/2017     Priority: Medium     Aplastic anemia (H) 01/09/2017     Priority: Medium     Pancytopenia (H) 09/14/2016     Priority: Medium     Advanced directives, counseling/discussion 08/07/2015     Priority: Medium     Advance Care Planning 8/7/2015: ACP Review and Resources Provided:  Reviewed chart for advance care plan.  Bonny Raymundo has no plan or code status on file. Discussed available resources and provided  with information. Confirmed code status reflects current choices pending further ACP discussions.  Confirmed/documented legally designated decision maker(s). Added by Odessa Low           DVT, recurrent, lower extremity, acute (H)      Priority: Medium     BPPV (benign paroxysmal positional vertigo) 03/14/2013     Priority: Medium     Anemia 03/14/2013     Priority: Medium     Sensorineural hearing loss of both ears 03/13/2013     Priority: Medium     Health Care Home 09/12/2012     Priority: Medium     Yanet Vivar RN-BC  [phone]  FPA / [facility]          DX V65.8 REPLACED WITH [facility] (04/08/2013)       Seasonal allergic rhinitis 04/12/2011     Priority: Medium     CARDIOVASCULAR SCREENING; LDL GOAL LESS THAN 160 10/31/2010     Priority: Medium     Esophageal reflux 04/20/2004     Priority: Medium     Chest pain 04/20/2004     Priority: Medium     Problem list name updated by automated process. Provider to review       DIARRHEA 03/26/2004     Priority: Medium      Past Medical History:   Diagnosis Date     Allergic rhinitis due to other allergen      Aplastic anemia (H) 01/09/2017     Closed anterior dislocation of humerus      DVT of lower extremity (deep venous thrombosis) (H) 10/2012    Left after prolonged sitting-plane     DVT, recurrent, lower extremity, acute (H) 2014     Headache(784.0)      Osteoporosis, unspecified      Pulmonary emboli (H) 10/2012     Sensorineural hearing loss, unspecified      Sprain of ankle, unspecified site     L     Past Surgical History:   Procedure Laterality Date     ARTHRODESIS FOOT  7-18-11    Hallux valgus R-1st MP joint     BLEPHAROPLASTY BILATERAL  2012, 2014     BONE MARROW BIOPSY, BONE SPECIMEN, NEEDLE/TROCAR N/A 9/26/2016    Procedure: BIOPSY BONE MARROW;  Surgeon: Mu Morgan MD;  Location: [facility]     BONE MARROW BIOPSY, BONE SPECIMEN, NEEDLE/TROCAR N/A 11/21/2016    Procedure: BIOPSY BONE MARROW;   Surgeon: Mu Morgan MD;  Location:  GI     C DEXA INTERPRETATION, AXIAL  03     CATARACT IOL, RT/LT Right      CATARACT IOL, RT/LT Left      COLONOSCOPY N/A 2018    Procedure: COLONOSCOPY;  colonoscopy;  Surgeon: Meliton Castrejon MD;  Location:  GI     EXCHANGE INTRAOCULAR LENS IMPLANT Right 2015    Procedure: EXCHANGE INTRAOCULAR LENS IMPLANT;  Surgeon: Garrett Dawson MD;  Location:  [facility]     PICC INSERTION Left 2017    5fr DL BioFlo PICC, 42cm (2cm external) in the L basilic vein w/ tip in the  SVC RA junction.     VITRECTOMY PARSPLANA WITH 23 GAUGE SYSTEM Right 2015    Procedure: VITRECTOMY PARSPLANA WITH 23 GAUGE SYSTEM;  Surgeon: Racheal Loyd MD;  Location:  [facility]     ZZC NONSPECIFIC PROCEDURE           ZZC NONSPECIFIC PROCEDURE      hysterectomy/BSO     ZZC NONSPECIFIC PROCEDURE      myomectomy (fibroids)     ZZHC COLONOSCOPY THRU STOMA, DIAGNOSTIC      normal- minimal diverticulosis     Current Outpatient Medications   Medication Sig Dispense Refill     ACE/ARB/ARNI NOT PRESCRIBED (INTENTIONAL) Please choose reason not prescribed from choices below.       apixaban ANTICOAGULANT (ELIQUIS) 2.5 MG tablet Take 1 tablet (2.5 mg) by mouth 2 times daily 180 tablet 4     diphenhydrAMINE (BENADRYL) 25 MG tablet Take 25 mg by mouth At Bedtime  56 tablet      gabapentin (NEURONTIN) 100 MG capsule Take 1 capsule (100 mg) by mouth daily as needed for neuropathic pain 30 capsule 0     loperamide (IMODIUM A-D) 2 MG tablet Take 0.5 tablets (1 mg) by mouth daily       multivitamin w/minerals (THERA-VIT-M) tablet Take 1 tablet by mouth daily       pantoprazole (PROTONIX) 20 MG EC tablet TAKE 1 TABLET BY MOUTH EVERY DAY 90 tablet 11     triamcinolone (KENALOG) 0.1 % external cream Apply to AA BID x 1-2 week then PRN only 454 g 2     zinc oxide (DESITIN) 40 % external ointment Apply topically as needed for irritation 56 g 0     Cyanocobalamin  "(VITAMIN B-12 PO) Take 1 tablet by mouth daily (Patient not taking: Reported on 2022)       ketamine 5%-gabapentin 8%-lidocaine 2.5% in PLO cream Apply 4 clicks (1 g) topically daily as needed (for pain/itching on forehead) (Patient not taking: Reported on 2022) 30 g 0     nystatin (MYCOSTATIN) 934247 UNIT/GM external ointment Apply topically 2 times daily (Patient not taking: Reported on 2022) 30 g 0     psyllium (METAMUCIL/KONSYL) 58.6 % powder Take by mouth daily (Patient not taking: Reported on 2022)         Allergies   Allergen Reactions     Cats      Dogs      Seasonal Allergies         Social History     Tobacco Use     Smoking status: Former     Types: Cigarettes     Quit date: 1973     Years since quittin.6     Smokeless tobacco: Never   Substance Use Topics     Alcohol use: No     Alcohol/week: 0.0 standard drinks     Comment: occasionally     Family History   Problem Relation Age of Onset     Musculoskeletal Disorder Mother         MS     Heart Disease Father      Heart Disease Brother         afib     History   Drug Use No         Objective     /70 (BP Location: Right arm, Patient Position: Sitting, Cuff Size: Adult Regular)   Pulse 88   Temp (!) 96.6  F (35.9  C) (Tympanic)   Resp 20   Ht 1.575 m (5' 2.01\")   Wt 43.2 kg (95 lb 4.8 oz)   SpO2 99%   BMI 17.42 kg/m      Physical Exam      GENERAL APPEARANCE: in NAD, thin/frail     EYES: no scleral icterus     HENT: OP clear mouth without ulcers or lesions, left ext canal w/tube and cerumen     NECK: supple, no bruits     RESP: lungs clear to auscultation - no rales, rhonchi or wheezes     CV: regular rates and rhythm, normal S1 S2, no S3 or S4 and no murmur, click or rub     ABDOMEN:  soft, nontender, no HSM or masses and bowel sounds normal     MS: extremities normal- no gross deformities noted, no edema        Recent Labs   Lab Test 12/15/22  0817 22  0844 10/27/22  1216 10/05/22  1017 22  1443 " 08/22/22  1336 06/02/22  1741 04/14/22  1453 03/03/22  1106   HGB 7.2* 8.2*   < > 6.6*   < > 6.8*   < > 8.3* 7.7*   PLT 59* 66*   < > 95*   < > 78*   < > 112* 180   INR  --   --   --   --   --   --   --  1.73* 1.81*   NA  --   --   --  139  --  141   < > 140 139   POTASSIUM  --   --   --  3.6  --  3.7   < > 3.8 4.5   CR  --   --   --  1.19*  --  1.13*   < > 1.17* 1.14*    < > = values in this interval not displayed.        Diagnostics:  Labs pending at this time.  Results will be reviewed when available.   No EKG required for low risk surgery (cataract, skin procedure, breast biopsy, etc).    Revised Cardiac Risk Index (RCRI):  The patient has the following serious cardiovascular risks for perioperative complications:   - No serious cardiac risks = 0 points     RCRI Interpretation: 0 points: Class I (very low risk - 0.4% complication rate)           Signed Electronically by: Jade Angela PA-C  Copy of this evaluation report is provided to requesting physician.

## 2022-12-21 NOTE — PATIENT INSTRUCTIONS
Don't take eliquis the evening of 1/2/22, don't take both doses on 1/3/22 and the morning of surgery (1/4/22).  Resume after procedure when your surgeon allows.    Take imodium and tylenol with a sip of water the am of surgery.    Take the rest of your medications as usual the night before surgery.    Please have labs drawn next week in anticipation of surgery

## 2022-12-21 NOTE — PROGRESS NOTES
[address], [address]  [facility] [address]  Phone: [phone]  Primary Provider: Shelli Cash  Pre-op Performing Provider: NOVA COOPER      PREOPERATIVE EVALUATION:  Today's date: 12/21/2022    Bonny Raymundo is a 77 year old female who presents for a preoperative evaluation.    Surgical Information:  Surgery/Procedure: ESOPHAGOGASTRODUODENOSCOPY (EGD), COLONOSCOPY  Surgery Location: [facility]  Surgeon: Sean Dawson MD  Surgery Date: 1/4/2023  Time of Surgery: 1:00pm  Where patient plans to recover: At home with family  Fax number for surgical facility: Note does not need to be faxed, will be available electronically in Epic.    Type of Anesthesia Anticipated: MAC    Assessment & Plan     The proposed surgical procedure is considered LOW risk.    (Z01.818) Preop general physical exam  (primary encounter diagnosis)  Comment: she is transfusion dependent due to chronic/worsening anemia  Plan: cleared for surgery, will need to have labs drawn next week which are ordered, platelets have been low but stable in the 50s-80s    (R19.7) Diarrhea, unspecified type  Comment: daily, on imodium  Plan: colonoscopy     (D61.9) Aplastic anemia (H)  Comment: follows with Dr. Spann, on aranesp   Plan: labs next week    (N18.4) CKD (chronic kidney disease) stage 4, GFR 15-29 ml/min (H)  Comment: baseline GFR 40-50s  Plan: BASIC METABOLIC PANEL  Avoid nephrotoxins     (J44.9) Chronic obstructive pulmonary disease, unspecified COPD type (H)  Comment: stable  Plan:     (I82.402) Recurrent acute deep vein thrombosis (DVT) of left lower extremity (H)  Comment: on eliquis  Plan: w/renal function will have her skip evening dose on 1/2/23, skip both doses on 1/3/23 and am dose before surgery, resume when surgeon allows  We discussed today     Risks and Recommendations:  The patient has the following additional risks and recommendations for  perioperative complications:   - Consult Hospitalist / IM to assist with post-op medical management    Medication Instructions:  Don't take eliquis the evening of 1/2/22, don't take both doses on 1/3/22 and the morning of surgery (1/4/22).  Resume after procedure when your surgeon allows.    Take imodium and tylenol with a sip of water the am of surgery.    Take the rest of your medications as usual the night before surgery.    RECOMMENDATION:  APPROVAL GIVEN to proceed with proposed procedure, without further diagnostic evaluation.    Jaed Angela PA-C  45 minutes on the day of the encounter doing chart review, history and exam, documentation and further activities as noted above.      Subjective     HPI related to upcoming procedure: Dmitriy is here for a pre-op for colonoscopy.  She has history of aplastic anemia.  She has had several blood transfusions this year.  She gets labs drawn about every two weeks, she will be due for labs next week.    She is able to walk up a flight of stairs without chest pain or significant dyspnea.    She is on eliquis for DVT.    Health Care Directive:  Patient does not have a Health Care Directive or Living Will: Patient states has Advance Directive and will bring in a copy to clinic.    Review of Systems  CONSTITUTIONAL: NEGATIVE for fever, chills, change in weight  INTEGUMENTARY/SKIN: NEGATIVE for worrisome rashes, moles or lesions  EYES: POSITIVE for right eye vision changes NEGATIVE for eye irritation  ENT/MOUTH: NEGATIVE for ear, mouth and throat problems  RESP: NEGATIVE for significant cough or SOB  CV: NEGATIVE for chest pain, palpitations or peripheral edema  GI: NEGATIVE for nausea, abdominal pain, heartburn, or change in bowel habits  : NEGATIVE for frequency, dysuria, or hematuria  MUSCULOSKELETAL: NEGATIVE for significant arthralgias or myalgia  NEURO: NEGATIVE for weakness, dizziness or paresthesias  HEME: NEGATIVE for bleeding problems    Patient Active  Problem List    Diagnosis Date Noted     Osteoporosis      Priority: High     Problem list name updated by automated process. Provider to review       Other neutropenia (H) 10/27/2022     Priority: Medium     High priority for 2019-nCoV vaccine 10/13/2022     Priority: Medium     Need for prophylactic vaccination and inoculation against influenza 10/13/2022     Priority: Medium     Post herpetic neuralgia 10/12/2022     Priority: Medium     Intertrigo labialis 10/12/2022     Priority: Medium     Itching 10/12/2022     Priority: Medium     Urinary problem 10/05/2022     Priority: Medium     Intertriginous candidiasis 10/05/2022     Priority: Medium     Cellulitis, unspecified cellulitis site 10/05/2022     Priority: Medium     Vitamin D deficiency 10/05/2022     Priority: Medium     Loss of balance 10/05/2022     Priority: Medium     Anemia of chronic renal failure, stage 4 (severe) (H) 07/29/2022     Priority: Medium     CKD (chronic kidney disease) stage 4, GFR 15-29 ml/min (H) 01/14/2021     Priority: Medium     Dyspnea, unspecified type 01/08/2021     Priority: Medium     Personal history of other drug therapy 10/15/2020     Priority: Medium     Osteoporotic compression fracture of spine, with routine healing, subsequent encounter 10/14/2020     Priority: Medium     Diplopia 02/05/2020     Priority: Medium     Pseudophakia 02/05/2020     Priority: Medium     Myopia of both eyes 02/05/2020     Priority: Medium     History of pulmonary embolism 04/30/2018     Priority: Medium     Thyroid nodule 05/31/2017     Priority: Medium     Aplastic anemia (H) 01/09/2017     Priority: Medium     Pancytopenia (H) 09/14/2016     Priority: Medium     Advanced directives, counseling/discussion 08/07/2015     Priority: Medium     Advance Care Planning 8/7/2015: ACP Review and Resources Provided:  Reviewed chart for advance care plan.  Bonny Raymundo has no plan or code status on file. Discussed available resources and provided  with information. Confirmed code status reflects current choices pending further ACP discussions.  Confirmed/documented legally designated decision maker(s). Added by Odessa Low           DVT, recurrent, lower extremity, acute (H)      Priority: Medium     BPPV (benign paroxysmal positional vertigo) 03/14/2013     Priority: Medium     Anemia 03/14/2013     Priority: Medium     Sensorineural hearing loss of both ears 03/13/2013     Priority: Medium     Health Care Home 09/12/2012     Priority: Medium     Yanet Vivar RN-BC  [phone]  FPA / [facility]          DX V65.8 REPLACED WITH 299[facility] HEALTH CARE HOME (04/08/2013)       Seasonal allergic rhinitis 04/12/2011     Priority: Medium     CARDIOVASCULAR SCREENING; LDL GOAL LESS THAN 160 10/31/2010     Priority: Medium     Esophageal reflux 04/20/2004     Priority: Medium     Chest pain 04/20/2004     Priority: Medium     Problem list name updated by automated process. Provider to review       DIARRHEA 03/26/2004     Priority: Medium      Past Medical History:   Diagnosis Date     Allergic rhinitis due to other allergen      Aplastic anemia (H) 01/09/2017     Closed anterior dislocation of humerus      DVT of lower extremity (deep venous thrombosis) (H) 10/2012    Left after prolonged sitting-plane     DVT, recurrent, lower extremity, acute (H) 2014     Headache(784.0)      Osteoporosis, unspecified      Pulmonary emboli (H) 10/2012     Sensorineural hearing loss, unspecified      Sprain of ankle, unspecified site     L     Past Surgical History:   Procedure Laterality Date     ARTHRODESIS FOOT  7-18-11    Hallux valgus R-1st MP joint     BLEPHAROPLASTY BILATERAL  2012, 2014     BONE MARROW BIOPSY, BONE SPECIMEN, NEEDLE/TROCAR N/A 9/26/2016    Procedure: BIOPSY BONE MARROW;  Surgeon: Mu Morgan MD;  Location: [facility]     BONE MARROW BIOPSY, BONE SPECIMEN, NEEDLE/TROCAR N/A 11/21/2016    Procedure: BIOPSY BONE MARROW;   Surgeon: Mu Morgan MD;  Location:  GI     C DEXA INTERPRETATION, AXIAL  03     CATARACT IOL, RT/LT Right      CATARACT IOL, RT/LT Left      COLONOSCOPY N/A 2018    Procedure: COLONOSCOPY;  colonoscopy;  Surgeon: Meliton Castrejon MD;  Location:  GI     EXCHANGE INTRAOCULAR LENS IMPLANT Right 2015    Procedure: EXCHANGE INTRAOCULAR LENS IMPLANT;  Surgeon: Garrett Dawson MD;  Location:  EC     PICC INSERTION Left 2017    5fr DL BioFlo PICC, 42cm (2cm external) in the L basilic vein w/ tip in the  SVC RA junction.     VITRECTOMY PARSPLANA WITH 23 GAUGE SYSTEM Right 2015    Procedure: VITRECTOMY PARSPLANA WITH 23 GAUGE SYSTEM;  Surgeon: Racheal Loyd MD;  Location:  EC     ZZC NONSPECIFIC PROCEDURE           ZZC NONSPECIFIC PROCEDURE      hysterectomy/BSO     ZZC NONSPECIFIC PROCEDURE      myomectomy (fibroids)     ZZHC COLONOSCOPY THRU STOMA, DIAGNOSTIC      normal- minimal diverticulosis     Current Outpatient Medications   Medication Sig Dispense Refill     ACE/ARB/ARNI NOT PRESCRIBED (INTENTIONAL) Please choose reason not prescribed from choices below.       apixaban ANTICOAGULANT (ELIQUIS) 2.5 MG tablet Take 1 tablet (2.5 mg) by mouth 2 times daily 180 tablet 4     diphenhydrAMINE (BENADRYL) 25 MG tablet Take 25 mg by mouth At Bedtime  56 tablet      gabapentin (NEURONTIN) 100 MG capsule Take 1 capsule (100 mg) by mouth daily as needed for neuropathic pain 30 capsule 0     loperamide (IMODIUM A-D) 2 MG tablet Take 0.5 tablets (1 mg) by mouth daily       multivitamin w/minerals (THERA-VIT-M) tablet Take 1 tablet by mouth daily       pantoprazole (PROTONIX) 20 MG EC tablet TAKE 1 TABLET BY MOUTH EVERY DAY 90 tablet 11     triamcinolone (KENALOG) 0.1 % external cream Apply to AA BID x 1-2 week then PRN only 454 g 2     zinc oxide (DESITIN) 40 % external ointment Apply topically as needed for irritation 56 g 0     Cyanocobalamin  "(VITAMIN B-12 PO) Take 1 tablet by mouth daily (Patient not taking: Reported on 2022)       ketamine 5%-gabapentin 8%-lidocaine 2.5% in PLO cream Apply 4 clicks (1 g) topically daily as needed (for pain/itching on forehead) (Patient not taking: Reported on 2022) 30 g 0     nystatin (MYCOSTATIN) 627754 UNIT/GM external ointment Apply topically 2 times daily (Patient not taking: Reported on 2022) 30 g 0     psyllium (METAMUCIL/KONSYL) 58.6 % powder Take by mouth daily (Patient not taking: Reported on 2022)         Allergies   Allergen Reactions     Cats      Dogs      Seasonal Allergies         Social History     Tobacco Use     Smoking status: Former     Types: Cigarettes     Quit date: 1973     Years since quittin.6     Smokeless tobacco: Never   Substance Use Topics     Alcohol use: No     Alcohol/week: 0.0 standard drinks     Comment: occasionally     Family History   Problem Relation Age of Onset     Musculoskeletal Disorder Mother         MS     Heart Disease Father      Heart Disease Brother         afib     History   Drug Use No         Objective     /70 (BP Location: Right arm, Patient Position: Sitting, Cuff Size: Adult Regular)   Pulse 88   Temp (!) 96.6  F (35.9  C) (Tympanic)   Resp 20   Ht 1.575 m (5' 2.01\")   Wt 43.2 kg (95 lb 4.8 oz)   SpO2 99%   BMI 17.42 kg/m      Physical Exam      GENERAL APPEARANCE: in NAD, thin/frail     EYES: no scleral icterus     HENT: OP clear mouth without ulcers or lesions, left ext canal w/tube and cerumen     NECK: supple, no bruits     RESP: lungs clear to auscultation - no rales, rhonchi or wheezes     CV: regular rates and rhythm, normal S1 S2, no S3 or S4 and no murmur, click or rub     ABDOMEN:  soft, nontender, no HSM or masses and bowel sounds normal     MS: extremities normal- no gross deformities noted, no edema        Recent Labs   Lab Test 12/15/22  0817 22  0844 10/27/22  1216 10/05/22  1017 22  1443 " 08/22/22  1336 06/02/22  1741 04/14/22  1453 03/03/22  1106   HGB 7.2* 8.2*   < > 6.6*   < > 6.8*   < > 8.3* 7.7*   PLT 59* 66*   < > 95*   < > 78*   < > 112* 180   INR  --   --   --   --   --   --   --  1.73* 1.81*   NA  --   --   --  139  --  141   < > 140 139   POTASSIUM  --   --   --  3.6  --  3.7   < > 3.8 4.5   CR  --   --   --  1.19*  --  1.13*   < > 1.17* 1.14*    < > = values in this interval not displayed.        Diagnostics:  Labs pending at this time.  Results will be reviewed when available.   No EKG required for low risk surgery (cataract, skin procedure, breast biopsy, etc).    Revised Cardiac Risk Index (RCRI):  The patient has the following serious cardiovascular risks for perioperative complications:   - No serious cardiac risks = 0 points     RCRI Interpretation: 0 points: Class I (very low risk - 0.4% complication rate)           Signed Electronically by: Jade Angela PA-C  Copy of this evaluation report is provided to requesting physician.

## 2022-12-22 NOTE — TELEPHONE ENCOUNTER
Attempted to contact patient regarding upcoming colonoscopy/upper endoscopy (EGD) procedure on 1/4/2023 for pre assessment questions. No answer.     Left message to return call to [phone] #4    Discuss Covid policy.     Pre op exam scheduled: Yes 12/21/2022 with FP-Jade Rogers PAC    Arrival time: 1200    Facility location: [facility]; [facility] [address] [place] [address]    Sedation type: MAC    Anticoagulants: Yes Apixaban (Eliquis). Holding interval of 2 days, per pre-op report, okay to HOLD for 2 days    Electronic implanted devices? No    Diabetic? No    Indication for procedure: chronic diarrhea and weight loss - gastric and duodenal biopsies    Bowel prep recommendation: Standard Golytely d/t CKD-4    Prep instructions sent via [employer] [place]. Bowel prep script sent to    [facility] #[id number] - [place] - [address]      Amy Bermudez RN

## 2022-12-22 NOTE — PROGRESS NOTES
[facility]: Cancer Care                                                                                           for pt that Dr Spann would like for her to follow-up and go over her BMBx results on 1/4/23 at 11am.  Left clinic number for her to call and confirm that that time and date will work for her.      Signature:  Sue Marquez RN

## 2022-12-24 PROBLEM — D46.22 RAEB-2 (REFRACTORY ANEMIA WITH EXCESS BLASTS-2) (H): Chronic | Status: ACTIVE | Noted: 2022-01-01

## 2022-12-26 NOTE — TELEPHONE ENCOUNTER
Second attempt for pre-assessment prior to upcoming colonoscopy/upper endoscopy     No answer.  Left message to return call [phone] #4    Will send [employer] message to return call.     Jennie Munoz RN

## 2022-12-28 NOTE — TELEPHONE ENCOUNTER
Reason for Call:  Form, our goal is to have forms completed with 72 hours, however, some forms may require a visit or additional information.    Type of letter, form or note:  handicap    Who is the form from?: Patient    Where did the form come from: Patient or family brought in       What clinic location was the form placed at?: [facility]    Where the form was placed:  black bin Box/Folder    What number is listed as a contact on the form?: patient home       Additional comments: reach out to patient once paper work is done.    Call taken on 12/28/2022 at 8:49 AM by Sue Spencer

## 2022-12-29 NOTE — TELEPHONE ENCOUNTER
Called and informed Pt completed form is ready to  at the .  Copy sent to [facility] and placed in [employer] accordion file

## 2023-01-01 ENCOUNTER — ANCILLARY PROCEDURE (OUTPATIENT)
Dept: CT IMAGING | Facility: CLINIC | Age: 78
End: 2023-01-01
Payer: COMMERCIAL

## 2023-01-01 ENCOUNTER — INFUSION THERAPY VISIT (OUTPATIENT)
Dept: INFUSION THERAPY | Facility: CLINIC | Age: 78
End: 2023-01-01
Attending: INTERNAL MEDICINE
Payer: COMMERCIAL

## 2023-01-01 ENCOUNTER — LAB (OUTPATIENT)
Dept: LAB | Facility: CLINIC | Age: 78
End: 2023-01-01
Payer: COMMERCIAL

## 2023-01-01 ENCOUNTER — ONCOLOGY VISIT (OUTPATIENT)
Dept: ONCOLOGY | Facility: CLINIC | Age: 78
End: 2023-01-01
Attending: INTERNAL MEDICINE
Payer: COMMERCIAL

## 2023-01-01 ENCOUNTER — PATIENT OUTREACH (OUTPATIENT)
Dept: ONCOLOGY | Facility: CLINIC | Age: 78
End: 2023-01-01
Payer: COMMERCIAL

## 2023-01-01 ENCOUNTER — TELEPHONE (OUTPATIENT)
Dept: ONCOLOGY | Facility: CLINIC | Age: 78
End: 2023-01-01
Payer: COMMERCIAL

## 2023-01-01 ENCOUNTER — TELEPHONE (OUTPATIENT)
Dept: ONCOLOGY | Facility: CLINIC | Age: 78
End: 2023-01-01

## 2023-01-01 ENCOUNTER — PATIENT OUTREACH (OUTPATIENT)
Dept: ONCOLOGY | Facility: HOSPITAL | Age: 78
End: 2023-01-01
Payer: COMMERCIAL

## 2023-01-01 ENCOUNTER — NURSE TRIAGE (OUTPATIENT)
Dept: ONCOLOGY | Facility: CLINIC | Age: 78
End: 2023-01-01

## 2023-01-01 ENCOUNTER — APPOINTMENT (OUTPATIENT)
Dept: LAB | Facility: CLINIC | Age: 78
End: 2023-01-01
Attending: INTERNAL MEDICINE
Payer: COMMERCIAL

## 2023-01-01 ENCOUNTER — APPOINTMENT (OUTPATIENT)
Dept: LAB | Facility: CLINIC | Age: 78
End: 2023-01-01
Payer: COMMERCIAL

## 2023-01-01 ENCOUNTER — INFUSION THERAPY VISIT (OUTPATIENT)
Dept: ONCOLOGY | Facility: CLINIC | Age: 78
End: 2023-01-01
Payer: COMMERCIAL

## 2023-01-01 ENCOUNTER — HOSPITAL ENCOUNTER (OUTPATIENT)
Facility: CLINIC | Age: 78
Discharge: HOME | End: 2023-01-04
Attending: INTERNAL MEDICINE | Admitting: INTERNAL MEDICINE
Payer: COMMERCIAL

## 2023-01-01 ENCOUNTER — TRANSFERRED RECORDS (OUTPATIENT)
Dept: HEALTH INFORMATION MANAGEMENT | Facility: CLINIC | Age: 78
End: 2023-01-01

## 2023-01-01 ENCOUNTER — NURSE TRIAGE (OUTPATIENT)
Dept: FAMILY MEDICINE | Facility: CLINIC | Age: 78
End: 2023-01-01

## 2023-01-01 ENCOUNTER — NURSE TRIAGE (OUTPATIENT)
Dept: ONCOLOGY | Facility: CLINIC | Age: 78
End: 2023-01-01
Payer: COMMERCIAL

## 2023-01-01 ENCOUNTER — PATIENT OUTREACH (OUTPATIENT)
Dept: CARE COORDINATION | Facility: CLINIC | Age: 78
End: 2023-01-01

## 2023-01-01 ENCOUNTER — PATIENT OUTREACH (OUTPATIENT)
Dept: GASTROENTEROLOGY | Facility: CLINIC | Age: 78
End: 2023-01-01

## 2023-01-01 ENCOUNTER — OFFICE VISIT (OUTPATIENT)
Dept: PODIATRY | Facility: CLINIC | Age: 78
End: 2023-01-01
Payer: COMMERCIAL

## 2023-01-01 ENCOUNTER — TELEPHONE (OUTPATIENT)
Dept: ONCOLOGY | Facility: HOSPITAL | Age: 78
End: 2023-01-01
Payer: COMMERCIAL

## 2023-01-01 ENCOUNTER — PATIENT OUTREACH (OUTPATIENT)
Dept: ONCOLOGY | Facility: CLINIC | Age: 78
End: 2023-01-01

## 2023-01-01 ENCOUNTER — INFUSION THERAPY VISIT (OUTPATIENT)
Dept: TRANSPLANT | Facility: CLINIC | Age: 78
End: 2023-01-01
Attending: INTERNAL MEDICINE
Payer: COMMERCIAL

## 2023-01-01 ENCOUNTER — TELEPHONE (OUTPATIENT)
Dept: FAMILY MEDICINE | Facility: CLINIC | Age: 78
End: 2023-01-01
Payer: COMMERCIAL

## 2023-01-01 ENCOUNTER — DOCUMENTATION ONLY (OUTPATIENT)
Dept: ONCOLOGY | Facility: CLINIC | Age: 78
End: 2023-01-01

## 2023-01-01 ENCOUNTER — OFFICE VISIT (OUTPATIENT)
Dept: FAMILY MEDICINE | Facility: CLINIC | Age: 78
End: 2023-01-01
Payer: COMMERCIAL

## 2023-01-01 ENCOUNTER — ANESTHESIA EVENT (OUTPATIENT)
Dept: SURGERY | Facility: CLINIC | Age: 78
DRG: 693 | End: 2023-01-01
Payer: COMMERCIAL

## 2023-01-01 ENCOUNTER — ONCOLOGY VISIT (OUTPATIENT)
Dept: ONCOLOGY | Facility: HOSPITAL | Age: 78
End: 2023-01-01
Attending: INTERNAL MEDICINE
Payer: COMMERCIAL

## 2023-01-01 ENCOUNTER — DOCUMENTATION ONLY (OUTPATIENT)
Dept: PHARMACY | Facility: CLINIC | Age: 78
End: 2023-01-01
Payer: COMMERCIAL

## 2023-01-01 ENCOUNTER — ANESTHESIA (OUTPATIENT)
Dept: GASTROENTEROLOGY | Facility: CLINIC | Age: 78
End: 2023-01-01
Payer: COMMERCIAL

## 2023-01-01 ENCOUNTER — NURSE TRIAGE (OUTPATIENT)
Dept: FAMILY MEDICINE | Facility: CLINIC | Age: 78
End: 2023-01-01
Payer: COMMERCIAL

## 2023-01-01 ENCOUNTER — ANESTHESIA EVENT (OUTPATIENT)
Dept: GASTROENTEROLOGY | Facility: CLINIC | Age: 78
End: 2023-01-01
Payer: COMMERCIAL

## 2023-01-01 ENCOUNTER — ANESTHESIA (OUTPATIENT)
Dept: SURGERY | Facility: CLINIC | Age: 78
DRG: 693 | End: 2023-01-01
Payer: COMMERCIAL

## 2023-01-01 ENCOUNTER — HOSPITAL ENCOUNTER (INPATIENT)
Facility: CLINIC | Age: 78
LOS: 4 days | Discharge: HOME HEALTH | DRG: 693 | End: 2023-08-11
Attending: EMERGENCY MEDICINE | Admitting: HOSPITALIST
Payer: COMMERCIAL

## 2023-01-01 ENCOUNTER — ONCOLOGY VISIT (OUTPATIENT)
Dept: ONCOLOGY | Facility: CLINIC | Age: 78
End: 2023-01-01
Payer: COMMERCIAL

## 2023-01-01 ENCOUNTER — APPOINTMENT (OUTPATIENT)
Dept: GENERAL RADIOLOGY | Facility: CLINIC | Age: 78
DRG: 693 | End: 2023-01-01
Attending: HOSPITALIST
Payer: COMMERCIAL

## 2023-01-01 ENCOUNTER — DOCUMENTATION ONLY (OUTPATIENT)
Dept: ONCOLOGY | Facility: CLINIC | Age: 78
End: 2023-01-01
Payer: COMMERCIAL

## 2023-01-01 ENCOUNTER — MYC MEDICAL ADVICE (OUTPATIENT)
Dept: GASTROENTEROLOGY | Facility: CLINIC | Age: 78
End: 2023-01-01
Payer: COMMERCIAL

## 2023-01-01 ENCOUNTER — HOME INFUSION (PRE-WILLOW HOME INFUSION) (OUTPATIENT)
Dept: PHARMACY | Facility: CLINIC | Age: 78
End: 2023-01-01
Payer: COMMERCIAL

## 2023-01-01 ENCOUNTER — TELEPHONE (OUTPATIENT)
Dept: HEMATOLOGY | Facility: CLINIC | Age: 78
End: 2023-01-01

## 2023-01-01 ENCOUNTER — TRANSFERRED RECORDS (OUTPATIENT)
Dept: HEALTH INFORMATION MANAGEMENT | Facility: CLINIC | Age: 78
End: 2023-01-01
Payer: COMMERCIAL

## 2023-01-01 ENCOUNTER — APPOINTMENT (OUTPATIENT)
Dept: PHYSICAL THERAPY | Facility: CLINIC | Age: 78
DRG: 693 | End: 2023-01-01
Attending: INTERNAL MEDICINE
Payer: COMMERCIAL

## 2023-01-01 ENCOUNTER — HEALTH MAINTENANCE LETTER (OUTPATIENT)
Age: 78
End: 2023-01-01

## 2023-01-01 VITALS
OXYGEN SATURATION: 98 % | SYSTOLIC BLOOD PRESSURE: 105 MMHG | TEMPERATURE: 98.2 F | HEART RATE: 64 BPM | RESPIRATION RATE: 16 BRPM | DIASTOLIC BLOOD PRESSURE: 65 MMHG

## 2023-01-01 VITALS
SYSTOLIC BLOOD PRESSURE: 137 MMHG | SYSTOLIC BLOOD PRESSURE: 100 MMHG | DIASTOLIC BLOOD PRESSURE: 66 MMHG | OXYGEN SATURATION: 96 % | OXYGEN SATURATION: 97 % | RESPIRATION RATE: 16 BRPM | WEIGHT: 98.2 LBS | TEMPERATURE: 98.4 F | TEMPERATURE: 97.9 F | HEART RATE: 69 BPM | DIASTOLIC BLOOD PRESSURE: 85 MMHG | BODY MASS INDEX: 19.18 KG/M2 | HEART RATE: 81 BPM | RESPIRATION RATE: 16 BRPM

## 2023-01-01 VITALS
TEMPERATURE: 98 F | RESPIRATION RATE: 20 BRPM | HEART RATE: 83 BPM | DIASTOLIC BLOOD PRESSURE: 63 MMHG | OXYGEN SATURATION: 96 % | SYSTOLIC BLOOD PRESSURE: 97 MMHG

## 2023-01-01 VITALS
OXYGEN SATURATION: 97 % | TEMPERATURE: 98.4 F | RESPIRATION RATE: 16 BRPM | DIASTOLIC BLOOD PRESSURE: 65 MMHG | HEART RATE: 86 BPM | SYSTOLIC BLOOD PRESSURE: 105 MMHG

## 2023-01-01 VITALS
SYSTOLIC BLOOD PRESSURE: 103 MMHG | TEMPERATURE: 98 F | OXYGEN SATURATION: 95 % | RESPIRATION RATE: 18 BRPM | HEART RATE: 94 BPM | DIASTOLIC BLOOD PRESSURE: 66 MMHG

## 2023-01-01 VITALS
DIASTOLIC BLOOD PRESSURE: 72 MMHG | SYSTOLIC BLOOD PRESSURE: 140 MMHG | RESPIRATION RATE: 16 BRPM | TEMPERATURE: 98.8 F | OXYGEN SATURATION: 100 % | HEART RATE: 76 BPM

## 2023-01-01 VITALS
TEMPERATURE: 98.1 F | RESPIRATION RATE: 20 BRPM | SYSTOLIC BLOOD PRESSURE: 121 MMHG | OXYGEN SATURATION: 98 % | DIASTOLIC BLOOD PRESSURE: 67 MMHG | HEART RATE: 68 BPM

## 2023-01-01 VITALS
SYSTOLIC BLOOD PRESSURE: 108 MMHG | TEMPERATURE: 98.4 F | OXYGEN SATURATION: 99 % | TEMPERATURE: 98.1 F | OXYGEN SATURATION: 98 % | HEART RATE: 73 BPM | BODY MASS INDEX: 18.82 KG/M2 | DIASTOLIC BLOOD PRESSURE: 76 MMHG | RESPIRATION RATE: 16 BRPM | RESPIRATION RATE: 16 BRPM | SYSTOLIC BLOOD PRESSURE: 150 MMHG | HEART RATE: 71 BPM | WEIGHT: 99.6 LBS | DIASTOLIC BLOOD PRESSURE: 66 MMHG

## 2023-01-01 VITALS
OXYGEN SATURATION: 97 % | DIASTOLIC BLOOD PRESSURE: 64 MMHG | HEART RATE: 70 BPM | RESPIRATION RATE: 14 BRPM | TEMPERATURE: 98.1 F | SYSTOLIC BLOOD PRESSURE: 99 MMHG

## 2023-01-01 VITALS
HEART RATE: 73 BPM | SYSTOLIC BLOOD PRESSURE: 103 MMHG | OXYGEN SATURATION: 97 % | DIASTOLIC BLOOD PRESSURE: 65 MMHG | RESPIRATION RATE: 16 BRPM | TEMPERATURE: 97.8 F

## 2023-01-01 VITALS
OXYGEN SATURATION: 99 % | DIASTOLIC BLOOD PRESSURE: 81 MMHG | TEMPERATURE: 98.1 F | SYSTOLIC BLOOD PRESSURE: 127 MMHG | HEART RATE: 93 BPM | RESPIRATION RATE: 16 BRPM

## 2023-01-01 VITALS
BODY MASS INDEX: 18.67 KG/M2 | SYSTOLIC BLOOD PRESSURE: 111 MMHG | WEIGHT: 98.8 LBS | OXYGEN SATURATION: 98 % | TEMPERATURE: 97.7 F | HEART RATE: 96 BPM | DIASTOLIC BLOOD PRESSURE: 70 MMHG

## 2023-01-01 VITALS
OXYGEN SATURATION: 98 % | DIASTOLIC BLOOD PRESSURE: 72 MMHG | DIASTOLIC BLOOD PRESSURE: 78 MMHG | SYSTOLIC BLOOD PRESSURE: 113 MMHG | TEMPERATURE: 98.5 F | SYSTOLIC BLOOD PRESSURE: 122 MMHG | RESPIRATION RATE: 16 BRPM | HEART RATE: 69 BPM

## 2023-01-01 VITALS
SYSTOLIC BLOOD PRESSURE: 116 MMHG | OXYGEN SATURATION: 96 % | DIASTOLIC BLOOD PRESSURE: 72 MMHG | HEART RATE: 71 BPM | RESPIRATION RATE: 16 BRPM | TEMPERATURE: 98.2 F

## 2023-01-01 VITALS
DIASTOLIC BLOOD PRESSURE: 70 MMHG | SYSTOLIC BLOOD PRESSURE: 129 MMHG | HEART RATE: 67 BPM | OXYGEN SATURATION: 100 % | RESPIRATION RATE: 16 BRPM | TEMPERATURE: 98.4 F

## 2023-01-01 VITALS
HEART RATE: 79 BPM | RESPIRATION RATE: 16 BRPM | SYSTOLIC BLOOD PRESSURE: 124 MMHG | WEIGHT: 100.8 LBS | BODY MASS INDEX: 19.69 KG/M2 | DIASTOLIC BLOOD PRESSURE: 76 MMHG | OXYGEN SATURATION: 98 % | TEMPERATURE: 98.1 F

## 2023-01-01 VITALS
DIASTOLIC BLOOD PRESSURE: 66 MMHG | RESPIRATION RATE: 12 BRPM | HEART RATE: 68 BPM | TEMPERATURE: 98 F | OXYGEN SATURATION: 100 % | SYSTOLIC BLOOD PRESSURE: 113 MMHG

## 2023-01-01 VITALS
RESPIRATION RATE: 18 BRPM | HEART RATE: 75 BPM | SYSTOLIC BLOOD PRESSURE: 126 MMHG | DIASTOLIC BLOOD PRESSURE: 56 MMHG | OXYGEN SATURATION: 99 % | TEMPERATURE: 98.6 F

## 2023-01-01 VITALS
SYSTOLIC BLOOD PRESSURE: 92 MMHG | BODY MASS INDEX: 17.59 KG/M2 | HEART RATE: 102 BPM | RESPIRATION RATE: 16 BRPM | OXYGEN SATURATION: 100 % | WEIGHT: 93.1 LBS | DIASTOLIC BLOOD PRESSURE: 60 MMHG | TEMPERATURE: 97 F

## 2023-01-01 VITALS
DIASTOLIC BLOOD PRESSURE: 67 MMHG | TEMPERATURE: 98 F | WEIGHT: 98.8 LBS | BODY MASS INDEX: 18.67 KG/M2 | RESPIRATION RATE: 18 BRPM | OXYGEN SATURATION: 99 % | SYSTOLIC BLOOD PRESSURE: 137 MMHG | HEART RATE: 85 BPM

## 2023-01-01 VITALS
TEMPERATURE: 97.5 F | WEIGHT: 97.7 LBS | HEART RATE: 112 BPM | RESPIRATION RATE: 16 BRPM | BODY MASS INDEX: 18.46 KG/M2 | OXYGEN SATURATION: 100 % | SYSTOLIC BLOOD PRESSURE: 82 MMHG | DIASTOLIC BLOOD PRESSURE: 55 MMHG

## 2023-01-01 VITALS
DIASTOLIC BLOOD PRESSURE: 54 MMHG | TEMPERATURE: 97.7 F | RESPIRATION RATE: 18 BRPM | OXYGEN SATURATION: 98 % | SYSTOLIC BLOOD PRESSURE: 116 MMHG | HEART RATE: 70 BPM

## 2023-01-01 VITALS
SYSTOLIC BLOOD PRESSURE: 104 MMHG | RESPIRATION RATE: 16 BRPM | HEART RATE: 75 BPM | TEMPERATURE: 98.2 F | DIASTOLIC BLOOD PRESSURE: 65 MMHG | OXYGEN SATURATION: 98 %

## 2023-01-01 VITALS
RESPIRATION RATE: 18 BRPM | TEMPERATURE: 98.1 F | HEART RATE: 77 BPM | DIASTOLIC BLOOD PRESSURE: 76 MMHG | OXYGEN SATURATION: 98 % | SYSTOLIC BLOOD PRESSURE: 121 MMHG

## 2023-01-01 VITALS
HEART RATE: 81 BPM | RESPIRATION RATE: 20 BRPM | OXYGEN SATURATION: 98 % | TEMPERATURE: 98.5 F | DIASTOLIC BLOOD PRESSURE: 76 MMHG | SYSTOLIC BLOOD PRESSURE: 124 MMHG

## 2023-01-01 VITALS
TEMPERATURE: 98.5 F | DIASTOLIC BLOOD PRESSURE: 75 MMHG | SYSTOLIC BLOOD PRESSURE: 130 MMHG | RESPIRATION RATE: 18 BRPM | RESPIRATION RATE: 18 BRPM | HEART RATE: 75 BPM | TEMPERATURE: 98.4 F | DIASTOLIC BLOOD PRESSURE: 63 MMHG | SYSTOLIC BLOOD PRESSURE: 100 MMHG | OXYGEN SATURATION: 94 % | HEART RATE: 79 BPM | OXYGEN SATURATION: 97 %

## 2023-01-01 VITALS
DIASTOLIC BLOOD PRESSURE: 72 MMHG | RESPIRATION RATE: 18 BRPM | OXYGEN SATURATION: 97 % | TEMPERATURE: 97.1 F | SYSTOLIC BLOOD PRESSURE: 128 MMHG | BODY MASS INDEX: 19.27 KG/M2 | WEIGHT: 102 LBS | HEART RATE: 77 BPM

## 2023-01-01 VITALS
WEIGHT: 99.4 LBS | RESPIRATION RATE: 16 BRPM | RESPIRATION RATE: 16 BRPM | HEART RATE: 107 BPM | DIASTOLIC BLOOD PRESSURE: 73 MMHG | TEMPERATURE: 97.8 F | TEMPERATURE: 97.9 F | BODY MASS INDEX: 18.78 KG/M2 | SYSTOLIC BLOOD PRESSURE: 119 MMHG | SYSTOLIC BLOOD PRESSURE: 103 MMHG | HEART RATE: 109 BPM | WEIGHT: 103 LBS | OXYGEN SATURATION: 100 % | BODY MASS INDEX: 19.46 KG/M2 | OXYGEN SATURATION: 97 % | DIASTOLIC BLOOD PRESSURE: 69 MMHG

## 2023-01-01 VITALS
WEIGHT: 100.1 LBS | TEMPERATURE: 98.5 F | OXYGEN SATURATION: 97 % | RESPIRATION RATE: 16 BRPM | HEART RATE: 83 BPM | BODY MASS INDEX: 18.91 KG/M2 | SYSTOLIC BLOOD PRESSURE: 130 MMHG | DIASTOLIC BLOOD PRESSURE: 66 MMHG

## 2023-01-01 VITALS
WEIGHT: 99.6 LBS | HEART RATE: 78 BPM | DIASTOLIC BLOOD PRESSURE: 66 MMHG | OXYGEN SATURATION: 94 % | TEMPERATURE: 98 F | SYSTOLIC BLOOD PRESSURE: 110 MMHG | HEIGHT: 60 IN | RESPIRATION RATE: 16 BRPM | BODY MASS INDEX: 19.56 KG/M2

## 2023-01-01 VITALS
TEMPERATURE: 97.8 F | WEIGHT: 96.9 LBS | DIASTOLIC BLOOD PRESSURE: 69 MMHG | RESPIRATION RATE: 16 BRPM | HEART RATE: 106 BPM | OXYGEN SATURATION: 98 % | BODY MASS INDEX: 18.92 KG/M2 | SYSTOLIC BLOOD PRESSURE: 106 MMHG

## 2023-01-01 VITALS
SYSTOLIC BLOOD PRESSURE: 127 MMHG | TEMPERATURE: 98.2 F | OXYGEN SATURATION: 96 % | HEART RATE: 69 BPM | RESPIRATION RATE: 14 BRPM | DIASTOLIC BLOOD PRESSURE: 71 MMHG

## 2023-01-01 VITALS
TEMPERATURE: 98.1 F | HEART RATE: 83 BPM | OXYGEN SATURATION: 97 % | SYSTOLIC BLOOD PRESSURE: 94 MMHG | DIASTOLIC BLOOD PRESSURE: 59 MMHG | RESPIRATION RATE: 18 BRPM

## 2023-01-01 VITALS
RESPIRATION RATE: 16 BRPM | SYSTOLIC BLOOD PRESSURE: 129 MMHG | HEART RATE: 89 BPM | DIASTOLIC BLOOD PRESSURE: 75 MMHG | TEMPERATURE: 98.5 F | OXYGEN SATURATION: 99 %

## 2023-01-01 VITALS
OXYGEN SATURATION: 100 % | SYSTOLIC BLOOD PRESSURE: 126 MMHG | RESPIRATION RATE: 16 BRPM | HEART RATE: 82 BPM | DIASTOLIC BLOOD PRESSURE: 74 MMHG | TEMPERATURE: 98.5 F

## 2023-01-01 VITALS
HEART RATE: 72 BPM | WEIGHT: 99.1 LBS | RESPIRATION RATE: 16 BRPM | SYSTOLIC BLOOD PRESSURE: 129 MMHG | TEMPERATURE: 97.8 F | OXYGEN SATURATION: 98 % | BODY MASS INDEX: 18.72 KG/M2 | DIASTOLIC BLOOD PRESSURE: 74 MMHG

## 2023-01-01 VITALS
RESPIRATION RATE: 16 BRPM | BODY MASS INDEX: 19.08 KG/M2 | OXYGEN SATURATION: 99 % | SYSTOLIC BLOOD PRESSURE: 107 MMHG | DIASTOLIC BLOOD PRESSURE: 68 MMHG | HEART RATE: 81 BPM | WEIGHT: 97.7 LBS | TEMPERATURE: 97.7 F

## 2023-01-01 VITALS
DIASTOLIC BLOOD PRESSURE: 64 MMHG | RESPIRATION RATE: 25 BRPM | SYSTOLIC BLOOD PRESSURE: 111 MMHG | BODY MASS INDEX: 17.94 KG/M2 | WEIGHT: 95 LBS | HEIGHT: 61 IN | HEART RATE: 98 BPM | OXYGEN SATURATION: 99 %

## 2023-01-01 VITALS
TEMPERATURE: 98 F | HEART RATE: 74 BPM | SYSTOLIC BLOOD PRESSURE: 114 MMHG | OXYGEN SATURATION: 99 % | RESPIRATION RATE: 18 BRPM | DIASTOLIC BLOOD PRESSURE: 70 MMHG

## 2023-01-01 VITALS
TEMPERATURE: 97.9 F | RESPIRATION RATE: 16 BRPM | SYSTOLIC BLOOD PRESSURE: 95 MMHG | DIASTOLIC BLOOD PRESSURE: 61 MMHG | HEART RATE: 106 BPM

## 2023-01-01 VITALS
RESPIRATION RATE: 16 BRPM | SYSTOLIC BLOOD PRESSURE: 87 MMHG | DIASTOLIC BLOOD PRESSURE: 59 MMHG | OXYGEN SATURATION: 100 % | BODY MASS INDEX: 18.71 KG/M2 | HEART RATE: 118 BPM | TEMPERATURE: 97.9 F | WEIGHT: 99 LBS

## 2023-01-01 VITALS
TEMPERATURE: 98.3 F | DIASTOLIC BLOOD PRESSURE: 69 MMHG | OXYGEN SATURATION: 96 % | HEART RATE: 64 BPM | SYSTOLIC BLOOD PRESSURE: 119 MMHG | RESPIRATION RATE: 16 BRPM

## 2023-01-01 VITALS
OXYGEN SATURATION: 99 % | WEIGHT: 104.1 LBS | HEART RATE: 92 BPM | DIASTOLIC BLOOD PRESSURE: 63 MMHG | SYSTOLIC BLOOD PRESSURE: 105 MMHG | TEMPERATURE: 98.3 F | BODY MASS INDEX: 19.67 KG/M2

## 2023-01-01 VITALS
DIASTOLIC BLOOD PRESSURE: 62 MMHG | RESPIRATION RATE: 14 BRPM | HEART RATE: 80 BPM | TEMPERATURE: 98.2 F | SYSTOLIC BLOOD PRESSURE: 95 MMHG | OXYGEN SATURATION: 97 %

## 2023-01-01 VITALS
OXYGEN SATURATION: 98 % | DIASTOLIC BLOOD PRESSURE: 50 MMHG | SYSTOLIC BLOOD PRESSURE: 99 MMHG | RESPIRATION RATE: 14 BRPM | HEART RATE: 85 BPM | TEMPERATURE: 97.6 F

## 2023-01-01 VITALS
RESPIRATION RATE: 16 BRPM | HEART RATE: 73 BPM | SYSTOLIC BLOOD PRESSURE: 109 MMHG | DIASTOLIC BLOOD PRESSURE: 62 MMHG | RESPIRATION RATE: 16 BRPM | WEIGHT: 103 LBS | OXYGEN SATURATION: 97 % | DIASTOLIC BLOOD PRESSURE: 67 MMHG | TEMPERATURE: 98.7 F | OXYGEN SATURATION: 98 % | BODY MASS INDEX: 19.45 KG/M2 | TEMPERATURE: 98.4 F | HEART RATE: 93 BPM | SYSTOLIC BLOOD PRESSURE: 108 MMHG | HEIGHT: 61 IN

## 2023-01-01 VITALS
WEIGHT: 98.7 LBS | RESPIRATION RATE: 18 BRPM | BODY MASS INDEX: 19.28 KG/M2 | OXYGEN SATURATION: 100 % | TEMPERATURE: 98.1 F | DIASTOLIC BLOOD PRESSURE: 79 MMHG | SYSTOLIC BLOOD PRESSURE: 149 MMHG | HEART RATE: 69 BPM

## 2023-01-01 VITALS
BODY MASS INDEX: 19.37 KG/M2 | DIASTOLIC BLOOD PRESSURE: 88 MMHG | WEIGHT: 99.2 LBS | OXYGEN SATURATION: 95 % | TEMPERATURE: 98.2 F | SYSTOLIC BLOOD PRESSURE: 171 MMHG | RESPIRATION RATE: 18 BRPM | HEART RATE: 94 BPM

## 2023-01-01 VITALS
TEMPERATURE: 98.2 F | OXYGEN SATURATION: 98 % | SYSTOLIC BLOOD PRESSURE: 123 MMHG | RESPIRATION RATE: 20 BRPM | HEART RATE: 78 BPM | DIASTOLIC BLOOD PRESSURE: 84 MMHG

## 2023-01-01 VITALS
RESPIRATION RATE: 18 BRPM | SYSTOLIC BLOOD PRESSURE: 119 MMHG | OXYGEN SATURATION: 98 % | HEART RATE: 88 BPM | TEMPERATURE: 97.8 F | DIASTOLIC BLOOD PRESSURE: 72 MMHG

## 2023-01-01 VITALS
SYSTOLIC BLOOD PRESSURE: 120 MMHG | OXYGEN SATURATION: 98 % | WEIGHT: 98 LBS | RESPIRATION RATE: 18 BRPM | HEART RATE: 71 BPM | DIASTOLIC BLOOD PRESSURE: 72 MMHG | TEMPERATURE: 97.6 F | BODY MASS INDEX: 19.14 KG/M2

## 2023-01-01 VITALS
TEMPERATURE: 98 F | RESPIRATION RATE: 16 BRPM | SYSTOLIC BLOOD PRESSURE: 118 MMHG | OXYGEN SATURATION: 98 % | DIASTOLIC BLOOD PRESSURE: 63 MMHG | HEART RATE: 70 BPM

## 2023-01-01 VITALS
DIASTOLIC BLOOD PRESSURE: 55 MMHG | RESPIRATION RATE: 18 BRPM | HEART RATE: 86 BPM | TEMPERATURE: 98.2 F | SYSTOLIC BLOOD PRESSURE: 88 MMHG

## 2023-01-01 VITALS
SYSTOLIC BLOOD PRESSURE: 113 MMHG | DIASTOLIC BLOOD PRESSURE: 68 MMHG | RESPIRATION RATE: 16 BRPM | TEMPERATURE: 97.9 F | OXYGEN SATURATION: 98 % | HEART RATE: 75 BPM

## 2023-01-01 VITALS
WEIGHT: 100 LBS | OXYGEN SATURATION: 97 % | HEART RATE: 86 BPM | DIASTOLIC BLOOD PRESSURE: 68 MMHG | SYSTOLIC BLOOD PRESSURE: 102 MMHG | BODY MASS INDEX: 19.53 KG/M2

## 2023-01-01 VITALS
HEART RATE: 77 BPM | RESPIRATION RATE: 16 BRPM | SYSTOLIC BLOOD PRESSURE: 107 MMHG | OXYGEN SATURATION: 99 % | WEIGHT: 98.6 LBS | TEMPERATURE: 97.5 F | BODY MASS INDEX: 19.26 KG/M2 | DIASTOLIC BLOOD PRESSURE: 68 MMHG

## 2023-01-01 VITALS
RESPIRATION RATE: 16 BRPM | OXYGEN SATURATION: 98 % | HEART RATE: 80 BPM | SYSTOLIC BLOOD PRESSURE: 113 MMHG | DIASTOLIC BLOOD PRESSURE: 69 MMHG | TEMPERATURE: 98.1 F

## 2023-01-01 VITALS
HEART RATE: 71 BPM | TEMPERATURE: 98.2 F | OXYGEN SATURATION: 98 % | DIASTOLIC BLOOD PRESSURE: 80 MMHG | SYSTOLIC BLOOD PRESSURE: 161 MMHG | RESPIRATION RATE: 16 BRPM

## 2023-01-01 VITALS
BODY MASS INDEX: 18.63 KG/M2 | WEIGHT: 95.4 LBS | SYSTOLIC BLOOD PRESSURE: 110 MMHG | RESPIRATION RATE: 18 BRPM | HEART RATE: 96 BPM | TEMPERATURE: 98.4 F | DIASTOLIC BLOOD PRESSURE: 68 MMHG | OXYGEN SATURATION: 94 %

## 2023-01-01 VITALS
HEART RATE: 84 BPM | SYSTOLIC BLOOD PRESSURE: 108 MMHG | OXYGEN SATURATION: 99 % | DIASTOLIC BLOOD PRESSURE: 70 MMHG | TEMPERATURE: 97.6 F | RESPIRATION RATE: 16 BRPM

## 2023-01-01 VITALS
TEMPERATURE: 98.8 F | WEIGHT: 99.1 LBS | OXYGEN SATURATION: 97 % | RESPIRATION RATE: 16 BRPM | SYSTOLIC BLOOD PRESSURE: 144 MMHG | HEART RATE: 81 BPM | BODY MASS INDEX: 18.72 KG/M2 | DIASTOLIC BLOOD PRESSURE: 79 MMHG

## 2023-01-01 VITALS
TEMPERATURE: 98.2 F | HEART RATE: 68 BPM | OXYGEN SATURATION: 99 % | SYSTOLIC BLOOD PRESSURE: 122 MMHG | DIASTOLIC BLOOD PRESSURE: 71 MMHG | RESPIRATION RATE: 16 BRPM

## 2023-01-01 VITALS
RESPIRATION RATE: 16 BRPM | HEART RATE: 70 BPM | DIASTOLIC BLOOD PRESSURE: 72 MMHG | TEMPERATURE: 98.3 F | OXYGEN SATURATION: 98 % | SYSTOLIC BLOOD PRESSURE: 113 MMHG

## 2023-01-01 VITALS
RESPIRATION RATE: 18 BRPM | OXYGEN SATURATION: 96 % | TEMPERATURE: 98.4 F | HEART RATE: 69 BPM | DIASTOLIC BLOOD PRESSURE: 64 MMHG | SYSTOLIC BLOOD PRESSURE: 109 MMHG

## 2023-01-01 VITALS
TEMPERATURE: 98.9 F | SYSTOLIC BLOOD PRESSURE: 132 MMHG | OXYGEN SATURATION: 96 % | RESPIRATION RATE: 16 BRPM | HEART RATE: 83 BPM | DIASTOLIC BLOOD PRESSURE: 71 MMHG

## 2023-01-01 VITALS
DIASTOLIC BLOOD PRESSURE: 82 MMHG | WEIGHT: 99 LBS | BODY MASS INDEX: 19.44 KG/M2 | SYSTOLIC BLOOD PRESSURE: 132 MMHG | HEIGHT: 60 IN

## 2023-01-01 VITALS
HEART RATE: 106 BPM | BODY MASS INDEX: 18.61 KG/M2 | OXYGEN SATURATION: 100 % | SYSTOLIC BLOOD PRESSURE: 107 MMHG | RESPIRATION RATE: 16 BRPM | TEMPERATURE: 97.5 F | DIASTOLIC BLOOD PRESSURE: 67 MMHG | WEIGHT: 98.5 LBS

## 2023-01-01 VITALS
TEMPERATURE: 98 F | DIASTOLIC BLOOD PRESSURE: 72 MMHG | SYSTOLIC BLOOD PRESSURE: 121 MMHG | RESPIRATION RATE: 18 BRPM | HEART RATE: 75 BPM | OXYGEN SATURATION: 98 %

## 2023-01-01 VITALS
TEMPERATURE: 98.8 F | DIASTOLIC BLOOD PRESSURE: 75 MMHG | RESPIRATION RATE: 16 BRPM | SYSTOLIC BLOOD PRESSURE: 133 MMHG | HEART RATE: 86 BPM | OXYGEN SATURATION: 100 %

## 2023-01-01 VITALS
BODY MASS INDEX: 19.65 KG/M2 | TEMPERATURE: 97.9 F | WEIGHT: 100.1 LBS | DIASTOLIC BLOOD PRESSURE: 69 MMHG | HEART RATE: 67 BPM | HEIGHT: 60 IN | SYSTOLIC BLOOD PRESSURE: 127 MMHG | RESPIRATION RATE: 18 BRPM | OXYGEN SATURATION: 96 %

## 2023-01-01 VITALS
RESPIRATION RATE: 14 BRPM | SYSTOLIC BLOOD PRESSURE: 112 MMHG | DIASTOLIC BLOOD PRESSURE: 69 MMHG | HEART RATE: 100 BPM | OXYGEN SATURATION: 98 %

## 2023-01-01 VITALS
HEART RATE: 76 BPM | TEMPERATURE: 98.5 F | SYSTOLIC BLOOD PRESSURE: 118 MMHG | DIASTOLIC BLOOD PRESSURE: 81 MMHG | OXYGEN SATURATION: 97 % | RESPIRATION RATE: 17 BRPM

## 2023-01-01 DIAGNOSIS — D46.22 RAEB-2 (REFRACTORY ANEMIA WITH EXCESS BLASTS-2) (H): ICD-10-CM

## 2023-01-01 DIAGNOSIS — D64.9 ANEMIA, UNSPECIFIED TYPE: ICD-10-CM

## 2023-01-01 DIAGNOSIS — D69.6 THROMBOCYTOPENIA (H): ICD-10-CM

## 2023-01-01 DIAGNOSIS — D64.9 ANEMIA: ICD-10-CM

## 2023-01-01 DIAGNOSIS — D46.22 RAEB-2 (REFRACTORY ANEMIA WITH EXCESS BLASTS-2) (H): Primary | Chronic | ICD-10-CM

## 2023-01-01 DIAGNOSIS — D64.9 ANEMIA, UNSPECIFIED TYPE: Primary | ICD-10-CM

## 2023-01-01 DIAGNOSIS — D70.9 NEUTROPENIA, UNSPECIFIED TYPE (H): ICD-10-CM

## 2023-01-01 DIAGNOSIS — D46.22 REFRACTORY ANEMIA WITH EXCESS BLASTS-2 (H): ICD-10-CM

## 2023-01-01 DIAGNOSIS — D46.22 RAEB-2 (REFRACTORY ANEMIA WITH EXCESS BLASTS-2) (H): Primary | ICD-10-CM

## 2023-01-01 DIAGNOSIS — M20.42 HAMMER TOE OF LEFT FOOT: ICD-10-CM

## 2023-01-01 DIAGNOSIS — D61.818 PANCYTOPENIA (H): Primary | ICD-10-CM

## 2023-01-01 DIAGNOSIS — N18.4 CKD (CHRONIC KIDNEY DISEASE) STAGE 4, GFR 15-29 ML/MIN (H): ICD-10-CM

## 2023-01-01 DIAGNOSIS — D61.818 PANCYTOPENIA (H): ICD-10-CM

## 2023-01-01 DIAGNOSIS — D61.3 IDIOPATHIC APLASTIC ANEMIA (H): ICD-10-CM

## 2023-01-01 DIAGNOSIS — R63.4 WEIGHT LOSS, UNINTENTIONAL: ICD-10-CM

## 2023-01-01 DIAGNOSIS — N30.01 ACUTE CYSTITIS WITH HEMATURIA: ICD-10-CM

## 2023-01-01 DIAGNOSIS — M79.675 TOE PAIN, LEFT: Primary | ICD-10-CM

## 2023-01-01 DIAGNOSIS — D46.22 RAEB-2 (REFRACTORY ANEMIA WITH EXCESS BLASTS-2) (H): Chronic | ICD-10-CM

## 2023-01-01 DIAGNOSIS — R30.0 DYSURIA: ICD-10-CM

## 2023-01-01 DIAGNOSIS — L97.521 ULCER OF TOE OF LEFT FOOT, LIMITED TO BREAKDOWN OF SKIN (H): ICD-10-CM

## 2023-01-01 DIAGNOSIS — D61.3 IDIOPATHIC APLASTIC ANEMIA (H): Primary | ICD-10-CM

## 2023-01-01 DIAGNOSIS — E55.9 VITAMIN D DEFICIENCY: ICD-10-CM

## 2023-01-01 DIAGNOSIS — E86.0 DEHYDRATION: ICD-10-CM

## 2023-01-01 DIAGNOSIS — D61.9 APLASTIC ANEMIA (H): ICD-10-CM

## 2023-01-01 DIAGNOSIS — D61.9 APLASTIC ANEMIA (H): Primary | ICD-10-CM

## 2023-01-01 DIAGNOSIS — D46.22 REFRACTORY ANEMIA WITH EXCESS BLASTS-2 (H): Primary | ICD-10-CM

## 2023-01-01 DIAGNOSIS — H65.91 RIGHT SEROUS OTITIS MEDIA, UNSPECIFIED CHRONICITY: ICD-10-CM

## 2023-01-01 DIAGNOSIS — N30.01 ACUTE CYSTITIS WITH HEMATURIA: Primary | ICD-10-CM

## 2023-01-01 DIAGNOSIS — D64.9 ANEMIA: Primary | ICD-10-CM

## 2023-01-01 DIAGNOSIS — L84 CORN OF TOE: ICD-10-CM

## 2023-01-01 DIAGNOSIS — D70.8 OTHER NEUTROPENIA (H): ICD-10-CM

## 2023-01-01 DIAGNOSIS — N18.4 CKD (CHRONIC KIDNEY DISEASE) STAGE 4, GFR 15-29 ML/MIN (H): Primary | ICD-10-CM

## 2023-01-01 DIAGNOSIS — D69.6 THROMBOCYTOPENIA (H): Primary | ICD-10-CM

## 2023-01-01 DIAGNOSIS — R35.0 URINARY FREQUENCY: ICD-10-CM

## 2023-01-01 DIAGNOSIS — N20.1 URETERAL STONE: ICD-10-CM

## 2023-01-01 DIAGNOSIS — R10.9 ACUTE RIGHT FLANK PAIN: ICD-10-CM

## 2023-01-01 DIAGNOSIS — N17.9 AKI (ACUTE KIDNEY INJURY) (H): ICD-10-CM

## 2023-01-01 DIAGNOSIS — H90.3 SENSORINEURAL HEARING LOSS OF BOTH EARS: Primary | ICD-10-CM

## 2023-01-01 DIAGNOSIS — H72.93 PERFORATION OF TYMPANIC MEMBRANE, BILATERAL: ICD-10-CM

## 2023-01-01 DIAGNOSIS — D46.9 MDS (MYELODYSPLASTIC SYNDROME) (H): Primary | ICD-10-CM

## 2023-01-01 DIAGNOSIS — R09.02 HYPOXIA: ICD-10-CM

## 2023-01-01 DIAGNOSIS — K21.9 GASTROESOPHAGEAL REFLUX DISEASE WITHOUT ESOPHAGITIS: ICD-10-CM

## 2023-01-01 LAB
ABO/RH(D): NORMAL
ACANTHOCYTES BLD QL SMEAR: SLIGHT
ALBUMIN SERPL BCG-MCNC: 2.7 G/DL (ref 3.5–5.2)
ALBUMIN SERPL BCG-MCNC: 2.8 G/DL (ref 3.5–5.2)
ALBUMIN SERPL BCG-MCNC: 2.9 G/DL (ref 3.5–5.2)
ALBUMIN SERPL BCG-MCNC: 2.9 G/DL (ref 3.5–5.2)
ALBUMIN SERPL BCG-MCNC: 3 G/DL (ref 3.5–5.2)
ALBUMIN SERPL BCG-MCNC: 3.1 G/DL (ref 3.5–5.2)
ALBUMIN SERPL BCG-MCNC: 3.2 G/DL (ref 3.5–5.2)
ALBUMIN SERPL BCG-MCNC: 3.3 G/DL (ref 3.5–5.2)
ALBUMIN SERPL BCG-MCNC: 3.4 G/DL (ref 3.5–5.2)
ALBUMIN SERPL BCG-MCNC: 3.7 G/DL (ref 3.5–5.2)
ALBUMIN UR-MCNC: 10 MG/DL
ALBUMIN UR-MCNC: 200 MG/DL
ALBUMIN UR-MCNC: NEGATIVE MG/DL
ALP SERPL-CCNC: 103 U/L (ref 35–104)
ALP SERPL-CCNC: 105 U/L (ref 35–104)
ALP SERPL-CCNC: 107 U/L (ref 35–104)
ALP SERPL-CCNC: 110 U/L (ref 35–104)
ALP SERPL-CCNC: 112 U/L (ref 35–104)
ALP SERPL-CCNC: 113 U/L (ref 35–104)
ALP SERPL-CCNC: 116 U/L (ref 35–104)
ALP SERPL-CCNC: 116 U/L (ref 35–104)
ALP SERPL-CCNC: 136 U/L (ref 35–104)
ALP SERPL-CCNC: 136 U/L (ref 35–104)
ALP SERPL-CCNC: 72 U/L (ref 35–104)
ALP SERPL-CCNC: 76 U/L (ref 35–104)
ALP SERPL-CCNC: 80 U/L (ref 35–104)
ALP SERPL-CCNC: 82 U/L (ref 35–104)
ALP SERPL-CCNC: 84 U/L (ref 35–104)
ALP SERPL-CCNC: 87 U/L (ref 35–104)
ALP SERPL-CCNC: 87 U/L (ref 35–104)
ALP SERPL-CCNC: 88 U/L (ref 35–104)
ALP SERPL-CCNC: 88 U/L (ref 35–104)
ALP SERPL-CCNC: 89 U/L (ref 35–104)
ALP SERPL-CCNC: 90 U/L (ref 35–104)
ALP SERPL-CCNC: 92 U/L (ref 35–104)
ALP SERPL-CCNC: 94 U/L (ref 35–104)
ALP SERPL-CCNC: 94 U/L (ref 35–104)
ALP SERPL-CCNC: 98 U/L (ref 35–104)
ALP SERPL-CCNC: 98 U/L (ref 35–104)
ALP SERPL-CCNC: 99 U/L (ref 35–104)
ALT SERPL W P-5'-P-CCNC: 10 U/L (ref 0–50)
ALT SERPL W P-5'-P-CCNC: 5 U/L (ref 0–50)
ALT SERPL W P-5'-P-CCNC: 5 U/L (ref 0–50)
ALT SERPL W P-5'-P-CCNC: 6 U/L (ref 0–50)
ALT SERPL W P-5'-P-CCNC: 7 U/L (ref 0–50)
ALT SERPL W P-5'-P-CCNC: 7 U/L (ref 0–50)
ALT SERPL W P-5'-P-CCNC: 8 U/L (ref 10–35)
ALT SERPL W P-5'-P-CCNC: 8 U/L (ref 10–35)
ALT SERPL W P-5'-P-CCNC: <5 U/L (ref 0–50)
ALT SERPL W P-5'-P-CCNC: <5 U/L (ref 10–35)
ALT SERPL W P-5'-P-CCNC: <5 U/L (ref 10–35)
ANION GAP SERPL CALCULATED.3IONS-SCNC: 10 MMOL/L (ref 7–15)
ANION GAP SERPL CALCULATED.3IONS-SCNC: 11 MMOL/L (ref 7–15)
ANION GAP SERPL CALCULATED.3IONS-SCNC: 12 MMOL/L (ref 7–15)
ANION GAP SERPL CALCULATED.3IONS-SCNC: 6 MMOL/L (ref 7–15)
ANION GAP SERPL CALCULATED.3IONS-SCNC: 6 MMOL/L (ref 7–15)
ANION GAP SERPL CALCULATED.3IONS-SCNC: 7 MMOL/L (ref 7–15)
ANION GAP SERPL CALCULATED.3IONS-SCNC: 8 MMOL/L (ref 7–15)
ANION GAP SERPL CALCULATED.3IONS-SCNC: 9 MMOL/L (ref 7–15)
ANTIBODY SCREEN: NEGATIVE
APPEARANCE UR: ABNORMAL
APPEARANCE UR: ABNORMAL
APPEARANCE UR: CLEAR
AST SERPL W P-5'-P-CCNC: 10 U/L (ref 0–45)
AST SERPL W P-5'-P-CCNC: 11 U/L (ref 0–45)
AST SERPL W P-5'-P-CCNC: 11 U/L (ref 10–35)
AST SERPL W P-5'-P-CCNC: 11 U/L (ref 10–35)
AST SERPL W P-5'-P-CCNC: 12 U/L (ref 0–45)
AST SERPL W P-5'-P-CCNC: 14 U/L (ref 0–45)
AST SERPL W P-5'-P-CCNC: 14 U/L (ref 10–35)
AST SERPL W P-5'-P-CCNC: 17 U/L (ref 0–45)
AST SERPL W P-5'-P-CCNC: 6 U/L (ref 0–45)
AST SERPL W P-5'-P-CCNC: 7 U/L (ref 0–45)
AST SERPL W P-5'-P-CCNC: 8 U/L (ref 0–45)
AST SERPL W P-5'-P-CCNC: 8 U/L (ref 0–45)
AST SERPL W P-5'-P-CCNC: 8 U/L (ref 10–35)
AST SERPL W P-5'-P-CCNC: 9 U/L (ref 0–45)
AST SERPL W P-5'-P-CCNC: 9 U/L (ref 0–45)
BACTERIA #/AREA URNS HPF: ABNORMAL /HPF
BACTERIA BLD CULT: NO GROWTH
BACTERIA UR CULT: ABNORMAL
BACTERIA UR CULT: ABNORMAL
BACTERIA UR CULT: NORMAL
BACTERIA UR CULT: NORMAL
BASO+EOS+MONOS # BLD AUTO: ABNORMAL 10*3/UL
BASO+EOS+MONOS NFR BLD AUTO: ABNORMAL %
BASOPHILS # BLD AUTO: 0 10E3/UL (ref 0–0.2)
BASOPHILS # BLD AUTO: ABNORMAL 10*3/UL
BASOPHILS # BLD MANUAL: 0 10E3/UL (ref 0–0.2)
BASOPHILS NFR BLD AUTO: 1 %
BASOPHILS NFR BLD AUTO: ABNORMAL %
BASOPHILS NFR BLD MANUAL: 0 %
BASOPHILS NFR BLD MANUAL: 1 %
BASOPHILS NFR BLD MANUAL: 2 %
BILIRUB SERPL-MCNC: 0.4 MG/DL
BILIRUB SERPL-MCNC: 0.5 MG/DL
BILIRUB SERPL-MCNC: 0.6 MG/DL
BILIRUB SERPL-MCNC: 0.7 MG/DL
BILIRUB SERPL-MCNC: 0.8 MG/DL
BILIRUB SERPL-MCNC: 1.1 MG/DL
BILIRUB SERPL-MCNC: 1.1 MG/DL
BILIRUB UR QL STRIP: NEGATIVE
BKR LAB AP TR RXN INTERPRETATION: NORMAL
BKR LAB AP TR RXN INTERPRETATION: NORMAL
BKR LAB AP TRAN RXN RECOMMENDATION: NORMAL
BKR LAB AP TRAN RXN RECOMMENDATION: NORMAL
BKR LAB AP TRANS SIGNS AND SX: NORMAL
BKR LAB AP TRANS SIGNS AND SX: NORMAL
BKR LAB AP TRANSFUSION REACTION: NORMAL
BKR LAB AP TRANSFUSION REACTION: NORMAL
BLASTS # BLD MANUAL: 0 10E3/UL
BLASTS # BLD MANUAL: 0.1 10E3/UL
BLASTS # BLD MANUAL: 0.2 10E3/UL
BLASTS # BLD MANUAL: 0.3 10E3/UL
BLASTS # BLD MANUAL: 0.4 10E3/UL
BLASTS # BLD MANUAL: 0.4 10E3/UL
BLASTS # BLD MANUAL: 0.5 10E3/UL
BLASTS # BLD MANUAL: 0.5 10E3/UL
BLASTS # BLD MANUAL: 0.6 10E3/UL
BLASTS # BLD MANUAL: 0.7 10E3/UL
BLASTS NFR BLD MANUAL: 1 %
BLASTS NFR BLD MANUAL: 10 %
BLASTS NFR BLD MANUAL: 10 %
BLASTS NFR BLD MANUAL: 11 %
BLASTS NFR BLD MANUAL: 13 %
BLASTS NFR BLD MANUAL: 15 %
BLASTS NFR BLD MANUAL: 16 %
BLASTS NFR BLD MANUAL: 17 %
BLASTS NFR BLD MANUAL: 17 %
BLASTS NFR BLD MANUAL: 2 %
BLASTS NFR BLD MANUAL: 21 %
BLASTS NFR BLD MANUAL: 3 %
BLASTS NFR BLD MANUAL: 4 %
BLASTS NFR BLD MANUAL: 5 %
BLASTS NFR BLD MANUAL: 6 %
BLASTS NFR BLD MANUAL: 7 %
BLASTS NFR BLD MANUAL: 8 %
BLASTS NFR BLD MANUAL: 8 %
BLD PROD TYP BPU: NORMAL
BLOOD COMPONENT TYPE: NORMAL
BUN SERPL-MCNC: 23.2 MG/DL (ref 8–23)
BUN SERPL-MCNC: 24.8 MG/DL (ref 8–23)
BUN SERPL-MCNC: 26.1 MG/DL (ref 8–23)
BUN SERPL-MCNC: 27.1 MG/DL (ref 8–23)
BUN SERPL-MCNC: 27.2 MG/DL (ref 8–23)
BUN SERPL-MCNC: 27.3 MG/DL (ref 8–23)
BUN SERPL-MCNC: 27.8 MG/DL (ref 8–23)
BUN SERPL-MCNC: 28.7 MG/DL (ref 8–23)
BUN SERPL-MCNC: 29.2 MG/DL (ref 8–23)
BUN SERPL-MCNC: 29.5 MG/DL (ref 8–23)
BUN SERPL-MCNC: 29.5 MG/DL (ref 8–23)
BUN SERPL-MCNC: 30 MG/DL (ref 8–23)
BUN SERPL-MCNC: 30.3 MG/DL (ref 8–23)
BUN SERPL-MCNC: 30.8 MG/DL (ref 8–23)
BUN SERPL-MCNC: 31.1 MG/DL (ref 8–23)
BUN SERPL-MCNC: 31.7 MG/DL (ref 8–23)
BUN SERPL-MCNC: 32.4 MG/DL (ref 8–23)
BUN SERPL-MCNC: 32.5 MG/DL (ref 8–23)
BUN SERPL-MCNC: 32.8 MG/DL (ref 8–23)
BUN SERPL-MCNC: 33.8 MG/DL (ref 8–23)
BUN SERPL-MCNC: 33.9 MG/DL (ref 8–23)
BUN SERPL-MCNC: 34.3 MG/DL (ref 8–23)
BUN SERPL-MCNC: 34.5 MG/DL (ref 8–23)
BUN SERPL-MCNC: 34.6 MG/DL (ref 8–23)
BUN SERPL-MCNC: 34.7 MG/DL (ref 8–23)
BUN SERPL-MCNC: 35 MG/DL (ref 8–23)
BUN SERPL-MCNC: 35.5 MG/DL (ref 8–23)
BUN SERPL-MCNC: 35.7 MG/DL (ref 8–23)
BUN SERPL-MCNC: 36.2 MG/DL (ref 8–23)
BUN SERPL-MCNC: 36.4 MG/DL (ref 8–23)
BUN SERPL-MCNC: 36.8 MG/DL (ref 8–23)
BUN SERPL-MCNC: 37.3 MG/DL (ref 8–23)
BUN SERPL-MCNC: 37.4 MG/DL (ref 8–23)
BUN SERPL-MCNC: 39.2 MG/DL (ref 8–23)
BUN SERPL-MCNC: 39.2 MG/DL (ref 8–23)
BUN SERPL-MCNC: 40.1 MG/DL (ref 8–23)
BUN SERPL-MCNC: 42 MG/DL (ref 8–23)
BUN SERPL-MCNC: 44.9 MG/DL (ref 8–23)
BUN SERPL-MCNC: 45 MG/DL (ref 8–23)
BUN SERPL-MCNC: 46.2 MG/DL (ref 8–23)
BUN SERPL-MCNC: 47.2 MG/DL (ref 8–23)
BURR CELLS BLD QL SMEAR: SLIGHT
CALCIUM SERPL-MCNC: 8.3 MG/DL (ref 8.8–10.2)
CALCIUM SERPL-MCNC: 8.7 MG/DL (ref 8.8–10.2)
CALCIUM SERPL-MCNC: 8.8 MG/DL (ref 8.8–10.2)
CALCIUM SERPL-MCNC: 8.9 MG/DL (ref 8.8–10.2)
CALCIUM SERPL-MCNC: 9 MG/DL (ref 8.8–10.2)
CALCIUM SERPL-MCNC: 9.1 MG/DL (ref 8.8–10.2)
CALCIUM SERPL-MCNC: 9.1 MG/DL (ref 8.8–10.2)
CALCIUM SERPL-MCNC: 9.2 MG/DL (ref 8.8–10.2)
CALCIUM SERPL-MCNC: 9.2 MG/DL (ref 8.8–10.2)
CALCIUM SERPL-MCNC: 9.3 MG/DL (ref 8.8–10.2)
CALCIUM SERPL-MCNC: 9.4 MG/DL (ref 8.8–10.2)
CALCIUM SERPL-MCNC: 9.5 MG/DL (ref 8.8–10.2)
CALCIUM SERPL-MCNC: 9.5 MG/DL (ref 8.8–10.2)
CALCIUM SERPL-MCNC: 9.6 MG/DL (ref 8.8–10.2)
CALCIUM SERPL-MCNC: 9.7 MG/DL (ref 8.8–10.2)
CALCIUM SERPL-MCNC: 9.7 MG/DL (ref 8.8–10.2)
CALCIUM SERPL-MCNC: 9.9 MG/DL (ref 8.8–10.2)
CALCIUM SERPL-MCNC: 9.9 MG/DL (ref 8.8–10.2)
CAOX CRY #/AREA URNS HPF: ABNORMAL /HPF
CHLORIDE SERPL-SCNC: 101 MMOL/L (ref 98–107)
CHLORIDE SERPL-SCNC: 102 MMOL/L (ref 98–107)
CHLORIDE SERPL-SCNC: 103 MMOL/L (ref 98–107)
CHLORIDE SERPL-SCNC: 104 MMOL/L (ref 98–107)
CHLORIDE SERPL-SCNC: 105 MMOL/L (ref 98–107)
CHLORIDE SERPL-SCNC: 106 MMOL/L (ref 98–107)
CHLORIDE SERPL-SCNC: 107 MMOL/L (ref 98–107)
CHLORIDE SERPL-SCNC: 108 MMOL/L (ref 98–107)
CHLORIDE SERPL-SCNC: 109 MMOL/L (ref 98–107)
CHLORIDE SERPL-SCNC: 109 MMOL/L (ref 98–107)
CO COMPONENTS: NORMAL
CODING SYSTEM: NORMAL
COLOR UR AUTO: ABNORMAL
COLOR UR AUTO: ABNORMAL
COLOR UR AUTO: YELLOW
CREAT SERPL-MCNC: 0.98 MG/DL (ref 0.51–0.95)
CREAT SERPL-MCNC: 0.98 MG/DL (ref 0.51–0.95)
CREAT SERPL-MCNC: 1 MG/DL (ref 0.51–0.95)
CREAT SERPL-MCNC: 1.01 MG/DL (ref 0.51–0.95)
CREAT SERPL-MCNC: 1.02 MG/DL (ref 0.51–0.95)
CREAT SERPL-MCNC: 1.02 MG/DL (ref 0.51–0.95)
CREAT SERPL-MCNC: 1.04 MG/DL (ref 0.51–0.95)
CREAT SERPL-MCNC: 1.04 MG/DL (ref 0.51–0.95)
CREAT SERPL-MCNC: 1.05 MG/DL (ref 0.51–0.95)
CREAT SERPL-MCNC: 1.05 MG/DL (ref 0.51–0.95)
CREAT SERPL-MCNC: 1.07 MG/DL (ref 0.51–0.95)
CREAT SERPL-MCNC: 1.08 MG/DL (ref 0.51–0.95)
CREAT SERPL-MCNC: 1.08 MG/DL (ref 0.51–0.95)
CREAT SERPL-MCNC: 1.09 MG/DL (ref 0.51–0.95)
CREAT SERPL-MCNC: 1.09 MG/DL (ref 0.51–0.95)
CREAT SERPL-MCNC: 1.1 MG/DL (ref 0.51–0.95)
CREAT SERPL-MCNC: 1.11 MG/DL (ref 0.51–0.95)
CREAT SERPL-MCNC: 1.11 MG/DL (ref 0.51–0.95)
CREAT SERPL-MCNC: 1.12 MG/DL (ref 0.51–0.95)
CREAT SERPL-MCNC: 1.12 MG/DL (ref 0.51–0.95)
CREAT SERPL-MCNC: 1.13 MG/DL (ref 0.51–0.95)
CREAT SERPL-MCNC: 1.14 MG/DL (ref 0.51–0.95)
CREAT SERPL-MCNC: 1.15 MG/DL (ref 0.51–0.95)
CREAT SERPL-MCNC: 1.15 MG/DL (ref 0.51–0.95)
CREAT SERPL-MCNC: 1.16 MG/DL (ref 0.51–0.95)
CREAT SERPL-MCNC: 1.17 MG/DL (ref 0.51–0.95)
CREAT SERPL-MCNC: 1.18 MG/DL (ref 0.51–0.95)
CREAT SERPL-MCNC: 1.2 MG/DL (ref 0.51–0.95)
CREAT SERPL-MCNC: 1.22 MG/DL (ref 0.51–0.95)
CREAT SERPL-MCNC: 1.26 MG/DL (ref 0.51–0.95)
CREAT SERPL-MCNC: 1.27 MG/DL (ref 0.51–0.95)
CREAT SERPL-MCNC: 1.29 MG/DL (ref 0.51–0.95)
CREAT SERPL-MCNC: 1.37 MG/DL (ref 0.51–0.95)
CREAT SERPL-MCNC: 1.64 MG/DL (ref 0.51–0.95)
CREAT SERPL-MCNC: 2.24 MG/DL (ref 0.51–0.95)
CREAT SERPL-MCNC: 2.31 MG/DL (ref 0.51–0.95)
CREAT SERPL-MCNC: 2.45 MG/DL (ref 0.51–0.95)
CROSSMATCH: NORMAL
DEPRECATED CALCIDIOL+CALCIFEROL SERPL-MC: 41 UG/L (ref 20–75)
DEPRECATED HCO3 PLAS-SCNC: 19 MMOL/L (ref 22–29)
DEPRECATED HCO3 PLAS-SCNC: 21 MMOL/L (ref 22–29)
DEPRECATED HCO3 PLAS-SCNC: 21 MMOL/L (ref 22–29)
DEPRECATED HCO3 PLAS-SCNC: 22 MMOL/L (ref 22–29)
DEPRECATED HCO3 PLAS-SCNC: 23 MMOL/L (ref 22–29)
DEPRECATED HCO3 PLAS-SCNC: 24 MMOL/L (ref 22–29)
DEPRECATED HCO3 PLAS-SCNC: 24 MMOL/L (ref 22–29)
DEPRECATED HCO3 PLAS-SCNC: 25 MMOL/L (ref 22–29)
DEPRECATED HCO3 PLAS-SCNC: 26 MMOL/L (ref 22–29)
DEPRECATED HCO3 PLAS-SCNC: 27 MMOL/L (ref 22–29)
DEPRECATED HCO3 PLAS-SCNC: 28 MMOL/L (ref 22–29)
DEPRECATED HCO3 PLAS-SCNC: 29 MMOL/L (ref 22–29)
EGFRCR SERPLBLD CKD-EPI 2021: 46 ML/MIN/1.73M2
EGFRCR SERPLBLD CKD-EPI 2021: 47 ML/MIN/1.73M2
EGFRCR SERPLBLD CKD-EPI 2021: 48 ML/MIN/1.73M2
EGFRCR SERPLBLD CKD-EPI 2021: 49 ML/MIN/1.73M2
EGFRCR SERPLBLD CKD-EPI 2021: 50 ML/MIN/1.73M2
EGFRCR SERPLBLD CKD-EPI 2021: 51 ML/MIN/1.73M2
EGFRCR SERPLBLD CKD-EPI 2021: 52 ML/MIN/1.73M2
EGFRCR SERPLBLD CKD-EPI 2021: 52 ML/MIN/1.73M2
EGFRCR SERPLBLD CKD-EPI 2021: 53 ML/MIN/1.73M2
EGFRCR SERPLBLD CKD-EPI 2021: 54 ML/MIN/1.73M2
EGFRCR SERPLBLD CKD-EPI 2021: 54 ML/MIN/1.73M2
EGFRCR SERPLBLD CKD-EPI 2021: 56 ML/MIN/1.73M2
EGFRCR SERPLBLD CKD-EPI 2021: 56 ML/MIN/1.73M2
ELLIPTOCYTES BLD QL SMEAR: SLIGHT
EOSINOPHIL # BLD AUTO: 0 10E3/UL (ref 0–0.7)
EOSINOPHIL # BLD AUTO: ABNORMAL 10*3/UL
EOSINOPHIL # BLD MANUAL: 0 10E3/UL (ref 0–0.7)
EOSINOPHIL NFR BLD AUTO: 2 %
EOSINOPHIL NFR BLD AUTO: ABNORMAL %
EOSINOPHIL NFR BLD MANUAL: 0 %
EOSINOPHIL NFR BLD MANUAL: 1 %
EOSINOPHIL NFR BLD MANUAL: 2 %
EOSINOPHIL NFR BLD MANUAL: 2 %
ERYTHROCYTE [DISTWIDTH] IN BLOOD BY AUTOMATED COUNT: 13.7 % (ref 10–15)
ERYTHROCYTE [DISTWIDTH] IN BLOOD BY AUTOMATED COUNT: 13.7 % (ref 10–15)
ERYTHROCYTE [DISTWIDTH] IN BLOOD BY AUTOMATED COUNT: 13.8 % (ref 10–15)
ERYTHROCYTE [DISTWIDTH] IN BLOOD BY AUTOMATED COUNT: 14.1 % (ref 10–15)
ERYTHROCYTE [DISTWIDTH] IN BLOOD BY AUTOMATED COUNT: 14.2 % (ref 10–15)
ERYTHROCYTE [DISTWIDTH] IN BLOOD BY AUTOMATED COUNT: 14.3 % (ref 10–15)
ERYTHROCYTE [DISTWIDTH] IN BLOOD BY AUTOMATED COUNT: 14.3 % (ref 10–15)
ERYTHROCYTE [DISTWIDTH] IN BLOOD BY AUTOMATED COUNT: 14.5 % (ref 10–15)
ERYTHROCYTE [DISTWIDTH] IN BLOOD BY AUTOMATED COUNT: 14.5 % (ref 10–15)
ERYTHROCYTE [DISTWIDTH] IN BLOOD BY AUTOMATED COUNT: 14.6 % (ref 10–15)
ERYTHROCYTE [DISTWIDTH] IN BLOOD BY AUTOMATED COUNT: 14.7 % (ref 10–15)
ERYTHROCYTE [DISTWIDTH] IN BLOOD BY AUTOMATED COUNT: 14.8 % (ref 10–15)
ERYTHROCYTE [DISTWIDTH] IN BLOOD BY AUTOMATED COUNT: 14.9 % (ref 10–15)
ERYTHROCYTE [DISTWIDTH] IN BLOOD BY AUTOMATED COUNT: 14.9 % (ref 10–15)
ERYTHROCYTE [DISTWIDTH] IN BLOOD BY AUTOMATED COUNT: 15 % (ref 10–15)
ERYTHROCYTE [DISTWIDTH] IN BLOOD BY AUTOMATED COUNT: 15 % (ref 10–15)
ERYTHROCYTE [DISTWIDTH] IN BLOOD BY AUTOMATED COUNT: 15.7 % (ref 10–15)
ERYTHROCYTE [DISTWIDTH] IN BLOOD BY AUTOMATED COUNT: 15.8 % (ref 10–15)
ERYTHROCYTE [DISTWIDTH] IN BLOOD BY AUTOMATED COUNT: 15.9 % (ref 10–15)
ERYTHROCYTE [DISTWIDTH] IN BLOOD BY AUTOMATED COUNT: 16 % (ref 10–15)
ERYTHROCYTE [DISTWIDTH] IN BLOOD BY AUTOMATED COUNT: 16.1 % (ref 10–15)
ERYTHROCYTE [DISTWIDTH] IN BLOOD BY AUTOMATED COUNT: 16.3 % (ref 10–15)
ERYTHROCYTE [DISTWIDTH] IN BLOOD BY AUTOMATED COUNT: 16.3 % (ref 10–15)
ERYTHROCYTE [DISTWIDTH] IN BLOOD BY AUTOMATED COUNT: 16.5 % (ref 10–15)
ERYTHROCYTE [DISTWIDTH] IN BLOOD BY AUTOMATED COUNT: 16.6 % (ref 10–15)
ERYTHROCYTE [DISTWIDTH] IN BLOOD BY AUTOMATED COUNT: 16.6 % (ref 10–15)
ERYTHROCYTE [DISTWIDTH] IN BLOOD BY AUTOMATED COUNT: 16.7 % (ref 10–15)
ERYTHROCYTE [DISTWIDTH] IN BLOOD BY AUTOMATED COUNT: 16.8 % (ref 10–15)
ERYTHROCYTE [DISTWIDTH] IN BLOOD BY AUTOMATED COUNT: 16.9 % (ref 10–15)
ERYTHROCYTE [DISTWIDTH] IN BLOOD BY AUTOMATED COUNT: 17 % (ref 10–15)
ERYTHROCYTE [DISTWIDTH] IN BLOOD BY AUTOMATED COUNT: 17.4 % (ref 10–15)
ERYTHROCYTE [DISTWIDTH] IN BLOOD BY AUTOMATED COUNT: 17.4 % (ref 10–15)
ERYTHROCYTE [DISTWIDTH] IN BLOOD BY AUTOMATED COUNT: 17.7 % (ref 10–15)
ERYTHROCYTE [DISTWIDTH] IN BLOOD BY AUTOMATED COUNT: 17.8 % (ref 10–15)
ERYTHROCYTE [DISTWIDTH] IN BLOOD BY AUTOMATED COUNT: 18.1 % (ref 10–15)
ERYTHROCYTE [DISTWIDTH] IN BLOOD BY AUTOMATED COUNT: 18.2 % (ref 10–15)
ERYTHROCYTE [DISTWIDTH] IN BLOOD BY AUTOMATED COUNT: 19.1 % (ref 10–15)
ERYTHROCYTE [DISTWIDTH] IN BLOOD BY AUTOMATED COUNT: 23.4 % (ref 10–15)
ERYTHROCYTE [DISTWIDTH] IN BLOOD BY AUTOMATED COUNT: 23.5 % (ref 10–15)
FLEXIBLE SIGMOIDOSCOPY: NORMAL
FRAGMENTS BLD QL SMEAR: SLIGHT
GFR SERPL CREATININE-BSD FRML MDRD: 19 ML/MIN/1.73M2
GFR SERPL CREATININE-BSD FRML MDRD: 21 ML/MIN/1.73M2
GFR SERPL CREATININE-BSD FRML MDRD: 22 ML/MIN/1.73M2
GFR SERPL CREATININE-BSD FRML MDRD: 31 ML/MIN/1.73M2
GFR SERPL CREATININE-BSD FRML MDRD: 39 ML/MIN/1.73M2
GFR SERPL CREATININE-BSD FRML MDRD: 42 ML/MIN/1.73M2
GFR SERPL CREATININE-BSD FRML MDRD: 43 ML/MIN/1.73M2
GFR SERPL CREATININE-BSD FRML MDRD: 43 ML/MIN/1.73M2
GFR SERPL CREATININE-BSD FRML MDRD: 45 ML/MIN/1.73M2
GFR SERPL CREATININE-BSD FRML MDRD: 47 ML/MIN/1.73M2
GFR SERPL CREATININE-BSD FRML MDRD: 48 ML/MIN/1.73M2
GFR SERPL CREATININE-BSD FRML MDRD: 48 ML/MIN/1.73M2
GFR SERPL CREATININE-BSD FRML MDRD: 49 ML/MIN/1.73M2
GFR SERPL CREATININE-BSD FRML MDRD: 50 ML/MIN/1.73M2
GFR SERPL CREATININE-BSD FRML MDRD: 51 ML/MIN/1.73M2
GFR SERPL CREATININE-BSD FRML MDRD: 51 ML/MIN/1.73M2
GFR SERPL CREATININE-BSD FRML MDRD: 53 ML/MIN/1.73M2
GFR SERPL CREATININE-BSD FRML MDRD: 53 ML/MIN/1.73M2
GFR SERPL CREATININE-BSD FRML MDRD: 54 ML/MIN/1.73M2
GFR SERPL CREATININE-BSD FRML MDRD: 54 ML/MIN/1.73M2
GFR SERPL CREATININE-BSD FRML MDRD: 56 ML/MIN/1.73M2
GFR SERPL CREATININE-BSD FRML MDRD: 57 ML/MIN/1.73M2
GFR SERPL CREATININE-BSD FRML MDRD: 58 ML/MIN/1.73M2
GFR SERPL CREATININE-BSD FRML MDRD: 58 ML/MIN/1.73M2
GLUCOSE BLDC GLUCOMTR-MCNC: 124 MG/DL (ref 70–99)
GLUCOSE BLDC GLUCOMTR-MCNC: 90 MG/DL (ref 70–99)
GLUCOSE SERPL-MCNC: 101 MG/DL (ref 70–99)
GLUCOSE SERPL-MCNC: 102 MG/DL (ref 70–99)
GLUCOSE SERPL-MCNC: 103 MG/DL (ref 70–99)
GLUCOSE SERPL-MCNC: 104 MG/DL (ref 70–99)
GLUCOSE SERPL-MCNC: 107 MG/DL (ref 70–99)
GLUCOSE SERPL-MCNC: 108 MG/DL (ref 70–99)
GLUCOSE SERPL-MCNC: 108 MG/DL (ref 70–99)
GLUCOSE SERPL-MCNC: 109 MG/DL (ref 70–99)
GLUCOSE SERPL-MCNC: 111 MG/DL (ref 70–99)
GLUCOSE SERPL-MCNC: 112 MG/DL (ref 70–99)
GLUCOSE SERPL-MCNC: 113 MG/DL (ref 70–99)
GLUCOSE SERPL-MCNC: 113 MG/DL (ref 70–99)
GLUCOSE SERPL-MCNC: 117 MG/DL (ref 70–99)
GLUCOSE SERPL-MCNC: 118 MG/DL (ref 70–99)
GLUCOSE SERPL-MCNC: 119 MG/DL (ref 70–99)
GLUCOSE SERPL-MCNC: 120 MG/DL (ref 70–99)
GLUCOSE SERPL-MCNC: 120 MG/DL (ref 70–99)
GLUCOSE SERPL-MCNC: 121 MG/DL (ref 70–99)
GLUCOSE SERPL-MCNC: 122 MG/DL (ref 70–99)
GLUCOSE SERPL-MCNC: 123 MG/DL (ref 70–99)
GLUCOSE SERPL-MCNC: 124 MG/DL (ref 70–99)
GLUCOSE SERPL-MCNC: 124 MG/DL (ref 70–99)
GLUCOSE SERPL-MCNC: 125 MG/DL (ref 70–99)
GLUCOSE SERPL-MCNC: 128 MG/DL (ref 70–99)
GLUCOSE SERPL-MCNC: 136 MG/DL (ref 70–99)
GLUCOSE SERPL-MCNC: 137 MG/DL (ref 70–99)
GLUCOSE SERPL-MCNC: 139 MG/DL (ref 70–99)
GLUCOSE SERPL-MCNC: 140 MG/DL (ref 70–99)
GLUCOSE SERPL-MCNC: 95 MG/DL (ref 70–99)
GLUCOSE SERPL-MCNC: 96 MG/DL (ref 70–99)
GLUCOSE UR STRIP-MCNC: NEGATIVE MG/DL
GRAM STAIN RESULT: NORMAL
GRAM/CULTURE INDICATED?: YES
HCT VFR BLD AUTO: 20.1 % (ref 35–47)
HCT VFR BLD AUTO: 20.1 % (ref 35–47)
HCT VFR BLD AUTO: 20.2 % (ref 35–47)
HCT VFR BLD AUTO: 20.3 % (ref 35–47)
HCT VFR BLD AUTO: 20.4 % (ref 35–47)
HCT VFR BLD AUTO: 20.6 % (ref 35–47)
HCT VFR BLD AUTO: 20.7 % (ref 35–47)
HCT VFR BLD AUTO: 20.7 % (ref 35–47)
HCT VFR BLD AUTO: 21.5 % (ref 35–47)
HCT VFR BLD AUTO: 21.5 % (ref 35–47)
HCT VFR BLD AUTO: 22.3 % (ref 35–47)
HCT VFR BLD AUTO: 22.3 % (ref 35–47)
HCT VFR BLD AUTO: 22.5 % (ref 35–47)
HCT VFR BLD AUTO: 22.6 % (ref 35–47)
HCT VFR BLD AUTO: 22.6 % (ref 35–47)
HCT VFR BLD AUTO: 22.8 % (ref 35–47)
HCT VFR BLD AUTO: 22.9 % (ref 35–47)
HCT VFR BLD AUTO: 23.5 % (ref 35–47)
HCT VFR BLD AUTO: 23.5 % (ref 35–47)
HCT VFR BLD AUTO: 23.6 % (ref 35–47)
HCT VFR BLD AUTO: 23.7 % (ref 35–47)
HCT VFR BLD AUTO: 23.9 % (ref 35–47)
HCT VFR BLD AUTO: 24.3 % (ref 35–47)
HCT VFR BLD AUTO: 24.4 % (ref 35–47)
HCT VFR BLD AUTO: 24.4 % (ref 35–47)
HCT VFR BLD AUTO: 24.6 % (ref 35–47)
HCT VFR BLD AUTO: 24.8 % (ref 35–47)
HCT VFR BLD AUTO: 25 % (ref 35–47)
HCT VFR BLD AUTO: 25.2 % (ref 35–47)
HCT VFR BLD AUTO: 25.3 % (ref 35–47)
HCT VFR BLD AUTO: 25.4 % (ref 35–47)
HCT VFR BLD AUTO: 25.6 % (ref 35–47)
HCT VFR BLD AUTO: 25.7 % (ref 35–47)
HCT VFR BLD AUTO: 25.7 % (ref 35–47)
HCT VFR BLD AUTO: 25.8 % (ref 35–47)
HCT VFR BLD AUTO: 25.8 % (ref 35–47)
HCT VFR BLD AUTO: 26 % (ref 35–47)
HCT VFR BLD AUTO: 26.3 % (ref 35–47)
HCT VFR BLD AUTO: 26.3 % (ref 35–47)
HCT VFR BLD AUTO: 26.6 % (ref 35–47)
HCT VFR BLD AUTO: 26.7 % (ref 35–47)
HCT VFR BLD AUTO: 27.2 % (ref 35–47)
HCT VFR BLD AUTO: 27.3 % (ref 35–47)
HCT VFR BLD AUTO: 27.4 % (ref 35–47)
HCT VFR BLD AUTO: 28 % (ref 35–47)
HCT VFR BLD AUTO: 28.1 % (ref 35–47)
HCT VFR BLD AUTO: 28.3 % (ref 35–47)
HCT VFR BLD AUTO: 28.4 % (ref 35–47)
HCT VFR BLD AUTO: 29.1 % (ref 35–47)
HCT VFR BLD AUTO: 29.6 % (ref 35–47)
HGB BLD-MCNC: 6.2 G/DL (ref 11.7–15.7)
HGB BLD-MCNC: 6.3 G/DL (ref 11.7–15.7)
HGB BLD-MCNC: 6.4 G/DL (ref 11.7–15.7)
HGB BLD-MCNC: 6.4 G/DL (ref 11.7–15.7)
HGB BLD-MCNC: 6.5 G/DL (ref 11.7–15.7)
HGB BLD-MCNC: 6.5 G/DL (ref 11.7–15.7)
HGB BLD-MCNC: 6.7 G/DL (ref 11.7–15.7)
HGB BLD-MCNC: 6.8 G/DL (ref 11.7–15.7)
HGB BLD-MCNC: 6.8 G/DL (ref 11.7–15.7)
HGB BLD-MCNC: 7 G/DL (ref 11.7–15.7)
HGB BLD-MCNC: 7 G/DL (ref 11.7–15.7)
HGB BLD-MCNC: 7.1 G/DL (ref 11.7–15.7)
HGB BLD-MCNC: 7.2 G/DL (ref 11.7–15.7)
HGB BLD-MCNC: 7.3 G/DL (ref 11.7–15.7)
HGB BLD-MCNC: 7.4 G/DL (ref 11.7–15.7)
HGB BLD-MCNC: 7.4 G/DL (ref 11.7–15.7)
HGB BLD-MCNC: 7.5 G/DL (ref 11.7–15.7)
HGB BLD-MCNC: 7.6 G/DL (ref 11.7–15.7)
HGB BLD-MCNC: 7.7 G/DL (ref 11.7–15.7)
HGB BLD-MCNC: 7.8 G/DL (ref 11.7–15.7)
HGB BLD-MCNC: 7.8 G/DL (ref 11.7–15.7)
HGB BLD-MCNC: 7.9 G/DL (ref 11.7–15.7)
HGB BLD-MCNC: 8 G/DL (ref 11.7–15.7)
HGB BLD-MCNC: 8.1 G/DL (ref 11.7–15.7)
HGB BLD-MCNC: 8.2 G/DL (ref 11.7–15.7)
HGB BLD-MCNC: 8.3 G/DL (ref 11.7–15.7)
HGB BLD-MCNC: 8.4 G/DL (ref 11.7–15.7)
HGB BLD-MCNC: 8.5 G/DL (ref 11.7–15.7)
HGB BLD-MCNC: 8.6 G/DL (ref 11.7–15.7)
HGB BLD-MCNC: 8.7 G/DL (ref 11.7–15.7)
HGB BLD-MCNC: 8.7 G/DL (ref 11.7–15.7)
HGB BLD-MCNC: 8.9 G/DL (ref 11.7–15.7)
HGB BLD-MCNC: 8.9 G/DL (ref 11.7–15.7)
HGB BLD-MCNC: 9 G/DL (ref 11.7–15.7)
HGB BLD-MCNC: 9.1 G/DL (ref 11.7–15.7)
HGB BLD-MCNC: 9.2 G/DL (ref 11.7–15.7)
HGB BLD-MCNC: 9.5 G/DL (ref 11.7–15.7)
HGB UR QL STRIP: ABNORMAL
HOLD SPECIMEN: NORMAL
HYALINE CASTS: 3 /LPF
IMM GRANULOCYTES # BLD: 0 10E3/UL
IMM GRANULOCYTES # BLD: ABNORMAL 10*3/UL
IMM GRANULOCYTES NFR BLD: 2 %
IMM GRANULOCYTES NFR BLD: ABNORMAL %
IRON BINDING CAPACITY (ROCHE): NORMAL
IRON SATN MFR SERPL: NORMAL %
IRON SERPL-MCNC: 113 UG/DL (ref 37–145)
ISSUE DATE AND TIME: NORMAL
KETONES UR STRIP-MCNC: NEGATIVE MG/DL
LACTATE SERPL-SCNC: 0.9 MMOL/L (ref 0.7–2)
LACTATE SERPL-SCNC: 1.4 MMOL/L (ref 0.7–2)
LDH SERPL L TO P-CCNC: 166 U/L (ref 0–250)
LDH SERPL L TO P-CCNC: 210 U/L (ref 0–250)
LEUKOCYTE ESTERASE UR QL STRIP: ABNORMAL
LYMPHOCYTES # BLD AUTO: 0.9 10E3/UL (ref 0.8–5.3)
LYMPHOCYTES # BLD AUTO: ABNORMAL 10*3/UL
LYMPHOCYTES # BLD MANUAL: 0.5 10E3/UL (ref 0.8–5.3)
LYMPHOCYTES # BLD MANUAL: 0.5 10E3/UL (ref 0.8–5.3)
LYMPHOCYTES # BLD MANUAL: 0.6 10E3/UL (ref 0.8–5.3)
LYMPHOCYTES # BLD MANUAL: 0.6 10E3/UL (ref 0.8–5.3)
LYMPHOCYTES # BLD MANUAL: 0.7 10E3/UL (ref 0.8–5.3)
LYMPHOCYTES # BLD MANUAL: 0.8 10E3/UL (ref 0.8–5.3)
LYMPHOCYTES # BLD MANUAL: 0.9 10E3/UL (ref 0.8–5.3)
LYMPHOCYTES # BLD MANUAL: 1 10E3/UL (ref 0.8–5.3)
LYMPHOCYTES # BLD MANUAL: 1.1 10E3/UL (ref 0.8–5.3)
LYMPHOCYTES # BLD MANUAL: 1.2 10E3/UL (ref 0.8–5.3)
LYMPHOCYTES # BLD MANUAL: 1.3 10E3/UL (ref 0.8–5.3)
LYMPHOCYTES # BLD MANUAL: 1.4 10E3/UL (ref 0.8–5.3)
LYMPHOCYTES # BLD MANUAL: 1.4 10E3/UL (ref 0.8–5.3)
LYMPHOCYTES # BLD MANUAL: 1.5 10E3/UL (ref 0.8–5.3)
LYMPHOCYTES NFR BLD AUTO: 52 %
LYMPHOCYTES NFR BLD AUTO: ABNORMAL %
LYMPHOCYTES NFR BLD MANUAL: 16 %
LYMPHOCYTES NFR BLD MANUAL: 20 %
LYMPHOCYTES NFR BLD MANUAL: 27 %
LYMPHOCYTES NFR BLD MANUAL: 28 %
LYMPHOCYTES NFR BLD MANUAL: 31 %
LYMPHOCYTES NFR BLD MANUAL: 32 %
LYMPHOCYTES NFR BLD MANUAL: 32 %
LYMPHOCYTES NFR BLD MANUAL: 33 %
LYMPHOCYTES NFR BLD MANUAL: 35 %
LYMPHOCYTES NFR BLD MANUAL: 35 %
LYMPHOCYTES NFR BLD MANUAL: 36 %
LYMPHOCYTES NFR BLD MANUAL: 37 %
LYMPHOCYTES NFR BLD MANUAL: 39 %
LYMPHOCYTES NFR BLD MANUAL: 40 %
LYMPHOCYTES NFR BLD MANUAL: 42 %
LYMPHOCYTES NFR BLD MANUAL: 42 %
LYMPHOCYTES NFR BLD MANUAL: 43 %
LYMPHOCYTES NFR BLD MANUAL: 43 %
LYMPHOCYTES NFR BLD MANUAL: 45 %
LYMPHOCYTES NFR BLD MANUAL: 46 %
LYMPHOCYTES NFR BLD MANUAL: 47 %
LYMPHOCYTES NFR BLD MANUAL: 49 %
LYMPHOCYTES NFR BLD MANUAL: 50 %
LYMPHOCYTES NFR BLD MANUAL: 51 %
LYMPHOCYTES NFR BLD MANUAL: 52 %
LYMPHOCYTES NFR BLD MANUAL: 52 %
LYMPHOCYTES NFR BLD MANUAL: 55 %
LYMPHOCYTES NFR BLD MANUAL: 56 %
LYMPHOCYTES NFR BLD MANUAL: 56 %
LYMPHOCYTES NFR BLD MANUAL: 59 %
LYMPHOCYTES NFR BLD MANUAL: 59 %
LYMPHOCYTES NFR BLD MANUAL: 61 %
LYMPHOCYTES NFR BLD MANUAL: 62 %
LYMPHOCYTES NFR BLD MANUAL: 62 %
LYMPHOCYTES NFR BLD MANUAL: 63 %
LYMPHOCYTES NFR BLD MANUAL: 68 %
LYMPHOCYTES NFR BLD MANUAL: 69 %
LYMPHOCYTES NFR BLD MANUAL: 70 %
LYMPHOCYTES NFR BLD MANUAL: 70 %
LYMPHOCYTES NFR BLD MANUAL: 72 %
LYMPHOCYTES NFR BLD MANUAL: 72 %
LYMPHOCYTES NFR BLD MANUAL: 76 %
LYMPHOCYTES NFR BLD MANUAL: 78 %
LYMPHOCYTES NFR BLD MANUAL: 78 %
LYMPHOCYTES NFR BLD MANUAL: 87 %
MAGNESIUM SERPL-MCNC: 1.4 MG/DL (ref 1.7–2.3)
MAGNESIUM SERPL-MCNC: 1.5 MG/DL (ref 1.7–2.3)
MAGNESIUM SERPL-MCNC: 1.6 MG/DL (ref 1.7–2.3)
MAGNESIUM SERPL-MCNC: 1.7 MG/DL (ref 1.7–2.3)
MAGNESIUM SERPL-MCNC: 2 MG/DL (ref 1.7–2.3)
MAGNESIUM SERPL-MCNC: 2.2 MG/DL (ref 1.7–2.3)
MCH RBC QN AUTO: 27.2 PG (ref 26.5–33)
MCH RBC QN AUTO: 27.3 PG (ref 26.5–33)
MCH RBC QN AUTO: 27.4 PG (ref 26.5–33)
MCH RBC QN AUTO: 27.6 PG (ref 26.5–33)
MCH RBC QN AUTO: 27.8 PG (ref 26.5–33)
MCH RBC QN AUTO: 27.9 PG (ref 26.5–33)
MCH RBC QN AUTO: 28.2 PG (ref 26.5–33)
MCH RBC QN AUTO: 28.6 PG (ref 26.5–33)
MCH RBC QN AUTO: 28.6 PG (ref 26.5–33)
MCH RBC QN AUTO: 28.7 PG (ref 26.5–33)
MCH RBC QN AUTO: 28.8 PG (ref 26.5–33)
MCH RBC QN AUTO: 28.8 PG (ref 26.5–33)
MCH RBC QN AUTO: 28.9 PG (ref 26.5–33)
MCH RBC QN AUTO: 29 PG (ref 26.5–33)
MCH RBC QN AUTO: 29.1 PG (ref 26.5–33)
MCH RBC QN AUTO: 29.2 PG (ref 26.5–33)
MCH RBC QN AUTO: 29.3 PG (ref 26.5–33)
MCH RBC QN AUTO: 29.4 PG (ref 26.5–33)
MCH RBC QN AUTO: 29.5 PG (ref 26.5–33)
MCH RBC QN AUTO: 29.6 PG (ref 26.5–33)
MCH RBC QN AUTO: 29.6 PG (ref 26.5–33)
MCH RBC QN AUTO: 29.7 PG (ref 26.5–33)
MCH RBC QN AUTO: 29.8 PG (ref 26.5–33)
MCH RBC QN AUTO: 29.8 PG (ref 26.5–33)
MCH RBC QN AUTO: 29.9 PG (ref 26.5–33)
MCH RBC QN AUTO: 30 PG (ref 26.5–33)
MCH RBC QN AUTO: 30 PG (ref 26.5–33)
MCH RBC QN AUTO: 30.1 PG (ref 26.5–33)
MCH RBC QN AUTO: 30.2 PG (ref 26.5–33)
MCH RBC QN AUTO: 30.2 PG (ref 26.5–33)
MCH RBC QN AUTO: 30.3 PG (ref 26.5–33)
MCH RBC QN AUTO: 30.3 PG (ref 26.5–33)
MCH RBC QN AUTO: 30.4 PG (ref 26.5–33)
MCH RBC QN AUTO: 30.5 PG (ref 26.5–33)
MCH RBC QN AUTO: 30.8 PG (ref 26.5–33)
MCH RBC QN AUTO: 30.8 PG (ref 26.5–33)
MCH RBC QN AUTO: 30.9 PG (ref 26.5–33)
MCH RBC QN AUTO: 31 PG (ref 26.5–33)
MCH RBC QN AUTO: 31 PG (ref 26.5–33)
MCHC RBC AUTO-ENTMCNC: 29.7 G/DL (ref 31.5–36.5)
MCHC RBC AUTO-ENTMCNC: 30.1 G/DL (ref 31.5–36.5)
MCHC RBC AUTO-ENTMCNC: 30.1 G/DL (ref 31.5–36.5)
MCHC RBC AUTO-ENTMCNC: 30.2 G/DL (ref 31.5–36.5)
MCHC RBC AUTO-ENTMCNC: 30.2 G/DL (ref 31.5–36.5)
MCHC RBC AUTO-ENTMCNC: 30.4 G/DL (ref 31.5–36.5)
MCHC RBC AUTO-ENTMCNC: 30.4 G/DL (ref 31.5–36.5)
MCHC RBC AUTO-ENTMCNC: 30.5 G/DL (ref 31.5–36.5)
MCHC RBC AUTO-ENTMCNC: 30.6 G/DL (ref 31.5–36.5)
MCHC RBC AUTO-ENTMCNC: 30.6 G/DL (ref 31.5–36.5)
MCHC RBC AUTO-ENTMCNC: 30.7 G/DL (ref 31.5–36.5)
MCHC RBC AUTO-ENTMCNC: 30.8 G/DL (ref 31.5–36.5)
MCHC RBC AUTO-ENTMCNC: 30.9 G/DL (ref 31.5–36.5)
MCHC RBC AUTO-ENTMCNC: 31 G/DL (ref 31.5–36.5)
MCHC RBC AUTO-ENTMCNC: 31 G/DL (ref 31.5–36.5)
MCHC RBC AUTO-ENTMCNC: 31.1 G/DL (ref 31.5–36.5)
MCHC RBC AUTO-ENTMCNC: 31.1 G/DL (ref 31.5–36.5)
MCHC RBC AUTO-ENTMCNC: 31.2 G/DL (ref 31.5–36.5)
MCHC RBC AUTO-ENTMCNC: 31.3 G/DL (ref 31.5–36.5)
MCHC RBC AUTO-ENTMCNC: 31.3 G/DL (ref 31.5–36.5)
MCHC RBC AUTO-ENTMCNC: 31.4 G/DL (ref 31.5–36.5)
MCHC RBC AUTO-ENTMCNC: 31.5 G/DL (ref 31.5–36.5)
MCHC RBC AUTO-ENTMCNC: 31.6 G/DL (ref 31.5–36.5)
MCHC RBC AUTO-ENTMCNC: 31.7 G/DL (ref 31.5–36.5)
MCHC RBC AUTO-ENTMCNC: 31.7 G/DL (ref 31.5–36.5)
MCHC RBC AUTO-ENTMCNC: 31.8 G/DL (ref 31.5–36.5)
MCHC RBC AUTO-ENTMCNC: 31.9 G/DL (ref 31.5–36.5)
MCHC RBC AUTO-ENTMCNC: 32 G/DL (ref 31.5–36.5)
MCHC RBC AUTO-ENTMCNC: 32 G/DL (ref 31.5–36.5)
MCHC RBC AUTO-ENTMCNC: 32.1 G/DL (ref 31.5–36.5)
MCHC RBC AUTO-ENTMCNC: 32.2 G/DL (ref 31.5–36.5)
MCHC RBC AUTO-ENTMCNC: 32.3 G/DL (ref 31.5–36.5)
MCHC RBC AUTO-ENTMCNC: 32.5 G/DL (ref 31.5–36.5)
MCHC RBC AUTO-ENTMCNC: 32.5 G/DL (ref 31.5–36.5)
MCHC RBC AUTO-ENTMCNC: 32.6 G/DL (ref 31.5–36.5)
MCHC RBC AUTO-ENTMCNC: 32.7 G/DL (ref 31.5–36.5)
MCHC RBC AUTO-ENTMCNC: 33.1 G/DL (ref 31.5–36.5)
MCV RBC AUTO: 103 FL (ref 78–100)
MCV RBC AUTO: 104 FL (ref 78–100)
MCV RBC AUTO: 88 FL (ref 78–100)
MCV RBC AUTO: 89 FL (ref 78–100)
MCV RBC AUTO: 90 FL (ref 78–100)
MCV RBC AUTO: 91 FL (ref 78–100)
MCV RBC AUTO: 92 FL (ref 78–100)
MCV RBC AUTO: 93 FL (ref 78–100)
MCV RBC AUTO: 94 FL (ref 78–100)
MCV RBC AUTO: 95 FL (ref 78–100)
MCV RBC AUTO: 96 FL (ref 78–100)
MCV RBC AUTO: 96 FL (ref 78–100)
MCV RBC AUTO: 97 FL (ref 78–100)
MCV RBC AUTO: 98 FL (ref 78–100)
METAMYELOCYTES # BLD MANUAL: 0 10E3/UL
METAMYELOCYTES NFR BLD MANUAL: 1 %
MONOCYTES # BLD AUTO: 0.6 10E3/UL (ref 0–1.3)
MONOCYTES # BLD AUTO: ABNORMAL 10*3/UL
MONOCYTES # BLD MANUAL: 0.1 10E3/UL (ref 0–1.3)
MONOCYTES # BLD MANUAL: 0.2 10E3/UL (ref 0–1.3)
MONOCYTES # BLD MANUAL: 0.3 10E3/UL (ref 0–1.3)
MONOCYTES # BLD MANUAL: 0.4 10E3/UL (ref 0–1.3)
MONOCYTES # BLD MANUAL: 0.5 10E3/UL (ref 0–1.3)
MONOCYTES # BLD MANUAL: 0.5 10E3/UL (ref 0–1.3)
MONOCYTES # BLD MANUAL: 0.6 10E3/UL (ref 0–1.3)
MONOCYTES # BLD MANUAL: 0.7 10E3/UL (ref 0–1.3)
MONOCYTES # BLD MANUAL: 0.8 10E3/UL (ref 0–1.3)
MONOCYTES # BLD MANUAL: 0.9 10E3/UL (ref 0–1.3)
MONOCYTES # BLD MANUAL: 0.9 10E3/UL (ref 0–1.3)
MONOCYTES # BLD MANUAL: 1 10E3/UL (ref 0–1.3)
MONOCYTES # BLD MANUAL: 1.1 10E3/UL (ref 0–1.3)
MONOCYTES # BLD MANUAL: 1.1 10E3/UL (ref 0–1.3)
MONOCYTES # BLD MANUAL: 1.3 10E3/UL (ref 0–1.3)
MONOCYTES # BLD MANUAL: 1.5 10E3/UL (ref 0–1.3)
MONOCYTES # BLD MANUAL: 1.7 10E3/UL (ref 0–1.3)
MONOCYTES NFR BLD AUTO: 32 %
MONOCYTES NFR BLD AUTO: ABNORMAL %
MONOCYTES NFR BLD MANUAL: 10 %
MONOCYTES NFR BLD MANUAL: 11 %
MONOCYTES NFR BLD MANUAL: 12 %
MONOCYTES NFR BLD MANUAL: 13 %
MONOCYTES NFR BLD MANUAL: 13 %
MONOCYTES NFR BLD MANUAL: 14 %
MONOCYTES NFR BLD MANUAL: 15 %
MONOCYTES NFR BLD MANUAL: 16 %
MONOCYTES NFR BLD MANUAL: 17 %
MONOCYTES NFR BLD MANUAL: 17 %
MONOCYTES NFR BLD MANUAL: 18 %
MONOCYTES NFR BLD MANUAL: 19 %
MONOCYTES NFR BLD MANUAL: 21 %
MONOCYTES NFR BLD MANUAL: 22 %
MONOCYTES NFR BLD MANUAL: 22 %
MONOCYTES NFR BLD MANUAL: 23 %
MONOCYTES NFR BLD MANUAL: 24 %
MONOCYTES NFR BLD MANUAL: 24 %
MONOCYTES NFR BLD MANUAL: 25 %
MONOCYTES NFR BLD MANUAL: 26 %
MONOCYTES NFR BLD MANUAL: 27 %
MONOCYTES NFR BLD MANUAL: 28 %
MONOCYTES NFR BLD MANUAL: 29 %
MONOCYTES NFR BLD MANUAL: 30 %
MONOCYTES NFR BLD MANUAL: 30 %
MONOCYTES NFR BLD MANUAL: 31 %
MONOCYTES NFR BLD MANUAL: 33 %
MONOCYTES NFR BLD MANUAL: 37 %
MONOCYTES NFR BLD MANUAL: 41 %
MONOCYTES NFR BLD MANUAL: 5 %
MONOCYTES NFR BLD MANUAL: 6 %
MONOCYTES NFR BLD MANUAL: 7 %
MONOCYTES NFR BLD MANUAL: 8 %
MUCOUS THREADS #/AREA URNS LPF: PRESENT /LPF
NEUTROPHILS # BLD AUTO: 0.2 10E3/UL (ref 1.6–8.3)
NEUTROPHILS # BLD AUTO: ABNORMAL 10*3/UL
NEUTROPHILS # BLD MANUAL: 0.1 10E3/UL (ref 1.6–8.3)
NEUTROPHILS # BLD MANUAL: 0.2 10E3/UL (ref 1.6–8.3)
NEUTROPHILS # BLD MANUAL: 0.3 10E3/UL (ref 1.6–8.3)
NEUTROPHILS # BLD MANUAL: 0.4 10E3/UL (ref 1.6–8.3)
NEUTROPHILS # BLD MANUAL: 0.5 10E3/UL (ref 1.6–8.3)
NEUTROPHILS # BLD MANUAL: 0.6 10E3/UL (ref 1.6–8.3)
NEUTROPHILS # BLD MANUAL: 0.8 10E3/UL (ref 1.6–8.3)
NEUTROPHILS # BLD MANUAL: 0.8 10E3/UL (ref 1.6–8.3)
NEUTROPHILS # BLD MANUAL: 0.9 10E3/UL (ref 1.6–8.3)
NEUTROPHILS # BLD MANUAL: 0.9 10E3/UL (ref 1.6–8.3)
NEUTROPHILS # BLD MANUAL: 1 10E3/UL (ref 1.6–8.3)
NEUTROPHILS # BLD MANUAL: 1.1 10E3/UL (ref 1.6–8.3)
NEUTROPHILS # BLD MANUAL: 1.1 10E3/UL (ref 1.6–8.3)
NEUTROPHILS # BLD MANUAL: 1.2 10E3/UL (ref 1.6–8.3)
NEUTROPHILS # BLD MANUAL: 1.2 10E3/UL (ref 1.6–8.3)
NEUTROPHILS # BLD MANUAL: 1.3 10E3/UL (ref 1.6–8.3)
NEUTROPHILS # BLD MANUAL: 1.5 10E3/UL (ref 1.6–8.3)
NEUTROPHILS # BLD MANUAL: 1.8 10E3/UL (ref 1.6–8.3)
NEUTROPHILS NFR BLD AUTO: 11 %
NEUTROPHILS NFR BLD AUTO: ABNORMAL %
NEUTROPHILS NFR BLD MANUAL: 10 %
NEUTROPHILS NFR BLD MANUAL: 11 %
NEUTROPHILS NFR BLD MANUAL: 12 %
NEUTROPHILS NFR BLD MANUAL: 12 %
NEUTROPHILS NFR BLD MANUAL: 13 %
NEUTROPHILS NFR BLD MANUAL: 13 %
NEUTROPHILS NFR BLD MANUAL: 14 %
NEUTROPHILS NFR BLD MANUAL: 15 %
NEUTROPHILS NFR BLD MANUAL: 15 %
NEUTROPHILS NFR BLD MANUAL: 18 %
NEUTROPHILS NFR BLD MANUAL: 20 %
NEUTROPHILS NFR BLD MANUAL: 22 %
NEUTROPHILS NFR BLD MANUAL: 22 %
NEUTROPHILS NFR BLD MANUAL: 23 %
NEUTROPHILS NFR BLD MANUAL: 23 %
NEUTROPHILS NFR BLD MANUAL: 24 %
NEUTROPHILS NFR BLD MANUAL: 24 %
NEUTROPHILS NFR BLD MANUAL: 25 %
NEUTROPHILS NFR BLD MANUAL: 25 %
NEUTROPHILS NFR BLD MANUAL: 26 %
NEUTROPHILS NFR BLD MANUAL: 26 %
NEUTROPHILS NFR BLD MANUAL: 27 %
NEUTROPHILS NFR BLD MANUAL: 27 %
NEUTROPHILS NFR BLD MANUAL: 28 %
NEUTROPHILS NFR BLD MANUAL: 29 %
NEUTROPHILS NFR BLD MANUAL: 30 %
NEUTROPHILS NFR BLD MANUAL: 31 %
NEUTROPHILS NFR BLD MANUAL: 33 %
NEUTROPHILS NFR BLD MANUAL: 35 %
NEUTROPHILS NFR BLD MANUAL: 35 %
NEUTROPHILS NFR BLD MANUAL: 40 %
NEUTROPHILS NFR BLD MANUAL: 41 %
NEUTROPHILS NFR BLD MANUAL: 43 %
NEUTROPHILS NFR BLD MANUAL: 45 %
NEUTROPHILS NFR BLD MANUAL: 47 %
NEUTROPHILS NFR BLD MANUAL: 5 %
NEUTROPHILS NFR BLD MANUAL: 9 %
NITRATE UR QL: NEGATIVE
NRBC # BLD AUTO: 0 10E3/UL
NRBC BLD AUTO-RTO: 0 /100
NRBC BLD AUTO-RTO: 2 /100
NRBC BLD AUTO-RTO: 2 /100
NRBC BLD MANUAL-RTO: 1 %
NRBC BLD MANUAL-RTO: 2 %
OTHER CELLS # BLD MANUAL: 0.1 10E3/UL
OTHER CELLS # BLD MANUAL: 0.1 10E3/UL
OTHER CELLS # BLD MANUAL: 0.2 10E3/UL
OTHER CELLS NFR BLD MANUAL: 2 %
OTHER CELLS NFR BLD MANUAL: 4 %
OTHER CELLS NFR BLD MANUAL: 8 %
OTHER UNITS TRANSFUSED: NORMAL
PATH REPORT.ADDENDUM SPEC: NORMAL
PATH REPORT.ADDENDUM SPEC: NORMAL
PATH REPORT.COMMENTS IMP SPEC: NORMAL
PATH REPORT.FINAL DX SPEC: NORMAL
PATH REPORT.FINAL DX SPEC: NORMAL
PATH REPORT.GROSS SPEC: NORMAL
PATH REPORT.MICROSCOPIC SPEC OTHER STN: NORMAL
PATH REPORT.RELEVANT HX SPEC: NORMAL
PATH REPORT.RELEVANT HX SPEC: NORMAL
PATH REV: ABNORMAL
PH UR STRIP: 5 [PH] (ref 5–7)
PH UR STRIP: 5.5 [PH] (ref 5–7)
PH UR STRIP: 6.5 [PH] (ref 5–7)
PHOSPHATE SERPL-MCNC: 4.5 MG/DL (ref 2.5–4.5)
PHOTO IMAGE: NORMAL
PLASMA CELLS # BLD MANUAL: 0 10E3/UL
PLASMA CELLS # BLD MANUAL: 0 10E3/UL
PLASMA CELLS NFR BLD MANUAL: 1 %
PLASMA CELLS NFR BLD MANUAL: 1 %
PLAT MORPH BLD: ABNORMAL
PLATELET # BLD AUTO: 10 10E3/UL (ref 150–450)
PLATELET # BLD AUTO: 11 10E3/UL (ref 150–450)
PLATELET # BLD AUTO: 11 10E3/UL (ref 150–450)
PLATELET # BLD AUTO: 12 10E3/UL (ref 150–450)
PLATELET # BLD AUTO: 12 10E3/UL (ref 150–450)
PLATELET # BLD AUTO: 13 10E3/UL (ref 150–450)
PLATELET # BLD AUTO: 14 10E3/UL (ref 150–450)
PLATELET # BLD AUTO: 15 10E3/UL (ref 150–450)
PLATELET # BLD AUTO: 16 10E3/UL (ref 150–450)
PLATELET # BLD AUTO: 16 10E3/UL (ref 150–450)
PLATELET # BLD AUTO: 17 10E3/UL (ref 150–450)
PLATELET # BLD AUTO: 18 10E3/UL (ref 150–450)
PLATELET # BLD AUTO: 18 10E3/UL (ref 150–450)
PLATELET # BLD AUTO: 19 10E3/UL (ref 150–450)
PLATELET # BLD AUTO: 2 10E3/UL (ref 150–450)
PLATELET # BLD AUTO: 22 10E3/UL (ref 150–450)
PLATELET # BLD AUTO: 24 10E3/UL (ref 150–450)
PLATELET # BLD AUTO: 26 10E3/UL (ref 150–450)
PLATELET # BLD AUTO: 3 10E3/UL (ref 150–450)
PLATELET # BLD AUTO: 35 10E3/UL (ref 150–450)
PLATELET # BLD AUTO: 36 10E3/UL (ref 150–450)
PLATELET # BLD AUTO: 4 10E3/UL (ref 150–450)
PLATELET # BLD AUTO: 42 10E3/UL (ref 150–450)
PLATELET # BLD AUTO: 42 10E3/UL (ref 150–450)
PLATELET # BLD AUTO: 44 10E3/UL (ref 150–450)
PLATELET # BLD AUTO: 49 10E3/UL (ref 150–450)
PLATELET # BLD AUTO: 5 10E3/UL (ref 150–450)
PLATELET # BLD AUTO: 58 10E3/UL (ref 150–450)
PLATELET # BLD AUTO: 6 10E3/UL (ref 150–450)
PLATELET # BLD AUTO: 66 10E3/UL (ref 150–450)
PLATELET # BLD AUTO: 7 10E3/UL (ref 150–450)
PLATELET # BLD AUTO: 8 10E3/UL (ref 150–450)
PLATELET # BLD AUTO: 9 10E3/UL (ref 150–450)
PLATELET # BLD AUTO: <2 10E3/UL (ref 150–450)
PLATELET # BLD AUTO: <2 10E3/UL (ref 150–450)
POST RXN CLERICAL CHECK: NORMAL
POST SPECIMEN APPEARANCE: NORMAL
POST-RXN ABO/RH: NORMAL
POST-RXN POLY DAT: NEGATIVE
POTASSIUM SERPL-SCNC: 3.6 MMOL/L (ref 3.4–5.3)
POTASSIUM SERPL-SCNC: 3.8 MMOL/L (ref 3.4–5.3)
POTASSIUM SERPL-SCNC: 3.9 MMOL/L (ref 3.4–5.3)
POTASSIUM SERPL-SCNC: 4 MMOL/L (ref 3.4–5.3)
POTASSIUM SERPL-SCNC: 4.1 MMOL/L (ref 3.4–5.3)
POTASSIUM SERPL-SCNC: 4.2 MMOL/L (ref 3.4–5.3)
POTASSIUM SERPL-SCNC: 4.3 MMOL/L (ref 3.4–5.3)
POTASSIUM SERPL-SCNC: 4.4 MMOL/L (ref 3.4–5.3)
POTASSIUM SERPL-SCNC: 4.5 MMOL/L (ref 3.4–5.3)
POTASSIUM SERPL-SCNC: 4.5 MMOL/L (ref 3.4–5.3)
POTASSIUM SERPL-SCNC: 4.6 MMOL/L (ref 3.4–5.3)
POTASSIUM SERPL-SCNC: 4.8 MMOL/L (ref 3.4–5.3)
PROCALCITONIN SERPL IA-MCNC: 0.52 NG/ML
PRODUCT CODE: NORMAL
PRODUCT CODE: NORMAL
PROT SERPL-MCNC: 6.8 G/DL (ref 6.4–8.3)
PROT SERPL-MCNC: 7.1 G/DL (ref 6.4–8.3)
PROT SERPL-MCNC: 7.2 G/DL (ref 6.4–8.3)
PROT SERPL-MCNC: 7.2 G/DL (ref 6.4–8.3)
PROT SERPL-MCNC: 7.3 G/DL (ref 6.4–8.3)
PROT SERPL-MCNC: 7.4 G/DL (ref 6.4–8.3)
PROT SERPL-MCNC: 7.5 G/DL (ref 6.4–8.3)
PROT SERPL-MCNC: 7.6 G/DL (ref 6.4–8.3)
PROT SERPL-MCNC: 7.9 G/DL (ref 6.4–8.3)
PROT SERPL-MCNC: 8.3 G/DL (ref 6.4–8.3)
RADIOLOGIST FLAGS: ABNORMAL
RBC # BLD AUTO: 2.07 10E6/UL (ref 3.8–5.2)
RBC # BLD AUTO: 2.09 10E6/UL (ref 3.8–5.2)
RBC # BLD AUTO: 2.12 10E6/UL (ref 3.8–5.2)
RBC # BLD AUTO: 2.16 10E6/UL (ref 3.8–5.2)
RBC # BLD AUTO: 2.18 10E6/UL (ref 3.8–5.2)
RBC # BLD AUTO: 2.2 10E6/UL (ref 3.8–5.2)
RBC # BLD AUTO: 2.24 10E6/UL (ref 3.8–5.2)
RBC # BLD AUTO: 2.24 10E6/UL (ref 3.8–5.2)
RBC # BLD AUTO: 2.25 10E6/UL (ref 3.8–5.2)
RBC # BLD AUTO: 2.29 10E6/UL (ref 3.8–5.2)
RBC # BLD AUTO: 2.3 10E6/UL (ref 3.8–5.2)
RBC # BLD AUTO: 2.3 10E6/UL (ref 3.8–5.2)
RBC # BLD AUTO: 2.33 10E6/UL (ref 3.8–5.2)
RBC # BLD AUTO: 2.35 10E6/UL (ref 3.8–5.2)
RBC # BLD AUTO: 2.39 10E6/UL (ref 3.8–5.2)
RBC # BLD AUTO: 2.42 10E6/UL (ref 3.8–5.2)
RBC # BLD AUTO: 2.5 10E6/UL (ref 3.8–5.2)
RBC # BLD AUTO: 2.51 10E6/UL (ref 3.8–5.2)
RBC # BLD AUTO: 2.54 10E6/UL (ref 3.8–5.2)
RBC # BLD AUTO: 2.61 10E6/UL (ref 3.8–5.2)
RBC # BLD AUTO: 2.62 10E6/UL (ref 3.8–5.2)
RBC # BLD AUTO: 2.63 10E6/UL (ref 3.8–5.2)
RBC # BLD AUTO: 2.64 10E6/UL (ref 3.8–5.2)
RBC # BLD AUTO: 2.69 10E6/UL (ref 3.8–5.2)
RBC # BLD AUTO: 2.7 10E6/UL (ref 3.8–5.2)
RBC # BLD AUTO: 2.71 10E6/UL (ref 3.8–5.2)
RBC # BLD AUTO: 2.71 10E6/UL (ref 3.8–5.2)
RBC # BLD AUTO: 2.72 10E6/UL (ref 3.8–5.2)
RBC # BLD AUTO: 2.73 10E6/UL (ref 3.8–5.2)
RBC # BLD AUTO: 2.73 10E6/UL (ref 3.8–5.2)
RBC # BLD AUTO: 2.74 10E6/UL (ref 3.8–5.2)
RBC # BLD AUTO: 2.76 10E6/UL (ref 3.8–5.2)
RBC # BLD AUTO: 2.76 10E6/UL (ref 3.8–5.2)
RBC # BLD AUTO: 2.79 10E6/UL (ref 3.8–5.2)
RBC # BLD AUTO: 2.81 10E6/UL (ref 3.8–5.2)
RBC # BLD AUTO: 2.83 10E6/UL (ref 3.8–5.2)
RBC # BLD AUTO: 2.83 10E6/UL (ref 3.8–5.2)
RBC # BLD AUTO: 2.85 10E6/UL (ref 3.8–5.2)
RBC # BLD AUTO: 2.85 10E6/UL (ref 3.8–5.2)
RBC # BLD AUTO: 2.87 10E6/UL (ref 3.8–5.2)
RBC # BLD AUTO: 2.9 10E6/UL (ref 3.8–5.2)
RBC # BLD AUTO: 2.96 10E6/UL (ref 3.8–5.2)
RBC # BLD AUTO: 2.99 10E6/UL (ref 3.8–5.2)
RBC # BLD AUTO: 3.01 10E6/UL (ref 3.8–5.2)
RBC # BLD AUTO: 3.01 10E6/UL (ref 3.8–5.2)
RBC # BLD AUTO: 3.05 10E6/UL (ref 3.8–5.2)
RBC # BLD AUTO: 3.06 10E6/UL (ref 3.8–5.2)
RBC # BLD AUTO: 3.09 10E6/UL (ref 3.8–5.2)
RBC # BLD AUTO: 3.14 10E6/UL (ref 3.8–5.2)
RBC # BLD AUTO: 3.15 10E6/UL (ref 3.8–5.2)
RBC # BLD AUTO: 3.16 10E6/UL (ref 3.8–5.2)
RBC # BLD AUTO: 3.17 10E6/UL (ref 3.8–5.2)
RBC MORPH BLD: ABNORMAL
RBC URINE: 30 /HPF
RBC URINE: >182 /HPF
RBC URINE: >182 /HPF
SMUDGE CELLS BLD QL SMEAR: PRESENT
SODIUM SERPL-SCNC: 136 MMOL/L (ref 136–145)
SODIUM SERPL-SCNC: 136 MMOL/L (ref 136–145)
SODIUM SERPL-SCNC: 137 MMOL/L (ref 136–145)
SODIUM SERPL-SCNC: 138 MMOL/L (ref 135–145)
SODIUM SERPL-SCNC: 138 MMOL/L (ref 136–145)
SODIUM SERPL-SCNC: 139 MMOL/L (ref 135–145)
SODIUM SERPL-SCNC: 139 MMOL/L (ref 136–145)
SODIUM SERPL-SCNC: 140 MMOL/L (ref 135–145)
SODIUM SERPL-SCNC: 140 MMOL/L (ref 135–145)
SODIUM SERPL-SCNC: 140 MMOL/L (ref 136–145)
SODIUM SERPL-SCNC: 141 MMOL/L (ref 135–145)
SODIUM SERPL-SCNC: 141 MMOL/L (ref 135–145)
SODIUM SERPL-SCNC: 141 MMOL/L (ref 136–145)
SP GR UR STRIP: 1.02 (ref 1–1.03)
SPECIMEN EXPIRATION DATE: NORMAL
SQUAMOUS EPITHELIAL: 11 /HPF
SQUAMOUS EPITHELIAL: 3 /HPF
TARGETS BLD QL SMEAR: SLIGHT
TRANSFUSION REACTION-HIVES: NORMAL
TRANSITIONAL EPI: 1 /HPF
TRANSITIONAL EPI: 5 /HPF
UNIT ABO/RH: NORMAL
UNIT BLOOD TYPE TRANSFUSION REACTION: NORMAL
UNIT NUMBER: NORMAL
UNIT STATUS: NORMAL
UNIT TYPE ISBT: 5100
UNIT TYPE ISBT: 600
UNIT TYPE ISBT: 600
UNIT TYPE ISBT: 6200
UNIT TYPE ISBT: 7300
UNIT TYPE ISBT: 7300
UNIT TYPE ISBT: 9500
URATE SERPL-MCNC: 6.6 MG/DL (ref 2.4–5.7)
URATE SERPL-MCNC: 6.6 MG/DL (ref 2.4–5.7)
URATE SERPL-MCNC: 7 MG/DL (ref 2.4–5.7)
UROBILINOGEN UR STRIP-MCNC: NORMAL MG/DL
VARIANT LYMPHS BLD QL SMEAR: PRESENT
WBC # BLD AUTO: 0.9 10E3/UL (ref 4–11)
WBC # BLD AUTO: 1 10E3/UL (ref 4–11)
WBC # BLD AUTO: 1 10E3/UL (ref 4–11)
WBC # BLD AUTO: 1.1 10E3/UL (ref 4–11)
WBC # BLD AUTO: 1.2 10E3/UL (ref 4–11)
WBC # BLD AUTO: 1.3 10E3/UL (ref 4–11)
WBC # BLD AUTO: 1.3 10E3/UL (ref 4–11)
WBC # BLD AUTO: 1.4 10E3/UL (ref 4–11)
WBC # BLD AUTO: 1.5 10E3/UL (ref 4–11)
WBC # BLD AUTO: 1.6 10E3/UL (ref 4–11)
WBC # BLD AUTO: 1.7 10E3/UL (ref 4–11)
WBC # BLD AUTO: 2.1 10E3/UL (ref 4–11)
WBC # BLD AUTO: 2.2 10E3/UL (ref 4–11)
WBC # BLD AUTO: 2.3 10E3/UL (ref 4–11)
WBC # BLD AUTO: 2.3 10E3/UL (ref 4–11)
WBC # BLD AUTO: 2.4 10E3/UL (ref 4–11)
WBC # BLD AUTO: 2.5 10E3/UL (ref 4–11)
WBC # BLD AUTO: 2.6 10E3/UL (ref 4–11)
WBC # BLD AUTO: 2.7 10E3/UL (ref 4–11)
WBC # BLD AUTO: 3 10E3/UL (ref 4–11)
WBC # BLD AUTO: 3.1 10E3/UL (ref 4–11)
WBC # BLD AUTO: 3.2 10E3/UL (ref 4–11)
WBC # BLD AUTO: 3.3 10E3/UL (ref 4–11)
WBC # BLD AUTO: 3.4 10E3/UL (ref 4–11)
WBC # BLD AUTO: 3.5 10E3/UL (ref 4–11)
WBC # BLD AUTO: 3.5 10E3/UL (ref 4–11)
WBC # BLD AUTO: 3.7 10E3/UL (ref 4–11)
WBC # BLD AUTO: 4.1 10E3/UL (ref 4–11)
WBC # BLD AUTO: 4.5 10E3/UL (ref 4–11)
WBC # BLD AUTO: 4.5 10E3/UL (ref 4–11)
WBC CLUMPS #/AREA URNS HPF: PRESENT /HPF
WBC URINE: 14 /HPF
WBC URINE: 32 /HPF
WBC URINE: >182 /HPF

## 2023-01-01 PROCEDURE — P9037 PLATE PHERES LEUKOREDU IRRAD: HCPCS | Performed by: INTERNAL MEDICINE

## 2023-01-01 PROCEDURE — P9016 RBC LEUKOCYTES REDUCED: HCPCS | Performed by: INTERNAL MEDICINE

## 2023-01-01 PROCEDURE — 80053 COMPREHEN METABOLIC PANEL: CPT | Performed by: INTERNAL MEDICINE

## 2023-01-01 PROCEDURE — 80053 COMPREHEN METABOLIC PANEL: CPT

## 2023-01-01 PROCEDURE — 99223 1ST HOSP IP/OBS HIGH 75: CPT | Performed by: HOSPITALIST

## 2023-01-01 PROCEDURE — 86901 BLOOD TYPING SEROLOGIC RH(D): CPT | Performed by: INTERNAL MEDICINE

## 2023-01-01 PROCEDURE — 96401 CHEMO ANTI-NEOPL SQ/IM: CPT

## 2023-01-01 PROCEDURE — 85027 COMPLETE CBC AUTOMATED: CPT | Performed by: INTERNAL MEDICINE

## 2023-01-01 PROCEDURE — 36430 TRANSFUSION BLD/BLD COMPNT: CPT

## 2023-01-01 PROCEDURE — 83735 ASSAY OF MAGNESIUM: CPT | Performed by: INTERNAL MEDICINE

## 2023-01-01 PROCEDURE — 86901 BLOOD TYPING SEROLOGIC RH(D): CPT

## 2023-01-01 PROCEDURE — 96372 THER/PROPH/DIAG INJ SC/IM: CPT | Mod: 59 | Performed by: INTERNAL MEDICINE

## 2023-01-01 PROCEDURE — 86078 PHYS BLOOD BANK SERV REACTJ: CPT | Performed by: PATHOLOGY

## 2023-01-01 PROCEDURE — 36415 COLL VENOUS BLD VENIPUNCTURE: CPT

## 2023-01-01 PROCEDURE — G0463 HOSPITAL OUTPT CLINIC VISIT: HCPCS | Mod: 25

## 2023-01-01 PROCEDURE — 85007 BL SMEAR W/DIFF WBC COUNT: CPT | Performed by: INTERNAL MEDICINE

## 2023-01-01 PROCEDURE — 85027 COMPLETE CBC AUTOMATED: CPT

## 2023-01-01 PROCEDURE — G0463 HOSPITAL OUTPT CLINIC VISIT: HCPCS | Performed by: INTERNAL MEDICINE

## 2023-01-01 PROCEDURE — 250N000011 HC RX IP 250 OP 636

## 2023-01-01 PROCEDURE — 86900 BLOOD TYPING SEROLOGIC ABO: CPT

## 2023-01-01 PROCEDURE — 85027 COMPLETE CBC AUTOMATED: CPT | Performed by: EMERGENCY MEDICINE

## 2023-01-01 PROCEDURE — 86923 COMPATIBILITY TEST ELECTRIC: CPT | Performed by: INTERNAL MEDICINE

## 2023-01-01 PROCEDURE — 84100 ASSAY OF PHOSPHORUS: CPT | Performed by: HOSPITALIST

## 2023-01-01 PROCEDURE — P9016 RBC LEUKOCYTES REDUCED: HCPCS | Mod: 59

## 2023-01-01 PROCEDURE — 36415 COLL VENOUS BLD VENIPUNCTURE: CPT | Performed by: INTERNAL MEDICINE

## 2023-01-01 PROCEDURE — 86923 COMPATIBILITY TEST ELECTRIC: CPT

## 2023-01-01 PROCEDURE — 85007 BL SMEAR W/DIFF WBC COUNT: CPT

## 2023-01-01 PROCEDURE — 80048 BASIC METABOLIC PNL TOTAL CA: CPT | Performed by: INTERNAL MEDICINE

## 2023-01-01 PROCEDURE — 250N000011 HC RX IP 250 OP 636: Mod: JW | Performed by: INTERNAL MEDICINE

## 2023-01-01 PROCEDURE — 86850 RBC ANTIBODY SCREEN: CPT | Performed by: INTERNAL MEDICINE

## 2023-01-01 PROCEDURE — 74176 CT ABD & PELVIS W/O CONTRAST: CPT | Mod: GC | Performed by: STUDENT IN AN ORGANIZED HEALTH CARE EDUCATION/TRAINING PROGRAM

## 2023-01-01 PROCEDURE — 86850 RBC ANTIBODY SCREEN: CPT

## 2023-01-01 PROCEDURE — 250N000013 HC RX MED GY IP 250 OP 250 PS 637: Performed by: INTERNAL MEDICINE

## 2023-01-01 PROCEDURE — 258N000003 HC RX IP 258 OP 636: Performed by: EMERGENCY MEDICINE

## 2023-01-01 PROCEDURE — 86900 BLOOD TYPING SEROLOGIC ABO: CPT | Performed by: INTERNAL MEDICINE

## 2023-01-01 PROCEDURE — 99239 HOSP IP/OBS DSCHRG MGMT >30: CPT | Performed by: INTERNAL MEDICINE

## 2023-01-01 PROCEDURE — 88305 TISSUE EXAM BY PATHOLOGIST: CPT | Mod: 26 | Performed by: PATHOLOGY

## 2023-01-01 PROCEDURE — 999N000127 HC STATISTIC PERIPHERAL IV START W US GUIDANCE

## 2023-01-01 PROCEDURE — 250N000025 HC SEVOFLURANE, PER MIN: Performed by: UROLOGY

## 2023-01-01 PROCEDURE — 250N000011 HC RX IP 250 OP 636: Performed by: INTERNAL MEDICINE

## 2023-01-01 PROCEDURE — G0463 HOSPITAL OUTPT CLINIC VISIT: HCPCS | Mod: 25 | Performed by: INTERNAL MEDICINE

## 2023-01-01 PROCEDURE — 83550 IRON BINDING TEST: CPT | Performed by: INTERNAL MEDICINE

## 2023-01-01 PROCEDURE — 99213 OFFICE O/P EST LOW 20 MIN: CPT

## 2023-01-01 PROCEDURE — 36415 COLL VENOUS BLD VENIPUNCTURE: CPT | Performed by: HOSPITALIST

## 2023-01-01 PROCEDURE — 0TJ98ZZ INSPECTION OF URETER, VIA NATURAL OR ARTIFICIAL OPENING ENDOSCOPIC: ICD-10-PCS | Performed by: UROLOGY

## 2023-01-01 PROCEDURE — 99207 PR NO BILLABLE SERVICE THIS VISIT: CPT | Performed by: PHYSICIAN ASSISTANT

## 2023-01-01 PROCEDURE — P9037 PLATE PHERES LEUKOREDU IRRAD: HCPCS

## 2023-01-01 PROCEDURE — 45331 SIGMOIDOSCOPY AND BIOPSY: CPT | Performed by: INTERNAL MEDICINE

## 2023-01-01 PROCEDURE — P9016 RBC LEUKOCYTES REDUCED: HCPCS | Mod: 59 | Performed by: INTERNAL MEDICINE

## 2023-01-01 PROCEDURE — 258N000003 HC RX IP 258 OP 636: Performed by: HOSPITALIST

## 2023-01-01 PROCEDURE — 99215 OFFICE O/P EST HI 40 MIN: CPT | Performed by: INTERNAL MEDICINE

## 2023-01-01 PROCEDURE — 99203 OFFICE O/P NEW LOW 30 MIN: CPT | Mod: 25 | Performed by: PODIATRIST

## 2023-01-01 PROCEDURE — P9035 PLATELET PHERES LEUKOREDUCED: HCPCS

## 2023-01-01 PROCEDURE — P9016 RBC LEUKOCYTES REDUCED: HCPCS | Performed by: HOSPITALIST

## 2023-01-01 PROCEDURE — 250N000011 HC RX IP 250 OP 636: Mod: JZ | Performed by: HOSPITALIST

## 2023-01-01 PROCEDURE — 81003 URINALYSIS AUTO W/O SCOPE: CPT

## 2023-01-01 PROCEDURE — 85041 AUTOMATED RBC COUNT: CPT | Performed by: INTERNAL MEDICINE

## 2023-01-01 PROCEDURE — 250N000013 HC RX MED GY IP 250 OP 250 PS 637: Performed by: EMERGENCY MEDICINE

## 2023-01-01 PROCEDURE — G0463 HOSPITAL OUTPT CLINIC VISIT: HCPCS

## 2023-01-01 PROCEDURE — 82310 ASSAY OF CALCIUM: CPT

## 2023-01-01 PROCEDURE — 88342 IMHCHEM/IMCYTCHM 1ST ANTB: CPT | Mod: TC | Performed by: INTERNAL MEDICINE

## 2023-01-01 PROCEDURE — 97530 THERAPEUTIC ACTIVITIES: CPT | Mod: GP | Performed by: PHYSICAL THERAPIST

## 2023-01-01 PROCEDURE — 80048 BASIC METABOLIC PNL TOTAL CA: CPT

## 2023-01-01 PROCEDURE — 80048 BASIC METABOLIC PNL TOTAL CA: CPT | Performed by: HOSPITALIST

## 2023-01-01 PROCEDURE — 81001 URINALYSIS AUTO W/SCOPE: CPT

## 2023-01-01 PROCEDURE — 83735 ASSAY OF MAGNESIUM: CPT | Performed by: HOSPITALIST

## 2023-01-01 PROCEDURE — 85027 COMPLETE CBC AUTOMATED: CPT | Performed by: HOSPITALIST

## 2023-01-01 PROCEDURE — 87086 URINE CULTURE/COLONY COUNT: CPT | Performed by: INTERNAL MEDICINE

## 2023-01-01 PROCEDURE — 96372 THER/PROPH/DIAG INJ SC/IM: CPT | Performed by: INTERNAL MEDICINE

## 2023-01-01 PROCEDURE — P9035 PLATELET PHERES LEUKOREDUCED: HCPCS | Performed by: INTERNAL MEDICINE

## 2023-01-01 PROCEDURE — 99285 EMERGENCY DEPT VISIT HI MDM: CPT | Mod: 25

## 2023-01-01 PROCEDURE — 82306 VITAMIN D 25 HYDROXY: CPT | Performed by: INTERNAL MEDICINE

## 2023-01-01 PROCEDURE — 250N000013 HC RX MED GY IP 250 OP 250 PS 637

## 2023-01-01 PROCEDURE — 81001 URINALYSIS AUTO W/SCOPE: CPT | Performed by: INTERNAL MEDICINE

## 2023-01-01 PROCEDURE — 99232 SBSQ HOSP IP/OBS MODERATE 35: CPT | Performed by: INTERNAL MEDICINE

## 2023-01-01 PROCEDURE — 272N000001 HC OR GENERAL SUPPLY STERILE: Performed by: UROLOGY

## 2023-01-01 PROCEDURE — 258N000003 HC RX IP 258 OP 636: Performed by: ANESTHESIOLOGY

## 2023-01-01 PROCEDURE — 250N000013 HC RX MED GY IP 250 OP 250 PS 637: Performed by: HOSPITALIST

## 2023-01-01 PROCEDURE — 85014 HEMATOCRIT: CPT | Performed by: INTERNAL MEDICINE

## 2023-01-01 PROCEDURE — 250N000009 HC RX 250

## 2023-01-01 PROCEDURE — 360N000076 HC SURGERY LEVEL 3, PER MIN: Performed by: UROLOGY

## 2023-01-01 PROCEDURE — P9016 RBC LEUKOCYTES REDUCED: HCPCS

## 2023-01-01 PROCEDURE — 87086 URINE CULTURE/COLONY COUNT: CPT

## 2023-01-01 PROCEDURE — 250N000011 HC RX IP 250 OP 636: Mod: JZ | Performed by: INTERNAL MEDICINE

## 2023-01-01 PROCEDURE — 83615 LACTATE (LD) (LDH) ENZYME: CPT | Performed by: INTERNAL MEDICINE

## 2023-01-01 PROCEDURE — 99222 1ST HOSP IP/OBS MODERATE 55: CPT | Performed by: INTERNAL MEDICINE

## 2023-01-01 PROCEDURE — 99417 PROLNG OP E/M EACH 15 MIN: CPT | Performed by: INTERNAL MEDICINE

## 2023-01-01 PROCEDURE — 120N000001 HC R&B MED SURG/OB

## 2023-01-01 PROCEDURE — 97161 PT EVAL LOW COMPLEX 20 MIN: CPT | Mod: GP | Performed by: PHYSICAL THERAPIST

## 2023-01-01 PROCEDURE — 258N000003 HC RX IP 258 OP 636: Performed by: NURSE ANESTHETIST, CERTIFIED REGISTERED

## 2023-01-01 PROCEDURE — 88342 IMHCHEM/IMCYTCHM 1ST ANTB: CPT | Mod: 26 | Performed by: PATHOLOGY

## 2023-01-01 PROCEDURE — 83605 ASSAY OF LACTIC ACID: CPT | Performed by: INTERNAL MEDICINE

## 2023-01-01 PROCEDURE — 36415 COLL VENOUS BLD VENIPUNCTURE: CPT | Performed by: ANESTHESIOLOGY

## 2023-01-01 PROCEDURE — 370N000017 HC ANESTHESIA TECHNICAL FEE, PER MIN: Performed by: UROLOGY

## 2023-01-01 PROCEDURE — 99213 OFFICE O/P EST LOW 20 MIN: CPT | Performed by: NURSE PRACTITIONER

## 2023-01-01 PROCEDURE — 258N000003 HC RX IP 258 OP 636

## 2023-01-01 PROCEDURE — 83735 ASSAY OF MAGNESIUM: CPT | Performed by: ANESTHESIOLOGY

## 2023-01-01 PROCEDURE — 85048 AUTOMATED LEUKOCYTE COUNT: CPT

## 2023-01-01 PROCEDURE — 999N000128 HC STATISTIC PERIPHERAL IV START W/O US GUIDANCE

## 2023-01-01 PROCEDURE — 999N000248 HC STATISTIC IV INSERT WITH US BY RN

## 2023-01-01 PROCEDURE — 99215 OFFICE O/P EST HI 40 MIN: CPT

## 2023-01-01 PROCEDURE — 99213 OFFICE O/P EST LOW 20 MIN: CPT | Performed by: PODIATRIST

## 2023-01-01 PROCEDURE — 258N000003 HC RX IP 258 OP 636: Performed by: INTERNAL MEDICINE

## 2023-01-01 PROCEDURE — 250N000011 HC RX IP 250 OP 636: Mod: JZ | Performed by: EMERGENCY MEDICINE

## 2023-01-01 PROCEDURE — 86923 COMPATIBILITY TEST ELECTRIC: CPT | Performed by: HOSPITALIST

## 2023-01-01 PROCEDURE — 83605 ASSAY OF LACTIC ACID: CPT | Performed by: EMERGENCY MEDICINE

## 2023-01-01 PROCEDURE — 370N000017 HC ANESTHESIA TECHNICAL FEE, PER MIN: Performed by: INTERNAL MEDICINE

## 2023-01-01 PROCEDURE — 86920 COMPATIBILITY TEST SPIN: CPT | Performed by: INTERNAL MEDICINE

## 2023-01-01 PROCEDURE — 250N000009 HC RX 250: Performed by: NURSE ANESTHETIST, CERTIFIED REGISTERED

## 2023-01-01 PROCEDURE — C1769 GUIDE WIRE: HCPCS | Performed by: UROLOGY

## 2023-01-01 PROCEDURE — 82310 ASSAY OF CALCIUM: CPT | Performed by: INTERNAL MEDICINE

## 2023-01-01 PROCEDURE — 255N000002 HC RX 255 OP 636: Mod: JZ | Performed by: UROLOGY

## 2023-01-01 PROCEDURE — 99233 SBSQ HOSP IP/OBS HIGH 50: CPT | Performed by: INTERNAL MEDICINE

## 2023-01-01 PROCEDURE — 85049 AUTOMATED PLATELET COUNT: CPT

## 2023-01-01 PROCEDURE — 96360 HYDRATION IV INFUSION INIT: CPT

## 2023-01-01 PROCEDURE — 85027 COMPLETE CBC AUTOMATED: CPT | Performed by: ANESTHESIOLOGY

## 2023-01-01 PROCEDURE — 84550 ASSAY OF BLOOD/URIC ACID: CPT | Performed by: INTERNAL MEDICINE

## 2023-01-01 PROCEDURE — 250N000011 HC RX IP 250 OP 636: Performed by: PHYSICIAN ASSISTANT

## 2023-01-01 PROCEDURE — 85025 COMPLETE CBC W/AUTO DIFF WBC: CPT

## 2023-01-01 PROCEDURE — 999N000010 HC STATISTIC ANES STAT CODE-CRNA PER MINUTE: Performed by: INTERNAL MEDICINE

## 2023-01-01 PROCEDURE — 710N000009 HC RECOVERY PHASE 1, LEVEL 1, PER MIN: Performed by: UROLOGY

## 2023-01-01 PROCEDURE — 999N000141 HC STATISTIC PRE-PROCEDURE NURSING ASSESSMENT: Performed by: UROLOGY

## 2023-01-01 PROCEDURE — 85007 BL SMEAR W/DIFF WBC COUNT: CPT | Performed by: EMERGENCY MEDICINE

## 2023-01-01 PROCEDURE — 84550 ASSAY OF BLOOD/URIC ACID: CPT

## 2023-01-01 PROCEDURE — 36415 COLL VENOUS BLD VENIPUNCTURE: CPT | Performed by: EMERGENCY MEDICINE

## 2023-01-01 PROCEDURE — BT1D1ZZ FLUOROSCOPY OF RIGHT KIDNEY, URETER AND BLADDER USING LOW OSMOLAR CONTRAST: ICD-10-PCS | Performed by: UROLOGY

## 2023-01-01 PROCEDURE — 97116 GAIT TRAINING THERAPY: CPT | Mod: GP | Performed by: PHYSICAL THERAPIST

## 2023-01-01 PROCEDURE — 999N000179 XR SURGERY CARM FLUORO LESS THAN 5 MIN W STILLS: Mod: TC

## 2023-01-01 PROCEDURE — 80048 BASIC METABOLIC PNL TOTAL CA: CPT | Performed by: ANESTHESIOLOGY

## 2023-01-01 PROCEDURE — 250N000011 HC RX IP 250 OP 636: Performed by: NURSE ANESTHETIST, CERTIFIED REGISTERED

## 2023-01-01 PROCEDURE — 97597 DBRDMT OPN WND 1ST 20 CM/<: CPT | Performed by: PODIATRIST

## 2023-01-01 PROCEDURE — 85041 AUTOMATED RBC COUNT: CPT

## 2023-01-01 PROCEDURE — 84145 PROCALCITONIN (PCT): CPT | Performed by: EMERGENCY MEDICINE

## 2023-01-01 RX ORDER — HEPARIN SODIUM (PORCINE) LOCK FLUSH IV SOLN 100 UNIT/ML 100 UNIT/ML
5 SOLUTION INTRAVENOUS
Status: CANCELLED | OUTPATIENT
Start: 2023-01-01

## 2023-01-01 RX ORDER — ALBUTEROL SULFATE 90 UG/1
1-2 AEROSOL, METERED RESPIRATORY (INHALATION)
Status: CANCELLED
Start: 2023-01-01

## 2023-01-01 RX ORDER — METHYLPREDNISOLONE SODIUM SUCCINATE 125 MG/2ML
125 INJECTION, POWDER, LYOPHILIZED, FOR SOLUTION INTRAMUSCULAR; INTRAVENOUS
Status: CANCELLED
Start: 2023-01-01

## 2023-01-01 RX ORDER — ONDANSETRON 2 MG/ML
4 INJECTION INTRAMUSCULAR; INTRAVENOUS EVERY 6 HOURS PRN
Status: DISCONTINUED | OUTPATIENT
Start: 2023-01-01 | End: 2023-01-01 | Stop reason: HOSPADM

## 2023-01-01 RX ORDER — EPINEPHRINE 1 MG/ML
0.3 INJECTION, SOLUTION INTRAMUSCULAR; SUBCUTANEOUS EVERY 5 MIN PRN
Status: CANCELLED | OUTPATIENT
Start: 2023-01-01

## 2023-01-01 RX ORDER — DIPHENHYDRAMINE HYDROCHLORIDE 50 MG/ML
50 INJECTION INTRAMUSCULAR; INTRAVENOUS
Status: CANCELLED
Start: 2023-01-01

## 2023-01-01 RX ORDER — LEVOFLOXACIN 250 MG/1
TABLET, FILM COATED ORAL
Qty: 1 TABLET | Refills: 0 | Status: ON HOLD | OUTPATIENT
Start: 2023-01-01 | End: 2023-01-01

## 2023-01-01 RX ORDER — LEVOFLOXACIN 500 MG/1
500 TABLET, FILM COATED ORAL DAILY
Qty: 7 TABLET | Refills: 0 | Status: SHIPPED | OUTPATIENT
Start: 2023-01-01 | End: 2023-01-01

## 2023-01-01 RX ORDER — ACETAMINOPHEN 325 MG/1
650 TABLET ORAL ONCE
Status: COMPLETED | OUTPATIENT
Start: 2023-01-01 | End: 2023-01-01

## 2023-01-01 RX ORDER — ALBUTEROL SULFATE 0.83 MG/ML
2.5 SOLUTION RESPIRATORY (INHALATION)
Status: CANCELLED | OUTPATIENT
Start: 2023-01-01

## 2023-01-01 RX ORDER — MEPERIDINE HYDROCHLORIDE 25 MG/ML
25 INJECTION INTRAMUSCULAR; INTRAVENOUS; SUBCUTANEOUS EVERY 30 MIN PRN
Status: CANCELLED | OUTPATIENT
Start: 2023-01-01

## 2023-01-01 RX ORDER — ACETAMINOPHEN 650 MG/1
650 SUPPOSITORY RECTAL EVERY 6 HOURS PRN
Status: DISCONTINUED | OUTPATIENT
Start: 2023-01-01 | End: 2023-01-01

## 2023-01-01 RX ORDER — ACETAMINOPHEN 325 MG/1
650 TABLET ORAL ONCE
Status: DISCONTINUED | OUTPATIENT
Start: 2023-01-01 | End: 2023-01-01 | Stop reason: HOSPADM

## 2023-01-01 RX ORDER — HEPARIN SODIUM,PORCINE 10 UNIT/ML
5 VIAL (ML) INTRAVENOUS
Status: CANCELLED | OUTPATIENT
Start: 2023-01-01

## 2023-01-01 RX ORDER — IOPAMIDOL 612 MG/ML
INJECTION, SOLUTION INTRAVASCULAR PRN
Status: DISCONTINUED | OUTPATIENT
Start: 2023-01-01 | End: 2023-01-01 | Stop reason: HOSPADM

## 2023-01-01 RX ORDER — DIPHENHYDRAMINE HCL 25 MG
25 CAPSULE ORAL ONCE
Status: CANCELLED
Start: 2023-01-01 | End: 2023-01-01

## 2023-01-01 RX ORDER — LORAZEPAM 2 MG/ML
0.5 INJECTION INTRAMUSCULAR EVERY 4 HOURS PRN
Status: CANCELLED | OUTPATIENT
Start: 2023-01-01

## 2023-01-01 RX ORDER — HEPARIN SODIUM,PORCINE 10 UNIT/ML
5 VIAL (ML) INTRAVENOUS
Status: DISCONTINUED | OUTPATIENT
Start: 2023-01-01 | End: 2023-01-01 | Stop reason: HOSPADM

## 2023-01-01 RX ORDER — CEFTRIAXONE 1 G/1
1 INJECTION, POWDER, FOR SOLUTION INTRAMUSCULAR; INTRAVENOUS EVERY 24 HOURS
Status: DISCONTINUED | OUTPATIENT
Start: 2023-01-01 | End: 2023-01-01

## 2023-01-01 RX ORDER — ONDANSETRON 8 MG/1
TABLET, FILM COATED ORAL
Qty: 30 TABLET | Refills: 5 | Status: SHIPPED | OUTPATIENT
Start: 2023-01-01

## 2023-01-01 RX ORDER — LEVOFLOXACIN 500 MG/1
TABLET, FILM COATED ORAL
Qty: 3 TABLET | Refills: 0 | Status: ON HOLD | OUTPATIENT
Start: 2023-01-01 | End: 2023-01-01

## 2023-01-01 RX ORDER — DEXAMETHASONE SODIUM PHOSPHATE 4 MG/ML
INJECTION, SOLUTION INTRA-ARTICULAR; INTRALESIONAL; INTRAMUSCULAR; INTRAVENOUS; SOFT TISSUE PRN
Status: DISCONTINUED | OUTPATIENT
Start: 2023-01-01 | End: 2023-01-01

## 2023-01-01 RX ORDER — SODIUM CHLORIDE, SODIUM LACTATE, POTASSIUM CHLORIDE, CALCIUM CHLORIDE 600; 310; 30; 20 MG/100ML; MG/100ML; MG/100ML; MG/100ML
INJECTION, SOLUTION INTRAVENOUS CONTINUOUS
Status: DISCONTINUED | OUTPATIENT
Start: 2023-01-01 | End: 2023-01-01 | Stop reason: HOSPADM

## 2023-01-01 RX ORDER — HYDROMORPHONE HCL IN WATER/PF 6 MG/30 ML
0.2 PATIENT CONTROLLED ANALGESIA SYRINGE INTRAVENOUS EVERY 5 MIN PRN
Status: DISCONTINUED | OUTPATIENT
Start: 2023-01-01 | End: 2023-01-01 | Stop reason: HOSPADM

## 2023-01-01 RX ORDER — FENTANYL CITRATE 0.05 MG/ML
25 INJECTION, SOLUTION INTRAMUSCULAR; INTRAVENOUS EVERY 5 MIN PRN
Status: DISCONTINUED | OUTPATIENT
Start: 2023-01-01 | End: 2023-01-01 | Stop reason: HOSPADM

## 2023-01-01 RX ORDER — AMOXICILLIN 500 MG/1
CAPSULE ORAL
COMMUNITY
Start: 2023-01-01 | End: 2023-01-01

## 2023-01-01 RX ORDER — ACETAMINOPHEN 325 MG/1
650 TABLET ORAL ONCE
Status: DISCONTINUED | OUTPATIENT
Start: 2023-01-01 | End: 2023-01-01

## 2023-01-01 RX ORDER — DIPHENHYDRAMINE HCL 25 MG
25 CAPSULE ORAL ONCE
Status: COMPLETED | OUTPATIENT
Start: 2023-01-01 | End: 2023-01-01

## 2023-01-01 RX ORDER — NALOXONE HYDROCHLORIDE 0.4 MG/ML
0.2 INJECTION, SOLUTION INTRAMUSCULAR; INTRAVENOUS; SUBCUTANEOUS
Status: DISCONTINUED | OUTPATIENT
Start: 2023-01-01 | End: 2023-01-01 | Stop reason: HOSPADM

## 2023-01-01 RX ORDER — HYDROMORPHONE HCL IN WATER/PF 6 MG/30 ML
0.4 PATIENT CONTROLLED ANALGESIA SYRINGE INTRAVENOUS EVERY 5 MIN PRN
Status: DISCONTINUED | OUTPATIENT
Start: 2023-01-01 | End: 2023-01-01 | Stop reason: HOSPADM

## 2023-01-01 RX ORDER — ACYCLOVIR 400 MG/1
400 TABLET ORAL 2 TIMES DAILY
Status: DISCONTINUED | OUTPATIENT
Start: 2023-01-01 | End: 2023-01-01 | Stop reason: HOSPADM

## 2023-01-01 RX ORDER — HEPARIN SODIUM (PORCINE) LOCK FLUSH IV SOLN 100 UNIT/ML 100 UNIT/ML
5 SOLUTION INTRAVENOUS
Status: DISCONTINUED | OUTPATIENT
Start: 2023-01-01 | End: 2023-01-01 | Stop reason: HOSPADM

## 2023-01-01 RX ORDER — HYDROMORPHONE HCL IN WATER/PF 6 MG/30 ML
0.4 PATIENT CONTROLLED ANALGESIA SYRINGE INTRAVENOUS
Status: DISCONTINUED | OUTPATIENT
Start: 2023-01-01 | End: 2023-01-01 | Stop reason: HOSPADM

## 2023-01-01 RX ORDER — DIPHENHYDRAMINE HCL 25 MG
25 CAPSULE ORAL ONCE
Status: DISCONTINUED | OUTPATIENT
Start: 2023-01-01 | End: 2023-01-01 | Stop reason: HOSPADM

## 2023-01-01 RX ORDER — MAGNESIUM SULFATE HEPTAHYDRATE 40 MG/ML
2 INJECTION, SOLUTION INTRAVENOUS ONCE
Status: COMPLETED | OUTPATIENT
Start: 2023-01-01 | End: 2023-01-01

## 2023-01-01 RX ORDER — LIDOCAINE 40 MG/G
CREAM TOPICAL
Status: DISCONTINUED | OUTPATIENT
Start: 2023-01-01 | End: 2023-01-01 | Stop reason: HOSPADM

## 2023-01-01 RX ORDER — HEPARIN SODIUM (PORCINE) LOCK FLUSH IV SOLN 100 UNIT/ML 100 UNIT/ML
5 SOLUTION INTRAVENOUS
Status: DISCONTINUED | OUTPATIENT
Start: 2023-01-01 | End: 2023-01-01

## 2023-01-01 RX ORDER — OXYCODONE HYDROCHLORIDE 5 MG/1
5 TABLET ORAL EVERY 4 HOURS PRN
Status: DISCONTINUED | OUTPATIENT
Start: 2023-01-01 | End: 2023-01-01 | Stop reason: HOSPADM

## 2023-01-01 RX ORDER — OXYCODONE HYDROCHLORIDE 5 MG/1
5 TABLET ORAL EVERY 6 HOURS PRN
COMMUNITY
Start: 2023-01-01 | End: 2023-01-01

## 2023-01-01 RX ORDER — ACETAMINOPHEN 325 MG/1
650 TABLET ORAL ONCE
Status: CANCELLED
Start: 2023-01-01 | End: 2023-01-01

## 2023-01-01 RX ORDER — PANTOPRAZOLE SODIUM 20 MG/1
20 TABLET, DELAYED RELEASE ORAL DAILY
Qty: 30 TABLET | Refills: 4 | Status: SHIPPED | OUTPATIENT
Start: 2023-01-01 | End: 2023-12-26

## 2023-01-01 RX ORDER — ACETAMINOPHEN 325 MG/1
650 TABLET ORAL EVERY 6 HOURS PRN
Status: DISCONTINUED | OUTPATIENT
Start: 2023-01-01 | End: 2023-01-01

## 2023-01-01 RX ORDER — NALOXONE HYDROCHLORIDE 0.4 MG/ML
0.4 INJECTION, SOLUTION INTRAMUSCULAR; INTRAVENOUS; SUBCUTANEOUS
Status: DISCONTINUED | OUTPATIENT
Start: 2023-01-01 | End: 2023-01-01 | Stop reason: HOSPADM

## 2023-01-01 RX ORDER — AMOXICILLIN 250 MG
2 CAPSULE ORAL 2 TIMES DAILY PRN
Status: DISCONTINUED | OUTPATIENT
Start: 2023-01-01 | End: 2023-01-01 | Stop reason: HOSPADM

## 2023-01-01 RX ORDER — HYDROMORPHONE HCL IN WATER/PF 6 MG/30 ML
0.2 PATIENT CONTROLLED ANALGESIA SYRINGE INTRAVENOUS
Status: DISCONTINUED | OUTPATIENT
Start: 2023-01-01 | End: 2023-01-01 | Stop reason: HOSPADM

## 2023-01-01 RX ORDER — SODIUM CHLORIDE, SODIUM LACTATE, POTASSIUM CHLORIDE, CALCIUM CHLORIDE 600; 310; 30; 20 MG/100ML; MG/100ML; MG/100ML; MG/100ML
INJECTION, SOLUTION INTRAVENOUS CONTINUOUS PRN
Status: DISCONTINUED | OUTPATIENT
Start: 2023-01-01 | End: 2023-01-01

## 2023-01-01 RX ORDER — FENTANYL CITRATE 50 UG/ML
INJECTION, SOLUTION INTRAMUSCULAR; INTRAVENOUS PRN
Status: DISCONTINUED | OUTPATIENT
Start: 2023-01-01 | End: 2023-01-01

## 2023-01-01 RX ORDER — ACETAMINOPHEN 500 MG
500 TABLET ORAL EVERY 6 HOURS PRN
Status: DISCONTINUED | OUTPATIENT
Start: 2023-01-01 | End: 2023-01-01 | Stop reason: HOSPADM

## 2023-01-01 RX ORDER — ONDANSETRON 2 MG/ML
INJECTION INTRAMUSCULAR; INTRAVENOUS PRN
Status: DISCONTINUED | OUTPATIENT
Start: 2023-01-01 | End: 2023-01-01

## 2023-01-01 RX ORDER — FENTANYL CITRATE 0.05 MG/ML
50 INJECTION, SOLUTION INTRAMUSCULAR; INTRAVENOUS
Status: DISCONTINUED | OUTPATIENT
Start: 2023-01-01 | End: 2023-01-01

## 2023-01-01 RX ORDER — LEVOFLOXACIN 250 MG/1
250 TABLET, FILM COATED ORAL DAILY
Qty: 30 TABLET | Refills: 3 | Status: SHIPPED | OUTPATIENT
Start: 2023-01-01

## 2023-01-01 RX ORDER — ONDANSETRON 4 MG/1
4 TABLET, ORALLY DISINTEGRATING ORAL EVERY 6 HOURS PRN
Status: DISCONTINUED | OUTPATIENT
Start: 2023-01-01 | End: 2023-01-01 | Stop reason: HOSPADM

## 2023-01-01 RX ORDER — FENTANYL CITRATE 0.05 MG/ML
50 INJECTION, SOLUTION INTRAMUSCULAR; INTRAVENOUS EVERY 5 MIN PRN
Status: DISCONTINUED | OUTPATIENT
Start: 2023-01-01 | End: 2023-01-01 | Stop reason: HOSPADM

## 2023-01-01 RX ORDER — PROPOFOL 10 MG/ML
INJECTION, EMULSION INTRAVENOUS CONTINUOUS PRN
Status: DISCONTINUED | OUTPATIENT
Start: 2023-01-01 | End: 2023-01-01

## 2023-01-01 RX ORDER — CEFAZOLIN SODIUM/WATER 2 G/20 ML
2 SYRINGE (ML) INTRAVENOUS SEE ADMIN INSTRUCTIONS
Status: DISCONTINUED | OUTPATIENT
Start: 2023-01-01 | End: 2023-01-01 | Stop reason: HOSPADM

## 2023-01-01 RX ORDER — PANTOPRAZOLE SODIUM 20 MG/1
20 TABLET, DELAYED RELEASE ORAL DAILY
Status: DISCONTINUED | OUTPATIENT
Start: 2023-01-01 | End: 2023-01-01 | Stop reason: HOSPADM

## 2023-01-01 RX ORDER — ACYCLOVIR 400 MG/1
400 TABLET ORAL 2 TIMES DAILY
Qty: 60 TABLET | Refills: 3 | Status: SHIPPED | OUTPATIENT
Start: 2023-01-01

## 2023-01-01 RX ORDER — LEVOFLOXACIN 250 MG/1
250 TABLET, FILM COATED ORAL DAILY
COMMUNITY
Start: 2023-01-01 | End: 2023-01-01

## 2023-01-01 RX ORDER — POLYETHYLENE GLYCOL 3350 17 G/17G
17 POWDER, FOR SOLUTION ORAL DAILY PRN
Status: DISCONTINUED | OUTPATIENT
Start: 2023-01-01 | End: 2023-01-01 | Stop reason: HOSPADM

## 2023-01-01 RX ORDER — LIDOCAINE HYDROCHLORIDE 20 MG/ML
INJECTION, SOLUTION INFILTRATION; PERINEURAL PRN
Status: DISCONTINUED | OUTPATIENT
Start: 2023-01-01 | End: 2023-01-01

## 2023-01-01 RX ORDER — PROPOFOL 10 MG/ML
INJECTION, EMULSION INTRAVENOUS PRN
Status: DISCONTINUED | OUTPATIENT
Start: 2023-01-01 | End: 2023-01-01

## 2023-01-01 RX ORDER — LEVOFLOXACIN 250 MG/1
250 TABLET, FILM COATED ORAL DAILY
Qty: 30 TABLET | Refills: 0 | Status: ON HOLD | OUTPATIENT
Start: 2023-01-01 | End: 2023-01-01

## 2023-01-01 RX ORDER — ACYCLOVIR 400 MG/1
400 TABLET ORAL 2 TIMES DAILY
Qty: 60 TABLET | Refills: 3 | Status: SHIPPED | OUTPATIENT
Start: 2023-01-01 | End: 2023-01-01

## 2023-01-01 RX ORDER — LANOLIN ALCOHOL/MO/W.PET/CERES
1 CREAM (GRAM) TOPICAL AT BEDTIME
COMMUNITY
Start: 2023-01-01 | End: 2023-01-01

## 2023-01-01 RX ORDER — SODIUM CHLORIDE 9 MG/ML
INJECTION, SOLUTION INTRAVENOUS CONTINUOUS
Status: DISCONTINUED | OUTPATIENT
Start: 2023-01-01 | End: 2023-01-01

## 2023-01-01 RX ORDER — ONDANSETRON 4 MG/1
4 TABLET, ORALLY DISINTEGRATING ORAL EVERY 30 MIN PRN
Status: DISCONTINUED | OUTPATIENT
Start: 2023-01-01 | End: 2023-01-01 | Stop reason: HOSPADM

## 2023-01-01 RX ORDER — ONDANSETRON 2 MG/ML
4 INJECTION INTRAMUSCULAR; INTRAVENOUS EVERY 30 MIN PRN
Status: DISCONTINUED | OUTPATIENT
Start: 2023-01-01 | End: 2023-01-01 | Stop reason: HOSPADM

## 2023-01-01 RX ORDER — TAMSULOSIN HYDROCHLORIDE 0.4 MG/1
0.4 CAPSULE ORAL DAILY
Status: DISCONTINUED | OUTPATIENT
Start: 2023-01-01 | End: 2023-01-01

## 2023-01-01 RX ORDER — AMOXICILLIN 500 MG/1
500 CAPSULE ORAL 2 TIMES DAILY
COMMUNITY
Start: 2023-01-01 | End: 2023-01-01

## 2023-01-01 RX ORDER — PROCHLORPERAZINE MALEATE 5 MG
5 TABLET ORAL EVERY 6 HOURS PRN
Status: DISCONTINUED | OUTPATIENT
Start: 2023-01-01 | End: 2023-01-01 | Stop reason: HOSPADM

## 2023-01-01 RX ORDER — PROCHLORPERAZINE 25 MG
12.5 SUPPOSITORY, RECTAL RECTAL EVERY 12 HOURS PRN
Status: DISCONTINUED | OUTPATIENT
Start: 2023-01-01 | End: 2023-01-01 | Stop reason: HOSPADM

## 2023-01-01 RX ORDER — DIPHENHYDRAMINE HCL 25 MG
25 CAPSULE ORAL ONCE
Status: DISCONTINUED | OUTPATIENT
Start: 2023-01-01 | End: 2023-01-01

## 2023-01-01 RX ORDER — POLYETHYLENE GLYCOL 3350 17 G/17G
17 POWDER, FOR SOLUTION ORAL
COMMUNITY
Start: 2023-01-01 | End: 2023-01-01

## 2023-01-01 RX ORDER — SODIUM CHLORIDE, SODIUM LACTATE, POTASSIUM CHLORIDE, CALCIUM CHLORIDE 600; 310; 30; 20 MG/100ML; MG/100ML; MG/100ML; MG/100ML
INJECTION, SOLUTION INTRAVENOUS CONTINUOUS
Status: DISCONTINUED | OUTPATIENT
Start: 2023-01-01 | End: 2023-01-01

## 2023-01-01 RX ORDER — SENNOSIDES 8.6 MG
650 CAPSULE ORAL EVERY 8 HOURS PRN
COMMUNITY

## 2023-01-01 RX ORDER — AMOXICILLIN 250 MG
1 CAPSULE ORAL 2 TIMES DAILY PRN
Status: DISCONTINUED | OUTPATIENT
Start: 2023-01-01 | End: 2023-01-01 | Stop reason: HOSPADM

## 2023-01-01 RX ORDER — ONDANSETRON 2 MG/ML
4 INJECTION INTRAMUSCULAR; INTRAVENOUS
Status: DISCONTINUED | OUTPATIENT
Start: 2023-01-01 | End: 2023-01-01 | Stop reason: HOSPADM

## 2023-01-01 RX ORDER — GLYCOPYRROLATE 0.2 MG/ML
INJECTION, SOLUTION INTRAMUSCULAR; INTRAVENOUS PRN
Status: DISCONTINUED | OUTPATIENT
Start: 2023-01-01 | End: 2023-01-01

## 2023-01-01 RX ORDER — CEFTRIAXONE 1 G/1
1 INJECTION, POWDER, FOR SOLUTION INTRAMUSCULAR; INTRAVENOUS ONCE
Status: DISCONTINUED | OUTPATIENT
Start: 2023-01-01 | End: 2023-01-01

## 2023-01-01 RX ORDER — CEFAZOLIN SODIUM/WATER 2 G/20 ML
2 SYRINGE (ML) INTRAVENOUS
Status: COMPLETED | OUTPATIENT
Start: 2023-01-01 | End: 2023-01-01

## 2023-01-01 RX ADMIN — Medication 2 G: at 08:53

## 2023-01-01 RX ADMIN — ACETAMINOPHEN 650 MG: 325 TABLET, FILM COATED ORAL at 08:15

## 2023-01-01 RX ADMIN — TAMSULOSIN HYDROCHLORIDE 0.4 MG: 0.4 CAPSULE ORAL at 09:49

## 2023-01-01 RX ADMIN — SODIUM CHLORIDE, POTASSIUM CHLORIDE, SODIUM LACTATE AND CALCIUM CHLORIDE: 600; 310; 30; 20 INJECTION, SOLUTION INTRAVENOUS at 08:49

## 2023-01-01 RX ADMIN — AZACITIDINE 75 MG: 100 INJECTION, POWDER, LYOPHILIZED, FOR SOLUTION INTRAVENOUS; SUBCUTANEOUS at 11:43

## 2023-01-01 RX ADMIN — CEFTRIAXONE SODIUM 1 G: 1 INJECTION, POWDER, FOR SOLUTION INTRAMUSCULAR; INTRAVENOUS at 20:20

## 2023-01-01 RX ADMIN — SODIUM CHLORIDE 1000 ML: 9 INJECTION, SOLUTION INTRAVENOUS at 13:57

## 2023-01-01 RX ADMIN — AZACITIDINE 53 MG: 100 INJECTION, POWDER, LYOPHILIZED, FOR SOLUTION INTRAVENOUS; SUBCUTANEOUS at 10:32

## 2023-01-01 RX ADMIN — ACETAMINOPHEN 500 MG: 500 TABLET ORAL at 09:49

## 2023-01-01 RX ADMIN — SODIUM CHLORIDE: 9 INJECTION, SOLUTION INTRAVENOUS at 19:43

## 2023-01-01 RX ADMIN — AZACITIDINE 75 MG: 100 INJECTION, POWDER, LYOPHILIZED, FOR SOLUTION INTRAVENOUS; SUBCUTANEOUS at 10:42

## 2023-01-01 RX ADMIN — PHENYLEPHRINE HYDROCHLORIDE 100 MCG: 10 INJECTION INTRAVENOUS at 09:09

## 2023-01-01 RX ADMIN — AZACITIDINE 75 MG: 100 INJECTION, POWDER, LYOPHILIZED, FOR SOLUTION INTRAVENOUS; SUBCUTANEOUS at 14:16

## 2023-01-01 RX ADMIN — DEXAMETHASONE SODIUM PHOSPHATE 4 MG: 4 INJECTION, SOLUTION INTRA-ARTICULAR; INTRALESIONAL; INTRAMUSCULAR; INTRAVENOUS; SOFT TISSUE at 09:09

## 2023-01-01 RX ADMIN — SODIUM CHLORIDE, POTASSIUM CHLORIDE, SODIUM LACTATE AND CALCIUM CHLORIDE: 600; 310; 30; 20 INJECTION, SOLUTION INTRAVENOUS at 13:15

## 2023-01-01 RX ADMIN — PHENYLEPHRINE HYDROCHLORIDE 50 MCG: 10 INJECTION INTRAVENOUS at 13:23

## 2023-01-01 RX ADMIN — ACETAMINOPHEN 650 MG: 325 TABLET ORAL at 10:30

## 2023-01-01 RX ADMIN — ACETAMINOPHEN 500 MG: 500 TABLET ORAL at 22:07

## 2023-01-01 RX ADMIN — AZACITIDINE 75 MG: 100 INJECTION, POWDER, LYOPHILIZED, FOR SOLUTION INTRAVENOUS; SUBCUTANEOUS at 11:07

## 2023-01-01 RX ADMIN — PHENYLEPHRINE HYDROCHLORIDE 100 MCG: 10 INJECTION INTRAVENOUS at 09:02

## 2023-01-01 RX ADMIN — AZACITIDINE 100 MG: 100 INJECTION, POWDER, LYOPHILIZED, FOR SOLUTION INTRAVENOUS; SUBCUTANEOUS at 14:27

## 2023-01-01 RX ADMIN — ACYCLOVIR 400 MG: 400 TABLET ORAL at 16:06

## 2023-01-01 RX ADMIN — PANTOPRAZOLE SODIUM 20 MG: 20 TABLET, DELAYED RELEASE ORAL at 16:06

## 2023-01-01 RX ADMIN — MAGNESIUM SULFATE HEPTAHYDRATE 2 G: 40 INJECTION, SOLUTION INTRAVENOUS at 08:30

## 2023-01-01 RX ADMIN — MAGNESIUM SULFATE HEPTAHYDRATE 2 G: 40 INJECTION, SOLUTION INTRAVENOUS at 10:40

## 2023-01-01 RX ADMIN — PANTOPRAZOLE SODIUM 20 MG: 20 TABLET, DELAYED RELEASE ORAL at 08:30

## 2023-01-01 RX ADMIN — ACYCLOVIR 400 MG: 400 TABLET ORAL at 21:02

## 2023-01-01 RX ADMIN — CEFTRIAXONE SODIUM 1 G: 1 INJECTION, POWDER, FOR SOLUTION INTRAMUSCULAR; INTRAVENOUS at 20:09

## 2023-01-01 RX ADMIN — ACYCLOVIR 400 MG: 400 TABLET ORAL at 20:21

## 2023-01-01 RX ADMIN — CEFTRIAXONE SODIUM 1 G: 1 INJECTION, POWDER, FOR SOLUTION INTRAMUSCULAR; INTRAVENOUS at 20:27

## 2023-01-01 RX ADMIN — SODIUM CHLORIDE 1000 ML: 9 INJECTION, SOLUTION INTRAVENOUS at 20:26

## 2023-01-01 RX ADMIN — AZACITIDINE 100 MG: 100 INJECTION, POWDER, LYOPHILIZED, FOR SOLUTION INTRAVENOUS; SUBCUTANEOUS at 11:56

## 2023-01-01 RX ADMIN — PROPOFOL 150 MG: 10 INJECTION, EMULSION INTRAVENOUS at 09:02

## 2023-01-01 RX ADMIN — PHENYLEPHRINE HYDROCHLORIDE 100 MCG: 10 INJECTION INTRAVENOUS at 09:06

## 2023-01-01 RX ADMIN — PHENYLEPHRINE HYDROCHLORIDE 50 MCG: 10 INJECTION INTRAVENOUS at 13:44

## 2023-01-01 RX ADMIN — AZACITIDINE 53 MG: 100 INJECTION, POWDER, LYOPHILIZED, FOR SOLUTION INTRAVENOUS; SUBCUTANEOUS at 11:39

## 2023-01-01 RX ADMIN — AZACITIDINE 100 MG: 100 INJECTION, POWDER, LYOPHILIZED, FOR SOLUTION INTRAVENOUS; SUBCUTANEOUS at 14:41

## 2023-01-01 RX ADMIN — PHENYLEPHRINE HYDROCHLORIDE 50 MCG: 10 INJECTION INTRAVENOUS at 13:37

## 2023-01-01 RX ADMIN — PROPOFOL 150 MCG/KG/MIN: 10 INJECTION, EMULSION INTRAVENOUS at 13:16

## 2023-01-01 RX ADMIN — AZACITIDINE 100 MG: 100 INJECTION, POWDER, LYOPHILIZED, FOR SOLUTION INTRAVENOUS; SUBCUTANEOUS at 10:23

## 2023-01-01 RX ADMIN — ACETAMINOPHEN 500 MG: 500 TABLET ORAL at 22:24

## 2023-01-01 RX ADMIN — PHENYLEPHRINE HYDROCHLORIDE 50 MCG: 10 INJECTION INTRAVENOUS at 13:40

## 2023-01-01 RX ADMIN — ACETAMINOPHEN 650 MG: 325 TABLET, FILM COATED ORAL at 21:34

## 2023-01-01 RX ADMIN — PHENYLEPHRINE HYDROCHLORIDE 50 MCG: 10 INJECTION INTRAVENOUS at 13:33

## 2023-01-01 RX ADMIN — SODIUM CHLORIDE: 9 INJECTION, SOLUTION INTRAVENOUS at 06:25

## 2023-01-01 RX ADMIN — AZACITIDINE 53 MG: 100 INJECTION, POWDER, LYOPHILIZED, FOR SOLUTION INTRAVENOUS; SUBCUTANEOUS at 14:38

## 2023-01-01 RX ADMIN — AZACITIDINE 53 MG: 100 INJECTION, POWDER, LYOPHILIZED, FOR SOLUTION INTRAVENOUS; SUBCUTANEOUS at 15:10

## 2023-01-01 RX ADMIN — AZACITIDINE 100 MG: 100 INJECTION, POWDER, LYOPHILIZED, FOR SOLUTION INTRAVENOUS; SUBCUTANEOUS at 11:25

## 2023-01-01 RX ADMIN — ACETAMINOPHEN 650 MG: 325 TABLET ORAL at 11:57

## 2023-01-01 RX ADMIN — GLYCOPYRROLATE 0.2 MG: 0.2 INJECTION, SOLUTION INTRAMUSCULAR; INTRAVENOUS at 09:22

## 2023-01-01 RX ADMIN — ACYCLOVIR 400 MG: 400 TABLET ORAL at 20:08

## 2023-01-01 RX ADMIN — DIPHENHYDRAMINE HYDROCHLORIDE 25 MG: 25 CAPSULE ORAL at 09:12

## 2023-01-01 RX ADMIN — DIPHENHYDRAMINE HYDROCHLORIDE 25 MG: 25 CAPSULE ORAL at 12:34

## 2023-01-01 RX ADMIN — ACYCLOVIR 400 MG: 400 TABLET ORAL at 08:29

## 2023-01-01 RX ADMIN — TAMSULOSIN HYDROCHLORIDE 0.4 MG: 0.4 CAPSULE ORAL at 10:30

## 2023-01-01 RX ADMIN — ONDANSETRON 4 MG: 2 INJECTION INTRAMUSCULAR; INTRAVENOUS at 09:25

## 2023-01-01 RX ADMIN — ACETAMINOPHEN 650 MG: 325 TABLET ORAL at 09:12

## 2023-01-01 RX ADMIN — ACETAMINOPHEN 650 MG: 325 TABLET ORAL at 12:34

## 2023-01-01 RX ADMIN — ACETAMINOPHEN 650 MG: 325 TABLET, FILM COATED ORAL at 11:07

## 2023-01-01 RX ADMIN — ACYCLOVIR 400 MG: 400 TABLET ORAL at 09:49

## 2023-01-01 RX ADMIN — ACETAMINOPHEN 650 MG: 325 TABLET ORAL at 13:03

## 2023-01-01 RX ADMIN — LIDOCAINE HYDROCHLORIDE 40 MG: 20 INJECTION, SOLUTION INFILTRATION; PERINEURAL at 09:02

## 2023-01-01 RX ADMIN — PANTOPRAZOLE SODIUM 20 MG: 20 TABLET, DELAYED RELEASE ORAL at 09:49

## 2023-01-01 RX ADMIN — PHENYLEPHRINE HYDROCHLORIDE 100 MCG: 10 INJECTION INTRAVENOUS at 09:22

## 2023-01-01 RX ADMIN — PANTOPRAZOLE SODIUM 20 MG: 20 TABLET, DELAYED RELEASE ORAL at 08:29

## 2023-01-01 RX ADMIN — AZACITIDINE 75 MG: 100 INJECTION, POWDER, LYOPHILIZED, FOR SOLUTION INTRAVENOUS; SUBCUTANEOUS at 11:24

## 2023-01-01 RX ADMIN — FENTANYL CITRATE 25 MCG: 50 INJECTION, SOLUTION INTRAMUSCULAR; INTRAVENOUS at 09:01

## 2023-01-01 RX ADMIN — AZACITIDINE 53 MG: 100 INJECTION, POWDER, LYOPHILIZED, FOR SOLUTION INTRAVENOUS; SUBCUTANEOUS at 11:42

## 2023-01-01 RX ADMIN — LIDOCAINE HYDROCHLORIDE 50 MG: 20 INJECTION, SOLUTION INFILTRATION; PERINEURAL at 13:17

## 2023-01-01 RX ADMIN — ACETAMINOPHEN 650 MG: 325 TABLET ORAL at 11:49

## 2023-01-01 RX ADMIN — ACYCLOVIR 400 MG: 400 TABLET ORAL at 03:00

## 2023-01-01 RX ADMIN — ACYCLOVIR 400 MG: 400 TABLET ORAL at 08:30

## 2023-01-01 RX ADMIN — ACETAMINOPHEN 500 MG: 500 TABLET ORAL at 08:29

## 2023-01-01 RX ADMIN — SODIUM CHLORIDE 1000 ML: 9 INJECTION, SOLUTION INTRAVENOUS at 11:14

## 2023-01-01 RX ADMIN — FILGRASTIM-SNDZ 300 MCG: 300 INJECTION, SOLUTION INTRAVENOUS; SUBCUTANEOUS at 15:56

## 2023-01-01 RX ADMIN — ONDANSETRON 4 MG: 2 INJECTION INTRAMUSCULAR; INTRAVENOUS at 13:17

## 2023-01-01 ASSESSMENT — PAIN SCALES - GENERAL
PAINLEVEL: NO PAIN (0)
PAINLEVEL: MILD PAIN (2)
PAINLEVEL: NO PAIN (0)
PAINLEVEL: NO PAIN (0)
PAINLEVEL: MODERATE PAIN (4)
PAINLEVEL: NO PAIN (0)
PAINLEVEL: MILD PAIN (2)
PAINLEVEL: NO PAIN (0)
PAINLEVEL: EXTREME PAIN (8)
PAINLEVEL: NO PAIN (0)
PAINLEVEL: MILD PAIN (2)
PAINLEVEL: NO PAIN (0)
PAINLEVEL: MILD PAIN (3)
PAINLEVEL: NO PAIN (0)
PAINLEVEL: MILD PAIN (2)
PAINLEVEL: NO PAIN (0)
PAINLEVEL: MILD PAIN (2)
PAINLEVEL: NO PAIN (0)
PAINLEVEL: NO PAIN (0)
PAINLEVEL: MODERATE PAIN (4)
PAINLEVEL: NO PAIN (0)
PAINLEVEL: MODERATE PAIN (4)
PAINLEVEL: NO PAIN (0)
PAINLEVEL: SEVERE PAIN (7)
PAINLEVEL: NO PAIN (0)
PAINLEVEL: NO PAIN (0)

## 2023-01-01 ASSESSMENT — ACTIVITIES OF DAILY LIVING (ADL)
ADLS_ACUITY_SCORE: 29
ADLS_ACUITY_SCORE: 29
ADLS_ACUITY_SCORE: 31
ADLS_ACUITY_SCORE: 30
ADLS_ACUITY_SCORE: 31
ADLS_ACUITY_SCORE: 31
ADLS_ACUITY_SCORE: 29
ADLS_ACUITY_SCORE: 31
ADLS_ACUITY_SCORE: 29
ADLS_ACUITY_SCORE: 31
ADLS_ACUITY_SCORE: 28
ADLS_ACUITY_SCORE: 30
ADLS_ACUITY_SCORE: 29
ADLS_ACUITY_SCORE: 31
ADLS_ACUITY_SCORE: 30
ADLS_ACUITY_SCORE: 31
ADLS_ACUITY_SCORE: 29
ADLS_ACUITY_SCORE: 30
ADLS_ACUITY_SCORE: 26
ADLS_ACUITY_SCORE: 31
DEPENDENT_IADLS:: TRANSPORTATION
ADLS_ACUITY_SCORE: 31
ADLS_ACUITY_SCORE: 35
ADLS_ACUITY_SCORE: 30
ADLS_ACUITY_SCORE: 31
ADLS_ACUITY_SCORE: 30
ADLS_ACUITY_SCORE: 31
ADLS_ACUITY_SCORE: 35
ADLS_ACUITY_SCORE: 31
ADLS_ACUITY_SCORE: 31
ADLS_ACUITY_SCORE: 35
ADLS_ACUITY_SCORE: 29
ADLS_ACUITY_SCORE: 29
ADLS_ACUITY_SCORE: 35
ADLS_ACUITY_SCORE: 29
ADLS_ACUITY_SCORE: 30
ADLS_ACUITY_SCORE: 29
ADLS_ACUITY_SCORE: 31
ADLS_ACUITY_SCORE: 28

## 2023-01-01 ASSESSMENT — COPD QUESTIONNAIRES
COPD: 1
COPD: 1

## 2023-01-04 NOTE — ANESTHESIA CARE TRANSFER NOTE
Patient: Bonny Raymundo    Procedure: Procedure(s):  Sigmoidoscopy flexible       Diagnosis: Diarrhea, unspecified type [R19.7]  Other specified bacterial intestinal infections [A04.8]  Diagnosis Additional Information: No value filed.    Anesthesia Type:   MAC     Note:    Oropharynx: oropharynx clear of all foreign objects and spontaneously breathing  Level of Consciousness: awake  Oxygen Supplementation: face mask  Supplemental oxygen: 6.  Independent Airway: airway patency satisfactory and stable  Dentition: dentition unchanged  Vital Signs Stable: post-procedure vital signs reviewed and stable  Report to RN Given: handoff report given  Patient transferred to: Phase II (endo)    Handoff Report: Identifed the Patient, Identified the Reponsible Provider, Reviewed the pertinent medical history, Discussed the surgical course, Reviewed Intra-OP anesthesia mangement and issues during anesthesia, Set expectations for post-procedure period and Allowed opportunity for questions and acknowledgement of understanding      Vitals:  Vitals Value Taken Time   BP 98/59 01/04/23 1350   Temp     Pulse 62 01/04/23 1350   Resp 25 01/04/23 1352   SpO2 100 % 01/04/23 1352   Vitals shown include unvalidated device data.    Electronically Signed By: KHADRA Hernandez CRNA  January 4, 2023  1:53 PM

## 2023-01-04 NOTE — ANESTHESIA PREPROCEDURE EVALUATION
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     Sensorineural hearing loss, unspecified      Sprain of ankle, unspecified site     L      Past Surgical History:   Procedure Laterality Date     ARTHRODESIS FOOT  11    Hallux valgus R-1st MP joint     BLEPHAROPLASTY BILATERAL  ,      BONE MARROW BIOPSY, BONE SPECIMEN, NEEDLE/TROCAR N/A 2016    Procedure: BIOPSY BONE MARROW;  Surgeon: Mu Morgan MD;  [facility]     BONE MARROW BIOPSY, BONE SPECIMEN, NEEDLE/TROCAR N/A 2016    Procedure: BIOPSY BONE MARROW;  Surgeon: Mu Morgan MD;  Location:  GI     C DEXA INTERPRETATION, AXIAL  03     CATARACT IOL, RT/LT Right      CATARACT IOL, RT/LT Left      COLONOSCOPY N/A 2018    Procedure: COLONOSCOPY;  colonoscopy;  Surgeon: Meliton Castrejon MD;  Location:  GI     EXCHANGE INTRAOCULAR LENS IMPLANT Right 2015    Procedure: EXCHANGE INTRAOCULAR LENS IMPLANT;  Surgeon: Garrett Dawson MD;  Location:  [facility]     PICC INSERTION Left 2017    5fr DL BioFlo PICC, 42cm (2cm external) in the L basilic vein w/ tip in the  SVC RA junction.     VITRECTOMY PARSPLANA WITH 23 GAUGE SYSTEM Right 2015    Procedure: VITRECTOMY PARSPLANA WITH 23 GAUGE SYSTEM;  Surgeon: Racheal Loyd MD;  Location:  EC     ZZC NONSPECIFIC PROCEDURE            ZZC NONSPECIFIC PROCEDURE      hysterectomy/BSO     ZZC NONSPECIFIC PROCEDURE      myomectomy (fibroids)     ZZHC COLONOSCOPY THRU STOMA, DIAGNOSTIC      normal- minimal diverticulosis      Allergies   Allergen Reactions     Cats      Dogs      Seasonal Allergies       Social History     Tobacco Use     Smoking status: Former     Types: Cigarettes     Quit date: 1973     Years since quittin.6     Smokeless tobacco: Never   Substance Use Topics     Alcohol use: No     Alcohol/week: 0.0 standard drinks     Comment: occasionally      Wt Readings from Last 1 Encounters:   22 43.2 kg (95 lb 4.8 oz)        Anesthesia Evaluation   Pt has had prior anesthetic. Type: General and MAC.    No history of anesthetic complications       ROS/MED HX  ENT/Pulmonary:     (+) allergic rhinitis, COPD,  (-) sleep apnea   Neurologic: Comment: BPPV      Cardiovascular:     (+) -----Previous cardiac testing   Echo: Date:  Results:     Name: LISSETH PARIKH  MRN: [id number]  : 1945  Study Date: 2020 01:01 PM  Age: 74 yrs  Gender: Female  Patient Location: [facility]  Reason For Study: OCASIO (dyspnea on exertion)  Ordering Physician: ELLE CLOUD  Referring Physician: ELLE CLOUD  Performed By: Leola Thrasher     BSA: 1.5 m2  Height: 60 in  Weight: 123 lb  HR: 60  _____________________________________________________________________________  __        Procedure  Complete Portable Echo Adult.  _____________________________________________________________________________  __        Interpretation Summary     Technically difficult study.  The visual ejection fraction is estimated at 55-60%.  The right ventricle is normal in size and function.  Unable to assess mean RA pressure due to technically difficult study.  Right ventricular systolic pressure could not be approximated due to  inadequate tricuspid regurgitation.  Mild-moderate AI. No AS.     No prior studies for  comparison.  _____________________________________________________________________________  __        Left Ventricle  The left ventricle is normal in size. There is borderline concentric left  ventricular hypertrophy. The visual ejection fraction is estimated at 55-60%.  Diastolic Doppler findings (E/E' ratio and/or other parameters) suggest left  ventricular filling pressures are indeterminate.     Right Ventricle  The right ventricle is normal in size and function.     Atria  Normal left atrial size. Right atrial size is normal.     Mitral Valve  There is mild mitral annular calcification. There is trace mitral  regurgitation. There is no mitral valve stenosis.        Tricuspid Valve  Right ventricular systolic pressure could not be approximated due to  inadequate tricuspid regurgitation. There is trace to mild tricuspid  regurgitation.     Aortic Valve  The aortic valve is trileaflet with aortic valve sclerosis. There is mild to  moderate (1-2+) aortic regurgitation. No hemodynamically significant valvular  aortic stenosis.     Pulmonic Valve  The pulmonic valve is not well visualized.     Vessels  The aortic root is normal size. Normal size ascending aorta. Unable to assess  mean RA pressure due to technically difficult study.     Pericardium  There is no pericardial effusion.     _____________________________________________________________________________  __  MMode/2D Measurements & Calculations  IVSd: 1.1 cm  LVIDd: 4.1 cm  LVIDs: 2.5 cm  LVPWd: 0.92 cm  FS: 39.9 %     LV mass(C)d: 129.5 grams  LV mass(C)dI: 85.3 grams/m2  Ao root diam: 3.3 cm  LA dimension: 3.2 cm  asc Aorta Diam: 3.3 cm  LA/Ao: 0.98  LA Volume (BP): 43.0 ml  LA Volume Index (BP): 28.3 ml/m2  RWT: 0.45        Doppler Measurements & Calculations  MV E max gabby: 47.6 cm/sec  MV A max gabby: 73.2 cm/sec  MV E/A: 0.65     MV dec time: 0.23 sec  AI P1/2t: 566.2 msec  PA acc time: 0.10 sec  E/E' av.0  Lateral E/e': 4.9  Medial E/e': 13.0            _____________________________________________________________________________  __           Report approved by: Jericho Victor 12/08/2020 02:09 PM           Specimen Collected: 12/08/20 13:01 Last Resulted: 12/08/20 13:01      Order Details     View Encounter     Lab and Collection Details     Routing     Result History    View Encounter Conversation        Linked Documents      View Image    Result Care Coordination      Result Notes     Taylor Boateng MD   12/8/2020 10:40 PM CST Back to Top    Please notify patient by sending following letter           Stress Test: Date: Results:    ECG Reviewed: Date: Results:    Cath: Date: Results:   (-) hypertension and CAD   METS/Exercise Tolerance:     Hematologic: Comments: Pancytopenia    (+) History of blood clots, pt is anticoagulated, anemia,     Musculoskeletal:       GI/Hepatic:     (+) GERD,  (-) liver disease   Renal/Genitourinary:     (+) renal disease, type: CRI,     Endo:     (+) thyroid problem, hypothyroidism,  (-) Type I DM and Type II DM   Psychiatric/Substance Use:       Infectious Disease:       Malignancy:       Other:            Physical Exam    Airway        Mallampati: III   TM distance: > 3 FB   Neck ROM: full   Mouth opening: > 3 cm    Respiratory Devices and Support         Dental  no notable dental history         Cardiovascular          Rhythm and rate: regular and normal   (+) murmur       Pulmonary           breath sounds clear to auscultation           OUTSIDE LABS:  CBC:   Lab Results   Component Value Date    WBC 2.3 (L) 12/29/2022    WBC 2.0 (L) 12/15/2022    HGB 8.4 (L) 12/29/2022    HGB 7.2 (L) 12/15/2022    HCT 28.3 (L) 12/29/2022    HCT 24.2 (L) 12/15/2022    PLT 66 (L) 12/29/2022    PLT 59 (L) 12/15/2022     BMP:   Lab Results   Component Value Date     12/29/2022     10/05/2022    POTASSIUM 4.0 12/29/2022    POTASSIUM 3.6 10/05/2022    CHLORIDE 102 12/29/2022    CHLORIDE 103 10/05/2022    CO2 24 12/29/2022    CO2 24  10/05/2022    BUN 21.9 12/29/2022    BUN 22.9 10/05/2022    CR 1.15 (H) 12/29/2022    CR 1.19 (H) 10/05/2022     (H) 12/29/2022     (H) 10/05/2022     COAGS:   Lab Results   Component Value Date    PTT 32 09/10/2020    INR 1.73 (H) 04/14/2022     POC: No results found for: BGM, HCG, HCGS  HEPATIC:   Lab Results   Component Value Date    ALBUMIN 2.6 (L) 12/29/2022    PROTTOTAL 6.9 12/29/2022    ALT 8 (L) 12/29/2022    AST 26 12/29/2022    ALKPHOS 143 (H) 12/29/2022    BILITOTAL 0.7 12/29/2022     OTHER:   Lab Results   Component Value Date    PH 7.36 09/11/2012    LACT 3.5 (H) 01/09/2017    LEO 8.8 12/29/2022    PHOS 2.5 01/25/2017    MAG 1.8 06/02/2022    AMYLASE 33 03/26/2004    TSH 1.97 07/26/2022    SED 73 (H) 12/07/2021       Anesthesia Plan    ASA Status:  3   NPO Status:  NPO Appropriate    Anesthesia Type: MAC.     - Reason for MAC: chronic cardiopulmonary disease              Consents    Anesthesia Plan(s) and associated risks, benefits, and realistic alternatives discussed. Questions answered and patient/representative(s) expressed understanding.    - Discussed:     - Discussed with:  Patient      - Extended Intubation/Ventilatory Support Discussed: No.      - Patient is DNR/DNI Status: No    Use of blood products discussed: No .     Postoperative Care       PONV prophylaxis: Ondansetron (or other 5HT-3)     Comments:                Rick White MD

## 2023-01-04 NOTE — PROGRESS NOTES
GI progress note:    The patient's son accompanied her today. He feels strongly she should not have the upper endoscopy (he feels the risk outweighs the benefit). He also feels the colonoscopy should not be done (again he is concerned about the risk of perforation). Discussion about the indication of the procedures (to evaluate for reason for weight loss and chronic loose stools) as well as risk of bleeding or perforation or infection. As she has had a colonoscopy in 2018 with no polyps I do not think a full colonoscopy is necessary. We can do a flexible sigmoidoscopy with biopsies. We discussed that without the EGD I cannot say for sure that there is not an upper GI malignancy as the cause of the weight loss (though I think this is unlikely). Given the son's concerns and after extensive discussion with the son and patient we have agreed to move forward with only the flexible sigmoidoscopy with biopsies (to evaluate for microscopic colitis).    The patient and her son are in agreement with this plan.    Sean Dawson MD

## 2023-01-04 NOTE — H&P
Bonny Raymundo  [id number]  female  77 year old      Reason for procedure/surgery: weight loss and loose stools - chronic    Patient Active Problem List   Diagnosis     DIARRHEA     Esophageal reflux     Chest pain     Osteoporosis     CARDIOVASCULAR SCREENING; LDL GOAL LESS THAN 160     Seasonal allergic rhinitis     Health Care Home     Sensorineural hearing loss of both ears     BPPV (benign paroxysmal positional vertigo)     Anemia     DVT, recurrent, lower extremity, acute (H)     Advanced directives, counseling/discussion     Pancytopenia (H)     Aplastic anemia (H)     Thyroid nodule     History of pulmonary embolism     Diplopia     Pseudophakia     Myopia of both eyes     Osteoporotic compression fracture of spine, with routine healing, subsequent encounter     Personal history of other drug therapy     Dyspnea, unspecified type     CKD (chronic kidney disease) stage 4, GFR 15-29 ml/min (H)     Anemia of chronic renal failure, stage 4 (severe) (H)     Urinary problem     Intertriginous candidiasis     Cellulitis, unspecified cellulitis site     Vitamin D deficiency     Loss of balance     Post herpetic neuralgia     Intertrigo labialis     Itching     High priority for 2019-nCoV vaccine     Need for prophylactic vaccination and inoculation against influenza     Other neutropenia (H)     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     BONE MARROW BIOPSY, BONE SPECIMEN, NEEDLE/TROCAR N/A 11/21/2016    Procedure: BIOPSY BONE MARROW;  Surgeon: Mu Morgan MD;  Location:  GI     C DEXA INTERPRETATION, AXIAL  8-20-03     CATARACT IOL, RT/LT Right       CATARACT IOL, RT/LT Left      COLONOSCOPY N/A 2018    Procedure: COLONOSCOPY;  colonoscopy;  Surgeon: Meliton Castrejon MD;  Location:  GI     EXCHANGE INTRAOCULAR LENS IMPLANT Right 2015    Procedure: EXCHANGE INTRAOCULAR LENS IMPLANT;  Surgeon: Garrett Dawson MD;  Location:  [facility]     PICC INSERTION Left 2017    5fr DL BioFlo PICC, 42cm (2cm external) in the L basilic vein w/ tip in the  SVC RA junction.     VITRECTOMY PARSPLANA WITH 23 GAUGE SYSTEM Right 2015    Procedure: VITRECTOMY PARSPLANA WITH 23 GAUGE SYSTEM;  Surgeon: Racheal Loyd MD;  Location:  EC     ZZC NONSPECIFIC PROCEDURE           ZZC NONSPECIFIC PROCEDURE      hysterectomy/BSO     ZZC NONSPECIFIC PROCEDURE      myomectomy (fibroids)     ZZHC COLONOSCOPY THRU STOMA, DIAGNOSTIC      normal- minimal diverticulosis       Past Medical History:   Past Medical History:   Diagnosis Date     Allergic rhinitis due to other allergen      Aplastic anemia (H) 2017     Closed anterior dislocation of humerus      DVT of lower extremity (deep venous thrombosis) (H) 10/2012    Left after prolonged sitting-plane     DVT, recurrent, lower extremity, acute (H)      Headache(784.0)      Osteoporosis, unspecified      Pulmonary emboli (H) 10/2012     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     Sensorineural hearing loss, unspecified      Sprain of ankle, unspecified site     L       Social History:   Social History     Tobacco Use     Smoking status: Former     Types: Cigarettes     Quit date: 1973     Years since quittin.6     Smokeless tobacco: Never   Substance Use Topics     Alcohol use: No     Alcohol/week: 0.0 standard drinks     Comment: occasionally       Family History:   Family History   Problem Relation Age of Onset     Musculoskeletal Disorder Mother         MS     Heart Disease Father      Heart Disease Brother         afib       Allergies:    Allergies   Allergen Reactions     Cats      Dogs      Seasonal Allergies        Active Medications:   No current outpatient medications on file.       Systemic Review:   CONSTITUTIONAL: NEGATIVE for fever, chills, change in weight  ENT/MOUTH: NEGATIVE for ear, mouth and throat problems  RESP: NEGATIVE for significant cough or SOB  CV: NEGATIVE for chest pain, palpitations or peripheral edema    Physical Examination:   Vital Signs: There were no vitals taken for this visit.  GENERAL: healthy, alert and no distress  NECK: no adenopathy, no asymmetry, masses, or scars  RESP: lungs clear to auscultation - no rales, rhonchi or wheezes  CV: regular rate and rhythm, normal S1 S2, no S3 or S4, no murmur, click or rub, no peripheral edema and peripheral pulses strong  ABDOMEN: soft, nontender, no hepatosplenomegaly, no masses and bowel sounds normal  MS: no gross musculoskeletal defects noted, no edema    ASA Classification: (III)  Severe systemic disease  Airway Exam: Mallampati Score: Class III (Visualization of only the base of uvula)    Plan: Appropriate to proceed as scheduled.    The patient's son accompanied her today. He feels strongly she should not have the upper endoscopy (he feels the risk outweighs the benefit). He also feels the colonoscopy should not be done (again he is concerned about the risk of perforation). Discussion about the indication of the procedures (to evaluate for reason for weight loss and chronic loose stools) as well as risk of bleeding or perforation or infection. As she has had a colonoscopy in 2018 with no polyps I do no think a full colonoscopy is necessary. We can do a flexible sigmoidoscopy with biopsies. We discussed that without the EGD I cannot say for sure that there is not an upper GI malignancy as the cause of the weight loss (though I think this is unlikely). Given the son's concerns and after extensive discussion with the son and patient we have agreed to move forward with  only the flexible sigmoidoscopy with biopsies (to evaluate for microscopic colitis).      Sean Dawson MD  1/4/2023    PCP:  Shelli Cash

## 2023-01-04 NOTE — INTERVAL H&P NOTE
"I have reviewed the surgical (or preoperative) H&P that is linked to this encounter, and examined the patient. There are no significant changes    Clinical Conditions Present on Arrival:  Clinically Significant Risk Factors Present on Admission                # Hypoalbuminemia: Lowest albumin = 2.6 g/dL in the past 30 days , will monitor as appropriate   # Drug Induced Coagulation Defect: home medication list includes an anticoagulant medication  # Thrombocytopenia: Lowest platelets = 59 in last 30 days, will monitor for bleeding  # Cachexia: Estimated body mass index is 17.42 kg/m  as calculated from the following:    Height as of 12/21/22: 1.575 m (5' 2.01\").    Weight as of 12/21/22: 43.2 kg (95 lb 4.8 oz).       " Called patient and informed of message below and is retiring today, and starting part time job next week and does not know schedule, will call back on Monday and make appointment. Nothing else needed.         Shirley FINK CMA ([facility])

## 2023-01-06 NOTE — TELEPHONE ENCOUNTER
Contacted patient per Dr Dawson request   Patient underwent a flex sig on the 4th of January  Left a message   Patient has a diagnosis of colitis   Dr Dawson would recommended budesonide but needs to check with Dr Spann (see provider for aplastic anemia)    Talked to son of patient and states mother does not normally hear her phone   Son will reach out to son to have patient calls to discuss how she is doing after her flex.

## 2023-01-09 NOTE — TELEPHONE ENCOUNTER
Left a message for patient again   Did talk to her son as he did reach her and she is eating and drinking    Patient aware that she has appointment  and labs with Dr Spann   \Son does states patient is hard of hearing but also takes  Frequent napes   Son states she does not answers his calls so he goes to check in on her  Son does have your number to call if any issues   Dr Dawson updated.

## 2023-01-11 NOTE — LETTER
1/11/2023         RE: Bonny Raymundo  [address]  [facility] [id number]        Dear Colleague,    Thank you for referring your patient, Bonny Raymundo, to the [facility]. Please see a copy of my visit note below.    Hematology clinic visit  In person visit     Problem list:  - Myelodysplastic syndrome with excess blasts-2 (MDS-EB 2).  Bone marrow biopsy 12/15/2022.   IPSS-R score 8.5-very high - based on cytogenetics -7, > 10% blasts, hemoglobin less than 8, platelet , ANC less than 0.8    Final Diagnosis   A.  Bone Marrow Biopsy from left posterior iliac crest and peripheral smear morphology:  - Myelodysplastic syndrome with excess blasts 2 (MDS-EB2) showing:       - Increased marrow blasts (13.3% on imprint smear differential, 12% on flow cytometry).       - Mildly hypercellular marrow for age , 30-35% .      - Moderately increased marrow fibrosis (MF-2).      - No appreciable dysplasia.      - Increased marrow storage iron with 84% sideroblasts, 6% ringed sideroblasts      - Peripheral blood pancytopenia showing:            - Moderate macrocytic, normochromic anemia without evidence of hemolysis or increased red cell regeneration, rare circulating nucleated red blood cell.           - Moderate leukopenia with rare circulating blasts without Bernice rods.           - Moderate to marked thrombocytopenia.   Electronically signed by Rick Bangura MD on 12/19/2022    Cytogenetics-46,XX,-7,+21[20]  Detected Alterations of Known or Potential Pathogenicity: RUNX1 G199E     Detected Alterations of Uncertain Significance: None     Genes with No Detected Clinically Significant Alterations: ABL1, ALK, ASXL1, ATRX, BCOR, CALR, CBL, CEBPA, CSF3R, DNMT3A, ETV6, EZH2, FLT3, GATA1, GATA2, HRAS, IDH1, IDH2, JAK1, JAK2, JAK3, KIT, KMT2A, KRAS, MPL, NF1, NPM1, NRAS, PDGFRA, PDGFRB, PHF6, PTPN11, JAMA, SETBP1, SF1, SF3A1, SF3B1, SH2B3, SRSF2, TET2, TP53, U2AF2, WT1, ZRSR2    She  was initially diagnosed as aplastic anemia-diagnosed 9/26/2016, repeat bone marrow biopsy 12/22/16- bone marrow <5% cellular with no dysplasia.  Normal cytogenetics. PNH negative. 1/9/ 2017-treated with ATG, methylprednisolone, cyclosporine, and eltrombopag.  Cyclosporine discontinued May 2021, eltrombopag discontinued June 2021 due to abnormal liver function test.    Partial remission with the immunotherapy, which is gradually weaned.  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  - Shingles right face  2022     Interval history:  is here for follow-up of bone marrow biopsy results.  She is by herself.  They show that the presumed aplastic anemia has evolved into MDS-EB2.  Her IPSS R score is 8.5, considered very high risk.  She was also recently been diagnosed with lymphocytic colitis, possible cause of her weight loss.  Budesonide has been recommended, but she has not started that yet.    Today she says that she is feeling quite fatigued, and suspects that she needs a transfusion.  She denies shortness of breath.  She says she takes a nap every morning and sometimes takes a nap in the afternoon.  She sleeps about 9 hours at night, and does not have difficulty sleeping at night.  She is able to dress herself and shower without assistance.  He does not do any housekeeping.  She says her appetite is okay but she does not eat much.  She says she eats 3 meals a day.  She does her own cooking, which is mainly microwave foods.  She is not taking any sort of high-protein  supplement.     She denies any mouth sores, no coughing or wheezing or chest pain, no abdominal pain diarrhea, no dysuria, no joint pain, no bleeding symptoms.    She has a son Shakeel krueger, who apparently lives in the area and she would want to make health decisions for her if she is unable to.  She also has a brother Justo Carl.       PHYSICAL EXAMINATION:  BP 92/60 (BP Location: Right arm, Patient Position: Standing, Cuff Size: Adult Small)   Pulse 102   Temp 97  F (36.1  C) (Tympanic)   Resp 16   Wt 42.2 kg (93 lb 1.6 oz)   SpO2 100%   BMI 17.59 kg/m      General appearance:  Patient is frail 77 year old woman in no acute distress cachectic.   She is able to climb onto exam table without assistance.  HEENT:  No pallor, icterus, or mucositis.  No thyromegaly.    Lungs:  Clear to auscultation bilaterally.   Heart:  Regular rate and rhythm; no S3 S4 or murmer.     Abdomen:  Positive bowel sounds, soft and nontender, nondistended.  No hepatomegaly. No splenomegaly appreciated.    Extremities:  No joint swelling or tenderness.  No ankle edema.     Skin:  No rash, no petechiae or ecchymoses.    Labs:   Latest Reference Range & Units 01/11/23 11:04   Sodium 136 - 145 mmol/L 137   Potassium 3.4 - 5.3 mmol/L 4.1   Chloride 98 - 107 mmol/L 104   Carbon Dioxide (CO2) 22 - 29 mmol/L 25   Urea Nitrogen 8.0 - 23.0 mg/dL 26.1 (H)   Creatinine 0.51 - 0.95 mg/dL 1.07 (H)   GFR Estimate >60 mL/min/1.73m2 53 (L)   Calcium 8.8 - 10.2 mg/dL 8.8   Anion Gap 7 - 15 mmol/L 8   Albumin 3.5 - 5.2 g/dL 2.7 (L)   Protein Total 6.4 - 8.3 g/dL 6.8   Alkaline Phosphatase 35 - 104 U/L 99   ALT 10 - 35 U/L <5 (L)   AST 10 - 35 U/L 14   Bilirubin Total <=1.2 mg/dL 0.5   Ferritin 11 - 328 ng/mL 1,044 (H)   Glucose 70 - 99 mg/dL 107 (H)   Iron 37 - 145 ug/dL 113   Iron Binding Capacity  See Comment   Iron Sat Index  See Comment   Lactate Dehydrogenase 0 - 250 U/L 210   Uric Acid 2.4 - 5.7 mg/dL 6.6 (H)   WBC 4.0 - 11.0 10e3/uL 1.6 (L)  "  Hemoglobin 11.7 - 15.7 g/dL 7.1 (L)   Hematocrit 35.0 - 47.0 % 23.5 (L)   Platelet Count 150 - 450 10e3/uL 58 (L)   RBC Count 3.80 - 5.20 10e6/uL 2.29 (L)   MCV 78 - 100 fL 103 (H)   MCH 26.5 - 33.0 pg 31.0   MCHC 31.5 - 36.5 g/dL 30.2 (L)   RDW 10.0 - 15.0 % 23.5 (H)   % Neutrophils % 30   % Lymphocytes % 59   % Monocytes % 11   % Eosinophils % 0   % Basophils % 0   Absolute Basophils 0.0 - 0.2 10e3/uL 0.0   NRBC/W <=0 % 1 (H)   Absolute Neutrophil 1.6 - 8.3 10e3/uL 0.5 (L)   Absolute Lymphocytes 0.8 - 5.3 10e3/uL 0.9   Absolute Monocytes 0.0 - 1.3 10e3/uL 0.2   Absolute Eosinophils 0.0 - 0.7 10e3/uL 0.0   Absolute NRBCs <=0.0 10e3/uL 0.0   (H): Data is abnormally high  (L): Data is abnormally low      Assessment and recommendation:    # MDS-EB 2- IPSS-R score very high risk-this means her median overall survival is 0.8 years and it 25% chance of developing acute leukemia within 0.7 years.  I discussed the bone marrow biopsy results with Bonny.  I explained that her bone marrow now is different than when she was diagnosed with the aplastic anemia several years ago.  This has now become a \"preleukemia.\"  There is treatment for this to try to help the anemia so that she does not need blood transfusions and help the white blood cell count and platelet count.  The treatment has about a 50% chance of improving her blood counts over it while of at least 4 cycles.  Unfortunately the treatment is not curative, and eventually this will turn into leukemia.  Her performance status is Karnofsky 50.  I think it is worth trying azacitidine, which she can receive at home, but I would have a low threshold for stopping the azacitidine.    Azacitidine is given subcutaneous once a day for 5 days every 28 days.  The main risk factor for it is that the blood counts will all drop low and he will be at risk for infection, thrombocytopenia and anemia, and transfusions may be needed. Antibiotics may be needed if there is a fever >100.5 or " other signs of infection.  Mild nausea that may occur.  We give anti-nausea medications with it.  There can be some irritation at the subcutaneous infusion site.  It takes at least 2 cycles and possibly up to 5 cycles to see a response.  I gave her written information about azacitidine.  I will go ahead and write the order for it, because it will take a while for preauthorization and home care set up, so she will have some time to think about it.  I offered to call her son to discuss this with him, and she agreed to that, but needs to get me his phone number.  -Azacitidine 75 mg per metered squared subcutaneous daily x5 q. 28 days- resume home care  - Start allopurinol 100 mg daily    #Anemia-this is due to mainly to the MDS.  She has not responded to Aranesp.  -Transfuse 1 unit of packed red cells today  - Stop Aranesp    #Failure to thrive- this may be due to the lymphocytic colitis.  In managing her care, it must be remembered that she weighs only 42 kg, so she should not be overdosed with medications.  Next visit, discussion about high-protein supplements    #Lymphocytic colitis-from my standpoint, it is okay for her to be on budesonide.  She should talk with her gastroenterologist    #CODE STATUS- I discussed this briefly with her.  Given all of her medical conditions, if she were to end up in the intensive care unit intubated, she would not survive to leave the hospital.  I recommended that she have a DO NOT RESUSCITATE order and she agrees with that.  On her next visit, we can discuss further whether she wants POLST orders.  At this point I think it is reasonable to continue supportive therapies and the azacitidine which is palliative, but if she is not tolerating these, then she could be considered for hospice.  We did not discuss hospice today.    Time: I spent a total of 90  minutes on the day of the visit. Please see the note for further information on patient assessment and treatment.       Minerva LAURENT  MD [facility]  Hematology

## 2023-01-11 NOTE — NURSING NOTE
"Oncology Rooming Note    January 11, 2023 11:16 AM   Bonny Raymundo is a 77 year old female who presents for:    Chief Complaint   Patient presents with     Blood Draw     Labs collected from venipuncture by RN. Vitals taken. Checked in for appointment(s).      Oncology Clinic Visit     Aplastic anemia        Initial Vitals: BP 92/60 (BP Location: Right arm, Patient Position: Standing, Cuff Size: Adult Small)   Pulse 102   Temp 97  F (36.1  C) (Tympanic)   Resp 16   Wt 42.2 kg (93 lb 1.6 oz)   SpO2 100%   BMI 17.59 kg/m   Estimated body mass index is 17.59 kg/m  as calculated from the following:    Height as of 1/4/23: 1.549 m (5' 1\").    Weight as of this encounter: 42.2 kg (93 lb 1.6 oz). Body surface area is 1.35 meters squared.  No Pain (0) Comment: Data Unavailable   No LMP recorded. Patient has had a hysterectomy.  Allergies reviewed: Yes  Medications reviewed: Yes    Medications: Medication refills not needed today.  Pharmacy name entered into [facility]:    [facility] [place] - [address], [address]  [facility] [place], [place] - [address] [id number] - [place], [place] - [address] [facility]    Clinical concerns: Pt BP is 98/67 sitting and 92/60 standing. States she has been feeling dizzy and lightheaded and it happens occasionally. Happens more when she stands up and walks a bit. Feeling tired and fatigued. States has had less fluid intake as of this AM. Dr. Spann was notified of BP readings.          [facility]            "

## 2023-01-11 NOTE — PROGRESS NOTES
[facility]: Cancer Care                                                                                        Met with pt this am with Dr Spann in clinic.  Dr Spann went over azacitadine information with pt and RN reiterated information to pt.  Pt received handouts on medication.      Spoke with [facility] this afternoon and arranged for pt to receive 1 unit of blood tomorrow afternoon (1/12/23).  T&S drawn today in clinic and consent signed.  Will fax copy of consent to Teresa at [phone].  Pt stated understanding and is able to make appt tomorrow.     Pt also wanted Dr Spann to be aware that she is allowed to call pt's son Shakeel and let him know about her newest diagnosis.  Verified number with pt.      Signature:  Sue Marquez RN

## 2023-01-11 NOTE — NURSING NOTE
Chief Complaint   Patient presents with     Blood Draw     Labs collected from venipuncture by RN. Vitals taken. Checked in for appointment(s).      Becka LEES RN PHN BSN  BMT/Oncology Lab

## 2023-01-11 NOTE — PROGRESS NOTES
Hematology clinic visit  In person visit     Problem list:  - Myelodysplastic syndrome with excess blasts-2 (MDS-EB 2).  Bone marrow biopsy 12/15/2022.   IPSS-R score 8.5-very high - based on cytogenetics -7, > 10% blasts, hemoglobin less than 8, platelet , ANC less than 0.8    Final Diagnosis   A.  Bone Marrow Biopsy from left posterior iliac crest and peripheral smear morphology:  - Myelodysplastic syndrome with excess blasts 2 (MDS-EB2) showing:       - Increased marrow blasts (13.3% on imprint smear differential, 12% on flow cytometry).       - Mildly hypercellular marrow for age , 30-35% .      - Moderately increased marrow fibrosis (MF-2).      - No appreciable dysplasia.      - Increased marrow storage iron with 84% sideroblasts, 6% ringed sideroblasts      - Peripheral blood pancytopenia showing:            - Moderate macrocytic, normochromic anemia without evidence of hemolysis or increased red cell regeneration, rare circulating nucleated red blood cell.           - Moderate leukopenia with rare circulating blasts without Bernice rods.           - Moderate to marked thrombocytopenia.   Electronically signed by Rick Bangura MD on 12/19/2022    Cytogenetics-46,XX,-7,+21[20]  Detected Alterations of Known or Potential Pathogenicity: RUNX1 G199E     Detected Alterations of Uncertain Significance: None     Genes with No Detected Clinically Significant Alterations: ABL1, ALK, ASXL1, ATRX, BCOR, CALR, CBL, CEBPA, CSF3R, DNMT3A, ETV6, EZH2, FLT3, GATA1, GATA2, HRAS, IDH1, IDH2, JAK1, JAK2, JAK3, KIT, KMT2A, KRAS, MPL, NF1, NPM1, NRAS, PDGFRA, PDGFRB, PHF6, PTPN11, JAMA, SETBP1, SF1, SF3A1, SF3B1, SH2B3, SRSF2, TET2, TP53, U2AF2, WT1, ZRSR2    She was initially diagnosed as aplastic anemia-diagnosed 9/26/2016, repeat bone marrow biopsy 12/22/16- bone marrow <5% cellular with no dysplasia.  Normal cytogenetics. PNH negative. 1/9/ 2017-treated with ATG, methylprednisolone, cyclosporine, and eltrombopag.   Cyclosporine discontinued May 2021, eltrombopag discontinued June 2021 due to abnormal liver function test.    Partial remission with the immunotherapy, which is gradually weaned.  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  - Shingles right face  2022     Interval history:  is here for follow-up of bone marrow biopsy results.  She is by herself.  They show that the presumed aplastic anemia has evolved into MDS-EB2.  Her IPSS R score is 8.5, considered very high risk.  She was also recently been diagnosed with lymphocytic colitis, possible cause of her weight loss.  Budesonide has been recommended, but she has not started that yet.    Today she says that she is feeling quite fatigued, and suspects that she needs a transfusion.  She denies shortness of breath.  She says she takes a nap every morning and sometimes takes a nap in the afternoon.  She sleeps about 9 hours at night, and does not have difficulty sleeping at night.  She is able to dress herself and shower without assistance.  He does not do any housekeeping.  She says her appetite is okay but she does not eat much.  She says she eats 3 meals a day.  She does her own cooking, which is mainly microwave foods.  She is not taking any sort of high-protein supplement.     She denies any mouth sores, no coughing or wheezing or chest pain, no abdominal pain diarrhea, no dysuria, no joint pain, no bleeding symptoms.    She has a son Shakeel krueger, who apparently lives in the area and she would want to make health decisions  for her if she is unable to.  She also has a brother Justo Carl.       PHYSICAL EXAMINATION:  BP 92/60 (BP Location: Right arm, Patient Position: Standing, Cuff Size: Adult Small)   Pulse 102   Temp 97  F (36.1  C) (Tympanic)   Resp 16   Wt 42.2 kg (93 lb 1.6 oz)   SpO2 100%   BMI 17.59 kg/m      General appearance:  Patient is frail 77 year old woman in no acute distress cachectic.   She is able to climb onto exam table without assistance.  HEENT:  No pallor, icterus, or mucositis.  No thyromegaly.    Lungs:  Clear to auscultation bilaterally.   Heart:  Regular rate and rhythm; no S3 S4 or murmer.     Abdomen:  Positive bowel sounds, soft and nontender, nondistended.  No hepatomegaly. No splenomegaly appreciated.    Extremities:  No joint swelling or tenderness.  No ankle edema.     Skin:  No rash, no petechiae or ecchymoses.    Labs:   Latest Reference Range & Units 01/11/23 11:04   Sodium 136 - 145 mmol/L 137   Potassium 3.4 - 5.3 mmol/L 4.1   Chloride 98 - 107 mmol/L 104   Carbon Dioxide (CO2) 22 - 29 mmol/L 25   Urea Nitrogen 8.0 - 23.0 mg/dL 26.1 (H)   Creatinine 0.51 - 0.95 mg/dL 1.07 (H)   GFR Estimate >60 mL/min/1.73m2 53 (L)   Calcium 8.8 - 10.2 mg/dL 8.8   Anion Gap 7 - 15 mmol/L 8   Albumin 3.5 - 5.2 g/dL 2.7 (L)   Protein Total 6.4 - 8.3 g/dL 6.8   Alkaline Phosphatase 35 - 104 U/L 99   ALT 10 - 35 U/L <5 (L)   AST 10 - 35 U/L 14   Bilirubin Total <=1.2 mg/dL 0.5   Ferritin 11 - 328 ng/mL 1,044 (H)   Glucose 70 - 99 mg/dL 107 (H)   Iron 37 - 145 ug/dL 113   Iron Binding Capacity  See Comment   Iron Sat Index  See Comment   Lactate Dehydrogenase 0 - 250 U/L 210   Uric Acid 2.4 - 5.7 mg/dL 6.6 (H)   WBC 4.0 - 11.0 10e3/uL 1.6 (L)   Hemoglobin 11.7 - 15.7 g/dL 7.1 (L)   Hematocrit 35.0 - 47.0 % 23.5 (L)   Platelet Count 150 - 450 10e3/uL 58 (L)   RBC Count 3.80 - 5.20 10e6/uL 2.29 (L)   MCV 78 - 100 fL 103 (H)   MCH 26.5 - 33.0 pg 31.0   MCHC 31.5 - 36.5 g/dL 30.2 (L)   RDW 10.0 - 15.0 % 23.5  "(H)   % Neutrophils % 30   % Lymphocytes % 59   % Monocytes % 11   % Eosinophils % 0   % Basophils % 0   Absolute Basophils 0.0 - 0.2 10e3/uL 0.0   NRBC/W <=0 % 1 (H)   Absolute Neutrophil 1.6 - 8.3 10e3/uL 0.5 (L)   Absolute Lymphocytes 0.8 - 5.3 10e3/uL 0.9   Absolute Monocytes 0.0 - 1.3 10e3/uL 0.2   Absolute Eosinophils 0.0 - 0.7 10e3/uL 0.0   Absolute NRBCs <=0.0 10e3/uL 0.0   (H): Data is abnormally high  (L): Data is abnormally low      Assessment and recommendation:    # MDS-EB 2- IPSS-R score very high risk-this means her median overall survival is 0.8 years and it 25% chance of developing acute leukemia within 0.7 years.  I discussed the bone marrow biopsy results with Bonny.  I explained that her bone marrow now is different than when she was diagnosed with the aplastic anemia several years ago.  This has now become a \"preleukemia.\"  There is treatment for this to try to help the anemia so that she does not need blood transfusions and help the white blood cell count and platelet count.  The treatment has about a 50% chance of improving her blood counts over it while of at least 4 cycles.  Unfortunately the treatment is not curative, and eventually this will turn into leukemia.  Her performance status is Karnofsky 50.  I think it is worth trying azacitidine, which she can receive at home, but I would have a low threshold for stopping the azacitidine.    Azacitidine is given subcutaneous once a day for 5 days every 28 days.  The main risk factor for it is that the blood counts will all drop low and he will be at risk for infection, thrombocytopenia and anemia, and transfusions may be needed. Antibiotics may be needed if there is a fever >100.5 or other signs of infection.  Mild nausea that may occur.  We give anti-nausea medications with it.  There can be some irritation at the subcutaneous infusion site.  It takes at least 2 cycles and possibly up to 5 cycles to see a response.  I gave her written " information about azacitidine.  I will go ahead and write the order for it, because it will take a while for preauthorization and home care set up, so she will have some time to think about it.  I offered to call her son to discuss this with him, and she agreed to that, but needs to get me his phone number.  -Azacitidine 75 mg per metered squared subcutaneous daily x5 q. 28 days- resume home care  - Start allopurinol 100 mg daily    #Anemia-this is due to mainly to the MDS.  She has not responded to Aranesp.  -Transfuse 1 unit of packed red cells today  - Stop Aranesp    #Failure to thrive- this may be due to the lymphocytic colitis.  In managing her care, it must be remembered that she weighs only 42 kg, so she should not be overdosed with medications.  Next visit, discussion about high-protein supplements    #Lymphocytic colitis-from my standpoint, it is okay for her to be on budesonide.  She should talk with her gastroenterologist    #CODE STATUS- I discussed this briefly with her.  Given all of her medical conditions, if she were to end up in the intensive care unit intubated, she would not survive to leave the hospital.  I recommended that she have a DO NOT RESUSCITATE order and she agrees with that.  On her next visit, we can discuss further whether she wants POLST orders.  At this point I think it is reasonable to continue supportive therapies and the azacitidine which is palliative, but if she is not tolerating these, then she could be considered for hospice.  We did not discuss hospice today.    Time: I spent a total of 90  minutes on the day of the visit. Please see the note for further information on patient assessment and treatment.       Minerva pSann MD  Hematology

## 2023-01-11 NOTE — PATIENT INSTRUCTIONS
MDS- Myelodysplastic syndrome with excess blasts-2  This is a  form of pre-leukemia (cancer of blood cells)     Azacitidine chemotherapy is given to try to help the blood counts, but will not cure the blood problem. It is given daily x 5 days every month (a cycle). If there is no improvement in blood counts after 4 cycles, we will stop. If blood counts are improved, then the azacitidine is continued.     You will receive red blood cell infusions as needed.     Someone will call you about transfusion within the next few days and chemotherapy appointments.

## 2023-01-12 NOTE — TELEPHONE ENCOUNTER
I spoke to Bonny's son, Shakeel Raymundo, today. I discussed the diagnosis, prognosis and potential treatments for the myelodysplastic syndrome-excess blasts 2 (MDS-EB2). I discussed that the main goal of treating with azacitidine is palliative, hoping to decrease the need for red blood cell transfusion, platelet transfusions, and improving white count.  Is slightly can also increase life expectancy, although it does not cure MDS.  Eventually she would develop leukemia or die of infection, bleeding, regardless of any initial improvement with azacitidine.  I described the treatment with azacitidine, emphasizing that I have treated many patients in their 70s and 80s with azacitidine and is usually quite well-tolerated.  There is NOT a problem with severe nausea and vomiting, hair falling out, or other severe symptoms that many people associate with the word chemotherapy.  One of the main issues is fatigue, and blood counts may go temporarily lower.  However her counts are already quite low so receiving azacitidine really would not make her situation much worse from a blood count standpoint.  Her performance status is not optimal, however I think it is adequate to strongly consider azacitidine as a palliative option.  With each cycle I would reassess how she is doing and we can always stop.  It does usually take at least 3 cycles to know for sure whether it is helping or not.    Shakeel was very concerned about her quality of life and whether azacitidine would make things worse rather than better.  The azacitidine likely can be given at home through home nursing, and as mentioned above basically well-tolerated.  I tried to convince him that this is worth trying and could always be discontinued if she has significant toxicity from it, which is unlikeley.  The alternative is palliative transfusions and antibiotics as needed.  If and when she develops acute leukemia, she is not a candidate for any treatment for that, because of  her overall frailty and poor performance status.    I discussed that part of the issue with the overall wellbeing is that she has lost significant weight.  Usually this is not a feature of high-grade MDS.  She has just also been diagnosed with lymphocytic colitis.  The MDS is not the cause of her developing lymphocytic colitis.  Her gastroenterologist, Dr. Dawson mentioned starting budesonide on her.  From my standpoint there is no contraindications in doing that.  It is worth trying the budesonide to see if it helps her from the weight loss, failure to thrive standpoint.    I have ordered the azacitidine to start on Monday, January 23.  This gives time to find out if insurance will pay for a for her to receive it, hopefully through home infusion.  This will also give her time to discuss with her son about pros and cons of treatment    My recommendation is to try the azacitidine, but I understand concerns about quality of life and whether she would tolerate it. I am not opposed to the idea of simply operating palliative transfusions and antibiotics as needed if that is their decision.  I can be in touch with Bonny and her son next week once we find out about insurance coverage and for final decision regarding whether to start azacitidine.    Minerva Spann MD

## 2023-01-13 NOTE — PROGRESS NOTES
Infusion Nursing Note:  Bonny Raymundo presents today for 1 unit PRBC.    Patient seen by provider today: No   present during visit today: Not Applicable.    Note: Patient reports to having fatigue and sob with her anemia.  Patient unsure of her treatment plan moving forward.  Per Dr. Spann's note, Dr. Spann will reach out to the patient and her son next week.  Patient updated.  Patient encouraged to call this clinic to schedule more labs/possible transfusion appointments if she feels like she may need a transfusion before the next scheduled lab on 2/8/23.     Intravenous Access:3  Peripheral IV    Treatment Conditions:  Lab Results   Component Value Date    HGB 7.1 (L) 01/11/2023    WBC 1.6 (L) 01/11/2023    ANEU 0.5 (L) 01/11/2023    ANEUTAUTO 1.3 (L) 09/12/2022    PLT 58 (L) 01/11/2023      Lab Results   Component Value Date     01/11/2023    POTASSIUM 4.1 01/11/2023    MAG 1.8 06/02/2022    CR 1.07 (H) 01/11/2023    LEO 8.8 01/11/2023    BILITOTAL 0.5 01/11/2023    ALBUMIN 2.7 (L) 01/11/2023    ALT <5 (L) 01/11/2023    AST 14 01/11/2023     Results reviewed, labs MET treatment parameters, ok to proceed with treatment.  Blood transfusion consent signed 1/11/23.    Post Infusion Assessment:  Patient tolerated infusion without incident.  Blood return noted pre and post infusion.  Site patent and intact, free from redness, edema or discomfort.  No evidence of extravasations.  Access discontinued per protocol.     Discharge Plan:   Patient declined prescription refills.  Discharge instructions reviewed with: Patient.  Patient and/or family verbalized understanding of discharge instructions and all questions answered.  Copy of AVS reviewed with patient and/or family.  Patient will return 2/8/23 for next appointment.  Patient discharged in stable condition accompanied by: self.  Departure Mode: Ambulatory.      Sue Conner RN

## 2023-01-16 NOTE — TELEPHONE ENCOUNTER
I left messages on Bonny's home phone and on her cell phone that I would like to talk to her about the cytidine.  I do not know if she has had a chance to discuss with her son about whether she wanted to go forward.  Also, it turns out her insurance will not pay for her to receive azacitidine at home.  She would have to get it at the clinic, she would be able to get it at [facility], which is closer to her home.  We will continue to try to reach her.  She does have an appointment with me on 12/8/2023.  It would be optimal if her son can attend that appointment or at least join us by phone.  Minerva Spann MD

## 2023-01-18 NOTE — PROGRESS NOTES
Have left messages on pt's cell phone and home line to discuss upcoming appts and if she decided to start the azacitidine. She has a follow-up appt with Dr Spann on 2/8/23 that Dr Spann would like her son to attend.  Will attempt to reach pt tomorrow.      Sue Marquez, RN  RN Care Coordinator  [phone] clinic main line
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
## 2023-01-30 NOTE — TELEPHONE ENCOUNTER
Called to remind patient of their upcoming appointment with our GI clinic, on Tuesday 2/7/2023 at 1:45PM with Dr. Kane Castellano. This appointment is scheduled as an in-person appt. Please arrive 15 minutes early to check in for your appointment. , if your appointment is virtual (video or telephone) you need to be in [place] for the visit. To reschedule or cancel patient to call [phone].    Nathaly Vazquez MA

## 2023-02-04 NOTE — ANESTHESIA POSTPROCEDURE EVALUATION
Patient: Bonny Raymundo    Procedure: Procedure(s):  Sigmoidoscopy flexible       Anesthesia Type:  MAC    Note:  Disposition: Outpatient   Postop Pain Control: Uneventful            Sign Out: Well controlled pain   PONV: No   Neuro/Psych: Uneventful            Sign Out: Acceptable/Baseline neuro status   Airway/Respiratory: Uneventful            Sign Out: Acceptable/Baseline resp. status   CV/Hemodynamics: Uneventful            Sign Out: Acceptable CV status; No obvious hypovolemia; No obvious fluid overload   Other NRE: NONE   DID A NON-ROUTINE EVENT OCCUR? No           Last vitals:  Vitals Value Taken Time   /70 01/13/23 1110   Temp 36.9  C (98.4  F) 01/13/23 1050   Pulse 67 01/13/23 1110   Resp 16 01/13/23 1050   SpO2 100 % 01/13/23 1110       Electronically Signed By: Rick White MD  February 3, 2023  8:33 PM

## 2023-02-08 NOTE — NURSING NOTE
"Oncology Rooming Note    February 8, 2023 10:30 AM   Bonny Raymundo is a 77 year old female who presents for:    Chief Complaint   Patient presents with     Blood Draw     Labs drawn via  by RN in lab. Patient declined PIV. VS taken.      Oncology Clinic Visit     RAEB-2 (refractory anemia with excess blasts-2)     Initial Vitals: /69   Pulse 100   Resp 14   SpO2 98%  Estimated body mass index is 17.59 kg/m  as calculated from the following:    Height as of 1/4/23: 1.549 m (5' 1\").    Weight as of 1/11/23: 42.2 kg (93 lb 1.6 oz). There is no height or weight on file to calculate BSA.  Mild Pain (3) Comment: Data Unavailable   No LMP recorded. Patient has had a hysterectomy.  Allergies reviewed: Yes  Medications reviewed: Yes    Medications: Medication refills not needed today.  Pharmacy name entered into [facility]:    [facility] [place], [place] - [address], [address]  [facility] - [place], [place] - [address] [place] [address]/[id number] - [place], [place] - [address] [facility]    Clinical concerns: No new clinical concerns other than reason for visit today.     Chani Hilton, EMT            "

## 2023-02-08 NOTE — PROGRESS NOTES
Hematology clinic visit  In person visit     Problem list:  - Myelodysplastic syndrome with excess blasts-2 (MDS-EB 2).  Bone marrow biopsy 12/15/2022.   IPSS-R score 8.5-very high - based on cytogenetics -7, > 10% blasts, hemoglobin less than 8, platelet , ANC less than 0.8     Final Diagnosis   A.  Bone Marrow Biopsy from left posterior iliac crest and peripheral smear morphology:  - Myelodysplastic syndrome with excess blasts 2 (MDS-EB2) showing:       - Increased marrow blasts (13.3% on imprint smear differential, 12% on flow cytometry).       - Mildly hypercellular marrow for age , 30-35% .      - Moderately increased marrow fibrosis (MF-2).      - No appreciable dysplasia.      - Increased marrow storage iron with 84% sideroblasts, 6% ringed sideroblasts      - Peripheral blood pancytopenia showing:            - Moderate macrocytic, normochromic anemia without evidence of hemolysis or increased red cell regeneration, rare circulating nucleated red blood cell.           - Moderate leukopenia with rare circulating blasts without Bernice rods.           - Moderate to marked thrombocytopenia.   Electronically signed by Rick Bangura MD on 12/19/2022    Cytogenetics-46,XX,-7,+21[20]  Detected Alterations of Known or Potential Pathogenicity: RUNX1 G199E     Detected Alterations of Uncertain Significance: None     Genes with No Detected Clinically Significant Alterations: ABL1, ALK, ASXL1, ATRX, BCOR, CALR, CBL, CEBPA, CSF3R, DNMT3A, ETV6, EZH2, FLT3, GATA1, GATA2, HRAS, IDH1, IDH2, JAK1, JAK2, JAK3, KIT, KMT2A, KRAS, MPL, NF1, NPM1, NRAS, PDGFRA, PDGFRB, PHF6, PTPN11, JAMA, SETBP1, SF1, SF3A1, SF3B1, SH2B3, SRSF2, TET2, TP53, U2AF2, WT1, ZRSR2     She was initially diagnosed as aplastic anemia-diagnosed 9/26/2016, repeat bone marrow biopsy 12/22/16- bone marrow <5% cellular with no dysplasia.  Normal cytogenetics. PNH negative. 1/9/ 2017-treated with ATG, methylprednisolone, cyclosporine, and eltrombopag.   Cyclosporine discontinued May 2021, eltrombopag discontinued June 2021 due to abnormal liver function test.    Partial remission with the immunotherapy, which is gradually weaned.  Now transfusion dependent  - Chronic kidney disease stage IV  - Atrial fibrillation-on apixaban chronically  -History of iron deficiency anemia- Venofer 300 mg IV 7/7/2021, 8/6/2021, 4/14/2022  -History of B12 deficiency  -MGUS  - COPD  - GERD  -History of lower GI bleed 2017  -Severely decreased bilateral hearing  - Benign paroxysmal positional vertigo  - History of unprovoked left leg deep vein thrombosis (DVT)  3/17/21 and bilateral pulmonary embolism (PE) 9/11/12  - Osteoporosis with compression fracture of spine.  - Shingles right face  2022     Interval history:  is here for follow-up of MDS-EB2. She is accompanied by her son, Shakeel Raymundo.  Her IPSS R score is 8.5, considered very high risk.  Her hemoglobin has not responded to Aranesp, so discontinued.  She is transfusion dependent.    She was admitted to [facility] on 1/16/2023 after she had an unwitnessed fall on ice.  She suffered subdural hematoma and subarachnoid hemorrhage and a right subcapital hip fracture.  The apixaban was reversed with prothrombin complex concentrate (PCC).  She was also given packed red cells and platelets.  She was monitored closely for any intracranial hemorrhage, and did not require neurosurgical procedure.  She underwent right hip hemiarthroplasty on 1/19/2023.  She is now in a transitional care unit.  Apixaban continues to be held.     She was also recently been diagnosed with lymphocytic colitis, possible cause of her weight loss.  Budesonide has been recommended, but she has not started that.    She reports she can get herself rest.  She can walk very short distances with a walker.  She has not had any attempt to bathe alone.  She says her appetite is good and she is eating 3 meals a day.  This is different than at  home when she ate last because she had to cook for herself.  She is not having any pain, in particular there is no pain in the right hip.  She says her left leg swells a little bit chronically, no new swelling.  No bleeding symptoms.      PHYSICAL EXAMINATION:  /69   Pulse 100   Resp 14   SpO2 98%     General appearance:  Patient is frail 77 year old woman in no acute distress.  I did not have her attempt to climb onto exam table.  I did not observe her stand up and walk a few steps with a walker.  HEENT:  No pallor, icterus  Lungs:  Clear to auscultation bilaterally.   Heart:  Regular rate and rhythm; no S3 S4 or murmer.     Abdomen:  Positive bowel sounds, soft and nontender, nondistended.    Extremities:  No joint swelling or tenderness.  No ankle edema.     Skin: Very thin skin, approximately 5 mm ecchymoses right hand.  No rash, no petechiae or other ecchymoses.    Labs:   Latest Reference Range & Units 01/11/23 11:04 02/08/23 09:12   WBC 4.0 - 11.0 10e3/uL 1.6 (L) 1.6 (L) (P)   Hemoglobin 11.7 - 15.7 g/dL 7.1 (L) 7.4 (L) (P)   Hematocrit 35.0 - 47.0 % 23.5 (L) 24.6 (L) (P)   Platelet Count 150 - 450 10e3/uL 58 (L) 42 (LL) (P)   RBC Count 3.80 - 5.20 10e6/uL 2.29 (L) 2.54 (L) (P)   MCV 78 - 100 fL 103 (H) 97 (P)   MCH 26.5 - 33.0 pg 31.0 29.1 (P)   MCHC 31.5 - 36.5 g/dL 30.2 (L) 30.1 (L) (P)   RDW 10.0 - 15.0 % 23.5 (H) 19.1 (H) (P)   % Neutrophils % 30 26   % Lymphocytes % 59 51   % Monocytes % 11 18   % Eosinophils % 0 0   % Basophils % 0 1   % Other Cells %  4   Absolute Basophils 0.0 - 0.2 10e3/uL 0.0 0.0   NRBC/W <=0 % 1 (H)    Absolute Neutrophil 1.6 - 8.3 10e3/uL 0.5 (L) 0.4 (LL)   Absolute Lymphocytes 0.8 - 5.3 10e3/uL 0.9 0.8   Absolute Monocytes 0.0 - 1.3 10e3/uL 0.2 0.3   Absolute Eosinophils 0.0 - 0.7 10e3/uL 0.0 0.0   Absolute Other Cells <=0.0 10e3/uL  0.1 (H)   Absolute NRBCs 10e3/uL  0.0 (P)   Absolute NRBCs <=0.0 10e3/uL 0.0    NRBCs per 100 WBC <1 /100  1 (H) (P)          Imaging:    CT without contrast, 1/20/2023     Indication:  Reassess SDH  .     Comparison: 1/17/2023     Technique: Nonenhanced axial CT from vertex to foramen magnum. Images reviewed in brain, blood, and bone window settings.   Dose:  (Total DLP) = 1180 mGy*cm.     Findings: Again demonstrated is mild to moderate subdural hemorrhage along the left tentorium, left occipital lobe, and mixed density moderate volume of subdural fluid collection along the left frontal and temporal lobes. The subdural fluid along the left frontal and temporal lobes is decreased in density since prior exam. Reducing subarachnoid hemorrhage in the perimesencephalic cisterns. Grossly stable subdural hemorrhage along the clivus. No new or worsening intracranial hemorrhage is suspected. 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 Small cerebellar infarctions bilaterally, chronic. Mild patchy nonspecific hypodensities in the periventricular cerebral white matter bilaterally. Atherosclerotic calcifications of the intracranial carotid and vertebral arteries. Chronic deformity (presumably posttraumatic) of the atlantodental articulation.      Assessment and recommendation:     # MDS-EB 2- IPSS-R score very high risk-this means her median overall survival is 0.8 years and it 25% chance of developing acute leukemia within 0.7 years.  I discussed with her and her son that this is a serious bone marrow problem.  At this time, her performance status is not good enough to consider treatment with either cytidine.  I am concerned that if she were to start azacitidine that it would do more harm than good given her frailty.  I did indicate that she probably would die because of this MDS, but did not give an explicit timeframe.  Shakeel seem to have a fairly good understanding of this, but I am not sure that Bonny did.  She asked about starting treatment at a later date, but I am not too  hopeful that her condition would improve enough that there would be any benefit to restarting the decitabine later date.  I discussed that the main treatment would be supportive with red blood cell transfusions.  I considered the issue of prophylactic antibiotics, but in reviewing the literature currently now there has been no proven benefit to prophylactic antimicrobials for patients with MDS.  Because she is receiving prophylactic transfusions, she unfortunately cannot be transition to hospice.  - CBC with differential every 3 to 4 weeks with RBC transfusions as needed.       #Anemia-this is due to mainly to the MDS.    I am pleasantly surprised that her hemoglobin is not less than 7 today.  However I think she would still benefit in her rehabilitation if she has a higher hemoglobin.  She has not responded to Aranesp.  I explained to her that because she is such a small person, that 1 unit of packed red cells at a time should do well to raise her hemoglobin adequately.  I will set her up to have labs and potential transfusion about every 3 weeks.  -Transfuse 1 unit of packed red cells today     #Failure to thrive- this may be due to the lymphocytic colitis.    However, it may be in part due to complete lack of meals for her in her home setting.     #Lymphocytic colitis-from my standpoint, it is okay for her to be on budesonide.  She should talk with her gastroenterologist    #Recent subdural hematoma and subarachnoid hemorrhage after a fall- she had a DVT a couple of years ago.  I discussed with her at this point that the risk of bleeding while on anticoagulation, in particular in view of her low platelet count, far outweighs the risk of clotting.    -She should NOT restart the apixaban.    #.  Hip fracture, frailty- she has had a transitional care unit.  She would like to be able to go home.  She and her son are working out what her needs will be at home.     #CODE STATUS- I confirmed with her that she should  have she have a DO NOT RESUSCITATE order.  I then discussed with her and her son about POLST orders.  I explained that these are different from advance directives and that they are actual orders.  In going through the forearm, she agreed that she would want treatment, but not resuscitative measures in the ICU.  She also would not want any sort of feeding tube.  She would still want IV antibiotics if indicated.  I think this is reasonable at this time.  She is not ready to go completely on comfort cares only.  Explained that the advantage of POLST orders are that she would keep them at home, perhaps attached to her refrigerator, so if she ever had a crisis and called 911, the EMTs would know what to do.  I explained that unfortunately EMTs do not have access to her electronic medical record.  She and her son will think about whether to sign this and give a copy back to me.      [facility] labs and possible RBC transfusion March 2  MD and labs March 9 , add on at 11 in person-she prefers to get infusions at [facility], but still wants to see me in person.    Time: I spent a total of 60 minutes on the day of the visit. Please see the note for further information on patient assessment and treatment.         Minerva Spann MD  Hematology

## 2023-02-08 NOTE — PATIENT INSTRUCTIONS
Contact Numbers  [facility]: [phone] (for symptom and scheduling needs)    Please call the [facility] Triage line if you experience a temperature greater than or equal to 100.4, shaking chills, have uncontrolled nausea, vomiting and/or diarrhea, dizziness, shortness of breath, chest pain, bleeding, unexplained bruising, or if you have any other new/concerning symptoms, questions or concerns.     If you are having any concerning symptoms or wish to speak to a provider before your next infusion visit, please call your care coordinator or triage to notify them so we can adequately serve you.     If you need a refill on a narcotic prescription or other medication, please call triage before your infusion appointment.

## 2023-02-08 NOTE — PROGRESS NOTES
Infusion Nursing Note:  Bonny Raymundo presents today for 1 unit PRBC.    Patient seen by provider today: Yes: Dr. Spann saw patient prior to infusion   present during visit today: Not Applicable.    Note: Patient states she has no questions or concerns after her visit with Dr. Spann.    Denies need for intervention for pain, while in infusion.    Intravenous Access:  Peripheral IV placed by vascular access.    Treatment Conditions:   Latest Reference Range & Units 02/08/23 09:12   Sodium 136 - 145 mmol/L 137   Potassium 3.4 - 5.3 mmol/L 3.9   Chloride 98 - 107 mmol/L 101   Carbon Dioxide (CO2) 22 - 29 mmol/L 27   Urea Nitrogen 8.0 - 23.0 mg/dL 27.1 (H)   Creatinine 0.51 - 0.95 mg/dL 1.00 (H)   GFR Estimate >60 mL/min/1.73m2 57 (L)   Calcium 8.8 - 10.2 mg/dL 9.6   Anion Gap 7 - 15 mmol/L 9   Glucose 70 - 99 mg/dL 109 (H)   Uric Acid 2.4 - 5.7 mg/dL 6.6 (H)   WBC 4.0 - 11.0 10e3/uL 1.6 (L) (P)   Hemoglobin 11.7 - 15.7 g/dL 7.4 (L) (P)   Hematocrit 35.0 - 47.0 % 24.6 (L) (P)   Platelet Count 150 - 450 10e3/uL 42 (LL) (P)   RBC Count 3.80 - 5.20 10e6/uL 2.54 (L) (P)   MCV 78 - 100 fL 97 (P)   MCH 26.5 - 33.0 pg 29.1 (P)   MCHC 31.5 - 36.5 g/dL 30.1 (L) (P)   RDW 10.0 - 15.0 % 19.1 (H) (P)   % Neutrophils % 26 (P)   % Lymphocytes % 51 (P)   % Monocytes % 18 (P)   % Eosinophils % 0 (P)   % Basophils % 1 (P)   % Other Cells % 4 (P)   Absolute Basophils 0.0 - 0.2 10e3/uL 0.0 (P)   Absolute Neutrophil 1.6 - 8.3 10e3/uL 0.4 (LL) (P)   Absolute Lymphocytes 0.8 - 5.3 10e3/uL 0.8 (P)   Absolute Monocytes 0.0 - 1.3 10e3/uL 0.3 (P)   Absolute Eosinophils 0.0 - 0.7 10e3/uL 0.0 (P)   Absolute Other Cells <=0.0 10e3/uL 0.1 (H) (P)   Absolute NRBCs 10e3/uL 0.0 (P)   NRBCs per 100 WBC <1 /100 1 (H) (P)   RBC Morphology  Confirmed RBC Indices (P)   Platelet Morphology Automated Count Confirmed. Platelet morphology is normal.  Automated Count Confirmed. Giant platelets are present. ! (P)   ABO/Rh(D)  A POS   Antibody Screen  Negative  Negative   SPECIMEN EXPIRATION DATE  [id number]   Pathologist Review Comments  See Comment (P)     Results reviewed, labs MET treatment parameters, ok to proceed with treatment. Hgb parameter: hgb <8.5.  Only PRBC are ordered in blood plan.  Blood transfusion consent signed 1/11/23.    Post Infusion Assessment:  Patient tolerated infusion without incident.  Blood return noted pre and post infusion.  Site patent and intact, free from redness, edema or discomfort.  No evidence of extravasations.  Access discontinued per protocol.     Discharge Plan:   Patient declined prescription refills.  Discharge instructions reviewed with: Patient.  Patient and/or family verbalized understanding of discharge instructions and all questions answered.  AVS to patient via [employer].  Next appointments to be scheduled per Dr. Spann's check out note.   Patient discharged in stable condition accompanied by: self.  Departure Mode: Wheelchair.      Hawa Quesada RN

## 2023-02-08 NOTE — NURSING NOTE
Chief Complaint   Patient presents with     Blood Draw     Labs drawn via  by RN in lab. Patient declined PIV. VS taken.      Labs drawn via venipuncture. Pt. Declined PIV.  Vital signs taken. Checked into next appointment.   Lana Griggs RN

## 2023-03-01 NOTE — TELEPHONE ENCOUNTER
DATE:  3/1/2023   TIME OF RECEIPT FROM LAB:  1319  LAB TEST/VALUE:  hgb 6.8, plt 35 from 1040 today  (values from 2/8/23 hgb 7.4, plt 42)  TIME LAB VALUE REPORTED TO PROVIDER:    Reported results to DELGADO Rogers at 1324.   Routed encounter to Dr. Spann and Sue.   No further action required from Triage at this time.

## 2023-03-02 NOTE — PROGRESS NOTES
Infusion Nursing Note:  Bonny Raymundo presents today for 1 unit PRBCs.    Patient seen by provider today: No.   present during visit today: Not Applicable.    Note: N/A.    Intravenous Access:  Peripheral IV placed.    Treatment Conditions:  Lab Results   Component Value Date    HGB 6.8 (LL) 03/01/2023    WBC 2.3 (L) 03/01/2023    ANEU 0.8 (L) 03/01/2023    ANEUTAUTO 1.3 (L) 09/12/2022    PLT 35 (LL) 03/01/2023      Blood transfusion consent signed 1/11/23.    Post Infusion Assessment:  Patient tolerated infusion without incident.  Blood return noted pre and post infusion.  Site patent and intact, free from redness, edema or discomfort.  No evidence of extravasations.  Access discontinued per protocol.     Discharge Plan:   Patient declined prescription refills.  Discharge instructions reviewed with: Patient.  Patient verbalized understanding of discharge instructions and all questions answered.  AVS to patient via [employer].  Patient will return 3/9/23 for next appointment.   Patient discharged in stable condition accompanied by: self.  Departure Mode: Ambulatory.      Jennie Grant RN

## 2023-03-09 NOTE — NURSING NOTE
Chief Complaint   Patient presents with     Blood Draw     Vitals taken, venipuncture labs drawn, checked into next appt.       /67 (BP Location: Left arm, Patient Position: Sitting, Cuff Size: Adult Small)   Pulse 106   Temp 97.5  F (36.4  C) (Oral)   Resp 16   Wt 44.7 kg (98 lb 8 oz)   SpO2 100%   BMI 18.61 kg/m    Olayinka Castellanos RN on 3/9/2023 at 10:45 AM

## 2023-03-09 NOTE — PROGRESS NOTES
Hematology clinic visit  In person visit     Problem list:  - Myelodysplastic syndrome with excess blasts-2 (MDS-EB 2).  Bone marrow biopsy 12/15/2022.   IPSS-R score 8.5-very high - based on cytogenetics -7, > 10% blasts, hemoglobin less than 8, platelet , ANC less than 0.8     Final Diagnosis   A.  Bone Marrow Biopsy from left posterior iliac crest and peripheral smear morphology:  - Myelodysplastic syndrome with excess blasts 2 (MDS-EB2) showing:       - Increased marrow blasts (13.3% on imprint smear differential, 12% on flow cytometry).       - Mildly hypercellular marrow for age , 30-35% .      - Moderately increased marrow fibrosis (MF-2).      - No appreciable dysplasia.      - Increased marrow storage iron with 84% sideroblasts, 6% ringed sideroblasts      - Peripheral blood pancytopenia showing:            - Moderate macrocytic, normochromic anemia without evidence of hemolysis or increased red cell regeneration, rare circulating nucleated red blood cell.           - Moderate leukopenia with rare circulating blasts without Bernice rods.           - Moderate to marked thrombocytopenia.   Electronically signed by Rick Bangura MD on 12/19/2022    Cytogenetics-46,XX,-7,+21[20]  Detected Alterations of Known or Potential Pathogenicity: RUNX1 G199E     Detected Alterations of Uncertain Significance: None     Genes with No Detected Clinically Significant Alterations: ABL1, ALK, ASXL1, ATRX, BCOR, CALR, CBL, CEBPA, CSF3R, DNMT3A, ETV6, EZH2, FLT3, GATA1, GATA2, HRAS, IDH1, IDH2, JAK1, JAK2, JAK3, KIT, KMT2A, KRAS, MPL, NF1, NPM1, NRAS, PDGFRA, PDGFRB, PHF6, PTPN11, JAMA, SETBP1, SF1, SF3A1, SF3B1, SH2B3, SRSF2, TET2, TP53, U2AF2, WT1, ZRSR2     She was initially diagnosed as aplastic anemia-diagnosed 9/26/2016, repeat bone marrow biopsy 12/22/16- bone marrow <5% cellular with no dysplasia.  Normal cytogenetics. PNH negative. 1/9/ 2017-treated with ATG, methylprednisolone, cyclosporine, and eltrombopag.   Cyclosporine discontinued May 2021, eltrombopag discontinued June 2021 due to abnormal liver function test.    Partial remission with the immunotherapy, which is gradually weaned.  Now transfusion dependent  - Chronic kidney disease stage IV  - Atrial fibrillation-on apixaban chronically  -History of iron deficiency anemia- Venofer 300 mg IV 7/7/2021, 8/6/2021, 4/14/2022  -History of B12 deficiency  -MGUS  - COPD  - GERD  -History of lower GI bleed 2017  -Severely decreased bilateral hearing  - Benign paroxysmal positional vertigo  - History of unprovoked left leg deep vein thrombosis (DVT)  3/17/21 and bilateral pulmonary embolism (PE) 9/11/12  - Osteoporosis with compression fracture of spine.  - Shingles right face  2022  - Fall with right hip fracture 1/16/2023  - Subdural hematoma related to fall from standing 1/16/2023-no surgical intervention needed     Interval history:  is here for follow-up of MDS-EB2. She is accompanied by her son, Shakeel Raymundo.  Her IPSS R score is 8.5, considered very high risk.  Her hemoglobin has not responded to Aranesp, so discontinued.  She is transfusion dependent.  She received 1 unit of packed red cells on 3/2/2023 for hemoglobin of 6.8 g/dl.    She reports that her energy is a bit low.  She usually takes 2 naps a day.  She does some cooking for herself, but then feels very tired afterwards and takes a nap.  She is able to take a shower by herself, but again that wears her out and she needs to take a nap afterwards.  She is not having any epistaxis, melena, hematochezia, hematuria.  She does have a few unexplained bruises on her arms.  No complaints of bruising elsewhere.    She complains of some little bumps on her upper chest that are very itchy, this has been going on for many weeks.  She put hydrocortisone cream on it.    She is using a cane to walk.  She has not been driving, but says she hopes to when there is no worse no.  She has not had any falls since she  had her fall with hip replacement in January 2023.      PHYSICAL EXAMINATION:  /67 (BP Location: Left arm, Patient Position: Sitting, Cuff Size: Adult Small)   Pulse 106   Temp 97.5  F (36.4  C) (Oral)   Resp 16   Wt 44.7 kg (98 lb 8 oz)   SpO2 100%   BMI 18.61 kg/m      General appearance:  Patient is 77 year old woman in no acute distress.   Cachectic.  HEENT:  No pallor, icterus,.    Lungs:  Clear to auscultation bilaterally.   Heart:  Regular rate and rhythm; no S3 S4 or murmer.     Abdomen:  Positive bowel sounds, soft and nontender, nondistended.  No hepatomegaly. No splenomegaly appreciated.    Extremities:  No joint swelling or tenderness.  No ankle edema.     Skin: Scattered 1-2 mm subcutaneous nodules upper chest.  A few tiny bruises upper chest.  Senile purpura left upper arm left forearm right hand.  No rash, no petechiae.    Labs:   Latest Reference Range & Units 03/09/23 10:42   Sodium 136 - 145 mmol/L 141   Potassium 3.4 - 5.3 mmol/L 3.8   Chloride 98 - 107 mmol/L 106   Carbon Dioxide (CO2) 22 - 29 mmol/L 25   Urea Nitrogen 8.0 - 23.0 mg/dL 27.8 (H)   Creatinine 0.51 - 0.95 mg/dL 0.98 (H)   GFR Estimate >60 mL/min/1.73m2 58 (L)   Calcium 8.8 - 10.2 mg/dL 9.3   Anion Gap 7 - 15 mmol/L 10   (H): Data is abnormally high  (L): Data is abnormally low   Latest Reference Range & Units 02/08/23 09:12 03/01/23 10:45 03/09/23 10:42   WBC 4.0 - 11.0 10e3/uL 1.6 (L) 2.3 (L) 1.4 (L)   Hemoglobin 11.7 - 15.7 g/dL 7.4 (L) 6.8 (LL) 7.2 (L)   Hematocrit 35.0 - 47.0 % 24.6 (L) 22.5 (L) 23.6 (L)   Platelet Count 150 - 450 10e3/uL 42 (LL) 35 (LL) 26 (LL)   RBC Count 3.80 - 5.20 10e6/uL 2.54 (L) 2.33 (L) 2.50 (L)   MCV 78 - 100 fL 97 97 94   MCH 26.5 - 33.0 pg 29.1 29.2 28.8   MCHC 31.5 - 36.5 g/dL 30.1 (L) 30.2 (L) 30.5 (L)   RDW 10.0 - 15.0 % 19.1 (H) 18.1 (H) 16.9 (H)   % Neutrophils % 26 33 30   % Lymphocytes % 51 56 45   % Monocytes % 18 11 22   % Eosinophils % 0 0 2   % Basophils % 1 0 1   % Other Cells  % 4     Absolute Basophils 0.0 - 0.2 10e3/uL  0.0    Absolute Basophils 0.0 - 0.2 10e3/uL 0.0  0.0   Absolute Neutrophil 1.6 - 8.3 10e3/uL 0.4 (LL) 0.8 (L) 0.4 (LL)   Absolute Lymphocytes 0.8 - 5.3 10e3/uL 0.8 1.3 0.6 (L)   Absolute Monocytes 0.0 - 1.3 10e3/uL 0.3 0.3 0.3   Absolute Eosinophils 0.0 - 0.7 10e3/uL 0.0 0.0 0.0   (LL): Data is critically low  (L): Data is abnormally low  (H): Data is abnormally high  Assessment and recommendation:     # MDS-EB 2- IPSS-R score very high risk-this means her median overall survival is 0.8 years and it 25% chance of developing acute leukemia within 0.7 years.  I discussed with her and her son that this is a serious bone marrow problem.  At this time, her performance status is not good enough to consider treatment with either cytidine.  I am concerned that if she were to start azacitidine that it would do more harm than good given her frailty.  Patient lacks insight into her condition and spite of numerous discussions. Because she is receiving prophylactic transfusions, she unfortunately cannot be transition to hospice.  I would like to avoid platelet transfusions because they do not last long, but platelets are clearly dropping.  So far no significant bleeding symptoms.  - CBC with differential every 2-3 weeks with RBC transfusions as needed.        #Anemia-this is due to mainly to the MDS.  She has not responded to Aranesp.   Unfortunately even though she is quite small, she did not have much of a bump in her hemoglobin with the 1 unit of packed red cells.  I will loosen parameters so that if she is less than 7 she will receive 2 units of packed red cells.  She will need to be checked every 2 weeks.  -Transfuse 1 unit PRBCs for hemoglobin 7.0-8.5, 2 units for hemoglobin less than 7.  Arrange for sometime within the next 5 days.  - CBC with possible RBC transfusion every 2 weeks     #Failure to thrive- this may be due to the lymphocytic colitis.    However, it may be in  part due to decreased meals for her in her home setting.       #Recent subdural hematoma and subarachnoid hemorrhage after a fall- she had a DVT a couple of years ago.  I discussed with her at this point that the risk of bleeding while on anticoagulation, in particular in view of her low platelet count, far outweighs the risk of clotting.    -She should NOT restart the apixaban.  -Continue follow-up with neurosurgery     #.  Hip fracture, frailty- she is able to walk short distances with a walker    #Itching-I am not sure what this is but the tiny bumps.  She is not a good candidate for hydroxyzine due to her history of falls.  She also does not want an oral medicine for this.  She will continue with the hydrocortisone cream.    #Driving- I filled out a form for a long-term disability parking sticker.  I advised her that she should not drive, even when there is no snow.  She does not have the strength, reaction time, vision or hearing to be able to drive safely even under the best conditions.    #CODE STATUS- I confirmed with her that she should have  a DO NOT RESUSCITATE order.  I discussed POLST orders with her and her son last time, and signed them.  She and her son wishes to think about it.  She did not return a copy of the POLST forms.        [facility] labs and possible RBC transfusion within 5 days then every 2 weeks    RTC 2 months     Time: I spent a total of 45   minutes on the day of the visit. Please see the note for further information on patient assessment and treatment.         Minerva Spann MD  Hematology

## 2023-03-09 NOTE — NURSING NOTE
"Oncology Rooming Note    March 9, 2023 10:50 AM   Bonny Raymundo is a 77 year old female who presents for:    Chief Complaint   Patient presents with     Blood Draw     Vitals taken, venipuncture labs drawn, checked into next appt.       Oncology Clinic Visit     RAEB-2 (refractory anemia with excess blasts-2)      Initial Vitals: /67 (BP Location: Left arm, Patient Position: Sitting, Cuff Size: Adult Small)   Pulse 106   Temp 97.5  F (36.4  C) (Oral)   Resp 16   Wt 44.7 kg (98 lb 8 oz)   SpO2 100%   BMI 18.61 kg/m   Estimated body mass index is 18.61 kg/m  as calculated from the following:    Height as of 1/4/23: 1.549 m (5' 1\").    Weight as of this encounter: 44.7 kg (98 lb 8 oz). Body surface area is 1.39 meters squared.  No Pain (0) Comment: Data Unavailable   No LMP recorded. Patient has had a hysterectomy.  Allergies reviewed: Yes  Medications reviewed: No    Medications: Medication refills not needed today.  Pharmacy name entered into [facility]:    [facility] [place] - [address], [address]  [facility] [place] - [address] [id number] - [place] - [address] [facility]    Clinical concerns: Pt declined to review medication list.       Kayla Burger CMA              "

## 2023-03-09 NOTE — LETTER
3/9/2023         RE: Bonny Raymundo  [address]  [facility] [address]        Dear Colleague,    Thank you for referring your patient, Bonny Raymundo, to the [facility]. Please see a copy of my visit note below.    Hematology clinic visit  In person visit     Problem list:  - Myelodysplastic syndrome with excess blasts-2 (MDS-EB 2).  Bone marrow biopsy 12/15/2022.   IPSS-R score 8.5-very high - based on cytogenetics -7, > 10% blasts, hemoglobin less than 8, platelet , ANC less than 0.8     Final Diagnosis   A.  Bone Marrow Biopsy from left posterior iliac crest and peripheral smear morphology:  - Myelodysplastic syndrome with excess blasts 2 (MDS-EB2) showing:       - Increased marrow blasts (13.3% on imprint smear differential, 12% on flow cytometry).       - Mildly hypercellular marrow for age , 30-35% .      - Moderately increased marrow fibrosis (MF-2).      - No appreciable dysplasia.      - Increased marrow storage iron with 84% sideroblasts, 6% ringed sideroblasts      - Peripheral blood pancytopenia showing:            - Moderate macrocytic, normochromic anemia without evidence of hemolysis or increased red cell regeneration, rare circulating nucleated red blood cell.           - Moderate leukopenia with rare circulating blasts without Bernice rods.           - Moderate to marked thrombocytopenia.   Electronically signed by Rikc Bangura MD on 12/19/2022    Cytogenetics-46,XX,-7,+21[20]  Detected Alterations of Known or Potential Pathogenicity: RUNX1 G199E     Detected Alterations of Uncertain Significance: None     Genes with No Detected Clinically Significant Alterations: ABL1, ALK, ASXL1, ATRX, BCOR, CALR, CBL, CEBPA, CSF3R, DNMT3A, ETV6, EZH2, FLT3, GATA1, GATA2, HRAS, IDH1, IDH2, JAK1, JAK2, JAK3, KIT, KMT2A, KRAS, MPL, NF1, NPM1, NRAS, PDGFRA, PDGFRB, PHF6, PTPN11, JAMA, SETBP1, SF1, SF3A1, SF3B1, SH2B3, SRSF2, TET2, TP53, U2AF2, WT1,  ZRSR2     She was initially diagnosed as aplastic anemia-diagnosed 9/26/2016, repeat bone marrow biopsy 12/22/16- bone marrow <5% cellular with no dysplasia.  Normal cytogenetics. PNH negative. 1/9/ 2017-treated with ATG, methylprednisolone, cyclosporine, and eltrombopag.  Cyclosporine discontinued May 2021, eltrombopag discontinued June 2021 due to abnormal liver function test.    Partial remission with the immunotherapy, which is gradually weaned.  Now transfusion dependent  - Chronic kidney disease stage IV  - Atrial fibrillation-on apixaban chronically  -History of iron deficiency anemia- Venofer 300 mg IV 7/7/2021, 8/6/2021, 4/14/2022  -History of B12 deficiency  -MGUS  - COPD  - GERD  -History of lower GI bleed 2017  -Severely decreased bilateral hearing  - Benign paroxysmal positional vertigo  - History of unprovoked left leg deep vein thrombosis (DVT)  3/17/21 and bilateral pulmonary embolism (PE) 9/11/12  - Osteoporosis with compression fracture of spine.  - Shingles right face  2022  - Fall with right hip fracture 1/16/2023  - Subdural hematoma related to fall from standing 1/16/2023-no surgical intervention needed     Interval history:  is here for follow-up of MDS-EB2. She is accompanied by her son, Shakeel Raymundo.  Her IPSS R score is 8.5, considered very high risk.  Her hemoglobin has not responded to Aranesp, so discontinued.  She is transfusion dependent.  She received 1 unit of packed red cells on 3/2/2023 for hemoglobin of 6.8 g/dl.    She reports that her energy is a bit low.  She usually takes 2 naps a day.  She does some cooking for herself, but then feels very tired afterwards and takes a nap.  She is able to take a shower by herself, but again that wears her out and she needs to take a nap afterwards.  She is not having any epistaxis, melena, hematochezia, hematuria.  She does have a few unexplained bruises on her arms.  No complaints of bruising elsewhere.    She complains of some  little bumps on her upper chest that are very itchy, this has been going on for many weeks.  She put hydrocortisone cream on it.    She is using a cane to walk.  She has not been driving, but says she hopes to when there is no worse no.  She has not had any falls since she had her fall with hip replacement in January 2023.      PHYSICAL EXAMINATION:  /67 (BP Location: Left arm, Patient Position: Sitting, Cuff Size: Adult Small)   Pulse 106   Temp 97.5  F (36.4  C) (Oral)   Resp 16   Wt 44.7 kg (98 lb 8 oz)   SpO2 100%   BMI 18.61 kg/m      General appearance:  Patient is 77 year old woman in no acute distress.   Cachectic.  HEENT:  No pallor, icterus,.    Lungs:  Clear to auscultation bilaterally.   Heart:  Regular rate and rhythm; no S3 S4 or murmer.     Abdomen:  Positive bowel sounds, soft and nontender, nondistended.  No hepatomegaly. No splenomegaly appreciated.    Extremities:  No joint swelling or tenderness.  No ankle edema.     Skin: Scattered 1-2 mm subcutaneous nodules upper chest.  A few tiny bruises upper chest.  Senile purpura left upper arm left forearm right hand.  No rash, no petechiae.    Labs:   Latest Reference Range & Units 03/09/23 10:42   Sodium 136 - 145 mmol/L 141   Potassium 3.4 - 5.3 mmol/L 3.8   Chloride 98 - 107 mmol/L 106   Carbon Dioxide (CO2) 22 - 29 mmol/L 25   Urea Nitrogen 8.0 - 23.0 mg/dL 27.8 (H)   Creatinine 0.51 - 0.95 mg/dL 0.98 (H)   GFR Estimate >60 mL/min/1.73m2 58 (L)   Calcium 8.8 - 10.2 mg/dL 9.3   Anion Gap 7 - 15 mmol/L 10   (H): Data is abnormally high  (L): Data is abnormally low   Latest Reference Range & Units 02/08/23 09:12 03/01/23 10:45 03/09/23 10:42   WBC 4.0 - 11.0 10e3/uL 1.6 (L) 2.3 (L) 1.4 (L)   Hemoglobin 11.7 - 15.7 g/dL 7.4 (L) 6.8 (LL) 7.2 (L)   Hematocrit 35.0 - 47.0 % 24.6 (L) 22.5 (L) 23.6 (L)   Platelet Count 150 - 450 10e3/uL 42 (LL) 35 (LL) 26 (LL)   RBC Count 3.80 - 5.20 10e6/uL 2.54 (L) 2.33 (L) 2.50 (L)   MCV 78 - 100 fL 97 97 94    MCH 26.5 - 33.0 pg 29.1 29.2 28.8   MCHC 31.5 - 36.5 g/dL 30.1 (L) 30.2 (L) 30.5 (L)   RDW 10.0 - 15.0 % 19.1 (H) 18.1 (H) 16.9 (H)   % Neutrophils % 26 33 30   % Lymphocytes % 51 56 45   % Monocytes % 18 11 22   % Eosinophils % 0 0 2   % Basophils % 1 0 1   % Other Cells % 4     Absolute Basophils 0.0 - 0.2 10e3/uL  0.0    Absolute Basophils 0.0 - 0.2 10e3/uL 0.0  0.0   Absolute Neutrophil 1.6 - 8.3 10e3/uL 0.4 (LL) 0.8 (L) 0.4 (LL)   Absolute Lymphocytes 0.8 - 5.3 10e3/uL 0.8 1.3 0.6 (L)   Absolute Monocytes 0.0 - 1.3 10e3/uL 0.3 0.3 0.3   Absolute Eosinophils 0.0 - 0.7 10e3/uL 0.0 0.0 0.0   (LL): Data is critically low  (L): Data is abnormally low  (H): Data is abnormally high  Assessment and recommendation:     # MDS-EB 2- IPSS-R score very high risk-this means her median overall survival is 0.8 years and it 25% chance of developing acute leukemia within 0.7 years.  I discussed with her and her son that this is a serious bone marrow problem.  At this time, her performance status is not good enough to consider treatment with either cytidine.  I am concerned that if she were to start azacitidine that it would do more harm than good given her frailty.  Patient lacks insight into her condition and spite of numerous discussions. Because she is receiving prophylactic transfusions, she unfortunately cannot be transition to hospice.  I would like to avoid platelet transfusions because they do not last long, but platelets are clearly dropping.  So far no significant bleeding symptoms.  - CBC with differential every 2-3 weeks with RBC transfusions as needed.        #Anemia-this is due to mainly to the MDS.  She has not responded to Aranesp.   Unfortunately even though she is quite small, she did not have much of a bump in her hemoglobin with the 1 unit of packed red cells.  I will loosen parameters so that if she is less than 7 she will receive 2 units of packed red cells.  She will need to be checked every 2  weeks.  -Transfuse 1 unit PRBCs for hemoglobin 7.0-8.5, 2 units for hemoglobin less than 7.  Arrange for sometime within the next 5 days.  - CBC with possible RBC transfusion every 2 weeks     #Failure to thrive- this may be due to the lymphocytic colitis.    However, it may be in part due to decreased meals for her in her home setting.       #Recent subdural hematoma and subarachnoid hemorrhage after a fall- she had a DVT a couple of years ago.  I discussed with her at this point that the risk of bleeding while on anticoagulation, in particular in view of her low platelet count, far outweighs the risk of clotting.    -She should NOT restart the apixaban.  -Continue follow-up with neurosurgery     #.  Hip fracture, frailty- she is able to walk short distances with a walker    #Itching-I am not sure what this is but the tiny bumps.  She is not a good candidate for hydroxyzine due to her history of falls.  She also does not want an oral medicine for this.  She will continue with the hydrocortisone cream.    #Driving- I filled out a form for a long-term disability parking sticker.  I advised her that she should not drive, even when there is no snow.  She does not have the strength, reaction time, vision or hearing to be able to drive safely even under the best conditions.    #CODE STATUS- I confirmed with her that she should have  a DO NOT RESUSCITATE order.  I discussed POLST orders with her and her son last time, and signed them.  She and her son wishes to think about it.  She did not return a copy of the POLST forms.        [facility] labs and possible RBC transfusion within 5 days then every 2 weeks    RTC 2 months     Time: I spent a total of 45   minutes on the day of the visit. Please see the note for further information on patient assessment and treatment.         Minerva Spann MD  Hematology

## 2023-03-11 NOTE — PROGRESS NOTES
Infusion Nursing Note:  Bonny Raymundo presents today for scheduled transfusion.    Patient seen by provider today: No   present during visit today: Not Applicable.    Note: No labs were ordered for today.  Patient assessment was completed, see flowsheet for details.    Intravenous Access:  Peripheral IV placed.    Treatment Conditions:  Patient received one unit of red blood cells for a Hgb of 7.2 on March 9, 2023.    Post Infusion Assessment:  Patient tolerated transfusion without incident.     Discharge Plan:   Patient discharged in a wheelchair in stable condition accompanied by: self.  Family is picking her up at the Clinic front door.      DEEP RAMIRES, RN

## 2023-03-11 NOTE — NURSING NOTE
"Oncology Rooming Note    March 11, 2023 11:01 AM   Bonny Raymundo is a 77 year old female who presents for:    Chief Complaint   Patient presents with     Infusion     Scheduled transfusion for MDS     Initial Vitals: /74   Pulse 82   Temp 98.5  F (36.9  C) (Oral)   Resp 16   SpO2 100%  Estimated body mass index is 18.61 kg/m  as calculated from the following:    Height as of 1/4/23: 1.549 m (5' 1\").    Weight as of 3/9/23: 44.7 kg (98 lb 8 oz). There is no height or weight on file to calculate BSA.  No Pain (0) Comment: Data Unavailable   No LMP recorded. Patient has had a hysterectomy.  Allergies reviewed: Yes  Medications reviewed: Yes    Medications: Medication refills not needed today.  Pharmacy name entered into [facility]:    [facility] [place] - [address], [address]  [facility] [place] - [address] [id number] - [place] - [address] [facility]    Clinical concerns: Patient denies fevers/N/V/D.  She has dyspnea with exertion.  Patient denies any pain/discomfort.      DEEP RAMIRES RN                "

## 2023-03-25 NOTE — PROGRESS NOTES
Infusion Nursing Note:  Bonny Raymundo presents today for 2u PRBC.    Patient seen by provider today: No   present during visit today: Not Applicable.    Note: N/A.    Intravenous Access:  Peripheral IV placed.    Treatment Conditions:  Lab Results   Component Value Date    HGB 6.2 (LL) 03/24/2023    WBC 1.4 (L) 03/24/2023    ANEU 0.3 (LL) 03/24/2023    ANEUTAUTO 1.3 (L) 09/12/2022    PLT 22 (LL) 03/24/2023      Blood transfusion consent signed 1/11/23.    Post Infusion Assessment:  Patient tolerated infusion without incident.  Blood return noted pre and post infusion.  Site patent and intact, free from redness, edema or discomfort.  No evidence of extravasations.  Access discontinued per protocol.     Discharge Plan:   Discharge instructions reviewed with: Patient.  Patient and/or family verbalized understanding of discharge instructions and all questions answered.  Patient discharged in stable condition accompanied by: self.  Departure Mode: Ambulatory.      ANABELLE Smith RN

## 2023-04-07 NOTE — TELEPHONE ENCOUNTER
DATE:  4/7/2023   TIME OF RECEIPT FROM LAB:  1455  LAB TEST:  Hgb 6.4  WBC: 1.4  Plt: 18  ANC: 0.3  Last LAB VALUE:  03/24/23  Hgb: 6.2  WBC: 1.4  Plt: 22  ANC: 0.3  TIME LAB VALUE REPORTED TO PROVIDER:     1502 paged   1510  confirmed okay for pt to wait for transfusion scheduled for tomorrow.  1512 Attempted to contact pt to inform them to keep appt for tomorrow. LVM requesting pt return call.  1517 Contacted infusion center to inform them writer attempted to communicate with pt and to keep appt. Charge nurse will attempt to reach pt as well.

## 2023-04-07 NOTE — PROGRESS NOTES
Medical Assistant Note:  Bonny Raymundo presents today for blood draw.    Patient seen by provider today: No.   present during visit today: Not Applicable.    Concerns: No Concerns.    Procedure:  Lab draw site: RAC, Needle type: BF, Gauge: 23g.    Post Assessment:  Labs drawn without difficulty: Yes.    Discharge Plan:  Departure Mode: Ambulatory.    Face to Face Time: 5.    Anahi Cesar, CMA

## 2023-04-08 NOTE — PROGRESS NOTES
Infusion Nursing Note:  Bonny Raymundo presents today for 2 units PRBCs.    Patient seen by provider today: No   present during visit today: Not Applicable.    Note: N/A.    Intravenous Access:  Peripheral IV placed.    Treatment Conditions:  Lab Results   Component Value Date    HGB 6.4 (LL) 04/07/2023    WBC 1.4 (L) 04/07/2023    ANEU 0.3 (LL) 04/07/2023    ANEUTAUTO 1.3 (L) 09/12/2022    PLT 18 (LL) 04/07/2023      Results reviewed, labs MET treatment parameters, ok to proceed with treatment.  Blood transfusion consent signed 1/11/23.    Post Infusion Assessment:  Patient tolerated infusion without incident.  Site patent and intact, free from redness, edema or discomfort.  No evidence of extravasations.  Access discontinued per protocol.     Discharge Plan:   AVS to patient via MYCHART.  Patient will return 4/22/23 for next appointment.   Patient discharged in stable condition accompanied by: self.  Departure Mode: Ambulatory.      Jyoti Upton RN

## 2023-04-11 NOTE — TELEPHONE ENCOUNTER
Reason for Call:  Appointment Request    Patient requesting this type of appt:  Office visit    Requested provider: Shelli Cash    Reason patient unable to be scheduled: Not within requested timeframe    When does patient want to be seen/preferred time: 1-2 weeks    Comments: sore bottom for 2 weeks, no appts available until July    Could we send this information to you in [employer] or would you prefer to receive a phone call?:   Patient would prefer a phone call   Okay to leave a detailed message?: yes, call mobile phone [phone]    Call taken on 4/11/2023 at 9:07 AM by Guerline Perez

## 2023-04-11 NOTE — TELEPHONE ENCOUNTER
I spoke separate phone calls with Bonny and also her son, Shakeel Raymundo. I am concerned that her platelet count is dropping on each check. She reports some forearm bruising, does not think this is getting worse, no other bleeding. Feels better after RBC transfusions. It would be useful for me to reassess her in person in order to help decide if she is now a candidate for palliative azacitidine. Other options are ongoing palliative transfusions of RBC and possibly platelets, or hospice (unfortunatly transfusions not allowed on hospice). Her son asked about the POLST order, but we never received a signed copy of this. Can go over this again at a visit and make sure it is signed. I will have her see me April 20, sooner than the originally scheduled 5/31- keep transfusion appt at [facility] and other appts. Bonny asked about second opinion by Dr. Hernandez. She certainly can do that. I think the likelihood that he will have something different to offer is low.   Minerva Spann MD

## 2023-04-13 NOTE — TELEPHONE ENCOUNTER
Patient states rectal  Bleeding has happened twice. States she has issues with constipation. No diarrhea. Disposition states to be seen within 3 days. Patient states only Monday or Tuesday week of 04/17/23 would work for her. Scheduled.  Appointments in Next Year    Apr 17, 2023  1:00 PM  (Arrive by 12:40 PM)  Provider Visit with KHADRA Sanchez CNP  [facility] ([facility] ) [phone]   Apr 20, 2023 10:30 AM  Lab Peripheral with  [facility] DRAW  [facility] ([facility] ) [phone]   Apr 20, 2023 11:00 AM  (Arrive by 10:45 AM)  Return Patient with Minerva Spann MD  [facility] ([facility] ) [phone]   Apr 22, 2023  9:00 AM  Infusion Visit with  CANCER INFUSION NURSE,  INFUSION BED 2  [facility] ([facility] ) [phone]   May 05, 2023  1:30 PM  Lab Peripheral with [facility] PIV LAB  [facility] ([facility] ) [phone]   May 06, 2023  8:00 AM  Infusion Visit with  CANCER INFUSION NURSE,  INFUSION CHAIR 3  [facility] ([facility] ) [phone]   May 19, 2023 11:00 AM  Lab Peripheral with SH PIV LAB  [facility] ([facility] ) [phone]   May 20, 2023  8:00 AM  Infusion Visit with  CANCER INFUSION NURSE,  INFUSION BED 1  [facility] ([facility] ) [phone]   May 25, 2023  3:00 PM  (Arrive by 2:45 PM)  RETURN NEURO with Kal Padilla MD  [facility] ([facility] ) [phone]   May 31, 2023  9:30 AM  Lab Peripheral with [facility] DRAW  [facility]  [facility] ) [phone]   May 31, 2023 10:00 AM  (Arrive by 9:45 AM)  Return Patient with Minerva Spann MD  [facility] ([facility] ) [phone]        Corrie Arthur RN on 4/13/2023 at 12:02 PM    Reason for Disposition    MILD rectal bleeding (more than just a few drops or streaks)    Additional Information    Negative: Passed out (i.e., fainted, collapsed and was not responding)    Negative: Shock suspected (e.g., cold/pale/clammy skin, too weak to stand, low BP, rapid pulse)    Negative: Vomiting red blood or black (coffee ground) material    Negative: Sounds like a life-threatening emergency to the triager    Negative: Diarrhea is main symptom    Negative: Rectal symptoms    Negative: SEVERE rectal bleeding (large blood clots; constant or on and off bleeding)    Negative: SEVERE dizziness (e.g., unable to stand, requires support to walk, feels like passing out now)    Negative: MODERATE rectal bleeding (small blood clots, passing blood without stool, or toilet water turns red) more than once a day    Negative: Bloody, black, or tarry bowel movements  (Exception: Chronic-unchanged black-grey bowel movements and is taking iron pills or Pepto-Bismol.)    Negative: High-risk adult (e.g., prior surgery on aorta, abdominal aortic aneurysm)    Negative: Rectal foreign body (inserted or swallowed)    Negative: MODERATE rectal bleeding (small blood clots, passing blood without stool, or toilet water turns red)    Negative: Taking Coumadin (warfarin) or other strong blood thinner, or known bleeding disorder (e.g., thrombocytopenia)    Negative: Colonoscopy in past 72 hours    Negative: Known cirrhosis of the liver (or history of liver failure or ascites)    Negative: Patient wants to be seen    Negative: SEVERE abdominal pain (e.g., excruciating)    Negative: Constant abdominal pain lasting > 2 hours    Negative: Pale skin  "(pallor) of new-onset or worsening    Negative: Patient sounds very sick or weak to the triager    Answer Assessment - Initial Assessment Questions  1. APPEARANCE of BLOOD: \"What color is it?\" \"Is it passed separately, on the surface of the stool, or mixed in with the stool?\"       Bright red  2. AMOUNT: \"How much blood was passed?\"       I dont know   3. FREQUENCY: \"How many times has blood been passed with the stools?\"       Once or twice with stools, I pass large BMs  4. ONSET: \"When was the blood first seen in the stools?\" (Days or weeks)       Yesterday  5. DIARRHEA: \"Is there also some diarrhea?\" If Yes, ask: \"How many diarrhea stools in the past 24 hours?\"       No  6. CONSTIPATION: \"Do you have constipation?\" If Yes, ask: \"How bad is it?\"      Yes  7. RECURRENT SYMPTOMS: \"Have you had blood in your stools before?\" If Yes, ask: \"When was the last time?\" and \"What happened that time?\"       No  8. BLOOD THINNERS: \"Do you take any blood thinners?\" (e.g., Coumadin/warfarin, Pradaxa/dabigatran, aspirin)      No, but I have a bleeding disorder  9. OTHER SYMPTOMS: \"Do you have any other symptoms?\"  (e.g., abdomen pain, vomiting, dizziness, fever)      No  10. PREGNANCY: \"Is there any chance you are pregnant?\" \"When was your last menstrual period?\"        N/A    Protocols used: RECTAL BLEEDING-A-OH    "

## 2023-04-20 NOTE — PROGRESS NOTES
Hematology clinic visit  In person visit     Problem list:  - Myelodysplastic syndrome with excess blasts-2 (MDS-EB 2).  Bone marrow biopsy 12/15/2022.   IPSS-R score 8.5-very high - based on cytogenetics -7, > 10% blasts, hemoglobin less than 8, platelet , ANC less than 0.8     Final Diagnosis   A.  Bone Marrow Biopsy from left posterior iliac crest and peripheral smear morphology:  - Myelodysplastic syndrome with excess blasts 2 (MDS-EB2) showing:       - Increased marrow blasts (13.3% on imprint smear differential, 12% on flow cytometry).       - Mildly hypercellular marrow for age , 30-35% .      - Moderately increased marrow fibrosis (MF-2).      - No appreciable dysplasia.      - Increased marrow storage iron with 84% sideroblasts, 6% ringed sideroblasts      - Peripheral blood pancytopenia showing:            - Moderate macrocytic, normochromic anemia without evidence of hemolysis or increased red cell regeneration, rare circulating nucleated red blood cell.           - Moderate leukopenia with rare circulating blasts without Bernice rods.           - Moderate to marked thrombocytopenia.   Electronically signed by Rick Bangura MD on 12/19/2022    Cytogenetics-46,XX,-7,+21[20]  Detected Alterations of Known or Potential Pathogenicity: RUNX1 G199E   Detected Alterations of Uncertain Significance: None   Genes with No Detected Clinically Significant Alterations: ABL1, ALK, ASXL1, ATRX, BCOR, CALR, CBL, CEBPA, CSF3R, DNMT3A, ETV6, EZH2, FLT3, GATA1, GATA2, HRAS, IDH1, IDH2, JAK1, JAK2, JAK3, KIT, KMT2A, KRAS, MPL, NF1, NPM1, NRAS, PDGFRA, PDGFRB, PHF6, PTPN11, JAMA, SETBP1, SF1, SF3A1, SF3B1, SH2B3, SRSF2, TET2, TP53, U2AF2, WT1, ZRSR2     She was initially diagnosed as aplastic anemia-diagnosed 9/26/2016, repeat bone marrow biopsy 12/22/16- bone marrow <5% cellular with no dysplasia.  Normal cytogenetics. PNH negative. 1/9/ 2017-treated with ATG, methylprednisolone, cyclosporine, and eltrombopag.   Cyclosporine discontinued May 2021, eltrombopag discontinued June 2021 due to abnormal liver function test.    Partial remission with the immunotherapy, which is gradually weaned.  Now transfusion dependent  - Chronic kidney disease stage IV  - Atrial fibrillation-on apixaban chronically  -History of iron deficiency anemia- Venofer 300 mg IV 7/7/2021, 8/6/2021, 4/14/2022  -History of B12 deficiency  -MGUS  - COPD  - GERD  -History of lower GI bleed 2017  -Severely decreased bilateral hearing  - Benign paroxysmal positional vertigo  - History of unprovoked left leg deep vein thrombosis (DVT)  3/17/21 and bilateral pulmonary embolism (PE) 9/11/12  - Osteoporosis with compression fracture of spine.  - Shingles right face  2022  - Fall with right hip fracture 1/16/2023  - Subdural hematoma related to fall from standing 1/16/2023-no surgical intervention needed     Interval history:  is here for follow-up of MDS-EB2. She is by herself today.  Her IPSS R score is 8.5, considered very high risk.  Her hemoglobin has not responded to Aranesp, so discontinued.  She is RBC transfusion dependent.  She now has dropping platelet count. Her energy is ok, better after receiving RBC transfusions. She had some unexplained bruises on her chest, which have resolved (showed me photo on her smart phone). Also some bruises on her arms. No epistaxis, melena, hematochezia, hematuria.  No fever, chills sweats, mouth sores. She has some itching of her arms and neck.     She says her appetite is good. She cooks for herself, 4 meals a day, simple meals. She does her own laundry, housekeeping. Has steps in her house, does them slowly. No falls since having the fall that caused hip fracture. She uses a cane. She is trying to walk one block and back daily if the weather is OK. She completed her own taxes.  She is driving around her neighborhood.     PHYSICAL EXAMINATION:  /73   Pulse 107   Temp 97.8  F (36.6  C)   Resp 16   Wt  46.7 kg (103 lb)   SpO2 100%   BMI 19.46 kg/m      General appearance:  Patient is 77 year old woman in no acute distress. Very thin.  She was able to climb up onto exam table without difficulty. She was able to stand up and sit down from her chair 5 times in a row without using arm rests without any difficulty. She was able to walk down the tran at a reasonable pace and push open the heavy door at the end of the tran.   HEENT:  No pallor, icterus, or mucositis. No oral petechiae.    Lungs:  Clear to auscultation bilaterally.   Heart:  Regular rate and rhythm; no S3 S4 or murmer.     Abdomen:  Positive bowel sounds, soft and nontender, nondistended.  No hepatomegaly. No splenomegaly appreciated.    Extremities:  No joint swelling or tenderness.  No ankle edema.     Skin:  No rash, no petechiae. Small senile purpura R forearm. No other ecchymoses.    Labs:     Latest Reference Range & Units 03/24/23 11:06 04/07/23 14:21 04/20/23 10:54   WBC 4.0 - 11.0 10e3/uL 1.4 (L) 1.4 (L) 1.7 (L)   Hemoglobin 11.7 - 15.7 g/dL 6.2 (LL) 6.4 (LL) 6.5 (LL)   Hematocrit 35.0 - 47.0 % 20.4 (L) 20.6 (L) 20.7 (L)   Platelet Count 150 - 450 10e3/uL 22 (LL) 18 (LL) 14 (LL)   RBC Count 3.80 - 5.20 10e6/uL 2.20 (L) 2.16 (L) 2.24 (L)   MCV 78 - 100 fL 93 95 92   MCH 26.5 - 33.0 pg 28.2 29.6 29.0   MCHC 31.5 - 36.5 g/dL 30.4 (L) 31.1 (L) 31.4 (L)   RDW 10.0 - 15.0 % 17.0 (H) 17.8 (H) 17.4 (H)   % Neutrophils % 23 20 29   % Lymphocytes % 68 61 52   % Monocytes % 8 15 16   % Eosinophils % 0 2 1   % Basophils % 0 1 0   % Blasts % 1 1 2   Absolute Basophils 0.0 - 0.2 10e3/uL 0.0 0.0 0.0   NRBC/W <=0 % 1 (H)     Absolute Neutrophil 1.6 - 8.3 10e3/uL 0.3 (LL) 0.3 (LL) 0.5 (L)   Absolute Lymphocytes 0.8 - 5.3 10e3/uL 1.0 0.9 0.9   Absolute Monocytes 0.0 - 1.3 10e3/uL 0.1 0.2 0.3   Absolute Eosinophils 0.0 - 0.7 10e3/uL 0.0 0.0 0.0   Absolute Blasts <=0.0 10e3/uL 0.0 0.0 0.0      Latest Reference Range & Units 04/20/23 10:54   Sodium 136 - 145 mmol/L  141   Potassium 3.4 - 5.3 mmol/L 3.9   Chloride 98 - 107 mmol/L 109 (H)   Carbon Dioxide (CO2) 22 - 29 mmol/L 25   Urea Nitrogen 8.0 - 23.0 mg/dL 29.2 (H)   Creatinine 0.51 - 0.95 mg/dL 1.09 (H)   GFR Estimate >60 mL/min/1.73m2 51 (L)   Calcium 8.8 - 10.2 mg/dL 9.0   Anion Gap 7 - 15 mmol/L 7   Albumin 3.5 - 5.2 g/dL 3.3 (L)   Protein Total 6.4 - 8.3 g/dL 7.6   Alkaline Phosphatase 35 - 104 U/L 136 (H)   ALT 10 - 35 U/L 8 (L)   AST 10 - 35 U/L 11   Bilirubin Total <=1.2 mg/dL 0.6   Glucose 70 - 99 mg/dL 111 (H)   Lactate Dehydrogenase 0 - 250 U/L 166   Uric Acid 2.4 - 5.7 mg/dL 7.0 (H)     Assessment and recommendation:     # MDS-EB 2- IPSS-R score very high risk-this means her median overall survival is 0.8 years and it 25% chance of developing acute leukemia within 0.7 years.  I discussed with her that this is a serious bone marrow problem. She will eventually die from the MDS. Her performance status, activities of daily living and weight have improved since I first met her. I discussed that options are continued supportive care with transfusions or a trial of azacitidine. I emphasized that the azacitidine is palliative- trying to improve the blood counts for a while, but will not cure the MDS. There is about a 50/50 chance that she will have some improvement with the azacitidine (based on one trial that had a 30% response rate  and another that had a 50% response rate).    Azacitidine is given subcutaneous once a day for 5 days every 28 days.  The main risk factor for it is that the blood counts will all drop low and he will be at risk for infection, thrombocytopenia and anemia, and transfusions may be needed. Antibiotics may be needed if there is a fever >100.5 or other signs of infection.  Mild nausea that may occur.  We give anti-nausea medications with it.  There can be some irritation at the subcutaneous infusion site.  It takes at least 3 cycles to see a response. If she is having a response, then the  azacitidine is continued until no longer effective. If she starts the azacitidine and finds that it is too onerous or she does not feel weel with it, she can stop at any time.    She asked if she could keep getting transfusions for a few months, and then decide about azacitidine.  I told her that if she does not start azacitidine very soon- within a couple of weeks, it will be too late too start- will become too debilitate or MDS will progress too far. She asked about second opinion. That is fine with me, but again, she would need to set that up soon. I gave her patient information regarding azacitidine.      Because she is receiving prophylactic transfusions, she unfortunately cannot be transition to hospice. For now, if she decides agains azacitidine, I recommend ongoing tranfusions  I would like to avoid platelet transfusions because they do not last long, but platelets are clearly dropping.  So far no significant bleeding symptoms.    - CBC with differential every 2-3 weeks with RBC transfusions as needed.        #Anemia, thrombocytopenia-this is due to mainly to the MDS.  She has not responded to Aranesp.     - CBC with possible RBC transfusion every 2 weeks  -Transfuse 1 unit PRBCs for hemoglobin 7.0-8.5, 2 units for hemoglobin less than 7.  Arrange for sometime within the next 5 days.  -transfuse 1 unit platelets for platelet count <10k or serious bleeding.       #Failure to thrive- this may be due to the lymphocytic colitis.    However, it may be in part due to decreased meals for her in her home setting.     #Recent subdural hematoma and subarachnoid hemorrhage after a fall- she had a DVT a couple of years ago.  I discussed with her at this point that the risk of bleeding while on anticoagulation, in particular in view of her low platelet count, far outweighs the risk of clotting.    -No apixaban or other anticoagulant  -Continue follow-up with neurosurgery    # elevated uric acid- she will need allopurinol  if starts chemotherapy     #  Hip fracture, frailty- she is able to walk short distances with a cane       #Driving-   recommend against driving     #CODE STATUS- I confirmed with her that she should have  a DO NOT RESUSCITATE order.          [facility] labs and RBC transfusions already scheduled.    RTC 5/31- already scheduled    She asked me to call her son Shakeel. I will reach out to him within the next 2 days. After that, she can let me know her decision about azacitidine.      Time: I spent a total of 90 minutes on the day of the visit. Please see the note for further information on patient assessment and treatment.         Minerva Spann MD  Hematology

## 2023-04-20 NOTE — NURSING NOTE
Chief Complaint   Patient presents with     Labs Only     Venipuncture, vitals checked     Danette Centeno RN on 4/20/2023 at 10:56 AM

## 2023-04-20 NOTE — PATIENT INSTRUCTIONS
Myelodysplastic syndrome (MDS) - risk of developing leukemia   Options continue transfusions or try azacitidine (Vidaza) outpatient chemotherapy.  Must decide soon, or it will be too late to try to help with the azacitidine.   The goal with the azacitdine is palliative- to improve the blood counts for a while, but it will not cure the MDS. There is ~ 50/50 chance that the azacitidine will help.

## 2023-04-20 NOTE — LETTER
4/20/2023         RE: Bonny Raymundo  [address]  [place] [address]        Dear Colleague,    Thank you for referring your patient, Bonny Raymundo, to the [facility]. Please see a copy of my visit note below.    Hematology clinic visit  In person visit     Problem list:  - Myelodysplastic syndrome with excess blasts-2 (MDS-EB 2).  Bone marrow biopsy 12/15/2022.   IPSS-R score 8.5-very high - based on cytogenetics -7, > 10% blasts, hemoglobin less than 8, platelet , ANC less than 0.8     Final Diagnosis   A.  Bone Marrow Biopsy from left posterior iliac crest and peripheral smear morphology:  - Myelodysplastic syndrome with excess blasts 2 (MDS-EB2) showing:       - Increased marrow blasts (13.3% on imprint smear differential, 12% on flow cytometry).       - Mildly hypercellular marrow for age , 30-35% .      - Moderately increased marrow fibrosis (MF-2).      - No appreciable dysplasia.      - Increased marrow storage iron with 84% sideroblasts, 6% ringed sideroblasts      - Peripheral blood pancytopenia showing:            - Moderate macrocytic, normochromic anemia without evidence of hemolysis or increased red cell regeneration, rare circulating nucleated red blood cell.           - Moderate leukopenia with rare circulating blasts without Bernice rods.           - Moderate to marked thrombocytopenia.   Electronically signed by Rick Bangura MD on 12/19/2022    Cytogenetics-46,XX,-7,+21[20]  Detected Alterations of Known or Potential Pathogenicity: RUNX1 G199E   Detected Alterations of Uncertain Significance: None   Genes with No Detected Clinically Significant Alterations: ABL1, ALK, ASXL1, ATRX, BCOR, CALR, CBL, CEBPA, CSF3R, DNMT3A, ETV6, EZH2, FLT3, GATA1, GATA2, HRAS, IDH1, IDH2, JAK1, JAK2, JAK3, KIT, KMT2A, KRAS, MPL, NF1, NPM1, NRAS, PDGFRA, PDGFRB, PHF6, PTPN11, JAMA, SETBP1, SF1, SF3A1, SF3B1, SH2B3, SRSF2, TET2, TP53, U2AF2, WT1, ZRSR2     She  was initially diagnosed as aplastic anemia-diagnosed 9/26/2016, repeat bone marrow biopsy 12/22/16- bone marrow <5% cellular with no dysplasia.  Normal cytogenetics. PNH negative. 1/9/ 2017-treated with ATG, methylprednisolone, cyclosporine, and eltrombopag.  Cyclosporine discontinued May 2021, eltrombopag discontinued June 2021 due to abnormal liver function test.    Partial remission with the immunotherapy, which is gradually weaned.  Now transfusion dependent  - Chronic kidney disease stage IV  - Atrial fibrillation-on apixaban chronically  -History of iron deficiency anemia- Venofer 300 mg IV 7/7/2021, 8/6/2021, 4/14/2022  -History of B12 deficiency  -MGUS  - COPD  - GERD  -History of lower GI bleed 2017  -Severely decreased bilateral hearing  - Benign paroxysmal positional vertigo  - History of unprovoked left leg deep vein thrombosis (DVT)  3/17/21 and bilateral pulmonary embolism (PE) 9/11/12  - Osteoporosis with compression fracture of spine.  - Shingles right face  2022  - Fall with right hip fracture 1/16/2023  - Subdural hematoma related to fall from standing 1/16/2023-no surgical intervention needed     Interval history:  is here for follow-up of MDS-EB2. She is by herself today.  Her IPSS R score is 8.5, considered very high risk.  Her hemoglobin has not responded to Aranesp, so discontinued.  She is RBC transfusion dependent.  She now has dropping platelet count. Her energy is ok, better after receiving RBC transfusions. She had some unexplained bruises on her chest, which have resolved (showed me photo on her smart phone). Also some bruises on her arms. No epistaxis, melena, hematochezia, hematuria.  No fever, chills sweats, mouth sores. She has some itching of her arms and neck.     She says her appetite is good. She cooks for herself, 4 meals a day, simple meals. She does her own laundry, housekeeping. Has steps in her house, does them slowly. No falls since having the fall that caused hip  fracture. She uses a cane. She is trying to walk one block and back daily if the weather is OK. She completed her own taxes.  She is driving around her neighborhood.     PHYSICAL EXAMINATION:  /73   Pulse 107   Temp 97.8  F (36.6  C)   Resp 16   Wt 46.7 kg (103 lb)   SpO2 100%   BMI 19.46 kg/m      General appearance:  Patient is 77 year old woman in no acute distress. Very thin.  She was able to climb up onto exam table without difficulty. She was able to stand up and sit down from her chair 5 times in a row without using arm rests without any difficulty. She was able to walk down the tran at a reasonable pace and push open the heavy door at the end of the tran.   HEENT:  No pallor, icterus, or mucositis. No oral petechiae.    Lungs:  Clear to auscultation bilaterally.   Heart:  Regular rate and rhythm; no S3 S4 or murmer.     Abdomen:  Positive bowel sounds, soft and nontender, nondistended.  No hepatomegaly. No splenomegaly appreciated.    Extremities:  No joint swelling or tenderness.  No ankle edema.     Skin:  No rash, no petechiae. Small senile purpura R forearm. No other ecchymoses.    Labs:     Latest Reference Range & Units 03/24/23 11:06 04/07/23 14:21 04/20/23 10:54   WBC 4.0 - 11.0 10e3/uL 1.4 (L) 1.4 (L) 1.7 (L)   Hemoglobin 11.7 - 15.7 g/dL 6.2 (LL) 6.4 (LL) 6.5 (LL)   Hematocrit 35.0 - 47.0 % 20.4 (L) 20.6 (L) 20.7 (L)   Platelet Count 150 - 450 10e3/uL 22 (LL) 18 (LL) 14 (LL)   RBC Count 3.80 - 5.20 10e6/uL 2.20 (L) 2.16 (L) 2.24 (L)   MCV 78 - 100 fL 93 95 92   MCH 26.5 - 33.0 pg 28.2 29.6 29.0   MCHC 31.5 - 36.5 g/dL 30.4 (L) 31.1 (L) 31.4 (L)   RDW 10.0 - 15.0 % 17.0 (H) 17.8 (H) 17.4 (H)   % Neutrophils % 23 20 29   % Lymphocytes % 68 61 52   % Monocytes % 8 15 16   % Eosinophils % 0 2 1   % Basophils % 0 1 0   % Blasts % 1 1 2   Absolute Basophils 0.0 - 0.2 10e3/uL 0.0 0.0 0.0   NRBC/W <=0 % 1 (H)     Absolute Neutrophil 1.6 - 8.3 10e3/uL 0.3 (LL) 0.3 (LL) 0.5 (L)   Absolute  Lymphocytes 0.8 - 5.3 10e3/uL 1.0 0.9 0.9   Absolute Monocytes 0.0 - 1.3 10e3/uL 0.1 0.2 0.3   Absolute Eosinophils 0.0 - 0.7 10e3/uL 0.0 0.0 0.0   Absolute Blasts <=0.0 10e3/uL 0.0 0.0 0.0      [facility] Reference Range & Units 04/20/23 10:54   Sodium 136 - 145 mmol/L 141   Potassium 3.4 - 5.3 mmol/L 3.9   Chloride 98 - 107 mmol/L 109 (H)   Carbon Dioxide (CO2) 22 - 29 mmol/L 25   Urea Nitrogen 8.0 - 23.0 mg/dL 29.2 (H)   Creatinine 0.51 - 0.95 mg/dL 1.09 (H)   GFR Estimate >60 mL/min/1.73m2 51 (L)   Calcium 8.8 - 10.2 mg/dL 9.0   Anion Gap 7 - 15 mmol/L 7   Albumin 3.5 - 5.2 g/dL 3.3 (L)   Protein Total 6.4 - 8.3 g/dL 7.6   Alkaline Phosphatase 35 - 104 U/L 136 (H)   ALT 10 - 35 U/L 8 (L)   AST 10 - 35 U/L 11   Bilirubin Total <=1.2 mg/dL 0.6   Glucose 70 - 99 mg/dL 111 (H)   Lactate Dehydrogenase 0 - 250 U/L 166   Uric Acid 2.4 - 5.7 mg/dL 7.0 (H)     Assessment and recommendation:     # MDS-EB 2- IPSS-R score very high risk-this means her median overall survival is 0.8 years and it 25% chance of developing acute leukemia within 0.7 years.  I discussed with her that this is a serious bone marrow problem. She will eventually die from the MDS. Her performance status, activities of daily living and weight have improved since I first met her. I discussed that options are continued supportive care with transfusions or a trial of azacitidine. I emphasized that the azacitidine is palliative- trying to improve the blood counts for a while, but will not cure the MDS. There is about a 50/50 chance that she will have some improvement with the azacitidine (based on one trial that had a 30% response rate  and another that had a 50% response rate).    Azacitidine is given subcutaneous once a day for 5 days every 28 days.  The main risk factor for it is that the blood counts will all drop low and he will be at risk for infection, thrombocytopenia and anemia, and transfusions may be needed. Antibiotics may be needed if there is a  fever >100.5 or other signs of infection.  Mild nausea that may occur.  We give anti-nausea medications with it.  There can be some irritation at the subcutaneous infusion site.  It takes at least 3 cycles to see a response. If she is having a response, then the azacitidine is continued until no longer effective. If she starts the azacitidine and finds that it is too onerous or she does not feel weel with it, she can stop at any time.    She asked if she could keep getting transfusions for a few months, and then decide about azacitidine.  I told her that if she does not start azacitidine very soon- within a couple of weeks, it will be too late too start- will become too debilitate or MDS will progress too far. She asked about second opinion. That is fine with me, but again, she would need to set that up soon. I gave her patient information regarding azacitidine.      Because she is receiving prophylactic transfusions, she unfortunately cannot be transition to hospice. For now, if she decides agains azacitidine, I recommend ongoing tranfusions  I would like to avoid platelet transfusions because they do not last long, but platelets are clearly dropping.  So far no significant bleeding symptoms.    - CBC with differential every 2-3 weeks with RBC transfusions as needed.        #Anemia, thrombocytopenia-this is due to mainly to the MDS.  She has not responded to Aranesp.     - CBC with possible RBC transfusion every 2 weeks  -Transfuse 1 unit PRBCs for hemoglobin 7.0-8.5, 2 units for hemoglobin less than 7.  Arrange for sometime within the next 5 days.  -transfuse 1 unit platelets for platelet count <10k or serious bleeding.       #Failure to thrive- this may be due to the lymphocytic colitis.    However, it may be in part due to decreased meals for her in her home setting.     #Recent subdural hematoma and subarachnoid hemorrhage after a fall- she had a DVT a couple of years ago.  I discussed with her at this point  that the risk of bleeding while on anticoagulation, in particular in view of her low platelet count, far outweighs the risk of clotting.    -No apixaban or other anticoagulant  -Continue follow-up with neurosurgery    # elevated uric acid- she will need allopurinol if starts chemotherapy     #  Hip fracture, frailty- she is able to walk short distances with a cane       #Driving-   recommend against driving     #CODE STATUS- I confirmed with her that she should have  a DO NOT RESUSCITATE order.          [facility] labs and RBC transfusions already scheduled.    RTC 5/31- already scheduled    She asked me to call her son Shakeel. I will reach out to him within the next 2 days. After that, she can let me know her decision about azacitidine.      Time: I spent a total of 90 minutes on the day of the visit. Please see the note for further information on patient assessment and treatment.         Minerva Spann MD  Hematology

## 2023-04-20 NOTE — NURSING NOTE
"Oncology Rooming Note    April 20, 2023 11:14 AM   Bonny Raymundo is a 77 year old female who presents for:    Chief Complaint   Patient presents with     Labs Only     Venipuncture, vitals checked     Oncology Clinic Visit     RTN for AA     Initial Vitals: Blood Pressure 119/73   Pulse 107   Temperature 97.8  F (36.6  C)   Respiration 16   Weight 46.7 kg (103 lb)   Oxygen Saturation 100%   Body Mass Index 19.46 kg/m   Estimated body mass index is 19.46 kg/m  as calculated from the following:    Height as of 1/4/23: 1.549 m (5' 1\").    Weight as of this encounter: 46.7 kg (103 lb). Body surface area is 1.42 meters squared.  No Pain (0) Comment: Data Unavailable   No LMP recorded. Patient has had a hysterectomy.  Allergies reviewed: Yes  Medications reviewed: Yes    Medications: Medication refills not needed today.  Pharmacy name entered into [facility]:    [facility] [place] - [address], [address]  [facility]  [facility] [address]/[id number] - [place] - [address] [facility]    Clinical concerns: none       Skylar Hood MA            "

## 2023-04-21 NOTE — PROGRESS NOTES
[facility]: Cancer Care                                                                                      RN Cancer Care Coordinator from [facility] at [facility] in [place], called patient in response to request for 2nd opinion. This patient is known to writer in writer's personal life.     Was informed that she had called clinic requesting a 2nd opinion from Dr. Hernandez. Dr. Hernandez was notified and did a chart review. He informed writer that he does not have anything different to offer patient than what Dr. Spann is suggesting. He informed writer that I should call and let pt know this. If she would like to come in and meet with him, and hear if from another source, he would be very glad to meet with her.     Writer left voice message of above information on pt's voice mail.   Offered for her to call back and writer will be glad to explain further, or assist in anyway possible.    Signature:    Sloane Quezada RN   Cancer Care Nurse Coordinator  [facility]  [phone]

## 2023-04-21 NOTE — TELEPHONE ENCOUNTER
I spoke with Bonny's son, Shakeel Raymundo, going over the rationale for considering starting azacitidine.He is in aggreement with me that she seems to be more mobile and sharp lately.  If she wants a second opinion, that is reasonable, but I recommend that they seek this soon. She should start azacitidine soon, if that is her decision, or the MDS will have progressed too far for it to be of much help. I request that they let me know if they decide to go forward with Azacitidine, so that I can order it and have infusion appts made at [facility].  Minerva Spann MD

## 2023-04-22 NOTE — PROGRESS NOTES
"Infusion Nursing Note:  Bonny Raymundo presents today for 2 units PRBCs.    Patient seen by provider today: No   present during visit today: Not Applicable.    Note: Patient reports no new concerns since exam with Dr. Spann on 4/20/23. Platelets noted at 14, per Dr. Spann's note, \"transfuse 1 unit platelets for platelet count <10k or serious bleeding.\" Patient reports easy bruising, but denies nosebleeds, blood in urine or stool or any other signs of active bleeding.      Intravenous Access:  Peripheral IV placed.    Treatment Conditions:  Lab Results   Component Value Date    HGB 6.5 (LL) 04/20/2023    WBC 1.7 (L) 04/20/2023    ANEU 0.5 (L) 04/20/2023    ANEUTAUTO 1.3 (L) 09/12/2022    PLT 14 (LL) 04/20/2023      Results reviewed, labs MET treatment parameters for 2 units PRBCs, ok to proceed with treatment.  Blood transfusion consent signed 1/11/23.      Post Infusion Assessment:  Patient tolerated transfusion without incident.  Blood return noted pre and post infusion.  Site patent and intact, free from redness, edema or discomfort.  No evidence of extravasations.  Access discontinued per protocol.       Discharge Plan:   Discharge instructions reviewed with: Patient.  Patient and/or family verbalized understanding of discharge instructions and all questions answered.  AVS to patient via MediameetingT.  Patient will return 5/5 & 5/6 for next appointment.   Patient discharged in stable condition accompanied by: self.  Departure Mode: Ambulatory.      Demetrice Connolly RN  "

## 2023-04-24 NOTE — PROGRESS NOTES
[facility]: Cancer Care                                                                                          Pt called writer.  She states that while she has every confidence in Dr Spann, both herself and her family would like a second opinion on diagnosis and treatment.  She is requesting to be set up with Dr Hernandez at [facility].  IB sent to put navigation team to help facilitate this appointment.      Signature:  Sue Marquez RN

## 2023-04-26 PROBLEM — D69.6 THROMBOCYTOPENIA (H): Status: ACTIVE | Noted: 2023-01-01

## 2023-04-26 NOTE — PROGRESS NOTES
[facility]: Cancer Care                                                                                      RN Cancer Care Coordinator called patient in response to a email message. She has an appt for 2nd opinion scheduled for May 31.     In speaking with her she said she had not gotten the voice message writer left last week. Writer explained that Dr. Hernandez did review her chart and doesn't have anything additional to offer her for treatment, however he is very willing to meet with her to go over her condition if she would find that helpful. She states that her son wasn't able to attend her recent visit with Dr. Spann, and so she would appreciate the appt with Dr. Hernandez, and would like to bring her son to be able to understand better what is going on.   Writer was able, with Dr. Hernandez's approval, to find an appt sooner, and so moved it to Friday May 12. 1:30. Pt was called and states they can come that day.    Signature:  Sloane Quezada RN

## 2023-04-27 NOTE — PROGRESS NOTES
[facility]: Cancer Care                                                                                          Spoke with pt.  She is having a tooth pulled on 5/11/23.  Dr Spann would like for her to get a unit of platelets the day prior, preferably in the afternoon.  Pt prefers [facility], but they have no availability on the 10th.  IB sent to scheduling to help find a location and time for the 10th. Will call pt back with appt date and time once secured.      Signature:  Sue Marquez RN

## 2023-05-05 NOTE — PROGRESS NOTES
Medical Assistant Note:  Bonny Raymundo presents today for blood draw.    Patient seen by provider today: No.   present during visit today: Not Applicable.    Concerns: No Concerns.    Procedure:  Lab draw site: rac, Needle type: bf, Gauge: 23.    Post Assessment:  Labs drawn without difficulty: Yes.    Discharge Plan:  Departure Mode: Ambulatory.    Face to Face Time: 5 min.    Bianca King, CMA

## 2023-05-06 NOTE — PROGRESS NOTES
Infusion Nursing Note:  Bonny Raymundo presents today for 2u RBC transfusion.    Patient seen by provider today: No   present during visit today: Not Applicable.    Note: N/A.      Intravenous Access:  Peripheral IV placed.    Treatment Conditions:  Lab Results   Component Value Date    HGB 6.5 (LL) 05/05/2023    WBC 2.4 (L) 05/05/2023    ANEU 1.0 (L) 05/05/2023    ANEUTAUTO 1.3 (L) 09/12/2022    PLT 18 (LL) 05/05/2023      Results reviewed, labs MET treatment parameters, ok to proceed with treatment.  Blood transfusion consent signed 1/11/23.      Post Infusion Assessment:  Patient tolerated infusion without incident.  Site patent and intact, free from redness, edema or discomfort.  No evidence of extravasations.  Access discontinued per protocol.       Discharge Plan:   Patient and/or family verbalized understanding of discharge instructions and all questions answered.  AVS to patient via MYCHART.  Patient will return for next appointment.   Patient discharged in stable condition accompanied by: self.  Departure Mode: Ambulatory.      Laisha Jeffery RN

## 2023-05-09 NOTE — PATIENT INSTRUCTIONS
Thank you for choosing [facility] Podiatry / Foot & Ankle Surgery!    DR. JERNIGAN'S CLINIC LOCATIONS:     [facility] TRIAGE LINE: [phone]   [address] APPOINTMENTS: [phone]   [place] [address] RADIOLOGY: [phone]   (Every other Tues - Wed - Fri PM) SET UP SURGERY: [phone]    PHYSICAL THERAPY: [phone]   [facility] SPECIALTY BILLING QUESTIONS: [phone] [address] FAX: [phone]   [place] [address]    (Thurs & Fri AM)       CALLUS / CORNS / IPKs  When there is excessive friction or pressure on the skin, the body responds by making the skin thicker to protect the deeper structures from becoming exposed. While this works well to protect the deeper structures, the thickened skin can increase pressure and pain.    CALLUS: Flat, diffuse thickening are simple calluses and they are usually caused by friction. Often these are the result of rubbing on a shoe or going barefoot.    CORNS: Calluses with a central core between the toes are called corns. These result from prominent joints on adjacent toes rubbing together. Theses are a symptom of bone malalignment and will always recur unless the underlying bones are addressed surgically.    IPKs: Calluses with a central core on the ball of the foot are usually IPKs (intractable plantar keratosis). These are caused by excessive pressure from the metatarsals, the bones that make up the ball of the foot. Often one of these bones is too long or too prominent.  Again, these will always recur unless the underlying bone issue is addressed. There is no cure for these. They will either go away by themselves, recur, or more could develop.    ROUTINE MAINTENANCE  1. File them down with a pumice stone or callus file a couple times a week.   2. An electric callus removing device. Amope Pedi Perfect Electronic Pedicure Foot File and Callus Remover can be a good option.   3. Lotion can be applied to soften the callus. A urea  based cream such as Kersal or Vanicream or thicker cream with shea butter are good options.  4. Toe spacers or toe covers can be used for corns, gel pads can be used for other lesions on the bottom of the foot.   If there is a surgical pathology noted, such as a prominent bone, often this needs to be addressed surgically to minimize recurrence. However, sometimes the lesion simply migrates to another spot after surgery, so it is not a guaranteed cure.     **If you come back to clinic for treatment, insurance does not cover it, and you would be billed. This charge could range from $100 - $227**    Here is a list of routine foot care resources, which includes toenail trimming and callus/corn management.     This is not a referral. It is your responsibility to contact the organization and your insurance to confirm cost and coverage.      ROUTINE FOOT CARE (NAIL TRIMMING / CALLUSES)      [facility]  [phone]   [facility]  [phone]  Multiple locations   [facility]  [phone] Dr. Sheikh and Dr. Pace  [address] [facility] [address] [place] [address]  [phone]         Follow Up: As Needed

## 2023-05-09 NOTE — PROGRESS NOTES
ASSESSMENT:  Encounter Diagnoses   Name Primary?     Toe pain, left Yes     Corn of left second toe      Hammer toe of left second toe      MEDICAL DECISION MAKING:  I discussed the cause and nature of corns and calluses.  I explained that the lesion developed due to high stress on the skin at the distal aspect of the left second toe.  This is related to the contracture of the toe, the gait cycle, and ultimately her pes planus.    Using #15 scalpel, the hyperkeratotic eschar was pared down.  The nucleus was cored out revealing a superficial ulceration.  This explains her pain.  A Band-Aid was applied.  This should be less painful and heal in a timely fashion now that the callus tissue is removed.    I explained that future paring of the lesion, if no coexisting ulceration, will likely result in an out-of-pocket expense and requires that an ABN waiver form be signed.    I recommend she make sure her shoes are of appropriate size, not too short.  We discussed the use of a crest pad to elevate the tip of the toe.  Pictures of surgery device were provided as well as explaining how to use it.    Should conservative options not provide adequate pain relief, I did discuss the option of a flexor tenotomy procedure.    Follow up on an as-needed basis.        Disclaimer: This note consists of symbols derived from keyboarding, dictation and/or voice recognition software. As a result, there may be errors in the script that have gone undetected. Please consider this when interpreting information found in this chart.    Garrett Feliciano DPM, FACFAS, MS    [place] Department of Podiatry/Foot & Ankle Surgery      ____________________________________________________________________    HPI:       Bonny presents today reporting left second toe pain when walking  Onset 3 weeks  Aching, throbbing, tingling  She has pain daily with walking  She specifies the tip of the left second toe.  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     Sensorineural hearing loss, unspecified      Sprain of ankle, unspecified site     L   *  *  Past Surgical History:   Procedure Laterality Date     ARTHRODESIS FOOT  11    Hallux valgus R-1st MP joint     BLEPHAROPLASTY BILATERAL  ,      BONE MARROW BIOPSY, BONE SPECIMEN, NEEDLE/TROCAR N/A 2016    Procedure: BIOPSY BONE MARROW;  Surgeon: Mu Morgan MD;  Location:  GI     BONE MARROW BIOPSY, BONE SPECIMEN, NEEDLE/TROCAR N/A 2016    Procedure: BIOPSY BONE MARROW;  Surgeon: Mu Morgan MD;  Location:  GI     C DEXA INTERPRETATION, AXIAL  03     CATARACT IOL, RT/LT Right      CATARACT IOL, RT/LT Left      COLONOSCOPY N/A 2018    Procedure: COLONOSCOPY;  colonoscopy;  Surgeon: Meliton Castrejon MD;  Location:  GI     EXCHANGE INTRAOCULAR LENS IMPLANT Right 2015    Procedure: EXCHANGE INTRAOCULAR LENS IMPLANT;  Surgeon: Garrett Dawson MD;  Location:  EC     PICC INSERTION Left 2017    5fr DL BioFlo PICC, 42cm (2cm external) in the L basilic vein w/ tip in the  SVC RA junction.     VITRECTOMY PARSPLANA WITH 23 GAUGE SYSTEM Right 2015    Procedure: VITRECTOMY PARSPLANA WITH 23 GAUGE SYSTEM;  Surgeon: Racheal Loyd MD;  Location:  EC     ZZC NONSPECIFIC PROCEDURE           ZZC NONSPECIFIC PROCEDURE      hysterectomy/BSO     ZZC NONSPECIFIC PROCEDURE      myomectomy (fibroids)     ZZHC COLONOSCOPY THRU STOMA, DIAGNOSTIC      normal- minimal diverticulosis   *  *  Current Outpatient  Medications   Medication Sig Dispense Refill     Acetaminophen (TYLENOL EX ST ARTHRITIS PAIN PO) Take 650 mg by mouth Twice daily       bisacodyl (DULCOLAX) 5 MG EC tablet Take 2 tablets at 3 pm the day before your procedure. If your procedure is before 11 am, take 2 additional tablets at 11 pm. If your procedure is after 11 am, take 2 additional tablets at 6 am. For additional instructions refer to your colonoscopy prep instructions. (Patient not taking: Reported on 2/8/2023) 4 tablet 0     diphenhydrAMINE (BENADRYL) 25 MG tablet Take 25 mg by mouth At Bedtime  56 tablet      loperamide (IMODIUM A-D) 2 MG tablet Take 0.5 tablets (1 mg) by mouth daily (Patient not taking: Reported on 2/8/2023)       melatonin 3 MG tablet Take 1 tablet by mouth At Bedtime (Patient not taking: Reported on 4/20/2023)       multivitamin w/minerals (THERA-VIT-M) tablet Take 1 tablet by mouth daily       pantoprazole (PROTONIX) 20 MG EC tablet TAKE 1 TABLET BY MOUTH EVERY DAY 90 tablet 11     polyethylene glycol (GOLYTELY) 236 g suspension The night before the exam at 6 pm drink an 8-ounce glass every 15 minutes until the jug is half empty. If you arrive before 11 AM: Drink the other half of the Golytely jug at 11 PM night before procedure. If you arrive after 11 AM: Drink the other half of the Golytely jug at 6 AM day of procedure. For additional instructions refer to your colonoscopy prep instructions. (Patient not taking: Reported on 2/8/2023) 4000 mL 0     polyethylene glycol (MIRALAX) 17 GM/Dose powder Take 17 g by mouth (Patient not taking: Reported on 2/8/2023)       zinc oxide (DESITIN) 40 % external ointment Apply topically as needed for irritation (Patient not taking: Reported on 4/20/2023) 56 g 0         EXAM:    Vitals: /72   BMI: There is no height or weight on file to calculate BMI.    Constitutional:  Bonny Raymundo is in no apparent distress, appears well-nourished.  Cooperative with history and physical  exam.    Vascular:  Pedal pulses are palpable for both the DP and PT arteries.  CFT < 3 sec.  No edema.      Neuro: Light touch sensation is intact to the L4, L5, S1 distributions  No evidence of weakness, spasticity, or contracture in the lower extremities.     Derm: Normal texture and turgor.  No erythema, ecchymosis, or cyanosis.  No open lesions.   There is a small, nucleated hyperkeratotic lesion at the distal aspect of the left second toe.  Post paring and excising the nucleus, there is an erythematous spot indicative of a small ulceration to the level of deeper skin.    Musculoskeletal:   Severe pes planus, left greater than right.  Complete collapse of the medial longitudinal arch.  Digital contractures.  The left second toe is largely reducible with some rigidity at the distal interphalangeal joint.

## 2023-05-09 NOTE — LETTER
5/9/2023         RE: Bonny Raymundo  [address]  [facility] [address]        Dear Colleague,    Thank you for referring your patient, Bonny Raymundo, to the [facility]. Please see a copy of my visit note below.    ASSESSMENT:  Encounter Diagnoses   Name Primary?     Toe pain, left Yes     Corn of left second toe      Hammer toe of left second toe      MEDICAL DECISION MAKING:  I discussed the cause and nature of corns and calluses.  I explained that the lesion developed due to high stress on the skin at the distal aspect of the left second toe.  This is related to the contracture of the toe, the gait cycle, and ultimately her pes planus.    Using #15 scalpel, the hyperkeratotic eschar was pared down.  The nucleus was cored out revealing a superficial ulceration.  This explains her pain.  A Band-Aid was applied.  This should be less painful and heal in a timely fashion now that the callus tissue is removed.    I explained that future paring of the lesion, if no coexisting ulceration, will likely result in an out-of-pocket expense and requires that an ABN waiver form be signed.    I recommend she make sure her shoes are of appropriate size, not too short.  We discussed the use of a crest pad to elevate the tip of the toe.  Pictures of surgery device were provided as well as explaining how to use it.    Should conservative options not provide adequate pain relief, I did discuss the option of a flexor tenotomy procedure.    Follow up on an as-needed basis.        Disclaimer: This note consists of symbols derived from keyboarding, dictation and/or voice recognition software. As a result, there may be errors in the script that have gone undetected. Please consider this when interpreting information found in this chart.    Garrett Feliciano DPM, FACFAS, MS    [place] Department of Podiatry/Foot & Ankle  Surgery      ____________________________________________________________________    HPI:       Bonny presents today reporting left second toe pain when walking  Onset 3 weeks  Aching, throbbing, tingling  She has pain daily with walking  She specifies the tip of the left second toe.  *  Past Medical History:   Diagnosis Date     Allergic rhinitis due to other allergen      Aplastic anemia (H) 01/09/2017     Closed anterior dislocation of humerus      DVT of lower extremity (deep venous thrombosis) (H) 10/2012    Left after prolonged sitting-plane     DVT, recurrent, lower extremity, acute (H) 2014     Headache(784.0)      Osteoporosis, unspecified      Pulmonary emboli (H) 10/2012     RAEB-2 (refractory anemia with excess blasts-2) (H) 12/24/2022     Sensorineural hearing loss, unspecified      Sprain of ankle, unspecified site     L   *  *  Past Surgical History:   Procedure Laterality Date     ARTHRODESIS FOOT  7-18-11    Hallux valgus R-1st MP joint     BLEPHAROPLASTY BILATERAL  2012, 2014     BONE MARROW BIOPSY, BONE SPECIMEN, NEEDLE/TROCAR N/A 9/26/2016    Procedure: BIOPSY BONE MARROW;  Surgeon: Mu Morgan MD;  Location: [facility]     BONE MARROW BIOPSY, BONE SPECIMEN, NEEDLE/TROCAR N/A 11/21/2016    Procedure: BIOPSY BONE MARROW;  Surgeon: Mu Morgan MD;  Location:  GI     C DEXA INTERPRETATION, AXIAL  8-20-03     CATARACT IOL, RT/LT Right 1988     CATARACT IOL, RT/LT Left 1985     COLONOSCOPY N/A 5/24/2018    Procedure: COLONOSCOPY;  colonoscopy;  Surgeon: Meliton Castrejon MD;  Location:  GI     EXCHANGE INTRAOCULAR LENS IMPLANT Right 2/2/2015    Procedure: EXCHANGE INTRAOCULAR LENS IMPLANT;  Surgeon: Garrett Dawson MD;  Location:  EC     PICC INSERTION Left 1/9/2017    5fr DL BioFlo PICC, 42cm (2cm external) in the L basilic vein w/ tip in the  SVC RA junction.     VITRECTOMY PARSPLANA WITH 23 GAUGE SYSTEM Right 2/2/2015    Procedure: VITRECTOMY PARSPLANA WITH  23 GAUGE SYSTEM;  Surgeon: Racheal Loyd MD;  Location:  EC     [facility] NONSPECIFIC PROCEDURE           Z NONSPECIFIC PROCEDURE      hysterectomy/BSO     ZC NONSPECIFIC PROCEDURE      myomectomy (fibroids)     [facility]      normal- minimal diverticulosis   *  *  Current Outpatient Medications   Medication Sig Dispense Refill     Acetaminophen (TYLENOL EX ST ARTHRITIS PAIN PO) Take 650 mg by mouth Twice daily       bisacodyl (DULCOLAX) 5 MG EC tablet Take 2 tablets at 3 pm the day before your procedure. If your procedure is before 11 am, take 2 additional tablets at 11 pm. If your procedure is after 11 am, take 2 additional tablets at 6 am. For additional instructions refer to your colonoscopy prep instructions. (Patient not taking: Reported on 2023) 4 tablet 0     diphenhydrAMINE (BENADRYL) 25 MG tablet Take 25 mg by mouth At Bedtime  56 tablet      loperamide (IMODIUM A-D) 2 MG tablet Take 0.5 tablets (1 mg) by mouth daily (Patient not taking: Reported on 2023)       melatonin 3 MG tablet Take 1 tablet by mouth At Bedtime (Patient not taking: Reported on 2023)       multivitamin w/minerals (THERA-VIT-M) tablet Take 1 tablet by mouth daily       pantoprazole (PROTONIX) 20 MG EC tablet TAKE 1 TABLET BY MOUTH EVERY DAY 90 tablet 11     polyethylene glycol (GOLYTELY) 236 g suspension The night before the exam at 6 pm drink an 8-ounce glass every 15 minutes until the jug is half empty. If you arrive before 11 AM: Drink the other half of the Golytely jug at 11 PM night before procedure. If you arrive after 11 AM: Drink the other half of the Golytely jug at 6 AM day of procedure. For additional instructions refer to your colonoscopy prep instructions. (Patient not taking: Reported on 2023) 4000 mL 0     polyethylene glycol (MIRALAX) 17 GM/Dose powder Take 17 g by mouth (Patient not taking: Reported on 2023)       zinc oxide (DESITIN) 40 %  external ointment Apply topically as needed for irritation (Patient not taking: Reported on 4/20/2023) 56 g 0         EXAM:    Vitals: /72   BMI: There is no height or weight on file to calculate BMI.    Constitutional:  Bonny Raymundo is in no apparent distress, appears well-nourished.  Cooperative with history and physical exam.    Vascular:  Pedal pulses are palpable for both the DP and PT arteries.  CFT < 3 sec.  No edema.      Neuro: Light touch sensation is intact to the L4, L5, S1 distributions  No evidence of weakness, spasticity, or contracture in the lower extremities.     Derm: Normal texture and turgor.  No erythema, ecchymosis, or cyanosis.  No open lesions.   There is a small, nucleated hyperkeratotic lesion at the distal aspect of the left second toe.  Post paring and excising the nucleus, there is an erythematous spot indicative of a small ulceration to the level of deeper skin.    Musculoskeletal:   Severe pes planus, left greater than right.  Complete collapse of the medial longitudinal arch.  Digital contractures.  The left second toe is largely reducible with some rigidity at the distal interphalangeal joint.          Again, thank you for allowing me to participate in the care of your patient.        Sincerely,        Garrett Feliciano DPM

## 2023-05-10 NOTE — PROGRESS NOTES
Infusion Nursing Note:  Bonny Raymundo presents today for 1 bag platelets.  Patient seen by provider today: No   present during visit today: Not Applicable.    Note: Patient reports to feeling well today.  Patient has received PRBCs in the past but today is her first bag of platelets. Receiving platelets today prior to a scheduled tooth extraction tomorrow.      Intravenous Access:  Peripheral IV placed.    Treatment Conditions:  Lab Results   Component Value Date    HGB 6.5 (LL) 05/05/2023    WBC 2.4 (L) 05/05/2023    ANEU 1.0 (L) 05/05/2023    ANEUTAUTO 1.3 (L) 09/12/2022    PLT 18 (LL) 05/05/2023      Blood transfusion consent signed 1/11/23.      Post Infusion Assessment:  Patient tolerated infusion without incident.  Blood return noted pre and post infusion.  Site patent and intact, free from redness, edema or discomfort.  No evidence of extravasations.  Access discontinued per protocol.       Discharge Plan:   Patient declined prescription refills.  Discharge instructions reviewed with: Patient.  Patient and/or family verbalized understanding of discharge instructions and all questions answered.  AVS to patient via [facility].  Patient will return 5/19/23 for lab draw for next appointment.   Patient discharged in stable condition accompanied by: self.  Departure Mode: Ambulatory.      Sue Conner RN

## 2023-05-12 NOTE — LETTER
"    5/12/2023         RE: Bonny Raymundo  [address]  [facility] [address]        Dear Colleague,    Thank you for referring your patient, Bonny Raymundo, to the [facility] [place]. Please see a copy of my visit note below.    Oncology Rooming Note    May 12, 2023 1:44 PM   Bonny Raymundo is a 77 year old female who presents for:    Chief Complaint   Patient presents with     Hematology     New patient consult for 2nd opinion related to aplastic anemia      Initial Vitals: /67 (BP Location: Right arm, Patient Position: Sitting, Cuff Size: Adult Small)   Pulse 93   Temp 98.4  F (36.9  C) (Tympanic)   Resp 16   Ht 1.549 m (5' 1\")   Wt 46.7 kg (103 lb)   SpO2 97%   BMI 19.46 kg/m   Estimated body mass index is 19.46 kg/m  as calculated from the following:    Height as of this encounter: 1.549 m (5' 1\").    Weight as of this encounter: 46.7 kg (103 lb). Body surface area is 1.42 meters squared.  No Pain (0) Comment: Data Unavailable   No LMP recorded. Patient has had a hysterectomy.  Allergies reviewed: Yes  Medications reviewed: Yes    Medications: Medication refills not needed today.  Pharmacy name entered into [facility]:    [facility] [place], [place] - [address], [address]  [facility] [place] - [address] [id number] - [place], [place] - [address] [facility]    Clinical concerns: New patient consult for 2nd opinion related to aplastic anemia       KATHY MILLS [facility] Hematology and Oncology Consult Note    Patient: Bonny Raymundo  MRN: [id number]  Date of Service: 05/12/2023      Reason for Visit    Chief Complaint   Patient presents with     Hematology     New patient consult for 2nd opinion related to aplastic anemia          Assessment/Plan    Problem List Items Addressed This Visit        Immune    Pancytopenia (H)       Hematologic    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  I reviewed her previous history of aplastic anemia and its treatment along with recent bone marrow biopsy report showing high-grade MDS.  On R-IPSS she belongs to a very high risk category with a score of 8.5.  She has poor risk cytogenetics.  Median overall survival is 0.8 years.  Looks like she has clonally evolved from her previous aplastic anemia.     I explained to her that this is not a curable disease.  Allogenic bone marrow transplant is the only curative option and due to her age and multiple medical issues is not a candidate for 1.  She is transfusion dependent for both platelets and PRBC.  I explained to her that treatment depends upon both her medical fitness and her goals of care.  Although she has multiple medical issues she is still independent and wants to improve both her quality of life and quantity of life. She does not have any actionable mutations.   In this setting I absolutely agree with Dr. Spnan that she needs to start HMA therapy ASAP.  Choice is between Vidaza or decitabine.  Only Vidaza has been shown to improve overall survival.  Due to her age, a 5-day treatment rather than 7 days treatment every 4 weeks is very appropriate.  Oral decitabine with cedazuridine is another option (although studies included only lower and intermediate risk MDS, but convenience wise this could be something she can consider).  Although initially we do expect her counts to drop further and expect her transfusion needs to go up, but the idea is to reduce the blast count in the bone marrow to allow for the normal cells to grow and ultimately hope to achieve the goal of transfusion independency to some extent.  But there are associated complications including increased risk of  infections, increased dependency on transfusions which could potentially lead to iron overload, bleeding etc.     She wants to think about it further.  I asked her to consider making decisions rather on an urgent basis.  I am not sure if she truly understands the gravity of the situation.  If she does not she will proceed with low intensity treatments then transition to hospice and supportive care would be the obvious next thing to do.      ECOG Performance    2 - Ambulatory and independent in all ADLs; cannot work; up > 50% of the time    Problem List    Patient Active Problem List   Diagnosis     DIARRHEA     Esophageal reflux     Chest pain     Osteoporosis     CARDIOVASCULAR SCREENING; LDL GOAL LESS THAN 160     Seasonal allergic rhinitis     Health Care Home     Sensorineural hearing loss of both ears     BPPV (benign paroxysmal positional vertigo)     Anemia     DVT, recurrent, lower extremity, acute (H)     Advanced directives, counseling/discussion     Pancytopenia (H)     Thyroid nodule     History of pulmonary embolism     Diplopia     Pseudophakia     Myopia of both eyes     Osteoporotic compression fracture of spine, with routine healing, subsequent encounter     Personal history of other drug therapy     Dyspnea, unspecified type     CKD (chronic kidney disease) stage 4, GFR 15-29 ml/min (H)     Anemia of chronic renal failure, stage 4 (severe) (H)     Urinary problem     Intertriginous candidiasis     Cellulitis, unspecified cellulitis site     Vitamin D deficiency     Loss of balance     Post herpetic neuralgia     Intertrigo labialis     Itching     High priority for 2019-nCoV vaccine     Need for prophylactic vaccination and inoculation against influenza     Other neutropenia (H)     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      History of presenting illness:  Bonny Raymundo is a 77-year-old female with history of aplastic anemia status post triple immunosuppression therapy and eltrombopag, now with high-grade MDS, transfusion dependent who is seen in hematology clinic to discuss further management.    She was diagnosed with aplastic anemia in September 2016.  At that time bone marrow biopsy showed less than 5% cellularity without any evidence of dysplasia.  Cytogenetics was normal.  PNH screen was negative.  She was initially treated with triple ID with ATG, steroids and cyclosporine along with eltrombopag.  Did have a decent response.  Became transfusion independent.  Cyclosporine was stopped in May 2021 due to kidney function abnormalities and eltrombopag was stopped at around same time due to elevated liver function tests.  Her hemoglobin and platelet count started to drop late last year and currently she is transfusion dependent.  Bone marrow biopsy done in December 2022 showed hypercellular marrow with changes consistent with MDS with excess blasts 2, with a marrow blast percentage 13.3%.  There was moderately increased bone marrow fibrosis.  6% ring sideroblasts.  Peripheral blood also showed recently blasts.  Comprehensive myeloid malignancy NGS showed ROS1 mutation and cytogenetics showed trisomy 21 and monosomy 7.  Very high risk MDS.  Did not respond to Aranesp.  She is currently getting both platelet and PRBC transfusion pretty much once every other week.  Ferritin was elevated but thousand earlier this year.  She met with Dr. Spann at the [facility] and recommended to start HMA therapy with Vidaza ASAP.  She is currently has not made decision yet.  She is worried about potential complications and side effects.    Her other medical issues include history of DVT in 2021.  Was on apixaban until earlier this year.  She was hospitalized after a fall and was  diagnosed with subdural and subarachnoid hemorrhage.  Anticoagulation was discontinued.    As mentioned earlier she also has stage IV chronic kidney disease.  Has lost significant weight.  Feels weak and fatigued.  Transfusions do help a little bit.  Fortunately no infectious complications.      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) 12/24/2022     Sensorineural hearing loss, unspecified      Sprain of ankle, unspecified site     L    Family History   Problem Relation Age of Onset     Musculoskeletal Disorder Mother         MS     Heart Disease Father      Heart Disease Brother         afib      Past Surgical History:   Procedure Laterality Date     ARTHRODESIS FOOT  7-18-11    Hallux valgus R-1st MP joint     BLEPHAROPLASTY BILATERAL  2012, 2014     BONE MARROW BIOPSY, BONE SPECIMEN, NEEDLE/TROCAR N/A 9/26/2016    Procedure: BIOPSY BONE MARROW;  Surgeon: Mu Morgan MD;  Location: [facility]     BONE MARROW BIOPSY, BONE SPECIMEN, NEEDLE/TROCAR N/A 11/21/2016    Procedure: BIOPSY BONE MARROW;  Surgeon: Mu Morgan MD;  Location:  GI     C DEXA INTERPRETATION, AXIAL  8-20-03     CATARACT IOL, RT/LT Right 1988     CATARACT IOL, RT/LT Left 1985     COLONOSCOPY N/A 5/24/2018    Procedure: COLONOSCOPY;  colonoscopy;  Surgeon: Meliton Castrejon MD;  Location:  GI     EXCHANGE INTRAOCULAR LENS IMPLANT Right 2/2/2015    Procedure: EXCHANGE INTRAOCULAR LENS IMPLANT;  Surgeon: Garrett Dawson MD;  Location:  EC     PICC INSERTION Left 1/9/2017    5fr DL  BioFlo PICC, 42cm (2cm external) in the L basilic vein w/ tip in the  SVC RA junction.     VITRECTOMY PARSPLANA WITH 23 GAUGE SYSTEM Right 2015    Procedure: VITRECTOMY PARSPLANA WITH 23 GAUGE SYSTEM;  Surgeon: Racheal Loyd MD;  Location: [facility] NONSPECIFIC PROCEDURE           ZZC NONSPECIFIC PROCEDURE      hysterectomy/BSO     ZZC NONSPECIFIC PROCEDURE      myomectomy (fibroids)     ZZHC COLONOSCOPY THRU STOMA, DIAGNOSTIC      normal- minimal diverticulosis    Social History     Socioeconomic History     Marital status:      Spouse name: Not on file     Number of children: 1     Years of education: Not on file     Highest education level: Not on file   Occupational History     Occupation: purchasing [employer]   Tobacco Use     Smoking status: Former     Types: Cigarettes     Quit date: 1973     Years since quittin.0     Passive exposure: Past     Smokeless tobacco: Never   Vaping Use     Vaping status: Never Used     Passive vaping exposure: Yes   Substance and Sexual Activity     Alcohol use: No     Alcohol/week: 0.0 standard drinks of alcohol     Comment: occasionally     Drug use: No     Sexual activity: Not Currently     Partners: Male   Other Topics Concern     Not on file   Social History Narrative     Not on file     Social Determinants of Health     Financial Resource Strain: Not on file   Food Insecurity: Not on file   Transportation Needs: Not on file   Physical Activity: Not on file   Stress: Not on file   Social Connections: Not on file   Intimate Partner Violence: Not At Risk (2023)    Humiliation, Afraid, Rape, and Kick questionnaire      Fear of Current or Ex-Partner: No      Emotionally Abused: No      Physically Abused: No      Sexually Abused: No   Housing Stability: Not on file        Allergies    Allergies   Allergen Reactions     Cats      Dogs      Seasonal Allergies        Review of Systems    Pertinent items are noted in  HPI.      Physical Exam        5/31/2023     5:06 PM   Oncology Vitals   /75   Pulse 86   Temp 98.8  F (37.1  C)   Temp src Oral   SpO2 100 %       General: alert and cooperative  HEENT: Head: Normal, normocephalic, atraumatic.  Eye: Normal external eye, conjunctiva, lids cornea, RYAN.  Extremities: atraumatic, no peripheral edema  Skin: No rashes  CNS: Alert and oriented x3, neurologic exam grossly normal.        Lab Results    Recent Results (from the past 168 hour(s))   Basic metabolic panel   Result Value Ref Range    Sodium 141 136 - 145 mmol/L    Potassium 3.6 3.4 - 5.3 mmol/L    Chloride 108 (H) 98 - 107 mmol/L    Carbon Dioxide (CO2) 25 22 - 29 mmol/L    Anion Gap 8 7 - 15 mmol/L    Urea Nitrogen 39.2 (H) 8.0 - 23.0 mg/dL    Creatinine 1.27 (H) 0.51 - 0.95 mg/dL    Calcium 9.2 8.8 - 10.2 mg/dL    Glucose 119 (H) 70 - 99 mg/dL    GFR Estimate 43 (L) >60 mL/min/1.73m2   CBC with platelets and differential   Result Value Ref Range    WBC Count 2.4 (L) 4.0 - 11.0 10e3/uL    RBC Count 2.18 (L) 3.80 - 5.20 10e6/uL    Hemoglobin 6.3 (LL) 11.7 - 15.7 g/dL    Hematocrit 20.3 (L) 35.0 - 47.0 %    MCV 93 78 - 100 fL    MCH 28.9 26.5 - 33.0 pg    MCHC 31.0 (L) 31.5 - 36.5 g/dL    RDW 14.7 10.0 - 15.0 %    Platelet Count 9 (LL) 150 - 450 10e3/uL   Adult Type and Screen   Result Value Ref Range    ABO/RH(D) A POS     Antibody Screen Negative Negative    SPECIMEN EXPIRATION DATE [id number]    Manual Differential   Result Value Ref Range    % Neutrophils 22 %    % Lymphocytes 50 %    % Monocytes 24 %    % Eosinophils 0 %    % Basophils 1 %    % Blasts 3 %    Absolute Neutrophils 0.5 (L) 1.6 - 8.3 10e3/uL    Absolute Lymphocytes 1.2 0.8 - 5.3 10e3/uL    Absolute Monocytes 0.6 0.0 - 1.3 10e3/uL    Absolute Eosinophils 0.0 0.0 - 0.7 10e3/uL    Absolute Basophils 0.0 0.0 - 0.2 10e3/uL    Absolute Blasts 0.1 (H) <=0.0 10e3/uL    RBC Morphology Confirmed RBC Indices     Platelet Assessment  Automated Count Confirmed.  Platelet morphology is normal.     Automated Count Confirmed. Platelet morphology is normal.    Quang Cells Slight (A) None Seen   Prepare pheresed platelets (unit)   Result Value Ref Range    Blood Component Type Platelets     Product Code D4066I31     Unit Status Ready for issue     Unit Number [id number]     CODING SYSTEM [id number]    Prepare pheresed platelets (unit)   Result Value Ref Range    ISSUE DATE AND TIME [id number]     Blood Component Type Platelets     Product Code I1476Y82     Unit Status Transfused     Unit Number [id number]     UNIT ABO/RH A+     CODING SYSTEM [id number]     UNIT TYPE ISBT 6200    Prepare red blood cells (unit)   Result Value Ref Range    Blood Component Type Red Blood Cells     Product Code C2384Y72     Unit Status Transfused     Unit Number [id number]     CROSSMATCH Compatible     CODING SYSTEM [id number]     ISSUE DATE AND TIME [id number]     UNIT ABO/RH A-     UNIT TYPE ISBT 0600    Prepare red blood cells (unit)   Result Value Ref Range    Blood Component Type Red Blood Cells     Product Code F5773V55     Unit Status Transfused     Unit Number [id number]     CROSSMATCH Compatible     CODING SYSTEM [id number]     ISSUE DATE AND TIME [id number]     UNIT ABO/RH A-     UNIT TYPE ISBT 0600        Imaging Results    No results found.     Complex patient.    A total of 45 minutes was spent today on this visit including face to face conversation with the patient, EMR review (labs, imaging studies, pathology reports and outside records), counseling and care co-ordination and documentation.    Signed by: Rachel Hernandez MD        Again, thank you for allowing me to participate in the care of your patient.        Sincerely,        Rachel Hernandez MD

## 2023-05-12 NOTE — PROGRESS NOTES
"Oncology Rooming Note    May 12, 2023 1:44 PM   Bonny Raymundo is a 77 year old female who presents for:    Chief Complaint   Patient presents with     Hematology     New patient consult for 2nd opinion related to aplastic anemia      Initial Vitals: /67 (BP Location: Right arm, Patient Position: Sitting, Cuff Size: Adult Small)   Pulse 93   Temp 98.4  F (36.9  C) (Tympanic)   Resp 16   Ht 1.549 m (5' 1\")   Wt 46.7 kg (103 lb)   SpO2 97%   BMI 19.46 kg/m   Estimated body mass index is 19.46 kg/m  as calculated from the following:    Height as of this encounter: 1.549 m (5' 1\").    Weight as of this encounter: 46.7 kg (103 lb). Body surface area is 1.42 meters squared.  No Pain (0) Comment: Data Unavailable   No LMP recorded. Patient has had a hysterectomy.  Allergies reviewed: Yes  Medications reviewed: Yes    Medications: Medication refills not needed today.  Pharmacy name entered into [facility]:    [facility] [facility] - [address], [address]  [facility] [place], [place] - [address] [id number] - [place], [place] - [address] [facility]    Clinical concerns: New patient consult for 2nd opinion related to aplastic anemia       KATHY MILLS CMA            "

## 2023-05-15 NOTE — PROGRESS NOTES
[facility]: Cancer Care                                                                                          Pt called writer this afternoon and stated she has decided that she would like to start azacitidine.  She saw Dr Hernandez last week and after that visit has decided to move forward.  IB sent to Dr Spann to order the therapy.  Will set pt up at [facility] infusion [facility] after orders are entered per pt request.  Writer has done chemo teaching in the past, but will send pt written information in the mail on the medication and go over side effects with pt over the phone.  Will also send neutropenia and chemotherapy at home information.       Signature:  Sue Marquez RN

## 2023-05-16 NOTE — PROGRESS NOTES
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  I reviewed her previous history of aplastic anemia and its treatment along with recent bone marrow biopsy report showing high-grade MDS.  On R-IPSS she belongs to a very high risk category with a score of 8.5.  She has poor risk cytogenetics.  Median overall survival is 0.8 years.  Looks like she has clonally evolved from her previous aplastic anemia.     I explained to her that this is not a curable disease.  Allogenic bone marrow transplant is the only curative option and due to her age and multiple medical issues is not a candidate for 1.  She is transfusion dependent for both platelets and PRBC.  I explained to her that treatment depends upon both her medical fitness and her goals of care.  Although she has multiple medical issues she is still independent and wants to improve both her quality of life and quantity of life. She does not have any actionable mutations.   In this setting I absolutely agree with Dr. Spann that she needs to start HMA therapy ASAP.  Choice is between Vidaza or decitabine.  Only Vidaza has been shown to improve overall survival.  Due to her age, a 5-day treatment rather than 7 days treatment every 4 weeks is very appropriate.  Oral decitabine with cedazuridine is another option (although studies included only  lower and intermediate risk MDS, but convenience wise this could be something she can consider).  Although initially we do expect her counts to drop further and expect her transfusion needs to go up, but the idea is to reduce the blast count in the bone marrow to allow for the normal cells to grow and ultimately hope to achieve the goal of transfusion independency to some extent.  But there are associated complications including increased risk of infections, increased dependency on transfusions which could potentially lead to iron overload, bleeding etc.     She wants to think about it further.  I asked her to consider making decisions rather on an urgent basis.  I am not sure if she truly understands the gravity of the situation.  If she does not she will proceed with low intensity treatments then transition to hospice and supportive care would be the obvious next thing to do.      ECOG Performance    2 - Ambulatory and independent in all ADLs; cannot work; up > 50% of the time    Problem List    Patient Active Problem List   Diagnosis     DIARRHEA     Esophageal reflux     Chest pain     Osteoporosis     CARDIOVASCULAR SCREENING; LDL GOAL LESS THAN 160     Seasonal allergic rhinitis     Health Care Home     Sensorineural hearing loss of both ears     BPPV (benign paroxysmal positional vertigo)     Anemia     DVT, recurrent, lower extremity, acute (H)     Advanced directives, counseling/discussion     Pancytopenia (H)     Thyroid nodule     History of pulmonary embolism     Diplopia     Pseudophakia     Myopia of both eyes     Osteoporotic compression fracture of spine, with routine healing, subsequent encounter     Personal history of other drug therapy     Dyspnea, unspecified type     CKD (chronic kidney disease) stage 4, GFR 15-29 ml/min (H)     Anemia of chronic renal failure, stage 4 (severe) (H)     Urinary problem     Intertriginous candidiasis     Cellulitis, unspecified cellulitis site     Vitamin D  deficiency     Loss of balance     Post herpetic neuralgia     Intertrigo labialis     Itching     High priority for 2019-nCoV vaccine     Need for prophylactic vaccination and inoculation against influenza     Other neutropenia (H)     RAEB-2 (refractory anemia with excess blasts-2) (H)     Thrombocytopenia (H)     Dehydration     ______________________________________________________________________________    Staging History     Cancer Staging   No matching staging information was found for the patient.      History of presenting illness:  Bonny Raymundo is a 77-year-old female with history of aplastic anemia status post triple immunosuppression therapy and eltrombopag, now with high-grade MDS, transfusion dependent who is seen in hematology clinic to discuss further management.    She was diagnosed with aplastic anemia in September 2016.  At that time bone marrow biopsy showed less than 5% cellularity without any evidence of dysplasia.  Cytogenetics was normal.  PNH screen was negative.  She was initially treated with triple ID with ATG, steroids and cyclosporine along with eltrombopag.  Did have a decent response.  Became transfusion independent.  Cyclosporine was stopped in May 2021 due to kidney function abnormalities and eltrombopag was stopped at around same time due to elevated liver function tests.  Her hemoglobin and platelet count started to drop late last year and currently she is transfusion dependent.  Bone marrow biopsy done in December 2022 showed hypercellular marrow with changes consistent with MDS with excess blasts 2, with a marrow blast percentage 13.3%.  There was moderately increased bone marrow fibrosis.  6% ring sideroblasts.  Peripheral blood also showed recently blasts.  Comprehensive myeloid malignancy NGS showed ROS1 mutation and cytogenetics showed trisomy 21 and monosomy 7.  Very high risk MDS.  Did not respond to Aranesp.  She is currently getting both platelet and PRBC transfusion  pretty much once every other week.  Ferritin was elevated but thousand earlier this year.  She met with Dr. Spann at the [facility] and recommended to start HMA therapy with Vidaza ASAP.  She is currently has not made decision yet.  She is worried about potential complications and side effects.    Her other medical issues include history of DVT in 2021.  Was on apixaban until earlier this year.  She was hospitalized after a fall and was diagnosed with subdural and subarachnoid hemorrhage.  Anticoagulation was discontinued.    As mentioned earlier she also has stage IV chronic kidney disease.  Has lost significant weight.  Feels weak and fatigued.  Transfusions do help a little bit.  Fortunately no infectious complications.      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) 12/24/2022     Sensorineural hearing loss, unspecified      Sprain of ankle, unspecified site     L    Family History   Problem Relation Age of Onset     Musculoskeletal Disorder Mother         MS     Heart Disease Father      Heart Disease Brother         afib      Past Surgical History:   Procedure Laterality Date     ARTHRODESIS FOOT  7-18-11    Hallux valgus R-1st MP joint     BLEPHAROPLASTY BILATERAL  2012, 2014     BONE MARROW BIOPSY, BONE SPECIMEN, NEEDLE/TROCAR N/A 9/26/2016    Procedure: BIOPSY BONE MARROW;  Surgeon: Mu Morgan MD;  Location: [facility]     BONE MARROW BIOPSY, BONE SPECIMEN, NEEDLE/TROCAR N/A 11/21/2016    Procedure: BIOPSY BONE MARROW;  Surgeon: Mu Morgan MD;   Location:  GI     C DEXA INTERPRETATION, AXIAL  03     CATARACT IOL, RT/LT Right      CATARACT IOL, RT/LT Left      COLONOSCOPY N/A 2018    Procedure: COLONOSCOPY;  colonoscopy;  Surgeon: Meliton Castrejon MD;  Location:  GI     EXCHANGE INTRAOCULAR LENS IMPLANT Right 2015    Procedure: EXCHANGE INTRAOCULAR LENS IMPLANT;  Surgeon: Garrett Dawson MD;  Location:  EC     PICC INSERTION Left 2017    5fr DL BioFlo PICC, 42cm (2cm external) in the L basilic vein w/ tip in the  SVC RA junction.     VITRECTOMY PARSPLANA WITH 23 GAUGE SYSTEM Right 2015    Procedure: VITRECTOMY PARSPLANA WITH 23 GAUGE SYSTEM;  Surgeon: Racheal Loyd MD;  Location:  EC     ZZC NONSPECIFIC PROCEDURE           ZZC NONSPECIFIC PROCEDURE      hysterectomy/BSO     ZZC NONSPECIFIC PROCEDURE      myomectomy (fibroids)     ZZHC COLONOSCOPY THRU STOMA, DIAGNOSTIC      normal- minimal diverticulosis    Social History     Socioeconomic History     Marital status:      Spouse name: Not on file     Number of children: 1     Years of education: Not on file     Highest education level: Not on file   Occupational History     Occupation: purchasing [employer]   Tobacco Use     Smoking status: Former     Types: Cigarettes     Quit date: 1973     Years since quittin.0     Passive exposure: Past     Smokeless tobacco: Never   Vaping Use     Vaping status: Never Used     Passive vaping exposure: Yes   Substance and Sexual Activity     Alcohol use: No     Alcohol/week: 0.0 standard drinks of alcohol     Comment: occasionally     Drug use: No     Sexual activity: Not Currently     Partners: Male   Other Topics Concern     Not on file   Social History Narrative     Not on file     Social Determinants of Health     Financial Resource Strain: Not on file   Food Insecurity: Not on file   Transportation Needs: Not on file   Physical Activity: Not on file   Stress: Not on file    Social Connections: Not on file   Intimate Partner Violence: Not At Risk (5/31/2023)    Humiliation, Afraid, Rape, and Kick questionnaire      Fear of Current or Ex-Partner: No      Emotionally Abused: No      Physically Abused: No      Sexually Abused: No   Housing Stability: Not on file        Allergies    Allergies   Allergen Reactions     Cats      Dogs      Seasonal Allergies        Review of Systems    Pertinent items are noted in HPI.      Physical Exam        5/31/2023     5:06 PM   Oncology Vitals   /75   Pulse 86   Temp 98.8  F (37.1  C)   Temp src Oral   SpO2 100 %       General: alert and cooperative  HEENT: Head: Normal, normocephalic, atraumatic.  Eye: Normal external eye, conjunctiva, lids cornea, RYAN.  Extremities: atraumatic, no peripheral edema  Skin: No rashes  CNS: Alert and oriented x3, neurologic exam grossly normal.        Lab Results    Recent Results (from the past 168 hour(s))   Basic metabolic panel   Result Value Ref Range    Sodium 141 136 - 145 mmol/L    Potassium 3.6 3.4 - 5.3 mmol/L    Chloride 108 (H) 98 - 107 mmol/L    Carbon Dioxide (CO2) 25 22 - 29 mmol/L    Anion Gap 8 7 - 15 mmol/L    Urea Nitrogen 39.2 (H) 8.0 - 23.0 mg/dL    Creatinine 1.27 (H) 0.51 - 0.95 mg/dL    Calcium 9.2 8.8 - 10.2 mg/dL    Glucose 119 (H) 70 - 99 mg/dL    GFR Estimate 43 (L) >60 mL/min/1.73m2   CBC with platelets and differential   Result Value Ref Range    WBC Count 2.4 (L) 4.0 - 11.0 10e3/uL    RBC Count 2.18 (L) 3.80 - 5.20 10e6/uL    Hemoglobin 6.3 (LL) 11.7 - 15.7 g/dL    Hematocrit 20.3 (L) 35.0 - 47.0 %    MCV 93 78 - 100 fL    MCH 28.9 26.5 - 33.0 pg    MCHC 31.0 (L) 31.5 - 36.5 g/dL    RDW 14.7 10.0 - 15.0 %    Platelet Count 9 (LL) 150 - 450 10e3/uL   Adult Type and Screen   Result Value Ref Range    ABO/RH(D) A POS     Antibody Screen Negative Negative    SPECIMEN EXPIRATION DATE [id number]    Manual Differential   Result Value Ref Range    % Neutrophils 22 %    % Lymphocytes 50  %    % Monocytes 24 %    % Eosinophils 0 %    % Basophils 1 %    % Blasts 3 %    Absolute Neutrophils 0.5 (L) 1.6 - 8.3 10e3/uL    Absolute Lymphocytes 1.2 0.8 - 5.3 10e3/uL    Absolute Monocytes 0.6 0.0 - 1.3 10e3/uL    Absolute Eosinophils 0.0 0.0 - 0.7 10e3/uL    Absolute Basophils 0.0 0.0 - 0.2 10e3/uL    Absolute Blasts 0.1 (H) <=0.0 10e3/uL    RBC Morphology Confirmed RBC Indices     Platelet Assessment  Automated Count Confirmed. Platelet morphology is normal.     Automated Count Confirmed. Platelet morphology is normal.    [place] Cells Slight (A) None Seen   Prepare pheresed platelets (unit)   Result Value Ref Range    Blood Component Type Platelets     Product Code U8821V59     Unit Status Ready for issue     Unit Number [id number]     CODING SYSTEM [id number]    Prepare pheresed platelets (unit)   Result Value Ref Range    ISSUE DATE AND TIME [id number]     Blood Component Type Platelets     Product Code E7025J68     Unit Status Transfused     Unit Number [id number]     UNIT ABO/RH A+     CODING SYSTEM UVZE709     UNIT TYPE ISBT 6200    Prepare red blood cells (unit)   Result Value Ref Range    Blood Component Type Red Blood Cells     Product Code S0199M64     Unit Status Transfused     Unit Number [id number]     CROSSMATCH Compatible     CODING SYSTEM MXIR240     ISSUE DATE AND TIME [id number]     UNIT ABO/RH A-     UNIT TYPE ISBT 0600    Prepare red blood cells (unit)   Result Value Ref Range    Blood Component Type Red Blood Cells     Product Code Q4702Q89     Unit Status Transfused     Unit Number [id number]     CROSSMATCH Compatible     CODING SYSTEM ZNAI214     ISSUE DATE AND TIME [id number]     UNIT ABO/RH A-     UNIT TYPE ISBT 0600        Imaging Results    No results found.     Complex patient.    A total of 45 minutes was spent today on this visit including face to face conversation with the patient, EMR review (labs, imaging studies, pathology reports and outside records),  counseling and care co-ordination and documentation.    Signed by: Rachel Hernandez MD

## 2023-05-16 NOTE — PROGRESS NOTES
[facility]: Cancer Care                                                                                          Spoke with pt on the phone and went over common side effects of azacitidine, neutropenia precautions and chemo at home info sheet.  Sent info in mail with writers card so pt can reach out with further questions or concerns. Pt stated understanding and transferred pt call to Linda in scheduling to help arrange treatment.      Signature:  Sue Marquez RN

## 2023-05-17 NOTE — TELEPHONE ENCOUNTER
"[facility]: Cancer Care                                                                                      RN Cancer Care Coordinator stayed with patient for Dr. Hernandez 2nd opinion visit, per her request.     She asked several questions about the proposed drug protocol from Dr. Spann. He let her know that he completely agreed with the plan, and it is what he would offer her as well.     She states that she doesn't want to feel worse than she already feels, and Dr. Hernandez was very explicit that she may feel more fatigued and somewhat worse for a while during chemo, but the idea is to fight the cancer and hopefully slow the progression of the disease. She asked about her risk of converting to Leukemia and he told her that it was \"knocking on the door.\" He encouraged her to make her decision to start the chemo as soon as possible.     She verbalized appreciation for the conversation and reinforcement of her needs.    Signature:  Sloane Quezada RN  "

## 2023-05-19 NOTE — TELEPHONE ENCOUNTER
RN spoke to Sue, RNCC for Dr. Spann, regarding platelet count of 15 today.  No need for platelet transfusion.  Patient will be getting 1 unit of pRBC's tomorrow for hgb 7.3.

## 2023-05-20 NOTE — PROGRESS NOTES
Infusion Nursing Note:  Bonny Raymundo presents today for 1 unit PRBC.    Patient seen by provider today: No   present during visit today: Not Applicable.    Note: N/A.      Intravenous Access:  Peripheral IV placed.    Treatment Conditions:  Blood transfusion consent signed 1/11/23.      Post Infusion Assessment:  Patient tolerated infusion without incident.  Blood return noted pre and post infusion.  Site patent and intact, free from redness, edema or discomfort.  No evidence of extravasations.  Access discontinued per protocol.       Discharge Plan:   AVS to patient via MYCHART.  Patient will return 5/22/23 for next appointment.   Patient discharged in stable condition accompanied by: self.  Departure Mode: Ambulatory.      Lory Buenrostro RN

## 2023-05-22 NOTE — PROGRESS NOTES
Infusion Nursing Note:  Bonny PASHA Zackary presents today for vidaza.    Patient seen by provider today: No   present during visit today: Not Applicable.    Note: pt denies any changes in health or new concerns.      Intravenous Access:  No Intravenous access/labs at this visit.    Treatment Conditions:  Lab Results   Component Value Date    HGB 7.3 (L) 05/19/2023    WBC 3.2 (L) 05/19/2023    ANEU 1.5 (L) 05/19/2023    ANEUTAUTO 1.3 (L) 09/12/2022    PLT 15 (LL) 05/19/2023      Lab Results   Component Value Date     05/19/2023    POTASSIUM 4.0 05/19/2023    MAG 1.8 06/02/2022    CR 1.37 (H) 05/19/2023    LEO 9.0 05/19/2023    BILITOTAL 1.1 05/19/2023    ALBUMIN 3.1 (L) 05/19/2023    ALT 8 (L) 05/19/2023    AST 11 05/19/2023         Post Infusion Assessment:  Patient tolerated injection without incident.  Site patent and intact, free from redness, edema or discomfort.  No evidence of extravasations.  Access discontinued per protocol.       Discharge Plan:   Patient and/or family verbalized understanding of discharge instructions and all questions answered.  AVS to patient via [employer].  Patient will return 5/23 for next appointment.   Patient discharged in stable condition accompanied by: self.  Departure Mode: Ambulatory.      Laisha Jeffery RN

## 2023-05-23 NOTE — PROGRESS NOTES
Infusion Nursing Note:  Bonny Raymundo presents today for Cycle 2 Day 1 Vidaza SQ.    Patient seen by provider today: No   present during visit today: Not Applicable.    Note: N/A.      Intravenous Access:  No Intravenous access/labs at this visit.    Treatment Conditions:  See results from 5/19/23.      Post Infusion Assessment:  Patient tolerated injection in lower left abdominal quadrant without incident.       Discharge Plan:   Patient declined prescription refills.  Discharge instructions reviewed with: Patient.  Patient verbalized understanding of discharge instructions and all questions answered.  AVS to patient via [employer].  Patient will return 5/24/23 for next appointment.   Patient discharged in stable condition accompanied by: self.  Departure Mode: Ambulatory.      Jennie Grant RN

## 2023-05-24 NOTE — PROGRESS NOTES
Infusion Nursing Note:  Bonny Raymundo presents today for vidaza.    Patient seen by provider today: No   present during visit today: Not Applicable.    Note: N/A.      Intravenous Access:  No Intravenous access/labs at this visit.    Treatment Conditions:  Not Applicable.      Post Infusion Assessment:  Patient tolerated infusion without incident.  Blood return noted pre and post infusion.  Site patent and intact, free from redness, edema or discomfort.  No evidence of extravasations.  Access discontinued per protocol.       Discharge Plan:   Discharge instructions reviewed with: Patient.  Patient and/or family verbalized understanding of discharge instructions and all questions answered.  Patient discharged in stable condition accompanied by: self.  Departure Mode: Ambulatory.      Kathy Corea RN

## 2023-05-25 NOTE — PROGRESS NOTES
Infusion Nursing Note:  Bonny Raymundo presents today for vidaza.    Patient seen by provider today: No   present during visit today: Not Applicable.    Note: N/A.      Intravenous Access:  No Intravenous access/labs at this visit.    Treatment Conditions:  Lab Results   Component Value Date    HGB 7.3 (L) 05/19/2023    WBC 3.2 (L) 05/19/2023    ANEU 1.5 (L) 05/19/2023    ANEUTAUTO 1.3 (L) 09/12/2022    PLT 15 (LL) 05/19/2023      Lab Results   Component Value Date     05/19/2023    POTASSIUM 4.0 05/19/2023    MAG 1.8 06/02/2022    CR 1.37 (H) 05/19/2023    LEO 9.0 05/19/2023    BILITOTAL 1.1 05/19/2023    ALBUMIN 3.1 (L) 05/19/2023    ALT 8 (L) 05/19/2023    AST 11 05/19/2023     Results reviewed, labs MET treatment parameters, ok to proceed with treatment.      Post Infusion Assessment:  Patient tolerated injection without incident.  Site patent and intact, free from redness, edema or discomfort.       Discharge Plan:   [facility]  Patient will return 5/26 for next appointment.   Patient discharged in stable condition accompanied by: self.  Departure Mode: Ambulatory.      James Parikh, RN

## 2023-05-26 NOTE — PROGRESS NOTES
Infusion Nursing Note:  Bonny Raymundo presents today for C1D5 Vidaza.    Patient seen by provider today: No   present during visit today: Not Applicable.    Note: No changes or new concerns today.      Intravenous Access:  No Intravenous access/labs at this visit.    Treatment Conditions:  Lab Results   Component Value Date    HGB 7.3 (L) 05/19/2023    WBC 3.2 (L) 05/19/2023    ANEU 1.5 (L) 05/19/2023    ANEUTAUTO 1.3 (L) 09/12/2022    PLT 15 (LL) 05/19/2023      Lab Results   Component Value Date     05/19/2023    POTASSIUM 4.0 05/19/2023    MAG 1.8 06/02/2022    CR 1.37 (H) 05/19/2023    LEO 9.0 05/19/2023    BILITOTAL 1.1 05/19/2023    ALBUMIN 3.1 (L) 05/19/2023    ALT 8 (L) 05/19/2023    AST 11 05/19/2023     Results reviewed, labs MET treatment parameters, ok to proceed with treatment.      Post Infusion Assessment:  Patient tolerated injection without incident.  Site patent and intact, free from redness, edema or discomfort.  No evidence of extravasations.       Discharge Plan:   Discharge instructions reviewed with: Patient.  Patient and/or family verbalized understanding of discharge instructions and all questions answered.  AVS to patient via [employer].  Patient will return 5/31/23 at [facility] for next appointment.   Patient discharged in stable condition accompanied by: self.  Departure Mode: Ambulatory.      Shant Nieto RN

## 2023-05-31 PROBLEM — E86.0 DEHYDRATION: Status: ACTIVE | Noted: 2023-01-01

## 2023-05-31 NOTE — NURSING NOTE
"Oncology Rooming Note    May 31, 2023 10:08 AM   Bonny Raymundo is a 77 year old female who presents for:    Chief Complaint   Patient presents with     Oncology Clinic Visit     Idiopathic aplastic anemia      Labs Only     Labs drawn via piv by VAT in lab, vitals completed     Initial Vitals: BP (!) 82/55   Pulse 112   Temp 97.5  F (36.4  C) (Oral)   Resp 16   Wt 44.3 kg (97 lb 11.2 oz)   SpO2 100%   BMI 18.46 kg/m   Estimated body mass index is 18.46 kg/m  as calculated from the following:    Height as of 5/12/23: 1.549 m (5' 1\").    Weight as of this encounter: 44.3 kg (97 lb 11.2 oz). Body surface area is 1.38 meters squared.  Data Unavailable Comment: Data Unavailable   No LMP recorded. Patient has had a hysterectomy.  Allergies reviewed: Yes  Medications reviewed: Yes    Medications: Medication refills not needed today.  Pharmacy name entered into [facility]:    [facility] [place] [place] - [address], [address]  [facility] [place], [place] - [address]  [id number] [place], [place] - [address]    Clinical concerns: NONE      Graciela Cadet            "

## 2023-05-31 NOTE — LETTER
5/31/2023         RE: Bonny Raymundo  [address]  [facility] [id number]        Dear Colleague,    Thank you for referring your patient, Bonny Raymundo, to the [facility]. Please see a copy of my visit note below.    Hematology clinic visit  In person visit     Problem list:  - Myelodysplastic syndrome with excess blasts-2 (MDS-EB 2).  Bone marrow biopsy 12/15/2022.   IPSS-R score 8.5-very high - based on cytogenetics -7, > 10% blasts, hemoglobin less than 8, platelet , ANC less than 0.8     Final Diagnosis   A.  Bone Marrow Biopsy from left posterior iliac crest and peripheral smear morphology:  - Myelodysplastic syndrome with excess blasts 2 (MDS-EB2) showing:       - Increased marrow blasts (13.3% on imprint smear differential, 12% on flow cytometry).       - Mildly hypercellular marrow for age , 30-35% .      - Moderately increased marrow fibrosis (MF-2).      - No appreciable dysplasia.      - Increased marrow storage iron with 84% sideroblasts, 6% ringed sideroblasts      - Peripheral blood pancytopenia showing:            - Moderate macrocytic, normochromic anemia without evidence of hemolysis or increased red cell regeneration, rare circulating nucleated red blood cell.           - Moderate leukopenia with rare circulating blasts without Bernice rods.           - Moderate to marked thrombocytopenia.   Electronically signed by Rick Bangura MD on 12/19/2022    Cytogenetics-46,XX,-7,+21[20]  Detected Alterations of Known or Potential Pathogenicity: RUNX1 G199E   Detected Alterations of Uncertain Significance: None   Genes with No Detected Clinically Significant Alterations: ABL1, ALK, ASXL1, ATRX, BCOR, CALR, CBL, CEBPA, CSF3R, DNMT3A, ETV6, EZH2, FLT3, GATA1, GATA2, HRAS, IDH1, IDH2, JAK1, JAK2, JAK3, KIT, KMT2A, KRAS, MPL, NF1, NPM1, NRAS, PDGFRA, PDGFRB, PHF6, PTPN11, JAMA, SETBP1, SF1, SF3A1, SF3B1, SH2B3, SRSF2, TET2, TP53, U2AF2, WT1, ZRSR2     She  was initially diagnosed as aplastic anemia-diagnosed 9/26/2016, repeat bone marrow biopsy 12/22/16- bone marrow <5% cellular with no dysplasia.  Normal cytogenetics. PNH negative. 1/9/ 2017-treated with ATG, methylprednisolone, cyclosporine, and eltrombopag.  Cyclosporine discontinued May 2021, eltrombopag discontinued June 2021 due to abnormal liver function test.    Partial remission with the immunotherapy, which is gradually weaned.  Now transfusion dependent  - Chronic kidney disease stage IV  - Atrial fibrillation-on apixaban chronically  -History of iron deficiency anemia- Venofer 300 mg IV 7/7/2021, 8/6/2021, 4/14/2022  -History of B12 deficiency  -MGUS  - COPD  - GERD  -History of lower GI bleed 2017  -Severely decreased bilateral hearing  - Benign paroxysmal positional vertigo  - History of unprovoked left leg deep vein thrombosis (DVT)  3/17/21 and bilateral pulmonary embolism (PE) 9/11/12  - Osteoporosis with compression fracture of spine.  - Shingles right face  2022  - Fall with right hip fracture 1/16/2023  - Subdural hematoma related to fall from standing 1/16/2023-no surgical intervention needed       Interval history:  is here for follow-up of MDS-EB2. She is by herself today.  Her IPSS R score is 8.5, considered very high risk.  After obtaining a second opinion, she decided to go forward with azacitidine.  She received azacitidine 75 mg per metered squared subcu daily 5/22 - 5/26.    He received a platelet transfusion on 5/10/2023 for some dental work.  She received 1 unit packed red blood cells on 5/20/2023.    Nick is feeling fatigued, very thirsty.  She says her appetite is not good.  She says she is not having any nausea.  Overall she feels weak.  No fevers, chills, sweats, cough, dysuria, diarrhea.  She took a ride share to clinic today.  She is wondering about getting some sort of help at home.  She lives alone.  She is wondering if she is going to feel better or if she is just  going to continue feeling this tired.  She is upset that a form I filled out for handicap sticker and give revoking her 's license because I did not feel she was capable of safely driving.  However she admits she does not feel like she would be up to driving.  She forgot her hearing aids this morning, so having a discussion with her is difficult.      PHYSICAL EXAMINATION:  BP (!) 82/55   Pulse 112   Temp 97.5  F (36.4  C) (Oral)   Resp 16   Wt 44.3 kg (97 lb 11.2 oz)   SpO2 100%   BMI 18.46 kg/m      General appearance:  Patient is 77 year oldwoman in no acute distress.  She is sitting in a wheelchair, looking quite frail and fatigued.  HEENT:  No icterus, or mucositis.  .   Lungs:  Clear to auscultation bilaterally.   Heart:  Regular rate and rhythm; no S3 S4 or murmer.     Abdomen:  Positive bowel sounds, soft and nontender, nondistended.    Extremities:  No ankle edema.     Skin:  No rash, no petechiae or ecchymoses.    Labs:     Latest Reference Range & Units 05/19/23 11:12 05/31/23 09:56   WBC 4.0 - 11.0 10e3/uL 3.2 (L) 2.4 (L)   Hemoglobin 11.7 - 15.7 g/dL 7.3 (L) 6.3 (LL)   Hematocrit 35.0 - 47.0 % 22.9 (L) 20.3 (L)   Platelet Count 150 - 450 10e3/uL 15 (LL) 9 (LL)   RBC Count 3.80 - 5.20 10e6/uL 2.51 (L) 2.18 (L)   MCV 78 - 100 fL 91 93   MCH 26.5 - 33.0 pg 29.1 28.9   MCHC 31.5 - 36.5 g/dL 31.9 31.0 (L)   RDW 10.0 - 15.0 % 15.7 (H) 14.7   % Neutrophils % 47 22   % Lymphocytes % 20 50   % Monocytes % 29 24   % Eosinophils % 0 0   % Basophils % 0 1   % Blasts % 4 3   Absolute Basophils 0.0 - 0.2 10e3/uL 0.0 0.0   Absolute Neutrophil 1.6 - 8.3 10e3/uL 1.5 (L) 0.5 (L)   Absolute Lymphocytes 0.8 - 5.3 10e3/uL 0.6 (L) 1.2   Absolute Monocytes 0.0 - 1.3 10e3/uL 0.9 0.6   Absolute Eosinophils 0.0 - 0.7 10e3/uL 0.0 0.0   Absolute Blasts <=0.0 10e3/uL 0.1 (H) 0.1 (H)      [facility] Reference Range & Units 05/19/23 11:12 05/31/23 09:56   Sodium 136 - 145 mmol/L 138 141   Potassium 3.4 - 5.3 mmol/L 4.0 3.6    Chloride 98 - 107 mmol/L 105 108 (H)   Carbon Dioxide (CO2) 22 - 29 mmol/L 21 (L) 25   Urea Nitrogen 8.0 - 23.0 mg/dL 27.3 (H) 39.2 (H)   Creatinine 0.51 - 0.95 mg/dL 1.37 (H) 1.27 (H)   GFR Estimate >60 mL/min/1.73m2 39 (L) 43 (L)   Calcium 8.8 - 10.2 mg/dL 9.0 9.2   Anion Gap 7 - 15 mmol/L 12 8   Albumin 3.5 - 5.2 g/dL 3.1 (L)    Protein Total 6.4 - 8.3 g/dL 7.9    Alkaline Phosphatase 35 - 104 U/L 116 (H)    ALT 10 - 35 U/L 8 (L)    AST 10 - 35 U/L 11    Bilirubin Total <=1.2 mg/dL 1.1    Glucose 70 - 99 mg/dL 124 (H) 119 (H)     Assessment and recommendation:     # MDS-EB 2- IPSS-R score very high risk-this means her median overall survival is 0.8 years and it 25% chance of developing acute leukemia within 0.7 years.  She is now having some symptoms of fatigue with the first cycle of azacitidine.  She is not febrile and not having any symptoms that suggest infection as a cause of the hypotension.  I suspect she is dehydrated from not eating.  She asked me point blank if she was dying.  I discussed with her that this is a serious bone marrow problem and that she will eventually die from the MDS.  The goal of the azacitidine is to try to improve her blood counts and to delay the development of leukemia.  Unfortunately cannot guarantee that that will happen.  I am hoping that she will start to feel better over the next few days and that she will be helped with transfusion and IV fluids.  We can reassess over the next couple of weeks and we can decide whether or not she wants to try another cycle or not.  Is too soon to know if it will make any difference in the course of her disease.  -Transfuse 1 unit of platelets and 2 units packed red cells today if possible  - If cannot get red cells, give 1 L of normal saline and arrange for the red cells sometime within the next couple of days.  - I will likely cut azacitidine dose   -Follow-up in 1 week     #Anemia, thrombocytopenia-this is due to mainly to the MDS.  She  has not responded to Aranesp.     - CBC with possible RBC transfusion every 2 weeks  -Transfuse 1 unit PRBCs for hemoglobin 7.0-8.5, 2 units for hemoglobin less than 7.  Arrange for sometime within the next 5 days.  -transfuse 1 unit platelets for platelet count <10k or serious bleeding.     #Frailty- she is now asking for home care if she needs to move.  I think the reality of her situation is catching up to her.  We will see if the  is available to talk to her today.  If not today within the next few days to address some of these issues.    #Chronic kidney disease- somewhat surprisingly, her creatinine was not worse today than it was a couple of weeks ago.  We will see if it improves with IV fluids.     #Subdural hematoma and subarachnoid hemorrhage after a fall- she had a DVT a couple of years ago. Risk of bleeding while on anticoagulation, with risk of thrombosis.  -No apixaban or other anticoagulant       #Driving-   recommend against driving-hearing, strength not adequate     #CODE STATUS-   a DO NOT RESUSCITATE order was placed in chart 1/11/2023.        [facility] labs and RBC transfusions already scheduled.    RTC 5/31- already scheduled        Time: I spent a total of 45 minutes on the day of the visit. Please see the note for further information on patient assessment and treatment.         Minerva Spann MD  Hematology

## 2023-05-31 NOTE — PROGRESS NOTES
Infusion Nursing Note:  Bonny Raymundo presents today for 1 pk of plts, 2 units of blood.    Patient seen by provider today: Yes: Dr. Spann   present during visit today: Not Applicable.    Note: Pt presents to infusion feeling ok. Pt denies new acute discomfort and states no acute complaints or concerns not addressed by provider today.    TORB. 5/31/2023. 1100. Dr. Spann. Ed Joshi RN. Give 2 units of blood, 1 pk of plts, 1L NS    BP systolic 107 prior to starting fluids.  TORB. 5/31/2023. 1156. Dr. Spann. Ed Joshi RN. Put the fluid on hold. Please page if her systolic BP remains < 100.     Report given to Neda Andrade RN and Emily Meese RN whom accepted the remaining cares of the patient-check vital signs and let go after blood transfusion is complete.     Update 6/1/2023: per IB from Dr. Spann, 6/2 and 6/3 appts can be canceled since pt received plts/blood today. I also have a request for her to have labs and see me in one week.     6/2 and 6/3 appts have been canceled    Intravenous Access:  Peripheral IV placed.    Treatment Conditions:  Lab Results   Component Value Date    HGB 6.3 (LL) 05/31/2023    WBC 2.4 (L) 05/31/2023    ANEU 0.5 (L) 05/31/2023    ANEUTAUTO 1.3 (L) 09/12/2022    PLT 9 (LL) 05/31/2023      Lab Results   Component Value Date     05/31/2023    POTASSIUM 3.6 05/31/2023    MAG 1.8 06/02/2022    CR 1.27 (H) 05/31/2023    LEO 9.2 05/31/2023    BILITOTAL 1.1 05/19/2023    ALBUMIN 3.1 (L) 05/19/2023    ALT 8 (L) 05/19/2023    AST 11 05/19/2023   Blood transfusion consent signed 1/11/23.      Post Infusion Assessment:  Patient tolerated infusion without incident.  Blood return noted pre and post infusion.  Site patent and intact, free from redness, edema or discomfort.  No evidence of extravasations.  Access discontinued per protocol.       Discharge Plan:   Patient declined prescription refills.  Discharge instructions reviewed with: Patient.  Patient and/or family  verbalized understanding of discharge instructions and all questions answered.  AVS to patient via [employer].  Patient will return 6/3 for next appointment.   Patient discharged in stable condition accompanied by: Shantal.  Departure Mode: Wheelchair.  Face to Face time: 10 minutes.      Ed Joshi RN

## 2023-05-31 NOTE — PATIENT INSTRUCTIONS
[facility] Triage and after hours / weekends / holidays:  [phone]    Please call the triage or after hours line if you experience a temperature greater than or equal to 100.4, shaking chills, have uncontrolled nausea, vomiting and/or diarrhea, dizziness, shortness of breath, chest pain, bleeding, unexplained bruising, or if you have any other new/concerning symptoms, questions or concerns.      If you are having any concerning symptoms or wish to speak to a provider before your next infusion visit, please call triage to notify them so we can adequately serve you.     If you need a refill on a narcotic prescription or other medication, please call before your infusion appointment.                 May 2023      [facility] Monday Tuesday Wednesday Thursday Friday Saturday        1     2     3     4     5    LAB PERIPHERAL   1:30 PM   (15 min.)    PIV LAB   [facility] [place] 6    INFUSION 3 HR (180 MIN)   8:00 AM   (180 min.)    CANCER INFUSION NURSE   [facility] [place]   7     8     9    NEW FOOT/ANKLE   4:00 PM   (15 min.)   Garrett Feliciano DPM   [facility] 10    INFUSION 3 HR (180 MIN)   2:00 PM   (180 min.)    CANCER INFUSION NURSE   [facility] [place] 11     12    NEW NON-MALIGNANT ONC   1:15 PM   (30 min.)   Rachel Hernandez MD   [facility] 13       14     15     16     17     18     19    LAB PERIPHERAL  11:00 AM   (15 min.)   SH PIV LAB   [facility] [place] 20    INFUSION 3 HR (180 MIN)   8:00 AM   (180 min.)    CANCER INFUSION NURSE   [facility]   21     22    INFUSION 1 HR (60 MIN)  10:00 AM   (60 min.)    CANCER INFUSION NURSE   [facility] [place] 23    INFUSION 1 HR (60 MIN)   2:00 PM   (60 min.)   SH CANCER INFUSION NURSE   [facility] [place] 24    INFUSION 1 HR (60 MIN)   3:00 PM   (60  min.)    CANCER INFUSION NURSE   [facility] [place] 25    INFUSION 1 HR (60 MIN)  10:30 AM   (60 min.)    CANCER INFUSION NURSE   [facility] [place] 26    INFUSION 1 HR (60 MIN)  11:30 AM   (60 min.)    CANCER INFUSION NURSE   [facility] [place] 27       28     29     30     31    LAB PERIPHERAL   9:30 AM   (15 min.)    [facility] LAB DRAW   [facility]    RETURN CCSL   9:45 AM   (30 min.)   Minerva Spann MD   [facility]    ONC INFUSION 2 HR (120 MIN)  10:30 AM   (120 min.)    ONC INFUSION NURSE   [facility]                              June 2023 Sunday Monday Tuesday Wednesday Thursday Friday Saturday                       1     2    LAB PERIPHERAL  11:15 AM   (15 min.)   SH PIV LAB   [facility] 3    INFUSION 3 HR (180 MIN)   8:30 AM   (180 min.)    CANCER INFUSION NURSE   [facility] [place]   4     5     6     7     8     9     10       11     12     13     14     15     16    LAB PERIPHERAL  11:15 AM   (15 min.)   SH PIV LAB   [facility] [place] 17    INFUSION 3 HR (180 MIN)   8:30 AM   (180 min.)    CANCER INFUSION NURSE   [facility] [place]   18     19     20     21     22     23     24       25     26     27     28     29     30                            Lab Results:  Recent Results (from the past 12 hour(s))   Basic metabolic panel    Collection Time: 05/31/23  9:56 AM   Result Value Ref Range    Sodium 141 136 - 145 mmol/L    Potassium 3.6 3.4 - 5.3 mmol/L    Chloride 108 (H) 98 - 107 mmol/L    Carbon Dioxide (CO2) 25 22 - 29 mmol/L    Anion Gap 8 7 - 15 mmol/L    Urea Nitrogen 39.2 (H) 8.0 - 23.0 mg/dL    Creatinine 1.27 (H) 0.51 - 0.95 mg/dL    Calcium 9.2 8.8 - 10.2 mg/dL    Glucose 119 (H) 70 - 99 mg/dL    GFR Estimate 43 (L) >60 mL/min/1.73m2   CBC with  platelets and differential    Collection Time: 05/31/23  9:56 AM   Result Value Ref Range    WBC Count 2.4 (L) 4.0 - 11.0 10e3/uL    RBC Count 2.18 (L) 3.80 - 5.20 10e6/uL    Hemoglobin 6.3 (LL) 11.7 - 15.7 g/dL    Hematocrit 20.3 (L) 35.0 - 47.0 %    MCV 93 78 - 100 fL    MCH 28.9 26.5 - 33.0 pg    MCHC 31.0 (L) 31.5 - 36.5 g/dL    RDW 14.7 10.0 - 15.0 %    Platelet Count 9 (LL) 150 - 450 10e3/uL   Adult Type and Screen    Collection Time: 05/31/23  9:56 AM   Result Value Ref Range    ABO/RH(D) A POS     Antibody Screen Negative Negative    SPECIMEN EXPIRATION DATE [id number]    Manual Differential    Collection Time: 05/31/23  9:56 AM   Result Value Ref Range    % Neutrophils 22 %    % Lymphocytes 50 %    % Monocytes 24 %    % Eosinophils 0 %    % Basophils 1 %    % Blasts 3 %    Absolute Neutrophils 0.5 (L) 1.6 - 8.3 10e3/uL    Absolute Lymphocytes 1.2 0.8 - 5.3 10e3/uL    Absolute Monocytes 0.6 0.0 - 1.3 10e3/uL    Absolute Eosinophils 0.0 0.0 - 0.7 10e3/uL    Absolute Basophils 0.0 0.0 - 0.2 10e3/uL    Absolute Blasts 0.1 (H) <=0.0 10e3/uL    RBC Morphology Confirmed RBC Indices     Platelet Assessment  Automated Count Confirmed. Platelet morphology is normal.     Automated Count Confirmed. Platelet morphology is normal.    Quang Cells Slight (A) None Seen   Prepare pheresed platelets (unit)    Collection Time: 05/31/23 10:38 AM   Result Value Ref Range    Blood Component Type Platelets     Product Code H4421J93     Unit Status Ready for issue     Unit Number [id number]     CODING SYSTEM VQUA409    Prepare pheresed platelets (unit)    Collection Time: 05/31/23 10:38 AM   Result Value Ref Range    ISSUE DATE AND TIME [id number]     Blood Component Type Platelets     Product Code B5268A74     Unit Status Transfused     Unit Number [id number]     UNIT ABO/RH A+     CODING SYSTEM FEDK154     UNIT TYPE ISBT 6200    Prepare red blood cells (unit)    Collection Time: 05/31/23 11:24 AM   Result Value Ref  Range    Blood Component Type Red Blood Cells     Product Code D9522P43     Unit Status Ready for issue     Unit Number [id number]     CROSSMATCH Compatible     CODING SYSTEM QFWW609    Prepare red blood cells (unit)    Collection Time: 05/31/23 11:24 AM   Result Value Ref Range    Blood Component Type Red Blood Cells     Product Code S0220N05     Unit Status Ready for issue     Unit Number [id number]     CROSSMATCH Compatible     CODING SYSTEM NFUG134

## 2023-05-31 NOTE — PROGRESS NOTES
[facility]: Cancer Care Short Note                                    Discussion with Patient:                                                      Writer present with Dr Spann for a portion of pts visit today.  Pt with low BP, hgb 6.3 and plts 9.  Pt very weak, in w/c today.  Pt post chemo, educated pt that low blood counts and some weakness is expected but with pt elderly and living alone, concerns for her safety.  Pt also with letter from [facility] stating that her license will be revoked.  Pt upset with Dr Spann that she initiated this.  Pt feels that she would benefit from [facility], educated that pt would need to be homebound.  Pt very [place] and did not bring hearing aides in today.   Writer spoke with Alissa in , consult entered per Dr Spann.  Pt did arrive via uber today,  will assist with getting ride home later this afternoon.        Assessment:                                                      Assessment completed with:: Patient    Constitutional  Fatigue: Fatigue not relieved by rest OR limiting instrumental ADL  Fever: Absent or within normal limits    Respiratory  Cough: Absent or within normal limits  Dyspnea: Shortness of breath with moderate exertion    Gastrointestinal  Anorexia: Loss of appetite without alteration in eating habits  Nausea: Absent or within normal limits  Vomiting: Absent or within normal limits  Dehydration: Absent or within normal limits  Mucositis Oral: Absent or within normal limits    Genitourinary       Integumentary  Rash Maculo-Papular: Absent or within normal limits    Pain  Patient Reported Pain?: No  Pain Score: No Pain (0)    Patient Coping       Clinic Utilization  Patient Aware of Next Appointment?: Yes    Other Patient Concerns  Other Patient Reported Concerns: Yes, see notes    No assessment indicated    Intervention/Education provided during outreach:                                                       Infusion able to add on pt today for IVF, blood and plts.  Pt  brought over to infusion by judson Moeller RN.    Patient to follow up as scheduled at next appt  Patient to call/Premiset message with updates  Confirmed patient has clinic and triage numbers    ADAM Dominique, RN  RN Care Coordinator  [facility]

## 2023-05-31 NOTE — PROGRESS NOTES
Hematology clinic visit  In person visit     Problem list:  - Myelodysplastic syndrome with excess blasts-2 (MDS-EB 2).  Bone marrow biopsy 12/15/2022.   IPSS-R score 8.5-very high - based on cytogenetics -7, > 10% blasts, hemoglobin less than 8, platelet , ANC less than 0.8     Final Diagnosis   A.  Bone Marrow Biopsy from left posterior iliac crest and peripheral smear morphology:  - Myelodysplastic syndrome with excess blasts 2 (MDS-EB2) showing:       - Increased marrow blasts (13.3% on imprint smear differential, 12% on flow cytometry).       - Mildly hypercellular marrow for age , 30-35% .      - Moderately increased marrow fibrosis (MF-2).      - No appreciable dysplasia.      - Increased marrow storage iron with 84% sideroblasts, 6% ringed sideroblasts      - Peripheral blood pancytopenia showing:            - Moderate macrocytic, normochromic anemia without evidence of hemolysis or increased red cell regeneration, rare circulating nucleated red blood cell.           - Moderate leukopenia with rare circulating blasts without Bernice rods.           - Moderate to marked thrombocytopenia.   Electronically signed by Rick Bangura MD on 12/19/2022    Cytogenetics-46,XX,-7,+21[20]  Detected Alterations of Known or Potential Pathogenicity: RUNX1 G199E   Detected Alterations of Uncertain Significance: None   Genes with No Detected Clinically Significant Alterations: ABL1, ALK, ASXL1, ATRX, BCOR, CALR, CBL, CEBPA, CSF3R, DNMT3A, ETV6, EZH2, FLT3, GATA1, GATA2, HRAS, IDH1, IDH2, JAK1, JAK2, JAK3, KIT, KMT2A, KRAS, MPL, NF1, NPM1, NRAS, PDGFRA, PDGFRB, PHF6, PTPN11, JAMA, SETBP1, SF1, SF3A1, SF3B1, SH2B3, SRSF2, TET2, TP53, U2AF2, WT1, ZRSR2     She was initially diagnosed as aplastic anemia-diagnosed 9/26/2016, repeat bone marrow biopsy 12/22/16- bone marrow <5% cellular with no dysplasia.  Normal cytogenetics. PNH negative. 1/9/ 2017-treated with ATG, methylprednisolone, cyclosporine, and eltrombopag.   Cyclosporine discontinued May 2021, eltrombopag discontinued June 2021 due to abnormal liver function test.    Partial remission with the immunotherapy, which is gradually weaned.  Now transfusion dependent  - Chronic kidney disease stage IV  - Atrial fibrillation-on apixaban chronically  -History of iron deficiency anemia- Venofer 300 mg IV 7/7/2021, 8/6/2021, 4/14/2022  -History of B12 deficiency  -MGUS  - COPD  - GERD  -History of lower GI bleed 2017  -Severely decreased bilateral hearing  - Benign paroxysmal positional vertigo  - History of unprovoked left leg deep vein thrombosis (DVT)  3/17/21 and bilateral pulmonary embolism (PE) 9/11/12  - Osteoporosis with compression fracture of spine.  - Shingles right face  2022  - Fall with right hip fracture 1/16/2023  - Subdural hematoma related to fall from standing 1/16/2023-no surgical intervention needed       Interval history:  is here for follow-up of MDS-EB2. She is by herself today.  Her IPSS R score is 8.5, considered very high risk.  After obtaining a second opinion, she decided to go forward with azacitidine.  She received azacitidine 75 mg per metered squared subcu daily 5/22 - 5/26.    He received a platelet transfusion on 5/10/2023 for some dental work.  She received 1 unit packed red blood cells on 5/20/2023.    Nick is feeling fatigued, very thirsty.  She says her appetite is not good.  She says she is not having any nausea.  Overall she feels weak.  No fevers, chills, sweats, cough, dysuria, diarrhea.  She took a ride share to clinic today.  She is wondering about getting some sort of help at home.  She lives alone.  She is wondering if she is going to feel better or if she is just going to continue feeling this tired.  She is upset that a form I filled out for handicap sticker and give revoking her 's license because I did not feel she was capable of safely driving.  However she admits she does not feel like she would be up to  driving.  She forgot her hearing aids this morning, so having a discussion with her is difficult.      PHYSICAL EXAMINATION:  BP (!) 82/55   Pulse 112   Temp 97.5  F (36.4  C) (Oral)   Resp 16   Wt 44.3 kg (97 lb 11.2 oz)   SpO2 100%   BMI 18.46 kg/m      General appearance:  Patient is 77 year oldwoman in no acute distress.  She is sitting in a wheelchair, looking quite frail and fatigued.  HEENT:  No icterus, or mucositis.  .   Lungs:  Clear to auscultation bilaterally.   Heart:  Regular rate and rhythm; no S3 S4 or murmer.     Abdomen:  Positive bowel sounds, soft and nontender, nondistended.    Extremities:  No ankle edema.     Skin:  No rash, no petechiae or ecchymoses.    Labs:     Latest Reference Range & Units 05/19/23 11:12 05/31/23 09:56   WBC 4.0 - 11.0 10e3/uL 3.2 (L) 2.4 (L)   Hemoglobin 11.7 - 15.7 g/dL 7.3 (L) 6.3 (LL)   Hematocrit 35.0 - 47.0 % 22.9 (L) 20.3 (L)   Platelet Count 150 - 450 10e3/uL 15 (LL) 9 (LL)   RBC Count 3.80 - 5.20 10e6/uL 2.51 (L) 2.18 (L)   MCV 78 - 100 fL 91 93   MCH 26.5 - 33.0 pg 29.1 28.9   MCHC 31.5 - 36.5 g/dL 31.9 31.0 (L)   RDW 10.0 - 15.0 % 15.7 (H) 14.7   % Neutrophils % 47 22   % Lymphocytes % 20 50   % Monocytes % 29 24   % Eosinophils % 0 0   % Basophils % 0 1   % Blasts % 4 3   Absolute Basophils 0.0 - 0.2 10e3/uL 0.0 0.0   Absolute Neutrophil 1.6 - 8.3 10e3/uL 1.5 (L) 0.5 (L)   Absolute Lymphocytes 0.8 - 5.3 10e3/uL 0.6 (L) 1.2   Absolute Monocytes 0.0 - 1.3 10e3/uL 0.9 0.6   Absolute Eosinophils 0.0 - 0.7 10e3/uL 0.0 0.0   Absolute Blasts <=0.0 10e3/uL 0.1 (H) 0.1 (H)      [facility] Reference Range & Units 05/19/23 11:12 05/31/23 09:56   Sodium 136 - 145 mmol/L 138 141   Potassium 3.4 - 5.3 mmol/L 4.0 3.6   Chloride 98 - 107 mmol/L 105 108 (H)   Carbon Dioxide (CO2) 22 - 29 mmol/L 21 (L) 25   Urea Nitrogen 8.0 - 23.0 mg/dL 27.3 (H) 39.2 (H)   Creatinine 0.51 - 0.95 mg/dL 1.37 (H) 1.27 (H)   GFR Estimate >60 mL/min/1.73m2 39 (L) 43 (L)   Calcium 8.8 - 10.2  mg/dL 9.0 9.2   Anion Gap 7 - 15 mmol/L 12 8   Albumin 3.5 - 5.2 g/dL 3.1 (L)    Protein Total 6.4 - 8.3 g/dL 7.9    Alkaline Phosphatase 35 - 104 U/L 116 (H)    ALT 10 - 35 U/L 8 (L)    AST 10 - 35 U/L 11    Bilirubin Total <=1.2 mg/dL 1.1    Glucose 70 - 99 mg/dL 124 (H) 119 (H)     Assessment and recommendation:     # MDS-EB 2- IPSS-R score very high risk-this means her median overall survival is 0.8 years and it 25% chance of developing acute leukemia within 0.7 years.  She is now having some symptoms of fatigue with the first cycle of azacitidine.  She is not febrile and not having any symptoms that suggest infection as a cause of the hypotension.  I suspect she is dehydrated from not eating.  She asked me point blank if she was dying.  I discussed with her that this is a serious bone marrow problem and that she will eventually die from the MDS.  The goal of the azacitidine is to try to improve her blood counts and to delay the development of leukemia.  Unfortunately cannot guarantee that that will happen.  I am hoping that she will start to feel better over the next few days and that she will be helped with transfusion and IV fluids.  We can reassess over the next couple of weeks and we can decide whether or not she wants to try another cycle or not.  Is too soon to know if it will make any difference in the course of her disease.  -Transfuse 1 unit of platelets and 2 units packed red cells today if possible  - If cannot get red cells, give 1 L of normal saline and arrange for the red cells sometime within the next couple of days.  - I will likely cut azacitidine dose   -Follow-up in 1 week     #Anemia, thrombocytopenia-this is due to mainly to the MDS.  She has not responded to Aranesp.     - CBC with possible RBC transfusion every 2 weeks  -Transfuse 1 unit PRBCs for hemoglobin 7.0-8.5, 2 units for hemoglobin less than 7.  Arrange for sometime within the next 5 days.  -transfuse 1 unit platelets for platelet  count <10k or serious bleeding.     #Frailty- she is now asking for home care if she needs to move.  I think the reality of her situation is catching up to her.  We will see if the  is available to talk to her today.  If not today within the next few days to address some of these issues.    #Chronic kidney disease- somewhat surprisingly, her creatinine was not worse today than it was a couple of weeks ago.  We will see if it improves with IV fluids.     #Subdural hematoma and subarachnoid hemorrhage after a fall- she had a DVT a couple of years ago. Risk of bleeding while on anticoagulation, with risk of thrombosis.  -No apixaban or other anticoagulant       #Driving-   recommend against driving-hearing, strength not adequate     #CODE STATUS-   a DO NOT RESUSCITATE order was placed in chart 1/11/2023.        [facility] labs and RBC transfusions already scheduled.    RTC 5/31- already scheduled        Time: I spent a total of 45 minutes on the day of the visit. Please see the note for further information on patient assessment and treatment.         Minerva Spann MD  Hematology

## 2023-05-31 NOTE — NURSING NOTE
Chief Complaint   Patient presents with     Oncology Clinic Visit     Idiopathic aplastic anemia      Labs Only     Labs drawn via piv by VAT in lab, vitals completed     Pt arrived to lab via wheelchair, states she has been feeling very weak since chemo last week. VS obtained, pt found to be hypotensive 80s/50s. Dr. Spann was notified, PIV was placed by VAT. Labs collected. Pt was brought to clinic by this RN and clinic staff made aware.    Ana Maria Durand RN

## 2023-06-01 NOTE — PROGRESS NOTES
[facility]: Cancer Care                                                                                        Left message on both pt's home and cell phone.  She does not need blood this weekend since we gave it to her yesterday.  She does need a follow up visit with Dr Spann next week.      Her son Shakeel reached out to writer and is questioning why Dr Spann made the recommendation for Bonny not driving anymore.  Bonny is very [place] and weak, but Shakeel would like to speak with Dr Spann about this.  Message sent to MD with Shakeel's phone number.      Signature:  Sue Marquez RN

## 2023-06-01 NOTE — TELEPHONE ENCOUNTER
I discussed with Bonny's son Shakeel about her health condition yesterday and my assessment that she was not capable of driving anymore. I apologized on my lack of communication regarding the driving. I emphasized that with her weakness, poor reaction time and transfusion dependence that it is not safe for her or for others for her to drive. This was true even before starting chemotherapy. I indicated that I have talked with him in the past about the seriousness of her bone marrow problem and that the low blood counts might temporarily improve with chemotherapy, needing less frequent blood transfusion. But eventually, she will die of the bone marrow problem. She was very weak yesterday and had a systolic BP of only 85, which improved with RBC and platelet transfusions, suggesting dehydration.I am hopeful that was her low point and that she will start feeling better. Regardless of whether she has an active drivers license or not, she will need help making it to appointments and activities of daily living. I acknowledged how difficult this is for him and other family members to help her with such things while also working and taking care of their own families. He asked that I call him to make all major decision. I indicated that as long as she is decisional, she needs to be part of the discussion. Not being physically capable of driving does not mean she is incapable of making decisions. One solution is to include him by speaker phone during appointments if he is unable to make it to appointments in person. He is agreeable to this plan.   Minerva Spann MD  Hematology

## 2023-06-07 NOTE — NURSING NOTE
"Oncology Rooming Note    June 7, 2023 10:51 AM   Bonny Raymundo is a 77 year old female who presents for:    Chief Complaint   Patient presents with     Blood Draw     Labs drawn by emma in lab by RN. VS taken.     Oncology Clinic Visit     APLASTIC ANEMIA        Initial Vitals: /69   Pulse 109   Temp 97.9  F (36.6  C) (Oral)   Resp 16   Wt 45.1 kg (99 lb 6.4 oz)   SpO2 97%   BMI 18.78 kg/m   Estimated body mass index is 18.78 kg/m  as calculated from the following:    Height as of 5/12/23: 1.549 m (5' 1\").    Weight as of this encounter: 45.1 kg (99 lb 6.4 oz). Body surface area is 1.39 meters squared.  No Pain (0) Comment: Data Unavailable   No LMP recorded. Patient has had a hysterectomy.  Allergies reviewed: Yes  Medications reviewed: Yes    Medications: Medication refills not needed today.  Pharmacy name entered into [facility]:    [facility] [place], [place] - [address], [address]  [facility] - [place], [place] - [address]  [id number] [place], [place] - [address]    Clinical concerns: None.       Amy Abreu CMA            "

## 2023-06-07 NOTE — NURSING NOTE
Chief Complaint   Patient presents with     Blood Draw     Labs drawn by  in lab by RN. VS taken.     Labs collected from venipuncture by RN. Vitals taken. Checked in for appointment(s).  Terrance Garibay RN

## 2023-06-07 NOTE — LETTER
6/7/2023         RE: Bonny Raymundo  [address]  [place] [facility] [address]        Dear Colleague,    Thank you for referring your patient, Bonny Raymundo, to the [facility]. Please see a copy of my visit note below.    Hematology clinic visit  In person visit     Problem list:  - Myelodysplastic syndrome with excess blasts-2 (MDS-EB 2).  Bone marrow biopsy 12/15/2022.   IPSS-R score 8.5-very high - based on cytogenetics -7, > 10% blasts, hemoglobin less than 8, platelet , ANC less than 0.8     Final Diagnosis   A.  Bone Marrow Biopsy from left posterior iliac crest and peripheral smear morphology:  - Myelodysplastic syndrome with excess blasts 2 (MDS-EB2) showing:       - Increased marrow blasts (13.3% on imprint smear differential, 12% on flow cytometry).       - Mildly hypercellular marrow for age , 30-35% .      - Moderately increased marrow fibrosis (MF-2).      - No appreciable dysplasia.      - Increased marrow storage iron with 84% sideroblasts, 6% ringed sideroblasts      - Peripheral blood pancytopenia showing:            - Moderate macrocytic, normochromic anemia without evidence of hemolysis or increased red cell regeneration, rare circulating nucleated red blood cell.           - Moderate leukopenia with rare circulating blasts without Bernice rods.           - Moderate to marked thrombocytopenia.   Electronically signed by Rick Bangura MD on 12/19/2022    Cytogenetics-46,XX,-7,+21[20]  Detected Alterations of Known or Potential Pathogenicity: RUNX1 G199E   Detected Alterations of Uncertain Significance: None   Genes with No Detected Clinically Significant Alterations: ABL1, ALK, ASXL1, ATRX, BCOR, CALR, CBL, CEBPA, CSF3R, DNMT3A, ETV6, EZH2, FLT3, GATA1, GATA2, HRAS, IDH1, IDH2, JAK1, JAK2, JAK3, KIT, KMT2A, KRAS, MPL, NF1, NPM1, NRAS, PDGFRA, PDGFRB, PHF6, PTPN11, JAMA, SETBP1, SF1, SF3A1, SF3B1, SH2B3, SRSF2, TET2, TP53, U2AF2, WT1, ZRSR2     She  was initially diagnosed as aplastic anemia-diagnosed 9/26/2016, repeat bone marrow biopsy 12/22/16- bone marrow <5% cellular with no dysplasia.  Normal cytogenetics. PNH negative. 1/9/ 2017-treated with ATG, methylprednisolone, cyclosporine, and eltrombopag.  Cyclosporine discontinued May 2021, eltrombopag discontinued June 2021 due to abnormal liver function test.    Partial remission with the immunotherapy, which is gradually weaned.  Now transfusion dependent  - Chronic kidney disease stage IV  - Atrial fibrillation-on apixaban chronically  -History of iron deficiency anemia- Venofer 300 mg IV 7/7/2021, 8/6/2021, 4/14/2022  -History of B12 deficiency  -MGUS  - COPD  - GERD  -History of lower GI bleed 2017  -Severely decreased bilateral hearing  - Benign paroxysmal positional vertigo  - History of unprovoked left leg deep vein thrombosis (DVT)  3/17/21 and bilateral pulmonary embolism (PE) 9/11/12  - Osteoporosis with compression fracture of spine.  - Shingles right face  2022  - Fall with right hip fracture 1/16/2023  - Subdural hematoma related to fall from standing 1/16/2023-no surgical intervention needed      Interval history:  is here for follow-up of MDS-EB2.  I saw her on 5/31/2023.  She is by herself today.  Her son Shakeel joined by speaker phone today toward the end of the visit.  Her IPSS R score is 8.5, considered very high risk.  After obtaining a second opinion, she decided to go forward with azacitidine.  She received azacitidine 75 mg per metered squared subcu daily 5/22 - 5/26.  She was feeling weak and was hypotensive when I last saw her.  She received 2 units of packed red cells and 1 unit of platelets with improvement of her blood pressure.    She is feeling a lot better today.  Her energy is better and not lightheaded.  No bleeding symptoms-no epistaxis, melena, hematochezia, large bruises.  She is still not cooking much.  She has someone who comes in to clean her house.  She still  does her laundry about every 2 weeks, washer and dryer in the basement.      PHYSICAL EXAMINATION:  /69   Pulse 109   Temp 97.9  F (36.6  C) (Oral)   Resp 16   Wt 45.1 kg (99 lb 6.4 oz)   SpO2 97%   BMI 18.78 kg/m      General appearance:  Patient is  77 year old woman in no acute distress.  Frail, but able to climb up onto exam table.  Not in wheelchair today.  HEENT:  No pallor, icterus, or mucositis.      Lungs:  Clear to auscultation bilaterally.   Heart:  Regular rate and rhythm; no S3 S4 or murmer.     Abdomen:  Positive bowel sounds, soft and nontender, nondistended.  No hepatomegaly. No splenomegaly appreciated.    Extremities:  No ankle edema.     Skin: Very thin skin on forearms, with scattered small senile purpura. no rash, no petechiae or other ecchymoses.    Labs:   Latest Reference Range & Units 05/05/23 13:48 05/19/23 11:12 05/31/23 09:56 06/07/23 10:45   WBC 4.0 - 11.0 10e3/uL 2.4 (L) 3.2 (L) 2.4 (L) 3.0 (L)   Hemoglobin 11.7 - 15.7 g/dL 6.5 (LL) 7.3 (L) 6.3 (LL) 7.7 (L)   Hematocrit 35.0 - 47.0 % 20.7 (L) 22.9 (L) 20.3 (L) 24.3 (L)   Platelet Count 150 - 450 10e3/uL 18 (LL) 15 (LL) 9 (LL) 8 (LL)   RBC Count 3.80 - 5.20 10e6/uL 2.25 (L) 2.51 (L) 2.18 (L) 2.64 (L)   MCV 78 - 100 fL 92 91 93 92   MCH 26.5 - 33.0 pg 28.9 29.1 28.9 29.2   MCHC 31.5 - 36.5 g/dL 31.4 (L) 31.9 31.0 (L) 31.7   RDW 10.0 - 15.0 % 16.0 (H) 15.7 (H) 14.7 14.5   % Neutrophils % 43 47 22 33   % Lymphocytes % 36 20 50 39   % Monocytes % 16 29 24 28   % Eosinophils % 0 0 0 0   % Basophils % 0 0 1 0   % Blasts % 5 4 3    Absolute Basophils 0.0 - 0.2 10e3/uL 0.0 0.0 0.0 0.0   Absolute Neutrophil 1.6 - 8.3 10e3/uL 1.0 (L) 1.5 (L) 0.5 (L) 1.0 (L)   Absolute Lymphocytes 0.8 - 5.3 10e3/uL 0.9 0.6 (L) 1.2 1.2   Absolute Monocytes 0.0 - 1.3 10e3/uL 0.4 0.9 0.6 0.8   Absolute Eosinophils 0.0 - 0.7 10e3/uL 0.0 0.0 0.0 0.0   Absolute Blasts <=0.0 10e3/uL 0.1 (H) 0.1 (H) 0.1 (H)        Assessment and recommendation:     # MDS-EB 2-  IPSS-R score very high risk-this means her median overall survival is 0.8 years and it 25% chance of developing acute leukemia within 0.7 years.  I discussed with her that I am hopeful that the next time she receives the azacitidine, she will not feel as fatigued afterwards.  I will decrease the dose of azacitidine. It is too soon to know if it will make any difference in the course of her disease.  I will try to set up more routine appointments for transfusions in anticipation that she will need them.  -Transfuse 1 unit of platelets within next few days  - Keep appointment 6/16/2023 for labs and transfusion of red cells and probably platelets on 6/17/2023  -Start next cycle of azacitidine 6/19 - 6/25/2023  - Decrease azacitidine dose by 25%  -Appointment with me on 6/29/2023 with labs and transfusion appointment       #Anemia, thrombocytopenia-this is due to mainly to the MDS.  She has not responded to Aranesp.     - CBC with possible RBC transfusion every 2 weeks  -Transfuse 1 unit PRBCs for hemoglobin 7.0-8 if symptomatic, 2 units for hemoglobin < 7.  Arrange for sometime within the next 5 days.  -transfuse 1 unit platelets for platelet count <10k or serious bleeding.     #Frailty- attempts for home care evaluation have been delayed, Sue dang RN will follow-up..     #Chronic kidney disease- no significant change     #Subdural hematoma and subarachnoid hemorrhage after a fall- she had a DVT a couple of years ago. Risk of bleeding while on anticoagulation outweighs risk of thrombosis in view of significant chronic thrombocytopenia.  -No apixaban or other anticoagulant     #Driving-   recommend against driving-hearing, strength, reaction time not adequate     #CODE STATUS-   a DO NOT RESUSCITATE order was placed in chart 1/11/2023.        Time: I spent a total of 60 minutes on the day of the visit. Please see the note for further information on patient assessment and treatment.         Minerva Spann,  MD  Hematology

## 2023-06-07 NOTE — PROGRESS NOTES
Hematology clinic visit  In person visit     Problem list:  - Myelodysplastic syndrome with excess blasts-2 (MDS-EB 2).  Bone marrow biopsy 12/15/2022.   IPSS-R score 8.5-very high - based on cytogenetics -7, > 10% blasts, hemoglobin less than 8, platelet , ANC less than 0.8     Final Diagnosis   A.  Bone Marrow Biopsy from left posterior iliac crest and peripheral smear morphology:  - Myelodysplastic syndrome with excess blasts 2 (MDS-EB2) showing:       - Increased marrow blasts (13.3% on imprint smear differential, 12% on flow cytometry).       - Mildly hypercellular marrow for age , 30-35% .      - Moderately increased marrow fibrosis (MF-2).      - No appreciable dysplasia.      - Increased marrow storage iron with 84% sideroblasts, 6% ringed sideroblasts      - Peripheral blood pancytopenia showing:            - Moderate macrocytic, normochromic anemia without evidence of hemolysis or increased red cell regeneration, rare circulating nucleated red blood cell.           - Moderate leukopenia with rare circulating blasts without Bernice rods.           - Moderate to marked thrombocytopenia.   Electronically signed by Rick Bangura MD on 12/19/2022    Cytogenetics-46,XX,-7,+21[20]  Detected Alterations of Known or Potential Pathogenicity: RUNX1 G199E   Detected Alterations of Uncertain Significance: None   Genes with No Detected Clinically Significant Alterations: ABL1, ALK, ASXL1, ATRX, BCOR, CALR, CBL, CEBPA, CSF3R, DNMT3A, ETV6, EZH2, FLT3, GATA1, GATA2, HRAS, IDH1, IDH2, JAK1, JAK2, JAK3, KIT, KMT2A, KRAS, MPL, NF1, NPM1, NRAS, PDGFRA, PDGFRB, PHF6, PTPN11, JAMA, SETBP1, SF1, SF3A1, SF3B1, SH2B3, SRSF2, TET2, TP53, U2AF2, WT1, ZRSR2     She was initially diagnosed as aplastic anemia-diagnosed 9/26/2016, repeat bone marrow biopsy 12/22/16- bone marrow <5% cellular with no dysplasia.  Normal cytogenetics. PNH negative. 1/9/ 2017-treated with ATG, methylprednisolone, cyclosporine, and eltrombopag.   Cyclosporine discontinued May 2021, eltrombopag discontinued June 2021 due to abnormal liver function test.    Partial remission with the immunotherapy, which is gradually weaned.  Now transfusion dependent  - Chronic kidney disease stage IV  - Atrial fibrillation-on apixaban chronically  -History of iron deficiency anemia- Venofer 300 mg IV 7/7/2021, 8/6/2021, 4/14/2022  -History of B12 deficiency  -MGUS  - COPD  - GERD  -History of lower GI bleed 2017  -Severely decreased bilateral hearing  - Benign paroxysmal positional vertigo  - History of unprovoked left leg deep vein thrombosis (DVT)  3/17/21 and bilateral pulmonary embolism (PE) 9/11/12  - Osteoporosis with compression fracture of spine.  - Shingles right face  2022  - Fall with right hip fracture 1/16/2023  - Subdural hematoma related to fall from standing 1/16/2023-no surgical intervention needed      Interval history:  is here for follow-up of MDS-EB2.  I saw her on 5/31/2023.  She is by herself today.  Her son Shakeel joined by speaker phone today toward the end of the visit.  Her IPSS R score is 8.5, considered very high risk.  After obtaining a second opinion, she decided to go forward with azacitidine.  She received azacitidine 75 mg per metered squared subcu daily 5/22 - 5/26.  She was feeling weak and was hypotensive when I last saw her.  She received 2 units of packed red cells and 1 unit of platelets with improvement of her blood pressure.    She is feeling a lot better today.  Her energy is better and not lightheaded.  No bleeding symptoms-no epistaxis, melena, hematochezia, large bruises.  She is still not cooking much.  She has someone who comes in to clean her house.  She still does her laundry about every 2 weeks, washer and dryer in the basement.      PHYSICAL EXAMINATION:  /69   Pulse 109   Temp 97.9  F (36.6  C) (Oral)   Resp 16   Wt 45.1 kg (99 lb 6.4 oz)   SpO2 97%   BMI 18.78 kg/m      General appearance:  Patient is   77 year old woman in no acute distress.  Frail, but able to climb up onto exam table.  Not in wheelchair today.  HEENT:  No pallor, icterus, or mucositis.      Lungs:  Clear to auscultation bilaterally.   Heart:  Regular rate and rhythm; no S3 S4 or murmer.     Abdomen:  Positive bowel sounds, soft and nontender, nondistended.  No hepatomegaly. No splenomegaly appreciated.    Extremities:  No ankle edema.     Skin: Very thin skin on forearms, with scattered small senile purpura. no rash, no petechiae or other ecchymoses.    Labs:   Latest Reference Range & Units 05/05/23 13:48 05/19/23 11:12 05/31/23 09:56 06/07/23 10:45   WBC 4.0 - 11.0 10e3/uL 2.4 (L) 3.2 (L) 2.4 (L) 3.0 (L)   Hemoglobin 11.7 - 15.7 g/dL 6.5 (LL) 7.3 (L) 6.3 (LL) 7.7 (L)   Hematocrit 35.0 - 47.0 % 20.7 (L) 22.9 (L) 20.3 (L) 24.3 (L)   Platelet Count 150 - 450 10e3/uL 18 (LL) 15 (LL) 9 (LL) 8 (LL)   RBC Count 3.80 - 5.20 10e6/uL 2.25 (L) 2.51 (L) 2.18 (L) 2.64 (L)   MCV 78 - 100 fL 92 91 93 92   MCH 26.5 - 33.0 pg 28.9 29.1 28.9 29.2   MCHC 31.5 - 36.5 g/dL 31.4 (L) 31.9 31.0 (L) 31.7   RDW 10.0 - 15.0 % 16.0 (H) 15.7 (H) 14.7 14.5   % Neutrophils % 43 47 22 33   % Lymphocytes % 36 20 50 39   % Monocytes % 16 29 24 28   % Eosinophils % 0 0 0 0   % Basophils % 0 0 1 0   % Blasts % 5 4 3    Absolute Basophils 0.0 - 0.2 10e3/uL 0.0 0.0 0.0 0.0   Absolute Neutrophil 1.6 - 8.3 10e3/uL 1.0 (L) 1.5 (L) 0.5 (L) 1.0 (L)   Absolute Lymphocytes 0.8 - 5.3 10e3/uL 0.9 0.6 (L) 1.2 1.2   Absolute Monocytes 0.0 - 1.3 10e3/uL 0.4 0.9 0.6 0.8   Absolute Eosinophils 0.0 - 0.7 10e3/uL 0.0 0.0 0.0 0.0   Absolute Blasts <=0.0 10e3/uL 0.1 (H) 0.1 (H) 0.1 (H)        Assessment and recommendation:     # MDS-EB 2- IPSS-R score very high risk-this means her median overall survival is 0.8 years and it 25% chance of developing acute leukemia within 0.7 years.  I discussed with her that I am hopeful that the next time she receives the azacitidine, she will not feel as fatigued  afterwards.  I will decrease the dose of azacitidine. It is too soon to know if it will make any difference in the course of her disease.  I will try to set up more routine appointments for transfusions in anticipation that she will need them.  -Transfuse 1 unit of platelets within next few days  - Keep appointment 6/16/2023 for labs and transfusion of red cells and probably platelets on 6/17/2023  -Start next cycle of azacitidine 6/19 - 6/25/2023  - Decrease azacitidine dose by 25%  -Appointment with me on 6/29/2023 with labs and transfusion appointment       #Anemia, thrombocytopenia-this is due to mainly to the MDS.  She has not responded to Aranesp.     - CBC with possible RBC transfusion every 2 weeks  -Transfuse 1 unit PRBCs for hemoglobin 7.0-8 if symptomatic, 2 units for hemoglobin < 7.  Arrange for sometime within the next 5 days.  -transfuse 1 unit platelets for platelet count <10k or serious bleeding.     #Frailty- attempts for home care evaluation have been delayed, Sue dang RN will follow-up..     #Chronic kidney disease- no significant change     #Subdural hematoma and subarachnoid hemorrhage after a fall- she had a DVT a couple of years ago. Risk of bleeding while on anticoagulation outweighs risk of thrombosis in view of significant chronic thrombocytopenia.  -No apixaban or other anticoagulant     #Driving-   recommend against driving-hearing, strength, reaction time not adequate     #CODE STATUS-   a DO NOT RESUSCITATE order was placed in chart 1/11/2023.        Time: I spent a total of 60 minutes on the day of the visit. Please see the note for further information on patient assessment and treatment.         Minerva Spann MD  Hematology

## 2023-06-08 NOTE — PROGRESS NOTES
[facility]: Cancer Care                                                                                          Called [facility] to follow-up if they are able to take pt.  Writer had faxed referral on 6/2 and 6/5, but they are saying they have not received it.  Verified fax number and refaxed.       They also send they can take an Epic in basket message.  Message sent.           Signature:  Sue Marquez RN

## 2023-06-09 NOTE — PROGRESS NOTES
Social Work - Intervention  [facility]  Data/Intervention:    Patient Name: Bonny Raymundo  /Age: 1945 (77 year old)     Visit Type: in person  Referral Source: care team  Reason for Referral: in home supports/care options    Collaborated With:    -patient  -     Psychosocial Information/Concerns:  Dr. Spann sent a letter to [facility] stating patient is no longer safe to drive, drivers licence will  around   Patient currently lives alone in their own home which they own  Patient concerned about managing health needs following treatment. Patient requested home health care services.   Patient exploring supportive living options such as assisted living or senior living facilities.        Intervention/Education/Resources Provided:  Care team placed home health care referral   Education about insurance coverage of home health care services  List of private duty health care agencies provided to patient     Assessment/Plan:  Patient discussed how they were coping with their treatment. Patient states they are feeling better. However they were feeling very fatigued following their first treatment and are wondering if that will continue through out their treatment. Patient stated they would like someone to come to their home to check on them to make sure they have eaten, taken their medications assist with meals on days they feel to tired to prepare them and assist with housing keeping as needed.Patient states they have a son, but do not want to bother them with their care needs and support needs. Patient asked about home health care services or PCA services. SW explained the difference between the two. SW discussed the qualifications of being homebund in order for medicare to cover home health services. Patient is hard of hearing and forgot to bring their hearing aids. Patient had a difficult time understanding. Patient wondered about their options if medicare did not cover the cost for home  health care services. SW discussed private pay options and out of pocket cost of home health care services.     Patient discussed their consideration of assisted living and senior living facilities. Patient stated they were wondering about when they should possibly move into this type of living facility. Patient discussed their ability to continue to live alone and manage the upkeep of their home and their health needs. SW provided supportive listening and brief supportive counseling. Patient states they have assisted living facility picked out that they would go to when they are ready to move. Patient states they have toured it and reviewed the cost of living there.     Patient states they currently own their home and have been saving to pay the reminder of the balance of their mortagae as they would like to give it to their son.      Patient discussed they have a long term care insurance and wondered what this might help with. SW encouraged patient to follow up with their long term care insurance regarding their coverage.     Patient decided next steps would be for them to call their long term care insurance about their coverage. Call some of the home health care agencies to check on cost and availability.     Patient stated they would also see how they are feeling as they continue to go through treatment. Patient sated they would like to be moved into the assisted living facility before this coming winter. Patient stated they would also call and talk with admissions at the assisted living facility they are considering moving into.     Provided patient/family with contact information and availability.    Alissa FIGUEROA, [facility]  - Oncology  Phone : [phone]  Pager: [phone]

## 2023-06-09 NOTE — TELEPHONE ENCOUNTER
Home Care is calling regarding an established patient with [facility].       Requesting orders from: Shelli Cash  Provider is following patient: Yes  Is this a 60-day recertification request?  No    Orders Requested    Skilled Nursing  Request for delay in care, service is not able to be provided within same scheduled day.   6/12/23    Confirmed ok to leave a detailed message with call back.  Contact information confirmed and updated as needed.    Anahi Hidalgo RN

## 2023-06-09 NOTE — TELEPHONE ENCOUNTER
Called back and gave verbal on requested HC orders    Lana BALTAZAR, Triage RN  [facility]

## 2023-06-10 NOTE — PROGRESS NOTES
Infusion Nursing Note:  Bonny Raymundo presents today for 1 unit PRBCs, 1 unit platelets.    Patient seen by provider today: No   present during visit today: Not Applicable.    Note: Blood consent signed 1/11/23.      Intravenous Access:  Peripheral IV placed.    Treatment Conditions:  Lab Results   Component Value Date    HGB 7.7 (L) 06/07/2023    WBC 3.0 (L) 06/07/2023    ANEU 1.0 (L) 06/07/2023    ANEUTAUTO 1.3 (L) 09/12/2022    PLT 8 (LL) 06/07/2023      Results reviewed, labs MET treatment parameters, ok to proceed with treatment.      Post Infusion Assessment:  Patient tolerated infusion without incident.  Blood return noted pre and post infusion.  Site patent and intact, free from redness, edema or discomfort.  No evidence of extravasations.  Access discontinued per protocol.       Discharge Plan:   Patient declined prescription refills.  Discharge instructions reviewed with: Patient.  Patient and/or family verbalized understanding of discharge instructions and all questions answered.  AVS to patient via [employer].  Patient will return as scheduled for next appointment.   Patient discharged in stable condition accompanied by: self.  Departure Mode: Ambulatory.      Jennie Grant RN

## 2023-06-13 NOTE — TELEPHONE ENCOUNTER
Home Care is calling regarding an established patient with [facility].       Requesting orders from: Shelli Cash  Provider is following patient: Yes  Is this a 60-day recertification request?  No    Orders Requested    Skilled Nursing  Request for delay in care, service is not able to be provided within same scheduled day.   Delay SOC until 6/15    Information was gathered and will be sent to provider for review.  RN will contact Home Care with information after provider review.  Confirmed ok to leave a detailed message with call back.  Contact information confirmed and updated as needed.    Kaykay Gaviria RN

## 2023-06-14 NOTE — TELEPHONE ENCOUNTER
Left message with information below, and to call back to clinic if further questions.  Kindra Ruff, RN  [facility] RN Triage Team

## 2023-06-16 NOTE — TELEPHONE ENCOUNTER
"Pt called requesting medication to help with urinary urgency since last night. She denies other symptoms. Triage asked if she has urinary incontinence concerns \"well yeah\".  Pt denies acute injury or vaginal symptoms. Triage advised she see UC for evaluation of symptoms. Pt verbalized agreement with plan. She was informed of UC hours.     Reason for Disposition    Patient wants to be seen    Additional Information    Negative: Shock suspected (e.g., cold/pale/clammy skin, too weak to stand, low BP, rapid pulse)    Negative: Sounds like a life-threatening emergency to the triager    Negative: Followed a female genital area injury (e.g., vagina, vulva)    Negative: Followed a male genital area injury (penis, scrotum)    Negative: Vaginal discharge    Negative: Pus (white, yellow) or bloody discharge from end of penis    Negative: Pain or burning with passing urine (urination) and pregnant    Negative: Pain or burning with passing urine (urination) and female    Negative: Pain or burning with passing urine (urination) and male    Negative: Pain or itching in the vulvar area    Negative: Pain in scrotum is main symptom    Negative: Blood in the urine is main symptom    Negative: Symptoms arising from use of a urinary catheter (e.g., coude, Mejia)    Negative: Unable to urinate (or only a few drops) > 4 hours and bladder feels very full (e.g., palpable bladder or strong urge to urinate)    Negative: Decreased urination and drinking very little and dehydration suspected (e.g., dark urine, no urine > 12 hours, very dry mouth, very lightheaded)    Negative: Patient sounds very sick or weak to the triager    Negative: Fever > 100.4 F  (38.0 C)    Negative: Side (flank) or lower back pain present    Negative: Can't control passage of urine (i.e., urinary incontinence) and new-onset (< 2 weeks) or worsening    Negative: Urinating more frequently than usual (i.e., frequency)    Negative: Bad or foul-smelling urine    Protocols " used: URINARY SYMPTOMS-A-OH

## 2023-06-17 NOTE — PROGRESS NOTES
Infusion Nursing Note:  Bonny Raymundo presents today for 1 unit pRBC's.    Patient seen by provider today: No   present during visit today: Not Applicable.    Note: N/A.      Intravenous Access:  Peripheral IV placed.    Treatment Conditions:  Lab Results   Component Value Date    HGB 7.1 (L) 06/16/2023    WBC 2.5 (L) 06/16/2023    ANEU 1.0 (L) 06/16/2023    ANEUTAUTO 1.3 (L) 09/12/2022    PLT 11 (LL) 06/16/2023      Results reviewed, labs MET treatment parameters, ok to proceed with treatment.  Blood transfusion consent signed 1/11/23.      Post Infusion Assessment:  Patient tolerated infusion without incident.  Blood return noted pre and post infusion.  Site patent and intact, free from redness, edema or discomfort.  No evidence of extravasations.  Access discontinued per protocol.       Discharge Plan:   Discharge instructions reviewed with: Patient.  Patient and/or family verbalized understanding of discharge instructions and all questions answered.  AVS to patient via [employer].  Patient will return 6/20/23 at [facility] for next appointment.   Patient discharged in stable condition accompanied by: self.  Departure Mode: Ambulatory.      Shant Nieto RN

## 2023-06-17 NOTE — TELEPHONE ENCOUNTER
I happened to see that Bonny has a urinalysis that is consistent with a urinary tract infection (UTI). Culture pending. She had this sample checked when she went earlier today for RBC transfusion, and mentioned at the lab that she has some pain with urination. She reports that otherwise she feels fine, no fever. She is mildly neutropenic, so I don't want to wait for cultures in order to treat.  I have ordered levaquin 500 mg daily x 7 days, sent ot [facility] on [address] in [place]. I instructed her to pick it up and start today. She should call us or go to ED if has fevers or feeling worse.   Minerva Spann MD

## 2023-06-19 NOTE — PROGRESS NOTES
"Infusion Nursing Note:  Bonny Raymundo presents today for Cycle 2 Day 1 azacitidine.    Patient seen by provider today: No   present during visit today: Not Applicable.    Note:     Pt denies any issues or concerns today. Reports she is feeling better since starting Levaquin. No infection symptoms, or active s/s of bleeding. Last had a blood transfusion on 6/17. Platelets were 11k (transfuse parameters less than 10k). Bonny states Dr. Spann was aware that she was \"on the line\". Message sent to confirm labs not to be rechecked until 6/29.     Pt did not take her zofran today; will take when she gets home. Instructed her to take prior to infusion appointments the remainder of the week. Pt verbalized understanding.     Vidaza dose was decreased from cycle one; pt asking if this was done and writer confirmed it was indeed dose reduced.       Intravenous Access:  No Intravenous access/labs at this visit.    Treatment Conditions:  Not Applicable.      Post Infusion Assessment:  Patient tolerated injection without incident one syringe to RIGHT lower abdomen.       Discharge Plan:   Discharge instructions reviewed with: Patient.  Copy of AVS reviewed with patient and/or family.  Patient will return tomorrow for next appointment.  Departure Mode: Ambulatory.      Eboni Atkinson RN  "

## 2023-06-20 NOTE — PATIENT INSTRUCTIONS
[facility] Triage and after hours / weekends / holidays:  [phone]    Please call the triage or after hours line if you experience a temperature greater than or equal to 100.4, shaking chills, have uncontrolled nausea, vomiting and/or diarrhea, dizziness, shortness of breath, chest pain, bleeding, unexplained bruising, or if you have any other new/concerning symptoms, questions or concerns.      If you are having any concerning symptoms or wish to speak to a provider before your next infusion visit, please call triage to notify them so we can adequately serve you.     If you need a refill on a narcotic prescription or other medication, please call before your infusion appointment.

## 2023-06-20 NOTE — PROGRESS NOTES
Infusion Nursing Note:  Bonny Raymundo presents today for Cycle 2 Day 2 Azacitidine.    Patient seen by provider today: No    Note: Patient presents to the infusion center today denying any new symptoms or concerns since her appt yesterday. She denies any bleeding or falls. Writer reinforced bleeding precautions. She reports ongoing shortness of breath with exertion. She continues to use a cane with ambulation. Denies any fevers, chills, chest pain or dizziness.     Patient took her Zofran prior to coming to infusion today.    Intravenous Access:  No Intravenous access/labs at this visit.    Treatment Conditions:  Not Applicable.    Post Infusion Assessment:  Patient tolerated injection without incident in left lower abd.     Discharge Plan:   Patient declined prescription refills.  Discharge instructions reviewed with: Patient.  Patient and/or family verbalized understanding of discharge instructions and all questions answered.  Copy of AVS reviewed with patient and/or family.  Patient will return 6/21 for next appointment.  AVS to patient via [employer].   Patient discharged in stable condition accompanied by: self.  Departure Mode: Ambulatory.      Rosalia Salguero RN

## 2023-06-21 NOTE — PROGRESS NOTES
"Infusion Nursing Note:  Bonny Raymundo presents today for Cycle 2 Day 3 Vidaza.    Patient seen by provider today: No   present during visit today: Not Applicable.    Note: Patient arrives feeling okay. States no acute changes overnight. OCASIO at baseline. States she feels \"wobbly\" at times but this is her baseline. Denies any falls or bleeding overnight. Denies increased fatigue, chest pain, or weakness. Took Zofran prior to appt this morning.      Treatment Conditions:  Lab Results   Component Value Date    HGB 7.1 (L) 06/16/2023    WBC 2.5 (L) 06/16/2023    ANEU 1.0 (L) 06/16/2023    ANEUTAUTO 1.3 (L) 09/12/2022    PLT 11 (LL) 06/16/2023      Lab Results   Component Value Date     06/16/2023    POTASSIUM 4.8 06/16/2023    MAG 1.8 06/02/2022    CR 1.16 (H) 06/16/2023    LEO 8.8 06/16/2023    BILITOTAL 0.7 06/07/2023    ALBUMIN 3.0 (L) 06/07/2023    ALT <5 (L) 06/07/2023    AST 8 (L) 06/07/2023     Results reviewed, labs MET treatment parameters, ok to proceed with treatment.      Post Infusion Assessment:  Patient tolerated (1) Vidaza injection without incident into RLQ.       Discharge Plan:   Patient declined prescription refills.  Discharge instructions reviewed with: Patient.  Patient and/or family verbalized understanding of discharge instructions and all questions answered.  AVS to patient via [employer].  Patient will return 6/22 @ 1300 for next appointment.   Patient discharged in stable condition accompanied by: self.  Departure Mode: Ambulatory with tim.      Ana Maria Muller RN    "

## 2023-06-22 NOTE — PROGRESS NOTES
Received in basket back from Dr. Spann today from message on 6/19 asking about CBC re-check prior to 6/29 (see note).     Dr. Spann would like a CBC drawn as platelets were only 11k last week.      Lab appointment is scheduled for tomorrow and infusion appointment note has already been updated (and platelets prepared).

## 2023-06-22 NOTE — PROGRESS NOTES
Infusion Nursing Note:  Bonny Raymundo presents today for Cycle 2 Day 4 Vidaza.    Patient spoke with provider today: No    Treatment Conditions:   Latest Reference Range & Units 06/16/23 11:32   Sodium 136 - 145 mmol/L 138   Potassium 3.4 - 5.3 mmol/L 4.8   Chloride 98 - 107 mmol/L 104   Carbon Dioxide (CO2) 22 - 29 mmol/L 24   Urea Nitrogen 8.0 - 23.0 mg/dL 29.5 (H)   Creatinine 0.51 - 0.95 mg/dL 1.16 (H)   GFR Estimate >60 mL/min/1.73m2 48 (L)   Calcium 8.8 - 10.2 mg/dL 8.8   Anion Gap 7 - 15 mmol/L 10   Glucose 70 - 99 mg/dL 108 (H)   WBC 4.0 - 11.0 10e3/uL 2.5 (L)   Hemoglobin 11.7 - 15.7 g/dL 7.1 (L)   Hematocrit 35.0 - 47.0 % 22.6 (L)   Platelet Count 150 - 450 10e3/uL 11 (LL)   RBC Count 3.80 - 5.20 10e6/uL 2.42 (L)   MCV 78 - 100 fL 93   MCH 26.5 - 33.0 pg 29.3   MCHC 31.5 - 36.5 g/dL 31.4 (L)   RDW 10.0 - 15.0 % 14.3   % Neutrophils % 40   % Lymphocytes % 43   % Monocytes % 16   % Eosinophils % 0   % Basophils % 0   % Metamyelocytes % 1   Absolute Basophils 0.0 - 0.2 10e3/uL 0.0   Absolute Neutrophil 1.6 - 8.3 10e3/uL 1.0 (L)   Absolute Lymphocytes 0.8 - 5.3 10e3/uL 1.1   Absolute Monocytes 0.0 - 1.3 10e3/uL 0.4   Absolute Eosinophils 0.0 - 0.7 10e3/uL 0.0   Absolute Metamyelocytes <=0.0 10e3/uL 0.0   RBC Morphology  Confirmed RBC Indices   Platelet Morphology Automated Count Confirmed. Platelet morphology is normal.  Automated Count Confirmed. Platelet morphology is abnormal. !   Acanthocytes None Seen  Slight !   Elliptocytes None Seen  Slight !   ABO/Rh(D)  A POS   Antibody Screen Negative  Negative   SPECIMEN EXPIRATION DATE  [id number]       Note: Pt denies fever/chills SOB or cough. Pt stated she took Zofran at home this morning before coming to appointment. Denies nausea/vomiting or trouble eating/drinking.  Pt stated she started taking Levaquin 6/17/23 for UTI. Her symptoms had been getting better, but today she has noticed urgency again. Denies bleeding or dark urine.  Dr. Spann  notified.    SHIRA Spann / Madison Vargas RN 6/22/23:  Send UA microscopic reflex to culture (UA sent before pt discharged today)  OK for Day 4 Vidaza today.    Neutropenic precautions reviewed with pt.  Instructed to call day or night with chills/temp >=100.4 or questions/concerns.  Educated on importance on good handwashing.  Pt stated understanding of plan.       Intravenous Access:  No Intravenous access/labs at this visit.    Post Infusion Assessment:  Patient tolerated injection to LLQ abdomen without incident.    Discharge Plan:   Patient declined prescription refills.  Discharge instructions reviewed with: Patient.  Patient and/or family verbalized understanding of discharge instructions and all questions answered.  AVS to patient via [employer].  Patient will return tomorrow for next appointment.   Patient discharged in stable condition accompanied by: self.  Departure Mode: Ambulatory.    Madison Vargas RN      Urgency today  And last week.     levaquin

## 2023-06-23 NOTE — PROGRESS NOTES
"Infusion Nursing Note:  Bonny Raymundo presents today for Cycle 2 Day 5 Vidaza + 2 units PRBCs + 1 unit Platelets + Add-on GCSF.    Patient seen by provider today: No   present during visit today: Not Applicable.    Note: Patient arrives feeling okay. She states she might feel a \"little more tired\" than usual accompanied by \"lightheadedness\". Denies chest pain, SOB, fever, chills or bleeding. Notified patient of lengthy visit today due to transfusion needs. Pt verbalized understanding. UA/UC was collected yesterday for returning of urgency (started Levaquin on 6/17 for UTI). However, today she states she hasn't noticed any symptoms since last night.    After one unit of platelets completed, patient requested to be disconnect from infusion to run downstairs to grab some food. VSS and IV saline locked. Pt returned within 15 min to start first unit of PRBCs. Writer flushed IV and started blood per procotol and no chills noted at this time. However, at 10-min check, assisting nurse noted that patient was visibly shaking with chills. VS taken and stable. Pt denied chest tightness, difficulty breathing, itching, feverish, or other symptoms. Infusion stopped and pt given additional warm blankets. Pt stated that she started to feel chilled when she was downstairs getting food. But as stated earlier, RN did not notice shaking at time of starting PRBCs. Blood restarted and ran for an additional 10 minutes at 120ml/hr and notified Dr. Spann.    Per [facility] Dr. Spann/Ana Maria Muller RN @1444 6/23/23:  - Unsure what to make of chills; aware of stable VS  - OK to increase rate to 270ml/hr after (second) 10-min vital check (as long as vitals stable)  - Adding 7 more days of Levaquin to pt's current tx - will send to retail  - Placing orders for one-time GCSF in Supportive plan since she is neutropenic    Pt updated with plan and verbalized understanding. Chills resolved and pt tolerated max transfusion rate without " further issue.    Due to transfusion delay, pt will not finish 2nd of platelets in time before infusion clinic closes.    Per secure chat Dr. Spann/Ana Maria Muller RN @[phone] 6/23/23:  - Get as much of second unit PRBCs as possible, but ok if not completed.       Intravenous Access:  Peripheral IV placed. 24g in right FA.    Treatment Conditions:  Lab Results   Component Value Date    HGB 6.2 (LL) 06/23/2023    WBC 1.2 (L) 06/23/2023    ANEU 0.2 (LL) 06/23/2023    ANEUTAUTO 1.3 (L) 09/12/2022    PLT 7 (LL) 06/23/2023      Lab Results   Component Value Date     06/16/2023    POTASSIUM 4.8 06/16/2023    MAG 1.8 06/02/2022    CR 1.16 (H) 06/16/2023    LEO 8.8 06/16/2023    BILITOTAL 0.7 06/07/2023    ALBUMIN 3.0 (L) 06/07/2023    ALT <5 (L) 06/07/2023    AST 8 (L) 06/07/2023     Results reviewed, labs MET treatment parameters, ok to proceed with treatment. Orders to transfuse 2 units for hgb <7 and 1 unit platelets for count <10k.  Blood transfusion consent signed 1/11/23.      Post Infusion Assessment:  Patient tolerated infusion without incident.  Patient tolerated Vidaza injection into RLQ without incident.  Patient tolerated GCSF injection into RUE without incident.  Blood return noted pre and post infusion.  Site patent and intact, free from redness, edema or discomfort.  No evidence of extravasations.  Access discontinued per protocol.       Discharge Plan:   Prescription refills given for Levaquin.  Discharge instructions reviewed with: Patient.  Patient and/or family verbalized understanding of discharge instructions and all questions answered.  AVS to patient via [employer]. Patient will return 6/29 for next appointment.   Patient discharged in stable condition accompanied by: self.  Departure Mode: Ambulatory.      Ana Maria Muller RN    Pt left in care of Bianca Pacheco RN @[phone].

## 2023-06-23 NOTE — TELEPHONE ENCOUNTER
I was caled by Ana Maria Muller RN. Bonny received a unit of platelets and then started pRBCs. She feels cold and had some shaking. No fever, hypotension or tachycardia. She reported that she also felt cold and shaky when she went of lunch. However she has no other complaints, and aparently the dysuria she had yesterday is not present today. She is neutropenic.   -OK to continue with PRBCs  -let me know if she has a fever  -Give filgastrim 300 mcg subcutaneous today x 1  -I have ordered another 7 days of levaquin to [facility] pharmacy. She can  before she leaves today.   Minerva Spann MD  Hematology

## 2023-06-28 NOTE — PROGRESS NOTES
Hematology clinic visit  In person visit     Problem list:  - Myelodysplastic syndrome with excess blasts-2 (MDS-EB 2).  Bone marrow biopsy 12/15/2022.   IPSS-R score 8.5-very high - based on cytogenetics -7, > 10% blasts, hemoglobin less than 8, platelet , ANC less than 0.8     Final Diagnosis   A.  Bone Marrow Biopsy from left posterior iliac crest and peripheral smear morphology:  - Myelodysplastic syndrome with excess blasts 2 (MDS-EB2) showing:       - Increased marrow blasts (13.3% on imprint smear differential, 12% on flow cytometry).       - Mildly hypercellular marrow for age , 30-35% .      - Moderately increased marrow fibrosis (MF-2).      - No appreciable dysplasia.      - Increased marrow storage iron with 84% sideroblasts, 6% ringed sideroblasts      - Peripheral blood pancytopenia showing:            - Moderate macrocytic, normochromic anemia without evidence of hemolysis or increased red cell regeneration, rare circulating nucleated red blood cell.           - Moderate leukopenia with rare circulating blasts without Bernice rods.           - Moderate to marked thrombocytopenia.   Electronically signed by Rick aBngura MD on 12/19/2022    Cytogenetics-46,XX,-7,+21[20]  Detected Alterations of Known or Potential Pathogenicity: RUNX1 G199E   Detected Alterations of Uncertain Significance: None   Genes with No Detected Clinically Significant Alterations: ABL1, ALK, ASXL1, ATRX, BCOR, CALR, CBL, CEBPA, CSF3R, DNMT3A, ETV6, EZH2, FLT3, GATA1, GATA2, HRAS, IDH1, IDH2, JAK1, JAK2, JAK3, KIT, KMT2A, KRAS, MPL, NF1, NPM1, NRAS, PDGFRA, PDGFRB, PHF6, PTPN11, JAMA, SETBP1, SF1, SF3A1, SF3B1, SH2B3, SRSF2, TET2, TP53, U2AF2, WT1, ZRSR2     She was initially diagnosed as aplastic anemia-diagnosed 9/26/2016, repeat bone marrow biopsy 12/22/16- bone marrow <5% cellular with no dysplasia.  Normal cytogenetics. PNH negative. 1/9/ 2017-treated with ATG, methylprednisolone, cyclosporine, and eltrombopag.  " Cyclosporine discontinued May 2021, eltrombopag discontinued June 2021 due to abnormal liver function test.    Partial remission with the immunotherapy, which is gradually weaned.  Now transfusion dependent  - Chronic kidney disease stage IV  - Atrial fibrillation-on apixaban chronically  -History of iron deficiency anemia- Venofer 300 mg IV 7/7/2021, 8/6/2021, 4/14/2022  -History of B12 deficiency  -MGUS  - COPD  - GERD  -History of lower GI bleed 2017  -Severely decreased bilateral hearing  - Benign paroxysmal positional vertigo  - History of unprovoked left leg deep vein thrombosis (DVT)  3/17/21 and bilateral pulmonary embolism (PE) 9/11/12  - Osteoporosis with compression fracture of spine.  - Shingles right face  2022  - Fall with right hip fracture 1/16/2023  - Subdural hematoma related to fall from standing 1/16/2023-no surgical intervention needed      Interval history:  is here for a toxicity check s/p C2 Azacitidine. She is overall doing well.  Her energy levels are stable, was unable to tell if fatigue improved with the Azacitdine dose reduction.  Is still able to perform ADLs and light housework.  She has developed new mouth sores and reports having 3 in her mouth at this time.  She is eating and drinking well and has noticed a dry mouth so she is \"thirsty all the time.\"  Continues with SOB with exertion.  Has generalized bruising to the skin.      Denies bleeding issues, fevers/chills, night sweats, rashes/sores, neuropathy.        PHYSICAL EXAMINATION:  /63 (BP Location: Left arm, Patient Position: Sitting, Cuff Size: Adult Regular)   Pulse 92   Temp 98.3  F (36.8  C) (Oral)   Wt 47.2 kg (104 lb 1.6 oz)   SpO2 99%   BMI 19.67 kg/m      General: No acute distress  HEENT: Sclera anicteric. Oral mucosa pink and moist.  No mucositis or thrush.  Blood blister to the inner lower lip  Lymph: No lymphadenopathy in neck  Heart: Regular, rate, and rhythm  Lungs: Clear to ascultation " bilaterally  Abdomen: Positive bowel sounds. Soft, non-distended, non-tender. No organomegaly or mass.   Extremities: no lower extremity edema  Neuro: Cranial nerves grossly intact  Skin: Generalized bruising, no rashes or sores on exposed skin    Labs:  Most Recent 3 CBC's:  Recent Labs   Lab Test 06/29/23  0718 06/23/23  1034 06/16/23  1132 10/05/22  1017 09/12/22  1443 07/08/22  1103 06/27/22  1312 06/16/22  1020 06/02/22  1741   WBC 1.0* 1.2* 2.5*   < > 3.1*   < > 2.6*   < > 2.2*   HGB 6.7* 6.2* 7.1*   < > 7.5*   < > 6.8*   < > 6.5*   MCV 96 96 93   < > 107*   < > 110*   < > 115*   PLT 2* 7* 11*   < > 80*   < > 111*   < > 121*   ANEUTAUTO  --   --   --   --  1.3*  --  1.0*  --  0.7*    < > = values in this interval not displayed.     Most Recent 3 BMP's:  Recent Labs   Lab Test 06/29/23  0718 06/16/23  1132 06/07/23  1045 05/31/23  0956 05/19/23  1112 05/05/23  1348 04/20/23  1054    138 139   < > 138   < > 141   POTASSIUM 4.0 4.8 4.0   < > 4.0   < > 3.9   CHLORIDE 107 104 106   < > 105   < > 109*   CO2 26 24 23   < > 21*   < > 25   BUN 34.5* 29.5* 23.2*   < > 27.3*   < > 29.2*   CR 1.26* 1.16* 1.14*   < > 1.37*   < > 1.09*   ANIONGAP 7 10 10   < > 12   < > 7   LEO 9.3 8.8 8.8   < > 9.0   < > 9.0   * 108* 118*   < > 124*   < > 111*   PROTTOTAL  --   --  7.5  --  7.9  --  7.6   ALBUMIN  --   --  3.0*  --  3.1*  --  3.3*    < > = values in this interval not displayed.    Most Recent 3 LFT's:  Recent Labs   Lab Test 06/07/23  1045 05/19/23  1112 04/20/23  1054   AST 8* 11 11   ALT <5* 8* 8*   ALKPHOS 103 116* 136*   BILITOTAL 0.7 1.1 0.6    Most Recent 2 TSH and T4:  Recent Labs   Lab Test 07/26/22  1123 04/30/18  1250   TSH 1.97 1.08     I reviewed the above labs today.    Assessment and recommendation:     # MDS-EB 2- IPSS-R score very high risk-this means her median overall survival is 0.8 years and it 25% chance of developing acute leukemia within 0.7 years.  Dr. Spann previously discussed she was  hopeful that the next time she receives the azacitidine, she will not feel as fatigued afterwards due to decreased  dose of azacitidine. S/p C2 and patient continues with fatigue and is unsure if she noticed any improvement in fatigue since last infusion.  She does have new blood blisters to the inside of her mouth.   - S/p C2 azacitidine 6/19 - 6/25/2023-  Decreased azacitidine dose by 25%  - Labs, SIMBA visit and C3 Azacitidine 7/17       #Anemia, thrombocytopenia-this is due to mainly to the MDS.  She has not responded to Aranesp.     -  CBC with possible RBC transfusion every 2 weeks  - Transfuse 1 unit PRBCs for hemoglobin 7.0-8 if symptomatic, 2 units for hemoglobin < 7.   - Transfuse 1 unit platelets for platelet count <10k or serious bleeding.  - Will administer 2 units PRBC and 1 unit Plt today- will do post-platelet count after 1 unit Plt  - Will try to get patient scheduled for labs and poss T next week but due to holiday scheduling she may be added to the waitlist.  Will schedule patient for weekly labs and transfusions.      #Frailty- attempts for home care evaluation have been delayed, Sue dang RN will follow-up. Not addressed today     #Chronic kidney disease- no significant change     #Subdural hematoma and subarachnoid hemorrhage after a fall- she had a DVT a couple of years ago. Risk of bleeding while on anticoagulation outweighs risk of thrombosis in view of significant chronic thrombocytopenia.  -No apixaban or other anticoagulant     #Driving-   recommend against driving-hearing, strength, reaction time not adequate     #CODE STATUS-  DO NOT RESUSCITATE order was placed in chart 1/11/2023.        46 minutes spent on the date of the encounter doing chart review, review of test results, interpretation of tests, patient visit and documentation     KHADRA Kauffman CNP

## 2023-06-29 NOTE — PATIENT INSTRUCTIONS
[facility] Triage and after hours / weekends / holidays:  [phone]    Please call the triage or after hours line if you experience a temperature greater than or equal to 100.4, shaking chills, have uncontrolled nausea, vomiting and/or diarrhea, dizziness, shortness of breath, chest pain, bleeding, unexplained bruising, or if you have any other new/concerning symptoms, questions or concerns.      If you are having any concerning symptoms or wish to speak to a provider before your next infusion visit, please call your care coordinator or triage to notify them so we can adequately serve you.     If you need a refill on a narcotic prescription or other medication, please call before your infusion appointment.

## 2023-06-29 NOTE — NURSING NOTE
"Oncology Rooming Note    June 29, 2023 7:43 AM   Bonny Raymundo is a 77 year old female who presents for:    Chief Complaint   Patient presents with     Hematology     Aplastic Anemia     Initial Vitals: /63 (BP Location: Left arm, Patient Position: Sitting, Cuff Size: Adult Regular)   Pulse 92   Temp 98.3  F (36.8  C) (Oral)   Wt 47.2 kg (104 lb 1.6 oz)   SpO2 99%   BMI 19.67 kg/m   Estimated body mass index is 19.67 kg/m  as calculated from the following:    Height as of 5/12/23: 1.549 m (5' 1\").    Weight as of this encounter: 47.2 kg (104 lb 1.6 oz). Body surface area is 1.43 meters squared.  No Pain (0) Comment: Data Unavailable   No LMP recorded. Patient has had a hysterectomy.  Allergies reviewed: Yes  Medications reviewed: Yes    Medications: Medication refills not needed today.  Pharmacy name entered into [facility]:    [facility] [place], [place] - [address], [address]  [facility] [place] - [address]  [id number] [place], [place] - [address]    Clinical concerns: Pt presents today for f/u appointment.      Cecilia Henry, EMT  6/29/2023              "

## 2023-06-29 NOTE — PROGRESS NOTES
Infusion Nursing Note:  Bonny Raymundo presents today for 1 unit platelets, 2 units PRBCs.    Patient seen by provider today: Yes: Jing Dillon CNP   present during visit today: Not Applicable.    Note: Bonny presents today feeling okay. She feels a little short of breath, but denies chest pain or cough. Offers no changes or concerns since visit with Jing Dillon prior to infusion.    Per Jing Dillon CNP 7157  Give 2 units PRBCs, 1 unit platelets  Check platelet count after platelet transfusion    Post-platelet transfusion count: 49    Bonny's blood pressure increased to SBPs in the 140s during her second (final) unit of blood. She denied any headaches or changes in vision. Upon completion of all blood products, SBP was in 160s. Again, Bonny reported no symptoms. Educated Bonny to monitor symptoms of headache, vision changes, changes in breathing, or chest pain/tightness and notify clinic triage or report to ED if these symptoms occur.      Intravenous Access:  Peripheral IV placed by vascular access.    Treatment Conditions:     Latest Reference Range & Units 06/29/23 07:18   Sodium 136 - 145 mmol/L 140   Potassium 3.4 - 5.3 mmol/L 4.0   Chloride 98 - 107 mmol/L 107   Carbon Dioxide (CO2) 22 - 29 mmol/L 26   Urea Nitrogen 8.0 - 23.0 mg/dL 34.5 (H)   Creatinine 0.51 - 0.95 mg/dL 1.26 (H)   GFR Estimate >60 mL/min/1.73m2 43 (L)   Calcium 8.8 - 10.2 mg/dL 9.3   Anion Gap 7 - 15 mmol/L 7   Glucose 70 - 99 mg/dL 113 (H)   WBC 4.0 - 11.0 10e3/uL 1.0 (L)   Hemoglobin 11.7 - 15.7 g/dL 6.7 (LL)   Hematocrit 35.0 - 47.0 % 21.5 (L)   Platelet Count 150 - 450 10e3/uL 2 (LL)   RBC Count 3.80 - 5.20 10e6/uL 2.24 (L)   MCV 78 - 100 fL 96   MCH 26.5 - 33.0 pg 29.9   MCHC 31.5 - 36.5 g/dL 31.2 (L)   RDW 10.0 - 15.0 % 14.6   % Neutrophils % 5   % Lymphocytes % 87   % Monocytes % 5   % Eosinophils % 0   % Basophils % 1   % Blasts % 2   Absolute Basophils 0.0 - 0.2 10e3/uL 0.0   Absolute Neutrophil 1.6 - 8.3 10e3/uL 0.1 (LL)    Absolute Lymphocytes 0.8 - 5.3 10e3/uL 0.9   Absolute Monocytes 0.0 - 1.3 10e3/uL 0.1   Absolute Eosinophils 0.0 - 0.7 10e3/uL 0.0   Absolute Blasts <=0.0 10e3/uL 0.0   RBC Morphology  Confirmed RBC Indices   Platelet Morphology Automated Count Confirmed. Platelet morphology is normal.  Automated Count Confirmed. Platelet morphology is normal.   Acanthocytes None Seen  Slight !   ABO/Rh(D)  A POS   Antibody Screen Negative  Negative   SPECIMEN EXPIRATION DATE  [id number]     Results reviewed, labs MET treatment parameters, ok to proceed with treatment.  Blood transfusion consent signed 01/11/23.      Post Infusion Assessment:  Patient tolerated infusion without incident.  Blood return noted pre and post infusion.  Site patent and intact, free from redness, edema or discomfort.  No evidence of extravasations.  Access discontinued per protocol.       Discharge Plan:   Patient declined prescription refills.  Discharge instructions reviewed with: Patient.  Patient and/or family verbalized understanding of discharge instructions and all questions answered.  AVS to patient via [employer].  Patient will return per Jing Dillon checkout orders for next appointments.   Patient discharged in stable condition accompanied by: self.  Departure Mode: Ambulatory.      Tracy Busch RN

## 2023-06-29 NOTE — TELEPHONE ENCOUNTER
DATE/TIME OF CALL RECEIVED FROM LAB:  06/29/23 at 7:34 AM   LAB TEST:  Hemoglobin 6.7 and platelets of 2     PROVIDER NOTIFIED?: Yes  PROVIDER NAME: Jing Mendoza notified at 7:39   DATE/TIME LAB VALUE REPORTED TO PROVIDER: 6/29/23 at 7:40   MECHANISM OF PROVIDER NOTIFICATION: Epic secure [employer]   PROVIDER RESPONSE: Provider will update infusion nurses with plan.

## 2023-06-29 NOTE — LETTER
6/29/2023         RE: Bonny Raymundo  [address]  [facility] [address]        Dear Colleague,    Thank you for referring your patient, Bonny Raymundo, to the [facility]. Please see a copy of my visit note below.    Hematology clinic visit  In person visit     Problem list:  - Myelodysplastic syndrome with excess blasts-2 (MDS-EB 2).  Bone marrow biopsy 12/15/2022.   IPSS-R score 8.5-very high - based on cytogenetics -7, > 10% blasts, hemoglobin less than 8, platelet , ANC less than 0.8     Final Diagnosis   A.  Bone Marrow Biopsy from left posterior iliac crest and peripheral smear morphology:  - Myelodysplastic syndrome with excess blasts 2 (MDS-EB2) showing:       - Increased marrow blasts (13.3% on imprint smear differential, 12% on flow cytometry).       - Mildly hypercellular marrow for age , 30-35% .      - Moderately increased marrow fibrosis (MF-2).      - No appreciable dysplasia.      - Increased marrow storage iron with 84% sideroblasts, 6% ringed sideroblasts      - Peripheral blood pancytopenia showing:            - Moderate macrocytic, normochromic anemia without evidence of hemolysis or increased red cell regeneration, rare circulating nucleated red blood cell.           - Moderate leukopenia with rare circulating blasts without Bernice rods.           - Moderate to marked thrombocytopenia.   Electronically signed by Rick Bangura MD on 12/19/2022    Cytogenetics-46,XX,-7,+21[20]  Detected Alterations of Known or Potential Pathogenicity: RUNX1 G199E   Detected Alterations of Uncertain Significance: None   Genes with No Detected Clinically Significant Alterations: ABL1, ALK, ASXL1, ATRX, BCOR, CALR, CBL, CEBPA, CSF3R, DNMT3A, ETV6, EZH2, FLT3, GATA1, GATA2, HRAS, IDH1, IDH2, JAK1, JAK2, JAK3, KIT, KMT2A, KRAS, MPL, NF1, NPM1, NRAS, PDGFRA, PDGFRB, PHF6, PTPN11, JAMA, SETBP1, SF1, SF3A1, SF3B1, SH2B3, SRSF2, TET2, TP53, U2AF2, WT1, ZRSR2     She  "was initially diagnosed as aplastic anemia-diagnosed 9/26/2016, repeat bone marrow biopsy 12/22/16- bone marrow <5% cellular with no dysplasia.  Normal cytogenetics. PNH negative. 1/9/ 2017-treated with ATG, methylprednisolone, cyclosporine, and eltrombopag.  Cyclosporine discontinued May 2021, eltrombopag discontinued June 2021 due to abnormal liver function test.    Partial remission with the immunotherapy, which is gradually weaned.  Now transfusion dependent  - Chronic kidney disease stage IV  - Atrial fibrillation-on apixaban chronically  -History of iron deficiency anemia- Venofer 300 mg IV 7/7/2021, 8/6/2021, 4/14/2022  -History of B12 deficiency  -MGUS  - COPD  - GERD  -History of lower GI bleed 2017  -Severely decreased bilateral hearing  - Benign paroxysmal positional vertigo  - History of unprovoked left leg deep vein thrombosis (DVT)  3/17/21 and bilateral pulmonary embolism (PE) 9/11/12  - Osteoporosis with compression fracture of spine.  - Shingles right face  2022  - Fall with right hip fracture 1/16/2023  - Subdural hematoma related to fall from standing 1/16/2023-no surgical intervention needed      Interval history:  is here for a toxicity check s/p C2 Azacitidine. She is overall doing well.  Her energy levels are stable, was unable to tell if fatigue improved with the Azacitdine dose reduction.  Is still able to perform ADLs and light housework.  She has developed new mouth sores and reports having 3 in her mouth at this time.  She is eating and drinking well and has noticed a dry mouth so she is \"thirsty all the time.\"  Continues with SOB with exertion.  Has generalized bruising to the skin.      Denies bleeding issues, fevers/chills, night sweats, rashes/sores, neuropathy.        PHYSICAL EXAMINATION:  /63 (BP Location: Left arm, Patient Position: Sitting, Cuff Size: Adult Regular)   Pulse 92   Temp 98.3  F (36.8  C) (Oral)   Wt 47.2 kg (104 lb 1.6 oz)   SpO2 99%   BMI 19.67 " kg/m      General: No acute distress  HEENT: Sclera anicteric. Oral mucosa pink and moist.  No mucositis or thrush.  Blood blister to the inner lower lip  Lymph: No lymphadenopathy in neck  Heart: Regular, rate, and rhythm  Lungs: Clear to ascultation bilaterally  Abdomen: Positive bowel sounds. Soft, non-distended, non-tender. No organomegaly or mass.   Extremities: no lower extremity edema  Neuro: Cranial nerves grossly intact  Skin: Generalized bruising, no rashes or sores on exposed skin    Labs:  Most Recent 3 CBC's:  Recent Labs   Lab Test 06/29/23  0718 06/23/23  1034 06/16/23  1132 10/05/22  1017 09/12/22  1443 07/08/22  1103 06/27/22  1312 06/16/22  1020 06/02/22  1741   WBC 1.0* 1.2* 2.5*   < > 3.1*   < > 2.6*   < > 2.2*   HGB 6.7* 6.2* 7.1*   < > 7.5*   < > 6.8*   < > 6.5*   MCV 96 96 93   < > 107*   < > 110*   < > 115*   PLT 2* 7* 11*   < > 80*   < > 111*   < > 121*   ANEUTAUTO  --   --   --   --  1.3*  --  1.0*  --  0.7*    < > = values in this interval not displayed.     Most Recent 3 BMP's:  Recent Labs   Lab Test 06/29/23  0718 06/16/23  1132 06/07/23  1045 05/31/23  0956 05/19/23  1112 05/05/23  1348 04/20/23  1054    138 139   < > 138   < > 141   POTASSIUM 4.0 4.8 4.0   < > 4.0   < > 3.9   CHLORIDE 107 104 106   < > 105   < > 109*   CO2 26 24 23   < > 21*   < > 25   BUN 34.5* 29.5* 23.2*   < > 27.3*   < > 29.2*   CR 1.26* 1.16* 1.14*   < > 1.37*   < > 1.09*   ANIONGAP 7 10 10   < > 12   < > 7   LEO 9.3 8.8 8.8   < > 9.0   < > 9.0   * 108* 118*   < > 124*   < > 111*   PROTTOTAL  --   --  7.5  --  7.9  --  7.6   ALBUMIN  --   --  3.0*  --  3.1*  --  3.3*    < > = values in this interval not displayed.    Most Recent 3 LFT's:  Recent Labs   Lab Test 06/07/23  1045 05/19/23  1112 04/20/23  1054   AST 8* 11 11   ALT <5* 8* 8*   ALKPHOS 103 116* 136*   BILITOTAL 0.7 1.1 0.6    Most Recent 2 TSH and T4:  Recent Labs   Lab Test 07/26/22  1123 04/30/18  1250   TSH 1.97 1.08     I reviewed the  above labs today.    Assessment and recommendation:     # MDS-EB 2- IPSS-R score very high risk-this means her median overall survival is 0.8 years and it 25% chance of developing acute leukemia within 0.7 years.  Dr. Spann previously discussed she was hopeful that the next time she receives the azacitidine, she will not feel as fatigued afterwards due to decreased  dose of azacitidine. S/p C2 and patient continues with fatigue and is unsure if she noticed any improvement in fatigue since last infusion.  She does have new blood blisters to the inside of her mouth.   - S/p C2 azacitidine 6/19 - 6/25/2023-  Decreased azacitidine dose by 25%  - Labs, SIMBA visit and C3 Azacitidine 7/17       #Anemia, thrombocytopenia-this is due to mainly to the MDS.  She has not responded to Aranesp.     -  CBC with possible RBC transfusion every 2 weeks  - Transfuse 1 unit PRBCs for hemoglobin 7.0-8 if symptomatic, 2 units for hemoglobin < 7.   - Transfuse 1 unit platelets for platelet count <10k or serious bleeding.  - Will administer 2 units PRBC and 1 unit Plt today- will do post-platelet count after 1 unit Plt  - Will try to get patient scheduled for labs and poss T next week but due to holiday scheduling she may be added to the waitlist.  Will schedule patient for weekly labs and transfusions.      #Frailty- attempts for home care evaluation have been delayed, Sue dang RN will follow-up. Not addressed today     #Chronic kidney disease- no significant change     #Subdural hematoma and subarachnoid hemorrhage after a fall- she had a DVT a couple of years ago. Risk of bleeding while on anticoagulation outweighs risk of thrombosis in view of significant chronic thrombocytopenia.  -No apixaban or other anticoagulant     #Driving-   recommend against driving-hearing, strength, reaction time not adequate     #CODE STATUS-  DO NOT RESUSCITATE order was placed in chart 1/11/2023.        46 minutes spent on the date of the encounter  doing chart review, review of test results, interpretation of tests, patient visit and documentation     KHADRA Kauffman CNP

## 2023-07-06 NOTE — PROGRESS NOTES
Blood Product Transfusion Nursing Note:    Bonny Raymundo presents today to [facility] for a 1 unit Platelet and 1 unit PRBC transfusion.  During today's [facility] appointment orders from BUSHRA TERESA were completed.    Progress note:  ID verified by name and .  Assessment completed.  Vitals were stable throughout time in [facility].    verbal education given to patient/representative regarding transfusion and possible side effects.  Patient/representative verbalized understanding.     present during visit today: Not Applicable.    All pertinent labs reviewed prior to infusion: YES     Latest Reference Range & Units 23 08:19   Platelet Count 150 - 450 10e3/uL <2 (LL)   (LL): Data is critically low     Latest Reference Range & Units 23 08:19   Hemoglobin 11.7 - 15.7 g/dL 7.7 (L)   (L): Data is abnormally low        Date of consent or authorization: 23    Patient tolerated the procedure well. Patient discharged in stable condition.    Transfusion given over approximately: 1 unit Platelet infused at 120/hr for the first 10 minutes and 270 ml for the remaining volume over around 1 hr and 20 minutes. 1 unit PRBC infused at 120 ml/hr for the first 10 minutes and 270 ml for the remaining volume over around 1 hr and 30 minutes. .    Discharge Plan:    Discharge instructions were reviewed with patient Yes  Patient/representative verbalized understanding of discharge instructions and all questions answered Yes.        Neda Davies RN    /66 (BP Location: Left arm, Patient Position: Semi-Zhou's, Cuff Size: Adult Small)   Pulse 83   Temp 98.5  F (36.9  C) (Oral)   Resp 16   Wt 45.4 kg (100 lb 1.6 oz)   SpO2 97%   BMI 18.91 kg/m

## 2023-07-06 NOTE — NURSING NOTE
Chief Complaint   Patient presents with     Blood Draw     Labs drawn from PIV placed by RN. Line flushed with saline. Vitals taken. Pt checked in for appointment(s).      Becka LEES RN PHN BSN  BMT/Oncology Lab

## 2023-07-06 NOTE — PATIENT INSTRUCTIONS
Dear Bonny Raymundo    Thank you for choosing [facility] for your transfusion.  The following information is a summary of our appointment as well as important reminders.          We look forward in seeing you on your next appointment here at [facility]  Please don t hesitate to call us at [phone] to reschedule any of your appointments or to speak with one of the Psychiatric registered nurses.  It was a pleasure taking care of you today.    Sincerely,    [facility]  [facility]  [address] [place] [address]  [address]  Phone:  [phone]

## 2023-07-13 NOTE — PROGRESS NOTES
Hematology clinic visit  07/14/2023     Problem list:  - Myelodysplastic syndrome with excess blasts-2 (MDS-EB 2).  Bone marrow biopsy 12/15/2022.   IPSS-R score 8.5-very high - based on cytogenetics -7, > 10% blasts, hemoglobin less than 8, platelet , ANC less than 0.8     Final Diagnosis   A.  Bone Marrow Biopsy from left posterior iliac crest and peripheral smear morphology:  - Myelodysplastic syndrome with excess blasts 2 (MDS-EB2) showing:       - Increased marrow blasts (13.3% on imprint smear differential, 12% on flow cytometry).       - Mildly hypercellular marrow for age , 30-35% .      - Moderately increased marrow fibrosis (MF-2).      - No appreciable dysplasia.      - Increased marrow storage iron with 84% sideroblasts, 6% ringed sideroblasts      - Peripheral blood pancytopenia showing:            - Moderate macrocytic, normochromic anemia without evidence of hemolysis or increased red cell regeneration, rare circulating nucleated red blood cell.           - Moderate leukopenia with rare circulating blasts without Bernice rods.           - Moderate to marked thrombocytopenia.   Electronically signed by Rick Bangura MD on 12/19/2022    Cytogenetics-46,XX,-7,+21[20]  Detected Alterations of Known or Potential Pathogenicity: RUNX1 G199E   Detected Alterations of Uncertain Significance: None   Genes with No Detected Clinically Significant Alterations: ABL1, ALK, ASXL1, ATRX, BCOR, CALR, CBL, CEBPA, CSF3R, DNMT3A, ETV6, EZH2, FLT3, GATA1, GATA2, HRAS, IDH1, IDH2, JAK1, JAK2, JAK3, KIT, KMT2A, KRAS, MPL, NF1, NPM1, NRAS, PDGFRA, PDGFRB, PHF6, PTPN11, JAMA, SETBP1, SF1, SF3A1, SF3B1, SH2B3, SRSF2, TET2, TP53, U2AF2, WT1, ZRSR2     She was initially diagnosed as aplastic anemia-diagnosed 9/26/2016, repeat bone marrow biopsy 12/22/16- bone marrow <5% cellular with no dysplasia.  Normal cytogenetics. PNH negative. 1/9/ 2017-treated with ATG, methylprednisolone, cyclosporine, and eltrombopag.  " Cyclosporine discontinued May 2021, eltrombopag discontinued June 2021 due to abnormal liver function test.    Partial remission with the immunotherapy, which is gradually weaned.  Now transfusion dependent  - Chronic kidney disease stage IV  - Atrial fibrillation-on apixaban chronically  -History of iron deficiency anemia- Venofer 300 mg IV 7/7/2021, 8/6/2021, 4/14/2022  -History of B12 deficiency  -MGUS  - COPD  - GERD  -History of lower GI bleed 2017  -Severely decreased bilateral hearing  - Benign paroxysmal positional vertigo  - History of unprovoked left leg deep vein thrombosis (DVT)  3/17/21 and bilateral pulmonary embolism (PE) 9/11/12  - Osteoporosis with compression fracture of spine.  - Shingles right face  2022  - Fall with right hip fracture 1/16/2023  - Subdural hematoma related to fall from standing 1/16/2023-no surgical intervention needed      Interval history:    presents to clinic for follow-up.  She reports feeling \"terrible\" and being frustrated that she is feeling so poorly.  She is experiencing SOB, fatigue, light-headed and dizziness.  Patient was closed off during the visit and not willing to discuss other symptoms.   Denies bleeding issues, hematuria, hematochezia, mouth sores, fevers/chills.      PHYSICAL EXAMINATION:  BP (!) 87/59 (Patient Position: Standing)   Pulse 118   Temp 97.9  F (36.6  C) (Oral)   Resp 16   Wt 44.9 kg (99 lb)   SpO2 100%   BMI 18.71 kg/m      General: No acute distress, guarded  HEENT: Sclera anicteric. Oral mucosa pink and moist.  No mucositis or thrush.  Blood blister to the inner lower lip  Heart: Regular, rate, and rhythm  Lungs: Clear to ascultation bilaterally  Abdomen: Positive bowel sounds.   Extremities: no lower extremity edema  Neuro: Cranial nerves grossly intact  Skin: Generalized bruising, no rashes or sores on exposed skin    Labs:  Most Recent 3 CBC's:  Recent Labs   Lab Test 07/14/23  0758 07/06/23  0819 06/29/23  1013 " 06/29/23  0718 10/05/22  1017 09/12/22  1443 07/08/22  1103 06/27/22  1312 06/16/22  1020 06/02/22  1741   WBC 1.0* 1.2*  --  1.0*   < > 3.1*   < > 2.6*   < > 2.2*   HGB 6.4* 7.7*  --  6.7*   < > 7.5*   < > 6.8*   < > 6.5*   MCV 95 94  --  96   < > 107*   < > 110*   < > 115*   PLT 2* <2* 49* 2*   < > 80*   < > 111*   < > 121*   ANEUTAUTO  --   --   --   --   --  1.3*  --  1.0*  --  0.7*    < > = values in this interval not displayed.     Most Recent 3 BMP's:  Recent Labs   Lab Test 07/14/23  0758 06/29/23 0718 06/16/23  1132 06/07/23  1045 05/31/23  0956 05/19/23  1112 05/05/23  1348 04/20/23  1054    140 138 139   < > 138   < > 141   POTASSIUM 4.0 4.0 4.8 4.0   < > 4.0   < > 3.9   CHLORIDE 107 107 104 106   < > 105   < > 109*   CO2 23 26 24 23   < > 21*   < > 25   BUN 36.8* 34.5* 29.5* 23.2*   < > 27.3*   < > 29.2*   CR 1.18* 1.26* 1.16* 1.14*   < > 1.37*   < > 1.09*   ANIONGAP 9 7 10 10   < > 12   < > 7   LEO 9.1 9.3 8.8 8.8   < > 9.0   < > 9.0   * 113* 108* 118*   < > 124*   < > 111*   PROTTOTAL  --   --   --  7.5  --  7.9  --  7.6   ALBUMIN  --   --   --  3.0*  --  3.1*  --  3.3*    < > = values in this interval not displayed.    Most Recent 3 LFT's:  Recent Labs   Lab Test 06/07/23  1045 05/19/23  1112 04/20/23  1054   AST 8* 11 11   ALT <5* 8* 8*   ALKPHOS 103 116* 136*   BILITOTAL 0.7 1.1 0.6    Most Recent 2 TSH and T4:  Recent Labs   Lab Test 07/26/22  1123 04/30/18  1250   TSH 1.97 1.08     I reviewed the above labs today.    Assessment and recommendation:     # MDS-EB 2- IPSS-R score very high risk-this means her median overall survival is 0.8 years and it 25% chance of developing acute leukemia within 0.7 years.  Dr. Spann previously discussed she was hopeful that the next time she receives the azacitidine, she will not feel as fatigued afterwards due to decreased  dose of azacitidine. S/p C2 and patient continues with fatigue and is unsure if she noticed any improvement in fatigue since  "last infusion.  She does have new blood blisters to the inside of her mouth.   - S/p C2 azacitidine 6/19 - 6/25/2023-  Decreased azacitidine dose by 25%.  Patient reports feeling \"terrible\" and experiencing SOB, light-headed, dizziness, and fatigue.    - Labs and C3 Azacitidine 7/25- patient requesting to see SIMBA at [facility] prior to [facility]    Attempted to discuss with patients that she needs labs and transfusion more than once a week due to low blood counts.  Discussed that when platelets are <10k she is at risk for spontaneous bleeding and when Hgb is low it is contributing to dizziness, SOB, and fatigue and increases patients risk for falls.  Patient stated \"I am not listening\" and that \"she can't think about this right now\".  Informed patient I would write down my recommendations and concerns for her health/safety in the AVS so she could go home and take time to think it over.  She would like she transfusions done at [facility] but unfortunately they do not offer same day T&S for infusions.  I informed her if she wants to go to [facility] she wound need labs the day prior to her possible transfusions.  Patient is only willing to have labs and transfusion on Friday next week.     #Anemia, thrombocytopenia-this is due to mainly to the MDS.  She has not responded to Aranesp.     -  CBC with possible RBC transfusion 2x/week  - Transfuse 1 unit PRBCs for hemoglobin 7.0-8 if symptomatic, 2 units for hemoglobin < 7.   - Transfuse 1 unit platelets for platelet count <10k or serious bleeding.  - Will administer 2 units PRBC and 1 unit Plt today- will do post-platelet count after 1 unit Plt     #Frailty- attempts for home care evaluation have been delayed, Sue dang RN will follow-up. Not addressed today     #Chronic kidney disease- no significant change, Crt 1.18 today and GFR 47     #Subdural hematoma and subarachnoid hemorrhage after a fall- she had a DVT a couple of years ago. Risk of bleeding while on anticoagulation " outweighs risk of thrombosis in view of significant chronic thrombocytopenia.  -No apixaban or other anticoagulant     #Driving-   recommend against driving-hearing, strength, reaction time not adequate     #CODE STATUS-  DO NOT RESUSCITATE order was placed in chart 1/11/2023.        63 minutes spent on the date of the encounter doing chart review, review of test results, interpretation of tests, patient visit and documentation     KHADRA Kauffman CNP

## 2023-07-14 NOTE — PROGRESS NOTES
Infusion Nursing Note:  Bonny Raymundo presents today for 1 pack platelets, 2 units PRBC's.    Patient seen by provider today: Yes: [facility] SIMBA   present during visit today: Not Applicable.    Note: BP on the lower side.  Reports feeling dizzy at times and unsteady.  She can't tell me for how long she's been feeling this way.  She arrives to infusion in a wheelchair.    Evaluated by [employer] [facility] while in infusion.    Patient tells me she's only been taking her Levaquin on the days of her Vidaza.  I have explained to her that this is a medication she is to take daily as she is neutropenic which puts her at risk for infection.      SHIRA Pacheco RN/[facility] SIMBA  30 minute post platelet count today.    Post platelet count 44k.        Intravenous Access:  Peripheral IV placed in lab    Treatment Conditions:  Lab Results   Component Value Date    HGB 6.4 (LL) 07/14/2023    WBC 1.0 (L) 07/14/2023    ANEU 0.2 (LL) 07/14/2023    ANEUTAUTO 1.3 (L) 09/12/2022    PLT 2 (LL) 07/14/2023      Lab Results   Component Value Date     07/14/2023    POTASSIUM 4.0 07/14/2023    MAG 1.8 06/02/2022    CR 1.18 (H) 07/14/2023    LEO 9.1 07/14/2023    BILITOTAL 0.7 06/07/2023    ALBUMIN 3.0 (L) 06/07/2023    ALT <5 (L) 06/07/2023    AST 8 (L) 06/07/2023     Blood transfusion consent signed 1/11/23.      Post Infusion Assessment:  Patient tolerated infusion without incident.  Blood return noted pre and post infusion.  Site patent and intact, free from redness, edema or discomfort.  No evidence of extravasations.  Access discontinued per protocol.       Discharge Plan:   Patient declined prescription refills.  Copy of AVS reviewed with patient and/or family.  Patient will return wait listed for 7/21/23 for next appointment.  Patient discharged in stable condition accompanied by: self.  Departure Mode: Wheelchair.      Bianca Pacheco RN

## 2023-07-14 NOTE — PATIENT INSTRUCTIONS
Instructions from Jing the Nurse Practioner you saw today.    Bonny here is the information I discussed with you today:     I would recommend you getting labs and blood transfusions twice a week.  You platelet count has been critically low and you are at risk for spontaneous bleeding.  Your red blood cells have also been critically low and this is contributing to why you feel so poorly- short of breath, fatigued, dizzy, light-headed. I am concerned you are at risk for falls due to having a low blood pressure and feeling light-headed.  If you fall you are at risk for significant bleeding because your platelet count is so low. If you would come in twice a week for labs and transfusion this could help you feel better.      We could schedule you for labs and transfusions at [facility] but unfortunately [facility] does not do the type and screen the same day and the transfusion.  If you wanted labs and transfusions at [facility] you would need to go for labs on Monday and get a transfusions Tuesday and do labs again on Thursday and a transfusion on Friday.     If you came here to the [facility] we would schedule you for labs and transfusions on Tuesday and Friday or Mondays and Thursdays, whichever you would prefer.     I will try and get you scheduled for labs and transfusions this coming Friday 7/21 here at the [facility].  I will work on getting you scheduled for your Azacitidine at [facility] 7/24-7/28 and have you see a provider at the [facility].  I will get you scheduled with Dr. Spann for her soonest available appointment.     Please call [phone] and ask for Sue Marquez RN with questions or concerns.        [facility] Main Line: [phone]    Call triage nurse with chills and/or temperature greater than or equal to 100.4, uncontrolled nausea/vomiting, diarrhea, constipation, dizziness, shortness of breath, chest pain, bleeding, unexplained bruising, or any new/concerning symptoms,  questions/concerns.   If you are having any concerning symptoms or wish to speak to a provider before your next infusion visit, please call your care coordinator or triage to notify them so we can adequately serve you.   Triage Nurse Line: [phone]    If after hours, weekends, or holidays [phone]

## 2023-07-14 NOTE — NURSING NOTE
"Chief Complaint   Patient presents with     Blood Draw     Labs drawn via PIV by RN in lab.  VS taken        Labs drawn from PIV placed by RN. Line flushed with saline. Vitals taken. Pt checked in for appointment(s).    Bps low- see flow sheets.  Pt states she feels slightly lightheaded this am.  Says \"it's too early to be up\".   Infusion RN notified of low Bps and wondering if she could be seen in infusion right away.  They are \"backed up right now\".  Pt steady when standing, but placed in a wheel chair.  Assisted to the bathroom and she was steady and denied dizziness when up.  Checked in for appointments and placed in the lobby.    Julia Lugo RN    "

## 2023-07-14 NOTE — LETTER
7/14/2023         RE: Bonny Raymundo  [address]  [facility] [address]        Dear Colleague,    Thank you for referring your patient, Bonny Raymundo, to the [facility]. Please see a copy of my visit note below.    Hematology clinic visit  07/14/2023     Problem list:  - Myelodysplastic syndrome with excess blasts-2 (MDS-EB 2).  Bone marrow biopsy 12/15/2022.   IPSS-R score 8.5-very high - based on cytogenetics -7, > 10% blasts, hemoglobin less than 8, platelet , ANC less than 0.8     Final Diagnosis   A.  Bone Marrow Biopsy from left posterior iliac crest and peripheral smear morphology:  - Myelodysplastic syndrome with excess blasts 2 (MDS-EB2) showing:       - Increased marrow blasts (13.3% on imprint smear differential, 12% on flow cytometry).       - Mildly hypercellular marrow for age , 30-35% .      - Moderately increased marrow fibrosis (MF-2).      - No appreciable dysplasia.      - Increased marrow storage iron with 84% sideroblasts, 6% ringed sideroblasts      - Peripheral blood pancytopenia showing:            - Moderate macrocytic, normochromic anemia without evidence of hemolysis or increased red cell regeneration, rare circulating nucleated red blood cell.           - Moderate leukopenia with rare circulating blasts without Bernice rods.           - Moderate to marked thrombocytopenia.   Electronically signed by Rick Bangura MD on 12/19/2022    Cytogenetics-46,XX,-7,+21[20]  Detected Alterations of Known or Potential Pathogenicity: RUNX1 G199E   Detected Alterations of Uncertain Significance: None   Genes with No Detected Clinically Significant Alterations: ABL1, ALK, ASXL1, ATRX, BCOR, CALR, CBL, CEBPA, CSF3R, DNMT3A, ETV6, EZH2, FLT3, GATA1, GATA2, HRAS, IDH1, IDH2, JAK1, JAK2, JAK3, KIT, KMT2A, KRAS, MPL, NF1, NPM1, NRAS, PDGFRA, PDGFRB, PHF6, PTPN11, JAMA, SETBP1, SF1, SF3A1, SF3B1, SH2B3, SRSF2, TET2, TP53, U2AF2, WT1, ZRSR2     She was  "initially diagnosed as aplastic anemia-diagnosed 9/26/2016, repeat bone marrow biopsy 12/22/16- bone marrow <5% cellular with no dysplasia.  Normal cytogenetics. PNH negative. 1/9/ 2017-treated with ATG, methylprednisolone, cyclosporine, and eltrombopag.  Cyclosporine discontinued May 2021, eltrombopag discontinued June 2021 due to abnormal liver function test.    Partial remission with the immunotherapy, which is gradually weaned.  Now transfusion dependent  - Chronic kidney disease stage IV  - Atrial fibrillation-on apixaban chronically  -History of iron deficiency anemia- Venofer 300 mg IV 7/7/2021, 8/6/2021, 4/14/2022  -History of B12 deficiency  -MGUS  - COPD  - GERD  -History of lower GI bleed 2017  -Severely decreased bilateral hearing  - Benign paroxysmal positional vertigo  - History of unprovoked left leg deep vein thrombosis (DVT)  3/17/21 and bilateral pulmonary embolism (PE) 9/11/12  - Osteoporosis with compression fracture of spine.  - Shingles right face  2022  - Fall with right hip fracture 1/16/2023  - Subdural hematoma related to fall from standing 1/16/2023-no surgical intervention needed      Interval history:    presents to clinic for follow-up.  She reports feeling \"terrible\" and being frustrated that she is feeling so poorly.  She is experiencing SOB, fatigue, light-headed and dizziness.  Patient was closed off during the visit and not willing to discuss other symptoms.   Denies bleeding issues, hematuria, hematochezia, mouth sores, fevers/chills.      PHYSICAL EXAMINATION:  BP (!) 87/59 (Patient Position: Standing)   Pulse 118   Temp 97.9  F (36.6  C) (Oral)   Resp 16   Wt 44.9 kg (99 lb)   SpO2 100%   BMI 18.71 kg/m      General: No acute distress, guarded  HEENT: Sclera anicteric. Oral mucosa pink and moist.  No mucositis or thrush.  Blood blister to the inner lower lip  Heart: Regular, rate, and rhythm  Lungs: Clear to ascultation bilaterally  Abdomen: Positive bowel " sounds.   Extremities: no lower extremity edema  Neuro: Cranial nerves grossly intact  Skin: Generalized bruising, no rashes or sores on exposed skin    Labs:  Most Recent 3 CBC's:  Recent Labs   Lab Test 07/14/23  0758 07/06/23  0819 06/29/23  1013 06/29/23  0718 10/05/22  1017 09/12/22  1443 07/08/22  1103 06/27/22  1312 06/16/22  1020 06/02/22  1741   WBC 1.0* 1.2*  --  1.0*   < > 3.1*   < > 2.6*   < > 2.2*   HGB 6.4* 7.7*  --  6.7*   < > 7.5*   < > 6.8*   < > 6.5*   MCV 95 94  --  96   < > 107*   < > 110*   < > 115*   PLT 2* <2* 49* 2*   < > 80*   < > 111*   < > 121*   ANEUTAUTO  --   --   --   --   --  1.3*  --  1.0*  --  0.7*    < > = values in this interval not displayed.     Most Recent 3 BMP's:  Recent Labs   Lab Test 07/14/23  0758 06/29/23  0718 06/16/23  1132 06/07/23  1045 05/31/23  0956 05/19/23  1112 05/05/23  1348 04/20/23  1054    140 138 139   < > 138   < > 141   POTASSIUM 4.0 4.0 4.8 4.0   < > 4.0   < > 3.9   CHLORIDE 107 107 104 106   < > 105   < > 109*   CO2 23 26 24 23   < > 21*   < > 25   BUN 36.8* 34.5* 29.5* 23.2*   < > 27.3*   < > 29.2*   CR 1.18* 1.26* 1.16* 1.14*   < > 1.37*   < > 1.09*   ANIONGAP 9 7 10 10   < > 12   < > 7   LEO 9.1 9.3 8.8 8.8   < > 9.0   < > 9.0   * 113* 108* 118*   < > 124*   < > 111*   PROTTOTAL  --   --   --  7.5  --  7.9  --  7.6   ALBUMIN  --   --   --  3.0*  --  3.1*  --  3.3*    < > = values in this interval not displayed.    Most Recent 3 LFT's:  Recent Labs   Lab Test 06/07/23  1045 05/19/23  1112 04/20/23  1054   AST 8* 11 11   ALT <5* 8* 8*   ALKPHOS 103 116* 136*   BILITOTAL 0.7 1.1 0.6    Most Recent 2 TSH and T4:  Recent Labs   Lab Test 07/26/22  1123 04/30/18  1250   TSH 1.97 1.08     I reviewed the above labs today.    Assessment and recommendation:     # MDS-EB 2- IPSS-R score very high risk-this means her median overall survival is 0.8 years and it 25% chance of developing acute leukemia within 0.7 years.  Dr. Spann previously discussed  "she was hopeful that the next time she receives the azacitidine, she will not feel as fatigued afterwards due to decreased  dose of azacitidine. S/p C2 and patient continues with fatigue and is unsure if she noticed any improvement in fatigue since last infusion.  She does have new blood blisters to the inside of her mouth.   - S/p C2 azacitidine 6/19 - 6/25/2023-  Decreased azacitidine dose by 25%.  Patient reports feeling \"terrible\" and experiencing SOB, light-headed, dizziness, and fatigue.    - Labs and C3 Azacitidine 7/25- patient requesting to see SIMBA at [facility] prior to Aza    Attempted to discuss with patients that she needs labs and transfusion more than once a week due to low blood counts.  Discussed that when platelets are <10k she is at risk for spontaneous bleeding and when Hgb is low it is contributing to dizziness, SOB, and fatigue and increases patients risk for falls.  Patient stated \"I am not listening\" and that \"she can't think about this right now\".  Informed patient I would write down my recommendations and concerns for her health/safety in the AVS so she could go home and take time to think it over.  She would like she transfusions done at [facility] but unfortunately they do not offer same day T&S for infusions.  I informed her if she wants to go to [facility] she wound need labs the day prior to her possible transfusions.  Patient is only willing to have labs and transfusion on Friday next week.     #Anemia, thrombocytopenia-this is due to mainly to the MDS.  She has not responded to Aranesp.     -  CBC with possible RBC transfusion 2x/week  - Transfuse 1 unit PRBCs for hemoglobin 7.0-8 if symptomatic, 2 units for hemoglobin < 7.   - Transfuse 1 unit platelets for platelet count <10k or serious bleeding.  - Will administer 2 units PRBC and 1 unit Plt today- will do post-platelet count after 1 unit Plt     #Frailty- attempts for home care evaluation have been delayed, Sue dang RN will " follow-up. Not addressed today     #Chronic kidney disease- no significant change, Crt 1.18 today and GFR 47     #Subdural hematoma and subarachnoid hemorrhage after a fall- she had a DVT a couple of years ago. Risk of bleeding while on anticoagulation outweighs risk of thrombosis in view of significant chronic thrombocytopenia.  -No apixaban or other anticoagulant     #Driving-   recommend against driving-hearing, strength, reaction time not adequate     #CODE STATUS-  DO NOT RESUSCITATE order was placed in chart 1/11/2023.        63 minutes spent on the date of the encounter doing chart review, review of test results, interpretation of tests, patient visit and documentation     KHADRA Kauffman CNP

## 2023-07-14 NOTE — PATIENT INSTRUCTIONS
Bonny here is the information I discussed with you today:    I would recommend you getting labs and blood transfusions twice a week.  You platelet count has been critically low and you are at risk for spontaneous bleeding.  Your red blood cells have also been critically low and this is contributing to why you feel so poorly- short of breath, fatigued, dizzy, light-headed. I am concerned you are at risk for falls due to having a low blood pressure and feeling light-headed.  If you fall you are at risk for significant bleeding because your platelet count is so low. If you would come in twice a week for labs and transfusion this could help you feel better.     We could schedule you for labs and transfusions at [facility] but unfortunately [facility] does not do the type and screen the same day and the transfusion.  If you wanted labs and transfusions at [facility] you would need to go for labs on Monday and get a transfusions Tuesday and do labs again on Thursday and a transfusion on Friday.    If you came here to the [facility] we would schedule you for labs and transfusions on Tuesday and Friday or Mondays and Thursdays, whichever you would prefer.    I will try and get you scheduled for labs and transfusions this coming Friday 7/21 here at the [facility].  I will work on getting you scheduled for your Azacitidine at [facility] 7/24-7/28 and have you see a provider at the [facility].  I will get you scheduled with Dr. Spann for her soonest available appointment.    Please call [phone] and ask for Sue Marquez RN with questions or concerns.

## 2023-07-21 NOTE — PROGRESS NOTES
Infusion Nursing Note:  Bonny Raymundo presents today for 1 unit platelets, 1 unit PRBCs.    Patient seen by provider today: No   present during visit today: Not Applicable.    Note: Bonny presents today feeling overall okay. Denies pain or nausea/vomiting. Denies fevers/chills. Endorses mild OCASIO, unchanged from baseline. Denies cough, chest pain, or dizziness/lightheadedness. Endorses alternating constipation/diarrhea, but manageable at home. Denies urinary issues. Denies neuropathy. Offers no new concerns at this appointment.      Intravenous Access:  Peripheral IV placed in lab.    Treatment Conditions:     Latest Reference Range & Units 07/21/23 08:24   Sodium 136 - 145 mmol/L 137   Potassium 3.4 - 5.3 mmol/L 4.3   Chloride 98 - 107 mmol/L 104   Carbon Dioxide (CO2) 22 - 29 mmol/L 24   Urea Nitrogen 8.0 - 23.0 mg/dL 30.8 (H)   Creatinine 0.51 - 0.95 mg/dL 1.11 (H)   GFR Estimate >60 mL/min/1.73m2 50 (L)   Calcium 8.8 - 10.2 mg/dL 9.0   Anion Gap 7 - 15 mmol/L 9   Albumin 3.5 - 5.2 g/dL 2.9 (L)   Protein Total 6.4 - 8.3 g/dL 7.5   Alkaline Phosphatase 35 - 104 U/L 136 (H)   ALT 0 - 50 U/L <5   AST 0 - 45 U/L 7   Bilirubin Total <=1.2 mg/dL 1.1   Glucose 70 - 99 mg/dL 140 (H)   WBC 4.0 - 11.0 10e3/uL 0.9 (LL)   Hemoglobin 11.7 - 15.7 g/dL 7.1 (L)   Hematocrit 35.0 - 47.0 % 22.3 (L)   Platelet Count 150 - 450 10e3/uL <2 (LL)   RBC Count 3.80 - 5.20 10e6/uL 2.30 (L)   MCV 78 - 100 fL 97   MCH 26.5 - 33.0 pg 30.9   MCHC 31.5 - 36.5 g/dL 31.8   RDW 10.0 - 15.0 % 13.8   % Neutrophils % 11   % Lymphocytes % 78   % Monocytes % 8   % Eosinophils % 1   % Basophils % 1   % Blasts % 1   Absolute Basophils 0.0 - 0.2 10e3/uL 0.0   Absolute Neutrophil 1.6 - 8.3 10e3/uL 0.1 (LL)   Absolute Lymphocytes 0.8 - 5.3 10e3/uL 0.7 (L)   Absolute Monocytes 0.0 - 1.3 10e3/uL 0.1   Absolute Eosinophils 0.0 - 0.7 10e3/uL 0.0   Absolute Blasts <=0.0 10e3/uL 0.0   RBC Morphology  Confirmed RBC Indices   Platelet Morphology Automated  Count Confirmed. Platelet morphology is normal.  Automated Count Confirmed. Platelet morphology is normal.   Acanthocytes None Seen  Slight !   ABO/Rh(D)  A POS   Antibody Screen Negative  Negative   SPECIMEN EXPIRATION DATE  [id number]     Results reviewed, labs MET treatment parameters: Hgb 7.1-8, Plts <10K.  Blood transfusion consent signed 01/11/23.      Post Infusion Assessment:  Patient tolerated infusion without incident.  Blood return noted pre and post infusion.  Site patent and intact, free from redness, edema or discomfort.  No evidence of extravasations.  Access discontinued per protocol.       Discharge Plan:   Patient declined prescription refills.  Discharge instructions reviewed with: Patient.  Patient and/or family verbalized understanding of discharge instructions and all questions answered.  AVS to patient via [employer].  Patient will return 07/24 for next infusion appointment.   Patient discharged in stable condition accompanied by: self.  Departure Mode: Ambulatory.      Tracy Busch RN

## 2023-07-21 NOTE — PATIENT INSTRUCTIONS
[facility] Triage and after hours / weekends / holidays:  [phone]    Please call the triage or after hours line if you experience a temperature greater than or equal to 100.4, shaking chills, have uncontrolled nausea, vomiting and/or diarrhea, dizziness, shortness of breath, chest pain, bleeding, unexplained bruising, or if you have any other new/concerning symptoms, questions or concerns.      If you are having any concerning symptoms or wish to speak to a provider before your next infusion visit, please call your care coordinator or triage to notify them so we can adequately serve you.     If you need a refill on a narcotic prescription or other medication, please call before your infusion appointment.

## 2023-07-21 NOTE — NURSING NOTE
Chief Complaint   Patient presents with     Blood Draw     Labs drawn via PIV by RN in lab.  VS taken        Labs drawn from PIV placed by RN. Line flushed with saline. Vitals taken. Pt checked in for appointment(s).    Julia Lugo RN

## 2023-07-24 NOTE — PATIENT INSTRUCTIONS
Bonny,     We would like you to return to the clinic tomorrow, 7/25, at 1:30 pm for 1 unit of blood. Your hemoglobin today was 7.4. If you are unable to make this appointment time, please give us a call at [phone] option 5, option 2 so we can work to get you in for a unit of blood.    Thanks!        [facility] Triage and after hours / weekends / holidays:  [phone] option 5, option 2    Please call the triage or after hours line if you experience a temperature greater than or equal to 100.4, shaking chills, have uncontrolled nausea, vomiting and/or diarrhea, dizziness, shortness of breath, chest pain, bleeding, unexplained bruising, or if you have any other new/concerning symptoms, questions or concerns.      If you are having any concerning symptoms or wish to speak to a provider before your next infusion visit, please call triage to notify your care team so we can adequately serve you.     If you need a refill on a narcotic prescription or other medication, please call before your infusion appointment.

## 2023-07-24 NOTE — PROGRESS NOTES
Infusion Nursing Note:  Bonny Raymundo presents today for C3D1 Vidaza.    Patient seen by provider today: No   present during visit today: Not Applicable.    Note: Bonny denied fevers, chills, chest pain, and cough. She reports ongoing OCASIO and fatigue that she feels is the same as it has been for awhile now. Denied lightheaded/dizziness or signs/symptoms of bleeding.    She confirmed that she took zofran prior to coming to clinic today.    Per written communication with Jing Dillon CNP/Ebony Delgado, RN 07/24/23 @ 1510  - will defer vidaza 2 weeks -- I will send scheduling request  - spoke to Dr. Spann, will not give neupogen  - give 2 units platelets today and patient can return tomorrow for 1 unit pRBCs  - sending script for acyclovir    IB sent to RN[facility] to follow up to make sure schedule is adjusted correctly and to follow up to see if patient has coverage for labs/chemo to be given at home through [facility].    IB sent to scheduling to reschedule patient for 1 unit pRBCs for tomorrow at 1330.    IB sent to scheduling to schedule patient for twice weekly possible transfusions on Tuesdays/Fridays preferably at [facility] through middle of September.     Intravenous Access:  Labs drawn without difficulty.    Treatment Conditions:  Lab Results   Component Value Date    HGB 7.4 (L) 07/24/2023    WBC 1.3 (L) 07/24/2023    ANEU 0.1 (LL) 07/24/2023    ANEUTAUTO 1.3 (L) 09/12/2022    PLT 3 (LL) 07/24/2023        Lab Results   Component Value Date     07/24/2023    POTASSIUM 4.2 07/24/2023    MAG 1.8 06/02/2022    CR 1.15 (H) 07/24/2023    LEO 8.8 07/24/2023    BILITOTAL 0.8 07/24/2023    ALBUMIN 2.9 (L) 07/24/2023    ALT <5 07/24/2023    AST 6 07/24/2023       Blood transfusion consent signed 10/5/22.      Post Infusion Assessment:  Patient tolerated infusion without incident.  Blood return noted pre and post infusion.  Site patent and intact, free from redness, edema or discomfort.  No evidence of  extravasations.  Access discontinued per protocol.       Discharge Plan:   Prescription refills given for acyclovir.  Patient and/or family verbalized understanding of discharge instructions and all questions answered.  Copy of AVS reviewed with patient and/or family.  Patient will return 7/25 for next appointment.  Patient discharged in stable condition accompanied by: self.  Departure Mode: Ambulatory.      Graciela Delgado RN

## 2023-07-25 NOTE — PROGRESS NOTES
Infusion Nursing Note:  Bonny Raymundo presents today for 1 unit RBC.    Patient seen by provider today: No    Note: Pt presented to clinic with continue c/o feeling fatigued, hoping today's infusion will improve how she is feeling.  Denies s/s bleeding.  Administering 1 unit RBC based on yesterday's labs per yesterday's documentation. Pt did not request or require any intervention for pain today.    Intravenous Access:  Peripheral IV placed.    Treatment Conditions:  Lab Results   Component Value Date    HGB 7.4 (L) 07/24/2023    WBC 1.3 (L) 07/24/2023    ANEU 0.1 (LL) 07/24/2023    ANEUTAUTO 1.3 (L) 09/12/2022    PLT 3 (LL) 07/24/2023        Results reviewed, labs MET treatment parameters, ok to proceed with treatment.  Blood transfusion consent signed 1/11/23.    Post Infusion Assessment:  Patient tolerated infusion without incident.  Report given to Karlee HAILE RN, to complete RBC transfusion and discharge.  Blood return noted pre and post infusion.  Site patent and intact, free from redness, edema or discomfort.  No evidence of extravasations.  Access discontinued per protocol.    Discharge Plan:   Patient declined prescription refills.  Discharge instructions reviewed with: Patient.  AVS to patient via [employer].  Patient will return 7/27/2023 for next appointment.   Patient discharged in stable condition accompanied by: self.  Departure Mode: Ambulatory.    Natalie Franco RN

## 2023-07-25 NOTE — PATIENT INSTRUCTIONS
Contact Numbers    [facility] Main Line/TRIAGE: [phone]    Call with chills and/or temperature greater than or equal to 100.5, uncontrolled nausea/vomiting, diarrhea, constipation, dizziness, shortness of breath, chest pain, bleeding, unexplained bruising, or any new/concerning symptoms, questions/concerns.     If you are having any concerning symptoms or wish to speak to a provider before your next infusion visit, please call your care coordinator or triage to notify them so we can adequately serve you.       After Hours: [phone]    If after hours, weekends, or holidays, call main hospital  and ask for Oncology doctor on call.      July 2023 Sunday Monday Tuesday Wednesday Thursday Friday Saturday                                 1       2     3     4     5     6    LAB PERIPHERAL   8:00 AM   (15 min.)    [facility] DRAW   [facility]    SPEC INFUSION 3 HR (180 MIN)   8:30 AM   (180 min.)    [facility] INFUSION NURSE   [facility] Treatment [facility] 7     8       9     10     11     12     13     14    LAB PERIPHERAL   7:30 AM   (15 min.)    [facility] DRAW   [facility]    RETURN CCSL   8:15 AM   (45 min.)   Jing Dillon, KHADRA CNP   [facility]    ONC INFUSION 3 HR (180 MIN)   8:30 AM   (180 min.)    ONC INFUSION NURSE   [facility] 15       16     17     18     19     20     21    LAB PERIPHERAL   7:15 AM   (15 min.)   [facility]   [facility]    ONC INFUSION 3 HR (180 MIN)   7:30 AM   (180 min.)   [facility] ON[facility] INFUSION NURSE   [facility] 22       23     24    LAB CENTRAL   2:00 PM   (15 min.)   [facility] D[facility]   [facility]    ONC INFUSION 1 HR (60 MIN)   2:30 PM   (60 min.)   [facility] ON[facility] INFUSION NURSE   [facility] 25    ONC INFUSION 1 HR (60  MIN)   1:30 PM   (60 min.)   [facility] ON[facility] INFUSION NURSE   [facility] 26     27    LAB PERIPHERAL   1:00 PM   (15 min.)    [facility] DRAW   [facility]    ONC INFUSION 3 HR (180 MIN)   1:30 PM   (180 min.)   [facility] ON[facility] INFUSION NURSE   [facility] 28     29       30     31 August 2023 Sunday Monday Tuesday Wednesday Thursday Friday Saturday             1    LAB PERIPHERAL  11:30 AM   (15 min.)    [facility] DRAW   [facility]    ONC INFUSION 3 HR (180 MIN)  12:00 PM   (180 min.)   [facility] ON[facility] INFUSION NURSE   [facility] 2     3     4    LAB PERIPHERAL   7:45 AM   (15 min.)   [facility] DRAW   [facility]    ONC INFUSION 3 HR (180 MIN)   8:30 AM   (180 min.)    ONC INFUSION NURSE   [facility] 5       6     7    RETURN CCSL  11:00 AM   (45 min.)   Jyoti Taylor APRN CNP   [facility] 8     9    ONC INFUSION 1 HR (60 MIN)  11:00 AM   (60 min.)   [facility] ON[facility] INFUSION NURSE   [facility] 10     11     12       13     14     15    ONC INFUSION 3 HR (180 MIN)  12:00 PM   (180 min.)    ONC INFUSION NURSE   [facility] 16     17     18     19       20     21     22     23     24     25     26       27     28     29     30     31                                 Lab Results:  Recent Results (from the past 12 hour(s))   Prepare red blood cells (unit)    Collection Time: 07/25/23  6:34 AM   Result Value Ref Range    Blood Component Type Red Blood Cells     Product Code O3382E78     Unit Status Transfused     Unit Number [id number]     CROSSMATCH Compatible     CODING SYSTEM JXRA362     ISSUE DATE AND TIME [id number]     UNIT ABO/RH A+     UNIT TYPE ISBT 6200    Prepare red blood cells (unit)     Collection Time: 07/25/23  6:34 AM   Result Value Ref Range    Blood Component Type Red Blood Cells     Product Code V2815S25     Unit Status Ready for issue     Unit Number [id number]     CROSSMATCH Compatible     CODING SYSTEM KFIR025

## 2023-07-25 NOTE — PROGRESS NOTES
Therapy: Azacitidine  Insurance: [facility] [employer]    Patient does not have coverage for Azacitidine in the home with their [facility] [employer] plan due to not a Medicare covered drug in the home setting. Drug will be billed to the Part D plan (these are typically very high copays) and supplies will be self pay. Auth must be obtained before dispensed.     [facility] self pay cost for RN visits is $90 per visit due to no coverage for nursing with [facility] as well.    [facility] in reference to 07/25/2023 benefit check for Azacitidine.    Please contact Intake with any questions, [phone] or In Basket pool, [facility] ([phone]).

## 2023-07-27 NOTE — PROGRESS NOTES
Infusion Nursing Note:  Bonny Raymundo presents today for 1 unit PRBC and 1 dose platelets.    Patient seen by provider today: No   present during visit today: Not Applicable.    Note: Patient arrives with no new concerns/complaints. She continues to have dizziness upon waking in the morning, denies any falls. She denies any abnormal bleeding or worsening shortness of breath or weakness. She has a corn on her second toe on her left foot that is painful. She has seen podiatry for this and plans to make a follow up visit. She denies any signs/symptoms of infection.    Patient is not scheduled to return until Tuesday 8/1 for possible blood/platelets  SHIRA Dillon CNP/Leola Cantrell RN  -see if patient is willing to come in this weekend    [facility] opening at 7 am- pt does not like early appointments but is willing to come Sunday at 7 am. Type and Screen drawn today.      Intravenous Access:  Peripheral IV placed.    Treatment Conditions:  Lab Results   Component Value Date    HGB 7.6 (L) 07/27/2023    WBC 1.2 (L) 07/27/2023    ANEU 0.1 (LL) 07/27/2023    ANEUTAUTO 1.3 (L) 09/12/2022    PLT 8 (LL) 07/27/2023        Blood transfusion consent signed 1/11/23.      Post Infusion Assessment:  Patient tolerated infusion without incident.  Blood return noted pre and post infusion.  Site patent and intact, free from redness, edema or discomfort.  No evidence of extravasations.  Access discontinued per protocol.       Discharge Plan:   Patient declined prescription refills.  Discharge instructions reviewed with: Patient.  Patient and/or family verbalized understanding of discharge instructions and all questions answered.  AVS to patient via [employer].  Patient will return 7/30/23 for possible transfusion for next appointment.   Patient discharged in stable condition accompanied by: self.  Departure Mode: Ambulatory.      Leola Cantrell RN

## 2023-07-27 NOTE — NURSING NOTE
Chief Complaint   Patient presents with    Blood Draw     IV placement with blood draw by lab RN. Vitals taken and appointment arrived       aLisha Shepard RN

## 2023-07-27 NOTE — PATIENT INSTRUCTIONS
[facility] Triage and after hours / weekends / holidays:  [phone]    Please call the triage or after hours line if you experience a temperature greater than or equal to 100.4, shaking chills, have uncontrolled nausea, vomiting and/or diarrhea, dizziness, shortness of breath, chest pain, bleeding, unexplained bruising, or if you have any other new/concerning symptoms, questions or concerns.      If you are having any concerning symptoms or wish to speak to a provider before your next infusion visit, please call triage to notify them so we can adequately serve you.     If you need a refill on a narcotic prescription or other medication, please call before your infusion appointment.                July 2023 Sunday Monday Tuesday Wednesday Thursday Friday Saturday                                 1       2     3     4     5     6    LAB PERIPHERAL   8:00 AM   (15 min.)    [facility] DRAW   [facility]    SPEC INFUSION 3 HR (180 MIN)   8:30 AM   (180 min.)   [facility] INFUSION NURSE   [facility] 7     8       9     10     11     12     13     14    LAB PERIPHERAL   7:30 AM   (15 min.)   [facility]   [facility]    RETURN CCSL   8:15 AM   (45 min.)   Jing Dlilon APRN CNP   [facility]    ONC INFUSION 3 HR (180 MIN)   8:30 AM   (180 min.)    ONC INFUSION NURSE   [facility] 15       16     17     18     19     20     21    LAB PERIPHERAL   7:15 AM   (15 min.)   [facility]   [facility]    ONC INFUSION 3 HR (180 MIN)   7:30 AM   (180 min.)   [facility] INFUSION NURSE   [facility] 22       23     24    LAB CENTRAL   2:00 PM   (15 min.)   [facility]   [facility]    ONC INFUSION 1 HR (60 MIN)   2:30 PM   (60 min.)   [facility] INFUSION NURSE   [facility]  [facility] 25    ONC INFUSION 1 HR (60 MIN)   1:30 PM   (60 min.)   [facility] ON[facility] INFUSION NURSE   [facility] 26     27    LAB PERIPHERAL   1:00 PM   (15 min.)   [facility]RAW   [facility]    ONC INFUSION 3 HR (180 MIN)   1:30 PM   (180 min.)   [facility] ON[facility] INFUSION NURSE   [facility] 28     29 30 31 August 2023 Sunday Monday Tuesday Wednesday Thursday Friday Saturday             1    LAB PERIPHERAL  11:30 AM   (15 min.)   [facility]   [facility]    ONC INFUSION 3 HR (180 MIN)  12:00 PM   (180 min.)   [facility] ON[facility] INFUSION NURSE   [facility] 2     3     4    LAB PERIPHERAL   7:45 AM   (15 min.)   [facility]   [facility]    ONC INFUSION 3 HR (180 MIN)   8:30 AM   (180 min.)   [facility] ONC INFUSION NURSE   [facility] 5       6     7    RETURN CCSL  12:15 PM   (45 min.)   Jaqui Dan PA-C   [facility] 8     9    ONC INFUSION 1 HR (60 MIN)  11:00 AM   (60 min.)   [facility] ONC INFUSION NURSE   [facility] 10     11     12       13     14     15    ONC INFUSION 3 HR (180 MIN)  12:00 PM   (180 min.)   [facility] ON[facility] INFUSION NURSE   [facility] 16     17     18     19       20     21     22     23     24     25     26       27     28     29     30     31                                 Lab Results:  Recent Results (from the past 12 hour(s))   CBC with platelets and differential    Collection Time: 07/27/23  1:04 PM   Result Value Ref Range    WBC Count 1.2 (L) 4.0 - 11.0 10e3/uL    RBC Count 2.51 (L) 3.80 - 5.20 10e6/uL    Hemoglobin 7.6 (L) 11.7 - 15.7 g/dL    Hematocrit 23.9 (L) 35.0 - 47.0 %    MCV 95 78 - 100 fL    MCH 30.3 26.5 - 33.0 pg    MCHC 31.8 31.5 - 36.5 g/dL     RDW 14.5 10.0 - 15.0 %    Platelet Count 8 (LL) 150 - 450 10e3/uL   Manual Differential    Collection Time: 07/27/23  1:04 PM   Result Value Ref Range    % Neutrophils 10 %    % Lymphocytes 76 %    % Monocytes 10 %    % Eosinophils 0 %    % Basophils 0 %    % Blasts 4 %    Absolute Neutrophils 0.1 (LL) 1.6 - 8.3 10e3/uL    Absolute Lymphocytes 0.9 0.8 - 5.3 10e3/uL    Absolute Monocytes 0.1 0.0 - 1.3 10e3/uL    Absolute Eosinophils 0.0 0.0 - 0.7 10e3/uL    Absolute Basophils 0.0 0.0 - 0.2 10e3/uL    Absolute Blasts 0.0 <=0.0 10e3/uL    RBC Morphology Confirmed RBC Indices     Platelet Assessment  Automated Count Confirmed. Platelet morphology is normal.     Automated Count Confirmed. Platelet morphology is normal.    Quang Cells Slight (A) None Seen    RBC Fragments Slight (A) None Seen

## 2023-07-30 NOTE — PROGRESS NOTES
Infusion Nursing Note:  Bonny Raymundo presents today for labs and Platelet transfusion       Note:   Pt denies any new or acute issues today. Reports she continues to have mild lightheadedness when first gets up in the morning which is not worsening. She drinks about 3-4 five ounce glasses of fluids per day. No active s/s of bleeding or headaches, SOB, increased fatigue, or palpitations. Pt reports she has never had an issue during or after a transfusion.     Intravenous Access:  Peripheral IV placed.    Treatment Conditions:  Lab Results   Component Value Date    HGB 8.3 (L) 07/30/2023    WBC 1.2 (L) 07/30/2023    ANEU 0.1 (LL) 07/27/2023    ANEUTAUTO 1.3 (L) 09/12/2022    PLT 9 (LL) 07/30/2023   Consent signed 1/11/23  Transfuse 1 unit for hbg 7.1-8.0 and two units for less than 7.  Transfuse 1 pack platelets for less than 10k.       Post Infusion Assessment:  Patient tolerated infusion without incident.  Blood return noted pre and post infusion.  No evidence of extravasations.  Access discontinued per protocol.       Discharge Plan:   Patient will return 8/1/2023 for next appointment.  Departure Mode: Ambulatory.      Eboni Atkinson RN

## 2023-08-01 NOTE — NURSING NOTE
Chief Complaint   Patient presents with    Blood Draw     Labs drawn via PIV placed by RN. VS taken.     Labs drawn from PIV placed by RN. Line flushed with saline. Vitals taken. Pt checked in for appointment(s).     Destini Yung RN

## 2023-08-01 NOTE — PROGRESS NOTES
Infusion Nursing Note:  Bonny Raymundo presents today for labs and 1 unit PRBC transfusion.    Patient seen by provider today: No   present during visit today: Not Applicable.    Note: pt denies any new issues or concerns today. No fevers/chills, cough, sob, or headaches.  She is taking Levaquin and acyclovir as prescribed. No active s/s of bleeding. Pt meets parameters for a RBC transfusion.       Intravenous Access:  Peripheral IV placed.    Treatment Conditions:  Lab Results   Component Value Date    HGB 7.7 (L) 08/01/2023    WBC 1.4 (L) 08/01/2023    ANEU 0.1 (LL) 07/30/2023    ANEUTAUTO 1.3 (L) 09/12/2022    PLT 17 (LL) 08/01/2023      Blood consent signed 1/2023    Orders to transfuse 1 unit PRBC if hgb is 7.1-8.0    Order to transfuse 1 unit platelet if less than 10k      Post Infusion Assessment:  Patient tolerated infusion without incident.  Blood return noted pre and post infusion.  No evidence of extravasations.  Access discontinued per protocol.       Discharge Plan:   Discharge instructions reviewed with: Patient.    Patient will return is waiting for labs/possible transfusion for 8/4 to be scheduled. She is currently on the wait list for [facility]. She prefers [facility].     Departure Mode: Ambulatory.      Eboni Atkinson RN

## 2023-08-02 NOTE — PROGRESS NOTES
ASSESSMENT:  Encounter Diagnoses   Name Primary?    Toe pain, left Yes    Corn of toe     Hammer toe of left foot        MEDICAL DECISION MAKING:  Painful hyperkeratotic lesion, distal left second toe.  No ulceration.  I reviewed how the skin lesion is secondary to the toe contracture and high pressure.  Without ulceration, trimming the lesion would require her to sign an ABN waiver form.  This was discussed.  She elected to forego any paring of the lesion today.    Recommendations discussed:  She is to file the lesion down with a pumice stone  I would like her to try a larger crest pad for additional elevation  I suggest she place the pad around the third toe  I mentioned the option of elective toe surgery.    Follow-up on an as-needed basis.    She also reported recent pain in her right second toe.  On exam, it appears that the nail was trimmed too short and likely this was the source of her pain.  No callus or ulceration    Disclaimer: This note consists of symbols derived from keyboarding, dictation and/or voice recognition software. As a result, there may be errors in the script that have gone undetected. Please consider this when interpreting information found in this chart.    Garrett Feliciano DPM, FACFAS, MS    [facility] Department of Podiatry/Foot & Ankle Surgery      ____________________________________________________________________    HPI:       Bonny returns with pain in her left second toe related to a corn.  I last evaluated her 5/9/2023.  The lesion was pared down and the nucleus cored out revealing a superficial ulceration at that time.    We also discussed shoes that accommodate her toe deformity as well as using a crest pad.  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    Sensorineural hearing loss, unspecified     Sprain of ankle, unspecified site     L   *  *  Past Surgical History:   Procedure Laterality Date    ARTHRODESIS FOOT  11    Hallux valgus R-1st MP joint    BLEPHAROPLASTY BILATERAL  ,     BONE MARROW BIOPSY, BONE SPECIMEN, NEEDLE/TROCAR N/A 2016    Procedure: BIOPSY BONE MARROW;  Surgeon: Mu Morgan MD;  Location:  GI    BONE MARROW BIOPSY, BONE SPECIMEN, NEEDLE/TROCAR N/A 2016    Procedure: BIOPSY BONE MARROW;  Surgeon: Mu Morgan MD;  Location:  GI    C DEXA INTERPRETATION, AXIAL  03    CATARACT IOL, RT/LT Right     CATARACT IOL, RT/LT Left     COLONOSCOPY N/A 2018    Procedure: COLONOSCOPY;  colonoscopy;  Surgeon: Meliton Castrejon MD;  Location:  GI    EXCHANGE INTRAOCULAR LENS IMPLANT Right 2015    Procedure: EXCHANGE INTRAOCULAR LENS IMPLANT;  Surgeon: Garrett Dawson MD;  Location:  EC    PICC INSERTION Left 2017    5fr DL BioFlo PICC, 42cm (2cm external) in the L basilic vein w/ tip in the  SVC RA junction.    VITRECTOMY PARSPLANA WITH 23 GAUGE SYSTEM Right 2015    Procedure: VITRECTOMY PARSPLANA WITH 23 GAUGE SYSTEM;  Surgeon: Racheal Loyd MD;  Location:  EC    ZZC NONSPECIFIC PROCEDURE          ZZ NONSPECIFIC PROCEDURE      hysterectomy/BSO    ZC NONSPECIFIC PROCEDURE      myomectomy (fibroids)    [facility] COLONOSCOPY THRU STOMA, DIAGNOSTIC      normal- minimal diverticulosis   *  *  Current Outpatient Medications   Medication Sig Dispense Refill    Acetaminophen (TYLENOL EX ST ARTHRITIS PAIN PO) Take 650 mg by mouth every 8 hours as needed Twice daily      acyclovir (ZOVIRAX) 400 MG tablet Take 1 tablet (400 mg) by mouth 2 times daily 60  tablet 3    CALCIUM PO Take 1 tablet by mouth daily      diphenhydrAMINE (BENADRYL) 25 MG tablet Take 25 mg by mouth nightly as needed for sleep 56 tablet     levofloxacin (LEVAQUIN) 250 MG tablet Take 1 tablet (250 mg) by mouth daily 30 tablet 0    multivitamin w/minerals (THERA-VIT-M) tablet Take 1 tablet by mouth daily      ondansetron (ZOFRAN) 8 MG tablet Take 1 tab (8 mg) by mouth prior to azacitidine, then 1 tab every 8 hours as needed for nausea. 30 tablet 5    pantoprazole (PROTONIX) 20 MG EC tablet TAKE 1 TABLET BY MOUTH EVERY DAY (Patient taking differently: Take 20 mg by mouth daily) 90 tablet 11    UNABLE TO FIND Take by mouth daily MEDICATION NAME: Vitamin K      zinc oxide (DESITIN) 40 % external ointment Apply topically as needed for irritation 56 g 0         EXAM:    Vitals: /82   Ht 1.524 m (5')   Wt 44.9 kg (99 lb)   BMI 19.33 kg/m    BMI: Body mass index is 19.33 kg/m .    Constitutional:  Bonny Raymundo is in no apparent distress, appears well-nourished.  Cooperative with history and physical exam.    Vascular:  Pedal pulses are palpable for both the DP and PT arteries.  CFT < 3 sec.  No edema.        Neuro: Light touch sensation is intact to the L4, L5, S1 distributions  No evidence of weakness, spasticity, or contracture in the lower extremities.      Derm: Normal texture and turgor.  No erythema, ecchymosis, or cyanosis.  No open lesions.   There is a small, nucleated hyperkeratotic lesion at the distal aspect of the left second toe.    No evidence of intraepidermal bleeding to suggest underlying ulceration.     Musculoskeletal:   Severe pes planus, left greater than right.  Complete collapse of the medial longitudinal arch.  Digital contractures.  The left second toe is largely reducible with some rigidity at the distal interphalangeal joint.

## 2023-08-02 NOTE — LETTER
8/2/2023         RE: Bonny Raymundo  [address]  [facility] [id number]        Dear Colleague,    Thank you for referring your patient, Bonny Raymundo, to the [facility]. Please see a copy of my visit note below.    ASSESSMENT:  Encounter Diagnoses   Name Primary?     Toe pain, left Yes     Corn of toe      Hammer toe of left foot        MEDICAL DECISION MAKING:  Painful hyperkeratotic lesion, distal left second toe.  No ulceration.  I reviewed how the skin lesion is secondary to the toe contracture and high pressure.  Without ulceration, trimming the lesion would require her to sign an ABN waiver form.  This was discussed.  She elected to forego any paring of the lesion today.    Recommendations discussed:  She is to file the lesion down with a pumice stone  I would like her to try a larger crest pad for additional elevation  I suggest she place the pad around the third toe  I mentioned the option of elective toe surgery.    Follow-up on an as-needed basis.    She also reported recent pain in her right second toe.  On exam, it appears that the nail was trimmed too short and likely this was the source of her pain.  No callus or ulceration    Disclaimer: This note consists of symbols derived from keyboarding, dictation and/or voice recognition software. As a result, there may be errors in the script that have gone undetected. Please consider this when interpreting information found in this chart.    Garrett Feliciano DPM, FACFAS, [facility] Department of Podiatry/Foot & Ankle Surgery      ____________________________________________________________________    HPI:       Bonny returns with pain in her left second toe related to a corn.  I last evaluated her 5/9/2023.  The lesion was pared down and the nucleus cored out revealing a superficial ulceration at that time.    We also discussed shoes that accommodate her toe deformity as well as using a crest pad.  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     Sensorineural hearing loss, unspecified      Sprain of ankle, unspecified site     L   *  *  Past Surgical History:   Procedure Laterality Date     ARTHRODESIS FOOT  11    Hallux valgus R-1st MP joint     BLEPHAROPLASTY BILATERAL  ,      BONE MARROW BIOPSY, BONE SPECIMEN, NEEDLE/TROCAR N/A 2016    Procedure: BIOPSY BONE MARROW;  Surgeon: Mu Morgan MD;  Location:  GI     BONE MARROW BIOPSY, BONE SPECIMEN, NEEDLE/TROCAR N/A 2016    Procedure: BIOPSY BONE MARROW;  Surgeon: Mu Morgan MD;  Location:  GI     C DEXA INTERPRETATION, AXIAL  03     CATARACT IOL, RT/LT Right      CATARACT IOL, RT/LT Left      COLONOSCOPY N/A 2018    Procedure: COLONOSCOPY;  colonoscopy;  Surgeon: Meliton Castrejon MD;  Location:  GI     EXCHANGE INTRAOCULAR LENS IMPLANT Right 2015    Procedure: EXCHANGE INTRAOCULAR LENS IMPLANT;  Surgeon: Garrett Dawson MD;  Location:  EC     PICC INSERTION Left 2017    5fr DL BioFlo PICC, 42cm (2cm external) in the L basilic vein w/ tip in the  SVC RA junction.     VITRECTOMY PARSPLANA WITH 23 GAUGE SYSTEM Right 2015    Procedure: VITRECTOMY PARSPLANA WITH 23 GAUGE SYSTEM;  Surgeon: Racheal Loyd MD;  Location:  EC     ZZC NONSPECIFIC PROCEDURE           ZZC NONSPECIFIC PROCEDURE      hysterectomy/BSO     ZZC NONSPECIFIC PROCEDURE      myomectomy (fibroids)     [facility] COLONOSCOPY THRU STOMA, DIAGNOSTIC      normal-  minimal diverticulosis   *  *  Current Outpatient Medications   Medication Sig Dispense Refill     Acetaminophen (TYLENOL EX ST ARTHRITIS PAIN PO) Take 650 mg by mouth every 8 hours as needed Twice daily       acyclovir (ZOVIRAX) 400 MG tablet Take 1 tablet (400 mg) by mouth 2 times daily 60 tablet 3     CALCIUM PO Take 1 tablet by mouth daily       diphenhydrAMINE (BENADRYL) 25 MG tablet Take 25 mg by mouth nightly as needed for sleep 56 tablet      levofloxacin (LEVAQUIN) 250 MG tablet Take 1 tablet (250 mg) by mouth daily 30 tablet 0     multivitamin w/minerals (THERA-VIT-M) tablet Take 1 tablet by mouth daily       ondansetron (ZOFRAN) 8 MG tablet Take 1 tab (8 mg) by mouth prior to azacitidine, then 1 tab every 8 hours as needed for nausea. 30 tablet 5     pantoprazole (PROTONIX) 20 MG EC tablet TAKE 1 TABLET BY MOUTH EVERY DAY (Patient taking differently: Take 20 mg by mouth daily) 90 tablet 11     UNABLE TO FIND Take by mouth daily MEDICATION NAME: Vitamin K       zinc oxide (DESITIN) 40 % external ointment Apply topically as needed for irritation 56 g 0         EXAM:    Vitals: /82   Ht 1.524 m (5')   Wt 44.9 kg (99 lb)   BMI 19.33 kg/m    BMI: Body mass index is 19.33 kg/m .    Constitutional:  Bonny Raymundo is in no apparent distress, appears well-nourished.  Cooperative with history and physical exam.    Vascular:  Pedal pulses are palpable for both the DP and PT arteries.  CFT < 3 sec.  No edema.        Neuro: Light touch sensation is intact to the L4, L5, S1 distributions  No evidence of weakness, spasticity, or contracture in the lower extremities.      Derm: Normal texture and turgor.  No erythema, ecchymosis, or cyanosis.  No open lesions.   There is a small, nucleated hyperkeratotic lesion at the distal aspect of the left second toe.    No evidence of intraepidermal bleeding to suggest underlying ulceration.     Musculoskeletal:   Severe pes planus, left greater than right.  Complete  collapse of the medial longitudinal arch.  Digital contractures.  The left second toe is largely reducible with some rigidity at the distal interphalangeal joint.         Again, thank you for allowing me to participate in the care of your patient.        Sincerely,        Garrett Feliciano DPM

## 2023-08-02 NOTE — PATIENT INSTRUCTIONS
Thank you for choosing [facility]!    DR. JERNIGAN'S CLINIC LOCATIONS:     [facility] TRIAGE LINE: [phone]   [address] APPOINTMENTS: [phone]   [place] [address] RADIOLOGY: [phone]   (Every other Tues - Wed - Fri PM) SET UP SURGERY: [phone]    PHYSICAL THERAPY: [phone]   [facility] BILLING QUESTIONS: [phone] [address] FAX: [phone]   [place] [address]    (Thurs & Fri AM)       Larger size crest pad   Pumice stone   Soaking feet is optional if it reduced pain

## 2023-08-04 NOTE — PROGRESS NOTES
Infusion Nursing Note:  Bonny Raymundo presents today for 2 units platelets.    Patient seen by provider today: No   present during visit today: Not Applicable.    Note:   Patient had some gum bleeding last night but had to use a different toothbrush. She denies dizziness, lightheadedness, fevers, chills, SOB, chest pain, nausea, and pain.    Platelet count 2. Jing Dillon CNP notified.    TORB: Jing Dillon CNP/Karin Chang RN at 1123 on 8/4/23: give 2 units platelets.    Patient reported nausea after platelet completion, declined interventions.    Intravenous Access:  Peripheral IV placed.    Treatment Conditions:  Lab Results   Component Value Date    HGB 8.4 (L) 08/04/2023    WBC 1.3 (L) 08/04/2023    ANEU 0.3 (LL) 08/04/2023    ANEUTAUTO 1.3 (L) 09/12/2022    PLT 2 (LL) 08/04/2023        Lab Results   Component Value Date     08/04/2023    POTASSIUM 4.5 08/04/2023    MAG 1.8 06/02/2022    CR 1.17 (H) 08/04/2023    LEO 9.2 08/04/2023    BILITOTAL 0.8 08/04/2023    ALBUMIN 3.1 (L) 08/04/2023    ALT <5 08/04/2023    AST 7 08/04/2023       Results reviewed, labs MET treatment parameters, ok to proceed with treatment: plts <10.  Results reviewed, labs did NOT meet treatment parameters: Hgb <8.  Blood transfusion consent signed 1/11/23.      Post Infusion Assessment:  Patient tolerated infusion without incident.  Blood return noted pre and post infusion.  Site patent and intact, free from redness, edema or discomfort.  No evidence of extravasations.  Access discontinued per protocol.       Discharge Plan:   Patient declined prescription refills.  Discharge instructions reviewed with: Patient.  Patient and/or family verbalized understanding of discharge instructions and all questions answered.  Copy of AVS reviewed with patient and/or family.  Patient will return 8/7 for next appointment.  Patient discharged in stable condition accompanied by: self.  Departure Mode: Ambulatory.      Kairn NELSON  Bernardo, RN

## 2023-08-04 NOTE — PATIENT INSTRUCTIONS
[facility] Triage and after hours / weekends / holidays:  [phone]    Please call the triage or after hours line if you experience a temperature greater than or equal to 100.5, shaking chills, have uncontrolled nausea, vomiting and/or diarrhea, dizziness, shortness of breath, chest pain, bleeding, unexplained bruising, or if you have any other new/concerning symptoms, questions or concerns.      If you are having any concerning symptoms or wish to speak to a provider before your next infusion visit, please call your care coordinator or triage to notify them so we can adequately serve you.     If you need a refill on a narcotic prescription or other medication, please call before your infusion appointment.

## 2023-08-04 NOTE — NURSING NOTE
Chief Complaint   Patient presents with    Blood Draw     Labs drawn via piv placed by EMT in lab. VS Taken.      Labs drawn from PIV placed by EMT. Line flushed with saline. Vitals taken. Pt checked in for appointment(s).    Roxi Melgar RN

## 2023-08-07 PROBLEM — N20.1 URETERAL STONE: Status: ACTIVE | Noted: 2023-01-01

## 2023-08-07 PROBLEM — N17.9 AKI (ACUTE KIDNEY INJURY) (H): Status: ACTIVE | Noted: 2023-01-01

## 2023-08-07 NOTE — PROGRESS NOTES
[facility]  Hematology clinic visit  08/07/2023     Problem list:  - Myelodysplastic syndrome with excess blasts-2 (MDS-EB 2).  Bone marrow biopsy 12/15/2022.   IPSS-R score 8.5-very high - based on cytogenetics -7, > 10% blasts, hemoglobin less than 8, platelet , ANC less than 0.8     Final Diagnosis   A.  Bone Marrow Biopsy from left posterior iliac crest and peripheral smear morphology:  - Myelodysplastic syndrome with excess blasts 2 (MDS-EB2) showing:       - Increased marrow blasts (13.3% on imprint smear differential, 12% on flow cytometry).       - Mildly hypercellular marrow for age , 30-35% .      - Moderately increased marrow fibrosis (MF-2).      - No appreciable dysplasia.      - Increased marrow storage iron with 84% sideroblasts, 6% ringed sideroblasts      - Peripheral blood pancytopenia showing:            - Moderate macrocytic, normochromic anemia without evidence of hemolysis or increased red cell regeneration, rare circulating nucleated red blood cell.           - Moderate leukopenia with rare circulating blasts without Bernice rods.           - Moderate to marked thrombocytopenia.   Electronically signed by Rick Bangura MD on 12/19/2022    Cytogenetics-46,XX,-7,+21[20]  Detected Alterations of Known or Potential Pathogenicity: RUNX1 G199E   Detected Alterations of Uncertain Significance: None   Genes with No Detected Clinically Significant Alterations: ABL1, ALK, ASXL1, ATRX, BCOR, CALR, CBL, CEBPA, CSF3R, DNMT3A, ETV6, EZH2, FLT3, GATA1, GATA2, HRAS, IDH1, IDH2, JAK1, JAK2, JAK3, KIT, KMT2A, KRAS, MPL, NF1, NPM1, NRAS, PDGFRA, PDGFRB, PHF6, PTPN11, JAMA, SETBP1, SF1, SF3A1, SF3B1, SH2B3, SRSF2, TET2, TP53, U2AF2, WT1, ZRSR2     She was initially diagnosed as aplastic anemia-diagnosed 9/26/2016, repeat bone marrow biopsy 12/22/16- bone marrow <5% cellular with no dysplasia.  Normal cytogenetics. PNH negative. 1/9/ 2017-treated with ATG,  "methylprednisolone, cyclosporine, and eltrombopag.  Cyclosporine discontinued May 2021, eltrombopag discontinued June 2021 due to abnormal liver function test.    Partial remission with the immunotherapy, which is gradually weaned.  Now transfusion dependent  - Chronic kidney disease stage IV  - Atrial fibrillation-on apixaban chronically  -History of iron deficiency anemia- Venofer 300 mg IV 7/7/2021, 8/6/2021, 4/14/2022  -History of B12 deficiency  -MGUS  - COPD  - GERD  -History of lower GI bleed 2017  -Severely decreased bilateral hearing  - Benign paroxysmal positional vertigo  - History of unprovoked left leg deep vein thrombosis (DVT)  3/17/21 and bilateral pulmonary embolism (PE) 9/11/12  - Osteoporosis with compression fracture of spine.  - Shingles right face  2022  - Fall with right hip fracture 1/16/2023  - Subdural hematoma related to fall from standing 1/16/2023-no surgical intervention needed      Interval history:   Ms. Raymundo presents to clinic for follow-up.  She reports that since Friday she has been spending nearly all her time in bed.  After her platelet transfusion she started to experience right-sided flank pain.  Since then the pain has continued.  Today she feels the pain is slightly better but not completely resolved.  At baseline she feels tired and weak.  She also reports that she has been experiencing right lower quadrant pain along with constipation.  She has not taken anything for constipation as she did not have any MiraLAX on hand.  She also complains of urinary frequency.  In the past she has had urinary tract infections unclear if this feels similar.  She denies any fevers or chills.  She has shortness of breath and fatigue at baseline.  She denies any hematuria or hematochezia.    She has been drinking around 16 oz of fluids per day over the weekend and has been eating \"some food\".     ROS: 12 point ROS neg other than the symptoms noted above in the HPI.    PHYSICAL EXAMINATION:  "   /69   Pulse 106   Temp 97.8  F (36.6  C) (Oral)   Resp 16   Wt 44 kg (96 lb 14.4 oz)   SpO2 98%   BMI 18.92 kg/m    General: No acute distress, guarded  HEENT: Sclera anicteric. Oral mucosa pink and moist.  No mucositis or thrush.    Heart: Regular, rate, and rhythm  Lungs: Clear to ascultation bilaterally  Abdomen: Positive bowel sounds. + R sided CVA tenderness. No L sided CVA tenderness. No abdominal bruising or rashes on skin. Non-distended.   Extremities: no lower extremity edema  Neuro: Cranial nerves grossly intact  Skin: Generalized bruising, no rashes or sores on exposed skin    Labs:  Most Recent 3 CBC's:  Recent Labs   Lab Test 08/07/23  1205 08/04/23  1026 08/01/23  1154 10/05/22  1017 09/12/22  1443 07/08/22  1103 06/27/22  1312 06/16/22  1020 06/02/22  1741   WBC 1.5* 1.3* 1.4*   < > 3.1*   < > 2.6*   < > 2.2*   HGB 8.0* 8.4* 7.7*   < > 7.5*   < > 6.8*   < > 6.5*   MCV 97 94 95   < > 107*   < > 110*   < > 115*   PLT 8* 2* 17*   < > 80*   < > 111*   < > 121*   ANEUTAUTO  --   --   --   --  1.3*  --  1.0*  --  0.7*    < > = values in this interval not displayed.     Most Recent 3 BMP's:  Recent Labs   Lab Test 08/07/23  1205 08/04/23  1026 08/01/23  1154    138 138   POTASSIUM 4.6 4.5 4.1   CHLORIDE 101 105 105   CO2 25 27 25   BUN 44.9* 30.3* 24.8*   CR 2.45* 1.17* 1.01*   ANIONGAP 10 6* 8   LEO 9.4 9.2 8.9   * 117* 96   PROTTOTAL 8.3 7.6 7.5   ALBUMIN 3.4* 3.1* 3.0*    Most Recent 3 LFT's:  Recent Labs   Lab Test 08/07/23  1205 08/04/23  1026 08/01/23  1154   AST 8 7 12   ALT 5 <5 <5   ALKPHOS 110* 105* 112*   BILITOTAL 0.8 0.8 0.8    Most Recent 2 TSH and T4:  Recent Labs   Lab Test 07/26/22  1123 04/30/18  1250   TSH 1.97 1.08     UA:   Component      Latest Ref Rng 8/7/2023  12:53 PM   Color Urine      Colorless, Straw, Light Yellow, Yellow  Yellow    Appearance Urine      Clear  Clear    Glucose Urine      Negative mg/dL Negative    Bilirubin Urine      Negative  Negative  "   Ketones Urine      Negative mg/dL Negative    Specific Gravity Urine      1.003 - 1.035  1.020    Blood Urine      Negative  Moderate !    pH Urine      5.0 - 7.0  6.5    Protein Albumin Urine      Negative mg/dL 10 !    Nitrite Urine      Negative  Negative    Leukocyte Esterase Urine      Negative  Small !    WBC Urine      <=5 /HPF 14 (H)    RBC Urine      <=2 /HPF 30 (H)    Urobilinogen mg/dL      Normal, 2.0 mg/dL Normal    Mucus Urine      None Seen /LPF Present !    Squamous Epithelial /HPF Urine      <=1 /HPF 3 (H)    Transitional Epi      <=1 /HPF 1    Calcium Oxalate      None Seen /HPF Few !    Hyaline Casts      <=2 /LPF 3 (H)    Urine culture pending.     I reviewed the above labs today.    Assessment and recommendation:     # MDS-EB 2- IPSS-R score very high risk-this means her median overall survival is 0.8 years and it 25% chance of developing acute leukemia within 0.7 years.  Dr. Spann previously discussed she was hopeful that the next time she receives the azacitidine, she will not feel as fatigued afterwards due to decreased  dose of azacitidine. S/p C2 and patient continues with fatigue and is unsure if she noticed any improvement in fatigue since last infusion.  She does have new blood blisters to the inside of her mouth.   - S/p C2 azacitidine 6/19 - 6/25/2023-  Decreased azacitidine dose by 25%.  Patient reports feeling \"terrible\" and experiencing SOB, light-headed, dizziness, and fatigue.    - Patient returns to clinic today with acute R flank pain and a new ELSA with creatinine of 2.45 (baseline appears to be between 1.00-1.20). UA suspicious for infection although actually appears improved from past. Given acute findings, we will proceed with a non-contrast CT of the abdomen/pelvis to work up symptoms and pain.   - In the mean time, will start on levofloxacin 750 mg today and then 500 mg every 48 hours x 5 days for presumed acute pyelonephritis. Dose reduced after discussion with pharmacy " "based on CrCl of 12.9. Urine culture pending today.   - Will hold C3 Azacitadine today to further workup the above. Patient appeared relieved with an audible \"oh good\" with holding Aza. Will need to reconsider patients goals with continued Aza vs.supportive measures with transfusions alone for upcoming visits. Her pancytopenia is secondary to her MDS.   - patient expresses she is still interested in receiving up to twice weekly blood products today.   - will proceed with IV fluid bolus today (1 L if patient is willing to stay). Will recheck creatinine tomorrow when she returns for blood products. Discussed with patient if her creatinine is worsened despite outpatient intervention my recommendation is for admission. She expresses understanding but prefers optimizing outpatient management as long as possible.     #Anemia, thrombocytopenia-this is due to mainly to the MDS.  She has not responded to Aranesp.     -  CBC with possible RBC transfusion 2x/week  - Transfuse 1 unit PRBCs for hemoglobin 7.0-8 if symptomatic, 2 units for hemoglobin < 7.   - Transfuse 1 unit platelets for platelet count <10k or serious bleeding.  - Hemoglobin 8.0 today, platelets 8.0. Will administer 1 units PRBC and 1 unit Plt tomorrow- patient declines blood products while here today but would like them tomorrow.      #Chronic kidney disease  #ELSA- acutely worsened creatinine of 2.45 today. Baseline appears between 1.00-1.18.   #Constipation  - reviewed use of miralax. Patient has responded well to miralax in the past.      #Subdural hematoma and subarachnoid hemorrhage after a fall- she had a DVT a couple of years ago. Risk of bleeding while on anticoagulation outweighs risk of thrombosis in view of significant chronic thrombocytopenia.  -No apixaban or other anticoagulant.     #Frailty- attempts for home care evaluation have been delayed, Sue dang RN will follow-up. Not addressed today      #Driving-   recommend against driving-hearing, " strength, reaction time not adequate. Not specifically discussed today. .     #CODE STATUS-  DO NOT RESUSCITATE order was placed in chart 1/11/2023.       Overall plan:   - hold aza today for work up of new ELSA as above.   - 1 L of NS, start levofloxacin (dose adjusted for creatinine clearance), and CT w/o contrast of the abdomen/pelvis today.   - 1 unit pRBC and 1 unit of platelets tomorrow. Continue twice weekly and blood products.     70 minutes spent on the date of the encounter doing chart review, review of test results, interpretation of tests, patient visit, and documentation     Jaqui Dan PA-C    Addendum   Spoke with radiology regarding preliminary CT results which demonstrated an right sided obstructing UVJ stone with moderate hydronephrosis. I personally called Bonny and reviewed these results with recommendations to proceed to the emergency department for further evaluation with likely urology consultation. Patient expresses understanding. She declines going to the [facility] but states she will present to [facility]. I personally called and gave report to [facility] at ~5:50 pm.     Jaqui Dan PA-C

## 2023-08-07 NOTE — PROGRESS NOTES
Infusion Nursing Note:  Bonny Raymundo presents today for Cycle 3, Day 1 Vidaza - HELD, 1L IVF.    Patient seen by provider today: Yes: DOROTA Christopher   present during visit today: Not Applicable.    Note: Per Jaqui, no treatment today. Pt will get 1L IVF for ELSA. Pt will come back tomorrow for blood/plt transfusion per patient preference. CT scan scheduled today after infusion appointment. We will recheck creatinine tomorrow with labs.      Intravenous Access:  Peripheral IV placed.    Treatment Conditions:  Lab Results   Component Value Date    HGB 8.0 (L) 08/07/2023    WBC 1.5 (L) 08/07/2023    ANEU 0.3 (LL) 08/07/2023    ANEUTAUTO 1.3 (L) 09/12/2022    PLT 8 (LL) 08/07/2023        Lab Results   Component Value Date     08/07/2023    POTASSIUM 4.6 08/07/2023    MAG 1.8 06/02/2022    CR 2.45 (H) 08/07/2023    LEO 9.4 08/07/2023    BILITOTAL 0.8 08/07/2023    ALBUMIN 3.4 (L) 08/07/2023    ALT 5 08/07/2023    AST 8 08/07/2023         Post Infusion Assessment:  Patient tolerated infusion without incident.  Blood return noted pre and post infusion.  Site patent and intact, free from redness, edema or discomfort.  No evidence of extravasations.  Access discontinued per protocol.       Discharge Plan:   Patient declined prescription refills.  Copy of AVS reviewed with patient and/or family.  Patient will return 8/8/23 for next appointment.  Patient discharged in stable condition accompanied by: self.  Departure Mode: Ambulatory.      Jennie Munoz RN

## 2023-08-07 NOTE — PATIENT INSTRUCTIONS
[facility] Triage and after hours / weekends / holidays:  [phone]    Please call the triage or after hours line if you experience a temperature greater than or equal to 100.4, shaking chills, have uncontrolled nausea, vomiting and/or diarrhea, dizziness, shortness of breath, chest pain, bleeding, unexplained bruising, or if you have any other new/concerning symptoms, questions or concerns.      If you are having any concerning symptoms or wish to speak to a provider before your next infusion visit, please call triage to notify them so we can adequately serve you.     If you need a refill on a narcotic prescription or other medication, please call before your infusion appointment.       - Start levofloxacin 750 mg (one 500 mg tablet + one 250 mg tablet) today. On 08/09/23 take one 500 mg tablet. On 08/11/23 take your last 500 me tablet. Your antibiotic is given every other day like this due to your reduced kidney function. This antibiotic has a rare risk of tendon pain such has achilles tendon pains/ankle pain if this happens please stop the antibiotic and call us right away.   - Continue to push oral hydration.   - Return tomorrow for blood products and to recheck your kidney function.   - Monitor overnight for worsened side pain, worsened abdominal pain, fevers, and chills. Call triage for next steps if these were to occur at [phone].     - We will be in contact with you regarding your CT results and next steps.              August 2023 Sunday Monday Tuesday Wednesday Thursday Friday Saturday             1    LAB PERIPHERAL  11:30 AM   (15 min.)   [facility] LAB DRAW   [facility]    ONC INFUSION 3 HR (180 MIN)  12:00 PM   (180 min.)    ONC INFUSION NURSE   [facility] 2    NEW FOOT/ANKLE   2:00 PM   (15 min.)   Garrett Feliciano DPM   [facility] 3     4    LAB PERIPHERAL  10:00 AM   (15 min.)   [facility] LAB  DRAW   [facility]    ONC INFUSION 3 HR (180 MIN)  10:30 AM   (180 min.)   [facility] ONC INFUSION NURSE   [facility] 5       6     7    LAB PERIPHERAL  11:45 AM   (15 min.)   [facility] DRAW   [facility]    RETURN CCSL  12:15 PM   (45 min.)   Jaqui Dan PA-C   [facility]    ONC INFUSION 1 HR (60 MIN)   1:00 PM   (60 min.)   [facility] ONC INFUSION NURSE   [facility]    CT ABDOMEN PELVIS WO   6:15 PM   (20 min.)   [id number]   [facility] 8    ONC INFUSION 1 HR (60 MIN)   1:00 PM   (60 min.)   [facility] ONC INFUSION NURSE   [facility] 9    ONC INFUSION 1 HR (60 MIN)  11:00 AM   (60 min.)   [facility] ONC INFUSION NURSE   [facility] 10    ONC INFUSION 1 HR (60 MIN)   2:00 PM   (60 min.)   [facility] ONC INFUSION NURSE   [facility] 11    ONC INFUSION 1 HR (60 MIN)   2:00 PM   (60 min.)   [facility] ONC INFUSION NURSE   [facility] 12       13     14     15    ONC INFUSION 3 HR (180 MIN)  12:00 PM   (180 min.)   [facility] ONC INFUSION NURSE   [facility] 16     17     18     19       20     21     22     23     24     25     26       27     28     29     30     31 September 2023 Sunday Monday Tuesday Wednesday Thursday Friday Saturday                            1     2       3     4     5     6     7     8     9       10     11     12     13    LAB PERIPHERAL   9:00 AM   (15 min.)   [facility] DRAW   [facility]    RETURN CCSL   9:15 AM   (30 min.)   Minerva Spann MD   [facility] 14     15     16       17     18     19     20     21     22     23       24     25     26     27     28     29     30                     Lab Results:  Recent Results  (from the past 12 hour(s))   Comprehensive metabolic panel    Collection Time: 08/07/23 12:05 PM   Result Value Ref Range    Sodium 136 136 - 145 mmol/L    Potassium 4.6 3.4 - 5.3 mmol/L    Chloride 101 98 - 107 mmol/L    Carbon Dioxide (CO2) 25 22 - 29 mmol/L    Anion Gap 10 7 - 15 mmol/L    Urea Nitrogen 44.9 (H) 8.0 - 23.0 mg/dL    Creatinine 2.45 (H) 0.51 - 0.95 mg/dL    Calcium 9.4 8.8 - 10.2 mg/dL    Glucose 119 (H) 70 - 99 mg/dL    Alkaline Phosphatase 110 (H) 35 - 104 U/L    AST 8 0 - 45 U/L    ALT 5 0 - 50 U/L    Protein Total 8.3 6.4 - 8.3 g/dL    Albumin 3.4 (L) 3.5 - 5.2 g/dL    Bilirubin Total 0.8 <=1.2 mg/dL    GFR Estimate 19 (L) >60 mL/min/1.73m2   Adult Type and Screen    Collection Time: 08/07/23 12:05 PM   Result Value Ref Range    ABO/RH(D) A POS     Antibody Screen Negative Negative    SPECIMEN EXPIRATION DATE [id number]    CBC with platelets and differential    Collection Time: 08/07/23 12:05 PM   Result Value Ref Range    WBC Count 1.5 (L) 4.0 - 11.0 10e3/uL    RBC Count 2.63 (L) 3.80 - 5.20 10e6/uL    Hemoglobin 8.0 (L) 11.7 - 15.7 g/dL    Hematocrit 25.4 (L) 35.0 - 47.0 %    MCV 97 78 - 100 fL    MCH 30.4 26.5 - 33.0 pg    MCHC 31.5 31.5 - 36.5 g/dL    RDW 13.7 10.0 - 15.0 %    Platelet Count 8 (LL) 150 - 450 10e3/uL   Manual Differential    Collection Time: 08/07/23 12:05 PM   Result Value Ref Range    % Neutrophils 18 %    % Lymphocytes 62 %    % Monocytes 11 %    % Eosinophils 0 %    % Basophils 1 %    % Blasts 8 %    Absolute Neutrophils 0.3 (LL) 1.6 - 8.3 10e3/uL    Absolute Lymphocytes 0.9 0.8 - 5.3 10e3/uL    Absolute Monocytes 0.2 0.0 - 1.3 10e3/uL    Absolute Eosinophils 0.0 0.0 - 0.7 10e3/uL    Absolute Basophils 0.0 0.0 - 0.2 10e3/uL    Absolute Blasts 0.1 (H) <=0.0 10e3/uL    RBC Morphology Confirmed RBC Indices     Platelet Assessment  Automated Count Confirmed. Platelet morphology is normal.     Automated Count Confirmed. Platelet morphology is normal.   Routine UA with  micro reflex to culture    Collection Time: 08/07/23 12:53 PM    Specimen: Urine, Clean Catch   Result Value Ref Range    Color Urine Yellow Colorless, Straw, Light Yellow, Yellow    Appearance Urine Clear Clear    Glucose Urine Negative Negative mg/dL    Bilirubin Urine Negative Negative    Ketones Urine Negative Negative mg/dL    Specific Gravity Urine 1.020 1.003 - 1.035    Blood Urine Moderate (A) Negative    pH Urine 6.5 5.0 - 7.0    Protein Albumin Urine 10 (A) Negative mg/dL    Urobilinogen Urine Normal Normal, 2.0 mg/dL    Nitrite Urine Negative Negative    Leukocyte Esterase Urine Small (A) Negative    Mucus Urine Present (A) None Seen /LPF    Calcium Oxalate Crystals Urine Few (A) None Seen /HPF    RBC Urine 30 (H) <=2 /HPF    WBC Urine 14 (H) <=5 /HPF    Squamous Epithelials Urine 3 (H) <=1 /HPF    Transitional Epithelials Urine 1 <=1 /HPF    Hyaline Casts Urine 3 (H) <=2 /LPF

## 2023-08-07 NOTE — LETTER
8/7/2023         RE: Bonny Raymundo  [address]  [facility] [address]        Dear Colleague,    Thank you for referring your patient, Bonny Raymundo, to the [facility]. Please see a copy of my visit note below.    [facility]  Hematology clinic visit  08/07/2023     Problem list:  - Myelodysplastic syndrome with excess blasts-2 (MDS-EB 2).  Bone marrow biopsy 12/15/2022.   IPSS-R score 8.5-very high - based on cytogenetics -7, > 10% blasts, hemoglobin less than 8, platelet , ANC less than 0.8     Final Diagnosis   A.  Bone Marrow Biopsy from left posterior iliac crest and peripheral smear morphology:  - Myelodysplastic syndrome with excess blasts 2 (MDS-EB2) showing:       - Increased marrow blasts (13.3% on imprint smear differential, 12% on flow cytometry).       - Mildly hypercellular marrow for age , 30-35% .      - Moderately increased marrow fibrosis (MF-2).      - No appreciable dysplasia.      - Increased marrow storage iron with 84% sideroblasts, 6% ringed sideroblasts      - Peripheral blood pancytopenia showing:            - Moderate macrocytic, normochromic anemia without evidence of hemolysis or increased red cell regeneration, rare circulating nucleated red blood cell.           - Moderate leukopenia with rare circulating blasts without Bernice rods.           - Moderate to marked thrombocytopenia.   Electronically signed by Rick Bangura MD on 12/19/2022    Cytogenetics-46,XX,-7,+21[20]  Detected Alterations of Known or Potential Pathogenicity: RUNX1 G199E   Detected Alterations of Uncertain Significance: None   Genes with No Detected Clinically Significant Alterations: ABL1, ALK, ASXL1, ATRX, BCOR, CALR, CBL, CEBPA, CSF3R, DNMT3A, ETV6, EZH2, FLT3, GATA1, GATA2, HRAS, IDH1, IDH2, JAK1, JAK2, JAK3, KIT, KMT2A, KRAS, MPL, NF1, NPM1, NRAS, PDGFRA, PDGFRB, PHF6, PTPN11, JAMA, SETBP1, SF1, SF3A1, SF3B1, SH2B3, SRSF2,  TET2, TP53, U2AF2, WT1, ZRSR2     She was initially diagnosed as aplastic anemia-diagnosed 9/26/2016, repeat bone marrow biopsy 12/22/16- bone marrow <5% cellular with no dysplasia.  Normal cytogenetics. PNH negative. 1/9/ 2017-treated with ATG, methylprednisolone, cyclosporine, and eltrombopag.  Cyclosporine discontinued May 2021, eltrombopag discontinued June 2021 due to abnormal liver function test.    Partial remission with the immunotherapy, which is gradually weaned.  Now transfusion dependent  - Chronic kidney disease stage IV  - Atrial fibrillation-on apixaban chronically  -History of iron deficiency anemia- Venofer 300 mg IV 7/7/2021, 8/6/2021, 4/14/2022  -History of B12 deficiency  -MGUS  - COPD  - GERD  -History of lower GI bleed 2017  -Severely decreased bilateral hearing  - Benign paroxysmal positional vertigo  - History of unprovoked left leg deep vein thrombosis (DVT)  3/17/21 and bilateral pulmonary embolism (PE) 9/11/12  - Osteoporosis with compression fracture of spine.  - Shingles right face  2022  - Fall with right hip fracture 1/16/2023  - Subdural hematoma related to fall from standing 1/16/2023-no surgical intervention needed      Interval history:   Ms. Raymundo presents to clinic for follow-up.  She reports that since Friday she has been spending nearly all her time in bed.  After her platelet transfusion she started to experience right-sided flank pain.  Since then the pain has continued.  Today she feels the pain is slightly better but not completely resolved.  At baseline she feels tired and weak.  She also reports that she has been experiencing right lower quadrant pain along with constipation.  She has not taken anything for constipation as she did not have any MiraLAX on hand.  She also complains of urinary frequency.  In the past she has had urinary tract infections unclear if this feels similar.  She denies any fevers or chills.  She has shortness of breath and fatigue at baseline.   "She denies any hematuria or hematochezia.    She has been drinking around 16 oz of fluids per day over the weekend and has been eating \"some food\".     ROS: 12 point ROS neg other than the symptoms noted above in the HPI.    PHYSICAL EXAMINATION:    /69   Pulse 106   Temp 97.8  F (36.6  C) (Oral)   Resp 16   Wt 44 kg (96 lb 14.4 oz)   SpO2 98%   BMI 18.92 kg/m    General: No acute distress, guarded  HEENT: Sclera anicteric. Oral mucosa pink and moist.  No mucositis or thrush.    Heart: Regular, rate, and rhythm  Lungs: Clear to ascultation bilaterally  Abdomen: Positive bowel sounds. + R sided CVA tenderness. No L sided CVA tenderness. No abdominal bruising or rashes on skin. Non-distended.   Extremities: no lower extremity edema  Neuro: Cranial nerves grossly intact  Skin: Generalized bruising, no rashes or sores on exposed skin    Labs:  Most Recent 3 CBC's:  Recent Labs   Lab Test 08/07/23  1205 08/04/23  1026 08/01/23  1154 10/05/22  1017 09/12/22  1443 07/08/22  1103 06/27/22  1312 06/16/22  1020 06/02/22  1741   WBC 1.5* 1.3* 1.4*   < > 3.1*   < > 2.6*   < > 2.2*   HGB 8.0* 8.4* 7.7*   < > 7.5*   < > 6.8*   < > 6.5*   MCV 97 94 95   < > 107*   < > 110*   < > 115*   PLT 8* 2* 17*   < > 80*   < > 111*   < > 121*   ANEUTAUTO  --   --   --   --  1.3*  --  1.0*  --  0.7*    < > = values in this interval not displayed.     Most Recent 3 BMP's:  Recent Labs   Lab Test 08/07/23  1205 08/04/23  1026 08/01/23  1154    138 138   POTASSIUM 4.6 4.5 4.1   CHLORIDE 101 105 105   CO2 25 27 25   BUN 44.9* 30.3* 24.8*   CR 2.45* 1.17* 1.01*   ANIONGAP 10 6* 8   LEO 9.4 9.2 8.9   * 117* 96   PROTTOTAL 8.3 7.6 7.5   ALBUMIN 3.4* 3.1* 3.0*    Most Recent 3 LFT's:  Recent Labs   Lab Test 08/07/23  1205 08/04/23  1026 08/01/23  1154   AST 8 7 12   ALT 5 <5 <5   ALKPHOS 110* 105* 112*   BILITOTAL 0.8 0.8 0.8    Most Recent 2 TSH and T4:  Recent Labs   Lab Test 07/26/22  1123 04/30/18  1250   TSH 1.97 1.08 " "    I reviewed the above labs today.    Assessment and recommendation:     # MDS-EB 2- IPSS-R score very high risk-this means her median overall survival is 0.8 years and it 25% chance of developing acute leukemia within 0.7 years.  Dr. Spann previously discussed she was hopeful that the next time she receives the azacitidine, she will not feel as fatigued afterwards due to decreased  dose of azacitidine. S/p C2 and patient continues with fatigue and is unsure if she noticed any improvement in fatigue since last infusion.  She does have new blood blisters to the inside of her mouth.   - S/p C2 azacitidine 6/19 - 6/25/2023-  Decreased azacitidine dose by 25%.  Patient reports feeling \"terrible\" and experiencing SOB, light-headed, dizziness, and fatigue.    - Patient returns to clinic today with acute R flank pain and a new ELSA with creatinine of 2.45 (baseline appears to be between 1.00-1.20). UA suspicious for infection although actually appears improved from past. Given acute findings, we will proceed with a non-contrast CT of the abdomen/pelvis to work up symptoms and pain.   - In the mean time, will start on levofloxacin 750 mg today and then 500 mg every 48 hours x 5 days for presumed acute pyelonephritis. Dose reduced after discussion with pharmacy based on CrCl of 12.9. Urine culture pending today.   - Will hold C3 Azacitadine today to further workup the above. Patient appeared relieved with an audible \"oh good\" with holding Aza. Will need to reconsider patients goals with continued Aza vs.supportive measures with transfusions alone for upcoming visits. Her pancytopenia is secondary to her MDS.   - patient expresses she is still interested in receiving up to twice weekly blood products today.   - will proceed with IV fluid bolus today (1 L if patient is willing to stay). Will recheck creatinine tomorrow when she returns for blood products. Discussed with patient if her creatinine is worsened despite outpatient " intervention my recommendation is for admission. She expresses understanding but prefers optimizing outpatient management as long as possible.     #Anemia, thrombocytopenia-this is due to mainly to the MDS.  She has not responded to Aranesp.     -  CBC with possible RBC transfusion 2x/week  - Transfuse 1 unit PRBCs for hemoglobin 7.0-8 if symptomatic, 2 units for hemoglobin < 7.   - Transfuse 1 unit platelets for platelet count <10k or serious bleeding.  - Hemoglobin 8.0 today, platelets 8.0. Will administer 1 units PRBC and 1 unit Plt tomorrow- patient declines blood products while here today but would like them tomorrow.      #Chronic kidney disease  #ELSA- acutely worsened creatinine of 2.45 today. Baseline appears between 1.00-1.18.  as above with CrCl below baseline at 12.9. Wno significant change, Crt 1.18 today and GFR 47    #Constipation  - reviewed use of miralax. Patient has responded well to miralax in the past.      #Subdural hematoma and subarachnoid hemorrhage after a fall- she had a DVT a couple of years ago. Risk of bleeding while on anticoagulation outweighs risk of thrombosis in view of significant chronic thrombocytopenia.  -No apixaban or other anticoagulant.     #Frailty- attempts for home care evaluation have been delayed, Sue dang RN will follow-up. Not addressed today      #Driving-   recommend against driving-hearing, strength, reaction time not adequate. Not specifically discussed today. .     #CODE STATUS-  DO NOT RESUSCITATE order was placed in chart 1/11/2023.       Overall plan:   - hold aza today for work up of new ELSA as above.   - 1 L of NS, start levofloxacin (dose adjusted for creatinine clearance), and CT w/o contrast of the abdomen/pelvis today.   - 1 unit pRBC and 1 unit of platelets tomorrow. Continue twice weekly and blood products.     70 minutes spent on the date of the encounter doing chart review, review of test results, interpretation of tests, patient visit, and  documentation     Jaqui Dan PA-C

## 2023-08-07 NOTE — PROGRESS NOTES
"[facility]: Cancer Care                                                                                        Pt called and left message this morning that she \"wasn't feeling well and didn't want to come in today\".  Writer called and spoke with pt.  She states she has been in bed since Friday, is weak and has a pain in her right side.  She states she doesn't feel like coming in today.  Writer encouraged pt to come to clinic so we can see how best we can help her feel better.  Advised that staying at home, we would not be able to help her.  Pt agreed and will come in and see Jaqui and get her Aza.  She will probably be late for labs due to having to get an uber.  Updated Jaqui, who will talk to Dr Spann about a POC.        Signature:  Sue Marquez RN  "

## 2023-08-07 NOTE — NURSING NOTE
Chief Complaint   Patient presents with    Blood Draw     IV placement with blood draw by lab RN. Vitals taken and appointment arrived     Patient states that she has pain in the abdomen, right side. She states that she has been in bed the last 48 hours. She was unable to rate the pain.     Laisha Shepard RN

## 2023-08-07 NOTE — NURSING NOTE
Oncology Rooming Note    August 7, 2023 12:21 PM   Bonny Raymundo is a 77 year old female who presents for:    Chief Complaint   Patient presents with    Blood Draw     IV placement with blood draw by lab RN. Vitals taken and appointment arrived    Oncology Clinic Visit     Refractory anemia with excess blasts-2     Initial Vitals: /69   Pulse 106   Temp 97.8  F (36.6  C) (Oral)   Resp 16   Wt 44 kg (96 lb 14.4 oz)   SpO2 98%   BMI 18.92 kg/m   Estimated body mass index is 18.92 kg/m  as calculated from the following:    Height as of 8/2/23: 1.524 m (5').    Weight as of this encounter: 44 kg (96 lb 14.4 oz). Body surface area is 1.36 meters squared.  Moderate Pain (4) Comment: Right side of abdomen   No LMP recorded. Patient has had a hysterectomy.  Allergies reviewed: Yes  Medications reviewed: Yes    Medications: Medication refills not needed today.  Pharmacy name entered into [facility]:    [facility] - [place], [place] - [address], [address]  [facility] [place] - [address] [id number] - [place], [place] - [address] [facility]    Clinical concerns:       Sourav Rae

## 2023-08-07 NOTE — PATIENT INSTRUCTIONS
- Start levofloxacin 750 mg (one 500 mg tablet + one 250 mg tablet) today. On 08/09/23 take one 500 mg tablet. On 08/11/23 take your last 500 me tablet. Your antibiotic is given every other day like this due to your reduced kidney function. This antibiotic has a rare risk of tendon pain such has achilles tendon pains/ankle pain if this happens please stop the antibiotic and call us right away.   - Continue to push oral hydration.   - Return tomorrow for blood products and to recheck your kidney function.   - Monitor overnight for worsened side pain, worsened abdominal pain, fevers, and chills. Call triage for next steps if these were to occur at [phone].    - We will be in contact with you regarding your CT results and next steps.

## 2023-08-08 NOTE — PROVIDER NOTIFICATION
MD Notification    Notified Person: MD    Notified Person Name: Dr. aCr     Notification Date/Time: 8/8/23    Notification Interaction: vocera    Purpose of Notification: Hem/Onc has note in, recommending transfusing w/ platelet <10. Do you want a unit transfused today or wait until tomorrow before procedure?    Orders Received: transfuse 1 unit today    Comments:

## 2023-08-08 NOTE — ED TRIAGE NOTES
Had CT done today, sent here to have surgery on kidney stone. CT shows hydronephrosis per report.     Triage Assessment       Row Name 08/07/23 1936       Triage Assessment (Adult)    Airway WDL WDL       Respiratory WDL    Respiratory WDL WDL       Skin Circulation/Temperature WDL    Skin Circulation/Temperature WDL WDL       Cardiac WDL    Cardiac WDL WDL       Peripheral/Neurovascular WDL    Peripheral Neurovascular WDL WDL       Cognitive/Neuro/Behavioral WDL    Cognitive/Neuro/Behavioral WDL WDL

## 2023-08-08 NOTE — PHARMACY-ADMISSION MEDICATION HISTORY
Pharmacist Admission Medication History    Admission medication history is complete. The information provided in this note is only as accurate as the sources available at the time of the update.    Medication reconciliation/reorder completed by provider prior to medication history? No    Information Source(s): Clinic records and CareEverywhere/SureScripts via in-person    Pertinent Information: Patient refused to complete med rec. Confirmed took morning meds today but otherwise did not want to confirm list so list was confirmed through refills and careverywhere information.    Changes made to PTA medication list:  Added: None  Deleted: None  Changed: None    Medication Affordability:  Not including over the counter (OTC) medications, was there a time in the past 3 months when you did not take your medications as prescribed because of cost?: Unable to Assess    Allergies reviewed with patient and updates made in EHR: unable to assess    Medication History Completed By: Laisha Walker RPH 8/7/2023 9:10 PM    Prior to Admission medications    Medication Sig Last Dose Taking? Auth Provider Long Term End Date   acetaminophen (TYLENOL) 650 MG CR tablet Take 650 mg by mouth every 8 hours as needed for mild pain or fever  at prn Yes Unknown, Entered By History No    acyclovir (ZOVIRAX) 400 MG tablet Take 1 tablet (400 mg) by mouth 2 times daily 8/7/2023 at AM Yes Jing Dillon APRN CNP Yes    CALCIUM PO Take 1 tablet by mouth daily 8/7/2023 at AM Yes Reported, Patient     diphenhydrAMINE (BENADRYL) 25 MG tablet Take 25 mg by mouth nightly as needed for sleep  at prn Yes Lana Panchal PA-C     levofloxacin (LEVAQUIN) 250 MG tablet Take one 250 mg tablet with one 500 mg tablet for a total dose of 750 mg today 08/07/23.  Yes Jaqui Dan PA-C     levofloxacin (LEVAQUIN) 250 MG tablet Take 1 tablet (250 mg) by mouth daily Past Week at ON HOLD Yes Jing Dillon APRN CNP     levofloxacin (LEVAQUIN) 500 MG  tablet Take 750 mg (one 500 mg tablet + one 250 mg tablet) today, 08/07/23. Then take your next dose of 500 mg (1 tablet) on 08/09/23 and another 500 mg (1 tablet) dose on 08/11.  Yes Jaqui Dan PA-C     multivitamin w/minerals (THERA-VIT-M) tablet Take 1 tablet by mouth daily 8/7/2023 at AM Yes Reported, Patient     ondansetron (ZOFRAN) 8 MG tablet Take 1 tab (8 mg) by mouth prior to azacitidine, then 1 tab every 8 hours as needed for nausea.  at prn Yes Minerva Spann MD     pantoprazole (PROTONIX) 20 MG EC tablet TAKE 1 TABLET BY MOUTH EVERY DAY  Patient taking differently: Take 20 mg by mouth daily 8/7/2023 at AM Yes Rafita Rogel MD     UNABLE TO FIND Take by mouth daily MEDICATION NAME: Vitamin K 8/7/2023 at AM Yes Reported, Patient

## 2023-08-08 NOTE — ED PROVIDER NOTES
History     Chief Complaint:  Flank Pain       The history is provided by the patient.      Bonny Raymundo is a 77 year old female who presents with right sided flank pain since Friday. She was getting an infusion at the time when she all of a sudden got the pain and then the pain has been intermittent. She has not thrown up yet but almost has. Has about only drank 16 oz a day and has not been able to eat much. Has been peeing a lot at night. She denies any fevers or chills.     Independent Historian:   None - Patient Only    Review of External Notes:   I reviewed the office visit notes from earlier today, noted to have a 5 mm obstructing stone with significant right-sided hydronephrosis, creatinine of 2.45 up from baseline closer to 1.1, she has her chronic pancytopenia with platelets of 8 and an ANC of 0.3      Medications:    Acyclovir   Diphenhydramine   Levofloxacin   Ondansetron   Pantoprazole   Zinc oxide     Past Medical History:    Allergic rhinitis   Aplastic anemia   DVT   Headache   Osteoporosis   Pulmonary emboli   RAEB-2  Sensorineural hearing loss    Past Surgical History:    Arthrodesis foot   Blepharoplasty   None marrow biopsy   Cataract   Colonoscopy  PICC insertion   C section   Hysterectomy   Myomectomy   Diverticulitis    Tonsillectomy   Appendectomy      Physical Exam   Patient Vitals for the past 24 hrs:   BP Temp Temp src Pulse Resp SpO2 Weight   08/07/23 2221 135/77 98.2  F (36.8  C) Oral 87 18 95 % --   08/07/23 1934 98/67 97.6  F (36.4  C) Temporal 94 18 94 % 45.4 kg (100 lb)        Physical Exam  Constitutional: Alert, attentive, GCS 15   Eyes: EOM are normal, anicteric, conjugate gaze  CV: distal extremities warm, well perfused  Chest: Non-labored breathing on RA  GI:  non tender. No distension. No guarding or rebound.    Neurological: Alert, attentive, moving all extremities equally.   Skin: Pale    Emergency Department Course     Laboratory:  Labs Ordered and Resulted from Time of  ED Arrival to Time of ED Departure   LACTIC ACID WHOLE BLOOD - Normal       Result Value    Lactic Acid 0.9        Emergency Department Course & Assessments:       Interventions:  Medications   acyclovir (ZOVIRAX) tablet 400 mg (has no administration in time range)   pantoprazole (PROTONIX) EC tablet 20 mg (has no administration in time range)   lidocaine 1 % 0.1-1 mL (has no administration in time range)   lidocaine (LMX4) cream (has no administration in time range)   sodium chloride (PF) 0.9% PF flush 3 mL (has no administration in time range)   sodium chloride (PF) 0.9% PF flush 3 mL (has no administration in time range)   melatonin tablet 1 mg (has no administration in time range)   cefTRIAXone (ROCEPHIN) 1 g vial to attach to  mL bag for ADULTS or NS 50 mL bag for PEDS (has no administration in time range)   sodium chloride 0.9% infusion (has no administration in time range)   acetaminophen (TYLENOL) tablet 650 mg (has no administration in time range)     Or   acetaminophen (TYLENOL) Suppository 650 mg (has no administration in time range)   oxyCODONE IR (ROXICODONE) half-tab 2.5 mg (has no administration in time range)   oxyCODONE (ROXICODONE) tablet 5 mg (has no administration in time range)   HYDROmorphone (DILAUDID) injection 0.2 mg (has no administration in time range)   HYDROmorphone (DILAUDID) injection 0.4 mg (has no administration in time range)   senna-docusate (SENOKOT-S/PERICOLACE) 8.6-50 MG per tablet 1 tablet (has no administration in time range)     Or   senna-docusate (SENOKOT-S/PERICOLACE) 8.6-50 MG per tablet 2 tablet (has no administration in time range)   ondansetron (ZOFRAN ODT) ODT tab 4 mg (has no administration in time range)     Or   ondansetron (ZOFRAN) injection 4 mg (has no administration in time range)   prochlorperazine (COMPAZINE) injection 5 mg (has no administration in time range)     Or   prochlorperazine (COMPAZINE) tablet 5 mg (has no administration in time range)      Or   prochlorperazine (COMPAZINE) suppository 12.5 mg (has no administration in time range)   polyethylene glycol (MIRALAX) Packet 17 g (has no administration in time range)   naloxone (NARCAN) injection 0.2 mg (has no administration in time range)     Or   naloxone (NARCAN) injection 0.4 mg (has no administration in time range)     Or   naloxone (NARCAN) injection 0.2 mg (has no administration in time range)     Or   naloxone (NARCAN) injection 0.4 mg (has no administration in time range)   0.9% sodium chloride BOLUS (1,000 mLs Intravenous $New Bag 23)   acetaminophen (TYLENOL) tablet 650 mg (650 mg Oral $Given 23)        Assessments:   I obtained history and examined the patient as noted above.    Independent Interpretation (X-rays, CTs, rhythm strip):  I personally reviewed her CT scan from prior prior to arrival, obstructing stone is notable.    Consultations/Discussion of Management or Tests:   I spoke with Urology, regarding the patient's history and presentation in the emergency department today.    I spoke with Dr. Pillai of the hospitalist team regarding the patient, who accepted the patient for admission.        Social Determinants of Health affecting care:   None    Disposition:  The patient was admitted to the hospital under the care of Dr. Pillai.     Impression & Plan    CMS Diagnoses: None    Medical Decision Makin-year-old woman past medical history seen for myelodysplastic syndrome on chronic transfusions with recurrent anemia and thrombocytopenia requiring blood products presenting with ELSA on the top of mild CKD in the setting of 3 days of right flank pain found to have  5 mm obstructing ureteral stone with moderate hydronephrosis.  Her UA is suggestive of UTI, I did give her ceftriaxone empirically especially given her immunosuppressed status though she does not have a fever or chills.  I did touch base with urology who thought holding off on antibiotics would be  reasonable however procalcitonin is elevated and we will continue with ceftriaxone after discussion with hospitalist team.  They did not think that emergent stenting was warranted given she most likely has prerenal disease with only 3 days of flank pain.  They will evaluate the patient in the morning.  No indication for emergent transfusion at this time as her is no plan for emergent intervention as of yet.    Diagnosis:    ICD-10-CM    1. Pancytopenia (H)  D61.818       2. Ureteral stone  N20.1       3. ELSA (acute kidney injury) (H)  N17.9            Shakeel Hogan MD  [employer]  12:11 AM 08/08/23     Scribe Disclosure:  I, Jacob Elise, am serving as a scribe at 8:36 PM on 8/7/2023 to document services personally performed by Shakeel Hogan MD based on my observations and the provider's statements to me.   8/7/2023   Shakeel Hogan MD Dunbar, John Forrest, MD  08/08/23 0011

## 2023-08-08 NOTE — ED NOTES
[facility]  ED Nurse Handoff Report    ED Chief complaint: Flank Pain      ED Diagnosis:   Final diagnoses:   None       Code Status: Full Code    Allergies:   Allergies   Allergen Reactions    Cats     Dogs     Seasonal Allergies        Patient Story: flank pain  Focused Assessment:  Patient had outpatient CT done showed 5 mm kidney stones, sent to ED for surgery.     Treatments and/or interventions provided: see MAR  Patient's response to treatments and/or interventions: TBD    To be done/followed up on inpatient unit:  Close monitoring    Does this patient have any cognitive concerns?:  na    Activity level - Baseline/Home:  Independent  Activity Level - Current:   Stand with Assist    Patient's Preferred language: English   Needed?: No    Isolation: None  Infection: Not Applicable  Patient tested for COVID 19 prior to admission: NO  Bariatric?: No    Vital Signs:   Vitals:    08/07/23 1934   BP: 98/67   BP Location: Right arm   Patient Position: Sitting   Cuff Size: Adult Regular   Pulse: 94   Resp: 18   Temp: 97.6  F (36.4  C)   TempSrc: Temporal   SpO2: 94%   Weight: 45.4 kg (100 lb)       Cardiac Rhythm:     Was the PSS-3 completed:   Yes  What interventions are required if any?               Family Comments: na  OBS brochure/video discussed/provided to patient/family: N/A              Name of person given brochure if not patient: na              Relationship to patient: na    For the majority of the shift this patient's behavior was Green.   Behavioral interventions performed were none.    ED NURSE PHONE NUMBER: *[id number]

## 2023-08-08 NOTE — PROGRESS NOTES
.MD Notification    Notified Person: MD    Notified Person Name:   Randi     Notification Date/Time: 08/07/23 22:34    Notification Interaction: WHOOP web     Purpose of Notification: critical lab valus hgb 6.2, platelet 5     Orders Received: Pending     Comments:

## 2023-08-08 NOTE — PROGRESS NOTES
CLINICAL NUTRITION SERVICES  -  ASSESSMENT NOTE      RECOMMENDATIONS FOR MD/PROVIDER TO ORDER:   Pt requested assistance from  prior to discharge. She is concerned with transportation to/from medical appointments and meal preparation      Recommendations Ordered by Registered Dietitian (RD):   Ordered admit wt  Ordered TID Ensure Enlive between meals (1 of each flavor)    RD introduced self to pt, discussed role in care. RD provided information about [employer] and encouraged pt to apply. D/w pt her increased protein needs for repletion. Pt would like to try each flavor of Ensure Enlive. Suggested pt have small, frequent meals with poor appetite. Good idea to continue at home. Encouraged pt to have protein supplemental drinks at home too for additional nutrition intake to maintain/gain wt. Pt seemed open to trying.      Malnutrition:   % Weight Loss:  None noted. Pt has been borderline underwt for past year   % Intake:  </= 50% for >/= 5 days (severe malnutrition) + suspect small at baseline  Subcutaneous Fat Loss:  Orbital region severe depletion and Upper arm region moderate depletion  Muscle Loss:  Temporal region moderate depletion, Clavicle bone region severe depletion, and Dorsal hand region moderate depletion  Fluid Retention:  None noted    Malnutrition Diagnosis: Severe malnutrition in the context of --  Acute illness or injury  Chronic illness or disease         REASON FOR ASSESSMENT  Bonny Raymundo is a 77 year old female seen by Registered Dietitian for Admission Nutrition Risk Screen for positive. Wt loss of 14-23# and decreased appetite reported.       NUTRITION HISTORY  Information obtained from chart review and d/w pt   PMH of Myelodysplastic syndrome (transfusion dependent), anemia, thrombocytopenia, CKD stage 3, GERD, severe hearing loss, Hx of PE/DVT, Hx of SDH/SAH and R hip fracture after fall (1/2023)     Per ED note-- pt reported since Friday (8/4), she has been unable to eat  "much. Only drinking ~16 oz of liquid a day and \"peeing a lot at night\"  Per H&P note-- pt reported poor appetite and PO intake    Per chart review, pt known to this RD service. Seen by RD in 2021. Appetite was good, intake was adequate, no wt loss noted.     Pt reported she typically eats 3 small meals/day. Has been eating less and \"fallen way below\" with her wt over past 18 months. Has been stable around 100# recently. Has been eating 50% less than usual since Friday. Has poor appetite, endorsed early satiety. Will drink the protein supplemental drinks \"once in a while.\" Has groceries delivered. Has difficulty with meal preparation. RD provided information about [employer] and encouraged pt to apply. Also mentioned having difficulty getting to/from medical appointments, spending $50 on [employer] costs each appointment to get to [facility] lately. Requested d/w SW, RD left note.       CURRENT NUTRITION ORDERS  Diet: Regular Diet Adult      Current Intake/Tolerance:  Visited with pt this morning. Pt was NPO during visit this AM. Has now advanced to Regular diet. D/w pt her increased protein needs for repletion. Pt would like to try each flavor of Ensure Enlive. Suggested pt have small, frequent meals with poor appetite. Good idea to continue at home. Encouraged pt to have protein supplemental drinks at home too for additional nutrition intake to maintain/gain wt. Pt seemed open to trying.   No meals ordered this admit per health touch. 50% intake of applesauce and yogurt noted yesterday per nursing flow sheet.      PER CHART REVIEW  Admitted for:   Right Obstructing UVJ stone 5x5mm with moderate/marked right hydroureteronephrosis  ELSA  Incidentally noted splenic lesion    Potential surgery Thursday, OR booked currently until then      NUTRITION FOCUSED PHYSICAL ASSESSMENT FOR DIAGNOSING MALNUTRITION)  Yes         Observed:    Muscle wasting (refer to documentation in Malnutrition section)  Subcutaneous fat loss (refer to " "documentation in Malnutrition section)    Obtained from Chart/Interdisciplinary Team:  None noted     ANTHROPOMETRICS  Height: 5' 0\"  Weight: 100 lbs 0 oz  Body mass index is 19.53 kg/m .  Weight Status:  Normal BMI-- borderline underwt!  IBW: 45.5 kg  % IBW: 100%  Weight History: wt has been stable over past year   08/07/23 45.4 kg (100 lb)   08/07/23 44 kg (96 lb 14.4 oz)   08/04/23 45 kg (99 lb 3.2 oz)   08/02/23 44.9 kg (99 lb)   08/01/23 45 kg (99 lb 1.6 oz)   07/27/23 45.2 kg (99 lb 9.6 oz)   07/24/23 44.8 kg (98 lb 12.8 oz)   07/21/23 45 kg (99 lb 1.6 oz)   07/14/23 44.9 kg (99 lb)   07/06/23 45.4 kg (100 lb 1.6 oz)   06/29/23 47.2 kg (104 lb 1.6 oz)   06/19/23 44.8 kg (98 lb 12.8 oz)   06/07/23 45.1 kg (99 lb 6.4 oz)   05/31/23 44.3 kg (97 lb 11.2 oz)   05/22/23 46.3 kg (102 lb)   05/12/23 46.7 kg (103 lb)   04/20/23 46.7 kg (103 lb)   03/09/23 44.7 kg (98 lb 8 oz)   01/11/23 42.2 kg (93 lb 1.6 oz)   01/04/23 43.1 kg (95 lb)   12/21/22 43.2 kg (95 lb 4.8 oz)   12/06/22 44.6 kg (98 lb 4.8 oz)   10/12/22 44.7 kg (98 lb 9.6 oz)   10/05/22 44.4 kg (97 lb 12.8 oz)   10/05/22 44.5 kg (98 lb 1.6 oz)   10/05/22 44.5 kg (98 lb 1.6 oz)   10/04/22 44.2 kg (97 lb 8 oz)   08/31/22 43.4 kg (95 lb 11.2 oz)   08/22/22 43.5 kg (96 lb)   07/20/22 44.5 kg (98 lb)     Care everywhere--  44.5 kg (98 lb 1.7 oz) 01/26/2023     62.1 kg (137 lb) 05/16/2014       LABS   BUN 46.2 (H) 08/08/2023    CR 2.24 (H) 08/08/2023    MAG 1.6 (L) 08/08/2023    ALKPHOS 110 (H) 08/07/2023       MEDICATIONS   pantoprazole  20 mg Oral Daily      sodium chloride 100 mL/hr at 08/08/23 0939       ASSESSED NUTRITION NEEDS PER APPROVED PRACTICE GUIDELINES:  Dosing Weight: 45.4 kg (8/8)  Estimated Energy Needs: 3873-9688+ kcals (30-35+ Kcal/Kg)  Justification: repletion  Estimated Protein Needs: 55-68 grams protein (1.2-1.5 g pro/Kg)  Justification: repletion  Estimated Fluid Needs: 1 mL/Kcal  Justification: maintenance OR per provider pending fluid " status      MALNUTRITION:  % Weight Loss:  None noted. Pt has been borderline underwt for past year   % Intake:  </= 50% for >/= 5 days (severe malnutrition) + suspect small at baseline  Subcutaneous Fat Loss:  Orbital region severe depletion and Upper arm region moderate depletion  Muscle Loss:  Temporal region moderate depletion, Clavicle bone region severe depletion, and Dorsal hand region moderate depletion  Fluid Retention:  None noted    Malnutrition Diagnosis: Severe malnutrition in the context of --  Acute illness or injury  Chronic illness or disease      NUTRITION DIAGNOSIS:  Inadequate oral intake related to poor appetite, early satiety as evidenced by BMI of 19.55, moderate-severe fat and muscle loss, 50% intakes x5 days      NUTRITION INTERVENTIONS  Recommendations / Nutrition Prescription  Ordered admit wt  Ordered TID Ensure Enlive between meals (1 of each flavor)  RD introduced self to pt, discussed role in care. RD provided information about [employer] and encouraged pt to apply. D/w pt her increased protein needs for repletion. Pt would like to try each flavor of Ensure Enlive. Suggested pt have small, frequent meals with poor appetite. Good idea to continue at home. Encouraged pt to have protein supplemental drinks at home too for additional nutrition intake to maintain/gain wt. Pt seemed open to trying.       Implementation  Medical food supplement therapy  Nutrition education   Wt order     Nutrition Goals  Pt to consume >50% of TID meals + TID supplements   Wt >45 kg       MONITORING AND EVALUATION:  Progress towards goals will be monitored and evaluated per protocol and Practice Guidelines      Whitney Aguirre RD, LD   Pager: [phone]

## 2023-08-08 NOTE — PROGRESS NOTES
A&Ox4. VSS on RA. Q4 vitals. [place]. Denies pain & nausea. Voiding adequately, straining urine. Bowel sounds normoactive, passing gas. 2 small BM today. Regular diet, tolerating well; NPO @ midnight. L PIV infusing NS @100 mL/hr. WBC 1.7, Hgb 8.7, Plt 5. Mg protocol, no replacement needed & AM recheck. Platelet transfusion administered x 1, tolerated well. Plan to transfuse 2nd unit prior to urology procedure (possibly tomorrow). Transfer to ; discharge pending.

## 2023-08-08 NOTE — PROGRESS NOTES
[facility]    Medicine Progress Note - Hospitalist Service    Date of Admission:  8/7/2023    Assessment & Plan     Bonny Raymundo is a 77 year old female admitted on 8/7/2023 with obstructing UVJ stone in setting of transfusion dependent myelodysplastic syndrome     Right Obstructing UVJ stone 5x5mm with moderate/marked right hydroureteronephrosis  UA: neg nitrites, small leuk esterase, 14 WBCs. Procal 0.52. WBC 1.4 with   *CT AP: right UVJ obstructing 5x5mm stone with moderate/marked right hydroureteronephrosis  - urology consulted  - NPO at midnight  - IVF   - transfuse platelets prior to procedure  - ceftriaxone 1g q24h given immunocompromised state     Myelodysplastic syndrome, transfusion dependent  Anemia, thrombocytopenia  Follows with [facility] oncology. Seen in clinic 8/7. Hgb after 1L IVF down to 6.2 and platelets 5  - Her scheduled Azacitadine dose was held 8/7 due to presentation above, she will continue to follow up outpatient  - Transfuse 1 unit PRBCs for hemoglobin 7.0-8 if symptomatic, 2 units for hemoglobin < 7.   - Transfuse 1 unit platelets for platelet count <10k or serious bleeding.  - Ordered for 2 units PRBCs on admit 8/7  Hgb at  8.7 on 8/8/23  - Plan to transfuse platelets 8/8 once timing for urologic procedure known  - blood consent completed with patient in ED, she has been type and screened  - continue PTA prophylactic acyclovir BID     ELSA on CKD Stage 3  Baseline Cr 1-1.2. Cr up to 2.45 in clinic, improved to 2.31 after IVF. Suspect will improve more with blood products and once stent placed  - BMP in AM     GERD  Continue PTA PPI     Hearing loss, severe  Noted     Hx PE/DVT  - not on AC due to MDS as above.     Hx SDH/SAH and R hip fracture after fall Jan 2023  Noted'     Incidentally noted splenic lesion  Could be benign splenic hemangioma vs splenic cyst. Measures 1.7x1.5x 2cm  - Follow up with oncology if desired        Diet: NPO per Anesthesia Guidelines for  Procedure/Surgery Except for: Medsnpo at midnight  DVT Prophylaxis: Pneumatic Compression Devices  Mejia Catheter: Not present  Lines: None     Cardiac Monitoring: None  Code Status: No CPR- Do NOT Intubatednr/dni        Clinically Significant Risk Factors Present on Admission           # Hypocalcemia: Lowest Ca = 8.3 mg/dL in last 2 days, will monitor and replace as appropriate     # Hypoalbuminemia: Lowest albumin = 3.4 g/dL at 8/7/2023 12:05 PM, will monitor as appropriate      # Thrombocytopenia: Lowest platelets = 5 in last 2 days, will monitor for bleeding     # Acute Kidney Injury, unspecified: based on a >150% or 0.3 mg/dL increase in last creatinine compared to past 90 day average, will monitor renal function                       Disposition Plan     Expected Discharge Date: 08/09/2023                     Clinically Significant Risk Factors Present on Admission          # Hypocalcemia: Lowest Ca = 8.3 mg/dL in last 2 days, will monitor and replace as appropriate   # Hypomagnesemia: Lowest Mg = 1.6 mg/dL in last 2 days, will replace as needed   # Hypoalbuminemia: Lowest albumin = 3.4 g/dL at 8/7/2023 12:05 PM, will monitor as appropriate   # Thrombocytopenia: Lowest platelets = 5 in last 2 days, will monitor for bleeding  # Acute Kidney Injury, unspecified: based on a >150% or 0.3 mg/dL increase in last creatinine compared to past 90 day average, will monitor renal function                Disposition Plan      Expected Discharge Date: 08/10/2023,  3:00 PM      Discharge Comments: pending URO consult          Marianna Car MD  Hospitalist Service  [employer]  Securely message with [employer] (more info)  Text page via [employer] Paging/Directory   ______________________________________________________________________    Interval History   Pt resting in bed. No acute events since admission. Urology unable find Or time today     Physical Exam   Vital Signs: Temp: 98.8  F (37.1  C) Temp src:  Oral BP: 128/71 Pulse: 72   Resp: 16 SpO2: 94 % O2 Device: None (Room air)    Weight: 100 lbs 1.6 oz    General Appearance: Alert, awake, NAD   Respiratory: CTA b/l   Cardiovascular: RRR  GI: soft, NT< ND< BS+  Skin: warm and dry   Other: No edema      Medical Decision Making       50 MINUTES SPENT BY ME on the date of service doing chart review, history, exam, documentation & further activities per the note.      Data     I have personally reviewed the following data over the past 24 hrs:    1.7 (L)  \   8.7 (L)   / 5 (LL)     139 107 46.2 (H) /  102 (H)   4.6 21 (L) 2.24 (H) \     ALT: N/A AST: N/A AP: N/A TBILI: N/A   ALB: N/A TOT PROTEIN: N/A LIPASE: N/A     Procal: 0.52 (H) CRP: N/A Lactic Acid: 0.9         Imaging results reviewed over the past 24 hrs:   Recent Results (from the past 24 hour(s))   CT Abdomen pelvis w/o contrast   Result Value    Radiologist flags Right obstructing UVJ stone (Urgent)    Narrative    EXAM: CT ABDOMEN PELVIS W/O CONTRAST  8/7/2023 4:35 PM     HISTORY:  New ELSA, R flank pain, urinary frequency, and constipation.;  Urinary frequency; Acute right flank pain       COMPARISON:  Abdominal ultrasound 1/9/2021 and CT 5/11/2017.    TECHNIQUE: Helical technique CT abdomen and pelvis noncontrast; axial  slices with sagittal and coronal reconstructions. Total DLP: mGy*cm.    FINDINGS:  LUNG BASES:  Right greater than left mild/moderate bibasilar subpleural reticular  atelectasis. No pleural effusion. No focal consolidation. No  suspicious pulmonary nodule or mass. Mild scattered bronchiectatic  changes, with a focal fluid-filled/mucus-filled peripheral  subsegmental bronchial in the right lower lobe (series 3 image 39). No  additional significant mucous plugging seen.    ABDOMEN:  Kidneys/ureters/bladder:  Moderate/marked right hydroureteronephrosis with 5 x 5 mm obstructing  ureterovesical junction stone (series 3 image 338). Duplicated  proximal right renal collecting system. No left renal  urinary  collecting system stones. The bladder is grossly normal in appearance.  No left hydronephrosis.    No visualized liver lesion/mass. The gallbladder is unremarkable. A  1.7 x 1.5 x 2.0 cm splenic hypodense lesion, somewhat ill-defined,  which may represent benign splenic hemangioma versus splenic cyst  (coronal series 4 image 63). No visualized focal adrenal lesion/mass.  Normal calibers of large and small bowel. Diverticulosis without  evidence of acute diverticulitis. Moderate colonic stool burden  through to the rectum.    PELVIS:  No significant pelvic free fluid. Normal pelvic structures.    VASCULATURE AND LYMPH NODES:  Multiple mildly prominent presumed reactive mesenteric lymph nodes.  Moderate aortoiliac atherosclerotic calcification.    BONES AND SOFT TISSUES:  No acute osseous abnormality. Right total hip arthroplasty changes.  Degenerative changes of the visualized spine. No aggressive or  suspicious osseous/soft tissue lesion identified.      Impression    IMPRESSION:  1. Right UVJ obstructing 5 x 5 mm stone with moderate/marked right  hydroureteronephrosis.  2. There is a 1.7 x 1.5 x 2.0 cm splenic hypodense lesion, potentially  benign splenic hemangioma versus splenic cyst, new from 2017. Consider  further characterization with IV contrast CT or ultrasound.  3. Diverticulosis without evidence of acute diverticulitis.  4. Mild/moderate right greater than left basilar subpleural  atelectasis.    [Urgent Result: Right obstructing UVJ stone]    Finding was identified on 8/7/2023 5:04 PM.     Jaqui Dan PA-C was contacted by Dr. Clay at 8/7/2023 5:27 PM  and verbalized understanding of the urgent finding.     I have personally reviewed the examination and initial interpretation  and I agree with the findings.    GILBERT DYSON DO         SYSTEM ID:  [id number]

## 2023-08-08 NOTE — PROGRESS NOTES
.RECEIVING UNIT ED HANDOFF REVIEW    ED Nurse Handoff Report was reviewed by: Carmine Rubin RN on August 7, 2023 at 9:45 PM

## 2023-08-08 NOTE — PROGRESS NOTES
MD Notification    Notified Person: MD    Notified Person Name:  Joceline    Notification Date/Time: 8am, 8/8/23    Notification Interaction: vocera    Purpose of Notification: Could you change Tylenol dose to 500mg instead of 650? Patient very particular on Tylenol dosing. Also, labs are back. Low magnesium 1.6, need replacement protocol. Platelets 5 & WBC 1.7. Thank you     Orders Received: Orders placed.    Comments:

## 2023-08-08 NOTE — CONSULTS
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 She is blood and platelet transfusion dependent. PMH is also significant for VTE for which she cannot be anticoagulated due to cytopenias and history of SDH. Hematology is consulted for history of MDS.     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 She has received blood transfusion today with improvement of Hb to 8.7. She is planned for platelet transfusion before/after any possible urologic procedure to which I am in agreement. Platelets have not had significant improvement with outpatient transfusion and she should be closely monitored with CBC with diff with PLT transfusion for PLT <10K. She will need to be closely monitored for bleed and should have repeat immediate imaging if she is to become hypotensive or have evidence of ecchymosis/rosie bleed.   -Patient is chronically neutropenic and is on prophylactic acyclovir. Her ANC has been consistently <500. Will defer expansion of antimicrobials to her primary hematology team.   -No plans for inpatient azacytidine. She will continue to follow outpatient to address if she is eligible for further treatment.   -No immediate action needed for incidental splenic hemangioma.     Thank you for the consultation. Please do not hesitate to contact our team with any further questions or concerns.     Amy Bean,  DO  Hematology/Oncology  [facility]      Total time spent on day of visit, including review of tests, obtaining/reviewing separately obtained history, ordering medications/tests/procedures, communicating with PCP/consultants, and documenting in electronic medical record: 60 minutes. Adequate time was dedicated to patient questions and answered to the expressed satisfaction of the patient and family members.         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 She is blood and platelet transfusion dependent. PMH is also significant for VTE for which she cannot be anticoagulated due to cytopenias and history of SDH. Hematology is consulted for history of MDS.     Patient is known to Dr. Spann with last date of azacytidine on 6/19/23. She was planned for cycle 3 on 8/7/23. She has been receiving both blood and platelet transfusion. It would appear she has felt unwell in recent week. CT A/P without contrast is showing 5x5 mm stone on the right with right-sided HUN. WBC is 1.7, ANC 0.4, Hb 8.7, PLT5K. She has ELSA of 2.24. LFTs are WNL.     Patient denies pain. She denies gross evidence of bleed. She continues on prophylactic acyclovir and is also being treated with ceftriaxone.     Brief Hematologic History:  - Myelodysplastic syndrome with excess blasts-2 (MDS-EB 2).  Bone marrow biopsy 12/15/2022.   IPSS-R score 8.5-very high - based on cytogenetics -7, > 10% blasts, hemoglobin less than 8, platelet , ANC less than 0.8     Final Diagnosis   A.  Bone Marrow Biopsy from left posterior iliac crest and peripheral smear morphology:  - Myelodysplastic syndrome with excess blasts 2 (MDS-EB2) showing:       - Increased marrow blasts (13.3% on imprint smear differential, 12% on flow cytometry).       - Mildly hypercellular marrow for age , 30-35% .      - Moderately increased marrow fibrosis (MF-2).       - No appreciable dysplasia.      - Increased marrow storage iron with 84% sideroblasts, 6% ringed sideroblasts      - Peripheral blood pancytopenia showing:            - Moderate macrocytic, normochromic anemia without evidence of hemolysis or increased red cell regeneration, rare circulating nucleated red blood cell.           - Moderate leukopenia with rare circulating blasts without Bernice rods.           - Moderate to marked thrombocytopenia.   Electronically signed by Rick Bangura MD on 12/19/2022    Cytogenetics-46,XX,-7,+21[20]  Detected Alterations of Known or Potential Pathogenicity: RUNX1 G199E   Detected Alterations of Uncertain Significance: None   Genes with No Detected Clinically Significant Alterations: ABL1, ALK, ASXL1, ATRX, BCOR, CALR, CBL, CEBPA, CSF3R, DNMT3A, ETV6, EZH2, FLT3, GATA1, GATA2, HRAS, IDH1, IDH2, JAK1, JAK2, JAK3, KIT, KMT2A, KRAS, MPL, NF1, NPM1, NRAS, PDGFRA, PDGFRB, PHF6, PTPN11, JAMA, SETBP1, SF1, SF3A1, SF3B1, SH2B3, SRSF2, TET2, TP53, U2AF2, WT1, ZRSR2     She was initially diagnosed as aplastic anemia-diagnosed 9/26/2016, repeat bone marrow biopsy 12/22/16- bone marrow <5% cellular with no dysplasia.  Normal cytogenetics. PNH negative. 1/9/ 2017-treated with ATG, methylprednisolone, cyclosporine, and eltrombopag.  Cyclosporine discontinued May 2021, eltrombopag discontinued June 2021 due to abnormal liver function test.    Partial remission with the immunotherapy, which is gradually weaned.  Now transfusion dependent  - Chronic kidney disease stage IV  - Atrial fibrillation-on apixaban chronically  -History of iron deficiency anemia- Venofer 300 mg IV 7/7/2021, 8/6/2021, 4/14/2022  -History of B12 deficiency  -MGUS  - COPD  - GERD  -History of lower GI bleed 2017  -Severely decreased bilateral hearing  - Benign paroxysmal positional vertigo  - History of unprovoked left leg deep vein thrombosis (DVT)  3/17/21 and bilateral pulmonary embolism (PE) 9/11/12  -  Osteoporosis with compression fracture of spine.  - Shingles right face  2022  - Fall with right hip fracture 1/16/2023  - Subdural hematoma related to fall from standing 1/16/2023-no surgical intervention needed        REVIEW OF SYSTEMS:   A 14 point ROS was reviewed with pertinent positives and negatives in the HPI.      MEDICATIONS:  Current Facility-Administered Medications   Medication    acetaminophen (TYLENOL) tablet 500 mg    acyclovir (ZOVIRAX) tablet 400 mg    cefTRIAXone (ROCEPHIN) 1 g vial to attach to  mL bag for ADULTS or NS 50 mL bag for PEDS    HYDROmorphone (DILAUDID) injection 0.2 mg    HYDROmorphone (DILAUDID) injection 0.4 mg    lidocaine (LMX4) cream    lidocaine 1 % 0.1-1 mL    melatonin tablet 1 mg    naloxone (NARCAN) injection 0.2 mg    Or    naloxone (NARCAN) injection 0.4 mg    Or    naloxone (NARCAN) injection 0.2 mg    Or    naloxone (NARCAN) injection 0.4 mg    ondansetron (ZOFRAN ODT) ODT tab 4 mg    Or    ondansetron (ZOFRAN) injection 4 mg    oxyCODONE (ROXICODONE) tablet 5 mg    oxyCODONE IR (ROXICODONE) half-tab 2.5 mg    pantoprazole (PROTONIX) EC tablet 20 mg    polyethylene glycol (MIRALAX) Packet 17 g    prochlorperazine (COMPAZINE) injection 5 mg    Or    prochlorperazine (COMPAZINE) tablet 5 mg    Or    prochlorperazine (COMPAZINE) suppository 12.5 mg    senna-docusate (SENOKOT-S/PERICOLACE) 8.6-50 MG per tablet 1 tablet    Or    senna-docusate (SENOKOT-S/PERICOLACE) 8.6-50 MG per tablet 2 tablet    sodium chloride (PF) 0.9% PF flush 3 mL    sodium chloride (PF) 0.9% PF flush 3 mL    sodium chloride 0.9% infusion    tamsulosin (FLOMAX) capsule 0.4 mg         ALLERGIES:  Allergies   Allergen Reactions    Cats     Dogs     Seasonal Allergies          PAST MEDICAL HISTORY:  Past Medical History:   Diagnosis Date    Allergic rhinitis due to other allergen     Aplastic anemia (H) 01/09/2017    Closed anterior dislocation of humerus     DVT of lower extremity (deep venous  thrombosis) (H) 10/2012    Left after prolonged sitting-plane    DVT, recurrent, lower extremity, acute (H)     Headache(784.0)     Osteoporosis, unspecified     Pulmonary emboli (H) 10/2012    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    Sensorineural hearing loss, unspecified     Sprain of ankle, unspecified site     L         PAST SURGICAL HISTORY:  Past Surgical History:   Procedure Laterality Date    ARTHRODESIS FOOT  11    Hallux valgus R-1st MP joint    BLEPHAROPLASTY BILATERAL  ,     BONE MARROW BIOPSY, BONE SPECIMEN, NEEDLE/TROCAR N/A 2016    Procedure: BIOPSY BONE MARROW;  Surgeon: Mu Morgan MD;  Location:  GI    BONE MARROW BIOPSY, BONE SPECIMEN, NEEDLE/TROCAR N/A 2016    Procedure: BIOPSY BONE MARROW;  Surgeon: Mu Morgan MD;  Location:  GI    C DEXA INTERPRETATION, AXIAL  03    CATARACT IOL, RT/LT Right     CATARACT IOL, RT/LT Left     COLONOSCOPY N/A 2018    Procedure: COLONOSCOPY;  colonoscopy;  Surgeon: Meliton Castrejon MD;  Location:  GI    EXCHANGE INTRAOCULAR LENS IMPLANT Right 2015    Procedure: EXCHANGE INTRAOCULAR LENS IMPLANT;  Surgeon: Garrett Dawson MD;  Location:  [facility]    PICC INSERTION Left 2017    5fr DL BioFlo PICC, 42cm (2cm external) in the L basilic vein w/ tip in the  SVC RA junction.    VITRECTOMY PARSPLANA WITH 23 GAUGE SYSTEM Right 2015    Procedure: VITRECTOMY PARSPLANA WITH 23 GAUGE SYSTEM;  Surgeon: Racheal Loyd MD;  Location:  EC    ZZC NONSPECIFIC PROCEDURE          ZZC NONSPECIFIC PROCEDURE      hysterectomy/BSO    ZZC NONSPECIFIC PROCEDURE      myomectomy (fibroids)    ZZHC COLONOSCOPY THRU STOMA, DIAGNOSTIC      normal- minimal diverticulosis         SOCIAL HISTORY:  Social History     Socioeconomic History    Marital status:      Spouse name: Not on file    Number of children: 1    Years of education: Not on  file    Highest education level: Not on file   Occupational History    Occupation: purchasing [employer]   Tobacco Use    Smoking status: Former     Types: Cigarettes     Quit date: 1973     Years since quittin.2     Passive exposure: Past    Smokeless tobacco: Never   Vaping Use    Vaping Use: Never used   Substance and Sexual Activity    Alcohol use: No     Alcohol/week: 0.0 standard drinks of alcohol     Comment: occasionally    Drug use: No    Sexual activity: Not Currently     Partners: Male   Other Topics Concern    Not on file   Social History Narrative    Not on file     Social Determinants of Health     Financial Resource Strain: Not on file   Food Insecurity: Not on file   Transportation Needs: Not on file   Physical Activity: Not on file   Stress: Not on file   Social Connections: Not on file   Intimate Partner Violence: Not At Risk (2023)    Humiliation, Afraid, Rape, and Kick questionnaire     Fear of Current or Ex-Partner: No     Emotionally Abused: No     Physically Abused: No     Sexually Abused: No   Housing Stability: Not on file         FAMILY HISTORY:  Family History   Problem Relation Age of Onset    Musculoskeletal Disorder Mother         MS    Heart Disease Father     Heart Disease Brother         afib         PHYSICAL EXAM:  Vital signs:  Temp: 98.8  F (37.1  C) Temp src: Oral BP: 128/71 Pulse: 72   Resp: 16 SpO2: 94 % O2 Device: None (Room air)   Height: 152.4 cm (5') Weight: 45.4 kg (100 lb 1.6 oz)  Estimated body mass index is 19.55 kg/m  as calculated from the following:    Height as of this encounter: 1.524 m (5').    Weight as of this encounter: 45.4 kg (100 lb 1.6 oz).      GENERAL/CONSTITUTIONAL: No acute distress. Pallor.  EYES: Pupils are equal, round, and react to light and accommodation. Extraocular movements intact.  No scleral icterus.  RESPIRATORY: Clear to auscultation bilaterally. No crackles or wheezing.   CARDIOVASCULAR: Regular rate and rhythm without  murmurs, gallops, or rubs.  GASTROINTESTINAL: No hepatosplenomegaly, masses, or tenderness. The patient has normal bowel sounds. No guarding.  No distention.  NEUROLOGIC: Cranial nerves II-XII are intact. Alert, oriented, answers questions appropriately.  INTEGUMENTARY: No rashes or jaundice. No petechiae.        LABS:   Latest Reference Range & Units 08/08/23 07:20   Sodium 136 - 145 mmol/L 139   Potassium 3.4 - 5.3 mmol/L 4.6   Chloride 98 - 107 mmol/L 107   Carbon Dioxide (CO2) 22 - 29 mmol/L 21 (L)   Urea Nitrogen 8.0 - 23.0 mg/dL 46.2 (H)   Creatinine 0.51 - 0.95 mg/dL 2.24 (H)   GFR Estimate >60 mL/min/1.73m2 22 (L)   Calcium 8.8 - 10.2 mg/dL 8.8   Anion Gap 7 - 15 mmol/L 11   Magnesium 1.7 - 2.3 mg/dL 1.6 (L)   Phosphorus 2.5 - 4.5 mg/dL 4.5   Glucose 70 - 99 mg/dL 102 (H)   WBC 4.0 - 11.0 10e3/uL 1.7 (L)   Hemoglobin 11.7 - 15.7 g/dL 8.7 (L)   Hematocrit 35.0 - 47.0 % 26.6 (L)   Platelet Count 150 - 450 10e3/uL 5 (LL)   RBC Count 3.80 - 5.20 10e6/uL 2.85 (L)   MCV 78 - 100 fL 93   MCH 26.5 - 33.0 pg 30.5   MCHC 31.5 - 36.5 g/dL 32.7   RDW 10.0 - 15.0 % 14.6         IMAGING:  CT A/P 8/7/23:  IMPRESSION:  1. Right UVJ obstructing 5 x 5 mm stone with moderate/marked right  hydroureteronephrosis.  2. There is a 1.7 x 1.5 x 2.0 cm splenic hypodense lesion, potentially  benign splenic hemangioma versus splenic cyst, new from 2017. Consider  further characterization with IV contrast CT or ultrasound.  3. Diverticulosis without evidence of acute diverticulitis.  4. Mild/moderate right greater than left basilar subpleural  atelectasis.          Thank you for the opportunity to participate in this patient's care.  Please call with any questions.    Amy Bean DO  Hematology/Oncology  [facility]

## 2023-08-08 NOTE — H&P
[facility]    History and Physical - Hospitalist Service       Date of Admission:  8/7/2023    Assessment & Plan      Bonny Raymundo is a 77 year old female admitted on 8/7/2023 with obstructing UVJ stone in setting of transfusion dependent myelodysplastic syndrome    Right Obstructing UVJ stone 5x5mm with moderate/marked right hydroureteronephrosis  UA: neg nitrites, small leuk esterase, 14 WBCs. Procal 0.52. WBC 1.4 with   *CT AP: right UVJ obstructing 5x5mm stone with moderate/marked right hydroureteronephrosis  - urology consulted  - NPO at midnight  - IVF in AM  - transfuse platelets prior to procedure  - ceftriaxone 1g q24h given immunocompromised state    Myelodysplastic syndrome, transfusion dependent  Anemia, thrombocytopenia  Follows with Health system oncology. Seen in clinic 8/7. Hgb after 1L IVF down to 6.2 and platelets 5  - Her scheduled Azacitadine dose was held 8/7 due to presentation above, she will continue to follow up outpatient  - Transfuse 1 unit PRBCs for hemoglobin 7.0-8 if symptomatic, 2 units for hemoglobin < 7.   - Transfuse 1 unit platelets for platelet count <10k or serious bleeding.  - Ordered for 2 units PRBCs on admit 8/7  - Plan to transfuse platelets 8/8 once timing for urologic procedure known  - blood consent completed with patient in ED, she has been type and screened  - continue PTA prophylactic acyclovir BID    ELSA on CKD Stage 3  Baseline Cr 1-1.2. Cr up to 2.45 in clinic, improved to 2.31 after IVF. Suspect will improve more with blood products and once stent placed  - BMP in AM    GERD  Continue PTA PPI    Hearing loss, severe  Noted    Hx PE/DVT  - not on AC due to MDS as above.    Hx SDH/SAH and R hip fracture after fall Jan 2023  Noted'    Incidentally noted splenic lesion  Could be benign splenic hemangioma vs splenic cyst. Measures 1.7x1.5x 2cm  - Follow up with oncology if desired     Diet: NPO per Anesthesia Guidelines for Procedure/Surgery Except  for: Medsnpo at midnight  DVT Prophylaxis: Pneumatic Compression Devices  Mejia Catheter: Not present  Lines: None     Cardiac Monitoring: None  Code Status: No CPR- Do NOT Intubatednr/dni    Clinically Significant Risk Factors Present on Admission          # Hypocalcemia: Lowest Ca = 8.3 mg/dL in last 2 days, will monitor and replace as appropriate     # Hypoalbuminemia: Lowest albumin = 3.4 g/dL at 8/7/2023 12:05 PM, will monitor as appropriate     # Thrombocytopenia: Lowest platelets = 5 in last 2 days, will monitor for bleeding    # Acute Kidney Injury, unspecified: based on a >150% or 0.3 mg/dL increase in last creatinine compared to past 90 day average, will monitor renal function                Disposition Plan      Expected Discharge Date: 08/09/2023                  Bharati Pillai DO  Hospitalist Service  [facility]  Securely message with G[employer] (more info)  Text page via [facility] Paging/Directory     ______________________________________________________________________    Chief Complaint   Fatigue, right sided flank pain    History is obtained from the patient, prior record review    History of Present Illness   Bonny Raymundo is a 77 year old female who presents from oncology clinic after abnormal CT. She went to oncology clinic for follow-up and reported fatigue, and right sided flank pain. She had transfusion on Friday 8/4 and has felt poorly since. She has been feeling tired and weak, but bothered by pain. She has been constipated as well. She has been urinating frequently and felt that this could be a UTI. While there, they ordered labs including UA and CT scan. She was also given prescription for levaquin. She was urged to present to ED based on lab findings and CT report. While in ED she reports intermittent flank pain and poor oral intake. She can't believe she has a stone, but blames it on her poor appetite/fluid intake. She reports frequent urination. She is worried about  "staying on a normal schedule for getting transfusions ~3 days (tues and Friday), she is worried that this hospital stay will \"screw it all up\".      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    Sensorineural hearing loss, unspecified     Sprain of ankle, unspecified site     L       Past Surgical History   Past Surgical History:   Procedure Laterality Date    ARTHRODESIS FOOT  11    Hallux valgus R-1st MP joint    BLEPHAROPLASTY BILATERAL  ,     BONE MARROW BIOPSY, BONE SPECIMEN, NEEDLE/TROCAR N/A 2016    Procedure: BIOPSY BONE MARROW;  Surgeon: Mu Morgan MD;  [facility]    BONE MARROW BIOPSY, BONE SPECIMEN, NEEDLE/TROCAR N/A 2016    Procedure: BIOPSY BONE MARROW;  Surgeon: Mu Morgan MD;  Location:  GI    C DEXA INTERPRETATION, AXIAL  03    CATARACT IOL, RT/LT Right     CATARACT IOL, RT/LT Left     COLONOSCOPY N/A 2018    Procedure: COLONOSCOPY;  colonoscopy;  Surgeon: Meliton Castrejon MD;  Location:  GI    EXCHANGE INTRAOCULAR LENS IMPLANT Right 2015    Procedure: EXCHANGE INTRAOCULAR LENS IMPLANT;  Surgeon: Garrett Dawson MD;  Location:  EC    PICC INSERTION Left 2017    5fr DL BioFlo PICC, 42cm (2cm external) in the L basilic vein w/ tip in the  SVC RA junction.    VITRECTOMY PARSPLANA WITH 23 GAUGE SYSTEM Right 2015    Procedure: VITRECTOMY PARSPLANA WITH 23 GAUGE SYSTEM;  Surgeon: Racheal Loyd MD;  Location:  EC    ZZC NONSPECIFIC PROCEDURE       "    [facility] NONSPECIFIC PROCEDURE      hysterectomy/BSO    [facility] NONSPECIFIC PROCEDURE      myomectomy (fibroids)    [email or web address] COLONOSCOPY THRU STOMA, DIAGNOSTIC  5-03    normal- minimal diverticulosis       Prior to Admission Medications   Prior to Admission Medications   Prescriptions Last Dose Informant Patient Reported? Taking?   CALCIUM PO 8/7/2023 at AM Self Yes Yes   Sig: Take 1 tablet by mouth daily   UNABLE TO FIND 8/7/2023 at AM Self Yes Yes   Sig: Take by mouth daily MEDICATION NAME: Vitamin K   acetaminophen (TYLENOL) 650 MG CR tablet  at prn Self Yes Yes   Sig: Take 650 mg by mouth every 8 hours as needed for mild pain or fever   acyclovir (ZOVIRAX) 400 MG tablet 8/7/2023 at AM Self No Yes   Sig: Take 1 tablet (400 mg) by mouth 2 times daily   diphenhydrAMINE (BENADRYL) 25 MG tablet  at prn Self Yes Yes   Sig: Take 25 mg by mouth nightly as needed for sleep   levofloxacin (LEVAQUIN) 250 MG tablet Past Week at ON HOLD Self No Yes   Sig: Take 1 tablet (250 mg) by mouth daily   levofloxacin (LEVAQUIN) 250 MG tablet  Self No Yes   Sig: Take one 250 mg tablet with one 500 mg tablet for a total dose of 750 mg today 08/07/23.   levofloxacin (LEVAQUIN) 500 MG tablet  Self No Yes   Sig: Take 750 mg (one 500 mg tablet + one 250 mg tablet) today, 08/07/23. Then take your next dose of 500 mg (1 tablet) on 08/09/23 and another 500 mg (1 tablet) dose on 08/11.   multivitamin w/minerals (THERA-VIT-M) tablet 8/7/2023 at AM Self Yes Yes   Sig: Take 1 tablet by mouth daily   ondansetron (ZOFRAN) 8 MG tablet  at prn Self No Yes   Sig: Take 1 tab (8 mg) by mouth prior to azacitidine, then 1 tab every 8 hours as needed for nausea.   pantoprazole (PROTONIX) 20 MG EC tablet 8/7/2023 at AM Self No Yes   Sig: TAKE 1 TABLET BY MOUTH EVERY DAY   Patient taking differently: Take 20 mg by mouth daily      Facility-Administered Medications: None           Physical Exam   Vital Signs: Temp: 98.2  F (36.8  C) Temp src: Oral BP: 125/61 Pulse: 75    Resp: 18 SpO2: 100 % O2 Device: None (Room air)    Weight: 100 lbs 0 oz    Constitutional: Awake, alert, cooperative, hard of hearing  Respiratory: Clear to auscultation bilaterally, no crackles or wheezing  Cardiovascular: Regular rate and rhythm, normal S1 and S2, and no murmur noted  GI: Normal bowel sounds, soft, non-distended, tender along lateral right side towards flank  Skin/Integumen: No rashes, no cyanosis, no edema  Other:      Medical Decision Making       75 MINUTES SPENT BY ME on the date of service doing chart review, history, exam, documentation & further activities per the note.      Data     I have personally reviewed the following data over the past 24 hrs:    1.4 (L)  \   6.2 (LL)   / 5 (LL)     138 104 45.0 (H) /  95   4.3 22 2.31 (H) \     ALT: 5 AST: 8 AP: 110 (H) TBILI: 0.8   ALB: 3.4 (L) TOT PROTEIN: 8.3 LIPASE: N/A     Procal: 0.52 (H) CRP: N/A Lactic Acid: 0.9       Imaging results reviewed over the past 24 hrs:   Recent Results (from the past 24 hour(s))   CT Abdomen pelvis w/o contrast   Result Value    Radiologist flags Right obstructing UVJ stone (Urgent)    Narrative    EXAM: CT ABDOMEN PELVIS W/O CONTRAST  8/7/2023 4:35 PM     HISTORY:  New ELSA, R flank pain, urinary frequency, and constipation.;  Urinary frequency; Acute right flank pain       COMPARISON:  Abdominal ultrasound 1/9/2021 and CT 5/11/2017.    TECHNIQUE: Helical technique CT abdomen and pelvis noncontrast; axial  slices with sagittal and coronal reconstructions. Total DLP: mGy*cm.    FINDINGS:  LUNG BASES:  Right greater than left mild/moderate bibasilar subpleural reticular  atelectasis. No pleural effusion. No focal consolidation. No  suspicious pulmonary nodule or mass. Mild scattered bronchiectatic  changes, with a focal fluid-filled/mucus-filled peripheral  subsegmental bronchial in the right lower lobe (series 3 image 39). No  additional significant mucous plugging  seen.    ABDOMEN:  Kidneys/ureters/bladder:  Moderate/marked right hydroureteronephrosis with 5 x 5 mm obstructing  ureterovesical junction stone (series 3 image 338). Duplicated  proximal right renal collecting system. No left renal urinary  collecting system stones. The bladder is grossly normal in appearance.  No left hydronephrosis.    No visualized liver lesion/mass. The gallbladder is unremarkable. A  1.7 x 1.5 x 2.0 cm splenic hypodense lesion, somewhat ill-defined,  which may represent benign splenic hemangioma versus splenic cyst  (coronal series 4 image 63). No visualized focal adrenal lesion/mass.  Normal calibers of large and small bowel. Diverticulosis without  evidence of acute diverticulitis. Moderate colonic stool burden  through to the rectum.    PELVIS:  No significant pelvic free fluid. Normal pelvic structures.    VASCULATURE AND LYMPH NODES:  Multiple mildly prominent presumed reactive mesenteric lymph nodes.  Moderate aortoiliac atherosclerotic calcification.    BONES AND SOFT TISSUES:  No acute osseous abnormality. Right total hip arthroplasty changes.  Degenerative changes of the visualized spine. No aggressive or  suspicious osseous/soft tissue lesion identified.      Impression    IMPRESSION:  1. Right UVJ obstructing 5 x 5 mm stone with moderate/marked right  hydroureteronephrosis.  2. There is a 1.7 x 1.5 x 2.0 cm splenic hypodense lesion, potentially  benign splenic hemangioma versus splenic cyst, new from 2017. Consider  further characterization with IV contrast CT or ultrasound.  3. Diverticulosis without evidence of acute diverticulitis.  4. Mild/moderate right greater than left basilar subpleural  atelectasis.    [Urgent Result: Right obstructing UVJ stone]    Finding was identified on 8/7/2023 5:04 PM.     Jaqui Dan PA-C was contacted by Dr. Clay at 8/7/2023 5:27 PM  and verbalized understanding of the urgent finding.     I have personally reviewed the examination and initial  interpretation  and I agree with the findings.    GILBERT DYSON DO         SYSTEM ID:  [id number]

## 2023-08-08 NOTE — CONSULTS
[facility] Inpatient Consultation Note    Bonny Raymundo MRN# [id number]   Age: 77 year old YOB: 1945     Date of Admission:  8/7/2023    Reason for consult: 5mm R UVJ stone       Requesting physician: Dr Pillai                   History of Present Illness:     76 yo F with h/o myelodysplastic syndrome and 5mm right UVJ stone.     Noted right flank pain during oncology clinic yd, so CT was obtained and showing 5x5mm right UVJ stone with moderate right hydronephrosis. She does endorse some flank pain and urinary frequency; denies hematuria, fevers, dysuria, n/v. No prior h/o kidney stones. She has been feeling weak the last few days and not eating/drinking much.     She is transfusion dependent (q 3 days).  Started on Rocephin d/t immunocompromised state.    Upon my evaluation she denies much for pain. Mostly worried about the timing of her transfusions.     Afebrile, vitals stable   WBC 1.7 (h/o MDS)  Cr 2.45 upon admission --> 2.24 this AM. Baseline 1.2.             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) 12/24/2022    Sensorineural hearing loss, unspecified     Sprain of ankle, unspecified site     L             Past Surgical History:     Past Surgical History:   Procedure Laterality Date    ARTHRODESIS FOOT  7-18-11    Hallux valgus R-1st MP joint    BLEPHAROPLASTY BILATERAL  2012, 2014    BONE MARROW BIOPSY, BONE SPECIMEN, NEEDLE/TROCAR N/A 9/26/2016    Procedure: BIOPSY BONE MARROW;  Surgeon: Mu Morgan MD;  Location:  GI    BONE MARROW BIOPSY,  BONE SPECIMEN, NEEDLE/TROCAR N/A 2016    Procedure: BIOPSY BONE MARROW;  Surgeon: Mu Morgan MD;  Location:  GI    C DEXA INTERPRETATION, AXIAL  03    CATARACT IOL, RT/LT Right     CATARACT IOL, RT/LT Left     COLONOSCOPY N/A 2018    Procedure: COLONOSCOPY;  colonoscopy;  Surgeon: Meliton Castrejon MD;  Location:  GI    EXCHANGE INTRAOCULAR LENS IMPLANT Right 2015    Procedure: EXCHANGE INTRAOCULAR LENS IMPLANT;  Surgeon: Garrett Dawson MD;  Location:  [facility]    PICC INSERTION Left 2017    5fr DL BioFlo PICC, 42cm (2cm external) in the L basilic vein w/ tip in the  SVC RA junction.    VITRECTOMY PARSPLANA WITH 23 GAUGE SYSTEM Right 2015    Procedure: VITRECTOMY PARSPLANA WITH 23 GAUGE SYSTEM;  Surgeon: Racheal Loyd MD;  Location:  EC    ZZC NONSPECIFIC PROCEDURE          ZZC NONSPECIFIC PROCEDURE      hysterectomy/BSO    ZZC NONSPECIFIC PROCEDURE      myomectomy (fibroids)    [facility] COLONOSCOPY THRU STOMA, DIAGNOSTIC      normal- minimal diverticulosis             Social History:     Social History     Socioeconomic History    Marital status:      Spouse name: Not on file    Number of children: 1    Years of education: Not on file    Highest education level: Not on file   Occupational History    Occupation: purchasing [employer]   Tobacco Use    Smoking status: Former     Types: Cigarettes     Quit date: 1973     Years since quittin.2     Passive exposure: Past    Smokeless tobacco: Never   Vaping Use    Vaping Use: Never used   Substance and Sexual Activity    Alcohol use: No     Alcohol/week: 0.0 standard drinks of alcohol     Comment: occasionally    Drug use: No    Sexual activity: Not Currently     Partners: Male   Other Topics Concern    Not on file   Social History Narrative    Not on file     Social Determinants of Health     Financial Resource Strain: Not on file   Food Insecurity: Not on file    Transportation Needs: Not on file   Physical Activity: Not on file   Stress: Not on file   Social Connections: Not on file   Intimate Partner Violence: Not At Risk (5/31/2023)    Humiliation, Afraid, Rape, and Kick questionnaire     Fear of Current or Ex-Partner: No     Emotionally Abused: No     Physically Abused: No     Sexually Abused: No   Housing Stability: Not on file             Family History:     Family History   Problem Relation Age of Onset    Musculoskeletal Disorder Mother         MS    Heart Disease Father     Heart Disease Brother         afib             Immunizations:     Immunization History   Administered Date(s) Administered    COVID-19 Bivalent 12+ (Pfizer) 10/12/2022    COVID-19 MONOVALENT 12+ (Pfizer) 02/16/2021, 03/09/2021, 11/12/2021    COVID-19 Monovalent 12+ (Pfizer 2022) 04/14/2022    V2m2-07 Novel Flu P-free 11/20/2019    Influenza (High Dose) 3 valent vaccine 01/21/2014, 12/29/2014, 09/28/2015, 11/15/2017, 09/05/2018, 11/20/2019    Influenza (IIV3) PF 10/05/2012    Influenza Vaccine 65+ (Fluzone HD) 09/16/2020, 12/07/2021, 10/12/2022    Pneumo Conj 13-V (2010&after) 09/28/2015    Pneumococcal 23 valent 02/20/2014    TDAP Vaccine (Adacel) 08/17/2013    Zoster recombinant adjuvanted (SHINGRIX) 08/08/2018             Allergies:     Allergies   Allergen Reactions    Cats     Dogs     Seasonal Allergies              Medications:     Current Facility-Administered Medications   Medication    acetaminophen (TYLENOL) tablet 500 mg    acyclovir (ZOVIRAX) tablet 400 mg    cefTRIAXone (ROCEPHIN) 1 g vial to attach to  mL bag for ADULTS or NS 50 mL bag for PEDS    HYDROmorphone (DILAUDID) injection 0.2 mg    HYDROmorphone (DILAUDID) injection 0.4 mg    lidocaine (LMX4) cream    lidocaine 1 % 0.1-1 mL    melatonin tablet 1 mg    naloxone (NARCAN) injection 0.2 mg    Or    naloxone (NARCAN) injection 0.4 mg    Or    naloxone (NARCAN) injection 0.2 mg    Or    naloxone (NARCAN) injection 0.4 mg     ondansetron (ZOFRAN ODT) ODT tab 4 mg    Or    ondansetron (ZOFRAN) injection 4 mg    oxyCODONE (ROXICODONE) tablet 5 mg    oxyCODONE IR (ROXICODONE) half-tab 2.5 mg    pantoprazole (PROTONIX) EC tablet 20 mg    polyethylene glycol (MIRALAX) Packet 17 g    prochlorperazine (COMPAZINE) injection 5 mg    Or    prochlorperazine (COMPAZINE) tablet 5 mg    Or    prochlorperazine (COMPAZINE) suppository 12.5 mg    senna-docusate (SENOKOT-S/PERICOLACE) 8.6-50 MG per tablet 1 tablet    Or    senna-docusate (SENOKOT-S/PERICOLACE) 8.6-50 MG per tablet 2 tablet    sodium chloride (PF) 0.9% PF flush 3 mL    sodium chloride (PF) 0.9% PF flush 3 mL    sodium chloride 0.9% infusion             Review of Systems:   Comprehensive review of systems from the Admission note dated 8/7/23 at [facility] was reviewed with no changes except per HPI.     Examination:  BP (!) 140/73 (BP Location: Right arm)   Pulse 71   Temp 98.5  F (36.9  C) (Oral)   Resp 18   Wt 45.4 kg (100 lb)   SpO2 97%   BMI 19.53 kg/m    General: Alert and oriented, no distress  HEENT: Face symmetric, mucous membranes moist and pink  Eyes: No scleral icterus  Respiratory: Breathing unlabored, no audible wheezing  Cardiac: Extremities warm and well perfused, no edema  Abdomen soft, non tender, non distended  Back: No CVA or flank tenderness  Extremities: No evidence of deformities or trauma  Neuro:Grossly non focal  Pysch: Normal mood and affect  Skin: No evident rashes or lesions            Data:     Lab Results   Component Value Date    WBC 1.7 08/08/2023    WBC 2.9 07/07/2021     Lab Results   Component Value Date    RBC 2.85 08/08/2023    RBC 2.55 07/07/2021     Lab Results   Component Value Date    HGB 8.7 08/08/2023    HGB 6.9 07/07/2021     Lab Results   Component Value Date    HCT 26.6 08/08/2023    HCT 23.7 07/07/2021     Lab Results   Component Value Date    PLT 5 08/08/2023     07/07/2021     Creatinine   Date Value Ref Range  Status   2023 2.24 (H) 0.51 - 0.95 mg/dL Final   2021 1.55 (H) 0.52 - 1.04 mg/dL Final   ]  Lab Results   Component Value Date    BUN 46.2 2023    BUN 20 2022    BUN 29 2021       Imaging:    CT AP WO 23                                                               IMPRESSION:  1. Right UVJ obstructing 5 x 5 mm stone with moderate/marked right  hydroureteronephrosis.  2. There is a 1.7 x 1.5 x 2.0 cm splenic hypodense lesion, potentially  benign splenic hemangioma versus splenic cyst, new from 2017. Consider  further characterization with IV contrast CT or ultrasound.  3. Diverticulosis without evidence of acute diverticulitis.  4. Mild/moderate right greater than left basilar subpleural  atelectasis.    Impression:  76 yo F with h/o myelodysplastic syndrome admitted with 5mm UVJ stone, ELSA.     Plan:  - Vitals stable, minimal pain, UA indeterminate. Given stone  into bladder, suspect good likelihood of passage with medical expulsive therapy alone.   - Continue IVF, start Flomax daily, strain urine   - Follow Ucx, on empiric abx for now. Monitor for fever.   - Ideally would schedule for cysto, possible stone removal, possible stent tomorrow afternoon (Dr Nettles) and pursue surgery if no stone passage and/or Cr not improving by that time. Unfortunately, the OR has told our scheduling team no space for add on cases for cystoscopic cases today  or tomorrow .   - Per OR, may be able to add on for surgery Thursday afternoon (would likely with Dr Lujan). I will continue to work with OR, may tentatively schedule for Thursday if that is the earliest time we can get. Again, possible that patient will pass stone prior to this.   - If Cr improving and Ucx negative tomorrow, could also be reasonable to discharge and follow up with urology to check for stone passage and consider outpatient surgery.    - Diet ordered for today    Case discussed with Dr Nettles. Will update  as I know more re: surgical scheduling.     Sarah Westbrook PA-C  [facility]   Pager: [phone]  Office: [phone]

## 2023-08-08 NOTE — PROVIDER NOTIFICATION
MD Notification    Notified Person: MD    Notified Person Name: Dr. Car    Notification Date/Time: 10:16, 8/8/23    Notification Interaction: vocera message    Purpose of Notification:     Blood bank called & they do not want me transfusing platelets until closer to the procedure if that is happening today/tomorrow. Urology has not come by yet so I am not sure when/if that is happening. Blood bank said for any questions to discuss this you can call their Blood Bank MD *[phone]. I will wait to see what Urology says for now. Thank you     Orders Received: Wait Urology recommendations.     Per Dr. Car 12:30pm: Plan to transfuse platelets before urology procedure (likely tomorrow) unless other recommendations by Hematology.    Comments:

## 2023-08-09 NOTE — PROGRESS NOTES
Hematology Chart Check  - Interval events noted including plan for cysto tomorrow AM  - Hgb improved to 8.6 and platelets up to 42 with transfusions  - Would continue to transfuse pre-operatively; most low risk procedures can proceed with platelet goal of 50K; she has not responded well to transfusions in the past but seems to recently; defer final platelet threshold to urologic surgical team  Monse Wayne PA-C (Cell: [phone])

## 2023-08-09 NOTE — PROGRESS NOTES
"   08/09/23 1521   Appointment Info   Signing Clinician's Name / Credentials (PT) Marlena Mukherjee PT, DPT   Living Environment   People in Home alone   Current Living Arrangements house   Home Accessibility stairs within home   Living Environment Comments Reports no stairs to enter, but stairs to the basement. \"I only use them once a week or less.\"  Laundry is in the basement. States she uses [employer] for trasport to her appointments, orders groceries and has them delivered.  Reports \"I don't do much.\" \"My condition makes me tired and I can't do very much.\"   Self-Care   Usual Activity Tolerance moderate   Current Activity Tolerance fair   Regular Exercise Yes   Activity/Exercise Type walking   Exercise Amount/Frequency other (see comments)  (\"I try every day.\")   Equipment Currently Used at Home cane, straight   Fall history within last six months yes   Number of times patient has fallen within last six months 1  (\"I tripped on the garden hose.\")   Activity/Exercise/Self-Care Comment Reports she tries to walk a block a day. Takes her cane with her. Has a shower bench for bathing. Tub/shower with shower doors. Reports she gets herself dressed and ready independently. Asked about medication management, pt states \"I'm not sure.\" \"[facility] and [facility].   General Information   Onset of Illness/Injury or Date of Surgery 08/07/23   Referring Physician Marianna Car MD   Patient/Family Therapy Goals Statement (PT) Tearful with the mention of rehab.   Pertinent History of Current Problem (include personal factors and/or comorbidities that impact the POC) 77 year old female admitted on 8/7/2023 with obstructing UVJ stone in setting of transfusion dependent myelodysplastic syndrome.   Existing Precautions/Restrictions fall   General Observations Supine in bed, states \"I\"m [employer], you are going to have to stay close and talk loud.\"   Cognition   Affect/Mental Status (Cognition) " "agitated;sad/depressed affect;flat/blunted affect   Orientation Status (Cognition) oriented x 3   Follows Commands (Cognition) follows one-step commands;over 90% accuracy;delayed response/completion;increased processing time needed;repetition of directions required;verbal cues/prompting required;physical/tactile prompts required   Behavioral Issues overwhelmed easily   Safety Deficit (Cognition) insight into deficits/self-awareness;impulsivity;minimal deficit   Cognitive Status Comments Pt states 'I can't remember,\" a few times during session. Very focused on her MDS and states \" You know, I'm not going to live very long don't you.\"   Pain Assessment   Patient Currently in Pain No   Integumentary/Edema   Integumentary/Edema Comments LEs with pitting edema at the sock line.   Posture    Posture Forward head position;Protracted shoulders;Kyphosis   Range of Motion (ROM)   ROM Comment B LE s and UEs WFL   Strength (Manual Muscle Testing)   Strength Comments B LEs adn UEs with antigravity strength as dmeonstrated with functional mobility and AROM. Pt with fatigue and impaired activity tolerance.   Bed Mobility   Comment, (Bed Mobility) Mod I use of bed rail sup>sit , flat bed.   Transfers   Comment, (Transfers) Sit>stand close supervision, good weight shift.   Gait/Stairs (Locomotion)   Comment, (Gait/Stairs) Bedside gait with walker, lateral sway and narrow APURVA with occasional scissoring steps.   Balance   Balance Comments Seated balance, static good. dynamic sitting SBA. Note pt with a resting nystagmus -eyes are shift L and R, very subtle, but constant. Standing static, slight increase in sway, dynamic balance needs at least single UE support.   Clinical Impression   Criteria for Skilled Therapeutic Intervention Yes, treatment indicated   PT Diagnosis (PT) Impaired Gait   Influenced by the following impairments Generalized weakness and fatigue, impaired activity tolerance, decreased balance.   Functional limitations " due to impairments Decreased functional independence with mobility, fall risk.   Clinical Presentation (PT Evaluation Complexity) Stable/Uncomplicated   Clinical Presentation Rationale See MR   Clinical Decision Making (Complexity) low complexity   Planned Therapy Interventions (PT) balance training;bed mobility training;gait training;home exercise program;neuromuscular re-education;patient/family education;ROM (range of motion);stair training;strengthening;stretching;transfer training;progressive activity/exercise;risk factor education;home program guidelines   Risk & Benefits of therapy have been explained evaluation/treatment results reviewed;care plan/treatment goals reviewed;risks/benefits reviewed;current/potential barriers reviewed;participants voiced agreement with care plan;participants included;patient   PT Total Evaluation Time   PT Eval, Low Complexity Minutes (02413) 10   Physical Therapy Goals   PT Frequency Daily   PT Predicted Duration/Target Date for Goal Attainment 08/15/23   PT Goals Bed Mobility;Transfers;Gait;Stairs;PT Goal 1   PT: Bed Mobility Independent;Supine to/from sit;Rolling  (flat bed as per home set up)   PT: Transfers Modified independent;Sit to/from stand;Bed to/from chair;Assistive device   PT: Gait Modified independent;Assistive device;Greater than 200 feet   PT: Stairs Supervision/stand-by assist;Assistive device;10 stairs;Rail on both sides   PT: Goal 1 Pt will complete the TUG, with use of LRAD, in 13.5 seconds or less, with indication of decreased fallrisk.   Interventions   Interventions Quick Adds Gait Training;Therapeutic Activity;Therapeutic Procedure   Therapeutic Procedure/Exercise   Treatment Detail/Skilled Intervention Supine SLR alternating, APs. Edu on use for circulation and reduced stiffness, in prep for OOB and when at rest during commercials. Cued to breathe with activity.   Therapeutic Activity   Therapeutic Activities: dynamic activities to improve functional  "performance Minutes (76453) 10   Treatment Detail/Skilled Intervention Edu pt on need for OOB, including up to chair 3x per day and walks. Chair set up for dinner, pt states she will call to get up at dinner time, \"I'll try to ask for a walk too.\" White board updated, discused with RN for reminders.   Gait Training   Gait Training Minutes (99383) 10   Treatment Detail/Skilled Intervention Gait  training included cues for posture, cane sequence, step length and encoruged rests. Ambulated 100'with single end cane, scissoring steps, path deviation, CGA to guide at gait belt. STart/stop and pivot turns with increased sway, but no acute LOB. Pt uses her \"hurry cane,\" from home.   PT Discharge Planning   PT Plan Gait and stairs, balance TUG as able Pt really would prefer home vs TCU, but is alone at home.   PT Discharge Recommendation (DC Rec) Transitional Care Facility   PT Rationale for DC Rec Pt is unsteady, somwhat impulsive and admits to \"dizzy spells\" sometimes. Reports she goes to the basement less than once a week to do her laundry. Presents today with need for close supervision to CGA with mobility, unstable gait. Would benefit from TCU for ongoing strength and act linh progression for optimal safety and independence withmobility prior to return home. If pt declines TCU recommend 24/7 supervision for ADLs and stair management as well as home PT for home safety evaluation and ongoing strength and balance benefits.   PT Brief overview of current status Close supervision with bed mobility, transfers. CGA with ambualtion using single end cane.   Total Session Time   Timed Code Treatment Minutes 20   Total Session Time (sum of timed and untimed services) 30     "

## 2023-08-09 NOTE — PLAN OF CARE
[id number] 8/9/23  A/Ox4 VSS ex HTN, and on RA. SBA with cane, and is continent of B/B-urine to be collected and strained to check for stones. On reg diet with good appetite-Pt will be NPO at midnight for procedure tomorrow AM. Patient denies any pain or N/V. LPIV -SL. On Mag protocol-replacement done this shift. Plan to transfuse platelets prior to surgery tomorrow. Pt ambulated around the unit x2. Discharge pending improvement.     *Transfuse 1 unit PRBCs for hemoglobin 7.0-8 if symptomatic, 2 units for hemoglobin < 7.   *Transfuse 1 unit platelets for platelet count <10k or serious bleeding.

## 2023-08-09 NOTE — PROGRESS NOTES
[facility]    Medicine Progress Note - Hospitalist Service    Date of Admission:  8/7/2023    Assessment & Plan     Bonny Raymundo is a 77 year old female admitted on 8/7/2023 with obstructing UVJ stone in setting of transfusion dependent myelodysplastic syndrome     Right Obstructing UVJ stone 5x5mm with moderate/marked right hydroureteronephrosis  UA: neg nitrites, small leuk esterase, 14 WBCs. Procal 0.52. WBC 1.4 with   *CT AP: right UVJ obstructing 5x5mm stone with moderate/marked right hydroureteronephrosis  - urology consulted planning on procedure tomorrow on 8/10/23  - NPO at midnight  - transfuse platelets prior to procedure  - ceftriaxone 1g q24h given immunocompromised state  Started on flomax. Strain urine      Myelodysplastic syndrome, transfusion dependent  Anemia, thrombocytopenia  Follows with [facility] oncology. Seen in clinic 8/7. Hgb after 1L IVF down to 6.2 and platelets 5  - Her scheduled Azacitadine dose was held 8/7 due to presentation above, she will continue to follow up outpatient  - Transfuse 1 unit PRBCs for hemoglobin 7.0-8 if symptomatic, 2 units for hemoglobin < 7.   - Transfuse 1 unit platelets for platelet count <10k or serious bleeding.  - Ordered for 2 units PRBCs on admit 8/7  Hgb at  8.7 on 8/8/23  - Plan to transfuse platelets 8/8 once timing for urologic procedure known  - blood consent completed with patient in ED, she has been type and screened  - continue PTA prophylactic acyclovir BID  Hgb at 8.6 and Platelet count at 42K on 8/9/23      ELSA on CKD Stage 3  Baseline Cr 1-1.2. Cr up to 2.45 in clinic, improved to 2.31 after IVF. Suspect will improve more with blood products and once stent placed  - BMP in AM  Cr improved to 1.64 on 8/9/23. Hold iVF today      GERD  Continue PTA PPI     Hearing loss, severe  Noted     Hx PE/DVT  - not on AC due to MDS as above.     Hx SDH/SAH and R hip fracture after fall Jan 2023  Noted'     Incidentally noted splenic  lesion  Could be benign splenic hemangioma vs splenic cyst. Measures 1.7x1.5x 2cm  - Follow up with oncology if desired        Diet: NPO per Anesthesia Guidelines for Procedure/Surgery Except for: Medsnpo at midnight  DVT Prophylaxis: Pneumatic Compression Devices  Mejia Catheter: Not present  Lines: None     Cardiac Monitoring: None  Code Status: No CPR- Do NOT Intubatednr/dni        Clinically Significant Risk Factors Present on Admission           # Hypocalcemia: Lowest Ca = 8.3 mg/dL in last 2 days, will monitor and replace as appropriate     # Hypoalbuminemia: Lowest albumin = 3.4 g/dL at 8/7/2023 12:05 PM, will monitor as appropriate      # Thrombocytopenia: Lowest platelets = 5 in last 2 days, will monitor for bleeding     # Acute Kidney Injury, unspecified: based on a >150% or 0.3 mg/dL increase in last creatinine compared to past 90 day average, will monitor renal function                           Clinically Significant Risk Factors          # Hypocalcemia: Lowest Ca = 8.3 mg/dL in last 2 days, will monitor and replace as appropriate   # Hypomagnesemia: Lowest Mg = 1.4 mg/dL in last 2 days, will replace as needed   # Hypoalbuminemia: Lowest albumin = 3.4 g/dL at 8/7/2023 12:05 PM, will monitor as appropriate     # Thrombocytopenia: Lowest platelets = 5 in last 2 days, will monitor for bleeding             # Severe Malnutrition: based on nutrition assessment, PRESENT ON ADMISSION        Disposition Plan      Expected Discharge Date: 08/11/2023,  3:00 PM      Discharge Comments: pending URO consult          Marianna Car MD  Hospitalist Service  [facility]  Securely message with [employer] (more info)  Text page via [employer] Paging/Directory   ______________________________________________________________________    Interval History   Pt resting in bed. No acute events since admission. Urology unable find Or time today planning procedure tomorrow. She wants to eat     Physical Exam   Vital  Signs: Temp: 98  F (36.7  C) Temp src: Oral BP: (!) 142/76 Pulse: 62   Resp: 16 SpO2: 95 % O2 Device: None (Room air)    Weight: 100 lbs 1.6 oz    General Appearance: Alert, awake, NAD   Respiratory: CTA b/l   Cardiovascular: RRR  GI: soft, NT< ND< BS+  Skin: warm and dry   Other: No edema      Medical Decision Making       50 MINUTES SPENT BY ME on the date of service doing chart review, history, exam, documentation & further activities per the note.      Data     I have personally reviewed the following data over the past 24 hrs:    1.4 (L)  \   8.6 (L)   / 42 (LL)     141 109 (H) 40.1 (H) /  102 (H)   4.2 22 1.64 (H) \       Imaging results reviewed over the past 24 hrs:   No results found for this or any previous visit (from the past 24 hour(s)).

## 2023-08-09 NOTE — PROGRESS NOTES
UROLOGY PROGRESS NOTE    HPI/SUBJECTIVE:   The patient is very frustrated this morning, noting that she has been sleeping well. She is scheduled for cysto, possible URS/HLL, stent placement with Dr. Lujan tomorrow AM. The patient denies pain however notes that she has urinary frequency and urgency, voiding every 1 hour. Urine is being strained, no report of stone at this time. Urine culture without specific bacterial growth.     Creatinine: 2.24 --> 1.64   PLT: 42  HGB: 8.6     ROS:     OBJECTIVE:          BP (!) 142/76 (BP Location: Right arm)   Pulse 62   Temp 98  F (36.7  C) (Oral)   Resp 16   Ht 1.524 m (5')   Wt 45.4 kg (100 lb 1.6 oz)   SpO2 95%   BMI 19.55 kg/m      Intake/Output Summary (Last 24 hours) at 8/9/2023 1037  Last data filed at 8/9/2023 0838  Gross per 24 hour   Intake 708 ml   Output 1850 ml   Net -1142 ml            General appearance shows no deformaties and good grooming.  EYES: no icterus  HEAD, EARS, NOSE, MOUTH, AND THROAT: atraumatic, normocephalic  NECK: symmetric  CHEST WALL: symmetric  CARDIAC: skin well perfused  RESPIRATORY: breathing unlabored  ABDOMEN: soft, non tender, non distended, no rebound or guarding      ASSESSMENT:   78 yo F with h/o myelodysplastic syndrome admitted with 5mm UVJ stone, ELSA.      PLAN:    -Continue to strain urine, Flomax   -Plan for procedure tomorrow AM with Dr. Lujan (given OR availability)   -NPO at midnight  -Trend labs  -Transfusion, supportive cares per SERVANDO Polo PA-C  [facility]  [phone]  [employer] ([email or web address])   Text Page (7:30am to 4:30pm)

## 2023-08-09 NOTE — PLAN OF CARE
Goal Outcome Evaluation:    Orientation: A&Ox4  Activity: SBA w/ cane  Diet/BS Checks: Reg, NPO at 0000  Tele: N/A  IV Access/Drains: L PIV infusing NS at 100mL/hr  Pain Management: PRN tylenol, IV dilaudid, oxycodone   Abnormal VS/Results: WBC 1.7, hgb 8.7, plt 5, ANC 0.4, on mg replacement protocol.   Bowel/Bladder: Cont of b/b  Skin/Wounds:   Consults: Hem/onc  D/C Disposition: TBD    Other Info:    NPO at 8/9 0000 for possible urologic procedure today   - Transfuse 1 unit PRBCs for hemoglobin 7.0-8 if symptomatic, 2 units for hemoglobin < 7.   - Transfuse 1 unit platelets for platelet count <10k or serious bleeding.

## 2023-08-09 NOTE — PROGRESS NOTES
[id number]    Patient vital signs are at baseline: Yes  Patient able to ambulate as they were prior to admission or with assist devices provided by therapies during their stay:  Yes  Patient MUST void prior to discharge:  Yes  Patient able to tolerate oral intake:  Yes  Pain has adequate pain control using Oral analgesics:  Yes  Does patient have an identified :  Yes  Has goal D/C date and time been discussed with patient:  No,  Reason:  TBD pending surgery     Pt is alert and oriented x 4,  VSS on RA, IVF @ 100 ml/hr,  neutropenic precaution, SBA with the the IV pole, Continent of bowel and bladder, NPO @ midnight. Denied having any pain or discomfort. Urine is being strain for visible  kidney stones. Possible surgery scheduled for tomorrow.

## 2023-08-09 NOTE — PROVIDER NOTIFICATION
MD Notification    Notified Person: MD    Notified Person Name: Dr. Marianna Car    Notification Date/Time: 2:21 PM 08/09/23     Notification Interaction: [employer]    Purpose of Notification: Pt has home med of levofloxacin (750mg). It is prescribed for today and Friday and she wants to know if she can take it? I called pharmacy and they wanted you to look it over and ok it or not. Pt has been on a diff antibiotic while here.     Orders Received: She is getting an antibiotic here so she does not need the levofloxacin.    Comments:

## 2023-08-10 NOTE — OP NOTE
Procedure Date: 08/10/2023    PREOPERATIVE DIAGNOSES:    1.  Right flank pain.  2.  5 mm obstructing right distal stone.  3.  Right hydronephrosis, with slightly elevated creatinine.    POSTOPERATIVE DIAGNOSES:  1.  The stone has apparently passed.  2.  Edematous distal urine.    PROCEDURE:  Cystoscopy, right retrograde and right ureteroscopy, no stones remaining.    ANESTHESIA:  General.    SURGEON:  Jacob uLjan M.D.    ESTIMATED BLOOD LOSS:  None.    SUMMARY OF FINDINGS:  Edematous right ureteral orifice.  Stone spontaneously passed.  Edematous right distal ureter.    BRIEF HISTORY AND PHYSICAL:  The patient is a 77-year-old female who was admitted with right flank pain, right hydronephrosis and an elevated creatinine.  Her CT scan demonstrated the presence of a 5 mm right distal stone with proximal right hydroureter and mild hydronephrosis.  She was initially treated conservatively with analgesics, and her pain slowly improved.  She has not captured a stone yet.  Her creatinine had been as high as 2.4, has improved to 1.29.  Her pain has improved.  Because of her history, she is now scheduled for further evaluation.  Procedure thoroughly explained to the patient.  All questions were answered to her satisfaction, and she has elected to proceed.    DESCRIPTION OF PROCEDURE:  Proper consents were obtained and signed.  The patient was taken to the operating room.  Under general anesthesia, she was prepped and draped in sterile fashion.  A 22-Turkish Storz cystoscope was gently passed through urethra, which was normal.  The left orifice was normal.  The right orifice was markedly edematous, demonstrating either a recently passed stone or that the stone was still in the distal ureter.  A right retrograde was done with an open-ended catheter, which demonstrated adequate filling of the ureter and collecting system.  No obvious stone could be seen.  An Amplatz Super Stiff wire was placed through the open-ended catheter  and up into the kidney under fluoroscopic guidance.  A rigid ureteroscope was then passed alongside the wire and up the ureter, and there were no remaining stones noted up to the level of the UPJ.  The scope was then removed.  The wire also was removed.  Her bladder was emptied.  She was taken to recovery in stable condition.  She is stable from a urological standpoint and will be discharged per the medical service.    Jacob Lujan MD        D: 08/10/2023   T: 08/10/2023   MT: [email or web address]    Name:     LISSETH PARIKHCaitlyn  MRN:      [id number]        Account:        [id number]   :      1945           Procedure Date: 08/10/2023     Document: [id number]

## 2023-08-10 NOTE — PROVIDER NOTIFICATION
MD Notification    Notified Person: MD    Notified Person Name: Dr. Marianna Car    Notification Date/Time: 10:38 AM 08/10/23     Notification Interaction: [employer] Messaging    Purpose of Notification:     Pt is back from surgery, she is wondering if she can eat? Also, should I transfuse platelets right away?        Orders Received: yes she can eat reg diet, and transfuse 1 unit    Comments:

## 2023-08-10 NOTE — PROGRESS NOTES
[facility]    Medicine Progress Note - Hospitalist Service    Date of Admission:  8/7/2023    Assessment & Plan     Bonny Raymundo is a 77 year old female admitted on 8/7/2023 with obstructing UVJ stone in setting of transfusion dependent myelodysplastic syndrome     Right Obstructing UVJ stone 5x5mm with moderate/marked right hydroureteronephrosis she is S/p Cystoscopy, right retrograde and right ureteroscopy, no stones remaining edematous distal ureter seen .   UA: neg nitrites, small leuk esterase, 14 WBCs. Procal 0.52. WBC 1.4 with   *CT AP: right UVJ obstructing 5x5mm stone with moderate/marked right hydroureteronephrosis  - urology consulted, underwent  procedure  on 8/10/23  Urine culture <10K urgenital baldemar   Stopped ceftriaxone   Urology signed off   Regular diet ordered     Myelodysplastic syndrome, transfusion dependent  Anemia, thrombocytopenia  Follows with [facility] oncology. Seen in clinic 8/7. Hgb after 1L IVF down to 6.2 and platelets 5  - Her scheduled Azacitadine dose was held 8/7 due to presentation above, she will continue to follow up outpatient  - Transfuse 1 unit PRBCs for hemoglobin 7.0-8 if symptomatic, 2 units for hemoglobin < 7.   - Transfuse 1 unit platelets for platelet count <10k or serious bleeding.  - Ordered for 2 units PRBCs on admit 8/7  Hgb at  8.7 on 8/8/23  - Plan to transfuse platelets 8/8 once timing for urologic procedure known  - blood consent completed with patient in ED, she has been type and screened  - continue PTA prophylactic acyclovir BID  Hgb at 8.6 and Platelet count at 24K   1unit platelets today      ELSA on CKD Stage 3  Baseline Cr 1-1.2. Cr up to 2.45 in clinic, improved to 2.31 after IVF. Suspect will improve more with blood products and once stent placed  - BMP in AM  Cr improved to 1.64-1.29 on 8/10/23.   Stpped IVF      GERD  Continue PTA PPI     Hearing loss, severe  Noted     Hx PE/DVT  - not on AC due to MDS as above.     Hx SDH/SAH  and R hip fracture after fall Jan 2023  Noted'     Incidentally noted splenic lesion  Could be benign splenic hemangioma vs splenic cyst. Measures 1.7x1.5x 2cm  - Follow up with oncology if desired        Diet: regular diet   DVT Prophylaxis: Pneumatic Compression Devices  Mejia Catheter: Not present  Lines: None     Cardiac Monitoring: None  Code Status: No CPR- Do NOT Intubatednr/dni        Clinically Significant Risk Factors Present on Admission           # Hypocalcemia: Lowest Ca = 8.3 mg/dL in last 2 days, will monitor and replace as appropriate     # Hypoalbuminemia: Lowest albumin = 3.4 g/dL at 8/7/2023 12:05 PM, will monitor as appropriate      # Thrombocytopenia: Lowest platelets = 5 in last 2 days, will monitor for bleeding     # Acute Kidney Injury, unspecified: based on a >150% or 0.3 mg/dL increase in last creatinine compared to past 90 day average, will monitor renal function                           Clinically Significant Risk Factors            # Hypomagnesemia: Lowest Mg = 1.4 mg/dL in last 2 days, will replace as needed   # Hypoalbuminemia: Lowest albumin = 3.4 g/dL at 8/7/2023 12:05 PM, will monitor as appropriate     # Thrombocytopenia: Lowest platelets = 24 in last 2 days, will monitor for bleeding             # Severe Malnutrition: based on nutrition assessment  , PRESENT ON ADMISSION      Pt refusing to go to tcu wants to go home with [facility] services     Disposition Plan      Expected Discharge Date: 08/11/2023        Discharge Comments:        Marianna Car MD  Hospitalist Service  [facility]  Securely message with [employer] (more info)  Text page via [employer] Paging/Directory   ______________________________________________________________________    Interval History   Pt resting in bed. No acute events since admission.   Physical Exam   Vital Signs: Temp: 98  F (36.7  C) Temp src: Temporal BP: (!) 143/77 Pulse: 66   Resp: 16 SpO2: 97 % O2 Device: None (Room air) Oxygen  Delivery: 2 LPM  Weight: 100 lbs 1.6 oz    General Appearance: Alert, awake, NAD   Respiratory: CTA b/l   Cardiovascular: RRR  GI: soft, NT< ND< BS+  Skin: warm and dry   Other: No edema      Medical Decision Making       50 MINUTES SPENT BY ME on the date of service doing chart review, history, exam, documentation & further activities per the note.      Data     I have personally reviewed the following data over the past 24 hrs:    1.6 (L)  \   8.6 (L)   / 24 (LL)     138 107 39.2 (H) /  90   4.2 19 (L) 1.29 (H) \       Imaging results reviewed over the past 24 hrs:   Recent Results (from the past 24 hour(s))   XR Surgery ZEFERINO L/T 5 Min Fluoro w Stills    Narrative    This exam was marked as non-reportable because it will not be read by a   radiologist or a [facility] non-radiologist provider.

## 2023-08-10 NOTE — ANESTHESIA PREPROCEDURE EVALUATION
Anesthesia Pre-Procedure Evaluation    Patient: Bonny Raymundo   MRN: [id number] : 1945        Procedure : Procedure(s):  CYSTOSCOPY, POSSIBLE RIGHT URETEROSCOPY,POSSIBLE HOLIUM LASER LITHOTRIPSY,POSSIBLE RIGHT STENT PLACEMENT          Past Medical History:   Diagnosis Date    Allergic rhinitis due to other allergen     Aplastic anemia (H) 2017    Closed anterior dislocation of humerus     DVT of lower extremity (deep venous thrombosis) (H) 10/2012    Left after prolonged sitting-plane    DVT, recurrent, lower extremity, acute (H)     Headache(784.0)     Osteoporosis, unspecified     Pulmonary emboli (H) 10/2012    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    Sensorineural hearing loss, unspecified     Sprain of ankle, unspecified site     L      Past Surgical History:   Procedure Laterality Date    ARTHRODESIS FOOT  11    Hallux valgus R-1st MP joint    BLEPHAROPLASTY BILATERAL  ,     BONE MARROW BIOPSY, BONE SPECIMEN, NEEDLE/TROCAR N/A 2016    Procedure: BIOPSY BONE MARROW;  Surgeon: Mu Morgan MD;  Location: [facility]    BONE MARROW BIOPSY, BONE SPECIMEN, NEEDLE/TROCAR N/A 2016    Procedure: BIOPSY BONE MARROW;  Surgeon: Mu Morgan MD;  Location:  GI    C DEXA INTERPRETATION, AXIAL  820-    CATARACT IOL, RT/LT Right     CATARACT IOL, RT/LT Left     COLONOSCOPY N/A 2018    Procedure: COLONOSCOPY;  colonoscopy;  Surgeon: Meliton Castrejon MD;  Location:  GI    EXCHANGE INTRAOCULAR LENS IMPLANT Right 2015    Procedure: EXCHANGE INTRAOCULAR LENS IMPLANT;  Surgeon: Garrett Dawson MD;  Location:  EC    PICC INSERTION Left 2017    5fr DL BioFlo PICC, 42cm (2cm external) in the L basilic vein w/ tip in the  SVC RA junction.    VITRECTOMY PARSPLANA WITH 23 GAUGE SYSTEM Right 2015    Procedure: VITRECTOMY PARSPLANA WITH 23 GAUGE SYSTEM;  Surgeon: Racheal Loyd MD;  Location:   EC    ZZC NONSPECIFIC PROCEDURE          ZZC NONSPECIFIC PROCEDURE      hysterectomy/BSO    ZZC NONSPECIFIC PROCEDURE      myomectomy (fibroids)    ZZHC COLONOSCOPY THRU STOMA, DIAGNOSTIC      normal- minimal diverticulosis      Allergies   Allergen Reactions    Cats     Dogs     Seasonal Allergies       Social History     Tobacco Use    Smoking status: Former     Types: Cigarettes     Quit date: 1973     Years since quittin.2     Passive exposure: Past    Smokeless tobacco: Never   Substance Use Topics    Alcohol use: No     Alcohol/week: 0.0 standard drinks of alcohol     Comment: occasionally      Wt Readings from Last 1 Encounters:   23 45.4 kg (100 lb 1.6 oz)        Anesthesia Evaluation   Pt has had prior anesthetic. Type: General and MAC.    No history of anesthetic complications       ROS/MED HX  ENT/Pulmonary:     (+)           allergic rhinitis,              COPD (per H&P, but the patient denies.  No inhalers.),           (-) sleep apnea   Neurologic: Comment: BPPV  SDH/SAH after fall 2023.  No CVA sxms from this and minor per patient.    (+)          CVA, date: several yrs ago per patient, without deficits,                    Cardiovascular:     (+)  - -   -  - -                                 Previous cardiac testing   Echo: Date:  Results:     Name: LISSETH PARIKH  MRN: [id number]  : 1945  Study Date: 2020 01:01 PM  Age: 74 yrs  Gender: Female  Patient Location: [facility]  Reason For Study: OCASIO (dyspnea on exertion)  Ordering Physician: ELLE CLOUD  Referring Physician: ELLE CLOUD  Performed By: Leola Thrasher     BSA: 1.5 m2  Height: 60 in  Weight: 123 lb  HR: 60  _____________________________________________________________________________  __        Procedure  Complete Portable Echo Adult.  _____________________________________________________________________________  __        Interpretation Summary     Technically difficult study.  The  visual ejection fraction is estimated at 55-60%.  The right ventricle is normal in size and function.  Unable to assess mean RA pressure due to technically difficult study.  Right ventricular systolic pressure could not be approximated due to  inadequate tricuspid regurgitation.  Mild-moderate AI. No AS.     No prior studies for comparison.  _____________________________________________________________________________  __        Left Ventricle  The left ventricle is normal in size. There is borderline concentric left  ventricular hypertrophy. The visual ejection fraction is estimated at 55-60%.  Diastolic Doppler findings (E/E' ratio and/or other parameters) suggest left  ventricular filling pressures are indeterminate.     Right Ventricle  The right ventricle is normal in size and function.     Atria  Normal left atrial size. Right atrial size is normal.     Mitral Valve  There is mild mitral annular calcification. There is trace mitral  regurgitation. There is no mitral valve stenosis.        Tricuspid Valve  Right ventricular systolic pressure could not be approximated due to  inadequate tricuspid regurgitation. There is trace to mild tricuspid  regurgitation.     Aortic Valve  The aortic valve is trileaflet with aortic valve sclerosis. There is mild to  moderate (1-2+) aortic regurgitation. No hemodynamically significant valvular  aortic stenosis.     Pulmonic Valve  The pulmonic valve is not well visualized.     Vessels  The aortic root is normal size. Normal size ascending aorta. Unable to assess  mean RA pressure due to technically difficult study.     Pericardium  There is no pericardial effusion.     _____________________________________________________________________________  __  MMode/2D Measurements & Calculations  IVSd: 1.1 cm  LVIDd: 4.1 cm  LVIDs: 2.5 cm  LVPWd: 0.92 cm  FS: 39.9 %     LV mass(C)d: 129.5 grams  LV mass(C)dI: 85.3 grams/m2  Ao root diam: 3.3 cm  LA dimension: 3.2 cm  asc Aorta Diam:  3.3 cm  LA/Ao: 0.98  LA Volume (BP): 43.0 ml  LA Volume Index (BP): 28.3 ml/m2  RWT: 0.45        Doppler Measurements & Calculations  MV E max gabby: 47.6 cm/sec  MV A max gabby: 73.2 cm/sec  MV E/A: 0.65     MV dec time: 0.23 sec  AI P1/2t: 566.2 msec  PA acc time: 0.10 sec  E/E' av.0  Lateral E/e': 4.9  Medial E/e': 13.0           _____________________________________________________________________________  __           Report approved by: Jericho Victor 2020 02:09 PM           Specimen Collected: 20 13:01 Last Resulted: 20 13:01      Order Details     View Encounter     Lab and Collection Details     Routing     Result History    View Encounter Conversation        Linked Documents      View Image    Result Care Coordination      Result Notes     Taylor Boateng MD   2020 10:40 PM CST Back to Top    Please notify patient by sending following letter           Stress Test:  Date: Results:    ECG Reviewed:  Date: Results:    Cath:  Date: Results:      METS/Exercise Tolerance:     Hematologic: Comments: Pancytopenia  Severe myelodysplastic syndrome that requires transfusions of PRBC and platelets.  She has severe thrombocytopenia currently.  Heme/Onc deferred to the surgeon on her platelet level.  The surgeon is aware of the <50k level and wishes to proceed with surgery given the low bleeding risk surgery    (+) History of blood clots,    pt is not anticoagulated, anemia,          Musculoskeletal:       GI/Hepatic:     (+) GERD,                   Renal/Genitourinary:     (+) renal disease, type: CRI,            Endo:     (+)          thyroid problem, hypothyroidism,           Psychiatric/Substance Use:       Infectious Disease:       Malignancy:       Other:            Physical Exam    Airway        Mallampati: II   TM distance: > 3 FB   Neck ROM: full   Mouth opening: > 3 cm    Respiratory Devices and Support         Dental       (+) Modest Abnormalities - crowns, retainers, 1 or 2  missing teeth      Cardiovascular   cardiovascular exam normal          Pulmonary   pulmonary exam normal                OUTSIDE LABS:  CBC:   Lab Results   Component Value Date    WBC 1.6 (L) 08/10/2023    WBC 1.4 (L) 08/09/2023    HGB 8.6 (L) 08/10/2023    HGB 8.6 (L) 08/09/2023    HCT 26.3 (L) 08/10/2023    HCT 26.0 (L) 08/09/2023    PLT 24 (LL) 08/10/2023    PLT 42 (LL) 08/09/2023     BMP:   Lab Results   Component Value Date     08/10/2023     08/09/2023    POTASSIUM 4.2 08/10/2023    POTASSIUM 4.2 08/09/2023    CHLORIDE 107 08/10/2023    CHLORIDE 109 (H) 08/09/2023    CO2 19 (L) 08/10/2023    CO2 22 08/09/2023    BUN 39.2 (H) 08/10/2023    BUN 40.1 (H) 08/09/2023    CR 1.29 (H) 08/10/2023    CR 1.64 (H) 08/09/2023    GLC 90 08/10/2023     (H) 08/10/2023     COAGS:   Lab Results   Component Value Date    PTT 32 09/10/2020    INR 1.73 (H) 04/14/2022     POC: No results found for: BGM, HCG, HCGS  HEPATIC:   Lab Results   Component Value Date    ALBUMIN 3.4 (L) 08/07/2023    PROTTOTAL 8.3 08/07/2023    ALT 5 08/07/2023    AST 8 08/07/2023    ALKPHOS 110 (H) 08/07/2023    BILITOTAL 0.8 08/07/2023     OTHER:   Lab Results   Component Value Date    PH 7.36 09/11/2012    LACT 0.9 08/07/2023    LEO 9.0 08/10/2023    PHOS 4.5 08/08/2023    MAG 1.7 08/10/2023    AMYLASE 33 03/26/2004    TSH 1.97 07/26/2022    SED 73 (H) 12/07/2021       Anesthesia Plan    ASA Status:  4    NPO Status:  NPO Appropriate    Anesthesia Type: General.     - Airway: LMA   Induction: Intravenous.   Maintenance: Balanced.        Consents    Anesthesia Plan(s) and associated risks, benefits, and realistic alternatives discussed. Questions answered and patient/representative(s) expressed understanding.     - Discussed:     - Discussed with:  Patient            Postoperative Care    Pain management: IV analgesics, Multi-modal analgesia.   PONV prophylaxis: Ondansetron (or other 5HT-3)     Comments:    Other Comments: H&P  reviewed    Discussed perioperative DNR/DNI.  I explained that intubation and a potential requirement with the GA, and the patient accepts this for the procedure.  However she would continue to not receive any CPR and advanced resuscitation if a serious cardiac event were to occur.            Jett Callahan MD

## 2023-08-10 NOTE — OR NURSING
PACU sign-out received from Dr. Callahan.       Patient requested this writer call her son Shakeel to update she is done with cystoscopy. Son was called, he would like an update on results from Physician as he did not know she was having a procedure today.

## 2023-08-10 NOTE — PROVIDER NOTIFICATION
MD Notification    Notified Person: MD    Notified Person Name: Dr. Marianna Car    Notification Date/Time: 08/10/23 2:15 PM     Notification Interaction: [employer]    Purpose of Notification: Pt would like if you would contact her son, Shakeel, and give him an update on her. His number is in her chart. Thanks.     Orders Received: Done, called and updated    Comments:

## 2023-08-10 NOTE — PLAN OF CARE
MD Notification    Notified Person: MD    Notified Person Name:     Notification Date/Time: 0628 8/10/2023    Notification Interaction: on-call answering service- Dr. Nettles    Purpose of Notification: platelet threshold for surgery this am    Orders Received: Verbal order for 1 unit platelets if platelets between 30-50 to be given before surgery.     Comments:

## 2023-08-10 NOTE — PLAN OF CARE
Goal Outcome Evaluation: 1930-0700    A&O x4. VSS on RA. SBA w/ cane, steady gait. Voiding spontaneously, straining urine.  NPO since 0000. Tylenol x1 for HA, effective, otherwise no pain. PIV SL w/ intermittent antibiotic given x1. Labs were drawn and in process.    Plan is for cysto, possible URS/HLL, stent placement this am w/ Dr. Lujan.

## 2023-08-10 NOTE — PROGRESS NOTES
UROLOGY BRIEF OP NOTE  PREOP DX- RT FLANK PAIN, 5MM RT DISTAL STONE, RT HYDRO, HX ELEVATED CREAT.  POSTOP DX- EDEMATOUS RT ORIFICE, STONE PASSED  PROCEDURE CYSTO, RT RETROGRADE, RT URETEROSCOPY AND NO STONES SEEN  ANES- GEN  SURGEON - JAIMEE  FINDINGS- EDEMATOUS RT DISTAL URETER. RETROGRADE NEG, URS NEG.     SIGN OFF

## 2023-08-10 NOTE — PLAN OF CARE
[id number] 8/10/23  A/Ox4, VSS ex HTN. Cystoscopy procedure this AM-no stone found, so no stent was placed. Platelets transfused this shift. Pt is SBA with cane and continent of B/B. Good urine output-with some blood (to be expected from procedure). Pt ambulated on unit x2. On reg diet with good appetite. MD updated pt's son Shakeel. Discharge tomorrow with home care pending labs.

## 2023-08-10 NOTE — CONSULTS
Care Management Initial Consult    General Information  Assessment completed with: Bonny Sterling  Type of CM/SW Visit: Initial Assessment    Primary Care Provider verified and updated as needed: Yes   Readmission within the last 30 days: no previous admission in last 30 days      Reason for Consult: discharge planning  Advance Care Planning:            Communication Assessment  Patient's communication style: spoken language (English or Bilingual)    Hearing Difficulty or Deaf: yes   Wear Glasses or Blind: yes    Cognitive  Cognitive/Neuro/Behavioral: WDL        Orientation: oriented x 4             Living Environment:   People in home: alone     Current living Arrangements: house      Able to return to prior arrangements: yes       Family/Social Support:  Care provided by: self  Provides care for: no one  Marital Status:   Children, Sibling(s)          Description of Support System: Supportive, Involved         Current Resources:   Patient receiving home care services: No     Community Resources: None  Equipment currently used at home: shower chair  Supplies currently used at home: None    Employment/Financial:  Employment Status: retired        Financial Concerns: No concerns identified   Referral to Financial Worker: No       Does the patient's insurance plan have a 3 day qualifying hospital stay waiver?  No    Lifestyle & Psychosocial Needs:  Social Determinants of Health     Tobacco Use: Medium Risk (8/10/2023)    Patient History     Smoking Tobacco Use: Former     Smokeless Tobacco Use: Never     Passive Exposure: Past   Alcohol Use: Not on file   Financial Resource Strain: Not on file   Food Insecurity: Not on file   Transportation Needs: Not on file   Physical Activity: Not on file   Stress: Not on file   Social Connections: Not on file   Intimate Partner Violence: Not At Risk (5/31/2023)    Humiliation, Afraid, Rape, and Kick questionnaire     Fear of Current or Ex-Partner: No     Emotionally Abused:  No     Physically Abused: No     Sexually Abused: No   Depression: Not at risk (8/2/2023)    PHQ-2     PHQ-2 Score: 0   Housing Stability: Not on file       Functional Status:  Prior to admission patient needed assistance:   Dependent ADLs:: Independent  Dependent IADLs:: Transportation       Mental Health Status:          Chemical Dependency Status:                Values/Beliefs:  Spiritual, Cultural Beliefs, [employer] Practices, Values that affect care: no               Additional Information:  Per consult for discharge planning, met with patient to discuss discharge plan.  Per pt, she lives alone in her home.  Patient reports being independent with her ADLs and IADLs, but has her groceries delivered and uses Uber to transport her to her medical appointments.  Discussed cooking and pt stated it is getting more difficult to get her meals prepared.  Discussed resource [employer] that provides free meals for cancer patients.  Patient shared that she received information regarding [employer] from the Dietician and may look into this program.  Discussed that PT recommends TCU and pt declined TCU but was open to having home care RN, PT/OT.  Discussed Oncology follow-up and patient stated she has follow-up scheduled next week.    Referral sent to [facility] and [facility] is able to accept for RN, PT/OT.      Inpatient Care Coordinator will continue to follow for discharge planning.   ANABELLE Hooks RN, BSN, OCN   Inpatient Care Coordination [address]  Office: [phone]

## 2023-08-10 NOTE — ANESTHESIA CARE TRANSFER NOTE
Patient: Bonny Raymundo    Procedure: Procedure(s):  CYSTOSCOPY, RIGHT RETROGRADE URETEROSCOPY,       Diagnosis: Ureteric stone [N20.1]  Diagnosis Additional Information: No value filed.    Anesthesia Type:   General     Note:    Oropharynx: oropharynx clear of all foreign objects  Level of Consciousness: awake  Oxygen Supplementation: face mask  Level of Supplemental Oxygen (L/min / FiO2): 10  Independent Airway: airway patency satisfactory and stable  Dentition: dentition unchanged  Vital Signs Stable: post-procedure vital signs reviewed and stable  Report to RN Given: handoff report given  Patient transferred to: PACU    Handoff Report: Identifed the Patient, Identified the Reponsible Provider, Reviewed the pertinent medical history, Discussed the surgical course, Reviewed Intra-OP anesthesia mangement and issues during anesthesia, Set expectations for post-procedure period and Allowed opportunity for questions and acknowledgement of understanding      Vitals:  Vitals Value Taken Time   BP     Temp     Pulse 76 08/10/23 0934   Resp 12 08/10/23 0934   SpO2 100 % 08/10/23 0934   Vitals shown include unvalidated device data.    Electronically Signed By: KHADRA Melgar CRNA  August 10, 2023  9:35 AM

## 2023-08-10 NOTE — PROGRESS NOTES
Hematology Sign-off Note:  - Interval events noted including ureteroscopy  - Hematology will sign off, patient to follow-up with Dr. Spann on 8/16  - She would benefit from count monitoring prior to then on discharge, message sent to Dr. Spann's team, pending response  - Monse Wayne PA-C

## 2023-08-10 NOTE — ANESTHESIA POSTPROCEDURE EVALUATION
Patient: Bonny Raymundo    Procedure: Procedure(s):  CYSTOSCOPY, RIGHT RETROGRADE URETEROSCOPY,       Anesthesia Type:  General    Note:     Postop Pain Control: Uneventful            Sign Out: Well controlled pain   PONV: No   Neuro/Psych: Uneventful            Sign Out: Acceptable/Baseline neuro status   Airway/Respiratory: Uneventful            Sign Out: Acceptable/Baseline resp. status   CV/Hemodynamics: Uneventful            Sign Out: Acceptable CV status; No obvious hypovolemia; No obvious fluid overload   Other NRE: NONE   DID A NON-ROUTINE EVENT OCCUR? No           Last vitals:  Vitals Value Taken Time   /77 08/10/23 1015   Temp 36.7  C (98  F) 08/10/23 1015   Pulse 64 08/10/23 1016   Resp 13 08/10/23 1016   SpO2 97 % 08/10/23 1017   Vitals shown include unvalidated device data.    Electronically Signed By: Jett Callahan MD  August 10, 2023  12:18 PM

## 2023-08-11 NOTE — PROGRESS NOTES
[facility]: Cancer Care                                                                                          Writer was able to speak with Bonny.  She stated she is still at [facility] but plans on going home today or tomorrow.  They will give her another unit of blood before she goes home.  Writer informed pt that she is on the schedule at the [facility] to get blood on Tuesday and she should come to the [facility] at 10am to do labs.  She was also told that Dr Spann would like to see her Wednedsay at 11:30am for an office visit to discuss treatment options.  Pt agrees with plan and will come to clinic on Tuesday.     Signature:  Sue Marquez RN

## 2023-08-11 NOTE — PROGRESS NOTES
Care Management Discharge Note    Discharge Date: 08/11/2023       Discharge Disposition: Home, Home Care    Discharge Services: None    Discharge DME: None    Discharge Transportation: family or friend will provide    Private pay costs discussed: Not applicable    Does the patient's insurance plan have a 3 day qualifying hospital stay waiver?  No    PAS Confirmation Code:    Patient/family educated on Medicare website which has current facility and service quality ratings:      Education Provided on the Discharge Plan:    Persons Notified of Discharge Plans: Bedside RN  Patient/Family in Agreement with the Plan: yes    Handoff Referral Completed: Yes    Additional Information:  Patient has discharge orders for home with home care RN, PT/OT services.  Met with patient to discuss discharge plan for home with [facility] RN, PT/OT and Oncology follow-up and labs on August 15th.  Patient stated she is getting a unit of blood prior to discharge.    ANABELLE Hooks RN, BSN, OCN   Inpatient Care Coordination [address]  Office: [phone]       Attending Attestation (For Attendings USE Only)...

## 2023-08-11 NOTE — PLAN OF CARE
Goal Outcome Evaluation:    Pt is AO x4, [place]. VSS on RA. C/o 3/10 headache pain at HS, PRN tylenol effective. Up SBA w/ cane to BR. Good UOP, tea-colored w/ some sediment. Tolerating regular diet. PIV SL. Mg protocol. Discharge home w/ home care possibly today.

## 2023-08-11 NOTE — DISCHARGE SUMMARY
[facility]  Hospitalist Discharge Summary      Date of Admission:  8/7/2023  Date of Discharge:  8/11/2023  Discharging Provider: Marianna Car MD  Discharge Service: Hospitalist Service    Discharge Diagnoses   Please see hospital course     Clinically Significant Risk Factors     # Severe Malnutrition: based on nutrition assessment      Follow-ups Needed After Discharge   Follow-up Appointments     Follow-up and recommended labs and tests       F/u with PCP in 1week.recheck CBC, BMP in 4days           Unresulted Labs Ordered in the Past 30 Days of this Admission       Date and Time Order Name Status Description    8/6/2023  8:39 AM PREPARE PHERESED PLATELETS (UNIT) Preliminary     8/6/2023  8:39 AM PREPARE PHERESED PLATELETS (UNIT) Preliminary     8/6/2023  8:39 AM PREPARE RED BLOOD CELLS (UNIT) Preliminary     8/6/2023  8:39 AM PREPARE RED BLOOD CELLS (UNIT) Preliminary     8/3/2023 12:32 PM PREPARE RED BLOOD CELLS (UNIT) Preliminary     8/3/2023 12:32 PM PREPARE RED BLOOD CELLS (UNIT) Preliminary     7/31/2023  8:27 AM PREPARE RED BLOOD CELLS (UNIT) Preliminary     7/29/2023  7:46 AM PREPARE RED BLOOD CELLS (UNIT) Preliminary     7/29/2023  7:46 AM PREPARE RED BLOOD CELLS (UNIT) Preliminary     7/26/2023  9:09 AM PREPARE PHERESED PLATELETS (UNIT) Preliminary     7/26/2023  9:09 AM PREPARE RED BLOOD CELLS (UNIT) Preliminary     7/25/2023  6:34 AM PREPARE RED BLOOD CELLS (UNIT) Preliminary     7/24/2023  5:31 PM PREPARE RED BLOOD CELLS (UNIT) Preliminary     7/24/2023  5:31 PM PREPARE RED BLOOD CELLS (UNIT) Preliminary     7/24/2023  3:10 PM PREPARE PHERESED PLATELETS (UNIT) Preliminary     7/23/2023  7:23 AM PREPARE RED BLOOD CELLS (UNIT) Preliminary     7/23/2023  7:23 AM PREPARE RED BLOOD CELLS (UNIT) Preliminary     7/20/2023  8:29 AM PREPARE RED BLOOD CELLS (UNIT) Preliminary     7/13/2023  7:43 AM PREPARE PHERESED PLATELETS (UNIT) Preliminary             Discharge Disposition    Discharged to home  Condition at discharge: Stable    Hospital Course   Bonny Raymundo is a 77 year old female admitted on 8/7/2023 with obstructing UVJ stone in setting of transfusion dependent myelodysplastic syndrome     Right Obstructing UVJ stone 5x5mm with moderate/marked right hydroureteronephrosis she is S/p Cystoscopy, right retrograde and right ureteroscopy, no stones remaining edematous distal ureter seen .   UA: neg nitrites, small leuk esterase, 14 WBCs. Procal 0.52. WBC 1.4 with   *CT AP: right UVJ obstructing 5x5mm stone with moderate/marked right hydroureteronephrosis  - urology consulted, underwent  procedure  on 8/10/23  Urine culture <10K urgenital baldemar   Stopped ceftriaxone   Urology signed off   Regular diet ordered     Myelodysplastic syndrome, transfusion dependent  Anemia, thrombocytopenia  Follows with [facility] oncology. Seen in clinic 8/7. Hgb after 1L IVF down to 6.2 and platelets 5  - Her scheduled Azacitadine dose was held 8/7 due to presentation above, she will continue to follow up outpatient  - Transfuse 1 unit PRBCs for hemoglobin 7.0-8 if symptomatic, 2 units for hemoglobin < 7.   - Transfuse 1 unit platelets for platelet count <10k or serious bleeding.  - Ordered for 2 units PRBCs on admit 8/7  Hgb at  8.7 on 8/8/23  - Plan to transfuse platelets 8/8 once timing for urologic procedure known  - blood consent completed with patient in ED, she has been type and screened  - continue PTA prophylactic acyclovir BID  1 unit platelets transfusions given on 8/10/2023  Hgb at 8.6-7.5 and Platelet count at 24K-36K    1 unit PRBCs given on 8/11/2023  Recheck CBC BMP in 4 days     ELSA on CKD Stage 3  Baseline Cr 1-1.2. Cr up to 2.45 in clinic, improved to 2.31 after IVF. Suspect will improve more with blood products and once stent placed  - BMP in AM  Cr improved to 1.64-1.29 on 8/10/23.   Stpped IVF      GERD  Continue PTA PPI     Hearing loss, severe  Noted     Hx PE/DVT  - not on AC due to  MDS as above.     Hx SDH/SAH and R hip fracture after fall Jan 2023  Noted'     Incidentally noted splenic lesion  Could be benign splenic hemangioma vs splenic cyst. Measures 1.7x1.5x 2cm  - Follow up with oncology if desired        Diet: regular diet   DVT Prophylaxis: Pneumatic Compression Devices  Mejia Catheter: Not present  Lines: None     Cardiac Monitoring: None  Code Status: No CPR- Do NOT Intubatednr/dni        Clinically Significant Risk Factors Present on Admission           # Hypocalcemia: Lowest Ca = 8.3 mg/dL in last 2 days, will monitor and replace as appropriate     # Hypoalbuminemia: Lowest albumin = 3.4 g/dL at 8/7/2023 12:05 PM, will monitor as appropriate      # Thrombocytopenia: Lowest platelets = 5 in last 2 days, will monitor for bleeding     # Acute Kidney Injury, unspecified: based on a >150% or 0.3 mg/dL increase in last creatinine compared to past 90 day average, will monitor renal function                                   Clinically Significant Risk Factors             # Hypomagnesemia: Lowest Mg = 1.4 mg/dL in last 2 days, will replace as needed   # Hypoalbuminemia: Lowest albumin = 3.4 g/dL at 8/7/2023 12:05 PM, will monitor as appropriate      # Thrombocytopenia: Lowest platelets = 24 in last 2 days, will monitor for bleeding             # Severe Malnutrition: based on nutrition assessment  , PRESENT ON ADMISSION         Pt refusing to go to tcu wants to go [facility] with [facility] services home PT OT RN care ordered on discharge  Her son was updated over the phone on 8/10/2023     Discussed with bedside RN and patient on 8/11/2023  Disposition Plan  Expected Discharge Date: 08/11/2023        Discharge Comments:         Marianna Car MD  Hospitalist Service  [facility]  Securely message with [employer] (more info)  Text page via [employer] Paging/Directory     Consultations This Hospital Stay   UROLOGY IP CONSULT  HEMATOLOGY & ONCOLOGY IP CONSULT  PHYSICAL THERAPY ADULT IP  CONSULT  CARE MANAGEMENT / SOCIAL WORK IP CONSULT    Code Status   No CPR- Do NOT Intubate    Time Spent on this Encounter   I, Marianna Car MD, personally saw the patient today and spent greater than 30 minutes discharging this patient.       Marianna Car MD  [place] [address] [facility]  [address]  [place] [address]  Phone: [phone]  ______________________________________________________________________    Physical Exam   Vital Signs: Temp: 97.8  F (36.6  C) Temp src: Axillary BP: 133/72 Pulse: 60   Resp: 16 SpO2: 96 % O2 Device: None (Room air)    Weight: 100 lbs 1.6 oz      General Appearance:  Alert, awake, NAD   Respiratory: CTA b/l   Cardiovascular: RRR  GI: soft, NT< ND< BS+  Skin: warm and dry   Other:  No edema               Primary Care Physician   Shelli Shailesh    Discharge Orders      Home Care Referral      Reason for your hospital stay    Right sided Ureterolithiasis and ELSA S/p cystoscopy stone passed. ELSA improved. Received blood and platelets transfusions for underlying MDS     Follow-up and recommended labs and tests     F/u with PCP in 1week.recheck CBC, BMP in 4days     Activity    Your activity upon discharge: activity as tolerated     Diet    Follow this diet upon discharge: Orders Placed This Encounter      Snacks/Supplements Adult: Ensure Enlive; Between Meals      Regular Diet Adult       Significant Results and Procedures   Most Recent 3 CBC's:  Recent Labs   Lab Test 08/11/23  0656 08/10/23  0638 08/09/23  0718   WBC 1.1* 1.6* 1.4*   HGB 7.5* 8.6* 8.6*   MCV 94 92 92   PLT 36* 24* 42*     Most Recent 3 BMP's:  Recent Labs   Lab Test 08/10/23  0640 08/10/23  0638 08/09/23  2211 08/09/23  0717 08/08/23  0720   NA  --  138  --  141 139   POTASSIUM  --  4.2  --  4.2 4.6   CHLORIDE  --  107  --  109* 107   CO2  --  19*  --  22 21*   BUN  --  39.2*  --  40.1* 46.2*   CR  --  1.29*  --  1.64* 2.24*   ANIONGAP  --  12  --  10 11   LEO  --  9.0  --  8.7* 8.8    GLC 90 104* 124* 102* 102*     Most Recent 2 LFT's:  Recent Labs   Lab Test 08/07/23  1205 08/04/23  1026   AST 8 7   ALT 5 <5   ALKPHOS 110* 105*   BILITOTAL 0.8 0.8     Most Recent 3 INR's:  Recent Labs   Lab Test 04/14/22  1453 03/03/22  1106 05/05/21  1432   INR 1.73* 1.81* 2.02*   ,   Results for orders placed or performed during the hospital encounter of 08/07/23   XR Surgery ZEFERINO L/T 5 Min Fluoro w Stills    Narrative    This exam was marked as non-reportable because it will not be read by a   radiologist or a [place] non-radiologist provider.           *Note: Due to a large number of results and/or encounters for the requested time period, some results have not been displayed. A complete set of results can be found in Results Review.       Discharge Medications   Current Discharge Medication List        CONTINUE these medications which have NOT CHANGED    Details   acetaminophen (TYLENOL) 650 MG CR tablet Take 650 mg by mouth every 8 hours as needed for mild pain or fever      acyclovir (ZOVIRAX) 400 MG tablet Take 1 tablet (400 mg) by mouth 2 times daily  Qty: 60 tablet, Refills: 3    Associated Diagnoses: Neutropenia, unspecified type (H)      CALCIUM PO Take 1 tablet by mouth daily      diphenhydrAMINE (BENADRYL) 25 MG tablet Take 25 mg by mouth nightly as needed for sleep  Qty: 56 tablet      multivitamin w/minerals (THERA-VIT-M) tablet Take 1 tablet by mouth daily      ondansetron (ZOFRAN) 8 MG tablet Take 1 tab (8 mg) by mouth prior to azacitidine, then 1 tab every 8 hours as needed for nausea.  Qty: 30 tablet, Refills: 5    Associated Diagnoses: RAEB-2 (refractory anemia with excess blasts-2) (H)      pantoprazole (PROTONIX) 20 MG EC tablet TAKE 1 TABLET BY MOUTH EVERY DAY  Qty: 90 tablet, Refills: 11    Associated Diagnoses: Gastroesophageal reflux disease without esophagitis      UNABLE TO FIND Take by mouth daily MEDICATION NAME: Vitamin K           STOP taking these medications        levofloxacin (LEVAQUIN) 250 MG tablet Comments:   Reason for Stopping:         levofloxacin (LEVAQUIN) 250 MG tablet Comments:   Reason for Stopping:         levofloxacin (LEVAQUIN) 500 MG tablet Comments:   Reason for Stopping:             Allergies   Allergies   Allergen Reactions    Cats     Dogs     Seasonal Allergies

## 2023-08-11 NOTE — PLAN OF CARE
Physical Therapy Discharge Summary    Reason for therapy discharge:    Discharged to home with home therapy.    Progress towards therapy goal(s). See goals on Care Plan in [facility] electronic health record for goal details.  Goals partially met.  Barriers to achieving goals:   discharge from facility.    Therapy recommendation(s):    Continued therapy is recommended.  Rationale/Recommendations:  PT recommendations of TCU for improvement of strength, activity tolerance, and independence with functional mobility prior to returning home. Patient declining recommendations and returning home with  PT. Recommend continued skilled therapies for home safety evaluation and improvement in balance. Recommend 24/7 supervision for ADL's and stair management.

## 2023-08-12 NOTE — PLAN OF CARE
A&Ox4. Pt mood labile. Denies pain. Given 1 unit RBC. VSS on RA. PIV removed. Discharged home with HC. Son picked up pt. AVS reviewed with pt and & all questions answered.

## 2023-08-15 NOTE — NURSING NOTE
Chief Complaint   Patient presents with    Blood Draw     Labs drawn via piv placed by RN in lab. VS taken.      Labs drawn from PIV placed by RN. Line flushed with saline. Vitals taken. Pt checked in for appointment(s).    Roxi Melgar RN

## 2023-08-15 NOTE — PROGRESS NOTES
Infusion Nursing Note:  Bonny Raymundo presents today for Platelet transfusion.    Patient seen by provider today: No   present during visit today: Not Applicable.    Note:     Pt was recently discharged from [facility] for possible kidney stones. Pt denies any fevers/chills, cough, bleeding, SOB or falls. Creatine has improved. Denies urinary concerns.     Reviewed labs with patient and need for platelet transfusion. Besides visit with Dr. Spann tomorrow, pt does not have any labs/transfusion appointments. Writer confirmed vis secure chat with Sue NATARAJAN that this will be addressed during her visit with Dr. Spann tomorrow. Pt verbalized understanding.     Intravenous Access:  Peripheral IV placed.    Treatment Conditions:  Lab Results   Component Value Date    HGB 9.5 (L) 08/15/2023    WBC 2.1 (L) 08/15/2023    ANEU 0.6 (L) 08/15/2023    ANEUTAUTO 1.3 (L) 09/12/2022    PLT 4 (LL) 08/15/2023        Lab Results   Component Value Date     08/15/2023    POTASSIUM 4.0 08/15/2023    MAG 2.0 08/11/2023    CR 1.10 (H) 08/15/2023    ELO 9.3 08/15/2023    BILITOTAL 0.7 08/15/2023    ALBUMIN 3.2 (L) 08/15/2023    ALT 5 08/15/2023    AST 12 08/15/2023     Consent signed. 1/11/2023    Post Infusion Assessment:  Patient tolerated infusion without incident.  Blood return noted pre and post infusion.  No evidence of extravasations.  Access discontinued per protocol.       Discharge Plan:   Discharge instructions reviewed with: Patient.  Patient and/or family verbalized understanding of discharge instructions and all questions answered.  AVS to patient via AddressReport.  Patient will return tomorrow for next appointment with Dr. Spann.   Departure Mode: Ambulatory.      Eboni Atkinson RN

## 2023-08-16 NOTE — PROGRESS NOTES
Hematology clinic visit  In person visit     Problem list:  - Myelodysplastic syndrome with excess blasts-2 (MDS-EB 2).  Bone marrow biopsy 12/15/2022.   IPSS-R score 8.5-very high - based on cytogenetics -7, > 10% blasts, hemoglobin less than 8, platelet , ANC less than 0.8     Final Diagnosis   A.  Bone Marrow Biopsy from left posterior iliac crest and peripheral smear morphology:  - Myelodysplastic syndrome with excess blasts 2 (MDS-EB2) showing:       - Increased marrow blasts (13.3% on imprint smear differential, 12% on flow cytometry).       - Mildly hypercellular marrow for age , 30-35% .      - Moderately increased marrow fibrosis (MF-2).      - No appreciable dysplasia.      - Increased marrow storage iron with 84% sideroblasts, 6% ringed sideroblasts      - Peripheral blood pancytopenia showing:            - Moderate macrocytic, normochromic anemia without evidence of hemolysis or increased red cell regeneration, rare circulating nucleated red blood cell.           - Moderate leukopenia with rare circulating blasts without Bernice rods.           - Moderate to marked thrombocytopenia.   Electronically signed by Rick Bangura MD on 12/19/2022    Cytogenetics-46,XX,-7,+21[20]  Detected Alterations of Known or Potential Pathogenicity: RUNX1 G199E   Detected Alterations of Uncertain Significance: None   Genes with No Detected Clinically Significant Alterations: ABL1, ALK, ASXL1, ATRX, BCOR, CALR, CBL, CEBPA, CSF3R, DNMT3A, ETV6, EZH2, FLT3, GATA1, GATA2, HRAS, IDH1, IDH2, JAK1, JAK2, JAK3, KIT, KMT2A, KRAS, MPL, NF1, NPM1, NRAS, PDGFRA, PDGFRB, PHF6, PTPN11, JAMA, SETBP1, SF1, SF3A1, SF3B1, SH2B3, SRSF2, TET2, TP53, U2AF2, WT1, ZRSR2     She was initially diagnosed as aplastic anemia-diagnosed 9/26/2016, repeat bone marrow biopsy 12/22/16- bone marrow <5% cellular with no dysplasia.  Normal cytogenetics. PNH negative. 1/9/ 2017-treated with ATG, methylprednisolone, cyclosporine, and eltrombopag.   Cyclosporine discontinued May 2021, eltrombopag discontinued June 2021 due to abnormal liver function test.    Partial remission with the immunotherapy, which is gradually weaned.  Now transfusion dependent  - Chronic kidney disease stage IV  - Atrial fibrillation-on apixaban chronically  -History of iron deficiency anemia- Venofer 300 mg IV 7/7/2021, 8/6/2021, 4/14/2022  -History of B12 deficiency  -MGUS  - COPD  - GERD  -History of lower GI bleed 2017  -Severely decreased bilateral hearing  - Benign paroxysmal positional vertigo  - History of unprovoked left leg deep vein thrombosis (DVT)  3/17/21 and bilateral pulmonary embolism (PE) 9/11/12  - Osteoporosis with compression fracture of spine.  - Shingles right face  2022  - Fall with right hip fracture 1/16/2023  - Subdural hematoma related to fall from standing 1/16/2023-no surgical intervention needed      Interval history:  is here for follow-up of MDS-EB2.  I saw her on 6/7/2023.  She is by herself today.  Her son Shakeel could not join, but I discussed issues with him later in the day.  Interview is somewhat difficult, because she did not wear her hearing aids. Her IPSS R score is 8.5, considered very high risk.  After obtaining a second opinion, she decided to go forward with azacitidine.  She received azacitidine 75 mg per metered squared subcu daily 5/22 - 5/26/23, and then dose decreased to 50 mg/m2 daily x 5, 6/19-6/23. She has not received azacitidine since then due to low counts and hospitalization for kidney stone. .  She has needed regular red cell and platelet transfusions     She was admitted at [facility] 8/7-8/11/23 flank pain, feeling bad. Found to have kidney stone on CT scan. .She underwent cystoscopy, R retrograte and right ureteroscopy. She was hydrated, given RBC and platelet transfusions.  She reports no epistaxis, melena, hematochezia, hematuria or significant bruising.  No mouth sores.  Breathing is okay.  She says today that  she still has some right-sided pain.  No diarrhea or constipation.  She says her appetite is good.  She says she cooks 3 meals a day.  She sleeps well, but she takes 1 or 2 naps a day.  She says she is doing her own laundry, with machines in the basement.  However she does not have much laundry so going into the basement is a rare occurrence.  She is receiving rides from others including [employer] to get to clinic appointments.      PHYSICAL EXAMINATION:  /68   Pulse 86   Wt 45.4 kg (100 lb)   SpO2 97%   BMI 19.53 kg/m      General appearance:  Patient is 77 year old woman in no acute distress.  Frail appearing, temporal wasting.  Able to walk slowly with minimal use of a cane.  HEENT:  No pallor, icterus, or mucositis.  Decreased hearing.  Lungs:  Clear to auscultation bilaterally.   Heart:  Regular rate and rhythm; no S3 S4 or murmer.     Abdomen:  Positive bowel sounds, soft and nontender, nondistended.  No hepatomegaly. No splenomegaly appreciated.    Extremities: Bruising on hands and forearms from IV sites.  No joint swelling or tenderness.  Trace bilateral ankle edema.     Skin: Flaking skin at ankles.  No rash, no petechiae.    Labs:     Latest Reference Range & Units 08/15/23 10:41   WBC 4.0 - 11.0 10e3/uL 2.1 (L)   Hemoglobin 11.7 - 15.7 g/dL 9.5 (L)   Hematocrit 35.0 - 47.0 % 29.6 (L)   Platelet Count 150 - 450 10e3/uL 4 (LL)   RBC Count 3.80 - 5.20 10e6/uL 3.16 (L)   MCV 78 - 100 fL 94   MCH 26.5 - 33.0 pg 30.1   MCHC 31.5 - 36.5 g/dL 32.1   RDW 10.0 - 15.0 % 14.6   % Neutrophils % 28   % Lymphocytes % 35   % Monocytes % 30   % Eosinophils % 0   % Basophils % 1   % Blasts % 6   Absolute Basophils 0.0 - 0.2 10e3/uL 0.0   Absolute Neutrophil 1.6 - 8.3 10e3/uL 0.6 (L)   Absolute Lymphocytes 0.8 - 5.3 10e3/uL 0.7 (L)   Absolute Monocytes 0.0 - 1.3 10e3/uL 0.6   Absolute Eosinophils 0.0 - 0.7 10e3/uL 0.0   Absolute Blasts <=0.0 10e3/uL 0.1 (H)   (LL): Data is critically low  (L): Data is abnormally  low  (H): Data is abnormally high    Assessment and recommendation:     # MDS-EB 2- IPSS-R score very high risk-this means her median overall survival is 0.8 years and it 25% chance of developing acute leukemia within 0.7 years.  I discussed with her that so far the azacitidine has not helped her blood counts much.  Because the azacitidine does not work quickly, at least 3 cycles need to be given before we can say 1 where the other if it is helping.  The goal is to improve the blood counts so that she would not require transfusions for a while.  The azacitidine is not curative.  If it seems to be helping by day 28 of cycle 3, then I would recommend continuing to give it as long it is not helping.  If blood counts are not improved by day 28 of cycle 3, then I would recommend stopping azacitidine and continuing only with palliative transfusions.  She did not think receiving azacitidine 5 days in a row would be burdensome as long as she could get it at [facility] closer to home.   - Azacitidine cycle 3-start on  8/28/2023.     #Anemia, thrombocytopenia-this is due to mainly to the MDS.  She has not responded to Aranesp.   For transfusions, either the [facility] or [facility] is okay, she prefers not to have to come the day before for labs to get the type and screen.     - CBCDP, type and screen weekly  -Transfuse 1 unit PRBCs for hemoglobin 7.0-8 if symptomatic, 2 units for hemoglobin < 7.    -transfuse 1 unit platelets for platelet count <10k or serious bleeding.     #Frailty- her weight has remained about the same over the past few months.  I am concerned that she is downplaying her difficulties and saying she is doing more than she really is.  - PT/OT and home nurse will reportedly be visiting her house tomorrow.   -Palliative care consult       #Chronic kidney disease-  she had a temporary increase in her creatinine, possibly related to kidney stone, dehydration.  Now back to baseline.     #Subdural hematoma and  subarachnoid hemorrhage after a fall- she had a DVT a couple of years ago. Risk of bleeding while on anticoagulation outweighs risk of thrombosis in view of significant chronic thrombocytopenia.  -No apixaban or other anticoagulant       #CODE STATUS-   a DO NOT RESUSCITATE order was placed in chart 1/11/2023.        Time: I spent a total of 60 minutes on the day of the visit. Please see the note for further information on patient assessment and treatment.         Minerva Spann MD  Hematology

## 2023-08-16 NOTE — LETTER
8/16/2023         RE: Bonny Raymundo  [address]  [facility] [address]        Dear Colleague,    Thank you for referring your patient, Bonny Raymundo, to the [facility]. Please see a copy of my visit note below.    Hematology clinic visit  In person visit     Problem list:  - Myelodysplastic syndrome with excess blasts-2 (MDS-EB 2).  Bone marrow biopsy 12/15/2022.   IPSS-R score 8.5-very high - based on cytogenetics -7, > 10% blasts, hemoglobin less than 8, platelet , ANC less than 0.8     Final Diagnosis   A.  Bone Marrow Biopsy from left posterior iliac crest and peripheral smear morphology:  - Myelodysplastic syndrome with excess blasts 2 (MDS-EB2) showing:       - Increased marrow blasts (13.3% on imprint smear differential, 12% on flow cytometry).       - Mildly hypercellular marrow for age , 30-35% .      - Moderately increased marrow fibrosis (MF-2).      - No appreciable dysplasia.      - Increased marrow storage iron with 84% sideroblasts, 6% ringed sideroblasts      - Peripheral blood pancytopenia showing:            - Moderate macrocytic, normochromic anemia without evidence of hemolysis or increased red cell regeneration, rare circulating nucleated red blood cell.           - Moderate leukopenia with rare circulating blasts without Bernice rods.           - Moderate to marked thrombocytopenia.   Electronically signed by Rick Bangura MD on 12/19/2022    Cytogenetics-46,XX,-7,+21[20]  Detected Alterations of Known or Potential Pathogenicity: RUNX1 G199E   Detected Alterations of Uncertain Significance: None   Genes with No Detected Clinically Significant Alterations: ABL1, ALK, ASXL1, ATRX, BCOR, CALR, CBL, CEBPA, CSF3R, DNMT3A, ETV6, EZH2, FLT3, GATA1, GATA2, HRAS, IDH1, IDH2, JAK1, JAK2, JAK3, KIT, KMT2A, KRAS, MPL, NF1, NPM1, NRAS, PDGFRA, PDGFRB, PHF6, PTPN11, JAMA, SETBP1, SF1, SF3A1, SF3B1, SH2B3, SRSF2, TET2, TP53, U2AF2, WT1, ZRSR2     She  was initially diagnosed as aplastic anemia-diagnosed 9/26/2016, repeat bone marrow biopsy 12/22/16- bone marrow <5% cellular with no dysplasia.  Normal cytogenetics. PNH negative. 1/9/ 2017-treated with ATG, methylprednisolone, cyclosporine, and eltrombopag.  Cyclosporine discontinued May 2021, eltrombopag discontinued June 2021 due to abnormal liver function test.    Partial remission with the immunotherapy, which is gradually weaned.  Now transfusion dependent  - Chronic kidney disease stage IV  - Atrial fibrillation-on apixaban chronically  -History of iron deficiency anemia- Venofer 300 mg IV 7/7/2021, 8/6/2021, 4/14/2022  -History of B12 deficiency  -MGUS  - COPD  - GERD  -History of lower GI bleed 2017  -Severely decreased bilateral hearing  - Benign paroxysmal positional vertigo  - History of unprovoked left leg deep vein thrombosis (DVT)  3/17/21 and bilateral pulmonary embolism (PE) 9/11/12  - Osteoporosis with compression fracture of spine.  - Shingles right face  2022  - Fall with right hip fracture 1/16/2023  - Subdural hematoma related to fall from standing 1/16/2023-no surgical intervention needed      Interval history:  is here for follow-up of MDS-EB2.  I saw her on 6/7/2023.  She is by herself today.  Her son Shakeel could not join, but I discussed issues with him later in the day.  Interview is somewhat difficult, because she did not wear her hearing aids. Her IPSS R score is 8.5, considered very high risk.  After obtaining a second opinion, she decided to go forward with azacitidine.  She received azacitidine 75 mg per metered squared subcu daily 5/22 - 5/26/23, and then dose decreased to 50 mg/m2 daily x 5, 6/19-6/23. She has not received azacitidine since then due to low counts and hospitalization for kidney stone. .  She has needed regular red cell and platelet transfusions     She was admitted at [facility] 8/7-8/11/23 flank pain, feeling bad. Found to have kidney stone on CT scan.  .She underwent cystoscopy, R retrograte and right ureteroscopy. She was hydrated, given RBC and platelet transfusions.  She reports no epistaxis, melena, hematochezia, hematuria or significant bruising.  No mouth sores.  Breathing is okay.  She says today that she still has some right-sided pain.  No diarrhea or constipation.  She says her appetite is good.  She says she cooks 3 meals a day.  She sleeps well, but she takes 1 or 2 naps a day.  She says she is doing her own laundry, with machines in the basement.  However she does not have much laundry so going into the basement is a rare occurrence.  She is receiving rides from others including [employer] to get to clinic appointments.      PHYSICAL EXAMINATION:  /68   Pulse 86   Wt 45.4 kg (100 lb)   SpO2 97%   BMI 19.53 kg/m      General appearance:  Patient is 77 year old woman in no acute distress.  Frail appearing, temporal wasting.  Able to walk slowly with minimal use of a cane.  HEENT:  No pallor, icterus, or mucositis.  Decreased hearing.  Lungs:  Clear to auscultation bilaterally.   Heart:  Regular rate and rhythm; no S3 S4 or murmer.     Abdomen:  Positive bowel sounds, soft and nontender, nondistended.  No hepatomegaly. No splenomegaly appreciated.    Extremities: Bruising on hands and forearms from IV sites.  No joint swelling or tenderness.  Trace bilateral ankle edema.     Skin: Flaking skin at ankles.  No rash, no petechiae.    Labs:     Latest Reference Range & Units 08/15/23 10:41   WBC 4.0 - 11.0 10e3/uL 2.1 (L)   Hemoglobin 11.7 - 15.7 g/dL 9.5 (L)   Hematocrit 35.0 - 47.0 % 29.6 (L)   Platelet Count 150 - 450 10e3/uL 4 (LL)   RBC Count 3.80 - 5.20 10e6/uL 3.16 (L)   MCV 78 - 100 fL 94   MCH 26.5 - 33.0 pg 30.1   MCHC 31.5 - 36.5 g/dL 32.1   RDW 10.0 - 15.0 % 14.6   % Neutrophils % 28   % Lymphocytes % 35   % Monocytes % 30   % Eosinophils % 0   % Basophils % 1   % Blasts % 6   Absolute Basophils 0.0 - 0.2 10e3/uL 0.0   Absolute Neutrophil 1.6  - 8.3 10e3/uL 0.6 (L)   Absolute Lymphocytes 0.8 - 5.3 10e3/uL 0.7 (L)   Absolute Monocytes 0.0 - 1.3 10e3/uL 0.6   Absolute Eosinophils 0.0 - 0.7 10e3/uL 0.0   Absolute Blasts <=0.0 10e3/uL 0.1 (H)   (LL): Data is critically low  (L): Data is abnormally low  (H): Data is abnormally high    Assessment and recommendation:     # MDS-EB 2- IPSS-R score very high risk-this means her median overall survival is 0.8 years and it 25% chance of developing acute leukemia within 0.7 years.  I discussed with her that so far the azacitidine has not helped her blood counts much.  Because the azacitidine does not work quickly, at least 3 cycles need to be given before we can say 1 where the other if it is helping.  The goal is to improve the blood counts so that she would not require transfusions for a while.  The azacitidine is not curative.  If it seems to be helping by day 28 of cycle 3, then I would recommend continuing to give it as long it is not helping.  If blood counts are not improved by day 28 of cycle 3, then I would recommend stopping azacitidine and continuing only with palliative transfusions.  She did not think receiving azacitidine 5 days in a row would be burdensome as long as she could get it at [facility] closer to home.   - Azacitidine cycle 3-start on  8/28/2023.     #Anemia, thrombocytopenia-this is due to mainly to the MDS.  She has not responded to Aranesp.   For transfusions, either the [facility] or [facility] is okay, she prefers not to have to come the day before for labs to get the type and screen.     - CBCDP, type and screen weekly  -Transfuse 1 unit PRBCs for hemoglobin 7.0-8 if symptomatic, 2 units for hemoglobin < 7.    -transfuse 1 unit platelets for platelet count <10k or serious bleeding.     #Frailty- her weight has remained about the same over the past few months.  I am concerned that she is downplaying her difficulties and saying she is doing more than she really is.  - PT/OT and home nurse  will reportedly be visiting her house tomorrow.   -Palliative care consult       #Chronic kidney disease-  she had a temporary increase in her creatinine, possibly related to kidney stone, dehydration.  Now back to baseline.     #Subdural hematoma and subarachnoid hemorrhage after a fall- she had a DVT a couple of years ago. Risk of bleeding while on anticoagulation outweighs risk of thrombosis in view of significant chronic thrombocytopenia.  -No apixaban or other anticoagulant       #CODE STATUS-   a DO NOT RESUSCITATE order was placed in chart 1/11/2023.        Time: I spent a total of 60 minutes on the day of the visit. Please see the note for further information on patient assessment and treatment.         Minerva Spann MD  Hematology

## 2023-08-21 NOTE — PROGRESS NOTES
Received call from lab for critical plt count of 8. Pt has MDS-EB2. Pt has plans of plt transfusion tomorrow.     Mindy Reynolds MD  Hematology and Medical Oncology Fellow  [facility]  Pager: [phone]

## 2023-08-21 NOTE — PROGRESS NOTES
[facility]: Cancer Care                                                                                          Spoke with pt- she is on the infusion wait list for blood.  Advised it would be helpful for her to get her labs drawn today for a potential transfusion tomorrow.  Pt agreed with plan.  Writer called Teresa and got her on the schedule to get her labs drawn today at 3pm and then infusion can transfuse her tomorrow at the [facility] at 8 am. Pt aware and verbally repeated appts back to writer.  Infusion notified.         Signature:  Sue Marquez RN

## 2023-08-22 NOTE — PROGRESS NOTES
Infusion Nursing Note:  Bonny Raymundo presents today for 2 unit PRBC/1 unit Platelets.    Patient seen by provider today: No   present during visit today: Not Applicable.    Note: Patient reports fatigue with no dizziness, heart palpitations and light headedness. Denies fever/chills. Denies any s/sx of active bleeding. No new concern.     Patient is on wait list for next week. Patient will monitor it at [facility].    Intravenous Access:  Peripheral IV placed by Vascular Access.    Treatment Conditions:  Lab Results   Component Value Date    HGB 7.0 (L) 08/21/2023    WBC 2.6 (L) 08/21/2023    ANEU 0.6 (L) 08/21/2023    ANEUTAUTO 1.3 (L) 09/12/2022    PLT 8 (LL) 08/21/2023        Lab Results   Component Value Date     08/21/2023    POTASSIUM 4.5 08/21/2023    MAG 2.0 08/11/2023    CR 1.12 (H) 08/21/2023    LEO 9.4 08/21/2023    BILITOTAL 0.4 08/21/2023    ALBUMIN 3.1 (L) 08/21/2023    ALT 10 08/21/2023    AST 10 08/21/2023       Results reviewed, labs MET treatment parameters, ok to proceed with treatment.  Blood transfusion consent signed 1/11/23.      Post Infusion Assessment:  Patient tolerated infusion without incident.  Blood return noted pre and post infusion.  Site patent and intact, free from redness, edema or discomfort.  No evidence of extravasations.  Access discontinued per protocol.       Discharge Plan:   Patient declined prescription refills.  Discharge instructions reviewed with: Patient.  Patient and/or family verbalized understanding of discharge instructions and all questions answered.  [facility]  Patient will return next week for next appointment. See noted above.  Patient discharged in stable condition accompanied by: self.  Departure Mode: Ambulatory.      CHIKA BLUM RN

## 2023-08-23 NOTE — TELEPHONE ENCOUNTER
Medical Week 2 Survey      Flowsheet Row Responses   [facility] patient discharged from? [place]   Does the patient have one of the following disease processes/diagnoses(primary or secondary)? Other   Week 2 attempt successful? No   Unsuccessful attempts Attempt 1            Nya Godoy Registered Nurse   Notified pt. She says her ear doctor is well aware of her counts.

## 2023-08-23 NOTE — TELEPHONE ENCOUNTER
Pattie PT from [facility].     PT for 1 time a week for 3 weeks   1 time every other week for 4 weeks.     PT for balance, lower extremity strengthening and stair navigation.    To start on 8/27/23.     Routed high priority to care team.

## 2023-08-25 NOTE — NURSING NOTE
Chief Complaint   Patient presents with     Blood Draw     Labs drawn via  by RN. VS taken.     Labs drawn with vpt by rn.  Pt tolerated well.  VS taken.  Pt checked in for next appt.    Sajan Villagran RN   47

## 2023-08-29 NOTE — PATIENT INSTRUCTIONS
[facility] Main Line: [phone]    Call triage nurse with chills and/or temperature greater than or equal to 100.4, uncontrolled nausea/vomiting, diarrhea, constipation, dizziness, shortness of breath, chest pain, bleeding, unexplained bruising, or any new/concerning symptoms, questions/concerns.   If you are having any concerning symptoms or wish to speak to a provider before your next infusion visit, please call your care coordinator or triage to notify them so we can adequately serve you.   Triage Nurse Line: [phone]    If after hours, weekends, or holidays [phone]

## 2023-08-29 NOTE — PROGRESS NOTES
"Infusion Nursing Note:  Bonny Raymundo presents today for 1 pack platelets.    Patient seen by provider today: No   present during visit today: Not Applicable.    Note: Denies fevers/chills.  Has her persistent SOB w/exertion (has had for \"6-7 years\").  Reports pain in right lower rib cage area.  She said she's had this for about 2 weeks and that she discussed this with Dr. Spann.  It's no worse. Having a pillow against this area is helpful.  She reports occasional bleeding from gums when brushing her teeth.  She does use a soft tooth brush.  She denies bleeding from any other sites.  She was feeling slightly light headed this morning.  Her BP was 93/56 in the lab.  Rechecked in infusion, was 109/67.  She denied any further feelings of light headedness.      Sue Marquez RNCC spoke to Dr. Spann yesterday regarding Bonny's treatment schedule (for [facility]).  She's not scheduled until 9/12-9/15 (on wait list for 9/11).  Dr. Spann was fine with that.      Intravenous Access:  Peripheral IV placed in lab    Treatment Conditions:  Lab Results   Component Value Date    HGB 8.7 (L) 08/29/2023    WBC 3.3 (L) 08/29/2023    ANEU 0.9 (L) 08/29/2023    ANEUTAUTO 1.3 (L) 09/12/2022    PLT 10 (LL) 08/29/2023        Lab Results   Component Value Date     08/29/2023    POTASSIUM 4.0 08/29/2023    MAG 2.0 08/11/2023    CR 1.18 (H) 08/29/2023    LEO 9.7 08/29/2023    BILITOTAL 0.6 08/29/2023    ALBUMIN 3.1 (L) 08/29/2023    ALT <5 08/29/2023    AST 9 08/29/2023       Results reviewed, labs MET treatment parameters, ok to proceed with treatment.  Blood transfusion consent signed 8/2/23.      Post Infusion Assessment:  Patient tolerated infusion without incident.  Blood return noted pre and post infusion.  Site patent and intact, free from redness, edema or discomfort.  No evidence of extravasations.  Access discontinued per protocol.       Discharge Plan:   Patient declined prescription refills.  Copy of AVS reviewed " with patient and/or family.  [facility]  Patient discharged in stable condition accompanied by: self.  Departure Mode: Ambulatory.      Bianca Pacheco RN

## 2023-08-29 NOTE — NURSING NOTE
Chief Complaint   Patient presents with    Blood Draw     Labs drawn via PIV by RN in lab. VS taken.        Labs drawn via peripheral IV. Vital signs taken.   Lana Griggs RN

## 2023-09-03 NOTE — TELEPHONE ENCOUNTER
"Spoke with patient this morning and notified her that several attempts were made to contact her yesterday but her phone was giving a busy signal. She said her \"voicemail is full\". She provided a cell phone number to call back in the future that we will be able to leave messages on ([phone]). Patient was notified that DOROTA Patricia wants her to hold her cyclosporine dose this morning until she has her labs drawn. Patient states she already took her cyclosporine this morning and will have to reschedule to have that level drawn. She was also notified that she will provide a urine specimen today while at the lab and to report to the ER if she develops any bright red bleeding. Patient verbalized understanding. Called [facility] lab and they were able to schedule patient tomorrow 1/20/17 @ 0830 to have cyclosporine level drawn. Called patient back and left a message with appointment information for labs tomorrow at [facility] and gave her a reminder to hold the cyclosporine until after the labs are drawn. Left message for patient to call back and verify that she will be able to make this appointment.     ANABELLE Owens   "
Patient called to report she has a light pink color on the toilet paper that she notices after using the restroom. She denies any pink or red color in the toilet. She denies any fever, painful urination, burning with urination, or foul smelling urine. She states she does have some urinary frequency. She says symptoms started yesterday but she forgot to mention this to DOROTA Patricia at their visit. She is scheduled to have labs drawn tomorrow at [facility] and is requesting to have her urine checked at that time.     Paged DOROTA Patricia regarding patients symptoms. Sejal would like patient to have a UA and urine culture tomorrow while she is at the lab. If patient develops any bright red bleeding then she needs to be seen in the ER. Sejal would also like patient to have her cyclosporine level checked with her labs tomorrow. Patient should hold her morning dose of cyclosporine but bring it along to the lab appointment with her and take the dose after the labs have been drawn.     Attempted to contact patient several times with instructions but the patients phone kept giving a busy signal. No other numbers are listed and there is no answering machine to leave a message. Will try to contact patient tomorrow morning before her appointment at [facility].     ANABELLE Owens   
No

## 2023-09-04 NOTE — PROGRESS NOTES
Infusion Nursing Note:  Bonny Raymundo presents today for 2 unit PRBC.    Patient seen by provider today: No   present during visit today: Not Applicable.    Note: IB sent to Sue Marquez and Dr. Spann regarding patient [facility] appointment next week.       Intravenous Access:  Peripheral IV placed.    Treatment Conditions:  Lab Results   Component Value Date    HGB 7.0 (L) 09/04/2023    WBC 3.1 (L) 09/04/2023    ANEU 1.1 (L) 09/01/2023    ANEUTAUTO 1.3 (L) 09/12/2022    PLT 16 (LL) 09/04/2023        Results reviewed, labs MET treatment parameters, ok to proceed with treatment.  Blood transfusion consent signed 8/17/23.      Post Infusion Assessment:  Patient tolerated infusion without incident.  Blood return noted pre and post infusion.  Site patent and intact, free from redness, edema or discomfort.  No evidence of extravasations.  Access discontinued per protocol.       Discharge Plan:   Patient declined prescription refills.  Discharge instructions reviewed with: Patient.  Patient and/or family verbalized understanding of discharge instructions and all questions answered.  AVS to patient via [facility].  Patient will return 9/11/23 for next appointment.   Patient discharged in stable condition accompanied by: self.  Departure Mode: Ambulatory.      CHIKA BLUM RN

## 2023-09-09 NOTE — PATIENT INSTRUCTIONS
[facility] Triage and after hours / weekends / holidays:  [phone]    Please call the triage or after hours line if you experience a temperature greater than or equal to 100.4, shaking chills, have uncontrolled nausea, vomiting and/or diarrhea, dizziness, shortness of breath, chest pain, bleeding, unexplained bruising, or if you have any other new/concerning symptoms, questions or concerns.      If you are having any concerning symptoms or wish to speak to a provider before your next infusion visit, please call triage to notify them so we can adequately serve you.     If you need a refill on a narcotic prescription or other medication, please call before your infusion appointment.                September 2023 Sunday Monday Tuesday Wednesday Thursday Friday Saturday                            1    LAB   9:40 AM   (20 min.)    LAB ONLY   [facility] 2       3     4    ONC INFUSION 3 HR (180 MIN)   9:00 AM   (180 min.)    ONC INFUSION NURSE   [facility] 5     6     7     8     9    ONC INFUSION 3 HR (180 MIN)   9:00 AM   (180 min.)    ONC INFUSION NURSE   [facility]   10     11    LAB PERIPHERAL   2:30 PM   (15 min.)   [facility] DRAW   [facility]    ONC INFUSION 1 HR (60 MIN)   3:00 PM   (60 min.)    ONC INFUSION NURSE   [facility] 12    ONC INFUSION 3 HR (180 MIN)   2:00 PM   (180 min.)    ONC INFUSION NURSE   [facility] 13    LAB PERIPHERAL   9:00 AM   (15 min.)   [facility] DRAW   [facility]    RETURN CCSL   9:15 AM   (30 min.)   Minerva Spann MD   [facility]    ONC INFUSION 1 HR (60 MIN)  10:30 AM   (60 min.)    ONC INFUSION NURSE   [facility] 14    INFUSION 1 HR (60 MIN)  11:00 AM   (60 min.)    CANCER INFUSION NURSE   M  [facility] 15    ONC INFUSION 1 HR (60 MIN)  10:30 AM   (60 min.)   UC ONC INFUSION NURSE   [facility] 16       17     18     19    LAB PERIPHERAL  12:00 PM   (15 min.)   [facility] DRAW   [facility]    ONC INFUSION 3 HR (180 MIN)   2:00 PM   (180 min.)   UC ONC INFUSION NURSE   [facility] 20     21     22     23       24     25     26    LAB PERIPHERAL  10:15 AM   (15 min.)   [facility]RAW   [facility]    ONC INFUSION 3 HR (180 MIN)  12:00 PM   (180 min.)   UC ONC INFUSION NURSE   [facility] 27     28    LAB PERIPHERAL  10:30 AM   (15 min.)   [facility]RAW   [facility]    RETURN CCSL  10:45 AM   (30 min.)   Minerva Spann MD   [facility] 29 30 October 2023 Sunday Monday Tuesday Wednesday Thursday Friday Saturday   1     2     3    LAB PERIPHERAL  11:00 AM   (15 min.)   [facility]RAW   [facility]    ONC INFUSION 3 HR (180 MIN)   1:00 PM   (180 min.)   UC ONC INFUSION NURSE   [facility] 4     5     6     7       8     9     10    LAB PERIPHERAL  11:15 AM   (15 min.)   [facility]   [facility]    ONC INFUSION 3 HR (180 MIN)   1:00 PM   (180 min.)   UC ONC INFUSION NURSE   [facility] 11     12     13     14       15     16     17    LAB PERIPHERAL  10:15 AM   (15 min.)   [facility]   [facility]    ONC INFUSION 3 HR (180 MIN)  12:00 PM   (180 min.)   UC ONC INFUSION NURSE   [facility] 18     19     20     21       22     23     24    LAB PERIPHERAL  10:00 AM   (15 min.)   [facility]RAW   [facility]    ONC INFUSION 3 HR  (180 MIN)  12:00 PM   (180 min.)    ONC INFUSION NURSE   [facility] 25     26     27     28       29     30     31    LAB PERIPHERAL  10:00 AM   (15 min.)   [facility] DRAW   [facility]    ONC INFUSION 3 HR (180 MIN)  12:00 PM   (180 min.)    ONC INFUSION NURSE   [facility]                                    Recent Results (from the past 24 hour(s))   CBC with platelets and differential    Collection Time: 09/09/23  9:09 AM   Result Value Ref Range    WBC Count 4.5 4.0 - 11.0 10e3/uL    RBC Count 3.05 (L) 3.80 - 5.20 10e6/uL    Hemoglobin 8.9 (L) 11.7 - 15.7 g/dL    Hematocrit 28.4 (L) 35.0 - 47.0 %    MCV 93 78 - 100 fL    MCH 29.2 26.5 - 33.0 pg    MCHC 31.3 (L) 31.5 - 36.5 g/dL    RDW 14.9 10.0 - 15.0 %    Platelet Count 11 (LL) 150 - 450 10e3/uL   Manual Differential    Collection Time: 09/09/23  9:09 AM   Result Value Ref Range    % Neutrophils 28 %    % Lymphocytes 28 %    % Monocytes 28 %    % Eosinophils 0 %    % Basophils 0 %    % Blasts 16 %    Absolute Neutrophils 1.3 (L) 1.6 - 8.3 10e3/uL    Absolute Lymphocytes 1.3 0.8 - 5.3 10e3/uL    Absolute Monocytes 1.3 0.0 - 1.3 10e3/uL    Absolute Eosinophils 0.0 0.0 - 0.7 10e3/uL    Absolute Basophils 0.0 0.0 - 0.2 10e3/uL    Absolute Blasts 0.7 (H) <=0.0 10e3/uL    RBC Morphology Confirmed RBC Indices     Platelet Assessment  Automated Count Confirmed. Platelet morphology is normal.     Automated Count Confirmed. Platelet morphology is normal.

## 2023-09-09 NOTE — PROGRESS NOTES
Infusion Nursing Note:  Bonny Raymundo presents today for 1 unit plts.    Patient seen by provider today: No   present during visit today: Not Applicable.    Note: Patient presents to the infusion center today feeling fatigued but well. Reports minimal bleeding in her gums when she brushes her teeth. Ongoing SOB with exertion. No pain, dizziness, lightheadedness, fevers, chills or chest pain.    Intravenous Access:  Peripheral IV placed.    Treatment Conditions:   Latest Reference Range & Units 09/09/23 09:09   WBC 4.0 - 11.0 10e3/uL 4.5   Hemoglobin 11.7 - 15.7 g/dL 8.9 (L)   Hematocrit 35.0 - 47.0 % 28.4 (L)   Platelet Count 150 - 450 10e3/uL 11 (LL)   RBC Count 3.80 - 5.20 10e6/uL 3.05 (L)   MCV 78 - 100 fL 93   MCH 26.5 - 33.0 pg 29.2   MCHC 31.5 - 36.5 g/dL 31.3 (L)   RDW 10.0 - 15.0 % 14.9   % Neutrophils % 28   % Lymphocytes % 28   % Monocytes % 28   % Eosinophils % 0   % Basophils % 0   % Blasts % 16   Absolute Basophils 0.0 - 0.2 10e3/uL 0.0   Absolute Neutrophil 1.6 - 8.3 10e3/uL 1.3 (L)   Absolute Lymphocytes 0.8 - 5.3 10e3/uL 1.3   Absolute Monocytes 0.0 - 1.3 10e3/uL 1.3   Absolute Eosinophils 0.0 - 0.7 10e3/uL 0.0   Absolute Blasts <=0.0 10e3/uL 0.7 (H)     Results reviewed, labs MET treatment parameters, ok to proceed with treatment.    Post Infusion Assessment:  Patient tolerated infusion without incident.  Blood return noted pre and post infusion.  No evidence of extravasations.  Access discontinued per protocol.     Discharge Plan:   Patient declined prescription refills.  Discharge instructions reviewed with: Patient.  Patient and/or family verbalized understanding of discharge instructions and all questions answered.  Copy of AVS reviewed with patient and/or family.  Patient will return 9/11 for next appointment.  [facility]    Patient discharged in stable condition accompanied by: self.  Departure Mode: Ambulatory with cane.      Rosalia Salguero RN

## 2023-09-11 NOTE — PATIENT INSTRUCTIONS
[facility] Triage and after hours / weekends / holidays:  [phone]    Please call the triage or after hours line if you experience a temperature greater than or equal to 100.4, shaking chills, have uncontrolled nausea, vomiting and/or diarrhea, dizziness, shortness of breath, chest pain, bleeding, unexplained bruising, or if you have any other new/concerning symptoms, questions or concerns.      If you are having any concerning symptoms or wish to speak to a provider before your next infusion visit, please call triage to notify them so we can adequately serve you.     If you need a refill on a narcotic prescription or other medication, please call before your infusion appointment.

## 2023-09-11 NOTE — PROGRESS NOTES
Infusion Nursing Note:  Bonny Raymundo presents today for Cycle 3 Day 1 Azacitadine (Vidaza).    Patient seen by provider today: No   present during visit today: Not Applicable.    Note: Bonny comes into the clinic today doing well overall and has no concerns to offer at this visit. She does not attest to any abnormal pain/chest tightness/signs of infection/dizziness/sensation changes/bruising/swelling/diarrhea/constipation/nausea/urinary issues/vision or hearing changes. Fatigue and SOB not new.    TORB Dr. Spann/Neda Andrade RN 9/11/23 @1445: Ok to go ahead with Vidaza without CMP resulted and CBC from 9/9      Intravenous Access:  Labs drawn without difficulty.    Treatment Conditions:  Lab Results   Component Value Date    HGB 8.1 (L) 09/11/2023    WBC 3.7 (L) 09/11/2023    ANEU 1.1 (L) 09/11/2023    ANEUTAUTO 1.3 (L) 09/12/2022    PLT 13 (LL) 09/11/2023        Lab Results   Component Value Date     09/11/2023    POTASSIUM 4.2 09/11/2023    MAG 2.0 08/11/2023    CR 1.02 (H) 09/11/2023    LEO 9.4 09/11/2023    BILITOTAL 0.5 09/11/2023    ALBUMIN 3.1 (L) 09/11/2023    ALT <5 09/11/2023    AST 12 09/11/2023       Results reviewed, labs MET treatment parameters, ok to proceed with treatment.      Post Infusion Assessment:  Patient tolerated Vidaza injection to right lower abdomen without incident.       Discharge Plan:   Patient declined prescription refills.  Discharge instructions reviewed with: Patient.  Patient and/or family verbalized understanding of discharge instructions and all questions answered.  AVS to patient via [employer].  Patient will return 9/12 for next appointment.   Patient discharged in stable condition accompanied by: self.  Departure Mode: Ambulatory.      Neda Andrade, ANABELLE

## 2023-09-12 NOTE — PATIENT INSTRUCTIONS
[facility] Triage and after hours / weekends / holidays:  [phone]    Please call the triage or after hours line if you experience a temperature greater than or equal to 100.4, shaking chills, have uncontrolled nausea, vomiting and/or diarrhea, dizziness, shortness of breath, chest pain, bleeding, unexplained bruising, or if you have any other new/concerning symptoms, questions or concerns.      If you are having any concerning symptoms or wish to speak to a provider before your next infusion visit, please call triage to notify them so we can adequately serve you.     If you need a refill on a narcotic prescription or other medication, please call before your infusion appointment.                September 2023 Sunday Monday Tuesday Wednesday Thursday Friday Saturday                            1    LAB   9:40 AM   (20 min.)    LAB ONLY   [facility] 2       3     4    ONC INFUSION 3 HR (180 MIN)   9:00 AM   (180 min.)    ONC INFUSION NURSE   [facility] 5     6     7     8     9    ONC INFUSION 3 HR (180 MIN)   9:00 AM   (180 min.)    ONC INFUSION NURSE   [facility]   10     11    LAB PERIPHERAL   2:30 PM   (15 min.)   [facility] DRAW   [facility]    ONC INFUSION 1 HR (60 MIN)   3:00 PM   (60 min.)    ONC INFUSION NURSE   [facility] 12    ONC INFUSION 3 HR (180 MIN)   2:00 PM   (180 min.)    ONC INFUSION NURSE   [facility] 13    LAB PERIPHERAL   9:00 AM   (15 min.)   [facility] DRAW   [facility]    RETURN CCSL   9:15 AM   (30 min.)   Minerva Spann MD   [facility]    ONC INFUSION 1 HR (60 MIN)  10:30 AM   (60 min.)    ONC INFUSION NURSE   [facility] 14    INFUSION 1 HR (60 MIN)  11:00 AM   (60 min.)    CANCER INFUSION NURSE   M  [facility] 15    ONC INFUSION 1 HR (60 MIN)  10:30 AM   (60 min.)   UC ONC INFUSION NURSE   [facility] 16       17     18     19    LAB PERIPHERAL  12:00 PM   (15 min.)   [facility]RAW   [facility]    ONC INFUSION 3 HR (180 MIN)   2:00 PM   (180 min.)   UC ONC INFUSION NURSE   [facility] 20     21     22     23       24     25     26    LAB PERIPHERAL  10:15 AM   (15 min.)   [facility]RAW   [facility]    ONC INFUSION 3 HR (180 MIN)  12:00 PM   (180 min.)   UC ONC INFUSION NURSE   [facility] 27     28    LAB PERIPHERAL  10:30 AM   (15 min.)   [facility]RAW   [facility]    RETURN CCSL  10:45 AM   (30 min.)   Minerva Spann MD   [facility] 29 30 October 2023 Sunday Monday Tuesday Wednesday Thursday Friday Saturday   1     2     3    LAB PERIPHERAL  11:00 AM   (15 min.)   [facility] DRAW   [facility]    ONC INFUSION 3 HR (180 MIN)   1:00 PM   (180 min.)   UC ONC INFUSION NURSE   [facility] 4     5     6     7       8     9     10    LAB PERIPHERAL  11:15 AM   (15 min.)   [facility] DRAW   [facility]    ONC INFUSION 3 HR (180 MIN)   1:00 PM   (180 min.)   UC ONC INFUSION NURSE   [facility] 11     12     13     14       15     16     17    LAB PERIPHERAL  10:15 AM   (15 min.)   [facility]RAW   [facility]    ONC INFUSION 3 HR (180 MIN)  12:00 PM   (180 min.)   UC ONC INFUSION NURSE   [facility] 18     19     20     21       22     23     24    LAB PERIPHERAL  10:00 AM   (15 min.)   [facility] DRAW   [facility]    ONC INFUSION 3 HR  (180 MIN)  12:00 PM   (180 min.)    ONC INFUSION NURSE   [facility] 25     26     27     28       29     30     31    LAB PERIPHERAL  10:00 AM   (15 min.)   [facility] LAB DRAW   [facility]    ONC INFUSION 3 HR (180 MIN)  12:00 PM   (180 min.)    ONC INFUSION NURSE   [facility]                                     Lab Results:  No results found for this or any previous visit (from the past 12 hour(s)).

## 2023-09-12 NOTE — TELEPHONE ENCOUNTER
Pantoprazole 20mg EC tab  Last prescribing provider: Dr. Rafita Rogel on 6/14/22     Last clinic visit date: 8/16/23 w/ Dr. Spann    Recommendations for requested medication (if none, N/A): NA    Any other pertinent information (if none, N/A): NA    Refilled: Y/N, if NO, why?

## 2023-09-12 NOTE — PROGRESS NOTES
Infusion Nursing Note:  Bonny Raymundo presents today for Cycle 3 Day 2 Vidaza.    Patient seen by provider today: No   present during visit today: Not Applicable.    Note: Pt presents to infusion feeling well. She reports that her fatigue and SOB on exertion are at baseline. She had a small gum bleed after brushing her teeth, and this prompted her to switch over to a soft toothbrush. She denies fevers/chills, cough/congestion, chest pain, nausea, rosie bleeding, loose stools/constipation, pain or further concerns.    Bonny confirms she took PO Zofran prior to her arrival today.     Treatment Conditions:  Lab Results   Component Value Date    HGB 8.1 (L) 09/11/2023    WBC 3.7 (L) 09/11/2023    ANEU 1.1 (L) 09/11/2023    ANEUTAUTO 1.3 (L) 09/12/2022    PLT 13 (LL) 09/11/2023        Lab Results   Component Value Date     09/11/2023    POTASSIUM 4.2 09/11/2023    MAG 2.0 08/11/2023    CR 1.02 (H) 09/11/2023    LEO 9.4 09/11/2023    BILITOTAL 0.5 09/11/2023    ALBUMIN 3.1 (L) 09/11/2023    ALT <5 09/11/2023    AST 12 09/11/2023       Results reviewed, labs MET treatment parameters, ok to proceed with treatment. Labs 9/11.    CBC borderline for transfusions yesterday - labs are scheduled to be drawn tomorrow for possible transfusions.    Post Infusion Assessment:  Patient tolerated injection to LLQ without incident.       Discharge Plan:   Patient declined prescription refills.  Discharge instructions reviewed with: Patient.  Patient and/or family verbalized understanding of discharge instructions and all questions answered.  AVS to patient via "[employer].  Patient will return 9/13/23 for next appointment.   Patient discharged in stable condition accompanied by: self.  Departure Mode: Ambulatory.      Tiffanie Dominguez RN

## 2023-09-13 NOTE — NURSING NOTE
Oncology Rooming Note    September 13, 2023 9:29 AM   Bonny Raymundo is a 77 year old female who presents for:    Chief Complaint   Patient presents with    Oncology Clinic Visit     Aplastic anemia     Initial Vitals: /68   Pulse 81   Temp 97.7  F (36.5  C)   Resp 16   Wt 44.3 kg (97 lb 11.2 oz)   SpO2 99%   BMI 19.08 kg/m   Estimated body mass index is 19.08 kg/m  as calculated from the following:    Height as of 8/8/23: 1.524 m (5').    Weight as of this encounter: 44.3 kg (97 lb 11.2 oz). Body surface area is 1.37 meters squared.  No Pain (0) Comment: Data Unavailable   No LMP recorded. Patient has had a hysterectomy.  Allergies reviewed: Yes  Medications reviewed: Yes    Medications: Medication refills not needed today.  Pharmacy name entered into [employer]:    [facility] [place], [place] - [address], [address]  [facility] [place], [place] - [address] [id number] - [place], [place] - [address] [facility]    Clinical concerns:       Sourav Rae

## 2023-09-13 NOTE — NURSING NOTE
Chief Complaint   Patient presents with    Oncology Clinic Visit     Aplastic anemia    Blood Draw     Labs drawn from PIV placed by RN. Line flushed with saline. Vitals taken. Pt checked in for appointment(s).       Santa Pool RN

## 2023-09-13 NOTE — PROGRESS NOTES
Hematology Clinic Visit  In person visit     Problem list:  - Myelodysplastic syndrome with excess blasts-2 (MDS-EB 2).  Bone marrow biopsy 12/15/2022.   IPSS-R score 8.5-very high - based on cytogenetics -7, > 10% blasts, hemoglobin less than 8, platelet , ANC less than 0.8     Final Diagnosis   A.  Bone Marrow Biopsy from left posterior iliac crest and peripheral smear morphology:  - Myelodysplastic syndrome with excess blasts 2 (MDS-EB2) showing:       - Increased marrow blasts (13.3% on imprint smear differential, 12% on flow cytometry).       - Mildly hypercellular marrow for age , 30-35% .      - Moderately increased marrow fibrosis (MF-2).      - No appreciable dysplasia.      - Increased marrow storage iron with 84% sideroblasts, 6% ringed sideroblasts      - Peripheral blood pancytopenia showing:            - Moderate macrocytic, normochromic anemia without evidence of hemolysis or increased red cell regeneration, rare circulating nucleated red blood cell.           - Moderate leukopenia with rare circulating blasts without Bernice rods.           - Moderate to marked thrombocytopenia.   Electronically signed by Rick Bangura MD on 12/19/2022    Cytogenetics-46,XX,-7,+21[20]  Detected Alterations of Known or Potential Pathogenicity: RUNX1 G199E   Detected Alterations of Uncertain Significance: None   Genes with No Detected Clinically Significant Alterations: ABL1, ALK, ASXL1, ATRX, BCOR, CALR, CBL, CEBPA, CSF3R, DNMT3A, ETV6, EZH2, FLT3, GATA1, GATA2, HRAS, IDH1, IDH2, JAK1, JAK2, JAK3, KIT, KMT2A, KRAS, MPL, NF1, NPM1, NRAS, PDGFRA, PDGFRB, PHF6, PTPN11, JAMA, SETBP1, SF1, SF3A1, SF3B1, SH2B3, SRSF2, TET2, TP53, U2AF2, WT1, ZRSR2     She was initially diagnosed as aplastic anemia-diagnosed 9/26/2016, repeat bone marrow biopsy 12/22/16- bone marrow <5% cellular with no dysplasia.  Normal cytogenetics. PNH negative. 1/9/ 2017-treated with ATG, methylprednisolone, cyclosporine, and eltrombopag.   Cyclosporine discontinued May 2021, eltrombopag discontinued June 2021 due to abnormal liver function test.    Partial remission with the immunotherapy, which is gradually weaned.  Now transfusion dependent  - Chronic kidney disease stage IV  - Atrial fibrillation-on apixaban chronically  -History of iron deficiency anemia- Venofer 300 mg IV 7/7/2021, 8/6/2021, 4/14/2022  -History of B12 deficiency  -MGUS  - COPD  - GERD  -History of lower GI bleed 2017  -Severely decreased bilateral hearing  - Benign paroxysmal positional vertigo  - History of unprovoked left leg deep vein thrombosis (DVT)  3/17/21 and bilateral pulmonary embolism (PE) 9/11/12  - Osteoporosis with compression fracture of spine.  - Shingles right face  2022  - Fall with right hip fracture 1/16/2023  - Subdural hematoma related to fall from standing 1/16/2023-no surgical intervention needed    Interval history:  is here for follow-up of MDS-EB2.  I saw her on 8/16/2023.  She is by herself today.   She received azacitidine 75 mg per metered squared subcu daily 5/22 - 5/26/23, and then dose decreased to 50 mg/m2 daily x 5, 6/19-6/23. She then had azacitidine delayed due to low counts and hospitalization for kidney stone. She is in the middle of cycle 3 of azacitidine.  She has needed regular red cell and platelet transfusions.    She reports that she is feeling very fatigued.  It is difficult to come 5 days in a row to clinic for the azacitidine, especially when it is a different time each day.  She has occasional bleeding from her gums, and some bruises on her arms, but no other bleeding symptoms.  She says the roof of her mouth is a bit sore.  She still able to eat, although does cook simple meals.  Takes a bottle of Ensure about every other day.  She says she is always short of breath with exertion, and would like to keep her hemoglobin around 9, but is not sure if the red blood cell transfusions are helping much and how she feels. She has  some pain left forearm, which she attributes to h/o shingles- uses acetaminophen, which helps. Gabapentin not helpful.  No fevers, chills, sweats.  No nausea, diarrhea, dysuria.  She feels that her left arm is a little bit weak, wonders if it is her shoulder, tends to lie on her left side at night.  No numbness or pain in the left arm.    She says that she should have meals delivered starting in a couple of weeks. She has someone clean house for her.       PHYSICAL EXAMINATION:  /68   Pulse 81   Temp 97.7  F (36.5  C)   Resp 16   Wt 44.3 kg (97 lb 11.2 oz)   SpO2 99%   BMI 19.08 kg/m      General appearance:  Patient is  77 year old woman in no acute distress. She is pepe to  walk without assistance, has a cane but doesn't really use it.   Cachectic.  HEENT:  No pallor, icterus. No obvious mucositis, ulcer. Thrush or bleeding in mouth. .     . Lungs:  Clear to auscultation bilaterally.   Heart:  Regular rate and rhythm; no S3 S4 or murmer.     Abdomen:  Positive bowel sounds, soft and nontender, nondistended.  No hepatomegaly. No splenomegaly appreciated.    Extremities:  No joint swelling or tenderness.  No ankle edema.     Skin:  No rash, no petechiae. Extensie senile purpura on forearms.   Neuro, alert, answerw questions appropriately, decreased hearing (not wearing hearing aids), full range of motion both arms, no tremor, bilateral  is ~ 4/5 strength- no obvious difference between R and L.       Labs:   Latest Reference Range & Units 09/13/23 09:22   Sodium 136 - 145 mmol/L 138   Potassium 3.4 - 5.3 mmol/L 4.4   Chloride 98 - 107 mmol/L 102   Carbon Dioxide (CO2) 22 - 29 mmol/L 29   Urea Nitrogen 8.0 - 23.0 mg/dL 34.7 (H)   Creatinine 0.51 - 0.95 mg/dL 1.13 (H)   GFR Estimate >60 mL/min/1.73m2 49 (L)   Calcium 8.8 - 10.2 mg/dL 9.9   Anion Gap 7 - 15 mmol/L 7   Albumin 3.5 - 5.2 g/dL 3.2 (L)   Protein Total 6.4 - 8.3 g/dL 7.4   Alkaline Phosphatase 35 - 104 U/L 92   ALT 0 - 50 U/L 7   AST 0 - 45  U/L 11   Bilirubin Total <=1.2 mg/dL 0.5   Glucose 70 - 99 mg/dL 107 (H)   WBC 4.0 - 11.0 10e3/uL 3.5 (L)   Hemoglobin 11.7 - 15.7 g/dL 8.3 (L)   Hematocrit 35.0 - 47.0 % 26.0 (L)   Platelet Count 150 - 450 10e3/uL 12 (LL)   RBC Count 3.80 - 5.20 10e6/uL 2.83 (L)   MCV 78 - 100 fL 92   MCH 26.5 - 33.0 pg 29.3   MCHC 31.5 - 36.5 g/dL 31.9   RDW 10.0 - 15.0 % 14.8   % Neutrophils % 24   % Lymphocytes % 32   % Monocytes % 28   % Eosinophils % 1   % Basophils % 0   % Blasts % 15   Absolute Basophils 0.0 - 0.2 10e3/uL 0.0   Absolute Neutrophil 1.6 - 8.3 10e3/uL 0.8 (L)   Absolute Lymphocytes 0.8 - 5.3 10e3/uL 1.1   Absolute Monocytes 0.0 - 1.3 10e3/uL 1.0   Absolute Eosinophils 0.0 - 0.7 10e3/uL 0.0   Absolute Blasts <=0.0 10e3/uL 0.5 (H)         Assessment and recommendation:     # MDS-EB 2- IPSS-R score very high risk-this means her median overall survival is 0.8 years and it 25% chance of developing acute leukemia within 0.7 years.  She told me today that she does not want to schedule more azacitidine. Part of her complaint is that it is at different times each day. I indicated that if we schedule far enough iin advance, we can get the azacitidine scheduled at the same time each day. She prefers to wait to see how she feels. Since it is not clear that the azacitidine is helping, I think it is reasonable for her to not continue with additional cycles.   - Complete Azacitidine cycle 3 this week  -No further cycles  -Refill prophylactic levofloxacin and acyclovir.     #Anemia, thrombocytopenia-this is due to mainly to the MDS.  She has not responded to Aranesp.   For transfusions, either the [facility] or [facility] is okay, she prefers not to have to come the day before for labs to get the type and screen.  I will increase the red blood cell parameter so that she is transfused 1 unit if the hemoglobin is 7-8.5.     - CBCDP, type and screen weekly  -Transfuse 1 unit PRBCs for hemoglobin 7.0-8.5, 2 units for hemoglobin < 7.     -transfuse 1 unit platelets for platelet count <10k or serious bleeding.     #Frailty. Left arm weakness- her weight has remained about the same over the past few months.  Nothing objective on neurologic exam reagarding left arm- may be her shoulder, no specific intervention.   -Encouraged her to drink 1 bottle of Ensure daily.-Palliative care consult- hasn't happened.         #Chronic kidney disease-creatinine a bit higher today  -Push oral fluids     #Subdural hematoma and subarachnoid hemorrhage after a fall- she had a DVT a couple of years ago. Risk of bleeding while on anticoagulation outweighs risk of thrombosis in view of significant chronic thrombocytopenia.  Because she is not having significant bleeding symptoms, and platelet transfusions do not last very long, it is not practical to do chronic platelet transfusions, and would be unlikely to change her overall prognosis.  -No apixaban or other anticoagulant        #CODE STATUS-   a DO NOT RESUSCITATE order was placed in chart 1/11/2023.       RTC 10/12 and reschedule transfusion to coincide     Time: I spent a total of 45 minutes on the day of the visit. Please see the note for further information on patient assessment and treatment.         Minerva Spann MD  Hematology

## 2023-09-13 NOTE — PROGRESS NOTES
"Infusion Nursing Note:  Bonny Raymundo presents today for Cycle 3 Day 1 Vidaza and 1 unit PRBC transfusion.    Patient seen by provider today: Yes:    present during visit today: Not Applicable.    Note:     At the end of RBC transfusion (line flushing with saline) pt reported persistent itching in her left earlobe that had been going on for \"maybe 30 mins\". Pt states \"it feels like a bug bite\".    Left ear lobe noted to be slightly red/pink from scratching. No hives. Pt denies any respiratory changes and chest pain.. VSS. Writer notified Dr. Spann.     Suspected transfusion reaction work up process initiated. VSS. Itching improving.   Pt okay to discharge home. Informed her she can take benadryl at home if itching persists but if she develops any new or worsening symptoms like SOB, itching in throat, tongue swelling, chest pain or hives she should call 911.     Update after response from Dr. Spann via [employer] message on 9/13.   I want to avoid giving this petite elderly woman benadryl. Her reaction didn't seem that severe, so let's see how she does next time before adding premeds   Minerva Spann     Intravenous Access:  Peripheral IV placed.    Treatment Conditions:  Lab Results   Component Value Date    HGB 8.3 (L) 09/13/2023    WBC 3.5 (L) 09/13/2023    ANEU 0.8 (L) 09/13/2023    ANEUTAUTO 1.3 (L) 09/12/2022    PLT 12 (LL) 09/13/2023     Dr. Spann increased parameter today to transfuse if less than or equal to 8.5.     Post Infusion Assessment:  Patient tolerated infusion - see above.   Patient tolerated injection without incident to RIGHT abdomen  Blood return noted pre and post infusion.  No evidence of extravasations.  Access discontinued per protocol.       Discharge Plan:   Discharge instructions reviewed with: Patient.  AVS to patient via [employer].  Patient will return 9/14 for next appointment.   Departure Mode: Ambulatory.      Eboni Atkinson RN  "

## 2023-09-13 NOTE — LETTER
9/13/2023         RE: Bonny Raymundo  [address]  [facility] [id number]        Dear Colleague,    Thank you for referring your patient, Bonny Raymundo, to the [facility]. Please see a copy of my visit note below.    Hematology Clinic Visit  In person visit     Problem list:  - Myelodysplastic syndrome with excess blasts-2 (MDS-EB 2).  Bone marrow biopsy 12/15/2022.   IPSS-R score 8.5-very high - based on cytogenetics -7, > 10% blasts, hemoglobin less than 8, platelet , ANC less than 0.8     Final Diagnosis   A.  Bone Marrow Biopsy from left posterior iliac crest and peripheral smear morphology:  - Myelodysplastic syndrome with excess blasts 2 (MDS-EB2) showing:       - Increased marrow blasts (13.3% on imprint smear differential, 12% on flow cytometry).       - Mildly hypercellular marrow for age , 30-35% .      - Moderately increased marrow fibrosis (MF-2).      - No appreciable dysplasia.      - Increased marrow storage iron with 84% sideroblasts, 6% ringed sideroblasts      - Peripheral blood pancytopenia showing:            - Moderate macrocytic, normochromic anemia without evidence of hemolysis or increased red cell regeneration, rare circulating nucleated red blood cell.           - Moderate leukopenia with rare circulating blasts without Bernice rods.           - Moderate to marked thrombocytopenia.   Electronically signed by Rick Bangura MD on 12/19/2022    Cytogenetics-46,XX,-7,+21[20]  Detected Alterations of Known or Potential Pathogenicity: RUNX1 G199E   Detected Alterations of Uncertain Significance: None   Genes with No Detected Clinically Significant Alterations: ABL1, ALK, ASXL1, ATRX, BCOR, CALR, CBL, CEBPA, CSF3R, DNMT3A, ETV6, EZH2, FLT3, GATA1, GATA2, HRAS, IDH1, IDH2, JAK1, JAK2, JAK3, KIT, KMT2A, KRAS, MPL, NF1, NPM1, NRAS, PDGFRA, PDGFRB, PHF6, PTPN11, JAMA, SETBP1, SF1, SF3A1, SF3B1, SH2B3, SRSF2, TET2, TP53, U2AF2, WT1, ZRSR2     She  was initially diagnosed as aplastic anemia-diagnosed 9/26/2016, repeat bone marrow biopsy 12/22/16- bone marrow <5% cellular with no dysplasia.  Normal cytogenetics. PNH negative. 1/9/ 2017-treated with ATG, methylprednisolone, cyclosporine, and eltrombopag.  Cyclosporine discontinued May 2021, eltrombopag discontinued June 2021 due to abnormal liver function test.    Partial remission with the immunotherapy, which is gradually weaned.  Now transfusion dependent  - Chronic kidney disease stage IV  - Atrial fibrillation-on apixaban chronically  -History of iron deficiency anemia- Venofer 300 mg IV 7/7/2021, 8/6/2021, 4/14/2022  -History of B12 deficiency  -MGUS  - COPD  - GERD  -History of lower GI bleed 2017  -Severely decreased bilateral hearing  - Benign paroxysmal positional vertigo  - History of unprovoked left leg deep vein thrombosis (DVT)  3/17/21 and bilateral pulmonary embolism (PE) 9/11/12  - Osteoporosis with compression fracture of spine.  - Shingles right face  2022  - Fall with right hip fracture 1/16/2023  - Subdural hematoma related to fall from standing 1/16/2023-no surgical intervention needed    Interval history:  is here for follow-up of MDS-EB2.  I saw her on 8/16/2023.  She is by herself today.   She received azacitidine 75 mg per metered squared subcu daily 5/22 - 5/26/23, and then dose decreased to 50 mg/m2 daily x 5, 6/19-6/23. She then had azacitidine delayed due to low counts and hospitalization for kidney stone. She is in the middle of cycle 3 of azacitidine.  She has needed regular red cell and platelet transfusions.    She reports that she is feeling very fatigued.  It is difficult to come 5 days in a row to clinic for the azacitidine, especially when it is a different time each day.  She has occasional bleeding from her gums, and some bruises on her arms, but no other bleeding symptoms.  She says the roof of her mouth is a bit sore.  She still able to eat, although does cook  simple meals.  Takes a bottle of Ensure about every other day.  She says she is always short of breath with exertion, and would like to keep her hemoglobin around 9, but is not sure if the red blood cell transfusions are helping much and how she feels. She has some pain left forearm, which she attributes to h/o shingles- uses acetaminophen, which helps. Gabapentin not helpful.  No fevers, chills, sweats.  No nausea, diarrhea, dysuria.  She feels that her left arm is a little bit weak, wonders if it is her shoulder, tends to lie on her left side at night.  No numbness or pain in the left arm.    She says that she should have meals delivered starting in a couple of weeks. She has someone clean house for her.       PHYSICAL EXAMINATION:  /68   Pulse 81   Temp 97.7  F (36.5  C)   Resp 16   Wt 44.3 kg (97 lb 11.2 oz)   SpO2 99%   BMI 19.08 kg/m      General appearance:  Patient is  77 year old woman in no acute distress. She is pepe to  walk without assistance, has a cane but doesn't really use it.   Cachectic.  HEENT:  No pallor, icterus. No obvious mucositis, ulcer. Thrush or bleeding in mouth. .     . Lungs:  Clear to auscultation bilaterally.   Heart:  Regular rate and rhythm; no S3 S4 or murmer.     Abdomen:  Positive bowel sounds, soft and nontender, nondistended.  No hepatomegaly. No splenomegaly appreciated.    Extremities:  No joint swelling or tenderness.  No ankle edema.     Skin:  No rash, no petechiae. Extensie senile purpura on forearms.   Neuro, alert, answerw questions appropriately, decreased hearing (not wearing hearing aids), full range of motion both arms, no tremor, bilateral  is ~ 4/5 strength- no obvious difference between R and L.       Labs:   Latest Reference Range & Units 09/13/23 09:22   Sodium 136 - 145 mmol/L 138   Potassium 3.4 - 5.3 mmol/L 4.4   Chloride 98 - 107 mmol/L 102   Carbon Dioxide (CO2) 22 - 29 mmol/L 29   Urea Nitrogen 8.0 - 23.0 mg/dL 34.7 (H)   Creatinine 0.51 -  0.95 mg/dL 1.13 (H)   GFR Estimate >60 mL/min/1.73m2 49 (L)   Calcium 8.8 - 10.2 mg/dL 9.9   Anion Gap 7 - 15 mmol/L 7   Albumin 3.5 - 5.2 g/dL 3.2 (L)   Protein Total 6.4 - 8.3 g/dL 7.4   Alkaline Phosphatase 35 - 104 U/L 92   ALT 0 - 50 U/L 7   AST 0 - 45 U/L 11   Bilirubin Total <=1.2 mg/dL 0.5   Glucose 70 - 99 mg/dL 107 (H)   WBC 4.0 - 11.0 10e3/uL 3.5 (L)   Hemoglobin 11.7 - 15.7 g/dL 8.3 (L)   Hematocrit 35.0 - 47.0 % 26.0 (L)   Platelet Count 150 - 450 10e3/uL 12 (LL)   RBC Count 3.80 - 5.20 10e6/uL 2.83 (L)   MCV 78 - 100 fL 92   MCH 26.5 - 33.0 pg 29.3   MCHC 31.5 - 36.5 g/dL 31.9   RDW 10.0 - 15.0 % 14.8   % Neutrophils % 24   % Lymphocytes % 32   % Monocytes % 28   % Eosinophils % 1   % Basophils % 0   % Blasts % 15   Absolute Basophils 0.0 - 0.2 10e3/uL 0.0   Absolute Neutrophil 1.6 - 8.3 10e3/uL 0.8 (L)   Absolute Lymphocytes 0.8 - 5.3 10e3/uL 1.1   Absolute Monocytes 0.0 - 1.3 10e3/uL 1.0   Absolute Eosinophils 0.0 - 0.7 10e3/uL 0.0   Absolute Blasts <=0.0 10e3/uL 0.5 (H)         Assessment and recommendation:     # MDS-EB 2- IPSS-R score very high risk-this means her median overall survival is 0.8 years and it 25% chance of developing acute leukemia within 0.7 years.  She told me today that she does not want to schedule more azacitidine. Part of her complaint is that it is at different times each day. I indicated that if we schedule far enough iin advance, we can get the azacitidine scheduled at the same time each day. She prefers to wait to see how she feels. Since it is not clear that the azacitidine is helping, I think it is reasonable for her to not continue with additional cycles.   - Complete Azacitidine cycle 3 this week  -No further cycles  -Refill prophylactic levofloxacin and acyclovir.     #Anemia, thrombocytopenia-this is due to mainly to the MDS.  She has not responded to Aranesp.   For transfusions, either the [facility] or [facility] is okay, she prefers not to have to come the day before for  labs to get the type and screen.  I will increase the red blood cell parameter so that she is transfused 1 unit if the hemoglobin is 7-8.5.     - CBCDP, type and screen weekly  -Transfuse 1 unit PRBCs for hemoglobin 7.0-8.5, 2 units for hemoglobin < 7.    -transfuse 1 unit platelets for platelet count <10k or serious bleeding.     #Frailty. Left arm weakness- her weight has remained about the same over the past few months.  Nothing objective on neurologic exam reagarding left arm- may be her shoulder, no specific intervention.   -Encouraged her to drink 1 bottle of Ensure daily.-Palliative care consult- hasn't happened.         #Chronic kidney disease-creatinine a bit higher today  -Push oral fluids     #Subdural hematoma and subarachnoid hemorrhage after a fall- she had a DVT a couple of years ago. Risk of bleeding while on anticoagulation outweighs risk of thrombosis in view of significant chronic thrombocytopenia.  Because she is not having significant bleeding symptoms, and platelet transfusions do not last very long, it is not practical to do chronic platelet transfusions, and would be unlikely to change her overall prognosis.  -No apixaban or other anticoagulant        #CODE STATUS-   a DO NOT RESUSCITATE order was placed in chart 1/11/2023.       RTC 10/12 and reschedule transfusion to coincide     Time: I spent a total of 45 minutes on the day of the visit. Please see the note for further information on patient assessment and treatment.         Minerva Spann MD  Hematology

## 2023-09-13 NOTE — PATIENT INSTRUCTIONS
[facility] Triage and after hours / weekends / holidays:  [phone]    Please call the triage or after hours line if you experience a temperature greater than or equal to 100.4, shaking chills, have uncontrolled nausea, vomiting and/or diarrhea, dizziness, shortness of breath, chest pain, bleeding, unexplained bruising, or if you have any other new/concerning symptoms, questions or concerns.      If you are having any concerning symptoms or wish to speak to a provider before your next infusion visit, please call triage to notify them so we can adequately serve you.     If you need a refill on a narcotic prescription or other medication, please call before your infusion appointment.

## 2023-09-14 NOTE — PROGRESS NOTES
Infusion Nursing Note:  Bonny Raymundo presents today for C3D4 Vidaza.    Patient seen by provider today: No   present during visit today: Not Applicable.    Note: Pt reports no new complaints or symptoms. Pt denies any itchiness from yesterday's transfusion reaction.      Intravenous Access:  No Intravenous access/labs at this visit.    Treatment Conditions:  Lab Results   Component Value Date    HGB 8.3 (L) 09/13/2023    WBC 3.5 (L) 09/13/2023    ANEU 0.8 (L) 09/13/2023    ANEUTAUTO 1.3 (L) 09/12/2022    PLT 12 (LL) 09/13/2023        Lab Results   Component Value Date     09/13/2023    POTASSIUM 4.4 09/13/2023    MAG 2.0 08/11/2023    CR 1.13 (H) 09/13/2023    LEO 9.9 09/13/2023    BILITOTAL 0.5 09/13/2023    ALBUMIN 3.2 (L) 09/13/2023    ALT 7 09/13/2023    AST 11 09/13/2023       Results reviewed, labs MET treatment parameters, ok to proceed with treatment.      Post Infusion Assessment:  Patient tolerated injection without incident.  Site patent and intact, free from redness, edema or discomfort.  No evidence of extravasations.       Discharge Plan:   Discharge instructions reviewed with: Patient.  Patient and/or family verbalized understanding of discharge instructions and all questions answered.  Copy of AVS reviewed with patient and/or family.  Patient will return as previously scheduled for next appointment.  Patient discharged in stable condition accompanied by: self.  Departure Mode: Ambulatory with cane.      Jo Ann Angela RN

## 2023-09-15 NOTE — PROGRESS NOTES
Infusion Nursing Note:  Bonny Raymundo presents today for C3D5 Vidaza.    Patient seen by provider today: No   present during visit today: Not Applicable.    Note: Bonny denied fevers, chills, cough, SOB, and chest pain. Her lightheaded/dizziness, fatigue, and SOB are about at her baseline. She is eating okay but feels it could be better. She reported drinking enough fluids.    Bonny denied any signs/symptoms of bleeding. Writer visualized blood on patient's gums. CBC checked to see if patient needed platelets today. Dr. Spann notified.    Per written communication with Dr. Spann/Ebony Delgado RN 09/15/23 @ 1117  - looks like platelets are 7k today, so please transfuse per blood orders    Patient stated that she would take zofran when she returned home from clinic today.    Intravenous Access:  Peripheral IV placed.    Treatment Conditions:  Lab Results   Component Value Date    HGB 9.0 (L) 09/15/2023    WBC 4.5 09/15/2023    ANEU 1.8 09/15/2023    ANEUTAUTO 1.3 (L) 09/12/2022    PLT 7 (LL) 09/15/2023        Results reviewed, labs MET treatment parameters, ok to proceed with treatment.  Blood transfusion consent signed 8/7/23.      Post Infusion Assessment:  Patient tolerated infusion without incident.  Patient tolerated vidaza injection to right abdomen without incident.  Blood return noted pre and post infusion.  Site patent and intact, free from redness, edema or discomfort.  No evidence of extravasations.  Access discontinued per protocol.       Discharge Plan:   Discharge instructions reviewed with: Patient.  Patient and/or family verbalized understanding of discharge instructions and all questions answered.  Copy of AVS reviewed with patient and/or family.  Patient will return 9/19 for next appointment.  Patient discharged in stable condition accompanied by: self.  Departure Mode: Ambulatory.      Graciela Delgado RN

## 2023-09-15 NOTE — PATIENT INSTRUCTIONS
[facility] Triage and after hours / weekends / holidays:  [phone] option 5, option 2    Please call the triage or after hours line if you experience a temperature greater than or equal to 100.4, shaking chills, have uncontrolled nausea, vomiting and/or diarrhea, dizziness, shortness of breath, chest pain, bleeding, unexplained bruising, or if you have any other new/concerning symptoms, questions or concerns.      If you are having any concerning symptoms or wish to speak to a provider before your next infusion visit, please call triage to notify your care team so we can adequately serve you.     If you need a refill on a narcotic prescription or other medication, please call before your infusion appointment.

## 2023-09-19 NOTE — PROGRESS NOTES
Infusion Nursing Note:  Bonny Raymundo presents today for 1 pk of plts, 1 unit of blood  Patient seen by provider today: No   present during visit today: Not Applicable.    Note: Patient presents to infusion feeling ok. Pt endorses continued fatigue and shortness of breath with no acute exacerbation. Patient denies acute discomfort and states no acute complaints or concerns needing to be addressed today. Specifically, pt denies s/s of infection such as fever, sore throat, cough, chest pain, shortness of breath, body aches, chills, headache, increased nasal congestion, or changes in taste/smell. Patient denies s/s of low hemoglobin such as increased dizziness from baseline, lightheadedness, increased fatigue from baseline, chest pain, increased shortness of breath from baseline, headache, appearing pale, or increased heart rate. Patient also denies s/s of low platelets such as increased bruising, frequent nose bleeds, bleeding gums, or blood in urine/stool. Patient aware to seek medical attention if the above symptoms develop at home. Pt wishes to receive 2 units of blood today and wants Hemoglobin to be 9.0 or above.     Dr. Spann made aware of pts request. Also inquired whether pt can receive plts today since she's just above the parameter.  TORB. 9/19/2023. 0957. Dr. Spann. Ed Joshi RN. Ok to give 1 pk of plts today. Give 1 unit of blood per blood treatment parameter.     Pt voices understanding that Dr. Spann wishes to keep transfusion orders as written.     Patient 97lbs today. Clarified with whether we are to infuse blood slower do to low body weight.  TORB. 9/19/2023. 1215. Dr. Spann. Ed Joshi RN. Infuse blood/plts at a rate decrease of 25%-50% of max rate for each.    25% of 270mls/hour max blood transfusion rate is 67.5mls/hour, therefore blood infused at 200mls/hour.     Intravenous Access:  Peripheral IV placed.    Treatment Conditions:  Lab Results   Component Value Date    HGB  8.1 (L) 09/19/2023    WBC 3.1 (L) 09/19/2023    ANEU 1.0 (L) 09/19/2023    ANEUTAUTO 1.3 (L) 09/12/2022    PLT 10 (LL) 09/19/2023        Results reviewed, labs MET treatment parameters, ok to proceed with treatment.  Blood transfusion consent signed 1/11/23.      Post Infusion Assessment:  Patient tolerated infusion without incident.  Blood return noted pre and post infusion.  Site patent and intact, free from redness, edema or discomfort.  No evidence of extravasations.  Access discontinued per protocol.       Discharge Plan:   Patient declined prescription refills.  Discharge instructions reviewed with: Patient.  Patient and/or family verbalized understanding of discharge instructions and all questions answered.  Copy of AVS reviewed with patient and/or family.  Patient will return 9/22 for next appointment.  Patient discharged in stable condition accompanied by: self.  Departure Mode: Ambulatory.      Ed Joshi RN

## 2023-09-19 NOTE — PATIENT INSTRUCTIONS
[facility] Triage and after hours / weekends / holidays:  [phone]    Please call the triage or after hours line if you experience a temperature greater than or equal to 100.4, shaking chills, have uncontrolled nausea, vomiting and/or diarrhea, dizziness, shortness of breath, chest pain, bleeding, unexplained bruising, or if you have any other new/concerning symptoms, questions or concerns.      If you are having any concerning symptoms or wish to speak to a provider before your next infusion visit, please call triage to notify them so we can adequately serve you.     If you need a refill on a narcotic prescription or other medication, please call before your infusion appointment.                 September 2023 Sunday Monday Tuesday Wednesday Thursday Friday Saturday                            1    LAB   9:40 AM   (20 min.)    LAB ONLY   [facility] 2       3     4    ONC INFUSION 3 HR (180 MIN)   9:00 AM   (180 min.)    ONC INFUSION NURSE   [facility] 5     6     7     8     9    ONC INFUSION 3 HR (180 MIN)   9:00 AM   (180 min.)    ONC INFUSION NURSE   [facility]   10     11    LAB PERIPHERAL   2:30 PM   (15 min.)   [facility] DRAW   [facility]    ONC INFUSION 1 HR (60 MIN)   3:00 PM   (60 min.)    ONC INFUSION NURSE   [facility] 12    ONC INFUSION 3 HR (180 MIN)   2:00 PM   (180 min.)    ONC INFUSION NURSE   [facility] 13    LAB PERIPHERAL   9:00 AM   (15 min.)   [facility] DRAW   [facility]    RETURN CCSL   9:15 AM   (30 min.)   Minerva Spann MD   [facility]    ONC INFUSION 1 HR (60 MIN)  10:30 AM   (60 min.)    ONC INFUSION NURSE   [facility] 14    INFUSION 1 HR (60 MIN)  11:00 AM   (60 min.)    CANCER INFUSION NURSE   M  [facility] 15    ONC INFUSION 1 HR (60 MIN)  10:30 AM   (60 min.)   UC ONC INFUSION NURSE   [facility] 16       17     18     19    LAB PERIPHERAL   7:45 AM   (15 min.)   [facility] DRAW   [facility]    ONC INFUSION 3 HR (180 MIN)   8:30 AM   (180 min.)   UC ONC INFUSION NURSE   [facility] 20     21     22    LAB PERIPHERAL  12:15 PM   (15 min.)   [facility] DRAW   [facility]    SPEC INFUSION 3 HR (180 MIN)   1:00 PM   (180 min.)   UC SIPC INFUSION NURSE   [facility] 23       24     25     26    LAB PERIPHERAL  10:15 AM   (15 min.)   [facility]RAW   [facility]    ONC INFUSION 3 HR (180 MIN)  12:00 PM   (180 min.)   UC ONC INFUSION NURSE   [facility] 27     28     29     30 October 2023 Sunday Monday Tuesday Wednesday Thursday Friday Saturday   1     2     3    LAB PERIPHERAL  11:00 AM   (15 min.)   [facility] DRAW   [facility]    ONC INFUSION 3 HR (180 MIN)   1:00 PM   (180 min.)   UC ONC INFUSION NURSE   [facility] 4     5     6     7       8     9     10     11     12    LAB PERIPHERAL  10:30 AM   (15 min.)   [facility]RAW   [facility]    RETURN CCSL  10:45 AM   (30 min.)   Minerva Spann MD   [facility]    ONC INFUSION 3 HR (180 MIN)   1:30 PM   (180 min.)   UC ONC INFUSION NURSE   [facility] 13     14       15     16     17    LAB PERIPHERAL  10:15 AM   (15 min.)   [facility]RAW   [facility]    ONC INFUSION 3 HR (180 MIN)  12:00 PM   (180 min.)   UC ONC INFUSION NURSE   [facility] 18     19     20     21       22      23     24    LAB PERIPHERAL  10:00 AM   (15 min.)   [facility]RAW   [facility]    ONC INFUSION 3 HR (180 MIN)  12:00 PM   (180 min.)    ONC INFUSION NURSE   [facility] 25     26     27     28       29     30     31    LAB PERIPHERAL  10:00 AM   (15 min.)   [facility]   [facility]    ONC INFUSION 3 HR (180 MIN)  12:00 PM   (180 min.)    ONC INFUSION NURSE   [facility]                                     Lab Results:  Recent Results (from the past 12 hour(s))   Comprehensive metabolic panel    Collection Time: 09/19/23  8:28 AM   Result Value Ref Range    Sodium 139 136 - 145 mmol/L    Potassium 4.2 3.4 - 5.3 mmol/L    Chloride 102 98 - 107 mmol/L    Carbon Dioxide (CO2) 28 22 - 29 mmol/L    Anion Gap 9 7 - 15 mmol/L    Urea Nitrogen 34.6 (H) 8.0 - 23.0 mg/dL    Creatinine 1.15 (H) 0.51 - 0.95 mg/dL    Calcium 9.6 8.8 - 10.2 mg/dL    Glucose 112 (H) 70 - 99 mg/dL    Alkaline Phosphatase 94 35 - 104 U/L    AST 10 0 - 45 U/L    ALT <5 0 - 50 U/L    Protein Total 7.2 6.4 - 8.3 g/dL    Albumin 3.0 (L) 3.5 - 5.2 g/dL    Bilirubin Total 0.4 <=1.2 mg/dL    GFR Estimate 48 (L) >60 mL/min/1.73m2   Adult Type and Screen    Collection Time: 09/19/23  8:28 AM   Result Value Ref Range    ABO/RH(D) A POS     Antibody Screen Negative Negative    SPECIMEN EXPIRATION DATE [id number]    CBC with platelets and differential    Collection Time: 09/19/23  8:28 AM   Result Value Ref Range    WBC Count 3.1 (L) 4.0 - 11.0 10e3/uL    RBC Count 2.72 (L) 3.80 - 5.20 10e6/uL    Hemoglobin 8.1 (L) 11.7 - 15.7 g/dL    Hematocrit 25.4 (L) 35.0 - 47.0 %    MCV 93 78 - 100 fL    MCH 29.8 26.5 - 33.0 pg    MCHC 31.9 31.5 - 36.5 g/dL    RDW 15.0 10.0 - 15.0 %    Platelet Count 10 (LL) 150 - 450 10e3/uL   Manual Differential    Collection Time: 09/19/23  8:28 AM   Result Value Ref Range    % Neutrophils 33 %    %  Lymphocytes 37 %    % Monocytes 26 %    % Eosinophils 0 %    % Basophils 0 %    % Blasts 4 %    Absolute Neutrophils 1.0 (L) 1.6 - 8.3 10e3/uL    Absolute Lymphocytes 1.1 0.8 - 5.3 10e3/uL    Absolute Monocytes 0.8 0.0 - 1.3 10e3/uL    Absolute Eosinophils 0.0 0.0 - 0.7 10e3/uL    Absolute Basophils 0.0 0.0 - 0.2 10e3/uL    Absolute Blasts 0.1 (H) <=0.0 10e3/uL    RBC Morphology Confirmed RBC Indices     Platelet Assessment  Automated Count Confirmed. Platelet morphology is normal.     Automated Count Confirmed. Platelet morphology is normal.

## 2023-09-22 NOTE — PATIENT INSTRUCTIONS
Dear Bonny Raymundo    Thank you for choosing [facility] ([facility]) for your transfusion.  The following information is a summary of our appointment as well as important reminders.        If you are a transplant patient and require transplant education, please click on this link: [email or web address].    We look forward in seeing you on your next appointment here at [facility]).  Please don t hesitate to call us at [phone] to reschedule any of your appointments or to speak with one of the [facility] registered nurses.  It was a pleasure taking care of you today.    Sincerely,    [facility]  [facility]  [address] [place] [address]  [address]  Phone:  [phone]

## 2023-09-22 NOTE — PROGRESS NOTES
Infusion Nursing Note:  Bonny Raymundo presents today for transfusions.    Patient seen by provider today: No   present during visit today: Not Applicable.    Note: Pt received 1u pRBCs and 1u plts.  No pre-meds ordered or indicated. Transfusion rate capped at 25% decrease of max allowed rate, so 200 ml/hr for RBCs and 225 ml/hr for platelets.  Blood consent last done 08/02/23.      Intravenous Access:  Labs drawn without difficulty.  Peripheral IV placed (both labs and PIV done by lab RN).    Treatment Conditions:  Lab Results   Component Value Date    HGB 8.0 (L) 09/22/2023    WBC 3.1 (L) 09/22/2023    ANEU 0.6 (L) 09/22/2023    ANEUTAUTO 1.3 (L) 09/12/2022    PLT 7 (LL) 09/22/2023        Results reviewed, labs MET treatment parameters, ok to proceed with treatment.  Blood transfusion consent signed 08/02/23.      Post Infusion Assessment:  Patient tolerated infusion without incident.  Blood return noted pre and post infusion.  Site patent and intact, free from redness, edema or discomfort.  No evidence of extravasations.  Access discontinued per protocol.       Discharge Plan:   Discharge instructions reviewed with: Patient.  Patient and/or family verbalized understanding of discharge instructions and all questions answered.  Copy of AVS reviewed with patient and/or family.  Patient will return 09/26 for next appointment.  AVS to patient via [employer].  Patient will return 09/26 for next appointment.   Patient discharged in stable condition accompanied by: self.  Departure Mode: Ambulatory.      Raiza Cerrato RN

## 2023-09-25 NOTE — TELEPHONE ENCOUNTER
DATE/TIME OF CALL RECEIVED FROM LAB:  09/25/23 at 3:53 PM   LAB TEST:  platelets 9   OTHER LAB VALUE:  Hgb 9.1 WBC 3.5 ANC 0.6   Therapy plan in place to transfuse if Platelets 10 or less, patient has appointment for transfusion tomorrow at 12:00 pm   PROVIDER NOTIFIED?: Yes  PROVIDER NAME: DR Spann   DATE/TIME LAB VALUE REPORTED TO PROVIDER: 3:56 paged Dr Spann   2nd page to Dr Spann at 4:25   4:40 Epic secure chat sent to Dr Spann and Sue Marquez [facility]   MECHANISM OF PROVIDER NOTIFICATION: Page  PROVIDER RESPONSE: Per Dr Spann Please call patient to make sure she is not having any serious bleeding and that she knows about her appointment tomorrow.   4:46 call placed to Bonny left message that she should plan to come to her transfusion appointment tomorrow at noon. Also advised that if she is having any bleeding that will not stop she should go to the ER due to her platelets being low.

## 2023-09-26 NOTE — PROGRESS NOTES
"Infusion Nursing Note:  Bonny Raymundo presents today for 1 unit Platelets.    Patient seen by provider today: No   present during visit today: Not Applicable.    Note: Patient denies the following: fevers, body aches, chills, headaches, vision changes, chest pain, nausea, vomiting, abdominal pain, rashes, or mouth sores .    -pt went to the dentist yesterday and had some bleeding  -encouraged pt to tell dentist about her MDS (pt does not think he knows but will discuss with him)  -may need a tooth pulled but \"will not have procedure for awhile\"  -will communicate with care team about future dental appts  -baseline fatigue and OCASIO  -intermittent dizziness when changing positions, not new  -receiving meals from [facility] and believes her food intake has increased  -aware she is not drinking enough fluid and will try to push fluids at home  -constipation has resolved  -no other s/s of bleeding, bruise from hitting left arm under bed is healing  -type and screen sent today for possible blood on Friday 9/29        Intravenous Access:  Peripheral IV placed.    Treatment Conditions:  Blood transfusion consent signed 1/11/23.   Latest Reference Range & Units 09/25/23 15:40   Sodium 136 - 145 mmol/L 138   Potassium 3.4 - 5.3 mmol/L 4.3   Chloride 98 - 107 mmol/L 102   Carbon Dioxide (CO2) 22 - 29 mmol/L 28   Urea Nitrogen 8.0 - 23.0 mg/dL 31.1 (H)   Creatinine 0.51 - 0.95 mg/dL 1.07 (H)   GFR Estimate >60 mL/min/1.73m2 53 (L)   Calcium 8.8 - 10.2 mg/dL 9.9   Anion Gap 7 - 15 mmol/L 8   Albumin 3.5 - 5.2 g/dL 3.4 (L)   Protein Total 6.4 - 8.3 g/dL 7.6   Alkaline Phosphatase 35 - 104 U/L 88   ALT 0 - 50 U/L <5   AST 0 - 45 U/L 17   Bilirubin Total <=1.2 mg/dL 0.7   Glucose 70 - 99 mg/dL 108 (H)   WBC 4.0 - 11.0 10e3/uL 3.5 (L)   Hemoglobin 11.7 - 15.7 g/dL 9.1 (L)   Hematocrit 35.0 - 47.0 % 28.3 (L)   Platelet Count 150 - 450 10e3/uL 7 (LL)   RBC Count 3.80 - 5.20 10e6/uL 3.17 (L)   MCV 78 - 100 fL 89   MCH 26.5 - " 33.0 pg 28.7   MCHC 31.5 - 36.5 g/dL 32.2   RDW 10.0 - 15.0 % 16.3 (H)   % Neutrophils % 25   % Lymphocytes % 42   % Monocytes % 30   % Eosinophils % 0   % Basophils % 0   % Blasts % 3   Absolute Basophils 0.0 - 0.2 10e3/uL 0.0   Absolute Neutrophil 1.6 - 8.3 10e3/uL 0.9 (L)   Absolute Lymphocytes 0.8 - 5.3 10e3/uL 1.5   Absolute Monocytes 0.0 - 1.3 10e3/uL 1.1   Absolute Eosinophils 0.0 - 0.7 10e3/uL 0.0   Absolute Blasts <=0.0 10e3/uL 0.1 (H)   RBC Morphology  Confirmed RBC Indices   Platelet Morphology Automated Count Confirmed. Platelet morphology is normal.  Automated Count Confirmed. Platelet morphology is normal.   Target Cells None Seen  Slight !   ABO/Rh(D)  A POS   Antibody Screen Negative  Negative   SPECIMEN EXPIRATION DATE  [id number]       Post Infusion Assessment:  Patient tolerated infusion without incident.  Blood return noted pre and post infusion.  Site patent and intact, free from redness, edema or discomfort.  No evidence of extravasations.  Access discontinued per protocol.       Discharge Plan:   Patient declined prescription refills.  Discharge instructions reviewed with: Patient.  Patient and/or family verbalized understanding of discharge instructions and all questions answered.  AVS to patient via [employer].  Patient is on the [facility] for possible blood products for 9/29.  Patient discharged in stable condition accompanied by: self.  Departure Mode: Ambulatory.    Shaunna Parson RN

## 2023-09-28 NOTE — TELEPHONE ENCOUNTER
DATE/TIME OF CALL RECEIVED FROM LAB:  09/28/23 at 4:14 PM     LAB TEST & LAB VALUE:  Critical  Hgb 7.7  Plts 5    Previous Labs from 9/25/23 Hgb 9.1 Plts7    PROVIDER NOTIFIED?: Yes    PROVIDER NAME: Dr. Spann    TIME LAB VALUE REPORTED TO PROVIDER:   1617    MECHANISM OF PROVIDER NOTIFICATION: Page    PROVIDER RESPONSE:   1618 Per Dr. Spann, as long as no s/s of active bleeding, okay to wait for transfusion tomorrow at 7:00am.     1621 this writer made 2 attempts to call pt and left message for pt to call triage nurse back to assess condition.

## 2023-09-29 NOTE — LETTER
9/29/2023         RE: Bonny Raymundo  [address]  [facility] [id number]        Dear Colleague,    Thank you for referring your patient, Bonny Raymundo, to the [facility]. Please see a copy of my visit note below.    Oncology/Hematology Visit Note  Sep 29, 2023    History of Present Illness: Bonny Raymundo is a 77 year old female with MDS. He/She is currently undergoing treatment with Vidaza. She was seen in infusion today as an acute add-on for blood transfusion related complications.     Please see previous notes for further details on the patient's history.     Interval History:  At the end of platelet transfusion today, Bonny developed rigors. Was also noted to have elevated blood pressure and hypoxia. Please see nursing note for full details of transfusion event.    Upon assessment of patient in infusion, she denies feeling dizzy or lightheaded. No chills or rigors. Denies shortness of breath, chest pain/pressure or difficulty breathing. No changes with bowel or bladder function. Reports pain in her right knee, but states this is chronic and unchanged today.     Review of Systems:  Patient denies any of the following except if noted above: fevers, chills, difficulty with energy, vision or hearing changes, chest pain, dyspnea, abdominal pain, nausea, vomiting, diarrhea, constipation, urinary concerns, headaches, numbness, tingling, issues with sleep or mood. She also denies lumps, bumps, rashes or skin lesions, bleeding or bruising issues.    Current Outpatient Medications   Medication Sig Dispense Refill    acetaminophen (TYLENOL) 650 MG CR tablet Take 650 mg by mouth every 8 hours as needed for mild pain or fever      acyclovir (ZOVIRAX) 400 MG tablet Take 1 tablet (400 mg) by mouth 2 times daily 60 tablet 3    CALCIUM PO Take 1 tablet by mouth daily      diphenhydrAMINE (BENADRYL) 25 MG tablet Take 25 mg by mouth nightly as needed for sleep (Patient not taking:  Reported on 9/12/2023) 56 tablet     levofloxacin (LEVAQUIN) 250 MG tablet Take 1 tablet (250 mg) by mouth daily 30 tablet 3    multivitamin w/minerals (THERA-VIT-M) tablet Take 1 tablet by mouth daily      ondansetron (ZOFRAN) 8 MG tablet Take 1 tab (8 mg) by mouth prior to azacitidine, then 1 tab every 8 hours as needed for nausea. 30 tablet 5    pantoprazole (PROTONIX) 20 MG EC tablet Take 1 tablet (20 mg) by mouth daily 30 tablet 4    UNABLE TO FIND Take by mouth daily MEDICATION NAME: Vitamin K         Physical Examination:  General: The patient is a pleasant female in no acute distress.   9/29/2023  11:12 AM 9/29/2023  11:27 AM 9/29/2023  11:28 AM   Vital Signs      Systolic 80 (L)   83 (L)    Diastolic 44 !   52 !    Pulse 116   104    Temperature 97.9  F (36.6  C)   97.9  F (36.6  C)    Respirations 18   18    Weight (LB)      Height      BMI (Calculated)      Pain Score      O2 91 %  88 % (L)  92 %    Weight (Patient Reported)      Height (Patient Reported)      BMI (Based on Pt Reported Ht/Wt)         9/29/2023  12:12 PM 9/29/2023  12:31 PM 9/29/2023  12:55 PM   Vital Signs      Systolic 108  114  110    Diastolic 69  70  68    Pulse  100  96    Temperature  98  F (36.7  C)  98.4  F (36.9  C)    Respirations  18  18    Weight (LB)      Height      BMI (Calculated)      Pain Score      O2 98 %  93 %  94 %    Weight (Patient Reported)      Height (Patient Reported)      BMI (Based on Pt Reported Ht/Wt)         Wt Readings from Last 10 Encounters:   09/29/23 43.3 kg (95 lb 6.4 oz)   09/13/23 44.3 kg (97 lb 11.2 oz)   09/11/23 44.5 kg (98 lb)   08/22/23 44.8 kg (98 lb 11.2 oz)   08/16/23 45.4 kg (100 lb)   08/15/23 45.7 kg (100 lb 12.8 oz)   08/08/23 45.4 kg (100 lb 1.6 oz)   08/07/23 44 kg (96 lb 14.4 oz)   08/04/23 45 kg (99 lb 3.2 oz)   08/02/23 44.9 kg (99 lb)     HEENT: EOMI, PERRL. Sclerae are anicteric. Oral mucosa is pink and moist with no lesions or thrush.   Lymph: Neck is supple with no  lymphadenopathy in the cervical or supraclavicular areas.   Heart: Regular rate and rhythm.   Lungs: Clear to auscultation bilaterally.   Abdomen: Bowel sounds present, soft, nontender with no palpable hepatosplenomegaly or masses.   Extremities: No lower extremity edema noted bilaterally.   Neuro: Cranial nerves II through XII are grossly intact.  Skin: No rashes, petechiae, or bruising noted on exposed skin.    Laboratory Data:  Most Recent 3 CBC's:  Recent Labs   Lab Test 09/28/23  1538 09/25/23  1540 09/22/23  1243 10/05/22  1017 09/12/22  1443 07/08/22  1103 06/27/22  1312 06/16/22  1020 06/02/22  1741   WBC 3.4* 3.5* 3.1*   < > 3.1*   < > 2.6*   < > 2.2*   HGB 7.7* 9.1* 8.0*   < > 7.5*   < > 6.8*   < > 6.5*   MCV 91 89 88   < > 107*   < > 110*   < > 115*   PLT 6* 7* 7*   < > 80*   < > 111*   < > 121*   ANEUTAUTO  --   --   --   --  1.3*  --  1.0*  --  0.7*    < > = values in this interval not displayed.    Most Recent 3 BMP's:  Recent Labs   Lab Test 09/25/23  1540 09/22/23  1243 09/19/23  0828    139 139   POTASSIUM 4.3 4.2 4.2   CHLORIDE 102 103 102   CO2 28 27 28   BUN 31.1* 32.4* 34.6*   CR 1.07* 1.08* 1.15*   ANIONGAP 8 9 9   LEO 9.9 9.5 9.6   * 118* 112*   PROTTOTAL 7.6 7.3 7.2   ALBUMIN 3.4* 3.3* 3.0*    Most Recent 2 LFT's:  Recent Labs   Lab Test 09/25/23  1540 09/22/23  1243   AST 17 10   ALT <5 <5   ALKPHOS 88 89   BILITOTAL 0.7 0.4    Most Recent TSH and T4:  Recent Labs   Lab Test 07/26/22  1123   TSH 1.97     I reviewed the above labs today.    Imaging:  XR Surgery ZEFERINO L/T 5 Min Fluoro w Stills  This exam was marked as non-reportable because it will not be read by a   radiologist or a [facility] non-radiologist provider.    I reviewed the above imaging today.    Assessment and Plan:    Complications with platelet transfusion.   Developed symptoms of hypoxia, hypotension, and rigors during platelet transfusion. Upon meeting the patient in infusion, symptoms had all resolved. Blood  pressure improved during PRBC transfusion. Initially patient was placed on oxygen via NC.  Oxygen saturations improved and patient was eventually weaned back to room air. During my assessment, Bonny denied any symptoms and requested to be discharged home. Discussed the importance of calling triage or presenting to the emergency department with symptoms were to return or any new concerns developed.   -Blood transfusion reaction workup was completed per protocol.   -RTC on 10/3 as scheduled for labs + possible transfusion. Follow up with Dr. Spann as scheduled 10/12. Patient to call sooner with questions or concerns.       20 minutes spent on the date of the encounter doing chart review, review of test results, patient visit, documentation, and discussion with other provider(s)     KHADRA Coffman CNP

## 2023-09-29 NOTE — PROGRESS NOTES
Oncology/Hematology Visit Note  Sep 29, 2023    History of Present Illness: Bonny Raymundo is a 77 year old female with MDS. He/She is currently undergoing treatment with Vidaza. She was seen in infusion today as an acute add-on for blood transfusion related complications.     Please see previous notes for further details on the patient's history.     Interval History:  At the end of platelet transfusion today, Bonny developed rigors. Was also noted to have elevated blood pressure and hypoxia. Please see nursing note for full details of transfusion event.    Upon assessment of patient in infusion, she denies feeling dizzy or lightheaded. No chills or rigors. Denies shortness of breath, chest pain/pressure or difficulty breathing. No changes with bowel or bladder function. Reports pain in her right knee, but states this is chronic and unchanged today.     Review of Systems:  Patient denies any of the following except if noted above: fevers, chills, difficulty with energy, vision or hearing changes, chest pain, dyspnea, abdominal pain, nausea, vomiting, diarrhea, constipation, urinary concerns, headaches, numbness, tingling, issues with sleep or mood. She also denies lumps, bumps, rashes or skin lesions, bleeding or bruising issues.    Current Outpatient Medications   Medication Sig Dispense Refill    acetaminophen (TYLENOL) 650 MG CR tablet Take 650 mg by mouth every 8 hours as needed for mild pain or fever      acyclovir (ZOVIRAX) 400 MG tablet Take 1 tablet (400 mg) by mouth 2 times daily 60 tablet 3    CALCIUM PO Take 1 tablet by mouth daily      diphenhydrAMINE (BENADRYL) 25 MG tablet Take 25 mg by mouth nightly as needed for sleep (Patient not taking: Reported on 9/12/2023) 56 tablet     levofloxacin (LEVAQUIN) 250 MG tablet Take 1 tablet (250 mg) by mouth daily 30 tablet 3    multivitamin w/minerals (THERA-VIT-M) tablet Take 1 tablet by mouth daily      ondansetron (ZOFRAN) 8 MG tablet Take 1 tab (8 mg) by mouth  prior to azacitidine, then 1 tab every 8 hours as needed for nausea. 30 tablet 5    pantoprazole (PROTONIX) 20 MG EC tablet Take 1 tablet (20 mg) by mouth daily 30 tablet 4    UNABLE TO FIND Take by mouth daily MEDICATION NAME: Vitamin K         Physical Examination:  General: The patient is a pleasant female in no acute distress.   9/29/2023  11:12 AM 9/29/2023  11:27 AM 9/29/2023  11:28 AM   Vital Signs      Systolic 80 (L)   83 (L)    Diastolic 44 !   52 !    Pulse 116   104    Temperature 97.9  F (36.6  C)   97.9  F (36.6  C)    Respirations 18   18    Weight (LB)      Height      BMI (Calculated)      Pain Score      O2 91 %  88 % (L)  92 %    Weight (Patient Reported)      Height (Patient Reported)      BMI (Based on Pt Reported Ht/Wt)         9/29/2023  12:12 PM 9/29/2023  12:31 PM 9/29/2023  12:55 PM   Vital Signs      Systolic 108  114  110    Diastolic 69  70  68    Pulse  100  96    Temperature  98  F (36.7  C)  98.4  F (36.9  C)    Respirations  18  18    Weight (LB)      Height      BMI (Calculated)      Pain Score      O2 98 %  93 %  94 %    Weight (Patient Reported)      Height (Patient Reported)      BMI (Based on Pt Reported Ht/Wt)         Wt Readings from Last 10 Encounters:   09/29/23 43.3 kg (95 lb 6.4 oz)   09/13/23 44.3 kg (97 lb 11.2 oz)   09/11/23 44.5 kg (98 lb)   08/22/23 44.8 kg (98 lb 11.2 oz)   08/16/23 45.4 kg (100 lb)   08/15/23 45.7 kg (100 lb 12.8 oz)   08/08/23 45.4 kg (100 lb 1.6 oz)   08/07/23 44 kg (96 lb 14.4 oz)   08/04/23 45 kg (99 lb 3.2 oz)   08/02/23 44.9 kg (99 lb)     HEENT: EOMI, PERRL. Sclerae are anicteric. Oral mucosa is pink and moist with no lesions or thrush.   Lymph: Neck is supple with no lymphadenopathy in the cervical or supraclavicular areas.   Heart: Regular rate and rhythm.   Lungs: Clear to auscultation bilaterally.   Abdomen: Bowel sounds present, soft, nontender with no palpable hepatosplenomegaly or masses.   Extremities: No lower extremity edema noted  bilaterally.   Neuro: Cranial nerves II through XII are grossly intact.  Skin: No rashes, petechiae, or bruising noted on exposed skin.    Laboratory Data:  Most Recent 3 CBC's:  Recent Labs   Lab Test 09/28/23  1538 09/25/23  1540 09/22/23  1243 10/05/22  1017 09/12/22  1443 07/08/22  1103 06/27/22  1312 06/16/22  1020 06/02/22  1741   WBC 3.4* 3.5* 3.1*   < > 3.1*   < > 2.6*   < > 2.2*   HGB 7.7* 9.1* 8.0*   < > 7.5*   < > 6.8*   < > 6.5*   MCV 91 89 88   < > 107*   < > 110*   < > 115*   PLT 6* 7* 7*   < > 80*   < > 111*   < > 121*   ANEUTAUTO  --   --   --   --  1.3*  --  1.0*  --  0.7*    < > = values in this interval not displayed.    Most Recent 3 BMP's:  Recent Labs   Lab Test 09/25/23  1540 09/22/23  1243 09/19/23  0828    139 139   POTASSIUM 4.3 4.2 4.2   CHLORIDE 102 103 102   CO2 28 27 28   BUN 31.1* 32.4* 34.6*   CR 1.07* 1.08* 1.15*   ANIONGAP 8 9 9   LEO 9.9 9.5 9.6   * 118* 112*   PROTTOTAL 7.6 7.3 7.2   ALBUMIN 3.4* 3.3* 3.0*    Most Recent 2 LFT's:  Recent Labs   Lab Test 09/25/23  1540 09/22/23  1243   AST 17 10   ALT <5 <5   ALKPHOS 88 89   BILITOTAL 0.7 0.4    Most Recent TSH and T4:  Recent Labs   Lab Test 07/26/22  1123   TSH 1.97     I reviewed the above labs today.    Imaging:  XR Surgery ZEFERINO L/T 5 Min Fluoro w Stills  This exam was marked as non-reportable because it will not be read by a   radiologist or a [facility] non-radiologist provider.    I reviewed the above imaging today.    Assessment and Plan:    Complications with platelet transfusion.   Developed symptoms of hypoxia, hypotension, and rigors during platelet transfusion. Upon meeting the patient in infusion, symptoms had all resolved. Blood pressure improved during PRBC transfusion. Initially patient was placed on oxygen via NC.  Oxygen saturations improved and patient was eventually weaned back to room air. During my assessment, Bonny denied any symptoms and requested to be discharged home. Discussed the importance of  calling triage or presenting to the emergency department with symptoms were to return or any new concerns developed.   -Blood transfusion reaction workup was completed per protocol.   -RTC on 10/3 as scheduled for labs + possible transfusion. Follow up with Dr. Spann as scheduled 10/12. Patient to call sooner with questions or concerns.       20 minutes spent on the date of the encounter doing chart review, review of test results, patient visit, documentation, and discussion with other provider(s)     KHADRA Coffman CNP

## 2023-09-29 NOTE — PATIENT INSTRUCTIONS
[facility] Triage and after hours / weekends / holidays:  [phone]    Please call the triage or after hours line if you experience a temperature greater than or equal to 100.4, shaking chills, have uncontrolled nausea, vomiting and/or diarrhea, dizziness, shortness of breath, chest pain, bleeding, unexplained bruising, or if you have any other new/concerning symptoms, questions or concerns.      If you are having any concerning symptoms or wish to speak to a provider before your next infusion visit, please call your care coordinator or triage to notify them so we can adequately serve you.     If you need a refill on a narcotic prescription or other medication, please call before your infusion appointment.

## 2023-09-29 NOTE — PROGRESS NOTES
Infusion Nursing Note:  Bonny Raymundo presents today for 1 unit platelets, 1 unit PRBCs.    Patient seen by provider today: No   present during visit today: Not Applicable.    Note: Bonny presents today feeling tired. Endorses ongoing R knee pain related to arthritis; pillow support provided for comfort. Denies nausea/vomiting. Denies fevers/chills. Endorses unchanged OCASIO with walking long distances. Denies cough, chest pain, or dizziness/lightheadedness. Denies constipation/diarrhea. Denies urinary issues. Denies neuropathy. Offers no new concerns at this appointment.    Platelets transfused at 225 ml/hr, PRBCs transfused at 200 ml/hr per TORB from Dr. Spann on 09/19 to infuse 25% slower than maximum rate per blood transfusion policy.    At the end of platelet transfusion, Bonny was noted to have shaking chills. She reported no SOB, itching, or other signs of allergic reaction. Blankets applied and heat pack given for her hands. Chills resolved after ~10 minutes. Unable to obtain blood pressure during this time due to chills, but BP was elevated from baseline once read was obtained. Pulse ox read O2 sats 81-82% on RA. Moved pulse ox to multiple fingers on either hand, tried another pulse ox device, but all reads were the same. Applied oxygen. O2 improved to 94% on 4 LPM O2 within 10 minutes. Slowly weaned O2 down with maintenance of sats >90% until about 10 minutes on 1 LPM O2 (decreased to 88%). Oxygen increased to 2 LPM with maintenance at 92%.    [facility] Dr. Spann/Tracy Busch, RN [phone]  Follow instruction of blood bank fellow MD for continuation of transfusions today  Monitor closely throughout PRBC transfusion  If Bonny is back to baseline O2 sats and no other symptoms, may discharge home  If new symptoms present or O2 does not return to baseline, Bonny should go to ER    [facility] Blood Bank Fellow MD/Tracy Busch, RN [phone]  Do full transfusion workup on platelets (done by writer)  Will need to wait to infuse  PRBCs today until TICO test is resulted    Per Jaqui in blood bank, TICO test negative, ok to proceed with PRBC transfusion. (~11:05 AM)    Prior to starting PRBC transfusion, Bonny got up to the restroom and on her way there, stated she felt very dizzy and appeared unsteady. RN assisted in restroom and Bonny was unable to stand unassisted. She reported feeling very dizzy and short of breath. She was assisted to sitting by 2 RNs. Utilized wheelchair to return to infusion bay. O2 sats 91% on RA. BP 80s/40s. Started PRBC transfusion. Required supplemental O2 again at the 10-minute vital signs check and BP remained low. After 30 minutes, blood pressure and O2 improved to Bonny's baseline. By the end of the transfusion, Bonny's blood pressure and O2 sats stabilized on RA. She denied feeling dizzy or lightheaded. Her only complaint was the pain in her knee related to arthritis. Erin Champion CNP, came to infusion to assess appropriateness for home discharge - ok to discharge home. Educated Bonny to contact clinic triage or report to ED if symptoms of dizziness/lightheadedness, shortness of breath, fevers/chills, or itching occur. She expressed understanding.      Intravenous Access:  Peripheral IV placed.    Treatment Conditions:     Latest Reference Range & Units 09/28/23 15:38   WBC 4.0 - 11.0 10e3/uL 3.4 (L)   Hemoglobin 11.7 - 15.7 g/dL 7.7 (L)   Hematocrit 35.0 - 47.0 % 24.4 (L)   Platelet Count 150 - 450 10e3/uL 6 (LL)   RBC Count 3.80 - 5.20 10e6/uL 2.69 (L)   MCV 78 - 100 fL 91   MCH 26.5 - 33.0 pg 28.6   MCHC 31.5 - 36.5 g/dL 31.6   RDW 10.0 - 15.0 % 16.7 (H)   % Neutrophils % 35   % Lymphocytes % 33   % Monocytes % 27   % Eosinophils % 0   % Basophils % 0   % Blasts % 5   Absolute Basophils 0.0 - 0.2 10e3/uL 0.0   NRBC/W <=0 % 1 (H)   Absolute Neutrophil 1.6 - 8.3 10e3/uL 1.2 (L)   Absolute Lymphocytes 0.8 - 5.3 10e3/uL 1.1   Absolute Monocytes 0.0 - 1.3 10e3/uL 0.9   Absolute Eosinophils 0.0 - 0.7 10e3/uL 0.0    Absolute Blasts <=0.0 10e3/uL 0.2 (H)   Absolute NRBCs <=0.0 10e3/uL 0.0   RBC Morphology  Confirmed RBC Indices   Platelet Morphology Automated Count Confirmed. Platelet morphology is normal.  Automated Count Confirmed. Platelet morphology is normal.   RBC Fragments None Seen  Slight !   Target Cells None Seen  Slight !   Quang Cells None Seen  Slight !   Smudge Cells None Seen  Present !   ABO/Rh(D)  A POS   Antibody Screen Negative  Negative   SPECIMEN EXPIRATION DATE  [id number]     Results reviewed, labs MET treatment parameters, ok to proceed with treatment.  Blood transfusion consent signed 01/11/23.      Post Infusion Assessment:  Patient tolerated infusion without incident.  Blood return noted pre and post infusion.  Site patent and intact, free from redness, edema or discomfort.  No evidence of extravasations.  Access discontinued per protocol.       Discharge Plan:   Patient declined prescription refills.  Discharge instructions reviewed with: Patient.  Patient and/or family verbalized understanding of discharge instructions and all questions answered.  AVS to patient via [employer].  Patient will return 10/03 for next infusion appointment.   Patient discharged in stable condition accompanied by: self.  Departure Mode: Ambulatory.      Tracy Busch RN

## 2023-10-03 NOTE — NURSING NOTE
Chief Complaint   Patient presents with    Blood Draw     Labs drawn from PIV placed by RN. Line flushed with saline. Vitals taken. Pt checked in for appointment(s).      Santa Pool RN

## 2023-10-03 NOTE — PROGRESS NOTES
Infusion Nursing Note:  Bonny Raymundo presents today for 1 unit RBC-1 unit Platelets.    Patient seen by provider today: No   present during visit today: Not Applicable.    Note: Patient denies the following: fevers, body aches, chills, headaches, vision changes, dizziness, chest pain, shortness of breath, nausea, vomiting, constipation, abdominal pain, rashes, bruising/bleeding, mouth sores,     -denies any unusual bleeding  -pain in right knee continues  -declines intervention  -creat elevated today, H/O CKD      Intravenous Access:  Peripheral IV placed.    Treatment Conditions:  Blood transfusion consent signed 1/11/23.   Latest Reference Range & Units 10/03/23 11:51   Sodium 135 - 145 mmol/L 141   Potassium 3.4 - 5.3 mmol/L 4.6   Chloride 98 - 107 mmol/L 105   Carbon Dioxide (CO2) 22 - 29 mmol/L 29   Urea Nitrogen 8.0 - 23.0 mg/dL 35.0 (H)   Creatinine 0.51 - 0.95 mg/dL 1.20 (H)   GFR Estimate >60 mL/min/1.73m2 46 (L)   Calcium 8.8 - 10.2 mg/dL 9.6   Anion Gap 7 - 15 mmol/L 7   Albumin 3.5 - 5.2 g/dL 3.4 (L)   Protein Total 6.4 - 8.3 g/dL 7.3   Alkaline Phosphatase 35 - 104 U/L 72   ALT 0 - 50 U/L <5   AST 0 - 45 U/L 8   Bilirubin Total <=1.2 mg/dL 0.6   Glucose 70 - 99 mg/dL 118 (H)   WBC 4.0 - 11.0 10e3/uL 2.4 (L)   Hemoglobin 11.7 - 15.7 g/dL 8.0 (L)   Hematocrit 35.0 - 47.0 % 25.0 (L)   Platelet Count 150 - 450 10e3/uL 7 (LL)   RBC Count 3.80 - 5.20 10e6/uL 2.76 (L)   MCV 78 - 100 fL 91   MCH 26.5 - 33.0 pg 29.0   MCHC 31.5 - 36.5 g/dL 32.0   RDW 10.0 - 15.0 % 16.8 (H)   % Neutrophils % 27   % Lymphocytes % 42   % Monocytes % 31   % Eosinophils % 0   % Basophils % 0   Absolute Basophils 0.0 - 0.2 10e3/uL 0.0   NRBC/W <=0 % 1 (H)   Absolute Neutrophil 1.6 - 8.3 10e3/uL 0.6 (L)   Absolute Lymphocytes 0.8 - 5.3 10e3/uL 1.0   Absolute Monocytes 0.0 - 1.3 10e3/uL 0.7   Absolute Eosinophils 0.0 - 0.7 10e3/uL 0.0   Absolute NRBCs 10e3/uL 0.0   Absolute NRBCs <=0.0 10e3/uL 0.0   NRBCs per 100 WBC <1  /100 0   RBC Morphology  Confirmed RBC Indices   Platelet Morphology Automated Count Confirmed. Platelet morphology is normal.  Automated Count Confirmed. Platelet morphology is normal.   ABO/Rh(D)  A POS   Antibody Screen Negative  Negative   SPECIMEN EXPIRATION DATE  [id number]   (    Post Infusion Assessment:  Patient tolerated infusion without incident.  Blood return noted pre and post infusion.  Site patent and intact, free from redness, edema or discomfort.  No evidence of extravasations.  Access discontinued per protocol.       Discharge Plan:   Patient declined prescription refills.  Discharge instructions reviewed with: Patient.  Patient and/or family verbalized understanding of discharge instructions and all questions answered.  AVS to patient via [employer].  On WL for 8/6.   Patient discharged in stable condition accompanied by: self.  Departure Mode: Ambulatory.    Shaunna Parson RN

## 2023-10-06 NOTE — PROGRESS NOTES
Infusion Nursing Note:  Bonny Raymundo presents today for platelets.    Patient seen by provider today: No   present during visit today: Not Applicable.    Note: Patient denies any fevers or chills at home. Denies any bleeding. No cough, sob is at baseline.  Intermittent dizziness at baseline. Denies any nausea or vomiting. States she eating ok, needs to work on drinking more fluids.      Has arthritis pain in her right knee today, taking tylenol as needed.  Positioned for comfort while in infusion with multiple pillows. Denies the need for any other interventions for pain while in infusion today.       Intravenous Access:  Peripheral IV placed.    Treatment Conditions:  Lab Results   Component Value Date    HGB 8.9 (L) 10/06/2023    WBC 2.7 (L) 10/06/2023    ANEU 1.2 (L) 10/06/2023    ANEUTAUTO 1.3 (L) 09/12/2022    PLT 10 (LL) 10/06/2023        Results reviewed, labs MET treatment parameters, ok to proceed with treatment.  Blood transfusion consent signed 1/11/23.      Post Infusion Assessment:  Patient tolerated infusion without incident.  Blood return noted pre and post infusion.  Site patent and intact, free from redness, edema or discomfort.  No evidence of extravasations.  Access discontinued per protocol.       Discharge Plan:   Patient declined prescription refills.  Discharge instructions reviewed with: Patient.  Patient and/or family verbalized understanding of discharge instructions and all questions answered.  AVS to patient via [employer].  Patient will return 10/9/23 for next appointment.   Patient discharged in stable condition accompanied by: self.  Departure Mode: Ambulatory.      Roxi Chávez RN

## 2023-10-06 NOTE — PATIENT INSTRUCTIONS
[facility] Triage and after hours / weekends / holidays:  [phone]    Please call the triage or after hours line if you experience a temperature greater than or equal to 100.4, shaking chills, have uncontrolled nausea, vomiting and/or diarrhea, dizziness, shortness of breath, chest pain, bleeding, unexplained bruising, or if you have any other new/concerning symptoms, questions or concerns.      If you are having any concerning symptoms or wish to speak to a provider before your next infusion visit, please call triage to notify them so we can adequately serve you.     If you need a refill on a narcotic prescription or other medication, please call before your infusion appointment.             October 2023 Sunday Monday Tuesday Wednesday Thursday Friday Saturday   1     2     3    LAB PERIPHERAL  11:00 AM   (15 min.)    [facility]   [facility]    ONC INFUSION 3 HR (180 MIN)   1:00 PM   (180 min.)   [facility] INFUSION NURSE   [facility] 4     5     6    LAB PERIPHERAL   8:15 AM   (15 min.)   [facility]   [facility]    ONC INFUSION 3 HR (180 MIN)   9:00 AM   (180 min.)   [facility] ONC INFUSION NURSE   [facility] 7       8     9    LAB PERIPHERAL   9:45 AM   (15 min.)   [facility]   [facility]    ONC INFUSION 3 HR (180 MIN)  10:30 AM   (180 min.)   [facility] ONC INFUSION NURSE   [facility] 10     11     12    LAB PERIPHERAL  10:30 AM   (15 min.)    [facility]   [facility]    RETURN CCSL  10:45 AM   (30 min.)   Minerva Spann MD   [facility]    ONC INFUSION 3 HR (180 MIN)   1:30 PM   (180 min.)   [facility] ON[facility] INFUSION NURSE   [facility] 13     14       15     16     17    LAB PERIPHERAL  10:15 AM   (15 min.)   [facility]   [facility]  [facility]    ONC INFUSION 3 HR (180 MIN)  12:00 PM   (180 min.)   [facility] ONC INFUSION NURSE   [facility] 18     19     20     21       22     23     24    LAB PERIPHERAL  10:00 AM   (15 min.)   [facility]   [facility]    ONC INFUSION 3 HR (180 MIN)  12:00 PM   (180 min.)   [facility] ONC INFUSION NURSE   [facility] 25     26     27     28       29     30     31    LAB PERIPHERAL  10:00 AM   (15 min.)   [facility]   [facility]    ONC INFUSION 3 HR (180 MIN)  12:00 PM   (180 min.)   [facility] ON[facility] INFUSION NURSE   [facility]                                 November 2023 Sunday Monday Tuesday Wednesday Thursday Friday Saturday                  1     2     3     4       5     6     7    LAB PERIPHERAL  10:00 AM   (15 min.)   [facility]   [facility]    ONC INFUSION 3 HR (180 MIN)  12:00 PM   (180 min.)   [facility] ONC INFUSION NURSE   [facility] 8     9     10     11       12     13     14    LAB PERIPHERAL  10:00 AM   (15 min.)   [facility]   [facility]    ONC INFUSION 3 HR (180 MIN)  12:00 PM   (180 min.)   [facility] ONC INFUSION NURSE   [facility] 15     16     17     18       19     20     21    LAB PERIPHERAL  10:00 AM   (15 min.)   [facility]   [facility]    ONC INFUSION 3 HR (180 MIN)  12:00 PM   (180 min.)   [facility] ONC INFUSION NURSE   [facility] 22     23     24     25       26     27     28    LAB PERIPHERAL  10:00 AM   (15 min.)   [facility]   [facility]    ONC INFUSION 3 HR (180 MIN)  12:00 PM   (180 min.)   [facility] ONC INFUSION NURSE   [facility] 29     30                              Recent Results  (from the past 24 hour(s))   Prepare red blood cells (unit)    Collection Time: 10/05/23  2:27 PM   Result Value Ref Range    Blood Component Type Red Blood Cells     Product Code H2138M53     Unit Status Ready for issue     Unit Number [id number]     CROSSMATCH Compatible     CODING SYSTEM [id number]    Prepare red blood cells (unit)    Collection Time: 10/05/23  2:27 PM   Result Value Ref Range    Blood Component Type Red Blood Cells     Product Code C5875Q05     Unit Status Ready for issue     Unit Number [id number]     CROSSMATCH Compatible     CODING SYSTEM [id number]    Prepare pheresed platelets (unit)    Collection Time: 10/05/23  2:27 PM   Result Value Ref Range    Blood Component Type Platelets     Product Code I8110P54     Unit Status Ready for issue     Unit Number [id number]     CODING SYSTEM WZVY183    Prepare pheresed platelets (unit)    Collection Time: 10/05/23  2:27 PM   Result Value Ref Range    ISSUE DATE AND TIME [id number]     Blood Component Type Platelets     Product Code N8635S18     Unit Status Transfused     Unit Number [id number]     UNIT ABO/RH A+     CODING SYSTEM IWDP346     UNIT TYPE ISBT 6200    CBC with platelets and differential    Collection Time: 10/06/23  8:31 AM   Result Value Ref Range    WBC Count 2.7 (L) 4.0 - 11.0 10e3/uL    RBC Count 3.01 (L) 3.80 - 5.20 10e6/uL    Hemoglobin 8.9 (L) 11.7 - 15.7 g/dL    Hematocrit 27.2 (L) 35.0 - 47.0 %    MCV 90 78 - 100 fL    MCH 29.6 26.5 - 33.0 pg    MCHC 32.7 31.5 - 36.5 g/dL    RDW 16.5 (H) 10.0 - 15.0 %    Platelet Count 10 (LL) 150 - 450 10e3/uL    % Neutrophils      % Lymphocytes      % Monocytes      Mids % (Monos, Eos, Basos)      % Eosinophils      % Basophils      % Immature Granulocytes      NRBCs per 100 WBC 0 <1 /100    Absolute Neutrophils      Absolute Lymphocytes      Absolute Monocytes      Mids Abs (Monos, Eos, Basos)      Absolute Eosinophils      Absolute Basophils      Absolute Immature Granulocytes       Absolute NRBCs 0.0 10e3/uL   Manual Differential    Collection Time: 10/06/23  8:31 AM   Result Value Ref Range    % Neutrophils 45 %    % Lymphocytes 31 %    % Monocytes 24 %    % Eosinophils 0 %    % Basophils 0 %    Absolute Neutrophils 1.2 (L) 1.6 - 8.3 10e3/uL    Absolute Lymphocytes 0.8 0.8 - 5.3 10e3/uL    Absolute Monocytes 0.6 0.0 - 1.3 10e3/uL    Absolute Eosinophils 0.0 0.0 - 0.7 10e3/uL    Absolute Basophils 0.0 0.0 - 0.2 10e3/uL    RBC Morphology Confirmed RBC Indices     Platelet Assessment  Automated Count Confirmed. Platelet morphology is normal.     Automated Count Confirmed. Platelet morphology is normal.

## 2023-10-06 NOTE — NURSING NOTE
Chief Complaint   Patient presents with    Blood Draw     Labs drawn via PIV by RN in lab. VS taken.      Labs drawn via peripheral IV. Vital signs taken. Checked into next appointment.   Lana Griggs RN

## 2023-10-09 NOTE — PROGRESS NOTES
Infusion Nursing Note:  Bonny Raymundo presents today for 1 unit PRBCs.    Patient seen by provider today: No   present during visit today: Not Applicable.    Note: Bonny presents to infusion today feeling well. She states she is feeling more fatigued and is still having some OCASIO. She is having her normal arthritic pain in her knee. She denies any bleeding, infectious symptoms, or other concerns. Patient requesting tylenol prior to blood transfusion but declines benadryl.     Intravenous Access:  Peripheral IV placed.    Treatment Conditions:  Lab Results   Component Value Date    HGB 7.8 (L) 10/09/2023    WBC 2.2 (L) 10/09/2023    ANEU 1.2 (L) 10/06/2023    ANEUTAUTO 1.3 (L) 09/12/2022    PLT 16 (LL) 10/09/2023     Lab Results   Component Value Date     10/09/2023    POTASSIUM 4.2 10/09/2023    MAG 2.0 08/11/2023    CR 1.04 (H) 10/09/2023    LEO 9.5 10/09/2023    BILITOTAL 0.5 10/09/2023    ALBUMIN 3.3 (L) 10/09/2023    ALT <5 10/09/2023    AST 9 10/09/2023     Results reviewed, labs MET treatment parameters, ok to proceed with treatment.  Blood transfusion consent signed 1/11/23.    Post Infusion Assessment:  Patient tolerated infusion without incident.  Blood return noted pre and post infusion.  Site patent and intact, free from redness, edema or discomfort.  No evidence of extravasations.  Access discontinued per protocol.     Discharge Plan:   Patient declined prescription refills.  Discharge instructions reviewed with: Patient.  Patient and/or family verbalized understanding of discharge instructions and all questions answered.  AVS to patient via [facility].  Patient will return 10/12 for next appointment.   Patient discharged in stable condition accompanied by: self.  Departure Mode: Ambulatory.      Marely Sales RN

## 2023-10-09 NOTE — NURSING NOTE
Chief Complaint   Patient presents with    Blood Draw     Vitals, blood drawn and PIV placed by LPN. Pt checked into appt.      LEONEL Delcid LPN

## 2023-10-12 NOTE — PROGRESS NOTES
"Infusion Nursing Note:  Bonny Raymundo presents today for 1 unit of platelets & 1 unit of PRBCs.    Patient seen by provider today: Yes: Dr. Spann   present during visit today: Not Applicable.     Note: Bonny presents to infusion today following her clinic appointment. She denies any new concerns. She states that her previous reaction to blood products \"wasn't really a reaction\" and refused benadryl as a pre-med but took tylenol.    Intravenous Access:  Peripheral IV placed.    Treatment Conditions:  Lab Results   Component Value Date    HGB 8.3 (L) 10/12/2023    WBC 1.7 (L) 10/12/2023    ANEU 0.5 (L) 10/12/2023    ANEUTAUTO 1.3 (L) 09/12/2022    PLT 8 (LL) 10/12/2023     Lab Results   Component Value Date     10/12/2023    POTASSIUM 4.1 10/12/2023    MAG 2.0 08/11/2023    CR 1.02 (H) 10/12/2023    LEO 9.4 10/12/2023    BILITOTAL 0.6 10/12/2023    ALBUMIN 3.3 (L) 10/12/2023    ALT <5 10/12/2023    AST 7 10/12/2023     Results reviewed, labs MET treatment parameters, ok to proceed with treatment.  Blood transfusion consent signed 1/11/23.    Post Infusion Assessment:  Patient tolerated infusion without incident.  Blood return noted pre and post infusion.  Site patent and intact, free from redness, edema or discomfort.  No evidence of extravasations.  Access discontinued per protocol.     Discharge Plan:   Patient declined prescription refills.  Discharge instructions reviewed with: Patient.  Patient and/or family verbalized understanding of discharge instructions and all questions answered.  Copy of AVS reviewed with patient and/or family.  Patient will return 10/16 for next appointment.  Patient discharged in stable condition accompanied by: self.  Departure Mode: Ambulatory.      Marely Sales RN  "

## 2023-10-12 NOTE — NURSING NOTE
Oncology Rooming Note    October 12, 2023 10:55 AM   Bonny Raymundo is a 77 year old female who presents for:    Chief Complaint   Patient presents with    Blood Draw     Labs drawn via PIV by RN in lab.  VS taken    Oncology Clinic Visit     Aplastic anemia      Initial Vitals: /68   Pulse 77   Temp 97.5  F (36.4  C) (Oral)   Resp 16   Wt 44.7 kg (98 lb 9.6 oz)   SpO2 99%   BMI 19.26 kg/m   Estimated body mass index is 19.26 kg/m  as calculated from the following:    Height as of 8/8/23: 1.524 m (5').    Weight as of this encounter: 44.7 kg (98 lb 9.6 oz). Body surface area is 1.38 meters squared.  No Pain (0) Comment: Data Unavailable   No LMP recorded. Patient has had a hysterectomy.  Allergies reviewed: Yes  Medications reviewed: Yes    Medications: Medication refills not needed today.  Pharmacy name entered into [facility]:    [facility] [place], [place] - [address], [address]  [facility] [place] - [address] [id number] - [place], [place] - [address] [facility]    Clinical concerns:        Leola Herman

## 2023-10-12 NOTE — LETTER
10/12/2023         RE: Bonny Raymundo  [address]  [place] [address]        Dear Colleague,    Thank you for referring your patient, Bonny Raymundo, to the [facility]. Please see a copy of my visit note below.    Hematology Clinic Visit  In person visit     Problem list:  - Myelodysplastic syndrome with excess blasts-2 (MDS-EB 2).  Bone marrow biopsy 12/15/2022.   IPSS-R score 8.5-very high - based on cytogenetics -7, > 10% blasts, hemoglobin less than 8, platelet , ANC less than 0.8     Final Diagnosis   A.  Bone Marrow Biopsy from left posterior iliac crest and peripheral smear morphology:  - Myelodysplastic syndrome with excess blasts 2 (MDS-EB2) showing:       - Increased marrow blasts (13.3% on imprint smear differential, 12% on flow cytometry).       - Mildly hypercellular marrow for age , 30-35% .      - Moderately increased marrow fibrosis (MF-2).      - No appreciable dysplasia.      - Increased marrow storage iron with 84% sideroblasts, 6% ringed sideroblasts      - Peripheral blood pancytopenia showing:            - Moderate macrocytic, normochromic anemia without evidence of hemolysis or increased red cell regeneration, rare circulating nucleated red blood cell.           - Moderate leukopenia with rare circulating blasts without Bernice rods.           - Moderate to marked thrombocytopenia.   Electronically signed by Rick Bangura MD on 12/19/2022    Cytogenetics-46,XX,-7,+21[20]  Detected Alterations of Known or Potential Pathogenicity: RUNX1 G199E   Detected Alterations of Uncertain Significance: None   Genes with No Detected Clinically Significant Alterations: ABL1, ALK, ASXL1, ATRX, BCOR, CALR, CBL, CEBPA, CSF3R, DNMT3A, ETV6, EZH2, FLT3, GATA1, GATA2, HRAS, IDH1, IDH2, JAK1, JAK2, JAK3, KIT, KMT2A, KRAS, MPL, NF1, NPM1, NRAS, PDGFRA, PDGFRB, PHF6, PTPN11, JAMA, SETBP1, SF1, SF3A1, SF3B1, SH2B3, SRSF2, TET2, TP53, U2AF2, WT1, ZRSR2     She  is now transfusion dependent. S/p 3 cycles azacitidine with a large gap between cycle 2 and cycle 3, no significant response ( 5/22-5/26/23, 6/19-6/23/23, 9/11-9/15/23).     - Chronic kidney disease stage IV  - Atrial fibrillation-on apixaban chronically  -History of iron deficiency anemia- Venofer 300 mg IV 7/7/2021, 8/6/2021, 4/14/2022  -History of B12 deficiency  -MGUS  - COPD  - GERD  -History of lower GI bleed 2017  -Severely decreased bilateral hearing  - Benign paroxysmal positional vertigo  - History of unprovoked left leg deep vein thrombosis (DVT)  3/17/21 and bilateral pulmonary embolism (PE) 9/11/12  - Osteoporosis with compression fracture of spine.  - Shingles right face  2022  - Fall with right hip fracture 1/16/2023  - Subdural hematoma related to fall from standing 1/16/2023-no surgical intervention needed     Interval history:  is here for follow-up of MDS-EB2.  I saw her on 8/16/2023.  She is accompanied by her son .   She received azacitidine 75 mg per metered squared subcu daily 5/22 - 5/26/23, and then dose decreased to 50 mg/m2 daily x 5, 6/19-6/23. She then had azacitidine delayed due to low counts and hospitalization for kidney stone. The received cycle 3 9/11-9/15/23. .  She has needed regular red cell and platelet transfusions. She feels fatigued. She says her mouth feels sore, not in any one particular spot. A tooth on her lower left hurts a bit, does not bother her when she chews. She has no bleeding symptoms other than bruises on arms and legs. No epistaxis, melena, hematochezia.     She is thinking about moving to a senior living apartment, which apparently has assisted living capability. She is concerned about the cost of transportation to and from visits and infusions. Does not want to use [facility] because concerned about reliability.       PHYSICAL EXAMINATION:  /68   Pulse 77   Temp 97.5  F (36.4  C) (Oral)   Resp 16   Wt 44.7 kg (98 lb 9.6 oz)   SpO2 99%    BMI 19.26 kg/m      General appearance:  Patient is 77 year old woman in no acute distress.  Cachectic.  She is able to climb up onto the exam table without assistance, but moves slowly.     HEENT:  No pallor, icterus. . Fair dentition.  No obvious thrush or aphthous ulcers in her mouth.   Lungs:  Clear to auscultation bilaterally.   Heart:  Regular rate and rhythm; no S3 S4 or murmer.     Abdomen:  Positive bowel sounds, soft and nontender, nondistended.  No hepatomegaly. No splenomegaly appreciated.    Extremities:  No joint swelling or tenderness.  Trace bilateral ankle edema.     Skin: Scattered ecchymoses on forearms, hands, lower legs.  No rash, no petechia.    Labs:   Latest Reference Range & Units 10/09/23 09:49 10/12/23 10:46   WBC 4.0 - 11.0 10e3/uL 2.2 (L) 1.7 (L)   Hemoglobin 11.7 - 15.7 g/dL 7.8 (L) 8.3 (L)   Hematocrit 35.0 - 47.0 % 24.8 (L) 25.7 (L)   Platelet Count 150 - 450 10e3/uL 16 (LL) 8 (LL)   RBC Count 3.80 - 5.20 10e6/uL 2.71 (L) 2.85 (L)   MCV 78 - 100 fL 92 90   MCH 26.5 - 33.0 pg 28.8 29.1   MCHC 31.5 - 36.5 g/dL 31.5 32.3   RDW 10.0 - 15.0 % 16.9 (H) 16.8 (H)   % Neutrophils % 23 29   % Lymphocytes % 52 47   % Monocytes % 17 19   % Eosinophils % 0 0   % Basophils % 0 0   % Blasts %  5   % Other Cells % 8    Absolute Basophils 0.0 - 0.2 10e3/uL 0.0 0.0   Absolute Neutrophil 1.6 - 8.3 10e3/uL 0.5 (L) 0.5 (L)   Absolute Lymphocytes 0.8 - 5.3 10e3/uL 1.1 0.8   Absolute Monocytes 0.0 - 1.3 10e3/uL 0.4 0.3   Absolute Eosinophils 0.0 - 0.7 10e3/uL 0.0 0.0   Absolute Blasts <=0.0 10e3/uL  0.1 (H)   Absolute Other Cells <=0.0 10e3/uL 0.2 (H)       Latest Reference Range & Units 10/12/23 10:46   Sodium 135 - 145 mmol/L 139   Potassium 3.4 - 5.3 mmol/L 4.1   Chloride 98 - 107 mmol/L 105   Carbon Dioxide (CO2) 22 - 29 mmol/L 28   Urea Nitrogen 8.0 - 23.0 mg/dL 37.3 (H)   Creatinine 0.51 - 0.95 mg/dL 1.02 (H)   GFR Estimate >60 mL/min/1.73m2 56 (L)   Calcium 8.8 - 10.2 mg/dL 9.4   Anion Gap 7 - 15  "mmol/L 6 (L)   Albumin 3.5 - 5.2 g/dL 3.3 (L)   Protein Total 6.4 - 8.3 g/dL 7.3   Alkaline Phosphatase 35 - 104 U/L 76   ALT 0 - 50 U/L <5   AST 0 - 45 U/L 7   Bilirubin Total <=1.2 mg/dL 0.6   (H): Data is abnormally high  (L): Data is abnormally low    Assessment and recommendation:     # MDS-EB 2- IPSS-R score very high risk-this means her median overall survival is 0.8 years and it 25% chance of developing acute leukemia within 0.7 years.    She has had 3 cycles of azacitidine with out any significant improvement of her blood counts.  She is transfusion dependent for red cells and for platelets.  I doubt that giving more azacitidine would be of any benefit for her, and it is better than some 2 come to the clinic 5 days a week for infusions.  I also discussed with her and her son that these transfusions are palliative and are not treating underlying disease.  She is able to tell me in her own words that she has this blood problem and that there is not much else that can be done other than transfusions and that she will die from it.  She asked specifically about how long she might live and I told her months not years.  I discussed that I could not determine exactly what would happen, but bleeding, infection, or just overall \"wearing out\" are possibilities.  -Discontinue azacitidine  -Continue prophylactic levofloxacin and acyclovir.     #Anemia, thrombocytopenia-this is due to mainly to the MDS.  She has not responded to Aranesp.   For transfusions, either the [facility] or [facility] is okay.  She is bothered by the long wait to get units of blood, not fully understanding that it takes a while to do the crossmatch and spite of having type and screen drawn every time.  She now is willing to come a day ahead of time to have a type and cross done so they can find a unit of blood for her if it is needed.    She wants to continue to have twice a week lab checks and as needed RBC and platelet transfusions.  - Labs 2 times a " week, with the infusion scheduled for the next day.  -Transfuse 1 unit PRBCs for hemoglobin 7.0-8.5, 2 units for hemoglobin < 7.    -transfuse 1 unit platelets for platelet count <10k or serious bleeding.     #Frailty.- her weight has remained about the same over the past few months.  Nothing objective on neurologic exam reagarding left arm- may be her shoulder, no specific intervention.   -Encouraged her to drink 1 bottle of Ensure daily.-Palliative care consult- hasn't happened.         #Chronic kidney disease-creatinine a bit higher today  -Push oral fluids     #Subdural hematoma and subarachnoid hemorrhage after a fall- she had a DVT a couple of years ago. Risk of bleeding while on anticoagulation outweighs risk of thrombosis in view of significant chronic thrombocytopenia.  Because she is not having significant bleeding symptoms, and platelet transfusions do not last very long, it is not practical to do chronic platelet transfusions, and would be unlikely to change her overall prognosis.  -No apixaban or other anticoagulant    #Psychosocial- she has considered moving into a senior apartment. She asked if it is worth moving and it is difficult for me to answer that question.  If they had an assisted living situation that she can hook into right away that might be good because they can help her with activities of daily living and those can be ramped up as needed when she becomes ill.  On the other hand it is a lot of work to move, and if she can afford it, she could get personal care attendant to come to her home.  I discussed that sometimes we would consider hospice, however because she is receiving palliative transfusions, that is not an option for her.  I offered her to have a discussion with social work, not clear if she we will follow-up on that.     #CODE STATUS-   a DO NOT RESUSCITATE order was placed in chart 1/11/2023.        RTC Dec 7, transfusion to coincide     Time: I spent a total of 60 minutes on  the day of the visit. Please see the note for further information on patient assessment and treatment.         Minerva Spann MD  Hematology

## 2023-10-12 NOTE — PATIENT INSTRUCTIONS
Contact Numbers  [facility]: [phone] (for symptom and scheduling needs)    Please call the [facility] Triage line if you experience a temperature greater than or equal to 100.4, shaking chills, have uncontrolled nausea, vomiting and/or diarrhea, dizziness, shortness of breath, chest pain, bleeding, unexplained bruising, or if you have any other new/concerning symptoms, questions or concerns.     If you are having any concerning symptoms or wish to speak to a provider before your next infusion visit, please call your care coordinator or triage to notify them so we can adequately serve you.     If you need a refill on a narcotic prescription or other medication, please call triage before your infusion appointment.          Lab Results:  Recent Results (from the past 12 hour(s))   Comprehensive metabolic panel    Collection Time: 10/12/23 10:46 AM   Result Value Ref Range    Sodium 139 135 - 145 mmol/L    Potassium 4.1 3.4 - 5.3 mmol/L    Carbon Dioxide (CO2) 28 22 - 29 mmol/L    Anion Gap 6 (L) 7 - 15 mmol/L    Urea Nitrogen 37.3 (H) 8.0 - 23.0 mg/dL    Creatinine 1.02 (H) 0.51 - 0.95 mg/dL    GFR Estimate 56 (L) >60 mL/min/1.73m2    Calcium 9.4 8.8 - 10.2 mg/dL    Chloride 105 98 - 107 mmol/L    Glucose 109 (H) 70 - 99 mg/dL    Alkaline Phosphatase 76 35 - 104 U/L    AST 7 0 - 45 U/L    ALT <5 0 - 50 U/L    Protein Total 7.3 6.4 - 8.3 g/dL    Albumin 3.3 (L) 3.5 - 5.2 g/dL    Bilirubin Total 0.6 <=1.2 mg/dL   Adult Type and Screen    Collection Time: 10/12/23 10:46 AM   Result Value Ref Range    ABO/RH(D) A POS     Antibody Screen Negative Negative    SPECIMEN EXPIRATION DATE [id number]    CBC with platelets and differential    Collection Time: 10/12/23 10:46 AM   Result Value Ref Range    WBC Count 1.7 (L) 4.0 - 11.0 10e3/uL    RBC Count 2.85 (L) 3.80 - 5.20 10e6/uL    Hemoglobin 8.3 (L) 11.7 - 15.7 g/dL    Hematocrit 25.7 (L) 35.0 - 47.0 %    MCV 90 78 - 100 fL    MCH 29.1 26.5 - 33.0 pg    MCHC 32.3 31.5  - 36.5 g/dL    RDW 16.8 (H) 10.0 - 15.0 %    Platelet Count 8 (LL) 150 - 450 10e3/uL    % Neutrophils      % Lymphocytes      % Monocytes      Mids % (Monos, Eos, Basos)      % Eosinophils      % Basophils      % Immature Granulocytes      NRBCs per 100 WBC 0 <1 /100    Absolute Neutrophils      Absolute Lymphocytes      Absolute Monocytes      Mids Abs (Monos, Eos, Basos)      Absolute Eosinophils      Absolute Basophils      Absolute Immature Granulocytes      Absolute NRBCs 0.0 10e3/uL   Manual Differential    Collection Time: 10/12/23 10:46 AM   Result Value Ref Range    % Neutrophils 29 %    % Lymphocytes 47 %    % Monocytes 19 %    % Eosinophils 0 %    % Basophils 0 %    % Blasts 5 %    Absolute Neutrophils 0.5 (L) 1.6 - 8.3 10e3/uL    Absolute Lymphocytes 0.8 0.8 - 5.3 10e3/uL    Absolute Monocytes 0.3 0.0 - 1.3 10e3/uL    Absolute Eosinophils 0.0 0.0 - 0.7 10e3/uL    Absolute Basophils 0.0 0.0 - 0.2 10e3/uL    Absolute Blasts 0.1 (H) <=0.0 10e3/uL    RBC Morphology Confirmed RBC Indices     Platelet Assessment  Automated Count Confirmed. Platelet morphology is normal.     Automated Count Confirmed. Platelet morphology is normal.    Dexter Cells Slight (A) None Seen

## 2023-10-12 NOTE — PROGRESS NOTES
Hematology Clinic Visit  In person visit     Problem list:  - Myelodysplastic syndrome with excess blasts-2 (MDS-EB 2).  Bone marrow biopsy 12/15/2022.   IPSS-R score 8.5-very high - based on cytogenetics -7, > 10% blasts, hemoglobin less than 8, platelet , ANC less than 0.8     Final Diagnosis   A.  Bone Marrow Biopsy from left posterior iliac crest and peripheral smear morphology:  - Myelodysplastic syndrome with excess blasts 2 (MDS-EB2) showing:       - Increased marrow blasts (13.3% on imprint smear differential, 12% on flow cytometry).       - Mildly hypercellular marrow for age , 30-35% .      - Moderately increased marrow fibrosis (MF-2).      - No appreciable dysplasia.      - Increased marrow storage iron with 84% sideroblasts, 6% ringed sideroblasts      - Peripheral blood pancytopenia showing:            - Moderate macrocytic, normochromic anemia without evidence of hemolysis or increased red cell regeneration, rare circulating nucleated red blood cell.           - Moderate leukopenia with rare circulating blasts without Bernice rods.           - Moderate to marked thrombocytopenia.   Electronically signed by Rick Bangura MD on 12/19/2022    Cytogenetics-46,XX,-7,+21[20]  Detected Alterations of Known or Potential Pathogenicity: RUNX1 G199E   Detected Alterations of Uncertain Significance: None   Genes with No Detected Clinically Significant Alterations: ABL1, ALK, ASXL1, ATRX, BCOR, CALR, CBL, CEBPA, CSF3R, DNMT3A, ETV6, EZH2, FLT3, GATA1, GATA2, HRAS, IDH1, IDH2, JAK1, JAK2, JAK3, KIT, KMT2A, KRAS, MPL, NF1, NPM1, NRAS, PDGFRA, PDGFRB, PHF6, PTPN11, JAMA, SETBP1, SF1, SF3A1, SF3B1, SH2B3, SRSF2, TET2, TP53, U2AF2, WT1, ZRSR2     She is now transfusion dependent. S/p 3 cycles azacitidine with a large gap between cycle 2 and cycle 3, no significant response ( 5/22-5/26/23, 6/19-6/23/23, 9/11-9/15/23).     - Chronic kidney disease stage IV  - Atrial fibrillation-on apixaban  chronically  -History of iron deficiency anemia- Venofer 300 mg IV 7/7/2021, 8/6/2021, 4/14/2022  -History of B12 deficiency  -MGUS  - COPD  - GERD  -History of lower GI bleed 2017  -Severely decreased bilateral hearing  - Benign paroxysmal positional vertigo  - History of unprovoked left leg deep vein thrombosis (DVT)  3/17/21 and bilateral pulmonary embolism (PE) 9/11/12  - Osteoporosis with compression fracture of spine.  - Shingles right face  2022  - Fall with right hip fracture 1/16/2023  - Subdural hematoma related to fall from standing 1/16/2023-no surgical intervention needed     Interval history:  is here for follow-up of MDS-EB2.  I saw her on 8/16/2023.  She is accompanied by her son .   She received azacitidine 75 mg per metered squared subcu daily 5/22 - 5/26/23, and then dose decreased to 50 mg/m2 daily x 5, 6/19-6/23. She then had azacitidine delayed due to low counts and hospitalization for kidney stone. The received cycle 3 9/11-9/15/23. .  She has needed regular red cell and platelet transfusions. She feels fatigued. She says her mouth feels sore, not in any one particular spot. A tooth on her lower left hurts a bit, does not bother her when she chews. She has no bleeding symptoms other than bruises on arms and legs. No epistaxis, melena, hematochezia.     She is thinking about moving to a senior living apartment, which apparently has assisted living capability. She is concerned about the cost of transportation to and from visits and infusions. Does not want to use [employer] because concerned about reliability.       PHYSICAL EXAMINATION:  /68   Pulse 77   Temp 97.5  F (36.4  C) (Oral)   Resp 16   Wt 44.7 kg (98 lb 9.6 oz)   SpO2 99%   BMI 19.26 kg/m      General appearance:  Patient is 77 year old woman in no acute distress.  Cachectic.  She is able to climb up onto the exam table without assistance, but moves slowly.     HEENT:  No pallor, icterus. . Fair dentition.   No obvious thrush or aphthous ulcers in her mouth.   Lungs:  Clear to auscultation bilaterally.   Heart:  Regular rate and rhythm; no S3 S4 or murmer.     Abdomen:  Positive bowel sounds, soft and nontender, nondistended.  No hepatomegaly. No splenomegaly appreciated.    Extremities:  No joint swelling or tenderness.  Trace bilateral ankle edema.     Skin: Scattered ecchymoses on forearms, hands, lower legs.  No rash, no petechia.    Labs:   Latest Reference Range & Units 10/09/23 09:49 10/12/23 10:46   WBC 4.0 - 11.0 10e3/uL 2.2 (L) 1.7 (L)   Hemoglobin 11.7 - 15.7 g/dL 7.8 (L) 8.3 (L)   Hematocrit 35.0 - 47.0 % 24.8 (L) 25.7 (L)   Platelet Count 150 - 450 10e3/uL 16 (LL) 8 (LL)   RBC Count 3.80 - 5.20 10e6/uL 2.71 (L) 2.85 (L)   MCV 78 - 100 fL 92 90   MCH 26.5 - 33.0 pg 28.8 29.1   MCHC 31.5 - 36.5 g/dL 31.5 32.3   RDW 10.0 - 15.0 % 16.9 (H) 16.8 (H)   % Neutrophils % 23 29   % Lymphocytes % 52 47   % Monocytes % 17 19   % Eosinophils % 0 0   % Basophils % 0 0   % Blasts %  5   % Other Cells % 8    Absolute Basophils 0.0 - 0.2 10e3/uL 0.0 0.0   Absolute Neutrophil 1.6 - 8.3 10e3/uL 0.5 (L) 0.5 (L)   Absolute Lymphocytes 0.8 - 5.3 10e3/uL 1.1 0.8   Absolute Monocytes 0.0 - 1.3 10e3/uL 0.4 0.3   Absolute Eosinophils 0.0 - 0.7 10e3/uL 0.0 0.0   Absolute Blasts <=0.0 10e3/uL  0.1 (H)   Absolute Other Cells <=0.0 10e3/uL 0.2 (H)       Latest Reference Range & Units 10/12/23 10:46   Sodium 135 - 145 mmol/L 139   Potassium 3.4 - 5.3 mmol/L 4.1   Chloride 98 - 107 mmol/L 105   Carbon Dioxide (CO2) 22 - 29 mmol/L 28   Urea Nitrogen 8.0 - 23.0 mg/dL 37.3 (H)   Creatinine 0.51 - 0.95 mg/dL 1.02 (H)   GFR Estimate >60 mL/min/1.73m2 56 (L)   Calcium 8.8 - 10.2 mg/dL 9.4   Anion Gap 7 - 15 mmol/L 6 (L)   Albumin 3.5 - 5.2 g/dL 3.3 (L)   Protein Total 6.4 - 8.3 g/dL 7.3   Alkaline Phosphatase 35 - 104 U/L 76   ALT 0 - 50 U/L <5   AST 0 - 45 U/L 7   Bilirubin Total <=1.2 mg/dL 0.6   (H): Data is abnormally high  (L): Data is  "abnormally low    Assessment and recommendation:     # MDS-EB 2- IPSS-R score very high risk-this means her median overall survival is 0.8 years and it 25% chance of developing acute leukemia within 0.7 years.    She has had 3 cycles of azacitidine with out any significant improvement of her blood counts.  She is transfusion dependent for red cells and for platelets.  I doubt that giving more azacitidine would be of any benefit for her, and it is better than some 2 come to the clinic 5 days a week for infusions.  I also discussed with her and her son that these transfusions are palliative and are not treating underlying disease.  She is able to tell me in her own words that she has this blood problem and that there is not much else that can be done other than transfusions and that she will die from it.  She asked specifically about how long she might live and I told her months not years.  I discussed that I could not determine exactly what would happen, but bleeding, infection, or just overall \"wearing out\" are possibilities.  -Discontinue azacitidine  -Continue prophylactic levofloxacin and acyclovir.     #Anemia, thrombocytopenia-this is due to mainly to the MDS.  She has not responded to Aranesp.   [place] [facility] [place] [facility] [place].  She is bothered by the long wait to get units of blood, not fully understanding that it takes a while to do the crossmatch and spite of having type and screen drawn every time.  She now is willing to come a day ahead of time to have a type and cross done so they can find a unit of blood for her if it is needed.    She wants to continue to have twice a week lab checks and as needed RBC and platelet transfusions.  - Labs 2 times a week, with the infusion scheduled for the next day.  -Transfuse 1 unit PRBCs for hemoglobin 7.0-8.5, 2 units for hemoglobin < 7.    -transfuse 1 unit platelets for platelet count <10k or serious bleeding.     #Frailty.- her weight has " remained about the same over the past few months.  Nothing objective on neurologic exam reagarding left arm- may be her shoulder, no specific intervention.   -Encouraged her to drink 1 bottle of Ensure daily.-Palliative care consult- hasn't happened.         #Chronic kidney disease-creatinine a bit higher today  -Push oral fluids     #Subdural hematoma and subarachnoid hemorrhage after a fall- she had a DVT a couple of years ago. Risk of bleeding while on anticoagulation outweighs risk of thrombosis in view of significant chronic thrombocytopenia.  Because she is not having significant bleeding symptoms, and platelet transfusions do not last very long, it is not practical to do chronic platelet transfusions, and would be unlikely to change her overall prognosis.  -No apixaban or other anticoagulant    #Psychosocial- she has considered moving into a senior apartment. She asked if it is worth moving and it is difficult for me to answer that question.  If they had an assisted living situation that she can hook into right away that might be good because they can help her with activities of daily living and those can be ramped up as needed when she becomes ill.  On the other hand it is a lot of work to move, and if she can afford it, she could get personal care attendant to come to her home.  I discussed that sometimes we would consider hospice, however because she is receiving palliative transfusions, that is not an option for her.  I offered her to have a discussion with social work, not clear if she we will follow-up on that.     #CODE STATUS-   a DO NOT RESUSCITATE order was placed in chart 1/11/2023.        RTC Dec 7, transfusion to coincide     Time: I spent a total of 60 minutes on the day of the visit. Please see the note for further information on patient assessment and treatment.         Minerva Spann MD  Hematology

## 2023-10-13 NOTE — TELEPHONE ENCOUNTER
Looking for recertification orders:   Skilled nursing once every other week for 8 weeks.     Verbal order given for same.

## 2023-10-16 NOTE — TELEPHONE ENCOUNTER
DATE:  10/16/2023   TIME OF RECEIPT FROM LAB:  1205  Critical lab value:  Prelim: WBC: 1.6, ANC: 0.3 Plts: 9 ; Other Hgb: 8.6  Transfusion scheduled for tomorrow for plts/blood  Blood/plt plan: HGB: 7.1-8.5 1 unit, </= 7,  2 Units/plts </= 10,000  Last lab values:  10/12: WBC: 1.7, ANC: 0.5, Plts: 8, Hgb: 8.3  Provider Notified: Yes  Provider Name: Minerva Spann MD  Date/Time lab value reported to provider: 10/16/23 at 1215  Mechanism of provider notification: paged  Provider response: Dr. Spann called back and okay to wait until tomorrow for scheduled blood and platelet transfusion. Updated Infusion.

## 2023-10-17 NOTE — PROGRESS NOTES
Infusion Nursing Note:  Bonny Raymundo presents today for 1 unit RBC-1 unit Platelets.    Patient seen by provider today: No   present during visit today: Not Applicable.    Note: Patient denies the following: fevers, body aches, chills, headaches, vision changes, chest pain, shortness of breath, nausea, vomiting, constipation, abdominal pain, rashes, bleeding, mouth sores.    -baseline fatigue  -right knee pain is better, changed how leg was wrapped   -using an ace bandage  -denies any bleeding    ++Pt declines PO Benadryl prior to platelets  ++Took only Tylenol  ++Does not think she had a previous reaction (reaction documented in RN note on 9/29/23)      Intravenous Access:  Peripheral IV placed.    Treatment Conditions:  Blood transfusion consent signed 1/11/23.   Latest Reference Range & Units 10/16/23 11:49   Sodium 135 - 145 mmol/L 139   Potassium 3.4 - 5.3 mmol/L 4.6   Chloride 98 - 107 mmol/L 102   Carbon Dioxide (CO2) 22 - 29 mmol/L 27   Urea Nitrogen 8.0 - 23.0 mg/dL 35.5 (H)   Creatinine 0.51 - 0.95 mg/dL 1.04 (H)   GFR Estimate >60 mL/min/1.73m2 54 (L)   Calcium 8.8 - 10.2 mg/dL 9.3   Anion Gap 7 - 15 mmol/L 10   Albumin 3.5 - 5.2 g/dL 3.4 (L)   Protein Total 6.4 - 8.3 g/dL 7.5   Alkaline Phosphatase 35 - 104 U/L 80   ALT 0 - 50 U/L <5   AST 0 - 45 U/L 11   Bilirubin Total <=1.2 mg/dL 0.6   Glucose 70 - 99 mg/dL 109 (H)   WBC 4.0 - 11.0 10e3/uL 1.6 (L)   Hemoglobin 11.7 - 15.7 g/dL 8.5 (L)   Hematocrit 35.0 - 47.0 % 27.4 (L)   Platelet Count 150 - 450 10e3/uL 10 (LL)   RBC Count 3.80 - 5.20 10e6/uL 3.06 (L)   MCV 78 - 100 fL 90   MCH 26.5 - 33.0 pg 27.8   MCHC 31.5 - 36.5 g/dL 31.0 (L)   RDW 10.0 - 15.0 % 18.2 (H)   % Neutrophils % 25   % Lymphocytes % 46   % Monocytes % 19   % Eosinophils % 0   % Basophils % 2   % Blasts % 8   Absolute Basophils 0.0 - 0.2 10e3/uL 0.0   NRBC/W <=0 % 1 (H)   Absolute Neutrophil 1.6 - 8.3 10e3/uL 0.4 (LL)   Absolute Lymphocytes 0.8 - 5.3 10e3/uL 0.7 (L)    Absolute Monocytes 0.0 - 1.3 10e3/uL 0.3   Absolute Eosinophils 0.0 - 0.7 10e3/uL 0.0   Absolute Blasts <=0.0 10e3/uL 0.1 (H)   Absolute NRBCs 10e3/uL 0.0   Absolute NRBCs <=0.0 10e3/uL 0.0   NRBCs per 100 WBC <1 /100 0   RBC Morphology  Confirmed RBC Indices   Platelet Morphology Automated Count Confirmed. Platelet morphology is normal.  Automated Count Confirmed. Platelet morphology is normal.   Acanthocytes None Seen  Slight !   ABO/Rh(D)  A POS   Antibody Screen Negative  Negative   SPECIMEN EXPIRATION DATE  [id number]       Post Infusion Assessment:  Patient tolerated infusion without incident.  Blood return noted pre and post infusion.  Site patent and intact, free from redness, edema or discomfort.  No evidence of extravasations.  Access discontinued per protocol.       Discharge Plan:   Patient declined prescription refills.  Discharge instructions reviewed with: Patient.  Patient and/or family verbalized understanding of discharge instructions and all questions answered.  AVS to patient via [facility].  Patient will return 10/19 for labs in [place] and is on [facility] for infusion on 10/20.   Patient discharged in stable condition accompanied by: self.  Departure Mode: Ambulatory with cane.    Shaunna Parson RN

## 2023-10-19 NOTE — TELEPHONE ENCOUNTER
DATE/TIME OF CALL RECEIVED FROM LAB:  10/19/23 at 10:04 AM   LAB TEST/VALUE:  preliminary WBC 1.4, preliminary plt 5, ANC 0.3  (Previous lab on 10/16/23 WBC 1.6, plt 10, ANC 0.4)   PROVIDER NOTIFIED?: Yes  PROVIDER NAME: Dr. Minerva Spann  TIME LAB VALUE REPORTED TO PROVIDER: 2831, 1103  MECHANISM OF PROVIDER NOTIFICATION: Page  PROVIDER RESPONSE:  1120 return call from Dr. Spann, who states unless pt is having bleeding, okay to come in Saturday for transfusion as scheduled.  Pt scheduled for transfusion on 10/21/23 at [facility]. Pt is on oral levofloxacin and acyclovir.   1124 LVM for pt informing of lab results and per Dr. Spann, if not having any signs of bleeding, okay to come to transfusion Saturday as currently scheduled. Writer stressed if signs of bleeding, dizziness, fever occurs she should seek ED eval immediately. Triage number left for call back [phone] option 2, option 2.

## 2023-10-23 NOTE — PROGRESS NOTES
Phone call from lab to conclude plt count of 2,000 and Hemoglobin of 6.8. Per chart review, nurse triage has already contacted Dr. Spann per documentation from 1/23/23 at 1550.

## 2023-10-23 NOTE — TELEPHONE ENCOUNTER
DATE/TIME OF CALL RECEIVED FROM LAB:  10/23/23 at 2:28 PM   LAB TEST:  WBC 1.5  ANC 0.2  Hgb 7.0  Plt 4  Last LAB VALUE:  WBC 1.5  ANC 0.3  Hgb 8.6  Plt 5  Scheduled for transfusion 10/24/23  PROVIDER NOTIFIED?: Yes  PROVIDER NAME:   DATE/TIME LAB VALUE REPORTED TO PROVIDER: 10/23/2023 5236  MECHANISM OF PROVIDER NOTIFICATION: Page  PROVIDER RESPONSE:   [phone]  returning call, states unless pt having bleeding, okay to keep current plan of transfusion tomorrow.   1440 LVM for pt informing of lab results and per Dr. Spann, if not having any signs of bleeding, okay to come to transfusion Saturday as currently scheduled. Writer stressed if signs of bleeding, dizziness, fever occurs she should seek ED eval immediately. Triage number left for call back [phone] option 5, option 2.

## 2023-10-24 NOTE — PROGRESS NOTES
Infusion Nursing Note:  Bonny Raymundo presents today for 1 unit Platelets + 2 units PRBC's.    Patient seen by provider today: No   present during visit today: Not Applicable.    Note: Patient denies any signs or symptoms of infection. Patient denies any signs or symptoms of bleeding. Patient reports ongoing fatigue and OCASIO but remains unchanged. Patient complains of 2/10 arthritic knee pain; Tylenol x1 given (as a pre-medication for blood products) and agreeable for pain management as well per patient. Patient offers no other complaints or concerns.    Patient refusing Benadryl before blood products. Only Tylenol x1 given as a pre-medication.    Intravenous Access:  Peripheral IV placed.    Treatment Conditions:  Lab Results   Component Value Date    HGB 6.8 (LL) 10/23/2023    WBC 1.5 (L) 10/23/2023    ANEU 0.2 (LL) 10/23/2023    ANEUTAUTO 1.3 (L) 09/12/2022    PLT 2 (LL) 10/23/2023     Results reviewed, labs MET treatment parameters, ok to proceed with treatment.  Transfuse platelets if platelet count is less than 10k. Platelets 2k today.  Transfuse 2 units of PRBC's if hemoglobin is less than 7.0. Hemoglobin 6.8 today.    Blood transfusion consent signed 01/11/23.    Post Infusion Assessment:  Patient tolerated infusion without incident.  Blood return noted pre and post infusion.  Site patent and intact, free from redness, edema or discomfort.  No evidence of extravasations.  Access discontinued per protocol.     Discharge Plan:   Prescription refills given for Levaquin.  Discharge instructions reviewed with: Patient.  Patient and/or family verbalized understanding of discharge instructions and all questions answered.  AVS to patient via [employer].  Patient will return 10/26/23 for labs and 10/28/23 for next infusion appointment.   Patient discharged in stable condition accompanied by: self.  Departure Mode: Ambulatory with cane.      Sue Saez RN

## 2023-10-24 NOTE — PATIENT INSTRUCTIONS
[facility] Triage and after hours / weekends / holidays:  [phone]    Please call the triage or after hours line if you experience a temperature greater than or equal to 100.4, shaking chills, have uncontrolled nausea, vomiting and/or diarrhea, dizziness, shortness of breath, chest pain, bleeding, unexplained bruising, or if you have any other new/concerning symptoms, questions or concerns.      If you are having any concerning symptoms or wish to speak to a provider before your next infusion visit, please call triage to notify them so we can adequately serve you.     If you need a refill on a narcotic prescription or other medication, please call before your infusion appointment.

## 2023-10-26 NOTE — TELEPHONE ENCOUNTER
DATE/TIME OF CALL RECEIVED FROM LAB:  10/26/23 at 10:36 AM   LAB TEST:  Preliminary   WBC 0.6  ANC 0.2  Plt 2  Last LAB VALUE:  10/23/23  WBC 1.5  ANC 0.2  Plt 2  10/28-scheduled for blood transfusion  PROVIDER NOTIFIED?: Yes  PROVIDER NAME:   DATE/TIME LAB VALUE REPORTED TO PROVIDER: 10/26/2023  MECHANISM OF PROVIDER NOTIFICATION: Page  PROVIDER RESPONSE:   [id number]  calling to acknowledge critical lab and state okay to wait for transfusion scheduled for 10/28/23.

## 2023-10-28 NOTE — PROGRESS NOTES
Infusion Nursing Note:  Bonny Raymundo presents today for 1 unit PRBC/ 1 unit platelets.    Patient seen by provider today: No   present during visit today: Not Applicable.    Note: N/A.      Intravenous Access:  Peripheral IV placed.    Treatment Conditions:  Lab Results   Component Value Date    HGB 8.2 (L) 10/26/2023    WBC 1.5 (L) 10/26/2023    ANEU 0.5 (L) 10/26/2023    ANEUTAUTO 1.3 (L) 09/12/2022    PLT 3 (LL) 10/26/2023        Results reviewed, labs MET treatment parameters, ok to proceed with treatment.      Post Infusion Assessment:  Patient tolerated transfusions without incident.  Blood return noted pre and post infusion.  Site patent and intact, free from redness, edema or discomfort.  No evidence of extravasations.  Access discontinued per protocol.       Discharge Plan:   Discharge instructions reviewed with: Patient.  Patient and/or family verbalized understanding of discharge instructions and all questions answered.  Patient discharged in stable condition accompanied by: self.  Departure Mode: Ambulatory.      Merry Bean RN

## 2023-10-30 NOTE — TELEPHONE ENCOUNTER
DATE/TIME OF CALL RECEIVED FROM LAB:  10/30/23 at 11:00 AM     LAB TEST & LAB VALUE:  Critical prelminary  WBC 1.5  Plts 6  ANC 0.3    Other value:  Hgb 8.6    Previous Labs from 10/26/23 WBC 1.5, Plts3, ANC0.5 Hgb8.2    PROVIDER NOTIFIED?: Yes    PROVIDER NAME: Dr. Spann    TIME LAB VALUE REPORTED TO PROVIDER:   1104    MECHANISM OF PROVIDER NOTIFICATION: Page    Pt has transfusion appt scheduled for tomorrow 10/31/23.     1105 This writer called pt Bonny with values, Pt does have a bruise on right foot which is getting better. Denies fever, chest pain, SOB, bleeding gums, teeth, nose, blood in urine or stool.  Pt feels will be okay to wait for transfusion tomorrow 10/31/23.     PROVIDER RESPONSE:   1120 Per Dr. Spann,okay to wait for transfusion tomorrow 10/31/23.

## 2023-10-31 NOTE — PROGRESS NOTES
Infusion Nursing Note:  Bonny Raymundo presents today for One unit PRBCs, one bag platelets.    Patient seen by provider today: No   present during visit today: Not Applicable.    Note: Patient reports to increased fatigue today.  Denies bleeding or excess bruising currently.  Tolerated infusions well today.      Intravenous Access:  Peripheral IV placed.    Treatment Conditions:  Lab Results   Component Value Date    HGB 8.4 (L) 10/30/2023    WBC 1.6 (L) 10/30/2023    ANEU 0.2 (LL) 10/30/2023    ANEUTAUTO 1.3 (L) 09/12/2022    PLT 2 (LL) 10/30/2023        Lab Results   Component Value Date     10/26/2023    POTASSIUM 3.9 10/26/2023    MAG 2.0 08/11/2023    CR 1.09 (H) 10/26/2023    LEO 9.4 10/26/2023    BILITOTAL 0.7 10/26/2023    ALBUMIN 3.3 (L) 10/26/2023    ALT <5 10/26/2023    AST 12 10/26/2023       Blood transfusion consent signed 1/11/23.      Post Infusion Assessment:  Patient tolerated infusion without incident.  Blood return noted pre and post infusion.  Site patent and intact, free from redness, edema or discomfort.  No evidence of extravasations.  Access discontinued per protocol.       Discharge Plan:   Patient declined prescription refills.  Discharge instructions reviewed with: Patient.  Patient and/or family verbalized understanding of discharge instructions and all questions answered.  AVS to patient via [employer].  Patient will return 11/2/23 for lab draw for next appointment.   Patient discharged in stable condition accompanied by: self.  Departure Mode: Ambulatory.      Sue Conner RN

## 2023-11-03 NOTE — TELEPHONE ENCOUNTER
DATE/TIME OF CALL RECEIVED FROM LAB:  11/03/23 at 11:42 AM   LAB TEST:  ANC 0.2,  Plt 9  Last LAB VALUE:  10/30/23 ANC 0.2, Plt 2  PROVIDER NOTIFIED?: Yes  PROVIDER NAME:   DATE/TIME LAB VALUE REPORTED TO PROVIDER: 11/3/2023  MECHANISM OF PROVIDER NOTIFICATION:  Routed to provider  Pt has transfusion scheduled for 11/04/23.  1202  This writer called pt Bonny with values. Denies fever, chest pain, SOB, bleeding gums, teeth, nose, blood in urine or stool. Pt feels will be okay to wait for transfusion tomorrow.

## 2023-11-04 NOTE — PROGRESS NOTES
Infusion Nursing Note:  Bonny Raymundo presents today for 1 unit PRBC/1 unit Platelets.    Patient seen by provider today: No   present during visit today: Not Applicable.    Note: N/A.      Intravenous Access:  Peripheral IV placed.    Treatment Conditions:  Lab Results   Component Value Date    HGB 7.9 (L) 11/03/2023    WBC 1.2 (L) 11/03/2023    ANEU 0.2 (LL) 11/03/2023    ANEUTAUTO 1.3 (L) 09/12/2022    PLT 9 (LL) 11/03/2023        Results reviewed, labs MET treatment parameters, ok to proceed with treatment.  Blood transfusion consent signed 01/11/23.      Post Infusion Assessment:  Patient tolerated infusion without incident.  Blood return noted pre and post infusion.  Site patent and intact, free from redness, edema or discomfort.  No evidence of extravasations.  Access discontinued per protocol.       Discharge Plan:   Patient declined prescription refills.  Discharge instructions reviewed with: Patient.  Patient and/or family verbalized understanding of discharge instructions and all questions answered.  AVS to patient via [employer].  Patient will return 11/7 for next appointment.   Patient discharged in stable condition accompanied by: self.  Departure Mode: Ambulatory.      Rohith Bell RN

## 2023-11-06 NOTE — TELEPHONE ENCOUNTER
DATE:  11/6/2023   TIME OF RECEIPT FROM LAB:  1155  Critical lab value:  ANC: 0.2, WBC: 1.6; Other Plts: 19; Hgb: 9.2  Last lab values:  11/3/95: WBC: 1.2 ANC: 0.2 Hgb: 7.9, Plts: 9  Blood/Platelet plan: </= 7: 2 U RBC; 7.1-8.5 1 U RBC; plts </= 10,000  Possible Transfusion apt on 11/7/23  Per Dr. Spann's note on 10/12: Continue prophylactic levofloxacin and acyclovir.  Provider Notified: Yes  Provider Name: Dr. Minerva Spann  Date/Time lab value reported to provider: 11/6/23 at 1203  Mechanism of provider notification: Message routed to provider  Provider response: Paged Dr. Spann at [phone] to see if she wants pt to come for transfusion. No transfusion needed and will have labs rechecked on Thursday.

## 2023-11-06 NOTE — PROGRESS NOTES
Medical Assistant Note:  Bonny Raymundo presents today for lab draw.    Patient seen by provider today: No.   present during visit today: Not Applicable.    Concerns: No Concerns.    Procedure:  Lab draw site: LAC, Needle type: BF, Gauge: 21. Gauze and applied    Post Assessment:  Labs drawn without difficulty: Yes.    Discharge Plan:  Departure Mode: Ambulatory.    Face to Face Time: 5.    Ramya Zabala CMA

## 2023-11-07 NOTE — PROGRESS NOTES
[facility]: Cancer Care Follow-Up Note                                    Discussion with Patient:                                                      Lm for pt that labs are out of parameters for transfusion and that she would not need transfusion today.            Dates of Treatment:                                                      Infusion given in last 28 days       None            Assessment:                                                        Plan of Care Education Review:   Assessment completed with:: Patient    Plan of Care Education   Plan of Care:: Treatment schedule;Lab appointment    Evaluation of Learning  Patient Education Provided: Yes           Intervention/Education provided during outreach:                                                         Patient to follow up as scheduled at next appt  Patient to call/[employer] message with updates  Results Notification: Results dated (11/6/23) discussed wtih patient over telephone  Confirmed patient has clinic and triage numbers    Signature:  Sue Marquez RN

## 2023-11-09 NOTE — TELEPHONE ENCOUNTER
"Reason for Disposition   Thigh, calf, or ankle swelling in only one leg    Additional Information   Negative: Chest pain   Negative: Followed an insect bite and has localized swelling (e.g., small area of puffy or swollen skin)   Negative: Followed a knee injury   Negative: Ankle or foot injury   Negative: Pregnant with leg swelling or edema   Negative: Difficulty breathing at rest   Negative: Entire foot is cool or blue in comparison to other side   Negative: SEVERE swelling (e.g., swelling extends above knee, entire leg is swollen, weeping fluid)   Negative: Cast on leg or ankle and has increasing pain   Negative: Can't walk or can barely stand (new-onset)   Negative: Fever and red area (or area very tender to touch)   Negative: Patient sounds very sick or weak to the triager    Answer Assessment - Initial Assessment Questions  1. ONSET: \"When did the swelling start?\" (e.g., minutes, hours, days)      3 weeks ago  2. LOCATION: \"What part of the leg is swollen?\"  \"Are both legs swollen or just one leg?\"      Right leg  3. SEVERITY: \"How bad is the swelling?\" (e.g., localized; mild, moderate, severe)    - Localized: Small area of swelling localized to one leg.    - MILD pedal edema: Swelling limited to foot and ankle, pitting edema < 1/4 inch (6 mm) deep, rest and elevation eliminate most or all swelling.    - MODERATE edema: Swelling of lower leg to knee, pitting edema > 1/4 inch (6 mm) deep, rest and elevation only partially reduce swelling.    - SEVERE edema: Swelling extends above knee, facial or hand swelling present.       mild  4. REDNESS: \"Does the swelling look red or infected?\"      yes  5. PAIN: \"Is the swelling painful to touch?\" If Yes, ask: \"How painful is it?\"   (Scale 1-10; mild, moderate or severe)      Mild pain when touched  6. FEVER: \"Do you have a fever?\" If Yes, ask: \"What is it, how was it measured, and when did it start?\"       no  7. CAUSE: \"What do you think is causing the leg swelling?\"    " "  Don't know  8. MEDICAL HISTORY: \"Do you have a history of blood clots (e.g., DVT), cancer, heart failure, kidney disease, or liver failure?\"      Yes, blood clot history, most recent years ago  9. RECURRENT SYMPTOM: \"Have you had leg swelling before?\" If Yes, ask: \"When was the last time?\" \"What happened that time?\"      never  10. OTHER SYMPTOMS: \"Do you have any other symptoms?\" (e.g., chest pain, difficulty breathing)        SOB when moving around  11. PREGNANCY: \"Is there any chance you are pregnant?\" \"When was your last menstrual period?\"        no    Protocols used: Leg Swelling and Edema-A-[place]    "

## 2023-11-09 NOTE — TELEPHONE ENCOUNTER
Pt was seen in [facility] today. Pt hesitant to accept services provided by [facility], but agreeable to speaking with [facility] provider. However, pt left clinic before provider could speak with pt.     Call placed to pt inquiring where she went and if she was coming back to clinic. Pt stated she does not plan on returning to [facility] and would like to follow up with her hematologist about her symptoms instead.     Writer relayed importance of being seen for symptoms she is having, especially given critical labs. Pt insistent on seeing hematology instead.     MARZENA LACEY RN on 11/9/2023 at 12:07 PM

## 2023-11-09 NOTE — TELEPHONE ENCOUNTER
DATE/TIME OF CALL RECEIVED FROM LAB:  11/09/23 at 11:07 AM   Critical LAB TEST:  WBC 1.6 Hgb 7.9 Platelets 13 ANC 0.2   Has transfusion appointment on 11/11/23   PROVIDER NOTIFIED?: Yes  PROVIDER NAME: Dr Spann   DATE/TIME LAB VALUE REPORTED TO PROVIDER: 11:29 Dr Spann noted critical values   MECHANISM OF PROVIDER NOTIFICATION: Page at 11:16 to middle line   PROVIDER RESPONSE: Can keep transfusion appointment that is arranged.

## 2023-11-11 NOTE — PROGRESS NOTES
Infusion Nursing Note:  Bonny Raymundo presents today for 1 unit PRBCs, 1 unit Platelets.    Patient seen by provider today: No   present during visit today: Not Applicable.    Note: N/A.      Intravenous Access:  Peripheral IV placed.    Treatment Conditions:  Lab Results   Component Value Date    HGB 7.8 (L) 11/09/2023    WBC 1.7 (L) 11/09/2023    ANEU 0.2 (LL) 11/09/2023    ANEUTAUTO 0.2 (LL) 11/09/2023    PLT 3 (LL) 11/09/2023        Results reviewed, labs MET treatment parameters, ok to proceed with treatment.  Blood transfusion consent signed 8/7/23.      Post Infusion Assessment:  Patient tolerated infusion without incident.  Blood return noted pre and post infusion.  Site patent and intact, free from redness, edema or discomfort.  No evidence of extravasations.  Access discontinued per protocol.       Discharge Plan:   Patient declined prescription refills.  Discharge instructions reviewed with: Patient.  Patient verbalized understanding of discharge instructions and all questions answered.  AVS to patient via [employer].  Patient will return as scheduled for next appointment.   Patient discharged in stable condition accompanied by: self.  Departure Mode: Ambulatory.      Jennie Ricketts RN

## 2023-11-14 NOTE — PROGRESS NOTES
Infusion Nursing Note:  Bonny Raymundo presents today for blood and platelets transfusion.    Patient seen by provider today: No   present during visit today: Not Applicable.    Note: Pt declined premeds.      Intravenous Access:  Peripheral IV placed.    Treatment Conditions:  Lab Results   Component Value Date    HGB 7.9 (L) 11/13/2023    WBC 1.6 (L) 11/13/2023    ANEU 0.2 (LL) 11/13/2023    ANEUTAUTO 0.2 (LL) 11/09/2023    PLT 2 (LL) 11/13/2023        Results reviewed, labs MET treatment parameters, ok to proceed with treatment.  Blood transfusion consent signed 8/7/2023.      Post Infusion Assessment:  Patient tolerated infusion without incident.  Blood return noted pre and post infusion.  Site patent and intact, free from redness, edema or discomfort.  No evidence of extravasations.  Access discontinued per protocol.       Discharge Plan:   Discharge instructions reviewed with: Patient.  Patient and/or family verbalized understanding of discharge instructions and all questions answered.  AVS to patient via [employer].  Patient will return 11/18/23 for next appointment.   Patient discharged in stable condition accompanied by: self.  Departure Mode: Ambulatory.      Neri Chavarria RN

## 2023-11-15 NOTE — TELEPHONE ENCOUNTER
"Hearing Loss or Change:     DESCRIPTION: \"my hearings been bad for a while but it got a lot worse\" -   LOCATION: both ears worsening, R ear is worse   SEVERITY: had visitor today - couldn't hear with hearing aids   ONSET: a month ago   PATTERN: constant since onset   PAIN: denies   CAUSE: unsure   OTHER SYMPTOMS: denies dizziness, ringing in ears     Patient very frustrated while writer asking triage questions, patient states \"why are you asking me all of these questions\"    While triaging patient - patient states \"I have to take another call\" and ended call.    Will attempt to recall patient     On callback - please finish triaging hearing concern     Cr Thompson RN  [facility]    Additional Information   Negative: Followed an ear injury   Negative: Decreased hearing with nasal allergies   Negative: Part of a cold   Negative: Follows air travel or mountain driving   Negative: Earwax, questions about   Negative: Dizziness is main symptom   Negative: Tinnitus (e.g., ringing, hissing, beating) is main symptom   Negative: Patient sounds very sick or weak to the triager   Negative: Ringing in the ears (tinnitus) and taking aspirin and dosage sounds high (i.e., > 1500 mg/day)    Protocols used: Hearing Loss or Change-A-[place]    "

## 2023-11-15 NOTE — TELEPHONE ENCOUNTER
Reason for Call:  Appointment Request    Patient requesting this type of appt:  Sudden and complete hearing lost    Requested provider: Shelli Cash    Reason patient unable to be scheduled: Not within requested timeframe    When does patient want to be seen/preferred time: 1-2 weeks    Comments: Patient asked if Dr. Cash could get her in within the next two weeks. She stated ENT cannot get her in until December.     Could we send this information to you in [facility] or would you prefer to receive a phone call?:   Patient would prefer a phone call   Okay to leave a detailed message?: Yes at Cell number on file:    Telephone Information:   Mobile [phone]       Call taken on 11/15/2023 at 10:31 AM by Zainab Kunz

## 2023-11-15 NOTE — TELEPHONE ENCOUNTER
"FYI to provider  Nurse Triage SBAR    Is this a 2nd Level Triage? YES, LICENSED PRACTITIONER REVIEW IS REQUIRED    Situation: Patient has had an increase in hearing loss over the past month. It did not have a sudden onset rather it has been \"gradually diminishing over the month\". No pain or other Symptoms. Pt simply states \"she is old\". Patient scheduled for a visit tomorrow with Delores Pradhan at 9 am.     Background: has hearing aids     Assessment: wants to be seen    Protocol Recommended Disposition:   See in Office Today / See in office in three days     Recommendation: Needs to be seen     Routed to provider    Does the patient meet one of the following criteria for [facility] visit consideration? No    Reason for Disposition   Decreased hearing in 1 ear and gradual onset    Additional Information   Negative: Hearing loss in one or both ears of sudden onset and present now   Negative: Earache   Negative: Decreased hearing followed sudden, extremely loud noise (e.g., explosion, not just loud concert)   Negative: Decreased hearing and taking medication that can damage hearing (i.e., gentamycin, tobramycin, furosemide, ethacrynic acid, cisplatin, quinidine)   Negative: Patient wants to be seen    Answer Assessment - Initial Assessment Questions  1. DESCRIPTION: \"What type of hearing problem are you having? Describe it for me.\" (e.g., complete hearing loss, partial loss)      Severe, right ear, just about  2. LOCATION: \"One or both ears?\" If one, ask: \"Which ear?\"      Right ear  3. SEVERITY: \"Can you hear anything?\" If Yes, ask: \"What can you hear?\" (e.g., ticking watch, whisper, talking)    - MILD:  Difficulty hearing soft speech, quiet library sounds, or speech from a distance or over background noise.    - MODERATE: Difficulty hearing normal speech even at closed distances.    - SEVERE: Unable to hear most normal conversation and talking; only able to hear loud sounds such as an alarm clock.      Severe   4. ONSET: " "\"When did this begin?\" \"Did it start suddenly or come on gradually?\"      A month ago  5. PATTERN: \"Does this come and go, or has it been constant since it started?\"      Constant -diminishing  6. PAIN: \"Is there any pain in your ear(s)?\"  (Scale 1-10; or mild, moderate, severe)    - NONE (0): no pain    - MILD (1-3): doesn't interfere with normal activities     - MODERATE (4-7): interferes with normal activities or awakens from sleep     - SEVERE (8-10): excruciating pain, unable to do any normal activities       No  7. CAUSE: \"What do you think is causing this hearing problem?\"      old  8. OTHER SYMPTOMS: \"Do you have any other symptoms?\" (e.g., dizziness, ringing in ears)      no  9. PREGNANCY: \"Is there any chance you are pregnant?\" \"When was your last menstrual period?\"      no    Protocols used: Hearing Loss or Change-A-[place]    "

## 2023-11-16 NOTE — TELEPHONE ENCOUNTER
Bonny calls in she has no active bleeding going on.   She will wait to hear about upcoming transfusion time.

## 2023-11-16 NOTE — PROGRESS NOTES
"  Assessment & Plan     (H90.3) Sensorineural hearing loss of both ears  (primary encounter diagnosis)  Comment: 1 month history of gradual, continuous hearing loss, worse in right ear than left. Found to have perforated TM on left and questionable on right with notable serous otitis on right. I suspect these findings are chronic for her. Per chart review, pt has had bilateral myringotomies by Dr. Krueger within the past 6 years. Pt reports having appointment with Dr. Krueger on December 13th. She could see about getting seen sooner.   Plan: Follow up with ENT.     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    (H65.91) Right serous otitis media, unspecified chronicity  Comment: See above.        KHADRA Sanchez CNP  [facility] VITO Draper is a 77 year old, presenting for the following health issues:  Ear Problem      HPI       Pt reports one month history of gradual, continuous hearing loss. She has used her hearing aids for the past three years and they are in place today. Currently, the hearing loss is worse in the right ear compared to the left. She reports having hearing problems for years, and has had episodes of decreased hearing historically, but not to this severity. She reports she cannot hear the TV at all, but can use her phone if it is at a specific spot in front of her left year. She reports some sensitivity to light, but nothing outside of her norm. She also affirms occasional clicking in her right year.  She reports seeing Dr. Krueger ENT this year where they removed \"a big thing of wax\" from her ears. She states she has an appointment scheduled with him on December 13th for this episode of hearing loss, but wanted to be seen sooner, prompting today's visit.   She states she has been told in the past that she had a hole in her eardrum     She denies fullness, pressure, ringing, vision changes, dizziness, vertigo, or q-tip use. She " does report hitting her head last year during a fall where she broke her hip. She reports using ibuprofen 500 mg BID-TID.       Review of Systems   Detailed as above       Objective    /66   Pulse 78   Temp 98  F (36.7  C) (Tympanic)   Resp 16   Ht 1.524 m (5')   Wt 45.2 kg (99 lb 9.6 oz)   SpO2 94%   BMI 19.45 kg/m    There is no height or weight on file to calculate BMI.  Physical Exam  Constitutional:       Appearance: Normal appearance.   HENT:      Right Ear: Decreased hearing noted. A middle ear effusion is present. There is no impacted cerumen. Tympanic membrane is perforated (questionable).      Left Ear: Decreased hearing noted. No drainage. There is no impacted cerumen. Tympanic membrane is perforated.   Neurological:      Mental Status: She is alert.                I saw this patient in collaboration with Rogelio Cuellar      I was present with the APRN/PA student who participated in the service and in the documentation of the services provided. I have verified the history and personally performed the physical exam and medical decision making, as documented by the student and edited by me.     David Rey, APRN, CNP    Rogelio Cuellar NP Student

## 2023-11-16 NOTE — TELEPHONE ENCOUNTER
Per chart review, pt has OV scheduled with MARYLOU Hernandez today. This encounter will be closed.

## 2023-11-16 NOTE — PATIENT INSTRUCTIONS
[facility] Triage and after hours / weekends / holidays:  [phone]    Please call the triage or after hours line if you experience a temperature greater than or equal to 100.4, shaking chills, have uncontrolled nausea, vomiting and/or diarrhea, dizziness, shortness of breath, chest pain, bleeding, unexplained bruising, or if you have any other new/concerning symptoms, questions or concerns.      If you are having any concerning symptoms or wish to speak to a provider before your next infusion visit, please call your care coordinator or triage to notify them so we can adequately serve you.     If you need a refill on a narcotic prescription or other medication, please call before your infusion appointment.

## 2023-11-16 NOTE — PROGRESS NOTES
Infusion Nursing Note:  Bonny Raymundo presents today for 1 unit platelets.    Patient seen by provider today: No   present during visit today: Not Applicable.    Note: Bonny presents today feeling tired, but otherwise well. Denies pain or nausea/vomiting. Denies fevers/chills. Denies SOB, cough, chest pain, or dizziness/lightheadedness. Denies constipation/diarrhea. Denies urinary issues. Denies neuropathy. Offers no new concerns at this appointment. Bonny declines premedications prior to platelets today.      Intravenous Access:  Peripheral IV placed.    Treatment Conditions:     Latest Reference Range & Units 11/16/23 09:39   WBC 4.0 - 11.0 10e3/uL 1.7 (L)   Hemoglobin 11.7 - 15.7 g/dL 8.1 (L)   Hematocrit 35.0 - 47.0 % 25.8 (L)   Platelet Count 150 - 450 10e3/uL 2 (LL)   RBC Count 3.80 - 5.20 10e6/uL 2.71 (L)   MCV 78 - 100 fL 95   MCH 26.5 - 33.0 pg 29.9   MCHC 31.5 - 36.5 g/dL 31.4 (L)   RDW 10.0 - 15.0 % 15.8 (H)   % Neutrophils % 14   % Lymphocytes % 49   % Monocytes % 16   % Eosinophils % 0   % Basophils % 0   % Blasts % 21   Absolute Basophils 0.0 - 0.2 10e3/uL 0.0   Absolute Neutrophil 1.6 - 8.3 10e3/uL 0.2 (LL)   Absolute Lymphocytes 0.8 - 5.3 10e3/uL 0.8   Absolute Monocytes 0.0 - 1.3 10e3/uL 0.3   Absolute Eosinophils 0.0 - 0.7 10e3/uL 0.0   Absolute Blasts <=0.0 10e3/uL 0.4 (H)   Absolute NRBCs 10e3/uL 0.0   NRBCs per 100 WBC <1 /100 0   RBC Morphology  Confirmed RBC Indices   Platelet Morphology Automated Count Confirmed. Platelet morphology is normal.  Automated Count Confirmed. Platelet morphology is normal.   ABO/Rh(D)  A POS   Antibody Screen Negative  Negative   SPECIMEN EXPIRATION DATE  [id number]     Results reviewed, labs MET treatment parameters: Plts <10K.  Blood transfusion consent signed 08/02/23.      Post Infusion Assessment:  Patient tolerated infusion without incident.  Blood return noted pre and post infusion.  Site patent and intact, free from redness, edema or  discomfort.  No evidence of extravasations.  Access discontinued per protocol.       Discharge Plan:   Patient declined prescription refills.  Discharge instructions reviewed with: Patient.  Patient and/or family verbalized understanding of discharge instructions and all questions answered.  AVS to patient via [employer].  Patient will return to [facility] on 11/21 for next appointment.   Patient discharged in stable condition accompanied by: self.  Departure Mode: Ambulatory.      Tracy Busch RN

## 2023-11-16 NOTE — TELEPHONE ENCOUNTER
DATE/TIME OF CALL RECEIVED FROM LAB:  11/16/23 at 9:45 AM   LAB TEST:  Platelets less than 2 WBC 1.7 ANC 0.2   LAB VALUE:  Hgb 8.1   PROVIDER NOTIFIED?: Yes  PROVIDER NAME: Dr Spann through Sue Marquez   DATE/TIME LAB VALUE REPORTED TO PROVIDER: 9:55   MECHANISM OF PROVIDER NOTIFICATION:  epic secure chat   PROVIDER RESPONSE: Make sure no active bleeding.   Call placed to Bonny left message asking if she had any active bleeding going on?   Advised to call the triage line if active bleeding or if she has questions. Sue Marquez is working on getting her a transfusion appt tomorrow at [facility] and if that does not work out then getting something set up for Sunday.

## 2023-12-19 ENCOUNTER — PATIENT OUTREACH (OUTPATIENT)
Dept: ONCOLOGY | Facility: CLINIC | Age: 78
End: 2023-12-19
Payer: COMMERCIAL

## 2023-12-19 NOTE — TELEPHONE ENCOUNTER
"Date of death State file number  [id number]      Verified on [email or web address]    \"Notification of \" form completed and emailed to \"[email or web address]>  on 2023    "

## 2023-12-24 DIAGNOSIS — K21.9 GASTROESOPHAGEAL REFLUX DISEASE WITHOUT ESOPHAGITIS: ICD-10-CM

## 2023-12-26 RX ORDER — PANTOPRAZOLE SODIUM 20 MG/1
20 TABLET, DELAYED RELEASE ORAL DAILY
Qty: 90 TABLET | Refills: 1 | Status: SHIPPED | OUTPATIENT
Start: 2023-12-26

## 2023-12-26 NOTE — TELEPHONE ENCOUNTER
Pantoprazole 20mg EC tab  Last prescribing provider: Dr. Spann     Last clinic visit date: 10/12/23    Recommendations for requested medication (if none, N/A): NA    Any other pertinent information (if none, N/A): Dr. Spann is out of office today. Routing to Yeni Hartman.     Refilled: Y/N, if NO, why?

## 2024-02-07 NOTE — TELEPHONE ENCOUNTER
Blood bank calling stating they cannot run T&S because because orders appeared to have been cancelled.     Blood bank has blood sample that could be used for T&S.     1644 Sent secure chat to RNCC Sue Marquez RNCC    1645 Attempted to call [place] infusion nurses with no answer,    1659 After chart review, therapy plan does indicate need for T&S for tomorrow's transfusion appt plan.    Confirmed with blood bank that they can see order for T&S and blood samples they have they can run for tomorrow's transfusion plan.     
DATE/TIME OF CALL RECEIVED FROM LAB:  11/13/23 at 12:13 PM   Critical LAB TEST:  WBC 1.7 Platelets 2 ANC 0.2   Other LAB VALUE:  Hgb 8.2   PROVIDER NOTIFIED?: Yes  PROVIDER NAME: Dr Spann   DATE/TIME LAB VALUE REPORTED TO PROVIDER: 12:22   MECHANISM OF PROVIDER NOTIFICATION:  epic secure chat   PROVIDER RESPONSE: Patient OK to wait until tomorrow to get platelets and Transfusion of PRBC.   Call placed to Bonny to let her know that her platelets are 2 and Hgb 8.2. She is not bleeding. She will keep her appointment tomorrow for her transfusion.   She is asking to have her transfusion appt that is scheduled on the 18th be rescheduled for the 17th. At either [facility] or the   [facility] secure chat sent to Sue Marquez.    
Yes...

## 2024-04-25 NOTE — PLAN OF CARE
April 25, 2024     Patient: Myriam Thomas   YOB: 1974   Date of Visit: 4/25/2024       To Whom It May Concern:    It is my medical opinion that Myriam Thomas should remain off work until 4/29/24.         Sincerely,          Lizandro Barahona,     CC: No Recipients   Goal Outcome Evaluation:      Plan of Care Reviewed With: patient    Overall Patient Progress: no changeOverall Patient Progress: no change    Pt here with kidney stones, hedronephrosis. A&O x4. CMS WDL. Bowel sounds normoactive, passing flatus, last BM 8/6. VSS. Pt has been NPO since midnight. Up with SBA w/ walker or IV pole. Denies pain. Hgb 6.2 on admission, 2 units RBCs administered overnight. Urology consulted. Discharge pending.

## 2024-10-01 PROBLEM — K13.0 DISEASES OF LIPS: Status: ACTIVE | Noted: 2022-10-12

## (undated) DEVICE — ENDO SEAL BX PORT BPS-A

## (undated) DEVICE — CATH URETERAL OPEN END 6FR AXXCESS

## (undated) DEVICE — DRAPE GYN/UROLOGY FLUID POUCH TUR 29455

## (undated) DEVICE — RAD RX ISOVUE 300 (50ML) 61% IOPAMIDOL CHARGE PER ML

## (undated) DEVICE — PAD CHUX UNDERPAD 23X24" 7136

## (undated) DEVICE — SOL WATER IRRIG 3000ML BAG 2B7117

## (undated) DEVICE — GUIDEWIRE ZIPWIRE STIFF .035IN STRAIGHT TIP M0066802221

## (undated) DEVICE — TUBING SUCTION 12"X1/4" N612

## (undated) DEVICE — SOL WATER IRRIG 1000ML BOTTLE 2F7114

## (undated) DEVICE — GUIDEWIRE AMPLATZ SUPER STIFF .035 X 145CM M0066401080

## (undated) DEVICE — PACK CYSTOSCOPY SBA15CYFSI

## (undated) DEVICE — GLOVE BIOGEL PI MICRO SZ 7.5 48575

## (undated) RX ORDER — PROPOFOL 10 MG/ML
INJECTION, EMULSION INTRAVENOUS
Status: DISPENSED
Start: 2023-01-01

## (undated) RX ORDER — GLYCOPYRROLATE 0.2 MG/ML
INJECTION, SOLUTION INTRAMUSCULAR; INTRAVENOUS
Status: DISPENSED
Start: 2023-01-01

## (undated) RX ORDER — ONDANSETRON 2 MG/ML
INJECTION INTRAMUSCULAR; INTRAVENOUS
Status: DISPENSED
Start: 2023-01-01

## (undated) RX ORDER — FENTANYL CITRATE 50 UG/ML
INJECTION, SOLUTION INTRAMUSCULAR; INTRAVENOUS
Status: DISPENSED
Start: 2018-05-24

## (undated) RX ORDER — FENTANYL CITRATE 50 UG/ML
INJECTION, SOLUTION INTRAMUSCULAR; INTRAVENOUS
Status: DISPENSED
Start: 2023-01-01

## (undated) RX ORDER — DEXAMETHASONE SODIUM PHOSPHATE 4 MG/ML
INJECTION, SOLUTION INTRA-ARTICULAR; INTRALESIONAL; INTRAMUSCULAR; INTRAVENOUS; SOFT TISSUE
Status: DISPENSED
Start: 2023-01-01